# Patient Record
Sex: MALE | Race: WHITE | NOT HISPANIC OR LATINO | Employment: FULL TIME | URBAN - METROPOLITAN AREA
[De-identification: names, ages, dates, MRNs, and addresses within clinical notes are randomized per-mention and may not be internally consistent; named-entity substitution may affect disease eponyms.]

---

## 2018-09-24 ENCOUNTER — OFFICE VISIT (OUTPATIENT)
Dept: FAMILY MEDICINE CLINIC | Facility: CLINIC | Age: 53
End: 2018-09-24
Payer: COMMERCIAL

## 2018-09-24 VITALS
OXYGEN SATURATION: 96 % | DIASTOLIC BLOOD PRESSURE: 68 MMHG | HEART RATE: 79 BPM | BODY MASS INDEX: 30.96 KG/M2 | TEMPERATURE: 98.6 F | HEIGHT: 75 IN | SYSTOLIC BLOOD PRESSURE: 110 MMHG | WEIGHT: 249 LBS

## 2018-09-24 DIAGNOSIS — M79.661 RIGHT CALF PAIN: ICD-10-CM

## 2018-09-24 DIAGNOSIS — I10 HYPERTENSION, ESSENTIAL: ICD-10-CM

## 2018-09-24 DIAGNOSIS — K21.9 GASTROESOPHAGEAL REFLUX DISEASE WITHOUT ESOPHAGITIS: ICD-10-CM

## 2018-09-24 DIAGNOSIS — E11.42 TYPE 2 DIABETES MELLITUS WITH DIABETIC POLYNEUROPATHY, WITH LONG-TERM CURRENT USE OF INSULIN (HCC): Primary | ICD-10-CM

## 2018-09-24 DIAGNOSIS — Z79.4 TYPE 2 DIABETES MELLITUS WITH DIABETIC POLYNEUROPATHY, WITH LONG-TERM CURRENT USE OF INSULIN (HCC): Primary | ICD-10-CM

## 2018-09-24 DIAGNOSIS — G62.9 NEUROPATHY: ICD-10-CM

## 2018-09-24 PROBLEM — E11.9 TYPE 2 DIABETES MELLITUS WITHOUT COMPLICATION, WITHOUT LONG-TERM CURRENT USE OF INSULIN (HCC): Status: ACTIVE | Noted: 2018-09-24

## 2018-09-24 PROCEDURE — 3008F BODY MASS INDEX DOCD: CPT | Performed by: PHYSICIAN ASSISTANT

## 2018-09-24 PROCEDURE — 99204 OFFICE O/P NEW MOD 45 MIN: CPT | Performed by: PHYSICIAN ASSISTANT

## 2018-09-24 PROCEDURE — 3074F SYST BP LT 130 MM HG: CPT | Performed by: PHYSICIAN ASSISTANT

## 2018-09-24 PROCEDURE — 3078F DIAST BP <80 MM HG: CPT | Performed by: PHYSICIAN ASSISTANT

## 2018-09-24 RX ORDER — OMEPRAZOLE 40 MG/1
CAPSULE, DELAYED RELEASE ORAL
COMMUNITY
Start: 2018-07-10 | End: 2018-11-16

## 2018-09-24 RX ORDER — IRBESARTAN/HYDROCHLOROTHIAZIDE 300-12.5MG
TABLET ORAL
COMMUNITY
Start: 2018-09-04 | End: 2019-08-19 | Stop reason: SDUPTHER

## 2018-09-24 RX ORDER — VERAPAMIL HYDROCHLORIDE 360 MG/1
360 CAPSULE, DELAYED RELEASE PELLETS ORAL DAILY
COMMUNITY
Start: 2018-09-17 | End: 2020-10-19

## 2018-09-24 RX ORDER — CELECOXIB 200 MG/1
200 CAPSULE ORAL 2 TIMES DAILY
Qty: 60 CAPSULE | Refills: 1 | Status: SHIPPED | OUTPATIENT
Start: 2018-09-24 | End: 2018-11-16

## 2018-09-24 RX ORDER — PEN NEEDLE, DIABETIC 32GX 5/32"
NEEDLE, DISPOSABLE MISCELLANEOUS
COMMUNITY
Start: 2018-09-20 | End: 2019-01-24 | Stop reason: SDUPTHER

## 2018-09-25 ENCOUNTER — TELEPHONE (OUTPATIENT)
Dept: FAMILY MEDICINE CLINIC | Facility: CLINIC | Age: 53
End: 2018-09-25

## 2018-09-25 ENCOUNTER — HOSPITAL ENCOUNTER (OUTPATIENT)
Dept: ULTRASOUND IMAGING | Facility: CLINIC | Age: 53
Discharge: HOME | End: 2018-09-25
Payer: COMMERCIAL

## 2018-09-25 DIAGNOSIS — M79.661 RIGHT CALF PAIN: ICD-10-CM

## 2018-09-25 PROCEDURE — 93971 EXTREMITY STUDY: CPT

## 2018-09-25 NOTE — TELEPHONE ENCOUNTER
Pt's VAS lower limb venous duplex study, unilateral/limited was Negative for DVT  They will let pt go home

## 2018-09-27 PROCEDURE — 93971 EXTREMITY STUDY: CPT | Performed by: SURGERY

## 2018-10-29 ENCOUNTER — OFFICE VISIT (OUTPATIENT)
Dept: FAMILY MEDICINE CLINIC | Facility: CLINIC | Age: 53
End: 2018-10-29
Payer: COMMERCIAL

## 2018-10-29 VITALS
BODY MASS INDEX: 31.71 KG/M2 | OXYGEN SATURATION: 97 % | SYSTOLIC BLOOD PRESSURE: 130 MMHG | TEMPERATURE: 97.8 F | HEIGHT: 75 IN | WEIGHT: 255 LBS | DIASTOLIC BLOOD PRESSURE: 92 MMHG | HEART RATE: 55 BPM

## 2018-10-29 DIAGNOSIS — L03.031 PARONYCHIA OF GREAT TOE, RIGHT: Primary | ICD-10-CM

## 2018-10-29 DIAGNOSIS — R80.9 DIABETES MELLITUS WITH PROTEINURIA (HCC): ICD-10-CM

## 2018-10-29 DIAGNOSIS — G62.9 NEUROPATHY: ICD-10-CM

## 2018-10-29 DIAGNOSIS — H66.003 ACUTE SUPPURATIVE OTITIS MEDIA OF BOTH EARS WITHOUT SPONTANEOUS RUPTURE OF TYMPANIC MEMBRANES, RECURRENCE NOT SPECIFIED: ICD-10-CM

## 2018-10-29 DIAGNOSIS — E11.29 DIABETES MELLITUS WITH PROTEINURIA (HCC): ICD-10-CM

## 2018-10-29 PROCEDURE — 99212 OFFICE O/P EST SF 10 MIN: CPT | Performed by: PHYSICIAN ASSISTANT

## 2018-10-29 RX ORDER — ATORVASTATIN CALCIUM 20 MG/1
TABLET, FILM COATED ORAL
COMMUNITY
Start: 2018-06-19 | End: 2018-11-16

## 2018-10-29 RX ORDER — DICLOFENAC SODIUM 75 MG/1
75 TABLET, DELAYED RELEASE ORAL 2 TIMES DAILY WITH MEALS
Refills: 0 | COMMUNITY
Start: 2018-08-06 | End: 2018-11-16

## 2018-10-29 RX ORDER — CITALOPRAM 20 MG/1
TABLET ORAL
COMMUNITY
Start: 2018-01-24 | End: 2018-11-16

## 2018-10-29 RX ORDER — SILDENAFIL CITRATE 20 MG/1
TABLET ORAL
COMMUNITY
Start: 2018-01-24 | End: 2018-11-16

## 2018-10-29 RX ORDER — CHLORDIAZEPOXIDE HYDROCHLORIDE 10 MG/1
CAPSULE, GELATIN COATED ORAL
COMMUNITY
Start: 2018-01-24 | End: 2018-11-16

## 2018-10-29 RX ORDER — SULFAMETHOXAZOLE AND TRIMETHOPRIM 800; 160 MG/1; MG/1
1 TABLET ORAL EVERY 12 HOURS SCHEDULED
Qty: 20 TABLET | Refills: 0 | Status: SHIPPED | OUTPATIENT
Start: 2018-10-29 | End: 2018-11-06 | Stop reason: SDDI

## 2018-10-29 RX ORDER — DOXEPIN HYDROCHLORIDE 100 MG/1
CAPSULE ORAL
COMMUNITY
Start: 2018-01-24 | End: 2018-11-16

## 2018-10-29 NOTE — PROGRESS NOTES
Assessment/Plan:       Diagnoses and all orders for this visit:    Paronychia of great toe, right  -     sulfamethoxazole-trimethoprim (BACTRIM DS) 800-160 mg per tablet; Take 1 tablet by mouth every 12 (twelve) hours for 10 days    Neuropathy    Diabetes mellitus with proteinuria (HCC)    Acute suppurative otitis media of both ears without spontaneous rupture of tympanic membranes, recurrence not specified  -     sulfamethoxazole-trimethoprim (BACTRIM DS) 800-160 mg per tablet; Take 1 tablet by mouth every 12 (twelve) hours for 10 days    Other orders  -     atorvastatin (LIPITOR) 20 mg tablet; Take by mouth  -     diclofenac (VOLTAREN) 75 mg EC tablet; Take 75 mg by mouth 2 (two) times a day with meals  -     citalopram (CeleXA) 20 mg tablet; Take by mouth  -     chlordiazePOXIDE (LIBRIUM) 10 mg capsule; Take by mouth  -     doxepin (SINEquan) 100 mg capsule; Take by mouth  -     LYRICA 50 MG capsule;   -     sildenafil (REVATIO) 20 mg tablet; Take by mouth            Subjective:      Patient ID: Rupa Ortez is a 52 y o  male  Patient is here for follow-up of his right lower leg  He also notes that he has a sore on his right great toe  He has had some relief since starting Celebrex  He states that the neuropathy type pains that he was having are basically gone  He still has some slight discomfort but it is not what it used to be  He noticed last week that he had a sore on his right great toe  He has had diabetic and he became worried and made an appointment  He states that he is been cleaning and dressing at until his appointment  He has not gotten is blood work done yet he states he will try to get it done this week or next week  Leg Pain    The incident occurred more than 1 week ago  There was no injury mechanism  The pain is present in the right leg  The pain is at a severity of 4/10  The pain has been improving since onset  Associated symptoms include tingling   Pertinent negatives include no inability to bear weight, loss of motion, loss of sensation, muscle weakness or numbness  He reports no foreign bodies present  The symptoms are aggravated by weight bearing and palpation  He has tried NSAIDs for the symptoms  The treatment provided moderate relief  The following portions of the patient's history were reviewed and updated as appropriate:   He has no past medical history on file  ,   does not have any pertinent problems on file  ,   has a past surgical history that includes Foot surgery (Right)  ,  family history includes Cancer in his mother; Hypertension in his father  ,   reports that he has never smoked  He has never used smokeless tobacco  He reports that he drinks about 4 8 oz of alcohol per week   He reports that he does not use drugs  ,  has No Known Allergies     Current Outpatient Prescriptions   Medication Sig Dispense Refill    atorvastatin (LIPITOR) 20 mg tablet Take by mouth      AVALIDE 300-12 5 MG per tablet       BD PEN NEEDLE DEV U/F 32G X 4 MM MISC       Blood Glucose Monitoring Suppl SHAJI by Does not apply route      celecoxib (CeleBREX) 200 mg capsule Take 1 capsule (200 mg total) by mouth 2 (two) times a day 60 capsule 1    glucose blood test strip 1 each by Other route as needed Use as instructed      insulin glargine (LANTUS SOLOSTAR) 100 units/mL injection pen Inject 70 Units under the skin daily at bedtime 5 pen 0    insulin glulisine (APIDRA SOLOSTAR) 100 units/mL injection pen Inject 26 Units under the skin 3 (three) times a day with meals 5 pen 0    omeprazole (PriLOSEC) 40 MG capsule       verapamil (VERELAN PM) 360 MG 24 hr capsule       chlordiazePOXIDE (LIBRIUM) 10 mg capsule Take by mouth      citalopram (CeleXA) 20 mg tablet Take by mouth      diclofenac (VOLTAREN) 75 mg EC tablet Take 75 mg by mouth 2 (two) times a day with meals  0    doxepin (SINEquan) 100 mg capsule Take by mouth      LYRICA 50 MG capsule   0    sildenafil (REVATIO) 20 mg tablet Take by mouth      sulfamethoxazole-trimethoprim (BACTRIM DS) 800-160 mg per tablet Take 1 tablet by mouth every 12 (twelve) hours for 10 days 20 tablet 0     No current facility-administered medications for this visit  Review of Systems   Constitutional: Negative for activity change, chills and fever  Musculoskeletal: Positive for myalgias  Skin: Positive for wound  Neurological: Positive for tingling  Negative for dizziness and numbness  Objective:  Vitals:    10/29/18 1553   BP: 130/92   Pulse: 55   Temp: 97 8 °F (36 6 °C)   SpO2: 97%   Weight: 116 kg (255 lb)   Height: 6' 3" (1 905 m)     Body mass index is 31 87 kg/m²  Physical Exam   Constitutional: He is oriented to person, place, and time  He appears well-developed and well-nourished  Musculoskeletal: He exhibits tenderness  He exhibits no edema  Neurological: He is alert and oriented to person, place, and time  Skin:        Psychiatric: He has a normal mood and affect   His behavior is normal

## 2018-10-31 ENCOUNTER — TELEPHONE (OUTPATIENT)
Dept: FAMILY MEDICINE CLINIC | Facility: CLINIC | Age: 53
End: 2018-10-31

## 2018-11-01 ENCOUNTER — TELEPHONE (OUTPATIENT)
Dept: FAMILY MEDICINE CLINIC | Facility: CLINIC | Age: 53
End: 2018-11-01

## 2018-11-01 DIAGNOSIS — E11.29 DIABETES MELLITUS WITH PROTEINURIA (HCC): Primary | ICD-10-CM

## 2018-11-01 DIAGNOSIS — R80.9 DIABETES MELLITUS WITH PROTEINURIA (HCC): Primary | ICD-10-CM

## 2018-11-01 RX ORDER — BLOOD-GLUCOSE METER
EACH MISCELLANEOUS 2 TIMES DAILY
Qty: 1 EACH | Refills: 0 | Status: SHIPPED | OUTPATIENT
Start: 2018-11-01 | End: 2018-11-02 | Stop reason: SDUPTHER

## 2018-11-01 NOTE — TELEPHONE ENCOUNTER
Pt has missed several days of work d/t stomach bug  He would like to return to work     Adenike Haq for note?  [phone]

## 2018-11-02 DIAGNOSIS — E11.29 DIABETES MELLITUS WITH PROTEINURIA (HCC): ICD-10-CM

## 2018-11-02 DIAGNOSIS — R80.9 DIABETES MELLITUS WITH PROTEINURIA (HCC): ICD-10-CM

## 2018-11-02 RX ORDER — BLOOD-GLUCOSE METER
EACH MISCELLANEOUS 2 TIMES DAILY
Qty: 1 EACH | Refills: 0 | Status: SHIPPED | OUTPATIENT
Start: 2018-11-02

## 2018-11-02 NOTE — TELEPHONE ENCOUNTER
Pt's glucose meter was sent to mail order  Can we please send it to [facility]? He said he hasn't check his BS in a few weeks

## 2018-11-06 ENCOUNTER — OFFICE VISIT (OUTPATIENT)
Dept: FAMILY MEDICINE CLINIC | Facility: CLINIC | Age: 53
End: 2018-11-06
Payer: COMMERCIAL

## 2018-11-06 ENCOUNTER — APPOINTMENT (OUTPATIENT)
Dept: LAB | Facility: HOSPITAL | Age: 53
End: 2018-11-06
Payer: COMMERCIAL

## 2018-11-06 VITALS
TEMPERATURE: 98.3 F | RESPIRATION RATE: 16 BRPM | OXYGEN SATURATION: 97 % | HEIGHT: 75 IN | HEART RATE: 109 BPM | SYSTOLIC BLOOD PRESSURE: 108 MMHG | BODY MASS INDEX: 30.59 KG/M2 | DIASTOLIC BLOOD PRESSURE: 70 MMHG | WEIGHT: 246 LBS

## 2018-11-06 DIAGNOSIS — I10 HYPERTENSION, ESSENTIAL: ICD-10-CM

## 2018-11-06 DIAGNOSIS — Z79.4 TYPE 2 DIABETES MELLITUS WITH DIABETIC POLYNEUROPATHY, WITH LONG-TERM CURRENT USE OF INSULIN (HCC): ICD-10-CM

## 2018-11-06 DIAGNOSIS — E11.42 TYPE 2 DIABETES MELLITUS WITH DIABETIC POLYNEUROPATHY, WITH LONG-TERM CURRENT USE OF INSULIN (HCC): ICD-10-CM

## 2018-11-06 LAB
ALBUMIN SERPL BCP-MCNC: 3.8 G/DL (ref 3.5–5)
ALP SERPL-CCNC: 101 U/L (ref 46–116)
ALT SERPL W P-5'-P-CCNC: 104 U/L (ref 12–78)
ANION GAP SERPL CALCULATED.3IONS-SCNC: 10 MMOL/L (ref 4–13)
AST SERPL W P-5'-P-CCNC: 50 U/L (ref 5–45)
BILIRUB SERPL-MCNC: 0.5 MG/DL (ref 0.2–1)
BUN SERPL-MCNC: 27 MG/DL (ref 5–25)
CALCIUM SERPL-MCNC: 10 MG/DL (ref 8.3–10.1)
CHLORIDE SERPL-SCNC: 97 MMOL/L (ref 100–108)
CO2 SERPL-SCNC: 28 MMOL/L (ref 21–32)
CREAT SERPL-MCNC: 1.13 MG/DL (ref 0.6–1.3)
EST. AVERAGE GLUCOSE BLD GHB EST-MCNC: 280 MG/DL
GFR SERPL CREATININE-BSD FRML MDRD: 73 ML/MIN/1.73SQ M
GLUCOSE P FAST SERPL-MCNC: 369 MG/DL (ref 65–99)
HBA1C MFR BLD: 11.4 % (ref 4.2–6.3)
POTASSIUM SERPL-SCNC: 4.9 MMOL/L (ref 3.5–5.3)
PROT SERPL-MCNC: 8.1 G/DL (ref 6.4–8.2)
SODIUM SERPL-SCNC: 135 MMOL/L (ref 136–145)

## 2018-11-06 PROCEDURE — 99212 OFFICE O/P EST SF 10 MIN: CPT | Performed by: PHYSICIAN ASSISTANT

## 2018-11-06 PROCEDURE — 83036 HEMOGLOBIN GLYCOSYLATED A1C: CPT

## 2018-11-06 PROCEDURE — 36415 COLL VENOUS BLD VENIPUNCTURE: CPT

## 2018-11-06 PROCEDURE — 80053 COMPREHEN METABOLIC PANEL: CPT

## 2018-11-07 ENCOUNTER — TELEPHONE (OUTPATIENT)
Dept: FAMILY MEDICINE CLINIC | Facility: CLINIC | Age: 53
End: 2018-11-07

## 2018-11-07 NOTE — TELEPHONE ENCOUNTER
T/c to patient to clarify, he again said April 30th, should have been a Tuesday  I asked about the October date and he said "did I say April? I meant October"!   So yes, October 30, 2018

## 2018-11-07 NOTE — TELEPHONE ENCOUNTER
Pt was seen yesterday  Called today to inform that you will be receiving disability papers and his first day out was 4/30/18

## 2018-11-15 ENCOUNTER — HOSPITAL ENCOUNTER (EMERGENCY)
Facility: HOSPITAL | Age: 53
Discharge: HOME | End: 2018-11-15
Attending: EMERGENCY MEDICINE | Admitting: EMERGENCY MEDICINE
Payer: COMMERCIAL

## 2018-11-15 ENCOUNTER — OFFICE VISIT (OUTPATIENT)
Dept: FAMILY MEDICINE CLINIC | Facility: CLINIC | Age: 53
End: 2018-11-15

## 2018-11-15 ENCOUNTER — APPOINTMENT (EMERGENCY)
Dept: CT IMAGING | Facility: HOSPITAL | Age: 53
End: 2018-11-15
Payer: COMMERCIAL

## 2018-11-15 ENCOUNTER — APPOINTMENT (EMERGENCY)
Dept: RADIOLOGY | Facility: HOSPITAL | Age: 53
End: 2018-11-15
Payer: COMMERCIAL

## 2018-11-15 VITALS
RESPIRATION RATE: 16 BRPM | HEIGHT: 75 IN | TEMPERATURE: 97.8 F | HEART RATE: 76 BPM | WEIGHT: 248.68 LBS | SYSTOLIC BLOOD PRESSURE: 149 MMHG | DIASTOLIC BLOOD PRESSURE: 91 MMHG | BODY MASS INDEX: 30.92 KG/M2 | OXYGEN SATURATION: 98 %

## 2018-11-15 VITALS
OXYGEN SATURATION: 97 % | BODY MASS INDEX: 30.71 KG/M2 | DIASTOLIC BLOOD PRESSURE: 100 MMHG | HEART RATE: 92 BPM | WEIGHT: 247 LBS | SYSTOLIC BLOOD PRESSURE: 150 MMHG | HEIGHT: 75 IN | TEMPERATURE: 97.7 F

## 2018-11-15 DIAGNOSIS — E11.65 HYPERGLYCEMIA DUE TO TYPE 2 DIABETES MELLITUS (HCC): Primary | ICD-10-CM

## 2018-11-15 DIAGNOSIS — R11.2 NAUSEA AND VOMITING: ICD-10-CM

## 2018-11-15 DIAGNOSIS — E11.9 TYPE 2 DIABETES MELLITUS WITH HEMOGLOBIN A1C GOAL OF LESS THAN 7.0% (HCC): Primary | ICD-10-CM

## 2018-11-15 DIAGNOSIS — R42 VERTIGO: ICD-10-CM

## 2018-11-15 DIAGNOSIS — R42 DIZZINESS: ICD-10-CM

## 2018-11-15 LAB
ACETONE SERPL-MCNC: NEGATIVE MG/DL
ALBUMIN SERPL BCP-MCNC: 4.1 G/DL (ref 3.5–5)
ALP SERPL-CCNC: 99 U/L (ref 46–116)
ALT SERPL W P-5'-P-CCNC: 82 U/L (ref 12–78)
ANION GAP SERPL CALCULATED.3IONS-SCNC: 10 MMOL/L (ref 4–13)
APTT PPP: 27 SECONDS (ref 26–38)
AST SERPL W P-5'-P-CCNC: 48 U/L (ref 5–45)
BACTERIA UR QL AUTO: ABNORMAL /HPF
BASE EX.OXY STD BLDV CALC-SCNC: 39.7 % (ref 60–80)
BASE EXCESS BLDV CALC-SCNC: 4.4 MMOL/L
BASOPHILS # BLD AUTO: 0.06 THOUSANDS/ΜL (ref 0–0.1)
BASOPHILS NFR BLD AUTO: 1 % (ref 0–1)
BILIRUB DIRECT SERPL-MCNC: 0.18 MG/DL (ref 0–0.2)
BILIRUB SERPL-MCNC: 0.8 MG/DL (ref 0.2–1)
BILIRUB UR QL STRIP: NEGATIVE
BUN SERPL-MCNC: 12 MG/DL (ref 5–25)
CALCIUM SERPL-MCNC: 9.7 MG/DL (ref 8.3–10.1)
CHLORIDE SERPL-SCNC: 96 MMOL/L (ref 100–108)
CLARITY UR: CLEAR
CO2 SERPL-SCNC: 30 MMOL/L (ref 21–32)
COLOR UR: YELLOW
CREAT SERPL-MCNC: 0.78 MG/DL (ref 0.6–1.3)
EOSINOPHIL # BLD AUTO: 0.23 THOUSAND/ΜL (ref 0–0.61)
EOSINOPHIL NFR BLD AUTO: 3 % (ref 0–6)
ERYTHROCYTE [DISTWIDTH] IN BLOOD BY AUTOMATED COUNT: 11.8 % (ref 11.6–15.1)
GFR SERPL CREATININE-BSD FRML MDRD: 102 ML/MIN/1.73SQ M
GLUCOSE SERPL-MCNC: 201 MG/DL (ref 65–140)
GLUCOSE SERPL-MCNC: 225 MG/DL (ref 65–140)
GLUCOSE SERPL-MCNC: 226 MG/DL (ref 65–140)
GLUCOSE UR STRIP-MCNC: ABNORMAL MG/DL
HCO3 BLDV-SCNC: 30.3 MMOL/L (ref 24–30)
HCT VFR BLD AUTO: 47.1 % (ref 36.5–49.3)
HGB BLD-MCNC: 16.6 G/DL (ref 12–17)
HGB UR QL STRIP.AUTO: ABNORMAL
IMM GRANULOCYTES # BLD AUTO: 0.03 THOUSAND/UL (ref 0–0.2)
IMM GRANULOCYTES NFR BLD AUTO: 0 % (ref 0–2)
INR PPP: 0.99 (ref 0.86–1.17)
KETONES UR STRIP-MCNC: NEGATIVE MG/DL
LEUKOCYTE ESTERASE UR QL STRIP: NEGATIVE
LIPASE SERPL-CCNC: 103 U/L (ref 73–393)
LYMPHOCYTES # BLD AUTO: 2.81 THOUSANDS/ΜL (ref 0.6–4.47)
LYMPHOCYTES NFR BLD AUTO: 30 % (ref 14–44)
MAGNESIUM SERPL-MCNC: 1.9 MG/DL (ref 1.6–2.6)
MCH RBC QN AUTO: 31.2 PG (ref 26.8–34.3)
MCHC RBC AUTO-ENTMCNC: 35.2 G/DL (ref 31.4–37.4)
MCV RBC AUTO: 89 FL (ref 82–98)
MONOCYTES # BLD AUTO: 0.93 THOUSAND/ΜL (ref 0.17–1.22)
MONOCYTES NFR BLD AUTO: 10 % (ref 4–12)
NEUTROPHILS # BLD AUTO: 5.17 THOUSANDS/ΜL (ref 1.85–7.62)
NEUTS SEG NFR BLD AUTO: 56 % (ref 43–75)
NITRITE UR QL STRIP: NEGATIVE
NON-SQ EPI CELLS URNS QL MICRO: ABNORMAL /HPF
NRBC BLD AUTO-RTO: 0 /100 WBCS
O2 CT BLDV-SCNC: 9.5 ML/DL
PCO2 BLDV: 49.1 MM HG (ref 42–50)
PH BLDV: 7.41 [PH] (ref 7.3–7.4)
PH UR STRIP.AUTO: 6 [PH] (ref 4.5–8)
PLATELET # BLD AUTO: 261 THOUSANDS/UL (ref 149–390)
PMV BLD AUTO: 11.2 FL (ref 8.9–12.7)
PO2 BLDV: 22.3 MM HG (ref 35–45)
POTASSIUM SERPL-SCNC: 3.6 MMOL/L (ref 3.5–5.3)
PROT SERPL-MCNC: 8.4 G/DL (ref 6.4–8.2)
PROT UR STRIP-MCNC: NEGATIVE MG/DL
PROTHROMBIN TIME: 13 SECONDS (ref 11.8–14.2)
RBC # BLD AUTO: 5.32 MILLION/UL (ref 3.88–5.62)
RBC #/AREA URNS AUTO: ABNORMAL /HPF
SODIUM SERPL-SCNC: 136 MMOL/L (ref 136–145)
SP GR UR STRIP.AUTO: <=1.005 (ref 1–1.03)
TROPONIN I SERPL-MCNC: <0.02 NG/ML
UROBILINOGEN UR QL STRIP.AUTO: 0.2 E.U./DL
WBC # BLD AUTO: 9.23 THOUSAND/UL (ref 4.31–10.16)
WBC #/AREA URNS AUTO: ABNORMAL /HPF

## 2018-11-15 PROCEDURE — 96374 THER/PROPH/DIAG INJ IV PUSH: CPT

## 2018-11-15 PROCEDURE — 83690 ASSAY OF LIPASE: CPT | Performed by: EMERGENCY MEDICINE

## 2018-11-15 PROCEDURE — 84484 ASSAY OF TROPONIN QUANT: CPT | Performed by: EMERGENCY MEDICINE

## 2018-11-15 PROCEDURE — 82009 KETONE BODYS QUAL: CPT | Performed by: EMERGENCY MEDICINE

## 2018-11-15 PROCEDURE — 85730 THROMBOPLASTIN TIME PARTIAL: CPT | Performed by: EMERGENCY MEDICINE

## 2018-11-15 PROCEDURE — 70496 CT ANGIOGRAPHY HEAD: CPT

## 2018-11-15 PROCEDURE — 99284 EMERGENCY DEPT VISIT MOD MDM: CPT

## 2018-11-15 PROCEDURE — 96361 HYDRATE IV INFUSION ADD-ON: CPT

## 2018-11-15 PROCEDURE — 36415 COLL VENOUS BLD VENIPUNCTURE: CPT | Performed by: EMERGENCY MEDICINE

## 2018-11-15 PROCEDURE — 71046 X-RAY EXAM CHEST 2 VIEWS: CPT

## 2018-11-15 PROCEDURE — 83735 ASSAY OF MAGNESIUM: CPT | Performed by: EMERGENCY MEDICINE

## 2018-11-15 PROCEDURE — 70498 CT ANGIOGRAPHY NECK: CPT

## 2018-11-15 PROCEDURE — 80076 HEPATIC FUNCTION PANEL: CPT | Performed by: EMERGENCY MEDICINE

## 2018-11-15 PROCEDURE — RECHECK: Performed by: PHYSICIAN ASSISTANT

## 2018-11-15 PROCEDURE — 82948 REAGENT STRIP/BLOOD GLUCOSE: CPT

## 2018-11-15 PROCEDURE — 85610 PROTHROMBIN TIME: CPT | Performed by: EMERGENCY MEDICINE

## 2018-11-15 PROCEDURE — 81001 URINALYSIS AUTO W/SCOPE: CPT | Performed by: EMERGENCY MEDICINE

## 2018-11-15 PROCEDURE — 82805 BLOOD GASES W/O2 SATURATION: CPT | Performed by: EMERGENCY MEDICINE

## 2018-11-15 PROCEDURE — 93005 ELECTROCARDIOGRAM TRACING: CPT

## 2018-11-15 PROCEDURE — 85025 COMPLETE CBC W/AUTO DIFF WBC: CPT | Performed by: EMERGENCY MEDICINE

## 2018-11-15 PROCEDURE — 80048 BASIC METABOLIC PNL TOTAL CA: CPT | Performed by: EMERGENCY MEDICINE

## 2018-11-15 RX ORDER — MECLIZINE HYDROCHLORIDE 25 MG/1
50 TABLET ORAL ONCE
Status: DISCONTINUED | OUTPATIENT
Start: 2018-11-15 | End: 2018-11-15 | Stop reason: HOSPADM

## 2018-11-15 RX ORDER — ONDANSETRON 2 MG/ML
4 INJECTION INTRAMUSCULAR; INTRAVENOUS ONCE
Status: COMPLETED | OUTPATIENT
Start: 2018-11-15 | End: 2018-11-15

## 2018-11-15 RX ORDER — MECLIZINE HYDROCHLORIDE 25 MG/1
25 TABLET ORAL EVERY 8 HOURS PRN
Qty: 20 TABLET | Refills: 0 | Status: SHIPPED | OUTPATIENT
Start: 2018-11-15 | End: 2018-11-16

## 2018-11-15 RX ADMIN — SODIUM CHLORIDE 1000 ML: 0.9 INJECTION, SOLUTION INTRAVENOUS at 09:48

## 2018-11-15 RX ADMIN — ONDANSETRON HYDROCHLORIDE 4 MG: 2 SOLUTION INTRAMUSCULAR; INTRAVENOUS at 09:47

## 2018-11-15 RX ADMIN — IOHEXOL 85 ML: 350 INJECTION, SOLUTION INTRAVENOUS at 10:36

## 2018-11-15 NOTE — DISCHARGE INSTRUCTIONS
Vertigo   WHAT YOU NEED TO KNOW:   Vertigo is a condition that causes you to feel dizzy  You may feel that you or everything around you is moving or spinning  You may also feel like you are being pulled down or toward your side  DISCHARGE INSTRUCTIONS:   Seek care immediately if:   · You have a headache and a stiff neck  · You have shaking chills and a fever  · You vomit over and over with no relief  · You have blood, pus, or fluid coming out of your ears  · You are confused  Contact your healthcare provider if:   · Your symptoms do not get better with treatment  · You have questions about your condition or care  Medicines:   · Medicine  may be given to help relieve your symptoms  · Take your medicine as directed  Contact your healthcare provider if you think your medicine is not helping or if you have side effects  Tell him or her if you are allergic to any medicine  Keep a list of the medicines, vitamins, and herbs you take  Include the amounts, and when and why you take them  Bring the list or the pill bottles to follow-up visits  Carry your medicine list with you in case of an emergency  Manage your symptoms:   · Do not drive , walk without help, or operate heavy machinery when you are dizzy  · Move slowly  when you move from one position to another position  Get up slowly from sitting or lying down  Sit or lie down right away if you feel dizzy  · Drink plenty of liquids  Liquids help prevent dehydration  Ask how much liquid to drink each day and which liquids are best for you  · Vestibular and balance rehabilitation therapy (VBRT)  is used to teach you exercises to improve your balance and strength  These exercises may help decrease your vertigo and improve your balance  Ask for more information about this therapy  Follow up with your healthcare provider as directed:  Write down your questions so you remember to ask them during your visits     © 2017 [employer] [facility] is for End User's use only and may not be sold, redistributed or otherwise used for commercial purposes  All illustrations and images included in CareNotes® are the copyrighted property of [employer]  or Richy Murillo  The above information is an  only  It is not intended as medical advice for individual conditions or treatments  Talk to your doctor, nurse or pharmacist before following any medical regimen to see if it is safe and effective for you  Diabetic Hyperglycemia   WHAT YOU NEED TO KNOW:   Diabetic hyperglycemia is a blood glucose (sugar) level that is higher than your healthcare provider recommends  You may have increased thirst and urinate more often than usual  Over time, uncontrolled diabetes can damage your nerves, blood vessels, tissues, and organs  That is why it is important to manage diabetic hyperglycemia  Without treatment, diabetic hyperglycemia can lead to diabetic ketoacidosis (DKA) or hyperglycemic hyperosmolar state (HHS)  These are serious conditions that can become life-threatening  DISCHARGE INSTRUCTIONS:   Seek care immediately if:   · You have shortness of breath  · Your breath smells fruity  · You have nausea and vomiting  · You have symptoms of dehydration, such as dark yellow urine, dry mouth and lips, and dry skin  Contact your healthcare provider if:   · You continue to have higher blood sugar levels than your healthcare provider recommends  · Your blood sugar level is over 240 mg/dl and  you have ketones in your urine  · You have questions or concerns about your condition or care  Medicines:   · Medicines  such as insulin and hypoglycemic medicine decrease blood sugar levels  · Take your medicine as directed  Contact your healthcare provider if you think your medicine is not helping or if you have side effects  Tell him or her if you are allergic to any medicine   Keep a list of the medicines, vitamins, and herbs you take  Include the amounts, and when and why you take them  Bring the list or the pill bottles to follow-up visits  Carry your medicine list with you in case of an emergency  Follow up with your healthcare provider or specialist as directed: Your healthcare provider may refer you to a dietitian or diabetes specialist  Write down your questions so you remember to ask them during your visits  Manage diabetic hyperglycemia:   · If you take diabetes medicine or insulin, take it as directed  Missed or wrong doses can cause your blood sugar to go up  · Tell your healthcare provider if you continue to have trouble managing your blood sugar  He may change the type, amount, or timing of your diabetes medicine or insulin  If you do not take diabetes medicine or insulin, you may need to start  · Work with your healthcare provider to develop a sick day plan  Illness can cause your blood sugar to rise  A sick day plan helps you control your blood sugar level when you are sick  Prevent diabetic hyperglycemia:   · Check your blood sugar levels regularly  Ask your healthcare provider how often to check your blood sugar and what your levels should be  · Follow your meal plan  Your blood sugar can go up if you eat a large meal or you eat more carbohydrates than recommended  Work with a dietitian to develop a meal plan that is right for you  · Exercise regularly  to help lower your blood sugar when it is high  It can also keep your blood sugar levels steady over time  Exercise for at least 30 minutes, 5 days a week  Include muscle strengthening activities 2 days each week  Do not sit for longer than 90 minutes at a time  Work with your healthcare provider to create an exercise plan  Children should get at least 60 minutes of physical activity each day  · Check your ketones before exercise  if your blood sugar level is above 240 mg/dl   Do not exercise if you have ketones in your urine, because your blood sugar level may rise even more  Ask your healthcare provider how to lower your blood sugar when you have ketones  © 2017 [address] [facility] Information is for End User's use only and may not be sold, redistributed or otherwise used for commercial purposes  All illustrations and images included in CareNotes® are the copyrighted property of [employer]  or Richy Murillo  The above information is an  only  It is not intended as medical advice for individual conditions or treatments  Talk to your doctor, nurse or pharmacist before following any medical regimen to see if it is safe and effective for you

## 2018-11-15 NOTE — ED PROVIDER NOTES
History  Chief Complaint   Patient presents with    Vomiting     Pt states he's recently been on disability from complications from his diabetes  Went to his PCP for N/V, and dizziness, and was told to come here to rule out DKA     Patient is a 57-year-old male with past medical history of type 2 insulin-dependent diabetes, hypertension and hyperlipidemia, presents to the emergency department at the request of his family doctor for elevated glucose as well as recent nausea, vomiting and dizziness  Patient reports for the past 2 weeks he has been intermittently nauseous  For the 1st week he was having several episodes of intermittent nonbilious and nonbloody vomiting  Last night, he began vomiting again  He states when he does get very nauseous he also feels very dizzy and feels as though the entire room is spinning  He states last night he got to the point where he could not even get up because the dizziness was so bad  He denies ever having this type of dizziness before  He does report 2 weeks ago being seen by his PCP for right-sided tinnitus and was started on an antibiotic which she completed however the tinnitus is still present  He also reports over the past couple of weeks his glucose has been much higher than normal ranging in the 500-600 range however this morning it was in the 200s  He reports yesterday having a mild frontal headache but denies headache now  He also reports intermittent bilateral blurry vision  He denies any known fever, shaking chills, diaphoresis, eye pain, photophobia, loss of hearing or ear pain, URI symptoms, cough, chest pain, palpitations, dyspnea, abdominal pain, diarrhea, constipation, blood per rectum or melena, dysuria, change in urinary frequency, hematuria, flank pain, skin rash or color change, extremity swelling or pain, extremity weakness or paresthesia, slurring of speech, facial asymmetry or other focal neurologic deficits    Denies prior history of stroke or prior diagnosis of vertigo  Denies any recent fall or head injury  He denies being on any blood thinners  History provided by:  Patient   used: No    Vomiting   Associated symptoms: no abdominal pain, no chills, no cough, no diarrhea, no fever, no headaches and no sore throat        Prior to Admission Medications   Prescriptions Last Dose Informant Patient Reported? Taking?    AVALIDE 300-12 5 MG per tablet  Self Yes No   BD PEN NEEDLE DEV U/F 32G X 4 MM MISC  Self Yes No   Blood Glucose Monitoring Suppl (ONE TOUCH ULTRA 2) w/Device KIT  Self No No   Sig: by Does not apply route 2 (two) times a day   LYRICA 50 MG capsule  Self Yes No   atorvastatin (LIPITOR) 20 mg tablet  Self Yes No   Sig: Take by mouth   celecoxib (CeleBREX) 200 mg capsule  Self No No   Sig: Take 1 capsule (200 mg total) by mouth 2 (two) times a day   chlordiazePOXIDE (LIBRIUM) 10 mg capsule  Self Yes No   Sig: Take by mouth   citalopram (CeleXA) 20 mg tablet  Self Yes No   Sig: Take by mouth   diclofenac (VOLTAREN) 75 mg EC tablet  Self Yes No   Sig: Take 75 mg by mouth 2 (two) times a day with meals   doxepin (SINEquan) 100 mg capsule  Self Yes No   Sig: Take by mouth   glucose blood test strip  Self Yes No   Si each by Other route as needed Use as instructed   insulin glargine (LANTUS SOLOSTAR) 100 units/mL injection pen   No No   Sig: Inject 80 Units under the skin daily at bedtime   insulin glulisine (APIDRA SOLOSTAR) 100 units/mL injection pen   No No   Sig: Inject 30 Units under the skin 3 (three) times a day with meals   omeprazole (PriLOSEC) 40 MG capsule  Self Yes No   sildenafil (REVATIO) 20 mg tablet  Self Yes No   Sig: Take by mouth   verapamil (VERELAN PM) 360 MG 24 hr capsule  Self Yes No      Facility-Administered Medications: None       Past Medical History:   Diagnosis Date    Diabetes mellitus (HCC)     Hyperlipidemia     Hypertension        Past Surgical History:   Procedure Laterality Date  FOOT SURGERY Right        Family History   Problem Relation Age of Onset    Cancer Mother     Hypertension Father      I have reviewed and agree with the history as documented  Social History   Substance Use Topics    Smoking status: Never Smoker    Smokeless tobacco: Never Used    Alcohol use 4 8 oz/week     8 Cans of beer per week        Review of Systems   Constitutional: Negative for chills, diaphoresis and fever  HENT: Positive for tinnitus  Negative for congestion, ear discharge, ear pain, hearing loss, rhinorrhea and sore throat  Eyes: Positive for visual disturbance  Negative for photophobia and pain  Respiratory: Negative for cough, chest tightness, shortness of breath and wheezing  Cardiovascular: Negative for chest pain, palpitations and leg swelling  Gastrointestinal: Positive for nausea and vomiting  Negative for abdominal distention, abdominal pain, blood in stool, constipation and diarrhea  Genitourinary: Negative for dysuria, flank pain, frequency and hematuria  Musculoskeletal: Negative for back pain, neck pain and neck stiffness  Skin: Negative for color change, pallor, rash and wound  Allergic/Immunologic: Negative for immunocompromised state  Neurological: Positive for dizziness  Negative for seizures, syncope, facial asymmetry, speech difficulty, weakness, light-headedness, numbness and headaches  Hematological: Negative for adenopathy  Does not bruise/bleed easily  Psychiatric/Behavioral: Negative for confusion and decreased concentration  All other systems reviewed and are negative  Physical Exam  Physical Exam   Constitutional: He is oriented to person, place, and time  He appears well-developed and well-nourished  No distress  HENT:   Head: Normocephalic and atraumatic  Right Ear: External ear normal    Left Ear: External ear normal    Mouth/Throat: Oropharynx is clear and moist  No oropharyngeal exudate     Bilateral TMs clear with good light reflex  Eyes: Pupils are equal, round, and reactive to light  Conjunctivae and EOM are normal    No nystagmus  Neck: Normal range of motion  Neck supple  No JVD present  Cardiovascular: Normal rate, regular rhythm, normal heart sounds and intact distal pulses  Exam reveals no gallop and no friction rub  No murmur heard  Pulmonary/Chest: Effort normal and breath sounds normal  No respiratory distress  He has no wheezes  He has no rales  Abdominal: Soft  Bowel sounds are normal  He exhibits no distension  There is no tenderness  There is no rebound and no guarding  Musculoskeletal: Normal range of motion  He exhibits no edema or tenderness  Lymphadenopathy:     He has no cervical adenopathy  Neurological: He is alert and oriented to person, place, and time  No cranial nerve deficit  No gross motor or sensory deficits  5/5 strength throughout  Normal finger-to-nose exam   Normal speech  Skin: Skin is warm and dry  No rash noted  He is not diaphoretic  No pallor  Psychiatric: He has a normal mood and affect  His behavior is normal    Nursing note and vitals reviewed        Vital Signs  ED Triage Vitals [11/15/18 0923]   Temperature Pulse Respirations Blood Pressure SpO2   97 8 °F (36 6 °C) 91 19 (!) 180/110 99 %      Temp Source Heart Rate Source Patient Position - Orthostatic VS BP Location FiO2 (%)   Oral Monitor Sitting Right arm --      Pain Score       No Pain         Vitals:    11/15/18 0923 11/15/18 1000 11/15/18 1215   BP: (!) 180/110 165/94 149/91   BP Location: Right arm Left arm Right arm   Pulse: 91 79 76   Resp: 19 18 16   Temp: 97 8 °F (36 6 °C)     TempSrc: Oral     SpO2: 99% 97% 98%   Weight: 113 kg (248 lb 10 9 oz)     Height: 6' 3" (1 905 m)       Visual Acuity      ED Medications  Medications   meclizine (ANTIVERT) tablet 50 mg (50 mg Oral Not Given 11/15/18 0949)   sodium chloride 0 9 % bolus 1,000 mL (0 mL Intravenous Stopped 11/15/18 1048)   ondansetron (ZOFRAN) injection 4 mg (4 mg Intravenous Given 11/15/18 0947)   iohexol (OMNIPAQUE) 350 MG/ML injection (MULTI-DOSE) 85 mL (85 mL Intravenous Given 11/15/18 1036)       Diagnostic Studies  Results Reviewed     Procedure Component Value Units Date/Time    Fingerstick Glucose (POCT) [[id number]]  (Abnormal) Collected:  11/15/18 1232    Lab Status:  Final result Updated:  11/15/18 1233     POC Glucose 201 (H) mg/dl     Urine Microscopic [[id number]]  (Abnormal) Collected:  11/15/18 1207    Lab Status:  Final result Specimen:  Urine from Urine, Clean Catch Updated:  11/15/18 1225     RBC, UA 1-2 (A) /hpf      WBC, UA None Seen /hpf      Epithelial Cells Occasional /hpf      Bacteria, UA None Seen /hpf     UA w Reflex to Microscopic [[id number]]  (Abnormal) Collected:  11/15/18 1207    Lab Status:  Final result Specimen:  Urine from Urine, Clean Catch Updated:  11/15/18 1220     Color, UA Yellow     Clarity, UA Clear     Specific Gravity, UA <=1 005     pH, UA 6 0     Leukocytes, UA Negative     Nitrite, UA Negative     Protein, UA Negative mg/dl      Glucose,  (1/2%) (A) mg/dl      Ketones, UA Negative mg/dl      Urobilinogen, UA 0 2 E U /dl      Bilirubin, UA Negative     Blood, UA Small (A)    Basic metabolic panel [[id number]]  (Abnormal) Collected:  11/15/18 0942    Lab Status:  Final result Specimen:  Blood from Arm, Right Updated:  11/15/18 1023     Sodium 136 mmol/L      Potassium 3 6 mmol/L      Chloride 96 (L) mmol/L      CO2 30 mmol/L      ANION GAP 10 mmol/L      BUN 12 mg/dL      Creatinine 0 78 mg/dL      Glucose 225 (H) mg/dL      Calcium 9 7 mg/dL      eGFR 102 ml/min/1 73sq m     Narrative:         National Kidney Disease Education Program recommendations are as follows:  GFR calculation is accurate only with a steady state creatinine  Chronic Kidney disease less than 60 ml/min/1 73 sq  meters  Kidney failure less than 15 ml/min/1 73 sq  meters      Magnesium [[id number]]  (Normal) Collected:  11/15/18 0942    Lab Status: Final result Specimen:  Blood from Arm, Right Updated:  11/15/18 1023     Magnesium 1 9 mg/dL     Hepatic function panel [[id number]]  (Abnormal) Collected:  11/15/18 0942    Lab Status:  Final result Specimen:  Blood from Arm, Right Updated:  11/15/18 1023     Total Bilirubin 0 80 mg/dL      Bilirubin, Direct 0 18 mg/dL      Alkaline Phosphatase 99 U/L      AST 48 (H) U/L      ALT 82 (H) U/L      Total Protein 8 4 (H) g/dL      Albumin 4 1 g/dL     Lipase [[id number]]  (Normal) Collected:  11/15/18 0942    Lab Status:  Final result Specimen:  Blood from Arm, Right Updated:  11/15/18 1023     Lipase 103 u/L     Troponin I [[id number]]  (Normal) Collected:  11/15/18 0942    Lab Status:  Final result Specimen:  Blood from Arm, Right Updated:  11/15/18 1013     Troponin I <0 02 ng/mL     Acetone [[id number]]  (Normal) Collected:  11/15/18 0942    Lab Status:  Final result Specimen:  Blood from Arm, Right Updated:  11/15/18 1007     Acetone, Bld Negative    Protime-INR [[id number]]  (Normal) Collected:  11/15/18 0943    Lab Status:  Final result Specimen:  Blood from Arm, Right Updated:  11/15/18 1001     Protime 13 0 seconds      INR 0 99    APTT [[id number]]  (Normal) Collected:  11/15/18 0943    Lab Status:  Final result Specimen:  Blood from Arm, Right Updated:  11/15/18 1001     PTT 27 seconds     CBC and differential [[id number]] Collected:  11/15/18 0942    Lab Status:  Final result Specimen:  Blood from Arm, Right Updated:  11/15/18 0950     WBC 9 23 Thousand/uL      RBC 5 32 Million/uL      Hemoglobin 16 6 g/dL      Hematocrit 47 1 %      MCV 89 fL      MCH 31 2 pg      MCHC 35 2 g/dL      RDW 11 8 %      MPV 11 2 fL      Platelets 481 Thousands/uL      nRBC 0 /100 WBCs      Neutrophils Relative 56 %      Immat GRANS % 0 %      Lymphocytes Relative 30 %      Monocytes Relative 10 %      Eosinophils Relative 3 %      Basophils Relative 1 %      Neutrophils Absolute 5 17 Thousands/µL      Immature Grans Absolute 0 03 Thousand/uL      Lymphocytes Absolute 2 81 Thousands/µL      Monocytes Absolute 0 93 Thousand/µL      Eosinophils Absolute 0 23 Thousand/µL      Basophils Absolute 0 06 Thousands/µL     Blood gas, venous [[id number]]  (Abnormal) Collected:  11/15/18 0942    Lab Status:  Final result Specimen:  Blood from Arm, Right Updated:  11/15/18 0949     pH, Darci 7 408 (H)     pCO2, Darci 49 1 mm Hg      pO2, Darci 22 3 (L) mm Hg      HCO3, Darci 30 3 (H) mmol/L      Base Excess, Darci 4 4 mmol/L      O2 Content, Darci 9 5 ml/dL      O2 HGB, VENOUS 39 7 (L) %     Fingerstick Glucose (POCT) [[id number]]  (Abnormal) Collected:  11/15/18 0939    Lab Status:  Final result Updated:  11/15/18 0940     POC Glucose 226 (H) mg/dl                  CTA head and neck with and without contrast   Final Result by Cheko Liu MD (11/15 1143)      No acute intracranial disease  Somewhat precocious vascular calcifications  No large vessel flow restrictive disease within the head or neck  Workstation performed: [id number]         XR chest 2 views   Final Result by Nan Mccloud MD (11/15 1038)      No radiographic evidence of acute intrathoracic process              Workstation performed: [id number]                    Procedures  ECG 12 Lead Documentation  Date/Time: 11/15/2018 9:47 AM  Performed by: Nehemiah Nino by: Lion Sensor     ECG reviewed by me, the ED Provider: yes    Patient location:  ED  Previous ECG:     Previous ECG:  Unavailable  Rate:     ECG rate:  90    ECG rate assessment: normal    Rhythm:     Rhythm: sinus rhythm    Ectopy:     Ectopy: PVCs      PVCs:  Infrequent  QRS:     QRS axis:  Normal    QRS intervals:  Normal  Conduction:     Conduction: normal    ST segments:     ST segments:  Normal  Other findings:     Other findings: prolonged qTc interval             Phone Contacts  ED Phone Contact    ED Course  ED Course as of Nov 15 1247   Thu Nov 15, 2018   4412 POC Glucose: (!) 226   7837 HCO3, Darci: (!) 30 3   0950 No evidence of DKA based on VBG  pH, Darci: (!) 7 408   0950 Hemoglobin: 16 6   0950 WBC: 9 23   1011 Acetone, Bld: Negative   1027 AST: (!) 48   1027 Mild non-significant transaminitis  ALT: (!) 82   1150 Patient reassessed and states he is feeling well  He is no longer nauseous or dizzy at this time  He has been ambulatory since being in the ED without any evidence of ataxia  1211 Updated of normal CTA  Patient to be discharged from ED  Instructed him to f/u with PCP as soon as possible and to return if his symptoms worsen or he develops new/worrisome symptoms which I discussed with patient  Will give referral for ENT and send home with scripts for meclizine/zofran  MDM  Number of Diagnoses or Management Options  Diagnosis management comments: 49-year-old male with history of type 2 insulin-dependent diabetes and hypertension, presents with intermittent nausea, vomiting and vertigo associated with right-sided tinnitus for the past 1-2 weeks  Patient sent in by PCP for further evaluation  Differential is vast and includes benign positional paroxysmal vertigo, Meniere's disease, labyrinthitis, stroke however less likely, electrolyte or metabolic abnormality such as DKA  Will evaluate with cardiac and abdominal labs, acetone, VBG, EKG, CTA of the head and neck  Will give IV fluids, Zofran and meclizine         Amount and/or Complexity of Data Reviewed  Clinical lab tests: ordered and reviewed  Tests in the radiology section of CPT®: ordered and reviewed  Tests in the medicine section of CPT®: ordered and reviewed  Independent visualization of images, tracings, or specimens: yes      CritCare Time    Disposition  Final diagnoses:   Hyperglycemia due to type 2 diabetes mellitus (HCC)   Nausea and vomiting   Dizziness   Vertigo     Time reflects when diagnosis was documented in both MDM as applicable and the Disposition within this note     Time User Action Codes Description Comment    11/15/2018 12:25 PM Florian November E Add [E11 65] Hyperglycemia due to type 2 diabetes mellitus ([facility] [address] )     11/15/2018 12:25 PM Florian November E Add [R11 2] Nausea and vomiting     11/15/2018 12:25 PM Florian November E Add [R42] Dizziness     11/15/2018 12:25 PM Florian November E Add [R42] Vertigo       ED Disposition     ED Disposition Condition Comment    Discharge  Carolina Flash discharge to home/self care  Condition at discharge: Stable        Follow-up Information     Follow up With Specialties Details Why Contact Info Additional Information    Padmini Domingo PA-C Family Medicine, Physician Assistant Schedule an appointment as soon as possible for a visit  [address]  [address]  [address] [place] [address] 1506       [address] [place] [address] Emergency Department Emergency Medicine Go to If symptoms worsen [facility]  [phone] [place] ED, [address] [place], [place], [address] [facility] Ent Otolaryngology Schedule an appointment as soon as possible for a visit  [address]  [place] [address]  Merrick Balderrama   [phone]             Patient's Medications   Discharge Prescriptions    MECLIZINE (ANTIVERT) 25 MG TABLET    Take 1 tablet (25 mg total) by mouth every 8 (eight) hours as needed for dizziness or nausea       Start Date: 11/15/2018End Date: --       Order Dose: 25 mg       Quantity: 20 tablet    Refills: 0     No discharge procedures on file      ED Provider  Electronically Signed by           Radha Alexis DO  11/15/18 9410

## 2018-11-15 NOTE — PROGRESS NOTES
Assessment/Plan:       Diagnoses and all orders for this visit:    Type 2 diabetes mellitus with hemoglobin A1c goal of less than 7 0% [facility])        Patient is sent to the emergency room    Subjective:      Patient ID: Klever Schaefer is a 52 y o  male  Patient is here today for follow-up  He is still having vomiting and fatigue  He did not go to the emergency room as suggested if this continued  Am now sending him to the emergency room  The following portions of the patient's history were reviewed and updated as appropriate:   He has no past medical history on file  ,   does not have any pertinent problems on file  ,   has a past surgical history that includes Foot surgery (Right)  ,  family history includes Cancer in his mother; Hypertension in his father  ,   reports that he has never smoked  He has never used smokeless tobacco  He reports that he drinks about 4 8 oz of alcohol per week   He reports that he does not use drugs  ,  has No Known Allergies     Current Outpatient Prescriptions   Medication Sig Dispense Refill    atorvastatin (LIPITOR) 20 mg tablet Take by mouth      AVALIDE 300-12 5 MG per tablet       BD PEN NEEDLE DEV U/F 32G X 4 MM MISC       Blood Glucose Monitoring Suppl (ONE TOUCH ULTRA 2) w/Device KIT by Does not apply route 2 (two) times a day 1 each 0    celecoxib (CeleBREX) 200 mg capsule Take 1 capsule (200 mg total) by mouth 2 (two) times a day 60 capsule 1    chlordiazePOXIDE (LIBRIUM) 10 mg capsule Take by mouth      citalopram (CeleXA) 20 mg tablet Take by mouth      diclofenac (VOLTAREN) 75 mg EC tablet Take 75 mg by mouth 2 (two) times a day with meals  0    doxepin (SINEquan) 100 mg capsule Take by mouth      glucose blood test strip 1 each by Other route as needed Use as instructed      insulin glargine (LANTUS SOLOSTAR) 100 units/mL injection pen Inject 80 Units under the skin daily at bedtime 5 pen 0    insulin glulisine (APIDRA SOLOSTAR) 100 units/mL injection pen Inject 30 Units under the skin 3 (three) times a day with meals 5 pen 0    LYRICA 50 MG capsule   0    omeprazole (PriLOSEC) 40 MG capsule       sildenafil (REVATIO) 20 mg tablet Take by mouth      verapamil (VERELAN PM) 360 MG 24 hr capsule        No current facility-administered medications for this visit  Review of Systems   Constitutional: Positive for activity change, chills and fatigue  HENT: Positive for tinnitus  Eyes: Positive for visual disturbance  Gastrointestinal: Positive for abdominal pain, nausea and vomiting  Neurological: Positive for dizziness, weakness and light-headedness  Objective:  Vitals:    11/15/18 0859   BP: 150/100   Pulse: 92   Temp: 97 7 °F (36 5 °C)   SpO2: 97%   Weight: 112 kg (247 lb)   Height: 6' 3" (1 905 m)     Body mass index is 30 87 kg/m²  Physical Exam   Constitutional: He is oriented to person, place, and time  He appears distressed  Neurological: He is alert and oriented to person, place, and time  Nursing note and vitals reviewed

## 2018-11-15 NOTE — ED NOTES
Patient transported to [facility], [address]  39/62/77 1037

## 2018-11-16 ENCOUNTER — TELEPHONE (OUTPATIENT)
Dept: OTHER | Facility: OTHER | Age: 53
End: 2018-11-16

## 2018-11-16 ENCOUNTER — OFFICE VISIT (OUTPATIENT)
Dept: ENDOCRINOLOGY | Facility: CLINIC | Age: 53
End: 2018-11-16
Payer: COMMERCIAL

## 2018-11-16 VITALS
SYSTOLIC BLOOD PRESSURE: 140 MMHG | WEIGHT: 254.6 LBS | DIASTOLIC BLOOD PRESSURE: 100 MMHG | HEART RATE: 107 BPM | HEIGHT: 75 IN | BODY MASS INDEX: 31.65 KG/M2

## 2018-11-16 DIAGNOSIS — I10 HYPERTENSION, ESSENTIAL: ICD-10-CM

## 2018-11-16 DIAGNOSIS — Z79.4 TYPE 2 DIABETES MELLITUS WITH OTHER OPHTHALMIC COMPLICATION, WITH LONG-TERM CURRENT USE OF INSULIN (HCC): ICD-10-CM

## 2018-11-16 DIAGNOSIS — E66.9 OBESITY, CLASS I, BMI 30.0-34.9 (SEE ACTUAL BMI): ICD-10-CM

## 2018-11-16 DIAGNOSIS — Z79.4 TYPE 2 DIABETES MELLITUS WITH DIABETIC POLYNEUROPATHY, WITH LONG-TERM CURRENT USE OF INSULIN (HCC): ICD-10-CM

## 2018-11-16 DIAGNOSIS — Z79.4 TYPE 2 DIABETES MELLITUS WITH HYPERGLYCEMIA, WITH LONG-TERM CURRENT USE OF INSULIN (HCC): Primary | ICD-10-CM

## 2018-11-16 DIAGNOSIS — E11.39 TYPE 2 DIABETES MELLITUS WITH OTHER OPHTHALMIC COMPLICATION, WITH LONG-TERM CURRENT USE OF INSULIN (HCC): ICD-10-CM

## 2018-11-16 DIAGNOSIS — E11.65 TYPE 2 DIABETES MELLITUS WITH HYPERGLYCEMIA, WITH LONG-TERM CURRENT USE OF INSULIN (HCC): Primary | ICD-10-CM

## 2018-11-16 DIAGNOSIS — E78.5 DYSLIPIDEMIA, GOAL LDL BELOW 100: ICD-10-CM

## 2018-11-16 DIAGNOSIS — E11.42 TYPE 2 DIABETES MELLITUS WITH DIABETIC POLYNEUROPATHY, WITH LONG-TERM CURRENT USE OF INSULIN (HCC): ICD-10-CM

## 2018-11-16 PROBLEM — E11.9 DIABETES MELLITUS (HCC): Status: ACTIVE | Noted: 2018-11-16

## 2018-11-16 LAB
ATRIAL RATE: 90 BPM
P AXIS: 56 DEGREES
PR INTERVAL: 162 MS
QRS AXIS: 19 DEGREES
QRSD INTERVAL: 100 MS
QT INTERVAL: 402 MS
QTC INTERVAL: 491 MS
T WAVE AXIS: 14 DEGREES
VENTRICULAR RATE: 90 BPM

## 2018-11-16 PROCEDURE — 99245 OFF/OP CONSLTJ NEW/EST HI 55: CPT | Performed by: INTERNAL MEDICINE

## 2018-11-16 PROCEDURE — 93010 ELECTROCARDIOGRAM REPORT: CPT | Performed by: INTERNAL MEDICINE

## 2018-11-16 NOTE — ASSESSMENT & PLAN NOTE
Lab Results   Component Value Date    HGBA1C 11 4 (H) 11/06/2018       Recent Labs      11/15/18   0939  11/15/18   1232   POCGLU  226*  201*     Uncontrolled type 2 diabetes complicated by retinopathy and neuropathy  Patient counseled on complications of uncontrolled type 2 diabetes-counseled on dietary and lifestyle modifications and weight loss-counseled on the importance of taking medications as directed  For now I am going to discontinue Lantus and start him on tresiba 90 units daily-continue Apidra 30 units before meals-advised to check blood sugars before eating intake insulin as directed  He will monitor blood sugars 3 to 4 times a day and send over log in 2 weeks  Also given information on personal continuous glucose monitor-he will check with his insurance to see if these devices at covered  Sent over fingerstick log in 2 weeks or sooner if he starts having any hypoglycemic episodes    Being on intensive insulin therapy he is at risk for severe hypoglycemia  He should be seeing a nutritionist however he states that he would be able to come to our office secondary to the distance-I have advised him to Dr  His primary care to see if he can see a nutritionist close to home    Blood Sugar Average: Last 72 hrs:

## 2018-11-16 NOTE — ASSESSMENT & PLAN NOTE
Blood pressure above goal-continue current regimen-reassess next visit    Will also check urine microalbumin to creatinine ratio

## 2018-11-16 NOTE — TELEPHONE ENCOUNTER
Told pt that we faxed his disability papers yesterday and he wanted to let you know that he went to the ER and all they did was give him fluids , but didn't find anything wrong

## 2018-11-16 NOTE — PROGRESS NOTES
Klever Schaefer 52 y o  male MRN: [id number]    Encounter: [id number]      Assessment/Plan     Problem List Items Addressed This Visit     Hypertension, essential     Blood pressure above goal-continue current regimen-reassess next visit  Will also check urine microalbumin to creatinine ratio         Relevant Orders    Comprehensive metabolic panel Lab Collect    Dyslipidemia, goal LDL below 100     Will check fasting lipid profile         Relevant Orders    Lipid panel Lab Collect Lab Collect    Comprehensive metabolic panel Lab Collect    TSH, 3rd generation Lab Collect    T4, free Lab Collect    Obesity, Class I, BMI 30 0-34 9 (see actual BMI)     Counseled on metabolic complications of obesity-advised to see a nutritionist         Relevant Orders    Comprehensive metabolic panel Lab Collect    Type 2 diabetes mellitus with hyperglycemia, with long-term current use of insulin (HCC) - Primary     Lab Results   Component Value Date    HGBA1C 11 4 (H) 11/06/2018       Recent Labs      11/15/18   0939  11/15/18   1232   POCGLU  226*  201*     Uncontrolled type 2 diabetes complicated by retinopathy and neuropathy  Patient counseled on complications of uncontrolled type 2 diabetes-counseled on dietary and lifestyle modifications and weight loss-counseled on the importance of taking medications as directed  For now I am going to discontinue Lantus and start him on tresiba 90 units daily-continue Apidra 30 units before meals-advised to check blood sugars before eating intake insulin as directed  He will monitor blood sugars 3 to 4 times a day and send over log in 2 weeks  Also given information on personal continuous glucose monitor-he will check with his insurance to see if these devices at covered  Sent over fingerstick log in 2 weeks or sooner if he starts having any hypoglycemic episodes    Being on intensive insulin therapy he is at risk for severe hypoglycemia  He should be seeing a nutritionist however he states that he would be able to come to our office secondary to the distance-I have advised him to Dr  His primary care to see if he can see a nutritionist close to home    Blood Sugar Average: Last 72 hrs:           Relevant Medications    insulin degludec (TRESIBA FLEXTOUCH) 200 units/mL CONCENTRATED U-200 injection pen    Diabetes mellitus (HCC)    Relevant Medications    insulin degludec (TRESIBA FLEXTOUCH) 200 units/mL CONCENTRATED U-200 injection pen    Other Relevant Orders    Lipid panel Lab Collect Lab Collect    HEMOGLOBIN A1C W/ EAG ESTIMATION Lab Collect    Comprehensive metabolic panel Lab Collect    Microalbumin / creatinine urine ratio Lab Collect    TSH, 3rd generation Lab Collect    T4, free Lab Collect    Type 2 diabetes mellitus with diabetic polyneuropathy, with long-term current use of insulin (HCC)    Relevant Medications    insulin degludec (TRESIBA FLEXTOUCH) 200 units/mL CONCENTRATED U-200 injection pen    Other Relevant Orders    Lipid panel Lab Collect Lab Collect    HEMOGLOBIN A1C W/ EAG ESTIMATION Lab Collect    Comprehensive metabolic panel Lab Collect    Microalbumin / creatinine urine ratio Lab Collect    TSH, 3rd generation Lab Collect    T4, free Lab Collect        CC: Diabetes    History of Present Illness     HPI:  27-year-old gentleman referred here for evaluation of uncontrolled type 2 diabetes  He has had Type 2 DM for more than 20 years - has been on insulin therapy for the past 10 years   + neuropathy , + retinopathy , no CVD   Current regimen-  lantus 80 units once daily -  apidra 30 units before meals   Misses insulin once a day atleast   Checking f s 2 hours after eating - 200-400s  Denies any hypoglycemic episodes    C/o dizziness , vomiting , vertigo  - was in ED yesterday and received IV fluids     C/o polyuria , no polydypsia, + blurry vision , + numbness and tingling in feet   Weight stable    Seen a nutritionist a few years back - trying to watch diet   Review of Systems   Constitutional: Positive for fatigue  Negative for unexpected weight change  Eyes: Positive for visual disturbance  Respiratory: Negative for cough and shortness of breath  Cardiovascular: Negative for palpitations and leg swelling  Gastrointestinal: Positive for nausea and vomiting  Negative for constipation and diarrhea  Endocrine: Positive for polyuria  Negative for polydipsia  Musculoskeletal: Negative for gait problem  Psychiatric/Behavioral: Negative for sleep disturbance  All other systems reviewed and are negative  Historical Information   Past Medical History:   Diagnosis Date    Diabetes mellitus ([facility] [address] )     Hyperlipidemia     Hypertension      Past Surgical History:   Procedure Laterality Date    FOOT SURGERY Right      Social History   History   Alcohol Use    4 8 oz/week    8 Cans of beer per week     History   Drug Use No     History   Smoking Status    Never Smoker   Smokeless Tobacco    Never Used     Family History:   Family History   Problem Relation Age of Onset    Cancer Mother     Hypertension Father        Meds/Allergies   Current Outpatient Prescriptions   Medication Sig Dispense Refill    AVALIDE 300-12 5 MG per tablet       BD PEN NEEDLE DEV U/F 32G X 4 MM MISC       Blood Glucose Monitoring Suppl (ONE TOUCH ULTRA 2) w/Device KIT by Does not apply route 2 (two) times a day 1 each 0    glucose blood test strip 1 each by Other route as needed Use as instructed      insulin glulisine (APIDRA SOLOSTAR) 100 units/mL injection pen Inject 30 Units under the skin 3 (three) times a day with meals 5 pen 0    verapamil (VERELAN PM) 360 MG 24 hr capsule       insulin degludec (TRESIBA FLEXTOUCH) 200 units/mL CONCENTRATED U-200 injection pen 90 units once daily 3 pen 2     No current facility-administered medications for this visit        No Known Allergies    Objective   Vitals: Blood pressure 140/100, pulse (!) 107, height 6' 3" (1 905 m), weight 115 kg (254 lb 9 6 oz)  Physical Exam   Constitutional: He is oriented to person, place, and time  He appears well-developed and well-nourished  No distress  HENT:   Head: Normocephalic and atraumatic  Mouth/Throat: No oropharyngeal exudate  Eyes: Conjunctivae and EOM are normal  No scleral icterus  Neck: Normal range of motion  Neck supple  No thyromegaly present  Cardiovascular: Normal rate, regular rhythm and normal heart sounds  No murmur heard  Pulmonary/Chest: Effort normal and breath sounds normal  No respiratory distress  He has no wheezes  He has no rales  Abdominal: Soft  Bowel sounds are normal  He exhibits no distension  There is no tenderness  There is no rebound  Musculoskeletal: Normal range of motion  He exhibits no edema or deformity  Lymphadenopathy:     He has no cervical adenopathy  Neurological: He is alert and oriented to person, place, and time  Skin: Skin is warm and dry  No rash noted  No erythema  No pallor  Psychiatric: He has a normal mood and affect  His behavior is normal  Judgment and thought content normal        The history was obtained from the review of the chart, patient      Lab Results:   Lab Results   Component Value Date/Time    Hemoglobin A1C 11 4 (H) 11/06/2018 11:23 AM    WBC 9 23 11/15/2018 09:42 AM    Hemoglobin 16 6 11/15/2018 09:42 AM    Hematocrit 47 1 11/15/2018 09:42 AM    MCV 89 11/15/2018 09:42 AM    Platelets 690 79/03/0291 09:42 AM    BUN 12 11/15/2018 09:42 AM    BUN 27 (H) 11/06/2018 11:23 AM    Potassium 3 6 11/15/2018 09:42 AM    Potassium 4 9 11/06/2018 11:23 AM    Chloride 96 (L) 11/15/2018 09:42 AM    Chloride 97 (L) 11/06/2018 11:23 AM    CO2 30 11/15/2018 09:42 AM    CO2 28 11/06/2018 11:23 AM    Creatinine 0 78 11/15/2018 09:42 AM    Creatinine 1 13 11/06/2018 11:23 AM    AST 48 (H) 11/15/2018 09:42 AM    AST 50 (H) 11/06/2018 11:23 AM    ALT 82 (H) 11/15/2018 09:42 AM     (H) 11/06/2018 11:23 AM    Albumin 4 1 11/15/2018 09:42 AM    Albumin 3 8 11/06/2018 11:23 AM               Portions of the record may have been created with voice recognition software  Occasional wrong word or "sound a like" substitutions may have occurred due to the inherent limitations of voice recognition software  Read the chart carefully and recognize, using context, where substitutions have occurred

## 2018-11-16 NOTE — PATIENT INSTRUCTIONS
Hypoglycemia in a Person with Diabetes   WHAT YOU NEED TO KNOW:   What is hypoglycemia? Hypoglycemia is a serious condition that happens when your blood glucose (sugar) level drops too low  The blood sugar level is usually too high in a person with diabetes, but the level can also drop too low  It is important to follow your diabetes management plan to keep your blood sugar level steady  What increases my risk for hypoglycemia? · A missed meal, or a meal eaten later than usual     · Certain medicines, or too much insulin or other diabetes medicine     · More exercise than usual, without extra food     · Alcohol     · Pregnancy    · Decreased liver or kidney function  What are the signs and symptoms of hypoglycemia? · Headache, hunger, or nervousness     · Trouble thinking or moodiness     · Sweating, or a pounding heartbeat     · Forgetfulness, confusion, or double vision     · Weakness or trouble walking     · Numbness and tingling in your fingers or around your mouth     · Seizures or loss of consciousness  How do I manage hypoglycemia? · Check your blood sugar level right away if you have symptoms of hypoglycemia  Hypoglycemia is usually 70 mg/dL or below  Ask your healthcare provider what blood sugar level is too low for you  · If your blood sugar level is too low, eat or drink 15 grams of fast-acting carbohydrate  Examples of this amount of fast-acting carbohydrate are 4 ounces (½ cup) of fruit juice or 4 ounces of regular soda  Other examples are 2 tablespoons of raisins or 3 to 4 glucose tablets  Check your blood sugar level 15 minutes later  If the level is still low (less than 100 mg/dL), have another 15 grams of carbohydrate  When the level returns to 100 mg/dL, eat a snack or meal that contains carbohydrates  This will help prevent another drop in blood sugar  Always carefully follow your healthcare provider's instructions on how to treat low blood sugar levels       · Always carry a source of fast-acting carbohydrate  If you have symptoms of hypoglycemia and you do not have a blood glucose meter, have a source of fast-acting carbohydrate anyway  Avoid carbohydrate foods that are high in fat  The fat content may make it take longer to increase your blood sugar level  Ask your healthcare provider if you should carry a glucagon kit  Glucagon is a medicine that is injected when you develop severe hypoglycemia and become unconscious  Check the expiration date every month and replace it before it expires  · Teach others how to help you if you have symptoms of hypoglycemia  Tell them about the symptoms of hypoglycemia  Ask them to give you a source of fast-acting carbohydrate if you cannot get it yourself  Ask them to give you a glucagon injection if you have symptoms of hypoglycemia and you become unconscious or have a seizure  Ask them to call 911   This is an emergency  Tell them never to try to make you swallow anything if you faint or have a seizure  · Wear medical alert jewelry  or carry a card that says you have diabetes  Ask where to get these items  How do I prevent hypoglycemia? · Take diabetes medicine as directed  Take your medicine at the right time and in the right amount  Your healthcare provider may change your blood sugar goals if you get hypoglycemia often  · Eat regular meals and snacks  Talk to your dietitian or healthcare provider about a meal plan that is right for you  Do not skip meals  · Check your blood sugar level as directed  Ask your healthcare provider what your blood sugar levels should be before and after you eat  Ask when and how often to check your blood sugar level  You may need to check at least 3 times each day  Record your blood sugar level results and take the record with you when you see your healthcare provider  Your provider may use the record to make changes to your medicine, food, or exercise schedules             · Check your blood sugar level before you exercise  Exercise can decrease your blood sugar level  If your blood sugar level is less than 100 mg/dL, have a carbohydrate snack  Examples are 4 to 6 crackers, ½ banana, 8 ounces (1 cup) of nonfat or 1% milk, or 4 ounces (½ cup) of juice  If you will exercise for more than 1 hour, you may need to check your blood sugar level every 30 minutes  Your healthcare provider may also recommend that you check your blood sugar level after exercise  · Be aware of how alcohol affects your blood sugar level  Alcohol can cause your blood sugar level to drop for up to 12 hours after drinking  Ask your healthcare provider if alcohol is safe for you  If you drink alcohol, always have a snack or meal at the same time  Women should limit alcohol to 1 drink a day  Men should limit alcohol to 2 drinks a day  A drink of alcohol is 12 ounces of beer, 5 ounces of wine, or 1½ ounces of liquor  When should I or someone else call 911? · You have a seizure or pass out  · You feel you are going to pass out  · You have trouble thinking clearly  When should I seek immediate care? · Your blood sugar is less than 50 mg/dL and does not respond to treatment  When should I contact my healthcare provider? · You have had symptoms of low blood sugar several times  · You have questions about the amount of insulin or diabetes medicine you are taking  · You have questions or concerns about your condition or care  CARE AGREEMENT:   You have the right to help plan your care  Learn about your health condition and how it may be treated  Discuss treatment options with your caregivers to decide what care you want to receive  You always have the right to refuse treatment  The above information is an  only  It is not intended as medical advice for individual conditions or treatments   Talk to your doctor, nurse or pharmacist before following any medical regimen to see if it is safe and effective for you  © 2017 [employer] Information is for End User's use only and may not be sold, redistributed or otherwise used for commercial purposes  All illustrations and images included in CareNotes® are the copyrighted property of [employer]  or [employer] Murillo              STOP LANTUS   START TRESIBA 90 UNITS ONCE DAILY   TAKE APIDRA BEFORE ALL MEALS   CHECK SUGARS 3-4/DAY AND SEND OVER LOG IN 2 WEEKS

## 2018-11-16 NOTE — LETTER
November 16, 2018     Maurilio Saenz PA-C  [address]    Patient: Alisha Lock   YOB: 1965   Date of Visit: 11/16/2018       Dear Dr Henok Abarca:    Thank you for referring Alisha Lock to me for evaluation  Below are my notes for this consultation  If you have questions, please do not hesitate to call me  I look forward to following your patient along with you  Sincerely,        Cristiano Jones MD        CC: No Recipients  Cristiano Jones MD  11/16/2018 12:40 PM  Sign at close encounter   Alisha Lock 52 y o  male MRN: [id number]    Encounter: [id number]      Assessment/Plan     Problem List Items Addressed This Visit     Hypertension, essential     Blood pressure above goal-continue current regimen-reassess next visit  Will also check urine microalbumin to creatinine ratio         Relevant Orders    Comprehensive metabolic panel Lab Collect    Dyslipidemia, goal LDL below 100     Will check fasting lipid profile         Relevant Orders    Lipid panel Lab Collect Lab Collect    Comprehensive metabolic panel Lab Collect    TSH, 3rd generation Lab Collect    T4, free Lab Collect    Obesity, Class I, BMI 30 0-34 9 (see actual BMI)     Counseled on metabolic complications of obesity-advised to see a nutritionist         Relevant Orders    Comprehensive metabolic panel Lab Collect    Type 2 diabetes mellitus with hyperglycemia, with long-term current use of insulin (HCC) - Primary     Lab Results   Component Value Date    HGBA1C 11 4 (H) 11/06/2018       Recent Labs      11/15/18   0939  11/15/18   1232   POCGLU  226*  201*     Uncontrolled type 2 diabetes complicated by retinopathy and neuropathy  Patient counseled on complications of uncontrolled type 2 diabetes-counseled on dietary and lifestyle modifications and weight loss-counseled on the importance of taking medications as directed    For now I am going to discontinue Lantus and start him on tresiba 90 units daily-continue Apidra 30 units before meals-advised to check blood sugars before eating intake insulin as directed  He will monitor blood sugars 3 to 4 times a day and send over log in 2 weeks  Also given information on personal continuous glucose monitor-he will check with his insurance to see if these devices at covered  Sent over fingerstick log in 2 weeks or sooner if he starts having any hypoglycemic episodes  Being on intensive insulin therapy he is at risk for severe hypoglycemia  He should be seeing a nutritionist however he states that he would be able to come to our office secondary to the distance-I have advised him to Dr  His primary care to see if he can see a nutritionist close to home    Blood Sugar Average: Last 72 hrs:           Relevant Medications    insulin degludec (TRESIBA FLEXTOUCH) 200 units/mL CONCENTRATED U-200 injection pen    Diabetes mellitus (HCC)    Relevant Medications    insulin degludec (TRESIBA FLEXTOUCH) 200 units/mL CONCENTRATED U-200 injection pen    Other Relevant Orders    Lipid panel Lab Collect Lab Collect    HEMOGLOBIN A1C W/ EAG ESTIMATION Lab Collect    Comprehensive metabolic panel Lab Collect    Microalbumin / creatinine urine ratio Lab Collect    TSH, 3rd generation Lab Collect    T4, free Lab Collect    Type 2 diabetes mellitus with diabetic polyneuropathy, with long-term current use of insulin (HCC)    Relevant Medications    insulin degludec (TRESIBA FLEXTOUCH) 200 units/mL CONCENTRATED U-200 injection pen    Other Relevant Orders    Lipid panel Lab Collect Lab Collect    HEMOGLOBIN A1C W/ EAG ESTIMATION Lab Collect    Comprehensive metabolic panel Lab Collect    Microalbumin / creatinine urine ratio Lab Collect    TSH, 3rd generation Lab Collect    T4, free Lab Collect        CC: Diabetes    History of Present Illness     HPI:  49-year-old gentleman referred here for evaluation of uncontrolled type 2 diabetes    He has had Type 2 DM for more than 20 years - has been on insulin therapy for the past 10 years   + neuropathy , + retinopathy , no CVD   Current regimen-  lantus 80 units once daily -  apidra 30 units before meals   Misses insulin once a day atleast   Checking f s 2 hours after eating - 200-400s  Denies any hypoglycemic episodes    C/o dizziness , vomiting , vertigo  - was in ED yesterday and received IV fluids     C/o polyuria , no polydypsia, + blurry vision , + numbness and tingling in feet   Weight stable    Seen a nutritionist a few years back - trying to watch diet   Review of Systems   Constitutional: Positive for fatigue  Negative for unexpected weight change  Eyes: Positive for visual disturbance  Respiratory: Negative for cough and shortness of breath  Cardiovascular: Negative for palpitations and leg swelling  Gastrointestinal: Positive for nausea and vomiting  Negative for constipation and diarrhea  Endocrine: Positive for polyuria  Negative for polydipsia  Musculoskeletal: Negative for gait problem  Psychiatric/Behavioral: Negative for sleep disturbance  All other systems reviewed and are negative        Historical Information   Past Medical History:   Diagnosis Date    [facility] mellitus ([facility] [address]     Hyperlipidemia     Hypertension      Past Surgical History:   Procedure Laterality Date    FOOT SURGERY Right      Social History   History   Alcohol Use    4 8 oz/week    8 Cans of beer per week     History   Drug Use No     History   Smoking Status    Never Smoker   Smokeless Tobacco    Never Used     Family History:   Family History   Problem Relation Age of Onset    Cancer Mother     Hypertension Father        Meds/Allergies   Current Outpatient Prescriptions   Medication Sig Dispense Refill    AVALIDE 300-12 5 MG per tablet       BD PEN NEEDLE DEV U/F 32G X 4 MM MISC       Blood Glucose Monitoring Suppl (ONE TOUCH ULTRA 2) w/Device KIT by Does not apply route 2 (two) times a day 1 each 0    glucose blood test strip 1 each by Other route as needed Use as instructed      insulin glulisine (APIDRA SOLOSTAR) 100 units/mL injection pen Inject 30 Units under the skin 3 (three) times a day with meals 5 pen 0    verapamil (VERELAN PM) 360 MG 24 hr capsule       insulin degludec (TRESIBA FLEXTOUCH) 200 units/mL CONCENTRATED U-200 injection pen 90 units once daily 3 pen 2     No current facility-administered medications for this visit  No Known Allergies    Objective   Vitals: Blood pressure 140/100, pulse (!) 107, height 6' 3" (1 905 m), weight 115 kg (254 lb 9 6 oz)  Physical Exam   Constitutional: He is oriented to person, place, and time  He appears well-developed and well-nourished  No distress  HENT:   Head: Normocephalic and atraumatic  Mouth/Throat: No oropharyngeal exudate  Eyes: Conjunctivae and EOM are normal  No scleral icterus  Neck: Normal range of motion  Neck supple  No thyromegaly present  Cardiovascular: Normal rate, regular rhythm and normal heart sounds  No murmur heard  Pulmonary/Chest: Effort normal and breath sounds normal  No respiratory distress  He has no wheezes  He has no rales  Abdominal: Soft  Bowel sounds are normal  He exhibits no distension  There is no tenderness  There is no rebound  Musculoskeletal: Normal range of motion  He exhibits no edema or deformity  Lymphadenopathy:     He has no cervical adenopathy  Neurological: He is alert and oriented to person, place, and time  Skin: Skin is warm and dry  No rash noted  No erythema  No pallor  Psychiatric: He has a normal mood and affect  His behavior is normal  Judgment and thought content normal        The history was obtained from the review of the chart, patient      Lab Results:   Lab Results   Component Value Date/Time    Hemoglobin A1C 11 4 (H) 11/06/2018 11:23 AM    WBC 9 23 11/15/2018 09:42 AM    Hemoglobin 16 6 11/15/2018 09:42 AM    Hematocrit 47 1 11/15/2018 09:42 AM MCV 89 11/15/2018 09:42 AM    Platelets 496 32/85/7657 09:42 AM    BUN 12 11/15/2018 09:42 AM    BUN 27 (H) 11/06/2018 11:23 AM    Potassium 3 6 11/15/2018 09:42 AM    Potassium 4 9 11/06/2018 11:23 AM    Chloride 96 (L) 11/15/2018 09:42 AM    Chloride 97 (L) 11/06/2018 11:23 AM    CO2 30 11/15/2018 09:42 AM    CO2 28 11/06/2018 11:23 AM    Creatinine 0 78 11/15/2018 09:42 AM    Creatinine 1 13 11/06/2018 11:23 AM    AST 48 (H) 11/15/2018 09:42 AM    AST 50 (H) 11/06/2018 11:23 AM    ALT 82 (H) 11/15/2018 09:42 AM     (H) 11/06/2018 11:23 AM    Albumin 4 1 11/15/2018 09:42 AM    Albumin 3 8 11/06/2018 11:23 AM               Portions of the record may have been created with voice recognition software  Occasional wrong word or "sound a like" substitutions may have occurred due to the inherent limitations of voice recognition software  Read the chart carefully and recognize, using context, where substitutions have occurred

## 2018-11-26 ENCOUNTER — OFFICE VISIT (OUTPATIENT)
Dept: FAMILY MEDICINE CLINIC | Facility: CLINIC | Age: 53
End: 2018-11-26
Payer: COMMERCIAL

## 2018-11-26 VITALS
BODY MASS INDEX: 30.46 KG/M2 | HEIGHT: 75 IN | TEMPERATURE: 97.9 F | HEART RATE: 89 BPM | DIASTOLIC BLOOD PRESSURE: 82 MMHG | OXYGEN SATURATION: 98 % | WEIGHT: 245 LBS | SYSTOLIC BLOOD PRESSURE: 124 MMHG

## 2018-11-26 DIAGNOSIS — Z79.4 TYPE 2 DIABETES MELLITUS WITH HYPERGLYCEMIA, WITH LONG-TERM CURRENT USE OF INSULIN (HCC): ICD-10-CM

## 2018-11-26 DIAGNOSIS — E11.42 TYPE 2 DIABETES MELLITUS WITH DIABETIC POLYNEUROPATHY, WITH LONG-TERM CURRENT USE OF INSULIN (HCC): Primary | ICD-10-CM

## 2018-11-26 DIAGNOSIS — R42 VERTIGO: ICD-10-CM

## 2018-11-26 DIAGNOSIS — H93.11 RIGHT-SIDED TINNITUS: ICD-10-CM

## 2018-11-26 DIAGNOSIS — E11.65 TYPE 2 DIABETES MELLITUS WITH HYPERGLYCEMIA, WITH LONG-TERM CURRENT USE OF INSULIN (HCC): ICD-10-CM

## 2018-11-26 DIAGNOSIS — Z79.4 TYPE 2 DIABETES MELLITUS WITH DIABETIC POLYNEUROPATHY, WITH LONG-TERM CURRENT USE OF INSULIN (HCC): Primary | ICD-10-CM

## 2018-11-26 PROCEDURE — 1036F TOBACCO NON-USER: CPT | Performed by: PHYSICIAN ASSISTANT

## 2018-11-26 PROCEDURE — 3008F BODY MASS INDEX DOCD: CPT | Performed by: PHYSICIAN ASSISTANT

## 2018-11-26 PROCEDURE — 99213 OFFICE O/P EST LOW 20 MIN: CPT | Performed by: PHYSICIAN ASSISTANT

## 2018-11-26 NOTE — PROGRESS NOTES
Assessment/Plan:       Diagnoses and all orders for this visit:    Type 2 diabetes mellitus with diabetic polyneuropathy, with long-term current use of insulin ([facility])  -     glucose blood test strip; 1 each by Other route 3 (three) times a day Use as instructed    Type 2 diabetes mellitus with hyperglycemia, with long-term current use of insulin ([facility])  -     insulin degludec (TRESIBA FLEXTOUCH) 200 units/mL CONCENTRATED U-200 injection pen; 90 units once daily    Right-sided tinnitus  -     Ambulatory Referral to Otolaryngology; Future    Vertigo  -     Ambulatory Referral to Otolaryngology; Future            Subjective:      Patient ID: Jose L Gould is a 52 y o  male  Patient is here for follow-up from his ER visit and endocrinologist   He was started on her see the and his blood sugars are ranging in the 200s most of the time  He will run out before he sees endocrinologist again I have renewed it for it now  He still has of slight ringing in his right ear but he is not feeling dizzy at this point  We will refer him to otolaryngology for the tinnitus in the vertigo  The following portions of the patient's history were reviewed and updated as appropriate:   He has a past medical history of Diabetes mellitus ([address] ); Hyperlipidemia; and Hypertension  ,   does not have any pertinent problems on file  ,   has a past surgical history that includes Foot surgery (Right)  ,  family history includes Cancer in his mother; Hypertension in his father  ,   reports that he has never smoked  He has never used smokeless tobacco  He reports that he drinks about 4 8 oz of alcohol per week   He reports that he does not use drugs  ,  has No Known Allergies     Current Outpatient Prescriptions   Medication Sig Dispense Refill    AVALIDE 300-12 5 MG per tablet       BD PEN NEEDLE DEV U/F 32G X 4 MM MISC       Blood Glucose Monitoring Suppl (ONE TOUCH ULTRA 2) w/Device KIT by Does not apply route 2 (two) times a day 1 each 0  glucose blood test strip 1 each by Other route 3 (three) times a day Use as instructed 100 each 2    insulin degludec (TRESIBA FLEXTOUCH) 200 units/mL CONCENTRATED U-200 injection pen 90 units once daily 3 pen 1    insulin glulisine (APIDRA SOLOSTAR) 100 units/mL injection pen Inject 30 Units under the skin 3 (three) times a day with meals 5 pen 0    verapamil (VERELAN PM) 360 MG 24 hr capsule        No current facility-administered medications for this visit  Review of Systems   Constitutional: Negative for appetite change, chills and fatigue  HENT: Positive for tinnitus  Respiratory: Negative for shortness of breath  Cardiovascular: Negative for chest pain and palpitations  Gastrointestinal: Negative for abdominal pain  Neurological: Negative for dizziness, syncope, weakness, light-headedness and headaches  Objective:  Vitals:    11/26/18 1426   BP: 124/82   Pulse: 89   Temp: 97 9 °F (36 6 °C)   SpO2: 98%   Weight: 111 kg (245 lb)   Height: 6' 3" (1 905 m)     Body mass index is 30 62 kg/m²  Physical Exam   Constitutional: He is oriented to person, place, and time  He appears well-developed and well-nourished  HENT:   Head: Normocephalic  Cardiovascular: Normal rate and regular rhythm  Pulmonary/Chest: Effort normal and breath sounds normal    Neurological: He is alert and oriented to person, place, and time  Psychiatric: He has a normal mood and affect  His behavior is normal    Nursing note and vitals reviewed

## 2018-11-28 DIAGNOSIS — E11.65 TYPE 2 DIABETES MELLITUS WITH HYPERGLYCEMIA, WITH LONG-TERM CURRENT USE OF INSULIN (HCC): ICD-10-CM

## 2018-11-28 DIAGNOSIS — Z79.4 TYPE 2 DIABETES MELLITUS WITH HYPERGLYCEMIA, WITH LONG-TERM CURRENT USE OF INSULIN (HCC): ICD-10-CM

## 2019-01-14 DIAGNOSIS — Z79.4 TYPE 2 DIABETES MELLITUS WITH HYPERGLYCEMIA, WITH LONG-TERM CURRENT USE OF INSULIN (HCC): ICD-10-CM

## 2019-01-14 DIAGNOSIS — E11.65 TYPE 2 DIABETES MELLITUS WITH HYPERGLYCEMIA, WITH LONG-TERM CURRENT USE OF INSULIN (HCC): ICD-10-CM

## 2019-01-14 RX ORDER — INSULIN DEGLUDEC 200 U/ML
INJECTION, SOLUTION SUBCUTANEOUS
Qty: 27 ML | Refills: 0 | Status: SHIPPED | OUTPATIENT
Start: 2019-01-14 | End: 2019-09-12

## 2019-01-24 DIAGNOSIS — E11.65 TYPE 2 DIABETES MELLITUS WITH HYPERGLYCEMIA, WITH LONG-TERM CURRENT USE OF INSULIN (HCC): Primary | ICD-10-CM

## 2019-01-24 DIAGNOSIS — Z79.4 TYPE 2 DIABETES MELLITUS WITH HYPERGLYCEMIA, WITH LONG-TERM CURRENT USE OF INSULIN (HCC): Primary | ICD-10-CM

## 2019-01-24 RX ORDER — PEN NEEDLE, DIABETIC 32GX 5/32"
NEEDLE, DISPOSABLE MISCELLANEOUS 4 TIMES DAILY
Qty: 400 EACH | Refills: 1 | Status: SHIPPED | OUTPATIENT
Start: 2019-01-24

## 2019-02-27 LAB
LEFT EYE DIABETIC RETINOPATHY: NORMAL
RIGHT EYE DIABETIC RETINOPATHY: NORMAL
SEVERITY (EYE EXAM): NORMAL

## 2019-03-06 DIAGNOSIS — E11.42 TYPE 2 DIABETES MELLITUS WITH DIABETIC POLYNEUROPATHY, WITH LONG-TERM CURRENT USE OF INSULIN (HCC): ICD-10-CM

## 2019-03-06 DIAGNOSIS — Z79.4 TYPE 2 DIABETES MELLITUS WITH DIABETIC POLYNEUROPATHY, WITH LONG-TERM CURRENT USE OF INSULIN (HCC): ICD-10-CM

## 2019-04-11 ENCOUNTER — OFFICE VISIT (OUTPATIENT)
Dept: FAMILY MEDICINE CLINIC | Facility: CLINIC | Age: 54
End: 2019-04-11
Payer: COMMERCIAL

## 2019-04-11 VITALS
HEIGHT: 75 IN | HEART RATE: 94 BPM | SYSTOLIC BLOOD PRESSURE: 122 MMHG | OXYGEN SATURATION: 98 % | DIASTOLIC BLOOD PRESSURE: 86 MMHG | WEIGHT: 258 LBS | TEMPERATURE: 98.2 F | BODY MASS INDEX: 32.08 KG/M2

## 2019-04-11 DIAGNOSIS — E11.42 DIABETIC POLYNEUROPATHY ASSOCIATED WITH TYPE 2 DIABETES MELLITUS (HCC): ICD-10-CM

## 2019-04-11 DIAGNOSIS — Z00.00 ANNUAL PHYSICAL EXAM: Primary | ICD-10-CM

## 2019-04-11 DIAGNOSIS — Z12.11 SCREENING FOR COLORECTAL CANCER: ICD-10-CM

## 2019-04-11 DIAGNOSIS — E11.9 TYPE 2 DIABETES MELLITUS WITH HEMOGLOBIN A1C GOAL OF LESS THAN 7.0% (HCC): ICD-10-CM

## 2019-04-11 DIAGNOSIS — I10 HYPERTENSION, ESSENTIAL: ICD-10-CM

## 2019-04-11 DIAGNOSIS — E11.42 TYPE 2 DIABETES MELLITUS WITH DIABETIC POLYNEUROPATHY, WITH LONG-TERM CURRENT USE OF INSULIN (HCC): ICD-10-CM

## 2019-04-11 DIAGNOSIS — Z79.4 TYPE 2 DIABETES MELLITUS WITH DIABETIC POLYNEUROPATHY, WITH LONG-TERM CURRENT USE OF INSULIN (HCC): ICD-10-CM

## 2019-04-11 DIAGNOSIS — Z11.59 NEED FOR HEPATITIS C SCREENING TEST: ICD-10-CM

## 2019-04-11 DIAGNOSIS — Z12.12 SCREENING FOR COLORECTAL CANCER: ICD-10-CM

## 2019-04-11 PROBLEM — L03.031 PARONYCHIA OF GREAT TOE, RIGHT: Status: RESOLVED | Noted: 2018-10-29 | Resolved: 2019-04-11

## 2019-04-11 PROBLEM — H66.003 ACUTE SUPPURATIVE OTITIS MEDIA OF BOTH EARS WITHOUT SPONTANEOUS RUPTURE OF TYMPANIC MEMBRANES: Status: RESOLVED | Noted: 2018-10-29 | Resolved: 2019-04-11

## 2019-04-11 PROCEDURE — 99396 PREV VISIT EST AGE 40-64: CPT | Performed by: PHYSICIAN ASSISTANT

## 2019-04-11 RX ORDER — OMEPRAZOLE 40 MG/1
CAPSULE, DELAYED RELEASE ORAL
COMMUNITY
Start: 2019-01-11 | End: 2019-10-11 | Stop reason: SDUPTHER

## 2019-08-19 DIAGNOSIS — I10 HYPERTENSION, ESSENTIAL: Primary | ICD-10-CM

## 2019-08-19 RX ORDER — IRBESARTAN/HYDROCHLOROTHIAZIDE 300-12.5MG
1 TABLET ORAL DAILY
Qty: 30 TABLET | Refills: 1 | Status: SHIPPED | OUTPATIENT
Start: 2019-08-19 | End: 2019-10-11 | Stop reason: SDUPTHER

## 2019-09-12 ENCOUNTER — OFFICE VISIT (OUTPATIENT)
Dept: FAMILY MEDICINE CLINIC | Facility: CLINIC | Age: 54
End: 2019-09-12
Payer: COMMERCIAL

## 2019-09-12 VITALS
TEMPERATURE: 98.5 F | SYSTOLIC BLOOD PRESSURE: 134 MMHG | OXYGEN SATURATION: 98 % | BODY MASS INDEX: 31.21 KG/M2 | WEIGHT: 251 LBS | HEIGHT: 75 IN | DIASTOLIC BLOOD PRESSURE: 90 MMHG | HEART RATE: 98 BPM | RESPIRATION RATE: 16 BRPM

## 2019-09-12 DIAGNOSIS — Z12.5 PROSTATE CANCER SCREENING: ICD-10-CM

## 2019-09-12 DIAGNOSIS — E11.65 TYPE 2 DIABETES MELLITUS WITH HYPERGLYCEMIA, WITH LONG-TERM CURRENT USE OF INSULIN (HCC): Primary | ICD-10-CM

## 2019-09-12 DIAGNOSIS — Z79.4 TYPE 2 DIABETES MELLITUS WITH DIABETIC POLYNEUROPATHY, WITH LONG-TERM CURRENT USE OF INSULIN (HCC): ICD-10-CM

## 2019-09-12 DIAGNOSIS — E11.9 TYPE 2 DIABETES MELLITUS WITH HEMOGLOBIN A1C GOAL OF LESS THAN 7.0% (HCC): ICD-10-CM

## 2019-09-12 DIAGNOSIS — E11.42 TYPE 2 DIABETES MELLITUS WITH DIABETIC POLYNEUROPATHY, WITH LONG-TERM CURRENT USE OF INSULIN (HCC): ICD-10-CM

## 2019-09-12 DIAGNOSIS — Z11.59 NEED FOR HEPATITIS C SCREENING TEST: ICD-10-CM

## 2019-09-12 DIAGNOSIS — Z79.4 TYPE 2 DIABETES MELLITUS WITH HYPERGLYCEMIA, WITH LONG-TERM CURRENT USE OF INSULIN (HCC): Primary | ICD-10-CM

## 2019-09-12 DIAGNOSIS — K63.5 POLYP OF COLON, UNSPECIFIED PART OF COLON, UNSPECIFIED TYPE: ICD-10-CM

## 2019-09-12 DIAGNOSIS — K21.9 GASTROESOPHAGEAL REFLUX DISEASE WITHOUT ESOPHAGITIS: ICD-10-CM

## 2019-09-12 DIAGNOSIS — E78.5 DYSLIPIDEMIA, GOAL LDL BELOW 100: ICD-10-CM

## 2019-09-12 DIAGNOSIS — I10 HYPERTENSION, ESSENTIAL: ICD-10-CM

## 2019-09-12 PROBLEM — G62.9 NEUROPATHY: Status: RESOLVED | Noted: 2018-09-24 | Resolved: 2019-09-12

## 2019-09-12 LAB — SL AMB POCT HEMOGLOBIN AIC: 12.5 (ref ?–6.5)

## 2019-09-12 PROCEDURE — 83036 HEMOGLOBIN GLYCOSYLATED A1C: CPT | Performed by: FAMILY MEDICINE

## 2019-09-12 PROCEDURE — 99214 OFFICE O/P EST MOD 30 MIN: CPT | Performed by: FAMILY MEDICINE

## 2019-09-12 PROCEDURE — 3046F HEMOGLOBIN A1C LEVEL >9.0%: CPT | Performed by: FAMILY MEDICINE

## 2019-09-12 NOTE — ASSESSMENT & PLAN NOTE
Lab Results   Component Value Date    HGBA1C 12 5 (A) 09/12/2019       No results for input(s): POCGLU in the last 72 hours  Blood Sugar Average: Last 72 hrs:   use Advil as needed for his neuropathy  Better control of his blood sugar is recommended

## 2019-09-12 NOTE — PROGRESS NOTES
Assessment/Plan:  Return visit in 6 weeks     Diagnoses and all orders for this visit:    Type 2 diabetes mellitus with hyperglycemia, with long-term current use of insulin ([facility])  -     insulin glargine (LANTUS SOLOSTAR) 100 units/mL injection pen; Inject 70 Units under the skin daily  -     Ambulatory referral to Endocrinology; Future    Type 2 diabetes mellitus with hemoglobin A1c goal of less than 7 0% ([facility])  -     POCT hemoglobin A1c  -     Microalbumin / creatinine urine ratio  -     Urinalysis with reflex to microscopic  -     Hemoglobin A1C; Future    Type 2 diabetes mellitus with diabetic polyneuropathy, with long-term current use of insulin ([facility])    Hypertension, essential    Dyslipidemia, goal LDL below 100  -     CBC and differential; Future  -     Comprehensive metabolic panel; Future  -     Lipid panel; Future    Prostate cancer screening  -     PSA, Total Screen; Future    Need for hepatitis C screening test  -     Hepatitis C antibody; Future    Gastroesophageal reflux disease without esophagitis    Polyp of colon, unspecified part of colon, unspecified type        Hypertension, essential  Continue Avalide 300-12 5 mg and verapamil 360 mg daily    Type 2 diabetes mellitus with diabetic polyneuropathy, with long-term current use of insulin ([facility])  Lab Results   Component Value Date    HGBA1C 12 5 (A) 09/12/2019       No results for input(s): POCGLU in the last 72 hours  Blood Sugar Average: Last 72 hrs:   use Advil as needed for his neuropathy  Better control of his blood sugar is recommended  Colon polyps  We will refer him for colonoscopy after next visit  Gastroesophageal reflux disease without esophagitis  Continue omeprazole 40 mg daily        Subjective:      Patient ID: Alysa Blancas is a 62 y o  male  Patient comes in for checkup  He complains of pain and numbness in his feet and legs  His diabetic neuropathy has been unresponsive to gabapentin and Lyrica    He complains of increased flatulence  He had a colonoscopy about 5 years ago and had colon polyps  He is not sure if he had diverticulosis  His bowel habits are somewhat irregular  The following portions of the patient's history were reviewed and updated as appropriate:   He has a past medical history of Diabetes mellitus ([address] ), Hyperlipidemia, and Hypertension  ,  does not have any pertinent problems on file  ,   has a past surgical history that includes Foot surgery (Right)  ,  family history includes Cancer in his mother; Hypertension in his father  ,   reports that he has never smoked  He has never used smokeless tobacco  He reports that he drinks about 8 0 standard drinks of alcohol per week  He reports that he does not use drugs  ,  has No Known Allergies     Current Outpatient Medications   Medication Sig Dispense Refill    AVALIDE 300-12 5 MG per tablet Take 1 tablet by mouth daily 30 tablet 1    BD PEN NEEDLE DEV U/F 32G X 4 MM MISC Inject under the skin 4 (four) times a day 400 each 1    Blood Glucose Monitoring Suppl (ONE TOUCH ULTRA 2) w/Device KIT by Does not apply route 2 (two) times a day 1 each 0    glucose blood test strip 1 each by Other route 3 (three) times a day Use as instructed 100 each 2    insulin glargine (LANTUS SOLOSTAR) 100 units/mL injection pen Inject 70 Units under the skin daily 5 pen 5    insulin glulisine (APIDRA SOLOSTAR) 100 units/mL injection pen Inject 30 Units under the skin 3 (three) times a day with meals 15 pen 1    omeprazole (PriLOSEC) 40 MG capsule       verapamil (VERELAN PM) 360 MG 24 hr capsule        No current facility-administered medications for this visit  Review of Systems   Constitutional: Negative  Respiratory: Negative  Cardiovascular: Negative  Neurological: Positive for numbness           Objective:  Vitals:    09/12/19 1451   BP: 134/90   Pulse: 98   Resp: 16   Temp: 98 5 °F (36 9 °C)   SpO2: 98%   Weight: 114 kg (251 lb)   Height: 6' 3" (1 905 m)     Body mass index is 31 37 kg/m²  Physical Exam   Constitutional: He is oriented to person, place, and time  He appears well-developed and well-nourished  HENT:   Head: Normocephalic and atraumatic  Right Ear: Tympanic membrane and external ear normal    Left Ear: Tympanic membrane and external ear normal    Mouth/Throat: Oropharynx is clear and moist    Eyes: Pupils are equal, round, and reactive to light  EOM are normal    Neck: Neck supple  Cardiovascular: Normal rate, regular rhythm and normal heart sounds  Pulses are no weak pulses  Pulses:       Dorsalis pedis pulses are 1+ on the right side, and 1+ on the left side  Posterior tibial pulses are 1+ on the right side, and 1+ on the left side  Pulmonary/Chest: Effort normal and breath sounds normal    Abdominal: Soft  Bowel sounds are normal    Musculoskeletal: Normal range of motion  Feet:   Right Foot:   Skin Integrity: Negative for ulcer, skin breakdown, erythema, warmth, callus or dry skin  Left Foot:   Skin Integrity: Negative for ulcer, skin breakdown, erythema, warmth, callus or dry skin  Neurological: He is alert and oriented to person, place, and time  Skin: Skin is warm and dry  Psychiatric: He has a normal mood and affect  Thought content normal      Diabetic Foot Exam    Patient's shoes and socks removed  Right Foot/Ankle   Right Foot Inspection  Skin Exam: skin normal and skin intact no dry skin, no warmth, no callus, no erythema, no maceration, no abnormal color, no pre-ulcer, no ulcer and no callus                          Toe Exam: ROM and strength within normal limits  Sensory   Vibration: absent  Proprioception: absent   Monofilament testing: absent  Vascular    The right DP pulse is 1+  The right PT pulse is 1+       Left Foot/Ankle  Left Foot Inspection  Skin Exam: skin normal and skin intactno dry skin, no warmth, no erythema, no maceration, normal color, no pre-ulcer, no ulcer and no callus Toe Exam: ROM and strength within normal limits                   Sensory   Vibration: absent  Proprioception: absent  Monofilament: absent  Vascular    The left DP pulse is 1+  The left PT pulse is 1+  Assign Risk Category:  No deformity present; Loss of protective sensation; No weak pulses       Risk: 2  BMI Counseling: Body mass index is 31 37 kg/m²  The BMI is above normal  Exercise recommendations include vigorous aerobic physical activity for 75 minutes/week

## 2019-09-27 ENCOUNTER — APPOINTMENT (OUTPATIENT)
Dept: LAB | Facility: HOSPITAL | Age: 54
End: 2019-09-27
Attending: FAMILY MEDICINE
Payer: COMMERCIAL

## 2019-09-27 DIAGNOSIS — E11.9 TYPE 2 DIABETES MELLITUS WITH HEMOGLOBIN A1C GOAL OF LESS THAN 7.0% (HCC): ICD-10-CM

## 2019-09-27 DIAGNOSIS — E78.5 DYSLIPIDEMIA, GOAL LDL BELOW 100: ICD-10-CM

## 2019-09-27 DIAGNOSIS — Z12.5 PROSTATE CANCER SCREENING: ICD-10-CM

## 2019-09-27 DIAGNOSIS — Z11.59 NEED FOR HEPATITIS C SCREENING TEST: ICD-10-CM

## 2019-09-27 LAB
ALBUMIN SERPL BCP-MCNC: 3.5 G/DL (ref 3.5–5)
ALP SERPL-CCNC: 107 U/L (ref 46–116)
ALT SERPL W P-5'-P-CCNC: 115 U/L (ref 12–78)
ANION GAP SERPL CALCULATED.3IONS-SCNC: 11 MMOL/L (ref 4–13)
AST SERPL W P-5'-P-CCNC: 69 U/L (ref 5–45)
BACTERIA UR QL AUTO: ABNORMAL /HPF
BASOPHILS # BLD AUTO: 0.07 THOUSANDS/ΜL (ref 0–0.1)
BASOPHILS NFR BLD AUTO: 1 % (ref 0–1)
BILIRUB SERPL-MCNC: 0.5 MG/DL (ref 0.2–1)
BILIRUB UR QL STRIP: NEGATIVE
BUN SERPL-MCNC: 19 MG/DL (ref 5–25)
CALCIUM SERPL-MCNC: 9.4 MG/DL (ref 8.3–10.1)
CHLORIDE SERPL-SCNC: 100 MMOL/L (ref 100–108)
CHOLEST SERPL-MCNC: 197 MG/DL (ref 50–200)
CLARITY UR: CLEAR
CO2 SERPL-SCNC: 25 MMOL/L (ref 21–32)
COLOR UR: YELLOW
CREAT SERPL-MCNC: 0.87 MG/DL (ref 0.6–1.3)
CREAT UR-MCNC: 70.9 MG/DL
EOSINOPHIL # BLD AUTO: 0.33 THOUSAND/ΜL (ref 0–0.61)
EOSINOPHIL NFR BLD AUTO: 4 % (ref 0–6)
ERYTHROCYTE [DISTWIDTH] IN BLOOD BY AUTOMATED COUNT: 11.6 % (ref 11.6–15.1)
EST. AVERAGE GLUCOSE BLD GHB EST-MCNC: 295 MG/DL
GFR SERPL CREATININE-BSD FRML MDRD: 96 ML/MIN/1.73SQ M
GLUCOSE P FAST SERPL-MCNC: 246 MG/DL (ref 65–99)
GLUCOSE UR STRIP-MCNC: ABNORMAL MG/DL
HBA1C MFR BLD: 11.9 % (ref 4.2–6.3)
HCT VFR BLD AUTO: 42.8 % (ref 36.5–49.3)
HCV AB SER QL: NORMAL
HDLC SERPL-MCNC: 43 MG/DL (ref 40–60)
HGB BLD-MCNC: 14.3 G/DL (ref 12–17)
HGB UR QL STRIP.AUTO: ABNORMAL
IMM GRANULOCYTES # BLD AUTO: 0.04 THOUSAND/UL (ref 0–0.2)
IMM GRANULOCYTES NFR BLD AUTO: 1 % (ref 0–2)
KETONES UR STRIP-MCNC: NEGATIVE MG/DL
LDLC SERPL CALC-MCNC: 127 MG/DL (ref 0–100)
LEUKOCYTE ESTERASE UR QL STRIP: ABNORMAL
LYMPHOCYTES # BLD AUTO: 2.36 THOUSANDS/ΜL (ref 0.6–4.47)
LYMPHOCYTES NFR BLD AUTO: 28 % (ref 14–44)
MCH RBC QN AUTO: 31.2 PG (ref 26.8–34.3)
MCHC RBC AUTO-ENTMCNC: 33.4 G/DL (ref 31.4–37.4)
MCV RBC AUTO: 93 FL (ref 82–98)
MICROALBUMIN UR-MCNC: 160 MG/L (ref 0–20)
MICROALBUMIN/CREAT 24H UR: 226 MG/G CREATININE (ref 0–30)
MONOCYTES # BLD AUTO: 1.04 THOUSAND/ΜL (ref 0.17–1.22)
MONOCYTES NFR BLD AUTO: 13 % (ref 4–12)
NEUTROPHILS # BLD AUTO: 4.48 THOUSANDS/ΜL (ref 1.85–7.62)
NEUTS SEG NFR BLD AUTO: 53 % (ref 43–75)
NITRITE UR QL STRIP: NEGATIVE
NON-SQ EPI CELLS URNS QL MICRO: ABNORMAL /HPF
NONHDLC SERPL-MCNC: 154 MG/DL
NRBC BLD AUTO-RTO: 0 /100 WBCS
PH UR STRIP.AUTO: 5.5 [PH]
PLATELET # BLD AUTO: 217 THOUSANDS/UL (ref 149–390)
PMV BLD AUTO: 11.6 FL (ref 8.9–12.7)
POTASSIUM SERPL-SCNC: 4.4 MMOL/L (ref 3.5–5.3)
PROT SERPL-MCNC: 7.6 G/DL (ref 6.4–8.2)
PROT UR STRIP-MCNC: ABNORMAL MG/DL
PSA SERPL-MCNC: 0.4 NG/ML (ref 0–4)
RBC # BLD AUTO: 4.58 MILLION/UL (ref 3.88–5.62)
RBC #/AREA URNS AUTO: ABNORMAL /HPF
SODIUM SERPL-SCNC: 136 MMOL/L (ref 136–145)
SP GR UR STRIP.AUTO: 1.02 (ref 1–1.03)
TRIGL SERPL-MCNC: 137 MG/DL
UROBILINOGEN UR QL STRIP.AUTO: 0.2 E.U./DL
WBC # BLD AUTO: 8.32 THOUSAND/UL (ref 4.31–10.16)
WBC #/AREA URNS AUTO: ABNORMAL /HPF

## 2019-09-27 PROCEDURE — 82570 ASSAY OF URINE CREATININE: CPT | Performed by: FAMILY MEDICINE

## 2019-09-27 PROCEDURE — 80061 LIPID PANEL: CPT

## 2019-09-27 PROCEDURE — 80053 COMPREHEN METABOLIC PANEL: CPT

## 2019-09-27 PROCEDURE — 81001 URINALYSIS AUTO W/SCOPE: CPT | Performed by: FAMILY MEDICINE

## 2019-09-27 PROCEDURE — 83036 HEMOGLOBIN GLYCOSYLATED A1C: CPT

## 2019-09-27 PROCEDURE — G0103 PSA SCREENING: HCPCS

## 2019-09-27 PROCEDURE — 82043 UR ALBUMIN QUANTITATIVE: CPT | Performed by: FAMILY MEDICINE

## 2019-09-27 PROCEDURE — 36415 COLL VENOUS BLD VENIPUNCTURE: CPT

## 2019-09-27 PROCEDURE — 85025 COMPLETE CBC W/AUTO DIFF WBC: CPT

## 2019-09-27 PROCEDURE — 86803 HEPATITIS C AB TEST: CPT

## 2019-09-30 ENCOUNTER — TELEPHONE (OUTPATIENT)
Dept: FAMILY MEDICINE CLINIC | Facility: CLINIC | Age: 54
End: 2019-09-30

## 2019-09-30 NOTE — TELEPHONE ENCOUNTER
----- Message from Delbert Bautista MD sent at 9/29/2019  2:25 PM EDT -----  Call, high blood sugar  Make appt   With Endocriologist

## 2019-10-11 DIAGNOSIS — E11.65 TYPE 2 DIABETES MELLITUS WITH HYPERGLYCEMIA, WITH LONG-TERM CURRENT USE OF INSULIN (HCC): ICD-10-CM

## 2019-10-11 DIAGNOSIS — Z79.4 TYPE 2 DIABETES MELLITUS WITH HYPERGLYCEMIA, WITH LONG-TERM CURRENT USE OF INSULIN (HCC): ICD-10-CM

## 2019-10-11 DIAGNOSIS — E11.42 TYPE 2 DIABETES MELLITUS WITH DIABETIC POLYNEUROPATHY, WITH LONG-TERM CURRENT USE OF INSULIN (HCC): ICD-10-CM

## 2019-10-11 DIAGNOSIS — I10 HYPERTENSION, ESSENTIAL: ICD-10-CM

## 2019-10-11 DIAGNOSIS — K21.9 GASTROESOPHAGEAL REFLUX DISEASE WITHOUT ESOPHAGITIS: Primary | ICD-10-CM

## 2019-10-11 DIAGNOSIS — Z79.4 TYPE 2 DIABETES MELLITUS WITH DIABETIC POLYNEUROPATHY, WITH LONG-TERM CURRENT USE OF INSULIN (HCC): ICD-10-CM

## 2019-10-11 RX ORDER — IRBESARTAN/HYDROCHLOROTHIAZIDE 300-12.5MG
1 TABLET ORAL DAILY
Qty: 90 TABLET | Refills: 1 | Status: SHIPPED | OUTPATIENT
Start: 2019-10-11 | End: 2020-04-08

## 2019-10-11 RX ORDER — OMEPRAZOLE 40 MG/1
40 CAPSULE, DELAYED RELEASE ORAL DAILY
Qty: 90 CAPSULE | Refills: 1 | Status: SHIPPED | OUTPATIENT
Start: 2019-10-11 | End: 2020-06-22

## 2019-10-24 ENCOUNTER — OFFICE VISIT (OUTPATIENT)
Dept: FAMILY MEDICINE CLINIC | Facility: CLINIC | Age: 54
End: 2019-10-24
Payer: COMMERCIAL

## 2019-10-24 VITALS
OXYGEN SATURATION: 95 % | HEART RATE: 80 BPM | SYSTOLIC BLOOD PRESSURE: 136 MMHG | DIASTOLIC BLOOD PRESSURE: 86 MMHG | WEIGHT: 256 LBS | BODY MASS INDEX: 31.83 KG/M2 | HEIGHT: 75 IN | RESPIRATION RATE: 16 BRPM

## 2019-10-24 DIAGNOSIS — K63.5 POLYP OF COLON, UNSPECIFIED PART OF COLON, UNSPECIFIED TYPE: ICD-10-CM

## 2019-10-24 DIAGNOSIS — E11.65 TYPE 2 DIABETES MELLITUS WITH HYPERGLYCEMIA, WITH LONG-TERM CURRENT USE OF INSULIN (HCC): ICD-10-CM

## 2019-10-24 DIAGNOSIS — E78.5 DYSLIPIDEMIA, GOAL LDL BELOW 100: ICD-10-CM

## 2019-10-24 DIAGNOSIS — R74.8 ELEVATED LIVER ENZYMES: Primary | ICD-10-CM

## 2019-10-24 DIAGNOSIS — Z79.4 TYPE 2 DIABETES MELLITUS WITH DIABETIC POLYNEUROPATHY, WITH LONG-TERM CURRENT USE OF INSULIN (HCC): ICD-10-CM

## 2019-10-24 DIAGNOSIS — K21.9 GASTROESOPHAGEAL REFLUX DISEASE WITHOUT ESOPHAGITIS: ICD-10-CM

## 2019-10-24 DIAGNOSIS — Z79.4 TYPE 2 DIABETES MELLITUS WITH HYPERGLYCEMIA, WITH LONG-TERM CURRENT USE OF INSULIN (HCC): ICD-10-CM

## 2019-10-24 DIAGNOSIS — E11.42 TYPE 2 DIABETES MELLITUS WITH DIABETIC POLYNEUROPATHY, WITH LONG-TERM CURRENT USE OF INSULIN (HCC): ICD-10-CM

## 2019-10-24 DIAGNOSIS — I10 HYPERTENSION, ESSENTIAL: ICD-10-CM

## 2019-10-24 DIAGNOSIS — E66.9 OBESITY, CLASS I, BMI 30.0-34.9 (SEE ACTUAL BMI): ICD-10-CM

## 2019-10-24 PROCEDURE — 99214 OFFICE O/P EST MOD 30 MIN: CPT | Performed by: FAMILY MEDICINE

## 2019-10-24 PROCEDURE — 3008F BODY MASS INDEX DOCD: CPT | Performed by: FAMILY MEDICINE

## 2019-10-24 PROCEDURE — 3075F SYST BP GE 130 - 139MM HG: CPT | Performed by: FAMILY MEDICINE

## 2019-10-24 PROCEDURE — 3079F DIAST BP 80-89 MM HG: CPT | Performed by: FAMILY MEDICINE

## 2019-10-24 NOTE — ASSESSMENT & PLAN NOTE
Lab Results   Component Value Date    HGBA1C 11 9 (H) 09/27/2019    Continue Lantus 70 units daily and Apidra 30 mg t i d   Follow-up with Endocrinology

## 2019-10-24 NOTE — PROGRESS NOTES
Assessment/Plan:  Return visit in 4 months  Diagnoses and all orders for this visit:    Elevated liver enzymes  -     US abdomen complete; Future    Hypertension, essential    Dyslipidemia, goal LDL below 100    Gastroesophageal reflux disease without esophagitis    Type 2 diabetes mellitus with hyperglycemia, with long-term current use of insulin ([facility])    Obesity, Class I, BMI 30 0-34 9 (see actual BMI)    Type 2 diabetes mellitus with diabetic polyneuropathy, with long-term current use of insulin ([facility])    Polyp of colon, unspecified part of colon, unspecified type        Gastroesophageal reflux disease without esophagitis  Continue omeprazole 40 mg daily    Obesity, Class I, BMI 30 0-34 9 (see actual BMI)  Weight loss recommended    Hypertension, essential  Continue Avalide 300-12 5 mg and verapamil 360 mg daily    Type 2 diabetes mellitus with hyperglycemia, with long-term current use of insulin ([facility])    Lab Results   Component Value Date    HGBA1C 11 9 (H) 09/27/2019    Continue Lantus 70 units daily and Apidra 30 mg t i d   Follow-up with Endocrinology  Colon polyps  He will do colonoscopy in January when he has more time off work  Subjective:      Patient ID: Joseline Verma is a 62 y o  male  Patient comes in for checkup  He he is getting occasional low blood sugars despite overall having high blood sugars  He is only taking his short-acting insulin once a day due to only having 1 break while he is at work during the day  We do have plans to have him see endocrinologist in the next week or 2  He also is due for colonoscopy  The following portions of the patient's history were reviewed and updated as appropriate:   He has a past medical history of Diabetes mellitus ([address] ), Hyperlipidemia, and Hypertension  ,  does not have any pertinent problems on file  ,   has a past surgical history that includes Foot surgery (Right)  ,  family history includes Cancer in his mother; Hypertension in his father  ,   reports that he has never smoked  He has never used smokeless tobacco  He reports that he drinks about 8 0 standard drinks of alcohol per week  He reports that he does not use drugs  ,  has No Known Allergies     Current Outpatient Medications   Medication Sig Dispense Refill    AVALIDE 300-12 5 MG per tablet Take 1 tablet by mouth daily 90 tablet 1    BD PEN NEEDLE DEV U/F 32G X 4 MM MISC Inject under the skin 4 (four) times a day 400 each 1    Blood Glucose Monitoring Suppl (ONE TOUCH ULTRA 2) w/Device KIT by Does not apply route 2 (two) times a day 1 each 0    glucose blood test strip 1 each by Other route 3 (three) times a day Use as instructed 100 each 2    insulin glargine (LANTUS SOLOSTAR) 100 units/mL injection pen Inject 70 Units under the skin daily 5 pen 5    insulin glulisine (APIDRA SOLOSTAR) 100 units/mL injection pen Inject 30 Units under the skin 3 (three) times a day with meals 15 pen 1    omeprazole (PriLOSEC) 40 MG capsule Take 1 capsule (40 mg total) by mouth daily 90 capsule 1    verapamil (VERELAN PM) 360 MG 24 hr capsule        No current facility-administered medications for this visit  Review of Systems   Constitutional: Negative  Respiratory: Negative  Cardiovascular: Negative  Objective:  Vitals:    10/24/19 1530 10/24/19 1601   BP: 140/88 136/86   Pulse: 80    Resp: 16    SpO2: 95%    Weight: 116 kg (256 lb)    Height: 6' 3" (1 905 m)      Body mass index is 32 kg/m²  Physical Exam   Constitutional: He is oriented to person, place, and time  He appears well-developed  Obese   HENT:   Head: Normocephalic and atraumatic  Right Ear: Tympanic membrane and external ear normal    Left Ear: Tympanic membrane and external ear normal    Eyes: Pupils are equal, round, and reactive to light  EOM are normal    Neck: Neck supple  Cardiovascular: Normal rate, regular rhythm and normal heart sounds     Pulmonary/Chest: Effort normal and breath sounds normal    Abdominal: Soft  Bowel sounds are normal    Musculoskeletal: Normal range of motion  Neurological: He is alert and oriented to person, place, and time  Skin: Skin is warm and dry  Psychiatric: He has a normal mood and affect   Thought content normal

## 2019-11-01 ENCOUNTER — TELEPHONE (OUTPATIENT)
Dept: FAMILY MEDICINE CLINIC | Facility: CLINIC | Age: 54
End: 2019-11-01

## 2019-12-23 ENCOUNTER — TELEPHONE (OUTPATIENT)
Dept: FAMILY MEDICINE CLINIC | Facility: CLINIC | Age: 54
End: 2019-12-23

## 2019-12-23 NOTE — TELEPHONE ENCOUNTER
Pt states he has been out of work for several days d/t the flu  Can he have a work excuse? Start 12/19, he went into work 12/20 but was sent home, sick yesterday and today and return tomorrow  His work is now closed though through [employer]      [phone]

## 2020-01-23 ENCOUNTER — TELEPHONE (OUTPATIENT)
Dept: FAMILY MEDICINE CLINIC | Facility: CLINIC | Age: 55
End: 2020-01-23

## 2020-01-23 NOTE — TELEPHONE ENCOUNTER
Pt called and was home sick with a cold and did not go to work he is asking for a work excuse note for  01/21/2020, 01/22/2020 and 01/23/2020    Is it ok for work note  Please advise  [phone]

## 2020-01-28 ENCOUNTER — OFFICE VISIT (OUTPATIENT)
Dept: FAMILY MEDICINE CLINIC | Facility: CLINIC | Age: 55
End: 2020-01-28
Payer: COMMERCIAL

## 2020-01-28 VITALS
WEIGHT: 251 LBS | SYSTOLIC BLOOD PRESSURE: 132 MMHG | DIASTOLIC BLOOD PRESSURE: 80 MMHG | BODY MASS INDEX: 31.21 KG/M2 | OXYGEN SATURATION: 99 % | TEMPERATURE: 99 F | HEIGHT: 75 IN | RESPIRATION RATE: 16 BRPM | HEART RATE: 102 BPM

## 2020-01-28 DIAGNOSIS — J01.90 ACUTE SINUSITIS, RECURRENCE NOT SPECIFIED, UNSPECIFIED LOCATION: Primary | ICD-10-CM

## 2020-01-28 PROCEDURE — 99213 OFFICE O/P EST LOW 20 MIN: CPT | Performed by: FAMILY MEDICINE

## 2020-01-28 PROCEDURE — 3008F BODY MASS INDEX DOCD: CPT | Performed by: FAMILY MEDICINE

## 2020-01-28 RX ORDER — AMOXICILLIN AND CLAVULANATE POTASSIUM 875; 125 MG/1; MG/1
1 TABLET, FILM COATED ORAL EVERY 12 HOURS SCHEDULED
Qty: 20 TABLET | Refills: 0 | Status: SHIPPED | OUTPATIENT
Start: 2020-01-28 | End: 2020-02-04 | Stop reason: ALTCHOICE

## 2020-01-28 RX ORDER — DEXTROMETHORPHAN HYDROBROMIDE AND PROMETHAZINE HYDROCHLORIDE 15; 6.25 MG/5ML; MG/5ML
5 SOLUTION ORAL 4 TIMES DAILY PRN
Qty: 420 ML | Refills: 0 | Status: SHIPPED | OUTPATIENT
Start: 2020-01-28 | End: 2020-07-06

## 2020-01-28 RX ORDER — FLUTICASONE PROPIONATE 50 MCG
2 SPRAY, SUSPENSION (ML) NASAL DAILY
Qty: 1 BOTTLE | Refills: 1 | Status: ON HOLD | OUTPATIENT
Start: 2020-01-28 | End: 2020-08-04 | Stop reason: ALTCHOICE

## 2020-01-28 NOTE — PROGRESS NOTES
Assessment/Plan:       Diagnoses and all orders for this visit:    Acute sinusitis, recurrence not specified, unspecified location  -     amoxicillin-clavulanate (AUGMENTIN) 875-125 mg per tablet; Take 1 tablet by mouth every 12 (twelve) hours for 10 days  -     fluticasone (FLONASE) 50 mcg/act nasal spray; 2 sprays into each nostril daily  -     Promethazine-DM (PHENERGAN-DM) 6 25-15 mg/5 mL oral syrup; Take 5 mL by mouth 4 (four) times a day as needed for cough        No problem-specific Assessment & Plan notes found for this encounter  BMI Counseling: Body mass index is 31 37 kg/m²  The BMI is above normal  Nutrition recommendations include decreasing portion sizes and moderation in carbohydrate intake  Exercise recommendations include exercising 3-5 times per week  No pharmacotherapy was ordered  Subjective:      Patient ID: Karthik Stringer is a 62 y o  male  Patient comes in with sinus congestion, fever and cough  The following portions of the patient's history were reviewed and updated as appropriate:   He has a past medical history of Diabetes mellitus ([address] ), Hyperlipidemia, and Hypertension  ,  does not have any pertinent problems on file  ,   has a past surgical history that includes Foot surgery (Right)  ,  family history includes Cancer in his mother; Hypertension in his father  ,   reports that he has never smoked  He has never used smokeless tobacco  He reports that he drinks about 8 0 standard drinks of alcohol per week  He reports that he does not use drugs  ,  has No Known Allergies     Current Outpatient Medications   Medication Sig Dispense Refill    amoxicillin-clavulanate (AUGMENTIN) 875-125 mg per tablet Take 1 tablet by mouth every 12 (twelve) hours for 10 days 20 tablet 0    AVALIDE 300-12 5 MG per tablet Take 1 tablet by mouth daily 90 tablet 1    BD PEN NEEDLE DEV U/F 32G X 4 MM MISC Inject under the skin 4 (four) times a day 400 each 1    Blood Glucose Monitoring Suppl (ONE TOUCH ULTRA 2) w/Device KIT by Does not apply route 2 (two) times a day 1 each 0    fluticasone (FLONASE) 50 mcg/act nasal spray 2 sprays into each nostril daily 1 Bottle 1    glucose blood test strip 1 each by Other route 3 (three) times a day Use as instructed 100 each 2    insulin glargine (LANTUS SOLOSTAR) 100 units/mL injection pen Inject 70 Units under the skin daily 5 pen 5    insulin glulisine (APIDRA SOLOSTAR) 100 units/mL injection pen Inject 30 Units under the skin 3 (three) times a day with meals 15 pen 1    omeprazole (PriLOSEC) 40 MG capsule Take 1 capsule (40 mg total) by mouth daily 90 capsule 1    Promethazine-DM (PHENERGAN-DM) 6 25-15 mg/5 mL oral syrup Take 5 mL by mouth 4 (four) times a day as needed for cough 420 mL 0    verapamil (VERELAN PM) 360 MG 24 hr capsule        No current facility-administered medications for this visit  Review of Systems   Constitutional: Positive for fever  HENT: Positive for congestion, postnasal drip and rhinorrhea  Respiratory: Positive for cough  Gastrointestinal: Negative  Objective:  Vitals:    01/28/20 1436   BP: 132/80   Pulse: 102   Resp: 16   Temp: 99 °F (37 2 °C)   SpO2: 99%   Weight: 114 kg (251 lb)   Height: 6' 3" (1 905 m)     Body mass index is 31 37 kg/m²  Physical Exam   Constitutional: He is oriented to person, place, and time  He appears well-developed and well-nourished  HENT:   Head: Normocephalic and atraumatic  Right Ear: Tympanic membrane and external ear normal    Left Ear: Tympanic membrane and external ear normal    Paranasal sinuses are red  Posterior oropharynx is injected   Eyes: Pupils are equal, round, and reactive to light  EOM are normal    Neck: Neck supple  Cardiovascular: Normal rate, regular rhythm and normal heart sounds  Pulmonary/Chest: Effort normal and breath sounds normal    Musculoskeletal: Normal range of motion     Neurological: He is alert and oriented to person, place, and time  Skin: Skin is warm and dry  Psychiatric: He has a normal mood and affect   Thought content normal

## 2020-01-28 NOTE — PATIENT INSTRUCTIONS
Cold Symptoms   AMBULATORY CARE:   Cold symptoms  include sneezing, dry throat, a stuffy nose, headache, watery eyes, and a cough  Your cough may be dry, or you may cough up mucus  You may also have muscle aches, joint pain, and tiredness  Rarely, you may have a fever  Cold symptoms occur from inflammation in your upper respiratory system caused by a virus  Most colds go away without treatment  Seek care immediately if:   · You have increased tiredness and weakness  · You are unable to eat  · Your heart is beating much faster than usual for you  · You see white spots in the back of your throat and your neck is swollen and sore to the touch  · You see pinpoint or larger reddish-purple dots on your skin  Contact your healthcare provider if:   · You have a fever higher than 102°F (38 9°C)  · You have new or worsening shortness of breath  · You have thick nasal drainage for more than 2 days  · Your symptoms do not improve or get worse within 5 days  · You have questions or concerns about your condition or care  Treatment for cold symptoms  may include NSAIDS to decrease muscle aches and fever  Cold medicines may also be given to decrease coughing, nasal stuffiness, sneezing, and a runny nose  Manage your cold symptoms: The following may help relieve cold symptoms, such as a dry throat and congestion:  · Gargle with mouthwash or warm salt water as directed  · Suck on throat lozenges or hard candy  · Use a cold or warm vaporizer or humidifier to ease your breathing  · Rest for at least 2 days and then as needed to decrease tiredness and weakness  · Use petroleum based jelly around your nostrils to decrease irritation from blowing your nose  · Drink plenty of liquids  Liquids will help thin and loosen thick mucus so you can cough it up  Liquids will also keep you hydrated   Ask your healthcare provider which liquids are best for you and how much to drink each day   Prevent the spread of germs  by washing your hands often  You can spread your cold germs to others for at least 3 days after your symptoms start  Do not share items, such as eating utensils  Cover your nose and mouth when you cough or sneeze using the crook of your elbow instead of your hands  Throw used tissues in the garbage  Do not smoke:  Smoking may worsen your symptoms and increase the length of time you feel sick  Talk with your healthcare provider if you need help to stop smoking  Follow up with your healthcare provider as directed:  Write down your questions so you remember to ask them during your visits  © 2017 [facility] Information is for End User's use only and may not be sold, redistributed or otherwise used for commercial purposes  All illustrations and images included in CareNotes® are the copyrighted property of [employer]  or Richy Murillo  The above information is an  only  It is not intended as medical advice for individual conditions or treatments  Talk to your doctor, nurse or pharmacist before following any medical regimen to see if it is safe and effective for you

## 2020-02-03 ENCOUNTER — TELEPHONE (OUTPATIENT)
Dept: FAMILY MEDICINE CLINIC | Facility: CLINIC | Age: 55
End: 2020-02-03

## 2020-02-03 DIAGNOSIS — Z79.4 TYPE 2 DIABETES MELLITUS WITH DIABETIC POLYNEUROPATHY, WITH LONG-TERM CURRENT USE OF INSULIN (HCC): ICD-10-CM

## 2020-02-03 DIAGNOSIS — E11.42 TYPE 2 DIABETES MELLITUS WITH DIABETIC POLYNEUROPATHY, WITH LONG-TERM CURRENT USE OF INSULIN (HCC): ICD-10-CM

## 2020-02-04 ENCOUNTER — OFFICE VISIT (OUTPATIENT)
Dept: FAMILY MEDICINE CLINIC | Facility: CLINIC | Age: 55
End: 2020-02-04
Payer: COMMERCIAL

## 2020-02-04 VITALS
BODY MASS INDEX: 30.5 KG/M2 | TEMPERATURE: 98.4 F | WEIGHT: 244 LBS | HEART RATE: 105 BPM | SYSTOLIC BLOOD PRESSURE: 98 MMHG | DIASTOLIC BLOOD PRESSURE: 60 MMHG | OXYGEN SATURATION: 97 %

## 2020-02-04 DIAGNOSIS — E11.42 TYPE 2 DIABETES MELLITUS WITH DIABETIC POLYNEUROPATHY, WITH LONG-TERM CURRENT USE OF INSULIN (HCC): Primary | ICD-10-CM

## 2020-02-04 DIAGNOSIS — E66.9 OBESITY, CLASS I, BMI 30.0-34.9 (SEE ACTUAL BMI): ICD-10-CM

## 2020-02-04 DIAGNOSIS — Z79.4 TYPE 2 DIABETES MELLITUS WITH HYPERGLYCEMIA, WITH LONG-TERM CURRENT USE OF INSULIN (HCC): ICD-10-CM

## 2020-02-04 DIAGNOSIS — Z91.19 HISTORY OF NONADHERENCE TO MEDICAL TREATMENT: ICD-10-CM

## 2020-02-04 DIAGNOSIS — Z79.4 TYPE 2 DIABETES MELLITUS WITH DIABETIC POLYNEUROPATHY, WITH LONG-TERM CURRENT USE OF INSULIN (HCC): Primary | ICD-10-CM

## 2020-02-04 DIAGNOSIS — E11.65 TYPE 2 DIABETES MELLITUS WITH HYPERGLYCEMIA, WITH LONG-TERM CURRENT USE OF INSULIN (HCC): ICD-10-CM

## 2020-02-04 PROCEDURE — 3078F DIAST BP <80 MM HG: CPT | Performed by: PHYSICIAN ASSISTANT

## 2020-02-04 PROCEDURE — 1036F TOBACCO NON-USER: CPT | Performed by: PHYSICIAN ASSISTANT

## 2020-02-04 PROCEDURE — 3074F SYST BP LT 130 MM HG: CPT | Performed by: PHYSICIAN ASSISTANT

## 2020-02-04 PROCEDURE — 99213 OFFICE O/P EST LOW 20 MIN: CPT | Performed by: PHYSICIAN ASSISTANT

## 2020-02-04 NOTE — PROGRESS NOTES
Assessment/Plan:          Diagnoses and all orders for this visit:    Type 2 diabetes mellitus with diabetic polyneuropathy, with long-term current use of insulin ([facility])  -     glucose blood test strip; Check blood sugar daily  -     insulin glulisine (APIDRA SOLOSTAR) 100 units/mL injection pen; Inject 35 Units under the skin 3 (three) times a day with meals  -     Comprehensive metabolic panel; Future  -     HEMOGLOBIN A1C W/ EAG ESTIMATION; Future  -     TSH, 3rd generation with Free T4 reflex; Future  -     Microalbumin / creatinine urine ratio    Type 2 diabetes mellitus with hyperglycemia, with long-term current use of insulin ([facility])  -     insulin glargine (LANTUS SOLOSTAR) 100 units/mL injection pen; Inject 80 Units under the skin daily  -     Comprehensive metabolic panel; Future  -     HEMOGLOBIN A1C W/ EAG ESTIMATION; Future  -     TSH, 3rd generation with Free T4 reflex; Future  -     Microalbumin / creatinine urine ratio    Obesity, Class I, BMI 30 0-34 9 (see actual BMI)  -     Comprehensive metabolic panel; Future  -     HEMOGLOBIN A1C W/ EAG ESTIMATION; Future  -     TSH, 3rd generation with Free T4 reflex; Future  -     Microalbumin / creatinine urine ratio    History of nonadherence to medical treatment        Patient is cautioned that if his blood sugar goes up to 400 or over again he needs to go to the emergency room    Patient is to follow-up with endocrinologist as scheduled  Subjective:      Patient ID: Karl Brown is a 62 y o  male  Diabetes   He presents for his follow-up diabetic visit  He has type 2 diabetes mellitus  No MedicAlert identification noted  His disease course has been worsening  Hypoglycemia symptoms include dizziness and sweats  Pertinent negatives for hypoglycemia include no pallor, seizures, speech difficulty or tremors  Associated symptoms include fatigue and foot paresthesias  Pertinent negatives for diabetes include no chest pain and no foot ulcerations   There are no hypoglycemic complications  Symptoms are worsening  Diabetic complications include peripheral neuropathy  Risk factors for coronary artery disease include diabetes mellitus, dyslipidemia, hypertension, male sex and obesity  Current diabetic treatment includes insulin injections  He is compliant with treatment most of the time  His weight is decreasing rapidly  He is following a generally healthy diet  When asked about meal planning, he reported none  His home blood glucose trend is increasing steadily  His breakfast blood glucose is taken between 8-9 am  His breakfast blood glucose range is generally >200 mg/dl  His dinner blood glucose is taken between 6-7 pm  His dinner blood glucose range is generally >200 mg/dl  An ACE inhibitor/angiotensin II receptor blocker is not being taken  Patient states some of his blood sugars have been up into the 400 to 600 range  Before he came today it was 310  The following portions of the patient's history were reviewed and updated as appropriate:   He has a past medical history of Diabetes mellitus ([address] ), Hyperlipidemia, and Hypertension  ,  does not have any pertinent problems on file  ,   has a past surgical history that includes Foot surgery (Right)  ,  family history includes Cancer in his mother; Hypertension in his father  ,   reports that he has never smoked  He has never used smokeless tobacco  He reports that he drinks about 8 0 standard drinks of alcohol per week  He reports that he does not use drugs  ,  has No Known Allergies     Current Outpatient Medications   Medication Sig Dispense Refill    AVALIDE 300-12 5 MG per tablet Take 1 tablet by mouth daily 90 tablet 1    BD PEN NEEDLE DEV U/F 32G X 4 MM MISC Inject under the skin 4 (four) times a day 400 each 1    Blood Glucose Monitoring Suppl (ONE TOUCH ULTRA 2) w/Device KIT by Does not apply route 2 (two) times a day 1 each 0    fluticasone (FLONASE) 50 mcg/act nasal spray 2 sprays into each nostril daily 1 Bottle 1    glucose blood test strip Check blood sugar daily 100 each 3    insulin glargine (LANTUS SOLOSTAR) 100 units/mL injection pen Inject 80 Units under the skin daily 5 pen 5    insulin glulisine (APIDRA SOLOSTAR) 100 units/mL injection pen Inject 35 Units under the skin 3 (three) times a day with meals 15 pen 1    omeprazole (PriLOSEC) 40 MG capsule Take 1 capsule (40 mg total) by mouth daily 90 capsule 1    Promethazine-DM (PHENERGAN-DM) 6 25-15 mg/5 mL oral syrup Take 5 mL by mouth 4 (four) times a day as needed for cough 420 mL 0    verapamil (VERELAN PM) 360 MG 24 hr capsule        No current facility-administered medications for this visit  Review of Systems   Constitutional: Positive for activity change, appetite change and fatigue  Respiratory: Negative for chest tightness and shortness of breath  Cardiovascular: Negative for chest pain and palpitations  Gastrointestinal: Positive for nausea  Skin: Negative for pallor  Neurological: Positive for dizziness and numbness  Negative for tremors, seizures and speech difficulty  Objective:  Vitals:    02/04/20 1430   BP: 98/60   Pulse: 105   Temp: 98 4 °F (36 9 °C)   SpO2: 97%   Weight: 111 kg (244 lb)     Body mass index is 30 5 kg/m²  Physical Exam   Constitutional: He is oriented to person, place, and time  He appears distressed  HENT:   Head: Normocephalic  Right Ear: External ear normal    Left Ear: External ear normal    Eyes: Conjunctivae are normal    Neck: Normal range of motion  Cardiovascular: Normal rate, regular rhythm and normal heart sounds  Pulmonary/Chest: Effort normal and breath sounds normal    Neurological: He is alert and oriented to person, place, and time  Skin: Skin is warm and dry  Psychiatric: He has a normal mood and affect  His behavior is normal    Nursing note and vitals reviewed

## 2020-02-06 ENCOUNTER — LAB (OUTPATIENT)
Dept: LAB | Facility: HOSPITAL | Age: 55
End: 2020-02-06
Payer: COMMERCIAL

## 2020-02-06 DIAGNOSIS — E66.9 OBESITY, CLASS I, BMI 30.0-34.9 (SEE ACTUAL BMI): ICD-10-CM

## 2020-02-06 DIAGNOSIS — E11.42 TYPE 2 DIABETES MELLITUS WITH DIABETIC POLYNEUROPATHY, WITH LONG-TERM CURRENT USE OF INSULIN (HCC): ICD-10-CM

## 2020-02-06 DIAGNOSIS — Z79.4 TYPE 2 DIABETES MELLITUS WITH HYPERGLYCEMIA, WITH LONG-TERM CURRENT USE OF INSULIN (HCC): ICD-10-CM

## 2020-02-06 DIAGNOSIS — E11.65 TYPE 2 DIABETES MELLITUS WITH HYPERGLYCEMIA, WITH LONG-TERM CURRENT USE OF INSULIN (HCC): ICD-10-CM

## 2020-02-06 DIAGNOSIS — Z79.4 TYPE 2 DIABETES MELLITUS WITH DIABETIC POLYNEUROPATHY, WITH LONG-TERM CURRENT USE OF INSULIN (HCC): ICD-10-CM

## 2020-02-06 LAB
ALBUMIN SERPL BCP-MCNC: 3.7 G/DL (ref 3.5–5)
ALP SERPL-CCNC: 110 U/L (ref 46–116)
ALT SERPL W P-5'-P-CCNC: 123 U/L (ref 12–78)
ANION GAP SERPL CALCULATED.3IONS-SCNC: 11 MMOL/L (ref 4–13)
AST SERPL W P-5'-P-CCNC: 71 U/L (ref 5–45)
BILIRUB SERPL-MCNC: 0.6 MG/DL (ref 0.2–1)
BUN SERPL-MCNC: 21 MG/DL (ref 5–25)
CALCIUM SERPL-MCNC: 9.5 MG/DL (ref 8.3–10.1)
CHLORIDE SERPL-SCNC: 97 MMOL/L (ref 100–108)
CO2 SERPL-SCNC: 25 MMOL/L (ref 21–32)
CREAT SERPL-MCNC: 0.85 MG/DL (ref 0.6–1.3)
CREAT UR-MCNC: 66.5 MG/DL
EST. AVERAGE GLUCOSE BLD GHB EST-MCNC: 275 MG/DL
GFR SERPL CREATININE-BSD FRML MDRD: 97 ML/MIN/1.73SQ M
GLUCOSE SERPL-MCNC: 263 MG/DL (ref 65–140)
HBA1C MFR BLD: 11.2 % (ref 4.2–6.3)
MICROALBUMIN UR-MCNC: 157 MG/L (ref 0–20)
MICROALBUMIN/CREAT 24H UR: 236 MG/G CREATININE (ref 0–30)
POTASSIUM SERPL-SCNC: 4.9 MMOL/L (ref 3.5–5.3)
PROT SERPL-MCNC: 8.2 G/DL (ref 6.4–8.2)
SODIUM SERPL-SCNC: 133 MMOL/L (ref 136–145)
T4 FREE SERPL-MCNC: 1.52 NG/DL (ref 0.76–1.46)
TSH SERPL DL<=0.05 MIU/L-ACNC: 0.01 UIU/ML (ref 0.36–3.74)

## 2020-02-06 PROCEDURE — 84439 ASSAY OF FREE THYROXINE: CPT

## 2020-02-06 PROCEDURE — 83036 HEMOGLOBIN GLYCOSYLATED A1C: CPT

## 2020-02-06 PROCEDURE — 82043 UR ALBUMIN QUANTITATIVE: CPT | Performed by: PHYSICIAN ASSISTANT

## 2020-02-06 PROCEDURE — 82570 ASSAY OF URINE CREATININE: CPT | Performed by: PHYSICIAN ASSISTANT

## 2020-02-06 PROCEDURE — 36415 COLL VENOUS BLD VENIPUNCTURE: CPT

## 2020-02-06 PROCEDURE — 3060F POS MICROALBUMINURIA REV: CPT | Performed by: FAMILY MEDICINE

## 2020-02-06 PROCEDURE — 80053 COMPREHEN METABOLIC PANEL: CPT

## 2020-02-06 PROCEDURE — 84443 ASSAY THYROID STIM HORMONE: CPT

## 2020-02-11 ENCOUNTER — TELEPHONE (OUTPATIENT)
Dept: FAMILY MEDICINE CLINIC | Facility: CLINIC | Age: 55
End: 2020-02-11

## 2020-02-11 NOTE — TELEPHONE ENCOUNTER
----- Message from Dinh Goldman PA-C sent at 2/11/2020  9:50 AM EST -----  Please call patient and ask him for dates for his FMLA    I needed date of start and ending

## 2020-02-11 NOTE — TELEPHONE ENCOUNTER
Start date Feb 3rd  -End date to be determined - going to Endo tomorrow and will see what type of med he will put him on - he said he can have the Endo give him the end date

## 2020-02-16 DIAGNOSIS — Z79.4 TYPE 2 DIABETES MELLITUS WITH HYPERGLYCEMIA, WITH LONG-TERM CURRENT USE OF INSULIN (HCC): ICD-10-CM

## 2020-02-16 DIAGNOSIS — E11.65 TYPE 2 DIABETES MELLITUS WITH HYPERGLYCEMIA, WITH LONG-TERM CURRENT USE OF INSULIN (HCC): ICD-10-CM

## 2020-02-16 RX ORDER — INSULIN GLARGINE 100 [IU]/ML
INJECTION, SOLUTION SUBCUTANEOUS
Qty: 45 ML | Refills: 5 | Status: SHIPPED | OUTPATIENT
Start: 2020-02-16 | End: 2020-03-06

## 2020-02-17 ENCOUNTER — TRANSCRIBE ORDERS (OUTPATIENT)
Dept: ADMINISTRATIVE | Facility: HOSPITAL | Age: 55
End: 2020-02-17

## 2020-02-17 ENCOUNTER — TELEPHONE (OUTPATIENT)
Dept: FAMILY MEDICINE CLINIC | Facility: CLINIC | Age: 55
End: 2020-02-17

## 2020-02-17 DIAGNOSIS — E05.90 PRIMARY HYPERTHYROIDISM: Primary | ICD-10-CM

## 2020-02-17 NOTE — TELEPHONE ENCOUNTER
Called patient to ask when he is going back to work for his FMLA forms , he said -"TO BE DETERMINED "   He asked when he needs to come back to see [facility] and she suggested the 1 st week of March - the only available is 3/6/20 @ 9:30 A  M  -he is currently in testing - will call back later on today

## 2020-02-19 ENCOUNTER — HOSPITAL ENCOUNTER (OUTPATIENT)
Dept: ULTRASOUND IMAGING | Facility: CLINIC | Age: 55
Discharge: HOME | End: 2020-02-19
Payer: COMMERCIAL

## 2020-02-19 DIAGNOSIS — E05.90 PRIMARY HYPERTHYROIDISM: ICD-10-CM

## 2020-02-19 PROCEDURE — 76536 US EXAM OF HEAD AND NECK: CPT

## 2020-02-20 ENCOUNTER — TRANSCRIBE ORDERS (OUTPATIENT)
Dept: ADMINISTRATIVE | Facility: HOSPITAL | Age: 55
End: 2020-02-20

## 2020-02-20 DIAGNOSIS — Z13.9 SCREENING FOR UNSPECIFIED CONDITION: ICD-10-CM

## 2020-02-20 DIAGNOSIS — E05.40 THYROTOXICOSIS FACTITIA WITHOUT THYROID STORM: ICD-10-CM

## 2020-02-20 DIAGNOSIS — E11.649 UNCONTROLLED TYPE 2 DIABETES MELLITUS WITH HYPOGLYCEMIA, UNSPECIFIED HYPOGLYCEMIA COMA STATUS (HCC): Primary | ICD-10-CM

## 2020-02-20 DIAGNOSIS — E78.5 HYPERLIPIDEMIA, UNSPECIFIED HYPERLIPIDEMIA TYPE: ICD-10-CM

## 2020-02-21 ENCOUNTER — APPOINTMENT (OUTPATIENT)
Dept: LAB | Facility: HOSPITAL | Age: 55
End: 2020-02-21
Payer: COMMERCIAL

## 2020-02-27 ENCOUNTER — TRANSCRIBE ORDERS (OUTPATIENT)
Dept: ADMINISTRATIVE | Facility: HOSPITAL | Age: 55
End: 2020-02-27

## 2020-02-27 DIAGNOSIS — E05.90 PRETIBIAL MYXEDEMA: Primary | ICD-10-CM

## 2020-03-04 ENCOUNTER — HOSPITAL ENCOUNTER (OUTPATIENT)
Dept: NUCLEAR MEDICINE | Facility: HOSPITAL | Age: 55
Discharge: HOME | End: 2020-03-04
Payer: COMMERCIAL

## 2020-03-04 DIAGNOSIS — E05.90 PRETIBIAL MYXEDEMA: ICD-10-CM

## 2020-03-04 DIAGNOSIS — E05.90 HYPERTHYROIDISM: ICD-10-CM

## 2020-03-04 PROCEDURE — A9516 IODINE I-123 SOD IODIDE MIC: HCPCS

## 2020-03-04 PROCEDURE — 78014 THYROID IMAGING W/BLOOD FLOW: CPT

## 2020-03-05 ENCOUNTER — HOSPITAL ENCOUNTER (OUTPATIENT)
Dept: NUCLEAR MEDICINE | Facility: HOSPITAL | Age: 55
Discharge: HOME | End: 2020-03-05
Payer: COMMERCIAL

## 2020-03-05 RX ORDER — METFORMIN HYDROCHLORIDE 500 MG/1
TABLET, EXTENDED RELEASE ORAL
COMMUNITY
Start: 2020-02-17 | End: 2020-07-06 | Stop reason: ALTCHOICE

## 2020-03-06 ENCOUNTER — TELEPHONE (OUTPATIENT)
Dept: OTHER | Facility: OTHER | Age: 55
End: 2020-03-06

## 2020-03-06 ENCOUNTER — OFFICE VISIT (OUTPATIENT)
Dept: FAMILY MEDICINE CLINIC | Facility: CLINIC | Age: 55
End: 2020-03-06
Payer: COMMERCIAL

## 2020-03-06 VITALS
DIASTOLIC BLOOD PRESSURE: 84 MMHG | HEART RATE: 89 BPM | OXYGEN SATURATION: 99 % | SYSTOLIC BLOOD PRESSURE: 124 MMHG | HEIGHT: 75 IN | BODY MASS INDEX: 31.08 KG/M2 | WEIGHT: 250 LBS | TEMPERATURE: 97.9 F

## 2020-03-06 DIAGNOSIS — E78.5 DYSLIPIDEMIA, GOAL LDL BELOW 100: ICD-10-CM

## 2020-03-06 DIAGNOSIS — E04.2 MULTIPLE THYROID NODULES: ICD-10-CM

## 2020-03-06 DIAGNOSIS — Z79.4 TYPE 2 DIABETES MELLITUS WITH HYPERGLYCEMIA, WITH LONG-TERM CURRENT USE OF INSULIN (HCC): ICD-10-CM

## 2020-03-06 DIAGNOSIS — Z79.4 TYPE 2 DIABETES MELLITUS WITH DIABETIC POLYNEUROPATHY, WITH LONG-TERM CURRENT USE OF INSULIN (HCC): Primary | ICD-10-CM

## 2020-03-06 DIAGNOSIS — E11.65 TYPE 2 DIABETES MELLITUS WITH HYPERGLYCEMIA, WITH LONG-TERM CURRENT USE OF INSULIN (HCC): ICD-10-CM

## 2020-03-06 DIAGNOSIS — E11.42 TYPE 2 DIABETES MELLITUS WITH DIABETIC POLYNEUROPATHY, WITH LONG-TERM CURRENT USE OF INSULIN (HCC): Primary | ICD-10-CM

## 2020-03-06 PROCEDURE — 1036F TOBACCO NON-USER: CPT | Performed by: PHYSICIAN ASSISTANT

## 2020-03-06 PROCEDURE — 3074F SYST BP LT 130 MM HG: CPT | Performed by: PHYSICIAN ASSISTANT

## 2020-03-06 PROCEDURE — 3008F BODY MASS INDEX DOCD: CPT | Performed by: PHYSICIAN ASSISTANT

## 2020-03-06 PROCEDURE — 3046F HEMOGLOBIN A1C LEVEL >9.0%: CPT | Performed by: PHYSICIAN ASSISTANT

## 2020-03-06 PROCEDURE — 99213 OFFICE O/P EST LOW 20 MIN: CPT | Performed by: PHYSICIAN ASSISTANT

## 2020-03-06 PROCEDURE — 3079F DIAST BP 80-89 MM HG: CPT | Performed by: PHYSICIAN ASSISTANT

## 2020-03-06 PROCEDURE — 3060F POS MICROALBUMINURIA REV: CPT | Performed by: PHYSICIAN ASSISTANT

## 2020-03-06 NOTE — TELEPHONE ENCOUNTER
Pt is calling to inform the office paperwork will be arriving via fax to be filled out and returned for his short term disability

## 2020-03-06 NOTE — PROGRESS NOTES
Assessment/Plan:          Diagnoses and all orders for this visit:    Type 2 diabetes mellitus with diabetic polyneuropathy, with long-term current use of insulin ([facility])  -     insulin glulisine (Apidra SoloStar) 100 units/mL injection pen; Inject 40 Units under the skin 3 (three) times a day with meals    Multiple thyroid nodules    Type 2 diabetes mellitus with hyperglycemia, with long-term current use of insulin ([facility])  -     insulin glargine (Lantus SoloStar) 100 units/mL injection pen; 40 u in morning and 80 u bedtime    Dyslipidemia, goal LDL below 100    Other orders  -     metFORMIN (GLUCOPHAGE-XR) 500 mg 24 hr tablet        Patient is to continue his follow-up with the endocrinologist   He has an upcoming appointment with Dr Barbara Dotson in April  Subjective:      Patient ID: Abhishek Bran is a 62 y o  male  Patient is here for follow-up of his diabetes, high blood pressure, and fatigue  His blood sugars are under better control with the increase in insulin from the endocrinologist   He states his morning sugars are usually from 100-130  His afternoon sugars are usually in the low 200s  He states he has not had any near 300 in approximately 3 weeks  He does state he thought his fatigue would get better but it has not  He also has thyroid nodules which need to be biopsied  That workup is being done through the endocrinologist as well  The following portions of the patient's history were reviewed and updated as appropriate:   He has a past medical history of Diabetes mellitus ([address] ), Hyperlipidemia, and Hypertension  ,  does not have any pertinent problems on file  ,   has a past surgical history that includes Foot surgery (Right)  ,  family history includes Cancer in his mother; Hypertension in his father  ,   reports that he has never smoked  He has never used smokeless tobacco  He reports that he drinks about 8 0 standard drinks of alcohol per week  He reports that he does not use drugs  ,  has No Known Allergies     Current Outpatient Medications   Medication Sig Dispense Refill    AVALIDE 300-12 5 MG per tablet Take 1 tablet by mouth daily 90 tablet 1    BD PEN NEEDLE DEV U/F 32G X 4 MM MISC Inject under the skin 4 (four) times a day 400 each 1    Blood Glucose Monitoring Suppl (ONE TOUCH ULTRA 2) w/Device KIT by Does not apply route 2 (two) times a day 1 each 0    fluticasone (FLONASE) 50 mcg/act nasal spray 2 sprays into each nostril daily 1 Bottle 1    glucose blood test strip Check blood sugar daily 100 each 3    insulin glargine (Lantus SoloStar) 100 units/mL injection pen 40 u in morning and 80 u bedtime 45 mL 5    insulin glulisine (Apidra SoloStar) 100 units/mL injection pen Inject 40 Units under the skin 3 (three) times a day with meals 15 pen 1    metFORMIN (GLUCOPHAGE-XR) 500 mg 24 hr tablet       omeprazole (PriLOSEC) 40 MG capsule Take 1 capsule (40 mg total) by mouth daily 90 capsule 1    verapamil (VERELAN PM) 360 MG 24 hr capsule       Promethazine-DM (PHENERGAN-DM) 6 25-15 mg/5 mL oral syrup Take 5 mL by mouth 4 (four) times a day as needed for cough 420 mL 0     No current facility-administered medications for this visit  Review of Systems   Constitutional: Positive for activity change and fatigue  Negative for appetite change, chills, diaphoresis and fever  HENT: Positive for postnasal drip and trouble swallowing  Negative for sore throat  Eyes: Negative for pain  Respiratory: Positive for cough  Negative for choking, chest tightness and shortness of breath  Cardiovascular: Negative for chest pain, palpitations and leg swelling  Gastrointestinal: Negative for abdominal pain, diarrhea and nausea  Genitourinary: Negative for dysuria  Musculoskeletal: Negative for back pain, gait problem and neck pain  Skin: Negative for color change and rash  Neurological: Positive for numbness  Negative for dizziness, syncope, light-headedness and headaches  Objective:  Vitals:    03/06/20 0906   BP: 124/84   Pulse: 89   Temp: 97 9 °F (36 6 °C)   SpO2: 99%   Weight: 113 kg (250 lb)   Height: 6' 3" (1 905 m)     Body mass index is 31 25 kg/m²  Physical Exam   Constitutional: He is oriented to person, place, and time  He appears well-developed and well-nourished  HENT:   Head: Normocephalic  Right Ear: External ear normal    Left Ear: External ear normal    Mouth/Throat: Oropharynx is clear and moist    Eyes: Conjunctivae are normal    Neck: Normal range of motion  Carotid bruit is not present  Thyromegaly present  Cardiovascular: Normal rate, regular rhythm, normal heart sounds and intact distal pulses  Pulmonary/Chest: Effort normal and breath sounds normal    Musculoskeletal: Normal range of motion  Neurological: He is alert and oriented to person, place, and time  Skin: Skin is warm and dry  Psychiatric: He has a normal mood and affect  His behavior is normal    Nursing note and vitals reviewed

## 2020-03-19 ENCOUNTER — OFFICE VISIT (OUTPATIENT)
Dept: FAMILY MEDICINE CLINIC | Facility: CLINIC | Age: 55
End: 2020-03-19
Payer: COMMERCIAL

## 2020-03-19 ENCOUNTER — TRANSCRIBE ORDERS (OUTPATIENT)
Dept: ADMINISTRATIVE | Facility: HOSPITAL | Age: 55
End: 2020-03-19

## 2020-03-19 VITALS
HEIGHT: 75 IN | BODY MASS INDEX: 30.59 KG/M2 | WEIGHT: 246 LBS | DIASTOLIC BLOOD PRESSURE: 84 MMHG | HEART RATE: 92 BPM | OXYGEN SATURATION: 98 % | TEMPERATURE: 98.2 F | SYSTOLIC BLOOD PRESSURE: 118 MMHG

## 2020-03-19 DIAGNOSIS — E04.2 MULTIPLE THYROID NODULES: ICD-10-CM

## 2020-03-19 DIAGNOSIS — E11.65 TYPE 2 DIABETES MELLITUS WITH HYPERGLYCEMIA, WITH LONG-TERM CURRENT USE OF INSULIN (HCC): ICD-10-CM

## 2020-03-19 DIAGNOSIS — Z79.4 TYPE 2 DIABETES MELLITUS WITH DIABETIC POLYNEUROPATHY, WITH LONG-TERM CURRENT USE OF INSULIN (HCC): Primary | ICD-10-CM

## 2020-03-19 DIAGNOSIS — E11.42 TYPE 2 DIABETES MELLITUS WITH DIABETIC POLYNEUROPATHY, WITH LONG-TERM CURRENT USE OF INSULIN (HCC): Primary | ICD-10-CM

## 2020-03-19 DIAGNOSIS — Z79.4 TYPE 2 DIABETES MELLITUS WITH HYPERGLYCEMIA, WITH LONG-TERM CURRENT USE OF INSULIN (HCC): ICD-10-CM

## 2020-03-19 DIAGNOSIS — E05.20 TOXIC MULTINODUL GOITER: Primary | ICD-10-CM

## 2020-03-19 PROCEDURE — 3074F SYST BP LT 130 MM HG: CPT | Performed by: PHYSICIAN ASSISTANT

## 2020-03-19 PROCEDURE — 3079F DIAST BP 80-89 MM HG: CPT | Performed by: PHYSICIAN ASSISTANT

## 2020-03-19 PROCEDURE — 1036F TOBACCO NON-USER: CPT | Performed by: PHYSICIAN ASSISTANT

## 2020-03-19 PROCEDURE — 3046F HEMOGLOBIN A1C LEVEL >9.0%: CPT | Performed by: PHYSICIAN ASSISTANT

## 2020-03-19 PROCEDURE — 3008F BODY MASS INDEX DOCD: CPT | Performed by: PHYSICIAN ASSISTANT

## 2020-03-19 PROCEDURE — 99213 OFFICE O/P EST LOW 20 MIN: CPT | Performed by: PHYSICIAN ASSISTANT

## 2020-03-19 PROCEDURE — 3060F POS MICROALBUMINURIA REV: CPT | Performed by: PHYSICIAN ASSISTANT

## 2020-03-19 RX ORDER — METHIMAZOLE 5 MG/1
TABLET ORAL
COMMUNITY
Start: 2020-03-05 | End: 2020-07-06 | Stop reason: ALTCHOICE

## 2020-03-19 NOTE — PROGRESS NOTES
Assessment/Plan:          Diagnoses and all orders for this visit:    Type 2 diabetes mellitus with diabetic polyneuropathy, with long-term current use of insulin ([facility] [address] )    Type 2 diabetes mellitus with hyperglycemia, with long-term current use of insulin ([facility])  -     insulin glargine (Lantus SoloStar) 100 units/mL injection pen; 80 u bedtime    Multiple thyroid nodules    Other orders  -     methimazole (TAPAZOLE) 5 mg tablet; take 1 tablet by mouth once daily -STOP MED AND CALL DOCTOR IF SORE THROAT AND FEVER OCCUR        Patient is cleared to go back to work without restrictions  He will follow-up for the thyroid biopsy on April 23rd  Subjective:      Patient ID: Palomo Liu is a 60 y o  male  Patient is here to discuss several things about his current conditions  He is going to need biopsies of the nodules in his thyroid  This cannot be done until late April  He has started the Tapazole and is not having any problems with it so far  He needed clarification about the medication and if it was for hypothyroidism or hyperthyroidism  His sugars have been doing much better now  The endocrinologist started him on metformin twice a day and stopped his Lantus in the morning  His sugars have been between 80 and 120 most of the time  Since patient is feeling much better he would like to try to go back to work  The following portions of the patient's history were reviewed and updated as appropriate:   He has a past medical history of Diabetes mellitus ([facility] [address] ), Hyperlipidemia, and Hypertension  ,  does not have any pertinent problems on file  ,   has a past surgical history that includes Foot surgery (Right)  ,  family history includes Cancer in his mother; Hypertension in his father  ,   reports that he has never smoked  He has never used smokeless tobacco  He reports that he drinks about 8 0 standard drinks of alcohol per week  He reports that he does not use drugs  ,  has No Known Allergies     Current Outpatient Medications   Medication Sig Dispense Refill    AVALIDE 300-12 5 MG per tablet Take 1 tablet by mouth daily 90 tablet 1    BD PEN NEEDLE DEV U/F 32G X 4 MM MISC Inject under the skin 4 (four) times a day 400 each 1    Blood Glucose Monitoring Suppl (ONE TOUCH ULTRA 2) w/Device KIT by Does not apply route 2 (two) times a day 1 each 0    fluticasone (FLONASE) 50 mcg/act nasal spray 2 sprays into each nostril daily 1 Bottle 1    glucose blood test strip Check blood sugar daily 100 each 3    insulin glargine (Lantus SoloStar) 100 units/mL injection pen 80 u bedtime 45 mL 5    insulin glulisine (Apidra SoloStar) 100 units/mL injection pen Inject 40 Units under the skin 3 (three) times a day with meals 15 pen 1    metFORMIN (GLUCOPHAGE-XR) 500 mg 24 hr tablet       methimazole (TAPAZOLE) 5 mg tablet take 1 tablet by mouth once daily -STOP MED AND CALL DOCTOR IF SORE THROAT AND FEVER OCCUR      omeprazole (PriLOSEC) 40 MG capsule Take 1 capsule (40 mg total) by mouth daily 90 capsule 1    Promethazine-DM (PHENERGAN-DM) 6 25-15 mg/5 mL oral syrup Take 5 mL by mouth 4 (four) times a day as needed for cough 420 mL 0    verapamil (VERELAN PM) 360 MG 24 hr capsule        No current facility-administered medications for this visit  Review of Systems   Constitutional: Negative for activity change, appetite change, chills, fatigue and fever  HENT: Negative for facial swelling and trouble swallowing  Respiratory: Negative for chest tightness and shortness of breath  Cardiovascular: Negative for chest pain, palpitations and leg swelling  Gastrointestinal: Negative for abdominal pain and nausea  Musculoskeletal: Positive for arthralgias  Negative for back pain and myalgias  Neurological: Negative for dizziness, light-headedness and headaches           Objective:  Vitals:    03/19/20 1418   BP: 118/84   Pulse: 92   Temp: 98 2 °F (36 8 °C)   SpO2: 98%   Weight: 112 kg (246 lb)   Height: 6' 3" (1 905 m)     Body mass index is 30 75 kg/m²  Physical Exam   Constitutional: He is oriented to person, place, and time  He appears well-developed and well-nourished  HENT:   Head: Normocephalic  Right Ear: External ear normal    Left Ear: External ear normal    Eyes: Conjunctivae are normal    Cardiovascular: Normal rate and regular rhythm  Pulmonary/Chest: Effort normal    Musculoskeletal: Normal range of motion  Neurological: He is alert and oriented to person, place, and time  Skin: Skin is warm and dry  Nursing note and vitals reviewed

## 2020-03-30 ENCOUNTER — TELEPHONE (OUTPATIENT)
Dept: FAMILY MEDICINE CLINIC | Facility: CLINIC | Age: 55
End: 2020-03-30

## 2020-04-07 ENCOUNTER — TELEPHONE (OUTPATIENT)
Dept: MRI IMAGING | Facility: HOSPITAL | Age: 55
End: 2020-04-07

## 2020-04-07 ENCOUNTER — TELEPHONE (OUTPATIENT)
Dept: RADIOLOGY | Facility: HOSPITAL | Age: 55
End: 2020-04-07

## 2020-04-08 DIAGNOSIS — I10 HYPERTENSION, ESSENTIAL: ICD-10-CM

## 2020-04-08 RX ORDER — IRBESARTAN/HYDROCHLOROTHIAZIDE 300-12.5MG
TABLET ORAL
Qty: 90 TABLET | Refills: 3 | Status: SHIPPED | OUTPATIENT
Start: 2020-04-08 | End: 2020-05-07 | Stop reason: SDUPTHER

## 2020-04-13 ENCOUNTER — TELEPHONE (OUTPATIENT)
Dept: FAMILY MEDICINE CLINIC | Facility: CLINIC | Age: 55
End: 2020-04-13

## 2020-04-13 DIAGNOSIS — K04.7 DENTAL ABSCESS: Primary | ICD-10-CM

## 2020-04-13 RX ORDER — AMOXICILLIN 500 MG/1
500 CAPSULE ORAL EVERY 8 HOURS SCHEDULED
Qty: 30 CAPSULE | Refills: 0 | Status: SHIPPED | OUTPATIENT
Start: 2020-04-13 | End: 2020-04-23

## 2020-05-07 DIAGNOSIS — I10 HYPERTENSION, ESSENTIAL: ICD-10-CM

## 2020-05-07 RX ORDER — IRBESARTAN/HYDROCHLOROTHIAZIDE 300-12.5MG
1 TABLET ORAL DAILY
Qty: 90 TABLET | Refills: 3 | Status: SHIPPED | OUTPATIENT
Start: 2020-05-07 | End: 2020-07-06 | Stop reason: SDUPTHER

## 2020-05-26 DIAGNOSIS — Z79.4 TYPE 2 DIABETES MELLITUS WITH DIABETIC POLYNEUROPATHY, WITH LONG-TERM CURRENT USE OF INSULIN (HCC): ICD-10-CM

## 2020-05-26 DIAGNOSIS — E11.65 TYPE 2 DIABETES MELLITUS WITH HYPERGLYCEMIA, WITH LONG-TERM CURRENT USE OF INSULIN (HCC): ICD-10-CM

## 2020-05-26 DIAGNOSIS — E11.42 TYPE 2 DIABETES MELLITUS WITH DIABETIC POLYNEUROPATHY, WITH LONG-TERM CURRENT USE OF INSULIN (HCC): ICD-10-CM

## 2020-05-26 DIAGNOSIS — Z79.4 TYPE 2 DIABETES MELLITUS WITH HYPERGLYCEMIA, WITH LONG-TERM CURRENT USE OF INSULIN (HCC): ICD-10-CM

## 2020-06-19 DIAGNOSIS — E11.42 TYPE 2 DIABETES MELLITUS WITH DIABETIC POLYNEUROPATHY, WITH LONG-TERM CURRENT USE OF INSULIN (HCC): ICD-10-CM

## 2020-06-19 DIAGNOSIS — E11.65 TYPE 2 DIABETES MELLITUS WITH HYPERGLYCEMIA, WITH LONG-TERM CURRENT USE OF INSULIN (HCC): ICD-10-CM

## 2020-06-19 DIAGNOSIS — Z79.4 TYPE 2 DIABETES MELLITUS WITH DIABETIC POLYNEUROPATHY, WITH LONG-TERM CURRENT USE OF INSULIN (HCC): ICD-10-CM

## 2020-06-19 DIAGNOSIS — Z79.4 TYPE 2 DIABETES MELLITUS WITH HYPERGLYCEMIA, WITH LONG-TERM CURRENT USE OF INSULIN (HCC): ICD-10-CM

## 2020-06-21 DIAGNOSIS — K21.9 GASTROESOPHAGEAL REFLUX DISEASE WITHOUT ESOPHAGITIS: ICD-10-CM

## 2020-06-22 RX ORDER — OMEPRAZOLE 40 MG/1
CAPSULE, DELAYED RELEASE ORAL
Qty: 90 CAPSULE | Refills: 3 | Status: SHIPPED | OUTPATIENT
Start: 2020-06-22 | End: 2021-10-08 | Stop reason: SDUPTHER

## 2020-06-29 ENCOUNTER — TELEPHONE (OUTPATIENT)
Dept: INTERVENTIONAL RADIOLOGY/VASCULAR | Facility: HOSPITAL | Age: 55
End: 2020-06-29

## 2020-06-29 DIAGNOSIS — E11.42 TYPE 2 DIABETES MELLITUS WITH DIABETIC POLYNEUROPATHY, WITH LONG-TERM CURRENT USE OF INSULIN (HCC): ICD-10-CM

## 2020-06-29 DIAGNOSIS — Z79.4 TYPE 2 DIABETES MELLITUS WITH DIABETIC POLYNEUROPATHY, WITH LONG-TERM CURRENT USE OF INSULIN (HCC): ICD-10-CM

## 2020-06-29 RX ORDER — INSULIN GLULISINE 100 [IU]/ML
INJECTION, SOLUTION SUBCUTANEOUS
Qty: 45 ML | Refills: 6 | Status: SHIPPED | OUTPATIENT
Start: 2020-06-29 | End: 2020-07-06 | Stop reason: SDUPTHER

## 2020-06-30 ENCOUNTER — TELEPHONE (OUTPATIENT)
Dept: INTERVENTIONAL RADIOLOGY/VASCULAR | Facility: HOSPITAL | Age: 55
End: 2020-06-30

## 2020-07-02 ENCOUNTER — HOSPITAL ENCOUNTER (OUTPATIENT)
Dept: ULTRASOUND IMAGING | Facility: HOSPITAL | Age: 55
Discharge: HOME | End: 2020-07-02
Admitting: RADIOLOGY
Payer: COMMERCIAL

## 2020-07-02 ENCOUNTER — TELEPHONE (OUTPATIENT)
Dept: FAMILY MEDICINE CLINIC | Facility: CLINIC | Age: 55
End: 2020-07-02

## 2020-07-02 DIAGNOSIS — E05.20 TOXIC MULTINODUL GOITER: ICD-10-CM

## 2020-07-02 PROCEDURE — 88173 CYTOPATH EVAL FNA REPORT: CPT | Performed by: PATHOLOGY

## 2020-07-02 PROCEDURE — 88172 CYTP DX EVAL FNA 1ST EA SITE: CPT | Performed by: PATHOLOGY

## 2020-07-02 PROCEDURE — 10005 FNA BX W/US GDN 1ST LES: CPT

## 2020-07-02 PROCEDURE — 10006 FNA BX W/US GDN EA ADDL: CPT

## 2020-07-02 RX ORDER — LIDOCAINE HYDROCHLORIDE 10 MG/ML
4 INJECTION, SOLUTION EPIDURAL; INFILTRATION; INTRACAUDAL; PERINEURAL ONCE
Status: DISCONTINUED | OUTPATIENT
Start: 2020-07-02 | End: 2020-07-06 | Stop reason: HOSPADM

## 2020-07-06 ENCOUNTER — OFFICE VISIT (OUTPATIENT)
Dept: FAMILY MEDICINE CLINIC | Facility: CLINIC | Age: 55
End: 2020-07-06
Payer: COMMERCIAL

## 2020-07-06 VITALS
BODY MASS INDEX: 31.23 KG/M2 | HEIGHT: 75 IN | SYSTOLIC BLOOD PRESSURE: 98 MMHG | TEMPERATURE: 99.5 F | WEIGHT: 251.2 LBS | HEART RATE: 83 BPM | DIASTOLIC BLOOD PRESSURE: 64 MMHG | OXYGEN SATURATION: 93 %

## 2020-07-06 DIAGNOSIS — Z12.11 SCREENING FOR COLON CANCER: ICD-10-CM

## 2020-07-06 DIAGNOSIS — K21.9 GASTROESOPHAGEAL REFLUX DISEASE WITHOUT ESOPHAGITIS: ICD-10-CM

## 2020-07-06 DIAGNOSIS — I10 HYPERTENSION, ESSENTIAL: ICD-10-CM

## 2020-07-06 DIAGNOSIS — E11.42 TYPE 2 DIABETES MELLITUS WITH DIABETIC POLYNEUROPATHY, WITH LONG-TERM CURRENT USE OF INSULIN (HCC): Primary | ICD-10-CM

## 2020-07-06 DIAGNOSIS — K63.5 POLYP OF COLON, UNSPECIFIED PART OF COLON, UNSPECIFIED TYPE: ICD-10-CM

## 2020-07-06 DIAGNOSIS — E11.65 TYPE 2 DIABETES MELLITUS WITH HYPERGLYCEMIA, WITH LONG-TERM CURRENT USE OF INSULIN (HCC): ICD-10-CM

## 2020-07-06 DIAGNOSIS — Z00.00 HEALTH MAINTENANCE EXAMINATION: ICD-10-CM

## 2020-07-06 DIAGNOSIS — E05.90 HYPERTHYROIDISM: ICD-10-CM

## 2020-07-06 DIAGNOSIS — E04.2 MULTIPLE THYROID NODULES: ICD-10-CM

## 2020-07-06 DIAGNOSIS — Z12.5 PROSTATE CANCER SCREENING: ICD-10-CM

## 2020-07-06 DIAGNOSIS — Z79.4 TYPE 2 DIABETES MELLITUS WITH DIABETIC POLYNEUROPATHY, WITH LONG-TERM CURRENT USE OF INSULIN (HCC): Primary | ICD-10-CM

## 2020-07-06 DIAGNOSIS — Z79.4 TYPE 2 DIABETES MELLITUS WITH HYPERGLYCEMIA, WITH LONG-TERM CURRENT USE OF INSULIN (HCC): ICD-10-CM

## 2020-07-06 PROCEDURE — 99396 PREV VISIT EST AGE 40-64: CPT | Performed by: FAMILY MEDICINE

## 2020-07-06 PROCEDURE — 3046F HEMOGLOBIN A1C LEVEL >9.0%: CPT | Performed by: FAMILY MEDICINE

## 2020-07-06 RX ORDER — IRBESARTAN/HYDROCHLOROTHIAZIDE 300-12.5MG
1 TABLET ORAL DAILY
Qty: 90 TABLET | Refills: 3 | Status: SHIPPED | OUTPATIENT
Start: 2020-07-06 | End: 2020-09-21 | Stop reason: HOSPADM

## 2020-07-06 NOTE — PROGRESS NOTES
Assessment/Plan:         Problem List Items Addressed This Visit        Digestive    Gastroesophageal reflux disease without esophagitis     Continue Prilosec 20 mg daily         Colon polyps     Follow-up with GI  It has been over 5 years since he has had colonoscopy  Endocrine    Type 2 diabetes mellitus with hyperglycemia, with long-term current use of insulin ([facility])    Relevant Medications    insulin glulisine (Apidra SoloStar) 100 units/mL injection pen    insulin glargine (Lantus SoloStar) 100 units/mL injection pen    Type 2 diabetes mellitus with diabetic polyneuropathy, with long-term current use of insulin ([facility]) - Primary    Relevant Medications    insulin glulisine (Apidra SoloStar) 100 units/mL injection pen    insulin glargine (Lantus SoloStar) 100 units/mL injection pen    Other Relevant Orders    HEMOGLOBIN A1C W/ EAG ESTIMATION    Multiple thyroid nodules    Hyperthyroidism     Follow-up with endocrinology            Cardiovascular and Mediastinum    Hypertension, essential     Continue verapamil 3 in 60 mg daily         Relevant Medications    AVALIDE 300-12 5 MG per tablet       Other    Health maintenance examination      Other Visit Diagnoses     Screening for colon cancer        Relevant Orders    Ambulatory referral to Gastroenterology    Prostate cancer screening        Relevant Orders    PSA, Total Screen            Subjective:      Patient ID: Carolina Weaver is a 60 y o  male  Patient comes in for checkup  He is now seeing Endocrinology for his diabetes and hyperthyroidism  His blood sugar was in the 300s yesterday  He stop taking his Tapazole due to sore throat  Last week he had biopsy of his thyroid nodules he does not know the results yet  The following portions of the patient's history were reviewed and updated as appropriate:   He has a past medical history of Diabetes mellitus ([address] ), Hyperlipidemia, and Hypertension  ,  does not have any pertinent problems on file ,   has a past surgical history that includes Foot surgery (Right) and US guided thyroid biopsy (7/2/2020)  ,  family history includes Cancer in his mother; Hypertension in his father  ,   reports that he has never smoked  He has never used smokeless tobacco  He reports that he drinks about 8 0 standard drinks of alcohol per week  He reports that he does not use drugs  ,  has No Known Allergies     Current Outpatient Medications   Medication Sig Dispense Refill    AVALIDE 300-12 5 MG per tablet Take 1 tablet by mouth daily 90 tablet 3    BD PEN NEEDLE DEV U/F 32G X 4 MM MISC Inject under the skin 4 (four) times a day 400 each 1    Blood Glucose Monitoring Suppl (ONE TOUCH ULTRA 2) w/Device KIT by Does not apply route 2 (two) times a day 1 each 0    glucose blood test strip Check blood sugar daily 100 each 3    insulin glargine (Lantus SoloStar) 100 units/mL injection pen 80 u bedtime 45 mL 5    insulin glulisine (Apidra SoloStar) 100 units/mL injection pen Inject 40 Units under the skin 3 (three) times a day with meals 5 pen 6    omeprazole (PriLOSEC) 40 MG capsule TAKE 1 CAPSULE DAILY 90 capsule 3    verapamil (VERELAN PM) 360 MG 24 hr capsule       fluticasone (FLONASE) 50 mcg/act nasal spray 2 sprays into each nostril daily (Patient not taking: Reported on 7/6/2020) 1 Bottle 1     No current facility-administered medications for this visit  Review of Systems   Constitutional: Negative  Respiratory: Negative  Cardiovascular: Negative  Objective:  Vitals:    07/06/20 1432   BP: 98/64   Pulse: 83   Temp: 99 5 °F (37 5 °C)   SpO2: 93%   Weight: 114 kg (251 lb 3 2 oz)   Height: 6' 3" (1 905 m)     Body mass index is 31 4 kg/m²  Physical Exam   Constitutional: He is oriented to person, place, and time  He appears well-developed and well-nourished  HENT:   Head: Normocephalic and atraumatic     Right Ear: Tympanic membrane normal    Left Ear: Tympanic membrane normal    Eyes: Pupils are equal, round, and reactive to light  EOM are normal    Neck: Neck supple  Cardiovascular: Normal rate, regular rhythm and normal heart sounds  Pulses are no weak pulses  Pulses:       Dorsalis pedis pulses are 1+ on the right side, and 1+ on the left side  Posterior tibial pulses are 1+ on the right side, and 1+ on the left side  Pulmonary/Chest: Effort normal and breath sounds normal    Abdominal: Soft  Bowel sounds are normal    Musculoskeletal: Normal range of motion  Feet:   Right Foot:   Skin Integrity: Negative for ulcer, skin breakdown, erythema, warmth, callus or dry skin  Left Foot:   Skin Integrity: Negative for ulcer, skin breakdown, erythema, warmth, callus or dry skin  Neurological: He is alert and oriented to person, place, and time  Skin: Skin is warm and dry  Psychiatric: He has a normal mood and affect  Thought content normal      Diabetic Foot Exam    Patient's shoes and socks removed  Right Foot/Ankle   Right Foot Inspection  Skin Exam: skin normal and skin intact no dry skin, no warmth, no callus, no erythema, no maceration, no abnormal color, no pre-ulcer, no ulcer and no callus                          Toe Exam: ROM and strength within normal limits  Sensory   Vibration: diminished  Proprioception: diminished   Monofilament testing: diminished  Vascular    The right DP pulse is 1+  The right PT pulse is 1+  Left Foot/Ankle  Left Foot Inspection  Skin Exam: skin normal and skin intactno dry skin, no warmth, no erythema, no maceration, normal color, no pre-ulcer, no ulcer and no callus                         Toe Exam: ROM and strength within normal limits                   Sensory   Vibration: diminished  Proprioception: diminished  Monofilament: diminished  Vascular    The left DP pulse is 1+  The left PT pulse is 1+  Assign Risk Category:  No deformity present; Loss of protective sensation;  No weak pulses       Risk: 1

## 2020-07-06 NOTE — PATIENT INSTRUCTIONS
Basic Carbohydrate Counting   WHAT YOU NEED TO KNOW:   Carbohydrate counting is a way to plan your meals by counting the amount of carbohydrate in foods  Carbohydrates are the sugars, starches, and fiber found in fruit, grains, vegetables, and milk products  Carbohydrates increase your blood sugar levels  Carbohydrate counting can help you eat the right amount of carbohydrate to keep your blood sugar levels under control  DISCHARGE INSTRUCTIONS:   What you need to know about planning meals using carbohydrate counting:  · A dietitian or healthcare provider will help you develop a healthy meal plan that works best for you  You will be taught how much carbohydrate to eat or drink for each meal and snack  Your meal plan will be based on your age, weight, usual food intake, and physical activity level  If you have diabetes, it will also include your blood sugar levels and diabetes medicine  Once you know how much carbohydrate you should eat, you can decide what type of food you want to eat  · You will need to know what foods contain carbohydrate and how much they contain  Keep track of the amount of carbohydrate in meals and snacks in order to follow your meal plan  Do not avoid carbohydrates or skip meals  Your blood sugar may fall too low if you do not eat enough carbohydrate or you skip meals  Foods that contain carbohydrate:   · Breads:  Each serving of food listed below contains about 15 g of carbohydrate   ¨ 1 slice of bread (1 ounce) or 1 flour or corn tortilla (6 inch)    ¨ ½ of a hamburger bun or ¼ of a large bagel (about 1 ounce)    ¨ 1 pancake (about 4 inches across and ¼ inch thick)    · Cereals and grains:  Serving sizes of ready-to-eat cereals vary  Look at the serving size and the total carbohydrate amount listed on the food label  Each serving of food listed below contains about 15 g of carbohydrate       ¨ ¾ cup of dry, unsweetened, ready-to-eat cereal or ¼ cup of low-fat granola     ¨ ½ cup of oatmeal or other cooked cereal     ¨ ? cup of cooked rice or pasta    · Starchy vegetables and beans:  Each serving of food listed below contains about 15 g of carbohydrate   ¨ ½ cup of corn, green peas, sweet potatoes, or mashed potatoes    ¨ ¼ of a large baked potato    ¨ ½ cup of beans, lentils, and peas (garbanzo, riley, kidney, white, split, black-eyed)    · Crackers and snacks:  Each serving of food listed below contains about 15 g of carbohydrate   ¨ 3 kandice cracker squares or 8 animal crackers     ¨ 6 saltine-type crackers    ¨ 3 cups of popcorn or ¾ ounce of pretzels, potato chips, or tortilla chips    · Fruit:  Each serving of food listed below contains about 15 g of carbohydrate   ¨ 1 small (4 ounce) piece of fresh fruit or ¾ to 1 cup of fresh fruit    ¨ ½ cup of canned or frozen fruit, packed in natural juice    ¨ ½ cup (4 ounces) of unsweetened fruit juice    ¨ 2 tablespoons of dried fruit    · Desserts or sugary foods:  Each serving of food listed below contains about 15 g of carbohydrate   ¨ 2-inch square unfrosted cake or brownie     ¨ 2 small cookies    ¨ ½ cup of ice cream, frozen yogurt, or nondairy frozen yogurt    ¨ ¼ cup of sherbet or sorbet    ¨ 1 tablespoon of regular syrup, jam, or jelly    ¨ 2 tablespoons of light syrup    · Milk and yogurt:  Foods from the milk group contain about 12 g of carbohydrate per serving  ¨ 1 cup of fat-free or low-fat milk    ¨ 1 cup of soy milk    ¨ ? cup of fat-free, yogurt sweetened with artificial sweetener    · Non-starchy vegetables:  Each serving contains about 5 g of carbohydrate   Three servings of non-starch vegetables count as 1 carbohydrate serving  ¨ ½ cup of cooked vegetables or 1 cup of raw vegetables   This includes beets, broccoli, cabbage, cauliflower, cucumber, mushrooms, tomatoes, and zucchini    ¨ ½ cup of vegetable juice  How to use carbohydrate counting to plan meals:   · Count carbohydrate amounts using serving sizes: ¨ Pasta dinner example: You plan to have pasta, tossed salad, and an 8-ounce glass of milk  Your healthcare provider tells you that you may have 4 carbohydrate servings for dinner  One carbohydrate serving of pasta is ? cup  One cup of pasta will equal 3 carbohydrate servings  An 8-ounce glass of milk will count as 1 carbohydrate serving  These amounts of food would equal 4 carbohydrate servings  One cup of tossed salad does not count toward your carbohydrate servings  · Count carbohydrate amounts using food labels:  Find the total amount of carbohydrate in a packaged food by reading the food label  Food labels tell you the serving size of the food and the total carbohydrate amount in each serving  Find the serving size on the food label and then decide how many servings you will eat  Multiply the number of servings you plan to eat by the carbohydrate amount per serving  ¨ Granola bar snack example: Your meal plan allows you to have 2 carbohydrate servings (30 grams) of carbohydrate for a snack  You plan to eat 1 package of granola bars, which contains 2 bars  According to the food label, the serving size of food in this package is 1 bar  Each serving (1 bar) contains 25 grams of carbohydrate  The total amount of carbohydrate in this package of granola bars would be 50 g  Based on your meal plan, you should eat only 1 bar  Follow up with your healthcare provider as directed:  Write down your questions so you remember to ask them during your visits  © 2017 [facility] Information is for End User's use only and may not be sold, redistributed or otherwise used for commercial purposes  All illustrations and images included in CareNotes® are the copyrighted property of [employer]  or Richy Murillo  The above information is an  only  It is not intended as medical advice for individual conditions or treatments   Talk to your doctor, nurse or pharmacist before following any medical regimen to see if it is safe and effective for you

## 2020-07-09 ENCOUNTER — TELEPHONE (OUTPATIENT)
Dept: FAMILY MEDICINE CLINIC | Facility: CLINIC | Age: 55
End: 2020-07-09

## 2020-07-09 DIAGNOSIS — Z79.4 TYPE 2 DIABETES MELLITUS WITH DIABETIC POLYNEUROPATHY, WITH LONG-TERM CURRENT USE OF INSULIN (HCC): ICD-10-CM

## 2020-07-09 DIAGNOSIS — E11.42 TYPE 2 DIABETES MELLITUS WITH DIABETIC POLYNEUROPATHY, WITH LONG-TERM CURRENT USE OF INSULIN (HCC): ICD-10-CM

## 2020-07-09 NOTE — TELEPHONE ENCOUNTER
The quantity originally prescribed is less than allowed on the patient's prescription plan  Increasing the days supply may allow the patient to take full advantage of their plan benefits  Please send in a new 90 day prescription

## 2020-07-10 NOTE — TELEPHONE ENCOUNTER
Sorry, I thought I clicked on the Lantus  Per the pharmacy  It was not written as a 90 day prescription

## 2020-07-28 ENCOUNTER — TRANSCRIBE ORDERS (OUTPATIENT)
Dept: ADMINISTRATIVE | Facility: HOSPITAL | Age: 55
End: 2020-07-28

## 2020-07-28 DIAGNOSIS — C73 THYROID CANCER (HCC): Primary | ICD-10-CM

## 2020-07-30 ENCOUNTER — TELEPHONE (OUTPATIENT)
Dept: FAMILY MEDICINE CLINIC | Facility: CLINIC | Age: 55
End: 2020-07-30

## 2020-07-30 NOTE — TELEPHONE ENCOUNTER
Patient wanted you to know that he saw his endocrinologist  He was recently diagnosed with thyroid cancer  They encouraged him to make an appointment with you  Your next available was 8/28  He is also scheduled to see radiology for an ultrasound  If you need to see him sooner, please advise

## 2020-07-31 ENCOUNTER — APPOINTMENT (OUTPATIENT)
Dept: LAB | Facility: HOSPITAL | Age: 55
End: 2020-07-31
Payer: COMMERCIAL

## 2020-07-31 DIAGNOSIS — E05.90 HYPERTHYROIDISM: ICD-10-CM

## 2020-07-31 DIAGNOSIS — Z79.4 TYPE 2 DIABETES MELLITUS WITH DIABETIC POLYNEUROPATHY, WITH LONG-TERM CURRENT USE OF INSULIN (HCC): ICD-10-CM

## 2020-07-31 DIAGNOSIS — Z12.5 PROSTATE CANCER SCREENING: ICD-10-CM

## 2020-07-31 DIAGNOSIS — E11.42 TYPE 2 DIABETES MELLITUS WITH DIABETIC POLYNEUROPATHY, WITH LONG-TERM CURRENT USE OF INSULIN (HCC): ICD-10-CM

## 2020-07-31 LAB
ALBUMIN SERPL BCP-MCNC: 3.4 G/DL (ref 3.5–5)
ALP SERPL-CCNC: 86 U/L (ref 46–116)
ALT SERPL W P-5'-P-CCNC: 59 U/L (ref 12–78)
ANION GAP SERPL CALCULATED.3IONS-SCNC: 11 MMOL/L (ref 4–13)
AST SERPL W P-5'-P-CCNC: 39 U/L (ref 5–45)
BILIRUB SERPL-MCNC: 1.1 MG/DL (ref 0.2–1)
BUN SERPL-MCNC: 38 MG/DL (ref 5–25)
CALCIUM SERPL-MCNC: 9.5 MG/DL (ref 8.3–10.1)
CHLORIDE SERPL-SCNC: 96 MMOL/L (ref 100–108)
CO2 SERPL-SCNC: 26 MMOL/L (ref 21–32)
CREAT SERPL-MCNC: 1.37 MG/DL (ref 0.6–1.3)
EST. AVERAGE GLUCOSE BLD GHB EST-MCNC: 240 MG/DL
GFR SERPL CREATININE-BSD FRML MDRD: 57 ML/MIN/1.73SQ M
GLUCOSE P FAST SERPL-MCNC: 226 MG/DL (ref 65–99)
HBA1C MFR BLD: 10 %
POTASSIUM SERPL-SCNC: 4.5 MMOL/L (ref 3.5–5.3)
PROT SERPL-MCNC: 8.4 G/DL (ref 6.4–8.2)
PSA SERPL-MCNC: 0.5 NG/ML (ref 0–4)
SODIUM SERPL-SCNC: 133 MMOL/L (ref 136–145)
T4 FREE SERPL-MCNC: 1.46 NG/DL (ref 0.76–1.46)
TSH SERPL DL<=0.05 MIU/L-ACNC: 0.16 UIU/ML (ref 0.36–3.74)

## 2020-07-31 PROCEDURE — 86800 THYROGLOBULIN ANTIBODY: CPT

## 2020-07-31 PROCEDURE — 83036 HEMOGLOBIN GLYCOSYLATED A1C: CPT

## 2020-07-31 PROCEDURE — 80053 COMPREHEN METABOLIC PANEL: CPT

## 2020-07-31 PROCEDURE — 36415 COLL VENOUS BLD VENIPUNCTURE: CPT

## 2020-07-31 PROCEDURE — G0103 PSA SCREENING: HCPCS

## 2020-07-31 PROCEDURE — 84439 ASSAY OF FREE THYROXINE: CPT

## 2020-07-31 PROCEDURE — 84443 ASSAY THYROID STIM HORMONE: CPT

## 2020-08-01 LAB — THYROGLOB AB SERPL-ACNC: <1 IU/ML (ref 0–0.9)

## 2020-08-04 ENCOUNTER — APPOINTMENT (INPATIENT)
Dept: MRI IMAGING | Facility: HOSPITAL | Age: 55
DRG: 854 | End: 2020-08-04
Payer: COMMERCIAL

## 2020-08-04 ENCOUNTER — HOSPITAL ENCOUNTER (INPATIENT)
Facility: HOSPITAL | Age: 55
LOS: 8 days | Discharge: HOME | DRG: 854 | End: 2020-08-12
Attending: EMERGENCY MEDICINE | Admitting: INTERNAL MEDICINE
Payer: COMMERCIAL

## 2020-08-04 ENCOUNTER — APPOINTMENT (EMERGENCY)
Dept: RADIOLOGY | Facility: HOSPITAL | Age: 55
DRG: 854 | End: 2020-08-04
Payer: COMMERCIAL

## 2020-08-04 DIAGNOSIS — A41.9 SEPSIS (HCC): Primary | ICD-10-CM

## 2020-08-04 DIAGNOSIS — I96 GANGRENOUS TOE (HCC): ICD-10-CM

## 2020-08-04 DIAGNOSIS — E87.6 HYPOKALEMIA: ICD-10-CM

## 2020-08-04 DIAGNOSIS — E11.9 DIABETES (HCC): ICD-10-CM

## 2020-08-04 DIAGNOSIS — Z91.19 HISTORY OF NONADHERENCE TO MEDICAL TREATMENT: ICD-10-CM

## 2020-08-04 PROBLEM — R65.20 SEVERE SEPSIS (HCC): Status: ACTIVE | Noted: 2020-08-04

## 2020-08-04 PROBLEM — E87.1 HYPONATREMIA: Status: ACTIVE | Noted: 2020-08-04

## 2020-08-04 PROBLEM — N17.9 ACUTE KIDNEY INJURY (HCC): Status: ACTIVE | Noted: 2020-08-04

## 2020-08-04 LAB
ALBUMIN SERPL BCP-MCNC: 3.1 G/DL (ref 3.5–5)
ALP SERPL-CCNC: 95 U/L (ref 46–116)
ALT SERPL W P-5'-P-CCNC: 39 U/L (ref 12–78)
ANION GAP SERPL CALCULATED.3IONS-SCNC: 13 MMOL/L (ref 4–13)
APTT PPP: 29 SECONDS (ref 23–37)
AST SERPL W P-5'-P-CCNC: 31 U/L (ref 5–45)
BASOPHILS # BLD AUTO: 0.07 THOUSANDS/ΜL (ref 0–0.1)
BASOPHILS NFR BLD AUTO: 0 % (ref 0–1)
BILIRUB DIRECT SERPL-MCNC: 0.17 MG/DL (ref 0–0.2)
BILIRUB SERPL-MCNC: 1.2 MG/DL (ref 0.2–1)
BUN SERPL-MCNC: 33 MG/DL (ref 5–25)
CALCIUM SERPL-MCNC: 9.3 MG/DL (ref 8.3–10.1)
CHLORIDE SERPL-SCNC: 93 MMOL/L (ref 100–108)
CO2 SERPL-SCNC: 23 MMOL/L (ref 21–32)
CREAT SERPL-MCNC: 1.36 MG/DL (ref 0.6–1.3)
EOSINOPHIL # BLD AUTO: 0.04 THOUSAND/ΜL (ref 0–0.61)
EOSINOPHIL NFR BLD AUTO: 0 % (ref 0–6)
ERYTHROCYTE [DISTWIDTH] IN BLOOD BY AUTOMATED COUNT: 11.6 % (ref 11.6–15.1)
GFR SERPL CREATININE-BSD FRML MDRD: 57 ML/MIN/1.73SQ M
GLUCOSE SERPL-MCNC: 296 MG/DL (ref 65–140)
GLUCOSE SERPL-MCNC: 299 MG/DL (ref 65–140)
GLUCOSE SERPL-MCNC: 365 MG/DL (ref 65–140)
HCT VFR BLD AUTO: 41 % (ref 36.5–49.3)
HGB BLD-MCNC: 14.1 G/DL (ref 12–17)
IMM GRANULOCYTES # BLD AUTO: 0.13 THOUSAND/UL (ref 0–0.2)
IMM GRANULOCYTES NFR BLD AUTO: 1 % (ref 0–2)
INR PPP: 1.04 (ref 0.84–1.19)
LACTATE SERPL-SCNC: 1.9 MMOL/L (ref 0.5–2)
LACTATE SERPL-SCNC: 3.1 MMOL/L (ref 0.5–2)
LYMPHOCYTES # BLD AUTO: 2.32 THOUSANDS/ΜL (ref 0.6–4.47)
LYMPHOCYTES NFR BLD AUTO: 13 % (ref 14–44)
MCH RBC QN AUTO: 31.1 PG (ref 26.8–34.3)
MCHC RBC AUTO-ENTMCNC: 34.4 G/DL (ref 31.4–37.4)
MCV RBC AUTO: 91 FL (ref 82–98)
MONOCYTES # BLD AUTO: 1.84 THOUSAND/ΜL (ref 0.17–1.22)
MONOCYTES NFR BLD AUTO: 11 % (ref 4–12)
NEUTROPHILS # BLD AUTO: 13.03 THOUSANDS/ΜL (ref 1.85–7.62)
NEUTS SEG NFR BLD AUTO: 75 % (ref 43–75)
NRBC BLD AUTO-RTO: 0 /100 WBCS
PLATELET # BLD AUTO: 331 THOUSANDS/UL (ref 149–390)
PMV BLD AUTO: 11.1 FL (ref 8.9–12.7)
POTASSIUM SERPL-SCNC: 4.7 MMOL/L (ref 3.5–5.3)
PROT SERPL-MCNC: 8.5 G/DL (ref 6.4–8.2)
PROTHROMBIN TIME: 13.8 SECONDS (ref 11.6–14.5)
RBC # BLD AUTO: 4.53 MILLION/UL (ref 3.88–5.62)
SODIUM SERPL-SCNC: 129 MMOL/L (ref 136–145)
TROPONIN I SERPL-MCNC: <0.02 NG/ML
WBC # BLD AUTO: 17.43 THOUSAND/UL (ref 4.31–10.16)

## 2020-08-04 PROCEDURE — 85730 THROMBOPLASTIN TIME PARTIAL: CPT | Performed by: EMERGENCY MEDICINE

## 2020-08-04 PROCEDURE — 80076 HEPATIC FUNCTION PANEL: CPT | Performed by: EMERGENCY MEDICINE

## 2020-08-04 PROCEDURE — 96374 THER/PROPH/DIAG INJ IV PUSH: CPT

## 2020-08-04 PROCEDURE — 96361 HYDRATE IV INFUSION ADD-ON: CPT

## 2020-08-04 PROCEDURE — 36415 COLL VENOUS BLD VENIPUNCTURE: CPT | Performed by: EMERGENCY MEDICINE

## 2020-08-04 PROCEDURE — 73718 MRI LOWER EXTREMITY W/O DYE: CPT

## 2020-08-04 PROCEDURE — 99222 1ST HOSP IP/OBS MODERATE 55: CPT | Performed by: INTERNAL MEDICINE

## 2020-08-04 PROCEDURE — 85025 COMPLETE CBC W/AUTO DIFF WBC: CPT | Performed by: EMERGENCY MEDICINE

## 2020-08-04 PROCEDURE — 83605 ASSAY OF LACTIC ACID: CPT | Performed by: EMERGENCY MEDICINE

## 2020-08-04 PROCEDURE — 87070 CULTURE OTHR SPECIMN AEROBIC: CPT | Performed by: NURSE PRACTITIONER

## 2020-08-04 PROCEDURE — 87205 SMEAR GRAM STAIN: CPT | Performed by: NURSE PRACTITIONER

## 2020-08-04 PROCEDURE — 85610 PROTHROMBIN TIME: CPT | Performed by: EMERGENCY MEDICINE

## 2020-08-04 PROCEDURE — 87186 SC STD MICRODIL/AGAR DIL: CPT | Performed by: NURSE PRACTITIONER

## 2020-08-04 PROCEDURE — 82948 REAGENT STRIP/BLOOD GLUCOSE: CPT

## 2020-08-04 PROCEDURE — 99285 EMERGENCY DEPT VISIT HI MDM: CPT | Performed by: EMERGENCY MEDICINE

## 2020-08-04 PROCEDURE — G1004 CDSM NDSC: HCPCS

## 2020-08-04 PROCEDURE — 99285 EMERGENCY DEPT VISIT HI MDM: CPT

## 2020-08-04 PROCEDURE — 73630 X-RAY EXAM OF FOOT: CPT

## 2020-08-04 PROCEDURE — 87040 BLOOD CULTURE FOR BACTERIA: CPT | Performed by: EMERGENCY MEDICINE

## 2020-08-04 PROCEDURE — 80048 BASIC METABOLIC PNL TOTAL CA: CPT | Performed by: EMERGENCY MEDICINE

## 2020-08-04 PROCEDURE — 87077 CULTURE AEROBIC IDENTIFY: CPT | Performed by: NURSE PRACTITIONER

## 2020-08-04 PROCEDURE — 84484 ASSAY OF TROPONIN QUANT: CPT | Performed by: EMERGENCY MEDICINE

## 2020-08-04 RX ORDER — SODIUM CHLORIDE 9 MG/ML
75 INJECTION, SOLUTION INTRAVENOUS CONTINUOUS
Status: DISCONTINUED | OUTPATIENT
Start: 2020-08-04 | End: 2020-08-09

## 2020-08-04 RX ORDER — INSULIN GLARGINE 100 [IU]/ML
80 INJECTION, SOLUTION SUBCUTANEOUS
Status: DISCONTINUED | OUTPATIENT
Start: 2020-08-04 | End: 2020-08-09

## 2020-08-04 RX ORDER — ACETAMINOPHEN 325 MG/1
650 TABLET ORAL EVERY 6 HOURS PRN
Status: DISCONTINUED | OUTPATIENT
Start: 2020-08-04 | End: 2020-08-12

## 2020-08-04 RX ORDER — PANTOPRAZOLE SODIUM 40 MG/1
40 TABLET, DELAYED RELEASE ORAL
Status: DISCONTINUED | OUTPATIENT
Start: 2020-08-05 | End: 2020-08-12 | Stop reason: HOSPADM

## 2020-08-04 RX ORDER — LORAZEPAM 1 MG/1
1 TABLET ORAL ONCE
Status: COMPLETED | OUTPATIENT
Start: 2020-08-04 | End: 2020-08-04

## 2020-08-04 RX ORDER — AMOXICILLIN AND CLAVULANATE POTASSIUM 875; 125 MG/1; MG/1
1 TABLET, FILM COATED ORAL EVERY 12 HOURS SCHEDULED
COMMUNITY
End: 2020-08-12 | Stop reason: HOSPADM

## 2020-08-04 RX ORDER — SACCHAROMYCES BOULARDII 250 MG
250 CAPSULE ORAL 2 TIMES DAILY
Status: DISCONTINUED | OUTPATIENT
Start: 2020-08-04 | End: 2020-08-12 | Stop reason: HOSPADM

## 2020-08-04 RX ORDER — LORAZEPAM 2 MG/ML
1 INJECTION INTRAMUSCULAR ONCE
Status: COMPLETED | OUTPATIENT
Start: 2020-08-04 | End: 2020-08-04

## 2020-08-04 RX ORDER — ONDANSETRON 2 MG/ML
4 INJECTION INTRAMUSCULAR; INTRAVENOUS EVERY 6 HOURS PRN
Status: DISCONTINUED | OUTPATIENT
Start: 2020-08-04 | End: 2020-08-12 | Stop reason: HOSPADM

## 2020-08-04 RX ORDER — HYDROXYZINE HYDROCHLORIDE 25 MG/1
25 TABLET, FILM COATED ORAL EVERY 6 HOURS PRN
Status: DISCONTINUED | OUTPATIENT
Start: 2020-08-04 | End: 2020-08-12 | Stop reason: HOSPADM

## 2020-08-04 RX ADMIN — SODIUM CHLORIDE 1000 ML: 0.9 INJECTION, SOLUTION INTRAVENOUS at 15:01

## 2020-08-04 RX ADMIN — LORAZEPAM 1 MG: 1 TABLET ORAL at 20:39

## 2020-08-04 RX ADMIN — METRONIDAZOLE 500 MG: 500 INJECTION, SOLUTION INTRAVENOUS at 18:32

## 2020-08-04 RX ADMIN — SODIUM CHLORIDE 75 ML/HR: 0.9 INJECTION, SOLUTION INTRAVENOUS at 18:32

## 2020-08-04 RX ADMIN — INSULIN LISPRO 3 UNITS: 100 INJECTION, SOLUTION INTRAVENOUS; SUBCUTANEOUS at 18:34

## 2020-08-04 RX ADMIN — INSULIN GLARGINE 80 UNITS: 100 INJECTION, SOLUTION SUBCUTANEOUS at 23:09

## 2020-08-04 RX ADMIN — SODIUM CHLORIDE 1000 ML: 0.9 INJECTION, SOLUTION INTRAVENOUS at 13:59

## 2020-08-04 RX ADMIN — INSULIN LISPRO 8 UNITS: 100 INJECTION, SOLUTION INTRAVENOUS; SUBCUTANEOUS at 18:34

## 2020-08-04 RX ADMIN — CEFEPIME HYDROCHLORIDE 2000 MG: 2 INJECTION, POWDER, FOR SOLUTION INTRAVENOUS at 15:01

## 2020-08-04 RX ADMIN — Medication 250 MG: at 18:32

## 2020-08-04 RX ADMIN — INSULIN LISPRO 2 UNITS: 100 INJECTION, SOLUTION INTRAVENOUS; SUBCUTANEOUS at 23:08

## 2020-08-04 RX ADMIN — LORAZEPAM 1 MG: 2 INJECTION INTRAMUSCULAR; INTRAVENOUS at 14:25

## 2020-08-04 RX ADMIN — VANCOMYCIN HYDROCHLORIDE 1750 MG: 1 INJECTION, POWDER, LYOPHILIZED, FOR SOLUTION INTRAVENOUS at 15:18

## 2020-08-04 NOTE — ED NOTES
Pt states he feels like anxious and more calm after ativan  Resting in bed with wife at bedside       Kevan Martel RN  08/04/20 3643

## 2020-08-04 NOTE — ED NOTES
Pt presents with left great toe necrotic and foul smelling  States fractured it 2 weeks ago and noticed today that his foot appears red and toe is necrotic  Hx of diabetes  States CBG's are in 200s at home  Doppler used for +2 pedal pulse in left foot  Pt denies any pain       Eliot Mcmanus RN  08/04/20 2356

## 2020-08-04 NOTE — PLAN OF CARE
Problem: Potential for Falls  Goal: Patient will remain free of falls  Description: INTERVENTIONS:  - Assess patient frequently for physical needs  -  Identify cognitive and physical deficits and behaviors that affect risk of falls    -  [place] fall precautions as indicated by assessment   - Educate patient/family on patient safety including physical limitations  - Instruct patient to call for assistance with activity based on assessment  - Modify environment to reduce risk of injury  - Consider OT/PT consult to assist with strengthening/mobility  8/4/2020 1631 by Karla Borrego RN  Outcome: Progressing  8/4/2020 1629 by Karla Borrego RN  Outcome: Progressing

## 2020-08-04 NOTE — SEPSIS NOTE
Sepsis Note   Beverly Machado 60 y o  male MRN: [id number]  Unit/Bed#: ED 06 Encounter: [id number]      qSOFA     Row Name 08/04/20 1427 08/04/20 1404 08/04/20 1336          Altered mental status GCS < 15            Respiratory Rate > / =22  0  0  0      Systolic BP < / =641  0  0  0      Q Sofa Score  0  0  0          Initial Sepsis Screening     Row Name 08/04/20 1441                Is the patient's history suggestive of a new or worsening infection? (!) Yes (Proceed)  -TC        Suspected source of infection  soft tissue  -TC        Are two or more of the following signs & symptoms of infection both present and new to the patient? (!) Yes (Proceed)  -TC        Indicate SIRS criteria  Tachycardia > 90 bpm;Leukocytosis (WBC > 68035 IJL)  -TC        If the answer is yes to both questions, suspicion of sepsis is present          If severe sepsis is present AND tissue hypoperfusion perists in the hour after fluid resuscitation or lactate > 4, the patient meets criteria for SEPTIC SHOCK          Are any of the following organ dysfunction criteria present within 6 hours of suspected infection and SIRS criteria that are NOT considered to be chronic conditions?         Organ dysfunction  Lactate > 2 0 mmol/L  -TC        Date of presentation of severe sepsis  08/04/20  -TC        Time of presentation of severe sepsis  1441  -TC        Tissue hypoperfusion persists in the hour after crystalloid fluid administration, evidenced, by either:          Was hypotension present within one hour of the conclusion of crystalloid fluid administration?           Date of presentation of septic shock          Time of presentation of septic shock            User Key  (r) = Recorded By, (t) = Taken By, (c) = Cosigned By    [address] Name Provider Cathy Mac MD Physician

## 2020-08-04 NOTE — H&P
H&P- Kristie Maid 1965, 60 y o  male MRN: [id number]    Unit/Bed#: -01 Encounter: [id number]    Primary Care Provider: Manisha Barry MD   Date and time admitted to hospital: 8/4/2020  1:33 PM        * Gangrene of toe of right foot [facility])  Assessment & Plan  Gangrene of right great toe and hallux with cellulitis  Reports foul-smelling discharge from the toe for 1 week and progressively worsening discoloration to right great toe  Started on Augmentin on Thursday last week by podiatrist   X-ray right foot showed possible osteomyelitis  Will check MRI right foot  Check arterial duplex  Check wound culture  Patient received IV Vanco, cefepime in ED  Will continue  Add IV Flagyl  Patient seen by podiatry, appreciate input  Patient will need surgery later this week  Recommend vascular consult  Will consult vascular  Consult ID  Severe sepsis [facility])  Assessment & Plan  POA, as evidenced by leukocytosis, tachycardia, lactic acidosis, with source of infection right great toe gangrene with cellulitis  Lactic acid 3 1 in ED  Normalized after receiving 2000 mL NS in ED  IV antibiotic as above  Check procalcitonin  Blood cultures x2 pending  IV hydration    Acute kidney injury ([address] )  Assessment & Plan  Creatinine 1 36  Baseline creatinine around 0 8  Suspect secondary to infection,+/- on Avalide at home  Hold Avalide  Gentle IV hydration  Repeat lab in a m  Results from last 7 days   Lab Units 08/04/20  1358 07/31/20  1026   BUN mg/dL 33* 38*   CREATININE mg/dL 1 36* 1 37*       Hyponatremia  Assessment & Plan  Pseudohyponatremia due to hyperglycemia  Corrected sodium 135    Monitor    Multiple thyroid nodules  Assessment & Plan  Status post thyroid biopsy  Outpatient follow-up with PCP    Type 2 diabetes mellitus with hyperglycemia, with long-term current use of insulin [facility])  Assessment & Plan  Lab Results   Component Value Date    HGBA1C 10 0 (H) 07/31/2020       Recent Labs 08/04/20  1634   POCGLU 296*       Blood Sugar Average: Last 72 hrs:  (P) 296   Poorly controlled type 2 diabetes  On Lantus 80 units HS and Apidra 40 units t i d  With meals at home  Will continue Lantus  Decrease mealtime insulin to 8 units t i d   SSI  Diabetic diet    Obesity, Class I, BMI 30 0-34 9 (see actual BMI)  Assessment & Plan  Body mass index is 30 32 kg/m²  Diet and lifestyle modification    Dyslipidemia, goal LDL below 100  Assessment & Plan  Not on medication at home  Check lipid panel    Gastroesophageal reflux disease without esophagitis  Assessment & Plan  Continue PPI    Hypertension, essential  Assessment & Plan  BP acceptable  Hold Avalide  Continue verapamil  Monitor BP        VTE Prophylaxis: Heparin  / reason for no mechanical VTE prophylaxis right foot wound   Code Status: full code  POLST: POLST form is not discussed and not completed at this time  Anticipated Length of Stay:  Patient will be admitted on an Inpatient basis with an anticipated length of stay of  > 2 midnights  Justification for Hospital Stay: right great toe gangrene with cellulitis, severe sepsis    Total Time for Visit, including Counseling / Coordination of Care: 45 minutes  Greater than 50% of this total time spent on direct patient counseling and coordination of care  Chief Complaint:   Right great toe wound    History of Present Illness:    Do Leija is a 60 y o  male with PMH of type 2 diabetes, hypertension, hyperlipidemia, thyroid cancer who presents with right great toe wound  Patient states he had fracture to right great toe 2 months ago  He was seen his podiatrist every week for past 8 weeks until 2 weeks ago when his podiatrist went on vacation  He reports noticed foul-smelling discharge from right great toe about 1 week ago and progressively worsening discoloration to right great toe  He called his podiatrist and was prescribed Augmentin  He took Augmentin since Thursday last week  Reports chills, no fever at home  He reports some pain to right great toe  Denies history of MRSA infection  Reports history of right foot surgery about 4 years ago  Denies chest pain, headaches, dizziness, SOB, nausea, vomiting, diarrhea, constipation  Patient reports he monitor his blood sugar once a day at home  Blood sugar was 200's yesterday  Review of Systems:    Review of Systems   Constitutional: Positive for chills  Endocrine:        Diabetic  Musculoskeletal:        Right great toe wound with foul-smelling drainage and discoloration   All other systems reviewed and are negative  Past Medical and Surgical History:     Past Medical History:   Diagnosis Date    [facility]     Hyperlipidemia     Hypertension     [facility]        Past Surgical History:   Procedure Laterality Date    FOOT SURGERY Right 2014    US GUIDED THYROID BIOPSY  7/2/2020       Meds/Allergies:    Prior to Admission medications    Medication Sig Start Date End Date Taking?  Authorizing Provider   amoxicillin-clavulanate (AUGMENTIN) 875-125 mg per tablet Take 1 tablet by mouth every 12 (twelve) hours   Yes Historical Provider, MD   AVALIDE 300-12 5 MG per tablet Take 1 tablet by mouth daily 7/6/20  Yes Nagi Wilkerson MD   BD PEN NEEDLE DEV U/F 32G X 4 MM MISC Inject under the skin 4 (four) times a day 1/24/19  Yes Gwen Mujica PA-C   glucose blood test strip Check blood sugar daily 2/4/20  Yes Gwen Mujica PA-C   insulin glargine (Lantus SoloStar) 100 units/mL injection pen 80 u bedtime 7/10/20  Yes Nagi Wilkerson MD   insulin glulisine (Apidra SoloStar) 100 units/mL injection pen Inject 40 Units under the skin 3 (three) times a day with meals 7/6/20  Yes Nagi Wilkerson MD   omeprazole (PriLOSEC) 40 MG capsule TAKE 1 CAPSULE DAILY 6/22/20  Yes Gwen Mujica PA-C   verapamil (VERELAN PM) 360 MG 24 hr capsule Take 360 mg by mouth daily  9/17/18  Yes Historical Provider, MD   Blood Glucose Monitoring Suppl (ONE TOUCH ULTRA 2) w/Device KIT by Does not apply route 2 (two) times a day 11/2/18   Wander Guzmán PA-C   fluticasone (FLONASE) 50 mcg/act nasal spray 2 sprays into each nostril daily  Patient not taking: Reported on 7/6/2020 1/28/20 8/4/20  Michael Goff MD     I have reviewed home medications with patient personally  Allergies: No Known Allergies    Social History:     Marital Status: Single   Occupation: n/a  Patient Pre-hospital Living Situation:  Lives with family  Patient Pre-hospital Level of Mobility:  Independent  Patient Pre-hospital Diet Restrictions:  Diabetic diet  Substance Use History:   Social History     Substance and Sexual Activity   Alcohol Use Not Currently    Alcohol/week: 8 0 standard drinks    Types: 8 Cans of beer per week    Comment: last drink was 10 days ago     Social History     Tobacco Use   Smoking Status Never Smoker   Smokeless Tobacco Never Used     Social History     Substance and Sexual Activity   Drug Use No       Family History:    Family History   Problem Relation Age of Onset    Cancer Mother     Hypertension Father        Physical Exam:     Vitals:   Blood Pressure: 131/86 (08/04/20 1605)  Pulse: 98 (08/04/20 1605)  Temperature: 100 2 °F (37 9 °C) (08/04/20 1605)  Temp Source: Oral (08/04/20 1605)  Respirations: 20 (08/04/20 1605)  Height: 6' 4" (08/04/20 1617)  Weight - Scale: 113 kg (249 lb 1 9 oz) (08/04/20 1617)  SpO2: 97 % (08/04/20 1605)    Physical Exam   Constitutional: He is oriented to person, place, and time  He appears well-developed  HENT:   Head: Normocephalic and atraumatic  Neck: Neck supple  No JVD present  No tracheal deviation present  No thyromegaly present  Cardiovascular: Normal rate, regular rhythm and normal heart sounds  No murmur heard  Pulmonary/Chest: Effort normal and breath sounds normal  No respiratory distress  He has no wheezes  He has no rales  Abdominal: Soft   Bowel sounds are normal    Musculoskeletal:         General: Tenderness and deformity present  Comments: Right great toe gangrene, base of right great toe ulcer with small amount of foul smell drainage with surrounding redness to hallux  Mild tenderness right hallux      Neurological: He is alert and oriented to person, place, and time  Skin: Skin is warm and dry  Psychiatric: Judgment normal    Nursing note and vitals reviewed  Additional Data:     Lab Results: I have personally reviewed pertinent reports  Results from last 7 days   Lab Units 08/04/20  1358   WBC Thousand/uL 17 43*   HEMOGLOBIN g/dL 14 1   HEMATOCRIT % 41 0   PLATELETS Thousands/uL 331   NEUTROS PCT % 75   LYMPHS PCT % 13*   MONOS PCT % 11   EOS PCT % 0     Results from last 7 days   Lab Units 08/04/20  1358   POTASSIUM mmol/L 4 7   CHLORIDE mmol/L 93*   CO2 mmol/L 23   BUN mg/dL 33*   CREATININE mg/dL 1 36*   CALCIUM mg/dL 9 3   ALK PHOS U/L 95   ALT U/L 39   AST U/L 31     Results from last 7 days   Lab Units 08/04/20  1358   INR  1 04       Imaging: I have personally reviewed pertinent reports  Xr Foot 3+ Vw Right    Result Date: 8/4/2020  Narrative: RIGHT FOOT INDICATION:   Toe pain, patient reports fracture to right great toe 2 months prior, now reports ulcers  COMPARISON:  None VIEWS:  XR FOOT 3+ VW RIGHT Images: 3 FINDINGS: There is patchy, mottled lucency of the 1st distal phalanx  Cortical margins are difficult to visualize in areas, particularly the lateral base  Uncertain if this is related to regional osteopenia due to recent reported injury versus osteomyelitis  Partial resection of the 5th metatarsal  There is no acute fracture or dislocation  Old fracture deformities of the 2nd and 4th metatarsals  Small plantar calcaneal spur  No lytic or blastic osseous lesion  There are atherosclerotic calcifications  No diffuse subcutaneous gas or radiopaque foreign bodies       Impression: Patchy, mottled lucency 1st distal phalanx with questionable diminished conspicuity of the cortical margins  Uncertain if this is related to regional osteopenia due to reported recent injury versus osteomyelitis  If there is clinical suspicion for infection, either short interval plain film follow-up versus MRI may be considered  Workstation performed: [id number]       EKG, Pathology, and Other Studies Reviewed on Admission:   · EKG:  Pending    Allscripts Records Reviewed: Yes     ** Please Note: Dragon 360 Dictation voice to text software may have been used in the creation of this document   **

## 2020-08-04 NOTE — ED NOTES
Pt tearful, shaking and very upset about losing toe  Pt's wife states he is very emotional and worried about his foot  Provider made aware  Medications ordered       Sameer Bernal RN  08/04/20 5080

## 2020-08-04 NOTE — ASSESSMENT & PLAN NOTE
POA, as evidenced by leukocytosis, tachycardia, lactic acidosis, with source of infection right great toe gangrene with cellulitis  Lactic acid 3 1 in ED  Normalized after receiving 2000 mL NS in ED    IV antibiotic as above  Check procalcitonin  Blood cultures x2 pending  IV hydration

## 2020-08-04 NOTE — CONSULTS
Consult - Podiatry   Zakiya Short 60 y o  male MRN: [id number]  Unit/Bed#: -01 Encounter: [id number]    Assessment/Plan     Assessment:  Dry gangrene with cellulitis right hallux  Diabetes with peripheral neuropathy and peripheral vascular manifestation  Plan:  Reviewed chart, labs, and diagnostic imaging  The patient clearly has a gangrenous right hallux and understands that amputation of the toe is going to be required  The pathophysiology and treatment of this condition were carefully and thoroughly explained  MRI is pending to assess for any further/proximal or lateral pathology  Vascular evaluation is recommended to assess extent of peripheral vascular disease and ability to heal   Anticipated amputation is going to be proximal to the 1st metatarsal head in the area of the metatarsal neck  I will be in contact with the patient's podiatrist Ellery Lesch in the morning  History of Present Illness     HPI:  Zakiya Short is a 60 y o  male who presents with dry gangrene and infection of the right hallux  Reports an injury to the toe over a month ago  States the toe was fractured and an ulcer developed underneath the toenail  He has been seeing Dr Magdalena Daily for this condition  Reports last seen about a week ago  At that time the toe was dark but only at the tip  Over the past several days the toe has gotten significantly worse  Consults  Review of Systems   Constitutional:  See admit H&P  HENT:  See admit H&P  Eyes:  See admit H&P  Respiratory:  See admit H&P  Cardiovascular:  See admit H&P  Gastrointestinal:  See admit H&P  Musculoskeletal:  Mild right foot deformity secondary to resection of the 5th metatarsophalangeal joint   Skin:  Previous history of diabetic foot infections and ulcerations  Neurological: Negative  Psych: negative         Historical Information   Past Medical History:   Diagnosis Date    [facility] mellitus ([facility] [address] )     Hyperlipidemia     Hypertension     Thyroid cancer [facility])      Past Surgical History:   Procedure Laterality Date    FOOT SURGERY Right 2014    US GUIDED THYROID BIOPSY  7/2/2020     Social History   Social History     Substance and Sexual Activity   Alcohol Use Not Currently    Alcohol/week: 8 0 standard drinks    Types: 8 Cans of beer per week    Comment: last drink was 10 days ago     Social History     Substance and Sexual Activity   Drug Use No     Social History     Tobacco Use   Smoking Status Never Smoker   Smokeless Tobacco Never Used     Family History:   Family History   Problem Relation Age of Onset    Cancer Mother     Hypertension Father        Meds/Allergies   Medications Prior to Admission   Medication    amoxicillin-clavulanate (AUGMENTIN) 875-125 mg per tablet    AVALIDE 300-12 5 MG per tablet    BD PEN NEEDLE DEV U/F 32G X 4 MM MISC    glucose blood test strip    insulin glargine (Lantus SoloStar) 100 units/mL injection pen    insulin glulisine (Apidra SoloStar) 100 units/mL injection pen    omeprazole (PriLOSEC) 40 MG capsule    verapamil (VERELAN PM) 360 MG 24 hr capsule    Blood Glucose Monitoring Suppl (ONE TOUCH ULTRA 2) w/Device KIT     No Known Allergies    Objective   First Vitals:   Blood Pressure: 118/63 (08/04/20 1336)  Pulse: (!) 106 (08/04/20 1336)  Temperature: 98 °F (36 7 °C) (08/04/20 1336)  Temp Source: Oral (08/04/20 1336)  Respirations: 16 (08/04/20 1336)  Height: 6' 4" (08/04/20 1617)  Weight - Scale: 113 kg (249 lb 1 9 oz) (08/04/20 1617)  SpO2: 98 % (08/04/20 1336)    Current Vitals:   Blood Pressure: 131/86 (08/04/20 1605)  Pulse: 98 (08/04/20 1605)  Temperature: 100 2 °F (37 9 °C) (08/04/20 1605)  Temp Source: Oral (08/04/20 1605)  Respirations: 20 (08/04/20 1605)  Height: 6' 4" (08/04/20 1617)  Weight - Scale: 113 kg (249 lb 1 9 oz) (08/04/20 1617)  SpO2: 97 % (08/04/20 1605)        /86   Pulse 98   Temp 100 2 °F (37 9 °C) (Oral)   Resp 20   Ht 6' 4"   Wt 113 kg (249 lb 1 9 oz)   SpO2 97%   BMI 30 32 kg/m²      General Appearance:    Alert, cooperative, no distress   Head:    Normocephalic, without obvious abnormality, atraumatic   Eyes:    PERRL, conjunctiva/corneas clear, EOM's intact        Nose:   Moist mucous membranes   Neck:   Supple, symmetrical, trachea midline   Back:     Symmetric   Lungs:     Respirations unlabored   Heart:    Regular rate and rhythm, S1 and S2 normal, no murmur, rub   or gallop   Abdomen:     Soft, non-tender   Extremities: There is no focal weakness  There is no acute pain to palpation or range of motion  Mild distal lateral foot deformity on the right consistent with resection of the 5th metatarsophalangeal joint  Pulses:   Pedal pulses are diminished  Dorsalis pedis is palpable +1/4 bilaterally  Posterior tibialis pulses are not palpable  There is the aforementioned dry gangrene in the right hallux  The remainder of the toes show no cyanosis or gangrene  Skin:   The skin is warm and dry  There is dry gangrene present in the right hallux to the level of the 1st metatarsophalangeal joint  There is hyperemic appearance just proximal to this  Gangrene appears to be demarcated  There is no palpable fluctuance or soft tissue crepitus  There is no drainage  There is malodor which is consistent with dry gangrene  Neurologic:   Gross sensation is diminished  Protective sensation is absent             Lab Results:   Admission on 08/04/2020   Component Date Value    WBC 08/04/2020 17 43*    RBC 08/04/2020 4 53     Hemoglobin 08/04/2020 14 1     Hematocrit 08/04/2020 41 0     MCV 08/04/2020 91     MCH 08/04/2020 31 1     MCHC 08/04/2020 34 4     RDW 08/04/2020 11 6     MPV 08/04/2020 11 1     Platelets 50/76/4053 331     nRBC 08/04/2020 0     Neutrophils Relative 08/04/2020 75     Immat GRANS % 08/04/2020 1     Lymphocytes Relative 08/04/2020 13*    Monocytes Relative 08/04/2020 11     Eosinophils Relative 08/04/2020 0     Basophils Relative 08/04/2020 0     Neutrophils Absolute 08/04/2020 13 03*    Immature Grans Absolute 08/04/2020 0 13     Lymphocytes Absolute 08/04/2020 2 32     Monocytes Absolute 08/04/2020 1 84*    Eosinophils Absolute 08/04/2020 0 04     Basophils Absolute 08/04/2020 0 07     Protime 08/04/2020 13 8     INR 08/04/2020 1 04     PTT 08/04/2020 29     Sodium 08/04/2020 129*    Potassium 08/04/2020 4 7     Chloride 08/04/2020 93*    CO2 08/04/2020 23     ANION GAP 08/04/2020 13     BUN 08/04/2020 33*    Creatinine 08/04/2020 1 36*    Glucose 08/04/2020 365*    Calcium 08/04/2020 9 3     eGFR 08/04/2020 57     Total Bilirubin 08/04/2020 1 20*    Bilirubin, Direct 08/04/2020 0 17     Alkaline Phosphatase 08/04/2020 95     AST 08/04/2020 31     ALT 08/04/2020 39     Total Protein 08/04/2020 8 5*    Albumin 08/04/2020 3 1*    LACTIC ACID 08/04/2020 3 1*    Troponin I 08/04/2020 <0 02     POC Glucose 08/04/2020 296*                   Invalid input(s): LABAEARO            Imaging: I have personally reviewed pertinent films in PACS  EKG, Pathology, and Other Studies: I have personally reviewed pertinent reports        Code Status: No Order  Advance Directive and Living Will:      Power of :    POLST:

## 2020-08-04 NOTE — ASSESSMENT & PLAN NOTE
Lab Results   Component Value Date    HGBA1C 10 0 (H) 07/31/2020       Recent Labs     08/04/20  1634   POCGLU 296*       Blood Sugar Average: Last 72 hrs:  (P) 296   Poorly controlled type 2 diabetes  On Lantus 80 units HS and Apidra 40 units t i d  With meals at home  Will continue Lantus    Decrease mealtime insulin to 8 units t i d   SSI  Diabetic diet

## 2020-08-04 NOTE — ED PROVIDER NOTES
History  Chief Complaint   Patient presents with    Toe Pain     pt reports fracture to right great toe 2 months prior  pt now reports ulcers      61 yo diabetic pt with progressively worsening L great toe discoloration and foul smelling discharge from the toe  Follows with a podiatrist but was unable to see them last week due to a vacation  For approximately the last 2 weeks he notes progressively worsening appearance with blackened L great toe and worsening smell  Denies fever, chills, nausea, vomiting, and weakness  Denies pain in the L foot but notes baseline neuropathy  Associated redness extending onto the dorsal L foot and foul smell  No other symptoms or concerns  Has been taking PO augmentin for the last week without improvement  Prior to Admission Medications   Prescriptions Last Dose Informant Patient Reported? Taking?    AVALIDE 300-12 5 MG per tablet   No No   Sig: Take 1 tablet by mouth daily   BD PEN NEEDLE DEV U/F 32G X 4 MM MISC   No No   Sig: Inject under the skin 4 (four) times a day   Blood Glucose Monitoring Suppl (ONE TOUCH ULTRA 2) w/Device KIT  Self No No   Sig: by Does not apply route 2 (two) times a day   fluticasone (FLONASE) 50 mcg/act nasal spray   No No   Si sprays into each nostril daily   Patient not taking: Reported on 2020   glucose blood test strip   No No   Sig: Check blood sugar daily   insulin glargine (Lantus SoloStar) 100 units/mL injection pen   No No   Si u bedtime   insulin glulisine (Apidra SoloStar) 100 units/mL injection pen   No No   Sig: Inject 40 Units under the skin 3 (three) times a day with meals   omeprazole (PriLOSEC) 40 MG capsule   No No   Sig: TAKE 1 CAPSULE DAILY   verapamil (VERELAN PM) 360 MG 24 hr capsule  Self Yes No      Facility-Administered Medications: None       Past Medical History:   Diagnosis Date    Diabetes mellitus ([facility] [address] )     Hyperlipidemia     Hypertension        Past Surgical History:   Procedure Laterality Date    FOOT SURGERY Right     US GUIDED THYROID BIOPSY  7/2/2020       Family History   Problem Relation Age of Onset    Cancer Mother     Hypertension Father      I have reviewed and agree with the history as documented  E-Cigarette/Vaping    E-Cigarette Use Never User      E-Cigarette/Vaping Substances     Social History     Tobacco Use    Smoking status: Never Smoker    Smokeless tobacco: Never Used   Substance Use Topics    Alcohol use: Not Currently     Alcohol/week: 8 0 standard drinks     Types: 8 Cans of beer per week    Drug use: No       Review of Systems   Constitutional: Negative for chills and fever  Skin: Positive for wound  All other systems reviewed and are negative  Physical Exam  Physical Exam  Vitals signs and nursing note reviewed  Constitutional:       General: He is not in acute distress  Appearance: He is well-developed  He is not diaphoretic  HENT:      Head: Normocephalic and atraumatic  Eyes:      Conjunctiva/sclera: Conjunctivae normal       Pupils: Pupils are equal, round, and reactive to light  Neck:      Musculoskeletal: Normal range of motion and neck supple  Vascular: No JVD  Cardiovascular:      Rate and Rhythm: Normal rate and regular rhythm  Heart sounds: Normal heart sounds  No murmur  No friction rub  No gallop  Pulmonary:      Effort: Pulmonary effort is normal  No respiratory distress  Breath sounds: Normal breath sounds  No stridor  No wheezing or rales  Abdominal:      General: There is no distension  Palpations: Abdomen is soft  Tenderness: There is no abdominal tenderness  Musculoskeletal: Normal range of motion  General: No tenderness or deformity  Comments: There is diffuse necrosis of the entirety of the L great toe  There is erythema extending proximally onto the dorsal L foot  There is no crepitus  There is no induration of the foot or focal tenderness  The is a dopplerable DP pulse      Skin: General: Skin is warm and dry  Capillary Refill: Capillary refill takes less than 2 seconds  Neurological:      Mental Status: He is alert and oriented to person, place, and time  Cranial Nerves: No cranial nerve deficit  Sensory: No sensory deficit  Motor: No abnormal muscle tone  Coordination: Coordination normal          Vital Signs  ED Triage Vitals [08/04/20 1336]   Temperature Pulse Respirations Blood Pressure SpO2   98 °F (36 7 °C) (!) 106 16 118/63 98 %      Temp Source Heart Rate Source Patient Position - Orthostatic VS BP Location FiO2 (%)   Oral Monitor Lying Right arm --      Pain Score       3           Vitals:    08/04/20 1336 08/04/20 1404 08/04/20 1427 08/04/20 1502   BP: 118/63 150/85 144/78 155/78   Pulse: (!) 106 103 104 100   Patient Position - Orthostatic VS: Lying            Visual Acuity      ED Medications  Medications   vancomycin (VANCOCIN) 1,750 mg in sodium chloride 0 9 % 500 mL IVPB (has no administration in time range)   cefepime (MAXIPIME) 2,000 mg in dextrose 5 % 50 mL IVPB (2,000 mg Intravenous New Bag 8/4/20 1501)   sodium chloride 0 9 % bolus 1,000 mL (1,000 mL Intravenous New Bag 8/4/20 1501)   sodium chloride 0 9 % bolus 1,000 mL (0 mL Intravenous Stopped 8/4/20 1500)   LORazepam (ATIVAN) injection 1 mg (1 mg Intravenous Given 8/4/20 1425)       Diagnostic Studies  Results Reviewed     Procedure Component Value Units Date/Time    Blood culture #1 [[id number]] Collected:  08/04/20 1410    Lab Status: In process Specimen:  Blood from Hand, Left Updated:  08/04/20 1445    Lactic acid [[id number]]  (Abnormal) Collected:  08/04/20 1358    Lab Status:  Final result Specimen:  Blood from Arm, Right Updated:  08/04/20 1440     LACTIC ACID 3 1 mmol/L     Narrative:       Result may be elevated if tourniquet was used during collection      Lactic acid 2 Hours [[id number]]     Lab Status:  No result Specimen:  Blood     Basic metabolic panel [[id number]]  (Abnormal) Collected:  08/04/20 1358    Lab Status:  Final result Specimen:  Blood from Arm, Right Updated:  08/04/20 1439     Sodium 129 mmol/L      Potassium 4 7 mmol/L      Chloride 93 mmol/L      CO2 23 mmol/L      ANION GAP 13 mmol/L      BUN 33 mg/dL      Creatinine 1 36 mg/dL      Glucose 365 mg/dL      Calcium 9 3 mg/dL      eGFR 57 ml/min/1 73sq m     Narrative:       [place] guidelines for Chronic Kidney Disease (CKD):     Stage 1 with normal or high GFR (GFR > 90 mL/min/1 73 square meters)    Stage 2 Mild CKD (GFR = 60-89 mL/min/1 73 square meters)    Stage 3A Moderate CKD (GFR = 45-59 mL/min/1 73 square meters)    Stage 3B Moderate CKD (GFR = 30-44 mL/min/1 73 square meters)    Stage 4 Severe CKD (GFR = 15-29 mL/min/1 73 square meters)    Stage 5 End Stage CKD (GFR <15 mL/min/1 73 square meters)  Note: GFR calculation is accurate only with a steady state creatinine    Hepatic function panel [[id number]]  (Abnormal) Collected:  08/04/20 1358    Lab Status:  Final result Specimen:  Blood from Arm, Right Updated:  08/04/20 1439     Total Bilirubin 1 20 mg/dL      Bilirubin, Direct 0 17 mg/dL      Alkaline Phosphatase 95 U/L      AST 31 U/L      ALT 39 U/L      Total Protein 8 5 g/dL      Albumin 3 1 g/dL     Troponin I [[id number]]  (Normal) Collected:  08/04/20 1358    Lab Status:  Final result Specimen:  Blood from Arm, Right Updated:  08/04/20 1428     Troponin I <0 02 ng/mL     Protime-INR [[id number]]  (Normal) Collected:  08/04/20 1358    Lab Status:  Final result Specimen:  Blood from Arm, Right Updated:  08/04/20 1422     Protime 13 8 seconds      INR 1 04    APTT [[id number]]  (Normal) Collected:  08/04/20 1358    Lab Status:  Final result Specimen:  Blood from Arm, Right Updated:  08/04/20 1422     PTT 29 seconds     CBC and differential [[id number]]  (Abnormal) Collected:  08/04/20 1358    Lab Status:  Final result Specimen:  Blood from Arm, Right Updated:  08/04/20 1408     WBC 17 43 Thousand/uL      RBC 4 53 Million/uL      Hemoglobin 14 1 g/dL      Hematocrit 41 0 %      MCV 91 fL      MCH 31 1 pg      MCHC 34 4 g/dL      RDW 11 6 %      MPV 11 1 fL      Platelets 108 Thousands/uL      nRBC 0 /100 WBCs      Neutrophils Relative 75 %      Immat GRANS % 1 %      Lymphocytes Relative 13 %      Monocytes Relative 11 %      Eosinophils Relative 0 %      Basophils Relative 0 %      Neutrophils Absolute 13 03 Thousands/µL      Immature Grans Absolute 0 13 Thousand/uL      Lymphocytes Absolute 2 32 Thousands/µL      Monocytes Absolute 1 84 Thousand/µL      Eosinophils Absolute 0 04 Thousand/µL      Basophils Absolute 0 07 Thousands/µL     Blood culture #2 [[id number]] Collected:  08/04/20 1358    Lab Status: In process Specimen:  Blood from Arm, Right Updated:  08/04/20 1405                 XR foot 3+ vw right   Final Result by Aida Sierra DO (08/04 1458)      Patchy, mottled lucency 1st distal phalanx with questionable diminished conspicuity of the cortical margins  Uncertain if this is related to regional osteopenia due to reported recent injury versus osteomyelitis  If there is clinical suspicion for    infection, either short interval plain film follow-up versus MRI may be considered  Workstation performed: [id number]                    Procedures  Procedures         ED Course       US AUDIT      Most Recent Value   Initial Alcohol Screen: US AUDIT-C    1  How often do you have a drink containing alcohol?  0 Filed at: 08/04/2020 1335   2  How many drinks containing alcohol do you have on a typical day you are drinking? 0 Filed at: 08/04/2020 1335   3a  Male UNDER 65: How often do you have five or more drinks on one occasion? 0 Filed at: 08/04/2020 1335   Audit-C Score  0 Filed at: 08/04/2020 1335                  WILLIAM/DAST-10      Most Recent Value   How many times in the past year have you    Used an illegal drug or used a prescription medication for non-medical reasons?   Never Filed at: 08/04/2020 1335              Initial Sepsis Screening     Row Name 08/04/20 1441                Is the patient's history suggestive of a new or worsening infection? (!) Yes (Proceed)  -TC        Suspected source of infection  soft tissue  -TC        Are two or more of the following signs & symptoms of infection both present and new to the patient? (!) Yes (Proceed)  -TC        Indicate SIRS criteria  Tachycardia > 90 bpm;Leukocytosis (WBC > 27839 IJL)  -TC        If the answer is yes to both questions, suspicion of sepsis is present          If severe sepsis is present AND tissue hypoperfusion perists in the hour after fluid resuscitation or lactate > 4, the patient meets criteria for SEPTIC SHOCK          Are any of the following organ dysfunction criteria present within 6 hours of suspected infection and SIRS criteria that are NOT considered to be chronic conditions?         Organ dysfunction  Lactate > 2 0 mmol/L  -TC        Date of presentation of severe sepsis  08/04/20  -TC        Time of presentation of severe sepsis  1441  -TC        Tissue hypoperfusion persists in the hour after crystalloid fluid administration, evidenced, by either:          Was hypotension present within one hour of the conclusion of crystalloid fluid administration?         Date of presentation of septic shock          Time of presentation of septic shock            User Key  (r) = Recorded By, (t) = Taken By, (c) = Cosigned By    [address] Name Provider Type    Katrin Car MD Physician                        MDM  Number of Diagnoses or Management Options  Diabetes [facility]):   [facility]   Sepsis [facility]):   Diagnosis management comments: Diabetic with necrotic infected great toe with developing sepsis  Abx, admit, podiatry consulted and will see pt today, npo in anticipation of amputation of great toe          Amount and/or Complexity of Data Reviewed  Clinical lab tests: reviewed and ordered  Tests in the radiology section of CPT®: ordered and reviewed  Tests in the medicine section of CPT®: ordered and reviewed          Disposition  Final diagnoses:   Sepsis ([facility] )   Gangrenous toe ([facility] )   Diabetes ([facility] )     Time reflects when diagnosis was documented in both MDM as applicable and the Disposition within this note     Time User Action Codes Description Comment    8/4/2020  2:53 PM Ten Pancake [A41 9] Sepsis ([place] [address] )     8/4/2020  2:53 PM Zavaleta Ferana Delay Add [I96] [facility]     8/4/2020  2:54 PM Zavaleta, [address] Shayla Barreto [E11 9] Diabetes [facility])       ED Disposition     ED Disposition Condition Date/Time Comment    Admit Stable Tue Aug 4, 2020  2:53 PM Case was discussed with Dr Nagi Hayden and the patient's admission status was agreed to be Admission Status: inpatient status to the service of Dr Nagi Hayden   Follow-up Information    None         Patient's Medications   Discharge Prescriptions    No medications on file     No discharge procedures on file      PDMP Review     None          ED Provider  Electronically Signed by           Pam Fagan MD  08/04/20 6777

## 2020-08-04 NOTE — ASSESSMENT & PLAN NOTE
Gangrene of right great toe and hallux with cellulitis  Reports foul-smelling discharge from the toe for 1 week and progressively worsening discoloration to right great toe  Started on Augmentin on Thursday last week by podiatrist   X-ray right foot showed possible osteomyelitis  Will check MRI right foot  Check arterial duplex  Check wound culture  Patient received IV Vanco, cefepime in ED  Will continue  Add IV Flagyl  Patient seen by podiatry, appreciate input  Patient will need surgery later this week  Recommend vascular consult  Will consult vascular  Consult ID

## 2020-08-04 NOTE — ASSESSMENT & PLAN NOTE
Creatinine 1 36  Baseline creatinine around 0 8  Suspect secondary to infection,+/- on Avalide at home  Hold Avalide  Gentle IV hydration  Repeat lab in a m      Results from last 7 days   Lab Units 08/04/20  1358 07/31/20  1026   BUN mg/dL 33* 38*   CREATININE mg/dL 1 36* 1 37*

## 2020-08-04 NOTE — ED NOTES
1 CC: left toe necrotic and red, warm, fractured left great toe 2 weeks ago  2  Orientation status: GCS 15, anxious at times  3  Abnormal labs/vitals/focused assessment: WBC 17 43, lactice 3 1, Na 129, BUN 33, CR  1 36, glucose 365  4  Medication/drips: NSS infusing, vanco infusing  5  Narcotic time:none  6  IV lines/drains/etc : 18g RFA  7  Isolation status:none  8  Skin: necrotic and red left great toe and foot  9  Ambulation status:self  10   ED phone number: call ER charge with questions       Gilford Halon, RN  08/04/20 [id number]

## 2020-08-04 NOTE — PLAN OF CARE
Problem: Potential for Falls  Goal: Patient will remain free of falls  Description: INTERVENTIONS:  - Assess patient frequently for physical needs  -  Identify cognitive and physical deficits and behaviors that affect risk of falls    -  [place] fall precautions as indicated by assessment   - Educate patient/family on patient safety including physical limitations  - Instruct patient to call for assistance with activity based on assessment  - Modify environment to reduce risk of injury  - Consider OT/PT consult to assist with strengthening/mobility  Outcome: Progressing

## 2020-08-05 ENCOUNTER — APPOINTMENT (INPATIENT)
Dept: ULTRASOUND IMAGING | Facility: HOSPITAL | Age: 55
DRG: 854 | End: 2020-08-05
Payer: COMMERCIAL

## 2020-08-05 PROBLEM — I73.9 PAD (PERIPHERAL ARTERY DISEASE) (HCC): Status: ACTIVE | Noted: 2020-08-05

## 2020-08-05 LAB
ANION GAP SERPL CALCULATED.3IONS-SCNC: 8 MMOL/L (ref 4–13)
BUN SERPL-MCNC: 23 MG/DL (ref 5–25)
CALCIUM SERPL-MCNC: 8.2 MG/DL (ref 8.3–10.1)
CHLORIDE SERPL-SCNC: 100 MMOL/L (ref 100–108)
CHOLEST SERPL-MCNC: 156 MG/DL (ref 50–200)
CO2 SERPL-SCNC: 26 MMOL/L (ref 21–32)
CREAT SERPL-MCNC: 0.92 MG/DL (ref 0.6–1.3)
ERYTHROCYTE [DISTWIDTH] IN BLOOD BY AUTOMATED COUNT: 11.3 % (ref 11.6–15.1)
EST. AVERAGE GLUCOSE BLD GHB EST-MCNC: 232 MG/DL
GFR SERPL CREATININE-BSD FRML MDRD: 92 ML/MIN/1.73SQ M
GLUCOSE SERPL-MCNC: 159 MG/DL (ref 65–140)
GLUCOSE SERPL-MCNC: 176 MG/DL (ref 65–140)
GLUCOSE SERPL-MCNC: 223 MG/DL (ref 65–140)
GLUCOSE SERPL-MCNC: 254 MG/DL (ref 65–140)
HBA1C MFR BLD: 9.7 %
HCT VFR BLD AUTO: 34.5 % (ref 36.5–49.3)
HDLC SERPL-MCNC: 25 MG/DL
HGB BLD-MCNC: 11.6 G/DL (ref 12–17)
LDLC SERPL CALC-MCNC: 116 MG/DL (ref 0–100)
MAGNESIUM SERPL-MCNC: 1.7 MG/DL (ref 1.6–2.6)
MCH RBC QN AUTO: 30.6 PG (ref 26.8–34.3)
MCHC RBC AUTO-ENTMCNC: 33.6 G/DL (ref 31.4–37.4)
MCV RBC AUTO: 91 FL (ref 82–98)
PLATELET # BLD AUTO: 297 THOUSANDS/UL (ref 149–390)
PMV BLD AUTO: 11 FL (ref 8.9–12.7)
POTASSIUM SERPL-SCNC: 3.7 MMOL/L (ref 3.5–5.3)
PROCALCITONIN SERPL-MCNC: 0.13 NG/ML
RBC # BLD AUTO: 3.79 MILLION/UL (ref 3.88–5.62)
SODIUM SERPL-SCNC: 134 MMOL/L (ref 136–145)
TRIGL SERPL-MCNC: 76 MG/DL
WBC # BLD AUTO: 14.74 THOUSAND/UL (ref 4.31–10.16)

## 2020-08-05 PROCEDURE — 85027 COMPLETE CBC AUTOMATED: CPT | Performed by: NURSE PRACTITIONER

## 2020-08-05 PROCEDURE — 93926 LOWER EXTREMITY STUDY: CPT

## 2020-08-05 PROCEDURE — 99255 IP/OBS CONSLTJ NEW/EST HI 80: CPT | Performed by: PHYSICIAN ASSISTANT

## 2020-08-05 PROCEDURE — 80061 LIPID PANEL: CPT | Performed by: NURSE PRACTITIONER

## 2020-08-05 PROCEDURE — 84145 PROCALCITONIN (PCT): CPT | Performed by: NURSE PRACTITIONER

## 2020-08-05 PROCEDURE — 99255 IP/OBS CONSLTJ NEW/EST HI 80: CPT | Performed by: INTERNAL MEDICINE

## 2020-08-05 PROCEDURE — 83735 ASSAY OF MAGNESIUM: CPT | Performed by: NURSE PRACTITIONER

## 2020-08-05 PROCEDURE — 93922 UPR/L XTREMITY ART 2 LEVELS: CPT | Performed by: SURGERY

## 2020-08-05 PROCEDURE — 82948 REAGENT STRIP/BLOOD GLUCOSE: CPT

## 2020-08-05 PROCEDURE — 99233 SBSQ HOSP IP/OBS HIGH 50: CPT | Performed by: INTERNAL MEDICINE

## 2020-08-05 PROCEDURE — 93926 LOWER EXTREMITY STUDY: CPT | Performed by: SURGERY

## 2020-08-05 PROCEDURE — 80048 BASIC METABOLIC PNL TOTAL CA: CPT | Performed by: NURSE PRACTITIONER

## 2020-08-05 PROCEDURE — 83036 HEMOGLOBIN GLYCOSYLATED A1C: CPT | Performed by: NURSE PRACTITIONER

## 2020-08-05 RX ORDER — ASPIRIN 81 MG/1
81 TABLET, CHEWABLE ORAL DAILY
Status: DISCONTINUED | OUTPATIENT
Start: 2020-08-05 | End: 2020-08-12 | Stop reason: HOSPADM

## 2020-08-05 RX ORDER — HEPARIN SODIUM 5000 [USP'U]/ML
5000 INJECTION, SOLUTION INTRAVENOUS; SUBCUTANEOUS EVERY 8 HOURS SCHEDULED
Status: DISCONTINUED | OUTPATIENT
Start: 2020-08-05 | End: 2020-08-12 | Stop reason: HOSPADM

## 2020-08-05 RX ORDER — CEFAZOLIN SODIUM 2 G/50ML
2000 SOLUTION INTRAVENOUS EVERY 8 HOURS
Status: DISCONTINUED | OUTPATIENT
Start: 2020-08-05 | End: 2020-08-07

## 2020-08-05 RX ADMIN — INSULIN LISPRO 1 UNITS: 100 INJECTION, SOLUTION INTRAVENOUS; SUBCUTANEOUS at 08:08

## 2020-08-05 RX ADMIN — HEPARIN SODIUM 5000 UNITS: 5000 INJECTION INTRAVENOUS; SUBCUTANEOUS at 21:33

## 2020-08-05 RX ADMIN — CEFAZOLIN SODIUM 2000 MG: 2 SOLUTION INTRAVENOUS at 18:55

## 2020-08-05 RX ADMIN — METRONIDAZOLE 500 MG: 500 INJECTION, SOLUTION INTRAVENOUS at 18:14

## 2020-08-05 RX ADMIN — INSULIN LISPRO 8 UNITS: 100 INJECTION, SOLUTION INTRAVENOUS; SUBCUTANEOUS at 18:14

## 2020-08-05 RX ADMIN — SODIUM CHLORIDE 75 ML/HR: 0.9 INJECTION, SOLUTION INTRAVENOUS at 15:20

## 2020-08-05 RX ADMIN — Medication 250 MG: at 18:13

## 2020-08-05 RX ADMIN — HEPARIN SODIUM 5000 UNITS: 5000 INJECTION INTRAVENOUS; SUBCUTANEOUS at 15:22

## 2020-08-05 RX ADMIN — INSULIN GLARGINE 80 UNITS: 100 INJECTION, SOLUTION SUBCUTANEOUS at 21:33

## 2020-08-05 RX ADMIN — VANCOMYCIN HYDROCHLORIDE 1750 MG: 1 INJECTION, POWDER, LYOPHILIZED, FOR SOLUTION INTRAVENOUS at 03:48

## 2020-08-05 RX ADMIN — INSULIN LISPRO 1 UNITS: 100 INJECTION, SOLUTION INTRAVENOUS; SUBCUTANEOUS at 12:02

## 2020-08-05 RX ADMIN — INSULIN LISPRO 8 UNITS: 100 INJECTION, SOLUTION INTRAVENOUS; SUBCUTANEOUS at 12:03

## 2020-08-05 RX ADMIN — CEFEPIME HYDROCHLORIDE 1000 MG: 1 INJECTION, POWDER, FOR SOLUTION INTRAMUSCULAR; INTRAVENOUS at 03:11

## 2020-08-05 RX ADMIN — METRONIDAZOLE 500 MG: 500 INJECTION, SOLUTION INTRAVENOUS at 10:15

## 2020-08-05 RX ADMIN — VERAPAMIL HYDROCHLORIDE 240 MG: 120 TABLET, FILM COATED, EXTENDED RELEASE ORAL at 10:14

## 2020-08-05 RX ADMIN — Medication 250 MG: at 10:14

## 2020-08-05 RX ADMIN — INSULIN LISPRO 2 UNITS: 100 INJECTION, SOLUTION INTRAVENOUS; SUBCUTANEOUS at 18:13

## 2020-08-05 RX ADMIN — METRONIDAZOLE 500 MG: 500 INJECTION, SOLUTION INTRAVENOUS at 01:30

## 2020-08-05 RX ADMIN — ASPIRIN 81 MG 81 MG: 81 TABLET ORAL at 12:02

## 2020-08-05 RX ADMIN — CEFAZOLIN SODIUM 2000 MG: 2 SOLUTION INTRAVENOUS at 12:05

## 2020-08-05 RX ADMIN — PANTOPRAZOLE SODIUM 40 MG: 40 TABLET, DELAYED RELEASE ORAL at 05:50

## 2020-08-05 RX ADMIN — INSULIN LISPRO 8 UNITS: 100 INJECTION, SOLUTION INTRAVENOUS; SUBCUTANEOUS at 08:09

## 2020-08-05 NOTE — PROGRESS NOTES
Vancomycin Assessment    Selma Carter is a 60 y o  male who is currently receiving vancomycin vancomycin 1500 mg IV q12h for skin-soft tissue infection     Relevant clinical data and objective history reviewed:  Creatinine   Date Value Ref Range Status   08/04/2020 1 36 (H) 0 60 - 1 30 mg/dL Final     Comment:     Standardized to IDMS reference method   07/31/2020 1 37 (H) 0 60 - 1 30 mg/dL Final     Comment:     Standardized to IDMS reference method   02/06/2020 0 85 0 60 - 1 30 mg/dL Final     Comment:     Standardized to IDMS reference method     /86   Pulse 98   Temp 99 7 °F (37 6 °C) (Oral)   Resp 20   Ht 6' 4"   Wt 113 kg (249 lb 1 9 oz)   SpO2 96%   BMI 30 32 kg/m²   I/O last 3 completed shifts:  In: -   Out: 300 [Urine:300]  Lab Results   Component Value Date/Time    BUN 33 (H) 08/04/2020 01:58 PM    WBC 17 43 (H) 08/04/2020 01:58 PM    HGB 14 1 08/04/2020 01:58 PM    HCT 41 0 08/04/2020 01:58 PM    MCV 91 08/04/2020 01:58 PM     08/04/2020 01:58 PM     Temp Readings from Last 3 Encounters:   08/04/20 99 7 °F (37 6 °C) (Oral)   07/06/20 99 5 °F (37 5 °C)   03/19/20 98 2 °F (36 8 °C)     Vancomycin Days of Therapy: 1    Assessment/Plan  The patient is currently on vancomycin utilizing scheduled dosing  Baseline risks associated with therapy include:  none   The patient is receiving vancomycin 1500 mg IV q12h with the most recent vancomycin level being not at steady-state and sub-therapeutic based on a goal of 15-20 (appropriate for most indications) ; therefore, after clinical evaluation will be changed to vancomycin 1750 mg IV q12h   Pharmacy will continue to follow closely for s/sx of nephrotoxicity, infusion reactions, and appropriateness of therapy  BMP and CBC will be ordered per protocol  Plan for trough as patient approaches steady state, prior to the 5th  dose at approximately 8/6/20 1430   Pharmacy will continue to follow the patients culture results and clinical progress daily      Gwen Mills, Pharmacist

## 2020-08-05 NOTE — ASSESSMENT & PLAN NOTE
POA, as evidenced by leukocytosis, tachycardia, lactic acidosis, with source of infection right great toe gangrene with cellulitis  Lactic acid 3 1 in ED  Normalized after receiving 2000 mL NS in ED    IV antibiotic as above  Check procalcitonin  Blood cultures x2 pending  Will stop IV fluid now

## 2020-08-05 NOTE — ASSESSMENT & PLAN NOTE
-resolved  -creatinine 1 36/GFR 57 on admission  -creatinine down to 0 92/GFR 92 this a m   -continue hydration  -continue supportive care per primary medical service

## 2020-08-05 NOTE — CONSULTS
Consult- Do Leija 1965, 60 y o  male MRN: [id number]    Unit/Bed#: -01 Encounter: [id number]    Primary Care Provider: Colton Chino MD   Date and time admitted to hospital: 8/4/2020  1:33 PM      Inpatient consult to Vascular Surgery  Consult performed by: Leigh Nick PA-C  Consult ordered by: NAILA Michael          PAD (peripheral artery disease) [facility])  Assessment & Plan  64-year-old male nonsmoker with uncontrolled DM (HgbA1C 10), HTN, HLD, thyroid CA, GERD and R hallux fx 2 months ago admitted with worsening R hallux gangrene with cellulitis and associated sepsis  Vascular surgery consulted for optimization of wound healing    Diagnostics:  -ALISA:  Pending  -MRI right foot 8/4/2020:  Osteomyelitis of the 1st distal phalanx    Plan:  -nonhealing right hallux wound with gangrene, OM and sepsis with nonpalpable distal pulses  Formal ALISA pending but ultimately requires R ALISA angiogram with possible intervention for restoration of blood flow and optimization of wound healing  Podiatry recommending hallux intervention  -IR consulted  Will plan on angiogram this week as IR schedule allows   -await results of ALISA  -recommend ASA and statin therapy if not contraindicated  -continue local wound care per Podiatry  Will require podiatric intervention after revascularization  -continue antibiotics per ID  On Flagyl, Maxipime, Vanco   Wound culture +DP C, GNR  Blood cultures pending  Continue to follow up cultures  -will discuss with Dr Olga Linares  Thank you for allowing us to participate in the care of this patient      * Gangrene of toe of right foot ([address] )  Assessment & Plan  -local wound care per Podiatry  -will ultimately require podiatric intervention after vascular workup complete  -awaiting ALISA  -continue antibiotics  -blood cultures:  Pending  -wound culture:  2+ DPC, 1+ GNR    Acute kidney injury ([address] )  Assessment & Plan  -resolved  -creatinine 1 36/GFR 57 on admission  -creatinine down to 0 92/GFR 92 this a m   -continue hydration  -continue supportive care per primary medical service    Severe sepsis [facility])  Assessment & Plan  R hallux gangrene with OM and sepsis on admission  -nontoxic in hemodynamics stable  -continue antibiotics per ID  -continue supportive care per primary medical service    Type 2 diabetes mellitus with hyperglycemia, with long-term current use of insulin [facility])  Assessment & Plan  Lab Results   Component Value Date    HGBA1C 9 7 (H) 08/05/2020       Recent Labs     08/04/20  1634 08/04/20  2139 08/05/20  0737   POCGLU 296* 299* 176*       Blood Sugar Average: Last 72 hrs:  (P) 257   -uncontrolled  -continue to optimize BS control for optimization of wound healing and prevention of SSI  -management per SLIM    Dyslipidemia, goal LDL below 100  Assessment & Plan  -stable  -continue statin therapy  -management per SLIM    Hypertension, essential  Assessment & Plan  -BP well controlled  -continue current medical regimen  -management per primary medical service        Consulting Service: SL    Chief Complaint:  Worsening right hallux wound with drainage and cellulitis    HPI: Livia Wright is a 60 y o  male nonsmoker with uncontrolled DM (HgbA1C 10), HTN, HLD, thyroid CA and GERD admitted with worsening R hallux gangrene with cellulitis and associated sepsis  Patient reports fracturing his right hallux 2 months ago with protrusion of bone through the nail bed  This was locally resected in the podiatrist office and he underwent wound checks weekly x8 weeks but not seen by by the podiatrist in over 2 weeks secondary to office vacation  One week ago, the patient developed malodorous discharge from the foot and was started on Augmentin  Patient now presents the emergency room with worsening right hallux wound, leukocytosis, lactate 3 0 and fever  Lactate normalized this a m  And hemodynamics stable  Patient nontoxic    MRI right foot with osteomyelitis of the 1st distal phalanx  Plain radiograph without subcutaneous scab No vascular imaging for review  Vascular surgery consulted for optimization of wound healing  Diagnostics:  -ALISA:  Pending  -MRI right foot 8/4/2020:  Osteomyelitis of the 1st distal phalanx  -x-ray right foot 8/4/2020:  No subcutaneous gas, acute fracture or lytic or blastic osseous lesions  Review of Systems:  General: negative  Cardiovascular: no chest pain or dyspnea on exertion  Respiratory: no cough, shortness of breath, or wheezing  Gastrointestinal: no abdominal pain, change in bowel habits, or black or bloody stools  Genitourinary ROS: no dysuria, trouble voiding, or hematuria  Musculoskeletal ROS:  As per the above HPI  Neurological ROS: no TIA or stroke symptoms  Hematological and Lymphatic ROS: negative  Dermatological ROS: Right hallux wound    As per the above HPI  Psychological ROS: negative  Ophthalmic ROS: negative  ENT ROS: negative    Past Medical History:  Past Medical History:   Diagnosis Date    Diabetes mellitus ([facility] )     Hyperlipidemia     Hypertension     Thyroid cancer ([facility] )        Past Surgical History:  Past Surgical History:   Procedure Laterality Date    FOOT SURGERY Right 2014    US GUIDED THYROID BIOPSY  7/2/2020       Social History:  Social History     Substance and Sexual Activity   Alcohol Use Not Currently    Alcohol/week: 8 0 standard drinks    Types: 8 Cans of beer per week    Comment: last drink was 10 days ago     Social History     Substance and Sexual Activity   Drug Use No     Social History     Tobacco Use   Smoking Status Never Smoker   Smokeless Tobacco Never Used       Family History:  Family History   Problem Relation Age of Onset    Cancer Mother     Hypertension Father        Allergies:  No Known Allergies    Medications:  Current Facility-Administered Medications   Medication Dose Route Frequency    acetaminophen (TYLENOL) tablet 650 mg  650 mg Oral Q6H PRN    cefepime (MAXIPIME) 1,000 mg in dextrose 5 % 50 mL IVPB  1,000 mg Intravenous Q12H    hydrOXYzine HCL (ATARAX) tablet 25 mg  25 mg Oral Q6H PRN    insulin glargine (LANTUS) subcutaneous injection 80 Units 0 8 mL  80 Units Subcutaneous HS    insulin lispro (HumaLOG) 100 units/mL subcutaneous injection 1-5 Units  1-5 Units Subcutaneous TID AC    insulin lispro (HumaLOG) 100 units/mL subcutaneous injection 1-5 Units  1-5 Units Subcutaneous HS    insulin lispro (HumaLOG) 100 units/mL subcutaneous injection 8 Units  8 Units Subcutaneous TID With Meals    metroNIDAZOLE (FLAGYL) IVPB (premix) 500 mg 100 mL  500 mg Intravenous Q8H    ondansetron (ZOFRAN) injection 4 mg  4 mg Intravenous Q6H PRN    pantoprazole (PROTONIX) EC tablet 40 mg  40 mg Oral Early Morning    saccharomyces boulardii (FLORASTOR) capsule 250 mg  250 mg Oral BID    sodium chloride 0 9 % infusion  75 mL/hr Intravenous Continuous    vancomycin (VANCOCIN) 1,750 mg in sodium chloride 0 9 % 500 mL IVPB  17 5 mg/kg (Adjusted) Intravenous Q12H    verapamil (CALAN-SR) CR tablet 240 mg  240 mg Oral Daily       Vitals:  /89 (BP Location: Right arm)   Pulse 76   Temp 100 1 °F (37 8 °C) (Oral)   Resp 17   Ht 6' 4"   Wt 113 kg (249 lb 1 9 oz)   SpO2 96%   BMI 30 32 kg/m²     I/Os:  I/O last 24 hours:   In: 120 [P O :120]  Out: 1250 [Urine:1250]    Lab Results and Cultures:   Lab Results   Component Value Date    WBC 14 74 (H) 08/05/2020    HGB 11 6 (L) 08/05/2020    HCT 34 5 (L) 08/05/2020    MCV 91 08/05/2020     08/05/2020     Lab Results   Component Value Date    CALCIUM 8 2 (L) 08/05/2020    K 3 7 08/05/2020    CO2 26 08/05/2020     08/05/2020    BUN 23 08/05/2020    CREATININE 0 92 08/05/2020     Lab Results   Component Value Date    INR 1 04 08/04/2020    INR 0 99 11/15/2018    PROTIME 13 8 08/04/2020    PROTIME 13 0 11/15/2018       Lipid Panel: No results found for: CHOL,     Blood Culture:   Lab Results   Component Value Date    BLOODCX Received in Microbiology Lab  Culture in Progress  08/04/2020   ,   Urinalysis:   Lab Results   Component Value Date    COLORU Yellow 09/27/2019    CLARITYU Clear 09/27/2019    SPECGRAV 1 020 09/27/2019    PHUR 5 5 09/27/2019    PHUR 6 0 11/15/2018    LEUKOCYTESUR (A) 09/27/2019     Elevated glucose may cause decreased leukocyte values  See urine microscopic for [facility] result/    NITRITE Negative 09/27/2019    GLUCOSEU >=1000 (1%) (A) 09/27/2019    KETONESU Negative 09/27/2019    BILIRUBINUR Negative 09/27/2019    BLOODU Moderate (A) 09/27/2019   ,   Urine Culture: No results found for: URINECX,   Wound Culure: No results found for: WOUNDCULT    Imaging:  Imaging studies reviewed and as described above  MRI right foot 8/4/2020:  Osteomyelitis of 1st distal phalanx  ALISA 8/5/2020:  Pending    Physical Exam:    General appearance: alert and oriented, in no acute distress  Skin: Skin color, texture, turgor normal  No rashes or lesions  Neurologic: Grossly normal  Head: Normocephalic, without obvious abnormality, atraumatic  Eyes: PERRL, EOMI, sclerae nonicteric  Throat: lips, mucosa, and tongue normal; teeth and gums normal  Neck: no adenopathy, no carotid bruit, no JVD, supple, symmetrical, trachea midline and thyroid not enlarged, symmetric, no tenderness/mass/nodules  Back: symmetric, no curvature  ROM normal  No CVA tenderness  Lungs: clear to auscultation bilaterally  Chest wall: no tenderness  Heart: regular rate and rhythm, S1, S2 normal, no murmur, click, rub or gallop  Abdomen: soft, non-tender; bowel sounds normal; no masses,  no organomegaly and No abdominal bruits  Nondistended  No pulsatile mass appreciated  Extremities: Right foot dressing in place  Bilateral lower extremities warm, pink, motor and sensory intact without cyanosis, pallor, crepitus, erythema or rubor  See Clinical images below  Dry gangrene right hallux      Wound/Incision:  Gangrene entire right hallux with purulent drainage at base of toe  No crepitus  Resolving erythema  See Clinical images below      Right hallux        Pulse exam:  Radial: Right: 2+ Left[de-identified] 2+  Femoral: Right: 2+ Left: 2+  Popliteal: Right: 2+ Left: 2+  DP: Right: doppler signal and non-palpable Left: 2+  PT: Right: doppler signal and non-palpable Left: doppler signal and non-palpable  Doppler signals:  Right:  Biphasic PT, DP, AT, monophasic peroneal     Buddy Cortés PA-C  8/5/2020  [facility], [phone]

## 2020-08-05 NOTE — ASSESSMENT & PLAN NOTE
57-year-old male nonsmoker with uncontrolled DM (HgbA1C 10), HTN, HLD, thyroid CA and GERD admitted with worsening are hallux gangrene with cellulitis and associated sepsis  Vascular surgery consulted for optimization of wound healing    Diagnostics:  -ALISA:  R popliteal occlusion  R SHANNAN 07/83/63; L SHANNAN 1 3/181/132  -MRI right foot 8/4/2020:  Osteomyelitis of the 1st distal phalanx  -x-ray right foot 8/4/2020:  No subcutaneous gas or lytic lesions    Plan:  -R popliteal occlusion  Dr Lathan Sicard will place NPO order and we will schedule angiogram today  Discussed with patient, he is in agreement and will proceed with angiogram to the RLE    [facility] entered room while discussing procedure and she is aware and in agreement  D/w Dr Nicol Mccoy as well  Cr 0 92/GFR 92  INR 1 04 this admission  -will give IVF for angiogram today  -continue local wound care per Podiatry  Will require podiatric intervention after revascularization  -continue antibiotics per ID  On Flagyl, Maxipime, Vanco   Wound culture +DP C, GNR  BC no growth at 24 hrs     Continue to follow up cultures  -discussed with Dr Nicol Mccoy  Thank you for allowing us to participate in the care of this patient

## 2020-08-05 NOTE — ASSESSMENT & PLAN NOTE
Gangrene of right great toe and hallux with cellulitis  Reports foul-smelling discharge from the toe for 1 week and progressively worsening discoloration to right great toe  Started on Augmentin on Thursday last week by podiatrist   X-ray right foot showed possible osteomyelitis  MRI foot Osteomyelitis of the distal portion of the first distal phalanx  Check arterial duplex  Check wound culture  Patient received IV Vanco, cefepime in ED  Evaluated by ID and antibiotic changed to IV Ancef and Flagyl  Patient seen by podiatry, appreciate input  Patient will need surgery later this week  Recommend vascular consult     Patient wants surgery to be done by Dr Scott Temple, consult placed

## 2020-08-05 NOTE — ASSESSMENT & PLAN NOTE
Creatinine 1 36  Baseline creatinine around 0 8  Improved with IV hydration      Results from last 7 days   Lab Units 08/05/20  0506 08/04/20  1358 07/31/20  1026   BUN mg/dL 23 33* 38*   CREATININE mg/dL 0 92 1 36* 1 37*

## 2020-08-05 NOTE — ASSESSMENT & PLAN NOTE
-local wound care per Podiatry  -will ultimately require podiatric intervention after vascular workup complete  -awaiting ALISA  -continue antibiotics  -blood cultures:  Pending  -wound culture:  2+ DPC, 1+ GNR

## 2020-08-05 NOTE — PLAN OF CARE
Problem: Potential for Falls  Goal: Patient will remain free of falls  Description: INTERVENTIONS:  - Assess patient frequently for physical needs  -  Identify cognitive and physical deficits and behaviors that affect risk of falls    -  [place] fall precautions as indicated by assessment   - Educate patient/family on patient safety including physical limitations  - Instruct patient to call for assistance with activity based on assessment  - Modify environment to reduce risk of injury  - Consider OT/PT consult to assist with strengthening/mobility  Outcome: Progressing     Problem: PAIN - ADULT  Goal: Verbalizes/displays adequate comfort level or baseline comfort level  Description: Interventions:  - Encourage patient to monitor pain and request assistance  - Assess pain using appropriate pain scale  - Administer analgesics based on type and severity of pain and evaluate response  - Implement non-pharmacological measures as appropriate and evaluate response  - Consider cultural and social influences on pain and pain management  - Notify physician/advanced practitioner if interventions unsuccessful or patient reports new pain  Outcome: Progressing     Problem: INFECTION - ADULT  Goal: Absence or prevention of progression during hospitalization  Description: INTERVENTIONS:  - Assess and monitor for signs and symptoms of infection  - Monitor lab/diagnostic results  - Monitor all insertion sites, i e  indwelling lines, tubes, and drains  - Monitor endotracheal if appropriate and nasal secretions for changes in amount and color  - [place] appropriate cooling/warming therapies per order  - Administer medications as ordered  - Instruct and encourage patient and family to use good hand hygiene technique  - Identify and instruct in appropriate isolation precautions for identified infection/condition  Outcome: Progressing  Goal: Absence of fever/infection during neutropenic period  Description: INTERVENTIONS:  - Monitor WBC    Outcome: Progressing     Problem: SAFETY ADULT  Goal: Patient will remain free of falls  Description: INTERVENTIONS:  - Assess patient frequently for physical needs  -  Identify cognitive and physical deficits and behaviors that affect risk of falls    -  [place] fall precautions as indicated by assessment   - Educate patient/family on patient safety including physical limitations  - Instruct patient to call for assistance with activity based on assessment  - Modify environment to reduce risk of injury  - Consider OT/PT consult to assist with strengthening/mobility  Outcome: Progressing  Goal: Maintain or return to baseline ADL function  Description: INTERVENTIONS:  -  Assess patient's ability to carry out ADLs; assess patient's baseline for ADL function and identify physical deficits which impact ability to perform ADLs (bathing, care of mouth/teeth, toileting, grooming, dressing, etc )  - Assess/evaluate cause of self-care deficits   - Assess range of motion  - Assess patient's mobility; develop plan if impaired  - Assess patient's need for assistive devices and provide as appropriate  - Encourage maximum independence but intervene and supervise when necessary  - Involve family in performance of ADLs  - Assess for home care needs following discharge   - Consider OT consult to assist with ADL evaluation and planning for discharge  - Provide patient education as appropriate  Outcome: Progressing  Goal: Maintain or return mobility status to optimal level  Description: INTERVENTIONS:  - Assess patient's baseline mobility status (ambulation, transfers, stairs, etc )    - Identify cognitive and physical deficits and behaviors that affect mobility  - Identify mobility aids required to assist with transfers and/or ambulation (gait belt, sit-to-stand, lift, walker, cane, etc )  - [place] fall precautions as indicated by assessment  - Record patient progress and toleration of activity level on Mobility SBAR; progress patient to next Phase/Stage  - Instruct patient to call for assistance with activity based on assessment  - Consider rehabilitation consult to assist with strengthening/weightbearing, etc   Outcome: Progressing     Problem: DISCHARGE PLANNING  Goal: Discharge to home or other facility with appropriate resources  Description: INTERVENTIONS:  - Identify barriers to discharge w/patient and caregiver  - Arrange for needed discharge resources and transportation as appropriate  - Identify discharge learning needs (meds, wound care, etc )  - Arrange for interpretive services to assist at discharge as needed  - Refer to Case Management Department for coordinating discharge planning if the patient needs post-hospital services based on physician/advanced practitioner order or complex needs related to functional status, cognitive ability, or social support system  Outcome: Progressing     Problem: Knowledge Deficit  Goal: Patient/family/caregiver demonstrates understanding of disease process, treatment plan, medications, and discharge instructions  Description: Complete learning assessment and assess knowledge base  Interventions:  - Provide teaching at level of understanding  - Provide teaching via preferred learning methods  Outcome: Progressing     Problem: Nutrition/Hydration-ADULT  Goal: Nutrient/Hydration intake appropriate for improving, restoring or maintaining nutritional needs  Description: Monitor and assess patient's nutrition/hydration status for malnutrition  Collaborate with interdisciplinary team and initiate plan and interventions as ordered  Monitor patient's weight and dietary intake as ordered or per policy  Utilize nutrition screening tool and intervene as necessary  Determine patient's food preferences and provide high-protein, high-caloric foods as appropriate       INTERVENTIONS:  - Monitor oral intake, urinary output, labs, and treatment plans  - Assess nutrition and hydration status and recommend course of action  - Evaluate amount of meals eaten  - Assist patient with eating if necessary   - Allow adequate time for meals  - Recommend/ encourage appropriate diets, oral nutritional supplements, and vitamin/mineral supplements  - Order, calculate, and assess calorie counts as needed  - Recommend, monitor, and adjust tube feedings based on assessed needs  - Assess need for intravenous fluids  - Provide nutrition/hydration education as appropriate  - Include patient/family/caregiver in decisions related to nutrition  Outcome: Progressing

## 2020-08-05 NOTE — CONSULTS
Consultation - Infectious Disease   Montana Adamson 60 y o  male MRN: [id number]  Unit/Bed#: -01 Encounter: [id number]      IMPRESSION & RECOMMENDATIONS:   1  Sepsis, POA  Patient presented on admission with leukocytosis and tachycardia  Source of infection is problem 2  No other obvious sources on exam   Patient is fortunately hemodynamically stable  Continue antibiotics as below  Continue to trend fever curve/vitals  Repeat CBC/chemistry tomorrow  Follow up pending culture data  Additional supportive care as per primary      2  Gangrene of right foot great toe with osteomyelitis  Patient has had clinical progression of a previous injury approximately 8 weeks ago where he reports that the bone was exposed at that time  MRI confirms osteomyelitis  Exam consistent with severe necrosis/tissue death and suspected super infection with foul-smelling drainage  Patient with minimal hospital/antibiotic exposure  Patient is likely to go on to amputation  Will narrow antibiotics to Ancef with Flagyl  Continue to trend fever curve/vitals  Repeat CBC/chemistry tomorrow  Follow up pending culture data  Ongoing evaluations by vascular surgery  Podiatry evaluation appreciated  Additional supportive care as per primary  Duration of therapy pending clinical course    3  Acute kidney injury  Acute elevation in creatinine noted from baseline  Suspect that this was due to acute illness  Has since down trended  Impacts antibiotic dosing  Will continue Ancef high-dose for now  Continue to monitor chemistry closely  Will further dose adjust antibiotic as needed    4  Poorly controlled diabetes with neuropathy and vascular disease  Patient noted with hemoglobin A1c of 10  He also has underlying neuropathy as well as peripheral vascular disease  Unfortunately these contribute to poor wound healing and infection as above    Ongoing glucose management as per primary  Additional workup ongoing by vascular surgery  Antibiotics as above      5  Obesity  Patient noted with BMI of 30  Can impact antibiotic efficacy/dosing  Parker Falk higher dosing of Ancef at this time  Recommend discussion of weight loss as outpatient      6  Thyroid cancer  Patient recently diagnosed with thyroid cancer in July  Currently not on any chemotherapy  Recommend follow-up with oncology/endocrinology as outpatient  Above plan discussed in detail with the patient at bedside  Primary service updated of the above plan  ID consult service will continue to follow  HISTORY OF PRESENT ILLNESS:  Reason for Consult:  Sepsis and toe gangrene    HPI: Klever Schaefer is a 60y o  year old male with diabetes, hyperlipidemia, hypertension and thyroid cancer  Reportedly the patient had a fracture in his right great toe approximately 2 months ago  He had been following up with Podiatry closely  Recently however he noticed foul-smelling discharge coming from the right great toe about a week ago with progressively worsening discoloration  At that time he was prescribed Augmentin  He had been taking it for a little under a week  He had no other localizing symptoms  He reported pain in the toe  Patient had previous surgery on the right foot 4 years ago  White count on admission was 17 4  Creatinine 1 36  LFTs unremarkable  Patient met sepsis criteria on admission and was started on antibiotics  Patient was seen by Podiatry in the ER  Podiatry exam appeared consistent with dry gangrene  Patient was recommended for MRI along with vascular evaluation and there is high likelihood of 1st metatarsal head amputation  No acute events were noted overnight on chart review  Low-grade temperatures noted yesterday  White blood cell count down trending to 14 7  Wound cultures pending  Blood cultures pending  MRI with osteomyelitis of the 1st distal phalanx  Initial x-ray also with questionable changes  Patient's other vitals have been stable  He has not been seen by our service previously  No significant data in Care everywhere including cultures  Labs otherwise unremarkable  No significant culture data in our system  We are consulted at this time for further assistance in management  On evaluation, patient currently denies having any nausea, vomiting, chest pain or shortness of breath  We reviewed the progression of his foot wound  Patient is aware of the need for amputation at this time  He reports that he was diagnosed with thyroid cancer in the beginning of July has yet to be started on treatment  He recalls his previous amputation of the 5th metatarsal 4 years ago  Otherwise he has had minimal to no hospital exposure and no history of drug-resistant infection  Patient confirms he has no allergies to antibiotics  REVIEW OF SYSTEMS:  A complete 12 point system-based review of systems is negative other than that noted in the HPI  PAST MEDICAL HISTORY:  Past Medical History:   Diagnosis Date    Diabetes mellitus ([facility] )     Hyperlipidemia     Hypertension     Thyroid cancer ([facility] )      Past Surgical History:   Procedure Laterality Date    FOOT SURGERY Right 2014    US GUIDED THYROID BIOPSY  2020       FAMILY HISTORY:  Non-contributory    SOCIAL HISTORY:  Social History   Social History     Substance and Sexual Activity   Alcohol Use Not Currently    Alcohol/week: 8 0 standard drinks    Types: 8 Cans of beer per week    Comment: last drink was 10 days ago     Social History     Substance and Sexual Activity   Drug Use No     Social History     Tobacco Use   Smoking Status Never Smoker   Smokeless Tobacco Never Used       ALLERGIES:  No Known Allergies    MEDICATIONS:  All current active medications have been reviewed      PHYSICAL EXAM:  Temp:  [98 °F (36 7 °C)-100 2 °F (37 9 °C)] 100 1 °F (37 8 °C)  HR:  [] 76  Resp:  [15-20] 17  BP: (118-155)/(63-89) 143/89  SpO2:  [96 %-100 %] 96 %  Temp (24hrs), Av 5 °F (37 5 °C), Min:98 °F (36 7 °C), Max:100 2 °F (37 9 °C)  Current: Temperature: 100 1 °F (37 8 °C)    Intake/Output Summary (Last 24 hours) at 8/5/2020 1104  Last data filed at 8/5/2020 0857  Gross per 24 hour   Intake 120 ml   Output 1250 ml   Net -1130 ml       General Appearance:  Appearing well, nontoxic, and in no distress   Head:  Normocephalic, without obvious abnormality, atraumatic   Eyes:  Conjunctiva pink and sclera anicteric, both eyes   Nose: Nares normal, mucosa normal, no drainage   Throat: Deferred as patient is wearing a mask   Neck: Supple, symmetrical, no adenopathy, no tenderness/mass/nodules   Back:   Symmetric, no curvature, ROM normal, no CVA tenderness; no spinal or paraspinal muscle tenderness to palpation  Lungs:   Clear to auscultation bilaterally, respirations unlabored on room   Chest Wall:  No tenderness or deformity   Heart:  RRR; no murmur, rub or gallop appreciated   Abdomen:   Soft, non-tender, non-distended, positive bowel sounds    Extremities: No cyanosis, clubbing or edema   Skin: Patient's toe evaluated and there are large portions of dry gangrene with significant foul smell  Hyperemia noted at the tissue borders  Some mild purulent drainage noted  Clinical images reviewed  Lymph nodes: Cervical, supraclavicular nodes normal   Neurologic: Alert and oriented times 3, extremity strength 5/5 and symmetric       LABS, IMAGING, & OTHER STUDIES:  Lab Results:  I have personally reviewed pertinent labs    Results from last 7 days   Lab Units 08/05/20  0506 08/04/20  1358   WBC Thousand/uL 14 74* 17 43*   HEMOGLOBIN g/dL 11 6* 14 1   PLATELETS Thousands/uL 297 331     Results from last 7 days   Lab Units 08/05/20  0506 08/04/20  1358 07/31/20  1026   POTASSIUM mmol/L 3 7 4 7 4 5   CHLORIDE mmol/L 100 93* 96*   CO2 mmol/L 26 23 26   BUN mg/dL 23 33* 38*   CREATININE mg/dL 0 92 1 36* 1 37*   EGFR ml/min/1 73sq m 92 57 57   CALCIUM mg/dL 8 2* 9 3 9 5   AST U/L  --  31 39   ALT U/L  --  39 59   ALK PHOS U/L  --  95 86     Results from last 7 days   Lab Units 08/04/20  1837 08/04/20  1410 08/04/20  1358   BLOOD CULTURE   --  Received in Microbiology Lab  Culture in Progress  Received in Microbiology Lab  Culture in Progress  GRAM STAIN RESULT  2+ Gram positive cocci in pairs and chains*  1+ Gram negative rods*  No polys seen*  --   --        Imaging Studies:   I have personally reviewed pertinent imaging study reports and images in PACS  Other Studies:   I have personally reviewed pertinent reports

## 2020-08-05 NOTE — ASSESSMENT & PLAN NOTE
Lab Results   Component Value Date    HGBA1C 9 7 (H) 08/05/2020       Recent Labs     08/04/20  1634 08/04/20  2139 08/05/20  0737   POCGLU 296* 299* 176*       Blood Sugar Average: Last 72 hrs:  (P) 257   -uncontrolled  -continue to optimize BS control for optimization of wound healing and prevention of SSI  -management per SLIM

## 2020-08-05 NOTE — ASSESSMENT & PLAN NOTE
47-year-old male nonsmoker with uncontrolled DM (HgbA1C 10), HTN, HLD, thyroid CA and GERD admitted with worsening are hallux gangrene with cellulitis and associated sepsis  Vascular surgery consulted for optimization of wound healing    Diagnostics:  -ALISA:  Pending  -MRI right foot 8/4/2020:  Osteomyelitis of the 1st distal phalanx    Plan:  -await results of ALISA  -continue local wound care per Podiatry  Will require podiatric intervention after revascularization  -continue antibiotics per ID  On Flagyl, Maxipime, Vanco   Wound culture +DP C, GNR  Blood cultures pending  Continue to follow up cultures  -will discuss with Dr Sol Serrano  Thank you for allowing us to participate in the care of this patient

## 2020-08-05 NOTE — UTILIZATION REVIEW
Initial Clinical Review    Admission: Date/Time/Statement:   Admission Orders (From admission, onward)     Ordered        08/04/20 1453  Inpatient Admission (expected length of stay for this patient Order details is greater than two midnights)  Once                   Orders Placed This Encounter   Procedures    Inpatient Admission (expected length of stay for this patient Order details is greater than two midnights)     Standing Status:   Standing     Number of Occurrences:   1     Order Specific Question:   Admitting Physician     AnswerPoli Love [[id number]]     Order Specific Question:   Level of Care     Answer:   Med Surg [16]     Order Specific Question:   Estimated length of stay     Answer:   More than 2 Midnights     Order Specific Question:   Certification     Answer:   I certify that inpatient services are medically necessary for this patient for a duration of greater than two midnights  See H&P and MD Progress Notes for additional information about the patient's course of treatment  ED Arrival Information     Expected Arrival Acuity Means of Arrival Escorted By Service Admission Type    - 8/4/2020 13:29 Urgent Walk-In Family Member General Medicine Urgent    Arrival Complaint    TOE PAIN        Chief Complaint   Patient presents with    Toe Pain     pt reports fracture to right great toe 2 months prior  pt now reports ulcers      Assessment/Plan: 59 yo male to ED from R great tow wound  fx to R great toe 2 months ago   Noticed foul smelling DC and progressive worsening discoloration to R great toe  Called podiatrist and prescribed Augmentin   Has been taking since last thurs  Reports chills , no fever  Monitors BS qd  Xray w/ possible OM   Admitted IP status for gangrene plan to check MRI , arterial duplex , wound cx  Plan to cont iv vanco , cefepime and add flagyl   Consult vascular and ID   Severe sepsis BC pending , cont IVF , check pct    MARKELL creat 1 36 baseline 0 8 suspect sec to infection , hold avalide , gentle IVF , repeat labs   8/5 Vascular consult   PAD ALISA pending , MRI right foot 8/4/2020:  Osteomyelitis of the 1st distal phalanx  Non healing R hallux wound w/ gangrene   IR consulted plan on angiogram , await results of ALISA   Recommend ASA and statin   Cont local wound care podiatry   Cont abx per ID on flagyl , maxipime , vanco , wound cx + DP C , GNR       8/5 ID consult   Sepsis cont abx , fever , cx pending   Gangrene podiatry following   MRI confirms OM   MARKELL cont ancef , cont to monitor   Poorly controlled diabetes w/ neuropathy ongoing glucose per primary   8/5 IM note   Severe sepsis source of infection right great toe gangrene with cellulitis  Cont IV abx , stop IVF   Pt will need sx later this week  X-ray right foot showed possible osteomyelitis  MRI foot Osteomyelitis of the distal portion of the first distal phalanx    Check arterial duplex  ED Triage Vitals [08/04/20 1336]   Temperature Pulse Respirations Blood Pressure SpO2   98 °F (36 7 °C) (!) 106 16 118/63 98 %      Temp Source Heart Rate Source Patient Position - Orthostatic VS BP Location FiO2 (%)   Oral Monitor Lying Right arm --      Pain Score       3          Wt Readings from Last 1 Encounters:   08/04/20 113 kg (249 lb 1 9 oz)     Additional Vital Signs:   08/05/20 0809                     None (Room air)       08/05/20 07:51:55   100 1 °F (37 8 °C)   76   17   143/89   107   96 %   None (Room air)   Lying    08/05/20 00:40:08   99 4 °F (37 4 °C)         124/66   85             08/04/20 1944   99 7 °F (37 6 °C)               96 %   None (Room air)       08/04/20 16:05:48            131/86   101             08/04/20 1605   100 2 °F (37 9 °C)   98   20   131/86   101   97 %   None (Room air)   Lying    08/04/20 1502      100   15   155/78      98 %   None (Room air)       08/04/20 1500      102      138/76   95   97 %          08/04/20 1427      104   15   144/78      100 %   None (Room air)       08/04/20 1404      103   18   150/85      98 %   None (Room air         Pertinent Labs/Diagnostic Test Results:   8/4 R foot xray Patchy, mottled lucency 1st distal phalanx with questionable diminished conspicuity of the cortical margins  Uncertain if this is related to regional osteopenia due to reported recent injury versus osteomyelitis  If there is clinical suspicion for   infection, either short interval plain film follow-up versus MRI may be considered    8/4 MRI foot    Osteomyelitis of the distal portion of the first distal phalanx  8/5 VAS arterial duplex- pending   Results from last 7 days   Lab Units 08/05/20  0506 08/04/20  1358   WBC Thousand/uL 14 74* 17 43*   HEMOGLOBIN g/dL 11 6* 14 1   HEMATOCRIT % 34 5* 41 0   PLATELETS Thousands/uL 297 331   NEUTROS ABS Thousands/µL  --  13 03*     Results from last 7 days   Lab Units 08/05/20  0506 08/04/20  1358 07/31/20  1026   SODIUM mmol/L 134* 129* 133*   POTASSIUM mmol/L 3 7 4 7 4 5   CHLORIDE mmol/L 100 93* 96*   CO2 mmol/L 26 23 26   ANION GAP mmol/L 8 13 11   BUN mg/dL 23 33* 38*   CREATININE mg/dL 0 92 1 36* 1 37*   EGFR ml/min/1 73sq m 92 57 57   CALCIUM mg/dL 8 2* 9 3 9 5   MAGNESIUM mg/dL 1 7  --   --      Results from last 7 days   Lab Units 08/04/20  1358 07/31/20  1026   AST U/L 31 39   ALT U/L 39 59   ALK PHOS U/L 95 86   TOTAL PROTEIN g/dL 8 5* 8 4*   ALBUMIN g/dL 3 1* 3 4*   TOTAL BILIRUBIN mg/dL 1 20* 1 10*   BILIRUBIN DIRECT mg/dL 0 17  --      Results from last 7 days   Lab Units 08/05/20  1103 08/05/20  0737 08/04/20  2139 08/04/20  1634   POC GLUCOSE mg/dl 159* 176* 299* 296*     Results from last 7 days   Lab Units 08/05/20  0506 08/04/20  1358   GLUCOSE RANDOM mg/dL 223* 365*     Results from last 7 days   Lab Units 08/05/20  0506 07/31/20  1026   HEMOGLOBIN A1C % 9 7* 10 0*   EAG mg/dl 232 240     Results from last 7 days   Lab Units 08/04/20  1358   TROPONIN I ng/mL <0 02     Results from last 7 days   Lab Units 08/04/20  1358   PROTIME seconds 13 8   INR  1 04   PTT seconds 29     Results from last 7 days   Lab Units 07/31/20  1026   TSH 3RD GENERATON uIU/mL 0 160*     Results from last 7 days   Lab Units 08/05/20  0506   PROCALCITONIN ng/ml 0 13     Results from last 7 days   Lab Units 08/04/20  1709 08/04/20  1358   LACTIC ACID mmol/L 1 9 3 1*     Results from last 7 days   Lab Units 08/04/20  1837 08/04/20  1410 08/04/20  1358   BLOOD CULTURE   --  Received in Microbiology Lab  Culture in Progress  Received in Microbiology Lab  Culture in Progress     GRAM STAIN RESULT  2+ Gram positive cocci in pairs and chains*  1+ Gram negative rods*  No polys seen*  --   --      ED Treatment:   Medication Administration from 08/04/2020 1329 to 08/04/2020 1556       Date/Time Order Dose Route Action     08/04/2020 1359 sodium chloride 0 9 % bolus 1,000 mL 1,000 mL Intravenous New Bag     08/04/2020 1425 LORazepam (ATIVAN) injection 1 mg 1 mg Intravenous Given     08/04/2020 1518 vancomycin (VANCOCIN) 1,750 mg in sodium chloride 0 9 % 500 mL IVPB 1,750 mg Intravenous New Bag     08/04/2020 1501 cefepime (MAXIPIME) 2,000 mg in dextrose 5 % 50 mL IVPB 2,000 mg Intravenous New Bag     08/04/2020 1501 sodium chloride 0 9 % bolus 1,000 mL 1,000 mL Intravenous New Bag        Past Medical History:   Diagnosis Date    Diabetes mellitus ([place] [address] )     Hyperlipidemia     Hypertension     Thyroid cancer ([place] [address] )      Present on Admission:   Hypertension, essential   Gastroesophageal reflux disease without esophagitis   Dyslipidemia, goal LDL below 100   Obesity, Class I, BMI 30 0-34 9 (see actual BMI)   Multiple thyroid nodules      Admitting Diagnosis: Diabetes ([place] [address] ) [E11 9]  Toe pain [M79 676]  Gangrenous toe ([place] [address] ) [I96]  Sepsis ([place] [address] ) [A41 9]  Age/Sex: 60 y o  male  Admission Orders:  Scheduled Medications:  aspirin, 81 mg, Oral, Daily  cefazolin, 2,000 mg, Intravenous, Q8H  insulin glargine, 80 Units, Subcutaneous, HS  insulin lispro, 1-5 Units, Subcutaneous, TID AC  insulin lispro, 1-5 Units, Subcutaneous, HS  insulin lispro, 8 Units, Subcutaneous, TID With Meals  metroNIDAZOLE, 500 mg, Intravenous, Q8H  pantoprazole, 40 mg, Oral, Early Morning  saccharomyces boulardii, 250 mg, Oral, BID  verapamil, 240 mg, Oral, Daily      Continuous IV Infusions:  sodium chloride, 75 mL/hr, Intravenous, Continuous      PRN Meds:  acetaminophen, 650 mg, Oral, Q6H PRN  hydrOXYzine HCL, 25 mg, Oral, Q6H PRN  ondansetron, 4 mg, Intravenous, Q6H PRN    Fingerstick ac and hs   Wound care   NPO to cardiac diet    IP CONSULT TO PHARMACY  IP CONSULT TO INFECTIOUS DISEASES  IP CONSULT TO VASCULAR SURGERY    Network Utilization Review Department  [email or web address]  ATTENTION: Please call with any questions or concerns to [phone] and carefully listen to the prompts so that you are directed to the right person  All voicemails are confidential   Harlene Pair all requests for admission clinical reviews, approved or denied determinations and any other requests to dedicated fax number below belonging to the campus where the patient is receiving treatment   List of dedicated fax numbers for the Facilities:  [address] [place] [address] DENIALS (Administrative/Medical Necessity) [phone]   [address] (Maternity/NICU/Pediatrics) [phone]   Sue Perdomo [phone]   Kathy Eddie [phone]   Toy Edison [phone]   [facility] [phone]   [address] [place] [address] [address] [place] [address] [phone]   [facility]  [phone]   [address] [facility]  [address] [place] [address] [address] [facility] [phone]

## 2020-08-05 NOTE — ASSESSMENT & PLAN NOTE
Lab Results   Component Value Date    HGBA1C 9 7 (H) 08/05/2020       Recent Labs     08/04/20  1634 08/04/20  2139 08/05/20  0737 08/05/20  1103   POCGLU 296* 299* 176* 159*       Blood Sugar Average: Last 72 hrs:  (P) 232 5   Poorly controlled type 2 diabetes  On Lantus 80 units HS and Apidra 40 units t i d  With meals at home  Will continue Lantus    Decrease mealtime insulin to 8 units t i d   SSI  Diabetic diet

## 2020-08-05 NOTE — PROGRESS NOTES
Vancomycin IV Pharmacy-to-Dose Consultation    Gilmar Hickman is a 60 y o  male who is currently receiving Vancomycin IV with management by the Pharmacy Consult service  Assessment/Plan:  The patient was reviewed  Renal function is stable and no signs or symptoms of nephrotoxicity and/or infusion reactions were documented in the chart  Based on todays assessment, continue current vancomycin (day # 2) dosing of 1750 mg IV q12h, with a plan for trough to be drawn 30 min before dose due at 1530 tomorrow 8-6-2020  We will continue to follow the patients culture results and clinical progress daily      Jay George, Pharmacist

## 2020-08-05 NOTE — ASSESSMENT & PLAN NOTE
R hallux gangrene with OM and sepsis on admission  -nontoxic in hemodynamics stable  -continue antibiotics per ID  -continue supportive care per primary medical service

## 2020-08-05 NOTE — PROGRESS NOTES
Progress Note - Renetta Cabrera 1965, 60 y o  male MRN: [id number]    Unit/Bed#: -01 Encounter: [id number]    Primary Care Provider: Malgorzata Calle MD   Date and time admitted to hospital: 8/4/2020  1:33 PM        PAD (peripheral artery disease) [facility])  Assessment & Plan  Follow vascular surgery recommendations    Hyponatremia  Assessment & Plan  Pseudohyponatremia due to hyperglycemia  Resolved    Acute kidney injury ([address] )  Assessment & Plan  Creatinine 1 36  Baseline creatinine around 0 8  Improved with IV hydration  Results from last 7 days   Lab Units 08/05/20  0506 08/04/20  1358 07/31/20  1026   BUN mg/dL 23 33* 38*   CREATININE mg/dL 0 92 1 36* 1 37*       Severe sepsis (HCC)  Assessment & Plan  POA, as evidenced by leukocytosis, tachycardia, lactic acidosis, with source of infection right great toe gangrene with cellulitis  Lactic acid 3 1 in ED  Normalized after receiving 2000 mL NS in ED  IV antibiotic as above  Check procalcitonin  Blood cultures x2 pending  Will stop IV fluid now    Multiple thyroid nodules  Assessment & Plan  Status post thyroid biopsy  Outpatient follow-up with PCP    Type 2 diabetes mellitus with hyperglycemia, with long-term current use of insulin [facility])  Assessment & Plan  Lab Results   Component Value Date    HGBA1C 9 7 (H) 08/05/2020       Recent Labs     08/04/20  1634 08/04/20  2139 08/05/20  0737 08/05/20  1103   POCGLU 296* 299* 176* 159*       Blood Sugar Average: Last 72 hrs:  (P) 232 5   Poorly controlled type 2 diabetes  On Lantus 80 units HS and Apidra 40 units t i d  With meals at home  Will continue Lantus  Decrease mealtime insulin to 8 units t i d   SSI  Diabetic diet    Obesity, Class I, BMI 30 0-34 9 (see actual BMI)  Assessment & Plan  Body mass index is 30 32 kg/m²    Diet and lifestyle modification    Dyslipidemia, goal LDL below 100  Assessment & Plan  Not on medication at home  Check lipid panel    Gastroesophageal reflux disease without esophagitis  Assessment & Plan  Continue PPI    Hypertension, essential  Assessment & Plan  BP acceptable  Hold Avalide  Continue verapamil  Monitor BP    * Gangrene of toe of right foot (HCC)  Assessment & Plan  Gangrene of right great toe and hallux with cellulitis  Reports foul-smelling discharge from the toe for 1 week and progressively worsening discoloration to right great toe  Started on Augmentin on Thursday last week by podiatrist   X-ray right foot showed possible osteomyelitis  MRI foot Osteomyelitis of the distal portion of the first distal phalanx  Check arterial duplex  Check wound culture  Patient received IV Vanco, cefepime in ED  Evaluated by ID and antibiotic changed to IV Ancef and Flagyl  Patient seen by podiatry, appreciate input  Patient will need surgery later this week  Recommend vascular consult  Patient wants surgery to be done by Dr Mey Courtney, consult placed          VTE Pharmacologic Prophylaxis:   Pharmacologic: Heparin  Mechanical VTE Prophylaxis in Place: Yes    Patient Centered Rounds: I have performed bedside rounds with nursing staff today  Discussions with Specialists or Other Care Team Provider:  Podiatry    Education and Discussions with Family / Patient:  Patient    Time Spent for Care: More than 50% of total time spent on counseling and coordination of care as described above  Current Length of Stay: 1 day(s)    Current Patient Status: Inpatient   Certification Statement: The patient will continue to require additional inpatient hospital stay due to See above     Discharge Plan:  72 hours    Code Status: No Order      Subjective:   I have seen and examined the patient bedside this morning  Patient complains about foul smelling right great toe  Low-grade fever overnight    No chest pain or shortness of breath    Objective:     Vitals:   Temp (24hrs), Av 9 °F (37 7 °C), Min:99 4 °F (37 4 °C), Max:100 2 °F (37 9 °C)    Temp:  [99 4 °F (37 4 °C)-100 2 °F (37 9 °C)] 100 1 °F (37 8 °C)  HR:  [] 76  Resp:  [15-20] 17  BP: (124-155)/(66-89) 143/89  SpO2:  [96 %-98 %] 96 %  Body mass index is 30 32 kg/m²  Input and Output Summary (last 24 hours):        Intake/Output Summary (Last 24 hours) at 8/5/2020 1501  Last data filed at 8/5/2020 1205  Gross per 24 hour   Intake 120 ml   Output 1575 ml   Net -1455 ml       Physical Exam:     Physical Exam  General- Awake, alert and oriented x 3, looks comfortable  HEENT- Normocephalic, atraumatic  Neck- Supple  CVS- Normal S1/ S2, Regular rate and rhythm  Respiratory system- B/L clear breath sounds, no wheezing  Abdomen- Soft, Non distended, no tenderness, Bowel sound- present  Musculoskeletal- right great toe gangrenous  CNS- No acute focal neurologic deficit noted    Additional Data:     Labs:    Results from last 7 days   Lab Units 08/05/20  0506 08/04/20  1358   WBC Thousand/uL 14 74* 17 43*   HEMOGLOBIN g/dL 11 6* 14 1   HEMATOCRIT % 34 5* 41 0   PLATELETS Thousands/uL 297 331   NEUTROS PCT %  --  75   LYMPHS PCT %  --  13*   MONOS PCT %  --  11   EOS PCT %  --  0     Results from last 7 days   Lab Units 08/05/20  0506 08/04/20  1358   SODIUM mmol/L 134* 129*   POTASSIUM mmol/L 3 7 4 7   CHLORIDE mmol/L 100 93*   CO2 mmol/L 26 23   BUN mg/dL 23 33*   CREATININE mg/dL 0 92 1 36*   ANION GAP mmol/L 8 13   CALCIUM mg/dL 8 2* 9 3   ALBUMIN g/dL  --  3 1*   TOTAL BILIRUBIN mg/dL  --  1 20*   ALK PHOS U/L  --  95   ALT U/L  --  39   AST U/L  --  31   GLUCOSE RANDOM mg/dL 223* 365*     Results from last 7 days   Lab Units 08/04/20  1358   INR  1 04     Results from last 7 days   Lab Units 08/05/20  1103 08/05/20  0737 08/04/20  2139 08/04/20  1634   POC GLUCOSE mg/dl 159* 176* 299* 296*     Results from last 7 days   Lab Units 08/05/20  0506 07/31/20  1026   HEMOGLOBIN A1C % 9 7* 10 0*     Results from last 7 days   Lab Units 08/05/20  0506 08/04/20  1709 08/04/20  1358   LACTIC ACID mmol/L  --  1 9 3 1*   PROCALCITONIN ng/ml 0 13  --   --            * I Have Reviewed All Lab Data Listed Above  * Additional Pertinent Lab Tests Reviewed: All Labs Within Last 24 Hours Reviewed    Imaging:    Imaging Reports Reviewed Today Include:   MRI right foot  Osteomyelitis of the distal portion of the first distal phalanx  Imaging Personally Reviewed by Myself Includes:      Recent Cultures (last 7 days):     Results from last 7 days   Lab Units 08/04/20  1837 08/04/20  1410 08/04/20  1358   BLOOD CULTURE   --  Received in Microbiology Lab  Culture in Progress  Received in Microbiology Lab  Culture in Progress     GRAM STAIN RESULT  2+ Gram positive cocci in pairs and chains*  1+ Gram negative rods*  No polys seen*  --   --    WOUND CULTURE  Culture too young- will reincubate  --   --        Last 24 Hours Medication List:   acetaminophen, 650 mg, Oral, Q6H PRN, Reniakycee Issarik, CRNP  aspirin, 81 mg, Oral, Daily, Octavia Davis PA-C  cefazolin, 2,000 mg, Intravenous, Q8H, Glo Silver MD, Last Rate: 2,000 mg (08/05/20 1205)  hydrOXYzine HCL, 25 mg, Oral, Q6H PRN, NAILA Bach  insulin glargine, 80 Units, Subcutaneous, HS, Renikaycee Issarik, CRNP  insulin lispro, 1-5 Units, Subcutaneous, TID AC, Renikaycee Issarik, CRNP  insulin lispro, 1-5 Units, Subcutaneous, HS, Reniyin Yurik, CRNP  insulin lispro, 8 Units, Subcutaneous, TID With Meals, Dariela Issarik, CRNP  metroNIDAZOLE, 500 mg, Intravenous, Q8H, Cuiyin Yurik, CRNP, Last Rate: 500 mg (08/05/20 1015)  ondansetron, 4 mg, Intravenous, Q6H PRN, Reniyin Maeganrik, CRNP  pantoprazole, 40 mg, Oral, Early Morning, Cuiyin Yurik, CRNP  saccharomyces boulardii, 250 mg, Oral, BID, Cuiyin Yurik, CRNP  sodium chloride, 75 mL/hr, Intravenous, Continuous, Cuiyin Yurik, CRNP, Last Rate: 75 mL/hr (08/04/20 1832)  verapamil, 240 mg, Oral, Daily, NAILA Suarez         Today, Patient Was Seen By: Gabriela Castaneda MD    ** Please Note: Dictation voice to text software may have been used in the creation of this document   **

## 2020-08-06 ENCOUNTER — APPOINTMENT (INPATIENT)
Dept: INTERVENTIONAL RADIOLOGY/VASCULAR | Facility: HOSPITAL | Age: 55
DRG: 854 | End: 2020-08-06
Payer: COMMERCIAL

## 2020-08-06 LAB
ANION GAP SERPL CALCULATED.3IONS-SCNC: 7 MMOL/L (ref 4–13)
BASOPHILS # BLD AUTO: 0.07 THOUSANDS/ΜL (ref 0–0.1)
BASOPHILS NFR BLD AUTO: 1 % (ref 0–1)
BUN SERPL-MCNC: 12 MG/DL (ref 5–25)
CALCIUM SERPL-MCNC: 8.4 MG/DL (ref 8.3–10.1)
CHLORIDE SERPL-SCNC: 102 MMOL/L (ref 100–108)
CO2 SERPL-SCNC: 27 MMOL/L (ref 21–32)
CREAT SERPL-MCNC: 0.92 MG/DL (ref 0.6–1.3)
EOSINOPHIL # BLD AUTO: 0.14 THOUSAND/ΜL (ref 0–0.61)
EOSINOPHIL NFR BLD AUTO: 1 % (ref 0–6)
ERYTHROCYTE [DISTWIDTH] IN BLOOD BY AUTOMATED COUNT: 11.3 % (ref 11.6–15.1)
GFR SERPL CREATININE-BSD FRML MDRD: 92 ML/MIN/1.73SQ M
GLUCOSE SERPL-MCNC: 121 MG/DL (ref 65–140)
GLUCOSE SERPL-MCNC: 125 MG/DL (ref 65–140)
GLUCOSE SERPL-MCNC: 132 MG/DL (ref 65–140)
GLUCOSE SERPL-MCNC: 157 MG/DL (ref 65–140)
GLUCOSE SERPL-MCNC: 158 MG/DL (ref 65–140)
GLUCOSE SERPL-MCNC: 176 MG/DL (ref 65–140)
HCT VFR BLD AUTO: 34 % (ref 36.5–49.3)
HGB BLD-MCNC: 11.7 G/DL (ref 12–17)
IMM GRANULOCYTES # BLD AUTO: 0.11 THOUSAND/UL (ref 0–0.2)
IMM GRANULOCYTES NFR BLD AUTO: 1 % (ref 0–2)
LYMPHOCYTES # BLD AUTO: 2 THOUSANDS/ΜL (ref 0.6–4.47)
LYMPHOCYTES NFR BLD AUTO: 14 % (ref 14–44)
MCH RBC QN AUTO: 30.9 PG (ref 26.8–34.3)
MCHC RBC AUTO-ENTMCNC: 34.4 G/DL (ref 31.4–37.4)
MCV RBC AUTO: 90 FL (ref 82–98)
MONOCYTES # BLD AUTO: 1.4 THOUSAND/ΜL (ref 0.17–1.22)
MONOCYTES NFR BLD AUTO: 10 % (ref 4–12)
NEUTROPHILS # BLD AUTO: 10.3 THOUSANDS/ΜL (ref 1.85–7.62)
NEUTS SEG NFR BLD AUTO: 73 % (ref 43–75)
NRBC BLD AUTO-RTO: 0 /100 WBCS
PLATELET # BLD AUTO: 311 THOUSANDS/UL (ref 149–390)
PMV BLD AUTO: 10.8 FL (ref 8.9–12.7)
POTASSIUM SERPL-SCNC: 3.4 MMOL/L (ref 3.5–5.3)
RBC # BLD AUTO: 3.79 MILLION/UL (ref 3.88–5.62)
SODIUM SERPL-SCNC: 136 MMOL/L (ref 136–145)
WBC # BLD AUTO: 14.02 THOUSAND/UL (ref 4.31–10.16)

## 2020-08-06 PROCEDURE — 80048 BASIC METABOLIC PNL TOTAL CA: CPT | Performed by: INTERNAL MEDICINE

## 2020-08-06 PROCEDURE — 37226 PR REVASCULARIZE FEM/POP ARTERY,ANGIOPLASTY/STENT: CPT | Performed by: STUDENT IN AN ORGANIZED HEALTH CARE EDUCATION/TRAINING PROGRAM

## 2020-08-06 PROCEDURE — C1769 GUIDE WIRE: HCPCS

## 2020-08-06 PROCEDURE — 99153 MOD SED SAME PHYS/QHP EA: CPT

## 2020-08-06 PROCEDURE — 85025 COMPLETE CBC W/AUTO DIFF WBC: CPT | Performed by: INTERNAL MEDICINE

## 2020-08-06 PROCEDURE — 99232 SBSQ HOSP IP/OBS MODERATE 35: CPT | Performed by: INTERNAL MEDICINE

## 2020-08-06 PROCEDURE — 75625 CONTRAST EXAM ABDOMINL AORTA: CPT

## 2020-08-06 PROCEDURE — 75710 ARTERY X-RAYS ARM/LEG: CPT | Performed by: STUDENT IN AN ORGANIZED HEALTH CARE EDUCATION/TRAINING PROGRAM

## 2020-08-06 PROCEDURE — C1876 STENT, NON-COA/NON-COV W/DEL: HCPCS

## 2020-08-06 PROCEDURE — NC001 PR NO CHARGE: Performed by: STUDENT IN AN ORGANIZED HEALTH CARE EDUCATION/TRAINING PROGRAM

## 2020-08-06 PROCEDURE — C1725 CATH, TRANSLUMIN NON-LASER: HCPCS

## 2020-08-06 PROCEDURE — B41C1ZZ FLUOROSCOPY OF PELVIC ARTERIES USING LOW OSMOLAR CONTRAST: ICD-10-PCS | Performed by: STUDENT IN AN ORGANIZED HEALTH CARE EDUCATION/TRAINING PROGRAM

## 2020-08-06 PROCEDURE — 047M3DZ DILATION OF RIGHT POPLITEAL ARTERY WITH INTRALUMINAL DEVICE, PERCUTANEOUS APPROACH: ICD-10-PCS | Performed by: STUDENT IN AN ORGANIZED HEALTH CARE EDUCATION/TRAINING PROGRAM

## 2020-08-06 PROCEDURE — B4101ZZ FLUOROSCOPY OF ABDOMINAL AORTA USING LOW OSMOLAR CONTRAST: ICD-10-PCS | Performed by: STUDENT IN AN ORGANIZED HEALTH CARE EDUCATION/TRAINING PROGRAM

## 2020-08-06 PROCEDURE — C1893 INTRO/SHEATH, FIXED,NON-PEEL: HCPCS

## 2020-08-06 PROCEDURE — 99233 SBSQ HOSP IP/OBS HIGH 50: CPT | Performed by: NURSE PRACTITIONER

## 2020-08-06 PROCEDURE — 75710 ARTERY X-RAYS ARM/LEG: CPT

## 2020-08-06 PROCEDURE — B41F1ZZ FLUOROSCOPY OF RIGHT LOWER EXTREMITY ARTERIES USING LOW OSMOLAR CONTRAST: ICD-10-PCS | Performed by: STUDENT IN AN ORGANIZED HEALTH CARE EDUCATION/TRAINING PROGRAM

## 2020-08-06 PROCEDURE — C1887 CATHETER, GUIDING: HCPCS

## 2020-08-06 PROCEDURE — 75774 ARTERY X-RAY EACH VESSEL: CPT | Performed by: STUDENT IN AN ORGANIZED HEALTH CARE EDUCATION/TRAINING PROGRAM

## 2020-08-06 PROCEDURE — 37226 HB FEM/POPL REVASC W/STENT: CPT

## 2020-08-06 PROCEDURE — 76937 US GUIDE VASCULAR ACCESS: CPT | Performed by: STUDENT IN AN ORGANIZED HEALTH CARE EDUCATION/TRAINING PROGRAM

## 2020-08-06 PROCEDURE — 99152 MOD SED SAME PHYS/QHP 5/>YRS: CPT | Performed by: STUDENT IN AN ORGANIZED HEALTH CARE EDUCATION/TRAINING PROGRAM

## 2020-08-06 PROCEDURE — 82948 REAGENT STRIP/BLOOD GLUCOSE: CPT

## 2020-08-06 PROCEDURE — C1894 INTRO/SHEATH, NON-LASER: HCPCS

## 2020-08-06 PROCEDURE — 75625 CONTRAST EXAM ABDOMINL AORTA: CPT | Performed by: STUDENT IN AN ORGANIZED HEALTH CARE EDUCATION/TRAINING PROGRAM

## 2020-08-06 PROCEDURE — 99233 SBSQ HOSP IP/OBS HIGH 50: CPT | Performed by: INTERNAL MEDICINE

## 2020-08-06 PROCEDURE — 99152 MOD SED SAME PHYS/QHP 5/>YRS: CPT

## 2020-08-06 PROCEDURE — 36247 INS CATH ABD/L-EXT ART 3RD: CPT

## 2020-08-06 RX ORDER — DIPHENHYDRAMINE HYDROCHLORIDE 50 MG/ML
INJECTION INTRAMUSCULAR; INTRAVENOUS CODE/TRAUMA/SEDATION MEDICATION
Status: COMPLETED | OUTPATIENT
Start: 2020-08-06 | End: 2020-08-06

## 2020-08-06 RX ORDER — ATORVASTATIN CALCIUM 40 MG/1
40 TABLET, FILM COATED ORAL
Status: DISCONTINUED | OUTPATIENT
Start: 2020-08-06 | End: 2020-08-12 | Stop reason: HOSPADM

## 2020-08-06 RX ORDER — CLOPIDOGREL BISULFATE 75 MG/1
300 TABLET ORAL ONCE
Status: COMPLETED | OUTPATIENT
Start: 2020-08-06 | End: 2020-08-06

## 2020-08-06 RX ORDER — SODIUM CHLORIDE 9 MG/ML
75 INJECTION, SOLUTION INTRAVENOUS CONTINUOUS
Status: DISCONTINUED | OUTPATIENT
Start: 2020-08-06 | End: 2020-08-09

## 2020-08-06 RX ORDER — MIDAZOLAM HYDROCHLORIDE 2 MG/2ML
INJECTION, SOLUTION INTRAMUSCULAR; INTRAVENOUS CODE/TRAUMA/SEDATION MEDICATION
Status: COMPLETED | OUTPATIENT
Start: 2020-08-06 | End: 2020-08-06

## 2020-08-06 RX ORDER — HEPARIN SODIUM 1000 [USP'U]/ML
INJECTION, SOLUTION INTRAVENOUS; SUBCUTANEOUS CODE/TRAUMA/SEDATION MEDICATION
Status: COMPLETED | OUTPATIENT
Start: 2020-08-06 | End: 2020-08-06

## 2020-08-06 RX ORDER — FENTANYL CITRATE 50 UG/ML
INJECTION, SOLUTION INTRAMUSCULAR; INTRAVENOUS CODE/TRAUMA/SEDATION MEDICATION
Status: COMPLETED | OUTPATIENT
Start: 2020-08-06 | End: 2020-08-06

## 2020-08-06 RX ORDER — LIDOCAINE WITH 8.4% SOD BICARB 0.9%(10ML)
SYRINGE (ML) INJECTION CODE/TRAUMA/SEDATION MEDICATION
Status: COMPLETED | OUTPATIENT
Start: 2020-08-06 | End: 2020-08-06

## 2020-08-06 RX ADMIN — VERAPAMIL HYDROCHLORIDE 240 MG: 120 TABLET, FILM COATED, EXTENDED RELEASE ORAL at 09:05

## 2020-08-06 RX ADMIN — Medication 8 ML: at 16:24

## 2020-08-06 RX ADMIN — SODIUM CHLORIDE 75 ML/HR: 0.9 INJECTION, SOLUTION INTRAVENOUS at 05:27

## 2020-08-06 RX ADMIN — FENTANYL CITRATE 50 MCG: 50 INJECTION, SOLUTION INTRAMUSCULAR; INTRAVENOUS at 17:38

## 2020-08-06 RX ADMIN — METRONIDAZOLE 500 MG: 500 INJECTION, SOLUTION INTRAVENOUS at 18:42

## 2020-08-06 RX ADMIN — FENTANYL CITRATE 50 MCG: 50 INJECTION, SOLUTION INTRAMUSCULAR; INTRAVENOUS at 16:13

## 2020-08-06 RX ADMIN — FENTANYL CITRATE 50 MCG: 50 INJECTION, SOLUTION INTRAMUSCULAR; INTRAVENOUS at 16:27

## 2020-08-06 RX ADMIN — FENTANYL CITRATE 50 MCG: 50 INJECTION, SOLUTION INTRAMUSCULAR; INTRAVENOUS at 16:40

## 2020-08-06 RX ADMIN — HEPARIN SODIUM 5000 UNITS: 5000 INJECTION INTRAVENOUS; SUBCUTANEOUS at 05:28

## 2020-08-06 RX ADMIN — MIDAZOLAM HYDROCHLORIDE 1 MG: 1 INJECTION, SOLUTION INTRAMUSCULAR; INTRAVENOUS at 17:38

## 2020-08-06 RX ADMIN — Medication 250 MG: at 18:40

## 2020-08-06 RX ADMIN — CEFAZOLIN SODIUM 2000 MG: 2 SOLUTION INTRAVENOUS at 03:58

## 2020-08-06 RX ADMIN — INSULIN LISPRO 1 UNITS: 100 INJECTION, SOLUTION INTRAVENOUS; SUBCUTANEOUS at 11:41

## 2020-08-06 RX ADMIN — CEFAZOLIN SODIUM 2000 MG: 2 SOLUTION INTRAVENOUS at 11:38

## 2020-08-06 RX ADMIN — ASPIRIN 81 MG 81 MG: 81 TABLET ORAL at 09:05

## 2020-08-06 RX ADMIN — CLOPIDOGREL BISULFATE 300 MG: 75 TABLET ORAL at 22:02

## 2020-08-06 RX ADMIN — HEPARIN SODIUM 5000 UNITS: 5000 INJECTION INTRAVENOUS; SUBCUTANEOUS at 22:02

## 2020-08-06 RX ADMIN — MIDAZOLAM HYDROCHLORIDE 1 MG: 1 INJECTION, SOLUTION INTRAMUSCULAR; INTRAVENOUS at 16:25

## 2020-08-06 RX ADMIN — PANTOPRAZOLE SODIUM 40 MG: 40 TABLET, DELAYED RELEASE ORAL at 05:28

## 2020-08-06 RX ADMIN — HEPARIN SODIUM 5000 UNITS: 5000 INJECTION INTRAVENOUS; SUBCUTANEOUS at 15:44

## 2020-08-06 RX ADMIN — INSULIN GLARGINE 80 UNITS: 100 INJECTION, SOLUTION SUBCUTANEOUS at 22:02

## 2020-08-06 RX ADMIN — HEPARIN SODIUM 8000 UNITS: 1000 INJECTION INTRAVENOUS; SUBCUTANEOUS at 16:52

## 2020-08-06 RX ADMIN — DIPHENHYDRAMINE HYDROCHLORIDE 25 MG: 50 INJECTION, SOLUTION INTRAMUSCULAR; INTRAVENOUS at 16:13

## 2020-08-06 RX ADMIN — INSULIN LISPRO 1 UNITS: 100 INJECTION, SOLUTION INTRAVENOUS; SUBCUTANEOUS at 09:06

## 2020-08-06 RX ADMIN — CEFAZOLIN SODIUM 2000 MG: 2 SOLUTION INTRAVENOUS at 20:11

## 2020-08-06 RX ADMIN — MIDAZOLAM HYDROCHLORIDE 1 MG: 1 INJECTION, SOLUTION INTRAMUSCULAR; INTRAVENOUS at 16:40

## 2020-08-06 RX ADMIN — ATORVASTATIN CALCIUM 40 MG: 40 TABLET, FILM COATED ORAL at 18:40

## 2020-08-06 RX ADMIN — MIDAZOLAM HYDROCHLORIDE 1 MG: 1 INJECTION, SOLUTION INTRAMUSCULAR; INTRAVENOUS at 16:13

## 2020-08-06 RX ADMIN — Medication 250 MG: at 09:05

## 2020-08-06 RX ADMIN — INSULIN LISPRO 8 UNITS: 100 INJECTION, SOLUTION INTRAVENOUS; SUBCUTANEOUS at 09:07

## 2020-08-06 RX ADMIN — DIPHENHYDRAMINE HYDROCHLORIDE 25 MG: 50 INJECTION, SOLUTION INTRAMUSCULAR; INTRAVENOUS at 16:27

## 2020-08-06 RX ADMIN — METRONIDAZOLE 500 MG: 500 INJECTION, SOLUTION INTRAVENOUS at 03:25

## 2020-08-06 RX ADMIN — IODIXANOL 100 ML: 320 INJECTION, SOLUTION INTRAVASCULAR at 18:00

## 2020-08-06 RX ADMIN — METRONIDAZOLE 500 MG: 500 INJECTION, SOLUTION INTRAVENOUS at 09:37

## 2020-08-06 RX ADMIN — SODIUM CHLORIDE 75 ML/HR: 0.9 INJECTION, SOLUTION INTRAVENOUS at 22:36

## 2020-08-06 NOTE — PROGRESS NOTES
Patient transported to [facility] on monitor  Report given to [facility], care assumed  Assessed Left femoral puncture site with RN, site is clean, dry and intact with no signs or symptoms of bleeding or hematoma noted  doppler Distal pulses remain intact  VSS

## 2020-08-06 NOTE — PROGRESS NOTES
Interventional Radiology Preprocedure Note    History/Indication for procedure:   Mckinley Stoner is a 60 y o  male with a PMH of RLE CLTI manifested by a nonhealing R 1st toe ulcer who presents for RLE arteriography with intervention      Relevant past medical history:    Past Medical History:   Diagnosis Date    Diabetes mellitus ([facility] )     Hyperlipidemia     Hypertension     Thyroid cancer ([place] [address] )      Patient Active Problem List   Diagnosis    Hypertension, essential    Gastroesophageal reflux disease without esophagitis    Dyslipidemia, goal LDL below 100    History of nonadherence to medical treatment    Obesity, Class I, BMI 30 0-34 9 (see actual BMI)    Type 2 diabetes mellitus with hyperglycemia, with long-term current use of insulin (HCC)    Diabetes mellitus (HCC)    Type 2 diabetes mellitus with diabetic polyneuropathy, with long-term current use of insulin (HCC)    Vertigo    Right-sided tinnitus    Colon polyps    Elevated liver enzymes    Multiple thyroid nodules    Hyperthyroidism    Health maintenance examination    Gangrene of toe of right foot (HCC)    Severe sepsis (HCC)    Acute kidney injury ([place] [address] )    Hyponatremia    PAD (peripheral artery disease) (HCC)       /94   Pulse 76   Temp 98 9 °F (37 2 °C)   Resp 20   Ht 6' 4" (1 93 m)   Wt 113 kg (249 lb 1 9 oz)   SpO2 96%   BMI 30 32 kg/m²     Medications:    Inpatient Medications:     Scheduled Medications:  acetaminophen, 650 mg, Oral, Q6H PRN, NAILA Suarez  aspirin, 81 mg, Oral, Daily, Octavia Davis PA-C  atorvastatin, 40 mg, Oral, Daily With Dinner, NAILA Monte  cefazolin, 2,000 mg, Intravenous, Q8H, Lev Flores MD, Last Rate: 2,000 mg (08/06/20 1138)  heparin (porcine), 5,000 Units, Subcutaneous, Q8H [address], Maxine Couch MD  hydrOXYzine HCL, 25 mg, Oral, Q6H PRN, NAILA Riggs  insulin glargine, 80 Units, Subcutaneous, HS, NAILA Suarez  insulin lispro, 1-5 Units, Subcutaneous, TID AC, Dariela Dykes, NAILA  insulin lispro, 1-5 Units, Subcutaneous, HS, Dariela Issarik, CRNP  insulin lispro, 8 Units, Subcutaneous, TID With Meals, Dariela Dykes, NAILA  metroNIDAZOLE, 500 mg, Intravenous, Q8H, Renikaycee Issarik, CRNP, Last Rate: 500 mg (08/06/20 0937)  ondansetron, 4 mg, Intravenous, Q6H PRN, Dariela Issarijacob, CRJOSE L  pantoprazole, 40 mg, Oral, Early Morning, NAILA Suarez  saccharomyces boulardii, 250 mg, Oral, BID, Dariela Issarik, CRNP  sodium chloride, 75 mL/hr, Intravenous, Continuous, Dariela Issarijacob, CRNP, Last Rate: 75 mL/hr (08/06/20 0527)  sodium chloride, 75 mL/hr, Intravenous, Continuous, NAILA [place], Last Rate: 75 mL/hr (08/06/20 1138)  verapamil, 240 mg, Oral, Daily, NAILA Suarez        Infusions:  sodium chloride, 75 mL/hr, Last Rate: 75 mL/hr (08/06/20 0527)  sodium chloride, 75 mL/hr, Last Rate: 75 mL/hr (08/06/20 1138)        PRN:    acetaminophen    hydrOXYzine HCL    ondansetron    Outpatient Medications:  No current facility-administered medications on file prior to encounter        Current Outpatient Medications on File Prior to Encounter   Medication Sig Dispense Refill    amoxicillin-clavulanate (AUGMENTIN) 875-125 mg per tablet Take 1 tablet by mouth every 12 (twelve) hours      AVALIDE 300-12 5 MG per tablet Take 1 tablet by mouth daily 90 tablet 3    BD PEN NEEDLE DEV U/F 32G X 4 MM MISC Inject under the skin 4 (four) times a day 400 each 1    glucose blood test strip Check blood sugar daily 100 each 3    insulin glargine (Lantus SoloStar) 100 units/mL injection pen 80 u bedtime 9 pen 5    insulin glulisine (Apidra SoloStar) 100 units/mL injection pen Inject 40 Units under the skin 3 (three) times a day with meals 5 pen 6    omeprazole (PriLOSEC) 40 MG capsule TAKE 1 CAPSULE DAILY 90 capsule 3    verapamil (VERELAN PM) 360 MG 24 hr capsule Take 360 mg by mouth daily       Blood Glucose Monitoring Suppl (ONE TOUCH ULTRA 2) w/Device KIT by Does not apply route 2 (two) times a day 1 each 0       No Known Allergies    Anticoagulants: none    ASA classification: ASA 3 - Patient with moderate systemic disease with functional limitations    Airway Assessment: III (soft and hard palate and base of uvula visible)    Relevant family history: None    Relevant review of systems: None    Prior sedation/anesthesia: yes    Can the patient lie flat? Yes     Does patient have sleep apnea? No    If yes, does patient use CPAP? not applicable    NPO Status: yes    Labs:   CBC with diff:   Lab Results   Component Value Date    WBC 14 02 (H) 08/06/2020    HGB 11 7 (L) 08/06/2020    HCT 34 0 (L) 08/06/2020    MCV 90 08/06/2020     08/06/2020    MCH 30 9 08/06/2020    MCHC 34 4 08/06/2020    RDW 11 3 (L) 08/06/2020    MPV 10 8 08/06/2020    NRBC 0 08/06/2020     BMP/CMP:  Lab Results   Component Value Date    K 3 4 (L) 08/06/2020     08/06/2020    CO2 27 08/06/2020    BUN 12 08/06/2020    CREATININE 0 92 08/06/2020    CALCIUM 8 4 08/06/2020    AST 31 08/04/2020    ALT 39 08/04/2020    ALKPHOS 95 08/04/2020    EGFR 92 08/06/2020   ,     Coags:   Lab Results   Component Value Date    PTT 29 08/04/2020    INR 1 04 08/04/2020   ,   Results from last 7 days   Lab Units 08/04/20  1358   PTT seconds 29   INR  1 04        Relevant imaging studies:   Reviewed  Directed physical examination:  I agree with the physical exam performed on 8/5/20 and there are no additional changes  Assessment/Plan: For RLE arteriography with intervention  Sedation/Anesthesia plan: Moderate sedation will be used as needed for procedure      Consent with alternatives to the procedure, risks and benefits have been explained and discussed with the patient/patient's family: yes    During the informed consent process, the following was discussed, and the patient educated concerning Paclitaxel coated balloons and stents, which may be appropriate for the patient's up-coming angiogram procedure: Analysis of randomized trials suggest a possible increased death rate after two years in patients treated with paclitaxel-coated balloons and paclitaxel-eluting stents compared to patients treated with control devices (non-coated balloons or bare metal stents)  The specific cause for this observation is yet to be determined  Currently, the FDA believes that the benefits continue to outweigh the risks for approved paclitaxel-coated balloons and paclitaxel-eluting stents when used in accordance with their indications for use  The patient gave informed consent for the use of these devices if appropriately indicated for treatment

## 2020-08-06 NOTE — ASSESSMENT & PLAN NOTE
Lab Results   Component Value Date    HGBA1C 9 7 (H) 08/05/2020       Recent Labs     08/05/20  0737 08/05/20  1103 08/05/20  1558 08/06/20  0758   POCGLU 176* 159* 254* 157*       Blood Sugar Average: Last 72 hrs:  (P) 223 5   -uncontrolled  -continue to optimize BS control for optimization of wound healing and prevention of SSI  -management per SLIM

## 2020-08-06 NOTE — PLAN OF CARE
Problem: Potential for Falls  Goal: Patient will remain free of falls  Description: INTERVENTIONS:  - Assess patient frequently for physical needs  -  Identify cognitive and physical deficits and behaviors that affect risk of falls    -  [place] fall precautions as indicated by assessment   - Educate patient/family on patient safety including physical limitations  - Instruct patient to call for assistance with activity based on assessment  - Modify environment to reduce risk of injury  - Consider OT/PT consult to assist with strengthening/mobility  Outcome: Progressing     Problem: PAIN - ADULT  Goal: Verbalizes/displays adequate comfort level or baseline comfort level  Description: Interventions:  - Encourage patient to monitor pain and request assistance  - Assess pain using appropriate pain scale  - Administer analgesics based on type and severity of pain and evaluate response  - Implement non-pharmacological measures as appropriate and evaluate response  - Consider cultural and social influences on pain and pain management  - Notify physician/advanced practitioner if interventions unsuccessful or patient reports new pain  Outcome: Progressing     Problem: INFECTION - ADULT  Goal: Absence or prevention of progression during hospitalization  Description: INTERVENTIONS:  - Assess and monitor for signs and symptoms of infection  - Monitor lab/diagnostic results  - Monitor all insertion sites, i e  indwelling lines, tubes, and drains  - Monitor endotracheal if appropriate and nasal secretions for changes in amount and color  - [place] appropriate cooling/warming therapies per order  - Administer medications as ordered  - Instruct and encourage patient and family to use good hand hygiene technique  - Identify and instruct in appropriate isolation precautions for identified infection/condition  Outcome: Progressing  Goal: Absence of fever/infection during neutropenic period  Description: INTERVENTIONS:  - Monitor WBC    Outcome: Progressing     Problem: SAFETY ADULT  Goal: Patient will remain free of falls  Description: INTERVENTIONS:  - Assess patient frequently for physical needs  -  Identify cognitive and physical deficits and behaviors that affect risk of falls    -  [place] fall precautions as indicated by assessment   - Educate patient/family on patient safety including physical limitations  - Instruct patient to call for assistance with activity based on assessment  - Modify environment to reduce risk of injury  - Consider OT/PT consult to assist with strengthening/mobility  Outcome: Progressing  Goal: Maintain or return to baseline ADL function  Description: INTERVENTIONS:  -  Assess patient's ability to carry out ADLs; assess patient's baseline for ADL function and identify physical deficits which impact ability to perform ADLs (bathing, care of mouth/teeth, toileting, grooming, dressing, etc )  - Assess/evaluate cause of self-care deficits   - Assess range of motion  - Assess patient's mobility; develop plan if impaired  - Assess patient's need for assistive devices and provide as appropriate  - Encourage maximum independence but intervene and supervise when necessary  - Involve family in performance of ADLs  - Assess for home care needs following discharge   - Consider OT consult to assist with ADL evaluation and planning for discharge  - Provide patient education as appropriate  Outcome: Progressing  Goal: Maintain or return mobility status to optimal level  Description: INTERVENTIONS:  - Assess patient's baseline mobility status (ambulation, transfers, stairs, etc )    - Identify cognitive and physical deficits and behaviors that affect mobility  - Identify mobility aids required to assist with transfers and/or ambulation (gait belt, sit-to-stand, lift, walker, cane, etc )  - [place] fall precautions as indicated by assessment  - Record patient progress and toleration of activity level on Mobility SBAR; progress patient to next Phase/Stage  - Instruct patient to call for assistance with activity based on assessment  - Consider rehabilitation consult to assist with strengthening/weightbearing, etc   Outcome: Progressing     Problem: DISCHARGE PLANNING  Goal: Discharge to home or other facility with appropriate resources  Description: INTERVENTIONS:  - Identify barriers to discharge w/patient and caregiver  - Arrange for needed discharge resources and transportation as appropriate  - Identify discharge learning needs (meds, wound care, etc )  - Arrange for interpretive services to assist at discharge as needed  - Refer to Case Management Department for coordinating discharge planning if the patient needs post-hospital services based on physician/advanced practitioner order or complex needs related to functional status, cognitive ability, or social support system  Outcome: Progressing     Problem: Knowledge Deficit  Goal: Patient/family/caregiver demonstrates understanding of disease process, treatment plan, medications, and discharge instructions  Description: Complete learning assessment and assess knowledge base  Interventions:  - Provide teaching at level of understanding  - Provide teaching via preferred learning methods  Outcome: Progressing     Problem: Nutrition/Hydration-ADULT  Goal: Nutrient/Hydration intake appropriate for improving, restoring or maintaining nutritional needs  Description: Monitor and assess patient's nutrition/hydration status for malnutrition  Collaborate with interdisciplinary team and initiate plan and interventions as ordered  Monitor patient's weight and dietary intake as ordered or per policy  Utilize nutrition screening tool and intervene as necessary  Determine patient's food preferences and provide high-protein, high-caloric foods as appropriate       INTERVENTIONS:  - Monitor oral intake, urinary output, labs, and treatment plans  - Assess nutrition and hydration status and recommend course of action  - Evaluate amount of meals eaten  - Assist patient with eating if necessary   - Allow adequate time for meals  - Recommend/ encourage appropriate diets, oral nutritional supplements, and vitamin/mineral supplements  - Order, calculate, and assess calorie counts as needed  - Recommend, monitor, and adjust tube feedings based on assessed needs  - Assess need for intravenous fluids  - Provide nutrition/hydration education as appropriate  - Include patient/family/caregiver in decisions related to nutrition  Outcome: Progressing

## 2020-08-06 NOTE — PLAN OF CARE
Problem: Potential for Falls  Goal: Patient will remain free of falls  Description: INTERVENTIONS:  - Assess patient frequently for physical needs  -  Identify cognitive and physical deficits and behaviors that affect risk of falls    -  [place] fall precautions as indicated by assessment   - Educate patient/family on patient safety including physical limitations  - Instruct patient to call for assistance with activity based on assessment  - Modify environment to reduce risk of injury  - Consider OT/PT consult to assist with strengthening/mobility  Outcome: Progressing     Problem: PAIN - ADULT  Goal: Verbalizes/displays adequate comfort level or baseline comfort level  Description: Interventions:  - Encourage patient to monitor pain and request assistance  - Assess pain using appropriate pain scale  - Administer analgesics based on type and severity of pain and evaluate response  - Implement non-pharmacological measures as appropriate and evaluate response  - Consider cultural and social influences on pain and pain management  - Notify physician/advanced practitioner if interventions unsuccessful or patient reports new pain  Outcome: Progressing     Problem: INFECTION - ADULT  Goal: Absence or prevention of progression during hospitalization  Description: INTERVENTIONS:  - Assess and monitor for signs and symptoms of infection  - Monitor lab/diagnostic results  - Monitor all insertion sites, i e  indwelling lines, tubes, and drains  - Monitor endotracheal if appropriate and nasal secretions for changes in amount and color  - [place] appropriate cooling/warming therapies per order  - Administer medications as ordered  - Instruct and encourage patient and family to use good hand hygiene technique  - Identify and instruct in appropriate isolation precautions for identified infection/condition  Outcome: Progressing  Goal: Absence of fever/infection during neutropenic period  Description: INTERVENTIONS:  - Monitor WBC    Outcome: Progressing     Problem: SAFETY ADULT  Goal: Patient will remain free of falls  Description: INTERVENTIONS:  - Assess patient frequently for physical needs  -  Identify cognitive and physical deficits and behaviors that affect risk of falls    -  [place] fall precautions as indicated by assessment   - Educate patient/family on patient safety including physical limitations  - Instruct patient to call for assistance with activity based on assessment  - Modify environment to reduce risk of injury  - Consider OT/PT consult to assist with strengthening/mobility  Outcome: Progressing  Goal: Maintain or return to baseline ADL function  Description: INTERVENTIONS:  -  Assess patient's ability to carry out ADLs; assess patient's baseline for ADL function and identify physical deficits which impact ability to perform ADLs (bathing, care of mouth/teeth, toileting, grooming, dressing, etc )  - Assess/evaluate cause of self-care deficits   - Assess range of motion  - Assess patient's mobility; develop plan if impaired  - Assess patient's need for assistive devices and provide as appropriate  - Encourage maximum independence but intervene and supervise when necessary  - Involve family in performance of ADLs  - Assess for home care needs following discharge   - Consider OT consult to assist with ADL evaluation and planning for discharge  - Provide patient education as appropriate  Outcome: Progressing  Goal: Maintain or return mobility status to optimal level  Description: INTERVENTIONS:  - Assess patient's baseline mobility status (ambulation, transfers, stairs, etc )    - Identify cognitive and physical deficits and behaviors that affect mobility  - Identify mobility aids required to assist with transfers and/or ambulation (gait belt, sit-to-stand, lift, walker, cane, etc )  - [place] fall precautions as indicated by assessment  - Record patient progress and toleration of activity level on Mobility SBAR; progress patient to next Phase/Stage  - Instruct patient to call for assistance with activity based on assessment  - Consider rehabilitation consult to assist with strengthening/weightbearing, etc   Outcome: Progressing     Problem: DISCHARGE PLANNING  Goal: Discharge to home or other facility with appropriate resources  Description: INTERVENTIONS:  - Identify barriers to discharge w/patient and caregiver  - Arrange for needed discharge resources and transportation as appropriate  - Identify discharge learning needs (meds, wound care, etc )  - Arrange for interpretive services to assist at discharge as needed  - Refer to Case Management Department for coordinating discharge planning if the patient needs post-hospital services based on physician/advanced practitioner order or complex needs related to functional status, cognitive ability, or social support system  Outcome: Progressing     Problem: Knowledge Deficit  Goal: Patient/family/caregiver demonstrates understanding of disease process, treatment plan, medications, and discharge instructions  Description: Complete learning assessment and assess knowledge base  Interventions:  - Provide teaching at level of understanding  - Provide teaching via preferred learning methods  Outcome: Progressing     Problem: Nutrition/Hydration-ADULT  Goal: Nutrient/Hydration intake appropriate for improving, restoring or maintaining nutritional needs  Description: Monitor and assess patient's nutrition/hydration status for malnutrition  Collaborate with interdisciplinary team and initiate plan and interventions as ordered  Monitor patient's weight and dietary intake as ordered or per policy  Utilize nutrition screening tool and intervene as necessary  Determine patient's food preferences and provide high-protein, high-caloric foods as appropriate       INTERVENTIONS:  - Monitor oral intake, urinary output, labs, and treatment plans  - Assess nutrition and hydration status and recommend course of action  - Evaluate amount of meals eaten  - Assist patient with eating if necessary   - Allow adequate time for meals  - Recommend/ encourage appropriate diets, oral nutritional supplements, and vitamin/mineral supplements  - Order, calculate, and assess calorie counts as needed  - Recommend, monitor, and adjust tube feedings based on assessed needs  - Assess need for intravenous fluids  - Provide nutrition/hydration education as appropriate  - Include patient/family/caregiver in decisions related to nutrition  Outcome: Progressing

## 2020-08-06 NOTE — CONSULTS
Consultation - Janene Rowe 60 y o  male MRN: [id number]    Unit/Bed#: -01 Encounter: [id number]      Assessment/Plan     Assessment:  1) Gangrene to the right foot great toe  2) PAD  3) Uncontrolled DM  4) Osteomyelitis of the right great toe   5) Cellulitis right foot    Plan:  1) Patient to have Vascular procedure today  - if successful, will pursue right great toe amputation  2) Dressed with betadine and DSD  3) IV antibiotics as per Medicine and ID  4) Will follow     History of Present Illness     HPI: Janene Rowe is a 60y o  year old male who presents with gangrenous toe to the right foot  He states that he was following with outside Podiatrist who was debriding weekly and then it worsened        Consults    Review of Systems   No n/v/f/c/s  No hallucinations  No rashes  No earache  No diplopia  No headache  No SOB  No CP  No GERD  No dysuria  Positive thyroid CA  No sore throat     Historical Information   Past Medical History:   Diagnosis Date    Diabetes mellitus ([facility] )     Hyperlipidemia     Hypertension     Thyroid cancer ([facility] )      Past Surgical History:   Procedure Laterality Date    [facility]  [facility]     Social History   Social History     Substance and Sexual Activity   Alcohol Use Not Currently    Alcohol/week: 8 0 standard drinks    Types: 8 Cans of beer per week    Comment: last drink was 10 days ago     Social History     Substance and Sexual Activity   Drug Use No     Social History     Tobacco Use   Smoking Status Never Smoker   Smokeless Tobacco Never Used     E-Cigarette/Vaping    E-Cigarette Use Never User      E-Cigarette/Vaping Substances      Family History:   Family History   Problem Relation Age of Onset    Cancer Mother     Hypertension Father        Meds/Allergies   all current active meds have been reviewed, current meds:   Current Facility-Administered Medications   Medication Dose Route Frequency    acetaminophen (TYLENOL) tablet 650 mg  650 mg Oral Q6H PRN    aspirin chewable tablet 81 mg  81 mg Oral Daily    atorvastatin (LIPITOR) tablet 40 mg  40 mg Oral Daily With Dinner    ceFAZolin (ANCEF) IVPB (premix) 2,000 mg 50 mL  2,000 mg Intravenous Q8H    heparin (porcine) subcutaneous injection 5,000 Units  5,000 Units Subcutaneous Q8H [facility]    hydrOXYzine HCL (ATARAX) tablet 25 mg  25 mg Oral Q6H PRN    insulin glargine (LANTUS) subcutaneous injection 80 Units 0 8 mL  80 Units Subcutaneous HS    insulin lispro (HumaLOG) 100 units/mL subcutaneous injection 1-5 Units  1-5 Units Subcutaneous TID AC    insulin lispro (HumaLOG) 100 units/mL subcutaneous injection 1-5 Units  1-5 Units Subcutaneous HS    insulin lispro (HumaLOG) 100 units/mL subcutaneous injection 8 Units  8 Units Subcutaneous TID With Meals    metroNIDAZOLE (FLAGYL) IVPB (premix) 500 mg 100 mL  500 mg Intravenous Q8H    ondansetron (ZOFRAN) injection 4 mg  4 mg Intravenous Q6H PRN    pantoprazole (PROTONIX) EC tablet 40 mg  40 mg Oral Early Morning    saccharomyces boulardii (FLORASTOR) capsule 250 mg  250 mg Oral BID    sodium chloride 0 9 % infusion  75 mL/hr Intravenous Continuous    sodium chloride 0 9 % infusion  75 mL/hr Intravenous Continuous    verapamil (CALAN-SR) CR tablet 240 mg  240 mg Oral Daily    and PTA meds:   Prior to Admission Medications   Prescriptions Last Dose Informant Patient Reported? Taking?    AVALIDE 300-12 5 MG per tablet 8/4/2020 at Unknown time  No Yes   Sig: Take 1 tablet by mouth daily   BD PEN NEEDLE DEV U/F 32G X 4 MM MISC 8/4/2020 at Unknown time  No Yes   Sig: Inject under the skin 4 (four) times a day   Blood Glucose Monitoring Suppl (ONE TOUCH ULTRA 2) w/Device KIT  Self No No   Sig: by Does not apply route 2 (two) times a day   amoxicillin-clavulanate (AUGMENTIN) 875-125 mg per tablet 8/4/2020 at Unknown time  Yes Yes   Sig: Take 1 tablet by mouth every 12 (twelve) hours   glucose blood test strip 2020 at Unknown time  No Yes   Sig: Check blood sugar daily   insulin glargine (Lantus SoloStar) 100 units/mL injection pen 8/3/2020 at Unknown time  No Yes   Si u bedtime   insulin glulisine (Apidra SoloStar) 100 units/mL injection pen 8/3/2020 at Unknown time  No Yes   Sig: Inject 40 Units under the skin 3 (three) times a day with meals   omeprazole (PriLOSEC) 40 MG capsule 2020 at Unknown time  No Yes   Sig: TAKE 1 CAPSULE DAILY   verapamil (VERELAN PM) 360 MG 24 hr capsule 2020 at Unknown time Self Yes Yes   Sig: Take 360 mg by mouth daily       Facility-Administered Medications: None     No Known Allergies    Objective   Vitals: Blood pressure 152/94, pulse 76, temperature 98 9 °F (37 2 °C), resp  rate 20, height 6' 4" (1 93 m), weight 113 kg (249 lb 1 9 oz), SpO2 96 %      Intake/Output Summary (Last 24 hours) at 2020 1116  Last data filed at 2020 0601  Gross per 24 hour   Intake 3098 75 ml   Output 2325 ml   Net 773 75 ml     Invasive Devices     Peripheral Intravenous Line            Peripheral IV 20 Right Arm 1 day                Physical Exam  Patient is awake alert and oriented x 3  Vascular: no pulses, no pedal hair, thin shiny skin  Orthopedic: contractures to lesser digits, ROM WNL, MMT 5/5  Neurological: No POP no sensation  Dermatological: gangrene to the right great toe to the MPJ level, no pedal hair, thin shiny skin

## 2020-08-06 NOTE — BRIEF OP NOTE (RAD/CATH)
IR ABDOMINAL ANGIOGRAPHY / INTERVENTION Procedure Note    PATIENT NAME: Rupa Hurley  : 1965  MRN: [id number]    Pre-op Diagnosis:   1  Sepsis ([address] )    2  Gangrenous toe ([address] )    3  Diabetes ([address] )      Post-op Diagnosis:   1  Sepsis ([address] )    2  Gangrenous toe ([address] )    3  Diabetes [facility])        Surgeon:   Su Councilman, MD  Assistants:     No qualified resident was available, Resident is only observing    Estimated Blood Loss: 10 ml  Findings:   LLE agram showed  of the R P2 and P3 segment, with 3 vessel tibial reconstituted runoff  Recanalization of  with 6mm Supera stenting and angioplasty of the P2 and P3  Manual compression L groin for access  Load with 300 mg plavix this evening  He should continue his 81 mg ASA daily  Best practice pharmacologic therapy of critical limb-threatening ischemia (CLTI), as recommended by the Society for Vascular Surgery (SVS) and American College of Cardiology/American Heart Association (ACC/AHA), includes:    -A low dose antiplatelet agent (high level recommendation)  Either  mg aspirin daily or 75 mg clopidogrel daily (CAPRIE trial, Lancet, 1996) (moderate level recommendation)   -A moderate- or high-intensity statin (TONI trial, NEJM, 2008) (Rosuvastatin 20-40 mg daily or atorvastatin 40-80 mg daily) (high level recommendation)  -control hypertension to target levels of <963 mmHg systolic and <60 mmHg diastolic (ACCORD trial, NEJM 2010) (high level recommendation)  The use of ACEi has been shown to reduce risk of MI, stroke, and vascular death in the PAD population, although this has not specifically been studied in the [facility] population (HOPE trial, NEJM, 2000) (moderate level recommendation)  -Use metformin as the primary hypoglycemic agent in patients with T2DM and CLTI Arn [place], [address], 2016) (high level recommendation)      Rivaroxaban 2 5 mg twice daily plus 75 mg aspirin is associated with a significantly lower incidence of a composite outcome of acute limb ischemia, major amputation, MI, stroke, and cardiovascular death than aspirin alone (VOYAGER trial, [facility], 2020)  Specimens: None  Complications:  None      Anesthesia: Conscious sedation    Barbara Fischer MD     Date: 8/6/2020  Time: 5:55 PM

## 2020-08-06 NOTE — ASSESSMENT & PLAN NOTE
Gangrene of right great toe and hallux with cellulitis  Reports foul-smelling discharge from the toe for 1 week and progressively worsening discoloration to right great toe  Started on Augmentin on Thursday last week by podiatrist   X-ray right foot showed possible osteomyelitis  MRI foot Osteomyelitis of the distal portion of the first distal phalanx  Patient received IV Vanco, cefepime in ED  Evaluated by ID and antibiotic changed to IV Ancef and Flagyl  Patient seen by podiatry, appreciate input  Patient will need surgery later this week  Has popliteal artery occlusion, plan for angioplasty today by IR  Vascular surgery on board also  Patient was evaluated by Dr Alba Meadows

## 2020-08-06 NOTE — ASSESSMENT & PLAN NOTE
Creatinine 1 36  Baseline creatinine around 0 8  Improved with IV hydration    Creatinine 0 92 today  Started on IV hydration again because of angioplasty    Results from last 7 days   Lab Units 08/06/20  1151 08/05/20  0506 08/04/20  1358 07/31/20  1026   BUN mg/dL 12 23 33* 38*   CREATININE mg/dL 0 92 0 92 1 36* 1 37*

## 2020-08-06 NOTE — ASSESSMENT & PLAN NOTE
-continue to optimize medical management  -restart statin therapy  -add atorvastatin 40mg today  -management per [facility]

## 2020-08-06 NOTE — PROGRESS NOTES
Progress Note - Patricia Shearer 1965, 60 y o  male MRN: [id number]    Unit/Bed#: -01 Encounter: [id number]    Primary Care Provider: Paolo Aranda MD   Date and time admitted to hospital: 8/4/2020  1:33 PM        PAD (peripheral artery disease) [facility])  Assessment & Plan  Right popliteal occlusion  Plan for IR angioplasty today  Follow vascular surgery recommendation  Hyponatremia  Assessment & Plan  Pseudohyponatremia due to hyperglycemia  Resolved    Acute kidney injury ([address] )  Assessment & Plan  Creatinine 1 36  Baseline creatinine around 0 8  Improved with IV hydration  Creatinine 0 92 today  Started on IV hydration again because of angioplasty    Results from last 7 days   Lab Units 08/06/20  1151 08/05/20  0506 08/04/20  1358 07/31/20  1026   BUN mg/dL 12 23 33* 38*   CREATININE mg/dL 0 92 0 92 1 36* 1 37*       Severe sepsis (HCC)  Assessment & Plan  POA, as evidenced by leukocytosis, tachycardia, lactic acidosis, with source of infection right great toe gangrene with cellulitis  Lactic acid 3 1 in ED  Normalized after receiving 2000 mL NS in ED  IV antibiotic as above  Blood cultures x2 no growth so far    Multiple thyroid nodules  Assessment & Plan  Status post thyroid biopsy  Outpatient follow-up with PCP    Type 2 diabetes mellitus with hyperglycemia, with long-term current use of insulin [facility])  Assessment & Plan  Lab Results   Component Value Date    HGBA1C 9 7 (H) 08/05/2020       Recent Labs     08/05/20  1103 08/05/20  1558 08/06/20  0758 08/06/20  1125   POCGLU 159* 254* 157* 158*       Blood Sugar Average: Last 72 hrs:  (P) 214 [id number]   Poorly controlled type 2 diabetes  On Lantus 80 units HS and Apidra 40 units t i d  With meals at home  Will continue Lantus  Decrease mealtime insulin to 8 units t i d   SSI  Diabetic diet  Blood sugar acceptable    Obesity, Class I, BMI 30 0-34 9 (see actual BMI)  Assessment & Plan  Body mass index is 30 32 kg/m²    Diet and lifestyle modification    Dyslipidemia, goal LDL below 100  Assessment & Plan  Not on medication at home  LDL high, started on statin    Gastroesophageal reflux disease without esophagitis  Assessment & Plan  Continue PPI    Hypertension, essential  Assessment & Plan  BP acceptable  Hold Avalide  Continue verapamil  Monitor BP    * Gangrene of toe of right foot (HCC)  Assessment & Plan  Gangrene of right great toe and hallux with cellulitis  Reports foul-smelling discharge from the toe for 1 week and progressively worsening discoloration to right great toe  Started on Augmentin on Thursday last week by podiatrist   X-ray right foot showed possible osteomyelitis  MRI foot Osteomyelitis of the distal portion of the first distal phalanx  Patient received IV Vanco, cefepime in ED  Evaluated by ID and antibiotic changed to IV Ancef and Flagyl  Patient seen by podiatry, appreciate input  Patient will need surgery later this week  Has popliteal artery occlusion, plan for angioplasty today by IR  Vascular surgery on board also  Patient was evaluated by Dr Masoud Henson  VTE Pharmacologic Prophylaxis:   Pharmacologic: Heparin  Mechanical VTE Prophylaxis in Place: Yes    Patient Centered Rounds: I have performed bedside rounds with nursing staff today  Discussions with Specialists or Other Care Team Provider:  Vascular surgery, Podiatry    Education and Discussions with Family / Patient:     Time Spent for Care: More than 50% of total time spent on counseling and coordination of care as described above  Current Length of Stay: 2 day(s)    Current Patient Status: Inpatient   Certification Statement: The patient will continue to require additional inpatient hospital stay due to See above    Discharge Plan:  72 hours    Code Status: No Order      Subjective:   I have seen and examined the patient bedside this morning  Patient denies any new complaints  Afebrile    He has a right popliteal artery occlusion, need intervention  Osteomyelitis of right great toe need amputation  Podiatry on board  Blood sugar acceptable  Denies any chest pain, palpitation or shortness of breath  Objective:     Vitals:   Temp (24hrs), Av 9 °F (37 2 °C), Min:98 9 °F (37 2 °C), Max:98 9 °F (37 2 °C)    Temp:  [98 9 °F (37 2 °C)] 98 9 °F (37 2 °C)  Resp:  [19-20] 20  BP: (120-152)/(76-94) 152/94  Body mass index is 30 32 kg/m²  Input and Output Summary (last 24 hours): Intake/Output Summary (Last 24 hours) at 2020 1523  Last data filed at 2020 0601  Gross per 24 hour   Intake 1538 75 ml   Output 2000 ml   Net -461 25 ml       Physical Exam:     Physical Exam  General- Awake, alert and oriented x 3, looks comfortable  HEENT- Normocephalic, atraumatic  Neck- Supple  CVS- Normal S1/ S2, Regular rate and rhythm  Respiratory system- B/L clear breath sounds, no wheezing  Abdomen- Soft, Non distended, no tenderness, Bowel sound- present  Musculoskeletal- right great toe gangrene  CNS-  No acute focal neurologic deficit noted    Additional Data:     Labs:    Results from last 7 days   Lab Units 20  1155   WBC Thousand/uL 14 02*   HEMOGLOBIN g/dL 11 7*   HEMATOCRIT % 34 0*   PLATELETS Thousands/uL 311   NEUTROS PCT % 73   LYMPHS PCT % 14   MONOS PCT % 10   EOS PCT % 1     Results from last 7 days   Lab Units 20  1151  20  1358   SODIUM mmol/L 136   < > 129*   POTASSIUM mmol/L 3 4*   < > 4 7   CHLORIDE mmol/L 102   < > 93*   CO2 mmol/L 27   < > 23   BUN mg/dL 12   < > 33*   CREATININE mg/dL 0 92   < > 1 36*   ANION GAP mmol/L 7   < > 13   CALCIUM mg/dL 8 4   < > 9 3   ALBUMIN g/dL  --   --  3 1*   TOTAL BILIRUBIN mg/dL  --   --  1 20*   ALK PHOS U/L  --   --  95   ALT U/L  --   --  39   AST U/L  --   --  31   GLUCOSE RANDOM mg/dL 176*   < > 365*    < > = values in this interval not displayed       Results from last 7 days   Lab Units 20  1358   INR  1 04     Results from last 7 days   Lab Units 08/06/20  1125 08/06/20  0758 08/05/20  1558 08/05/20  1103 08/05/20  0737 08/04/20  2139 08/04/20  1634   POC GLUCOSE mg/dl 158* 157* 254* 159* 176* 299* 296*     Results from last 7 days   Lab Units 08/05/20  0506 07/31/20  1026   HEMOGLOBIN A1C % 9 7* 10 0*     Results from last 7 days   Lab Units 08/05/20  0506 08/04/20  1709 08/04/20  1358   LACTIC ACID mmol/L  --  1 9 3 1*   PROCALCITONIN ng/ml 0 13  --   --            * I Have Reviewed All Lab Data Listed Above  * Additional Pertinent Lab Tests Reviewed: All Labs Within Last 24 Hours Reviewed    Imaging:    Imaging Reports Reviewed Today Include:   Imaging Personally Reviewed by Myself Includes:      Recent Cultures (last 7 days):     Results from last 7 days   Lab Units 08/04/20  1837 08/04/20  1410 08/04/20  1358   BLOOD CULTURE   --  No Growth at 24 hrs  No Growth at 24 hrs     GRAM STAIN RESULT  2+ Gram positive cocci in pairs and chains*  1+ Gram negative rods*  No polys seen*  --   --    WOUND CULTURE  3+ Growth of Non lactose fermenting gram negative mann*  --   --        Last 24 Hours Medication List:   acetaminophen, 650 mg, Oral, Q6H PRN, NAILA Suarez  aspirin, 81 mg, Oral, Daily, Octavia Davis PA-C  atorvastatin, 40 mg, Oral, Daily With Dinner, NAILA López  cefazolin, 2,000 mg, Intravenous, Q8H, Nurys Cole MD, Last Rate: 2,000 mg (08/06/20 1138)  heparin (porcine), 5,000 Units, Subcutaneous, Q8H [facility], Mona Del Cid MD  hydrOXYzine HCL, 25 mg, Oral, Q6H PRN, NAILA Stahl  insulin glargine, 80 Units, Subcutaneous, HSDariela CRNP  insulin lispro, 1-5 Units, Subcutaneous, TID ACDariela CRNP  insulin lispro, 1-5 Units, Subcutaneous, HS, NAILA Suarez  insulin lispro, 8 Units, Subcutaneous, TID With Meals, NAILA Suarez  metroNIDAZOLE, 500 mg, Intravenous, Q8H, NAILA Suarez, Last Rate: 500 mg (08/06/20 0937)  ondansetron, 4 mg, Intravenous, Q6H PRN, NAILA Suarez  pantoprazole, 40 mg, Oral, Early Morning, Renikaycee Issarijacob, RICARDONP  saccharomyces boulardii, 250 mg, Oral, BID, Cuiyin Yurik, CRNP  sodium chloride, 75 mL/hr, Intravenous, Continuous, Cuiyin Maeganrik, CRNP, Last Rate: 75 mL/hr (08/06/20 0527)  sodium chloride, 75 mL/hr, Intravenous, Continuous, Fanta Lopez CRNP, Last Rate: 75 mL/hr (08/06/20 1138)  verapamil, 240 mg, Oral, Daily, Renikaycee Issarijacob, CRNP         Today, Patient Was Seen By: Jr Ledezma MD    ** Please Note: Dictation voice to text software may have been used in the creation of this document   **

## 2020-08-06 NOTE — ASSESSMENT & PLAN NOTE
-local wound care per Podiatry  -will ultimately require podiatric intervention after vascular workup complete  -For angiogram today with RLE runoff and intervention   -continue antibiotics  -blood cultures:  No growth 24 hrs  -wound culture:  2+ DPC, 1+ GNR

## 2020-08-06 NOTE — PROGRESS NOTES
Progress Note - Infectious Disease   Zakiya Short 60 y o  male MRN: [id number]  Unit/Bed#: -01 Encounter: [id number]      Impression/Plan:  1  Sepsis, POA  Patient presented on admission with leukocytosis and tachycardia  Source of infection is problem 2  No other obvious sources on exam   Patient is fortunately hemodynamically stable  Continue antibiotics as below  Continue to trend fever curve/vitals  Repeat CBC/chemistry tomorrow  Follow up pending culture data  Additional supportive care as per primary      2  Gangrene of right foot great toe with osteomyelitis  Patient has had clinical progression of a previous injury approximately 8 weeks ago where he reports that the bone was exposed at that time  MRI confirms osteomyelitis  Exam consistent with severe necrosis/tissue death and suspected super infection with foul-smelling drainage  Patient with minimal hospital/antibiotic exposure  Patient is likely to go on to amputation  Continue Ancef with Flagyl  Continue to trend fever curve/vitals  Repeat CBC/chemistry tomorrow  Follow up pending culture data  Ongoing evaluations by vascular surgery  Podiatry evaluation appreciated  Additional supportive care as per primary  Duration of therapy pending clinical course     3  Acute kidney injury  Acute elevation in creatinine noted from baseline  Suspect that this was due to acute illness  Has since down trended  Impacts antibiotic dosing  Will continue Ancef high-dose for now  Continue to monitor chemistry closely  Will further dose adjust antibiotic as needed     4  Poorly controlled diabetes with neuropathy and vascular disease  Patient noted with hemoglobin A1c of 10  He also has underlying neuropathy as well as peripheral vascular disease  Unfortunately these contribute to poor wound healing and infection as above  Ongoing glucose management as per primary  Additional workup ongoing by vascular surgery  Antibiotics as above      5  Obesity  Patient noted with BMI of 30  Can impact antibiotic efficacy/dosing  [facility] higher dosing of Ancef at this time  Recommend discussion of weight loss as outpatient      6  Thyroid cancer  Patient recently diagnosed with thyroid cancer in July  Currently not on any chemotherapy  Recommend follow-up with oncology/endocrinology as outpatient      Above plan discussed in detail with the patient at bedside      ID consult service will continue to follow  Antibiotics:  Ancef/Flagyl 2  Total antibiotic 3    24 hour events:  No acute events noted overnight on chart review  Currently afebrile  Wound cultures pending  Blood cultures without growth  No new imaging  Patient's other vitals are stable  Labs pending collection this morning    Subjective:  Patient currently denies having any nausea, vomiting, chest pain or shortness of breath  Tolerating antibiotic without acute issue  He is unaware of plans for additional procedures  He denies any progressing or new pain in his foot  Dressing just changed this morning  Objective:  Vitals:  Temp:  [98 9 °F (37 2 °C)] 98 9 °F (37 2 °C)  Resp:  [19-20] 20  BP: (120-152)/(76-94) 152/94  Temp (24hrs), Av 9 °F (37 2 °C), Min:98 9 °F (37 2 °C), Max:98 9 °F (37 2 °C)  Current: Temperature: 98 9 °F (37 2 °C)    Physical Exam:   General Appearance:  Alert, interactive, nontoxic, no acute distress  Throat: Oropharynx moist without lesions  Lungs:   Clear to auscultation bilaterally; no wheezes, rhonchi or rales; respirations unlabored on room air   Heart:  RRR; no murmur, rub or gallop appreciated   Abdomen:   Soft, non-tender, non-distended, positive bowel sounds  Extremities: No clubbing, cyanosis or edema   Skin: No new rashes or lesions  No new draining wounds noted  Spreading erythema up the patients like her outside the bandages  Patient has a foul smell coming from his foot  Drainage noted on the bandages    Did not fully unwrap the patient's foot as it was just we wrapped this morning       Labs, Imaging, & Other studies:   All pertinent labs and imaging studies were personally reviewed  Results from last 7 days   Lab Units 08/05/20  0506 08/04/20  1358   WBC Thousand/uL 14 74* 17 43*   HEMOGLOBIN g/dL 11 6* 14 1   PLATELETS Thousands/uL 297 331     Results from last 7 days   Lab Units 08/05/20  0506 08/04/20  1358 07/31/20  1026   POTASSIUM mmol/L 3 7 4 7 4 5   CHLORIDE mmol/L 100 93* 96*   CO2 mmol/L 26 23 26   BUN mg/dL 23 33* 38*   CREATININE mg/dL 0 92 1 36* 1 37*   EGFR ml/min/1 73sq m 92 57 57   CALCIUM mg/dL 8 2* 9 3 9 5   AST U/L  --  31 39   ALT U/L  --  39 59   ALK PHOS U/L  --  95 86     Results from last 7 days   Lab Units 08/04/20  1837 08/04/20  1410 08/04/20  1358   BLOOD CULTURE   --  No Growth at 24 hrs  No Growth at 24 hrs     GRAM STAIN RESULT  2+ Gram positive cocci in pairs and chains*  1+ Gram negative rods*  No polys seen*  --   --    WOUND CULTURE  Culture too young- will reincubate  --   --

## 2020-08-06 NOTE — PROGRESS NOTES
Progress Note - Zakiya Short 1965, 60 y o  male MRN: [id number]    Unit/Bed#: -01 Encounter: [id number]    Primary Care Provider: Negar Sanders MD   Date and time admitted to hospital: 8/4/2020  1:33 PM        PAD (peripheral artery disease) [facility])  Assessment & Plan  78-year-old male nonsmoker with uncontrolled DM (HgbA1C 10), HTN, HLD, thyroid CA and GERD admitted with worsening are hallux gangrene with cellulitis and associated sepsis  Vascular surgery consulted for optimization of wound healing    Diagnostics:  -ALISA:  R popliteal occlusion  R SHANNAN 07/83/63; L SHANNAN 1 3/181/132  -MRI right foot 8/4/2020:  Osteomyelitis of the 1st distal phalanx  -x-ray right foot 8/4/2020:  No subcutaneous gas or lytic lesions    Plan:  -R distal popliteal occlusion with collaterals noted  Dr Rohit Durham will place NPO order and we will schedule angiogram today  Discussed with patient, he is in agreement and will proceed with angiogram to the RLE  Dr Ebony Lombardi entered room while discussing procedure and she is aware and in agreement  D/w Dr Emmanuel Constantino as well  Cr 0 92/GFR 92  INR 1 04 this admission  -will give IVF for angiogram today  -continue local wound care per Podiatry  Will require podiatric intervention after revascularization  -continue antibiotics per ID  On Flagyl, Maxipime, Vanco   Wound culture +DP C, GNR  BC no growth at 24 hrs     Continue to follow up cultures  -discussed with Dr Emmanuel Constantino  Thank you for allowing us to participate in the care of this patient    ---SLIM aware of angiogram today    Acute kidney injury ([address] )  Assessment & Plan  -resolved  -creatinine 1 36/GFR 57 on admission  -creatinine down to 0 92/GFR 92 this yesterday  -continue hydration  -continue supportive care per primary medical service    Severe sepsis [facility])  Assessment & Plan  R hallux gangrene with OM and sepsis on admission  -nontoxic in hemodynamics stable  -continue antibiotics per ID  -continue supportive care per primary medical service    Type 2 diabetes mellitus with hyperglycemia, with long-term current use of insulin [facility])  Assessment & Plan  Lab Results   Component Value Date    HGBA1C 9 7 (H) 08/05/2020       Recent Labs     08/05/20  0737 08/05/20  1103 08/05/20  1558 08/06/20  0758   POCGLU 176* 159* 254* 157*       Blood Sugar Average: Last 72 hrs:  (P) 223 5   -uncontrolled  -continue to optimize BS control for optimization of wound healing and prevention of SSI  -management per SLIM    Dyslipidemia, goal LDL below 100  Assessment & Plan  -continue to optimize medical management  -restart statin therapy  -add atorvastatin 40mg today  -management per SLIM    Hypertension, essential  Assessment & Plan  -BP well controlled  -continue current medical regimen  -management per primary medical service    * Gangrene of toe of right foot ([address] )  Assessment & Plan  -local wound care per Podiatry  -will ultimately require podiatric intervention after vascular workup complete  -For angiogram today with RLE runoff and intervention   -continue antibiotics  -blood cultures:  No growth 24 hrs  -wound culture:  2+ DPC, 1+ GNR          Subjective:  Patient denies any CP, SOB, F/C, pain to the right foot or leg  We discussed an angiogram with intervention to the right leg as he has a popliteal occlusion  He expressed understanding and agrees to NPO now, procedure this afternoon  We also discussed the importance of managing his diabetes  He again, expressed understanding regarding better management  Will proceed with IR angiogram today  Vitals:  /94   Pulse 76   Temp 98 9 °F (37 2 °C)   Resp 20   Ht 6' 4" (1 93 m)   Wt 113 kg (249 lb 1 9 oz)   SpO2 96%   BMI 30 32 kg/m²     I/Os:  I/O last 3 completed shifts: In: 3218 8 [P O :600; I V :2618 8]  Out: 3275 [Urine:3275]  No intake/output data recorded      Lab Results and Cultures:   Lab Results   Component Value Date    WBC 14 74 (H) 08/05/2020    HGB 11 6 (L) 08/05/2020    HCT 34 5 (L) 08/05/2020    MCV 91 08/05/2020     08/05/2020     Lab Results   Component Value Date    CALCIUM 8 2 (L) 08/05/2020    K 3 7 08/05/2020    CO2 26 08/05/2020     08/05/2020    BUN 23 08/05/2020    CREATININE 0 92 08/05/2020     Lab Results   Component Value Date    INR 1 04 08/04/2020    INR 0 99 11/15/2018    PROTIME 13 8 08/04/2020    PROTIME 13 0 11/15/2018        Blood Culture:   Lab Results   Component Value Date    BLOODCX No Growth at 24 hrs  08/04/2020   ,   Urinalysis:   Lab Results   Component Value Date    COLORU Yellow 09/27/2019    CLARITYU Clear 09/27/2019    SPECGRAV 1 020 09/27/2019    PHUR 5 5 09/27/2019    PHUR 6 0 11/15/2018    LEUKOCYTESUR (A) 09/27/2019     Elevated glucose may cause decreased leukocyte values   See urine microscopic for [facility] result/    NITRITE Negative 09/27/2019    GLUCOSEU >=1000 (1%) (A) 09/27/2019    KETONESU Negative 09/27/2019    BILIRUBINUR Negative 09/27/2019    BLOODU Moderate (A) 09/27/2019   ,   Urine Culture: No results found for: URINECX,   Wound Culure:   Lab Results   Component Value Date    WOUNDCULT Culture too young- will reincubate 08/04/2020       Medications:  Current Facility-Administered Medications   Medication Dose Route Frequency    acetaminophen (TYLENOL) tablet 650 mg  650 mg Oral Q6H PRN    aspirin chewable tablet 81 mg  81 mg Oral Daily    atorvastatin (LIPITOR) tablet 40 mg  40 mg Oral Daily With Dinner    ceFAZolin (ANCEF) IVPB (premix) 2,000 mg 50 mL  2,000 mg Intravenous Q8H    heparin (porcine) subcutaneous injection 5,000 Units  5,000 Units Subcutaneous Q8H [address]    hydrOXYzine HCL (ATARAX) tablet 25 mg  25 mg Oral Q6H PRN    insulin glargine (LANTUS) subcutaneous injection 80 Units 0 8 mL  80 Units Subcutaneous HS    insulin lispro (HumaLOG) 100 units/mL subcutaneous injection 1-5 Units  1-5 Units Subcutaneous TID AC    insulin lispro (HumaLOG) 100 units/mL subcutaneous injection 1-5 Units  1-5 Units Subcutaneous HS    insulin lispro (HumaLOG) 100 units/mL subcutaneous injection 8 Units  8 Units Subcutaneous TID With Meals    metroNIDAZOLE (FLAGYL) IVPB (premix) 500 mg 100 mL  500 mg Intravenous Q8H    ondansetron (ZOFRAN) injection 4 mg  4 mg Intravenous Q6H PRN    pantoprazole (PROTONIX) EC tablet 40 mg  40 mg Oral Early Morning    saccharomyces boulardii (FLORASTOR) capsule 250 mg  250 mg Oral BID    sodium chloride 0 9 % infusion  75 mL/hr Intravenous Continuous    sodium chloride 0 9 % infusion  75 mL/hr Intravenous Continuous    verapamil (CALAN-SR) CR tablet 240 mg  240 mg Oral Daily       Imaging:  LEADS: RLE: Popliteal occlusion  R SHANNAN 0 7/83/63  L SHANNAN 1 3/181/132    Physical Exam:    General appearance: alert and oriented, in no acute distress, cooperative, no distress and moderately obese  Skin: Skin color, texture, turgor normal  No rashes or lesions  Neurologic: Grossly normal  Neck: no adenopathy, no carotid bruit, no JVD, supple, symmetrical, trachea midline and thyroid not enlarged, symmetric, no tenderness/mass/nodules  Lungs: clear to auscultation bilaterally  Heart: regular rate and rhythm, S1, S2 normal, no murmur, click, rub or gallop  Abdomen: soft, non-tender; bowel sounds normal; no masses,  no organomegaly  Extremities: B/L LE are warm and dry  There is a dressing to the right foot, no palpable pulse to right foot  M/S intact to both feet/legs  see clinical image below        Wound/Incision:  RIGHT HALLUX:          Pulse exam:  Radial: Right: 2+ Left[de-identified] 2+  Femoral: Right: 2+ Left: 2+  Popliteal: Right: non-palpable Left: 1+  DP: Right: non-palpable Left: 2+  PT: Right: non-palpable Left: non-palpable      NAILA Navarro  8/6/2020  [facility]  [phone]

## 2020-08-06 NOTE — ASSESSMENT & PLAN NOTE
Lab Results   Component Value Date    HGBA1C 9 7 (H) 08/05/2020       Recent Labs     08/05/20  1103 08/05/20  1558 08/06/20  0758 08/06/20  1125   POCGLU 159* 254* 157* 158*       Blood Sugar Average: Last 72 hrs:  (P) 214 [id number]   Poorly controlled type 2 diabetes  On Lantus 80 units HS and Apidra 40 units t i d  With meals at home  Will continue Lantus    Decrease mealtime insulin to 8 units t i d   SSI  Diabetic diet  Blood sugar acceptable

## 2020-08-06 NOTE — ASSESSMENT & PLAN NOTE
-resolved  -creatinine 1 36/GFR 57 on admission  -creatinine down to 0 92/GFR 92 this yesterday  -continue hydration  -continue supportive care per primary medical service

## 2020-08-06 NOTE — PROGRESS NOTES
Progress Note - PODIATRY  Anselmo Roland 60 y o  male MRN: [id number]  Unit/Bed#: -01 Encounter: [id number]      Assessment:     Dry gangrene with cellulitis R hallux  Diabetes with peripheral neuropathy and peripheral vascular disease    Plan:   Was called yesterday by Dr Marietta Manjarrez who reported patient was displeased with his podiatrist and wished to transfer care to another podiatry group  The patient did speak to his podiatrist, Dr Ryann Mcneal and expressed his concerns to her and reaffirmed his desire to have another podiatric surgeon take care of him  I am seeing patient today to make sure of continuity of care until he is seen by Dr Medley Si  Labs were reviewed  MRI shows osteomyelitis in the distal phalanx no abscess or more proximal or lateral acute pathology  Vascular service has seen the patient, noninvasive testing has been done and the patient is scheduled for angiogram later today  ID service is also following the patient closely  Diagnostic testing and treatment and rationale for same was reviewed with the patient  The patient understands that he will need amputation of the toe soon after completion of vascular procedure  The patient was advised that I am prepared to do the necessary surgery and assume his care afterwards  The patient verbalised understanding that I  work in the same practice as Dr Ryann Mcneal and he continued to express the preference to have a different practice take care of him  Subjective:  Patient reports feeling generally well  No new foot problems or concerns  Objective:   Patient is alert and oriented  Pleasant and cooperative with examination and discussion  No new neurovascular or musculoskeletel changes or findings  There is dry gangrene of the entire R hallux to the MPJ  Erythema/hyperemia at the base of the toe  Local edema in the foot  Proximal margin of the gangrenous toe with some scant seropurulent drainage  There is a moderate malodor c/w dry gangrene   There is no palpable fluctuance or soft tissue crepitus  Vitals: Blood pressure 152/94, pulse 76, temperature 98 9 °F (37 2 °C), resp  rate 20, height 6' 4" (1 93 m), weight 113 kg (249 lb 1 9 oz), SpO2 96 %  ,Body mass index is 30 32 kg/m²  Physical Exam: see above    Lab, Imaging and other studies: Labs and imaging reviewed  Wound 08/05/20 Neuropathic/diabetic ulcer Toe (Comment  which one) Anterior;Right (Active)   Wound Description [facility]MA 08/06/20 0920   Ana-wound Assessment [facility] 08/06/20 0920   Drainage Amount None 08/05/20 1032   Dressing Gauze;Dry dressing 08/05/20 2114   Dressing Changed Changed by provider 08/06/20 0920   Dressing Status Clean;Dry; Intact 08/06/20 0920

## 2020-08-06 NOTE — UTILIZATION REVIEW
Notification of Inpatient Admission/Inpatient Authorization Request   This is a Notification of Inpatient Admission for [address]  Be advised that this patient was admitted to our facility under Inpatient Status  Contact Jesu Espinal at [phone] for additional admission information  [facility] DEPT DEDICATED Shefali Mcdaniel [phone]  Patient Name:   Jose Angel Enciso   YOB: 1965       [address]   [address]:   [address]   Tax ID: [id number]  NPI: [id number] Attending Provider/NPI:  Phone:  Address: Wayne Soto [[id number]]  [phone]  Same as SABRA/Ubal[address]6 of Service Code: 24     Place of Service Name:  [address] [place] [address]   Start Date: 8/4/20 1453 Discharge Date & Time: No discharge date for patient encounter  Type of Admission: Inpatient Status Discharge Disposition   (if discharged): Home/Self Care   Patient Diagnoses: Diabetes ([address] ) [E11 9]  Toe pain [M79 676]  Gangrenous toe ([address] ) Loan Tae  Sepsis ([facility] [address] ) [A41 9]     Orders: Admission Orders (From admission, onward)     Ordered        08/04/20 1453  Inpatient Admission (expected length of stay for this patient Order details is greater than two midnights)  Once                    Assigned Utilization Review Contact: Jesu Espinal  Utilization   Network Utilization Review Department  Phone: [phone]; Fax [phone]  Email: [email or web address]   ATTENTION PAYERS: Please call the assigned Utilization  directly with any questions or concerns ALL voicemails in the department are confidential  Send all requests for admission clinical reviews, approved or denied determinations and any other requests to dedicated fax number belonging to the campus where the patient is receiving treatment

## 2020-08-06 NOTE — ASSESSMENT & PLAN NOTE
POA, as evidenced by leukocytosis, tachycardia, lactic acidosis, with source of infection right great toe gangrene with cellulitis  Lactic acid 3 1 in ED  Normalized after receiving 2000 mL NS in ED    IV antibiotic as above  Blood cultures x2 no growth so far

## 2020-08-07 ENCOUNTER — APPOINTMENT (INPATIENT)
Dept: VASCULAR ULTRASOUND | Facility: HOSPITAL | Age: 55
DRG: 854 | End: 2020-08-07
Payer: COMMERCIAL

## 2020-08-07 LAB
ANION GAP SERPL CALCULATED.3IONS-SCNC: 8 MMOL/L (ref 4–13)
BASOPHILS # BLD AUTO: 0.08 THOUSANDS/ΜL (ref 0–0.1)
BASOPHILS NFR BLD AUTO: 1 % (ref 0–1)
BUN SERPL-MCNC: 14 MG/DL (ref 5–25)
CALCIUM SERPL-MCNC: 8.3 MG/DL (ref 8.3–10.1)
CHLORIDE SERPL-SCNC: 101 MMOL/L (ref 100–108)
CO2 SERPL-SCNC: 27 MMOL/L (ref 21–32)
CREAT SERPL-MCNC: 0.75 MG/DL (ref 0.6–1.3)
EOSINOPHIL # BLD AUTO: 0.2 THOUSAND/ΜL (ref 0–0.61)
EOSINOPHIL NFR BLD AUTO: 1 % (ref 0–6)
ERYTHROCYTE [DISTWIDTH] IN BLOOD BY AUTOMATED COUNT: 11.5 % (ref 11.6–15.1)
GFR SERPL CREATININE-BSD FRML MDRD: 102 ML/MIN/1.73SQ M
GLUCOSE SERPL-MCNC: 112 MG/DL (ref 65–140)
GLUCOSE SERPL-MCNC: 127 MG/DL (ref 65–140)
GLUCOSE SERPL-MCNC: 143 MG/DL (ref 65–140)
GLUCOSE SERPL-MCNC: 157 MG/DL (ref 65–140)
GLUCOSE SERPL-MCNC: 172 MG/DL (ref 65–140)
HCT VFR BLD AUTO: 33.1 % (ref 36.5–49.3)
HGB BLD-MCNC: 11.2 G/DL (ref 12–17)
IMM GRANULOCYTES # BLD AUTO: 0.17 THOUSAND/UL (ref 0–0.2)
IMM GRANULOCYTES NFR BLD AUTO: 1 % (ref 0–2)
LYMPHOCYTES # BLD AUTO: 2.02 THOUSANDS/ΜL (ref 0.6–4.47)
LYMPHOCYTES NFR BLD AUTO: 15 % (ref 14–44)
MCH RBC QN AUTO: 30.7 PG (ref 26.8–34.3)
MCHC RBC AUTO-ENTMCNC: 33.8 G/DL (ref 31.4–37.4)
MCV RBC AUTO: 91 FL (ref 82–98)
MONOCYTES # BLD AUTO: 1.39 THOUSAND/ΜL (ref 0.17–1.22)
MONOCYTES NFR BLD AUTO: 10 % (ref 4–12)
NEUTROPHILS # BLD AUTO: 9.96 THOUSANDS/ΜL (ref 1.85–7.62)
NEUTS SEG NFR BLD AUTO: 72 % (ref 43–75)
NRBC BLD AUTO-RTO: 0 /100 WBCS
PLATELET # BLD AUTO: 340 THOUSANDS/UL (ref 149–390)
PMV BLD AUTO: 10.8 FL (ref 8.9–12.7)
POTASSIUM SERPL-SCNC: 3.3 MMOL/L (ref 3.5–5.3)
RBC # BLD AUTO: 3.65 MILLION/UL (ref 3.88–5.62)
SARS-COV-2 RNA RESP QL NAA+PROBE: NEGATIVE
SODIUM SERPL-SCNC: 136 MMOL/L (ref 136–145)
WBC # BLD AUTO: 13.82 THOUSAND/UL (ref 4.31–10.16)

## 2020-08-07 PROCEDURE — 93923 UPR/LXTR ART STDY 3+ LVLS: CPT

## 2020-08-07 PROCEDURE — 99232 SBSQ HOSP IP/OBS MODERATE 35: CPT | Performed by: INTERNAL MEDICINE

## 2020-08-07 PROCEDURE — 80048 BASIC METABOLIC PNL TOTAL CA: CPT | Performed by: INTERNAL MEDICINE

## 2020-08-07 PROCEDURE — 93923 UPR/LXTR ART STDY 3+ LVLS: CPT | Performed by: SURGERY

## 2020-08-07 PROCEDURE — 87635 SARS-COV-2 COVID-19 AMP PRB: CPT | Performed by: PODIATRIST

## 2020-08-07 PROCEDURE — 82948 REAGENT STRIP/BLOOD GLUCOSE: CPT

## 2020-08-07 PROCEDURE — 85025 COMPLETE CBC W/AUTO DIFF WBC: CPT | Performed by: INTERNAL MEDICINE

## 2020-08-07 PROCEDURE — 99233 SBSQ HOSP IP/OBS HIGH 50: CPT | Performed by: NURSE PRACTITIONER

## 2020-08-07 PROCEDURE — 99233 SBSQ HOSP IP/OBS HIGH 50: CPT | Performed by: INTERNAL MEDICINE

## 2020-08-07 RX ORDER — CLOPIDOGREL BISULFATE 75 MG/1
75 TABLET ORAL DAILY
Status: DISCONTINUED | OUTPATIENT
Start: 2020-08-07 | End: 2020-08-12 | Stop reason: HOSPADM

## 2020-08-07 RX ORDER — POTASSIUM CHLORIDE 20 MEQ/1
40 TABLET, EXTENDED RELEASE ORAL ONCE
Status: COMPLETED | OUTPATIENT
Start: 2020-08-07 | End: 2020-08-07

## 2020-08-07 RX ADMIN — METRONIDAZOLE 500 MG: 500 INJECTION, SOLUTION INTRAVENOUS at 03:00

## 2020-08-07 RX ADMIN — HEPARIN SODIUM 5000 UNITS: 5000 INJECTION INTRAVENOUS; SUBCUTANEOUS at 21:24

## 2020-08-07 RX ADMIN — ASPIRIN 81 MG 81 MG: 81 TABLET ORAL at 08:55

## 2020-08-07 RX ADMIN — METRONIDAZOLE 500 MG: 500 INJECTION, SOLUTION INTRAVENOUS at 16:59

## 2020-08-07 RX ADMIN — HEPARIN SODIUM 5000 UNITS: 5000 INJECTION INTRAVENOUS; SUBCUTANEOUS at 13:05

## 2020-08-07 RX ADMIN — ATORVASTATIN CALCIUM 40 MG: 40 TABLET, FILM COATED ORAL at 16:59

## 2020-08-07 RX ADMIN — VERAPAMIL HYDROCHLORIDE 240 MG: 120 TABLET, FILM COATED, EXTENDED RELEASE ORAL at 08:55

## 2020-08-07 RX ADMIN — INSULIN LISPRO 1 UNITS: 100 INJECTION, SOLUTION INTRAVENOUS; SUBCUTANEOUS at 17:00

## 2020-08-07 RX ADMIN — SODIUM CHLORIDE 75 ML/HR: 0.9 INJECTION, SOLUTION INTRAVENOUS at 16:17

## 2020-08-07 RX ADMIN — CLOPIDOGREL BISULFATE 75 MG: 75 TABLET ORAL at 11:34

## 2020-08-07 RX ADMIN — POTASSIUM CHLORIDE 40 MEQ: 1500 TABLET, EXTENDED RELEASE ORAL at 09:54

## 2020-08-07 RX ADMIN — INSULIN GLARGINE 80 UNITS: 100 INJECTION, SOLUTION SUBCUTANEOUS at 21:24

## 2020-08-07 RX ADMIN — INSULIN LISPRO 8 UNITS: 100 INJECTION, SOLUTION INTRAVENOUS; SUBCUTANEOUS at 17:00

## 2020-08-07 RX ADMIN — Medication 250 MG: at 17:01

## 2020-08-07 RX ADMIN — INSULIN LISPRO 1 UNITS: 100 INJECTION, SOLUTION INTRAVENOUS; SUBCUTANEOUS at 21:27

## 2020-08-07 RX ADMIN — METRONIDAZOLE 500 MG: 500 INJECTION, SOLUTION INTRAVENOUS at 08:56

## 2020-08-07 RX ADMIN — CEFEPIME HYDROCHLORIDE 2000 MG: 2 INJECTION, POWDER, FOR SOLUTION INTRAVENOUS at 18:23

## 2020-08-07 RX ADMIN — INSULIN LISPRO 8 UNITS: 100 INJECTION, SOLUTION INTRAVENOUS; SUBCUTANEOUS at 13:04

## 2020-08-07 RX ADMIN — CEFAZOLIN SODIUM 2000 MG: 2 SOLUTION INTRAVENOUS at 06:01

## 2020-08-07 RX ADMIN — PANTOPRAZOLE SODIUM 40 MG: 40 TABLET, DELAYED RELEASE ORAL at 06:29

## 2020-08-07 RX ADMIN — Medication 250 MG: at 08:55

## 2020-08-07 RX ADMIN — CEFEPIME HYDROCHLORIDE 2000 MG: 2 INJECTION, POWDER, FOR SOLUTION INTRAVENOUS at 11:34

## 2020-08-07 NOTE — ASSESSMENT & PLAN NOTE
57-year-old male nonsmoker with uncontrolled DM (HgbA1C 10), HTN, HLD, thyroid CA and GERD admitted with worsening are hallux gangrene with cellulitis and associated sepsis  Vascular surgery consulted for optimization of wound healing    Diagnostics:  -ALISA:  R popliteal occlusion  R SHANNAN 0 7/83/63; L SHANNAN 1 3/181/132  -MRI right foot 8/4/2020:  Osteomyelitis of the 1st distal phalanx  -x-ray right foot 8/4/2020:  No subcutaneous gas or lytic lesions  -Wound culture +GPC and  GNR    -BC 8/4 no growth at 48 hours    Plan:  -Right hallux gangrene and OM s/p angiogram with  popliteal with recanalization and stent angioplasty   -Excellent AT/DP/PT doppler signal   -Will check post intervention SHANNAN   -Continue ASA 81mg  Received loading dose of Plavix 300 mg  Start Plavix 75 mg daily for stent patency x6 months   -continue Lipitor for best medical management  -creatinine 0 75/  -continue local wound care per Podiatry  OK for podiatry to proceed with intervention  -continue antibiotics per ID    On Flagyl, Cathi Ivano   -Follow up as an outpatient placed on chart  -Will d/w Dr Joshua Galeas

## 2020-08-07 NOTE — ASSESSMENT & PLAN NOTE
Lab Results   Component Value Date    HGBA1C 9 7 (H) 08/05/2020       Recent Labs     08/06/20  1747 08/06/20  1823 08/06/20 2022 08/07/20  0739   POCGLU 125 121 132 112       Blood Sugar Average: Last 72 hrs:  (P) 180 [id number]   -uncontrolled  -continue to optimize BS control for optimization of wound healing and prevention of SSI  -management per SLIM

## 2020-08-07 NOTE — ASSESSMENT & PLAN NOTE
52-year-old male nonsmoker with uncontrolled DM (HgbA1C 10), HTN, HLD, thyroid CA and GERD admitted with worsening are hallux gangrene with cellulitis and associated sepsis  Vascular surgery consulted for optimization of wound healing    Diagnostics:  -ALISA:  R popliteal occlusion  R SHANNAN 0 7/83/63; L SHANNAN 1 3/181/132  -MRI right foot 8/4/2020:  Osteomyelitis of the 1st distal phalanx  -x-ray right foot 8/4/2020:  No subcutaneous gas or lytic lesions  -Wound culture +GPC and  GNR    -BC 8/4 no growth at 48 hours    Plan:  -Right hallux gangrene and OM s/p angiogram with  popliteal with recanalization and stent angioplasty   -Excellent AT/DP/PT doppler signal   -Will check post intervention SHANNAN   -Continue ASA 81mg  Received loading dose of Plavix 300 mg  Start Plavix 75 mg daily for stent patency   -continue Lipitor for best medical management  -creatinine 0 75/  -continue local wound care per Podiatry  OK for podiatry to proceed with intervention  -continue antibiotics per ID    On FlagMoncho davies Vanco   -Follow up as an outpatient placed on chart  -Will d/w Dr Navaror Cluster

## 2020-08-07 NOTE — PROGRESS NOTES
Progress Note - Koki Metcalf 1965, 60 y o  male MRN: [id number]    Unit/Bed#: -01 Encounter: [id number]    Primary Care Provider: Renae Georger, MD   Date and time admitted to hospital: 8/4/2020  1:33 PM        PAD (peripheral artery disease) [facility])  Assessment & Plan  64-year-old male nonsmoker with uncontrolled DM (HgbA1C 10), HTN, HLD, thyroid CA and GERD admitted with worsening are hallux gangrene with cellulitis and associated sepsis  Vascular surgery consulted for optimization of wound healing    Diagnostics:  -ALISA:  R popliteal occlusion  R SHANNAN 0 7/83/63; L SHANNAN 1 3/181/132  -MRI right foot 8/4/2020:  Osteomyelitis of the 1st distal phalanx  -x-ray right foot 8/4/2020:  No subcutaneous gas or lytic lesions  -Wound culture +GPC and  GNR    -BC 8/4 no growth at 48 hours    Plan:  -Right hallux gangrene and OM s/p angiogram with  popliteal with recanalization and stent angioplasty   -Excellent AT/DP/PT doppler signal   -Will check post intervention SHANNAN   -Continue ASA 81mg  Received loading dose of Plavix 300 mg  Start Plavix 75 mg daily for stent patency x6 months   -continue Lipitor for best medical management  -creatinine 0 75/  -continue local wound care per Podiatry  OK for podiatry to proceed with intervention  -continue antibiotics per ID  On Flagyl, Maxipime, Vanco   -Follow up as an outpatient placed on chart  -Will d/w Dr Mullen Chalo          Subjective:  Patient in bed  No acute distress  Status post angiogram yesterday  He denies any pain or discomfort  Left groin access site intact without bleeding or hematoma  T-max 100°    Vitals:  /80 (BP Location: Left arm)   Pulse 82   Temp 100 °F (37 8 °C) (Oral)   Resp 18   Ht 6' 4" (1 93 m)   Wt 113 kg (249 lb 1 9 oz)   SpO2 96%   BMI 30 32 kg/m²     I/Os:  I/O last 3 completed shifts:   In: 1058 8 [I V :1058 8]  Out: 1500 [Urine:1500]  I/O this shift:  In: 600 [P O :600]  Out: 600 [Urine:600]    Lab Results and Cultures:   Lab Results   Component Value Date    WBC 13 82 (H) 08/07/2020    HGB 11 2 (L) 08/07/2020    HCT 33 1 (L) 08/07/2020    MCV 91 08/07/2020     08/07/2020     Lab Results   Component Value Date    CALCIUM 8 3 08/07/2020    K 3 3 (L) 08/07/2020    CO2 27 08/07/2020     08/07/2020    BUN 14 08/07/2020    CREATININE 0 75 08/07/2020     Lab Results   Component Value Date    INR 1 04 08/04/2020    INR 0 99 11/15/2018    PROTIME 13 8 08/04/2020    PROTIME 13 0 11/15/2018        Blood Culture:   Lab Results   Component Value Date    BLOODCX No Growth at 48 hrs  08/04/2020   ,   Urinalysis:   Lab Results   Component Value Date    COLORU Yellow 09/27/2019    CLARITYU Clear 09/27/2019    SPECGRAV 1 020 09/27/2019    PHUR 5 5 09/27/2019    PHUR 6 0 11/15/2018    LEUKOCYTESUR (A) 09/27/2019     Elevated glucose may cause decreased leukocyte values   See urine microscopic for [facility] result/    NITRITE Negative 09/27/2019    GLUCOSEU >=1000 (1%) (A) 09/27/2019    KETONESU Negative 09/27/2019    BILIRUBINUR Negative 09/27/2019    BLOODU Moderate (A) 09/27/2019   ,   Urine Culture: No results found for: URINECX,   Wound Culure:   Lab Results   Component Value Date    WOUNDCULT 3+ Growth of Morganella morganii (A) 08/04/2020    WOUNDCULT 2+ Growth of Gram-negative mann- species (A) 08/04/2020    WOUNDCULT 2+ Growth of  08/04/2020       Medications:  Current Facility-Administered Medications   Medication Dose Route Frequency    acetaminophen (TYLENOL) tablet 650 mg  650 mg Oral Q6H PRN    aspirin chewable tablet 81 mg  81 mg Oral Daily    atorvastatin (LIPITOR) tablet 40 mg  40 mg Oral Daily With Dinner    cefepime (MAXIPIME) 2,000 mg in dextrose 5 % 50 mL IVPB  2,000 mg Intravenous Q8H    clopidogrel (PLAVIX) tablet 75 mg  75 mg Oral Daily    heparin (porcine) subcutaneous injection 5,000 Units  5,000 Units Subcutaneous Q8H [facility]    hydrOXYzine HCL (ATARAX) tablet 25 mg  25 mg Oral Q6H PRN    insulin glargine (LANTUS) subcutaneous injection 80 Units 0 8 mL  80 Units Subcutaneous HS    insulin lispro (HumaLOG) 100 units/mL subcutaneous injection 1-5 Units  1-5 Units Subcutaneous TID AC    insulin lispro (HumaLOG) 100 units/mL subcutaneous injection 1-5 Units  1-5 Units Subcutaneous HS    insulin lispro (HumaLOG) 100 units/mL subcutaneous injection 8 Units  8 Units Subcutaneous TID With Meals    metroNIDAZOLE (FLAGYL) IVPB (premix) 500 mg 100 mL  500 mg Intravenous Q8H    ondansetron (ZOFRAN) injection 4 mg  4 mg Intravenous Q6H PRN    pantoprazole (PROTONIX) EC tablet 40 mg  40 mg Oral Early Morning    saccharomyces boulardii (FLORASTOR) capsule 250 mg  250 mg Oral BID    sodium chloride 0 9 % infusion  75 mL/hr Intravenous Continuous    sodium chloride 0 9 % infusion  75 mL/hr Intravenous Continuous    verapamil (CALAN-SR) CR tablet 240 mg  240 mg Oral Daily       Imaging:  Angiogram 8/6 images reviewed with right popliteal occlusion, recanalization, supera stenting and angioplasty    Physical Exam:    General appearance: alert and oriented, in no acute distress  Skin: Skin color, texture, turgor normal  No rashes or lesions  Neurologic: Grossly normal  Head: Normocephalic, without obvious abnormality, atraumatic  Eyes: EOMI  Lungs: clear to auscultation bilaterally  Chest wall: no tenderness  Heart: regular rate and rhythm, S1, S2 normal, no murmur, click, rub or gallop  Abdomen: soft, non-tender; bowel sounds normal; no masses,  no organomegaly  Extremities: extremities normal, warm and well-perfused; no cyanosis, clubbing, or edema and R hallux gangrene, + mal odor     Wound/Incision:  R halux dressing clean, dry, and intact    Pulse exam:  DP: Right: doppler signal   PT: Right: doppler signal       NAILA Mijares  8/7/2020  [facility]  [phone]

## 2020-08-07 NOTE — ASSESSMENT & PLAN NOTE
-resolved  -creatinine 1 36/GFR 57 on admission  -creatinine 0 75/ post angiogram  -continue supportive care per primary medical service

## 2020-08-07 NOTE — PLAN OF CARE
Problem: Potential for Falls  Goal: Patient will remain free of falls  Description: INTERVENTIONS:  - Assess patient frequently for physical needs  -  Identify cognitive and physical deficits and behaviors that affect risk of falls    -  Beaver fall precautions as indicated by assessment   - Educate patient/family on patient safety including physical limitations  - Instruct patient to call for assistance with activity based on assessment  - Modify environment to reduce risk of injury  - Consider OT/PT consult to assist with strengthening/mobility  Outcome: Progressing     Problem: PAIN - ADULT  Goal: Verbalizes/displays adequate comfort level or baseline comfort level  Description: Interventions:  - Encourage patient to monitor pain and request assistance  - Assess pain using appropriate pain scale  - Administer analgesics based on type and severity of pain and evaluate response  - Implement non-pharmacological measures as appropriate and evaluate response  - Consider cultural and social influences on pain and pain management  - Notify physician/advanced practitioner if interventions unsuccessful or patient reports new pain  Outcome: Progressing     Problem: INFECTION - ADULT  Goal: Absence or prevention of progression during hospitalization  Description: INTERVENTIONS:  - Assess and monitor for signs and symptoms of infection  - Monitor lab/diagnostic results  - Monitor all insertion sites, i e  indwelling lines, tubes, and drains  - Monitor endotracheal if appropriate and nasal secretions for changes in amount and color  - Beaver appropriate cooling/warming therapies per order  - Administer medications as ordered  - Instruct and encourage patient and family to use good hand hygiene technique  - Identify and instruct in appropriate isolation precautions for identified infection/condition  Outcome: Progressing  Goal: Absence of fever/infection during neutropenic period  Description: INTERVENTIONS:  - Monitor WBC    Outcome: Progressing     Problem: SAFETY ADULT  Goal: Patient will remain free of falls  Description: INTERVENTIONS:  - Assess patient frequently for physical needs  -  Identify cognitive and physical deficits and behaviors that affect risk of falls    -  Sims fall precautions as indicated by assessment   - Educate patient/family on patient safety including physical limitations  - Instruct patient to call for assistance with activity based on assessment  - Modify environment to reduce risk of injury  - Consider OT/PT consult to assist with strengthening/mobility  Outcome: Progressing  Goal: Maintain or return to baseline ADL function  Description: INTERVENTIONS:  -  Assess patient's ability to carry out ADLs; assess patient's baseline for ADL function and identify physical deficits which impact ability to perform ADLs (bathing, care of mouth/teeth, toileting, grooming, dressing, etc )  - Assess/evaluate cause of self-care deficits   - Assess range of motion  - Assess patient's mobility; develop plan if impaired  - Assess patient's need for assistive devices and provide as appropriate  - Encourage maximum independence but intervene and supervise when necessary  - Involve family in performance of ADLs  - Assess for home care needs following discharge   - Consider OT consult to assist with ADL evaluation and planning for discharge  - Provide patient education as appropriate  Outcome: Progressing  Goal: Maintain or return mobility status to optimal level  Description: INTERVENTIONS:  - Assess patient's baseline mobility status (ambulation, transfers, stairs, etc )    - Identify cognitive and physical deficits and behaviors that affect mobility  - Identify mobility aids required to assist with transfers and/or ambulation (gait belt, sit-to-stand, lift, walker, cane, etc )  - Sims fall precautions as indicated by assessment  - Record patient progress and toleration of activity level on Mobility SBAR; progress patient to next Phase/Stage  - Instruct patient to call for assistance with activity based on assessment  - Consider rehabilitation consult to assist with strengthening/weightbearing, etc   Outcome: Progressing     Problem: DISCHARGE PLANNING  Goal: Discharge to home or other facility with appropriate resources  Description: INTERVENTIONS:  - Identify barriers to discharge w/patient and caregiver  - Arrange for needed discharge resources and transportation as appropriate  - Identify discharge learning needs (meds, wound care, etc )  - Arrange for interpretive services to assist at discharge as needed  - Refer to Case Management Department for coordinating discharge planning if the patient needs post-hospital services based on physician/advanced practitioner order or complex needs related to functional status, cognitive ability, or social support system  Outcome: Progressing     Problem: Knowledge Deficit  Goal: Patient/family/caregiver demonstrates understanding of disease process, treatment plan, medications, and discharge instructions  Description: Complete learning assessment and assess knowledge base  Interventions:  - Provide teaching at level of understanding  - Provide teaching via preferred learning methods  Outcome: Progressing     Problem: Nutrition/Hydration-ADULT  Goal: Nutrient/Hydration intake appropriate for improving, restoring or maintaining nutritional needs  Description: Monitor and assess patient's nutrition/hydration status for malnutrition  Collaborate with interdisciplinary team and initiate plan and interventions as ordered  Monitor patient's weight and dietary intake as ordered or per policy  Utilize nutrition screening tool and intervene as necessary  Determine patient's food preferences and provide high-protein, high-caloric foods as appropriate       INTERVENTIONS:  - Monitor oral intake, urinary output, labs, and treatment plans  - Assess nutrition and hydration status and recommend course of action  - Evaluate amount of meals eaten  - Assist patient with eating if necessary   - Allow adequate time for meals  - Recommend/ encourage appropriate diets, oral nutritional supplements, and vitamin/mineral supplements  - Order, calculate, and assess calorie counts as needed  - Recommend, monitor, and adjust tube feedings based on assessed needs  - Assess need for intravenous fluids  - Provide nutrition/hydration education as appropriate  - Include patient/family/caregiver in decisions related to nutrition  Outcome: Progressing

## 2020-08-07 NOTE — PROGRESS NOTES
Progress Note - Infectious Disease   Roma Soto 60 y o  male MRN: [id number]  Unit/Bed#: -01 Encounter: [id number]      Impression/Plan:  1  Sepsis, POA   Patient presented on admission with leukocytosis and tachycardia   Source of infection is problem 2  No other obvious sources on exam  Blessing Hernandez is fortunately hemodynamically stable  Antibiotics adjusted as below  Continue to trend fever curve/vitals  Repeat CBC/chemistry tomorrow  Follow up pending culture data  Additional supportive care as per primary      2  Gangrene of right foot great toe with osteomyelitis   Patient has had clinical progression of a previous injury approximately 8 weeks ago where he reports that the bone was exposed at that time   MRI confirms osteomyelitis   Exam consistent with severe necrosis/tissue death and suspected superinfection with foul-smelling drainage  Cultures so far with multiple GNRs with resistance to Ancef  Planned for amputation on this admission  Switch to cefepime high-dose  Continue Flagyl  Continue to trend fever curve/vitals  Repeat CBC/chemistry tomorrow  Follow up pending culture data  Ongoing follow-up by vascular  Ongoing follow-up by Podiatry  Additional supportive care as per primary  Duration of therapy pending clinical course     3  Acute kidney injury   Acute elevation in creatinine noted from baseline   Suspect that this was due to acute illness   Has since down trended   Impacts antibiotic dosing  Maintain cefepime at high-dose  Continue to monitor chemistry closely  Will further dose adjust antibiotic as needed     4  Poorly controlled diabetes with neuropathy and vascular disease   Patient noted with hemoglobin A1c of 10  He also has underlying neuropathy as well as peripheral vascular disease   Unfortunately these contribute to poor wound healing and infection as above  Status post angiogram on 08/06    Ongoing glucose management as per primary  Ongoing follow-up by vascular surgery  Antibiotics as above      5  Obesity   Patient noted with BMI of 30  Can impact antibiotic efficacy/dosing  Higher doses of cefepime as above  Recommend discussion of weight loss as outpatient      6  Thyroid cancer   Patient recently diagnosed with thyroid cancer in July   Currently not on any chemotherapy   Recommend follow-up with oncology/endocrinology as outpatient      Above plan discussed in detail with the patient at bedside  Primary service attending updated of the above plan      ID consult service will formally re-evaluate this patient again on Monday  Please contact ID attending on call if questions  Antibiotics:  Cefepime 1  Flagyl 3  Total antibiotic 4    24 hour events:  Podiatry evaluation noted  Patient had IR intervention yesterday  Patient remains afebrile  White blood cell count 13 8  Wound cultures pending identification  Patient's other vitals are stable  Labs otherwise unremarkable    Subjective:  Patient currently denies having any nausea vomiting, chest pain or shortness of breath  He believes that his amputation will happen over the weekend or possibly Monday  He has no other acute complaints at this time  Objective:  Vitals:  Temp:  [98 °F (36 7 °C)-100 °F (37 8 °C)] 100 °F (37 8 °C)  HR:  [64-84] 82  Resp:  [14-23] 18  BP: (110-157)/(56-86) 157/80  SpO2:  [96 %-100 %] 96 %  Temp (24hrs), Av 8 °F (37 1 °C), Min:98 °F (36 7 °C), Max:100 °F (37 8 °C)  Current: Temperature: 100 °F (37 8 °C)    Physical Exam:   General Appearance:  Alert, interactive, nontoxic, no acute distress  Throat: Oropharynx moist without lesions  Poor dentition noted  Lungs:   Clear to auscultation bilaterally; no wheezes, rhonchi or rales; respirations unlabored on room air   Heart:  RRR; no murmur, rub or gallop appreciated   Abdomen:   Soft, non-tender, non-distended, positive bowel sounds  Extremities: No clubbing, cyanosis or edema   Skin: No new rashes or lesions   No new draining wounds noted  Foot wound is currently wrapped at this time but there remains a significant foul smell  Labs, Imaging, & Other studies:   All pertinent labs and imaging studies were personally reviewed  Results from last 7 days   Lab Units 08/07/20  0624 08/06/20  1155 08/05/20  0506   WBC Thousand/uL 13 82* 14 02* 14 74*   HEMOGLOBIN g/dL 11 2* 11 7* 11 6*   PLATELETS Thousands/uL 340 311 297     Results from last 7 days   Lab Units 08/07/20  0624  08/04/20  1358   POTASSIUM mmol/L 3 3*   < > 4 7   CHLORIDE mmol/L 101   < > 93*   CO2 mmol/L 27   < > 23   BUN mg/dL 14   < > 33*   CREATININE mg/dL 0 75   < > 1 36*   EGFR ml/min/1 73sq m 102   < > 57   CALCIUM mg/dL 8 3   < > 9 3   AST U/L  --   --  31   ALT U/L  --   --  39   ALK PHOS U/L  --   --  95    < > = values in this interval not displayed  Results from last 7 days   Lab Units 08/04/20  1837 08/04/20  1410 08/04/20  1358   BLOOD CULTURE   --  No Growth at 48 hrs  No Growth at 48 hrs     GRAM STAIN RESULT  2+ Gram positive cocci in pairs and chains*  1+ Gram negative rods*  No polys seen*  --   --    WOUND CULTURE  3+ Growth of Non lactose fermenting gram negative mann*  --   --

## 2020-08-07 NOTE — ASSESSMENT & PLAN NOTE
-local wound care per Podiatry  -status post angioplasty and recanalization of a complete total occlusion of right popliteal artery  -Okay to proceed with podiatry intervention  -continue antibiotics  -blood cultures:  No growth 48 hrs

## 2020-08-08 LAB
ANION GAP SERPL CALCULATED.3IONS-SCNC: 9 MMOL/L (ref 4–13)
BACTERIA WND AEROBE CULT: ABNORMAL
BUN SERPL-MCNC: 11 MG/DL (ref 5–25)
CALCIUM SERPL-MCNC: 8.6 MG/DL (ref 8.3–10.1)
CHLORIDE SERPL-SCNC: 102 MMOL/L (ref 100–108)
CO2 SERPL-SCNC: 27 MMOL/L (ref 21–32)
CREAT SERPL-MCNC: 0.76 MG/DL (ref 0.6–1.3)
ERYTHROCYTE [DISTWIDTH] IN BLOOD BY AUTOMATED COUNT: 11.4 % (ref 11.6–15.1)
GFR SERPL CREATININE-BSD FRML MDRD: 101 ML/MIN/1.73SQ M
GLUCOSE SERPL-MCNC: 112 MG/DL (ref 65–140)
GLUCOSE SERPL-MCNC: 125 MG/DL (ref 65–140)
GLUCOSE SERPL-MCNC: 152 MG/DL (ref 65–140)
GLUCOSE SERPL-MCNC: 165 MG/DL (ref 65–140)
GLUCOSE SERPL-MCNC: 195 MG/DL (ref 65–140)
GRAM STN SPEC: ABNORMAL
HCT VFR BLD AUTO: 35.8 % (ref 36.5–49.3)
HGB BLD-MCNC: 12.1 G/DL (ref 12–17)
MCH RBC QN AUTO: 30.7 PG (ref 26.8–34.3)
MCHC RBC AUTO-ENTMCNC: 33.8 G/DL (ref 31.4–37.4)
MCV RBC AUTO: 91 FL (ref 82–98)
PLATELET # BLD AUTO: 361 THOUSANDS/UL (ref 149–390)
PMV BLD AUTO: 10.7 FL (ref 8.9–12.7)
POTASSIUM SERPL-SCNC: 3.5 MMOL/L (ref 3.5–5.3)
RBC # BLD AUTO: 3.94 MILLION/UL (ref 3.88–5.62)
SODIUM SERPL-SCNC: 138 MMOL/L (ref 136–145)
WBC # BLD AUTO: 10.63 THOUSAND/UL (ref 4.31–10.16)

## 2020-08-08 PROCEDURE — 82948 REAGENT STRIP/BLOOD GLUCOSE: CPT

## 2020-08-08 PROCEDURE — 80048 BASIC METABOLIC PNL TOTAL CA: CPT | Performed by: INTERNAL MEDICINE

## 2020-08-08 PROCEDURE — 85027 COMPLETE CBC AUTOMATED: CPT | Performed by: INTERNAL MEDICINE

## 2020-08-08 PROCEDURE — 99232 SBSQ HOSP IP/OBS MODERATE 35: CPT | Performed by: INTERNAL MEDICINE

## 2020-08-08 RX ADMIN — VERAPAMIL HYDROCHLORIDE 240 MG: 120 TABLET, FILM COATED, EXTENDED RELEASE ORAL at 09:56

## 2020-08-08 RX ADMIN — INSULIN LISPRO 8 UNITS: 100 INJECTION, SOLUTION INTRAVENOUS; SUBCUTANEOUS at 16:40

## 2020-08-08 RX ADMIN — HEPARIN SODIUM 5000 UNITS: 5000 INJECTION INTRAVENOUS; SUBCUTANEOUS at 16:38

## 2020-08-08 RX ADMIN — ATORVASTATIN CALCIUM 40 MG: 40 TABLET, FILM COATED ORAL at 16:35

## 2020-08-08 RX ADMIN — CEFEPIME HYDROCHLORIDE 2000 MG: 2 INJECTION, POWDER, FOR SOLUTION INTRAVENOUS at 12:00

## 2020-08-08 RX ADMIN — METRONIDAZOLE 500 MG: 500 INJECTION, SOLUTION INTRAVENOUS at 16:45

## 2020-08-08 RX ADMIN — Medication 250 MG: at 09:56

## 2020-08-08 RX ADMIN — ASPIRIN 81 MG 81 MG: 81 TABLET ORAL at 09:56

## 2020-08-08 RX ADMIN — CLOPIDOGREL BISULFATE 75 MG: 75 TABLET ORAL at 09:57

## 2020-08-08 RX ADMIN — INSULIN LISPRO 1 UNITS: 100 INJECTION, SOLUTION INTRAVENOUS; SUBCUTANEOUS at 16:40

## 2020-08-08 RX ADMIN — INSULIN LISPRO 8 UNITS: 100 INJECTION, SOLUTION INTRAVENOUS; SUBCUTANEOUS at 12:24

## 2020-08-08 RX ADMIN — HEPARIN SODIUM 5000 UNITS: 5000 INJECTION INTRAVENOUS; SUBCUTANEOUS at 06:23

## 2020-08-08 RX ADMIN — METRONIDAZOLE 500 MG: 500 INJECTION, SOLUTION INTRAVENOUS at 02:48

## 2020-08-08 RX ADMIN — HEPARIN SODIUM 5000 UNITS: 5000 INJECTION INTRAVENOUS; SUBCUTANEOUS at 22:09

## 2020-08-08 RX ADMIN — CEFEPIME HYDROCHLORIDE 2000 MG: 2 INJECTION, POWDER, FOR SOLUTION INTRAVENOUS at 19:16

## 2020-08-08 RX ADMIN — SODIUM CHLORIDE 75 ML/HR: 0.9 INJECTION, SOLUTION INTRAVENOUS at 06:24

## 2020-08-08 RX ADMIN — INSULIN LISPRO 1 UNITS: 100 INJECTION, SOLUTION INTRAVENOUS; SUBCUTANEOUS at 22:09

## 2020-08-08 RX ADMIN — PANTOPRAZOLE SODIUM 40 MG: 40 TABLET, DELAYED RELEASE ORAL at 06:25

## 2020-08-08 RX ADMIN — SODIUM CHLORIDE 75 ML/HR: 0.9 INJECTION, SOLUTION INTRAVENOUS at 23:42

## 2020-08-08 RX ADMIN — INSULIN GLARGINE 80 UNITS: 100 INJECTION, SOLUTION SUBCUTANEOUS at 22:09

## 2020-08-08 RX ADMIN — CEFEPIME HYDROCHLORIDE 2000 MG: 2 INJECTION, POWDER, FOR SOLUTION INTRAVENOUS at 02:48

## 2020-08-08 RX ADMIN — ONDANSETRON 4 MG: 2 INJECTION INTRAMUSCULAR; INTRAVENOUS at 09:54

## 2020-08-08 RX ADMIN — METRONIDAZOLE 500 MG: 500 INJECTION, SOLUTION INTRAVENOUS at 09:57

## 2020-08-08 RX ADMIN — Medication 250 MG: at 19:16

## 2020-08-08 NOTE — PROGRESS NOTES
Progress Note - Mckinley Stoner 1965, 60 y o  male MRN: [id number]    Unit/Bed#: -01 Encounter: [id number]    Primary Care Provider: Sukhdev Baptiste MD   Date and time admitted to hospital: 8/4/2020  1:33 PM        PAD (peripheral artery disease) [facility])  Assessment & Plan  Right popliteal occlusion  s/p IR angioplasty  Now cleared for amputation    Hyponatremia  Assessment & Plan  Pseudohyponatremia due to hyperglycemia  Resolved    Acute kidney injury ([address] )  Assessment & Plan  Creatinine 1 36  Baseline creatinine around 0 8  Improved with IV hydration  Creatinine 0 92 today  Started on IV hydration again because of angioplasty    Results from last 7 days   Lab Units 08/07/20  0624 08/06/20  1151 08/05/20  0506 08/04/20  1358   BUN mg/dL 14 12 23 33*   CREATININE mg/dL 0 75 0 92 0 92 1 36*       Severe sepsis (HCC)  Assessment & Plan  POA, as evidenced by leukocytosis, tachycardia, lactic acidosis, with source of infection right great toe gangrene with cellulitis  Lactic acid 3 1 in ED  Normalized after receiving 2000 mL NS in ED  IV antibiotic as above  Blood cultures x2 no growth so far    Multiple thyroid nodules  Assessment & Plan  Status post thyroid biopsy  Outpatient follow-up with PCP    Type 2 diabetes mellitus with hyperglycemia, with long-term current use of insulin [facility])  Assessment & Plan  Lab Results   Component Value Date    HGBA1C 9 7 (H) 08/05/2020       Recent Labs     08/07/20  0739 08/07/20  1100 08/07/20  1628 08/07/20  2117   POCGLU 112 143* 157* 172*       Blood Sugar Average: Last 72 hrs:  (P) 049 [id number]   Poorly controlled type 2 diabetes  On Lantus 80 units HS and Apidra 40 units t i d  With meals at home  Will continue Lantus  Decrease mealtime insulin to 8 units t i d   SSI  Diabetic diet  Blood sugar acceptable    Obesity, Class I, BMI 30 0-34 9 (see actual BMI)  Assessment & Plan  Body mass index is 30 32 kg/m²    Diet and lifestyle modification    Dyslipidemia, goal LDL below 100  Assessment & Plan  Not on medication at home  LDL high, started on statin    Gastroesophageal reflux disease without esophagitis  Assessment & Plan  Continue PPI    Hypertension, essential  Assessment & Plan  BP acceptable  Hold Avalide  Continue verapamil  Monitor BP    * Gangrene of toe of right foot (HCC)  Assessment & Plan  Gangrene of right great toe and hallux with cellulitis  Reports foul-smelling discharge from the toe for 1 week and progressively worsening discoloration to right great toe  Started on Augmentin on Thursday last week by podiatrist   X-ray right foot showed possible osteomyelitis  MRI foot Osteomyelitis of the distal portion of the first distal phalanx  Patient received IV Vanco, cefepime in ED  Evaluated by ID and antibiotic changed to IV Ancef and Flagyl  Patient seen by podiatry, appreciate input  Patient will need surgery later this week  Has popliteal artery occlusion, plan for angioplasty today by IR  Vascular surgery on board also  Patient was evaluated by Dr Mey Courtney  VTE Pharmacologic Prophylaxis:   Pharmacologic: Heparin  Mechanical VTE Prophylaxis in Place: Yes    Patient Centered Rounds: I have performed bedside rounds with nursing staff today  Discussions with Specialists or Other Care Team Provider:  Vascular surgery    Education and Discussions with Family / Patient:     Time Spent for Care: More than 50% of total time spent on counseling and coordination of care as described above  Current Length of Stay: 3 day(s)    Current Patient Status: Inpatient   Certification Statement: The patient will continue to require additional inpatient hospital stay due to See above    Discharge Plan:  72 hours    Code Status: No Order      Subjective:   I have seen and examined the patient bedside this  Patient denies any new complaints  He had angioplasty yesterday  Afebrile      Objective:     Vitals:   Temp (24hrs), Av 3 °F (37 4 °C), Min:98 °F (36 7 °C), Max:100 °F (37 8 °C)    Temp:  [98 °F (36 7 °C)-100 °F (37 8 °C)] 99 8 °F (37 7 °C)  HR:  [71-84] 73  Resp:  [18-20] 18  BP: (120-157)/(69-80) 143/78  SpO2:  [95 %-98 %] 96 %  Body mass index is 30 32 kg/m²  Input and Output Summary (last 24 hours): Intake/Output Summary (Last 24 hours) at 2020 2208  Last data filed at 2020 1823  Gross per 24 hour   Intake 1820 ml   Output 1150 ml   Net 670 ml       Physical Exam:     Physical Exam  General- Awake, alert and oriented x 3, looks comfortable  HEENT- Normocephalic, atraumatic  CVS- Normal S1/ S2, Regular rate and rhythm  Respiratory system- B/L clear breath sounds, no wheezing  Abdomen- Soft, Non distended, no tenderness, Bowel sound- present  Musculoskeletal-right great toe gangrene  CNS-  No acute focal neurologic deficit noted    Additional Data:     Labs:    Results from last 7 days   Lab Units 20  0624   WBC Thousand/uL 13 82*   HEMOGLOBIN g/dL 11 2*   HEMATOCRIT % 33 1*   PLATELETS Thousands/uL 340   NEUTROS PCT % 72   LYMPHS PCT % 15   MONOS PCT % 10   EOS PCT % 1     Results from last 7 days   Lab Units 20  0624  20  1358   SODIUM mmol/L 136   < > 129*   POTASSIUM mmol/L 3 3*   < > 4 7   CHLORIDE mmol/L 101   < > 93*   CO2 mmol/L 27   < > 23   BUN mg/dL 14   < > 33*   CREATININE mg/dL 0 75   < > 1 36*   ANION GAP mmol/L 8   < > 13   CALCIUM mg/dL 8 3   < > 9 3   ALBUMIN g/dL  --   --  3 1*   TOTAL BILIRUBIN mg/dL  --   --  1 20*   ALK PHOS U/L  --   --  95   ALT U/L  --   --  39   AST U/L  --   --  31   GLUCOSE RANDOM mg/dL 127   < > 365*    < > = values in this interval not displayed       Results from last 7 days   Lab Units 20  1358   INR  1 04     Results from last 7 days   Lab Units 20  2117 20  1628 20  1100 20  0739 20  2022 20  1823 20  1747 20  1125 20  0758 20  1558 20  1103 20  0737   POC GLUCOSE mg/dl 172* 157* 143* 112 132 121 125 158* 157* 254* 159* 176*     Results from last 7 days   Lab Units 08/05/20  0506   HEMOGLOBIN A1C % 9 7*     Results from last 7 days   Lab Units 08/05/20  0506 08/04/20  1709 08/04/20  1358   LACTIC ACID mmol/L  --  1 9 3 1*   PROCALCITONIN ng/ml 0 13  --   --            * I Have Reviewed All Lab Data Listed Above  * Additional Pertinent Lab Tests Reviewed: All Labs Within Last 24 Hours Reviewed    Imaging:    Imaging Reports Reviewed Today Include:   Imaging Personally Reviewed by Myself Includes:      Recent Cultures (last 7 days):     Results from last 7 days   Lab Units 08/04/20  1837 08/04/20  1410 08/04/20  1358   BLOOD CULTURE   --  No Growth at 72 hrs  No Growth at 72 hrs     GRAM STAIN RESULT  2+ Gram positive cocci in pairs and chains*  1+ Gram negative rods*  No polys seen*  --   --    WOUND CULTURE  3+ Growth of Morganella morganii*  2+ Growth of Citrobacter freundii*  2+ Growth of Enterobacter cloacae complex*  2+ Growth of   --   --        Last 24 Hours Medication List:   Current Facility-Administered Medications   Medication Dose Route Frequency Provider Last Rate    acetaminophen  650 mg Oral Q6H PRN NAILA Suarez      aspirin  81 mg Oral Daily Skip Stokes PA-C      atorvastatin  40 mg Oral Daily With NAILA Grant      cefepime  2,000 mg Intravenous Q8H Jannette Sims MD 2,000 mg (08/07/20 1823)    clopidogrel  75 mg Oral Daily NAILA Raines      heparin (porcine)  5,000 Units Subcutaneous Q8H [address] Estefanía Amaya MD      hydrOXYzine HCL  25 mg Oral Q6H PRN NAILA Alonso      insulin glargine  80 Units Subcutaneous HS NAILA Suarez      insulin lispro  1-5 Units Subcutaneous TID AC NAILA Suarez      insulin lispro  1-5 Units Subcutaneous HS NAILA Suarez      insulin lispro  8 Units Subcutaneous TID With Meals NAILA Suarez      metroNIDAZOLE  500 mg Intravenous Q8H Cade Chambers CRNP 500 mg (08/07/20 1659)    ondansetron  4 mg Intravenous Q6H PRN Dariela Dykes, NAILA      pantoprazole  40 mg Oral Early Morning Cuikaycee Gillisk, NAILA      saccharomyces boulardii  250 mg Oral BID Cuikaycee Dykes, RICARDONP      sodium chloride  75 mL/hr Intravenous Continuous RICARDO SuarezNP 75 mL/hr (08/06/20 0527)    sodium chloride  75 mL/hr Intravenous Continuous RICARDO VacaNP 75 mL/hr (08/07/20 1617)    verapamil  240 mg Oral Daily NAILA Suarez          Today, Patient Was Seen By: Pedrito Malik MD    ** Please Note: Dictation voice to text software may have been used in the creation of this document   **

## 2020-08-08 NOTE — ASSESSMENT & PLAN NOTE
Creatinine 1 36  Baseline creatinine around 0 8  Improved with IV hydration    Creatinine 0 76 today  Started on IV hydration again because of angioplasty    Results from last 7 days   Lab Units 08/08/20  0538 08/07/20  0624 08/06/20  1151 08/05/20  0506 08/04/20  1358   BUN mg/dL 11 14 12 23 33*   CREATININE mg/dL 0 76 0 75 0 92 0 92 1 36*

## 2020-08-08 NOTE — ASSESSMENT & PLAN NOTE
Gangrene of right great toe and hallux with cellulitis  Reports foul-smelling discharge from the toe for 1 week and progressively worsening discoloration to right great toe  Started on Augmentin on Thursday last week by podiatrist   X-ray right foot showed possible osteomyelitis  MRI foot Osteomyelitis of the distal portion of the first distal phalanx  Patient received IV Vanco, cefepime in ED  Evaluated by ID and antibiotic changed to IV Ancef and Flagyl  Patient seen by podiatry, appreciate input  Patient will need surgery later this week  Has popliteal artery occlusion, plan for angioplasty today by IR  Vascular surgery on board also  Patient was evaluated by Dr Greg Boykin

## 2020-08-08 NOTE — ASSESSMENT & PLAN NOTE
Lab Results   Component Value Date    HGBA1C 9 7 (H) 08/05/2020       Recent Labs     08/07/20  1628 08/07/20  2117 08/08/20  0747 08/08/20  1115   POCGLU 157* 172* 125 112       Blood Sugar Average: Last 72 hrs:  (P) 983 [id number]   Poorly controlled type 2 diabetes  On Lantus 80 units HS and Apidra 40 units t i d  With meals at home  Will continue Lantus    Decrease mealtime insulin to 8 units t i d   SSI  Diabetic diet  Blood sugar acceptable

## 2020-08-08 NOTE — PROGRESS NOTES
Progress Note - Charles Dobbs 1965, 60 y o  male MRN: [id number]    Unit/Bed#: -01 Encounter: [id number]    Primary Care Provider: Rusty Mcclellan MD   Date and time admitted to hospital: 8/4/2020  1:33 PM        PAD (peripheral artery disease) [facility])  Assessment & Plan  Right popliteal occlusion  s/p IR angioplasty  Now cleared for amputation    Hyponatremia  Assessment & Plan  Pseudohyponatremia due to hyperglycemia  Resolved    Acute kidney injury ([address] )  Assessment & Plan  Creatinine 1 36  Baseline creatinine around 0 8  Improved with IV hydration  Creatinine 0 76 today  Started on IV hydration again because of angioplasty    Results from last 7 days   Lab Units 08/08/20  0538 08/07/20  0624 08/06/20  1151 08/05/20  0506 08/04/20  1358   BUN mg/dL 11 14 12 23 33*   CREATININE mg/dL 0 76 0 75 0 92 0 92 1 36*       Severe sepsis (HCC)  Assessment & Plan  POA, as evidenced by leukocytosis, tachycardia, lactic acidosis, with source of infection right great toe gangrene with cellulitis  Lactic acid 3 1 in ED  Normalized after receiving 2000 mL NS in ED  IV antibiotic as above  Blood cultures x2 no growth so far    Multiple thyroid nodules  Assessment & Plan  Status post thyroid biopsy  Outpatient follow-up with PCP    Type 2 diabetes mellitus with hyperglycemia, with long-term current use of insulin [facility])  Assessment & Plan  Lab Results   Component Value Date    HGBA1C 9 7 (H) 08/05/2020       Recent Labs     08/07/20  1628 08/07/20  2117 08/08/20  0747 08/08/20  1115   POCGLU 157* 172* 125 112       Blood Sugar Average: Last 72 hrs:  (P) 150 [id number]   Poorly controlled type 2 diabetes  On Lantus 80 units HS and Apidra 40 units t i d  With meals at home  Will continue Lantus  Decrease mealtime insulin to 8 units t i d   SSI  Diabetic diet  Blood sugar acceptable    Obesity, Class I, BMI 30 0-34 9 (see actual BMI)  Assessment & Plan  Body mass index is 30 32 kg/m²    Diet and lifestyle modification    Dyslipidemia, goal LDL below 100  Assessment & Plan  Not on medication at home  LDL high, started on statin    Gastroesophageal reflux disease without esophagitis  Assessment & Plan  Continue PPI    Hypertension, essential  Assessment & Plan  BP acceptable  Hold Avalide  Continue verapamil  Monitor BP    * Gangrene of toe of right foot (HCC)  Assessment & Plan  Gangrene of right great toe and hallux with cellulitis  Reports foul-smelling discharge from the toe for 1 week and progressively worsening discoloration to right great toe  Started on Augmentin on Thursday last week by podiatrist   X-ray right foot showed possible osteomyelitis  MRI foot Osteomyelitis of the distal portion of the first distal phalanx  Patient received IV Vanco, cefepime in ED  Evaluated by ID and antibiotic changed to IV cefepime and Flagyl now  Patient seen by podiatry, appreciate input  Patient will need amputation which is scheduled on Monday  Has popliteal artery occlusion, plan for angioplasty today by IR  Vascular surgery on board also  VTE Pharmacologic Prophylaxis:   Pharmacologic: Enoxaparin (Lovenox)  Mechanical VTE Prophylaxis in Place: Yes    Patient Centered Rounds: I have performed bedside rounds with nursing staff today  Discussions with Specialists or Other Care Team Provider:     Education and Discussions with Family / Patient:  Patient    Time Spent for Care: More than 50% of total time spent on counseling and coordination of care as described above  Current Length of Stay: 4 day(s)    Current Patient Status: Inpatient   Certification Statement: The patient will continue to require additional inpatient hospital stay due to See above    Discharge Plan:  3-4 days    Code Status: No Order      Subjective:   I have seen and examined the patient bedside this morning  Patient denies any complaints  Chest pain, palpitation or shortness of breath    Status post angioplasty of right popliteal artery  Plan for her right great toe amputation earlier next week    Objective:     Vitals:   Temp (24hrs), Av 4 °F (37 4 °C), Min:98 9 °F (37 2 °C), Max:99 8 °F (37 7 °C)    Temp:  [98 9 °F (37 2 °C)-99 8 °F (37 7 °C)] 98 9 °F (37 2 °C)  HR:  [71-73] 72  Resp:  [18-20] 18  BP: (120-143)/(69-87) 136/87  SpO2:  [95 %-96 %] 96 %  Body mass index is 30 32 kg/m²  Input and Output Summary (last 24 hours): Intake/Output Summary (Last 24 hours) at 2020 1438  Last data filed at 2020 1342  Gross per 24 hour   Intake 1160 ml   Output 1990 ml   Net -830 ml       Physical Exam:     Physical Exam  General- Awake, alert and oriented x 3, looks comfortable  HEENT- Normocephalic, atraumatic  CVS- Normal S1/ S2, Regular rate and rhythm  Respiratory system- B/L clear breath sounds, no wheezing  Abdomen- Soft, Non distended, no tenderness, Bowel sound- present  Musculoskeletal- right great toe gangrenous  CNS- No acute focal neurologic deficit noted    Additional Data:     Labs:    Results from last 7 days   Lab Units 20  0538 20  0624   WBC Thousand/uL 10 63* 13 82*   HEMOGLOBIN g/dL 12 1 11 2*   HEMATOCRIT % 35 8* 33 1*   PLATELETS Thousands/uL 361 340   NEUTROS PCT %  --  72   LYMPHS PCT %  --  15   MONOS PCT %  --  10   EOS PCT %  --  1     Results from last 7 days   Lab Units 20  0538  20  1358   SODIUM mmol/L 138   < > 129*   POTASSIUM mmol/L 3 5   < > 4 7   CHLORIDE mmol/L 102   < > 93*   CO2 mmol/L 27   < > 23   BUN mg/dL 11   < > 33*   CREATININE mg/dL 0 76   < > 1 36*   ANION GAP mmol/L 9   < > 13   CALCIUM mg/dL 8 6   < > 9 3   ALBUMIN g/dL  --   --  3 1*   TOTAL BILIRUBIN mg/dL  --   --  1 20*   ALK PHOS U/L  --   --  95   ALT U/L  --   --  39   AST U/L  --   --  31   GLUCOSE RANDOM mg/dL 165*   < > 365*    < > = values in this interval not displayed       Results from last 7 days   Lab Units 20  1358   INR  1 04     Results from last 7 days   Lab Units 08/08/20  1115 08/08/20  0747 08/07/20  2117 08/07/20  1628 08/07/20  1100 08/07/20  0739 08/06/20  2022 08/06/20  1823 08/06/20  1747 08/06/20  1125 08/06/20  0758 08/05/20  1558   POC GLUCOSE mg/dl 112 125 172* 157* 143* 112 132 121 125 158* 157* 254*     Results from last 7 days   Lab Units 08/05/20  0506   HEMOGLOBIN A1C % 9 7*     Results from last 7 days   Lab Units 08/05/20  0506 08/04/20  1709 08/04/20  1358   LACTIC ACID mmol/L  --  1 9 3 1*   PROCALCITONIN ng/ml 0 13  --   --            * I Have Reviewed All Lab Data Listed Above  * Additional Pertinent Lab Tests Reviewed: All Labs Within Last 24 Hours Reviewed    Imaging:    Imaging Reports Reviewed Today Include:   Imaging Personally Reviewed by Myself Includes:      Recent Cultures (last 7 days):     Results from last 7 days   Lab Units 08/04/20  1837 08/04/20  1410 08/04/20  1358   BLOOD CULTURE   --  No Growth at 72 hrs  No Growth at 72 hrs     GRAM STAIN RESULT  2+ Gram positive cocci in pairs and chains*  1+ Gram negative rods*  No polys seen*  --   --    WOUND CULTURE  3+ Growth of Morganella morganii*  2+ Growth of Citrobacter freundii*  2+ Growth of Enterobacter cloacae complex*  2+ Growth of   --   --        Last 24 Hours Medication List:   Current Facility-Administered Medications   Medication Dose Route Frequency Provider Last Rate    acetaminophen  650 mg Oral Q6H PRN NAILA Suarez      aspirin  81 mg Oral Daily Kelby Meckel, PA-C      atorvastatin  40 mg Oral Daily With NAILA Grant      cefepime  2,000 mg Intravenous Q8H Nurys Cole MD 2,000 mg (08/08/20 1200)    clopidogrel  75 mg Oral Daily NAILA Wright      heparin (porcine)  5,000 Units Subcutaneous Q8H [address] Mona Del Cid MD      hydrOXYzine HCL  25 mg Oral Q6H PRN NAILA Stahl      insulin glargine  80 Units Subcutaneous HS NAILA Suarez      insulin lispro  1-5 Units Subcutaneous TID NAILA Mujica     [facility] insulin lispro  1-5 Units Subcutaneous HS Cuflorina Dykes, RICARDONP      insulin lispro  8 Units Subcutaneous TID With Meals NAILA Suarez      metroNIDAZOLE  500 mg Intravenous Q8H Cuikaycee Gillisk, RICARDONP 500 mg (08/08/20 0957)    ondansetron  4 mg Intravenous Q6H PRN Dariela Dykes, NAILA      pantoprazole  40 mg Oral Early Morning Cuikaycee Gillisk, NAILA      saccharomyces boulardii  250 mg Oral BID Cuikaycee Dykes, NAILA      sodium chloride  75 mL/hr Intravenous Continuous RICARDO SuarezNP 75 mL/hr (08/06/20 05)    sodium chloride  75 mL/hr Intravenous Continuous NAILA Vaca 75 mL/hr (08/08/20 6679)    verapamil  240 mg Oral Daily NAILA Suarez          Today, Patient Was Seen By: Marie Green MD    ** Please Note: Dictation voice to text software may have been used in the creation of this document   **

## 2020-08-08 NOTE — ASSESSMENT & PLAN NOTE
Lab Results   Component Value Date    HGBA1C 9 7 (H) 08/05/2020       Recent Labs     08/07/20  0739 08/07/20  1100 08/07/20  1628 08/07/20  2117   POCGLU 112 143* 157* 172*       Blood Sugar Average: Last 72 hrs:  (P) 959 [id number]   Poorly controlled type 2 diabetes  On Lantus 80 units HS and Apidra 40 units t i d  With meals at home  Will continue Lantus    Decrease mealtime insulin to 8 units t i d   SSI  Diabetic diet  Blood sugar acceptable

## 2020-08-08 NOTE — ASSESSMENT & PLAN NOTE
Creatinine 1 36  Baseline creatinine around 0 8  Improved with IV hydration    Creatinine 0 92 today  Started on IV hydration again because of angioplasty    Results from last 7 days   Lab Units 08/07/20  0624 08/06/20  1151 08/05/20  0506 08/04/20  1358   BUN mg/dL 14 12 23 33*   CREATININE mg/dL 0 75 0 92 0 92 1 36*

## 2020-08-08 NOTE — ASSESSMENT & PLAN NOTE
Gangrene of right great toe and hallux with cellulitis  Reports foul-smelling discharge from the toe for 1 week and progressively worsening discoloration to right great toe  Started on Augmentin on Thursday last week by podiatrist   X-ray right foot showed possible osteomyelitis  MRI foot Osteomyelitis of the distal portion of the first distal phalanx  Patient received IV Vanco, cefepime in ED  Evaluated by ID and antibiotic changed to IV cefepime and Flagyl now  Patient seen by podiatry, appreciate input  Patient will need amputation which is scheduled on Monday  Has popliteal artery occlusion, plan for angioplasty today by IR  Vascular surgery on board also

## 2020-08-09 LAB
BACTERIA BLD CULT: NORMAL
BACTERIA BLD CULT: NORMAL
GLUCOSE SERPL-MCNC: 141 MG/DL (ref 65–140)
GLUCOSE SERPL-MCNC: 202 MG/DL (ref 65–140)
GLUCOSE SERPL-MCNC: 220 MG/DL (ref 65–140)
GLUCOSE SERPL-MCNC: 78 MG/DL (ref 65–140)

## 2020-08-09 PROCEDURE — 82948 REAGENT STRIP/BLOOD GLUCOSE: CPT

## 2020-08-09 PROCEDURE — 99232 SBSQ HOSP IP/OBS MODERATE 35: CPT | Performed by: INTERNAL MEDICINE

## 2020-08-09 RX ORDER — INSULIN GLARGINE 100 [IU]/ML
70 INJECTION, SOLUTION SUBCUTANEOUS
Status: DISCONTINUED | OUTPATIENT
Start: 2020-08-09 | End: 2020-08-10

## 2020-08-09 RX ADMIN — METRONIDAZOLE 500 MG: 500 INJECTION, SOLUTION INTRAVENOUS at 01:30

## 2020-08-09 RX ADMIN — CLOPIDOGREL BISULFATE 75 MG: 75 TABLET ORAL at 08:04

## 2020-08-09 RX ADMIN — HEPARIN SODIUM 5000 UNITS: 5000 INJECTION INTRAVENOUS; SUBCUTANEOUS at 15:29

## 2020-08-09 RX ADMIN — METRONIDAZOLE 500 MG: 500 INJECTION, SOLUTION INTRAVENOUS at 10:26

## 2020-08-09 RX ADMIN — METRONIDAZOLE 500 MG: 500 INJECTION, SOLUTION INTRAVENOUS at 18:01

## 2020-08-09 RX ADMIN — Medication 250 MG: at 08:03

## 2020-08-09 RX ADMIN — HEPARIN SODIUM 5000 UNITS: 5000 INJECTION INTRAVENOUS; SUBCUTANEOUS at 07:34

## 2020-08-09 RX ADMIN — CEFEPIME HYDROCHLORIDE 2000 MG: 2 INJECTION, POWDER, FOR SOLUTION INTRAVENOUS at 12:00

## 2020-08-09 RX ADMIN — INSULIN GLARGINE 70 UNITS: 100 INJECTION, SOLUTION SUBCUTANEOUS at 21:09

## 2020-08-09 RX ADMIN — VERAPAMIL HYDROCHLORIDE 240 MG: 120 TABLET, FILM COATED, EXTENDED RELEASE ORAL at 08:03

## 2020-08-09 RX ADMIN — ATORVASTATIN CALCIUM 40 MG: 40 TABLET, FILM COATED ORAL at 16:28

## 2020-08-09 RX ADMIN — INSULIN LISPRO 2 UNITS: 100 INJECTION, SOLUTION INTRAVENOUS; SUBCUTANEOUS at 21:10

## 2020-08-09 RX ADMIN — CEFEPIME HYDROCHLORIDE 2000 MG: 2 INJECTION, POWDER, FOR SOLUTION INTRAVENOUS at 03:00

## 2020-08-09 RX ADMIN — INSULIN LISPRO 2 UNITS: 100 INJECTION, SOLUTION INTRAVENOUS; SUBCUTANEOUS at 16:29

## 2020-08-09 RX ADMIN — ASPIRIN 81 MG 81 MG: 81 TABLET ORAL at 08:04

## 2020-08-09 RX ADMIN — PANTOPRAZOLE SODIUM 40 MG: 40 TABLET, DELAYED RELEASE ORAL at 07:34

## 2020-08-09 RX ADMIN — Medication 250 MG: at 18:01

## 2020-08-09 RX ADMIN — CEFEPIME HYDROCHLORIDE 2000 MG: 2 INJECTION, POWDER, FOR SOLUTION INTRAVENOUS at 18:59

## 2020-08-09 RX ADMIN — HEPARIN SODIUM 5000 UNITS: 5000 INJECTION INTRAVENOUS; SUBCUTANEOUS at 21:10

## 2020-08-09 NOTE — ASSESSMENT & PLAN NOTE
Creatinine 1 36  Baseline creatinine around 0 8  Resolved now    Stop IV fluid    Results from last 7 days   Lab Units 08/08/20  0538 08/07/20  0624 08/06/20  1151 08/05/20  0506 08/04/20  1358   BUN mg/dL 11 14 12 23 33*   CREATININE mg/dL 0 76 0 75 0 92 0 92 1 36*

## 2020-08-09 NOTE — ASSESSMENT & PLAN NOTE
Lab Results   Component Value Date    HGBA1C 9 7 (H) 08/05/2020       Recent Labs     08/08/20  1613 08/08/20  2128 08/09/20  0722 08/09/20  1052   POCGLU 152* 195* 78 141*       Blood Sugar Average: Last 72 hrs:  (P) 508 [id number]   Poorly controlled type 2 diabetes at home  This morning his blood sugar was lower side    Will decrease Lantus to 70 units HS  Decrease mealtime insulin to 6 units t i d   SSI  Diabetic diet

## 2020-08-09 NOTE — PROGRESS NOTES
Progress Note - Isi Antoine 1965, 60 y o  male MRN: [id number]    Unit/Bed#: -01 Encounter: [id number]    Primary Care Provider: Claude Decree, MD   Date and time admitted to hospital: 8/4/2020  1:33 PM        PAD (peripheral artery disease) [facility])  Assessment & Plan  Right popliteal occlusion  s/p IR angioplasty  Now cleared for amputation    Hyponatremia  Assessment & Plan  Pseudohyponatremia due to hyperglycemia  Resolved    Acute kidney injury ([address] )  Assessment & Plan  Creatinine 1 36  Baseline creatinine around 0 8  Resolved now  Stop IV fluid    Results from last 7 days   Lab Units 08/08/20  0538 08/07/20  0624 08/06/20  1151 08/05/20  0506 08/04/20  1358   BUN mg/dL 11 14 12 23 33*   CREATININE mg/dL 0 76 0 75 0 92 0 92 1 36*       Severe sepsis (HCC)  Assessment & Plan  POA, as evidenced by leukocytosis, tachycardia, lactic acidosis, with source of infection right great toe gangrene with cellulitis  Lactic acid 3 1 in ED  Normalized after receiving 2000 mL NS in ED  IV antibiotic as above  Blood cultures x2 no growth so far    Multiple thyroid nodules  Assessment & Plan  Status post thyroid biopsy  Outpatient follow-up with PCP    Type 2 diabetes mellitus with hyperglycemia, with long-term current use of insulin [facility])  Assessment & Plan  Lab Results   Component Value Date    HGBA1C 9 7 (H) 08/05/2020       Recent Labs     08/08/20  1613 08/08/20  2128 08/09/20  0722 08/09/20  1052   POCGLU 152* 195* 78 141*       Blood Sugar Average: Last 72 hrs:  (P) 949 [id number]   Poorly controlled type 2 diabetes at home  This morning his blood sugar was lower side  Will decrease Lantus to 70 units HS  Decrease mealtime insulin to 6 units t i d   SSI  Diabetic diet      Obesity, Class I, BMI 30 0-34 9 (see actual BMI)  Assessment & Plan  Body mass index is 30 32 kg/m²    Diet and lifestyle modification    Dyslipidemia, goal LDL below 100  Assessment & Plan  Not on medication at home  LDL high, started on statin    Gastroesophageal reflux disease without esophagitis  Assessment & Plan  Continue PPI    Hypertension, essential  Assessment & Plan  BP acceptable  Hold Avalide  Continue verapamil  Monitor BP    * Gangrene of toe of right foot (HCC)  Assessment & Plan  Gangrene of right great toe and hallux with cellulitis  Reports foul-smelling discharge from the toe for 1 week and progressively worsening discoloration to right great toe  Started on Augmentin on Thursday last week by podiatrist   X-ray right foot showed possible osteomyelitis  MRI foot Osteomyelitis of the distal portion of the first distal phalanx  Patient received IV Vanco, cefepime in ED  Evaluated by ID and antibiotic changed to IV cefepime and Flagyl now  Patient seen by podiatry, appreciate input  Patient will need amputation which is scheduled on Tuesday  Has popliteal artery occlusion, plan for angioplasty today by IR  Vascular surgery on board also  VTE Pharmacologic Prophylaxis:   Pharmacologic: Heparin  Mechanical VTE Prophylaxis in Place: Yes    Patient Centered Rounds: I have performed bedside rounds with nursing staff today  Discussions with Specialists or Other Care Team Provider:  Podiatry    Education and Discussions with Family / Patient:     Time Spent for Care: More than 50% of total time spent on counseling and coordination of care as described above  Current Length of Stay: 5 day(s)    Current Patient Status: Inpatient   Certification Statement: The patient will continue to require additional inpatient hospital stay due to See above    Discharge Plan:  48-72 hours    Code Status: No Order      Subjective:   I have seen and examined the patient bedside this morning  His blood sugar was 78 this morning  Denies any chest pain, palpitation or shortness of breath  Plan for right great toe amputation on Tuesday      Objective:     Vitals:   Temp (24hrs), Av °F (37 2 °C), Min:98 9 °F (37 2 °C), Max:99 °F (37 2 °C)    Temp:  [98 9 °F (37 2 °C)-99 °F (37 2 °C)] 99 °F (37 2 °C)  HR:  [70-76] 76  Resp:  [16-19] 19  BP: (146-149)/(82-86) 146/82  SpO2:  [96 %] 96 %  Body mass index is 30 32 kg/m²  Input and Output Summary (last 24 hours): Intake/Output Summary (Last 24 hours) at 8/9/2020 1533  Last data filed at 8/9/2020 1255  Gross per 24 hour   Intake 940 ml   Output 1340 ml   Net -400 ml       Physical Exam:     Physical Exam  General- Awake, alert and oriented x 3, looks comfortable  HEENT- Normocephalic, atraumatic  CVS- Normal S1/ S2, Regular rate and rhythm  Respiratory system- B/L clear breath sounds, no wheezing  Abdomen- Soft, Non distended, no tenderness, Bowel sound- present  Musculoskeletal, right great toe gangrene  CNS- No acute focal neurologic deficit noted    Additional Data:     Labs:    Results from last 7 days   Lab Units 08/08/20  0538 08/07/20  0624   WBC Thousand/uL 10 63* 13 82*   HEMOGLOBIN g/dL 12 1 11 2*   HEMATOCRIT % 35 8* 33 1*   PLATELETS Thousands/uL 361 340   NEUTROS PCT %  --  72   LYMPHS PCT %  --  15   MONOS PCT %  --  10   EOS PCT %  --  1     Results from last 7 days   Lab Units 08/08/20  0538  08/04/20  1358   SODIUM mmol/L 138   < > 129*   POTASSIUM mmol/L 3 5   < > 4 7   CHLORIDE mmol/L 102   < > 93*   CO2 mmol/L 27   < > 23   BUN mg/dL 11   < > 33*   CREATININE mg/dL 0 76   < > 1 36*   ANION GAP mmol/L 9   < > 13   CALCIUM mg/dL 8 6   < > 9 3   ALBUMIN g/dL  --   --  3 1*   TOTAL BILIRUBIN mg/dL  --   --  1 20*   ALK PHOS U/L  --   --  95   ALT U/L  --   --  39   AST U/L  --   --  31   GLUCOSE RANDOM mg/dL 165*   < > 365*    < > = values in this interval not displayed       Results from last 7 days   Lab Units 08/04/20  1358   INR  1 04     Results from last 7 days   Lab Units 08/09/20  1052 08/09/20  5141 08/08/20  2128 08/08/20  1613 08/08/20  1115 08/08/20  0747 08/07/20  2117 08/07/20  1628 08/07/20  1100 08/07/20  0739 08/06/20  2022 08/06/20  1823 POC GLUCOSE mg/dl 141* 78 195* 152* 112 125 172* 157* 143* 112 132 121     Results from last 7 days   Lab Units 08/05/20  0506   HEMOGLOBIN A1C % 9 7*     Results from last 7 days   Lab Units 08/05/20  0506 08/04/20  1709 08/04/20  1358   LACTIC ACID mmol/L  --  1 9 3 1*   PROCALCITONIN ng/ml 0 13  --   --            * I Have Reviewed All Lab Data Listed Above  * Additional Pertinent Lab Tests Reviewed: All Labs Within Last 24 Hours Reviewed    Imaging:    Imaging Reports Reviewed Today Include:   Imaging Personally Reviewed by Myself Includes:      Recent Cultures (last 7 days):     Results from last 7 days   Lab Units 08/04/20  1837 08/04/20  1410 08/04/20  1358   BLOOD CULTURE   --  No Growth After 4 Days  No Growth After 4 Days     GRAM STAIN RESULT  2+ Gram positive cocci in pairs and chains*  1+ Gram negative rods*  No polys seen*  --   --    WOUND CULTURE  3+ Growth of Morganella morganii*  2+ Growth of Citrobacter freundii*  2+ Growth of Enterobacter cloacae complex*  2+ Growth of   --   --        Last 24 Hours Medication List:   Current Facility-Administered Medications   Medication Dose Route Frequency Provider Last Rate    acetaminophen  650 mg Oral Q6H PRN NAILA Suarez      aspirin  81 mg Oral Daily Skip Stokes PA-C      atorvastatin  40 mg Oral Daily With NAILA Grant      cefepime  2,000 mg Intravenous Q8H Estrella Rodriguez MD 2,000 mg (08/09/20 1200)    clopidogrel  75 mg Oral Daily NAILA Zamudio      heparin (porcine)  5,000 Units Subcutaneous Q8H [facility] Ifrah Mejía MD      hydrOXYzine HCL  25 mg Oral Q6H PRN NAILA Villar      insulin glargine  70 Units Subcutaneous HS Ifrah Mejía MD      insulin lispro  1-5 Units Subcutaneous TID AC NAILA Suarez      insulin lispro  1-5 Units Subcutaneous HS NAILA Suarez      insulin lispro  6 Units Subcutaneous TID With Meals Ifrah Mejía MD      metroNIDAZOLE  500 mg Intravenous Q8H RICARDO SuarezNP 500 mg (08/09/20 1026)    ondansetron  4 mg Intravenous Q6H PRN NAILA Suarez      pantoprazole  40 mg Oral Early Morning NAILA Suarez      saccharomyces boulardii  250 mg Oral BID NAILA Suarez      verapamil  240 mg Oral Daily NAILA Suarez          Today, Patient Was Seen By: Sudhir Black MD    ** Please Note: Dictation voice to text software may have been used in the creation of this document   **

## 2020-08-09 NOTE — ASSESSMENT & PLAN NOTE
Gangrene of right great toe and hallux with cellulitis  Reports foul-smelling discharge from the toe for 1 week and progressively worsening discoloration to right great toe  Started on Augmentin on Thursday last week by podiatrist   X-ray right foot showed possible osteomyelitis  MRI foot Osteomyelitis of the distal portion of the first distal phalanx  Patient received IV Vanco, cefepime in ED  Evaluated by ID and antibiotic changed to IV cefepime and Flagyl now  Patient seen by podiatry, appreciate input  Patient will need amputation which is scheduled on Tuesday  Has popliteal artery occlusion, plan for angioplasty today by IR  Vascular surgery on board also

## 2020-08-10 ENCOUNTER — ANESTHESIA EVENT (INPATIENT)
Dept: PERIOP | Facility: HOSPITAL | Age: 55
DRG: 854 | End: 2020-08-10
Payer: COMMERCIAL

## 2020-08-10 LAB
GLUCOSE SERPL-MCNC: 176 MG/DL (ref 65–140)
GLUCOSE SERPL-MCNC: 207 MG/DL (ref 65–140)
GLUCOSE SERPL-MCNC: 217 MG/DL (ref 65–140)
GLUCOSE SERPL-MCNC: 71 MG/DL (ref 65–140)

## 2020-08-10 PROCEDURE — 99232 SBSQ HOSP IP/OBS MODERATE 35: CPT | Performed by: INTERNAL MEDICINE

## 2020-08-10 PROCEDURE — 82948 REAGENT STRIP/BLOOD GLUCOSE: CPT

## 2020-08-10 RX ORDER — INSULIN GLARGINE 100 [IU]/ML
65 INJECTION, SOLUTION SUBCUTANEOUS
Status: DISCONTINUED | OUTPATIENT
Start: 2020-08-10 | End: 2020-08-12 | Stop reason: HOSPADM

## 2020-08-10 RX ORDER — DEXTROSE AND SODIUM CHLORIDE 5; .9 G/100ML; G/100ML
50 INJECTION, SOLUTION INTRAVENOUS CONTINUOUS
Status: DISPENSED | OUTPATIENT
Start: 2020-08-11 | End: 2020-08-11

## 2020-08-10 RX ADMIN — INSULIN LISPRO 2 UNITS: 100 INJECTION, SOLUTION INTRAVENOUS; SUBCUTANEOUS at 17:07

## 2020-08-10 RX ADMIN — METRONIDAZOLE 500 MG: 500 INJECTION, SOLUTION INTRAVENOUS at 21:59

## 2020-08-10 RX ADMIN — PANTOPRAZOLE SODIUM 40 MG: 40 TABLET, DELAYED RELEASE ORAL at 05:38

## 2020-08-10 RX ADMIN — CLOPIDOGREL BISULFATE 75 MG: 75 TABLET ORAL at 09:13

## 2020-08-10 RX ADMIN — ASPIRIN 81 MG 81 MG: 81 TABLET ORAL at 09:13

## 2020-08-10 RX ADMIN — CEFEPIME HYDROCHLORIDE 2000 MG: 2 INJECTION, POWDER, FOR SOLUTION INTRAVENOUS at 20:52

## 2020-08-10 RX ADMIN — CEFEPIME HYDROCHLORIDE 2000 MG: 2 INJECTION, POWDER, FOR SOLUTION INTRAVENOUS at 05:00

## 2020-08-10 RX ADMIN — Medication 250 MG: at 09:13

## 2020-08-10 RX ADMIN — METRONIDAZOLE 500 MG: 500 INJECTION, SOLUTION INTRAVENOUS at 14:50

## 2020-08-10 RX ADMIN — HEPARIN SODIUM 5000 UNITS: 5000 INJECTION INTRAVENOUS; SUBCUTANEOUS at 05:38

## 2020-08-10 RX ADMIN — Medication 250 MG: at 17:05

## 2020-08-10 RX ADMIN — INSULIN LISPRO 1 UNITS: 100 INJECTION, SOLUTION INTRAVENOUS; SUBCUTANEOUS at 12:24

## 2020-08-10 RX ADMIN — HEPARIN SODIUM 5000 UNITS: 5000 INJECTION INTRAVENOUS; SUBCUTANEOUS at 14:50

## 2020-08-10 RX ADMIN — CEFEPIME HYDROCHLORIDE 2000 MG: 2 INJECTION, POWDER, FOR SOLUTION INTRAVENOUS at 12:33

## 2020-08-10 RX ADMIN — METRONIDAZOLE 500 MG: 500 INJECTION, SOLUTION INTRAVENOUS at 05:38

## 2020-08-10 RX ADMIN — ATORVASTATIN CALCIUM 40 MG: 40 TABLET, FILM COATED ORAL at 17:05

## 2020-08-10 RX ADMIN — VERAPAMIL HYDROCHLORIDE 240 MG: 120 TABLET, FILM COATED, EXTENDED RELEASE ORAL at 09:13

## 2020-08-10 RX ADMIN — HEPARIN SODIUM 5000 UNITS: 5000 INJECTION INTRAVENOUS; SUBCUTANEOUS at 21:58

## 2020-08-10 NOTE — PROGRESS NOTES
Progress Note - Infectious Disease   Anselmo Roland 60 y o  male MRN: [id number]  Unit/Bed#: -01 Encounter: [id number]      Impression/Plan:  1  Sepsis, POA   Patient presented on admission with leukocytosis and tachycardia   Source of infection is problem 2  No other obvious sources on exam  Rj Singh is fortunately hemodynamically stable  Continue antibiotics as below  Continue to trend fever curve/vitals  Repeat CBC/chemistry tomorrow  Additional supportive care as per primary      2  Gangrene of right foot great toe with osteomyelitis   Patient has had clinical progression of a previous injury approximately 8 weeks ago where he reports that the bone was exposed at that time   MRI confirms osteomyelitis   Exam consistent with severe necrosis/tissue death and suspected superinfection with foul-smelling drainage  Cultures and susceptibilities reviewed  Planned for amputation on this admission  Continue cefepime  Continue Flagyl  Continue to trend fever curve/vitals  Repeat CBC/chemistry tomorrow  Ongoing follow-up by vascular  Ongoing follow-up by Podiatry  Additional supportive care as per primary  Duration of therapy pending clinical course/amputation     3  Acute kidney injury   Acute elevation in creatinine noted from baseline   Suspect that this was due to acute illness   Has since down trended   Impacts antibiotic dosing  Maintain cefepime at high-dose  Continue to monitor chemistry closely  Will further dose adjust antibiotic as needed     4  Poorly controlled diabetes with neuropathy and vascular disease   Patient noted with hemoglobin A1c of 10  He also has underlying neuropathy as well as peripheral vascular disease   Unfortunately these contribute to poor wound healing and infection as above  Status post angiogram on 08/06  Ongoing glucose management as per primary  Ongoing follow-up by vascular surgery  Antibiotics as above      5  Obesity   Patient noted with BMI of 30   Can impact antibiotic efficacy/dosing  Higher doses of cefepime as above  Recommend discussion of weight loss as outpatient      6  Thyroid cancer   Patient recently diagnosed with thyroid cancer in July   Currently not on any chemotherapy   Recommend follow-up with oncology/endocrinology as outpatient  Above plan discussed in detail with the patient at bedside  ID consult service will continue to follow  Antibiotics:  Cefepime 4  Flagyl 6  Total antibiotic 7    24 hour events:  No Acute events noted overnight on chart review  Patient reports amputation tomorrow  Currently afebrile  Cultures reviewed  No new imaging  Patient's other vitals are stable  Recent labs unremarkable    Subjective:  Patient currently denies having any nausea, vomiting, chest pain or shortness of breath  He is tolerating antibiotic without development of diarrhea or rash  Objective:  Vitals:  Temp:  [98 4 °F (36 9 °C)-99 1 °F (37 3 °C)] 98 4 °F (36 9 °C)  HR:  [76] 76  Resp:  [18] 18  BP: (137-139)/(80-81) 139/80  SpO2:  [96 %] 96 %  Temp (24hrs), Av 8 °F (37 1 °C), Min:98 4 °F (36 9 °C), Max:99 1 °F (37 3 °C)  Current: Temperature: 98 4 °F (36 9 °C)    Physical Exam:   General Appearance:  Alert, interactive, nontoxic, no acute distress  Throat: Oropharynx moist without lesions  Lungs:   Clear to auscultation bilaterally; no wheezes, rhonchi or rales; respirations unlabored on room air   Heart:  RRR; no murmur, rub or gallop appreciated   Abdomen:   Soft, non-tender, non-distended, positive bowel sounds  Extremities: No clubbing, cyanosis or edema   Skin: No new rashes or lesions  No new draining wounds noted  Wound currently dressed at this time  Foul smell present from the dressing         Labs, Imaging, & Other studies:   All pertinent labs and imaging studies were personally reviewed  Results from last 7 days   Lab Units 20  0538 20  0624 20  1155   WBC Thousand/uL 10 63* 13 82* 14 02*   HEMOGLOBIN g/dL 12 1 11 2* 11 7*   PLATELETS Thousands/uL 361 340 311     Results from last 7 days   Lab Units 08/08/20  0538  08/04/20  1358   POTASSIUM mmol/L 3 5   < > 4 7   CHLORIDE mmol/L 102   < > 93*   CO2 mmol/L 27   < > 23   BUN mg/dL 11   < > 33*   CREATININE mg/dL 0 76   < > 1 36*   EGFR ml/min/1 73sq m 101   < > 57   CALCIUM mg/dL 8 6   < > 9 3   AST U/L  --   --  31   ALT U/L  --   --  39   ALK PHOS U/L  --   --  95    < > = values in this interval not displayed  Results from last 7 days   Lab Units 08/04/20  1837 08/04/20  1410 08/04/20  1358   BLOOD CULTURE   --  No Growth After 5 Days  No Growth After 5 Days     GRAM STAIN RESULT  2+ Gram positive cocci in pairs and chains*  1+ Gram negative rods*  No polys seen*  --   --    WOUND CULTURE  3+ Growth of Morganella morganii*  2+ Growth of Citrobacter freundii*  2+ Growth of Enterobacter cloacae complex*  2+ Growth of   --   --

## 2020-08-10 NOTE — PROGRESS NOTES
Patient to OR tomorrow at noon for amputation of the right great toe      NPO at midnight  Consent to be signed bedside

## 2020-08-10 NOTE — ASSESSMENT & PLAN NOTE
Resolved    Results from last 7 days   Lab Units 08/08/20  0538 08/07/20  0624 08/06/20  1151 08/05/20  0506 08/04/20  1358   BUN mg/dL 11 14 12 23 33*   CREATININE mg/dL 0 76 0 75 0 92 0 92 1 36*

## 2020-08-10 NOTE — PROGRESS NOTES
Progress Note - Garland Moffett 1965, 60 y o  male MRN: [id number]    Unit/Bed#: -01 Encounter: [id number]    Primary Care Provider: Ladan Garcia MD   Date and time admitted to hospital: 8/4/2020  1:33 PM        PAD (peripheral artery disease) [facility])  Assessment & Plan  Right popliteal occlusion  s/p IR angioplasty  Now cleared for amputation    Hyponatremia  Assessment & Plan  Pseudohyponatremia due to hyperglycemia  Resolved    Acute kidney injury [facility])  Assessment & Plan  Resolved    Results from last 7 days   Lab Units 08/08/20  0538 08/07/20  0624 08/06/20  1151 08/05/20  0506 08/04/20  1358   BUN mg/dL 11 14 12 23 33*   CREATININE mg/dL 0 76 0 75 0 92 0 92 1 36*       Severe sepsis (HCC)  Assessment & Plan  POA, as evidenced by leukocytosis, tachycardia, lactic acidosis, with source of infection right great toe gangrene with cellulitis  Lactic acid 3 1 in ED  Normalized after receiving 2000 mL NS in ED  IV antibiotic as above  Blood cultures x2 no growth    Multiple thyroid nodules  Assessment & Plan  Status post thyroid biopsy  Outpatient follow-up with PCP    Type 2 diabetes mellitus with hyperglycemia, with long-term current use of insulin [facility])  Assessment & Plan  Lab Results   Component Value Date    HGBA1C 9 7 (H) 08/05/2020       Recent Labs     08/09/20  1052 08/09/20  1626 08/09/20  2100 08/10/20  0739   POCGLU 141* 220* 202* 71       Blood Sugar Average: Last 72 hrs:  (P) 473 [id number]   Poorly controlled type 2 diabetes at home  This morning his blood sugar was 71  Will decrease Lantus to 65 units HS  Decrease mealtime insulin to 6 units t i d   SSI  Diabetic diet      Obesity, Class I, BMI 30 0-34 9 (see actual BMI)  Assessment & Plan  Body mass index is 30 32 kg/m²    Diet and lifestyle modification    Dyslipidemia, goal LDL below 100  Assessment & Plan  Not on medication at home  LDL high, started on statin    Gastroesophageal reflux disease without esophagitis  Assessment & Plan  Continue PPI    Hypertension, essential  Assessment & Plan  BP acceptable  Hold Avalide  Continue verapamil  Monitor BP  Stable    * Gangrene of toe of right foot ([address] )  Assessment & Plan  Gangrene of right great toe and hallux with cellulitis  Reports foul-smelling discharge from the toe for 1 week and progressively worsening discoloration to right great toe  Started on Augmentin on Thursday last week by podiatrist   X-ray right foot showed possible osteomyelitis  MRI foot Osteomyelitis of the distal portion of the first distal phalanx  Patient received IV Vanco, cefepime in ED  Evaluated by ID and antibiotic changed to IV cefepime and Flagyl now  Patient seen by podiatry, appreciate input  Patient will need amputation which is scheduled tomorrow  Has popliteal artery occlusion, status post angioplasty by IR  Vascular surgery on board also  Cleared for surgery        VTE Pharmacologic Prophylaxis:   Pharmacologic: Heparin  Mechanical VTE Prophylaxis in Place: Yes    Patient Centered Rounds: I have performed bedside rounds with nursing staff today  Discussions with Specialists or Other Care Team Provider:  Podiatry    Education and Discussions with Family / Patient:  Patient and wife bedside    Time Spent for Care: More than 50% of total time spent on counseling and coordination of care as described above  Current Length of Stay: 6 day(s)    Current Patient Status: Inpatient   Certification Statement: The patient will continue to require additional inpatient hospital stay due to See above    Discharge Plan:  48-72 hours    Code Status: No Order      Subjective:   I have seen and examined the patient bedside this morning  Patient denies any new complaints  Waiting for right great toe amputation tomorrow  Blood sugar 71 this morning  Afebrile      Objective:     Vitals:   Temp (24hrs), Av 8 °F (37 1 °C), Min:98 4 °F (36 9 °C), Max:99 1 °F (37 3 °C)    Temp: [98 4 °F (36 9 °C)-99 1 °F (37 3 °C)] 98 4 °F (36 9 °C)  HR:  [76] 76  Resp:  [18] 18  BP: (137-139)/(80-81) 139/80  SpO2:  [96 %] 96 %  Body mass index is 30 32 kg/m²  Input and Output Summary (last 24 hours): Intake/Output Summary (Last 24 hours) at 8/10/2020 1656  Last data filed at 8/9/2020 1900  Gross per 24 hour   Intake 720 ml   Output    Net 720 ml       Physical Exam:     Physical Exam  General- Awake, alert and oriented x 3, looks comfortable  HEENT- Normocephalic, atraumatic  CVS- Normal S1/ S2, Regular rate and rhythm  Respiratory system- B/L clear breath sounds, no wheezing  Abdomen- Soft, Non distended, no tenderness, Bowel sound- present  Musculoskeletal- right great to gangrene  CNS- No acute focal neurologic deficit noted    Additional Data:     Labs:    Results from last 7 days   Lab Units 08/08/20  0538 08/07/20  0624   WBC Thousand/uL 10 63* 13 82*   HEMOGLOBIN g/dL 12 1 11 2*   HEMATOCRIT % 35 8* 33 1*   PLATELETS Thousands/uL 361 340   NEUTROS PCT %  --  72   LYMPHS PCT %  --  15   MONOS PCT %  --  10   EOS PCT %  --  1     Results from last 7 days   Lab Units 08/08/20  0538  08/04/20  1358   SODIUM mmol/L 138   < > 129*   POTASSIUM mmol/L 3 5   < > 4 7   CHLORIDE mmol/L 102   < > 93*   CO2 mmol/L 27   < > 23   BUN mg/dL 11   < > 33*   CREATININE mg/dL 0 76   < > 1 36*   ANION GAP mmol/L 9   < > 13   CALCIUM mg/dL 8 6   < > 9 3   ALBUMIN g/dL  --   --  3 1*   TOTAL BILIRUBIN mg/dL  --   --  1 20*   ALK PHOS U/L  --   --  95   ALT U/L  --   --  39   AST U/L  --   --  31   GLUCOSE RANDOM mg/dL 165*   < > 365*    < > = values in this interval not displayed       Results from last 7 days   Lab Units 08/04/20  1358   INR  1 04     Results from last 7 days   Lab Units 08/10/20  0739 08/09/20  2100 08/09/20  1626 08/09/20  1052 08/09/20  0722 08/08/20  2128 08/08/20  1613 08/08/20  1115 08/08/20  0747 08/07/20  2117 08/07/20  1628 08/07/20  1100   POC GLUCOSE mg/dl 71 202* 220* 141* 78 195* 152* 112 125 172* 157* 143*     Results from last 7 days   Lab Units 08/05/20  0506   HEMOGLOBIN A1C % 9 7*     Results from last 7 days   Lab Units 08/05/20  0506 08/04/20  1709 08/04/20  1358   LACTIC ACID mmol/L  --  1 9 3 1*   PROCALCITONIN ng/ml 0 13  --   --            * I Have Reviewed All Lab Data Listed Above  * Additional Pertinent Lab Tests Reviewed: All Labs Within Last 24 Hours Reviewed    Imaging:    Imaging Reports Reviewed Today Include:   Imaging Personally Reviewed by Myself Includes:      Recent Cultures (last 7 days):     Results from last 7 days   Lab Units 08/04/20  1837 08/04/20  1410 08/04/20  1358   BLOOD CULTURE   --  No Growth After 5 Days  No Growth After 5 Days     GRAM STAIN RESULT  2+ Gram positive cocci in pairs and chains*  1+ Gram negative rods*  No polys seen*  --   --    WOUND CULTURE  3+ Growth of Morganella morganii*  2+ Growth of Citrobacter freundii*  2+ Growth of Enterobacter cloacae complex*  2+ Growth of   --   --        Last 24 Hours Medication List:   Current Facility-Administered Medications   Medication Dose Route Frequency Provider Last Rate    acetaminophen  650 mg Oral Q6H PRN NAILA Suarez      aspirin  81 mg Oral Daily Skip Stokes PA-C      atorvastatin  40 mg Oral Daily With NAILA Grant      cefepime  2,000 mg Intravenous Q8H Vanna Hines MD 2,000 mg (08/10/20 0500)    clopidogrel  75 mg Oral Daily NAILA Solis      heparin (porcine)  5,000 Units Subcutaneous Q8H [facility] Sole Tello MD      hydrOXYzine HCL  25 mg Oral Q6H PRN NAILA Green      insulin glargine  65 Units Subcutaneous HS Sole Tello MD      insulin lispro  1-5 Units Subcutaneous TID AC NAILA Suarez      insulin lispro  1-5 Units Subcutaneous HS NAILA Suarez      insulin lispro  6 Units Subcutaneous TID With Meals Sole Tello MD      metroNIDAZOLE  500 mg Intravenous Q8H NAILA Suarez 500 mg (08/10/20 4042)    ondansetron  4 mg Intravenous Q6H PRN NAILA Suarez      pantoprazole  40 mg Oral Early Morning NAILA Suarez      saccharomyces boulardii  250 mg Oral BID NAILA Suarez      verapamil  240 mg Oral Daily NAILA Suarez          Today, Patient Was Seen By: Gabriela Castaneda MD    ** Please Note: Dictation voice to text software may have been used in the creation of this document   **

## 2020-08-10 NOTE — ASSESSMENT & PLAN NOTE
Lab Results   Component Value Date    HGBA1C 9 7 (H) 08/05/2020       Recent Labs     08/09/20  1052 08/09/20  1626 08/09/20  2100 08/10/20  0739   POCGLU 141* 220* 202* 71       Blood Sugar Average: Last 72 hrs:  (P) 809 [id number]   Poorly controlled type 2 diabetes at home  This morning his blood sugar was 71  Will decrease Lantus to 65 units HS    Decrease mealtime insulin to 6 units t i d   SSI  Diabetic diet

## 2020-08-10 NOTE — ASSESSMENT & PLAN NOTE
POA, as evidenced by leukocytosis, tachycardia, lactic acidosis, with source of infection right great toe gangrene with cellulitis  Lactic acid 3 1 in ED  Normalized after receiving 2000 mL NS in ED    IV antibiotic as above  Blood cultures x2 no growth

## 2020-08-10 NOTE — SOCIAL WORK
CM name and role reviewed  Discharge Checklist reviewed and CM will continue to monitor for progress toward discharge goals in nursing and provider rounds  CM met with pt at bedside  Pt alert  Pt's SO present  Pt reported that he lives in an one story home with 3 steps to enter  Pt lives with his SO  He said that he has been independent prior to admission  He said that he somewhat limited due to pain  He said that he has a cane at home  He said that he may need a new cane  He said that he may need additional dme if recommended by his podiatrist,   [facility]  Pt reported no hx of SNF  He reported that he has hx of [facility]NA but unable to remember the name of the agency  He said that he had a procedure on his foot before and needed a wound vac  He stated that this is when [facility]NA came to the home  He is agreeable to VNA if recommended again  Pt uses [facility] in [place] for [employer]  He uses [facility] for [facility]  He reported no issues with copayments  His PCP is Dr Judi Bell  He reported no MH background  He denied D&A  He also reported that he does not smoke cigarettes  He said that he is in the process of working on Advance Directive  Pt drives  His SO will transport him home when medically cleared  CM reviewed discharge planning process including the following: identifying help at home, patient preference for discharge planning needs, pharmacy preference, and availability of treatment team to discuss questions or concerns patient and/or family may have regarding understanding medications and recognizing signs and symptoms once discharged  CM also encouraged patient to follow up with all recommended appointments after discharge  Patient advised of importance for patient and family to participate in managing patients medical well being

## 2020-08-10 NOTE — ASSESSMENT & PLAN NOTE
Gangrene of right great toe and hallux with cellulitis  Reports foul-smelling discharge from the toe for 1 week and progressively worsening discoloration to right great toe  Started on Augmentin on Thursday last week by podiatrist   X-ray right foot showed possible osteomyelitis  MRI foot Osteomyelitis of the distal portion of the first distal phalanx  Patient received IV Vanco, cefepime in ED  Evaluated by ID and antibiotic changed to IV cefepime and Flagyl now  Patient seen by podiatry, appreciate input  Patient will need amputation which is scheduled tomorrow  Has popliteal artery occlusion, status post angioplasty by IR  Vascular surgery on board also    Cleared for surgery

## 2020-08-11 ENCOUNTER — APPOINTMENT (INPATIENT)
Dept: RADIOLOGY | Facility: HOSPITAL | Age: 55
DRG: 854 | End: 2020-08-11
Payer: COMMERCIAL

## 2020-08-11 ENCOUNTER — ANESTHESIA (INPATIENT)
Dept: PERIOP | Facility: HOSPITAL | Age: 55
DRG: 854 | End: 2020-08-11
Payer: COMMERCIAL

## 2020-08-11 LAB
ANION GAP SERPL CALCULATED.3IONS-SCNC: 9 MMOL/L (ref 4–13)
BASOPHILS # BLD AUTO: 0.06 THOUSANDS/ΜL (ref 0–0.1)
BASOPHILS NFR BLD AUTO: 1 % (ref 0–1)
BUN SERPL-MCNC: 11 MG/DL (ref 5–25)
CALCIUM SERPL-MCNC: 8.6 MG/DL (ref 8.3–10.1)
CHLORIDE SERPL-SCNC: 100 MMOL/L (ref 100–108)
CO2 SERPL-SCNC: 27 MMOL/L (ref 21–32)
CREAT SERPL-MCNC: 0.79 MG/DL (ref 0.6–1.3)
EOSINOPHIL # BLD AUTO: 0.28 THOUSAND/ΜL (ref 0–0.61)
EOSINOPHIL NFR BLD AUTO: 3 % (ref 0–6)
ERYTHROCYTE [DISTWIDTH] IN BLOOD BY AUTOMATED COUNT: 11.8 % (ref 11.6–15.1)
GFR SERPL CREATININE-BSD FRML MDRD: 100 ML/MIN/1.73SQ M
GLUCOSE SERPL-MCNC: 172 MG/DL (ref 65–140)
GLUCOSE SERPL-MCNC: 172 MG/DL (ref 65–140)
GLUCOSE SERPL-MCNC: 176 MG/DL (ref 65–140)
GLUCOSE SERPL-MCNC: 184 MG/DL (ref 65–140)
GLUCOSE SERPL-MCNC: 192 MG/DL (ref 65–140)
HCT VFR BLD AUTO: 35.2 % (ref 36.5–49.3)
HGB BLD-MCNC: 12.1 G/DL (ref 12–17)
IMM GRANULOCYTES # BLD AUTO: 0.13 THOUSAND/UL (ref 0–0.2)
IMM GRANULOCYTES NFR BLD AUTO: 1 % (ref 0–2)
LYMPHOCYTES # BLD AUTO: 1.97 THOUSANDS/ΜL (ref 0.6–4.47)
LYMPHOCYTES NFR BLD AUTO: 19 % (ref 14–44)
MCH RBC QN AUTO: 31.2 PG (ref 26.8–34.3)
MCHC RBC AUTO-ENTMCNC: 34.4 G/DL (ref 31.4–37.4)
MCV RBC AUTO: 91 FL (ref 82–98)
MONOCYTES # BLD AUTO: 1.06 THOUSAND/ΜL (ref 0.17–1.22)
MONOCYTES NFR BLD AUTO: 10 % (ref 4–12)
NEUTROPHILS # BLD AUTO: 6.72 THOUSANDS/ΜL (ref 1.85–7.62)
NEUTS SEG NFR BLD AUTO: 66 % (ref 43–75)
NRBC BLD AUTO-RTO: 0 /100 WBCS
PLATELET # BLD AUTO: 416 THOUSANDS/UL (ref 149–390)
PMV BLD AUTO: 10.6 FL (ref 8.9–12.7)
POTASSIUM SERPL-SCNC: 3.3 MMOL/L (ref 3.5–5.3)
RBC # BLD AUTO: 3.88 MILLION/UL (ref 3.88–5.62)
SODIUM SERPL-SCNC: 136 MMOL/L (ref 136–145)
WBC # BLD AUTO: 10.22 THOUSAND/UL (ref 4.31–10.16)

## 2020-08-11 PROCEDURE — 73630 X-RAY EXAM OF FOOT: CPT

## 2020-08-11 PROCEDURE — 82948 REAGENT STRIP/BLOOD GLUCOSE: CPT

## 2020-08-11 PROCEDURE — 88311 DECALCIFY TISSUE: CPT | Performed by: PATHOLOGY

## 2020-08-11 PROCEDURE — 99232 SBSQ HOSP IP/OBS MODERATE 35: CPT | Performed by: INTERNAL MEDICINE

## 2020-08-11 PROCEDURE — 88305 TISSUE EXAM BY PATHOLOGIST: CPT | Performed by: PATHOLOGY

## 2020-08-11 PROCEDURE — 99232 SBSQ HOSP IP/OBS MODERATE 35: CPT | Performed by: STUDENT IN AN ORGANIZED HEALTH CARE EDUCATION/TRAINING PROGRAM

## 2020-08-11 PROCEDURE — 0Y6P0Z0 DETACHMENT AT RIGHT 1ST TOE, COMPLETE, OPEN APPROACH: ICD-10-PCS | Performed by: PODIATRIST

## 2020-08-11 PROCEDURE — 80048 BASIC METABOLIC PNL TOTAL CA: CPT | Performed by: INTERNAL MEDICINE

## 2020-08-11 PROCEDURE — 85025 COMPLETE CBC W/AUTO DIFF WBC: CPT | Performed by: INTERNAL MEDICINE

## 2020-08-11 RX ORDER — FENTANYL CITRATE/PF 50 MCG/ML
25 SYRINGE (ML) INJECTION
Status: DISCONTINUED | OUTPATIENT
Start: 2020-08-11 | End: 2020-08-11 | Stop reason: HOSPADM

## 2020-08-11 RX ORDER — PROPOFOL 10 MG/ML
INJECTION, EMULSION INTRAVENOUS AS NEEDED
Status: DISCONTINUED | OUTPATIENT
Start: 2020-08-11 | End: 2020-08-11

## 2020-08-11 RX ORDER — SODIUM CHLORIDE, SODIUM LACTATE, POTASSIUM CHLORIDE, CALCIUM CHLORIDE 600; 310; 30; 20 MG/100ML; MG/100ML; MG/100ML; MG/100ML
125 INJECTION, SOLUTION INTRAVENOUS CONTINUOUS
Status: DISCONTINUED | OUTPATIENT
Start: 2020-08-11 | End: 2020-08-12 | Stop reason: HOSPADM

## 2020-08-11 RX ORDER — FENTANYL CITRATE 50 UG/ML
INJECTION, SOLUTION INTRAMUSCULAR; INTRAVENOUS AS NEEDED
Status: DISCONTINUED | OUTPATIENT
Start: 2020-08-11 | End: 2020-08-11

## 2020-08-11 RX ORDER — LIDOCAINE HYDROCHLORIDE 10 MG/ML
INJECTION, SOLUTION EPIDURAL; INFILTRATION; INTRACAUDAL; PERINEURAL AS NEEDED
Status: DISCONTINUED | OUTPATIENT
Start: 2020-08-11 | End: 2020-08-11

## 2020-08-11 RX ORDER — ONDANSETRON 2 MG/ML
4 INJECTION INTRAMUSCULAR; INTRAVENOUS ONCE AS NEEDED
Status: DISCONTINUED | OUTPATIENT
Start: 2020-08-11 | End: 2020-08-11 | Stop reason: HOSPADM

## 2020-08-11 RX ORDER — PROPOFOL 10 MG/ML
INJECTION, EMULSION INTRAVENOUS CONTINUOUS PRN
Status: DISCONTINUED | OUTPATIENT
Start: 2020-08-11 | End: 2020-08-11

## 2020-08-11 RX ORDER — MEPERIDINE HYDROCHLORIDE 50 MG/ML
12.5 INJECTION INTRAMUSCULAR; INTRAVENOUS; SUBCUTANEOUS ONCE AS NEEDED
Status: DISCONTINUED | OUTPATIENT
Start: 2020-08-11 | End: 2020-08-11 | Stop reason: HOSPADM

## 2020-08-11 RX ORDER — MIDAZOLAM HYDROCHLORIDE 2 MG/2ML
INJECTION, SOLUTION INTRAMUSCULAR; INTRAVENOUS AS NEEDED
Status: DISCONTINUED | OUTPATIENT
Start: 2020-08-11 | End: 2020-08-11

## 2020-08-11 RX ORDER — MAGNESIUM HYDROXIDE 1200 MG/15ML
LIQUID ORAL AS NEEDED
Status: DISCONTINUED | OUTPATIENT
Start: 2020-08-11 | End: 2020-08-11 | Stop reason: HOSPADM

## 2020-08-11 RX ORDER — SULFAMETHOXAZOLE AND TRIMETHOPRIM 800; 160 MG/1; MG/1
1 TABLET ORAL EVERY 12 HOURS SCHEDULED
Status: DISCONTINUED | OUTPATIENT
Start: 2020-08-11 | End: 2020-08-12 | Stop reason: HOSPADM

## 2020-08-11 RX ORDER — POTASSIUM CHLORIDE 20 MEQ/1
40 TABLET, EXTENDED RELEASE ORAL ONCE
Status: COMPLETED | OUTPATIENT
Start: 2020-08-11 | End: 2020-08-11

## 2020-08-11 RX ORDER — METRONIDAZOLE 500 MG/1
500 TABLET ORAL EVERY 8 HOURS SCHEDULED
Status: DISCONTINUED | OUTPATIENT
Start: 2020-08-12 | End: 2020-08-12 | Stop reason: HOSPADM

## 2020-08-11 RX ADMIN — FENTANYL CITRATE 25 MCG: 50 INJECTION INTRAMUSCULAR; INTRAVENOUS at 08:36

## 2020-08-11 RX ADMIN — METRONIDAZOLE 500 MG: 500 INJECTION, SOLUTION INTRAVENOUS at 12:11

## 2020-08-11 RX ADMIN — MIDAZOLAM HYDROCHLORIDE 2 MG: 1 INJECTION, SOLUTION INTRAMUSCULAR; INTRAVENOUS at 07:28

## 2020-08-11 RX ADMIN — LIDOCAINE HYDROCHLORIDE 50 MG: 10 INJECTION, SOLUTION EPIDURAL; INFILTRATION; INTRACAUDAL; PERINEURAL at 07:34

## 2020-08-11 RX ADMIN — INSULIN LISPRO 1 UNITS: 100 INJECTION, SOLUTION INTRAVENOUS; SUBCUTANEOUS at 12:10

## 2020-08-11 RX ADMIN — Medication 250 MG: at 09:18

## 2020-08-11 RX ADMIN — VERAPAMIL HYDROCHLORIDE 240 MG: 120 TABLET, FILM COATED, EXTENDED RELEASE ORAL at 09:18

## 2020-08-11 RX ADMIN — ASPIRIN 81 MG 81 MG: 81 TABLET ORAL at 09:18

## 2020-08-11 RX ADMIN — PROPOFOL 100 MCG/KG/MIN: 10 INJECTION, EMULSION INTRAVENOUS at 07:34

## 2020-08-11 RX ADMIN — SULFAMETHOXAZOLE AND TRIMETHOPRIM 1 TABLET: 800; 160 TABLET ORAL at 10:51

## 2020-08-11 RX ADMIN — FENTANYL CITRATE 25 MCG: 50 INJECTION, SOLUTION INTRAMUSCULAR; INTRAVENOUS at 07:34

## 2020-08-11 RX ADMIN — PROPOFOL 100 MG: 10 INJECTION, EMULSION INTRAVENOUS at 07:34

## 2020-08-11 RX ADMIN — SODIUM CHLORIDE, SODIUM LACTATE, POTASSIUM CHLORIDE, AND CALCIUM CHLORIDE: .6; .31; .03; .02 INJECTION, SOLUTION INTRAVENOUS at 07:14

## 2020-08-11 RX ADMIN — METRONIDAZOLE 500 MG: 500 INJECTION, SOLUTION INTRAVENOUS at 06:22

## 2020-08-11 RX ADMIN — DEXTROSE AND SODIUM CHLORIDE 50 ML/HR: 5; .9 INJECTION, SOLUTION INTRAVENOUS at 05:44

## 2020-08-11 RX ADMIN — POTASSIUM CHLORIDE 40 MEQ: 1500 TABLET, EXTENDED RELEASE ORAL at 10:51

## 2020-08-11 RX ADMIN — CEFEPIME HYDROCHLORIDE 2000 MG: 2 INJECTION, POWDER, FOR SOLUTION INTRAVENOUS at 05:46

## 2020-08-11 RX ADMIN — SODIUM CHLORIDE, SODIUM LACTATE, POTASSIUM CHLORIDE, AND CALCIUM CHLORIDE 125 ML/HR: .6; .31; .03; .02 INJECTION, SOLUTION INTRAVENOUS at 10:44

## 2020-08-11 RX ADMIN — CLOPIDOGREL BISULFATE 75 MG: 75 TABLET ORAL at 09:18

## 2020-08-11 NOTE — ANESTHESIA PREPROCEDURE EVALUATION
Procedure:  AMPUTATION TOE (Right Toe)    Relevant Problems   CARDIO   (+) Dyslipidemia, goal LDL below 100   (+) Hypertension, essential   (+) PAD (peripheral artery disease) (HCC)      ENDO   (+) Hyperthyroidism   (+) Type 2 diabetes mellitus with diabetic polyneuropathy, with long-term current use of insulin (HCC)   (+) Type 2 diabetes mellitus with hyperglycemia, with long-term current use of insulin (HCC)      GI/HEPATIC   (+) Gastroesophageal reflux disease without esophagitis      /RENAL   (+) Acute kidney injury ([facility] [address] )      NEURO/PSYCH   (+) History of nonadherence to medical treatment      Other   (+) Diabetes mellitus (HCC)   (+) Gangrene of toe of right foot (HCC)   (+) Hyponatremia   (+) Obesity, Class I, BMI 30 0-34 9 (see actual BMI)        Physical Exam    Airway    Mallampati score: III  TM Distance: >3 FB  Neck ROM: full     Dental   Comment: Very poor dentition with multiple teeth missing; #8, 9, 23 loose; patient aware of increased risk of dental injury,     Cardiovascular  Rhythm: regular, Rate: normal,     Pulmonary      Other Findings        Anesthesia Plan  ASA Score- 3     Anesthesia Type- IV sedation with anesthesia with ASA Monitors  Additional Monitors:   Airway Plan:           Plan Factors-    Induction- intravenous  Postoperative Plan-     Informed Consent- Anesthetic plan and risks discussed with patient  I personally reviewed this patient with the CRNA  Discussed and agreed on the Anesthesia Plan with the CRNA  Tavo Frazier

## 2020-08-11 NOTE — ASSESSMENT & PLAN NOTE
Resolved    Results from last 7 days   Lab Units 08/11/20  0543 08/08/20  0538 08/07/20  0624 08/06/20  1151 08/05/20  0506   BUN mg/dL 11 11 14 12 23   CREATININE mg/dL 0 79 0 76 0 75 0 92 0 92

## 2020-08-11 NOTE — ASSESSMENT & PLAN NOTE
Status post amputation today  Infectious disease following  Will determine duration of antibiotics post procedure  Continue with current regimen for now

## 2020-08-11 NOTE — ANESTHESIA POSTPROCEDURE EVALUATION
Post-Op Assessment Note    CV Status:  Stable  Pain Score: 0    Pain management: adequate     Mental Status:  Alert and awake   Hydration Status:  Euvolemic   PONV Controlled:  Controlled   Airway Patency:  Patent      Post Op Vitals Reviewed: Yes      Staff: CRNA         No complications documented      /81 (08/11/20 0816)    Temp (!) 97 3 °F (36 3 °C) (08/11/20 0816)    Pulse 78 (08/11/20 0816)   Resp 20 (08/11/20 0816)    SpO2 94 % (08/11/20 0816)

## 2020-08-11 NOTE — PROGRESS NOTES
Progress Note - Infectious Disease   Janene Rowe 60 y o  male MRN: [id number]  Unit/Bed#: -01 Encounter: [id number]      Impression/Plan:  1  Sepsis, POA   Patient presented on admission with leukocytosis and tachycardia   Source of infection is problem 2  No other obvious sources on exam  Kirsten Prasad is fortunately hemodynamically stable  Continue antibiotics as below  Continue to trend fever curve/vitals  Repeat CBC/chemistry tomorrow  Additional supportive care as per primary      2  Gangrene of right foot great toe with osteomyelitis   Patient has had clinical progression of a previous injury approximately 8 weeks ago where he reports that the bone was exposed at that time   MRI confirms osteomyelitis   Exam consistent with severe necrosis/tissue death and suspected superinfection with foul-smelling drainage  Cultures and susceptibilities reviewed   Now status post amputation in the OR on 8/11 with presumed source control  Continue cefepime through today  Continue Flagyl through today  Will transition to oral Bactrim with Flagyl starting tomorrow 8/17  Discussed low potassium diet, Avoid ACE/ARBs/Potassium supplements  Continue to trend fever curve/vitals  Repeat CBC/chemistry tomorrow  Vascular evaluation appreciated  Ongoing follow-up by Podiatry  Additional supportive care as per primary  If tolerating oral antibiotic and cleared by Podiatry then patient can be discharged from ID standpoint to continue antibiotics      3  Acute kidney injury   Acute elevation in creatinine noted from baseline   Suspect that this was due to acute illness   Has since down trended   Impacts antibiotic dosing  Continue to monitor chemistry closely  Will dose adjust antibiotics as needed     4  Poorly controlled diabetes with neuropathy and vascular disease   Patient noted with hemoglobin A1c of 10   He also has underlying neuropathy as well as peripheral vascular disease   Unfortunately these contribute to poor wound healing and infection as above   Status post angiogram on   Ongoing glucose management as per primary  Vascular surgery evaluation appreciated  Antibiotics as above      5  Obesity   Patient noted with BMI of 30  Can impact antibiotic efficacy/dosing  Higher doses of cefepime as above  Recommend discussion of weight loss as outpatient      6  Thyroid cancer   Patient recently diagnosed with thyroid cancer in July   Currently not on any chemotherapy   Recommend follow-up with oncology/endocrinology as outpatient       Above plan discussed in detail with the patient at bedside      ID consult service will continue to follow         Antibiotics:  Cefepime 5  Flagyl 7  Total antibiotic 8    24 hour events:  Patient underwent amputation this morning  Operative report reviewed--presumed to have source control  Currently afebrile  White blood cell count 10 2  Postoperative x-ray pending  Patient's other vitals are stable    Subjective:  Patient currently denies having any nausea, vomiting, chest pain or shortness of breath  I reviewed with him plans for antibiotic changes and he is hopeful to go home tomorrow  He reports that he generally tolerates antibiotics without issue  Objective:  Vitals:  Temp:  [97 3 °F (36 3 °C)-98 7 °F (37 1 °C)] 97 3 °F (36 3 °C)  HR:  [77-94] 79  Resp:  [16-22] 16  BP: (126-161)/() 145/91  SpO2:  [94 %-97 %] 95 %  Temp (24hrs), Av 9 °F (36 6 °C), Min:97 3 °F (36 3 °C), Max:98 7 °F (37 1 °C)  Current: Temperature: (!) 97 3 °F (36 3 °C)    Physical Exam:   General Appearance:  Alert, interactive, nontoxic, no acute distress  Throat: Oropharynx moist without lesions  Lungs:   Clear to auscultation bilaterally; no wheezes, rhonchi or rales; respirations unlabored on room air   Heart:  RRR; no murmur, rub or gallop appreciated   Abdomen:   Soft, non-tender, non-distended, positive bowel sounds       Extremities: No clubbing, cyanosis or edema   Skin: No new rashes or lesions  No new draining wounds noted  Surgical site currently dressed at this time  Labs, Imaging, & Other studies:   All pertinent labs and imaging studies were personally reviewed  Results from last 7 days   Lab Units 08/11/20  0543 08/08/20  0538 08/07/20  0624   WBC Thousand/uL 10 22* 10 63* 13 82*   HEMOGLOBIN g/dL 12 1 12 1 11 2*   PLATELETS Thousands/uL 416* 361 340     Results from last 7 days   Lab Units 08/11/20  0543  08/04/20  1358   POTASSIUM mmol/L 3 3*   < > 4 7   CHLORIDE mmol/L 100   < > 93*   CO2 mmol/L 27   < > 23   BUN mg/dL 11   < > 33*   CREATININE mg/dL 0 79   < > 1 36*   EGFR ml/min/1 73sq m 100   < > 57   CALCIUM mg/dL 8 6   < > 9 3   AST U/L  --   --  31   ALT U/L  --   --  39   ALK PHOS U/L  --   --  95    < > = values in this interval not displayed  Results from last 7 days   Lab Units 08/04/20  1837 08/04/20  1410 08/04/20  1358   BLOOD CULTURE   --  No Growth After 5 Days  No Growth After 5 Days     GRAM STAIN RESULT  2+ Gram positive cocci in pairs and chains*  1+ Gram negative rods*  No polys seen*  --   --    WOUND CULTURE  3+ Growth of Morganella morganii*  2+ Growth of Citrobacter freundii*  2+ Growth of Enterobacter cloacae complex*  2+ Growth of   --   --

## 2020-08-11 NOTE — PROGRESS NOTES
Went into pt room and pt c/o IV site bleeding  IV was pulled out under the dressing and bleeding  Held pressure, removed IV  Pt refused to let me insert another IV

## 2020-08-11 NOTE — OP NOTE
OPERATIVE REPORT  PATIENT NAME: Rupa Hurley    :  1965  MRN: [id number]  Pt Location: [facility] OR ROOM 04    SURGERY DATE: 2020    Surgeon(s) and Role:     Tg Agosto DPM - Primary    Preop Diagnosis:  Gangrenous toe ([address] ) Lyell Libman  Sepsis ([address] ) [A41 9]    Post-Op Diagnosis Codes:     * Gangrenous toe ([address] ) [I96]     * Sepsis ([address] ) [A41 9]    Procedure(s) (LRB):  AMPUTATION TOE (Right)    Specimen(s):  ID Type Source Tests Collected by Time Destination   1 : Right Great Toe Tissue Toe, [facility]        Estimated Blood Loss:   Minimal    Drains:  * No LDAs found *    Anesthesia Type:   IV Sedation with Anesthesia    Operative Indications:  Gangrenous toe ([address] ) [I96]  Sepsis ([address] ) [A41 9]  Osteomyelitis     Operative Findings:  Soft grey bone, gangrenous toe, malodor    Complications:   None    Procedure and Technique:  Under mild sedation the patient was brought back to the OR and remained supine on the table  Once MAC sedation was achieved a local injection of a 1:1 mixture of 1% lidocaine plain and 0 5% marcaine plain 10 ccs was injected into the right foot about the great toe  The right foot is then scrubbed, prepped and draped in the usual manner  Attention was directed to the right foot  A gangrenous right great toe was noted with malodor and purulence  Using a 15 blade, the digit is debrided away at the MPJ  The head of the metatarsal was noted to be grey and soft  The head of the metatarsal was debrided away and passed off en toto  The area was then inspected and all infected tissue was removed  The area is then flushed with copious amounts of normal sterile saline  The deep tissues are coapted using 2 0 vicryl simple interrupted sutures and then the skin is coapted using 2 0 and 3 0 nylon simple interrupted sutures  He is then dressed with xeroform, DSD and an ACE    He is then transferred to PACU with all VS stable and NVS intact to pre-exam status  He will recover there a short time and then return to his room        Patient Disposition:  PACU     SIGNATURE: Mary Ledezma DPM  DATE: August 11, 2020  TIME: 8:44 AM

## 2020-08-11 NOTE — ASSESSMENT & PLAN NOTE
Lab Results   Component Value Date    HGBA1C 9 7 (H) 08/05/2020       Recent Labs     08/10/20  2054 08/11/20  0815 08/11/20  1121 08/11/20  1538   POCGLU 176* 172* 176* 184*       Blood Sugar Average: Last 72 hrs:  (P) 110 [id number]   Glucose levels better controlled  Continue with current regimen

## 2020-08-11 NOTE — PROGRESS NOTES
Progress Note - Garland Moffett 1965, 60 y o  male MRN: [id number]    Unit/Bed#: -01 Encounter: [id number]    Primary Care Provider: Ladan Garcia MD   Date and time admitted to hospital: 8/4/2020  1:33 PM        PAD (peripheral artery disease) [facility])  Assessment & Plan  Right popliteal occlusion  s/p IR angioplasty  Now cleared for amputation, status post amputation    Hyponatremia  Assessment & Plan  Pseudohyponatremia due to hyperglycemia  Resolved    Acute kidney injury [facility])  Assessment & Plan  Resolved    Results from last 7 days   Lab Units 08/11/20  0543 08/08/20  0538 08/07/20  0624 08/06/20  1151 08/05/20  0506   BUN mg/dL 11 11 14 12 23   CREATININE mg/dL 0 79 0 76 0 75 0 92 0 92       Severe sepsis (HCC)  Assessment & Plan  Likely in setting of gangrenous Great toe, see plan for gangrenous toe    Multiple thyroid nodules  Assessment & Plan  Status post thyroid biopsy  Outpatient follow-up with PCP    Type 2 diabetes mellitus with hyperglycemia, with long-term current use of insulin [facility])  Assessment & Plan  Lab Results   Component Value Date    HGBA1C 9 7 (H) 08/05/2020       Recent Labs     08/10/20  2054 08/11/20  0815 08/11/20  1121 08/11/20  1538   POCGLU 176* 172* 176* 184*       Blood Sugar Average: Last 72 hrs:  (P) 369 [id number]   Glucose levels better controlled  Continue with current regimen    Obesity, Class I, BMI 30 0-34 9 (see actual BMI)  Assessment & Plan  Body mass index is 30 31 kg/m²    Diet and lifestyle modification    Dyslipidemia, goal LDL below 100  Assessment & Plan  Not on medication at home  LDL high, started on lipitor    Gastroesophageal reflux disease without esophagitis  Assessment & Plan  Continue PPI    Hypertension, essential  Assessment & Plan  Blood pressure controlled on verapamil     * Gangrene of toe of right foot [facility])  Assessment & Plan  Status post amputation today  Infectious disease following  Will determine duration of antibiotics post procedure  Continue with current regimen for now      VTE Pharmacologic Prophylaxis:   Pharmacologic: Heparin  Mechanical VTE Prophylaxis in Place: No    Patient Centered Rounds: I have performed bedside rounds with nursing staff today  Discussions with Specialists or Other Care Team Provider: CASSIE Luque    Time Spent for Care: 30 minutes  More than 50% of total time spent on counseling and coordination of care as described above  Current Length of Stay: 7 day(s)    Current Patient Status: Inpatient   Certification Statement: The patient will continue to require additional inpatient hospital stay due to [facility] care    Discharge Plan: To be determined    Code Status: No Order      Subjective:   Patient is experiencing pain at amputation site     Objective:     Vitals:   Temp (24hrs), Av °F (36 7 °C), Min:97 3 °F (36 3 °C), Max:99 °F (37 2 °C)    Temp:  [97 3 °F (36 3 °C)-99 °F (37 2 °C)] 99 °F (37 2 °C)  HR:  [77-94] 79  Resp:  [16-22] 18  BP: (126-161)/() 137/88  SpO2:  [94 %-97 %] 95 %  Body mass index is 30 31 kg/m²  Input and Output Summary (last 24 hours): Intake/Output Summary (Last 24 hours) at 2020 1656  Last data filed at 2020 0900  Gross per 24 hour   Intake 830 ml   Output 975 ml   Net -145 ml       Physical Exam:     Physical Exam  Constitutional:       Appearance: Normal appearance  HENT:      Head: Normocephalic and atraumatic  Cardiovascular:      Rate and Rhythm: Normal rate and regular rhythm  Pulmonary:      Effort: Pulmonary effort is normal       Breath sounds: Normal breath sounds  Abdominal:      General: Abdomen is flat  Bowel sounds are normal       Palpations: Abdomen is soft  Neurological:      General: No focal deficit present  Mental Status: He is alert and oriented to person, place, and time     Psychiatric:         Mood and Affect: Mood normal          Behavior: Behavior normal          Additional Data:     Labs:    Results from last 7 days   Lab Units 08/11/20  0543   WBC Thousand/uL 10 22*   HEMOGLOBIN g/dL 12 1   HEMATOCRIT % 35 2*   PLATELETS Thousands/uL 416*   NEUTROS PCT % 66   LYMPHS PCT % 19   MONOS PCT % 10   EOS PCT % 3     Results from last 7 days   Lab Units 08/11/20  0543   SODIUM mmol/L 136   POTASSIUM mmol/L 3 3*   CHLORIDE mmol/L 100   CO2 mmol/L 27   BUN mg/dL 11   CREATININE mg/dL 0 79   ANION GAP mmol/L 9   CALCIUM mg/dL 8 6   GLUCOSE RANDOM mg/dL 172*         Results from last 7 days   Lab Units 08/11/20  1538 08/11/20  1121 08/11/20  0815 08/10/20  2054 08/10/20  1533 08/10/20  1133 08/10/20  0739 08/09/20  2100 08/09/20  1626 08/09/20  1052 08/09/20  0722 08/08/20  2128   POC GLUCOSE mg/dl 184* 176* 172* 176* 217* 207* 71 202* 220* 141* 78 195*     Results from last 7 days   Lab Units 08/05/20  0506   HEMOGLOBIN A1C % 9 7*     Results from last 7 days   Lab Units 08/05/20  0506 08/04/20  1709   LACTIC ACID mmol/L  --  1 9   PROCALCITONIN ng/ml 0 13  --            * I Have Reviewed All Lab Data Listed Above  * Additional Pertinent Lab Tests Reviewed:  Pradeep [address] Admission Reviewed    Imaging:    Imaging Reports Reviewed Today Include: NA  Imaging Personally Reviewed by Myself Includes:  NA    Recent Cultures (last 7 days):     Results from last 7 days   Lab Units 08/04/20  1837   GRAM STAIN RESULT  2+ Gram positive cocci in pairs and chains*  1+ Gram negative rods*  No polys seen*   WOUND CULTURE  3+ Growth of Morganella morganii*  2+ Growth of Citrobacter freundii*  2+ Growth of Enterobacter cloacae complex*  2+ Growth of        Last 24 Hours Medication List:   Current Facility-Administered Medications   Medication Dose Route Frequency Provider Last Rate    acetaminophen  650 mg Oral Q6H PRN Estefanía Infante DPM      aspirin  81 mg Oral Daily Destin Alexander      atorvastatin  40 mg Oral Daily With [employer]HERMAN      cefepime  2,000 mg Intravenous Balaji Morales MD Stopped (08/11/20 1531)    clopidogrel  75 mg Oral Daily [facility], DPM      dextrose 5 % and sodium chloride 0 9 %  50 mL/hr Intravenous Continuous Lakisha Lombardi MD 50 mL/hr (08/11/20 0544)    heparin (porcine)  5,000 Units Subcutaneous [facility], DPM      hydrOXYzine HCL  25 mg Oral Q6H PRN [facility], DPM      insulin glargine  65 Units Subcutaneous HS [facility], DPM      insulin lispro  1-5 Units Subcutaneous TID [facility], DPM      insulin lispro  1-5 Units Subcutaneous HS [facility], DPM      insulin lispro  6 Units Subcutaneous TID With Meals [facility], DPM      lactated ringers  125 mL/hr Intravenous Continuous [facility], DPM Stopped (08/11/20 1400)    metroNIDAZOLE  500 mg Intravenous Q8H Lamin Garcia  mg (08/11/20 1211)    [START ON 8/12/2020] metroNIDAZOLE  500 mg Oral Q8H [address] Lamin Garcia MD      ondansetron  4 mg Intravenous Q6H PRN [facility], DPM      pantoprazole  40 mg Oral Early Morning [facility], DPM      saccharomyces boulardii  250 mg Oral BID [facility], DPM      sulfamethoxazole-trimethoprim  1 tablet Oral Q12H [address] Lamin Garcia MD      verapamil  240 mg Oral Daily [facility], DPM          Today, Patient Was Seen By: MIKE Meyers      ** Please Note: Dictation voice to text software may have been used in the creation of this document   **

## 2020-08-12 VITALS
TEMPERATURE: 98.5 F | DIASTOLIC BLOOD PRESSURE: 95 MMHG | WEIGHT: 249 LBS | RESPIRATION RATE: 17 BRPM | SYSTOLIC BLOOD PRESSURE: 144 MMHG | OXYGEN SATURATION: 95 % | BODY MASS INDEX: 30.32 KG/M2 | HEIGHT: 76 IN | HEART RATE: 70 BPM

## 2020-08-12 LAB
ANION GAP SERPL CALCULATED.3IONS-SCNC: 8 MMOL/L (ref 4–13)
BASOPHILS # BLD AUTO: 0.06 THOUSANDS/ΜL (ref 0–0.1)
BASOPHILS NFR BLD AUTO: 1 % (ref 0–1)
BUN SERPL-MCNC: 8 MG/DL (ref 5–25)
CALCIUM SERPL-MCNC: 8.8 MG/DL (ref 8.3–10.1)
CHLORIDE SERPL-SCNC: 101 MMOL/L (ref 100–108)
CO2 SERPL-SCNC: 27 MMOL/L (ref 21–32)
CREAT SERPL-MCNC: 0.86 MG/DL (ref 0.6–1.3)
EOSINOPHIL # BLD AUTO: 0.23 THOUSAND/ΜL (ref 0–0.61)
EOSINOPHIL NFR BLD AUTO: 2 % (ref 0–6)
ERYTHROCYTE [DISTWIDTH] IN BLOOD BY AUTOMATED COUNT: 11.9 % (ref 11.6–15.1)
GFR SERPL CREATININE-BSD FRML MDRD: 96 ML/MIN/1.73SQ M
GLUCOSE SERPL-MCNC: 196 MG/DL (ref 65–140)
GLUCOSE SERPL-MCNC: 197 MG/DL (ref 65–140)
GLUCOSE SERPL-MCNC: 98 MG/DL (ref 65–140)
HCT VFR BLD AUTO: 33.7 % (ref 36.5–49.3)
HGB BLD-MCNC: 11.5 G/DL (ref 12–17)
IMM GRANULOCYTES # BLD AUTO: 0.11 THOUSAND/UL (ref 0–0.2)
IMM GRANULOCYTES NFR BLD AUTO: 1 % (ref 0–2)
LYMPHOCYTES # BLD AUTO: 1.9 THOUSANDS/ΜL (ref 0.6–4.47)
LYMPHOCYTES NFR BLD AUTO: 16 % (ref 14–44)
MAGNESIUM SERPL-MCNC: 1.6 MG/DL (ref 1.6–2.6)
MCH RBC QN AUTO: 30.9 PG (ref 26.8–34.3)
MCHC RBC AUTO-ENTMCNC: 34.1 G/DL (ref 31.4–37.4)
MCV RBC AUTO: 91 FL (ref 82–98)
MONOCYTES # BLD AUTO: 1.37 THOUSAND/ΜL (ref 0.17–1.22)
MONOCYTES NFR BLD AUTO: 12 % (ref 4–12)
NEUTROPHILS # BLD AUTO: 8.23 THOUSANDS/ΜL (ref 1.85–7.62)
NEUTS SEG NFR BLD AUTO: 68 % (ref 43–75)
NRBC BLD AUTO-RTO: 0 /100 WBCS
PLATELET # BLD AUTO: 432 THOUSANDS/UL (ref 149–390)
PMV BLD AUTO: 10.6 FL (ref 8.9–12.7)
POTASSIUM SERPL-SCNC: 3.4 MMOL/L (ref 3.5–5.3)
RBC # BLD AUTO: 3.72 MILLION/UL (ref 3.88–5.62)
SODIUM SERPL-SCNC: 136 MMOL/L (ref 136–145)
WBC # BLD AUTO: 11.9 THOUSAND/UL (ref 4.31–10.16)

## 2020-08-12 PROCEDURE — 83735 ASSAY OF MAGNESIUM: CPT | Performed by: STUDENT IN AN ORGANIZED HEALTH CARE EDUCATION/TRAINING PROGRAM

## 2020-08-12 PROCEDURE — 85025 COMPLETE CBC W/AUTO DIFF WBC: CPT | Performed by: STUDENT IN AN ORGANIZED HEALTH CARE EDUCATION/TRAINING PROGRAM

## 2020-08-12 PROCEDURE — 99232 SBSQ HOSP IP/OBS MODERATE 35: CPT | Performed by: INTERNAL MEDICINE

## 2020-08-12 PROCEDURE — 80048 BASIC METABOLIC PNL TOTAL CA: CPT | Performed by: STUDENT IN AN ORGANIZED HEALTH CARE EDUCATION/TRAINING PROGRAM

## 2020-08-12 PROCEDURE — 99239 HOSP IP/OBS DSCHRG MGMT >30: CPT | Performed by: STUDENT IN AN ORGANIZED HEALTH CARE EDUCATION/TRAINING PROGRAM

## 2020-08-12 PROCEDURE — 82948 REAGENT STRIP/BLOOD GLUCOSE: CPT

## 2020-08-12 RX ORDER — POTASSIUM CHLORIDE 20 MEQ/1
40 TABLET, EXTENDED RELEASE ORAL ONCE
Status: DISCONTINUED | OUTPATIENT
Start: 2020-08-12 | End: 2020-08-12 | Stop reason: HOSPADM

## 2020-08-12 RX ORDER — IBUPROFEN 600 MG/1
600 TABLET ORAL EVERY 6 HOURS PRN
Status: DISCONTINUED | OUTPATIENT
Start: 2020-08-12 | End: 2020-08-12 | Stop reason: HOSPADM

## 2020-08-12 RX ORDER — METRONIDAZOLE 500 MG/1
500 TABLET ORAL EVERY 8 HOURS SCHEDULED
Qty: 15 TABLET | Refills: 0 | Status: SHIPPED | OUTPATIENT
Start: 2020-08-12 | End: 2020-08-17

## 2020-08-12 RX ORDER — POTASSIUM CHLORIDE 20 MEQ/1
40 TABLET, EXTENDED RELEASE ORAL DAILY
Qty: 20 TABLET | Refills: 0 | Status: SHIPPED | OUTPATIENT
Start: 2020-08-12 | End: 2020-08-20

## 2020-08-12 RX ORDER — CLOPIDOGREL BISULFATE 75 MG/1
75 TABLET ORAL DAILY
Qty: 30 TABLET | Refills: 0 | Status: SHIPPED | OUTPATIENT
Start: 2020-08-13 | End: 2020-10-05 | Stop reason: SDUPTHER

## 2020-08-12 RX ORDER — SACCHAROMYCES BOULARDII 250 MG
250 CAPSULE ORAL 2 TIMES DAILY
Qty: 20 CAPSULE | Refills: 0 | Status: SHIPPED | OUTPATIENT
Start: 2020-08-12 | End: 2020-08-22

## 2020-08-12 RX ORDER — ASPIRIN 81 MG/1
81 TABLET, CHEWABLE ORAL DAILY
Qty: 30 TABLET | Refills: 0 | Status: SHIPPED | OUTPATIENT
Start: 2020-08-13 | End: 2022-01-10

## 2020-08-12 RX ORDER — ATORVASTATIN CALCIUM 40 MG/1
40 TABLET, FILM COATED ORAL
Qty: 30 TABLET | Refills: 0 | Status: SHIPPED | OUTPATIENT
Start: 2020-08-12 | End: 2020-10-05 | Stop reason: SDUPTHER

## 2020-08-12 RX ORDER — SULFAMETHOXAZOLE AND TRIMETHOPRIM 800; 160 MG/1; MG/1
1 TABLET ORAL EVERY 12 HOURS SCHEDULED
Qty: 10 TABLET | Refills: 0 | Status: SHIPPED | OUTPATIENT
Start: 2020-08-12 | End: 2020-08-17

## 2020-08-12 RX ADMIN — INSULIN LISPRO 1 UNITS: 100 INJECTION, SOLUTION INTRAVENOUS; SUBCUTANEOUS at 13:49

## 2020-08-12 RX ADMIN — CLOPIDOGREL BISULFATE 75 MG: 75 TABLET ORAL at 09:12

## 2020-08-12 RX ADMIN — HEPARIN SODIUM 5000 UNITS: 5000 INJECTION INTRAVENOUS; SUBCUTANEOUS at 00:06

## 2020-08-12 RX ADMIN — SULFAMETHOXAZOLE AND TRIMETHOPRIM 1 TABLET: 800; 160 TABLET ORAL at 09:13

## 2020-08-12 RX ADMIN — INSULIN LISPRO 1 UNITS: 100 INJECTION, SOLUTION INTRAVENOUS; SUBCUTANEOUS at 00:07

## 2020-08-12 RX ADMIN — Medication 250 MG: at 00:02

## 2020-08-12 RX ADMIN — INSULIN GLARGINE 65 UNITS: 100 INJECTION, SOLUTION SUBCUTANEOUS at 00:16

## 2020-08-12 RX ADMIN — METRONIDAZOLE 500 MG: 500 TABLET, FILM COATED ORAL at 05:33

## 2020-08-12 RX ADMIN — IBUPROFEN 600 MG: 600 TABLET ORAL at 14:02

## 2020-08-12 RX ADMIN — ATORVASTATIN CALCIUM 40 MG: 40 TABLET, FILM COATED ORAL at 00:02

## 2020-08-12 RX ADMIN — SULFAMETHOXAZOLE AND TRIMETHOPRIM 1 TABLET: 800; 160 TABLET ORAL at 00:03

## 2020-08-12 RX ADMIN — METRONIDAZOLE 500 MG: 500 TABLET, FILM COATED ORAL at 13:48

## 2020-08-12 RX ADMIN — CEFEPIME HYDROCHLORIDE 2000 MG: 2 INJECTION, POWDER, FOR SOLUTION INTRAVENOUS at 01:13

## 2020-08-12 RX ADMIN — ASPIRIN 81 MG 81 MG: 81 TABLET ORAL at 09:12

## 2020-08-12 RX ADMIN — METRONIDAZOLE 500 MG: 500 INJECTION, SOLUTION INTRAVENOUS at 00:17

## 2020-08-12 RX ADMIN — Medication 250 MG: at 09:13

## 2020-08-12 RX ADMIN — VERAPAMIL HYDROCHLORIDE 240 MG: 120 TABLET, FILM COATED, EXTENDED RELEASE ORAL at 09:13

## 2020-08-12 RX ADMIN — HEPARIN SODIUM 5000 UNITS: 5000 INJECTION INTRAVENOUS; SUBCUTANEOUS at 05:34

## 2020-08-12 RX ADMIN — PANTOPRAZOLE SODIUM 40 MG: 40 TABLET, DELAYED RELEASE ORAL at 05:33

## 2020-08-12 NOTE — ASSESSMENT & PLAN NOTE
Lab Results   Component Value Date    HGBA1C 9 7 (H) 08/05/2020       Recent Labs     08/11/20  1538 08/11/20  2123 08/12/20  0804 08/12/20  1111   POCGLU 184* 192* 98 196*       Blood Sugar Average: Last 72 hrs:  (P) 166 [id number]   Glucose levels adequately controlled during admission  Resume home insulin regimen on discharge  Recommend follow up with outpatient primary care doctor

## 2020-08-12 NOTE — PROGRESS NOTES
Patient is okay to D/C home from Podiatry standpoint  Patient is to leave bandages c/d/i  Would recommend 1 week of oral antibiotics  Ambulate with crutches  F/U in office by Friday

## 2020-08-12 NOTE — NURSING NOTE
Pt has no IV access, and is refusing new IV placement to continue IV antibiotic regimen  As per report, patient refused to take scheduled medications with dinner  Pt is also refusing to eat, thus insulin not given  Most recent glucose was 184 at 1540  Pt states " I don't want to be bothered right now, it should have been passed along " Will attempt to place IV and administer medications at a later time

## 2020-08-12 NOTE — ASSESSMENT & PLAN NOTE
Right popliteal occlusion  s/p IR angioplasty   Will follow up with vascular surgery as an outpatient  Discharge home on lipitor, aspirin, plavix

## 2020-08-12 NOTE — DISCHARGE SUMMARY
Discharge- Kristie Maid 1965, 60 y o  male MRN: [id number]    Unit/Bed#: -01 Encounter: [id number]    Primary Care Provider: Manisha Barry MD   Date and time admitted to hospital: 8/4/2020  1:33 PM        PAD (peripheral artery disease) [facility])  Assessment & Plan  Right popliteal occlusion  s/p IR angioplasty  Will follow up with vascular surgery as an outpatient  Discharge home on lipitor, aspirin, plavix     Hyponatremia  Assessment & Plan  Pseudohyponatremia due to hyperglycemia  Resolved    Acute kidney injury [facility])  Assessment & Plan  Likely in setting of sepsis, now resolved    Severe sepsis [facility])  Assessment & Plan  Likely in setting of gangrenous Great toe, see plan for gangrenous toe    Multiple thyroid nodules  Assessment & Plan  Status post thyroid biopsy  Outpatient follow-up with PCP    Type 2 diabetes mellitus with hyperglycemia, with long-term current use of insulin [facility])  Assessment & Plan  Lab Results   Component Value Date    HGBA1C 9 7 (H) 08/05/2020       Recent Labs     08/11/20  1538 08/11/20  2123 08/12/20  0804 08/12/20  1111   POCGLU 184* 192* 98 196*       Blood Sugar Average: Last 72 hrs:  (P) 166 [id number]   Glucose levels adequately controlled during admission  Resume home insulin regimen on discharge  Recommend follow up with outpatient primary care doctor     Obesity, Class I, BMI 30 0-34 9 (see actual BMI)  Assessment & Plan  Body mass index is 30 31 kg/m²    Counseled on lifestyle modifications    Dyslipidemia, goal LDL below 100  Assessment & Plan  Not on medication previously  Noted to have hyperlipidemia  Started on lipitor, continue as an outpatient    Gastroesophageal reflux disease without esophagitis  Assessment & Plan  Continue home dose prilosec     Hypertension, essential  Assessment & Plan  Continue home dose verapamil    * Gangrene of toe of right foot [facility])  Assessment & Plan  Status post amputation  Infectious disease clearing patient for discharge with PO antibiotics to be completed on 8/17  Stable for discharge, ambulatory referral to podiatry in discharge packet      Discharging Physician / Practitioner: Yadira Farah MD  PCP: Anupama Curiel MD  Admission Date:   Admission Orders (From admission, onward)     Ordered        08/04/20 1453  Inpatient Admission (expected length of stay for this patient Order details is greater than two midnights)  Once                   Discharge Date: 08/12/20    Disposition:      Other: Home    For Discharges to [facility] SNF:   · Not Applicable to this Patient - Not Applicable to this Patient    Reason for Admission: Sepsis    Discharge Diagnoses:     Please see assessment and plan section above for further details regarding discharge diagnoses  Resolved Problems  Date Reviewed: 8/10/2020    None          Consultations During Hospital Stay:  · Podiatry, vascular surgery, Infectious Disease    Procedures Performed: Angioplasty, right great toe amputation, imaging    Medication Adjustments and Discharge Medications:  · Summary of Medication Adjustments made as a result of this hospitalization:  See med rec  · Medication Dosing Tapers - Please refer to Discharge Medication List for details on any medication dosing tapers (if applicable to patient)  · Medications being temporarily held (include recommended restart time):  See med rec  · Discharge Medication List: See after visit summary for reconciled discharge medications  Wound Care Recommendations:  When applicable, please see wound care section of After Visit Summary      Diet Recommendations at Discharge:  Diet -        Diet Orders   (From admission, onward)             Start     Ordered    08/11/20 0817  Diet Gustavo/CHO Controlled; Consistent Carbohydrate Diet Level 2 (5 carb servings/75 grams CHO/meal)  Diet effective now     Question Answer Comment   Diet Type Gustavo/CHO Controlled    Gustavo/CHO Controlled Consistent Carbohydrate Diet Level 2 (5 carb servings/75 grams CHO/meal)    RD to adjust diet per protocol? No        08/11/20 0816                Instructions for any Catheters / Lines Present at Discharge (including removal date, if applicable):  Standard wound care for postop right great toe amputation    Significant Findings / Test Results:   · Sepsis, pseudohyponatremia, acute kidney injury, hyperkalemia    Incidental Findings:   · See above     Test Results Pending at Discharge (will require follow up): · None     Outpatient Tests Requested:  · BMP as an outpatient in 1 week after discharge    Complications:  None    Hospital Course:     [place] Adamson is a 60 y o  male patient who originally presented to the hospital on 8/4/2020 due to sepsis secondary to right great toe gangrene secondary to uncontrolled diabetes  Status post vascular surgery evaluation with right lower extremity angioplasty  Right great toe was later amputated by podiatry  Infectious disease was consulted for antibiotic management, now stable for discharge on 08/12/2020 with recommended follow-up with vascular surgery, primary medical doctor, podiatry  Plan to complete p o  Antibiotics on 08/17/2020  Noted to be hypokalemic while admitted, will order BMP as an outpatient and discharge patient with supplemental potassium      Condition at Discharge: good     Discharge Day Visit / Exam:     Subjective:  Patient feels well today, has no complaints  Vitals: Blood Pressure: 144/95 (08/12/20 0802)  Pulse: 70 (08/11/20 2020)  Temperature: 98 5 °F (36 9 °C) (08/12/20 0802)  Temp Source: Temporal (08/11/20 0646)  Respirations: 17 (08/12/20 0802)  Height: 6' 4" (193 cm) (08/11/20 8993)  Weight - Scale: 113 kg (249 lb) (08/11/20 0646)  SpO2: 95 % (08/11/20 0830)      Goals of Care Discussions:  · Code Status at Discharge: No Order  · Were there any Goals of Care Discussions during Hospitalization?: No  · Results of any General Goals of Care Discussions: na   · POLST Completed: No   · If POLST Completed, Summary of POLST Agreement Provided Here: na     · OK to Rehospitalize if Needed? No    Discharge instructions/Information to patient and family:   See after visit summary section titled Discharge Instructions for information provided to patient and family  Planned Readmission: none      Discharge Statement:  I spent 30 minutes discharging the patient  This time was spent on the day of discharge  I had direct contact with the patient on the day of discharge  Greater than 50% of the total time was spent examining patient, answering all patient questions, arranging and discussing plan of care with patient as well as directly providing post-discharge instructions  Additional time then spent on discharge activities      ** Please Note: This note has been constructed using a voice recognition system **

## 2020-08-12 NOTE — DISCHARGE INSTRUCTIONS
ARTERIOGRAM    WHAT YOU SHOULD KNOW:   An angiogram is a procedure to look at arteries in your body  Arteries are the blood vessels that carry blood from your heart to your body  AFTER YOU LEAVE:     Self-care:   · Limit activity: Rest for the remainder of the day of your procedure  Have some one with you until the next morning  Keep your arm or leg straight as much as possible  Rest as much as possible, sitting lying or reclining  Walk only to go to the bathroom, to bed or to eat  If the angiogram catheter was put in your leg, use the stairs as little as possible  No driving  · Keep your wound clean and dry  You may shower 24 hours after your procedure  The bandage you have on should fall off in 2-3 days  If there is any drainage from the puncture site, you should put on a clean bandage  · Watch for bleeding and bruising: It is normal to have a bruise and soreness where the angiogram catheter went in  · Diet:   · You may resume your regular diet, Sips of flat soda will help with mild nausea  · Drink more liquids than usual for the next 24 hours      · IMMEDIATELY Contact Interventional Radiology at [phone] [place] PATIENTS: Contact Interventional Radiology at [phone]) Venecia Shields PATIENTS: Contact Interventional Radiology at [phone]) if any of the following occur:  · If your bruise gets larger or if you notice any active bleeding  APPLY DIRECT PRESSURE TO THE BLEEDING SITE  · If you notice increased swelling or have increased pain at the puncture site   · If you have any numbness or pain in the extremity of the puncture site   · If that extremity seems cold or pale      · You have fever greater than 101  · Persistent nausea or vomitting    Follow up with your primary healthcare provider  as directed: Write down your questions so you remember to ask them during your visits                          Hypokalemia   WHAT YOU NEED TO KNOW:   Hypokalemia is a low level of potassium in your blood  Potassium helps control how your muscles, heart, and digestive system work  Hypokalemia occurs when your body loses too much potassium or does not absorb enough from food  DISCHARGE INSTRUCTIONS:   Return to the emergency department if:   · You cannot move your arm or leg  · You have a fast or irregular heartbeat  · You are too tired or weak to stand up  Contact your healthcare provider if:   · You are vomiting, or you have diarrhea  · You have numbness or tingling in your arms or legs  · Your symptoms do not go away or they get worse  · You have questions or concerns about your condition or care  Medicines:   · Potassium  will be given to bring your potassium levels back to normal     · Take your medicine as directed  Contact your healthcare provider if you think your medicine is not helping or if you have side effects  Tell him of her if you are allergic to any medicine  Keep a list of the medicines, vitamins, and herbs you take  Include the amounts, and when and why you take them  Bring the list or the pill bottles to follow-up visits  Carry your medicine list with you in case of an emergency  Eat foods that are high in potassium:  Foods that are high in potassium include bananas, oranges, tomatoes, potatoes, and avocado  Brewer beans, turkey, salmon, lean beef, yogurt, and milk are also high in potassium  Ask your healthcare provider or dietitian for more information about foods that are high in potassium  Follow up with your healthcare provider as directed:  Write down your questions so you remember to ask them during your visits  © 2017 [address] [employer] Information is for End User's use only and may not be sold, redistributed or otherwise used for commercial purposes  All illustrations and images included in CareNotes® are the copyrighted property of [employer]  or Richy Murillo  The above information is an  only   It is not intended as medical advice for individual conditions or treatments  Talk to your doctor, nurse or pharmacist before following any medical regimen to see if it is safe and effective for you  Diabetic Foot Ulcers   WHAT YOU NEED TO KNOW:   What is a diabetic foot ulcer? A diabetic foot ulcer is an open sore that can develop anywhere on your foot or toes  The ulcers usually develop on the bottom of the foot  You may first notice drainage on your sock  Drainage is fluid that may be yellow, brown, or red  The fluid may also contain pus or blood  What increases my risk for a diabetic foot ulcer? · Blood sugar levels that are not controlled    · Nerve damage and numbness in your feet    · Poor blood flow     · A foot deformity, such as a bunion or hammertoe    · Calluses or corns on your feet or toes    · A decrease in vision that keeps you from seeing your feet clearly    · Being overweight    · Cigarette smoking or alcohol use  How is a diabetic foot ulcer diagnosed and treated? Your healthcare provider will ask about your symptoms and examine your foot and the ulcer  He may check your shoes  He may also send you to a podiatrist (foot doctor) for treatment  The goal of treatment is to start healing your foot ulcer as soon as possible  The risk for infection decreases with faster healing  Do the following to help your ulcer heal:  · Prevent or treat an infection  A bandage will be put on your ulcer  Your healthcare provider will give you instructions on changing your bandage  You may need to clean the wound and change the bandage daily  The bandage may contain medicines to help your ulcer heal  You may be asked to put medicine on your foot ulcer before you put on the bandage  The medicine may also prevent growth of tissue that is not healthy  If you have an infection, your healthcare provider will give you antibiotics to treat it  · Have any dead tissue debrided (removed)    The removal of dead skin and tissue around your foot ulcer can help with healing  · Manage your blood sugar levels and other health problems  Your blood sugar, blood pressure, and cholesterol levels need to be controlled to help your foot ulcer heal  Your healthcare provider can help you make a plan to manage your health problems  · Offload (take the pressure off) the foot ulcer  You may need special shoes with insoles, cushions, or braces  You may be asked to use a wheelchair or crutches until your foot ulcer heals  These items will help keep pressure and irritation off the area of your foot ulcer  Your foot ulcer can heal faster without pressure and irritation  · Have blood flow to your foot increased  Your healthcare provider may use hyperbaric oxygen therapy or negative pressure wound therapy to increase blood flow  Ask for more information about these therapies  · Go to specialists as directed  Your healthcare provider may recommend you see a podiatrist, or an orthopedic or vascular surgeon  These healthcare providers can help manage your treatment  What can I do to prevent diabetic foot ulcers? Good foot care may help prevent ulcers, or keep them from getting worse  Ask someone to help you if you are not able to check or care for your feet  The following can help you prevent diabetic foot ulcers:  · Keep your blood sugar levels under control  Continue the plan for your diabetes that you and your healthcare provider have discussed  Healthy food choices and taking your medicines as directed may help control blood sugars  Contact your healthcare provider if your blood sugar levels are higher than directed  · Wash your feet each day with soap and warm water  Do not use hot water, because this can injure your foot  Dry your feet gently with a towel after you wash them  Dry between and under your toes  Put lotion or moisturizer on your feet  Do not  put lotion or moisturizer between your toes  · Check your feet each day  Look at your whole foot, including the bottom, and between and under your toes  Check for wounds, corns, and calluses  Feel your feet by running your hands along the tops, bottoms, sides, and between your toes  Use a nonbreakable mirror to check your feet if you have trouble seeing the bottoms  Do not try to remove corns or calluses yourself  File or cut your toenails straight across  · Protect your feet  Do not walk barefoot or wear your shoes without socks  Check your shoes for rocks or other objects that can hurt your feet  Wear cotton socks to help keep your feet dry  Wear socks without toe seams, or wear them with the seams inside out  Change your socks each day  Do not wear socks that are dirty or damp  · Wear shoes that fit well  Wear shoes that do not rub against any area of your feet  Your shoes should be ½ to ¾ inch (1 to 2 centimeters) longer than your feet  Your shoes should also have extra space around the widest part of your feet  Walking or athletic shoes with laces or straps that adjust are best  Ask your healthcare provider for help to choose the right shoes for you  Ask him if you need to wear an insert, orthotic, or bandage on your feet  · Do not smoke  Nicotine can damage your blood vessels and increase your risk for foot ulcers  Do not use e-cigarettes or smokeless tobacco in place of cigarettes or to help you quit  They still contain nicotine  Ask your healthcare provider for information if you currently smoke and need help quitting  · Do not drink alcohol  Alcohol increases your blood sugar levels and make your diabetes more difficult to manage  Ask your healthcare provider for information if you need help to quit drinking alcohol  · Maintain a healthy weight  Ask your healthcare provider how much you should weigh  A healthy weight can help you control your diabetes  Ask him to help you create a weight loss plan if you are overweight   Even a 10 to 15 pound weight loss can help you manage your blood sugar level  When should I or someone close to me call 911? · You feel faint or become confused  When should I seek immediate care? · You have a fever with chills  · You begin vomiting  When should I contact my healthcare provider? · You see new drainage on your sock  · Your foot becomes red, warm, and swollen  · Your foot ulcer has a bad smell or is draining pus  · You feel pain in a foot that used to have little or no feeling  · You see black or dead tissue in or around your ulcer  · Your ulcer becomes bigger, deeper, or does not heal      · You have questions or concerns about your condition or care  CARE AGREEMENT:   You have the right to help plan your care  Learn about your health condition and how it may be treated  Discuss treatment options with your caregivers to decide what care you want to receive  You always have the right to refuse treatment  The above information is an  only  It is not intended as medical advice for individual conditions or treatments  Talk to your doctor, nurse or pharmacist before following any medical regimen to see if it is safe and effective for you  © 2017 [address] [facility] Information is for End User's use only and may not be sold, redistributed or otherwise used for commercial purposes  All illustrations and images included in CareNotes® are the copyrighted property of [employer]  or [employer]  Metronidazole (By mouth)   Metronidazole (met-amanda-RENAN-da-zole)  Treats bacterial infections  Brand Name(s): Flagyl, Flagyl 375   There may be other brand names for this medicine  When This Medicine Should Not Be Used: This medicine is not right for everyone  Do not use if you had an allergic reaction to metronidazole or similar medicines  Do not use this medicine to treat trichomoniasis if you are in the first 3 months of pregnancy    How to Use This Medicine: Capsule, Tablet, Long Acting Tablet  · Take this medicine as directed, and take it at the same time each day  · Capsule or Tablet: Take with food or milk to avoid stomach upset  · Extended-release tablet: Take it on an empty stomach, 1 hour before or 2 hours after a meal   · Swallow the extended-release tablet whole  Do not crush, break, or chew it  · Take all of the medicine in your prescription to clear up your infection, even if you feel better after the first few doses  · Missed dose: Take a dose as soon as you remember  If it is almost time for your next dose, wait until then and take a regular dose  Do not take extra medicine to make up for a missed dose  · Store the medicine in a closed container at room temperature, away from heat, moisture, and direct light  Drugs and Foods to Avoid:   Ask your doctor or pharmacist before using any other medicine, including over-the-counter medicines, vitamins, and herbal products  · Do not use this medicine if you have taken disulfiram within the last 2 weeks  Do not drink alcohol or use medicine that contains alcohol or propylene glycol while using this medicine and for at least 3 days after you have finished metronidazole treatment  · Some foods and medicines can affect how metronidazole works  Tell your doctor if you are using busulfan, cimetidine, lithium, phenobarbital, phenytoin, or warfarin or another blood thinner  Warnings While Using This Medicine:   · Tell your doctor if you are pregnant or breastfeeding, or if you have kidney disease, liver disease, blood or bone marrow problems, oral thrush, yeast infection, or a history of seizures  · This medicine may cause the following problems:  ¨ Brain or nervous system problems, including seizures, meningitis, or vision problems  · Trichomoniasis treatment:  Your doctor may want to also treat your sexual partner, even if he or she has no symptoms  Also, you may want to use a condom during sexual intercourse  These measures will help keep you from getting the infection back again from your partner  If you have any questions, ask your doctor  · Call your doctor if your symptoms do not improve or if they get worse  · Your doctor will do lab tests at regular visits to check on the effects of this medicine  Keep all appointments  · Tell any doctor or dentist who treats you that you are using this medicine  This medicine may affect certain medical test results  · Keep all medicine out of the reach of children  Never share your medicine with anyone  Possible Side Effects While Using This Medicine:   Call your doctor right away if you notice any of these side effects:  · Allergic reaction: Itching or hives, swelling in your face or hands, swelling or tingling in your mouth or throat, chest tightness, trouble breathing  · Confusion, drowsiness, fever, headache, loss of appetite, nausea or vomiting, stiff neck or back  · Dizziness, problems with muscle control, clumsiness, shakiness, trouble talking  · Fever, chills, cough, sore throat, and body aches  · Numbness, tingling, or burning pain in your hands, arms, legs, or feet  · Seizures  If you notice these less serious side effects, talk with your doctor:   · Mild nausea  · Unusual or unpleasant taste in your mouth  If you notice other side effects that you think are caused by this medicine, tell your doctor  Call your doctor for medical advice about side effects  You may report side effects to FDA at 5-591-FDA-4292  © 2017 [address] [employer] Information is for End User's use only and may not be sold, redistributed or otherwise used for commercial purposes  The above information is an  only  It is not intended as medical advice for individual conditions or treatments  Talk to your doctor, nurse or pharmacist before following any medical regimen to see if it is safe and effective for you        Sulfamethoxazole/Trimethoprim (By mouth) Sulfamethoxazole (sul-fa-meth-OX-a-zole), Trimethoprim (trye-METH-oh-prim)  Treats or prevents infections  Brand Name(s): Bactrim, Bactrim DS, SMZ-TMP Pediatric, Sulfatrim, Sulfatrim Pediatric   There may be other brand names for this medicine  When This Medicine Should Not Be Used: This medicine is not right for everyone  Do not use it if you had an allergic reaction to trimethoprim, sulfamethoxazole, or any sulfa drug  Do not use this medicine if you are pregnant, if you have anemia caused by low levels of folic acid, or if you have a history of drug-induced thrombocytopenia  How to Use This Medicine:   Liquid, Tablet  · Your doctor will tell you how much medicine to use  Do not use more than directed  · Measure the oral liquid medicine with a marked measuring spoon, oral syringe, or medicine cup  · Drink extra fluids so you will urinate more often and help prevent kidney problems  · Take all of the medicine in your prescription to clear up your infection, even if you feel better after the first few doses  · Missed dose: Take a dose as soon as you remember  If it is almost time for your next dose, wait until then and take a regular dose  Do not take extra medicine to make up for a missed dose  · Store the medicine in a closed container at room temperature, away from heat, moisture, and direct light  Do not freeze the oral liquid  Drugs and Foods to Avoid:   Ask your doctor or pharmacist before using any other medicine, including over-the-counter medicines, vitamins, and herbal products  · Some medicines can affect how this medicine works   Tell your doctor if you also use the following:   ¨ amantadine, cyclosporine, digoxin, indomethacin, memantine, methotrexate, phenytoin, pyrimethamine, or warfarin  ¨ an ACE inhibitor, diabetes medicine (glipizide, glyburide, metformin, pioglitazone, repaglinide, rosiglitazone), a diuretic (water pill, such as hydrochlorothiazide), or a tricyclic antidepressant  Warnings While Using This Medicine:   · It is not safe to take this medicine during pregnancy  It could harm an unborn baby  Tell your doctor right away if you become pregnant  · Tell your doctor if you are breastfeeding, or if you have kidney disease, liver disease, diabetes, malabsorption or malnutrition, folate deficiency, porphyria, thyroid problems, or a history of alcoholism  Tell your doctor if you have asthma or severe allergies, especially if you are allergic to any medicines  It is important for your doctor to know if you have HIV or AIDS, because this medicine might work differently for you  · This medicine may cause a severe allergic reaction  · This medicine may lower the number of platelets in your body, which are necessary for proper blood clotting  This may cause you to bleed or get infections more easily  Talk with your doctor if you have concerns about this  · This medicine can cause diarrhea  Call your doctor if the diarrhea becomes severe, does not stop, or is bloody  Do not take any medicine to stop diarrhea until you have talked to your doctor  Diarrhea can occur 2 months or more after you stop taking this medicine  · Tell any doctor or dentist who treats you that you are using this medicine  This medicine may affect certain medical test results  · Your doctor will do lab tests at regular visits to check on the effects of this medicine  Keep all appointments  · Keep all medicine out of the reach of children  Never share your medicine with anyone    Possible Side Effects While Using This Medicine:   Call your doctor right away if you notice any of these side effects:  · Allergic reaction: Itching or hives, swelling in your face or hands, swelling or tingling in your mouth or throat, chest tightness, trouble breathing  · Blistering, peeling, or red skin rash  · Dark urine or pale stools, nausea, vomiting, loss of appetite, stomach pain, yellow skin or eyes  · Chest pain, cough, or trouble breathing  · Confusion, weakness  · Muscle twitching  · Severe diarrhea, stomach pain, cramps, bloating  · Skin rash, purple spots on your skin, or very pale or yellow skin  · Sore throat, fever, muscle pain  · Uneven heartbeat, numbness or tingling in your hands, feet, or lips  · Unusual bleeding, bruising, or weakness  If you notice these less serious side effects, talk with your doctor:   · Mild nausea, vomiting, or loss of appetite  If you notice other side effects that you think are caused by this medicine, tell your doctor  Call your doctor for medical advice about side effects  You may report side effects to FDA at 4-956-FDA-4862  © 2017 [address] [employer] Information is for End User's use only and may not be sold, redistributed or otherwise used for commercial purposes  The above information is an  only  It is not intended as medical advice for individual conditions or treatments  Talk to your doctor, nurse or pharmacist before following any medical regimen to see if it is safe and effective for you

## 2020-08-12 NOTE — PROGRESS NOTES
Progress Note - Infectious Disease   Amy Reasons 60 y o  male MRN: [id number]  Unit/Bed#: -01 Encounter: [id number]      Impression/Plan:  1  Sepsis, POA   Patient presented on admission with leukocytosis and tachycardia   Source of infection is problem 2  No other obvious sources on exam  Robb Kj is fortunately hemodynamically stable  Continue antibiotics as below  Continue to trend fever curve/vitals while admitted  Additional supportive care as per primary      2  Gangrene of right foot great toe with osteomyelitis   Patient has had clinical progression of a previous injury approximately 8 weeks ago where he reports that the bone was exposed at that time   MRI confirms osteomyelitis   Exam consistent with severe necrosis/tissue death and suspected superinfection with foul-smelling drainage  Cultures and susceptibilities reviewed   Now status post amputation in the OR on 8/11 with presumed source control  Continue oral Bactrim and Flagyl through 8/17  Discussed low potassium diet, Avoid ACE/ARBs/Potassium supplements  Continue to trend fever curve/vitals while admitted  Vascular and Podiatry evaluations appreciated  Additional supportive care as per primary  Patient otherwise cleared from ID standpoint for discharge     3  Acute kidney injury   Acute elevation in creatinine noted from baseline   Suspect that this was due to acute illness   Has since down trended   Impacts antibiotic dosing  Continue to monitor chemistry while admitted  Will dose adjust antibiotics as needed     4  Poorly controlled diabetes with neuropathy and vascular disease   Patient noted with hemoglobin A1c of 10  He also has underlying neuropathy as well as peripheral vascular disease   Unfortunately these contribute to poor wound healing and infection as above   Status post angiogram on 08/06    Ongoing glucose management as per primary  Vascular surgery evaluation appreciated  Antibiotics as above      5  Obesity   Patient noted with BMI of 30  Can impact antibiotic efficacy/dosing  Higher doses of cefepime as above  Recommend discussion of weight loss as outpatient      6  Thyroid cancer   Patient recently diagnosed with thyroid cancer in July   Currently not on any chemotherapy   Recommend follow-up with oncology/endocrinology as outpatient       Above plan discussed in detail with the patient at bedside  Above plan discussed in detail with primary service attending      ID consult service will sign off at this time  Please call if questions  Antibiotics:  Bactrim 2  Flagyl 8  Total antibiotic 9    24 hour events:  No acute events noted overnight on chart review  Patient refused various medications and IV placement and blood work this morning  No new cultures  No new imaging  Patient's other vitals are stable  No new labs this morning    Subjective:  Patient currently denies having any nausea, vomiting, chest pain or shortness of breath  He is tolerating oral antibiotic without issue  We reviewed again low potassium diet and avoiding certain medications as well as avoidance of alcohol  Patient is determined to go home today  Objective:  Vitals:  Temp:  [98 5 °F (36 9 °C)-99 °F (37 2 °C)] 98 5 °F (36 9 °C)  HR:  [70] 70  Resp:  [16-20] 17  BP: (132-144)/() 144/95  Temp (24hrs), Av 7 °F (37 1 °C), Min:98 5 °F (36 9 °C), Max:99 °F (37 2 °C)  Current: Temperature: 98 5 °F (36 9 °C)    Physical Exam:   General Appearance:  Alert, interactive, nontoxic, no acute distress  Throat: Oropharynx moist without lesions  Lungs:   Clear to auscultation bilaterally; no wheezes, rhonchi or rales; respirations unlabored on room air   Heart:  RRR; no murmur, rub or gallop appreciated   Abdomen:   Soft, non-tender, non-distended, positive bowel sounds  Extremities: No clubbing, cyanosis or edema   Skin: No new rashes or lesions  No new draining wounds noted  Surgical site currently dressed at this time         Labs, Imaging, & Other studies:   All pertinent labs and imaging studies were personally reviewed  Results from last 7 days   Lab Units 08/11/20  0543 08/08/20  0538 08/07/20  0624   WBC Thousand/uL 10 22* 10 63* 13 82*   HEMOGLOBIN g/dL 12 1 12 1 11 2*   PLATELETS Thousands/uL 416* 361 340     Results from last 7 days   Lab Units 08/11/20  0543   POTASSIUM mmol/L 3 3*   CHLORIDE mmol/L 100   CO2 mmol/L 27   BUN mg/dL 11   CREATININE mg/dL 0 79   EGFR ml/min/1 73sq m 100   CALCIUM mg/dL 8 6

## 2020-08-12 NOTE — ASSESSMENT & PLAN NOTE
Not on medication previously  Noted to have hyperlipidemia  Started on lipitor, continue as an outpatient

## 2020-08-12 NOTE — PROGRESS NOTES
Pt AAOx4 in bed  Pt offers c/o pain at this time stating that it is "neuropathy pain " Pt requesting ibuprofen 600mg  [place] text to doctor  All AM medications given  Call bell in reach

## 2020-08-12 NOTE — ASSESSMENT & PLAN NOTE
Status post amputation  Infectious disease clearing patient for discharge with PO antibiotics to be completed on 8/17  Stable for discharge, ambulatory referral to podiatry in discharge packet

## 2020-08-13 NOTE — UTILIZATION REVIEW
Notification of Discharge  This is a Notification of Discharge from our facility [address]  Please be advised that this patient has been discharge from our facility  Below you will find the admission and discharge date and time including the patients disposition  PRESENTATION DATE: 8/4/2020  1:33 PM  OBS ADMISSION DATE:   IP ADMISSION DATE: 8/4/20 1453   DISCHARGE DATE: 8/12/2020  4:03 PM  DISPOSITION: Home/Self Care Home/Self Care   Admission Orders listed below:  Admission Orders (From admission, onward)     Ordered        08/04/20 1453  Inpatient Admission (expected length of stay for this patient Order details is greater than two midnights)  Once                   Please contact the UR Department if additional information is required to close this patient's authorization/case  [facility] Utilization Review Department  Main: [phone] x carefully listen to the prompts  All voicemails are confidential   [email or web address]  Send all requests for admission clinical reviews, approved or denied determinations and any other requests to dedicated fax number below belonging to the campus where the patient is receiving treatment   List of dedicated fax numbers:  [address] [place] [address] DENIALS (Administrative/Medical Necessity) [phone]   [address]  (Maternity/NICU/Pediatrics) [phone]   Nikhil Seen [phone]   Genesis Blancas [phone]   Delorise Omar [phone]   [address] [phone]   [facility]  [phone]   [address] [facility], [facility]  [address] [facility] [phone]

## 2020-08-14 ENCOUNTER — TRANSITIONAL CARE MANAGEMENT (OUTPATIENT)
Dept: FAMILY MEDICINE CLINIC | Facility: CLINIC | Age: 55
End: 2020-08-14

## 2020-08-20 ENCOUNTER — OFFICE VISIT (OUTPATIENT)
Dept: FAMILY MEDICINE CLINIC | Facility: CLINIC | Age: 55
End: 2020-08-20
Payer: COMMERCIAL

## 2020-08-20 VITALS
DIASTOLIC BLOOD PRESSURE: 72 MMHG | BODY MASS INDEX: 29.71 KG/M2 | OXYGEN SATURATION: 99 % | SYSTOLIC BLOOD PRESSURE: 114 MMHG | HEART RATE: 82 BPM | WEIGHT: 244 LBS | TEMPERATURE: 97.3 F | HEIGHT: 76 IN

## 2020-08-20 DIAGNOSIS — I96 GANGRENE OF TOE OF RIGHT FOOT (HCC): ICD-10-CM

## 2020-08-20 DIAGNOSIS — I10 HYPERTENSION, ESSENTIAL: ICD-10-CM

## 2020-08-20 DIAGNOSIS — E11.65 TYPE 2 DIABETES MELLITUS WITH HYPERGLYCEMIA, WITH LONG-TERM CURRENT USE OF INSULIN (HCC): ICD-10-CM

## 2020-08-20 DIAGNOSIS — Z79.4 TYPE 2 DIABETES MELLITUS WITH DIABETIC POLYNEUROPATHY, WITH LONG-TERM CURRENT USE OF INSULIN (HCC): ICD-10-CM

## 2020-08-20 DIAGNOSIS — N17.9 ACUTE KIDNEY INJURY (HCC): ICD-10-CM

## 2020-08-20 DIAGNOSIS — Z79.4 TYPE 2 DIABETES MELLITUS WITH HYPERGLYCEMIA, WITH LONG-TERM CURRENT USE OF INSULIN (HCC): ICD-10-CM

## 2020-08-20 DIAGNOSIS — S98.111A AMPUTATION OF RIGHT GREAT TOE (HCC): ICD-10-CM

## 2020-08-20 DIAGNOSIS — G47.00 INSOMNIA, UNSPECIFIED TYPE: Primary | ICD-10-CM

## 2020-08-20 DIAGNOSIS — C73 THYROID CANCER (HCC): ICD-10-CM

## 2020-08-20 DIAGNOSIS — E11.42 TYPE 2 DIABETES MELLITUS WITH DIABETIC POLYNEUROPATHY, WITH LONG-TERM CURRENT USE OF INSULIN (HCC): ICD-10-CM

## 2020-08-20 DIAGNOSIS — I73.9 PAD (PERIPHERAL ARTERY DISEASE) (HCC): ICD-10-CM

## 2020-08-20 DIAGNOSIS — E78.5 HYPERLIPIDEMIA, UNSPECIFIED HYPERLIPIDEMIA TYPE: ICD-10-CM

## 2020-08-20 DIAGNOSIS — A41.9 SEVERE SEPSIS (HCC): ICD-10-CM

## 2020-08-20 DIAGNOSIS — R65.20 SEVERE SEPSIS (HCC): ICD-10-CM

## 2020-08-20 DIAGNOSIS — G54.6 PHANTOM PAIN AFTER AMPUTATION OF LOWER EXTREMITY (HCC): ICD-10-CM

## 2020-08-20 PROBLEM — E04.2 MULTIPLE THYROID NODULES: Status: RESOLVED | Noted: 2020-03-06 | Resolved: 2020-08-20

## 2020-08-20 PROCEDURE — 99495 TRANSJ CARE MGMT MOD F2F 14D: CPT | Performed by: FAMILY MEDICINE

## 2020-08-20 PROCEDURE — 3008F BODY MASS INDEX DOCD: CPT | Performed by: FAMILY MEDICINE

## 2020-08-20 PROCEDURE — 1111F DSCHRG MED/CURRENT MED MERGE: CPT | Performed by: FAMILY MEDICINE

## 2020-08-20 PROCEDURE — 3066F NEPHROPATHY DOC TX: CPT | Performed by: FAMILY MEDICINE

## 2020-08-20 RX ORDER — METHIMAZOLE 5 MG/1
TABLET ORAL
COMMUNITY
Start: 2020-08-03 | End: 2020-10-05

## 2020-08-20 RX ORDER — ZOLPIDEM TARTRATE 10 MG/1
10 TABLET ORAL
Qty: 30 TABLET | Refills: 1 | Status: SHIPPED | OUTPATIENT
Start: 2020-08-20 | End: 2020-12-03 | Stop reason: SDUPTHER

## 2020-08-20 NOTE — PROGRESS NOTES
Assessment/Plan:  Return visit in 2 months       Problem List Items Addressed This Visit        Endocrine    Type 2 diabetes mellitus with hyperglycemia, with long-term current use of insulin ([place] [address] )    Type 2 diabetes mellitus with diabetic polyneuropathy, with long-term current use of insulin ([facility])       Lab Results   Component Value Date    HGBA1C 9 7 (H) 08/05/2020   Continue Lantus 80 units at bedtime and a P drip 40 units t i d  Thyroid cancer [facility])     Follow-up with endocrinology and surgery         Relevant Medications    methimazole (TAPAZOLE) 5 mg tablet       Cardiovascular and Mediastinum    Hypertension, essential     Continue Avalide 300-12 5 mg and verapamil 360 mg daily         PAD (peripheral artery disease) ([facility])     Follow-up with vascular surgery            Genitourinary    Acute kidney injury ([place] [address] )       Other    Hyperlipidemia    Gangrene of toe of right foot ([place] [address] )    Severe sepsis ([facility])    Amputation of right great toe ([place] [address] )     Follow-up with Podiatry         Phantom 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 ([place] [address] )      Other Visit Diagnoses     Insomnia, unspecified type    -  Primary    Relevant Medications    zolpidem (AMBIEN) 10 mg tablet            Subjective:      Patient ID: Renetta Cabrera is a 60 y o  male  TCM Call (since 7/20/2020)     Date and time call was made  8/14/2020  2:17 PM    Hospital care reviewed  Records reviewed    Patient was hospitialized at  [facility]    Date of Admission  08/04/20    Date of discharge  08/12/20    Diagnosis  Gangrene of toe of right foot     Disposition  Home    Were the patients medications reviewed and updated  Yes      TCM Call (since 7/20/2020)     Post hospital issues  None    Should patient be enrolled in anticoag monitoring? No    Scheduled for follow up?   Yes    Did you obtain your prescribed medications  Yes    Do you need help managing your prescriptions or medications  No    Is transportation to your appointment needed No    I have advised the patient to call PCP with any new or worsening symptoms  Helen Chacon/vinay  Living Arrangements  Family members    Support System  Family    The type of support provided  Emotional    Do you have social support  Yes, as much as I need    Are you recieving any outpatient services  No    Are you recieving home care services  No    Are you using any community resources  No    Current waiver services  No    Have you fallen in the last 12 months  No    Interperter language line needed  No    Counseling  Patient    Counseling topics  patient and family education; Activities of daily living; Importance of RX compliance    Comments  spoke with pt on 8/14/20 after his hospital d/c on 8/12/20  Right great toe was amputated by podiatry  Infectious disease was consulted for antibiotic management, pt reports that he is doing well at home  he is taking all his medications and antibiotics as prescribed  pt will see PCP on 8/20/20 for a hospital f/u  he is aware to call our office if they have any questions or concern  ER/CMA  Patient comes in for hospital follow-up  He had gangrene of his right great toe with severe sepsis and acute kidney injury  He had balloon angioplasty was right lower extremity and amputation of his right great toe  His insulin was increased in the hospital in his blood sugars are now much better controlled  He complains of phantom pain and difficulty sleeping at night  He also recently had thyroid biopsy which showed papillary carcinoma  Thyroidectomy is planned   The following portions of the patient's history were reviewed and updated as appropriate:   He has a past medical history of Diabetes mellitus ([facility] ), Hyperlipidemia, Hypertension, and Thyroid cancer ([facility] )  ,  does not have any pertinent problems on file  ,   has a past surgical history that includes Foot surgery (Right, 2014); US guided thyroid biopsy (7/2/2020);  IR abdominal angiography / intervention (8/6/2020); and Toe amputation (Right, 8/11/2020)  ,  family history includes Cancer in his mother; Hypertension in his father  ,   reports that he has never smoked  He has never used smokeless tobacco  He reports previous alcohol use of about 8 0 standard drinks of alcohol per week  He reports that he does not use drugs  ,  has No Known Allergies     Current Outpatient Medications   Medication Sig Dispense Refill    aspirin 81 mg chewable tablet Chew 1 tablet (81 mg total) daily 30 tablet 0    atorvastatin (LIPITOR) 40 mg tablet Take 1 tablet (40 mg total) by mouth daily with dinner 30 tablet 0    AVALIDE 300-12 5 MG per tablet Take 1 tablet by mouth daily 90 tablet 3    BD PEN NEEDLE DEV U/F 32G X 4 MM MISC Inject under the skin 4 (four) times a day 400 each 1    Blood Glucose Monitoring Suppl (ONE TOUCH ULTRA 2) w/Device KIT by Does not apply route 2 (two) times a day 1 each 0    clopidogrel (PLAVIX) 75 mg tablet Take 1 tablet (75 mg total) by mouth daily 30 tablet 0    glucose blood test strip Check blood sugar daily 100 each 3    insulin glargine (Lantus SoloStar) 100 units/mL injection pen 80 u bedtime 9 pen 5    insulin glulisine (Apidra SoloStar) 100 units/mL injection pen Inject 40 Units under the skin 3 (three) times a day with meals 5 pen 6    omeprazole (PriLOSEC) 40 MG capsule TAKE 1 CAPSULE DAILY 90 capsule 3    saccharomyces boulardii (FLORASTOR) 250 mg capsule Take 1 capsule (250 mg total) by mouth 2 (two) times a day for 10 days 20 capsule 0    verapamil (VERELAN PM) 360 MG 24 hr capsule Take 360 mg by mouth daily       methimazole (TAPAZOLE) 5 mg tablet take 1 tablet by mouth once daily (STOP MEDICATION AND CALL DOCTO     (REFER TO PRESCRIPTION NOTES)   zolpidem (AMBIEN) 10 mg tablet Take 1 tablet (10 mg total) by mouth daily at bedtime as needed for sleep 30 tablet 1     No current facility-administered medications for this visit          Review of Systems   Constitutional: Positive for chills  Negative for fatigue  Respiratory: Negative  Cardiovascular: Negative  Gastrointestinal: Negative  Musculoskeletal: Positive for back pain  Objective:  Vitals:    08/20/20 1433   BP: 114/72   BP Location: Left arm   Patient Position: Sitting   Pulse: 82   Temp: (!) 97 3 °F (36 3 °C)   TempSrc: Tympanic   SpO2: 99%   Weight: 111 kg (244 lb)   Height: 6' 4" (1 93 m)     Body mass index is 29 7 kg/m²  Physical Exam  Constitutional:       Appearance: Normal appearance  He is well-developed  HENT:      Head: Normocephalic and atraumatic  Eyes:      Pupils: Pupils are equal, round, and reactive to light  Neck:      Musculoskeletal: Neck supple  Cardiovascular:      Rate and Rhythm: Normal rate and regular rhythm  Heart sounds: Normal heart sounds  Pulmonary:      Effort: Pulmonary effort is normal       Breath sounds: Normal breath sounds  Abdominal:      General: Bowel sounds are normal       Palpations: Abdomen is soft  Musculoskeletal:      Comments: Amputated right great toe with mild drainage from surgical site  Skin:     General: Skin is warm and dry  Neurological:      Mental Status: He is alert and oriented to person, place, and time  Psychiatric:         Thought Content:  Thought content normal

## 2020-08-20 NOTE — ASSESSMENT & PLAN NOTE
Lab Results   Component Value Date    HGBA1C 9 7 (H) 08/05/2020   Continue Lantus 80 units at bedtime and a P drip 40 units t i d

## 2020-08-28 ENCOUNTER — HOSPITAL ENCOUNTER (INPATIENT)
Facility: HOSPITAL | Age: 55
LOS: 24 days | Discharge: HOME HEALTH | DRG: 474 | End: 2020-09-21
Attending: EMERGENCY MEDICINE | Admitting: FAMILY MEDICINE
Payer: COMMERCIAL

## 2020-08-28 ENCOUNTER — APPOINTMENT (INPATIENT)
Dept: MRI IMAGING | Facility: HOSPITAL | Age: 55
DRG: 474 | End: 2020-08-28
Payer: COMMERCIAL

## 2020-08-28 ENCOUNTER — APPOINTMENT (INPATIENT)
Dept: RADIOLOGY | Facility: HOSPITAL | Age: 55
DRG: 474 | End: 2020-08-28
Payer: COMMERCIAL

## 2020-08-28 DIAGNOSIS — Z79.4 TYPE 2 DIABETES MELLITUS WITH DIABETIC POLYNEUROPATHY, WITH LONG-TERM CURRENT USE OF INSULIN (HCC): ICD-10-CM

## 2020-08-28 DIAGNOSIS — E11.42 TYPE 2 DIABETES MELLITUS WITH DIABETIC POLYNEUROPATHY, WITH LONG-TERM CURRENT USE OF INSULIN (HCC): ICD-10-CM

## 2020-08-28 DIAGNOSIS — R78.81 STAPHYLOCOCCUS AUREUS BACTEREMIA: ICD-10-CM

## 2020-08-28 DIAGNOSIS — C73 THYROID CANCER (HCC): ICD-10-CM

## 2020-08-28 DIAGNOSIS — E05.90 HYPERTHYROIDISM: ICD-10-CM

## 2020-08-28 DIAGNOSIS — M86.9 OSTEOMYELITIS (HCC): ICD-10-CM

## 2020-08-28 DIAGNOSIS — L03.115 CELLULITIS OF RIGHT FOOT: Primary | ICD-10-CM

## 2020-08-28 DIAGNOSIS — N17.9 ACUTE KIDNEY INJURY (HCC): ICD-10-CM

## 2020-08-28 DIAGNOSIS — R33.9 URINARY RETENTION: ICD-10-CM

## 2020-08-28 DIAGNOSIS — B95.61 STAPHYLOCOCCUS AUREUS BACTEREMIA: ICD-10-CM

## 2020-08-28 PROBLEM — T14.8XXA WOUND INFECTION: Status: ACTIVE | Noted: 2020-08-28

## 2020-08-28 PROBLEM — L08.9 WOUND INFECTION: Status: ACTIVE | Noted: 2020-08-28

## 2020-08-28 LAB
ALBUMIN SERPL BCP-MCNC: 3 G/DL (ref 3.5–5)
ALP SERPL-CCNC: 97 U/L (ref 46–116)
ALT SERPL W P-5'-P-CCNC: 44 U/L (ref 12–78)
ANION GAP SERPL CALCULATED.3IONS-SCNC: 11 MMOL/L (ref 4–13)
AST SERPL W P-5'-P-CCNC: 50 U/L (ref 5–45)
BASOPHILS # BLD AUTO: 0.06 THOUSANDS/ΜL (ref 0–0.1)
BASOPHILS NFR BLD AUTO: 1 % (ref 0–1)
BILIRUB SERPL-MCNC: 0.5 MG/DL (ref 0.2–1)
BUN SERPL-MCNC: 19 MG/DL (ref 5–25)
CALCIUM SERPL-MCNC: 9.1 MG/DL (ref 8.3–10.1)
CHLORIDE SERPL-SCNC: 96 MMOL/L (ref 100–108)
CO2 SERPL-SCNC: 26 MMOL/L (ref 21–32)
CREAT SERPL-MCNC: 0.94 MG/DL (ref 0.6–1.3)
EOSINOPHIL # BLD AUTO: 0.25 THOUSAND/ΜL (ref 0–0.61)
EOSINOPHIL NFR BLD AUTO: 3 % (ref 0–6)
ERYTHROCYTE [DISTWIDTH] IN BLOOD BY AUTOMATED COUNT: 11.8 % (ref 11.6–15.1)
EST. AVERAGE GLUCOSE BLD GHB EST-MCNC: 186 MG/DL
GFR SERPL CREATININE-BSD FRML MDRD: 90 ML/MIN/1.73SQ M
GLUCOSE SERPL-MCNC: 126 MG/DL (ref 65–140)
GLUCOSE SERPL-MCNC: 171 MG/DL (ref 65–140)
GLUCOSE SERPL-MCNC: 182 MG/DL (ref 65–140)
HBA1C MFR BLD: 8.1 %
HCT VFR BLD AUTO: 36.4 % (ref 36.5–49.3)
HGB BLD-MCNC: 12.3 G/DL (ref 12–17)
IMM GRANULOCYTES # BLD AUTO: 0.04 THOUSAND/UL (ref 0–0.2)
IMM GRANULOCYTES NFR BLD AUTO: 0 % (ref 0–2)
LACTATE SERPL-SCNC: 1.3 MMOL/L (ref 0.5–2)
LACTATE SERPL-SCNC: 2.2 MMOL/L (ref 0.5–2)
LYMPHOCYTES # BLD AUTO: 2.32 THOUSANDS/ΜL (ref 0.6–4.47)
LYMPHOCYTES NFR BLD AUTO: 26 % (ref 14–44)
MCH RBC QN AUTO: 30.3 PG (ref 26.8–34.3)
MCHC RBC AUTO-ENTMCNC: 33.8 G/DL (ref 31.4–37.4)
MCV RBC AUTO: 90 FL (ref 82–98)
MONOCYTES # BLD AUTO: 1.1 THOUSAND/ΜL (ref 0.17–1.22)
MONOCYTES NFR BLD AUTO: 12 % (ref 4–12)
NEUTROPHILS # BLD AUTO: 5.16 THOUSANDS/ΜL (ref 1.85–7.62)
NEUTS SEG NFR BLD AUTO: 58 % (ref 43–75)
NRBC BLD AUTO-RTO: 0 /100 WBCS
PLATELET # BLD AUTO: 366 THOUSANDS/UL (ref 149–390)
PMV BLD AUTO: 10.4 FL (ref 8.9–12.7)
POTASSIUM SERPL-SCNC: 4.2 MMOL/L (ref 3.5–5.3)
PROT SERPL-MCNC: 8.4 G/DL (ref 6.4–8.2)
RBC # BLD AUTO: 4.06 MILLION/UL (ref 3.88–5.62)
SODIUM SERPL-SCNC: 133 MMOL/L (ref 136–145)
T4 FREE SERPL-MCNC: 1.39 NG/DL (ref 0.76–1.46)
TSH SERPL DL<=0.05 MIU/L-ACNC: 0.08 UIU/ML (ref 0.36–3.74)
WBC # BLD AUTO: 8.93 THOUSAND/UL (ref 4.31–10.16)

## 2020-08-28 PROCEDURE — 83605 ASSAY OF LACTIC ACID: CPT | Performed by: STUDENT IN AN ORGANIZED HEALTH CARE EDUCATION/TRAINING PROGRAM

## 2020-08-28 PROCEDURE — 99223 1ST HOSP IP/OBS HIGH 75: CPT | Performed by: STUDENT IN AN ORGANIZED HEALTH CARE EDUCATION/TRAINING PROGRAM

## 2020-08-28 PROCEDURE — 83605 ASSAY OF LACTIC ACID: CPT | Performed by: PHYSICIAN ASSISTANT

## 2020-08-28 PROCEDURE — 84439 ASSAY OF FREE THYROXINE: CPT | Performed by: PHYSICIAN ASSISTANT

## 2020-08-28 PROCEDURE — 99285 EMERGENCY DEPT VISIT HI MDM: CPT | Performed by: PHYSICIAN ASSISTANT

## 2020-08-28 PROCEDURE — 73720 MRI LWR EXTREMITY W/O&W/DYE: CPT

## 2020-08-28 PROCEDURE — 3052F HG A1C>EQUAL 8.0%<EQUAL 9.0%: CPT | Performed by: FAMILY MEDICINE

## 2020-08-28 PROCEDURE — 73630 X-RAY EXAM OF FOOT: CPT

## 2020-08-28 PROCEDURE — 85025 COMPLETE CBC W/AUTO DIFF WBC: CPT | Performed by: PHYSICIAN ASSISTANT

## 2020-08-28 PROCEDURE — 80053 COMPREHEN METABOLIC PANEL: CPT | Performed by: PHYSICIAN ASSISTANT

## 2020-08-28 PROCEDURE — 87040 BLOOD CULTURE FOR BACTERIA: CPT | Performed by: PHYSICIAN ASSISTANT

## 2020-08-28 PROCEDURE — 84443 ASSAY THYROID STIM HORMONE: CPT | Performed by: PHYSICIAN ASSISTANT

## 2020-08-28 PROCEDURE — G1004 CDSM NDSC: HCPCS

## 2020-08-28 PROCEDURE — 82948 REAGENT STRIP/BLOOD GLUCOSE: CPT

## 2020-08-28 PROCEDURE — 87147 CULTURE TYPE IMMUNOLOGIC: CPT | Performed by: PHYSICIAN ASSISTANT

## 2020-08-28 PROCEDURE — 36415 COLL VENOUS BLD VENIPUNCTURE: CPT | Performed by: PHYSICIAN ASSISTANT

## 2020-08-28 PROCEDURE — 87186 SC STD MICRODIL/AGAR DIL: CPT | Performed by: PHYSICIAN ASSISTANT

## 2020-08-28 PROCEDURE — 83036 HEMOGLOBIN GLYCOSYLATED A1C: CPT | Performed by: PODIATRIST

## 2020-08-28 PROCEDURE — A9585 GADOBUTROL INJECTION: HCPCS | Performed by: FAMILY MEDICINE

## 2020-08-28 PROCEDURE — 99285 EMERGENCY DEPT VISIT HI MDM: CPT

## 2020-08-28 RX ORDER — SODIUM CHLORIDE 9 MG/ML
100 INJECTION, SOLUTION INTRAVENOUS CONTINUOUS
Status: DISCONTINUED | OUTPATIENT
Start: 2020-08-28 | End: 2020-09-10

## 2020-08-28 RX ORDER — ZOLPIDEM TARTRATE 5 MG/1
10 TABLET ORAL
Status: DISCONTINUED | OUTPATIENT
Start: 2020-08-28 | End: 2020-09-21 | Stop reason: HOSPADM

## 2020-08-28 RX ORDER — INSULIN GLARGINE 100 [IU]/ML
80 INJECTION, SOLUTION SUBCUTANEOUS
Status: DISCONTINUED | OUTPATIENT
Start: 2020-08-28 | End: 2020-08-29

## 2020-08-28 RX ORDER — MAGNESIUM HYDROXIDE/ALUMINUM HYDROXICE/SIMETHICONE 120; 1200; 1200 MG/30ML; MG/30ML; MG/30ML
30 SUSPENSION ORAL EVERY 6 HOURS PRN
Status: DISCONTINUED | OUTPATIENT
Start: 2020-08-28 | End: 2020-09-21 | Stop reason: HOSPADM

## 2020-08-28 RX ORDER — LORAZEPAM 0.5 MG/1
0.5 TABLET ORAL 2 TIMES DAILY PRN
Status: DISCONTINUED | OUTPATIENT
Start: 2020-08-28 | End: 2020-09-21 | Stop reason: HOSPADM

## 2020-08-28 RX ORDER — ONDANSETRON 2 MG/ML
4 INJECTION INTRAMUSCULAR; INTRAVENOUS EVERY 6 HOURS PRN
Status: DISCONTINUED | OUTPATIENT
Start: 2020-08-28 | End: 2020-09-21 | Stop reason: HOSPADM

## 2020-08-28 RX ORDER — METHIMAZOLE 5 MG/1
5 TABLET ORAL DAILY
Status: DISCONTINUED | OUTPATIENT
Start: 2020-08-29 | End: 2020-09-21 | Stop reason: HOSPADM

## 2020-08-28 RX ORDER — ASPIRIN 81 MG/1
81 TABLET, CHEWABLE ORAL DAILY
Status: DISCONTINUED | OUTPATIENT
Start: 2020-08-29 | End: 2020-09-21 | Stop reason: HOSPADM

## 2020-08-28 RX ORDER — ATORVASTATIN CALCIUM 40 MG/1
40 TABLET, FILM COATED ORAL
Status: DISCONTINUED | OUTPATIENT
Start: 2020-08-28 | End: 2020-09-21 | Stop reason: HOSPADM

## 2020-08-28 RX ORDER — ACETAMINOPHEN 325 MG/1
650 TABLET ORAL EVERY 6 HOURS PRN
Status: DISCONTINUED | OUTPATIENT
Start: 2020-08-28 | End: 2020-09-03

## 2020-08-28 RX ORDER — PANTOPRAZOLE SODIUM 20 MG/1
20 TABLET, DELAYED RELEASE ORAL
Status: DISCONTINUED | OUTPATIENT
Start: 2020-08-29 | End: 2020-09-21 | Stop reason: HOSPADM

## 2020-08-28 RX ORDER — CLOPIDOGREL BISULFATE 75 MG/1
75 TABLET ORAL DAILY
Status: DISCONTINUED | OUTPATIENT
Start: 2020-08-29 | End: 2020-09-21 | Stop reason: HOSPADM

## 2020-08-28 RX ORDER — POLYETHYLENE GLYCOL 3350 17 G/17G
17 POWDER, FOR SOLUTION ORAL DAILY PRN
Status: DISCONTINUED | OUTPATIENT
Start: 2020-08-28 | End: 2020-09-21 | Stop reason: HOSPADM

## 2020-08-28 RX ADMIN — VANCOMYCIN HYDROCHLORIDE 1500 MG: 5 INJECTION, POWDER, LYOPHILIZED, FOR SOLUTION INTRAVENOUS at 16:18

## 2020-08-28 RX ADMIN — INSULIN LISPRO 1 UNITS: 100 INJECTION, SOLUTION INTRAVENOUS; SUBCUTANEOUS at 18:36

## 2020-08-28 RX ADMIN — PIPERACILLIN AND TAZOBACTAM 3.38 G: 3; .375 INJECTION, POWDER, LYOPHILIZED, FOR SOLUTION INTRAVENOUS at 16:01

## 2020-08-28 RX ADMIN — CEFEPIME HYDROCHLORIDE 2000 MG: 2 INJECTION, POWDER, FOR SOLUTION INTRAVENOUS at 18:37

## 2020-08-28 RX ADMIN — GADOBUTROL 11 ML: 604.72 INJECTION INTRAVENOUS at 19:23

## 2020-08-28 RX ADMIN — SODIUM CHLORIDE 100 ML/HR: 0.9 INJECTION, SOLUTION INTRAVENOUS at 18:37

## 2020-08-28 RX ADMIN — INSULIN LISPRO 30 UNITS: 100 INJECTION, SOLUTION INTRAVENOUS; SUBCUTANEOUS at 18:38

## 2020-08-28 RX ADMIN — ATORVASTATIN CALCIUM 40 MG: 40 TABLET, FILM COATED ORAL at 18:36

## 2020-08-28 NOTE — ASSESSMENT & PLAN NOTE
· Diagnosed with thyroid cancer in July     · Continue methimazole  · TSH was still low last check 7/2020  · Recheck TSH

## 2020-08-28 NOTE — ED NOTES
Pt's wound an oval about 3 inches long and 1 5 inches wide  Dark green thick purulent discharge, foul smell  Pt states it was cultured at last visit to MD and is MRSA positive  Pt denies pain at site  Presents with post-Op shoe on appropriate foot       Jez BONI Acevedo  08/28/20 0831

## 2020-08-28 NOTE — ASSESSMENT & PLAN NOTE
· Presents from podiatry office due to concerns for ongoing wound infection, following amputation right great toe for gangrene earlier this month  · Consult podiatry - Dr Margaret Chery requesting XR and MRI of the foot, likely back to OR Monday or Tuesday  · Consult vascular surgery - VAS studies earlier this month showed healing range, however his wound is nonhealing   · Consult ID - multiple organisms isolated from wound earlier this month (enterobacter, morganella, citrobacter, and now MRSA)  Previously on Cefepime/Flagyl, then Bactrim/Flagyl during last admission; most recently on Keflex then Doxycycline outpatient without improvement    · Continue Vanco, pharmacy consulted for management  · Continue cefepime, high dosing as per previous admission  · Check blood cultures, lactic, CBC with differential, CMP  · NONWEIGHT BEARING RLE  · Daily dressings - Maxorb + DSD  · PT/OT consults

## 2020-08-28 NOTE — ED PROVIDER NOTES
History  Chief Complaint   Patient presents with    Post-Op Infection     Pt has infection on Right great toe  Saw MD, states he is here for admission and reoperation on Monday  61 yo male here with right great toe pain  Seen earlier this month for gangrene of right great toe  He was admitted and had amputation performed  Over the past week he has noticed new foul odor and drainage  Saw his podiatrist today in office who sent him directly to ER for IV abx and admission as well as likely surgery on Monday  He has had chills since yesterday but no recorded fever  Denies n/v  No new numbness, tingling weakness  History provided by:  Patient   used: No    Leg Pain   Location:  Foot  Time since incident:  1 week  Injury: no    Foot location:  R foot  Pain details:     Quality:  Aching    Radiates to:  Does not radiate    Severity:  Moderate    Onset quality:  Gradual    Duration:  1 week    Timing:  Constant    Progression:  Worsening  Chronicity:  Recurrent  Dislocation: no    Foreign body present:  No foreign bodies  Tetanus status:  Up to date  Prior injury to area:  No  Relieved by:  Nothing  Worsened by:  Nothing  Ineffective treatments:  None tried  Associated symptoms: no back pain, no decreased ROM, no fatigue, no fever, no itching, no muscle weakness, no neck pain, no numbness, no stiffness, no swelling and no tingling        Prior to Admission Medications   Prescriptions Last Dose Informant Patient Reported? Taking?    AVALIDE 300-12 5 MG per tablet 8/27/2020 at Unknown time  No Yes   Sig: Take 1 tablet by mouth daily   BD PEN NEEDLE DEV U/F 32G X 4 MM MISC   No No   Sig: Inject under the skin 4 (four) times a day   Blood Glucose Monitoring Suppl (ONE TOUCH ULTRA 2) w/Device KIT  Self No No   Sig: by Does not apply route 2 (two) times a day   aspirin 81 mg chewable tablet 8/27/2020 at Unknown time  No Yes   Sig: Chew 1 tablet (81 mg total) daily   atorvastatin (LIPITOR) 40 mg tablet 2020 at Unknown time  No Yes   Sig: Take 1 tablet (40 mg total) by mouth daily with dinner   clopidogrel (PLAVIX) 75 mg tablet 2020 at Unknown time  No Yes   Sig: Take 1 tablet (75 mg total) by mouth daily   glucose blood test strip   No No   Sig: Check blood sugar daily   insulin glargine (Lantus SoloStar) 100 units/mL injection pen 2020 at Unknown time  No Yes   Si u bedtime   insulin glulisine (Apidra SoloStar) 100 units/mL injection pen 2020 at Unknown time  No Yes   Sig: Inject 40 Units under the skin 3 (three) times a day with meals   methimazole (TAPAZOLE) 5 mg tablet 2020 at Unknown time  Yes Yes   Sig: take 1 tablet by mouth once daily (STOP MEDICATION AND CALL DOCTO     (REFER TO PRESCRIPTION NOTES)  omeprazole (PriLOSEC) 40 MG capsule 2020 at Unknown time  No Yes   Sig: TAKE 1 CAPSULE DAILY   verapamil (VERELAN PM) 360 MG 24 hr capsule 2020 at Unknown time Self Yes Yes   Sig: Take 360 mg by mouth daily    zolpidem (AMBIEN) 10 mg tablet Past Month at Unknown time  No Yes   Sig: Take 1 tablet (10 mg total) by mouth daily at bedtime as needed for sleep      Facility-Administered Medications: None       Past Medical History:   Diagnosis Date    Diabetes mellitus ([address] )     Hyperlipidemia     Hypertension     Thyroid cancer [facility])        Past Surgical History:   Procedure Laterality Date    FOOT SURGERY Right     IR AORTAGRAM WITH RUN-OFF  2020    TOE AMPUTATION Right 2020    Procedure: AMPUTATION TOE;  Surgeon: Ania Read DPM;  Location: [facility] OR;  Service: [address]  THYROID BIOPSY  2020       Family History   Problem Relation Age of Onset    Cancer Mother     Hypertension Father      I have reviewed and agree with the history as documented      E-Cigarette/Vaping    E-Cigarette Use Never User      E-Cigarette/Vaping Substances     Social History     Tobacco Use    Smoking status: Never Smoker    Smokeless tobacco: Never Used   Substance Use Topics    Alcohol use: Not Currently     Alcohol/week: 8 0 standard drinks     Types: 8 Cans of beer per week     Comment: last drink was 10 days ago    Drug use: No     Types: Marijuana       Review of Systems   Constitutional: Negative for activity change, appetite change, chills, diaphoresis, fatigue, fever and unexpected weight change  HENT: Negative for congestion, rhinorrhea, sinus pressure, sore throat and trouble swallowing  Eyes: Negative for photophobia and visual disturbance  Respiratory: Negative for apnea, cough, choking, chest tightness, shortness of breath, wheezing and stridor  Cardiovascular: Negative for chest pain, palpitations and leg swelling  Gastrointestinal: Negative for abdominal distention, abdominal pain, blood in stool, constipation, diarrhea, nausea and vomiting  Genitourinary: Negative for decreased urine volume, difficulty urinating, dysuria, enuresis, flank pain, frequency, hematuria and urgency  Musculoskeletal: Negative for arthralgias, back pain, myalgias, neck pain, neck stiffness and stiffness  Skin: Positive for wound  Negative for color change, itching, pallor and rash  Allergic/Immunologic: Negative  Neurological: Negative for dizziness, tremors, syncope, weakness, light-headedness, numbness and headaches  Hematological: Negative  Psychiatric/Behavioral: Negative  All other systems reviewed and are negative  Physical Exam  Physical Exam  Vitals signs and nursing note reviewed  Constitutional:       General: He is not in acute distress  Appearance: Normal appearance  He is well-developed  He is not ill-appearing, toxic-appearing or diaphoretic  HENT:      Head: Normocephalic and atraumatic  Eyes:      General: Lids are normal       Pupils: Pupils are equal, round, and reactive to light  Neck:      Musculoskeletal: Normal range of motion and neck supple     Cardiovascular:      Rate and Rhythm: Normal rate and regular rhythm  Pulses: Normal pulses  No decreased pulses  Radial pulses are 2+ on the right side and 2+ on the left side  Heart sounds: Normal heart sounds, S1 normal and S2 normal  Heart sounds not distant  No murmur  No friction rub  No gallop  Pulmonary:      Effort: Pulmonary effort is normal  No tachypnea, bradypnea, accessory muscle usage or respiratory distress  Breath sounds: Normal breath sounds  No decreased breath sounds, wheezing, rhonchi or rales  Abdominal:      General: There is no distension  Palpations: Abdomen is soft  Abdomen is not rigid  Tenderness: There is no abdominal tenderness  There is no guarding or rebound  Musculoskeletal: Normal range of motion  General: No deformity  Right foot: Normal range of motion and normal capillary refill  Tenderness, bony tenderness and swelling present  No crepitus or deformity  Feet:    Skin:     General: Skin is warm and dry  Coloration: Skin is not pale  Findings: No erythema or rash  Neurological:      Mental Status: He is alert and oriented to person, place, and time  GCS: GCS eye subscore is 4  GCS verbal subscore is 5  GCS motor subscore is 6  Cranial Nerves: No cranial nerve deficit     Psychiatric:         Speech: Speech normal          Vital Signs  ED Triage Vitals [08/28/20 1525]   Temperature Pulse Respirations Blood Pressure SpO2   98 4 °F (36 9 °C) 86 19 133/97 98 %      Temp Source Heart Rate Source Patient Position - Orthostatic VS BP Location FiO2 (%)   Oral Monitor Sitting Right arm --      Pain Score       --           Vitals:    08/28/20 1525 08/28/20 1600   BP: 133/97 133/97   Pulse: 86 87   Patient Position - Orthostatic VS: Sitting          Visual Acuity      ED Medications  Medications   vancomycin (VANCOCIN) 1,500 mg in sodium chloride 0 9 % 250 mL IVPB (1,500 mg Intravenous New Bag 8/28/20 1618)   pantoprazole (PROTONIX) EC tablet 20 mg (has no administration in time range)   insulin lispro (HumaLOG) 100 units/mL subcutaneous injection 30 Units (has no administration in time range)   irbesartan-hydrochlorothiazide (AVALIDE 300/12  5) combo dose (has no administration in time range)   insulin glargine (LANTUS) subcutaneous injection 80 Units 0 8 mL (has no administration in time range)   clopidogrel (PLAVIX) tablet 75 mg (has no administration in time range)   atorvastatin (LIPITOR) tablet 40 mg (has no administration in time range)   aspirin chewable tablet 81 mg (has no administration in time range)   methimazole (TAPAZOLE) tablet 5 mg (has no administration in time range)   zolpidem (AMBIEN) tablet 10 mg (has no administration in time range)   polyethylene glycol (MIRALAX) packet 17 g (has no administration in time range)   ondansetron (ZOFRAN) injection 4 mg (has no administration in time range)   aluminum-magnesium hydroxide-simethicone (MYLANTA) 200-200-20 mg/5 mL oral suspension 30 mL (has no administration in time range)   acetaminophen (TYLENOL) tablet 650 mg (has no administration in time range)   insulin lispro (HumaLOG) 100 units/mL subcutaneous injection 1-5 Units (has no administration in time range)   vancomycin (VANCOCIN) 2,000 mg in sodium chloride 0 9 % 500 mL IVPB (has no administration in time range)   verapamil (CALAN-SR) CR tablet 240 mg (has no administration in time range)   LORazepam (ATIVAN) tablet 0 5 mg (has no administration in time range)   cefepime (MAXIPIME) 2,000 mg in dextrose 5 % 50 mL IVPB (has no administration in time range)   sodium chloride 0 9 % infusion (has no administration in time range)   piperacillin-tazobactam (ZOSYN) 3 375 g in sodium chloride 0 9 % 100 mL IVPB (0 g Intravenous Stopped 8/28/20 1618)       Diagnostic Studies  Results Reviewed     Procedure Component Value Units Date/Time    TSH, 3rd generation with Free T4 reflex [[id number]]  (Abnormal) Collected:  08/28/20 3883    Lab Status:  Final result Specimen:  Blood from Arm, Right Updated:  08/28/20 1658     TSH 3RD GENERATON 0 080 uIU/mL     Narrative:       Patients undergoing fluorescein dye angiography may retain small amounts of fluorescein in the body for 48-72 hours post procedure  Samples containing fluorescein can produce falsely depressed TSH values  If the patient had this procedure,a specimen should be resubmitted post fluorescein clearance  T4, free [[id number]] Collected:  08/28/20 1558    Lab Status: In process Specimen:  Blood from Arm, Right Updated:  08/28/20 1657    Lactic acid [[id number]]  (Abnormal) Collected:  08/28/20 1558    Lab Status:  Final result Specimen:  Blood from Arm, Right Updated:  08/28/20 1639     LACTIC ACID 2 2 mmol/L     Narrative:       Result may be elevated if tourniquet was used during collection      Comprehensive metabolic panel [[id number]]  (Abnormal) Collected:  08/28/20 1558    Lab Status:  Final result Specimen:  Blood from Arm, Right Updated:  08/28/20 1637     Sodium 133 mmol/L      Potassium 4 2 mmol/L      Chloride 96 mmol/L      CO2 26 mmol/L      ANION GAP 11 mmol/L      BUN 19 mg/dL      Creatinine 0 94 mg/dL      Glucose 182 mg/dL      Calcium 9 1 mg/dL      AST 50 U/L      ALT 44 U/L      Alkaline Phosphatase 97 U/L      Total Protein 8 4 g/dL      Albumin 3 0 g/dL      Total Bilirubin 0 50 mg/dL      eGFR 90 ml/min/1 73sq m     Narrative:       [place] guidelines for Chronic Kidney Disease (CKD):     Stage 1 with normal or high GFR (GFR > 90 mL/min/1 73 square meters)    Stage 2 Mild CKD (GFR = 60-89 mL/min/1 73 square meters)    Stage 3A Moderate CKD (GFR = 45-59 mL/min/1 73 square meters)    Stage 3B Moderate CKD (GFR = 30-44 mL/min/1 73 square meters)    Stage 4 Severe CKD (GFR = 15-29 mL/min/1 73 square meters)    Stage 5 End Stage CKD (GFR <15 mL/min/1 73 square meters)  Note: GFR calculation is accurate only with a steady state creatinine    Hemoglobin A1C [[id number]] Collected:  08/28/20 1558    Lab Status: In process Specimen:  Blood from Arm, Right Updated:  08/28/20 1635    CBC and differential [[id number]]  (Abnormal) Collected:  08/28/20 1558    Lab Status:  Final result Specimen:  Blood from Arm, Right Updated:  08/28/20 1608     WBC 8 93 Thousand/uL      RBC 4 06 Million/uL      Hemoglobin 12 3 g/dL      Hematocrit 36 4 %      MCV 90 fL      MCH 30 3 pg      MCHC 33 8 g/dL      RDW 11 8 %      MPV 10 4 fL      Platelets 701 Thousands/uL      nRBC 0 /100 WBCs      Neutrophils Relative 58 %      Immat GRANS % 0 %      Lymphocytes Relative 26 %      Monocytes Relative 12 %      Eosinophils Relative 3 %      Basophils Relative 1 %      Neutrophils Absolute 5 16 Thousands/µL      Immature Grans Absolute 0 04 Thousand/uL      Lymphocytes Absolute 2 32 Thousands/µL      Monocytes Absolute 1 10 Thousand/µL      Eosinophils Absolute 0 25 Thousand/µL      Basophils Absolute 0 06 Thousands/µL     Blood culture #1 [[id number]] Collected:  08/28/20 1558    Lab Status: In process Specimen:  Blood from Arm, Left Updated:  08/28/20 1602    Blood culture #2 [[id number]] Collected:  08/28/20 1558    Lab Status: In process Specimen:  Blood from Arm, Right Updated:  08/28/20 1602                 MRI inpatient order    (Results Pending)   XR foot 3+ vw right    (Results Pending)              Procedures  Procedures         ED Course                                             MDM  Number of Diagnoses or Management Options  Cellulitis of right foot: new and requires workup  Diagnosis management comments: ddx includes but is not limited to cellulitis, sepsis, osteo  Plan: IV abx  Admission for podiatry/surgical eval  Pt agrees with plan  Stable at the time of admission          Amount and/or Complexity of Data Reviewed  Clinical lab tests: reviewed and ordered    Risk of Complications, Morbidity, and/or Mortality  Presenting problems: moderate  Management options: low  General comments: 61 yo male with right foot pain  Cellulitis present  Will admit for IV abx and eval by podiatry  Pt agrees with plan  Stable at the time of admission  Patient Progress  Patient progress: stable        Disposition  Final diagnoses:   Cellulitis of right foot     Time reflects when diagnosis was documented in both MDM as applicable and the Disposition within this note     Time User Action Codes Description Comment    8/28/2020  3:33 PM Demetris Durham [L03 [id number]] Cellulitis of right foot       ED Disposition     ED Disposition Condition Date/Time Comment    Admit Stable Fri Aug 28, 2020  3:33 PM Case was discussed with Dr Gilford Argue and the patient's admission status was agreed to be Admission Status: inpatient status to the service of Dr Melissa Sam           Follow-up Information    None         Current Discharge Medication List      CONTINUE these medications which have NOT CHANGED    Details   aspirin 81 mg chewable tablet Chew 1 tablet (81 mg total) daily  Qty: 30 tablet, Refills: 0    Associated Diagnoses: History of nonadherence to medical treatment      atorvastatin (LIPITOR) 40 mg tablet Take 1 tablet (40 mg total) by mouth daily with dinner  Qty: 30 tablet, Refills: 0    Associated Diagnoses: History of nonadherence to medical treatment      AVALIDE 300-12 5 MG per tablet Take 1 tablet by mouth daily  Qty: 90 tablet, Refills: 3    Associated Diagnoses: Hypertension, essential      clopidogrel (PLAVIX) 75 mg tablet Take 1 tablet (75 mg total) by mouth daily  Qty: 30 tablet, Refills: 0    Associated Diagnoses: History of nonadherence to medical treatment      insulin glargine (Lantus SoloStar) 100 units/mL injection pen 80 u bedtime  Qty: 9 pen, Refills: 5    Associated Diagnoses: Type 2 diabetes mellitus with diabetic polyneuropathy, with long-term current use of insulin (HCC)      insulin glulisine (Apidra SoloStar) 100 units/mL injection pen Inject 40 Units under the skin 3 (three) times a day with meals  Qty: 5 pen, Refills: 6    Associated Diagnoses: Type 2 diabetes mellitus with diabetic polyneuropathy, with long-term current use of insulin ([facility])      methimazole (TAPAZOLE) 5 mg tablet take 1 tablet by mouth once daily (STOP MEDICATION AND CALL DOCTO     (REFER TO PRESCRIPTION NOTES)  omeprazole (PriLOSEC) 40 MG capsule TAKE 1 CAPSULE DAILY  Qty: 90 capsule, Refills: 3    Associated Diagnoses: Gastroesophageal reflux disease without esophagitis      verapamil (VERELAN PM) 360 MG 24 hr capsule Take 360 mg by mouth daily       zolpidem (AMBIEN) 10 mg tablet Take 1 tablet (10 mg total) by mouth daily at bedtime as needed for sleep  Qty: 30 tablet, Refills: 1    Associated Diagnoses: Insomnia, unspecified type      BD PEN NEEDLE DEV U/F 32G X 4 MM MISC Inject under the skin 4 (four) times a day  Qty: 400 each, Refills: 1    Associated Diagnoses: Type 2 diabetes mellitus with hyperglycemia, with long-term current use of insulin ([facility])      Blood Glucose Monitoring Suppl (ONE TOUCH ULTRA 2) w/Device KIT by Does not apply route 2 (two) times a day  Qty: 1 each, Refills: 0    Associated Diagnoses: Diabetes mellitus with proteinuria ([facility])      glucose blood test strip Check blood sugar daily  Qty: 100 each, Refills: 3    Comments: One touch ultra test strips  Associated Diagnoses: Type 2 diabetes mellitus with diabetic polyneuropathy, with long-term current use of insulin ([facility])           No discharge procedures on file      PDMP Review     None          ED Provider  Electronically Signed by           Vikki Cole PA-C  08/28/20 4012

## 2020-08-28 NOTE — ED NOTES
1 CC: infection in right foot wound  Sent by podiatrist  +MRSA in right great toe amputation wound  2  Orientation status:A&OX4  3  Abnormal labs/vitals/focused assessment: labs not back yet  4  Medication/drips: IV abx  5  Narcotic time:none  6  IV lines/drains/etc : 20g RFA  7  Isolation status: +MRSA  8  Skin: dressing to right foot, right great toe amputated about 3 weeks ago  9  Ambulation status: self  10   ED phone number: call ER charge RN with questions       Madelyn Swift RN  08/28/20 0429

## 2020-08-28 NOTE — CONSULTS
Consultation - Carolina Weaver 60 y o  male MRN: [id number]    Unit/Bed#: ED 18 Encounter: [id number]      Assessment/Plan     Assessment:  1) Osteomyelitis to the right foot  2) Nonheling incision site  3) Diabetes  4) PAD   Plan:  1) Admit to hospital  2) IV antibiotics as per Medicine and ID  3) ID consult, Vascular Surgery Consult   4) MRI of the right foot  5) Plan for OR intervention and further resection of the first ray right foot  6) Nonweightbearing right foot  7) Dress with maxsorb and DSD   8) Will follow     History of Present Illness     HPI: Carolina Weaver is a 60y o  year old male who presents with nonhealing ulcer to the right foot secondary to infection and dehiscence with poor healing  Patient was seen by visiting nurses on  Wednesday when the wound was noted to be pink and granular, however upon evaluation in the office today, there was exposed bone and malodor with eschar        Consults  Vascular Surgery   Infectious Disease     Review of Systems   Patient denies N/V/F/C/S    Historical Information   Past Medical History:   Diagnosis Date    Diabetes mellitus [address] [address] )     Hyperlipidemia     Hypertension     Thyroid cancer [facility])      Past Surgical History:   Procedure Laterality Date    FOOT SURGERY Right 2014    IR AORTAGRAM WITH RUN-OFF  8/6/2020    TOE AMPUTATION Right 8/11/2020    Procedure: AMPUTATION TOE;  Surgeon: Chu Freeman DPM;  Location: [facility] OR;  Service: [address] [place] [address] THYROID BIOPSY  7/2/2020     Social History   Social History     Substance and Sexual Activity   Alcohol Use Not Currently    Alcohol/week: 8 0 standard drinks    Types: 8 Cans of beer per week    Comment: last drink was 10 days ago     Social History     Substance and Sexual Activity   Drug Use No    Types: Marijuana     Social History     Tobacco Use   Smoking Status Never Smoker   Smokeless Tobacco Never Used     E-Cigarette/Vaping    E-Cigarette Use Never User      E-Cigarette/Vaping Substances      Family History:   Family History   Problem Relation Age of Onset   Ardeth Needs Cancer Mother     Hypertension Father        Meds/Allergies   all current active meds have been reviewed, current meds:   Current Facility-Administered Medications   Medication Dose Route Frequency    piperacillin-tazobactam (ZOSYN) 3 375 g in sodium chloride 0 9 % 100 mL IVPB  3 375 g Intravenous Once    vancomycin (VANCOCIN) 1,500 mg in sodium chloride 0 9 % 250 mL IVPB  15 mg/kg (Adjusted) Intravenous Once    and PTA meds:   Prior to Admission Medications   Prescriptions Last Dose Informant Patient Reported? Taking? AVALIDE 300-12 5 MG per tablet 2020 at Unknown time  No Yes   Sig: Take 1 tablet by mouth daily   BD PEN NEEDLE DEV U/F 32G X 4 MM MISC   No No   Sig: Inject under the skin 4 (four) times a day   Blood Glucose Monitoring Suppl (ONE TOUCH ULTRA 2) w/Device KIT  Self No No   Sig: by Does not apply route 2 (two) times a day   aspirin 81 mg chewable tablet 2020 at Unknown time  No Yes   Sig: Chew 1 tablet (81 mg total) daily   atorvastatin (LIPITOR) 40 mg tablet 2020 at Unknown time  No Yes   Sig: Take 1 tablet (40 mg total) by mouth daily with dinner   clopidogrel (PLAVIX) 75 mg tablet 2020 at Unknown time  No Yes   Sig: Take 1 tablet (75 mg total) by mouth daily   glucose blood test strip   No No   Sig: Check blood sugar daily   insulin glargine (Lantus SoloStar) 100 units/mL injection pen 2020 at Unknown time  No Yes   Si u bedtime   insulin glulisine (Apidra SoloStar) 100 units/mL injection pen 2020 at Unknown time  No Yes   Sig: Inject 40 Units under the skin 3 (three) times a day with meals   methimazole (TAPAZOLE) 5 mg tablet 2020 at Unknown time  Yes Yes   Sig: take 1 tablet by mouth once daily (STOP MEDICATION AND CALL DOCTO     (REFER TO PRESCRIPTION NOTES)     omeprazole (PriLOSEC) 40 MG capsule 2020 at Unknown time  No Yes   Sig: TAKE 1 CAPSULE DAILY verapamil (VERELAN PM) 360 MG 24 hr capsule 8/27/2020 at Unknown time Self Yes Yes   Sig: Take 360 mg by mouth daily    zolpidem (AMBIEN) 10 mg tablet Past Month at Unknown time  No Yes   Sig: Take 1 tablet (10 mg total) by mouth daily at bedtime as needed for sleep      Facility-Administered Medications: None     No Known Allergies    Objective   Vitals: Blood pressure 133/97, pulse 86, temperature 98 4 °F (36 9 °C), temperature source Oral, resp  rate 19, SpO2 98 %    No intake or output data in the 24 hours ending 08/28/20 1552  Invasive Devices     Peripheral Intravenous Line            Peripheral IV 08/12/20 Left;Ventral (anterior) Arm 16 days                Physical Exam  Patient is awake and oriented  Vascular: pulses are nonpalpable, no pedal hair, thin shiny skin  Orthpedic: Previous resection of the fifth ray right foot, previous amputation of the right foot first digit  Neurological: no POP, no sensation, no babinski  Dermatological: Open lesion at the great toe amputation site, 3 cm x 6 cm x 0 5 cm, probes to bone, eschar and slough, cellulitis to the midfoot     CULTURE TAKEN IN OFFICE SHOWS MRSA sensitive to Vancomycin and Tetracycline, failed outpatient doxycycline x 1 week

## 2020-08-28 NOTE — PLAN OF CARE
Problem: Potential for Falls  Goal: Patient will remain free of falls  Description: INTERVENTIONS:  - Assess patient frequently for physical needs  -  Identify cognitive and physical deficits and behaviors that affect risk of falls    -  [place] fall precautions as indicated by assessment   - Educate patient/family on patient safety including physical limitations  - Instruct patient to call for assistance with activity based on assessment  - Modify environment to reduce risk of injury  - Consider OT/PT consult to assist with strengthening/mobility  Outcome: Progressing     Problem: PAIN - ADULT  Goal: Verbalizes/displays adequate comfort level or baseline comfort level  Description: Interventions:  - Encourage patient to monitor pain and request assistance  - Assess pain using appropriate pain scale  - Administer analgesics based on type and severity of pain and evaluate response  - Implement non-pharmacological measures as appropriate and evaluate response  - Consider cultural and social influences on pain and pain management  - Notify physician/advanced practitioner if interventions unsuccessful or patient reports new pain  Outcome: Progressing     Problem: INFECTION - ADULT  Goal: Absence or prevention of progression during hospitalization  Description: INTERVENTIONS:  - Assess and monitor for signs and symptoms of infection  - Monitor lab/diagnostic results  - Monitor all insertion sites, i e  indwelling lines, tubes, and drains  - Monitor endotracheal if appropriate and nasal secretions for changes in amount and color  - [place] appropriate cooling/warming therapies per order  - Administer medications as ordered  - Instruct and encourage patient and family to use good hand hygiene technique  - Identify and instruct in appropriate isolation precautions for identified infection/condition  Outcome: Progressing  Goal: Absence of fever/infection during neutropenic period  Description: INTERVENTIONS:  - Monitor WBC    Outcome: Progressing     Problem: SAFETY ADULT  Goal: Patient will remain free of falls  Description: INTERVENTIONS:  - Assess patient frequently for physical needs  -  Identify cognitive and physical deficits and behaviors that affect risk of falls    -  [place] fall precautions as indicated by assessment   - Educate patient/family on patient safety including physical limitations  - Instruct patient to call for assistance with activity based on assessment  - Modify environment to reduce risk of injury  - Consider OT/PT consult to assist with strengthening/mobility  Outcome: Progressing  Goal: Maintain or return to baseline ADL function  Description: INTERVENTIONS:  -  Assess patient's ability to carry out ADLs; assess patient's baseline for ADL function and identify physical deficits which impact ability to perform ADLs (bathing, care of mouth/teeth, toileting, grooming, dressing, etc )  - Assess/evaluate cause of self-care deficits   - Assess range of motion  - Assess patient's mobility; develop plan if impaired  - Assess patient's need for assistive devices and provide as appropriate  - Encourage maximum independence but intervene and supervise when necessary  - Involve family in performance of ADLs  - Assess for home care needs following discharge   - Consider OT consult to assist with ADL evaluation and planning for discharge  - Provide patient education as appropriate  Outcome: Progressing  Goal: Maintain or return mobility status to optimal level  Description: INTERVENTIONS:  - Assess patient's baseline mobility status (ambulation, transfers, stairs, etc )    - Identify cognitive and physical deficits and behaviors that affect mobility  - Identify mobility aids required to assist with transfers and/or ambulation (gait belt, sit-to-stand, lift, walker, cane, etc )  - [place] fall precautions as indicated by assessment  - Record patient progress and toleration of activity level on Mobility SBAR; progress patient to next Phase/Stage  - Instruct patient to call for assistance with activity based on assessment  - Consider rehabilitation consult to assist with strengthening/weightbearing, etc   Outcome: Progressing     Problem: DISCHARGE PLANNING  Goal: Discharge to home or other facility with appropriate resources  Description: INTERVENTIONS:  - Identify barriers to discharge w/patient and caregiver  - Arrange for needed discharge resources and transportation as appropriate  - Identify discharge learning needs (meds, wound care, etc )  - Arrange for interpretive services to assist at discharge as needed  - Refer to Case Management Department for coordinating discharge planning if the patient needs post-hospital services based on physician/advanced practitioner order or complex needs related to functional status, cognitive ability, or social support system  Outcome: Progressing     Problem: Knowledge Deficit  Goal: Patient/family/caregiver demonstrates understanding of disease process, treatment plan, medications, and discharge instructions  Description: Complete learning assessment and assess knowledge base    Interventions:  - Provide teaching at level of understanding  - Provide teaching via preferred learning methods  Outcome: Progressing

## 2020-08-28 NOTE — PROGRESS NOTES
Vancomycin Assessment    Elidia Ken is a 60 y o  male who is currently receiving vancomycin 1500 mg IV once for skin-soft tissue infection, other bone/joint infection   Relevant clinical data and objective history reviewed:  Creatinine   Date Value Ref Range Status   08/28/2020 0 94 0 60 - 1 30 mg/dL Final     Comment:     Standardized to IDMS reference method   08/12/2020 0 86 0 60 - 1 30 mg/dL Final     Comment:     Standardized to IDMS reference method   08/11/2020 0 79 0 60 - 1 30 mg/dL Final     Comment:     Standardized to IDMS reference method     /97 (BP Location: Right arm)   Pulse 87   Temp 98 4 °F (36 9 °C) (Oral)   Resp 15   SpO2 98%   No intake/output data recorded  Lab Results   Component Value Date/Time    BUN 19 08/28/2020 03:58 PM    WBC 8 93 08/28/2020 03:58 PM    HGB 12 3 08/28/2020 03:58 PM    HCT 36 4 (L) 08/28/2020 03:58 PM    MCV 90 08/28/2020 03:58 PM     08/28/2020 03:58 PM     Temp Readings from Last 3 Encounters:   08/28/20 98 4 °F (36 9 °C) (Oral)   08/20/20 (!) 97 3 °F (36 3 °C) (Tympanic)   08/12/20 98 5 °F (36 9 °C)     Vancomycin Days of Therapy: 1    Assessment/Plan  The patient is currently on vancomycin utilizing scheduled dosing based on adjusted body weight (due to obesity)  Baseline risks associated with therapy include: concomitant nephrotoxic medications  The patient is currently receiving 1500 mg IV once and after clinical evaluation will be changed to 2000 mg IV q 12 H  Pharmacy will also follow closely for s/sx of nephrotoxicity, infusion reactions, and appropriateness of therapy  BMP and CBC will be ordered per protocol  Plan for trough as patient approaches steady state, prior to the 4th  dose at approximately 0330 on 8/30/20  Due to infection severity, will target a trough of 15-20 (appropriate for most indications)   Pharmacy will continue to follow the patients culture results and clinical progress daily      Rayshawn Navarrete, Pharmacist

## 2020-08-28 NOTE — H&P
H&P- Radha Peck 1965, 60 y o  male MRN: [id number]    Unit/Bed#: [facility] Encounter: [id number]    Primary Care Provider: Tristian Galdamez MD   Date and time admitted to hospital: 8/28/2020  3:14 PM        * Wound infection  Assessment & Plan  · Presents from podiatry office due to concerns for ongoing wound infection, following amputation right great toe for gangrene earlier this month  · Consult podiatry - Dr Tushar Davila requesting XR and MRI of the foot, likely back to OR Monday or Tuesday  · Consult vascular surgery - VAS studies earlier this month showed healing range, however his wound is nonhealing   · Consult ID - multiple organisms isolated from wound earlier this month (enterobacter, morganella, citrobacter, and now MRSA)  Previously on Cefepime/Flagyl, then Bactrim/Flagyl during last admission; most recently on Keflex then Doxycycline outpatient without improvement  · Continue Vanco, pharmacy consulted for management  · Continue cefepime, high dosing as per previous admission  · Check blood cultures, lactic, CBC with differential, CMP  · NONWEIGHT BEARING RLE  · Daily dressings - Maxorb + DSD  · PT/OT consults    Amputation of right great toe [facility])  Assessment & Plan  · With ongoing wound, concern for deeper infection  · XR and MRI pending    Hyperthyroidism  Assessment & Plan  · Diagnosed with thyroid cancer in July  · Continue methimazole  · TSH was still low last check 7/2020  · Recheck TSH    Type 2 diabetes mellitus with diabetic polyneuropathy, with long-term current use of insulin (HCC)  Assessment & Plan  Lab Results   Component Value Date    HGBA1C 9 7 (H) 08/05/2020       No results for input(s): POCGLU in the last 72 hours      Blood Sugar Average: Last 72 hrs:  · Uncontrolled by last A1c  · Continue lantus 80u at night as per home dose  · Continue humalog and decrease to 30u with meals due to reported hypoglycemia (80-90 at home) and decreased intake  · SSI algorithm 2 for now, adjust based on BG values  · ACHS blood glucose checks, CCO diet    Hyperlipidemia  Assessment & Plan  · Continue statin    Gastroesophageal reflux disease without esophagitis  Assessment & Plan  · Continue PPI    Hypertension, essential  Assessment & Plan  · Continue anti-HTN meds  · Monitor per unit  ·         VTE Prophylaxis: Heparin  / sequential compression device   Code Status: FULL  POLST: POLST form is not discussed and not completed at this time  Discussion with family: wife at bedside     Anticipated Length of Stay:  Patient will be admitted on an Inpatient basis with an anticipated length of stay of  > 2 midnights  Justification for Hospital Stay: infection, osteomyelitis, surgery     Total Time for Visit, including Counseling / Coordination of Care: 1 hour  Greater than 50% of this total time spent on direct patient counseling and coordination of care  Chief Complaint:   "Dr Angella Kaufman sent me"     History of Present Illness:    Janene Rowe is a 60 y o  male who presents with right great toe infection  Was hospitalized earlier in August for gangrenous toe and osteomyelitis, resulting in amputation and IV antibiotics course, followed by oral antibiotics  PMHx complicated by recent diagnosis of thyroid cancer, uncontrolled type 2 diabetes, hypertension, and obesity  He is tearful and anxious  Only complaint is foul-smelling drainage from the wound, chills, and occasional lightheaded feeling when getting up  No documented fevers  Feels his appetite has been good since home, his sugars are tending to be better controlled  Review of Systems:    Review of Systems   Constitutional: Positive for activity change and chills  Negative for fatigue and fever  Respiratory: Negative for shortness of breath  Cardiovascular: Negative for chest pain and leg swelling  Gastrointestinal: Negative for constipation, diarrhea and nausea  Endocrine: Negative for polydipsia and polyuria     Genitourinary: Negative for difficulty urinating  Neurological: Positive for light-headedness  Negative for syncope and weakness  Psychiatric/Behavioral: The patient is nervous/anxious  All other systems reviewed and are negative  Past Medical and Surgical History:     Past Medical History:   Diagnosis Date    Diabetes mellitus ([address] )     Hyperlipidemia     Hypertension     Thyroid cancer [facility])        Past Surgical History:   Procedure Laterality Date    FOOT SURGERY Right 2014    IR AORTAGRAM WITH RUN-OFF  8/6/2020    TOE AMPUTATION Right 8/11/2020    Procedure: AMPUTATION TOE;  Surgeon: Estefanía Infante DPM;  Location: [facility];  Service: Podiatry    US GUIDED THYROID BIOPSY  7/2/2020       Meds/Allergies:    Prior to Admission medications    Medication Sig Start Date End Date Taking? Authorizing Provider   aspirin 81 mg chewable tablet Chew 1 tablet (81 mg total) daily 8/13/20 9/12/20 Yes Kristian Crawley MD   atorvastatin (LIPITOR) 40 mg tablet Take 1 tablet (40 mg total) by mouth daily with dinner 8/12/20 9/11/20 Yes Kristian Crawley MD   AVALIDE 300-12 5 MG per tablet Take 1 tablet by mouth daily 7/6/20  Yes Maren Sevilla MD   clopidogrel (PLAVIX) 75 mg tablet Take 1 tablet (75 mg total) by mouth daily 8/13/20 9/12/20 Yes Kristian Crawley MD   insulin glargine (Lantus SoloStar) 100 units/mL injection pen 80 u bedtime 7/10/20  Yes Maren Sevilla MD   insulin glulisine (Apidra SoloStar) 100 units/mL injection pen Inject 40 Units under the skin 3 (three) times a day with meals 7/6/20  Yes Maren Sevilla MD   methimazole (TAPAZOLE) 5 mg tablet take 1 tablet by mouth once daily (STOP MEDICATION AND CALL DOCTO     (REFER TO PRESCRIPTION NOTES)   8/3/20  Yes Historical Provider, MD   omeprazole (PriLOSEC) 40 MG capsule TAKE 1 CAPSULE DAILY 6/22/20  Yes Leticia Roberts PA-C   verapamil (VERELAN PM) 360 MG 24 hr capsule Take 360 mg by mouth daily  9/17/18  Yes Historical Provider, MD   zolpidem (AMBIEN) 10 mg tablet Take 1 tablet (10 mg total) by mouth daily at bedtime as needed for sleep 8/20/20  Yes Rusty Mcclellan MD   BD PEN NEEDLE DEV U/F 32G X 4 MM MISC Inject under the skin 4 (four) times a day 1/24/19   Mukund West PA-C   Blood Glucose Monitoring Suppl (ONE TOUCH ULTRA 2) w/Device KIT by Does not apply route 2 (two) times a day 11/2/18   Mukund West PA-C   glucose blood test strip Check blood sugar daily 2/4/20   Mukund West PA-C     I have reviewed home medications with patient personally  Allergies: No Known Allergies    Social History:     Marital Status: Single   Occupation:   Patient Pre-hospital Living Situation: home  Patient Pre-hospital Level of Mobility: was heel weight bearing RLE   Patient Pre-hospital Diet Restrictions: CCO   Substance Use History:   Social History     Substance and Sexual Activity   Alcohol Use Not Currently    Alcohol/week: 8 0 standard drinks    Types: 8 Cans of beer per week    Comment: last drink was 10 days ago     Social History     Tobacco Use   Smoking Status Never Smoker   Smokeless Tobacco Never Used     Social History     Substance and Sexual Activity   Drug Use No    Types: Marijuana       Family History:    Family History   Problem Relation Age of Onset    Cancer Mother     Hypertension Father        Physical Exam:     Vitals:   Blood Pressure: 133/97 (08/28/20 1600)  Pulse: 87 (08/28/20 1600)  Temperature: 98 4 °F (36 9 °C) (08/28/20 1525)  Temp Source: Oral (08/28/20 1525)  Respirations: 15 (08/28/20 1600)  SpO2: 98 % (08/28/20 1600)    Physical Exam  Vitals signs and nursing note reviewed  Constitutional:       Appearance: Normal appearance  He is not ill-appearing, toxic-appearing or diaphoretic  HENT:      Head: Normocephalic and atraumatic  Cardiovascular:      Rate and Rhythm: Normal rate and regular rhythm  Pulses: Normal pulses  Heart sounds: Normal heart sounds  No murmur     Pulmonary:      Effort: Pulmonary effort is normal  No respiratory distress  Breath sounds: Normal breath sounds  No wheezing or rales  Abdominal:      General: Bowel sounds are normal  There is no distension  Palpations: Abdomen is soft  Tenderness: There is no abdominal tenderness  Musculoskeletal:      Right lower leg: No edema  Left lower leg: No edema  Skin:     General: Skin is warm and dry  Comments: RLE dressing c/d/i to the foot   Neurological:      General: No focal deficit present  Mental Status: He is alert and oriented to person, place, and time  Psychiatric:         Mood and Affect: Mood is anxious  Behavior: Behavior normal          Additional Data:     Lab Results: I have personally reviewed pertinent reports  Results from last 7 days   Lab Units 08/28/20  1558   WBC Thousand/uL 8 93   HEMOGLOBIN g/dL 12 3   HEMATOCRIT % 36 4*   PLATELETS Thousands/uL 366   NEUTROS PCT % 58   LYMPHS PCT % 26   MONOS PCT % 12   EOS PCT % 3                           Imaging: I have personally reviewed pertinent reports  MRI inpatient order    (Results Pending)   XR foot 3+ vw right    (Results Pending)       EKG, Pathology, and Other Studies Reviewed on Admission:   · EKG:     Allscripts / Epic Records Reviewed: Yes     ** Please Note: This note has been constructed using a voice recognition system   **

## 2020-08-28 NOTE — ASSESSMENT & PLAN NOTE
Lab Results   Component Value Date    HGBA1C 9 7 (H) 08/05/2020       No results for input(s): POCGLU in the last 72 hours      Blood Sugar Average: Last 72 hrs:  · Uncontrolled by last A1c  · Continue lantus 80u at night as per home dose  · Continue humalog and decrease to 30u with meals due to reported hypoglycemia (80-90 at home) and decreased intake  · SSI algorithm 2 for now, adjust based on BG values  · ACHS blood glucose checks, CCO diet

## 2020-08-29 LAB
ANION GAP SERPL CALCULATED.3IONS-SCNC: 9 MMOL/L (ref 4–13)
BUN SERPL-MCNC: 13 MG/DL (ref 5–25)
CALCIUM SERPL-MCNC: 8.7 MG/DL (ref 8.3–10.1)
CHLORIDE SERPL-SCNC: 100 MMOL/L (ref 100–108)
CO2 SERPL-SCNC: 26 MMOL/L (ref 21–32)
CREAT SERPL-MCNC: 0.93 MG/DL (ref 0.6–1.3)
ERYTHROCYTE [DISTWIDTH] IN BLOOD BY AUTOMATED COUNT: 11.9 % (ref 11.6–15.1)
GFR SERPL CREATININE-BSD FRML MDRD: 91 ML/MIN/1.73SQ M
GLUCOSE SERPL-MCNC: 174 MG/DL (ref 65–140)
GLUCOSE SERPL-MCNC: 184 MG/DL (ref 65–140)
GLUCOSE SERPL-MCNC: 213 MG/DL (ref 65–140)
GLUCOSE SERPL-MCNC: 76 MG/DL (ref 65–140)
GLUCOSE SERPL-MCNC: 76 MG/DL (ref 65–140)
HCT VFR BLD AUTO: 34.2 % (ref 36.5–49.3)
HGB BLD-MCNC: 11.2 G/DL (ref 12–17)
MCH RBC QN AUTO: 30 PG (ref 26.8–34.3)
MCHC RBC AUTO-ENTMCNC: 32.7 G/DL (ref 31.4–37.4)
MCV RBC AUTO: 92 FL (ref 82–98)
PLATELET # BLD AUTO: 334 THOUSANDS/UL (ref 149–390)
PMV BLD AUTO: 10.3 FL (ref 8.9–12.7)
POTASSIUM SERPL-SCNC: 4.3 MMOL/L (ref 3.5–5.3)
RBC # BLD AUTO: 3.73 MILLION/UL (ref 3.88–5.62)
SODIUM SERPL-SCNC: 135 MMOL/L (ref 136–145)
WBC # BLD AUTO: 9.52 THOUSAND/UL (ref 4.31–10.16)

## 2020-08-29 PROCEDURE — 82948 REAGENT STRIP/BLOOD GLUCOSE: CPT

## 2020-08-29 PROCEDURE — 80048 BASIC METABOLIC PNL TOTAL CA: CPT | Performed by: PHYSICIAN ASSISTANT

## 2020-08-29 PROCEDURE — 99255 IP/OBS CONSLTJ NEW/EST HI 80: CPT | Performed by: NURSE PRACTITIONER

## 2020-08-29 PROCEDURE — 85027 COMPLETE CBC AUTOMATED: CPT | Performed by: PHYSICIAN ASSISTANT

## 2020-08-29 PROCEDURE — 99233 SBSQ HOSP IP/OBS HIGH 50: CPT | Performed by: STUDENT IN AN ORGANIZED HEALTH CARE EDUCATION/TRAINING PROGRAM

## 2020-08-29 RX ORDER — INSULIN GLARGINE 100 [IU]/ML
50 INJECTION, SOLUTION SUBCUTANEOUS
Status: DISCONTINUED | OUTPATIENT
Start: 2020-08-29 | End: 2020-08-30

## 2020-08-29 RX ADMIN — VANCOMYCIN HYDROCHLORIDE 2000 MG: 1 INJECTION, POWDER, LYOPHILIZED, FOR SOLUTION INTRAVENOUS at 04:31

## 2020-08-29 RX ADMIN — ATORVASTATIN CALCIUM 40 MG: 40 TABLET, FILM COATED ORAL at 17:05

## 2020-08-29 RX ADMIN — INSULIN LISPRO 1 UNITS: 100 INJECTION, SOLUTION INTRAVENOUS; SUBCUTANEOUS at 17:06

## 2020-08-29 RX ADMIN — VERAPAMIL HYDROCHLORIDE 240 MG: 120 TABLET, FILM COATED, EXTENDED RELEASE ORAL at 08:13

## 2020-08-29 RX ADMIN — CLOPIDOGREL BISULFATE 75 MG: 75 TABLET ORAL at 08:14

## 2020-08-29 RX ADMIN — SODIUM CHLORIDE 100 ML/HR: 0.9 INJECTION, SOLUTION INTRAVENOUS at 04:30

## 2020-08-29 RX ADMIN — METHIMAZOLE 5 MG: 5 TABLET ORAL at 08:14

## 2020-08-29 RX ADMIN — ASPIRIN 81 MG 81 MG: 81 TABLET ORAL at 08:14

## 2020-08-29 RX ADMIN — CEFEPIME HYDROCHLORIDE 2000 MG: 2 INJECTION, POWDER, FOR SOLUTION INTRAVENOUS at 17:06

## 2020-08-29 RX ADMIN — VANCOMYCIN HYDROCHLORIDE 2000 MG: 1 INJECTION, POWDER, LYOPHILIZED, FOR SOLUTION INTRAVENOUS at 15:04

## 2020-08-29 RX ADMIN — PANTOPRAZOLE SODIUM 20 MG: 20 TABLET, DELAYED RELEASE ORAL at 05:21

## 2020-08-29 RX ADMIN — CEFEPIME HYDROCHLORIDE 2000 MG: 2 INJECTION, POWDER, FOR SOLUTION INTRAVENOUS at 22:16

## 2020-08-29 RX ADMIN — CEFEPIME HYDROCHLORIDE 2000 MG: 2 INJECTION, POWDER, FOR SOLUTION INTRAVENOUS at 02:19

## 2020-08-29 RX ADMIN — INSULIN GLARGINE 50 UNITS: 100 INJECTION, SOLUTION SUBCUTANEOUS at 22:15

## 2020-08-29 RX ADMIN — CEFEPIME HYDROCHLORIDE 2000 MG: 2 INJECTION, POWDER, FOR SOLUTION INTRAVENOUS at 08:13

## 2020-08-29 RX ADMIN — INSULIN LISPRO 2 UNITS: 100 INJECTION, SOLUTION INTRAVENOUS; SUBCUTANEOUS at 22:15

## 2020-08-29 RX ADMIN — ZOLPIDEM TARTRATE 10 MG: 5 TABLET, COATED ORAL at 22:14

## 2020-08-29 RX ADMIN — INSULIN LISPRO 1 UNITS: 100 INJECTION, SOLUTION INTRAVENOUS; SUBCUTANEOUS at 11:56

## 2020-08-29 RX ADMIN — ENOXAPARIN SODIUM 40 MG: 40 INJECTION SUBCUTANEOUS at 11:58

## 2020-08-29 NOTE — ASSESSMENT & PLAN NOTE
75-year-old male nonsmoker with obesity, uncontrolled DM (HgbA1C 8 1), HTN, HLD, thyroid CA, GERD and PAD with recent Right popliteal PTA/Stents (IR 8/6) and R hallux amputation now presents with non-healing amputation site, dehiscence of the wound w/cellulitis  Vascular surgery consulted for evaluation of PAD and wound healing potential     Diagnostics:  -8/6 Angiogram with  popliteal with recanalization and stent angioplasty   -8/7 Post-intervention SHANNAN:  R SHANNAN 1 33/111/63; L SHANNAN 1 33/156/144  -MRI right foot 8/28/2020:  pending  -x-ray right foot 8/28/2020:  pending  -Wound culture at [facility] office: MRSA     -[facility] 8/28 pending    Plan:  -Right hallux amputation site with open wound, malodor, eschar and cellulitis of the forefoot  -RLE signals: Excellent triphasic PT doppler signal, Biphasic DP/AT, mono to biphasic Peroneal   The popliteal was biphasic to triphasic as well  -Will recheck SHANNAN for healing potential  -Continue ASA/Plavix for stent patency x 6 months   -continue Lipitor for best medical management  -Podiatry consult noted  Pt will need further resection    -continue antibiotics    On Maxipime, Vanco   -Will d/w Dr Arturo Thomason

## 2020-08-29 NOTE — ASSESSMENT & PLAN NOTE
Lab Results   Component Value Date    HGBA1C 8 1 (H) 08/28/2020       Recent Labs     08/28/20  1707 08/28/20 2058 08/29/20  0801 08/29/20  1042   POCGLU 171* 126 76 174*       Blood Sugar Average: Last 72 hrs:  · (P) 136 75     Uncontrolled  Hemoglobin A1c 8 1   · Home medication includes lantus 80u at night as per home dose  · Home med includes short-acting 40 units with meals  · Will decrease insulin coverage due to hypoglycemic episodes  · Continue Lantus 50 units q h s , meal coverage 15 units t i d   · Continue diabetic diet  · Continue with sliding scale coverage

## 2020-08-29 NOTE — ASSESSMENT & PLAN NOTE
Recent history of right hallux amputation  Presented with wound dehiscence, wound cellulitis, possible osteomyelitis  Currently afebrile, no leukocytosis noted  Fortunately acutely nontoxic appearing  Continue IV vancomycin and IV cefepime  Continue with vascular surgery recommendation  Podiatry following as well  Encourage for strict nonweightbearing to right lower extremity at this time  Continue dressing changes per podiatrist recommendation  Patient is agreeable with above plan

## 2020-08-29 NOTE — ASSESSMENT & PLAN NOTE
Now presented again with poor wound healing, wound dehiscence, possible cellulitis, osteomyelitis  Plan is as stated above under 1  And 2

## 2020-08-29 NOTE — ASSESSMENT & PLAN NOTE
Lab Results   Component Value Date    HGBA1C 8 1 (H) 08/28/2020       Recent Labs     08/28/20 1707 08/28/20 2058 08/29/20  0801   POCGLU 171* 126 76       Blood Sugar Average: Last 72 hrs:  (P) 799 [id number]     -continue to optimize BS control for optimization of wound healing   -management per [facility]

## 2020-08-29 NOTE — PLAN OF CARE
Problem: Potential for Falls  Goal: Patient will remain free of falls  Description: INTERVENTIONS:  - Assess patient frequently for physical needs  -  Identify cognitive and physical deficits and behaviors that affect risk of falls    -  [place] fall precautions as indicated by assessment   - Educate patient/family on patient safety including physical limitations  - Instruct patient to call for assistance with activity based on assessment  - Modify environment to reduce risk of injury  - Consider OT/PT consult to assist with strengthening/mobility  Outcome: Progressing     Problem: PAIN - ADULT  Goal: Verbalizes/displays adequate comfort level or baseline comfort level  Description: Interventions:  - Encourage patient to monitor pain and request assistance  - Assess pain using appropriate pain scale  - Administer analgesics based on type and severity of pain and evaluate response  - Implement non-pharmacological measures as appropriate and evaluate response  - Consider cultural and social influences on pain and pain management  - Notify physician/advanced practitioner if interventions unsuccessful or patient reports new pain  Outcome: Progressing     Problem: INFECTION - ADULT  Goal: Absence or prevention of progression during hospitalization  Description: INTERVENTIONS:  - Assess and monitor for signs and symptoms of infection  - Monitor lab/diagnostic results  - Monitor all insertion sites, i e  indwelling lines, tubes, and drains  - Monitor endotracheal if appropriate and nasal secretions for changes in amount and color  - [place] appropriate cooling/warming therapies per order  - Administer medications as ordered  - Instruct and encourage patient and family to use good hand hygiene technique  - Identify and instruct in appropriate isolation precautions for identified infection/condition  Outcome: Progressing  Goal: Absence of fever/infection during neutropenic period  Description: INTERVENTIONS:  - Monitor WBC    Outcome: Progressing     Problem: SAFETY ADULT  Goal: Patient will remain free of falls  Description: INTERVENTIONS:  - Assess patient frequently for physical needs  -  Identify cognitive and physical deficits and behaviors that affect risk of falls    -  [place] fall precautions as indicated by assessment   - Educate patient/family on patient safety including physical limitations  - Instruct patient to call for assistance with activity based on assessment  - Modify environment to reduce risk of injury  - Consider OT/PT consult to assist with strengthening/mobility  Outcome: Progressing  Goal: Maintain or return to baseline ADL function  Description: INTERVENTIONS:  -  Assess patient's ability to carry out ADLs; assess patient's baseline for ADL function and identify physical deficits which impact ability to perform ADLs (bathing, care of mouth/teeth, toileting, grooming, dressing, etc )  - Assess/evaluate cause of self-care deficits   - Assess range of motion  - Assess patient's mobility; develop plan if impaired  - Assess patient's need for assistive devices and provide as appropriate  - Encourage maximum independence but intervene and supervise when necessary  - Involve family in performance of ADLs  - Assess for home care needs following discharge   - Consider OT consult to assist with ADL evaluation and planning for discharge  - Provide patient education as appropriate  Outcome: Progressing  Goal: Maintain or return mobility status to optimal level  Description: INTERVENTIONS:  - Assess patient's baseline mobility status (ambulation, transfers, stairs, etc )    - Identify cognitive and physical deficits and behaviors that affect mobility  - Identify mobility aids required to assist with transfers and/or ambulation (gait belt, sit-to-stand, lift, walker, cane, etc )  - [place] fall precautions as indicated by assessment  - Record patient progress and toleration of activity level on Mobility SBAR; progress patient to next Phase/Stage  - Instruct patient to call for assistance with activity based on assessment  - Consider rehabilitation consult to assist with strengthening/weightbearing, etc   Outcome: Progressing     Problem: DISCHARGE PLANNING  Goal: Discharge to home or other facility with appropriate resources  Description: INTERVENTIONS:  - Identify barriers to discharge w/patient and caregiver  - Arrange for needed discharge resources and transportation as appropriate  - Identify discharge learning needs (meds, wound care, etc )  - Arrange for interpretive services to assist at discharge as needed  - Refer to Case Management Department for coordinating discharge planning if the patient needs post-hospital services based on physician/advanced practitioner order or complex needs related to functional status, cognitive ability, or social support system  Outcome: Progressing     Problem: Knowledge Deficit  Goal: Patient/family/caregiver demonstrates understanding of disease process, treatment plan, medications, and discharge instructions  Description: Complete learning assessment and assess knowledge base    Interventions:  - Provide teaching at level of understanding  - Provide teaching via preferred learning methods  Outcome: Progressing

## 2020-08-29 NOTE — CONSULTS
Consult- Do Leija 1965, 60 y o  male MRN: [id number]    Unit/Bed#: -01 Encounter: [id number]    Primary Care Provider: Colton Chino MD   Date and time admitted to hospital: 8/28/2020  3:14 PM      Inpatient consult to Vascular Surgery  Consult performed by: NAILA Demarco  Consult ordered by: Zuleyma Pratt DPM          PAD (peripheral artery disease) [facility])  Assessment & Plan  49-year-old male nonsmoker with obesity, uncontrolled DM (HgbA1C 8 1), HTN, HLD, thyroid CA, GERD and PAD with recent Right popliteal PTA/Stents (IR 8/6) and R hallux amputation now presents with non-healing amputation site, dehiscence of the wound w/cellulitis  Vascular surgery consulted for evaluation of PAD and wound healing potential     Diagnostics:  -8/6 Angiogram with  popliteal with recanalization and stent angioplasty   -8/7 Post-intervention SHANNAN:  R SHANNAN 1 33/111/63; L SHANNAN 1 33/156/144  -MRI right foot 8/28/2020:  pending  -x-ray right foot 8/28/2020:  pending  -Wound culture at [facility] office: MRSA     -[facility] 8/28 pending    Plan:  -Right hallux amputation site with open wound, malodor, eschar and cellulitis of the forefoot  -RLE signals: Excellent triphasic PT doppler signal, Biphasic DP/AT, mono to biphasic Peroneal   The popliteal was biphasic to triphasic as well  -Will recheck SHANNAN for healing potential  If significant change, will check full LEAD on the RLE  - LEAD is ordered   -Continue ASA/Plavix for stent patency x 6 months   -continue Lipitor for best medical management  -Podiatry consult noted  Pt will need further resection    -continue antibiotics  On Maxipime, Vanco   -Will d/w Dr Olga Linares  If popliteal stent is occluded, patient may need lower extremity bypass  Will have further recommendations following the LEAD/SHANNAN      Type 2 diabetes mellitus with diabetic polyneuropathy, with long-term current use of insulin [facility])  Assessment & Plan  Lab Results   Component Value Date    HGBA1C 8 1 (H) 08/28/2020       Recent Labs     08/28/20  1707 08/28/20 2058 08/29/20  0801   POCGLU 171* 126 76       Blood Sugar Average: Last 72 hrs:  (P) 853 [id number]     -continue to optimize BS control for optimization of wound healing   -management per SLIM      Hyperlipidemia  Assessment & Plan  -continue to optimize medical management  -Atorvastatin 40mg   -management per SLIM    Hypertension, essential  Assessment & Plan  -BP well controlled  -continue current medical regimen  -management per primary medical service    * Wound infection  Assessment & Plan  R hallux amputation site with wound infection and associated cellulitis  -see plan above    Consult Note - Vascular Surgery     Consulting Service: Podiatry    Chief Complaint: Right foot wound dehiscence and cellulitis with associated PAD    HPI: Klever Schaefer is a 60 y o  male who presents with nonsmoker with obesity, uncontrolled DM (HgbA1C 8 1), HTN, HLD, thyroid CA, GERD and PAD with recent Right popliteal PTA/Stents (IR 8/6) and R hallux amputation now presents with non-healing amputation site, dehiscence of the wound w/cellulitis  Vascular surgery consulted for evaluation of PAD and wound healing potential   Patient states that since discharge he had VNA one time due to insurance issues  His girlfriend was changing the foot dressing on a daily basis and he did not notice any issues until he saw Podiatry in the office yesterday where he states "there was a bone sticking out"  He has minimally ambulated since discharge, denies any claudication or rest pain and denies any pain to the right foot  No change in M/S  He noticed yesterday a foul smell from the wound and reports that Thursday he had some "chills" but otherwise denies any systemic symptoms such as F/CP/SOB/N/V  He reports he has been compliant with his ASA/Plavix    He is upset today that he needs further intervention on the foot and that the blood flow might not be adequate and is not happy about obtaining further testing  I explained this is necessary so we can determine any change to the blood flow and healing potential prior to his resection with Podiatry        Review of Systems:  General: positive for  - chills  Cardiovascular: no chest pain or dyspnea on exertion  Respiratory: no cough, shortness of breath, or wheezing  Gastrointestinal: no abdominal pain, change in bowel habits, or black or bloody stools  Genitourinary ROS: no dysuria, trouble voiding, or hematuria  Musculoskeletal ROS: negative  Neurological ROS: no TIA or stroke symptoms  Hematological and Lymphatic ROS: negative  Dermatological ROS: positive for R foot wound  Psychological ROS: negative  Ophthalmic ROS: negative  ENT ROS: negative    Past Medical History:  Past Medical History:   Diagnosis Date    Diabetes mellitus ([facility] [address] )     Hyperlipidemia     Hypertension     Thyroid cancer ([address] )        Past Surgical History:  Past Surgical History:   Procedure Laterality Date    FOOT SURGERY Right 2014    IR AORTAGRAM WITH RUN-OFF  8/6/2020    TOE AMPUTATION Right 8/11/2020    Procedure: AMPUTATION TOE;  Surgeon: Liam Augustine DPM;  Location[facility] OR;  Service: Podiatry    US GUIDED THYROID BIOPSY  7/2/2020       Social History:  Social History     Substance and Sexual Activity   Alcohol Use Not Currently    Alcohol/week: 8 0 standard drinks    Types: 8 Cans of beer per week    Comment: last drink was 10 days ago     Social History     Substance and Sexual Activity   Drug Use No    Types: Marijuana     Social History     Tobacco Use   Smoking Status Never Smoker   Smokeless Tobacco Never Used       Family History:  Family History   Problem Relation Age of Onset    Cancer Mother     Hypertension Father        Allergies:  No Known Allergies    Medications:  Current Facility-Administered Medications   Medication Dose Route Frequency    acetaminophen (TYLENOL) tablet 650 mg  650 mg Oral Q6H PRN    aluminum-magnesium hydroxide-simethicone (MYLANTA) 200-200-20 mg/5 mL oral suspension 30 mL  30 mL Oral Q6H PRN    aspirin chewable tablet 81 mg  81 mg Oral Daily    atorvastatin (LIPITOR) tablet 40 mg  40 mg Oral Daily With Dinner    cefepime (MAXIPIME) 2,000 mg in dextrose 5 % 50 mL IVPB  2,000 mg Intravenous Q8H    clopidogrel (PLAVIX) tablet 75 mg  75 mg Oral Daily    insulin glargine (LANTUS) subcutaneous injection 80 Units 0 8 mL  80 Units Subcutaneous HS    insulin lispro (HumaLOG) 100 units/mL subcutaneous injection 1-5 Units  1-5 Units Subcutaneous 4x Daily (AC & HS)    insulin lispro (HumaLOG) 100 units/mL subcutaneous injection 30 Units  30 Units Subcutaneous TID With Meals    LORazepam (ATIVAN) tablet 0 5 mg  0 5 mg Oral BID PRN    methimazole (TAPAZOLE) tablet 5 mg  5 mg Oral Daily    ondansetron (ZOFRAN) injection 4 mg  4 mg Intravenous Q6H PRN    pantoprazole (PROTONIX) EC tablet 20 mg  20 mg Oral Early Morning    polyethylene glycol (MIRALAX) packet 17 g  17 g Oral Daily PRN    sodium chloride 0 9 % infusion  100 mL/hr Intravenous Continuous    vancomycin (VANCOCIN) 2,000 mg in sodium chloride 0 9 % 500 mL IVPB  20 mg/kg (Adjusted) Intravenous Q12H    verapamil (CALAN-SR) CR tablet 240 mg  240 mg Oral Daily    zolpidem (AMBIEN) tablet 10 mg  10 mg Oral HS PRN       Vitals:  Vitals:    08/28/20 1706 08/28/20 1713 08/28/20 2302 08/29/20 0759   BP:  130/89 122/75 127/83   BP Location:       Pulse:  85 85    Resp:  18 17 17   Temp:  98 °F (36 7 °C) 99 3 °F (37 4 °C) 98 °F (36 7 °C)   TempSrc:       SpO2:  97% 97%    Weight: 115 kg (253 lb 12 oz)      Height: 6' 4" (1 93 m)          I/Os:  I/O last 3 completed shifts: In: 1550 [P O :250;  I V :800; IV Piggyback:500]  Out: -   I/O this shift:  In: -   Out: 375 [Urine:375]    Lab Results and Cultures:   CBC with diff:   Lab Results   Component Value Date    WBC 9 52 08/29/2020    HGB 11 2 (L) 08/29/2020    HCT 34 2 (L) 08/29/2020    MCV 92 08/29/2020    PLT 334 08/29/2020    MCH 30 0 08/29/2020    MCHC 32 7 08/29/2020    RDW 11 9 08/29/2020    MPV 10 3 08/29/2020    NRBC 0 08/28/2020   ,   BMP/CMP:  Lab Results   Component Value Date    K 4 3 08/29/2020     08/29/2020    CO2 26 08/29/2020    BUN 13 08/29/2020    CREATININE 0 93 08/29/2020    CALCIUM 8 7 08/29/2020    AST 50 (H) 08/28/2020    ALT 44 08/28/2020    ALKPHOS 97 08/28/2020    EGFR 91 08/29/2020   ,   Lipid Panel: No results found for: CHOL,   Coags:   Lab Results   Component Value Date    PTT 29 08/04/2020    INR 1 04 08/04/2020   ,     Blood Culture:   Lab Results   Component Value Date    BLOODCX Received in Microbiology Lab  Culture in Progress  08/28/2020    BLOODCX Received in Microbiology Lab  Culture in Progress  08/28/2020   ,   Urinalysis:   Lab Results   Component Value Date    COLORU Yellow 09/27/2019    CLARITYU Clear 09/27/2019    SPECGRAV 1 020 09/27/2019    PHUR 5 5 09/27/2019    PHUR 6 0 11/15/2018    LEUKOCYTESUR (A) 09/27/2019     Elevated glucose may cause decreased leukocyte values  See urine microscopic for [facility] result/    NITRITE Negative 09/27/2019    GLUCOSEU >=1000 (1%) (A) 09/27/2019    KETONESU Negative 09/27/2019    BILIRUBINUR Negative 09/27/2019    BLOODU Moderate (A) 09/27/2019   ,   Urine Culture: No results found for: URINECX,   Wound Culure:   Lab Results   Component Value Date    WOUNDCULT 3+ Growth of Morganella morganii (A) 08/04/2020    WOUNDCULT 2+ Growth of Citrobacter freundii (A) 08/04/2020    WOUNDCULT 2+ Growth of Enterobacter cloacae complex (A) 08/04/2020    WOUNDCULT 2+ Growth of  08/04/2020       Imaging:  MRI R Foot pending  Xray R Foot pending    -8/6/2020 Aortagram:  FINDINGS:   1   Real-time ultrasound guidance showed the left common femoral artery to be anechoic and freely compressible  2   Aortography above the level of the renal arteries showed single renal arteries bilaterally  The infrarenal abdominal aorta was normal in caliber    3  Arteriography of the right lower extremity showed the right common and external iliac arteries to have mild calcific atherosclerotic disease  The vessel was normal in caliber  Visualized branches of the internal iliac artery were normal   The   common femoral artery was normal in caliber, without atherosclerotic disease  Visualized branches of the profunda artery were normal in caliber, without atherosclerotic disease  The superficial femoral artery had areas of dense atherosclerotic disease,   however none of these areas were flow-limiting, nor did they result in greater than 50% stenosis  The P1 segment had areas of mild calcific narrowing  A chronic total occlusion extended from the right P2 segment into the right P3 segment  The   terminal popliteal artery was patent  Three-vessel tibial runoff reconstituted from collaterals  The anterior tibial artery was normal   There were areas of mild narrowing involving the right tibioperoneal trunk as well as within the proximal right   posterior tibial artery  The peroneal artery was normal   The calcaneal branches and dorsalis pedis artery were normal   The pedal plantar arch was intact  In-line flow to the foot was provided by the anterior tibial artery  4   Interval arteriography of the right anterior tibial artery after recanalization of the chronic total occlusion confirmed intraluminal location  5  Interval arteriography after balloon angioplasty and stenting of the distal popliteal artery showed improved luminal caliber  3 vessel tibial runoff to the foot was preserved      IMPRESSION:  Recanalization, angioplasty, and stenting of the distal right popliteal artery      -SHANNAN post-intervention 8/7/2020: Impression     RIGHT LOWER LIMB  Ankle/Brachial Index: 1 33 which is in the normal range  Prior 0 7  PPG/PVR Tracings are normal   Metatarsal Pressure 111 mmHg  Great Toe Pressure: 63 mmHg, within the healing range       LEFT LOWER LIMB  Ankle/Brachial Index: 1 33 which is in the normal range  Prior 1 30  PPG/PVR Tracings are normal   Metatarsal Pressure 156 mmHg  Great Toe Pressure: 144 mmHg, within the healing range  Physical Exam:    General appearance: alert and oriented, in no acute distress, cooperative, no distress and mildly obese  Head: Normocephalic, without obvious abnormality, atraumatic  Eyes: PERRL, EOMIs  Throat: lips, mucosa, and tongue normal; teeth and gums normal  Neck: no adenopathy, no carotid bruit, no JVD, supple, symmetrical, trachea midline and thyroid not enlarged, symmetric, no tenderness/mass/nodules  Lungs: clear to auscultation bilaterally  Chest wall: no tenderness  Heart: regular rate and rhythm, S1, S2 normal, no murmur, click, rub or gallop  Abdomen: soft, non-tender; bowel sounds normal; no masses,  no organomegaly  Extremities: B/L LE are warm  The right foot has a dressing that was moved for pulse exam but not fully removed  M/S at baseline per patient  No edema to B/L LEs  Easily palpable L DP pulse  The Right leg has excellent triphasic PT signal, the DP/AT is biphasic and Peroneal mono to biphasic  Popliteal signal on the right biphasic to triphasic  Skin: Skin color, texture, turgor normal  No rashes or lesions or EXCEPT wound to the right foot which is currently with dry dressing  Neurologic: Grossly normal    Wound/Incision:  Right foot wound with Dressing intact  Pulse exam:  Radial: Right: 2+ Left[de-identified] 2+  Femoral: Right: 2+ Left: 2+  Popliteal: Right: doppler signal Left: 1+  DP: Right: doppler signal Left: 2+  PT: Right: doppler signal Left: doppler signal  Doppler signals to RLE: excellent triphasic PT signal, the DP/AT is biphasic and Peroneal mono to biphasic  Popliteal signal on the right biphasic to triphasic            Kristie Lopez [address] Brennan  8/29/2020  [facility]  [phone]

## 2020-08-29 NOTE — UTILIZATION REVIEW
Initial Clinical Review    Admission: Date/Time/Statement:   Admission Orders (From admission, onward)     Ordered        08/28/20 1534  Inpatient Admission  Once                   Orders Placed This Encounter   Procedures    Inpatient Admission     Standing Status:   Standing     Number of Occurrences:   1     Order Specific Question:   Admitting Physician     Answer:   Gennyrox Neves [[id number]]     Order Specific Question:   Level of Care     Answer:   Med Surg [16]     Order Specific Question:   Estimated length of stay     Answer:   More than 2 Midnights     Order Specific Question:   Certification     Answer:   I certify that inpatient services are medically necessary for this patient for a duration of greater than two midnights  See H&P and MD Progress Notes for additional information about the patient's course of treatment  ED Arrival Information     Expected Arrival Acuity Means of Arrival Escorted By Service Admission Type    - 8/28/2020 15:07 Urgent Walk-In Spouse General Medicine Urgent    Arrival Complaint    Post Op Infection         Chief Complaint   Patient presents with    Post-Op Infection     Pt has infection on Right great toe  Saw MD, states he is here for admission and reoperation on Monday  Assessment/Plan: 59 yo male to ED from home w/ R great toe infection   Was hospitalized earlier in August for gangrenous toe and osteomyelitis, resulting in amputation and IV antibiotics course, followed by oral antibiotics  c/o foul smelling drainage from wound , chills and occasional lightheaded feeling when getting up   Tearful and anxious  Admitted IP status for cont IV abx , consult podiatry , vascular surgery and ID   Check BC , CBC and cmp   Daily dressing changes , PT OT consult   XR and MRI pending , concern for deeper infection   8/28 Podiatry consult   Osteomyelitis to the right foot, IV abx per ID , MRI R foot , plan for OR further resection of the first ray right foot   NWB , dressing DSD       8/29 Vascular consult   Recent Right popliteal PTA/Stents (IR 8/6) and R hallux amputation now presents with non-healing amputation site, dehiscence of the wound w/cellulitis  Vascular surgery consulted for evaluation of PAD and wound healing potential Right hallux amputation site with open wound, malodor, eschar and cellulitis of the forefoot  RLE signals: Excellent triphasic PT doppler signal, Biphasic DP/AT, mono to biphasic Peroneal   The popliteal was biphasic to triphasic as well  Will recheck SHANNAN for healing potential  If significant change, will check full LEAD on the RLE  Cont ASA plavix x6 months , cont lipitor , abx   Podiatry following       8/29 IM Note   PAD , non healing surgical site , wound dehiscence ,and cellulitis possible OM   Cont iv cefepime , iv vanco and podiatry following    MRI foot pending       ED Triage Vitals   Temperature Pulse Respirations Blood Pressure SpO2   08/28/20 1525 08/28/20 1525 08/28/20 1525 08/28/20 1525 08/28/20 1525   98 4 °F (36 9 °C) 86 19 133/97 98 %      Temp Source Heart Rate Source Patient Position - Orthostatic VS BP Location FiO2 (%)   08/28/20 1525 08/28/20 1525 08/28/20 1525 08/28/20 1525 --   Oral Monitor Sitting Right arm       Pain Score       08/28/20 2335       No Pain          Wt Readings from Last 1 Encounters:   08/28/20 115 kg (253 lb 12 oz)     Additional Vital Signs:   08/29/20 07:59:49   98 °F (36 7 °C)      17   127/83   98             08/28/20 23:02:06   99 3 °F (37 4 °C)   85   17   122/75   91   97 %          08/28/20 17:13:46   98 °F (36 7 °C)   85   18   130/89   103   97 %          08/28/20 1600      87   15   133/97      98 %   None (Room air)       08/28/20 1543                              Pertinent Labs/Diagnostic Test Results:   8/28 R foot xray - pending   8/28 MRI R Foot pending   Results from last 7 days   Lab Units 08/29/20  0519 08/28/20  1558   WBC Thousand/uL 9 52 8 93   HEMOGLOBIN g/dL 11 2* 12 3 HEMATOCRIT % 34 2* 36 4*   PLATELETS Thousands/uL 334 366   NEUTROS ABS Thousands/µL  --  5 16     Results from last 7 days   Lab Units 08/29/20  0519 08/28/20  1558   SODIUM mmol/L 135* 133*   POTASSIUM mmol/L 4 3 4 2   CHLORIDE mmol/L 100 96*   CO2 mmol/L 26 26   ANION GAP mmol/L 9 11   BUN mg/dL 13 19   CREATININE mg/dL 0 93 0 94   EGFR ml/min/1 73sq m 91 90   CALCIUM mg/dL 8 7 9 1     Results from last 7 days   Lab Units 08/28/20  1558   AST U/L 50*   ALT U/L 44   ALK PHOS U/L 97   TOTAL PROTEIN g/dL 8 4*   ALBUMIN g/dL 3 0*   TOTAL BILIRUBIN mg/dL 0 50     Results from last 7 days   Lab Units 08/29/20  0801 08/28/20  2058 08/28/20  1707   POC GLUCOSE mg/dl 76 126 171*     Results from last 7 days   Lab Units 08/29/20  0519 08/28/20  1558   GLUCOSE RANDOM mg/dL 76 182*     Results from last 7 days   Lab Units 08/28/20  1558   HEMOGLOBIN A1C % 8 1*   EAG mg/dl 186       Results from last 7 days   Lab Units 08/28/20  1558   TSH 3RD GENERATON uIU/mL 0 080*     Results from last 7 days   Lab Units 08/28/20  1738 08/28/20  1558   LACTIC ACID mmol/L 1 3 2 2*     Results from last 7 days   Lab Units 08/28/20  1558   BLOOD CULTURE  Received in Microbiology Lab  Culture in Progress  Received in Microbiology Lab  Culture in Progress       ED Treatment:   Medication Administration from 08/28/2020 1507 to 08/28/2020 1634       Date/Time Order Dose Route Action     08/28/2020 1618 vancomycin (VANCOCIN) 1,500 mg in sodium chloride 0 9 % 250 mL IVPB 1,500 mg Intravenous New Bag     08/28/2020 1601 piperacillin-tazobactam (ZOSYN) 3 375 g in sodium chloride 0 9 % 100 mL IVPB 3 375 g Intravenous New Bag        Past Medical History:   Diagnosis Date    Diabetes mellitus ([facility] [address] [facility] )     Hyperlipidemia     Hypertension     [facility]roid cancer ([facility] )      Present on Admission:   [facility]   Gastroesophageal reflux disease without esophagitis   Hyperlipidemia   Hypertension, essential   Hyperthyroidism   Wound infection   PAD [facility]peripheral artery disease) ([facility]      Admitting Diagnosis: Post op infection [T81 40XA]  Cellulitis of right foot [L03 115]  Age/Sex: 60 y o  male  Admission Orders:  Scheduled Medications:  aspirin, 81 mg, Oral, Daily  atorvastatin, 40 mg, Oral, Daily With Dinner  cefepime, 2,000 mg, Intravenous, Q8H  clopidogrel, 75 mg, Oral, Daily  insulin glargine, 80 Units, Subcutaneous, HS  insulin lispro, 1-5 Units, Subcutaneous, 4x Daily (AC & HS)  insulin lispro, 30 Units, Subcutaneous, TID With Meals  methimazole, 5 mg, Oral, Daily  pantoprazole, 20 mg, Oral, Early Morning  vancomycin, 20 mg/kg (Adjusted), Intravenous, Q12H  verapamil, 240 mg, Oral, Daily      Continuous IV Infusions:  sodium chloride, 100 mL/hr, Intravenous, Continuous      PRN Meds:  acetaminophen, 650 mg, Oral, Q6H PRN  aluminum-magnesium hydroxide-simethicone, 30 mL, Oral, Q6H PRN  LORazepam, 0 5 mg, Oral, BID PRN  ondansetron, 4 mg, Intravenous, Q6H PRN  polyethylene glycol, 17 g, Oral, Daily PRN  zolpidem, 10 mg, Oral, HS PRN    Fingerstick ac and hs   OT PT eval   Up and OOB   Cons carb diet   Contact isolation     IP CONSULT TO PODIATRY  IP CONSULT TO INFECTIOUS DISEASES  IP CONSULT TO VASCULAR SURGERY  IP CONSULT TO PHARMACY  IP CONSULT TO ENDOCRINOLOGY    Network Utilization Review Department  [email or web address]  ATTENTION: Please call with any questions or concerns to [phone] and carefully listen to the prompts so that you are directed to the right person  All voicemails are confidential   [facility] all requests for admission clinical reviews, approved or denied determinations and any other requests to dedicated fax number below belonging to the campus where the patient is receiving treatment   List of dedicated fax numbers for the Facilities:  FACILITY NAME UR FAX NUMBER   ADMISSION DENIALS (Administrative/Medical Necessity) [phone]   [address] (Maternity/NICU/Pediatrics) [place] [phone]   Beatrice Juan C [phone]   Prasanna Deras [phone]   [address] [phone]   [facility]  [phone]   [address] [facility]  [address] [address] [address] [facility] [phone]

## 2020-08-29 NOTE — PROGRESS NOTES
Progress Note - Amy Reasons 1965, 60 y o  male MRN: [id number]    Unit/Bed#: -01 Encounter: [id number]    Primary Care Provider: Hernando Watkins MD   Date and time admitted to hospital: 8/28/2020  3:14 PM        * Wound infection  Assessment & Plan  Recent history of right hallux amputation  Presented with wound dehiscence, wound cellulitis, possible osteomyelitis  Currently afebrile, no leukocytosis noted  Fortunately acutely nontoxic appearing  Continue IV vancomycin and IV cefepime  Continue with vascular surgery recommendation  Podiatry following as well  Encourage for strict nonweightbearing to right lower extremity at this time  Continue dressing changes per podiatrist recommendation  Patient is agreeable with above plan  PAD (peripheral artery disease) [facility])  Assessment & Plan  Patient history of recent right popliteal PTA/ stent 08/06/2020 and right hallux amputation  Now presents with nonhealing surgical site, wound dehiscence, wound cellulitis possible osteomyelitis  Right foot MRI pending  Continue IV cefepime, IV vancomycin for now  Continue aspirin, statin, Plavix  Follow-up with vascular surgery recommendation as well as podiatry following  Type 2 diabetes mellitus with diabetic polyneuropathy, with long-term current use of insulin [facility])  Assessment & Plan  Lab Results   Component Value Date    HGBA1C 8 1 (H) 08/28/2020       Recent Labs     08/28/20  1707 08/28/20  2058 08/29/20  0801 08/29/20  1042   POCGLU 171* 126 76 174*       Blood Sugar Average: Last 72 hrs:  · (P) 136 75     Uncontrolled  Hemoglobin A1c 8 1   · Home medication includes lantus 80u at night as per home dose  · Home med includes short-acting 40 units with meals  · Will decrease insulin coverage due to hypoglycemic episodes  · Continue Lantus 50 units q h s , meal coverage 15 units t i d   · Continue diabetic diet  · Continue with sliding scale coverage      Amputation of right great toe [facility])  Assessment & Plan  Now presented again with poor wound healing, wound dehiscence, possible cellulitis, osteomyelitis  Plan is as stated above under 1  And 2  Hyperthyroidism  Assessment & Plan  · Diagnosed with thyroid cancer in July  · TSH 0 080  · Free T4 1 39  · Continue home dose of methimazole 5 mg daily  · Recommended close outpatient follow-up  Hyperlipidemia  Assessment & Plan  · Continue statin    Gastroesophageal reflux disease without esophagitis  Assessment & Plan  · Continue PPI    Hypertension, essential  Assessment & Plan  · Controlled  · Continue anti-HTN meds  · Monitor per unit          VTE Pharmacologic Prophylaxis:   Pharmacologic: Enoxaparin (Lovenox)    Patient Centered Rounds: I have performed bedside rounds with nursing staff today  Discussions with Specialists or Other Care Team Provider:  Vascular surgery recommendation appreciated  Education and Discussions with Family / Patient:  Offered to call family if they have any question or concern  Time Spent for Care: 30 minutes  More than 50% of total time spent on counseling and coordination of care as described above  Current Length of Stay: 1 day(s)    Current Patient Status: Inpatient   Certification Statement: The patient will continue to require additional inpatient hospital stay due to Above    Discharge Plan: tbd    Code Status: Level 1 - Full Code      Subjective:   Afebrile, hemodynamically stable  Tolerating IV antibiotics well  Patient denies right foot pain  Patient appears comfortable not in distress  Patient denies chest pain, dyspnea, fever, chills, nausea, vomiting, abdominal pain, diarrhea  Objective:     Vitals:   Temp (24hrs), Av 4 °F (36 9 °C), Min:98 °F (36 7 °C), Max:99 3 °F (37 4 °C)    Temp:  [98 °F (36 7 °C)-99 3 °F (37 4 °C)] 98 °F (36 7 °C)  HR:  [85-87] 85  Resp:  [15-19] 17  BP: (122-133)/(75-97) 127/83  SpO2:  [97 %-98 %] 97 %  Body mass index is 30 89 kg/m²       Input and Output Summary (last 24 hours): Intake/Output Summary (Last 24 hours) at 8/29/2020 1124  Last data filed at 8/29/2020 0901  Gross per 24 hour   Intake 1600 ml   Output 375 ml   Net 1225 ml       Physical Exam:     Physical Exam  Constitutional:       General: He is not in acute distress  Appearance: Normal appearance  He is not ill-appearing  Comments: Chronically ill, acutely nontoxic appearing  HENT:      Head: Normocephalic and atraumatic  Nose: No rhinorrhea  Mouth/Throat:      Pharynx: No oropharyngeal exudate or posterior oropharyngeal erythema  Eyes:      Extraocular Movements: Extraocular movements intact  Conjunctiva/sclera: Conjunctivae normal       Pupils: Pupils are equal, round, and reactive to light  Neck:      Musculoskeletal: Normal range of motion and neck supple  No neck rigidity  Cardiovascular:      Rate and Rhythm: Normal rate and regular rhythm  Pulses: Normal pulses  Heart sounds: Normal heart sounds  Pulmonary:      Effort: Pulmonary effort is normal  No respiratory distress  Breath sounds: Normal breath sounds  No stridor  No wheezing or rhonchi  Abdominal:      General: Bowel sounds are normal  There is no distension  Palpations: Abdomen is soft  Tenderness: There is no abdominal tenderness  Musculoskeletal: Normal range of motion  General: No swelling  Comments: Right lower extremity dressing noted clean, dry, intact  Skin:     Findings: No rash  Neurological:      General: No focal deficit present  Mental Status: He is alert and oriented to person, place, and time  Mental status is at baseline     Psychiatric:         Mood and Affect: Mood normal          Behavior: Behavior normal          Additional Data:     Labs:    Results from last 7 days   Lab Units 08/29/20  0519 08/28/20  1558   WBC Thousand/uL 9 52 8 93   HEMOGLOBIN g/dL 11 2* 12 3   HEMATOCRIT % 34 2* 36 4*   PLATELETS Thousands/uL 334 366 NEUTROS PCT %  --  58   LYMPHS PCT %  --  26   MONOS PCT %  --  12   EOS PCT %  --  3     Results from last 7 days   Lab Units 08/29/20  0519 08/28/20  1558   SODIUM mmol/L 135* 133*   POTASSIUM mmol/L 4 3 4 2   CHLORIDE mmol/L 100 96*   CO2 mmol/L 26 26   BUN mg/dL 13 19   CREATININE mg/dL 0 93 0 94   ANION GAP mmol/L 9 11   CALCIUM mg/dL 8 7 9 1   ALBUMIN g/dL  --  3 0*   TOTAL BILIRUBIN mg/dL  --  0 50   ALK PHOS U/L  --  97   ALT U/L  --  44   AST U/L  --  50*   GLUCOSE RANDOM mg/dL 76 182*         Results from last 7 days   Lab Units 08/29/20  1042 08/29/20  0801 08/28/20  2058 08/28/20  1707   POC GLUCOSE mg/dl 174* 76 126 171*     Results from last 7 days   Lab Units 08/28/20  1558   HEMOGLOBIN A1C % 8 1*     Results from last 7 days   Lab Units 08/28/20  1738 08/28/20  1558   LACTIC ACID mmol/L 1 3 2 2*           * I Have Reviewed All Lab Data Listed Above  * Additional Pertinent Lab Tests Reviewed: All Labs Within Last 24 Hours Reviewed        Recent Cultures (last 7 days):     Results from last 7 days   Lab Units 08/28/20  1558   BLOOD CULTURE  Received in Microbiology Lab  Culture in Progress  Received in Microbiology Lab  Culture in Progress         Last 24 Hours Medication List:   Current Facility-Administered Medications   Medication Dose Route Frequency Provider Last Rate    acetaminophen  650 mg Oral Q6H PRN Codi Solo PA-C      aluminum-magnesium hydroxide-simethicone  30 mL Oral Q6H PRN Helen Mendoza PA-C      aspirin  81 mg Oral Daily Helen Mendoza PA-C      atorvastatin  40 mg Oral Daily With [employer] GABRIEL Alfaro      cefepime  2,000 mg Intravenous Q8H Codi Solo PA-C Stopped (08/29/20 0901)    clopidogrel  75 mg Oral Daily Helen Mendoza PA-C      insulin glargine  50 Units Subcutaneous HS Serafin YAN MD      insulin lispro  1-5 Units Subcutaneous 4x Daily (AC & HS) Helen Mendoza PA-C      insulin lispro  15 Units Subcutaneous TID With Meals [employer] Clinton Luis MD      LORazepam  0 5 mg Oral BID PRN Glendy Hua PA-C      methimazole  5 mg Oral Daily Helen Mendoza PA-C      ondansetron  4 mg Intravenous Q6H PRN Glendy Hua PA-C      pantoprazole  20 mg Oral Early Morning Helen Mendoza PA-C      polyethylene glycol  17 g Oral Daily PRN Glendy Hua PA-C      sodium chloride  100 mL/hr Intravenous Continuous Matt YAN  mL/hr (08/29/20 0430)    vancomycin  20 mg/kg (Adjusted) Intravenous Q12H Glendy Hua PA-C Stopped (08/29/20 0600)    verapamil  240 mg Oral Daily Helen Mendoza PA-C      zolpidem  10 mg Oral HS PRN Glendy Hua PA-C          Today, Patient Was Seen By: Luis Mayfield MD    ** Please Note: Dictation voice to text software may have been used in the creation of this document   **

## 2020-08-29 NOTE — ASSESSMENT & PLAN NOTE
Patient history of recent right popliteal PTA/ stent 08/06/2020 and right hallux amputation  Now presents with nonhealing surgical site, wound dehiscence, wound cellulitis possible osteomyelitis  Right foot MRI pending  Continue IV cefepime, IV vancomycin for now  Continue aspirin, statin, Plavix  Follow-up with vascular surgery recommendation as well as podiatry following

## 2020-08-29 NOTE — ASSESSMENT & PLAN NOTE
· Diagnosed with thyroid cancer in July  · TSH 0 080  · Free T4 1 39  · Continue home dose of methimazole 5 mg daily  · Recommended close outpatient follow-up

## 2020-08-29 NOTE — PLAN OF CARE
Problem: Potential for Falls  Goal: Patient will remain free of falls  Description: INTERVENTIONS:  - Assess patient frequently for physical needs  -  Identify cognitive and physical deficits and behaviors that affect risk of falls    -  [place] fall precautions as indicated by assessment   - Educate patient/family on patient safety including physical limitations  - Instruct patient to call for assistance with activity based on assessment  - Modify environment to reduce risk of injury  - Consider OT/PT consult to assist with strengthening/mobility  Outcome: Progressing     Problem: PAIN - ADULT  Goal: Verbalizes/displays adequate comfort level or baseline comfort level  Description: Interventions:  - Encourage patient to monitor pain and request assistance  - Assess pain using appropriate pain scale  - Administer analgesics based on type and severity of pain and evaluate response  - Implement non-pharmacological measures as appropriate and evaluate response  - Consider cultural and social influences on pain and pain management  - Notify physician/advanced practitioner if interventions unsuccessful or patient reports new pain  Outcome: Progressing     Problem: INFECTION - ADULT  Goal: Absence or prevention of progression during hospitalization  Description: INTERVENTIONS:  - Assess and monitor for signs and symptoms of infection  - Monitor lab/diagnostic results  - Monitor all insertion sites, i e  indwelling lines, tubes, and drains  - Monitor endotracheal if appropriate and nasal secretions for changes in amount and color  - [place] appropriate cooling/warming therapies per order  - Administer medications as ordered  - Instruct and encourage patient and family to use good hand hygiene technique  - Identify and instruct in appropriate isolation precautions for identified infection/condition  Outcome: Progressing  Goal: Absence of fever/infection during neutropenic period  Description: INTERVENTIONS:  - Monitor WBC    Outcome: Progressing     Problem: SAFETY ADULT  Goal: Patient will remain free of falls  Description: INTERVENTIONS:  - Assess patient frequently for physical needs  -  Identify cognitive and physical deficits and behaviors that affect risk of falls    -  [place] fall precautions as indicated by assessment   - Educate patient/family on patient safety including physical limitations  - Instruct patient to call for assistance with activity based on assessment  - Modify environment to reduce risk of injury  - Consider OT/PT consult to assist with strengthening/mobility  Outcome: Progressing  Goal: Maintain or return to baseline ADL function  Description: INTERVENTIONS:  -  Assess patient's ability to carry out ADLs; assess patient's baseline for ADL function and identify physical deficits which impact ability to perform ADLs (bathing, care of mouth/teeth, toileting, grooming, dressing, etc )  - Assess/evaluate cause of self-care deficits   - Assess range of motion  - Assess patient's mobility; develop plan if impaired  - Assess patient's need for assistive devices and provide as appropriate  - Encourage maximum independence but intervene and supervise when necessary  - Involve family in performance of ADLs  - Assess for home care needs following discharge   - Consider OT consult to assist with ADL evaluation and planning for discharge  - Provide patient education as appropriate  Outcome: Progressing  Goal: Maintain or return mobility status to optimal level  Description: INTERVENTIONS:  - Assess patient's baseline mobility status (ambulation, transfers, stairs, etc )    - Identify cognitive and physical deficits and behaviors that affect mobility  - Identify mobility aids required to assist with transfers and/or ambulation (gait belt, sit-to-stand, lift, walker, cane, etc )  - [place] fall precautions as indicated by assessment  - Record patient progress and toleration of activity level on Mobility SBAR; progress patient to next Phase/Stage  - Instruct patient to call for assistance with activity based on assessment  - Consider rehabilitation consult to assist with strengthening/weightbearing, etc   Outcome: Progressing     Problem: DISCHARGE PLANNING  Goal: Discharge to home or other facility with appropriate resources  Description: INTERVENTIONS:  - Identify barriers to discharge w/patient and caregiver  - Arrange for needed discharge resources and transportation as appropriate  - Identify discharge learning needs (meds, wound care, etc )  - Arrange for interpretive services to assist at discharge as needed  - Refer to Case Management Department for coordinating discharge planning if the patient needs post-hospital services based on physician/advanced practitioner order or complex needs related to functional status, cognitive ability, or social support system  Outcome: Progressing     Problem: Knowledge Deficit  Goal: Patient/family/caregiver demonstrates understanding of disease process, treatment plan, medications, and discharge instructions  Description: Complete learning assessment and assess knowledge base    Interventions:  - Provide teaching at level of understanding  - Provide teaching via preferred learning methods  Outcome: Progressing

## 2020-08-30 PROBLEM — R78.81 STAPHYLOCOCCUS AUREUS BACTEREMIA: Status: ACTIVE | Noted: 2020-08-30

## 2020-08-30 PROBLEM — B95.61 STAPHYLOCOCCUS AUREUS BACTEREMIA: Status: ACTIVE | Noted: 2020-08-30

## 2020-08-30 LAB
ANION GAP SERPL CALCULATED.3IONS-SCNC: 8 MMOL/L (ref 4–13)
BUN SERPL-MCNC: 11 MG/DL (ref 5–25)
CALCIUM SERPL-MCNC: 8.3 MG/DL (ref 8.3–10.1)
CHLORIDE SERPL-SCNC: 105 MMOL/L (ref 100–108)
CO2 SERPL-SCNC: 26 MMOL/L (ref 21–32)
CREAT SERPL-MCNC: 0.89 MG/DL (ref 0.6–1.3)
ERYTHROCYTE [DISTWIDTH] IN BLOOD BY AUTOMATED COUNT: 11.9 % (ref 11.6–15.1)
GFR SERPL CREATININE-BSD FRML MDRD: 95 ML/MIN/1.73SQ M
GLUCOSE SERPL-MCNC: 102 MG/DL (ref 65–140)
GLUCOSE SERPL-MCNC: 110 MG/DL (ref 65–140)
GLUCOSE SERPL-MCNC: 115 MG/DL (ref 65–140)
GLUCOSE SERPL-MCNC: 124 MG/DL (ref 65–140)
GLUCOSE SERPL-MCNC: 160 MG/DL (ref 65–140)
GLUCOSE SERPL-MCNC: 247 MG/DL (ref 65–140)
HCT VFR BLD AUTO: 33.4 % (ref 36.5–49.3)
HGB BLD-MCNC: 10.8 G/DL (ref 12–17)
MCH RBC QN AUTO: 30 PG (ref 26.8–34.3)
MCHC RBC AUTO-ENTMCNC: 32.3 G/DL (ref 31.4–37.4)
MCV RBC AUTO: 93 FL (ref 82–98)
PLATELET # BLD AUTO: 327 THOUSANDS/UL (ref 149–390)
PMV BLD AUTO: 10.2 FL (ref 8.9–12.7)
POTASSIUM SERPL-SCNC: 4.5 MMOL/L (ref 3.5–5.3)
RBC # BLD AUTO: 3.6 MILLION/UL (ref 3.88–5.62)
SODIUM SERPL-SCNC: 139 MMOL/L (ref 136–145)
VANCOMYCIN TROUGH SERPL-MCNC: 14.2 UG/ML (ref 10–20)
WBC # BLD AUTO: 7.81 THOUSAND/UL (ref 4.31–10.16)

## 2020-08-30 PROCEDURE — 87205 SMEAR GRAM STAIN: CPT | Performed by: PODIATRIST

## 2020-08-30 PROCEDURE — 80202 ASSAY OF VANCOMYCIN: CPT | Performed by: PHYSICIAN ASSISTANT

## 2020-08-30 PROCEDURE — 82948 REAGENT STRIP/BLOOD GLUCOSE: CPT

## 2020-08-30 PROCEDURE — 87147 CULTURE TYPE IMMUNOLOGIC: CPT | Performed by: PODIATRIST

## 2020-08-30 PROCEDURE — 85027 COMPLETE CBC AUTOMATED: CPT | Performed by: PHYSICIAN ASSISTANT

## 2020-08-30 PROCEDURE — 87186 SC STD MICRODIL/AGAR DIL: CPT | Performed by: PODIATRIST

## 2020-08-30 PROCEDURE — 87070 CULTURE OTHR SPECIMN AEROBIC: CPT | Performed by: PODIATRIST

## 2020-08-30 PROCEDURE — 87077 CULTURE AEROBIC IDENTIFY: CPT | Performed by: PODIATRIST

## 2020-08-30 PROCEDURE — 99232 SBSQ HOSP IP/OBS MODERATE 35: CPT | Performed by: STUDENT IN AN ORGANIZED HEALTH CARE EDUCATION/TRAINING PROGRAM

## 2020-08-30 PROCEDURE — 80048 BASIC METABOLIC PNL TOTAL CA: CPT | Performed by: PHYSICIAN ASSISTANT

## 2020-08-30 RX ORDER — INSULIN GLARGINE 100 [IU]/ML
30 INJECTION, SOLUTION SUBCUTANEOUS
Status: DISCONTINUED | OUTPATIENT
Start: 2020-08-30 | End: 2020-08-31

## 2020-08-30 RX ADMIN — VERAPAMIL HYDROCHLORIDE 240 MG: 120 TABLET, FILM COATED, EXTENDED RELEASE ORAL at 08:21

## 2020-08-30 RX ADMIN — METHIMAZOLE 5 MG: 5 TABLET ORAL at 08:21

## 2020-08-30 RX ADMIN — INSULIN LISPRO 1 UNITS: 100 INJECTION, SOLUTION INTRAVENOUS; SUBCUTANEOUS at 16:42

## 2020-08-30 RX ADMIN — INSULIN GLARGINE 30 UNITS: 100 INJECTION, SOLUTION SUBCUTANEOUS at 22:17

## 2020-08-30 RX ADMIN — VANCOMYCIN HYDROCHLORIDE 1500 MG: 1 INJECTION, POWDER, LYOPHILIZED, FOR SOLUTION INTRAVENOUS at 14:06

## 2020-08-30 RX ADMIN — ATORVASTATIN CALCIUM 40 MG: 40 TABLET, FILM COATED ORAL at 16:41

## 2020-08-30 RX ADMIN — VANCOMYCIN HYDROCHLORIDE 1500 MG: 1 INJECTION, POWDER, LYOPHILIZED, FOR SOLUTION INTRAVENOUS at 22:17

## 2020-08-30 RX ADMIN — INSULIN LISPRO 2 UNITS: 100 INJECTION, SOLUTION INTRAVENOUS; SUBCUTANEOUS at 22:16

## 2020-08-30 RX ADMIN — CLOPIDOGREL BISULFATE 75 MG: 75 TABLET ORAL at 08:21

## 2020-08-30 RX ADMIN — ENOXAPARIN SODIUM 40 MG: 40 INJECTION SUBCUTANEOUS at 08:21

## 2020-08-30 RX ADMIN — VANCOMYCIN HYDROCHLORIDE 2000 MG: 1 INJECTION, POWDER, LYOPHILIZED, FOR SOLUTION INTRAVENOUS at 04:50

## 2020-08-30 RX ADMIN — CEFEPIME HYDROCHLORIDE 2000 MG: 2 INJECTION, POWDER, FOR SOLUTION INTRAVENOUS at 17:43

## 2020-08-30 RX ADMIN — ASPIRIN 81 MG 81 MG: 81 TABLET ORAL at 08:21

## 2020-08-30 RX ADMIN — CEFEPIME HYDROCHLORIDE 2000 MG: 2 INJECTION, POWDER, FOR SOLUTION INTRAVENOUS at 08:22

## 2020-08-30 NOTE — ASSESSMENT & PLAN NOTE
2/2 culture from 08/28/2020 growing Staph aureus  Likely source foot wound  Continue IV vancomycin for now  Vascular surgery, Podiatry following  Follow-up with ID consult  Pharmacy consult for vancomycin management

## 2020-08-30 NOTE — PROGRESS NOTES
Vancomycin IV Pharmacy-to-Dose Consultation    Garland Moffett is a 60 y o  male who is currently receiving Vancomycin IV with management by the Pharmacy Consult service  Assessment/Plan:  The patient was reviewed  Renal function is stable and no signs or symptoms of nephrotoxicity and/or infusion reactions were documented in the chart  Based on todays assessment, continue current vancomycin (day # 4) dosing of 2000 q 12, with a plan for trough to be drawn at 1330 on 8/31  We will continue to follow the patients culture results and clinical progress daily      Alise Tobin, Pharmacist

## 2020-08-30 NOTE — PROGRESS NOTES
Progress Note - Livia Wright 1965, 60 y o  male MRN: [id number]    Unit/Bed#: -01 Encounter: [id number]    Primary Care Provider: Elham Pavon MD   Date and time admitted to hospital: 8/28/2020  3:14 PM        * Wound infection  Assessment & Plan  Recent history of right hallux amputation  Presented with wound dehiscence, wound cellulitis, possible osteomyelitis  Currently afebrile, no leukocytosis noted  2/2 blood culture from 08/28/2020 growing Staph aureus  Fortunately acutely nontoxic appearing  Continue IV vancomycin and IV cefepime  Continue with vascular surgery recommendation  Podiatry following as well  Encourage for strict nonweightbearing to right lower extremity at this time  Continue dressing changes per podiatrist recommendation  Patient is agreeable with above plan  Staphylococcus aureus bacteremia  Assessment & Plan  2/2 culture from 08/28/2020 growing Staph aureus  Likely source foot wound  Continue IV vancomycin for now  Vascular surgery, Podiatry following  Follow-up with ID consult  Pharmacy consult for vancomycin management  PAD (peripheral artery disease) [facility])  Assessment & Plan  Patient history of recent right popliteal PTA/ stent 08/06/2020 and right hallux amputation  Now presents with nonhealing surgical site, wound dehiscence, wound cellulitis possible osteomyelitis  Right foot MRI pending  Continue IV cefepime, IV vancomycin for now  Continue aspirin, statin, Plavix  Follow-up with vascular surgery recommendation as well as podiatry following  Type 2 diabetes mellitus with diabetic polyneuropathy, with long-term current use of insulin [facility])  Assessment & Plan  Lab Results   Component Value Date    HGBA1C 8 1 (H) 08/28/2020       Recent Labs     08/29/20  1609 08/29/20  2117 08/30/20  0742 08/30/20  0838   POCGLU 184* 213* 102 110       Blood Sugar Average: Last 72 hrs:  · (P) 144 5     Uncontrolled    Hemoglobin A1c 8 1   · Home medication includes lantus 80u at night as per home dose  · Home med includes short-acting 40 units with meals  · Will decrease insulin coverage due to hypoglycemic episodes  · Continue Lantus 30 units q h s , meal coverage 8 units t i d   · Continue diabetic diet  · Continue with sliding scale coverage  Amputation of right great toe [facility])  Assessment & Plan  Now presented again with poor wound healing, wound dehiscence, possible cellulitis, osteomyelitis  Plan is as stated above under 1  And 2  Hyperthyroidism  Assessment & Plan  · Diagnosed with thyroid cancer in July  · TSH 0 080  · Free T4 1 39  · Continue home dose of methimazole 5 mg daily  · Recommended close outpatient follow-up  Hyperlipidemia  Assessment & Plan  · Continue statin    Gastroesophageal reflux disease without esophagitis  Assessment & Plan  · Continue PPI    Hypertension, essential  Assessment & Plan  · Controlled  · Continue anti-HTN meds  · Monitor per unit          VTE Pharmacologic Prophylaxis:   Pharmacologic: Enoxaparin (Lovenox)    Patient Centered Rounds: I have performed bedside rounds with nursing staff today  Time Spent for Care: 30 minutes  More than 50% of total time spent on counseling and coordination of care as described above  Current Length of Stay: 2 day(s)    Current Patient Status: Inpatient   Certification Statement: The patient will continue to require additional inpatient hospital stay due to above  Discharge Plan: TBD    Code Status: Level 1 - Full Code      Subjective:   2/2 blood culture from 2020 growing Staph aureus likely source for infection  Currently patient is afebrile, fortunately hemodynamically stable and acutely nontoxic appearing  Tolerating IV antibiotics well  Denies chest pain, dyspnea, fever, chills, nausea, vomiting, diarrhea, any other new complaints  No other events reported      Objective:     Vitals:   Temp (24hrs), Av 8 °F (36 6 °C), Min:97 6 °F (36 4 °C), Max:97 9 °F (36 6 °C)    Temp:  [97 6 °F (36 4 °C)-97 9 °F (36 6 °C)] 97 6 °F (36 4 °C)  HR:  [73] 73  Resp:  [16] 16  BP: (112-137)/(75-87) 137/87  SpO2:  [97 %] 97 %  Body mass index is 30 89 kg/m²  Input and Output Summary (last 24 hours): Intake/Output Summary (Last 24 hours) at 8/30/2020 1110  Last data filed at 8/30/2020 0744  Gross per 24 hour   Intake 1448 33 ml   Output 1700 ml   Net -251 67 ml       Physical Exam:     Physical Exam  Constitutional:       General: He is not in acute distress  Appearance: Normal appearance  He is not ill-appearing  Comments: Chronically ill, acutely nontoxic appearing  HENT:      Head: Normocephalic and atraumatic  Nose: No rhinorrhea  Mouth/Throat:      Pharynx: No oropharyngeal exudate or posterior oropharyngeal erythema  Eyes:      Extraocular Movements: Extraocular movements intact  Conjunctiva/sclera: Conjunctivae normal       Pupils: Pupils are equal, round, and reactive to light  Neck:      Musculoskeletal: Normal range of motion and neck supple  No neck rigidity  Cardiovascular:      Rate and Rhythm: Normal rate and regular rhythm  Pulses: Normal pulses  Heart sounds: Normal heart sounds  Pulmonary:      Effort: Pulmonary effort is normal  No respiratory distress  Breath sounds: Normal breath sounds  No stridor  No wheezing or rhonchi  Abdominal:      General: Bowel sounds are normal  There is no distension  Palpations: Abdomen is soft  Tenderness: There is no abdominal tenderness  Musculoskeletal: Normal range of motion  General: No swelling  Comments: Right lower extremity dressing noted clean, dry, intact  Skin:     Findings: No rash  Neurological:      General: No focal deficit present  Mental Status: He is alert and oriented to person, place, and time  Mental status is at baseline     Psychiatric:         Mood and Affect: Mood normal          Behavior: Behavior normal  Additional Data:     Labs:    Results from last 7 days   Lab Units 08/30/20  0407  08/28/20  1558   WBC Thousand/uL 7 81   < > 8 93   HEMOGLOBIN g/dL 10 8*   < > 12 3   HEMATOCRIT % 33 4*   < > 36 4*   PLATELETS Thousands/uL 327   < > 366   NEUTROS PCT %  --   --  58   LYMPHS PCT %  --   --  26   MONOS PCT %  --   --  12   EOS PCT %  --   --  3    < > = values in this interval not displayed  Results from last 7 days   Lab Units 08/30/20  0407  08/28/20  1558   SODIUM mmol/L 139   < > 133*   POTASSIUM mmol/L 4 5   < > 4 2   CHLORIDE mmol/L 105   < > 96*   CO2 mmol/L 26   < > 26   BUN mg/dL 11   < > 19   CREATININE mg/dL 0 89   < > 0 94   ANION GAP mmol/L 8   < > 11   CALCIUM mg/dL 8 3   < > 9 1   ALBUMIN g/dL  --   --  3 0*   TOTAL BILIRUBIN mg/dL  --   --  0 50   ALK PHOS U/L  --   --  97   ALT U/L  --   --  44   AST U/L  --   --  50*   GLUCOSE RANDOM mg/dL 124   < > 182*    < > = values in this interval not displayed  Results from last 7 days   Lab Units 08/30/20  0838 08/30/20  0742 08/29/20  2117 08/29/20  1609 08/29/20  1042 08/29/20  0801 08/28/20  2058 08/28/20  1707   POC GLUCOSE mg/dl 110 102 213* 184* 174* 76 126 171*     Results from last 7 days   Lab Units 08/28/20  1558   HEMOGLOBIN A1C % 8 1*     Results from last 7 days   Lab Units 08/28/20  1738 08/28/20  1558   LACTIC ACID mmol/L 1 3 2 2*           * I Have Reviewed All Lab Data Listed Above  * Additional Pertinent Lab Tests Reviewed:  All Labs Within Last 24 Hours Reviewed        Recent Cultures (last 7 days):     Results from last 7 days   Lab Units 08/28/20  1558   BLOOD CULTURE  Staphylococcus aureus*  Staphylococcus aureus*   GRAM STAIN RESULT  Gram positive cocci in clusters*  Gram positive cocci in clusters*       Last 24 Hours Medication List:   Current Facility-Administered Medications   Medication Dose Route Frequency Provider Last Rate    acetaminophen  650 mg Oral Q6H PRN Natalie SprayGABRIEL      aluminum-magnesium hydroxide-simethicone  30 mL Oral Q6H PRN Selena Ahuja PA-C      aspirin  81 mg Oral Daily Helen Mendoza PA-C      atorvastatin  40 mg Oral Daily With Connect[employer] TechnologiesGABRIEL      cefepime  2,000 mg Intravenous Q8H Selena Ahuja PA-C 2,000 mg (08/30/20 8630)    clopidogrel  75 mg Oral Daily Helen Mendoza PA-C      enoxaparin  40 mg Subcutaneous Q24H [address] Serafin YAN MD      insulin glargine  30 Units Subcutaneous HS Serafin YAN MD      insulin lispro  1-5 Units Subcutaneous 4x Daily (AC & HS) Helen Mendoza PA-C      insulin lispro  8 Units Subcutaneous TID With Meals Carissa Martin MD      LORazepam  0 5 mg Oral BID PRN Selena Ahuja PA-C      methimazole  5 mg Oral Daily Helen Mendoza PA-C      ondansetron  4 mg Intravenous Q6H PRN Helen Mendoza PA-C      pantoprazole  20 mg Oral Early Morning Helen Mendoza PA-C      polyethylene glycol  17 g Oral Daily PRN Selena Ahuja PA-C      sodium chloride  100 mL/hr Intravenous Continuous Winifred YAN  mL/hr (08/29/20 0430)    vancomycin  15 mg/kg (Adjusted) Intravenous Q8H Helen Mendoza PA-C      verapamil  240 mg Oral Daily Helen Mendoza PA-C      zolpidem  10 mg Oral HS PRN Selena Ahuja PA-C          Today, Patient Was Seen By: Helga Thacker MD    ** Please Note: Dictation voice to text software may have been used in the creation of this document   **

## 2020-08-30 NOTE — ASSESSMENT & PLAN NOTE
Recent history of right hallux amputation  Presented with wound dehiscence, wound cellulitis, possible osteomyelitis  Currently afebrile, no leukocytosis noted  2/2 blood culture from 08/28/2020 growing Staph aureus  Fortunately acutely nontoxic appearing  Continue IV vancomycin and IV cefepime  Continue with vascular surgery recommendation  Podiatry following as well  Encourage for strict nonweightbearing to right lower extremity at this time  Continue dressing changes per podiatrist recommendation  Patient is agreeable with above plan

## 2020-08-30 NOTE — ASSESSMENT & PLAN NOTE
Lab Results   Component Value Date    HGBA1C 8 1 (H) 08/28/2020       Recent Labs     08/29/20  1609 08/29/20  2117 08/30/20  0742 08/30/20  0838   POCGLU 184* 213* 102 110       Blood Sugar Average: Last 72 hrs:  · (P) 144 5     Uncontrolled  Hemoglobin A1c 8 1   · Home medication includes lantus 80u at night as per home dose  · Home med includes short-acting 40 units with meals  · Will decrease insulin coverage due to hypoglycemic episodes  · Continue Lantus 30 units q h s , meal coverage 8 units t i d   · Continue diabetic diet  · Continue with sliding scale coverage

## 2020-08-30 NOTE — CONSULTS
Consultation - Mckinley Stoner 60 y o  male MRN: [id number]    Unit/Bed#: -18 Encounter: [id number]      Assessment/Plan     Assessment:  1) Osteomyelitis of the right foot first metatarsal  2) PAD  3) Nonhealing ulcer to bone right foot first digit amputation site  4) Cellulitis to the right foot     Plan:  1) Wound culture is taken and sent for cultures  2) Dressed with betadine and DSD  3) Nonweightbearing right foot  4) Await MRI read  5) Await Vascular testing  6) Patient will need further resection of the first ray pending MRI results and Vascular results- will plan for possible Tuesday  7) Ordered PT and knee scooter for nonweightbearing training  8) ID consult  9) IV antibiotics as per Medicine and ID  10) Will follow     History of Present Illness     HPI: Mckinley Stoner is a 60y o  year old male who presents with nonhealing ulcer to the right foot at the distal first digit amputation site  Patient was on keflex and doxycycline outpatient  Outpatient cultures positive for MRSA  He presented to clinic with worsening wound and infection Friday and was sent to the hospital for admission        Consults    Review of Systems  No n/v/f/c/s  No hallucinations  No rashes  No bruising  No CP  No SOB  No GERD  No dysuria  No headache  No sore throat  No earache  No diplopia    Historical Information   Past Medical History:   Diagnosis Date    Diabetes mellitus ([facility] [address] )     Hyperlipidemia     Hypertension     Thyroid cancer [facility])      Past Surgical History:   Procedure Laterality Date    FOOT SURGERY Right 2014    IR AORTAGRAM WITH RUN-OFF  8/6/2020    TOE AMPUTATION Right 8/11/2020    Procedure: AMPUTATION TOE;  Surgeon: Yomi Lyle DPM;  Location: [facility] OR;  Service: Podiatry   [place] [address] THYROID BIOPSY  7/2/2020     Social History   Social History     Substance and Sexual Activity   Alcohol Use Not Currently    Alcohol/week: 8 0 standard drinks    Types: 8 Cans of beer per week    Comment: last drink was 10 days ago     Social History     Substance and Sexual Activity   Drug Use No    Types: Marijuana     Social History     Tobacco Use   Smoking Status Never Smoker   Smokeless Tobacco Never Used     E-Cigarette/Vaping    E-Cigarette Use Never User      E-Cigarette/Vaping Substances      Family History:   Family History   Problem Relation Age of Onset    Cancer Mother     Hypertension Father        Meds/Allergies   all current active meds have been reviewed, current meds:   Current Facility-Administered Medications   Medication Dose Route Frequency    acetaminophen (TYLENOL) tablet 650 mg  650 mg Oral Q6H PRN    aluminum-magnesium hydroxide-simethicone (MYLANTA) 200-200-20 mg/5 mL oral suspension 30 mL  30 mL Oral Q6H PRN    aspirin chewable tablet 81 mg  81 mg Oral Daily    atorvastatin (LIPITOR) tablet 40 mg  40 mg Oral Daily With Dinner    cefepime (MAXIPIME) 2,000 mg in dextrose 5 % 50 mL IVPB  2,000 mg Intravenous Q8H    clopidogrel (PLAVIX) tablet 75 mg  75 mg Oral Daily    enoxaparin (LOVENOX) subcutaneous injection 40 mg  40 mg Subcutaneous Q24H TESS    insulin glargine (LANTUS) subcutaneous injection 30 Units 0 3 mL  30 Units Subcutaneous HS    insulin lispro (HumaLOG) 100 units/mL subcutaneous injection 1-5 Units  1-5 Units Subcutaneous 4x Daily (AC & HS)    insulin lispro (HumaLOG) 100 units/mL subcutaneous injection 8 Units  8 Units Subcutaneous TID With Meals    LORazepam (ATIVAN) tablet 0 5 mg  0 5 mg Oral BID PRN    methimazole (TAPAZOLE) tablet 5 mg  5 mg Oral Daily    ondansetron (ZOFRAN) injection 4 mg  4 mg Intravenous Q6H PRN    pantoprazole (PROTONIX) EC tablet 20 mg  20 mg Oral Early Morning    polyethylene glycol (MIRALAX) packet 17 g  17 g Oral Daily PRN    sodium chloride 0 9 % infusion  100 mL/hr Intravenous Continuous    vancomycin (VANCOCIN) 1,500 mg in sodium chloride 0 9 % 250 mL IVPB  15 mg/kg (Adjusted) Intravenous Q8H    verapamil (CALAN-SR) CR tablet 240 mg  240 mg Oral Daily    zolpidem (AMBIEN) tablet 10 mg  10 mg Oral HS PRN    and PTA meds:   Prior to Admission Medications   Prescriptions Last Dose Informant Patient Reported? Taking? AVALIDE 300-12 5 MG per tablet 2020 at Unknown time  No Yes   Sig: Take 1 tablet by mouth daily   BD PEN NEEDLE DEV U/F 32G X 4 MM MISC   No No   Sig: Inject under the skin 4 (four) times a day   Blood Glucose Monitoring Suppl (ONE TOUCH ULTRA 2) w/Device KIT  Self No No   Sig: by Does not apply route 2 (two) times a day   aspirin 81 mg chewable tablet 2020 at Unknown time  No Yes   Sig: Chew 1 tablet (81 mg total) daily   atorvastatin (LIPITOR) 40 mg tablet 2020 at Unknown time  No Yes   Sig: Take 1 tablet (40 mg total) by mouth daily with dinner   clopidogrel (PLAVIX) 75 mg tablet 2020 at Unknown time  No Yes   Sig: Take 1 tablet (75 mg total) by mouth daily   glucose blood test strip   No No   Sig: Check blood sugar daily   insulin glargine (Lantus SoloStar) 100 units/mL injection pen 2020 at Unknown time  No Yes   Si u bedtime   insulin glulisine (Apidra SoloStar) 100 units/mL injection pen 2020 at Unknown time  No Yes   Sig: Inject 40 Units under the skin 3 (three) times a day with meals   methimazole (TAPAZOLE) 5 mg tablet 2020 at Unknown time  Yes Yes   Sig: take 1 tablet by mouth once daily (STOP MEDICATION AND CALL DOCTO     (REFER TO PRESCRIPTION NOTES)     omeprazole (PriLOSEC) 40 MG capsule 2020 at Unknown time  No Yes   Sig: TAKE 1 CAPSULE DAILY   verapamil (VERELAN PM) 360 MG 24 hr capsule 2020 at Unknown time Self Yes Yes   Sig: Take 360 mg by mouth daily    zolpidem (AMBIEN) 10 mg tablet Past Month at Unknown time  No Yes   Sig: Take 1 tablet (10 mg total) by mouth daily at bedtime as needed for sleep      Facility-Administered Medications: None     No Known Allergies    Objective   Vitals: Blood pressure 137/87, pulse 73, temperature 97 6 °F (36 4 °C), resp  rate 16, height 6' 4" (1 93 m), weight 115 kg (253 lb 12 oz), SpO2 97 %      Intake/Output Summary (Last 24 hours) at 8/30/2020 0955  Last data filed at 8/30/2020 0744  Gross per 24 hour   Intake 1448 33 ml   Output 1700 ml   Net -251 67 ml     Invasive Devices     Peripheral Intravenous Line            Peripheral IV 08/28/20 Right Arm 1 day                Physical Exam    Patient is awake alert and oriented x 3  Neurological: No babinski, no POP, no sensation  Orthopedic: Previous amputation to the right great toe and partial resection of the right fifth metatarsal  Vascular: nonpalpable pulses, no pedal hair, thin shiny skin  Dermatological: Open lesion to the right foot first digit amp site 3 cm x 6 cm x 0 5 cm, probes to bone, slough and eschar, positive malodor, positive purulence, erythema to the right midfoot

## 2020-08-30 NOTE — PROGRESS NOTES
Vancomycin Assessment    Thom Hollis is a 60 y o  male who is currently receiving vancomycin 2000 mg iv q 12 hours for skin-soft tissue infection, MRSA suspected bone/joint   Relevant clinical data and objective history reviewed:  Creatinine   Date Value Ref Range Status   08/30/2020 0 89 0 60 - 1 30 mg/dL Final     Comment:     Standardized to IDMS reference method   08/29/2020 0 93 0 60 - 1 30 mg/dL Final     Comment:     Standardized to IDMS reference method   08/28/2020 0 94 0 60 - 1 30 mg/dL Final     Comment:     Standardized to IDMS reference method     /75   Pulse 73   Temp 97 9 °F (36 6 °C)   Resp 16   Ht 6' 4" (1 93 m)   Wt 115 kg (253 lb 12 oz)   SpO2 97%   BMI 30 89 kg/m²   I/O last 3 completed shifts: In: 3464 3 [P O :846; I V :1568 3; IV Piggyback:1050]  Out: 725 [Urine:725]  Lab Results   Component Value Date/Time    BUN 11 08/30/2020 04:07 AM    WBC 7 81 08/30/2020 04:07 AM    HGB 10 8 (L) 08/30/2020 04:07 AM    HCT 33 4 (L) 08/30/2020 04:07 AM    MCV 93 08/30/2020 04:07 AM     08/30/2020 04:07 AM     Temp Readings from Last 3 Encounters:   08/29/20 97 9 °F (36 6 °C)   08/20/20 (!) 97 3 °F (36 3 °C) (Tympanic)   08/12/20 98 5 °F (36 9 °C)     Vancomycin Days of Therapy: 3    Assessment/Plan  The patient is currently on vancomycin utilizing scheduled dosing  Baseline risks associated with therapy include: concomitant nephrotoxic medications and dehydration  The patient is receiving 2000 mg iv q 12 hours with the most recent vancomycin level being at steady-state and sub-therapeutic (14 2) based on a goal of 15-20 (appropriate for most indications) ; therefore, after clinical evaluation will be changed to 1500 mg iv q 8 [facility]   Pharmacy will continue to follow closely for s/sx of nephrotoxicity, infusion reactions, and appropriateness of therapy  BMP and CBC will be ordered per protocol    Plan for trough as patient approaches steady state, prior to the 4th  dose at approximately 13:30 on 8/31/2020  Pharmacy will continue to follow the patients culture results and clinical progress daily      Jey Schaffer, Pharmacist

## 2020-08-30 NOTE — PLAN OF CARE
Problem: Potential for Falls  Goal: Patient will remain free of falls  Description: INTERVENTIONS:  - Assess patient frequently for physical needs  -  Identify cognitive and physical deficits and behaviors that affect risk of falls    -  [place] fall precautions as indicated by assessment   - Educate patient/family on patient safety including physical limitations  - Instruct patient to call for assistance with activity based on assessment  - Modify environment to reduce risk of injury  - Consider OT/PT consult to assist with strengthening/mobility  Outcome: Progressing     Problem: PAIN - ADULT  Goal: Verbalizes/displays adequate comfort level or baseline comfort level  Description: Interventions:  - Encourage patient to monitor pain and request assistance  - Assess pain using appropriate pain scale  - Administer analgesics based on type and severity of pain and evaluate response  - Implement non-pharmacological measures as appropriate and evaluate response  - Consider cultural and social influences on pain and pain management  - Notify physician/advanced practitioner if interventions unsuccessful or patient reports new pain  Outcome: Progressing     Problem: INFECTION - ADULT  Goal: Absence or prevention of progression during hospitalization  Description: INTERVENTIONS:  - Assess and monitor for signs and symptoms of infection  - Monitor lab/diagnostic results  - Monitor all insertion sites, i e  indwelling lines, tubes, and drains  - Monitor endotracheal if appropriate and nasal secretions for changes in amount and color  - [place] appropriate cooling/warming therapies per order  - Administer medications as ordered  - Instruct and encourage patient and family to use good hand hygiene technique  - Identify and instruct in appropriate isolation precautions for identified infection/condition  Outcome: Progressing  Goal: Absence of fever/infection during neutropenic period  Description: INTERVENTIONS:  - Monitor WBC    Outcome: Progressing     Problem: SAFETY ADULT  Goal: Patient will remain free of falls  Description: INTERVENTIONS:  - Assess patient frequently for physical needs  -  Identify cognitive and physical deficits and behaviors that affect risk of falls    -  [place] fall precautions as indicated by assessment   - Educate patient/family on patient safety including physical limitations  - Instruct patient to call for assistance with activity based on assessment  - Modify environment to reduce risk of injury  - Consider OT/PT consult to assist with strengthening/mobility  Outcome: Progressing  Goal: Maintain or return to baseline ADL function  Description: INTERVENTIONS:  -  Assess patient's ability to carry out ADLs; assess patient's baseline for ADL function and identify physical deficits which impact ability to perform ADLs (bathing, care of mouth/teeth, toileting, grooming, dressing, etc )  - Assess/evaluate cause of self-care deficits   - Assess range of motion  - Assess patient's mobility; develop plan if impaired  - Assess patient's need for assistive devices and provide as appropriate  - Encourage maximum independence but intervene and supervise when necessary  - Involve family in performance of ADLs  - Assess for home care needs following discharge   - Consider OT consult to assist with ADL evaluation and planning for discharge  - Provide patient education as appropriate  Outcome: Progressing  Goal: Maintain or return mobility status to optimal level  Description: INTERVENTIONS:  - Assess patient's baseline mobility status (ambulation, transfers, stairs, etc )    - Identify cognitive and physical deficits and behaviors that affect mobility  - Identify mobility aids required to assist with transfers and/or ambulation (gait belt, sit-to-stand, lift, walker, cane, etc )  - [place] fall precautions as indicated by assessment  - Record patient progress and toleration of activity level on Mobility SBAR; progress patient to next Phase/Stage  - Instruct patient to call for assistance with activity based on assessment  - Consider rehabilitation consult to assist with strengthening/weightbearing, etc   Outcome: Progressing     Problem: DISCHARGE PLANNING  Goal: Discharge to home or other facility with appropriate resources  Description: INTERVENTIONS:  - Identify barriers to discharge w/patient and caregiver  - Arrange for needed discharge resources and transportation as appropriate  - Identify discharge learning needs (meds, wound care, etc )  - Arrange for interpretive services to assist at discharge as needed  - Refer to Case Management Department for coordinating discharge planning if the patient needs post-hospital services based on physician/advanced practitioner order or complex needs related to functional status, cognitive ability, or social support system  Outcome: Progressing     Problem: Knowledge Deficit  Goal: Patient/family/caregiver demonstrates understanding of disease process, treatment plan, medications, and discharge instructions  Description: Complete learning assessment and assess knowledge base    Interventions:  - Provide teaching at level of understanding  - Provide teaching via preferred learning methods  Outcome: Progressing

## 2020-08-31 ENCOUNTER — APPOINTMENT (INPATIENT)
Dept: VASCULAR ULTRASOUND | Facility: HOSPITAL | Age: 55
DRG: 474 | End: 2020-08-31
Payer: COMMERCIAL

## 2020-08-31 ENCOUNTER — APPOINTMENT (INPATIENT)
Dept: NON INVASIVE DIAGNOSTICS | Facility: HOSPITAL | Age: 55
DRG: 474 | End: 2020-08-31
Payer: COMMERCIAL

## 2020-08-31 PROBLEM — M86.9 OSTEOMYELITIS (HCC): Status: ACTIVE | Noted: 2020-08-28

## 2020-08-31 LAB
ANION GAP SERPL CALCULATED.3IONS-SCNC: 8 MMOL/L (ref 4–13)
BACTERIA BLD CULT: ABNORMAL
BACTERIA BLD CULT: ABNORMAL
BUN SERPL-MCNC: 9 MG/DL (ref 5–25)
CALCIUM SERPL-MCNC: 9.2 MG/DL (ref 8.3–10.1)
CHLORIDE SERPL-SCNC: 101 MMOL/L (ref 100–108)
CO2 SERPL-SCNC: 27 MMOL/L (ref 21–32)
CREAT SERPL-MCNC: 0.82 MG/DL (ref 0.6–1.3)
ERYTHROCYTE [DISTWIDTH] IN BLOOD BY AUTOMATED COUNT: 11.8 % (ref 11.6–15.1)
GFR SERPL CREATININE-BSD FRML MDRD: 98 ML/MIN/1.73SQ M
GLUCOSE SERPL-MCNC: 105 MG/DL (ref 65–140)
GLUCOSE SERPL-MCNC: 113 MG/DL (ref 65–140)
GLUCOSE SERPL-MCNC: 195 MG/DL (ref 65–140)
GLUCOSE SERPL-MCNC: 211 MG/DL (ref 65–140)
GLUCOSE SERPL-MCNC: 243 MG/DL (ref 65–140)
GRAM STN SPEC: ABNORMAL
GRAM STN SPEC: ABNORMAL
HCT VFR BLD AUTO: 37.7 % (ref 36.5–49.3)
HGB BLD-MCNC: 12.5 G/DL (ref 12–17)
MCH RBC QN AUTO: 30.1 PG (ref 26.8–34.3)
MCHC RBC AUTO-ENTMCNC: 33.2 G/DL (ref 31.4–37.4)
MCV RBC AUTO: 91 FL (ref 82–98)
PLATELET # BLD AUTO: 400 THOUSANDS/UL (ref 149–390)
PMV BLD AUTO: 10.2 FL (ref 8.9–12.7)
POTASSIUM SERPL-SCNC: 3.9 MMOL/L (ref 3.5–5.3)
RBC # BLD AUTO: 4.15 MILLION/UL (ref 3.88–5.62)
SODIUM SERPL-SCNC: 136 MMOL/L (ref 136–145)
VANCOMYCIN TROUGH SERPL-MCNC: 21.3 UG/ML (ref 10–20)
WBC # BLD AUTO: 7.89 THOUSAND/UL (ref 4.31–10.16)

## 2020-08-31 PROCEDURE — 80202 ASSAY OF VANCOMYCIN: CPT | Performed by: PHYSICIAN ASSISTANT

## 2020-08-31 PROCEDURE — 97163 PT EVAL HIGH COMPLEX 45 MIN: CPT

## 2020-08-31 PROCEDURE — 87040 BLOOD CULTURE FOR BACTERIA: CPT | Performed by: STUDENT IN AN ORGANIZED HEALTH CARE EDUCATION/TRAINING PROGRAM

## 2020-08-31 PROCEDURE — 93306 TTE W/DOPPLER COMPLETE: CPT

## 2020-08-31 PROCEDURE — 82948 REAGENT STRIP/BLOOD GLUCOSE: CPT

## 2020-08-31 PROCEDURE — 93926 LOWER EXTREMITY STUDY: CPT

## 2020-08-31 PROCEDURE — 80048 BASIC METABOLIC PNL TOTAL CA: CPT | Performed by: PHYSICIAN ASSISTANT

## 2020-08-31 PROCEDURE — 85027 COMPLETE CBC AUTOMATED: CPT | Performed by: PHYSICIAN ASSISTANT

## 2020-08-31 PROCEDURE — 99255 IP/OBS CONSLTJ NEW/EST HI 80: CPT | Performed by: INTERNAL MEDICINE

## 2020-08-31 PROCEDURE — 99232 SBSQ HOSP IP/OBS MODERATE 35: CPT | Performed by: STUDENT IN AN ORGANIZED HEALTH CARE EDUCATION/TRAINING PROGRAM

## 2020-08-31 PROCEDURE — 93306 TTE W/DOPPLER COMPLETE: CPT | Performed by: INTERNAL MEDICINE

## 2020-08-31 PROCEDURE — 99232 SBSQ HOSP IP/OBS MODERATE 35: CPT | Performed by: SURGERY

## 2020-08-31 PROCEDURE — 97167 OT EVAL HIGH COMPLEX 60 MIN: CPT

## 2020-08-31 RX ORDER — INSULIN GLARGINE 100 [IU]/ML
35 INJECTION, SOLUTION SUBCUTANEOUS
Status: DISCONTINUED | OUTPATIENT
Start: 2020-08-31 | End: 2020-09-02

## 2020-08-31 RX ADMIN — INSULIN GLARGINE 35 UNITS: 100 INJECTION, SOLUTION SUBCUTANEOUS at 21:41

## 2020-08-31 RX ADMIN — PERFLUTREN 0.4 ML/MIN: 6.52 INJECTION, SUSPENSION INTRAVENOUS at 08:15

## 2020-08-31 RX ADMIN — INSULIN LISPRO 2 UNITS: 100 INJECTION, SOLUTION INTRAVENOUS; SUBCUTANEOUS at 16:44

## 2020-08-31 RX ADMIN — ENOXAPARIN SODIUM 40 MG: 40 INJECTION SUBCUTANEOUS at 08:01

## 2020-08-31 RX ADMIN — METHIMAZOLE 5 MG: 5 TABLET ORAL at 08:01

## 2020-08-31 RX ADMIN — ASPIRIN 81 MG 81 MG: 81 TABLET ORAL at 08:01

## 2020-08-31 RX ADMIN — ATORVASTATIN CALCIUM 40 MG: 40 TABLET, FILM COATED ORAL at 16:43

## 2020-08-31 RX ADMIN — CEFEPIME HYDROCHLORIDE 2000 MG: 2 INJECTION, POWDER, FOR SOLUTION INTRAVENOUS at 00:12

## 2020-08-31 RX ADMIN — SODIUM CHLORIDE 100 ML/HR: 0.9 INJECTION, SOLUTION INTRAVENOUS at 00:44

## 2020-08-31 RX ADMIN — CEFEPIME HYDROCHLORIDE 2000 MG: 2 INJECTION, POWDER, FOR SOLUTION INTRAVENOUS at 10:22

## 2020-08-31 RX ADMIN — INSULIN LISPRO 2 UNITS: 100 INJECTION, SOLUTION INTRAVENOUS; SUBCUTANEOUS at 21:42

## 2020-08-31 RX ADMIN — INSULIN LISPRO 1 UNITS: 100 INJECTION, SOLUTION INTRAVENOUS; SUBCUTANEOUS at 11:46

## 2020-08-31 RX ADMIN — ZOLPIDEM TARTRATE 10 MG: 5 TABLET, COATED ORAL at 23:22

## 2020-08-31 RX ADMIN — VERAPAMIL HYDROCHLORIDE 240 MG: 120 TABLET, FILM COATED, EXTENDED RELEASE ORAL at 08:01

## 2020-08-31 RX ADMIN — VANCOMYCIN HYDROCHLORIDE 2000 MG: 1 INJECTION, POWDER, LYOPHILIZED, FOR SOLUTION INTRAVENOUS at 17:42

## 2020-08-31 RX ADMIN — VANCOMYCIN HYDROCHLORIDE 1500 MG: 1 INJECTION, POWDER, LYOPHILIZED, FOR SOLUTION INTRAVENOUS at 06:09

## 2020-08-31 RX ADMIN — CLOPIDOGREL BISULFATE 75 MG: 75 TABLET ORAL at 08:01

## 2020-08-31 RX ADMIN — SODIUM CHLORIDE 100 ML/HR: 0.9 INJECTION, SOLUTION INTRAVENOUS at 10:29

## 2020-08-31 NOTE — PROGRESS NOTES
Vancomycin Assessment    Margarita Bustos is a 60 y o  male who is currently receiving vancomycin 1500 mg IV q 8 H for MRSA suspected bone/joint   Relevant clinical data and objective history reviewed:  Creatinine   Date Value Ref Range Status   08/31/2020 0 82 0 60 - 1 30 mg/dL Final     Comment:     Standardized to IDMS reference method   08/30/2020 0 89 0 60 - 1 30 mg/dL Final     Comment:     Standardized to IDMS reference method   08/29/2020 0 93 0 60 - 1 30 mg/dL Final     Comment:     Standardized to IDMS reference method     /95   Pulse 69   Temp 98 1 °F (36 7 °C)   Resp 18   Ht 6' 4" (1 93 m)   Wt 115 kg (253 lb 12 oz)   SpO2 96%   BMI 30 89 kg/m²   I/O last 3 completed shifts: In: 2047 7 [P O :616; I V :1431 7]  Out: 2550 [Urine:2550]  Lab Results   Component Value Date/Time    BUN 9 08/31/2020 06:15 AM    WBC 7 89 08/31/2020 06:15 AM    HGB 12 5 08/31/2020 06:15 AM    HCT 37 7 08/31/2020 06:15 AM    MCV 91 08/31/2020 06:15 AM     (H) 08/31/2020 06:15 AM     Temp Readings from Last 3 Encounters:   08/31/20 98 1 °F (36 7 °C)   08/20/20 (!) 97 3 °F (36 3 °C) (Tympanic)   08/12/20 98 5 °F (36 9 °C)     Vancomycin Days of Therapy: 5    Assessment/Plan  The patient is currently on vancomycin utilizing scheduled dosing  Baseline risks associated with therapy include: concomitant nephrotoxic medications  The patient is receiving 1500 mg IV q 8 H with the most recent vancomycin level being at steady-state and supratherapeutic based on a goal of 15-20 (appropriate for most indications) ; therefore, after clinical evaluation will be changed to 2000 mg IV q 12 H (trough 21 3)   Pharmacy will continue to follow closely for s/sx of nephrotoxicity, infusion reactions, and appropriateness of therapy  BMP and CBC will be ordered per protocol  Plan for trough as patient approaches steady state, prior to the 4th  dose at approximately 0530 on 9/2/20   Pharmacy will continue to follow the patients culture results and clinical progress daily      Nakul Warner, Pharmacist

## 2020-08-31 NOTE — PLAN OF CARE
Problem: Potential for Falls  Goal: Patient will remain free of falls  Description: INTERVENTIONS:  - Assess patient frequently for physical needs  -  Identify cognitive and physical deficits and behaviors that affect risk of falls    -  [place] fall precautions as indicated by assessment   - Educate patient/family on patient safety including physical limitations  - Instruct patient to call for assistance with activity based on assessment  - Modify environment to reduce risk of injury  - Consider OT/PT consult to assist with strengthening/mobility  Outcome: Progressing     Problem: PAIN - ADULT  Goal: Verbalizes/displays adequate comfort level or baseline comfort level  Description: Interventions:  - Encourage patient to monitor pain and request assistance  - Assess pain using appropriate pain scale  - Administer analgesics based on type and severity of pain and evaluate response  - Implement non-pharmacological measures as appropriate and evaluate response  - Consider cultural and social influences on pain and pain management  - Notify physician/advanced practitioner if interventions unsuccessful or patient reports new pain  Outcome: Progressing     Problem: INFECTION - ADULT  Goal: Absence or prevention of progression during hospitalization  Description: INTERVENTIONS:  - Assess and monitor for signs and symptoms of infection  - Monitor lab/diagnostic results  - Monitor all insertion sites, i e  indwelling lines, tubes, and drains  - Monitor endotracheal if appropriate and nasal secretions for changes in amount and color  - [place] appropriate cooling/warming therapies per order  - Administer medications as ordered  - Instruct and encourage patient and family to use good hand hygiene technique  - Identify and instruct in appropriate isolation precautions for identified infection/condition  Outcome: Progressing  Goal: Absence of fever/infection during neutropenic period  Description: INTERVENTIONS:  - Monitor WBC    Outcome: Progressing     Problem: SAFETY ADULT  Goal: Patient will remain free of falls  Description: INTERVENTIONS:  - Assess patient frequently for physical needs  -  Identify cognitive and physical deficits and behaviors that affect risk of falls  -  [place] fall precautions as indicated by assessment   - Educate patient/family on patient safety including physical limitations  - Instruct patient to call for assistance with activity based on assessment  - Modify environment to reduce risk of injury  - Consider OT/PT consult to assist with strengthening/mobility  Outcome: Progressing  Goal: Maintain or return to baseline ADL function  Description: INTERVENTIONS:  - Assess patient frequently for physical needs  -  Identify cognitive and physical deficits and behaviors that affect risk of falls    -  [place] fall precautions as indicated by assessment   - Educate patient/family on patient safety including physical limitations  - Instruct patient to call for assistance with activity based on assessment  - Modify environment to reduce risk of injury  - Consider OT/PT consult to assist with strengthening/mobility  Outcome: Progressing  Goal: Maintain or return mobility status to optimal level  Description: INTERVENTIONS:  -  Assess patient's ability to carry out ADLs; assess patient's baseline for ADL function and identify physical deficits which impact ability to perform ADLs (bathing, care of mouth/teeth, toileting, grooming, dressing, etc )  - Assess/evaluate cause of self-care deficits   - Assess range of motion  - Assess patient's mobility; develop plan if impaired  - Assess patient's need for assistive devices and provide as appropriate  - Encourage maximum independence but intervene and supervise when necessary  - Involve family in performance of ADLs  - Assess for home care needs following discharge   - Consider OT consult to assist with ADL evaluation and planning for discharge  - Provide patient education as appropriate  Outcome: Progressing     Problem: DISCHARGE PLANNING  Goal: Discharge to home or other facility with appropriate resources  Description: INTERVENTIONS:  - Identify barriers to discharge w/patient and caregiver  - Arrange for needed discharge resources and transportation as appropriate  - Identify discharge learning needs (meds, wound care, etc )  - Arrange for interpretive services to assist at discharge as needed  - Refer to Case Management Department for coordinating discharge planning if the patient needs post-hospital services based on physician/advanced practitioner order or complex needs related to functional status, cognitive ability, or social support system  Outcome: Progressing     Problem: Knowledge Deficit  Goal: Patient/family/caregiver demonstrates understanding of disease process, treatment plan, medications, and discharge instructions  Description: Complete learning assessment and assess knowledge base    Interventions:  - Provide teaching at level of understanding  - Provide teaching via preferred learning methods  Outcome: Progressing

## 2020-08-31 NOTE — CASE MANAGEMENT
Readmission  Lace 14  Patient was in house for a toe amputation 8/11  Discharged and readmitted with infection at site  Patient reports daily dressing changes daily which his wife performed--he reports not being offered VNA at d c from hospital  while at his podiatry OP visit he was set up with [facility]  Pt reports not missing any follow up appointments in the interim and that at last d c he was able to obtain all new medications and took them appropriately  Pt resides in [facility] w his spouse  Spouse works nights  Pt has no POA or advanced directive  Reports that he already has the resources needed  [place] home 3 steps to enter  Pt reports that he has not been using any dme in the home--that he has not needed it  However, he reports that OP podiatry office is currently working on obtaining him at [facility]  He denies the need for any other dme  Pt denies D&A or MH hx  Uses [facility] for medications, reports no issues obtaining them  PT eval rec STR  Cm offered to pt and spouse, pt refusing  He is agreeable to continued services w Traditional VNA  Referral sent  A post acute care recommendation was made by your care team for [facility] 76  Discussed Freedom of Choice with patient  List of agencies given to patient via in person  patient aware the list is custom filtered for them by preference  and that [facility] post acute providers are designated  No other needs reported at this time  Cm to follow    CM reviewed discharge planning process including the following: identifying help at home, patient preference for discharge planning needs, pharmacy preference, and availability of treatment team to discuss questions or concerns patient and/or family may have regarding understanding medications and recognizing signs and symptoms once discharged  CM also encouraged patient to follow up with all recommended appointments after discharge   Patient advised of importance for patient and family to participate in managing patients medical well being  CM name and role reviewed  Discharge Checklist reviewed and CM will continue to monitor for progress toward discharge goals in nursing and provider rounds

## 2020-08-31 NOTE — ASSESSMENT & PLAN NOTE
Recent history of right hallux amputation  Presented with wound dehiscence, wound cellulitis, possible osteomyelitis  2/2 blood culture from 08/28/2020 growing Staph aureus  Right foot MRI report reviewed and noted for osteomyelitis  Currently afebrile, hemodynamically stable  Fortunately acutely nontoxic appearing  Continue IV vancomycin and IV cefepime plus Flagyl  Cleared by vascular surgery for amputation  Follow-up with recommendation for planned OR   NPO after midnight tonight empirically while awaiting further podiatry recommendation  Encourage for strict nonweightbearing to right lower extremity at this time  Continue dressing changes per podiatrist recommendation  Patient is agreeable with above plan

## 2020-08-31 NOTE — PROGRESS NOTES
Paged by nurse  Wound culture from yesterday 8/30/20 resulted growing: rare polys, gram negative rods, and gram positive cocci in pairs  Already with known staph bacteremia  Repeat blood cultures pending drawn 8/30/20  Already on vanco and cefepime  Await sensitivities

## 2020-08-31 NOTE — UTILIZATION REVIEW
Notification of Inpatient Admission/Inpatient Authorization Request   This is a Notification of Inpatient Admission for [address]  Be advised that this patient was admitted to our facility under Inpatient Status  Contact Sabino Bonilla at [phone] for additional admission information  [facility] DEPT DEDICATED [place] [phone]  Patient Name:   Rupa Ortez   YOB: 1965       [address]   [address]:   [address]   Tax ID: [id number]  NPI: [id number] Attending Provider/NPI:  Phone:  Address: [facility] [[id number]]  [phone]  Same as Facility   Place of Service Code: 24     Place of Service Name:  [address] [place] [address]   Start Date: 8/28/20 1534 Discharge Date & Time: No discharge date for patient encounter  Type of Admission: Inpatient Status Discharge Disposition   (if discharged): Home/Self Care   Patient Diagnoses: Post op infection [T81 [id number]]  Cellulitis of right foot [[id number]]     Orders: Admission Orders (From admission, onward)     Ordered        08/28/20 1534  Inpatient Admission  Once                    Assigned Utilization Review Contact: Sabino Bonilla  Utilization   Network Utilization Review Department  Phone: [phone]; Fax [phone]  Email: [email or web address]   ATTENTION PAYERS: Please call the assigned Utilization  directly with any questions or concerns ALL voicemails in the department are confidential  Send all requests for admission clinical reviews, approved or denied determinations and any other requests to dedicated fax number belonging to the campus where the patient is receiving treatment

## 2020-08-31 NOTE — PLAN OF CARE
Problem: PHYSICAL THERAPY ADULT  Goal: Performs mobility at highest level of function for planned discharge setting  See evaluation for individualized goals  Description: Treatment/Interventions: Functional transfer training, Elevations, Therapeutic exercise, Patient/family training, Equipment eval/education, Bed mobility, Gait training          See flowsheet documentation for full assessment, interventions and recommendations  Note: Prognosis: Good  Problem List: Impaired balance, Decreased mobility, Decreased safety awareness, Impaired judgement, Orthopedic restrictions, Decreased skin integrity  Assessment: Pt is 60 y o  male seen for PT evaluation s/p admit to [facility] on 8/28/2020 w/ Wound infection  PT consulted to assess pt's functional mobility and d/c needs  Order placed for PT eval and tx, w/ NWB R LE order  Comorbidities affecting pt's physical performance at time of assessment include: DM II, PAD, obesity, and HTN  PTA, pt was independent w/ all functional mobility w/ no AD and has 3 DANIEL  Personal factors affecting pt at time of IE include: inaccessible home environment, ambulating w/ assistive device, stairs to enter home, inability to ambulate household distances, limited home support, impulsivity, inability to perform IADLs and inability to perform ADLs  Please find objective findings from PT assessment regarding body systems outlined above with impairments and limitations including impaired balance, decreased functional mobility tolerance, decreased safety awareness, impaired judgement, fall risk, orthopedic restrictions and decreased skin integrity  The following objective measures performed on IE also reveal limitations: Modified [place]: 4 (moderate/severe disability)  Pt's clinical presentation is currently evolving seen in pt's presentation of ongoing infection requiring further amputation  Orthopedics requested trial of knee scooter, but pt states he would prefer axillary crutches  Initial attempts to stand completed with RW for maximal support and pt does not attempt to maintain RLE NWB  Progression of assistive devices and mobility not attempted on this date due to inability to maintain RLE NWB and refusals to attempt  Pt will need to attempt to maintain RLE NWB for successful transfers with either axillary crutches or knee scooter  Will progress assistive devices as able  Pt to benefit from continued PT tx to address deficits as defined above and maximize level of functional independent mobility and consistency  From PT/mobility standpoint, recommendation at time of d/c would be STR pending progress in order to facilitate return to PLOF  PT Discharge Recommendation: Post-Acute Rehabilitation Services          See flowsheet documentation for full assessment

## 2020-08-31 NOTE — ASSESSMENT & PLAN NOTE
-continue to optimize medical management  -Atorvastatin 40mg   -management per [facility]

## 2020-08-31 NOTE — PROGRESS NOTES
Progress Note - Margarita Bustos 1965, 60 y o  male MRN: [id number]    Unit/Bed#: -01 Encounter: [id number]    Primary Care Provider: Nagi Wilkerson MD   Date and time admitted to hospital: 8/28/2020  3:14 PM        * Osteomyelitis [facility])  Assessment & Plan  Recent history of right hallux amputation  Presented with wound dehiscence, wound cellulitis, possible osteomyelitis  2/2 blood culture from 08/28/2020 growing Staph aureus  Right foot MRI report reviewed and noted for osteomyelitis  Currently afebrile, hemodynamically stable  Fortunately acutely nontoxic appearing  Continue IV vancomycin and IV cefepime plus Flagyl  Cleared by vascular surgery for amputation  F/w with Podiatry for further recommendation for OR  Encourage for strict nonweightbearing to right lower extremity at this time  Continue dressing changes per podiatrist recommendation  Patient is agreeable with above plan  Staphylococcus aureus bacteremia  Assessment & Plan  2/2 culture from 08/28/2020 growing Staph aureus  Likely source foot wound  Continue IV vancomycin for now  Vascular surgery, Podiatry following  Follow-up with ID consult  Pharmacy consult for vancomycin management  PAD (peripheral artery disease) [facility])  Assessment & Plan  Patient history of recent right popliteal PTA/ stent 08/06/2020 and right hallux amputation  Now noted with osteomyelitis as evident on right foot MRI report  Cleared by vascular surgery for further debridement and/or amputation  Continue aspirin, statin, Plavix  Follow-up with vascular surgery recommendation as well as podiatry following        Type 2 diabetes mellitus with diabetic polyneuropathy, with long-term current use of insulin [facility])  Assessment & Plan  Lab Results   Component Value Date    HGBA1C 8 1 (H) 08/28/2020       Recent Labs     08/30/20  1600 08/30/20  2048 08/31/20  0615 08/31/20  1128   POCGLU 160* 247* 105 195*       Blood Sugar Average: Last 72 hrs:  · (P) 942 [id number]     Uncontrolled  Hemoglobin A1c 8 1  Patient is not compliant with diet seen eating pizzas from outside  · Home medication includes lantus 80u at night as per home dose  · Home med includes short-acting 40 units with meals  · While inpatient I have patient on Lantus 35 units q h s  With meal coverage 15 units t i d   To avoid hypoglycemic episodes  · Continue diabetic diet  · Continue with sliding scale coverage  · Counseled on compliance with diet recommendation  Amputation of right great toe [facility])  Assessment & Plan  Now presented again with poor wound healing, wound dehiscence, possible cellulitis, osteomyelitis  Plan is as stated above under 1  And 2  Hyperthyroidism  Assessment & Plan  · Diagnosed with thyroid cancer in July  · TSH 0 080  · Free T4 1 39  · Continue home dose of methimazole 5 mg daily  · Recommended close outpatient follow-up  Hyperlipidemia  Assessment & Plan  · Continue statin    Gastroesophageal reflux disease without esophagitis  Assessment & Plan  · Continue PPI    Hypertension, essential  Assessment & Plan  · Controlled  · Continue anti-HTN meds  · Monitor per unit        VTE Pharmacologic Prophylaxis:   Pharmacologic:  Lovenox    Patient Centered Rounds: I have performed bedside rounds with nursing staff today  Discussions with Specialists or Other Care Team Provider:  Vascular surgery recommendation appreciated  Discussed with Podiatry  Education and Discussions with Family / Patient:  Wife present at bedside at the time of discussion  Time Spent for Care: 30 minutes  More than 50% of total time spent on counseling and coordination of care as described above  Current Length of Stay: 3 day(s)    Current Patient Status: Inpatient   Certification Statement: The patient will continue to require additional inpatient hospital stay due to above      Discharge Plan: TBD    Code Status: Level 1 - Full Code      Subjective: Afebrile, hemodynamically stable  MRI report noted for osteomyelitis  I have discussed with patient to be compliant with non weight-bearing  No other events reported  Objective:     Vitals:   Temp (24hrs), Av 4 °F (36 9 °C), Min:98 1 °F (36 7 °C), Max:98 6 °F (37 °C)    Temp:  [98 1 °F (36 7 °C)-98 6 °F (37 °C)] 98 4 °F (36 9 °C)  HR:  [69-75] 75  Resp:  [17-18] 18  BP: (129-146)/(81-95) 146/87  SpO2:  [96 %] 96 %  Body mass index is 30 89 kg/m²  Input and Output Summary (last 24 hours): Intake/Output Summary (Last 24 hours) at 2020 1629  Last data filed at 2020 1230  Gross per 24 hour   Intake 2041 67 ml   Output 1950 ml   Net 91 67 ml       Physical Exam:     Physical Exam  Constitutional:       General: He is not in acute distress  Appearance: Normal appearance  He is not ill-appearing  Comments: Chronically ill, acutely nontoxic appearing  HENT:      Head: Normocephalic and atraumatic  Nose: No rhinorrhea  Mouth/Throat:      Pharynx: No oropharyngeal exudate or posterior oropharyngeal erythema  Eyes:      Extraocular Movements: Extraocular movements intact  Conjunctiva/sclera: Conjunctivae normal       Pupils: Pupils are equal, round, and reactive to light  Neck:      Musculoskeletal: Normal range of motion and neck supple  No neck rigidity  Cardiovascular:      Rate and Rhythm: Normal rate and regular rhythm  Pulses: Normal pulses  Heart sounds: Normal heart sounds  Pulmonary:      Effort: Pulmonary effort is normal  No respiratory distress  Breath sounds: Normal breath sounds  No stridor  No wheezing or rhonchi  Abdominal:      General: Bowel sounds are normal  There is no distension  Palpations: Abdomen is soft  Tenderness: There is no abdominal tenderness  Musculoskeletal: Normal range of motion  General: No swelling  Comments: Right lower extremity dressing noted clean, dry, intact     Skin: Findings: No rash  Neurological:      General: No focal deficit present  Mental Status: He is alert and oriented to person, place, and time  Mental status is at baseline  Psychiatric:         Mood and Affect: Mood normal          Behavior: Behavior normal          Additional Data:     Labs:    Results from last 7 days   Lab Units 08/31/20  0615  08/28/20  1558   WBC Thousand/uL 7 89   < > 8 93   HEMOGLOBIN g/dL 12 5   < > 12 3   HEMATOCRIT % 37 7   < > 36 4*   PLATELETS Thousands/uL 400*   < > 366   NEUTROS PCT %  --   --  58   LYMPHS PCT %  --   --  26   MONOS PCT %  --   --  12   EOS PCT %  --   --  3    < > = values in this interval not displayed  Results from last 7 days   Lab Units 08/31/20  0615  08/28/20  1558   SODIUM mmol/L 136   < > 133*   POTASSIUM mmol/L 3 9   < > 4 2   CHLORIDE mmol/L 101   < > 96*   CO2 mmol/L 27   < > 26   BUN mg/dL 9   < > 19   CREATININE mg/dL 0 82   < > 0 94   ANION GAP mmol/L 8   < > 11   CALCIUM mg/dL 9 2   < > 9 1   ALBUMIN g/dL  --   --  3 0*   TOTAL BILIRUBIN mg/dL  --   --  0 50   ALK PHOS U/L  --   --  97   ALT U/L  --   --  44   AST U/L  --   --  50*   GLUCOSE RANDOM mg/dL 113   < > 182*    < > = values in this interval not displayed  Results from last 7 days   Lab Units 08/31/20  1612 08/31/20  1128 08/31/20  0615 08/30/20  2048 08/30/20  1600 08/30/20  1211 08/30/20  7927 08/30/20  0742 08/29/20  2117 08/29/20  1609 08/29/20  1042 08/29/20  0801   POC GLUCOSE mg/dl 243* 195* 105 247* 160* 115 110 102 213* 184* 174* 76     Results from last 7 days   Lab Units 08/28/20  1558   HEMOGLOBIN A1C % 8 1*     Results from last 7 days   Lab Units 08/28/20  1738 08/28/20  1558   LACTIC ACID mmol/L 1 3 2 2*           * I Have Reviewed All Lab Data Listed Above  * Additional Pertinent Lab Tests Reviewed:  All Labs Within Last 24 Hours Reviewed    Imaging:    Imaging Reports Reviewed Today Include:  MRI foot report reviewed      Recent Cultures (last 7 days): Results from last 7 days   Lab Units 08/31/20  0615 08/30/20  1646 08/28/20  1558   BLOOD CULTURE  Received in Microbiology Lab  Culture in Progress    --  Methicillin Resistant Staphylococcus aureus*  Methicillin Resistant Staphylococcus aureus*   GRAM STAIN RESULT   --  Rare Polys*  2+ Gram negative rods*  2+ Gram positive cocci in pairs* Gram positive cocci in clusters*  Gram positive cocci in clusters*   WOUND CULTURE   --  1+ Growth of Morganella morganii*  2+ Growth of Staphylococcus aureus*  --        Last 24 Hours Medication List:   Current Facility-Administered Medications   Medication Dose Route Frequency Provider Last Rate    acetaminophen  650 mg Oral Q6H PRN Aníbal Angel PA-C      aluminum-magnesium hydroxide-simethicone  30 mL Oral Q6H PRN Aníbal Angel PA-C      aspirin  81 mg Oral Daily Helen Mendoza PA-C      atorvastatin  40 mg Oral Daily With [employer] GABRIEL Mendoza      clopidogrel  75 mg Oral Daily Helen Mendoza PA-C      enoxaparin  40 mg Subcutaneous Q24H TESS Serafin YAN MD      insulin glargine  30 Units Subcutaneous HS Serafin YAN MD      insulin lispro  1-5 Units Subcutaneous 4x Daily (AC & HS) Helen Mendoza PA-C      insulin lispro  12 Units Subcutaneous TID With Meals Lois YAN MD      LORazepam  0 5 mg Oral BID PRN Aníbal Angel PA-C      methimazole  5 mg Oral Daily Helen Mendoza PA-C      ondansetron  4 mg Intravenous Q6H PRN Helen Mendoza PA-C      pantoprazole  20 mg Oral Early Morning Helen Mendoza PA-C      polyethylene glycol  17 g Oral Daily PRN Helen Mendoza PA-C      sodium chloride  100 mL/hr Intravenous Continuous Serafin YAN  mL/hr (08/31/20 1458)    vancomycin  2,000 mg Intravenous Q12H Serafin YAN MD      verapamil  240 mg Oral Daily Helen Mendoza PA-C      zolpidem  10 mg Oral HS PRN Aníbal Angel PA-C          Today, Patient Was Seen By: Blanca Watson MD    ** Please Note: Dictation voice to text software may have been used in the creation of this document   **

## 2020-08-31 NOTE — PROGRESS NOTES
Progress Note - Isi Antoine 1965, 60 y o  male MRN: [id number]    Unit/Bed#: -18 Encounter: [id number]    Primary Care Provider: Claude Decree, MD   Date and time admitted to hospital: 8/28/2020  3:14 PM        PAD (peripheral artery disease) [facility])  Assessment & Plan  27-year-old male nonsmoker with obesity, uncontrolled DM (HgbA1C 8 1), HTN, HLD, thyroid CA, GERD and PAD with recent Right popliteal PTA/Stents (IR 8/6) and R hallux amputation now presents with non-healing amputation site, dehiscence of the wound w/cellulitis  Vascular surgery consulted for evaluation of PAD and wound healing potential     Diagnostics:  -8/6 Angiogram with  popliteal with recanalization and stent angioplasty   -8/7 Post-intervention SHANNAN:  R SHANNAN 1 33/111/63; L SHANNAN 1 33/156/144  -MRI right foot 8/28/2020:  Soft tissue cellulitis and OM to 1st/2nd metatarsal   -x-ray right foot 8/28/2020:  OM distal 1st metatarsal  -Wound culture at [address] [facility] office: MRSA     -[facility] 8/28: staph aureus  -Repeat [facility] 8/31 pending  -LEAD pending    Plan:  -Right hallux amputation site with open wound, malodor, eschar and cellulitis of the forefoot  -RLE signals: Excellent triphasic PT doppler signal, Biphasic DP/AT, mono to biphasic Peroneal   The popliteal was biphasic to triphasic as well  -Will recheck LEAD for patency of popliteal stent and tibioperoneal artery patency/healing potential   If stent is occluded, pt will need an lower extremity bypass surgery  Further recommendations following LEAD  -Continue ASA/Plavix for stent patency x 6 months   -continue Lipitor for best medical management  -Podiatry consult noted  Pt will need further resection pending Vascular recommendations    -continue antibiotics  On Vanco   -Will d/w Dr Zafar Darby; SHANNAN was reviewed and continues to showing healing potential, full report pending and LEAD of RLE pending       Type 2 diabetes mellitus with diabetic polyneuropathy, with long-term current use of insulin [facility])  Assessment & Plan  Lab Results   Component Value Date    HGBA1C 8 1 (H) 08/28/2020       Recent Labs     08/30/20  1211 08/30/20  1600 08/30/20  2048 08/31/20  0615   POCGLU 115 160* 247* 105       Blood Sugar Average: Last 72 hrs:  (P) 123 [id number]     -continue to optimize BS control for optimization of wound healing and decrease SSI  -management per SLIM      Hyperlipidemia  Assessment & Plan  -continue to optimize medical management  -Atorvastatin 40mg   -management per SLIM    Hypertension, essential  Assessment & Plan  -BP well controlled  -continue current medical regimen  -management per primary medical service    * Wound infection  Assessment & Plan  R hallux amputation site with wound infection and associated cellulitis  -see plan above        Subjective:  Patient reports he has not had much sleep in the hospital   He denies any pain to the right foot, denies rest pain or claudication symptoms, denies F/C, CP/SOB   VSS  Vitals:  /95   Pulse 69   Temp 98 1 °F (36 7 °C)   Resp 18   Ht 6' 4" (1 93 m)   Wt 115 kg (253 lb 12 oz)   SpO2 96%   BMI 30 89 kg/m²     I/Os:  I/O last 3 completed shifts: In: 2047 7 [P O :616; I V :1431 7]  Out: 2550 [Urine:2550]  I/O this shift:  In: -   Out: 750 [Urine:750]    Lab Results and Cultures:   Lab Results   Component Value Date    WBC 7 89 08/31/2020    HGB 12 5 08/31/2020    HCT 37 7 08/31/2020    MCV 91 08/31/2020     (H) 08/31/2020     Lab Results   Component Value Date    CALCIUM 9 2 08/31/2020    K 3 9 08/31/2020    CO2 27 08/31/2020     08/31/2020    BUN 9 08/31/2020    CREATININE 0 82 08/31/2020     Lab Results   Component Value Date    INR 1 04 08/04/2020    INR 0 99 11/15/2018    PROTIME 13 8 08/04/2020    PROTIME 13 0 11/15/2018        Blood Culture:   Lab Results   Component Value Date    BLOODCX Received in Microbiology Lab  Culture in Progress   08/31/2020   ,   Urinalysis:   Lab Results   Component Value Date COLORU Yellow 09/27/2019    CLARITYU Clear 09/27/2019    SPECGRAV 1 020 09/27/2019    PHUR 5 5 09/27/2019    PHUR 6 0 11/15/2018    LEUKOCYTESUR (A) 09/27/2019     Elevated glucose may cause decreased leukocyte values   See urine microscopic for [facility] result/    NITRITE Negative 09/27/2019    GLUCOSEU >=1000 (1%) (A) 09/27/2019    KETONESU Negative 09/27/2019    BILIRUBINUR Negative 09/27/2019    BLOODU Moderate (A) 09/27/2019   ,   Urine Culture: No results found for: URINECX,   Wound Culure:   Lab Results   Component Value Date    WOUNDCULT 3+ Growth of Morganella morganii (A) 08/04/2020    WOUNDCULT 2+ Growth of Citrobacter freundii (A) 08/04/2020    WOUNDCULT 2+ Growth of Enterobacter cloacae complex (A) 08/04/2020    WOUNDCULT 2+ Growth of  08/04/2020       Medications:  Current Facility-Administered Medications   Medication Dose Route Frequency    acetaminophen (TYLENOL) tablet 650 mg  650 mg Oral Q6H PRN    aluminum-magnesium hydroxide-simethicone (MYLANTA) 200-200-20 mg/5 mL oral suspension 30 mL  30 mL Oral Q6H PRN    aspirin chewable tablet 81 mg  81 mg Oral Daily    atorvastatin (LIPITOR) tablet 40 mg  40 mg Oral Daily With Dinner    cefepime (MAXIPIME) 2,000 mg in dextrose 5 % 50 mL IVPB  2,000 mg Intravenous Q8H    clopidogrel (PLAVIX) tablet 75 mg  75 mg Oral Daily    enoxaparin (LOVENOX) subcutaneous injection 40 mg  40 mg Subcutaneous Q24H TESS    insulin glargine (LANTUS) subcutaneous injection 30 Units 0 3 mL  30 Units Subcutaneous HS    insulin lispro (HumaLOG) 100 units/mL subcutaneous injection 1-5 Units  1-5 Units Subcutaneous 4x Daily (AC & HS)    insulin lispro (HumaLOG) 100 units/mL subcutaneous injection 8 Units  8 Units Subcutaneous TID With Meals    LORazepam (ATIVAN) tablet 0 5 mg  0 5 mg Oral BID PRN    methimazole (TAPAZOLE) tablet 5 mg  5 mg Oral Daily    ondansetron (ZOFRAN) injection 4 mg  4 mg Intravenous Q6H PRN    pantoprazole (PROTONIX) EC tablet 20 mg  20 mg Oral Early Morning    polyethylene glycol (MIRALAX) packet 17 g  17 g Oral Daily PRN    sodium chloride 0 9 % infusion  100 mL/hr Intravenous Continuous    vancomycin (VANCOCIN) 1,500 mg in sodium chloride 0 9 % 250 mL IVPB  15 mg/kg (Adjusted) Intravenous Q8H    verapamil (CALAN-SR) CR tablet 240 mg  240 mg Oral Daily    zolpidem (AMBIEN) tablet 10 mg  10 mg Oral HS PRN       Imaging:  LEAD pending    Physical Exam:    General appearance: alert and oriented, in no acute distress, cooperative and no distress  Skin: Skin color, texture, turgor normal  No rashes or lesions or SEE clinical image below  Neurologic: Grossly normal  Neck: no adenopathy, no carotid bruit, no JVD, supple, symmetrical, trachea midline and thyroid not enlarged, symmetric, no tenderness/mass/nodules  Lungs: clear to auscultation bilaterally  Heart: regular rate and rhythm, S1, S2 normal, no murmur, click, rub or gallop  Abdomen: soft, non-tender; bowel sounds normal; no masses,  no organomegaly  Extremities: B/L LE are warm and dry  The right foot has extensive foot dressing and ACE wrap  Unable to palpate pulses through dressing and unable to palpate right popliteal artery pulse  NO edema noted to B/L Legs  M/S intact at baseline per patient        Wound/Incision:    RIGHT FOOT:                  Pulse exam:  Radial: Right: 2+ Left[de-identified] 2+  Femoral: Right: 2+ Left: 2+  Popliteal: Right: non-palpable Left: non-palpable  DP: Right: non-palpable Left: 1+  PT: Right: non-palpable Left: non-palpable      NAILA Navarro  8/31/2020  [facility]  [phone]

## 2020-08-31 NOTE — CONSULTS
Consultation - Infectious Disease   Mckinley Stoner 60 y o  male MRN: [id number]  Unit/Bed#: -01 Encounter: [id number]      IMPRESSION & RECOMMENDATIONS:   Impression/Recommendations:  1  MRSA bacteremia secondary to R foot infection/osteomyelitis  Patient with 2/2 blood cultures positive  2D echo pending  No indwelling prosthetic devices  Repeat cultures pending  Rec:   · Continue Vancomycin   · Pharmacy on board for vancomycin dosing  · Await results of 2D echo  · Await results of repeat blood cultures  2   R foot infection with phlegmon and osteomyelitis involving distal residual first metatarsal, second metatarsal and proximal phalynx following R great toe amputation  Additionally with distal flexor hallucis tenosynovitis  Patient with Morganella, Citrobacter, and Enterobacter from R foot on the last admission  Now recent outpatient cultures reportedly with MRSA as well as blood cultures  Wound cultures on admission with GNR and GPC in pairs  Rec:   · Continue Vancomycin as above  · Continue Cefepime  · T/c adding flagyl  · Awaiting final wound culture results from this admission  · Await further operative intervention pending vascular clearance  3   PAD, s/p Angiogram with  popliteal with recanalization and stent angioplasty on last admission  Now awaiting further evaluation to make sure stent is not occluded  Rec:   · Await further vascular work up     5  S/p R great toe amputation for dry gangrene and osteomyelitis on 8/11/2020      5   DM2, most recent HGBA1C 8 1 on 8/28/2020  Continue management as per primary team      6   Obesity  BMI 29 7    7  Recent Dx of Thyroid cancer  Antibiotics:  Vancomycin/Cefepime #4    Thank you for this consultation  We will follow along with you      HISTORY OF PRESENT ILLNESS:  Reason for Consult: RLE cellulitis    HPI: Mckinley Stoner is a 60 y o  male with diabetes, obesity, hyperlipidemia and HTN presented to the ED with right great toe pain on 8/28/2020  Patient recently hospitalized earlier this month with sepsis and dry gangrene  MRI revealed osteomyelitis of the 1st distal phalanx  He underwent amputation on 8/11/2020 with presumed source control  He received IV Cefepime and flagyl while in the hospital and was discharged home to complete po bactrim and flagyl through 8/17/2020  His wound cultures on last admission grew Morganella, Citrobacter, and Enterobacter  Prior to the amputation patient also underwent angiogram with  popliteal recanalization and stent angioplasty  Patient reports he completed his antibiotics as prescribed  He continued to follow with Podiatry after his discharge  They initially had him on po keflex then switched to doxycycline last week after outpatient cultures revealed MRSA  He then went on to develop shaking chill and foul smelling drainage from the amputation site  Patient was advised to come to the ED for admission  In the ED, he remained afebrile with stable vital signs  He was found to have lactic acidosis of 2 2  He was admitted on Vancomycin and Cefepime  Unfortunately his blood cultures have now returned 2/2 MRSA  Repeat cultures were drawn this am   2D echo was also done this am with results pending  He is also awaiting further evaluation by vascular  MRI revealed soft tissue wound/ulcer adjacent to distal first metatarsal with underlying phlegmonous cellulitis with a thin tapering tail extending towards the distal residual flexor hallucis longus tendon, new osteomyelitis of the distal residual first metatarsal tapering proximally to the level of the base  Reactive changes in the hallux tibial sesamoid, new osteomyelitis medial aspect of the second metatarsal head and base of the second proximal phalanx, and new distal flexor hallucis longus tenosynovitis extending to the level of the mid foot  Infectious tenosynovitis not excluded       REVIEW OF SYSTEMS:  Constitutional: + chills, denies fever  HEENT: denies headache  Cardiovascular: denies chest pain  Pulmonary: denies sob/cough  GI: denies N/V, diarrhea  : denies dysuria  Back: denies new onset back pain  Skin: denies rash  Ext: + wound of the right foot with foul smelling drainage  Musculoskeletal: denies arthralgias  A complete system-based review of systems is otherwise negative  PAST MEDICAL HISTORY:  Past Medical History:   Diagnosis Date    Diabetes mellitus ([facility] [address] )     Hyperlipidemia     Hypertension     Thyroid cancer [facility])      Past Surgical History:   Procedure Laterality Date    FOOT SURGERY Right     IR AORTAGRAM WITH RUN-OFF  2020    TOE AMPUTATION Right 2020    Procedure: AMPUTATION TOE;  Surgeon: Argelia Vargas DPM;  [facility];  Service: Podiatry    US GUIDED THYROID BIOPSY  2020       FAMILY HISTORY:  Non-contributory    SOCIAL HISTORY:  Social History     Substance and Sexual Activity   Alcohol Use Not Currently    Alcohol/week: 8 0 standard drinks    Types: 8 Cans of beer per week    Comment: last drink was 10 days ago     Social History     Substance and Sexual Activity   Drug Use No    Types: Marijuana     Social History     Tobacco Use   Smoking Status Never Smoker   Smokeless Tobacco Never Used       ALLERGIES:  No Known Allergies    MEDICATIONS:  All current active medications have been reviewed      PHYSICAL EXAM:  Vitals:  Temp:  [97 5 °F (36 4 °C)-98 6 °F (37 °C)] 98 1 °F (36 7 °C)  HR:  [68-69] 69  Resp:  [16-18] 18  BP: (124-141)/(78-95) 129/95  SpO2:  [96 %-97 %] 96 %  Temp (24hrs), Av 1 °F (36 7 °C), Min:97 5 °F (36 4 °C), Max:98 6 °F (37 °C)  Current: Temperature: 98 1 °F (36 7 °C)     Physical Exam:  General:  Well-nourished, well-developed, in no acute distress  Eyes:  Conjunctive clear with no hemorrhages or effusions  Oropharynx:  No ulcers, no lesions  Neck:  Supple, no lymphadenopathy  Lungs:  Clear to auscultation bilaterally, no accessory muscle use  Cardiac:  Regular rate and rhythm, no murmurs  Abdomen:  Soft, non-tender, non-distended  : no casey  Back: nontender  Extremities:  R foot with open wound at previous amputation site   + back eschar wand yellow appearing slough noted on pictures of wound in the chart  Skin:  No rashes, no ulcers  Neurological:  Moves all four extremities spontaneously, sensation grossly intact      LABS, IMAGING, & OTHER STUDIES:  Lab Results:  I have personally reviewed pertinent labs  Results from last 7 days   Lab Units 08/31/20  0615 08/30/20  0407 08/29/20  0519 08/28/20  1558   POTASSIUM mmol/L 3 9 4 5 4 3 4 2   CHLORIDE mmol/L 101 105 100 96*   CO2 mmol/L 27 26 26 26   BUN mg/dL 9 11 13 19   CREATININE mg/dL 0 82 0 89 0 93 0 94   EGFR ml/min/1 73sq m 98 95 91 90   CALCIUM mg/dL 9 2 8 3 8 7 9 1   AST U/L  --   --   --  50*   ALT U/L  --   --   --  44   ALK PHOS U/L  --   --   --  97     Results from last 7 days   Lab Units 08/31/20  0615 08/30/20  0407 08/29/20  0519   WBC Thousand/uL 7 89 7 81 9 52   HEMOGLOBIN g/dL 12 5 10 8* 11 2*   PLATELETS Thousands/uL 400* 327 334     Results from last 7 days   Lab Units 08/31/20  0615 08/30/20  1646 08/28/20  1558   BLOOD CULTURE  Received in Microbiology Lab  Culture in Progress  --  Methicillin Resistant Staphylococcus aureus*  Methicillin Resistant Staphylococcus aureus*   GRAM STAIN RESULT   --  Rare Polys*  2+ Gram negative rods*  2+ Gram positive cocci in pairs* Gram positive cocci in clusters*  Gram positive cocci in clusters*         Imaging Studies:   I have personally reviewed pertinent imaging study reports and images in PACS  EKG, Pathology, and Other Studies:   I have personally reviewed pertinent reports

## 2020-08-31 NOTE — ASSESSMENT & PLAN NOTE
57-year-old male nonsmoker with obesity, uncontrolled DM (HgbA1C 8 1), HTN, HLD, thyroid CA, GERD and PAD with recent Right popliteal PTA/Stents (IR 8/6) and R hallux amputation now presents with non-healing amputation site, dehiscence of the wound w/cellulitis  Vascular surgery consulted for evaluation of PAD and wound healing potential     Diagnostics:  -8/6 Angiogram with  popliteal with recanalization and stent angioplasty   -8/7 Post-intervention SHANNAN:  R SHANNAN 1 33/111/63; L SHANNAN 1 33/156/144  -MRI right foot 8/28/2020:  Soft tissue cellulitis and OM to 1st/2nd metatarsal   -x-ray right foot 8/28/2020:  OM distal 1st metatarsal  -Wound culture at [address] office: MRSA     -[facility] 8/28: staph aureus  -Repeat [facility] 8/31 pending  -LEAD pending    Plan:  -Right hallux amputation site with open wound, malodor, eschar and cellulitis of the forefoot  -RLE signals: Excellent triphasic PT doppler signal, Biphasic DP/AT, mono to biphasic Peroneal   The popliteal was biphasic to triphasic as well  -Will recheck LEAD for patency of popliteal stent and tibioperoneal artery patency/healing potential   If stent is occluded, pt will need an lower extremity bypass surgery  Further recommendations following LEAD  -Continue ASA/Plavix for stent patency x 6 months   -continue Lipitor for best medical management  -Podiatry consult noted  Pt will need further resection pending Vascular recommendations    -continue antibiotics    On Vanco   -Will d/w Dr Reggie Basilio

## 2020-08-31 NOTE — ASSESSMENT & PLAN NOTE
Lab Results   Component Value Date    HGBA1C 8 1 (H) 08/28/2020       Recent Labs     08/30/20  1211 08/30/20  1600 08/30/20  2048 08/31/20  0615   POCGLU 115 160* 247* 105       Blood Sugar Average: Last 72 hrs:  (P) 623 [id number]     -continue to optimize BS control for optimization of wound healing and decrease SSI  -management per [facility]

## 2020-08-31 NOTE — PLAN OF CARE
Problem: OCCUPATIONAL THERAPY ADULT  Goal: Performs self-care activities at highest level of function for planned discharge setting  See evaluation for individualized goals  Description: Treatment Interventions: ADL retraining, Functional transfer training, UE strengthening/ROM, Endurance training, Patient/family training, Equipment evaluation/education, Compensatory technique education, Energy conservation, Activityengagement, Continued evaluation          See flowsheet documentation for full assessment, interventions and recommendations  Note: Limitation: Decreased ADL status, Decreased Safe judgement during ADL, Decreased endurance, Decreased self-care trans, Decreased high-level ADLs  Prognosis: Fair  Assessment: Patient is a 60 y o  male seen for OT evaluation s/p admit to [id number] [place] [address] on 8/28/2020 w/Wound infection  Commorbidities affecting patient's functional performance at time of assessment include: staphyococcus aureus bacteremia, PAD, type 2 diabetes mellitus with diabetic polyneuropathy with long-term current use of insulin, amputation of right great toe, hyperthyroidism, hyperlipidemia, GERD without esophagitis, and essential HTN  Orders placed for OT evaluation and treatment NWB RLE per podiatry  Performed at least two patient identifiers during session including name and wristband  Prior to admission, patient was living with his significant other in a one-story ranch with 3 [address] and no railings  Patient reports that at baseline, he is independent in ADLs and requires assistance with IADLs  Personal factors affecting patient at time of initial evaluation include: steps to enter, decreased initiation and engagement, difficulty performing ADLs and difficulty performing IADLs  Upon evaluation, patient requires supervision and set up assist for UB ADLs, supervision and minimal  assist for LB ADLs, transfers with minimal  assist, with the use of Rolling Walker   Patient required max VCs to maintain WB restriction, however patient reports that he will likely not follow through with NWB status despite extensive education  Patient is alert and oriented x 4, presents with limited ability to identify values ( ideas or beliefs that are important to self and others) and decreased coping skills with limited ability to identify and manage stress  Occupational performance is affected by the following deficits: impulsive behavior, dynamic sit/ stand balance deficit with poor standing tolerance time for self care and functional mobility, decreased activity tolerance, decreased safety awareness, decreased coping skills and postural control and postural alignment deficit, requiring external assistance to complete transitional movements, and weightbearing restrictions  Therapist completed  extensive additional review of medical records and additional review of physical, cognitive or psychosocial history, clinical examination identifying 5 or more performance deficits, clinical decision making of a high complexity , consistent with a high complexity level evaluation   Patient to benefit from continued Occupational Therapy treatment while in the hospital to address deficits as defined above and maximize level of functional independence with ADLs and functional mobil     OT Discharge Recommendation: Post-Acute Rehabilitation Services

## 2020-08-31 NOTE — OCCUPATIONAL THERAPY NOTE
Occupational Therapy Evaluation Note        Patient Name: Renetta Cabrera  QIEZT'Y Date: 8/31/2020 08/31/20 6240   Note Type   Note type Eval only   Restrictions/Precautions   Weight Bearing Precautions Per Order Yes   RLE Weight Bearing Per Order NWB  (per orthopedics)   Braces or Orthoses Other (Comment)  (none reported)   Other Precautions Impulsive; Bed Alarm; Fall Risk;Multiple lines   Pain Assessment   Pain Assessment Tool 0-10   Pain Score No Pain   Home Living   Type of [address] One level;Performs ADLs on one level; Able to live on main level with bedroom/bathroom  (Ranch; [address] w/ no railings)   Bathroom Shower/Tub Tub/shower unit   Bathroom Toilet Standard   Bathroom Equipment Grab bars in shower   [address] Other (Comment)  (none at baseline)   Prior Function   Level of [place] Independent with ADLs and functional mobility; Needs assistance with IADLs   Lives With Significant other   Receives Help From Family   ADL Assistance Independent   IADLs Needs assistance   Falls in the last 6 months 0   Vocational On disability   Lifestyle   Autonomy Per pt report, he lives with his significant other in a one-story home with 3 [address] with no railings  Patient reports that at baseline, he is independent in ADLs and requires assistance with IADLs from his significant other, including meal preparation and medication management  Patient reports that he has only recently been ambulating short distances in his home and does not use DME at baseline for functional mobility     Reciprocal Relationships Girlfriend is planning on taking vacation time   Service to Others Currently on disability, typically  at [facility] [employer] Patient reports that at time of evaluation he does not have any leisure interests or activities that he engages in   [address] ([address]) X   Patient Behaviors/Mood Withdrawn;Depressed   ADL Eating Assistance 5  Supervision/Setup   Grooming Assistance 5  Supervision/Setup   UB Bathing Assistance 5  Supervision/Setup   LB Bathing Assistance 5  Supervision/Setup   UB Dressing Assistance 5  Supervision/Setup   LB Dressing Assistance 4  Minimal Assistance   Toileting Assistance  4  Minimal Assistance   Functional Assistance 4  Minimal Assistance   Additional Comments ADL assist levels based on pt's functional performance during OT evaluation   Bed Mobility   Supine to Sit 5  Supervision   Additional items Assist x 1; Increased time required;Verbal cues   Sit to Supine 5  Supervision   Additional items Assist x 1; Increased time required;Verbal cues   Additional Comments Pt received lying supine in bed upon OT arrival; at end of session: pt returned lying supine w/ all needs within reach and bed alarm attempted to be activated, however not working  RN made aware   Transfers   Sit to Stand 4  Minimal assistance   Additional items Assist x 1; Increased time required;Verbal cues; Impulsive   Stand to Sit 4  Minimal assistance   Additional items Assist x 1; Increased time required;Verbal cues   Additional Comments Patient was educated on NWB status for RLE and provided a demonstration of how to perform transfers with RW while maintaining WB status  Patient reported "I'm going to put weight through my heel", despite max VCs and education   Patient also stated "I know about the WB restriction, whether I do it or not is a different story "   Functional Mobility   Functional Mobility 4  Minimal assistance   Additional Comments 1 forward step and 1 backward step; further mobility deferred due to pt refusal and inability to maintain WB restriction   Additional items Rolling walker   Balance   Static Sitting Normal   Dynamic Sitting Good   Static Standing Fair -   Dynamic Standing Poor +   Ambulatory Poor +   Activity Tolerance   Activity Tolerance Patient limited by fatigue   Nurse [id number] [facility] confirmed pt appropriate for therapy and made aware of session outcomes   RUE Assessment   RUE Assessment WFL  (Strength and AROM WFL; proximal: 5/5)   LUE Assessment   LUE Assessment WFL  (Strength and AROM WFL; proximal: 5/5)   Hand Function   Gross Motor Coordination Functional   Fine Motor Coordination Functional   Sensation   Light Touch   (Patient reports neuropathy in bilateral feet)   Vision-Basic Assessment   Current Vision Wears glasses all the time   Cognition   Overall Cognitive Status [facility]   Arousal/Participation Alert; Responsive   Attention Within functional limits   Orientation Level Oriented X4   Memory Within functional limits   Following Commands Follows all commands and directions without difficulty   Comments Pt agreeable to OT evaluation  Patient presented with signs/symptoms of depression throughout evaluation and demonstrated decreased safety awareness, stating that he is not going to adhere to his NWB restriction per podiatry  Podiatry made aware via Method  Assessment   Limitation Decreased ADL status; Decreased Safe judgement during ADL;Decreased endurance;Decreased self-care trans;Decreased high-level ADLs   Prognosis Fair   Assessment Patient is a 60 y o  male seen for OT evaluation s/p admit to [id number] [place] [address] on 8/28/2020 w/Wound infection  Commorbidities affecting patient's functional performance at time of assessment include: staphyococcus aureus bacteremia, PAD, type 2 diabetes mellitus with diabetic polyneuropathy with long-term current use of insulin, amputation of right great toe, hyperthyroidism, hyperlipidemia, GERD without esophagitis, and essential HTN  Orders placed for OT evaluation and treatment NWB RLE per podiatry  Performed at least two patient identifiers during session including name and wristband  Prior to admission, patient was living with his significant other in a one-story ranch with 3 [address] and no railings   Patient reports that at baseline, he is independent in ADLs and requires assistance with IADLs  Personal factors affecting patient at time of initial evaluation include: steps to enter, decreased initiation and engagement, difficulty performing ADLs and difficulty performing IADLs  Upon evaluation, patient requires supervision and set up assist for UB ADLs, supervision and minimal  assist for LB ADLs, transfers with minimal  assist, with the use of Rolling Walker  Patient required max VCs to maintain WB restriction, however patient reports that he will likely not follow through with NWB status despite extensive education  Patient is alert and oriented x 4, presents with limited ability to identify values ( ideas or beliefs that are important to self and others) and decreased coping skills with limited ability to identify and manage stress  Occupational performance is affected by the following deficits: impulsive behavior, dynamic sit/ stand balance deficit with poor standing tolerance time for self care and functional mobility, decreased activity tolerance, decreased safety awareness, decreased coping skills and postural control and postural alignment deficit, requiring external assistance to complete transitional movements, and weightbearing restrictions  Therapist completed  extensive additional review of medical records and additional review of physical, cognitive or psychosocial history, clinical examination identifying 5 or more performance deficits, clinical decision making of a high complexity , consistent with a high complexity level evaluation  Patient to benefit from continued Occupational Therapy treatment while in the hospital to address deficits as defined above and maximize level of functional independence with ADLs and functional mobility   Occupational Performance areas to address include: grooming , bathing/ shower, dressing, toilet hygiene, transfer to all surfaces, functional mobility, community mobility, health maintenance, medication routine/ management, IADLs: safety procedures, Leisure Participation and Social participation  From OT standpoint, recommendation at time of d/c would be post-acute rehabilitation  Goals   Patient Goals to return home   Plan   Treatment Interventions ADL retraining;Functional transfer training;UE strengthening/ROM; Endurance training;Patient/family training;Equipment evaluation/education; Compensatory technique education; Energy conservation; Activityengagement;Continued evaluation   Goal Expiration Date 09/14/20   OT Frequency 3-5x/wk   Recommendation   OT Discharge Recommendation Post-Acute Rehabilitation Services   Barthel Index   Feeding 10   Bathing 0   Grooming Score 5   Dressing Score 10   Bladder Score 10   Bowels Score 10   Toilet Use Score 5   Transfers (Bed/Chair) Score 5   Mobility (Level Surface) Score 0   Stairs Score 0   Barthel Index Score 55   Modified Nirali Scale   Modified [place] Scale 4     Occupational Therapy Goals to be completed in 7-14 Days:    1- Patient will verbalize and demonstrate good body mechanics and joint protection techniques during ADLs/ IADLs with no verbal cues  2- Patient will increase OOB/ sitting tolerance to 4-6 hours per day for increased participation in self care and leisure tasks with no s/s of exertion  3- Patient/ Family will demonstrate competency with UE Home Exercise Program      4- Patient will engage in depression screen/ leisure interest check list to identify s/s of depression and facilitate patients involvement in play/ leisure tasks  5- Patient will identify 3 positive coping strategies to assist with stressregulation and management  6- Patient  will engage in activity configuration activity with good participation and mod I to increase time management skills and improve participation in a structured routine to improve overall quality of life  7- Patient will complete LB ADLs at Mod I level, with use of compensatory techniques as indicated       8- Patient will complete UB ADLs at Mod I level  9- Patient will perform functional transfers at Mod I level with good safety awareness, maintenance of WB status, and least restrictive % of the time      Юлия Garg OTR/L

## 2020-08-31 NOTE — ASSESSMENT & PLAN NOTE
Lab Results   Component Value Date    HGBA1C 8 1 (H) 08/28/2020       Recent Labs     08/30/20  1600 08/30/20  2048 08/31/20  0615 08/31/20  1128   POCGLU 160* 247* 105 195*       Blood Sugar Average: Last 72 hrs:  · (P) 274 [id number]     Uncontrolled  Hemoglobin A1c 8 1   · Home medication includes lantus 80u at night as per home dose  · Home med includes short-acting 40 units with meals  · Will decrease insulin coverage due to hypoglycemic episodes  · Continue Lantus 30 units q h s , meal coverage 12 units t i d   · Continue diabetic diet  · Continue with sliding scale coverage

## 2020-08-31 NOTE — ASSESSMENT & PLAN NOTE
Patient history of recent right popliteal PTA/ stent 08/06/2020 and right hallux amputation  Now noted with osteomyelitis as evident on right foot MRI report  Cleared by vascular surgery for further debridement and/or amputation  Continue aspirin, statin, Plavix  Follow-up with vascular surgery recommendation as well as podiatry following

## 2020-08-31 NOTE — PHYSICAL THERAPY NOTE
Physical Therapy Evaluation    Patient Name: Thom Hollis    BJVCB'D Date: 8/31/2020     Problem List  Principal Problem:    Wound infection  Active Problems:    Hypertension, essential    Gastroesophageal reflux disease without esophagitis    Hyperlipidemia    Type 2 diabetes mellitus with diabetic polyneuropathy, with long-term current use of insulin (HCC)    Hyperthyroidism    PAD (peripheral artery disease) (HCC)    Amputation of right great toe ([facility] [address] )    Staphylococcus aureus bacteremia       Past Medical History  Past Medical History:   Diagnosis Date    Diabetes mellitus ([facility] [address] )     Hyperlipidemia     Hypertension     Thyroid cancer [facility])         Past Surgical History  Past Surgical History:   Procedure Laterality Date    FOOT SURGERY Right 2014    IR AORTAGRAM WITH RUN-OFF  8/6/2020    TOE AMPUTATION Right 8/11/2020    Procedure: AMPUTATION TOE;  Surgeon: Marie Cazares DPM;  Location: [facility];  Service: Podiatry    US GUIDED THYROID BIOPSY  7/2/2020 08/31/20 8429   Note Type   Note type Eval only   Pain Assessment   Pain Assessment Tool Pain Assessment not indicated - pt denies pain   Home Living   Type of [address] One level; Able to live on main level with bedroom/bathroom; Performs ADLs on one level;Stairs to enter without rails  (3STE)   Bathroom Shower/Tub Tub/shower unit   Bathroom Toilet Standard   Bathroom Equipment Grab bars in shower   [address] Other (Comment)  (none)   Prior Function   Level of [place] Independent with ADLs and functional mobility; Needs assistance with IADLs   Lives With Significant other   Receives Help From Family   ADL Assistance Independent   IADLs Needs assistance   Falls in the last 6 months 0   Vocational On disability   Restrictions/Precautions   Weight Bearing Precautions Per Order Yes   RLE Weight Bearing Per Order NWB  (Pt unable to maintain)   Braces or Orthoses Other (Comment)  (none reported)   Other Precautions Impulsive; Bed Alarm; Fall Risk;Multiple lines   Cognition   Overall Cognitive Status WFL   Attention Within functional limits   Orientation Level Oriented X4   Memory Within functional limits   Following Commands Follows all commands and directions without difficulty   RUE Assessment   RUE Assessment WFL  (Strength and AROM WFL; proximal: 5/5)   LUE Assessment   LUE Assessment WFL  (Strength and AROM WFL; proximal: 5/5)   RLE Assessment   RLE Assessment WNL   LLE Assessment   LLE Assessment WNL   Bed Mobility   Supine to Sit 5  Supervision   Additional items Assist x 1; Increased time required; Bedrails   Sit to Supine 5  Supervision   Additional items Assist x 1; Increased time required; Bedrails   Additional Comments Bed alarm not functioning  RN aware  Transfers   Sit to Stand 5  Supervision  (does not maintain RLE NWB)   Additional items Increased time required;Verbal cues   Stand to Sit 5  Supervision   Additional items Increased time required;Verbal cues  (does not maintain RLE NWB)   Ambulation/Elevation   Gait pattern   (not assessed as pt unable to maintain RLE NWB)   Balance   Static Sitting Normal   Dynamic Sitting Normal   Static Standing Fair -   Dynamic Standing   (did not assess as pt unable to maintain RLE NWB)   Ambulatory   (did not assess as pt unable to maintain RLE NWB)   Activity Tolerance   Activity Tolerance Other (Comment)  (session limited due to inability to maintain RLE NWB)   Nurse Made Aware RN Vane   Assessment   Prognosis Good   Problem List Impaired balance;Decreased mobility; Decreased safety awareness; Impaired judgement;Orthopedic restrictions;Decreased skin integrity   Assessment Pt is 60 y o  male seen for PT evaluation s/p admit to [facility] on 8/28/2020 w/ Wound infection  PT consulted to assess pt's functional mobility and d/c needs  Order placed for PT eval and tx, w/ NWB R LE order  Comorbidities affecting pt's physical performance at time of assessment include: DM II, PAD, obesity, and HTN  PTA, pt was independent w/ all functional mobility w/ no AD and has 3 DANIEL  Personal factors affecting pt at time of IE include: inaccessible home environment, ambulating w/ assistive device, stairs to enter home, inability to ambulate household distances, limited home support, impulsivity, inability to perform IADLs and inability to perform ADLs  Please find objective findings from PT assessment regarding body systems outlined above with impairments and limitations including impaired balance, decreased functional mobility tolerance, decreased safety awareness, impaired judgement, fall risk, orthopedic restrictions and decreased skin integrity  The following objective measures performed on IE also reveal limitations: Modified [place]: 4 (moderate/severe disability)  Pt's clinical presentation is currently evolving seen in pt's presentation of ongoing infection requiring further amputation  Orthopedics requested trial of knee scooter, but pt states he would prefer axillary crutches  Initial attempts to stand completed with RW for maximal support and pt does not attempt to maintain RLE NWB  Progression of assistive devices and mobility not attempted on this date due to inability to maintain RLE NWB and refusals to attempt  Pt will need to attempt to maintain RLE NWB for successful transfers with either axillary crutches or knee scooter  Will progress assistive devices as able  Pt to benefit from continued PT tx to address deficits as defined above and maximize level of functional independent mobility and consistency  From PT/mobility standpoint, recommendation at time of d/c would be STR pending progress in order to facilitate return to PLOF  Goals   Patient Goals "to go home"   STG Expiration Date 09/10/20   Short Term Goal #1 In 10 days, 1   Patient will perform all bed mobility tasks independently to return to PLOF  2  Patient will complete all functional transfers with least restrictive AD MI to return to prior living environment  3  Patient will ambulate 75 ft with least restrictive AD MI to return to prior living environment  4  Patient will ascend/descend 3 steps without rails with supervision to return to prior living environment  Plan   Treatment/Interventions Functional transfer training;Elevations; Therapeutic exercise;Patient/family training;Equipment eval/education; Bed mobility;Gait training   PT Frequency Other (Comment)  (3-5x/week)   Recommendation   PT Discharge Recommendation Post-Acute Rehabilitation Services   Modified [place] Scale   Modified [place] Scale 4     Pt supine in bed with all needs in reach at end of session

## 2020-09-01 LAB
ANION GAP SERPL CALCULATED.3IONS-SCNC: 9 MMOL/L (ref 4–13)
BASOPHILS # BLD AUTO: 0.08 THOUSANDS/ΜL (ref 0–0.1)
BASOPHILS NFR BLD AUTO: 1 % (ref 0–1)
BUN SERPL-MCNC: 10 MG/DL (ref 5–25)
CALCIUM SERPL-MCNC: 9.2 MG/DL (ref 8.3–10.1)
CHLORIDE SERPL-SCNC: 102 MMOL/L (ref 100–108)
CO2 SERPL-SCNC: 25 MMOL/L (ref 21–32)
CREAT SERPL-MCNC: 0.82 MG/DL (ref 0.6–1.3)
EOSINOPHIL # BLD AUTO: 0.46 THOUSAND/ΜL (ref 0–0.61)
EOSINOPHIL NFR BLD AUTO: 5 % (ref 0–6)
ERYTHROCYTE [DISTWIDTH] IN BLOOD BY AUTOMATED COUNT: 11.9 % (ref 11.6–15.1)
GFR SERPL CREATININE-BSD FRML MDRD: 98 ML/MIN/1.73SQ M
GLUCOSE SERPL-MCNC: 119 MG/DL (ref 65–140)
GLUCOSE SERPL-MCNC: 119 MG/DL (ref 65–140)
GLUCOSE SERPL-MCNC: 127 MG/DL (ref 65–140)
GLUCOSE SERPL-MCNC: 147 MG/DL (ref 65–140)
GLUCOSE SERPL-MCNC: 282 MG/DL (ref 65–140)
HCT VFR BLD AUTO: 33.3 % (ref 36.5–49.3)
HGB BLD-MCNC: 11.1 G/DL (ref 12–17)
IMM GRANULOCYTES # BLD AUTO: 0.05 THOUSAND/UL (ref 0–0.2)
IMM GRANULOCYTES NFR BLD AUTO: 1 % (ref 0–2)
LYMPHOCYTES # BLD AUTO: 2.35 THOUSANDS/ΜL (ref 0.6–4.47)
LYMPHOCYTES NFR BLD AUTO: 26 % (ref 14–44)
MCH RBC QN AUTO: 29.9 PG (ref 26.8–34.3)
MCHC RBC AUTO-ENTMCNC: 33.3 G/DL (ref 31.4–37.4)
MCV RBC AUTO: 90 FL (ref 82–98)
MONOCYTES # BLD AUTO: 1.07 THOUSAND/ΜL (ref 0.17–1.22)
MONOCYTES NFR BLD AUTO: 12 % (ref 4–12)
NEUTROPHILS # BLD AUTO: 5.06 THOUSANDS/ΜL (ref 1.85–7.62)
NEUTS SEG NFR BLD AUTO: 55 % (ref 43–75)
NRBC BLD AUTO-RTO: 0 /100 WBCS
PLATELET # BLD AUTO: 403 THOUSANDS/UL (ref 149–390)
PMV BLD AUTO: 10.2 FL (ref 8.9–12.7)
POTASSIUM SERPL-SCNC: 3.5 MMOL/L (ref 3.5–5.3)
RBC # BLD AUTO: 3.71 MILLION/UL (ref 3.88–5.62)
SODIUM SERPL-SCNC: 136 MMOL/L (ref 136–145)
WBC # BLD AUTO: 9.07 THOUSAND/UL (ref 4.31–10.16)

## 2020-09-01 PROCEDURE — 99232 SBSQ HOSP IP/OBS MODERATE 35: CPT | Performed by: INTERNAL MEDICINE

## 2020-09-01 PROCEDURE — 93926 LOWER EXTREMITY STUDY: CPT | Performed by: SURGERY

## 2020-09-01 PROCEDURE — 99231 SBSQ HOSP IP/OBS SF/LOW 25: CPT | Performed by: NURSE PRACTITIONER

## 2020-09-01 PROCEDURE — 80048 BASIC METABOLIC PNL TOTAL CA: CPT | Performed by: STUDENT IN AN ORGANIZED HEALTH CARE EDUCATION/TRAINING PROGRAM

## 2020-09-01 PROCEDURE — 82948 REAGENT STRIP/BLOOD GLUCOSE: CPT

## 2020-09-01 PROCEDURE — 87040 BLOOD CULTURE FOR BACTERIA: CPT | Performed by: INTERNAL MEDICINE

## 2020-09-01 PROCEDURE — 93922 UPR/L XTREMITY ART 2 LEVELS: CPT | Performed by: SURGERY

## 2020-09-01 PROCEDURE — 85025 COMPLETE CBC W/AUTO DIFF WBC: CPT | Performed by: STUDENT IN AN ORGANIZED HEALTH CARE EDUCATION/TRAINING PROGRAM

## 2020-09-01 RX ORDER — ALPRAZOLAM 0.25 MG/1
0.25 TABLET ORAL ONCE
Status: COMPLETED | OUTPATIENT
Start: 2020-09-01 | End: 2020-09-01

## 2020-09-01 RX ADMIN — ASPIRIN 81 MG 81 MG: 81 TABLET ORAL at 08:30

## 2020-09-01 RX ADMIN — INSULIN LISPRO 3 UNITS: 100 INJECTION, SOLUTION INTRAVENOUS; SUBCUTANEOUS at 21:15

## 2020-09-01 RX ADMIN — INSULIN LISPRO 15 UNITS: 100 INJECTION, SOLUTION INTRAVENOUS; SUBCUTANEOUS at 16:54

## 2020-09-01 RX ADMIN — VERAPAMIL HYDROCHLORIDE 240 MG: 120 TABLET, FILM COATED, EXTENDED RELEASE ORAL at 08:30

## 2020-09-01 RX ADMIN — VANCOMYCIN HYDROCHLORIDE 2000 MG: 1 INJECTION, POWDER, LYOPHILIZED, FOR SOLUTION INTRAVENOUS at 07:14

## 2020-09-01 RX ADMIN — ENOXAPARIN SODIUM 40 MG: 40 INJECTION SUBCUTANEOUS at 08:30

## 2020-09-01 RX ADMIN — ALPRAZOLAM 0.25 MG: 0.25 TABLET ORAL at 11:36

## 2020-09-01 RX ADMIN — INSULIN LISPRO 15 UNITS: 100 INJECTION, SOLUTION INTRAVENOUS; SUBCUTANEOUS at 08:33

## 2020-09-01 RX ADMIN — INSULIN LISPRO 15 UNITS: 100 INJECTION, SOLUTION INTRAVENOUS; SUBCUTANEOUS at 13:13

## 2020-09-01 RX ADMIN — METHIMAZOLE 5 MG: 5 TABLET ORAL at 08:30

## 2020-09-01 RX ADMIN — CLOPIDOGREL BISULFATE 75 MG: 75 TABLET ORAL at 08:31

## 2020-09-01 RX ADMIN — INSULIN GLARGINE 35 UNITS: 100 INJECTION, SOLUTION SUBCUTANEOUS at 21:14

## 2020-09-01 RX ADMIN — VANCOMYCIN HYDROCHLORIDE 2000 MG: 1 INJECTION, POWDER, LYOPHILIZED, FOR SOLUTION INTRAVENOUS at 19:48

## 2020-09-01 RX ADMIN — ATORVASTATIN CALCIUM 40 MG: 40 TABLET, FILM COATED ORAL at 16:54

## 2020-09-01 RX ADMIN — SODIUM CHLORIDE 100 ML/HR: 0.9 INJECTION, SOLUTION INTRAVENOUS at 23:56

## 2020-09-01 NOTE — ASSESSMENT & PLAN NOTE
61-year-old male nonsmoker with obesity, uncontrolled DM (HgbA1C 8 1), HTN, HLD, thyroid CA, GERD and PAD with recent Right popliteal PTA/Stents (IR 8/6) and R hallux amputation now presents with non-healing amputation site, dehiscence of the wound w/cellulitis  Vascular surgery consulted for evaluation of PAD and wound healing potential     Diagnostics:  -8/6 Angiogram with  popliteal with recanalization and stent angioplasty   -8/7 Post-intervention SHANNAN:  R SHANNAN 1 33/111/63; L SHANNAN 1 33/156/144  -MRI right foot 8/28/2020:  Soft tissue cellulitis and OM to 1st/2nd metatarsal   -x-ray right foot 8/28/2020:  OM distal 1st metatarsal  -Wound culture at [address] office: MRSA     -[facility] 8/28: staph aureus  -Repeat [facility] 8/31 pending  -LEAD R popliteal stent patent, SHANNAN NC, GTP 97    Plan:  -Right hallux amputation site with open wound, malodor, eschar and cellulitis of the forefoot  -LEAD showed patent RLE stent   -OK to proceed with podiatry intervention  -Continue ASA/Plavix for stent patency x 6 months   -continue Lipitor for best medical management  -continue antibiotics    On Vanco   -Discussed with SLIM

## 2020-09-01 NOTE — PROGRESS NOTES
Progress Note - Infectious Disease   Selma Carter 60 y o  male MRN: [id number]  Unit/Bed#: -01 Encounter: [id number]      A/P:  1  MRSA bacteremia  Secondary to #2/3  Negative 2D echo  No intravascular prosthetic devices     -continue IV vancomycin with dosing recommendations per pharmacy  -follow-up repeat blood cultures  -likely plan for 4 week course of IV antibiotic  -hold off on PICC line placement ill blood cultures are negative at 72 hours    2  Right foot cellulitis  Suspect secondary to MRSA  Wound culture also shows Morganella, which is not unexpected finding in the setting of chronic ulceration  Would continue targeted therapy for MRSA infection     -antibiotic plan as above  -follow up final wound culture  -serial foot exams    3  Right foot nonhealing ulceration  With residual 1st and 2nd toe osteomyelitis  Podiatry input noted with plan for surgical intervention on Thursday     -antibiotic plan as above  -daily local wound care and dressing change  -plan for OR on Thursday  Will followup operative findings   -close podiatry follow-up ongoing    4  Status post 1st digit amputation on 2020  Complicated by above  5  PAD  Status post recent revascularization  Vascular surgery input noted  6  Uncontrolled diabetes mellitus  Risk factor for severe infection          Subjective:  Feels okay  Tolerating oral intake  Denies fevers, chills, or sweats  Denies nausea, vomiting, or diarrhea        Objective:  Vitals:  Temp:  [97 7 °F (36 5 °C)-98 4 °F (36 9 °C)] 97 7 °F (36 5 °C)  HR:  [75-78] 78  Resp:  [18] 18  BP: (135-156)/(86-90) 156/90  SpO2:  [96 %-97 %] 97 %  Temp (24hrs), Av °F (36 7 °C), Min:97 7 °F (36 5 °C), Max:98 4 °F (36 9 °C)  Current: Temperature: 97 7 °F (36 5 °C)    Physical Exam:   General:  No acute distress  HEENT:  Atraumatic normocephalic  Psychiatric:  Awake and alert  Pulmonary:  Normal respiratory excursion without accessory muscle use  Abdomen: Soft, nontender, obese  Extremities:  No edema  Foot dressing intact with no ascending erythema  Skin:  No rashes    Lab Results:  I have personally reviewed pertinent labs  Results from last 7 days   Lab Units 09/01/20  0657 08/31/20  0615 08/30/20  0407  08/28/20  1558   POTASSIUM mmol/L 3 5 3 9 4 5   < > 4 2   CHLORIDE mmol/L 102 101 105   < > 96*   CO2 mmol/L 25 27 26   < > 26   BUN mg/dL 10 9 11   < > 19   CREATININE mg/dL 0 82 0 82 0 89   < > 0 94   EGFR ml/min/1 73sq m 98 98 95   < > 90   CALCIUM mg/dL 9 2 9 2 8 3   < > 9 1   AST U/L  --   --   --   --  50*   ALT U/L  --   --   --   --  44   ALK PHOS U/L  --   --   --   --  97    < > = values in this interval not displayed  Results from last 7 days   Lab Units 09/01/20  0657 08/31/20  0615 08/30/20  0407   WBC Thousand/uL 9 07 7 89 7 81   HEMOGLOBIN g/dL 11 1* 12 5 10 8*   PLATELETS Thousands/uL 403* 400* 327     Results from last 7 days   Lab Units 08/31/20  0615 08/30/20  1646 08/28/20  1558   BLOOD CULTURE  No Growth at 24 hrs  --  Methicillin Resistant Staphylococcus aureus*  Methicillin Resistant Staphylococcus aureus*   GRAM STAIN RESULT   --  Rare Polys*  2+ Gram negative rods*  2+ Gram positive cocci in pairs* Gram positive cocci in clusters*  Gram positive cocci in clusters*   WOUND CULTURE   --  1+ Growth of Morganella morganii*  2+ Growth of Staphylococcus aureus*  1+ Growth of Gram Negative Fransico*  --        Imaging Studies:   I have personally reviewed pertinent imaging study reports and images in PACS  EKG, Pathology, and Other Studies:   I have personally reviewed pertinent reports

## 2020-09-01 NOTE — ASSESSMENT & PLAN NOTE
2/2 culture from 08/28/2020 growing Staph aureus  Likely source foot wound  Continue IV vancomycin for now  Vascular surgery, Podiatry following  ID recommendations appreciated  Pharmacy consult for vancomycin management

## 2020-09-01 NOTE — PROGRESS NOTES
Progress Note - Charles Dobbs 1965, 60 y o  male MRN: [id number]    Unit/Bed#: -01 Encounter: [id number]    Primary Care Provider: Rusty Mcclellan MD   Date and time admitted to hospital: 8/28/2020  3:14 PM        * Osteomyelitis [facility])  Assessment & Plan  Recent history of right hallux amputation  Presented with wound dehiscence, wound cellulitis, possible osteomyelitis  2/2 blood culture from 08/28/2020 growing MRSA  Right foot MRI report reviewed and noted for osteomyelitis  Currently afebrile, hemodynamically stable  Cleared by vascular surgery for amputation  Encourage for strict nonweightbearing to right lower extremity at this time  Continue dressing changes per podiatrist recommendation  Podiatry notes reviewed  Patient was extremely noncompliant with this foot care previously  As per podiatry patient should agree for rehab refer sitting for surgery  Patient is still thinking about it  Continue current management continue antibiotic as per ID  MRSA bacteremia  Assessment & Plan  2/2 culture from 08/28/2020 growing Staph aureus  Likely source foot wound  Continue IV vancomycin for now  Vascular surgery, Podiatry following  ID recommendations appreciated  Pharmacy consult for vancomycin management  Amputation of right great toe [facility])  Assessment & Plan  Now presented again with poor wound healing, wound dehiscence, possible cellulitis, osteomyelitis  Plan is as stated above under 1  And 2  PAD (peripheral artery disease) [facility])  Assessment & Plan  Patient history of recent right popliteal PTA/ stent 08/06/2020 and right hallux amputation  Now noted with osteomyelitis as evident on right foot MRI report  Cleared by vascular surgery for further debridement and/or amputation  Continue aspirin, statin, Plavix  Follow-up with vascular surgery recommendation as well as podiatry following        Hyperthyroidism  Assessment & Plan  · Diagnosed with thyroid cancer in July    · TSH 0 080  · Free T4 1 39  · Continue home dose of methimazole 5 mg daily  · Recommended close outpatient follow-up  Type 2 diabetes mellitus with diabetic polyneuropathy, with long-term current use of insulin [facility])  Assessment & Plan  Lab Results   Component Value Date    HGBA1C 8 1 (H) 08/28/2020       Recent Labs     08/31/20  1128 08/31/20  1612 08/31/20  2108 09/01/20  0705   POCGLU 195* 243* 211* 119       Blood Sugar Average: Last 72 hrs:  · (P) 161     Uncontrolled  Hemoglobin A1c 8 1   · Home medication includes lantus 80u at night as per home dose  · Home med includes short-acting 40 units with meals  Blood sugars well controlled with current regimen of Lantus 35 units q h s  And Humalog 15 with meals  Continue sliding scale  Continue to monitor sugars  Continue to monitor for hypoglycemia    Hyperlipidemia  Assessment & Plan  · Continue statin    Gastroesophageal reflux disease without esophagitis  Assessment & Plan  · Continue PPI    Hypertension, essential  Assessment & Plan  · Controlled  · Continue anti-HTN meds          VTE Pharmacologic Prophylaxis:   Pharmacologic: Enoxaparin (Lovenox)  Mechanical VTE Prophylaxis in Place: Yes    Patient Centered Rounds: I have performed bedside rounds with nursing staff today  Discussions with Specialists or Other Care Team Provider:     Education and Discussions with Family / Patient:  Patient significant other at the bedside    Time Spent for Care: 30 minutes  More than 50% of total time spent on counseling and coordination of care as described above  Current Length of Stay: 4 day(s)    Current Patient Status: Inpatient   Certification Statement: The patient will continue to require additional inpatient hospital stay due to Above medical problems    Discharge Plan:  In next 48-72 hours if planning for surgery    Code Status: Level 1 - Full Code      Subjective:   Patient seen and examined  Denies any acute complaints    Reports he is anxious requesting something for that  Foot pain is controlled  He is still not sure if he wants to go for surgery    Objective:     Vitals:   Temp (24hrs), Av °F (36 7 °C), Min:97 7 °F (36 5 °C), Max:98 4 °F (36 9 °C)    Temp:  [97 7 °F (36 5 °C)-98 4 °F (36 9 °C)] 97 7 °F (36 5 °C)  HR:  [75-78] 78  Resp:  [18] 18  BP: (135-156)/(86-90) 156/90  SpO2:  [96 %-97 %] 97 %  Body mass index is 30 89 kg/m²  Input and Output Summary (last 24 hours): Intake/Output Summary (Last 24 hours) at 2020 1307  Last data filed at 2020 1002  Gross per 24 hour   Intake 220 ml   Output 300 ml   Net -80 ml       Physical Exam:     Physical Exam  Vitals signs and nursing note reviewed  Constitutional:       General: He is not in acute distress  Appearance: Normal appearance  HENT:      Head: Normocephalic and atraumatic  Eyes:      Extraocular Movements: Extraocular movements intact  Neck:      Musculoskeletal: Normal range of motion  Cardiovascular:      Rate and Rhythm: Normal rate and regular rhythm  Pulses: Normal pulses  Heart sounds: Normal heart sounds  Pulmonary:      Effort: Pulmonary effort is normal  No respiratory distress  Breath sounds: Normal breath sounds  Abdominal:      General: Abdomen is flat  Bowel sounds are normal       Palpations: Abdomen is soft  Musculoskeletal:      Comments: Foot Dressing intact   Neurological:      Mental Status: He is alert             Additional Data:     Labs:    Results from last 7 days   Lab Units 20  0657   WBC Thousand/uL 9 07   HEMOGLOBIN g/dL 11 1*   HEMATOCRIT % 33 3*   PLATELETS Thousands/uL 403*   NEUTROS PCT % 55   LYMPHS PCT % 26   MONOS PCT % 12   EOS PCT % 5     Results from last 7 days   Lab Units 20  0657  20  1558   SODIUM mmol/L 136   < > 133*   POTASSIUM mmol/L 3 5   < > 4 2   CHLORIDE mmol/L 102   < > 96*   CO2 mmol/L 25   < > 26   BUN mg/dL 10   < > 19   CREATININE mg/dL 0 82   < > 0 94   ANION GAP mmol/L 9   < > 11   CALCIUM mg/dL 9 2   < > 9 1   ALBUMIN g/dL  --   --  3 0*   TOTAL BILIRUBIN mg/dL  --   --  0 50   ALK PHOS U/L  --   --  97   ALT U/L  --   --  44   AST U/L  --   --  50*   GLUCOSE RANDOM mg/dL 119   < > 182*    < > = values in this interval not displayed  Results from last 7 days   Lab Units 09/01/20  0705 08/31/20  2108 08/31/20  1612 08/31/20  1128 08/31/20  0615 08/30/20  2048 08/30/20  1600 08/30/20  1211 08/30/20  0838 08/30/20  0742 08/29/20  2117 08/29/20  1609   POC GLUCOSE mg/dl 119 211* 243* 195* 105 247* 160* 115 110 102 213* 184*     Results from last 7 days   Lab Units 08/28/20  1558   HEMOGLOBIN A1C % 8 1*     Results from last 7 days   Lab Units 08/28/20  1738 08/28/20  1558   LACTIC ACID mmol/L 1 3 2 2*           * I Have Reviewed All Lab Data Listed Above  * Additional Pertinent Lab Tests Reviewed: Pradeep 66 Admission Reviewed    Imaging:    Imaging Reports Reviewed Today Include:  Lower extremity Dopplers MRI of the foot  Imaging Personally Reviewed by Myself Includes:      Recent Cultures (last 7 days):     Results from last 7 days   Lab Units 08/31/20  0615 08/30/20  1646 08/28/20  1558   BLOOD CULTURE  No Growth at 24 hrs    --  Methicillin Resistant Staphylococcus aureus*  Methicillin Resistant Staphylococcus aureus*   GRAM STAIN RESULT   --  Rare Polys*  2+ Gram negative rods*  2+ Gram positive cocci in pairs* Gram positive cocci in clusters*  Gram positive cocci in clusters*   WOUND CULTURE   --  1+ Growth of Morganella morganii*  2+ Growth of Staphylococcus aureus*  1+ Growth of Gram Negative Fransico*  --        Last 24 Hours Medication List:   Current Facility-Administered Medications   Medication Dose Route Frequency Provider Last Rate    acetaminophen  650 mg Oral Q6H PRN Helen Mendoza PA-C      aluminum-magnesium hydroxide-simethicone  30 mL Oral Q6H PRN Aníbal Angel PA-C      aspirin  81 mg Oral Daily Aníbal Angel PA-C  atorvastatin  40 mg Oral Daily With Luc[employer] TechnologiesGABRIEL      clopidogrel  75 mg Oral Daily Helen Mendoza PA-C      enoxaparin  40 mg Subcutaneous Q24H [address] Serafin YAN MD      insulin glargine  35 Units Subcutaneous HS Serafin YAN MD      insulin lispro  1-5 Units Subcutaneous 4x Daily (AC & HS) Helen Mendoza PA-C      insulin lispro  15 Units Subcutaneous TID With Meals José Miguel YAN MD      LORazepam  0 5 mg Oral BID PRN Monalisa NovemberGABRIEL      methimazole  5 mg Oral Daily Helen Mendoza PA-C      ondansetron  4 mg Intravenous Q6H PRN Helen Mendoza PA-C      pantoprazole  20 mg Oral Early Morning Helen Mendoza PA-C      polyethylene glycol  17 g Oral Daily PRN Monalisa NovemberGABRIEL      sodium chloride  100 mL/hr Intravenous Continuous José Miguel YAN  mL/hr (08/31/20 1458)    vancomycin  2,000 mg Intravenous Q12H Serafin YAN MD 2,000 mg (09/01/20 0714)    verapamil  240 mg Oral Daily Helen Mendoza PA-C      zolpidem  10 mg Oral HS PRN Monalisa November, GABRIEL          Today, Patient Was Seen By: Sunni Lainez MD    ** Please Note: Dictation voice to text software may have been used in the creation of this document   **

## 2020-09-01 NOTE — PLAN OF CARE
Problem: Potential for Falls  Goal: Patient will remain free of falls  Description: INTERVENTIONS:  - Assess patient frequently for physical needs  -  Identify cognitive and physical deficits and behaviors that affect risk of falls    -  [place] fall precautions as indicated by assessment   - Educate patient/family on patient safety including physical limitations  - Instruct patient to call for assistance with activity based on assessment  - Modify environment to reduce risk of injury  - Consider OT/PT consult to assist with strengthening/mobility  9/1/2020 0145 by Gi Pearce RN  Outcome: Dillan Major  9/1/2020 0047 by Gi Pearce RN  Outcome: Not Progressing     Problem: PAIN - ADULT  Goal: Verbalizes/displays adequate comfort level or baseline comfort level  Description: Interventions:  - Encourage patient to monitor pain and request assistance  - Assess pain using appropriate pain scale  - Administer analgesics based on type and severity of pain and evaluate response  - Implement non-pharmacological measures as appropriate and evaluate response  - Consider cultural and social influences on pain and pain management  - Notify physician/advanced practitioner if interventions unsuccessful or patient reports new pain  9/1/2020 0145 by Gi Pearce RN  Outcome: Progressing  9/1/2020 0047 by Gi Pearce RN  Outcome: Not Progressing     Problem: INFECTION - ADULT  Goal: Absence or prevention of progression during hospitalization  Description: INTERVENTIONS:  - Assess and monitor for signs and symptoms of infection  - Monitor lab/diagnostic results  - Monitor all insertion sites, i e  indwelling lines, tubes, and drains  - Monitor endotracheal if appropriate and nasal secretions for changes in amount and color  - [place] appropriate cooling/warming therapies per order  - Administer medications as ordered  - Instruct and encourage patient and family to use good hand hygiene technique  - Identify and instruct in appropriate isolation precautions for identified infection/condition  2020 by Corinne Sanabria RN  Outcome: Progressing  2020 by Corinne Sanabria RN  Outcome: Not Progressing  Goal: Absence of fever/infection during neutropenic period  Description: INTERVENTIONS:  - Monitor WBC    2020 by Corinne Sanabria RN  Outcome: Progressing  2020 by Corinne Sanabria RN  Outcome: Not Progressing     Problem: SAFETY ADULT  Goal: Patient will remain free of falls  Description: INTERVENTIONS:  - Assess patient frequently for physical needs  -  Identify cognitive and physical deficits and behaviors that affect risk of falls    -  [place] fall precautions as indicated by assessment   - Educate patient/family on patient safety including physical limitations  - Instruct patient to call for assistance with activity based on assessment  - Modify environment to reduce risk of injury  - Consider OT/PT consult to assist with strengthening/mobility  2020 by Corinne Sanabria RN  Outcome: Progressing  2020 by Corinne Sanabria RN  Outcome: Not Progressing  Goal: Maintain or return to baseline ADL function  Description: INTERVENTIONS:  -  Assess patient's ability to carry out ADLs; assess patient's baseline for ADL function and identify physical deficits which impact ability to perform ADLs (bathing, care of mouth/teeth, toileting, grooming, dressing, etc )  - Assess/evaluate cause of self-care deficits   - Assess range of motion  - Assess patient's mobility; develop plan if impaired  - Assess patient's need for assistive devices and provide as appropriate  - Encourage maximum independence but intervene and supervise when necessary  - Involve family in performance of ADLs  - Assess for home care needs following discharge   - Consider OT consult to assist with ADL evaluation and planning for discharge  - Provide patient education as appropriate  2020 by Corinne Sanabria RN  Outcome: Progressing  9/1/2020 0047 by Malini Coel RN  Outcome: Not Progressing  Goal: Maintain or return mobility status to optimal level  Description: INTERVENTIONS:  - Assess patient's baseline mobility status (ambulation, transfers, stairs, etc )    - Identify cognitive and physical deficits and behaviors that affect mobility  - Identify mobility aids required to assist with transfers and/or ambulation (gait belt, sit-to-stand, lift, walker, cane, etc )  - [place] fall precautions as indicated by assessment  - Record patient progress and toleration of activity level on Mobility SBAR; progress patient to next Phase/Stage  - Instruct patient to call for assistance with activity based on assessment  - Consider rehabilitation consult to assist with strengthening/weightbearing, etc   9/1/2020 0145 by Malini Cole RN  Outcome: Progressing  9/1/2020 0047 by Malini Cole RN  Outcome: Not Progressing     Problem: DISCHARGE PLANNING  Goal: Discharge to home or other facility with appropriate resources  Description: INTERVENTIONS:  - Identify barriers to discharge w/patient and caregiver  - Arrange for needed discharge resources and transportation as appropriate  - Identify discharge learning needs (meds, wound care, etc )  - Arrange for interpretive services to assist at discharge as needed  - Refer to Case Management Department for coordinating discharge planning if the patient needs post-hospital services based on physician/advanced practitioner order or complex needs related to functional status, cognitive ability, or social support system  9/1/2020 0145 by Malini Cole RN  Outcome: Progressing  9/1/2020 0047 by Malini Cole RN  Outcome: Not Progressing     Problem: Nutrition/Hydration-ADULT  Goal: Nutrient/Hydration intake appropriate for improving, restoring or maintaining nutritional needs  Description: Monitor and assess patient's nutrition/hydration status for malnutrition   Collaborate with interdisciplinary team and initiate plan and interventions as ordered  Monitor patient's weight and dietary intake as ordered or per policy  Utilize nutrition screening tool and intervene as necessary  Determine patient's food preferences and provide high-protein, high-caloric foods as appropriate       INTERVENTIONS:  - Monitor oral intake, urinary output, labs, and treatment plans  - Assess nutrition and hydration status and recommend course of action  - Evaluate amount of meals eaten  - Assist patient with eating if necessary   - Allow adequate time for meals  - Recommend/ encourage appropriate diets, oral nutritional supplements, and vitamin/mineral supplements  - Order, calculate, and assess calorie counts as needed  - Recommend, monitor, and adjust tube feedings and TPN/PPN based on assessed needs  - Assess need for intravenous fluids  - Provide specific nutrition/hydration education as appropriate  - Include patient/family/caregiver in decisions related to nutrition  9/1/2020 0145 by Danny Watts RN  Outcome: Progressing  9/1/2020 0047 by Danny Watts, RN  Outcome: Not Progressing

## 2020-09-01 NOTE — ASSESSMENT & PLAN NOTE
Lab Results   Component Value Date    HGBA1C 8 1 (H) 08/28/2020       Recent Labs     08/31/20  1128 08/31/20  1612 08/31/20  2108 09/01/20  0705   POCGLU 195* 243* 211* 119       Blood Sugar Average: Last 72 hrs:  · (P) 161     Uncontrolled  Hemoglobin A1c 8 1   · Home medication includes lantus 80u at night as per home dose  · Home med includes short-acting 40 units with meals  Blood sugars well controlled with current regimen of Lantus 35 units q h s   And Humalog 15 with meals  Continue sliding scale  Continue to monitor sugars  Continue to monitor for hypoglycemia no

## 2020-09-01 NOTE — ASSESSMENT & PLAN NOTE
Lab Results   Component Value Date    HGBA1C 8 1 (H) 08/28/2020       Recent Labs     08/31/20  1128 08/31/20  1612 08/31/20  2108 09/01/20  0705   POCGLU 195* 243* 211* 119       Blood Sugar Average: Last 72 hrs:  (P) 161     -continue to optimize BS control for optimization of wound healing and decrease SSI  -management per [facility]

## 2020-09-01 NOTE — PROGRESS NOTES
Vancomycin IV Pharmacy-to-Dose Consultation    Anselmo Roland is a 60 y o  male who is currently receiving Vancomycin IV with management by the Pharmacy Consult service  Assessment/Plan:  The patient was reviewed  Renal function is stable and no signs or symptoms of nephrotoxicity and/or infusion reactions were documented in the chart  Based on todays assessment, continue current vancomycin (day # 6) dosing of vancomycin 2000 mg IV q12h, with a plan for trough to be drawn on 9/2/20 at 0630  We will continue to follow the patients culture results and clinical progress daily      Asa Rom, Pharmacist

## 2020-09-01 NOTE — PLAN OF CARE
Problem: Potential for Falls  Goal: Patient will remain free of falls  Description: INTERVENTIONS:  - Assess patient frequently for physical needs  -  Identify cognitive and physical deficits and behaviors that affect risk of falls    -  [place] fall precautions as indicated by assessment   - Educate patient/family on patient safety including physical limitations  - Instruct patient to call for assistance with activity based on assessment  - Modify environment to reduce risk of injury  - Consider OT/PT consult to assist with strengthening/mobility  Outcome: Progressing     Problem: PAIN - ADULT  Goal: Verbalizes/displays adequate comfort level or baseline comfort level  Description: Interventions:  - Encourage patient to monitor pain and request assistance  - Assess pain using appropriate pain scale  - Administer analgesics based on type and severity of pain and evaluate response  - Implement non-pharmacological measures as appropriate and evaluate response  - Consider cultural and social influences on pain and pain management  - Notify physician/advanced practitioner if interventions unsuccessful or patient reports new pain  Outcome: Progressing     Problem: INFECTION - ADULT  Goal: Absence or prevention of progression during hospitalization  Description: INTERVENTIONS:  - Assess and monitor for signs and symptoms of infection  - Monitor lab/diagnostic results  - Monitor all insertion sites, i e  indwelling lines, tubes, and drains  - Monitor endotracheal if appropriate and nasal secretions for changes in amount and color  - [place] appropriate cooling/warming therapies per order  - Administer medications as ordered  - Instruct and encourage patient and family to use good hand hygiene technique  - Identify and instruct in appropriate isolation precautions for identified infection/condition  Outcome: Progressing  Goal: Absence of fever/infection during neutropenic period  Description: INTERVENTIONS:  - Monitor WBC    Outcome: Progressing     Problem: SAFETY ADULT  Goal: Patient will remain free of falls  Description: INTERVENTIONS:  - Assess patient frequently for physical needs  -  Identify cognitive and physical deficits and behaviors that affect risk of falls    -  [place] fall precautions as indicated by assessment   - Educate patient/family on patient safety including physical limitations  - Instruct patient to call for assistance with activity based on assessment  - Modify environment to reduce risk of injury  - Consider OT/PT consult to assist with strengthening/mobility  Outcome: Progressing  Goal: Maintain or return to baseline ADL function  Description: INTERVENTIONS:  -  Assess patient's ability to carry out ADLs; assess patient's baseline for ADL function and identify physical deficits which impact ability to perform ADLs (bathing, care of mouth/teeth, toileting, grooming, dressing, etc )  - Assess/evaluate cause of self-care deficits   - Assess range of motion  - Assess patient's mobility; develop plan if impaired  - Assess patient's need for assistive devices and provide as appropriate  - Encourage maximum independence but intervene and supervise when necessary  - Involve family in performance of ADLs  - Assess for home care needs following discharge   - Consider OT consult to assist with ADL evaluation and planning for discharge  - Provide patient education as appropriate  Outcome: Progressing  Goal: Maintain or return mobility status to optimal level  Description: INTERVENTIONS:  - Assess patient's baseline mobility status (ambulation, transfers, stairs, etc )    - Identify cognitive and physical deficits and behaviors that affect mobility  - Identify mobility aids required to assist with transfers and/or ambulation (gait belt, sit-to-stand, lift, walker, cane, etc )  - [place] fall precautions as indicated by assessment  - Record patient progress and toleration of activity level on Mobility SBAR; progress patient to next Phase/Stage  - Instruct patient to call for assistance with activity based on assessment  - Consider rehabilitation consult to assist with strengthening/weightbearing, etc   Outcome: Progressing     Problem: DISCHARGE PLANNING  Goal: Discharge to home or other facility with appropriate resources  Description: INTERVENTIONS:  - Identify barriers to discharge w/patient and caregiver  - Arrange for needed discharge resources and transportation as appropriate  - Identify discharge learning needs (meds, wound care, etc )  - Arrange for interpretive services to assist at discharge as needed  - Refer to Case Management Department for coordinating discharge planning if the patient needs post-hospital services based on physician/advanced practitioner order or complex needs related to functional status, cognitive ability, or social support system  Outcome: Progressing     Problem: Knowledge Deficit  Goal: Patient/family/caregiver demonstrates understanding of disease process, treatment plan, medications, and discharge instructions  Description: Complete learning assessment and assess knowledge base  Interventions:  - Provide teaching at level of understanding  - Provide teaching via preferred learning methods  Outcome: Progressing     Problem: Nutrition/Hydration-ADULT  Goal: Nutrient/Hydration intake appropriate for improving, restoring or maintaining nutritional needs  Description: Monitor and assess patient's nutrition/hydration status for malnutrition  Collaborate with interdisciplinary team and initiate plan and interventions as ordered  Monitor patient's weight and dietary intake as ordered or per policy  Utilize nutrition screening tool and intervene as necessary  Determine patient's food preferences and provide high-protein, high-caloric foods as appropriate       INTERVENTIONS:  - Monitor oral intake, urinary output, labs, and treatment plans  - Assess nutrition and hydration status and recommend course of action  - Evaluate amount of meals eaten  - Assist patient with eating if necessary   - Allow adequate time for meals  - Recommend/ encourage appropriate diets, oral nutritional supplements, and vitamin/mineral supplements  - Order, calculate, and assess calorie counts as needed  - Recommend, monitor, and adjust tube feedings and TPN/PPN based on assessed needs  - Assess need for intravenous fluids  - Provide specific nutrition/hydration education as appropriate  - Include patient/family/caregiver in decisions related to nutrition  Outcome: Progressing

## 2020-09-01 NOTE — ASSESSMENT & PLAN NOTE
49-year-old male nonsmoker with obesity, uncontrolled DM (HgbA1C 8 1), HTN, HLD, thyroid CA, GERD and PAD with recent Right popliteal PTA/Stents (IR 8/6) and R hallux amputation now presents with non-healing amputation site, dehiscence of the wound w/cellulitis  Vascular surgery consulted for evaluation of PAD and wound healing potential     Diagnostics:  -8/6 Angiogram with  popliteal with recanalization and stent angioplasty   -8/7 Post-intervention SHANNAN:  R SHANNAN 1 33/111/63; L SHANNAN 1 33/156/144  -MRI right foot 8/28/2020:  Soft tissue cellulitis and OM to 1st/2nd metatarsal   -x-ray right foot 8/28/2020:  OM distal 1st metatarsal  -Wound culture at [address] office: MRSA     -[facility] 8/28: staph aureus  -Repeat [facility] 8/31 pending  -LEAD R popliteal stent patent, SHANNAN NC, GTP 97    Plan:  -Right hallux amputation site with open wound, malodor, eschar and cellulitis of the forefoot  -LEAD showed patent RLE stent   -OK to proceed with podiatry intervention  -Continue ASA/Plavix for stent patency x 6 months   -continue Lipitor for best medical management  -continue antibiotics    On Vanco   -Discussed with SLIM  -Will sign off   -Follow up as an outpatient

## 2020-09-01 NOTE — ASSESSMENT & PLAN NOTE
Recent history of right hallux amputation  Presented with wound dehiscence, wound cellulitis, possible osteomyelitis  2/2 blood culture from 08/28/2020 growing MRSA  Right foot MRI report reviewed and noted for osteomyelitis  Currently afebrile, hemodynamically stable  Cleared by vascular surgery for amputation  Encourage for strict nonweightbearing to right lower extremity at this time  Continue dressing changes per podiatrist recommendation  Podiatry notes reviewed  Patient was extremely noncompliant with this foot care previously  As per podiatry patient should agree for rehab refer sitting for surgery  Patient is still thinking about it  Continue current management continue antibiotic as per ID

## 2020-09-01 NOTE — PROGRESS NOTES
Progress Note - Mckinley Stoner 60 y o  male MRN: [id number]    Unit/Bed#: -18 Encounter: [id number]      Assessment:  1) Osteomyelitis of the right foot first metatarsal, second metatarsal and second digit  2) PAD  3) Nonhealing ulcer to bone right foot first digit amputation site  4) Cellulitis to the right foot      Plan:  1) Wound culture is taken and sent for cultures   1+ Growth of Morganella morganiiAbnormal         2+ Growth of Staphylococcus aureusAbnormal         1+ Growth of Gram Negative RodAbnormal            2) Dressed with betadine and DSD  3) Nonweightbearing right foot  4) MRI- shows osteomyelitis of the left foot 2nd metatarsal and patient will need a transmetatarsal amputation at this time  5)Vascular has given approval for surgery  6) Patient is currently noncompliant with  nonweightbearing of the right foot, patient MUST BE AGREEABLE TO REHAB AFTER SURGERY FOR SURGERY TO BE SUCCESSFUL, we cannot perform the surgery successfully or safely without rehab placement and compliance with nonweightbearing   This is discussed at length with the patient   This is discussed with Case Work as well     Subjective:   Patient is seen bedside resting comfortably  NAD  Objective:     Vitals: Blood pressure 156/90, pulse 78, temperature 97 7 °F (36 5 °C), temperature source Oral, resp  rate 18, height 6' 4" (1 93 m), weight 115 kg (253 lb 12 oz), SpO2 97 %  ,Body mass index is 30 89 kg/m²        Intake/Output Summary (Last 24 hours) at 9/1/2020 1002  Last data filed at 9/1/2020 0329  Gross per 24 hour   Intake 450 ml   Output 300 ml   Net 150 ml       Physical Exam: Patient is awake alert and oriented x 3  Neurological: No babinski, no POP, no sensation  Orthopedic: Previous amputation to the right great toe and partial resection of the right fifth metatarsal  Vascular: nonpalpable pulses, no pedal hair, thin shiny skin  Dermatological: Open lesion to the right foot first digit amp site 3 cm x 6 cm x 0 5 cm, probes to bone, slough and eschar, positive malodor, positive purulence, erythema to the right midfoot     Invasive Devices     Peripheral Intravenous Line            Peripheral IV 08/31/20 Left;Ventral (anterior) Forearm 1 day              IMPRESSION:     Soft tissue wound/ulcer adjacent to distal first metatarsal with underlying phlegmonous cellulitis with a thin tapering tail extending towards the distal residual flexor hallucis longus tendon      New osteomyelitis of the distal residual first metatarsal tapering proximally to the level of the base  Reactive changes in the hallux tibial sesamoid      New osteomyelitis medial aspect of the second metatarsal head and base of the second proximal phalanx

## 2020-09-01 NOTE — PROGRESS NOTES
Progress Note - Rupa Ortez 1965, 60 y o  male MRN: [id number]    Unit/Bed#: -18 Encounter: [id number]    Primary Care Provider: Nela Lai MD   Date and time admitted to hospital: 8/28/2020  3:14 PM        PAD (peripheral artery disease) [facility])  Assessment & Plan  49-year-old male nonsmoker with obesity, uncontrolled DM (HgbA1C 8 1), HTN, HLD, thyroid CA, GERD and PAD with recent Right popliteal PTA/Stents (IR 8/6) and R hallux amputation now presents with non-healing amputation site, dehiscence of the wound w/cellulitis  Vascular surgery consulted for evaluation of PAD and wound healing potential     Diagnostics:  -8/6 Angiogram with  popliteal with recanalization and stent angioplasty   -8/7 Post-intervention SHANNAN:  R SHANNAN 1 33/111/63; L SHANNAN 1 33/156/144  -MRI right foot 8/28/2020:  Soft tissue cellulitis and OM to 1st/2nd metatarsal   -x-ray right foot 8/28/2020:  OM distal 1st metatarsal  -Wound culture at [address] [place] office: MRSA     -[facility] 8/28: staph aureus  -Repeat [facility] 8/31 pending  -LEAD R popliteal stent patent, SHANNAN NC, GTP 97    Plan:  -Right hallux amputation site with open wound, malodor, eschar and cellulitis of the forefoot  -LEAD showed patent RLE stent   -OK to proceed with podiatry intervention  -Continue ASA/Plavix for stent patency x 6 months   -continue Lipitor for best medical management  -continue antibiotics  On Vanco   -Discussed with SLIM  -Will sign off   -Follow up as an outpatient            Subjective:  Patient in bed  NAD  Patient irritated/frustrated with foot wound and need for surgery  Denies pain  Afebrile  Hemodynamics stable  LEAD showed patent R popliteal stent     Vitals:  /90 (BP Location: Right arm)   Pulse 78   Temp 97 7 °F (36 5 °C) (Oral)   Resp 18   Ht 6' 4" (1 93 m)   Wt 115 kg (253 lb 12 oz)   SpO2 97%   BMI 30 89 kg/m²     I/Os:  I/O last 3 completed shifts: In: 2141 7 [P O :160; I V :1981 7]  Out: 2250 [Urine:2250]  I/O this shift:   In: 120 [P O :120]  Out: -     Lab Results and Cultures:   Lab Results   Component Value Date    WBC 9 07 09/01/2020    HGB 11 1 (L) 09/01/2020    HCT 33 3 (L) 09/01/2020    MCV 90 09/01/2020     (H) 09/01/2020     Lab Results   Component Value Date    CALCIUM 9 2 09/01/2020    K 3 5 09/01/2020    CO2 25 09/01/2020     09/01/2020    BUN 10 09/01/2020    CREATININE 0 82 09/01/2020     Lab Results   Component Value Date    INR 1 04 08/04/2020    INR 0 99 11/15/2018    PROTIME 13 8 08/04/2020    PROTIME 13 0 11/15/2018        Blood Culture:   Lab Results   Component Value Date    BLOODCX No Growth at 24 hrs  08/31/2020   ,   Urinalysis:   Lab Results   Component Value Date    COLORU Yellow 09/27/2019    CLARITYU Clear 09/27/2019    SPECGRAV 1 020 09/27/2019    PHUR 5 5 09/27/2019    PHUR 6 0 11/15/2018    LEUKOCYTESUR (A) 09/27/2019     Elevated glucose may cause decreased leukocyte values   See urine microscopic for [facility] result/    NITRITE Negative 09/27/2019    GLUCOSEU >=1000 (1%) (A) 09/27/2019    KETONESU Negative 09/27/2019    BILIRUBINUR Negative 09/27/2019    BLOODU Moderate (A) 09/27/2019   ,   Urine Culture: No results found for: URINECX,   Wound Culure:   Lab Results   Component Value Date    WOUNDCULT 1+ Growth of Morganella morganii (A) 08/30/2020    WOUNDCULT 2+ Growth of Staphylococcus aureus (A) 08/30/2020    WOUNDCULT 1+ Growth of Gram Negative Fransico (A) 08/30/2020       Medications:  Current Facility-Administered Medications   Medication Dose Route Frequency    acetaminophen (TYLENOL) tablet 650 mg  650 mg Oral Q6H PRN    aluminum-magnesium hydroxide-simethicone (MYLANTA) 200-200-20 mg/5 mL oral suspension 30 mL  30 mL Oral Q6H PRN    aspirin chewable tablet 81 mg  81 mg Oral Daily    atorvastatin (LIPITOR) tablet 40 mg  40 mg Oral Daily With Dinner    clopidogrel (PLAVIX) tablet 75 mg  75 mg Oral Daily    enoxaparin (LOVENOX) subcutaneous injection 40 mg  40 mg Subcutaneous Q24H [address]    insulin glargine (LANTUS) subcutaneous injection 35 Units 0 35 mL  35 Units Subcutaneous HS    insulin lispro (HumaLOG) 100 units/mL subcutaneous injection 1-5 Units  1-5 Units Subcutaneous 4x Daily (AC & HS)    insulin lispro (HumaLOG) 100 units/mL subcutaneous injection 15 Units  15 Units Subcutaneous TID With Meals    LORazepam (ATIVAN) tablet 0 5 mg  0 5 mg Oral BID PRN    methimazole (TAPAZOLE) tablet 5 mg  5 mg Oral Daily    ondansetron (ZOFRAN) injection 4 mg  4 mg Intravenous Q6H PRN    pantoprazole (PROTONIX) EC tablet 20 mg  20 mg Oral Early Morning    polyethylene glycol (MIRALAX) packet 17 g  17 g Oral Daily PRN    sodium chloride 0 9 % infusion  100 mL/hr Intravenous Continuous    vancomycin (VANCOCIN) 2,000 mg in sodium chloride 0 9 % 500 mL IVPB  2,000 mg Intravenous Q12H    verapamil (CALAN-SR) CR tablet 240 mg  240 mg Oral Daily    zolpidem (AMBIEN) tablet 10 mg  10 mg Oral HS PRN       Imaging:  LEAD reviewed as noted above     Physical Exam:    General appearance: alert and oriented, in no acute distress  Skin: Skin color, texture, turgor normal  No rashes or lesions  Neurologic: Grossly normal  Head: Normocephalic, without obvious abnormality, atraumatic  Eyes: EOMI  Throat: lips, mucosa, and tongue normal; teeth and gums normal  Neck: no JVD  Lungs: breathing comfortabley no acute distress  Chest wall: no tenderness  Extremities: extremities normal, warm and well-perfused; no cyanosis, clubbing, or edema    Wound/Incision:  R foot  dressing clean, dry, and intact        NAILA Frias  9/1/2020  [facility]  [phone]

## 2020-09-01 NOTE — PLAN OF CARE
Problem: Potential for Falls  Goal: Patient will remain free of falls  Description: INTERVENTIONS:  - Assess patient frequently for physical needs  -  Identify cognitive and physical deficits and behaviors that affect risk of falls    -  [place] fall precautions as indicated by assessment   - Educate patient/family on patient safety including physical limitations  - Instruct patient to call for assistance with activity based on assessment  - Modify environment to reduce risk of injury  - Consider OT/PT consult to assist with strengthening/mobility  Outcome: Not Progressing     Problem: PAIN - ADULT  Goal: Verbalizes/displays adequate comfort level or baseline comfort level  Description: Interventions:  - Encourage patient to monitor pain and request assistance  - Assess pain using appropriate pain scale  - Administer analgesics based on type and severity of pain and evaluate response  - Implement non-pharmacological measures as appropriate and evaluate response  - Consider cultural and social influences on pain and pain management  - Notify physician/advanced practitioner if interventions unsuccessful or patient reports new pain  Outcome: Not Progressing     Problem: INFECTION - ADULT  Goal: Absence or prevention of progression during hospitalization  Description: INTERVENTIONS:  - Assess and monitor for signs and symptoms of infection  - Monitor lab/diagnostic results  - Monitor all insertion sites, i e  indwelling lines, tubes, and drains  - Monitor endotracheal if appropriate and nasal secretions for changes in amount and color  - [place] appropriate cooling/warming therapies per order  - Administer medications as ordered  - Instruct and encourage patient and family to use good hand hygiene technique  - Identify and instruct in appropriate isolation precautions for identified infection/condition  Outcome: Not Progressing  Goal: Absence of fever/infection during neutropenic period  Description: INTERVENTIONS:  - Monitor WBC    Outcome: Not Progressing     Problem: SAFETY ADULT  Goal: Patient will remain free of falls  Description: INTERVENTIONS:  - Assess patient frequently for physical needs  -  Identify cognitive and physical deficits and behaviors that affect risk of falls    -  [place] fall precautions as indicated by assessment   - Educate patient/family on patient safety including physical limitations  - Instruct patient to call for assistance with activity based on assessment  - Modify environment to reduce risk of injury  - Consider OT/PT consult to assist with strengthening/mobility  Outcome: Not Progressing  Goal: Maintain or return to baseline ADL function  Description: INTERVENTIONS:  -  Assess patient's ability to carry out ADLs; assess patient's baseline for ADL function and identify physical deficits which impact ability to perform ADLs (bathing, care of mouth/teeth, toileting, grooming, dressing, etc )  - Assess/evaluate cause of self-care deficits   - Assess range of motion  - Assess patient's mobility; develop plan if impaired  - Assess patient's need for assistive devices and provide as appropriate  - Encourage maximum independence but intervene and supervise when necessary  - Involve family in performance of ADLs  - Assess for home care needs following discharge   - Consider OT consult to assist with ADL evaluation and planning for discharge  - Provide patient education as appropriate  Outcome: Not Progressing  Goal: Maintain or return mobility status to optimal level  Description: INTERVENTIONS:  - Assess patient's baseline mobility status (ambulation, transfers, stairs, etc )    - Identify cognitive and physical deficits and behaviors that affect mobility  - Identify mobility aids required to assist with transfers and/or ambulation (gait belt, sit-to-stand, lift, walker, cane, etc )  - [place] fall precautions as indicated by assessment  - Record patient progress and toleration of activity level on Mobility SBAR; progress patient to next Phase/Stage  - Instruct patient to call for assistance with activity based on assessment  - Consider rehabilitation consult to assist with strengthening/weightbearing, etc   Outcome: Not Progressing     Problem: DISCHARGE PLANNING  Goal: Discharge to home or other facility with appropriate resources  Description: INTERVENTIONS:  - Identify barriers to discharge w/patient and caregiver  - Arrange for needed discharge resources and transportation as appropriate  - Identify discharge learning needs (meds, wound care, etc )  - Arrange for interpretive services to assist at discharge as needed  - Refer to Case Management Department for coordinating discharge planning if the patient needs post-hospital services based on physician/advanced practitioner order or complex needs related to functional status, cognitive ability, or social support system  Outcome: Not Progressing     Problem: Knowledge Deficit  Goal: Patient/family/caregiver demonstrates understanding of disease process, treatment plan, medications, and discharge instructions  Description: Complete learning assessment and assess knowledge base  Interventions:  - Provide teaching at level of understanding  - Provide teaching via preferred learning methods  Outcome: Not Progressing     Problem: Nutrition/Hydration-ADULT  Goal: Nutrient/Hydration intake appropriate for improving, restoring or maintaining nutritional needs  Description: Monitor and assess patient's nutrition/hydration status for malnutrition  Collaborate with interdisciplinary team and initiate plan and interventions as ordered  Monitor patient's weight and dietary intake as ordered or per policy  Utilize nutrition screening tool and intervene as necessary  Determine patient's food preferences and provide high-protein, high-caloric foods as appropriate       INTERVENTIONS:  - Monitor oral intake, urinary output, labs, and treatment plans  - Assess nutrition and hydration status and recommend course of action  - Evaluate amount of meals eaten  - Assist patient with eating if necessary   - Allow adequate time for meals  - Recommend/ encourage appropriate diets, oral nutritional supplements, and vitamin/mineral supplements  - Order, calculate, and assess calorie counts as needed  - Recommend, monitor, and adjust tube feedings and TPN/PPN based on assessed needs  - Assess need for intravenous fluids  - Provide specific nutrition/hydration education as appropriate  - Include patient/family/caregiver in decisions related to nutrition  Outcome: Not Progressing

## 2020-09-02 ENCOUNTER — ANESTHESIA EVENT (INPATIENT)
Dept: PERIOP | Facility: HOSPITAL | Age: 55
DRG: 474 | End: 2020-09-02
Payer: COMMERCIAL

## 2020-09-02 LAB
GLUCOSE SERPL-MCNC: 100 MG/DL (ref 65–140)
GLUCOSE SERPL-MCNC: 100 MG/DL (ref 65–140)
GLUCOSE SERPL-MCNC: 111 MG/DL (ref 65–140)
GLUCOSE SERPL-MCNC: 138 MG/DL (ref 65–140)
VANCOMYCIN TROUGH SERPL-MCNC: 17.3 UG/ML (ref 10–20)

## 2020-09-02 PROCEDURE — 99233 SBSQ HOSP IP/OBS HIGH 50: CPT | Performed by: INTERNAL MEDICINE

## 2020-09-02 PROCEDURE — 99232 SBSQ HOSP IP/OBS MODERATE 35: CPT | Performed by: INTERNAL MEDICINE

## 2020-09-02 PROCEDURE — 80202 ASSAY OF VANCOMYCIN: CPT | Performed by: STUDENT IN AN ORGANIZED HEALTH CARE EDUCATION/TRAINING PROGRAM

## 2020-09-02 PROCEDURE — 82948 REAGENT STRIP/BLOOD GLUCOSE: CPT

## 2020-09-02 RX ORDER — INSULIN GLARGINE 100 [IU]/ML
20 INJECTION, SOLUTION SUBCUTANEOUS
Status: DISCONTINUED | OUTPATIENT
Start: 2020-09-02 | End: 2020-09-07

## 2020-09-02 RX ADMIN — VANCOMYCIN HYDROCHLORIDE 2000 MG: 1 INJECTION, POWDER, LYOPHILIZED, FOR SOLUTION INTRAVENOUS at 07:40

## 2020-09-02 RX ADMIN — VERAPAMIL HYDROCHLORIDE 240 MG: 120 TABLET, FILM COATED, EXTENDED RELEASE ORAL at 09:33

## 2020-09-02 RX ADMIN — ASPIRIN 81 MG 81 MG: 81 TABLET ORAL at 09:33

## 2020-09-02 RX ADMIN — INSULIN LISPRO 15 UNITS: 100 INJECTION, SOLUTION INTRAVENOUS; SUBCUTANEOUS at 17:01

## 2020-09-02 RX ADMIN — ENOXAPARIN SODIUM 40 MG: 40 INJECTION SUBCUTANEOUS at 09:33

## 2020-09-02 RX ADMIN — ATORVASTATIN CALCIUM 40 MG: 40 TABLET, FILM COATED ORAL at 17:01

## 2020-09-02 RX ADMIN — VANCOMYCIN HYDROCHLORIDE 2000 MG: 1 INJECTION, POWDER, LYOPHILIZED, FOR SOLUTION INTRAVENOUS at 19:49

## 2020-09-02 RX ADMIN — INSULIN LISPRO 15 UNITS: 100 INJECTION, SOLUTION INTRAVENOUS; SUBCUTANEOUS at 11:46

## 2020-09-02 RX ADMIN — METHIMAZOLE 5 MG: 5 TABLET ORAL at 09:33

## 2020-09-02 RX ADMIN — CLOPIDOGREL BISULFATE 75 MG: 75 TABLET ORAL at 09:33

## 2020-09-02 RX ADMIN — LOSARTAN POTASSIUM: 50 TABLET, FILM COATED ORAL at 13:12

## 2020-09-02 RX ADMIN — INSULIN LISPRO 15 UNITS: 100 INJECTION, SOLUTION INTRAVENOUS; SUBCUTANEOUS at 07:40

## 2020-09-02 NOTE — PROGRESS NOTES
Vancomycin IV Pharmacy-to-Dose Consultation    Mckinley Stoner is a 60 y o  male who is currently receiving Vancomycin IV with management by the Pharmacy Consult service  Assessment/Plan:  The patient was reviewed  Renal function is stable and no signs or symptoms of nephrotoxicity and/or infusion reactions were documented in the chart  The patient's vancomycin trough was therapeutic at 17 3  Based on todays assessment, continue current vancomycin (day # 7) dosing of 2000mg IV q12h, with a plan for trough to be drawn at 0630 on 9/7  We will continue to follow the patients culture results and clinical progress daily      Leo Curling, Pharmacist

## 2020-09-02 NOTE — PROGRESS NOTES
Progress Note - Jose L Gould 1965, 60 y o  male MRN: [id number]    Unit/Bed#: -01 Encounter: [id number]    Primary Care Provider: Ivett Camacho MD   Date and time admitted to hospital: 8/28/2020  3:14 PM        * Osteomyelitis [facility])  Assessment & Plan  Recent history of right hallux amputation  Presented with wound dehiscence, wound cellulitis, possible osteomyelitis  2/2 blood culture from 08/28/2020 growing MRSA  Right foot MRI report reviewed and noted for osteomyelitis  Currently afebrile, hemodynamically stable  Cleared by vascular surgery for amputation  Encourage for strict nonweightbearing to right lower extremity at this time  Continue dressing changes per podiatrist recommendation  Podiatry recommendations appreciated  Patient scheduled for transmetatarsal resection tomorrow 09/03/2020    MRSA bacteremia  Assessment & Plan  2/2 culture from 08/28/2020 growing Staph aureus  Likely source foot wound  Continue IV vancomycin for now  Vascular surgery, Podiatry following  ID recommendations appreciated  Pharmacy consult for vancomycin management  Amputation of right great toe [facility])  Assessment & Plan  Now presented again with poor wound healing, wound dehiscence, possible cellulitis, osteomyelitis  Plan is as stated above under 1  And 2  PAD (peripheral artery disease) [facility])  Assessment & Plan  Patient history of recent right popliteal PTA/ stent 08/06/2020 and right hallux amputation  Now noted with osteomyelitis as evident on right foot MRI report  Cleared by vascular surgery for further debridement and/or amputation  Continue aspirin, statin, Plavix  Follow-up with vascular surgery recommendation as well as podiatry following        Type 2 diabetes mellitus with diabetic polyneuropathy, with long-term current use of insulin [facility])  Assessment & Plan  Lab Results   Component Value Date    HGBA1C 8 1 (H) 08/28/2020       Recent Labs     09/01/20 2020 20  0724 20  1142 20  1532   POCGLU 282* 100 100 111       Blood Sugar Average: Last 72 hrs:  · (P) 154 625     Uncontrolled  Hemoglobin A1c 8 1  Blood sugars well controlled with current regimen of Lantus 35 units q h s  And Humalog 15 with meals    Will decrease the dose of Lantus to 20 units tonight in anticipation of surgery tomorrow  Continue sliding scale  Continue to monitor sugars  Continue to monitor for hypoglycemia    Hyperlipidemia  Assessment & Plan  · Continue statin    Gastroesophageal reflux disease without esophagitis  Assessment & Plan  · Continue PPI    Hypertension, essential  Assessment & Plan  · Controlled  · Continue anti-HTN meds          VTE Pharmacologic Prophylaxis:   Pharmacologic: Heparin  Mechanical VTE Prophylaxis in Place: Yes    Patient Centered Rounds: I have performed bedside rounds with nursing staff today  Discussions with Specialists or Other Care Team Provider:  Podiatry    Education and Discussions with Family / Patient:  Patient and his daughter    Time Spent for Care: 30 minutes  More than 50% of total time spent on counseling and coordination of care as described above  Current Length of Stay: 5 day(s)    Current Patient Status: Inpatient   Certification Statement: The patient will continue to require additional inpatient hospital stay due to Surgery    Discharge Plan:  Plan for surgery tomorrow    Code Status: Level 1 - Full Code      Subjective:   Patient seen and examined  No acute complaints at this time  He is anxious about upcoming surgery  No fevers or chills  Pain is well controlled  No diarrhea abdominal pain or nausea vomiting  No acute issues    Objective:     Vitals:   Temp (24hrs), Av 7 °F (36 5 °C), Min:97 3 °F (36 3 °C), Max:98 °F (36 7 °C)    Temp:  [97 3 °F (36 3 °C)-98 °F (36 7 °C)] 98 °F (36 7 °C)  HR:  [65] 65  Resp:  [17-18] 17  BP: (106-152)/(79-83) 135/83  SpO2:  [92 %] 92 %  Body mass index is 30 89 kg/m²  Input and Output Summary (last 24 hours): Intake/Output Summary (Last 24 hours) at 9/2/2020 1712  Last data filed at 9/2/2020 1308  Gross per 24 hour   Intake 1020 ml   Output 1450 ml   Net -430 ml       Physical Exam:     Physical Exam  Constitutional:       Appearance: Normal appearance  HENT:      Head: Normocephalic and atraumatic  Neck:      Musculoskeletal: Normal range of motion and neck supple  Cardiovascular:      Rate and Rhythm: Normal rate and regular rhythm  Pulses: Normal pulses  Heart sounds: Normal heart sounds  Pulmonary:      Effort: Pulmonary effort is normal       Breath sounds: Normal breath sounds  Abdominal:      General: Abdomen is flat  Palpations: Abdomen is soft  Musculoskeletal:      Comments: Foot wrapped surgical bandage   Skin:     General: Skin is warm and dry  Neurological:      General: No focal deficit present  Mental Status: He is alert and oriented to person, place, and time  Additional Data:     Labs:    Results from last 7 days   Lab Units 09/01/20  0657   WBC Thousand/uL 9 07   HEMOGLOBIN g/dL 11 1*   HEMATOCRIT % 33 3*   PLATELETS Thousands/uL 403*   NEUTROS PCT % 55   LYMPHS PCT % 26   MONOS PCT % 12   EOS PCT % 5     Results from last 7 days   Lab Units 09/01/20  0657  08/28/20  1558   SODIUM mmol/L 136   < > 133*   POTASSIUM mmol/L 3 5   < > 4 2   CHLORIDE mmol/L 102   < > 96*   CO2 mmol/L 25   < > 26   BUN mg/dL 10   < > 19   CREATININE mg/dL 0 82   < > 0 94   ANION GAP mmol/L 9   < > 11   CALCIUM mg/dL 9 2   < > 9 1   ALBUMIN g/dL  --   --  3 0*   TOTAL BILIRUBIN mg/dL  --   --  0 50   ALK PHOS U/L  --   --  97   ALT U/L  --   --  44   AST U/L  --   --  50*   GLUCOSE RANDOM mg/dL 119   < > 182*    < > = values in this interval not displayed           Results from last 7 days   Lab Units 09/02/20  1532 09/02/20  1142 09/02/20  0724 09/01/20 2020 09/01/20  1653 09/01/20  1311 09/01/20  0705 08/31/20  2108 08/31/20  1612 08/31/20  1128 08/31/20  0615 08/30/20  2048   POC GLUCOSE mg/dl 111 100 100 282* 147* 127 119 211* 243* 195* 105 247*     Results from last 7 days   Lab Units 08/28/20  1558   HEMOGLOBIN A1C % 8 1*     Results from last 7 days   Lab Units 08/28/20  1738 08/28/20  1558   LACTIC ACID mmol/L 1 3 2 2*           * I Have Reviewed All Lab Data Listed Above  * Additional Pertinent Lab Tests Reviewed: Pradeep 66 Admission Reviewed    Imaging:    Imaging Reports Reviewed Today Include:   Imaging Personally Reviewed by Myself Includes:      Recent Cultures (last 7 days):     Results from last 7 days   Lab Units 09/01/20  0703 09/01/20  0657 08/31/20  0615 08/30/20  1646 08/28/20  1558   BLOOD CULTURE  Received in Microbiology Lab  Culture in Progress  Received in Microbiology Lab  Culture in Progress  No Growth at 48 hrs    --  Methicillin Resistant Staphylococcus aureus*  Methicillin Resistant Staphylococcus aureus*   GRAM STAIN RESULT   --   --   --  Rare Polys*  2+ Gram negative rods*  2+ Gram positive cocci in pairs* Gram positive cocci in clusters*  Gram positive cocci in clusters*   WOUND CULTURE   --   --   --  2+ Growth of Morganella morganii*  2+ Growth of Methicillin Resistant Staphylococcus aureus*  1+ Growth of Enterobacter cloacae complex*  1+ Growth of Enterococcus faecalis*  --        Last 24 Hours Medication List:   Current Facility-Administered Medications   Medication Dose Route Frequency Provider Last Rate    acetaminophen  650 mg Oral Q6H PRN Helen Mendoza PA-C      aluminum-magnesium hydroxide-simethicone  30 mL Oral Q6H PRN Helen Mendoza PA-C      aspirin  81 mg Oral Daily Helen Mendoza PA-C      atorvastatin  40 mg Oral Daily With [employer] TechnologiesGABRIEL      clopidogrel  75 mg Oral Daily Helen Mendoza PA-C      insulin glargine  20 Units Subcutaneous HS Abby Escoto MD      insulin lispro  1-5 Units Subcutaneous 4x Daily (AC & HS) Octaviano Monaco PA-C  insulin lispro  15 Units Subcutaneous TID With Meals Serafin YAN MD      LORazepam  0 5 mg Oral BID PRN Bela Pearce PA-C      losartan potassium-hydrochlorothiazide (HYZAAR 100/12  5) combo dose   Oral Daily Avinash Cisneros MD      methimazole  5 mg Oral Daily Helen Mendoza PA-C      ondansetron  4 mg Intravenous Q6H PRN Helen Mendoaz PA-C      pantoprazole  20 mg Oral Early Morning Helen Mendoza PA-C      polyethylene glycol  17 g Oral Daily PRN Bela Pearce PA-C      sodium chloride  100 mL/hr Intravenous Continuous Med Moreno YAN  mL/hr (09/01/20 6664)    vancomycin  2,000 mg Intravenous Q12H Serafin YAN MD 2,000 mg (09/02/20 0740)    verapamil  240 mg Oral Daily Helen Mendoza PA-C      zolpidem  10 mg Oral HS PRN Bela Pearce PA-C          Today, Patient Was Seen By: Avinash Cisneros MD    ** Please Note: Dictation voice to text software may have been used in the creation of this document   **

## 2020-09-02 NOTE — PLAN OF CARE
Problem: Potential for Falls  Goal: Patient will remain free of falls  Description: INTERVENTIONS:  - Assess patient frequently for physical needs  -  Identify cognitive and physical deficits and behaviors that affect risk of falls    -  [place] fall precautions as indicated by assessment   - Educate patient/family on patient safety including physical limitations  - Instruct patient to call for assistance with activity based on assessment  - Modify environment to reduce risk of injury  - Consider OT/PT consult to assist with strengthening/mobility  Outcome: Progressing

## 2020-09-02 NOTE — PROGRESS NOTES
Patient to the OR tomorrow for a right foot transmetatarsal amputation  Patient is NPO at midnight  Patient is agreeable for surgery  Vascular has approved the surgery  Hold blood thinners prior to surgery  Nonweightbearing to the right foot  Rehab after surgery

## 2020-09-02 NOTE — ASSESSMENT & PLAN NOTE
Lab Results   Component Value Date    HGBA1C 8 1 (H) 08/28/2020       Recent Labs     09/01/20 2020 09/02/20  0724 09/02/20  1142 09/02/20  1532   POCGLU 282* 100 100 111       Blood Sugar Average: Last 72 hrs:  · (P) 154 625     Uncontrolled  Hemoglobin A1c 8 1  Blood sugars well controlled with current regimen of Lantus 35 units q h s   And Humalog 15 with meals    Will decrease the dose of Lantus to 20 units tonight in anticipation of surgery tomorrow  Continue sliding scale  Continue to monitor sugars  Continue to monitor for hypoglycemia

## 2020-09-02 NOTE — ASSESSMENT & PLAN NOTE
Recent history of right hallux amputation  Presented with wound dehiscence, wound cellulitis, possible osteomyelitis  2/2 blood culture from 08/28/2020 growing MRSA  Right foot MRI report reviewed and noted for osteomyelitis  Currently afebrile, hemodynamically stable  Cleared by vascular surgery for amputation  Encourage for strict nonweightbearing to right lower extremity at this time  Continue dressing changes per podiatrist recommendation      Podiatry recommendations appreciated  Patient scheduled for transmetatarsal resection tomorrow 09/03/2020

## 2020-09-02 NOTE — PROGRESS NOTES
Progress Note - Infectious Disease   Selma Carter 60 y o  male MRN: [id number]  Unit/Bed#: -01 Encounter: [id number]      A/P:  1  MRSA bacteremia  Secondary to #2/3   Negative 2D echo  No intravascular prosthetic devices  Repeat blood culture is negative thus far      -continue IV vancomycin with dosing recommendations per pharmacy  -follow-up repeat blood cultures  -likely plan for 4 week course of IV antibiotic, if repeat blood cultures are negative   -hold off on PICC line placement ill blood cultures are negative at 72 hours     2  Right foot cellulitis  Suspect secondary to MRSA  Wound culture also shows Morganella, Enterobacter, Enterococcus, which is not unexpected finding in the setting of chronic ulceration  Would continue targeted therapy for MRSA infection      -antibiotic plan as above  -serial foot exams     3  Right foot nonhealing ulceration   With residual 1st and 2nd toe osteomyelitis  Podiatry input noted with plan for surgical intervention tomorrow      -antibiotic plan as above  -daily local wound care and dressing change  -followup operative findings   -close podiatry follow-up ongoing     4  Status post 1st digit amputation on    Complicated by above      5  PAD   Status post recent revascularization  Vascular surgery input noted      6  Uncontrolled diabetes mellitus  Risk factor for severe infection  Subjective:  Feels okay  Awaiting surgery tomorrow  Denies fevers, chills, or sweats  Denies nausea, vomiting, or diarrhea        Objective:  Vitals:  Temp:  [97 3 °F (36 3 °C)-97 9 °F (36 6 °C)] 97 3 °F (36 3 °C)  HR:  [65] 65  Resp:  [17-18] 17  BP: (106-152)/(79-80) 106/79  SpO2:  [92 %] 92 %  Temp (24hrs), Av 6 °F (36 4 °C), Min:97 3 °F (36 3 °C), Max:97 9 °F (36 6 °C)  Current: Temperature: (!) 97 3 °F (36 3 °C)    Physical Exam:   General:  No acute distress, nontoxic, resting comfortably in bed  Psychiatric:  Awake and alert  Pulmonary: Normal respiratory excursion without accessory muscle use  Abdomen:  Soft, nontender, obese  Extremities:  No edema  Foot dressing intact with no ascending erythema  Skin:  No rashes    Lab Results:  I have personally reviewed pertinent labs  Results from last 7 days   Lab Units 09/01/20  0657 08/31/20  0615 08/30/20  0407  08/28/20  1558   POTASSIUM mmol/L 3 5 3 9 4 5   < > 4 2   CHLORIDE mmol/L 102 101 105   < > 96*   CO2 mmol/L 25 27 26   < > 26   BUN mg/dL 10 9 11   < > 19   CREATININE mg/dL 0 82 0 82 0 89   < > 0 94   EGFR ml/min/1 73sq m 98 98 95   < > 90   CALCIUM mg/dL 9 2 9 2 8 3   < > 9 1   AST U/L  --   --   --   --  50*   ALT U/L  --   --   --   --  44   ALK PHOS U/L  --   --   --   --  97    < > = values in this interval not displayed  Results from last 7 days   Lab Units 09/01/20  0657 08/31/20  0615 08/30/20  0407   WBC Thousand/uL 9 07 7 89 7 81   HEMOGLOBIN g/dL 11 1* 12 5 10 8*   PLATELETS Thousands/uL 403* 400* 327     Results from last 7 days   Lab Units 09/01/20  0703 09/01/20  0657 08/31/20  0615 08/30/20  1646 08/28/20  1558   BLOOD CULTURE  Received in Microbiology Lab  Culture in Progress  Received in Microbiology Lab  Culture in Progress  No Growth at 48 hrs  --  Methicillin Resistant Staphylococcus aureus*  Methicillin Resistant Staphylococcus aureus*   GRAM STAIN RESULT   --   --   --  Rare Polys*  2+ Gram negative rods*  2+ Gram positive cocci in pairs* Gram positive cocci in clusters*  Gram positive cocci in clusters*   WOUND CULTURE   --   --   --  2+ Growth of Morganella morganii*  2+ Growth of Methicillin Resistant Staphylococcus aureus*  1+ Growth of Enterobacter cloacae complex*  1+ Growth of Enterococcus faecalis*  --        Imaging Studies:   I have personally reviewed pertinent imaging study reports and images in PACS  EKG, Pathology, and Other Studies:   I have personally reviewed pertinent reports

## 2020-09-02 NOTE — PLAN OF CARE
Problem: Potential for Falls  Goal: Patient will remain free of falls  Description: INTERVENTIONS:  - Assess patient frequently for physical needs  -  Identify cognitive and physical deficits and behaviors that affect risk of falls    -  [place] fall precautions as indicated by assessment   - Educate patient/family on patient safety including physical limitations  - Instruct patient to call for assistance with activity based on assessment  - Modify environment to reduce risk of injury  - Consider OT/PT consult to assist with strengthening/mobility  Outcome: Progressing     Problem: PAIN - ADULT  Goal: Verbalizes/displays adequate comfort level or baseline comfort level  Description: Interventions:  - Encourage patient to monitor pain and request assistance  - Assess pain using appropriate pain scale  - Administer analgesics based on type and severity of pain and evaluate response  - Implement non-pharmacological measures as appropriate and evaluate response  - Consider cultural and social influences on pain and pain management  - Notify physician/advanced practitioner if interventions unsuccessful or patient reports new pain  Outcome: Progressing     Problem: INFECTION - ADULT  Goal: Absence or prevention of progression during hospitalization  Description: INTERVENTIONS:  - Assess and monitor for signs and symptoms of infection  - Monitor lab/diagnostic results  - Monitor all insertion sites, i e  indwelling lines, tubes, and drains  - Monitor endotracheal if appropriate and nasal secretions for changes in amount and color  - [place] appropriate cooling/warming therapies per order  - Administer medications as ordered  - Instruct and encourage patient and family to use good hand hygiene technique  - Identify and instruct in appropriate isolation precautions for identified infection/condition  Outcome: Progressing  Goal: Absence of fever/infection during neutropenic period  Description: INTERVENTIONS:  - Monitor WBC    Outcome: Progressing     Problem: SAFETY ADULT  Goal: Patient will remain free of falls  Description: INTERVENTIONS:  - Assess patient frequently for physical needs  -  Identify cognitive and physical deficits and behaviors that affect risk of falls  -  [place] fall precautions as indicated by assessment   - Educate patient/family on patient safety including physical limitations  - Instruct patient to call for assistance with activity based on assessment  - Modify environment to reduce risk of injury  - Consider OT/PT consult to assist with strengthening/mobility  Outcome: Progressing  Goal: Maintain or return to baseline ADL function  Description: INTERVENTIONS:  - Assess patient frequently for physical needs  -  Identify cognitive and physical deficits and behaviors that affect risk of falls    -  [place] fall precautions as indicated by assessment   - Educate patient/family on patient safety including physical limitations  - Instruct patient to call for assistance with activity based on assessment  - Modify environment to reduce risk of injury  - Consider OT/PT consult to assist with strengthening/mobility  Outcome: Progressing  Goal: Maintain or return mobility status to optimal level  Description: INTERVENTIONS:  -  Assess patient's ability to carry out ADLs; assess patient's baseline for ADL function and identify physical deficits which impact ability to perform ADLs (bathing, care of mouth/teeth, toileting, grooming, dressing, etc )  - Assess/evaluate cause of self-care deficits   - Assess range of motion  - Assess patient's mobility; develop plan if impaired  - Assess patient's need for assistive devices and provide as appropriate  - Encourage maximum independence but intervene and supervise when necessary  - Involve family in performance of ADLs  - Assess for home care needs following discharge   - Consider OT consult to assist with ADL evaluation and planning for discharge  - Provide patient education as appropriate  Outcome: Progressing     Problem: DISCHARGE PLANNING  Goal: Discharge to home or other facility with appropriate resources  Description: INTERVENTIONS:  - Identify barriers to discharge w/patient and caregiver  - Arrange for needed discharge resources and transportation as appropriate  - Identify discharge learning needs (meds, wound care, etc )  - Arrange for interpretive services to assist at discharge as needed  - Refer to Case Management Department for coordinating discharge planning if the patient needs post-hospital services based on physician/advanced practitioner order or complex needs related to functional status, cognitive ability, or social support system  Outcome: Progressing     Problem: Knowledge Deficit  Goal: Patient/family/caregiver demonstrates understanding of disease process, treatment plan, medications, and discharge instructions  Description: Complete learning assessment and assess knowledge base  Interventions:  - Provide teaching at level of understanding  - Provide teaching via preferred learning methods  Outcome: Progressing     Problem: Nutrition/Hydration-ADULT  Goal: Nutrient/Hydration intake appropriate for improving, restoring or maintaining nutritional needs  Description: Monitor and assess patient's nutrition/hydration status for malnutrition  Collaborate with interdisciplinary team and initiate plan and interventions as ordered  Monitor patient's weight and dietary intake as ordered or per policy  Utilize nutrition screening tool and intervene as necessary  Determine patient's food preferences and provide high-protein, high-caloric foods as appropriate       INTERVENTIONS:  - Monitor oral intake, urinary output, labs, and treatment plans  - Assess nutrition and hydration status and recommend course of action  - Evaluate amount of meals eaten  - Assist patient with eating if necessary   - Allow adequate time for meals  - Recommend/ encourage appropriate diets, oral nutritional supplements, and vitamin/mineral supplements  - Order, calculate, and assess calorie counts as needed  - Recommend, monitor, and adjust tube feedings and TPN/PPN based on assessed needs  - Assess need for intravenous fluids  - Provide specific nutrition/hydration education as appropriate  - Include patient/family/caregiver in decisions related to nutrition  Outcome: Progressing

## 2020-09-03 ENCOUNTER — ANESTHESIA (INPATIENT)
Dept: PERIOP | Facility: HOSPITAL | Age: 55
DRG: 474 | End: 2020-09-03
Payer: COMMERCIAL

## 2020-09-03 ENCOUNTER — APPOINTMENT (INPATIENT)
Dept: RADIOLOGY | Facility: HOSPITAL | Age: 55
DRG: 474 | End: 2020-09-03
Payer: COMMERCIAL

## 2020-09-03 VITALS — HEART RATE: 58 BPM

## 2020-09-03 LAB
BACTERIA WND AEROBE CULT: ABNORMAL
GLUCOSE SERPL-MCNC: 108 MG/DL (ref 65–140)
GLUCOSE SERPL-MCNC: 112 MG/DL (ref 65–140)
GLUCOSE SERPL-MCNC: 121 MG/DL (ref 65–140)
GLUCOSE SERPL-MCNC: 126 MG/DL (ref 65–140)
GLUCOSE SERPL-MCNC: 164 MG/DL (ref 65–140)
GLUCOSE SERPL-MCNC: 95 MG/DL (ref 65–140)
GRAM STN SPEC: ABNORMAL

## 2020-09-03 PROCEDURE — 0Y6M0Z9 DETACHMENT AT RIGHT FOOT, PARTIAL 1ST RAY, OPEN APPROACH: ICD-10-PCS | Performed by: PODIATRIST

## 2020-09-03 PROCEDURE — 0Y6M0ZB DETACHMENT AT RIGHT FOOT, PARTIAL 2ND RAY, OPEN APPROACH: ICD-10-PCS | Performed by: PODIATRIST

## 2020-09-03 PROCEDURE — 99232 SBSQ HOSP IP/OBS MODERATE 35: CPT | Performed by: INTERNAL MEDICINE

## 2020-09-03 PROCEDURE — 88305 TISSUE EXAM BY PATHOLOGIST: CPT | Performed by: PATHOLOGY

## 2020-09-03 PROCEDURE — 88311 DECALCIFY TISSUE: CPT | Performed by: PATHOLOGY

## 2020-09-03 PROCEDURE — 73630 X-RAY EXAM OF FOOT: CPT

## 2020-09-03 PROCEDURE — 0Y6M0ZC DETACHMENT AT RIGHT FOOT, PARTIAL 3RD RAY, OPEN APPROACH: ICD-10-PCS | Performed by: PODIATRIST

## 2020-09-03 PROCEDURE — 82948 REAGENT STRIP/BLOOD GLUCOSE: CPT

## 2020-09-03 PROCEDURE — 0Y6M0ZD DETACHMENT AT RIGHT FOOT, PARTIAL 4TH RAY, OPEN APPROACH: ICD-10-PCS | Performed by: PODIATRIST

## 2020-09-03 PROCEDURE — 0Y6M0ZF DETACHMENT AT RIGHT FOOT, PARTIAL 5TH RAY, OPEN APPROACH: ICD-10-PCS | Performed by: PODIATRIST

## 2020-09-03 PROCEDURE — 99233 SBSQ HOSP IP/OBS HIGH 50: CPT | Performed by: INTERNAL MEDICINE

## 2020-09-03 RX ORDER — PROPOFOL 10 MG/ML
INJECTION, EMULSION INTRAVENOUS CONTINUOUS PRN
Status: DISCONTINUED | OUTPATIENT
Start: 2020-09-03 | End: 2020-09-03

## 2020-09-03 RX ORDER — PROPOFOL 10 MG/ML
INJECTION, EMULSION INTRAVENOUS AS NEEDED
Status: DISCONTINUED | OUTPATIENT
Start: 2020-09-03 | End: 2020-09-03

## 2020-09-03 RX ORDER — PROMETHAZINE HYDROCHLORIDE 25 MG/ML
25 INJECTION, SOLUTION INTRAMUSCULAR; INTRAVENOUS ONCE AS NEEDED
Status: DISCONTINUED | OUTPATIENT
Start: 2020-09-03 | End: 2020-09-03 | Stop reason: HOSPADM

## 2020-09-03 RX ORDER — EPHEDRINE SULFATE 50 MG/ML
INJECTION INTRAVENOUS AS NEEDED
Status: DISCONTINUED | OUTPATIENT
Start: 2020-09-03 | End: 2020-09-03

## 2020-09-03 RX ORDER — OXYCODONE HYDROCHLORIDE 10 MG/1
10 TABLET ORAL EVERY 6 HOURS PRN
Status: DISCONTINUED | OUTPATIENT
Start: 2020-09-03 | End: 2020-09-21 | Stop reason: HOSPADM

## 2020-09-03 RX ORDER — BUPIVACAINE HYDROCHLORIDE 5 MG/ML
INJECTION, SOLUTION PERINEURAL AS NEEDED
Status: DISCONTINUED | OUTPATIENT
Start: 2020-09-03 | End: 2020-09-03 | Stop reason: HOSPADM

## 2020-09-03 RX ORDER — OXYCODONE HYDROCHLORIDE 5 MG/1
5 TABLET ORAL EVERY 6 HOURS PRN
Status: DISCONTINUED | OUTPATIENT
Start: 2020-09-03 | End: 2020-09-21 | Stop reason: HOSPADM

## 2020-09-03 RX ORDER — METOCLOPRAMIDE HYDROCHLORIDE 5 MG/ML
10 INJECTION INTRAMUSCULAR; INTRAVENOUS ONCE AS NEEDED
Status: DISCONTINUED | OUTPATIENT
Start: 2020-09-03 | End: 2020-09-03 | Stop reason: HOSPADM

## 2020-09-03 RX ORDER — MIDAZOLAM HYDROCHLORIDE 2 MG/2ML
INJECTION, SOLUTION INTRAMUSCULAR; INTRAVENOUS AS NEEDED
Status: DISCONTINUED | OUTPATIENT
Start: 2020-09-03 | End: 2020-09-03

## 2020-09-03 RX ORDER — FENTANYL CITRATE/PF 50 MCG/ML
50 SYRINGE (ML) INJECTION
Status: DISCONTINUED | OUTPATIENT
Start: 2020-09-03 | End: 2020-09-03 | Stop reason: HOSPADM

## 2020-09-03 RX ORDER — ACETAMINOPHEN 325 MG/1
975 TABLET ORAL EVERY 8 HOURS SCHEDULED
Status: DISCONTINUED | OUTPATIENT
Start: 2020-09-03 | End: 2020-09-08

## 2020-09-03 RX ORDER — FENTANYL CITRATE 50 UG/ML
INJECTION, SOLUTION INTRAMUSCULAR; INTRAVENOUS AS NEEDED
Status: DISCONTINUED | OUTPATIENT
Start: 2020-09-03 | End: 2020-09-03

## 2020-09-03 RX ORDER — DIPHENHYDRAMINE HYDROCHLORIDE 50 MG/ML
12.5 INJECTION INTRAMUSCULAR; INTRAVENOUS ONCE AS NEEDED
Status: DISCONTINUED | OUTPATIENT
Start: 2020-09-03 | End: 2020-09-03 | Stop reason: HOSPADM

## 2020-09-03 RX ORDER — MEPERIDINE HYDROCHLORIDE 50 MG/ML
12.5 INJECTION INTRAMUSCULAR; INTRAVENOUS; SUBCUTANEOUS ONCE AS NEEDED
Status: DISCONTINUED | OUTPATIENT
Start: 2020-09-03 | End: 2020-09-03 | Stop reason: HOSPADM

## 2020-09-03 RX ORDER — LIDOCAINE HYDROCHLORIDE 10 MG/ML
INJECTION, SOLUTION EPIDURAL; INFILTRATION; INTRACAUDAL; PERINEURAL AS NEEDED
Status: DISCONTINUED | OUTPATIENT
Start: 2020-09-03 | End: 2020-09-03 | Stop reason: HOSPADM

## 2020-09-03 RX ORDER — MAGNESIUM HYDROXIDE 1200 MG/15ML
LIQUID ORAL AS NEEDED
Status: DISCONTINUED | OUTPATIENT
Start: 2020-09-03 | End: 2020-09-03 | Stop reason: HOSPADM

## 2020-09-03 RX ORDER — SODIUM CHLORIDE, SODIUM LACTATE, POTASSIUM CHLORIDE, CALCIUM CHLORIDE 600; 310; 30; 20 MG/100ML; MG/100ML; MG/100ML; MG/100ML
INJECTION, SOLUTION INTRAVENOUS CONTINUOUS PRN
Status: DISCONTINUED | OUTPATIENT
Start: 2020-09-03 | End: 2020-09-03

## 2020-09-03 RX ORDER — LIDOCAINE HYDROCHLORIDE 10 MG/ML
INJECTION, SOLUTION EPIDURAL; INFILTRATION; INTRACAUDAL; PERINEURAL AS NEEDED
Status: DISCONTINUED | OUTPATIENT
Start: 2020-09-03 | End: 2020-09-03

## 2020-09-03 RX ORDER — HYDROMORPHONE HCL/PF 1 MG/ML
0.5 SYRINGE (ML) INJECTION
Status: DISCONTINUED | OUTPATIENT
Start: 2020-09-03 | End: 2020-09-03 | Stop reason: HOSPADM

## 2020-09-03 RX ADMIN — ONDANSETRON 4 MG: 2 INJECTION INTRAMUSCULAR; INTRAVENOUS at 18:48

## 2020-09-03 RX ADMIN — EPHEDRINE SULFATE 10 MG: 50 INJECTION INTRAVENOUS at 12:42

## 2020-09-03 RX ADMIN — VANCOMYCIN HYDROCHLORIDE 2000 MG: 1 INJECTION, POWDER, LYOPHILIZED, FOR SOLUTION INTRAVENOUS at 19:25

## 2020-09-03 RX ADMIN — METHIMAZOLE 5 MG: 5 TABLET ORAL at 08:36

## 2020-09-03 RX ADMIN — MIDAZOLAM HYDROCHLORIDE 2 MG: 1 INJECTION, SOLUTION INTRAMUSCULAR; INTRAVENOUS at 12:15

## 2020-09-03 RX ADMIN — VERAPAMIL HYDROCHLORIDE 240 MG: 120 TABLET, FILM COATED, EXTENDED RELEASE ORAL at 08:36

## 2020-09-03 RX ADMIN — OXYCODONE HYDROCHLORIDE 10 MG: 10 TABLET ORAL at 14:32

## 2020-09-03 RX ADMIN — ASPIRIN 81 MG 81 MG: 81 TABLET ORAL at 08:36

## 2020-09-03 RX ADMIN — FENTANYL CITRATE 25 MCG: 50 INJECTION, SOLUTION INTRAMUSCULAR; INTRAVENOUS at 13:13

## 2020-09-03 RX ADMIN — SODIUM CHLORIDE, SODIUM LACTATE, POTASSIUM CHLORIDE, AND CALCIUM CHLORIDE: .6; .31; .03; .02 INJECTION, SOLUTION INTRAVENOUS at 12:09

## 2020-09-03 RX ADMIN — FENTANYL CITRATE 50 MCG: 50 INJECTION, SOLUTION INTRAMUSCULAR; INTRAVENOUS at 12:15

## 2020-09-03 RX ADMIN — PANTOPRAZOLE SODIUM 20 MG: 20 TABLET, DELAYED RELEASE ORAL at 06:15

## 2020-09-03 RX ADMIN — LOSARTAN POTASSIUM: 50 TABLET, FILM COATED ORAL at 08:36

## 2020-09-03 RX ADMIN — CLOPIDOGREL BISULFATE 75 MG: 75 TABLET ORAL at 08:36

## 2020-09-03 RX ADMIN — LIDOCAINE HYDROCHLORIDE 50 MG: 10 INJECTION, SOLUTION EPIDURAL; INFILTRATION; INTRACAUDAL; PERINEURAL at 12:25

## 2020-09-03 RX ADMIN — VANCOMYCIN HYDROCHLORIDE 2000 MG: 1 INJECTION, POWDER, LYOPHILIZED, FOR SOLUTION INTRAVENOUS at 07:35

## 2020-09-03 RX ADMIN — FENTANYL CITRATE 25 MCG: 50 INJECTION, SOLUTION INTRAMUSCULAR; INTRAVENOUS at 13:22

## 2020-09-03 RX ADMIN — PROPOFOL 80 MG: 10 INJECTION, EMULSION INTRAVENOUS at 12:25

## 2020-09-03 RX ADMIN — PHENYLEPHRINE HYDROCHLORIDE 100 MCG: 10 INJECTION INTRAVENOUS at 13:20

## 2020-09-03 RX ADMIN — PROPOFOL 80 MCG/KG/MIN: 10 INJECTION, EMULSION INTRAVENOUS at 12:25

## 2020-09-03 NOTE — PROGRESS NOTES
Progress Note - Tino Coma 1965, 60 y o  male MRN: [id number]    Unit/Bed#: OR POOL Encounter: [id number]    Primary Care Provider: Charisse Beatty MD   Date and time admitted to hospital: 8/28/2020  3:14 PM        * Osteomyelitis [facility])  Assessment & Plan  Recent history of right hallux amputation  Presented with wound dehiscence, wound cellulitis, possible osteomyelitis  2/2 blood culture from 08/28/2020 growing MRSA  Right foot MRI report reviewed and noted for osteomyelitis  Currently afebrile, hemodynamically stable  Cleared by vascular surgery for amputation  Encourage for strict nonweightbearing to right lower extremity at this time  Continue dressing changes per podiatrist recommendation  Podiatry recommendations appreciated  Patient scheduled for transmetatarsal resection today 09/03/2020    MRSA bacteremia  Assessment & Plan  2/2 culture from 08/28/2020 growing Staph aureus  Likely source foot wound  Continue IV vancomycin for now  Vascular surgery, Podiatry following  ID recommendations appreciated  Pharmacy consult for vancomycin management  Amputation of right great toe [facility])  Assessment & Plan  Now presented again with poor wound healing, wound dehiscence, possible cellulitis, osteomyelitis  Plan is as stated above under 1  And 2  PAD (peripheral artery disease) [facility])  Assessment & Plan  Patient history of recent right popliteal PTA/ stent 08/06/2020 and right hallux amputation  Now noted with osteomyelitis as evident on right foot MRI report  Cleared by vascular surgery for further debridement and/or amputation  Continue aspirin, statin, Plavix  Follow-up with vascular surgery recommendation as well as podiatry following  Hyperthyroidism  Assessment & Plan  · Diagnosed with thyroid cancer in July  · TSH 0 080  · Free T4 1 39  · Continue home dose of methimazole 5 mg daily  · Recommended close outpatient follow-up      Type 2 diabetes mellitus with diabetic polyneuropathy, with long-term current use of insulin [facility])  Assessment & Plan  Lab Results   Component Value Date    HGBA1C 8 1 (H) 2020       Recent Labs     20  1142 20  1532 20  2034 20  0615   POCGLU 100 111 138 95       Blood Sugar Average: Last 72 hrs:  · (P) 151 [id number]     Uncontrolled  Hemoglobin A1c 8 1  Blood sugars well controlled with current regimen of Lantus 35 units q h s  And Humalog 15 with meals    Will decrease the dose of Lantus to 20 units tonight in anticipation of surgery  Continue sliding scale  Continue to monitor sugars  Continue to monitor for hypoglycemia    Hypertension, essential  Assessment & Plan  · Controlled  · Continue anti-HTN meds          VTE Pharmacologic Prophylaxis:   Pharmacologic: held sec to surgery  Mechanical VTE Prophylaxis in Place: Yes    Patient Centered Rounds: I have performed bedside rounds with nursing staff today  Discussions with Specialists or Other Care Team Provider: cm    Education and Discussions with Family / Patient: patient and significant other    Time Spent for Care: 30 minutes  More than 50% of total time spent on counseling and coordination of care as described above  Current Length of Stay: 6 day(s)    Current Patient Status: Inpatient   Certification Statement: The patient will continue to require additional inpatient hospital stay due to above medical problems    Discharge Plan: in next 48 hours    Code Status: Level 1 - Full Code      Subjective:   Seen and examined  no complaints  Going for surgery today    Objective:     Vitals:   Temp (24hrs), Av 7 °F (36 5 °C), Min:97 3 °F (36 3 °C), Max:98 °F (36 7 °C)    Temp:  [97 3 °F (36 3 °C)-98 °F (36 7 °C)] 97 8 °F (36 6 °C)  HR:  [71] 71  Resp:  [17-18] 18  BP: (106-135)/(74-84) 118/74  SpO2:  [97 %] 97 %  Body mass index is 30 89 kg/m²  Input and Output Summary (last 24 hours):        Intake/Output Summary (Last 24 hours) at 9/3/2020 1203  Last data filed at 9/3/2020 0308  Gross per 24 hour   Intake 240 ml   Output 500 ml   Net -260 ml       Physical Exam:     Physical Exam  Vitals signs and nursing note reviewed  Constitutional:       Appearance: Normal appearance  HENT:      Head: Normocephalic and atraumatic  Nose: Nose normal    Neck:      Musculoskeletal: Normal range of motion and neck supple  Cardiovascular:      Rate and Rhythm: Normal rate and regular rhythm  Pulmonary:      Effort: Pulmonary effort is normal       Breath sounds: Normal breath sounds  Abdominal:      General: Abdomen is flat  Palpations: Abdomen is soft  Musculoskeletal: Normal range of motion  Comments: Foot wrapped in surgical bandage   Skin:     General: Skin is warm  Neurological:      Mental Status: He is alert  Additional Data:     Labs:    Results from last 7 days   Lab Units 09/01/20  0657   WBC Thousand/uL 9 07   HEMOGLOBIN g/dL 11 1*   HEMATOCRIT % 33 3*   PLATELETS Thousands/uL 403*   NEUTROS PCT % 55   LYMPHS PCT % 26   MONOS PCT % 12   EOS PCT % 5     Results from last 7 days   Lab Units 09/01/20  0657  08/28/20  1558   SODIUM mmol/L 136   < > 133*   POTASSIUM mmol/L 3 5   < > 4 2   CHLORIDE mmol/L 102   < > 96*   CO2 mmol/L 25   < > 26   BUN mg/dL 10   < > 19   CREATININE mg/dL 0 82   < > 0 94   ANION GAP mmol/L 9   < > 11   CALCIUM mg/dL 9 2   < > 9 1   ALBUMIN g/dL  --   --  3 0*   TOTAL BILIRUBIN mg/dL  --   --  0 50   ALK PHOS U/L  --   --  97   ALT U/L  --   --  44   AST U/L  --   --  50*   GLUCOSE RANDOM mg/dL 119   < > 182*    < > = values in this interval not displayed           Results from last 7 days   Lab Units 09/03/20  0615 09/02/20  2034 09/02/20  1532 09/02/20  1142 09/02/20  0724 09/01/20 2020 09/01/20  1653 09/01/20  1311 09/01/20  0705 08/31/20  2108 08/31/20  1612 08/31/20  1128   POC GLUCOSE mg/dl 95 138 111 100 100 282* 147* 127 119 211* 243* 195*     Results from last 7 days   Lab Units 08/28/20  1558   HEMOGLOBIN A1C % 8 1*     Results from last 7 days   Lab Units 08/28/20  1738 08/28/20  1558   LACTIC ACID mmol/L 1 3 2 2*       * I Have Reviewed All Lab Data Listed Above  * Additional Pertinent Lab Tests Reviewed: Pradeep 66 Admission Reviewed    Imaging:    Imaging Reports Reviewed Today Include:   Imaging Personally Reviewed by Myself Includes:      Recent Cultures (last 7 days):     Results from last 7 days   Lab Units 09/01/20  0703 09/01/20  0657 08/31/20  0615 08/30/20  1646 08/28/20  1558   BLOOD CULTURE  No Growth at 24 hrs  No Growth at 24 hrs  No Growth at 72 hrs    --  Methicillin Resistant Staphylococcus aureus*  Methicillin Resistant Staphylococcus aureus*   GRAM STAIN RESULT   --   --   --  Rare Polys*  2+ Gram negative rods*  2+ Gram positive cocci in pairs* Gram positive cocci in clusters*  Gram positive cocci in clusters*   WOUND CULTURE   --   --   --  2+ Growth of Morganella morganii*  2+ Growth of Methicillin Resistant Staphylococcus aureus*  1+ Growth of Enterobacter cloacae complex*  1+ Growth of Enterococcus faecalis*  --        Last 24 Hours Medication List:   Current Facility-Administered Medications   Medication Dose Route Frequency Provider Last Rate    [[facility] Hold] acetaminophen  650 mg Oral Q6H PRN Nils Mukherjee PA-C      [facility] Hold] aluminum-magnesium hydroxide-simethicone  30 mL Oral Q6H PRN Nils Mukherjee PA-C      [facility] Hold] aspirin  81 mg Oral Daily Nils Mukherjee PA-C      [facility] Hold] atorvastatin  40 mg Oral Daily With Cureatr GABRIEL Alfaro      [facility] Hold] clopidogrel  75 mg Oral Daily Helen Mendoza PA-C      [facility] Hold] insulin glargine  20 Units Subcutaneous HS Deneen Lozano MD      [facility] Hold] insulin lispro  1-5 Units Subcutaneous 4x Daily (AC & HS) Helen Mendoza PA-C      [facility] Hold] insulin lispro  15 Units Subcutaneous TID With Meals Serene Aguilar MD      [facility] Hold] LORazepam  0 5 mg Oral BID PRN Bong BENNETT GABRIEL Mendoza      [MAR Hold] losartan potassium-hydrochlorothiazide (HYZAAR 100/12  5) combo dose   Oral Daily Sunni Lainez MD      [facility] Hold] methimazole  5 mg Oral Daily [facility] NovemberGABRIEL      [facility]] ondansetron  4 mg Intravenous Q6H PRN [facility] November PA-SABRA      [facility]] pantoprazole  20 mg Oral Early Morning [facility], GABRIEL      [facility]] polyethylene glycol  17 g Oral Daily PRN [facility] November, GABRIEL      sodium chloride  100 mL/hr Intravenous Continuous José Miguel YAN  mL/hr (09/01/20 6056)    [[facility] Hold] vancomycin  2,000 mg Intravenous Q12H Serafin YAN MD 2,000 mg (09/03/20 0735)    [[facility] Hold] verapamil  240 mg Oral Daily Helen Mendoza PA-C      [facility]] zolpidem  10 mg Oral HS PRN [facility] November, GABRIEL          Today, Patient Was Seen By: Sunni Lainez MD    ** Please Note: Dictation voice to text software may have been used in the creation of this document   **

## 2020-09-03 NOTE — ASSESSMENT & PLAN NOTE
Lab Results   Component Value Date    HGBA1C 8 1 (H) 08/28/2020       Recent Labs     09/02/20  1142 09/02/20  1532 09/02/20 2034 09/03/20  0615   POCGLU 100 111 138 95       Blood Sugar Average: Last 72 hrs:  · (P) 151 [id number]     Uncontrolled  Hemoglobin A1c 8 1  Blood sugars well controlled with current regimen of Lantus 35 units q h s   And Humalog 15 with meals    Will decrease the dose of Lantus to 20 units tonight in anticipation of surgery  Continue sliding scale  Continue to monitor sugars  Continue to monitor for hypoglycemia

## 2020-09-03 NOTE — OCCUPATIONAL THERAPY NOTE
Occupational Therapy Cancellation        Patient Name: Lidya Ortiz  [id number] Date: 9/3/2020      OT orders received  Chart received performed  When OT spoke with pt's nurse, they reported pt was off unit for sx  OT will continue to follow and reattempt when appropriate      Kateryna Mzea MS OTR/L

## 2020-09-03 NOTE — ASSESSMENT & PLAN NOTE
Recent history of right hallux amputation  Presented with wound dehiscence, wound cellulitis, possible osteomyelitis  2/2 blood culture from 08/28/2020 growing MRSA  Right foot MRI report reviewed and noted for osteomyelitis  Currently afebrile, hemodynamically stable  Cleared by vascular surgery for amputation  Encourage for strict nonweightbearing to right lower extremity at this time  Continue dressing changes per podiatrist recommendation      Podiatry recommendations appreciated  Patient scheduled for transmetatarsal resection today 09/03/2020

## 2020-09-03 NOTE — ANESTHESIA POSTPROCEDURE EVALUATION
Post-Op Assessment Note    CV Status:  Stable  Pain Score: 0    Pain management: adequate     Mental Status:  Alert   Hydration Status:  Stable   PONV Controlled:  None   Airway Patency:  Patent      Post Op Vitals Reviewed: Yes      Staff: Anesthesiologist, CRNA         No complications documented      /67 (09/03/20 1330)    Temp 97 5 °F (36 4 °C) (09/03/20 1330)    Pulse 71 (09/03/20 1330)   Resp 15 (09/03/20 1330)    SpO2 94 % (09/03/20 1330)

## 2020-09-03 NOTE — PROGRESS NOTES
Vancomycin IV Pharmacy-to-Dose Consultation    Angelita Barclay is a 60 y o  male who is currently receiving Vancomycin IV with management by the Pharmacy Consult service  Assessment/Plan:  The patient was reviewed  Renal function is stable and no signs or symptoms of nephrotoxicity and/or infusion reactions were documented in the chart  Based on todays assessment, continue current vancomycin (day # 8) dosing of 2000mg IV q12h, with a plan for trough to be drawn at 0630 on 9/7  We will continue to follow the patients culture results and clinical progress daily      Gulshan Villar, Pharmacist

## 2020-09-03 NOTE — PROGRESS NOTES
Progress Note - Infectious Disease   Hector Antoine 60 y o  male MRN: [id number]  Unit/Bed#: -01 Encounter: [id number]      A/P:  1  MRSA bacteremia   Secondary to #2/3   Negative 2D echo   No intravascular prosthetic devices  Repeat blood culture remain negative      -continue IV vancomycin with dosing recommendations per pharmacy  -follow-up repeat blood cultures  -likely plan for 4 week course of IV antibiotic, if repeat blood cultures are negative   -hold off on PICC line placement ill blood cultures are negative at 72 hours     2  Right foot cellulitis   Suspect secondary to MRSA   Wound culture also shows Morganella, Enterobacter, Enterococcus, which is not unexpected finding in the setting of chronic ulceration   Would continue targeted therapy for MRSA infection      -antibiotic plan as above  -serial foot exams     3  Right foot nonhealing ulceration   With residual 1st and 2nd toe osteomyelitis   Podiatry input noted  Now s/p amputation on 9/3      -antibiotic plan as above  -daily local wound care and dressing change  -followup OR report  -close podiatry follow-up ongoing     4  Status post 1st digit amputation on 2020   Complicated by above      5  PAD   Status post recent revascularization   Vascular surgery input noted      6  Uncontrolled diabetes mellitus   Risk factor for severe infection      Vancomycin  POD0        Subjective:  Back from OR  Denies fevers, chills, or sweats  Denies nausea, vomiting, or diarrhea         Objective:  Vitals:  Temp:  [97 3 °F (36 3 °C)-97 8 °F (36 6 °C)] 97 3 °F (36 3 °C)  HR:  [55-72] 64  Resp:  [14-20] 20  BP: (105-134)/(67-84) 116/75  SpO2:  [93 %-97 %] 93 %  Temp (24hrs), Av 6 °F (36 4 °C), Min:97 3 °F (36 3 °C), Max:97 8 °F (36 6 °C)  Current: Temperature: (!) 97 3 °F (36 3 °C)    Physical Exam:   General:  No acute distress  Psychiatric:  Awake and alert  Pulmonary:  Normal respiratory excursion without accessory muscle use  Abdomen: Soft, nontender  Extremities:  No edema  Foot dressing intact  Skin:  No rashes    Lab Results:  I have personally reviewed pertinent labs  Results from last 7 days   Lab Units 09/01/20  0657 08/31/20  0615 08/30/20  0407  08/28/20  1558   POTASSIUM mmol/L 3 5 3 9 4 5   < > 4 2   CHLORIDE mmol/L 102 101 105   < > 96*   CO2 mmol/L 25 27 26   < > 26   BUN mg/dL 10 9 11   < > 19   CREATININE mg/dL 0 82 0 82 0 89   < > 0 94   EGFR ml/min/1 73sq m 98 98 95   < > 90   CALCIUM mg/dL 9 2 9 2 8 3   < > 9 1   AST U/L  --   --   --   --  50*   ALT U/L  --   --   --   --  44   ALK PHOS U/L  --   --   --   --  97    < > = values in this interval not displayed  Results from last 7 days   Lab Units 09/01/20  0657 08/31/20  0615 08/30/20  0407   WBC Thousand/uL 9 07 7 89 7 81   HEMOGLOBIN g/dL 11 1* 12 5 10 8*   PLATELETS Thousands/uL 403* 400* 327     Results from last 7 days   Lab Units 09/01/20  0703 09/01/20  0657 08/31/20  0615 08/30/20  1646 08/28/20  1558   BLOOD CULTURE  No Growth at 24 hrs  No Growth at 24 hrs  No Growth at 72 hrs  --  Methicillin Resistant Staphylococcus aureus*  Methicillin Resistant Staphylococcus aureus*   GRAM STAIN RESULT   --   --   --  Rare Polys*  2+ Gram negative rods*  2+ Gram positive cocci in pairs* Gram positive cocci in clusters*  Gram positive cocci in clusters*   WOUND CULTURE   --   --   --  2+ Growth of Morganella morganii*  2+ Growth of Methicillin Resistant Staphylococcus aureus*  1+ Growth of Enterobacter cloacae complex*  1+ Growth of Enterococcus faecalis*  --        Imaging Studies:   I have personally reviewed pertinent imaging study reports and images in PACS  EKG, Pathology, and Other Studies:   I have personally reviewed pertinent reports

## 2020-09-03 NOTE — PLAN OF CARE
Problem: Potential for Falls  Goal: Patient will remain free of falls  Description: INTERVENTIONS:  - Assess patient frequently for physical needs  -  Identify cognitive and physical deficits and behaviors that affect risk of falls    -  [place] fall precautions as indicated by assessment   - Educate patient/family on patient safety including physical limitations  - Instruct patient to call for assistance with activity based on assessment  - Modify environment to reduce risk of injury  - Consider OT/PT consult to assist with strengthening/mobility  Outcome: Progressing     Problem: PAIN - ADULT  Goal: Verbalizes/displays adequate comfort level or baseline comfort level  Description: Interventions:  - Encourage patient to monitor pain and request assistance  - Assess pain using appropriate pain scale  - Administer analgesics based on type and severity of pain and evaluate response  - Implement non-pharmacological measures as appropriate and evaluate response  - Consider cultural and social influences on pain and pain management  - Notify physician/advanced practitioner if interventions unsuccessful or patient reports new pain  Outcome: Progressing     Problem: INFECTION - ADULT  Goal: Absence or prevention of progression during hospitalization  Description: INTERVENTIONS:  - Assess and monitor for signs and symptoms of infection  - Monitor lab/diagnostic results  - Monitor all insertion sites, i e  indwelling lines, tubes, and drains  - Monitor endotracheal if appropriate and nasal secretions for changes in amount and color  - [place] appropriate cooling/warming therapies per order  - Administer medications as ordered  - Instruct and encourage patient and family to use good hand hygiene technique  - Identify and instruct in appropriate isolation precautions for identified infection/condition  Outcome: Progressing  Goal: Absence of fever/infection during neutropenic period  Description: INTERVENTIONS:  - Monitor WBC    Outcome: Progressing     Problem: SAFETY ADULT  Goal: Patient will remain free of falls  Description: INTERVENTIONS:  - Assess patient frequently for physical needs  -  Identify cognitive and physical deficits and behaviors that affect risk of falls    -  [place] fall precautions as indicated by assessment   - Educate patient/family on patient safety including physical limitations  - Instruct patient to call for assistance with activity based on assessment  - Modify environment to reduce risk of injury  - Consider OT/PT consult to assist with strengthening/mobility  Outcome: Progressing  Goal: Maintain or return to baseline ADL function  Description: INTERVENTIONS:  -  Assess patient's ability to carry out ADLs; assess patient's baseline for ADL function and identify physical deficits which impact ability to perform ADLs (bathing, care of mouth/teeth, toileting, grooming, dressing, etc )  - Assess/evaluate cause of self-care deficits   - Assess range of motion  - Assess patient's mobility; develop plan if impaired  - Assess patient's need for assistive devices and provide as appropriate  - Encourage maximum independence but intervene and supervise when necessary  - Involve family in performance of ADLs  - Assess for home care needs following discharge   - Consider OT consult to assist with ADL evaluation and planning for discharge  - Provide patient education as appropriate  Outcome: Progressing  Goal: Maintain or return mobility status to optimal level  Description: INTERVENTIONS:  - Assess patient's baseline mobility status (ambulation, transfers, stairs, etc )    - Identify cognitive and physical deficits and behaviors that affect mobility  - Identify mobility aids required to assist with transfers and/or ambulation (gait belt, sit-to-stand, lift, walker, cane, etc )  - [place] fall precautions as indicated by assessment  - Record patient progress and toleration of activity level on Mobility SBAR; progress patient to next Phase/Stage  - Instruct patient to call for assistance with activity based on assessment  - Consider rehabilitation consult to assist with strengthening/weightbearing, etc   Outcome: Progressing     Problem: DISCHARGE PLANNING  Goal: Discharge to home or other facility with appropriate resources  Description: INTERVENTIONS:  - Identify barriers to discharge w/patient and caregiver  - Arrange for needed discharge resources and transportation as appropriate  - Identify discharge learning needs (meds, wound care, etc )  - Arrange for interpretive services to assist at discharge as needed  - Refer to Case Management Department for coordinating discharge planning if the patient needs post-hospital services based on physician/advanced practitioner order or complex needs related to functional status, cognitive ability, or social support system  Outcome: Progressing     Problem: Knowledge Deficit  Goal: Patient/family/caregiver demonstrates understanding of disease process, treatment plan, medications, and discharge instructions  Description: Complete learning assessment and assess knowledge base  Interventions:  - Provide teaching at level of understanding  - Provide teaching via preferred learning methods  Outcome: Progressing     Problem: Nutrition/Hydration-ADULT  Goal: Nutrient/Hydration intake appropriate for improving, restoring or maintaining nutritional needs  Description: Monitor and assess patient's nutrition/hydration status for malnutrition  Collaborate with interdisciplinary team and initiate plan and interventions as ordered  Monitor patient's weight and dietary intake as ordered or per policy  Utilize nutrition screening tool and intervene as necessary  Determine patient's food preferences and provide high-protein, high-caloric foods as appropriate       INTERVENTIONS:  - Monitor oral intake, urinary output, labs, and treatment plans  - Assess nutrition and hydration status and recommend course of action  - Evaluate amount of meals eaten  - Assist patient with eating if necessary   - Allow adequate time for meals  - Recommend/ encourage appropriate diets, oral nutritional supplements, and vitamin/mineral supplements  - Order, calculate, and assess calorie counts as needed  - Recommend, monitor, and adjust tube feedings and TPN/PPN based on assessed needs  - Assess need for intravenous fluids  - Provide specific nutrition/hydration education as appropriate  - Include patient/family/caregiver in decisions related to nutrition  Outcome: Progressing

## 2020-09-03 NOTE — ANESTHESIA PREPROCEDURE EVALUATION
Procedure:  AMPUTATION TRANSMETATARSAL (TMA) (Right Foot)    Relevant Problems   CARDIO   (+) Hyperlipidemia   (+) Hypertension, essential      ENDO   (+) Hyperthyroidism   (+) Type 2 diabetes mellitus with diabetic polyneuropathy, with long-term current use of insulin (HCC)   (+) Type 2 diabetes mellitus with hyperglycemia, with long-term current use of insulin (HCC)      GI/HEPATIC   (+) Gastroesophageal reflux disease without esophagitis      /RENAL   (+) Acute kidney injury ([address] )      NEURO/PSYCH   (+) History of nonadherence to medical treatment      Other   (+) Diabetes mellitus (HCC)   (+) Obesity, Class I, BMI 30 0-34 9 (see actual BMI)   (+) Osteomyelitis (HCC)    Very poor dentition with multiple teeth missing; #8, 9, 23 loose; patient aware of increased risk of dental injury,         IMPRESSIONS:  No gross evidence of valvular vegetation, howevere this cannot rule out endocarditis  If clinically indicated recommend LACY for further evaluation     SUMMARY     LEFT VENTRICLE:  Systolic function was at the lower limits of normal  LVEF around 50%  Cannot rule mild hypokinesis of the mid inferoseptal wall(s)  There was mild concentric hypertrophy  Doppler parameters were consistent with abnormal left ventricular relaxation (grade 1 diastolic dysfunction)      LEFT ATRIUM:  The atrium was mildly dilated      MITRAL VALVE:  There was mild annular calcification  There was trace regurgitation      TRICUSPID VALVE:  There was trace regurgitation      Physical Exam    Airway    Mallampati score: III  TM Distance: >3 FB  Neck ROM: full     Dental       Cardiovascular  Cardiovascular exam normal    Pulmonary  Pulmonary exam normal     Other Findings        Anesthesia Plan  ASA Score- 3     Anesthesia Type- IV sedation with anesthesia with ASA Monitors  Additional Monitors:   Airway Plan:           Plan Factors-    Chart reviewed  EKG reviewed  Imaging results reviewed  Existing labs reviewed  Induction- intravenous  Postoperative Plan-     Informed Consent- Anesthetic plan and risks discussed with patient  I personally reviewed this patient with the CRNA  Discussed and agreed on the Anesthesia Plan with the CRNA  Vivian Alonzo

## 2020-09-03 NOTE — OP NOTE
OPERATIVE REPORT  PATIENT NAME: Rupa Ortez    :  1965  MRN: [id number]  Pt Location: [facility] OR ROOM 03    SURGERY DATE: 9/3/2020    Surgeon(s) and Role:     Bernadette Clifford DPM - Primary    Preop Diagnosis:  Cellulitis of right foot [L03 115]    Post-Op Diagnosis Codes:     * Cellulitis of right foot [Y58 009]    Procedure(s) (LRB):  AMPUTATION TRANSMETATARSAL (TMA) (Right)    Specimen(s):  ID Type Source Tests Collected by Time Destination   1 : right forefoot Tissue Foot, Right TISSUE EXAM Brianna Lindquist DPM 9/3/2020 1247        Estimated Blood Loss:   Minimal    Drains:  * No LDAs found *    Anesthesia Type:   IV Sedation with Anesthesia    Operative Indications:  Cellulitis of right foot [L03 115]  Osteomyelitis of the right foot     Operative Findings:  Soft grey bone  Eschar  Slough tissue  Malodor     Complications:   None    Procedure and Technique:  Under mild sedation the patient was brought back to the operating room and laid supine on the table  Once MAC sedation was achieved a local injection of 10 cc of a 1 1 mixture of 1% lidocaine plain and 0 5% Marcaine plain is injected into the right foot  An 18 inch ankle tourniquet was placed about the right ankle  Attention is then directed to the right foot  Nonhealing wound is noted at the first digit amputation site  There is a sharp and malodor and slough tissue  The right foot is then scrubbed prepped and draped in the usual manner  Using an Esmarch bandage the right foot is exsanguinated and the tourniquet is inflated 250 feet millimeters mercury  Using a skin markerAn incision line is delineated at the dorsal aspect of the right foot just proximal to the metatarsal heads  The incision line is then brought down to the plantar aspect to just proximal of the digits  An incision is made deep to bone  This was done dorsally first and then plantarly  Using a sagittal saw the first second third and fourth metatarsals are cut    The forefoot is then debrided away and passed off in toto  The plantar aspect of the forefoot is debrided of all adipose tissue  The sesamoids of the first metatarsal are removed and passed off in toto  There is a remnant of the fifth digit adhered to the plantar flap it is debrided away and passed off in toto  The area was then flushed with copious amount of normal sterile saline  All plantar tendons are pulled and cut  The area was again flushed with copious amounts of normal sterile saline  The plantar flap was then coapted to the dorsal flap using three-point 0 Vicryl simple interrupted sutures  The skin is coapted using three-point 0 nylon simple interrupted sutures  The tourniquet is deflated and a hyper wound noted to the right foot  He has been dressed using Xeroform DSD and an Ace wrap  He is then awoken from MAC sedation with all vital signs stable and neurovascular status intact  Patient has been transferred to PACU  He will, therefore short time and then returned to his room  I believe a surgical cure has been obtained      Patient Disposition:  PACU     SIGNATURE: Andressa Orozco DPM  DATE: September 3, 2020  TIME: 4:45 PM

## 2020-09-04 LAB
GLUCOSE SERPL-MCNC: 182 MG/DL (ref 65–140)
GLUCOSE SERPL-MCNC: 190 MG/DL (ref 65–140)
GLUCOSE SERPL-MCNC: 214 MG/DL (ref 65–140)

## 2020-09-04 PROCEDURE — 97168 OT RE-EVAL EST PLAN CARE: CPT

## 2020-09-04 PROCEDURE — 97164 PT RE-EVAL EST PLAN CARE: CPT

## 2020-09-04 PROCEDURE — 82948 REAGENT STRIP/BLOOD GLUCOSE: CPT

## 2020-09-04 PROCEDURE — 99233 SBSQ HOSP IP/OBS HIGH 50: CPT | Performed by: INTERNAL MEDICINE

## 2020-09-04 PROCEDURE — 99232 SBSQ HOSP IP/OBS MODERATE 35: CPT | Performed by: INTERNAL MEDICINE

## 2020-09-04 RX ORDER — IBUPROFEN 600 MG/1
600 TABLET ORAL EVERY 8 HOURS PRN
Status: DISCONTINUED | OUTPATIENT
Start: 2020-09-04 | End: 2020-09-05

## 2020-09-04 RX ADMIN — VANCOMYCIN HYDROCHLORIDE 2000 MG: 1 INJECTION, POWDER, LYOPHILIZED, FOR SOLUTION INTRAVENOUS at 19:53

## 2020-09-04 RX ADMIN — METHIMAZOLE 5 MG: 5 TABLET ORAL at 09:23

## 2020-09-04 RX ADMIN — INSULIN GLARGINE 20 UNITS: 100 INJECTION, SOLUTION SUBCUTANEOUS at 22:21

## 2020-09-04 RX ADMIN — ATORVASTATIN CALCIUM 40 MG: 40 TABLET, FILM COATED ORAL at 17:34

## 2020-09-04 RX ADMIN — LOSARTAN POTASSIUM: 50 TABLET, FILM COATED ORAL at 09:23

## 2020-09-04 RX ADMIN — ASPIRIN 81 MG 81 MG: 81 TABLET ORAL at 09:23

## 2020-09-04 RX ADMIN — CLOPIDOGREL BISULFATE 75 MG: 75 TABLET ORAL at 09:23

## 2020-09-04 RX ADMIN — VERAPAMIL HYDROCHLORIDE 240 MG: 120 TABLET, FILM COATED, EXTENDED RELEASE ORAL at 09:23

## 2020-09-04 RX ADMIN — VANCOMYCIN HYDROCHLORIDE 2000 MG: 1 INJECTION, POWDER, LYOPHILIZED, FOR SOLUTION INTRAVENOUS at 09:25

## 2020-09-04 NOTE — PLAN OF CARE
Problem: PHYSICAL THERAPY ADULT  Goal: Performs mobility at highest level of function for planned discharge setting  See evaluation for individualized goals  Description: Treatment/Interventions: Functional transfer training, LE strengthening/ROM, Therapeutic exercise, Endurance training, Patient/family training, Equipment eval/education, Bed mobility, Compensatory technique education, Spoke to nursing, OT  Equipment Recommended: Other (Comment)(wheelchair, to be determined at rehab)       See flowsheet documentation for full assessment, interventions and recommendations  Outcome: Progressing  Note: Prognosis: Good  Problem List: Decreased endurance, Impaired balance, Decreased mobility, Pain, Orthopedic restrictions, Decreased safety awareness, Impaired judgement  Assessment: Pt is 60 y o  male seen for PT evaluation s/p admit to [facility] on 8/28/2020 w/ Osteomyelitis ([facility] [address] )  PT consulted to assess pt's functional mobility and d/c needs  Order placed for PT eval and tx, w/ up and OOB as tolerated order  Comorbidities affecting pt's physical performance at time of assessment include: amputation of right great toe, wound infection of L toes s/p transmetatarsal amputation, type 2 diabetes mellitus, and HTN  PTA, pt was independent w/ all functional mobility w/ no assistive device, ambulates household distances, lives w/ spouse in 1 level house and on disability  Personal factors affecting pt at time of IE include: inaccessible home environment, stairs to enter home, inability to ambulate household distances, inability to navigate community distances, inability to navigate level surfaces w/o external assistance, positive fall history, impulsivity, inability to perform IADLs and inability to perform ADLs   Please find objective findings from PT assessment regarding body systems outlined above with impairments and limitations including impaired balance, decreased endurance, pain, decreased activity tolerance, decreased functional mobility tolerance, altered sensation, decreased safety awareness, impaired judgement, fall risk, orthopedic restrictions and decreased skin integrity  The following objective measures performed on IE also reveal limitations: Barthel Index: 55/100 and Modified [place]: 4 (moderate/severe disability)  Pt's clinical presentation is currently unstable/unpredictable seen in pt's presentation of continued need for medical management and monitoring, NWB on RLE resulting in an inability to perform ambulation without increased assistance, impaired balance and endurance resulting in an increased risk for falls and impaired safety awareness  Pt to benefit from continued PT tx to address deficits as defined above and maximize level of functional independent mobility and consistency  From PT/mobility standpoint, recommendation at time of d/c would be IP rehab pending progress in order to facilitate return to PLOF  Barriers to Discharge: Inaccessible home environment     PT Discharge Recommendation: [address]     PT - OK to Discharge: Yes    See flowsheet documentation for full assessment

## 2020-09-04 NOTE — PROGRESS NOTES
Progress Note - Infectious Disease   Aym Reasons 60 y o  male MRN: [id number]  Unit/Bed#: -01 Encounter: [id number]      A/P:  1  MRSA bacteremia   Secondary to #2/3   Negative 2D echo   No intravascular prosthetic devices   Repeat blood culture remain negative      -continue IV vancomycin with dosing recommendations per pharmacy  -plan for 4 week course of IV antibiotics from negative blood culture, through 9/27   -check weekly CBC with diff, creatinine, vancomycin level  -hold off on PICC line placement ill blood cultures are negative at 72 hours  -outpatient ID follow-up after hospital discharge     2  Right foot cellulitis   Suspect secondary to MRSA   Wound culture also shows Morganella, Enterobacter, Enterococcus, which is not unexpected finding in the setting of chronic ulceration   Would continue targeted therapy for MRSA infection  Now status post TMA     -antibiotic plan as above  -serial foot exams     3  Right foot nonhealing ulceration   With residual 1st and 2nd toe osteomyelitis   Podiatry input noted  Now s/p TMA on 09/03      -antibiotic plan as above  -daily local wound care and dressing change  -outpatient podiatry follow-up     4  Status post 1st digit amputation on 08/11/2020   Complicated by above      5  PAD   Status post recent revascularization   Vascular surgery input noted      6  Uncontrolled diabetes mellitus   Risk factor for severe infection      Vancomycin  POD1    Patient will be going to rehab facility after hospital discharge  I discussed above plan with Dr Maria Eugenia Bergman from Internal Medicine service, and with patient  Will plan to formally re-evaluate patient on 09/07  Please call us with any new questions in the interim          Subjective:  Nausea and vomiting overnight presumed secondary to pain medications  Improving today  No fevers, chills  Objective:  Vitals:     No data recorded    Current: Temperature: (!) 97 3 °F (36 3 °C)    Physical Exam:   General: No acute distress, nontoxic, resting comfortably in bed  HEENT:  Atraumatic normocephalic  Psychiatric:  Awake and alert  Pulmonary:  Normal respiratory excursion without accessory muscle use  Abdomen:  Soft, nontender  Extremities:  No edema  Foot dressing intact with no ascending erythema  Skin:  No new rashes    Lab Results:  I have personally reviewed pertinent labs  Results from last 7 days   Lab Units 09/01/20  0657 08/31/20  0615 08/30/20  0407  08/28/20  1558   POTASSIUM mmol/L 3 5 3 9 4 5   < > 4 2   CHLORIDE mmol/L 102 101 105   < > 96*   CO2 mmol/L 25 27 26   < > 26   BUN mg/dL 10 9 11   < > 19   CREATININE mg/dL 0 82 0 82 0 89   < > 0 94   EGFR ml/min/1 73sq m 98 98 95   < > 90   CALCIUM mg/dL 9 2 9 2 8 3   < > 9 1   AST U/L  --   --   --   --  50*   ALT U/L  --   --   --   --  44   ALK PHOS U/L  --   --   --   --  97    < > = values in this interval not displayed  Results from last 7 days   Lab Units 09/01/20  0657 08/31/20  0615 08/30/20  0407   WBC Thousand/uL 9 07 7 89 7 81   HEMOGLOBIN g/dL 11 1* 12 5 10 8*   PLATELETS Thousands/uL 403* 400* 327     Results from last 7 days   Lab Units 09/01/20  0703 09/01/20  0657 08/31/20  0615 08/30/20  1646 08/28/20  1558   BLOOD CULTURE  No Growth at 48 hrs  No Growth at 48 hrs  No Growth After 4 Days  --  Methicillin Resistant Staphylococcus aureus*  Methicillin Resistant Staphylococcus aureus*   GRAM STAIN RESULT   --   --   --  Rare Polys*  2+ Gram negative rods*  2+ Gram positive cocci in pairs* Gram positive cocci in clusters*  Gram positive cocci in clusters*   WOUND CULTURE   --   --   --  2+ Growth of Morganella morganii*  2+ Growth of Methicillin Resistant Staphylococcus aureus*  1+ Growth of Enterobacter cloacae complex*  1+ Growth of Enterococcus faecalis*  --        Imaging Studies:   I have personally reviewed pertinent imaging study reports and images in PACS      EKG, Pathology, and Other Studies:   I have personally reviewed pertinent reports

## 2020-09-04 NOTE — PROGRESS NOTES
Went into pts room to give PM meds and check blood sugar  Pt is refusing and telling staff not to disturb him  I expressed the importance of his insulin, but still refuses  SLIM made aware

## 2020-09-04 NOTE — PROGRESS NOTES
[address] Internal Medicine Progress Note  Patient: Angelita Barclay 60 y o  male   MRN: [id number]  PCP: Maren Sevilla MD  Unit/Bed#: -01 Encounter: [id number]  Date Of Visit: 09/04/20    Assessment:    Principal Problem:    Osteomyelitis ([address] )  Active Problems:    Hypertension, essential    Gastroesophageal reflux disease without esophagitis    Hyperlipidemia    Type 2 diabetes mellitus with diabetic polyneuropathy, with long-term current use of insulin ([facility])    Hyperthyroidism    PAD (peripheral artery disease) ([facility])    Amputation of right great toe ([facility])    MRSA bacteremia      Plan:  MRSA bacteremia secondary to right foot cellulitis  Postop day 2 status post resection  Continue IV antibiotic course for 4 weeks  Continue to follow-up  ID input and recommendations appreciated    Peripheral vascular disease status post revascularization  Vascular surgery input appreciated    Type 2 diabetes  Blood sugar stable    VTE Pharmacologic Prophylaxis:   Pharmacologic: Heparin  Mechanical VTE Prophylaxis in Place: Yes    Patient Centered Rounds: I have performed bedside rounds with nursing staff today  Discussions with Specialists or Other Care Team Provider:  Id and Podiatry    Education and Discussions with Family / Patient:  Daughter    Time Spent for Care: 30 minutes  More than 50% of total time spent on counseling and coordination of care as described above  Current Length of Stay: 7 day(s)    Current Patient Status: Inpatient   Certification Statement: The patient will continue to require additional inpatient hospital stay due to MRSA infection    Discharge Plan:  Continue hospitalization for PT evaluation PICC line placement  Code Status: Level 1 - Full Code      Subjective:   Patient seen and examined  He had nausea and vomiting overnight he thinks secondary to oxycodone pills  Currently resolved no acute issues at this time    Objective:     Vitals:   No data recorded         Body mass index is 30 89 kg/m²  Input and Output Summary (last 24 hours): Intake/Output Summary (Last 24 hours) at 9/4/2020 1838  Last data filed at 9/4/2020 1100  Gross per 24 hour   Intake 120 ml   Output 400 ml   Net -280 ml       Physical Exam:     Alert and oriented  Mucous membranes are moist  Lungs are clear to auscultation  Heart sounds are regular S1-S2  Abdomen soft nontender  Extremities right foot wrapped in surgical bandage    Additional Data:     Labs:    Results from last 7 days   Lab Units 09/01/20  0657   WBC Thousand/uL 9 07   HEMOGLOBIN g/dL 11 1*   HEMATOCRIT % 33 3*   PLATELETS Thousands/uL 403*   NEUTROS PCT % 55   LYMPHS PCT % 26   MONOS PCT % 12   EOS PCT % 5     Results from last 7 days   Lab Units 09/01/20  0657   POTASSIUM mmol/L 3 5   CHLORIDE mmol/L 102   CO2 mmol/L 25   BUN mg/dL 10   CREATININE mg/dL 0 82   CALCIUM mg/dL 9 2           * I Have Reviewed All Lab Data Listed Above  * Additional Pertinent Lab Tests Reviewed: [address] Admission Reviewed    Imaging:    Imaging Reports Reviewed Today Include:   Imaging Personally Reviewed by Myself Includes:  Recent Cultures (last 7 days):     Results from last 7 days   Lab Units 09/01/20  0703 09/01/20  0657 08/31/20  0615 08/30/20  1646   BLOOD CULTURE  No Growth at 48 hrs  No Growth at 48 hrs  No Growth After 4 Days    --    GRAM STAIN RESULT   --   --   --  Rare Polys*  2+ Gram negative rods*  2+ Gram positive cocci in pairs*   WOUND CULTURE   --   --   --  2+ Growth of Morganella morganii*  2+ Growth of Methicillin Resistant Staphylococcus aureus*  1+ Growth of Enterobacter cloacae complex*  1+ Growth of Enterococcus faecalis*       Last 24 Hours Medication List:   Current Facility-Administered Medications   Medication Dose Route Frequency Provider Last Rate    acetaminophen  975 mg Oral Q8H [address] [facility], MD      aluminum-magnesium hydroxide-simethicone  30 mL Oral Q6H PRN Carmelo Stephens DPM      aspirin  81 mg Oral Daily Destin Watson      atorvastatin  40 mg Oral Daily With [facility], DPM      clopidogrel  75 mg Oral Daily Destin Watson      insulin glargine  20 Units Subcutaneous HS Arlene Bear DPM      insulin lispro  1-5 Units Subcutaneous 4x Daily [facility]) Arlene Bear DPM      insulin lispro  15 Units Subcutaneous TID With Meals Arlene Bear DPM      LORazepam  0 5 mg Oral BID PRN Arlene Bear DPM      losartan potassium-hydrochlorothiazide (HYZAAR 100/12  5) combo dose   Oral Daily Arlene Bear DPM      methimazole  5 mg Oral Daily Destin Watson      ondansetron  4 mg Intravenous Q6H PRN Arlene Bear DPM      oxyCODONE  10 mg Oral Q6H PRN Bing Pulido MD      oxyCODONE  5 mg Oral Q6H PRN Bing Pulido MD      pantoprazole  20 mg Oral Early Morning Arlene Bear DPM      polyethylene glycol  17 g Oral Daily PRN Arlene Bear DPM      sodium chloride  100 mL/hr Intravenous Continuous Arlene Bear DPM Stopped (09/04/20 0349)    vancomycin  2,000 mg Intravenous Q12H Arlene Bear DPM 2,000 mg (09/04/20 0925)    verapamil  240 mg Oral Daily Arlene Bear DPM      zolpidem  10 mg Oral HS PRN Arlene Bear DPM          Today, Patient Was Seen By: Bing Pulido MD    ** Please Note: Dragon 360 Dictation voice to text software may have been used in the creation of this document   **

## 2020-09-04 NOTE — UTILIZATION REVIEW
Continued Stay Review    Date: 9/4                     Current Patient Class: IP Current Level of Care:  MS    HPI:55 y o  male initially admitted on 8/28 for OM , MRSA bacteremia     Assessment/Plan: cont on IV abx , MRSA bacteremia sec to R foot cellulitis   Neg echo   Repeat BC remain neg   Cont IV avnco   Plan for 4 week course through 9/27  Hold off on PICC line placement till [facility] are neg for 72 hrs  S/p TMA serial foot exams        9/4 OT eval   Acute rehab service upon DC     9/3 OP note   AMPUTATION TRANSMETATARSAL   Operative Findings:  Soft grey bone  Eschar  Slough tissue  Malodor     Pertinent Labs/Diagnostic Results:   9/3  R foot xray    Interval first through fourth transmetatarsal and fifth toe amputation with expected postsurgical changes    No radiographic evidence of osteomyelitis      Results from last 7 days   Lab Units 09/01/20  0657 08/31/20  0615 08/30/20  0407 08/29/20  0519 08/28/20  1558   WBC Thousand/uL 9 07 7 89 7 81 9 52 8 93   HEMOGLOBIN g/dL 11 1* 12 5 10 8* 11 2* 12 3   HEMATOCRIT % 33 3* 37 7 33 4* 34 2* 36 4*   PLATELETS Thousands/uL 403* 400* 327 334 366   NEUTROS ABS Thousands/µL 5 06  --   --   --  5 16     Results from last 7 days   Lab Units 09/01/20  0657 08/31/20  0615 08/30/20  0407 08/29/20  0519 08/28/20  1558   SODIUM mmol/L 136 136 139 135* 133*   POTASSIUM mmol/L 3 5 3 9 4 5 4 3 4 2   CHLORIDE mmol/L 102 101 105 100 96*   CO2 mmol/L 25 27 26 26 26   ANION GAP mmol/L 9 8 8 9 11   BUN mg/dL 10 9 11 13 19   CREATININE mg/dL 0 82 0 82 0 89 0 93 0 94   EGFR ml/min/1 73sq m 98 98 95 91 90   CALCIUM mg/dL 9 2 9 2 8 3 8 7 9 1     Results from last 7 days   Lab Units 08/28/20  1558   AST U/L 50*   ALT U/L 44   ALK PHOS U/L 97   TOTAL PROTEIN g/dL 8 4*   ALBUMIN g/dL 3 0*   TOTAL BILIRUBIN mg/dL 0 50     Results from last 7 days   Lab Units 09/04/20  4017 09/03/20  2220 09/03/20  1847 09/03/20  1653 09/03/20  1329 09/03/20  1203 09/03/20  0615 09/02/20  2034 09/02/20  1532 09/02/20  1142 09/02/20  0724 09/01/20  2020   POC GLUCOSE mg/dl 182* 164* 126 121 112 108 95 138 111 100 100 282*     Results from last 7 days   Lab Units 09/01/20  0657 08/31/20  0615 08/30/20  0407 08/29/20  0519 08/28/20  1558   GLUCOSE RANDOM mg/dL 119 113 124 76 182*     Results from last 7 days   Lab Units 08/28/20  1558   HEMOGLOBIN A1C % 8 1*   EAG mg/dl 186       Results from last 7 days   Lab Units 08/28/20  1558   TSH 3RD GENERATON uIU/mL 0 080*     Results from last 7 days   Lab Units 08/28/20  1738 08/28/20  1558   LACTIC ACID mmol/L 1 3 2 2*     Results from last 7 days   Lab Units 09/01/20  0703 09/01/20  0657 08/31/20  0615 08/30/20  1646 08/28/20  1558   BLOOD CULTURE  No Growth at 48 hrs  No Growth at 48 hrs  No Growth After 4 Days    --  Methicillin Resistant Staphylococcus aureus*  Methicillin Resistant Staphylococcus aureus*   GRAM STAIN RESULT   --   --   --  Rare Polys*  2+ Gram negative rods*  2+ Gram positive cocci in pairs* Gram positive cocci in clusters*  Gram positive cocci in clusters*   WOUND CULTURE   --   --   --  2+ Growth of Morganella morganii*  2+ Growth of Methicillin Resistant Staphylococcus aureus*  1+ Growth of Enterobacter cloacae complex*  1+ Growth of Enterococcus faecalis*  --      Vital Signs:   09/03/20 1400      64   20   116/75      93 %          09/03/20 1345   97 3 °F (36 3 °C)Abnormal     68   20   114/74      95 %   None (Room air)   [facility]    09/03/20 1330   97 5 °F (36 4 °C)   71   14   105/67      96 %   None (Room air)   [facility]    09/03/20 1156   97 8 °F (36 6 °C)   71   18   118/74      97 %   None (Room air)       09/03/20 07:32:53   97 8 °F (36 6 °C)         132/84   100             Medications:   Scheduled Medications:  acetaminophen, 975 mg, Oral, Q8H TESS  aspirin, 81 mg, Oral, Daily  atorvastatin, 40 mg, Oral, Daily With Dinner  clopidogrel, 75 mg, Oral, Daily  insulin glargine, 20 Units, Subcutaneous, HS  insulin lispro, 1-5 Units, Subcutaneous, 4x Daily (AC & HS)  insulin lispro, 15 Units, Subcutaneous, TID With Meals  losartan potassium-hydrochlorothiazide (HYZAAR 100/12  5) combo dose, , Oral, Daily  methimazole, 5 mg, Oral, Daily  pantoprazole, 20 mg, Oral, Early Morning  vancomycin, 2,000 mg, Intravenous, Q12H  verapamil, 240 mg, Oral, Daily    Continuous IV Infusions:  sodium chloride, 100 mL/hr, Intravenous, Continuous    PRN Meds:  aluminum-magnesium hydroxide-simethicone, 30 mL, Oral, Q6H PRN  LORazepam, 0 5 mg, Oral, BID PRN  ondansetron, 4 mg, Intravenous, Q6H PRN  oxyCODONE, 10 mg, Oral, Q6H PRN  oxyCODONE, 5 mg, Oral, Q6H PRN  polyethylene glycol, 17 g, Oral, Daily PRN  zolpidem, 10 mg, Oral, HS PRN        Discharge Plan: TBD    Network Utilization Review Department  [email or web address]  ATTENTION: Please call with any questions or concerns to [phone] and carefully listen to the prompts so that you are directed to the right person  All voicemails are confidential   Jaz Skipper all requests for admission clinical reviews, approved or denied determinations and any other requests to dedicated fax number below belonging to the campus where the patient is receiving treatment   List of dedicated fax numbers for the Facilities:  [address] DENIALS (Administrative/Medical Necessity) [phone]   [address] [place] [address] (Maternity/NICU/Pediatrics) [phone]   Dimitri Montano [phone]   Michel Evans [phone]   Karley Zamudio [phone]   Davi Dover [phone]   [address] [facility] [address] [place] [address] [phone]   [facility]  [phone]   [address] [facility] [address] [facility]  [facility] [facility] [address] [facility] [phone]

## 2020-09-04 NOTE — PLAN OF CARE
Problem: OCCUPATIONAL THERAPY ADULT  Goal: Performs self-care activities at highest level of function for planned discharge setting  See evaluation for individualized goals  Description: Treatment Interventions: ADL retraining, Functional transfer training, UE strengthening/ROM, Endurance training, Patient/family training, Equipment evaluation/education, Compensatory technique education, Energy conservation, Activityengagement, Continued evaluation          See flowsheet documentation for full assessment, interventions and recommendations  Note: Limitation: Decreased ADL status, Decreased Safe judgement during ADL, Decreased endurance  Prognosis: Fair  Assessment: Patient is a 60 y o  male seen for OT re-evaluation s/p admit to [address] [facility] on 8/28/2020 w/Osteomyelitis [facility])  Commorbidities affecting patient's functional performance at time of assessment include: MRSA bacteremia, amputation of right great toe, PAD, hyperthyroidism, type 2 diabetes mellitus with diabetic polyneuropathy with long-term current use of insulin, and essential HTN  Orders placed for OT evaluation and treatment NWB and RLE per podiatry  Performed at least two patient identifiers during session including name and wristband  Please refer to initial OT evaluation on 8/31/20 for details on home setup and PLOF  Personal factors affecting patient at time of initial evaluation include: steps to enter, non compliance, difficulty performing ADLs, difficulty performing IADLs and health management  Upon re-evaluation, patient requires supervision and set up assist for UB ADLs, minimal  assist for LB ADLs, transfers with minimal  assist x2; trailed with use of axillary crutches and transitioned to sit pivot transfer from bed to/from wheelchair  Patient continues to have difficulty maintaining NWB status during transfers, requiring max VCs  Patient continues to demonstrate decreased safety awareness, impulsivity, and non-compliance  Patient's significant other was present during session and was also educated on safety and DME/AD  Patient is alert and oriented x 4, presents with impaired judgement, inability to make safe decisions  Occupational performance is affected by the following deficits: impulsive behavior, dynamic sit/ stand balance deficit with poor standing tolerance time for self care and functional mobility, decreased activity tolerance, decreased safety awareness, decreased coping skills and postural control and postural alignment deficit, requiring external assistance to complete transitional movements  Patient to benefit from continued Occupational Therapy treatment while in the hospital to address deficits as defined above and maximize level of functional independence with ADLs and functional mobility  Occupational Performance areas to address include: grooming , bathing/ shower, dressing, toilet hygiene, transfer to all surfaces, functional mobility, community mobility, health maintenance, IADLs: safety procedures, Leisure Participation and Social participation  From OT standpoint, recommendation at time of d/c would be post-acute rehabilitation services         OT Discharge Recommendation: Post-Acute Rehabilitation Services

## 2020-09-04 NOTE — PHYSICAL THERAPY NOTE
Physical Therapy Evaluation     Patient's Name: Carolina Weaver    Admitting Diagnosis  Post op infection [T81 40XA]  Cellulitis of right foot [L03 115]    Problem List  Patient Active Problem List   Diagnosis    Hypertension, essential    Gastroesophageal reflux disease without esophagitis    Hyperlipidemia    History of nonadherence to medical treatment    Obesity, Class I, BMI 30 0-34 9 (see actual BMI)    Type 2 diabetes mellitus with hyperglycemia, with long-term current use of insulin [facility]    Diabetes mellitus ([facility]    Type 2 diabetes mellitus with diabetic polyneuropathy, with long-term current use of insulin ([facility])    Vertigo    Right-sided tinnitus    Colon polyps    Elevated liver enzymes    Hyperthyroidism    Health maintenance examination    Gangrene of toe of right foot ([facility]    Severe sepsis (HCC)    Acute kidney injury ([facility]    Hyponatremia    PAD (peripheral artery disease) ([facility] )    Amputation of right great toe (HCC)    Phantom 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 ([facility]    [facility]    Osteomyelitis ([facility]    MRSA bacteremia       Past Medical History  Past Medical History:   Diagnosis Date    Diabetes mellitus ([facility] )     Hyperlipidemia     Hypertension     Thyroid cancer ([facility] )        Past Surgical History  Past Surgical History:   Procedure Laterality Date    FOOT SURGERY Right 2014    IR AORTAGRAM WITH RUN-OFF  8/6/2020    TN AMPUTATION FOOT,TRANSMETATARSAL Right 9/3/2020    Procedure: AMPUTATION TRANSMETATARSAL (TMA);  Surgeon: Chu Freeman DPM;  [facility]: [facility];  Service: Podiatry    TOE AMPUTATION Right 8/11/2020    Procedure: AMPUTATION TOE;  Surgeon: Chu Freeman DPM;  [facility]: [facility];  Service: Podiatry    US GUIDED THYROID BIOPSY  7/2/2020 09/04/20 1201   Note Type   Note type Eval only   Pain Assessment   Pain Assessment Tool 0-10   Pain Score No Pain   Home Living   Additional Comments See initial evaluation performed on 8/31 for details   Prior Function   Comments See initial evaluation performed on 8/31 for details   Restrictions/Precautions   Weight Bearing Precautions Per Order Yes   RLE Weight Bearing Per Order NWB   Braces or Orthoses Other (Comment)  (none per patient)   Other Precautions Fall Risk; Chair Alarm; Bed Alarm;WBS;Multiple lines;Pain; Impulsive   General   Family/Caregiver Present Yes  (wife)   Cognition   Overall Cognitive Status WFL   Arousal/Participation Alert   Attention Within functional limits   Orientation Level Oriented X4   Memory Within functional limits   Following Commands Follows all commands and directions without difficulty   Comments Pt agreeable to participate in PT evaluation   RUE Assessment   RUE Assessment WFL   LUE Assessment   LUE Assessment WFL   RLE Assessment   RLE Assessment WFL   LLE Assessment   LLE Assessment WFL   Coordination   Sensation WFL   Light Touch   RLE Light Touch Grossly intact   LLE Light Touch Grossly intact   Bed Mobility   Supine to Sit 5  Supervision   Additional items Assist x 1; Increased time required; Bedrails   Sit to Supine 5  Supervision   Additional items Assist x 1; Increased time required; Bedrails   Additional Comments Pt received at start of evaluation supine in bed   Transfers   Sit to Stand 4  Minimal assistance  (with axillary crutches)   Additional items Assist x 1;Bedrails  (Pt with difficult maintaining NWB RLE)   Stand to Sit 4  Minimal assistance  (w/ axillary crutches)   Additional items Assist x 1;Bedrails; Increased time required  (Pt with difficult maintaining NWB RLE)   Sit pivot 5  Supervision   Additional items Assist x 1; Increased time required; Bedrails  (Sit pivot from bed to/from wheelchair )   Additional Comments Pt with improved ability to maintain NWB on RLE during sit pivot transfers   Ambulation/Elevation   Gait pattern Not tested  (Deferred due to poor ability to maintain NWB RLE)   Balance   Static Sitting Normal   Dynamic Sitting Good Static Standing Fair -   Activity Tolerance   Activity Tolerance Other (Comment)  (Pt demonstrated increased frustration with NWB restrictions)   Medical Staff Made Aware ILA Toure   Nurse Made Aware BONI MODI confirmed pt appropriate for PT services and made aware of session outcomes and recommendation for sit pivot transfers to/from wheelchair   Assessment   Prognosis Good   Problem List Decreased endurance; Impaired balance;Decreased mobility;Pain;Orthopedic restrictions;Decreased safety awareness; Impaired judgement   Assessment Pt is 60 y o  male seen for PT evaluation s/p admit [place] [facility] on 8[place]28/2020 w/ Osteomyelitis ([address] )  PT consulted to assess pt's functional mobility and d/c needs  Order placed for PT eval and tx, w/ up and OOB as tolerated order  Comorbidities affecting pt's physical performance at time of assessment include: amputation of right great toe, wound infection of L toes s/p transmetatarsal amputation, type 2 diabetes mellitus, and HTN  PTA, pt was independent w/ all functional mobility w/ no assistive device, ambulates household distances, lives w/ spouse in 1 level house and on disability  Personal factors affecting pt at time of IE include: inaccessible home environment, stairs to enter home, inability to ambulate household distances, inability to navigate community distances, inability to navigate level surfaces w/o external assistance, positive fall history, impulsivity, inability to perform IADLs and inability to perform ADLs  Please find objective findings from PT assessment regarding body systems outlined above with impairments and limitations including impaired balance, decreased endurance, pain, decreased activity tolerance, decreased functional mobility tolerance, altered sensation, decreased safety awareness, impaired judgement, fall risk, orthopedic restrictions and decreased skin integrity   The following objective measures performed on IE also reveal limitations: Barthel Index: 55/100 and Modified Meade: 4 (moderate/severe disability)  Pt's clinical presentation is currently unstable/unpredictable seen in pt's presentation of continued need for medical management and monitoring, NWB on RLE resulting in an inability to perform ambulation without increased assistance, impaired balance and endurance resulting in an increased risk for falls and impaired safety awareness  Pt to benefit from continued PT tx to address deficits as defined above and maximize level of functional independent mobility and consistency  From PT/mobility standpoint, recommendation at time of d/c would be IP rehab pending progress in order to facilitate return to PLOF  Barriers to Discharge Inaccessible home environment   Goals   Patient Goals "To walk to the bathroom"   STG Expiration Date 09/14/20   Short Term Goal #1 In 7-10 days: Perform all bed mobility tasks modified independent to decrease caregiver burden, Perform all transfers modified independent to improve independence and Tolerate 4 hr OOB to faciliate upright tolerance   Plan   Treatment/Interventions Functional transfer training;LE strengthening/ROM; Therapeutic exercise; Endurance training;Patient/family training;Equipment eval/education; Bed mobility; Compensatory technique education;Spoke to nursing;OT   PT Frequency Other (Comment)  (3-5x/wk)   Recommendation   PT Discharge Recommendation Post-Acute Rehabilitation Services   Equipment Recommended Other (Comment)  (wheelchair, to be determined at rehab)   PT - OK to Discharge Yes   Additional Comments when medically cleared if to rehab   Modified Meade Scale   Modified Nirali Scale 4   Barthel Index   Feeding 10   Bathing 0   Grooming Score 5   Dressing Score 10   Bladder Score 10   Bowels Score 10   Toilet Use Score 5   Transfers (Bed/Chair) Score 5   Mobility (Level Surface) Score 0   Stairs Score 0   Barthel Index Score 55           Lilli Sharma, PT

## 2020-09-04 NOTE — PLAN OF CARE
Problem: Potential for Falls  Goal: Patient will remain free of falls  Description: INTERVENTIONS:  - Assess patient frequently for physical needs  -  Identify cognitive and physical deficits and behaviors that affect risk of falls    -  [place] fall precautions as indicated by assessment   - Educate patient/family on patient safety including physical limitations  - Instruct patient to call for assistance with activity based on assessment  - Modify environment to reduce risk of injury  - Consider OT/PT consult to assist with strengthening/mobility  Outcome: Progressing     Problem: PAIN - ADULT  Goal: Verbalizes/displays adequate comfort level or baseline comfort level  Description: Interventions:  - Encourage patient to monitor pain and request assistance  - Assess pain using appropriate pain scale  - Administer analgesics based on type and severity of pain and evaluate response  - Implement non-pharmacological measures as appropriate and evaluate response  - Consider cultural and social influences on pain and pain management  - Notify physician/advanced practitioner if interventions unsuccessful or patient reports new pain  Outcome: Progressing     Problem: INFECTION - ADULT  Goal: Absence or prevention of progression during hospitalization  Description: INTERVENTIONS:  - Assess and monitor for signs and symptoms of infection  - Monitor lab/diagnostic results  - Monitor all insertion sites, i e  indwelling lines, tubes, and drains  - Monitor endotracheal if appropriate and nasal secretions for changes in amount and color  - [place] appropriate cooling/warming therapies per order  - Administer medications as ordered  - Instruct and encourage patient and family to use good hand hygiene technique  - Identify and instruct in appropriate isolation precautions for identified infection/condition  Outcome: Progressing  Goal: Absence of fever/infection during neutropenic period  Description: INTERVENTIONS:  - Monitor WBC    Outcome: Progressing     Problem: SAFETY ADULT  Goal: Patient will remain free of falls  Description: INTERVENTIONS:  - Assess patient frequently for physical needs  -  Identify cognitive and physical deficits and behaviors that affect risk of falls    -  [place] fall precautions as indicated by assessment   - Educate patient/family on patient safety including physical limitations  - Instruct patient to call for assistance with activity based on assessment  - Modify environment to reduce risk of injury  - Consider OT/PT consult to assist with strengthening/mobility  Outcome: Progressing  Goal: Maintain or return to baseline ADL function  Description: INTERVENTIONS:  -  Assess patient's ability to carry out ADLs; assess patient's baseline for ADL function and identify physical deficits which impact ability to perform ADLs (bathing, care of mouth/teeth, toileting, grooming, dressing, etc )  - Assess/evaluate cause of self-care deficits   - Assess range of motion  - Assess patient's mobility; develop plan if impaired  - Assess patient's need for assistive devices and provide as appropriate  - Encourage maximum independence but intervene and supervise when necessary  - Involve family in performance of ADLs  - Assess for home care needs following discharge   - Consider OT consult to assist with ADL evaluation and planning for discharge  - Provide patient education as appropriate  Outcome: Progressing  Goal: Maintain or return mobility status to optimal level  Description: INTERVENTIONS:  - Assess patient's baseline mobility status (ambulation, transfers, stairs, etc )    - Identify cognitive and physical deficits and behaviors that affect mobility  - Identify mobility aids required to assist with transfers and/or ambulation (gait belt, sit-to-stand, lift, walker, cane, etc )  - [place] fall precautions as indicated by assessment  - Record patient progress and toleration of activity level on Mobility SBAR; progress patient to next Phase/Stage  - Instruct patient to call for assistance with activity based on assessment  - Consider rehabilitation consult to assist with strengthening/weightbearing, etc   Outcome: Progressing     Problem: Prexisting or High Potential for Compromised Skin Integrity  Goal: Skin integrity is maintained or improved  Description: INTERVENTIONS:  - Identify patients at risk for skin breakdown  - Assess and monitor skin integrity  - Assess and monitor nutrition and hydration status  - Monitor labs   - Assess for incontinence   - Turn and reposition patient  - Assist with mobility/ambulation  - Relieve pressure over bony prominences  - Avoid friction and shearing  - Provide appropriate hygiene as needed including keeping skin clean and dry  - Evaluate need for skin moisturizer/barrier cream  - Collaborate with interdisciplinary team   - Patient/family teaching  - Consider wound care consult   Outcome: Progressing     Problem: Nutrition/Hydration-ADULT  Goal: Nutrient/Hydration intake appropriate for improving, restoring or maintaining nutritional needs  Description: Monitor and assess patient's nutrition/hydration status for malnutrition  Collaborate with interdisciplinary team and initiate plan and interventions as ordered  Monitor patient's weight and dietary intake as ordered or per policy  Utilize nutrition screening tool and intervene as necessary  Determine patient's food preferences and provide high-protein, high-caloric foods as appropriate       INTERVENTIONS:  - Monitor oral intake, urinary output, labs, and treatment plans  - Assess nutrition and hydration status and recommend course of action  - Evaluate amount of meals eaten  - Assist patient with eating if necessary   - Allow adequate time for meals  - Recommend/ encourage appropriate diets, oral nutritional supplements, and vitamin/mineral supplements  - Order, calculate, and assess calorie counts as needed  - Recommend, monitor, and adjust tube feedings and TPN/PPN based on assessed needs  - Assess need for intravenous fluids  - Provide specific nutrition/hydration education as appropriate  - Include patient/family/caregiver in decisions related to nutrition  Outcome: Progressing

## 2020-09-04 NOTE — PROGRESS NOTES
Vancomycin IV Pharmacy-to-Dose Consultation    Brunetta Burkitt is a 60 y o  male who is currently receiving Vancomycin IV with management by the Pharmacy Consult service  Assessment/Plan:  The patient was reviewed  Renal function is stable and no signs or symptoms of nephrotoxicity and/or infusion reactions were documented in the chart  Based on todays assessment, continue current vancomycin (day # 9) dosing of vancomycin 2000 mg IV q12h, with a plan for trough to be drawn on 9/7/20 at 0630  We will continue to follow the patients culture results and clinical progress daily      Julianne Bateman, Pharmacist

## 2020-09-04 NOTE — OCCUPATIONAL THERAPY NOTE
Occupational Therapy Re-evaluation Note        Patient Name: Jose L Gould  UNHRUGISELLE Date: 9/4/2020 09/04/20 1055   Note Type   Note type Re-eval   Restrictions/Precautions   Weight Bearing Precautions Per Order Yes   RLE Weight Bearing Per Order NWB  (per podiatry )   Braces or Orthoses Other (Comment)  (none per pt)   Other Precautions Impulsive;Multiple lines; Fall Risk;Pain;WBS; Bed Alarm   Pain Assessment   Pain Assessment Tool 0-10   Pain Score 7   Pain Location/Orientation Orientation: Right;Location: Foot   Pain Onset/Description Onset: Ongoing   Patient's Stated Pain Goal No pain   Hospital Pain Intervention(s) Repositioned; Ambulation/increased activity   Home Living   Additional Comments Please refer to initial OT evaluation on 8/31 for home setup and PLOF details   Prior Function   Comments Please refer to initial OT evaluation on 8/31 for home setup and PLOF details   Lifestyle   Autonomy Per pt report, he lives with his significant other in a one-story home with 3 DANIEL with no railings  Patient reports that at baseline, he is independent in ADLs and requires assistance with IADLs from his significant other, including meal preparation and medication management  Patient reports that he has only recently been ambulating short distances in his home and does not use DME at baseline for functional mobility     Reciprocal Relationships Girlfriend is planning on taking vacation time   Service to Others Currently on disability, typically  at [facility] Patient reports that at time of evaluation he does not have any leisure interests or activities that he engages in   [address] [place] [address],[address] 5  [address] [facility] 5  [facility]  3  Moderate Assistance   Functional Assistance 4  Minimal Assistance   Additional Comments ADL assist levels based on pt's functional performance during OT re-evaluation   Bed Mobility   Supine to Sit 5  Supervision   Additional items Assist x 1; Increased time required;Verbal cues; Bedrails; Impulsive   Sit to Supine 5  Supervision   Additional items Assist x 1; Increased time required; Bedrails;Verbal cues   Additional Comments Pt received lying supine in bed upon OT arrival; at end of session: pt returned lying supine in bed w/ all needs within reach and pt w/ verbal understanding to utilize call bell prior to attempting to stand up   Transfers   Sit to Stand 4  Minimal assistance  (w/ axillary crutches)   Additional items Assist x 2;Bedrails; Impulsive; Increased time required;Verbal cues  (Pt with difficulty maintaining NWB RLE despite education)   Stand to Sit 4  Minimal assistance  (w/ axillary crutches)   Additional items Assist x 2; Increased time required;Verbal cues; Bedrails  (Pt with difficulty maintaining NWB RLE despite education)   Sit pivot 5  Supervision   Additional items Assist x 1;Bedrails; Increased time required;Verbal cues  (Sit pivot from bed to/from manual wheelchair)   Additional Comments Patient was educated on NWB status for RLE  Patient continues to demonstrate difficulty maintaining WB status and demonstrated impaired safety awareness and insight into deficits  Patient with poor judgement  Balance   Static Sitting Good   Dynamic Sitting Fair +   Static Standing Fair -   Activity Tolerance   Activity Tolerance Other (Comment); Treatment limited secondary to agitation  (Difficulty maintaining NWB restrictions)   Medical Staff Made Aware PT Germania Wilson; Dr Sailaja Chow confirmed NWB RLE via Omari Payton   Nurse Made Aware BONI Fisher confirmed pt appropriate for therapy and made aware of session outcomes/recommended transfer techniques   RUE Assessment   RUE Assessment WFL  (Strength and AROM WFL based on functional assessment)   LUE Assessment   LUE Assessment WFL  (Strength and AROM WFL based on functional assessment)   Hand Function   Gross Motor Coordination Functional   Fine Motor Coordination Functional   Sensation   Light Touch   (Patient reports neuropathy in bilateral feet )   Vision-Basic Assessment   Current Vision Wears glasses all the time   Cognition   Overall Cognitive Status [facility]   Arousal/Participation Alert; Responsive   Attention Within functional limits   Orientation Level Oriented X4   Memory Within functional limits   Following Commands Follows one step commands with increased time or repetition   Comments Pt agreeable to OT re-evaluation with encouragement  Patient with noted impairments in safety awareness and difficulty maintaining NWB status  Assessment   Limitation Decreased ADL status; Decreased Safe judgement during ADL;Decreased endurance   Prognosis Fair   Assessment Patient is a 60 y o  male seen for OT re-evaluation s/p admit to [address] [facility] on 8/28/2020 w/Osteomyelitis [facility])  Commorbidities affecting patient's functional performance at time of assessment include: MRSA bacteremia, amputation of right great toe, PAD, hyperthyroidism, type 2 diabetes mellitus with diabetic polyneuropathy with long-term current use of insulin, and essential HTN  Orders placed for OT evaluation and treatment NWB and RLE per podiatry  Performed at least two patient identifiers during session including name and wristband  Please refer to initial OT evaluation on 8/31/20 for details on home setup and PLOF  Personal factors affecting patient at time of initial evaluation include: steps to enter, non compliance, difficulty performing ADLs, difficulty performing IADLs and health management   Upon re-evaluation, patient requires supervision and set up assist for UB ADLs, minimal  assist for LB ADLs, transfers with minimal  assist x2; trailed with use of axillary crutches and transitioned to sit pivot transfer from bed to/from wheelchair  Patient continues to have difficulty maintaining NWB status during transfers, requiring max VCs  Patient continues to demonstrate decreased safety awareness, impulsivity, and non-compliance  Patient's significant other was present during session and was also educated on safety and DME/AD  Patient is alert and oriented x 4, presents with impaired judgement, inability to make safe decisions  Occupational performance is affected by the following deficits: impulsive behavior, dynamic sit/ stand balance deficit with poor standing tolerance time for self care and functional mobility, decreased activity tolerance, decreased safety awareness, decreased coping skills and postural control and postural alignment deficit, requiring external assistance to complete transitional movements  Patient to benefit from continued Occupational Therapy treatment while in the hospital to address deficits as defined above and maximize level of functional independence with ADLs and functional mobility  Occupational Performance areas to address include: grooming , bathing/ shower, dressing, toilet hygiene, transfer to all surfaces, functional mobility, community mobility, health maintenance, IADLs: safety procedures, Leisure Participation and Social participation  From OT standpoint, recommendation at time of d/c would be post-acute rehabilitation services  Goals   Patient Goals "to get out of here"   Plan   Treatment Interventions ADL retraining;Functional transfer training;UE strengthening/ROM; Endurance training;Patient/family training; Compensatory technique education; Energy conservation; Activityengagement   Goal Expiration Date 09/18/20   OT Frequency 3-5x/wk   Recommendation   OT Discharge Recommendation Post-Acute Rehabilitation Services   Barthel Index   Feeding 10   Bathing 0   Grooming Score 5   Dressing Score 10   Bladder Score 10   Bowels Score 10   Toilet Use Score 5   Transfers (Bed/Chair) Score 5 Mobility (Level Surface) Score 0   Stairs Score 0   [place] Index Score 55   Modified [place] Scale   Modified [place] Scale 4     Occupational Therapy Goals to be completed in 7-14 Days:    1- Patient will verbalize and demonstrate good body mechanics and joint protection techniques during ADLs/ IADLs with no verbal cues      2- Patient will increase OOB/ sitting tolerance to 4-6 hours per day for increased participation in self care and leisure tasks with no s/s of exertion      3- Patient/ Family will demonstrate competency with UE Home Exercise Program       4- Patient will engage in depression screen/ leisure interest check list to identify s/s of depression and facilitate patients involvement in play/ leisure tasks       5- Patient will identify 3 positive coping strategies to assist with stressregulation and management      6- Patient  will engage in activity configuration activity with good participation and mod I to increase time management skills and improve participation in a structured routine to improve overall quality of life      7- Patient will complete LB ADLs at Mod I level, with use of compensatory techniques as indicated       8- Patient will complete UB ADLs at Mod I level      9- Patient will perform functional transfers at Mod I level with good safety awareness, maintenance of WB status, and least restrictive % of the time      Lorena Velásquez OTR/VINI

## 2020-09-04 NOTE — PROGRESS NOTES
Surgery was successful TMA and has resolved the osteomyelitis  Patient's bandages can remain intact  He is okay to be transferred to a rehab facility  Nonweightbearing right foot with crutches  Will follow as an outpatient

## 2020-09-05 LAB
ANION GAP SERPL CALCULATED.3IONS-SCNC: 10 MMOL/L (ref 4–13)
BACTERIA BLD CULT: NORMAL
BUN SERPL-MCNC: 34 MG/DL (ref 5–25)
CALCIUM SERPL-MCNC: 8.9 MG/DL (ref 8.3–10.1)
CHLORIDE SERPL-SCNC: 100 MMOL/L (ref 100–108)
CO2 SERPL-SCNC: 24 MMOL/L (ref 21–32)
CREAT SERPL-MCNC: 2.28 MG/DL (ref 0.6–1.3)
ERYTHROCYTE [DISTWIDTH] IN BLOOD BY AUTOMATED COUNT: 12.1 % (ref 11.6–15.1)
GFR SERPL CREATININE-BSD FRML MDRD: 31 ML/MIN/1.73SQ M
GLUCOSE SERPL-MCNC: 155 MG/DL (ref 65–140)
GLUCOSE SERPL-MCNC: 172 MG/DL (ref 65–140)
GLUCOSE SERPL-MCNC: 181 MG/DL (ref 65–140)
GLUCOSE SERPL-MCNC: 187 MG/DL (ref 65–140)
HCT VFR BLD AUTO: 32 % (ref 36.5–49.3)
HGB BLD-MCNC: 10.8 G/DL (ref 12–17)
MCH RBC QN AUTO: 30.4 PG (ref 26.8–34.3)
MCHC RBC AUTO-ENTMCNC: 33.8 G/DL (ref 31.4–37.4)
MCV RBC AUTO: 90 FL (ref 82–98)
PLATELET # BLD AUTO: 341 THOUSANDS/UL (ref 149–390)
PMV BLD AUTO: 10.4 FL (ref 8.9–12.7)
POTASSIUM SERPL-SCNC: 3.8 MMOL/L (ref 3.5–5.3)
RBC # BLD AUTO: 3.55 MILLION/UL (ref 3.88–5.62)
SODIUM SERPL-SCNC: 134 MMOL/L (ref 136–145)
VANCOMYCIN SERPL-MCNC: 46.9 UG/ML
WBC # BLD AUTO: 15.73 THOUSAND/UL (ref 4.31–10.16)

## 2020-09-05 PROCEDURE — 85027 COMPLETE CBC AUTOMATED: CPT | Performed by: INTERNAL MEDICINE

## 2020-09-05 PROCEDURE — 82948 REAGENT STRIP/BLOOD GLUCOSE: CPT

## 2020-09-05 PROCEDURE — 80048 BASIC METABOLIC PNL TOTAL CA: CPT | Performed by: INTERNAL MEDICINE

## 2020-09-05 PROCEDURE — 99232 SBSQ HOSP IP/OBS MODERATE 35: CPT | Performed by: INTERNAL MEDICINE

## 2020-09-05 PROCEDURE — 80202 ASSAY OF VANCOMYCIN: CPT | Performed by: INTERNAL MEDICINE

## 2020-09-05 RX ORDER — DOCUSATE SODIUM 100 MG/1
100 CAPSULE, LIQUID FILLED ORAL 2 TIMES DAILY
Status: DISCONTINUED | OUTPATIENT
Start: 2020-09-05 | End: 2020-09-21 | Stop reason: HOSPADM

## 2020-09-05 RX ORDER — VANCOMYCIN HYDROCHLORIDE 1 G/200ML
10 INJECTION, SOLUTION INTRAVENOUS DAILY PRN
Status: DISCONTINUED | OUTPATIENT
Start: 2020-09-06 | End: 2020-09-08

## 2020-09-05 RX ADMIN — LOSARTAN POTASSIUM: 50 TABLET, FILM COATED ORAL at 08:11

## 2020-09-05 RX ADMIN — METHIMAZOLE 5 MG: 5 TABLET ORAL at 08:12

## 2020-09-05 RX ADMIN — INSULIN LISPRO 1 UNITS: 100 INJECTION, SOLUTION INTRAVENOUS; SUBCUTANEOUS at 18:39

## 2020-09-05 RX ADMIN — INSULIN LISPRO 15 UNITS: 100 INJECTION, SOLUTION INTRAVENOUS; SUBCUTANEOUS at 18:39

## 2020-09-05 RX ADMIN — ASPIRIN 81 MG 81 MG: 81 TABLET ORAL at 08:11

## 2020-09-05 RX ADMIN — INSULIN LISPRO 15 UNITS: 100 INJECTION, SOLUTION INTRAVENOUS; SUBCUTANEOUS at 13:33

## 2020-09-05 RX ADMIN — INSULIN GLARGINE 20 UNITS: 100 INJECTION, SOLUTION SUBCUTANEOUS at 22:04

## 2020-09-05 RX ADMIN — CLOPIDOGREL BISULFATE 75 MG: 75 TABLET ORAL at 08:12

## 2020-09-05 RX ADMIN — INSULIN LISPRO 1 UNITS: 100 INJECTION, SOLUTION INTRAVENOUS; SUBCUTANEOUS at 22:04

## 2020-09-05 RX ADMIN — ATORVASTATIN CALCIUM 40 MG: 40 TABLET, FILM COATED ORAL at 22:04

## 2020-09-05 RX ADMIN — DOCUSATE SODIUM 100 MG: 100 CAPSULE, LIQUID FILLED ORAL at 10:42

## 2020-09-05 RX ADMIN — SODIUM CHLORIDE 500 ML: 0.9 INJECTION, SOLUTION INTRAVENOUS at 10:32

## 2020-09-05 RX ADMIN — VERAPAMIL HYDROCHLORIDE 240 MG: 120 TABLET, FILM COATED, EXTENDED RELEASE ORAL at 08:12

## 2020-09-05 RX ADMIN — PANTOPRAZOLE SODIUM 20 MG: 20 TABLET, DELAYED RELEASE ORAL at 06:10

## 2020-09-05 NOTE — PLAN OF CARE
Problem: Potential for Falls  Goal: Patient will remain free of falls  Description: INTERVENTIONS:  - Assess patient frequently for physical needs  -  Identify cognitive and physical deficits and behaviors that affect risk of falls    -  [place] fall precautions as indicated by assessment   - Educate patient/family on patient safety including physical limitations  - Instruct patient to call for assistance with activity based on assessment  - Modify environment to reduce risk of injury  - Consider OT/PT consult to assist with strengthening/mobility  Outcome: Progressing     Problem: PAIN - ADULT  Goal: Verbalizes/displays adequate comfort level or baseline comfort level  Description: Interventions:  - Encourage patient to monitor pain and request assistance  - Assess pain using appropriate pain scale  - Administer analgesics based on type and severity of pain and evaluate response  - Implement non-pharmacological measures as appropriate and evaluate response  - Consider cultural and social influences on pain and pain management  - Notify physician/advanced practitioner if interventions unsuccessful or patient reports new pain  Outcome: Progressing     Problem: INFECTION - ADULT  Goal: Absence or prevention of progression during hospitalization  Description: INTERVENTIONS:  - Assess and monitor for signs and symptoms of infection  - Monitor lab/diagnostic results  - Monitor all insertion sites, i e  indwelling lines, tubes, and drains  - Monitor endotracheal if appropriate and nasal secretions for changes in amount and color  - [place] appropriate cooling/warming therapies per order  - Administer medications as ordered  - Instruct and encourage patient and family to use good hand hygiene technique  - Identify and instruct in appropriate isolation precautions for identified infection/condition  Outcome: Progressing  Goal: Absence of fever/infection during neutropenic period  Description: INTERVENTIONS:  - Monitor WBC    Outcome: Progressing     Problem: SAFETY ADULT  Goal: Patient will remain free of falls  Description: INTERVENTIONS:  - Assess patient frequently for physical needs  -  Identify cognitive and physical deficits and behaviors that affect risk of falls    -  [place] fall precautions as indicated by assessment   - Educate patient/family on patient safety including physical limitations  - Instruct patient to call for assistance with activity based on assessment  - Modify environment to reduce risk of injury  - Consider OT/PT consult to assist with strengthening/mobility  Outcome: Progressing  Goal: Maintain or return to baseline ADL function  Description: INTERVENTIONS:  -  Assess patient's ability to carry out ADLs; assess patient's baseline for ADL function and identify physical deficits which impact ability to perform ADLs (bathing, care of mouth/teeth, toileting, grooming, dressing, etc )  - Assess/evaluate cause of self-care deficits   - Assess range of motion  - Assess patient's mobility; develop plan if impaired  - Assess patient's need for assistive devices and provide as appropriate  - Encourage maximum independence but intervene and supervise when necessary  - Involve family in performance of ADLs  - Assess for home care needs following discharge   - Consider OT consult to assist with ADL evaluation and planning for discharge  - Provide patient education as appropriate  Outcome: Progressing  Goal: Maintain or return mobility status to optimal level  Description: INTERVENTIONS:  - Assess patient's baseline mobility status (ambulation, transfers, stairs, etc )    - Identify cognitive and physical deficits and behaviors that affect mobility  - Identify mobility aids required to assist with transfers and/or ambulation (gait belt, sit-to-stand, lift, walker, cane, etc )  - [place] fall precautions as indicated by assessment  - Record patient progress and toleration of activity level on Mobility SBAR; progress patient to next Phase/Stage  - Instruct patient to call for assistance with activity based on assessment  - Consider rehabilitation consult to assist with strengthening/weightbearing, etc   Outcome: Progressing     Problem: DISCHARGE PLANNING  Goal: Discharge to home or other facility with appropriate resources  Description: INTERVENTIONS:  - Identify barriers to discharge w/patient and caregiver  - Arrange for needed discharge resources and transportation as appropriate  - Identify discharge learning needs (meds, wound care, etc )  - Arrange for interpretive services to assist at discharge as needed  - Refer to Case Management Department for coordinating discharge planning if the patient needs post-hospital services based on physician/advanced practitioner order or complex needs related to functional status, cognitive ability, or social support system  Outcome: Progressing     Problem: Knowledge Deficit  Goal: Patient/family/caregiver demonstrates understanding of disease process, treatment plan, medications, and discharge instructions  Description: Complete learning assessment and assess knowledge base  Interventions:  - Provide teaching at level of understanding  - Provide teaching via preferred learning methods  Outcome: Progressing     Problem: Nutrition/Hydration-ADULT  Goal: Nutrient/Hydration intake appropriate for improving, restoring or maintaining nutritional needs  Description: Monitor and assess patient's nutrition/hydration status for malnutrition  Collaborate with interdisciplinary team and initiate plan and interventions as ordered  Monitor patient's weight and dietary intake as ordered or per policy  Utilize nutrition screening tool and intervene as necessary  Determine patient's food preferences and provide high-protein, high-caloric foods as appropriate       INTERVENTIONS:  - Monitor oral intake, urinary output, labs, and treatment plans  - Assess nutrition and hydration status and recommend course of action  - Evaluate amount of meals eaten  - Assist patient with eating if necessary   - Allow adequate time for meals  - Recommend/ encourage appropriate diets, oral nutritional supplements, and vitamin/mineral supplements  - Order, calculate, and assess calorie counts as needed  - Recommend, monitor, and adjust tube feedings and TPN/PPN based on assessed needs  - Assess need for intravenous fluids  - Provide specific nutrition/hydration education as appropriate  - Include patient/family/caregiver in decisions related to nutrition  Outcome: Progressing     Problem: Prexisting or High Potential for Compromised Skin Integrity  Goal: Skin integrity is maintained or improved  Description: INTERVENTIONS:  - Identify patients at risk for skin breakdown  - Assess and monitor skin integrity  - Assess and monitor nutrition and hydration status  - Monitor labs   - Assess for incontinence   - Turn and reposition patient  - Assist with mobility/ambulation  - Relieve pressure over bony prominences  - Avoid friction and shearing  - Provide appropriate hygiene as needed including keeping skin clean and dry  - Evaluate need for skin moisturizer/barrier cream  - Collaborate with interdisciplinary team   - Patient/family teaching  - Consider wound care consult   Outcome: Progressing

## 2020-09-05 NOTE — PLAN OF CARE
Problem: Potential for Falls  Goal: Patient will remain free of falls  Description: INTERVENTIONS:  - Assess patient frequently for physical needs  -  Identify cognitive and physical deficits and behaviors that affect risk of falls    -  [place] fall precautions as indicated by assessment   - Educate patient/family on patient safety including physical limitations  - Instruct patient to call for assistance with activity based on assessment  - Modify environment to reduce risk of injury  - Consider OT/PT consult to assist with strengthening/mobility  Outcome: Progressing

## 2020-09-05 NOTE — PROGRESS NOTES
Vancomycin Assessment    Jose L Gould is a 60 y o  male who is currently receiving vancomycin vancomycin 2000 mg IV q12h for MRSA suspected bone/joint   Relevant clinical data and objective history reviewed:  Creatinine   Date Value Ref Range Status   09/05/2020 2 28 (H) 0 60 - 1 30 mg/dL Final     Comment:     Standardized to IDMS reference method   09/01/2020 0 82 0 60 - 1 30 mg/dL Final     Comment:     Standardized to IDMS reference method   08/31/2020 0 82 0 60 - 1 30 mg/dL Final     Comment:     Standardized to IDMS reference method     Vancomycin Rm   Date Value Ref Range Status   09/05/2020 46 9 ug/mL Final     /85 (BP Location: Right arm)   Pulse 80   Temp 98 4 °F (36 9 °C) (Oral)   Resp 19   Ht 6' 4" (1 93 m)   Wt 115 kg (253 lb 12 oz)   SpO2 93%   BMI 30 89 kg/m²   I/O last 3 completed shifts: In: 320 [P O :320]  Out: 550 [Urine:550]  Lab Results   Component Value Date/Time    BUN 34 (H) 09/05/2020 06:11 AM    WBC 15 73 (H) 09/05/2020 06:11 AM    HGB 10 8 (L) 09/05/2020 06:11 AM    HCT 32 0 (L) 09/05/2020 06:11 AM    MCV 90 09/05/2020 06:11 AM     09/05/2020 06:11 AM     Temp Readings from Last 3 Encounters:   09/05/20 98 4 °F (36 9 °C) (Oral)   08/20/20 (!) 97 3 °F (36 3 °C) (Tympanic)   08/12/20 98 5 °F (36 9 °C)     Vancomycin Days of Therapy: 10    Assessment/Plan  The patient is currently on vancomycin utilizing scheduled dosing  Baseline risks associated with therapy include: pre-existing renal impairment  The patient is receiving vancomycin 2000 mg IV q12h based on a goal of 15-20 (appropriate for most indications) ; therefore, after clinical evaluation will be changed to vancomycin 1000 mg IV daily PRN when vanco <20 due to rapid decline in renal function  Pharmacy will continue to follow closely for s/sx of nephrotoxicity, infusion reactions, and appropriateness of therapy  BMP and CBC will be ordered per protocol  Plan for random trough tomorrow 9/6/20   Pharmacy will continue to follow the patients culture results and clinical progress daily      Simba Vasquez, Pharmacist

## 2020-09-05 NOTE — PROGRESS NOTES
[facility] Progress Note  Patient: Nadege Gray 60 y o  male   MRN: [id number]  PCP: Mary Go MD  Unit/Bed#: -01 Encounter: [id number]  Date Of Visit: 09/05/20    Assessment:    Principal Problem:    Osteomyelitis ([address] )  Active Problems:    Hypertension, essential    Gastroesophageal reflux disease without esophagitis    Hyperlipidemia    Type 2 diabetes mellitus with diabetic polyneuropathy, with long-term current use of insulin ([facility])    Hyperthyroidism    PAD (peripheral artery disease) ([facility])    Amputation of right great toe ([facility])    MRSA bacteremia      Plan:  MRSA bacteremia secondary to osteomyelitis  Continue IV vancomycin for a total of 4 weeks  Follow-up on final cultures before placing PICC line  Patient clinically improving  ID recommendations appreciated    Osteomyelitis status post surgery postop day 2  Clinically improving  Podiatry recommendations appreciated  Continue and complete the antibiotic course a total of 4 weeks    Peripheral vascular disease  Vascular surgery input noted  Continue aspirin statin and Plavix    Type 2 diabetes  Continue current management  Monitor sugars      VTE Pharmacologic Prophylaxis:   Pharmacologic: Heparin  Mechanical VTE Prophylaxis in Place: Yes    Patient Centered Rounds: I have performed bedside rounds with nursing staff today  Discussions with Specialists or Other Care Team Provider:   Education and Discussions with Family / Patient:  Patient    Time Spent for Care: 30 minutes  More than 50% of total time spent on counseling and coordination of care as described above  Current Length of Stay: 8 day(s)    Current Patient Status: Inpatient   Certification Statement: The patient will continue to require additional inpatient hospital stay due to Acute kidney injury    Discharge Plan:  Continue hospitalization    Code Status: Level 1 - Full Code      Subjective:   Patient seen and examined  No acute complaints at this time  Overall feels better  Denies any complaints at this time no nausea vomiting or abdominal pain  Patient was refusing IV fluids, recommended to continue due to acute kidney injury    Objective:     Vitals:   Temp (24hrs), Av 4 °F (36 9 °C), Min:98 4 °F (36 9 °C), Max:98 4 °F (36 9 °C)    Temp:  [98 4 °F (36 9 °C)] 98 4 °F (36 9 °C)  HR:  [80] 80  Resp:  [19] 19  BP: (151)/(85) 151/85  SpO2:  [93 %] 93 %  Body mass index is 30 89 kg/m²  Input and Output Summary (last 24 hours): Intake/Output Summary (Last 24 hours) at 2020 1428  Last data filed at 2020 1301  Gross per 24 hour   Intake 630 ml   Output 450 ml   Net 180 ml       Physical Exam:     Alert and oriented x3  Mucous membranes are moist  Lungs are clear to auscultation external sounds are regular S1-S2  Abdomen soft nontender  Extremities right foot wrapped in surgical bandage    Additional Data:     Labs:    Results from last 7 days   Lab Units 20  0611 20  0657   WBC Thousand/uL 15 73* 9 07   HEMOGLOBIN g/dL 10 8* 11 1*   HEMATOCRIT % 32 0* 33 3*   PLATELETS Thousands/uL 341 403*   NEUTROS PCT %  --  55   LYMPHS PCT %  --  26   MONOS PCT %  --  12   EOS PCT %  --  5     Results from last 7 days   Lab Units 20  0611   POTASSIUM mmol/L 3 8   CHLORIDE mmol/L 100   CO2 mmol/L 24   BUN mg/dL 34*   CREATININE mg/dL 2 28*   CALCIUM mg/dL 8 9           * I Have Reviewed All Lab Data Listed Above  * Additional Pertinent Lab Tests Reviewed: Pradeep 66 Admission Reviewed    Imaging:    Imaging Reports Reviewed Today Include:   Imaging Personally Reviewed by Myself Includes:    Recent Cultures (last 7 days):     Results from last 7 days   Lab Units 20  0703 20  0657 20  0615 20  1646   BLOOD CULTURE  No Growth at 72 hrs  No Growth at 72 hrs  No Growth After 5 Days    --    GRAM STAIN RESULT   --   --   --  Rare Polys*  2+ Gram negative rods*  2+ Gram positive cocci in pairs*   WOUND CULTURE   --   --   --  2+ Growth of Morganella morganii*  2+ Growth of Methicillin Resistant Staphylococcus aureus*  1+ Growth of Enterobacter cloacae complex*  1+ Growth of Enterococcus faecalis*       Last 24 Hours Medication List:   Current Facility-Administered Medications   Medication Dose Route Frequency Provider Last Rate    acetaminophen  975 mg Oral Q8H [address] Lalo Thornton MD      aluminum-magnesium hydroxide-simethicone  30 mL Oral Q6H PRN Pepper Curia, DPM      aspirin  81 mg Oral Daily Pepper Curia, [facility]      atorvastatin  40 mg Oral Daily With [facility], DPM      clopidogrel  75 mg Oral Daily Pepper Curia, [facility]      docusate sodium  100 mg Oral BID Avinash Cisneros MD      insulin glargine  20 Units Subcutaneous HS Pepper Curia, DPM      insulin lispro  1-5 Units Subcutaneous 4x Daily [facility] Pepper Curia, DPM      insulin lispro  15 Units Subcutaneous TID With Meals Pepper Curia, DPM      LORazepam  0 5 mg Oral BID PRN Pepper Curia, DPM      methimazole  5 mg Oral Daily Pepper Curia, [facility]      ondansetron  4 mg Intravenous Q6H PRN Pepper Curia, DPM      oxyCODONE  10 mg Oral Q6H PRN Avinash Cisneros MD      oxyCODONE  5 mg Oral Q6H PRN Avinash Cisneros MD      pantoprazole  20 mg Oral Early Morning Pepper Curia, DPM      polyethylene glycol  17 g Oral Daily PRN Pepper Curia, DPM      sodium chloride  100 mL/hr Intravenous Continuous Pepper Curia,  mL/hr (09/05/20 1227)    [START ON 9/6/2020] vancomycin  10 mg/kg (Adjusted) Intravenous Daily PRN Avinash Cisneros MD      verapamil  240 mg Oral Daily Pepper Curia, DPM      zolpidem  10 mg Oral HS PRN Pepper Curia, DPM          Today, Patient Was Seen By: Avinash Cisneros MD    ** Please Note: Dragon 360 Dictation voice to text software may have been used in the creation of this document   **

## 2020-09-06 PROBLEM — N17.0 ACUTE KIDNEY INJURY (AKI) WITH ACUTE TUBULAR NECROSIS (ATN) (HCC): Status: ACTIVE | Noted: 2020-09-06

## 2020-09-06 LAB
ANION GAP SERPL CALCULATED.3IONS-SCNC: 10 MMOL/L (ref 4–13)
BACTERIA BLD CULT: NORMAL
BACTERIA BLD CULT: NORMAL
BUN SERPL-MCNC: 34 MG/DL (ref 5–25)
CALCIUM SERPL-MCNC: 9 MG/DL (ref 8.3–10.1)
CHLORIDE SERPL-SCNC: 101 MMOL/L (ref 100–108)
CO2 SERPL-SCNC: 24 MMOL/L (ref 21–32)
CREAT SERPL-MCNC: 2.32 MG/DL (ref 0.6–1.3)
GFR SERPL CREATININE-BSD FRML MDRD: 30 ML/MIN/1.73SQ M
GLUCOSE SERPL-MCNC: 129 MG/DL (ref 65–140)
GLUCOSE SERPL-MCNC: 132 MG/DL (ref 65–140)
GLUCOSE SERPL-MCNC: 181 MG/DL (ref 65–140)
GLUCOSE SERPL-MCNC: 239 MG/DL (ref 65–140)
GLUCOSE SERPL-MCNC: 250 MG/DL (ref 65–140)
POTASSIUM SERPL-SCNC: 3.5 MMOL/L (ref 3.5–5.3)
SODIUM SERPL-SCNC: 135 MMOL/L (ref 136–145)
VANCOMYCIN SERPL-MCNC: 29.9 UG/ML

## 2020-09-06 PROCEDURE — 82948 REAGENT STRIP/BLOOD GLUCOSE: CPT

## 2020-09-06 PROCEDURE — 80048 BASIC METABOLIC PNL TOTAL CA: CPT | Performed by: INTERNAL MEDICINE

## 2020-09-06 PROCEDURE — 80202 ASSAY OF VANCOMYCIN: CPT | Performed by: INTERNAL MEDICINE

## 2020-09-06 RX ORDER — TAMSULOSIN HYDROCHLORIDE 0.4 MG/1
0.4 CAPSULE ORAL
Status: DISCONTINUED | OUTPATIENT
Start: 2020-09-06 | End: 2020-09-21 | Stop reason: HOSPADM

## 2020-09-06 RX ADMIN — ATORVASTATIN CALCIUM 40 MG: 40 TABLET, FILM COATED ORAL at 17:49

## 2020-09-06 RX ADMIN — INSULIN LISPRO 15 UNITS: 100 INJECTION, SOLUTION INTRAVENOUS; SUBCUTANEOUS at 12:19

## 2020-09-06 RX ADMIN — SODIUM CHLORIDE 100 ML/HR: 0.9 INJECTION, SOLUTION INTRAVENOUS at 17:54

## 2020-09-06 RX ADMIN — CLOPIDOGREL BISULFATE 75 MG: 75 TABLET ORAL at 10:16

## 2020-09-06 RX ADMIN — METHIMAZOLE 5 MG: 5 TABLET ORAL at 10:16

## 2020-09-06 RX ADMIN — INSULIN LISPRO 1 UNITS: 100 INJECTION, SOLUTION INTRAVENOUS; SUBCUTANEOUS at 17:50

## 2020-09-06 RX ADMIN — ASPIRIN 81 MG 81 MG: 81 TABLET ORAL at 10:15

## 2020-09-06 RX ADMIN — INSULIN GLARGINE 20 UNITS: 100 INJECTION, SOLUTION SUBCUTANEOUS at 21:33

## 2020-09-06 RX ADMIN — INSULIN LISPRO 2 UNITS: 100 INJECTION, SOLUTION INTRAVENOUS; SUBCUTANEOUS at 12:19

## 2020-09-06 RX ADMIN — INSULIN LISPRO 2 UNITS: 100 INJECTION, SOLUTION INTRAVENOUS; SUBCUTANEOUS at 21:34

## 2020-09-06 RX ADMIN — SODIUM CHLORIDE 100 ML/HR: 0.9 INJECTION, SOLUTION INTRAVENOUS at 04:26

## 2020-09-06 RX ADMIN — INSULIN LISPRO 15 UNITS: 100 INJECTION, SOLUTION INTRAVENOUS; SUBCUTANEOUS at 17:50

## 2020-09-06 RX ADMIN — TAMSULOSIN HYDROCHLORIDE 0.4 MG: 0.4 CAPSULE ORAL at 19:24

## 2020-09-06 RX ADMIN — PANTOPRAZOLE SODIUM 20 MG: 20 TABLET, DELAYED RELEASE ORAL at 06:51

## 2020-09-06 RX ADMIN — VERAPAMIL HYDROCHLORIDE 240 MG: 120 TABLET, FILM COATED, EXTENDED RELEASE ORAL at 10:15

## 2020-09-06 RX ADMIN — DOCUSATE SODIUM 100 MG: 100 CAPSULE, LIQUID FILLED ORAL at 10:16

## 2020-09-06 NOTE — ASSESSMENT & PLAN NOTE
Likely secondary to sepsis/surgery  Creatinine 2 32    Baseline appears to be less than 1  Continue IV fluids  Will check bladder scan to see if he has postvoid residual  Monitor creatinine closely

## 2020-09-06 NOTE — ASSESSMENT & PLAN NOTE
2/2 culture from 08/28/2020 growing Staph aureus  Likely source foot wound  Continue IV vancomycin total of 4 weeks  Vascular surgery, Podiatry following  ID recommendations appreciated  Pharmacy consult for vancomycin management

## 2020-09-06 NOTE — ASSESSMENT & PLAN NOTE
Lab Results   Component Value Date    HGBA1C 8 1 (H) 08/28/2020       Recent Labs     09/05/20  2203 09/06/20  0703 09/06/20  1107 09/06/20  1516   POCGLU 155* 132 239* 181*       Blood Sugar Average: Last 72 hrs:  · (P) 662 [id number]     Uncontrolled  Hemoglobin A1c 8 1  Blood sugars well controlled with current regimen of Lantus 35 units q h s   And Humalog 15 with meals    Will decrease the dose of Lantus to 20 units tonight in anticipation of surgery  Continue sliding scale  Continue to monitor sugars  Continue to monitor for hypoglycemia

## 2020-09-06 NOTE — PLAN OF CARE
Problem: Potential for Falls  Goal: Patient will remain free of falls  Description: INTERVENTIONS:  - Assess patient frequently for physical needs  -  Identify cognitive and physical deficits and behaviors that affect risk of falls    -  [place] fall precautions as indicated by assessment   - Educate patient/family on patient safety including physical limitations  - Instruct patient to call for assistance with activity based on assessment  - Modify environment to reduce risk of injury  - Consider OT/PT consult to assist with strengthening/mobility  Outcome: Progressing     Problem: PAIN - ADULT  Goal: Verbalizes/displays adequate comfort level or baseline comfort level  Description: Interventions:  - Encourage patient to monitor pain and request assistance  - Assess pain using appropriate pain scale  - Administer analgesics based on type and severity of pain and evaluate response  - Implement non-pharmacological measures as appropriate and evaluate response  - Consider cultural and social influences on pain and pain management  - Notify physician/advanced practitioner if interventions unsuccessful or patient reports new pain  Outcome: Progressing     Problem: INFECTION - ADULT  Goal: Absence or prevention of progression during hospitalization  Description: INTERVENTIONS:  - Assess and monitor for signs and symptoms of infection  - Monitor lab/diagnostic results  - Monitor all insertion sites, i e  indwelling lines, tubes, and drains  - Monitor endotracheal if appropriate and nasal secretions for changes in amount and color  - [place] appropriate cooling/warming therapies per order  - Administer medications as ordered  - Instruct and encourage patient and family to use good hand hygiene technique  - Identify and instruct in appropriate isolation precautions for identified infection/condition  Outcome: Progressing  Goal: Absence of fever/infection during neutropenic period  Description: INTERVENTIONS:  - Monitor WBC    Outcome: Progressing     Problem: SAFETY ADULT  Goal: Patient will remain free of falls  Description: INTERVENTIONS:  - Assess patient frequently for physical needs  -  Identify cognitive and physical deficits and behaviors that affect risk of falls    -  [place] fall precautions as indicated by assessment   - Educate patient/family on patient safety including physical limitations  - Instruct patient to call for assistance with activity based on assessment  - Modify environment to reduce risk of injury  - Consider OT/PT consult to assist with strengthening/mobility  Outcome: Progressing  Goal: Maintain or return to baseline ADL function  Description: INTERVENTIONS:  -  Assess patient's ability to carry out ADLs; assess patient's baseline for ADL function and identify physical deficits which impact ability to perform ADLs (bathing, care of mouth/teeth, toileting, grooming, dressing, etc )  - Assess/evaluate cause of self-care deficits   - Assess range of motion  - Assess patient's mobility; develop plan if impaired  - Assess patient's need for assistive devices and provide as appropriate  - Encourage maximum independence but intervene and supervise when necessary  - Involve family in performance of ADLs  - Assess for home care needs following discharge   - Consider OT consult to assist with ADL evaluation and planning for discharge  - Provide patient education as appropriate  Outcome: Progressing  Goal: Maintain or return mobility status to optimal level  Description: INTERVENTIONS:  - Assess patient's baseline mobility status (ambulation, transfers, stairs, etc )    - Identify cognitive and physical deficits and behaviors that affect mobility  - Identify mobility aids required to assist with transfers and/or ambulation (gait belt, sit-to-stand, lift, walker, cane, etc )  - [place] fall precautions as indicated by assessment  - Record patient progress and toleration of activity level on Mobility SBAR; progress patient to next Phase/Stage  - Instruct patient to call for assistance with activity based on assessment  - Consider rehabilitation consult to assist with strengthening/weightbearing, etc   Outcome: Progressing     Problem: DISCHARGE PLANNING  Goal: Discharge to home or other facility with appropriate resources  Description: INTERVENTIONS:  - Identify barriers to discharge w/patient and caregiver  - Arrange for needed discharge resources and transportation as appropriate  - Identify discharge learning needs (meds, wound care, etc )  - Arrange for interpretive services to assist at discharge as needed  - Refer to Case Management Department for coordinating discharge planning if the patient needs post-hospital services based on physician/advanced practitioner order or complex needs related to functional status, cognitive ability, or social support system  Outcome: Progressing     Problem: Knowledge Deficit  Goal: Patient/family/caregiver demonstrates understanding of disease process, treatment plan, medications, and discharge instructions  Description: Complete learning assessment and assess knowledge base  Interventions:  - Provide teaching at level of understanding  - Provide teaching via preferred learning methods  Outcome: Progressing     Problem: Nutrition/Hydration-ADULT  Goal: Nutrient/Hydration intake appropriate for improving, restoring or maintaining nutritional needs  Description: Monitor and assess patient's nutrition/hydration status for malnutrition  Collaborate with interdisciplinary team and initiate plan and interventions as ordered  Monitor patient's weight and dietary intake as ordered or per policy  Utilize nutrition screening tool and intervene as necessary  Determine patient's food preferences and provide high-protein, high-caloric foods as appropriate       INTERVENTIONS:  - Monitor oral intake, urinary output, labs, and treatment plans  - Assess nutrition and hydration status and recommend course of action  - Evaluate amount of meals eaten  - Assist patient with eating if necessary   - Allow adequate time for meals  - Recommend/ encourage appropriate diets, oral nutritional supplements, and vitamin/mineral supplements  - Order, calculate, and assess calorie counts as needed  - Recommend, monitor, and adjust tube feedings and TPN/PPN based on assessed needs  - Assess need for intravenous fluids  - Provide specific nutrition/hydration education as appropriate  - Include patient/family/caregiver in decisions related to nutrition  Outcome: Progressing     Problem: Prexisting or High Potential for Compromised Skin Integrity  Goal: Skin integrity is maintained or improved  Description: INTERVENTIONS:  - Identify patients at risk for skin breakdown  - Assess and monitor skin integrity  - Assess and monitor nutrition and hydration status  - Monitor labs   - Assess for incontinence   - Turn and reposition patient  - Assist with mobility/ambulation  - Relieve pressure over bony prominences  - Avoid friction and shearing  - Provide appropriate hygiene as needed including keeping skin clean and dry  - Evaluate need for skin moisturizer/barrier cream  - Collaborate with interdisciplinary team   - Patient/family teaching  - Consider wound care consult   Outcome: Progressing

## 2020-09-06 NOTE — PLAN OF CARE
Problem: Potential for Falls  Goal: Patient will remain free of falls  Description: INTERVENTIONS:  - Assess patient frequently for physical needs  -  Identify cognitive and physical deficits and behaviors that affect risk of falls    -  [place] fall precautions as indicated by assessment   - Educate patient/family on patient safety including physical limitations  - Instruct patient to call for assistance with activity based on assessment  - Modify environment to reduce risk of injury  - Consider OT/PT consult to assist with strengthening/mobility  Outcome: Progressing     Problem: PAIN - ADULT  Goal: Verbalizes/displays adequate comfort level or baseline comfort level  Description: Interventions:  - Encourage patient to monitor pain and request assistance  - Assess pain using appropriate pain scale  - Administer analgesics based on type and severity of pain and evaluate response  - Implement non-pharmacological measures as appropriate and evaluate response  - Consider cultural and social influences on pain and pain management  - Notify physician/advanced practitioner if interventions unsuccessful or patient reports new pain  Outcome: Progressing     Problem: INFECTION - ADULT  Goal: Absence or prevention of progression during hospitalization  Description: INTERVENTIONS:  - Assess and monitor for signs and symptoms of infection  - Monitor lab/diagnostic results  - Monitor all insertion sites, i e  indwelling lines, tubes, and drains  - Monitor endotracheal if appropriate and nasal secretions for changes in amount and color  - [place] appropriate cooling/warming therapies per order  - Administer medications as ordered  - Instruct and encourage patient and family to use good hand hygiene technique  - Identify and instruct in appropriate isolation precautions for identified infection/condition  Outcome: Progressing  Goal: Absence of fever/infection during neutropenic period  Description: INTERVENTIONS:  - Monitor WBC    Outcome: Progressing     Problem: SAFETY ADULT  Goal: Patient will remain free of falls  Description: INTERVENTIONS:  - Assess patient frequently for physical needs  -  Identify cognitive and physical deficits and behaviors that affect risk of falls    -  [place] fall precautions as indicated by assessment   - Educate patient/family on patient safety including physical limitations  - Instruct patient to call for assistance with activity based on assessment  - Modify environment to reduce risk of injury  - Consider OT/PT consult to assist with strengthening/mobility  Outcome: Progressing  Goal: Maintain or return to baseline ADL function  Description: INTERVENTIONS:  -  Assess patient's ability to carry out ADLs; assess patient's baseline for ADL function and identify physical deficits which impact ability to perform ADLs (bathing, care of mouth/teeth, toileting, grooming, dressing, etc )  - Assess/evaluate cause of self-care deficits   - Assess range of motion  - Assess patient's mobility; develop plan if impaired  - Assess patient's need for assistive devices and provide as appropriate  - Encourage maximum independence but intervene and supervise when necessary  - Involve family in performance of ADLs  - Assess for home care needs following discharge   - Consider OT consult to assist with ADL evaluation and planning for discharge  - Provide patient education as appropriate  Outcome: Progressing  Goal: Maintain or return mobility status to optimal level  Description: INTERVENTIONS:  - Assess patient's baseline mobility status (ambulation, transfers, stairs, etc )    - Identify cognitive and physical deficits and behaviors that affect mobility  - Identify mobility aids required to assist with transfers and/or ambulation (gait belt, sit-to-stand, lift, walker, cane, etc )  - [place] fall precautions as indicated by assessment  - Record patient progress and toleration of activity level on Mobility SBAR; progress patient to next Phase/Stage  - Instruct patient to call for assistance with activity based on assessment  - Consider rehabilitation consult to assist with strengthening/weightbearing, etc   Outcome: Progressing     Problem: DISCHARGE PLANNING  Goal: Discharge to home or other facility with appropriate resources  Description: INTERVENTIONS:  - Identify barriers to discharge w/patient and caregiver  - Arrange for needed discharge resources and transportation as appropriate  - Identify discharge learning needs (meds, wound care, etc )  - Arrange for interpretive services to assist at discharge as needed  - Refer to Case Management Department for coordinating discharge planning if the patient needs post-hospital services based on physician/advanced practitioner order or complex needs related to functional status, cognitive ability, or social support system  Outcome: Progressing     Problem: Knowledge Deficit  Goal: Patient/family/caregiver demonstrates understanding of disease process, treatment plan, medications, and discharge instructions  Description: Complete learning assessment and assess knowledge base  Interventions:  - Provide teaching at level of understanding  - Provide teaching via preferred learning methods  Outcome: Progressing     Problem: Nutrition/Hydration-ADULT  Goal: Nutrient/Hydration intake appropriate for improving, restoring or maintaining nutritional needs  Description: Monitor and assess patient's nutrition/hydration status for malnutrition  Collaborate with interdisciplinary team and initiate plan and interventions as ordered  Monitor patient's weight and dietary intake as ordered or per policy  Utilize nutrition screening tool and intervene as necessary  Determine patient's food preferences and provide high-protein, high-caloric foods as appropriate       INTERVENTIONS:  - Monitor oral intake, urinary output, labs, and treatment plans  - Assess nutrition and hydration status and recommend course of action  - Evaluate amount of meals eaten  - Assist patient with eating if necessary   - Allow adequate time for meals  - Recommend/ encourage appropriate diets, oral nutritional supplements, and vitamin/mineral supplements  - Order, calculate, and assess calorie counts as needed  - Recommend, monitor, and adjust tube feedings and TPN/PPN based on assessed needs  - Assess need for intravenous fluids  - Provide specific nutrition/hydration education as appropriate  - Include patient/family/caregiver in decisions related to nutrition  Outcome: Progressing     Problem: Prexisting or High Potential for Compromised Skin Integrity  Goal: Skin integrity is maintained or improved  Description: INTERVENTIONS:  - Identify patients at risk for skin breakdown  - Assess and monitor skin integrity  - Assess and monitor nutrition and hydration status  - Monitor labs   - Assess for incontinence   - Turn and reposition patient  - Assist with mobility/ambulation  - Relieve pressure over bony prominences  - Avoid friction and shearing  - Provide appropriate hygiene as needed including keeping skin clean and dry  - Evaluate need for skin moisturizer/barrier cream  - Collaborate with interdisciplinary team   - Patient/family teaching  - Consider wound care consult   Outcome: Progressing

## 2020-09-06 NOTE — PROGRESS NOTES
Vancomycin IV Pharmacy-to-Dose Consultation    Eligio Morgan is a 60 y o  male who is currently receiving Vancomycin IV with management by the Pharmacy Consult service  Assessment/Plan:  The patient was reviewed  Renal function is stable and no signs or symptoms of nephrotoxicity and/or infusion reactions were documented in the chart  Based on todays assessment, continue current vancomycin (day # 11) dosing of vancomycin 1000 mg IV daily PRN when vanco level <20  Dose for today will be held  Plan for random level tomorrow  We will continue to follow the patients culture results and clinical progress daily      Leny Kelley, Pharmacist

## 2020-09-06 NOTE — PROGRESS NOTES
Progress Note - Do Leija 1965, 60 y o  male MRN: [id number]    Unit/Bed#: -01 Encounter: [id number]    Primary Care Provider: Colton Chino MD   Date and time admitted to hospital: 8/28/2020  3:14 PM        * Osteomyelitis [facility])  Assessment & Plan  Recent history of right hallux amputation  Presented with wound dehiscence, wound cellulitis, possible osteomyelitis  2/2 blood culture from 08/28/2020 growing MRSA  Right foot MRI report reviewed and noted for osteomyelitis  Currently afebrile, hemodynamically stable  Cleared by vascular surgery for amputation  Encourage for strict nonweightbearing to right lower extremity at this time  Continue dressing changes per podiatrist recommendation  Podiatry recommendations appreciated  Pod #3     Acute kidney injury (MARKELL) with acute tubular necrosis (ATN) ([facility] [address] )  Assessment & Plan  Likely secondary to sepsis/surgery  Creatinine 2 32  Baseline appears to be less than 1  Continue IV fluids  Will check bladder scan to see if he has postvoid residual  Monitor creatinine closely    MRSA bacteremia  Assessment & Plan  2/2 culture from 08/28/2020 growing Staph aureus  Likely source foot wound  Continue IV vancomycin total of 4 weeks  Vascular surgery, Podiatry following  ID recommendations appreciated  Pharmacy consult for vancomycin management  Type 2 diabetes mellitus with diabetic polyneuropathy, with long-term current use of insulin [facility])  Assessment & Plan  Lab Results   Component Value Date    HGBA1C 8 1 (H) 08/28/2020       Recent Labs     09/05/20  2203 09/06/20  0703 09/06/20  1107 09/06/20  1516   POCGLU 155* 132 239* 181*       Blood Sugar Average: Last 72 hrs:  · (P) 073 [id number]     Uncontrolled  Hemoglobin A1c 8 1  Blood sugars well controlled with current regimen of Lantus 35 units q h s   And Humalog 15 with meals    Will decrease the dose of Lantus to 20 units tonight in anticipation of surgery  Continue sliding scale  Continue to monitor sugars  Continue to monitor for hypoglycemia    Gastroesophageal reflux disease without esophagitis  Assessment & Plan  · Continue PPI    Hypertension, essential  Assessment & Plan  · Controlled  · Continue anti-HTN meds          VTE Pharmacologic Prophylaxis:   Pharmacologic: Enoxaparin (Lovenox)  Mechanical VTE Prophylaxis in Place: Yes    Patient Centered Rounds: I have performed bedside rounds with nursing staff today  Discussions with Specialists or Other Care Team Provider:  Nephrology    Education and Discussions with Family / Patient:  Patient    Time Spent for Care: 30 minutes  More than 50% of total time spent on counseling and coordination of care as described above  Current Length of Stay: 9 day(s)    Current Patient Status: Inpatient   Certification Statement: The patient will continue to require additional inpatient hospital stay due to above medical problems    Discharge Plan: 48 hours    Code Status: Level 1 - Full Code      Subjective:   Patient seen and examined  He had urinary retention which was straight cath x1  Otherwise no acute issues  Objective:     Vitals:   Temp (24hrs), Av 3 °F (36 8 °C), Min:98 2 °F (36 8 °C), Max:98 4 °F (36 9 °C)    Temp:  [98 2 °F (36 8 °C)-98 4 °F (36 9 °C)] 98 4 °F (36 9 °C)  HR:  [70-77] 77  Resp:  [17-18] 18  BP: (122-177)/(72-91) 129/78  SpO2:  [94 %-95 %] 95 %  Body mass index is 30 89 kg/m²  Input and Output Summary (last 24 hours): Intake/Output Summary (Last 24 hours) at 2020 1757  Last data filed at 2020 1401  Gross per 24 hour   Intake 1660 ml   Output 1500 ml   Net 160 ml       Physical Exam:     Physical Exam  Vitals signs and nursing note reviewed  Constitutional:       Appearance: Normal appearance  HENT:      Head: Normocephalic and atraumatic  Nose: Nose normal    Neck:      Musculoskeletal: Normal range of motion and neck supple     Cardiovascular:      Rate and Rhythm: Normal rate and regular rhythm  Pulses: Normal pulses  Heart sounds: Normal heart sounds  Pulmonary:      Effort: Pulmonary effort is normal  No respiratory distress  Breath sounds: Normal breath sounds  Abdominal:      General: Abdomen is flat  Palpations: Abdomen is soft  Musculoskeletal: Normal range of motion  Neurological:      General: No focal deficit present  Mental Status: He is alert and oriented to person, place, and time  Additional Data:     Labs:    Results from last 7 days   Lab Units 09/05/20  0611 09/01/20  0657   WBC Thousand/uL 15 73* 9 07   HEMOGLOBIN g/dL 10 8* 11 1*   HEMATOCRIT % 32 0* 33 3*   PLATELETS Thousands/uL 341 403*   NEUTROS PCT %  --  55   LYMPHS PCT %  --  26   MONOS PCT %  --  12   EOS PCT %  --  5     Results from last 7 days   Lab Units 09/06/20  0657   SODIUM mmol/L 135*   POTASSIUM mmol/L 3 5   CHLORIDE mmol/L 101   CO2 mmol/L 24   BUN mg/dL 34*   CREATININE mg/dL 2 32*   ANION GAP mmol/L 10   CALCIUM mg/dL 9 0   GLUCOSE RANDOM mg/dL 129         Results from last 7 days   Lab Units 09/06/20  1516 09/06/20  1107 09/06/20  0703 09/05/20  2203 09/05/20  1621 09/05/20  0615 09/04/20  2154 09/04/20  1536 09/04/20  0833 09/03/20  2220 09/03/20  1847 09/03/20  1653   POC GLUCOSE mg/dl 181* 239* 132 155* 187* 172* 214* 190* 182* 164* 126 121                   * I Have Reviewed All Lab Data Listed Above  * Additional Pertinent Lab Tests Reviewed: [facility] Admission Reviewed    Imaging:    Imaging Reports Reviewed Today Include:   Imaging Personally Reviewed by Myself Includes:     Recent Cultures (last 7 days):     Results from last 7 days   Lab Units 09/01/20  0703 09/01/20  0657 08/31/20  0615   BLOOD CULTURE  No Growth After 4 Days  No Growth After 4 Days  No Growth After 5 Days         Last 24 Hours Medication List:   Current Facility-Administered Medications   Medication Dose Route Frequency Provider Last Rate    acetaminophen  975 mg Oral Q8H [address] Willem Greco MD      aluminum-magnesium hydroxide-simethicone  30 mL Oral Q6H PRN Shekhar Mari DPM      aspirin  81 mg Oral Daily Destin Bradshaw      atorvastatin  40 mg Oral Daily With [facility], DPM      clopidogrel  75 mg Oral Daily Destin Bradshaw      docusate sodium  100 mg Oral BID Willem Greco MD      insulin glargine  20 Units Subcutaneous HS Shekhar Mari DPM      insulin lispro  1-5 Units Subcutaneous 4x Daily [facility] Shekhar Mari, HERMAN      insulin lispro  15 Units Subcutaneous TID With Meals Shekhar Mari DPM      LORazepam  0 5 mg Oral BID PRN Shekhar Mari DPM      methimazole  5 mg Oral Daily Shekhar Mari DPM      ondansetron  4 mg Intravenous Q6H PRN Shekhar Mari DPM      oxyCODONE  10 mg Oral Q6H PRN Willem Greco MD      oxyCODONE  5 mg Oral Q6H PRN Willem Greco MD      pantoprazole  20 mg Oral Early Morning Shekhar Mari DPM      polyethylene glycol  17 g Oral Daily PRN Shekhar Mari DPM      sodium chloride  100 mL/hr Intravenous Continuous Shekhar Mari  mL/hr (09/06/20 1754)    tamsulosin  0 4 mg Oral Daily With George Yanez MD      vancomycin  10 mg/kg (Adjusted) Intravenous Daily PRN Willem Greco MD      verapamil  240 mg Oral Daily Shekhar Mari DPM      zolpidem  10 mg Oral HS PRN Shekhar Mari DPM          Today, Patient Was Seen By: Willem Gerco MD    ** Please Note: Dictation voice to text software may have been used in the creation of this document   **

## 2020-09-06 NOTE — ASSESSMENT & PLAN NOTE
Recent history of right hallux amputation  Presented with wound dehiscence, wound cellulitis, possible osteomyelitis  2/2 blood culture from 08/28/2020 growing MRSA  Right foot MRI report reviewed and noted for osteomyelitis  Currently afebrile, hemodynamically stable  Cleared by vascular surgery for amputation  Encourage for strict nonweightbearing to right lower extremity at this time  Continue dressing changes per podiatrist recommendation      Podiatry recommendations appreciated  Pod #3

## 2020-09-07 LAB
ANION GAP SERPL CALCULATED.3IONS-SCNC: 10 MMOL/L (ref 4–13)
BUN SERPL-MCNC: 31 MG/DL (ref 5–25)
CALCIUM SERPL-MCNC: 7.9 MG/DL (ref 8.3–10.1)
CHLORIDE SERPL-SCNC: 106 MMOL/L (ref 100–108)
CO2 SERPL-SCNC: 22 MMOL/L (ref 21–32)
CREAT SERPL-MCNC: 2.03 MG/DL (ref 0.6–1.3)
GFR SERPL CREATININE-BSD FRML MDRD: 35 ML/MIN/1.73SQ M
GLUCOSE SERPL-MCNC: 109 MG/DL (ref 65–140)
GLUCOSE SERPL-MCNC: 127 MG/DL (ref 65–140)
GLUCOSE SERPL-MCNC: 190 MG/DL (ref 65–140)
GLUCOSE SERPL-MCNC: 194 MG/DL (ref 65–140)
GLUCOSE SERPL-MCNC: 203 MG/DL (ref 65–140)
POTASSIUM SERPL-SCNC: 3.1 MMOL/L (ref 3.5–5.3)
SODIUM SERPL-SCNC: 138 MMOL/L (ref 136–145)
VANCOMYCIN SERPL-MCNC: 19.1 UG/ML

## 2020-09-07 PROCEDURE — 99232 SBSQ HOSP IP/OBS MODERATE 35: CPT | Performed by: INTERNAL MEDICINE

## 2020-09-07 PROCEDURE — 99233 SBSQ HOSP IP/OBS HIGH 50: CPT | Performed by: INTERNAL MEDICINE

## 2020-09-07 PROCEDURE — 80048 BASIC METABOLIC PNL TOTAL CA: CPT | Performed by: INTERNAL MEDICINE

## 2020-09-07 PROCEDURE — 82948 REAGENT STRIP/BLOOD GLUCOSE: CPT

## 2020-09-07 PROCEDURE — NC001 PR NO CHARGE: Performed by: RADIOLOGY

## 2020-09-07 PROCEDURE — 80202 ASSAY OF VANCOMYCIN: CPT | Performed by: INTERNAL MEDICINE

## 2020-09-07 RX ORDER — INSULIN GLARGINE 100 [IU]/ML
30 INJECTION, SOLUTION SUBCUTANEOUS
Status: DISCONTINUED | OUTPATIENT
Start: 2020-09-07 | End: 2020-09-21 | Stop reason: HOSPADM

## 2020-09-07 RX ORDER — POTASSIUM CHLORIDE 20 MEQ/1
40 TABLET, EXTENDED RELEASE ORAL ONCE
Status: COMPLETED | OUTPATIENT
Start: 2020-09-07 | End: 2020-09-07

## 2020-09-07 RX ORDER — INSULIN GLARGINE 100 [IU]/ML
5 INJECTION, SOLUTION SUBCUTANEOUS ONCE
Status: COMPLETED | OUTPATIENT
Start: 2020-09-07 | End: 2020-09-07

## 2020-09-07 RX ORDER — INSULIN GLARGINE 100 [IU]/ML
35 INJECTION, SOLUTION SUBCUTANEOUS
Status: DISCONTINUED | OUTPATIENT
Start: 2020-09-07 | End: 2020-09-07

## 2020-09-07 RX ADMIN — METHIMAZOLE 5 MG: 5 TABLET ORAL at 08:32

## 2020-09-07 RX ADMIN — ASPIRIN 81 MG 81 MG: 81 TABLET ORAL at 08:32

## 2020-09-07 RX ADMIN — VERAPAMIL HYDROCHLORIDE 240 MG: 120 TABLET, FILM COATED, EXTENDED RELEASE ORAL at 08:31

## 2020-09-07 RX ADMIN — CLOPIDOGREL BISULFATE 75 MG: 75 TABLET ORAL at 08:31

## 2020-09-07 RX ADMIN — PANTOPRAZOLE SODIUM 20 MG: 20 TABLET, DELAYED RELEASE ORAL at 05:17

## 2020-09-07 RX ADMIN — INSULIN GLARGINE 15 UNITS: 100 INJECTION, SOLUTION SUBCUTANEOUS at 22:57

## 2020-09-07 RX ADMIN — INSULIN GLARGINE 5 UNITS: 100 INJECTION, SOLUTION SUBCUTANEOUS at 11:00

## 2020-09-07 RX ADMIN — INSULIN LISPRO 15 UNITS: 100 INJECTION, SOLUTION INTRAVENOUS; SUBCUTANEOUS at 08:32

## 2020-09-07 RX ADMIN — ATORVASTATIN CALCIUM 40 MG: 40 TABLET, FILM COATED ORAL at 15:34

## 2020-09-07 RX ADMIN — DOCUSATE SODIUM 100 MG: 100 CAPSULE, LIQUID FILLED ORAL at 17:10

## 2020-09-07 RX ADMIN — DOCUSATE SODIUM 100 MG: 100 CAPSULE, LIQUID FILLED ORAL at 08:32

## 2020-09-07 RX ADMIN — INSULIN LISPRO 15 UNITS: 100 INJECTION, SOLUTION INTRAVENOUS; SUBCUTANEOUS at 12:12

## 2020-09-07 RX ADMIN — ENOXAPARIN SODIUM 40 MG: 40 INJECTION SUBCUTANEOUS at 11:00

## 2020-09-07 RX ADMIN — INSULIN LISPRO 15 UNITS: 100 INJECTION, SOLUTION INTRAVENOUS; SUBCUTANEOUS at 17:10

## 2020-09-07 RX ADMIN — INSULIN LISPRO 1 UNITS: 100 INJECTION, SOLUTION INTRAVENOUS; SUBCUTANEOUS at 12:12

## 2020-09-07 RX ADMIN — POTASSIUM CHLORIDE 40 MEQ: 1500 TABLET, EXTENDED RELEASE ORAL at 10:03

## 2020-09-07 RX ADMIN — SODIUM CHLORIDE 100 ML/HR: 0.9 INJECTION, SOLUTION INTRAVENOUS at 12:40

## 2020-09-07 RX ADMIN — TAMSULOSIN HYDROCHLORIDE 0.4 MG: 0.4 CAPSULE ORAL at 15:34

## 2020-09-07 RX ADMIN — INSULIN LISPRO 1 UNITS: 100 INJECTION, SOLUTION INTRAVENOUS; SUBCUTANEOUS at 08:33

## 2020-09-07 NOTE — ASSESSMENT & PLAN NOTE
· Controlled  · Continue verapamil   Avalide held secondary to acute kidney injury  · Continue to monitor blood pressure closely

## 2020-09-07 NOTE — PLAN OF CARE
Problem: Potential for Falls  Goal: Patient will remain free of falls  Description: INTERVENTIONS:  - Assess patient frequently for physical needs  -  Identify cognitive and physical deficits and behaviors that affect risk of falls    -  [place] fall precautions as indicated by assessment   - Educate patient/family on patient safety including physical limitations  - Instruct patient to call for assistance with activity based on assessment  - Modify environment to reduce risk of injury  - Consider OT/PT consult to assist with strengthening/mobility  Outcome: Progressing     Problem: PAIN - ADULT  Goal: Verbalizes/displays adequate comfort level or baseline comfort level  Description: Interventions:  - Encourage patient to monitor pain and request assistance  - Assess pain using appropriate pain scale  - Administer analgesics based on type and severity of pain and evaluate response  - Implement non-pharmacological measures as appropriate and evaluate response  - Consider cultural and social influences on pain and pain management  - Notify physician/advanced practitioner if interventions unsuccessful or patient reports new pain  Outcome: Progressing     Problem: INFECTION - ADULT  Goal: Absence or prevention of progression during hospitalization  Description: INTERVENTIONS:  - Assess and monitor for signs and symptoms of infection  - Monitor lab/diagnostic results  - Monitor all insertion sites, i e  indwelling lines, tubes, and drains  - Monitor endotracheal if appropriate and nasal secretions for changes in amount and color  - [place] appropriate cooling/warming therapies per order  - Administer medications as ordered  - Instruct and encourage patient and family to use good hand hygiene technique  - Identify and instruct in appropriate isolation precautions for identified infection/condition  Outcome: Progressing  Goal: Absence of fever/infection during neutropenic period  Description: INTERVENTIONS:  - Monitor WBC    Outcome: Progressing     Problem: SAFETY ADULT  Goal: Patient will remain free of falls  Description: INTERVENTIONS:  - Assess patient frequently for physical needs  -  Identify cognitive and physical deficits and behaviors that affect risk of falls    -  [place] fall precautions as indicated by assessment   - Educate patient/family on patient safety including physical limitations  - Instruct patient to call for assistance with activity based on assessment  - Modify environment to reduce risk of injury  - Consider OT/PT consult to assist with strengthening/mobility  Outcome: Progressing  Goal: Maintain or return to baseline ADL function  Description: INTERVENTIONS:  -  Assess patient's ability to carry out ADLs; assess patient's baseline for ADL function and identify physical deficits which impact ability to perform ADLs (bathing, care of mouth/teeth, toileting, grooming, dressing, etc )  - Assess/evaluate cause of self-care deficits   - Assess range of motion  - Assess patient's mobility; develop plan if impaired  - Assess patient's need for assistive devices and provide as appropriate  - Encourage maximum independence but intervene and supervise when necessary  - Involve family in performance of ADLs  - Assess for home care needs following discharge   - Consider OT consult to assist with ADL evaluation and planning for discharge  - Provide patient education as appropriate  Outcome: Progressing  Goal: Maintain or return mobility status to optimal level  Description: INTERVENTIONS:  - Assess patient's baseline mobility status (ambulation, transfers, stairs, etc )    - Identify cognitive and physical deficits and behaviors that affect mobility  - Identify mobility aids required to assist with transfers and/or ambulation (gait belt, sit-to-stand, lift, walker, cane, etc )  - [place] fall precautions as indicated by assessment  - Record patient progress and toleration of activity level on Mobility SBAR; progress patient to next Phase/Stage  - Instruct patient to call for assistance with activity based on assessment  - Consider rehabilitation consult to assist with strengthening/weightbearing, etc   Outcome: Progressing     Problem: DISCHARGE PLANNING  Goal: Discharge to home or other facility with appropriate resources  Description: INTERVENTIONS:  - Identify barriers to discharge w/patient and caregiver  - Arrange for needed discharge resources and transportation as appropriate  - Identify discharge learning needs (meds, wound care, etc )  - Arrange for interpretive services to assist at discharge as needed  - Refer to Case Management Department for coordinating discharge planning if the patient needs post-hospital services based on physician/advanced practitioner order or complex needs related to functional status, cognitive ability, or social support system  Outcome: Progressing     Problem: Knowledge Deficit  Goal: Patient/family/caregiver demonstrates understanding of disease process, treatment plan, medications, and discharge instructions  Description: Complete learning assessment and assess knowledge base  Interventions:  - Provide teaching at level of understanding  - Provide teaching via preferred learning methods  Outcome: Progressing     Problem: Nutrition/Hydration-ADULT  Goal: Nutrient/Hydration intake appropriate for improving, restoring or maintaining nutritional needs  Description: Monitor and assess patient's nutrition/hydration status for malnutrition  Collaborate with interdisciplinary team and initiate plan and interventions as ordered  Monitor patient's weight and dietary intake as ordered or per policy  Utilize nutrition screening tool and intervene as necessary  Determine patient's food preferences and provide high-protein, high-caloric foods as appropriate       INTERVENTIONS:  - Monitor oral intake, urinary output, labs, and treatment plans  - Assess nutrition and hydration status and recommend course of action  - Evaluate amount of meals eaten  - Assist patient with eating if necessary   - Allow adequate time for meals  - Recommend/ encourage appropriate diets, oral nutritional supplements, and vitamin/mineral supplements  - Order, calculate, and assess calorie counts as needed  - Recommend, monitor, and adjust tube feedings and TPN/PPN based on assessed needs  - Assess need for intravenous fluids  - Provide specific nutrition/hydration education as appropriate  - Include patient/family/caregiver in decisions related to nutrition  Outcome: Progressing     Problem: Prexisting or High Potential for Compromised Skin Integrity  Goal: Skin integrity is maintained or improved  Description: INTERVENTIONS:  - Identify patients at risk for skin breakdown  - Assess and monitor skin integrity  - Assess and monitor nutrition and hydration status  - Monitor labs   - Assess for incontinence   - Turn and reposition patient  - Assist with mobility/ambulation  - Relieve pressure over bony prominences  - Avoid friction and shearing  - Provide appropriate hygiene as needed including keeping skin clean and dry  - Evaluate need for skin moisturizer/barrier cream  - Collaborate with interdisciplinary team   - Patient/family teaching  - Consider wound care consult   Outcome: Progressing

## 2020-09-07 NOTE — ASSESSMENT & PLAN NOTE
Likely secondary to sepsis/surgery  Creatinine peaked to 2 32, trended down to 2 0 today  Baseline appears to be less than 1  Continue IV fluids for today  Patient did have serial bladder scans, had 1 straight catheterization yesterday  Will continue capacity bladder scans today    Flomax added for urinary retention

## 2020-09-07 NOTE — CONSULTS
Consultation - Interventional Radiology  Mckinley Stoner 60 y o  male MRN: [id number]  Unit/Bed#: -01 Encounter: [id number]        Consults     Pain requires durable venous access for long-term outpatient antibiotics  Blood cultures negative    WBC to 15 today  MARKELL with creatinine above 2    Plan  - NPO after midnight for possible narrow caliber tunneled jugular central venous catheter placement  -please order  a m  CBC  - recommend infectious disease input in the morning regarding suitability for this given rising leukocytosis  - pt noted to be on plavix        ASSESSMENT/PLAN:  Problem List     * (Principal) Osteomyelitis ([place] [address] )    Hypertension, essential    Gastroesophageal reflux disease without esophagitis    Hyperlipidemia    Overview Signed 10/29/2018  4:13 PM by Jimy Obrien PA-C     Overview:   Per Lipid Taxonomy             History of nonadherence to medical treatment    Obesity, Class I, BMI 30 0-34 9 (see actual BMI)    Overview Signed 10/29/2018  4:13 PM by Jimy Obrien PA-C     Overview:   Per Obesity Taxonomy         Type 2 diabetes mellitus with hyperglycemia, with long-term current use of insulin ([place] [address] )    Lab Results   Component Value Date    HGBA1C 8 1 (H) 08/28/2020       Recent Labs     09/06/20  1107 09/06/20  1516 09/06/20  2131 09/07/20  0704   POCGLU 239* 181* 250* 203*       Blood Sugar Average: Last 72 hrs:  (P) 068 [id number]        Diabetes mellitus ([place] [address] )    Lab Results   Component Value Date    HGBA1C 8 1 (H) 08/28/2020       Recent Labs     09/06/20  1107 09/06/20  1516 09/06/20  2131 09/07/20  0704   POCGLU 239* 181* 250* 203*       Blood Sugar Average: Last 72 hrs:  (P) 930 [id number]        Type 2 diabetes mellitus with diabetic polyneuropathy, with long-term current use of insulin ([facility])    Lab Results   Component Value Date    HGBA1C 8 1 (H) 08/28/2020       Recent Labs     09/06/20  1107 09/06/20  1516 09/06/20  2131 09/07/20  0704   POCGLU 239* 181* 250* 203*       Blood Sugar Average: Last 72 hrs:  (P) 150 [id number]        Vertigo    Right-sided tinnitus    Colon polyps    Elevated liver enzymes    Hyperthyroidism    Health maintenance examination    Gangrene of toe of right foot (HCC)    Severe sepsis (HCC)    Acute kidney injury (HCC)    Hyponatremia    PAD (peripheral artery disease) (HCC)    Amputation of right great toe (HCC)    Phantom 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 (HCC)    Thyroid cancer ([place] [address] )    MRSA bacteremia    Acute kidney injury (MARKELL) with acute tubular necrosis (ATN) ([place] [address] )          Reason for Consult / Principal Problem:    HPI: Patricia Shearer is a 60y o  year old male who presents with Osteomyelitis ([place] [address] )      Review of Systems      Past Medical History:  Past Medical History:   Diagnosis Date    Diabetes mellitus ([place] [address] )     Hyperlipidemia     Hypertension     Thyroid cancer ([place] [address] )        Past Surgical History:  Past Surgical History:   Procedure Laterality Date    FOOT SURGERY Right 2014    IR AORTAGRAM WITH RUN-OFF  8/6/2020    GA AMPUTATION FOOT,TRANSMETATARSAL Right 9/3/2020    Procedure: AMPUTATION TRANSMETATARSAL (TMA);  Surgeon: Miranda Guerrero DPM;  Location: [facility];  Service: Podiatry    TOE AMPUTATION Right 8/11/2020    Procedure: AMPUTATION TOE;  Surgeon: Miranda Guerrero DPM;  Location: [facility];  Service: [address] [place] [address] THYROID BIOPSY  7/2/2020       Social History:  Social History     Substance and Sexual Activity   Alcohol Use Not Currently    Alcohol/week: 8 0 standard drinks    Types: 8 Cans of beer per week    Comment: last drink was 10 days ago     Social History     Substance and Sexual Activity   Drug Use No    Types: Marijuana     Social History     Tobacco Use   Smoking Status Never Smoker   Smokeless Tobacco Never Used       Family History:  Family History   Problem Relation Age of Onset    Cancer Mother     Hypertension Father        Allergies:  No Known Allergies    Medications:  Medications Prior to Admission   Medication    aspirin 81 mg chewable tablet    atorvastatin (LIPITOR) 40 mg tablet    AVALIDE 300-12 5 MG per tablet    clopidogrel (PLAVIX) 75 mg tablet    insulin glargine (Lantus SoloStar) 100 units/mL injection pen    insulin glulisine (Apidra SoloStar) 100 units/mL injection pen    methimazole (TAPAZOLE) 5 mg tablet    omeprazole (PriLOSEC) 40 MG capsule    verapamil (VERELAN PM) 360 MG 24 hr capsule    zolpidem (AMBIEN) 10 mg tablet    BD PEN NEEDLE DEV U/F 32G X 4 MM MISC    Blood Glucose Monitoring Suppl (ONE TOUCH ULTRA 2) w/Device KIT    glucose blood test strip     Current Facility-Administered Medications   Medication Dose Route Frequency    acetaminophen (TYLENOL) tablet 975 mg  975 mg Oral Q8H [address]    aluminum-magnesium hydroxide-simethicone (MYLANTA) 200-200-20 mg/5 mL oral suspension 30 mL  30 mL Oral Q6H PRN    aspirin chewable tablet 81 mg  81 mg Oral Daily    atorvastatin (LIPITOR) tablet 40 mg  40 mg Oral Daily With Dinner    clopidogrel (PLAVIX) tablet 75 mg  75 mg Oral Daily    docusate sodium (COLACE) capsule 100 mg  100 mg Oral BID    insulin glargine (LANTUS) subcutaneous injection 20 Units 0 2 mL  20 Units Subcutaneous HS    insulin lispro (HumaLOG) 100 units/mL subcutaneous injection 1-5 Units  1-5 Units Subcutaneous 4x Daily (AC & HS)    insulin lispro (HumaLOG) 100 units/mL subcutaneous injection 15 Units  15 Units Subcutaneous TID With Meals    LORazepam (ATIVAN) tablet 0 5 mg  0 5 mg Oral BID PRN    methimazole (TAPAZOLE) tablet 5 mg  5 mg Oral Daily    ondansetron (ZOFRAN) injection 4 mg  4 mg Intravenous Q6H PRN    oxyCODONE (ROXICODONE) immediate release tablet 10 mg  10 mg Oral Q6H PRN    oxyCODONE (ROXICODONE) IR tablet 5 mg  5 mg Oral Q6H PRN    pantoprazole (PROTONIX) EC tablet 20 mg  20 mg Oral Early Morning    polyethylene glycol (MIRALAX) packet 17 g  17 g Oral Daily PRN    potassium chloride (K-DUR,KLOR-CON) CR tablet 40 mEq  40 mEq Oral Once    sodium chloride 0 9 % infusion  100 mL/hr Intravenous Continuous    tamsulosin (FLOMAX) capsule 0 4 mg  0 4 mg Oral Daily With Dinner    vancomycin (VANCOCIN) IVPB (premix) 1,000 mg 200 mL  10 mg/kg (Adjusted) Intravenous Daily PRN    verapamil (CALAN-SR) CR tablet 240 mg  240 mg Oral Daily    zolpidem (AMBIEN) tablet 10 mg  10 mg Oral HS PRN       Vitals:  /93 (BP Location: Right arm)   Pulse 78   Temp 98 1 °F (36 7 °C) (Oral)   Resp 18   Ht 6' 4" (1 93 m)   Wt 115 kg (253 lb 12 oz)   SpO2 95%   BMI 30 89 kg/m²   Body mass index is 30 89 kg/m²    Weight (last 2 days)     None          I/Os:    Intake/Output Summary (Last 24 hours) at 9/7/2020 0914  Last data filed at 9/7/2020 0754  Gross per 24 hour   Intake 150 ml   Output 1400 ml   Net -1250 ml       PHYSICAL EXAM  Did not examine    Lab Results and Cultures:   CBC with diff:   Lab Results   Component Value Date    WBC 15 73 (H) 09/05/2020    HGB 10 8 (L) 09/05/2020    HCT 32 0 (L) 09/05/2020    MCV 90 09/05/2020     09/05/2020    MCH 30 4 09/05/2020    MCHC 33 8 09/05/2020    RDW 12 1 09/05/2020    MPV 10 4 09/05/2020    NRBC 0 09/01/2020      BMP/CMP:  Lab Results   Component Value Date    K 3 1 (L) 09/07/2020     09/07/2020    CO2 22 09/07/2020    BUN 31 (H) 09/07/2020    CREATININE 2 03 (H) 09/07/2020    CALCIUM 7 9 (L) 09/07/2020    AST 50 (H) 08/28/2020    ALT 44 08/28/2020    ALKPHOS 97 08/28/2020    EGFR 35 09/07/2020   ,     Coags:   Lab Results   Component Value Date    PTT 29 08/04/2020    INR 1 04 08/04/2020   ,          Lipid Panel: No results found for: CHOL  Lab Results   Component Value Date    HDL 25 (L) 08/05/2020     Lab Results   Component Value Date    HDL 25 (L) 08/05/2020     Lab Results   Component Value Date    LDLCALC 116 (H) 08/05/2020     Lab Results   Component Value Date    TRIG 76 08/05/2020       HgbA1c:   Lab Results   Component Value Date    HGBA1C 8 1 (H) 08/28/2020    HGBA1C 9 7 (H) 08/05/2020    HGBA1C 10 0 (H) 07/31/2020       Blood Culture:   Lab Results   Component Value Date    BLOODCX No Growth After 5 Days  09/01/2020   ,   Urinalysis:   Lab Results   Component Value Date    COLORU Yellow 09/27/2019    CLARITYU Clear 09/27/2019    SPECGRAV 1 020 09/27/2019    PHUR 5 5 09/27/2019    PHUR 6 0 11/15/2018    LEUKOCYTESUR (A) 09/27/2019     Elevated glucose may cause decreased leukocyte values  See urine microscopic for [facility] result/    NITRITE Negative 09/27/2019    GLUCOSEU >=1000 (1%) (A) 09/27/2019    KETONESU Negative 09/27/2019    BILIRUBINUR Negative 09/27/2019    BLOODU Moderate (A) 09/27/2019   ,   Urine Culture: No results found for: URINECX,   Wound Culure:    Lab Results   Component Value Date    WOUNDCULT 2+ Growth of Morganella morganii (A) 08/30/2020    WOUNDCULT (A) 08/30/2020     2+ Growth of Methicillin Resistant Staphylococcus aureus    WOUNDCULT 1+ Growth of Enterobacter cloacae complex (A) 08/30/2020    WOUNDCULT 1+ Growth of Enterococcus faecalis (A) 08/30/2020       Imaging Studies: I have personally reviewed pertinent films in PACS    Counseling / Coordination of Care  Total time spent today  20 minutes  Greater than 50% of total time was spent with the patient and / or family counseling and / or coordination of care  Thank you for allowing me to participate in the care of Margarita Bustos

## 2020-09-07 NOTE — PROGRESS NOTES
Progress Note - Infectious Disease   Thom Hollis 60 y o  male MRN: [id number]  Unit/Bed#: -01 Encounter: [id number]      A/P:  1  MRSA bacteremia   Secondary to #2/3   Negative 2D echo   No intravascular prosthetic devices   Repeat blood culture remain negative      -continue IV vancomycin with dosing recommendations per pharmacy  Last vancomycin level is at goal   -plan for 4 week course of IV antibiotics from negative blood culture, through 9/27   -check weekly CBC with diff, creatinine, vancomycin level  -outpatient ID follow-up after hospital discharge  -plan for tunnel jugular central line placement tomorrow  Okay to proceed from ID standpoint as repeat blood cultures are all negative and patient remains clinically improved      2  Right foot cellulitis   Suspect secondary to MRSA   Wound culture also shows Morganella, Enterobacter, Enterococcus, which is not unexpected finding in the setting of chronic ulceration   Would continue targeted therapy for MRSA infection  Now status post TMA     -antibiotic plan as above  -serial foot exams     3  Right foot nonhealing ulceration   With residual 1st and 2nd toe osteomyelitis   Podiatry input noted  Now s/p TMA on 09/03      -antibiotic plan as above  -daily local wound care and dressing change  -outpatient podiatry follow-up     4  Status post 1st digit amputation on 08/11/2020   Complicated by above      5  PAD   Status post recent revascularization   Vascular surgery input noted      6  Uncontrolled diabetes mellitus   Risk factor for severe infection  7  Leukocytosis  Last WBC count was 15  Likely reactive in the immediate postoperative setting  Remains afebrile  Blood cultures showed clearance of bacteremia     -antibiotic plan as above  -recheck CBC in a m   -monitor temperatures       Vancomycin  POD4            Subjective:  No acute complaints  Denies fevers, chills, or sweats  Denies nausea, vomiting, or diarrhea  Objective:  Vitals:  Temp:  [98 1 °F (36 7 °C)-98 4 °F (36 9 °C)] 98 1 °F (36 7 °C)  HR:  [77-78] 78  Resp:  [18] 18  BP: (129-158)/(78-93) 158/93  SpO2:  [95 %] 95 %  Temp (24hrs), Av 3 °F (36 8 °C), Min:98 1 °F (36 7 °C), Max:98 4 °F (36 9 °C)  Current: Temperature: 98 1 °F (36 7 °C)    Physical Exam:   General:  No acute distress  HEENT:  Atraumatic normocephalic  Psychiatric:  Awake and alert  Pulmonary:  Normal respiratory excursion without accessory muscle use  Abdomen:  Soft, nontender  Extremities:  No edema  Foot dressing intact with no ascending erythema  Skin:  No rashes    Lab Results:  I have personally reviewed pertinent labs  Results from last 7 days   Lab Units 20  0447 20  0657 20  0611   POTASSIUM mmol/L 3 1* 3 5 3 8   CHLORIDE mmol/L 106 101 100   CO2 mmol/L 22 24 24   BUN mg/dL 31* 34* 34*   CREATININE mg/dL 2 03* 2 32* 2 28*   EGFR ml/min/1 73sq m 35 30 31   CALCIUM mg/dL 7 9* 9 0 8 9     Results from last 7 days   Lab Units 20  0611 20  0657   WBC Thousand/uL 15 73* 9 07   HEMOGLOBIN g/dL 10 8* 11 1*   PLATELETS Thousands/uL 341 403*     Results from last 7 days   Lab Units 20  0703 20  0657   BLOOD CULTURE  No Growth After 5 Days  No Growth After 5 Days  Imaging Studies:   I have personally reviewed pertinent imaging study reports and images in PACS  EKG, Pathology, and Other Studies:   I have personally reviewed pertinent reports

## 2020-09-07 NOTE — PROGRESS NOTES
Vancomycin IV Pharmacy-to-Dose Consultation    Kourtney Green is a 60 y o  male who is currently receiving Vancomycin IV with management by the Pharmacy Consult service  Assessment/Plan:  The patient was reviewed  Renal function is stable and no signs or symptoms of nephrotoxicity and/or infusion reactions were documented in the chart  Based on todays assessment, continue current vancomycin (day # 12) dosing of 1000 mg IV daily PRN random vanco level < 20, with a plan for daily random levels to be drawn  Level is 19 1 today and dose will be sent  We will continue to follow the patients culture results and clinical progress daily      Vince Solorzano, Pharmacist

## 2020-09-07 NOTE — PLAN OF CARE
Problem: Potential for Falls  Goal: Patient will remain free of falls  Description: INTERVENTIONS:  - Assess patient frequently for physical needs  -  Identify cognitive and physical deficits and behaviors that affect risk of falls    -  [place] fall precautions as indicated by assessment   - Educate patient/family on patient safety including physical limitations  - Instruct patient to call for assistance with activity based on assessment  - Modify environment to reduce risk of injury  - Consider OT/PT consult to assist with strengthening/mobility  Outcome: Progressing     Problem: PAIN - ADULT  Goal: Verbalizes/displays adequate comfort level or baseline comfort level  Description: Interventions:  - Encourage patient to monitor pain and request assistance  - Assess pain using appropriate pain scale  - Administer analgesics based on type and severity of pain and evaluate response  - Implement non-pharmacological measures as appropriate and evaluate response  - Consider cultural and social influences on pain and pain management  - Notify physician/advanced practitioner if interventions unsuccessful or patient reports new pain  Outcome: Progressing     Problem: INFECTION - ADULT  Goal: Absence or prevention of progression during hospitalization  Description: INTERVENTIONS:  - Assess and monitor for signs and symptoms of infection  - Monitor lab/diagnostic results  - Monitor all insertion sites, i e  indwelling lines, tubes, and drains  - Monitor endotracheal if appropriate and nasal secretions for changes in amount and color  - [place] appropriate cooling/warming therapies per order  - Administer medications as ordered  - Instruct and encourage patient and family to use good hand hygiene technique  - Identify and instruct in appropriate isolation precautions for identified infection/condition  Outcome: Progressing  Goal: Absence of fever/infection during neutropenic period  Description: INTERVENTIONS:  - Monitor WBC    Outcome: Progressing     Problem: SAFETY ADULT  Goal: Patient will remain free of falls  Description: INTERVENTIONS:  - Assess patient frequently for physical needs  -  Identify cognitive and physical deficits and behaviors that affect risk of falls    -  [place] fall precautions as indicated by assessment   - Educate patient/family on patient safety including physical limitations  - Instruct patient to call for assistance with activity based on assessment  - Modify environment to reduce risk of injury  - Consider OT/PT consult to assist with strengthening/mobility  Outcome: Progressing  Goal: Maintain or return to baseline ADL function  Description: INTERVENTIONS:  -  Assess patient's ability to carry out ADLs; assess patient's baseline for ADL function and identify physical deficits which impact ability to perform ADLs (bathing, care of mouth/teeth, toileting, grooming, dressing, etc )  - Assess/evaluate cause of self-care deficits   - Assess range of motion  - Assess patient's mobility; develop plan if impaired  - Assess patient's need for assistive devices and provide as appropriate  - Encourage maximum independence but intervene and supervise when necessary  - Involve family in performance of ADLs  - Assess for home care needs following discharge   - Consider OT consult to assist with ADL evaluation and planning for discharge  - Provide patient education as appropriate  Outcome: Progressing  Goal: Maintain or return mobility status to optimal level  Description: INTERVENTIONS:  - Assess patient's baseline mobility status (ambulation, transfers, stairs, etc )    - Identify cognitive and physical deficits and behaviors that affect mobility  - Identify mobility aids required to assist with transfers and/or ambulation (gait belt, sit-to-stand, lift, walker, cane, etc )  - [place] fall precautions as indicated by assessment  - Record patient progress and toleration of activity level on Mobility SBAR; progress patient to next Phase/Stage  - Instruct patient to call for assistance with activity based on assessment  - Consider rehabilitation consult to assist with strengthening/weightbearing, etc   Outcome: Progressing     Problem: DISCHARGE PLANNING  Goal: Discharge to home or other facility with appropriate resources  Description: INTERVENTIONS:  - Identify barriers to discharge w/patient and caregiver  - Arrange for needed discharge resources and transportation as appropriate  - Identify discharge learning needs (meds, wound care, etc )  - Arrange for interpretive services to assist at discharge as needed  - Refer to Case Management Department for coordinating discharge planning if the patient needs post-hospital services based on physician/advanced practitioner order or complex needs related to functional status, cognitive ability, or social support system  Outcome: Progressing     Problem: Knowledge Deficit  Goal: Patient/family/caregiver demonstrates understanding of disease process, treatment plan, medications, and discharge instructions  Description: Complete learning assessment and assess knowledge base  Interventions:  - Provide teaching at level of understanding  - Provide teaching via preferred learning methods  Outcome: Progressing     Problem: Nutrition/Hydration-ADULT  Goal: Nutrient/Hydration intake appropriate for improving, restoring or maintaining nutritional needs  Description: Monitor and assess patient's nutrition/hydration status for malnutrition  Collaborate with interdisciplinary team and initiate plan and interventions as ordered  Monitor patient's weight and dietary intake as ordered or per policy  Utilize nutrition screening tool and intervene as necessary  Determine patient's food preferences and provide high-protein, high-caloric foods as appropriate       INTERVENTIONS:  - Monitor oral intake, urinary output, labs, and treatment plans  - Assess nutrition and hydration status and recommend course of action  - Evaluate amount of meals eaten  - Assist patient with eating if necessary   - Allow adequate time for meals  - Recommend/ encourage appropriate diets, oral nutritional supplements, and vitamin/mineral supplements  - Order, calculate, and assess calorie counts as needed  - Recommend, monitor, and adjust tube feedings and TPN/PPN based on assessed needs  - Assess need for intravenous fluids  - Provide specific nutrition/hydration education as appropriate  - Include patient/family/caregiver in decisions related to nutrition  Outcome: Progressing     Problem: Prexisting or High Potential for Compromised Skin Integrity  Goal: Skin integrity is maintained or improved  Description: INTERVENTIONS:  - Identify patients at risk for skin breakdown  - Assess and monitor skin integrity  - Assess and monitor nutrition and hydration status  - Monitor labs   - Assess for incontinence   - Turn and reposition patient  - Assist with mobility/ambulation  - Relieve pressure over bony prominences  - Avoid friction and shearing  - Provide appropriate hygiene as needed including keeping skin clean and dry  - Evaluate need for skin moisturizer/barrier cream  - Collaborate with interdisciplinary team   - Patient/family teaching  - Consider wound care consult   Outcome: Progressing     Problem: Prexisting or High Potential for Compromised Skin Integrity  Goal: Skin integrity is maintained or improved  Description: INTERVENTIONS:  - Identify patients at risk for skin breakdown  - Assess and monitor skin integrity  - Assess and monitor nutrition and hydration status  - Monitor labs   - Assess for incontinence   - Turn and reposition patient  - Assist with mobility/ambulation  - Relieve pressure over bony prominences  - Avoid friction and shearing  - Provide appropriate hygiene as needed including keeping skin clean and dry  - Evaluate need for skin moisturizer/barrier cream  - Collaborate with interdisciplinary team   - Patient/family teaching  - Consider wound care consult   Outcome: Progressing

## 2020-09-07 NOTE — ASSESSMENT & PLAN NOTE
Recent history of right hallux amputation  Presented with wound dehiscence, wound cellulitis, possible osteomyelitis  2/2 blood culture from 08/28/2020 growing MRSA  Right foot MRI report reviewed and noted for osteomyelitis  Currently afebrile, hemodynamically stable  Encourage for strict nonweightbearing to right lower extremity at this time  Continue dressing changes per podiatrist recommendation      Podiatry recommendations appreciated  Pod #4 status post right-sided TMA  Continue IV vancomycin a total of 4 weeks through 9/27  Due to patient's worsening creatinine order for central line by IR was placed insert of PICC line in an attempt to preserve the arm in case patient's creatinine deteriorates and were to need dialysis

## 2020-09-07 NOTE — ASSESSMENT & PLAN NOTE
2/2 culture from 08/28/2020 growing Staph aureus  Likely source foot wound  Continue IV vancomycin total of 4 weeks through 9/27  Vascular surgery, Podiatry following  ID recommendations appreciated  Pharmacy consult for vancomycin management

## 2020-09-07 NOTE — PROGRESS NOTES
Progress Note - Hector Antoine 1965, 60 y o  male MRN: [id number]    Unit/Bed#: -01 Encounter: [id number]    Primary Care Provider: Kelly Renteria MD   Date and time admitted to hospital: 8/28/2020  3:14 PM        * Osteomyelitis [facility])  Assessment & Plan  Recent history of right hallux amputation  Presented with wound dehiscence, wound cellulitis, possible osteomyelitis  2/2 blood culture from 08/28/2020 growing MRSA  Right foot MRI report reviewed and noted for osteomyelitis  Currently afebrile, hemodynamically stable  Encourage for strict nonweightbearing to right lower extremity at this time  Continue dressing changes per podiatrist recommendation  Podiatry recommendations appreciated  Pod #4 status post right-sided TMA  Continue IV vancomycin a total of 4 weeks through 9/27  Due to patient's worsening creatinine order for central line by IR was placed insert of PICC line in an attempt to preserve the arm in case patient's creatinine deteriorates and were to need dialysis      Acute kidney injury (MARKELL) with acute tubular necrosis (ATN) ([facility] [address] )  Assessment & Plan  Likely secondary to sepsis/surgery  Creatinine peaked to 2 32, trended down to 2 0 today  Baseline appears to be less than 1  Continue IV fluids for today  Patient did have serial bladder scans, had 1 straight catheterization yesterday  Will continue capacity bladder scans today  Flomax added for urinary retention    MRSA bacteremia  Assessment & Plan  2/2 culture from 08/28/2020 growing Staph aureus  Likely source foot wound  Continue IV vancomycin total of 4 weeks through 9/27  Vascular surgery, Podiatry following  ID recommendations appreciated  Pharmacy consult for vancomycin management  PAD (peripheral artery disease) [facility])  Assessment & Plan  Patient history of recent right popliteal PTA/ stent 08/06/2020 and right hallux amputation  Now noted with osteomyelitis as evident on right foot MRI report    Cleared by vascular surgery for further debridement and/or amputation  Continue aspirin, statin, Plavix  Follow-up with vascular surgery recommendation as well as podiatry following  Hyperthyroidism  Assessment & Plan  · Diagnosed with thyroid cancer in July  · TSH 0 080  · Free T4 1 39  · Continue home dose of methimazole 5 mg daily  · Recommended close outpatient follow-up  Type 2 diabetes mellitus with diabetic polyneuropathy, with long-term current use of insulin [facility])  Assessment & Plan  Lab Results   Component Value Date    HGBA1C 8 1 (H) 08/28/2020       Recent Labs     09/06/20  1107 09/06/20  1516 09/06/20  2131 09/07/20  0704   POCGLU 239* 181* 250* 203*       Blood Sugar Average: Last 72 hrs:  · (P) 213 [id number]     Uncontrolled  Hemoglobin A1c 8 1  Blood sugars elevated today  Will adjust the dose of Lantus   Continue to monitor sugar        Gastroesophageal reflux disease without esophagitis  Assessment & Plan  · Continue PPI    Hypertension, essential  Assessment & Plan  · Controlled  · Continue verapamil  Avalide held secondary to acute kidney injury  · Continue to monitor blood pressure closely        VTE Pharmacologic Prophylaxis:   Pharmacologic: Heparin  Mechanical VTE Prophylaxis in Place: Yes    Patient Centered Rounds: I have performed bedside rounds with nursing staff today  Discussions with Specialists or Other Care Team Provider: cm    Education and Discussions with Family / Patient:  Wife at the bedside    Time Spent for Care: 30 minutes  More than 50% of total time spent on counseling and coordination of care as described above  Current Length of Stay: 10 day(s)    Current Patient Status: Inpatient   Certification Statement: The patient will continue to require additional inpatient hospital stay due to Above medical problems    Discharge Plan:  Monitor creatinine and pending placement to rehab        Code Status: Level 1 - Full Code      Subjective:   Patient seen and examined  No acute complaints at this time  No chest pain or shortness of breath  No foot pain  No fevers or chills  No urinary issues at this time  No diarrhea  Objective:     Vitals:   Temp (24hrs), Av 3 °F (36 8 °C), Min:98 1 °F (36 7 °C), Max:98 4 °F (36 9 °C)    Temp:  [98 1 °F (36 7 °C)-98 4 °F (36 9 °C)] 98 1 °F (36 7 °C)  HR:  [77-78] 78  Resp:  [18] 18  BP: (129-158)/(78-93) 158/93  SpO2:  [95 %] 95 %  Body mass index is 30 89 kg/m²  Input and Output Summary (last 24 hours): Intake/Output Summary (Last 24 hours) at 2020 1045  Last data filed at 2020 0754  Gross per 24 hour   Intake 150 ml   Output 1400 ml   Net -1250 ml       Physical Exam:     Physical Exam  Vitals signs and nursing note reviewed  Constitutional:       Appearance: Normal appearance  HENT:      Head: Normocephalic and atraumatic  Neck:      Musculoskeletal: Normal range of motion  Cardiovascular:      Rate and Rhythm: Normal rate and regular rhythm  Pulses: Normal pulses  Heart sounds: Normal heart sounds  Pulmonary:      Effort: Pulmonary effort is normal       Breath sounds: Normal breath sounds  Abdominal:      General: Abdomen is flat  Palpations: Abdomen is soft  Musculoskeletal: Normal range of motion  Skin:     General: Skin is warm and dry  Neurological:      General: No focal deficit present  Mental Status: He is alert and oriented to person, place, and time             Additional Data:     Labs:    Results from last 7 days   Lab Units 20  0611 20  0657   WBC Thousand/uL 15 73* 9 07   HEMOGLOBIN g/dL 10 8* 11 1*   HEMATOCRIT % 32 0* 33 3*   PLATELETS Thousands/uL 341 403*   NEUTROS PCT %  --  55   LYMPHS PCT %  --  26   MONOS PCT %  --  12   EOS PCT %  --  5     Results from last 7 days   Lab Units 20  0447   SODIUM mmol/L 138   POTASSIUM mmol/L 3 1*   CHLORIDE mmol/L 106   CO2 mmol/L 22   BUN mg/dL 31*   CREATININE mg/dL 2 03*   ANION GAP mmol/L 10   CALCIUM mg/dL 7 9*   GLUCOSE RANDOM mg/dL 194*         Results from last 7 days   Lab Units 09/07/20  0704 09/06/20  2131 09/06/20  1516 09/06/20  1107 09/06/20  0703 09/05/20  2203 09/05/20  1621 09/05/20  0615 09/04/20  2154 09/04/20  1536 09/04/20  0833 09/03/20  2220   POC GLUCOSE mg/dl 203* 250* 181* 239* 132 155* 187* 172* 214* 190* 182* 164*     * I Have Reviewed All Lab Data Listed Above  * Additional Pertinent Lab Tests Reviewed: Pradeep 66 Admission Reviewed    Imaging:    Imaging Reports Reviewed Today Include:   Imaging Personally Reviewed by Myself Includes:      Recent Cultures (last 7 days):     Results from last 7 days   Lab Units 09/01/20  0703 09/01/20  0657   BLOOD CULTURE  No Growth After 5 Days  No Growth After 5 Days         Last 24 Hours Medication List:   Current Facility-Administered Medications   Medication Dose Route Frequency Provider Last Rate    acetaminophen  975 mg Oral Q8H Deirdre Hunter MD      aluminum-magnesium hydroxide-simethicone  30 mL Oral Q6H PRN [facility], DPM      aspirin  81 mg Oral Daily [facility]      atorvastatin  40 mg Oral Daily With [employer], DPMIKE      clopidogrel  75 mg Oral Daily [facility]      docusate sodium  100 mg Oral BID Judith Medel MD      enoxaparin  40 mg Subcutaneous Q24H [address] Judith Medel MD      insulin glargine  30 Units Subcutaneous HS Judith Medel MD      insulin glargine  5 Units Subcutaneous Once Judith Medel MD      insulin lispro  1-5 Units Subcutaneous 4x Daily (AC & HS) [facility], D[facility]      insulin lispro  15 Units Subcutaneous TID With [facility], D[facility]      LORazepam  0 5 mg Oral BID PRN [facility], DPM      methimazole  5 mg Oral Daily [facility], DPM      ondansetron  4 mg Intravenous Q6H PRN [facility], DPM      oxyCODONE  10 mg Oral Q6H PRN Judith Medle MD      oxyCODONE  5 mg Oral Q6H PRN Judith Medel MD     [facility] pantoprazole  20 mg Oral Early Morning [facility], DPM      polyethylene glycol  17 g Oral Daily PRN [facility], DPM      sodium chloride  100 mL/hr Intravenous Continuous [facility],  mL/hr (09/06/20 1424)    tamsulosin  0 4 mg Oral Daily With Zachariah Dela Cruz MD      vancomycin  10 mg/kg (Adjusted) Intravenous Daily PRN Lucinda Grubbs MD      verapamil  240 mg Oral Daily [facility], DPM      zolpidem  10 mg Oral HS PRN [facility], DPM          Today, Patient Was Seen By: Lucinda Grubbs MD    ** Please Note: Dictation voice to text software may have been used in the creation of this document   **

## 2020-09-07 NOTE — ASSESSMENT & PLAN NOTE
Lab Results   Component Value Date    HGBA1C 8 1 (H) 08/28/2020       Recent Labs     09/06/20  1107 09/06/20  1516 09/06/20  2131 09/07/20  0704   POCGLU 239* 181* 250* 203*       Blood Sugar Average: Last 72 hrs:  · (P) 262 [id number]     Uncontrolled  Hemoglobin A1c 8 1    Blood sugars elevated today  Will adjust the dose of Lantus   Continue to monitor sugar

## 2020-09-08 ENCOUNTER — TELEPHONE (OUTPATIENT)
Dept: INTERVENTIONAL RADIOLOGY/VASCULAR | Facility: HOSPITAL | Age: 55
End: 2020-09-08

## 2020-09-08 ENCOUNTER — APPOINTMENT (INPATIENT)
Dept: ULTRASOUND IMAGING | Facility: HOSPITAL | Age: 55
DRG: 474 | End: 2020-09-08
Payer: COMMERCIAL

## 2020-09-08 ENCOUNTER — APPOINTMENT (INPATIENT)
Dept: INTERVENTIONAL RADIOLOGY/VASCULAR | Facility: HOSPITAL | Age: 55
DRG: 474 | End: 2020-09-08
Attending: RADIOLOGY
Payer: COMMERCIAL

## 2020-09-08 PROBLEM — R33.9 URINARY RETENTION: Status: ACTIVE | Noted: 2020-09-08

## 2020-09-08 LAB
ANION GAP SERPL CALCULATED.3IONS-SCNC: 13 MMOL/L (ref 4–13)
BUN SERPL-MCNC: 32 MG/DL (ref 5–25)
CALCIUM SERPL-MCNC: 8.8 MG/DL (ref 8.3–10.1)
CHLORIDE SERPL-SCNC: 104 MMOL/L (ref 100–108)
CO2 SERPL-SCNC: 22 MMOL/L (ref 21–32)
CREAT SERPL-MCNC: 2.17 MG/DL (ref 0.6–1.3)
CREAT UR-MCNC: 44.7 MG/DL
ERYTHROCYTE [DISTWIDTH] IN BLOOD BY AUTOMATED COUNT: 12.1 % (ref 11.6–15.1)
GFR SERPL CREATININE-BSD FRML MDRD: 33 ML/MIN/1.73SQ M
GLUCOSE SERPL-MCNC: 103 MG/DL (ref 65–140)
GLUCOSE SERPL-MCNC: 134 MG/DL (ref 65–140)
GLUCOSE SERPL-MCNC: 154 MG/DL (ref 65–140)
HCT VFR BLD AUTO: 29.8 % (ref 36.5–49.3)
HGB BLD-MCNC: 10.1 G/DL (ref 12–17)
MCH RBC QN AUTO: 30.1 PG (ref 26.8–34.3)
MCHC RBC AUTO-ENTMCNC: 33.9 G/DL (ref 31.4–37.4)
MCV RBC AUTO: 89 FL (ref 82–98)
MICROALBUMIN UR-MCNC: 77 MG/L (ref 0–20)
MICROALBUMIN/CREAT 24H UR: 172 MG/G CREATININE (ref 0–30)
PLATELET # BLD AUTO: 322 THOUSANDS/UL (ref 149–390)
PMV BLD AUTO: 10.2 FL (ref 8.9–12.7)
POTASSIUM SERPL-SCNC: 3.5 MMOL/L (ref 3.5–5.3)
RBC # BLD AUTO: 3.35 MILLION/UL (ref 3.88–5.62)
SODIUM 24H UR-SCNC: 158 MOL/L
SODIUM SERPL-SCNC: 139 MMOL/L (ref 136–145)
UUN 24H UR-MCNC: 242 MG/DL
VANCOMYCIN SERPL-MCNC: 14.9 UG/ML
WBC # BLD AUTO: 11.44 THOUSAND/UL (ref 4.31–10.16)

## 2020-09-08 PROCEDURE — 99152 MOD SED SAME PHYS/QHP 5/>YRS: CPT | Performed by: RADIOLOGY

## 2020-09-08 PROCEDURE — 84540 ASSAY OF URINE/UREA-N: CPT | Performed by: INTERNAL MEDICINE

## 2020-09-08 PROCEDURE — 76937 US GUIDE VASCULAR ACCESS: CPT | Performed by: RADIOLOGY

## 2020-09-08 PROCEDURE — C1751 CATH, INF, PER/CENT/MIDLINE: HCPCS

## 2020-09-08 PROCEDURE — 85027 COMPLETE CBC AUTOMATED: CPT | Performed by: INTERNAL MEDICINE

## 2020-09-08 PROCEDURE — 36558 INSERT TUNNELED CV CATH: CPT

## 2020-09-08 PROCEDURE — 82570 ASSAY OF URINE CREATININE: CPT | Performed by: INTERNAL MEDICINE

## 2020-09-08 PROCEDURE — B513ZZA FLUOROSCOPY OF RIGHT JUGULAR VEINS, GUIDANCE: ICD-10-PCS | Performed by: RADIOLOGY

## 2020-09-08 PROCEDURE — 82043 UR ALBUMIN QUANTITATIVE: CPT | Performed by: INTERNAL MEDICINE

## 2020-09-08 PROCEDURE — 80048 BASIC METABOLIC PNL TOTAL CA: CPT | Performed by: INTERNAL MEDICINE

## 2020-09-08 PROCEDURE — 82948 REAGENT STRIP/BLOOD GLUCOSE: CPT

## 2020-09-08 PROCEDURE — 02HV33Z INSERTION OF INFUSION DEVICE INTO SUPERIOR VENA CAVA, PERCUTANEOUS APPROACH: ICD-10-PCS | Performed by: RADIOLOGY

## 2020-09-08 PROCEDURE — 0T9B70Z DRAINAGE OF BLADDER WITH DRAINAGE DEVICE, VIA NATURAL OR ARTIFICIAL OPENING: ICD-10-PCS | Performed by: INTERNAL MEDICINE

## 2020-09-08 PROCEDURE — 80202 ASSAY OF VANCOMYCIN: CPT | Performed by: INTERNAL MEDICINE

## 2020-09-08 PROCEDURE — 36558 INSERT TUNNELED CV CATH: CPT | Performed by: RADIOLOGY

## 2020-09-08 PROCEDURE — 77001 FLUOROGUIDE FOR VEIN DEVICE: CPT

## 2020-09-08 PROCEDURE — 76770 US EXAM ABDO BACK WALL COMP: CPT

## 2020-09-08 PROCEDURE — 99255 IP/OBS CONSLTJ NEW/EST HI 80: CPT | Performed by: INTERNAL MEDICINE

## 2020-09-08 PROCEDURE — 99233 SBSQ HOSP IP/OBS HIGH 50: CPT | Performed by: INTERNAL MEDICINE

## 2020-09-08 PROCEDURE — 84300 ASSAY OF URINE SODIUM: CPT | Performed by: INTERNAL MEDICINE

## 2020-09-08 PROCEDURE — 0JH63XZ INSERTION OF TUNNELED VASCULAR ACCESS DEVICE INTO CHEST SUBCUTANEOUS TISSUE AND FASCIA, PERCUTANEOUS APPROACH: ICD-10-PCS | Performed by: RADIOLOGY

## 2020-09-08 PROCEDURE — 77001 FLUOROGUIDE FOR VEIN DEVICE: CPT | Performed by: RADIOLOGY

## 2020-09-08 PROCEDURE — 3060F POS MICROALBUMINURIA REV: CPT | Performed by: FAMILY MEDICINE

## 2020-09-08 PROCEDURE — 76937 US GUIDE VASCULAR ACCESS: CPT

## 2020-09-08 PROCEDURE — 99232 SBSQ HOSP IP/OBS MODERATE 35: CPT | Performed by: NURSE PRACTITIONER

## 2020-09-08 PROCEDURE — C1769 GUIDE WIRE: HCPCS

## 2020-09-08 RX ORDER — LIDOCAINE HYDROCHLORIDE AND EPINEPHRINE 10; 10 MG/ML; UG/ML
INJECTION, SOLUTION INFILTRATION; PERINEURAL CODE/TRAUMA/SEDATION MEDICATION
Status: COMPLETED | OUTPATIENT
Start: 2020-09-08 | End: 2020-09-08

## 2020-09-08 RX ORDER — MIDAZOLAM HYDROCHLORIDE 2 MG/2ML
INJECTION, SOLUTION INTRAMUSCULAR; INTRAVENOUS CODE/TRAUMA/SEDATION MEDICATION
Status: COMPLETED | OUTPATIENT
Start: 2020-09-08 | End: 2020-09-08

## 2020-09-08 RX ORDER — FENTANYL CITRATE 50 UG/ML
INJECTION, SOLUTION INTRAMUSCULAR; INTRAVENOUS CODE/TRAUMA/SEDATION MEDICATION
Status: COMPLETED | OUTPATIENT
Start: 2020-09-08 | End: 2020-09-08

## 2020-09-08 RX ORDER — HEPARIN SODIUM 5000 [USP'U]/ML
5000 INJECTION, SOLUTION INTRAVENOUS; SUBCUTANEOUS EVERY 8 HOURS SCHEDULED
Status: DISCONTINUED | OUTPATIENT
Start: 2020-09-09 | End: 2020-09-21 | Stop reason: HOSPADM

## 2020-09-08 RX ADMIN — DOCUSATE SODIUM 100 MG: 100 CAPSULE, LIQUID FILLED ORAL at 09:05

## 2020-09-08 RX ADMIN — ASPIRIN 81 MG 81 MG: 81 TABLET ORAL at 09:05

## 2020-09-08 RX ADMIN — VERAPAMIL HYDROCHLORIDE 240 MG: 120 TABLET, FILM COATED, EXTENDED RELEASE ORAL at 09:05

## 2020-09-08 RX ADMIN — VANCOMYCIN HYDROCHLORIDE 1250 MG: 5 INJECTION, POWDER, LYOPHILIZED, FOR SOLUTION INTRAVENOUS at 22:53

## 2020-09-08 RX ADMIN — ATORVASTATIN CALCIUM 40 MG: 40 TABLET, FILM COATED ORAL at 16:23

## 2020-09-08 RX ADMIN — ENOXAPARIN SODIUM 40 MG: 40 INJECTION SUBCUTANEOUS at 09:05

## 2020-09-08 RX ADMIN — INSULIN LISPRO 15 UNITS: 100 INJECTION, SOLUTION INTRAVENOUS; SUBCUTANEOUS at 17:55

## 2020-09-08 RX ADMIN — LIDOCAINE HYDROCHLORIDE,EPINEPHRINE BITARTRATE 5 ML: 10; .01 INJECTION, SOLUTION INFILTRATION; PERINEURAL at 14:59

## 2020-09-08 RX ADMIN — MIDAZOLAM HYDROCHLORIDE 1 MG: 1 INJECTION, SOLUTION INTRAMUSCULAR; INTRAVENOUS at 14:53

## 2020-09-08 RX ADMIN — DOCUSATE SODIUM 100 MG: 100 CAPSULE, LIQUID FILLED ORAL at 17:54

## 2020-09-08 RX ADMIN — FENTANYL CITRATE 50 MCG: 50 INJECTION, SOLUTION INTRAMUSCULAR; INTRAVENOUS at 14:45

## 2020-09-08 RX ADMIN — SODIUM CHLORIDE 100 ML/HR: 0.9 INJECTION, SOLUTION INTRAVENOUS at 09:06

## 2020-09-08 RX ADMIN — CLOPIDOGREL BISULFATE 75 MG: 75 TABLET ORAL at 09:05

## 2020-09-08 RX ADMIN — FENTANYL CITRATE 50 MCG: 50 INJECTION, SOLUTION INTRAMUSCULAR; INTRAVENOUS at 14:53

## 2020-09-08 RX ADMIN — PANTOPRAZOLE SODIUM 20 MG: 20 TABLET, DELAYED RELEASE ORAL at 06:41

## 2020-09-08 RX ADMIN — TAMSULOSIN HYDROCHLORIDE 0.4 MG: 0.4 CAPSULE ORAL at 16:23

## 2020-09-08 RX ADMIN — MIDAZOLAM HYDROCHLORIDE 1 MG: 1 INJECTION, SOLUTION INTRAMUSCULAR; INTRAVENOUS at 14:45

## 2020-09-08 RX ADMIN — METHIMAZOLE 5 MG: 5 TABLET ORAL at 09:05

## 2020-09-08 RX ADMIN — VANCOMYCIN HYDROCHLORIDE 1000 MG: 1 INJECTION, SOLUTION INTRAVENOUS at 11:42

## 2020-09-08 NOTE — ASSESSMENT & PLAN NOTE
Required 3 straight straight catheterization,yielded 500 - 700ml urine  Mae inserted today per protocol    Started Flomax  Patient denies constipation  Consult Urology

## 2020-09-08 NOTE — PLAN OF CARE
Problem: Potential for Falls  Goal: Patient will remain free of falls  Description: INTERVENTIONS:  - Assess patient frequently for physical needs  -  Identify cognitive and physical deficits and behaviors that affect risk of falls    -  Isom fall precautions as indicated by assessment   - Educate patient/family on patient safety including physical limitations  - Instruct patient to call for assistance with activity based on assessment  - Modify environment to reduce risk of injury  - Consider OT/PT consult to assist with strengthening/mobility  Outcome: Progressing

## 2020-09-08 NOTE — PROGRESS NOTES
Progress Note - Infectious Disease   Garland Moffett 60 y o  male MRN: [id number]  Unit/Bed#: -01 Encounter: [id number]      A/P:  1  MRSA bacteremia   Secondary to #2/3   Negative 2D echo   No intravascular prosthetic devices   Repeat blood culture are negative      -continue IV vancomycin with dosing recommendations per pharmacy  Vancomycin level remains at goal   -plan for 4 week course of IV antibiotics from negative blood culture, through 9/27   -check weekly CBC with diff, creatinine, vancomycin level  -outpatient ID follow-up after hospital discharge  -okay to proceed with line placement at any time     2  Right foot cellulitis   Suspect secondary to MRSA   Wound culture also shows Morganella, Enterobacter, Enterococcus, which is not unexpected finding in the setting of chronic ulceration   Would continue targeted therapy for MRSA infection   Now status post TMA     -antibiotic plan as above  -serial foot exams     3  Right foot nonhealing ulceration   With residual 1st and 2nd toe osteomyelitis   Podiatry input noted  Now s/p TMA on 09/03      -antibiotic plan as above  -daily local wound care and dressing change  -outpatient podiatry follow-up     4  Status post 1st digit amputation on 08/11/2020   Complicated by above  5  Acute kidney injury  Creatinine has trended up and remains stable around 2, with baseline around 0 8-0 9  Nephrology input noted with lower suspicion for vancomycin toxicity  Consider secondary to urinary retention     -dose adjust antibiotic based on renal function  -plan for Mae catheter  -follow-up renal ultrasound  -monitor BMP closely  -nephrology follow-up ongoing     6  PAD   Status post recent revascularization   Vascular surgery input noted      7  Uncontrolled diabetes mellitus   Risk factor for severe infection      8  Leukocytosis  Likely reactive in the immediate postoperative setting  Remains afebrile  Blood cultures showed clearance of bacteremia    WBC count has come down      -antibiotic plan as above  -monitor CBC  -monitor temperatures     Vancomycin  POD5        Subjective:  No fevers, chills  Tolerating oral intake  He has been retaining urine requiring Mae catheter placement  Objective:  Vitals:  Temp:  [98 3 °F (36 8 °C)-98 5 °F (36 9 °C)] 98 3 °F (36 8 °C)  HR:  [71-76] 76  Resp:  [16-18] 18  BP: (117-157)/(70-88) 151/88  SpO2:  [96 %-99 %] 99 %  Temp (24hrs), Av 4 °F (36 9 °C), Min:98 3 °F (36 8 °C), Max:98 5 °F (36 9 °C)  Current: Temperature: 98 3 °F (36 8 °C)    Physical Exam:   General:  No acute distress  Psychiatric:  Awake and alert  Pulmonary:  Normal respiratory excursion without accessory muscle use  Abdomen:  Soft, nontender  Extremities:  No edema  Foot dressing intact with no ascending erythema  Skin:  No rashes    Lab Results:  I have personally reviewed pertinent labs  Results from last 7 days   Lab Units 20  0648 20  0447 20  0657   POTASSIUM mmol/L 3 5 3 1* 3 5   CHLORIDE mmol/L 104 106 101   CO2 mmol/L 22 22 24   BUN mg/dL 32* 31* 34*   CREATININE mg/dL 2 17* 2 03* 2 32*   EGFR ml/min/1 73sq m 33 35 30   CALCIUM mg/dL 8 8 7 9* 9 0     Results from last 7 days   Lab Units 20  0648 20  0611   WBC Thousand/uL 11 44* 15 73*   HEMOGLOBIN g/dL 10 1* 10 8*   PLATELETS Thousands/uL 322 341           Imaging Studies:   I have personally reviewed pertinent imaging study reports and images in PACS  EKG, Pathology, and Other Studies:   I have personally reviewed pertinent reports

## 2020-09-08 NOTE — BRIEF OP NOTE (RAD/CATH)
INTERVENTIONAL RADIOLOGY PROCEDURE NOTE    Date: 9/8/2020    Procedure: Procedure name not found  Preoperative diagnosis:   1  Cellulitis of right foot    2  Hyperthyroidism    3  Thyroid cancer ([facility] [address] )    4  MRSA bacteremia    [facility]  [facility]    6  Urinary retention         Postoperative diagnosis: Same  Surgeon: Marbin Liu MD     Assistant: None  No qualified resident was available  Blood loss:  Minimal    Specimens:  None     Findings:  Successful tunnel double-lumen central infusion catheter placement through the right internal jugular vein  Ready for immediate use  Complications: None immediate      Anesthesia: conscious sedation

## 2020-09-08 NOTE — PROGRESS NOTES
Vancomycin Assessment    Montana Adamson is a 60 y o  male who is currently receiving vancomycin 1000 mg IV PRN vanco level < 20 for MRSA suspected bone/joint infection   Relevant clinical data and objective history reviewed:  Creatinine   Date Value Ref Range Status   09/08/2020 2 17 (H) 0 60 - 1 30 mg/dL Final     Comment:     Standardized to IDMS reference method   09/07/2020 2 03 (H) 0 60 - 1 30 mg/dL Final     Comment:     Standardized to IDMS reference method   09/06/2020 2 32 (H) 0 60 - 1 30 mg/dL Final     Comment:     Standardized to IDMS reference method     Vancomycin Rm   Date Value Ref Range Status   09/08/2020 14 9 ug/mL Final     /81 (BP Location: Right arm)   Pulse 74   Temp 98 3 °F (36 8 °C) (Oral)   Resp 16   Ht 6' 4" (1 93 m)   Wt 115 kg (253 lb 12 oz)   SpO2 96%   BMI 30 89 kg/m²   I/O last 3 completed shifts: In: 2195 [P O :270; I V :1925]  Out: 3258 [Urine:3258]  Lab Results   Component Value Date/Time    BUN 32 (H) 09/08/2020 06:48 AM    WBC 11 44 (H) 09/08/2020 06:48 AM    HGB 10 1 (L) 09/08/2020 06:48 AM    HCT 29 8 (L) 09/08/2020 06:48 AM    MCV 89 09/08/2020 06:48 AM     09/08/2020 06:48 AM     Temp Readings from Last 3 Encounters:   09/07/20 98 3 °F (36 8 °C) (Oral)   08/20/20 (!) 97 3 °F (36 3 °C) (Tympanic)   08/12/20 98 5 °F (36 9 °C)     Vancomycin Days of Therapy: 12    Assessment/Plan  The patient is currently on vancomycin utilizing pulse dosing  Baseline risks associated with therapy include: concomitant nephrotoxic medications and dehydration  The patient is receiving 1000 mg IV PRN vanco level < 20 with the most recent vancomycin level being at steady-state and therapeutic based on a goal of 15-20 (appropriate for most indications) ; therefore, after clinical evaluation will be changed to 1250 mg IV q 12 H   Pharmacy will continue to follow closely for s/sx of nephrotoxicity, infusion reactions, and appropriateness of therapy    BMP and CBC will be ordered per protocol  Plan for trough as patient approaches steady state, prior to the 4th  dose at approximately 1100 on 9/10/2020  Pharmacy will continue to follow the patients culture results and clinical progress daily      Yehuda Washington

## 2020-09-08 NOTE — PROGRESS NOTES
Progress Note - Roma Soto 1965, 60 y o  male MRN: [id number]    Unit/Bed#: -01 Encounter: [id number]    Primary Care Provider: Trev Wang MD   Date and time admitted to hospital: 8/28/2020  3:14 PM        * Osteomyelitis [facility])  Assessment & Plan  Recent history of right hallux amputation  Presented with wound dehiscence, wound cellulitis, possible osteomyelitis  2/2 blood culture from 08/28/2020 growing MRSA  Right foot MRI report reviewed and noted for osteomyelitis  Podiatry recommendations appreciated  Pod # 5 status post right-sided TMA  Continue IV vancomycin for total of 4 weeks through 9/27  Due to patient's worsening creatinine order for central line by IR was placed insert of PICC line in an attempt to preserve the arm in case patient's creatinine deteriorates and were to need dialysis  Pending today        Currently afebrile, hemodynamically stable  Encourage for strict nonweightbearing to right lower extremity at this time  Continue dressing changes per podiatrist recommendation  MRSA bacteremia  Assessment & Plan  2/2 culture from 08/28/2020 growing MRSA  Likely source foot wound  Negative 2D echo  Repeat blood cultures remain negative  Continue IV vancomycin total of 4 weeks through 9/27  Weekly CBC with diff, creatinine, vancomycin level  Outpatient ID follow-up after hospital discharge  Vascular surgery, Podiatry following  ID recommendations appreciated  Pharmacy consult for vancomycin management  Acute kidney injury (MARKELL) with acute tubular necrosis (ATN) (HCC)  Assessment & Plan  Likely secondary to sepsis/surgery,+/- urinary retention  Creatinine peaked to 2 32 on 9/5  Baseline appears to be 0 8-0 9  Creatinine 2 17 today  Patient required 3 straight catheterization,Mae inserted per protocol today  Started Flomax   Continue IV fluids  Avoid nephrotoxin and hypotension  Consulted Nephrology, appreciate input  Renal ultrasound pending    Repeat BMP in a m     Urinary retention  Assessment & Plan  Required 3 straight straight catheterization,yielded 500 - 700ml urine  Mae inserted today per protocol  Started Flomax  Patient denies constipation  Consult Urology    PAD (peripheral artery disease) [facility])  Assessment & Plan  Patient history of recent right popliteal PTA/ stent 08/06/2020 and right hallux amputation  Now noted with osteomyelitis as evident on right foot MRI report  Cleared by vascular surgery for further debridement and/or amputation  Continue aspirin, statin, Plavix  Follow-up with vascular surgery recommendation as well as podiatry following  Amputation of right great toe [facility])  Assessment & Plan  Now presented again with poor wound healing, wound dehiscence, possible cellulitis, osteomyelitis  Plan is as stated above under 1  And 2  Hyperthyroidism  Assessment & Plan  · Diagnosed with thyroid cancer in July  · TSH 0 080  · Free T4 1 39  · Continue home dose of methimazole 5 mg daily  · Recommended close outpatient follow-up  Type 2 diabetes mellitus with diabetic polyneuropathy, with long-term current use of insulin [facility])  Assessment & Plan  Lab Results   Component Value Date    HGBA1C 8 1 (H) 08/28/2020       Recent Labs     09/07/20  1629 09/07/20  2042 09/08/20  0647 09/08/20  1304   POCGLU 127 109 103 154*       Blood Sugar Average: Last 72 hrs:  · (P) 900 [id number]     Uncontrolled  Hemoglobin A1c 8 1  Blood sugars improving  Continue Lantus 30 units subcu HS, Humalog 15 units t i d  Continue SSI  Continue to monitor sugar        Hyperlipidemia  Assessment & Plan  · Continue statin    Gastroesophageal reflux disease without esophagitis  Assessment & Plan  · Continue PPI    Hypertension, essential  Assessment & Plan  · Controlled  · Continue verapamil   Avalide held secondary to acute kidney injury  · Continue to monitor blood pressure closely  · BP stable           VTE Pharmacologic Prophylaxis:   Pharmacologic: Heparin  Mechanical VTE Prophylaxis in Place: No    Patient Centered Rounds: I have performed bedside rounds with nursing staff today  Discussions with Specialists or Other Care Team Provider: renal    Education and Discussions with Family / Patient: yes    Time Spent for Care: 20 minutes  More than 50% of total time spent on counseling and coordination of care as described above  Current Length of Stay: 11 day(s)    Current Patient Status: Inpatient   Certification Statement: The patient will continue to require additional inpatient hospital stay due to MRSA bacteremia, right foot osteo, MARKELL    Discharge Plan:  Short-term rehab once medically cleared    Code Status: Level 1 - Full Code      Subjective:   Patient reports difficulty emptying bladder  Denies constipation  Denies fever, chills  No other complaints  Objective:     Vitals:   Temp (24hrs), Av 4 °F (36 9 °C), Min:98 3 °F (36 8 °C), Max:98 5 °F (36 9 °C)    Temp:  [98 3 °F (36 8 °C)-98 5 °F (36 9 °C)] 98 3 °F (36 8 °C)  HR:  [66-76] 66  Resp:  [16-18] 18  BP: (117-157)/(70-88) 142/85  SpO2:  [96 %-99 %] 96 %  Body mass index is 30 89 kg/m²  Input and Output Summary (last 24 hours): Intake/Output Summary (Last 24 hours) at 2020 1503  Last data filed at 2020 1301  Gross per 24 hour   Intake 2200 ml   Output 2742 ml   Net -542 ml       Physical Exam:     Physical Exam  Vitals signs and nursing note reviewed  Constitutional:       Appearance: He is well-developed  HENT:      Head: Normocephalic and atraumatic  Neck:      Musculoskeletal: Neck supple  Thyroid: No thyromegaly  Vascular: No JVD  Trachea: No tracheal deviation  Cardiovascular:      Rate and Rhythm: Normal rate and regular rhythm  Heart sounds: Normal heart sounds  No murmur  Pulmonary:      Effort: Pulmonary effort is normal  No respiratory distress  Breath sounds: Normal breath sounds  No wheezing or rales     Abdominal: General: Bowel sounds are normal  There is no distension  Palpations: Abdomen is soft  Tenderness: There is no abdominal tenderness  There is no guarding  Musculoskeletal:         General: Deformity present  No swelling  Right lower leg: No edema  Left lower leg: No edema  Comments: Status post right TMA  Dressing intact  Skin:     General: Skin is warm and dry  Neurological:      General: No focal deficit present  Mental Status: He is alert and oriented to person, place, and time  Psychiatric:         Judgment: Judgment normal          Additional Data:     Labs:    Results from last 7 days   Lab Units 09/08/20  0648   WBC Thousand/uL 11 44*   HEMOGLOBIN g/dL 10 1*   HEMATOCRIT % 29 8*   PLATELETS Thousands/uL 322     Results from last 7 days   Lab Units 09/08/20  0648   POTASSIUM mmol/L 3 5   CHLORIDE mmol/L 104   CO2 mmol/L 22   BUN mg/dL 32*   CREATININE mg/dL 2 17*   CALCIUM mg/dL 8 8           * I Have Reviewed All Lab Data Listed Above  * Additional Pertinent Lab Tests Reviewed:  Pradeep Pena Admission Reviewed    Imaging:    Imaging Reports Reviewed Today Include:  MRI right foot  Imaging Personally Reviewed by Myself Includes:  None    Recent Cultures (last 7 days):           Last 24 Hours Medication List:   Current Facility-Administered Medications   Medication Dose Route Frequency Provider Last Rate    acetaminophen  975 mg Oral Q8H Samia Kramer MD      aluminum-magnesium hydroxide-simethicone  30 mL Oral Q6H PRN [facility], DP      aspirin  81 mg Oral Daily [place], [place]      atorvastatin  40 mg Oral Daily With [employer], DPM      clopidogrel  75 mg Oral Daily [place], [place]      docusate sodium  100 mg Oral BID Sunni Lainez MD      enoxaparin  40 mg Subcutaneous Q24H Samia Kramer MD      fentanyl citrate (PF)   Intravenous Code/Trauma/Sedation Med Wei Pepe MD      insulin glargine  30 Units Subcutaneous HS Nahomi Sam MD      insulin lispro  1-5 Units Subcutaneous 4x Daily (AC & HS) [facility], DPM      insulin lispro  15 Units Subcutaneous TID With Meals [facility], DPM      lidocaine-epinephrine    Code/Trauma/Sedation Med Raghu Mazariegos MD      LORazepam  0 5 mg Oral BID PRN [facility], DPM      methimazole  5 mg Oral Daily [place], [place]      midazolam    Code/Trauma/Sedation Med Raghu Mazariegos MD      ondansetron  4 mg Intravenous Q6H PRN [facility], DPM      oxyCODONE  10 mg Oral Q6H PRN Nahomi Sam MD      oxyCODONE  5 mg Oral Q6H PRN Nahomi Sam MD      pantoprazole  20 mg Oral Early Morning [facility], DPM      polyethylene glycol  17 g Oral Daily PRN [facility], DPM      sodium chloride  100 mL/hr Intravenous Continuous [facility],  mL/hr (09/08/20 5900)    tamsulosin  0 4 mg Oral Daily With Roshan Reyez MD      vancomycin  12 5 mg/kg (Adjusted) Intravenous Q12H NAILA Suarez      verapamil  240 mg Oral Daily [place], [place]      zolpidem  10 mg Oral HS PRN [facility], DPM          Today, Patient Was Seen By: NAILA Spivey    ** Please Note: Dragon 360 Dictation voice to text software may have been used in the creation of this document   **

## 2020-09-08 NOTE — ASSESSMENT & PLAN NOTE
Likely secondary to sepsis/surgery,+/- urinary retention  Creatinine peaked to 2 32 on 9/5  Baseline appears to be 0 8-0 9  Creatinine 2 17 today  Patient required 3 straight catheterization,Mae inserted per protocol today  Started Flomax   Continue IV fluids  Avoid nephrotoxin and hypotension  Consulted Nephrology, appreciate input  Renal ultrasound pending  Repeat BMP in a m

## 2020-09-08 NOTE — ASSESSMENT & PLAN NOTE
2/2 culture from 08/28/2020 growing MRSA  Likely source foot wound  Negative 2D echo  Repeat blood cultures remain negative  Continue IV vancomycin total of 4 weeks through 9/27  Weekly CBC with diff, creatinine, vancomycin level  Outpatient ID follow-up after hospital discharge  Vascular surgery, Podiatry following  ID recommendations appreciated  Pharmacy consult for vancomycin management

## 2020-09-08 NOTE — ASSESSMENT & PLAN NOTE
· Controlled  · Continue verapamil   Avalide held secondary to acute kidney injury  · Continue to monitor blood pressure closely  · BP stable

## 2020-09-08 NOTE — ASSESSMENT & PLAN NOTE
Recent history of right hallux amputation  Presented with wound dehiscence, wound cellulitis, possible osteomyelitis  2/2 blood culture from 08/28/2020 growing MRSA  Right foot MRI report reviewed and noted for osteomyelitis  Podiatry recommendations appreciated  Pod # 5 status post right-sided TMA  Continue IV vancomycin for total of 4 weeks through 9/27  Due to patient's worsening creatinine order for central line by IR was placed insert of PICC line in an attempt to preserve the arm in case patient's creatinine deteriorates and were to need dialysis  Pending today        Currently afebrile, hemodynamically stable  Encourage for strict nonweightbearing to right lower extremity at this time  Continue dressing changes per podiatrist recommendation

## 2020-09-08 NOTE — CASE MANAGEMENT
Jamarcus met with the pt at bedside to discuss dcp  The pt states that he is agreeable to going to rehab  He has requested that the location be near the [facility] area  He states that the facility would need to be in network with his insurance  CM sent referrals and will provide the pt with the names of the accepting facilities

## 2020-09-08 NOTE — CONSULTS
NEPHROLOGY CONSULTATION NOTE    Patient: Eligio Morgan               Sex: male          DOA: 8/28/2020  3:14 PM   Date of Birth: @        Age: @        LOS:  LOS: 6 days     REFERRING PHYSICIAN: Eva Corrales [id number] [address] / CONSULTATION:  Further management of MARKELL    DATE OF CONSULTATION / SERVICE: 9/8/2020    ADMISSION DIAGNOSIS: Osteomyelitis ([facility] [address] )     CHIEF COMPLAINT     I have infection in my foot    HPI     This is 61-year-old male was initially presented for right foot infection  During the hospital stay patient had developed worsening renal function and Nephrology were consulted for further management of MARKELL  Upon review of old medical records, previously known baseline creatinine was around 0 8-0 9  Patient had developed MARKELL for last few days and renal function has failed to improve with current creatinine of 2 17 and Nephrology were consulted for further evaluation  Serial bladder scans for last few days showing urinary retention and patient had straight urinary catheterization perform 3 X in last 24 hours  Patient was admitted with right toe infection and had underwent amputation  Patient was also found to have MRSA bacteremia during the hospital stay and currently been followed by infectious disease and also been receiving IV vancomycin  Few days back patient was also found to have supratherapeutic vancomycin level but after dose adjustment, vancomycin level has improved with last known random vancomycin level of 14 9  Patient also have underlying diabetes type 2 and glucose levels are acceptable with the use of insulin  Currently patient denies nausea, vomiting, headache, dizziness, abdominal pain, constipation or rash      PAST MEDICAL HISTORY     Past Medical History:   Diagnosis Date    Diabetes mellitus ([facility] [address] )     Hyperlipidemia     Hypertension     Thyroid cancer ([facility] [address] )        PAST SURGICAL HISTORY     Past Surgical History:   Procedure Laterality Date  FOOT SURGERY Right 2014    IR AORTAGRAM WITH RUN-OFF  8/6/2020    MN AMPUTATION FOOT,TRANSMETATARSAL Right 9/3/2020    Procedure: AMPUTATION TRANSMETATARSAL (TMA);  Surgeon: Shekhar Mari DPM;  Location: [facility];  Service: Podiatry    TOE AMPUTATION Right 8/11/2020    Procedure: AMPUTATION TOE;  Surgeon: Shekhar Mari DPM;  Location: [facility];  Service: [address] THYROID BIOPSY  7/2/2020       ALLERGIES     No Known Allergies    SOCIAL HISTORY     Social History     Substance and Sexual Activity   Alcohol Use Not Currently    Alcohol/week: 8 0 standard drinks    Types: 8 Cans of beer per week    Comment: last drink was 10 days ago     Social History     Substance and Sexual Activity   Drug Use No    Types: Marijuana     Social History     Tobacco Use   Smoking Status Never Smoker   Smokeless Tobacco Never Used       FAMILY HISTORY     Family History   Problem Relation Age of Onset    Cancer Mother     Hypertension Father        CURRENT MEDICATIONS       Current Facility-Administered Medications:     acetaminophen (TYLENOL) tablet 975 mg, 975 mg, Oral, Q8H [facility], Lalo Thornton MD    aluminum-magnesium hydroxide-simethicone (MYLANTA) 200-200-20 mg/5 mL oral suspension 30 mL, 30 mL, Oral, Q6H PRN, Shekhar Mari DPM    aspirin chewable tablet 81 mg, 81 mg, Oral, Daily, Shekhar Mari DPM, 81 mg at 09/08/20 0905    atorvastatin (LIPITOR) tablet 40 mg, 40 mg, Oral, Daily With Mily uHa DPM, 40 mg at 09/07/20 1534    clopidogrel (PLAVIX) tablet 75 mg, 75 mg, Oral, Daily, Shekhar Mari DPM, 75 mg at 09/08/20 0905    docusate sodium (COLACE) capsule 100 mg, 100 mg, Oral, BID, Willem Greco MD, 100 mg at 09/08/20 0905    enoxaparin (LOVENOX) subcutaneous injection 40 mg, 40 mg, Subcutaneous, Q24H [facility], Willem Greco MD, 40 mg at 09/08/20 0905    insulin glargine (LANTUS) subcutaneous injection 30 Units 0 3 mL, 30 Units, Subcutaneous, HS, Willem Greco MD, 15 Units at 09/07/20 2257    insulin lispro (HumaLOG) 100 units/mL subcutaneous injection 1-5 Units, 1-5 Units, Subcutaneous, 4x Daily (AC & HS), 1 Units at 09/07/20 1212 **AND** Fingerstick Glucose (POCT), , , 4x Daily AC and at bedtime, Betty Mcneill DPM    insulin lispro (HumaLOG) 100 units/mL subcutaneous injection 15 Units, 15 Units, Subcutaneous, TID With Meals, Betty Mcneill DPM, 15 Units at 09/07/20 1710    LORazepam (ATIVAN) tablet 0 5 mg, 0 5 mg, Oral, BID PRN, Betty Mcneill DPM    methimazole (TAPAZOLE) tablet 5 mg, 5 mg, Oral, Daily, Betty Mcneill DPM, 5 mg at 09/08/20 0905    ondansetron (ZOFRAN) injection 4 mg, 4 mg, Intravenous, Q6H PRN, Betty Mcneill DPM, 4 mg at 09/03/20 1848    oxyCODONE (ROXICODONE) immediate release tablet 10 mg, 10 mg, Oral, Q6H PRN, Dakota Soto MD, 10 mg at 09/03/20 1432    oxyCODONE (ROXICODONE) IR tablet 5 mg, 5 mg, Oral, Q6H PRN, Dakota Soto MD    pantoprazole (PROTONIX) EC tablet 20 mg, 20 mg, Oral, Early Morning, Betty Mcneill DPM, 20 mg at 09/08/20 0641    polyethylene glycol (MIRALAX) packet 17 g, 17 g, Oral, Daily PRN, Betty Mcneill DPM    sodium chloride 0 9 % infusion, 100 mL/hr, Intravenous, Continuous, Betty Mcneill DPM, Last Rate: 100 mL/hr at 09/08/20 0906, 100 mL/hr at 09/08/20 0906    tamsulosin (FLOMAX) capsule 0 4 mg, 0 4 mg, Oral, Daily With Thelma Arias MD, 0 4 mg at 09/07/20 1534    vancomycin (VANCOCIN) IVPB (premix) 1,000 mg 200 mL, 10 mg/kg (Adjusted), Intravenous, Daily PRN, Dakota Soto MD, Last Rate: 200 mL/hr at 09/08/20 1142, 1,000 mg at 09/08/20 1142    verapamil (CALAN-SR) CR tablet 240 mg, 240 mg, Oral, Daily, Betty Mcneill DPM, 240 mg at 09/08/20 0905    zolpidem (AMBIEN) tablet 10 mg, 10 mg, Oral, HS PRN, Betty Mcneill DPM, 10 mg at 08/31/20 2322    REVIEW OF SYSTEMS     Complete 10 points of review of systems were obtained and discussed in length with patient today    Complete 10 points of review of systems were negative/unremarkable except mentioned in the HPI section  OBJECTIVE     Current Weight: Weight - Scale: 115 kg (253 lb 12 oz)  /81 (BP Location: Right arm)   Pulse 74   Temp 98 3 °F (36 8 °C) (Oral)   Resp 16   Ht 6' 4" (1 93 m)   Wt 115 kg (253 lb 12 oz)   SpO2 96%   BMI 30 89 kg/m²   Vitals:    09/07/20 2319   BP: 157/81   Pulse: 74   Resp: 16   Temp: 98 3 °F (36 8 °C)   SpO2: 96%     Body mass index is 30 89 kg/m²  Intake/Output Summary (Last 24 hours) at 9/8/2020 1143  Last data filed at 9/8/2020 0957  Gross per 24 hour   Intake 3175 ml   Output 2292 ml   Net 883 ml       PHYSICAL EXAMINATION     Physical Exam  Constitutional:       General: He is not in acute distress  HENT:      Head: Normocephalic and atraumatic  Eyes:      General: No scleral icterus  Neck:      Musculoskeletal: Neck supple  Vascular: No JVD  Cardiovascular:      Rate and Rhythm: Normal rate  Pulmonary:      Effort: No accessory muscle usage or respiratory distress  Breath sounds: No wheezing  Abdominal:      General: There is no distension  Palpations: Abdomen is soft  Musculoskeletal:      Right hand: He exhibits no laceration  Left hand: He exhibits no laceration  Skin:     General: Skin is warm  Neurological:      Mental Status: He is alert  Psychiatric:         Speech: He is communicative  Behavior: Behavior is not combative             LAB RESULTS        Results from last 7 days   Lab Units 09/08/20  0648 09/07/20  0447 09/06/20  0657 09/05/20  0611   WBC Thousand/uL 11 44*  --   --  15 73*   HEMOGLOBIN g/dL 10 1*  --   --  10 8*   HEMATOCRIT % 29 8*  --   --  32 0*   PLATELETS Thousands/uL 322  --   --  341   SODIUM mmol/L 139 138 135* 134*   POTASSIUM mmol/L 3 5 3 1* 3 5 3 8   CHLORIDE mmol/L 104 106 101 100   CO2 mmol/L 22 22 24 24   BUN mg/dL 32* 31* 34* 34*   CREATININE mg/dL 2 17* 2 03* 2 32* 2 28*   EGFR ml/min/1 73sq m 33 35 30 31   CALCIUM mg/dL 8 8 7 9* 9 0 8 9       I have personally reviewed the old medical records and patient's previously known baseline creatinine level is ~ 0 8-0 9    RADIOLOGY RESULTS     XR foot 3+ vw right   Final Result by Sheryl Boas, MD (09/04 0602)      Interval first through fourth transmetatarsal and fifth toe amputation with expected postsurgical changes  No radiographic evidence of osteomyelitis  Workstation performed: [id number]         VAS lower limb arterial duplex, limited, unilateral   Final Result by Kristie Love DO (09/01 0913)      MRI foot/forefoot toes right w wo contrast   Final Result by Sheryl Boas, MD (08/31 3696)      Soft tissue wound/ulcer adjacent to distal first metatarsal with underlying phlegmonous cellulitis with a thin tapering tail extending towards the distal residual flexor hallucis longus tendon  New osteomyelitis of the distal residual first metatarsal tapering proximally to the level of the base  Reactive changes in the hallux tibial sesamoid  New osteomyelitis medial aspect of the second metatarsal head and base of the second proximal phalanx  New distal flexor hallucis longus tenosynovitis extending to the level of the mid foot  Infectious tenosynovitis not excluded  The study was marked in [facility] for immediate notification  Workstation performed: [id number]         XR foot 3+ vw right   Final Result by Sheryl Boas, MD (08/31 0802)      Interval development of radiographic evidence of osteomyelitis involving the distal first metatarsal       The study was marked in EPIC for immediate notification  Workstation performed: [id number]         IR tunneled central line placement    (Results Pending)   US retroperitoneal complete    (Results Pending)     2D echocardiogram done on 8/31/2020 showed EF of 50% with grade 1 diastolic dysfunction  PLAN / RECOMMENDATIONS      1  MARKELL  Exact etiology is currently unclear      Upon review of old medical records, previously known baseline creatinine is around 0 8-0 9  Current creatinine is 2 17  Patient was found to have MRSA bacteremia and currently been treated for IV vancomycin and found to have supratherapeutic vancomycin level but with the dose adjustment vancomycin level has improved with last known vancomycin level of 14 9 although renal function has failed to improve suggesting it would be least likely to have vancomycin toxicity leading to MARKELL  Serial bladder scan is showing urinary retention and underlying MARKELL due to urinary retention cannot be ruled out     After discussion with patient, patient is agreeable for Mae catheter placement  Will also check renal ultrasound to evaluate echogenicity  Pending official urology consultation  Clinically patient is not in volume overload state and will plan to continue IV fluid for time being  Plan to check urine lytes for further evaluation  Recheck renal function with AM labs  Thank you for the consultation to participate in patient's care  I have personally discussed my overall above mentioned plan with the referring physician  Akira Daugherty MD  Nephrology  9/8/2020        Portions of the record may have been created with voice recognition software  Occasional wrong word or "sound a like" substitutions may have occurred due to the inherent limitations of voice recognition software  Read the chart carefully and recognize, using context, where substitutions have occurred

## 2020-09-08 NOTE — ASSESSMENT & PLAN NOTE
Lab Results   Component Value Date    HGBA1C 8 1 (H) 08/28/2020       Recent Labs     09/07/20  1629 09/07/20  2042 09/08/20  0647 09/08/20  1304   POCGLU 127 109 103 154*       Blood Sugar Average: Last 72 hrs:  · (P) 601 [id number]     Uncontrolled  Hemoglobin A1c 8 1  Blood sugars improving  Continue Lantus 30 units subcu HS, Humalog 15 units t i d    Continue SSI  Continue to monitor sugar

## 2020-09-09 ENCOUNTER — TELEPHONE (OUTPATIENT)
Dept: OTHER | Facility: HOSPITAL | Age: 55
End: 2020-09-09

## 2020-09-09 PROBLEM — E83.42 HYPOMAGNESEMIA: Status: ACTIVE | Noted: 2020-09-09

## 2020-09-09 LAB
ANION GAP SERPL CALCULATED.3IONS-SCNC: 10 MMOL/L (ref 4–13)
BASOPHILS # BLD AUTO: 0.08 THOUSANDS/ΜL (ref 0–0.1)
BASOPHILS NFR BLD AUTO: 1 % (ref 0–1)
BUN SERPL-MCNC: 30 MG/DL (ref 5–25)
CALCIUM SERPL-MCNC: 8.3 MG/DL (ref 8.3–10.1)
CHLORIDE SERPL-SCNC: 102 MMOL/L (ref 100–108)
CO2 SERPL-SCNC: 25 MMOL/L (ref 21–32)
CREAT SERPL-MCNC: 2.07 MG/DL (ref 0.6–1.3)
EOSINOPHIL # BLD AUTO: 0.36 THOUSAND/ΜL (ref 0–0.61)
EOSINOPHIL NFR BLD AUTO: 3 % (ref 0–6)
ERYTHROCYTE [DISTWIDTH] IN BLOOD BY AUTOMATED COUNT: 11.9 % (ref 11.6–15.1)
GFR SERPL CREATININE-BSD FRML MDRD: 35 ML/MIN/1.73SQ M
GLUCOSE SERPL-MCNC: 123 MG/DL (ref 65–140)
GLUCOSE SERPL-MCNC: 132 MG/DL (ref 65–140)
GLUCOSE SERPL-MCNC: 138 MG/DL (ref 65–140)
GLUCOSE SERPL-MCNC: 81 MG/DL (ref 65–140)
GLUCOSE SERPL-MCNC: 98 MG/DL (ref 65–140)
HCT VFR BLD AUTO: 28.4 % (ref 36.5–49.3)
HGB BLD-MCNC: 9.5 G/DL (ref 12–17)
IMM GRANULOCYTES # BLD AUTO: 0.05 THOUSAND/UL (ref 0–0.2)
IMM GRANULOCYTES NFR BLD AUTO: 0 % (ref 0–2)
LYMPHOCYTES # BLD AUTO: 2.26 THOUSANDS/ΜL (ref 0.6–4.47)
LYMPHOCYTES NFR BLD AUTO: 19 % (ref 14–44)
MAGNESIUM SERPL-MCNC: 1.3 MG/DL (ref 1.6–2.6)
MCH RBC QN AUTO: 29.9 PG (ref 26.8–34.3)
MCHC RBC AUTO-ENTMCNC: 33.5 G/DL (ref 31.4–37.4)
MCV RBC AUTO: 89 FL (ref 82–98)
MONOCYTES # BLD AUTO: 1.17 THOUSAND/ΜL (ref 0.17–1.22)
MONOCYTES NFR BLD AUTO: 10 % (ref 4–12)
NEUTROPHILS # BLD AUTO: 7.72 THOUSANDS/ΜL (ref 1.85–7.62)
NEUTS SEG NFR BLD AUTO: 67 % (ref 43–75)
NRBC BLD AUTO-RTO: 0 /100 WBCS
PLATELET # BLD AUTO: 334 THOUSANDS/UL (ref 149–390)
PMV BLD AUTO: 10.4 FL (ref 8.9–12.7)
POTASSIUM SERPL-SCNC: 3.6 MMOL/L (ref 3.5–5.3)
RBC # BLD AUTO: 3.18 MILLION/UL (ref 3.88–5.62)
SODIUM SERPL-SCNC: 137 MMOL/L (ref 136–145)
WBC # BLD AUTO: 11.64 THOUSAND/UL (ref 4.31–10.16)

## 2020-09-09 PROCEDURE — 82948 REAGENT STRIP/BLOOD GLUCOSE: CPT

## 2020-09-09 PROCEDURE — 99232 SBSQ HOSP IP/OBS MODERATE 35: CPT | Performed by: INTERNAL MEDICINE

## 2020-09-09 PROCEDURE — 85025 COMPLETE CBC W/AUTO DIFF WBC: CPT | Performed by: INTERNAL MEDICINE

## 2020-09-09 PROCEDURE — 99252 IP/OBS CONSLTJ NEW/EST SF 35: CPT | Performed by: NURSE PRACTITIONER

## 2020-09-09 PROCEDURE — 99233 SBSQ HOSP IP/OBS HIGH 50: CPT | Performed by: INTERNAL MEDICINE

## 2020-09-09 PROCEDURE — 83735 ASSAY OF MAGNESIUM: CPT | Performed by: NURSE PRACTITIONER

## 2020-09-09 PROCEDURE — 80048 BASIC METABOLIC PNL TOTAL CA: CPT | Performed by: INTERNAL MEDICINE

## 2020-09-09 RX ORDER — MAGNESIUM SULFATE HEPTAHYDRATE 40 MG/ML
2 INJECTION, SOLUTION INTRAVENOUS ONCE
Status: COMPLETED | OUTPATIENT
Start: 2020-09-09 | End: 2020-09-09

## 2020-09-09 RX ORDER — TAMSULOSIN HYDROCHLORIDE 0.4 MG/1
0.4 CAPSULE ORAL
Qty: 30 CAPSULE | Refills: 0 | Status: SHIPPED | OUTPATIENT
Start: 2020-09-09 | End: 2020-10-05

## 2020-09-09 RX ADMIN — VANCOMYCIN HYDROCHLORIDE 1250 MG: 5 INJECTION, POWDER, LYOPHILIZED, FOR SOLUTION INTRAVENOUS at 23:17

## 2020-09-09 RX ADMIN — VERAPAMIL HYDROCHLORIDE 240 MG: 120 TABLET, FILM COATED, EXTENDED RELEASE ORAL at 08:56

## 2020-09-09 RX ADMIN — ASPIRIN 81 MG 81 MG: 81 TABLET ORAL at 08:57

## 2020-09-09 RX ADMIN — SODIUM CHLORIDE 100 ML/HR: 0.9 INJECTION, SOLUTION INTRAVENOUS at 23:18

## 2020-09-09 RX ADMIN — MAGNESIUM SULFATE IN WATER 2 G: 40 INJECTION, SOLUTION INTRAVENOUS at 10:42

## 2020-09-09 RX ADMIN — TAMSULOSIN HYDROCHLORIDE 0.4 MG: 0.4 CAPSULE ORAL at 16:56

## 2020-09-09 RX ADMIN — DOCUSATE SODIUM 100 MG: 100 CAPSULE, LIQUID FILLED ORAL at 08:56

## 2020-09-09 RX ADMIN — DOCUSATE SODIUM 100 MG: 100 CAPSULE, LIQUID FILLED ORAL at 18:28

## 2020-09-09 RX ADMIN — SODIUM CHLORIDE 100 ML/HR: 0.9 INJECTION, SOLUTION INTRAVENOUS at 10:07

## 2020-09-09 RX ADMIN — METHIMAZOLE 5 MG: 5 TABLET ORAL at 08:58

## 2020-09-09 RX ADMIN — INSULIN LISPRO 15 UNITS: 100 INJECTION, SOLUTION INTRAVENOUS; SUBCUTANEOUS at 18:33

## 2020-09-09 RX ADMIN — PANTOPRAZOLE SODIUM 20 MG: 20 TABLET, DELAYED RELEASE ORAL at 06:06

## 2020-09-09 RX ADMIN — INSULIN LISPRO 15 UNITS: 100 INJECTION, SOLUTION INTRAVENOUS; SUBCUTANEOUS at 08:59

## 2020-09-09 RX ADMIN — VANCOMYCIN HYDROCHLORIDE 1250 MG: 5 INJECTION, POWDER, LYOPHILIZED, FOR SOLUTION INTRAVENOUS at 13:29

## 2020-09-09 RX ADMIN — CLOPIDOGREL BISULFATE 75 MG: 75 TABLET ORAL at 08:58

## 2020-09-09 RX ADMIN — ATORVASTATIN CALCIUM 40 MG: 40 TABLET, FILM COATED ORAL at 16:56

## 2020-09-09 RX ADMIN — INSULIN LISPRO 15 UNITS: 100 INJECTION, SOLUTION INTRAVENOUS; SUBCUTANEOUS at 13:34

## 2020-09-09 NOTE — CONSULTS
CONSULT    Patient Name: Montana Adamson  Patient MRN: [id number]  Date of Service: 9/9/2020   Date / Time Note Created: 9/9/2020 8:50 AM   Referring Provider: Karla Bean MD    Provider Creating Note: NAILA Cruz    PCP: Kristie Solares  Attending Provider:  Karla Bean MD    Reason for Consult: Urinary Retention    HPI --Montana Adamson is a 52-year-old male with history of uncontrolled diabetes, polyneuropathy admitted for right lower extremity nonhealing wound with resultant osteomyelitis  Last hemoglobin A1c 9 7  He is postoperative day 6 right TMA  He has had multiple consultants during admission including Nephrology for acute kidney injury  Prior baseline creatinine 0 8--0 9  During hospitalization, creatinine remained consistently above 2  Nursing staff monitor voiding and multiple serial bladder scans demonstrated persistent retention with volumes exceeding 500 mL  Mae catheter was inserted after multiple intermittent catheterizations  Remains in-situ with very light blush urine  Nursing staff deny difficulty with catheterization  Recent ultrasound was negative for hydronephrosis  There are no suspicious masses or shadowing calculi  Bladder was decompressed  Urologic consultation requested for further management recommendations        Source:chart review and the patient           Patient Active Problem List   Diagnosis    Hypertension, essential    Gastroesophageal reflux disease without esophagitis    Hyperlipidemia    History of nonadherence to medical treatment    Obesity, Class I, BMI 30 0-34 9 (see actual BMI)    Type 2 diabetes mellitus with hyperglycemia, with long-term current use of insulin (HCC)    Diabetes mellitus [facility]    Type 2 diabetes mellitus with diabetic polyneuropathy, with long-term current use of insulin ([facility])    Vertigo    Right-sided tinnitus    Colon polyps    Elevated liver enzymes    Hyperthyroidism    Health maintenance examination    Gangrene of toe of right foot (HCC)    Severe sepsis (HCC)    Acute kidney injury ([facility]    Hyponatremia    PAD (peripheral artery disease) (HCC)    Amputation of right great toe (HCC)    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    Thyroid cancer ([facility] )    Osteomyelitis ([facility]    MRSA bacteremia    Acute kidney injury (MARKELL) with acute tubular necrosis (ATN) ([facility])    Urinary retention             Impressions  Acute urinary retention--multifactorial and related to patient's acute illness (osteomyelitis) resultant recumbency, recent anesthesia, pain medication and 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  Retention may be chronic but unrecognized  Patient admits to having full bladder and still remaining insensate to physiologic triggers to void  Acute kidney injury--mixed etiology  Gross hematuria--mild  Secondary to urethral/Mae trauma while on Plavix  Recommendations  1  Continue medical optimization and treatment for primary concern  2  Maintain Mae catheter to straight drainage  3  Do not remove  Not nursing or other department managed  4  May irrigate catheter q i d  To maintain patency  5  Based on recent discussion for discharge planning, patient will very likely be transferred to rehab well he will receive extensive antimicrobial course  6  Patient agrees with catheter at discharge  7  Rehab nursing staff can remove catheter for void trial in approximately 10 days  8  He will follow up in our office for further workup              Past Medical History:   Diagnosis Date    Diabetes mellitus ([facility] )     Hyperlipidemia     Hypertension     Thyroid cancer [facility])        Past Surgical History:   Procedure Laterality Date    FOOT SURGERY Right 2014    IR AORTAGRAM WITH RUN-OFF  8/6/2020    AL AMPUTATION FOOT,TRANSMETATARSAL Right 9/3/2020    Procedure: AMPUTATION TRANSMETATARSAL (TMA);  Surgeon: Ania Read DPM;  Location: [facility];  Service: Podiatry    TOE AMPUTATION Right 8/11/2020    Procedure: AMPUTATION TOE;  Surgeon: Brianna Lindquist DPM;  Location: [facility];  Service: [address] THYROID BIOPSY  7/2/2020       Family History   Problem Relation Age of Onset    Cancer Mother     Hypertension Father        Social History     Socioeconomic History    Marital status: Single     Spouse name: Not on file    Number of children: Not on file    Years of education: Not on file    Highest education level: Not on file   Occupational History    Not on file   Social Needs    Financial resource strain: Not on file    Food insecurity     Worry: Not on file     Inability: Not on file    Transportation needs     Medical: Not on file     Non-medical: Not on file   Tobacco Use    Smoking status: Never Smoker    Smokeless tobacco: Never Used   Substance and Sexual Activity    Alcohol use: Not Currently     Alcohol/week: 8 0 standard drinks     Types: 8 Cans of beer per week     Comment: last drink was 10 days ago    Drug use: No     Types: Marijuana    Sexual activity: Not on file   Lifestyle    Physical activity     Days per week: Not on file     Minutes per session: Not on file    Stress: Not on file   Relationships    Social connections     Talks on phone: Not on file     Gets together: Not on file     Attends Yazidi service: Not on file     Active member of club or organization: Not on file     Attends meetings of clubs or organizations: Not on file     Relationship status: Not on file    Intimate partner violence     Fear of current or ex partner: Not on file     Emotionally abused: Not on file     Physically abused: Not on file     Forced sexual activity: Not on file   Other Topics Concern    Not on file   Social History Narrative    Light caffeine     Wears seatbelt daily    Lives with girlfriend       No Known Allergies    Review of Systems  Review of Systems - History obtained from chart review and the patient  General ROS: negative for - chills or fever  Respiratory ROS: no cough, shortness of breath, or wheezing  Cardiovascular ROS: no chest pain or dyspnea on exertion  Gastrointestinal ROS: no abdominal pain, change in bowel habits, or black or bloody stools  Genito-Urinary ROS: positive for - change in urinary stream and hematuria  negative for - dysuria, genital discharge, nocturia, pelvic pain, scrotal mass/pain or urinary frequency/urgency  Neurological ROS: no TIA or stroke symptoms    Chart Review   Allergies, current medications, history, problem list    Vital Signs & Physical Exam  General appearance: alert and oriented, in no acute distress, appears older than stated age, cooperative, no distress and Extremely depressed affect  Head: Normocephalic, without obvious abnormality, atraumatic  Neck: no adenopathy, no carotid bruit, no JVD, supple, symmetrical, trachea midline and thyroid not enlarged, symmetric, no tenderness/mass/nodules  Lungs: diminished breath sounds  Heart: regular rate and rhythm, S1, S2 normal, no murmur, click, rub or gallop  Abdomen: soft, non-tender; bowel sounds normal; no masses,  no organomegaly  Extremities: Right lower extremity dressing status post TMA  Pulses: 2+ and symmetric  Neurologic: Grossly normal  Mae patent for clear blush urine     Laboratory Studies  Lab Results   Component Value Date    HGBA1C 8 1 (H) 08/28/2020    HGBA1C 10 5 (H) 06/22/2018    K 3 6 09/09/2020     09/09/2020    CO2 25 09/09/2020    CREATININE 2 07 (H) 09/09/2020    BUN 30 (H) 09/09/2020    MG 1 3 (L) 09/09/2020               Imaging and Other Studies  )Xr Foot 3+ Vw Right    Result Date: 9/4/2020  Narrative: RIGHT FOOT INDICATION:   post op  COMPARISON:  Right foot radiographs 8/28/2020 VIEWS:  XR FOOT 3+ VW RIGHT FINDINGS: Interval first through fourth transmetatarsal amputation and amputation of the fifth toe including small residual bone fragment from prior distal fifth metatarsal resection   Small plantar calcaneal spur  No significant degenerative changes  No lytic or blastic osseous lesion  Soft tissue swelling and scattered soft tissue gas in the residual forefoot compatible with expected recent postsurgical changes  Diffuse vascular calcification  Impression: Interval first through fourth transmetatarsal and fifth toe amputation with expected postsurgical changes  No radiographic evidence of osteomyelitis  Workstation performed: [id number]     Xr Foot 3+ Vw Right    Result Date: 8/31/2020  Narrative: RIGHT FOOT INDICATION:   nonhealing ulcer right foot  COMPARISON:  Right foot radiographs 8/11/2020 and MRI right foot 8/28/2020 VIEWS:  XR FOOT 3+ VW RIGHT FINDINGS: New lytic destructive process of the distal first metatarsal with intramedullary lucency extending at least 1 5 cm proximal to the level of the sesamoids  Overlying soft tissue irregularity again noted  Prior amputation of the great toe at the level of the first metatarsal head  Prior resection of the mid to distal fifth metatarsal with small residual chronic bone fragments again noted  Old healed fracture of the second and fourth metatarsals  Small plantar calcaneal spur    No significant degenerative changes    No lytic or blastic osseous lesion  Plantar medial soft tissue defect as above  Diffuse vascular calcification  Impression: Interval development of radiographic evidence of osteomyelitis involving the distal first metatarsal  The study was marked in EPIC for immediate notification  Workstation performed: [id number]     Xr Foot 3+ Vw Right    Result Date: 8/11/2020  Narrative: RIGHT FOOT INDICATION:   post op  COMPARISON:  August 4, 2020  VIEWS:  XR FOOT 3+ VW RIGHT FINDINGS: There has been amputation of the right great toe as well as the head of the 1st metatarsal  There is no evidence of acute fracture or dislocation  There has been prior resection of portions of the right 5th metatarsal  No significant degenerative changes   No lytic or blastic osseous lesion  A small amount of soft tissue air is present at the amputation bed  Impression: Unremarkable appearance of the right foot following amputation of the 1st ray at the level of the metatarsal head  Workstation performed: [id number]     Mri Foot/forefoot Toes Right W Wo Contrast    Result Date: 8/31/2020  Narrative: MRI - RIGHT FOOT WITH AND WITHOUT CONTRAST INDICATION:   r/o osteo  COMPARISON:  Right foot radiographs 8/28/2020, 8/11/2020 and right foot MRI 8/4/2020 TECHNIQUE:  MR sequences were obtained of the right foot including the following:  Precontrast sequences: localizers, sagittal STIR, sagittal T1, coronal T1, coronal T2 fat sat/STIR, axial T2 fat sat/STIR, axial PD, coronal T1 fat sat  Postcontrast sequences: Coronal T1 fat sat, sagittal T1 fat sat  IV Contrast:  11 mL of gadobutrol injection (MULTI-DOSE) FINDINGS: SUBCUTANEOUS TISSUES: Soft tissue wound/ulcer adjacent to the distal first metatarsal with adjacent subcutaneous edema and hyperemia that extends proximally to the level of the first tarsometatarsal level  Underlying post amputation susceptibility artifact slightly limits evaluation of the underlying soft tissues  Heterogeneous T2 hyperintense collection medial to the distal first metatarsal with a tail extending plantar inferiorly and approximating the distal residual flexor hallucis longus tendon sheath images 17 through 22 series 5  BONES:  Edema in the residual first metatarsal head and neck with confluent T1 hypointense signal and postcontrast hyperemia  Abnormal signal extends through the base  Osseous destruction of the distal first metatarsal  Minimal subcortical edema with T1 hypointensity and postcontrast hyperemia in the medial aspect of the second metatarsal head and base of the second proximal phalanx  Prior amputation of the great toe at the level of the first metatarsal head   Prior resection of the mid to distal fifth metatarsal  Old healed fracture of the second and fourth metatarsals  FIRST MTP JOINT:  Post amputation  SESAMOID BONES:  Edema with corresponding T1 hypointensity and hyperemia of the hallux fibular sesamoid  Mild heterogeneous edema and patchy T1 hypointense signal in the hallux tibial sesamoid  OTHER ARTICULAR SURFACES:  Normal  PLANTAR FASCIA:  Intact  LISFRANC LIGAMENT:  Intact  FOREFOOT TENDONS:  Post amputation changes  New mild distal flexor hallucis longus tenosynovitis to the level of the mid foot  INTERMETATARSAL REGIONS:  Heterogeneous T2 hyperintensity and hyperemia in between the residual first metatarsal head and second metatarsal head  MUSCULATURE: Plantar and intermetatarsal fatty muscle atrophy  Impression: Soft tissue wound/ulcer adjacent to distal first metatarsal with underlying phlegmonous cellulitis with a thin tapering tail extending towards the distal residual flexor hallucis longus tendon  New osteomyelitis of the distal residual first metatarsal tapering proximally to the level of the base  Reactive changes in the hallux tibial sesamoid  New osteomyelitis medial aspect of the second metatarsal head and base of the second proximal phalanx  New distal flexor hallucis longus tenosynovitis extending to the level of the mid foot  Infectious tenosynovitis not excluded  The study was marked in [facility] for immediate notification  Workstation performed: [id number]     Vas Lower Limb Arterial Duplex, Limited, Unilateral    Result Date: 9/1/2020  Narrative:  [facility] REPORT CLINICAL: Indications:  Patient presents with non healing wound on the right foot  History of recent right popliteal artery stent  Evaluate for patency of the right popliteal artery and stent  Operative History: 2020-08-11 Right Great toe amputation 2020-08-06 Right Popliteal artery stent Risk Factors The patient has history of HTN and Diabetes (Yes)  He has no history of HLD  Clinical Right Pressure:  120/ mm Hg, Left Pressure: IV site    FINDINGS: Right                  PSV (cm/s)  Common Femoral Artery          73  Prox Profunda                  57  Prox SFA                       72  Mid SFA                        79  Dist SFA                       81  Proximal Pop - Stent           72  Distal Pop - Stent            127  Tibioperoneal                  73  Dist Post Tibial              104  Prox  Ant  Tibial              74  Dist  Ant  Tibial              91  Dist Peroneal                  32     CONCLUSION:  Impression: RIGHT LOWER LIMB: This resting evaluation shows patent popliteal artery and stent  Ankle/Brachial index: 145 which is non compressible (Prior 1 33) PVR/ PPG tracings are normal  Metatarsal pressure not obtained due to wound  Great toe pressure of 97 mmHg, within the healing range There are an echogenic structures located in the inguinal region and are consistent with enlarged lymph node and channels  LEFT LOWER LIMB: Ankle/Brachial index:  1 44 which is non compressible (Prior 1 30 ) PVR/ PPG tracings are normal  Metatarsal pressure of 186 mmHg Great toe pressure of 140 mmHg, within the healing range  Compared to previous study on 8/7/2020, there is no significant change in the SHANNAN's bilaterally  SIGNATURE: Electronically Signed by: Francie Macario on 2020-09-01 09:13:44 AM    [facility]    Result Date: 9/8/2020  Narrative: RENAL ULTRASOUND INDICATION:   abn labs   COMPARISON: None TECHNIQUE:   Ultrasound of the retroperitoneum was performed with a curvilinear transducer utilizing volumetric sweeps and still imaging techniques  FINDINGS: KIDNEYS: Symmetric and normal size  Right kidney:  14 8 cm  Left kidney:  14 4 cm  Right kidney Normal echogenicity and contour  No suspicious masses detected  No hydronephrosis  No shadowing calculi  No perinephric fluid collections  Left kidney Normal echogenicity and contour  No suspicious masses detected  No hydronephrosis  No shadowing calculi  No perinephric fluid collections  URETERS: Nonvisualized   BLADDER: Decompressive Mae catheter     Impression: No significant abnormality Workstation performed: [id number]         Medications   Scheduled Meds:  Current Facility-Administered Medications   Medication Dose Route Frequency Provider Last Rate    aluminum-magnesium hydroxide-simethicone  30 mL Oral Q6H PRN [facility], DPM      aspirin  81 mg Oral Daily [facility], [place]      atorvastatin  40 mg Oral Daily With [employer], DPM      clopidogrel  75 mg Oral Daily [facility], [place]      docusate sodium  100 mg Oral BID Judith Medel MD      heparin (porcine)  5,000 Units Subcutaneous Q8H [address] NAILA Suarez      insulin glargine  30 Units Subcutaneous HS Judith Medel MD      insulin lispro  1-5 Units Subcutaneous 4x Daily (AC & HS) [facility], DPM      insulin lispro  15 Units Subcutaneous TID With Meals [facility], DPM      LORazepam  0 5 mg Oral BID PRN [facility], DPM      methimazole  5 mg Oral Daily [facility], DPM      ondansetron  4 mg Intravenous Q6H PRN [facility], DPM      oxyCODONE  10 mg Oral Q6H PRN Judith Medel MD      oxyCODONE  5 mg Oral Q6H PRN Judith Medel MD      pantoprazole  20 mg Oral Early Morning [facility], DPM      polyethylene glycol  17 g Oral Daily PRN [facility], DPM      sodium chloride  100 mL/hr Intravenous Continuous [facility],  mL/hr (09/08/20 5562)    tamsulosin  0 4 mg Oral Daily With Delbert Denton MD      vancomycin  12 5 mg/kg (Adjusted) Intravenous Q12H NAILA Suarez 1,250 mg (09/08/20 2253)    verapamil  240 mg Oral Daily [facility], DPM      zolpidem  10 mg Oral HS PRN [facility], DPM       Continuous Infusions:sodium chloride, 100 mL/hr, Last Rate: 100 mL/hr (09/08/20 0906)      PRN Meds:   aluminum-magnesium hydroxide-simethicone    LORazepam    ondansetron    oxyCODONE    oxyCODONE    polyethylene glycol    zolpidem          )Ryan Flores Nemo Kelly

## 2020-09-09 NOTE — ASSESSMENT & PLAN NOTE
Lab Results   Component Value Date    HGBA1C 8 1 (H) 08/28/2020       Recent Labs     09/08/20  1304 09/08/20  1532 09/08/20  2108 09/09/20  0607   POCGLU 154* 134 103 123       Blood Sugar Average: Last 72 hrs:  · (P) 511 [id number]     Uncontrolled  Hemoglobin A1c 8 1  Blood sugars improving  Continue Lantus 30 units subcu HS, Humalog 15 units t i d    Continue SSI  Continue to monitor sugar

## 2020-09-09 NOTE — PROGRESS NOTES
Progress Note - Infectious Disease   Brunetta Burkitt 60 y o  male MRN: [id number]  Unit/Bed#: -01 Encounter: [id number]      A/P:  1  MRSA bacteremia   Secondary to #2/3   Negative 2D echo   No intravascular prosthetic devices   Repeat blood culture are negative      -continue IV vancomycin with dosing recommendations per pharmacy  Vancomycin level remains at goal   -plan for 4 week course of IV antibiotics from negative blood culture, through 9/27   -check weekly CBC with diff, creatinine, vancomycin level  -outpatient ID follow-up after hospital discharge  -okay to proceed with line placement at any time     2  Right foot cellulitis   Suspect secondary to MRSA   Wound culture also shows Morganella, Enterobacter, Enterococcus, which is not unexpected finding in the setting of chronic ulceration   Would continue targeted therapy for MRSA infection   Now status post TMA     -antibiotic plan as above  -serial foot exams     3  Right foot nonhealing ulceration   With residual 1st and 2nd toe osteomyelitis   Podiatry input noted  Now s/p TMA on 09/03 with presumed surgical cure of osteomyelitis      -antibiotic plan as above  -daily local wound care and dressing changes  -outpatient podiatry follow-up     4  Status post 1st digit amputation on 08/11/2020   Complicated by above      5  Acute kidney injury  Creatinine has trended up and remains stable around 2, with baseline around 0 8-0 9  Nephrology input noted with lower suspicion for vancomycin toxicity  Consider secondary to urinary retention  Now status post Mae catheter placement  No acute findings on renal ultrasound      -dose adjust antibiotic based on renal function  -management of Mae catheter per Urology  -nephrology follow-up ongoing  -monitor BMP closely     6  PAD   Status post recent revascularization   Vascular surgery input noted      7  Uncontrolled diabetes mellitus   Risk factor for severe infection      8  Leukocytosis   Likely reactive in the immediate postoperative setting   Remains afebrile   Blood cultures showed clearance of bacteremia  WBC count has come down      -antibiotic plan as above  -monitor CBC  -monitor temperatures     Vancomycin  POD6        Subjective:  Underwent Mae catheter placement  Foot was re-evaluated by Podiatry today with some maceration noted at incision site  No fevers  Objective:  Vitals:  Temp:  [98 2 °F (36 8 °C)] 98 2 °F (36 8 °C)  HR:  [66-78] 73  Resp:  [18] 18  BP: (138-161)/(79-88) 161/88  SpO2:  [95 %-97 %] 95 %  Temp (24hrs), Av 2 °F (36 8 °C), Min:98 2 °F (36 8 °C), Max:98 2 °F (36 8 °C)  Current: Temperature: 98 2 °F (36 8 °C)    Physical Exam:   General:  No acute distress  Psychiatric:  Awake and alert  Pulmonary:  Normal respiratory excursion without accessory muscle use  Abdomen:  Soft, nontender  Extremities:  No edema  Mae catheter in place  Skin:  No rashes  Foot dressing intact with no ascending erythema    Lab Results:  I have personally reviewed pertinent labs  Results from last 7 days   Lab Units 20  0431 20  0648 20  0447   POTASSIUM mmol/L 3 6 3 5 3 1*   CHLORIDE mmol/L 102 104 106   CO2 mmol/L 25 22 22   BUN mg/dL 30* 32* 31*   CREATININE mg/dL 2 07* 2 17* 2 03*   EGFR ml/min/1 73sq m 35 33 35   CALCIUM mg/dL 8 3 8 8 7 9*     Results from last 7 days   Lab Units 20  0435 20  0648 20  0611   WBC Thousand/uL 11 64* 11 44* 15 73*   HEMOGLOBIN g/dL 9 5* 10 1* 10 8*   PLATELETS Thousands/uL 334 322 341           Imaging Studies:   I have personally reviewed pertinent imaging study reports and images in PACS  Renal ultrasound shows no significant abnormalities  EKG, Pathology, and Other Studies:   I have personally reviewed pertinent reports

## 2020-09-09 NOTE — PROGRESS NOTES
Progress Note - Elidia Ken 60 y o  male MRN: [id number]    Unit/Bed#: -18 Encounter: [id number]      Assessment:  1) POD #6 right foot TMA  Secondary to nonhealing ulcer and osteomyelitis and uncontrolled diabetes   2) PAD    Plan:  1) Dressings are changed with xeroform and DSD  - serous drainage is noted  2) Dressing changes for nursing orders are placed  3) Continue IV antibiotics as per Medicine and ID  4) Strict nonweightbearing to the right foot  5) Serial foot exams  6) With patient's uncontrolled diabetes, PAD, and infection status he remains at high risk for loss of limb, this is discussed with patient that this procedure will be the final foot surgery possible and if it does not heal he will need a leg amputation, patient voiced understanding     Subjective:   Patient is seen resting comfortably  NAD  Patient states that he is upset mostly about his kidneys  He is frustrated that he is still in the hospital       Objective:     Vitals: Blood pressure 161/88, pulse 73, temperature 98 2 °F (36 8 °C), temperature source Oral, resp  rate 18, height 6' 4" (1 93 m), weight 115 kg (253 lb 12 oz), SpO2 95 %  ,Body mass index is 30 89 kg/m²        Intake/Output Summary (Last 24 hours) at 9/9/2020 0919  Last data filed at 9/9/2020 0601  Gross per 24 hour   Intake 660 ml   Output 1900 ml   Net -1240 ml       Physical Exam: sutures are intact, maceration to incision site, serous drainage is noted, skin is coapted, the medial aspect had some slough tissue noted, the skin around the incision site remains pink and viable, no signs of necrosis at this time    Invasive Devices     Peripherally Inserted Central Catheter Line            PICC Line 09/08/20 Right Other (Comment) less than 1 day          Peripheral Intravenous Line            Peripheral IV 09/06/20 Left;Ventral (anterior) Hand 2 days          Drain            Urethral Catheter 18 Fr  less than 1 day

## 2020-09-09 NOTE — PROGRESS NOTES
Progress Note - Elidia Ken 1965, 60 y o  male MRN: [id number]    Unit/Bed#: -01 Encounter: [id number]    Primary Care Provider: Anupama Curiel MD   Date and time admitted to hospital: 8/28/2020  3:14 PM        Amputation of right great toe [facility])  Assessment & Plan  Now presented again with poor wound healing, wound dehiscence, possible cellulitis, osteomyelitis  Plan is as stated above under 1  And 2  PAD (peripheral artery disease) [facility])  Assessment & Plan  Patient history of recent right popliteal PTA/ stent 08/06/2020 and right hallux amputation  Now noted with osteomyelitis as evident on right foot MRI report  Cleared by vascular surgery for further debridement and/or amputation  Continue aspirin, statin, Plavix  Follow-up with vascular surgery recommendation as well as podiatry following  Hyperthyroidism  Assessment & Plan  · Diagnosed with thyroid cancer in July  · TSH 0 080  · Free T4 1 39  · Continue home dose of methimazole 5 mg daily  · Recommended close outpatient follow-up  Type 2 diabetes mellitus with diabetic polyneuropathy, with long-term current use of insulin [facility])  Assessment & Plan  Lab Results   Component Value Date    HGBA1C 8 1 (H) 08/28/2020       Recent Labs     09/08/20  1304 09/08/20  1532 09/08/20  2108 09/09/20  0607   POCGLU 154* 134 103 123       Blood Sugar Average: Last 72 hrs:  · (P) 284 [id number]     Uncontrolled  Hemoglobin A1c 8 1  Blood sugars improving  Continue Lantus 30 units subcu HS, Humalog 15 units t i d  Continue SSI  Continue to monitor sugar        Hyperlipidemia  Assessment & Plan  · Continue statin    Gastroesophageal reflux disease without esophagitis  Assessment & Plan  · Continue PPI    Hypertension, essential  Assessment & Plan  · Controlled  · Continue verapamil   Avalide held secondary to acute kidney injury  · Continue to monitor blood pressure closely  · BP stable      * Osteomyelitis (HCC)  Assessment & Plan  Recent history of right hallux amputation  Presented with wound dehiscence, wound cellulitis, possible osteomyelitis  2/2 blood culture from 2020 growing MRSA  Right foot MRI report reviewed and noted for osteomyelitis  Podiatry recommendations appreciated  Pod # 5 status post right-sided TMA  Continue IV vancomycin for total of 4 weeks through   Due to patient's worsening creatinine order for central line by IR was placed insert of PICC line in an attempt to preserve the arm in case patient's creatinine deteriorates and were to need dialysis  Pending today        Currently afebrile, hemodynamically stable  Encourage for strict nonweightbearing to right lower extremity at this time  Continue dressing changes per podiatrist recommendation  VTE Pharmacologic Prophylaxis:   Pharmacologic: Heparin  Mechanical VTE Prophylaxis in Place: Yes    Patient Centered Rounds: I have performed bedside rounds with nursing staff today  Discussions with Specialists or Other Care Team Provider: cm, nursing    Education and Discussions with Family / Patient: pt    Time Spent for Care: 30 minutes  More than 50% of total time spent on counseling and coordination of care as described above  Current Length of Stay: 12 day(s)    Current Patient Status: Inpatient   Certification Statement: The patient will continue to require additional inpatient hospital stay due to Still requiring IV antibiotics, MARKELL, needs placed    Discharge Plan:  Still needs further intervention for MARKELL and will need placement    Code Status: Level 1 - Full Code      Subjective:   Patient seen examined  No acute overnight events    Denies any shortness of breath, cough, fevers, chills    Objective:     Vitals:   Temp (24hrs), Av 2 °F (36 8 °C), Min:98 2 °F (36 8 °C), Max:98 2 °F (36 8 °C)    Temp:  [98 2 °F (36 8 °C)] 98 2 °F (36 8 °C)  HR:  [66-78] 73  Resp:  [18] 18  BP: (138-161)/(79-88) 161/88  SpO2:  [95 %-99 %] 95 %  Body mass index is 30 89 kg/m²  Input and Output Summary (last 24 hours): Intake/Output Summary (Last 24 hours) at 9/9/2020 1010  Last data filed at 9/9/2020 1007  Gross per 24 hour   Intake 2981 67 ml   Output 1600 ml   Net 1381 67 ml       Physical Exam:     Physical Exam  Constitutional:       General: He is not in acute distress  Appearance: He is well-developed  He is not diaphoretic  HENT:      Head: Normocephalic and atraumatic  Nose: Nose normal       Mouth/Throat:      Pharynx: No oropharyngeal exudate  Eyes:      General: No scleral icterus  Conjunctiva/sclera: Conjunctivae normal    Neck:      Musculoskeletal: Normal range of motion and neck supple  Cardiovascular:      Rate and Rhythm: Normal rate and regular rhythm  Heart sounds: Normal heart sounds  No murmur  No friction rub  No gallop  Pulmonary:      Effort: Pulmonary effort is normal  No respiratory distress  Breath sounds: Normal breath sounds  No wheezing or rales  Chest:      Chest wall: No tenderness  Abdominal:      General: Bowel sounds are normal  There is no distension  Palpations: Abdomen is soft  Tenderness: There is no abdominal tenderness  There is no guarding  Musculoskeletal: Normal range of motion  General: No tenderness or deformity  Skin:     General: Skin is warm and dry  Findings: No erythema  Neurological:      Mental Status: He is alert and oriented to person, place, and time             Additional Data:     Labs:    Results from last 7 days   Lab Units 09/09/20  0435   WBC Thousand/uL 11 64*   HEMOGLOBIN g/dL 9 5*   HEMATOCRIT % 28 4*   PLATELETS Thousands/uL 334   NEUTROS PCT % 67   LYMPHS PCT % 19   MONOS PCT % 10   EOS PCT % 3     Results from last 7 days   Lab Units 09/09/20  0431   SODIUM mmol/L 137   POTASSIUM mmol/L 3 6   CHLORIDE mmol/L 102   CO2 mmol/L 25   BUN mg/dL 30*   CREATININE mg/dL 2 07*   ANION GAP mmol/L 10   CALCIUM mg/dL 8 3 GLUCOSE RANDOM mg/dL 138         Results from last 7 days   Lab Units 09/09/20  0607 09/08/20  2108 09/08/20  1532 09/08/20  1304 09/08/20  0647 09/07/20  2042 09/07/20  1629 09/07/20  1211 09/07/20  0704 09/06/20  2131 09/06/20  1516 09/06/20  1107   POC GLUCOSE mg/dl 123 103 134 154* 103 109 127 190* 203* 250* 181* 239*                   * I Have Reviewed All Lab Data Listed Above  * Additional Pertinent Lab Tests Reviewed:  All Labs Within Last 24 Hours Reviewed    Imaging:    Imaging Reports Reviewed Today Include: na  Imaging Personally Reviewed by Myself Includes:  na    Recent Cultures (last 7 days):           Last 24 Hours Medication List:   Current Facility-Administered Medications   Medication Dose Route Frequency Provider Last Rate    aluminum-magnesium hydroxide-simethicone  30 mL Oral Q6H PRN Lemon Liner, DPM      aspirin  81 mg Oral Daily Lemon Liner, [facility]      atorvastatin  40 mg Oral Daily With [employer], DPM      clopidogrel  75 mg Oral Daily Lemon Liner, [facility]      docusate sodium  100 mg Oral BID Pamela Garcia MD      heparin (porcine)  5,000 Units Subcutaneous Q8H [facility] & assisted NAILA Suarez      insulin glargine  30 Units Subcutaneous HS Pamela Garcia MD      insulin lispro  1-5 Units Subcutaneous 4x Daily (AC & HS) Lemon Liner, DPM      insulin lispro  15 Units Subcutaneous TID With Meals Lemon Liner, DPM      LORazepam  0 5 mg Oral BID PRN Lemon Liner, DPM      magnesium sulfate  2 g Intravenous Once Gerhard Hamilton MD      methimazole  5 mg Oral Daily Lemon Liner, DPM      ondansetron  4 mg Intravenous Q6H PRN Lemon Liner, DPM      oxyCODONE  10 mg Oral Q6H PRN Pamela Garcia MD      oxyCODONE  5 mg Oral Q6H PRN Pamela Garcia MD      pantoprazole  20 mg Oral Early Morning Lemon Liner, DPM      polyethylene glycol  17 g Oral Daily PRN Lemon Liner, DPM      sodium chloride  100 mL/hr Intravenous Continuous Lemon Liner,  mL/hr (09/09/20 1007)    tamsulosin  0 4 mg Oral Daily With Argenis Patel MD      vancomycin  12 5 mg/kg (Adjusted) Intravenous Q12H NAILA Suarez 1,250 mg (09/08/20 5164)    verapamil  240 mg Oral Daily Eduardo Vallejo DPM      zolpidem  10 mg Oral HS PRN Eduardo Vallejo DPM          Today, Patient Was Seen By: Regina Marroquin MD    ** Please Note: Dictation voice to text software may have been used in the creation of this document   **

## 2020-09-09 NOTE — PLAN OF CARE
Problem: Potential for Falls  Goal: Patient will remain free of falls  Description: INTERVENTIONS:  - Assess patient frequently for physical needs  -  Identify cognitive and physical deficits and behaviors that affect risk of falls    -  [place] fall precautions as indicated by assessment   - Educate patient/family on patient safety including physical limitations  - Instruct patient to call for assistance with activity based on assessment  - Modify environment to reduce risk of injury  - Consider OT/PT consult to assist with strengthening/mobility  Outcome: Progressing     Problem: PAIN - ADULT  Goal: Verbalizes/displays adequate comfort level or baseline comfort level  Description: Interventions:  - Encourage patient to monitor pain and request assistance  - Assess pain using appropriate pain scale  - Administer analgesics based on type and severity of pain and evaluate response  - Implement non-pharmacological measures as appropriate and evaluate response  - Consider cultural and social influences on pain and pain management  - Notify physician/advanced practitioner if interventions unsuccessful or patient reports new pain  Outcome: Progressing     Problem: INFECTION - ADULT  Goal: Absence or prevention of progression during hospitalization  Description: INTERVENTIONS:  - Assess and monitor for signs and symptoms of infection  - Monitor lab/diagnostic results  - Monitor all insertion sites, i e  indwelling lines, tubes, and drains  - Monitor endotracheal if appropriate and nasal secretions for changes in amount and color  - [place] appropriate cooling/warming therapies per order  - Administer medications as ordered  - Instruct and encourage patient and family to use good hand hygiene technique  - Identify and instruct in appropriate isolation precautions for identified infection/condition  Outcome: Progressing  Goal: Absence of fever/infection during neutropenic period  Description: INTERVENTIONS:  - Monitor WBC    Outcome: Progressing     Problem: SAFETY ADULT  Goal: Patient will remain free of falls  Description: INTERVENTIONS:  - Assess patient frequently for physical needs  -  Identify cognitive and physical deficits and behaviors that affect risk of falls    -  [place] fall precautions as indicated by assessment   - Educate patient/family on patient safety including physical limitations  - Instruct patient to call for assistance with activity based on assessment  - Modify environment to reduce risk of injury  - Consider OT/PT consult to assist with strengthening/mobility  Outcome: Progressing  Goal: Maintain or return to baseline ADL function  Description: INTERVENTIONS:  -  Assess patient's ability to carry out ADLs; assess patient's baseline for ADL function and identify physical deficits which impact ability to perform ADLs (bathing, care of mouth/teeth, toileting, grooming, dressing, etc )  - Assess/evaluate cause of self-care deficits   - Assess range of motion  - Assess patient's mobility; develop plan if impaired  - Assess patient's need for assistive devices and provide as appropriate  - Encourage maximum independence but intervene and supervise when necessary  - Involve family in performance of ADLs  - Assess for home care needs following discharge   - Consider OT consult to assist with ADL evaluation and planning for discharge  - Provide patient education as appropriate  Outcome: Progressing  Goal: Maintain or return mobility status to optimal level  Description: INTERVENTIONS:  - Assess patient's baseline mobility status (ambulation, transfers, stairs, etc )    - Identify cognitive and physical deficits and behaviors that affect mobility  - Identify mobility aids required to assist with transfers and/or ambulation (gait belt, sit-to-stand, lift, walker, cane, etc )  - [place] fall precautions as indicated by assessment  - Record patient progress and toleration of activity level on Mobility SBAR; progress patient to next Phase/Stage  - Instruct patient to call for assistance with activity based on assessment  - Consider rehabilitation consult to assist with strengthening/weightbearing, etc   Outcome: Progressing     Problem: DISCHARGE PLANNING  Goal: Discharge to home or other facility with appropriate resources  Description: INTERVENTIONS:  - Identify barriers to discharge w/patient and caregiver  - Arrange for needed discharge resources and transportation as appropriate  - Identify discharge learning needs (meds, wound care, etc )  - Arrange for interpretive services to assist at discharge as needed  - Refer to Case Management Department for coordinating discharge planning if the patient needs post-hospital services based on physician/advanced practitioner order or complex needs related to functional status, cognitive ability, or social support system  Outcome: Progressing     Problem: Knowledge Deficit  Goal: Patient/family/caregiver demonstrates understanding of disease process, treatment plan, medications, and discharge instructions  Description: Complete learning assessment and assess knowledge base  Interventions:  - Provide teaching at level of understanding  - Provide teaching via preferred learning methods  Outcome: Progressing     Problem: Nutrition/Hydration-ADULT  Goal: Nutrient/Hydration intake appropriate for improving, restoring or maintaining nutritional needs  Description: Monitor and assess patient's nutrition/hydration status for malnutrition  Collaborate with interdisciplinary team and initiate plan and interventions as ordered  Monitor patient's weight and dietary intake as ordered or per policy  Utilize nutrition screening tool and intervene as necessary  Determine patient's food preferences and provide high-protein, high-caloric foods as appropriate       INTERVENTIONS:  - Monitor oral intake, urinary output, labs, and treatment plans  - Assess nutrition and hydration status and recommend course of action  - Evaluate amount of meals eaten  - Assist patient with eating if necessary   - Allow adequate time for meals  - Recommend/ encourage appropriate diets, oral nutritional supplements, and vitamin/mineral supplements  - Order, calculate, and assess calorie counts as needed  - Recommend, monitor, and adjust tube feedings and TPN/PPN based on assessed needs  - Assess need for intravenous fluids  - Provide specific nutrition/hydration education as appropriate  - Include patient/family/caregiver in decisions related to nutrition  Outcome: Progressing     Problem: Prexisting or High Potential for Compromised Skin Integrity  Goal: Skin integrity is maintained or improved  Description: INTERVENTIONS:  - Identify patients at risk for skin breakdown  - Assess and monitor skin integrity  - Assess and monitor nutrition and hydration status  - Monitor labs   - Assess for incontinence   - Turn and reposition patient  - Assist with mobility/ambulation  - Relieve pressure over bony prominences  - Avoid friction and shearing  - Provide appropriate hygiene as needed including keeping skin clean and dry  - Evaluate need for skin moisturizer/barrier cream  - Collaborate with interdisciplinary team   - Patient/family teaching  - Consider wound care consult   Outcome: Progressing

## 2020-09-09 NOTE — PROGRESS NOTES
Vancomycin IV Pharmacy-to-Dose Consultation    Garland Moffett is a 60 y o  male who is currently receiving Vancomycin IV with management by the Pharmacy Consult service  Assessment/Plan:  The patient was reviewed  Renal function is stable and no signs or symptoms of nephrotoxicity and/or infusion reactions were documented in the chart  Based on todays assessment, continue current vancomycin (day # 13) dosing of 1250 mg IV q 12 H, with a plan for trough to be drawn at 1100 on 9/10/20  We will continue to follow the patients culture results and clinical progress daily      Dana Mahmood, Pharmacist

## 2020-09-09 NOTE — PLAN OF CARE
Problem: Potential for Falls  Goal: Patient will remain free of falls  Description: INTERVENTIONS:  - Assess patient frequently for physical needs  -  Identify cognitive and physical deficits and behaviors that affect risk of falls    -  [place] fall precautions as indicated by assessment   - Educate patient/family on patient safety including physical limitations  - Instruct patient to call for assistance with activity based on assessment  - Modify environment to reduce risk of injury  - Consider OT/PT consult to assist with strengthening/mobility  Outcome: Progressing

## 2020-09-09 NOTE — PROGRESS NOTES
Vancomycin IV Pharmacy-to-Dose Consultation    Patricia Shearer is a 60 y o  male who is currently receiving Vancomycin IV with management by the Pharmacy Consult service  Assessment/Plan:  The patient was reviewed  Renal function is stable and no signs or symptoms of nephrotoxicity and/or infusion reactions were documented in the chart  Based on todays assessment, continue current vancomycin (day # 13) dosing of 1250 mg IV q 12 h, with a plan for trough to be drawn at 1730 on 9/9/20  We will continue to follow the patients culture results and clinical progress daily      Nakul Warner, Pharmacist

## 2020-09-09 NOTE — CASE MANAGEMENT
Cm was contacted by [facility] and they are agreeable to accept the pt  Cm will meet with the pt and provide the accepting facilities  The pt is not medically cleared to dc for 24-48 hours

## 2020-09-09 NOTE — DISCHARGE INSTRUCTIONS
Casey Cath Care instructions---Maintain casey catheter to straight drainage  May use leg bag and shower  May flush TID prn using Clarence syringe and 120 ml NSS  May use more saline ad justus to prevent/treat cath obstruction  Remove catheter on 11th day rehab by 0600 hrs  Bladder scan if no void or less than 200ml in 6hrs  Straight cath for bladder scan >350ml and repeat process  If patient requires straight cath x3 , re-insert casey and call for Urologist follow-up  Information above  Casey can remain in place for up to 4 weeks at a time   Casey placed at [facility] on 09/08/2020

## 2020-09-09 NOTE — UTILIZATION REVIEW
Continued Stay Review    Date: 9/9                       Current Patient Class: IP  Current Level of Care: MS     HPI:55 y o  male initially admitted on 8/28 for OM and R hallux amputation     Assessment/Plan: pt w/ worsening creatine , order for central line was placed to insert PICC in an attempt to preserve the arm in case pt creatine deteriorated and were to need dialysis   9/9 Podiatry note   POD #6 right foot TMA  Secondary to nonhealing ulcer and osteomyelitis and uncontrolled diabetes   2) PAD drsg changed w/ xeroform and DSD , serous drainage  Cont IV abx , serial foot exams   9/9 Urology note  acute urinary retention multifactorial and related to patient's acute illness (osteomyelitis) resultant recumbency, recent anesthesia, pain medication and 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  MARKELL mixed etiology   Gross hematuria mild   Maintain casey  9/9 Nephrology note   MARKELL multifactorial , urine lytes   Cont IVF , current creatine 2 07, recheck renal function w/ am labs   Renal US showed no hydronephrosis       Pertinent Labs/Diagnostic Results:   9/8 US kidney and bladder No significant abnormality      Results from last 7 days   Lab Units 09/09/20  0435 09/08/20  0648 09/05/20  0611   WBC Thousand/uL 11 64* 11 44* 15 73*   HEMOGLOBIN g/dL 9 5* 10 1* 10 8*   HEMATOCRIT % 28 4* 29 8* 32 0*   PLATELETS Thousands/uL 334 322 341   NEUTROS ABS Thousands/µL 7 72*  --   --      Results from last 7 days   Lab Units 09/09/20  0431 09/08/20  0648 09/07/20  0447 09/06/20  0657 09/05/20  0611   SODIUM mmol/L 137 139 138 135* 134*   POTASSIUM mmol/L 3 6 3 5 3 1* 3 5 3 8   CHLORIDE mmol/L 102 104 106 101 100   CO2 mmol/L 25 22 22 24 24   ANION GAP mmol/L 10 13 10 10 10   BUN mg/dL 30* 32* 31* 34* 34*   CREATININE mg/dL 2 07* 2 17* 2 03* 2 32* 2 28*   EGFR ml/min/1 73sq m 35 33 35 30 31   CALCIUM mg/dL 8 3 8 8 7 9* 9 0 8 9   MAGNESIUM mg/dL 1 3*  --   --   --   --      Results from last 7 days   Lab Units 09/09/20  0607 09/08/20  2108 09/08/20  1532 09/08/20  1304 09/08/20  7033 09/07/20  2042 09/07/20  1629 09/07/20  1211 09/07/20  0704 09/06/20  2131 09/06/20  1516 09/06/20  1107   POC GLUCOSE mg/dl 123 103 134 154* 103 109 127 190* 203* 250* 181* 239*     Results from last 7 days   Lab Units 09/09/20  0431 09/08/20  0648 09/07/20  0447 09/06/20  0657 09/05/20  0611   GLUCOSE RANDOM mg/dL 138 103 194* 129 181*     Results from last 7 days   Lab Units 09/08/20  1305   SODIUM UR  158   CREATININE UR mg/dL 44 7       Vital Signs:   09/08/20 2238   98 2 °F (36 8 °C)   73   18   161/88   118   95 %   None (Room air         Medications:   Scheduled Medications:  aspirin, 81 mg, Oral, Daily  atorvastatin, 40 mg, Oral, Daily With Dinner  clopidogrel, 75 mg, Oral, Daily  docusate sodium, 100 mg, Oral, BID  heparin (porcine), 5,000 Units, Subcutaneous, Q8H TESS  insulin glargine, 30 Units, Subcutaneous, HS  insulin lispro, 1-5 Units, Subcutaneous, 4x Daily (AC & HS)  insulin lispro, 15 Units, Subcutaneous, TID With Meals  methimazole, 5 mg, Oral, Daily  pantoprazole, 20 mg, Oral, Early Morning  tamsulosin, 0 4 mg, Oral, Daily With Dinner  vancomycin, 12 5 mg/kg (Adjusted), Intravenous, Q12H  verapamil, 240 mg, Oral, Daily    Continuous IV Infusions:  sodium chloride, 100 mL/hr, Intravenous, Continuous    PRN Meds:  aluminum-magnesium hydroxide-simethicone, 30 mL, Oral, Q6H PRN  LORazepam, 0 5 mg, Oral, BID PRN  ondansetron, 4 mg, Intravenous, Q6H PRN  oxyCODONE, 10 mg, Oral, Q6H PRN  oxyCODONE, 5 mg, Oral, Q6H PRN  polyethylene glycol, 17 g, Oral, Daily PRN  zolpidem, 10 mg, Oral, HS PRN    Discharge Plan:D     Network Utilization Review Department  [email or web address]  ATTENTION: Please call with any questions or concerns to [phone] and carefully listen to the prompts so that you are directed to the right person   All voicemails are confidential   Francisco Mooney all requests for admission clinical reviews, approved or denied determinations and any other requests to dedicated fax number below belonging to the campus where the patient is receiving treatment   List of dedicated fax numbers for the Facilities:  [address] [place] [address] DENIALS (Administrative/Medical Necessity) [phone]   [address]  (Maternity/NICU/Pediatrics) [phone]   Gudelia Ang [phone]   [facility] [phone]   Royer Duron [phone]   [place] [address] [phone]   [address] [place] [address] [phone]   [facility]  [phone]   [address] [place] [address], [address]  [address] [facility] [address] [facility] [phone]

## 2020-09-09 NOTE — PROGRESS NOTES
NEPHROLOGY PROGRESS NOTE    Patient: Rupa Hurley               Sex: male          DOA: 8/28/2020  3:14 PM   Date of Birth: @        Age: @        LOS:  LOS: 12 days   9/9/2020    REASON FOR THE CONSULTATION:  Further management of MARKELL  HPI     This is a 60 y o  male admitted for Osteomyelitis ([facility] [address] )     SUBJECTIVE     - updated patient's wife at bedside today  Mae catheter was placed and found to have hematuria after catheter placement  Patient was seen by urology team this morning and I have personally reviewed their note with the recommendations  Currently breathing is stable  Patient denies nausea, vomiting, headache or dizziness today    - Reviewed last 24 hrs events     CURRENT MEDICATIONS       Current Facility-Administered Medications:     aluminum-magnesium hydroxide-simethicone (MYLANTA) 200-200-20 mg/5 mL oral suspension 30 mL, 30 mL, Oral, Q6H PRN, Lyn Lewis DPM    aspirin chewable tablet 81 mg, 81 mg, Oral, Daily, Lyn Lewis DPM, 81 mg at 09/09/20 0857    atorvastatin (LIPITOR) tablet 40 mg, 40 mg, Oral, Daily With Marsha Aydee, DPM, 40 mg at 09/08/20 1623    clopidogrel (PLAVIX) tablet 75 mg, 75 mg, Oral, Daily, Lyn Lewis DPMIKE, 75 mg at 09/09/20 0858    docusate sodium (COLACE) capsule 100 mg, 100 mg, Oral, BID, Jonas Menezes MD, 100 mg at 09/09/20 0856    heparin (porcine) subcutaneous injection 5,000 Units, 5,000 Units, Subcutaneous, Q8H [facility], NAILA Suarez    insulin glargine (LANTUS) subcutaneous injection 30 Units 0 3 mL, 30 Units, Subcutaneous, HS, Jonas Menezes MD, 15 Units at 09/07/20 9257    insulin lispro (HumaLOG) 100 units/mL subcutaneous injection 1-5 Units, 1-5 Units, Subcutaneous, 4x Daily (AC & HS), 1 Units at 09/07/20 1212 **AND** Fingerstick Glucose (POCT), , , 4x Daily AC and at bedtime, Lyn Lewis DPM    insulin lispro (HumaLOG) 100 units/mL subcutaneous injection 15 Units, 15 Units, Subcutaneous, TID With Meals, Lyn Lewis, DPM, 15 Units at 09/09/20 0859    LORazepam (ATIVAN) tablet 0 5 mg, 0 5 mg, Oral, BID PRN, Lemon Liner, DPM    magnesium sulfate 2 g/50 mL IVPB (premix) 2 g, 2 g, Intravenous, Once, Gerhard Hamilton MD, Last Rate: 25 mL/hr at 09/09/20 1042, 2 g at 09/09/20 1042    methimazole (TAPAZOLE) tablet 5 mg, 5 mg, Oral, Daily, Lemon Liner, DPM, 5 mg at 09/09/20 0858    ondansetron (ZOFRAN) injection 4 mg, 4 mg, Intravenous, Q6H PRN, Lemon Liner, DPM, 4 mg at 09/03/20 1848    oxyCODONE (ROXICODONE) immediate release tablet 10 mg, 10 mg, Oral, Q6H PRN, Pamela Garcia MD, 10 mg at 09/03/20 1432    oxyCODONE (ROXICODONE) IR tablet 5 mg, 5 mg, Oral, Q6H PRN, Pamela Garcia MD    pantoprazole (PROTONIX) EC tablet 20 mg, 20 mg, Oral, Early Morning, Lemon Liner, DPM, 20 mg at 09/09/20 0606    polyethylene glycol (MIRALAX) packet 17 g, 17 g, Oral, Daily PRN, Lemon Liner, DPM    sodium chloride 0 9 % infusion, 100 mL/hr, Intravenous, Continuous, Lemon Liner, DPM, Last Rate: 100 mL/hr at 09/09/20 1007, 100 mL/hr at 09/09/20 1007    tamsulosin (FLOMAX) capsule 0 4 mg, 0 4 mg, Oral, Daily With Marisol Pitts MD, 0 4 mg at 09/08/20 1623    vancomycin (VANCOCIN) 1,250 mg in sodium chloride 0 9 % 250 mL IVPB, 12 5 mg/kg (Adjusted), Intravenous, Q12H, NAILA Suarez, Last Rate: 166 7 mL/hr at 09/08/20 2253, 1,250 mg at 09/08/20 2253    verapamil (CALAN-SR) CR tablet 240 mg, 240 mg, Oral, Daily, Lemon Liner, DPM, 240 mg at 09/09/20 0856    zolpidem (AMBIEN) tablet 10 mg, 10 mg, Oral, HS PRN, Lemon Liner, DPM, 10 mg at 08/31/20 2322    OBJECTIVE     Current Weight: Weight - Scale: 115 kg (253 lb 12 oz)  /88 (BP Location: Right arm)   Pulse 73   Temp 98 2 °F (36 8 °C) (Oral)   Resp 18   Ht 6' 4" (1 93 m)   Wt 115 kg (253 lb 12 oz)   SpO2 95%   BMI 30 89 kg/m²   Vitals:    09/08/20 2238   BP: 161/88   Pulse: 73   Resp: 18   Temp: 98 2 °F (36 8 °C)   SpO2: 95%     Body mass index is 30 89 kg/m²  Intake/Output Summary (Last 24 hours) at 9/9/2020 1103  Last data filed at 9/9/2020 1007  Gross per 24 hour   Intake 2981 67 ml   Output 1600 ml   Net 1381 67 ml       PHYSICAL EXAMINATION     Physical Exam  Constitutional:       General: He is not in acute distress  HENT:      Head: Normocephalic and atraumatic  Eyes:      General: No scleral icterus  Neck:      Musculoskeletal: Neck supple  Vascular: No JVD  Cardiovascular:      Rate and Rhythm: Normal rate  Pulmonary:      Effort: No accessory muscle usage or respiratory distress  Breath sounds: No wheezing  Abdominal:      General: There is no distension  Palpations: Abdomen is soft  Musculoskeletal:      Right hand: He exhibits no laceration  Left hand: He exhibits no laceration  Skin:     General: Skin is warm  Neurological:      Mental Status: He is alert  Psychiatric:         Speech: He is communicative  Behavior: Behavior is not combative             LAB RESULTS     Results from last 7 days   Lab Units 09/09/20  0435 09/09/20  0431 09/08/20  0648 09/07/20  0447 09/06/20  0657 09/05/20  0611   WBC Thousand/uL 11 64*  --  11 44*  --   --  15 73*   HEMOGLOBIN g/dL 9 5*  --  10 1*  --   --  10 8*   HEMATOCRIT % 28 4*  --  29 8*  --   --  32 0*   PLATELETS Thousands/uL 334  --  322  --   --  341   SODIUM mmol/L  --  137 139 138 135* 134*   POTASSIUM mmol/L  --  3 6 3 5 3 1* 3 5 3 8   CHLORIDE mmol/L  --  102 104 106 101 100   CO2 mmol/L  --  25 22 22 24 24   BUN mg/dL  --  30* 32* 31* 34* 34*   CREATININE mg/dL  --  2 07* 2 17* 2 03* 2 32* 2 28*   EGFR ml/min/1 73sq m  --  35 33 35 30 31   CALCIUM mg/dL  --  8 3 8 8 7 9* 9 0 8 9   MAGNESIUM mg/dL  --  1 3*  --   --   --   --        I have personally reviewed the old medical records and patient's previously known baseline creatinine level is ~ 0 8-0 9    RADIOLOGY RESULTS      US kidney and bladder   Final Result by Antoinette Huang MD (09/08 2048) No significant abnormality       Workstation performed: [id number]         XR foot 3+ vw right   Final Result by Roberto Saenz MD (09/04 0602)      Interval first through fourth transmetatarsal and fifth toe amputation with expected postsurgical changes  No radiographic evidence of osteomyelitis  Workstation performed: [id number]         VAS lower limb arterial duplex, limited, unilateral   Final Result by Iglesia Saavedra DO (09/01 0913)      MRI foot/forefoot toes right w wo contrast   Final Result by Roberto Saenz MD (08/31 2032)      Soft tissue wound/ulcer adjacent to distal first metatarsal with underlying phlegmonous cellulitis with a thin tapering tail extending towards the distal residual flexor hallucis longus tendon  New osteomyelitis of the distal residual first metatarsal tapering proximally to the level of the base  Reactive changes in the hallux tibial sesamoid  New osteomyelitis medial aspect of the second metatarsal head and base of the second proximal phalanx  New distal flexor hallucis longus tenosynovitis extending to the level of the mid foot  Infectious tenosynovitis not excluded  The study was marked in [facility] for immediate notification  Workstation performed: [id number]         XR foot 3+ vw right   Final Result by Roberto Saenz MD (08/31 0802)      Interval development of radiographic evidence of osteomyelitis involving the distal first metatarsal       The study was marked in EPIC for immediate notification  Workstation performed: [id number]         IR tunneled central line placement    (Results Pending)       PLAN / RECOMMENDATIONS      1  MARKELL  Multifactorial, urine lytes were intrinsic in nature and MARKELL was suspected due to ATN on top of urinary retention  Upon review of old medical records, previously known baseline creatinine is around 0 8-0 9    Now have indwelling Mae catheter in place and currently patient is nonoliguric although found to have hematuria and currently been followed by urology team   No plan for CBI at this point  Continue IV fluid at this point  Overnight renal function has also improved to current creatinine of 2 07  Recheck renal function with AM labs  Renal ultrasound done yesterday showed no hydronephrosis  2  Hypomagnesemia  Current magnesium level is 1 3 which is below the goal and plan magnesium sulfate 2 g IV x1 dose today and recheck magnesium level with AM labs  3  Anemia  Multifactorial with current hemoglobin of 9 5  No active signs of bleeding seen overnight  Monitor hemoglobin level and consider blood transfusion if hemoglobin level drops below 7  Overall above mentioned plan was also d/w Tenisha Machado MD  Nephrology  9/9/2020        Portions of the record may have been created with voice recognition software  Occasional wrong word or "sound a like" substitutions may have occurred due to the inherent limitations of voice recognition software  Read the chart carefully and recognize, using context, where substitutions have occurred

## 2020-09-10 LAB
ANION GAP SERPL CALCULATED.3IONS-SCNC: 10 MMOL/L (ref 4–13)
BASOPHILS # BLD AUTO: 0.07 THOUSANDS/ΜL (ref 0–0.1)
BASOPHILS NFR BLD AUTO: 1 % (ref 0–1)
BUN SERPL-MCNC: 27 MG/DL (ref 5–25)
CALCIUM SERPL-MCNC: 8.6 MG/DL (ref 8.3–10.1)
CHLORIDE SERPL-SCNC: 103 MMOL/L (ref 100–108)
CO2 SERPL-SCNC: 26 MMOL/L (ref 21–32)
CREAT SERPL-MCNC: 2.14 MG/DL (ref 0.6–1.3)
EOSINOPHIL # BLD AUTO: 0.42 THOUSAND/ΜL (ref 0–0.61)
EOSINOPHIL NFR BLD AUTO: 4 % (ref 0–6)
ERYTHROCYTE [DISTWIDTH] IN BLOOD BY AUTOMATED COUNT: 12 % (ref 11.6–15.1)
GFR SERPL CREATININE-BSD FRML MDRD: 33 ML/MIN/1.73SQ M
GLUCOSE SERPL-MCNC: 103 MG/DL (ref 65–140)
GLUCOSE SERPL-MCNC: 114 MG/DL (ref 65–140)
GLUCOSE SERPL-MCNC: 115 MG/DL (ref 65–140)
GLUCOSE SERPL-MCNC: 221 MG/DL (ref 65–140)
GLUCOSE SERPL-MCNC: 91 MG/DL (ref 65–140)
HCT VFR BLD AUTO: 28.2 % (ref 36.5–49.3)
HGB BLD-MCNC: 9.4 G/DL (ref 12–17)
IMM GRANULOCYTES # BLD AUTO: 0.08 THOUSAND/UL (ref 0–0.2)
IMM GRANULOCYTES NFR BLD AUTO: 1 % (ref 0–2)
LYMPHOCYTES # BLD AUTO: 2.29 THOUSANDS/ΜL (ref 0.6–4.47)
LYMPHOCYTES NFR BLD AUTO: 19 % (ref 14–44)
MAGNESIUM SERPL-MCNC: 1.8 MG/DL (ref 1.6–2.6)
MCH RBC QN AUTO: 29.7 PG (ref 26.8–34.3)
MCHC RBC AUTO-ENTMCNC: 33.3 G/DL (ref 31.4–37.4)
MCV RBC AUTO: 89 FL (ref 82–98)
MONOCYTES # BLD AUTO: 1.33 THOUSAND/ΜL (ref 0.17–1.22)
MONOCYTES NFR BLD AUTO: 11 % (ref 4–12)
NEUTROPHILS # BLD AUTO: 7.67 THOUSANDS/ΜL (ref 1.85–7.62)
NEUTS SEG NFR BLD AUTO: 64 % (ref 43–75)
NRBC BLD AUTO-RTO: 0 /100 WBCS
PLATELET # BLD AUTO: 334 THOUSANDS/UL (ref 149–390)
PMV BLD AUTO: 10.6 FL (ref 8.9–12.7)
POTASSIUM SERPL-SCNC: 3.5 MMOL/L (ref 3.5–5.3)
RBC # BLD AUTO: 3.17 MILLION/UL (ref 3.88–5.62)
SODIUM SERPL-SCNC: 139 MMOL/L (ref 136–145)
VANCOMYCIN TROUGH SERPL-MCNC: 28.9 UG/ML (ref 10–20)
WBC # BLD AUTO: 11.86 THOUSAND/UL (ref 4.31–10.16)

## 2020-09-10 PROCEDURE — 80202 ASSAY OF VANCOMYCIN: CPT | Performed by: NURSE PRACTITIONER

## 2020-09-10 PROCEDURE — 82948 REAGENT STRIP/BLOOD GLUCOSE: CPT

## 2020-09-10 PROCEDURE — 85025 COMPLETE CBC W/AUTO DIFF WBC: CPT | Performed by: INTERNAL MEDICINE

## 2020-09-10 PROCEDURE — 80048 BASIC METABOLIC PNL TOTAL CA: CPT | Performed by: INTERNAL MEDICINE

## 2020-09-10 PROCEDURE — 99232 SBSQ HOSP IP/OBS MODERATE 35: CPT | Performed by: INTERNAL MEDICINE

## 2020-09-10 PROCEDURE — 99233 SBSQ HOSP IP/OBS HIGH 50: CPT | Performed by: INTERNAL MEDICINE

## 2020-09-10 PROCEDURE — 83735 ASSAY OF MAGNESIUM: CPT | Performed by: INTERNAL MEDICINE

## 2020-09-10 RX ADMIN — VERAPAMIL HYDROCHLORIDE 240 MG: 120 TABLET, FILM COATED, EXTENDED RELEASE ORAL at 08:54

## 2020-09-10 RX ADMIN — TAMSULOSIN HYDROCHLORIDE 0.4 MG: 0.4 CAPSULE ORAL at 18:26

## 2020-09-10 RX ADMIN — ASPIRIN 81 MG 81 MG: 81 TABLET ORAL at 08:53

## 2020-09-10 RX ADMIN — METHIMAZOLE 5 MG: 5 TABLET ORAL at 08:53

## 2020-09-10 RX ADMIN — DOCUSATE SODIUM 100 MG: 100 CAPSULE, LIQUID FILLED ORAL at 08:53

## 2020-09-10 RX ADMIN — INSULIN LISPRO 15 UNITS: 100 INJECTION, SOLUTION INTRAVENOUS; SUBCUTANEOUS at 12:33

## 2020-09-10 RX ADMIN — INSULIN LISPRO 15 UNITS: 100 INJECTION, SOLUTION INTRAVENOUS; SUBCUTANEOUS at 18:27

## 2020-09-10 RX ADMIN — INSULIN LISPRO 15 UNITS: 100 INJECTION, SOLUTION INTRAVENOUS; SUBCUTANEOUS at 08:56

## 2020-09-10 RX ADMIN — CLOPIDOGREL BISULFATE 75 MG: 75 TABLET ORAL at 08:53

## 2020-09-10 RX ADMIN — INSULIN LISPRO 2 UNITS: 100 INJECTION, SOLUTION INTRAVENOUS; SUBCUTANEOUS at 21:36

## 2020-09-10 RX ADMIN — INSULIN GLARGINE 30 UNITS: 100 INJECTION, SOLUTION SUBCUTANEOUS at 21:35

## 2020-09-10 RX ADMIN — ATORVASTATIN CALCIUM 40 MG: 40 TABLET, FILM COATED ORAL at 18:26

## 2020-09-10 RX ADMIN — HEPARIN SODIUM 5000 UNITS: 5000 INJECTION INTRAVENOUS; SUBCUTANEOUS at 21:35

## 2020-09-10 RX ADMIN — PANTOPRAZOLE SODIUM 20 MG: 20 TABLET, DELAYED RELEASE ORAL at 05:21

## 2020-09-10 NOTE — PROGRESS NOTES
Progress Note - Infectious Disease   Isi Antoine 60 y o  male MRN: [id number]  Unit/Bed#: -01 Encounter: [id number]         A/P:  1  MRSA bacteremia   Secondary to #2/3   Negative 2D echo   No intravascular prosthetic devices   Repeat blood culture are negative      -continue IV vancomycin with dosing recommendations per pharmacy  Vancomycin level is now supratherapeutic  Dose has been adjusted by pharmacy  -plan for 4 week course of IV antibiotics from negative blood culture, through 9/27   -check weekly CBC with diff, creatinine, vancomycin level  -outpatient ID follow-up after hospital discharge  -okay to proceed with line placement at any time     2  Right foot cellulitis   Suspect secondary to MRSA   Wound culture also shows Morganella, Enterobacter, Enterococcus, which is not unexpected finding in the setting of chronic ulceration   Would continue targeted therapy for MRSA infection   Now status post TMA     -antibiotic plan as above  -serial foot exams     3  Right foot nonhealing ulceration   With residual 1st and 2nd toe osteomyelitis   Podiatry input noted  Now s/p TMA on 09/03 with presumed surgical cure of osteomyelitis      -antibiotic plan as above  -daily local wound care and dressing changes  -outpatient podiatry follow-up     4  Status post 1st digit amputation on 08/11/2020   Complicated by above      5  Acute kidney injury   Creatinine has trended up and remains stable around 2, with baseline around 0 8-0 9  Nephrology input noted with lower suspicion for vancomycin toxicity   Consider secondary to urinary retention  Now status post Mae catheter placement  No acute findings on renal ultrasound      -dose adjust antibiotic based on renal function, as above   -management of Mae catheter per Urology  -nephrology follow-up ongoing  -monitor BMP closely     6  PAD   Status post recent revascularization   Vascular surgery input noted      7   Uncontrolled diabetes mellitus   Risk factor for severe infection      8  Leukocytosis  Likely reactive in the immediate postoperative setting   Remains afebrile   Blood cultures showed clearance of bacteremia   WBC count has improved and remains stable      -antibiotic plan as above  -monitor CBC  -monitor temperatures     Vancomycin  POD7         Subjective: Mae remains in place  No fevers, chills, nausea or vomiting  No pain  Objective:  Vitals:  Temp:  [97 5 °F (36 4 °C)-98 4 °F (36 9 °C)] 97 5 °F (36 4 °C)  HR:  [85] 85  Resp:  [16-18] 18  BP: (111-140)/(70-88) 138/88  SpO2:  [95 %-98 %] 95 %  Temp (24hrs), Av 9 °F (36 6 °C), Min:97 5 °F (36 4 °C), Max:98 4 °F (36 9 °C)  Current: Temperature: 97 5 °F (36 4 °C)    Physical Exam:   General:  No acute distress  Psychiatric:  Awake and alert  Pulmonary:  Normal respiratory excursion without accessory muscle use  Abdomen:  Soft, nontender  Mae in place with clear yellow urine  Extremities:  No edema  Skin:  No rashes  Foot dressing intact with no ascending erythema    Lab Results:  I have personally reviewed pertinent labs  Results from last 7 days   Lab Units 09/10/20  0522 09/09/20  0431 20  0648   POTASSIUM mmol/L 3 5 3 6 3 5   CHLORIDE mmol/L 103 102 104   CO2 mmol/L 26 25 22   BUN mg/dL 27* 30* 32*   CREATININE mg/dL 2 14* 2 07* 2 17*   EGFR ml/min/1 73sq m 33 35 33   CALCIUM mg/dL 8 6 8 3 8 8     Results from last 7 days   Lab Units 09/10/20  0522 09/09/20  0435 20  0648   WBC Thousand/uL 11 86* 11 64* 11 44*   HEMOGLOBIN g/dL 9 4* 9 5* 10 1*   PLATELETS Thousands/uL 334 334 322           Imaging Studies:   I have personally reviewed pertinent imaging study reports and images in PACS  EKG, Pathology, and Other Studies:   I have personally reviewed pertinent reports

## 2020-09-10 NOTE — PROGRESS NOTES
NEPHROLOGY PROGRESS NOTE    Patient: Livia Wright               Sex: male          DOA: 8/28/2020  3:14 PM   Date of Birth: @        Age: @        LOS:  LOS: 15 days   9/10/2020    REASON FOR THE CONSULTATION:  Further management of MARKELL  HPI     This is a 60 y o  male admitted for Osteomyelitis ([facility] [address] )     SUBJECTIVE     - currently nonoliguric  Urine is now clear in color  Indwelling Mae catheter remained in place  Patient denies nausea, vomiting, headache or dizziness today    - Reviewed last 24 hrs events     CURRENT MEDICATIONS       Current Facility-Administered Medications:     aluminum-magnesium hydroxide-simethicone (MYLANTA) 200-200-20 mg/5 mL oral suspension 30 mL, 30 mL, Oral, Q6H PRN, Ulice Jerrica, DPM    aspirin chewable tablet 81 mg, 81 mg, Oral, Daily, Ulice Jerrica, DPM, 81 mg at 09/10/20 0853    atorvastatin (LIPITOR) tablet 40 mg, 40 mg, Oral, Daily With Yulissa Medina, DPM, 40 mg at 09/09/20 1656    clopidogrel (PLAVIX) tablet 75 mg, 75 mg, Oral, Daily, Ulice Jerrica, DPM, 75 mg at 09/10/20 0853    docusate sodium (COLACE) capsule 100 mg, 100 mg, Oral, BID, Elfego Ann MD, 100 mg at 09/10/20 0853    heparin (porcine) subcutaneous injection 5,000 Units, 5,000 Units, Subcutaneous, Q8H [address], CuiNAILA Jean    insulin glargine (LANTUS) subcutaneous injection 30 Units 0 3 mL, 30 Units, Subcutaneous, HS, Elfego Ann MD, 15 Units at 09/07/20 2257    insulin lispro (HumaLOG) 100 units/mL subcutaneous injection 1-5 Units, 1-5 Units, Subcutaneous, 4x Daily (AC & HS), 1 Units at 09/07/20 1212 **AND** Fingerstick Glucose (POCT), , , 4x Daily AC and at bedtime, Ulice Jerrica, DPM    insulin lispro (HumaLOG) 100 units/mL subcutaneous injection 15 Units, 15 Units, Subcutaneous, TID With Meals, Ulice Jerrica, DPM, 15 Units at 09/10/20 0856    LORazepam (ATIVAN) tablet 0 5 mg, 0 5 mg, Oral, BID PRN, Ulice Jerrica, DPM    methimazole (TAPAZOLE) tablet 5 mg, 5 mg, Oral, Daily, Jelm Ulmer, DPM, 5 mg at 09/10/20 0853    ondansetron [facility]) injection 4 mg, 4 mg, Intravenous, Q6H PRN, Jelm Ulmer, DPM, 4 mg at 09/03/20 1848    oxyCODONE (ROXICODONE) immediate release tablet 10 mg, 10 mg, Oral, Q6H PRN, Clinton Greenfield MD, 10 mg at 09/03/20 1432    oxyCODONE (ROXICODONE) IR tablet 5 mg, 5 mg, Oral, Q6H PRN, Clinton Greenfield MD    pantoprazole (PROTONIX) EC tablet 20 mg, 20 mg, Oral, Early Morning, Anahy Ulmer, DPM, 20 mg at 09/10/20 0521    polyethylene glycol (MIRALAX) packet 17 g, 17 g, Oral, Daily PRN, Anahy Ulmer, DPM    sodium chloride 0 9 % infusion, 100 mL/hr, Intravenous, Continuous, Jelmarmida Gaxiolas, DPM, Stopped at 09/10/20 1845    tamsulosin (FLOMAX) capsule 0 4 mg, 0 4 mg, Oral, Daily With Wendi Covarrubias MD, 0 4 mg at 09/09/20 1656    vancomycin (VANCOCIN) 1,250 mg in sodium chloride 0 9 % 250 mL IVPB, 12 5 mg/kg (Adjusted), Intravenous, Q12H, NAIAL Suarez, Last Rate: 166 7 mL/hr at 09/09/20 2317, 1,250 mg at 09/09/20 2317    verapamil (CALAN-SR) CR tablet 240 mg, 240 mg, Oral, Daily, Jelm Ulmer, DPM, 240 mg at 09/10/20 0854    zolpidem (AMBIEN) tablet 10 mg, 10 mg, Oral, HS PRN, Anahy Ulmer, DPM, 10 mg at 08/31/20 2322    OBJECTIVE     Current Weight: Weight - Scale: 115 kg (253 lb 12 oz)  /88 (BP Location: Left arm)   Pulse 85   Temp 97 5 °F (36 4 °C) (Oral)   Resp 18   Ht 6' 4" (1 93 m)   Wt 115 kg (253 lb 12 oz)   SpO2 95%   BMI 30 89 kg/m²   Vitals:    09/10/20 0900   BP: 138/88   Pulse: 85   Resp: 18   Temp: 97 5 °F (36 4 °C)   SpO2: 95%     Body mass index is 30 89 kg/m²  Intake/Output Summary (Last 24 hours) at 9/10/2020 1156  Last data filed at 9/10/2020 0900  Gross per 24 hour   Intake 2196 67 ml   Output 1570 ml   Net 626 67 ml       PHYSICAL EXAMINATION     Physical Exam  Constitutional:       General: He is not in acute distress  HENT:      Head: Normocephalic and atraumatic     Eyes:      General: No scleral icterus  Neck:      Musculoskeletal: Neck supple  Vascular: No JVD  Cardiovascular:      Rate and Rhythm: Normal rate  Pulmonary:      Effort: No accessory muscle usage or respiratory distress  Breath sounds: No wheezing  Abdominal:      General: There is no distension  Palpations: Abdomen is soft  Comments: Mae catheter in place with urine clear in color   Musculoskeletal:      Right hand: He exhibits no laceration  Left hand: He exhibits no laceration  Skin:     General: Skin is warm  Neurological:      Mental Status: He is alert  Psychiatric:         Speech: He is communicative  Behavior: Behavior is not combative             LAB RESULTS     Results from last 7 days   Lab Units 09/10/20  0522 09/09/20  0435 09/09/20  0431 09/08/20  6120 09/07/20  0447 09/06/20  0657 09/05/20  0611   WBC Thousand/uL 11 86* 11 64*  --  11 44*  --   --  15 73*   HEMOGLOBIN g/dL 9 4* 9 5*  --  10 1*  --   --  10 8*   HEMATOCRIT % 28 2* 28 4*  --  29 8*  --   --  32 0*   PLATELETS Thousands/uL 334 334  --  322  --   --  341   SODIUM mmol/L 139  --  137 139 138 135* 134*   POTASSIUM mmol/L 3 5  --  3 6 3 5 3 1* 3 5 3 8   CHLORIDE mmol/L 103  --  102 104 106 101 100   CO2 mmol/L 26  --  25 22 22 24 24   BUN mg/dL 27*  --  30* 32* 31* 34* 34*   CREATININE mg/dL 2 14*  --  2 07* 2 17* 2 03* 2 32* 2 28*   EGFR ml/min/1 73sq m 33  --  35 33 35 30 31   CALCIUM mg/dL 8 6  --  8 3 8 8 7 9* 9 0 8 9   MAGNESIUM mg/dL 1 8  --  1 3*  --   --   --   --        I have personally reviewed the old medical records and patient's previously known baseline creatinine level is ~ 0 8-0 9    RADIOLOGY RESULTS      US kidney and bladder   Final Result by Christina Burdick MD (09/08 2048)      No significant abnormality       Workstation performed: [id number]         IR tunneled central line placement   Final Result by Sumeet Nelson MD (09/09 1823)   Impression: Successful ultrasound and fluoroscopically guided placement of 6-Arabic, dual-lumen tunneled central venous catheter  Workstation performed: [id number]         XR foot 3+ vw right   Final Result by Alisa Frazier MD (09/04 0602)      Interval first through fourth transmetatarsal and fifth toe amputation with expected postsurgical changes  No radiographic evidence of osteomyelitis  Workstation performed: [id number]         VAS lower limb arterial duplex, limited, unilateral   Final Result by Catarino Soto DO (09/01 0913)      MRI foot/forefoot toes right w wo contrast   Final Result by Alisa Frazier MD (08/31 9781)      Soft tissue wound/ulcer adjacent to distal first metatarsal with underlying phlegmonous cellulitis with a thin tapering tail extending towards the distal residual flexor hallucis longus tendon  New osteomyelitis of the distal residual first metatarsal tapering proximally to the level of the base  Reactive changes in the hallux tibial sesamoid  New osteomyelitis medial aspect of the second metatarsal head and base of the second proximal phalanx  New distal flexor hallucis longus tenosynovitis extending to the level of the mid foot  Infectious tenosynovitis not excluded  The study was marked in [facility] for immediate notification  Workstation performed: [id number]         XR foot 3+ vw right   Final Result by Alisa Frazier MD (08/31 0802)      Interval development of radiographic evidence of osteomyelitis involving the distal first metatarsal       The study was marked in EPIC for immediate notification  Workstation performed: [id number]             PLAN / RECOMMENDATIONS      1  MARKELL  Multifactorial, urine lytes were intrinsic in nature in MARKELL was suspected due to ATN in the setting of bacteremia  Upon review of old medical records, previously known baseline creatinine is around 0 8-0 9    Currently patient is nonoliguric and renal function has failed to improve further with current creatinine of 2 14  Encouraged patient to increase oral fluid intake  Will plan to stop IV fluid today  Recheck renal function with AM labs  Patient was also found to have urinary retention and now have indwelling Mae catheter in place and need to have outpatient Urology follow-up for Mae care  2  Anemia  Multifactorial with current hemoglobin of 9 4  No active signs of bleeding seen overnight  Consider blood transfusion if hemoglobin level drops below 7  Overall above mentioned plan was also d/w Vega Ontiveros MD  Nephrology  9/10/2020        Portions of the record may have been created with voice recognition software  Occasional wrong word or "sound a like" substitutions may have occurred due to the inherent limitations of voice recognition software  Read the chart carefully and recognize, using context, where substitutions have occurred

## 2020-09-10 NOTE — PROGRESS NOTES
Progress Note - Isi Antoine 1965, 60 y o  male MRN: [id number]    Unit/Bed#: -01 Encounter: [id number]    Primary Care Provider: Claude Decree, MD   Date and time admitted to hospital: 8/28/2020  3:14 PM        Urinary retention  Assessment & Plan  Required 3 straight straight catheterization,yielded 500 - 700ml urine  Mae inserted today per protocol  Started Flomax  Patient denies constipation  Consult Urology    Acute kidney injury (MARKELL) with acute tubular necrosis (ATN) ([facility] [address] )  Assessment & Plan  Likely secondary to sepsis/surgery,+/- urinary retention  Creatinine peaked to 2 32 on 9/5  Baseline appears to be 0 8-0 9  Creatinine currently stable  Patient required 3 straight catheterization,Mae inserted per protocol today  Started Flomax   Continue IV fluids  Avoid nephrotoxin and hypotension  Consulted Nephrology, appreciate input  Repeat BMP in a m  MRSA bacteremia  Assessment & Plan  2/2 culture from 08/28/2020 growing MRSA  Likely source foot wound  Negative 2D echo  Repeat blood cultures remain negative  Continue IV vancomycin total of 4 weeks through 9/27  Weekly CBC with diff, creatinine, vancomycin level  Outpatient ID follow-up after hospital discharge  Vascular surgery, Podiatry following  ID recommendations appreciated  Pharmacy consult for vancomycin management  Amputation of right great toe [facility])  Assessment & Plan  Now presented again with poor wound healing, wound dehiscence, possible cellulitis, osteomyelitis  Plan is as stated above under 1  And 2  PAD (peripheral artery disease) [facility])  Assessment & Plan  Patient history of recent right popliteal PTA/ stent 08/06/2020 and right hallux amputation  Now noted with osteomyelitis as evident on right foot MRI report  Cleared by vascular surgery for further debridement and/or amputation  Continue aspirin, statin, Plavix    Follow-up with vascular surgery recommendation as well as podiatry following  Hyperthyroidism  Assessment & Plan  · Diagnosed with thyroid cancer in July  · TSH 0 080  · Free T4 1 39  · Continue home dose of methimazole 5 mg daily  · Recommended close outpatient follow-up  Type 2 diabetes mellitus with diabetic polyneuropathy, with long-term current use of insulin [facility])  Assessment & Plan  Lab Results   Component Value Date    HGBA1C 8 1 (H) 08/28/2020       Recent Labs     09/09/20  1330 09/09/20  1515 09/09/20  2111 09/10/20  0526   POCGLU 132 98 81 115       Blood Sugar Average: Last 72 hrs:  · (P) 333 [id number]     Uncontrolled  Hemoglobin A1c 8 1  Blood sugars improving  Continue Lantus 30 units subcu HS, Humalog 15 units t i d  Continue SSI  Continue to monitor sugar        Hyperlipidemia  Assessment & Plan  · Continue statin    Gastroesophageal reflux disease without esophagitis  Assessment & Plan  · Continue PPI    Hypertension, essential  Assessment & Plan  · Controlled  · Continue verapamil  Avalide held secondary to acute kidney injury  · Continue to monitor blood pressure closely  · BP stable      * Osteomyelitis (HCC)  Assessment & Plan  Recent history of right hallux amputation  Presented with wound dehiscence, wound cellulitis, possible osteomyelitis  2/2 blood culture from 08/28/2020 growing MRSA  Right foot MRI report reviewed and noted for osteomyelitis  Podiatry recommendations appreciated  Pod # 5 status post right-sided TMA  Continue IV vancomycin for total of 4 weeks through 9/27  Due to patient's worsening creatinine order for central line by IR was placed insert of PICC line in an attempt to preserve the arm in case patient's creatinine deteriorates and were to need dialysis  Pending today        Currently afebrile, hemodynamically stable  Encourage for strict nonweightbearing to right lower extremity at this time  Continue dressing changes per podiatrist recommendation            VTE Pharmacologic Prophylaxis:   Pharmacologic: Heparin  Mechanical VTE Prophylaxis in Place: Yes    Patient Centered Rounds: I have performed bedside rounds with nursing staff today  Discussions with Specialists or Other Care Team Provider: cm, nursing    Education and Discussions with Family / Patient: patient    Time Spent for Care: 30 minutes  More than 50% of total time spent on counseling and coordination of care as described above  Current Length of Stay: 13 day(s)    Current Patient Status: Inpatient   Certification Statement: The patient will continue to require additional inpatient hospital stay due to MARKELL and still requiring placement to rehab    Discharge Plan:  Once MARKELL improves  Also will require placement to short-term rehab    Code Status: Level 1 - Full Code      Subjective:   Patient seen examined  No acute overnight events  No acute complaints currently  Denies chest pain, shortness breath cough, fevers, chills    Objective:     Vitals:   Temp (24hrs), Av 9 °F (36 6 °C), Min:97 5 °F (36 4 °C), Max:98 4 °F (36 9 °C)    Temp:  [97 5 °F (36 4 °C)-98 4 °F (36 9 °C)] 97 5 °F (36 4 °C)  HR:  [85] 85  Resp:  [16-18] 18  BP: (111-140)/(70-88) 138/88  SpO2:  [95 %-98 %] 95 %  Body mass index is 30 89 kg/m²  Input and Output Summary (last 24 hours): Intake/Output Summary (Last 24 hours) at 9/10/2020 1008  Last data filed at 9/10/2020 0900  Gross per 24 hour   Intake 2196 67 ml   Output 1570 ml   Net 626 67 ml       Physical Exam:     Physical Exam  Constitutional:       General: He is not in acute distress  Appearance: He is well-developed  He is not diaphoretic  HENT:      Head: Normocephalic and atraumatic  Nose: Nose normal       Mouth/Throat:      Pharynx: No oropharyngeal exudate  Eyes:      General: No scleral icterus  Conjunctiva/sclera: Conjunctivae normal    Neck:      Musculoskeletal: Normal range of motion and neck supple  Cardiovascular:      Rate and Rhythm: Normal rate and regular rhythm  Heart sounds: Normal heart sounds  No murmur  No friction rub  No gallop  Pulmonary:      Effort: Pulmonary effort is normal  No respiratory distress  Breath sounds: Normal breath sounds  No wheezing or rales  Chest:      Chest wall: No tenderness  Abdominal:      General: Bowel sounds are normal  There is no distension  Palpations: Abdomen is soft  Tenderness: There is no abdominal tenderness  There is no guarding  Musculoskeletal: Normal range of motion  General: No tenderness or deformity  Skin:     General: Skin is warm and dry  Findings: No erythema  Neurological:      Mental Status: He is alert and oriented to person, place, and time  Additional Data:     Labs:    Results from last 7 days   Lab Units 09/10/20  0522   WBC Thousand/uL 11 86*   HEMOGLOBIN g/dL 9 4*   HEMATOCRIT % 28 2*   PLATELETS Thousands/uL 334   NEUTROS PCT % 64   LYMPHS PCT % 19   MONOS PCT % 11   EOS PCT % 4     Results from last 7 days   Lab Units 09/10/20  0522   SODIUM mmol/L 139   POTASSIUM mmol/L 3 5   CHLORIDE mmol/L 103   CO2 mmol/L 26   BUN mg/dL 27*   CREATININE mg/dL 2 14*   ANION GAP mmol/L 10   CALCIUM mg/dL 8 6   GLUCOSE RANDOM mg/dL 103         Results from last 7 days   Lab Units 09/10/20  0526 09/09/20  2111 09/09/20  1515 09/09/20  1330 09/09/20  0607 09/08/20  2108 09/08/20  1532 09/08/20  1304 09/08/20  0647 09/07/20  2042 09/07/20  1629 09/07/20  1211   POC GLUCOSE mg/dl 115 81 98 132 123 103 134 154* 103 109 127 190*                   * I Have Reviewed All Lab Data Listed Above  * Additional Pertinent Lab Tests Reviewed:  All Labs Within Last 24 Hours Reviewed    Imaging:    Imaging Reports Reviewed Today Include: na  Imaging Personally Reviewed by Myself Includes:  na    Recent Cultures (last 7 days):           Last 24 Hours Medication List:   Current Facility-Administered Medications   Medication Dose Route Frequency Provider Last Rate    aluminum-magnesium hydroxide-simethicone  30 mL Oral Q6H PRN Brianna Lindquist DPM      aspirin  81 mg Oral Daily Destin Henderson      atorvastatin  40 mg Oral Daily With Verst[place]      clopidogrel  75 mg Oral Daily Brianna Lindquist [place]      docusate sodium  100 mg Oral BID Hailey Ge MD      heparin (porcine)  5,000 Units Subcutaneous Q8H [facility] NAILA Suarez      insulin glargine  30 Units Subcutaneous HS Hailey Ge MD      insulin lispro  1-5 Units Subcutaneous 4x Daily (AC & HS) Brianna Lindquist DPM      insulin lispro  15 Units Subcutaneous TID With Meals Brianna Lindquist DPM      LORazepam  0 5 mg Oral BID PRN Brianna Lindquist DPM      methimazole  5 mg Oral Daily Brianna Lindquist DPM      ondansetron  4 mg Intravenous Q6H PRN Brianna Lindquist DPM      oxyCODONE  10 mg Oral Q6H PRN Hailey Ge MD      oxyCODONE  5 mg Oral Q6H PRN Hailey Ge MD      pantoprazole  20 mg Oral Early Morning Brianna Lindquist DPM      polyethylene glycol  17 g Oral Daily PRN Brianna Lindquist DPM      sodium chloride  100 mL/hr Intravenous Continuous Brianna Lindquist DPM Stopped (09/10/20 9155)    tamsulosin  0 4 mg Oral Daily With Lucinda Garrett MD      vancomycin  12 5 mg/kg (Adjusted) Intravenous Q12H NAILA Suarez 1,250 mg (09/09/20 2317)    verapamil  240 mg Oral Daily Brianna Lindquist DPM      zolpidem  10 mg Oral HS PRN Brianna Lindquist DPM          Today, Patient Was Seen By: Ronnie Tomas MD    ** Please Note: Dictation voice to text software may have been used in the creation of this document   **

## 2020-09-10 NOTE — PROGRESS NOTES
Vancomycin Assessment    Roma Soto is a 60 y o  male who is currently receiving vancomycin 1250 mg IV q 12 H for skin-soft tissue infection bone/joint infection   Relevant clinical data and objective history reviewed:  Creatinine   Date Value Ref Range Status   09/10/2020 2 14 (H) 0 60 - 1 30 mg/dL Final     Comment:     Standardized to IDMS reference method   09/09/2020 2 07 (H) 0 60 - 1 30 mg/dL Final     Comment:     Standardized to IDMS reference method   09/08/2020 2 17 (H) 0 60 - 1 30 mg/dL Final     Comment:     Standardized to IDMS reference method     Vancomycin Rm   Date Value Ref Range Status   09/08/2020 14 9 ug/mL Final     /88 (BP Location: Left arm)   Pulse 85   Temp 97 5 °F (36 4 °C) (Oral)   Resp 18   Ht 6' 4" (1 93 m)   Wt 115 kg (253 lb 12 oz)   SpO2 95%   BMI 30 89 kg/m²   I/O last 3 completed shifts: In: 4401 7 [P O :900; I V :3501 7]  Out: 2720 [Urine:2720]  Lab Results   Component Value Date/Time    BUN 27 (H) 09/10/2020 05:22 AM    WBC 11 86 (H) 09/10/2020 05:22 AM    HGB 9 4 (L) 09/10/2020 05:22 AM    HCT 28 2 (L) 09/10/2020 05:22 AM    MCV 89 09/10/2020 05:22 AM     09/10/2020 05:22 AM     Temp Readings from Last 3 Encounters:   09/10/20 97 5 °F (36 4 °C) (Oral)   08/20/20 (!) 97 3 °F (36 3 °C) (Tympanic)   08/12/20 98 5 °F (36 9 °C)     Vancomycin Days of Therapy: 14    Assessment/Plan  The patient is currently on vancomycin utilizing scheduled dosing  Baseline risks associated with therapy include: pre-existing renal impairment and concomitant nephrotoxic medications  The patient is receiving 1250 mg IV q 12 H with the most recent vancomycin level being at steady-state and supratherapeutic based on a goal of 15-20 (appropriate for most indications) ; therefore, after clinical evaluation will be changed to 2000 mg IV q 24 H (random level before dose)     Pharmacy will continue to follow closely for s/sx of nephrotoxicity, infusion reactions, and appropriateness of therapy  BMP and CBC will be ordered per protocol  Plan for random level before dose on 9/11/20, call the pharmacy when the level arrives back before administering the dose  Pharmacy will continue to follow the patients culture results and clinical progress daily      Kasie Musa, Pharmacist

## 2020-09-10 NOTE — ASSESSMENT & PLAN NOTE
Likely secondary to sepsis/surgery,+/- urinary retention  Creatinine peaked to 2 32 on 9/5  Baseline appears to be 0 8-0 9  Creatinine currently stable  Patient required 3 straight catheterization,Mae inserted per protocol today  Started Flomax   Continue IV fluids  Avoid nephrotoxin and hypotension  Consulted Nephrology, appreciate input  Repeat BMP in a m

## 2020-09-10 NOTE — ASSESSMENT & PLAN NOTE
Lab Results   Component Value Date    HGBA1C 8 1 (H) 08/28/2020       Recent Labs     09/09/20  1330 09/09/20  1515 09/09/20  2111 09/10/20  0526   POCGLU 132 98 81 115       Blood Sugar Average: Last 72 hrs:  · (P) 921 [id number]     Uncontrolled  Hemoglobin A1c 8 1  Blood sugars improving  Continue Lantus 30 units subcu HS, Humalog 15 units t i d    Continue SSI  Continue to monitor sugar

## 2020-09-11 LAB
ANION GAP SERPL CALCULATED.3IONS-SCNC: 8 MMOL/L (ref 4–13)
BASOPHILS # BLD AUTO: 0.06 THOUSANDS/ΜL (ref 0–0.1)
BASOPHILS NFR BLD AUTO: 1 % (ref 0–1)
BUN SERPL-MCNC: 31 MG/DL (ref 5–25)
CALCIUM SERPL-MCNC: 8.5 MG/DL (ref 8.3–10.1)
CHLORIDE SERPL-SCNC: 100 MMOL/L (ref 100–108)
CO2 SERPL-SCNC: 26 MMOL/L (ref 21–32)
CREAT SERPL-MCNC: 2.24 MG/DL (ref 0.6–1.3)
EOSINOPHIL # BLD AUTO: 0.42 THOUSAND/ΜL (ref 0–0.61)
EOSINOPHIL NFR BLD AUTO: 3 % (ref 0–6)
ERYTHROCYTE [DISTWIDTH] IN BLOOD BY AUTOMATED COUNT: 12.1 % (ref 11.6–15.1)
GFR SERPL CREATININE-BSD FRML MDRD: 31 ML/MIN/1.73SQ M
GLUCOSE SERPL-MCNC: 124 MG/DL (ref 65–140)
GLUCOSE SERPL-MCNC: 125 MG/DL (ref 65–140)
GLUCOSE SERPL-MCNC: 141 MG/DL (ref 65–140)
GLUCOSE SERPL-MCNC: 148 MG/DL (ref 65–140)
GLUCOSE SERPL-MCNC: 190 MG/DL (ref 65–140)
HCT VFR BLD AUTO: 27.9 % (ref 36.5–49.3)
HGB BLD-MCNC: 9.3 G/DL (ref 12–17)
IMM GRANULOCYTES # BLD AUTO: 0.05 THOUSAND/UL (ref 0–0.2)
IMM GRANULOCYTES NFR BLD AUTO: 0 % (ref 0–2)
LYMPHOCYTES # BLD AUTO: 2.42 THOUSANDS/ΜL (ref 0.6–4.47)
LYMPHOCYTES NFR BLD AUTO: 19 % (ref 14–44)
MCH RBC QN AUTO: 29.6 PG (ref 26.8–34.3)
MCHC RBC AUTO-ENTMCNC: 33.3 G/DL (ref 31.4–37.4)
MCV RBC AUTO: 89 FL (ref 82–98)
MONOCYTES # BLD AUTO: 1.27 THOUSAND/ΜL (ref 0.17–1.22)
MONOCYTES NFR BLD AUTO: 10 % (ref 4–12)
NEUTROPHILS # BLD AUTO: 8.46 THOUSANDS/ΜL (ref 1.85–7.62)
NEUTS SEG NFR BLD AUTO: 67 % (ref 43–75)
NRBC BLD AUTO-RTO: 0 /100 WBCS
PLATELET # BLD AUTO: 337 THOUSANDS/UL (ref 149–390)
PMV BLD AUTO: 10.6 FL (ref 8.9–12.7)
POTASSIUM SERPL-SCNC: 3.4 MMOL/L (ref 3.5–5.3)
RBC # BLD AUTO: 3.14 MILLION/UL (ref 3.88–5.62)
SODIUM SERPL-SCNC: 134 MMOL/L (ref 136–145)
VANCOMYCIN SERPL-MCNC: 22.4 UG/ML
WBC # BLD AUTO: 12.68 THOUSAND/UL (ref 4.31–10.16)

## 2020-09-11 PROCEDURE — 99232 SBSQ HOSP IP/OBS MODERATE 35: CPT | Performed by: INTERNAL MEDICINE

## 2020-09-11 PROCEDURE — 80202 ASSAY OF VANCOMYCIN: CPT | Performed by: INTERNAL MEDICINE

## 2020-09-11 PROCEDURE — 80048 BASIC METABOLIC PNL TOTAL CA: CPT | Performed by: INTERNAL MEDICINE

## 2020-09-11 PROCEDURE — 82948 REAGENT STRIP/BLOOD GLUCOSE: CPT

## 2020-09-11 PROCEDURE — 99233 SBSQ HOSP IP/OBS HIGH 50: CPT | Performed by: INTERNAL MEDICINE

## 2020-09-11 PROCEDURE — 85025 COMPLETE CBC W/AUTO DIFF WBC: CPT | Performed by: INTERNAL MEDICINE

## 2020-09-11 RX ADMIN — HEPARIN SODIUM 5000 UNITS: 5000 INJECTION INTRAVENOUS; SUBCUTANEOUS at 22:39

## 2020-09-11 RX ADMIN — INSULIN LISPRO 15 UNITS: 100 INJECTION, SOLUTION INTRAVENOUS; SUBCUTANEOUS at 07:42

## 2020-09-11 RX ADMIN — HEPARIN SODIUM 5000 UNITS: 5000 INJECTION INTRAVENOUS; SUBCUTANEOUS at 06:00

## 2020-09-11 RX ADMIN — ASPIRIN 81 MG 81 MG: 81 TABLET ORAL at 09:12

## 2020-09-11 RX ADMIN — CLOPIDOGREL BISULFATE 75 MG: 75 TABLET ORAL at 09:12

## 2020-09-11 RX ADMIN — INSULIN LISPRO 15 UNITS: 100 INJECTION, SOLUTION INTRAVENOUS; SUBCUTANEOUS at 18:07

## 2020-09-11 RX ADMIN — ATORVASTATIN CALCIUM 40 MG: 40 TABLET, FILM COATED ORAL at 18:06

## 2020-09-11 RX ADMIN — DAPTOMYCIN 600 MG: 500 INJECTION, POWDER, LYOPHILIZED, FOR SOLUTION INTRAVENOUS at 12:15

## 2020-09-11 RX ADMIN — METHIMAZOLE 5 MG: 5 TABLET ORAL at 09:12

## 2020-09-11 RX ADMIN — INSULIN LISPRO 15 UNITS: 100 INJECTION, SOLUTION INTRAVENOUS; SUBCUTANEOUS at 11:55

## 2020-09-11 RX ADMIN — DOCUSATE SODIUM 100 MG: 100 CAPSULE, LIQUID FILLED ORAL at 18:06

## 2020-09-11 RX ADMIN — INSULIN GLARGINE 30 UNITS: 100 INJECTION, SOLUTION SUBCUTANEOUS at 22:38

## 2020-09-11 RX ADMIN — VERAPAMIL HYDROCHLORIDE 240 MG: 120 TABLET, FILM COATED, EXTENDED RELEASE ORAL at 09:12

## 2020-09-11 RX ADMIN — INSULIN LISPRO 1 UNITS: 100 INJECTION, SOLUTION INTRAVENOUS; SUBCUTANEOUS at 22:39

## 2020-09-11 RX ADMIN — TAMSULOSIN HYDROCHLORIDE 0.4 MG: 0.4 CAPSULE ORAL at 18:06

## 2020-09-11 RX ADMIN — INSULIN LISPRO 1 UNITS: 100 INJECTION, SOLUTION INTRAVENOUS; SUBCUTANEOUS at 11:54

## 2020-09-11 RX ADMIN — DOCUSATE SODIUM 100 MG: 100 CAPSULE, LIQUID FILLED ORAL at 09:12

## 2020-09-11 RX ADMIN — PANTOPRAZOLE SODIUM 20 MG: 20 TABLET, DELAYED RELEASE ORAL at 06:00

## 2020-09-11 NOTE — PROGRESS NOTES
NEPHROLOGY PROGRESS NOTE    Patient: Patricia Shearer               Sex: male          DOA: 8/28/2020  3:14 PM   YOB: 1965        Age:  60 y o         LOS:  LOS: 14 days   9/11/2020    REASON FOR THE CONSULTATION:      Acute kidney injury    SUBJECTIVE     Patient is lying in bed and offers no complaints      CURRENT MEDICATIONS       Current Facility-Administered Medications:     aluminum-magnesium hydroxide-simethicone (MYLANTA) 200-200-20 mg/5 mL oral suspension 30 mL, 30 mL, Oral, Q6H PRN, Miranda Betterton, DPM    aspirin chewable tablet 81 mg, 81 mg, Oral, Daily, Harvis Betterton, DPM, 81 mg at 09/11/20 0912    atorvastatin (LIPITOR) tablet 40 mg, 40 mg, Oral, Daily With Berl Grad, DPM, 40 mg at 09/10/20 1826    clopidogrel (PLAVIX) tablet 75 mg, 75 mg, Oral, Daily, Harvis Yolanda, DPM, 75 mg at 09/11/20 0912    DAPTOmycin (CUBICIN) 600 mg in sodium chloride 0 9 % 50 mL IVPB, 6 mg/kg (Adjusted), Intravenous, Q24H, Fiona Cortes DO, Last Rate: 100 mL/hr at 09/11/20 1215, 600 mg at 09/11/20 1215    docusate sodium (COLACE) capsule 100 mg, 100 mg, Oral, BID, Manuel Machado MD, 100 mg at 09/11/20 0912    heparin (porcine) subcutaneous injection 5,000 Units, 5,000 Units, Subcutaneous, Q8H [facility], NAILA Suarez, 5,000 Units at 09/11/20 0600    insulin glargine (LANTUS) subcutaneous injection 30 Units 0 3 mL, 30 Units, Subcutaneous, HS, Manuel Machado MD, 30 Units at 09/10/20 2135    insulin lispro (HumaLOG) 100 units/mL subcutaneous injection 1-5 Units, 1-5 Units, Subcutaneous, 4x Daily (AC & HS), 1 Units at 09/11/20 1154 **AND** Fingerstick Glucose (POCT), , , 4x Daily AC and at bedtime, Miranda Lanesmith, DPM    insulin lispro (HumaLOG) 100 units/mL subcutaneous injection 15 Units, 15 Units, Subcutaneous, TID With Meals, Miranda Guerrero DPM, 15 Units at 09/11/20 1155    LORazepam (ATIVAN) tablet 0 5 mg, 0 5 mg, Oral, BID PRN, Miranda Guerrero DPM    methimazole (TAPAZOLE) tablet 5 mg, 5 mg, Oral, Daily, [facility], DPM, 5 mg at 09/11/20 0912    ondansetron [facility]) injection 4 mg, 4 mg, Intravenous, Q6H PRN, [facility], DPM, 4 mg at 09/03/20 1848    oxyCODONE (ROXICODONE) immediate release tablet 10 mg, 10 mg, Oral, Q6H PRN, Clinton Greenfield MD, 10 mg at 09/03/20 1432    oxyCODONE (ROXICODONE) IR tablet 5 mg, 5 mg, Oral, Q6H PRN, Clinton Greenfield MD    pantoprazole (PROTONIX) EC tablet 20 mg, 20 mg, Oral, Early Morning, [facility], DPM, 20 mg at 09/11/20 0600    polyethylene glycol (MIRALAX) packet 17 g, 17 g, Oral, Daily PRN, [facility], DPM    [facility]) capsule 0 4 mg, 0 4 mg, Oral, Daily With Dinner, Clinton Greenfield MD, 0 4 mg at 09/10/20 1826    verapamil (CALAN-SR) CR tablet 240 mg, 240 mg, Oral, Daily, [facility], DPM, 240 mg at 09/11/20 0912    zolpidem (AMBIEN) tablet 10 mg, 10 mg, Oral, HS PRN, [facility], DPM, 10 mg at 08/31/20 2322    REVIEW OF SYSTEMS     Review of Systems   Constitutional: Negative  HENT: Negative  Eyes: Negative  Respiratory: Negative  Cardiovascular: Negative  Gastrointestinal: Negative  Endocrine: Negative  Genitourinary: Negative  Musculoskeletal: Negative  Right lower extremity with dressing intact   Skin: Negative  Allergic/Immunologic: Negative  Neurological: Negative  Hematological: Negative  All other systems reviewed and are negative  OBJECTIVE     Current Weight: Weight - Scale: 115 kg (253 lb 12 oz)  Vitals:    09/11/20 0823   BP: 165/87   Pulse: 78   Resp: 17   Temp: 98 1 °F (36 7 °C)   SpO2: 94%     Body mass index is 30 89 kg/m²  Intake/Output Summary (Last 24 hours) at 9/11/2020 1312  Last data filed at 9/11/2020 0948  Gross per 24 hour   Intake 620 ml   Output 3325 ml   Net -2705 ml       PHYSICAL EXAMINATION     Physical Exam  HENT:      Head: Normocephalic and atraumatic  Eyes:      Pupils: Pupils are equal, round, and reactive to light     Neck: Musculoskeletal: Neck supple  Vascular: No JVD  Cardiovascular:      Rate and Rhythm: Normal rate and regular rhythm  Heart sounds: Normal heart sounds  No murmur  No friction rub  Pulmonary:      Effort: Pulmonary effort is normal       Breath sounds: Normal breath sounds  Abdominal:      General: Bowel sounds are normal  There is no distension  Palpations: Abdomen is soft  Tenderness: There is no abdominal tenderness  There is no rebound  Musculoskeletal:         General: Swelling present  No tenderness  Right lower leg: Edema present  Skin:     General: Skin is dry  Findings: No rash  Neurological:      Mental Status: He is alert and oriented to person, place, and time  LAB RESULTS     Results from last 7 days   Lab Units 09/11/20  0448 09/10/20  0522 09/09/20  0435 09/09/20  0431 09/08/20  3535 09/07/20  0447 09/06/20  0657 09/05/20  0611   WBC Thousand/uL 12 68* 11 86* 11 64*  --  11 44*  --   --  15 73*   HEMOGLOBIN g/dL 9 3* 9 4* 9 5*  --  10 1*  --   --  10 8*   HEMATOCRIT % 27 9* 28 2* 28 4*  --  29 8*  --   --  32 0*   PLATELETS Thousands/uL 337 334 334  --  322  --   --  341   POTASSIUM mmol/L 3 4* 3 5  --  3 6 3 5 3 1* 3 5 3 8   CHLORIDE mmol/L 100 103  --  102 104 106 101 100   CO2 mmol/L 26 26  --  25 22 22 24 24   BUN mg/dL 31* 27*  --  30* 32* 31* 34* 34*   CREATININE mg/dL 2 24* 2 14*  --  2 07* 2 17* 2 03* 2 32* 2 28*   EGFR ml/min/1 73sq m 31 33  --  35 33 35 30 31   CALCIUM mg/dL 8 5 8 6  --  8 3 8 8 7 9* 9 0 8 9   MAGNESIUM mg/dL  --  1 8  --  1 3*  --   --   --   --            RADIOLOGY RESULTS      No results found for this or any previous visit  Results for orders placed during the hospital encounter of 11/15/18   XR chest 2 views    Narrative CHEST     INDICATION:   n/v, dizziness  COMPARISON:  None    EXAM PERFORMED/VIEWS:  XR CHEST PA & LATERAL      FINDINGS:    Cardiac silhouette is enlarged  Aortic calcification is present    Mildly ectatic ascending aorta  No airspace consolidation, pneumothorax, pulmonary edema, or pleural effusion  Osseous structures appear within normal limits for patient age  Impression No radiographic evidence of acute intrathoracic process  Workstation performed: [id number]         ASSESSMENT/PLAN     57-year-old male with past medical history of uncontrolled diabetes mellitus, hypertension, obesity, recent history of gangrenous toe and osteomyelitis status post right toe amputation who presents with nonhealing ulcer on the right foot and wound dehiscence  1  Acute kidney injury:  Developed during hospitalization  -presented with a serum creatinine of 0 94   -noted serum creatinine of 2 28 on September 5th during this hospitalization  -etiology could be multiple including vancomycin induced nephrotoxicity plus hemodynamic fluctuations secondary to surgery on September 3rd while undergoing trans metatarsal amputation plus septic ATN secondary to MRSA bacteremia   -creatinine peaked to 2 2 and had not improved over the past 1 week  -Vancomycin level noted to be 46 and elevated on September 5th as well presumably causing nephrotoxicity  -current level is 22 and elevated   -patient antibiotic is now switched to daptomycin which is acceptable   -plan follow-up with Nephrology as outpatient to ensure renal recovery  2  Right foot cellulitis:  Suspect secondary to MRSA  Patient received IV daptomycin through September 27     3  Hypertension:  Blood pressure ranging between 636-065 systolic   -currently on Verapamil  - ARB/HCTZ combination was d c  -will continue to monitor  4  Insulin-dependent diabetes mellitus:  Remains on Lantus    5  Thyroid cancer:  Diagnosed with thyroid cancer in July  On methimazole            Marina Deshpande MD  Nephrology  9/11/2020

## 2020-09-11 NOTE — PROGRESS NOTES
Progress Note - Janene Lirao 1965, 60 y o  male MRN: [id number]    Unit/Bed#: -01 Encounter: [id number]    Primary Care Provider: Fred Boo MD   Date and time admitted to hospital: 8/28/2020  3:14 PM        Urinary retention  Assessment & Plan  Required 3 straight straight catheterization,yielded 500 - 700ml urine  Mae inserted today per protocol  Started Flomax  Patient denies constipation  Consult Urology    Acute kidney injury (MARKELL) with acute tubular necrosis (ATN) ([facility] [address] )  Assessment & Plan  Likely secondary to sepsis/surgery,+/- urinary retention  Creatinine peaked to 2 32 on 9/5  Baseline appears to be 0 8-0 9  Creatinine currently stable  Patient required 3 straight catheterization,Mae inserted per protocol today  Started Flomax   Continue IV fluids  Avoid nephrotoxin and hypotension  Consulted Nephrology, appreciate input  Repeat BMP in a m  MRSA bacteremia  Assessment & Plan  2/2 culture from 08/28/2020 growing MRSA  Likely source foot wound  Negative 2D echo  Repeat blood cultures remain negative  Continue IV vancomycin total of 4 weeks through 9/27  Weekly CBC with diff, creatinine, vancomycin level  Outpatient ID follow-up after hospital discharge  Vascular surgery, Podiatry following  ID recommendations appreciated  Given elevated creatinine still will discuss with ID about alternatives to vancomycin    Amputation of right great toe [facility])  Assessment & Plan  Now presented again with poor wound healing, wound dehiscence, possible cellulitis, osteomyelitis  Plan is as stated above under 1  And 2  PAD (peripheral artery disease) [facility])  Assessment & Plan  Patient history of recent right popliteal PTA/ stent 08/06/2020 and right hallux amputation  Now noted with osteomyelitis as evident on right foot MRI report  Cleared by vascular surgery for further debridement and/or amputation  Continue aspirin, statin, Plavix    Follow-up with vascular surgery recommendation as well as podiatry following  Hyperthyroidism  Assessment & Plan  · Diagnosed with thyroid cancer in July  · TSH 0 080  · Free T4 1 39  · Continue home dose of methimazole 5 mg daily  · Recommended close outpatient follow-up  Type 2 diabetes mellitus with diabetic polyneuropathy, with long-term current use of insulin [facility])  Assessment & Plan  Lab Results   Component Value Date    HGBA1C 8 1 (H) 08/28/2020       Recent Labs     09/10/20  1129 09/10/20  1549 09/10/20  2006 09/11/20  0605   POCGLU 114 91 221* 125       Blood Sugar Average: Last 72 hrs:  · (P) 738 [id number]     Uncontrolled  Hemoglobin A1c 8 1  Blood sugars improving  Continue Lantus 30 units subcu HS, Humalog 15 units t i d  Continue SSI  Continue to monitor sugar        Hyperlipidemia  Assessment & Plan  · Continue statin    Gastroesophageal reflux disease without esophagitis  Assessment & Plan  · Continue PPI    Hypertension, essential  Assessment & Plan  · Controlled  · Continue verapamil  Avalide held secondary to acute kidney injury  · Continue to monitor blood pressure closely  · BP stable      * Osteomyelitis (HCC)  Assessment & Plan  Recent history of right hallux amputation  Presented with wound dehiscence, wound cellulitis, possible osteomyelitis  2/2 blood culture from 08/28/2020 growing MRSA  Right foot MRI report reviewed and noted for osteomyelitis  Podiatry recommendations appreciated  Status post right-sided TMA  Continue IV vancomycin for total of 4 weeks through 9/27  Due to patient's worsening creatinine order for central line by IR was placed insert of PICC line in an attempt to preserve the arm in case patient's creatinine deteriorates and were to need dialysis  Pending today    Currently afebrile, hemodynamically stable  Encourage for strict nonweightbearing to right lower extremity at this time  Continue dressing changes per podiatrist recommendation              VTE Pharmacologic Prophylaxis: Pharmacologic: Heparin  Mechanical VTE Prophylaxis in Place: Yes    Patient Centered Rounds: I have performed bedside rounds with nursing staff today  Discussions with Specialists or Other Care Team Provider: cm, nursing    Education and Discussions with Family / Patient: pt    Time Spent for Care: 30 minutes  More than 50% of total time spent on counseling and coordination of care as described above  Current Length of Stay: 14 day(s)    Current Patient Status: Inpatient   Certification Statement: The patient will continue to require additional inpatient hospital stay due to Elevated kidney dysfunction is awaiting placement to rehab    Discharge Plan: pt    Code Status: Level 1 - Full Code      Subjective:   Patient seen and examined  No acute overnight events  Currently denies any chest pain, shortness of breath no cough, abdominal pain, nausea, vomiting    Objective:     Vitals:   Temp (24hrs), Av 3 °F (36 8 °C), Min:98 1 °F (36 7 °C), Max:98 5 °F (36 9 °C)    Temp:  [98 1 °F (36 7 °C)-98 5 °F (36 9 °C)] 98 1 °F (36 7 °C)  HR:  [78-87] 78  Resp:  [17-18] 17  BP: (141-165)/(81-96) 165/87  SpO2:  [94 %-96 %] 94 %  Body mass index is 30 89 kg/m²  Input and Output Summary (last 24 hours): Intake/Output Summary (Last 24 hours) at 2020 1037  Last data filed at 2020 4442  Gross per 24 hour   Intake 740 ml   Output 2525 ml   Net -1785 ml       Physical Exam:     Physical Exam  Constitutional:       General: He is not in acute distress  Appearance: He is well-developed  He is not diaphoretic  HENT:      Head: Normocephalic and atraumatic  Nose: Nose normal       Mouth/Throat:      Pharynx: No oropharyngeal exudate  Eyes:      General: No scleral icterus  Conjunctiva/sclera: Conjunctivae normal    Neck:      Musculoskeletal: Normal range of motion and neck supple  Cardiovascular:      Rate and Rhythm: Normal rate and regular rhythm        Heart sounds: Normal heart sounds  No murmur  No friction rub  No gallop  Pulmonary:      Effort: Pulmonary effort is normal  No respiratory distress  Breath sounds: Normal breath sounds  No wheezing or rales  Chest:      Chest wall: No tenderness  Abdominal:      General: Bowel sounds are normal  There is no distension  Palpations: Abdomen is soft  Tenderness: There is no abdominal tenderness  There is no guarding  Musculoskeletal: Normal range of motion  General: No tenderness or deformity  Skin:     General: Skin is warm and dry  Findings: No erythema  Neurological:      Mental Status: He is alert and oriented to person, place, and time  Additional Data:     Labs:    Results from last 7 days   Lab Units 09/11/20  0448   WBC Thousand/uL 12 68*   HEMOGLOBIN g/dL 9 3*   HEMATOCRIT % 27 9*   PLATELETS Thousands/uL 337   NEUTROS PCT % 67   LYMPHS PCT % 19   MONOS PCT % 10   EOS PCT % 3     Results from last 7 days   Lab Units 09/11/20  0448   SODIUM mmol/L 134*   POTASSIUM mmol/L 3 4*   CHLORIDE mmol/L 100   CO2 mmol/L 26   BUN mg/dL 31*   CREATININE mg/dL 2 24*   ANION GAP mmol/L 8   CALCIUM mg/dL 8 5   GLUCOSE RANDOM mg/dL 124         Results from last 7 days   Lab Units 09/11/20  0605 09/10/20  2006 09/10/20  1549 09/10/20  1129 09/10/20  0526 09/09/20  2111 09/09/20  1515 09/09/20  1330 09/09/20  0607 09/08/20  2108 09/08/20  1532 09/08/20  1304   POC GLUCOSE mg/dl 125 221* 91 114 115 81 98 132 123 103 134 154*                   * I Have Reviewed All Lab Data Listed Above  * Additional Pertinent Lab Tests Reviewed:  All Labs Within Last 24 Hours Reviewed    Imaging:    Imaging Reports Reviewed Today Include: na  Imaging Personally Reviewed by Myself Includes:  na    Recent Cultures (last 7 days):           Last 24 Hours Medication List:   Current Facility-Administered Medications   Medication Dose Route Frequency Provider Last Rate    aluminum-magnesium hydroxide-simethicone  30 mL Oral Q6H PRN Jamari Motto, DPM      aspirin  81 mg Oral Daily Jamari Motto, [place]      atorvastatin  40 mg Oral Daily With VerDestin palomo      clopidogrel  75 mg Oral Daily Jamari Motto, [place]      docusate sodium  100 mg Oral BID Booker Grace MD      heparin (porcine)  5,000 Units Subcutaneous [facility] NAILA Suarez      insulin glargine  30 Units Subcutaneous HS Booker Grace MD      insulin lispro  1-5 Units Subcutaneous 4x Daily (AC & HS) Jamari Motto, DPM      insulin lispro  15 Units Subcutaneous TID With Meals Jamari Motto, DPM      LORazepam  0 5 mg Oral BID PRN Jamari Motto, DPM      methimazole  5 mg Oral Daily Jamari Motto, DPM      ondansetron  4 mg Intravenous Q6H PRN Jamari Motto, DPM      oxyCODONE  10 mg Oral Q6H PRN Booker Grace MD      oxyCODONE  5 mg Oral Q6H PRN Booker Grace MD      pantoprazole  20 mg Oral Early Morning Jamari Motto, DPM      polyethylene glycol  17 g Oral Daily PRN Jamari Motto, DPM      tamsulosin  0 4 mg Oral Daily With Justina Rivers MD      vancomycin  750 mg Intravenous Q12H Tho Eller MD      verapamil  240 mg Oral Daily Jamari Motto, DPM      zolpidem  10 mg Oral HS PRN Jamari Motto, DPM          Today, Patient Was Seen By: Teressa Seaman MD    ** Please Note: Dictation voice to text software may have been used in the creation of this document   **

## 2020-09-11 NOTE — PHYSICAL THERAPY NOTE
Physical Therapy Cancellation Note    Chart review performed  At this time, PT treatment session cancelled secondary to pt refusal  PT explained the benefits of mobility and exercise activities in the prevention of secondary complications of immobility  PT will follow and provide PT interventions as appropriate      Kim An, PT

## 2020-09-11 NOTE — ASSESSMENT & PLAN NOTE
2/2 culture from 08/28/2020 growing MRSA  Likely source foot wound  Negative 2D echo  Repeat blood cultures remain negative  Continue IV vancomycin total of 4 weeks through 9/27  Weekly CBC with diff, creatinine, vancomycin level  Outpatient ID follow-up after hospital discharge  Vascular surgery, Podiatry following    ID recommendations appreciated  Given elevated creatinine still will discuss with ID about alternatives to vancomycin

## 2020-09-11 NOTE — PLAN OF CARE
Problem: Potential for Falls  Goal: Patient will remain free of falls  Description: INTERVENTIONS:  - Assess patient frequently for physical needs  -  Identify cognitive and physical deficits and behaviors that affect risk of falls    -  [place] fall precautions as indicated by assessment   - Educate patient/family on patient safety including physical limitations  - Instruct patient to call for assistance with activity based on assessment  - Modify environment to reduce risk of injury  - Consider OT/PT consult to assist with strengthening/mobility  Outcome: Progressing     Problem: PAIN - ADULT  Goal: Verbalizes/displays adequate comfort level or baseline comfort level  Description: Interventions:  - Encourage patient to monitor pain and request assistance  - Assess pain using appropriate pain scale  - Administer analgesics based on type and severity of pain and evaluate response  - Implement non-pharmacological measures as appropriate and evaluate response  - Consider cultural and social influences on pain and pain management  - Notify physician/advanced practitioner if interventions unsuccessful or patient reports new pain  Outcome: Progressing     Problem: INFECTION - ADULT  Goal: Absence or prevention of progression during hospitalization  Description: INTERVENTIONS:  - Assess and monitor for signs and symptoms of infection  - Monitor lab/diagnostic results  - Monitor all insertion sites, i e  indwelling lines, tubes, and drains  - Monitor endotracheal if appropriate and nasal secretions for changes in amount and color  - [place] appropriate cooling/warming therapies per order  - Administer medications as ordered  - Instruct and encourage patient and family to use good hand hygiene technique  - Identify and instruct in appropriate isolation precautions for identified infection/condition  Outcome: Progressing  Goal: Absence of fever/infection during neutropenic period  Description: INTERVENTIONS:  - Monitor WBC    Outcome: Progressing     Problem: SAFETY ADULT  Goal: Patient will remain free of falls  Description: INTERVENTIONS:  - Assess patient frequently for physical needs  -  Identify cognitive and physical deficits and behaviors that affect risk of falls    -  [place] fall precautions as indicated by assessment   - Educate patient/family on patient safety including physical limitations  - Instruct patient to call for assistance with activity based on assessment  - Modify environment to reduce risk of injury  - Consider OT/PT consult to assist with strengthening/mobility  Outcome: Progressing  Goal: Maintain or return to baseline ADL function  Description: INTERVENTIONS:  -  Assess patient's ability to carry out ADLs; assess patient's baseline for ADL function and identify physical deficits which impact ability to perform ADLs (bathing, care of mouth/teeth, toileting, grooming, dressing, etc )  - Assess/evaluate cause of self-care deficits   - Assess range of motion  - Assess patient's mobility; develop plan if impaired  - Assess patient's need for assistive devices and provide as appropriate  - Encourage maximum independence but intervene and supervise when necessary  - Involve family in performance of ADLs  - Assess for home care needs following discharge   - Consider OT consult to assist with ADL evaluation and planning for discharge  - Provide patient education as appropriate  Outcome: Progressing  Goal: Maintain or return mobility status to optimal level  Description: INTERVENTIONS:  - Assess patient's baseline mobility status (ambulation, transfers, stairs, etc )    - Identify cognitive and physical deficits and behaviors that affect mobility  - Identify mobility aids required to assist with transfers and/or ambulation (gait belt, sit-to-stand, lift, walker, cane, etc )  - [place] fall precautions as indicated by assessment  - Record patient progress and toleration of activity level on Mobility SBAR; progress patient to next Phase/Stage  - Instruct patient to call for assistance with activity based on assessment  - Consider rehabilitation consult to assist with strengthening/weightbearing, etc   Outcome: Progressing     Problem: DISCHARGE PLANNING  Goal: Discharge to home or other facility with appropriate resources  Description: INTERVENTIONS:  - Identify barriers to discharge w/patient and caregiver  - Arrange for needed discharge resources and transportation as appropriate  - Identify discharge learning needs (meds, wound care, etc )  - Arrange for interpretive services to assist at discharge as needed  - Refer to Case Management Department for coordinating discharge planning if the patient needs post-hospital services based on physician/advanced practitioner order or complex needs related to functional status, cognitive ability, or social support system  Outcome: Progressing     Problem: Knowledge Deficit  Goal: Patient/family/caregiver demonstrates understanding of disease process, treatment plan, medications, and discharge instructions  Description: Complete learning assessment and assess knowledge base  Interventions:  - Provide teaching at level of understanding  - Provide teaching via preferred learning methods  Outcome: Progressing     Problem: Nutrition/Hydration-ADULT  Goal: Nutrient/Hydration intake appropriate for improving, restoring or maintaining nutritional needs  Description: Monitor and assess patient's nutrition/hydration status for malnutrition  Collaborate with interdisciplinary team and initiate plan and interventions as ordered  Monitor patient's weight and dietary intake as ordered or per policy  Utilize nutrition screening tool and intervene as necessary  Determine patient's food preferences and provide high-protein, high-caloric foods as appropriate       INTERVENTIONS:  - Monitor oral intake, urinary output, labs, and treatment plans  - Assess nutrition and hydration status and recommend course of action  - Evaluate amount of meals eaten  - Assist patient with eating if necessary   - Allow adequate time for meals  - Recommend/ encourage appropriate diets, oral nutritional supplements, and vitamin/mineral supplements  - Order, calculate, and assess calorie counts as needed  - Recommend, monitor, and adjust tube feedings and TPN/PPN based on assessed needs  - Assess need for intravenous fluids  - Provide specific nutrition/hydration education as appropriate  - Include patient/family/caregiver in decisions related to nutrition  Outcome: Progressing     Problem: Prexisting or High Potential for Compromised Skin Integrity  Goal: Skin integrity is maintained or improved  Description: INTERVENTIONS:  - Identify patients at risk for skin breakdown  - Assess and monitor skin integrity  - Assess and monitor nutrition and hydration status  - Monitor labs   - Assess for incontinence   - Turn and reposition patient  - Assist with mobility/ambulation  - Relieve pressure over bony prominences  - Avoid friction and shearing  - Provide appropriate hygiene as needed including keeping skin clean and dry  - Evaluate need for skin moisturizer/barrier cream  - Collaborate with interdisciplinary team   - Patient/family teaching  - Consider wound care consult   Outcome: Progressing

## 2020-09-11 NOTE — ASSESSMENT & PLAN NOTE
Recent history of right hallux amputation  Presented with wound dehiscence, wound cellulitis, possible osteomyelitis  2/2 blood culture from 08/28/2020 growing MRSA  Right foot MRI report reviewed and noted for osteomyelitis  Podiatry recommendations appreciated  Status post right-sided TMA  Continue IV vancomycin for total of 4 weeks through 9/27  Due to patient's worsening creatinine order for central line by IR was placed insert of PICC line in an attempt to preserve the arm in case patient's creatinine deteriorates and were to need dialysis  Pending today    Currently afebrile, hemodynamically stable  Encourage for strict nonweightbearing to right lower extremity at this time  Continue dressing changes per podiatrist recommendation

## 2020-09-11 NOTE — CASE MANAGEMENT
Jamarcus received a call from [address] [place] [address] [phone]      CM returned her phone call and left a VM about the pt rehab request for [facility]    VM indicates that she will be out of the office until Monday

## 2020-09-11 NOTE — CASE MANAGEMENT
Jamarcus met with the pt to discuss dcp and obtain his choices  He states that he does not like any of the choices  The pt states that he would like to go to [facility] [phone]    CM called the number provided and the VM was for a Julienne Sandoval,   However, the pt indicated that he had spoken with Neftaly Martinez CM called the general number [phone] and was connected with Neftaly Martinez  She stated that she did not speak with the pt and they do not have any beds available until Monday  She states that they are not able to view information in [employer]  CM was provided with a fax number [phone] and she states that she will review the pt records and get back to the CM with a response on Monday  She also states that she is not sure that the [employer] is an insurance they accept  CM informed the pt and his wife

## 2020-09-11 NOTE — ASSESSMENT & PLAN NOTE
Lab Results   Component Value Date    HGBA1C 8 1 (H) 08/28/2020       Recent Labs     09/10/20  1129 09/10/20  1549 09/10/20  2006 09/11/20  0605   POCGLU 114 91 221* 125       Blood Sugar Average: Last 72 hrs:  · (P) 949 [id number]     Uncontrolled  Hemoglobin A1c 8 1  Blood sugars improving  Continue Lantus 30 units subcu HS, Humalog 15 units t i d    Continue SSI  Continue to monitor sugar

## 2020-09-11 NOTE — PROGRESS NOTES
Vancomycin Assessment    Jose L Gould is a 60 y o  male who is currently receiving vancomycin 2000mg iv q 24 hours (not given) for MRSA confirmed, skin-soft tissue infection, other bone/joint   Relevant clinical data and objective history reviewed:  Creatinine   Date Value Ref Range Status   09/11/2020 2 24 (H) 0 60 - 1 30 mg/dL Final     Comment:     Standardized to IDMS reference method   09/10/2020 2 14 (H) 0 60 - 1 30 mg/dL Final     Comment:     Standardized to IDMS reference method   09/09/2020 2 07 (H) 0 60 - 1 30 mg/dL Final     Comment:     Standardized to IDMS reference method     Vancomycin Rm   Date Value Ref Range Status   09/11/2020 22 4 ug/mL Final     /81 (BP Location: Right arm)   Pulse 82   Temp 98 2 °F (36 8 °C) (Oral)   Resp 18   Ht 6' 4" (1 93 m)   Wt 115 kg (253 lb 12 oz)   SpO2 96%   BMI 30 89 kg/m²   I/O last 3 completed shifts: In: 5118 3 [P O :660; I V :4458 3]  Out: 2420 [Urine:2420]  Lab Results   Component Value Date/Time    BUN 31 (H) 09/11/2020 04:48 AM    WBC 12 68 (H) 09/11/2020 04:48 AM    HGB 9 3 (L) 09/11/2020 04:48 AM    HCT 27 9 (L) 09/11/2020 04:48 AM    MCV 89 09/11/2020 04:48 AM     09/11/2020 04:48 AM     Temp Readings from Last 3 Encounters:   09/10/20 98 2 °F (36 8 °C) (Oral)   08/20/20 (!) 97 3 °F (36 3 °C) (Tympanic)   08/12/20 98 5 °F (36 9 °C)     Vancomycin Days of Therapy: 15    Assessment/Plan  The patient is currently on vancomycin utilizing scheduled dosing  Baseline risks associated with therapy include: pre-existing renal impairment, concomitant nephrotoxic medications, and dehydration  The patient is receiving 2000mg iv q 24 hours (not given) with the most recent vancomycin level being at steady-state and supratherapeutic (22 4)based on a goal of 15-20 (appropriate for most indications) ; therefore, after clinical evaluation will be changed to 750 mg iv q 12 hours     Pharmacy will continue to follow closely for s/sx of nephrotoxicity, infusion reactions, and appropriateness of therapy  BMP and CBC will be ordered per protocol  Plan for random level as patient approaches steady state, 5:00 on 9/12/020  Pharmacy will continue to follow the patients culture results and clinical progress daily      Lianet Fernandez, Pharmacist

## 2020-09-11 NOTE — PROGRESS NOTES
Progress Note - Infectious Disease   Zakiya Short 60 y o  male MRN: [id number]  Unit/Bed#: -01 Encounter: [id number]      A/P:  1  MRSA bacteremia   Secondary to #2/3   Negative 2D echo   No intravascular prosthetic devices   Repeat blood culture are negative  Vancomycin level remains supratherapeutic      -discontinue vancomycin  -start IV daptomycin 600 mg Q 24 hours based on renal function  -check baseline CPK  -plan for 4 week course of IV antibiotics from negative blood culture, through 9/27   -check weekly CBC with diff, creatinine, CPK  -outpatient ID follow-up after hospital discharge  -discontinue central line after completion of IV antibiotic     2  Right foot cellulitis   Suspect secondary to MRSA   Wound culture also shows Morganella, Enterobacter, Enterococcus, which is not unexpected finding in the setting of chronic ulceration   Would continue targeted therapy for MRSA infection   Now status post TMA     -antibiotic plan as above  -serial foot exams     3  Right foot nonhealing ulceration   With residual 1st and 2nd toe osteomyelitis   Podiatry input noted  Now s/p TMA on 09/03 with presumed surgical cure of osteomyelitis      -antibiotic plan as above  -daily local wound care and dressing changes  -outpatient podiatry follow-up     4  Status post 1st digit amputation on 08/11/2020   Complicated by above      5  Acute kidney injury   Nephrology input noted with lower suspicion for vancomycin toxicity   Consider secondary to urinary retention   Now status post Mae catheter placement   No acute findings on renal ultrasound  Creatinine remains elevated at 2 2      -change antibiotic, as above   -management of Mae catheter per Urology  -nephrology follow-up ongoing  -monitor BMP closely     6  PAD   Status post recent revascularization   Vascular surgery input noted      7  Uncontrolled diabetes mellitus   Risk factor for severe infection      8  Leukocytosis   Likely reactive in the immediate postoperative setting   Remains afebrile   Blood cultures showed clearance of bacteremia   WBC count remains stable      -antibiotic plan as above  -monitor CBC  -monitor temperatures     Vancomycin  POD8     I discussed above plan with patient, and with Dr Werner Sloan from Internal Medicine Service  If remains inpatient, will plan to see patient again on   Please call us with any new questions in the interim  Subjective:  Doing well  Patient is concerned about his renal function  Mae in place  Denies fevers, chills, or sweats  Denies nausea, vomiting, or diarrhea  Objective:  Vitals:  Temp:  [98 1 °F (36 7 °C)-98 5 °F (36 9 °C)] 98 1 °F (36 7 °C)  HR:  [78-87] 78  Resp:  [17-18] 17  BP: (141-165)/(81-96) 165/87  SpO2:  [94 %-96 %] 94 %  Temp (24hrs), Av 3 °F (36 8 °C), Min:98 1 °F (36 7 °C), Max:98 5 °F (36 9 °C)  Current: Temperature: 98 1 °F (36 7 °C)    Physical Exam:   General:  No acute distress  Psychiatric:  Awake and alert  Pulmonary:  Normal respiratory excursion without accessory muscle use  Abdomen:  Soft, nontender  Mae in place with clear yellow urine  Extremities:  No edema  Skin:  No rashes  Foot dressing intact with no ascending erythema    Lab Results:  I have personally reviewed pertinent labs  Results from last 7 days   Lab Units 09/11/20  0448 09/10/20  0522 09/09/20  0431   POTASSIUM mmol/L 3 4* 3 5 3 6   CHLORIDE mmol/L 100 103 102   CO2 mmol/L 26 26 25   BUN mg/dL 31* 27* 30*   CREATININE mg/dL 2 24* 2 14* 2 07*   EGFR ml/min/1 73sq m 31 33 35   CALCIUM mg/dL 8 5 8 6 8 3     Results from last 7 days   Lab Units 09/11/20  0448 09/10/20  0522 20  0435   WBC Thousand/uL 12 68* 11 86* 11 64*   HEMOGLOBIN g/dL 9 3* 9 4* 9 5*   PLATELETS Thousands/uL 337 334 334           Imaging Studies:   I have personally reviewed pertinent imaging study reports and images in PACS  EKG, Pathology, and Other Studies:   I have personally reviewed pertinent reports

## 2020-09-11 NOTE — PROGRESS NOTES
Progress Note - Zakiya Short 60 y o  male MRN: [id number]    Unit/Bed#: -18 Encounter: [id number]      Assessment:  1) POD #8 right foot TMA  Secondary to nonhealing ulcer and osteomyelitis and uncontrolled diabetes   2) PAD     Plan:  1) Dressings are changed with maxsorb  and DSD  - serous drainage is noted  - slough tissue and dehiscence medially is noted  2) Dressing changes for nursing orders are placed  3) Continue IV antibiotics as per Medicine and ID  4) Strict nonweightbearing to the right foot  5) Serial foot exams  6) With patient's uncontrolled diabetes, PAD, and infection status he remains at high risk for loss of limb, this is discussed with patient that this procedure will be the final foot surgery possible and if it does not heal he will need a leg amputation, patient voiced understanding   7) Patient has poor healing of the TMA, he will likely need HBOT and serial debridements of the foot in order to salvage along with the long term IV antibiotic therapy  8) The likelyhood of this TMA being successful is low secondary to his uncontrolled diabetes, renal disease, infectious state, and PAD  This is discussed with patient, hevoiced understanding  9) Patient would like to go to [facility] for rehab, he can follow with Podiatry/General Surgery/[facility] there     Subjective:   Patient is seen bedside resting comfortably  NAD  Objective:     Vitals: Blood pressure 165/87, pulse 78, temperature 98 1 °F (36 7 °C), temperature source Oral, resp  rate 17, height 6' 4" (1 93 m), weight 115 kg (253 lb 12 oz), SpO2 94 %  ,Body mass index is 30 89 kg/m²        Intake/Output Summary (Last 24 hours) at 9/11/2020 1228  Last data filed at 9/11/2020 0948  Gross per 24 hour   Intake 620 ml   Output 3325 ml   Net -2705 ml       Physical Exam: sutures are intact, maceration to incision site, serous drainage is noted, skin is coapted, the medial aspect had some dehiscense and slough tissue noted measures 6 cm x 0 4 cm x 0 4 cm, no purulence, no fluctuence, no erythema, the skin around the incision site remains pink and viable, no signs of necrosis at this time    Invasive Devices     Peripherally Inserted Central Catheter Line            PICC Line 09/08/20 Right Other (Comment) 2 days          Drain            Urethral Catheter 18 Fr  2 days

## 2020-09-12 LAB
ANION GAP SERPL CALCULATED.3IONS-SCNC: 11 MMOL/L (ref 4–13)
BASOPHILS # BLD AUTO: 0.09 THOUSANDS/ΜL (ref 0–0.1)
BASOPHILS NFR BLD AUTO: 1 % (ref 0–1)
BUN SERPL-MCNC: 30 MG/DL (ref 5–25)
CALCIUM SERPL-MCNC: 9 MG/DL (ref 8.3–10.1)
CHLORIDE SERPL-SCNC: 101 MMOL/L (ref 100–108)
CK SERPL-CCNC: 30 U/L (ref 39–308)
CO2 SERPL-SCNC: 25 MMOL/L (ref 21–32)
CREAT SERPL-MCNC: 2.14 MG/DL (ref 0.6–1.3)
EOSINOPHIL # BLD AUTO: 0.41 THOUSAND/ΜL (ref 0–0.61)
EOSINOPHIL NFR BLD AUTO: 4 % (ref 0–6)
ERYTHROCYTE [DISTWIDTH] IN BLOOD BY AUTOMATED COUNT: 12.1 % (ref 11.6–15.1)
GFR SERPL CREATININE-BSD FRML MDRD: 33 ML/MIN/1.73SQ M
GLUCOSE SERPL-MCNC: 130 MG/DL (ref 65–140)
GLUCOSE SERPL-MCNC: 144 MG/DL (ref 65–140)
GLUCOSE SERPL-MCNC: 159 MG/DL (ref 65–140)
GLUCOSE SERPL-MCNC: 185 MG/DL (ref 65–140)
GLUCOSE SERPL-MCNC: 218 MG/DL (ref 65–140)
HCT VFR BLD AUTO: 28.9 % (ref 36.5–49.3)
HGB BLD-MCNC: 9.6 G/DL (ref 12–17)
IMM GRANULOCYTES # BLD AUTO: 0.05 THOUSAND/UL (ref 0–0.2)
IMM GRANULOCYTES NFR BLD AUTO: 0 % (ref 0–2)
LYMPHOCYTES # BLD AUTO: 2.12 THOUSANDS/ΜL (ref 0.6–4.47)
LYMPHOCYTES NFR BLD AUTO: 18 % (ref 14–44)
MCH RBC QN AUTO: 29.4 PG (ref 26.8–34.3)
MCHC RBC AUTO-ENTMCNC: 33.2 G/DL (ref 31.4–37.4)
MCV RBC AUTO: 88 FL (ref 82–98)
MONOCYTES # BLD AUTO: 1.07 THOUSAND/ΜL (ref 0.17–1.22)
MONOCYTES NFR BLD AUTO: 9 % (ref 4–12)
NEUTROPHILS # BLD AUTO: 7.9 THOUSANDS/ΜL (ref 1.85–7.62)
NEUTS SEG NFR BLD AUTO: 68 % (ref 43–75)
NRBC BLD AUTO-RTO: 0 /100 WBCS
PLATELET # BLD AUTO: 361 THOUSANDS/UL (ref 149–390)
PMV BLD AUTO: 10.5 FL (ref 8.9–12.7)
POTASSIUM SERPL-SCNC: 3.5 MMOL/L (ref 3.5–5.3)
RBC # BLD AUTO: 3.27 MILLION/UL (ref 3.88–5.62)
SODIUM SERPL-SCNC: 137 MMOL/L (ref 136–145)
WBC # BLD AUTO: 11.64 THOUSAND/UL (ref 4.31–10.16)

## 2020-09-12 PROCEDURE — 99232 SBSQ HOSP IP/OBS MODERATE 35: CPT | Performed by: INTERNAL MEDICINE

## 2020-09-12 PROCEDURE — 82550 ASSAY OF CK (CPK): CPT | Performed by: INTERNAL MEDICINE

## 2020-09-12 PROCEDURE — 99233 SBSQ HOSP IP/OBS HIGH 50: CPT | Performed by: INTERNAL MEDICINE

## 2020-09-12 PROCEDURE — 80048 BASIC METABOLIC PNL TOTAL CA: CPT | Performed by: INTERNAL MEDICINE

## 2020-09-12 PROCEDURE — 82948 REAGENT STRIP/BLOOD GLUCOSE: CPT

## 2020-09-12 PROCEDURE — 85025 COMPLETE CBC W/AUTO DIFF WBC: CPT | Performed by: INTERNAL MEDICINE

## 2020-09-12 RX ADMIN — INSULIN LISPRO 15 UNITS: 100 INJECTION, SOLUTION INTRAVENOUS; SUBCUTANEOUS at 18:14

## 2020-09-12 RX ADMIN — INSULIN GLARGINE 30 UNITS: 100 INJECTION, SOLUTION SUBCUTANEOUS at 21:42

## 2020-09-12 RX ADMIN — ASPIRIN 81 MG 81 MG: 81 TABLET ORAL at 09:02

## 2020-09-12 RX ADMIN — HEPARIN SODIUM 5000 UNITS: 5000 INJECTION INTRAVENOUS; SUBCUTANEOUS at 07:39

## 2020-09-12 RX ADMIN — DAPTOMYCIN 600 MG: 500 INJECTION, POWDER, LYOPHILIZED, FOR SOLUTION INTRAVENOUS at 12:31

## 2020-09-12 RX ADMIN — INSULIN LISPRO 1 UNITS: 100 INJECTION, SOLUTION INTRAVENOUS; SUBCUTANEOUS at 21:42

## 2020-09-12 RX ADMIN — PANTOPRAZOLE SODIUM 20 MG: 20 TABLET, DELAYED RELEASE ORAL at 07:38

## 2020-09-12 RX ADMIN — CLOPIDOGREL BISULFATE 75 MG: 75 TABLET ORAL at 09:02

## 2020-09-12 RX ADMIN — HEPARIN SODIUM 5000 UNITS: 5000 INJECTION INTRAVENOUS; SUBCUTANEOUS at 21:42

## 2020-09-12 RX ADMIN — DOCUSATE SODIUM 100 MG: 100 CAPSULE, LIQUID FILLED ORAL at 09:02

## 2020-09-12 RX ADMIN — METHIMAZOLE 5 MG: 5 TABLET ORAL at 09:02

## 2020-09-12 RX ADMIN — ATORVASTATIN CALCIUM 40 MG: 40 TABLET, FILM COATED ORAL at 16:40

## 2020-09-12 RX ADMIN — TAMSULOSIN HYDROCHLORIDE 0.4 MG: 0.4 CAPSULE ORAL at 16:40

## 2020-09-12 RX ADMIN — VERAPAMIL HYDROCHLORIDE 240 MG: 120 TABLET, FILM COATED, EXTENDED RELEASE ORAL at 09:02

## 2020-09-12 RX ADMIN — INSULIN LISPRO 15 UNITS: 100 INJECTION, SOLUTION INTRAVENOUS; SUBCUTANEOUS at 09:15

## 2020-09-12 NOTE — ASSESSMENT & PLAN NOTE
Likely secondary to sepsis/surgery,+/- urinary retention  Creatinine peaked to 2 32 on 9/5  Baseline appears to be 0 8-0 9  Creatinine currently stable  Patient required 3 straight catheterization,Mae inserted per protocol today  Started Flomax   Continue IV fluids  Avoid nephrotoxin and hypotension  Consulted Nephrology, appreciate input    Medically stable for discharge  Creatinine has peaked will need follow-up with Nephrology in the outpatient basis

## 2020-09-12 NOTE — ASSESSMENT & PLAN NOTE
Required 3 straight straight catheterization,yielded 500 - 700ml urine  Mae inserted today per protocol    Started Flomax  Patient denies constipation  Will follow-up outpatient with Urology

## 2020-09-12 NOTE — ASSESSMENT & PLAN NOTE
Recent history of right hallux amputation  Presented with wound dehiscence, wound cellulitis, possible osteomyelitis  2/2 blood culture from 08/28/2020 growing MRSA  Right foot MRI report reviewed and noted for osteomyelitis  Podiatry recommendations appreciated  Status post right-sided TMA  Was switched from vancomycin to  daptomycin given ATN  Due to patient's worsening creatinine order for central line by IR was placed insert of PICC line in an attempt to preserve the arm in case patient's creatinine deteriorates and were to need dialysis  Pending today    Currently afebrile, hemodynamically stable  Encourage for strict nonweightbearing to right lower extremity at this time  Continue dressing changes per podiatrist recommendation

## 2020-09-12 NOTE — PROGRESS NOTES
Progress Note - Livia Wright 1965, 60 y o  male MRN: [id number]    Unit/Bed#: -01 Encounter: [id number]    Primary Care Provider: Elham Pavon MD   Date and time admitted to hospital: 8/28/2020  3:14 PM        Urinary retention  Assessment & Plan  Required 3 straight straight catheterization,yielded 500 - 700ml urine  Mae inserted today per protocol  Started Flomax  Patient denies constipation  Will follow-up outpatient with Urology    Acute kidney injury (MARKELL) with acute tubular necrosis (ATN) ([facility] [address] )  Assessment & Plan  Likely secondary to sepsis/surgery,+/- urinary retention  Creatinine peaked to 2 32 on 9/5  Baseline appears to be 0 8-0 9  Creatinine currently stable  Patient required 3 straight catheterization,Mae inserted per protocol today  Started Flomax   Continue IV fluids  Avoid nephrotoxin and hypotension  Consulted Nephrology, appreciate input  Medically stable for discharge  Creatinine has peaked will need follow-up with Nephrology in the outpatient basis    MRSA bacteremia  Assessment & Plan  2/2 culture from 08/28/2020 growing MRSA  Likely source foot wound  Negative 2D echo  Repeat blood cultures remain negative  Continue IV daptomycin total of 4 weeks through 9/27  Weekly CBC with diff, creatinine, vancomycin level  Outpatient ID follow-up after hospital discharge  Vascular surgery, Podiatry following  ID recommendations appreciated  Given elevated creatinine still will discuss with ID about alternatives to vancomycin    Amputation of right great toe [facility])  Assessment & Plan  Now presented again with poor wound healing, wound dehiscence, possible cellulitis, osteomyelitis  Plan is as stated above under 1  And 2  PAD (peripheral artery disease) [facility])  Assessment & Plan  Patient history of recent right popliteal PTA/ stent 08/06/2020 and right hallux amputation  Now noted with osteomyelitis as evident on right foot MRI report    Cleared by vascular surgery for further debridement and/or amputation  Continue aspirin, statin, Plavix  Follow-up with vascular surgery recommendation as well as podiatry following  Hyperthyroidism  Assessment & Plan  · Diagnosed with thyroid cancer in July  · TSH 0 080  · Free T4 1 39  · Continue home dose of methimazole 5 mg daily  · Recommended close outpatient follow-up  Type 2 diabetes mellitus with diabetic polyneuropathy, with long-term current use of insulin [facility])  Assessment & Plan  Lab Results   Component Value Date    HGBA1C 8 1 (H) 08/28/2020       Recent Labs     09/11/20  0605 09/11/20  1103 09/11/20  1505 09/11/20  2046   POCGLU 125 141* 148* 190*       Blood Sugar Average: Last 72 hrs:  · (P) 653 [id number]     Uncontrolled  Hemoglobin A1c 8 1  Blood sugars improving  Continue Lantus 30 units subcu HS, Humalog 15 units t i d  Continue SSI  Continue to monitor sugar        Hyperlipidemia  Assessment & Plan  · Continue statin    Gastroesophageal reflux disease without esophagitis  Assessment & Plan  · Continue PPI    Hypertension, essential  Assessment & Plan  · Controlled  · Continue verapamil  Avalide held secondary to acute kidney injury  · Continue to monitor blood pressure closely  · BP stable      * Osteomyelitis (HCC)  Assessment & Plan  Recent history of right hallux amputation  Presented with wound dehiscence, wound cellulitis, possible osteomyelitis  2/2 blood culture from 08/28/2020 growing MRSA  Right foot MRI report reviewed and noted for osteomyelitis  Podiatry recommendations appreciated  Status post right-sided TMA  Was switched from vancomycin to  daptomycin given ATN  Due to patient's worsening creatinine order for central line by IR was placed insert of PICC line in an attempt to preserve the arm in case patient's creatinine deteriorates and were to need dialysis  Pending today    Currently afebrile, hemodynamically stable    Encourage for strict nonweightbearing to right lower extremity at this time  Continue dressing changes per podiatrist recommendation     VTE Pharmacologic Prophylaxis:   Pharmacologic: Heparin  Mechanical VTE Prophylaxis in Place: Yes    Patient Centered Rounds: I have performed bedside rounds with nursing staff today  Discussions with Specialists or Other Care Team Provider: cm, nursing    Education and Discussions with Family / Patient: patient    Time Spent for Care: 30 minutes  More than 50% of total time spent on counseling and coordination of care as described above  Current Length of Stay: 15 day(s)    Current Patient Status: Inpatient   Certification Statement: The patient will continue to require additional inpatient hospital stay due to Medically stable for discharge awaiting placement to rehab    Discharge Plan:  Stable for discharge medically awaiting placement     Code Status: Level 1 - Full Code      Subjective:   Patient seen examined  No acute overnight events  Denies any shortness of breath, cough, fevers, chills, abdominal pain, nausea, vomiting    Objective:     Vitals:   Temp (24hrs), Av 4 °F (36 9 °C), Min:98 2 °F (36 8 °C), Max:98 6 °F (37 °C)    Temp:  [98 2 °F (36 8 °C)-98 6 °F (37 °C)] 98 6 °F (37 °C)  HR:  [75-78] 75  Resp:  [18] 18  BP: (116-132)/(78-89) 132/89  SpO2:  [92 %-98 %] 92 %  Body mass index is 30 89 kg/m²  Input and Output Summary (last 24 hours): Intake/Output Summary (Last 24 hours) at 2020 1048  Last data filed at 2020 0901  Gross per 24 hour   Intake 1210 ml   Output 2325 ml   Net -1115 ml       Physical Exam:     Physical Exam  Constitutional:       General: He is not in acute distress  Appearance: He is well-developed  He is not diaphoretic  HENT:      Head: Normocephalic and atraumatic  Nose: Nose normal       Mouth/Throat:      Pharynx: No oropharyngeal exudate  Eyes:      General: No scleral icterus       Conjunctiva/sclera: Conjunctivae normal    Neck:      Musculoskeletal: Normal range of motion and neck supple  Cardiovascular:      Rate and Rhythm: Normal rate and regular rhythm  Heart sounds: Normal heart sounds  No murmur  No friction rub  No gallop  Pulmonary:      Effort: Pulmonary effort is normal  No respiratory distress  Breath sounds: Normal breath sounds  No wheezing or rales  Chest:      Chest wall: No tenderness  Abdominal:      General: Bowel sounds are normal  There is no distension  Palpations: Abdomen is soft  Tenderness: There is no abdominal tenderness  There is no guarding  Musculoskeletal: Normal range of motion  General: No tenderness or deformity  Skin:     General: Skin is warm and dry  Findings: No erythema  Neurological:      Mental Status: He is alert and oriented to person, place, and time  Additional Data:     Labs:    Results from last 7 days   Lab Units 09/12/20  0759   WBC Thousand/uL 11 64*   HEMOGLOBIN g/dL 9 6*   HEMATOCRIT % 28 9*   PLATELETS Thousands/uL 361   NEUTROS PCT % 68   LYMPHS PCT % 18   MONOS PCT % 9   EOS PCT % 4     Results from last 7 days   Lab Units 09/12/20  0759   SODIUM mmol/L 137   POTASSIUM mmol/L 3 5   CHLORIDE mmol/L 101   CO2 mmol/L 25   BUN mg/dL 30*   CREATININE mg/dL 2 14*   ANION GAP mmol/L 11   CALCIUM mg/dL 9 0   GLUCOSE RANDOM mg/dL 144*         Results from last 7 days   Lab Units 09/11/20  2046 09/11/20  1505 09/11/20  1103 09/11/20  0605 09/10/20  2006 09/10/20  1549 09/10/20  1129 09/10/20  0526 09/09/20  2111 09/09/20  1515 09/09/20  1330 09/09/20  0607   POC GLUCOSE mg/dl 190* 148* 141* 125 221* 91 114 115 81 98 132 123                   * I Have Reviewed All Lab Data Listed Above  * Additional Pertinent Lab Tests Reviewed:  All Labs Within Last 24 Hours Reviewed    Imaging:    Imaging Reports Reviewed Today Include: na  Imaging Personally Reviewed by Myself Includes:  na    Recent Cultures (last 7 days):           Last 24 Hours Medication List:   Current Facility-Administered Medications   Medication Dose Route Frequency Provider Last Rate    aluminum-magnesium hydroxide-simethicone  30 mL Oral Q6H PRN Jovan Marti, DPM      aspirin  81 mg Oral Daily [facility]      atorvastatin  40 mg Oral Daily With [facility], DPM      clopidogrel  75 mg Oral Daily [facility]      DAPTOmycin  6 mg/kg (Adjusted) Intravenous Q24H Fiona Aldea,  mg (09/11/20 1215)    docusate sodium  100 mg Oral BID Yris Ellsworth MD      heparin (porcine)  5,000 Units Subcutaneous Q8H [address] CuiNAILA Jean      insulin glargine  30 Units Subcutaneous HS Yris Ellsworth MD      insulin lispro  1-5 Units Subcutaneous 4x Daily (AC & HS) Jovan Amyen, DPM      insulin lispro  15 Units Subcutaneous TID With Meals Jovan Marti, DPM      LORazepam  0 5 mg Oral BID PRN Jovan Marti, DPM      methimazole  5 mg Oral Daily Jovan Hymen, DPM      ondansetron  4 mg Intravenous Q6H PRN Jovan Hymen, DPM      oxyCODONE  10 mg Oral Q6H PRN Yris Ellsworth MD      oxyCODONE  5 mg Oral Q6H PRN Yris Ellsworth MD      pantoprazole  20 mg Oral Early Morning Jovan Hymen, DPM      polyethylene glycol  17 g Oral Daily PRN Jovan Hymen, DPM      tamsulosin  0 4 mg Oral Daily With Tobias Howard MD      verapamil  240 mg Oral Daily Jovan Hymen, DPM      zolpidem  10 mg Oral HS PRN Jovan Marti, DPM          Today, Patient Was Seen By: Aleta Miller MD    ** Please Note: Dictation voice to text software may have been used in the creation of this document   **

## 2020-09-12 NOTE — ASSESSMENT & PLAN NOTE
2/2 culture from 08/28/2020 growing MRSA  Likely source foot wound  Negative 2D echo  Repeat blood cultures remain negative  Continue IV daptomycin total of 4 weeks through 9/27  Weekly CBC with diff, creatinine, vancomycin level  Outpatient ID follow-up after hospital discharge  Vascular surgery, Podiatry following    ID recommendations appreciated  Given elevated creatinine still will discuss with ID about alternatives to vancomycin

## 2020-09-12 NOTE — ASSESSMENT & PLAN NOTE
Lab Results   Component Value Date    HGBA1C 8 1 (H) 08/28/2020       Recent Labs     09/11/20  0605 09/11/20  1103 09/11/20  1505 09/11/20 2046   POCGLU 125 141* 148* 190*       Blood Sugar Average: Last 72 hrs:  · (P) 736 [id number]     Uncontrolled  Hemoglobin A1c 8 1  Blood sugars improving  Continue Lantus 30 units subcu HS, Humalog 15 units t i d    Continue SSI  Continue to monitor sugar

## 2020-09-12 NOTE — PROGRESS NOTES
NEPHROLOGY PROGRESS NOTE    Patient: Jose L Gould               Sex: male          DOA: 8/28/2020  3:14 PM   YOB: 1965        Age:  60 y o         LOS:  LOS: 15 days   9/12/2020    REASON FOR THE CONSULTATION:      Acute kidney injury    SUBJECTIVE     Patient is lying in bed and offers no complaints  Mae catheter remains in place  Antibiotics were switched to daptomycin      CURRENT MEDICATIONS       Current Facility-Administered Medications:     aluminum-magnesium hydroxide-simethicone (MYLANTA) 200-200-20 mg/5 mL oral suspension 30 mL, 30 mL, Oral, Q6H PRN, Brena Massed, DPM    aspirin chewable tablet 81 mg, 81 mg, Oral, Daily, Brena Massed, DPM, 81 mg at 09/12/20 0902    atorvastatin (LIPITOR) tablet 40 mg, 40 mg, Oral, Daily With Niyah Turner, DPM, 40 mg at 09/11/20 1806    clopidogrel (PLAVIX) tablet 75 mg, 75 mg, Oral, Daily, Brena Massed, DPM, 75 mg at 09/12/20 0902    DAPTOmycin (CUBICIN) 600 mg in sodium chloride 0 9 % 50 mL IVPB, 6 mg/kg (Adjusted), Intravenous, Q24H, Fiona Cortes, DO, Last Rate: 100 mL/hr at 09/11/20 1215, 600 mg at 09/11/20 1215    docusate sodium (COLACE) capsule 100 mg, 100 mg, Oral, BID, Lalo Thornton MD, 100 mg at 09/12/20 0902    heparin (porcine) subcutaneous injection 5,000 Units, 5,000 Units, Subcutaneous, Q8H [address], CuiNAILA Jean, 5,000 Units at 09/12/20 0739    insulin glargine (LANTUS) subcutaneous injection 30 Units 0 3 mL, 30 Units, Subcutaneous, HS, Meagan Barraza MD, 30 Units at 09/11/20 2238    insulin lispro (HumaLOG) 100 units/mL subcutaneous injection 1-5 Units, 1-5 Units, Subcutaneous, 4x Daily (AC & HS), 1 Units at 09/11/20 2239 **AND** Fingerstick Glucose (POCT), , , 4x Daily AC and at bedtime, Brena Massed, DPM    insulin lispro (HumaLOG) 100 units/mL subcutaneous injection 15 Units, 15 Units, Subcutaneous, TID With Meals, Malick Marquez DPM, 15 Units at 09/12/20 0915    LORazepam (ATIVAN) tablet 0 5 mg, 0 5 mg, Oral, BID PRN, Eduardo Genevieve, DPM    methimazole (TAPAZOLE) tablet 5 mg, 5 mg, Oral, Daily, Eduardo [place], DPM, 5 mg at 09/12/20 0902    ondansetron [facility]) injection 4 mg, 4 mg, Intravenous, Q6H PRN, Eduardo Genevieve, DPM, 4 mg at 09/03/20 1848    oxyCODONE (ROXICODONE) immediate release tablet 10 mg, 10 mg, Oral, Q6H PRN, Blanco Dudley MD, 10 mg at 09/03/20 1432    oxyCODONE (ROXICODONE) IR tablet 5 mg, 5 mg, Oral, Q6H PRN, Blanco Dudley MD    pantoprazole (PROTONIX) EC tablet 20 mg, 20 mg, Oral, Early Morning, Eduardo [place], DPM, 20 mg at 09/12/20 2452    polyethylene glycol (MIRALAX) packet 17 g, 17 g, Oral, Daily PRN, Eduardo [place], DPM    [facility]) capsule 0 4 mg, 0 4 mg, Oral, Daily With Lily Francisco MD, 0 4 mg at 09/11/20 1806    verapamil (CALAN-SR) CR tablet 240 mg, 240 mg, Oral, Daily, Eduardo Genevieve, DPM, 240 mg at 09/12/20 0902    zolpidem (AMBIEN) tablet 10 mg, 10 mg, Oral, HS PRN, Eduardo [place], DPM, 10 mg at 08/31/20 2322    REVIEW OF SYSTEMS     Review of Systems   Constitutional: Negative  HENT: Negative  Eyes: Negative  Respiratory: Negative  Cardiovascular: Negative  Gastrointestinal: Negative  Endocrine: Negative  Genitourinary: Negative  Musculoskeletal: Negative  Skin: Negative  Allergic/Immunologic: Negative  Neurological: Negative  Hematological: Negative  All other systems reviewed and are negative  OBJECTIVE     Current Weight: Weight - Scale: 115 kg (253 lb 12 oz)  Vitals:    09/12/20 0646   BP: 132/89   Pulse: 75   Resp: 18   Temp: 98 6 °F (37 °C)   SpO2: 92%     Body mass index is 30 89 kg/m²  Intake/Output Summary (Last 24 hours) at 9/12/2020 1014  Last data filed at 9/12/2020 0901  Gross per 24 hour   Intake 1210 ml   Output 2325 ml   Net -1115 ml       PHYSICAL EXAMINATION     Physical Exam  HENT:      Head: Normocephalic and atraumatic     Eyes:      Pupils: Pupils are equal, round, and reactive to light    Neck:      Musculoskeletal: Neck supple  Vascular: No JVD  Cardiovascular:      Rate and Rhythm: Normal rate and regular rhythm  Heart sounds: Normal heart sounds  No murmur  No friction rub  Pulmonary:      Effort: Pulmonary effort is normal       Breath sounds: Normal breath sounds  Abdominal:      General: Bowel sounds are normal  There is no distension  Palpations: Abdomen is soft  Tenderness: There is no abdominal tenderness  There is no rebound  Musculoskeletal:         General: No tenderness  Skin:     General: Skin is dry  Findings: No rash  Neurological:      Mental Status: He is alert and oriented to person, place, and time  LAB RESULTS     Results from last 7 days   Lab Units 09/12/20  0759 09/11/20  0448 09/10/20  0522 09/09/20  0435 09/09/20  0431 09/08/20  0648 09/07/20  0447 09/06/20  0657   WBC Thousand/uL 11 64* 12 68* 11 86* 11 64*  --  11 44*  --   --    HEMOGLOBIN g/dL 9 6* 9 3* 9 4* 9 5*  --  10 1*  --   --    HEMATOCRIT % 28 9* 27 9* 28 2* 28 4*  --  29 8*  --   --    PLATELETS Thousands/uL 361 337 334 334  --  322  --   --    POTASSIUM mmol/L 3 5 3 4* 3 5  --  3 6 3 5 3 1* 3 5   CHLORIDE mmol/L 101 100 103  --  102 104 106 101   CO2 mmol/L 25 26 26  --  25 22 22 24   BUN mg/dL 30* 31* 27*  --  30* 32* 31* 34*   CREATININE mg/dL 2 14* 2 24* 2 14*  --  2 07* 2 17* 2 03* 2 32*   EGFR ml/min/1 73sq m 33 31 33  --  35 33 35 30   CALCIUM mg/dL 9 0 8 5 8 6  --  8 3 8 8 7 9* 9 0   MAGNESIUM mg/dL  --   --  1 8  --  1 3*  --   --   --            RADIOLOGY RESULTS      No results found for this or any previous visit  Results for orders placed during the hospital encounter of 11/15/18   XR chest 2 views    Narrative CHEST     INDICATION:   n/v, dizziness  COMPARISON:  None    EXAM PERFORMED/VIEWS:  XR CHEST PA & LATERAL      FINDINGS:    Cardiac silhouette is enlarged  Aortic calcification is present  Mildly ectatic ascending aorta      No airspace consolidation, pneumothorax, pulmonary edema, or pleural effusion  Osseous structures appear within normal limits for patient age  Impression No radiographic evidence of acute intrathoracic process  Workstation performed: [id number]         ASSESSMENT/PLAN     49-year-old male with past medical history of uncontrolled diabetes mellitus, hypertension, obesity, recent history of gangrenous toe and osteomyelitis status post right toe amputation who presents with nonhealing ulcer on the right foot and wound dehiscence  1  Acute kidney injury:  Developed during hospitalization  -presented with a serum creatinine of 0 94   -noted serum creatinine of 2 28 on September 5th during this hospitalization  -etiology could be multiple including vancomycin induced nephrotoxicity plus hemodynamic fluctuations secondary to surgery on September 3rd while undergoing trans metatarsal amputation plus septic ATN secondary to MRSA bacteremia   -creatinine peaked to 2 2 and had not improved over the past 1 week  -Vancomycin level noted to be 46 and elevated on September 5th as well presumably causing nephrotoxicity  -current level is 22 and elevated   -patient antibiotic switched to daptomycin which is acceptable  -current serum creatinine is 2 1 and stable   -plan follow-up with Nephrology as outpatient to ensure renal recovery  2  Right foot cellulitis:  Suspect secondary to MRSA  Patient received IV daptomycin through September 27     3  Hypertension:  Blood pressure have improved overnight   -currently on Verapamil  - ARB/HCTZ combination was d c  -will continue to monitor  4  Insulin-dependent diabetes mellitus:  Remains on Lantus    5  Thyroid cancer:  Diagnosed with thyroid cancer in July  On methimazole  6  Bladder outlet obstruction:  Noted during this admission either caused by BPH versus overflow incontinence  -status post Mae catheter placement    -recommend following Urology recommendation regarding keeping catheter in 10 days from September 9th and getting a voiding trial when patient is in rehab              Hu Braxton MD  Nephrology  9/12/2020

## 2020-09-13 LAB
ANION GAP SERPL CALCULATED.3IONS-SCNC: 6 MMOL/L (ref 4–13)
BASOPHILS # BLD AUTO: 0.07 THOUSANDS/ΜL (ref 0–0.1)
BASOPHILS NFR BLD AUTO: 1 % (ref 0–1)
BUN SERPL-MCNC: 29 MG/DL (ref 5–25)
CALCIUM SERPL-MCNC: 9.1 MG/DL (ref 8.3–10.1)
CHLORIDE SERPL-SCNC: 99 MMOL/L (ref 100–108)
CO2 SERPL-SCNC: 28 MMOL/L (ref 21–32)
CREAT SERPL-MCNC: 2 MG/DL (ref 0.6–1.3)
EOSINOPHIL # BLD AUTO: 0.45 THOUSAND/ΜL (ref 0–0.61)
EOSINOPHIL NFR BLD AUTO: 4 % (ref 0–6)
ERYTHROCYTE [DISTWIDTH] IN BLOOD BY AUTOMATED COUNT: 12 % (ref 11.6–15.1)
GFR SERPL CREATININE-BSD FRML MDRD: 36 ML/MIN/1.73SQ M
GLUCOSE SERPL-MCNC: 136 MG/DL (ref 65–140)
GLUCOSE SERPL-MCNC: 137 MG/DL (ref 65–140)
GLUCOSE SERPL-MCNC: 159 MG/DL (ref 65–140)
GLUCOSE SERPL-MCNC: 167 MG/DL (ref 65–140)
GLUCOSE SERPL-MCNC: 76 MG/DL (ref 65–140)
HCT VFR BLD AUTO: 29 % (ref 36.5–49.3)
HGB BLD-MCNC: 9.6 G/DL (ref 12–17)
IMM GRANULOCYTES # BLD AUTO: 0.04 THOUSAND/UL (ref 0–0.2)
IMM GRANULOCYTES NFR BLD AUTO: 0 % (ref 0–2)
LYMPHOCYTES # BLD AUTO: 2.48 THOUSANDS/ΜL (ref 0.6–4.47)
LYMPHOCYTES NFR BLD AUTO: 22 % (ref 14–44)
MCH RBC QN AUTO: 29.4 PG (ref 26.8–34.3)
MCHC RBC AUTO-ENTMCNC: 33.1 G/DL (ref 31.4–37.4)
MCV RBC AUTO: 89 FL (ref 82–98)
MONOCYTES # BLD AUTO: 1.15 THOUSAND/ΜL (ref 0.17–1.22)
MONOCYTES NFR BLD AUTO: 10 % (ref 4–12)
NEUTROPHILS # BLD AUTO: 6.93 THOUSANDS/ΜL (ref 1.85–7.62)
NEUTS SEG NFR BLD AUTO: 63 % (ref 43–75)
NRBC BLD AUTO-RTO: 0 /100 WBCS
PLATELET # BLD AUTO: 360 THOUSANDS/UL (ref 149–390)
PMV BLD AUTO: 10.4 FL (ref 8.9–12.7)
POTASSIUM SERPL-SCNC: 3.5 MMOL/L (ref 3.5–5.3)
RBC # BLD AUTO: 3.26 MILLION/UL (ref 3.88–5.62)
SODIUM SERPL-SCNC: 133 MMOL/L (ref 136–145)
WBC # BLD AUTO: 11.12 THOUSAND/UL (ref 4.31–10.16)

## 2020-09-13 PROCEDURE — 80048 BASIC METABOLIC PNL TOTAL CA: CPT | Performed by: INTERNAL MEDICINE

## 2020-09-13 PROCEDURE — 85025 COMPLETE CBC W/AUTO DIFF WBC: CPT | Performed by: INTERNAL MEDICINE

## 2020-09-13 PROCEDURE — 82948 REAGENT STRIP/BLOOD GLUCOSE: CPT

## 2020-09-13 PROCEDURE — 99232 SBSQ HOSP IP/OBS MODERATE 35: CPT | Performed by: INTERNAL MEDICINE

## 2020-09-13 PROCEDURE — 99233 SBSQ HOSP IP/OBS HIGH 50: CPT | Performed by: INTERNAL MEDICINE

## 2020-09-13 RX ORDER — BACITRACIN, NEOMYCIN, POLYMYXIN B 400; 3.5; 5 [USP'U]/G; MG/G; [USP'U]/G
1 OINTMENT TOPICAL 2 TIMES DAILY
Status: DISCONTINUED | OUTPATIENT
Start: 2020-09-13 | End: 2020-09-13

## 2020-09-13 RX ORDER — ACETAMINOPHEN 325 MG/1
650 TABLET ORAL EVERY 6 HOURS PRN
Status: DISCONTINUED | OUTPATIENT
Start: 2020-09-13 | End: 2020-09-21 | Stop reason: HOSPADM

## 2020-09-13 RX ORDER — BACITRACIN, NEOMYCIN, POLYMYXIN B 400; 3.5; 5 [USP'U]/G; MG/G; [USP'U]/G
1 OINTMENT TOPICAL 2 TIMES DAILY
Status: DISCONTINUED | OUTPATIENT
Start: 2020-09-13 | End: 2020-09-21 | Stop reason: HOSPADM

## 2020-09-13 RX ADMIN — ATORVASTATIN CALCIUM 40 MG: 40 TABLET, FILM COATED ORAL at 19:02

## 2020-09-13 RX ADMIN — DOCUSATE SODIUM 100 MG: 100 CAPSULE, LIQUID FILLED ORAL at 19:02

## 2020-09-13 RX ADMIN — INSULIN GLARGINE 30 UNITS: 100 INJECTION, SOLUTION SUBCUTANEOUS at 22:28

## 2020-09-13 RX ADMIN — BACITRACIN, NEOMYCIN, POLYMYXIN B 1 SMALL APPLICATION: 400; 3.5; 5 OINTMENT TOPICAL at 19:03

## 2020-09-13 RX ADMIN — HEPARIN SODIUM 5000 UNITS: 5000 INJECTION INTRAVENOUS; SUBCUTANEOUS at 22:27

## 2020-09-13 RX ADMIN — ACETAMINOPHEN 650 MG: 325 TABLET, FILM COATED ORAL at 22:35

## 2020-09-13 RX ADMIN — HEPARIN SODIUM 5000 UNITS: 5000 INJECTION INTRAVENOUS; SUBCUTANEOUS at 13:05

## 2020-09-13 RX ADMIN — DAPTOMYCIN 600 MG: 500 INJECTION, POWDER, LYOPHILIZED, FOR SOLUTION INTRAVENOUS at 13:04

## 2020-09-13 RX ADMIN — INSULIN LISPRO 15 UNITS: 100 INJECTION, SOLUTION INTRAVENOUS; SUBCUTANEOUS at 08:51

## 2020-09-13 RX ADMIN — INSULIN LISPRO 15 UNITS: 100 INJECTION, SOLUTION INTRAVENOUS; SUBCUTANEOUS at 13:00

## 2020-09-13 RX ADMIN — HEPARIN SODIUM 5000 UNITS: 5000 INJECTION INTRAVENOUS; SUBCUTANEOUS at 06:00

## 2020-09-13 RX ADMIN — METHIMAZOLE 5 MG: 5 TABLET ORAL at 08:53

## 2020-09-13 RX ADMIN — TAMSULOSIN HYDROCHLORIDE 0.4 MG: 0.4 CAPSULE ORAL at 19:02

## 2020-09-13 RX ADMIN — ASPIRIN 81 MG 81 MG: 81 TABLET ORAL at 08:53

## 2020-09-13 RX ADMIN — VERAPAMIL HYDROCHLORIDE 240 MG: 120 TABLET, FILM COATED, EXTENDED RELEASE ORAL at 08:53

## 2020-09-13 RX ADMIN — DOCUSATE SODIUM 100 MG: 100 CAPSULE, LIQUID FILLED ORAL at 08:53

## 2020-09-13 RX ADMIN — CLOPIDOGREL BISULFATE 75 MG: 75 TABLET ORAL at 08:53

## 2020-09-13 RX ADMIN — PANTOPRAZOLE SODIUM 20 MG: 20 TABLET, DELAYED RELEASE ORAL at 06:00

## 2020-09-13 RX ADMIN — BACITRACIN, NEOMYCIN, POLYMYXIN B 1 SMALL APPLICATION: 400; 3.5; 5 OINTMENT TOPICAL at 08:53

## 2020-09-13 NOTE — PROGRESS NOTES
NEPHROLOGY PROGRESS NOTE    Patient: Beverly Machado               Sex: male          DOA: 8/28/2020  3:14 PM   YOB: 1965        Age:  60 y o         LOS:  LOS: 16 days   9/13/2020    REASON FOR THE CONSULTATION:      Acute kidney injury    SUBJECTIVE     Patient offers no major complaints  Adequate urination noted      CURRENT MEDICATIONS       Current Facility-Administered Medications:     aluminum-magnesium hydroxide-simethicone (MYLANTA) 200-200-20 mg/5 mL oral suspension 30 mL, 30 mL, Oral, Q6H PRN, Alex Surya, DPM    aspirin chewable tablet 81 mg, 81 mg, Oral, Daily, Alex Surya, DPM, 81 mg at 09/13/20 0853    atorvastatin (LIPITOR) tablet 40 mg, 40 mg, Oral, Daily With Elizabeth Robin, DPM, 40 mg at 09/12/20 1640    clopidogrel (PLAVIX) tablet 75 mg, 75 mg, Oral, Daily, Alex Surya, DPM, 75 mg at 09/13/20 0853    DAPTOmycin (CUBICIN) 600 mg in sodium chloride 0 9 % 50 mL IVPB, 6 mg/kg (Adjusted), Intravenous, Q24H, Fiona Cortes DO, Last Rate: 100 mL/hr at 09/12/20 1231, 600 mg at 09/12/20 1231    docusate sodium (COLACE) capsule 100 mg, 100 mg, Oral, BID, Vin Prieto MD, 100 mg at 09/13/20 0853    heparin (porcine) subcutaneous injection 5,000 Units, 5,000 Units, Subcutaneous, Q8H [address], NAILA Suarez, 5,000 Units at 09/13/20 0600    insulin glargine (LANTUS) subcutaneous injection 30 Units 0 3 mL, 30 Units, Subcutaneous, HS, Vin Prieto MD, 30 Units at 09/12/20 2142    insulin lispro (HumaLOG) 100 units/mL subcutaneous injection 1-5 Units, 1-5 Units, Subcutaneous, 4x Daily (AC & HS), 1 Units at 09/12/20 2142 **AND** Fingerstick Glucose (POCT), , , 4x Daily AC and at bedtime, Alex Surya, DPM    insulin lispro (HumaLOG) 100 units/mL subcutaneous injection 15 Units, 15 Units, Subcutaneous, TID With Meals, Alex Surya, DPM, 15 Units at 09/13/20 0851    LORazepam (ATIVAN) tablet 0 5 mg, 0 5 mg, Oral, BID PRN, Alex Surya, DPM    methimazole (TAPAZOLE) tablet 5 mg, 5 mg, Oral, Daily, MATT CraftM, 5 mg at 09/13/20 0853    neomycin-bacitracin-polymyxin b (NEOSPORIN) ointment 1 small application, 1 small application, Topical, BID, Evins [place]etta, CRNP, 1 small application at 53/62/91 0853    ondansetron (ZOFRAN) injection 4 mg, 4 mg, Intravenous, Q6H PRN, Betty Mcneill DPM, 4 mg at 09/03/20 1848    oxyCODONE (ROXICODONE) immediate release tablet 10 mg, 10 mg, Oral, Q6H PRN, Dakota Soto MD, 10 mg at 09/03/20 1432    oxyCODONE (ROXICODONE) IR tablet 5 mg, 5 mg, Oral, Q6H PRN, Dakota Soto MD    pantoprazole (PROTONIX) EC tablet 20 mg, 20 mg, Oral, Early Morning, Betty Mcneill DPM, 20 mg at 09/13/20 0600    polyethylene glycol (MIRALAX) packet 17 g, 17 g, Oral, Daily PRN, Betty Mcneill DPM    [facility]) capsule 0 4 mg, 0 4 mg, Oral, Daily With Thelma Arias MD, 0 4 mg at 09/12/20 1640    verapamil (CALAN-SR) CR tablet 240 mg, 240 mg, Oral, Daily, Betty Mcneill DPM, 240 mg at 09/13/20 0853    zolpidem (AMBIEN) tablet 10 mg, 10 mg, Oral, HS PRN, Betty Mcneill DPM, 10 mg at 08/31/20 2322    REVIEW OF SYSTEMS     Review of Systems   Constitutional: Negative  HENT: Negative  Eyes: Negative  Respiratory: Negative  Cardiovascular: Negative  Gastrointestinal: Negative  Endocrine: Negative  Genitourinary: Negative  Musculoskeletal: Negative  Skin: Negative  Allergic/Immunologic: Negative  Neurological: Negative  Hematological: Negative  All other systems reviewed and are negative  OBJECTIVE     Current Weight: Weight - Scale: 115 kg (253 lb 12 oz)  Vitals:    09/13/20 0857   BP: 152/85   Pulse: 80   Resp: 20   Temp: 97 7 °F (36 5 °C)   SpO2: 97%     Body mass index is 30 89 kg/m²      Intake/Output Summary (Last 24 hours) at 9/13/2020 1013  Last data filed at 9/13/2020 0650  Gross per 24 hour   Intake 840 ml   Output 1720 ml   Net -880 ml       PHYSICAL EXAMINATION     Physical Exam  HENT:      Head: Normocephalic and atraumatic  Eyes:      Pupils: Pupils are equal, round, and reactive to light  Neck:      Musculoskeletal: Neck supple  Vascular: No JVD  Cardiovascular:      Rate and Rhythm: Normal rate and regular rhythm  Heart sounds: Normal heart sounds  No murmur  No friction rub  Pulmonary:      Effort: Pulmonary effort is normal       Breath sounds: Normal breath sounds  Abdominal:      General: Bowel sounds are normal  There is no distension  Palpations: Abdomen is soft  Tenderness: There is no abdominal tenderness  There is no rebound  Genitourinary:     Comments: Mae catheter in place  Musculoskeletal:         General: No tenderness  Skin:     General: Skin is dry  Findings: No rash  Neurological:      Mental Status: He is alert and oriented to person, place, and time  LAB RESULTS     Results from last 7 days   Lab Units 09/13/20  0642 09/12/20  0759 09/11/20  0448 09/10/20  0522 09/09/20  0435 09/09/20  0431 09/08/20  0648 09/07/20  0447   WBC Thousand/uL 11 12* 11 64* 12 68* 11 86* 11 64*  --  11 44*  --    HEMOGLOBIN g/dL 9 6* 9 6* 9 3* 9 4* 9 5*  --  10 1*  --    HEMATOCRIT % 29 0* 28 9* 27 9* 28 2* 28 4*  --  29 8*  --    PLATELETS Thousands/uL 360 361 337 334 334  --  322  --    POTASSIUM mmol/L 3 5 3 5 3 4* 3 5  --  3 6 3 5 3 1*   CHLORIDE mmol/L 99* 101 100 103  --  102 104 106   CO2 mmol/L 28 25 26 26  --  25 22 22   BUN mg/dL 29* 30* 31* 27*  --  30* 32* 31*   CREATININE mg/dL 2 00* 2 14* 2 24* 2 14*  --  2 07* 2 17* 2 03*   EGFR ml/min/1 73sq m 36 33 31 33  --  35 33 35   CALCIUM mg/dL 9 1 9 0 8 5 8 6  --  8 3 8 8 7 9*   MAGNESIUM mg/dL  --   --   --  1 8  --  1 3*  --   --            RADIOLOGY RESULTS      No results found for this or any previous visit  Results for orders placed during the hospital encounter of 11/15/18   XR chest 2 views    Narrative CHEST     INDICATION:   n/v, dizziness      COMPARISON: None    EXAM PERFORMED/VIEWS:  XR CHEST PA & LATERAL      FINDINGS:    Cardiac silhouette is enlarged  Aortic calcification is present  Mildly ectatic ascending aorta  No airspace consolidation, pneumothorax, pulmonary edema, or pleural effusion  Osseous structures appear within normal limits for patient age  Impression No radiographic evidence of acute intrathoracic process  Workstation performed: [id number]         ASSESSMENT/PLAN     49-year-old male with past medical history of uncontrolled diabetes mellitus, hypertension, obesity, recent history of gangrenous toe and osteomyelitis status post right toe amputation who presents with nonhealing ulcer on the right foot and wound dehiscence  1  Acute kidney injury:  Developed during hospitalization  -presented with a serum creatinine of 0 94   -noted serum creatinine of 2 28 on September 5th during this hospitalization  -etiology could be multiple including vancomycin induced nephrotoxicity plus hemodynamic fluctuations secondary to surgery on September 3rd while undergoing trans metatarsal amputation plus septic ATN secondary to MRSA bacteremia   -creatinine peaked to 2 2 and had not improved over the past 1 week  -Vancomycin level noted to be 46 and elevated on September 5th as well presumably causing nephrotoxicity  -current level is 22 and elevated   -patient antibiotic switched to daptomycin which is acceptable  -current serum creatinine is 2 0 and stable   -plan follow-up with Nephrology as outpatient to ensure renal recovery  2  Right foot cellulitis:  Suspect secondary to MRSA  Patient to receive IV daptomycin through September 27     3  Hypertension:  Blood pressures fluctuating between 886-616  mmHg systolic   - ARB/HCTZ combination was d c  -will continue to monitor  4  Insulin-dependent diabetes mellitus:  Remains on Lantus    5  Thyroid cancer:  Diagnosed with thyroid cancer in July  On methimazole      6  Bladder outlet obstruction:  Noted during this admission either caused by BPH versus overflow incontinence  -status post Mae catheter placement               Libby Gutierrez MD  Nephrology  9/13/2020

## 2020-09-13 NOTE — ASSESSMENT & PLAN NOTE
Lab Results   Component Value Date    HGBA1C 8 1 (H) 08/28/2020       Recent Labs     09/12/20  1123 09/12/20  1553 09/12/20 2055 09/13/20  0607   POCGLU 218* 159* 185* 136       Blood Sugar Average: Last 72 hrs:  · (P) 151 [id number]     Uncontrolled  Hemoglobin A1c 8 1  Blood sugars improving  Continue Lantus 30 units subcu HS, Humalog 15 units t i d    Continue SSI  Continue to monitor sugar

## 2020-09-14 LAB
ANION GAP SERPL CALCULATED.3IONS-SCNC: 7 MMOL/L (ref 4–13)
BASOPHILS # BLD AUTO: 0.07 THOUSANDS/ΜL (ref 0–0.1)
BASOPHILS NFR BLD AUTO: 1 % (ref 0–1)
BUN SERPL-MCNC: 30 MG/DL (ref 5–25)
CALCIUM SERPL-MCNC: 8.7 MG/DL (ref 8.3–10.1)
CHLORIDE SERPL-SCNC: 102 MMOL/L (ref 100–108)
CO2 SERPL-SCNC: 27 MMOL/L (ref 21–32)
CREAT SERPL-MCNC: 2.07 MG/DL (ref 0.6–1.3)
EOSINOPHIL # BLD AUTO: 0.4 THOUSAND/ΜL (ref 0–0.61)
EOSINOPHIL NFR BLD AUTO: 4 % (ref 0–6)
ERYTHROCYTE [DISTWIDTH] IN BLOOD BY AUTOMATED COUNT: 12.2 % (ref 11.6–15.1)
GFR SERPL CREATININE-BSD FRML MDRD: 35 ML/MIN/1.73SQ M
GLUCOSE SERPL-MCNC: 117 MG/DL (ref 65–140)
GLUCOSE SERPL-MCNC: 124 MG/DL (ref 65–140)
GLUCOSE SERPL-MCNC: 165 MG/DL (ref 65–140)
GLUCOSE SERPL-MCNC: 279 MG/DL (ref 65–140)
GLUCOSE SERPL-MCNC: 290 MG/DL (ref 65–140)
HCT VFR BLD AUTO: 27.7 % (ref 36.5–49.3)
HGB BLD-MCNC: 9.2 G/DL (ref 12–17)
IMM GRANULOCYTES # BLD AUTO: 0.05 THOUSAND/UL (ref 0–0.2)
IMM GRANULOCYTES NFR BLD AUTO: 1 % (ref 0–2)
LYMPHOCYTES # BLD AUTO: 2.49 THOUSANDS/ΜL (ref 0.6–4.47)
LYMPHOCYTES NFR BLD AUTO: 23 % (ref 14–44)
MCH RBC QN AUTO: 29.5 PG (ref 26.8–34.3)
MCHC RBC AUTO-ENTMCNC: 33.2 G/DL (ref 31.4–37.4)
MCV RBC AUTO: 89 FL (ref 82–98)
MONOCYTES # BLD AUTO: 1.25 THOUSAND/ΜL (ref 0.17–1.22)
MONOCYTES NFR BLD AUTO: 12 % (ref 4–12)
NEUTROPHILS # BLD AUTO: 6.36 THOUSANDS/ΜL (ref 1.85–7.62)
NEUTS SEG NFR BLD AUTO: 59 % (ref 43–75)
NRBC BLD AUTO-RTO: 0 /100 WBCS
PLATELET # BLD AUTO: 379 THOUSANDS/UL (ref 149–390)
PMV BLD AUTO: 10.5 FL (ref 8.9–12.7)
POTASSIUM SERPL-SCNC: 3.3 MMOL/L (ref 3.5–5.3)
RBC # BLD AUTO: 3.12 MILLION/UL (ref 3.88–5.62)
SODIUM SERPL-SCNC: 136 MMOL/L (ref 136–145)
WBC # BLD AUTO: 10.62 THOUSAND/UL (ref 4.31–10.16)

## 2020-09-14 PROCEDURE — 99233 SBSQ HOSP IP/OBS HIGH 50: CPT | Performed by: INTERNAL MEDICINE

## 2020-09-14 PROCEDURE — 99232 SBSQ HOSP IP/OBS MODERATE 35: CPT | Performed by: INTERNAL MEDICINE

## 2020-09-14 PROCEDURE — 85025 COMPLETE CBC W/AUTO DIFF WBC: CPT | Performed by: INTERNAL MEDICINE

## 2020-09-14 PROCEDURE — 80048 BASIC METABOLIC PNL TOTAL CA: CPT | Performed by: INTERNAL MEDICINE

## 2020-09-14 PROCEDURE — 82948 REAGENT STRIP/BLOOD GLUCOSE: CPT

## 2020-09-14 RX ADMIN — INSULIN LISPRO 15 UNITS: 100 INJECTION, SOLUTION INTRAVENOUS; SUBCUTANEOUS at 13:55

## 2020-09-14 RX ADMIN — HEPARIN SODIUM 5000 UNITS: 5000 INJECTION INTRAVENOUS; SUBCUTANEOUS at 07:31

## 2020-09-14 RX ADMIN — BACITRACIN, NEOMYCIN, POLYMYXIN B 1 SMALL APPLICATION: 400; 3.5; 5 OINTMENT TOPICAL at 08:31

## 2020-09-14 RX ADMIN — INSULIN LISPRO 15 UNITS: 100 INJECTION, SOLUTION INTRAVENOUS; SUBCUTANEOUS at 08:33

## 2020-09-14 RX ADMIN — DOCUSATE SODIUM 100 MG: 100 CAPSULE, LIQUID FILLED ORAL at 08:30

## 2020-09-14 RX ADMIN — ATORVASTATIN CALCIUM 40 MG: 40 TABLET, FILM COATED ORAL at 17:54

## 2020-09-14 RX ADMIN — INSULIN LISPRO 3 UNITS: 100 INJECTION, SOLUTION INTRAVENOUS; SUBCUTANEOUS at 21:14

## 2020-09-14 RX ADMIN — HEPARIN SODIUM 5000 UNITS: 5000 INJECTION INTRAVENOUS; SUBCUTANEOUS at 13:53

## 2020-09-14 RX ADMIN — INSULIN GLARGINE 30 UNITS: 100 INJECTION, SOLUTION SUBCUTANEOUS at 21:14

## 2020-09-14 RX ADMIN — METHIMAZOLE 5 MG: 5 TABLET ORAL at 08:31

## 2020-09-14 RX ADMIN — DAPTOMYCIN 600 MG: 500 INJECTION, POWDER, LYOPHILIZED, FOR SOLUTION INTRAVENOUS at 15:00

## 2020-09-14 RX ADMIN — PANTOPRAZOLE SODIUM 20 MG: 20 TABLET, DELAYED RELEASE ORAL at 07:31

## 2020-09-14 RX ADMIN — ACETAMINOPHEN 650 MG: 325 TABLET, FILM COATED ORAL at 23:05

## 2020-09-14 RX ADMIN — ASPIRIN 81 MG 81 MG: 81 TABLET ORAL at 08:30

## 2020-09-14 RX ADMIN — INSULIN LISPRO 1 UNITS: 100 INJECTION, SOLUTION INTRAVENOUS; SUBCUTANEOUS at 13:56

## 2020-09-14 RX ADMIN — BACITRACIN, NEOMYCIN, POLYMYXIN B 1 SMALL APPLICATION: 400; 3.5; 5 OINTMENT TOPICAL at 17:54

## 2020-09-14 RX ADMIN — CLOPIDOGREL BISULFATE 75 MG: 75 TABLET ORAL at 08:30

## 2020-09-14 RX ADMIN — TAMSULOSIN HYDROCHLORIDE 0.4 MG: 0.4 CAPSULE ORAL at 17:54

## 2020-09-14 RX ADMIN — VERAPAMIL HYDROCHLORIDE 240 MG: 120 TABLET, FILM COATED, EXTENDED RELEASE ORAL at 08:30

## 2020-09-14 NOTE — ASSESSMENT & PLAN NOTE
Likely secondary to sepsis/surgery,+/- urinary retention  Creatinine peaked to 2 32 on 9/5  Baseline appears to be 0 8-0 9  Creatinine currently stable  Patient required 3 straight catheterization,Mae inserted per protocol today    C/w Flomax   Avoid nephrotoxin and hypotension  Medically stable for discharge  Creatinine has peaked will need follow-up with Nephrology in the outpatient basis

## 2020-09-14 NOTE — PROGRESS NOTES
Pt refused insulin coverage for n1600  Pt stated he would let the nurse know when he wanted it  Education given to pt re: BS of 290  Dose put on hold

## 2020-09-14 NOTE — CASE MANAGEMENT
GAL contacted [facility] to request that the pt be placed on the list  For tomorrow because he will need an auth for placement  [facility] has a full schedule for today and they are not fully staffed for the day      GAL updated Rose Walker about the requested information for the pt to go to [facility]

## 2020-09-14 NOTE — ASSESSMENT & PLAN NOTE
Lab Results   Component Value Date    HGBA1C 8 1 (H) 08/28/2020       Recent Labs     09/13/20  1107 09/13/20  1545 09/13/20  2120 09/14/20  0744   POCGLU 159* 76 167* 124       Blood Sugar Average: Last 72 hrs:  · (P) 856 [id number]     Uncontrolled  Hemoglobin A1c 8 1  Blood sugars improving  Continue Lantus 30 units subcu HS, Humalog 15 units t i d    Continue SSI  Continue to monitor sugar

## 2020-09-14 NOTE — PROGRESS NOTES
Progress Note - rKistie Ortega 1965, 60 y o  male MRN: [id number]    Unit/Bed#: -01 Encounter: [id number]    Primary Care Provider: Manisha Barry MD   Date and time admitted to hospital: 8/28/2020  3:14 PM        Urinary retention  Assessment & Plan  Required 3 straight straight catheterization,yielded 500 - 700ml urine  Mae inserted today per protocol  Started Flomax  Patient denies constipation  Will follow-up outpatient with Urology    Acute kidney injury (MARKELL) with acute tubular necrosis (ATN) ([facility] [address] )  Assessment & Plan  Likely secondary to sepsis/surgery,+/- urinary retention  Creatinine peaked to 2 32 on 9/5  Baseline appears to be 0 8-0 9  Creatinine currently stable  Patient required 3 straight catheterization,Mae inserted per protocol today  C/w Flomax   Avoid nephrotoxin and hypotension  Medically stable for discharge  Creatinine has peaked will need follow-up with Nephrology in the outpatient basis    MRSA bacteremia  Assessment & Plan  2/2 culture from 08/28/2020 growing MRSA  Likely source foot wound  Negative 2D echo  Repeat blood cultures remain negative  Continue IV daptomycin total of 4 weeks through 9/27  Weekly CBC with diff, creatinine, vancomycin level  Outpatient ID follow-up after hospital discharge  Vascular surgery, Podiatry following  ID recommendations appreciated  Given elevated creatinine still will discuss with ID about alternatives to vancomycin    Amputation of right great toe [facility])  Assessment & Plan  Now presented again with poor wound healing, wound dehiscence, possible cellulitis, osteomyelitis  Plan is as stated above under 1  And 2  PAD (peripheral artery disease) [facility])  Assessment & Plan  Patient history of recent right popliteal PTA/ stent 08/06/2020 and right hallux amputation  Now noted with osteomyelitis as evident on right foot MRI report  Cleared by vascular surgery for further debridement and/or amputation    Continue aspirin, statin, Plavix  Follow-up with vascular surgery recommendation as well as podiatry following  Hyperthyroidism  Assessment & Plan  · Diagnosed with thyroid cancer in July  · TSH 0 080  · Free T4 1 39  · Continue home dose of methimazole 5 mg daily  · Recommended close outpatient follow-up  Type 2 diabetes mellitus with diabetic polyneuropathy, with long-term current use of insulin [facility])  Assessment & Plan  Lab Results   Component Value Date    HGBA1C 8 1 (H) 08/28/2020       Recent Labs     09/13/20  1107 09/13/20  1545 09/13/20  2120 09/14/20  0744   POCGLU 159* 76 167* 124       Blood Sugar Average: Last 72 hrs:  · (P) 760 [id number]     Uncontrolled  Hemoglobin A1c 8 1  Blood sugars improving  Continue Lantus 30 units subcu HS, Humalog 15 units t i d  Continue SSI  Continue to monitor sugar        Hyperlipidemia  Assessment & Plan  · Continue statin    Gastroesophageal reflux disease without esophagitis  Assessment & Plan  · Continue PPI    Hypertension, essential  Assessment & Plan  · Controlled  · Continue verapamil  Avalide held secondary to acute kidney injury  · Continue to monitor blood pressure closely  · BP stable      * Osteomyelitis (HCC)  Assessment & Plan  Recent history of right hallux amputation  Presented with wound dehiscence, wound cellulitis, possible osteomyelitis  2/2 blood culture from 08/28/2020 growing MRSA  Right foot MRI report reviewed and noted for osteomyelitis  Podiatry recommendations appreciated  Status post right-sided TMA  Was switched from vancomycin to  daptomycin given ATN  Due to patient's worsening creatinine order for central line by IR was placed insert of PICC line in an attempt to preserve the arm in case patient's creatinine deteriorates and were to need dialysis  Pending today    Currently afebrile, hemodynamically stable  Encourage for strict nonweightbearing to right lower extremity at this time    Continue dressing changes per podiatrist recommendation  VTE Pharmacologic Prophylaxis:   Pharmacologic: Heparin  Mechanical VTE Prophylaxis in Place: Yes    Patient Centered Rounds: I have performed bedside rounds with nursing staff today  Discussions with Specialists or Other Care Team Provider: cm, nursing    Education and Discussions with Family / Patient: pt    Time Spent for Care: 30 minutes  More than 50% of total time spent on counseling and coordination of care as described above  Current Length of Stay: 17 day(s)    Current Patient Status: Inpatient   Certification Statement: The patient will continue to require additional inpatient hospital stay due to Medically stable for discharge awaiting placement to rehab    Discharge Plan:  Awaiting placement to rehab medically stable    Code Status: Level 1 - Full Code      Subjective:   Patient seen examined  No acute overnight events  Denies chest pain, shortness of breath and cough, fevers, chills    Objective:     Vitals:   Temp (24hrs), Av 5 °F (36 9 °C), Min:98 4 °F (36 9 °C), Max:98 6 °F (37 °C)    Temp:  [98 4 °F (36 9 °C)-98 6 °F (37 °C)] 98 4 °F (36 9 °C)  HR:  [80-83] 80  Resp:  [18] 18  BP: (145-169)/(82-92) 169/92  SpO2:  [95 %-97 %] 95 %  Body mass index is 30 89 kg/m²  Input and Output Summary (last 24 hours): Intake/Output Summary (Last 24 hours) at 2020 1006  Last data filed at 2020 0745  Gross per 24 hour   Intake 480 ml   Output 1825 ml   Net -1345 ml       Physical Exam:     Physical Exam  Constitutional:       General: He is not in acute distress  Appearance: He is well-developed  He is not diaphoretic  HENT:      Head: Normocephalic and atraumatic  Nose: Nose normal       Mouth/Throat:      Pharynx: No oropharyngeal exudate  Eyes:      General: No scleral icterus  Conjunctiva/sclera: Conjunctivae normal    Neck:      Musculoskeletal: Normal range of motion and neck supple     Cardiovascular:      Rate and Rhythm: Normal rate and regular rhythm  Heart sounds: Normal heart sounds  No murmur  No friction rub  No gallop  Pulmonary:      Effort: Pulmonary effort is normal  No respiratory distress  Breath sounds: Normal breath sounds  No wheezing or rales  Chest:      Chest wall: No tenderness  Abdominal:      General: Bowel sounds are normal  There is no distension  Palpations: Abdomen is soft  Tenderness: There is no abdominal tenderness  There is no guarding  Musculoskeletal: Normal range of motion  General: No tenderness or deformity  Skin:     General: Skin is warm and dry  Findings: No erythema  Neurological:      Mental Status: He is alert and oriented to person, place, and time  Additional Data:     Labs:    Results from last 7 days   Lab Units 09/14/20  0437   WBC Thousand/uL 10 62*   HEMOGLOBIN g/dL 9 2*   HEMATOCRIT % 27 7*   PLATELETS Thousands/uL 379   NEUTROS PCT % 59   LYMPHS PCT % 23   MONOS PCT % 12   EOS PCT % 4     Results from last 7 days   Lab Units 09/14/20  0437   SODIUM mmol/L 136   POTASSIUM mmol/L 3 3*   CHLORIDE mmol/L 102   CO2 mmol/L 27   BUN mg/dL 30*   CREATININE mg/dL 2 07*   ANION GAP mmol/L 7   CALCIUM mg/dL 8 7   GLUCOSE RANDOM mg/dL 117         Results from last 7 days   Lab Units 09/14/20  0744 09/13/20  2120 09/13/20  1545 09/13/20  1107 09/13/20  0607 09/12/20  2055 09/12/20  1553 09/12/20  1123 09/12/20  0646 09/11/20  2046 09/11/20  1505 09/11/20  1103   POC GLUCOSE mg/dl 124 167* 76 159* 136 185* 159* 218* 130 190* 148* 141*                   * I Have Reviewed All Lab Data Listed Above  * Additional Pertinent Lab Tests Reviewed:  All Labs Within Last 24 Hours Reviewed    Imaging:    Imaging Reports Reviewed Today Include: na  Imaging Personally Reviewed by Myself Includes:  na    Recent Cultures (last 7 days):           Last 24 Hours Medication List:   Current Facility-Administered Medications   Medication Dose Route Frequency Provider Last Rate  acetaminophen  650 mg Oral Q6H PRN Vidhi Vasquez PA-C      aluminum-magnesium hydroxide-simethicone  30 mL Oral Q6H PRN Lyn Lewis DPM      aspirin  81 mg Oral Daily Destin Ramirez      atorvastatin  40 mg Oral Daily With HERMAN Coffman      clopidogrel  75 mg Oral Daily Destin Ramirez      DAPTOmycin  6 mg/kg (Adjusted) Intravenous Q24H Fiona Aldea,  mg (09/13/20 1304)    docusate sodium  100 mg Oral BID Jonas Menezes MD      heparin (porcine)  5,000 Units Subcutaneous Q8H [address] ReniNAILA Jean      insulin glargine  30 Units Subcutaneous HS Jonas Menezes MD      insulin lispro  1-5 Units Subcutaneous 4x Daily (AC & HS) Lyn Lewis DPM      insulin lispro  15 Units Subcutaneous TID With Meals Lyn Lewis DPM      LORazepam  0 5 mg Oral BID PRN Lyn Lewis DPM      methimazole  5 mg Oral Daily Destin Ramirez      neomycin-bacitracin-polymyxin b  1 small application Topical BID NAILA Griffith      ondansetron  4 mg Intravenous Q6H PRN Lyn Lewis DPM      oxyCODONE  10 mg Oral Q6H PRN Jonas Menezes MD      oxyCODONE  5 mg Oral Q6H PRN Jonas Menezes MD      pantoprazole  20 mg Oral Early Morning Lyn Lewis DPM      polyethylene glycol  17 g Oral Daily PRN Lyn Lewis DPM      tamsulosin  0 4 mg Oral Daily With Gladys Drake MD      verapamil  240 mg Oral Daily Lyn Lewis DPM      zolpidem  10 mg Oral HS PRN Lyn Lewis DPM          Today, Patient Was Seen By: Jass Emmanuel MD    ** Please Note: Dictation voice to text software may have been used in the creation of this document   **

## 2020-09-14 NOTE — CASE MANAGEMENT
Jamarcus spoke with  Judie Castaneda from [facility] and she states that the pt would seem to be more appropriate for STR  She states that there will need to be a lot more information for them to accept the pt  She states that the pt would need to have specifications about when he would need outside treatments such as seeing podiatry, hyperbariatric treatment, etc   She also expressed that there will be concerns about his dc to a resident that has steps  She states that there needs to be updated PT/OT notes and she also expressed concerns about the distance and ability for him to follow up with services      CM will speak with the pt and also have PT/OT see the pt today

## 2020-09-14 NOTE — PROGRESS NOTES
Progress Note - Infectious Disease   Will Lisy 60 y o  male MRN: [id number]  Unit/Bed#: -01 Encounter: [id number]         A/P:  1  MRSA bacteremia   Secondary to #2/3   Negative 2D echo   No intravascular prosthetic devices   Repeat blood culture are negative  Antibiotic was changed to daptomycin due to elevated creatinine, supratherapeutic levels      -continue IV daptomycin 600 mg Q 24 hours based on renal function  -plan for 4 week course of IV antibiotics from negative blood culture, through 9/27   -check weekly CBC with diff, creatinine, CPK  -outpatient ID follow-up after hospital discharge  -discontinue central line after completion of IV antibiotic     2  Right foot cellulitis   Suspect secondary to MRSA   Wound culture also shows Morganella, Enterobacter, Enterococcus, which is not unexpected finding in the setting of chronic ulceration   Would continue targeted therapy for MRSA infection   Now status post TMA     -antibiotic plan as above  -serial foot exams     3  Right foot nonhealing ulceration   With residual 1st and 2nd toe osteomyelitis   Podiatry input noted  Now s/p TMA on 09/03 with presumed surgical cure of osteomyelitis      -antibiotic plan as above  -daily local wound care and dressing changes  -outpatient podiatry follow-up     4  Status post 1st digit amputation on 08/11/2020   Complicated by above      5  Acute kidney injury   Nephrology input noted with lower suspicion for vancomycin toxicity   Consider secondary to urinary retention   Now status post Mae catheter placement   No acute findings on renal ultrasound  Creatinine is slowly trending down, stable at 2 today  Antibiotic was changed to daptomycin, as above      -antibiotic as above  -management of Mae catheter per Urology  -nephrology follow-up ongoing  -monitor BMP closely     6  PAD   Status post recent revascularization   Vascular surgery input noted      7   Uncontrolled diabetes mellitus   Risk factor for severe infection      8  Leukocytosis  Likely reactive in the immediate postoperative setting   Remains afebrile   Blood cultures showed clearance of bacteremia   WBC count remains stable      -antibiotic plan as above  -monitor CBC  -monitor temperatures     Daptomycin 4  POD8         Subjective:  Feeling well with no new complaints  Denies fevers, chills, or sweats  Denies nausea, vomiting, or diarrhea  Objective:  Vitals:  Temp:  [98 4 °F (36 9 °C)-98 6 °F (37 °C)] 98 4 °F (36 9 °C)  HR:  [80-83] 80  Resp:  [18] 18  BP: (145-169)/(82-92) 169/92  SpO2:  [95 %-97 %] 95 %  Temp (24hrs), Av 5 °F (36 9 °C), Min:98 4 °F (36 9 °C), Max:98 6 °F (37 °C)  Current: Temperature: 98 4 °F (36 9 °C)    Physical Exam:   General:  No acute distress  Psychiatric:  Awake and alert  Pulmonary:  Normal respiratory excursion without accessory muscle use  Abdomen:  Soft, nontender  Extremities:  No edema  Skin:  No rashes    Lab Results:  I have personally reviewed pertinent labs  Results from last 7 days   Lab Units 20  0437 20  0642 20  0759   POTASSIUM mmol/L 3 3* 3 5 3 5   CHLORIDE mmol/L 102 99* 101   CO2 mmol/L 27 28 25   BUN mg/dL 30* 29* 30*   CREATININE mg/dL 2 07* 2 00* 2 14*   EGFR ml/min/1 73sq m 35 36 33   CALCIUM mg/dL 8 7 9 1 9 0     Results from last 7 days   Lab Units 20  0437 20  0642 20  0759   WBC Thousand/uL 10 62* 11 12* 11 64*   HEMOGLOBIN g/dL 9 2* 9 6* 9 6*   PLATELETS Thousands/uL 379 360 361           Imaging Studies:   I have personally reviewed pertinent imaging study reports and images in PACS  EKG, Pathology, and Other Studies:   I have personally reviewed pertinent reports

## 2020-09-14 NOTE — PROGRESS NOTES
NEPHROLOGY PROGRESS NOTE    Patient: Garland Moffett               Sex: male          DOA: 8/28/2020  3:14 PM   YOB: 1965        Age:  60 y o         LOS:  LOS: 17 days       HPI     Patient with acute kidney injury and cellulitis of foot    SUBJECTIVE     Patient is feeling well    Noticing well    No chest pain or palpitation or shortness of Breath    CURRENT MEDICATIONS       Current Facility-Administered Medications:     acetaminophen (TYLENOL) tablet 650 mg, 650 mg, Oral, Q6H PRN, Collins Domínguez PA-C, 650 mg at 09/13/20 2235    aluminum-magnesium hydroxide-simethicone (MYLANTA) 200-200-20 mg/5 mL oral suspension 30 mL, 30 mL, Oral, Q6H PRN, Darroll Cole, DPM    aspirin chewable tablet 81 mg, 81 mg, Oral, Daily, Darroll Cole, DPM, 81 mg at 09/14/20 0830    atorvastatin (LIPITOR) tablet 40 mg, 40 mg, Oral, Daily With Cecelia Alvarado, DPM, 40 mg at 09/13/20 1902    clopidogrel (PLAVIX) tablet 75 mg, 75 mg, Oral, Daily, Darroll Cole, DPM, 75 mg at 09/14/20 0830    DAPTOmycin (CUBICIN) 600 mg in sodium chloride 0 9 % 50 mL IVPB, 6 mg/kg (Adjusted), Intravenous, Q24H, Fiona Cortes DO, Last Rate: 100 mL/hr at 09/13/20 1304, 600 mg at 09/13/20 1304    docusate sodium (COLACE) capsule 100 mg, 100 mg, Oral, BID, Lalo Thornton MD, 100 mg at 09/14/20 0830    heparin (porcine) subcutaneous injection 5,000 Units, 5,000 Units, Subcutaneous, Q8H [address], CuiNAILA Jean, 5,000 Units at 09/14/20 0731    insulin glargine (LANTUS) subcutaneous injection 30 Units 0 3 mL, 30 Units, Subcutaneous, HS, Magaly Westbrook MD, 30 Units at 09/13/20 2228    insulin lispro (HumaLOG) 100 units/mL subcutaneous injection 1-5 Units, 1-5 Units, Subcutaneous, 4x Daily (AC & HS), 1 Units at 09/12/20 2142 **AND** Fingerstick Glucose (POCT), , , 4x Daily AC and at bedtime, Kassy Galvna DPM    insulin lispro (HumaLOG) 100 units/mL subcutaneous injection 15 Units, 15 Units, Subcutaneous, TID With Meals, Curlene Spruce Hope, DPM, 15 Units at 09/14/20 0833    LORazepam (ATIVAN) tablet 0 5 mg, 0 5 mg, Oral, BID PRN, Napolean New, DPM    methimazole (TAPAZOLE) tablet 5 mg, 5 mg, Oral, Daily, Napolean New, DPM, 5 mg at 09/14/20 0831    neomycin-bacitracin-polymyxin b (NEOSPORIN) ointment 1 small application, 1 small application, Topical, BID, NAILA Green, 1 small application at 99/62/19 0831    ondansetron (ZOFRAN) injection 4 mg, 4 mg, Intravenous, Q6H PRN, Napolean New, DPM, 4 mg at 09/03/20 1848    oxyCODONE (ROXICODONE) immediate release tablet 10 mg, 10 mg, Oral, Q6H PRN, Randall Barone MD, 10 mg at 09/03/20 1432    oxyCODONE (ROXICODONE) IR tablet 5 mg, 5 mg, Oral, Q6H PRN, Randall Barone MD    pantoprazole (PROTONIX) EC tablet 20 mg, 20 mg, Oral, Early Morning, Napolean New, DPM, 20 mg at 09/14/20 0731    polyethylene glycol (MIRALAX) packet 17 g, 17 g, Oral, Daily PRN, Napolean New, DPM    [facility]) capsule 0 4 mg, 0 4 mg, Oral, Daily With Kashif Cheng MD, 0 4 mg at 09/13/20 1902    verapamil (CALAN-SR) CR tablet 240 mg, 240 mg, Oral, Daily, Napolean New, DPM, 240 mg at 09/14/20 0830    zolpidem (AMBIEN) tablet 10 mg, 10 mg, Oral, HS PRN, Napolean New, DPM, 10 mg at 08/31/20 2322    OBJECTIVE     Current Weight: Weight - Scale: 115 kg (253 lb 12 oz)  Vitals:    09/14/20 0745   BP: 169/92   Pulse: 80   Resp: 18   Temp: 98 4 °F (36 9 °C)   SpO2: 95%       Intake/Output Summary (Last 24 hours) at 9/14/2020 1134  Last data filed at 9/14/2020 0745  Gross per 24 hour   Intake 480 ml   Output 1825 ml   Net -1345 ml       PHYSICAL EXAMINATION     Physical Exam  Constitutional:       General: He is not in acute distress  Appearance: He is well-developed  HENT:      Head: Normocephalic  Eyes:      General: No scleral icterus  Conjunctiva/sclera: Conjunctivae normal    Neck:      Musculoskeletal: Neck supple  Vascular: No JVD     Cardiovascular:      Rate and Rhythm: Normal rate  Heart sounds: Normal heart sounds  Pulmonary:      Effort: Pulmonary effort is normal       Breath sounds: No wheezing  Abdominal:      Palpations: Abdomen is soft  Tenderness: There is no abdominal tenderness  Musculoskeletal: Normal range of motion  Skin:     General: Skin is warm  Findings: No rash  Neurological:      Mental Status: He is alert and oriented to person, place, and time  Psychiatric:         Behavior: Behavior normal           LAB RESULTS     Results from last 7 days   Lab Units 09/14/20  0437 09/13/20  4578 09/12/20  0759 09/11/20  0448 09/10/20  0522 09/09/20  0435 09/09/20  0431 09/08/20  0648   WBC Thousand/uL 10 62* 11 12* 11 64* 12 68* 11 86* 11 64*  --  11 44*   HEMOGLOBIN g/dL 9 2* 9 6* 9 6* 9 3* 9 4* 9 5*  --  10 1*   HEMATOCRIT % 27 7* 29 0* 28 9* 27 9* 28 2* 28 4*  --  29 8*   PLATELETS Thousands/uL 379 360 361 337 334 334  --  322   POTASSIUM mmol/L 3 3* 3 5 3 5 3 4* 3 5  --  3 6 3 5   CHLORIDE mmol/L 102 99* 101 100 103  --  102 104   CO2 mmol/L 27 28 25 26 26  --  25 22   BUN mg/dL 30* 29* 30* 31* 27*  --  30* 32*   CREATININE mg/dL 2 07* 2 00* 2 14* 2 24* 2 14*  --  2 07* 2 17*   EGFR ml/min/1 73sq m 35 36 33 31 33  --  35 33   CALCIUM mg/dL 8 7 9 1 9 0 8 5 8 6  --  8 3 8 8   MAGNESIUM mg/dL  --   --   --   --  1 8  --  1 3*  --        RADIOLOGY RESULTS      No results found for this or any previous visit  Results for orders placed during the hospital encounter of 11/15/18   XR chest 2 views    Narrative CHEST     INDICATION:   n/v, dizziness  COMPARISON:  None    EXAM PERFORMED/VIEWS:  XR CHEST PA & LATERAL      FINDINGS:    Cardiac silhouette is enlarged  Aortic calcification is present  Mildly ectatic ascending aorta  No airspace consolidation, pneumothorax, pulmonary edema, or pleural effusion  Osseous structures appear within normal limits for patient age        Impression No radiographic evidence of acute intrathoracic process  Workstation performed: [id number]       No results found for this or any previous visit  No results found for this or any previous visit  No results found for this or any previous visit  No results found for this or any previous visit  PLAN / RECOMMENDATIONS      Acute kidney injury :  Possible secondary to Vanco induced ATN  Stable at this point  Will continue to monitor  Patient is non-oliguric    Cellulitis of  foot: On antibiotic    Hypertension:  Blood pressure is fluctuating  We will continue monitoring this point    Jimbo Ulloa MD  Nephrology  9/14/2020        Portions of the record may have been created with voice recognition software  Occasional wrong word or "sound a like" substitutions may have occurred due to the inherent limitations of voice recognition software  Read the chart carefully and recognize, using context, where substitutions have occurred

## 2020-09-15 LAB
ANION GAP SERPL CALCULATED.3IONS-SCNC: 8 MMOL/L (ref 4–13)
BASOPHILS # BLD AUTO: 0.08 THOUSANDS/ΜL (ref 0–0.1)
BASOPHILS NFR BLD AUTO: 1 % (ref 0–1)
BUN SERPL-MCNC: 32 MG/DL (ref 5–25)
CALCIUM SERPL-MCNC: 8.9 MG/DL (ref 8.3–10.1)
CHLORIDE SERPL-SCNC: 101 MMOL/L (ref 100–108)
CO2 SERPL-SCNC: 28 MMOL/L (ref 21–32)
CREAT SERPL-MCNC: 1.99 MG/DL (ref 0.6–1.3)
EOSINOPHIL # BLD AUTO: 0.44 THOUSAND/ΜL (ref 0–0.61)
EOSINOPHIL NFR BLD AUTO: 4 % (ref 0–6)
ERYTHROCYTE [DISTWIDTH] IN BLOOD BY AUTOMATED COUNT: 12.2 % (ref 11.6–15.1)
GFR SERPL CREATININE-BSD FRML MDRD: 36 ML/MIN/1.73SQ M
GLUCOSE SERPL-MCNC: 161 MG/DL (ref 65–140)
GLUCOSE SERPL-MCNC: 173 MG/DL (ref 65–140)
GLUCOSE SERPL-MCNC: 176 MG/DL (ref 65–140)
GLUCOSE SERPL-MCNC: 211 MG/DL (ref 65–140)
GLUCOSE SERPL-MCNC: 264 MG/DL (ref 65–140)
HCT VFR BLD AUTO: 28.9 % (ref 36.5–49.3)
HGB BLD-MCNC: 9.5 G/DL (ref 12–17)
IMM GRANULOCYTES # BLD AUTO: 0.04 THOUSAND/UL (ref 0–0.2)
IMM GRANULOCYTES NFR BLD AUTO: 0 % (ref 0–2)
LYMPHOCYTES # BLD AUTO: 2.41 THOUSANDS/ΜL (ref 0.6–4.47)
LYMPHOCYTES NFR BLD AUTO: 23 % (ref 14–44)
MCH RBC QN AUTO: 29.2 PG (ref 26.8–34.3)
MCHC RBC AUTO-ENTMCNC: 32.9 G/DL (ref 31.4–37.4)
MCV RBC AUTO: 89 FL (ref 82–98)
MONOCYTES # BLD AUTO: 1.15 THOUSAND/ΜL (ref 0.17–1.22)
MONOCYTES NFR BLD AUTO: 11 % (ref 4–12)
NEUTROPHILS # BLD AUTO: 6.34 THOUSANDS/ΜL (ref 1.85–7.62)
NEUTS SEG NFR BLD AUTO: 61 % (ref 43–75)
NRBC BLD AUTO-RTO: 0 /100 WBCS
PLATELET # BLD AUTO: 380 THOUSANDS/UL (ref 149–390)
PMV BLD AUTO: 10.5 FL (ref 8.9–12.7)
POTASSIUM SERPL-SCNC: 3.5 MMOL/L (ref 3.5–5.3)
RBC # BLD AUTO: 3.25 MILLION/UL (ref 3.88–5.62)
SODIUM SERPL-SCNC: 137 MMOL/L (ref 136–145)
WBC # BLD AUTO: 10.46 THOUSAND/UL (ref 4.31–10.16)

## 2020-09-15 PROCEDURE — 97530 THERAPEUTIC ACTIVITIES: CPT

## 2020-09-15 PROCEDURE — 80048 BASIC METABOLIC PNL TOTAL CA: CPT | Performed by: INTERNAL MEDICINE

## 2020-09-15 PROCEDURE — 99232 SBSQ HOSP IP/OBS MODERATE 35: CPT | Performed by: INTERNAL MEDICINE

## 2020-09-15 PROCEDURE — 99233 SBSQ HOSP IP/OBS HIGH 50: CPT | Performed by: INTERNAL MEDICINE

## 2020-09-15 PROCEDURE — 82948 REAGENT STRIP/BLOOD GLUCOSE: CPT

## 2020-09-15 PROCEDURE — 97535 SELF CARE MNGMENT TRAINING: CPT

## 2020-09-15 PROCEDURE — 85025 COMPLETE CBC W/AUTO DIFF WBC: CPT | Performed by: INTERNAL MEDICINE

## 2020-09-15 RX ADMIN — PANTOPRAZOLE SODIUM 20 MG: 20 TABLET, DELAYED RELEASE ORAL at 06:28

## 2020-09-15 RX ADMIN — DAPTOMYCIN 600 MG: 500 INJECTION, POWDER, LYOPHILIZED, FOR SOLUTION INTRAVENOUS at 12:45

## 2020-09-15 RX ADMIN — BACITRACIN, NEOMYCIN, POLYMYXIN B 1 SMALL APPLICATION: 400; 3.5; 5 OINTMENT TOPICAL at 07:49

## 2020-09-15 RX ADMIN — HEPARIN SODIUM 5000 UNITS: 5000 INJECTION INTRAVENOUS; SUBCUTANEOUS at 21:48

## 2020-09-15 RX ADMIN — METHIMAZOLE 5 MG: 5 TABLET ORAL at 07:48

## 2020-09-15 RX ADMIN — INSULIN LISPRO 2 UNITS: 100 INJECTION, SOLUTION INTRAVENOUS; SUBCUTANEOUS at 12:46

## 2020-09-15 RX ADMIN — BACITRACIN, NEOMYCIN, POLYMYXIN B 1 SMALL APPLICATION: 400; 3.5; 5 OINTMENT TOPICAL at 17:43

## 2020-09-15 RX ADMIN — INSULIN LISPRO 2 UNITS: 100 INJECTION, SOLUTION INTRAVENOUS; SUBCUTANEOUS at 21:48

## 2020-09-15 RX ADMIN — INSULIN LISPRO 15 UNITS: 100 INJECTION, SOLUTION INTRAVENOUS; SUBCUTANEOUS at 17:44

## 2020-09-15 RX ADMIN — INSULIN LISPRO 1 UNITS: 100 INJECTION, SOLUTION INTRAVENOUS; SUBCUTANEOUS at 07:46

## 2020-09-15 RX ADMIN — CLOPIDOGREL BISULFATE 75 MG: 75 TABLET ORAL at 07:49

## 2020-09-15 RX ADMIN — ASPIRIN 81 MG 81 MG: 81 TABLET ORAL at 07:49

## 2020-09-15 RX ADMIN — INSULIN GLARGINE 30 UNITS: 100 INJECTION, SOLUTION SUBCUTANEOUS at 21:48

## 2020-09-15 RX ADMIN — HEPARIN SODIUM 5000 UNITS: 5000 INJECTION INTRAVENOUS; SUBCUTANEOUS at 06:28

## 2020-09-15 RX ADMIN — ATORVASTATIN CALCIUM 40 MG: 40 TABLET, FILM COATED ORAL at 17:43

## 2020-09-15 RX ADMIN — DOCUSATE SODIUM 100 MG: 100 CAPSULE, LIQUID FILLED ORAL at 07:48

## 2020-09-15 RX ADMIN — INSULIN LISPRO 15 UNITS: 100 INJECTION, SOLUTION INTRAVENOUS; SUBCUTANEOUS at 12:45

## 2020-09-15 RX ADMIN — TAMSULOSIN HYDROCHLORIDE 0.4 MG: 0.4 CAPSULE ORAL at 17:43

## 2020-09-15 RX ADMIN — VERAPAMIL HYDROCHLORIDE 240 MG: 120 TABLET, FILM COATED, EXTENDED RELEASE ORAL at 07:48

## 2020-09-15 NOTE — PROGRESS NOTES
Progress Note - Gilmar Hickman 1965, 60 y o  male MRN: [id number]    Unit/Bed#: -01 Encounter: [id number]    Primary Care Provider: Robb Cavazos MD   Date and time admitted to hospital: 8/28/2020  3:14 PM    * Osteomyelitis [facility])  Assessment & Plan  Recent history of right hallux amputation  Presented with wound dehiscence, wound cellulitis, possible osteomyelitis  2/2 blood culture from 08/28/2020 growing MRSA  Right foot MRI report reviewed and noted for osteomyelitis  Status post right-sided TMA    Was switched from vancomycin to  daptomycin given ATN    Currently afebrile, hemodynamically stable  Encourage for strict nonweightbearing to right lower extremity at this time  Continue dressing changes per podiatrist recommendation  Acute kidney injury (MARKELL) with acute tubular necrosis (ATN) (HCC)  Assessment & Plan  Likely secondary to sepsis/surgery,+/- urinary retention  Creatinine peaked to 2 32 on 9/5  Baseline appears to be 0 8-0 9  Creatinine currently stable an improving  nephro following recs appreciated    Patient required 3 straight catheterization,Mae inserted per protocol  C/w Flomax   Avoid nephrotoxin and hypotension  Medically stable for discharge  follow-up with Nephrology in the outpatient basis    MRSA bacteremia  Assessment & Plan  2/2 culture from 08/28/2020 growing MRSA  Likely source foot wound  Negative 2D echo  Repeat blood cultures remain negative  Continue IV daptomycin total of 4 weeks through 9/27  Weekly CBC with diff, creatinine, vancomycin level  Outpatient ID follow-up after hospital discharge  Vascular surgery, Podiatry following  ID recommendations appreciated      Urinary retention  Assessment & Plan  Required 3 straight straight catheterization,yielded 500 - 700ml urine  Mae inserted 09/8/20  Will be dc with catheter to rehab   Voiding trial at rehab  Started Flomax  Patient denies constipation  Will follow-up outpatient with Urology    Amputation of right great toe [facility])  Assessment & Plan  Now presented again with poor wound healing, wound dehiscence, possible cellulitis, osteomyelitis  Plan is as stated above under 1  And 2  PAD (peripheral artery disease) [facility])  Assessment & Plan  Patient history of recent right popliteal PTA/ stent 08/06/2020 and right hallux amputation  Now noted with osteomyelitis as evident on right foot MRI report  Continue aspirin, statin, Plavix  Follow-up with vascular surgery recommendation as well as podiatry following  Hyperthyroidism  Assessment & Plan  · Diagnosed with thyroid cancer in July  · TSH 0 080  · Free T4 1 39  · Continue home dose of methimazole 5 mg daily  · Recommended close outpatient follow-up  Type 2 diabetes mellitus with diabetic polyneuropathy, with long-term current use of insulin [facility])  Assessment & Plan  Lab Results   Component Value Date    HGBA1C 8 1 (H) 08/28/2020       Recent Labs     09/14/20  1052 09/14/20  1506 09/14/20  2109 09/15/20  0717   POCGLU 165* 290* 279* 161*       Blood Sugar Average: Last 72 hrs:  · (P) 173       Hemoglobin A1c 8 1  Blood sugars improving  Continue Lantus 30 units subcu HS, Humalog 15 units t i d  Continue SSI  Continue to monitor sugar        Hyperlipidemia  Assessment & Plan  · Continue statin    Hypertension, essential  Assessment & Plan  · Controlled  · Continue verapamil  Avalide held secondary to acute kidney injury  · Continue to monitor blood pressure closely  · BP stable        VTE Pharmacologic Prophylaxis:   Pharmacologic: Heparin  Mechanical VTE Prophylaxis in Place: Yes    Patient Centered Rounds: I have performed bedside rounds with nursing staff today  Discussions with Specialists or Other Care Team Provider:  Neurology and case management    Education and Discussions with Family / Patient:  Patient    Time Spent for Care: 30 minutes    More than 50% of total time spent on counseling and coordination of care as described above  Current Length of Stay: 18 day(s)    Current Patient Status: Inpatient   Certification Statement: The patient will continue to require additional inpatient hospital stay due to Placement    Discharge Plan:  Pending placement  Code Status: Level 1 - Full Code      Subjective:   Patient seen and examined  No acute complaints at this time  Anxious for discharge  Objective:     Vitals:   Temp (24hrs), Av °F (36 7 °C), Min:98 °F (36 7 °C), Max:98 °F (36 7 °C)    Temp:  [98 °F (36 7 °C)] 98 °F (36 7 °C)  HR:  [77-86] 86  Resp:  [17-18] 18  BP: (110-138)/(69-88) 138/88  SpO2:  [96 %] 96 %  Body mass index is 30 89 kg/m²  Input and Output Summary (last 24 hours): Intake/Output Summary (Last 24 hours) at 9/15/2020 1013  Last data filed at 9/15/2020 0757  Gross per 24 hour   Intake 960 ml   Output 900 ml   Net 60 ml       Physical Exam:     Physical Exam  Vitals signs and nursing note reviewed  Constitutional:       Appearance: Normal appearance  HENT:      Head: Normocephalic and atraumatic  Neck:      Musculoskeletal: Normal range of motion and neck supple  Cardiovascular:      Rate and Rhythm: Normal rate and regular rhythm  Pulses: Normal pulses  Heart sounds: Normal heart sounds  Pulmonary:      Effort: Pulmonary effort is normal  No respiratory distress  Breath sounds: Normal breath sounds  Abdominal:      General: Abdomen is flat  Bowel sounds are normal    Skin:     General: Skin is warm  Neurological:      General: No focal deficit present  Mental Status: He is alert and oriented to person, place, and time           Additional Data:     Labs:    Results from last 7 days   Lab Units 09/15/20  0630   WBC Thousand/uL 10 46*   HEMOGLOBIN g/dL 9 5*   HEMATOCRIT % 28 9*   PLATELETS Thousands/uL 380   NEUTROS PCT % 61   LYMPHS PCT % 23   MONOS PCT % 11   EOS PCT % 4     Results from last 7 days   Lab Units 09/15/20  0630   SODIUM mmol/L 137 POTASSIUM mmol/L 3 5   CHLORIDE mmol/L 101   CO2 mmol/L 28   BUN mg/dL 32*   CREATININE mg/dL 1 99*   ANION GAP mmol/L 8   CALCIUM mg/dL 8 9   GLUCOSE RANDOM mg/dL 173*         Results from last 7 days   Lab Units 09/15/20  0717 09/14/20  2109 09/14/20  1506 09/14/20  1052 09/14/20  0744 09/13/20  2120 09/13/20  1545 09/13/20  1107 09/13/20  0607 09/12/20  2055 09/12/20  1553 09/12/20  1123   POC GLUCOSE mg/dl 161* 279* 290* 165* 124 167* 76 159* 136 185* 159* 218*                   * I Have Reviewed All Lab Data Listed Above  * Additional Pertinent Lab Tests Reviewed:  Pradeep 66 Admission Reviewed    Imaging:    Imaging Reports Reviewed Today Include:   Imaging Personally Reviewed by Myself Includes:      Recent Cultures (last 7 days):           Last 24 Hours Medication List:   Current Facility-Administered Medications   Medication Dose Route Frequency Provider Last Rate    acetaminophen  650 mg Oral Q6H PRN Girma Ty PA-C      aluminum-magnesium hydroxide-simethicone  30 mL Oral Q6H PRN Genevieve Salinas DPM      aspirin  81 mg Oral Daily Destin Germain      atorvastatin  40 mg Oral Daily With [employer], HERMAN      clopidogrel  75 mg Oral Daily Destin Germain      DAPTOmycin  6 mg/kg (Adjusted) Intravenous Q24H Fiona Aldea,  mg (09/14/20 1500)    docusate sodium  100 mg Oral BID Melo Castro MD      heparin (porcine)  5,000 Units Subcutaneous Q8H [address] NAILA Suarez      insulin glargine  30 Units Subcutaneous HS Lalo Thornton MD      insulin lispro  1-5 Units Subcutaneous 4x Daily (AC & HS) Genevieve Salinas DPM      insulin lispro  15 Units Subcutaneous TID With Meals Genevieve Salinas DPM      LORazepam  0 5 mg Oral BID PRN Genevieve Salinas DPM      methimazole  5 mg Oral Daily Destin Germain      neomycin-bacitracin-polymyxin b  1 small application Topical BID NAILA Galloway      ondansetron  4 mg Intravenous Q6H PRN Genevieve Salinas DPM  oxyCODONE  10 mg Oral Q6H PRN Abby Escoto MD      oxyCODONE  5 mg Oral Q6H PRN Abby Escoto MD      pantoprazole  20 mg Oral Early Morning [facility]      polyethylene glycol  17 g Oral Daily PRN [facility]      tamsulosin  0 4 mg Oral Daily With Mignon Babinski, MD      verapamil  240 mg Oral Daily [place], [place]      zolpidem  10 mg Oral HS PRN [facility], DP          Today, Patient Was Seen By: Abby Escoto MD    ** Please Note: Dictation voice to text software may have been used in the creation of this document   **

## 2020-09-15 NOTE — PLAN OF CARE
Problem: PHYSICAL THERAPY ADULT  Goal: Performs mobility at highest level of function for planned discharge setting  See evaluation for individualized goals  Description: Treatment/Interventions: Functional transfer training, LE strengthening/ROM, Therapeutic exercise, Endurance training, Patient/family training, Equipment eval/education, Bed mobility, Gait training, Compensatory technique education, OT  Equipment Recommended: Other (Comment)(wheelchair, to be determined at rehab)       See flowsheet documentation for full assessment, interventions and recommendations  Note: Prognosis: Good  Problem List: Decreased strength, Decreased endurance, Impaired balance, Decreased mobility, Decreased safety awareness, Orthopedic restrictions  Assessment: Pt seen for PT treatment session this date with interventions consisting of gait training with emphasis on improving pt's ability to ambulate level surfaces x 2 hops with mod A provided by therapist with standard walker and therapeutic activity consisting of training: bed mobility, supine to sit transfers and sit to stand transfers  Pt agreeable to PT treatment session upon arrival, pt found supine in bed, in no apparent distress  In comparison to previous session, pt with improvements in his ability to maintain NWB on R LE, improved transfer status, and pt demonstrated ability to ambulate x 2 hops with moderate assist  Post session: pt returned back to bed and all needs in reach  Continue to recommend STR at time of d/c in order to maximize pt's functional independence and safety with mobility  Pt continues to be functioning below baseline level, and remains limited secondary to factors listed above and including decreased lower extremity strength, impaired balance, decreased endurance, and decreased functional mobility   PT will continue to see pt while here in order to address the deficits listed above and provide interventions consistent with POC in effort to achieve STGs  Barriers to Discharge: Inaccessible home environment     PT Discharge Recommendation: [address]     PT - OK to Discharge: Yes(when medically cleared, if to STR)    See flowsheet documentation for full assessment

## 2020-09-15 NOTE — ASSESSMENT & PLAN NOTE
Likely secondary to sepsis/surgery,+/- urinary retention  Creatinine peaked to 2 32 on 9/5  Baseline appears to be 0 8-0 9  Creatinine currently stable an improving   nephro following recs appreciated    Patient required 3 straight catheterization,Mae inserted per protocol  C/w Flomax   Avoid nephrotoxin and hypotension  Medically stable for discharge  follow-up with Nephrology in the outpatient basis

## 2020-09-15 NOTE — ASSESSMENT & PLAN NOTE
Patient history of recent right popliteal PTA/ stent 08/06/2020 and right hallux amputation  Now noted with osteomyelitis as evident on right foot MRI report  Continue aspirin, statin, Plavix  Follow-up with vascular surgery recommendation as well as podiatry following

## 2020-09-15 NOTE — PLAN OF CARE
Problem: OCCUPATIONAL THERAPY ADULT  Goal: Performs self-care activities at highest level of function for planned discharge setting  See evaluation for individualized goals  Description: Treatment Interventions: ADL retraining, Functional transfer training, UE strengthening/ROM, Endurance training, Patient/family training, Equipment evaluation/education, Compensatory technique education, Energy conservation, Activityengagement, Continued evaluation          See flowsheet documentation for full assessment, interventions and recommendations  Note: Limitation: Decreased ADL status, Decreased Safe judgement during ADL, Decreased endurance  Prognosis: Fair  Assessment: Pt participated in skilled OT session today addressing the following interventions ADL retraining with proper body mechanics, Patient / Family Education, Safety Awareness,Fall Prevention, and Activity tolerance training  Patient agreeable to OT treatment session, upon arrival patient was found alert, responsive  and in no apparent distress  In comparison to previous session, patient with improvements task participation and increasing level of independence  Patient requiring cuing to encourage more function as pt reported he planned in laying in bed all day  Patient continues to be functioning below baseline level, occupational performance remains limited secondary to factors listed above and increased risk for falls and injury  From OT standpoint, recommendation at time of d/c would be post acute rehab  Patient to benefit from continued Occupational Therapy treatment while in the hospital to address deficits as defined above and maximize level of functional independence with ADLs and functional mobility       OT Discharge Recommendation: Post-Acute Rehabilitation Services

## 2020-09-15 NOTE — ASSESSMENT & PLAN NOTE
2/2 culture from 08/28/2020 growing MRSA  Likely source foot wound  Negative 2D echo  Repeat blood cultures remain negative  Continue IV daptomycin total of 4 weeks through 9/27  Weekly CBC with diff, creatinine, vancomycin level  Outpatient ID follow-up after hospital discharge  Vascular surgery, Podiatry following    ID recommendations appreciated

## 2020-09-15 NOTE — ASSESSMENT & PLAN NOTE
Lab Results   Component Value Date    HGBA1C 8 1 (H) 08/28/2020       Recent Labs     09/14/20  1052 09/14/20  1506 09/14/20  2109 09/15/20  0717   POCGLU 165* 290* 279* 161*       Blood Sugar Average: Last 72 hrs:  · (P) 173       Hemoglobin A1c 8 1  Blood sugars improving  Continue Lantus 30 units subcu HS, Humalog 15 units t i d    Continue SSI  Continue to monitor sugar

## 2020-09-15 NOTE — OCCUPATIONAL THERAPY NOTE
Occupational Therapy Treatment Note      Rupa Ortez    9/15/2020    Principal Problem:    Osteomyelitis ([facility] [address] )  Active Problems:    Hypertension, essential    Gastroesophageal reflux disease without esophagitis    Hyperlipidemia    Type 2 diabetes mellitus with diabetic polyneuropathy, with long-term current use of insulin (HCC)    Hyperthyroidism    PAD (peripheral artery disease) (HCC)    Amputation of right great toe (HCC)    MRSA bacteremia    Acute kidney injury (MARKELL) with acute tubular necrosis (ATN) (HCC)    Urinary retention    Hypomagnesemia      Past Medical History:   Diagnosis Date    Diabetes mellitus ([facility] [address] )     Hyperlipidemia     Hypertension     Thyroid cancer [facility])        Past Surgical History:   Procedure Laterality Date    FOOT SURGERY Right 2014    IR AORTAGRAM WITH RUN-OFF  8/6/2020    IR TUNNELED CENTRAL LINE PLACEMENT  9/8/2020    NE AMPUTATION FOOT,TRANSMETATARSAL Right 9/3/2020    Procedure: AMPUTATION TRANSMETATARSAL (TMA);  Surgeon: Brianna Lindquist DPM;  Location: [facility];  Service: Podiatry    TOE AMPUTATION Right 8/11/2020    Procedure: AMPUTATION TOE;  Surgeon: Brianna Lindquist DPM;  Location: [facility];  Service: Podiatry    US GUIDED THYROID BIOPSY  7/2/2020        09/15/20 2083   Restrictions/Precautions   Weight Bearing Precautions Per Order Yes   RLE Weight Bearing Per Order NWB   Braces or Orthoses Other (Comment)  (none per patient)   Other Precautions Fall Risk;Multiple lines   Lifestyle   Autonomy Per pt report, he lives with his significant other in a one-story home with 3 DANIEL with no railings  Patient reports that at baseline, he is independent in ADLs and requires assistance with IADLs from his significant other, including meal preparation and medication management  Patient reports that he has only recently been ambulating short distances in his home and does not use DME at baseline for functional mobility     Reciprocal Relationships Girlfriend is planning on taking vacation time   Service to Others Currently on disability, typically  at [employer] none reported   Pain Assessment   Pain Assessment Tool 0-10   Pain Score No Pain   ADL   Grooming Assistance 5  Supervision/Setup   Grooming Deficit Setup; Increased time to complete;Supervision/safety   UB Bathing Assistance 4  Minimal Assistance   UB Bathing Deficit Verbal cueing;Supervision/safety; Increased time to complete;Setup   LB Bathing Comments pt declined   UB Dressing Assistance 5  Supervision/Setup   UB Dressing Deficit Setup; Increased time to complete;Supervision/safety   Bed Mobility   Rolling R 5  Supervision   Supine to Sit 5  Supervision   Additional items Assist x 1   Sit to Supine 5  Supervision   Additional items Assist x 1   Additional Comments Pt was a co-treat with PT due to pt's request to cluster services   Cognition   Overall Cognitive Status [facility]   Arousal/Participation Alert; Responsive   Attention Within functional limits   Orientation Level Oriented X4   Memory Within functional limits   Following Commands Follows all commands and directions without difficulty   Comments Pt was agreeable to OT session   Assessment   Assessment Pt participated in skilled OT session today addressing the following interventions ADL retraining with proper body mechanics, Patient / Family Education, Safety Awareness,Fall Prevention, and Activity tolerance training  Patient agreeable to OT treatment session, upon arrival patient was found alert, responsive  and in no apparent distress  In comparison to previous session, patient with improvements task participation and increasing level of independence  Patient requiring cuing to encourage more function as pt reported he planned in laying in bed all day  Patient continues to be functioning below baseline level, occupational performance remains limited secondary to factors listed above and increased risk for falls and injury   From OT standpoint, recommendation at time of d/c would be post acute rehab  Patient to benefit from continued Occupational Therapy treatment while in the hospital to address deficits as defined above and maximize level of functional independence with ADLs and functional mobility  Plan   Treatment Interventions ADL retraining; Activityengagement; Energy conservation;Continued evaluation;Patient/family training; Endurance training   Goal Expiration Date 09/18/20   OT Frequency 3-5x/wk   Recommendation   OT Discharge Recommendation Post-Acute Rehabilitation Services   Barthel Index   Feeding 10   Bathing 0   Grooming Score 0   Dressing Score 5   Bladder Score 0   Bowels Score 10   Toilet Use Score 5   Transfers (Bed/Chair) Score 5   Mobility (Level Surface) Score 0   Stairs Score 0   Barthel Index Score 35   Modified Nirali Scale   Modified [place] Scale 4     Paulie Goldstein MS OTR/L

## 2020-09-15 NOTE — PHYSICAL THERAPY NOTE
Physical Therapy Treatment Note     09/15/20 0959   PT Last Visit   PT Visit Date 09/15/20   Pain Assessment   Pain Assessment Tool 0-10   Pain Score No Pain   Restrictions/Precautions   Weight Bearing Precautions Per Order Yes   RLE Weight Bearing Per Order NWB   General   Chart Reviewed Yes   Response to Previous Treatment Patient with no complaints from previous session  Family/Caregiver Present No   Cognition   Overall Cognitive Status WFL   Arousal/Participation Alert; Responsive   Attention Within functional limits   Orientation Level Oriented X4   Memory Within functional limits   Following Commands Follows all commands and directions without difficulty   Comments Pt agreeable to PT  Subjective   Subjective Pt reports, "I cannot walk until I can put weight through my foot "   Bed Mobility   Rolling R 5  Supervision   Supine to Sit 5  Supervision   Additional items Assist x 1   Sit to Supine 5  Supervision   Additional items Assist x 1   Transfers   Sit to Stand 4  Minimal assistance   Additional items Assist x 1;Verbal cues; Increased time required  (cues to maintain NWB)   Stand to Sit 4  Minimal assistance   Additional items Assist x 1   Ambulation/Elevation   Gait pattern Poor UE support; Forward Flexion;Decreased foot clearance   Gait Assistance 3  Moderate assist   Additional items Assist x 1;Verbal cues  (vc's to maintain NWB status on R LE)   Assistive Device Standard walker   Distance 2 hops   Balance   Static Sitting Normal   Dynamic Sitting Good   Static Standing Fair -   Dynamic Standing Poor +   Ambulatory Poor   Endurance Deficit   Endurance Deficit Yes   Activity Tolerance   Activity Tolerance Patient limited by fatigue   Medical Staff Made Aware ILA Vasques   Nurse Made Aware BONI Miller confirmed pt appropriate for PT; at end of session pt was left supine in bed in NAD, all belongings within reach   Assessment   Prognosis Good   Problem List Decreased strength;Decreased endurance; Impaired balance;Decreased mobility; Decreased safety awareness;Orthopedic restrictions   Assessment Pt seen for PT treatment session this date with interventions consisting of gait training with emphasis on improving pt's ability to ambulate level surfaces x 2 hops with mod A provided by therapist with standard walker and therapeutic activity consisting of training: bed mobility, supine to sit transfers and sit to stand transfers  Pt agreeable to PT treatment session upon arrival, pt found supine in bed, in no apparent distress  In comparison to previous session, pt with improvements in his ability to maintain NWB on R LE, improved transfer status, and pt demonstrated ability to ambulate x 2 hops with moderate assist  Post session: pt returned back to bed and all needs in reach  Continue to recommend STR at time of d/c in order to maximize pt's functional independence and safety with mobility  Pt continues to be functioning below baseline level, and remains limited secondary to factors listed above and including decreased lower extremity strength, impaired balance, decreased endurance, and decreased functional mobility  PT will continue to see pt while here in order to address the deficits listed above and provide interventions consistent with POC in effort to achieve [facility]  Barriers to Discharge Inaccessible home environment   Goals   Patient Goals To be able to walk     STG Expiration Date 09/25/20   Short Term Goal #1 In 7-10 days: Increase bilateral LE strength 1/2 grade to facilitate independent mobility, Perform all bed mobility tasks modified independent to decrease caregiver burden, Perform all transfers modified independent to improve independence, Ambulate > 50 ft  with least restrictive assistive device with contact guard assist w/o LOB and w/ normalized gait pattern 100% of the time and Increase all balance 1/2 grade to decrease risk for falls   Plan   Treatment/Interventions Functional transfer training;LE strengthening/ROM; Therapeutic exercise; Endurance training;Patient/family training;Equipment eval/education; Bed mobility;Gait training; Compensatory technique education;OT   Progress Slow progress, decreased activity tolerance   PT Frequency Other (Comment)  (3-5x/wk)   Recommendation   PT Discharge Recommendation Post-Acute Rehabilitation Services   PT - OK to Discharge Yes  (when medically cleared, if to STR)     Joselito Tatum, PT, DPT    Time of PT treatment session: 1788-1868  27 minutes

## 2020-09-15 NOTE — PROGRESS NOTES
NEPHROLOGY PROGRESS NOTE    Patient: Eligio Morgan               Sex: male          DOA: 8/28/2020  3:14 PM   YOB: 1965        Age:  60 y o         LOS:  LOS: 18 days       HPI     Patient admitted with osteomyelitis and was given vancomycin with acute kidney injury    SUBJECTIVE     Patient is feeling quite well  Still has a pain in the leg    Patient does urinary tension requiring Mae catheter    Etiology urinary tension her clear    Patient had acute kidney injury likely because of ATN and vancomycin    Overall feeling quite well    CURRENT MEDICATIONS       Current Facility-Administered Medications:     acetaminophen (TYLENOL) tablet 650 mg, 650 mg, Oral, Q6H PRN, Bessy Broussard PA-C, 650 mg at 09/14/20 2305    aluminum-magnesium hydroxide-simethicone (MYLANTA) 200-200-20 mg/5 mL oral suspension 30 mL, 30 mL, Oral, Q6H PRN, Rashi Rodrigues DPM    aspirin chewable tablet 81 mg, 81 mg, Oral, Daily, Rashi Rodrigues DPM, 81 mg at 09/15/20 0749    atorvastatin (LIPITOR) tablet 40 mg, 40 mg, Oral, Daily With Rosalinda Peterson, DPM, 40 mg at 09/14/20 1754    clopidogrel (PLAVIX) tablet 75 mg, 75 mg, Oral, Daily, Rashi Rodrigues DPM, 75 mg at 09/15/20 0749    DAPTOmycin (CUBICIN) 600 mg in sodium chloride 0 9 % 50 mL IVPB, 6 mg/kg (Adjusted), Intravenous, Q24H, Fiona Cortes DO, Last Rate: 100 mL/hr at 09/14/20 1500, 600 mg at 09/14/20 1500    docusate sodium (COLACE) capsule 100 mg, 100 mg, Oral, BID, Lalo Thornton MD, 100 mg at 09/15/20 0748    heparin (porcine) subcutaneous injection 5,000 Units, 5,000 Units, Subcutaneous, Q8H [address], NAILA Suarez, 5,000 Units at 09/15/20 0628    insulin glargine (LANTUS) subcutaneous injection 30 Units 0 3 mL, 30 Units, Subcutaneous, HS, Kaci Hernandez MD, 30 Units at 09/14/20 2114    insulin lispro (HumaLOG) 100 units/mL subcutaneous injection 1-5 Units, 1-5 Units, Subcutaneous, 4x Daily (AC & HS), 1 Units at 09/15/20 0746 **AND** Fingerstick Glucose (POCT), , , 4x Daily AC and at bedtime, Ulice Jerrica, DPM    insulin lispro (HumaLOG) 100 units/mL subcutaneous injection 15 Units, 15 Units, Subcutaneous, TID With Meals, Ulice Jerrica, DPM, Stopped at 09/14/20 1755    LORazepam (ATIVAN) tablet 0 5 mg, 0 5 mg, Oral, BID PRN, Ulice Jerrica, DPM    methimazole (TAPAZOLE) tablet 5 mg, 5 mg, Oral, Daily, Ulice Jerrica, DPM, 5 mg at 09/15/20 0748    neomycin-bacitracin-polymyxin b (NEOSPORIN) ointment 1 small application, 1 small application, Topical, BID, Maximo Mary, NAILA, 1 small application at 08/57/76 0749    ondansetron (ZOFRAN) injection 4 mg, 4 mg, Intravenous, Q6H PRN, Ulice Jerrica, DPM, 4 mg at 09/03/20 1848    oxyCODONE (ROXICODONE) immediate release tablet 10 mg, 10 mg, Oral, Q6H PRN, Elfego Ann MD, 10 mg at 09/03/20 1432    oxyCODONE (ROXICODONE) IR tablet 5 mg, 5 mg, Oral, Q6H PRN, Elfego Ann MD    pantoprazole (PROTONIX) EC tablet 20 mg, 20 mg, Oral, Early Morning, Ulice Jerrica, DPM, 20 mg at 09/15/20 1930    polyethylene glycol (MIRALAX) packet 17 g, 17 g, Oral, Daily PRN, Ulice Jerrica, DPM    [facility]) capsule 0 4 mg, 0 4 mg, Oral, Daily With Manav Biswas MD, 0 4 mg at 09/14/20 1754    verapamil (CALAN-SR) CR tablet 240 mg, 240 mg, Oral, Daily, Ulice Jerrica, DPM, 240 mg at 09/15/20 0748    zolpidem (AMBIEN) tablet 10 mg, 10 mg, Oral, HS PRN, Ulice Jerrica, DPM, 10 mg at 08/31/20 2322    OBJECTIVE     Current Weight: Weight - Scale: 115 kg (253 lb 12 oz)  Vitals:    09/15/20 0746   BP: 138/88   Pulse: 86   Resp: 18   Temp: 98 °F (36 7 °C)   SpO2:        Intake/Output Summary (Last 24 hours) at 9/15/2020 1137  Last data filed at 9/15/2020 0757  Gross per 24 hour   Intake 960 ml   Output 900 ml   Net 60 ml       PHYSICAL EXAMINATION     Physical Exam  Constitutional:       General: He is not in acute distress  Appearance: He is well-developed  HENT:      Head: Normocephalic     Eyes: General: No scleral icterus  Conjunctiva/sclera: Conjunctivae normal    Neck:      Musculoskeletal: Neck supple  Vascular: No JVD  Cardiovascular:      Rate and Rhythm: Normal rate  Heart sounds: Normal heart sounds  Pulmonary:      Effort: Pulmonary effort is normal       Breath sounds: No wheezing  Abdominal:      Palpations: Abdomen is soft  Tenderness: There is no abdominal tenderness  Musculoskeletal: Normal range of motion  Skin:     General: Skin is warm  Findings: No rash  Neurological:      Mental Status: He is alert and oriented to person, place, and time  Psychiatric:         Behavior: Behavior normal           LAB RESULTS     Results from last 7 days   Lab Units 09/15/20  0630 09/14/20  0437 09/13/20  0642 09/12/20  0759 09/11/20  0448 09/10/20  0522 09/09/20  0435 09/09/20  0431   WBC Thousand/uL 10 46* 10 62* 11 12* 11 64* 12 68* 11 86* 11 64*  --    HEMOGLOBIN g/dL 9 5* 9 2* 9 6* 9 6* 9 3* 9 4* 9 5*  --    HEMATOCRIT % 28 9* 27 7* 29 0* 28 9* 27 9* 28 2* 28 4*  --    PLATELETS Thousands/uL 380 379 360 361 337 334 334  --    POTASSIUM mmol/L 3 5 3 3* 3 5 3 5 3 4* 3 5  --  3 6   CHLORIDE mmol/L 101 102 99* 101 100 103  --  102   CO2 mmol/L 28 27 28 25 26 26  --  25   BUN mg/dL 32* 30* 29* 30* 31* 27*  --  30*   CREATININE mg/dL 1 99* 2 07* 2 00* 2 14* 2 24* 2 14*  --  2 07*   EGFR ml/min/1 73sq m 36 35 36 33 31 33  --  35   CALCIUM mg/dL 8 9 8 7 9 1 9 0 8 5 8 6  --  8 3   MAGNESIUM mg/dL  --   --   --   --   --  1 8  --  1 3*       RADIOLOGY RESULTS      No results found for this or any previous visit  Results for orders placed during the hospital encounter of 11/15/18   XR chest 2 views    Narrative CHEST     INDICATION:   n/v, dizziness  COMPARISON:  None    EXAM PERFORMED/VIEWS:  XR CHEST PA & LATERAL      FINDINGS:    Cardiac silhouette is enlarged  Aortic calcification is present  Mildly ectatic ascending aorta      No airspace consolidation, pneumothorax, pulmonary edema, or pleural effusion  Osseous structures appear within normal limits for patient age  Impression No radiographic evidence of acute intrathoracic process  Workstation performed: [id number]       No results found for this or any previous visit  No results found for this or any previous visit  No results found for this or any previous visit  No results found for this or any previous visit  PLAN / RECOMMENDATIONS      Acute kidney injury:  Likely because of ATN with vancomycin  Stable at creatinine of 2  Urinary tension:  Etiology unclear  Has a Mae catheter and urinating well with help of Mae catheter  Will need urologist as outpatient    Osteomyelitis:  On daptomycin    Disposition:  Can be discharged renal point of view advised nephrology follow-up as outpatient      Luan Live MD  Nephrology  9/15/2020        Portions of the record may have been created with voice recognition software  Occasional wrong word or "sound a like" substitutions may have occurred due to the inherent limitations of voice recognition software  Read the chart carefully and recognize, using context, where substitutions have occurred

## 2020-09-15 NOTE — ASSESSMENT & PLAN NOTE
Required 3 straight straight catheterization,yielded 500 - 700ml urine  Mae inserted 09/8/20  Will be dc with catheter to rehab   Voiding trial at rehab  Started Flomax  Patient denies constipation  Will follow-up outpatient with Urology

## 2020-09-15 NOTE — PLAN OF CARE
Problem: Potential for Falls  Goal: Patient will remain free of falls  Description: INTERVENTIONS:  - Assess patient frequently for physical needs  -  Identify cognitive and physical deficits and behaviors that affect risk of falls    -  [place] fall precautions as indicated by assessment   - Educate patient/family on patient safety including physical limitations  - Instruct patient to call for assistance with activity based on assessment  - Modify environment to reduce risk of injury  - Consider OT/PT consult to assist with strengthening/mobility  Outcome: Progressing     Problem: PAIN - ADULT  Goal: Verbalizes/displays adequate comfort level or baseline comfort level  Description: Interventions:  - Encourage patient to monitor pain and request assistance  - Assess pain using appropriate pain scale  - Administer analgesics based on type and severity of pain and evaluate response  - Implement non-pharmacological measures as appropriate and evaluate response  - Consider cultural and social influences on pain and pain management  - Notify physician/advanced practitioner if interventions unsuccessful or patient reports new pain  Outcome: Progressing     Problem: INFECTION - ADULT  Goal: Absence or prevention of progression during hospitalization  Description: INTERVENTIONS:  - Assess and monitor for signs and symptoms of infection  - Monitor lab/diagnostic results  - Monitor all insertion sites, i e  indwelling lines, tubes, and drains  - Monitor endotracheal if appropriate and nasal secretions for changes in amount and color  - [place] appropriate cooling/warming therapies per order  - Administer medications as ordered  - Instruct and encourage patient and family to use good hand hygiene technique  - Identify and instruct in appropriate isolation precautions for identified infection/condition  Outcome: Progressing  Goal: Absence of fever/infection during neutropenic period  Description: INTERVENTIONS:  - Monitor WBC    Outcome: Progressing     Problem: SAFETY ADULT  Goal: Patient will remain free of falls  Description: INTERVENTIONS:  - Assess patient frequently for physical needs  -  Identify cognitive and physical deficits and behaviors that affect risk of falls    -  [place] fall precautions as indicated by assessment   - Educate patient/family on patient safety including physical limitations  - Instruct patient to call for assistance with activity based on assessment  - Modify environment to reduce risk of injury  - Consider OT/PT consult to assist with strengthening/mobility  Outcome: Progressing  Goal: Maintain or return to baseline ADL function  Description: INTERVENTIONS:  -  Assess patient's ability to carry out ADLs; assess patient's baseline for ADL function and identify physical deficits which impact ability to perform ADLs (bathing, care of mouth/teeth, toileting, grooming, dressing, etc )  - Assess/evaluate cause of self-care deficits   - Assess range of motion  - Assess patient's mobility; develop plan if impaired  - Assess patient's need for assistive devices and provide as appropriate  - Encourage maximum independence but intervene and supervise when necessary  - Involve family in performance of ADLs  - Assess for home care needs following discharge   - Consider OT consult to assist with ADL evaluation and planning for discharge  - Provide patient education as appropriate  Outcome: Progressing  Goal: Maintain or return mobility status to optimal level  Description: INTERVENTIONS:  - Assess patient's baseline mobility status (ambulation, transfers, stairs, etc )    - Identify cognitive and physical deficits and behaviors that affect mobility  - Identify mobility aids required to assist with transfers and/or ambulation (gait belt, sit-to-stand, lift, walker, cane, etc )  - [place] fall precautions as indicated by assessment  - Record patient progress and toleration of activity level on Mobility SBAR; progress patient to next Phase/Stage  - Instruct patient to call for assistance with activity based on assessment  - Consider rehabilitation consult to assist with strengthening/weightbearing, etc   Outcome: Progressing     Problem: DISCHARGE PLANNING  Goal: Discharge to home or other facility with appropriate resources  Description: INTERVENTIONS:  - Identify barriers to discharge w/patient and caregiver  - Arrange for needed discharge resources and transportation as appropriate  - Identify discharge learning needs (meds, wound care, etc )  - Arrange for interpretive services to assist at discharge as needed  - Refer to Case Management Department for coordinating discharge planning if the patient needs post-hospital services based on physician/advanced practitioner order or complex needs related to functional status, cognitive ability, or social support system  Outcome: Progressing     Problem: Knowledge Deficit  Goal: Patient/family/caregiver demonstrates understanding of disease process, treatment plan, medications, and discharge instructions  Description: Complete learning assessment and assess knowledge base  Interventions:  - Provide teaching at level of understanding  - Provide teaching via preferred learning methods  Outcome: Progressing     Problem: Nutrition/Hydration-ADULT  Goal: Nutrient/Hydration intake appropriate for improving, restoring or maintaining nutritional needs  Description: Monitor and assess patient's nutrition/hydration status for malnutrition  Collaborate with interdisciplinary team and initiate plan and interventions as ordered  Monitor patient's weight and dietary intake as ordered or per policy  Utilize nutrition screening tool and intervene as necessary  Determine patient's food preferences and provide high-protein, high-caloric foods as appropriate       INTERVENTIONS:  - Monitor oral intake, urinary output, labs, and treatment plans  - Assess nutrition and hydration status and recommend course of action  - Evaluate amount of meals eaten  - Assist patient with eating if necessary   - Allow adequate time for meals  - Recommend/ encourage appropriate diets, oral nutritional supplements, and vitamin/mineral supplements  - Order, calculate, and assess calorie counts as needed  - Recommend, monitor, and adjust tube feedings and TPN/PPN based on assessed needs  - Assess need for intravenous fluids  - Provide specific nutrition/hydration education as appropriate  - Include patient/family/caregiver in decisions related to nutrition  Outcome: Progressing     Problem: Prexisting or High Potential for Compromised Skin Integrity  Goal: Skin integrity is maintained or improved  Description: INTERVENTIONS:  - Identify patients at risk for skin breakdown  - Assess and monitor skin integrity  - Assess and monitor nutrition and hydration status  - Monitor labs   - Assess for incontinence   - Turn and reposition patient  - Assist with mobility/ambulation  - Relieve pressure over bony prominences  - Avoid friction and shearing  - Provide appropriate hygiene as needed including keeping skin clean and dry  - Evaluate need for skin moisturizer/barrier cream  - Collaborate with interdisciplinary team   - Patient/family teaching  - Consider wound care consult   Outcome: Progressing

## 2020-09-15 NOTE — ASSESSMENT & PLAN NOTE
Recent history of right hallux amputation  Presented with wound dehiscence, wound cellulitis, possible osteomyelitis  2/2 blood culture from 08/28/2020 growing MRSA  Right foot MRI report reviewed and noted for osteomyelitis  Status post right-sided TMA    Was switched from vancomycin to  daptomycin given ATN    Currently afebrile, hemodynamically stable  Encourage for strict nonweightbearing to right lower extremity at this time  Continue dressing changes per podiatrist recommendation

## 2020-09-16 LAB
ANION GAP SERPL CALCULATED.3IONS-SCNC: 8 MMOL/L (ref 4–13)
BASOPHILS # BLD AUTO: 0.08 THOUSANDS/ΜL (ref 0–0.1)
BASOPHILS NFR BLD AUTO: 1 % (ref 0–1)
BUN SERPL-MCNC: 32 MG/DL (ref 5–25)
CALCIUM SERPL-MCNC: 8.9 MG/DL (ref 8.3–10.1)
CHLORIDE SERPL-SCNC: 101 MMOL/L (ref 100–108)
CO2 SERPL-SCNC: 28 MMOL/L (ref 21–32)
CREAT SERPL-MCNC: 1.96 MG/DL (ref 0.6–1.3)
EOSINOPHIL # BLD AUTO: 0.49 THOUSAND/ΜL (ref 0–0.61)
EOSINOPHIL NFR BLD AUTO: 5 % (ref 0–6)
ERYTHROCYTE [DISTWIDTH] IN BLOOD BY AUTOMATED COUNT: 12.2 % (ref 11.6–15.1)
GFR SERPL CREATININE-BSD FRML MDRD: 37 ML/MIN/1.73SQ M
GLUCOSE SERPL-MCNC: 130 MG/DL (ref 65–140)
GLUCOSE SERPL-MCNC: 137 MG/DL (ref 65–140)
GLUCOSE SERPL-MCNC: 143 MG/DL (ref 65–140)
GLUCOSE SERPL-MCNC: 145 MG/DL (ref 65–140)
GLUCOSE SERPL-MCNC: 153 MG/DL (ref 65–140)
GLUCOSE SERPL-MCNC: 170 MG/DL (ref 65–140)
GLUCOSE SERPL-MCNC: 180 MG/DL (ref 65–140)
HCT VFR BLD AUTO: 29.4 % (ref 36.5–49.3)
HGB BLD-MCNC: 9.6 G/DL (ref 12–17)
IMM GRANULOCYTES # BLD AUTO: 0.04 THOUSAND/UL (ref 0–0.2)
IMM GRANULOCYTES NFR BLD AUTO: 0 % (ref 0–2)
LYMPHOCYTES # BLD AUTO: 2.64 THOUSANDS/ΜL (ref 0.6–4.47)
LYMPHOCYTES NFR BLD AUTO: 24 % (ref 14–44)
MCH RBC QN AUTO: 29.1 PG (ref 26.8–34.3)
MCHC RBC AUTO-ENTMCNC: 32.7 G/DL (ref 31.4–37.4)
MCV RBC AUTO: 89 FL (ref 82–98)
MONOCYTES # BLD AUTO: 1.05 THOUSAND/ΜL (ref 0.17–1.22)
MONOCYTES NFR BLD AUTO: 10 % (ref 4–12)
NEUTROPHILS # BLD AUTO: 6.64 THOUSANDS/ΜL (ref 1.85–7.62)
NEUTS SEG NFR BLD AUTO: 60 % (ref 43–75)
NRBC BLD AUTO-RTO: 0 /100 WBCS
PLATELET # BLD AUTO: 398 THOUSANDS/UL (ref 149–390)
PMV BLD AUTO: 10.3 FL (ref 8.9–12.7)
POTASSIUM SERPL-SCNC: 3.5 MMOL/L (ref 3.5–5.3)
RBC # BLD AUTO: 3.3 MILLION/UL (ref 3.88–5.62)
SODIUM SERPL-SCNC: 137 MMOL/L (ref 136–145)
WBC # BLD AUTO: 10.94 THOUSAND/UL (ref 4.31–10.16)

## 2020-09-16 PROCEDURE — 99232 SBSQ HOSP IP/OBS MODERATE 35: CPT | Performed by: INTERNAL MEDICINE

## 2020-09-16 PROCEDURE — 85025 COMPLETE CBC W/AUTO DIFF WBC: CPT | Performed by: INTERNAL MEDICINE

## 2020-09-16 PROCEDURE — 97535 SELF CARE MNGMENT TRAINING: CPT

## 2020-09-16 PROCEDURE — 82948 REAGENT STRIP/BLOOD GLUCOSE: CPT

## 2020-09-16 PROCEDURE — 80048 BASIC METABOLIC PNL TOTAL CA: CPT | Performed by: INTERNAL MEDICINE

## 2020-09-16 RX ADMIN — INSULIN LISPRO 15 UNITS: 100 INJECTION, SOLUTION INTRAVENOUS; SUBCUTANEOUS at 18:06

## 2020-09-16 RX ADMIN — BACITRACIN, NEOMYCIN, POLYMYXIN B 1 SMALL APPLICATION: 400; 3.5; 5 OINTMENT TOPICAL at 18:05

## 2020-09-16 RX ADMIN — ASPIRIN 81 MG 81 MG: 81 TABLET ORAL at 08:01

## 2020-09-16 RX ADMIN — DAPTOMYCIN 600 MG: 500 INJECTION, POWDER, LYOPHILIZED, FOR SOLUTION INTRAVENOUS at 11:58

## 2020-09-16 RX ADMIN — HEPARIN SODIUM 5000 UNITS: 5000 INJECTION INTRAVENOUS; SUBCUTANEOUS at 05:05

## 2020-09-16 RX ADMIN — PANTOPRAZOLE SODIUM 20 MG: 20 TABLET, DELAYED RELEASE ORAL at 05:05

## 2020-09-16 RX ADMIN — TAMSULOSIN HYDROCHLORIDE 0.4 MG: 0.4 CAPSULE ORAL at 18:05

## 2020-09-16 RX ADMIN — HEPARIN SODIUM 5000 UNITS: 5000 INJECTION INTRAVENOUS; SUBCUTANEOUS at 22:07

## 2020-09-16 RX ADMIN — ATORVASTATIN CALCIUM 40 MG: 40 TABLET, FILM COATED ORAL at 18:04

## 2020-09-16 RX ADMIN — INSULIN LISPRO 15 UNITS: 100 INJECTION, SOLUTION INTRAVENOUS; SUBCUTANEOUS at 13:12

## 2020-09-16 RX ADMIN — BACITRACIN, NEOMYCIN, POLYMYXIN B 1 SMALL APPLICATION: 400; 3.5; 5 OINTMENT TOPICAL at 08:02

## 2020-09-16 RX ADMIN — INSULIN GLARGINE 30 UNITS: 100 INJECTION, SOLUTION SUBCUTANEOUS at 22:07

## 2020-09-16 RX ADMIN — INSULIN LISPRO 15 UNITS: 100 INJECTION, SOLUTION INTRAVENOUS; SUBCUTANEOUS at 08:02

## 2020-09-16 RX ADMIN — METHIMAZOLE 5 MG: 5 TABLET ORAL at 08:01

## 2020-09-16 RX ADMIN — INSULIN LISPRO 1 UNITS: 100 INJECTION, SOLUTION INTRAVENOUS; SUBCUTANEOUS at 18:05

## 2020-09-16 RX ADMIN — VERAPAMIL HYDROCHLORIDE 240 MG: 120 TABLET, FILM COATED, EXTENDED RELEASE ORAL at 08:01

## 2020-09-16 RX ADMIN — CLOPIDOGREL BISULFATE 75 MG: 75 TABLET ORAL at 08:01

## 2020-09-16 RX ADMIN — DOCUSATE SODIUM 100 MG: 100 CAPSULE, LIQUID FILLED ORAL at 08:02

## 2020-09-16 NOTE — PLAN OF CARE
Problem: Potential for Falls  Goal: Patient will remain free of falls  Description: INTERVENTIONS:  - Assess patient frequently for physical needs  -  Identify cognitive and physical deficits and behaviors that affect risk of falls    -  [place] fall precautions as indicated by assessment   - Educate patient/family on patient safety including physical limitations  - Instruct patient to call for assistance with activity based on assessment  - Modify environment to reduce risk of injury  - Consider OT/PT consult to assist with strengthening/mobility  Outcome: Progressing     Problem: PAIN - ADULT  Goal: Verbalizes/displays adequate comfort level or baseline comfort level  Description: Interventions:  - Encourage patient to monitor pain and request assistance  - Assess pain using appropriate pain scale  - Administer analgesics based on type and severity of pain and evaluate response  - Implement non-pharmacological measures as appropriate and evaluate response  - Consider cultural and social influences on pain and pain management  - Notify physician/advanced practitioner if interventions unsuccessful or patient reports new pain  Outcome: Progressing     Problem: INFECTION - ADULT  Goal: Absence or prevention of progression during hospitalization  Description: INTERVENTIONS:  - Assess and monitor for signs and symptoms of infection  - Monitor lab/diagnostic results  - Monitor all insertion sites, i e  indwelling lines, tubes, and drains  - Monitor endotracheal if appropriate and nasal secretions for changes in amount and color  - [place] appropriate cooling/warming therapies per order  - Administer medications as ordered  - Instruct and encourage patient and family to use good hand hygiene technique  - Identify and instruct in appropriate isolation precautions for identified infection/condition  Outcome: Progressing  Goal: Absence of fever/infection during neutropenic period  Description: INTERVENTIONS:  - Monitor WBC    Outcome: Progressing     Problem: SAFETY ADULT  Goal: Patient will remain free of falls  Description: INTERVENTIONS:  - Assess patient frequently for physical needs  -  Identify cognitive and physical deficits and behaviors that affect risk of falls    -  [place] fall precautions as indicated by assessment   - Educate patient/family on patient safety including physical limitations  - Instruct patient to call for assistance with activity based on assessment  - Modify environment to reduce risk of injury  - Consider OT/PT consult to assist with strengthening/mobility  Outcome: Progressing  Goal: Maintain or return to baseline ADL function  Description: INTERVENTIONS:  -  Assess patient's ability to carry out ADLs; assess patient's baseline for ADL function and identify physical deficits which impact ability to perform ADLs (bathing, care of mouth/teeth, toileting, grooming, dressing, etc )  - Assess/evaluate cause of self-care deficits   - Assess range of motion  - Assess patient's mobility; develop plan if impaired  - Assess patient's need for assistive devices and provide as appropriate  - Encourage maximum independence but intervene and supervise when necessary  - Involve family in performance of ADLs  - Assess for home care needs following discharge   - Consider OT consult to assist with ADL evaluation and planning for discharge  - Provide patient education as appropriate  Outcome: Progressing  Goal: Maintain or return mobility status to optimal level  Description: INTERVENTIONS:  - Assess patient's baseline mobility status (ambulation, transfers, stairs, etc )    - Identify cognitive and physical deficits and behaviors that affect mobility  - Identify mobility aids required to assist with transfers and/or ambulation (gait belt, sit-to-stand, lift, walker, cane, etc )  - [place] fall precautions as indicated by assessment  - Record patient progress and toleration of activity level on Mobility SBAR; progress patient to next Phase/Stage  - Instruct patient to call for assistance with activity based on assessment  - Consider rehabilitation consult to assist with strengthening/weightbearing, etc   Outcome: Progressing     Problem: DISCHARGE PLANNING  Goal: Discharge to home or other facility with appropriate resources  Description: INTERVENTIONS:  - Identify barriers to discharge w/patient and caregiver  - Arrange for needed discharge resources and transportation as appropriate  - Identify discharge learning needs (meds, wound care, etc )  - Arrange for interpretive services to assist at discharge as needed  - Refer to Case Management Department for coordinating discharge planning if the patient needs post-hospital services based on physician/advanced practitioner order or complex needs related to functional status, cognitive ability, or social support system  Outcome: Progressing     Problem: Knowledge Deficit  Goal: Patient/family/caregiver demonstrates understanding of disease process, treatment plan, medications, and discharge instructions  Description: Complete learning assessment and assess knowledge base  Interventions:  - Provide teaching at level of understanding  - Provide teaching via preferred learning methods  Outcome: Progressing     Problem: Nutrition/Hydration-ADULT  Goal: Nutrient/Hydration intake appropriate for improving, restoring or maintaining nutritional needs  Description: Monitor and assess patient's nutrition/hydration status for malnutrition  Collaborate with interdisciplinary team and initiate plan and interventions as ordered  Monitor patient's weight and dietary intake as ordered or per policy  Utilize nutrition screening tool and intervene as necessary  Determine patient's food preferences and provide high-protein, high-caloric foods as appropriate       INTERVENTIONS:  - Monitor oral intake, urinary output, labs, and treatment plans  - Assess nutrition and hydration status and recommend course of action  - Evaluate amount of meals eaten  - Assist patient with eating if necessary   - Allow adequate time for meals  - Recommend/ encourage appropriate diets, oral nutritional supplements, and vitamin/mineral supplements  - Order, calculate, and assess calorie counts as needed  - Recommend, monitor, and adjust tube feedings and TPN/PPN based on assessed needs  - Assess need for intravenous fluids  - Provide specific nutrition/hydration education as appropriate  - Include patient/family/caregiver in decisions related to nutrition  Outcome: Progressing     Problem: Prexisting or High Potential for Compromised Skin Integrity  Goal: Skin integrity is maintained or improved  Description: INTERVENTIONS:  - Identify patients at risk for skin breakdown  - Assess and monitor skin integrity  - Assess and monitor nutrition and hydration status  - Monitor labs   - Assess for incontinence   - Turn and reposition patient  - Assist with mobility/ambulation  - Relieve pressure over bony prominences  - Avoid friction and shearing  - Provide appropriate hygiene as needed including keeping skin clean and dry  - Evaluate need for skin moisturizer/barrier cream  - Collaborate with interdisciplinary team   - Patient/family teaching  - Consider wound care consult   Outcome: Progressing

## 2020-09-16 NOTE — PLAN OF CARE
Problem: OCCUPATIONAL THERAPY ADULT  Goal: Performs self-care activities at highest level of function for planned discharge setting  See evaluation for individualized goals  Description: Treatment Interventions: ADL retraining, Activityengagement, Energy conservation, Continued evaluation, Patient/family training, Endurance training          See flowsheet documentation for full assessment, interventions and recommendations  Outcome: Progressing  Note: Limitation: Decreased ADL status, Decreased Safe judgement during ADL, Decreased endurance  Prognosis: Fair  Assessment: Patient participated in Skilled OT session this date with interventions consisting of ADL re training with the use of correct body mechnaics, safety awareness and fall prevention techniques,  therapeutic activities to: increase activity tolerance and increase dynamic sit/ stand balance during functional activity    Patient agreeable to OT treatment session, upon arrival patient was found supine in bed, alert, responsive  and in no apparent distress  In comparison to previous session, patient with improvements in activity tolerance, task engagement, and ADL participation/performance  Patient participated in ADLs and simulated ADLs at EOB, please see above flowsheet for ADL assist levels  Patient demonstrated good static and dynamic sitting balance while seated EOB  Patient requiring verbal cues for correct technique and ocassional safety reminders  Patient continues to be functioning below baseline level, occupational performance remains limited secondary to factors listed above and increased risk for falls and injury  From OT standpoint, recommendation at time of d/c would be post-acute rehabilitation services  Patient to benefit from continued Occupational Therapy treatment while in the hospital to address deficits as defined above and maximize level of functional independence with ADLs and functional mobility        OT Discharge Recommendation: Post-Acute Rehabilitation Services

## 2020-09-16 NOTE — PLAN OF CARE
Problem: Potential for Falls  Goal: Patient will remain free of falls  Description: INTERVENTIONS:  - Assess patient frequently for physical needs  -  Identify cognitive and physical deficits and behaviors that affect risk of falls    -  [place] fall precautions as indicated by assessment   - Educate patient/family on patient safety including physical limitations  - Instruct patient to call for assistance with activity based on assessment  - Modify environment to reduce risk of injury  - Consider OT/PT consult to assist with strengthening/mobility  9/16/2020 0940 by Donnie Minor RN  Outcome: Progressing  9/16/2020 0939 by Donnie Minor RN  Outcome: Progressing     Problem: PAIN - ADULT  Goal: Verbalizes/displays adequate comfort level or baseline comfort level  Description: Interventions:  - Encourage patient to monitor pain and request assistance  - Assess pain using appropriate pain scale  - Administer analgesics based on type and severity of pain and evaluate response  - Implement non-pharmacological measures as appropriate and evaluate response  - Consider cultural and social influences on pain and pain management  - Notify physician/advanced practitioner if interventions unsuccessful or patient reports new pain  9/16/2020 0940 by Donnie Minor RN  Outcome: Progressing  9/16/2020 0939 by Donnie Minor RN  Outcome: Progressing     Problem: INFECTION - ADULT  Goal: Absence or prevention of progression during hospitalization  Description: INTERVENTIONS:  - Assess and monitor for signs and symptoms of infection  - Monitor lab/diagnostic results  - Monitor all insertion sites, i e  indwelling lines, tubes, and drains  - Monitor endotracheal if appropriate and nasal secretions for changes in amount and color  - [place] appropriate cooling/warming therapies per order  - Administer medications as ordered  - Instruct and encourage patient and family to use good hand hygiene technique  - Identify and instruct in appropriate isolation precautions for identified infection/condition  9/16/2020 0940 by Myron Patel RN  Outcome: Progressing  9/16/2020 0939 by Myron Patel RN  Outcome: Progressing  Goal: Absence of fever/infection during neutropenic period  Description: INTERVENTIONS:  - Monitor WBC    9/16/2020 0940 by Myron Patel RN  Outcome: Progressing  9/16/2020 0939 by Myron Patel RN  Outcome: Progressing     Problem: SAFETY ADULT  Goal: Patient will remain free of falls  Description: INTERVENTIONS:  - Assess patient frequently for physical needs  -  Identify cognitive and physical deficits and behaviors that affect risk of falls    -  [place] fall precautions as indicated by assessment   - Educate patient/family on patient safety including physical limitations  - Instruct patient to call for assistance with activity based on assessment  - Modify environment to reduce risk of injury  - Consider OT/PT consult to assist with strengthening/mobility  9/16/2020 0940 by Myron Patel RN  Outcome: Progressing  9/16/2020 0939 by Myron Patel RN  Outcome: Progressing  Goal: Maintain or return to baseline ADL function  Description: INTERVENTIONS:  -  Assess patient's ability to carry out ADLs; assess patient's baseline for ADL function and identify physical deficits which impact ability to perform ADLs (bathing, care of mouth/teeth, toileting, grooming, dressing, etc )  - Assess/evaluate cause of self-care deficits   - Assess range of motion  - Assess patient's mobility; develop plan if impaired  - Assess patient's need for assistive devices and provide as appropriate  - Encourage maximum independence but intervene and supervise when necessary  - Involve family in performance of ADLs  - Assess for home care needs following discharge   - Consider OT consult to assist with ADL evaluation and planning for discharge  - Provide patient education as appropriate  9/16/2020 0940 by Jarrod Booth Guillaume Matias RN  Outcome: Progressing  9/16/2020 0939 by John Chino RN  Outcome: Progressing  Goal: Maintain or return mobility status to optimal level  Description: INTERVENTIONS:  - Assess patient's baseline mobility status (ambulation, transfers, stairs, etc )    - Identify cognitive and physical deficits and behaviors that affect mobility  - Identify mobility aids required to assist with transfers and/or ambulation (gait belt, sit-to-stand, lift, walker, cane, etc )  - [place] fall precautions as indicated by assessment  - Record patient progress and toleration of activity level on Mobility SBAR; progress patient to next Phase/Stage  - Instruct patient to call for assistance with activity based on assessment  - Consider rehabilitation consult to assist with strengthening/weightbearing, etc   9/16/2020 0940 by John Chino RN  Outcome: Progressing  9/16/2020 0939 by John Chino RN  Outcome: Progressing     Problem: DISCHARGE PLANNING  Goal: Discharge to home or other facility with appropriate resources  Description: INTERVENTIONS:  - Identify barriers to discharge w/patient and caregiver  - Arrange for needed discharge resources and transportation as appropriate  - Identify discharge learning needs (meds, wound care, etc )  - Arrange for interpretive services to assist at discharge as needed  - Refer to Case Management Department for coordinating discharge planning if the patient needs post-hospital services based on physician/advanced practitioner order or complex needs related to functional status, cognitive ability, or social support system  9/16/2020 0940 by John Chino RN  Outcome: Progressing  9/16/2020 0939 by John Chino RN  Outcome: Progressing     Problem: Knowledge Deficit  Goal: Patient/family/caregiver demonstrates understanding of disease process, treatment plan, medications, and discharge instructions  Description: Complete learning assessment and assess knowledge base   Interventions:  - Provide teaching at level of understanding  - Provide teaching via preferred learning methods  9/16/2020 0940 by Toyin Huang RN  Outcome: Progressing  9/16/2020 0939 by Toyin Huang RN  Outcome: Progressing     Problem: Nutrition/Hydration-ADULT  Goal: Nutrient/Hydration intake appropriate for improving, restoring or maintaining nutritional needs  Description: Monitor and assess patient's nutrition/hydration status for malnutrition  Collaborate with interdisciplinary team and initiate plan and interventions as ordered  Monitor patient's weight and dietary intake as ordered or per policy  Utilize nutrition screening tool and intervene as necessary  Determine patient's food preferences and provide high-protein, high-caloric foods as appropriate       INTERVENTIONS:  - Monitor oral intake, urinary output, labs, and treatment plans  - Assess nutrition and hydration status and recommend course of action  - Evaluate amount of meals eaten  - Assist patient with eating if necessary   - Allow adequate time for meals  - Recommend/ encourage appropriate diets, oral nutritional supplements, and vitamin/mineral supplements  - Order, calculate, and assess calorie counts as needed  - Recommend, monitor, and adjust tube feedings and TPN/PPN based on assessed needs  - Assess need for intravenous fluids  - Provide specific nutrition/hydration education as appropriate  - Include patient/family/caregiver in decisions related to nutrition  9/16/2020 0940 by Toyin Huang RN  Outcome: Progressing  9/16/2020 0939 by Toyin Huang RN  Outcome: Progressing     Problem: Prexisting or High Potential for Compromised Skin Integrity  Goal: Skin integrity is maintained or improved  Description: INTERVENTIONS:  - Identify patients at risk for skin breakdown  - Assess and monitor skin integrity  - Assess and monitor nutrition and hydration status  - Monitor labs   - Assess for incontinence   - Turn and reposition patient  - Assist with mobility/ambulation  - Relieve pressure over bony prominences  - Avoid friction and shearing  - Provide appropriate hygiene as needed including keeping skin clean and dry  - Evaluate need for skin moisturizer/barrier cream  - Collaborate with interdisciplinary team   - Patient/family teaching  - Consider wound care consult   9/16/2020 0940 by Tanja Grubbs RN  Outcome: Progressing  9/16/2020 0939 by Tanja Grubbs RN  Outcome: Progressing

## 2020-09-16 NOTE — ASSESSMENT & PLAN NOTE
Lab Results   Component Value Date    HGBA1C 8 1 (H) 08/28/2020       Recent Labs     09/15/20  1722 09/15/20  2047 09/16/20  0647 09/16/20  1016   POCGLU 176* 211* 130 170*       Blood Sugar Average: Last 72 hrs:  · (P) 179 [id number]       Hemoglobin A1c 8 1  Blood sugars improving  Continue Lantus 30 units subcu HS, Humalog 15 units t i d    Continue SSI  Continue to monitor sugar

## 2020-09-16 NOTE — PROGRESS NOTES
Progress Note - Ned Prajapati 1965, 60 y o  male MRN: [id number]    Unit/Bed#: -01 Encounter: [id number]    Primary Care Provider: Stefano Abdi MD   Date and time admitted to hospital: 8/28/2020  3:14 PM        * Osteomyelitis [facility])  Assessment & Plan  Recent history of right hallux amputation  Presented with wound dehiscence, wound cellulitis, possible osteomyelitis  2/2 blood culture from 08/28/2020 growing MRSA  Right foot MRI report reviewed and noted for osteomyelitis  Status post right-sided TMA    Was switched from vancomycin to  daptomycin given ATN    Currently afebrile, hemodynamically stable  Encourage for strict nonweightbearing to right lower extremity at this time  Continue dressing changes per podiatrist recommendation  Acute kidney injury ([facility]) with acute tubular necrosis (ATN) (HCC)  Assessment & Plan  Likely secondary to sepsis/surgery,+/- urinary retention  Creatinine peaked to 2 32 on 9/5  Baseline appears to be 0 8-0 9  Creatinine currently stable an improving  nephro following recs appreciated    Patient required 3 straight catheterization,Mae inserted per protocol  C/w Flomax   Avoid nephrotoxin and hypotension  Medically stable for discharge  follow-up with Nephrology in the outpatient basis    MRSA bacteremia  Assessment & Plan  2/2 culture from 08/28/2020 growing MRSA  Likely source foot wound  Negative 2D echo  Repeat blood cultures remain negative  Continue IV daptomycin total of 4 weeks through 9/27  Weekly CBC with diff, creatinine, vancomycin level  Outpatient ID follow-up after hospital discharge  Vascular surgery, Podiatry following  ID recommendations appreciated      Urinary retention  Assessment & Plan  Required 3 straight straight catheterization,yielded 500 - 700ml urine  Mae inserted 09/8/20  Will be dc with catheter to rehab   Voiding trial at rehab  Started Flomax  Patient denies constipation  Will follow-up outpatient with Urology    Amputation of right great toe [facility])  Assessment & Plan  Now presented again with poor wound healing, wound dehiscence, possible cellulitis, osteomyelitis  Plan is as stated above under 1  And 2  PAD (peripheral artery disease) [facility])  Assessment & Plan  Patient history of recent right popliteal PTA/ stent 08/06/2020 and right hallux amputation  Now noted with osteomyelitis as evident on right foot MRI report  Continue aspirin, statin, Plavix  Follow-up with vascular surgery recommendation as well as podiatry following  Type 2 diabetes mellitus with diabetic polyneuropathy, with long-term current use of insulin [facility])  Assessment & Plan  Lab Results   Component Value Date    HGBA1C 8 1 (H) 08/28/2020       Recent Labs     09/15/20  1722 09/15/20  2047 09/16/20  0647 09/16/20  1016   POCGLU 176* 211* 130 170*       Blood Sugar Average: Last 72 hrs:  · (P) 179 [id number]       Hemoglobin A1c 8 1  Blood sugars improving  Continue Lantus 30 units subcu HS, Humalog 15 units t i d  Continue SSI  Continue to monitor sugar        Hypertension, essential  Assessment & Plan  · Controlled  · Continue verapamil  Avalide held secondary to acute kidney injury  · Continue to monitor blood pressure closely  · BP stable        VTE Pharmacologic Prophylaxis:   Pharmacologic: Heparin  Mechanical VTE Prophylaxis in Place: Yes    Patient Centered Rounds: I have performed bedside rounds with nursing staff today  Discussions with Specialists or Other Care Team Provider: pam nephrology    Education and Discussions with Family / Patient: patient    Time Spent for Care: 30 minutes  More than 50% of total time spent on counseling and coordination of care as described above      Current Length of Stay: 19 day(s)    Current Patient Status: Inpatient   Certification Statement: The patient will continue to require additional inpatient hospital stay due to placement    Discharge Plan:  Pending placement to rehab    Code Status: Level 1 - Full Code      Subjective:   Pt seen and examined  Denies any complaints at this time  No fevers or chills or chest pain  No leg pain  He wants to go to rehab    Objective:     Vitals:   Temp (24hrs), Av 1 °F (36 7 °C), Min:98 °F (36 7 °C), Max:98 1 °F (36 7 °C)    Temp:  [98 °F (36 7 °C)-98 1 °F (36 7 °C)] 98 °F (36 7 °C)  HR:  [71-84] 84  Resp:  [18] 18  BP: (132-145)/(77-92) 132/92  SpO2:  [95 %] 95 %  Body mass index is 30 89 kg/m²  Input and Output Summary (last 24 hours): Intake/Output Summary (Last 24 hours) at 2020 1156  Last data filed at 2020 0854  Gross per 24 hour   Intake 1260 ml   Output 2250 ml   Net -990 ml       Physical Exam:     Physical Exam  Vitals signs and nursing note reviewed  Constitutional:       Appearance: Normal appearance  HENT:      Head: Normocephalic and atraumatic  Eyes:      Extraocular Movements: Extraocular movements intact  Pupils: Pupils are equal, round, and reactive to light  Neck:      Musculoskeletal: Normal range of motion  Cardiovascular:      Rate and Rhythm: Normal rate and regular rhythm  Pulses: Normal pulses  Heart sounds: Normal heart sounds  Pulmonary:      Effort: Pulmonary effort is normal  No respiratory distress  Breath sounds: Normal breath sounds  Abdominal:      General: Abdomen is flat  Palpations: Abdomen is soft  Musculoskeletal: Normal range of motion  Skin:     General: Skin is warm and dry  Neurological:      General: No focal deficit present  Mental Status: He is alert and oriented to person, place, and time           Additional Data:     Labs:    Results from last 7 days   Lab Units 20  0512   WBC Thousand/uL 10 94*   HEMOGLOBIN g/dL 9 6*   HEMATOCRIT % 29 4*   PLATELETS Thousands/uL 398*   NEUTROS PCT % 60   LYMPHS PCT % 24   MONOS PCT % 10   EOS PCT % 5     Results from last 7 days   Lab Units 20  0512   SODIUM mmol/L 137 POTASSIUM mmol/L 3 5   CHLORIDE mmol/L 101   CO2 mmol/L 28   BUN mg/dL 32*   CREATININE mg/dL 1 96*   ANION GAP mmol/L 8   CALCIUM mg/dL 8 9   GLUCOSE RANDOM mg/dL 143*         Results from last 7 days   Lab Units 09/16/20  1016 09/16/20  0647 09/15/20  2047 09/15/20  1722 09/15/20  1036 09/15/20  0717 09/14/20  2109 09/14/20  1506 09/14/20  1052 09/14/20  0744 09/13/20  2120 09/13/20  1545   POC GLUCOSE mg/dl 170* 130 211* 176* 264* 161* 279* 290* 165* 124 167* 76                   * I Have Reviewed All Lab Data Listed Above  * Additional Pertinent Lab Tests Reviewed:  Pradeep 66 Admission Reviewed    Imaging:    Imaging Reports Reviewed Today Include:   Imaging Personally Reviewed by Myself Includes:     Recent Cultures (last 7 days):           Last 24 Hours Medication List:   Current Facility-Administered Medications   Medication Dose Route Frequency Provider Last Rate    acetaminophen  650 mg Oral Q6H PRN Janee Kocher, PA-C      aluminum-magnesium hydroxide-simethicone  30 mL Oral Q6H PRN Carissa Alonzo DPM      aspirin  81 mg Oral Daily Destin Ferrer Ma      atorvastatin  40 mg Oral Daily With HERMAN Coffman      clopidogrel  75 mg Oral Daily Destin Ferrer Ma      DAPTOmycin  6 mg/kg (Adjusted) Intravenous Q24H Fiona Aldea,  mg (09/15/20 1245)    docusate sodium  100 mg Oral BID Gracie Marie MD      heparin (porcine)  5,000 Units Subcutaneous Q8H [facility] NAILA Suarez      insulin glargine  30 Units Subcutaneous HS Lalo Thornton MD      insulin lispro  1-5 Units Subcutaneous 4x Daily (AC & HS) Carissa Alonzo DPM      insulin lispro  15 Units Subcutaneous TID With Meals Carissa Alonzo DPM      LORazepam  0 5 mg Oral BID PRN Carissa Alonzo DPM      methimazole  5 mg Oral Daily Destin Ferrer Ma      neomycin-bacitracin-polymyxin b  1 small application Topical BID NAILA Bach      ondansetron  4 mg Intravenous Q6H PRN Carissa Alonzo DPM  oxyCODONE  10 mg Oral Q6H PRN Bing Pulido MD      oxyCODONE  5 mg Oral Q6H PRN Bing Pulido MD      pantoprazole  20 mg Oral Early Morning Arlene Bear DPM      polyethylene glycol  17 g Oral Daily PRN Arlene Bear DPM      tamsulosin  0 4 mg Oral Daily With Yan Morse MD      verapamil  240 mg Oral Daily Destin Watson      zolpidem  10 mg Oral HS PRN Arlene Bear DPM          Today, Patient Was Seen By: Bing Pulido MD    ** Please Note: Dictation voice to text software may have been used in the creation of this document   **

## 2020-09-16 NOTE — PLAN OF CARE
Problem: Potential for Falls  Goal: Patient will remain free of falls  Description: INTERVENTIONS:  - Assess patient frequently for physical needs  -  Identify cognitive and physical deficits and behaviors that affect risk of falls    -  [place] fall precautions as indicated by assessment   - Educate patient/family on patient safety including physical limitations  - Instruct patient to call for assistance with activity based on assessment  - Modify environment to reduce risk of injury  - Consider OT/PT consult to assist with strengthening/mobility  Outcome: Progressing     Problem: PAIN - ADULT  Goal: Verbalizes/displays adequate comfort level or baseline comfort level  Description: Interventions:  - Encourage patient to monitor pain and request assistance  - Assess pain using appropriate pain scale  - Administer analgesics based on type and severity of pain and evaluate response  - Implement non-pharmacological measures as appropriate and evaluate response  - Consider cultural and social influences on pain and pain management  - Notify physician/advanced practitioner if interventions unsuccessful or patient reports new pain  Outcome: Progressing     Problem: INFECTION - ADULT  Goal: Absence or prevention of progression during hospitalization  Description: INTERVENTIONS:  - Assess and monitor for signs and symptoms of infection  - Monitor lab/diagnostic results  - Monitor all insertion sites, i e  indwelling lines, tubes, and drains  - Monitor endotracheal if appropriate and nasal secretions for changes in amount and color  - [place] appropriate cooling/warming therapies per order  - Administer medications as ordered  - Instruct and encourage patient and family to use good hand hygiene technique  - Identify and instruct in appropriate isolation precautions for identified infection/condition  Outcome: Progressing  Goal: Absence of fever/infection during neutropenic period  Description: INTERVENTIONS:  - Monitor WBC    Outcome: Progressing     Problem: SAFETY ADULT  Goal: Patient will remain free of falls  Description: INTERVENTIONS:  - Assess patient frequently for physical needs  -  Identify cognitive and physical deficits and behaviors that affect risk of falls    -  [place] fall precautions as indicated by assessment   - Educate patient/family on patient safety including physical limitations  - Instruct patient to call for assistance with activity based on assessment  - Modify environment to reduce risk of injury  - Consider OT/PT consult to assist with strengthening/mobility  Outcome: Progressing  Goal: Maintain or return to baseline ADL function  Description: INTERVENTIONS:  -  Assess patient's ability to carry out ADLs; assess patient's baseline for ADL function and identify physical deficits which impact ability to perform ADLs (bathing, care of mouth/teeth, toileting, grooming, dressing, etc )  - Assess/evaluate cause of self-care deficits   - Assess range of motion  - Assess patient's mobility; develop plan if impaired  - Assess patient's need for assistive devices and provide as appropriate  - Encourage maximum independence but intervene and supervise when necessary  - Involve family in performance of ADLs  - Assess for home care needs following discharge   - Consider OT consult to assist with ADL evaluation and planning for discharge  - Provide patient education as appropriate  Outcome: Progressing  Goal: Maintain or return mobility status to optimal level  Description: INTERVENTIONS:  - Assess patient's baseline mobility status (ambulation, transfers, stairs, etc )    - Identify cognitive and physical deficits and behaviors that affect mobility  - Identify mobility aids required to assist with transfers and/or ambulation (gait belt, sit-to-stand, lift, walker, cane, etc )  - [place] fall precautions as indicated by assessment  - Record patient progress and toleration of activity level on Mobility SBAR; progress patient to next Phase/Stage  - Instruct patient to call for assistance with activity based on assessment  - Consider rehabilitation consult to assist with strengthening/weightbearing, etc   Outcome: Progressing     Problem: DISCHARGE PLANNING  Goal: Discharge to home or other facility with appropriate resources  Description: INTERVENTIONS:  - Identify barriers to discharge w/patient and caregiver  - Arrange for needed discharge resources and transportation as appropriate  - Identify discharge learning needs (meds, wound care, etc )  - Arrange for interpretive services to assist at discharge as needed  - Refer to Case Management Department for coordinating discharge planning if the patient needs post-hospital services based on physician/advanced practitioner order or complex needs related to functional status, cognitive ability, or social support system  Outcome: Progressing     Problem: Knowledge Deficit  Goal: Patient/family/caregiver demonstrates understanding of disease process, treatment plan, medications, and discharge instructions  Description: Complete learning assessment and assess knowledge base  Interventions:  - Provide teaching at level of understanding  - Provide teaching via preferred learning methods  Outcome: Progressing     Problem: Nutrition/Hydration-ADULT  Goal: Nutrient/Hydration intake appropriate for improving, restoring or maintaining nutritional needs  Description: Monitor and assess patient's nutrition/hydration status for malnutrition  Collaborate with interdisciplinary team and initiate plan and interventions as ordered  Monitor patient's weight and dietary intake as ordered or per policy  Utilize nutrition screening tool and intervene as necessary  Determine patient's food preferences and provide high-protein, high-caloric foods as appropriate       INTERVENTIONS:  - Monitor oral intake, urinary output, labs, and treatment plans  - Assess nutrition and hydration status and recommend course of action  - Evaluate amount of meals eaten  - Assist patient with eating if necessary   - Allow adequate time for meals  - Recommend/ encourage appropriate diets, oral nutritional supplements, and vitamin/mineral supplements  - Order, calculate, and assess calorie counts as needed  - Recommend, monitor, and adjust tube feedings and TPN/PPN based on assessed needs  - Assess need for intravenous fluids  - Provide specific nutrition/hydration education as appropriate  - Include patient/family/caregiver in decisions related to nutrition  Outcome: Progressing     Problem: Prexisting or High Potential for Compromised Skin Integrity  Goal: Skin integrity is maintained or improved  Description: INTERVENTIONS:  - Identify patients at risk for skin breakdown  - Assess and monitor skin integrity  - Assess and monitor nutrition and hydration status  - Monitor labs   - Assess for incontinence   - Turn and reposition patient  - Assist with mobility/ambulation  - Relieve pressure over bony prominences  - Avoid friction and shearing  - Provide appropriate hygiene as needed including keeping skin clean and dry  - Evaluate need for skin moisturizer/barrier cream  - Collaborate with interdisciplinary team   - Patient/family teaching  - Consider wound care consult   Outcome: Progressing

## 2020-09-16 NOTE — PROGRESS NOTES
NEPHROLOGY PROGRESS NOTE    Patient: Kaylin Arroyo               Sex: male          DOA: 8/28/2020  3:14 PM   YOB: 1965        Age:  60 y o         LOS:  LOS: 19 days       HPI     Patient with acute kidney injury    SUBJECTIVE     Overall doing well    No new development    Still waiting for rehab place a problem    CURRENT MEDICATIONS       Current Facility-Administered Medications:     acetaminophen (TYLENOL) tablet 650 mg, 650 mg, Oral, Q6H PRN, Girma Ty PA-C, 650 mg at 09/14/20 2305    aluminum-magnesium hydroxide-simethicone (MYLANTA) 200-200-20 mg/5 mL oral suspension 30 mL, 30 mL, Oral, Q6H PRN, Genevieve Furrow, DPM    aspirin chewable tablet 81 mg, 81 mg, Oral, Daily, [place] Furrow, DPM, 81 mg at 09/16/20 0801    atorvastatin (LIPITOR) tablet 40 mg, 40 mg, Oral, Daily With Nick Huang, DPM, 40 mg at 09/15/20 1743    clopidogrel (PLAVIX) tablet 75 mg, 75 mg, Oral, Daily, Genevieve Furrow, DPM, 75 mg at 09/16/20 0801    DAPTOmycin (CUBICIN) 600 mg in sodium chloride 0 9 % 50 mL IVPB, 6 mg/kg (Adjusted), Intravenous, Q24H, Fiona Cortes DO, Last Rate: 100 mL/hr at 09/15/20 1245, 600 mg at 09/15/20 1245    docusate sodium (COLACE) capsule 100 mg, 100 mg, Oral, BID, Melo Castro MD, 100 mg at 09/16/20 0802    heparin (porcine) subcutaneous injection 5,000 Units, 5,000 Units, Subcutaneous, Q8H [address], ReniNAILA Jean, 5,000 Units at 09/16/20 0505    insulin glargine (LANTUS) subcutaneous injection 30 Units 0 3 mL, 30 Units, Subcutaneous, HS, Melo Castro MD, 30 Units at 09/15/20 2148    insulin lispro (HumaLOG) 100 units/mL subcutaneous injection 1-5 Units, 1-5 Units, Subcutaneous, 4x Daily (AC & HS), 2 Units at 09/15/20 2148 **AND** Fingerstick Glucose (POCT), , , 4x Daily AC and at bedtime, Genevieve Salinas DPMIKE    insulin lispro (HumaLOG) 100 units/mL subcutaneous injection 15 Units, 15 Units, Subcutaneous, TID With Meals, Genevieve Salinas DPM, 15 Units at 09/16/20 0802    LORazepam (ATIVAN) tablet 0 5 mg, 0 5 mg, Oral, BID PRN, Ania Read, HERMAN    methimazole (TAPAZOLE) tablet 5 mg, 5 mg, Oral, Daily, Ania Read DPM, 5 mg at 09/16/20 0801    neomycin-bacitracin-polymyxin b (NEOSPORIN) ointment 1 small application, 1 small application, Topical, BID, NAILA Gaming, 1 small application at 96/58/57 0802    ondansetron (ZOFRAN) injection 4 mg, 4 mg, Intravenous, Q6H PRN, Ania Read DPM, 4 mg at 09/03/20 1848    oxyCODONE (ROXICODONE) immediate release tablet 10 mg, 10 mg, Oral, Q6H PRN, Sunni Lainez MD, 10 mg at 09/03/20 1432    oxyCODONE (ROXICODONE) IR tablet 5 mg, 5 mg, Oral, Q6H PRN, Sunni Lainez MD    pantoprazole (PROTONIX) EC tablet 20 mg, 20 mg, Oral, Early Morning, Ania Read DPM, 20 mg at 09/16/20 0505    polyethylene glycol (MIRALAX) packet 17 g, 17 g, Oral, Daily PRN, Ania Read DPM    [facility]) capsule 0 4 mg, 0 4 mg, Oral, Daily With Alphonse Noguera MD, 0 4 mg at 09/15/20 1743    verapamil (CALAN-SR) CR tablet 240 mg, 240 mg, Oral, Daily, Ania Read DPM, 240 mg at 09/16/20 0801    zolpidem (AMBIEN) tablet 10 mg, 10 mg, Oral, HS PRN, Ania Read, DPM, 10 mg at 08/31/20 2322    OBJECTIVE     Current Weight: Weight - Scale: 115 kg (253 lb 12 oz)  Vitals:    09/16/20 0800   BP: 132/92   Pulse: 84   Resp: 18   Temp: 98 °F (36 7 °C)   SpO2:        Intake/Output Summary (Last 24 hours) at 9/16/2020 1059  Last data filed at 9/16/2020 0854  Gross per 24 hour   Intake 1260 ml   Output 2250 ml   Net -990 ml       PHYSICAL EXAMINATION     Physical Exam  Constitutional:       General: He is not in acute distress  Appearance: He is well-developed  HENT:      Head: Normocephalic  Eyes:      General: No scleral icterus  Conjunctiva/sclera: Conjunctivae normal    Neck:      Musculoskeletal: Neck supple  Vascular: No JVD  Cardiovascular:      Rate and Rhythm: Normal rate        Heart sounds: Normal heart sounds  Pulmonary:      Effort: Pulmonary effort is normal       Breath sounds: No wheezing  Abdominal:      Palpations: Abdomen is soft  Tenderness: There is no abdominal tenderness  Musculoskeletal: Normal range of motion  Skin:     General: Skin is warm  Findings: No rash  Neurological:      Mental Status: He is alert and oriented to person, place, and time  Psychiatric:         Behavior: Behavior normal           LAB RESULTS     Results from last 7 days   Lab Units 09/16/20  0512 09/15/20  0630 09/14/20  0437 09/13/20  0642 09/12/20  0759 09/11/20  0448 09/10/20  0522   WBC Thousand/uL 10 94* 10 46* 10 62* 11 12* 11 64* 12 68* 11 86*   HEMOGLOBIN g/dL 9 6* 9 5* 9 2* 9 6* 9 6* 9 3* 9 4*   HEMATOCRIT % 29 4* 28 9* 27 7* 29 0* 28 9* 27 9* 28 2*   PLATELETS Thousands/uL 398* 380 379 360 361 337 334   POTASSIUM mmol/L 3 5 3 5 3 3* 3 5 3 5 3 4* 3 5   CHLORIDE mmol/L 101 101 102 99* 101 100 103   CO2 mmol/L 28 28 27 28 25 26 26   BUN mg/dL 32* 32* 30* 29* 30* 31* 27*   CREATININE mg/dL 1 96* 1 99* 2 07* 2 00* 2 14* 2 24* 2 14*   EGFR ml/min/1 73sq m 37 36 35 36 33 31 33   CALCIUM mg/dL 8 9 8 9 8 7 9 1 9 0 8 5 8 6   MAGNESIUM mg/dL  --   --   --   --   --   --  1 8       RADIOLOGY RESULTS      No results found for this or any previous visit  Results for orders placed during the hospital encounter of 11/15/18   XR chest 2 views    Narrative CHEST     INDICATION:   n/v, dizziness  COMPARISON:  None    EXAM PERFORMED/VIEWS:  XR CHEST PA & LATERAL      FINDINGS:    Cardiac silhouette is enlarged  Aortic calcification is present  Mildly ectatic ascending aorta  No airspace consolidation, pneumothorax, pulmonary edema, or pleural effusion  Osseous structures appear within normal limits for patient age  Impression No radiographic evidence of acute intrathoracic process  Workstation performed: [id number]       No results found for this or any previous visit    No results found for this or any previous visit  No results found for this or any previous visit  No results found for this or any previous visit  PLAN / RECOMMENDATIONS      Acute kidney injury:  Seems to be stable at creatinine 1 9  Will require continues monitoring  Likely etiology is vanc with lose ATN    Osteomyelitis:  On daptomycin which will continue    Disposition:  Can be discharged renal point of view of will require outpatient monitoring by nephrologist    Ivy Lucio MD  Nephrology  9/16/2020        Portions of the record may have been created with voice recognition software  Occasional wrong word or "sound a like" substitutions may have occurred due to the inherent limitations of voice recognition software  Read the chart carefully and recognize, using context, where substitutions have occurred

## 2020-09-16 NOTE — PLAN OF CARE
Problem: Potential for Falls  Goal: Patient will remain free of falls  Description: INTERVENTIONS:  - Assess patient frequently for physical needs  -  Identify cognitive and physical deficits and behaviors that affect risk of falls    -  [place] fall precautions as indicated by assessment   - Educate patient/family on patient safety including physical limitations  - Instruct patient to call for assistance with activity based on assessment  - Modify environment to reduce risk of injury  - Consider OT/PT consult to assist with strengthening/mobility  Outcome: Progressing     Problem: PAIN - ADULT  Goal: Verbalizes/displays adequate comfort level or baseline comfort level  Description: Interventions:  - Encourage patient to monitor pain and request assistance  - Assess pain using appropriate pain scale  - Administer analgesics based on type and severity of pain and evaluate response  - Implement non-pharmacological measures as appropriate and evaluate response  - Consider cultural and social influences on pain and pain management  - Notify physician/advanced practitioner if interventions unsuccessful or patient reports new pain  Outcome: Progressing     Problem: INFECTION - ADULT  Goal: Absence or prevention of progression during hospitalization  Description: INTERVENTIONS:  - Assess and monitor for signs and symptoms of infection  - Monitor lab/diagnostic results  - Monitor all insertion sites, i e  indwelling lines, tubes, and drains  - Monitor endotracheal if appropriate and nasal secretions for changes in amount and color  - [place] appropriate cooling/warming therapies per order  - Administer medications as ordered  - Instruct and encourage patient and family to use good hand hygiene technique  - Identify and instruct in appropriate isolation precautions for identified infection/condition  Outcome: Progressing  Goal: Absence of fever/infection during neutropenic period  Description: INTERVENTIONS:  - Monitor WBC    Outcome: Progressing     Problem: SAFETY ADULT  Goal: Patient will remain free of falls  Description: INTERVENTIONS:  - Assess patient frequently for physical needs  -  Identify cognitive and physical deficits and behaviors that affect risk of falls    -  [place] fall precautions as indicated by assessment   - Educate patient/family on patient safety including physical limitations  - Instruct patient to call for assistance with activity based on assessment  - Modify environment to reduce risk of injury  - Consider OT/PT consult to assist with strengthening/mobility  Outcome: Progressing  Goal: Maintain or return to baseline ADL function  Description: INTERVENTIONS:  -  Assess patient's ability to carry out ADLs; assess patient's baseline for ADL function and identify physical deficits which impact ability to perform ADLs (bathing, care of mouth/teeth, toileting, grooming, dressing, etc )  - Assess/evaluate cause of self-care deficits   - Assess range of motion  - Assess patient's mobility; develop plan if impaired  - Assess patient's need for assistive devices and provide as appropriate  - Encourage maximum independence but intervene and supervise when necessary  - Involve family in performance of ADLs  - Assess for home care needs following discharge   - Consider OT consult to assist with ADL evaluation and planning for discharge  - Provide patient education as appropriate  Outcome: Progressing  Goal: Maintain or return mobility status to optimal level  Description: INTERVENTIONS:  - Assess patient's baseline mobility status (ambulation, transfers, stairs, etc )    - Identify cognitive and physical deficits and behaviors that affect mobility  - Identify mobility aids required to assist with transfers and/or ambulation (gait belt, sit-to-stand, lift, walker, cane, etc )  - [place] fall precautions as indicated by assessment  - Record patient progress and toleration of activity level on Mobility SBAR; progress patient to next Phase/Stage  - Instruct patient to call for assistance with activity based on assessment  - Consider rehabilitation consult to assist with strengthening/weightbearing, etc   Outcome: Progressing     Problem: DISCHARGE PLANNING  Goal: Discharge to home or other facility with appropriate resources  Description: INTERVENTIONS:  - Identify barriers to discharge w/patient and caregiver  - Arrange for needed discharge resources and transportation as appropriate  - Identify discharge learning needs (meds, wound care, etc )  - Arrange for interpretive services to assist at discharge as needed  - Refer to Case Management Department for coordinating discharge planning if the patient needs post-hospital services based on physician/advanced practitioner order or complex needs related to functional status, cognitive ability, or social support system  Outcome: Progressing     Problem: Knowledge Deficit  Goal: Patient/family/caregiver demonstrates understanding of disease process, treatment plan, medications, and discharge instructions  Description: Complete learning assessment and assess knowledge base  Interventions:  - Provide teaching at level of understanding  - Provide teaching via preferred learning methods  Outcome: Progressing     Problem: Nutrition/Hydration-ADULT  Goal: Nutrient/Hydration intake appropriate for improving, restoring or maintaining nutritional needs  Description: Monitor and assess patient's nutrition/hydration status for malnutrition  Collaborate with interdisciplinary team and initiate plan and interventions as ordered  Monitor patient's weight and dietary intake as ordered or per policy  Utilize nutrition screening tool and intervene as necessary  Determine patient's food preferences and provide high-protein, high-caloric foods as appropriate       INTERVENTIONS:  - Monitor oral intake, urinary output, labs, and treatment plans  - Assess nutrition and hydration status and recommend course of action  - Evaluate amount of meals eaten  - Assist patient with eating if necessary   - Allow adequate time for meals  - Recommend/ encourage appropriate diets, oral nutritional supplements, and vitamin/mineral supplements  - Order, calculate, and assess calorie counts as needed  - Recommend, monitor, and adjust tube feedings and TPN/PPN based on assessed needs  - Assess need for intravenous fluids  - Provide specific nutrition/hydration education as appropriate  - Include patient/family/caregiver in decisions related to nutrition  Outcome: Progressing     Problem: Prexisting or High Potential for Compromised Skin Integrity  Goal: Skin integrity is maintained or improved  Description: INTERVENTIONS:  - Identify patients at risk for skin breakdown  - Assess and monitor skin integrity  - Assess and monitor nutrition and hydration status  - Monitor labs   - Assess for incontinence   - Turn and reposition patient  - Assist with mobility/ambulation  - Relieve pressure over bony prominences  - Avoid friction and shearing  - Provide appropriate hygiene as needed including keeping skin clean and dry  - Evaluate need for skin moisturizer/barrier cream  - Collaborate with interdisciplinary team   - Patient/family teaching  - Consider wound care consult   Outcome: Progressing

## 2020-09-16 NOTE — CASE MANAGEMENT
CM provided Carlyle Jordan ay [facility] rehab with updated PT/OT notes and indicated that the pt did not need any hyperbaeric treatment while in care and informed her that the pt has a podiatrist who will follow him while in the rehab  Carlyle Jordan reviewed the clinicals and responded by stating that she believed the pt needs exceeded what they were able to provide, they did not believe there was an adequate dc plan and she believes the pt needs would be best served in a SNF setting    CM had spoken with the pt about alternative acute rehabs within the network and also included [facility]  The pt was agreeable to the additional referrals but his preference was [facility] because he said they allow overnight visits and his dtr would have the option of staying over  CM explained to the pt that he was not approved for [facility]  CM reviewed all scripts responses and informed him that he has been accepted into a  [facility] and provided him with the contact information and location [facility] in [place]      The pt was agreeable and the CM further explained that the auth is pending     CM will follow up on the auth and the pt can potentially dc tomorrow

## 2020-09-16 NOTE — PROGRESS NOTES
Progress Note - Infectious Disease   Carolina Weaver 60 y o  male MRN: [id number]  Unit/Bed#: -01 Encounter: [id number]      A/P:  1  MRSA bacteremia   Secondary to #2/3   Negative 2D echo   No intravascular prosthetic devices   Repeat blood culture are negative  Antibiotic was changed to daptomycin due to elevated creatinine, supratherapeutic levels      -continue IV daptomycin 600 mg Q 24 hours based on renal function  -plan for 4 week course of IV antibiotics from negative blood culture, through 9/27   -check weekly CBC with diff, creatinine, CPK  Next CPK on 09/19   -outpatient ID follow-up after hospital discharge  -discontinue central line after completion of IV antibiotic  -check surveillance blood cultures 2 weeks after completion of antibiotic     2  Right foot cellulitis   Suspect secondary to MRSA   Wound culture also shows Morganella, Enterobacter, Enterococcus, which is not unexpected finding in the setting of chronic ulceration   Would continue targeted therapy for MRSA infection   Now status post TMA     -antibiotic plan as above  -serial foot exams     3  Right foot nonhealing ulceration   With residual 1st and 2nd toe osteomyelitis   Podiatry input noted  Now s/p TMA on 09/03 with presumed surgical cure of osteomyelitis      -antibiotic plan as above  -daily local wound care and dressing changes  -outpatient podiatry follow-up     4  Status post 1st digit amputation on 08/11/2020   Complicated by above      5  Acute kidney injury   Nephrology input noted with lower suspicion for vancomycin toxicity   Consider secondary to urinary retention   Now status post Mae catheter placement   No acute findings on renal ultrasound  Antibiotic was changed to daptomycin, as above, as vancomycin induced toxicity could not be ruled out    Creatinine continues to slowly trend down      -antibiotic as above  -management of Mae catheter per Urology  -nephrology follow-up ongoing  -monitor BMP closely     6  PAD   Status post recent revascularization   Vascular surgery input noted      7  Uncontrolled diabetes mellitus   Risk factor for severe infection      8  Leukocytosis  Likely reactive in the immediate postoperative setting   Remains afebrile   Blood cultures showed clearance of bacteremia   WBC count remains stable      -antibiotic plan as above  -monitor CBC  -monitor temperatures     Daptomycin 6  POD10    Pending placement to rehab  Stable for discharge to rehab from ID standpoint          Subjective:  Doing well  Denies fevers, chills, or sweats  Denies nausea, vomiting, or diarrhea  Awaiting rehab  Objective:  Vitals:  Temp:  [98 °F (36 7 °C)-98 1 °F (36 7 °C)] 98 °F (36 7 °C)  HR:  [71-84] 84  Resp:  [18] 18  BP: (132-145)/(77-92) 132/92  SpO2:  [95 %] 95 %  Temp (24hrs), Av 1 °F (36 7 °C), Min:98 °F (36 7 °C), Max:98 1 °F (36 7 °C)  Current: Temperature: 98 °F (36 7 °C)    Physical Exam:   General:  No acute distress  Psychiatric:  Awake and alert  Pulmonary:  Normal respiratory excursion without accessory muscle use  Abdomen:  Soft, nontender  Mae in place with clear yellow urine  Extremities:  No edema  Skin:  No rashes  Foot dressing intact    Lab Results:  I have personally reviewed pertinent labs  Results from last 7 days   Lab Units 09/16/20  0512 09/15/20  0630 09/14/20  0437   POTASSIUM mmol/L 3 5 3 5 3 3*   CHLORIDE mmol/L 101 101 102   CO2 mmol/L 28 28 27   BUN mg/dL 32* 32* 30*   CREATININE mg/dL 1 96* 1 99* 2 07*   EGFR ml/min/1 73sq m 37 36 35   CALCIUM mg/dL 8 9 8 9 8 7     Results from last 7 days   Lab Units 09/16/20  0512 09/15/20  0630 09/14/20  0437   WBC Thousand/uL 10 94* 10 46* 10 62*   HEMOGLOBIN g/dL 9 6* 9 5* 9 2*   PLATELETS Thousands/uL 398* 380 379           Imaging Studies:   I have personally reviewed pertinent imaging study reports and images in PACS  EKG, Pathology, and Other Studies:   I have personally reviewed pertinent reports

## 2020-09-16 NOTE — OCCUPATIONAL THERAPY NOTE
Occupational Therapy Treatment Note        Patient Name: Ned Prajapati  YSPIN'H Date: 9/16/2020 09/16/20 1115   Restrictions/Precautions   Weight Bearing Precautions Per Order Yes   RLE Weight Bearing Per Order NWB   Other Precautions Fall Risk;Multiple lines;Pain;WBS   Pain Assessment   Pain Assessment Tool 0-10   Pain Score No Pain  (Pt reported increased pain with catheter upon supine to sit)   ADL   Where Assessed Edge of bed   Eating Assistance 5  Supervision/Setup   Eating Deficit Setup   Grooming Assistance 5  Supervision/Setup   Grooming Deficit Setup; Increased time to complete   UB Bathing Assistance 5  Supervision/Setup   UB Bathing Comments Based on simulated ADL, as patient stated that he already had washed up this AM   LB Bathing Assistance 5  Supervision/Setup   LB Bathing Comments Based on simulated ADL, as patient stated that he already had washed up this AM    [address] 5  Supervision/Setup   LB Dressing Assistance 4  Minimal Assistance   LB Dressing Deficit Setup;Supervision/safety; Increased time to complete   LB Dressing Comments Patient stated "I could get the pants over my hips, but I don't want to right now"  Patient was able to thread his pants through his LLE and RLE at supervision/setup assist level    [address]; Bedside commode   Bed Mobility   Supine to Sit 5  Supervision   Additional items Assist x 1;Verbal cues   Sit to Supine 5  Supervision   Additional items Assist x 1;Verbal cues   Additional Comments Pt received lying supine in bed upon OT arrival; at end of session: pt lying supine in bed w/ all needs within reach   Transfers   Sit to Stand Unable to assess   Additional Comments Patient refused standing trial   Cognition   Overall Cognitive Status [facility]   Arousal/Participation Alert; Responsive; Cooperative   Attention Within functional limits   Orientation Level Oriented X4   Memory Within functional limits   Following Commands Follows all commands and directions without difficulty   Comments Pt agreeable to OT treatment session   Activity Tolerance   Activity Tolerance Patient limited by pain   Medical Staff Made Aware BONI Plata confirmed pt appropriate for therapy and made aware of session outcomes   Assessment   Assessment Patient participated in Skilled OT session this date with interventions consisting of ADL re training with the use of correct body mechnaics, safety awareness and fall prevention techniques,  therapeutic activities to: increase activity tolerance and increase dynamic sit/ stand balance during functional activity    Patient agreeable to OT treatment session, upon arrival patient was found supine in bed, alert, responsive  and in no apparent distress  In comparison to previous session, patient with improvements in activity tolerance, task engagement, and ADL participation/performance  Patient participated in ADLs and simulated ADLs at EOB, please see above flowsheet for ADL assist levels  Patient demonstrated good static and dynamic sitting balance while seated EOB  Patient requiring verbal cues for correct technique and ocassional safety reminders  Patient continues to be functioning below baseline level, occupational performance remains limited secondary to factors listed above and increased risk for falls and injury  From OT standpoint, recommendation at time of d/c would be post-acute rehabilitation services  Patient to benefit from continued Occupational Therapy treatment while in the hospital to address deficits as defined above and maximize level of functional independence with ADLs and functional mobility  Plan   Treatment Interventions ADL retraining; Activityengagement; Energy conservation;Continued evaluation;Patient/family training; Endurance training   Goal Expiration Date 09/18/20   OT Frequency 3-5x/wk   Recommendation   OT Discharge Recommendation Post-Acute Rehabilitation Services   Modified McLean Scale   Modified Nirali Scale 4     Leafy Custard, OTR/L

## 2020-09-17 LAB
ANION GAP SERPL CALCULATED.3IONS-SCNC: 8 MMOL/L (ref 4–13)
BASOPHILS # BLD AUTO: 0.1 THOUSANDS/ΜL (ref 0–0.1)
BASOPHILS NFR BLD AUTO: 1 % (ref 0–1)
BUN SERPL-MCNC: 31 MG/DL (ref 5–25)
CALCIUM SERPL-MCNC: 8.9 MG/DL (ref 8.3–10.1)
CHLORIDE SERPL-SCNC: 102 MMOL/L (ref 100–108)
CO2 SERPL-SCNC: 28 MMOL/L (ref 21–32)
CREAT SERPL-MCNC: 1.88 MG/DL (ref 0.6–1.3)
EOSINOPHIL # BLD AUTO: 0.49 THOUSAND/ΜL (ref 0–0.61)
EOSINOPHIL NFR BLD AUTO: 5 % (ref 0–6)
ERYTHROCYTE [DISTWIDTH] IN BLOOD BY AUTOMATED COUNT: 12.3 % (ref 11.6–15.1)
GFR SERPL CREATININE-BSD FRML MDRD: 39 ML/MIN/1.73SQ M
GLUCOSE SERPL-MCNC: 150 MG/DL (ref 65–140)
GLUCOSE SERPL-MCNC: 151 MG/DL (ref 65–140)
GLUCOSE SERPL-MCNC: 156 MG/DL (ref 65–140)
GLUCOSE SERPL-MCNC: 157 MG/DL (ref 65–140)
GLUCOSE SERPL-MCNC: 262 MG/DL (ref 65–140)
HCT VFR BLD AUTO: 29.1 % (ref 36.5–49.3)
HGB BLD-MCNC: 9.7 G/DL (ref 12–17)
IMM GRANULOCYTES # BLD AUTO: 0.05 THOUSAND/UL (ref 0–0.2)
IMM GRANULOCYTES NFR BLD AUTO: 1 % (ref 0–2)
LYMPHOCYTES # BLD AUTO: 2.58 THOUSANDS/ΜL (ref 0.6–4.47)
LYMPHOCYTES NFR BLD AUTO: 24 % (ref 14–44)
MCH RBC QN AUTO: 29.8 PG (ref 26.8–34.3)
MCHC RBC AUTO-ENTMCNC: 33.3 G/DL (ref 31.4–37.4)
MCV RBC AUTO: 90 FL (ref 82–98)
MONOCYTES # BLD AUTO: 1.02 THOUSAND/ΜL (ref 0.17–1.22)
MONOCYTES NFR BLD AUTO: 9 % (ref 4–12)
NEUTROPHILS # BLD AUTO: 6.76 THOUSANDS/ΜL (ref 1.85–7.62)
NEUTS SEG NFR BLD AUTO: 60 % (ref 43–75)
NRBC BLD AUTO-RTO: 0 /100 WBCS
PLATELET # BLD AUTO: 426 THOUSANDS/UL (ref 149–390)
PMV BLD AUTO: 10.5 FL (ref 8.9–12.7)
POTASSIUM SERPL-SCNC: 3.6 MMOL/L (ref 3.5–5.3)
RBC # BLD AUTO: 3.25 MILLION/UL (ref 3.88–5.62)
SODIUM SERPL-SCNC: 138 MMOL/L (ref 136–145)
WBC # BLD AUTO: 11 THOUSAND/UL (ref 4.31–10.16)

## 2020-09-17 PROCEDURE — 99232 SBSQ HOSP IP/OBS MODERATE 35: CPT | Performed by: INTERNAL MEDICINE

## 2020-09-17 PROCEDURE — 82948 REAGENT STRIP/BLOOD GLUCOSE: CPT

## 2020-09-17 PROCEDURE — 80048 BASIC METABOLIC PNL TOTAL CA: CPT | Performed by: INTERNAL MEDICINE

## 2020-09-17 PROCEDURE — 85025 COMPLETE CBC W/AUTO DIFF WBC: CPT | Performed by: INTERNAL MEDICINE

## 2020-09-17 RX ADMIN — METHIMAZOLE 5 MG: 5 TABLET ORAL at 08:23

## 2020-09-17 RX ADMIN — HEPARIN SODIUM 5000 UNITS: 5000 INJECTION INTRAVENOUS; SUBCUTANEOUS at 05:48

## 2020-09-17 RX ADMIN — TAMSULOSIN HYDROCHLORIDE 0.4 MG: 0.4 CAPSULE ORAL at 17:19

## 2020-09-17 RX ADMIN — BACITRACIN, NEOMYCIN, POLYMYXIN B 1 SMALL APPLICATION: 400; 3.5; 5 OINTMENT TOPICAL at 08:23

## 2020-09-17 RX ADMIN — INSULIN LISPRO 2 UNITS: 100 INJECTION, SOLUTION INTRAVENOUS; SUBCUTANEOUS at 11:34

## 2020-09-17 RX ADMIN — HEPARIN SODIUM 5000 UNITS: 5000 INJECTION INTRAVENOUS; SUBCUTANEOUS at 13:56

## 2020-09-17 RX ADMIN — INSULIN LISPRO 15 UNITS: 100 INJECTION, SOLUTION INTRAVENOUS; SUBCUTANEOUS at 17:20

## 2020-09-17 RX ADMIN — INSULIN LISPRO 1 UNITS: 100 INJECTION, SOLUTION INTRAVENOUS; SUBCUTANEOUS at 17:19

## 2020-09-17 RX ADMIN — INSULIN LISPRO 15 UNITS: 100 INJECTION, SOLUTION INTRAVENOUS; SUBCUTANEOUS at 08:24

## 2020-09-17 RX ADMIN — BACITRACIN, NEOMYCIN, POLYMYXIN B 1 SMALL APPLICATION: 400; 3.5; 5 OINTMENT TOPICAL at 17:18

## 2020-09-17 RX ADMIN — INSULIN LISPRO 1 UNITS: 100 INJECTION, SOLUTION INTRAVENOUS; SUBCUTANEOUS at 08:23

## 2020-09-17 RX ADMIN — CLOPIDOGREL BISULFATE 75 MG: 75 TABLET ORAL at 08:23

## 2020-09-17 RX ADMIN — DOCUSATE SODIUM 100 MG: 100 CAPSULE, LIQUID FILLED ORAL at 08:23

## 2020-09-17 RX ADMIN — ACETAMINOPHEN 650 MG: 325 TABLET, FILM COATED ORAL at 00:17

## 2020-09-17 RX ADMIN — DAPTOMYCIN 600 MG: 500 INJECTION, POWDER, LYOPHILIZED, FOR SOLUTION INTRAVENOUS at 11:28

## 2020-09-17 RX ADMIN — VERAPAMIL HYDROCHLORIDE 240 MG: 120 TABLET, FILM COATED, EXTENDED RELEASE ORAL at 08:23

## 2020-09-17 RX ADMIN — ASPIRIN 81 MG 81 MG: 81 TABLET ORAL at 08:23

## 2020-09-17 RX ADMIN — INSULIN GLARGINE 30 UNITS: 100 INJECTION, SOLUTION SUBCUTANEOUS at 21:44

## 2020-09-17 RX ADMIN — ATORVASTATIN CALCIUM 40 MG: 40 TABLET, FILM COATED ORAL at 17:18

## 2020-09-17 RX ADMIN — PANTOPRAZOLE SODIUM 20 MG: 20 TABLET, DELAYED RELEASE ORAL at 05:48

## 2020-09-17 RX ADMIN — INSULIN LISPRO 15 UNITS: 100 INJECTION, SOLUTION INTRAVENOUS; SUBCUTANEOUS at 11:35

## 2020-09-17 NOTE — ASSESSMENT & PLAN NOTE
Lab Results   Component Value Date    HGBA1C 8 1 (H) 08/28/2020       Recent Labs     09/16/20  1738 09/16/20  2120 09/17/20  0556 09/17/20  1116   POCGLU 180* 145* 150* 262*       Blood Sugar Average: Last 72 hrs:  · (P) 187 3125       Hemoglobin A1c 8 1  Blood sugars improving  Continue Lantus 30 units subcu HS, Humalog 15 units t i d    Continue SSI  Continue to monitor sugar

## 2020-09-17 NOTE — CASE MANAGEMENT
Jamarcus was informed by Nursing that the pt would like to speak with CM  CM went to speak with the pt, but he indicated that he was busy  CM will follow up

## 2020-09-17 NOTE — CASE MANAGEMENT
CM checked [employer] and there was no Michelene Kras indicated  CM met with the pt and he states that he has arranged transport to the facility  He states that he has spoken with the accepting facility regarding his concerns and feels comfortable with the dc placement  CM will inform the pt when the auth has been obtained

## 2020-09-17 NOTE — PROGRESS NOTES
Progress Note - Infectious Disease   Patricia Shearer 60 y o  male MRN: [id number]  Unit/Bed#: -01 Encounter: [id number]      A/P:  1  MRSA bacteremia   Secondary to #2/3   Negative 2D echo   No intravascular prosthetic devices   Repeat blood culture are negative  Antibiotic was changed to daptomycin due to elevated creatinine, supratherapeutic levels      -continue IV daptomycin 600 mg Q 24 hours based on renal function  -plan for 4 week course of IV antibiotics from negative blood culture, through 9/27   -check weekly CBC with diff, creatinine, CPK  Next CPK on 09/19   -outpatient ID follow-up after hospital discharge  -discontinue central line after completion of IV antibiotic  -check surveillance blood cultures 2 weeks after completion of antibiotic     2  Right foot cellulitis   Suspect secondary to MRSA   Wound culture also shows Morganella, Enterobacter, Enterococcus, which is not unexpected finding in the setting of chronic ulceration   Would continue targeted therapy for MRSA infection   Now status post TMA     -antibiotic plan as above  -serial foot exams     3  Right foot nonhealing ulceration   With residual 1st and 2nd toe osteomyelitis   Podiatry input noted  Now s/p TMA on 09/03 with presumed surgical cure of osteomyelitis  There is evidence of dehiscence medially on re-evaluation  Patient remains at high risk of poor wound healing and limb loss due to uncontrolled diabetes, PAD      -antibiotic plan as above  -daily local wound care and dressing changes  -close outpatient podiatry follow-up  -patient remains at high risk of limb loss     4  Status post 1st digit amputation on 08/11/2020   Complicated by above      5   Acute kidney injury   Nephrology input noted with lower suspicion for vancomycin toxicity   Consider secondary to urinary retention   Now status post Mae catheter placement   No acute findings on renal ultrasound  Antibiotic was changed to daptomycin, as above, as vancomycin induced toxicity could not be ruled out  Creatinine continues to slowly trend down, now down to 1 8      -antibiotic as above  -management of Mae catheter per Urology  -nephrology follow-up ongoing  -monitor BMP closely     6  PAD   Status post recent revascularization   Vascular surgery input noted      7  Uncontrolled diabetes mellitus   Risk factor for severe infection      8  Leukocytosis  Likely reactive in the immediate postoperative setting   Remains afebrile   Blood cultures showed clearance of bacteremia   WBC count remains stable      -antibiotic plan as above  -monitor CBC  -monitor temperatures     Daptomycin 7  POD11    Pending placement to rehab          Subjective:  Feels okay  No fevers or chills  Dressing was changed by Podiatry with evidence of dehiscence medially  No purulent drainage  Objective:  Vitals:  Temp:  [97 7 °F (36 5 °C)-98 °F (36 7 °C)] 97 7 °F (36 5 °C)  HR:  [71-72] 72  Resp:  [18] 18  BP: (123-143)/(75-97) 143/97  SpO2:  [95 %-96 %] 96 %  Temp (24hrs), Av 9 °F (36 6 °C), Min:97 7 °F (36 5 °C), Max:98 °F (36 7 °C)  Current: Temperature: 97 7 °F (36 5 °C)    Physical Exam:   General:  No acute distress  HEENT:  Atraumatic normocephalic  Psychiatric:  Awake and alert  Pulmonary:  Normal respiratory excursion without accessory muscle use  Abdomen:  Soft, nontender  Mae in place with clear yellow urine  Extremities:  No edema  Skin:  No rashes  Foot dressing intact with no ascending erythema    Lab Results:  I have personally reviewed pertinent labs    Results from last 7 days   Lab Units 09/17/20  0557 09/16/20  0512 09/15/20  0630   POTASSIUM mmol/L 3 6 3 5 3 5   CHLORIDE mmol/L 102 101 101   CO2 mmol/L 28 28 28   BUN mg/dL 31* 32* 32*   CREATININE mg/dL 1 88* 1 96* 1 99*   EGFR ml/min/1 73sq m 39 37 36   CALCIUM mg/dL 8 9 8 9 8 9     Results from last 7 days   Lab Units 20  0557 20  0512 09/15/20  0630   WBC Thousand/uL 11 00* 10 94* 10 46*   HEMOGLOBIN g/dL 9  7* 9 6* 9 5*   PLATELETS Thousands/uL 426* 398* 380           Imaging Studies:   I have personally reviewed pertinent imaging study reports and images in PACS  EKG, Pathology, and Other Studies:   I have personally reviewed pertinent reports

## 2020-09-17 NOTE — CASE MANAGEMENT
Cm spoke with Podiatry and they informed her that a wound vac will be ordered for the pt to begin at the Acute rehab  CM informed her that the pt will be AR'ed to [facility] and they are awaiting the [facility]

## 2020-09-17 NOTE — PROGRESS NOTES
Progress Note - Roma Soto 60 y o  male MRN: [id number]    Unit/Bed#: -18 Encounter: [id number]      Assessment:  1) S/P TMA right foot secondary to osteomyelitis and nonhealing ulcer, with dehiscence of incision site  2) PAD  3) Uncontrolled diabetes  4) Renal disease  5) Diabetic neuropathy     Plan:  1) Dressings are changed with maxsorb  and DSD  - serous drainage is noted  - slough tissue and dehiscence medially is noted  2) Dressing changes for nursing orders are placed  3) Continue IV antibiotics as per Medicine and ID  4) Strict nonweightbearing to the right foot  5) Serial foot exams  6) With patient's uncontrolled diabetes, PAD, and infection status he remains at high risk for loss of limb, this is discussed with patient that this procedure will be the final foot surgery possible and if it does not heal he will need a leg amputation, patient voiced understanding   7) Patient has continued poor healing of the TMA and further dehiscence, he will likely need HBOT and serial debridements of the foot in order to salvage along with the long term IV antibiotic therapy  8) The likelyhood of this TMA being successful is low secondary to his uncontrolled diabetes, renal disease, infectious state, and PAD  Patient is also not eating food within his diabetic diet as his girlfriend is bringing him outside fast food as noted by nursing  This is again discussed with patient, he voiced understanding  9) Patient to have Wound VAC applied at rehab, I will place an order for the Wound VAC to be delivered to the patient, the Wound VAC will be the last option to save TMA, this is discussed with the patient, he voiced understanding     Subjective:   Patient is seen bedside resting comfortably  NAD  Objective:     Vitals: Blood pressure 143/97, pulse 72, temperature 97 7 °F (36 5 °C), resp  rate 18, height 6' 4" (1 93 m), weight 115 kg (253 lb 12 oz), SpO2 96 %  ,Body mass index is 30 89 kg/m²        Intake/Output Summary (Last 24 hours) at 9/17/2020 1331  Last data filed at 9/17/2020 0839  Gross per 24 hour   Intake 840 ml   Output 1850 ml   Net -1010 ml       Physical Exam: sutures are intact, maceration to incision site, serous drainage is noted, skin incision site shows full dehiscence, slough tissue is noted with very poor coaptation at the lateral aspect and medial aspect of the incision, dehiscence is 10 cm x 0 5 cm x 0 4 cm, there is an are of probing to subcutaneous tissue at the lateral aspect, no purulence, no fluctuence, no erythema, the skin around the incision site remains pink and viable, no signs of necrosis at this time    Invasive Devices     Peripherally Inserted Central Catheter Line            PICC Line 09/08/20 Right Other (Comment) 8 days          Drain            Urethral Catheter 18 Fr  9 days

## 2020-09-17 NOTE — PROGRESS NOTES
Progress Note - Savannah Guzman 1965, 60 y o  male MRN: [id number]    Unit/Bed#: -01 Encounter: [id number]    Primary Care Provider: Anson Watson MD   Date and time admitted to hospital: 8/28/2020  3:14 PM        * Osteomyelitis [facility])  Assessment & Plan  Recent history of right hallux amputation  Presented with wound dehiscence, wound cellulitis, possible osteomyelitis  2/2 blood culture from 08/28/2020 growing MRSA  Right foot MRI report reviewed and noted for osteomyelitis  Status post right-sided TMA    Was switched from vancomycin to  daptomycin given ATN    Currently afebrile, hemodynamically stable  Encourage for strict nonweightbearing to right lower extremity at this time  Continue dressing changes per podiatrist recommendation  Acute kidney injury ([facility]) with acute tubular necrosis (ATN) (HCC)  Assessment & Plan  Likely secondary to sepsis/surgery,+/- urinary retention  Creatinine peaked to 2 32 on 9/5  Baseline appears to be 0 8-0 9  Creatinine currently stable an improving  nephro following recs appreciated    Patient required 3 straight catheterization,Mae inserted per protocol  C/w Flomax   Avoid nephrotoxin and hypotension  Medically stable for discharge  follow-up with Nephrology in the outpatient basis    MRSA bacteremia  Assessment & Plan  2/2 culture from 08/28/2020 growing MRSA  Likely source foot wound  Negative 2D echo  Repeat blood cultures remain negative  Continue IV daptomycin total of 4 weeks through 9/27  Weekly CBC with diff, creatinine, vancomycin level  Outpatient ID follow-up after hospital discharge  Vascular surgery, Podiatry following    ID recommendations appreciated      Type 2 diabetes mellitus with diabetic polyneuropathy, with long-term current use of insulin [facility])  Assessment & Plan  Lab Results   Component Value Date    HGBA1C 8 1 (H) 08/28/2020       Recent Labs     09/16/20  1738 09/16/20  2120 09/17/20  0556 09/17/20  1116   POCGLU 180* 145* 150* 262*       Blood Sugar Average: Last 72 hrs:  · (P) 187 3125       Hemoglobin A1c 8 1  Blood sugars improving  Continue Lantus 30 units subcu HS, Humalog 15 units t i d  Continue SSI  Continue to monitor sugar          VTE Pharmacologic Prophylaxis:   Pharmacologic: Enoxaparin (Lovenox)  Mechanical VTE Prophylaxis in Place: Yes    Patient Centered Rounds: I have performed bedside rounds with nursing staff today  Discussions with Specialists or Other Care Team Provider:  Case management    Education and Discussions with Family / Patient:  Patient    Time Spent for Care: 30 minutes  More than 50% of total time spent on counseling and coordination of care as described above  Current Length of Stay: 20 day(s)    Current Patient Status: Inpatient   Certification Statement: The patient will continue to require additional inpatient hospital stay due to Placement    Discharge Plan:  Pending placement    Code Status: Level 1 - Full Code      Subjective:   Patient seen and examined  No complaints at this time  He is awaiting placement    Objective:     Vitals:   Temp (24hrs), Av 7 °F (36 5 °C), Min:97 7 °F (36 5 °C), Max:97 7 °F (36 5 °C)    Temp:  [97 7 °F (36 5 °C)] 97 7 °F (36 5 °C)  HR:  [70-72] 70  Resp:  [18] 18  BP: (139-143)/(72-97) 139/72  SpO2:  [96 %-97 %] 97 %  Body mass index is 30 89 kg/m²  Input and Output Summary (last 24 hours): Intake/Output Summary (Last 24 hours) at 2020 1704  Last data filed at 2020 1335  Gross per 24 hour   Intake 1080 ml   Output 2700 ml   Net -1620 ml       Physical Exam:     Physical Exam  Vitals signs and nursing note reviewed  Constitutional:       Appearance: Normal appearance  HENT:      Head: Normocephalic  Cardiovascular:      Rate and Rhythm: Normal rate and regular rhythm  Pulmonary:      Effort: Pulmonary effort is normal       Breath sounds: Normal breath sounds  Abdominal:      General: Abdomen is flat   Bowel sounds are normal    Musculoskeletal: Normal range of motion  Skin:     General: Skin is warm and dry  Neurological:      Mental Status: He is alert  Additional Data:     Labs:    Results from last 7 days   Lab Units 09/17/20  0557   WBC Thousand/uL 11 00*   HEMOGLOBIN g/dL 9 7*   HEMATOCRIT % 29 1*   PLATELETS Thousands/uL 426*   NEUTROS PCT % 60   LYMPHS PCT % 24   MONOS PCT % 9   EOS PCT % 5     Results from last 7 days   Lab Units 09/17/20  0557   SODIUM mmol/L 138   POTASSIUM mmol/L 3 6   CHLORIDE mmol/L 102   CO2 mmol/L 28   BUN mg/dL 31*   CREATININE mg/dL 1 88*   ANION GAP mmol/L 8   CALCIUM mg/dL 8 9   GLUCOSE RANDOM mg/dL 157*         Results from last 7 days   Lab Units 09/17/20  1116 09/17/20  0556 09/16/20  2120 09/16/20  1738 09/16/20  1623 09/16/20  1311 09/16/20  1016 09/16/20  0647 09/15/20  2047 09/15/20  1722 09/15/20  1036 09/15/20  0717   POC GLUCOSE mg/dl 262* 150* 145* 180* 153* 137 170* 130 211* 176* 264* 161*                   * I Have Reviewed All Lab Data Listed Above  * Additional Pertinent Lab Tests Reviewed:  Pradeep 66 Admission Reviewed    Imaging:    Imaging Reports Reviewed Today Include:   Imaging Personally Reviewed by Myself Includes:     Recent Cultures (last 7 days):           Last 24 Hours Medication List:   Current Facility-Administered Medications   Medication Dose Route Frequency Provider Last Rate    acetaminophen  650 mg Oral Q6H PRN Rhonda Marrero PA-C      aluminum-magnesium hydroxide-simethicone  30 mL Oral Q6H PRN Anahy Yung DPM      aspirin  81 mg Oral Daily [place], [place]      atorvastatin  40 mg Oral Daily With [employer], DPM      clopidogrel  75 mg Oral Daily [place], [place]      DAPTOmycin  6 mg/kg (Adjusted) Intravenous Q24H Finoa Aldea,  mg (09/17/20 1128)    docusate sodium  100 mg Oral BID Clinton Greenfield MD      heparin (porcine)  5,000 Units Subcutaneous Q8H [address] NAILA Suarez      insulin glargine  30 Units Subcutaneous HS Darryl Mazariegos MD      insulin lispro  1-5 Units Subcutaneous 4x Daily (AC & HS) [facility], DPM      insulin lispro  15 Units Subcutaneous TID With Meals [facility], DPM      LORazepam  0 5 mg Oral BID PRN [facility], DPM      methimazole  5 mg Oral Daily [facility], [place]      neomycin-bacitracin-polymyxin b  1 small application Topical BID NAILA Riggs      ondansetron  4 mg Intravenous Q6H PRN [facility], DPM      oxyCODONE  10 mg Oral Q6H PRN Darryl Mazariegos MD      oxyCODONE  5 mg Oral Q6H PRN Darryl Mazariegos MD      pantoprazole  20 mg Oral Early Morning [facility], DPM      polyethylene glycol  17 g Oral Daily PRN [facility], DPM      tamsulosin  0 4 mg Oral Daily With Titi Banuelos MD      verapamil  240 mg Oral Daily [facility], DPM      zolpidem  10 mg Oral HS PRN [facility], DPM          Today, Patient Was Seen By: Darryl Mazariegos MD    ** Please Note: Dictation voice to text software may have been used in the creation of this document   **

## 2020-09-18 DIAGNOSIS — R78.81 STAPHYLOCOCCUS AUREUS BACTEREMIA: Primary | ICD-10-CM

## 2020-09-18 DIAGNOSIS — B95.61 STAPHYLOCOCCUS AUREUS BACTEREMIA: Primary | ICD-10-CM

## 2020-09-18 LAB
ANION GAP SERPL CALCULATED.3IONS-SCNC: 6 MMOL/L (ref 4–13)
BASOPHILS # BLD AUTO: 0.1 THOUSANDS/ΜL (ref 0–0.1)
BASOPHILS NFR BLD AUTO: 1 % (ref 0–1)
BUN SERPL-MCNC: 28 MG/DL (ref 5–25)
CALCIUM SERPL-MCNC: 9.4 MG/DL (ref 8.3–10.1)
CHLORIDE SERPL-SCNC: 102 MMOL/L (ref 100–108)
CO2 SERPL-SCNC: 28 MMOL/L (ref 21–32)
CREAT SERPL-MCNC: 1.69 MG/DL (ref 0.6–1.3)
EOSINOPHIL # BLD AUTO: 0.43 THOUSAND/ΜL (ref 0–0.61)
EOSINOPHIL NFR BLD AUTO: 4 % (ref 0–6)
ERYTHROCYTE [DISTWIDTH] IN BLOOD BY AUTOMATED COUNT: 12.2 % (ref 11.6–15.1)
GFR SERPL CREATININE-BSD FRML MDRD: 44 ML/MIN/1.73SQ M
GLUCOSE SERPL-MCNC: 128 MG/DL (ref 65–140)
GLUCOSE SERPL-MCNC: 128 MG/DL (ref 65–140)
GLUCOSE SERPL-MCNC: 133 MG/DL (ref 65–140)
GLUCOSE SERPL-MCNC: 165 MG/DL (ref 65–140)
GLUCOSE SERPL-MCNC: 171 MG/DL (ref 65–140)
HCT VFR BLD AUTO: 29.8 % (ref 36.5–49.3)
HGB BLD-MCNC: 9.8 G/DL (ref 12–17)
IMM GRANULOCYTES # BLD AUTO: 0.06 THOUSAND/UL (ref 0–0.2)
IMM GRANULOCYTES NFR BLD AUTO: 1 % (ref 0–2)
LYMPHOCYTES # BLD AUTO: 2.51 THOUSANDS/ΜL (ref 0.6–4.47)
LYMPHOCYTES NFR BLD AUTO: 23 % (ref 14–44)
MCH RBC QN AUTO: 29.3 PG (ref 26.8–34.3)
MCHC RBC AUTO-ENTMCNC: 32.9 G/DL (ref 31.4–37.4)
MCV RBC AUTO: 89 FL (ref 82–98)
MONOCYTES # BLD AUTO: 1.11 THOUSAND/ΜL (ref 0.17–1.22)
MONOCYTES NFR BLD AUTO: 10 % (ref 4–12)
NEUTROPHILS # BLD AUTO: 6.96 THOUSANDS/ΜL (ref 1.85–7.62)
NEUTS SEG NFR BLD AUTO: 61 % (ref 43–75)
NRBC BLD AUTO-RTO: 0 /100 WBCS
PLATELET # BLD AUTO: 450 THOUSANDS/UL (ref 149–390)
PMV BLD AUTO: 10.5 FL (ref 8.9–12.7)
POTASSIUM SERPL-SCNC: 3.6 MMOL/L (ref 3.5–5.3)
RBC # BLD AUTO: 3.35 MILLION/UL (ref 3.88–5.62)
SODIUM SERPL-SCNC: 136 MMOL/L (ref 136–145)
WBC # BLD AUTO: 11.17 THOUSAND/UL (ref 4.31–10.16)

## 2020-09-18 PROCEDURE — 82948 REAGENT STRIP/BLOOD GLUCOSE: CPT

## 2020-09-18 PROCEDURE — 80048 BASIC METABOLIC PNL TOTAL CA: CPT | Performed by: INTERNAL MEDICINE

## 2020-09-18 PROCEDURE — 99232 SBSQ HOSP IP/OBS MODERATE 35: CPT | Performed by: INTERNAL MEDICINE

## 2020-09-18 PROCEDURE — 85025 COMPLETE CBC W/AUTO DIFF WBC: CPT | Performed by: INTERNAL MEDICINE

## 2020-09-18 RX ORDER — SODIUM CHLORIDE 9 MG/ML
20 INJECTION, SOLUTION INTRAVENOUS ONCE
Status: CANCELLED | OUTPATIENT
Start: 2020-09-22

## 2020-09-18 RX ADMIN — DAPTOMYCIN 600 MG: 500 INJECTION, POWDER, LYOPHILIZED, FOR SOLUTION INTRAVENOUS at 13:25

## 2020-09-18 RX ADMIN — VERAPAMIL HYDROCHLORIDE 240 MG: 120 TABLET, FILM COATED, EXTENDED RELEASE ORAL at 08:01

## 2020-09-18 RX ADMIN — METHIMAZOLE 5 MG: 5 TABLET ORAL at 08:01

## 2020-09-18 RX ADMIN — DOCUSATE SODIUM 100 MG: 100 CAPSULE, LIQUID FILLED ORAL at 18:00

## 2020-09-18 RX ADMIN — INSULIN LISPRO 15 UNITS: 100 INJECTION, SOLUTION INTRAVENOUS; SUBCUTANEOUS at 13:26

## 2020-09-18 RX ADMIN — INSULIN LISPRO 1 UNITS: 100 INJECTION, SOLUTION INTRAVENOUS; SUBCUTANEOUS at 18:00

## 2020-09-18 RX ADMIN — INSULIN LISPRO 15 UNITS: 100 INJECTION, SOLUTION INTRAVENOUS; SUBCUTANEOUS at 18:01

## 2020-09-18 RX ADMIN — DOCUSATE SODIUM 100 MG: 100 CAPSULE, LIQUID FILLED ORAL at 08:01

## 2020-09-18 RX ADMIN — INSULIN LISPRO 15 UNITS: 100 INJECTION, SOLUTION INTRAVENOUS; SUBCUTANEOUS at 08:02

## 2020-09-18 RX ADMIN — CLOPIDOGREL BISULFATE 75 MG: 75 TABLET ORAL at 08:01

## 2020-09-18 RX ADMIN — INSULIN LISPRO 1 UNITS: 100 INJECTION, SOLUTION INTRAVENOUS; SUBCUTANEOUS at 13:26

## 2020-09-18 RX ADMIN — ACETAMINOPHEN 650 MG: 325 TABLET, FILM COATED ORAL at 22:27

## 2020-09-18 RX ADMIN — PANTOPRAZOLE SODIUM 20 MG: 20 TABLET, DELAYED RELEASE ORAL at 06:02

## 2020-09-18 RX ADMIN — BACITRACIN, NEOMYCIN, POLYMYXIN B 1 SMALL APPLICATION: 400; 3.5; 5 OINTMENT TOPICAL at 08:01

## 2020-09-18 RX ADMIN — HEPARIN SODIUM 5000 UNITS: 5000 INJECTION INTRAVENOUS; SUBCUTANEOUS at 06:02

## 2020-09-18 RX ADMIN — ATORVASTATIN CALCIUM 40 MG: 40 TABLET, FILM COATED ORAL at 18:00

## 2020-09-18 RX ADMIN — INSULIN GLARGINE 30 UNITS: 100 INJECTION, SOLUTION SUBCUTANEOUS at 22:01

## 2020-09-18 RX ADMIN — BACITRACIN, NEOMYCIN, POLYMYXIN B 1 SMALL APPLICATION: 400; 3.5; 5 OINTMENT TOPICAL at 18:00

## 2020-09-18 RX ADMIN — ASPIRIN 81 MG 81 MG: 81 TABLET ORAL at 08:01

## 2020-09-18 RX ADMIN — HEPARIN SODIUM 5000 UNITS: 5000 INJECTION INTRAVENOUS; SUBCUTANEOUS at 22:01

## 2020-09-18 RX ADMIN — TAMSULOSIN HYDROCHLORIDE 0.4 MG: 0.4 CAPSULE ORAL at 18:00

## 2020-09-18 NOTE — CASE MANAGEMENT
[facility] [phone] GAL called the [facility] CM to update her that the pt has been referred for STR and acute rehab  The pt has been denied for acute rehab at [facility]  The pt was approved for STR but is refusing and wants to go home  The pt will need IV nurse, has a casey cath, will need woundvac as well as PT/OT  She states that she will call the pt and speak with him  CM was informed to day that the insurance has stated that the pt needs can be met at a lower level of care  The pt is able to have a peer to peer done [phone]  Or the pt is able to appeal the decision by calling [phone] or the CM is able to call the insurance for a SNF auth @ [phone]    GAL and Alicia Cronin spoke with the pt and informed him that he was denied Acute care placement because he did not meet the criteria  The pt states that he does not want to go to STR and would like to go home  CM and SLIM discussed the importance of a STR to meet all of his needs  The pt has a pic for IV abx for the next 9 days, he has a casey and will need wound vac for his foot  The pt insist that his girlfriend will be able to assist him with all those needs  CM informed him that she did not believe his insurance would cover the cost of the infusion nurse and he may have to pay out of pocket  The pt was agreeable  CM also discussed the need for the wound vac and nursing would be necessary  Th ept states that he has had woundvac in the past and his gorlfriend would be able to assist   The CM discussed the casey and was not sure that the [facility] would be able to conduct the voiding trial in the home  Alicia Cronin states that she will speak with urology  CM spoke Dr Becca Nelson in [facility] and provided her with an update  She does not believe the pt girlfriend is able to provide him with the necessary care and support needed in the home enviornment and she will be calling and advising the pt to go to STR

## 2020-09-18 NOTE — CASE MANAGEMENT
CM contacted ID to request an order be sent to [facility] for the Infusion center to begin on Tuesday    GAL requested a script for the woundvac from podiatry, Dr Jaden Beatty which was faxed to [facility] for a Monday delivery    CM referred the pt for Sudheer Schaefer with woundcare to begin on Tuesday  CM will follow up on Monday to dc pt home with the above services

## 2020-09-18 NOTE — PROGRESS NOTES
Progress Note - Infectious Disease   Ned Prajapati 60 y o  male MRN: [id number]  Unit/Bed#: -01 Encounter: [id number]         A/P:  1  MRSA bacteremia   Secondary to #2/3   Negative 2D echo   No intravascular prosthetic devices   Repeat blood culture are negative  Antibiotic was changed to daptomycin due to elevated creatinine, supratherapeutic levels      -continue IV daptomycin 600 mg Q 24 hours based on renal function  -plan for 4 week course of IV antibiotic from negative blood culture, through 9/27   -check weekly CBC with diff, creatinine, CPK   Next CPK on 09/19  Will order CPK for a tiffany Wyatt Cassette -outpatient ID follow-up after hospital discharge  -discontinue central line after completion of IV antibiotic  -check surveillance blood cultures 2 weeks after completion of antibiotic     2  Right foot cellulitis   Suspect secondary to MRSA   Wound culture also shows Morganella, Enterobacter, Enterococcus, which is not unexpected finding in the setting of chronic ulceration   Would continue targeted therapy for MRSA infection   Now status post TMA     -antibiotic plan as above  -serial foot exams     3  Right foot nonhealing ulceration   With residual 1st and 2nd toe osteomyelitis   Podiatry input noted  Now s/p TMA on 09/03 with presumed surgical cure of osteomyelitis  There is evidence of dehiscence medially on re-evaluation  Patient remains at high risk of poor wound healing and limb loss due to uncontrolled diabetes, PAD      -antibiotic plan as above  -daily local wound care and dressing changes  -close outpatient podiatry follow-up  -patient remains at high risk of limb loss     4  Status post 1st digit amputation on 08/11/2020   Complicated by above      5   Acute kidney injury   Nephrology input noted with lower suspicion for vancomycin toxicity   Consider secondary to urinary retention   Now status post Mae catheter placement   No acute findings on renal ultrasound  Antibiotic was changed to daptomycin, as above, as vancomycin induced toxicity could not be ruled out   Creatinine continues to slowly trend down, now down to 1 69      -antibiotic as above  -management of Mae catheter per Urology  -nephrology follow-up ongoing  -monitor BMP closely     6  PAD   Status post recent revascularization   Vascular surgery input noted      7  Uncontrolled diabetes mellitus   Risk factor for severe infection      8  Leukocytosis  Likely reactive in the immediate postoperative setting   Remains afebrile   Blood cultures showed clearance of bacteremia   WBC count remains stable      -antibiotic plan as above  -monitor CBC  -monitor temperatures     Daptomycin 8  POD12     Patient is still pending placement to facility  Scripts for IV antibiotic and blood work were provided to case management in case patient does opt to go home  If patient remains in hospital, will formally re-evaluate on   Please call us with any new questions in the interim          Subjective:  Feels well  No new complaints  Tolerating oral intake  Denies fevers, chills, or sweats  Denies nausea, vomiting, or diarrhea  Objective:  Vitals:  Temp:  [97 3 °F (36 3 °C)-97 7 °F (36 5 °C)] 97 3 °F (36 3 °C)  HR:  [70-90] 90  Resp:  [18] 18  BP: (139-145)/(72-89) 145/89  SpO2:  [96 %-97 %] 96 %  Temp (24hrs), Av 5 °F (36 4 °C), Min:97 3 °F (36 3 °C), Max:97 7 °F (36 5 °C)  Current: Temperature: (!) 97 3 °F (36 3 °C)    Physical Exam:   General:  No acute distress  Psychiatric:  Awake and alert  Pulmonary:  Normal respiratory excursion without accessory muscle use  Chest wall catheter in place  Abdomen:  Soft, nontender  Mae in place with clear yellow urine  Extremities:  No edema  Skin:  No rashes  Foot dressing in place with no ascending erythema    Lab Results:  I have personally reviewed pertinent labs    Results from last 7 days   Lab Units 20  0604 20  0557 20  0512   POTASSIUM mmol/L 3 6 3 6 3 5   CHLORIDE mmol/L 102 102 101   CO2 mmol/L 28 28 28   BUN mg/dL 28* 31* 32*   CREATININE mg/dL 1 69* 1 88* 1 96*   EGFR ml/min/1 73sq m 44 39 37   CALCIUM mg/dL 9 4 8 9 8 9     Results from last 7 days   Lab Units 09/18/20  0604 09/17/20  0557 09/16/20  0512   WBC Thousand/uL 11 17* 11 00* 10 94*   HEMOGLOBIN g/dL 9 8* 9 7* 9 6*   PLATELETS Thousands/uL 450* 426* 398*           Imaging Studies:   I have personally reviewed pertinent imaging study reports and images in PACS  EKG, Pathology, and Other Studies:   I have personally reviewed pertinent reports

## 2020-09-18 NOTE — PROGRESS NOTES
Progress Note - Rupa Hurley 1965, 60 y o  male MRN: [id number]    Unit/Bed#: -01 Encounter: [id number]    Primary Care Provider: Trevon Gardner MD   Date and time admitted to hospital: 8/28/2020  3:14 PM        * Osteomyelitis [facility])  Assessment & Plan  Recent history of right hallux amputation  Presented with wound dehiscence, wound cellulitis, possible osteomyelitis  2/2 blood culture from 08/28/2020 growing MRSA  Right foot MRI report reviewed and noted for osteomyelitis  Status post right-sided TMA    Was switched from vancomycin to  daptomycin given ATN    Currently afebrile, hemodynamically stable  Encourage for strict nonweightbearing to right lower extremity at this time  Continue dressing changes per podiatrist recommendation  Acute kidney injury (MARKELL) with acute tubular necrosis (ATN) (HCC)  Assessment & Plan  Likely secondary to sepsis/surgery,+/- urinary retention  Creatinine peaked to 2 32 on 9/5  Baseline appears to be 0 8-0 9  Creatinine currently stable an improving  nephro following recs appreciated    Patient required 3 straight catheterization,Mae inserted per protocol  C/w Flomax   Avoid nephrotoxin and hypotension  Medically stable for discharge  follow-up with Nephrology in the outpatient basis    MRSA bacteremia  Assessment & Plan  2/2 culture from 08/28/2020 growing MRSA  Likely source foot wound  Negative 2D echo  Repeat blood cultures remain negative  Continue IV daptomycin total of 4 weeks through 9/27  Weekly CBC with diff, creatinine, vancomycin level  Outpatient ID follow-up after hospital discharge  Vascular surgery, Podiatry following  ID recommendations appreciated      Urinary retention  Assessment & Plan  Required 3 straight straight catheterization,yielded 500 - 700ml urine  Mae inserted 09/8/20  Will be dc with catheter to rehab   Voiding trial at rehab  Started Flomax  Patient denies constipation  Will follow-up outpatient with Urology      VTE Pharmacologic Prophylaxis:   Pharmacologic: Heparin  Mechanical VTE Prophylaxis in Place: Yes    Patient Centered Rounds: I have performed bedside rounds with nursing staff today  Discussions with Specialists or Other Care Team Provider:     Education and Discussions with Family / Patient:   Time Spent for Care: 20 minutes  More than 50% of total time spent on counseling and coordination of care as described above  Current Length of Stay: 21 day(s)    Current Patient Status: Inpatient   Certification Statement: The patient will continue to require additional inpatient hospital stay due to Placement    Discharge Plan:  Pending placement    Code Status: Level 1 - Full Code      Subjective:   Patient seen examined  No acute complaints feels better    Objective:     Vitals:   Temp (24hrs), Av 6 °F (36 4 °C), Min:97 3 °F (36 3 °C), Max:97 9 °F (36 6 °C)    Temp:  [97 3 °F (36 3 °C)-97 9 °F (36 6 °C)] 97 9 °F (36 6 °C)  HR:  [83-90] 83  Resp:  [18] 18  BP: (138-145)/(89) 138/89  SpO2:  [96 %-97 %] 97 %  Body mass index is 30 89 kg/m²  Input and Output Summary (last 24 hours): Intake/Output Summary (Last 24 hours) at 2020 1742  Last data filed at 2020 0648  Gross per 24 hour   Intake 600 ml   Output 1700 ml   Net -1100 ml       Physical Exam:     Physical Exam  Vitals signs and nursing note reviewed  Constitutional:       Appearance: Normal appearance  Neck:      Musculoskeletal: Normal range of motion  Cardiovascular:      Rate and Rhythm: Normal rate  Pulmonary:      Effort: Pulmonary effort is normal    Abdominal:      General: Abdomen is flat  Musculoskeletal: Normal range of motion  Skin:     General: Skin is warm  Neurological:      General: No focal deficit present  Mental Status: He is alert             Additional Data:     Labs:    Results from last 7 days   Lab Units 20  0604   WBC Thousand/uL 11 17*   HEMOGLOBIN g/dL 9 8*   HEMATOCRIT % 29 8*   PLATELETS Thousands/uL 450*   NEUTROS PCT % 61   LYMPHS PCT % 23   MONOS PCT % 10   EOS PCT % 4     Results from last 7 days   Lab Units 09/18/20  0604   SODIUM mmol/L 136   POTASSIUM mmol/L 3 6   CHLORIDE mmol/L 102   CO2 mmol/L 28   BUN mg/dL 28*   CREATININE mg/dL 1 69*   ANION GAP mmol/L 6   CALCIUM mg/dL 9 4   GLUCOSE RANDOM mg/dL 133         Results from last 7 days   Lab Units 09/18/20  1526 09/18/20  1127 09/18/20  0611 09/17/20  2114 09/17/20  1718 09/17/20  1116 09/17/20  0556 09/16/20  2120 09/16/20  1738 09/16/20  1623 09/16/20  1311 09/16/20  1016   POC GLUCOSE mg/dl 171* 165* 128 156* 151* 262* 150* 145* 180* 153* 137 170*                   * I Have Reviewed All Lab Data Listed Above  * Additional Pertinent Lab Tests Reviewed:  Pradeep 66 Admission Reviewed    Imaging:    Imaging Reports Reviewed Today Include:   Imaging Personally Reviewed by Myself Includes:   Recent Cultures (last 7 days):           Last 24 Hours Medication List:   Current Facility-Administered Medications   Medication Dose Route Frequency Provider Last Rate    acetaminophen  650 mg Oral Q6H PRN Karena Fierro PA-C      aluminum-magnesium hydroxide-simethicone  30 mL Oral Q6H PRN Jamari Motto, DPM      aspirin  81 mg Oral Daily [place], [place]      atorvastatin  40 mg Oral Daily With [employer], DPM      clopidogrel  75 mg Oral Daily [place], [place]      DAPTOmycin  6 mg/kg (Adjusted) Intravenous Q24H Fiona Aldea,  mg (09/18/20 1325)    docusate sodium  100 mg Oral BID Booker Grace MD      heparin (porcine)  5,000 Units Subcutaneous Q8H [address] NAILA Suarez      insulin glargine  30 Units Subcutaneous HS Lalo Thornton MD      insulin lispro  1-5 Units Subcutaneous 4x Daily (AC & HS) Jamari Motto, DPM      insulin lispro  15 Units Subcutaneous TID With Meals Jamari Motto, DPM      LORazepam  0 5 mg Oral BID PRN Jamari Motto, DPM      methimazole  5 mg Oral Daily Ulice Jerrica, DPM      neomycin-bacitracin-polymyxin b  1 small application Topical BID NAILA Villar      ondansetron  4 mg Intravenous Q6H PRN Ulice Jerrica, DPM      oxyCODONE  10 mg Oral Q6H PRN Elfego Ann MD      oxyCODONE  5 mg Oral Q6H PRN Elfego Ann MD      pantoprazole  20 mg Oral Early Morning Ulice Jerrica, DPM      polyethylene glycol  17 g Oral Daily PRN Ulice Jerrica, DPM      tamsulosin  0 4 mg Oral Daily With Emily Hayes MD      verapamil  240 mg Oral Daily Ulice Jerrica, DPM      zolpidem  10 mg Oral HS PRN Ulice Jerrica, DPM          Today, Patient Was Seen By: Elfego Ann MD    ** Please Note: Dictation voice to text software may have been used in the creation of this document   **

## 2020-09-18 NOTE — CASE MANAGEMENT
Cm received a call from Ziggy Rodriguez at [facility] [phone] and she states that she has made several calls to at least 5 agencies and she has not been successful in locating an agency that will be able to meet all of the pt needs in the home  She states that she does not believe the pt will be able to manage his care at home  She believes that the pt may have to go to a STR

## 2020-09-19 LAB
ANION GAP SERPL CALCULATED.3IONS-SCNC: 9 MMOL/L (ref 4–13)
BASOPHILS # BLD AUTO: 0.08 THOUSANDS/ΜL (ref 0–0.1)
BASOPHILS NFR BLD AUTO: 1 % (ref 0–1)
BUN SERPL-MCNC: 25 MG/DL (ref 5–25)
CALCIUM SERPL-MCNC: 9.1 MG/DL (ref 8.3–10.1)
CHLORIDE SERPL-SCNC: 102 MMOL/L (ref 100–108)
CK SERPL-CCNC: 56 U/L (ref 39–308)
CO2 SERPL-SCNC: 28 MMOL/L (ref 21–32)
CREAT SERPL-MCNC: 1.57 MG/DL (ref 0.6–1.3)
EOSINOPHIL # BLD AUTO: 0.43 THOUSAND/ΜL (ref 0–0.61)
EOSINOPHIL NFR BLD AUTO: 4 % (ref 0–6)
ERYTHROCYTE [DISTWIDTH] IN BLOOD BY AUTOMATED COUNT: 12.2 % (ref 11.6–15.1)
GFR SERPL CREATININE-BSD FRML MDRD: 48 ML/MIN/1.73SQ M
GLUCOSE SERPL-MCNC: 151 MG/DL (ref 65–140)
GLUCOSE SERPL-MCNC: 175 MG/DL (ref 65–140)
GLUCOSE SERPL-MCNC: 188 MG/DL (ref 65–140)
GLUCOSE SERPL-MCNC: 87 MG/DL (ref 65–140)
GLUCOSE SERPL-MCNC: 88 MG/DL (ref 65–140)
HCT VFR BLD AUTO: 29.7 % (ref 36.5–49.3)
HGB BLD-MCNC: 9.8 G/DL (ref 12–17)
IMM GRANULOCYTES # BLD AUTO: 0.07 THOUSAND/UL (ref 0–0.2)
IMM GRANULOCYTES NFR BLD AUTO: 1 % (ref 0–2)
LYMPHOCYTES # BLD AUTO: 2.6 THOUSANDS/ΜL (ref 0.6–4.47)
LYMPHOCYTES NFR BLD AUTO: 24 % (ref 14–44)
MCH RBC QN AUTO: 29.3 PG (ref 26.8–34.3)
MCHC RBC AUTO-ENTMCNC: 33 G/DL (ref 31.4–37.4)
MCV RBC AUTO: 89 FL (ref 82–98)
MONOCYTES # BLD AUTO: 1.1 THOUSAND/ΜL (ref 0.17–1.22)
MONOCYTES NFR BLD AUTO: 10 % (ref 4–12)
NEUTROPHILS # BLD AUTO: 6.4 THOUSANDS/ΜL (ref 1.85–7.62)
NEUTS SEG NFR BLD AUTO: 60 % (ref 43–75)
NRBC BLD AUTO-RTO: 0 /100 WBCS
PLATELET # BLD AUTO: 450 THOUSANDS/UL (ref 149–390)
PMV BLD AUTO: 10.3 FL (ref 8.9–12.7)
POTASSIUM SERPL-SCNC: 3.5 MMOL/L (ref 3.5–5.3)
RBC # BLD AUTO: 3.35 MILLION/UL (ref 3.88–5.62)
SODIUM SERPL-SCNC: 139 MMOL/L (ref 136–145)
WBC # BLD AUTO: 10.68 THOUSAND/UL (ref 4.31–10.16)

## 2020-09-19 PROCEDURE — 82550 ASSAY OF CK (CPK): CPT | Performed by: INTERNAL MEDICINE

## 2020-09-19 PROCEDURE — 80048 BASIC METABOLIC PNL TOTAL CA: CPT | Performed by: INTERNAL MEDICINE

## 2020-09-19 PROCEDURE — 82948 REAGENT STRIP/BLOOD GLUCOSE: CPT

## 2020-09-19 PROCEDURE — 85025 COMPLETE CBC W/AUTO DIFF WBC: CPT | Performed by: INTERNAL MEDICINE

## 2020-09-19 PROCEDURE — 99232 SBSQ HOSP IP/OBS MODERATE 35: CPT | Performed by: INTERNAL MEDICINE

## 2020-09-19 RX ADMIN — ASPIRIN 81 MG 81 MG: 81 TABLET ORAL at 08:23

## 2020-09-19 RX ADMIN — HEPARIN SODIUM 5000 UNITS: 5000 INJECTION INTRAVENOUS; SUBCUTANEOUS at 20:42

## 2020-09-19 RX ADMIN — BACITRACIN, NEOMYCIN, POLYMYXIN B 1 SMALL APPLICATION: 400; 3.5; 5 OINTMENT TOPICAL at 08:23

## 2020-09-19 RX ADMIN — METHIMAZOLE 5 MG: 5 TABLET ORAL at 08:23

## 2020-09-19 RX ADMIN — INSULIN LISPRO 15 UNITS: 100 INJECTION, SOLUTION INTRAVENOUS; SUBCUTANEOUS at 12:35

## 2020-09-19 RX ADMIN — INSULIN LISPRO 15 UNITS: 100 INJECTION, SOLUTION INTRAVENOUS; SUBCUTANEOUS at 17:34

## 2020-09-19 RX ADMIN — INSULIN LISPRO 1 UNITS: 100 INJECTION, SOLUTION INTRAVENOUS; SUBCUTANEOUS at 17:34

## 2020-09-19 RX ADMIN — TAMSULOSIN HYDROCHLORIDE 0.4 MG: 0.4 CAPSULE ORAL at 17:33

## 2020-09-19 RX ADMIN — PANTOPRAZOLE SODIUM 20 MG: 20 TABLET, DELAYED RELEASE ORAL at 06:05

## 2020-09-19 RX ADMIN — ACETAMINOPHEN 650 MG: 325 TABLET, FILM COATED ORAL at 20:55

## 2020-09-19 RX ADMIN — INSULIN GLARGINE 30 UNITS: 100 INJECTION, SOLUTION SUBCUTANEOUS at 20:41

## 2020-09-19 RX ADMIN — ACETAMINOPHEN 650 MG: 325 TABLET, FILM COATED ORAL at 13:33

## 2020-09-19 RX ADMIN — HEPARIN SODIUM 5000 UNITS: 5000 INJECTION INTRAVENOUS; SUBCUTANEOUS at 06:05

## 2020-09-19 RX ADMIN — CLOPIDOGREL BISULFATE 75 MG: 75 TABLET ORAL at 08:23

## 2020-09-19 RX ADMIN — ATORVASTATIN CALCIUM 40 MG: 40 TABLET, FILM COATED ORAL at 17:33

## 2020-09-19 RX ADMIN — VERAPAMIL HYDROCHLORIDE 240 MG: 120 TABLET, FILM COATED, EXTENDED RELEASE ORAL at 08:23

## 2020-09-19 RX ADMIN — INSULIN LISPRO 1 UNITS: 100 INJECTION, SOLUTION INTRAVENOUS; SUBCUTANEOUS at 20:43

## 2020-09-19 RX ADMIN — BACITRACIN, NEOMYCIN, POLYMYXIN B 1 SMALL APPLICATION: 400; 3.5; 5 OINTMENT TOPICAL at 17:33

## 2020-09-19 RX ADMIN — INSULIN LISPRO 1 UNITS: 100 INJECTION, SOLUTION INTRAVENOUS; SUBCUTANEOUS at 12:34

## 2020-09-19 RX ADMIN — DAPTOMYCIN 600 MG: 500 INJECTION, POWDER, LYOPHILIZED, FOR SOLUTION INTRAVENOUS at 12:34

## 2020-09-19 RX ADMIN — DOCUSATE SODIUM 100 MG: 100 CAPSULE, LIQUID FILLED ORAL at 08:23

## 2020-09-19 NOTE — PROGRESS NOTES
Progress Note - Jose Angel Enciso 1965, 60 y o  male MRN: [id number]    Unit/Bed#: -01 Encounter: [id number]    Primary Care Provider: Judi Bell MD   Date and time admitted to hospital: 8/28/2020  3:14 PM        * Osteomyelitis [facility])  Assessment & Plan  Recent history of right hallux amputation  Presented with wound dehiscence, wound cellulitis, possible osteomyelitis  2/2 blood culture from 08/28/2020 growing MRSA  Right foot MRI report reviewed and noted for osteomyelitis  Status post right-sided TMA    Was switched from vancomycin to  daptomycin given ATN    Currently afebrile, hemodynamically stable  Encourage for strict nonweightbearing to right lower extremity at this time  Continue dressing changes per podiatrist recommendation  Acute kidney injury (MARKELL) with acute tubular necrosis (ATN) (HCC)  Assessment & Plan  Likely secondary to sepsis/surgery,+/- urinary retention  Creatinine peaked to 2 32 on 9/5  Baseline appears to be 0 8-0 9  Creatinine currently stable an improving  nephro following recs appreciated    Patient required 3 straight catheterization,Mae inserted per protocol  C/w Flomax   Avoid nephrotoxin and hypotension  Medically stable for discharge  follow-up with Nephrology in the outpatient basis    MRSA bacteremia  Assessment & Plan  2/2 culture from 08/28/2020 growing MRSA  Likely source foot wound  Negative 2D echo  Repeat blood cultures remain negative  Continue IV daptomycin total of 4 weeks through 9/27  Weekly CBC with diff, creatinine, vancomycin level  Outpatient ID follow-up after hospital discharge  Vascular surgery, Podiatry following  ID recommendations appreciated      Urinary retention  Assessment & Plan  Required 3 straight straight catheterization,yielded 500 - 700ml urine  Mae inserted 09/8/20    Patient requesting voiding trial   Will try tomorrow    Started Flomax  Patient denies constipation  Will follow-up outpatient with Urology    Type 2 diabetes mellitus with diabetic polyneuropathy, with long-term current use of insulin [facility])  Assessment & Plan  Lab Results   Component Value Date    HGBA1C 8 1 (H) 2020       Recent Labs     20  1526 20  2158 20  0617 20  1138   POCGLU 171* 128 88 175*       Blood Sugar Average: Last 72 hrs:  · (P) 155 5625       Hemoglobin A1c 8 1  Blood sugars improving  Continue Lantus 30 units subcu HS, Humalog 15 units t i d  Continue SSI  Continue to monitor sugar        VTE Pharmacologic Prophylaxis:   Pharmacologic: Heparin  Mechanical VTE Prophylaxis in Place: Yes    Patient Centered Rounds: I have performed bedside rounds with nursing staff today  Discussions with Specialists or Other Care Team Provider:     Education and Discussions with Family / Patient:    Time Spent for Care: 20 minutes  More than 50% of total time spent on counseling and coordination of care as described above  Current Length of Stay: 22 day(s)    Current Patient Status: Inpatient   Certification Statement: The patient will continue to require additional inpatient hospital stay due to Placement    Discharge Plan:  Pending placement    Code Status: Level 1 - Full Code      Subjective:   Patient seen and examined  No acute complaints at this time  He is requesting    Objective:     Vitals:   Temp (24hrs), Av °F (36 7 °C), Min:98 °F (36 7 °C), Max:98 °F (36 7 °C)    Temp:  [98 °F (36 7 °C)] 98 °F (36 7 °C)  HR:  [80] 80  Resp:  [18] 18  BP: (153)/(97) 153/97  SpO2:  [95 %] 95 %  Body mass index is 30 89 kg/m²  Input and Output Summary (last 24 hours): Intake/Output Summary (Last 24 hours) at 2020 1502  Last data filed at 2020 1356  Gross per 24 hour   Intake 1320 ml   Output 1900 ml   Net -580 ml       Physical Exam:     Physical Exam  Vitals signs and nursing note reviewed  Constitutional:       Appearance: Normal appearance  HENT:      Head: Normocephalic and atraumatic     Neck: Musculoskeletal: Normal range of motion  Cardiovascular:      Rate and Rhythm: Normal rate and regular rhythm  Pulses: Normal pulses  Heart sounds: Normal heart sounds  Pulmonary:      Effort: Pulmonary effort is normal       Breath sounds: Normal breath sounds  Abdominal:      General: Abdomen is flat  Palpations: Abdomen is soft  Musculoskeletal: Normal range of motion  Skin:     General: Skin is warm and dry  Neurological:      General: No focal deficit present  Mental Status: He is alert and oriented to person, place, and time  Additional Data:     Labs:    Results from last 7 days   Lab Units 09/19/20  0609   WBC Thousand/uL 10 68*   HEMOGLOBIN g/dL 9 8*   HEMATOCRIT % 29 7*   PLATELETS Thousands/uL 450*   NEUTROS PCT % 60   LYMPHS PCT % 24   MONOS PCT % 10   EOS PCT % 4     Results from last 7 days   Lab Units 09/19/20  0609   SODIUM mmol/L 139   POTASSIUM mmol/L 3 5   CHLORIDE mmol/L 102   CO2 mmol/L 28   BUN mg/dL 25   CREATININE mg/dL 1 57*   ANION GAP mmol/L 9   CALCIUM mg/dL 9 1   GLUCOSE RANDOM mg/dL 87         Results from last 7 days   Lab Units 09/19/20  1138 09/19/20  0617 09/18/20  2158 09/18/20  1526 09/18/20  1127 09/18/20  0611 09/17/20  2114 09/17/20  1718 09/17/20  1116 09/17/20  0556 09/16/20  2120 09/16/20  1738   POC GLUCOSE mg/dl 175* 88 128 171* 165* 128 156* 151* 262* 150* 145* 180*                   * I Have Reviewed All Lab Data Listed Above  * Additional Pertinent Lab Tests Reviewed:  Pradeep 66 Admission Reviewed    Imaging:    Imaging Reports Reviewed Today Include:   Imaging Personally Reviewed by Myself Includes:     Recent Cultures (last 7 days):           Last 24 Hours Medication List:   Current Facility-Administered Medications   Medication Dose Route Frequency Provider Last Rate    acetaminophen  650 mg Oral Q6H PRN Bessy Broussard PA-C      aluminum-magnesium hydroxide-simethicone  30 mL Oral Q6H PRN Vernon Ponce HERMAN Murillo      aspirin  81 mg Oral Daily [place], [place]      atorvastatin  40 mg Oral Daily With [place], [place]      clopidogrel  75 mg Oral Daily [place], [place]      DAPTOmycin  6 mg/kg (Adjusted) Intravenous Q24H Fiona Aldea,  mg (09/19/20 1234)    docusate sodium  100 mg Oral BID Elfego Ann MD      heparin (porcine)  5,000 Units Subcutaneous Q8H [address] [place] NAILA Dykes      insulin glargine  30 Units Subcutaneous HS Elfego Ann MD      insulin lispro  1-5 Units Subcutaneous 4x Daily (AC & HS) [place], HERMAN      insulin lispro  15 Units Subcutaneous TID With Meals [place], HERMAN      LORazepam  0 5 mg Oral BID PRN [place], MATTM      methimazole  5 mg Oral Daily [place], [place]      neomycin-bacitracin-polymyxin b  1 small application Topical BID NAILA Villar      ondansetron  4 mg Intravenous Q6H PRN [place], DPM      oxyCODONE  10 mg Oral Q6H PRN Elfego Ann MD      oxyCODONE  5 mg Oral Q6H PRN Elfego Ann MD      pantoprazole  20 mg Oral Early Morning [place], DPM      polyethylene glycol  17 g Oral Daily PRN [place], DPM      tamsulosin  0 4 mg Oral Daily With Emily Hayes MD      verapamil  240 mg Oral Daily [place], DPM      zolpidem  10 mg Oral HS PRN [place], DPM          Today, Patient Was Seen By: Elfego Ann MD    ** Please Note: Dictation voice to text software may have been used in the creation of this document   **

## 2020-09-19 NOTE — ASSESSMENT & PLAN NOTE
Required 3 straight straight catheterization,yielded 500 - 700ml urine  Mae inserted 09/8/20    Patient requesting voiding trial   Will try tomorrow    Started Flomax  Patient denies constipation  Will follow-up outpatient with Urology

## 2020-09-19 NOTE — PLAN OF CARE
Problem: Potential for Falls  Goal: Patient will remain free of falls  Description: INTERVENTIONS:  - Assess patient frequently for physical needs  -  Identify cognitive and physical deficits and behaviors that affect risk of falls    -  [place] fall precautions as indicated by assessment   - Educate patient/family on patient safety including physical limitations  - Instruct patient to call for assistance with activity based on assessment  - Modify environment to reduce risk of injury  - Consider OT/PT consult to assist with strengthening/mobility  Outcome: Progressing     Problem: PAIN - ADULT  Goal: Verbalizes/displays adequate comfort level or baseline comfort level  Description: Interventions:  - Encourage patient to monitor pain and request assistance  - Assess pain using appropriate pain scale  - Administer analgesics based on type and severity of pain and evaluate response  - Implement non-pharmacological measures as appropriate and evaluate response  - Consider cultural and social influences on pain and pain management  - Notify physician/advanced practitioner if interventions unsuccessful or patient reports new pain  Outcome: Progressing     Problem: INFECTION - ADULT  Goal: Absence or prevention of progression during hospitalization  Description: INTERVENTIONS:  - Assess and monitor for signs and symptoms of infection  - Monitor lab/diagnostic results  - Monitor all insertion sites, i e  indwelling lines, tubes, and drains  - Monitor endotracheal if appropriate and nasal secretions for changes in amount and color  - [place] appropriate cooling/warming therapies per order  - Administer medications as ordered  - Instruct and encourage patient and family to use good hand hygiene technique  - Identify and instruct in appropriate isolation precautions for identified infection/condition  Outcome: Progressing  Goal: Absence of fever/infection during neutropenic period  Description: INTERVENTIONS:  - Monitor WBC    Outcome: Progressing     Problem: SAFETY ADULT  Goal: Patient will remain free of falls  Description: INTERVENTIONS:  - Assess patient frequently for physical needs  -  Identify cognitive and physical deficits and behaviors that affect risk of falls    -  [place] fall precautions as indicated by assessment   - Educate patient/family on patient safety including physical limitations  - Instruct patient to call for assistance with activity based on assessment  - Modify environment to reduce risk of injury  - Consider OT/PT consult to assist with strengthening/mobility  Outcome: Progressing  Goal: Maintain or return to baseline ADL function  Description: INTERVENTIONS:  -  Assess patient's ability to carry out ADLs; assess patient's baseline for ADL function and identify physical deficits which impact ability to perform ADLs (bathing, care of mouth/teeth, toileting, grooming, dressing, etc )  - Assess/evaluate cause of self-care deficits   - Assess range of motion  - Assess patient's mobility; develop plan if impaired  - Assess patient's need for assistive devices and provide as appropriate  - Encourage maximum independence but intervene and supervise when necessary  - Involve family in performance of ADLs  - Assess for home care needs following discharge   - Consider OT consult to assist with ADL evaluation and planning for discharge  - Provide patient education as appropriate  Outcome: Progressing  Goal: Maintain or return mobility status to optimal level  Description: INTERVENTIONS:  - Assess patient's baseline mobility status (ambulation, transfers, stairs, etc )    - Identify cognitive and physical deficits and behaviors that affect mobility  - Identify mobility aids required to assist with transfers and/or ambulation (gait belt, sit-to-stand, lift, walker, cane, etc )  - [place] fall precautions as indicated by assessment  - Record patient progress and toleration of activity level on Mobility SBAR; progress patient to next Phase/Stage  - Instruct patient to call for assistance with activity based on assessment  - Consider rehabilitation consult to assist with strengthening/weightbearing, etc   Outcome: Progressing     Problem: DISCHARGE PLANNING  Goal: Discharge to home or other facility with appropriate resources  Description: INTERVENTIONS:  - Identify barriers to discharge w/patient and caregiver  - Arrange for needed discharge resources and transportation as appropriate  - Identify discharge learning needs (meds, wound care, etc )  - Arrange for interpretive services to assist at discharge as needed  - Refer to Case Management Department for coordinating discharge planning if the patient needs post-hospital services based on physician/advanced practitioner order or complex needs related to functional status, cognitive ability, or social support system  Outcome: Progressing     Problem: Knowledge Deficit  Goal: Patient/family/caregiver demonstrates understanding of disease process, treatment plan, medications, and discharge instructions  Description: Complete learning assessment and assess knowledge base  Interventions:  - Provide teaching at level of understanding  - Provide teaching via preferred learning methods  Outcome: Progressing     Problem: Nutrition/Hydration-ADULT  Goal: Nutrient/Hydration intake appropriate for improving, restoring or maintaining nutritional needs  Description: Monitor and assess patient's nutrition/hydration status for malnutrition  Collaborate with interdisciplinary team and initiate plan and interventions as ordered  Monitor patient's weight and dietary intake as ordered or per policy  Utilize nutrition screening tool and intervene as necessary  Determine patient's food preferences and provide high-protein, high-caloric foods as appropriate       INTERVENTIONS:  - Monitor oral intake, urinary output, labs, and treatment plans  - Assess nutrition and hydration status and recommend course of action  - Evaluate amount of meals eaten  - Assist patient with eating if necessary   - Allow adequate time for meals  - Recommend/ encourage appropriate diets, oral nutritional supplements, and vitamin/mineral supplements  - Order, calculate, and assess calorie counts as needed  - Recommend, monitor, and adjust tube feedings and TPN/PPN based on assessed needs  - Assess need for intravenous fluids  - Provide specific nutrition/hydration education as appropriate  - Include patient/family/caregiver in decisions related to nutrition  Outcome: Progressing     Problem: Prexisting or High Potential for Compromised Skin Integrity  Goal: Skin integrity is maintained or improved  Description: INTERVENTIONS:  - Identify patients at risk for skin breakdown  - Assess and monitor skin integrity  - Assess and monitor nutrition and hydration status  - Monitor labs   - Assess for incontinence   - Turn and reposition patient  - Assist with mobility/ambulation  - Relieve pressure over bony prominences  - Avoid friction and shearing  - Provide appropriate hygiene as needed including keeping skin clean and dry  - Evaluate need for skin moisturizer/barrier cream  - Collaborate with interdisciplinary team   - Patient/family teaching  - Consider wound care consult   Outcome: Progressing

## 2020-09-19 NOTE — PLAN OF CARE
Problem: Potential for Falls  Goal: Patient will remain free of falls  Description: INTERVENTIONS:  - Assess patient frequently for physical needs  -  Identify cognitive and physical deficits and behaviors that affect risk of falls    -  [place] fall precautions as indicated by assessment   - Educate patient/family on patient safety including physical limitations  - Instruct patient to call for assistance with activity based on assessment  - Modify environment to reduce risk of injury  - Consider OT/PT consult to assist with strengthening/mobility  Outcome: Progressing     Problem: PAIN - ADULT  Goal: Verbalizes/displays adequate comfort level or baseline comfort level  Description: Interventions:  - Encourage patient to monitor pain and request assistance  - Assess pain using appropriate pain scale  - Administer analgesics based on type and severity of pain and evaluate response  - Implement non-pharmacological measures as appropriate and evaluate response  - Consider cultural and social influences on pain and pain management  - Notify physician/advanced practitioner if interventions unsuccessful or patient reports new pain  Outcome: Progressing     Problem: INFECTION - ADULT  Goal: Absence or prevention of progression during hospitalization  Description: INTERVENTIONS:  - Assess and monitor for signs and symptoms of infection  - Monitor lab/diagnostic results  - Monitor all insertion sites, i e  indwelling lines, tubes, and drains  - Monitor endotracheal if appropriate and nasal secretions for changes in amount and color  - [place] appropriate cooling/warming therapies per order  - Administer medications as ordered  - Instruct and encourage patient and family to use good hand hygiene technique  - Identify and instruct in appropriate isolation precautions for identified infection/condition  Outcome: Progressing  Goal: Absence of fever/infection during neutropenic period  Description: INTERVENTIONS:  - Monitor WBC    Outcome: Progressing     Problem: SAFETY ADULT  Goal: Patient will remain free of falls  Description: INTERVENTIONS:  - Assess patient frequently for physical needs  -  Identify cognitive and physical deficits and behaviors that affect risk of falls    -  [place] fall precautions as indicated by assessment   - Educate patient/family on patient safety including physical limitations  - Instruct patient to call for assistance with activity based on assessment  - Modify environment to reduce risk of injury  - Consider OT/PT consult to assist with strengthening/mobility  Outcome: Progressing  Goal: Maintain or return to baseline ADL function  Description: INTERVENTIONS:  -  Assess patient's ability to carry out ADLs; assess patient's baseline for ADL function and identify physical deficits which impact ability to perform ADLs (bathing, care of mouth/teeth, toileting, grooming, dressing, etc )  - Assess/evaluate cause of self-care deficits   - Assess range of motion  - Assess patient's mobility; develop plan if impaired  - Assess patient's need for assistive devices and provide as appropriate  - Encourage maximum independence but intervene and supervise when necessary  - Involve family in performance of ADLs  - Assess for home care needs following discharge   - Consider OT consult to assist with ADL evaluation and planning for discharge  - Provide patient education as appropriate  Outcome: Progressing  Goal: Maintain or return mobility status to optimal level  Description: INTERVENTIONS:  - Assess patient's baseline mobility status (ambulation, transfers, stairs, etc )    - Identify cognitive and physical deficits and behaviors that affect mobility  - Identify mobility aids required to assist with transfers and/or ambulation (gait belt, sit-to-stand, lift, walker, cane, etc )  - [place] fall precautions as indicated by assessment  - Record patient progress and toleration of activity level on Mobility SBAR; progress patient to next Phase/Stage  - Instruct patient to call for assistance with activity based on assessment  - Consider rehabilitation consult to assist with strengthening/weightbearing, etc   Outcome: Progressing     Problem: DISCHARGE PLANNING  Goal: Discharge to home or other facility with appropriate resources  Description: INTERVENTIONS:  - Identify barriers to discharge w/patient and caregiver  - Arrange for needed discharge resources and transportation as appropriate  - Identify discharge learning needs (meds, wound care, etc )  - Arrange for interpretive services to assist at discharge as needed  - Refer to Case Management Department for coordinating discharge planning if the patient needs post-hospital services based on physician/advanced practitioner order or complex needs related to functional status, cognitive ability, or social support system  Outcome: Progressing     Problem: Knowledge Deficit  Goal: Patient/family/caregiver demonstrates understanding of disease process, treatment plan, medications, and discharge instructions  Description: Complete learning assessment and assess knowledge base  Interventions:  - Provide teaching at level of understanding  - Provide teaching via preferred learning methods  Outcome: Progressing     Problem: Nutrition/Hydration-ADULT  Goal: Nutrient/Hydration intake appropriate for improving, restoring or maintaining nutritional needs  Description: Monitor and assess patient's nutrition/hydration status for malnutrition  Collaborate with interdisciplinary team and initiate plan and interventions as ordered  Monitor patient's weight and dietary intake as ordered or per policy  Utilize nutrition screening tool and intervene as necessary  Determine patient's food preferences and provide high-protein, high-caloric foods as appropriate       INTERVENTIONS:  - Monitor oral intake, urinary output, labs, and treatment plans  - Assess nutrition and hydration status and recommend course of action  - Evaluate amount of meals eaten  - Assist patient with eating if necessary   - Allow adequate time for meals  - Recommend/ encourage appropriate diets, oral nutritional supplements, and vitamin/mineral supplements  - Order, calculate, and assess calorie counts as needed  - Recommend, monitor, and adjust tube feedings and TPN/PPN based on assessed needs  - Assess need for intravenous fluids  - Provide specific nutrition/hydration education as appropriate  - Include patient/family/caregiver in decisions related to nutrition  Outcome: Progressing     Problem: Prexisting or High Potential for Compromised Skin Integrity  Goal: Skin integrity is maintained or improved  Description: INTERVENTIONS:  - Identify patients at risk for skin breakdown  - Assess and monitor skin integrity  - Assess and monitor nutrition and hydration status  - Monitor labs   - Assess for incontinence   - Turn and reposition patient  - Assist with mobility/ambulation  - Relieve pressure over bony prominences  - Avoid friction and shearing  - Provide appropriate hygiene as needed including keeping skin clean and dry  - Evaluate need for skin moisturizer/barrier cream  - Collaborate with interdisciplinary team   - Patient/family teaching  - Consider wound care consult   Outcome: Progressing

## 2020-09-19 NOTE — ASSESSMENT & PLAN NOTE
Lab Results   Component Value Date    HGBA1C 8 1 (H) 08/28/2020       Recent Labs     09/18/20  1526 09/18/20  2158 09/19/20  0617 09/19/20  1138   POCGLU 171* 128 88 175*       Blood Sugar Average: Last 72 hrs:  · (P) 155 5625       Hemoglobin A1c 8 1  Blood sugars improving  Continue Lantus 30 units subcu HS, Humalog 15 units t i d    Continue SSI  Continue to monitor sugar

## 2020-09-20 LAB
GLUCOSE SERPL-MCNC: 121 MG/DL (ref 65–140)
GLUCOSE SERPL-MCNC: 139 MG/DL (ref 65–140)
GLUCOSE SERPL-MCNC: 194 MG/DL (ref 65–140)
GLUCOSE SERPL-MCNC: 276 MG/DL (ref 65–140)

## 2020-09-20 PROCEDURE — 99232 SBSQ HOSP IP/OBS MODERATE 35: CPT | Performed by: INTERNAL MEDICINE

## 2020-09-20 PROCEDURE — 82948 REAGENT STRIP/BLOOD GLUCOSE: CPT

## 2020-09-20 RX ADMIN — DOCUSATE SODIUM 100 MG: 100 CAPSULE, LIQUID FILLED ORAL at 17:06

## 2020-09-20 RX ADMIN — DOCUSATE SODIUM 100 MG: 100 CAPSULE, LIQUID FILLED ORAL at 09:02

## 2020-09-20 RX ADMIN — INSULIN LISPRO 15 UNITS: 100 INJECTION, SOLUTION INTRAVENOUS; SUBCUTANEOUS at 09:03

## 2020-09-20 RX ADMIN — HEPARIN SODIUM 5000 UNITS: 5000 INJECTION INTRAVENOUS; SUBCUTANEOUS at 06:49

## 2020-09-20 RX ADMIN — INSULIN GLARGINE 30 UNITS: 100 INJECTION, SOLUTION SUBCUTANEOUS at 21:23

## 2020-09-20 RX ADMIN — ATORVASTATIN CALCIUM 40 MG: 40 TABLET, FILM COATED ORAL at 17:06

## 2020-09-20 RX ADMIN — HEPARIN SODIUM 5000 UNITS: 5000 INJECTION INTRAVENOUS; SUBCUTANEOUS at 21:23

## 2020-09-20 RX ADMIN — INSULIN LISPRO 1 UNITS: 100 INJECTION, SOLUTION INTRAVENOUS; SUBCUTANEOUS at 18:09

## 2020-09-20 RX ADMIN — TAMSULOSIN HYDROCHLORIDE 0.4 MG: 0.4 CAPSULE ORAL at 17:06

## 2020-09-20 RX ADMIN — ACETAMINOPHEN 650 MG: 325 TABLET, FILM COATED ORAL at 09:02

## 2020-09-20 RX ADMIN — INSULIN LISPRO 3 UNITS: 100 INJECTION, SOLUTION INTRAVENOUS; SUBCUTANEOUS at 12:13

## 2020-09-20 RX ADMIN — BACITRACIN, NEOMYCIN, POLYMYXIN B 1 SMALL APPLICATION: 400; 3.5; 5 OINTMENT TOPICAL at 09:02

## 2020-09-20 RX ADMIN — PANTOPRAZOLE SODIUM 20 MG: 20 TABLET, DELAYED RELEASE ORAL at 06:52

## 2020-09-20 RX ADMIN — CLOPIDOGREL BISULFATE 75 MG: 75 TABLET ORAL at 09:02

## 2020-09-20 RX ADMIN — ASPIRIN 81 MG 81 MG: 81 TABLET ORAL at 09:02

## 2020-09-20 RX ADMIN — DAPTOMYCIN 600 MG: 500 INJECTION, POWDER, LYOPHILIZED, FOR SOLUTION INTRAVENOUS at 12:13

## 2020-09-20 RX ADMIN — METHIMAZOLE 5 MG: 5 TABLET ORAL at 09:02

## 2020-09-20 RX ADMIN — INSULIN LISPRO 15 UNITS: 100 INJECTION, SOLUTION INTRAVENOUS; SUBCUTANEOUS at 12:14

## 2020-09-20 RX ADMIN — VERAPAMIL HYDROCHLORIDE 240 MG: 120 TABLET, FILM COATED, EXTENDED RELEASE ORAL at 09:02

## 2020-09-20 RX ADMIN — BACITRACIN, NEOMYCIN, POLYMYXIN B 1 SMALL APPLICATION: 400; 3.5; 5 OINTMENT TOPICAL at 17:07

## 2020-09-20 RX ADMIN — INSULIN LISPRO 15 UNITS: 100 INJECTION, SOLUTION INTRAVENOUS; SUBCUTANEOUS at 18:09

## 2020-09-20 NOTE — PROGRESS NOTES
Progress Note - Roma Soto 1965, 60 y o  male MRN: [id number]    Unit/Bed#: -01 Encounter: [id number]    Primary Care Provider: Trev Wang MD   Date and time admitted to hospital: 8/28/2020  3:14 PM        * Osteomyelitis [facility])  Assessment & Plan  Recent history of right hallux amputation  Presented with wound dehiscence, wound cellulitis, possible osteomyelitis  2/2 blood culture from 08/28/2020 growing MRSA  Right foot MRI report reviewed and noted for osteomyelitis  Status post right-sided TMA    Was switched from vancomycin to  daptomycin given ATN    Currently afebrile, hemodynamically stable  Encourage for strict nonweightbearing to right lower extremity at this time  Continue dressing changes per podiatrist recommendation  MRSA bacteremia  Assessment & Plan  2/2 culture from 08/28/2020 growing MRSA  Likely source foot wound  Negative 2D echo  Repeat blood cultures remain negative  Continue IV daptomycin total of 4 weeks through 9/27  Weekly CBC with diff, creatinine, cpk  Outpatient ID follow-up after hospital discharge  Vascular surgery, Podiatry following  ID recommendations appreciated      Urinary retention  Assessment & Plan  Required 3 straight straight catheterization,yielded 500 - 700ml urine  Mae inserted 09/8/20    Patient requesting about removing Mae catheter today  Will do a voiding trial  Urology following  Continue Flomax    Amputation of right great toe [facility])  Assessment & Plan  Now presented again with poor wound healing, wound dehiscence, possible cellulitis, osteomyelitis  Plan is as stated above under 1  And 2       Type 2 diabetes mellitus with diabetic polyneuropathy, with long-term current use of insulin [facility])  Assessment & Plan  Lab Results   Component Value Date    HGBA1C 8 1 (H) 08/28/2020       Recent Labs     09/19/20  1138 09/19/20  1732 09/19/20  2040 09/20/20  0649   POCGLU 175* 151* 188* 139       Blood Sugar Average: Last 72 hrs:  · (P) 872 [id number]       Hemoglobin A1c 8 1  Blood sugars improving  Continue Lantus 30 units subcu HS, Humalog 15 units t i d  Continue SSI  Continue to monitor sugar        Gastroesophageal reflux disease without esophagitis  Assessment & Plan  · Continue PPI    Hypertension, essential  Assessment & Plan  · Controlled  · Continue verapamil  Avalide held secondary to acute kidney injury  · Continue to monitor blood pressure closely  · BP stable        VTE Pharmacologic Prophylaxis:   Pharmacologic: Heparin  Mechanical VTE Prophylaxis in Place: Yes    Patient Centered Rounds: I have performed bedside rounds with nursing staff today  Discussions with Specialists or Other Care Team Provider:     Education and Discussions with Family / Patient:    Time Spent for Care: 20 minutes  More than 50% of total time spent on counseling and coordination of care as described above  Current Length of Stay: 23 day(s)    Current Patient Status: Inpatient   Certification Statement: The patient will continue to require additional inpatient hospital stay due to Above medical problems    Discharge Plan:  Continue hospitalization pending placement to rehab    Code Status: Level 1 - Full Code      Subjective:   Patient seen and examined  No acute complaints at this time  Feels good    Objective:     Vitals:   Temp (24hrs), Av 6 °F (36 4 °C), Min:97 6 °F (36 4 °C), Max:97 6 °F (36 4 °C)    Temp:  [97 6 °F (36 4 °C)] 97 6 °F (36 4 °C)  HR:  [77] 77  Resp:  [17] 17  BP: (144)/(91) 144/91  SpO2:  [96 %] 96 %  Body mass index is 30 89 kg/m²  Input and Output Summary (last 24 hours): Intake/Output Summary (Last 24 hours) at 2020 0910  Last data filed at 2020 0903  Gross per 24 hour   Intake 960 ml   Output 2350 ml   Net -1390 ml       Physical Exam:     Physical Exam  Vitals signs reviewed  Constitutional:       Appearance: Normal appearance     Neck:      Musculoskeletal: Normal range of motion  Cardiovascular:      Rate and Rhythm: Normal rate and regular rhythm  Pulses: Normal pulses  Pulmonary:      Effort: Pulmonary effort is normal       Breath sounds: Normal breath sounds  Abdominal:      General: Abdomen is flat  Palpations: Abdomen is soft  Musculoskeletal: Normal range of motion  Skin:     General: Skin is warm  Neurological:      Mental Status: He is alert  Additional Data:     Labs:    Results from last 7 days   Lab Units 09/19/20  0609   WBC Thousand/uL 10 68*   HEMOGLOBIN g/dL 9 8*   HEMATOCRIT % 29 7*   PLATELETS Thousands/uL 450*   NEUTROS PCT % 60   LYMPHS PCT % 24   MONOS PCT % 10   EOS PCT % 4     Results from last 7 days   Lab Units 09/19/20  0609   SODIUM mmol/L 139   POTASSIUM mmol/L 3 5   CHLORIDE mmol/L 102   CO2 mmol/L 28   BUN mg/dL 25   CREATININE mg/dL 1 57*   ANION GAP mmol/L 9   CALCIUM mg/dL 9 1   GLUCOSE RANDOM mg/dL 87         Results from last 7 days   Lab Units 09/20/20  0649 09/19/20  2040 09/19/20  1732 09/19/20  1138 09/19/20  0617 09/18/20  2158 09/18/20  1526 09/18/20  1127 09/18/20  0611 09/17/20  2114 09/17/20  1718 09/17/20  1116   POC GLUCOSE mg/dl 139 188* 151* 175* 88 128 171* 165* 128 156* 151* 262*                   * I Have Reviewed All Lab Data Listed Above  * Additional Pertinent Lab Tests Reviewed:  Pradeep 66 Admission Reviewed    Imaging:    Imaging Reports Reviewed Today Include:   Imaging Personally Reviewed by Myself Includes:     Recent Cultures (last 7 days):           Last 24 Hours Medication List:   Current Facility-Administered Medications   Medication Dose Route Frequency Provider Last Rate    acetaminophen  650 mg Oral Q6H PRN Fede Del Rio PA-C      aluminum-magnesium hydroxide-simethicone  30 mL Oral Q6H PRN Angelica Peng DPM      aspirin  81 mg Oral Daily Destin Jacobson      atorvastatin  40 mg Oral Daily With [employer], DPMIKE      clopidogrel  75 mg Oral Daily Brianna Lindquist DPM      DAPTOmycin  6 mg/kg (Adjusted) Intravenous Q24H Fiona Aldea,  mg (09/19/20 1234)    docusate sodium  100 mg Oral BID Hailey Ge MD      heparin (porcine)  5,000 Units Subcutaneous Q8H [address] ReniNAILA Jean      insulin glargine  30 Units Subcutaneous HS Hailey Ge MD      insulin lispro  1-5 Units Subcutaneous 4x Daily (AC & HS) Brianna Lindquist DPM      insulin lispro  15 Units Subcutaneous TID With Meals Brianna Lindquist DPM      LORazepam  0 5 mg Oral BID PRN Brianna Lindquist DPM      methimazole  5 mg Oral Daily Destin Henderson      neomycin-bacitracin-polymyxin b  1 small application Topical BID NAILA Rodriguez      ondansetron  4 mg Intravenous Q6H PRN Brianna Lindquist DPM      oxyCODONE  10 mg Oral Q6H PRN Hailey Ge MD      oxyCODONE  5 mg Oral Q6H PRN Hailey Ge MD      pantoprazole  20 mg Oral Early Morning Brianna Lindquist DPM      polyethylene glycol  17 g Oral Daily PRN Brianna Lindquist DPM      tamsulosin  0 4 mg Oral Daily With Lucinda Garrett MD      verapamil  240 mg Oral Daily Brianna Lindquist DPM      zolpidem  10 mg Oral HS PRN Brianna Lindquist DPM          Today, Patient Was Seen By: Hailey Ge MD    ** Please Note: Dictation voice to text software may have been used in the creation of this document   **

## 2020-09-20 NOTE — ASSESSMENT & PLAN NOTE
Lab Results   Component Value Date    HGBA1C 8 1 (H) 08/28/2020       Recent Labs     09/19/20  1138 09/19/20  1732 09/19/20 2040 09/20/20  0649   POCGLU 175* 151* 188* 139       Blood Sugar Average: Last 72 hrs:  · (P) 876 [id number]       Hemoglobin A1c 8 1  Blood sugars improving  Continue Lantus 30 units subcu HS, Humalog 15 units t i d    Continue SSI  Continue to monitor sugar

## 2020-09-20 NOTE — ASSESSMENT & PLAN NOTE
Required 3 straight straight catheterization,yielded 500 - 700ml urine  Mae inserted 09/8/20    Patient requesting about removing Mae catheter today    Will do a voiding trial  Urology following  Continue Flomax

## 2020-09-20 NOTE — PLAN OF CARE
Problem: Potential for Falls  Goal: Patient will remain free of falls  Description: INTERVENTIONS:  - Assess patient frequently for physical needs  -  Identify cognitive and physical deficits and behaviors that affect risk of falls    -  [place] fall precautions as indicated by assessment   - Educate patient/family on patient safety including physical limitations  - Instruct patient to call for assistance with activity based on assessment  - Modify environment to reduce risk of injury  - Consider OT/PT consult to assist with strengthening/mobility  Outcome: Progressing     Problem: PAIN - ADULT  Goal: Verbalizes/displays adequate comfort level or baseline comfort level  Description: Interventions:  - Encourage patient to monitor pain and request assistance  - Assess pain using appropriate pain scale  - Administer analgesics based on type and severity of pain and evaluate response  - Implement non-pharmacological measures as appropriate and evaluate response  - Consider cultural and social influences on pain and pain management  - Notify physician/advanced practitioner if interventions unsuccessful or patient reports new pain  Outcome: Progressing     Problem: INFECTION - ADULT  Goal: Absence or prevention of progression during hospitalization  Description: INTERVENTIONS:  - Assess and monitor for signs and symptoms of infection  - Monitor lab/diagnostic results  - Monitor all insertion sites, i e  indwelling lines, tubes, and drains  - Monitor endotracheal if appropriate and nasal secretions for changes in amount and color  - [place] appropriate cooling/warming therapies per order  - Administer medications as ordered  - Instruct and encourage patient and family to use good hand hygiene technique  - Identify and instruct in appropriate isolation precautions for identified infection/condition  Outcome: Progressing  Goal: Absence of fever/infection during neutropenic period  Description: INTERVENTIONS:  - Monitor WBC    Outcome: Progressing     Problem: SAFETY ADULT  Goal: Patient will remain free of falls  Description: INTERVENTIONS:  - Assess patient frequently for physical needs  -  Identify cognitive and physical deficits and behaviors that affect risk of falls    -  [place] fall precautions as indicated by assessment   - Educate patient/family on patient safety including physical limitations  - Instruct patient to call for assistance with activity based on assessment  - Modify environment to reduce risk of injury  - Consider OT/PT consult to assist with strengthening/mobility  Outcome: Progressing  Goal: Maintain or return to baseline ADL function  Description: INTERVENTIONS:  -  Assess patient's ability to carry out ADLs; assess patient's baseline for ADL function and identify physical deficits which impact ability to perform ADLs (bathing, care of mouth/teeth, toileting, grooming, dressing, etc )  - Assess/evaluate cause of self-care deficits   - Assess range of motion  - Assess patient's mobility; develop plan if impaired  - Assess patient's need for assistive devices and provide as appropriate  - Encourage maximum independence but intervene and supervise when necessary  - Involve family in performance of ADLs  - Assess for home care needs following discharge   - Consider OT consult to assist with ADL evaluation and planning for discharge  - Provide patient education as appropriate  Outcome: Progressing  Goal: Maintain or return mobility status to optimal level  Description: INTERVENTIONS:  - Assess patient's baseline mobility status (ambulation, transfers, stairs, etc )    - Identify cognitive and physical deficits and behaviors that affect mobility  - Identify mobility aids required to assist with transfers and/or ambulation (gait belt, sit-to-stand, lift, walker, cane, etc )  - [place] fall precautions as indicated by assessment  - Record patient progress and toleration of activity level on Mobility SBAR; progress patient to next Phase/Stage  - Instruct patient to call for assistance with activity based on assessment  - Consider rehabilitation consult to assist with strengthening/weightbearing, etc   Outcome: Progressing     Problem: DISCHARGE PLANNING  Goal: Discharge to home or other facility with appropriate resources  Description: INTERVENTIONS:  - Identify barriers to discharge w/patient and caregiver  - Arrange for needed discharge resources and transportation as appropriate  - Identify discharge learning needs (meds, wound care, etc )  - Arrange for interpretive services to assist at discharge as needed  - Refer to Case Management Department for coordinating discharge planning if the patient needs post-hospital services based on physician/advanced practitioner order or complex needs related to functional status, cognitive ability, or social support system  Outcome: Progressing     Problem: Knowledge Deficit  Goal: Patient/family/caregiver demonstrates understanding of disease process, treatment plan, medications, and discharge instructions  Description: Complete learning assessment and assess knowledge base  Interventions:  - Provide teaching at level of understanding  - Provide teaching via preferred learning methods  Outcome: Progressing     Problem: Nutrition/Hydration-ADULT  Goal: Nutrient/Hydration intake appropriate for improving, restoring or maintaining nutritional needs  Description: Monitor and assess patient's nutrition/hydration status for malnutrition  Collaborate with interdisciplinary team and initiate plan and interventions as ordered  Monitor patient's weight and dietary intake as ordered or per policy  Utilize nutrition screening tool and intervene as necessary  Determine patient's food preferences and provide high-protein, high-caloric foods as appropriate       INTERVENTIONS:  - Monitor oral intake, urinary output, labs, and treatment plans  - Assess nutrition and hydration status and recommend course of action  - Evaluate amount of meals eaten  - Assist patient with eating if necessary   - Allow adequate time for meals  - Recommend/ encourage appropriate diets, oral nutritional supplements, and vitamin/mineral supplements  - Order, calculate, and assess calorie counts as needed  - Recommend, monitor, and adjust tube feedings and TPN/PPN based on assessed needs  - Assess need for intravenous fluids  - Provide specific nutrition/hydration education as appropriate  - Include patient/family/caregiver in decisions related to nutrition  Outcome: Progressing     Problem: Prexisting or High Potential for Compromised Skin Integrity  Goal: Skin integrity is maintained or improved  Description: INTERVENTIONS:  - Identify patients at risk for skin breakdown  - Assess and monitor skin integrity  - Assess and monitor nutrition and hydration status  - Monitor labs   - Assess for incontinence   - Turn and reposition patient  - Assist with mobility/ambulation  - Relieve pressure over bony prominences  - Avoid friction and shearing  - Provide appropriate hygiene as needed including keeping skin clean and dry  - Evaluate need for skin moisturizer/barrier cream  - Collaborate with interdisciplinary team   - Patient/family teaching  - Consider wound care consult   Outcome: Progressing

## 2020-09-20 NOTE — PLAN OF CARE
Problem: Potential for Falls  Goal: Patient will remain free of falls  Description: INTERVENTIONS:  - Assess patient frequently for physical needs  -  Identify cognitive and physical deficits and behaviors that affect risk of falls    -  [place] fall precautions as indicated by assessment   - Educate patient/family on patient safety including physical limitations  - Instruct patient to call for assistance with activity based on assessment  - Modify environment to reduce risk of injury  - Consider OT/PT consult to assist with strengthening/mobility  9/20/2020 0022 by Samson Rain RN  Outcome: Progressing  9/20/2020 0021 by Samson Rain RN  Outcome: Progressing     Problem: PAIN - ADULT  Goal: Verbalizes/displays adequate comfort level or baseline comfort level  Description: Interventions:  - Encourage patient to monitor pain and request assistance  - Assess pain using appropriate pain scale  - Administer analgesics based on type and severity of pain and evaluate response  - Implement non-pharmacological measures as appropriate and evaluate response  - Consider cultural and social influences on pain and pain management  - Notify physician/advanced practitioner if interventions unsuccessful or patient reports new pain  9/20/2020 0022 by Samson Rain RN  Outcome: Progressing  9/20/2020 0021 by Samson Rain RN  Outcome: Progressing     Problem: INFECTION - ADULT  Goal: Absence or prevention of progression during hospitalization  Description: INTERVENTIONS:  - Assess and monitor for signs and symptoms of infection  - Monitor lab/diagnostic results  - Monitor all insertion sites, i e  indwelling lines, tubes, and drains  - Monitor endotracheal if appropriate and nasal secretions for changes in amount and color  - [place] appropriate cooling/warming therapies per order  - Administer medications as ordered  - Instruct and encourage patient and family to use good hand hygiene technique  - Identify and instruct in appropriate isolation precautions for identified infection/condition  9/20/2020 0022 by Chin Diallo RN  Outcome: Progressing  9/20/2020 0021 by Chin Diallo RN  Outcome: Progressing  Goal: Absence of fever/infection during neutropenic period  Description: INTERVENTIONS:  - Monitor WBC    9/20/2020 0022 by Chin Diallo RN  Outcome: Progressing  9/20/2020 0021 by Chin Diallo RN  Outcome: Progressing     Problem: SAFETY ADULT  Goal: Patient will remain free of falls  Description: INTERVENTIONS:  - Assess patient frequently for physical needs  -  Identify cognitive and physical deficits and behaviors that affect risk of falls    -  [place] fall precautions as indicated by assessment   - Educate patient/family on patient safety including physical limitations  - Instruct patient to call for assistance with activity based on assessment  - Modify environment to reduce risk of injury  - Consider OT/PT consult to assist with strengthening/mobility  9/20/2020 0022 by Chin Diallo RN  Outcome: Progressing  9/20/2020 0021 by Chin Diallo RN  Outcome: Progressing  Goal: Maintain or return to baseline ADL function  Description: INTERVENTIONS:  -  Assess patient's ability to carry out ADLs; assess patient's baseline for ADL function and identify physical deficits which impact ability to perform ADLs (bathing, care of mouth/teeth, toileting, grooming, dressing, etc )  - Assess/evaluate cause of self-care deficits   - Assess range of motion  - Assess patient's mobility; develop plan if impaired  - Assess patient's need for assistive devices and provide as appropriate  - Encourage maximum independence but intervene and supervise when necessary  - Involve family in performance of ADLs  - Assess for home care needs following discharge   - Consider OT consult to assist with ADL evaluation and planning for discharge  - Provide patient education as appropriate  9/20/2020 0022 by Amos Squires RN  Outcome: Progressing  9/20/2020 0021 by Amos Squires RN  Outcome: Progressing  Goal: Maintain or return mobility status to optimal level  Description: INTERVENTIONS:  - Assess patient's baseline mobility status (ambulation, transfers, stairs, etc )    - Identify cognitive and physical deficits and behaviors that affect mobility  - Identify mobility aids required to assist with transfers and/or ambulation (gait belt, sit-to-stand, lift, walker, cane, etc )  - [place] fall precautions as indicated by assessment  - Record patient progress and toleration of activity level on Mobility SBAR; progress patient to next Phase/Stage  - Instruct patient to call for assistance with activity based on assessment  - Consider rehabilitation consult to assist with strengthening/weightbearing, etc   9/20/2020 0022 by Amos Squires RN  Outcome: Progressing  9/20/2020 0021 by Amos Squires RN  Outcome: Progressing     Problem: DISCHARGE PLANNING  Goal: Discharge to home or other facility with appropriate resources  Description: INTERVENTIONS:  - Identify barriers to discharge w/patient and caregiver  - Arrange for needed discharge resources and transportation as appropriate  - Identify discharge learning needs (meds, wound care, etc )  - Arrange for interpretive services to assist at discharge as needed  - Refer to Case Management Department for coordinating discharge planning if the patient needs post-hospital services based on physician/advanced practitioner order or complex needs related to functional status, cognitive ability, or social support system  9/20/2020 0022 by Amos Squires RN  Outcome: Progressing  9/20/2020 0021 by Amos Squires RN  Outcome: Progressing     Problem: Knowledge Deficit  Goal: Patient/family/caregiver demonstrates understanding of disease process, treatment plan, medications, and discharge instructions  Description: Complete learning assessment and assess knowledge base  Interventions:  - Provide teaching at level of understanding  - Provide teaching via preferred learning methods  9/20/2020 0022 by Gemma Spence RN  Outcome: Progressing  9/20/2020 0021 by Gemma Spence RN  Outcome: Progressing     Problem: Nutrition/Hydration-ADULT  Goal: Nutrient/Hydration intake appropriate for improving, restoring or maintaining nutritional needs  Description: Monitor and assess patient's nutrition/hydration status for malnutrition  Collaborate with interdisciplinary team and initiate plan and interventions as ordered  Monitor patient's weight and dietary intake as ordered or per policy  Utilize nutrition screening tool and intervene as necessary  Determine patient's food preferences and provide high-protein, high-caloric foods as appropriate       INTERVENTIONS:  - Monitor oral intake, urinary output, labs, and treatment plans  - Assess nutrition and hydration status and recommend course of action  - Evaluate amount of meals eaten  - Assist patient with eating if necessary   - Allow adequate time for meals  - Recommend/ encourage appropriate diets, oral nutritional supplements, and vitamin/mineral supplements  - Order, calculate, and assess calorie counts as needed  - Recommend, monitor, and adjust tube feedings and TPN/PPN based on assessed needs  - Assess need for intravenous fluids  - Provide specific nutrition/hydration education as appropriate  - Include patient/family/caregiver in decisions related to nutrition  9/20/2020 0022 by Gemma Spence RN  Outcome: Progressing  9/20/2020 0021 by Gemma Spence RN  Outcome: Progressing     Problem: Prexisting or High Potential for Compromised Skin Integrity  Goal: Skin integrity is maintained or improved  Description: INTERVENTIONS:  - Identify patients at risk for skin breakdown  - Assess and monitor skin integrity  - Assess and monitor nutrition and hydration status  - Monitor labs   - Assess for incontinence   - Turn and reposition patient  - Assist with mobility/ambulation  - Relieve pressure over bony prominences  - Avoid friction and shearing  - Provide appropriate hygiene as needed including keeping skin clean and dry  - Evaluate need for skin moisturizer/barrier cream  - Collaborate with interdisciplinary team   - Patient/family teaching  - Consider wound care consult   9/20/2020 0022 by Som Mahmood RN  Outcome: Progressing  9/20/2020 0021 by Som Mahmood RN  Outcome: Progressing

## 2020-09-20 NOTE — ASSESSMENT & PLAN NOTE
2/2 culture from 08/28/2020 growing MRSA  Likely source foot wound  Negative 2D echo  Repeat blood cultures remain negative  Continue IV daptomycin total of 4 weeks through 9/27  Weekly CBC with diff, creatinine, cpk  Outpatient ID follow-up after hospital discharge  Vascular surgery, Podiatry following    ID recommendations appreciated

## 2020-09-21 VITALS
OXYGEN SATURATION: 98 % | WEIGHT: 253.75 LBS | BODY MASS INDEX: 30.9 KG/M2 | SYSTOLIC BLOOD PRESSURE: 149 MMHG | TEMPERATURE: 97.1 F | HEIGHT: 76 IN | RESPIRATION RATE: 17 BRPM | HEART RATE: 78 BPM | DIASTOLIC BLOOD PRESSURE: 89 MMHG

## 2020-09-21 LAB
GLUCOSE SERPL-MCNC: 115 MG/DL (ref 65–140)
GLUCOSE SERPL-MCNC: 156 MG/DL (ref 65–140)

## 2020-09-21 PROCEDURE — 82948 REAGENT STRIP/BLOOD GLUCOSE: CPT

## 2020-09-21 PROCEDURE — 99232 SBSQ HOSP IP/OBS MODERATE 35: CPT | Performed by: INTERNAL MEDICINE

## 2020-09-21 PROCEDURE — 99239 HOSP IP/OBS DSCHRG MGMT >30: CPT | Performed by: INTERNAL MEDICINE

## 2020-09-21 RX ORDER — DOCUSATE SODIUM 100 MG/1
100 CAPSULE, LIQUID FILLED ORAL 2 TIMES DAILY
Qty: 10 CAPSULE | Refills: 0 | Status: SHIPPED | OUTPATIENT
Start: 2020-09-21 | End: 2020-10-05

## 2020-09-21 RX ORDER — INSULIN GLARGINE 100 [IU]/ML
30 INJECTION, SOLUTION SUBCUTANEOUS
Refills: 0
Start: 2020-09-21 | End: 2021-10-18 | Stop reason: SDUPTHER

## 2020-09-21 RX ADMIN — DAPTOMYCIN 600 MG: 500 INJECTION, POWDER, LYOPHILIZED, FOR SOLUTION INTRAVENOUS at 13:43

## 2020-09-21 RX ADMIN — VERAPAMIL HYDROCHLORIDE 240 MG: 120 TABLET, FILM COATED, EXTENDED RELEASE ORAL at 08:09

## 2020-09-21 RX ADMIN — DOCUSATE SODIUM 100 MG: 100 CAPSULE, LIQUID FILLED ORAL at 08:10

## 2020-09-21 RX ADMIN — METHIMAZOLE 5 MG: 5 TABLET ORAL at 08:09

## 2020-09-21 RX ADMIN — ASPIRIN 81 MG 81 MG: 81 TABLET ORAL at 08:09

## 2020-09-21 RX ADMIN — PANTOPRAZOLE SODIUM 20 MG: 20 TABLET, DELAYED RELEASE ORAL at 06:31

## 2020-09-21 RX ADMIN — CLOPIDOGREL BISULFATE 75 MG: 75 TABLET ORAL at 08:09

## 2020-09-21 RX ADMIN — BACITRACIN, NEOMYCIN, POLYMYXIN B 1 SMALL APPLICATION: 400; 3.5; 5 OINTMENT TOPICAL at 08:09

## 2020-09-21 RX ADMIN — HEPARIN SODIUM 5000 UNITS: 5000 INJECTION INTRAVENOUS; SUBCUTANEOUS at 06:31

## 2020-09-21 NOTE — CASE MANAGEMENT
GAL spoke with Zuleyma Brown at [facility] and was informed that the pt will need a new auth but they have bed availability    CM spoke with the pt and he states that he would like to go home  CM reached out to Podiatry and Dr Becca Nelson states that the pt is able to come to her office on Wednesday, and she will place the woundvac on the pt  Dr Becca Nelson states that [facility] will need to f/u  CM informed her that [facility] has accepted the pt  CM explained that [address] [place] [address]  would need to know the woundcare  Dr Becca Nelson states Betadine paimt soaked 4x4s and DSD with light ace daily until the woundvac is placed  The CM also informed ID that the pt would like to go home  CM called the infusion center [phone] and spoke with Wander Padron  And she states that the pt is able to go to the infusion center at 11:30am daily M-F  and [facility] at 1 pm on the weekend

## 2020-09-21 NOTE — PLAN OF CARE
Problem: Potential for Falls  Goal: Patient will remain free of falls  Description: INTERVENTIONS:  - Assess patient frequently for physical needs  -  Identify cognitive and physical deficits and behaviors that affect risk of falls    -  [place] fall precautions as indicated by assessment   - Educate patient/family on patient safety including physical limitations  - Instruct patient to call for assistance with activity based on assessment  - Modify environment to reduce risk of injury  - Consider OT/PT consult to assist with strengthening/mobility  Outcome: Adequate for Discharge     Problem: PAIN - ADULT  Goal: Verbalizes/displays adequate comfort level or baseline comfort level  Description: Interventions:  - Encourage patient to monitor pain and request assistance  - Assess pain using appropriate pain scale  - Administer analgesics based on type and severity of pain and evaluate response  - Implement non-pharmacological measures as appropriate and evaluate response  - Consider cultural and social influences on pain and pain management  - Notify physician/advanced practitioner if interventions unsuccessful or patient reports new pain  Outcome: Adequate for Discharge     Problem: INFECTION - ADULT  Goal: Absence or prevention of progression during hospitalization  Description: INTERVENTIONS:  - Assess and monitor for signs and symptoms of infection  - Monitor lab/diagnostic results  - Monitor all insertion sites, i e  indwelling lines, tubes, and drains  - Monitor endotracheal if appropriate and nasal secretions for changes in amount and color  - [place] appropriate cooling/warming therapies per order  - Administer medications as ordered  - Instruct and encourage patient and family to use good hand hygiene technique  - Identify and instruct in appropriate isolation precautions for identified infection/condition  Outcome: Adequate for Discharge  Goal: Absence of fever/infection during neutropenic period  Description: INTERVENTIONS:  - Monitor WBC    Outcome: Adequate for Discharge     Problem: SAFETY ADULT  Goal: Patient will remain free of falls  Description: INTERVENTIONS:  - Assess patient frequently for physical needs  -  Identify cognitive and physical deficits and behaviors that affect risk of falls    -  [place] fall precautions as indicated by assessment   - Educate patient/family on patient safety including physical limitations  - Instruct patient to call for assistance with activity based on assessment  - Modify environment to reduce risk of injury  - Consider OT/PT consult to assist with strengthening/mobility  Outcome: Adequate for Discharge  Goal: Maintain or return to baseline ADL function  Description: INTERVENTIONS:  -  Assess patient's ability to carry out ADLs; assess patient's baseline for ADL function and identify physical deficits which impact ability to perform ADLs (bathing, care of mouth/teeth, toileting, grooming, dressing, etc )  - Assess/evaluate cause of self-care deficits   - Assess range of motion  - Assess patient's mobility; develop plan if impaired  - Assess patient's need for assistive devices and provide as appropriate  - Encourage maximum independence but intervene and supervise when necessary  - Involve family in performance of ADLs  - Assess for home care needs following discharge   - Consider OT consult to assist with ADL evaluation and planning for discharge  - Provide patient education as appropriate  Outcome: Adequate for Discharge  Goal: Maintain or return mobility status to optimal level  Description: INTERVENTIONS:  - Assess patient's baseline mobility status (ambulation, transfers, stairs, etc )    - Identify cognitive and physical deficits and behaviors that affect mobility  - Identify mobility aids required to assist with transfers and/or ambulation (gait belt, sit-to-stand, lift, walker, cane, etc )  - [place] fall precautions as indicated by assessment  - Record patient progress and toleration of activity level on Mobility SBAR; progress patient to next Phase/Stage  - Instruct patient to call for assistance with activity based on assessment  - Consider rehabilitation consult to assist with strengthening/weightbearing, etc   Outcome: Adequate for Discharge     Problem: DISCHARGE PLANNING  Goal: Discharge to home or other facility with appropriate resources  Description: INTERVENTIONS:  - Identify barriers to discharge w/patient and caregiver  - Arrange for needed discharge resources and transportation as appropriate  - Identify discharge learning needs (meds, wound care, etc )  - Arrange for interpretive services to assist at discharge as needed  - Refer to Case Management Department for coordinating discharge planning if the patient needs post-hospital services based on physician/advanced practitioner order or complex needs related to functional status, cognitive ability, or social support system  Outcome: Adequate for Discharge     Problem: Knowledge Deficit  Goal: Patient/family/caregiver demonstrates understanding of disease process, treatment plan, medications, and discharge instructions  Description: Complete learning assessment and assess knowledge base  Interventions:  - Provide teaching at level of understanding  - Provide teaching via preferred learning methods  Outcome: Adequate for Discharge     Problem: Nutrition/Hydration-ADULT  Goal: Nutrient/Hydration intake appropriate for improving, restoring or maintaining nutritional needs  Description: Monitor and assess patient's nutrition/hydration status for malnutrition  Collaborate with interdisciplinary team and initiate plan and interventions as ordered  Monitor patient's weight and dietary intake as ordered or per policy  Utilize nutrition screening tool and intervene as necessary  Determine patient's food preferences and provide high-protein, high-caloric foods as appropriate       INTERVENTIONS:  - Monitor oral intake, urinary output, labs, and treatment plans  - Assess nutrition and hydration status and recommend course of action  - Evaluate amount of meals eaten  - Assist patient with eating if necessary   - Allow adequate time for meals  - Recommend/ encourage appropriate diets, oral nutritional supplements, and vitamin/mineral supplements  - Order, calculate, and assess calorie counts as needed  - Recommend, monitor, and adjust tube feedings and TPN/PPN based on assessed needs  - Assess need for intravenous fluids  - Provide specific nutrition/hydration education as appropriate  - Include patient/family/caregiver in decisions related to nutrition  Outcome: Adequate for Discharge     Problem: Prexisting or High Potential for Compromised Skin Integrity  Goal: Skin integrity is maintained or improved  Description: INTERVENTIONS:  - Identify patients at risk for skin breakdown  - Assess and monitor skin integrity  - Assess and monitor nutrition and hydration status  - Monitor labs   - Assess for incontinence   - Turn and reposition patient  - Assist with mobility/ambulation  - Relieve pressure over bony prominences  - Avoid friction and shearing  - Provide appropriate hygiene as needed including keeping skin clean and dry  - Evaluate need for skin moisturizer/barrier cream  - Collaborate with interdisciplinary team   - Patient/family teaching  - Consider wound care consult   Outcome: Adequate for Discharge

## 2020-09-21 NOTE — CASE MANAGEMENT
Gal met with the pt at bedside and he states that he will go to [facility] [address] and states that he made the decision because his podiatrist does not want him traveling on his foot at this time  GAL reached out to [address] and spoke with Jud King [phone]    She states that she will check bed availability and get back to the

## 2020-09-21 NOTE — PROGRESS NOTES
Progress Note - Koki Metcalf 60 y o  male MRN: [id number]    Unit/Bed#: -18 Encounter: [id number]      Assessment:  1) S/P TMA right foot secondary to osteomyelitis and nonhealing ulcer, with dehiscence of incision site  2) PAD  3) Uncontrolled diabetes  4) Renal disease  5) Diabetic neuropathy     Plan:  1) Dressings are changed with betadine  and DSD  - serous drainage is noted  - slough tissue and dehiscence is noted  2) Dressing changes for nursing orders are placed  3) Continue IV antibiotics as per Medicine and ID  4) Strict nonweightbearing to the right foot  5) Serial foot exams  6) With patient's uncontrolled diabetes, PAD, and infection status he remains at high risk for loss of limb, this is discussed with patient that this procedure will be the final foot surgery possible and if it does not heal he will need a leg amputation, patient voiced understanding   7) Patient has continued poor healing of the TMA and further dehiscence, he will likely need HBOT and serial debridements of the foot in order to salvage along with the long term IV antibiotic therapy  8) The likelyhood of this TMA being successful is low secondary to his uncontrolled diabetes, renal disease, infectious state, and PAD  Patient is also not eating food within his diabetic diet as his girlfriend is bringing him outside fast food as noted by nursing  This is again discussed with patient, he voiced understanding     9) Patient to have Wound VAC applied at office as patient is now refusing rehab, I will place an order for the Wound VAC to be delivered to the patient, the Wound VAC will be the last option to save TMA, this is discussed with the patient, he voiced understanding     Subjective:   Patient is seen bedside resting comfortably  NAD  Patient states that he is going home  He does not wish to go to rehab any more  This will place patient at higher risk for loss of limb    Patient states that he does not want to wait for Auth from his insurance anymore  Objective:     Vitals: Blood pressure 149/89, pulse 78, temperature (!) 97 1 °F (36 2 °C), resp  rate 17, height 6' 4" (1 93 m), weight 115 kg (253 lb 12 oz), SpO2 98 %  ,Body mass index is 30 89 kg/m²        Intake/Output Summary (Last 24 hours) at 9/21/2020 1452  Last data filed at 9/21/2020 1239  Gross per 24 hour   Intake 1180 ml   Output 1125 ml   Net 55 ml       Physical Exam: sutures are intact, maceration to incision site, serous drainage is noted, skin incision site shows full dehiscence, slough tissue is noted with very poor coaptation at the lateral aspect and medial aspect of the incision, dehiscence is 10 cm x 0 5 cm x 0 4 cm, there is an are of probing to subcutaneous tissue at the lateral aspect, no purulence, no fluctuence, no erythema, the skin around the incision site remains pink and viable, no signs of necrosis at this time    Invasive Devices     Peripherally Inserted Central Catheter Line            PICC Line 09/08/20 Right Other (Comment) 12 days

## 2020-09-21 NOTE — PROGRESS NOTES
Progress Note - Infectious Disease   Janene Rowe 60 y o  male MRN: [id number]  Unit/Bed#: -01 Encounter: [id number]      A/P:  1  MRSA bacteremia   Secondary to #2/3   Negative 2D echo   No intravascular prosthetic devices   Repeat blood culture are negative  Antibiotic was changed to daptomycin due to elevated creatinine, supratherapeutic levels  He is tolerating IV daptomycin without difficulty  -continue IV daptomycin 600 mg Q 24 hours based on renal function  -plan for 4 week course of IV antibiotic from negative blood culture, through 9/27  Another 6 days   -check weekly CBC with diff, creatinine, CPK  CPK from 09/19 is stable  -outpatient ID follow-up after hospital discharge  -discontinue central line after completion of IV antibiotic  -check surveillance blood cultures 2 weeks after completion of antibiotic     2  Right foot cellulitis   Suspect secondary to MRSA   Wound culture also shows Morganella, Enterobacter, Enterococcus, which is not unexpected finding in the setting of chronic ulceration   Would continue targeted therapy for MRSA infection   Now status post TMA     -antibiotic plan as above  -serial foot exams     3  Right foot nonhealing ulceration   With residual 1st and 2nd toe osteomyelitis   Podiatry input noted  Now s/p TMA on 09/03 with presumed surgical cure of osteomyelitis   There is evidence of dehiscence medially on re-evaluation   Patient remains at high risk of poor wound healing and limb loss due to uncontrolled diabetes, PAD      -antibiotic plan as above  -daily local wound care and dressing changes  -close outpatient podiatry follow-up  -patient remains at high risk of limb loss     4  Status post 1st digit amputation on 08/11/2020   Complicated by above      5   Acute kidney injury   Nephrology input noted with lower suspicion for vancomycin toxicity   Consider secondary to urinary retention   Now status post Mae catheter placement   No acute findings on renal ultrasound  Antibiotic was changed to daptomycin, as above, as vancomycin induced toxicity could not be ruled out   Creatinine continues to improve  Now down to 1 5      -antibiotic as above  -voiding trial  -nephrology follow-up ongoing  -monitor BMP closely     6  PAD   Status post recent revascularization   Vascular surgery input noted      7  Uncontrolled diabetes mellitus   Risk factor for severe infection      8  Leukocytosis  Likely reactive in the immediate postoperative setting   Remains afebrile   Blood cultures showed clearance of bacteremia   WBC count remains stable      -antibiotic plan as above  -monitor CBC  -monitor temperatures     Daptomycin 11  POD15    I discussed above plan with patient, and with case management  Subjective:  Patient feels well  His foot was redressed today by Podiatry  Denies fevers or chills  Tolerating oral intake  He is really eager to leave the hospital     Objective:  Vitals:  Temp:  [97 1 °F (36 2 °C)-97 5 °F (36 4 °C)] 97 1 °F (36 2 °C)  HR:  [78-88] 78  Resp:  [17-19] 17  BP: (123-149)/(86-92) 149/89  SpO2:  [98 %] 98 %  Temp (24hrs), Av 4 °F (36 3 °C), Min:97 1 °F (36 2 °C), Max:97 5 °F (36 4 °C)  Current: Temperature: (!) 97 1 °F (36 2 °C)    Physical Exam:   General:  No acute distress  Psychiatric:  Awake and alert  Pulmonary:  Normal respiratory excursion without accessory muscle use  Abdomen:  Soft, nontender  Extremities:  No edema  Foot dressing intact with no ascending erythema  Skin:  No rashes  Chest wall central line in place    Lab Results:  I have personally reviewed pertinent labs    Results from last 7 days   Lab Units 20  0557   POTASSIUM mmol/L 3 5 3 6 3 6   CHLORIDE mmol/L 102 102 102   CO2 mmol/L 28 28 28   BUN mg/dL 25 28* 31*   CREATININE mg/dL 1 57* 1 69* 1 88*   EGFR ml/min/1 73sq m 48 44 39   CALCIUM mg/dL 9 1 9 4 8 9     Results from last 7 days   Lab Units 20  0620  0604 09/17/20  0557   WBC Thousand/uL 10 68* 11 17* 11 00*   HEMOGLOBIN g/dL 9 8* 9 8* 9 7*   PLATELETS Thousands/uL 450* 450* 426*           Imaging Studies:   I have personally reviewed pertinent imaging study reports and images in PACS  EKG, Pathology, and Other Studies:   I have personally reviewed pertinent reports

## 2020-09-22 ENCOUNTER — PREP FOR PROCEDURE (OUTPATIENT)
Dept: INTERVENTIONAL RADIOLOGY/VASCULAR | Facility: CLINIC | Age: 55
End: 2020-09-22

## 2020-09-22 ENCOUNTER — TRANSITIONAL CARE MANAGEMENT (OUTPATIENT)
Dept: FAMILY MEDICINE CLINIC | Facility: CLINIC | Age: 55
End: 2020-09-22

## 2020-09-22 ENCOUNTER — HOSPITAL ENCOUNTER (OUTPATIENT)
Dept: INFUSION CENTER | Facility: CLINIC | Age: 55
Discharge: HOME | End: 2020-09-22
Payer: COMMERCIAL

## 2020-09-22 VITALS
TEMPERATURE: 97.3 F | DIASTOLIC BLOOD PRESSURE: 77 MMHG | SYSTOLIC BLOOD PRESSURE: 117 MMHG | HEART RATE: 76 BPM | RESPIRATION RATE: 18 BRPM

## 2020-09-22 DIAGNOSIS — B95.61 STAPHYLOCOCCUS AUREUS BACTEREMIA: Primary | ICD-10-CM

## 2020-09-22 DIAGNOSIS — R78.81 STAPHYLOCOCCUS AUREUS BACTEREMIA: Primary | ICD-10-CM

## 2020-09-22 DIAGNOSIS — R78.81 BACTEREMIA: Primary | ICD-10-CM

## 2020-09-22 DIAGNOSIS — R78.81 STAPHYLOCOCCUS AUREUS BACTEREMIA: ICD-10-CM

## 2020-09-22 DIAGNOSIS — Z71.89 COMPLEX CARE COORDINATION: Primary | ICD-10-CM

## 2020-09-22 DIAGNOSIS — B95.61 STAPHYLOCOCCUS AUREUS BACTEREMIA: ICD-10-CM

## 2020-09-22 PROCEDURE — 96365 THER/PROPH/DIAG IV INF INIT: CPT

## 2020-09-22 RX ORDER — IRBESARTAN AND HYDROCHLOROTHIAZIDE 300; 12.5 MG/1; MG/1
1 TABLET, FILM COATED ORAL DAILY
COMMUNITY
End: 2020-10-05

## 2020-09-22 RX ORDER — SODIUM CHLORIDE 9 MG/ML
20 INJECTION, SOLUTION INTRAVENOUS ONCE
Status: CANCELLED | OUTPATIENT
Start: 2020-09-23

## 2020-09-22 RX ORDER — SODIUM CHLORIDE 9 MG/ML
20 INJECTION, SOLUTION INTRAVENOUS ONCE
Status: COMPLETED | OUTPATIENT
Start: 2020-09-22 | End: 2020-09-22

## 2020-09-22 RX ADMIN — SODIUM CHLORIDE 20 ML/HR: 0.9 INJECTION, SOLUTION INTRAVENOUS at 12:08

## 2020-09-22 RX ADMIN — DAPTOMYCIN 600 MG: 500 INJECTION, POWDER, LYOPHILIZED, FOR SOLUTION INTRAVENOUS at 12:31

## 2020-09-22 NOTE — UTILIZATION REVIEW
Notification of Discharge  This is a Notification of Discharge from our facility [address]  Please be advised that this patient has been discharge from our facility  Below you will find the admission and discharge date and time including the patients disposition  PRESENTATION DATE: 8/28/2020  3:14 PM  OBS ADMISSION DATE:   IP ADMISSION DATE: 8/28/20 1534   DISCHARGE DATE: 9/21/2020  4:49 PM  [facility]   Admission Orders listed below:  Admission Orders (From admission, onward)     Ordered        08/28/20 1534  Inpatient Admission  Once                   Please contact the UR Department if additional information is required to close this patient's authorization/case  [facility] Utilization Review Department  Main: [phone] x carefully listen to the prompts  All voicemails are confidential   [email or web address]  Send all requests for admission clinical reviews, approved or denied determinations and any other requests to dedicated fax number below belonging to the campus where the patient is receiving treatment   List of dedicated fax numbers:  [address] DENIALS (Administrative/Medical Necessity) [phone]   [address] (Maternity/NICU/Pediatrics) [phone]   Jey Britton [phone]   Jack Whitehead [phone]   Mali Ag [phone]   Linsey Saba[address] [phone]   [facility]  [phone]   [address] [facility] [address]  [address] [facility] [address] [facility] [phone]

## 2020-09-22 NOTE — PROGRESS NOTES
Pt arrived for IV daptomycin offering no complaints  Per discharge summary as well as pt, abx to be continued until 9/27  Call made to Infectious Disease office to confirm  Blood work due 9/23/20  Pt had tunneled PICC placed 9/8/2020 which cannot be pulled by infusion center nurse  Pt must make appointment in IR to have line pulled once treatment is complete  Pt made aware and verbalized understanding - pt already aware of same  Central line dressing changed per protocol without incident

## 2020-09-22 NOTE — PROGRESS NOTES
Pt tolerated IV abx infusion well without incident  Both lumens flushed well with good blood return  Pt discharged in stable condition and is aware of next infusion appointment   Declined AVS

## 2020-09-22 NOTE — PLAN OF CARE
Problem: Potential for Falls  Goal: Patient will remain free of falls  Description: INTERVENTIONS:  - Assess patient frequently for physical needs  -  Identify cognitive and physical deficits and behaviors that affect risk of falls  -  [place] fall precautions as indicated by assessment   - Educate patient/family on patient safety including physical limitations  - Instruct patient to call for assistance with activity based on assessment  - Modify environment to reduce risk of injury  - Consider OT/PT consult to assist with strengthening/mobility  Outcome: Progressing     Problem: Nutrition/Hydration-ADULT  Goal: Nutrient/Hydration intake appropriate for improving, restoring or maintaining nutritional needs  Description: Monitor and assess patient's nutrition/hydration status for malnutrition  Collaborate with interdisciplinary team and initiate plan and interventions as ordered  Monitor patient's weight and dietary intake as ordered or per policy  Utilize nutrition screening tool and intervene as necessary  Determine patient's food preferences and provide high-protein, high-caloric foods as appropriate       INTERVENTIONS:  - Monitor oral intake, urinary output, labs, and treatment plans  - Assess nutrition and hydration status and recommend course of action  - Evaluate amount of meals eaten  - Assist patient with eating if necessary   - Allow adequate time for meals  - Recommend/ encourage appropriate diets, oral nutritional supplements, and vitamin/mineral supplements  - Order, calculate, and assess calorie counts as needed  - Recommend, monitor, and adjust tube feedings and TPN/PPN based on assessed needs  - Assess need for intravenous fluids  - Provide specific nutrition/hydration education as appropriate  - Include patient/family/caregiver in decisions related to nutrition  Outcome: Progressing     Problem: Knowledge Deficit  Goal: Patient/family/caregiver demonstrates understanding of disease process, treatment plan, medications, and discharge instructions  Description: Complete learning assessment and assess knowledge base    Interventions:  - Provide teaching at level of understanding  - Provide teaching via preferred learning methods  Outcome: Progressing

## 2020-09-22 NOTE — ADDENDUM NOTE
Encounter addended by: Theresa Bolden RN on: 9/22/2020 1:19 PM   Actions taken: MAR administration accepted

## 2020-09-22 NOTE — ADDENDUM NOTE
Encounter addended by: Artur Segundo RN on: 9/22/2020 1:53 PM   Actions taken: MAR administration accepted, Flowsheet accepted, Clinical Note Signed, Specialty comments modified

## 2020-09-23 ENCOUNTER — TELEPHONE (OUTPATIENT)
Dept: INTERVENTIONAL RADIOLOGY/VASCULAR | Facility: HOSPITAL | Age: 55
End: 2020-09-23

## 2020-09-23 ENCOUNTER — HOSPITAL ENCOUNTER (OUTPATIENT)
Dept: INFUSION CENTER | Facility: CLINIC | Age: 55
Discharge: HOME | End: 2020-09-23
Payer: COMMERCIAL

## 2020-09-23 ENCOUNTER — PATIENT OUTREACH (OUTPATIENT)
Dept: FAMILY MEDICINE CLINIC | Facility: CLINIC | Age: 55
End: 2020-09-23

## 2020-09-23 DIAGNOSIS — B95.61 STAPHYLOCOCCUS AUREUS BACTEREMIA: Primary | ICD-10-CM

## 2020-09-23 DIAGNOSIS — B95.61 STAPHYLOCOCCUS AUREUS BACTEREMIA: ICD-10-CM

## 2020-09-23 DIAGNOSIS — R78.81 STAPHYLOCOCCUS AUREUS BACTEREMIA: ICD-10-CM

## 2020-09-23 DIAGNOSIS — R78.81 STAPHYLOCOCCUS AUREUS BACTEREMIA: Primary | ICD-10-CM

## 2020-09-23 LAB
ALBUMIN SERPL BCP-MCNC: 3.1 G/DL (ref 3.5–5)
ALP SERPL-CCNC: 122 U/L (ref 46–116)
ALT SERPL W P-5'-P-CCNC: 39 U/L (ref 12–78)
ANION GAP SERPL CALCULATED.3IONS-SCNC: 11 MMOL/L (ref 4–13)
AST SERPL W P-5'-P-CCNC: 41 U/L (ref 5–45)
BASOPHILS # BLD AUTO: 0.08 THOUSANDS/ΜL (ref 0–0.1)
BASOPHILS NFR BLD AUTO: 1 % (ref 0–1)
BILIRUB SERPL-MCNC: 0.4 MG/DL (ref 0.2–1)
BUN SERPL-MCNC: 27 MG/DL (ref 5–25)
CALCIUM ALBUM COR SERPL-MCNC: 9.8 MG/DL (ref 8.3–10.1)
CALCIUM SERPL-MCNC: 9.1 MG/DL (ref 8.3–10.1)
CHLORIDE SERPL-SCNC: 101 MMOL/L (ref 100–108)
CK MB SERPL-MCNC: 1.3 % (ref 0–2.5)
CK MB SERPL-MCNC: 4.7 NG/ML (ref 0–5)
CK SERPL-CCNC: 358 U/L (ref 39–308)
CO2 SERPL-SCNC: 26 MMOL/L (ref 21–32)
CREAT SERPL-MCNC: 1.74 MG/DL (ref 0.6–1.3)
EOSINOPHIL # BLD AUTO: 0.74 THOUSAND/ΜL (ref 0–0.61)
EOSINOPHIL NFR BLD AUTO: 6 % (ref 0–6)
ERYTHROCYTE [DISTWIDTH] IN BLOOD BY AUTOMATED COUNT: 12.7 % (ref 11.6–15.1)
GFR SERPL CREATININE-BSD FRML MDRD: 43 ML/MIN/1.73SQ M
GLUCOSE SERPL-MCNC: 122 MG/DL (ref 65–140)
HCT VFR BLD AUTO: 31.1 % (ref 36.5–49.3)
HGB BLD-MCNC: 10.1 G/DL (ref 12–17)
IMM GRANULOCYTES # BLD AUTO: 0.04 THOUSAND/UL (ref 0–0.2)
IMM GRANULOCYTES NFR BLD AUTO: 0 % (ref 0–2)
LYMPHOCYTES # BLD AUTO: 1.97 THOUSANDS/ΜL (ref 0.6–4.47)
LYMPHOCYTES NFR BLD AUTO: 17 % (ref 14–44)
MCH RBC QN AUTO: 29.1 PG (ref 26.8–34.3)
MCHC RBC AUTO-ENTMCNC: 32.5 G/DL (ref 31.4–37.4)
MCV RBC AUTO: 90 FL (ref 82–98)
MONOCYTES # BLD AUTO: 1.17 THOUSAND/ΜL (ref 0.17–1.22)
MONOCYTES NFR BLD AUTO: 10 % (ref 4–12)
NEUTROPHILS # BLD AUTO: 7.74 THOUSANDS/ΜL (ref 1.85–7.62)
NEUTS SEG NFR BLD AUTO: 66 % (ref 43–75)
NRBC BLD AUTO-RTO: 0 /100 WBCS
PLATELET # BLD AUTO: 437 THOUSANDS/UL (ref 149–390)
PMV BLD AUTO: 10.8 FL (ref 8.9–12.7)
POTASSIUM SERPL-SCNC: 3.6 MMOL/L (ref 3.5–5.3)
PROT SERPL-MCNC: 8.2 G/DL (ref 6.4–8.2)
RBC # BLD AUTO: 3.47 MILLION/UL (ref 3.88–5.62)
SODIUM SERPL-SCNC: 138 MMOL/L (ref 136–145)
WBC # BLD AUTO: 11.74 THOUSAND/UL (ref 4.31–10.16)

## 2020-09-23 PROCEDURE — 82550 ASSAY OF CK (CPK): CPT | Performed by: INTERNAL MEDICINE

## 2020-09-23 PROCEDURE — 80053 COMPREHEN METABOLIC PANEL: CPT | Performed by: INTERNAL MEDICINE

## 2020-09-23 PROCEDURE — 96365 THER/PROPH/DIAG IV INF INIT: CPT

## 2020-09-23 PROCEDURE — 85025 COMPLETE CBC W/AUTO DIFF WBC: CPT | Performed by: INTERNAL MEDICINE

## 2020-09-23 PROCEDURE — 82553 CREATINE MB FRACTION: CPT | Performed by: INTERNAL MEDICINE

## 2020-09-23 RX ORDER — SODIUM CHLORIDE 9 MG/ML
20 INJECTION, SOLUTION INTRAVENOUS ONCE
Status: COMPLETED | OUTPATIENT
Start: 2020-09-23 | End: 2020-09-23

## 2020-09-23 RX ORDER — SODIUM CHLORIDE 9 MG/ML
20 INJECTION, SOLUTION INTRAVENOUS ONCE
Status: CANCELLED | OUTPATIENT
Start: 2020-09-24

## 2020-09-23 RX ADMIN — DAPTOMYCIN 600 MG: 500 INJECTION, POWDER, LYOPHILIZED, FOR SOLUTION INTRAVENOUS at 11:50

## 2020-09-23 RX ADMIN — SODIUM CHLORIDE 20 ML/HR: 9 INJECTION, SOLUTION INTRAVENOUS at 11:50

## 2020-09-23 NOTE — PROGRESS NOTES
Spoke with pt, he told me that he couldn't speak with me Because  he was busy  He said he will call our office when he gets a chance to schedule an appt  I did strongly advise him that he should be seen within 2 weeks of d/c

## 2020-09-23 NOTE — PROGRESS NOTES
Pt here for IV labs and IV antibiotics  Both lumens of PICC flushed with positive blood return from both  Labs drawn from PICC line  Tolerated infusion without incident  AVS declined  Patient aware of treatment time tomorrow  Wheeled out in stable condition via wheelchair

## 2020-09-23 NOTE — PROGRESS NOTES
Chart reviewed  Pt discharged to home with [address]  Spoke with [address]   Pt to be seen by Podiatrist today for wound vac placement and home visit to be made on Friday for VAC dressing change  Pt to have skilled nursing, physical and occupational therapy  Message left on patients home answering machine with my name, number and request for return call

## 2020-09-24 ENCOUNTER — PATIENT OUTREACH (OUTPATIENT)
Dept: FAMILY MEDICINE CLINIC | Facility: CLINIC | Age: 55
End: 2020-09-24

## 2020-09-24 ENCOUNTER — HOSPITAL ENCOUNTER (OUTPATIENT)
Dept: INFUSION CENTER | Facility: CLINIC | Age: 55
Discharge: HOME | End: 2020-09-24
Payer: COMMERCIAL

## 2020-09-24 VITALS
RESPIRATION RATE: 18 BRPM | SYSTOLIC BLOOD PRESSURE: 101 MMHG | TEMPERATURE: 98 F | HEART RATE: 79 BPM | DIASTOLIC BLOOD PRESSURE: 57 MMHG

## 2020-09-24 DIAGNOSIS — B95.61 STAPHYLOCOCCUS AUREUS BACTEREMIA: ICD-10-CM

## 2020-09-24 DIAGNOSIS — B95.61 STAPHYLOCOCCUS AUREUS BACTEREMIA: Primary | ICD-10-CM

## 2020-09-24 DIAGNOSIS — R78.81 STAPHYLOCOCCUS AUREUS BACTEREMIA: ICD-10-CM

## 2020-09-24 DIAGNOSIS — R78.81 STAPHYLOCOCCUS AUREUS BACTEREMIA: Primary | ICD-10-CM

## 2020-09-24 PROCEDURE — 96365 THER/PROPH/DIAG IV INF INIT: CPT

## 2020-09-24 RX ORDER — SODIUM CHLORIDE 9 MG/ML
20 INJECTION, SOLUTION INTRAVENOUS ONCE
Status: COMPLETED | OUTPATIENT
Start: 2020-09-24 | End: 2020-09-24

## 2020-09-24 RX ORDER — SODIUM CHLORIDE 9 MG/ML
20 INJECTION, SOLUTION INTRAVENOUS ONCE
Status: CANCELLED | OUTPATIENT
Start: 2020-09-25

## 2020-09-24 RX ADMIN — SODIUM CHLORIDE 20 ML/HR: 0.9 INJECTION, SOLUTION INTRAVENOUS at 11:56

## 2020-09-24 RX ADMIN — DAPTOMYCIN 600 MG: 500 INJECTION, POWDER, LYOPHILIZED, FOR SOLUTION INTRAVENOUS at 11:57

## 2020-09-24 NOTE — PLAN OF CARE
Problem: Potential for Falls  Goal: Patient will remain free of falls  Description: INTERVENTIONS:  - Assess patient frequently for physical needs  -  Identify cognitive and physical deficits and behaviors that affect risk of falls    -  [place] fall precautions as indicated by assessment   - Educate patient/family on patient safety including physical limitations  - Instruct patient to call for assistance with activity based on assessment  - Modify environment to reduce risk of injury  - Consider OT/PT consult to assist with strengthening/mobility  Outcome: Progressing

## 2020-09-24 NOTE — PROGRESS NOTES
Pt returned my call, agrees to outreach  Pt was discharged to home with [address]  Pt was seen by Podiatrist yesterday and wound VAC was placed on right TMA wound  Pt expressed he is aware of keeping unit charged at all times and to notify [facility]NA if dressing loosens or seal is broken  Provided phone number to patient, he will call to find out time of visit on Friday  Pt is presently non-weightbearing  Pt uses a wheelchair in the home  [place] but stays on 1st level  Able to navigate 4 steps to enter with newly installed rails and grab bars on both sides of steps  Has 3 in 1 commode in home as well as scooter  Pt is going to outpatient infusion daily for antibiotic infusions via PICC line  Last dose is reported by pt to be Sunday and is scheduled for PICC removal on Monday  He is scheduled with podiatrist next Wednesday  Reviewed diabetic foot care with accurate teach back  Pt reports his FBS today was 90  We reviewed glycemic indexes and to report blood sugars to Endocrinologist below 70 or above 300  We reviewed treatment of hypoglycemia and pt expressed if his sugar was below 70 he would eat "crackers or drink OJ or Apple juice"  He has glucometer in the home and checks his blood sugars four times a day  He has a follow up appointment with Dr Nii Mahmood, Endocrine  Pt added that he needs to follow up with nephrologist as his "kidneys were impacted by the antibiotics"  We reviewed [place] precautions and avoid use of NSAIDS  Pt stated goal if for his wound to heal, obtain a prosthesis and to "walk again"  He freely shared that his blood sugars were "out of control" and attributed this to being "an alcoholic"  He states that he has not had alcohol in a "long time" and has "no desire to drink it"  Supportive listening offered    Pt has independently scheduled his follow up medical appointments as per AVS   He states he has "too many appointments and plans to see PCP after all his specialty appointments"  Advised to schedule PCP appointment within 14 days of discharge  Will continue to follow

## 2020-09-24 NOTE — PROGRESS NOTES
Pt tolerated IV abx infusion without incident  Purple & white lumen both noted good blood return & flushed per protocol  Pt declined AVS and is aware of what time to return for his appt tomorrow

## 2020-09-25 ENCOUNTER — HOSPITAL ENCOUNTER (OUTPATIENT)
Dept: INFUSION CENTER | Facility: CLINIC | Age: 55
Discharge: HOME | End: 2020-09-25
Payer: COMMERCIAL

## 2020-09-25 VITALS
HEART RATE: 85 BPM | TEMPERATURE: 97.7 F | RESPIRATION RATE: 18 BRPM | SYSTOLIC BLOOD PRESSURE: 120 MMHG | DIASTOLIC BLOOD PRESSURE: 77 MMHG

## 2020-09-25 DIAGNOSIS — R78.81 STAPHYLOCOCCUS AUREUS BACTEREMIA: Primary | ICD-10-CM

## 2020-09-25 DIAGNOSIS — R78.81 STAPHYLOCOCCUS AUREUS BACTEREMIA: ICD-10-CM

## 2020-09-25 DIAGNOSIS — B95.61 STAPHYLOCOCCUS AUREUS BACTEREMIA: Primary | ICD-10-CM

## 2020-09-25 DIAGNOSIS — B95.61 STAPHYLOCOCCUS AUREUS BACTEREMIA: ICD-10-CM

## 2020-09-25 PROCEDURE — 96365 THER/PROPH/DIAG IV INF INIT: CPT

## 2020-09-25 RX ORDER — SODIUM CHLORIDE 9 MG/ML
20 INJECTION, SOLUTION INTRAVENOUS ONCE
Status: COMPLETED | OUTPATIENT
Start: 2020-09-25 | End: 2020-09-25

## 2020-09-25 RX ORDER — SODIUM CHLORIDE 9 MG/ML
20 INJECTION, SOLUTION INTRAVENOUS ONCE
Status: CANCELLED | OUTPATIENT
Start: 2020-09-26

## 2020-09-25 RX ADMIN — DAPTOMYCIN 600 MG: 500 INJECTION, POWDER, LYOPHILIZED, FOR SOLUTION INTRAVENOUS at 11:57

## 2020-09-25 RX ADMIN — SODIUM CHLORIDE 20 ML/HR: 0.9 INJECTION, SOLUTION INTRAVENOUS at 11:56

## 2020-09-25 NOTE — PROGRESS NOTES
Patient tolerated IV ABX infusion without incident  Both purple and white lumens flushed with positive blood return noted in both  Pt declined AVS   Knows what times to go to [facility] for this weekend  Also aware to have his blood drawn before treatment on Sunday as that is what is ordered for 9/27  Patient left floor in stable condition

## 2020-09-26 ENCOUNTER — HOSPITAL ENCOUNTER (OUTPATIENT)
Dept: INFUSION CENTER | Facility: CLINIC | Age: 55
Discharge: HOME | End: 2020-09-26
Payer: COMMERCIAL

## 2020-09-26 VITALS
DIASTOLIC BLOOD PRESSURE: 69 MMHG | HEART RATE: 88 BPM | TEMPERATURE: 97.3 F | RESPIRATION RATE: 18 BRPM | SYSTOLIC BLOOD PRESSURE: 111 MMHG

## 2020-09-26 DIAGNOSIS — B95.61 STAPHYLOCOCCUS AUREUS BACTEREMIA: ICD-10-CM

## 2020-09-26 DIAGNOSIS — R78.81 STAPHYLOCOCCUS AUREUS BACTEREMIA: ICD-10-CM

## 2020-09-26 DIAGNOSIS — R78.81 STAPHYLOCOCCUS AUREUS BACTEREMIA: Primary | ICD-10-CM

## 2020-09-26 DIAGNOSIS — B95.61 STAPHYLOCOCCUS AUREUS BACTEREMIA: Primary | ICD-10-CM

## 2020-09-26 PROCEDURE — 96365 THER/PROPH/DIAG IV INF INIT: CPT

## 2020-09-26 RX ORDER — SODIUM CHLORIDE 9 MG/ML
20 INJECTION, SOLUTION INTRAVENOUS ONCE
Status: CANCELLED | OUTPATIENT
Start: 2020-09-27

## 2020-09-26 RX ORDER — SODIUM CHLORIDE 9 MG/ML
20 INJECTION, SOLUTION INTRAVENOUS ONCE
Status: COMPLETED | OUTPATIENT
Start: 2020-09-26 | End: 2020-09-26

## 2020-09-26 RX ADMIN — DAPTOMYCIN 600 MG: 500 INJECTION, POWDER, LYOPHILIZED, FOR SOLUTION INTRAVENOUS at 12:57

## 2020-09-26 RX ADMIN — SODIUM CHLORIDE 20 ML/HR: 0.9 INJECTION, SOLUTION INTRAVENOUS at 12:53

## 2020-09-26 NOTE — DISCHARGE SUMMARY
Discharge- Radha Peck 1965, 60 y o  male MRN: [id number]    Unit/Bed#: -01 Encounter: [id number]    Primary Care Provider: Tristian Galdamez MD   Date and time admitted to hospital: 8/28/2020  3:14 PM        MRSA bacteremia  Assessment & Plan  2/2 culture from 08/28/2020 growing MRSA  Likely source foot wound  Negative 2D echo  Repeat blood cultures remain negative  Continue IV daptomycin total of 4 weeks through 9/27  Weekly CBC with diff, creatinine, cpk  Outpatient ID follow-up after hospital discharge  Vascular surgery, Podiatry following  ID recommendations appreciated      * Osteomyelitis [facility])  Assessment & Plan  Recent history of right hallux amputation  Presented with wound dehiscence, wound cellulitis, possible osteomyelitis  2/2 blood culture from 08/28/2020 growing MRSA  Right foot MRI report reviewed and noted for osteomyelitis  Status post right-sided TMA    Was switched from vancomycin to  daptomycin given ATN    Currently afebrile, hemodynamically stable  Encourage for strict nonweightbearing to right lower extremity at this time  Continue dressing changes per podiatrist recommendation  Acute kidney injury (MARKELL) with acute tubular necrosis (ATN) ([facility])  Assessment & Plan  Likely secondary to sepsis/surgery,+/- urinary retention  Creatinine peaked to 2 32 on 9/5, was down to 1 6   Baseline appears to be 0 8-0 9  Creatinine currently stable an improving  nephro following recs appreciated    Patient required 3 straight catheterization, Mae inserted per protocol  C/w Flomax   Avoid nephrotoxin and hypotension  Medically stable for discharge  follow-up with Nephrology in the outpatient basis    Urinary retention  Assessment & Plan  Required 3 straight straight catheterization,yielded 500 - 700ml urine  Mae inserted 09/8/20 resolved, Mae removed prior to discharge patient was voiding well with no difficulty          PAD (peripheral artery disease) (HCC)  Assessment & Plan  Patient history of recent right popliteal PTA/ stent 08/06/2020 and right hallux amputation  Now noted with osteomyelitis as evident on right foot MRI report  Continue aspirin, statin, Plavix  Follow-up with vascular surgery recommendation as well as podiatry following  Hyperthyroidism  Assessment & Plan  · Diagnosed with thyroid cancer in July  · TSH 0 080  · Free T4 1 39  · Continue home dose of methimazole 5 mg daily  · Recommended close outpatient follow-up  Type 2 diabetes mellitus with diabetic polyneuropathy, with long-term current use of insulin (HCC)  Assessment & Plan  Lab Results   Component Value Date    HGBA1C 8 1 (H) 08/28/2020       No results for input(s): POCGLU in the last 72 hours  Blood Sugar Average: Last 72 hrs:  ·        Hemoglobin A1c 8 1  Blood sugars improving  Continue Lantus 30 units subcu HS, Humalog 15 units t i d  Continue SSI  Continue to monitor sugar        Hyperlipidemia  Assessment & Plan  · Continue statin    Gastroesophageal reflux disease without esophagitis  Assessment & Plan  · Continue PPI    Hypertension, essential  Assessment & Plan  · Controlled  · Continue verapamil  Avalide held secondary to acute kidney injury  · Continue to monitor blood pressure closely  · BP stable        Discharging Physician / Practitioner: Vin Prieto MD  PCP: Keyonna Garrett MD  Admission Date:   Admission Orders (From admission, onward)     Ordered        08/28/20 1534  Inpatient Admission  Once                   Discharge Date: 09/26/20    Resolved Problems  Date Reviewed: 9/26/2020    None          Consultations During Hospital Stay:  Marci Baez  Podiatry  Urology  Vascular surgery      Procedures Performed:   ·     Significant Findings / Test Results:   Ultrasound kidney and bladder no significant abnormality    X-ray of the foot  Interval first through fourth transmetatarsal and fifth toe amputation with expected postsurgical changes    No radiographic evidence of osteomyelitis    MRI of the foot  Soft tissue wound/ulcer adjacent to distal first metatarsal with underlying phlegmonous cellulitis with a thin tapering tail extending towards the distal residual flexor hallucis longus tendon  New osteomyelitis of the distal residual first metatarsal tapering proximally to the level of the base  Reactive changes in the hallux tibial sesamoid  New osteomyelitis medial aspect of the second metatarsal head and base of the second proximal phalanx  new distal flexor hallucis longus tenosynovitis extending to the level of the mid foot  Infectious tenosynovitis not excluded  Incidental Findings:   · none    Test Results Pending at Discharge (will require follow up):   · none     Outpatient Tests Requested:  · none    Complications: none    Reason for Admission:     Hospital Course:     Rupa Ortez is a 60 y o  male patient with past medical history of uncontrolled diabetes, obesity, hypertension, prior hospitalization a month ago her right great toe gangrene status post amputation who originally presented to the hospital on 8/28/2020 due to right foot infection  MRI of the foot showed soft tissue wound ulcer adjacent to the distal 1st metatarsal with phlegmonous cellulitis and osteomyelitis in this the residual 1st metatarsal and medial aspect of 2nd metatarsal head  Patient was placed on broad-spectrum antibiotics, later antibiotics were tapered based on blood cultures  He had MRSA bacteremia  Echocardiogram was negative for LACY  Podiatry evaluated patient, patient underwent surgical resection  He was initially placed on vancomycin however due to acute kidney injury from ATN antibiotics were changed to daptomycin  Has lost dose of antibiotic would be 9/27  Patient did have acute kidney injury from ATN, nephrology assisted in management    Creatinine was trending down, recommended outpatient follow-up after discharge  He had acute urinary retention for which Tu was placed however as patient's kidney function improved Mae was discontinued and patient voided without any difficulty  Recommended outpatient urology follow-up  Please see above list of diagnoses and related plan for additional information  Condition at Discharge: stable     Discharge Day Visit / Exam:     Subjective:  Patient seen and examined  No new complaints at this time  Anxious for discharge  Vitals: Blood Pressure: 149/89 (09/21/20 0807)  Pulse: 78 (09/20/20 2203)  Temperature: (!) 97 1 °F (36 2 °C) (09/21/20 0807)  Temp Source: Oral (09/20/20 2203)  Respirations: 17 (09/20/20 2203)  Height: 6' 4" (193 cm) (08/28/20 1706)  Weight - Scale: 115 kg (253 lb 12 oz) (08/28/20 1706)  SpO2: 98 % (09/20/20 2203)  Exam:   Physical Exam  Vitals signs and nursing note reviewed  Constitutional:       Appearance: Normal appearance  HENT:      Head: Normocephalic and atraumatic  Neck:      Musculoskeletal: Normal range of motion and neck supple  Cardiovascular:      Rate and Rhythm: Normal rate and regular rhythm  Pulmonary:      Effort: Pulmonary effort is normal       Breath sounds: Normal breath sounds  Abdominal:      General: Abdomen is flat  Palpations: Abdomen is soft  Musculoskeletal: Normal range of motion  Skin:     General: Skin is warm and dry  Neurological:      General: No focal deficit present  Mental Status: He is alert and oriented to person, place, and time  Discussion with Family:     Discharge instructions/Information to patient and family:   See after visit summary for information provided to patient and family  Provisions for Follow-Up Care:  See after visit summary for information related to follow-up care and any pertinent home health orders  Disposition:     Home with VNA Services (Reminder: Complete face to face encounter)      Planned Readmission:      Discharge Statement:  I spent 35 minutes discharging the patient   This time was spent on the day of discharge  I had direct contact with the patient on the day of discharge  Greater than 50% of the total time was spent examining patient, answering all patient questions, arranging and discussing plan of care with patient as well as directly providing post-discharge instructions  Additional time then spent on discharge activities  Discharge Medications:  See after visit summary for reconciled discharge medications provided to patient and family        ** Please Note: This note has been constructed using a voice recognition system **

## 2020-09-26 NOTE — ASSESSMENT & PLAN NOTE
Likely secondary to sepsis/surgery,+/- urinary retention  Creatinine peaked to 2 32 on 9/5, was down to 1 6   Baseline appears to be 0 8-0 9  Creatinine currently stable an improving   nephro following recs appreciated    Patient required 3 straight catheterization, Mae inserted per protocol  C/w Flomax   Avoid nephrotoxin and hypotension  Medically stable for discharge  follow-up with Nephrology in the outpatient basis

## 2020-09-26 NOTE — PROGRESS NOTES
Pt  tolerated treatment without incident, AVS declined  Pt aware he is to go to registration desk in [place] for infusion and is to go to IR on Monday to have line removed

## 2020-09-26 NOTE — ASSESSMENT & PLAN NOTE
Required 3 straight straight catheterization,yielded 500 - 700ml urine  Mae inserted 09/8/20 resolved, Mae removed prior to discharge patient was voiding well with no difficulty

## 2020-09-26 NOTE — ASSESSMENT & PLAN NOTE
Lab Results   Component Value Date    HGBA1C 8 1 (H) 08/28/2020       No results for input(s): POCGLU in the last 72 hours  Blood Sugar Average: Last 72 hrs:  ·        Hemoglobin A1c 8 1  Blood sugars improving  Continue Lantus 30 units subcu HS, Humalog 15 units t i d    Continue SSI  Continue to monitor sugar

## 2020-09-27 ENCOUNTER — HOSPITAL ENCOUNTER (OUTPATIENT)
Dept: INFUSION CENTER | Facility: CLINIC | Age: 55
Discharge: HOME | End: 2020-09-27
Payer: COMMERCIAL

## 2020-09-27 VITALS
TEMPERATURE: 97.9 F | RESPIRATION RATE: 18 BRPM | OXYGEN SATURATION: 96 % | DIASTOLIC BLOOD PRESSURE: 68 MMHG | SYSTOLIC BLOOD PRESSURE: 101 MMHG | HEART RATE: 78 BPM

## 2020-09-27 DIAGNOSIS — R78.81 STAPHYLOCOCCUS AUREUS BACTEREMIA: Primary | ICD-10-CM

## 2020-09-27 DIAGNOSIS — R78.81 STAPHYLOCOCCUS AUREUS BACTEREMIA: ICD-10-CM

## 2020-09-27 DIAGNOSIS — B95.61 STAPHYLOCOCCUS AUREUS BACTEREMIA: ICD-10-CM

## 2020-09-27 DIAGNOSIS — B95.61 STAPHYLOCOCCUS AUREUS BACTEREMIA: Primary | ICD-10-CM

## 2020-09-27 LAB
ALBUMIN SERPL BCP-MCNC: 3.1 G/DL (ref 3.5–5)
ALP SERPL-CCNC: 109 U/L (ref 46–116)
ALT SERPL W P-5'-P-CCNC: 22 U/L (ref 12–78)
ANION GAP SERPL CALCULATED.3IONS-SCNC: 15 MMOL/L (ref 4–13)
AST SERPL W P-5'-P-CCNC: 29 U/L (ref 5–45)
BASOPHILS # BLD AUTO: 0.06 THOUSANDS/ΜL (ref 0–0.1)
BASOPHILS NFR BLD AUTO: 1 % (ref 0–1)
BILIRUB SERPL-MCNC: 0.67 MG/DL (ref 0.2–1)
BUN SERPL-MCNC: 32 MG/DL (ref 5–25)
CALCIUM ALBUM COR SERPL-MCNC: 9.7 MG/DL (ref 8.3–10.1)
CALCIUM SERPL-MCNC: 9 MG/DL (ref 8.3–10.1)
CHLORIDE SERPL-SCNC: 99 MMOL/L (ref 100–108)
CK MB SERPL-MCNC: 1.5 NG/ML (ref 0–5)
CK MB SERPL-MCNC: <1 % (ref 0–2.5)
CK SERPL-CCNC: 344 U/L (ref 39–308)
CO2 SERPL-SCNC: 23 MMOL/L (ref 21–32)
CREAT SERPL-MCNC: 1.81 MG/DL (ref 0.6–1.3)
EOSINOPHIL # BLD AUTO: 1.03 THOUSAND/ΜL (ref 0–0.61)
EOSINOPHIL NFR BLD AUTO: 9 % (ref 0–6)
ERYTHROCYTE [DISTWIDTH] IN BLOOD BY AUTOMATED COUNT: 12.9 % (ref 11.6–15.1)
GFR SERPL CREATININE-BSD FRML MDRD: 41 ML/MIN/1.73SQ M
GLUCOSE SERPL-MCNC: 147 MG/DL (ref 65–140)
HCT VFR BLD AUTO: 32.8 % (ref 36.5–49.3)
HGB BLD-MCNC: 10.9 G/DL (ref 12–17)
IMM GRANULOCYTES # BLD AUTO: 0.06 THOUSAND/UL (ref 0–0.2)
IMM GRANULOCYTES NFR BLD AUTO: 1 % (ref 0–2)
LYMPHOCYTES # BLD AUTO: 1.98 THOUSANDS/ΜL (ref 0.6–4.47)
LYMPHOCYTES NFR BLD AUTO: 16 % (ref 14–44)
MCH RBC QN AUTO: 29.5 PG (ref 26.8–34.3)
MCHC RBC AUTO-ENTMCNC: 33.2 G/DL (ref 31.4–37.4)
MCV RBC AUTO: 89 FL (ref 82–98)
MONOCYTES # BLD AUTO: 1.41 THOUSAND/ΜL (ref 0.17–1.22)
MONOCYTES NFR BLD AUTO: 12 % (ref 4–12)
NEUTROPHILS # BLD AUTO: 7.55 THOUSANDS/ΜL (ref 1.85–7.62)
NEUTS SEG NFR BLD AUTO: 61 % (ref 43–75)
NRBC BLD AUTO-RTO: 0 /100 WBCS
PLATELET # BLD AUTO: 430 THOUSANDS/UL (ref 149–390)
PMV BLD AUTO: 11 FL (ref 8.9–12.7)
POTASSIUM SERPL-SCNC: 3.7 MMOL/L (ref 3.5–5.3)
PROT SERPL-MCNC: 8.2 G/DL (ref 6.4–8.2)
RBC # BLD AUTO: 3.69 MILLION/UL (ref 3.88–5.62)
SODIUM SERPL-SCNC: 137 MMOL/L (ref 136–145)
WBC # BLD AUTO: 12.09 THOUSAND/UL (ref 4.31–10.16)

## 2020-09-27 PROCEDURE — 82550 ASSAY OF CK (CPK): CPT | Performed by: INTERNAL MEDICINE

## 2020-09-27 PROCEDURE — 96365 THER/PROPH/DIAG IV INF INIT: CPT

## 2020-09-27 PROCEDURE — 82553 CREATINE MB FRACTION: CPT | Performed by: INTERNAL MEDICINE

## 2020-09-27 PROCEDURE — 85025 COMPLETE CBC W/AUTO DIFF WBC: CPT | Performed by: INTERNAL MEDICINE

## 2020-09-27 PROCEDURE — 80053 COMPREHEN METABOLIC PANEL: CPT | Performed by: INTERNAL MEDICINE

## 2020-09-27 RX ORDER — SODIUM CHLORIDE 9 MG/ML
20 INJECTION, SOLUTION INTRAVENOUS ONCE
Status: CANCELLED | OUTPATIENT
Start: 2020-09-27

## 2020-09-27 RX ORDER — SODIUM CHLORIDE 9 MG/ML
20 INJECTION, SOLUTION INTRAVENOUS ONCE
Status: COMPLETED | OUTPATIENT
Start: 2020-09-27 | End: 2020-09-27

## 2020-09-27 RX ADMIN — SODIUM CHLORIDE 20 ML/HR: 0.9 INJECTION, SOLUTION INTRAVENOUS at 13:15

## 2020-09-27 RX ADMIN — DAPTOMYCIN 600 MG: 500 INJECTION, POWDER, LYOPHILIZED, FOR SOLUTION INTRAVENOUS at 13:17

## 2020-09-28 ENCOUNTER — HOSPITAL ENCOUNTER (OUTPATIENT)
Dept: INTERVENTIONAL RADIOLOGY/VASCULAR | Facility: HOSPITAL | Age: 55
Discharge: HOME | End: 2020-09-28
Attending: STUDENT IN AN ORGANIZED HEALTH CARE EDUCATION/TRAINING PROGRAM
Payer: COMMERCIAL

## 2020-09-28 ENCOUNTER — TELEPHONE (OUTPATIENT)
Dept: FAMILY MEDICINE CLINIC | Facility: CLINIC | Age: 55
End: 2020-09-28

## 2020-09-28 ENCOUNTER — TELEPHONE (OUTPATIENT)
Dept: INTERVENTIONAL RADIOLOGY/VASCULAR | Facility: HOSPITAL | Age: 55
End: 2020-09-28

## 2020-09-28 DIAGNOSIS — R78.81 BACTEREMIA: ICD-10-CM

## 2020-09-28 PROCEDURE — 36589 REMOVAL TUNNELED CV CATH: CPT

## 2020-09-28 PROCEDURE — NC001 PR NO CHARGE: Performed by: RADIOLOGY

## 2020-09-29 DIAGNOSIS — N17.9 ACUTE RENAL FAILURE, UNSPECIFIED ACUTE RENAL FAILURE TYPE (HCC): Primary | ICD-10-CM

## 2020-09-29 PROCEDURE — 3066F NEPHROPATHY DOC TX: CPT | Performed by: FAMILY MEDICINE

## 2020-09-29 NOTE — PROGRESS NOTES
LM to do labs prior appt scheduled for 10/6/20 with Dr Rosina Juarez  Asked to call at [phone] if any questions

## 2020-10-05 ENCOUNTER — TELEPHONE (OUTPATIENT)
Dept: NEPHROLOGY | Facility: CLINIC | Age: 55
End: 2020-10-05

## 2020-10-05 ENCOUNTER — TELEMEDICINE (OUTPATIENT)
Dept: FAMILY MEDICINE CLINIC | Facility: CLINIC | Age: 55
End: 2020-10-05
Payer: COMMERCIAL

## 2020-10-05 DIAGNOSIS — N17.0 ACUTE KIDNEY INJURY (AKI) WITH ACUTE TUBULAR NECROSIS (ATN) (HCC): ICD-10-CM

## 2020-10-05 DIAGNOSIS — E11.42 TYPE 2 DIABETES MELLITUS WITH DIABETIC POLYNEUROPATHY, WITH LONG-TERM CURRENT USE OF INSULIN (HCC): ICD-10-CM

## 2020-10-05 DIAGNOSIS — I73.9 PAD (PERIPHERAL ARTERY DISEASE) (HCC): Primary | ICD-10-CM

## 2020-10-05 DIAGNOSIS — E05.90 HYPERTHYROIDISM: ICD-10-CM

## 2020-10-05 DIAGNOSIS — Z79.4 TYPE 2 DIABETES MELLITUS WITH DIABETIC POLYNEUROPATHY, WITH LONG-TERM CURRENT USE OF INSULIN (HCC): ICD-10-CM

## 2020-10-05 DIAGNOSIS — M86.9 OSTEOMYELITIS OF RIGHT FOOT, UNSPECIFIED TYPE (HCC): ICD-10-CM

## 2020-10-05 DIAGNOSIS — E78.5 HYPERLIPIDEMIA, UNSPECIFIED HYPERLIPIDEMIA TYPE: ICD-10-CM

## 2020-10-05 DIAGNOSIS — Z91.19 HISTORY OF NONADHERENCE TO MEDICAL TREATMENT: ICD-10-CM

## 2020-10-05 DIAGNOSIS — I10 HYPERTENSION, ESSENTIAL: ICD-10-CM

## 2020-10-05 PROBLEM — N17.9 ACUTE KIDNEY INJURY (HCC): Status: RESOLVED | Noted: 2020-08-04 | Resolved: 2020-10-05

## 2020-10-05 PROBLEM — E11.9 DIABETES MELLITUS (HCC): Status: RESOLVED | Noted: 2018-11-16 | Resolved: 2020-10-05

## 2020-10-05 PROBLEM — E11.65 TYPE 2 DIABETES MELLITUS WITH HYPERGLYCEMIA, WITH LONG-TERM CURRENT USE OF INSULIN (HCC): Status: RESOLVED | Noted: 2018-11-16 | Resolved: 2020-10-05

## 2020-10-05 PROCEDURE — 99495 TRANSJ CARE MGMT MOD F2F 14D: CPT | Performed by: FAMILY MEDICINE

## 2020-10-05 RX ORDER — CLOPIDOGREL BISULFATE 75 MG/1
75 TABLET ORAL DAILY
Qty: 30 TABLET | Refills: 3 | Status: ON HOLD | OUTPATIENT
Start: 2020-10-05 | End: 2021-02-03

## 2020-10-05 RX ORDER — ATORVASTATIN CALCIUM 40 MG/1
40 TABLET, FILM COATED ORAL
Qty: 30 TABLET | Refills: 3 | Status: SHIPPED | OUTPATIENT
Start: 2020-10-05 | End: 2021-02-24 | Stop reason: SDUPTHER

## 2020-10-06 ENCOUNTER — TELEMEDICINE (OUTPATIENT)
Dept: NEPHROLOGY | Facility: CLINIC | Age: 55
End: 2020-10-06
Payer: COMMERCIAL

## 2020-10-06 VITALS — RESPIRATION RATE: 16 BRPM | WEIGHT: 244 LBS | BODY MASS INDEX: 29.71 KG/M2 | HEIGHT: 76 IN

## 2020-10-06 DIAGNOSIS — N17.0 ACUTE KIDNEY INJURY (AKI) WITH ACUTE TUBULAR NECROSIS (ATN) (HCC): Primary | ICD-10-CM

## 2020-10-06 DIAGNOSIS — I10 HYPERTENSION, ESSENTIAL: ICD-10-CM

## 2020-10-06 DIAGNOSIS — E11.42 TYPE 2 DIABETES MELLITUS WITH DIABETIC POLYNEUROPATHY, WITH LONG-TERM CURRENT USE OF INSULIN (HCC): ICD-10-CM

## 2020-10-06 DIAGNOSIS — M86.9 OSTEOMYELITIS OF RIGHT FOOT, UNSPECIFIED TYPE (HCC): ICD-10-CM

## 2020-10-06 DIAGNOSIS — Z79.4 TYPE 2 DIABETES MELLITUS WITH DIABETIC POLYNEUROPATHY, WITH LONG-TERM CURRENT USE OF INSULIN (HCC): ICD-10-CM

## 2020-10-06 PROCEDURE — 99214 OFFICE O/P EST MOD 30 MIN: CPT | Performed by: INTERNAL MEDICINE

## 2020-10-07 ENCOUNTER — PATIENT OUTREACH (OUTPATIENT)
Dept: FAMILY MEDICINE CLINIC | Facility: CLINIC | Age: 55
End: 2020-10-07

## 2020-10-08 ENCOUNTER — TELEMEDICINE (OUTPATIENT)
Dept: FAMILY MEDICINE CLINIC | Facility: CLINIC | Age: 55
End: 2020-10-08
Payer: COMMERCIAL

## 2020-10-08 ENCOUNTER — TELEPHONE (OUTPATIENT)
Dept: FAMILY MEDICINE CLINIC | Facility: CLINIC | Age: 55
End: 2020-10-08

## 2020-10-08 VITALS — HEIGHT: 76 IN | BODY MASS INDEX: 29.22 KG/M2 | WEIGHT: 240 LBS

## 2020-10-08 DIAGNOSIS — E11.42 TYPE 2 DIABETES MELLITUS WITH DIABETIC POLYNEUROPATHY, WITH LONG-TERM CURRENT USE OF INSULIN (HCC): ICD-10-CM

## 2020-10-08 DIAGNOSIS — Z79.4 TYPE 2 DIABETES MELLITUS WITH DIABETIC POLYNEUROPATHY, WITH LONG-TERM CURRENT USE OF INSULIN (HCC): ICD-10-CM

## 2020-10-08 DIAGNOSIS — D64.9 ANEMIA, UNSPECIFIED TYPE: ICD-10-CM

## 2020-10-08 DIAGNOSIS — I10 HYPERTENSION, ESSENTIAL: ICD-10-CM

## 2020-10-08 DIAGNOSIS — I95.9 HYPOTENSION, UNSPECIFIED HYPOTENSION TYPE: Primary | ICD-10-CM

## 2020-10-08 PROCEDURE — 99214 OFFICE O/P EST MOD 30 MIN: CPT | Performed by: FAMILY MEDICINE

## 2020-10-08 PROCEDURE — 1036F TOBACCO NON-USER: CPT | Performed by: FAMILY MEDICINE

## 2020-10-10 ENCOUNTER — APPOINTMENT (OUTPATIENT)
Dept: LAB | Facility: HOSPITAL | Age: 55
End: 2020-10-10
Attending: INTERNAL MEDICINE
Payer: COMMERCIAL

## 2020-10-10 DIAGNOSIS — E87.6 HYPOKALEMIA: ICD-10-CM

## 2020-10-10 DIAGNOSIS — I95.9 HYPOTENSION, UNSPECIFIED HYPOTENSION TYPE: ICD-10-CM

## 2020-10-10 DIAGNOSIS — R78.81 STAPHYLOCOCCUS AUREUS BACTEREMIA: ICD-10-CM

## 2020-10-10 DIAGNOSIS — E11.42 TYPE 2 DIABETES MELLITUS WITH DIABETIC POLYNEUROPATHY, WITH LONG-TERM CURRENT USE OF INSULIN (HCC): ICD-10-CM

## 2020-10-10 DIAGNOSIS — Z79.4 TYPE 2 DIABETES MELLITUS WITH DIABETIC POLYNEUROPATHY, WITH LONG-TERM CURRENT USE OF INSULIN (HCC): ICD-10-CM

## 2020-10-10 DIAGNOSIS — B95.61 STAPHYLOCOCCUS AUREUS BACTEREMIA: ICD-10-CM

## 2020-10-10 DIAGNOSIS — N17.9 ACUTE RENAL FAILURE, UNSPECIFIED ACUTE RENAL FAILURE TYPE (HCC): ICD-10-CM

## 2020-10-10 DIAGNOSIS — D64.9 ANEMIA, UNSPECIFIED TYPE: ICD-10-CM

## 2020-10-10 LAB
ALBUMIN SERPL BCP-MCNC: 3.5 G/DL (ref 3.5–5)
ALP SERPL-CCNC: 134 U/L (ref 46–116)
ALT SERPL W P-5'-P-CCNC: 70 U/L (ref 12–78)
ANION GAP SERPL CALCULATED.3IONS-SCNC: 10 MMOL/L (ref 4–13)
AST SERPL W P-5'-P-CCNC: 52 U/L (ref 5–45)
BASOPHILS # BLD AUTO: 0.07 THOUSANDS/ΜL (ref 0–0.1)
BASOPHILS NFR BLD AUTO: 1 % (ref 0–1)
BILIRUB SERPL-MCNC: 0.4 MG/DL (ref 0.2–1)
BUN SERPL-MCNC: 20 MG/DL (ref 5–25)
CALCIUM SERPL-MCNC: 9.8 MG/DL (ref 8.3–10.1)
CHLORIDE SERPL-SCNC: 102 MMOL/L (ref 100–108)
CO2 SERPL-SCNC: 25 MMOL/L (ref 21–32)
CREAT SERPL-MCNC: 1.19 MG/DL (ref 0.6–1.3)
EOSINOPHIL # BLD AUTO: 0.44 THOUSAND/ΜL (ref 0–0.61)
EOSINOPHIL NFR BLD AUTO: 5 % (ref 0–6)
ERYTHROCYTE [DISTWIDTH] IN BLOOD BY AUTOMATED COUNT: 12.8 % (ref 11.6–15.1)
GFR SERPL CREATININE-BSD FRML MDRD: 67 ML/MIN/1.73SQ M
GLUCOSE P FAST SERPL-MCNC: 172 MG/DL (ref 65–99)
HCT VFR BLD AUTO: 35.2 % (ref 36.5–49.3)
HGB BLD-MCNC: 11.5 G/DL (ref 12–17)
IMM GRANULOCYTES # BLD AUTO: 0.04 THOUSAND/UL (ref 0–0.2)
IMM GRANULOCYTES NFR BLD AUTO: 1 % (ref 0–2)
LYMPHOCYTES # BLD AUTO: 2.68 THOUSANDS/ΜL (ref 0.6–4.47)
LYMPHOCYTES NFR BLD AUTO: 31 % (ref 14–44)
MCH RBC QN AUTO: 29.1 PG (ref 26.8–34.3)
MCHC RBC AUTO-ENTMCNC: 32.7 G/DL (ref 31.4–37.4)
MCV RBC AUTO: 89 FL (ref 82–98)
MONOCYTES # BLD AUTO: 0.75 THOUSAND/ΜL (ref 0.17–1.22)
MONOCYTES NFR BLD AUTO: 9 % (ref 4–12)
NEUTROPHILS # BLD AUTO: 4.66 THOUSANDS/ΜL (ref 1.85–7.62)
NEUTS SEG NFR BLD AUTO: 53 % (ref 43–75)
NRBC BLD AUTO-RTO: 0 /100 WBCS
PLATELET # BLD AUTO: 404 THOUSANDS/UL (ref 149–390)
PMV BLD AUTO: 10.6 FL (ref 8.9–12.7)
POTASSIUM SERPL-SCNC: 4.8 MMOL/L (ref 3.5–5.3)
PROT SERPL-MCNC: 8.9 G/DL (ref 6.4–8.2)
RBC # BLD AUTO: 3.95 MILLION/UL (ref 3.88–5.62)
SODIUM SERPL-SCNC: 137 MMOL/L (ref 136–145)
WBC # BLD AUTO: 8.64 THOUSAND/UL (ref 4.31–10.16)

## 2020-10-10 PROCEDURE — 36415 COLL VENOUS BLD VENIPUNCTURE: CPT

## 2020-10-10 PROCEDURE — 83540 ASSAY OF IRON: CPT

## 2020-10-10 PROCEDURE — 85025 COMPLETE CBC W/AUTO DIFF WBC: CPT

## 2020-10-10 PROCEDURE — 80053 COMPREHEN METABOLIC PANEL: CPT

## 2020-10-10 PROCEDURE — 87040 BLOOD CULTURE FOR BACTERIA: CPT

## 2020-10-11 LAB — IRON SERPL-MCNC: 131 UG/DL (ref 65–175)

## 2020-10-12 ENCOUNTER — TELEPHONE (OUTPATIENT)
Dept: FAMILY MEDICINE CLINIC | Facility: CLINIC | Age: 55
End: 2020-10-12

## 2020-10-13 ENCOUNTER — TELEPHONE (OUTPATIENT)
Dept: FAMILY MEDICINE CLINIC | Facility: CLINIC | Age: 55
End: 2020-10-13

## 2020-10-16 LAB
BACTERIA BLD CULT: NORMAL
BACTERIA BLD CULT: NORMAL

## 2020-10-19 ENCOUNTER — TELEPHONE (OUTPATIENT)
Dept: FAMILY MEDICINE CLINIC | Facility: CLINIC | Age: 55
End: 2020-10-19

## 2020-10-19 DIAGNOSIS — I10 HYPERTENSION, ESSENTIAL: Primary | ICD-10-CM

## 2020-10-19 RX ORDER — VERAPAMIL HYDROCHLORIDE 120 MG/1
120 CAPSULE, EXTENDED RELEASE ORAL
Qty: 30 CAPSULE | Refills: 3 | Status: SHIPPED | OUTPATIENT
Start: 2020-10-19 | End: 2021-10-08 | Stop reason: SDUPTHER

## 2020-10-22 ENCOUNTER — TELEPHONE (OUTPATIENT)
Dept: FAMILY MEDICINE CLINIC | Facility: CLINIC | Age: 55
End: 2020-10-22

## 2020-11-03 ENCOUNTER — PATIENT OUTREACH (OUTPATIENT)
Dept: FAMILY MEDICINE CLINIC | Facility: CLINIC | Age: 55
End: 2020-11-03

## 2020-11-18 ENCOUNTER — TELEPHONE (OUTPATIENT)
Dept: NEPHROLOGY | Facility: CLINIC | Age: 55
End: 2020-11-18

## 2020-11-25 ENCOUNTER — HOSPITAL ENCOUNTER (OUTPATIENT)
Dept: ULTRASOUND IMAGING | Facility: HOSPITAL | Age: 55
Discharge: HOME | End: 2020-11-25
Payer: COMMERCIAL

## 2020-11-25 DIAGNOSIS — C73 THYROID CANCER (HCC): ICD-10-CM

## 2020-11-25 PROCEDURE — 76536 US EXAM OF HEAD AND NECK: CPT

## 2020-12-03 ENCOUNTER — TELEPHONE (OUTPATIENT)
Dept: FAMILY MEDICINE CLINIC | Facility: CLINIC | Age: 55
End: 2020-12-03

## 2020-12-03 ENCOUNTER — OFFICE VISIT (OUTPATIENT)
Dept: FAMILY MEDICINE CLINIC | Facility: CLINIC | Age: 55
End: 2020-12-03
Payer: COMMERCIAL

## 2020-12-03 VITALS
BODY MASS INDEX: 29.71 KG/M2 | HEART RATE: 77 BPM | OXYGEN SATURATION: 98 % | TEMPERATURE: 97.9 F | SYSTOLIC BLOOD PRESSURE: 112 MMHG | HEIGHT: 76 IN | WEIGHT: 244 LBS | DIASTOLIC BLOOD PRESSURE: 70 MMHG

## 2020-12-03 DIAGNOSIS — S98.111A AMPUTATION OF RIGHT GREAT TOE (HCC): ICD-10-CM

## 2020-12-03 DIAGNOSIS — E11.42 TYPE 2 DIABETES MELLITUS WITH DIABETIC POLYNEUROPATHY, WITH LONG-TERM CURRENT USE OF INSULIN (HCC): ICD-10-CM

## 2020-12-03 DIAGNOSIS — D64.9 ANEMIA, UNSPECIFIED TYPE: ICD-10-CM

## 2020-12-03 DIAGNOSIS — K21.9 GASTROESOPHAGEAL REFLUX DISEASE WITHOUT ESOPHAGITIS: ICD-10-CM

## 2020-12-03 DIAGNOSIS — E78.5 HYPERLIPIDEMIA, UNSPECIFIED HYPERLIPIDEMIA TYPE: ICD-10-CM

## 2020-12-03 DIAGNOSIS — I73.9 PAD (PERIPHERAL ARTERY DISEASE) (HCC): ICD-10-CM

## 2020-12-03 DIAGNOSIS — E05.90 HYPERTHYROIDISM: Primary | ICD-10-CM

## 2020-12-03 DIAGNOSIS — R94.31 ABNORMAL EKG: ICD-10-CM

## 2020-12-03 DIAGNOSIS — Z23 FLU VACCINE NEED: ICD-10-CM

## 2020-12-03 DIAGNOSIS — Z01.818 PREOPERATIVE EXAMINATION: ICD-10-CM

## 2020-12-03 DIAGNOSIS — I10 HYPERTENSION, ESSENTIAL: ICD-10-CM

## 2020-12-03 DIAGNOSIS — Z79.4 TYPE 2 DIABETES MELLITUS WITH DIABETIC POLYNEUROPATHY, WITH LONG-TERM CURRENT USE OF INSULIN (HCC): ICD-10-CM

## 2020-12-03 DIAGNOSIS — G47.00 INSOMNIA, UNSPECIFIED TYPE: ICD-10-CM

## 2020-12-03 DIAGNOSIS — C73 THYROID CANCER (HCC): ICD-10-CM

## 2020-12-03 PROCEDURE — 90471 IMMUNIZATION ADMIN: CPT | Performed by: FAMILY MEDICINE

## 2020-12-03 PROCEDURE — 3725F SCREEN DEPRESSION PERFORMED: CPT | Performed by: FAMILY MEDICINE

## 2020-12-03 PROCEDURE — 3078F DIAST BP <80 MM HG: CPT | Performed by: FAMILY MEDICINE

## 2020-12-03 PROCEDURE — 93000 ELECTROCARDIOGRAM COMPLETE: CPT | Performed by: FAMILY MEDICINE

## 2020-12-03 PROCEDURE — 3008F BODY MASS INDEX DOCD: CPT | Performed by: FAMILY MEDICINE

## 2020-12-03 PROCEDURE — 90682 RIV4 VACC RECOMBINANT DNA IM: CPT | Performed by: FAMILY MEDICINE

## 2020-12-03 PROCEDURE — 1036F TOBACCO NON-USER: CPT | Performed by: FAMILY MEDICINE

## 2020-12-03 PROCEDURE — 99244 OFF/OP CNSLTJ NEW/EST MOD 40: CPT | Performed by: FAMILY MEDICINE

## 2020-12-03 PROCEDURE — 3074F SYST BP LT 130 MM HG: CPT | Performed by: FAMILY MEDICINE

## 2020-12-03 RX ORDER — ZOLPIDEM TARTRATE 10 MG/1
10 TABLET ORAL
Qty: 30 TABLET | Refills: 3 | Status: SHIPPED | OUTPATIENT
Start: 2020-12-03

## 2020-12-03 RX ORDER — INSULIN GLULISINE 100 [IU]/ML
INJECTION, SOLUTION SUBCUTANEOUS
COMMUNITY
End: 2020-12-18 | Stop reason: SDUPTHER

## 2020-12-03 RX ORDER — METHIMAZOLE 5 MG/1
5 TABLET ORAL DAILY
Qty: 30 TABLET | Refills: 0 | Status: SHIPPED | OUTPATIENT
Start: 2020-12-03 | End: 2021-02-24 | Stop reason: SDUPTHER

## 2020-12-03 RX ORDER — ZOLPIDEM TARTRATE 10 MG/1
10 TABLET ORAL
Qty: 30 TABLET | Refills: 1 | Status: CANCELLED | OUTPATIENT
Start: 2020-12-03

## 2020-12-03 RX ORDER — METHIMAZOLE 5 MG/1
TABLET ORAL
COMMUNITY
Start: 2020-11-09 | End: 2020-12-03 | Stop reason: SDUPTHER

## 2020-12-03 RX ORDER — METHIMAZOLE 5 MG/1
5 TABLET ORAL DAILY
Qty: 30 TABLET | Refills: 0 | Status: CANCELLED | OUTPATIENT
Start: 2020-12-03

## 2020-12-04 ENCOUNTER — PATIENT OUTREACH (OUTPATIENT)
Dept: FAMILY MEDICINE CLINIC | Facility: CLINIC | Age: 55
End: 2020-12-04

## 2020-12-07 ENCOUNTER — TELEPHONE (OUTPATIENT)
Dept: FAMILY MEDICINE CLINIC | Facility: CLINIC | Age: 55
End: 2020-12-07

## 2020-12-08 ENCOUNTER — TELEPHONE (OUTPATIENT)
Dept: FAMILY MEDICINE CLINIC | Facility: CLINIC | Age: 55
End: 2020-12-08

## 2020-12-08 DIAGNOSIS — C73 THYROID CANCER (HCC): ICD-10-CM

## 2020-12-08 PROCEDURE — U0003 INFECTIOUS AGENT DETECTION BY NUCLEIC ACID (DNA OR RNA); SEVERE ACUTE RESPIRATORY SYNDROME CORONAVIRUS 2 (SARS-COV-2) (CORONAVIRUS DISEASE [COVID-19]), AMPLIFIED PROBE TECHNIQUE, MAKING USE OF HIGH THROUGHPUT TECHNOLOGIES AS DESCRIBED BY CMS-2020-01-R: HCPCS | Performed by: OTOLARYNGOLOGY

## 2020-12-09 LAB — SARS-COV-2 RNA SPEC QL NAA+PROBE: NOT DETECTED

## 2020-12-10 ENCOUNTER — HOSPITAL ENCOUNTER (OUTPATIENT)
Dept: NON INVASIVE DIAGNOSTICS | Facility: CLINIC | Age: 55
Discharge: HOME | End: 2020-12-10
Payer: COMMERCIAL

## 2020-12-10 ENCOUNTER — LAB (OUTPATIENT)
Dept: LAB | Facility: HOSPITAL | Age: 55
End: 2020-12-10
Attending: OTOLARYNGOLOGY
Payer: COMMERCIAL

## 2020-12-10 DIAGNOSIS — C73 THYROID CANCER (HCC): ICD-10-CM

## 2020-12-10 DIAGNOSIS — R94.31 ABNORMAL EKG: ICD-10-CM

## 2020-12-10 LAB
ANION GAP SERPL CALCULATED.3IONS-SCNC: 11 MMOL/L (ref 4–13)
ARRHY DURING EX: NORMAL
BASOPHILS # BLD AUTO: 0.08 THOUSANDS/ΜL (ref 0–0.1)
BASOPHILS NFR BLD AUTO: 1 % (ref 0–1)
BUN SERPL-MCNC: 21 MG/DL (ref 5–25)
CALCIUM SERPL-MCNC: 9.1 MG/DL (ref 8.3–10.1)
CHEST PAIN STATEMENT: NORMAL
CHLORIDE SERPL-SCNC: 102 MMOL/L (ref 100–108)
CO2 SERPL-SCNC: 26 MMOL/L (ref 21–32)
CREAT SERPL-MCNC: 1.05 MG/DL (ref 0.6–1.3)
EOSINOPHIL # BLD AUTO: 0.52 THOUSAND/ΜL (ref 0–0.61)
EOSINOPHIL NFR BLD AUTO: 6 % (ref 0–6)
ERYTHROCYTE [DISTWIDTH] IN BLOOD BY AUTOMATED COUNT: 13.9 % (ref 11.6–15.1)
EST. AVERAGE GLUCOSE BLD GHB EST-MCNC: 137 MG/DL
GFR SERPL CREATININE-BSD FRML MDRD: 78 ML/MIN/1.73SQ M
GLUCOSE P FAST SERPL-MCNC: 99 MG/DL (ref 65–99)
HBA1C MFR BLD: 6.4 %
HCT VFR BLD AUTO: 41 % (ref 36.5–49.3)
HGB BLD-MCNC: 13.7 G/DL (ref 12–17)
IMM GRANULOCYTES # BLD AUTO: 0.03 THOUSAND/UL (ref 0–0.2)
IMM GRANULOCYTES NFR BLD AUTO: 0 % (ref 0–2)
LYMPHOCYTES # BLD AUTO: 2.67 THOUSANDS/ΜL (ref 0.6–4.47)
LYMPHOCYTES NFR BLD AUTO: 29 % (ref 14–44)
MAX DIASTOLIC BP: 80 MMHG
MAX HEART RATE: 83 BPM
MAX PREDICTED HEART RATE: 163 BPM
MAX. SYSTOLIC BP: 136 MMHG
MCH RBC QN AUTO: 29.6 PG (ref 26.8–34.3)
MCHC RBC AUTO-ENTMCNC: 33.4 G/DL (ref 31.4–37.4)
MCV RBC AUTO: 89 FL (ref 82–98)
MONOCYTES # BLD AUTO: 1.01 THOUSAND/ΜL (ref 0.17–1.22)
MONOCYTES NFR BLD AUTO: 11 % (ref 4–12)
NEUTROPHILS # BLD AUTO: 4.95 THOUSANDS/ΜL (ref 1.85–7.62)
NEUTS SEG NFR BLD AUTO: 53 % (ref 43–75)
NRBC BLD AUTO-RTO: 0 /100 WBCS
PLATELET # BLD AUTO: 239 THOUSANDS/UL (ref 149–390)
PMV BLD AUTO: 11.1 FL (ref 8.9–12.7)
POTASSIUM SERPL-SCNC: 4.1 MMOL/L (ref 3.5–5.3)
PROTOCOL NAME: NORMAL
RBC # BLD AUTO: 4.63 MILLION/UL (ref 3.88–5.62)
REASON FOR TERMINATION: NORMAL
SODIUM SERPL-SCNC: 139 MMOL/L (ref 136–145)
TARGET HR FORMULA: NORMAL
TEST INDICATION: NORMAL
TIME IN EXERCISE PHASE: NORMAL
WBC # BLD AUTO: 9.26 THOUSAND/UL (ref 4.31–10.16)

## 2020-12-10 PROCEDURE — G1004 CDSM NDSC: HCPCS

## 2020-12-10 PROCEDURE — A9502 TC99M TETROFOSMIN: HCPCS

## 2020-12-10 PROCEDURE — 3044F HG A1C LEVEL LT 7.0%: CPT | Performed by: FAMILY MEDICINE

## 2020-12-10 PROCEDURE — 78452 HT MUSCLE IMAGE SPECT MULT: CPT

## 2020-12-10 PROCEDURE — 36415 COLL VENOUS BLD VENIPUNCTURE: CPT

## 2020-12-10 PROCEDURE — 93016 CV STRESS TEST SUPVJ ONLY: CPT | Performed by: INTERNAL MEDICINE

## 2020-12-10 PROCEDURE — 85025 COMPLETE CBC W/AUTO DIFF WBC: CPT

## 2020-12-10 PROCEDURE — 93018 CV STRESS TEST I&R ONLY: CPT | Performed by: INTERNAL MEDICINE

## 2020-12-10 PROCEDURE — 83036 HEMOGLOBIN GLYCOSYLATED A1C: CPT

## 2020-12-10 PROCEDURE — 78452 HT MUSCLE IMAGE SPECT MULT: CPT | Performed by: INTERNAL MEDICINE

## 2020-12-10 PROCEDURE — 80048 BASIC METABOLIC PNL TOTAL CA: CPT

## 2020-12-10 PROCEDURE — 93017 CV STRESS TEST TRACING ONLY: CPT

## 2020-12-10 RX ADMIN — REGADENOSON 0.4 MG: 0.08 INJECTION, SOLUTION INTRAVENOUS at 13:14

## 2020-12-11 ENCOUNTER — TELEPHONE (OUTPATIENT)
Dept: FAMILY MEDICINE CLINIC | Facility: CLINIC | Age: 55
End: 2020-12-11

## 2020-12-18 DIAGNOSIS — Z79.4 TYPE 2 DIABETES MELLITUS WITH DIABETIC POLYNEUROPATHY, WITH LONG-TERM CURRENT USE OF INSULIN (HCC): Primary | ICD-10-CM

## 2020-12-18 DIAGNOSIS — E11.42 TYPE 2 DIABETES MELLITUS WITH DIABETIC POLYNEUROPATHY, WITH LONG-TERM CURRENT USE OF INSULIN (HCC): Primary | ICD-10-CM

## 2020-12-19 RX ORDER — INSULIN GLULISINE 100 [IU]/ML
20 INJECTION, SOLUTION SUBCUTANEOUS
Qty: 60 ML | Refills: 5 | Status: SHIPPED | OUTPATIENT
Start: 2020-12-19 | End: 2021-10-08 | Stop reason: SDUPTHER

## 2021-01-06 DIAGNOSIS — N17.0 ACUTE KIDNEY INJURY (AKI) WITH ACUTE TUBULAR NECROSIS (ATN) (HCC): Primary | ICD-10-CM

## 2021-01-06 PROCEDURE — 3066F NEPHROPATHY DOC TX: CPT | Performed by: INTERNAL MEDICINE

## 2021-01-11 ENCOUNTER — TELEPHONE (OUTPATIENT)
Dept: NEPHROLOGY | Facility: CLINIC | Age: 56
End: 2021-01-11

## 2021-01-12 ENCOUNTER — OFFICE VISIT (OUTPATIENT)
Dept: NEPHROLOGY | Facility: CLINIC | Age: 56
End: 2021-01-12
Payer: COMMERCIAL

## 2021-01-12 VITALS
TEMPERATURE: 97.2 F | BODY MASS INDEX: 29.64 KG/M2 | HEART RATE: 68 BPM | RESPIRATION RATE: 16 BRPM | HEIGHT: 77 IN | WEIGHT: 251 LBS | DIASTOLIC BLOOD PRESSURE: 80 MMHG | SYSTOLIC BLOOD PRESSURE: 120 MMHG

## 2021-01-12 DIAGNOSIS — Z79.4 TYPE 2 DIABETES MELLITUS WITH DIABETIC POLYNEUROPATHY, WITH LONG-TERM CURRENT USE OF INSULIN (HCC): ICD-10-CM

## 2021-01-12 DIAGNOSIS — E11.42 TYPE 2 DIABETES MELLITUS WITH DIABETIC POLYNEUROPATHY, WITH LONG-TERM CURRENT USE OF INSULIN (HCC): ICD-10-CM

## 2021-01-12 DIAGNOSIS — R80.1 PERSISTENT PROTEINURIA: ICD-10-CM

## 2021-01-12 DIAGNOSIS — N17.0 ACUTE KIDNEY INJURY (AKI) WITH ACUTE TUBULAR NECROSIS (ATN) (HCC): Primary | ICD-10-CM

## 2021-01-12 PROCEDURE — 3079F DIAST BP 80-89 MM HG: CPT | Performed by: INTERNAL MEDICINE

## 2021-01-12 PROCEDURE — 3074F SYST BP LT 130 MM HG: CPT | Performed by: INTERNAL MEDICINE

## 2021-01-12 PROCEDURE — 3008F BODY MASS INDEX DOCD: CPT | Performed by: INTERNAL MEDICINE

## 2021-01-12 PROCEDURE — 1036F TOBACCO NON-USER: CPT | Performed by: INTERNAL MEDICINE

## 2021-01-12 PROCEDURE — 99213 OFFICE O/P EST LOW 20 MIN: CPT | Performed by: INTERNAL MEDICINE

## 2021-01-12 NOTE — ASSESSMENT & PLAN NOTE
Lab Results   Component Value Date    HGBA1C 6 4 (H) 12/10/2020    Diabetes seems to be getting better control    Importance of diabetic control and kidney disease discussed with him

## 2021-01-12 NOTE — PROGRESS NOTES
NEPHROLOGY OFFICE FOLLOW UP  Tiffany Acevedo 60 y o  male MRN: [id number]    Encounter: [id number] 1/12/2021    REASON FOR VISIT: Tiffany Acevedo is a 60 y o  male who is here on 1/12/2021 for Follow-up and Acute Renal Failure    HPI:    Patient came in today for follow-up of acute kidney injury  He is doing quite well  He is osteomyelitis of the left foot has been healed and he is off antibiotic    No chest pain no palpitation or shortness of breath    His diabetes seems to be reasonably well control according to him      REVIEW OF SYSTEMS:    Review of Systems   Constitutional: Negative for activity change and fatigue  HENT: Negative for congestion and ear discharge  Eyes: Negative for photophobia and pain  Respiratory: Negative for apnea and choking  Cardiovascular: Negative for chest pain and palpitations  Gastrointestinal: Negative for abdominal distention and blood in stool  Endocrine: Negative for heat intolerance and polyphagia  Genitourinary: Negative for flank pain and urgency  Musculoskeletal: Negative for neck pain and neck stiffness  Skin: Negative for color change and wound  Allergic/Immunologic: Negative for food allergies and immunocompromised state  Neurological: Negative for seizures and facial asymmetry  Hematological: Negative for adenopathy  Does not bruise/bleed easily  Psychiatric/Behavioral: Negative for self-injury and suicidal ideas           PAST MEDICAL HISTORY:  Past Medical History:   Diagnosis Date    Chronic kidney disease     Diabetes mellitus ([facility] )     Hyperlipidemia     Hypertension     Thyroid cancer ([facility] )        PAST SURGICAL HISTORY:  Past Surgical History:   Procedure Laterality Date    FOOT SURGERY Right 2014    IR AORTAGRAM WITH RUN-OFF  8/6/2020    IR TUNNELED CENTRAL LINE PLACEMENT  9/8/2020    IR TUNNELED CENTRAL LINE REMOVAL  9/28/2020    CA AMPUTATION FOOT,TRANSMETATARSAL Right 9/3/2020    Procedure: AMPUTATION TRANSMETATARSAL (TMA);  Surgeon: Libby Swain DPM;  Location: [facility];  Service: Podiatry    TOE AMPUTATION Right 8/11/2020    Procedure: AMPUTATION TOE;  Surgeon: Libby Swain DPM;  Location: [facility];  Service: [address] THYROID BIOPSY  7/2/2020       SOCIAL HISTORY:  Social History     Substance and Sexual Activity   Alcohol Use Not Currently    Alcohol/week: 8 0 standard drinks    Types: 8 Cans of beer per week    Comment: last drink was 10 days ago     Social History     Substance and Sexual Activity   Drug Use Yes    Types: Marijuana    Comment: occassionally     Social History     Tobacco Use   Smoking Status Never Smoker   Smokeless Tobacco Never Used       FAMILY HISTORY:  Family History   Problem Relation Age of Onset    Cancer Mother     Hypertension Father        MEDICATIONS:    Current Outpatient Medications:     aspirin 81 mg chewable tablet, Chew 1 tablet (81 mg total) daily, Disp: 30 tablet, Rfl: 0    atorvastatin (LIPITOR) 40 mg tablet, Take 1 tablet (40 mg total) by mouth daily with dinner, Disp: 30 tablet, Rfl: 3    BD PEN NEEDLE DEV U/F 32G X 4 MM MISC, Inject under the skin 4 (four) times a day, Disp: 400 each, Rfl: 1    Blood Glucose Monitoring Suppl (ONE TOUCH ULTRA 2) w/Device KIT, by Does not apply route 2 (two) times a day, Disp: 1 each, Rfl: 0    clopidogrel (PLAVIX) 75 mg tablet, Take 1 tablet (75 mg total) by mouth daily, Disp: 30 tablet, Rfl: 3    insulin glargine (LANTUS) 100 units/mL subcutaneous injection, Inject 30 Units under the skin daily at bedtime, Disp:  , Rfl: 0    insulin glulisine (Apidra) 100 units/mL injection, Inject 20 Units under the skin 3 (three) times a day with meals, Disp: 60 mL, Rfl: 5    methimazole (TAPAZOLE) 5 mg tablet, Take 1 tablet (5 mg total) by mouth daily, Disp: 30 tablet, Rfl: 0    omeprazole (PriLOSEC) 40 MG capsule, TAKE 1 CAPSULE DAILY, Disp: 90 capsule, Rfl: 3    verapamil (VERELAN PM) 120 MG 24 hr capsule, Take 1 capsule (120 mg total) by mouth daily at bedtime, Disp: 30 capsule, Rfl: 3    zolpidem (AMBIEN) 10 mg tablet, Take 1 tablet (10 mg total) by mouth daily at bedtime as needed for sleep, Disp: 30 tablet, Rfl: 3    PHYSICAL EXAM:  Vitals:    01/12/21 1117   BP: 120/80   BP Location: Right arm   Patient Position: Sitting   Pulse: 68   Resp: 16   Temp: (!) 97 2 °F (36 2 °C)   TempSrc: Temporal   Weight: 114 kg (251 lb)   Height: 6' 4 5" (1 943 m)     Body mass index is 30 15 kg/m²  Physical Exam  Constitutional:       General: He is not in acute distress  Appearance: He is well-developed  HENT:      Head: Normocephalic  Eyes:      General: No scleral icterus  Conjunctiva/sclera: Conjunctivae normal    Neck:      Musculoskeletal: Neck supple  Vascular: No JVD  Cardiovascular:      Rate and Rhythm: Normal rate  Heart sounds: Normal heart sounds  Pulmonary:      Effort: Pulmonary effort is normal       Breath sounds: No wheezing  Abdominal:      Palpations: Abdomen is soft  Tenderness: There is no abdominal tenderness  Musculoskeletal: Normal range of motion  Skin:     General: Skin is warm  Findings: No rash  Neurological:      Mental Status: He is alert and oriented to person, place, and time  Psychiatric:         Behavior: Behavior normal          LAB RESULTS:  Results for orders placed or performed during the hospital encounter of 12/10/20   Stress strip   Result Value Ref Range    Protocol Name LEXISCAN-SIT     Time In Exercise Phase 00:03:00     MAX   SYSTOLIC  mmHg    Max Diastolic Bp 80 mmHg    Max Heart Rate 83 BPM    Max Predicted Heart Rate 163 BPM    Reason for Termination Protocol Complete     Test Indication ABN EKG     Target Hr Formular (220 - Age)*85%     Arrhy During Ex ventricular premature beats-isolated     ECG Interp Before Ex      ECG Interp during Ex      Ex Summary Comment      Chest Pain Statement none     Overall Hr Response To Exercise      Overall BP Response To Exercise         ASSESSMENT and PLAN:      Acute kidney injury (MARKELL) with acute tubular necrosis (ATN) ([facility])  Renal function has improved and seems to be at baseline  Likely because of ATN with antibiotic and sepsis  He still will be at high risk for developing ATN or acute kidney injury again because of prior history and renal risk factor of diabetes  That was discussed with him    Persistent proteinuria  Patient had proteinuria which may suggest diabetic nephropathy  Advised to get urine test done to confirm it  Type 2 diabetes mellitus with diabetic polyneuropathy, with long-term current use of insulin ([facility])    Lab Results   Component Value Date    HGBA1C 6 4 (H) 12/10/2020    Diabetes seems to be getting better control  Importance of diabetic control and kidney disease discussed with him    I will see him back in 6 months  He will get urine test now to assess proteinuria and get blood test and urine test in 6 months          Portions of the record may have been created with voice recognition software  Occasional wrong word or "sound a like" substitutions may have occurred due to the inherent limitations of voice recognition software  Read the chart carefully and recognize, using context, where substitutions have occurred  If you have any questions, please contact the dictating provider

## 2021-01-12 NOTE — ASSESSMENT & PLAN NOTE
Renal function has improved and seems to be at baseline  Likely because of ATN with antibiotic and sepsis  He still will be at high risk for developing ATN or acute kidney injury again because of prior history and renal risk factor of diabetes    That was discussed with him

## 2021-01-12 NOTE — PATIENT INSTRUCTIONS
Chronic Kidney Disease   AMBULATORY CARE:   Chronic kidney disease (CKD)  is the gradual and permanent loss of kidney function  Normally, the kidneys remove fluid, chemicals, and waste from your blood  These wastes are turned into urine by your kidneys  CKD may worsen over time and lead to kidney failure  Common signs and symptoms include the following:   · Changes in how often you need to urinate    · Swelling in your arms, legs, or feet    · Shortness of breath    · Fatigue or weakness    · Bad or bitter taste in your mouth    · Nausea, vomiting, or loss of appetite    Call your local emergency number (911 in the [address] [facility]) if:   · You have a seizure  · You have shortness of breath  Call your doctor or nephrologist if:   · You are confused and very drowsy  · You suddenly gain or lose more weight than your healthcare provider has told you is okay  · You have itchy skin or a rash  · You urinate more or less than you normally do  · You have blood in your urine  · You have nausea and are vomiting  · You have fatigue or muscle weakness  · You have hiccups that will not stop  · You have questions or concerns about your condition or care  How CKD is diagnosed:  CKD has 5 stages  Your healthcare provider will use results from the following tests to find the stage of CKD you have:  · Blood and urine tests  show how well your kidneys are working  They may also show the cause of your CKD  · Ultrasound, CT scan, or MRI  pictures may be used to check your kidneys  You may be given contrast liquid to help your kidneys show up better in the pictures  Tell the healthcare provider if you have ever had an allergic reaction to contrast liquid  Do not enter the MRI room with anything metal  Metal can cause serious injury  Tell the healthcare provider if you have any metal in or on your body  · A biopsy  is a procedure to remove a small piece of tissue from your kidney   It is done to find the cause of your CKD  Treatment  can help control signs and symptoms, and prevent a worse stage of CKD  Your care team may include specialists, such as a dietitian or a heart specialist  This depends on the stage of your CKD and if you have other health conditions to manage  Healthcare providers will work with you to create a plan based on your decisions for treatment  Your treatment plan may include any of the following:  · Medicines  may be given to decrease your blood pressure and get rid of extra fluid  You may also receive medicine to manage health conditions that may occur with CKD, such as anemia, diabetes, and heart disease  · Dialysis  is a treatment to remove chemicals and waste from your blood when your kidneys can no longer do this  · Surgery  may be needed to create an arteriovenous fistula (AVF) in your arm or insert a catheter into your abdomen  This is done so you can receive dialysis  · A kidney transplant  may be done if your CKD becomes severe  What you can do to manage CKD: Management may include making some lifestyle changes  Tell your healthcare provider if you have any concerns about being able to make the changes  He or she can help you find solutions, including working with specialists  Ask for help creating a plan to break large goals into smaller steps  Your plan may include any of the following:  · Manage other health conditions  Your healthcare provider will work with you to make a care plan that meets your needs  You will be checked regularly for heart disease or other conditions that can make CKD worse, such as diabetes  Your blood pressure will be closely monitored  You will also get a target blood pressure and help making a plan to reach your target  This may include taking your blood pressure at home  · Maintain a healthy weight  Extra weight can strain your kidneys  Ask what a healthy weight is for you   Your provider can help you create a weight loss plan if you are overweight  · Create an exercise plan  Regular exercise can help you manage CKD, high blood pressure, and diabetes  Exercise also helps control weight  Your provider can help you create exercise goals and a plan to reach those goals  For example, your goal may be to exercise for 30 minutes in a day  Your plan can include breaking exercise into 10 minute sessions, 3 times during the day  · Create a healthy eating plan  Your provider may tell you to eat food low in sodium (salt), potassium, phosphorus, or protein  A dietitian can help you plan meals if needed  Ask how much liquid to drink each day and which liquids are best for you  · Limit alcohol as directed  Alcohol can cause fluid retention and can affect your kidneys  Ask how much alcohol is safe for you  A drink of alcohol is 12 ounces of beer, 5 ounces of wine, or 1½ ounces of liquor  · Do not smoke  Nicotine and other chemicals in cigarettes and cigars can cause kidney damage  Ask your provider for information if you currently smoke and need help to quit  E-cigarettes or smokeless tobacco still contain nicotine  Talk to your provider before you use these products  · Ask about over-the-counter medicines  Medicines such as NSAIDs and laxatives may harm your kidneys  Some cough and cold medicines can raise your blood pressure  Always ask if a medicine is safe before you take it  · Ask about vaccines you may need  Infections such as pneumonia, influenza, and hepatitis can be more harmful or more likely to occur in a person who has CKD  Vaccines lower your risk for infection  Follow up with your doctor as directed: You will need to return for tests to monitor your kidney and nerve function, and your parathyroid hormone level  Your medicines may be changed, based on certain test results  You may also be referred to a nephrologist (kidney specialist)  Write down your questions so you remember to ask them during your visits    © Copyright [address] Information is for [employer] use only and may not be sold, redistributed or otherwise used for commercial purposes  All illustrations and images included in CareNotes® are the copyrighted property of [employer]  or [employer] Brennan  The above information is an  only  It is not intended as medical advice for individual conditions or treatments  Talk to your doctor, nurse or pharmacist before following any medical regimen to see if it is safe and effective for you

## 2021-01-13 ENCOUNTER — PATIENT OUTREACH (OUTPATIENT)
Dept: FAMILY MEDICINE CLINIC | Facility: CLINIC | Age: 56
End: 2021-01-13

## 2021-01-13 NOTE — PROGRESS NOTES
3rd message left for patient  Will close to outpatient care management at this time  Pt has followed up with specialists as directed

## 2021-01-26 NOTE — H&P (VIEW-ONLY)
[facility] Otolaryngology Follow up visit      CC: thyroid cancer      Time interval of problem since last visit:      Pertinent interval elements of the history:  Doing well  No changes  Here to review US    Review of any relevant imaging:  US thyroid and lymph node mapping reviewed and interpreted personally    Interval Review of systems:  General: no weight change, normal energy  CV: no palpitations or chest pain  Pulm: no shortness of breath    Interval Social History:  Social History     Socioeconomic History    Marital status: Single     Spouse name: Not on file    Number of children: Not on file    Years of education: Not on file    Highest education level: Not on file   Occupational History    Not on file   Social Needs    Financial resource strain: Not on file    Food insecurity     Worry: Not on file     Inability: Not on file    Transportation needs     Medical: Not on file     Non-medical: Not on file   Tobacco Use    Smoking status: Never Smoker    Smokeless tobacco: Never Used   Substance and Sexual Activity    Alcohol use: Not Currently     Alcohol/week: 8 0 standard drinks     Types: 8 Cans of beer per week     Comment: last drink was 10 days ago    Drug use: Yes     Types: Marijuana     Comment: occassionally    Sexual activity: Not on file   Lifestyle    Physical activity     Days per week: Not on file     Minutes per session: Not on file    Stress: Not on file   Relationships    Social connections     Talks on phone: Not on file     Gets together: Not on file     Attends Restorationism service: Not on file     Active member of club or organization: Not on file     Attends meetings of clubs or organizations: Not on file     Relationship status: Not on file    Intimate partner violence     Fear of current or ex partner: Not on file     Emotionally abused: Not on file     Physically abused: Not on file     Forced sexual activity: Not on file   Other Topics Concern    Not on file   Social History Narrative    Light caffeine     Wears seatbelt daily    Lives with girlfriend       Interval Physical Examination:  Temp 97 6 °F (36 4 °C)   Ht 6' 4 5" (1 943 m)   Wt 114 kg (251 lb)   BMI 30 16 kg/m²   NAD  Stable MNG  Voice strong  CTAB  RRR    Interval endoscopy:   Laryngoscopy          Performed by Joe Armando MD      Authorized by Joe Armando MD        Consent: Verbal consent obtained  Consent given by: patient  Patient understanding: patient states understanding of the procedure being performed        Local anesthesia used: yes      Anesthesia    Local anesthesia used: yes  Local Anesthetic: topical anesthetic      Sedation    Patient sedated: no           Culture: no   Procedure Details    Procedure Notes:  Nasopharynx unremarkable, with normal eustachian tube orifices and normal Fossa of Rosenmuller bilaterally  Posterior nasopharyngeal and oropharyngeal walls normal  Oropharyngeal mucosa moist, no masses or lesions  Tongue base normal without masses or lesions  Vallecula and pyriform sinuses clear, without pooling of secretions  Epiglottis, aryepiglottic folds and remainder of supraglottis well-appearing  True vocal folds mobile, without masses or lesions, normal glottic chink  Patient tolerance: Patient tolerated the procedure well with no immediate complications             Assessment:  1  Thyroid cancer ([address] )         Plan:  1  Normal FFL  US lymph node mapping reviewed and normal    We discussed total thyroidectomy as well as the risks, benefits, and alternatives of surgery    Risks including but are not limited to:    Bleeding, anesthesia, infection, hematoma, airway issues, RNL injury, EBSLN injury, voice changes (pitch, projection, severe hoarseness), swallowing changes, scarring, need for further treatment including radioactive iodine    Surgery next week  Overnight stay      Over 1/2 of the 30 minute visit was spent in medical-decision making, counseling, and coordination of care

## 2021-02-02 ENCOUNTER — ANESTHESIA EVENT (OUTPATIENT)
Dept: PERIOP | Facility: HOSPITAL | Age: 56
End: 2021-02-02
Payer: COMMERCIAL

## 2021-02-02 NOTE — ANESTHESIA PREPROCEDURE EVALUATION
Procedure:  TOTAL THYROIDECTOMY (N/A Neck)    Relevant Problems   CARDIO   (+) Hyperlipidemia   (+) Hypertension, essential      ENDO   (+) Hyperthyroidism   (+) Type 2 diabetes mellitus with diabetic polyneuropathy, with long-term current use of insulin (HCC)      GI/HEPATIC   (+) Gastroesophageal reflux disease without esophagitis      /RENAL   (+) Acute kidney injury ([place]KE[place]) with acute tubular necrosis (ATN) (HCC)      HEMATOLOGY   (+) Anemia      NEURO/PSYCH   (+) History of nonadherence to medical treatment      Other   (+) Hypotension   (+) Osteomyelitis (HCC)   (+) Phantom 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 (HCC)    LEFT VENTRICLE:  Systolic function was at the lower limits of normal  LVEF around 50%     Physical Exam    Airway    Mallampati score: II  TM Distance: >3 FB  Neck ROM: full     Dental       Cardiovascular      Pulmonary      Other Findings        Anesthesia Plan  ASA Score- 3     Anesthesia Type- general with ASA Monitors  Additional Monitors:   Airway Plan: ETT  Plan Factors-    Chart reviewed  Induction- intravenous  Postoperative Plan-     Informed Consent- Anesthetic plan and risks discussed with patient  I personally reviewed this patient with the CRNA  Discussed and agreed on the Anesthesia Plan with the CRNA  Chandana Mayen

## 2021-02-03 ENCOUNTER — HOSPITAL ENCOUNTER (OUTPATIENT)
Facility: HOSPITAL | Age: 56
Setting detail: OUTPATIENT SURGERY
Discharge: HOME | End: 2021-02-03
Attending: OTOLARYNGOLOGY | Admitting: OTOLARYNGOLOGY
Payer: COMMERCIAL

## 2021-02-03 ENCOUNTER — ANESTHESIA (OUTPATIENT)
Dept: PERIOP | Facility: HOSPITAL | Age: 56
End: 2021-02-03
Payer: COMMERCIAL

## 2021-02-03 VITALS
TEMPERATURE: 98.2 F | HEART RATE: 93 BPM | HEIGHT: 77 IN | OXYGEN SATURATION: 95 % | DIASTOLIC BLOOD PRESSURE: 95 MMHG | WEIGHT: 246 LBS | RESPIRATION RATE: 18 BRPM | BODY MASS INDEX: 29.05 KG/M2 | SYSTOLIC BLOOD PRESSURE: 147 MMHG

## 2021-02-03 VITALS — HEART RATE: 77 BPM

## 2021-02-03 DIAGNOSIS — C73 THYROID CANCER (HCC): ICD-10-CM

## 2021-02-03 DIAGNOSIS — I73.9 PAD (PERIPHERAL ARTERY DISEASE) (HCC): ICD-10-CM

## 2021-02-03 LAB
FLUAV RNA RESP QL NAA+PROBE: NEGATIVE
FLUBV RNA RESP QL NAA+PROBE: NEGATIVE
GLUCOSE SERPL-MCNC: 142 MG/DL (ref 65–140)
GLUCOSE SERPL-MCNC: 147 MG/DL (ref 65–140)
RSV RNA RESP QL NAA+PROBE: NEGATIVE
SARS-COV-2 RNA RESP QL NAA+PROBE: NEGATIVE

## 2021-02-03 PROCEDURE — 0241U HB NFCT DS VIR RESP RNA 4 TRGT: CPT | Performed by: OTOLARYNGOLOGY

## 2021-02-03 PROCEDURE — 88305 TISSUE EXAM BY PATHOLOGIST: CPT | Performed by: PATHOLOGY

## 2021-02-03 PROCEDURE — 82948 REAGENT STRIP/BLOOD GLUCOSE: CPT

## 2021-02-03 PROCEDURE — 88331 PATH CONSLTJ SURG 1 BLK 1SPC: CPT | Performed by: PATHOLOGY

## 2021-02-03 PROCEDURE — 60240 REMOVAL OF THYROID: CPT | Performed by: OTOLARYNGOLOGY

## 2021-02-03 PROCEDURE — 88307 TISSUE EXAM BY PATHOLOGIST: CPT | Performed by: PATHOLOGY

## 2021-02-03 RX ORDER — SODIUM CHLORIDE, SODIUM LACTATE, POTASSIUM CHLORIDE, CALCIUM CHLORIDE 600; 310; 30; 20 MG/100ML; MG/100ML; MG/100ML; MG/100ML
50 INJECTION, SOLUTION INTRAVENOUS CONTINUOUS
Status: DISCONTINUED | OUTPATIENT
Start: 2021-02-03 | End: 2021-02-03 | Stop reason: HOSPADM

## 2021-02-03 RX ORDER — MIDAZOLAM HYDROCHLORIDE 2 MG/2ML
INJECTION, SOLUTION INTRAMUSCULAR; INTRAVENOUS AS NEEDED
Status: DISCONTINUED | OUTPATIENT
Start: 2021-02-03 | End: 2021-02-03

## 2021-02-03 RX ORDER — SUCCINYLCHOLINE/SOD CL,ISO/PF 100 MG/5ML
SYRINGE (ML) INTRAVENOUS AS NEEDED
Status: DISCONTINUED | OUTPATIENT
Start: 2021-02-03 | End: 2021-02-03

## 2021-02-03 RX ORDER — HYDROMORPHONE HCL/PF 1 MG/ML
0.5 SYRINGE (ML) INJECTION
Status: DISCONTINUED | OUTPATIENT
Start: 2021-02-03 | End: 2021-02-03 | Stop reason: HOSPADM

## 2021-02-03 RX ORDER — LABETALOL 20 MG/4 ML (5 MG/ML) INTRAVENOUS SYRINGE
10
Status: ACTIVE | OUTPATIENT
Start: 2021-02-03 | End: 2021-02-03

## 2021-02-03 RX ORDER — ONDANSETRON 2 MG/ML
INJECTION INTRAMUSCULAR; INTRAVENOUS AS NEEDED
Status: DISCONTINUED | OUTPATIENT
Start: 2021-02-03 | End: 2021-02-03

## 2021-02-03 RX ORDER — EPHEDRINE SULFATE 50 MG/ML
INJECTION INTRAVENOUS AS NEEDED
Status: DISCONTINUED | OUTPATIENT
Start: 2021-02-03 | End: 2021-02-03

## 2021-02-03 RX ORDER — METOCLOPRAMIDE HYDROCHLORIDE 5 MG/ML
10 INJECTION INTRAMUSCULAR; INTRAVENOUS ONCE AS NEEDED
Status: DISCONTINUED | OUTPATIENT
Start: 2021-02-03 | End: 2021-02-03 | Stop reason: HOSPADM

## 2021-02-03 RX ORDER — DEXAMETHASONE SODIUM PHOSPHATE 10 MG/ML
INJECTION, SOLUTION INTRAMUSCULAR; INTRAVENOUS AS NEEDED
Status: DISCONTINUED | OUTPATIENT
Start: 2021-02-03 | End: 2021-02-03

## 2021-02-03 RX ORDER — FENTANYL CITRATE 50 UG/ML
INJECTION, SOLUTION INTRAMUSCULAR; INTRAVENOUS AS NEEDED
Status: DISCONTINUED | OUTPATIENT
Start: 2021-02-03 | End: 2021-02-03

## 2021-02-03 RX ORDER — OXYCODONE HYDROCHLORIDE AND ACETAMINOPHEN 5; 325 MG/1; MG/1
2 TABLET ORAL EVERY 4 HOURS PRN
Status: DISCONTINUED | OUTPATIENT
Start: 2021-02-03 | End: 2021-02-03 | Stop reason: HOSPADM

## 2021-02-03 RX ORDER — OXYCODONE HYDROCHLORIDE AND ACETAMINOPHEN 5; 325 MG/1; MG/1
1 TABLET ORAL EVERY 4 HOURS PRN
Qty: 12 TABLET | Refills: 0 | Status: SHIPPED | OUTPATIENT
Start: 2021-02-03 | End: 2021-02-13

## 2021-02-03 RX ORDER — LIDOCAINE HYDROCHLORIDE 10 MG/ML
INJECTION, SOLUTION EPIDURAL; INFILTRATION; INTRACAUDAL; PERINEURAL AS NEEDED
Status: DISCONTINUED | OUTPATIENT
Start: 2021-02-03 | End: 2021-02-03

## 2021-02-03 RX ORDER — LIDOCAINE HYDROCHLORIDE 10 MG/ML
0.5 INJECTION, SOLUTION EPIDURAL; INFILTRATION; INTRACAUDAL; PERINEURAL ONCE AS NEEDED
Status: COMPLETED | OUTPATIENT
Start: 2021-02-03 | End: 2021-02-03

## 2021-02-03 RX ORDER — IBUPROFEN 400 MG/1
600 TABLET ORAL EVERY 6 HOURS PRN
Status: DISCONTINUED | OUTPATIENT
Start: 2021-02-03 | End: 2021-02-03 | Stop reason: HOSPADM

## 2021-02-03 RX ORDER — B-COMPLEX WITH VITAMIN C
2 TABLET ORAL 2 TIMES DAILY WITH MEALS
Qty: 84 TABLET | Refills: 0 | Status: SHIPPED | OUTPATIENT
Start: 2021-02-03 | End: 2022-05-19

## 2021-02-03 RX ORDER — LIDOCAINE HYDROCHLORIDE AND EPINEPHRINE 10; 10 MG/ML; UG/ML
INJECTION, SOLUTION INFILTRATION; PERINEURAL AS NEEDED
Status: DISCONTINUED | OUTPATIENT
Start: 2021-02-03 | End: 2021-02-03 | Stop reason: HOSPADM

## 2021-02-03 RX ORDER — CLOPIDOGREL BISULFATE 75 MG/1
75 TABLET ORAL DAILY
Qty: 30 TABLET | Refills: 0 | Status: SHIPPED | OUTPATIENT
Start: 2021-02-03 | End: 2021-02-24 | Stop reason: SDUPTHER

## 2021-02-03 RX ORDER — ONDANSETRON 2 MG/ML
4 INJECTION INTRAMUSCULAR; INTRAVENOUS ONCE AS NEEDED
Status: DISCONTINUED | OUTPATIENT
Start: 2021-02-03 | End: 2021-02-03 | Stop reason: HOSPADM

## 2021-02-03 RX ORDER — FENTANYL CITRATE/PF 50 MCG/ML
50 SYRINGE (ML) INJECTION
Status: DISCONTINUED | OUTPATIENT
Start: 2021-02-03 | End: 2021-02-03 | Stop reason: HOSPADM

## 2021-02-03 RX ORDER — LEVOTHYROXINE SODIUM 0.15 MG/1
150 TABLET ORAL DAILY
Qty: 30 TABLET | Refills: 3 | Status: SHIPPED | OUTPATIENT
Start: 2021-02-03 | End: 2021-06-11 | Stop reason: SDUPTHER

## 2021-02-03 RX ORDER — PROPOFOL 10 MG/ML
INJECTION, EMULSION INTRAVENOUS AS NEEDED
Status: DISCONTINUED | OUTPATIENT
Start: 2021-02-03 | End: 2021-02-03

## 2021-02-03 RX ORDER — MAGNESIUM HYDROXIDE 1200 MG/15ML
LIQUID ORAL AS NEEDED
Status: DISCONTINUED | OUTPATIENT
Start: 2021-02-03 | End: 2021-02-03 | Stop reason: HOSPADM

## 2021-02-03 RX ADMIN — FENTANYL CITRATE 50 MCG: 50 INJECTION INTRAMUSCULAR; INTRAVENOUS at 13:45

## 2021-02-03 RX ADMIN — Medication 100 MG: at 13:45

## 2021-02-03 RX ADMIN — LIDOCAINE HYDROCHLORIDE 0.2 ML: 10 INJECTION, SOLUTION EPIDURAL; INFILTRATION; INTRACAUDAL; PERINEURAL at 13:05

## 2021-02-03 RX ADMIN — EPHEDRINE SULFATE 5 MG: 50 INJECTION, SOLUTION INTRAVENOUS at 13:52

## 2021-02-03 RX ADMIN — FENTANYL CITRATE 50 MCG: 50 INJECTION INTRAMUSCULAR; INTRAVENOUS at 14:09

## 2021-02-03 RX ADMIN — Medication 50 MCG: at 16:13

## 2021-02-03 RX ADMIN — SODIUM CHLORIDE, SODIUM LACTATE, POTASSIUM CHLORIDE, AND CALCIUM CHLORIDE: .6; .31; .03; .02 INJECTION, SOLUTION INTRAVENOUS at 14:52

## 2021-02-03 RX ADMIN — SODIUM CHLORIDE, SODIUM LACTATE, POTASSIUM CHLORIDE, AND CALCIUM CHLORIDE 50 ML/HR: .6; .31; .03; .02 INJECTION, SOLUTION INTRAVENOUS at 13:05

## 2021-02-03 RX ADMIN — DEXAMETHASONE SODIUM PHOSPHATE 10 MG: 10 INJECTION, SOLUTION INTRAMUSCULAR; INTRAVENOUS at 14:07

## 2021-02-03 RX ADMIN — PROPOFOL 300 MG: 10 INJECTION, EMULSION INTRAVENOUS at 13:45

## 2021-02-03 RX ADMIN — LIDOCAINE HYDROCHLORIDE 50 MG: 10 INJECTION, SOLUTION EPIDURAL; INFILTRATION; INTRACAUDAL; PERINEURAL at 13:45

## 2021-02-03 RX ADMIN — PHENYLEPHRINE HYDROCHLORIDE 30 MCG/MIN: 10 INJECTION INTRAVENOUS at 14:00

## 2021-02-03 RX ADMIN — ONDANSETRON 4 MG: 2 INJECTION INTRAMUSCULAR; INTRAVENOUS at 15:45

## 2021-02-03 RX ADMIN — EPHEDRINE SULFATE 10 MG: 50 INJECTION, SOLUTION INTRAVENOUS at 13:55

## 2021-02-03 RX ADMIN — MIDAZOLAM 2 MG: 1 INJECTION INTRAMUSCULAR; INTRAVENOUS at 13:37

## 2021-02-03 NOTE — OP NOTE
OPERATIVE REPORT  PATIENT NAME: Patrice England    :  1965  MRN: [id number]  Pt Location: [facility] OR ROOM 05    SURGERY DATE: 2/3/2021    Surgeon(s) and Role:     * Huong Dick MD - Primary     * Santhosh Bernabe, DDS - Assisting    Preop Diagnosis:  Thyroid cancer ([facility] [address] ) [C73]    Post-Op Diagnosis Codes: * Thyroid cancer ([facility] [address] ) [C73]    Procedure(s) (LRB):  TOTAL THYROIDECTOMY WITH NIMS MONITORING (N/A)    Specimen(s):  ID Type Source Tests Collected by Time Destination   1 : total thyroidectomy stitch marks right upper poll Tissue Thyroid TISSUE EXAM Huong Dick MD 2/3/2021 1417    2 : right superior parathyroid Tissue Parathyroid TISSUE EXAM Huong Dick MD 2/3/2021 1517        Estimated Blood Loss:   30 mL    Drains:  * No LDAs found *    Anesthesia Type:   General    Operative Indications:  Thyroid cancer ([facility] [address] ) [C73]      Operative Findings:  Intact RLN bilaterally  R sup para reimplanted    Complications:   None    Procedure and Technique:  The patient was positively identified and transferred onto the operating table in the supine position  Appropriate monitoring devices were put in place, anesthesia was induced and the patient was intubated without difficulty  A shoulder roll was placed, and the patients neck was examined  An appropriate site for skin incision was demarcated 1cm below the cricoid cartilage  Before proceeding further, the timeout process was completed  Local anesthesia in the form of 1% lidocaine with 1/100,000 epinephrine was then injected to the marked site  The patients neck was then prepped and draped in the usual sterile fashion  Skin incision was then made at the marked site in a transverse fashion using a 15 blade  Dissection continued through subcutaneous tissues and platysma using the 15 blade and Bovie cautery  Superficial flaps were then elevated in the subplatysmal plane using Bovie cautery    Dissection then continued in midline in between strap musculature using DeBakey forceps and Bovie cautery  Strap muscles were then elevated off the underlying thyroid gland on the left side using Bovie cautery and blunt dissection  The strap muscles were then held in a retracted position using an [employer] White Pigeon retractor  Dissection then continued around the lobe of the thyroid gland, immediately adjacent to the capsule of the gland using harmonic and cautery  This dissection was carried out along the superior aspect of the isthmus, extending along the medial aspect of the upper pole  Care was taken to elevate a pyramidal lobe that was encountered, and it was reflected inferiorly to remain in continuity with the isthmus  The superior pole was retracted medially and inferiorly, and dissection continued around the lateral aspect of the superior lobe  The superior pedicle was divided using bipolar  This allowed further reflection and retraction of the gland medially, and dissection continued inferiorly, with care taken to remain as close as possible to the capsule of the gland to minimize risk of injury or sacrifice of parathyroid glands  With continued dissection, the recurrent laryngeal nerve was identified  Remaining tissue attachments at [facility] ligament were then divided using harmonic, with care taken to limit extent of dissection at the point of entry of the recurrent laryngeal nerve  Remaining attachments to the anterior trachea were divided using cautery  Attention was then turned to the right lobe  Strap muscles were elevated off the underlying thyroid gland on the right side using Bovie cautery and blunt dissection  The strap muscles were then held in a retracted position using an [employer] White Pigeon retractor  Dissection then continued around the lobe of the thyroid gland, immediately adjacent to the capsule of the gland using bipolar cautery    This dissection was carried out along the superior aspect of the isthmus, extending along the medial aspect of the upper pole, while retracting the left lobe and isthmus toward the left  The superior pole was retracted medially and inferiorly, and dissection continued around the lateral aspect of the superior lobe  The superior pedicle was divided using the bipolar  This allowed further reflection and retraction of the gland medially, and dissection continued inferiorly, with care taken to remain as close as possible to the capsule of the gland to minimize risk of injury or sacrifice of parathyroid glands  The R sup para was identified and devascularized  Frozen section confirmed parathyroid tissue  The remaining tissue was then minced and placed in a dry pocket within the right sternocleidomastoid  This was closed and marked with hemoclips  With continued dissection, the recurrent laryngeal nerve was identified  Remaining tissue attachments at [facility] ligament were then divided using harmonic, with care taken to limit extent of dissection at the point of entry of the recurrent laryngeal nerve  Remaining attachments to the anterior trachea were divided using harmonic cautery, and the entire thyroid gland was removed, and carefully examined  No adherent parathyroid glands were noted, and the gland was set aside to send to pathology for permanent section evaluation  The central compartment was then palpated for any metastatic disease and there was none  The bilateral recurrent laryngeal nerves were stimulated at 0 5mA resulting in palpable contraction of the bilateral PCA muscles  The surgical site was irrigated with saline, and hemostasis was ensured using bipolar cautery  Zoltan hemostatic agent was applied  The surgical site was then closed in multiple layers  Strap muscles were re-approximated across midline using 3-0 Vicryl stitches in a figure of eight fashion, and the same stitch was used in an interrupted fashion to reapproximate the plastysma and tissue in midline at the same plane    Skin was closed using a 4-0 Monocryl stitch in a running sub-cuticular fashion, followed by a Steristrip  Anesthesia was then reversed, and the patient was awakened, extubated and taken to the recovery room in stable condition  All counts were correct at the end of the case, and no complications were encountered            I was present for the entire procedure    Patient Disposition:  PACU     SIGNATURE: Joseph Lerma MD  DATE: February 3, 2021  TIME: 4:03 PM

## 2021-02-03 NOTE — CASE MANAGEMENT
Cm reached out to ID to inform her that the pt would like to dc home with services and a script may be needed  No

## 2021-02-03 NOTE — INTERVAL H&P NOTE
H&P reviewed  After examining the patient I find no changes in the patients condition since the H&P had been written      CTAB  RRR    Vitals:    02/03/21 1200   BP: (!) 154/102   Pulse: 80   Resp: 16   Temp: 97 8 °F (36 6 °C)   SpO2: 100%

## 2021-02-03 NOTE — DISCHARGE INSTRUCTIONS
Thyroidectomy   WHAT YOU NEED TO KNOW:   Thyroidectomy is surgery to remove your thyroid gland  Your thyroid is a gland in the front lower part of your neck  The thyroid makes hormones that regulate your metabolism, body temperature, and heart rate  Smaller glands called parathyroids regulate the level of calcium in your blood  You have 4 parathyroids, located on the sides of your thyroid gland  Your parathyroids will not be removed during this surgery  DISCHARGE INSTRUCTIONS:   Medicines: The following medicines have been ordered by your healthcare provider:  · Pain medicine  can help take away or decrease pain  Do not wait until the pain is severe before you take your medicine  Only use the oxycodone/acetaminophen for severe pain, otherwise use just acetaminophen (Tylenol)  Thyroid medicine  Has been prescribed to replace your thyroid hormone  Calcium supplements: It is important to take the calcium and  vitamin D as prescribed  · Take your medicine as directed  Contact your healthcare provider if you think your medicine is not helping or if you have side effects  Tell him or her if you are allergic to any medicine  Keep a list of the medicines, vitamins, and herbs you take  Include the amounts, and when and why you take them  Bring the list or the pill bottles to follow-up visits  Carry your medicine list with you in case of an emergency  Follow up with your endocrinologist or surgeon as directed: You will need to return to have your wound checked and stitches removed  You may also need blood tests to monitor your calcium, parathyroid, and thyroid hormone levels  Write down your questions so you remember to ask them during your visits  Self-care:   · Rest when you need to while you heal after surgery  Slowly start to do more each day  Return to your daily activities as directed  · Wound care:  Carefully wash your skin near the incision wound area with soap and water  Dry the area and put on new, clean bandages as directed  Change your bandages when they get wet or dirty  You can use a mild body lotion to improve the scar  · Swallowing and voice changes: You may have a sore throat, hoarse voice, or difficulty swallowing after surgery  These symptoms should go away after a few days  Contact your endocrinologist or surgeon if:   · You have a fever or chills  · You feel very tired and cold, gain weight for no reason, and your skin is very dry  · You vomit several times in a row  · Your incision is red, swollen, or draining pus  · You have new voice weakness or hoarseness, or it is getting worse  · You have questions or concerns about your condition or care  Seek care immediately or call 911 if:   · You have sudden tingling or muscle cramps in your face, arm, or leg  · You have muscle spasms in your legs and feet that do not go away  · Blood soaks through your bandage  · Your stitches come apart  · You have sudden swelling in your neck or difficulty swallowing  · You have trouble breathing  · You have a seizure  © 2017 [address]  Information is for End User's use only and may not be sold, redistributed or otherwise used for commercial purposes  All illustrations and images included in CareNotes® are the copyrighted property of [employer] , [employer]  or Richy Murillo  The above information is an  only  It is not intended as medical advice for individual conditions or treatments  Talk to your doctor, nurse or pharmacist before following any medical regimen to see if it is safe and effective for you  Call Dr Felton Pelletier with any questions or concerns: office ; mobile  (urgent issues)

## 2021-02-03 NOTE — ANESTHESIA POSTPROCEDURE EVALUATION
Post-Op Assessment Note    CV Status:  Stable  Pain Score: 3    Pain management: adequate     Mental Status:  Awake   Hydration Status:  Stable   PONV Controlled:  None   Airway Patency:  Patent      Post Op Vitals Reviewed: Yes      Staff: Anesthesiologist, CRNA         No complications documented      bp  168/102   Temp 98 2 °F (36 8 °C) (02/03/21 1607)    Pulse 82 (02/03/21 1607)   Resp   12   SpO2   99

## 2021-02-17 ENCOUNTER — TELEPHONE (OUTPATIENT)
Dept: FAMILY MEDICINE CLINIC | Facility: CLINIC | Age: 56
End: 2021-02-17

## 2021-02-17 NOTE — TELEPHONE ENCOUNTER
Pt called to let us know that [address] [place] [address] will be reaching out to us to request pt's records for his claim  Pt states he is an amputee and has hx of thyroid ca   Pt provided his claim #[id number]

## 2021-02-24 DIAGNOSIS — E05.90 HYPERTHYROIDISM: ICD-10-CM

## 2021-02-24 DIAGNOSIS — E78.5 HYPERLIPIDEMIA, UNSPECIFIED HYPERLIPIDEMIA TYPE: ICD-10-CM

## 2021-02-24 DIAGNOSIS — I73.9 PAD (PERIPHERAL ARTERY DISEASE) (HCC): ICD-10-CM

## 2021-02-24 RX ORDER — METHIMAZOLE 5 MG/1
5 TABLET ORAL DAILY
Qty: 30 TABLET | Refills: 0 | Status: SHIPPED | OUTPATIENT
Start: 2021-02-24 | End: 2021-03-25

## 2021-02-24 RX ORDER — ATORVASTATIN CALCIUM 40 MG/1
40 TABLET, FILM COATED ORAL
Qty: 30 TABLET | Refills: 3 | Status: SHIPPED | OUTPATIENT
Start: 2021-02-24 | End: 2021-06-11 | Stop reason: SDUPTHER

## 2021-02-24 RX ORDER — CLOPIDOGREL BISULFATE 75 MG/1
75 TABLET ORAL DAILY
Qty: 30 TABLET | Refills: 0 | Status: SHIPPED | OUTPATIENT
Start: 2021-02-24 | End: 2021-10-18 | Stop reason: SDUPTHER

## 2021-02-24 NOTE — TELEPHONE ENCOUNTER
Medication:  PDMP   02/03/2021  1  02/03/2021  OXYCODONE-ACETAMINOPHEN 5-325  12 0  2  DA BRO  [id number]  [facility]  0  45  0 MME  [place]    12/03/2020  1  12/03/2020  ZOLPIDEM TARTRATE 10 MG TABLET  30 0  30  BR MONIQUE  [id number]  [facility]  0   [place]    10/21/2020  1  08/20/2020  ZOLPIDEM TARTRATE 10 MG TABLET  30 0  30  BR MONIQUE  [id number]  [facility]  1   [place]    08/20/2020  1  08/20/2020  ZOLPIDEM TARTRATE 10 MG TABLET  30 0  30  BR MONIQUE  [id number]  [facility]  0   [place]        Active agreement on file -No

## 2021-03-10 DIAGNOSIS — Z23 ENCOUNTER FOR IMMUNIZATION: ICD-10-CM

## 2021-03-25 DIAGNOSIS — E05.90 HYPERTHYROIDISM: ICD-10-CM

## 2021-03-25 RX ORDER — METHIMAZOLE 5 MG/1
TABLET ORAL
Qty: 30 TABLET | Refills: 0 | Status: SHIPPED | OUTPATIENT
Start: 2021-03-25 | End: 2021-10-18

## 2021-03-25 NOTE — TELEPHONE ENCOUNTER
Requested medication(s) are due for refill today: Yes  Patient has already received a courtesy refill: No  Other reason request has been forwarded to provider: Refill cannot be delegated

## 2021-03-29 ENCOUNTER — IMMUNIZATIONS (OUTPATIENT)
Dept: FAMILY MEDICINE CLINIC | Facility: HOSPITAL | Age: 56
End: 2021-03-29

## 2021-03-29 ENCOUNTER — RA CDI HCC (OUTPATIENT)
Dept: OTHER | Facility: HOSPITAL | Age: 56
End: 2021-03-29

## 2021-03-29 DIAGNOSIS — Z23 ENCOUNTER FOR IMMUNIZATION: Primary | ICD-10-CM

## 2021-03-29 PROBLEM — Z89.431 ACQUIRED ABSENCE OF RIGHT FOOT (HCC): Status: ACTIVE | Noted: 2021-03-29

## 2021-03-29 PROCEDURE — 0001A SARS-COV-2 / COVID-19 MRNA VACCINE (PFIZER-BIONTECH) 30 MCG: CPT

## 2021-03-29 PROCEDURE — 91300 SARS-COV-2 / COVID-19 MRNA VACCINE (PFIZER-BIONTECH) 30 MCG: CPT

## 2021-03-29 NOTE — PROGRESS NOTES
[place] [address]  coding oppertunities             Chart reviewed, (number of) suggestions sent to provider: 3     Problem listed updated   Provider Accepted, (number of) suggestions accepted: 3              [place] [address]  coding oppertunities             Chart reviewed, (number of) suggestions sent to provider: 3  I73 9  Peripheral vascular disease, unspecified  G54 6  Phantom limb syndrome with pain  Z89 [id number]  Acquired absence of right foot

## 2021-04-21 ENCOUNTER — IMMUNIZATIONS (OUTPATIENT)
Dept: FAMILY MEDICINE CLINIC | Facility: HOSPITAL | Age: 56
End: 2021-04-21

## 2021-04-21 DIAGNOSIS — Z23 ENCOUNTER FOR IMMUNIZATION: Primary | ICD-10-CM

## 2021-04-21 PROCEDURE — 0002A SARS-COV-2 / COVID-19 MRNA VACCINE (PFIZER-BIONTECH) 30 MCG: CPT

## 2021-04-21 PROCEDURE — 91300 SARS-COV-2 / COVID-19 MRNA VACCINE (PFIZER-BIONTECH) 30 MCG: CPT

## 2021-06-11 DIAGNOSIS — E78.5 HYPERLIPIDEMIA, UNSPECIFIED HYPERLIPIDEMIA TYPE: ICD-10-CM

## 2021-06-11 DIAGNOSIS — C73 THYROID CANCER (HCC): ICD-10-CM

## 2021-06-11 RX ORDER — ATORVASTATIN CALCIUM 40 MG/1
40 TABLET, FILM COATED ORAL
Qty: 30 TABLET | Refills: 0 | Status: SHIPPED | OUTPATIENT
Start: 2021-06-11 | End: 2021-07-14

## 2021-06-11 RX ORDER — LEVOTHYROXINE SODIUM 0.15 MG/1
150 TABLET ORAL DAILY
Qty: 30 TABLET | Refills: 3 | Status: SHIPPED | OUTPATIENT
Start: 2021-06-11 | End: 2021-10-09

## 2021-06-22 ENCOUNTER — VBI (OUTPATIENT)
Dept: ADMINISTRATIVE | Facility: OTHER | Age: 56
End: 2021-06-22

## 2021-06-22 NOTE — TELEPHONE ENCOUNTER
06/22/21 11:09 AM     See documentation in the [employer] SmartForm       Demetrius Abdifatah, [place]

## 2021-07-06 ENCOUNTER — TELEPHONE (OUTPATIENT)
Dept: NEPHROLOGY | Facility: CLINIC | Age: 56
End: 2021-07-06

## 2021-07-14 DIAGNOSIS — E78.5 HYPERLIPIDEMIA, UNSPECIFIED HYPERLIPIDEMIA TYPE: ICD-10-CM

## 2021-07-14 RX ORDER — ATORVASTATIN CALCIUM 40 MG/1
TABLET, FILM COATED ORAL
Qty: 30 TABLET | Refills: 0 | Status: SHIPPED | OUTPATIENT
Start: 2021-07-14 | End: 2021-08-13

## 2021-09-10 DIAGNOSIS — E78.5 HYPERLIPIDEMIA, UNSPECIFIED HYPERLIPIDEMIA TYPE: ICD-10-CM

## 2021-09-11 RX ORDER — ATORVASTATIN CALCIUM 40 MG/1
TABLET, FILM COATED ORAL
Qty: 30 TABLET | Refills: 0 | Status: SHIPPED | OUTPATIENT
Start: 2021-09-11 | End: 2021-10-09

## 2021-10-08 DIAGNOSIS — I10 HYPERTENSION, ESSENTIAL: ICD-10-CM

## 2021-10-08 DIAGNOSIS — E11.42 TYPE 2 DIABETES MELLITUS WITH DIABETIC POLYNEUROPATHY, WITH LONG-TERM CURRENT USE OF INSULIN (HCC): ICD-10-CM

## 2021-10-08 DIAGNOSIS — Z79.4 TYPE 2 DIABETES MELLITUS WITH DIABETIC POLYNEUROPATHY, WITH LONG-TERM CURRENT USE OF INSULIN (HCC): ICD-10-CM

## 2021-10-08 DIAGNOSIS — K21.9 GASTROESOPHAGEAL REFLUX DISEASE WITHOUT ESOPHAGITIS: ICD-10-CM

## 2021-10-08 RX ORDER — OMEPRAZOLE 40 MG/1
40 CAPSULE, DELAYED RELEASE ORAL DAILY
Qty: 90 CAPSULE | Refills: 0 | Status: SHIPPED | OUTPATIENT
Start: 2021-10-08 | End: 2022-01-27

## 2021-10-08 RX ORDER — VERAPAMIL HYDROCHLORIDE 120 MG/1
120 CAPSULE, EXTENDED RELEASE ORAL
Qty: 30 CAPSULE | Refills: 3 | Status: SHIPPED | OUTPATIENT
Start: 2021-10-08 | End: 2021-10-11 | Stop reason: SDUPTHER

## 2021-10-10 RX ORDER — INSULIN GLULISINE 100 [IU]/ML
20 INJECTION, SOLUTION SUBCUTANEOUS
Qty: 60 ML | Refills: 0 | Status: SHIPPED | OUTPATIENT
Start: 2021-10-10 | End: 2021-10-18

## 2021-10-11 DIAGNOSIS — I10 HYPERTENSION, ESSENTIAL: ICD-10-CM

## 2021-10-11 RX ORDER — VERAPAMIL HYDROCHLORIDE 120 MG/1
120 CAPSULE, EXTENDED RELEASE ORAL
Qty: 90 CAPSULE | Refills: 0 | Status: SHIPPED | OUTPATIENT
Start: 2021-10-11 | End: 2021-10-12 | Stop reason: SDUPTHER

## 2021-10-12 ENCOUNTER — TELEPHONE (OUTPATIENT)
Dept: FAMILY MEDICINE CLINIC | Facility: CLINIC | Age: 56
End: 2021-10-12

## 2021-10-12 DIAGNOSIS — I10 HYPERTENSION, ESSENTIAL: ICD-10-CM

## 2021-10-12 RX ORDER — VERAPAMIL HYDROCHLORIDE 360 MG/1
360 CAPSULE, DELAYED RELEASE PELLETS ORAL
Qty: 90 CAPSULE | Refills: 3 | Status: SHIPPED | OUTPATIENT
Start: 2021-10-12

## 2021-10-18 ENCOUNTER — OFFICE VISIT (OUTPATIENT)
Dept: FAMILY MEDICINE CLINIC | Facility: CLINIC | Age: 56
End: 2021-10-18
Payer: COMMERCIAL

## 2021-10-18 VITALS
BODY MASS INDEX: 32.23 KG/M2 | DIASTOLIC BLOOD PRESSURE: 110 MMHG | OXYGEN SATURATION: 97 % | TEMPERATURE: 98.2 F | HEIGHT: 77 IN | HEART RATE: 82 BPM | WEIGHT: 273 LBS | SYSTOLIC BLOOD PRESSURE: 146 MMHG

## 2021-10-18 DIAGNOSIS — Z79.4 TYPE 2 DIABETES MELLITUS WITH DIABETIC POLYNEUROPATHY, WITH LONG-TERM CURRENT USE OF INSULIN (HCC): ICD-10-CM

## 2021-10-18 DIAGNOSIS — Z23 ENCOUNTER FOR IMMUNIZATION: ICD-10-CM

## 2021-10-18 DIAGNOSIS — Z12.11 COLON CANCER SCREENING: ICD-10-CM

## 2021-10-18 DIAGNOSIS — E03.9 ACQUIRED HYPOTHYROIDISM: Primary | ICD-10-CM

## 2021-10-18 DIAGNOSIS — D64.9 ANEMIA, UNSPECIFIED TYPE: ICD-10-CM

## 2021-10-18 DIAGNOSIS — Z12.5 PROSTATE CANCER SCREENING: ICD-10-CM

## 2021-10-18 DIAGNOSIS — Z89.431 ACQUIRED ABSENCE OF RIGHT FOOT (HCC): ICD-10-CM

## 2021-10-18 DIAGNOSIS — E78.5 HYPERLIPIDEMIA, UNSPECIFIED HYPERLIPIDEMIA TYPE: ICD-10-CM

## 2021-10-18 DIAGNOSIS — K21.9 GASTROESOPHAGEAL REFLUX DISEASE WITHOUT ESOPHAGITIS: ICD-10-CM

## 2021-10-18 DIAGNOSIS — C73 THYROID CANCER (HCC): ICD-10-CM

## 2021-10-18 DIAGNOSIS — I73.9 PAD (PERIPHERAL ARTERY DISEASE) (HCC): ICD-10-CM

## 2021-10-18 DIAGNOSIS — E11.42 TYPE 2 DIABETES MELLITUS WITH DIABETIC POLYNEUROPATHY, WITH LONG-TERM CURRENT USE OF INSULIN (HCC): ICD-10-CM

## 2021-10-18 DIAGNOSIS — Z00.00 HEALTH MAINTENANCE EXAMINATION: ICD-10-CM

## 2021-10-18 DIAGNOSIS — I10 HYPERTENSION, ESSENTIAL: ICD-10-CM

## 2021-10-18 PROBLEM — M86.9 OSTEOMYELITIS (HCC): Status: RESOLVED | Noted: 2020-08-28 | Resolved: 2021-10-18

## 2021-10-18 PROBLEM — E87.1 HYPONATREMIA: Status: RESOLVED | Noted: 2020-08-04 | Resolved: 2021-10-18

## 2021-10-18 PROBLEM — I96 GANGRENE OF TOE OF RIGHT FOOT (HCC): Status: RESOLVED | Noted: 2020-08-04 | Resolved: 2021-10-18

## 2021-10-18 PROBLEM — N17.0 ACUTE KIDNEY INJURY (AKI) WITH ACUTE TUBULAR NECROSIS (ATN) (HCC): Status: RESOLVED | Noted: 2020-09-06 | Resolved: 2021-10-18

## 2021-10-18 PROBLEM — A41.9 SEVERE SEPSIS (HCC): Status: RESOLVED | Noted: 2020-08-04 | Resolved: 2021-10-18

## 2021-10-18 PROBLEM — S98.111A AMPUTATION OF RIGHT GREAT TOE (HCC): Status: RESOLVED | Noted: 2020-08-20 | Resolved: 2021-10-18

## 2021-10-18 PROBLEM — R65.20 SEVERE SEPSIS (HCC): Status: RESOLVED | Noted: 2020-08-04 | Resolved: 2021-10-18

## 2021-10-18 PROBLEM — Z01.818 PREOPERATIVE EXAMINATION: Status: RESOLVED | Noted: 2020-12-03 | Resolved: 2021-10-18

## 2021-10-18 PROBLEM — I95.9 HYPOTENSION: Status: RESOLVED | Noted: 2020-10-08 | Resolved: 2021-10-18

## 2021-10-18 PROBLEM — E05.90 HYPERTHYROIDISM: Status: RESOLVED | Noted: 2020-07-06 | Resolved: 2021-10-18

## 2021-10-18 PROCEDURE — 90682 RIV4 VACC RECOMBINANT DNA IM: CPT

## 2021-10-18 PROCEDURE — 99396 PREV VISIT EST AGE 40-64: CPT | Performed by: FAMILY MEDICINE

## 2021-10-18 PROCEDURE — 3008F BODY MASS INDEX DOCD: CPT | Performed by: FAMILY MEDICINE

## 2021-10-18 PROCEDURE — 90471 IMMUNIZATION ADMIN: CPT

## 2021-10-18 PROCEDURE — 3725F SCREEN DEPRESSION PERFORMED: CPT | Performed by: FAMILY MEDICINE

## 2021-10-18 PROCEDURE — 1036F TOBACCO NON-USER: CPT | Performed by: FAMILY MEDICINE

## 2021-10-18 PROCEDURE — 3077F SYST BP >= 140 MM HG: CPT | Performed by: FAMILY MEDICINE

## 2021-10-18 PROCEDURE — 3080F DIAST BP >= 90 MM HG: CPT | Performed by: FAMILY MEDICINE

## 2021-10-18 RX ORDER — CLOPIDOGREL BISULFATE 75 MG/1
75 TABLET ORAL DAILY
Qty: 90 TABLET | Refills: 3 | Status: SHIPPED | OUTPATIENT
Start: 2021-10-18 | End: 2022-01-10

## 2021-10-18 RX ORDER — LEVOTHYROXINE SODIUM 0.15 MG/1
150 TABLET ORAL DAILY
Qty: 90 TABLET | Refills: 3 | Status: SHIPPED | OUTPATIENT
Start: 2021-10-18

## 2021-10-18 RX ORDER — CLOPIDOGREL BISULFATE 75 MG/1
75 TABLET ORAL DAILY
Qty: 30 TABLET | Refills: 2 | Status: CANCELLED | OUTPATIENT
Start: 2021-10-18 | End: 2021-11-17

## 2021-10-18 RX ORDER — INSULIN GLULISINE 100 [IU]/ML
INJECTION, SOLUTION SUBCUTANEOUS
Qty: 60 ML | Refills: 0
Start: 2021-10-18

## 2021-10-18 RX ORDER — INSULIN GLARGINE 100 [IU]/ML
INJECTION, SOLUTION SUBCUTANEOUS
Qty: 10 ML | Refills: 0
Start: 2021-10-18 | End: 2021-12-21 | Stop reason: SDUPTHER

## 2021-10-18 RX ORDER — ATORVASTATIN CALCIUM 40 MG/1
40 TABLET, FILM COATED ORAL
Qty: 90 TABLET | Refills: 3 | Status: SHIPPED | OUTPATIENT
Start: 2021-10-18 | End: 2022-01-10

## 2021-10-19 ENCOUNTER — TELEPHONE (OUTPATIENT)
Dept: ADMINISTRATIVE | Facility: OTHER | Age: 56
End: 2021-10-19

## 2021-10-22 ENCOUNTER — IMMUNIZATIONS (OUTPATIENT)
Dept: FAMILY MEDICINE CLINIC | Facility: HOSPITAL | Age: 56
End: 2021-10-22

## 2021-10-22 DIAGNOSIS — Z23 ENCOUNTER FOR IMMUNIZATION: Primary | ICD-10-CM

## 2021-10-22 PROCEDURE — 0001A COVID-19 PFIZER VACC 0.3 ML: CPT

## 2021-10-22 PROCEDURE — 91300 COVID-19 PFIZER VACC 0.3 ML: CPT

## 2021-12-15 ENCOUNTER — OFFICE VISIT (OUTPATIENT)
Dept: GASTROENTEROLOGY | Facility: CLINIC | Age: 56
End: 2021-12-15
Payer: COMMERCIAL

## 2021-12-15 ENCOUNTER — TELEPHONE (OUTPATIENT)
Dept: FAMILY MEDICINE CLINIC | Facility: CLINIC | Age: 56
End: 2021-12-15

## 2021-12-15 VITALS
SYSTOLIC BLOOD PRESSURE: 158 MMHG | WEIGHT: 278 LBS | HEIGHT: 77 IN | BODY MASS INDEX: 32.82 KG/M2 | DIASTOLIC BLOOD PRESSURE: 108 MMHG

## 2021-12-15 DIAGNOSIS — Z12.11 COLON CANCER SCREENING: ICD-10-CM

## 2021-12-15 DIAGNOSIS — K59.04 CHRONIC IDIOPATHIC CONSTIPATION: ICD-10-CM

## 2021-12-15 DIAGNOSIS — Z86.010 HISTORY OF COLON POLYPS: Primary | ICD-10-CM

## 2021-12-15 PROCEDURE — 99203 OFFICE O/P NEW LOW 30 MIN: CPT | Performed by: PHYSICIAN ASSISTANT

## 2021-12-21 ENCOUNTER — OFFICE VISIT (OUTPATIENT)
Dept: FAMILY MEDICINE CLINIC | Facility: CLINIC | Age: 56
End: 2021-12-21
Payer: COMMERCIAL

## 2021-12-21 VITALS
SYSTOLIC BLOOD PRESSURE: 154 MMHG | HEIGHT: 77 IN | OXYGEN SATURATION: 97 % | TEMPERATURE: 97.9 F | BODY MASS INDEX: 32.71 KG/M2 | DIASTOLIC BLOOD PRESSURE: 102 MMHG | HEART RATE: 83 BPM | WEIGHT: 277 LBS

## 2021-12-21 DIAGNOSIS — E78.5 HYPERLIPIDEMIA, UNSPECIFIED HYPERLIPIDEMIA TYPE: ICD-10-CM

## 2021-12-21 DIAGNOSIS — Z79.4 TYPE 2 DIABETES MELLITUS WITH DIABETIC POLYNEUROPATHY, WITH LONG-TERM CURRENT USE OF INSULIN (HCC): ICD-10-CM

## 2021-12-21 DIAGNOSIS — I10 HYPERTENSION, ESSENTIAL: Primary | ICD-10-CM

## 2021-12-21 DIAGNOSIS — K21.9 GASTROESOPHAGEAL REFLUX DISEASE WITHOUT ESOPHAGITIS: ICD-10-CM

## 2021-12-21 DIAGNOSIS — E11.42 TYPE 2 DIABETES MELLITUS WITH DIABETIC POLYNEUROPATHY, WITH LONG-TERM CURRENT USE OF INSULIN (HCC): ICD-10-CM

## 2021-12-21 DIAGNOSIS — Z89.431 ACQUIRED ABSENCE OF RIGHT FOOT (HCC): ICD-10-CM

## 2021-12-21 DIAGNOSIS — R53.81 PHYSICAL DECONDITIONING: ICD-10-CM

## 2021-12-21 DIAGNOSIS — E03.9 ACQUIRED HYPOTHYROIDISM: ICD-10-CM

## 2021-12-21 PROCEDURE — 1036F TOBACCO NON-USER: CPT | Performed by: FAMILY MEDICINE

## 2021-12-21 PROCEDURE — 3077F SYST BP >= 140 MM HG: CPT | Performed by: FAMILY MEDICINE

## 2021-12-21 PROCEDURE — 3080F DIAST BP >= 90 MM HG: CPT | Performed by: FAMILY MEDICINE

## 2021-12-21 PROCEDURE — 3008F BODY MASS INDEX DOCD: CPT | Performed by: FAMILY MEDICINE

## 2021-12-21 PROCEDURE — 99214 OFFICE O/P EST MOD 30 MIN: CPT | Performed by: FAMILY MEDICINE

## 2021-12-21 RX ORDER — INSULIN GLARGINE 100 [IU]/ML
INJECTION, SOLUTION SUBCUTANEOUS
Qty: 10 ML | Refills: 0
Start: 2021-12-21 | End: 2022-01-31 | Stop reason: SDUPTHER

## 2021-12-21 RX ORDER — IRBESARTAN AND HYDROCHLOROTHIAZIDE 150; 12.5 MG/1; MG/1
1 TABLET, FILM COATED ORAL DAILY
Qty: 90 TABLET | Refills: 3 | Status: SHIPPED | OUTPATIENT
Start: 2021-12-21 | End: 2022-02-25 | Stop reason: SDUPTHER

## 2021-12-28 ENCOUNTER — TELEPHONE (OUTPATIENT)
Dept: ADMINISTRATIVE | Facility: OTHER | Age: 56
End: 2021-12-28

## 2021-12-28 NOTE — LETTER
Diabetic Eye Exam Form    Date Requested: 22  Patient: Nadege Gray  Patient : 1965   Referring Provider: Mary Go MD    Dilated Retinal Exam, Optomap-Iris Exam, or Fundus Photography Done         Yes ([place] above)         No     Date of Diabetic Eye Exam ______________________________  Left Eye      Exam did show retinopathy    Exam did not show retinopathy         Mild       Moderate       None       Proliferative       Severe     Right Eye     Exam did show retinopathy    Exam did not show retinopathy         Mild       Moderate       None       Proliferative       Severe     Comments __________________________________________________________    Practice Providing Exam ______________________________________________    Exam Performed By (print name) _______________________________________      Provider Signature ___________________________________________________      These reports are needed for  compliance  Please fax this completed form and a copy of the Diabetic Eye Exam report to our office located at [place] [address] as soon as possible via [phone] attention Linda Valenzuela: Phone [phone]    We thank you for your assistance in treating our mutual patient

## 2021-12-28 NOTE — TELEPHONE ENCOUNTER
----- Message from Donny Sanchez MA sent at 12/21/2021  1:29 PM EST -----  Regarding: dm eye  12/21/21 1:29 PM    Hello, our patient Klever Schaefer has had Diabetic Eye Exam completed/performed  Please assist in updating the patient chart by making an External outreach to [facility] eye facility located in [facility]  The date of service is 2021      Thank you,  ANTONIO Macias PG [address]

## 2022-01-07 ENCOUNTER — TELEPHONE (OUTPATIENT)
Dept: SURGERY | Facility: HOSPITAL | Age: 57
End: 2022-01-07

## 2022-01-10 ENCOUNTER — ANESTHESIA (OUTPATIENT)
Dept: GASTROENTEROLOGY | Facility: HOSPITAL | Age: 57
End: 2022-01-10

## 2022-01-10 ENCOUNTER — ANESTHESIA EVENT (OUTPATIENT)
Dept: GASTROENTEROLOGY | Facility: HOSPITAL | Age: 57
End: 2022-01-10

## 2022-01-10 ENCOUNTER — HOSPITAL ENCOUNTER (OUTPATIENT)
Dept: GASTROENTEROLOGY | Facility: HOSPITAL | Age: 57
Setting detail: OUTPATIENT SURGERY
Discharge: HOME | End: 2022-01-10
Admitting: INTERNAL MEDICINE
Payer: COMMERCIAL

## 2022-01-10 VITALS
DIASTOLIC BLOOD PRESSURE: 80 MMHG | HEART RATE: 74 BPM | OXYGEN SATURATION: 99 % | SYSTOLIC BLOOD PRESSURE: 132 MMHG | TEMPERATURE: 97.6 F | BODY MASS INDEX: 32.86 KG/M2 | RESPIRATION RATE: 16 BRPM | HEIGHT: 76 IN | WEIGHT: 269.84 LBS

## 2022-01-10 DIAGNOSIS — K59.04 CHRONIC IDIOPATHIC CONSTIPATION: ICD-10-CM

## 2022-01-10 DIAGNOSIS — Z12.11 COLON CANCER SCREENING: ICD-10-CM

## 2022-01-10 DIAGNOSIS — Z86.010 HISTORY OF COLON POLYPS: ICD-10-CM

## 2022-01-10 LAB
GLUCOSE SERPL-MCNC: 355 MG/DL (ref 65–140)
GLUCOSE SERPL-MCNC: 363 MG/DL (ref 65–140)

## 2022-01-10 PROCEDURE — 88305 TISSUE EXAM BY PATHOLOGIST: CPT | Performed by: PATHOLOGY

## 2022-01-10 PROCEDURE — 82948 REAGENT STRIP/BLOOD GLUCOSE: CPT

## 2022-01-10 PROCEDURE — 45385 COLONOSCOPY W/LESION REMOVAL: CPT | Performed by: INTERNAL MEDICINE

## 2022-01-10 RX ORDER — PROPOFOL 10 MG/ML
INJECTION, EMULSION INTRAVENOUS AS NEEDED
Status: DISCONTINUED | OUTPATIENT
Start: 2022-01-10 | End: 2022-01-10

## 2022-01-10 RX ORDER — SODIUM CHLORIDE, SODIUM LACTATE, POTASSIUM CHLORIDE, CALCIUM CHLORIDE 600; 310; 30; 20 MG/100ML; MG/100ML; MG/100ML; MG/100ML
125 INJECTION, SOLUTION INTRAVENOUS CONTINUOUS
Status: DISCONTINUED | OUTPATIENT
Start: 2022-01-10 | End: 2022-01-14 | Stop reason: HOSPADM

## 2022-01-10 RX ADMIN — PROPOFOL 30 MG: 10 INJECTION, EMULSION INTRAVENOUS at 08:18

## 2022-01-10 RX ADMIN — PROPOFOL 50 MG: 10 INJECTION, EMULSION INTRAVENOUS at 08:09

## 2022-01-10 RX ADMIN — PROPOFOL 50 MG: 10 INJECTION, EMULSION INTRAVENOUS at 08:21

## 2022-01-10 RX ADMIN — PROPOFOL 50 MG: 10 INJECTION, EMULSION INTRAVENOUS at 08:25

## 2022-01-10 RX ADMIN — PROPOFOL 150 MG: 10 INJECTION, EMULSION INTRAVENOUS at 08:07

## 2022-01-10 RX ADMIN — PROPOFOL 50 MG: 10 INJECTION, EMULSION INTRAVENOUS at 08:13

## 2022-01-10 RX ADMIN — SODIUM CHLORIDE, SODIUM LACTATE, POTASSIUM CHLORIDE, AND CALCIUM CHLORIDE 125 ML/HR: .6; .31; .03; .02 INJECTION, SOLUTION INTRAVENOUS at 07:31

## 2022-01-10 NOTE — ANESTHESIA PREPROCEDURE EVALUATION
Procedure:  COLONOSCOPY    Relevant Problems   CARDIO   (+) Hyperlipidemia   (+) Hypertension, essential      ENDO   (+) Acquired hypothyroidism   (+) Type 2 diabetes mellitus with diabetic polyneuropathy, with long-term current use of insulin (HCC)      GI/HEPATIC   (+) Gastroesophageal reflux disease without esophagitis      HEMATOLOGY   (+) Anemia      NEURO/PSYCH   (+) History of nonadherence to medical treatment      1  Colon cancer screening  2  History of colon polyps  3  Chronic idiopathic constipation  Last colonoscopy in 2014 with a hyperplastic polyp removed  Will update screening at this time     He has constipation s/p thyroidectomy earlier this year  Advised Miralax 1 capful daily     ______________________________________________________________________     HPI:  40-year-old male presents for evaluation prior to scheduling a screening colonoscopy  Last colonoscopy was in 2014 with a hyperplastic polyp removed  He reports some issues with constipation  He reports that this is new since his thyroidectomy earlier this year  He states that he will go 3-4 days with his bowel movements  At that point he will take either MiraLax or Metamucil which will cause multiple loose stools the next day  He is understandably not happy with this  He denies any significant abdominal pain  He has no rectal bleeding         Physical Exam    Airway    Mallampati score: III  TM Distance: >3 FB  Neck ROM: full     Dental       Cardiovascular  Cardiovascular exam normal    Pulmonary  Pulmonary exam normal     Other Findings         Digestive     Gastroesophageal reflux disease without esophagitis       Continue Prilosec 40 mg daily                Endocrine     Type 2 diabetes mellitus with diabetic polyneuropathy, with long-term current use of insulin (HCC)       Increase Lantus to 75 units daily  Continue Apidra 30 units t i d          Lab Results   Component Value Date     HGBA1C 6 4 (H) 12/10/2020               Relevant Medications     insulin glargine (LANTUS) 100 units/mL subcutaneous injection     Other Relevant Orders     C-peptide     Acquired hypothyroidism       Continue levothyroxine 150 mcg daily                Cardiovascular and Mediastinum     Hypertension, essential - Primary       Continue verapamil 360 mg daily  Add Avalide 150-12 5 mg daily           Relevant Medications     irbesartan-hydrochlorothiazide (AVALIDE) 150-12 5 MG per tablet          Other     Hyperlipidemia       Continue Lipitor 40 mg daily           Acquired absence of right foot ([address] )     Relevant Orders     Ambulatory referral to Physical Therapy     Physical deconditioning     Relevant Orders     Ambulatory referral to Physical Therapy         Anesthesia Plan  ASA Score- 3     Anesthesia Type- IV sedation with anesthesia with ASA Monitors  Additional Monitors:   Airway Plan:           Plan Factors-    Chart reviewed  EKG reviewed  Existing labs reviewed  Patient summary reviewed  Patient is not a current smoker  Induction- intravenous  Postoperative Plan-     Informed Consent- Anesthetic plan and risks discussed with patient  I personally reviewed this patient with the CRNA  Discussed and agreed on the Anesthesia Plan with the CRNA  Chetna Holliday

## 2022-01-10 NOTE — ANESTHESIA POSTPROCEDURE EVALUATION
Post-Op Assessment Note    CV Status:  Stable  Pain Score: 0    Pain management: adequate     Mental Status:  Alert and awake   Hydration Status:  Euvolemic   PONV Controlled:  Controlled   Airway Patency:  Patent      Post Op Vitals Reviewed: Yes      Staff: Anesthesiologist, CRNA         No complications documented      BP 97/58 (01/10/22 0830)    Temp      Pulse 72 (01/10/22 0830)   Resp 14 (01/10/22 0830)    SpO2 100 % (01/10/22 0830)

## 2022-01-10 NOTE — H&P
History and Physical - [facility]  Radha Peck 60 y o  male MRN: [id number]      HPI: Radha Peck is a 60y o  year old male who presents for a history of colon polyps      REVIEW OF SYSTEMS: Per the HPI, and otherwise unremarkable  Historical Information   Past Medical History:   Diagnosis Date    Chronic kidney disease     Colon polyp     Diabetes mellitus ([facility] [address] )     Hyperlipidemia     Hypertension     Thyroid cancer [facility])      Past Surgical History:   Procedure Laterality Date    COLONOSCOPY      FOOT SURGERY Right 2014    IR AORTAGRAM WITH RUN-OFF  8/6/2020    IR TUNNELED CENTRAL LINE PLACEMENT  9/8/2020    IR TUNNELED CENTRAL LINE REMOVAL  9/28/2020    MT AMPUTATION FOOT,TRANSMETATARSAL Right 9/3/2020    Procedure: AMPUTATION TRANSMETATARSAL (TMA);  Surgeon: Andressa Orozco DPM;  Location: [facility];  Service: Podiatry    MT THYROIDECTOMY N/A 2/3/2021    Procedure: TOTAL THYROIDECTOMY WITH NIMS MONITORING;  Surgeon: Luke Caballero MD;  Location: [facility];  Service: ENT    TOE AMPUTATION Right 8/11/2020    Procedure: AMPUTATION TOE;  Surgeon: Andressa Orozco DPM;  Location: [facility];  Service: [address] THYROID BIOPSY  7/2/2020     Social History   Social History     Substance and Sexual Activity   Alcohol Use Not Currently    Alcohol/week: 8 0 standard drinks    Types: 8 Cans of beer per week    Comment: last drink was 10 days ago     Social History     Substance and Sexual Activity   Drug Use Yes    Types: Marijuana    Comment: occassionally     Social History     Tobacco Use   Smoking Status Never Smoker   Smokeless Tobacco Never Used     Family History   Problem Relation Age of Onset    Cancer Mother     Hypertension Father        Meds/Allergies     (Not in a hospital admission)      No Known Allergies    Objective     Blood pressure 150/92, pulse 85, temperature 97 8 °F (36 6 °C), temperature source Temporal, resp   rate (!) 24, height 6' 4" (1 93 m), weight 122 kg (269 lb 13 5 oz), SpO2 99 %  PHYSICAL EXAM    Gen: NAD  CV: RRR  CHEST: Clear  ABD: soft, NT/ND  EXT: no edema      ASSESSMENT/PLAN:  This is a 60y o  year old male here for colonoscopy, and he is stable and optimized for his procedure

## 2022-01-25 NOTE — TELEPHONE ENCOUNTER
Upon review of the In Basket request and the patient's chart, initial outreach has been made via fax, please see Contacts section for details        [phone]         Thank you  Esperanza Washington MA

## 2022-01-27 DIAGNOSIS — E11.42 TYPE 2 DIABETES MELLITUS WITH DIABETIC POLYNEUROPATHY, WITH LONG-TERM CURRENT USE OF INSULIN (HCC): ICD-10-CM

## 2022-01-27 DIAGNOSIS — Z79.4 TYPE 2 DIABETES MELLITUS WITH DIABETIC POLYNEUROPATHY, WITH LONG-TERM CURRENT USE OF INSULIN (HCC): ICD-10-CM

## 2022-01-27 DIAGNOSIS — K21.9 GASTROESOPHAGEAL REFLUX DISEASE WITHOUT ESOPHAGITIS: ICD-10-CM

## 2022-01-27 RX ORDER — INSULIN GLULISINE 100 [IU]/ML
INJECTION, SOLUTION SUBCUTANEOUS
Qty: 60 ML | Refills: 3 | Status: SHIPPED | OUTPATIENT
Start: 2022-01-27 | End: 2022-05-21 | Stop reason: SDUPTHER

## 2022-01-27 RX ORDER — OMEPRAZOLE 40 MG/1
CAPSULE, DELAYED RELEASE ORAL
Qty: 90 CAPSULE | Refills: 3 | Status: SHIPPED | OUTPATIENT
Start: 2022-01-27

## 2022-01-31 DIAGNOSIS — Z79.4 TYPE 2 DIABETES MELLITUS WITH DIABETIC POLYNEUROPATHY, WITH LONG-TERM CURRENT USE OF INSULIN (HCC): ICD-10-CM

## 2022-01-31 DIAGNOSIS — E11.42 TYPE 2 DIABETES MELLITUS WITH DIABETIC POLYNEUROPATHY, WITH LONG-TERM CURRENT USE OF INSULIN (HCC): ICD-10-CM

## 2022-01-31 RX ORDER — INSULIN GLARGINE 100 [IU]/ML
INJECTION, SOLUTION SUBCUTANEOUS
Qty: 10 ML | Refills: 0
Start: 2022-01-31 | End: 2022-05-19 | Stop reason: SDUPTHER

## 2022-01-31 NOTE — TELEPHONE ENCOUNTER
Received a fax from [phone] for a refill for Methimazole  I see that this was discontinued on 3/25/2021  Please advise

## 2022-02-04 NOTE — TELEPHONE ENCOUNTER
Upon review of the In Basket request we Called office for updated document and Wander Bingham informed me that patients was there 2020 and 2021 but deferred dilated eye exam    Any additional questions or concerns should be emailed to the Practice Liaisons via [email or web address] email, please do not reply via In Basket      Thank you  Chrystal Vargas MA

## 2022-02-07 ENCOUNTER — RA CDI HCC (OUTPATIENT)
Dept: OTHER | Facility: HOSPITAL | Age: 57
End: 2022-02-07

## 2022-02-07 PROBLEM — E11.51 TYPE 2 DIABETES MELLITUS WITH DIABETIC PERIPHERAL ANGIOPATHY WITHOUT GANGRENE (HCC): Status: ACTIVE | Noted: 2022-02-07

## 2022-02-07 NOTE — PROGRESS NOTES
[facility] [address]  coding opportunities          Number of diagnosis code(s) already on the problem list added to FYI fla     Chart Reviewed * (Number of) Inbasket suggestions sent to Provider: 1     Problem listed updated  Provider Accepted, (number of) suggestions accepted: 1               Patients insurance company: [employer] (Medicare and Commercial for [facility])     Visit status: Patient ""no showed"" to the scheduled appointment        [facility] [address]  coding opportunities          Number of diagnosis code(s) already on the problem list added to FYI fla     Chart Reviewed * (Number of) Inbasket suggestions sent to Provider: 1     Problem listed updated  Provider Accepted, (number of) suggestions accepted: 1               Patients insurance company: [employer] (Medicare and Commercial for [employer] and [facility])             [facility]  coding opportunities          Number of diagnosis code(s) already on the problem list added to FYI fla     Chart Reviewed * (Number of) Inbasket suggestions sent to Provider: 1     Problem listed updated   Provider Accepted, (number of) suggestions accepted: 1               Patients insurance company: [employer] (Medicare and Commercial for [employer] and [facility])           [facility] [address]  coding opportunities          Number of diagnosis code(s) already on the problem list added to FYI fla   E11 42 Z79 4 Type 2 diabetes mellitus with diabetic polyneuropathy, with long-term current use of insulin ([facility])     I73 9 PAD (peripheral artery disease) ([address] )     G54 6 Phantom 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 ([facility] [address] )     Z89 431 Acquired absence of right foot ([facility] [address] )     Chart Reviewed * (Number of) Inbasket suggestions sent to Provider: 1      E11 51 Type 2 diabetes mellitus with diabetic peripheral angiopathy without gangrene   *PVD with DM2,assumed linkage as per ICD10 guidelines     If this is correct, please document and assess at your next visit,2022            Patients insurance company: [employer] (Medicare and [employer] and [employer])

## 2022-02-08 ENCOUNTER — TELEPHONE (OUTPATIENT)
Dept: FAMILY MEDICINE CLINIC | Facility: CLINIC | Age: 57
End: 2022-02-08

## 2022-02-08 NOTE — TELEPHONE ENCOUNTER
[facility] sent fax over requesting a 90 day supply with 3 refills for Methimazole 5 mg tabs for the patient  I see that this was discontinued on 10/18/21  Please advise

## 2022-02-14 DIAGNOSIS — E11.42 TYPE 2 DIABETES MELLITUS WITH DIABETIC POLYNEUROPATHY, WITH LONG-TERM CURRENT USE OF INSULIN (HCC): ICD-10-CM

## 2022-02-14 DIAGNOSIS — Z79.4 TYPE 2 DIABETES MELLITUS WITH DIABETIC POLYNEUROPATHY, WITH LONG-TERM CURRENT USE OF INSULIN (HCC): ICD-10-CM

## 2022-02-15 RX ORDER — INSULIN GLULISINE 100 [IU]/ML
20 INJECTION, SOLUTION SUBCUTANEOUS
Qty: 60 ML | Refills: 3 | OUTPATIENT
Start: 2022-02-15

## 2022-02-25 DIAGNOSIS — I10 HYPERTENSION, ESSENTIAL: ICD-10-CM

## 2022-02-28 RX ORDER — IRBESARTAN AND HYDROCHLOROTHIAZIDE 150; 12.5 MG/1; MG/1
1 TABLET, FILM COATED ORAL DAILY
Qty: 90 TABLET | Refills: 3 | Status: SHIPPED | OUTPATIENT
Start: 2022-02-28

## 2022-03-02 NOTE — TELEPHONE ENCOUNTER
Pt called back and notified Render In Strict Bullet Format?: No Detail Level: Zone Initiate Treatment: Doxycycline 50mg BID until stable, then 50mg QD. Onexton gel QHS Continue Regimen: Differin gel QHS

## 2022-05-18 DIAGNOSIS — Z79.4 TYPE 2 DIABETES MELLITUS WITH DIABETIC POLYNEUROPATHY, WITH LONG-TERM CURRENT USE OF INSULIN (HCC): ICD-10-CM

## 2022-05-18 DIAGNOSIS — E11.42 TYPE 2 DIABETES MELLITUS WITH DIABETIC POLYNEUROPATHY, WITH LONG-TERM CURRENT USE OF INSULIN (HCC): ICD-10-CM

## 2022-05-18 RX ORDER — INSULIN GLARGINE 100 [IU]/ML
INJECTION, SOLUTION SUBCUTANEOUS
Qty: 10 ML | Refills: 0
Start: 2022-05-18

## 2022-05-19 DIAGNOSIS — Z79.4 TYPE 2 DIABETES MELLITUS WITH DIABETIC POLYNEUROPATHY, WITH LONG-TERM CURRENT USE OF INSULIN (HCC): ICD-10-CM

## 2022-05-19 DIAGNOSIS — E11.42 TYPE 2 DIABETES MELLITUS WITH DIABETIC POLYNEUROPATHY, WITH LONG-TERM CURRENT USE OF INSULIN (HCC): ICD-10-CM

## 2022-05-19 RX ORDER — INSULIN GLARGINE 100 [IU]/ML
INJECTION, SOLUTION SUBCUTANEOUS
Qty: 10 ML | Refills: 0 | Status: SHIPPED | OUTPATIENT
Start: 2022-05-19 | End: 2022-05-23

## 2022-05-19 NOTE — TELEPHONE ENCOUNTER
Dr Javi Colmenares, pt has scheduled for your next opening (on 6/7)  Is wondering if a refill of this med can be sent in to get him through to his appt  Reports he has not had any in a month and he is having side effects of not having it

## 2022-05-21 DIAGNOSIS — E11.42 TYPE 2 DIABETES MELLITUS WITH DIABETIC POLYNEUROPATHY, WITH LONG-TERM CURRENT USE OF INSULIN (HCC): ICD-10-CM

## 2022-05-21 DIAGNOSIS — Z79.4 TYPE 2 DIABETES MELLITUS WITH DIABETIC POLYNEUROPATHY, WITH LONG-TERM CURRENT USE OF INSULIN (HCC): ICD-10-CM

## 2022-05-21 RX ORDER — INSULIN GLULISINE 100 [IU]/ML
INJECTION, SOLUTION SUBCUTANEOUS
Qty: 60 ML | Refills: 0 | Status: SHIPPED | OUTPATIENT
Start: 2022-05-21

## 2022-05-23 ENCOUNTER — TELEPHONE (OUTPATIENT)
Dept: FAMILY MEDICINE CLINIC | Facility: CLINIC | Age: 57
End: 2022-05-23

## 2022-05-23 DIAGNOSIS — E11.42 TYPE 2 DIABETES MELLITUS WITH DIABETIC POLYNEUROPATHY, WITH LONG-TERM CURRENT USE OF INSULIN (HCC): Primary | ICD-10-CM

## 2022-05-23 DIAGNOSIS — Z79.4 TYPE 2 DIABETES MELLITUS WITH DIABETIC POLYNEUROPATHY, WITH LONG-TERM CURRENT USE OF INSULIN (HCC): Primary | ICD-10-CM

## 2022-05-23 RX ORDER — INSULIN GLARGINE 100 [IU]/ML
INJECTION, SOLUTION SUBCUTANEOUS
Qty: 15 ML | Refills: 3 | Status: SHIPPED | OUTPATIENT
Start: 2022-05-23 | End: 2022-06-07 | Stop reason: SDUPTHER

## 2022-05-23 NOTE — TELEPHONE ENCOUNTER
T/c from pt -- is upset because his lantus medication keeps getting sent in incorrectly  Lantus "in a vial" was sent in earlier last week and [facility] sent in apidra solostar for him over the weekend when he realized the mistake, but what he really needs is the Lantus Solopen sent in  Reports he does not use Lantus in a vial and has plently of apidra solostar  Please advise

## 2022-05-24 NOTE — TELEPHONE ENCOUNTER
Left a detailed vm for pt       E-Prescribing Status: Receipt confirmed by pharmacy (5/23/2022  4:29 PM EDT)

## 2022-05-31 ENCOUNTER — RA CDI HCC (OUTPATIENT)
Dept: OTHER | Facility: HOSPITAL | Age: 57
End: 2022-05-31

## 2022-05-31 NOTE — PROGRESS NOTES
[facility]  coding opportunities          Chart Reviewed number of suggestions sent to Provider: 2     Patients Insurance        Commercial Insurance: [id number]     [id number]  [id number]

## 2022-06-07 ENCOUNTER — TELEPHONE (OUTPATIENT)
Dept: ADMINISTRATIVE | Facility: OTHER | Age: 57
End: 2022-06-07

## 2022-06-07 ENCOUNTER — OFFICE VISIT (OUTPATIENT)
Dept: FAMILY MEDICINE CLINIC | Facility: CLINIC | Age: 57
End: 2022-06-07
Payer: COMMERCIAL

## 2022-06-07 VITALS
TEMPERATURE: 98.2 F | DIASTOLIC BLOOD PRESSURE: 78 MMHG | WEIGHT: 281.6 LBS | HEART RATE: 91 BPM | BODY MASS INDEX: 34.29 KG/M2 | SYSTOLIC BLOOD PRESSURE: 118 MMHG | OXYGEN SATURATION: 96 % | HEIGHT: 76 IN

## 2022-06-07 DIAGNOSIS — C73 THYROID CANCER (HCC): ICD-10-CM

## 2022-06-07 DIAGNOSIS — Z89.431 ACQUIRED ABSENCE OF RIGHT FOOT (HCC): ICD-10-CM

## 2022-06-07 DIAGNOSIS — E78.5 HYPERLIPIDEMIA, UNSPECIFIED HYPERLIPIDEMIA TYPE: ICD-10-CM

## 2022-06-07 DIAGNOSIS — E03.9 ACQUIRED HYPOTHYROIDISM: ICD-10-CM

## 2022-06-07 DIAGNOSIS — E11.42 TYPE 2 DIABETES MELLITUS WITH DIABETIC POLYNEUROPATHY, WITH LONG-TERM CURRENT USE OF INSULIN (HCC): ICD-10-CM

## 2022-06-07 DIAGNOSIS — M72.0 DUPUYTREN'S CONTRACTURE OF RIGHT HAND: ICD-10-CM

## 2022-06-07 DIAGNOSIS — Z79.4 TYPE 2 DIABETES MELLITUS WITH DIABETIC PERIPHERAL ANGIOPATHY WITHOUT GANGRENE, WITH LONG-TERM CURRENT USE OF INSULIN (HCC): ICD-10-CM

## 2022-06-07 DIAGNOSIS — D64.9 ANEMIA, UNSPECIFIED TYPE: ICD-10-CM

## 2022-06-07 DIAGNOSIS — F10.20 ALCOHOLISM (HCC): ICD-10-CM

## 2022-06-07 DIAGNOSIS — I10 HYPERTENSION, ESSENTIAL: Primary | ICD-10-CM

## 2022-06-07 DIAGNOSIS — I73.9 PAD (PERIPHERAL ARTERY DISEASE) (HCC): ICD-10-CM

## 2022-06-07 DIAGNOSIS — Z79.4 TYPE 2 DIABETES MELLITUS WITH DIABETIC POLYNEUROPATHY, WITH LONG-TERM CURRENT USE OF INSULIN (HCC): ICD-10-CM

## 2022-06-07 DIAGNOSIS — E11.51 TYPE 2 DIABETES MELLITUS WITH DIABETIC PERIPHERAL ANGIOPATHY WITHOUT GANGRENE, WITH LONG-TERM CURRENT USE OF INSULIN (HCC): ICD-10-CM

## 2022-06-07 PROBLEM — G54.6 PHANTOM PAIN AFTER AMPUTATION OF LOWER EXTREMITY (HCC): Status: RESOLVED | Noted: 2020-08-20 | Resolved: 2022-06-07

## 2022-06-07 PROBLEM — B95.61 STAPHYLOCOCCUS AUREUS BACTEREMIA: Status: RESOLVED | Noted: 2020-08-30 | Resolved: 2022-06-07

## 2022-06-07 PROBLEM — R42 VERTIGO: Status: RESOLVED | Noted: 2018-11-26 | Resolved: 2022-06-07

## 2022-06-07 PROBLEM — R78.81 STAPHYLOCOCCUS AUREUS BACTEREMIA: Status: RESOLVED | Noted: 2020-08-30 | Resolved: 2022-06-07

## 2022-06-07 PROCEDURE — 3008F BODY MASS INDEX DOCD: CPT | Performed by: FAMILY MEDICINE

## 2022-06-07 PROCEDURE — 3078F DIAST BP <80 MM HG: CPT | Performed by: FAMILY MEDICINE

## 2022-06-07 PROCEDURE — 1036F TOBACCO NON-USER: CPT | Performed by: FAMILY MEDICINE

## 2022-06-07 PROCEDURE — 99215 OFFICE O/P EST HI 40 MIN: CPT | Performed by: FAMILY MEDICINE

## 2022-06-07 PROCEDURE — 3074F SYST BP LT 130 MM HG: CPT | Performed by: FAMILY MEDICINE

## 2022-06-07 RX ORDER — NALTREXONE HYDROCHLORIDE 50 MG/1
50 TABLET, FILM COATED ORAL DAILY
Qty: 30 TABLET | Refills: 5 | Status: SHIPPED | OUTPATIENT
Start: 2022-06-07

## 2022-06-07 RX ORDER — INSULIN GLARGINE 100 [IU]/ML
INJECTION, SOLUTION SUBCUTANEOUS
Qty: 225 ML | Refills: 3 | Status: SHIPPED | OUTPATIENT
Start: 2022-06-07

## 2022-06-07 RX ORDER — BLOOD SUGAR DIAGNOSTIC
STRIP MISCELLANEOUS
Qty: 200 EACH | Refills: 3 | Status: SHIPPED | OUTPATIENT
Start: 2022-06-07

## 2022-06-07 NOTE — LETTER
Diabetic Eye Exam Form    Date Requested: 22  Patient: Be Diamond  Patient : 1965   Referring Provider: Felicia Hsieh MD    DIABETIC Eye Exam Date _______________________________    Type of Exam MUST be documented for Diabetic Eye Exams  Please CHECK ONE  Retinal Exam       Dilated Retinal Exam       OCT       Optomap-Iris Exam      Fundus Photography     Left Eye - Please check Retinopathy AND Type or No Retinopathy      Exam did show retinopathy    Exam did not show retinopathy         Mild     Proliferative           Moderate    Severe            None         Right Eye - Please check Retinopathy AND Type or No Retinopathy     Exam did show retinopathy    Exam did not show retinopathy         Mild     Proliferative        Moderate    Severe        None       Comments __________________________________________________________    Practice Providing Exam ______________________________________________    Exam Performed By (print name) _______________________________________      Provider Signature ___________________________________________________    These reports are needed for  compliance  Please fax this completed form and a copy of the Diabetic Eye Exam report to our office located at [place] [address] as soon as possible via [phone] jhoana Davila: Phone [phone]  We thank you for your assistance in treating our mutual patient

## 2022-06-07 NOTE — TELEPHONE ENCOUNTER
----- Message from Richa Cheng sent at 6/7/2022 11:21 AM EDT -----  Regarding: Eye Exam  06/07/22 11:22 AM    Hello, our patient Emilee Olmedo has had Diabetic Eye Exam completed/performed  Please assist in updating the patient chart by making an External outreach to [facility] facility located in [facility]  Phone number [phone]  The date of service is February or March 2022  Patient could not remember date of service       Thank you,  Richa MARINO [address] [facility]

## 2022-06-07 NOTE — PATIENT INSTRUCTIONS

## 2022-06-07 NOTE — PROGRESS NOTES
BMI Counseling: Body mass index is 34 28 kg/m²  The BMI is above normal  Nutrition recommendations include decreasing portion sizes and moderation in carbohydrate intake  Exercise recommendations include exercising 3-5 times per week  No pharmacotherapy was ordered  Rationale for BMI follow-up plan is due to patient being overweight or obese  Assessment/Plan:    Return visit in 3 months  He is get fasting blood work done that was previously prescribed and we will call with results     Problem List Items Addressed This Visit        Endocrine    Type 2 diabetes mellitus with diabetic polyneuropathy, with long-term current use of insulin ([facility])    Relevant Medications    insulin glargine (Lantus SoloStar) 100 units/mL injection pen    Thyroid cancer ([facility])    Acquired hypothyroidism     Continue levothyroxine 150 mcg daily           Type 2 diabetes mellitus with diabetic peripheral angiopathy without gangrene ([facility])    Relevant Medications    insulin glargine (Lantus SoloStar) 100 units/mL injection pen    glucose blood (OneTouch Verio) test strip       Cardiovascular and Mediastinum    Hypertension, essential - Primary     Continue Avalide 150-12 5 and verapamil p m  360 mg daily           PAD (peripheral artery disease) ([facility])     Continue Plavix 75 mg daily              Musculoskeletal and Integument    Dupuytren's contracture of right hand       Other    Hyperlipidemia     Continue Lipitor 40 mg daily           Anemia    Acquired absence of right foot ([facility] [address] )      Other Visit Diagnoses     Alcoholism ([facility] [address] )        Relevant Medications    naltrexone (REVIA) 50 mg tablet            Subjective:      Patient ID: Ricky Olguin is a 59 y o  male  Patient comes in for checkup  His blood sugars are running in the 300s  He ran out of his Lantus for about 1 month  He is concerned regarding heavy drinking which he does mostly at night drinking at least 5 beers and several shots of hard alcohol    He does admit to being an alcoholic and has been in rehab in the past       The following portions of the patient's history were reviewed and updated as appropriate:   Past Medical History:  He has a past medical history of Chronic kidney disease, Colon polyp, Diabetes mellitus ([address] ), Hyperlipidemia, Hypertension, and Thyroid cancer ([facility] [address] )  ,  _______________________________________________________________________  Medical Problems:  does not have any pertinent problems on file ,  _______________________________________________________________________  Past Surgical History:   has a past surgical history that includes Foot surgery (Right, 2014); US guided thyroid biopsy (7/2/2020); IR aortagram with run-off (8/6/2020); Toe amputation (Right, 8/11/2020); pr amputation foot,transmetatarsal (Right, 9/3/2020); IR tunneled central line placement (9/8/2020); IR tunneled central line removal (9/28/2020); pr thyroidectomy (N/A, 2/3/2021); and Colonoscopy  ,  _______________________________________________________________________  Family History:  family history includes Cancer in his mother; Hypertension in his father ,  _______________________________________________________________________  Social History:   reports that he has never smoked  He has never used smokeless tobacco  He reports previous alcohol use of about 8 0 standard drinks of alcohol per week  He reports current drug use  Drug: Marijuana  ,  _______________________________________________________________________  Allergies:  is allergic to pollen extract     _______________________________________________________________________  Current Outpatient Medications   Medication Sig Dispense Refill    BD PEN NEEDLE DEV U/F 32G X 4 MM MISC Inject under the skin 4 (four) times a day 400 each 1    glucose blood (OneTouch Verio) test strip Check blood sugars twice daily  Please substitute with appropriate alternative as covered by patient's insurance   Dx: E11 65 200 each 3    insulin glargine (Lantus SoloStar) 100 units/mL injection pen Inject 75 units subcutaneously daily 225 mL 3    insulin glulisine (Apidra SoloStar) 100 units/mL injection pen INJECT 20 UNITS UNDER THE SKIN THREE TIMES A DAY WITH MEALS 60 mL 0    insulin glulisine (Apidra) 100 units/mL injection Inject 30 units tid with meals 60 mL 0    irbesartan-hydrochlorothiazide (AVALIDE) 150-12 5 MG per tablet Take 1 tablet by mouth daily 90 tablet 3    levothyroxine 150 mcg tablet Take 1 tablet (150 mcg total) by mouth daily 90 tablet 3    naltrexone (REVIA) 50 mg tablet Take 1 tablet (50 mg total) by mouth daily 30 tablet 5    omeprazole (PriLOSEC) 40 MG capsule TAKE 1 CAPSULE DAILY 90 capsule 3    verapamil (VERELAN PM) 360 MG 24 hr capsule Take 1 capsule (360 mg total) by mouth daily at bedtime 90 capsule 3    zolpidem (AMBIEN) 10 mg tablet Take 1 tablet (10 mg total) by mouth daily at bedtime as needed for sleep 30 tablet 3    aspirin 81 mg chewable tablet Chew 1 tablet (81 mg total) daily 30 tablet 0    atorvastatin (LIPITOR) 40 mg tablet Take 1 tablet (40 mg total) by mouth daily with dinner 90 tablet 3    Blood Glucose Monitoring Suppl (ONE TOUCH ULTRA 2) w/Device KIT by Does not apply route 2 (two) times a day 1 each 0    clopidogrel (PLAVIX) 75 mg tablet Take 1 tablet (75 mg total) by mouth daily 90 tablet 3     No current facility-administered medications for this visit      _______________________________________________________________________  Review of Systems   Constitutional: Negative  HENT: Negative  Eyes: Positive for redness and itching  Respiratory: Negative  Cardiovascular: Negative  Gastrointestinal: Negative  Endocrine: Positive for polyuria  Musculoskeletal: Positive for gait problem  Allergic/Immunologic: Positive for environmental allergies  Neurological: Negative for dizziness, seizures, syncope and headaches  Hematological: Negative      Psychiatric/Behavioral: Positive for dysphoric mood  Objective:  Vitals:    06/07/22 1046 06/07/22 1145   BP: 132/90 118/78   BP Location: Left arm    Patient Position: Sitting    Cuff Size: Standard    Pulse: 91    Temp: 98 2 °F (36 8 °C)    TempSrc: Tympanic    SpO2: 96%    Weight: 128 kg (281 lb 9 6 oz)    Height: 6' 4" (1 93 m)      Body mass index is 34 28 kg/m²  Physical Exam  Constitutional:       Appearance: He is well-developed  HENT:      Head: Normocephalic and atraumatic  Eyes:      Pupils: Pupils are equal, round, and reactive to light  Cardiovascular:      Rate and Rhythm: Normal rate and regular rhythm  Heart sounds: Normal heart sounds  Pulmonary:      Effort: Pulmonary effort is normal       Breath sounds: Normal breath sounds  Abdominal:      General: Bowel sounds are normal       Palpations: Abdomen is soft  Musculoskeletal:      Cervical back: Neck supple  Comments: Right foot amputation  Ambulates with a cane  Flexion contracture right 5th finger   Skin:     General: Skin is warm and dry  Neurological:      Mental Status: He is alert and oriented to person, place, and time     Psychiatric:         Mood and Affect: Mood normal          Behavior: Behavior normal

## 2022-06-08 ENCOUNTER — VBI (OUTPATIENT)
Dept: ADMINISTRATIVE | Facility: OTHER | Age: 57
End: 2022-06-08

## 2022-06-08 NOTE — TELEPHONE ENCOUNTER
Upon review of the In Basket request and the patient's chart, initial outreach has been made via fax, please see Contacts section for details       Thank you  Heaven Perez MA

## 2022-06-08 NOTE — TELEPHONE ENCOUNTER
06/08/22 9:50 AM     See documentation in the VB [employer] SmartForm       [facility], [place]

## 2022-06-14 NOTE — TELEPHONE ENCOUNTER
Upon review of the In Basket request we non dilated exam    Any additional questions or concerns should be emailed to the Practice Liaisons via [email or web address] email, please do not reply via In Basket      Thank you  Nicci Laureano MA

## 2022-10-13 DIAGNOSIS — I10 HYPERTENSION, ESSENTIAL: ICD-10-CM

## 2022-10-13 RX ORDER — VERAPAMIL HYDROCHLORIDE 360 MG/1
CAPSULE, DELAYED RELEASE PELLETS ORAL
Qty: 90 CAPSULE | Refills: 3 | Status: SHIPPED | OUTPATIENT
Start: 2022-10-13

## 2022-11-10 ENCOUNTER — TELEPHONE (OUTPATIENT)
Dept: FAMILY MEDICINE CLINIC | Facility: CLINIC | Age: 57
End: 2022-11-10

## 2022-11-10 DIAGNOSIS — Z79.4 TYPE 2 DIABETES MELLITUS WITH DIABETIC PERIPHERAL ANGIOPATHY WITHOUT GANGRENE, WITH LONG-TERM CURRENT USE OF INSULIN (HCC): ICD-10-CM

## 2022-11-10 DIAGNOSIS — E03.9 ACQUIRED HYPOTHYROIDISM: ICD-10-CM

## 2022-11-10 DIAGNOSIS — E11.42 TYPE 2 DIABETES MELLITUS WITH DIABETIC POLYNEUROPATHY, WITH LONG-TERM CURRENT USE OF INSULIN (HCC): Primary | ICD-10-CM

## 2022-11-10 DIAGNOSIS — E11.51 TYPE 2 DIABETES MELLITUS WITH DIABETIC PERIPHERAL ANGIOPATHY WITHOUT GANGRENE, WITH LONG-TERM CURRENT USE OF INSULIN (HCC): ICD-10-CM

## 2022-11-10 DIAGNOSIS — Z12.5 PROSTATE CANCER SCREENING: ICD-10-CM

## 2022-11-10 DIAGNOSIS — E78.5 HYPERLIPIDEMIA, UNSPECIFIED HYPERLIPIDEMIA TYPE: ICD-10-CM

## 2022-11-10 DIAGNOSIS — Z79.4 TYPE 2 DIABETES MELLITUS WITH DIABETIC POLYNEUROPATHY, WITH LONG-TERM CURRENT USE OF INSULIN (HCC): Primary | ICD-10-CM

## 2022-11-11 DIAGNOSIS — E03.9 ACQUIRED HYPOTHYROIDISM: ICD-10-CM

## 2022-11-12 ENCOUNTER — NURSE TRIAGE (OUTPATIENT)
Dept: OTHER | Facility: OTHER | Age: 57
End: 2022-11-12

## 2022-11-12 RX ORDER — LEVOTHYROXINE SODIUM 0.15 MG/1
150 TABLET ORAL DAILY
Qty: 90 TABLET | Refills: 3 | Status: SHIPPED | OUTPATIENT
Start: 2022-11-12 | End: 2022-11-15 | Stop reason: SDUPTHER

## 2022-11-12 NOTE — TELEPHONE ENCOUNTER
Regarding: Out of Levothyroxine   ----- Message from Chano Piña sent at 11/12/2022 12:26 PM EST -----  "I called yesterday for my medication of Levothyroxine Sodium 150 mcg Oral Daily  And it was never called into my pharmacy and I am out  I need that medication other wise I feel terrible and I can't get out of bed   Can I get a few until my doctor puts my order in"

## 2022-11-12 NOTE — TELEPHONE ENCOUNTER
Reason for Disposition  • [1] Caller requesting a prescription renewal (no refills left), no triage required, AND [2] triager able to renew prescription per department policy    Answer Assessment - Initial Assessment Questions  1  DRUG NAME: "What medicine do you need to have refilled?"      Levothyroxine 150mcg    2  REFILLS REMAINING: "How many refills are remaining?" (Note: The label on the medicine or pill bottle will show how many refills are remaining  If there are no refills remaining, then a renewal may be needed )    0    3  PRESCRIBING HCP: "Who prescribed it?" Reason: If prescribed by specialist, call should be referred to that group  Dr Jessica Nolan    4  SYMPTOMS: "Do you have any symptoms?"   no    Protocols used: MEDICATION REFILL AND RENEWAL CALL-[facility]-    Medication refill request approved  Receipt confirmed  Patient made aware

## 2022-11-14 ENCOUNTER — APPOINTMENT (OUTPATIENT)
Dept: LAB | Facility: HOSPITAL | Age: 57
End: 2022-11-14

## 2022-11-14 DIAGNOSIS — Z79.4 TYPE 2 DIABETES MELLITUS WITH DIABETIC POLYNEUROPATHY, WITH LONG-TERM CURRENT USE OF INSULIN (HCC): ICD-10-CM

## 2022-11-14 DIAGNOSIS — E11.42 TYPE 2 DIABETES MELLITUS WITH DIABETIC POLYNEUROPATHY, WITH LONG-TERM CURRENT USE OF INSULIN (HCC): ICD-10-CM

## 2022-11-14 DIAGNOSIS — Z12.5 PROSTATE CANCER SCREENING: ICD-10-CM

## 2022-11-14 DIAGNOSIS — E78.5 HYPERLIPIDEMIA, UNSPECIFIED HYPERLIPIDEMIA TYPE: ICD-10-CM

## 2022-11-14 DIAGNOSIS — E03.9 ACQUIRED HYPOTHYROIDISM: ICD-10-CM

## 2022-11-14 LAB
ALBUMIN SERPL BCP-MCNC: 3.7 G/DL (ref 3.5–5)
ALP SERPL-CCNC: 89 U/L (ref 46–116)
ALT SERPL W P-5'-P-CCNC: 91 U/L (ref 12–78)
ANION GAP SERPL CALCULATED.3IONS-SCNC: 8 MMOL/L (ref 4–13)
AST SERPL W P-5'-P-CCNC: 96 U/L (ref 5–45)
BASOPHILS # BLD AUTO: 0.06 THOUSANDS/ÂΜL (ref 0–0.1)
BASOPHILS NFR BLD AUTO: 1 % (ref 0–1)
BILIRUB SERPL-MCNC: 0.67 MG/DL (ref 0.2–1)
BUN SERPL-MCNC: 29 MG/DL (ref 5–25)
CALCIUM SERPL-MCNC: 9.8 MG/DL (ref 8.3–10.1)
CHLORIDE SERPL-SCNC: 98 MMOL/L (ref 96–108)
CHOLEST SERPL-MCNC: 241 MG/DL
CO2 SERPL-SCNC: 25 MMOL/L (ref 21–32)
CREAT SERPL-MCNC: 1.69 MG/DL (ref 0.6–1.3)
EOSINOPHIL # BLD AUTO: 0.13 THOUSAND/ÂΜL (ref 0–0.61)
EOSINOPHIL NFR BLD AUTO: 2 % (ref 0–6)
ERYTHROCYTE [DISTWIDTH] IN BLOOD BY AUTOMATED COUNT: 12.5 % (ref 11.6–15.1)
GFR SERPL CREATININE-BSD FRML MDRD: 43 ML/MIN/1.73SQ M
GLUCOSE P FAST SERPL-MCNC: 380 MG/DL (ref 65–99)
HCT VFR BLD AUTO: 45.7 % (ref 36.5–49.3)
HDLC SERPL-MCNC: 36 MG/DL
HGB BLD-MCNC: 15.2 G/DL (ref 12–17)
IMM GRANULOCYTES # BLD AUTO: 0.04 THOUSAND/UL (ref 0–0.2)
IMM GRANULOCYTES NFR BLD AUTO: 1 % (ref 0–2)
LDLC SERPL CALC-MCNC: 157 MG/DL (ref 0–100)
LYMPHOCYTES # BLD AUTO: 1.91 THOUSANDS/ÂΜL (ref 0.6–4.47)
LYMPHOCYTES NFR BLD AUTO: 27 % (ref 14–44)
MCH RBC QN AUTO: 32.5 PG (ref 26.8–34.3)
MCHC RBC AUTO-ENTMCNC: 33.3 G/DL (ref 31.4–37.4)
MCV RBC AUTO: 98 FL (ref 82–98)
MONOCYTES # BLD AUTO: 0.86 THOUSAND/ÂΜL (ref 0.17–1.22)
MONOCYTES NFR BLD AUTO: 12 % (ref 4–12)
NEUTROPHILS # BLD AUTO: 4.21 THOUSANDS/ÂΜL (ref 1.85–7.62)
NEUTS SEG NFR BLD AUTO: 57 % (ref 43–75)
NONHDLC SERPL-MCNC: 205 MG/DL
NRBC BLD AUTO-RTO: 0 /100 WBCS
PLATELET # BLD AUTO: 239 THOUSANDS/UL (ref 149–390)
PMV BLD AUTO: 10.9 FL (ref 8.9–12.7)
POTASSIUM SERPL-SCNC: 5.9 MMOL/L (ref 3.5–5.3)
PROT SERPL-MCNC: 8.3 G/DL (ref 6.4–8.4)
PSA SERPL-MCNC: 0.4 NG/ML (ref 0–4)
RBC # BLD AUTO: 4.67 MILLION/UL (ref 3.88–5.62)
SODIUM SERPL-SCNC: 131 MMOL/L (ref 135–147)
T4 FREE SERPL-MCNC: 0.84 NG/DL (ref 0.76–1.46)
TRIGL SERPL-MCNC: 238 MG/DL
TSH SERPL DL<=0.05 MIU/L-ACNC: 35.4 UIU/ML (ref 0.45–4.5)
WBC # BLD AUTO: 7.21 THOUSAND/UL (ref 4.31–10.16)

## 2022-11-15 ENCOUNTER — OFFICE VISIT (OUTPATIENT)
Dept: FAMILY MEDICINE CLINIC | Facility: CLINIC | Age: 57
End: 2022-11-15

## 2022-11-15 VITALS
TEMPERATURE: 97.2 F | DIASTOLIC BLOOD PRESSURE: 98 MMHG | BODY MASS INDEX: 34.58 KG/M2 | OXYGEN SATURATION: 96 % | WEIGHT: 284 LBS | HEIGHT: 76 IN | HEART RATE: 90 BPM | SYSTOLIC BLOOD PRESSURE: 140 MMHG

## 2022-11-15 DIAGNOSIS — E87.5 HYPERKALEMIA: ICD-10-CM

## 2022-11-15 DIAGNOSIS — E03.9 ACQUIRED HYPOTHYROIDISM: ICD-10-CM

## 2022-11-15 DIAGNOSIS — R74.8 ELEVATED LIVER ENZYMES: ICD-10-CM

## 2022-11-15 DIAGNOSIS — D64.9 ANEMIA, UNSPECIFIED TYPE: ICD-10-CM

## 2022-11-15 DIAGNOSIS — F10.20 ALCOHOLISM (HCC): ICD-10-CM

## 2022-11-15 DIAGNOSIS — Z23 NEED FOR COVID-19 VACCINE: ICD-10-CM

## 2022-11-15 DIAGNOSIS — N18.32 STAGE 3B CHRONIC KIDNEY DISEASE (HCC): ICD-10-CM

## 2022-11-15 DIAGNOSIS — E11.42 TYPE 2 DIABETES MELLITUS WITH DIABETIC POLYNEUROPATHY, WITH LONG-TERM CURRENT USE OF INSULIN (HCC): ICD-10-CM

## 2022-11-15 DIAGNOSIS — I10 HYPERTENSION, ESSENTIAL: ICD-10-CM

## 2022-11-15 DIAGNOSIS — Z79.4 TYPE 2 DIABETES MELLITUS WITH DIABETIC PERIPHERAL ANGIOPATHY WITHOUT GANGRENE, WITH LONG-TERM CURRENT USE OF INSULIN (HCC): ICD-10-CM

## 2022-11-15 DIAGNOSIS — E11.51 TYPE 2 DIABETES MELLITUS WITH DIABETIC PERIPHERAL ANGIOPATHY WITHOUT GANGRENE, WITH LONG-TERM CURRENT USE OF INSULIN (HCC): ICD-10-CM

## 2022-11-15 DIAGNOSIS — Z89.431 ACQUIRED ABSENCE OF RIGHT FOOT (HCC): ICD-10-CM

## 2022-11-15 DIAGNOSIS — E78.5 HYPERLIPIDEMIA, UNSPECIFIED HYPERLIPIDEMIA TYPE: ICD-10-CM

## 2022-11-15 DIAGNOSIS — Z23 NEED FOR INFLUENZA VACCINATION: Primary | ICD-10-CM

## 2022-11-15 DIAGNOSIS — Z79.4 TYPE 2 DIABETES MELLITUS WITH DIABETIC POLYNEUROPATHY, WITH LONG-TERM CURRENT USE OF INSULIN (HCC): ICD-10-CM

## 2022-11-15 DIAGNOSIS — K21.9 GASTROESOPHAGEAL REFLUX DISEASE WITHOUT ESOPHAGITIS: ICD-10-CM

## 2022-11-15 DIAGNOSIS — Z00.00 HEALTH MAINTENANCE EXAMINATION: ICD-10-CM

## 2022-11-15 DIAGNOSIS — I73.9 PAD (PERIPHERAL ARTERY DISEASE) (HCC): ICD-10-CM

## 2022-11-15 LAB
C PEPTIDE SERPL-MCNC: 5.2 NG/ML (ref 1.1–4.4)
EST. AVERAGE GLUCOSE BLD GHB EST-MCNC: 272 MG/DL
HBA1C MFR BLD: 11.1 %

## 2022-11-15 RX ORDER — INSULIN GLARGINE 100 [IU]/ML
INJECTION, SOLUTION SUBCUTANEOUS
Qty: 225 ML | Refills: 3 | Status: SHIPPED | OUTPATIENT
Start: 2022-11-15

## 2022-11-15 RX ORDER — LEVOTHYROXINE SODIUM 0.15 MG/1
150 TABLET ORAL DAILY
Qty: 90 TABLET | Refills: 3 | Status: SHIPPED | OUTPATIENT
Start: 2022-11-15

## 2022-11-15 RX ORDER — CLOPIDOGREL BISULFATE 75 MG/1
75 TABLET ORAL DAILY
Qty: 90 TABLET | Refills: 3 | Status: SHIPPED | OUTPATIENT
Start: 2022-11-15 | End: 2022-12-15

## 2022-11-15 RX ORDER — ATORVASTATIN CALCIUM 40 MG/1
40 TABLET, FILM COATED ORAL
Qty: 90 TABLET | Refills: 3 | Status: SHIPPED | OUTPATIENT
Start: 2022-11-15 | End: 2022-12-15

## 2022-11-15 NOTE — PATIENT INSTRUCTIONS

## 2022-11-15 NOTE — PROGRESS NOTES
Name: Elidia Ken      : 1965      MRN: [id number]  Encounter Provider: Anupama Curiel MD  Encounter Date: 11/15/2022   Encounter department: [address] [place] [address]   Return visit in 3 months with fasting blood work prior to visit  1  Need for influenza vaccination  -     influenza vaccine, quadrivalent, recombinant, PF, 0 5 mL, for patients 18 yr+ (FLUBLOK)    2  Health maintenance examination    3  Gastroesophageal reflux disease without esophagitis    4  Type 2 diabetes mellitus with diabetic polyneuropathy, with long-term current use of insulin ([facility])  Assessment & Plan:  Lantus is increased to 45 units b i d   Continue a PD breath 30 units with meals  Lab Results   Component Value Date    HGBA1C 11 1 (H) 2022       Orders:  -     Insulin Glargine Solostar (Lantus SoloStar) 100 UNIT/ML SOPN; Inject 45 units bid    5  Type 2 diabetes mellitus with diabetic peripheral angiopathy without gangrene, with long-term current use of insulin ([facility])  -     Microalbumin / creatinine urine ratio  -     Hemoglobin A1C; Future    6  Acquired hypothyroidism  -     levothyroxine 150 mcg tablet; Take 1 tablet (150 mcg total) by mouth daily    7  PAD (peripheral artery disease) ([facility])  -     clopidogrel (PLAVIX) 75 mg tablet; Take 1 tablet (75 mg total) by mouth daily    8  Hypertension, essential    9  Anemia, unspecified type  -     CBC and differential; Future    10  Hyperlipidemia, unspecified hyperlipidemia type  -     atorvastatin (LIPITOR) 40 mg tablet; Take 1 tablet (40 mg total) by mouth daily with dinner  -     Lipid panel; Future  -     Comprehensive metabolic panel; Future    11  Acquired absence of right foot ([facility] )    12  Elevated liver enzymes    13  Stage 3b chronic kidney disease ([facility] [address] )  -     Ambulatory Referral to Nephrology; Future    14  Hyperkalemia  -     Ambulatory Referral to Nephrology; Future    15   Need for COVID-19 vaccine  - Age 15 y+, BOOSTER (BIVALENT): COVID-19 Pfizer vac bivalent celestino-sucr    16  Alcoholism [facility])  Assessment & Plan:  Start naltrexone 50 mg daily        Depression Screening and Follow-up Plan: Patient was screened for depression during today's encounter  They screened negative with a PHQ-2 score of 0  Subjective      Patient comes in for checkup  He was given naltrexone last visit for his alcoholism  He has not started this  Review of Systems   Constitutional: Negative  Respiratory: Negative  Cardiovascular: Negative  Musculoskeletal: Positive for gait problem  Current Outpatient Medications on File Prior to Visit   Medication Sig   • BD PEN NEEDLE DEV U/F 32G X 4 MM MISC Inject under the skin 4 (four) times a day   • Blood Glucose Monitoring Suppl (ONE TOUCH ULTRA 2) w/Device KIT by Does not apply route 2 (two) times a day   • glucose blood (OneTouch Verio) test strip Check blood sugars twice daily  Please substitute with appropriate alternative as covered by patient's insurance   Dx: E11 65   • insulin glulisine (Apidra SoloStar) 100 units/mL injection pen INJECT 20 UNITS UNDER THE SKIN THREE TIMES A DAY WITH MEALS   • insulin glulisine (Apidra) 100 units/mL injection Inject 30 units tid with meals   • irbesartan-hydrochlorothiazide (AVALIDE) 150-12 5 MG per tablet Take 1 tablet by mouth daily   • naltrexone (REVIA) 50 mg tablet Take 1 tablet (50 mg total) by mouth daily   • omeprazole (PriLOSEC) 40 MG capsule TAKE 1 CAPSULE DAILY   • verapamil (VERELAN PM) 360 MG 24 hr capsule TAKE 1 CAPSULE DAILY AT BEDTIME   • zolpidem (AMBIEN) 10 mg tablet Take 1 tablet (10 mg total) by mouth daily at bedtime as needed for sleep   • [DISCONTINUED] insulin glargine (Lantus SoloStar) 100 units/mL injection pen Inject 75 units subcutaneously daily   • [DISCONTINUED] levothyroxine 150 mcg tablet Take 1 tablet (150 mcg total) by mouth daily   • aspirin 81 mg chewable tablet Chew 1 tablet (81 mg total) daily   • [DISCONTINUED] atorvastatin (LIPITOR) 40 mg tablet Take 1 tablet (40 mg total) by mouth daily with dinner   • [DISCONTINUED] clopidogrel (PLAVIX) 75 mg tablet Take 1 tablet (75 mg total) by mouth daily       Objective     /98 (BP Location: Left arm, Patient Position: Sitting, Cuff Size: Standard)   Pulse 90   Temp (!) 97 2 °F (36 2 °C) (Tympanic)   Ht 6' 4" (1 93 m)   Wt 129 kg (284 lb)   SpO2 96%   BMI 34 57 kg/m²     Physical Exam  Constitutional:       Appearance: He is well-developed  He is obese  HENT:      Head: Normocephalic and atraumatic  Eyes:      Pupils: Pupils are equal, round, and reactive to light  Cardiovascular:      Rate and Rhythm: Normal rate and regular rhythm  Pulses: Pulses are weak  Dorsalis pedis pulses are 0 on the left side  Posterior tibial pulses are 0 on the left side  Heart sounds: Normal heart sounds  Pulmonary:      Effort: Pulmonary effort is normal       Breath sounds: Normal breath sounds  Abdominal:      General: Bowel sounds are normal       Palpations: Abdomen is soft  Musculoskeletal:         General: Normal range of motion  Cervical back: Neck supple  Feet:      Right foot: amputated     Left foot:      Skin integrity: No ulcer, skin breakdown, erythema, warmth, callus or dry skin  Skin:     General: Skin is warm and dry  Neurological:      Mental Status: He is alert  Psychiatric:         Mood and Affect: Mood normal      Diabetic Foot Exam    Patient's shoes and socks removed  Right Foot/Ankle   Right Foot Inspection  Amputation: amputation right foot     Left Foot/Ankle  Left Foot Inspection  Skin Exam: skin normal and skin intact  No dry skin, no warmth, no erythema, no maceration, normal color, no pre-ulcer, no ulcer and no callus  Toe Exam: ROM and strength within normal limits       Sensory   Vibration: diminished  Proprioception: diminished  Monofilament testing: diminished    Vascular  The left DP pulse is 0  The left PT pulse is 0       Assign Risk Category  No deformity present  Loss of protective sensation  Weak pulses  Risk: 2    Sukhdev Baptiste MD

## 2022-11-15 NOTE — ASSESSMENT & PLAN NOTE
Lantus is increased to 45 units b i d   Continue a PD breath 30 units with meals  Lab Results   Component Value Date    HGBA1C 11 1 (H) 11/14/2022

## 2022-11-21 ENCOUNTER — TELEPHONE (OUTPATIENT)
Dept: NEPHROLOGY | Facility: CLINIC | Age: 57
End: 2022-11-21

## 2022-11-21 NOTE — TELEPHONE ENCOUNTER
I called and left message on patient answering machine to return our call about scheduling a Nephrology New Patient Consult Ref by Dr Tori Chiu for - Stage 3b chronic kidney disease  - Hyperkalemia

## 2022-11-22 ENCOUNTER — TELEPHONE (OUTPATIENT)
Dept: NEPHROLOGY | Facility: CLINIC | Age: 57
End: 2022-11-22

## 2022-11-22 NOTE — TELEPHONE ENCOUNTER
Ias called and left message on patient answering machine to return our call about scheduling a Nephrology New Patient Consult Ref by Dr Swati Kirkpatrick for - Stage 3b chronic kidney disease  - Hyperkalemia  Called 3xs  Letter was sent to patients address   Referral closed

## 2022-11-22 NOTE — TELEPHONE ENCOUNTER
Patient did return our call about patient was scheduled for a Nephrology Consult with Dr Erin Vaz  Patient understood and was okay with the day and time of his next appointment and patient was also add to the wait list

## 2023-01-04 DIAGNOSIS — K21.9 GASTROESOPHAGEAL REFLUX DISEASE WITHOUT ESOPHAGITIS: ICD-10-CM

## 2023-01-04 RX ORDER — OMEPRAZOLE 40 MG/1
CAPSULE, DELAYED RELEASE ORAL
Qty: 90 CAPSULE | Refills: 3 | Status: SHIPPED | OUTPATIENT
Start: 2023-01-04

## 2023-01-25 ENCOUNTER — TELEPHONE (OUTPATIENT)
Dept: NEPHROLOGY | Facility: CLINIC | Age: 58
End: 2023-01-25

## 2023-01-25 NOTE — TELEPHONE ENCOUNTER
I called [facility] BC/BS [employer] PPO @ [phone] (Automated System) to check eligible for the patient and as of 1/25/2023 the patient has current active coverage as of 4/1/2001  Suha Khanna,

## 2023-01-31 ENCOUNTER — HOSPITAL ENCOUNTER (EMERGENCY)
Facility: HOSPITAL | Age: 58
Discharge: HOME | End: 2023-01-31
Attending: EMERGENCY MEDICINE

## 2023-01-31 ENCOUNTER — APPOINTMENT (EMERGENCY)
Dept: CT IMAGING | Facility: HOSPITAL | Age: 58
End: 2023-01-31

## 2023-01-31 ENCOUNTER — APPOINTMENT (OUTPATIENT)
Dept: RADIOLOGY | Facility: HOSPITAL | Age: 58
End: 2023-01-31

## 2023-01-31 ENCOUNTER — TELEPHONE (OUTPATIENT)
Dept: NEPHROLOGY | Facility: CLINIC | Age: 58
End: 2023-01-31

## 2023-01-31 VITALS
BODY MASS INDEX: 34.57 KG/M2 | RESPIRATION RATE: 16 BRPM | HEIGHT: 76 IN | DIASTOLIC BLOOD PRESSURE: 59 MMHG | TEMPERATURE: 97.3 F | OXYGEN SATURATION: 95 % | SYSTOLIC BLOOD PRESSURE: 95 MMHG | HEART RATE: 73 BPM

## 2023-01-31 DIAGNOSIS — S82.141A TIBIAL PLATEAU FRACTURE, RIGHT: Primary | ICD-10-CM

## 2023-01-31 DIAGNOSIS — N18.30 STAGE 3 CHRONIC KIDNEY DISEASE, UNSPECIFIED WHETHER STAGE 3A OR 3B CKD (HCC): Primary | ICD-10-CM

## 2023-01-31 LAB
ABO GROUP BLD: NORMAL
ABO GROUP BLD: NORMAL
ALBUMIN SERPL BCP-MCNC: 3.4 G/DL (ref 3.5–5)
ALP SERPL-CCNC: 72 U/L (ref 46–116)
ALT SERPL W P-5'-P-CCNC: 36 U/L (ref 12–78)
ANION GAP SERPL CALCULATED.3IONS-SCNC: 14 MMOL/L (ref 4–13)
AST SERPL W P-5'-P-CCNC: 29 U/L (ref 5–45)
BASOPHILS # BLD AUTO: 0.05 THOUSANDS/ÂΜL (ref 0–0.1)
BASOPHILS NFR BLD AUTO: 0 % (ref 0–1)
BILIRUB SERPL-MCNC: 1.3 MG/DL (ref 0.2–1)
BLD GP AB SCN SERPL QL: NEGATIVE
BUN SERPL-MCNC: 36 MG/DL (ref 5–25)
CALCIUM ALBUM COR SERPL-MCNC: 9.5 MG/DL (ref 8.3–10.1)
CALCIUM SERPL-MCNC: 9 MG/DL (ref 8.3–10.1)
CHLORIDE SERPL-SCNC: 93 MMOL/L (ref 96–108)
CO2 SERPL-SCNC: 24 MMOL/L (ref 21–32)
CREAT SERPL-MCNC: 2 MG/DL (ref 0.6–1.3)
EOSINOPHIL # BLD AUTO: 0.05 THOUSAND/ÂΜL (ref 0–0.61)
EOSINOPHIL NFR BLD AUTO: 0 % (ref 0–6)
ERYTHROCYTE [DISTWIDTH] IN BLOOD BY AUTOMATED COUNT: 12.3 % (ref 11.6–15.1)
GFR SERPL CREATININE-BSD FRML MDRD: 35 ML/MIN/1.73SQ M
GLUCOSE SERPL-MCNC: 334 MG/DL (ref 65–140)
HCT VFR BLD AUTO: 37.1 % (ref 36.5–49.3)
HGB BLD-MCNC: 12.7 G/DL (ref 12–17)
IMM GRANULOCYTES # BLD AUTO: 0.12 THOUSAND/UL (ref 0–0.2)
IMM GRANULOCYTES NFR BLD AUTO: 1 % (ref 0–2)
LYMPHOCYTES # BLD AUTO: 1.33 THOUSANDS/ÂΜL (ref 0.6–4.47)
LYMPHOCYTES NFR BLD AUTO: 12 % (ref 14–44)
MCH RBC QN AUTO: 32.6 PG (ref 26.8–34.3)
MCHC RBC AUTO-ENTMCNC: 34.2 G/DL (ref 31.4–37.4)
MCV RBC AUTO: 95 FL (ref 82–98)
MONOCYTES # BLD AUTO: 1.5 THOUSAND/ÂΜL (ref 0.17–1.22)
MONOCYTES NFR BLD AUTO: 13 % (ref 4–12)
NEUTROPHILS # BLD AUTO: 8.13 THOUSANDS/ÂΜL (ref 1.85–7.62)
NEUTS SEG NFR BLD AUTO: 74 % (ref 43–75)
NRBC BLD AUTO-RTO: 0 /100 WBCS
PLATELET # BLD AUTO: 235 THOUSANDS/UL (ref 149–390)
PMV BLD AUTO: 10.7 FL (ref 8.9–12.7)
POTASSIUM SERPL-SCNC: 4.1 MMOL/L (ref 3.5–5.3)
PROT SERPL-MCNC: 7.9 G/DL (ref 6.4–8.4)
RBC # BLD AUTO: 3.89 MILLION/UL (ref 3.88–5.62)
RH BLD: NEGATIVE
RH BLD: NEGATIVE
SODIUM SERPL-SCNC: 131 MMOL/L (ref 135–147)
SPECIMEN EXPIRATION DATE: NORMAL
WBC # BLD AUTO: 11.18 THOUSAND/UL (ref 4.31–10.16)

## 2023-01-31 RX ORDER — MORPHINE SULFATE 15 MG/1
15 TABLET ORAL EVERY 6 HOURS PRN
Qty: 28 TABLET | Refills: 0 | Status: SHIPPED | OUTPATIENT
Start: 2023-01-31 | End: 2023-02-07

## 2023-01-31 RX ORDER — HYDROMORPHONE HCL/PF 1 MG/ML
0.5 SYRINGE (ML) INJECTION ONCE
Status: COMPLETED | OUTPATIENT
Start: 2023-01-31 | End: 2023-01-31

## 2023-01-31 RX ADMIN — HYDROMORPHONE HYDROCHLORIDE 0.5 MG: 1 INJECTION, SOLUTION INTRAMUSCULAR; INTRAVENOUS; SUBCUTANEOUS at 15:29

## 2023-01-31 NOTE — ED NOTES
Knee immobilizer in place pt alert and oriented discharged to home in no acute distress     Guero Bain RN  01/31/23 7527

## 2023-01-31 NOTE — ED PROVIDER NOTES
History  Chief Complaint   Patient presents with   • Fall     Pt fell down basement stairs Friday landing on R leg; pt notes "bruise" on R thigh & R knee pain; +HS, - LOC, pt takes plavix daily     HPI patient is a 76-year-old male, he reports Friday he was throwing away a [facility] bucket and apparently fell down his basement stairs  Patient reports the doorway is opened and he slipped and fell backwards down the steps twisting his right knee  Reports he banged the back of his head but there was no loss of consciousness  He reports some soreness due to the fall but everything else seem to be fine  He reports pain in his right knee primarily behind the patella  He reports unable to bear weight on the knee  He reports he has a wheelchair at home he basically has been rolling himself around in the wheelchair to a get anywhere in his house  Ports he was able to get here to the emergency department by going down the steps on 1 leg  Patient reports history of an amputation of his right foot due to an open toe fracture  He reports a stent in his right leg secondary to that  He reports taking Plavix  History of insulin-dependent diabetes, right leg stent, chronic kidney disease, hypertension hyperlipidemia  Family history noncontributory n  Social history non-smoker no ill contacts    Prior to Admission Medications   Prescriptions Last Dose Informant Patient Reported? Taking?    BD PEN NEEDLE DEV U/F 32G X 4 MM MISC   No No   Sig: Inject under the skin 4 (four) times a day   Blood Glucose Monitoring Suppl (ONE TOUCH ULTRA 2) w/Device KIT   No No   Sig: by Does not apply route 2 (two) times a day   Insulin Glargine Solostar (Lantus SoloStar) 100 UNIT/ML SOPN   No No   Sig: Inject 45 units bid   aspirin 81 mg chewable tablet   No No   Sig: Chew 1 tablet (81 mg total) daily   atorvastatin (LIPITOR) 40 mg tablet   No No   Sig: Take 1 tablet (40 mg total) by mouth daily with dinner   clopidogrel (PLAVIX) 75 mg tablet   No No   Sig: Take 1 tablet (75 mg total) by mouth daily   glucose blood (OneTouch Verio) test strip   No No   Sig: Check blood sugars twice daily  Please substitute with appropriate alternative as covered by patient's insurance   Dx: E11 65   insulin glulisine (Apidra SoloStar) 100 units/mL injection pen   No No   Sig: INJECT 20 UNITS UNDER THE SKIN THREE TIMES A DAY WITH MEALS   insulin glulisine (Apidra) 100 units/mL injection   No No   Sig: Inject 30 units tid with meals   irbesartan-hydrochlorothiazide (AVALIDE) 150-12 5 MG per tablet   No No   Sig: Take 1 tablet by mouth daily   levothyroxine 150 mcg tablet   No No   Sig: Take 1 tablet (150 mcg total) by mouth daily   naltrexone (REVIA) 50 mg tablet   No No   Sig: Take 1 tablet (50 mg total) by mouth daily   omeprazole (PriLOSEC) 40 MG capsule   No No   Sig: TAKE 1 CAPSULE DAILY   verapamil (VERELAN PM) 360 MG 24 hr capsule   No No   Sig: TAKE 1 CAPSULE DAILY AT BEDTIME   zolpidem (AMBIEN) 10 mg tablet   No No   Sig: Take 1 tablet (10 mg total) by mouth daily at bedtime as needed for sleep      Facility-Administered Medications: None       Past Medical History:   Diagnosis Date   • Chronic kidney disease    • Colon polyp    • Diabetes mellitus ([facility] [address] )    • Hyperlipidemia    • Hypertension    • Thyroid cancer [facility])        Past Surgical History:   Procedure Laterality Date   • COLONOSCOPY     • FOOT SURGERY Right 2014   • IR AORTAGRAM WITH RUN-OFF  8/6/2020   • IR TUNNELED CENTRAL LINE PLACEMENT  9/8/2020   • IR TUNNELED CENTRAL LINE REMOVAL  9/28/2020   • CO AMPUTATION FOOT TRANSMETARSAL Right 9/3/2020    Procedure: AMPUTATION TRANSMETATARSAL (TMA);  Surgeon: Colten Bellamy DPM;  Location: [facility];  Service: Podiatry   • CO THYROIDECTOMY TOTAL/COMPLETE N/A 2/3/2021    Procedure: TOTAL THYROIDECTOMY WITH NIMS MONITORING;  Surgeon: Ashlee Parada MD;  Location:  MAIN OR;  Service: ENT   • TOE AMPUTATION Right 8/11/2020    Procedure: AMPUTATION TOE;  Surgeon: Bertha Mustafa DPM;  Location: [facility] OR;  Service: Podiatry   • US GUIDED THYROID BIOPSY  7/2/2020       Family History   Problem Relation Age of Onset   • Cancer Mother    • Hypertension Father      I have reviewed and agree with the history as documented  E-Cigarette/Vaping   • E-Cigarette Use Never User      E-Cigarette/Vaping Substances   • Nicotine No    • THC No    • CBD No    • Flavoring No    • Other No    • Unknown No      Social History     Tobacco Use   • Smoking status: Never   • Smokeless tobacco: Never   Vaping Use   • Vaping Use: Never used   Substance Use Topics   • Alcohol use: Not Currently     Alcohol/week: 8 0 standard drinks     Types: 8 Cans of beer per week     Comment: last drink was 10 days ago   • Drug use: Yes     Types: Marijuana     Comment: occassionally       Review of Systems   Constitutional: Negative for diaphoresis, fatigue and fever  HENT: Negative for congestion, ear pain, nosebleeds and sore throat  Eyes: Negative for photophobia, pain, discharge and visual disturbance  Respiratory: Negative for cough, choking, chest tightness, shortness of breath and wheezing  Cardiovascular: Negative for chest pain and palpitations  Gastrointestinal: Negative for abdominal distention, abdominal pain, diarrhea and vomiting  Genitourinary: Negative for dysuria, flank pain and frequency  Musculoskeletal: Positive for gait problem  Negative for back pain and joint swelling  Skin: Negative for color change and rash  Neurological: Negative for dizziness, syncope and headaches  Psychiatric/Behavioral: Negative for behavioral problems and confusion  The patient is not nervous/anxious  All other systems reviewed and are negative  Reports right knee pain    Physical Exam  Physical Exam  Vitals and nursing note reviewed  Constitutional:       Appearance: He is well-developed  HENT:      Head: Normocephalic        Right Ear: External ear normal       Left Ear: External ear normal       Nose: Nose normal    Eyes:      General: Lids are normal       Pupils: Pupils are equal, round, and reactive to light  Neck:      Comments: No cervical tenderness cleared by Nexus criteria  Cardiovascular:      Rate and Rhythm: Normal rate and regular rhythm  Pulses: Normal pulses  Heart sounds: Normal heart sounds  Pulmonary:      Effort: Pulmonary effort is normal  No respiratory distress  Abdominal:      General: Abdomen is flat  Bowel sounds are normal       Tenderness: There is no abdominal tenderness  Musculoskeletal:         General: No deformity  Cervical back: Normal range of motion and neck supple  Comments: There is some swelling of the right knee, no redness no warmth, there is decreased range of motion secondary to pain, patella tendon mechanism is intact patient can lift the leg off the bed  Good distal pulses and sensation there is a partial foot amputation patient reports secondary to an infected great toe  Pain with any range of motion of the knee, good distal capillary refill  Skin:     General: Skin is warm and dry  Neurological:      General: No focal deficit present  Mental Status: He is alert and oriented to person, place, and time  Cranial Nerves: No cranial nerve deficit     Psychiatric:         Mood and Affect: Mood normal          Vital Signs  ED Triage Vitals [01/31/23 1318]   Temperature Pulse Respirations Blood Pressure SpO2   (!) 97 3 °F (36 3 °C) 80 17 102/74 98 %      Temp Source Heart Rate Source Patient Position - Orthostatic VS BP Location FiO2 (%)   Oral Monitor Sitting Right arm --      Pain Score       10 - Worst Possible Pain           Vitals:    01/31/23 1318 01/31/23 1551   BP: 102/74 95/59   Pulse: 80 73   Patient Position - Orthostatic VS: Sitting Lying         Visual Acuity  Visual Acuity    Flowsheet Row Most Recent Value   L Pupil Size (mm) 3   R Pupil Size (mm) 3          ED Medications  Medications HYDROmorphone (DILAUDID) injection 0 5 mg (0 5 mg Intramuscular Given 1/31/23 1529)       Diagnostic Studies  Results Reviewed     Procedure Component Value Units Date/Time    Comprehensive metabolic panel [[id number]]  (Abnormal) Collected: 01/31/23 1536    Lab Status: Final result Specimen: Blood from Heel, Right Updated: 01/31/23 1604     Sodium 131 mmol/L      Potassium 4 1 mmol/L      Chloride 93 mmol/L      CO2 24 mmol/L      ANION GAP 14 mmol/L      BUN 36 mg/dL      Creatinine 2 00 mg/dL      Glucose 334 mg/dL      Calcium 9 0 mg/dL      Corrected Calcium 9 5 mg/dL      AST 29 U/L      ALT 36 U/L      Alkaline Phosphatase 72 U/L      Total Protein 7 9 g/dL      Albumin 3 4 g/dL      Total Bilirubin 1 30 mg/dL      eGFR 35 ml/min/1 73sq m     Narrative:      National Kidney Disease Foundation guidelines for Chronic Kidney Disease (CKD):   •  Stage 1 with normal or high GFR (GFR > 90 mL/min/1 73 square meters)  •  Stage 2 Mild CKD (GFR = 60-89 mL/min/1 73 square meters)  •  Stage 3A Moderate CKD (GFR = 45-59 mL/min/1 73 square meters)  •  Stage 3B Moderate CKD (GFR = 30-44 mL/min/1 73 square meters)  •  Stage 4 Severe CKD (GFR = 15-29 mL/min/1 73 square meters)  •  Stage 5 End Stage CKD (GFR <15 mL/min/1 73 square meters)  Note: GFR calculation is accurate only with a steady state creatinine    CBC and differential [[id number]]  (Abnormal) Collected: 01/31/23 1536    Lab Status: Final result Specimen: Blood from Hand, Right Updated: 01/31/23 1544     WBC 11 18 Thousand/uL      RBC 3 89 Million/uL      Hemoglobin 12 7 g/dL      Hematocrit 37 1 %      MCV 95 fL      MCH 32 6 pg      MCHC 34 2 g/dL      RDW 12 3 %      MPV 10 7 fL      Platelets 882 Thousands/uL      nRBC 0 /100 WBCs      Neutrophils Relative 74 %      Immat GRANS % 1 %      Lymphocytes Relative 12 %      Monocytes Relative 13 %      Eosinophils Relative 0 %      Basophils Relative 0 %      Neutrophils Absolute 8 13 Thousands/µL      Immature Grans Absolute 0 12 Thousand/uL      Lymphocytes Absolute 1 33 Thousands/µL      Monocytes Absolute 1 50 Thousand/µL      Eosinophils Absolute 0 05 Thousand/µL      Basophils Absolute 0 05 Thousands/µL                  XR knee 4+ views RIGHT    (Results Pending)   CT lower extremity wo contrast right    (Results Pending)              Procedures  Procedures         ED Course             X-ray of the right knee shows a tibial plateau fracture  Interpreted by me appositional   Spoke with orthopedics on-call Dr Rubin, advised CT for operative management, no indication for emergent intervention today  Follow-up with Dr  Kp Faith  -I drew laboratory testing for preop    Laboratory testing showed renal insufficiency which is known in the patient, history of diabetes but no diabetic ketoacidosis  White count was minimally elevated 11 1, nonspecific finding hemoglobin was normal at 12                      Medical Decision Making  Medical decision making 75-year-old male presents emergency department with right knee pain after a fall  Physical exam shows no other injury other than the right knee  X-ray showed a tibial plateau fracture  Case was discussed with orthopedics, patient is complicated by a previous amputation on that right side with a stent  Complicated by his diabetes and renal insufficiency  Discussed with the patient will follow-up with Dr Kp Augustin as an outpatient with a knee immobilizer possible nonoperative versus operative management  Discussed outpatient treatment and follow-up discussed analgesics  Patient reports he cannot take oxycodone he takes oral morphine if needed  Patient was treated with Dilaudid here and oral morphine at home  Discussed risk-benefit discussed follow-up discussed indications to return  Tibial plateau fracture, right: acute illness or injury  Amount and/or Complexity of Data Reviewed  Labs: ordered  Radiology: ordered        Risk  Prescription drug management  Disposition  Final diagnoses:   Tibial plateau fracture, right     Time reflects when diagnosis was documented in both MDM as applicable and the Disposition within this note     Time User Action Codes Description Comment    1/31/2023  3:12 PM Kat Parikh Add [D23 367O] Tibial plateau fracture, right       ED Disposition     ED Disposition   Discharge    Condition   Stable    Date/Time   Tue Jan 31, 2023  3:12 PM    Comment   Donyaannika Simmons discharge to home/self care  Follow-up Information     Follow up With Specialties Details Why Contact Info    Joseline Adame MD Orthopedic Surgery   [address] [place] [address] [id number] [phone]            Patient's Medications   Discharge Prescriptions    MORPHINE (MSIR) 15 MG TABLET    Take 1 tablet (15 mg total) by mouth every 6 (six) hours as needed for severe pain for up to 7 days Max Daily Amount: 60 mg       Start Date: 1/31/2023 End Date: 2/7/2023       Order Dose: 15 mg       Quantity: 28 tablet    Refills: 0       No discharge procedures on file      PDMP Review     None          ED Provider  Electronically Signed by           Shabana Roy MD  01/31/23 2017

## 2023-01-31 NOTE — DISCHARGE INSTRUCTIONS
Use the knee immobilizer  Ice and elevation  Morphine 1 every 6 hours if needed for pain caution narcotic will make you sleepy  Follow-up with Dr Shaneka Swanson for appointment

## 2023-02-06 DIAGNOSIS — I10 HYPERTENSION, ESSENTIAL: ICD-10-CM

## 2023-02-06 RX ORDER — IRBESARTAN AND HYDROCHLOROTHIAZIDE 150; 12.5 MG/1; MG/1
TABLET, FILM COATED ORAL
Qty: 90 TABLET | Refills: 3 | Status: SHIPPED | OUTPATIENT
Start: 2023-02-06

## 2023-02-07 ENCOUNTER — OFFICE VISIT (OUTPATIENT)
Dept: OBGYN CLINIC | Facility: CLINIC | Age: 58
End: 2023-02-07

## 2023-02-07 VITALS — BODY MASS INDEX: 34.58 KG/M2 | WEIGHT: 284 LBS | HEIGHT: 76 IN

## 2023-02-07 DIAGNOSIS — S82.141A CLOSED FRACTURE OF RIGHT TIBIAL PLATEAU, INITIAL ENCOUNTER: Primary | ICD-10-CM

## 2023-02-07 RX ORDER — POLYETHYLENE GLYCOL 3350 17 G/17G
17 POWDER, FOR SOLUTION ORAL DAILY
Qty: 14 EACH | Refills: 0 | Status: SHIPPED | OUTPATIENT
Start: 2023-02-07

## 2023-02-07 RX ORDER — SENNA PLUS 8.6 MG/1
1 TABLET ORAL DAILY
Qty: 30 TABLET | Refills: 0 | Status: SHIPPED | OUTPATIENT
Start: 2023-02-07

## 2023-02-07 RX ORDER — DOCUSATE SODIUM 100 MG/1
100 CAPSULE, LIQUID FILLED ORAL 2 TIMES DAILY
Qty: 30 CAPSULE | Refills: 0 | Status: SHIPPED | OUTPATIENT
Start: 2023-02-07

## 2023-02-07 NOTE — PATIENT INSTRUCTIONS
Increase Asprin to twice per day   TROM knee brace provided to the patient today  Brace should be worn at all times  passive range of motion only  Remain non-weight bearing at this time      Colace prescribed today   FU 2 weeks

## 2023-02-07 NOTE — PROGRESS NOTES
Orthopaedics Office Visit - inital Patient Visit    ASSESSMENT/PLAN:    Assessment:   Right knee comminuted tibial plateau fracture with varus alignment of lower extremity  A1C from 11/14/22 11 1     Plan:   · Reviewed that treatment at this time includes operative or non-operative treatment  Risks and benefits of both reviewed with the patient  Reviewed that recovery will take approximately 8 weeks  Patient has elected to pursue with non-operative treatment at this time  Very high likelihood for severe arthritis of extremity, however in setting of medical comorbidities, patient's poor history of healing after surgeries, PAD, elect to pursue nonop therapy    · Increase Asprin to twice per day , patient also takes plavix  · TROM knee brace provided to the patient today  Brace should be worn at all times  Passive range of motion only  Remain non-weight bearing at this time  · Colace prescribed today   · FU 2 weeks    To Do Next Visit:  Re-evaluation/alignment check   XR of right knee     _____________________________________________________  CHIEF COMPLAINT:  Chief Complaint   Patient presents with   • Right Knee - Pain         SUBJECTIVE:  Julian Betancourt is a 59 y o  male who presents with right knee pain  Patient slipped and fell backward down his basement stairs on 1/27/23, twisting his right knee  He went to the ED on 1/31/23 XR taken in the ED diagnosed him with a right knee tibial plateau fracture  He has been taking morphine prescribed by the ED  Patient has been NWB since the injury  He presents to the office today in a wheel chair  Pain well localized to the right knee without radiation proximally or distally  Pain exacerbated by motion and relieved by rest  Denies pain elsewhere   Has been mostly bedrest secondary to NWB status    PAST MEDICAL HISTORY:  Past Medical History:   Diagnosis Date   • Chronic kidney disease    • Colon polyp    • Diabetes mellitus ([facility] )    • Hyperlipidemia    • Hypertension • Thyroid cancer ([facility] )        PAST SURGICAL HISTORY:  Past Surgical History:   Procedure Laterality Date   • COLONOSCOPY     • FOOT SURGERY Right 2014   • IR AORTAGRAM WITH RUN-OFF  8/6/2020   • IR TUNNELED CENTRAL LINE PLACEMENT  9/8/2020   • IR TUNNELED CENTRAL LINE REMOVAL  9/28/2020   • [place] AMPUTATION FOOT TRANSMETARSAL Right 9/3/2020    Procedure: AMPUTATION TRANSMETATARSAL (TMA);  Surgeon: Rodriguez Zayas DPM;  [facility];  Service: Podiatry   • NC THYROIDECTOMY TOTAL/COMPLETE N/A 2/3/2021    Procedure: TOTAL THYROIDECTOMY WITH NIMS MONITORING;  Surgeon: Michelle Chacon MD;  [facility];  Service: ENT   • TOE AMPUTATION Right 8/11/2020    Procedure: AMPUTATION TOE;  Surgeon: Rodriguez Zayas DPM;  [facility];  Service: Podiatry   • US GUIDED THYROID BIOPSY  7/2/2020       FAMILY HISTORY:  Family History   Problem Relation Age of Onset   • Cancer Mother    • Hypertension Father        SOCIAL HISTORY:  Social History     Tobacco Use   • Smoking status: Never   • Smokeless tobacco: Never   Vaping Use   • Vaping Use: Never used   Substance Use Topics   • Alcohol use: Not Currently     Alcohol/week: 8 0 standard drinks     Types: 8 Cans of beer per week     Comment: last drink was 10 days ago   • Drug use: Yes     Types: Marijuana     Comment: occassionally       MEDICATIONS:    Current Outpatient Medications:   •  BD PEN NEEDLE DEV U/F 32G X 4 MM MISC, Inject under the skin 4 (four) times a day, Disp: 400 each, Rfl: 1  •  Blood Glucose Monitoring Suppl (ONE TOUCH ULTRA 2) w/Device KIT, by Does not apply route 2 (two) times a day, Disp: 1 each, Rfl: 0  •  glucose blood (OneTouch Verio) test strip, Check blood sugars twice daily  Please substitute with appropriate alternative as covered by patient's insurance   Dx: E11 65, Disp: 200 each, Rfl: 3  •  Insulin Glargine Solostar (Lantus SoloStar) 100 UNIT/ML SOPN, Inject 45 units bid, Disp: 225 mL, Rfl: 3  •  insulin glulisine (Apidra SoloStar) 100 units/mL injection pen, INJECT 20 UNITS UNDER THE SKIN THREE TIMES A DAY WITH MEALS, Disp: 60 mL, Rfl: 0  •  insulin glulisine (Apidra) 100 units/mL injection, Inject 30 units tid with meals, Disp: 60 mL, Rfl: 0  •  irbesartan-hydrochlorothiazide (AVALIDE) 150-12 5 MG per tablet, TAKE 1 TABLET DAILY, Disp: 90 tablet, Rfl: 3  •  levothyroxine 150 mcg tablet, Take 1 tablet (150 mcg total) by mouth daily, Disp: 90 tablet, Rfl: 3  •  morphine (MSIR) 15 mg tablet, Take 1 tablet (15 mg total) by mouth every 6 (six) hours as needed for severe pain for up to 7 days Max Daily Amount: 60 mg, Disp: 28 tablet, Rfl: 0  •  naltrexone (REVIA) 50 mg tablet, Take 1 tablet (50 mg total) by mouth daily, Disp: 30 tablet, Rfl: 5  •  omeprazole (PriLOSEC) 40 MG capsule, TAKE 1 CAPSULE DAILY, Disp: 90 capsule, Rfl: 3  •  verapamil (VERELAN PM) 360 MG 24 hr capsule, TAKE 1 CAPSULE DAILY AT BEDTIME, Disp: 90 capsule, Rfl: 3  •  zolpidem (AMBIEN) 10 mg tablet, Take 1 tablet (10 mg total) by mouth daily at bedtime as needed for sleep, Disp: 30 tablet, Rfl: 3  •  aspirin 81 mg chewable tablet, Chew 1 tablet (81 mg total) daily, Disp: 30 tablet, Rfl: 0  •  atorvastatin (LIPITOR) 40 mg tablet, Take 1 tablet (40 mg total) by mouth daily with dinner, Disp: 90 tablet, Rfl: 3  •  clopidogrel (PLAVIX) 75 mg tablet, Take 1 tablet (75 mg total) by mouth daily, Disp: 90 tablet, Rfl: 3    ALLERGIES:  Allergies   Allergen Reactions   • Pollen Extract Eye Swelling     Eyes get watery        REVIEW OF SYSTEMS:  MSK: see HPI  Neuro: WNL  Pertinent items are otherwise noted in HPI  A comprehensive review of systems was otherwise negative      LABS:  HgA1c:   Lab Results   Component Value Date    HGBA1C 11 1 (H) 11/14/2022     BMP:   Lab Results   Component Value Date    CALCIUM 9 0 01/31/2023    K 4 1 01/31/2023    CO2 24 01/31/2023    CL 93 (L) 01/31/2023    BUN 36 (H) 01/31/2023    CREATININE 2 00 (H) 01/31/2023     CBC: No components found for: CBC    _____________________________________________________  PHYSICAL EXAMINATION:  Vital signs: Ht 6' 4" (1 93 m)   Wt 129 kg (284 lb)   BMI 34 57 kg/m²   General: No acute distress, awake and alert  Psychiatric: Mood and affect appear appropriate  HEENT: Trachea Midline, No torticollis, no apparent facial trauma  Cardiovascular: No audible murmurs; Extremities appear perfused  Pulmonary: No audible wheezing or stridor  Skin: No open lesions; see further details (if any) below    MUSCULOSKELETAL EXAMINATION:  Extremities:  Right knee   · Varus alignment   · ROM 10-90 degrees   · Mild Ecchymosis and edema present   · No erythema  · NVI distally   _____________________________________________________  STUDIES REVIEWED:  I personally reviewed the images and interpretation is as follows:    XR of the right knee taken 1/31/23 demonstrates comminuted intra-articular tibial plateau fracture  CT scan of the right knee taken 1/31/23 demonstrates tibial plateau fracture involving the central lateral tibial plateau, medial aspect of the metadiaphyseal junction and comminuted fracture through the posterior aspect of the tibial eminence       PROCEDURES PERFORMED:  Procedures     None     Scribe Attestation    I,:  Ji Alvarado am acting as a scribe while in the presence of the attending physician :       I,:  Beverly Moon MD personally performed the services described in this documentation    as scribed in my presence :

## 2023-02-13 ENCOUNTER — TELEPHONE (OUTPATIENT)
Dept: NEPHROLOGY | Facility: CLINIC | Age: 58
End: 2023-02-13

## 2023-02-13 DIAGNOSIS — G47.00 INSOMNIA, UNSPECIFIED TYPE: ICD-10-CM

## 2023-02-13 RX ORDER — ZOLPIDEM TARTRATE 10 MG/1
10 TABLET ORAL
Qty: 30 TABLET | Refills: 3 | Status: SHIPPED | OUTPATIENT
Start: 2023-02-13

## 2023-02-14 ENCOUNTER — TELEMEDICINE (OUTPATIENT)
Dept: NEPHROLOGY | Facility: CLINIC | Age: 58
End: 2023-02-14

## 2023-02-14 VITALS
TEMPERATURE: 97.5 F | WEIGHT: 280 LBS | DIASTOLIC BLOOD PRESSURE: 60 MMHG | SYSTOLIC BLOOD PRESSURE: 100 MMHG | RESPIRATION RATE: 16 BRPM | BODY MASS INDEX: 34.1 KG/M2 | HEIGHT: 76 IN

## 2023-02-14 DIAGNOSIS — R80.1 PERSISTENT PROTEINURIA: ICD-10-CM

## 2023-02-14 DIAGNOSIS — E87.5 HYPERKALEMIA: ICD-10-CM

## 2023-02-14 DIAGNOSIS — I10 HYPERTENSION, ESSENTIAL: ICD-10-CM

## 2023-02-14 DIAGNOSIS — E11.42 TYPE 2 DIABETES MELLITUS WITH DIABETIC POLYNEUROPATHY, WITH LONG-TERM CURRENT USE OF INSULIN (HCC): Primary | ICD-10-CM

## 2023-02-14 DIAGNOSIS — E83.9 CHRONIC KIDNEY DISEASE-MINERAL AND BONE DISORDER: ICD-10-CM

## 2023-02-14 DIAGNOSIS — N18.32 STAGE 3B CHRONIC KIDNEY DISEASE (HCC): ICD-10-CM

## 2023-02-14 DIAGNOSIS — N18.9 CHRONIC KIDNEY DISEASE-MINERAL AND BONE DISORDER: ICD-10-CM

## 2023-02-14 DIAGNOSIS — Z79.4 TYPE 2 DIABETES MELLITUS WITH DIABETIC POLYNEUROPATHY, WITH LONG-TERM CURRENT USE OF INSULIN (HCC): Primary | ICD-10-CM

## 2023-02-14 DIAGNOSIS — M89.9 CHRONIC KIDNEY DISEASE-MINERAL AND BONE DISORDER: ICD-10-CM

## 2023-02-14 DIAGNOSIS — N17.9 AKI (ACUTE KIDNEY INJURY) (HCC): ICD-10-CM

## 2023-02-14 NOTE — PROGRESS NOTES
Virtual Regular Visit    Verification of patient location:    Patient is located in the following state in which I hold an active license [place]      Assessment/Plan:    Problem List Items Addressed This Visit        Endocrine    Type 2 diabetes mellitus with diabetic polyneuropathy, with long-term current use of insulin ([facility] ) - Primary       Cardiovascular and Mediastinum    Hypertension, essential       Genitourinary    Stage 3b chronic kidney disease ([facility] )    Relevant Orders    Basic metabolic panel    Phosphorus    CBC and differential    Protein / creatinine ratio, urine    PTH, intact    UA (URINE) with reflex to Scope    Vitamin D 25 hydroxy    MARKELL (acute kidney injury) ([facility] [address] )    Relevant Orders    Basic metabolic panel    CBC and differential    Chloride, urine, random    Creatinine, urine, random    Osmolality, urine    Uric acid    Sodium, urine, random    UA (URINE) with reflex to Scope    Chronic kidney disease-mineral and bone disorder    Relevant Orders    Basic metabolic panel    Phosphorus    CBC and differential    Protein / creatinine ratio, urine    PTH, intact    UA (URINE) with reflex to Scope    Vitamin D 25 hydroxy       Other    Persistent proteinuria    Hyperkalemia            Reason for visit is   Chief Complaint   Patient presents with   • Chronic Kidney Disease   • Follow-up   • Virtual Regular Visit        Encounter provider Anders Castro MD    Provider located at [address] [facility] [address] [place] [id number] [address]  [phone]      Recent Visits  Date Type Provider Dept   02/13/23 Telephone [facility] recent visits within past 7 days and meeting all other requirements  Today's Visits  Date Type Provider Dept   02/14/23 Aung Weber MD [facility] [address] today's visits and meeting all other requirements  Future Appointments  No visits were found meeting these conditions  Showing future appointments within next 150 days and meeting all other requirements       The patient was identified by name and date of birth  Kristy Mckinley was informed that this is a telemedicine visit and that the visit is being conducted through the [employer]  He agrees to proceed     My office door was closed  No one else was in the room  He acknowledged consent and understanding of privacy and security of the video platform  The patient has agreed to participate and understands they can discontinue the visit at any time  Patient is aware this is a billable service  Subjective  Kristy Mckinley is a 59 y o  male acute on chronic renal failure  Patient with history of acute kidney injury in the past who did not come back for the follow-up but came back again because of acute kidney injury    Patient was seen by me almost 2 years ago  Recently she had a fall at home with injury  Thus we could not come to see me and this is a video visit    He is overall doing well  Still gaining quite a bit of weight    Denies any acute complaint except feeling of tiredness and pain    Denies taking nonsteroidal painkiller    No chest pain or palpitation or shortness of breath    Denies any urinary complaint       Past Medical History:   Diagnosis Date   • Broken internal right knee prosthesis ([facility] [address] ) 01/2023   • Chronic kidney disease    • Colon polyp    • Diabetes mellitus ([facility] [address] [address] )    • Hyperlipidemia    • Hypertension    • Thyroid cancer [facility])        Past Surgical History:   Procedure Laterality Date   • COLONOSCOPY     • FOOT AMPUTATION Right    • FOOT SURGERY Right 2014   • IR AORTAGRAM WITH RUN-OFF  08/06/2020   • IR TUNNELED CENTRAL LINE PLACEMENT  09/08/2020   • IR TUNNELED CENTRAL LINE REMOVAL  09/28/2020   • [place] AMPUTATION FOOT TRANSMETARSAL Right 09/03/2020    Procedure: AMPUTATION TRANSMETATARSAL (TMA);  Surgeon: Rohith Olmstead DPM;  [facility];  Service: Podiatry   • DC THYROIDECTOMY TOTAL/COMPLETE N/A 02/03/2021    Procedure: TOTAL THYROIDECTOMY WITH NIMS MONITORING;  Surgeon: Josue Chen MD;  Location: [facility];  Service: ENT   • TOE AMPUTATION Right 08/11/2020    Procedure: AMPUTATION TOE;  Surgeon: Kayla Garduno DPM;  [facility]: [facility];  Service: Podiatry   • US GUIDED THYROID BIOPSY  07/02/2020       Current Outpatient Medications   Medication Sig Dispense Refill   • aspirin 81 mg chewable tablet Chew 1 tablet (81 mg total) daily 30 tablet 0   • atorvastatin (LIPITOR) 40 mg tablet Take 1 tablet (40 mg total) by mouth daily with dinner 90 tablet 3   • BD PEN NEEDLE DEV U/F 32G X 4 MM MISC Inject under the skin 4 (four) times a day 400 each 1   • Blood Glucose Monitoring Suppl (ONE TOUCH ULTRA 2) w/Device KIT by Does not apply route 2 (two) times a day 1 each 0   • clopidogrel (PLAVIX) 75 mg tablet Take 1 tablet (75 mg total) by mouth daily 90 tablet 3   • docusate sodium (COLACE) 100 mg capsule Take 1 capsule (100 mg total) by mouth 2 (two) times a day 30 capsule 0   • glucose blood (OneTouch Verio) test strip Check blood sugars twice daily  Please substitute with appropriate alternative as covered by patient's insurance   Dx: E11 65 200 each 3   • Insulin Glargine Solostar (Lantus SoloStar) 100 UNIT/ML SOPN Inject 45 units bid 225 mL 3   • insulin glulisine (Apidra) 100 units/mL injection Inject 30 units tid with meals 60 mL 0   • irbesartan-hydrochlorothiazide (AVALIDE) 150-12 5 MG per tablet TAKE 1 TABLET DAILY 90 tablet 3   • levothyroxine 150 mcg tablet Take 1 tablet (150 mcg total) by mouth daily 90 tablet 3   • naltrexone (REVIA) 50 mg tablet Take 1 tablet (50 mg total) by mouth daily 30 tablet 5   • omeprazole (PriLOSEC) 40 MG capsule TAKE 1 CAPSULE DAILY 90 capsule 3   • polyethylene glycol (MIRALAX) 17 g packet Take 17 g by mouth daily 14 each 0   • senna (SENOKOT) 8 6 MG tablet Take 1 tablet (8 6 mg total) by mouth daily 30 tablet 0   • verapamil (VERELAN PM) 360 MG 24 hr capsule TAKE 1 CAPSULE DAILY AT BEDTIME 90 capsule 3   • zolpidem (AMBIEN) 10 mg tablet Take 1 tablet (10 mg total) by mouth daily at bedtime as needed for sleep 30 tablet 3     No current facility-administered medications for this visit  Allergies   Allergen Reactions   • Pollen Extract Eye Swelling     Eyes get watery        Review of Systems   Constitutional: Negative for activity change and fatigue  HENT: Negative for congestion and ear discharge  Eyes: Negative for photophobia and pain  Respiratory: Negative for apnea and choking  Cardiovascular: Negative for chest pain and palpitations  Gastrointestinal: Negative for abdominal distention and blood in stool  Endocrine: Negative for heat intolerance and polyphagia  Genitourinary: Negative for flank pain and urgency  Musculoskeletal: Positive for arthralgias and back pain  Negative for neck pain and neck stiffness  Skin: Negative for color change and wound  Allergic/Immunologic: Negative for food allergies and immunocompromised state  Neurological: Positive for weakness  Negative for seizures and facial asymmetry  Hematological: Negative for adenopathy  Does not bruise/bleed easily  Psychiatric/Behavioral: Negative for self-injury and suicidal ideas  Video Exam    Vitals:    02/14/23 1050   BP: 100/60   Resp: 16   Temp: 97 5 °F (36 4 °C)   TempSrc: Oral   Weight: 127 kg (280 lb)   Height: 6' 4" (1 93 m)       Physical Exam  Constitutional:       General: He is not in acute distress  Appearance: He is well-developed  He is obese  HENT:      Head: Normocephalic  Eyes:      General: No scleral icterus  Conjunctiva/sclera: Conjunctivae normal    Neck:      Vascular: No JVD  Cardiovascular:      Rate and Rhythm: Normal rate  Heart sounds: Normal heart sounds  Pulmonary:      Effort: Pulmonary effort is normal       Breath sounds: No wheezing  Abdominal:      General: Abdomen is flat  Musculoskeletal:         General: Normal range of motion  Cervical back: Normal range of motion and neck supple  Skin:     Coloration: Skin is not jaundiced or pale  Findings: No rash  Neurological:      Mental Status: He is alert and oriented to person, place, and time  Psychiatric:         Behavior: Behavior normal      Acute kidney injury: It was unclear  Possible volume depletion possible related to painkiller intake  At this point advise hydration and I will repeat blood and urine test   We will also get kidney ultrasound  We will get a urinary test to assess volume status    CKD stage III: Possibly has stage III CKD  We will recheck the blood and urine test    Hypertension: Blood pressure running on lower side by the emergency room visit  Advised to recheck the blood pressure and if it is running low we may need to change statin medication as patient is on angiotensin receptor blocker which can cause acute kidney injury with hypotension    Proteinuria: May be related to CKD  Will monitor    Everything discussed at length with the patient  We will recheck the blood and urine test in 1 to 2 weeks    We also get further blood test in 3 months and I will see him back in 3 months    I spent 25 minutes with patient today in which greater than 50% of the time was spent in counseling/coordination of care regarding CKD

## 2023-02-16 DIAGNOSIS — S82.141A CLOSED FRACTURE OF RIGHT TIBIAL PLATEAU, INITIAL ENCOUNTER: Primary | ICD-10-CM

## 2023-02-21 ENCOUNTER — APPOINTMENT (OUTPATIENT)
Dept: RADIOLOGY | Facility: CLINIC | Age: 58
End: 2023-02-21

## 2023-02-21 ENCOUNTER — OFFICE VISIT (OUTPATIENT)
Dept: OBGYN CLINIC | Facility: CLINIC | Age: 58
End: 2023-02-21

## 2023-02-21 VITALS
HEIGHT: 76 IN | SYSTOLIC BLOOD PRESSURE: 131 MMHG | HEART RATE: 62 BPM | BODY MASS INDEX: 34.1 KG/M2 | DIASTOLIC BLOOD PRESSURE: 78 MMHG | WEIGHT: 280 LBS

## 2023-02-21 DIAGNOSIS — S82.141D CLOSED FRACTURE OF RIGHT TIBIAL PLATEAU WITH ROUTINE HEALING: Primary | ICD-10-CM

## 2023-02-21 DIAGNOSIS — M21.161 ACQUIRED GENU VARUM OF RIGHT LOWER EXTREMITY: ICD-10-CM

## 2023-02-21 DIAGNOSIS — S82.141A CLOSED FRACTURE OF RIGHT TIBIAL PLATEAU, INITIAL ENCOUNTER: ICD-10-CM

## 2023-02-21 RX ORDER — TRAMADOL HYDROCHLORIDE 50 MG/1
50 TABLET ORAL EVERY 6 HOURS PRN
Qty: 45 TABLET | Refills: 0 | Status: ON HOLD | OUTPATIENT
Start: 2023-02-21

## 2023-02-21 NOTE — PROGRESS NOTES
Orthopaedics Office Visit - Established Patient Visit    ASSESSMENT/PLAN:    Assessment:   Right knee comminuted tibial plateau fracture sustained on 1/27/23  varus alignment of lower extremity    Plan:   · The patient's x-rays were reviewed today  · The patient was told to follow-up with his PCP in regard to pain medications as his Nephrologist does not want him to take ibuprofen  · A one time Tramadol prescription was provided until he can discuss pain management with PCP, may also need chronic pain doctor  · A medial  brace was provided to correct his varus alignment   · Follow-up in 3 weeks for re-evaluation     To Do Next Visit:  Re-evaluation     _____________________________________________________  CHIEF COMPLAINT:  Chief Complaint   Patient presents with   • Right Knee - Follow-up         SUBJECTIVE:  Renetta Cabrera is a 59 y o  male who presents Right knee comminuted tibial plateau fracture sustained on 1/27/23 status post fall down stairs, varus alignment of lower extremity  The patient was last seen in the office on 2/7/23 where it was decided we should move forward with conservative treatment, increase aspirin to twice per day with Plavix, TROM brace provided, colace provided  Today the patient reports he is in a lot of pain  He is in a wheelchair today  Pain is described as sharp and is a 10/10        PAST MEDICAL HISTORY:  Past Medical History:   Diagnosis Date   • Broken internal right knee prosthesis ([facility] ) 01/2023   • Chronic kidney disease    • Colon polyp    • Diabetes mellitus ([facility] )    • Hyperlipidemia    • Hypertension    • Thyroid cancer ([facility] )        PAST SURGICAL HISTORY:  Past Surgical History:   Procedure Laterality Date   • COLONOSCOPY     • FOOT AMPUTATION Right    • FOOT SURGERY Right 2014   • IR AORTAGRAM WITH RUN-OFF  08/06/2020   • IR TUNNELED CENTRAL LINE PLACEMENT  09/08/2020   • IR TUNNELED CENTRAL LINE REMOVAL  09/28/2020   • [place] AMPUTATION FOOT TRANSMETARSAL Right 09/03/2020    Procedure: AMPUTATION TRANSMETATARSAL (TMA);  Surgeon: Cuh Freeman DPM;  [facility];  Service: Podiatry   • [place] THYROIDECTOMY TOTAL/COMPLETE N/A 02/03/2021    Procedure: TOTAL THYROIDECTOMY WITH NIMS MONITORING;  Surgeon: Celia Jackson MD;  [facility];  Service: ENT   • TOE AMPUTATION Right 08/11/2020    Procedure: AMPUTATION TOE;  Surgeon: Chu Freeman DPM;  [facility];  Service: Podiatry   • US GUIDED THYROID BIOPSY  07/02/2020       FAMILY HISTORY:  Family History   Problem Relation Age of Onset   • Cancer Mother    • Hypertension Father        SOCIAL HISTORY:  Social History     Tobacco Use   • Smoking status: Never   • Smokeless tobacco: Never   Vaping Use   • Vaping Use: Never used   Substance Use Topics   • Alcohol use: Not Currently     Alcohol/week: 8 0 standard drinks     Types: 8 Cans of beer per week     Comment: last drink was 10 days ago   • Drug use: Yes     Types: Marijuana     Comment: occassionally       MEDICATIONS:    Current Outpatient Medications:   •  BD PEN NEEDLE DEV U/F 32G X 4 MM MISC, Inject under the skin 4 (four) times a day, Disp: 400 each, Rfl: 1  •  Blood Glucose Monitoring Suppl (ONE TOUCH ULTRA 2) w/Device KIT, by Does not apply route 2 (two) times a day, Disp: 1 each, Rfl: 0  •  docusate sodium (COLACE) 100 mg capsule, Take 1 capsule (100 mg total) by mouth 2 (two) times a day, Disp: 30 capsule, Rfl: 0  •  glucose blood (OneTouch Verio) test strip, Check blood sugars twice daily  Please substitute with appropriate alternative as covered by patient's insurance   Dx: E11 65, Disp: 200 each, Rfl: 3  •  Insulin Glargine Solostar (Lantus SoloStar) 100 UNIT/ML SOPN, Inject 45 units bid, Disp: 225 mL, Rfl: 3  •  insulin glulisine (Apidra) 100 units/mL injection, Inject 30 units tid with meals, Disp: 60 mL, Rfl: 0  •  irbesartan-hydrochlorothiazide (AVALIDE) 150-12 5 MG per tablet, TAKE 1 TABLET DAILY, Disp: 90 tablet, Rfl: 3  • levothyroxine 150 mcg tablet, Take 1 tablet (150 mcg total) by mouth daily, Disp: 90 tablet, Rfl: 3  •  naltrexone (REVIA) 50 mg tablet, Take 1 tablet (50 mg total) by mouth daily, Disp: 30 tablet, Rfl: 5  •  omeprazole (PriLOSEC) 40 MG capsule, TAKE 1 CAPSULE DAILY, Disp: 90 capsule, Rfl: 3  •  polyethylene glycol (MIRALAX) 17 g packet, Take 17 g by mouth daily, Disp: 14 each, Rfl: 0  •  senna (SENOKOT) 8 6 MG tablet, Take 1 tablet (8 6 mg total) by mouth daily, Disp: 30 tablet, Rfl: 0  •  verapamil (VERELAN PM) 360 MG 24 hr capsule, TAKE 1 CAPSULE DAILY AT BEDTIME, Disp: 90 capsule, Rfl: 3  •  zolpidem (AMBIEN) 10 mg tablet, Take 1 tablet (10 mg total) by mouth daily at bedtime as needed for sleep, Disp: 30 tablet, Rfl: 3  •  aspirin 81 mg chewable tablet, Chew 1 tablet (81 mg total) daily, Disp: 30 tablet, Rfl: 0  •  atorvastatin (LIPITOR) 40 mg tablet, Take 1 tablet (40 mg total) by mouth daily with dinner, Disp: 90 tablet, Rfl: 3  •  clopidogrel (PLAVIX) 75 mg tablet, Take 1 tablet (75 mg total) by mouth daily, Disp: 90 tablet, Rfl: 3    ALLERGIES:  Allergies   Allergen Reactions   • Pollen Extract Eye Swelling     Eyes get watery        REVIEW OF SYSTEMS:  MSK: right knee  pain  Neuro: WNL  Pertinent items are otherwise noted in HPI  A comprehensive review of systems was otherwise negative      LABS:  HgA1c:   Lab Results   Component Value Date    HGBA1C 11 1 (H) 11/14/2022     BMP:   Lab Results   Component Value Date    CALCIUM 9 0 01/31/2023    K 4 1 01/31/2023    CO2 24 01/31/2023    CL 93 (L) 01/31/2023    BUN 36 (H) 01/31/2023    CREATININE 2 00 (H) 01/31/2023     CBC: No components found for: CBC    _____________________________________________________  PHYSICAL EXAMINATION:  Vital signs: /78   Pulse 62   Ht 6' 4" (1 93 m)   Wt 127 kg (280 lb)   BMI 34 08 kg/m²   General: No acute distress, awake and alert  Psychiatric: Mood and affect appear appropriate  HEENT: Trachea Midline, No torticollis, no apparent facial trauma  Cardiovascular: No audible murmurs; Extremities appear perfused  Pulmonary: No audible wheezing or stridor  Skin: No open lesions; see further details (if any) below    MUSCULOSKELETAL EXAMINATION:  Extremities:    Right Knee  There is no effusion  There is tenderness over the medial and lateral joint line  The patient is able to perform a straight leg raise  Toes are pink and mobile  Varus deformity at rest, correctable to neutral  Compartments are soft  Brisk cap refill  Sensation intact  No ligament laxity  The patient is neurovascular intact distally  _____________________________________________________  STUDIES REVIEWED:  I personally reviewed the images and interpretation is as follows:    X-rays taken 2/21/23 of right knee demonstrates tibial plateau fracture that is largely in unchanged varus alignment  Fracture line still apparent    PROCEDURES PERFORMED:  Procedures  None performed         Scribe Attestation    I,:  Zoe Peng am acting as a scribe while in the presence of the attending physician :       I,:  Latricia Matthews MD personally performed the services described in this documentation    as scribed in my presence :

## 2023-02-24 ENCOUNTER — RA CDI HCC (OUTPATIENT)
Dept: OTHER | Facility: HOSPITAL | Age: 58
End: 2023-02-24

## 2023-02-24 ENCOUNTER — TELEPHONE (OUTPATIENT)
Dept: FAMILY MEDICINE CLINIC | Facility: CLINIC | Age: 58
End: 2023-02-24

## 2023-02-24 NOTE — PROGRESS NOTES
[facility]  coding opportunities          Chart Reviewed number of suggestions sent to Provider: 3     Patients Insurance        Commercial Insurance: [id number]     [id number]  [id number]  [id number]

## 2023-02-24 NOTE — TELEPHONE ENCOUNTER
T/c from pt -- states he needs refills for his lancets and test strips  The ones that were sent previously don't fit his machine  Machine is called the one touch ultra 2  Pt also asking if he could set up a virtual appt to go over pain management because he fell and fractured his knee and is unable to leave his house       Please advise

## 2023-02-26 DIAGNOSIS — E11.59 TYPE 2 DIABETES MELLITUS WITH OTHER CIRCULATORY COMPLICATION, WITH LONG-TERM CURRENT USE OF INSULIN (HCC): Primary | ICD-10-CM

## 2023-02-26 DIAGNOSIS — Z79.4 TYPE 2 DIABETES MELLITUS WITH OTHER CIRCULATORY COMPLICATION, WITH LONG-TERM CURRENT USE OF INSULIN (HCC): Primary | ICD-10-CM

## 2023-02-26 RX ORDER — LANCETS 33 GAUGE
EACH MISCELLANEOUS
Qty: 200 EACH | Refills: 3 | Status: ON HOLD | OUTPATIENT
Start: 2023-02-26

## 2023-02-26 RX ORDER — BLOOD-GLUCOSE METER
KIT MISCELLANEOUS
Qty: 1 KIT | Refills: 0 | Status: ON HOLD | OUTPATIENT
Start: 2023-02-26

## 2023-02-26 RX ORDER — BLOOD SUGAR DIAGNOSTIC
STRIP MISCELLANEOUS
Qty: 200 EACH | Refills: 3 | Status: ON HOLD | OUTPATIENT
Start: 2023-02-26

## 2023-03-01 ENCOUNTER — TELEPHONE (OUTPATIENT)
Dept: FAMILY MEDICINE CLINIC | Facility: CLINIC | Age: 58
End: 2023-03-01

## 2023-03-01 NOTE — TELEPHONE ENCOUNTER
T/c from pt -- reports he broke his knee and can not get in and out of his house and has had several falls  His wife is unable to help him  He contacted a rehab/in-patient facility and was told there are several steps he had to go through to be able to get in with them  Reports he contact [facility]  Was told the first step is to get a referral from Dr Karey Tineo to allow them to send a PT to his home to evaluated him  Pt is hoping he can get some sort of help with the whole process, as it sounds like it is going to be quite complicated

## 2023-03-02 ENCOUNTER — TELEPHONE (OUTPATIENT)
Dept: OBGYN CLINIC | Facility: HOSPITAL | Age: 58
End: 2023-03-02

## 2023-03-02 NOTE — TELEPHONE ENCOUNTER
Called and notified pt of Dr Be Benavidez  Reviewed with pt that he is to go to ED and may need to call ambulance to get there as he can't walk to be admitted to hosp for ambulatory dysfunction (spelled this for pt) and they can work on getting him rehab placement  Pt verbalized understanding of info given

## 2023-03-02 NOTE — TELEPHONE ENCOUNTER
Caller: Gigi Carmichael    Doctor: Irma Escalante    Reason for call: Patient is requesting to go to a rehab facility, he can't go to the bathroom and get around his house with his R Knee  Wife isn't able to help him    Please advise    Call back#: [phone]

## 2023-03-02 NOTE — TELEPHONE ENCOUNTER
Spoke with pt - gave him Dr Prather's advisements, pt will follow up with Agustin Lee  Pt expressed verbal understanding  Pt requested to cancel an upcomming appt on 03/06/2023 with Dr Prather and will call to schedule once he is feeling better  No further action is needed at this time

## 2023-03-03 ENCOUNTER — HOSPITAL ENCOUNTER (INPATIENT)
Facility: HOSPITAL | Age: 58
LOS: 10 days | Discharge: NURSING, REHAB OR LONG-TERM CARE | End: 2023-03-13
Attending: EMERGENCY MEDICINE | Admitting: INTERNAL MEDICINE

## 2023-03-03 ENCOUNTER — APPOINTMENT (EMERGENCY)
Dept: RADIOLOGY | Facility: HOSPITAL | Age: 58
End: 2023-03-03

## 2023-03-03 DIAGNOSIS — Z91.199 HISTORY OF NONADHERENCE TO MEDICAL TREATMENT: ICD-10-CM

## 2023-03-03 DIAGNOSIS — G47.00 INSOMNIA, UNSPECIFIED TYPE: ICD-10-CM

## 2023-03-03 DIAGNOSIS — E11.51 TYPE 2 DIABETES MELLITUS WITH DIABETIC PERIPHERAL ANGIOPATHY WITHOUT GANGRENE, WITH LONG-TERM CURRENT USE OF INSULIN (HCC): ICD-10-CM

## 2023-03-03 DIAGNOSIS — R73.9 HYPERGLYCEMIA: ICD-10-CM

## 2023-03-03 DIAGNOSIS — S82.141D CLOSED FRACTURE OF RIGHT TIBIAL PLATEAU WITH ROUTINE HEALING: ICD-10-CM

## 2023-03-03 DIAGNOSIS — R45.851 SUICIDAL IDEATION: Primary | ICD-10-CM

## 2023-03-03 DIAGNOSIS — Z79.4 TYPE 2 DIABETES MELLITUS WITH DIABETIC PERIPHERAL ANGIOPATHY WITHOUT GANGRENE, WITH LONG-TERM CURRENT USE OF INSULIN (HCC): ICD-10-CM

## 2023-03-03 DIAGNOSIS — E88.89 KETOSIS (HCC): ICD-10-CM

## 2023-03-03 PROBLEM — F32.A DEPRESSION: Status: ACTIVE | Noted: 2023-03-03

## 2023-03-03 PROBLEM — R62.7 FAILURE TO THRIVE IN ADULT: Status: ACTIVE | Noted: 2023-03-03

## 2023-03-03 LAB
2HR DELTA HS TROPONIN: 1 NG/L
4HR DELTA HS TROPONIN: 0 NG/L
ALBUMIN SERPL BCP-MCNC: 3.7 G/DL (ref 3.5–5)
ALP SERPL-CCNC: 119 U/L (ref 34–104)
ALT SERPL W P-5'-P-CCNC: 33 U/L (ref 7–52)
ANION GAP SERPL CALCULATED.3IONS-SCNC: 14 MMOL/L (ref 4–13)
ANION GAP SERPL CALCULATED.3IONS-SCNC: 16 MMOL/L (ref 4–13)
ANION GAP SERPL CALCULATED.3IONS-SCNC: 17 MMOL/L (ref 4–13)
AST SERPL W P-5'-P-CCNC: 36 U/L (ref 13–39)
ATRIAL RATE: 74 BPM
BASE EX.OXY STD BLDV CALC-SCNC: 82.2 % (ref 60–80)
BASE EXCESS BLDV CALC-SCNC: 0.3 MMOL/L
BASOPHILS # BLD AUTO: 0.03 THOUSANDS/ÂΜL (ref 0–0.1)
BASOPHILS NFR BLD AUTO: 0 % (ref 0–1)
BETA-HYDROXYBUTYRATE: 3.4 MMOL/L
BILIRUB SERPL-MCNC: 0.85 MG/DL (ref 0.2–1)
BUN SERPL-MCNC: 50 MG/DL (ref 5–25)
BUN SERPL-MCNC: 55 MG/DL (ref 5–25)
BUN SERPL-MCNC: 55 MG/DL (ref 5–25)
CALCIUM SERPL-MCNC: 8.8 MG/DL (ref 8.4–10.2)
CALCIUM SERPL-MCNC: 9.1 MG/DL (ref 8.4–10.2)
CALCIUM SERPL-MCNC: 9.2 MG/DL (ref 8.4–10.2)
CARDIAC TROPONIN I PNL SERPL HS: 10 NG/L
CARDIAC TROPONIN I PNL SERPL HS: 9 NG/L
CARDIAC TROPONIN I PNL SERPL HS: 9 NG/L
CHLORIDE SERPL-SCNC: 90 MMOL/L (ref 96–108)
CHLORIDE SERPL-SCNC: 92 MMOL/L (ref 96–108)
CHLORIDE SERPL-SCNC: 95 MMOL/L (ref 96–108)
CO2 SERPL-SCNC: 22 MMOL/L (ref 21–32)
CREAT SERPL-MCNC: 1.79 MG/DL (ref 0.6–1.3)
CREAT SERPL-MCNC: 2.11 MG/DL (ref 0.6–1.3)
CREAT SERPL-MCNC: 2.15 MG/DL (ref 0.6–1.3)
EOSINOPHIL # BLD AUTO: 0.1 THOUSAND/ÂΜL (ref 0–0.61)
EOSINOPHIL NFR BLD AUTO: 1 % (ref 0–6)
ERYTHROCYTE [DISTWIDTH] IN BLOOD BY AUTOMATED COUNT: 12.3 % (ref 11.6–15.1)
FLUAV RNA RESP QL NAA+PROBE: NEGATIVE
FLUBV RNA RESP QL NAA+PROBE: NEGATIVE
GFR SERPL CREATININE-BSD FRML MDRD: 32 ML/MIN/1.73SQ M
GFR SERPL CREATININE-BSD FRML MDRD: 33 ML/MIN/1.73SQ M
GFR SERPL CREATININE-BSD FRML MDRD: 40 ML/MIN/1.73SQ M
GLUCOSE SERPL-MCNC: 178 MG/DL (ref 65–140)
GLUCOSE SERPL-MCNC: 179 MG/DL (ref 65–140)
GLUCOSE SERPL-MCNC: 240 MG/DL (ref 65–140)
GLUCOSE SERPL-MCNC: 288 MG/DL (ref 65–140)
GLUCOSE SERPL-MCNC: 290 MG/DL (ref 65–140)
GLUCOSE SERPL-MCNC: 300 MG/DL (ref 65–140)
GLUCOSE SERPL-MCNC: 308 MG/DL (ref 65–140)
HCO3 BLDV-SCNC: 23.4 MMOL/L (ref 24–30)
HCT VFR BLD AUTO: 32.7 % (ref 36.5–49.3)
HGB BLD-MCNC: 11.4 G/DL (ref 12–17)
IMM GRANULOCYTES # BLD AUTO: 0.04 THOUSAND/UL (ref 0–0.2)
IMM GRANULOCYTES NFR BLD AUTO: 1 % (ref 0–2)
LYMPHOCYTES # BLD AUTO: 1.35 THOUSANDS/ÂΜL (ref 0.6–4.47)
LYMPHOCYTES NFR BLD AUTO: 17 % (ref 14–44)
MCH RBC QN AUTO: 31.2 PG (ref 26.8–34.3)
MCHC RBC AUTO-ENTMCNC: 34.9 G/DL (ref 31.4–37.4)
MCV RBC AUTO: 90 FL (ref 82–98)
MONOCYTES # BLD AUTO: 1.03 THOUSAND/ÂΜL (ref 0.17–1.22)
MONOCYTES NFR BLD AUTO: 13 % (ref 4–12)
NEUTROPHILS # BLD AUTO: 5.2 THOUSANDS/ÂΜL (ref 1.85–7.62)
NEUTS SEG NFR BLD AUTO: 68 % (ref 43–75)
NRBC BLD AUTO-RTO: 0 /100 WBCS
O2 CT BLDV-SCNC: 14.4 ML/DL
P AXIS: 26 DEGREES
PCO2 BLDV: 33 MM HG (ref 42–50)
PH BLDV: 7.47 [PH] (ref 7.3–7.4)
PLATELET # BLD AUTO: 199 THOUSANDS/UL (ref 149–390)
PMV BLD AUTO: 11.3 FL (ref 8.9–12.7)
PO2 BLDV: 47.4 MM HG (ref 35–45)
POTASSIUM SERPL-SCNC: 3.3 MMOL/L (ref 3.5–5.3)
POTASSIUM SERPL-SCNC: 3.4 MMOL/L (ref 3.5–5.3)
POTASSIUM SERPL-SCNC: 3.9 MMOL/L (ref 3.5–5.3)
PR INTERVAL: 174 MS
PROT SERPL-MCNC: 7.1 G/DL (ref 6.4–8.4)
QRS AXIS: -28 DEGREES
QRSD INTERVAL: 96 MS
QT INTERVAL: 484 MS
QTC INTERVAL: 537 MS
RBC # BLD AUTO: 3.65 MILLION/UL (ref 3.88–5.62)
RSV RNA RESP QL NAA+PROBE: NEGATIVE
SARS-COV-2 RNA RESP QL NAA+PROBE: NEGATIVE
SODIUM SERPL-SCNC: 129 MMOL/L (ref 135–147)
SODIUM SERPL-SCNC: 130 MMOL/L (ref 135–147)
SODIUM SERPL-SCNC: 131 MMOL/L (ref 135–147)
T WAVE AXIS: -29 DEGREES
VENTRICULAR RATE: 74 BPM
WBC # BLD AUTO: 7.75 THOUSAND/UL (ref 4.31–10.16)

## 2023-03-03 RX ORDER — POTASSIUM CHLORIDE 14.9 MG/ML
20 INJECTION INTRAVENOUS ONCE
Status: COMPLETED | OUTPATIENT
Start: 2023-03-03 | End: 2023-03-03

## 2023-03-03 RX ORDER — NALTREXONE HYDROCHLORIDE 50 MG/1
50 TABLET, FILM COATED ORAL DAILY
Status: DISCONTINUED | OUTPATIENT
Start: 2023-03-04 | End: 2023-03-03

## 2023-03-03 RX ORDER — ONDANSETRON 2 MG/ML
2 INJECTION INTRAMUSCULAR; INTRAVENOUS ONCE
Status: COMPLETED | OUTPATIENT
Start: 2023-03-03 | End: 2023-03-03

## 2023-03-03 RX ORDER — ESCITALOPRAM OXALATE 10 MG/1
10 TABLET ORAL DAILY
Status: DISCONTINUED | OUTPATIENT
Start: 2023-03-04 | End: 2023-03-13 | Stop reason: HOSPADM

## 2023-03-03 RX ORDER — ACETAMINOPHEN 325 MG/1
650 TABLET ORAL EVERY 6 HOURS PRN
Status: DISCONTINUED | OUTPATIENT
Start: 2023-03-03 | End: 2023-03-04

## 2023-03-03 RX ORDER — ONDANSETRON 2 MG/ML
4 INJECTION INTRAMUSCULAR; INTRAVENOUS EVERY 6 HOURS PRN
Status: DISCONTINUED | OUTPATIENT
Start: 2023-03-03 | End: 2023-03-13 | Stop reason: HOSPADM

## 2023-03-03 RX ORDER — TRAMADOL HYDROCHLORIDE 50 MG/1
50 TABLET ORAL EVERY 6 HOURS PRN
Status: DISCONTINUED | OUTPATIENT
Start: 2023-03-03 | End: 2023-03-04

## 2023-03-03 RX ORDER — POTASSIUM CHLORIDE 14.9 MG/ML
20 INJECTION INTRAVENOUS
Status: COMPLETED | OUTPATIENT
Start: 2023-03-03 | End: 2023-03-04

## 2023-03-03 RX ORDER — MIRTAZAPINE 15 MG/1
15 TABLET, FILM COATED ORAL
Status: DISCONTINUED | OUTPATIENT
Start: 2023-03-03 | End: 2023-03-03

## 2023-03-03 RX ORDER — ATORVASTATIN CALCIUM 40 MG/1
40 TABLET, FILM COATED ORAL
Status: DISCONTINUED | OUTPATIENT
Start: 2023-03-04 | End: 2023-03-13 | Stop reason: HOSPADM

## 2023-03-03 RX ORDER — SODIUM CHLORIDE 9 MG/ML
150 INJECTION, SOLUTION INTRAVENOUS CONTINUOUS
Status: DISCONTINUED | OUTPATIENT
Start: 2023-03-03 | End: 2023-03-04

## 2023-03-03 RX ORDER — HYDROXYZINE HYDROCHLORIDE 25 MG/1
25 TABLET, FILM COATED ORAL EVERY 6 HOURS PRN
Status: DISCONTINUED | OUTPATIENT
Start: 2023-03-03 | End: 2023-03-13 | Stop reason: HOSPADM

## 2023-03-03 RX ORDER — CLOPIDOGREL BISULFATE 75 MG/1
75 TABLET ORAL DAILY
Status: DISCONTINUED | OUTPATIENT
Start: 2023-03-04 | End: 2023-03-13 | Stop reason: HOSPADM

## 2023-03-03 RX ORDER — PANTOPRAZOLE SODIUM 40 MG/1
40 TABLET, DELAYED RELEASE ORAL
Status: DISCONTINUED | OUTPATIENT
Start: 2023-03-04 | End: 2023-03-13 | Stop reason: HOSPADM

## 2023-03-03 RX ORDER — LEVOTHYROXINE SODIUM 0.15 MG/1
150 TABLET ORAL DAILY
Status: DISCONTINUED | OUTPATIENT
Start: 2023-03-04 | End: 2023-03-13 | Stop reason: HOSPADM

## 2023-03-03 RX ORDER — MIRTAZAPINE 15 MG/1
7.5 TABLET, FILM COATED ORAL
Status: DISCONTINUED | OUTPATIENT
Start: 2023-03-03 | End: 2023-03-13 | Stop reason: HOSPADM

## 2023-03-03 RX ORDER — ASPIRIN 81 MG/1
81 TABLET, CHEWABLE ORAL DAILY
Status: DISCONTINUED | OUTPATIENT
Start: 2023-03-04 | End: 2023-03-13 | Stop reason: HOSPADM

## 2023-03-03 RX ORDER — POTASSIUM CHLORIDE 29.8 MG/ML
40 INJECTION INTRAVENOUS ONCE
Status: DISCONTINUED | OUTPATIENT
Start: 2023-03-03 | End: 2023-03-03

## 2023-03-03 RX ADMIN — SODIUM CHLORIDE 150 ML/HR: 0.9 INJECTION, SOLUTION INTRAVENOUS at 20:17

## 2023-03-03 RX ADMIN — POTASSIUM CHLORIDE 20 MEQ: 14.9 INJECTION, SOLUTION INTRAVENOUS at 21:26

## 2023-03-03 RX ADMIN — SODIUM CHLORIDE 1000 ML: 0.9 INJECTION, SOLUTION INTRAVENOUS at 18:03

## 2023-03-03 RX ADMIN — SODIUM CHLORIDE 6 UNITS/HR: 9 INJECTION, SOLUTION INTRAVENOUS at 18:39

## 2023-03-03 RX ADMIN — POTASSIUM CHLORIDE 20 MEQ: 14.9 INJECTION, SOLUTION INTRAVENOUS at 18:04

## 2023-03-03 RX ADMIN — POTASSIUM CHLORIDE 20 MEQ: 14.9 INJECTION, SOLUTION INTRAVENOUS at 23:40

## 2023-03-03 RX ADMIN — SODIUM CHLORIDE 1000 ML: 0.9 INJECTION, SOLUTION INTRAVENOUS at 12:46

## 2023-03-03 RX ADMIN — MIRTAZAPINE 7.5 MG: 15 TABLET, FILM COATED ORAL at 23:40

## 2023-03-03 NOTE — ED PROVIDER NOTES
History  Chief Complaint   Patient presents with   • Failure To Thrive     Fall w/ knee injury about a month ago, since has been unable to ambulate or care for self  Poor PO intake, multiple falls, unable to regulate BG  Lives w/ wife who is also unable to care for pt  • Psychiatric Evaluation     Pt states suicidal ideations for weeks w/ plan to shoot self w/ a gun, pt w/o access to firearms as wife has locked them up  Pt states "I just don't want to put the burden on my wife " Denies any intent to carry out plan, denies preparatory acts  Patient is a 38-year-old male past medical history of hypertension, hyperlipidemia, diabetes, CKD stage III, GERD, thyroid cancer, peripheral artery disease, alcohol abuse presenting with suicidal ideation, failure to thrive  Patient states that roughly 1 month ago he fell down stairs and broke his right knee  States he may have had head trauma at that time however has had multiple falls since then but denies any head trauma and states he does not believe he sustained any injuries in those falls last 3 to 4 days ago  He states that he has difficulty getting himself up and that his wife cannot get him up  He states that he often is not able to get to the bathroom in time and has accidents  He states he cannot care for himself at home because of his injury and his wife cannot either  Notes decreased p o  intake as well as intermittent vomiting and dry heaving in the morning, and diarrhea for the past several days  He notes suicidal ideation for the past several weeks with plan to shoot himself but states that his wife locked up his firearms  Denies any history of suicide attempts or suicidal ideation, does not take any medicines for psychiatric diseases has never been hospitalized in a psychiatric institution and denies any current drug or alcohol use or auditory visual hallucinations  Does note decreased p o  intake in the last several weeks    Denies any fevers, shortness of breath, rashes, vision changes, dysuria but does note lightheadedness as well as chest discomfort last week  Prior to Admission Medications   Prescriptions Last Dose Informant Patient Reported? Taking? BD PEN NEEDLE DEV U/F 32G X 4 MM MISC   No No   Sig: Inject under the skin 4 (four) times a day   Blood Glucose Monitoring Suppl (ONE TOUCH ULTRA 2) w/Device KIT   No No   Sig: by Does not apply route 2 (two) times a day   Blood Glucose Monitoring Suppl (OneTouch Verio Reflect) w/Device KIT   No No   Sig: Check blood sugars twice daily  Please substitute with appropriate alternative as covered by patient's insurance  Dx: E11 65   Insulin Glargine Solostar (Lantus SoloStar) 100 UNIT/ML SOPN   No No   Sig: Inject 45 units bid   OneTouch Delica Lancets 16U MISC   No No   Sig: Check blood sugars twice daily  Please substitute with appropriate alternative as covered by patient's insurance  Dx: E11 65   aspirin 81 mg chewable tablet   No No   Sig: Chew 1 tablet (81 mg total) daily   atorvastatin (LIPITOR) 40 mg tablet   No No   Sig: Take 1 tablet (40 mg total) by mouth daily with dinner   clopidogrel (PLAVIX) 75 mg tablet   No No   Sig: Take 1 tablet (75 mg total) by mouth daily   docusate sodium (COLACE) 100 mg capsule   No No   Sig: Take 1 capsule (100 mg total) by mouth 2 (two) times a day   glucose blood (OneTouch Verio) test strip   No No   Sig: Check blood sugars twice daily  Please substitute with appropriate alternative as covered by patient's insurance   Dx: E11 65   insulin glulisine (Apidra) 100 units/mL injection   No No   Sig: Inject 30 units tid with meals   irbesartan-hydrochlorothiazide (AVALIDE) 150-12 5 MG per tablet   No No   Sig: TAKE 1 TABLET DAILY   levothyroxine 150 mcg tablet   No No   Sig: Take 1 tablet (150 mcg total) by mouth daily   naltrexone (REVIA) 50 mg tablet   No No   Sig: Take 1 tablet (50 mg total) by mouth daily   omeprazole (PriLOSEC) 40 MG capsule   No No   Sig: TAKE 1 CAPSULE DAILY   polyethylene glycol (MIRALAX) 17 g packet   No No   Sig: Take 17 g by mouth daily   senna (SENOKOT) 8 6 MG tablet   No No   Sig: Take 1 tablet (8 6 mg total) by mouth daily   traMADol (Ultram) 50 mg tablet   No No   Sig: Take 1 tablet (50 mg total) by mouth every 6 (six) hours as needed for moderate pain   verapamil (VERELAN PM) 360 MG 24 hr capsule   No No   Sig: TAKE 1 CAPSULE DAILY AT BEDTIME   zolpidem (AMBIEN) 10 mg tablet   No No   Sig: Take 1 tablet (10 mg total) by mouth daily at bedtime as needed for sleep      Facility-Administered Medications: None       Past Medical History:   Diagnosis Date   • Broken internal right knee prosthesis ([facility] ) 01/2023   • Chronic kidney disease    • Colon polyp    • Diabetes mellitus ([facility] )    • Hyperlipidemia    • Hypertension    • [facility] )        Past Surgical History:   Procedure Laterality Date   • COLONOSCOPY     • FOOT AMPUTATION Right    • FOOT SURGERY Right 2014   • IR AORTAGRAM WITH RUN-OFF  08/06/2020   • IR TUNNELED CENTRAL LINE PLACEMENT  09/08/2020   • IR TUNNELED CENTRAL LINE REMOVAL  09/28/2020   • [place] AMPUTATION FOOT TRANSMETARSAL Right 09/03/2020    Procedure: AMPUTATION TRANSMETATARSAL (TMA);  Surgeon: Bertha Mustafa DPM;  [facility];  Service: Podiatry   • OH THYROIDECTOMY TOTAL/COMPLETE N/A 02/03/2021    Procedure: TOTAL THYROIDECTOMY WITH NIMS MONITORING;  Surgeon: Kavya Harrison MD;  Location: [facility];  Service: ENT   • TOE AMPUTATION Right 08/11/2020    Procedure: AMPUTATION TOE;  Surgeon: Bertha Mustafa DPM;  [facility]: [facility];  Service: Podiatry   • US GUIDED THYROID BIOPSY  07/02/2020       Family History   Problem Relation Age of Onset   • Cancer Mother    • Hypertension Father      I have reviewed and agree with the history as documented      E-Cigarette/Vaping   • E-Cigarette Use Never User      E-Cigarette/Vaping Substances   • Nicotine No    • THC No    • CBD No    • Flavoring No    • Other No    • Unknown No      Social History     Tobacco Use   • Smoking status: Never   • Smokeless tobacco: Never   Vaping Use   • Vaping Use: Never used   Substance Use Topics   • Alcohol use: Not Currently     Alcohol/week: 8 0 standard drinks     Types: 8 Cans of beer per week     Comment: last drink was 10 days ago   • Drug use: Yes     Types: Marijuana     Comment: occassionally       Review of Systems   All other systems reviewed and are negative  Physical Exam  Physical Exam  Vitals reviewed  Constitutional:       General: He is not in acute distress  Appearance: Normal appearance  He is not ill-appearing  HENT:      Mouth/Throat:      Mouth: Mucous membranes are moist    Eyes:      Conjunctiva/sclera: Conjunctivae normal    Cardiovascular:      Rate and Rhythm: Normal rate and regular rhythm  Pulses: Normal pulses  Heart sounds: Normal heart sounds  Pulmonary:      Effort: Pulmonary effort is normal       Comments: Diminished lung sounds in the left midlung field  Abdominal:      General: Abdomen is flat  Palpations: Abdomen is soft  Tenderness: There is no abdominal tenderness  Musculoskeletal:         General: No swelling  Normal range of motion  Cervical back: Neck supple  Skin:     General: Skin is warm and dry  Neurological:      General: No focal deficit present  Mental Status: He is alert  Motor: No weakness        Comments: Moving all extremities equally, mentating appropriately   Psychiatric:         Mood and Affect: Mood normal          Vital Signs  ED Triage Vitals [03/03/23 1144]   Temperature Pulse Respirations Blood Pressure SpO2   97 7 °F (36 5 °C) 71 20 111/60 99 %      Temp Source Heart Rate Source Patient Position - Orthostatic VS BP Location FiO2 (%)   Oral -- Lying Right arm --      Pain Score       6           Vitals:    03/03/23 1144   BP: 111/60   Pulse: 71   Patient Position - Orthostatic VS: Lying         Visual Acuity      ED Medications  Medications   sodium chloride 0 9 % bolus 1,000 mL (has no administration in time range)   ondansetron (FOR EMS ONLY) (ZOFRAN) 4 mg/2 mL injection 8 mg (0 mg Does not apply Given to EMS 3/3/23 1146)       Diagnostic Studies  Results Reviewed     Procedure Component Value Units Date/Time    Fingerstick Glucose (POCT) [[id number]]  (Abnormal) Collected: 03/03/23 1148    Lab Status: Final result Updated: 03/03/23 1149     POC Glucose 288 mg/dl                  No orders to display              Procedures  ECG 12 Lead Documentation Only    Date/Time: 3/3/2023 12:23 PM  Performed by: Laura Alexandre DO  Authorized by: Laura Alexandre DO     Patient location:  ED  Previous ECG:     Previous ECG:  Compared to current    Comparison ECG info:  Diffuse T wave flattening otherwise unchanged  Rate:     ECG rate assessment: normal    Rhythm:     Rhythm: sinus rhythm    QRS:     QRS axis:  Left    QRS intervals:  Normal  Conduction:     Conduction: normal    ST segments:     ST segments:  Normal  T waves:     T waves: flattening and inverted      Flattening:  V4, V5, V6, V2, V3, aVL, aVF, II, III, aVR and V1    Inverted: I             ED Course  ED Course as of 03/03/23 1943   Elba Vasquez Mar 03, 2023   1619 Patient with mildly increased creatinine after fluid bolus,, still with mild gap, will obtain beta hydroxybutyrate and VBG and likely admit for worsening kidney function, psychiatry has completed telepsychiatry consult and recommending inpatient treatment and has recommended medication adjustments  1751 Patient with elevated beta hydroxybutyrate, will discuss with ICU for admission  1810 ICU states likely starvation ketosis, recommend non-DKA insulin drip and further fluid repletion, will admit                                               Medical Decision Making  Patient is a 61-year-old male with a past medical history of hypertension, hyperlipidemia, GERD, diabetes, alcohol abuse, peripheral artery disease, thyroid cancer, CKD stage III presenting with failure to thrive and suicidal ideation  Patient is well-appearing at bedside with stable vitals and in no acute distress  He is moving all extremities equally with soft nontender abdomen, diminished lung sounds on the left but no increased respiratory effort and saturating appropriately on room air, no other significant physical exam findings  Will obtain labs given decreased p o  intake and history of chest discomfort and lightheadedness will rule out electrolyte abnormalities, anemia, ACS, obtain chest x-ray to rule out pneumonia, pneumothorax, volume overload  We will consult psychiatry as patient does note suicidal ideation with plan however seems to be very situational related to his inability to care for himself  Feel that if patient could be placed in rehab which is likely the most beneficial placement for patient that he likely would not require inpatient psych and will consult psychiatry for further evaluation  If patient is cleared from psychiatry perspective will consult PT, OT, case management and attempt to place in acute rehab  Amount and/or Complexity of Data Reviewed  Labs: ordered  Radiology: ordered  Risk  Prescription drug management  Disposition  Final diagnoses:   Suicidal ideation     Time reflects when diagnosis was documented in both MDM as applicable and the Disposition within this note     Time User Action Codes Description Comment    3/3/2023 12:12 PM Guillaume Prather Add [A96 [id number]] Suicidal ideation       ED Disposition     None      Follow-up Information    None         Patient's Medications   Discharge Prescriptions    No medications on file       No discharge procedures on file      PDMP Review     None          ED Provider  Electronically Signed by           Yesica Styles DO  03/03/23 1073

## 2023-03-03 NOTE — PROGRESS NOTES
Progress Note - Triage Asssessment   Kristy Mckinley 59 y o  male MRN: [id number]    Time Called ( Time): 1800  Date Called: 03/03/23  Room#: ED 10  Person requesting evaluation: Dr Joanne Wilson     Situation:    Patient presented with elevated BGL, mildly elevated anion gap, and recent nausea and vomiting  CC contacted for evaluation of possible DKA admission  Interventions:   N/A         Triage Assessment:     Patient can be admitted to [facility]urg level of care     Suspect elevated AG likely due primarily to starvation ketosis in the setting of vomiting versus DKA  Likely mixed etiology  Agree to continue IV fluids  Recommend regular insulin drip and continue to closely monitor BGL and electrolytes         Recommendations discussed with Dr Bhavna Mejia

## 2023-03-03 NOTE — ARC ADMISSION
Referral received for ARC  Pending review with [facility] physician

## 2023-03-03 NOTE — PHYSICAL THERAPY NOTE
Physical Therapy Evaluation     Patient's Name: Sri Darby    Admitting Diagnosis  Failure to thrive in adult [R62 7]  Encounter for psychological evaluation [Z00 8]    Problem List  Patient Active Problem List   Diagnosis    Hypertension, essential    Gastroesophageal reflux disease without esophagitis    Hyperlipidemia    History of nonadherence to medical treatment    Type 2 diabetes mellitus with diabetic polyneuropathy, with long-term current use of insulin (HCC)    Right-sided tinnitus    Colon polyps    Elevated liver enzymes    Health maintenance examination    PAD (peripheral artery disease) ([facility] )    Thyroid cancer ([facility] )    Urinary retention    Hypomagnesemia    Anemia    Abnormal EKG    Insomnia    Persistent proteinuria    Acquired absence [facility] right [facility] )    Acquired hypothyroidism    Physical deconditioning    Type 2 diabetes mellitus with diabetic peripheral angiopathy without gangrene ([facility] )    Dupuytren's contracture of right hand    Stage 3b chronic kidney disease ([facility] )    Hyperkalemia    Alcoholism ([facility] )    Closed fracture of right tibial plateau    Obesity, Class I, BMI 30 0-34 9 (see actual BMI)    MARKELL (acute kidney injury) ([place] [address] )    Chronic kidney disease-mineral and bone disorder    Acquired genu varum of right lower extremity       Past Medical History  Past Medical History:   Diagnosis Date    Broken internal right knee prosthesis ([facility] ) 01/2023    Chronic kidney disease     Colon polyp     Diabetes mellitus ([facility] )     Hyperlipidemia     Hypertension     Thyroid cancer ([facility] )        Past Surgical History  Past Surgical History:   Procedure Laterality Date    COLONOSCOPY      FOOT AMPUTATION Right     FOOT SURGERY Right 2014    IR AORTAGRAM WITH RUN-OFF  08/06/2020    IR TUNNELED CENTRAL LINE PLACEMENT  09/08/2020    IR TUNNELED CENTRAL LINE REMOVAL  09/28/2020    SC AMPUTATION FOOT TRANSMETARSAL Right 09/03/2020    Procedure: AMPUTATION TRANSMETATARSAL (TMA);  Surgeon: Miya Colon DPM; [facility]: [facility];  Service: Podiatry    [place] THYROIDECTOMY TOTAL/COMPLETE N/A 02/03/2021    Procedure: TOTAL THYROIDECTOMY WITH NIMS MONITORING;  Surgeon: Josue Chen MD;  Location: [facility];  Service: ENT    TOE AMPUTATION Right 08/11/2020    Procedure: AMPUTATION TOE;  Surgeon: Kayla Garduno DPM;  Location: [facility];  Service: [address] THYROID BIOPSY  07/02/2020 03/03/23 1247   PT Last Visit   PT Visit Date 03/03/23   Note Type   Note type Evaluation   Pain Assessment   Pain Assessment Tool 0-10   Pain Score 6   Pain Location/Orientation Orientation: Right;Location: Knee   Pain Onset/Description Onset: Ongoing;Frequency: Constant/Continuous   Patient's Stated Pain Goal No pain   Hospital Pain Intervention(s) Repositioned   Restrictions/Precautions   Weight Bearing Precautions Per Order Yes   RLE Weight Bearing Per Order NWB   Braces or Orthoses Knee brace  (RLE)   Other Precautions Multiple lines;Telemetry; Fall Risk;1:1   Home Living   Type of [address] [place] [address] Ave One level;Performs ADLs on one level; Able to live on main level with bedroom/bathroom;Stairs to enter with rails  (3-4 DANIEL)   Bathroom Shower/Tub   (sponge bathing at baseline)   [address]   Additional Comments Pt reports he has been unable to get to bathroom or toilet for the past few days  Pt's spouse is unable to provide the necessary level of assistance   Prior Function   Level of [place] Needs assistance with functional mobility; Needs assistance with ADLs; Needs assistance with IADLS   Lives With Spouse   Receives Help From Family   IADLs Family/Friend/Other provides transportation; Family/Friend/Other provides meals; Family/Friend/Other provides medication management   Falls in the last 6 months (S)  5 to 10   Vocational On disability   General   Family/Caregiver Present No   Cognition Overall Cognitive Status WFL   Arousal/Participation Alert   Orientation Level Oriented X4   Memory Within functional limits   Following Commands Follows all commands and directions without difficulty   Comments Pt agreeable to PT   RLE Assessment   RLE Assessment X   Strength RLE   RLE Overall Strength 3-/5  (comprehensively testing limited due to pain)   LLE Assessment   LLE Assessment X   Strength LLE   LLE Overall Strength 4/5   Vision-Basic Assessment   Current Vision Wears glasses all the time   Light Touch   RLE Light Touch Impaired   LLE Light Touch Impaired   Bed Mobility   Supine to Sit 4  Minimal assistance   Additional items Assist x 2;HOB elevated; Bedrails; Increased time required;Verbal cues   Additional Comments Pt able to transition into long sit position and maintain for approx 3 mins  Pt reported increased lightheadedness with position changes and nausea and unable to transition to short sit position  Transfers   Sit to Stand Unable to assess   Ambulation/Elevation   Gait pattern Not tested   Endurance Deficit   Endurance Deficit Yes   Endurance Deficit Description decreased activity tolerance   Activity Tolerance   Activity Tolerance Patient limited by fatigue   Medical Staff Made Aware Co-evaluation performed with ILA Lopez due to medical complexity and clinical presentation   Nurse Made Aware BONI Leos   Assessment   Prognosis Good   Problem List Decreased strength;Decreased endurance; Impaired balance;Decreased mobility;Pain;Decreased skin integrity;Orthopedic restrictions   Assessment Pt is 59 y o  male seen for PT evaluation s/p admit to [facility] on 3/3/2023 w/ <principal problem not specified>  PT consulted to assess pt's functional mobility and d/c needs  Order placed for PT eval and tx, w/ up w/ A order  Comorbidities affecting pt's physical performance at time of assessment include: failure to thrive, suicidal ideation, alcohol abuse, peripheral artery disease, CKD, DM, HLD, HTN  PTA, pt was requiring A for mobility, has 3-4 DANIEL, lives w/ spouse in one level house and on disability  Personal factors affecting pt at time of IE include: stairs to enter home, inability to ambulate household distances, inability to navigate community distances, inability to navigate level surfaces w/o external assistance, unable to perform dynamic tasks in community, positive fall history, inability to perform IADLs and inability to perform ADLs  Please find objective findings from PT assessment regarding body systems outlined above with impairments and limitations including weakness, impaired balance, decreased endurance, pain, decreased activity tolerance, decreased functional mobility tolerance, altered sensation and fall risk  The following objective measures performed on IE also reveal limitations: Barthel Index: 25/100, Modified Nirali: 5 (severe disability) and AM-PAC 6-Clicks: 3/02  Pt's clinical presentation is currently unstable/unpredictable seen in pt's presentation of continued need for medical management and monitoring, decreased strength and balance resulting in an increased risk for falls, decreased endurance and activity tolerance limiting mobility, increased pain  Pt to benefit from continued PT tx to address deficits as defined above and maximize level of functional independent mobility and consistency  From PT/mobility standpoint, recommendation at time of d/c would be post acute rehabilitation services pending progress in order to facilitate return to PLOF  Barriers to Discharge Inaccessible home environment;Decreased caregiver support; Other (Comment)  (decline in mobility status)   Goals   STG Expiration Date 03/13/23   Short Term Goal #1 In 7-10 days: Increase bilateral LE strength 1/2 grade to facilitate independent mobility, Perform all bed mobility tasks with min A of 1 to decrease caregiver burden and PT to see and establish goals for transfers and ambulation when appropriate   Plan Treatment/Interventions LE strengthening/ROM;ADL retraining;Functional transfer training; Therapeutic exercise; Endurance training;Patient/family training;Bed mobility;Gait training; Compensatory technique education;Spoke to nursing;OT   PT Frequency 3-5x/wk   Recommendation   PT Discharge Recommendation Post acute rehabilitation services   AM-PAC Basic Mobility Inpatient   Turning in Flat Bed Without Bedrails 1   Lying on Back to Sitting on Edge of Flat Bed Without Bedrails 1   Moving Bed to Chair 1   Standing Up From Chair Using Arms 1   Walk in Room 1   Climb 3-5 Stairs With Railing 1   Basic Mobility Inpatient Raw Score 6   Turning Head Towards Sound 4   Follow Simple Instructions 4   Low Function Basic Mobility Raw Score 14   Low Function Basic Mobility Standardized Score 22 01   Highest Level Of Mobility   -[facility] Goal 2: Bed activities/Dependent transfer   -HL Achieved 2: Bed activities/Dependent transfer   Modified Rienzi Scale   Modified Rienzi Scale 5   Barthel Index   Feeding 10   Bathing 0   Grooming Score 0   Dressing Score 5   Bladder Score 0   Bowels Score 0   Toilet Use Score 5   Transfers (Bed/Chair) Score 5   Mobility (Level Surface) Score 0   Stairs Score 0   Barthel Index Score 25       Lilli Sharma, PT

## 2023-03-03 NOTE — PLAN OF CARE
Problem: PHYSICAL THERAPY ADULT  Goal: Performs mobility at highest level of function for planned discharge setting  See evaluation for individualized goals  Description: Treatment/Interventions: LE strengthening/ROM, ADL retraining, Functional transfer training, Therapeutic exercise, Endurance training, Patient/family training, Bed mobility, Gait training, Compensatory technique education, Spoke to nursing, OT          See flowsheet documentation for full assessment, interventions and recommendations  Outcome: Progressing  Note: Prognosis: Good  Problem List: Decreased strength, Decreased endurance, Impaired balance, Decreased mobility, Pain, Decreased skin integrity, Orthopedic restrictions  Assessment: Pt is 59 y o  male seen for PT evaluation s/p admit to [facility] on 3/3/2023 w/ <principal problem not specified>  PT consulted to assess pt's functional mobility and d/c needs  Order placed for PT eval and tx, w/ up w/ A order  Comorbidities affecting pt's physical performance at time of assessment include: failure to thrive, suicidal ideation, alcohol abuse, peripheral artery disease, CKD, DM, HLD, HTN  PTA, pt was requiring A for mobility, has 3-4 DANIEL, lives w/ spouse in one level house and on disability  Personal factors affecting pt at time of IE include: stairs to enter home, inability to ambulate household distances, inability to navigate community distances, inability to navigate level surfaces w/o external assistance, unable to perform dynamic tasks in community, positive fall history, inability to perform IADLs and inability to perform ADLs  Please find objective findings from PT assessment regarding body systems outlined above with impairments and limitations including weakness, impaired balance, decreased endurance, pain, decreased activity tolerance, decreased functional mobility tolerance, altered sensation and fall risk   The following objective measures performed on IE also reveal limitations: Barthel Index: 25/100, Modified Nirali: 5 (severe disability) and AM-PAC 6-Clicks: 4/37  Pt's clinical presentation is currently unstable/unpredictable seen in pt's presentation of continued need for medical management and monitoring, decreased strength and balance resulting in an increased risk for falls, decreased endurance and activity tolerance limiting mobility, increased pain  Pt to benefit from continued PT tx to address deficits as defined above and maximize level of functional independent mobility and consistency  From PT/mobility standpoint, recommendation at time of d/c would be post acute rehabilitation services pending progress in order to facilitate return to PLOF  Barriers to Discharge: Inaccessible home environment, Decreased caregiver support, Other (Comment) (decline in mobility status)     PT Discharge Recommendation: Post acute rehabilitation services    See flowsheet documentation for full assessment

## 2023-03-03 NOTE — CASE MANAGEMENT
Case Management Assessment & Discharge Planning Note    Patient name Sofia Mccoy  Location ED 10/ED 10 MRN [id number]  : 1965 Date 3/3/2023       Current Admission Date: 3/3/2023  Current Admission Diagnosis:Failure to thrive in adult, Encounter for psychological evaluation   Patient Active Problem List    Diagnosis Date Noted   • Acquired genu varum of right lower extremity 2023   • MARKELL (acute kidney injury) ([facility] ) 2023   • Chronic kidney disease-mineral and bone disorder 2023   • Closed fracture of right tibial plateau    • Stage 3b chronic kidney disease ([facility] ) 11/15/2022   • Hyperkalemia 11/15/2022   • Alcoholism ([facility] ) 11/15/2022   • Dupuytren's contracture of right hand 2022   • Type 2 diabetes mellitus with diabetic peripheral angiopathy without gangrene ([facility] ) 2022   • Physical deconditioning 2021   • Acquired hypothyroidism 10/18/2021   • Acquired absence of right foot ([facility] ) 2021   • Persistent proteinuria 2021   • Abnormal EKG 2020   • Insomnia 2020   • Anemia 10/08/2020   • Hypomagnesemia 2020   • Urinary retention 2020   • Thyroid cancer ([facility] ) 2020   • PAD (peripheral artery disease) ([facility] ) 2020   • Health maintenance examination 2020   • Elevated liver enzymes 10/24/2019   • Colon polyps 2019   • Right-sided tinnitus 2018   • Type 2 diabetes mellitus with diabetic polyneuropathy, with long-term current use of insulin ([facility] ) 2018   • Hypertension, essential 2018   • Gastroesophageal reflux disease without esophagitis 2018   • Obesity, Class I, BMI 30 0-34 9 (see actual BMI) 2010   • Hyperlipidemia 2009   • History of nonadherence to medical treatment 2008      LOS (days): 0  Geometric Mean LOS (GMLOS) (days):   Days to GMLOS:     OBJECTIVE:              Current admission status: Emergency       Preferred Pharmacy:   [address] [place] [address] #[id number] - Mara Plummer  [address]  [facility] [address]  Phone: [phone] Fax: [phone] [address] [place], [address] [place] [address]  Phone: [phone] Fax: [phone]    Primary Care Provider: Murali Enriquez MD    Primary Insurance: BLUE CROSS  Secondary Insurance:     ASSESSMENT:  Active Health Care Proxies    There are no active Health Care Proxies on file  Advance Directives  Does patient have a [address]?: No  Was patient offered paperwork?: Yes (Patient accepted paperwork)  Does patient currently have a Health Care decision maker?: Yes, please see Health Care Proxy section (Patient stated spouse Rima Gan)  Does patient have Advance Directives?: No  Was patient offered paperwork?: Yes (Patient accepted paperwork)         Readmission Root Cause  30 Day Readmission: No    Patient Information  Admitted from[de-identified] Home  Mental Status: Alert  During Assessment patient was accompanied by: Not accompanied during assessment  Assessment information provided by[de-identified] Patient  Primary Caregiver: Self  Support Systems: Spouse/significant other  [place] of Residence: [place] [address] do you live in?: [address] entry access options   Select all that apply : Stairs  Number of steps to enter home : 3  Type of Current Residence: [place]  In the last 12 months, was there a time when you were not able to pay the mortgage or rent on time?: No  In the last 12 months, how many places have you lived?: 1  In the last 12 months, was there a time when you did not have a steady place to sleep or slept in a shelter (including now)?: No  Homeless/housing insecurity resource given?: N/A  Living Arrangements: Lives w/ Spouse/significant other  Is patient a ?: No    Activities of Daily Living Prior to Admission  Functional Status: Assistance  Completes ADLs independently?: No  Level of ADL dependence: Assistance  Ambulates independently?: No  Level of ambulatory dependence: Assistance  Does patient use assisted devices?: No  Does patient currently own DME?: Yes  What DME does the patient currently own?: Wheelchair  Does patient have a history of Outpatient Therapy (PT/OT)?: No  Does the patient have a history of Short-Term Rehab?: No  Does patient have a history of HHC?: Yes (PT cannot remember name)  Does patient currently have [facility]?: No         Patient Information Continued  Income Source: SSI/SSD  Does patient have prescription coverage?: Yes  Within the past 12 months, you worried that your food would run out before you got the money to buy more : Never true  Within the past 12 months, the food you bought just didn't last and you didn't have money to get more : Never true  Food insecurity resource given?: N/A  Does patient receive dialysis treatments?: No  Does patient have a history of substance abuse?: Yes  Historical substance use preference: Alcohol/ETOH  Does patient have a history of Mental Health Diagnosis?: No (Patient stated no)    PHQ 2/9 Screening   Reviewed PHQ 2/9 Depression Screening Score?: No    Means of Transportation  Means of Transport to Appts[de-identified] Family transport  In the past 12 months, has lack of transportation kept you from medical appointments or from getting medications?: No  In the past 12 months, has lack of transportation kept you from meetings, work, or from getting things needed for daily living?: No  Was application for public transport provided?: N/A        DISCHARGE DETAILS:    Discharge planning discussed with[de-identified] Patient at bedside  Freedom of Choice: Yes  Comments - Freedom of Choice: CM discussed freedom of choice as it pertains to discharge planning    Patient is agreeable to SNF  CM contacted family/caregiver?: No- see comments (declined)  Were Treatment Team discharge recommendations reviewed with patient/caregiver?: Yes  Did patient/caregiver verbalize understanding of patient care needs?: Yes            Requested [address]         Is the patient interested in [facility] at discharge?: No    DME Referral Provided  Referral made for DME?: No    Other Referral/Resources/Interventions Provided:  Interventions: SNF, Acute Rehab  Referral Comments: CM sent referral for SNF/[facility]         Treatment Team Recommendation: SNF, Short Term Rehab, Acute Rehab  Discharge Destination Plan[de-identified] SNF, Acute Rehab, Short Term Rehab  Transport at Discharge : Wheelchair Herminio Lopes

## 2023-03-03 NOTE — TELEMEDICINE
TeleConsultation - [address] 59 y o  male MRN: [id number]  Unit/Bed#: ED 10 Encounter: [id number]        REQUIRED DOCUMENTATION:     1  This service was provided via Telemedicine  2  Provider located at [facility]   3  TeleMed provider: Trisha Epps MD   4  Identify all parties in room with patient during tele consult:  pt  5  Patient was then informed that this was a Telemedicine visit and that the exam was being conducted confidentially over secure lines  My office door was closed  No one else was in the room  Patient acknowledged consent and understanding of privacy and security of the Telemedicine visit, and gave us permission to have the assistant stay in the room in order to assist with the history and to conduct the exam   I informed the patient that I have reviewed their record in Epic and presented the opportunity for them to ask any questions regarding the visit today  The patient agreed to participate  Assessment/Plan     Active Problems:    * No active hospital problems  *    Assessment:    Unspecified mood disorder; rule out major depression    Treatment Plan:    The patient states he remains at risk for suicide and states he believes he needs inpatient psychiatric treatment for his safety  Upon medical clearance, inpatient psychiatric treatment is indicated for provision of precautions, further diagnostic evaluation and treatment stabilization  The patient is in agreement with this plan and is appropriate to sign 201 voluntary paperwork  Continue current precautions  In the meantime consider starting Lexapro 10 mg p o  every day for depression with anxiety  Consider Remeron 7 5 mg p o  nightly for insomnia and as appetite stimulant  Consider hydroxyzine 25 mg p o  every 6 hours as needed anxiety  The patient has informed consent for these medications  Reconsult psychiatry as needed      Current Medications:         Risks / Benefits of Treatment:    Risks, benefits, and possible side effects of medications explained to patient and patient verbalizes understanding  Other treatment modalities ordered as indicated:    · Inpatient psychiatric treatment      Consult to Psychiatry  Consult performed by: Isamar Saucedo MD  Consult ordered by: Caroline Hampton DO        Physician Requesting Consult: Caroline Hampton DO  Principal Problem:<principal problem not specified>    Reason for Consult: Suicidal ideation      History of Present Illness      Patient is a 59 y o  male who presented to the emergency department where the physician has documented the following:  Patient is a 24-year-old male past medical history of hypertension, hyperlipidemia, diabetes, CKD stage III, GERD, thyroid cancer, peripheral artery disease, alcohol abuse presenting with suicidal ideation, failure to thrive  Patient states that roughly 1 month ago he fell down stairs and broke his right knee  States he may have had head trauma at that time however has had multiple falls since then but denies any head trauma and states he does not believe he sustained any injuries in those falls last 3 to 4 days ago  He states that he has difficulty getting himself up and that his wife cannot get him up  He states that he often is not able to get to the bathroom in time and has accidents  He states he cannot care for himself at home because of his injury and his wife cannot either  Notes decreased p o  intake as well as intermittent vomiting and dry heaving in the morning, and diarrhea for the past several days  He notes suicidal ideation for the past several weeks with plan to shoot himself but states that his wife locked up his firearms  Denies any history of suicide attempts or suicidal ideation, does not take any medicines for psychiatric diseases has never been hospitalized in a psychiatric institution and denies any current drug or alcohol use or auditory visual hallucinations    Does note decreased p o  intake in the last several weeks  Denies any fevers, shortness of breath, rashes, vision changes, dysuria but does note lightheadedness as well as chest discomfort last week               Prior to Admission Medications   Prescriptions Last Dose Informant Patient Reported? Taking? BD PEN NEEDLE DEV U/F 32G X 4 MM MISC     No No   Sig: Inject under the skin 4 (four) times a day   Blood Glucose Monitoring Suppl (ONE TOUCH ULTRA 2) w/Device KIT     No No   Sig: by Does not apply route 2 (two) times a day   Blood Glucose Monitoring Suppl (OneTouch Verio Reflect) w/Device KIT     No No   Sig: Check blood sugars twice daily  Please substitute with appropriate alternative as covered by patient's insurance  Dx: E11 65   Insulin Glargine Solostar (Lantus SoloStar) 100 UNIT/ML SOPN     No No   Sig: Inject 45 units bid   OneTouch Delica Lancets 66H MISC     No No   Sig: Check blood sugars twice daily  Please substitute with appropriate alternative as covered by patient's insurance  Dx: E11 65   aspirin 81 mg chewable tablet     No No   Sig: Chew 1 tablet (81 mg total) daily   atorvastatin (LIPITOR) 40 mg tablet     No No   Sig: Take 1 tablet (40 mg total) by mouth daily with dinner   clopidogrel (PLAVIX) 75 mg tablet     No No   Sig: Take 1 tablet (75 mg total) by mouth daily   docusate sodium (COLACE) 100 mg capsule     No No   Sig: Take 1 capsule (100 mg total) by mouth 2 (two) times a day   glucose blood (OneTouch Verio) test strip     No No   Sig: Check blood sugars twice daily  Please substitute with appropriate alternative as covered by patient's insurance   Dx: E11 65   insulin glulisine (Apidra) 100 units/mL injection     No No   Sig: Inject 30 units tid with meals   irbesartan-hydrochlorothiazide (AVALIDE) 150-12 5 MG per tablet     No No   Sig: TAKE 1 TABLET DAILY   levothyroxine 150 mcg tablet     No No   Sig: Take 1 tablet (150 mcg total) by mouth daily   naltrexone (REVIA) 50 mg tablet     No No   Sig: Take 1 tablet (50 mg total) by mouth daily   omeprazole (PriLOSEC) 40 MG capsule     No No   Sig: TAKE 1 CAPSULE DAILY   polyethylene glycol (MIRALAX) 17 g packet     No No   Sig: Take 17 g by mouth daily   senna (SENOKOT) 8 6 MG tablet     No No   Sig: Take 1 tablet (8 6 mg total) by mouth daily   traMADol (Ultram) 50 mg tablet     No No   Sig: Take 1 tablet (50 mg total) by mouth every 6 (six) hours as needed for moderate pain   verapamil (VERELAN PM) 360 MG 24 hr capsule     No No   Sig: TAKE 1 CAPSULE DAILY AT BEDTIME   zolpidem (AMBIEN) 10 mg tablet     No No   Sig: Take 1 tablet (10 mg total) by mouth daily at bedtime as needed for sleep      Facility-Administered Medications: None         Medical History        Past Medical History:   Diagnosis Date   • Broken internal right knee prosthesis ([facility] ) 01/2023   • Chronic kidney disease     • Colon polyp     • Diabetes mellitus (HCC)     • Hyperlipidemia     • Hypertension     • Thyroid cancer [facility])              Surgical History         Past Surgical History:   Procedure Laterality Date   • COLONOSCOPY       • FOOT AMPUTATION Right     • FOOT SURGERY Right 2014   • IR AORTAGRAM WITH RUN-OFF   08/06/2020   • IR TUNNELED CENTRAL LINE PLACEMENT   09/08/2020   • IR TUNNELED CENTRAL LINE REMOVAL   09/28/2020   • SC AMPUTATION FOOT TRANSMETARSAL Right 09/03/2020     Procedure: AMPUTATION TRANSMETATARSAL (TMA);  Surgeon: Rolanda Duncan DPM;  Location: [facility];  Service: Podiatry   • SC THYROIDECTOMY TOTAL/COMPLETE N/A 02/03/2021     Procedure: TOTAL THYROIDECTOMY WITH NIMS MONITORING;  Surgeon: Og Gardiner MD;  Location: [facility];  Service: ENT   • TOE AMPUTATION Right 08/11/2020     Procedure: AMPUTATION TOE;  Surgeon: Rolanda Duncan DPM;  Location: [facility];  Service: Podiatry   • US GUIDED THYROID BIOPSY   07/02/2020            Family History         Family History   Problem Relation Age of Onset   • Cancer Mother     • Hypertension Father           I have reviewed and agree with the history as documented            E-Cigarette/Vaping   • E-Cigarette Use Never User              E-Cigarette/Vaping Substances   • Nicotine No     • THC No     • CBD No     • Flavoring No     • Other No     • Unknown No        Social History            Tobacco Use   • Smoking status: Never   • Smokeless tobacco: Never   Vaping Use   • Vaping Use: Never used   Substance Use Topics   • Alcohol use: Not Currently       Alcohol/week: 8 0 standard drinks       Types: 8 Cans of beer per week       Comment: last drink was 10 days ago   • Drug use: Yes       Types: Marijuana       Comment: occassionally         Review of Systems   All other systems reviewed and are negative  The patient states that due to his falls and loss of independence and fears regarding ambulation, he has become very depressed with suicidal ideation over the last couple of weeks  He states his plan has been to suicide with a gun  He states he can use a gun history but his wife has hidden from him currently  He complains of sleep disruption with decreased appetite greatly decreased energy  He is experiencing anhedonia  He feels very sluggish  Past psychiatric history: Unremarkable    Social history: Patient lives at home with his wife states that he was located near  He has adult child who lives elsewhere  Patient  He reports no abuse  Family history: Unremarkable    Substance use history: Patient admits to smoking marijuana poorly quantitates  He states he used to drink alcohol up until about 5 weeks ago  Mental status examination: The patient is alert and well oriented in all spheres  Affect is very depressed  He is crying at intervals through the assessment complaining feeling extremely depressed and anxious with overwhelming feelings of hopelessness and helplessness  Speech was unremarkable  Sensorium is clear  Thought process is logical and linear  Thought content is reality based  Associations are tight  Memory is intact in all spheres  He appears to be of average intelligence such as vocabulary, general fund of knowledge, sentence structure and syntax  He was to suicidal ideation with plan as above  He denies homicidal ideation  He denies hallucinations and other psychotic features  Insight and judgment have become impaired our extent of his depression and anxiety with overwhelming feelings of hopelessness and helplessness  He does however recognize need for inpatient psychiatric treatment for his safety and treatment stabilization  Past Medical History:   Diagnosis Date   • Broken internal right knee prosthesis ([facility] ) 01/2023   • Chronic kidney disease    • Colon polyp    • Diabetes mellitus ([facility] )    • Hyperlipidemia    • Hypertension    • Thyroid cancer [facility])        Medical Review Of Systems:    Review of Systems    Meds/Allergies     all current active meds have been reviewed  Allergies   Allergen Reactions   • Pollen Extract Eye Swelling     Eyes get watery        Objective     Vital signs in last 24 hours:  Temp:  [97 7 °F (36 5 °C)] 97 7 °F (36 5 °C)  HR:  [69-71] 71  Resp:  [19-36] 36  BP: (101-128)/(60-75) 128/75    No intake or output data in the 24 hours ending 03/03/23 1548        Lab Results: I have personally reviewed all pertinent laboratory/tests results  Imaging Studies: XR chest 2 views    Result Date: 3/3/2023  Narrative: CHEST INDICATION:   diminished L lung sounds  COMPARISON:  11/15/2018 EXAM PERFORMED/VIEWS:  XR CHEST PA & LATERAL FINDINGS: Marked patient rotation  Cardiomediastinal silhouette appears unremarkable  The lungs are clear  No pneumothorax or pleural effusion  Osseous structures appear within normal limits for patient age  Impression: No acute cardiopulmonary disease   Workstation performed: [id number]     XR knee 1 or 2 vw right    Result Date: 2/24/2023  Narrative: RIGHT KNEE INDICATION:   S82 141A: Displaced bicondylar fracture of right tibia, initial encounter for closed fracture  COMPARISON:  1/31/2023 VIEWS:  XR KNEE 1 OR 2 VW RIGHT FINDINGS: Reidentified right tibial plateau comminuted fracture with unchanged varus angulation  Fracture lucencies are slightly wider  Bridging callus is not yet present  Persistent joint effusion  No significant degenerative changes  No lytic or blastic osseous lesion  Reidentified popliteal stent  Impression: Reidentified right tibial plateau comminuted fracture with unchanged varus angulation  Fracture lucencies are slightly wider  Bridging callus is not yet present  Workstation performed: [id number]     EKG/Pathology/Other Studies:   Lab Results   Component Value Date    VENTRATE 74 03/03/2023    ATRIALRATE 74 03/03/2023    PRINT 174 03/03/2023    QRSDINT 96 03/03/2023    QTINT 484 03/03/2023    QTCINT 537 03/03/2023    PAXIS 26 03/03/2023    QRSAXIS -28 03/03/2023    TWAVEAXIS -29 03/03/2023        Code Status: Prior  Advance Directive and Living Will:      Power of :    POLST:      Counseling / Coordination of Care: Total floor / unit time spent today 30 minutes  Greater than 50% of total time was spent with the patient and / or family counseling and / or coordination of care  A description of the counseling / coordination of care: Chart review, patient admission, coordination communication with staff, nursing and provider

## 2023-03-03 NOTE — PLAN OF CARE
Problem: OCCUPATIONAL THERAPY ADULT  Goal: Performs self-care activities at highest level of function for planned discharge setting  See evaluation for individualized goals  Description: Treatment Interventions: ADL retraining, Functional transfer training, UE strengthening/ROM, Endurance training, Patient/family training, Equipment evaluation/education, Fine motor coordination activities, Compensatory technique education, Activityengagement          See flowsheet documentation for full assessment, interventions and recommendations  Note: Limitation: Decreased ADL status, Decreased UE ROM, Decreased UE strength, Decreased endurance, Decreased sensation, Decreased fine motor control, Decreased self-care trans, Decreased high-level ADLs, Mood limitation  Prognosis: Good  Assessment: Patient is a 59 y o  male seen for OT evaluation s/p admit to [address] [place] [address]  on 3/3/2023 w/ failure to thrive  Commorbidities affecting patient's functional performance at time of assessment include: hypertension, hyperlipidemia, diabetes, CKD stage III, GERD, thyroid cancer, peripheral artery disease, and alcohol abuse   Orders placed for OT evaluation and treatment  Prior to admission, Patient requires assistance with ADLs/IADLs, ambulatory with wheelchair and lives with wife in a one level house  Personal factors affecting patient at time of initial evaluation include: steps to enter, difficulty performing ADLs and difficulty performing IADLs  Upon evaluation, patient requires minimal  and moderate assist for UB ADLs, maximal assist for LB ADLs  Patient is oriented x 4    Occupational performance is affected by the following deficits: limited active ROM, decreased muscle strength, impaired gross motor coordination, impaired fine motor coordination, dynamic sit/ stand balance deficit with poor standing tolerance time for self care and functional mobility, decreased activity tolerance, impaired sensation, increased pain, delayed righting and equilibrium reactions, poor trunk control and poor head control  Patient to benefit from continued Occupational Therapy treatment while in the hospital to address deficits as defined above and maximize level of functional independence with ADLs and functional mobility  Occupational Performance areas to address include: eating, grooming , bathing/ shower, dressing, toilet hygiene, transfer to all surfaces and functional ambulation  From OT standpoint, recommendation at time of d/c would be Post acute rehabilitation services       OT Discharge Recommendation: Post acute rehabilitation services     [facility] OTD, OTR/L

## 2023-03-03 NOTE — OCCUPATIONAL THERAPY NOTE
Occupational Therapy Evaluation      Eros Garduno    3/3/2023    Patient Active Problem List   Diagnosis    Hypertension, essential    Gastroesophageal reflux disease without esophagitis    Hyperlipidemia    History of nonadherence to medical treatment    Type 2 diabetes mellitus with diabetic polyneuropathy, with long-term current use of insulin (HCC)    Right-sided tinnitus    Colon polyps    Elevated liver enzymes    Health maintenance examination    PAD (peripheral artery disease) (HCC)    Thyroid cancer ([facility] )    Urinary retention    Hypomagnesemia    Anemia    Abnormal EKG    Insomnia    Persistent proteinuria    Acquired absence of right foot ([facility] )    Acquired hypothyroidism    Physical deconditioning    Type 2 diabetes mellitus with diabetic peripheral angiopathy without gangrene ([facility] )    Dupuytren's contracture of right hand    Stage 3b chronic kidney disease ([facility] )    Hyperkalemia    Alcoholism ([facility] )    Closed fracture of right tibial plateau    Obesity, Class I, BMI 30 0-34 9 (see actual BMI)    MARKELL (acute kidney injury) ([facility] )    Chronic kidney disease-mineral and bone disorder    Acquired genu varum of right lower extremity       Past Medical History:   Diagnosis Date    Broken internal right knee prosthesis ([facility] ) 01/2023    Chronic kidney disease     Colon polyp     Diabetes mellitus ([facility] )     Hyperlipidemia     Hypertension     Thyroid cancer ([facility] )        Past Surgical History:   Procedure Laterality Date    COLONOSCOPY      FOOT AMPUTATION Right     FOOT SURGERY Right 2014    IR AORTAGRAM WITH RUN-OFF  08/06/2020    IR TUNNELED CENTRAL LINE PLACEMENT  09/08/2020    IR TUNNELED CENTRAL LINE REMOVAL  09/28/2020    DE AMPUTATION FOOT TRANSMETARSAL Right 09/03/2020    Procedure: AMPUTATION TRANSMETATARSAL (TMA);  Surgeon: Rolanda Duncan DPM;  Location: [facility];  Service: Podiatry    DE THYROIDECTOMY TOTAL/COMPLETE N/A 02/03/2021    Procedure: TOTAL THYROIDECTOMY WITH NIMS MONITORING;  Surgeon: Sergei Reed MD;  Location: [facility];  Service: ENT    TOE AMPUTATION Right 08/11/2020    Procedure: AMPUTATION TOE;  Surgeon: Feroz Reardon DPM;  Location: [facility];  Service: [address] [facility] [address] THYROID BIOPSY  07/02/2020 03/03/23 1301   OT Last Visit   OT Visit Date 03/03/23   Note Type   Note type Evaluation   Additional Comments Pt agreeable to OT eval  Upon arrival pt supine in bed  Pain Assessment   Pain Assessment Tool 0-10   Pain Score 6   Pain Location/Orientation Orientation: Right;Location: Leg;Location: Knee   Pain Onset/Description Onset: Ongoing;Frequency: Constant/Continuous; Descriptor: Discomfort; Descriptor: Aching;Descriptor: [facility] Pain Intervention(s) Ambulation/increased activity;Repositioned;Medication (See MAR); Emotional support   Restrictions/Precautions   Weight Bearing Precautions Per Order Yes   RLE Weight Bearing Per Order NWB   Braces or Orthoses Knee brace  (donned to RLE prior to arrival)   Other Precautions Multiple lines;Telemetry; Fall Risk;Pain;WBS;1:1   Home Living   Type of [address] One level;Performs ADLs on one level; Able to live on main level with bedroom/bathroom;Stairs to enter with rails  (3-4 DANIEL)   Bathroom Shower/Tub   (has been sponge bathing since january)   [address]  (pt has been utilizing wheelchair in house and community)   Additional Comments Pt reports he has been mostly bedbound the past few days d/t increased weakness   Prior Function   Level of [place] Needs assistance with functional mobility; Needs assistance with ADLs; Needs assistance with IADLS   Lives With Spouse   Receives Help From Family   IADLs Family/Friend/Other provides transportation; Family/Friend/Other provides meals; Family/Friend/Other provides medication management   Falls in the last 6 months 5 to 10   Vocational On disability Lifestyle   Autonomy Patient requires assistance with ADLs/IADLs, ambulatory with wheelchair and lives with wife in a one level house   Reciprocal Relationships Wife   Service to Others On disability   ADL   Eating Assistance 5  Supervision/Setup   Grooming Assistance 5  Supervision/Setup   UB Bathing Assistance 4  Minimal Assistance   LB Bathing Assistance 2  Maximal Assistance   [address] 3  Moderate Assistance    [facility] 2  Maximal [address]  2  Maximal Assistance   Bed Mobility   Supine to Sit 4  Minimal assistance  (Pt only able to achieve long-sitting position in bed )   Additional items Assist x 2;HOB elevated; Bedrails; Increased time required;Verbal cues   Additional Comments Pt completed long-sit position in bed with min assist of 2  Upon sitting pt reported (+) dizziness and nausea  Symptoms persisted despite increased rest  Pt declined dangling at EOB d/t pain, fatgiue, and weakness  Pt tolerated long-sitting for ~3 minutes  While sitting, pt noted to have flexed trunk with forward head positioning  At end of session, pt repositioned in bed with assist of 2  Activity Tolerance   Activity Tolerance Patient limited by pain; Patient limited by fatigue   Medical Staff Made Aware Pt seen as a co-eval with PT Macy Stevenson due to the patient's co-morbidities, clinically unstable presentation, and present impairments which are a regression from the patient's baseline  RUE Assessment   RUE Assessment X  (~90 degree shoulder flex; 3/5 MMT distally)   LUE Assessment   LUE Assessment X  (~90 degree shoulder flex; 3/5 MMT distally)   Hand Function   Gross Motor Coordination Impaired   Fine Motor Coordination Impaired   Sensation   Light Touch Partial deficits in the RLE  (numbness in R foot reported)   Vision-Basic Assessment   Current Vision Wears glasses all the time   Cognition   Overall Cognitive Status [facility]   Arousal/Participation Alert; Responsive   Attention Within functional limits   Orientation Level Oriented X4   Memory Within functional limits   Following Commands Follows all commands and directions without difficulty   Assessment   Limitation Decreased ADL status; Decreased UE ROM; Decreased UE strength;Decreased endurance;Decreased sensation;Decreased fine motor control;Decreased self-care trans;Decreased high-level ADLs;Mood limitation   Prognosis Good   Assessment Patient is a 59 y o  male seen for OT evaluation s/p admit to [address] [facility]  on 3/3/2023 w/ failure to thrive  Commorbidities affecting patient's functional performance at time of assessment include: hypertension, hyperlipidemia, diabetes, CKD stage III, GERD, thyroid cancer, peripheral artery disease, and alcohol abuse   Orders placed for OT evaluation and treatment  Prior to admission, Patient requires assistance with ADLs/IADLs, ambulatory with wheelchair and lives with wife in a one level house  Personal factors affecting patient at time of initial evaluation include: steps to enter, difficulty performing ADLs and difficulty performing IADLs  Upon evaluation, patient requires minimal  and moderate assist for UB ADLs, maximal assist for LB ADLs  Patient is oriented x 4  Occupational performance is affected by the following deficits: limited active ROM, decreased muscle strength, impaired gross motor coordination, impaired fine motor coordination, dynamic sit/ stand balance deficit with poor standing tolerance time for self care and functional mobility, decreased activity tolerance, impaired sensation, increased pain, delayed righting and equilibrium reactions, poor trunk control and poor head control  Patient to benefit from continued Occupational Therapy treatment while in the hospital to address deficits as defined above and maximize level of functional independence with ADLs and functional mobility   Occupational Performance areas to address include: eating, grooming , bathing/ shower, dressing, toilet hygiene, transfer to all surfaces and functional ambulation  From OT standpoint, recommendation at time of d/c would be Post acute rehabilitation services  Goals   Patient Goals to be able to care for self   Plan   Treatment Interventions ADL retraining;Functional transfer training;UE strengthening/ROM; Endurance training;Patient/family training;Equipment evaluation/education; Fine motor coordination activities; Compensatory technique education; Activityengagement   Goal Expiration Date 03/18/23   OT Treatment Day 0   OT Frequency 3-5x/wk   Recommendation   OT Discharge Recommendation Post acute rehabilitation services   Additional Comments  The patient's raw score on the AM-PAC Daily Activity Inpatient Short Form is 11  A raw score of less than 19 suggests the patient may benefit from discharge to post-acute rehabilitation services  Please refer to the recommendation of the Occupational Therapist for safe discharge planning  AM-PAC Daily Activity Inpatient   Lower Body Dressing 1   Bathing 1   Toileting 1   Upper Body Dressing 2   Grooming 3   Eating 3   Daily Activity Raw Score 11   Daily Activity Standardized Score (Calc for Raw Score >=11) 29 04   AM-Merged with [facility] Applied Cognition Inpatient   Following a Speech/Presentation 4   Understanding Ordinary Conversation 4   Taking Medications 4   Remembering Where Things Are Placed or Put Away 4   Remembering List of 4-5 Errands 4   Taking Care of Complicated Tasks 4   Applied Cognition Raw Score 24   Applied Cognition Standardized Score 62 21   Modified [place]i Scale   Modified [place] Scale 5   End of Consult   Patient Position at End of Consult Supine; All needs within reach  (1:1 present)   Nurse Communication Nurse aware of consult     GOALS:    *ADL transfers with Min (A) for inc'd independence with ADLs/purposeful tasks    *UB ADL with (S) for inc'd independence with self cares    *LB ADL with Min (A) using AE prn for inc'd independence with self cares    *Toileting with Min (A) for clothing management and hygiene for return to PLOF with personal care    *Participate in 10m UE therex to increase overall stamina/activity tolerance for purposeful tasks    *Bed mobility- (S) for inc'd independence to manage own comfort and initiate EOB & OOB purposeful tasks    *Patient will verbalize 3 safety awareness/ principles to prevent falls in the home setting  *Patient will increase OOB/sitting tolerance to 2-4 hours per day to increase participation in self-care and leisure tasks with no s/s of exertion        *Pt will demonstrate use of long handled AE during 100% of tx sessions for increased ADL safety and independence following D/C     Padmini Kothari, OTD, OTR/L

## 2023-03-04 ENCOUNTER — APPOINTMENT (INPATIENT)
Dept: ULTRASOUND IMAGING | Facility: HOSPITAL | Age: 58
End: 2023-03-04

## 2023-03-04 LAB
ANION GAP SERPL CALCULATED.3IONS-SCNC: 10 MMOL/L (ref 4–13)
ANION GAP SERPL CALCULATED.3IONS-SCNC: 11 MMOL/L (ref 4–13)
ANION GAP SERPL CALCULATED.3IONS-SCNC: 15 MMOL/L (ref 4–13)
ARTERIAL PATENCY WRIST A: YES
BACTERIA UR QL AUTO: ABNORMAL /HPF
BASE EX.OXY STD BLDV CALC-SCNC: 88.9 % (ref 60–80)
BASE EXCESS BLDV CALC-SCNC: -1.6 MMOL/L
BASOPHILS # BLD AUTO: 0.05 THOUSANDS/ÂΜL (ref 0–0.1)
BASOPHILS NFR BLD AUTO: 1 % (ref 0–1)
BILIRUB UR QL STRIP: NEGATIVE
BUN SERPL-MCNC: 39 MG/DL (ref 5–25)
BUN SERPL-MCNC: 42 MG/DL (ref 5–25)
BUN SERPL-MCNC: 50 MG/DL (ref 5–25)
CALCIUM SERPL-MCNC: 8.6 MG/DL (ref 8.4–10.2)
CALCIUM SERPL-MCNC: 8.7 MG/DL (ref 8.4–10.2)
CALCIUM SERPL-MCNC: 8.9 MG/DL (ref 8.4–10.2)
CAOX CRY URNS QL MICRO: ABNORMAL /HPF
CHLORIDE SERPL-SCNC: 96 MMOL/L (ref 96–108)
CHLORIDE SERPL-SCNC: 98 MMOL/L (ref 96–108)
CHLORIDE SERPL-SCNC: 98 MMOL/L (ref 96–108)
CLARITY UR: CLEAR
CO2 SERPL-SCNC: 20 MMOL/L (ref 21–32)
CO2 SERPL-SCNC: 22 MMOL/L (ref 21–32)
CO2 SERPL-SCNC: 22 MMOL/L (ref 21–32)
COLOR UR: ABNORMAL
CREAT SERPL-MCNC: 1.42 MG/DL (ref 0.6–1.3)
CREAT SERPL-MCNC: 1.49 MG/DL (ref 0.6–1.3)
CREAT SERPL-MCNC: 1.86 MG/DL (ref 0.6–1.3)
EOSINOPHIL # BLD AUTO: 0.14 THOUSAND/ÂΜL (ref 0–0.61)
EOSINOPHIL NFR BLD AUTO: 2 % (ref 0–6)
ERYTHROCYTE [DISTWIDTH] IN BLOOD BY AUTOMATED COUNT: 12.4 % (ref 11.6–15.1)
GFR SERPL CREATININE-BSD FRML MDRD: 38 ML/MIN/1.73SQ M
GFR SERPL CREATININE-BSD FRML MDRD: 50 ML/MIN/1.73SQ M
GFR SERPL CREATININE-BSD FRML MDRD: 53 ML/MIN/1.73SQ M
GLUCOSE SERPL-MCNC: 143 MG/DL (ref 65–140)
GLUCOSE SERPL-MCNC: 148 MG/DL (ref 65–140)
GLUCOSE SERPL-MCNC: 153 MG/DL (ref 65–140)
GLUCOSE SERPL-MCNC: 156 MG/DL (ref 65–140)
GLUCOSE SERPL-MCNC: 158 MG/DL (ref 65–140)
GLUCOSE SERPL-MCNC: 169 MG/DL (ref 65–140)
GLUCOSE SERPL-MCNC: 171 MG/DL (ref 65–140)
GLUCOSE SERPL-MCNC: 175 MG/DL (ref 65–140)
GLUCOSE SERPL-MCNC: 179 MG/DL (ref 65–140)
GLUCOSE SERPL-MCNC: 186 MG/DL (ref 65–140)
GLUCOSE SERPL-MCNC: 188 MG/DL (ref 65–140)
GLUCOSE SERPL-MCNC: 190 MG/DL (ref 65–140)
GLUCOSE UR STRIP-MCNC: ABNORMAL MG/DL
HCO3 BLDV-SCNC: 21.5 MMOL/L (ref 24–30)
HCT VFR BLD AUTO: 30.6 % (ref 36.5–49.3)
HGB BLD-MCNC: 10.7 G/DL (ref 12–17)
HGB UR QL STRIP.AUTO: NEGATIVE
IMM GRANULOCYTES # BLD AUTO: 0.06 THOUSAND/UL (ref 0–0.2)
IMM GRANULOCYTES NFR BLD AUTO: 1 % (ref 0–2)
KETONES UR STRIP-MCNC: ABNORMAL MG/DL
LEUKOCYTE ESTERASE UR QL STRIP: NEGATIVE
LYMPHOCYTES # BLD AUTO: 1.9 THOUSANDS/ÂΜL (ref 0.6–4.47)
LYMPHOCYTES NFR BLD AUTO: 26 % (ref 14–44)
MCH RBC QN AUTO: 31.7 PG (ref 26.8–34.3)
MCHC RBC AUTO-ENTMCNC: 35 G/DL (ref 31.4–37.4)
MCV RBC AUTO: 91 FL (ref 82–98)
MONOCYTES # BLD AUTO: 0.97 THOUSAND/ÂΜL (ref 0.17–1.22)
MONOCYTES NFR BLD AUTO: 13 % (ref 4–12)
NEUTROPHILS # BLD AUTO: 4.12 THOUSANDS/ÂΜL (ref 1.85–7.62)
NEUTS SEG NFR BLD AUTO: 57 % (ref 43–75)
NITRITE UR QL STRIP: NEGATIVE
NON-SQ EPI CELLS URNS QL MICRO: ABNORMAL /HPF
NRBC BLD AUTO-RTO: 0 /100 WBCS
O2 CT BLDV-SCNC: 15.1 ML/DL
PCO2 BLDV: 31.2 MM HG (ref 42–50)
PH BLDV: 7.46 [PH] (ref 7.3–7.4)
PH UR STRIP.AUTO: 5 [PH]
PLATELET # BLD AUTO: 188 THOUSANDS/UL (ref 149–390)
PMV BLD AUTO: 10.7 FL (ref 8.9–12.7)
PO2 BLDV: 61 MM HG (ref 35–45)
POTASSIUM SERPL-SCNC: 3.6 MMOL/L (ref 3.5–5.3)
PROT UR STRIP-MCNC: NEGATIVE MG/DL
RBC # BLD AUTO: 3.38 MILLION/UL (ref 3.88–5.62)
RBC #/AREA URNS AUTO: ABNORMAL /HPF
SODIUM SERPL-SCNC: 130 MMOL/L (ref 135–147)
SODIUM SERPL-SCNC: 131 MMOL/L (ref 135–147)
SODIUM SERPL-SCNC: 131 MMOL/L (ref 135–147)
SP GR UR STRIP.AUTO: 1.01 (ref 1–1.03)
UROBILINOGEN UR STRIP-ACNC: <2 MG/DL
WBC # BLD AUTO: 7.24 THOUSAND/UL (ref 4.31–10.16)
WBC #/AREA URNS AUTO: ABNORMAL /HPF

## 2023-03-04 PROCEDURE — 0T9B70Z DRAINAGE OF BLADDER WITH DRAINAGE DEVICE, VIA NATURAL OR ARTIFICIAL OPENING: ICD-10-PCS | Performed by: INTERNAL MEDICINE

## 2023-03-04 RX ORDER — TAMSULOSIN HYDROCHLORIDE 0.4 MG/1
0.4 CAPSULE ORAL
Status: DISCONTINUED | OUTPATIENT
Start: 2023-03-04 | End: 2023-03-13 | Stop reason: HOSPADM

## 2023-03-04 RX ORDER — TRAMADOL HYDROCHLORIDE 50 MG/1
50 TABLET ORAL EVERY 6 HOURS PRN
Status: DISCONTINUED | OUTPATIENT
Start: 2023-03-04 | End: 2023-03-13 | Stop reason: HOSPADM

## 2023-03-04 RX ORDER — INSULIN LISPRO 100 [IU]/ML
10 INJECTION, SOLUTION INTRAVENOUS; SUBCUTANEOUS
Status: DISCONTINUED | OUTPATIENT
Start: 2023-03-04 | End: 2023-03-13 | Stop reason: HOSPADM

## 2023-03-04 RX ORDER — INSULIN LISPRO 100 [IU]/ML
1-6 INJECTION, SOLUTION INTRAVENOUS; SUBCUTANEOUS
Status: DISCONTINUED | OUTPATIENT
Start: 2023-03-04 | End: 2023-03-13 | Stop reason: HOSPADM

## 2023-03-04 RX ORDER — ACETAMINOPHEN 325 MG/1
975 TABLET ORAL EVERY 8 HOURS PRN
Status: DISCONTINUED | OUTPATIENT
Start: 2023-03-04 | End: 2023-03-13 | Stop reason: HOSPADM

## 2023-03-04 RX ORDER — INSULIN GLARGINE 100 [IU]/ML
25 INJECTION, SOLUTION SUBCUTANEOUS EVERY 12 HOURS SCHEDULED
Status: DISCONTINUED | OUTPATIENT
Start: 2023-03-04 | End: 2023-03-13 | Stop reason: HOSPADM

## 2023-03-04 RX ADMIN — ASPIRIN 81 MG 81 MG: 81 TABLET ORAL at 09:22

## 2023-03-04 RX ADMIN — INSULIN LISPRO 1 UNITS: 100 INJECTION, SOLUTION INTRAVENOUS; SUBCUTANEOUS at 12:02

## 2023-03-04 RX ADMIN — PANTOPRAZOLE SODIUM 40 MG: 40 TABLET, DELAYED RELEASE ORAL at 07:31

## 2023-03-04 RX ADMIN — INSULIN LISPRO 10 UNITS: 100 INJECTION, SOLUTION INTRAVENOUS; SUBCUTANEOUS at 12:02

## 2023-03-04 RX ADMIN — INSULIN LISPRO 1 UNITS: 100 INJECTION, SOLUTION INTRAVENOUS; SUBCUTANEOUS at 16:58

## 2023-03-04 RX ADMIN — VERAPAMIL HYDROCHLORIDE 360 MG: 180 TABLET ORAL at 09:22

## 2023-03-04 RX ADMIN — SODIUM CHLORIDE 150 ML/HR: 0.9 INJECTION, SOLUTION INTRAVENOUS at 10:09

## 2023-03-04 RX ADMIN — ACETAMINOPHEN 975 MG: 325 TABLET, FILM COATED ORAL at 15:26

## 2023-03-04 RX ADMIN — ATORVASTATIN CALCIUM 40 MG: 40 TABLET, FILM COATED ORAL at 16:26

## 2023-03-04 RX ADMIN — LEVOTHYROXINE SODIUM 150 MCG: 150 TABLET ORAL at 09:22

## 2023-03-04 RX ADMIN — INSULIN LISPRO 10 UNITS: 100 INJECTION, SOLUTION INTRAVENOUS; SUBCUTANEOUS at 16:59

## 2023-03-04 RX ADMIN — MIRTAZAPINE 7.5 MG: 15 TABLET, FILM COATED ORAL at 21:56

## 2023-03-04 RX ADMIN — TAMSULOSIN HYDROCHLORIDE 0.4 MG: 0.4 CAPSULE ORAL at 09:23

## 2023-03-04 RX ADMIN — INSULIN GLARGINE 25 UNITS: 100 INJECTION, SOLUTION SUBCUTANEOUS at 21:56

## 2023-03-04 RX ADMIN — INSULIN GLARGINE 25 UNITS: 100 INJECTION, SOLUTION SUBCUTANEOUS at 12:01

## 2023-03-04 RX ADMIN — ESCITALOPRAM OXALATE 10 MG: 10 TABLET ORAL at 09:23

## 2023-03-04 RX ADMIN — CLOPIDOGREL BISULFATE 75 MG: 75 TABLET ORAL at 09:23

## 2023-03-04 NOTE — ASSESSMENT & PLAN NOTE
· Suspect in the setting of dehydration, poor oral intake, GI losses also possible obstruction  · S/p IV hydration  · Monitor BMP, creatinine improving  · Noted urinary retention on bladder scan requiring cath  ultrasound kidney/bladder negative for obstruction  Started Flomax  · Holding home irbesartan/hctz  · Mae catheter inserted 3/4   Will try voiding trial prior to discharge

## 2023-03-04 NOTE — ASSESSMENT & PLAN NOTE
· Mentioned SI however now denying any more SI   · Evaluated by Psychiatry in the ED, recommending inpatient psych admission   Patient agreeable  · Started on lexapro 10mg, remeron 7 5mg, atarax 25mg q6h PRN  · Continue 1:1 observation  · Will ask psych to re evaluate patient for disposition

## 2023-03-04 NOTE — ASSESSMENT & PLAN NOTE
Lab Results   Component Value Date    HGBA1C 11 1 (H) 11/14/2022       Recent Labs     03/04/23  0557 03/04/23  0802 03/04/23  1003 03/04/23  1200   POCGLU 171* 156* 190* 169*       Blood Sugar Average: Last 72 hrs:  (P) 198     · Gap closed    · Transition to subcutaneous basal/bolus insulin, stop insulin infusion 2 hours after initiation of Lantus  · Insulin sliding scale with Accu-Cheks  · Diabetic diet

## 2023-03-04 NOTE — ASSESSMENT & PLAN NOTE
Lab Results   Component Value Date    HGBA1C 11 1 (H) 11/14/2022       Recent Labs     03/04/23  0557 03/04/23  0802 03/04/23  1003 03/04/23  1200   POCGLU 171* 156* 190* 169*       Blood Sugar Average: Last 72 hrs:  (P) 198     · Gap closed    · Transitioned to subcutaneous basal/bolus insulin  · Insulin sliding scale with Accu-Cheks  · Diabetic diet

## 2023-03-04 NOTE — PLAN OF CARE
Problem: Potential for Falls  Goal: Patient will remain free of falls  Description: INTERVENTIONS:  - Educate patient/family on patient safety including physical limitations  - Instruct patient to call for assistance with activity   - Consult OT/PT to assist with strengthening/mobility   - Keep Call bell within reach  - Keep bed low and locked with side rails adjusted as appropriate  - Keep care items and personal belongings within reach  - Initiate and maintain comfort rounds  - Make Fall Risk Sign visible to staff  - Offer Toileting every  Hours, in advance of need  - Initiate/Maintain alarm  - Obtain necessary fall risk management equipment:   - Apply yellow socks and bracelet for high fall risk patients  - Consider moving patient to room near nurses station  Outcome: Progressing     Problem: PAIN - ADULT  Goal: Verbalizes/displays adequate comfort level or baseline comfort level  Description: Interventions:  - Encourage patient to monitor pain and request assistance  - Assess pain using appropriate pain scale  - Administer analgesics based on type and severity of pain and evaluate response  - Implement non-pharmacological measures as appropriate and evaluate response  - Consider cultural and social influences on pain and pain management  - Notify physician/advanced practitioner if interventions unsuccessful or patient reports new pain  Outcome: Progressing     Problem: INFECTION - ADULT  Goal: Absence or prevention of progression during hospitalization  Description: INTERVENTIONS:  - Assess and monitor for signs and symptoms of infection  - Monitor lab/diagnostic results  - Monitor all insertion sites, i e  indwelling lines, tubes, and drains  - Monitor endotracheal if appropriate and nasal secretions for changes in amount and color  - [place] appropriate cooling/warming therapies per order  - Administer medications as ordered  - Instruct and encourage patient and family to use good hand hygiene technique  - Identify and instruct in appropriate isolation precautions for identified infection/condition  Outcome: Progressing  Goal: Absence of fever/infection during neutropenic period  Description: INTERVENTIONS:  - Monitor WBC    Outcome: Progressing     Problem: SAFETY ADULT  Goal: Patient will remain free of falls  Description: INTERVENTIONS:  - Educate patient/family on patient safety including physical limitations  - Instruct patient to call for assistance with activity   - Consult OT/PT to assist with strengthening/mobility   - Keep Call bell within reach  - Keep bed low and locked with side rails adjusted as appropriate  - Keep care items and personal belongings within reach  - Initiate and maintain comfort rounds  - Make Fall Risk Sign visible to staff  - Offer Toileting every Hours, in advance of need  - Initiate/Maintain alarm  - Obtain necessary fall risk management equipment:   - Apply yellow socks and bracelet for high fall risk patients  - Consider moving patient to room near nurses station  Outcome: Progressing  Goal: Maintain or return to baseline ADL function  Description: INTERVENTIONS:  -  Assess patient's ability to carry out ADLs; assess patient's baseline for ADL function and identify physical deficits which impact ability to perform ADLs (bathing, care of mouth/teeth, toileting, grooming, dressing, etc )  - Assess/evaluate cause of self-care deficits   - Assess range of motion  - Assess patient's mobility; develop plan if impaired  - Assess patient's need for assistive devices and provide as appropriate  - Encourage maximum independence but intervene and supervise when necessary  - Involve family in performance of ADLs  - Assess for home care needs following discharge   - Consider OT consult to assist with ADL evaluation and planning for discharge  - Provide patient education as appropriate  Outcome: Progressing  Goal: Maintains/Returns to pre admission functional level  Description: INTERVENTIONS:  - Perform BMAT or MOVE assessment daily    - Set and communicate daily mobility goal to care team and patient/family/caregiver  - Collaborate with rehabilitation services on mobility goals if consulted  - Perform Range of Motion times a day  - Reposition patient every  hours  - Dangle patient  times a day  - Stand patient  times a day  - Ambulate patient  times a day  - Out of bed to chair  times a day   - Out of bed for meals  times a day  - Out of bed for toileting  - Record patient progress and toleration of activity level   Outcome: Progressing     Problem: DISCHARGE PLANNING  Goal: Discharge to home or other facility with appropriate resources  Description: INTERVENTIONS:  - Identify barriers to discharge w/patient and caregiver  - Arrange for needed discharge resources and transportation as appropriate  - Identify discharge learning needs (meds, wound care, etc )  - Arrange for interpretive services to assist at discharge as needed  - Refer to Case Management Department for coordinating discharge planning if the patient needs post-hospital services based on physician/advanced practitioner order or complex needs related to functional status, cognitive ability, or social support system  Outcome: Progressing     Problem: Knowledge Deficit  Goal: Patient/family/caregiver demonstrates understanding of disease process, treatment plan, medications, and discharge instructions  Description: Complete learning assessment and assess knowledge base  Interventions:  - Provide teaching at level of understanding  - Provide teaching via preferred learning methods  Outcome: Progressing     Problem: Nutrition/Hydration-ADULT  Goal: Nutrient/Hydration intake appropriate for improving, restoring or maintaining nutritional needs  Description: Monitor and assess patient's nutrition/hydration status for malnutrition  Collaborate with interdisciplinary team and initiate plan and interventions as ordered    Monitor patient's weight and dietary intake as ordered or per policy  Utilize nutrition screening tool and intervene as necessary  Determine patient's food preferences and provide high-protein, high-caloric foods as appropriate       INTERVENTIONS:  - Monitor oral intake, urinary output, labs, and treatment plans  - Assess nutrition and hydration status and recommend course of action  - Evaluate amount of meals eaten  - Assist patient with eating if necessary   - Allow adequate time for meals  - Recommend/ encourage appropriate diets, oral nutritional supplements, and vitamin/mineral supplements  - Order, calculate, and assess calorie counts as needed  - Recommend, monitor, and adjust tube feedings and TPN/PPN based on assessed needs  - Assess need for intravenous fluids  - Provide specific nutrition/hydration education as appropriate  - Include patient/family/caregiver in decisions related to nutrition  Outcome: Progressing     Problem: MOBILITY - ADULT  Goal: Maintain or return to baseline ADL function  Description: INTERVENTIONS:  -  Assess patient's ability to carry out ADLs; assess patient's baseline for ADL function and identify physical deficits which impact ability to perform ADLs (bathing, care of mouth/teeth, toileting, grooming, dressing, etc )  - Assess/evaluate cause of self-care deficits   - Assess range of motion  - Assess patient's mobility; develop plan if impaired  - Assess patient's need for assistive devices and provide as appropriate  - Encourage maximum independence but intervene and supervise when necessary  - Involve family in performance of ADLs  - Assess for home care needs following discharge   - Consider OT consult to assist with ADL evaluation and planning for discharge  - Provide patient education as appropriate  Outcome: Progressing  Goal: Maintains/Returns to pre admission functional level  Description: INTERVENTIONS:  - Perform BMAT or MOVE assessment daily    - Set and communicate daily mobility goal to care team and patient/family/caregiver  - Collaborate with rehabilitation services on mobility goals if consulted  - Perform Range of Motion  times a day  - Reposition patient every  hours    - Dangle patient  times a day  - Stand patient  times a day  - Ambulate patient  times a day  - Out of bed to chair  times a day   - Out of bed for meals  times a day  - Out of bed for toileting  - Record patient progress and toleration of activity level   Outcome: Progressing     Problem: Prexisting or High Potential for Compromised Skin Integrity  Goal: Skin integrity is maintained or improved  Description: INTERVENTIONS:  - Identify patients at risk for skin breakdown  - Assess and monitor skin integrity  - Assess and monitor nutrition and hydration status  - Monitor labs   - Assess for incontinence   - Turn and reposition patient  - Assist with mobility/ambulation  - Relieve pressure over bony prominences  - Avoid friction and shearing  - Provide appropriate hygiene as needed including keeping skin clean and dry  - Evaluate need for skin moisturizer/barrier cream  - Collaborate with interdisciplinary team   - Patient/family teaching  - Consider wound care consult   Outcome: Progressing

## 2023-03-04 NOTE — ASSESSMENT & PLAN NOTE
· Reports inability to care for self at home, wife also unable to take care of patient  · Reports history of fall with knee injury about a month ago, multiple history of falls, also complaining of weakness, poor oral intake, difficulty controlling erratic sugar levels  · Also mentioned SI  · For placement, seen by PT/OT and recommending rehab, evaluated by Psych while patient is in the ED and recommending inpatient psychiatry admission which the patient is agreeable to

## 2023-03-04 NOTE — ASSESSMENT & PLAN NOTE
Lab Results   Component Value Date    HGBA1C 11 1 (H) 11/14/2022       Recent Labs     03/03/23  1148 03/03/23  1819   POCGLU 288* 308*       Blood Sugar Average: Last 72 hrs:  (P) 298     · With mildly elevated AG, hyperglycemia  (+) betahydroxybuterate  · Consideration for starvation ketosis vs DKA  Patient endorsed hx of nausea/vomiting, diarrhea at home  No fevers   Given hx of poor appetite, has not been taking his insulin  · Maintained on glargine 40u BID, apidra 30u TID with meals  · Will continue insulin drip and continue IV fluids  · Monitor BMP closely, replace electrolytes  · Consult Endocrinology

## 2023-03-04 NOTE — ASSESSMENT & PLAN NOTE
· Suspect in the setting of dehydration, poor oral intake, GI losses  · IV hydration  · Monitor BMP  · Check bladder scan, PVR  · Holding home irbesartan/hctz

## 2023-03-04 NOTE — ASSESSMENT & PLAN NOTE
· Reports inability to care for self at home, wife also unable to take care of patient  · Reports history of fall with knee injury about a month ago, multiple history of falls, also complaining of weakness, poor oral intake, difficulty controlling erratic sugar levels  · For placement, seen by PT/OT and recommending rehab, evaluated by Psych while patient is in the ED and recommending inpatient psychiatry admission which the patient is agreeable to

## 2023-03-04 NOTE — H&P
[facility]  H&P- Eros Garduno 1965, 59 y o  male MRN: [id number]  Unit/Bed#: MS Jerry Encounter: [id number]  Primary Care Provider: Hans Wren MD   Date and time admitted to hospital: 3/3/2023 11:45 AM    * Failure to thrive in adult  Assessment & Plan  · Reports inability to care for self at home, wife also unable to take care of patient  · Reports history of fall with knee injury about a month ago, multiple history of falls, also complaining of weakness, poor oral intake, difficulty controlling erratic sugar levels  · Also mentioned SI  · For placement, seen by PT/OT and recommending rehab, evaluated by Psych while patient is in the ED and recommending inpatient psychiatry admission which the patient is agreeable to    Depression and anxiety  Assessment & Plan  · Mentioned SI  · Evaluated by Psychiatry in the ED, recommending inpatient psych admission  Patient agreeable  · Started on lexapro 10mg, remeron 7 5mg, atarax 25mg q6h PRN  · Continue 1:1 observation    MARKELL (acute kidney injury) ([facility] [address] )  Assessment & Plan  · Suspect in the setting of dehydration, poor oral intake, GI losses  · IV hydration  · Monitor BMP  · Check bladder scan, PVR  · Holding home irbesartan/hctz    Physical deconditioning  Assessment & Plan  · Hx of fall and knee injury last month, unable to take care of self at home  · PT/OT evluation  · CM for placement    HHS vs DKA  Assessment & Plan  Lab Results   Component Value Date    HGBA1C 11 1 (H) 11/14/2022       Recent Labs     03/03/23  1148 03/03/23  1819   POCGLU 288* 308*       Blood Sugar Average: Last 72 hrs:  (P) 298     · With mildly elevated AG, hyperglycemia  Consideration for starvation ketosis vs DKA  Patient endorsed hx of nausea/vomiting, diarrhea at home  No fevers   Given hx of poor appetite, has not been taking his insulin  · Maintained on glargine 40u BID, apidra 30u TID with meals  · Evaluated by ICU, will continue insulin drip and continue IV fluids  · Monitor BMP closely, replace electrolytes  · Consult Endocrinology     Hypertension, essential  Assessment & Plan  · Holding home irberstan/hctz in the setting of MARKELL  · Continue home verapamil    VTE Pharmacologic Prophylaxis: VTE Score: 2 Low Risk (Score 0-2) - Encourage Ambulation  Code Status: Level 1 - Full Code  Discussion with family: Patient declined call to   Anticipated Length of Stay: Patient will be admitted on an inpatient basis with an anticipated length of stay of greater than 2 midnights secondary to above issues  Total Time Spent on Date of Encounter in care of patient: 65 minutes This time was spent on one or more of the following: performing physical exam; counseling and coordination of care; obtaining or reviewing history; documenting in the medical record; reviewing/ordering tests, medications or procedures; communicating with other healthcare professionals and discussing with patient's family/caregivers  Chief Complaint: inability to take care of self at home, psychiatry evaluation    History of Present Illness:  Iza Blair is a 59 y o  male with a PMH of DM on insulin, HTN, history of fall with knee injury who presents with complaints of inability to take care of self at home and psychiatric evaluation  Patient reports history of fall about a month ago with knee injury  Since then, has been having some issues and difficulties with ambulation, reporting multiple episodes of fall at home  Patient also complaining of poor oral intake, on and off nausea and vomiting as well as diarrhea over the past few days  Denies any fevers  Patient has had difficulty controlling his sugars at home, since he is not really been eating he has not been taking his insulin as well  Patient reports his wife is also having hard time taking care of him  Presents to the ED for placement    Patient also mentions that he has been thinking about killing himself  On presentation, he was evaluated by PT/OT and recommending rehab  He was also seen by Psychiatry and was recommending inpatient psych which the patient is agreeable to do  Labs notable for elevated AG, hyperglycemia  Patient with (+) hydroxybutyrate  Evaluated by ICU, appropriate for medical surgical floors  Patient was started on insulin drip and given IV fluids  Review of Systems:  Review of Systems   Constitutional: Positive for appetite change  Negative for chills and fever  HENT: Negative for tinnitus and trouble swallowing  Eyes: Negative for pain and visual disturbance  Respiratory: Negative for cough and shortness of breath  Cardiovascular: Negative for chest pain  Gastrointestinal: Positive for diarrhea, nausea and vomiting  Negative for abdominal pain  Genitourinary: Negative for dysuria and hematuria  Musculoskeletal: Positive for arthralgias and gait problem  Skin: Negative for color change and pallor  Neurological: Negative for dizziness and light-headedness  Hematological: Negative for adenopathy  Psychiatric/Behavioral: Positive for suicidal ideas  The patient is nervous/anxious  All other systems reviewed and are negative        Past Medical and Surgical History:   Past Medical History:   Diagnosis Date   • Broken internal right knee prosthesis ([facility] [address] ) 01/2023   • Chronic kidney disease    • Colon polyp    • Diabetes mellitus ([facility] [address] )    • Hyperlipidemia    • Hypertension    • Thyroid cancer [facility])        Past Surgical History:   Procedure Laterality Date   • COLONOSCOPY     • FOOT AMPUTATION Right    • FOOT SURGERY Right 2014   • IR AORTAGRAM WITH RUN-OFF  08/06/2020   • IR TUNNELED CENTRAL LINE PLACEMENT  09/08/2020   • IR TUNNELED CENTRAL LINE REMOVAL  09/28/2020   • AR AMPUTATION FOOT TRANSMETARSAL Right 09/03/2020    Procedure: AMPUTATION TRANSMETATARSAL (TMA);  Surgeon: Lselie Murillo DPM;  Location: [facility];  Service: Podiatry   • AR THYROIDECTOMY TOTAL/COMPLETE N/A 02/03/2021    Procedure: TOTAL THYROIDECTOMY WITH NIMS MONITORING;  Surgeon: Collins Marquez MD;  Location: [facility];  Service: ENT   • TOE AMPUTATION Right 08/11/2020    Procedure: AMPUTATION TOE;  Surgeon: Arpit Weir DPM;  Location: [facility];  Service: Podiatry   • US GUIDED THYROID BIOPSY  07/02/2020       Meds/Allergies:  Prior to Admission medications    Medication Sig Start Date End Date Taking? Authorizing Provider   aspirin 81 mg chewable tablet Chew 1 tablet (81 mg total) daily 8/13/20 2/14/23  Shira Mo MD   atorvastatin (LIPITOR) 40 mg tablet Take 1 tablet (40 mg total) by mouth daily with dinner 11/15/22 2/14/23  Glen Mcdonald MD   BD PEN NEEDLE DEV U/F 32G X 4 MM MISC Inject under the skin 4 (four) times a day 1/24/19   Cricket Askew PA-C   Blood Glucose Monitoring Suppl (ONE TOUCH ULTRA 2) w/Device KIT by Does not apply route 2 (two) times a day 11/2/18   Cricket Askew PA-C   Blood Glucose Monitoring Suppl (OneTouch Verio Reflect) w/Device KIT Check blood sugars twice daily  Please substitute with appropriate alternative as covered by patient's insurance  Dx: E11 65 2/26/23   Glen Mcdonald MD   clopidogrel (PLAVIX) 75 mg tablet Take 1 tablet (75 mg total) by mouth daily 11/15/22 2/14/23  Glen Mcdonald MD   docusate sodium (COLACE) 100 mg capsule Take 1 capsule (100 mg total) by mouth 2 (two) times a day 2/7/23   Liliam Lima PA-C   glucose blood (OneTouch Verio) test strip Check blood sugars twice daily  Please substitute with appropriate alternative as covered by patient's insurance   Dx: E11 65 2/26/23   Glen Mcdonald MD   Insulin Glargine Solostar (Lantus SoloStar) 100 UNIT/ML SOPN Inject 45 units bid 11/15/22   Glen Mcdonald MD   insulin glulisine (Apidra) 100 units/mL injection Inject 30 units tid with meals 10/18/21   Glen Mcdonald MD   irbesartan-hydrochlorothiazide (AVALIDE) 150-12 5 MG per tablet TAKE 1 TABLET DAILY 2/6/23   Glen Mcdonald MD levothyroxine 150 mcg tablet Take 1 tablet (150 mcg total) by mouth daily 11/15/22   Rajan Osorio MD   naltrexone (REVIA) 50 mg tablet Take 1 tablet (50 mg total) by mouth daily 6/7/22   Rajan Osorio MD   omeprazole (PriLOSEC) 40 MG capsule TAKE 1 CAPSULE DAILY 1/4/23   Rajan Osorio MD   OneTouch Delica Lancets 00P MISC Check blood sugars twice daily  Please substitute with appropriate alternative as covered by patient's insurance  Dx: E11 65 2/26/23   Rajan Osorio MD   polyethylene glycol (MIRALAX) 17 g packet Take 17 g by mouth daily 2/7/23   Aura Marino PA-C   senna (SENOKOT) 8 6 MG tablet Take 1 tablet (8 6 mg total) by mouth daily 2/7/23   Aura Marino PA-C   traMADol (Ultram) 50 mg tablet Take 1 tablet (50 mg total) by mouth every 6 (six) hours as needed for moderate pain 2/21/23   Krys Fall MD   verapamil (VERELAN PM) 360 MG 24 hr capsule TAKE 1 CAPSULE DAILY AT BEDTIME 10/13/22   Rajan Osorio MD   zolpidem (AMBIEN) 10 mg tablet Take 1 tablet (10 mg total) by mouth daily at bedtime as needed for sleep 2/13/23   Rajan Osorio MD     I have reviewed home medications with patient personally  Allergies:    Allergies   Allergen Reactions   • Pollen Extract Eye Swelling     Eyes get watery        Social History:  Marital Status: Single   Occupation: NA  Patient Pre-hospital Living Situation: Home  Patient Pre-hospital Level of Mobility: walks  Patient Pre-hospital Diet Restrictions: diabetic  Substance Use History:   Social History     Substance and Sexual Activity   Alcohol Use Not Currently   • Alcohol/week: 8 0 standard drinks   • Types: 8 Cans of beer per week    Comment: last drink was 10 days ago     Social History     Tobacco Use   Smoking Status Never   Smokeless Tobacco Never     Social History     Substance and Sexual Activity   Drug Use Yes   • Types: Marijuana    Comment: occassionally       Family History:  Family History   Problem Relation Age of Onset   • Cancer Mother    • Hypertension Father        Physical Exam:     Vitals:   Blood Pressure: 100/67 (03/03/23 2015)  Pulse: 70 (03/03/23 2015)  Temperature: 97 7 °F (36 5 °C) (03/03/23 1144)  Temp Source: Oral (03/03/23 2015)  Respirations: 18 (03/03/23 2015)  Height: 6' 3" (190 5 cm) (03/03/23 2015)  Weight - Scale: 122 kg (268 lb 15 4 oz) (03/03/23 2015)  SpO2: 96 % (03/03/23 1830)    Physical Exam  Vitals and nursing note reviewed  Constitutional:       Appearance: He is obese  He is not toxic-appearing  Comments: Chronically ill-appearing, disheveled   HENT:      Head: Normocephalic and atraumatic  Nose: Nose normal       Mouth/Throat:      Mouth: Mucous membranes are moist       Pharynx: Oropharynx is clear  Eyes:      Conjunctiva/sclera: Conjunctivae normal       Pupils: Pupils are equal, round, and reactive to light  Cardiovascular:      Rate and Rhythm: Normal rate  Pulses: Normal pulses  Pulmonary:      Effort: Pulmonary effort is normal  No respiratory distress  Breath sounds: Normal breath sounds  Abdominal:      Palpations: Abdomen is soft  Tenderness: There is no abdominal tenderness  There is no guarding  Musculoskeletal:         General: No swelling or tenderness  Cervical back: Normal range of motion and neck supple  Comments: Limited ROM R LE   Skin:     General: Skin is warm and dry  Neurological:      General: No focal deficit present  Mental Status: He is alert and oriented to person, place, and time  Mental status is at baseline     Psychiatric:      Comments: Tearful, anxious         Additional Data:     Lab Results:  Results from last 7 days   Lab Units 03/03/23  1243   WBC Thousand/uL 7 75   HEMOGLOBIN g/dL 11 4*   HEMATOCRIT % 32 7*   PLATELETS Thousands/uL 199   NEUTROS PCT % 68   LYMPHS PCT % 17   MONOS PCT % 13*   EOS PCT % 1     Results from last 7 days   Lab Units 03/03/23  1524 03/03/23  1243   SODIUM mmol/L 130* 129*   POTASSIUM mmol/L 3 4* 3 9 CHLORIDE mmol/L 92* 90*   CO2 mmol/L 22 22   BUN mg/dL 55* 55*   CREATININE mg/dL 2 15* 2 11*   ANION GAP mmol/L 16* 17*   CALCIUM mg/dL 9 2 9 1   ALBUMIN g/dL  --  3 7   TOTAL BILIRUBIN mg/dL  --  0 85   ALK PHOS U/L  --  119*   ALT U/L  --  33   AST U/L  --  36   GLUCOSE RANDOM mg/dL 290* 300*         Results from last 7 days   Lab Units 03/03/23  1819 03/03/23  1148   POC GLUCOSE mg/dl 308* 288*               Lines/Drains:  Invasive Devices     Peripheral Intravenous Line  Duration           Peripheral IV 03/03/23 Distal;Right;Ventral (anterior) Forearm <1 day    Peripheral IV 03/03/23 Dorsal (posterior); Left Wrist <1 day                    Imaging: Reviewed radiology reports from this admission including: chest xray and Personally reviewed the following imaging: chest xray  XR chest 2 views   ED Interpretation by Robin Lantigua DO (03/03 1311)   NAD      Final Result by Ibeth Trivedi MD (03/03 1346)      No acute cardiopulmonary disease  Workstation performed: [id number]             EKG and Other Studies Reviewed on Admission:   · EKG: NSR  HR 70s  ** Please Note: This note has been constructed using a voice recognition system   **

## 2023-03-04 NOTE — ASSESSMENT & PLAN NOTE
· Mentioned SI  · Evaluated by Psychiatry in the ED, recommending inpatient psych admission   Patient agreeable  · Started on lexapro 10mg, remeron 7 5mg, atarax 25mg q6h PRN  · Continue 1:1 observation

## 2023-03-04 NOTE — ASSESSMENT & PLAN NOTE
· Suspect in the setting of dehydration, poor oral intake, GI losses also possible obstruction  · S/p IV hydration  · Monitor BMP, creatinine improving  · Noted urinary retention on bladder scan requiring cath  Check ultrasound kidney/bladder to evaluate for obstruction    Started Flomax  · Holding home irbesartan/hctz

## 2023-03-04 NOTE — ASSESSMENT & PLAN NOTE
· Hx of fall and knee injury last month, unable to take care of self at home  · PT/OT evluation  · CM for placement

## 2023-03-04 NOTE — QUICK NOTE
Notified earlier in the evening patient with difficulty urinating, bladder scan over 700 mL  Urinary retention protocol placed  Now RN reporting patient again on bladder scan around 03 100 had 435 mL and refused interventions and now bladder scan again revealing 777 mL  Due to feeling urinary retention protocol will add order to place urinary Mae catheter  Continue to monitor intake and output

## 2023-03-04 NOTE — PROGRESS NOTES
[address] [facility]  Progress Note - Sofia Mccoy 1965, 59 y o  male MRN: [id number]  Unit/Bed#: MS Jerry Encounter: [id number]  Primary Care Provider: Aroldo Matos MD   Date and time admitted to hospital: 3/3/2023 11:45 AM    * Failure to thrive in adult  Assessment & Plan  · Reports inability to care for self at home, wife also unable to take care of patient  · Reports history of fall with knee injury about a month ago, multiple history of falls, also complaining of weakness, poor oral intake, difficulty controlling erratic sugar levels  · Also mentioned SI  · For placement, seen by PT/OT and recommending rehab, evaluated by Psych while patient is in the ED and recommending inpatient psychiatry admission which the patient is agreeable to    Depression and anxiety  Assessment & Plan  · Mentioned SI  · Evaluated by Psychiatry in the ED, recommending inpatient psych admission  Patient agreeable  · Started on lexapro 10mg, remeron 7 5mg, atarax 25mg q6h PRN  · Continue 1:1 observation    MARKELL (acute kidney injury) ([facility] [address] )  Assessment & Plan  · Suspect in the setting of dehydration, poor oral intake, GI losses also possible obstruction  · S/p IV hydration  · Monitor BMP, creatinine improving  · Noted urinary retention on bladder scan requiring cath  Check ultrasound kidney/bladder to evaluate for obstruction  Started Flomax  · Holding home irbesartan/hctz    Physical deconditioning  Assessment & Plan  · Hx of fall and knee injury last month, unable to take care of self at home  · PT/OT evluation  · CM for placement    HHS vs DKA  Assessment & Plan  Lab Results   Component Value Date    HGBA1C 11 1 (H) 11/14/2022       Recent Labs     03/04/23  0557 03/04/23  0802 03/04/23  1003 03/04/23  1200   POCGLU 171* 156* 190* 169*       Blood Sugar Average: Last 72 hrs:  (P) 198     · Gap closed    · Transition to subcutaneous basal/bolus insulin, stop insulin infusion 2 hours after initiation of Lantus  · Insulin sliding scale with Accu-Cheks  · Diabetic diet    Hypertension, essential  Assessment & Plan  · Holding home irberstan/hctz in the setting of MARKELL  · Continue home verapamil        VTE Pharmacologic Prophylaxis:   Pharmacologic: Low risk  Mechanical VTE Prophylaxis in Place: Yes    Review of Systems:    Review of Systems   Constitutional: Negative for chills and fever  HENT: Negative for ear pain and sore throat  Eyes: Negative for pain and visual disturbance  Respiratory: Negative for cough and shortness of breath  Cardiovascular: Negative for chest pain and palpitations  Gastrointestinal: Negative for abdominal pain and vomiting  Genitourinary: Negative for dysuria and hematuria  Musculoskeletal: Negative for arthralgias and back pain  Skin: Negative for color change and rash  Neurological: Negative for seizures and syncope  All other systems reviewed and are negative        Past Medical and Surgical History:     Past Medical History:   Diagnosis Date   • Broken internal right knee prosthesis ([facility] [address] ) 01/2023   • Chronic kidney disease    • Colon polyp    • Diabetes mellitus ([facility] [address] )    • Hyperlipidemia    • Hypertension    • Thyroid cancer [facility])        Past Surgical History:   Procedure Laterality Date   • COLONOSCOPY     • FOOT AMPUTATION Right    • FOOT SURGERY Right 2014   • IR AORTAGRAM WITH RUN-OFF  08/06/2020   • IR TUNNELED CENTRAL LINE PLACEMENT  09/08/2020   • IR TUNNELED CENTRAL LINE REMOVAL  09/28/2020   • WI AMPUTATION FOOT TRANSMETARSAL Right 09/03/2020    Procedure: AMPUTATION TRANSMETATARSAL (TMA);  Surgeon: Rolanda Duncan DPM;  Location: [facility];  Service: Podiatry   • WI THYROIDECTOMY TOTAL/COMPLETE N/A 02/03/2021    Procedure: TOTAL THYROIDECTOMY WITH NIMS MONITORING;  Surgeon: Og Gardiner MD;  Location: [facility];  Service: ENT   • TOE AMPUTATION Right 08/11/2020    Procedure: AMPUTATION TOE;  Surgeon: Rolanda Duncan DPM;  Location: [facility];  Service: Podiatry   • Atul 634 THYROID BIOPSY  2020       Patient Centered Rounds: I have performed bedside rounds with nursing staff today  Discussions with Specialists or Other Care Team Provider: Nursing    Education and Discussions with Family / Patient: Patient    Time Spent for Care: 45 minutes  More than 50% of total time spent on counseling and coordination of care as described above  Current Length of Stay: 1 day(s)    Current Patient Status: Inpatient   Certification Statement: The patient will continue to require additional inpatient hospital stay due to Renal function monitoring    Discharge Plan: Likely the next 24 to 48 hours    Code Status: Level 1 - Full Code      Subjective:   No acute events    Objective:     Vitals:   Temp (24hrs), Av 5 °F (36 9 °C), Min:98 2 °F (36 8 °C), Max:99 1 °F (37 3 °C)    Temp:  [98 2 °F (36 8 °C)-99 1 °F (37 3 °C)] 98 2 °F (36 8 °C)  HR:  [69-76] 70  Resp:  [17-36] 17  BP: (100-131)/(65-93) 118/76  SpO2:  [96 %-99 %] 97 %  Body mass index is 33 62 kg/m²  Input and Output Summary (last 24 hours): Intake/Output Summary (Last 24 hours) at 3/4/2023 1206  Last data filed at 3/4/2023 1009  Gross per 24 hour   Intake 3040 ml   Output 2250 ml   Net 790 ml       Physical Exam:     Physical Exam  Vitals and nursing note reviewed  Constitutional:       General: He is not in acute distress  Appearance: He is well-developed  He is not ill-appearing or diaphoretic  HENT:      Head: Normocephalic and atraumatic  Mouth/Throat:      Mouth: Mucous membranes are moist       Pharynx: Oropharynx is clear  Eyes:      Conjunctiva/sclera: Conjunctivae normal    Cardiovascular:      Rate and Rhythm: Normal rate and regular rhythm  Heart sounds: No murmur heard  Pulmonary:      Effort: Pulmonary effort is normal  No respiratory distress  Breath sounds: Normal breath sounds  No wheezing or rales     Abdominal:      General: Bowel sounds are normal  Palpations: Abdomen is soft  Tenderness: There is no abdominal tenderness  Musculoskeletal:         General: No swelling  Cervical back: Normal range of motion and neck supple  Right lower leg: No edema  Left lower leg: No edema  Skin:     General: Skin is warm and dry  Capillary Refill: Capillary refill takes less than 2 seconds  Neurological:      General: No focal deficit present  Mental Status: He is alert and oriented to person, place, and time  Psychiatric:         Mood and Affect: Mood normal          Behavior: Behavior normal          Additional Data:     Labs:    Results from last 7 days   Lab Units 03/04/23  0555   WBC Thousand/uL 7 24   HEMOGLOBIN g/dL 10 7*   HEMATOCRIT % 30 6*   PLATELETS Thousands/uL 188   NEUTROS PCT % 57   LYMPHS PCT % 26   MONOS PCT % 13*   EOS PCT % 2     Results from last 7 days   Lab Units 03/04/23  1021 03/03/23  1524 03/03/23  1243   SODIUM mmol/L 130*   < > 129*   POTASSIUM mmol/L 3 6   < > 3 9   CHLORIDE mmol/L 98   < > 90*   CO2 mmol/L 22   < > 22   BUN mg/dL 39*   < > 55*   CREATININE mg/dL 1 42*   < > 2 11*   ANION GAP mmol/L 10   < > 17*   CALCIUM mg/dL 8 6   < > 9 1   ALBUMIN g/dL  --   --  3 7   TOTAL BILIRUBIN mg/dL  --   --  0 85   ALK PHOS U/L  --   --  119*   ALT U/L  --   --  33   AST U/L  --   --  36   GLUCOSE RANDOM mg/dL 175*   < > 300*    < > = values in this interval not displayed  Results from last 7 days   Lab Units 03/04/23  1200 03/04/23  1003 03/04/23  0802 03/04/23  0557 03/04/23  0333 03/04/23  0131 03/03/23  2323 03/03/23  2103 03/03/23  1819 03/03/23  1148   POC GLUCOSE mg/dl 169* 190* 156* 171* 153* 188* 178* 179* 308* 288*                   * I Have Reviewed All Lab Data Listed Above  * Additional Pertinent Lab Tests Reviewed:  All Labs For Current Hospital Admission Reviewed    Imaging:    Imaging Reports Reviewed Today Include: Chest x-ray  Imaging Personally Reviewed by Myself Includes: none    Recent Cultures (last 7 days):           Last 24 Hours Medication List:   Current Facility-Administered Medications   Medication Dose Route Frequency Provider Last Rate   • acetaminophen  975 mg Oral Q8H PRN Rabia Dhillon MD     • aspirin  81 mg Oral Daily Bridget Santacruz MD     • atorvastatin  40 mg Oral Daily With Mary Cramer MD     • clopidogrel  75 mg Oral Daily Bridget Santacruz MD     • escitalopram  10 mg Oral Daily Bridget Santacruz MD     • hydrOXYzine HCL  25 mg Oral Q6H PRN Bridget Santacruz MD     • insulin glargine  25 Units Subcutaneous Q12H [facility] Rabia Dhillon MD     • insulin lispro  1-6 Units Subcutaneous 4x Daily (AC & HS) Rabia Dhillon MD     • insulin lispro  10 Units Subcutaneous TID With Meals Rabia Dhillon MD     • insulin regular (HumuLIN R,NovoLIN R) infusion  0 3-21 Units/hr Intravenous Titrated Rabia Dhillon MD Stopped (03/04/23 1203)   • levothyroxine  150 mcg Oral Daily Bridget Santacruz MD     • mirtazapine  7 5 mg Oral HS Bridget Santacruz MD     • ondansetron  4 mg Intravenous Q6H PRN Bridget Santacruz MD     • pantoprazole  40 mg Oral Early Morning Bridget Santacruz MD     • tamsulosin  0 4 mg Oral Daily With Norina Nissen, MD     • traMADol  50 mg Oral Q6H PRN Rabia Dhillon MD     • verapamil  360 mg Oral Daily Bridget Santacruz MD          Today, Patient Was Seen By: Mayur Pendleton MD    ** Please Note: Dictation voice to text software may have been used in the creation of this document   **

## 2023-03-04 NOTE — UTILIZATION REVIEW
Initial Clinical Review    Admission: Date/Time/Statement:   Admission Orders (From admission, onward)     Ordered        03/03/23 1818  INPATIENT ADMISSION  Once                      Orders Placed This Encounter   Procedures   • INPATIENT ADMISSION     Standing Status:   Standing     Number of Occurrences:   1     Order Specific Question:   Level of Care     Answer:   Med Surg [16]     Order Specific Question:   Estimated length of stay     Answer:   More than 2 Midnights     Order Specific Question:   Certification     Answer:   I certify that inpatient services are medically necessary for this patient for a duration of greater than two midnights  See H&P and MD Progress Notes for additional information about the patient's course of treatment  ED Arrival Information     Expected   -    Arrival   3/3/2023 11:37    Acuity   Emergent            Means of arrival   Ambulance    Escorted by   Maria Ines Richards [facility])    Service   Hospitalist    Admission type   Emergency            Arrival complaint   DIABETIC COMPLICATIONS           Chief Complaint   Patient presents with   • Failure To Thrive     Fall w/ knee injury about a month ago, since has been unable to ambulate or care for self  Poor PO intake, multiple falls, unable to regulate BG  Lives w/ wife who is also unable to care for pt  • Psychiatric Evaluation     Pt states suicidal ideations for weeks w/ plan to shoot self w/ a gun, pt w/o access to firearms as wife has locked them up  Pt states "I just don't want to put the burden on my wife " Denies any intent to carry out plan, denies preparatory acts  Initial Presentation: 59 y o  male   To eR From home via EMS:   PMH      hypertension, hyperlipidemia, diabetes, CKD stage III, GERD, thyroid cancer, peripheral artery disease, alcohol abuse presenting with suicidal ideation, failure to thrive  Patient states that roughly 1 month ago he fell down stairs and broke his right knee w/head strike    Reports multiple falls since - has difficulty getting himself up when he falls     - not able to get to the bathroom in time and has accidents  decreased p o  intake as well as intermittent vomiting and dry heaving in the morning, and diarrhea for the past several days  He notes suicidal ideation for the past several weeks with plan to shoot himself but states that his wife locked up his firearms -   never been hospitalized in a psychiatric institution   ADMIT  IP status,  MS Level of care  For management of  HHS vs DKA,  FTT in adult,  Depression/anxiety,  MARKELL;  Mildly elevated AG, hyperglycemia  (starvation ketosis vs DKA)     Continue IV fluids,  Initiate Insulin gtt, closely monitor BGL and electrolytes  Continue insulin drip, IVF  Trend  BMP closely, replace electrolytes  Consult Endocrinology  Holding home irberstan/hctz in the setting of MARKELL  Cont 1:1 monitoring for  SI  Check ultrasound kidney/bladder to evaluate for obstruction  Started Flomax  Order PT/OT evals  3/03  PSYCH CONSULT -  Upon medical clearance, inpatient psychiatric treatment is indicated - patient is in agreement with this plan and is appropriate to sign 201 voluntary paperwork  starting Lexapro 10 mg, Remeron 7 5 mg p o  nightly   hydroxyzine 25 mg p o  every 6 hours as needed anxiety  Date:   3/04      Day 2:    An gap closed - transition to sq basal/bolus insulin  -  Stop insulin gtt 2H after initiation of Lantus  Diabetic diet  difficulty urinating, bladder scan over 700 mL - place urinary Mae catheter  Continue to monitor intake and output  crt improving  PT/OT evals          ED Triage Vitals   Temperature Pulse Respirations Blood Pressure SpO2   03/03/23 1144 03/03/23 1144 03/03/23 1144 03/03/23 1144 03/03/23 1144   97 7 °F (36 5 °C) 71 20 111/60 99 %      Temp Source Heart Rate Source Patient Position - Orthostatic VS BP Location FiO2 (%)   03/03/23 1144 03/03/23 1400 03/03/23 1144 03/03/23 1144 --   Oral Monitor Lying Right arm       Pain Score       03/03/23 1144       6          Wt Readings from Last 1 Encounters:   03/03/23 122 kg (268 lb 15 4 oz)     Additional Vital Signs:   03/04/23 1414 98 8 °F (37 1 °C) -- 17 97/67 58  -- --   03/04/23 1208 98 7 °F (37 1 °C) -- 17 101/71 -- -- --   03/04/23 1010 98 2 °F (36 8 °C) -- 17 118/76 68 -- --   03/04/23 0824 -- -- -- -- -- -- None (Room air)   03/04/23 0812 98 2 °F (36 8 °C) -- 17 102/77 -- -- --   03/04/23 0700 -- 70 -- 125/70 91 -- --   03/04/23 0002 99 1 °F (37 3 °C) 72 18 119/74 -- 97 % None (Room air)   03/03/23 2015 -- 70 18 100/67 -- -- --   03/03/23 1830 -- 75 19 131/80 98 96 % None (Room air)   03/03/23 1807 -- 76 17 127/93 106 99 % None (Room air)   03/03/23 1730 -- 72 18 112/73 87 97 % None (Room air)   03/03/23 1545 -- 70 17 108/65 82 97 % None (Room air)   03/03/23 1500 -- 71 36  128/75 91 97 % None (Room air)   03/03/23 1400 -- 69 19 101/67 78 99 % None (Room air)     Pertinent Labs/Diagnostic Test Results:   XR chest 2 views   ED Interpretation by Tomy Kline DO (03/03 1311)   No acute cardiopulmonary disease              US kidney and bladder    (Results Pending)     Results from last 7 days   Lab Units 03/03/23  1243   SARS-COV-2  Negative     Results from last 7 days   Lab Units 03/04/23  0555 03/03/23  1243   WBC Thousand/uL 7 24 7 75   HEMOGLOBIN g/dL 10 7* 11 4*   HEMATOCRIT % 30 6* 32 7*   PLATELETS Thousands/uL 188 199   NEUTROS ABS Thousands/µL 4 12 5 20         Results from last 7 days   Lab Units 03/04/23  1021 03/04/23  0555 03/04/23  0000 03/03/23 2050 03/03/23  1524   SODIUM mmol/L 130* 131* 131* 131* 130*   POTASSIUM mmol/L 3 6 3 6 3 6 3 3* 3 4*   CHLORIDE mmol/L 98 98 96 95* 92*   CO2 mmol/L 22 22 20* 22 22   ANION GAP mmol/L 10 11 15* 14* 16*   BUN mg/dL 39* 42* 50* 50* 55*   CREATININE mg/dL 1 42* 1 49* 1 86* 1 79* 2 15*   EGFR ml/min/1 73sq m 53 50 38 40 32   CALCIUM mg/dL 8 6 8 7 8 9 8 8 9 2     Results from last 7 days   Lab Units 03/03/23  1243   AST U/L 36   ALT U/L 33   ALK PHOS U/L 119*   TOTAL PROTEIN g/dL 7 1   ALBUMIN g/dL 3 7   TOTAL BILIRUBIN mg/dL 0 85     Results from last 7 days   Lab Units 03/04/23  1200 03/04/23  1003 03/04/23  0802 03/04/23  0557 03/04/23  0333 03/04/23  0131 03/03/23  2323 03/03/23  2103 03/03/23  1819 03/03/23  1148   POC GLUCOSE mg/dl 169* 190* 156* 171* 153* 188* 178* 179* 308* 288*     Results from last 7 days   Lab Units 03/04/23  1021 03/04/23  0555 03/04/23  0000 03/03/23  2050 03/03/23  1524 03/03/23  1243   GLUCOSE RANDOM mg/dL 175* 148* 186* 240* 290* 300*             BETA-HYDROXYBUTYRATE   Date Value Ref Range Status   03/03/2023 3 4 (H) <0 6 mmol/L Final          Results from last 7 days   Lab Units 03/04/23  0000 03/03/23  1717   PH AFLGUNI  7 456* 7 469*   PCO2 FALGUNI mm Hg 31 2* 33 0*   PO2 FALGUNI mm Hg 61 0* 47 4*   HCO3 FALGUNI mmol/L 21 5* 23 4*   BASE EXC FALGUNI mmol/L -1 6 0 3   O2 CONTENT FALGUNI ml/dL 15 1 14 4   O2 HGB, VENOUS % 88 9* 82 2*             Results from last 7 days   Lab Units 03/03/23  1717 03/03/23  1524 03/03/23  1243   HS TNI 0HR ng/L  --   --  9   HS TNI 2HR ng/L  --  10  --    HSTNI D2 ng/L  --  1  --    HS TNI 4HR ng/L 9  --   --    HSTNI D4 ng/L 0  --   --      Results from last 7 days   Lab Units 03/04/23  0600   CLARITY UA  Clear   COLOR UA  Light Yellow   SPEC GRAV UA  1 014   PH UA  5 0   GLUCOSE UA mg/dl 1000 (1%)*   KETONES UA mg/dl Trace*   BLOOD UA  Negative   PROTEIN UA mg/dl Negative   NITRITE UA  Negative   BILIRUBIN UA  Negative   UROBILINOGEN UA (BE) mg/dl <2 0   LEUKOCYTES UA  Negative   WBC UA /hpf 1-2   RBC UA /hpf 1-2   BACTERIA UA /hpf None Seen   EPITHELIAL CELLS WET PREP /hpf None Seen     Results from last 7 days   Lab Units 03/03/23  1243   INFLUENZA A PCR  Negative   INFLUENZA B PCR  Negative   RSV PCR  Negative     ED Treatment:   Medication Administration from 03/03/2023 1136 to 03/03/2023 1943    Date/Time Order Dose Route Action   03/03/2023 1246 EST sodium chloride 0 9 % bolus 1,000 mL 1,000 mL Intravenous New Bag   03/03/2023 1803 EST sodium chloride 0 9 % bolus 1,000 mL 1,000 mL Intravenous New Bag   03/03/2023 1804 EST potassium chloride 20 mEq IVPB (premix) 20 mEq Intravenous New Bag   03/03/2023 1839 EST insulin regular (HumuLIN R,NovoLIN R) 1 Units/mL in sodium chloride 0 9 % 100 mL infusion 6 Units/hr Intravenous New Bag        Past Medical History:   Diagnosis Date   • Broken internal right knee prosthesis ([facility] [address] ) 01/2023   • Chronic kidney disease    • Colon polyp    • Diabetes mellitus ([facility] [address] )    • Hyperlipidemia    • Hypertension    • [facility] )      Present on Admission:  • MARKELL (acute kidney injury) ([facility] )  • Hypertension, essential  • Physical deconditioning      Admitting Diagnosis: Suicidal ideation [R45 851]  Ketosis ([facility] ) [E88 89]  Hyperglycemia [R73 9]  Failure to thrive in adult [R62 7]  Encounter for psychological evaluation [Z00 8]  Type 2 diabetes mellitus with diabetic peripheral angiopathy without gangrene, with long-term current use of insulin ([facility] ) [E11 51, Z79 4]  Age/Sex: 59 y o  male     Admission Orders:     See above note  US Kidney/bladder  Routine VS   Insulin gtt - accucks q2H  Lo carb diet  BMP q4H        Scheduled Medications:  aspirin, 81 mg, Oral, Daily  atorvastatin, 40 mg, Oral, Daily With Dinner  clopidogrel, 75 mg, Oral, Daily  escitalopram, 10 mg, Oral, Daily  insulin glargine, 25 Units, Subcutaneous, Q12H TESS  insulin lispro, 1-6 Units, Subcutaneous, 4x Daily (AC & HS)  insulin lispro, 10 Units, Subcutaneous, TID With Meals  levothyroxine, 150 mcg, Oral, Daily  mirtazapine, 7 5 mg, Oral, HS  pantoprazole, 40 mg, Oral, Early Morning  tamsulosin, 0 4 mg, Oral, Daily With Dinner  verapamil, 360 mg, Oral, Daily    3/03   One time meds   2130   IV 20 meq KCl    2340   IV 20 meq KCl     Continuous IV Infusions:      IVF NS @  150 cc/hr   (initiated 3/3 @ 2020 -  dc'ed  3/04 @ 1330)      PRN Meds:  acetaminophen, 975 mg, Oral, Q8H PRN  hydrOXYzine HCL, 25 mg, Oral, Q6H PRN  ondansetron, 4 mg, Intravenous, Q6H PRN  traMADol, 50 mg, Oral, Q6H PRN        IP CONSULT TO PSYCHIATRY  IP CONSULT TO CASE MANAGEMENT    Network Utilization Review Department  ATTENTION: Please call with any questions or concerns to [phone] and carefully listen to the prompts so that you are directed to the right person  All voicemails are confidential   Yoel Tarango all requests for admission clinical reviews, approved or denied determinations and any other requests to dedicated fax number below belonging to the campus where the patient is receiving treatment   List of dedicated fax numbers for the Facilities:  [address] DENIALS (Administrative/Medical Necessity) [phone]   [address] (Maternity/NICU/Pediatrics) [phone]   911 Cassie Rascon [phone]   [facility] [phone]   [address] [facility] [place] [address] [address] Arron Escalona 28 [phone]   [address] Sandra Guadarrama McDaniels 134 [address] [facility] [phone]

## 2023-03-05 LAB
ANION GAP SERPL CALCULATED.3IONS-SCNC: 9 MMOL/L (ref 4–13)
BUN SERPL-MCNC: 28 MG/DL (ref 5–25)
C DIFF TOX GENS STL QL NAA+PROBE: NEGATIVE
CALCIUM SERPL-MCNC: 8.5 MG/DL (ref 8.4–10.2)
CHLORIDE SERPL-SCNC: 99 MMOL/L (ref 96–108)
CO2 SERPL-SCNC: 25 MMOL/L (ref 21–32)
CREAT SERPL-MCNC: 1.15 MG/DL (ref 0.6–1.3)
ERYTHROCYTE [DISTWIDTH] IN BLOOD BY AUTOMATED COUNT: 12.5 % (ref 11.6–15.1)
GFR SERPL CREATININE-BSD FRML MDRD: 69 ML/MIN/1.73SQ M
GLUCOSE SERPL-MCNC: 105 MG/DL (ref 65–140)
GLUCOSE SERPL-MCNC: 132 MG/DL (ref 65–140)
GLUCOSE SERPL-MCNC: 142 MG/DL (ref 65–140)
GLUCOSE SERPL-MCNC: 151 MG/DL (ref 65–140)
GLUCOSE SERPL-MCNC: 155 MG/DL (ref 65–140)
GLUCOSE SERPL-MCNC: 180 MG/DL (ref 65–140)
HCT VFR BLD AUTO: 29.8 % (ref 36.5–49.3)
HGB BLD-MCNC: 10.2 G/DL (ref 12–17)
MCH RBC QN AUTO: 31.5 PG (ref 26.8–34.3)
MCHC RBC AUTO-ENTMCNC: 34.2 G/DL (ref 31.4–37.4)
MCV RBC AUTO: 92 FL (ref 82–98)
PLATELET # BLD AUTO: 183 THOUSANDS/UL (ref 149–390)
PMV BLD AUTO: 10.7 FL (ref 8.9–12.7)
POTASSIUM SERPL-SCNC: 3.4 MMOL/L (ref 3.5–5.3)
RBC # BLD AUTO: 3.24 MILLION/UL (ref 3.88–5.62)
SODIUM SERPL-SCNC: 133 MMOL/L (ref 135–147)
WBC # BLD AUTO: 6.54 THOUSAND/UL (ref 4.31–10.16)

## 2023-03-05 RX ORDER — POTASSIUM CHLORIDE 20 MEQ/1
40 TABLET, EXTENDED RELEASE ORAL ONCE
Status: COMPLETED | OUTPATIENT
Start: 2023-03-05 | End: 2023-03-05

## 2023-03-05 RX ADMIN — INSULIN GLARGINE 25 UNITS: 100 INJECTION, SOLUTION SUBCUTANEOUS at 22:13

## 2023-03-05 RX ADMIN — CLOPIDOGREL BISULFATE 75 MG: 75 TABLET ORAL at 09:42

## 2023-03-05 RX ADMIN — PANTOPRAZOLE SODIUM 40 MG: 40 TABLET, DELAYED RELEASE ORAL at 06:36

## 2023-03-05 RX ADMIN — TAMSULOSIN HYDROCHLORIDE 0.4 MG: 0.4 CAPSULE ORAL at 17:14

## 2023-03-05 RX ADMIN — ASPIRIN 81 MG 81 MG: 81 TABLET ORAL at 09:42

## 2023-03-05 RX ADMIN — POTASSIUM CHLORIDE 40 MEQ: 1500 TABLET, EXTENDED RELEASE ORAL at 09:42

## 2023-03-05 RX ADMIN — INSULIN LISPRO 10 UNITS: 100 INJECTION, SOLUTION INTRAVENOUS; SUBCUTANEOUS at 13:28

## 2023-03-05 RX ADMIN — LEVOTHYROXINE SODIUM 150 MCG: 150 TABLET ORAL at 09:42

## 2023-03-05 RX ADMIN — ESCITALOPRAM OXALATE 10 MG: 10 TABLET ORAL at 09:42

## 2023-03-05 RX ADMIN — ATORVASTATIN CALCIUM 40 MG: 40 TABLET, FILM COATED ORAL at 17:14

## 2023-03-05 RX ADMIN — INSULIN LISPRO 1 UNITS: 100 INJECTION, SOLUTION INTRAVENOUS; SUBCUTANEOUS at 12:05

## 2023-03-05 RX ADMIN — ONDANSETRON 4 MG: 2 INJECTION INTRAMUSCULAR; INTRAVENOUS at 09:55

## 2023-03-05 RX ADMIN — ACETAMINOPHEN 975 MG: 325 TABLET, FILM COATED ORAL at 15:55

## 2023-03-05 RX ADMIN — INSULIN GLARGINE 25 UNITS: 100 INJECTION, SOLUTION SUBCUTANEOUS at 09:42

## 2023-03-05 RX ADMIN — INSULIN LISPRO 10 UNITS: 100 INJECTION, SOLUTION INTRAVENOUS; SUBCUTANEOUS at 09:42

## 2023-03-05 NOTE — PROGRESS NOTES
[address] [facility]  Progress Note - Cristina Her 1965, 59 y o  male MRN: [id number]  Unit/Bed#: [facility] Encounter: [id number]  Primary Care Provider: Elif Villegas MD   Date and time admitted to hospital: 3/3/2023 11:45 AM    * Failure to thrive in adult  Assessment & Plan  · Reports inability to care for self at home, wife also unable to take care of patient  · Reports history of fall with knee injury about a month ago, multiple history of falls, also complaining of weakness, poor oral intake, difficulty controlling erratic sugar levels  · For placement, seen by PT/OT and recommending rehab, evaluated by Psych while patient is in the ED and recommending inpatient psychiatry admission which the patient is agreeable to    Depression and anxiety  Assessment & Plan  · Mentioned SI however now denying any more SI   · Evaluated by Psychiatry in the ED, recommending inpatient psych admission  Patient agreeable  · Started on lexapro 10mg, remeron 7 5mg, atarax 25mg q6h PRN  · Continue 1:1 observation  · Will ask psych to re evaluate patient for disposition     MARKELL (acute kidney injury) ([facility] )  Assessment & Plan  · Suspect in the setting of dehydration, poor oral intake, GI losses also possible obstruction  · S/p IV hydration  · Monitor BMP, creatinine improving  · Noted urinary retention on bladder scan requiring cath  ultrasound kidney/bladder negative for obstruction  Started Flomax  · Holding home irbesartan/hctz  · Mae catheter inserted 3/4   Will try voiding trial prior to discharge     Physical deconditioning  Assessment & Plan  · Hx of fall and knee injury last month, unable to take care of self at home  · PT/OT evluation  · CM for placement    HHS vs DKA  Assessment & Plan  Lab Results   Component Value Date    HGBA1C 11 1 (H) 11/14/2022       Recent Labs     03/04/23  0557 03/04/23  0802 03/04/23  1003 03/04/23  1200   POCGLU 171* 156* 190* 169*       Blood Sugar Average: Last 72 hrs:  (P) 198     · Gap closed  · Transitioned to subcutaneous basal/bolus insulin  · Insulin sliding scale with Accu-Cheks  · Diabetic diet    Hypertension, essential  Assessment & Plan  · Holding home irberstan/hctz in the setting of MARKELL  · Continue home verapamil    VTE Pharmacologic Prophylaxis:   Pharmacologic: low risk  Mechanical VTE Prophylaxis in Place: Yes    Review of Systems:    Review of Systems   Constitutional: Negative for chills and fever  HENT: Negative for ear pain and sore throat  Eyes: Negative for pain and visual disturbance  Respiratory: Negative for cough and shortness of breath  Cardiovascular: Negative for chest pain and palpitations  Gastrointestinal: Negative for abdominal pain and vomiting  Genitourinary: Negative for dysuria and hematuria  Musculoskeletal: Negative for arthralgias and back pain  Skin: Negative for color change and rash  Neurological: Negative for seizures and syncope  All other systems reviewed and are negative        Past Medical and Surgical History:     Past Medical History:   Diagnosis Date   • Broken internal right knee prosthesis ([facility] [address] ) 01/2023   • Chronic kidney disease    • Colon polyp    • Diabetes mellitus ([facility] [address] )    • Hyperlipidemia    • Hypertension    • Thyroid cancer [facility])        Past Surgical History:   Procedure Laterality Date   • COLONOSCOPY     • FOOT AMPUTATION Right    • FOOT SURGERY Right 2014   • IR AORTAGRAM WITH RUN-OFF  08/06/2020   • IR TUNNELED CENTRAL LINE PLACEMENT  09/08/2020   • IR TUNNELED CENTRAL LINE REMOVAL  09/28/2020   • MA AMPUTATION FOOT TRANSMETARSAL Right 09/03/2020    Procedure: AMPUTATION TRANSMETATARSAL (TMA);  Surgeon: Latesha Sanabria DPM;  Location: [facility] MA[facility];  Service: Podiatry   • MA THYROIDECTOMY TOTAL/COMPLETE N/A 02/03/2021    Procedure: TOTAL THYROIDECTOMY WITH NIMS MONITORING;  Surgeon: Jono Reyes MD;  Location:  MAIN OR;  Service: ENT   • TOE AMPUTATION Right 08/11/2020    Procedure: AMPUTATION TOE;  Surgeon: Latesha Sanabria DPM;  Location: [facility];  Service: Podiatry   • [address] THYROID BIOPSY  2020       Patient Centered Rounds: I have performed bedside rounds with nursing staff today  Discussions with Specialists or Other Care Team Provider: nursing    Education and Discussions with Family / Patient: patient     Time Spent for Care: 45 minutes  More than 50% of total time spent on counseling and coordination of care as described above  Current Length of Stay: 2 day(s)    Current Patient Status: Inpatient   Certification Statement: The patient will continue to require additional inpatient hospital stay due to pending clinical improvement     Discharge Plan: likely tomorrow to rehab    Code Status: Level 1 - Full Code      Subjective:   No complaints     Objective:     Vitals:   Temp (24hrs), Av 4 °F (36 9 °C), Min:97 7 °F (36 5 °C), Max:98 8 °F (37 1 °C)    Temp:  [97 7 °F (36 5 °C)-98 8 °F (37 1 °C)] 97 7 °F (36 5 °C)  HR:  [68-75] 75  Resp:  [17-19] 19  BP: ()/(67-68) 102/68  SpO2:  [95 %-98 %] 97 %  Body mass index is 33 62 kg/m²  Input and Output Summary (last 24 hours): Intake/Output Summary (Last 24 hours) at 3/5/2023 1316  Last data filed at 3/5/2023 1228  Gross per 24 hour   Intake 965 ml   Output 1550 ml   Net -585 ml       Physical Exam:     Physical Exam  Vitals and nursing note reviewed  Constitutional:       General: He is not in acute distress  Appearance: He is well-developed  HENT:      Head: Normocephalic and atraumatic  Eyes:      Conjunctiva/sclera: Conjunctivae normal    Cardiovascular:      Rate and Rhythm: Normal rate and regular rhythm  Heart sounds: No murmur heard  Pulmonary:      Effort: Pulmonary effort is normal  No respiratory distress  Breath sounds: Normal breath sounds  Abdominal:      Palpations: Abdomen is soft  Tenderness: There is no abdominal tenderness     Musculoskeletal:         General: No swelling  Cervical back: Neck supple  Skin:     General: Skin is warm and dry  Capillary Refill: Capillary refill takes less than 2 seconds  Neurological:      Mental Status: He is alert  Psychiatric:         Mood and Affect: Mood normal            Additional Data:     Labs:    Results from last 7 days   Lab Units 03/05/23  0442 03/04/23  0555   WBC Thousand/uL 6 54 7 24   HEMOGLOBIN g/dL 10 2* 10 7*   HEMATOCRIT % 29 8* 30 6*   PLATELETS Thousands/uL 183 188   NEUTROS PCT %  --  57   LYMPHS PCT %  --  26   MONOS PCT %  --  13*   EOS PCT %  --  2     Results from last 7 days   Lab Units 03/05/23  0442 03/03/23  1524 03/03/23  1243   SODIUM mmol/L 133*   < > 129*   POTASSIUM mmol/L 3 4*   < > 3 9   CHLORIDE mmol/L 99   < > 90*   CO2 mmol/L 25   < > 22   BUN mg/dL 28*   < > 55*   CREATININE mg/dL 1 15   < > 2 11*   ANION GAP mmol/L 9   < > 17*   CALCIUM mg/dL 8 5   < > 9 1   ALBUMIN g/dL  --   --  3 7   TOTAL BILIRUBIN mg/dL  --   --  0 85   ALK PHOS U/L  --   --  119*   ALT U/L  --   --  33   AST U/L  --   --  36   GLUCOSE RANDOM mg/dL 155*   < > 300*    < > = values in this interval not displayed  Results from last 7 days   Lab Units 03/05/23  1102 03/05/23  0712 03/04/23  2050 03/04/23  1540 03/04/23  1410 03/04/23  1200 03/04/23  1003 03/04/23  0802 03/04/23  0557 03/04/23  0333 03/04/23  0131 03/03/23  2323   POC GLUCOSE mg/dl 151* 142* 143* 158* 179* 169* 190* 156* 171* 153* 188* 178*                   * I Have Reviewed All Lab Data Listed Above  * Additional Pertinent Lab Tests Reviewed:  Prade[address] Admission Reviewed    Imaging:    Imaging Reports Reviewed Today Include: none  Imaging Personally Reviewed by Myself Includes:  none    Recent Cultures (last 7 days):     Results from last 7 days   Lab Units 03/04/23  1320   C DIFF TOXIN B BY PCR  Negative       Last 24 Hours Medication List:   Current Facility-Administered Medications   Medication Dose Route Frequency Provider Last Rate   • acetaminophen  975 mg Oral Q8H PRN Shayan Roy MD     • aspirin  81 mg Oral Daily Renato Brannon MD     • atorvastatin  40 mg Oral Daily With Demetri Araujo MD     • clopidogrel  75 mg Oral Daily Renato Brannon MD     • escitalopram  10 mg Oral Daily Renato Brannon MD     • hydrOXYzine HCL  25 mg Oral Q6H PRN Renato Brannon MD     • insulin glargine  25 Units Subcutaneous Q12H [address] Shayan Roy MD     • insulin lispro  1-6 Units Subcutaneous 4x Daily (AC & HS) Shayan Roy MD     • insulin lispro  10 Units Subcutaneous TID With Meals Shayan Roy MD     • levothyroxine  150 mcg Oral Daily Renato Brannon MD     • mirtazapine  7 5 mg Oral HS Renato Brannon MD     • ondansetron  4 mg Intravenous Q6H PRN Renato Brannon MD     • pantoprazole  40 mg Oral Early Morning Renato Brannon MD     • tamsulosin  0 4 mg Oral Daily With Luz Marina Gutierrez MD     • traMADol  50 mg Oral Q6H PRN Shayan Roy MD     • verapamil  360 mg Oral Daily Renato Brannon MD          Today, Patient Was Seen By: Maria C Chambers MD    ** Please Note: Dictation voice to text software may have been used in the creation of this document   **

## 2023-03-05 NOTE — CASE MANAGEMENT
Case Management Discharge Planning Note    Patient name Hailee Ortiz  Location [address] [id number] MRN [id number]  : 1965 Date 3/5/2023       Current Admission Date: 3/3/2023  Current Admission Diagnosis:Failure to thrive in adult   Patient Active Problem List    Diagnosis Date Noted   • Failure to thrive in adult 2023   • Depression and anxiety 2023   • Acquired genu varum of right lower extremity 2023   • MARKELL (acute kidney injury) ([facility] ) 2023   • Chronic kidney disease-mineral and bone disorder 2023   • Closed fracture of right tibial plateau 447   • Stage 3b chronic kidney disease ([facility] ) 11/15/2022   • Hyperkalemia 11/15/2022   • Alcoholism ([facility] ) 11/15/2022   • Dupuytren's contracture of right hand 2022   • Type 2 diabetes mellitus with diabetic peripheral angiopathy without gangrene ([place] [address] ) 2022   • Physical deconditioning 2021   • Acquired hypothyroidism 10/18/2021   • Acquired absence of right foot ([facility] ) 2021   • Persistent proteinuria 2021   • Abnormal EKG 2020   • Insomnia 2020   • Anemia 10/08/2020   • Hypomagnesemia 2020   • Urinary retention 2020   • Thyroid cancer ([facility] ) 2020   • PAD (peripheral artery disease) ([place] [address] ) 2020   • Health maintenance examination 2020   • Elevated liver enzymes 10/24/2019   • Colon polyps 2019   • Right-sided tinnitus 2018   • HHS vs DKA 2018   • Hypertension, essential 2018   • Gastroesophageal reflux disease without esophagitis 2018   • Obesity, Class I, BMI 30 0-34 9 (see actual BMI) 2010   • Hyperlipidemia 2009   • History of nonadherence to medical treatment 2008      LOS (days): 2  Geometric Mean LOS (GMLOS) (days):   Days to GMLOS:     OBJECTIVE:  Risk of Unplanned Readmission Score: 19 54         Current admission status: Inpatient   Preferred Pharmacy:   [address] [place] [address] #[id number] [address] Isamar Olivo  [address]  [facility] [address]  Phone: [phone] Fax: [phone] [address] [facility] [address]  Phone: [phone] Fax: [phone]    Primary Care Provider: Stefan Morris MD    Primary Insurance: BLUE CROSS  Secondary Insurance:     DISCHARGE DETAILS:                                          Other Referral/Resources/Interventions Provided:  Referral Comments: PANKAJ informs CM that she is going to have psych re-eval pt tomorrow  If they feel that pt no longer requires an  in-patient psych stay, he will be ready to go to a SNF/acute for rehab  CM spoke to Jenifer from [address] [place] [address] and she will review and let CM know if she can accept  CM extended the deadline for response from facilities in [address] [place] [address]

## 2023-03-05 NOTE — NURSING NOTE
Patient and wife addressed concerns for patients mobility  Patient saw an ortho doctor outpatient for his broken knee in which he wears a brace for  Outpatient ortho doctor mentioned he wasn't a candidate for surgery so he was told to wear the brace and remain non weight bearing to see if it will heal on its own  Patient would like physical therapy to work with him and possibly consult with ortho doctor while here  Patient currently has PT OT consults in

## 2023-03-05 NOTE — CONSULTS
TeleConsultation - [address] 59 y o  male MRN: [id number]  Unit/Bed#: -01 Encounter: [id number]        REQUIRED DOCUMENTATION:     1  This service was provided via Telemedicine  2  Provider located at [facility]   3  TeleMed provider: Angelica Browne MD   4  Identify all parties in room with patient during tele consult: Patient   5  Patient was then informed that this was a Telemedicine visit and that the exam was being conducted confidentially over secure lines  My office door was closed  No one else was in the room  Patient acknowledged consent and understanding of privacy and security of the Telemedicine visit, and gave us permission to have the assistant stay in the room in order to assist with the history and to conduct the exam   I informed the patient that I have reviewed their record in Epic and presented the opportunity for them to ask any questions regarding the visit today  The patient agreed to participate  Discussed with     Assessment/Plan     Principal Problem:    Failure to thrive in adult  Active Problems:    Hypertension, essential    HHS vs DKA    Physical deconditioning    MARKELL (acute kidney injury) ([facility] [address] )    Depression and anxiety    Assessment:  Hailee Ortiz is a 58 y/o male with PMH significant for HTN, CKD III, PAD, Depression, and Anxiety that presented for SI  Patient presenting with SI in the setting of decreased mobility that has led to progressive development of depressive symptoms  Patient's SI is rooted in his inability to fully care for self and recommend proceeding with PT/OT recommendations for TRANG as patient is not acutely suicidal warranting involuntary IP Psychiatric admission and improvement of physical well being would be most important modifiable factor  Patient can be downgraded to virtual 1:1       Major Depressive Disorder, recurrent, moderate    Treatment Plan:    Planned Medication Changes:    -Continue Escitalopram 10 mg qday and Mirtazapine 7 5 mg qhs     Current Medications:     Current Facility-Administered Medications   Medication Dose Route Frequency Provider Last Rate   • acetaminophen  975 mg Oral Q8H PRN Baldemar Kan MD     • aspirin  81 mg Oral Daily Deneen Kelly MD     • atorvastatin  40 mg Oral Daily With Luci Nava MD     • clopidogrel  75 mg Oral Daily Deneen Kelly MD     • escitalopram  10 mg Oral Daily Deneen Kelly MD     • hydrOXYzine HCL  25 mg Oral Q6H PRN Deneen Kelly MD     • insulin glargine  25 Units Subcutaneous Q12H [facility] Baldemar Kan MD     • insulin lispro  1-6 Units Subcutaneous 4x Daily (AC & HS) Baldemar Kan MD     • insulin lispro  10 Units Subcutaneous TID With Meals Baldemar Kan MD     • levothyroxine  150 mcg Oral Daily Deneen Kelly MD     • mirtazapine  7 5 mg Oral HS Deneen Kelly MD     • ondansetron  4 mg Intravenous Q6H PRN Deneen Kelly MD     • pantoprazole  40 mg Oral Early Morning Deneen Kelly MD     • tamsulosin  0 4 mg Oral Daily With Oliver Lynn MD     • traMADol  50 mg Oral Q6H PRN Baldemar Kan MD     • verapamil  360 mg Oral Daily Deneen Kelly MD         Risks / Benefits of Treatment:    Risks, benefits, and possible side effects of medications explained to patient and patient verbalizes understanding        Other treatment modalities ordered as indicated:    · psychotherapy      Inpatient consult to Psychiatry  Consult performed by: Sukhdev Ingram MD  Consult ordered by: Baldemar Kan MD        Physician Requesting Consult: Baldemar Kan MD  Principal Problem:Failure to thrive in adult    Reason for Consult: Psych Evaluation       History of Present Illness        Per Initial Consult by Dr Handley:   Patient is a 59 y o  male who presented to the emergency department where the physician has documented the following: Patient is a 33-year-old male past medical history of hypertension, hyperlipidemia, diabetes, CKD stage III, GERD, thyroid cancer, peripheral artery disease, alcohol abuse presenting with suicidal ideation, failure to thrive  Patient states that roughly 1 month ago he fell down stairs and broke his right knee   States he may have had head trauma at that time however has had multiple falls since then but denies any head trauma and states he does not believe he sustained any injuries in those falls last 3 to 4 days ago  Jarrett Dong states that he has difficulty getting himself up and that his wife cannot get him up  Jarrett Dong states that he often is not able to get to the bathroom in time and has accidents  Jarrett Dong states he cannot care for himself at home because of his injury and his wife cannot either   Notes decreased p o  intake as well as intermittent vomiting and dry heaving in the morning, and diarrhea for the past several days  Jarrett Dong notes suicidal ideation for the past several weeks with plan to shoot himself but states that his wife locked up his firearms   Denies any history of suicide attempts or suicidal ideation, does not take any medicines for psychiatric diseases has never been hospitalized in a psychiatric institution and denies any current drug or alcohol use or auditory visual hallucinations   Does note decreased p o  intake in the last several weeks   Denies any fevers, shortness of breath, rashes, vision changes, dysuria but does note lightheadedness as well as chest discomfort last week  The patient states that due to his falls and loss of independence and fears regarding ambulation, he has become very depressed with suicidal ideation over the last couple of weeks  He states his plan has been to suicide with a gun  He states he can use a gun history but his wife has hidden from him currently  He complains of sleep disruption with decreased appetite greatly decreased energy    He is experiencing anhedonia  He feels very sluggish  Patient states that he is not feeling suicidal and was feeling suicidal because of the way he has been immobilized and unable to function  Patient reports that his desire is to attend rehab as that is the root of his suicidal thoughts  Patient states that he has not had any plan and denied any current SI/HI/AVH nor plan/intent  Patient states that he has not been sleeping well and has had some upset stomach which he is unsure if that is related to his blood sugars or the medication  Psychiatric Review Of Systems:    sleep: yes  appetite changes: yes  weight changes: no  energy/anergy: yes  interest/pleasure/anhedonia: yes  somatic symptoms: no  anxiety/panic: no  salma: no  guilty/hopeless: no  self injurious behavior/risky behavior: no    Historical Information     Past Psychiatric History:     Psychiatric Hospitalizations:   • No history of past inpatient psychiatric admissions  Outpatient Treatment History:   • Denied  Suicide Attempts:   • None  History of self-harm:   • None  Violence History:   • no  Past Psychiatric medication trials: None     Substance Abuse History: Denied         Family Psychiatric History: Denied          Social History:     Education: high school diploma/GED  Learning Disabilities: Denied  Marital history: single  Children: Yes  Living arrangement, social support: The patient lives in home with wife  Occupational History: unknown occupation  Functioning Relationships: good support system    Other Pertinent History: None    Traumatic History:     Abuse: None  Other Traumatic Events: none    Past Medical History:   Diagnosis Date   • Broken internal right knee prosthesis ([facility] ) 01/2023   • Chronic kidney disease    • Colon polyp    • Diabetes mellitus ([facility] )    • Hyperlipidemia    • Hypertension    • Thyroid cancer [facility])        Medical Review Of Systems:    Review of Systems    Meds/Allergies     all current active meds have been reviewed  Allergies Allergen Reactions   • Pollen Extract Eye Swelling     Eyes get watery        Objective     Vital signs in last 24 hours:  Temp:  [97 7 °F (36 5 °C)-98 7 °F (37 1 °C)] 98 5 °F (36 9 °C)  HR:  [63-75] 63  Resp:  [19] 19  BP: ()/(64-68) 99/64      Intake/Output Summary (Last 24 hours) at 3/5/2023 1721  Last data filed at 3/5/2023 1610  Gross per 24 hour   Intake 440 ml   Output 1249 ml   Net -809 ml       Mental Status Evaluation:    Appearance:  age appropriate   Behavior:  normal   Speech:  normal pitch and normal volume   Mood:  normal   Affect:  normal   Language: naming objects   Thought Process:  logical   Associations intact associations   Thought Content:  normal   Perceptual Disturbances: None   Risk Potential: Suicidal Ideations none  Homicidal Ideations none  Potential for Aggression No   Sensorium:  person, place and time/date   Cognition:  recent and remote memory grossly intact   Consciousness:  alert    Attention: attention span and concentration were age appropriate   Intellect: within normal limits   Fund of Knowledge: awareness of current events: President    Insight:  fair   Judgment: fair   Muscle Strength:  Muscle Tone: normal NFT  normal   Gait/Station: normal gait/station   Motor Activity: no abnormal movements       Lab Results: I have personally reviewed all pertinent laboratory/tests results       Most Recent Labs:   Lab Results   Component Value Date    WBC 6 54 03/05/2023    RBC 3 24 (L) 03/05/2023    HGB 10 2 (L) 03/05/2023    HCT 29 8 (L) 03/05/2023     03/05/2023    RDW 12 5 03/05/2023    NEUTROABS 4 12 03/04/2023    SODIUM 133 (L) 03/05/2023    K 3 4 (L) 03/05/2023    CL 99 03/05/2023    CO2 25 03/05/2023    BUN 28 (H) 03/05/2023    CREATININE 1 15 03/05/2023    GLUC 155 (H) 03/05/2023    GLUF 380 (H) 11/14/2022    CALCIUM 8 5 03/05/2023    AST 36 03/03/2023    ALT 33 03/03/2023    ALKPHOS 119 (H) 03/03/2023    TP 7 1 03/03/2023    ALB 3 7 03/03/2023    TBILI 0 85 03/03/2023    CHOLESTEROL 241 (H) 11/14/2022    HDL 36 (L) 11/14/2022    TRIG 238 (H) 11/14/2022    LDLCALC 157 (H) 11/14/2022    [place] 205 11/14/2022    MYK5COWXOFRS 35 400 (H) 11/14/2022    FREET4 0 84 11/14/2022    HGBA1C 11 1 (H) 11/14/2022     11/14/2022       Imaging Studies: XR chest 2 views    Result Date: 3/3/2023  Narrative: CHEST INDICATION:   diminished L lung sounds  COMPARISON:  11/15/2018 EXAM PERFORMED/VIEWS:  XR CHEST PA & LATERAL FINDINGS: Marked patient rotation  Cardiomediastinal silhouette appears unremarkable  The lungs are clear  No pneumothorax or pleural effusion  Osseous structures appear within normal limits for patient age  Impression: No acute cardiopulmonary disease  Workstation performed: [id number]     XR knee 1 or 2 vw right    Result Date: 2/24/2023  Narrative: RIGHT KNEE INDICATION:   S82 141A: Displaced bicondylar fracture of right tibia, initial encounter for closed fracture  COMPARISON:  1/31/2023 VIEWS:  XR KNEE 1 OR 2 VW RIGHT FINDINGS: Reidentified right tibial plateau comminuted fracture with unchanged varus angulation  Fracture lucencies are slightly wider  Bridging callus is not yet present  Persistent joint effusion  No significant degenerative changes  No lytic or blastic osseous lesion  Reidentified popliteal stent  Impression: Reidentified right tibial plateau comminuted fracture with unchanged varus angulation  Fracture lucencies are slightly wider  Bridging callus is not yet present  Workstation performed: [id number]     US kidney and bladder    Result Date: 3/5/2023  Narrative: RENAL ULTRASOUND INDICATION:   urinary retention, evaluate for obstruction  COMPARISON: Renal ultrasound September 2020 TECHNIQUE:   Ultrasound of the retroperitoneum was performed with a curvilinear transducer utilizing volumetric sweeps and still imaging techniques  FINDINGS: KIDNEYS: Symmetric and normal size  Right kidney:  11 1 x 6 2 x 5 2 cm   Volume 189 8 mL Left kidney:  13 1 x 5 5 x 5 9 cm  Volume 222 9 mL Right kidney Normal echogenicity and contour  No mass is identified  No hydronephrosis  No shadowing calculi  No perinephric fluid collections  Left kidney Normal echogenicity and contour  No mass is identified  No hydronephrosis  6 mm calculus is seen in the lower pole of the right kidney  No perinephric fluid collections  URETERS: Nonvisualized  BLADDER: A casey catheter is in place, decompressing the bladder and limiting its evaluation  No focal thickening or mass lesions  Bilateral ureteral jets not detected  Impression: No hydronephrosis  Right lower pole renal calculus  Workstation performed: [id number]     EKG/Pathology/Other Studies:   Lab Results   Component Value Date    VENTRATE 74 03/03/2023    ATRIALRATE 74 03/03/2023    PRINT 174 03/03/2023    QRSDINT 96 03/03/2023    QTINT 484 03/03/2023    QTCINT 537 03/03/2023    PAXIS 26 03/03/2023    QRSAXIS -28 03/03/2023    TWAVEAXIS -29 03/03/2023        Code Status: Level 1 - Full Code  Advance Directive and Living Will:      Power of :    POLST:      Counseling / Coordination of Care: Total floor / unit time spent today 30 minutes  Greater than 50% of total time was spent with the patient and / or family counseling and / or coordination of care  A description of the counseling / coordination of care: Direct Patient Care, Chart Review, and Documentation

## 2023-03-05 NOTE — PLAN OF CARE
Problem: PAIN - ADULT  Goal: Verbalizes/displays adequate comfort level or baseline comfort level  Description: Interventions:  - Encourage patient to monitor pain and request assistance  - Assess pain using appropriate pain scale  - Administer analgesics based on type and severity of pain and evaluate response  - Implement non-pharmacological measures as appropriate and evaluate response  - Consider cultural and social influences on pain and pain management  - Notify physician/advanced practitioner if interventions unsuccessful or patient reports new pain  Outcome: Progressing     Problem: Knowledge Deficit  Goal: Patient/family/caregiver demonstrates understanding of disease process, treatment plan, medications, and discharge instructions  Description: Complete learning assessment and assess knowledge base  Interventions:  - Provide teaching at level of understanding  - Provide teaching via preferred learning methods  Outcome: Progressing     Problem: Nutrition/Hydration-ADULT  Goal: Nutrient/Hydration intake appropriate for improving, restoring or maintaining nutritional needs  Description: Monitor and assess patient's nutrition/hydration status for malnutrition  Collaborate with interdisciplinary team and initiate plan and interventions as ordered  Monitor patient's weight and dietary intake as ordered or per policy  Utilize nutrition screening tool and intervene as necessary  Determine patient's food preferences and provide high-protein, high-caloric foods as appropriate       INTERVENTIONS:  - Monitor oral intake, urinary output, labs, and treatment plans  - Assess nutrition and hydration status and recommend course of action  - Evaluate amount of meals eaten  - Assist patient with eating if necessary   - Allow adequate time for meals  - Recommend/ encourage appropriate diets, oral nutritional supplements, and vitamin/mineral supplements  - Order, calculate, and assess calorie counts as needed  - Recommend, monitor, and adjust tube feedings and TPN/PPN based on assessed needs  - Assess need for intravenous fluids  - Provide specific nutrition/hydration education as appropriate  - Include patient/family/caregiver in decisions related to nutrition  Outcome: Progressing

## 2023-03-06 LAB
ANION GAP SERPL CALCULATED.3IONS-SCNC: 8 MMOL/L (ref 4–13)
BUN SERPL-MCNC: 18 MG/DL (ref 5–25)
CALCIUM SERPL-MCNC: 8.9 MG/DL (ref 8.4–10.2)
CHLORIDE SERPL-SCNC: 102 MMOL/L (ref 96–108)
CO2 SERPL-SCNC: 23 MMOL/L (ref 21–32)
CREAT SERPL-MCNC: 0.98 MG/DL (ref 0.6–1.3)
ERYTHROCYTE [DISTWIDTH] IN BLOOD BY AUTOMATED COUNT: 12.5 % (ref 11.6–15.1)
GFR SERPL CREATININE-BSD FRML MDRD: 84 ML/MIN/1.73SQ M
GLUCOSE SERPL-MCNC: 125 MG/DL (ref 65–140)
GLUCOSE SERPL-MCNC: 131 MG/DL (ref 65–140)
GLUCOSE SERPL-MCNC: 147 MG/DL (ref 65–140)
GLUCOSE SERPL-MCNC: 201 MG/DL (ref 65–140)
GLUCOSE SERPL-MCNC: 205 MG/DL (ref 65–140)
HCT VFR BLD AUTO: 30.2 % (ref 36.5–49.3)
HGB BLD-MCNC: 10.5 G/DL (ref 12–17)
MCH RBC QN AUTO: 31.3 PG (ref 26.8–34.3)
MCHC RBC AUTO-ENTMCNC: 34.8 G/DL (ref 31.4–37.4)
MCV RBC AUTO: 90 FL (ref 82–98)
PLATELET # BLD AUTO: 186 THOUSANDS/UL (ref 149–390)
PMV BLD AUTO: 10.6 FL (ref 8.9–12.7)
POTASSIUM SERPL-SCNC: 3.7 MMOL/L (ref 3.5–5.3)
RBC # BLD AUTO: 3.35 MILLION/UL (ref 3.88–5.62)
SODIUM SERPL-SCNC: 133 MMOL/L (ref 135–147)
WBC # BLD AUTO: 6.05 THOUSAND/UL (ref 4.31–10.16)

## 2023-03-06 RX ADMIN — INSULIN LISPRO 2 UNITS: 100 INJECTION, SOLUTION INTRAVENOUS; SUBCUTANEOUS at 21:35

## 2023-03-06 RX ADMIN — ASPIRIN 81 MG 81 MG: 81 TABLET ORAL at 08:48

## 2023-03-06 RX ADMIN — TAMSULOSIN HYDROCHLORIDE 0.4 MG: 0.4 CAPSULE ORAL at 16:22

## 2023-03-06 RX ADMIN — INSULIN LISPRO 10 UNITS: 100 INJECTION, SOLUTION INTRAVENOUS; SUBCUTANEOUS at 12:10

## 2023-03-06 RX ADMIN — PANTOPRAZOLE SODIUM 40 MG: 40 TABLET, DELAYED RELEASE ORAL at 05:01

## 2023-03-06 RX ADMIN — INSULIN LISPRO 2 UNITS: 100 INJECTION, SOLUTION INTRAVENOUS; SUBCUTANEOUS at 12:10

## 2023-03-06 RX ADMIN — ACETAMINOPHEN 975 MG: 325 TABLET, FILM COATED ORAL at 14:33

## 2023-03-06 RX ADMIN — ESCITALOPRAM OXALATE 10 MG: 10 TABLET ORAL at 08:48

## 2023-03-06 RX ADMIN — VERAPAMIL HYDROCHLORIDE 360 MG: 180 TABLET ORAL at 08:48

## 2023-03-06 RX ADMIN — CLOPIDOGREL BISULFATE 75 MG: 75 TABLET ORAL at 08:48

## 2023-03-06 RX ADMIN — INSULIN GLARGINE 25 UNITS: 100 INJECTION, SOLUTION SUBCUTANEOUS at 08:48

## 2023-03-06 RX ADMIN — INSULIN GLARGINE 25 UNITS: 100 INJECTION, SOLUTION SUBCUTANEOUS at 21:35

## 2023-03-06 RX ADMIN — ATORVASTATIN CALCIUM 40 MG: 40 TABLET, FILM COATED ORAL at 16:22

## 2023-03-06 RX ADMIN — LEVOTHYROXINE SODIUM 150 MCG: 150 TABLET ORAL at 08:48

## 2023-03-06 NOTE — ASSESSMENT & PLAN NOTE
· Hx of fall and knee injury last month, unable to take care of self at home  · right tibial plateau comminuted fracture being treated conservatively by ortho outpatient   · PT/OT evaluation recommending rehab   · CM for placement

## 2023-03-06 NOTE — ASSESSMENT & PLAN NOTE
· Mentioned SI however now denying any more SI   · Evaluated by Psychiatry in the ED, recommending inpatient psych admission initially  Patient agreeable  · Started on lexapro 10mg, remeron 7 5mg, atarax 25mg q6h PRN  · Asked psych to re evaluate patient for disposition as patient expressed no further SI as his feeling mainly related to not being able to care for himself and is very motivated to go to rehab   Psych agreed that patient will benefit from rehab and no need for inpatient psych admission at this point

## 2023-03-06 NOTE — NURSING NOTE
Virtual Observation discontinued per providers order  Patient is awake, quiet, and resting  Patient given instructions on how and when to call the nurse if needed  Will continue constant rounding and continue plan of care

## 2023-03-06 NOTE — NURSING NOTE
Patient cleared to be removed from continual observation and transitioned to virtual 1:1 per psychiatric evaluation on 3/5

## 2023-03-06 NOTE — PROGRESS NOTES
[facility]  Progress Note - Omar Storey 1965, 59 y o  male MRN: [id number]  Unit/Bed#: MS Jerry Encounter: [id number]  Primary Care Provider: Lg Mata MD   Date and time admitted to hospital: 3/3/2023 11:45 AM    * MARKELL (acute kidney injury) [facility])  Assessment & Plan  · Suspect in the setting of dehydration, poor oral intake, GI losses also possible obstruction  · S/p IV hydration  · Monitor BMP, creatinine improving  · Noted urinary retention on bladder scan requiring cath  ultrasound kidney/bladder negative for obstruction  Started Flomax  · Holding home irbesartan/hctz  · Mae catheter inserted 3/4  Voiding trial successful on 3/5    Depression and anxiety  Assessment & Plan  · Mentioned SI however now denying any more SI   · Evaluated by Psychiatry in the ED, recommending inpatient psych admission initially  Patient agreeable  · Started on lexapro 10mg, remeron 7 5mg, atarax 25mg q6h PRN  · Asked psych to re evaluate patient for disposition as patient expressed no further SI as his feeling mainly related to not being able to care for himself and is very motivated to go to rehab  Psych agreed that patient will benefit from rehab and no need for inpatient psych admission at this point      Physical deconditioning  Assessment & Plan  · Hx of fall and knee injury last month, unable to take care of self at home  · right tibial plateau comminuted fracture being treated conservatively by ortho outpatient   · PT/OT evaluation recommending rehab   · CM for placement    HHS vs DKA  Assessment & Plan  Lab Results   Component Value Date    HGBA1C 11 1 (H) 11/14/2022       Recent Labs     03/05/23  1606 03/05/23 2023 03/06/23  0721 03/06/23  1105   POCGLU 105 180* 125 205*       Blood Sugar Average: Last 72 hrs:  (P) 175     · Gap closed    · Transitioned to subcutaneous basal/bolus insulin  · Insulin sliding scale with Accu-Cheks  · Diabetic diet    Hypertension, essential  Assessment & Plan  · Holding home irberstan/hctz in the setting of MARKELL  · Continue home verapamil    VTE Pharmacologic Prophylaxis:   Pharmacologic: low risk  Mechanical VTE Prophylaxis in Place: Yes    Review of Systems:    Review of Systems   Constitutional: Negative for chills and fever  HENT: Negative for ear pain and sore throat  Eyes: Negative for pain and visual disturbance  Respiratory: Negative for cough and shortness of breath  Cardiovascular: Negative for chest pain and palpitations  Gastrointestinal: Negative for abdominal pain and vomiting  Genitourinary: Negative for dysuria and hematuria  Musculoskeletal: Negative for arthralgias and back pain  Skin: Negative for color change and rash  Neurological: Negative for seizures and syncope  All other systems reviewed and are negative        Past Medical and Surgical History:     Past Medical History:   Diagnosis Date   • Broken internal right knee prosthesis ([facility] ) 01/2023   • Chronic kidney disease    • Colon polyp    • Diabetes mellitus ([facility] )    • Hyperlipidemia    • Hypertension    • Thyroid cancer [facility])        Past Surgical History:   Procedure Laterality Date   • COLONOSCOPY     • FOOT AMPUTATION Right    • FOOT SURGERY Right 2014   • IR AORTAGRAM WITH RUN-OFF  08/06/2020   • IR TUNNELED CENTRAL LINE PLACEMENT  09/08/2020   • IR TUNNELED CENTRAL LINE REMOVAL  09/28/2020   • IN AMPUTATION FOOT TRANSMETARSAL Right 09/03/2020    Procedure: AMPUTATION TRANSMETATARSAL (TMA);  Surgeon: Ericka Nichole DPM;  Location: [facility];  Service: Podiatry   • IN THYROIDECTOMY TOTAL/COMPLETE N/A 02/03/2021    Procedure: TOTAL THYROIDECTOMY WITH NIMS MONITORING;  Surgeon: Petra Miner MD;  Location: [facility];  Service: ENT   • TOE AMPUTATION Right 08/11/2020    Procedure: AMPUTATION TOE;  Surgeon: Ericka Nichole DPM;  Location: [facility];  Service: Podiatry   • Atul 634 THYROID BIOPSY  07/02/2020       Patient Centered Rounds: I have performed bedside rounds with nursing staff today  Discussions with Specialists or Other Care Team Provider: nursing, CM    Education and Discussions with Family / Patient: patient     Time Spent for Care: 45 minutes  More than 50% of total time spent on counseling and coordination of care as described above  Current Length of Stay: 3 day(s)    Current Patient Status: Inpatient   Certification Statement: The patient will continue to require additional inpatient hospital stay due to awaiting rehab     Discharge Plan: medically stable awaiting rehab    Code Status: Level 1 - Full Code      Subjective:   No complaints     Objective:     Vitals:   Temp (24hrs), Av °F (36 7 °C), Min:98 °F (36 7 °C), Max:98 °F (36 7 °C)    Temp:  [98 °F (36 7 °C)] 98 °F (36 7 °C)  HR:  [64-73] 73  Resp:  [18] 18  BP: (116-134)/(75-77) 116/75  SpO2:  [91 %-94 %] 92 %  Body mass index is 33 62 kg/m²  Input and Output Summary (last 24 hours): Intake/Output Summary (Last 24 hours) at 3/6/2023 1553  Last data filed at 3/6/2023 1435  Gross per 24 hour   Intake 390 ml   Output 1459 ml   Net -1069 ml       Physical Exam:     Physical Exam  Vitals and nursing note reviewed  Constitutional:       General: He is not in acute distress  Appearance: He is well-developed  He is not ill-appearing or diaphoretic  HENT:      Head: Normocephalic and atraumatic  Mouth/Throat:      Mouth: Mucous membranes are moist       Pharynx: Oropharynx is clear  Eyes:      General: No scleral icterus  Conjunctiva/sclera: Conjunctivae normal    Cardiovascular:      Rate and Rhythm: Normal rate and regular rhythm  Heart sounds: No murmur heard  Pulmonary:      Effort: Pulmonary effort is normal  No respiratory distress  Breath sounds: Normal breath sounds  No wheezing or rales  Abdominal:      General: Bowel sounds are normal       Palpations: Abdomen is soft  Tenderness: There is no abdominal tenderness  Musculoskeletal:         General: No swelling  Cervical back: Normal range of motion and neck supple  Right lower leg: No edema  Left lower leg: No edema  Skin:     General: Skin is warm and dry  Capillary Refill: Capillary refill takes less than 2 seconds  Neurological:      General: No focal deficit present  Mental Status: He is alert and oriented to person, place, and time  Psychiatric:         Mood and Affect: Mood normal          Behavior: Behavior normal            Additional Data:     Labs:    Results from last 7 days   Lab Units 03/06/23  0451 03/05/23  0442 03/04/23  0555   WBC Thousand/uL 6 05   < > 7 24   HEMOGLOBIN g/dL 10 5*   < > 10 7*   HEMATOCRIT % 30 2*   < > 30 6*   PLATELETS Thousands/uL 186   < > 188   NEUTROS PCT %  --   --  57   LYMPHS PCT %  --   --  26   MONOS PCT %  --   --  13*   EOS PCT %  --   --  2    < > = values in this interval not displayed  Results from last 7 days   Lab Units 03/06/23  0451 03/03/23  1524 03/03/23  1243   SODIUM mmol/L 133*   < > 129*   POTASSIUM mmol/L 3 7   < > 3 9   CHLORIDE mmol/L 102   < > 90*   CO2 mmol/L 23   < > 22   BUN mg/dL 18   < > 55*   CREATININE mg/dL 0 98   < > 2 11*   ANION GAP mmol/L 8   < > 17*   CALCIUM mg/dL 8 9   < > 9 1   ALBUMIN g/dL  --   --  3 7   TOTAL BILIRUBIN mg/dL  --   --  0 85   ALK PHOS U/L  --   --  119*   ALT U/L  --   --  33   AST U/L  --   --  36   GLUCOSE RANDOM mg/dL 131   < > 300*    < > = values in this interval not displayed  Results from last 7 days   Lab Units 03/06/23  1105 03/06/23  0721 03/05/23  2023 03/05/23  1606 03/05/23  1446 03/05/23  1102 03/05/23  0712 03/04/23  2050 03/04/23  1540 03/04/23  1410 03/04/23  1200 03/04/23  1003   POC GLUCOSE mg/dl 205* 125 180* 105 132 151* 142* 143* 158* 179* 169* 190*                   * I Have Reviewed All Lab Data Listed Above  * Additional Pertinent Lab Tests Reviewed:  Pradeep [address] Admission Reviewed    Imaging:    Imaging Reports Reviewed Today Include: no  Imaging Personally Reviewed by Myself Includes:  no    Recent Cultures (last 7 days):     Results from last 7 days   Lab Units 03/04/23  1320   C DIFF TOXIN B BY PCR  Negative       Last 24 Hours Medication List:   Current Facility-Administered Medications   Medication Dose Route Frequency Provider Last Rate   • acetaminophen  975 mg Oral Q8H PRN Bishop Barron MD     • aspirin  81 mg Oral Daily Jozef Obando MD     • atorvastatin  40 mg Oral Daily With Troy Almeida MD     • clopidogrel  75 mg Oral Daily Jozef Obando MD     • escitalopram  10 mg Oral Daily Jozef Obando MD     • hydrOXYzine HCL  25 mg Oral Q6H PRN Jozef Obando MD     • insulin glargine  25 Units Subcutaneous Q12H [facility] Bishop Barron MD     • insulin lispro  1-6 Units Subcutaneous 4x Daily (AC & HS) Bishop Barron MD     • insulin lispro  10 Units Subcutaneous TID With Meals Bishop Barron MD     • levothyroxine  150 mcg Oral Daily Jozef Obando MD     • mirtazapine  7 5 mg Oral HS Jozef Obando MD     • ondansetron  4 mg Intravenous Q6H PRN Jozef Obando MD     • pantoprazole  40 mg Oral Early Morning Jozef Obando MD     • tamsulosin  0 4 mg Oral Daily With Bertha Thrasher MD     • traMADol  50 mg Oral Q6H PRN Bishop Barron MD     • verapamil  360 mg Oral Daily Jozef Obando MD          Today, Patient Was Seen By: Barney Fall MD    ** Please Note: Dictation voice to text software may have been used in the creation of this document   **

## 2023-03-06 NOTE — NURSING NOTE
Patient refused medications remeron and sliding scale insulin at bedtime  Patient extensively educated about the importance of his medications but still refused to take them

## 2023-03-06 NOTE — ASSESSMENT & PLAN NOTE
· Reports inability to care for self at home, wife also unable to take care of patient  · Reports history of fall with knee injury about a month ago, multiple history of falls, also complaining of weakness, poor oral intake, difficulty controlling erratic sugar levels  · For placement, seen by PT/OT and recommending rehab

## 2023-03-06 NOTE — ASSESSMENT & PLAN NOTE
· Suspect in the setting of dehydration, poor oral intake, GI losses also possible obstruction  · S/p IV hydration  · Monitor BMP, creatinine improving  · Noted urinary retention on bladder scan requiring cath  ultrasound kidney/bladder negative for obstruction  Started Flomax  · Holding home irbesartan/hctz  · Mae catheter inserted 3/4   Voiding trial successful on 3/5

## 2023-03-06 NOTE — ASSESSMENT & PLAN NOTE
Lab Results   Component Value Date    HGBA1C 11 1 (H) 11/14/2022       Recent Labs     03/05/23  1606 03/05/23 2023 03/06/23  0721 03/06/23  1105   POCGLU 105 180* 125 205*       Blood Sugar Average: Last 72 hrs:  (P) 175     · Gap closed    · Transitioned to subcutaneous basal/bolus insulin  · Insulin sliding scale with Accu-Cheks  · Diabetic diet

## 2023-03-06 NOTE — CASE MANAGEMENT
Case Management Discharge Planning Note    Patient name Eliana Juárez  Location [address] 332/-[id number] MRN [id number]  : 1965 Date 3/6/2023       Current Admission Date: 3/3/2023  Current Admission Diagnosis:MARKELL (acute kidney injury) [facility])   Patient Active Problem List    Diagnosis Date Noted   • Failure to thrive in adult 2023   • Depression and anxiety 2023   • Acquired genu varum of right lower extremity 2023   • MARKELL (acute kidney injury) ([facility] ) 2023   • Chronic kidney disease-mineral and bone disorder 2023   • Closed fracture of right tibial plateau    • Stage 3b chronic kidney disease ([facility] ) 11/15/2022   • Hyperkalemia 11/15/2022   • Alcoholism ([facility] ) 11/15/2022   • Dupuytren's contracture of right hand 2022   • Type 2 diabetes mellitus with diabetic peripheral angiopathy without gangrene ([facility] ) 2022   • Physical deconditioning 2021   • Acquired hypothyroidism 10/18/2021   • Acquired absence of right foot ([facility] ) 2021   • Persistent proteinuria 2021   • Abnormal EKG 2020   • Insomnia 2020   • Anemia 10/08/2020   • Hypomagnesemia 2020   • Urinary retention 2020   • Thyroid cancer ([facility] ) 2020   • PAD (peripheral artery disease) ([facility] ) 2020   • Health maintenance examination 2020   • Elevated liver enzymes 10/24/2019   • Colon polyps 2019   • Right-sided tinnitus 2018   • HHS vs DKA 2018   • Hypertension, essential 2018   • Gastroesophageal reflux disease without esophagitis 2018   • Obesity, Class I, BMI 30 0-34 9 (see actual BMI) 2010   • Hyperlipidemia 2009   • History of nonadherence to medical treatment 2008      LOS (days): 3  Geometric Mean LOS (GMLOS) (days):   Days to GMLOS:     OBJECTIVE:  Risk of Unplanned Readmission Score: 17 68         Current admission status: Inpatient   Preferred Pharmacy:   [address] [place] [address] [facility] [address] [address]  Phone: [phone] Fax: [phone] [place], [facility] [address]  Phone: [phone] Fax: [phone]    Primary Care Provider: Murali Enriquez MD    Primary Insurance: BLUE CROSS  Secondary Insurance:     DISCHARGE DETAILS:     CM contacted wilfrid from [facility] denied due to insurance company not having a contract with them  Cm contacted crissy from [facility] to see if they would consider, those facilities are a strong no  CM extended referrals and has currently been communicating with [facility] but there is no guarantee  Patient is not eligible for ARC at the moment

## 2023-03-06 NOTE — UTILIZATION REVIEW
NOTIFICATION OF INPATIENT ADMISSION   AUTHORIZATION REQUEST   SERVICING FACILITY:   [address] [place] [address]  Tax ID: [id number]  NPI: [id number] ATTENDING PROVIDER:  Attending Name and NPI#: Deon Coker Md [[id number]]  Address: [address] [place] [address]  Phone: [phone]     ADMISSION INFORMATION:  Place of Service: [place] [address]  Place of Service Code: 21  Inpatient Admission Date/Time: 3/3/23  6:02 PM  Discharge Date/Time: No discharge date for patient encounter  Admitting Diagnosis Code/Description:  Suicidal ideation [R45 851]  Ketosis ([address] ) [E88 89]  Hyperglycemia [R73 9]  Failure to thrive in adult [R62 7]  Encounter for psychological evaluation [Z00 8]  Type 2 diabetes mellitus with diabetic peripheral angiopathy without gangrene, with long-term current use of insulin ([address] ) [E11 51, Z79 4]     UTILIZATION REVIEW CONTACT:  Emily Wheat Utilization   Network Utilization Review Department  Phone: [phone]  Fax [phone]  Email: [email or web address]  Contact for approvals/pending authorizations, clinical reviews, and discharge  PHYSICIAN ADVISORY SERVICES:  Medical Necessity Denial & [employer] Review  Phone: [phone]  Fax: [phone]  Email: [email or web address]

## 2023-03-07 LAB
ANION GAP SERPL CALCULATED.3IONS-SCNC: 8 MMOL/L (ref 4–13)
BUN SERPL-MCNC: 16 MG/DL (ref 5–25)
CALCIUM SERPL-MCNC: 8.9 MG/DL (ref 8.4–10.2)
CHLORIDE SERPL-SCNC: 101 MMOL/L (ref 96–108)
CO2 SERPL-SCNC: 26 MMOL/L (ref 21–32)
CREAT SERPL-MCNC: 0.92 MG/DL (ref 0.6–1.3)
ERYTHROCYTE [DISTWIDTH] IN BLOOD BY AUTOMATED COUNT: 12.6 % (ref 11.6–15.1)
GFR SERPL CREATININE-BSD FRML MDRD: 90 ML/MIN/1.73SQ M
GLUCOSE SERPL-MCNC: 114 MG/DL (ref 65–140)
GLUCOSE SERPL-MCNC: 117 MG/DL (ref 65–140)
GLUCOSE SERPL-MCNC: 132 MG/DL (ref 65–140)
GLUCOSE SERPL-MCNC: 164 MG/DL (ref 65–140)
GLUCOSE SERPL-MCNC: 84 MG/DL (ref 65–140)
HCT VFR BLD AUTO: 30.2 % (ref 36.5–49.3)
HGB BLD-MCNC: 10.3 G/DL (ref 12–17)
MCH RBC QN AUTO: 31.5 PG (ref 26.8–34.3)
MCHC RBC AUTO-ENTMCNC: 34.1 G/DL (ref 31.4–37.4)
MCV RBC AUTO: 92 FL (ref 82–98)
PLATELET # BLD AUTO: 207 THOUSANDS/UL (ref 149–390)
PMV BLD AUTO: 10.2 FL (ref 8.9–12.7)
POTASSIUM SERPL-SCNC: 3.7 MMOL/L (ref 3.5–5.3)
RBC # BLD AUTO: 3.27 MILLION/UL (ref 3.88–5.62)
SODIUM SERPL-SCNC: 135 MMOL/L (ref 135–147)
WBC # BLD AUTO: 4.8 THOUSAND/UL (ref 4.31–10.16)

## 2023-03-07 RX ADMIN — INSULIN LISPRO 10 UNITS: 100 INJECTION, SOLUTION INTRAVENOUS; SUBCUTANEOUS at 12:20

## 2023-03-07 RX ADMIN — ASPIRIN 81 MG 81 MG: 81 TABLET ORAL at 09:39

## 2023-03-07 RX ADMIN — INSULIN LISPRO 1 UNITS: 100 INJECTION, SOLUTION INTRAVENOUS; SUBCUTANEOUS at 22:01

## 2023-03-07 RX ADMIN — INSULIN LISPRO 10 UNITS: 100 INJECTION, SOLUTION INTRAVENOUS; SUBCUTANEOUS at 09:32

## 2023-03-07 RX ADMIN — CLOPIDOGREL BISULFATE 75 MG: 75 TABLET ORAL at 09:39

## 2023-03-07 RX ADMIN — PANTOPRAZOLE SODIUM 40 MG: 40 TABLET, DELAYED RELEASE ORAL at 05:32

## 2023-03-07 RX ADMIN — INSULIN GLARGINE 25 UNITS: 100 INJECTION, SOLUTION SUBCUTANEOUS at 09:32

## 2023-03-07 RX ADMIN — INSULIN GLARGINE 25 UNITS: 100 INJECTION, SOLUTION SUBCUTANEOUS at 22:01

## 2023-03-07 RX ADMIN — TAMSULOSIN HYDROCHLORIDE 0.4 MG: 0.4 CAPSULE ORAL at 16:15

## 2023-03-07 RX ADMIN — ESCITALOPRAM OXALATE 10 MG: 10 TABLET ORAL at 09:39

## 2023-03-07 RX ADMIN — ATORVASTATIN CALCIUM 40 MG: 40 TABLET, FILM COATED ORAL at 16:15

## 2023-03-07 RX ADMIN — LEVOTHYROXINE SODIUM 150 MCG: 150 TABLET ORAL at 09:39

## 2023-03-07 NOTE — NURSING NOTE
Pt refusing night time vitals  Educated the patient on importance of vital signs  Pt instructed PCA and RN to leave  Pt resting comfortably in bed  Bed alarm on  Virtual 1:1 in place

## 2023-03-07 NOTE — ASSESSMENT & PLAN NOTE
Lab Results   Component Value Date    HGBA1C 11 1 (H) 11/14/2022       Recent Labs     03/06/23  1105 03/06/23  1622 03/06/23 2047 03/07/23  0749   POCGLU 205* 147* 201* 117       Blood Sugar Average: Last 72 hrs:  (P) 158 [id number]     · Gap closed    · Transitioned to subcutaneous basal/bolus insulin  · Insulin sliding scale with Accu-Cheks  · Diabetic diet

## 2023-03-07 NOTE — PROGRESS NOTES
[address] [facility]  Progress Note - Emily Laytondon 1965, 59 y o  male MRN: [id number]  Unit/Bed#: MS Jerry Encounter: [id number]  Primary Care Provider: Sarah Phillips MD   Date and time admitted to hospital: 3/3/2023 11:45 AM    * MRAKELL (acute kidney injury) [facility])  Assessment & Plan  · Likely pre renal in setting of hypovolemia while on home diuretics  · Now resolved with IV hydration  · Continue to hold home diuretics for now  · Renal US without evidence of obstruction  · Monitor creatinine     HHS vs DKA  Assessment & Plan  Lab Results   Component Value Date    HGBA1C 11 1 (H) 11/14/2022       Recent Labs     03/06/23  1105 03/06/23  1622 03/06/23  2047 03/07/23  0749   POCGLU 205* 147* 201* 117       Blood Sugar Average: Last 72 hrs:  (P) 158 [id number]     · Gap closed  · Transitioned to subcutaneous basal/bolus insulin  · Insulin sliding scale with Accu-Cheks  · Diabetic diet    Depression and anxiety  Assessment & Plan  · Mentioned SI however now denying any more SI   · Evaluated by Psychiatry in the ED, recommending inpatient psych admission initially  Patient agreeable  · Started on lexapro 10mg, remeron 7 5mg, atarax 25mg q6h PRN  · Asked psych to re evaluate patient for disposition as patient expressed no further SI as his feeling mainly related to not being able to care for himself and is very motivated to go to rehab   Psych agreed that patient will benefit from rehab and no need for inpatient psych admission at this point      Physical deconditioning  Assessment & Plan  · Hx of fall and knee injury last month, unable to take care of self at home  · right tibial plateau comminuted fracture being treated conservatively by ortho outpatient   · PT/OT evaluation recommending rehab   · CM for placement    Hypertension, essential  Assessment & Plan  · Currently adequately controlled off meds  · Continue to hold        VTE Pharmacologic Prophylaxis: VTE Score: 2 Low Risk (Score 0-2) - Encourage Ambulation  Patient Centered Rounds: I performed bedside rounds with nursing staff today  Discussions with Specialists or Other Care Team Provider: ana    Education and Discussions with Family / Patient: Patient declined call to   Total Time Spent on Date of Encounter in care of patient: 35 minutes This time was spent on one or more of the following: performing physical exam; counseling and coordination of care; obtaining or reviewing history; documenting in the medical record; reviewing/ordering tests, medications or procedures; communicating with other healthcare professionals and discussing with patient's family/caregivers  Current Length of Stay: 4 day(s)  Current Patient Status: Inpatient   Certification Statement: The patient will continue to require additional inpatient hospital stay due to placement  Discharge Plan: Anticipate discharge tomorrow to rehab facility  Code Status: Level 1 - Full Code    Subjective:   Patient feels well     Objective:     Vitals:   Temp (24hrs), Av 8 °F (36 6 °C), Min:97 6 °F (36 4 °C), Max:98 °F (36 7 °C)    Temp:  [97 6 °F (36 4 °C)-98 °F (36 7 °C)] 97 6 °F (36 4 °C)  HR:  [62-78] 78  Resp:  [17-18] 17  BP: (103-125)/(66-77) 103/66  SpO2:  [95 %-96 %] 96 %  Body mass index is 33 62 kg/m²  Input and Output Summary (last 24 hours): Intake/Output Summary (Last 24 hours) at 3/7/2023 0958  Last data filed at 3/7/2023 0901  Gross per 24 hour   Intake 650 ml   Output 1000 ml   Net -350 ml       Physical Exam:   Physical Exam  Constitutional:       General: He is not in acute distress  Appearance: Normal appearance  He is not toxic-appearing  Cardiovascular:      Rate and Rhythm: Normal rate and regular rhythm  Heart sounds: Normal heart sounds  No murmur heard  Pulmonary:      Effort: Pulmonary effort is normal  No respiratory distress  Breath sounds: Normal breath sounds  No wheezing     Abdominal:      General: Abdomen is flat  There is no distension  Palpations: Abdomen is soft  Tenderness: There is no abdominal tenderness  Neurological:      General: No focal deficit present  Mental Status: He is alert and oriented to person, place, and time  Mental status is at baseline  Motor: No weakness  Additional Data:     Labs:  Results from last 7 days   Lab Units 03/07/23  0542 03/05/23  0442 03/04/23  0555   WBC Thousand/uL 4 80   < > 7 24   HEMOGLOBIN g/dL 10 3*   < > 10 7*   HEMATOCRIT % 30 2*   < > 30 6*   PLATELETS Thousands/uL 207   < > 188   NEUTROS PCT %  --   --  57   LYMPHS PCT %  --   --  26   MONOS PCT %  --   --  13*   EOS PCT %  --   --  2    < > = values in this interval not displayed  Results from last 7 days   Lab Units 03/07/23  0542 03/03/23  1524 03/03/23  1243   SODIUM mmol/L 135   < > 129*   POTASSIUM mmol/L 3 7   < > 3 9   CHLORIDE mmol/L 101   < > 90*   CO2 mmol/L 26   < > 22   BUN mg/dL 16   < > 55*   CREATININE mg/dL 0 92   < > 2 11*   ANION GAP mmol/L 8   < > 17*   CALCIUM mg/dL 8 9   < > 9 1   ALBUMIN g/dL  --   --  3 7   TOTAL BILIRUBIN mg/dL  --   --  0 85   ALK PHOS U/L  --   --  119*   ALT U/L  --   --  33   AST U/L  --   --  36   GLUCOSE RANDOM mg/dL 114   < > 300*    < > = values in this interval not displayed  Results from last 7 days   Lab Units 03/07/23  0749 03/06/23  2047 03/06/23  1622 03/06/23  1105 03/06/23  0721 03/05/23  2023 03/05/23  1606 03/05/23  1446 03/05/23  1102 03/05/23  0712 03/04/23  2050 03/04/23  1540   POC GLUCOSE mg/dl 117 201* 147* 205* 125 180* 105 132 151* 142* 143* 158*               Lines/Drains:  Invasive Devices     Peripheral Intravenous Line  Duration           Peripheral IV 03/07/23 Dorsal (posterior); Right Hand <1 day                      Imaging: No pertinent imaging reviewed      Recent Cultures (last 7 days):   Results from last 7 days   Lab Units 03/04/23  1320   C DIFF TOXIN B BY PCR  Negative       Last 24 Hours Medication List:   Current Facility-Administered Medications   Medication Dose Route Frequency Provider Last Rate   • acetaminophen  975 mg Oral Q8H PRN Bishop Barron MD     • aspirin  81 mg Oral Daily Jozef bOando MD     • atorvastatin  40 mg Oral Daily With Troy Almeida MD     • clopidogrel  75 mg Oral Daily Jozef Obando MD     • escitalopram  10 mg Oral Daily Jozef Obando MD     • hydrOXYzine HCL  25 mg Oral Q6H PRN Jozef Obando MD     • insulin glargine  25 Units Subcutaneous Q12H [address] Bishop Barron MD     • insulin lispro  1-6 Units Subcutaneous 4x Daily (AC & HS) Bishop Barron MD     • insulin lispro  10 Units Subcutaneous TID With Meals Bishop Barron MD     • levothyroxine  150 mcg Oral Daily Jozef Obando MD     • mirtazapine  7 5 mg Oral HS Jozef Obando MD     • ondansetron  4 mg Intravenous Q6H PRN Jozef Obando MD     • pantoprazole  40 mg Oral Early Morning Jozef Obando MD     • tamsulosin  0 4 mg Oral Daily With Bertha Thrasher MD     • traMADol  50 mg Oral Q6H PRN Bishop Barron MD     • verapamil  360 mg Oral Daily Jozef Obando MD          Today, Patient Was Seen By: Rubin Justice MD    **Please Note: This note may have been constructed using a voice recognition system  **

## 2023-03-07 NOTE — PLAN OF CARE
Problem: PHYSICAL THERAPY ADULT  Goal: Performs mobility at highest level of function for planned discharge setting  See evaluation for individualized goals  Description: Treatment/Interventions: Functional transfer training, LE strengthening/ROM, Therapeutic exercise, Endurance training, Patient/family training, Bed mobility, Continued evaluation, Spoke to nursing, OT          See flowsheet documentation for full assessment, interventions and recommendations  Outcome: Progressing  Note: Prognosis: Good  Problem List: Decreased strength, Impaired balance, Decreased mobility, Decreased endurance, Orthopedic restrictions  Assessment: Chart reviewed  Patient was received supine in bed in NAD and agreeable to PT session  Today's PT treatment session consisted of therapeutic activity for facilitation of transitional movements and safe performance of correct technique for bed mobility and sit pivot transfers and therapeutic exercise to increase lower extremity muscle strength  In comparison to the previous session the patient has made progress as evident by requiring decreased assistance with bed mobility  Pt was also able to perform a sit pivot transfer with assist of two  Pt requires verbal cues to maintain NWB on R LE  Pt refused to perform sit to stand transfer at this time  Pt tolerated therapeutic exercise well, but required frequent seated rest breaks  Overall, patient tolerated today's session well, but was limited by fatigue  Pt continues to be making progress towards achieving his STG's  Pt's STG's were updated this session  Patient's prognosis for achieving their STG's is good  Co-treatment was performed with OT secondary to complex medical condition of pt  Pt requires assist of two to achieve and maintain transitional movements; requiring the need of skilled therapeutic intervention of two therapists to achieve the delivery of services  PT/OT goals were addressed separately   PT intervention continues to be appropriate as the patient continues to be limited bydecreased lower extremity strength, impaired balance, decreased endurance, and decreased functional mobility  Continue to recommend STR  PT to continue to see patient in order to address the deficits listed above and provide interventions consistent with the POC in order to achieve STG's and optimize the patient's independence with functional mobility  Barriers to Discharge: Inaccessible home environment, Decreased caregiver support, Other (Comment) (decline in functional status)     PT Discharge Recommendation: Post acute rehabilitation services    See flowsheet documentation for full assessment

## 2023-03-07 NOTE — PHYSICAL THERAPY NOTE
Physical Therapy Treatment Note    Patient Name: Eliana Juárez    Diagnosis: Suicidal ideation [R45 851]  Ketosis ([address] ) [E88 89]  Hyperglycemia [R73 9]  Failure to thrive in adult [R62 7]  Encounter for psychological evaluation [Z00 8]  Type 2 diabetes mellitus with diabetic peripheral angiopathy without gangrene, with long-term current use of insulin ([address] ) [E11 51, Z79 4]     03/07/23 0925   PT Last Visit   PT Visit Date 03/07/23   Note Type   Note Type Treatment for insurance authorization   Pain Assessment   Pain Assessment Tool 0-10   Pain Score No Pain   Restrictions/Precautions   Weight Bearing Precautions Per Order Yes   RLE Weight Bearing Per Order NWB   Braces or Orthoses Knee brace  (R LE)   Other Precautions Chair Alarm; Bed Alarm; Fall Risk;WBS  (virtual 1:1)   General   Chart Reviewed Yes   Response to Previous Treatment Patient with no complaints from previous session  Family/Caregiver Present No   Cognition   Overall Cognitive Status WFL   Arousal/Participation Alert; Responsive   Attention Within functional limits   Orientation Level Oriented X4   Memory Within functional limits   Following Commands Follows all commands and directions without difficulty   Comments Pt agreeable to PT  Subjective   Subjective "I don't want to stand "   Bed Mobility   Supine to Sit 4  Minimal assistance  (CG assist)   Additional items Assist x 1;HOB elevated; Bedrails; Increased time required;Verbal cues;LE management   Sit to Supine 4  Minimal assistance  (CG assist)   Additional items Assist x 1;HOB elevated; Bedrails; Increased time required;Verbal cues;LE management   Additional Comments pt performed supine to sit transfer x 2 trials   Transfers   Sit to Stand Unable to assess  (pt refused)   Sit pivot 4  Minimal assistance   Additional items Assist x 2;Armrests; Increased time required;Verbal cues   Balance   Static Sitting Fair +   Dynamic Sitting Fair   Static Standing Poor   Dynamic Standing Poor -   Endurance Deficit   Endurance Deficit Yes   Endurance Deficit Description decreased activity tolerance   Activity Tolerance   Activity Tolerance Patient limited by fatigue   Nurse Made Aware BONI Olivo   Exercises   Hip Flexion Sitting;5 reps;AROM; Bilateral   Hip Abduction Sitting;10 reps;AROM; Left  (long sitting)   Knee AROM Long Arc Quad Sitting;10 reps;AROM; Left   Assessment   Prognosis Good   Problem List Decreased strength; Impaired balance;Decreased mobility; Decreased endurance;Orthopedic restrictions   Assessment Chart reviewed  Patient was received supine in bed in NAD and agreeable to PT session  Today's PT treatment session consisted of therapeutic activity for facilitation of transitional movements and safe performance of correct technique for bed mobility and sit pivot transfers and therapeutic exercise to increase lower extremity muscle strength  In comparison to the previous session the patient has made progress as evident by requiring decreased assistance with bed mobility  Pt was also able to perform a sit pivot transfer with assist of two  Pt requires verbal cues to maintain NWB on R LE  Pt refused to perform sit to stand transfer at this time  Pt tolerated therapeutic exercise well, but required frequent seated rest breaks  Overall, patient tolerated today's session well, but was limited by fatigue  Pt continues to be making progress towards achieving his STG's  Pt's STG's were updated this session  Patient's prognosis for achieving their STG's is good  Co-treatment was performed with OT secondary to complex medical condition of pt  Pt requires assist of two to achieve and maintain transitional movements; requiring the need of skilled therapeutic intervention of two therapists to achieve the delivery of services  PT/OT goals were addressed separately   PT intervention continues to be appropriate as the patient continues to be limited bydecreased lower extremity strength, impaired balance, decreased endurance, and decreased functional mobility  Continue to recommend STR  PT to continue to see patient in order to address the deficits listed above and provide interventions consistent with the POC in order to achieve STG's and optimize the patient's independence with functional mobility  Barriers to Discharge Inaccessible home environment;Decreased caregiver support; Other (Comment)  (decline in functional status)   Goals   STG Expiration Date 03/17/23   Short Term Goal #1 In 10 days: Increase bilateral LE strength 1/2 grade to facilitate independent mobility, Perform all bed mobility tasks modified independent to decrease caregiver burden, Perform all transfers with min A of 1 to improve independence and Increase all balance 1/2 grade to decrease risk for falls PT to see and establish goals for gait when appropriate   PT Treatment Day 1   Plan   Treatment/Interventions Functional transfer training;LE strengthening/ROM; Therapeutic exercise; Endurance training;Patient/family training;Bed mobility;Continued evaluation;Spoke to nursing;OT   Progress Progressing toward goals   PT Frequency 3-5x/wk   Recommendation   PT Discharge Recommendation Post acute rehabilitation services   AM-PAC Basic Mobility Inpatient   Turning in Flat Bed Without Bedrails 3   Lying on Back to Sitting on Edge of Flat Bed Without Bedrails 3   Moving Bed to Chair 2   Standing Up From Chair Using Arms 1   Walk in Room 1   Climb 3-5 Stairs With Railing 1   Basic Mobility Inpatient Raw Score 11   Basic Mobility Standardized Score 30 25   Highest Level Of Mobility   JH-HLM Goal 4: Move to chair/commode   JH-HLM Achieved 4: Move to chair/commode   Education   Education Provided Mobility training;Home exercise program;Assistive device   Patient Reinforcement needed   End of Consult   Patient Position at End of Consult Bedside chair;Bed/Chair alarm activated; All needs within reach  (RN aware)     Talia Parra, PT, DPT    Time of PT treatment session: [id number]  24 minutes

## 2023-03-07 NOTE — OCCUPATIONAL THERAPY NOTE
Occupational Therapy Treatment Note        Patient Name: Jacek Mcintyre  ZKAVG'V Date: 3/7/2023       03/07/23 0901   OT Last Visit   OT Visit Date 03/07/23   Note Type   Note Type Treatment for insurance authorization   Pain Assessment   Pain Assessment Tool 0-10   Pain Score No Pain   Restrictions/Precautions   Weight Bearing Precautions Per Order Yes   RLE Weight Bearing Per Order NWB   Braces or Orthoses Knee brace  (R LE)   Other Precautions Chair Alarm; Bed Alarm; Fall Risk;WBS  (virtual 1:1)   ADL   Where Assessed Edge of bed   Eating Assistance 6  Modified independent   Grooming Assistance 5  Supervision/Setup   Grooming Deficit Setup; Increased time to complete   Grooming Comments completed teeth care   UB Bathing Assistance 4  Minimal Assistance   UB Bathing Deficit Setup;Supervision/safety; Increased time to complete   LB Bathing Assistance 3  Moderate Assistance   LB Bathing Deficit Setup;Supervision/safety; Increased time to complete; Other (Comment)  (assist with lower leg and foot)   UB Dressing Assistance 5  Supervision/Setup   UB Dressing Deficit Setup;Supervision/safety   LB Dressing Assistance 3  Moderate Assistance   LB Dressing Deficit Setup; Thread RLE into underwear; Thread LLE into underwear; Thread RLE into pants; Thread LLE into pants  (assist with lower leg and foot)   Toileting Assistance  3  Moderate Assistance   Toileting Deficit Setup; Increased time to complete; Bedside commode;Perineal hygiene   Bed Mobility   Supine to Sit 4  Minimal assistance   Additional items Assist x 1;HOB elevated; Bedrails; Increased time required;Verbal cues;LE management   Sit to Supine 4  Minimal assistance   Additional items Assist x 1;HOB elevated; Bedrails; Increased time required;Verbal cues;LE management   Additional Comments pt performed supine to sit transfer x 2 trials   Transfers   Sit pivot 4  Minimal assistance   Additional items Assist x 2;Armrests; Increased time required;Verbal cues   Cognition Overall Cognitive Status [facility]   Arousal/Participation Alert; Responsive   Attention Within functional limits   Orientation Level Oriented X4   Memory Within functional limits   Following Commands Follows all commands and directions without difficulty   Activity Tolerance   Activity Tolerance Patient tolerated treatment well   Medical Staff Made Aware Co treatment session with Physical Therapy secondary to complex medical condition, requiring two skilled therapists to provide therapeutic techniques to position, facilitate and elicit functional mobility and activity engagement in occupational performance areas  Assessment   Assessment Patient participated in Skilled OT session this date with interventions consisting of ADL re training with the use of correct body mechnaics, Energy Conservation techniques, Work simplification skills , safety awareness and fall prevention techniques,  therapeutic activities to: increase activity tolerance, increase cardiovascular endurance , increase dynamic sit/ stand balance during functional activity , increase postural control, increase trunk control and increase OOB/ sitting tolerance   Patient agreeable to OT treatment session, upon arrival patient was found supine in bed, alert, responsive  and in no apparent distress  In comparison to previous session, patient with improvements in overall endurance and ability to engage in therapeutic activities  Patient completed UB ADLs with min assist of 1, while sitting at edge of bed, requires mod assist for UB ADLs, max assist for lower leg and foot  Patient requiring verbal cues for safety, verbal cues for correct technique and frequent rest periods  Patient continues to be functioning below baseline level, occupational performance remains limited secondary to factors listed above and increased risk for falls and injury  From OT standpoint, recommendation at time of d/c would be Short Term Rehab     Patient to benefit from continued Occupational Therapy treatment while in the hospital to address deficits as defined above and maximize level of functional independence with ADLs and functional mobility  Plan   Treatment Interventions ADL retraining;Functional transfer training;UE strengthening/ROM; Endurance training;Patient/family training;Equipment evaluation/education; Fine motor coordination activities; Compensatory technique education;Continued evaluation; Energy conservation; Activityengagement   Goal Expiration Date 03/18/23   OT Treatment Day 2   OT Frequency 3-5x/wk   Recommendation   OT Discharge Recommendation Post acute rehabilitation services   AM-PAC Daily Activity Inpatient   Lower Body Dressing 1   Bathing 2   Toileting 2   Upper Body Dressing 3   Grooming 3   Eating 4   Daily Activity Raw Score 15   Daily Activity Standardized Score (Calc for Raw Score >=11) 34 69   AM-PAC Applied Cognition Inpatient   Following a Speech/Presentation 4   Understanding Ordinary Conversation 4   Taking Medications 4   Remembering Where Things Are Placed or Put Away 4   Remembering List of 4-5 Errands 4   Taking Care of Complicated Tasks 4   Applied Cognition Raw Score 24   Applied Cognition Standardized Score 62 21

## 2023-03-07 NOTE — PLAN OF CARE
Problem: OCCUPATIONAL THERAPY ADULT  Goal: Performs self-care activities at highest level of function for planned discharge setting  See evaluation for individualized goals  Description: Treatment Interventions: ADL retraining, Functional transfer training, UE strengthening/ROM, Endurance training, Patient/family training, Equipment evaluation/education, Fine motor coordination activities, Compensatory technique education, Continued evaluation, Energy conservation, Activityengagement          See flowsheet documentation for full assessment, interventions and recommendations  Note: Limitation: Decreased ADL status, Decreased UE ROM, Decreased UE strength, Decreased endurance, Decreased sensation, Decreased fine motor control, Decreased self-care trans, Decreased high-level ADLs, Mood limitation  Prognosis: Good  Assessment: Patient participated in Skilled OT session this date with interventions consisting of ADL re training with the use of correct body mechnaics, Energy Conservation techniques, Work simplification skills , safety awareness and fall prevention techniques,  therapeutic activities to: increase activity tolerance, increase cardiovascular endurance , increase dynamic sit/ stand balance during functional activity , increase postural control, increase trunk control and increase OOB/ sitting tolerance   Patient agreeable to OT treatment session, upon arrival patient was found supine in bed, alert, responsive  and in no apparent distress  In comparison to previous session, patient with improvements in overall endurance and ability to engage in therapeutic activities  Patient completed UB ADLs with min assist of 1, while sitting at edge of bed, requires mod assist for UB ADLs, max assist for lower leg and foot  Patient requiring verbal cues for safety, verbal cues for correct technique and frequent rest periods   Patient continues to be functioning below baseline level, occupational performance remains limited secondary to factors listed above and increased risk for falls and injury  From OT standpoint, recommendation at time of d/c would be Short Term Rehab  Patient to benefit from continued Occupational Therapy treatment while in the hospital to address deficits as defined above and maximize level of functional independence with ADLs and functional mobility       OT Discharge Recommendation: Post acute rehabilitation services

## 2023-03-07 NOTE — ASSESSMENT & PLAN NOTE
· Likely pre renal in setting of hypovolemia while on home diuretics  · Now resolved with IV hydration  · Continue to hold home diuretics for now  · Renal US without evidence of obstruction  · Monitor creatinine

## 2023-03-07 NOTE — QUICK NOTE
Asked to order virtual 1:1 observation for low SI risk overnight  Per nursing was D/C'd by [facility] provider earlier today, however can only be performed by psych

## 2023-03-08 LAB
GLUCOSE SERPL-MCNC: 101 MG/DL (ref 65–140)
GLUCOSE SERPL-MCNC: 160 MG/DL (ref 65–140)
GLUCOSE SERPL-MCNC: 170 MG/DL (ref 65–140)
GLUCOSE SERPL-MCNC: 182 MG/DL (ref 65–140)

## 2023-03-08 RX ORDER — SENNOSIDES 8.6 MG
1 TABLET ORAL
Status: DISCONTINUED | OUTPATIENT
Start: 2023-03-08 | End: 2023-03-13 | Stop reason: HOSPADM

## 2023-03-08 RX ORDER — DOCUSATE SODIUM 100 MG/1
100 CAPSULE, LIQUID FILLED ORAL 2 TIMES DAILY
Status: DISCONTINUED | OUTPATIENT
Start: 2023-03-08 | End: 2023-03-13 | Stop reason: HOSPADM

## 2023-03-08 RX ADMIN — INSULIN LISPRO 1 UNITS: 100 INJECTION, SOLUTION INTRAVENOUS; SUBCUTANEOUS at 21:56

## 2023-03-08 RX ADMIN — INSULIN LISPRO 1 UNITS: 100 INJECTION, SOLUTION INTRAVENOUS; SUBCUTANEOUS at 17:11

## 2023-03-08 RX ADMIN — ATORVASTATIN CALCIUM 40 MG: 40 TABLET, FILM COATED ORAL at 17:11

## 2023-03-08 RX ADMIN — DOCUSATE SODIUM 100 MG: 100 CAPSULE, LIQUID FILLED ORAL at 13:14

## 2023-03-08 RX ADMIN — INSULIN GLARGINE 25 UNITS: 100 INJECTION, SOLUTION SUBCUTANEOUS at 08:35

## 2023-03-08 RX ADMIN — SENNOSIDES 8.6 MG: 8.6 TABLET, FILM COATED ORAL at 21:55

## 2023-03-08 RX ADMIN — TAMSULOSIN HYDROCHLORIDE 0.4 MG: 0.4 CAPSULE ORAL at 17:11

## 2023-03-08 RX ADMIN — PANTOPRAZOLE SODIUM 40 MG: 40 TABLET, DELAYED RELEASE ORAL at 06:07

## 2023-03-08 RX ADMIN — ESCITALOPRAM OXALATE 10 MG: 10 TABLET ORAL at 08:35

## 2023-03-08 RX ADMIN — CLOPIDOGREL BISULFATE 75 MG: 75 TABLET ORAL at 08:35

## 2023-03-08 RX ADMIN — LEVOTHYROXINE SODIUM 150 MCG: 150 TABLET ORAL at 08:35

## 2023-03-08 RX ADMIN — ASPIRIN 81 MG 81 MG: 81 TABLET ORAL at 08:35

## 2023-03-08 RX ADMIN — VERAPAMIL HYDROCHLORIDE 360 MG: 180 TABLET ORAL at 08:35

## 2023-03-08 RX ADMIN — DOCUSATE SODIUM 100 MG: 100 CAPSULE, LIQUID FILLED ORAL at 17:11

## 2023-03-08 RX ADMIN — INSULIN GLARGINE 25 UNITS: 100 INJECTION, SOLUTION SUBCUTANEOUS at 21:55

## 2023-03-08 NOTE — CASE MANAGEMENT
Case Management Discharge Planning Note    Patient name Eliana Juárez  Location [id number] MRN [id number]  : 1965 Date 3/8/2023       Current Admission Date: 3/3/2023  Current Admission Diagnosis:MARKELL (acute kidney injury) [facility])   Patient Active Problem List    Diagnosis Date Noted   • Failure to thrive in adult 2023   • Depression and anxiety 2023   • Acquired genu varum of right lower extremity 2023   • MARKELL (acute kidney injury) ([facility] ) 2023   • Chronic kidney disease-mineral and bone disorder 2023   • Closed fracture of right tibial plateau    • Stage 3b chronic kidney disease ([facility] ) 11/15/2022   • Hyperkalemia 11/15/2022   • Alcoholism ([facility] ) 11/15/2022   • Dupuytren's contracture of right hand 2022   • Type 2 diabetes mellitus with diabetic peripheral angiopathy without gangrene ([facility] ) 2022   • Physical deconditioning 2021   • Acquired hypothyroidism 10/18/2021   • Acquired absence of right foot ([facility] ) 2021   • Persistent proteinuria 2021   • Abnormal EKG 2020   • Insomnia 2020   • Anemia 10/08/2020   • Hypomagnesemia 2020   • Urinary retention 2020   • Thyroid cancer ([facility] ) 2020   • PAD (peripheral artery disease) ([facility] ) 2020   • Health maintenance examination 2020   • Elevated liver enzymes 10/24/2019   • Colon polyps 2019   • Right-sided tinnitus 2018   • HHS vs DKA 2018   • Hypertension, essential 2018   • Gastroesophageal reflux disease without esophagitis 2018   • Obesity, Class I, BMI 30 0-34 9 (see actual BMI) 2010   • Hyperlipidemia 2009   • History of nonadherence to medical treatment 2008      LOS (days): 5  Geometric Mean LOS (GMLOS) (days): 3 40  Days to GMLOS:-1 5     OBJECTIVE:  Risk of Unplanned Readmission Score: 18 15         Current admission status: Inpatient   Preferred Pharmacy:   [address] [place] [address] #[id number] [facility] -   [address]  [place] [address]  Phone: [phone] Fax: [phone] [address], [address] [place] [address]  Phone: [phone] Fax: [phone]    Primary Care Provider: David Jiménez MD    Primary Insurance: BLUE CROSS  Secondary Insurance:     DISCHARGE DETAILS:     CM gave patient choice list with 2 facilities  Patient was agreeable to [facility]  CM was asked by [facility] to complete PASSR  CM did PASSAR and was having some difficulties so cm contacted facility and CM received a call back from [facility] stated that she would have a  at the facility complete the PASSR  CM contacted discharge support to submit for auth

## 2023-03-08 NOTE — PROGRESS NOTES
[facility]  Progress Note - Yesenia Recinos 1965, 59 y o  male MRN: [id number]  Unit/Bed#: MS Janna-01 Encounter: [id number]  Primary Care Provider: George Solares MD   Date and time admitted to hospital: 3/3/2023 11:45 AM    * MARKELL (acute kidney injury) ([facility] [address] )  Assessment & Plan  · Likely pre renal in setting of hypovolemia while on home diuretics  · Now resolved with IV hydration  · Continue to hold home diuretics for now  · Renal US without evidence of obstruction  · Monitor creatinine     Physical deconditioning  Assessment & Plan  · Hx of fall and knee injury last month, unable to take care of self at home  · right tibial plateau comminuted fracture being treated conservatively by ortho outpatient   · PT/OT evaluation recommending rehab   · CM for placement          VTE Pharmacologic Prophylaxis: VTE Score: 2 Low Risk (Score 0-2) - Encourage Ambulation  Patient Centered Rounds: I performed bedside rounds with nursing staff today  Discussions with Specialists or Other Care Team Provider: ana    Education and Discussions with Family / Patient: Patient declined call to   Total Time Spent on Date of Encounter in care of patient: 35 minutes This time was spent on one or more of the following: performing physical exam; counseling and coordination of care; obtaining or reviewing history; documenting in the medical record; reviewing/ordering tests, medications or procedures; communicating with other healthcare professionals and discussing with patient's family/caregivers  Current Length of Stay: 5 day(s)  Current Patient Status: Inpatient   Certification Statement: The patient will continue to require additional inpatient hospital stay due to placement  Discharge Plan: Anticipate discharge tomorrow to rehab facility      Code Status: Level 1 - Full Code    Subjective:   Patient feels well     Objective:     Vitals:   Temp (24hrs), Av °F (36 7 °C), Min:97 9 °F (36 6 °C), Max:98 1 °F (36 7 °C)    Temp:  [97 9 °F (36 6 °C)-98 1 °F (36 7 °C)] 98 1 °F (36 7 °C)  HR:  [79-82] 82  Resp:  [17] 17  BP: (111-126)/(64-80) 126/73  SpO2:  [90 %-95 %] 95 %  Body mass index is 33 62 kg/m²  Input and Output Summary (last 24 hours): Intake/Output Summary (Last 24 hours) at 3/8/2023 1348  Last data filed at 3/8/2023 0776  Gross per 24 hour   Intake 1260 ml   Output 2050 ml   Net -790 ml       Physical Exam:   Physical Exam  Constitutional:       General: He is not in acute distress  Appearance: Normal appearance  He is not toxic-appearing  Cardiovascular:      Rate and Rhythm: Normal rate and regular rhythm  Heart sounds: Normal heart sounds  No murmur heard  Pulmonary:      Effort: Pulmonary effort is normal  No respiratory distress  Breath sounds: Normal breath sounds  No wheezing  Abdominal:      General: Abdomen is flat  There is no distension  Palpations: Abdomen is soft  Tenderness: There is no abdominal tenderness  Neurological:      General: No focal deficit present  Mental Status: He is alert and oriented to person, place, and time  Mental status is at baseline  Motor: No weakness  Additional Data:     Labs:  Results from last 7 days   Lab Units 03/07/23  0542 03/05/23  0442 03/04/23  0555   WBC Thousand/uL 4 80   < > 7 24   HEMOGLOBIN g/dL 10 3*   < > 10 7*   HEMATOCRIT % 30 2*   < > 30 6*   PLATELETS Thousands/uL 207   < > 188   NEUTROS PCT %  --   --  57   LYMPHS PCT %  --   --  26   MONOS PCT %  --   --  13*   EOS PCT %  --   --  2    < > = values in this interval not displayed       Results from last 7 days   Lab Units 03/07/23  0542 03/03/23  1524 03/03/23  1243   SODIUM mmol/L 135   < > 129*   POTASSIUM mmol/L 3 7   < > 3 9   CHLORIDE mmol/L 101   < > 90*   CO2 mmol/L 26   < > 22   BUN mg/dL 16   < > 55*   CREATININE mg/dL 0 92   < > 2 11*   ANION GAP mmol/L 8   < > 17*   CALCIUM mg/dL 8 9   < > 9 1   ALBUMIN g/dL  --   -- 3  7   TOTAL BILIRUBIN mg/dL  --   --  0 85   ALK PHOS U/L  --   --  119*   ALT U/L  --   --  33   AST U/L  --   --  36   GLUCOSE RANDOM mg/dL 114   < > 300*    < > = values in this interval not displayed  Results from last 7 days   Lab Units 03/08/23  1053 03/08/23  0740 03/07/23  2025 03/07/23  1550 03/07/23  1059 03/07/23  0749 03/06/23  2047 03/06/23  1622 03/06/23  1105 03/06/23  0721 03/05/23 2023 03/05/23  1606   POC GLUCOSE mg/dl 170* 101 164* 84 132 117 201* 147* 205* 125 180* 105               Lines/Drains:  Invasive Devices     Peripheral Intravenous Line  Duration           Peripheral IV 03/07/23 Dorsal (posterior); Right Hand 1 day                      Imaging: No pertinent imaging reviewed      Recent Cultures (last 7 days):   Results from last 7 days   Lab Units 03/04/23  1320   C DIFF TOXIN B BY PCR  Negative       Last 24 Hours Medication List:   Current Facility-Administered Medications   Medication Dose Route Frequency Provider Last Rate   • acetaminophen  975 mg Oral Q8H PRN Alysha Palencia MD     • aspirin  81 mg Oral Daily Callie Sevilla MD     • atorvastatin  40 mg Oral Daily With [address] Olinda Bernheim, MD     • clopidogrel  75 mg Oral Daily Callie Sevilla MD     • docusate sodium  100 mg Oral BID Mandy Blair MD     • escitalopram  10 mg Oral Daily Callie Sevilla MD     • hydrOXYzine HCL  25 mg Oral Q6H PRN Callie Sevilla MD     • insulin glargine  25 Units Subcutaneous Q12H [address] Alysha Palencia MD     • insulin lispro  1-6 Units Subcutaneous 4x Daily (AC & HS) Alysha Palencia MD     • insulin lispro  10 Units Subcutaneous TID With Meals Alysha Palencia MD     • levothyroxine  150 mcg Oral Daily Callie Sevilla MD     • mirtazapine  7 5 mg Oral HS Callie Sevilla MD     • ondansetron  4 mg Intravenous Q6H PRN Callie Sevilla MD     • pantoprazole  40 mg Oral Early Morning Iman Golden MD     • senna  1 tablet Oral HS Antonetta Lennox, MD     • tamsulosin  0 4 mg Oral Daily With Apolinar Flores MD     • traMADol  50 mg Oral Q6H PRN Arthur King MD     • verapamil  360 mg Oral Daily Iman Golden MD          Today, Patient Was Seen By: Luz Escoto MD    **Please Note: This note may have been constructed using a voice recognition system  **

## 2023-03-08 NOTE — PLAN OF CARE
Problem: Knowledge Deficit  Goal: Patient/family/caregiver demonstrates understanding of disease process, treatment plan, medications, and discharge instructions  Description: Complete learning assessment and assess knowledge base  Interventions:  - Provide teaching at level of understanding  - Provide teaching via preferred learning methods  Outcome: Progressing     Problem: Nutrition/Hydration-ADULT  Goal: Nutrient/Hydration intake appropriate for improving, restoring or maintaining nutritional needs  Description: Monitor and assess patient's nutrition/hydration status for malnutrition  Collaborate with interdisciplinary team and initiate plan and interventions as ordered  Monitor patient's weight and dietary intake as ordered or per policy  Utilize nutrition screening tool and intervene as necessary  Determine patient's food preferences and provide high-protein, high-caloric foods as appropriate       INTERVENTIONS:  - Monitor oral intake, urinary output, labs, and treatment plans  - Assess nutrition and hydration status and recommend course of action  - Evaluate amount of meals eaten  - Assist patient with eating if necessary   - Allow adequate time for meals  - Recommend/ encourage appropriate diets, oral nutritional supplements, and vitamin/mineral supplements  - Order, calculate, and assess calorie counts as needed  - Recommend, monitor, and adjust tube feedings and TPN/PPN based on assessed needs  - Assess need for intravenous fluids  - Provide specific nutrition/hydration education as appropriate  - Include patient/family/caregiver in decisions related to nutrition  Outcome: Progressing

## 2023-03-08 NOTE — CASE MANAGEMENT
[facility] [address] received request for authorization from Care Manager  Authorization request for: SNF  Facility Name: [facility]   NPI: [id number]   Facility MD: Dr Hyman Aw: [id number]   Authorization initiated by contacting insurance: [employer]  Via: Phone [phone]  Clinicals submitted via:  Fax [phone]

## 2023-03-09 PROBLEM — K59.00 CONSTIPATION: Status: ACTIVE | Noted: 2023-03-09

## 2023-03-09 LAB
GLUCOSE SERPL-MCNC: 104 MG/DL (ref 65–140)
GLUCOSE SERPL-MCNC: 122 MG/DL (ref 65–140)
GLUCOSE SERPL-MCNC: 134 MG/DL (ref 65–140)
GLUCOSE SERPL-MCNC: 92 MG/DL (ref 65–140)

## 2023-03-09 RX ORDER — LACTULOSE 20 G/30ML
20 SOLUTION ORAL ONCE
Status: COMPLETED | OUTPATIENT
Start: 2023-03-09 | End: 2023-03-09

## 2023-03-09 RX ADMIN — SENNOSIDES 8.6 MG: 8.6 TABLET, FILM COATED ORAL at 21:36

## 2023-03-09 RX ADMIN — DOCUSATE SODIUM 100 MG: 100 CAPSULE, LIQUID FILLED ORAL at 19:11

## 2023-03-09 RX ADMIN — DOCUSATE SODIUM 100 MG: 100 CAPSULE, LIQUID FILLED ORAL at 09:31

## 2023-03-09 RX ADMIN — ASPIRIN 81 MG 81 MG: 81 TABLET ORAL at 09:31

## 2023-03-09 RX ADMIN — VERAPAMIL HYDROCHLORIDE 360 MG: 180 TABLET ORAL at 09:31

## 2023-03-09 RX ADMIN — MAGNESIUM HYDROXIDE 30 ML: 400 SUSPENSION ORAL at 09:40

## 2023-03-09 RX ADMIN — CLOPIDOGREL BISULFATE 75 MG: 75 TABLET ORAL at 09:31

## 2023-03-09 RX ADMIN — LEVOTHYROXINE SODIUM 150 MCG: 150 TABLET ORAL at 09:31

## 2023-03-09 RX ADMIN — LACTULOSE 20 G: 20 SOLUTION ORAL at 16:45

## 2023-03-09 RX ADMIN — ATORVASTATIN CALCIUM 40 MG: 40 TABLET, FILM COATED ORAL at 16:29

## 2023-03-09 RX ADMIN — INSULIN GLARGINE 25 UNITS: 100 INJECTION, SOLUTION SUBCUTANEOUS at 09:29

## 2023-03-09 RX ADMIN — ESCITALOPRAM OXALATE 10 MG: 10 TABLET ORAL at 09:31

## 2023-03-09 RX ADMIN — INSULIN LISPRO 10 UNITS: 100 INJECTION, SOLUTION INTRAVENOUS; SUBCUTANEOUS at 11:29

## 2023-03-09 RX ADMIN — PANTOPRAZOLE SODIUM 40 MG: 40 TABLET, DELAYED RELEASE ORAL at 06:32

## 2023-03-09 RX ADMIN — TAMSULOSIN HYDROCHLORIDE 0.4 MG: 0.4 CAPSULE ORAL at 16:29

## 2023-03-09 NOTE — PROGRESS NOTES
[facility]  Progress Note - Franck Landaverde 1965, 59 y o  male MRN: [id number]  Unit/Bed#: MS Janna-01 Encounter: [id number]  Primary Care Provider: Tawanna Romero MD   Date and time admitted to hospital: 3/3/2023 11:45 AM    * MARKELL (acute kidney injury) [facility])  Assessment & Plan  · Likely pre renal in setting of hypovolemia while on home diuretics  · Now resolved with IV hydration  · Continue to hold home diuretics for now  · Renal US without evidence of obstruction  · Monitor creatinine     Constipation  Assessment & Plan  · No bowel movement in last 48 hours  · Rehab will not take patient until bowel movement  · Add milk of magnesia     Physical deconditioning  Assessment & Plan  · Hx of fall and knee injury last month, unable to take care of self at home  · right tibial plateau comminuted fracture being treated conservatively by ortho outpatient   · PT/OT evaluation recommending rehab   · CM for placement          VTE Pharmacologic Prophylaxis: VTE Score: 2 Low Risk (Score 0-2) - Encourage Ambulation  Patient Centered Rounds: I performed bedside rounds with nursing staff today  Discussions with Specialists or Other Care Team Provider: ana    Education and Discussions with Family / Patient: Patient declined call to   Total Time Spent on Date of Encounter in care of patient: 35 minutes This time was spent on one or more of the following: performing physical exam; counseling and coordination of care; obtaining or reviewing history; documenting in the medical record; reviewing/ordering tests, medications or procedures; communicating with other healthcare professionals and discussing with patient's family/caregivers      Current Length of Stay: 6 day(s)  Current Patient Status: Inpatient   Certification Statement: The patient will continue to require additional inpatient hospital stay due to bowel movement  Discharge Plan: Anticipate discharge tomorrow to rehab facility  Code Status: Level 1 - Full Code    Subjective:   Patient feels well but is constipated     Objective:     Vitals:   Temp (24hrs), Av 5 °F (36 9 °C), Min:98 3 °F (36 8 °C), Max:98 7 °F (37 1 °C)    Temp:  [98 3 °F (36 8 °C)-98 7 °F (37 1 °C)] 98 7 °F (37 1 °C)  HR:  [82] 82  Resp:  [18] 18  BP: (121-124)/(77-99) 121/81  SpO2:  [96 %] 96 %  Body mass index is 33 62 kg/m²  Input and Output Summary (last 24 hours): Intake/Output Summary (Last 24 hours) at 3/9/2023 1025  Last data filed at 3/9/2023 0601  Gross per 24 hour   Intake 480 ml   Output 1650 ml   Net -1170 ml       Physical Exam:   Physical Exam  Constitutional:       General: He is not in acute distress  Appearance: Normal appearance  He is not toxic-appearing  Cardiovascular:      Rate and Rhythm: Normal rate and regular rhythm  Heart sounds: Normal heart sounds  No murmur heard  Pulmonary:      Effort: Pulmonary effort is normal  No respiratory distress  Breath sounds: Normal breath sounds  No wheezing  Abdominal:      General: Abdomen is flat  There is no distension  Palpations: Abdomen is soft  Tenderness: There is no abdominal tenderness  Neurological:      General: No focal deficit present  Mental Status: He is alert and oriented to person, place, and time  Mental status is at baseline  Motor: No weakness  Additional Data:     Labs:  Results from last 7 days   Lab Units 23  0542 23  0442 23  0555   WBC Thousand/uL 4 80   < > 7 24   HEMOGLOBIN g/dL 10 3*   < > 10 7*   HEMATOCRIT % 30 2*   < > 30 6*   PLATELETS Thousands/uL 207   < > 188   NEUTROS PCT %  --   --  57   LYMPHS PCT %  --   --  26   MONOS PCT %  --   --  13*   EOS PCT %  --   --  2    < > = values in this interval not displayed       Results from last 7 days   Lab Units 23  0542 23  1524 23  1243   SODIUM mmol/L 135   < > 129*   POTASSIUM mmol/L 3 7   < > 3 9   CHLORIDE mmol/L 101   < > 90*   CO2 mmol/L 26   < > 22   BUN mg/dL 16   < > 55*   CREATININE mg/dL 0 92   < > 2 11*   ANION GAP mmol/L 8   < > 17*   CALCIUM mg/dL 8 9   < > 9 1   ALBUMIN g/dL  --   --  3 7   TOTAL BILIRUBIN mg/dL  --   --  0 85   ALK PHOS U/L  --   --  119*   ALT U/L  --   --  33   AST U/L  --   --  36   GLUCOSE RANDOM mg/dL 114   < > 300*    < > = values in this interval not displayed  Results from last 7 days   Lab Units 03/09/23  0748 03/08/23  2020 03/08/23  1616 03/08/23  1053 03/08/23  0740 03/07/23  2025 03/07/23  1550 03/07/23  1059 03/07/23  0749 03/06/23  2047 03/06/23  1622 03/06/23  1105   POC GLUCOSE mg/dl 122 182* 160* 170* 101 164* 84 132 117 201* 147* 205*               Lines/Drains:  Invasive Devices     Peripheral Intravenous Line  Duration           Peripheral IV 03/07/23 Dorsal (posterior); Right Hand 2 days                      Imaging: No pertinent imaging reviewed      Recent Cultures (last 7 days):   Results from last 7 days   Lab Units 03/04/23  1320   C DIFF TOXIN B BY PCR  Negative       Last 24 Hours Medication List:   Current Facility-Administered Medications   Medication Dose Route Frequency Provider Last Rate   • acetaminophen  975 mg Oral Q8H PRN Deon Coker MD     • aspirin  81 mg Oral Daily Renetta Zamudio MD     • atorvastatin  40 mg Oral Daily With [address] Sonia Treadwell MD     • clopidogrel  75 mg Oral Daily Renetta Zamudio MD     • docusate sodium  100 mg Oral BID Armando De La Torre MD     • escitalopram  10 mg Oral Daily Renetta Zamuido MD     • hydrOXYzine HCL  25 mg Oral Q6H PRN Renetta Zamudio MD     • insulin glargine  25 Units Subcutaneous Q12H [address] Deon Coker MD     • insulin lispro  1-6 Units Subcutaneous 4x Daily (AC & HS) Deon Coker MD     • insulin lispro  10 Units Subcutaneous TID With Meals Deon Coker MD     • levothyroxine  150 mcg Oral Daily Renetta Zamudio MD     • mirtazapine  7 5 mg Oral HS Chase Ortega MD     • ondansetron  4 mg Intravenous Q6H PRN Chase Ortega MD     • pantoprazole  40 mg Oral Early Morning Shelia Ellison MD     • senna  1 tablet Oral HS Denny Avila MD     • tamsulosin  0 4 mg Oral Daily With Esther Randolph MD     • traMADol  50 mg Oral Q6H PRN Sharyle Pinks, MD     • verapamil  360 mg Oral Daily Chase Ortega MD          Today, Patient Was Seen By: Goyo Lacy MD    **Please Note: This note may have been constructed using a voice recognition system  **

## 2023-03-09 NOTE — PLAN OF CARE
Problem: PAIN - ADULT  Goal: Verbalizes/displays adequate comfort level or baseline comfort level  Description: Interventions:  - Encourage patient to monitor pain and request assistance  - Assess pain using appropriate pain scale  - Administer analgesics based on type and severity of pain and evaluate response  - Implement non-pharmacological measures as appropriate and evaluate response  - Consider cultural and social influences on pain and pain management  - Notify physician/advanced practitioner if interventions unsuccessful or patient reports new pain  Outcome: Progressing     Problem: INFECTION - ADULT  Goal: Absence or prevention of progression during hospitalization  Description: INTERVENTIONS:  - Assess and monitor for signs and symptoms of infection  - Monitor lab/diagnostic results  - Monitor all insertion sites, i e  indwelling lines, tubes, and drains  - Monitor endotracheal if appropriate and nasal secretions for changes in amount and color  - [place] appropriate cooling/warming therapies per order  - Administer medications as ordered  - Instruct and encourage patient and family to use good hand hygiene technique  - Identify and instruct in appropriate isolation precautions for identified infection/condition  Outcome: Progressing  Goal: Absence of fever/infection during neutropenic period  Description: INTERVENTIONS:  - Monitor WBC    Outcome: Progressing

## 2023-03-09 NOTE — PLAN OF CARE
Problem: Nutrition/Hydration-ADULT  Goal: Nutrient/Hydration intake appropriate for improving, restoring or maintaining nutritional needs  Description: Monitor and assess patient's nutrition/hydration status for malnutrition  Collaborate with interdisciplinary team and initiate plan and interventions as ordered  Monitor patient's weight and dietary intake as ordered or per policy  Utilize nutrition screening tool and intervene as necessary  Determine patient's food preferences and provide high-protein, high-caloric foods as appropriate  INTERVENTIONS:  - Monitor oral intake, urinary output, labs, and treatment plans  - Assess nutrition and hydration status and recommend course of action  - Evaluate amount of meals eaten  - Assist patient with eating if necessary   - Allow adequate time for meals  - Recommend/ encourage appropriate diets, oral nutritional supplements, and vitamin/mineral supplements  - Order, calculate, and assess calorie counts as needed  - Recommend, monitor, and adjust tube feedings and TPN/PPN based on assessed needs  - Assess need for intravenous fluids  - Provide specific nutrition/hydration education as appropriate  - Include patient/family/caregiver in decisions related to nutrition  Outcome: Progressing     Problem: Knowledge Deficit  Goal: Patient/family/caregiver demonstrates understanding of disease process, treatment plan, medications, and discharge instructions  Description: Complete learning assessment and assess knowledge base    Interventions:  - Provide teaching at level of understanding  - Provide teaching via preferred learning methods  Outcome: Progressing     Problem: DISCHARGE PLANNING  Goal: Discharge to home or other facility with appropriate resources  Description: INTERVENTIONS:  - Identify barriers to discharge w/patient and caregiver  - Arrange for needed discharge resources and transportation as appropriate  - Identify discharge learning needs (meds, wound care, etc )  - Arrange for interpretive services to assist at discharge as needed  - Refer to Case Management Department for coordinating discharge planning if the patient needs post-hospital services based on physician/advanced practitioner order or complex needs related to functional status, cognitive ability, or social support system  Outcome: Progressing

## 2023-03-09 NOTE — ASSESSMENT & PLAN NOTE
· No bowel movement in last 48 hours  · Rehab will not take patient until bowel movement  · Add milk of magnesia

## 2023-03-10 PROBLEM — E11.42 TYPE 2 DIABETES MELLITUS WITH DIABETIC POLYNEUROPATHY, WITH LONG-TERM CURRENT USE OF INSULIN (HCC): Status: RESOLVED | Noted: 2018-11-16 | Resolved: 2023-03-10

## 2023-03-10 PROBLEM — Z79.4 TYPE 2 DIABETES MELLITUS WITH DIABETIC POLYNEUROPATHY, WITH LONG-TERM CURRENT USE OF INSULIN (HCC): Status: RESOLVED | Noted: 2018-11-16 | Resolved: 2023-03-10

## 2023-03-10 LAB
GLUCOSE SERPL-MCNC: 105 MG/DL (ref 65–140)
GLUCOSE SERPL-MCNC: 109 MG/DL (ref 65–140)
GLUCOSE SERPL-MCNC: 171 MG/DL (ref 65–140)
GLUCOSE SERPL-MCNC: 89 MG/DL (ref 65–140)

## 2023-03-10 RX ORDER — ZOLPIDEM TARTRATE 5 MG/1
10 TABLET ORAL
Status: DISCONTINUED | OUTPATIENT
Start: 2023-03-10 | End: 2023-03-13 | Stop reason: HOSPADM

## 2023-03-10 RX ADMIN — LEVOTHYROXINE SODIUM 150 MCG: 150 TABLET ORAL at 09:29

## 2023-03-10 RX ADMIN — INSULIN LISPRO 10 UNITS: 100 INJECTION, SOLUTION INTRAVENOUS; SUBCUTANEOUS at 12:28

## 2023-03-10 RX ADMIN — ACETAMINOPHEN 975 MG: 325 TABLET, FILM COATED ORAL at 16:32

## 2023-03-10 RX ADMIN — SENNOSIDES 8.6 MG: 8.6 TABLET, FILM COATED ORAL at 22:54

## 2023-03-10 RX ADMIN — DOCUSATE SODIUM 100 MG: 100 CAPSULE, LIQUID FILLED ORAL at 18:19

## 2023-03-10 RX ADMIN — ASPIRIN 81 MG 81 MG: 81 TABLET ORAL at 09:29

## 2023-03-10 RX ADMIN — TAMSULOSIN HYDROCHLORIDE 0.4 MG: 0.4 CAPSULE ORAL at 16:33

## 2023-03-10 RX ADMIN — INSULIN LISPRO 1 UNITS: 100 INJECTION, SOLUTION INTRAVENOUS; SUBCUTANEOUS at 12:28

## 2023-03-10 RX ADMIN — VERAPAMIL HYDROCHLORIDE 360 MG: 180 TABLET ORAL at 09:29

## 2023-03-10 RX ADMIN — PANTOPRAZOLE SODIUM 40 MG: 40 TABLET, DELAYED RELEASE ORAL at 06:29

## 2023-03-10 RX ADMIN — ZOLPIDEM TARTRATE 10 MG: 5 TABLET, COATED ORAL at 22:54

## 2023-03-10 RX ADMIN — INSULIN LISPRO 10 UNITS: 100 INJECTION, SOLUTION INTRAVENOUS; SUBCUTANEOUS at 16:35

## 2023-03-10 RX ADMIN — ATORVASTATIN CALCIUM 40 MG: 40 TABLET, FILM COATED ORAL at 16:33

## 2023-03-10 RX ADMIN — CLOPIDOGREL BISULFATE 75 MG: 75 TABLET ORAL at 09:29

## 2023-03-10 NOTE — NURSING NOTE
Pt refused evening standing HumaLOG 10 units due to poor PO intake  Pt ate less than 25% of lunch and dinner tray  Routine FS   PT A&Ox4 with no s/s hypo/hyper-glycemia  No altered LOC  Oncoming staff nurse made aware to continue monitoring pt BS & encourage PO intake

## 2023-03-10 NOTE — PROGRESS NOTES
[facility]  Progress Note - Sheyla Mayfield 1965, 59 y o  male MRN: [id number]  Unit/Bed#: -01 Encounter: [id number]  Primary Care Provider: Lisa Hayes MD   Date and time admitted to hospital: 3/3/2023 11:45 AM    * MARKELL (acute kidney injury) [facility])  Assessment & Plan  · Likely pre renal in setting of hypovolemia while on home diuretics  · Now resolved with IV hydration  · Continue to hold home diuretics for now  · Renal US without evidence of obstruction  · Monitor creatinine     HHS vs DKA-resolved as of 3/10/2023  Assessment & Plan  Lab Results   Component Value Date    HGBA1C 11 1 (H) 11/14/2022       Recent Labs     03/09/23  1608 03/09/23  2113 03/10/23  0720 03/10/23  1103   POCGLU 104 92 89 171*       Blood Sugar Average: Last 72 hrs:  (P) 130 [id number]     · Gap closed  · Transitioned to subcutaneous basal/bolus insulin  · Insulin sliding scale with Accu-Cheks  · Diabetic diet  · Resolved     Constipation  Assessment & Plan  · No bowel movement in last 48 hours  · Rehab will not take patient until bowel movement  · Added milk of magnesia   · Now resolved     Physical deconditioning  Assessment & Plan  · Hx of fall and knee injury last month, unable to take care of self at home  · right tibial plateau comminuted fracture being treated conservatively by ortho outpatient   · PT/OT evaluation recommending rehab   · CM for placement    Hypertension, essential  Assessment & Plan  · Currently adequately controlled off meds  · Continue to hold          VTE Pharmacologic Prophylaxis: VTE Score: 2 Low Risk (Score 0-2) - Encourage Ambulation  Patient Centered Rounds: I performed bedside rounds with nursing staff today  Discussions with Specialists or Other Care Team Provider: ana    Education and Discussions with Family / Patient: Patient declined call to        Total Time Spent on Date of Encounter in care of patient: 35 minutes This time was spent on one or more of the following: performing physical exam; counseling and coordination of care; obtaining or reviewing history; documenting in the medical record; reviewing/ordering tests, medications or procedures; communicating with other healthcare professionals and discussing with patient's family/caregivers  Current Length of Stay: 7 day(s)  Current Patient Status: Inpatient   Certification Statement: The patient will continue to require additional inpatient hospital stay due to insurance authorization for placement  Discharge Plan: Anticipate discharge tomorrow to rehab facility  Code Status: Level 1 - Full Code    Subjective:   Patient had a bowel movement overnight  Feels well today     Objective:     Vitals:   Temp (24hrs), Av 9 °F (36 6 °C), Min:97 6 °F (36 4 °C), Max:98 5 °F (36 9 °C)    Temp:  [97 6 °F (36 4 °C)-98 5 °F (36 9 °C)] 98 °F (36 7 °C)  HR:  [63-78] 63  Resp:  [16-22] 22  BP: (102-119)/(63-75) 119/75  SpO2:  [94 %-95 %] 94 %  Body mass index is 33 62 kg/m²  Input and Output Summary (last 24 hours): Intake/Output Summary (Last 24 hours) at 3/10/2023 1127  Last data filed at 3/10/2023 0001  Gross per 24 hour   Intake 540 ml   Output 700 ml   Net -160 ml       Physical Exam:   Physical Exam  Constitutional:       General: He is not in acute distress  Appearance: Normal appearance  He is not toxic-appearing  Cardiovascular:      Rate and Rhythm: Normal rate and regular rhythm  Heart sounds: Normal heart sounds  No murmur heard  Pulmonary:      Effort: Pulmonary effort is normal  No respiratory distress  Breath sounds: Normal breath sounds  No wheezing  Abdominal:      General: Abdomen is flat  There is no distension  Palpations: Abdomen is soft  Tenderness: There is no abdominal tenderness  Neurological:      General: No focal deficit present  Mental Status: He is alert and oriented to person, place, and time  Mental status is at baseline        Motor: No weakness  Additional Data:     Labs:  Results from last 7 days   Lab Units 03/07/23  0542 03/05/23  0442 03/04/23  0555   WBC Thousand/uL 4 80   < > 7 24   HEMOGLOBIN g/dL 10 3*   < > 10 7*   HEMATOCRIT % 30 2*   < > 30 6*   PLATELETS Thousands/uL 207   < > 188   NEUTROS PCT %  --   --  57   LYMPHS PCT %  --   --  26   MONOS PCT %  --   --  13*   EOS PCT %  --   --  2    < > = values in this interval not displayed  Results from last 7 days   Lab Units 03/07/23  0542 03/03/23  1524 03/03/23  1243   SODIUM mmol/L 135   < > 129*   POTASSIUM mmol/L 3 7   < > 3 9   CHLORIDE mmol/L 101   < > 90*   CO2 mmol/L 26   < > 22   BUN mg/dL 16   < > 55*   CREATININE mg/dL 0 92   < > 2 11*   ANION GAP mmol/L 8   < > 17*   CALCIUM mg/dL 8 9   < > 9 1   ALBUMIN g/dL  --   --  3 7   TOTAL BILIRUBIN mg/dL  --   --  0 85   ALK PHOS U/L  --   --  119*   ALT U/L  --   --  33   AST U/L  --   --  36   GLUCOSE RANDOM mg/dL 114   < > 300*    < > = values in this interval not displayed  Results from last 7 days   Lab Units 03/10/23  1103 03/10/23  0720 03/09/23  2113 03/09/23  1608 03/09/23  1127 03/09/23  0748 03/08/23  2020 03/08/23  1616 03/08/23  1053 03/08/23  0740 03/07/23  2025 03/07/23  1550   POC GLUCOSE mg/dl 171* 89 92 104 134 122 182* 160* 170* 101 164* 84               Lines/Drains:  Invasive Devices     Peripheral Intravenous Line  Duration           Peripheral IV 03/07/23 Dorsal (posterior); Right Hand 3 days                      Imaging: No pertinent imaging reviewed      Recent Cultures (last 7 days):   Results from last 7 days   Lab Units 03/04/23  1320   C DIFF TOXIN B BY PCR  Negative       Last 24 Hours Medication List:   Current Facility-Administered Medications   Medication Dose Route Frequency Provider Last Rate   • acetaminophen  975 mg Oral Q8H PRN Alicia Rodrigues MD     • aspirin  81 mg Oral Daily Sol Bauer MD     • atorvastatin  40 mg Oral Daily With [address] Uzma Reich MD     • clopidogrel  75 mg Oral Daily Frankie Dotson MD     • docusate sodium  100 mg Oral BID Vivek Nugent MD     • escitalopram  10 mg Oral Daily Frankie Dotson MD     • hydrOXYzine HCL  25 mg Oral Q6H PRN Frankie Dotson MD     • insulin glargine  25 Units Subcutaneous Q12H [address] Aliyah Berman MD     • insulin lispro  1-6 Units Subcutaneous 4x Daily (AC & HS) Aliyah Berman MD     • insulin lispro  10 Units Subcutaneous TID With Meals Aliyah Berman MD     • levothyroxine  150 mcg Oral Daily Frankie Dotson MD     • mirtazapine  7 5 mg Oral HS Frankie Dotson MD     • ondansetron  4 mg Intravenous Q6H PRN Frankie Dotson MD     • pantoprazole  40 mg Oral Early Morning Frankie Dotson MD     • senna  1 tablet Oral HS Vivek Nugent MD     • tamsulosin  0 4 mg Oral Daily With Jesu Gutierres MD     • traMADol  50 mg Oral Q6H PRN Aliyah Berman MD     • verapamil  360 mg Oral Daily Frankie Dotson MD          Today, Patient Was Seen By: Sana Haddad MD    **Please Note: This note may have been constructed using a voice recognition system  **

## 2023-03-10 NOTE — PLAN OF CARE
Problem: Potential for Falls  Goal: Patient will remain free of falls  Description: INTERVENTIONS:  - Educate patient/family on patient safety including physical limitations  - Instruct patient to call for assistance with activity   - Consult OT/PT to assist with strengthening/mobility   - Keep Call bell within reach  - Keep bed low and locked with side rails adjusted as appropriate  - Keep care items and personal belongings within reach  - Initiate and maintain comfort rounds  - Make Fall Risk Sign visible to staff  - Offer Toileting every 2 Hours, in advance of need  - Initiate/Maintain bed/chair alarm  - Obtain necessary fall risk management equipment  - Apply yellow socks and bracelet for high fall risk patients  - Consider moving patient to room near nurses station  3/10/2023 0934 by Ada Benoit LPN  Outcome: Progressing  3/10/2023 Sam Velásquez by Ada Benoit LPN  Outcome: Progressing     Problem: PAIN - ADULT  Goal: Verbalizes/displays adequate comfort level or baseline comfort level  Description: Interventions:  - Encourage patient to monitor pain and request assistance  - Assess pain using appropriate pain scale  - Administer analgesics based on type and severity of pain and evaluate response  - Implement non-pharmacological measures as appropriate and evaluate response  - Consider cultural and social influences on pain and pain management  - Notify physician/advanced practitioner if interventions unsuccessful or patient reports new pain  3/10/2023 0934 by Ada Benoit LPN  Outcome: Progressing  3/10/2023 0931 by Ada Benoit LPN  Outcome: Progressing     Problem: INFECTION - ADULT  Goal: Absence or prevention of progression during hospitalization  Description: INTERVENTIONS:  - Assess and monitor for signs and symptoms of infection  - Monitor lab/diagnostic results  - Monitor all insertion sites, i e  indwelling lines, tubes, and drains  - Monitor endotracheal if appropriate and nasal secretions for changes in amount and color  - [place] appropriate cooling/warming therapies per order  - Administer medications as ordered  - Instruct and encourage patient and family to use good hand hygiene technique  - Identify and instruct in appropriate isolation precautions for identified infection/condition  3/10/2023 0934 by Figueroa Swan LPN  Outcome: Progressing  3/10/2023 0931 by Figueroa Swan LPN  Outcome: Progressing  Goal: Absence of fever/infection during neutropenic period  Description: INTERVENTIONS:  - Monitor WBC    3/10/2023 0934 by Figueroa Swan LPN  Outcome: Progressing  3/10/2023 0931 by Figueroa Swan LPN  Outcome: Progressing     Problem: SAFETY ADULT  Goal: Patient will remain free of falls  Description: INTERVENTIONS:  - Educate patient/family on patient safety including physical limitations  - Instruct patient to call for assistance with activity   - Consult OT/PT to assist with strengthening/mobility   - Keep Call bell within reach  - Keep bed low and locked with side rails adjusted as appropriate  - Keep care items and personal belongings within reach  - Initiate and maintain comfort rounds  - Make Fall Risk Sign visible to staff  - Offer Toileting every 2 Hours, in advance of need  - Initiate/Maintain bed/chair alarm  - Obtain necessary fall risk management equipment  - Apply yellow socks and bracelet for high fall risk patients  - Consider moving patient to room near nurses station  3/10/2023 0934 by Figueroa Swan LPN  Outcome: Progressing  3/10/2023 Sam Velásquez by Figueroa Swan LPN  Outcome: Progressing  Goal: Maintain or return to baseline ADL function  Description: INTERVENTIONS:  -  Assess patient's ability to carry out ADLs; assess patient's baseline for ADL function and identify physical deficits which impact ability to perform ADLs (bathing, care of mouth/teeth, toileting, grooming, dressing, etc )  - Assess/evaluate cause of self-care deficits   - Assess range of motion  - Assess patient's mobility; develop plan if impaired  - Assess patient's need for assistive devices and provide as appropriate  - Encourage maximum independence but intervene and supervise when necessary  - Involve family in performance of ADLs  - Assess for home care needs following discharge   - Consider OT consult to assist with ADL evaluation and planning for discharge  - Provide patient education as appropriate  3/10/2023 0934 by Tc Tomlinson LPN  Outcome: Progressing  3/10/2023 0931 by Tc Tomlinson LPN  Outcome: Progressing  Goal: Maintains/Returns to pre admission functional level  Description: INTERVENTIONS:  - Perform BMAT or MOVE assessment daily    - Set and communicate daily mobility goal to care team and patient/family/caregiver  - Collaborate with rehabilitation services on mobility goals if consulted  - Perform Range of Motion 2 times a day  - Reposition patient every 2 hours    - Dangle patient 2 times a day  - Stand patient 2 times a day  - Ambulate patient 2 times a day  - Out of bed to chair 2 times a day   - Out of bed for meals 2 times a day  - Out of bed for toileting  - Record patient progress and toleration of activity level   3/10/2023 0934 by Tc Tomlinson LPN  Outcome: Progressing  3/10/2023 0931 by Tc Tomlinson LPN  Outcome: Progressing     Problem: DISCHARGE PLANNING  Goal: Discharge to home or other facility with appropriate resources  Description: INTERVENTIONS:  - Identify barriers to discharge w/patient and caregiver  - Arrange for needed discharge resources and transportation as appropriate  - Identify discharge learning needs (meds, wound care, etc )  - Arrange for interpretive services to assist at discharge as needed  - Refer to Case Management Department for coordinating discharge planning if the patient needs post-hospital services based on physician/advanced practitioner order or complex needs related to functional status, cognitive ability, or social support system  3/10/2023 0934 by Tc Tomlinson LPN  Outcome: Progressing  3/10/2023 0931 by Devora Thorne LPN  Outcome: Progressing     Problem: Knowledge Deficit  Goal: Patient/family/caregiver demonstrates understanding of disease process, treatment plan, medications, and discharge instructions  Description: Complete learning assessment and assess knowledge base  Interventions:  - Provide teaching at level of understanding  - Provide teaching via preferred learning methods  3/10/2023 0934 by Devora Thorne LPN  Outcome: Progressing  3/10/2023 0931 by Devora Thorne LPN  Outcome: Progressing     Problem: Nutrition/Hydration-ADULT  Goal: Nutrient/Hydration intake appropriate for improving, restoring or maintaining nutritional needs  Description: Monitor and assess patient's nutrition/hydration status for malnutrition  Collaborate with interdisciplinary team and initiate plan and interventions as ordered  Monitor patient's weight and dietary intake as ordered or per policy  Utilize nutrition screening tool and intervene as necessary  Determine patient's food preferences and provide high-protein, high-caloric foods as appropriate       INTERVENTIONS:  - Monitor oral intake, urinary output, labs, and treatment plans  - Assess nutrition and hydration status and recommend course of action  - Evaluate amount of meals eaten  - Assist patient with eating if necessary   - Allow adequate time for meals  - Recommend/ encourage appropriate diets, oral nutritional supplements, and vitamin/mineral supplements  - Order, calculate, and assess calorie counts as needed  - Recommend, monitor, and adjust tube feedings and TPN/PPN based on assessed needs  - Assess need for intravenous fluids  - Provide specific nutrition/hydration education as appropriate  - Include patient/family/caregiver in decisions related to nutrition  3/10/2023 0934 by Devora Thorne LPN  Outcome: Progressing  3/10/2023 0931 by Devora Thorne LPN  Outcome: Progressing     Problem: MOBILITY - ADULT  Goal: Maintain or return to baseline ADL function  Description: INTERVENTIONS:  -  Assess patient's ability to carry out ADLs; assess patient's baseline for ADL function and identify physical deficits which impact ability to perform ADLs (bathing, care of mouth/teeth, toileting, grooming, dressing, etc )  - Assess/evaluate cause of self-care deficits   - Assess range of motion  - Assess patient's mobility; develop plan if impaired  - Assess patient's need for assistive devices and provide as appropriate  - Encourage maximum independence but intervene and supervise when necessary  - Involve family in performance of ADLs  - Assess for home care needs following discharge   - Consider OT consult to assist with ADL evaluation and planning for discharge  - Provide patient education as appropriate  3/10/2023 0934 by Mark Joel LPN  Outcome: Progressing  3/10/2023 0931 by Mark Joel LPN  Outcome: Progressing  Goal: Maintains/Returns to pre admission functional level  Description: INTERVENTIONS:  - Perform BMAT or MOVE assessment daily    - Set and communicate daily mobility goal to care team and patient/family/caregiver  - Collaborate with rehabilitation services on mobility goals if consulted  - Perform Range of Motion 2 times a day  - Reposition patient every 2 hours    - Dangle patient 2 times a day  - Stand patient 2 times a day  - Ambulate patient 2 times a day  - Out of bed to chair 2 times a day   - Out of bed for meals 2 times a day  - Out of bed for toileting  - Record patient progress and toleration of activity level   3/10/2023 0934 by Mark Joel LPN  Outcome: Progressing  3/10/2023 0931 by Mark Joel LPN  Outcome: Progressing     Problem: Prexisting or High Potential for Compromised Skin Integrity  Goal: Skin integrity is maintained or improved  Description: INTERVENTIONS:  - Identify patients at risk for skin breakdown  - Assess and monitor skin integrity  - Assess and monitor nutrition and hydration status  - Monitor labs   - Assess for incontinence   - Turn and reposition patient  - Assist with mobility/ambulation  - Relieve pressure over bony prominences  - Avoid friction and shearing  - Provide appropriate hygiene as needed including keeping skin clean and dry  - Evaluate need for skin moisturizer/barrier cream  - Collaborate with interdisciplinary team   - Patient/family teaching  - Consider wound care consult   3/10/2023 0934 by Jane Power LPN  Outcome: Progressing  3/10/2023 0931 by Jane Power LPN  Outcome: Progressing

## 2023-03-10 NOTE — PLAN OF CARE
Problem: Potential for Falls  Goal: Patient will remain free of falls  Description: INTERVENTIONS:  - Educate patient/family on patient safety including physical limitations  - Instruct patient to call for assistance with activity   - Consult OT/PT to assist with strengthening/mobility   - Keep Call bell within reach  - Keep bed low and locked with side rails adjusted as appropriate  - Keep care items and personal belongings within reach  - Initiate and maintain comfort rounds  - Make Fall Risk Sign visible to staff  - Offer Toileting every 2 Hours, in advance of need  - Initiate/Maintain bed/chair alarm  - Obtain necessary fall risk management equipment  - Apply yellow socks and bracelet for high fall risk patients  - Consider moving patient to room near nurses station  Outcome: Progressing     Problem: PAIN - ADULT  Goal: Verbalizes/displays adequate comfort level or baseline comfort level  Description: Interventions:  - Encourage patient to monitor pain and request assistance  - Assess pain using appropriate pain scale  - Administer analgesics based on type and severity of pain and evaluate response  - Implement non-pharmacological measures as appropriate and evaluate response  - Consider cultural and social influences on pain and pain management  - Notify physician/advanced practitioner if interventions unsuccessful or patient reports new pain  Outcome: Progressing     Problem: INFECTION - ADULT  Goal: Absence or prevention of progression during hospitalization  Description: INTERVENTIONS:  - Assess and monitor for signs and symptoms of infection  - Monitor lab/diagnostic results  - Monitor all insertion sites, i e  indwelling lines, tubes, and drains  - Monitor endotracheal if appropriate and nasal secretions for changes in amount and color  - [place] appropriate cooling/warming therapies per order  - Administer medications as ordered  - Instruct and encourage patient and family to use good hand hygiene technique  - Identify and instruct in appropriate isolation precautions for identified infection/condition  Outcome: Progressing  Goal: Absence of fever/infection during neutropenic period  Description: INTERVENTIONS:  - Monitor WBC    Outcome: Progressing     Problem: SAFETY ADULT  Goal: Patient will remain free of falls  Description: INTERVENTIONS:  - Educate patient/family on patient safety including physical limitations  - Instruct patient to call for assistance with activity   - Consult OT/PT to assist with strengthening/mobility   - Keep Call bell within reach  - Keep bed low and locked with side rails adjusted as appropriate  - Keep care items and personal belongings within reach  - Initiate and maintain comfort rounds  - Make Fall Risk Sign visible to staff  - Offer Toileting every 2 Hours, in advance of need  - Initiate/Maintain bed/chair alarm  - Obtain necessary fall risk management equipment  - Apply yellow socks and bracelet for high fall risk patients  - Consider moving patient to room near nurses station  Outcome: Progressing  Goal: Maintain or return to baseline ADL function  Description: INTERVENTIONS:  -  Assess patient's ability to carry out ADLs; assess patient's baseline for ADL function and identify physical deficits which impact ability to perform ADLs (bathing, care of mouth/teeth, toileting, grooming, dressing, etc )  - Assess/evaluate cause of self-care deficits   - Assess range of motion  - Assess patient's mobility; develop plan if impaired  - Assess patient's need for assistive devices and provide as appropriate  - Encourage maximum independence but intervene and supervise when necessary  - Involve family in performance of ADLs  - Assess for home care needs following discharge   - Consider OT consult to assist with ADL evaluation and planning for discharge  - Provide patient education as appropriate  Outcome: Progressing  Goal: Maintains/Returns to pre admission functional level  Description: INTERVENTIONS:  - Perform BMAT or MOVE assessment daily    - Set and communicate daily mobility goal to care team and patient/family/caregiver  - Collaborate with rehabilitation services on mobility goals if consulted  - Perform Range of Motion 2 times a day  - Reposition patient every 2 hours  - Dangle patient 2 times a day  - Stand patient 2 times a day  - Ambulate patient 2 times a day  - Out of bed to chair 2 times a day   - Out of bed for meals 2 times a day  - Out of bed for toileting  - Record patient progress and toleration of activity level   Outcome: Progressing     Problem: DISCHARGE PLANNING  Goal: Discharge to home or other facility with appropriate resources  Description: INTERVENTIONS:  - Identify barriers to discharge w/patient and caregiver  - Arrange for needed discharge resources and transportation as appropriate  - Identify discharge learning needs (meds, wound care, etc )  - Arrange for interpretive services to assist at discharge as needed  - Refer to Case Management Department for coordinating discharge planning if the patient needs post-hospital services based on physician/advanced practitioner order or complex needs related to functional status, cognitive ability, or social support system  Outcome: Progressing     Problem: Knowledge Deficit  Goal: Patient/family/caregiver demonstrates understanding of disease process, treatment plan, medications, and discharge instructions  Description: Complete learning assessment and assess knowledge base  Interventions:  - Provide teaching at level of understanding  - Provide teaching via preferred learning methods  Outcome: Progressing     Problem: Nutrition/Hydration-ADULT  Goal: Nutrient/Hydration intake appropriate for improving, restoring or maintaining nutritional needs  Description: Monitor and assess patient's nutrition/hydration status for malnutrition   Collaborate with interdisciplinary team and initiate plan and interventions as ordered  Monitor patient's weight and dietary intake as ordered or per policy  Utilize nutrition screening tool and intervene as necessary  Determine patient's food preferences and provide high-protein, high-caloric foods as appropriate  INTERVENTIONS:  - Monitor oral intake, urinary output, labs, and treatment plans  - Assess nutrition and hydration status and recommend course of action  - Evaluate amount of meals eaten  - Assist patient with eating if necessary   - Allow adequate time for meals  - Recommend/ encourage appropriate diets, oral nutritional supplements, and vitamin/mineral supplements  - Order, calculate, and assess calorie counts as needed  - Recommend, monitor, and adjust tube feedings and TPN/PPN based on assessed needs  - Assess need for intravenous fluids  - Provide specific nutrition/hydration education as appropriate  - Include patient/family/caregiver in decisions related to nutrition  Outcome: Progressing     Problem: MOBILITY - ADULT  Goal: Maintain or return to baseline ADL function  Description: INTERVENTIONS:  -  Assess patient's ability to carry out ADLs; assess patient's baseline for ADL function and identify physical deficits which impact ability to perform ADLs (bathing, care of mouth/teeth, toileting, grooming, dressing, etc )  - Assess/evaluate cause of self-care deficits   - Assess range of motion  - Assess patient's mobility; develop plan if impaired  - Assess patient's need for assistive devices and provide as appropriate  - Encourage maximum independence but intervene and supervise when necessary  - Involve family in performance of ADLs  - Assess for home care needs following discharge   - Consider OT consult to assist with ADL evaluation and planning for discharge  - Provide patient education as appropriate  Outcome: Progressing  Goal: Maintains/Returns to pre admission functional level  Description: INTERVENTIONS:  - Perform BMAT or MOVE assessment daily  - Set and communicate daily mobility goal to care team and patient/family/caregiver  - Collaborate with rehabilitation services on mobility goals if consulted  - Perform Range of Motion 2 times a day  - Reposition patient every 2 hours    - Dangle patient 2 times a day  - Stand patient 2 times a day  - Ambulate patient 2 times a day  - Out of bed to chair 2 times a day   - Out of bed for meals 2 times a day  - Out of bed for toileting  - Record patient progress and toleration of activity level   Outcome: Progressing     Problem: Prexisting or High Potential for Compromised Skin Integrity  Goal: Skin integrity is maintained or improved  Description: INTERVENTIONS:  - Identify patients at risk for skin breakdown  - Assess and monitor skin integrity  - Assess and monitor nutrition and hydration status  - Monitor labs   - Assess for incontinence   - Turn and reposition patient  - Assist with mobility/ambulation  - Relieve pressure over bony prominences  - Avoid friction and shearing  - Provide appropriate hygiene as needed including keeping skin clean and dry  - Evaluate need for skin moisturizer/barrier cream  - Collaborate with interdisciplinary team   - Patient/family teaching  - Consider wound care consult   Outcome: Progressing

## 2023-03-10 NOTE — ASSESSMENT & PLAN NOTE
Lab Results   Component Value Date    HGBA1C 11 1 (H) 11/14/2022       Recent Labs     03/09/23  1608 03/09/23  2113 03/10/23  0720 03/10/23  1103   POCGLU 104 92 89 171*       Blood Sugar Average: Last 72 hrs:  (P) 130 [id number]     · Gap closed    · Transitioned to subcutaneous basal/bolus insulin  · Insulin sliding scale with Accu-Cheks  · Diabetic diet  · Resolved

## 2023-03-10 NOTE — PLAN OF CARE
Problem: OCCUPATIONAL THERAPY ADULT  Goal: Performs self-care activities at highest level of function for planned discharge setting  See evaluation for individualized goals  Description: Treatment Interventions: ADL retraining, Functional transfer training, UE strengthening/ROM, Endurance training, Patient/family training, Equipment evaluation/education, Compensatory technique education, Continued evaluation, Energy conservation, Activityengagement          See flowsheet documentation for full assessment, interventions and recommendations  Note: Limitation: Decreased ADL status, Decreased UE ROM, Decreased UE strength, Decreased endurance, Decreased sensation, Decreased fine motor control, Decreased self-care trans, Decreased high-level ADLs, Mood limitation  Prognosis: Good  Assessment: Patient participated in Skilled OT session this date with interventions consisting of therapeutic exercise to: increase functional use of BUEs, increase BUE muscle strength ,  therapeutic activities to: increase activity tolerance, increase cardiovascular endurance , increase dynamic sit/ stand balance during functional activity  and increase postural control   Patient agreeable to OT treatment session, upon arrival patient was found supine in bed  Patient requiring verbal cues for correct technique and verbal cues for pacing thru activity steps  Patient reported he has been sitting at edge of bed and sponge bathing with set up assist for UB , and mod assist for LB ADLs  patient did not want to attempt any self care tasks at this time, however agreeable to complete exercises/ bed mobility and sitting at edge of bed  Patient continues to be functioning below baseline level, occupational performance remains limited secondary to factors listed above and increased risk for falls and injury  From OT standpoint, recommendation at time of d/c would be Short Term Rehab     Patient to benefit from continued Occupational Therapy treatment while in the hospital to address deficits as defined above and maximize level of functional independence with ADLs and functional mobility       OT Discharge Recommendation: Post acute rehabilitation services

## 2023-03-10 NOTE — UTILIZATION REVIEW
Continued Stay Review    Date: 3/10                         Current Patient Class: IP Current Level of Care: MS    HPI:57 y o  male initially admitted on 3/3    Assessment/Plan: pt had a BM overnight   Feels well today   PT rec rehab placement   CM working on Adaline Defabiola        Vital Signs:   03/10/23 07:21:37 98 °F (36 7 °C) -- 22 119/75 90         Pertinent Labs/Diagnostic Results:   Results from last 7 days   Lab Units 03/03/23  1243   SARS-COV-2  Negative     Results from last 7 days   Lab Units 03/07/23  0542 03/06/23  0451 03/05/23  0442 03/04/23  0555 03/03/23  1243   WBC Thousand/uL 4 80 6 05 6 54 7 24 7 75   HEMOGLOBIN g/dL 10 3* 10 5* 10 2* 10 7* 11 4*   HEMATOCRIT % 30 2* 30 2* 29 8* 30 6* 32 7*   PLATELETS Thousands/uL 207 186 183 188 199   NEUTROS ABS Thousands/µL  --   --   --  4 12 5 20       Results from last 7 days   Lab Units 03/07/23  0542 03/06/23  0451 03/05/23  0442 03/04/23  1021 03/04/23  0555   SODIUM mmol/L 135 133* 133* 130* 131*   POTASSIUM mmol/L 3 7 3 7 3 4* 3 6 3 6   CHLORIDE mmol/L 101 102 99 98 98   CO2 mmol/L 26 23 25 22 22   ANION GAP mmol/L 8 8 9 10 11   BUN mg/dL 16 18 28* 39* 42*   CREATININE mg/dL 0 92 0 98 1 15 1 42* 1 49*   EGFR ml/min/1 73sq m 90 84 69 53 50   CALCIUM mg/dL 8 9 8 9 8 5 8 6 8 7     Results from last 7 days   Lab Units 03/03/23  1243   AST U/L 36   ALT U/L 33   ALK PHOS U/L 119*   TOTAL PROTEIN g/dL 7 1   ALBUMIN g/dL 3 7   TOTAL BILIRUBIN mg/dL 0 85     Results from last 7 days   Lab Units 03/10/23  1103 03/10/23  0720 03/09/23  2113 03/09/23  1608 03/09/23  1127 03/09/23  0748 03/08/23  2020 03/08/23  1616 03/08/23  1053 03/08/23  0740 03/07/23  2025 03/07/23  1550   POC GLUCOSE mg/dl 171* 89 92 104 134 122 182* 160* 170* 101 164* 84     Results from last 7 days   Lab Units 03/07/23  0542 03/06/23  0451 03/05/23  0442 03/04/23  1021 03/04/23  0555 03/04/23  0000 03/03/23  2050 03/03/23  1524 03/03/23  1243   GLUCOSE RANDOM mg/dL 114 131 155* 175* 148* 186* 240* 290* 300*     BETA-HYDROXYBUTYRATE   Date Value Ref Range Status   03/03/2023 3 4 (H) <0 6 mmol/L Final     Results from last 7 days   Lab Units 03/04/23  0000 03/03/23  1717   PH FALGUNI  7 456* 7 469*   PCO2 FALGUNI mm Hg 31 2* 33 0*   PO2 FALGUNI mm Hg 61 0* 47 4*   HCO3 FALGUNI mmol/L 21 5* 23 4*   BASE EXC FALGUNI mmol/L -1 6 0 3   O2 CONTENT FALGUNI ml/dL 15 1 14 4   O2 HGB, VENOUS % 88 9* 82 2*       Results from last 7 days   Lab Units 03/03/23  1717 03/03/23  1524 03/03/23  1243   HS TNI 0HR ng/L  --   --  9   HS TNI 2HR ng/L  --  10  --    HSTNI D2 ng/L  --  1  --    HS TNI 4HR ng/L 9  --   --    HSTNI D4 ng/L 0  --   --        Results from last 7 days   Lab Units 03/04/23  0600   CLARITY UA  Clear   COLOR UA  Light Yellow   SPEC GRAV UA  1 014   PH UA  5 0   GLUCOSE UA mg/dl 1000 (1%)*   KETONES UA mg/dl Trace*   BLOOD UA  Negative   PROTEIN UA mg/dl Negative   NITRITE UA  Negative   BILIRUBIN UA  Negative   UROBILINOGEN UA (BE) mg/dl <2 0   LEUKOCYTES UA  Negative   WBC UA /hpf 1-2   RBC UA /hpf 1-2   BACTERIA UA /hpf None Seen   EPITHELIAL CELLS WET PREP /hpf None Seen     Results from last 7 days   Lab Units 03/03/23  1243   INFLUENZA A PCR  Negative   INFLUENZA B PCR  Negative   RSV PCR  Negative     Results from last 7 days   Lab Units 03/04/23  1320   C DIFF TOXIN B BY PCR  Negative         Medications:   Scheduled Medications:  aspirin, 81 mg, Oral, Daily  atorvastatin, 40 mg, Oral, Daily With Dinner  clopidogrel, 75 mg, Oral, Daily  docusate sodium, 100 mg, Oral, BID  escitalopram, 10 mg, Oral, Daily  insulin glargine, 25 Units, Subcutaneous, Q12H TESS  insulin lispro, 1-6 Units, Subcutaneous, 4x Daily (AC & HS)  insulin lispro, 10 Units, Subcutaneous, TID With Meals  levothyroxine, 150 mcg, Oral, Daily  mirtazapine, 7 5 mg, Oral, HS  pantoprazole, 40 mg, Oral, Early Morning  senna, 1 tablet, Oral, HS  tamsulosin, 0 4 mg, Oral, Daily With Dinner  verapamil, 360 mg, Oral, Daily      Continuous IV Infusions:     PRN Meds:  acetaminophen, 975 mg, Oral, Q8H PRN  hydrOXYzine HCL, 25 mg, Oral, Q6H PRN  ondansetron, 4 mg, Intravenous, Q6H PRN  traMADol, 50 mg, Oral, Q6H PRN        Discharge Plan: [facility]     Network Utilization Review Department  ATTENTION: Please call with any questions or concerns to [phone] and carefully listen to the prompts so that you are directed to the right person  All voicemails are confidential   Lucita Carpenter all requests for admission clinical reviews, approved or denied determinations and any other requests to dedicated fax number below belonging to the campus where the patient is receiving treatment   List of dedicated fax numbers for the Facilities:  [address] DENIALS (Administrative/Medical Necessity) [phone]   [address] (Maternity/NICU/Pediatrics) [phone]   [address] [phone]   [address] [phone]   [address] [address] [address] [phone]   1555 [facility] [address] 8[address] [facility] [phone]

## 2023-03-10 NOTE — ASSESSMENT & PLAN NOTE
· No bowel movement in last 48 hours  · Rehab will not take patient until bowel movement  · Added milk of magnesia   · Now resolved

## 2023-03-10 NOTE — PLAN OF CARE
Problem: PHYSICAL THERAPY ADULT  Goal: Performs mobility at highest level of function for planned discharge setting  See evaluation for individualized goals  Description: Treatment/Interventions: Functional transfer training, LE strengthening/ROM, Therapeutic exercise, Endurance training, Patient/family training, Bed mobility, Continued evaluation, Spoke to nursing, OT          See flowsheet documentation for full assessment, interventions and recommendations  Outcome: Progressing  Note: Prognosis: Fair  Problem List: Decreased strength, Decreased endurance, Impaired balance, Decreased mobility, Pain, Orthopedic restrictions, Decreased safety awareness  Assessment: Pt seen for PT treatment session this date, consisting of therapeutic exercise on LLE only  Since previous session, pt has made fair progress in terms of activity tolerance, pain levels  Pertinent barriers during this session include pain and activity tolerance  Patient greeted supine in bed and reporting 8/10 pain to R knee and agreed to supine TE; however, refused transfer out of bed  Upon PT arrival, R knee brace not donned; PT educated patient in importance of knee brace and assisted in donning with maxA  PT instructed patient in supine LLE TE x10 reps each exercise with mod verbal cues for slow and controlled movement; patient tolerated well  Current goals and POC remain appropriate, pt continues to have rehab potential and is making fair progress towards STGs  Pt prognosis for achieving goals is fair, pending pt progress with hospitalization/medical status improvements, and indicated by ability to follow directions, motivation and awareness  Pt limited d/t the presence of intractable pain, fear of pain provocation, fear of falling and non compliance  PT recommends post acute rehabilitation services upon discharge  Pt continues to be functioning below baseline level, and remains limited 2* factors listed above   PT will continue to see pt during current hospitalization in order to address the deficits listed above and provide interventions consistent w/ POC in effort to achieve STGs  Barriers to Discharge: Decreased caregiver support, Inaccessible home environment     PT Discharge Recommendation: Post acute rehabilitation services    See flowsheet documentation for full assessment     Margi Saavedra; PT, DPT

## 2023-03-10 NOTE — PHYSICAL THERAPY NOTE
PHYSICAL THERAPY TREATMENT  NAME:  Leatha Galvan  DATE: 03/10/23    AGE:   59 y o   Mrn:   [id number]  ADMIT DX:  Suicidal ideation [R45 851]  Ketosis ([facility] [address] ) [E88 89]  Hyperglycemia [R73 9]  Failure to thrive in adult [R62 7]  Encounter for psychological evaluation [Z00 8]  Type 2 diabetes mellitus with diabetic peripheral angiopathy without gangrene, with long-term current use of insulin ([facility] [address] ) [E11 51, Z79 4]  Problem List:   Patient Active Problem List   Diagnosis    Hypertension, essential    Gastroesophageal reflux disease without esophagitis    Hyperlipidemia    History of nonadherence to medical treatment    HHS vs DKA    Right-sided tinnitus    Colon polyps    Elevated liver enzymes    Health maintenance examination    PAD (peripheral artery disease) ([facility] )    Thyroid cancer ([facility] )    Urinary retention    Hypomagnesemia    Anemia    Abnormal EKG    Insomnia    Persistent proteinuria    Acquired absence of right foot ([facility] )    Acquired hypothyroidism    Physical deconditioning    Type 2 diabetes mellitus with diabetic peripheral angiopathy without gangrene ([facility] [address] )    Dupuytren's contracture of right hand    Stage 3b chronic kidney disease ([facility] [address] )    Hyperkalemia    Alcoholism ([facility] [address] )    Closed fracture of right tibial plateau    Obesity, Class I, BMI 30 0-34 9 (see actual BMI)    MARKELL (acute kidney injury) ([facility] )    Chronic kidney disease-mineral and bone disorder    Acquired genu varum of right lower extremity    Failure to thrive in adult    Depression and anxiety    Constipation       Past Medical History  Past Medical History:   Diagnosis Date    Broken internal right knee prosthesis ([facility] ) 01/2023    Chronic kidney disease     Colon polyp     Diabetes mellitus ([facility] [address] )     Hyperlipidemia     Hypertension     Thyroid cancer ([facility] [address] )        Past Surgical History  Past Surgical History:   Procedure Laterality Date    COLONOSCOPY      FOOT AMPUTATION Right     FOOT SURGERY Right 2014    IR AORTAGRAM WITH RUN-OFF  08/06/2020 IR TUNNELED CENTRAL LINE PLACEMENT  09/08/2020    IR TUNNELED CENTRAL LINE REMOVAL  09/28/2020    WA AMPUTATION FOOT TRANSMETARSAL Right 09/03/2020    Procedure: AMPUTATION TRANSMETATARSAL (TMA);  Surgeon: Nan Womack DPM;  Location: [facility];  Service: Podiatry    WA THYROIDECTOMY TOTAL/COMPLETE N/A 02/03/2021    Procedure: TOTAL THYROIDECTOMY WITH NIMS MONITORING;  Surgeon: Kraig Flood MD;  Location: [facility];  Service: ENT    TOE AMPUTATION Right 08/11/2020    Procedure: AMPUTATION TOE;  Surgeon: Nan Womack DPM;  Location: [facility];  Service: [address] THYROID BIOPSY  07/02/2020       Length Of Stay: 7  Performed at least 2 patient identifiers during session: Name and Birthday       03/10/23 1010   PT Last Visit   PT Visit Date 03/10/23   Note Type   Note Type Treatment   Pain Assessment   Pain Assessment Tool 0-10   Pain Score 8   Pain Location/Orientation Orientation: Right;Location: Knee   Pain Onset/Description Onset: Ongoing   Patient's Stated Pain Goal No pain   Hospital Pain Intervention(s) Repositioned; Ambulation/increased activity; Emotional support   Multiple Pain Sites No   Restrictions/Precautions   Weight Bearing Precautions Per Order Yes   RLE Weight Bearing Per Order NWB   Braces or Orthoses Knee brace  (RLE)   Other Precautions Bed Alarm; Fall Risk;Pain;Multiple lines;WBS  (RLE NWB)   General   Chart Reviewed Yes   Response to Previous Treatment Patient with no complaints from previous session  Family/Caregiver Present No   Cognition   Overall Cognitive Status WFL   Arousal/Participation Alert; Responsive   Attention Within functional limits   Orientation Level Oriented X4   Memory Within functional limits   Following Commands Follows all commands and directions without difficulty   Comments Pt agreeable to PT session   Subjective   Subjective "I don't want to get out of bed at all "   Bed Mobility   Supine to Sit 5  Supervision   Additional items Assist x 1; Increased time required;Verbal cues;HOB elevated; Bedrails   Sit to Supine 5  Supervision   Additional items Assist x 1; Increased time required;Verbal cues;HOB elevated; Bedrails   Additional Comments Pt greeted supine in bed without knee brace donned; PT educated patient in importance of knee brace compliance and PT assisted to don with maxA  Knee brace maintained in place throughout session   Transfers   Additional Comments Pt refused out of bed transfers/mobility   Ambulation/Elevation   Gait pattern Not tested   Balance   Static Sitting Fair +   Endurance Deficit   Endurance Deficit Yes   Endurance Deficit Description decreased activity tolerance   Activity Tolerance   Activity Tolerance Patient limited by fatigue;Patient limited by pain   Medical Staff Made Aware Co treatment with ILA [place] secondary to complex medical condition of pt, possible A of 2 required to achieve and maintain transitional movements, requiring the need of skilled therapeutic intervention of 2 therapists to achieve delivery of services  Nurse Made Aware BONI Cool confirmed pt appropriate for PT session and made aware of session outcomes   Exercises   Heelslides 10 reps; Supine;AROM; Left   Hip Abduction 10 reps; Supine;AROM; Left   Ankle Pumps 10 reps; Supine;AROM; Left   Assessment   Prognosis Fair   Problem List Decreased strength;Decreased endurance; Impaired balance;Decreased mobility;Pain;Orthopedic restrictions;Decreased safety awareness   Assessment Pt seen for PT treatment session this date, consisting of therapeutic exercise on LLE only  Since previous session, pt has made fair progress in terms of activity tolerance, pain levels  Pertinent barriers during this session include pain and activity tolerance  Patient greeted supine in bed and reporting 8/10 pain to R knee and agreed to supine TE; however, refused transfer out of bed   Upon PT arrival, R knee brace not donned; PT educated patient in importance of knee brace and assisted in donning with maxA  PT instructed patient in supine LLE TE x10 reps each exercise with mod verbal cues for slow and controlled movement; patient tolerated well  Current goals and POC remain appropriate, pt continues to have rehab potential and is making fair progress towards STGs  Pt prognosis for achieving goals is fair, pending pt progress with hospitalization/medical status improvements, and indicated by ability to follow directions, motivation and awareness  Pt limited d/t the presence of intractable pain, fear of pain provocation, fear of falling and non compliance  PT recommends post acute rehabilitation services upon discharge  Pt continues to be functioning below baseline level, and remains limited 2* factors listed above  PT will continue to see pt during current hospitalization in order to address the deficits listed above and provide interventions consistent w/ POC in effort to achieve STGs  Barriers to Discharge Decreased caregiver support; Inaccessible home environment   Goals   Patient Goals to be able to walk and get to the restroom at home   STG Expiration Date 03/17/23   Short Term Goal #1 Pts goals remain appropriate  Continue with plan of care   PT Treatment Day 2   Plan   Treatment/Interventions Functional transfer training; Therapeutic exercise; Endurance training;Patient/family training;Bed mobility;Continued evaluation;Spoke to nursing;OT   Progress Progressing toward goals   PT Frequency 3-5x/wk   Recommendation   PT Discharge Recommendation Post acute rehabilitation services   AM-PAC Basic Mobility Inpatient   Turning in Flat Bed Without Bedrails 3   Lying on Back to Sitting on Edge of Flat Bed Without Bedrails 3   Moving Bed to Chair 2   Standing Up From Chair Using Arms 1   Walk in Room 1   Climb 3-5 Stairs With Railing 1   Basic Mobility Inpatient Raw Score 11   Basic Mobility Standardized Score 30 25   Turning Head Towards Sound 4   Follow Simple Instructions 4   Low Function Basic Mobility Raw Score 19   Low Function Basic Mobility Standardized Score 31 06   Highest Level Of Mobility   -M Goal 4: Move to chair/commode   -HLM Achieved 3: Sit at edge of bed   Education   Education Provided Mobility training;Assistive device; Other  (Knee brace)   End of Consult   Patient Position at End of Consult All needs within reach; Supine  (spouse present at and of session)   Time In: 1010  Time Out: [address] [facility]  Total Treatment Minutes: MIKEY Gold

## 2023-03-10 NOTE — OCCUPATIONAL THERAPY NOTE
Occupational Therapy Treatment Note        Patient Name: Catherine Chauhan  UAHSO'E Date: 3/10/2023       03/10/23 1028   OT Last Visit   OT Visit Date 03/10/23   Note Type   Note Type Treatment   Pain Assessment   Pain Assessment Tool 0-10   Pain Score 8   Pain Location/Orientation Orientation: Right;Location: Knee   Pain Onset/Description Onset: Ongoing   Patient's Stated Pain Goal No pain   Hospital Pain Intervention(s) Repositioned; Ambulation/increased activity; Emotional support   Multiple Pain Sites No   Restrictions/Precautions   Weight Bearing Precautions Per Order Yes   RLE Weight Bearing Per Order NWB   Braces or Orthoses Knee brace  (RLE)   Other Precautions Bed Alarm; Fall Risk;Pain;Multiple lines;WBS  (RLE NWB)   ADL   Where Assessed Supine, bed  (patient reported he has been sitting at edge of bed and sponge bathing with set up assist for UB , and mod assist for LB ADLs  patient did not want to attempt any self care tasks at this time, however agreeable to complete exercises in bed )   Bed Mobility   Supine to Sit 5  Supervision   Additional items HOB elevated; Increased time required;Verbal cues   Sit to Supine 5  Supervision   Additional items Assist x 1; Increased time required;Verbal cues;HOB elevated; Bedrails   Additional Comments Pt greeted supine in bed without knee brace donned; PT educated patient in importance of knee brace compliance and PT assisted to don with maxA  Knee brace maintained in place throughout session   Therapeutic Exercise - ROM   UE-ROM Yes   ROM- Right Upper Extremities   R Shoulder AROM; Flexion;ABduction; Extension;Horizontal ABduction   R Elbow AROM;Elbow flexion;Elbow extension   R Weight/Reps/Sets 2 sets of 10 repetitions   ROM - Left Upper Extremities    L Shoulder AROM; Flexion;ABduction; Extension;Horizontal ABduction   L Elbow AROM;Elbow flexion;Elbow extension   L Weight/Reps/Sets 2 sets of 10 repetitions   Cognition   Overall Cognitive Status [facility] Arousal/Participation Alert; Responsive   Attention Within functional limits   Orientation Level Oriented X4   Memory Within functional limits   Following Commands Follows all commands and directions without difficulty   Activity Tolerance   Activity Tolerance Patient limited by fatigue   Medical Staff Made Aware Co treatment session with Physical Therapy secondary to complex medical condition, requiring two skilled therapists to provide therapeutic techniques to position, facilitate and elicit functional mobility and activity engagement in occupational performance areas  Assessment   Assessment Patient participated in Skilled OT session this date with interventions consisting of therapeutic exercise to: increase functional use of BUEs, increase BUE muscle strength ,  therapeutic activities to: increase activity tolerance, increase cardiovascular endurance , increase dynamic sit/ stand balance during functional activity  and increase postural control   Patient agreeable to OT treatment session, upon arrival patient was found supine in bed  Patient requiring verbal cues for correct technique and verbal cues for pacing thru activity steps  Patient reported he has been sitting at edge of bed and sponge bathing with set up assist for UB , and mod assist for LB ADLs  patient did not want to attempt any self care tasks at this time, however agreeable to complete exercises/ bed mobility and sitting at edge of bed  Patient continues to be functioning below baseline level, occupational performance remains limited secondary to factors listed above and increased risk for falls and injury  From OT standpoint, recommendation at time of d/c would be Short Term Rehab  Patient to benefit from continued Occupational Therapy treatment while in the hospital to address deficits as defined above and maximize level of functional independence with ADLs and functional mobility     Plan   Treatment Interventions ADL retraining;Functional transfer training;UE strengthening/ROM; Endurance training;Patient/family training;Equipment evaluation/education; Compensatory technique education;Continued evaluation; Energy conservation; Activityengagement   Goal Expiration Date 03/18/23   OT Treatment Day 2   OT Frequency 3-5x/wk   Recommendation   OT Discharge Recommendation Post acute rehabilitation services   AM-PAC Daily Activity Inpatient   Lower Body Dressing 2   Bathing 2   Toileting 2   Upper Body Dressing 3   Grooming 3   Eating 4   Daily Activity Raw Score 16   Daily Activity Standardized Score (Calc for Raw Score >=11) 35 96   AM-PAC Applied Cognition Inpatient   Following a Speech/Presentation 4   Understanding Ordinary Conversation 4   Taking Medications 4   Remembering Where Things Are Placed or Put Away 4   Remembering List of 4-5 Errands 4   Taking Care of Complicated Tasks 4   Applied Cognition Raw Score 24   Applied Cognition Standardized Score 62 21

## 2023-03-11 LAB
GLUCOSE SERPL-MCNC: 114 MG/DL (ref 65–140)
GLUCOSE SERPL-MCNC: 122 MG/DL (ref 65–140)
GLUCOSE SERPL-MCNC: 145 MG/DL (ref 65–140)
GLUCOSE SERPL-MCNC: 177 MG/DL (ref 65–140)

## 2023-03-11 RX ADMIN — INSULIN LISPRO 10 UNITS: 100 INJECTION, SOLUTION INTRAVENOUS; SUBCUTANEOUS at 20:50

## 2023-03-11 RX ADMIN — DOCUSATE SODIUM 100 MG: 100 CAPSULE, LIQUID FILLED ORAL at 08:36

## 2023-03-11 RX ADMIN — ACETAMINOPHEN 975 MG: 325 TABLET, FILM COATED ORAL at 08:42

## 2023-03-11 RX ADMIN — TAMSULOSIN HYDROCHLORIDE 0.4 MG: 0.4 CAPSULE ORAL at 16:40

## 2023-03-11 RX ADMIN — ASPIRIN 81 MG 81 MG: 81 TABLET ORAL at 08:36

## 2023-03-11 RX ADMIN — VERAPAMIL HYDROCHLORIDE 360 MG: 180 TABLET ORAL at 08:36

## 2023-03-11 RX ADMIN — CLOPIDOGREL BISULFATE 75 MG: 75 TABLET ORAL at 08:37

## 2023-03-11 RX ADMIN — ESCITALOPRAM OXALATE 10 MG: 10 TABLET ORAL at 08:37

## 2023-03-11 RX ADMIN — LEVOTHYROXINE SODIUM 150 MCG: 150 TABLET ORAL at 08:37

## 2023-03-11 RX ADMIN — PANTOPRAZOLE SODIUM 40 MG: 40 TABLET, DELAYED RELEASE ORAL at 06:32

## 2023-03-11 RX ADMIN — INSULIN GLARGINE 25 UNITS: 100 INJECTION, SOLUTION SUBCUTANEOUS at 08:35

## 2023-03-11 RX ADMIN — INSULIN LISPRO 10 UNITS: 100 INJECTION, SOLUTION INTRAVENOUS; SUBCUTANEOUS at 13:05

## 2023-03-11 RX ADMIN — INSULIN GLARGINE 25 UNITS: 100 INJECTION, SOLUTION SUBCUTANEOUS at 22:45

## 2023-03-11 RX ADMIN — ATORVASTATIN CALCIUM 40 MG: 40 TABLET, FILM COATED ORAL at 16:40

## 2023-03-11 RX ADMIN — INSULIN LISPRO 10 UNITS: 100 INJECTION, SOLUTION INTRAVENOUS; SUBCUTANEOUS at 08:40

## 2023-03-11 NOTE — PROGRESS NOTES
[address]  Progress Note - Hailee Lunch 1965, 59 y o  male MRN: [id number]  Unit/Bed#: -01 Encounter: [id number]  Primary Care Provider: Sri Clement MD   Date and time admitted to hospital: 3/3/2023 11:45 AM    * MARKELL (acute kidney injury) [facility])  Assessment & Plan  · Likely pre renal in setting of hypovolemia while on home diuretics  · Now resolved with IV hydration  · Continue to hold home diuretics for now  · Renal US without evidence of obstruction  · Monitor creatinine     Constipation  Assessment & Plan  · No bowel movement in last 48 hours  · Rehab will not take patient until bowel movement  · Added milk of magnesia   · Now resolved     Physical deconditioning  Assessment & Plan  · Hx of fall and knee injury last month, unable to take care of self at home  · right tibial plateau comminuted fracture being treated conservatively by ortho outpatient   · PT/OT evaluation recommending rehab   · CM for placement  · Waiting for insurance authorization           VTE Pharmacologic Prophylaxis: VTE Score: 2 Low Risk (Score 0-2) - Encourage Ambulation  Patient Centered Rounds: I performed bedside rounds with nursing staff today  Discussions with Specialists or Other Care Team Provider: na    Total Time Spent on Date of Encounter in care of patient: 35 minutes This time was spent on one or more of the following: performing physical exam; counseling and coordination of care; obtaining or reviewing history; documenting in the medical record; reviewing/ordering tests, medications or procedures; communicating with other healthcare professionals and discussing with patient's family/caregivers      Current Length of Stay: 8 day(s)  Current Patient Status: Inpatient   Certification Statement: The patient will continue to require additional inpatient hospital stay due to placement, waiting for insurance authorization   Discharge Plan: Anticipate discharge in 24-48 hrs to rehab facility  Code Status: Level 1 - Full Code    Subjective:   Patient feels well today  No complaints     Objective:     Vitals:   Temp (24hrs), Av 1 °F (36 7 °C), Min:98 °F (36 7 °C), Max:98 2 °F (36 8 °C)    Temp:  [98 °F (36 7 °C)-98 2 °F (36 8 °C)] 98 2 °F (36 8 °C)  Resp:  [17] 17  BP: ()/(57-81) 127/81  Body mass index is 33 62 kg/m²  Input and Output Summary (last 24 hours): Intake/Output Summary (Last 24 hours) at 3/11/2023 0911  Last data filed at 3/10/2023 1751  Gross per 24 hour   Intake 100 ml   Output --   Net 100 ml       Physical Exam:   Physical Exam  Constitutional:       General: He is not in acute distress  Appearance: Normal appearance  He is not toxic-appearing  Cardiovascular:      Rate and Rhythm: Normal rate and regular rhythm  Heart sounds: Normal heart sounds  No murmur heard  Pulmonary:      Effort: Pulmonary effort is normal  No respiratory distress  Breath sounds: Normal breath sounds  No wheezing  Abdominal:      General: Abdomen is flat  There is no distension  Palpations: Abdomen is soft  Tenderness: There is no abdominal tenderness  Neurological:      General: No focal deficit present  Mental Status: He is alert and oriented to person, place, and time  Mental status is at baseline  Motor: No weakness            Additional Data:     Labs:  Results from last 7 days   Lab Units 23  0542   WBC Thousand/uL 4 80   HEMOGLOBIN g/dL 10 3*   HEMATOCRIT % 30 2*   PLATELETS Thousands/uL 207     Results from last 7 days   Lab Units 23  0542   SODIUM mmol/L 135   POTASSIUM mmol/L 3 7   CHLORIDE mmol/L 101   CO2 mmol/L 26   BUN mg/dL 16   CREATININE mg/dL 0 92   ANION GAP mmol/L 8   CALCIUM mg/dL 8 9   GLUCOSE RANDOM mg/dL 114         Results from last 7 days   Lab Units 23  0719 03/10/23  2120 03/10/23  1606 03/10/23  1103 03/10/23  0720 23  2113 23  1608 23  1127 23  0748 23 03/08/23  1616 03/08/23  1053   POC GLUCOSE mg/dl 122 109 105 171* 89 92 104 134 122 182* 160* 170*               Lines/Drains:  Invasive Devices     None                       Imaging: No pertinent imaging reviewed  Recent Cultures (last 7 days):   Results from last 7 days   Lab Units 03/04/23  1320   C DIFF TOXIN B BY PCR  Negative       Last 24 Hours Medication List:   Current Facility-Administered Medications   Medication Dose Route Frequency Provider Last Rate   • acetaminophen  975 mg Oral Q8H PRN Favio Leavitt MD     • aspirin  81 mg Oral Daily Sol Harkins MD     • atorvastatin  40 mg Oral Daily With Nathalia Harkins MD     • clopidogrel  75 mg Oral Daily Thais Hilario MD     • docusate sodium  100 mg Oral BID Malissa Pat MD     • escitalopram  10 mg Oral Daily Thais Hilario MD     • hydrOXYzine HCL  25 mg Oral Q6H PRN Thais Hilario MD     • insulin glargine  25 Units Subcutaneous Q12H [facility] Favio Leavitt MD     • insulin lispro  1-6 Units Subcutaneous 4x Daily (AC & HS) Favio Leavitt MD     • insulin lispro  10 Units Subcutaneous TID With Meals Favio Leavitt MD     • levothyroxine  150 mcg Oral Daily Thais Hilario MD     • mirtazapine  7 5 mg Oral HS Thais Hilario MD     • ondansetron  4 mg Intravenous Q6H PRN Thais Hilario MD     • pantoprazole  40 mg Oral Early Morning Worthy Lino Harkins MD     • senna  1 tablet Oral HS Malissa Pat MD     • tamsulosin  0 4 mg Oral Daily With Fredi Mitchell MD     • traMADol  50 mg Oral Q6H PRN Favio Leavitt MD     • verapamil  360 mg Oral Daily Thais Hilario MD     • zolpidem  10 mg Oral HS PRN Fernando Comer PA-C          Today, Patient Was Seen By: Richar Amaya MD    **Please Note: This note may have been constructed using a voice recognition system  **

## 2023-03-11 NOTE — ASSESSMENT & PLAN NOTE
· Hx of fall and knee injury last month, unable to take care of self at home  · right tibial plateau comminuted fracture being treated conservatively by ortho outpatient   · PT/OT evaluation recommending rehab   · CM for placement  · Waiting for insurance authorization

## 2023-03-11 NOTE — PLAN OF CARE
Problem: INFECTION - ADULT  Goal: Absence or prevention of progression during hospitalization  Description: INTERVENTIONS:  - Assess and monitor for signs and symptoms of infection  - Monitor lab/diagnostic results  - Monitor all insertion sites, i e  indwelling lines, tubes, and drains  - Monitor endotracheal if appropriate and nasal secretions for changes in amount and color  - [place] appropriate cooling/warming therapies per order  - Administer medications as ordered  - Instruct and encourage patient and family to use good hand hygiene technique  - Identify and instruct in appropriate isolation precautions for identified infection/condition  Outcome: Progressing

## 2023-03-12 PROBLEM — K59.00 CONSTIPATION: Status: RESOLVED | Noted: 2023-03-09 | Resolved: 2023-03-12

## 2023-03-12 LAB
GLUCOSE SERPL-MCNC: 113 MG/DL (ref 65–140)
GLUCOSE SERPL-MCNC: 156 MG/DL (ref 65–140)
GLUCOSE SERPL-MCNC: 165 MG/DL (ref 65–140)
GLUCOSE SERPL-MCNC: 98 MG/DL (ref 65–140)

## 2023-03-12 RX ADMIN — SENNOSIDES 8.6 MG: 8.6 TABLET, FILM COATED ORAL at 22:56

## 2023-03-12 RX ADMIN — ACETAMINOPHEN 975 MG: 325 TABLET, FILM COATED ORAL at 23:01

## 2023-03-12 RX ADMIN — TAMSULOSIN HYDROCHLORIDE 0.4 MG: 0.4 CAPSULE ORAL at 16:49

## 2023-03-12 RX ADMIN — CLOPIDOGREL BISULFATE 75 MG: 75 TABLET ORAL at 10:53

## 2023-03-12 RX ADMIN — INSULIN GLARGINE 25 UNITS: 100 INJECTION, SOLUTION SUBCUTANEOUS at 22:54

## 2023-03-12 RX ADMIN — INSULIN LISPRO 1 UNITS: 100 INJECTION, SOLUTION INTRAVENOUS; SUBCUTANEOUS at 12:16

## 2023-03-12 RX ADMIN — INSULIN LISPRO 1 UNITS: 100 INJECTION, SOLUTION INTRAVENOUS; SUBCUTANEOUS at 22:56

## 2023-03-12 RX ADMIN — LEVOTHYROXINE SODIUM 150 MCG: 150 TABLET ORAL at 10:59

## 2023-03-12 RX ADMIN — ACETAMINOPHEN 975 MG: 325 TABLET, FILM COATED ORAL at 00:20

## 2023-03-12 RX ADMIN — PANTOPRAZOLE SODIUM 40 MG: 40 TABLET, DELAYED RELEASE ORAL at 05:20

## 2023-03-12 RX ADMIN — VERAPAMIL HYDROCHLORIDE 360 MG: 180 TABLET ORAL at 10:53

## 2023-03-12 RX ADMIN — INSULIN LISPRO 10 UNITS: 100 INJECTION, SOLUTION INTRAVENOUS; SUBCUTANEOUS at 12:16

## 2023-03-12 RX ADMIN — ZOLPIDEM TARTRATE 10 MG: 5 TABLET, COATED ORAL at 23:01

## 2023-03-12 RX ADMIN — INSULIN GLARGINE 25 UNITS: 100 INJECTION, SOLUTION SUBCUTANEOUS at 12:16

## 2023-03-12 RX ADMIN — ATORVASTATIN CALCIUM 40 MG: 40 TABLET, FILM COATED ORAL at 16:49

## 2023-03-12 RX ADMIN — ASPIRIN 81 MG 81 MG: 81 TABLET ORAL at 10:59

## 2023-03-12 NOTE — PROGRESS NOTES
[address] [facility]  Progress Note - Cherylene Neat 1965, 59 y o  male MRN: [id number]  Unit/Bed#: -01 Encounter: [id number]  Primary Care Provider: Jesica Krishna MD   Date and time admitted to hospital: 3/3/2023 11:45 AM    * MARKELL (acute kidney injury) [facility])  Assessment & Plan  · Likely pre renal in setting of hypovolemia while on home diuretics  · Now resolved with IV hydration  · Continue to hold home diuretics for now  · Renal US without evidence of obstruction  · Monitor creatinine     Hypertension, essential  Assessment & Plan  · Currently adequately controlled off meds  · Continue to hold    Constipation-resolved as of 3/12/2023  Assessment & Plan  · No bowel movement in last 48 hours  · Rehab will not take patient until bowel movement  · Added milk of magnesia   · Now resolved         VTE Pharmacologic Prophylaxis: VTE Score: 2 Low Risk (Score 0-2) - Encourage Ambulation  Patient Centered Rounds: I performed bedside rounds with nursing staff today  Discussions with Specialists or Other Care Team Provider: na      Total Time Spent on Date of Encounter in care of patient: 35 minutes This time was spent on one or more of the following: performing physical exam; counseling and coordination of care; obtaining or reviewing history; documenting in the medical record; reviewing/ordering tests, medications or procedures; communicating with other healthcare professionals and discussing with patient's family/caregivers  Current Length of Stay: 9 day(s)  Current Patient Status: Inpatient   Certification Statement: The patient will continue to require additional inpatient hospital stay due to placement, waiting for insurance auth  Discharge Plan: Anticipate discharge tomorrow to home      Code Status: Level 1 - Full Code    Subjective:   Patient feels well today     Objective:     Vitals:   Temp (24hrs), Av 3 °F (36 8 °C), Min:97 8 °F (36 6 °C), Max:98 7 °F (37 1 °C)    Temp:  [97 8 °F (36 6 °C)-98 7 °F (37 1 °C)] 98 7 °F (37 1 °C)  HR:  [80] 80  Resp:  [18] 18  BP: (100-129)/(58-80) 129/80  SpO2:  [99 %] 99 %  Body mass index is 33 62 kg/m²  Input and Output Summary (last 24 hours): Intake/Output Summary (Last 24 hours) at 3/12/2023 0948  Last data filed at 3/12/2023 0501  Gross per 24 hour   Intake 240 ml   Output 1200 ml   Net -960 ml       Physical Exam:   Physical Exam  Constitutional:       General: He is not in acute distress  Appearance: Normal appearance  He is not toxic-appearing  Cardiovascular:      Rate and Rhythm: Normal rate and regular rhythm  Heart sounds: Normal heart sounds  No murmur heard  Pulmonary:      Effort: Pulmonary effort is normal  No respiratory distress  Breath sounds: Normal breath sounds  No wheezing  Abdominal:      General: Abdomen is flat  There is no distension  Palpations: Abdomen is soft  Tenderness: There is no abdominal tenderness  Neurological:      General: No focal deficit present  Mental Status: He is alert and oriented to person, place, and time  Mental status is at baseline  Motor: No weakness            Additional Data:     Labs:  Results from last 7 days   Lab Units 03/07/23  0542   WBC [place]/uL 4 80   HEMOGLOBIN g/dL 10 3*   HEMATOCRIT % 30 2*   PLATELETS [place]/uL 207     Results from last 7 days   Lab Units 03/07/23  0542   SODIUM mmol/L 135   POTASSIUM mmol/L 3 7   CHLORIDE mmol/L 101   CO2 mmol/L 26   BUN mg/dL 16   CREATININE mg/dL 0 92   ANION GAP mmol/L 8   CALCIUM mg/dL 8 9   GLUCOSE RANDOM mg/dL 114         Results from last 7 days   Lab Units 03/12/23  0800 03/11/23  2107 03/11/23  1606 03/11/23  1106 03/11/23  0719 03/10/23  2120 03/10/23  1606 03/10/23  1103 03/10/23  0720 03/09/23  2113 03/09/23  1608 03/09/23  1127   POC GLUCOSE mg/dl 113 177* 145* 114 122 109 105 171* 89 92 104 134               Lines/Drains:  Invasive Devices     None                       Imaging: No pertinent imaging reviewed  Recent Cultures (last 7 days):         Last 24 Hours Medication List:   Current Facility-Administered Medications   Medication Dose Route Frequency Provider Last Rate   • acetaminophen  975 mg Oral Q8H PRN Mauri Hughes MD     • aspirin  81 mg Oral Daily Keila Marinelli MD     • atorvastatin  40 mg Oral Daily With Nathalia Marinelli MD     • clopidogrel  75 mg Oral Daily Nidia Harada, MD     • docusate sodium  100 mg Oral BID Anshul Hubbard MD     • escitalopram  10 mg Oral Daily Nidia Harada, MD     • hydrOXYzine HCL  25 mg Oral Q6H PRN Nidia Harada, MD     • insulin glargine  25 Units Subcutaneous Q12H [address] Mauri Hughes MD     • insulin lispro  1-6 Units Subcutaneous 4x Daily (AC & HS) Mauri Hughes MD     • insulin lispro  10 Units Subcutaneous TID With Meals Mauri Hughes MD     • levothyroxine  150 mcg Oral Daily Nidia Harada, MD     • mirtazapine  7 5 mg Oral HS Nidia Harada, MD     • ondansetron  4 mg Intravenous Q6H PRN Nidia Harada, MD     • pantoprazole  40 mg Oral Early Morning Keila Marinelli MD     • senna  1 tablet Oral HS Anshul Hubbard MD     • tamsulosin  0 4 mg Oral Daily With Idris Whitehead MD     • traMADol  50 mg Oral Q6H PRN Mauri Hughes MD     • verapamil  360 mg Oral Daily Nidia Harada, MD     • zolpidem  10 mg Oral HS PRN Samantha Merritt PA-C          Today, Patient Was Seen By: Miladys Geronimo MD    **Please Note: This note may have been constructed using a voice recognition system  **

## 2023-03-13 VITALS
HEIGHT: 75 IN | DIASTOLIC BLOOD PRESSURE: 77 MMHG | SYSTOLIC BLOOD PRESSURE: 121 MMHG | OXYGEN SATURATION: 99 % | RESPIRATION RATE: 18 BRPM | BODY MASS INDEX: 33.44 KG/M2 | WEIGHT: 268.96 LBS | HEART RATE: 88 BPM | TEMPERATURE: 98 F

## 2023-03-13 LAB
GLUCOSE SERPL-MCNC: 139 MG/DL (ref 65–140)
GLUCOSE SERPL-MCNC: 89 MG/DL (ref 65–140)

## 2023-03-13 RX ORDER — ZOLPIDEM TARTRATE 10 MG/1
10 TABLET ORAL
Qty: 10 TABLET | Refills: 0 | Status: SHIPPED | OUTPATIENT
Start: 2023-03-13 | End: 2023-03-18 | Stop reason: SDUPTHER

## 2023-03-13 RX ORDER — ESCITALOPRAM OXALATE 10 MG/1
10 TABLET ORAL DAILY
Qty: 30 TABLET | Refills: 0 | Status: SHIPPED | OUTPATIENT
Start: 2023-03-14 | End: 2023-04-13

## 2023-03-13 RX ORDER — TRAMADOL HYDROCHLORIDE 50 MG/1
50 TABLET ORAL EVERY 6 HOURS PRN
Qty: 40 TABLET | Refills: 0 | Status: SHIPPED | OUTPATIENT
Start: 2023-03-13 | End: 2023-03-23

## 2023-03-13 RX ORDER — MIRTAZAPINE 7.5 MG/1
7.5 TABLET, FILM COATED ORAL
Qty: 30 TABLET | Refills: 0 | Status: SHIPPED | OUTPATIENT
Start: 2023-03-13 | End: 2023-04-12

## 2023-03-13 RX ORDER — TAMSULOSIN HYDROCHLORIDE 0.4 MG/1
0.4 CAPSULE ORAL
Qty: 30 CAPSULE | Refills: 0 | Status: SHIPPED | OUTPATIENT
Start: 2023-03-13 | End: 2023-04-12

## 2023-03-13 RX ADMIN — CLOPIDOGREL BISULFATE 75 MG: 75 TABLET ORAL at 08:21

## 2023-03-13 RX ADMIN — PANTOPRAZOLE SODIUM 40 MG: 40 TABLET, DELAYED RELEASE ORAL at 06:22

## 2023-03-13 RX ADMIN — DOCUSATE SODIUM 100 MG: 100 CAPSULE, LIQUID FILLED ORAL at 08:21

## 2023-03-13 RX ADMIN — LEVOTHYROXINE SODIUM 150 MCG: 150 TABLET ORAL at 08:21

## 2023-03-13 RX ADMIN — VERAPAMIL HYDROCHLORIDE 360 MG: 180 TABLET ORAL at 08:21

## 2023-03-13 RX ADMIN — ASPIRIN 81 MG 81 MG: 81 TABLET ORAL at 08:21

## 2023-03-13 RX ADMIN — INSULIN GLARGINE 25 UNITS: 100 INJECTION, SOLUTION SUBCUTANEOUS at 08:23

## 2023-03-13 NOTE — CASE MANAGEMENT
Case Management Discharge Planning Note    Patient name Leilani Briggs  Location [address] 332/-01 MRN [id number]  : 1965 Date 3/13/2023       Current Admission Date: 3/3/2023  Current Admission Diagnosis:MARKELL (acute kidney injury) [facility])   Patient Active Problem List    Diagnosis Date Noted   • Failure to thrive in adult 2023   • Depression and anxiety 2023   • Acquired genu varum of right lower extremity 2023   • MARKELL (acute kidney injury) ([facility] ) 2023   • Chronic kidney disease-mineral and bone disorder 2023   • Closed fracture of right tibial plateau    • Stage 3b chronic kidney disease ([facility] ) 11/15/2022   • Hyperkalemia 11/15/2022   • Alcoholism ([facility] ) 11/15/2022   • Dupuytren's contracture of right hand 2022   • Type 2 diabetes mellitus with diabetic peripheral angiopathy without gangrene ([facility] ) 2022   • Physical deconditioning 2021   • Acquired hypothyroidism 10/18/2021   • Acquired absence of right foot ([facility] ) 2021   • Persistent proteinuria 2021   • Abnormal EKG 2020   • Insomnia 2020   • Anemia 10/08/2020   • Hypomagnesemia 2020   • Urinary retention 2020   • Thyroid cancer ([facility] ) 2020   • PAD (peripheral artery disease) ([facility] ) 2020   • Health maintenance examination 2020   • Elevated liver enzymes 10/24/2019   • Colon polyps 2019   • Right-sided tinnitus 2018   • Hypertension, essential 2018   • Gastroesophageal reflux disease without esophagitis 2018   • Obesity, Class I, BMI 30 0-34 9 (see actual BMI) 2010   • Hyperlipidemia 2009   • History of nonadherence to medical treatment 2008      LOS (days): 10  Geometric Mean LOS (GMLOS) (days): 3 40  Days to GMLOS:-6 3     OBJECTIVE:  Risk of Unplanned Readmission Score: 18 35         Current admission status: Inpatient   Preferred Pharmacy:   [address] [place] [address] [id number] KATE Caicedo  [address] [facility] [address] [facility] [address]  Phone: [phone] Fax: [phone] 2729 [address], [address] [place] [address]  Phone: [phone] Fax: [phone]    Primary Care Provider: Marilin Reyes MD    Primary Insurance: [address] [place] [address] Ave:     [address] [facility] Number: [id number]

## 2023-03-13 NOTE — PLAN OF CARE
Problem: Potential for Falls  Goal: Patient will remain free of falls  Description: INTERVENTIONS:  - Educate patient/family on patient safety including physical limitations  - Instruct patient to call for assistance with activity   - Consult OT/PT to assist with strengthening/mobility   - Keep Call bell within reach  - Keep bed low and locked with side rails adjusted as appropriate  - Keep care items and personal belongings within reach  - Initiate and maintain comfort rounds  - Make Fall Risk Sign visible to staff  - Apply yellow socks and bracelet for high fall risk patients  - Consider moving patient to room near nurses station  3/13/2023 1244 by Corin Gomez RN  Outcome: Adequate for Discharge  3/13/2023 1128 by Corin Gomez RN  Outcome: Progressing     Problem: PAIN - ADULT  Goal: Verbalizes/displays adequate comfort level or baseline comfort level  Description: Interventions:  - Encourage patient to monitor pain and request assistance  - Assess pain using appropriate pain scale  - Administer analgesics based on type and severity of pain and evaluate response  - Implement non-pharmacological measures as appropriate and evaluate response  - Consider cultural and social influences on pain and pain management  - Notify physician/advanced practitioner if interventions unsuccessful or patient reports new pain  3/13/2023 1244 by Corin Gomez RN  Outcome: Adequate for Discharge  3/13/2023 1128 by Corin Gomez RN  Outcome: Progressing     Problem: INFECTION - ADULT  Goal: Absence or prevention of progression during hospitalization  Description: INTERVENTIONS:  - Assess and monitor for signs and symptoms of infection  - Monitor lab/diagnostic results  - Monitor all insertion sites, i e  indwelling lines, tubes, and drains  - Monitor endotracheal if appropriate and nasal secretions for changes in amount and color  - [place] appropriate cooling/warming therapies per order  - Administer medications as ordered  - Instruct and encourage patient and family to use good hand hygiene technique  - Identify and instruct in appropriate isolation precautions for identified infection/condition  3/13/2023 1244 by Cindi Dudley RN  Outcome: Adequate for Discharge  3/13/2023 1128 by Cindi Dudley RN  Outcome: Progressing  Goal: Absence of fever/infection during neutropenic period  Description: INTERVENTIONS:  - Monitor WBC    3/13/2023 1244 by Cindi Dudley RN  Outcome: Adequate for Discharge  3/13/2023 1128 by Cindi Dudley RN  Outcome: Progressing     Problem: SAFETY ADULT  Goal: Patient will remain free of falls  Description: INTERVENTIONS:  - Educate patient/family on patient safety including physical limitations  - Instruct patient to call for assistance with activity   - Consult OT/PT to assist with strengthening/mobility   - Keep Call bell within reach  - Keep bed low and locked with side rails adjusted as appropriate  - Keep care items and personal belongings within reach  - Initiate and maintain comfort rounds  - Make Fall Risk Sign visible to staff  - Apply yellow socks and bracelet for high fall risk patients  - Consider moving patient to room near nurses station  3/13/2023 1244 by Cindi Dudley RN  Outcome: Adequate for Discharge  3/13/2023 1128 by Cindi Dudley RN  Outcome: Progressing  Goal: Maintain or return to baseline ADL function  Description: INTERVENTIONS:  -  Assess patient's ability to carry out ADLs; assess patient's baseline for ADL function and identify physical deficits which impact ability to perform ADLs (bathing, care of mouth/teeth, toileting, grooming, dressing, etc )  - Assess/evaluate cause of self-care deficits   - Assess range of motion  - Assess patient's mobility; develop plan if impaired  - Assess patient's need for assistive devices and provide as appropriate  - Encourage maximum independence but intervene and supervise when necessary  - Involve family in performance of ADLs  - Assess for home care needs following discharge   - Consider OT consult to assist with ADL evaluation and planning for discharge  - Provide patient education as appropriate  3/13/2023 1244 by Suzanne Samano RN  Outcome: Adequate for Discharge  3/13/2023 1128 by Suzanne Samano RN  Outcome: Progressing  Goal: Maintains/Returns to pre admission functional level  Description: INTERVENTIONS:  - Perform BMAT or MOVE assessment daily    - Set and communicate daily mobility goal to care team and patient/family/caregiver  - Collaborate with rehabilitation services on mobility goals if consulted  - Out of bed for toileting  - Record patient progress and toleration of activity level   3/13/2023 1244 by Suzanne Samano RN  Outcome: Adequate for Discharge  3/13/2023 1128 by Suzanne Samano RN  Outcome: Progressing     Problem: DISCHARGE PLANNING  Goal: Discharge to home or other facility with appropriate resources  Description: INTERVENTIONS:  - Identify barriers to discharge w/patient and caregiver  - Arrange for needed discharge resources and transportation as appropriate  - Identify discharge learning needs (meds, wound care, etc )  - Arrange for interpretive services to assist at discharge as needed  - Refer to Case Management Department for coordinating discharge planning if the patient needs post-hospital services based on physician/advanced practitioner order or complex needs related to functional status, cognitive ability, or social support system  3/13/2023 1244 by Suzanne Samano RN  Outcome: Adequate for Discharge  3/13/2023 1128 by Suzanne Samano RN  Outcome: Progressing     Problem: Knowledge Deficit  Goal: Patient/family/caregiver demonstrates understanding of disease process, treatment plan, medications, and discharge instructions  Description: Complete learning assessment and assess knowledge base    Interventions:  - Provide teaching at level of understanding  - Provide teaching via preferred learning methods  3/13/2023 1244 by Turner Gowers, RN  Outcome: Adequate for Discharge  3/13/2023 1128 by Turner Gowers, RN  Outcome: Progressing     Problem: Nutrition/Hydration-ADULT  Goal: Nutrient/Hydration intake appropriate for improving, restoring or maintaining nutritional needs  Description: Monitor and assess patient's nutrition/hydration status for malnutrition  Collaborate with interdisciplinary team and initiate plan and interventions as ordered  Monitor patient's weight and dietary intake as ordered or per policy  Utilize nutrition screening tool and intervene as necessary  Determine patient's food preferences and provide high-protein, high-caloric foods as appropriate       INTERVENTIONS:  - Monitor oral intake, urinary output, labs, and treatment plans  - Assess nutrition and hydration status and recommend course of action  - Evaluate amount of meals eaten  - Assist patient with eating if necessary   - Allow adequate time for meals  - Recommend/ encourage appropriate diets, oral nutritional supplements, and vitamin/mineral supplements  - Order, calculate, and assess calorie counts as needed  - Recommend, monitor, and adjust tube feedings and TPN/PPN based on assessed needs  - Assess need for intravenous fluids  - Provide specific nutrition/hydration education as appropriate  - Include patient/family/caregiver in decisions related to nutrition  3/13/2023 1244 by Turner Gowers, RN  Outcome: Adequate for Discharge  3/13/2023 1128 by Turner Gowers, RN  Outcome: Progressing     Problem: MOBILITY - ADULT  Goal: Maintain or return to baseline ADL function  Description: INTERVENTIONS:  -  Assess patient's ability to carry out ADLs; assess patient's baseline for ADL function and identify physical deficits which impact ability to perform ADLs (bathing, care of mouth/teeth, toileting, grooming, dressing, etc )  - Assess/evaluate cause of self-care deficits   - Assess range of motion  - Assess patient's mobility; develop plan if impaired  - Assess patient's need for assistive devices and provide as appropriate  - Encourage maximum independence but intervene and supervise when necessary  - Involve family in performance of ADLs  - Assess for home care needs following discharge   - Consider OT consult to assist with ADL evaluation and planning for discharge  - Provide patient education as appropriate  3/13/2023 1244 by Ivey Mohs, RN  Outcome: Adequate for Discharge  3/13/2023 1128 by Ivey Mohs, RN  Outcome: Progressing  Goal: Maintains/Returns to pre admission functional level  Description: INTERVENTIONS:  - Perform BMAT or MOVE assessment daily    - Set and communicate daily mobility goal to care team and patient/family/caregiver     - Collaborate with rehabilitation services on mobility goals if consulted  - Out of bed for toileting  - Record patient progress and toleration of activity level   3/13/2023 1244 by Ivey Mohs, RN  Outcome: Adequate for Discharge  3/13/2023 1128 by Ivey Mohs, RN  Outcome: Progressing     Problem: Prexisting or High Potential for Compromised Skin Integrity  Goal: Skin integrity is maintained or improved  Description: INTERVENTIONS:  - Identify patients at risk for skin breakdown  - Assess and monitor skin integrity  - Assess and monitor nutrition and hydration status  - Monitor labs   - Assess for incontinence   - Turn and reposition patient  - Assist with mobility/ambulation  - Relieve pressure over bony prominences  - Avoid friction and shearing  - Provide appropriate hygiene as needed including keeping skin clean and dry  - Evaluate need for skin moisturizer/barrier cream  - Collaborate with interdisciplinary team   - Patient/family teaching  - Consider wound care consult   3/13/2023 1244 by Ivey Mohs, RN  Outcome: Adequate for Discharge  3/13/2023 1128 by Ivey Mohs, RN  Outcome: Progressing

## 2023-03-13 NOTE — CASE MANAGEMENT
Case Management Discharge Planning Note    Patient name Franck Landaverde  Location [address] 332/-01 MRN [id number]  : 1965 Date 3/13/2023       Current Admission Date: 3/3/2023  Current Admission Diagnosis:MARKELL (acute kidney injury) [facility])   Patient Active Problem List    Diagnosis Date Noted   • Failure to thrive in adult 2023   • Depression and anxiety 2023   • Acquired genu varum of right lower extremity 2023   • MARKELL (acute kidney injury) ([facility] [address] ) 2023   • Chronic kidney disease-mineral and bone disorder 2023   • Closed fracture of right tibial plateau    • Stage 3b chronic kidney disease ([facility] ) 11/15/2022   • Hyperkalemia 11/15/2022   • Alcoholism ([facility] ) 11/15/2022   • Dupuytren's contracture of right hand 2022   • Type 2 diabetes mellitus with diabetic peripheral angiopathy without gangrene ([facility] ) 2022   • Physical deconditioning 2021   • Acquired hypothyroidism 10/18/2021   • Acquired absence of right foot ([facility] ) 2021   • Persistent proteinuria 2021   • Abnormal EKG 2020   • Insomnia 2020   • Anemia 10/08/2020   • Hypomagnesemia 2020   • Urinary retention 2020   • Thyroid cancer ([facility] ) 2020   • PAD (peripheral artery disease) ([facility] ) 2020   • Health maintenance examination 2020   • Elevated liver enzymes 10/24/2019   • Colon polyps 2019   • Right-sided tinnitus 2018   • Hypertension, essential 2018   • Gastroesophageal reflux disease without esophagitis 2018   • Obesity, Class I, BMI 30 0-34 9 (see actual BMI) 2010   • Hyperlipidemia 2009   • History of nonadherence to medical treatment 2008      LOS (days): 10  Geometric Mean LOS (GMLOS) (days): 3 40  Days to GMLOS:-6 3     OBJECTIVE:  Risk of Unplanned Readmission Score: 18 35         Current admission status: Inpatient   Preferred Pharmacy:   [address] [place] [address] #[id number] [place], [place] - [address] [facility] [address] [facility] [address]  Phone: [phone] Fax: [phone] [address] [place], [address] [place] [address]  Phone: [phone] Fax: [phone]    Primary Care Provider: Yonathan Agarwal MD    Primary Insurance: BLUE CROSS  Secondary Insurance:     DISCHARGE DETAILS:                                          Other Referral/Resources/Interventions Provided:  Referral Comments: Insurance Hetal Persaud has been obtained for pt to [facility]    Awaiting transport time         Treatment Team Recommendation: Short Term Rehab  Discharge Destination Plan[de-identified] Short Term Rehab  Transport at Discharge : S Ambulance  Dispatcher Contacted: Yes        ETA of Transport (Date): 03/13/23                               Accepting Facility Name, [facility]  Receiving Facility/Agency Phone Number: [phone] Mike Wang  Facility/Agency Fax Number: [phone]

## 2023-03-13 NOTE — CASE MANAGEMENT
[address] 48 has received approved authorization from insurance:     Called in by Rep: Sheryle Buffalo  P# [phone] opt  2  Authorization received for: [facility]  Facility: [facility] #: [id number]  Start of Care: 03/13/2023  Next Review Date: 03/15/2023  Care Coordinator: Wing [place] SEN#: [phone]  Submit next review to: Fax   [phone]  Care Manager notified: Tona Lee

## 2023-03-13 NOTE — CASE MANAGEMENT
Case Management Discharge Planning Note    Patient name Brit Herrera  Location [address] [id number] MRN [id number]  : 1965 Date 3/13/2023       Current Admission Date: 3/3/2023  Current Admission Diagnosis:MARKELL (acute kidney injury) [facility])   Patient Active Problem List    Diagnosis Date Noted   • Failure to thrive in adult 2023   • Depression and anxiety 2023   • Acquired genu varum of right lower extremity 2023   • MARKELL (acute kidney injury) ([facility] ) 2023   • Chronic kidney disease-mineral and bone disorder 2023   • Closed fracture of right tibial plateau 15/86/4459   • Stage 3b chronic kidney disease ([facility] ) 11/15/2022   • Hyperkalemia 11/15/2022   • Alcoholism ([facility] ) 11/15/2022   • Dupuytren's contracture of right hand 2022   • Type 2 diabetes mellitus with diabetic peripheral angiopathy without gangrene ([facility] ) 2022   • Physical deconditioning 2021   • Acquired hypothyroidism 10/18/2021   • Acquired absence of right foot ([facility] ) 2021   • Persistent proteinuria 2021   • Abnormal EKG 2020   • Insomnia 2020   • Anemia 10/08/2020   • Hypomagnesemia 2020   • Urinary retention 2020   • Thyroid cancer ([facility] ) 2020   • PAD (peripheral artery disease) ([facility] ) 2020   • Health maintenance examination 2020   • Elevated liver enzymes 10/24/2019   • Colon polyps 2019   • Right-sided tinnitus 2018   • Hypertension, essential 2018   • Gastroesophageal reflux disease without esophagitis 2018   • Obesity, Class I, BMI 30 0-34 9 (see actual BMI) 2010   • Hyperlipidemia 2009   • History of nonadherence to medical treatment 2008      LOS (days): 10  Geometric Mean LOS (GMLOS) (days): 3 40  Days to GMLOS:-6 4     OBJECTIVE:  Risk of Unplanned Readmission Score: 18 4         Current admission status: Inpatient   Preferred Pharmacy:   [facility] #[id number] KATE Felix - [address] [facility] [address] [facility] [address] [place] [address]  Phone: [phone] Fax: [phone] [address] [place], [address] [place] [address]  Phone: [phone] Fax: [phone]    Primary Care Provider: Lisa Hayes MD    Primary Insurance: BLUE CROSS  Secondary Insurance:     DISCHARGE DETAILS:    Discharge planning discussed with[de-identified] patient and friend Calixto Ort of Choice: Yes  Comments - Freedom of Choice: Pt to be discharged to [facility] today with transport arranged for 14:00  Pt is in agreement with this dcp  Med Nec placed in chart  CM contacted family/caregiver?: Yes  Were Treatment Team discharge recommendations reviewed with patient/caregiver?: Yes  Did patient/caregiver verbalize understanding of patient care needs?: Yes  Were patient/caregiver advised of the risks associated with not following Treatment Team discharge recommendations?: Yes    Contacts  Patient Contacts: Dottie  Relationship to Patient[de-identified] [address]          Is the [place] interested [place] [address] at discharge?: No    DME Referral Provided  Referral made for DME?: No    Other Referral/Resources/Interventions Provided:  Interventions: Short Term Rehab  Referral Comments: [facility]-transport arranged for 14:00    Nursing, pt and friend Mariela Jimenez informed of transport time    Would you like to participate in our [address] service program?  : No - Declined    Treatment Team Recommendation: Short Term Rehab  Discharge Destination Plan[de-identified] Short Term Rehab  Transport at Discharge : [facility]  Dispatcher Contacted: Yes     Transported by [employer] and Unit #): [address]  ETA of Transport (Date): 03/13/23  ETA of Transport (Time): 1400

## 2023-03-14 DIAGNOSIS — Z71.89 COMPLEX CARE COORDINATION: Primary | ICD-10-CM

## 2023-03-14 NOTE — UTILIZATION REVIEW
NOTIFICATION OF ADMISSION DISCHARGE   This is a Notification of Discharge from [address]  Please be advised that this patient has been discharge from our facility  Below you will find the admission and discharge date and time including the patient’s disposition  UTILIZATION REVIEW CONTACT:  Compa Steele  Utilization   Network Utilization Review Department  Phone: [phone] x carefully listen to the prompts  All voicemails are confidential   Email: [email or web address]     ADMISSION INFORMATION  PRESENTATION DATE: 3/3/2023 11:45 AM  OBERVATION ADMISSION DATE:   INPATIENT ADMISSION DATE: 3/3/23  6:02 PM   DISCHARGE DATE: 3/13/2023  2:18 PM   DISPOSITION:Non [facility] SNF/TCU/SNU    IMPORTANT INFORMATION:  Send all requests for admission clinical reviews, approved or denied determinations and any other requests to dedicated fax number below belonging to the campus where the patient is receiving treatment   List of dedicated fax numbers:  [address] DENIALS (Administrative/Medical Necessity) [phone]   [address] (Maternity/NICU/Pediatrics) [phone]   [facility] [phone]   [place] [address] [phone]   [address] [phone]   220 [facility] [phone]   90 [facility] [phone]   [address] [place] [address] [phone]   [facility]  [phone]   4059 [facility] [phone]   412 [facility] [address] [facility] [phone]

## 2023-03-16 ENCOUNTER — DOCUMENTATION (OUTPATIENT)
Dept: OBGYN CLINIC | Facility: HOSPITAL | Age: 58
End: 2023-03-16

## 2023-03-16 NOTE — PROGRESS NOTES
DR Joellen Del Valle, rehab physician  Needs someone to call her back about pt's right knee  What is Dr Keisha Bell plan  It has been 7 weeks  He is not able to make a trip to Dr Keisha Bell he is not weighbearing as of yet and [facility] are [facility] Number for Dr Eric Hauser to call Dr Joellen Del Valle  Pt is [facility]

## 2023-03-17 ENCOUNTER — PATIENT OUTREACH (OUTPATIENT)
Dept: FAMILY MEDICINE CLINIC | Facility: CLINIC | Age: 58
End: 2023-03-17

## 2023-03-17 NOTE — PROGRESS NOTES
Complex Care Management Note:  Inbasket notification for complex outpatient care management received as identified via HRR report  Chart review completed  ED visits and Hospitalizations:   [facility] [place] [facility] 03/13/23 for failure to thrive  Patient is unable to care for self at home and wife was also unable to care for patient  Patient discharged to [facility] for STR  PMH Includes:  DM with A1C 11 1 on 11/14/22, CKD stage 3B, depression, anxiety, right knee fracture sustained 1/23/23, hx of falls with injury, hx of AUD, hx of amputed right foot  Refer to problem list for complete list of medical diagnosis  Admission or ED risk score is 79  Future Appointments are:    5/16/23 with Nephrology    Case meets screening criteria for complex care management  Will plan to outreach patient once discharged from facility back to home

## 2023-03-18 DIAGNOSIS — R80.9 DIABETES MELLITUS WITH PROTEINURIA (HCC): ICD-10-CM

## 2023-03-18 DIAGNOSIS — E11.29 DIABETES MELLITUS WITH PROTEINURIA (HCC): ICD-10-CM

## 2023-03-18 DIAGNOSIS — G47.00 INSOMNIA, UNSPECIFIED TYPE: ICD-10-CM

## 2023-03-20 RX ORDER — BLOOD-GLUCOSE METER
EACH MISCELLANEOUS 2 TIMES DAILY
Qty: 1 KIT | Refills: 0 | Status: SHIPPED | OUTPATIENT
Start: 2023-03-20

## 2023-03-20 RX ORDER — ZOLPIDEM TARTRATE 10 MG/1
10 TABLET ORAL
Qty: 10 TABLET | Refills: 0 | Status: SHIPPED | OUTPATIENT
Start: 2023-03-20 | End: 2023-03-30

## 2023-03-21 ENCOUNTER — PATIENT OUTREACH (OUTPATIENT)
Dept: CASE MANAGEMENT | Facility: OTHER | Age: 58
End: 2023-03-21

## 2023-03-21 ENCOUNTER — TELEPHONE (OUTPATIENT)
Dept: OBGYN CLINIC | Facility: HOSPITAL | Age: 58
End: 2023-03-21

## 2023-03-21 DIAGNOSIS — S82.141D CLOSED FRACTURE OF RIGHT TIBIAL PLATEAU WITH ROUTINE HEALING: Primary | ICD-10-CM

## 2023-03-21 NOTE — DISCHARGE SUMMARY
[address] [facility]  Discharge- Kristie Ortega 1965, 59 y o  male MRN: [id number]  Unit/Bed#: MS Janna-Joe Encounter: [id number]  Primary Care Provider: Manisha Barry MD   Date and time admitted to hospital: 3/3/2023 11:45 AM    * MARKELL (acute kidney injury) ([address] )  Assessment & Plan  · Likely pre renal in setting of hypovolemia while on home diuretics  · Now resolved with IV hydration  · Continue to hold home diuretics for now  · Renal US without evidence of obstruction  · Monitor creatinine     Depression and anxiety  Assessment & Plan  · Mentioned SI however now denying any more SI   · Evaluated by Psychiatry in the ED, recommending inpatient psych admission initially  Patient agreeable  · Started on lexapro 10mg, remeron 7 5mg, atarax 25mg q6h PRN  · Asked psych to re evaluate patient for disposition as patient expressed no further SI as his feeling mainly related to not being able to care for himself and is very motivated to go to rehab   Psych agreed that patient will benefit from rehab and no need for inpatient psych admission at this point      Physical deconditioning  Assessment & Plan  · Hx of fall and knee injury last month, unable to take care of self at home  · right tibial plateau comminuted fracture being treated conservatively by ortho outpatient   · PT/OT evaluation recommending rehab   · CM for placement  · Stable for discharge to rehab     Constipation-resolved as of 3/12/2023  Assessment & Plan  · No bowel movement in last 48 hours  · Rehab will not take patient until bowel movement  · Added milk of magnesia   · Now resolved         Medical Problems     Resolved Problems  Date Reviewed: 3/6/2023          Resolved    HHS vs DKA 3/10/2023     Resolved by  Paola Salazar MD    Constipation 3/12/2023     Resolved by  Paola Salazar MD        Discharging Physician / Practitioner: Brandon Yang MD  PCP: Manisha Barry MD  Admission Date:   Admission Orders (From admission, onward)     Ordered        03/03/23 [address]  Once            03/03/23 1802  Inpatient Admission  Once,   Status:  Canceled                      Discharge Date: 3/133/23    Consultations During Hospital Stay:  [address]  IP CONSULT TO CASE MANAGEMENT  · IP CONSULT TO PSYCHIATRY  ·     Procedures Performed:   · imaging    Significant Findings / Test Results:   Principal Problem:    MARKELL (acute kidney injury) ([address] )  Active Problems:    Hypertension, essential    Physical deconditioning    Depression and anxiety  Resolved Problems:    HHS vs DKA    Constipation  ·   ·     Incidental Findings:   · See above    Test Results Pending at Discharge (will require follow up): · None      Outpatient Tests Requested:  · None     Complications:  See above     Reason for Admission: MARKELL    Hospital Course:   Isi Antoine is a 59 y o  male patient who originally presented to the hospital on 3/3/2023 due to MARKELL secondary to hypovolemia from suspected over diuresis with home diuretic  Then later reported suicidal ideation which required psych consult  Psych did not feel patient required inpatient psych admission  He was placed in short term rehab  Condition at Discharge: good    Discharge Day Visit / Exam:   * Please refer to separate progress note for these details *    Discussion with Family: Patient declined call to   Discharge instructions/Information to patient and family:   See after visit summary for information provided to patient and family  Provisions for Follow-Up Care:  See after visit summary for information related to follow-up care and any pertinent home health orders  Disposition:   Home    Planned Readmission: none      Discharge Statement:  I spent 30+ minutes discharging the patient  This time was spent on the day of discharge  I had direct contact with the patient on the day of discharge   Greater than 50% of the total time was spent examining patient, answering all patient questions, arranging and discussing plan of care with patient as well as directly providing post-discharge instructions  Additional time then spent on discharge activities  Discharge Medications:  See after visit summary for reconciled discharge medications provided to patient and/or family        **Please Note: This note may have been constructed using a voice recognition system**

## 2023-03-21 NOTE — TELEPHONE ENCOUNTER
Caller: Patient's girlfriend    Doctor: Sarah Pool    Reason for call: Patient is currently a patient of Dr Ferrer Bondurant  [facility] [place] [place]  His girlfriend said the patient has to hire a transport to get him to his appointment on 3/23 and it's going to cost him $375 which they cannot afford  She wants to know if there is any way that patient could see Dr Pravin Kilgore in [facility]  Please advise      Call back#: [phone]

## 2023-03-21 NOTE — PROGRESS NOTES
This care manager assistant will continue to monitor via chart review during their skilled stay at [facility]  Email sent to facility who confirmed patient is skilled but had no LCD

## 2023-03-21 NOTE — ASSESSMENT & PLAN NOTE
· Hx of fall and knee injury last month, unable to take care of self at home  · right tibial plateau comminuted fracture being treated conservatively by ortho outpatient   · PT/OT evaluation recommending rehab   · CM for placement  · Stable for discharge to rehab

## 2023-03-21 NOTE — TELEPHONE ENCOUNTER
04/04/2023 is the next available appointments in [facility], is this okay to push f/u apt to that date?

## 2023-03-23 ENCOUNTER — OFFICE VISIT (OUTPATIENT)
Dept: OBGYN CLINIC | Facility: CLINIC | Age: 58
End: 2023-03-23

## 2023-03-23 ENCOUNTER — APPOINTMENT (OUTPATIENT)
Dept: RADIOLOGY | Facility: CLINIC | Age: 58
End: 2023-03-23

## 2023-03-23 VITALS
SYSTOLIC BLOOD PRESSURE: 125 MMHG | DIASTOLIC BLOOD PRESSURE: 78 MMHG | BODY MASS INDEX: 33.32 KG/M2 | HEIGHT: 75 IN | WEIGHT: 268 LBS | TEMPERATURE: 98.3 F | HEART RATE: 85 BPM

## 2023-03-23 DIAGNOSIS — S82.141D CLOSED FRACTURE OF RIGHT TIBIAL PLATEAU WITH ROUTINE HEALING: ICD-10-CM

## 2023-03-23 DIAGNOSIS — S82.141D CLOSED FRACTURE OF RIGHT TIBIAL PLATEAU WITH ROUTINE HEALING, SUBSEQUENT ENCOUNTER: Primary | ICD-10-CM

## 2023-03-23 NOTE — PROGRESS NOTES
Orthopaedics Office Visit - Established Patient Visit    ASSESSMENT/PLAN:    Assessment:   Right knee comminuted tibial plateau fracture sustained on 1/27/23  varus alignment of lower extremity however well maintained, has been using offloader brace  Significantly improved pain from previous visit    Plan:   · May now progress to WBAT RLE  · May use offloader just for ambulation  · PT for continued motion of knee  · followup in 2 months    To Do Next Visit:  Re-evaluation     _____________________________________________________  CHIEF COMPLAINT:  Chief Complaint   Patient presents with   • Right Knee - Follow-up         SUBJECTIVE:  Beverly Machado is a 59 y o  male who presents Right knee comminuted tibial plateau fracture sustained on 1/27/23 status post fall down stairs, varus alignment of lower extremity  The patient was last seen in the office on 2/7/23 where it was decided we should move forward with conservative treatment, increase aspirin to twice per day with Plavix, TROM brace provided, colace provided  Patient was eventually transitioned from [facility] to offloader brace and has been tolerating that better  His pain is much better improved than last visit and has been able to perform AROM of knee without pain        PAST MEDICAL HISTORY:  Past Medical History:   Diagnosis Date   • Broken internal right knee prosthesis ([facility] ) 01/2023   • Chronic kidney disease    • Colon polyp    • Constipation 3/9/2023   • Diabetes mellitus ([facility] )    • HHS vs DKA 11/16/2018   • Hyperlipidemia    • Hypertension    • Thyroid cancer ([facility] )        PAST SURGICAL HISTORY:  Past Surgical History:   Procedure Laterality Date   • COLONOSCOPY     • FOOT AMPUTATION Right    • FOOT SURGERY Right 2014   • IR AORTAGRAM WITH RUN-OFF  08/06/2020   • IR TUNNELED CENTRAL LINE PLACEMENT  09/08/2020   • IR TUNNELED CENTRAL LINE REMOVAL  09/28/2020   • CA AMPUTATION FOOT TRANSMETARSAL Right 09/03/2020    Procedure: AMPUTATION TRANSMETATARSAL (TMA); Surgeon: Miko Michel DPM;  Location: [facility];  Service: Podiatry   • VT THYROIDECTOMY TOTAL/COMPLETE N/A 02/03/2021    Procedure: TOTAL THYROIDECTOMY WITH NIMS MONITORING;  Surgeon: Lidia Rock MD;  Location:  MAIN OR;  Service: ENT   • TOE AMPUTATION Right 08/11/2020    Procedure: AMPUTATION TOE;  Surgeon: Miko Michel DPM;  Location: [facility];  Service: Podiatry   • US GUIDED THYROID BIOPSY  07/02/2020       FAMILY HISTORY:  Family History   Problem Relation Age of Onset   • Cancer Mother    • Hypertension Father        SOCIAL HISTORY:  Social History     Tobacco Use   • Smoking status: Never   • Smokeless tobacco: Never   Vaping Use   • Vaping Use: Never used   Substance Use Topics   • Alcohol use: Not Currently     Alcohol/week: 8 0 standard drinks     Types: 8 Cans of beer per week     Comment: last drink was 10 days ago   • Drug use: Yes     Types: Marijuana     Comment: occassionally       MEDICATIONS:    Current Outpatient Medications:   •  BD PEN NEEDLE DEV U/F 32G X 4 MM MISC, Inject under the skin 4 (four) times a day, Disp: 400 each, Rfl: 1  •  Blood Glucose Monitoring Suppl (ONE TOUCH ULTRA 2) w/Device KIT, Use 2 (two) times a day, Disp: 1 kit, Rfl: 0  •  Blood Glucose Monitoring Suppl (OneTouch Verio Reflect) w/Device KIT, Check blood sugars twice daily  Please substitute with appropriate alternative as covered by patient's insurance  Dx: E11 65, Disp: 1 kit, Rfl: 0  •  escitalopram (LEXAPRO) 10 mg tablet, Take 1 tablet (10 mg total) by mouth daily Do not start before March 14, 2023 , Disp: 30 tablet, Rfl: 0  •  glucose blood (OneTouch Verio) test strip, Check blood sugars twice daily  Please substitute with appropriate alternative as covered by patient's insurance   Dx: E11 65, Disp: 200 each, Rfl: 3  •  Insulin Glargine Solostar (Lantus SoloStar) 100 UNIT/ML SOPN, Inject 45 units bid, Disp: 225 mL, Rfl: 3  •  insulin glulisine (Apidra) 100 units/mL injection, Inject 30 units tid with meals, Disp: 60 mL, Rfl: 0  •  irbesartan-hydrochlorothiazide (AVALIDE) 150-12 5 MG per tablet, TAKE 1 TABLET DAILY, Disp: 90 tablet, Rfl: 3  •  levothyroxine 150 mcg tablet, Take 1 tablet (150 mcg total) by mouth daily, Disp: 90 tablet, Rfl: 3  •  mirtazapine (REMERON) 7 5 MG tablet, Take 1 tablet (7 5 mg total) by mouth daily at bedtime, Disp: 30 tablet, Rfl: 0  •  omeprazole (PriLOSEC) 40 MG capsule, TAKE 1 CAPSULE DAILY, Disp: 90 capsule, Rfl: 3  •  OneTouch Delica Lancets 13V MISC, Check blood sugars twice daily  Please substitute with appropriate alternative as covered by patient's insurance   Dx: E11 65, Disp: 200 each, Rfl: 3  •  tamsulosin (FLOMAX) 0 4 mg, Take 1 capsule (0 4 mg total) by mouth daily with dinner, Disp: 30 capsule, Rfl: 0  •  traMADol (Ultram) 50 mg tablet, Take 1 tablet (50 mg total) by mouth every 6 (six) hours as needed for moderate pain for up to 10 days, Disp: 40 tablet, Rfl: 0  •  verapamil (VERELAN PM) 360 MG 24 hr capsule, TAKE 1 CAPSULE DAILY AT BEDTIME, Disp: 90 capsule, Rfl: 3  •  zolpidem (AMBIEN) 10 mg tablet, Take 1 tablet (10 mg total) by mouth daily at bedtime as needed for sleep for up to 10 days, Disp: 10 tablet, Rfl: 0  •  aspirin 81 mg chewable tablet, Chew 1 tablet (81 mg total) daily, Disp: 30 tablet, Rfl: 0  •  atorvastatin (LIPITOR) 40 mg tablet, Take 1 tablet (40 mg total) by mouth daily with dinner, Disp: 90 tablet, Rfl: 3  •  clopidogrel (PLAVIX) 75 mg tablet, Take 1 tablet (75 mg total) by mouth daily, Disp: 90 tablet, Rfl: 3  •  docusate sodium (COLACE) 100 mg capsule, Take 1 capsule (100 mg total) by mouth 2 (two) times a day (Patient not taking: Reported on 3/3/2023), Disp: 30 capsule, Rfl: 0  •  naltrexone (REVIA) 50 mg tablet, Take 1 tablet (50 mg total) by mouth daily (Patient not taking: Reported on 3/3/2023), Disp: 30 tablet, Rfl: 5  •  polyethylene glycol (MIRALAX) 17 g packet, Take 17 g by mouth daily (Patient not taking: Reported on 3/3/2023), Disp: 14 each, Rfl: 0  •  senna (SENOKOT) 8 6 MG tablet, Take 1 tablet (8 6 mg total) by mouth daily (Patient not taking: Reported on 3/3/2023), Disp: 30 tablet, Rfl: 0    ALLERGIES:  Allergies   Allergen Reactions   • Pollen Extract Eye Swelling     Eyes get watery        REVIEW OF SYSTEMS:  MSK: right knee  pain  Neuro: WNL  Pertinent items are otherwise noted in HPI  A comprehensive review of systems was otherwise negative  LABS:  HgA1c:   Lab Results   Component Value Date    HGBA1C 11 1 (H) 11/14/2022     BMP:   Lab Results   Component Value Date    CALCIUM 8 9 03/07/2023    K 3 7 03/07/2023    CO2 26 03/07/2023     03/07/2023    BUN 16 03/07/2023    CREATININE 0 92 03/07/2023     CBC: No components found for: CBC    _____________________________________________________  PHYSICAL EXAMINATION:  Vital signs: /78   Pulse 85   Temp 98 3 °F (36 8 °C)   Ht 6' 3" (1 905 m)   Wt 122 kg (268 lb)   BMI 33 50 kg/m²   General: No acute distress, awake and alert  Psychiatric: Mood and affect appear appropriate  HEENT: Trachea Midline, No torticollis, no apparent facial trauma  Cardiovascular: No audible murmurs; Extremities appear perfused  Pulmonary: No audible wheezing or stridor  Skin: No open lesions; see further details (if any) below    MUSCULOSKELETAL EXAMINATION:  Extremities:    Right Knee  There is no effusion  No tenderness to palpation over proximal tibia  Mild varus deformity of lower extremity persists  Thigh and calf compressible  No calf swelling, nontender to palpation  Baseline nv status intact      _____________________________________________________  STUDIES REVIEWED:  I personally reviewed the images and interpretation is as follows:    Plain films of R knee reveal unchanged alignment with mild resolution of medial fracture line    PROCEDURES PERFORMED:  Procedures  None performed       April Enriquez MD

## 2023-03-24 ENCOUNTER — VBI (OUTPATIENT)
Dept: ADMINISTRATIVE | Facility: OTHER | Age: 58
End: 2023-03-24

## 2023-03-28 ENCOUNTER — PATIENT OUTREACH (OUTPATIENT)
Dept: CASE MANAGEMENT | Facility: OTHER | Age: 58
End: 2023-03-28

## 2023-03-28 NOTE — PROGRESS NOTES
Chart review completed  Email sent to facility requesting update on patient  This care manager assistant will continue to monitor via chart review throughout episode  Update received the patient is receiving skilled care @ [facility] no LCD the goal is for the patient to return home with his wife

## 2023-04-04 ENCOUNTER — PATIENT OUTREACH (OUTPATIENT)
Dept: CASE MANAGEMENT | Facility: OTHER | Age: 58
End: 2023-04-04

## 2023-04-04 NOTE — PROGRESS NOTES
Chart review completed  Email sent to facility requesting update on patient  This care manager assistant will continue to monitor via chart review throughout bundle episode  Update received the patient is now WBAT  He has done well with progressing with transfers while incorporating WBAT  He is still having trouble with ambulation, which is currently our main target area to promote highest practical level of function  His UB ADLs have always been strong, however LB ADLs are still a work in progress with requiring some steady assist for anything that requires standing  His safety awareness and decision making is improving as well

## 2023-04-13 PROBLEM — S82.131A RIGHT MEDIAL TIBIAL PLATEAU FRACTURE, CLOSED, INITIAL ENCOUNTER: Status: ACTIVE | Noted: 2023-04-13

## 2023-04-13 NOTE — H&P (VIEW-ONLY)
Orthopaedics Office Visit - Follow up  Patient Visit    ASSESSMENT/PLAN:    Assessment:   Right knee comminuted tibial plateau fracture sustained on 1/27/23  Increased displacement and varus deformity in comparison to recent radiographs   PAD (prior history of stent placement in 2020, DM II)     Most recent A1c over 11%    Advised patient is at very high risk for perioperative complications  Reiterated that initially, surgical fixation was recommended treatment for this pattern  However with shared decision making and with patient declined surgical intervention for fear of postop complications  Now, fracture has collapsed with subluxation of knee joint  I advised that I cannot guarantee anatomic alignment of fracture and stable knee joint/restoration of mechanical axis given chronicity of the fracture and significant displacement  Additionally he remains at severely high risk for complications secondary to PAD and uncontrolled DM    He will need to hold plavix 5 days preop and needs stat vascular eval and vascular studies to evaluate arterial flow to leg and ability to heal an incision      Plan:   - Discussed conservative and surgical intervention with patient at length  Patient opted for surgical intervention at this time   - Had extensive discussion in regards to continued non-operative treatment  Vs operative management   - Risks and benefits were discussed with patient at length    Procedure being performed: Open reduction internal fixation right tibial plateau fracture and all other associated procedures  informed consent was obtained at this time  - Begin non-weight bearing right lower extremity   - Would strongly recommend vascular surgery and PCP clearance    - Over the counter analgesics as needed / directed   - Ice / heat as directed   - Follow up post operatively, sutures out, XR       To Do Next Visit:  Sutures out, XR right knee   _____________________________________________________  CHIEF COMPLAINT:  Chief Complaint   Patient presents with   • Right Knee - Follow-up         SUBJECTIVE:  Jairo Vogel is a 59 y o  male who presents to the office for a follow up for his right knee  Patient states that his knee was doing well overall until a few days ago  Patient states that he had increased   Swelling with mild increase in pain associated with participation in physical therapy  Patient states that he has noticed any increased level deformity present in the lower extremity  Patient denies any falls  Patient states that when he does have pain is typically over the medial aspect of the leg  Patient has been partial weightbearing on the right lower extremity progressing to full weightbearing  Patient denies any numbness or tingling in his feet  Patient has a known case of diabetes, GERD, PAD  Patient offers no other complaints at this time  Patient underwent a vascular ultrasound earlier this week which the patient claims was negative for acute blood clots       PAST MEDICAL HISTORY:  Past Medical History:   Diagnosis Date   • Broken internal right knee prosthesis [facility] ) 01/2023   • Chronic kidney disease    • Colon polyp    • Constipation 3/9/2023   • Diabetes mellitus ([facility] )    • HHS vs DKA 11/16/2018   • Hyperlipidemia    • Hypertension    • Thyroid cancer ([facility] )        PAST SURGICAL HISTORY:  Past Surgical History:   Procedure Laterality Date   • COLONOSCOPY     • FOOT AMPUTATION Right    • FOOT SURGERY Right 2014   • IR AORTAGRAM WITH RUN-OFF  08/06/2020   • IR TUNNELED CENTRAL LINE PLACEMENT  09/08/2020   • IR TUNNELED CENTRAL LINE REMOVAL  09/28/2020   • SD AMPUTATION FOOT TRANSMETARSAL Right 09/03/2020    Procedure: AMPUTATION TRANSMETATARSAL (TMA);  Surgeon: Yaquelin Gunn DPM;  Location: [facility];  Service: Podiatry   • SD THYROIDECTOMY TOTAL/COMPLETE N/A 02/03/2021    Procedure: TOTAL THYROIDECTOMY WITH NIMS MONITORING;  Surgeon: Sunny Jacome MD;  Location:  MAIN OR;  Service: ENT • TOE AMPUTATION Right 08/11/2020    Procedure: AMPUTATION TOE;  Surgeon: Jamir Wesley DPM;  Location: [facility];  Service: Podiatry   • US GUIDED THYROID BIOPSY  07/02/2020       FAMILY HISTORY:  Family History   Problem Relation Age of Onset   • Cancer Mother    • Hypertension Father        SOCIAL HISTORY:  Social History     Tobacco Use   • Smoking status: Never   • Smokeless tobacco: Never   Vaping Use   • Vaping Use: Never used   Substance Use Topics   • Alcohol use: Not Currently     Alcohol/week: 8 0 standard drinks     Types: 8 Cans of beer per week     Comment: last drink was 10 days ago   • Drug use: Yes     Types: Marijuana     Comment: occassionally       MEDICATIONS:    Current Outpatient Medications:   •  BD PEN NEEDLE DEV U/F 32G X 4 MM MISC, Inject under the skin 4 (four) times a day, Disp: 400 each, Rfl: 1  •  Blood Glucose Monitoring Suppl (ONE TOUCH ULTRA 2) w/Device KIT, Use 2 (two) times a day, Disp: 1 kit, Rfl: 0  •  Blood Glucose Monitoring Suppl (OneTouch Verio Reflect) w/Device KIT, Check blood sugars twice daily  Please substitute with appropriate alternative as covered by patient's insurance  Dx: E11 65, Disp: 1 kit, Rfl: 0  •  escitalopram (LEXAPRO) 10 mg tablet, Take 1 tablet (10 mg total) by mouth daily Do not start before March 14, 2023 , Disp: 30 tablet, Rfl: 0  •  glucose blood (OneTouch Verio) test strip, Check blood sugars twice daily  Please substitute with appropriate alternative as covered by patient's insurance   Dx: E11 65, Disp: 200 each, Rfl: 3  •  Insulin Glargine Solostar (Lantus SoloStar) 100 UNIT/ML SOPN, Inject 45 units bid, Disp: 225 mL, Rfl: 3  •  insulin glulisine (Apidra) 100 units/mL injection, Inject 30 units tid with meals, Disp: 60 mL, Rfl: 0  •  irbesartan-hydrochlorothiazide (AVALIDE) 150-12 5 MG per tablet, TAKE 1 TABLET DAILY, Disp: 90 tablet, Rfl: 3  •  levothyroxine 150 mcg tablet, Take 1 tablet (150 mcg total) by mouth daily, Disp: 90 tablet, Rfl: 3  •  omeprazole (PriLOSEC) 40 MG capsule, TAKE 1 CAPSULE DAILY, Disp: 90 capsule, Rfl: 3  •  OneTouch Delica Lancets 68C MISC, Check blood sugars twice daily  Please substitute with appropriate alternative as covered by patient's insurance  Dx: E11 65, Disp: 200 each, Rfl: 3  •  verapamil (VERELAN PM) 360 MG 24 hr capsule, TAKE 1 CAPSULE DAILY AT BEDTIME, Disp: 90 capsule, Rfl: 3  •  aspirin 81 mg chewable tablet, Chew 1 tablet (81 mg total) daily, Disp: 30 tablet, Rfl: 0  •  atorvastatin (LIPITOR) 40 mg tablet, Take 1 tablet (40 mg total) by mouth daily with dinner, Disp: 90 tablet, Rfl: 3  •  clopidogrel (PLAVIX) 75 mg tablet, Take 1 tablet (75 mg total) by mouth daily, Disp: 90 tablet, Rfl: 3  •  docusate sodium (COLACE) 100 mg capsule, Take 1 capsule (100 mg total) by mouth 2 (two) times a day (Patient not taking: Reported on 3/3/2023), Disp: 30 capsule, Rfl: 0  •  mirtazapine (REMERON) 7 5 MG tablet, Take 1 tablet (7 5 mg total) by mouth daily at bedtime, Disp: 30 tablet, Rfl: 0  •  naltrexone (REVIA) 50 mg tablet, Take 1 tablet (50 mg total) by mouth daily (Patient not taking: Reported on 3/3/2023), Disp: 30 tablet, Rfl: 5  •  polyethylene glycol (MIRALAX) 17 g packet, Take 17 g by mouth daily (Patient not taking: Reported on 3/3/2023), Disp: 14 each, Rfl: 0  •  senna (SENOKOT) 8 6 MG tablet, Take 1 tablet (8 6 mg total) by mouth daily (Patient not taking: Reported on 3/3/2023), Disp: 30 tablet, Rfl: 0  •  tamsulosin (FLOMAX) 0 4 mg, Take 1 capsule (0 4 mg total) by mouth daily with dinner, Disp: 30 capsule, Rfl: 0  •  zolpidem (AMBIEN) 10 mg tablet, Take 1 tablet (10 mg total) by mouth daily at bedtime as needed for sleep for up to 10 days, Disp: 10 tablet, Rfl: 0    ALLERGIES:  Allergies   Allergen Reactions   • Pollen Extract Eye Swelling     Eyes get watery        REVIEW OF SYSTEMS:  MSK: right knee pain   Neuro: WNL   Pertinent items are otherwise noted in HPI    A comprehensive review of systems was "otherwise negative  LABS:  HgA1c:   Lab Results   Component Value Date    HGBA1C 11 1 (H) 11/14/2022     BMP:   Lab Results   Component Value Date    CALCIUM 8 9 03/07/2023    K 3 7 03/07/2023    CO2 26 03/07/2023     03/07/2023    BUN 16 03/07/2023    CREATININE 0 92 03/07/2023     CBC: No components found for: CBC    _____________________________________________________  PHYSICAL EXAMINATION:  Vital signs: BP (!) 83/54   Pulse 79   Ht 6' 3\" (1 905 m)   Wt 122 kg (268 lb)   BMI 33 50 kg/m²   General: No acute distress, awake and alert  Psychiatric: Mood and affect appear appropriate  HEENT: Trachea Midline, No torticollis, no apparent facial trauma  Cardiovascular: No audible murmurs; Extremities appear perfused  Pulmonary: No audible wheezing or stridor  Skin: No open lesions; see further details (if any) below      MUSCULOSKELETAL EXAMINATION:  Right knee examination:  - Patient sitting comfortably in the office in no acute distress   -Large effusion present in the right knee with varus deformity  Diffuse swelling noted throughout the right lower extremity  -Mild tenderness palpation noted over the medial tibial plateau  No other bony or soft tissue tenderness palpation noted at this time   -Limited range of motion of the right knee in all planes of motion with crepitus appreciated  - NV intact    _____________________________________________________  STUDIES REVIEWED:  I personally reviewed the images and interpretation is as follows:  Right knee XR 3 views:  Persistent fracture noted of the medial tibial plateau with increased displacement in comparison to previous radiographs  Genu varum deformity present      PROCEDURES PERFORMED:  No procedures were performed at this time  Ken Pendleton PA-C - assisting  Brittnee Hilario                  Portions of the record may have been created with voice recognition software    Occasional wrong word or \"sound a like\" substitutions may " have occurred due to the inherent limitations of voice recognition software  Read the chart carefully and recognize, using context, where substitutions have occurred

## 2023-04-24 ENCOUNTER — OFFICE VISIT (OUTPATIENT)
Dept: FAMILY MEDICINE CLINIC | Facility: CLINIC | Age: 58
End: 2023-04-24

## 2023-04-24 ENCOUNTER — APPOINTMENT (OUTPATIENT)
Dept: LAB | Facility: CLINIC | Age: 58
End: 2023-04-24

## 2023-04-24 ENCOUNTER — HOSPITAL ENCOUNTER (OUTPATIENT)
Dept: CT IMAGING | Facility: CLINIC | Age: 58
Discharge: HOME | End: 2023-04-24

## 2023-04-24 VITALS
HEART RATE: 76 BPM | SYSTOLIC BLOOD PRESSURE: 108 MMHG | OXYGEN SATURATION: 97 % | TEMPERATURE: 96.8 F | DIASTOLIC BLOOD PRESSURE: 66 MMHG | HEIGHT: 75 IN | BODY MASS INDEX: 33.5 KG/M2

## 2023-04-24 DIAGNOSIS — S82.131G: ICD-10-CM

## 2023-04-24 DIAGNOSIS — I10 HYPERTENSION, ESSENTIAL: ICD-10-CM

## 2023-04-24 DIAGNOSIS — K21.9 GASTROESOPHAGEAL REFLUX DISEASE WITHOUT ESOPHAGITIS: ICD-10-CM

## 2023-04-24 DIAGNOSIS — E83.9 CHRONIC KIDNEY DISEASE-MINERAL AND BONE DISORDER: ICD-10-CM

## 2023-04-24 DIAGNOSIS — F32.A DEPRESSION, UNSPECIFIED DEPRESSION TYPE: ICD-10-CM

## 2023-04-24 DIAGNOSIS — E11.51 TYPE 2 DIABETES MELLITUS WITH DIABETIC PERIPHERAL ANGIOPATHY WITHOUT GANGRENE, WITH LONG-TERM CURRENT USE OF INSULIN (HCC): ICD-10-CM

## 2023-04-24 DIAGNOSIS — R94.31 ABNORMAL EKG: ICD-10-CM

## 2023-04-24 DIAGNOSIS — M89.9 CHRONIC KIDNEY DISEASE-MINERAL AND BONE DISORDER: ICD-10-CM

## 2023-04-24 DIAGNOSIS — E78.5 HYPERLIPIDEMIA, UNSPECIFIED HYPERLIPIDEMIA TYPE: ICD-10-CM

## 2023-04-24 DIAGNOSIS — G47.00 INSOMNIA, UNSPECIFIED TYPE: ICD-10-CM

## 2023-04-24 DIAGNOSIS — Z01.818 PREOPERATIVE EXAMINATION: Primary | ICD-10-CM

## 2023-04-24 DIAGNOSIS — E03.9 ACQUIRED HYPOTHYROIDISM: ICD-10-CM

## 2023-04-24 DIAGNOSIS — N18.9 CHRONIC KIDNEY DISEASE-MINERAL AND BONE DISORDER: ICD-10-CM

## 2023-04-24 DIAGNOSIS — S82.131A RIGHT MEDIAL TIBIAL PLATEAU FRACTURE, CLOSED, INITIAL ENCOUNTER: ICD-10-CM

## 2023-04-24 DIAGNOSIS — N18.32 STAGE 3B CHRONIC KIDNEY DISEASE (HCC): ICD-10-CM

## 2023-04-24 DIAGNOSIS — I73.9 PAD (PERIPHERAL ARTERY DISEASE) (HCC): ICD-10-CM

## 2023-04-24 DIAGNOSIS — Z89.431 ACQUIRED ABSENCE OF RIGHT FOOT (HCC): ICD-10-CM

## 2023-04-24 DIAGNOSIS — Z79.4 TYPE 2 DIABETES MELLITUS WITH DIABETIC PERIPHERAL ANGIOPATHY WITHOUT GANGRENE, WITH LONG-TERM CURRENT USE OF INSULIN (HCC): ICD-10-CM

## 2023-04-24 DIAGNOSIS — N18.30 STAGE 3 CHRONIC KIDNEY DISEASE, UNSPECIFIED WHETHER STAGE 3A OR 3B CKD (HCC): ICD-10-CM

## 2023-04-24 DIAGNOSIS — D64.9 ANEMIA, UNSPECIFIED TYPE: ICD-10-CM

## 2023-04-24 DIAGNOSIS — E66.9 OBESITY, CLASS I, BMI 30.0-34.9 (SEE ACTUAL BMI): ICD-10-CM

## 2023-04-24 PROBLEM — R62.7 FAILURE TO THRIVE IN ADULT: Status: RESOLVED | Noted: 2023-03-03 | Resolved: 2023-04-24

## 2023-04-24 LAB
25(OH)D3 SERPL-MCNC: 16.8 NG/ML (ref 30–100)
ALBUMIN SERPL BCP-MCNC: 3.7 G/DL (ref 3.5–5)
ALP SERPL-CCNC: 148 U/L (ref 46–116)
ALT SERPL W P-5'-P-CCNC: 22 U/L (ref 12–78)
ANION GAP SERPL CALCULATED.3IONS-SCNC: 9 MMOL/L (ref 4–13)
AST SERPL W P-5'-P-CCNC: 26 U/L (ref 5–45)
BASOPHILS # BLD AUTO: 0.06 THOUSANDS/ΜL (ref 0–0.1)
BASOPHILS NFR BLD AUTO: 1 % (ref 0–1)
BILIRUB SERPL-MCNC: 0.61 MG/DL (ref 0.2–1)
BUN SERPL-MCNC: 24 MG/DL (ref 5–25)
CALCIUM SERPL-MCNC: 10 MG/DL (ref 8.3–10.1)
CHLORIDE SERPL-SCNC: 102 MMOL/L (ref 96–108)
CHOLEST SERPL-MCNC: 129 MG/DL
CO2 SERPL-SCNC: 23 MMOL/L (ref 21–32)
CREAT SERPL-MCNC: 1.14 MG/DL (ref 0.6–1.3)
EOSINOPHIL # BLD AUTO: 0.18 THOUSAND/ΜL (ref 0–0.61)
EOSINOPHIL NFR BLD AUTO: 2 % (ref 0–6)
ERYTHROCYTE [DISTWIDTH] IN BLOOD BY AUTOMATED COUNT: 13.2 % (ref 11.6–15.1)
GFR SERPL CREATININE-BSD FRML MDRD: 69 ML/MIN/1.73SQ M
GLUCOSE P FAST SERPL-MCNC: 136 MG/DL (ref 65–99)
HCT VFR BLD AUTO: 34.8 % (ref 36.5–49.3)
HDLC SERPL-MCNC: 31 MG/DL
HGB BLD-MCNC: 11.4 G/DL (ref 12–17)
IMM GRANULOCYTES # BLD AUTO: 0.04 THOUSAND/UL (ref 0–0.2)
IMM GRANULOCYTES NFR BLD AUTO: 0 % (ref 0–2)
LDLC SERPL CALC-MCNC: 75 MG/DL (ref 0–100)
LYMPHOCYTES # BLD AUTO: 1.81 THOUSANDS/ΜL (ref 0.6–4.47)
LYMPHOCYTES NFR BLD AUTO: 18 % (ref 14–44)
MCH RBC QN AUTO: 30.6 PG (ref 26.8–34.3)
MCHC RBC AUTO-ENTMCNC: 32.8 G/DL (ref 31.4–37.4)
MCV RBC AUTO: 94 FL (ref 82–98)
MONOCYTES # BLD AUTO: 1.04 THOUSAND/ΜL (ref 0.17–1.22)
MONOCYTES NFR BLD AUTO: 10 % (ref 4–12)
NEUTROPHILS # BLD AUTO: 7.2 THOUSANDS/ΜL (ref 1.85–7.62)
NEUTS SEG NFR BLD AUTO: 69 % (ref 43–75)
NONHDLC SERPL-MCNC: 98 MG/DL
NRBC BLD AUTO-RTO: 0 /100 WBCS
PHOSPHATE SERPL-MCNC: 2.9 MG/DL (ref 2.3–4.1)
PLATELET # BLD AUTO: 401 THOUSANDS/UL (ref 149–390)
PMV BLD AUTO: 11.4 FL (ref 8.9–12.7)
POTASSIUM SERPL-SCNC: 4.4 MMOL/L (ref 3.5–5.3)
PROT SERPL-MCNC: 8.5 G/DL (ref 6.4–8.4)
PTH-INTACT SERPL-MCNC: 61.3 PG/ML (ref 18.4–80.1)
RBC # BLD AUTO: 3.72 MILLION/UL (ref 3.88–5.62)
SODIUM SERPL-SCNC: 134 MMOL/L (ref 135–147)
TRIGL SERPL-MCNC: 117 MG/DL
WBC # BLD AUTO: 10.33 THOUSAND/UL (ref 4.31–10.16)

## 2023-04-24 RX ORDER — ZOLPIDEM TARTRATE 10 MG/1
10 TABLET ORAL
Qty: 90 TABLET | Refills: 1 | Status: SHIPPED | OUTPATIENT
Start: 2023-04-24 | End: 2023-04-25 | Stop reason: SDUPTHER

## 2023-04-24 NOTE — PROGRESS NOTES
Name: Zoie Tellez      : 1965      MRN: [id number]  Encounter Provider: Hill Hackett MD  Encounter Date: 2023   Encounter department: [facility] [address]   Will get cardiology evaluation for abnormal EKG prior to clearing for surgery  1  Preoperative examination  -     Ambulatory Referral to Cardiology; Future    2  Closed fracture of medial portion of right tibial plateau with delayed healing, subsequent encounter  -     Ambulatory Referral to Cardiology; Future    3  Abnormal EKG  -     Ambulatory Referral to Cardiology; Future    4  Acquired hypothyroidism  Assessment & Plan:  Continue levothyroxine 150 mcg daily      5  Gastroesophageal reflux disease without esophagitis  Assessment & Plan:  Continue Prilosec 40 mg daily      6  PAD (peripheral artery disease) ([facility])  Assessment & Plan:  Continue Plavix 75 mg daily      7  Hypertension, essential  Assessment & Plan:  Continue Avalide 150-12 5 mg daily      8  Stage 3b chronic kidney disease ([facility])    9  Obesity, Class I, BMI 30 0-34 9 (see actual BMI)    10  Hyperlipidemia, unspecified hyperlipidemia type  Assessment & Plan:  Continue Lipitor 40 mg daily      11  Acquired absence of right foot ([facility] [address] )    12  Depression, unspecified depression type    13  Insomnia, unspecified type  -     zolpidem (AMBIEN) 10 mg tablet; Take 1 tablet (10 mg total) by mouth daily at bedtime as needed for sleep for up to 10 days    14  Type 2 diabetes mellitus with diabetic peripheral angiopathy without gangrene, with long-term current use of insulin ([facility])  Assessment & Plan:  Continue Lantus 45 units twice daily and Apidra 30 units 3 times daily  Lab Results   Component Value Date    HGBA1C 11 1 (H) 2022       Orders:  -     Hemoglobin A1C; Future; Expected date: 2023    BMI Counseling: Body mass index is 33 5 kg/m²   The BMI is above normal  Nutrition recommendations include decreasing portion sizes and moderation in carbohydrate intake  Exercise recommendations include exercising 3-5 times per week  No pharmacotherapy was ordered  Rationale for BMI follow-up plan is due to patient being overweight or obese  Subjective      Patient comes in for preoperative clearance for ORIF of nonhealing right tibial fracture  Review of Systems   Constitutional: Negative  HENT: Negative  Respiratory: Negative  Cardiovascular: Negative  Musculoskeletal: Positive for gait problem  Allergic/Immunologic: Negative  Hematological: Negative  Current Outpatient Medications on File Prior to Visit   Medication Sig   • BD PEN NEEDLE DEV U/F 32G X 4 MM MISC Inject under the skin 4 (four) times a day   • Blood Glucose Monitoring Suppl (ONE TOUCH ULTRA 2) w/Device KIT Use 2 (two) times a day   • Blood Glucose Monitoring Suppl (OneTouch Verio Reflect) w/Device KIT Check blood sugars twice daily  Please substitute with appropriate alternative as covered by patient's insurance  Dx: E11 65   • docusate sodium (COLACE) 100 mg capsule Take 1 capsule (100 mg total) by mouth 2 (two) times a day   • glucose blood (OneTouch Verio) test strip Check blood sugars twice daily  Please substitute with appropriate alternative as covered by patient's insurance  Dx: E11 65   • Insulin Glargine Solostar (Lantus SoloStar) 100 UNIT/ML SOPN Inject 45 units bid   • insulin glulisine (Apidra) 100 units/mL injection Inject 30 units tid with meals   • irbesartan-hydrochlorothiazide (AVALIDE) 150-12 5 MG per tablet TAKE 1 TABLET DAILY   • levothyroxine 150 mcg tablet Take 1 tablet (150 mcg total) by mouth daily   • omeprazole (PriLOSEC) 40 MG capsule TAKE 1 CAPSULE DAILY   • OneTouch Delica Lancets 79W MISC Check blood sugars twice daily  Please substitute with appropriate alternative as covered by patient's insurance   Dx: E11 65   • verapamil (VERELAN PM) 360 MG 24 hr capsule TAKE 1 CAPSULE DAILY AT BEDTIME   • "[DISCONTINUED] zolpidem (AMBIEN) 10 mg tablet Take 1 tablet (10 mg total) by mouth daily at bedtime as needed for sleep for up to 10 days   • aspirin 81 mg chewable tablet Chew 1 tablet (81 mg total) daily   • atorvastatin (LIPITOR) 40 mg tablet Take 1 tablet (40 mg total) by mouth daily with dinner   • clopidogrel (PLAVIX) 75 mg tablet Take 1 tablet (75 mg total) by mouth daily   • escitalopram (LEXAPRO) 10 mg tablet Take 1 tablet (10 mg total) by mouth daily Do not start before March 14, 2023  • mirtazapine (REMERON) 7 5 MG tablet Take 1 tablet (7 5 mg total) by mouth daily at bedtime   • tamsulosin (FLOMAX) 0 4 mg Take 1 capsule (0 4 mg total) by mouth daily with dinner   • [DISCONTINUED] naltrexone (REVIA) 50 mg tablet Take 1 tablet (50 mg total) by mouth daily (Patient not taking: Reported on 3/3/2023)   • [DISCONTINUED] polyethylene glycol (MIRALAX) 17 g packet Take 17 g by mouth daily (Patient not taking: Reported on 3/3/2023)   • [DISCONTINUED] senna (SENOKOT) 8 6 MG tablet Take 1 tablet (8 6 mg total) by mouth daily (Patient not taking: Reported on 3/3/2023)       Objective     /66 (BP Location: Left arm, Patient Position: Sitting, Cuff Size: Large)   Pulse 76   Temp (!) 96 8 °F (36 °C)   Ht 6' 3\" (1 905 m)   SpO2 97%   BMI 33 50 kg/m²     Physical Exam  Constitutional:       Appearance: He is well-developed  He is obese  HENT:      Head: Normocephalic and atraumatic  Right Ear: Tympanic membrane, ear canal and external ear normal       Left Ear: Tympanic membrane, ear canal and external ear normal       Nose: Nose normal       Mouth/Throat:      Mouth: Mucous membranes are moist       Pharynx: Oropharynx is clear  Eyes:      Pupils: Pupils are equal, round, and reactive to light  Cardiovascular:      Rate and Rhythm: Normal rate and regular rhythm  Heart sounds: Normal heart sounds  Pulmonary:      Effort: Pulmonary effort is normal       Breath sounds: Normal breath sounds   " Abdominal:      General: Bowel sounds are normal       Palpations: Abdomen is soft  There is no mass  Tenderness: There is no abdominal tenderness  Musculoskeletal:      Cervical back: Neck supple  Comments: Wheelchair-bound  Absence of right foot   Lymphadenopathy:      Cervical: No cervical adenopathy  Skin:     General: Skin is warm and dry  Neurological:      Mental Status: He is alert  Psychiatric:         Mood and Affect: Mood normal          Behavior: Behavior normal          Thought Content:  Thought content normal        Julianne Mcintyre MD

## 2023-04-24 NOTE — LETTER
2023     Alesha Welsh MD  [address]  [place] [address]    Patient: Zoie Tellez   YOB: 1965   Date of Visit: 2023       Dear Dr Kavya Mason:    Thank you for referring Zoie Tellez to me for evaluation  Below are my notes for this consultation  If you have questions, please do not hesitate to call me  I look forward to following your patient along with you  Patient had abnormal EKG today  He is asked to see cardiology prior to preoperative clearance  Sincerely,        Hill Hackett MD        CC: No Recipients  Hill Hackett MD  2023 10:58 AM  Sign when Signing Visit  Name: Zoie Tellez      : 1965      MRN: [id number]  Encounter Provider: Hill Hackett MD  Encounter Date: 2023   Encounter department: [facility] [address]   Will get cardiology evaluation for abnormal EKG prior to clearing for surgery  1  Preoperative examination  -     Ambulatory Referral to Cardiology; Future    2  Closed fracture of medial portion of right tibial plateau with delayed healing, subsequent encounter  -     Ambulatory Referral to Cardiology; Future    3  Abnormal EKG  -     Ambulatory Referral to Cardiology; Future    4  Acquired hypothyroidism  Assessment & Plan:  Continue levothyroxine 150 mcg daily      5  Gastroesophageal reflux disease without esophagitis  Assessment & Plan:  Continue Prilosec 40 mg daily      6  PAD (peripheral artery disease) (HCC)  Assessment & Plan:  Continue Plavix 75 mg daily      7  Hypertension, essential  Assessment & Plan:  Continue Avalide 150-12 5 mg daily      8  Stage 3b chronic kidney disease (HCC)    9  Obesity, Class I, BMI 30 0-34 9 (see actual BMI)    10  Hyperlipidemia, unspecified hyperlipidemia type  Assessment & Plan:  Continue Lipitor 40 mg daily      11  Acquired absence of right foot ([facility] [address] )    12   Depression, unspecified depression type    13  Insomnia, unspecified type  -     zolpidem (AMBIEN) 10 mg tablet; Take 1 tablet (10 mg total) by mouth daily at bedtime as needed for sleep for up to 10 days    14  Type 2 diabetes mellitus with diabetic peripheral angiopathy without gangrene, with long-term current use of insulin ([facility])  Assessment & Plan:  Continue Lantus 45 units twice daily and Apidra 30 units 3 times daily  Lab Results   Component Value Date    HGBA1C 11 1 (H) 11/14/2022       Orders:  -     Hemoglobin A1C; Future; Expected date: 04/24/2023    BMI Counseling: Body mass index is 33 5 kg/m²  The BMI is above normal  Nutrition recommendations include decreasing portion sizes and moderation in carbohydrate intake  Exercise recommendations include exercising 3-5 times per week  No pharmacotherapy was ordered  Rationale for BMI follow-up plan is due to patient being overweight or obese  Subjective      Patient comes in for preoperative clearance for ORIF of nonhealing right tibial fracture  Review of Systems   Constitutional: Negative  HENT: Negative  Respiratory: Negative  Cardiovascular: Negative  Musculoskeletal: Positive for gait problem  Allergic/Immunologic: Negative  Hematological: Negative  Current Outpatient Medications on File Prior to Visit   Medication Sig   • BD PEN NEEDLE DEV U/F 32G X 4 MM MISC Inject under the skin 4 (four) times a day   • Blood Glucose Monitoring Suppl (ONE TOUCH ULTRA 2) w/Device KIT Use 2 (two) times a day   • Blood Glucose Monitoring Suppl (OneTouch Verio Reflect) w/Device KIT Check blood sugars twice daily  Please substitute with appropriate alternative as covered by patient's insurance  Dx: E11 65   • docusate sodium (COLACE) 100 mg capsule Take 1 capsule (100 mg total) by mouth 2 (two) times a day   • glucose blood (OneTouch Verio) test strip Check blood sugars twice daily  Please substitute with appropriate alternative as covered by patient's insurance   Dx: "E11 65   • Insulin Glargine Solostar (Lantus SoloStar) 100 UNIT/ML SOPN Inject 45 units bid   • insulin glulisine (Apidra) 100 units/mL injection Inject 30 units tid with meals   • irbesartan-hydrochlorothiazide (AVALIDE) 150-12 5 MG per tablet TAKE 1 TABLET DAILY   • levothyroxine 150 mcg tablet Take 1 tablet (150 mcg total) by mouth daily   • omeprazole (PriLOSEC) 40 MG capsule TAKE 1 CAPSULE DAILY   • OneTouch Delica Lancets 86Z MISC Check blood sugars twice daily  Please substitute with appropriate alternative as covered by patient's insurance  Dx: E11 65   • verapamil (VERELAN PM) 360 MG 24 hr capsule TAKE 1 CAPSULE DAILY AT BEDTIME   • [DISCONTINUED] zolpidem (AMBIEN) 10 mg tablet Take 1 tablet (10 mg total) by mouth daily at bedtime as needed for sleep for up to 10 days   • aspirin 81 mg chewable tablet Chew 1 tablet (81 mg total) daily   • atorvastatin (LIPITOR) 40 mg tablet Take 1 tablet (40 mg total) by mouth daily with dinner   • clopidogrel (PLAVIX) 75 mg tablet Take 1 tablet (75 mg total) by mouth daily   • escitalopram (LEXAPRO) 10 mg tablet Take 1 tablet (10 mg total) by mouth daily Do not start before March 14, 2023     • mirtazapine (REMERON) 7 5 MG tablet Take 1 tablet (7 5 mg total) by mouth daily at bedtime   • tamsulosin (FLOMAX) 0 4 mg Take 1 capsule (0 4 mg total) by mouth daily with dinner   • [DISCONTINUED] naltrexone (REVIA) 50 mg tablet Take 1 tablet (50 mg total) by mouth daily (Patient not taking: Reported on 3/3/2023)   • [DISCONTINUED] polyethylene glycol (MIRALAX) 17 g packet Take 17 g by mouth daily (Patient not taking: Reported on 3/3/2023)   • [DISCONTINUED] senna (SENOKOT) 8 6 MG tablet Take 1 tablet (8 6 mg total) by mouth daily (Patient not taking: Reported on 3/3/2023)       Objective     /66 (BP Location: Left arm, Patient Position: Sitting, Cuff Size: Large)   Pulse 76   Temp (!) 96 8 °F (36 °C)   Ht 6' 3\" (1 905 m)   SpO2 97%   BMI 33 50 kg/m²     Physical " Exam  Constitutional:       Appearance: He is well-developed  He is obese  HENT:      Head: Normocephalic and atraumatic  Right Ear: Tympanic membrane, ear canal and external ear normal       Left Ear: Tympanic membrane, ear canal and external ear normal       Nose: Nose normal       Mouth/Throat:      Mouth: Mucous membranes are moist       Pharynx: Oropharynx is clear  Eyes:      Pupils: Pupils are equal, round, and reactive to light  Cardiovascular:      Rate and Rhythm: Normal rate and regular rhythm  Heart sounds: Normal heart sounds  Pulmonary:      Effort: Pulmonary effort is normal       Breath sounds: Normal breath sounds  Abdominal:      General: Bowel sounds are normal       Palpations: Abdomen is soft  There is no mass  Tenderness: There is no abdominal tenderness  Musculoskeletal:      Cervical back: Neck supple  Comments: Wheelchair-bound  Absence of right foot   Lymphadenopathy:      Cervical: No cervical adenopathy  Skin:     General: Skin is warm and dry  Neurological:      Mental Status: He is alert  Psychiatric:         Mood and Affect: Mood normal          Behavior: Behavior normal          Thought Content:  Thought content normal        Julianne Mcintyre MD

## 2023-04-24 NOTE — ASSESSMENT & PLAN NOTE
Continue Lantus 45 units twice daily and Apidra 30 units 3 times daily    Lab Results   Component Value Date    HGBA1C 11 1 (H) 11/14/2022

## 2023-04-25 ENCOUNTER — HOSPITAL ENCOUNTER (OUTPATIENT)
Dept: VASCULAR ULTRASOUND | Facility: HOSPITAL | Age: 58
Discharge: HOME | End: 2023-04-25
Attending: SURGERY

## 2023-04-25 ENCOUNTER — TELEPHONE (OUTPATIENT)
Dept: FAMILY MEDICINE CLINIC | Facility: CLINIC | Age: 58
End: 2023-04-25

## 2023-04-25 DIAGNOSIS — I73.9 PAD (PERIPHERAL ARTERY DISEASE) (HCC): ICD-10-CM

## 2023-04-25 DIAGNOSIS — G47.00 INSOMNIA, UNSPECIFIED TYPE: ICD-10-CM

## 2023-04-25 LAB
EST. AVERAGE GLUCOSE BLD GHB EST-MCNC: 180 MG/DL
HBA1C MFR BLD: 7.9 %

## 2023-04-25 RX ORDER — ZOLPIDEM TARTRATE 10 MG/1
10 TABLET ORAL
Qty: 90 TABLET | Refills: 1 | Status: SHIPPED | OUTPATIENT
Start: 2023-04-25 | End: 2023-04-28 | Stop reason: SDUPTHER

## 2023-04-25 NOTE — TELEPHONE ENCOUNTER
Kt Guy called from [address] [place] [address] for script clarification for Zolpidem Rashimary beth Carpenter)- script supposed to be for 10 tabs only (not for 90 with additional refills) - pharmacy cx script and will expect a new one to be sent in with correct dosage :     Sig: Take 1 tablet (10 mg total) by mouth daily at bedtime as needed for sleep for up to 10 days      Please advise

## 2023-04-26 ENCOUNTER — ANESTHESIA EVENT (OUTPATIENT)
Dept: PERIOP | Facility: HOSPITAL | Age: 58
End: 2023-04-26

## 2023-04-26 NOTE — PRE-PROCEDURE INSTRUCTIONS
Pre-Surgery Instructions:   Medication Instructions   • aspirin 81 mg chewable tablet Per MD   • atorvastatin (LIPITOR) 40 mg tablet Take day of surgery  • clopidogrel (PLAVIX) 75 mg tablet Last dose 4-26-23 per MD   • docusate sodium (COLACE) 100 mg capsule Hold day of surgery  • Insulin Glargine Solostar (Lantus SoloStar) 100 UNIT/ML SOPN Take night before surgery   • insulin glulisine (Apidra) 100 units/mL injection Hold day of surgery  • irbesartan-hydrochlorothiazide (AVALIDE) 150-12 5 MG per tablet last dose 4-29-23    • levothyroxine 150 mcg tablet Take day of surgery  • omeprazole (PriLOSEC) 40 MG capsule Take day of surgery  • verapamil (VERELAN PM) 360 MG 24 hr capsule Take day of surgery  • zolpidem (AMBIEN) 10 mg tablet Take night before surgery   Medication instructions for day surgery reviewed  Please use only a sip of water to take your instructed medications  Avoid all over the counter vitamins, supplements and NSAIDS for one week prior to surgery per anesthesia guidelines  Tylenol is ok to take as needed  You will receive a call one business day prior to surgery with an arrival time and hospital directions  If your surgery is scheduled on a Monday, the hospital will be calling you on the Friday prior to your surgery  If you have not heard from anyone by 8pm, please call the hospital supervisor through the hospital  at [phone]  Do not eat or drink anything after midnight the night before your surgery, including candy, mints, lifesavers, or chewing gum  Do not drink alcohol 24hrs before your surgery  Try not to smoke at least 24hrs before your surgery  Follow the pre surgery showering instructions as listed in the [facility] Surgical Experience Booklet” or otherwise provided by your surgeon's office  Do not shave the surgical area 24 hours before surgery   Do not apply any lotions, creams, including makeup, cologne, deodorant, or perfumes after showering on the day of your surgery  No contact lenses, eye make-up, or artificial eyelashes  Remove nail polish, including gel polish, and any artificial, gel, or acrylic nails if possible  Remove all jewelry including rings and body piercing jewelry  Wear causal clothing that is easy to take on and off  Consider your type of surgery  Keep any valuables, jewelry, piercings at home  Please bring any specially ordered equipment (sling, braces) if indicated  Arrange for a responsible person to drive you to and from the hospital on the day of your surgery  Visitor Guidelines discussed  Call the surgeon's office with any new illnesses, exposures, or additional questions prior to surgery  Please reference your [facility] Surgical Experience Booklet” for additional information to prepare for your upcoming surgery

## 2023-04-27 ENCOUNTER — OFFICE VISIT (OUTPATIENT)
Dept: CARDIOLOGY CLINIC | Facility: CLINIC | Age: 58
End: 2023-04-27

## 2023-04-27 ENCOUNTER — OFFICE VISIT (OUTPATIENT)
Dept: VASCULAR SURGERY | Facility: CLINIC | Age: 58
End: 2023-04-27

## 2023-04-27 VITALS
OXYGEN SATURATION: 100 % | HEART RATE: 78 BPM | DIASTOLIC BLOOD PRESSURE: 68 MMHG | SYSTOLIC BLOOD PRESSURE: 110 MMHG | HEIGHT: 76 IN | BODY MASS INDEX: 32.62 KG/M2

## 2023-04-27 VITALS
HEART RATE: 76 BPM | RESPIRATION RATE: 18 BRPM | HEIGHT: 76 IN | DIASTOLIC BLOOD PRESSURE: 66 MMHG | BODY MASS INDEX: 32.62 KG/M2 | OXYGEN SATURATION: 99 % | SYSTOLIC BLOOD PRESSURE: 114 MMHG

## 2023-04-27 DIAGNOSIS — Z01.810 PREOPERATIVE CARDIOVASCULAR EXAMINATION: ICD-10-CM

## 2023-04-27 DIAGNOSIS — S82.131S CLOSED FRACTURE OF MEDIAL PORTION OF RIGHT TIBIAL PLATEAU, SEQUELA: ICD-10-CM

## 2023-04-27 DIAGNOSIS — N18.32 STAGE 3B CHRONIC KIDNEY DISEASE (HCC): ICD-10-CM

## 2023-04-27 DIAGNOSIS — I73.9 PAD (PERIPHERAL ARTERY DISEASE) (HCC): ICD-10-CM

## 2023-04-27 DIAGNOSIS — R94.31 ABNORMAL EKG: ICD-10-CM

## 2023-04-27 DIAGNOSIS — E78.00 PURE HYPERCHOLESTEROLEMIA: ICD-10-CM

## 2023-04-27 DIAGNOSIS — I10 HYPERTENSION, ESSENTIAL: Primary | ICD-10-CM

## 2023-04-27 NOTE — ASSESSMENT & PLAN NOTE
"Hx R Popliteal stent/TMA    Back in Jan had a fall down 12 steps resulting in a tibial plateau fracture  He was undergoing rehab where he was doing reasonably well when he refractured the site  He is scheduled to undergo surgery on Monday  He is referred to the office for \"vascular clearance\"  His lower extremity arterial duplex demonstrated a patent stent  His other imaging studies have been reviewed  He does have significant atherosclerotic occlusive disease with calcified vessels  On exam he has multiphasic PT and DP Doppler signals  No contraindications with proceeding with planned orthopedic procedure from a vascular standpoint      "

## 2023-04-27 NOTE — PROGRESS NOTES
Cardiology Consultation     Kieran Nicole  [id number]  1965  [address] [facility] [address]    The patient is a 68-year-old male who comes in for preop clearance prior to right tibial plateau fracture, fell down some steps 1/2023  He has a past medical history of uncontrolled diabetes, hypertension, hypercholesterolemia and peripheral arterial disease status post previous stenting  EKG is mildly abnormal with normal sinus rhythm with first-degree AV block  LDL this week was 75  Glucose 136  Hemoglobin A1c is down to 7 9 from 11 1  Besides his knee he feels well  Surgery is scheduled for Monday  No chest pain nor sob although he is sedentary, gets around in a wheel chair  He denies history of cad, heart disease  Nuc stress 12/10/2020: SUMMARY:  -  Rest ECG: Normal sinus rhythm  -  Stress results: There was no chest pain during stress  -  ECG conclusions: Arrhythmia during stress: isolated premature ventricular beats  The stress ECG was non-diagnostic   -  Perfusion imaging: There was a moderate-sized, fixed myocardial perfusion defect of the inferoseptal wall  This defect resolved with prone imaging   -  Gated SPECT: The calculated left ventricular ejection fraction was 41 %  Left ventricular ejection fraction was mildly decreased by visual estimate  There was moderately reduced myocardial thickening and motion of the inferoseptal wall  of the left ventricle  IMPRESSIONS: There was a fixed inferoseptal defect that resolved with prone imaging suggesting it was secondary to attenuation artifact  There was no diagnostic evidence of ischemia  Echo 8/31/20: IMPRESSIONS:  No gross evidence of valvular vegetation, howevere this cannot rule out endocarditis   If clinically indicated recommend LACY for further evaluation  SUMMARY  LEFT VENTRICLE:  Systolic function was at the lower limits of normal  LVEF around 50%  Cannot rule mild hypokinesis of the mid inferoseptal wall(s)  There was mild concentric hypertrophy    Doppler parameters were consistent with abnormal left ventricular relaxation (grade 1 diastolic dysfunction           Patient Active Problem List   Diagnosis   • Hypertension, essential   • Gastroesophageal reflux disease without esophagitis   • Hyperlipidemia   • History of nonadherence to medical treatment   • Right-sided tinnitus   • Colon polyps   • Elevated liver enzymes   • Health maintenance examination   • PAD (peripheral artery disease) ([facility])   • Thyroid cancer (HCC)   • Urinary retention   • Hypomagnesemia   • Anemia   • Abnormal EKG   • Preoperative examination   • Insomnia   • Persistent proteinuria   • Acquired absence of right foot (HCC)   • Acquired hypothyroidism   • Physical deconditioning   • Type 2 diabetes mellitus with diabetic peripheral angiopathy without gangrene ([facility] )   • Dupuytren's contracture of right hand   • Stage 3b chronic kidney disease ([facility])   • Hyperkalemia   • Alcoholism ([facility] )   • Closed fracture of right tibial plateau   • Obesity, Class I, BMI 30 0-34 9 (see actual BMI)   • MARKELL (acute kidney injury) ([facility] )   • Chronic kidney disease-mineral and bone disorder   • Acquired genu varum of right lower extremity   • Depression and anxiety   • Closed fracture of medial plateau of right tibia     Past Medical History:   Diagnosis Date   • Broken internal right knee prosthesis ([facility] ) 01/2023   • Chronic kidney disease    • Colon polyp    • Constipation 03/09/2023   • Diabetes mellitus (HCC)    • Disease of thyroid gland    • GERD (gastroesophageal reflux disease)    • HHS vs DKA 11/16/2018   • Hyperlipidemia    • Hypertension    • Thyroid cancer ([facility] )      Social History     Socioeconomic History   • Marital status: Single     Spouse name: Not on file   • Number of children: Not on file   • Years of education: Not on file   • Highest education level: Not on file   Occupational History   • Not on file   Tobacco Use   • Smoking status: Never   • Smokeless tobacco: Never   Vaping Use   • Vaping Use: Never used   Substance and Sexual Activity   • Alcohol use: Not Currently     Comment: quit 1-2023   • Drug use: Not Currently     Types: Marijuana   • Sexual activity: Not Currently     Partners: Female   Other Topics Concern   • Not on file   Social History Narrative    Light caffeine     Wears seatbelt daily    Lives with girlfriend     Social Determinants of Health     Financial Resource Strain: Not on file   Food Insecurity: No Food Insecurity   • Worried About Running Out of Food in the Last Year: Never true   • Ran Out of Food in the Last Year: Never true   Transportation Needs: No Transportation Needs   • Lack of Transportation (Medical): No   • Lack of Transportation (Non-Medical):  No   Physical Activity: Not on file   Stress: Not on file   Social Connections: Not on file   Intimate Partner Violence: Not on file   Housing Stability: Low Risk    • Unable to Pay for Housing in the Last Year: No   • Number of Places Lived in the Last Year: 1   • Unstable Housing in the Last Year: No      Family History   Problem Relation Age of Onset   • Cancer Mother    • Hypertension Father      Past Surgical History:   Procedure Laterality Date   • COLONOSCOPY     • FOOT AMPUTATION Right    • FOOT SURGERY Right 2014   • IR AORTAGRAM WITH RUN-OFF  08/06/2020   • IR TUNNELED CENTRAL LINE PLACEMENT  09/08/2020   • IR TUNNELED CENTRAL LINE REMOVAL  09/28/2020   • MS AMPUTATION FOOT TRANSMETARSAL Right 09/03/2020    Procedure: AMPUTATION TRANSMETATARSAL (TMA);  Surgeon: Donald Cedillo DPM;  Location: [facility];  Service: Podiatry   • MS THYROIDECTOMY TOTAL/COMPLETE N/A 02/03/2021    Procedure: TOTAL THYROIDECTOMY WITH NIMS MONITORING;  Surgeon: Sesar Hilton MD;  Location:  MAIN OR;  Service: ENT   • TOE AMPUTATION Right 08/11/2020    Procedure: AMPUTATION TOE;  Surgeon: Sharon Kelly DPM;  Location: [facility];  Service: Podiatry   • US GUIDED THYROID BIOPSY  07/02/2020       Current Outpatient Medications:   •  aspirin 81 mg chewable tablet, Chew 1 tablet (81 mg total) daily, Disp: 30 tablet, Rfl: 0  •  atorvastatin (LIPITOR) 40 mg tablet, Take 1 tablet (40 mg total) by mouth daily with dinner (Patient taking differently: Take 40 mg by mouth every morning), Disp: 90 tablet, Rfl: 3  •  BD PEN NEEDLE DEV U/F 32G X 4 MM MISC, Inject under the skin 4 (four) times a day, Disp: 400 each, Rfl: 1  •  Blood Glucose Monitoring Suppl (ONE TOUCH ULTRA 2) w/Device KIT, Use 2 (two) times a day, Disp: 1 kit, Rfl: 0  •  Blood Glucose Monitoring Suppl (OneTouch Verio Reflect) w/Device KIT, Check blood sugars twice daily  Please substitute with appropriate alternative as covered by patient's insurance  Dx: E11 65, Disp: 1 kit, Rfl: 0  •  docusate sodium (COLACE) 100 mg capsule, Take 1 capsule (100 mg total) by mouth 2 (two) times a day, Disp: 30 capsule, Rfl: 0  •  glucose blood (OneTouch Verio) test strip, Check blood sugars twice daily  Please substitute with appropriate alternative as covered by patient's insurance   Dx: E11 65, Disp: 200 each, Rfl: 3  •  Insulin Glargine Solostar (Lantus SoloStar) 100 UNIT/ML SOPN, Inject 45 units bid (Patient taking differently: Inject 45 Units under the skin daily at bedtime), Disp: 225 mL, Rfl: 3  •  insulin glulisine (Apidra) 100 units/mL injection, Inject 30 units tid with meals (Patient taking differently: PER SS  tid with meals), Disp: 60 mL, Rfl: 0  •  irbesartan-hydrochlorothiazide (AVALIDE) 150-12 5 MG per tablet, TAKE 1 TABLET DAILY (Patient taking differently: Take 1 tablet by mouth every morning), Disp: 90 tablet, Rfl: 3  •  levothyroxine 150 mcg tablet, Take 1 tablet (150 mcg total) by mouth daily (Patient taking differently: Take 150 mcg by mouth every morning), Disp: 90 tablet, Rfl: 3  •  omeprazole (PriLOSEC) 40 MG capsule, TAKE 1 CAPSULE "DAILY (Patient taking differently: 40 mg every morning), Disp: 90 capsule, Rfl: 3  •  OneTouch Delica Lancets 23H MISC, Check blood sugars twice daily  Please substitute with appropriate alternative as covered by patient's insurance  Dx: E11 65, Disp: 200 each, Rfl: 3  •  verapamil (VERELAN PM) 360 MG 24 hr capsule, TAKE 1 CAPSULE DAILY AT BEDTIME, Disp: 90 capsule, Rfl: 3  •  zolpidem (AMBIEN) 10 mg tablet, Take 1 tablet (10 mg total) by mouth daily at bedtime as needed for sleep, Disp: 90 tablet, Rfl: 1  •  clopidogrel (PLAVIX) 75 mg tablet, Take 1 tablet (75 mg total) by mouth daily (Patient taking differently: Take 75 mg by mouth every morning), Disp: 90 tablet, Rfl: 3  •  escitalopram (LEXAPRO) 10 mg tablet, Take 1 tablet (10 mg total) by mouth daily Do not start before March 14, 2023   (Patient not taking: Reported on 4/26/2023), Disp: 30 tablet, Rfl: 0  •  tamsulosin (FLOMAX) 0 4 mg, Take 1 capsule (0 4 mg total) by mouth daily with dinner (Patient not taking: Reported on 4/26/2023), Disp: 30 capsule, Rfl: 0  Allergies   Allergen Reactions   • Pollen Extract Eye Swelling     Eyes get watery      Vitals:    04/27/23 1429   BP: 114/66   BP Location: Left arm   Patient Position: Sitting   Cuff Size: Standard   Pulse: 76   Resp: 18   SpO2: 99%   Height: 6' 4\" (1 93 m)       Labs:  Appointment on 04/24/2023   Component Date Value   • Sodium 04/24/2023 134 (L)    • Potassium 04/24/2023 4 4    • Chloride 04/24/2023 102    • CO2 04/24/2023 23    • ANION GAP 04/24/2023 9    • BUN 04/24/2023 24    • Creatinine 04/24/2023 1 14    • Glucose, Fasting 04/24/2023 136 (H)    • Calcium 04/24/2023 10 0    • AST 04/24/2023 26    • ALT 04/24/2023 22    • Alkaline Phosphatase 04/24/2023 148 (H)    • Total Protein 04/24/2023 8 5 (H)    • Albumin 04/24/2023 3 7    • Total Bilirubin 04/24/2023 0 61    • eGFR 04/24/2023 69    • WBC 04/24/2023 10 33 (H)    • RBC 04/24/2023 3 72 (L)    • Hemoglobin 04/24/2023 11 4 (L)    • Hematocrit " 04/24/2023 34 8 (L)    • MCV 04/24/2023 94    • MCH 04/24/2023 30 6    • MCHC 04/24/2023 32 8    • RDW 04/24/2023 13 2    • MPV 04/24/2023 11 4    • Platelets 82/68/3246 401 (H)    • nRBC 04/24/2023 0    • Neutrophils Relative 04/24/2023 69    • Immat GRANS % 04/24/2023 0    • Lymphocytes Relative 04/24/2023 18    • Monocytes Relative 04/24/2023 10    • Eosinophils Relative 04/24/2023 2    • Basophils Relative 04/24/2023 1    • Neutrophils Absolute 04/24/2023 7 20    • Immature Grans Absolute 04/24/2023 0 04    • Lymphocytes Absolute 04/24/2023 1 81    • Monocytes Absolute 04/24/2023 1 04    • Eosinophils Absolute 04/24/2023 0 18    • Basophils Absolute 04/24/2023 0 06    • Hemoglobin A1C 04/24/2023 7 9 (H)    • EAG 04/24/2023 180    • Cholesterol 04/24/2023 129    • Triglycerides 04/24/2023 117    • HDL, Direct 04/24/2023 31 (L)    • LDL Calculated 04/24/2023 75    • Non-HDL-Chol (CHOL-HDL) 04/24/2023 98    • Phosphorus 04/24/2023 2 9    • PTH 04/24/2023 61 3    • Vit D, 25-Hydroxy 04/24/2023 16 8 (L)    No results displayed because visit has over 200 results  Imaging: XR knee 1 or 2 vw right    Result Date: 4/14/2023  Narrative: RIGHT KNEE INDICATION:   S82 141D: Displaced bicondylar fracture of right tibia, subsequent encounter for closed fracture with routine healing  COMPARISON:  03/23/2023 VIEWS:  XR KNEE 1 OR 2 VW RIGHT Images: 2 FINDINGS: Comminuted fracture in the right tibial plateau with increased displacement and varus angulation compared to the prior study  There is a joint effusion  No significant degenerative changes  No lytic or blastic osseous lesion  There are atherosclerotic calcifications  A vascular stent is noted  Soft tissues are otherwise unremarkable  Impression: Comminuted fractures of the right tibial plateau with increased displacement and varus angulation   Workstation performed: [id number]     VAS lower limb arterial duplex, limited/unilateral    Result Date: 4/25/2023  Narrative:  [facility] REPORT CLINICAL: Indications: Patient presents for clearance for surgery  Provider wants to confirm adequate blood flow to legs  Patient is asymptomatic at this time  Patient has history of PAD and known hematoma in the right calf  Operative History: 2020-08-11 Right Great toe amputation 2020-08-06 Right Popliteal artery stent Risk Factors The patient has history of HTN and Diabetes (Yes)  He has no history of Hyperlipidemia  Clinical Right Pressure:  147/ mm Hg, Left Pressure:  137/ mm Hg  FINDINGS:  Right                  PSV (cm/s)  EDV  Common Femoral Artery          84    7  Prox Profunda                  97    0  Prox SFA                       73   11  Mid SFA                        84   14  Dist SFA                      100   13  Proximal Pop - Stent           69   18  Distal Pop - Stent             67    7  Tibioperoneal                 166   10  Prox Post Tibial               22    0  Dist  Ant  Tibial              39    0  Dist Peroneal                  38    0     CONCLUSION: RIGHT LOWER LIMB: This resting evaluation shows patent popliteal artery and stent  Ankle/Brachial index was unable to be obtained due to non-compressible vessels (Prior 1 45)  PVR/ PPG tracings are normal  Metatarsal pressure not obtained due to transmetatarsal amputation  Great toe pressure was not obtained due to transmetatarsal amputation  There is an ill-defined, hypoechoic, heterogenous non-vascularized structure noted in the calf muscle compartment consistent with hematoma seen on CT  There are an echogenic structures located in the inguinal region and are consistent with enlarged lymph node and channels  LEFT LOWER LIMB: Limited Ankle/Brachial index was unable to be obtained due to non-compressible vessels (Prior 1 44)  PVR/ PPG tracings are normal  Metatarsal pressure was unable to be obtained due to non-compressible vessels (Prior 186 mmHg)   Great toe pressure of 136 mmHg, within the healing range  Compared to previous study on 8/31/2020, there is no significant change in the disease process  CT lower extremity wo contrast right    Result Date: 4/24/2023  Narrative: CT right knee without IV contrast INDICATION: S82 131A: Displaced fracture of medial condyle of right tibia, initial encounter for closed fracture  COMPARISON: Correlation is made with prior radiograph dated 4/13/2013  TECHNIQUE: CT examination of the above was performed  This examination, like all CT scans performed in the [facility], was performed utilizing techniques to minimize radiation dose exposure, including the use of iterative reconstruction and automated exposure control software  Multiplanar 2D reformatted images were created from the source data  Rad dose  178 mGy-cm FINDINGS: OSSEOUS STRUCTURES:  There is extensive comminuted fracture of the proximal tibia plateau  There is a predominantly oblique fracture which extends from the lateral tibial plateau and across the base of the tibial spines and into the medial tibial plateau  There is centrifugal displacement of multiple fracture fragments  There is approximately 1 5 cm of articular depression involving the medial tibial plateau there is lateral displacement of the tibia with respect to the distal femur of up to 2 cm  The distal femur appears relatively impacted upon the distal tibia as well  There are numerous surrounding fracture fragments including displaced fracture fragments into the joint space  VISUALIZED MUSCULATURE:  There is enlargement and heterogeneous increased density within the medial head of the gastrocnemius muscle compatible with an intramuscular hematoma  SOFT TISSUES:  There is circumferential subcutaneous edema around the knee and lower leg  OTHER PERTINENT FINDINGS:  Moderate-sized joint effusion  Prominent arterial calcifications       Impression: Extensive comminuted displaced fracture of the proximal tibia as described above  Multiple fracture fragments within the joint space  Intramuscular hematoma within the medial head of the gastrocnemius muscle  Moderate-sized joint effusion  Prominent arterial calcifications  Workstation performed: [id number]       Review of Systems:  Review of Systems negative except for HPI  Physical Exam:  Physical Exam GEN: Alert and oriented x 3, in no acute distress  Well appearing and well nourished  HEENT: Sclera anicteric, conjunctivae pink, mucous membranes moist  Oropharynx clear  NECK: Supple, no carotid bruits, no significant JVD  Trachea midline, no thyromegaly  HEART: Regular rhythm, normal S1 and S2, no murmurs, clicks, gallops or rubs  PMI nondisplaced, no thrills  LUNGS: Clear to auscultation bilaterally; no wheezes, rales, or rhonchi  No increased work of breathing or signs of respiratory distress  ABDOMEN: Soft, nontender, nondistended, normoactive bowel sounds  EXTREMITIES: Skin warm and well perfused, no clubbing, cyanosis, or edema  NEURO: No focal findings  Normal speech  Mood and affect normal    SKIN: Normal without suspicious lesions on exposed skin  1  Hypertension, essential        2  PAD (peripheral artery disease) (HCC)        3  Closed fracture of medial portion of right tibial plateau, sequela        4  Stage 3b chronic kidney disease ([address] )        5  Pure hypercholesterolemia        6  Abnormal EKG        7  Preoperative cardiovascular examination        8  Cardiomyopathy    Discussion/Summary: He is here for preop clearance for tibial platuea fracture scheduled in 2 business days  He is asymptomatic from a cv standpoint although sedentary and in a wheel chair  He has risk factors of uncontrolled dm, pad, and possible mildly reduced left ventricular function  He is at moderate to high risk for cardiovascular events given all of the above  He at some point should get an echo and cardiology follow up   They are quite upset because he is at moderate to high risk and wants surgery done on Monday  He doesn't want to get an echo at this time  All questions answered

## 2023-04-27 NOTE — PROGRESS NOTES
"Assessment/Plan:    PAD (peripheral artery disease) ([facility])  Hx R Popliteal stent/TMA    Back in Jan had a fall down 12 steps resulting in a tibial plateau fracture  He was undergoing rehab where he was doing reasonably well when he refractured the site  He is scheduled to undergo surgery on Monday  He is referred to the office for \"vascular clearance\"  His lower extremity arterial duplex demonstrated a patent stent  His other imaging studies have been reviewed  He does have significant atherosclerotic occlusive disease with calcified vessels  On exam he has multiphasic PT and DP Doppler signals  No contraindications with proceeding with planned orthopedic procedure from a vascular standpoint  Diagnoses and all orders for this visit:    PAD (peripheral artery disease) [facility])  -     Ambulatory Referral to Vascular Surgery          Subjective:      Patient ID: Rocio Graves is a 59 y o  male  Patient is new to our office  He was referred here by Dr Hilario(orthopedics) for a pre-op clearance  Patient is scheduled for surgery on 5/1/2023 for a right medial tibial plateau fracture  He had a ALISA done on 4/25/2023  Patient has a history of a right popliteal artery stent placed 3 years ago  Patient is taking ASA 81 mg, Plavix and Atorvastatin  Patient is a non-smoker  Mr Gracie Mcdonough is a pleasant 68-year-old gentleman who was referred to the office for \"vascular clearance\" for upcoming tibial plateau fracture  He has history of peripheral arterial occlusive disease with prior right popliteal artery stent performed at outside facility [facility]) followed by TMA  The following portions of the patient's history were reviewed and updated as appropriate: allergies, current medications, past family history, past medical history, past social history, past surgical history and problem list     Review of Systems   Constitutional: Negative  HENT: Positive for rhinorrhea and sneezing  Eyes: Negative    " "  Respiratory: Positive for cough  Cardiovascular: Positive for leg swelling  Gastrointestinal: Negative  Endocrine: Negative  Genitourinary: Negative  Musculoskeletal:        Pain in right leg  Skin: Negative  Allergic/Immunologic: Positive for environmental allergies  Neurological: Negative  Hematological: Negative  Psychiatric/Behavioral: Negative  I have personally reviewed the ROS entered by MA and agree as documented  Objective:      /68 (BP Location: Left arm, Patient Position: Sitting, Cuff Size: Standard)   Pulse 78   Ht 6' 4\" (1 93 m)   SpO2 100%   BMI 32 62 kg/m²          Physical Exam  Constitutional:       General: He is not in acute distress  Appearance: He is not ill-appearing or diaphoretic  HENT:      Right Ear: External ear normal       Left Ear: External ear normal    Eyes:      General: No scleral icterus  Cardiovascular:      Rate and Rhythm: Normal rate  Pulses:           Dorsalis pedis pulses are detected w/ Doppler on the right side  Posterior tibial pulses are detected w/ Doppler on the right side  Pulmonary:      Effort: Pulmonary effort is normal    Musculoskeletal:      Right lower leg: Edema present  Comments: Right lower leg deformity  Right calf firm from underlying hematoma noted on CT imaging  ROM limited secondary to fracture   Skin:     General: Skin is warm and dry  Neurological:      General: No focal deficit present  Mental Status: He is alert  Psychiatric:         Mood and Affect: Mood normal          Thought Content: Thought content normal          Judgment: Judgment normal        lower extremity arterial duplex:  CONCLUSION:  Impression:  RIGHT LOWER LIMB:  This resting evaluation shows patent popliteal artery and stent  Ankle/Brachial index was unable to be obtained due to non-compressible vessels  (Prior 1 45)    PVR/ PPG tracings are normal   Metatarsal pressure not obtained due to " transmetatarsal amputation  Great toe pressure was not obtained due to transmetatarsal amputation  There is an ill-defined, hypoechoic, heterogenous non-vascularized structure  noted in the calf muscle compartment consistent with hematoma seen on CT  There are an echogenic structures located in the inguinal region and are  consistent with enlarged lymph node and channels

## 2023-04-28 ENCOUNTER — TELEPHONE (OUTPATIENT)
Dept: FAMILY MEDICINE CLINIC | Facility: CLINIC | Age: 58
End: 2023-04-28

## 2023-04-28 DIAGNOSIS — G47.00 INSOMNIA, UNSPECIFIED TYPE: ICD-10-CM

## 2023-04-28 NOTE — TELEPHONE ENCOUNTER
Spoke with Gail Aguilera and read her Dr Larson's note - notified verbally - yes, this will work as per Gail Aguilera - a verbal notes are okay as per Anesthesiology  Dr Larson notified

## 2023-04-28 NOTE — TELEPHONE ENCOUNTER
I will not do an addendum to the note because I did not see him, the best I can do is put a letter in his chart restate that he is at moderate to high risk per the cardiologist   Will this work?

## 2023-04-28 NOTE — TELEPHONE ENCOUNTER
Ludy Rodriguez (Surgical Coordinator) called from [facility] - aware Dr Livia Harris is out of the office today until 05/01/2023 :  Per office's request : sending this message as a High Priority Message - sx date 05/01/2023  Surgical coordinator is asking PCP to review pts chart - Dr Prather requested pt to get a Cardiac Clearance from [address] [place] [address] - pt saw his Cardiologist yesterday  Alireza Cabello is asking to look at the notes - need an addendum in Dr Prather's office note from 04/24/2023 - either pt is cleared or not cleared for proposed sx on Monday 05/01/2023  #[phone]    Dr Larson - would you mind please to review?     Please advise

## 2023-05-01 ENCOUNTER — APPOINTMENT (OUTPATIENT)
Dept: RADIOLOGY | Facility: HOSPITAL | Age: 58
End: 2023-05-01

## 2023-05-01 ENCOUNTER — ANESTHESIA (OUTPATIENT)
Dept: PERIOP | Facility: HOSPITAL | Age: 58
End: 2023-05-01

## 2023-05-01 ENCOUNTER — HOSPITAL ENCOUNTER (INPATIENT)
Facility: HOSPITAL | Age: 58
LOS: 9 days | End: 2023-05-10
Attending: ORTHOPAEDIC SURGERY | Admitting: ORTHOPAEDIC SURGERY

## 2023-05-01 DIAGNOSIS — K21.9 GASTROESOPHAGEAL REFLUX DISEASE WITHOUT ESOPHAGITIS: ICD-10-CM

## 2023-05-01 DIAGNOSIS — S82.131S CLOSED FRACTURE OF MEDIAL PORTION OF RIGHT TIBIAL PLATEAU, SEQUELA: Primary | ICD-10-CM

## 2023-05-01 DIAGNOSIS — N17.9 AKI (ACUTE KIDNEY INJURY) (HCC): ICD-10-CM

## 2023-05-01 DIAGNOSIS — S82.131A RIGHT MEDIAL TIBIAL PLATEAU FRACTURE, CLOSED, INITIAL ENCOUNTER: ICD-10-CM

## 2023-05-01 DIAGNOSIS — I73.9 PAD (PERIPHERAL ARTERY DISEASE) (HCC): ICD-10-CM

## 2023-05-01 DIAGNOSIS — E03.9 ACQUIRED HYPOTHYROIDISM: ICD-10-CM

## 2023-05-01 DIAGNOSIS — G47.00 INSOMNIA, UNSPECIFIED TYPE: ICD-10-CM

## 2023-05-01 DIAGNOSIS — Z79.4 TYPE 2 DIABETES MELLITUS WITH DIABETIC PERIPHERAL ANGIOPATHY WITHOUT GANGRENE, WITH LONG-TERM CURRENT USE OF INSULIN (HCC): ICD-10-CM

## 2023-05-01 DIAGNOSIS — R33.9 URINARY RETENTION: ICD-10-CM

## 2023-05-01 DIAGNOSIS — I10 HYPERTENSION, ESSENTIAL: ICD-10-CM

## 2023-05-01 DIAGNOSIS — E11.51 TYPE 2 DIABETES MELLITUS WITH DIABETIC PERIPHERAL ANGIOPATHY WITHOUT GANGRENE, WITH LONG-TERM CURRENT USE OF INSULIN (HCC): ICD-10-CM

## 2023-05-01 DIAGNOSIS — N18.32 STAGE 3B CHRONIC KIDNEY DISEASE (HCC): ICD-10-CM

## 2023-05-01 PROBLEM — G47.33 OSA (OBSTRUCTIVE SLEEP APNEA): Status: ACTIVE | Noted: 2023-05-01

## 2023-05-01 LAB
BASE EXCESS BLDA CALC-SCNC: -1 MMOL/L (ref -2–3)
CA-I BLD-SCNC: 1.2 MMOL/L (ref 1.12–1.32)
GLUCOSE SERPL-MCNC: 109 MG/DL (ref 65–140)
GLUCOSE SERPL-MCNC: 109 MG/DL (ref 65–140)
GLUCOSE SERPL-MCNC: 110 MG/DL (ref 65–140)
GLUCOSE SERPL-MCNC: 115 MG/DL (ref 65–140)
GLUCOSE SERPL-MCNC: 154 MG/DL (ref 65–140)
HCO3 BLDA-SCNC: 24 MMOL/L (ref 24–30)
HCT VFR BLD CALC: 30 % (ref 36.5–49.3)
HGB BLDA-MCNC: 10.2 G/DL (ref 12–17)
PCO2 BLD: 25 MMOL/L (ref 21–32)
PCO2 BLD: 42.1 MM HG (ref 42–50)
PH BLD: 7.37 [PH] (ref 7.3–7.4)
PO2 BLD: 45 MM HG (ref 35–45)
POTASSIUM BLD-SCNC: 4.2 MMOL/L (ref 3.5–5.3)
SAO2 % BLD FROM PO2: 79 % (ref 60–85)
SODIUM BLD-SCNC: 138 MMOL/L (ref 136–145)
SPECIMEN SOURCE: ABNORMAL

## 2023-05-01 PROCEDURE — 0QUG0KZ SUPPLEMENT RIGHT TIBIA WITH NONAUTOLOGOUS TISSUE SUBSTITUTE, OPEN APPROACH: ICD-10-PCS | Performed by: ORTHOPAEDIC SURGERY

## 2023-05-01 PROCEDURE — 0QSG04Z REPOSITION RIGHT TIBIA WITH INTERNAL FIXATION DEVICE, OPEN APPROACH: ICD-10-PCS | Performed by: ORTHOPAEDIC SURGERY

## 2023-05-01 DEVICE — 3.5MM VARIABLE ANGLE LOCKING SCREW/SLF-TPNG/STRDRV/80MM: Type: IMPLANTABLE DEVICE | Site: TIBIA | Status: FUNCTIONAL

## 2023-05-01 DEVICE — 3.5MM VARIABLE ANGLE LOCKING SCREW/SLF-TPNG/STRDRV/70MM: Type: IMPLANTABLE DEVICE | Site: TIBIA | Status: FUNCTIONAL

## 2023-05-01 DEVICE — 3.5MM CORTEX SCREW SELF-TAPPING 55MM: Type: IMPLANTABLE DEVICE | Site: TIBIA | Status: FUNCTIONAL

## 2023-05-01 DEVICE — GRAFT BONE VIVIGEN 5ML: Type: IMPLANTABLE DEVICE | Site: KNEE | Status: FUNCTIONAL

## 2023-05-01 DEVICE — IMPLANTABLE DEVICE: Type: IMPLANTABLE DEVICE | Site: KNEE | Status: FUNCTIONAL

## 2023-05-01 DEVICE — NORIAN DRILLABLE INJECT 10CC-STERILE
Type: IMPLANTABLE DEVICE | Site: KNEE | Status: FUNCTIONAL
Brand: NORIAN

## 2023-05-01 DEVICE — 3.5MM PELVIC CORTEX SCREW SELF-TAPPING 90MM: Type: IMPLANTABLE DEVICE | Site: TIBIA | Status: FUNCTIONAL

## 2023-05-01 DEVICE — 5.0MM SELF-DRILLING SCHANZ SCREW 60MM THRD/150MM: Type: IMPLANTABLE DEVICE | Site: TIBIA | Status: FUNCTIONAL

## 2023-05-01 DEVICE — GRAFT BONE CANCELLOUS CHIPS 30ML: Type: IMPLANTABLE DEVICE | Site: KNEE | Status: FUNCTIONAL

## 2023-05-01 DEVICE — 3.5MM VARIABLE ANGLE LOCKING SCREW/SLF-TPNG/STRDRV/44MM: Type: IMPLANTABLE DEVICE | Site: TIBIA | Status: FUNCTIONAL

## 2023-05-01 DEVICE — 3.5MM LCP® MEDIAL PROXIMAL TIBIA PLATE 4H/RIGHT 93MM
Type: IMPLANTABLE DEVICE | Site: TIBIA | Status: FUNCTIONAL
Brand: LCP

## 2023-05-01 DEVICE — 3.5MM VARIABLE ANGLE LOCKING SCREW/SLF-TPNG/STRDRV/50MM: Type: IMPLANTABLE DEVICE | Site: TIBIA | Status: FUNCTIONAL

## 2023-05-01 DEVICE — 3.5MM VARIABLE ANGLE LOCKING SCREW/SLF-TPNG/STRDRV/85MM: Type: IMPLANTABLE DEVICE | Site: TIBIA | Status: FUNCTIONAL

## 2023-05-01 DEVICE — 3.5MM VARIABLE ANGLE LOCKING SCREW/SLF-TPNG/STRDRV/42MM: Type: IMPLANTABLE DEVICE | Site: TIBIA | Status: FUNCTIONAL

## 2023-05-01 DEVICE — 3.5MM CORTEX SCREW SELF-TAPPING 40MM: Type: IMPLANTABLE DEVICE | Site: TIBIA | Status: FUNCTIONAL

## 2023-05-01 DEVICE — 3.5MM CORTEX SCREW SELF-TAPPING 38MM: Type: IMPLANTABLE DEVICE | Site: TIBIA | Status: FUNCTIONAL

## 2023-05-01 DEVICE — BONE ILIAC CREST WEDGE 19-21 MM: Type: IMPLANTABLE DEVICE | Site: KNEE | Status: FUNCTIONAL

## 2023-05-01 DEVICE — 3.5MM VARIABLE ANGLE LOCKING SCREW/SLF-TPNG/STRDRV/38MM: Type: IMPLANTABLE DEVICE | Site: TIBIA | Status: FUNCTIONAL

## 2023-05-01 DEVICE — 3.5MM VARIABLE ANGLE LOCKING SCREW/SLF-TPNG/STRDRV/65MM: Type: IMPLANTABLE DEVICE | Site: TIBIA | Status: FUNCTIONAL

## 2023-05-01 DEVICE — 3.5MM PELVIC CORTEX SCREW SELF-TAPPING 85MM: Type: IMPLANTABLE DEVICE | Site: TIBIA | Status: FUNCTIONAL

## 2023-05-01 RX ORDER — PROPOFOL 10 MG/ML
INJECTION, EMULSION INTRAVENOUS AS NEEDED
Status: DISCONTINUED | OUTPATIENT
Start: 2023-05-01 | End: 2023-05-01

## 2023-05-01 RX ORDER — INSULIN GLARGINE 100 [IU]/ML
25 INJECTION, SOLUTION SUBCUTANEOUS
Status: DISCONTINUED | OUTPATIENT
Start: 2023-05-01 | End: 2023-05-05

## 2023-05-01 RX ORDER — VANCOMYCIN HYDROCHLORIDE 1 G/20ML
INJECTION, POWDER, LYOPHILIZED, FOR SOLUTION INTRAVENOUS AS NEEDED
Status: DISCONTINUED | OUTPATIENT
Start: 2023-05-01 | End: 2023-05-01 | Stop reason: HOSPADM

## 2023-05-01 RX ORDER — SODIUM CHLORIDE, SODIUM LACTATE, POTASSIUM CHLORIDE, CALCIUM CHLORIDE 600; 310; 30; 20 MG/100ML; MG/100ML; MG/100ML; MG/100ML
100 INJECTION, SOLUTION INTRAVENOUS CONTINUOUS
Status: DISCONTINUED | OUTPATIENT
Start: 2023-05-01 | End: 2023-05-03

## 2023-05-01 RX ORDER — ATORVASTATIN CALCIUM 40 MG/1
40 TABLET, FILM COATED ORAL EVERY MORNING
Status: DISCONTINUED | OUTPATIENT
Start: 2023-05-02 | End: 2023-05-10 | Stop reason: HOSPADM

## 2023-05-01 RX ORDER — SODIUM CHLORIDE, SODIUM LACTATE, POTASSIUM CHLORIDE, CALCIUM CHLORIDE 600; 310; 30; 20 MG/100ML; MG/100ML; MG/100ML; MG/100ML
INJECTION, SOLUTION INTRAVENOUS CONTINUOUS PRN
Status: DISCONTINUED | OUTPATIENT
Start: 2023-05-01 | End: 2023-05-01

## 2023-05-01 RX ORDER — TAMSULOSIN HYDROCHLORIDE 0.4 MG/1
0.4 CAPSULE ORAL
Status: DISCONTINUED | OUTPATIENT
Start: 2023-05-01 | End: 2023-05-10 | Stop reason: HOSPADM

## 2023-05-01 RX ORDER — INSULIN LISPRO 100 [IU]/ML
15 INJECTION, SOLUTION INTRAVENOUS; SUBCUTANEOUS
Status: DISCONTINUED | OUTPATIENT
Start: 2023-05-02 | End: 2023-05-05

## 2023-05-01 RX ORDER — EPHEDRINE SULFATE 50 MG/ML
INJECTION INTRAVENOUS AS NEEDED
Status: DISCONTINUED | OUTPATIENT
Start: 2023-05-01 | End: 2023-05-01

## 2023-05-01 RX ORDER — OXYCODONE HYDROCHLORIDE 10 MG/1
10 TABLET ORAL EVERY 4 HOURS PRN
Status: DISCONTINUED | OUTPATIENT
Start: 2023-05-01 | End: 2023-05-01

## 2023-05-01 RX ORDER — ONDANSETRON 2 MG/ML
4 INJECTION INTRAMUSCULAR; INTRAVENOUS ONCE AS NEEDED
Status: COMPLETED | OUTPATIENT
Start: 2023-05-01 | End: 2023-05-01

## 2023-05-01 RX ORDER — CEFAZOLIN SODIUM 2 G/50ML
2000 SOLUTION INTRAVENOUS EVERY 8 HOURS
Status: COMPLETED | OUTPATIENT
Start: 2023-05-01 | End: 2023-05-02

## 2023-05-01 RX ORDER — METOCLOPRAMIDE HYDROCHLORIDE 5 MG/ML
INJECTION INTRAMUSCULAR; INTRAVENOUS AS NEEDED
Status: DISCONTINUED | OUTPATIENT
Start: 2023-05-01 | End: 2023-05-01

## 2023-05-01 RX ORDER — HYDROCHLOROTHIAZIDE 12.5 MG/1
12.5 TABLET ORAL DAILY
Status: DISCONTINUED | OUTPATIENT
Start: 2023-05-02 | End: 2023-05-02

## 2023-05-01 RX ORDER — HYDROMORPHONE HYDROCHLORIDE 4 MG/1
4 TABLET ORAL EVERY 6 HOURS PRN
Status: DISCONTINUED | OUTPATIENT
Start: 2023-05-01 | End: 2023-05-10 | Stop reason: HOSPADM

## 2023-05-01 RX ORDER — ENOXAPARIN SODIUM 100 MG/ML
40 INJECTION SUBCUTANEOUS DAILY
Status: DISCONTINUED | OUTPATIENT
Start: 2023-05-02 | End: 2023-05-10 | Stop reason: HOSPADM

## 2023-05-01 RX ORDER — HYDROMORPHONE HCL/PF 1 MG/ML
0.5 SYRINGE (ML) INJECTION
Status: DISCONTINUED | OUTPATIENT
Start: 2023-05-01 | End: 2023-05-01 | Stop reason: HOSPADM

## 2023-05-01 RX ORDER — INSULIN LISPRO 100 [IU]/ML
30 INJECTION, SOLUTION INTRAVENOUS; SUBCUTANEOUS
Status: DISCONTINUED | OUTPATIENT
Start: 2023-05-01 | End: 2023-05-01

## 2023-05-01 RX ORDER — MAGNESIUM HYDROXIDE 1200 MG/15ML
LIQUID ORAL AS NEEDED
Status: DISCONTINUED | OUTPATIENT
Start: 2023-05-01 | End: 2023-05-01 | Stop reason: HOSPADM

## 2023-05-01 RX ORDER — NEOSTIGMINE METHYLSULFATE 1 MG/ML
INJECTION INTRAVENOUS AS NEEDED
Status: DISCONTINUED | OUTPATIENT
Start: 2023-05-01 | End: 2023-05-01

## 2023-05-01 RX ORDER — MIDAZOLAM HYDROCHLORIDE 2 MG/2ML
INJECTION, SOLUTION INTRAMUSCULAR; INTRAVENOUS
Status: COMPLETED | OUTPATIENT
Start: 2023-05-01 | End: 2023-05-01

## 2023-05-01 RX ORDER — DOCUSATE SODIUM 100 MG/1
100 CAPSULE, LIQUID FILLED ORAL 2 TIMES DAILY
Status: DISCONTINUED | OUTPATIENT
Start: 2023-05-01 | End: 2023-05-10 | Stop reason: HOSPADM

## 2023-05-01 RX ORDER — OXYCODONE HYDROCHLORIDE 5 MG/1
5 TABLET ORAL EVERY 4 HOURS PRN
Status: DISCONTINUED | OUTPATIENT
Start: 2023-05-01 | End: 2023-05-01

## 2023-05-01 RX ORDER — ROPIVACAINE HYDROCHLORIDE 5 MG/ML
INJECTION, SOLUTION EPIDURAL; INFILTRATION; PERINEURAL
Status: COMPLETED | OUTPATIENT
Start: 2023-05-01 | End: 2023-05-01

## 2023-05-01 RX ORDER — ROCURONIUM BROMIDE 10 MG/ML
INJECTION, SOLUTION INTRAVENOUS AS NEEDED
Status: DISCONTINUED | OUTPATIENT
Start: 2023-05-01 | End: 2023-05-01

## 2023-05-01 RX ORDER — INSULIN LISPRO 100 [IU]/ML
2-12 INJECTION, SOLUTION INTRAVENOUS; SUBCUTANEOUS
Status: DISCONTINUED | OUTPATIENT
Start: 2023-05-01 | End: 2023-05-10 | Stop reason: HOSPADM

## 2023-05-01 RX ORDER — ZOLPIDEM TARTRATE 10 MG/1
10 TABLET ORAL
Qty: 90 TABLET | Refills: 0 | Status: ON HOLD | OUTPATIENT
Start: 2023-05-01 | End: 2023-10-28

## 2023-05-01 RX ORDER — HYDROMORPHONE HYDROCHLORIDE 2 MG/1
2 TABLET ORAL EVERY 6 HOURS PRN
Status: DISCONTINUED | OUTPATIENT
Start: 2023-05-01 | End: 2023-05-10 | Stop reason: HOSPADM

## 2023-05-01 RX ORDER — ASPIRIN 81 MG/1
81 TABLET, CHEWABLE ORAL DAILY
Status: DISCONTINUED | OUTPATIENT
Start: 2023-05-02 | End: 2023-05-10 | Stop reason: HOSPADM

## 2023-05-01 RX ORDER — GLYCOPYRROLATE 0.2 MG/ML
INJECTION INTRAMUSCULAR; INTRAVENOUS AS NEEDED
Status: DISCONTINUED | OUTPATIENT
Start: 2023-05-01 | End: 2023-05-01

## 2023-05-01 RX ORDER — LEVOTHYROXINE SODIUM 0.15 MG/1
150 TABLET ORAL EVERY MORNING
Status: DISCONTINUED | OUTPATIENT
Start: 2023-05-02 | End: 2023-05-10 | Stop reason: HOSPADM

## 2023-05-01 RX ORDER — FENTANYL CITRATE/PF 50 MCG/ML
50 SYRINGE (ML) INJECTION
Status: DISCONTINUED | OUTPATIENT
Start: 2023-05-01 | End: 2023-05-01 | Stop reason: HOSPADM

## 2023-05-01 RX ORDER — ACETAMINOPHEN 325 MG/1
650 TABLET ORAL EVERY 8 HOURS PRN
Status: DISCONTINUED | OUTPATIENT
Start: 2023-05-01 | End: 2023-05-10 | Stop reason: HOSPADM

## 2023-05-01 RX ORDER — CEFAZOLIN SODIUM 2 G/50ML
2000 SOLUTION INTRAVENOUS ONCE
Status: COMPLETED | OUTPATIENT
Start: 2023-05-01 | End: 2023-05-01

## 2023-05-01 RX ORDER — LOSARTAN POTASSIUM 50 MG/1
50 TABLET ORAL DAILY
Status: DISCONTINUED | OUTPATIENT
Start: 2023-05-02 | End: 2023-05-02

## 2023-05-01 RX ORDER — HYDROMORPHONE HCL/PF 1 MG/ML
0.5 SYRINGE (ML) INJECTION EVERY 4 HOURS PRN
Status: DISCONTINUED | OUTPATIENT
Start: 2023-05-01 | End: 2023-05-01

## 2023-05-01 RX ORDER — ESCITALOPRAM OXALATE 10 MG/1
10 TABLET ORAL DAILY
Status: DISCONTINUED | OUTPATIENT
Start: 2023-05-02 | End: 2023-05-10 | Stop reason: HOSPADM

## 2023-05-01 RX ORDER — CLOPIDOGREL BISULFATE 75 MG/1
75 TABLET ORAL EVERY MORNING
Status: DISCONTINUED | OUTPATIENT
Start: 2023-05-02 | End: 2023-05-10 | Stop reason: HOSPADM

## 2023-05-01 RX ORDER — HYDROMORPHONE HCL IN WATER/PF 6 MG/30 ML
0.2 PATIENT CONTROLLED ANALGESIA SYRINGE INTRAVENOUS
Status: DISCONTINUED | OUTPATIENT
Start: 2023-05-01 | End: 2023-05-10 | Stop reason: HOSPADM

## 2023-05-01 RX ORDER — LIDOCAINE HYDROCHLORIDE 10 MG/ML
INJECTION, SOLUTION EPIDURAL; INFILTRATION; INTRACAUDAL; PERINEURAL AS NEEDED
Status: DISCONTINUED | OUTPATIENT
Start: 2023-05-01 | End: 2023-05-01

## 2023-05-01 RX ORDER — ONDANSETRON 2 MG/ML
INJECTION INTRAMUSCULAR; INTRAVENOUS AS NEEDED
Status: DISCONTINUED | OUTPATIENT
Start: 2023-05-01 | End: 2023-05-01

## 2023-05-01 RX ORDER — INSULIN GLARGINE 100 [IU]/ML
45 INJECTION, SOLUTION SUBCUTANEOUS
Status: DISCONTINUED | OUTPATIENT
Start: 2023-05-01 | End: 2023-05-01

## 2023-05-01 RX ORDER — HYDROMORPHONE HCL 110MG/55ML
PATIENT CONTROLLED ANALGESIA SYRINGE INTRAVENOUS AS NEEDED
Status: DISCONTINUED | OUTPATIENT
Start: 2023-05-01 | End: 2023-05-01

## 2023-05-01 RX ORDER — FENTANYL CITRATE 50 UG/ML
INJECTION, SOLUTION INTRAMUSCULAR; INTRAVENOUS
Status: COMPLETED | OUTPATIENT
Start: 2023-05-01 | End: 2023-05-01

## 2023-05-01 RX ORDER — PANTOPRAZOLE SODIUM 40 MG/1
40 TABLET, DELAYED RELEASE ORAL
Status: DISCONTINUED | OUTPATIENT
Start: 2023-05-02 | End: 2023-05-10 | Stop reason: HOSPADM

## 2023-05-01 RX ORDER — ZOLPIDEM TARTRATE 5 MG/1
10 TABLET ORAL
Status: DISCONTINUED | OUTPATIENT
Start: 2023-05-01 | End: 2023-05-10 | Stop reason: HOSPADM

## 2023-05-01 RX ORDER — CHLORHEXIDINE GLUCONATE 0.12 MG/ML
15 RINSE ORAL ONCE
Status: COMPLETED | OUTPATIENT
Start: 2023-05-01 | End: 2023-05-01

## 2023-05-01 RX ORDER — LIDOCAINE HYDROCHLORIDE 10 MG/ML
INJECTION, SOLUTION EPIDURAL; INFILTRATION; INTRACAUDAL; PERINEURAL
Status: COMPLETED | OUTPATIENT
Start: 2023-05-01 | End: 2023-05-01

## 2023-05-01 RX ADMIN — GLYCOPYRROLATE 0.4 MG: 0.2 INJECTION INTRAMUSCULAR; INTRAVENOUS at 18:00

## 2023-05-01 RX ADMIN — ONDANSETRON 4 MG: 2 INJECTION INTRAMUSCULAR; INTRAVENOUS at 17:18

## 2023-05-01 RX ADMIN — HYDROMORPHONE HYDROCHLORIDE 4 MG: 4 TABLET ORAL at 20:04

## 2023-05-01 RX ADMIN — LIDOCAINE HYDROCHLORIDE 40 MG: 10 INJECTION, SOLUTION EPIDURAL; INFILTRATION; INTRACAUDAL; PERINEURAL at 13:13

## 2023-05-01 RX ADMIN — FENTANYL CITRATE 50 MCG: 50 INJECTION INTRAMUSCULAR; INTRAVENOUS at 11:35

## 2023-05-01 RX ADMIN — TAMSULOSIN HYDROCHLORIDE 0.4 MG: 0.4 CAPSULE ORAL at 21:20

## 2023-05-01 RX ADMIN — MIDAZOLAM HYDROCHLORIDE 2 MG: 1 INJECTION, SOLUTION INTRAMUSCULAR; INTRAVENOUS at 11:35

## 2023-05-01 RX ADMIN — SODIUM CHLORIDE, SODIUM LACTATE, POTASSIUM CHLORIDE, AND CALCIUM CHLORIDE: .6; .31; .03; .02 INJECTION, SOLUTION INTRAVENOUS at 16:53

## 2023-05-01 RX ADMIN — SODIUM CHLORIDE, SODIUM LACTATE, POTASSIUM CHLORIDE, AND CALCIUM CHLORIDE 100 ML/HR: .6; .31; .03; .02 INJECTION, SOLUTION INTRAVENOUS at 18:45

## 2023-05-01 RX ADMIN — FENTANYL CITRATE 50 MCG: 50 INJECTION INTRAMUSCULAR; INTRAVENOUS at 13:13

## 2023-05-01 RX ADMIN — HYDROMORPHONE HYDROCHLORIDE 0.2 MG: 0.2 INJECTION, SOLUTION INTRAMUSCULAR; INTRAVENOUS; SUBCUTANEOUS at 21:26

## 2023-05-01 RX ADMIN — ROCURONIUM BROMIDE 20 MG: 10 INJECTION, SOLUTION INTRAVENOUS at 14:17

## 2023-05-01 RX ADMIN — SODIUM CHLORIDE, SODIUM LACTATE, POTASSIUM CHLORIDE, AND CALCIUM CHLORIDE: .6; .31; .03; .02 INJECTION, SOLUTION INTRAVENOUS at 13:06

## 2023-05-01 RX ADMIN — DOCUSATE SODIUM 100 MG: 100 CAPSULE, LIQUID FILLED ORAL at 21:20

## 2023-05-01 RX ADMIN — ROCURONIUM BROMIDE 10 MG: 10 INJECTION, SOLUTION INTRAVENOUS at 16:09

## 2023-05-01 RX ADMIN — METOCLOPRAMIDE 5 MG: 5 INJECTION, SOLUTION INTRAMUSCULAR; INTRAVENOUS at 13:32

## 2023-05-01 RX ADMIN — PHENYLEPHRINE HYDROCHLORIDE 10 MCG/MIN: 10 INJECTION INTRAVENOUS at 15:20

## 2023-05-01 RX ADMIN — LIDOCAINE HYDROCHLORIDE 3 ML: 10 INJECTION, SOLUTION EPIDURAL; INFILTRATION; INTRACAUDAL at 11:35

## 2023-05-01 RX ADMIN — EPHEDRINE SULFATE 5 MG: 50 INJECTION, SOLUTION INTRAVENOUS at 13:30

## 2023-05-01 RX ADMIN — HYDROMORPHONE HYDROCHLORIDE 0.25 MG: 2 INJECTION, SOLUTION INTRAMUSCULAR; INTRAVENOUS; SUBCUTANEOUS at 17:33

## 2023-05-01 RX ADMIN — CEFAZOLIN SODIUM 2000 MG: 2 SOLUTION INTRAVENOUS at 21:20

## 2023-05-01 RX ADMIN — PROPOFOL 250 MG: 10 INJECTION, EMULSION INTRAVENOUS at 13:13

## 2023-05-01 RX ADMIN — EPHEDRINE SULFATE 5 MG: 50 INJECTION, SOLUTION INTRAVENOUS at 15:25

## 2023-05-01 RX ADMIN — CEFAZOLIN SODIUM 2000 MG: 2 SOLUTION INTRAVENOUS at 13:23

## 2023-05-01 RX ADMIN — ROPIVACAINE HYDROCHLORIDE 30 ML: 5 INJECTION, SOLUTION EPIDURAL; INFILTRATION; PERINEURAL at 11:34

## 2023-05-01 RX ADMIN — EPHEDRINE SULFATE 5 MG: 50 INJECTION, SOLUTION INTRAVENOUS at 16:22

## 2023-05-01 RX ADMIN — SODIUM CHLORIDE, SODIUM LACTATE, POTASSIUM CHLORIDE, AND CALCIUM CHLORIDE: .6; .31; .03; .02 INJECTION, SOLUTION INTRAVENOUS at 14:00

## 2023-05-01 RX ADMIN — MORPHINE SULFATE 2 MG: 2 INJECTION, SOLUTION INTRAMUSCULAR; INTRAVENOUS at 22:44

## 2023-05-01 RX ADMIN — NEOSTIGMINE METHYLSULFATE 3 MG: 1 INJECTION INTRAVENOUS at 18:00

## 2023-05-01 RX ADMIN — HYDROMORPHONE HYDROCHLORIDE 0.5 MG: 2 INJECTION, SOLUTION INTRAMUSCULAR; INTRAVENOUS; SUBCUTANEOUS at 15:11

## 2023-05-01 RX ADMIN — INSULIN GLARGINE 25 UNITS: 100 INJECTION, SOLUTION SUBCUTANEOUS at 21:19

## 2023-05-01 RX ADMIN — HYDROMORPHONE HYDROCHLORIDE 0.25 MG: 2 INJECTION, SOLUTION INTRAMUSCULAR; INTRAVENOUS; SUBCUTANEOUS at 17:26

## 2023-05-01 RX ADMIN — ROCURONIUM BROMIDE 20 MG: 10 INJECTION, SOLUTION INTRAVENOUS at 15:06

## 2023-05-01 RX ADMIN — CHLORHEXIDINE GLUCONATE 0.12% ORAL RINSE 15 ML: 1.2 LIQUID ORAL at 10:38

## 2023-05-01 RX ADMIN — ONDANSETRON 4 MG: 2 INJECTION INTRAMUSCULAR; INTRAVENOUS at 18:25

## 2023-05-01 RX ADMIN — ROCURONIUM BROMIDE 50 MG: 10 INJECTION, SOLUTION INTRAVENOUS at 13:13

## 2023-05-01 NOTE — ASSESSMENT & PLAN NOTE
Lab Results   Component Value Date    HGBA1C 7 9 (H) 04/24/2023       Recent Labs     05/01/23  1034 05/01/23  1814 05/01/23  1853   POCGLU 154* 110 115       Blood Sugar Average: Last 72 hrs:  (P) 170 [id number]     · A1c last 4/2023 7 9  · Reports that he is taking Lantus 45 units at bedtime, Apidra 30 units 3 times daily with meals  · Currently on lantus 25mg HS and 15-15-15 + SSI    Will increase lantus to 30U

## 2023-05-01 NOTE — ANESTHESIA PROCEDURE NOTES
Peripheral Block    Patient location during procedure: holding area  Start time: 5/1/2023 11:32 AM  Reason for block: at surgeon's request and post-op pain management  Staffing  Performed: Anesthesiologist   Anesthesiologist: Erin Conner MD  Preanesthetic Checklist  Completed: patient identified, IV checked, site marked, risks and benefits discussed, surgical consent, monitors and equipment checked, pre-op evaluation and timeout performed  Peripheral Block  Patient position: supine  Prep: ChloraPrep  Patient monitoring: continuous pulse ox, frequent blood pressure checks, cardiac monitor and heart rate  Block type: adductor canal block  Laterality: right  Injection technique: single-shot  Procedures: ultrasound guided, Ultrasound guidance required for the procedure to increase accuracy and safety of medication placement and decrease risk of complications    Ultrasound permanent image savedropivacaine (NAROPIN) 0 5 % - Perineural   30 mL - 5/1/2023 11:34:00 AM  midazolam (VERSED) 2 mg/2 mL - Intravenous   2 mg - 5/1/2023 11:35:00 AM  fentaNYL 50 mcg/mL - Intravenous   50 mcg - 5/1/2023 11:35:00 AM  lidocaine (PF) (XYLOCAINE-MPF) 1 % - Infiltration   3 mL - 5/1/2023 11:35:00 AM  Needle  Needle type: Stimuplex   Needle gauge: 20 G  Needle length: 4 in  Needle localization: ultrasound guidance  Needle insertion depth: 3 cm  Test dose: negative  Assessment  Injection assessment: incremental injection and local visualized surrounding nerve on ultrasound  Paresthesia pain: none  Heart rate change: no  Slow fractionated injection: yes  Post-procedure:  site cleaned  patient tolerated the procedure well with no immediate complications

## 2023-05-01 NOTE — ASSESSMENT & PLAN NOTE
· Patient appears unsure of his medications  · Stressed compliance with prescribed medications and regimen

## 2023-05-01 NOTE — ASSESSMENT & PLAN NOTE
Lab Results   Component Value Date    EGFR 69 04/24/2023    EGFR 90 03/07/2023    EGFR 84 03/06/2023    CREATININE 1 14 04/24/2023    CREATININE 0 92 03/07/2023    CREATININE 0 98 03/06/2023     · Appears with baseline  · Monitor BMP

## 2023-05-01 NOTE — ANESTHESIA POSTPROCEDURE EVALUATION
Post-Op Assessment Note    CV Status:  Stable  Pain Score: 0    Pain management: adequate     Mental Status:  Alert and awake   Hydration Status:  Euvolemic   PONV Controlled:  Controlled   Airway Patency:  Patent   Two or more mitigation strategies used for obstructive sleep apnea   Post Op Vitals Reviewed: Yes            No notable events documented      /69 (05/01/23 1813)    Temp 98 9 °F (37 2 °C) (05/01/23 1813)    Pulse 82 (05/01/23 1813)   Resp 14 (05/01/23 1813)    SpO2 93 % (05/01/23 1813)

## 2023-05-01 NOTE — ASSESSMENT & PLAN NOTE
· S/p reconstruction of proximal tibia today however may require cement spacer as well as possible external fixator placement  · Pain management per primary team

## 2023-05-01 NOTE — CONSULTS
[id number] [facility]  Consult  Name: Adry Velasco 59 y o  male I MRN: [id number]  Unit/Bed#: -01 I Date of Admission: 5/1/2023   Date of Service: 5/1/2023 I Hospital Day: 0    Inpatient consult to Internal Medicine  Consult performed by: Mic Reaves MD  Consult ordered by: Minerva Welch PA-C        Assessment/Plan   * Closed fracture of medial plateau of right tibia  Assessment & Plan  · S/p reconstruction of proximal tibia today however may require cement spacer as well as possible external fixator placement  · Pain management per primary team    MANNY (obstructive sleep apnea)  Assessment & Plan  · Continue CPAP, patient reported he is not compliant  · Importance of compliance with regimen    Stage 3b chronic kidney disease [facility])  Assessment & Plan  Lab Results   Component Value Date    EGFR 69 04/24/2023    EGFR 90 03/07/2023    EGFR 84 03/06/2023    CREATININE 1 14 04/24/2023    CREATININE 0 92 03/07/2023    CREATININE 0 98 03/06/2023     · Appears with baseline  · Monitor BMP    Type 2 diabetes mellitus with diabetic peripheral angiopathy without gangrene [facility])  Assessment & Plan  Lab Results   Component Value Date    HGBA1C 7 9 (H) 04/24/2023       Recent Labs     05/01/23  1034 05/01/23  1814 05/01/23  1853   POCGLU 154* 110 115       Blood Sugar Average: Last 72 hrs:  (P) 884 [id number]     · A1c last 4/2023 7 9  · Reports that he is taking Lantus 45 units at bedtime, Apidra 30 units 3 times daily with meals  · Sugars appear better controlled, will give half of lantus dose at bedtime    Will give half of mealtime insulin dosing and cover with SSI  · Accu-Cheks, adjust regimen accordingly  · Gustavo/cho controlled diet    PAD (peripheral artery disease) ([facility] [address] )  Assessment & Plan  · On aspirin, Plavix  · Continue if cleared by Orthopedics    History of nonadherence to medical treatment  Assessment & Plan  · Patient appears unsure of his medications  · Stressed compliance with prescribed medications and regimen    Hyperlipidemia  Assessment & Plan  · Continue home Lipitor    Hypertension, essential  Assessment & Plan  · Continue home HCTZ/irbesartan, verapamil  · Monitor BP          Total Time Spent on Date of Encounter in care of patient: 65 minutes This time was spent on one or more of the following: performing physical exam; counseling and coordination of care; obtaining or reviewing history; documenting in the medical record; reviewing/ordering tests, medications or procedures; communicating with other healthcare professionals and discussing with patient's family/caregivers  Collaboration of Care: Were Recommendations Directly Discussed with Primary Treatment Team? Yes    History of Present Illness:  Marie Jimenez is a 59 y o  male who is originally admitted to the orthopedics service due to Goprelto fracture  We are consulted for further evaluation and management of multiple comorbid conditions patient has PMH of hypertension, diabetes on insulin, PAD, CKD  Patient is stating he is only taking long-acting insulin once and it is during bedtime  Does not endorse any chest pain or shortness of breath  Has a history of MANNY however noncompliant with CPAP  Review of Systems:  Review of Systems   Constitutional: Negative for chills and fever  HENT: Negative for tinnitus and trouble swallowing  Eyes: Negative for pain and visual disturbance  Respiratory: Negative for cough and shortness of breath  Cardiovascular: Negative for chest pain  Gastrointestinal: Negative for abdominal pain, nausea and vomiting  Genitourinary: Negative for dysuria and hematuria  Musculoskeletal: Positive for gait problem  Skin: Negative for color change and pallor  Neurological: Negative for dizziness  Hematological: Negative for adenopathy  Psychiatric/Behavioral: Negative for confusion and hallucinations  All other systems reviewed and are negative        Past Medical and Surgical History:   Past Medical History:   Diagnosis Date   • Broken internal right knee prosthesis ([facility] [address] ) 01/2023   • Chronic kidney disease    • Colon polyp    • Constipation 03/09/2023   • Diabetes mellitus ([facility] [address] )    • Disease of thyroid gland    • GERD (gastroesophageal reflux disease)    • HHS vs DKA 11/16/2018   • Hyperlipidemia    • Hypertension    • Thyroid cancer ([facility] [address] )        Past Surgical History:   Procedure Laterality Date   • COLONOSCOPY     • FOOT AMPUTATION Right    • FOOT SURGERY Right 2014   • IR AORTAGRAM WITH RUN-OFF  08/06/2020   • IR TUNNELED CENTRAL LINE PLACEMENT  09/08/2020   • IR TUNNELED CENTRAL LINE REMOVAL  09/28/2020   • OH AMPUTATION FOOT TRANSMETARSAL Right 09/03/2020    Procedure: AMPUTATION TRANSMETATARSAL (TMA);  Surgeon: Carmina Mosquera DPM;  Location: [facility];  Service: Podiatry   • OH THYROIDECTOMY TOTAL/COMPLETE N/A 02/03/2021    Procedure: TOTAL THYROIDECTOMY WITH NIMS MONITORING;  Surgeon: Isaac Jimenez MD;  Location: [facility];  Service: ENT   • TOE AMPUTATION Right 08/11/2020    Procedure: AMPUTATION TOE;  Surgeon: Carmina Mosquera DPM;  Location: [facility];  Service: Podiatry   • US GUIDED THYROID BIOPSY  07/02/2020       Meds/Allergies:  PTA meds:   Prior to Admission Medications   Prescriptions Last Dose Informant Patient Reported? Taking? BD PEN NEEDLE DEV U/F 32G X 4 MM MISC   No No   Sig: Inject under the skin 4 (four) times a day   Blood Glucose Monitoring Suppl (ONE TOUCH ULTRA 2) w/Device KIT   No No   Sig: Use 2 (two) times a day   Blood Glucose Monitoring Suppl (OneTouch Verio Reflect) w/Device KIT   No No   Sig: Check blood sugars twice daily  Please substitute with appropriate alternative as covered by patient's insurance   Dx: E11 65   Insulin Glargine Solostar (Lantus SoloStar) 100 UNIT/ML SOPN 4/30/2023  No Yes   Sig: Inject 45 units bid   Patient taking differently: Inject 45 Units under the skin daily at bedtime   OneTouch Delica Lancets 20E MISC   No No   Sig: Check blood sugars twice daily  Please substitute with appropriate alternative as covered by patient's insurance  Dx: E11 65   aspirin 81 mg chewable tablet 4/26/2023  No Yes   Sig: Chew 1 tablet (81 mg total) daily   atorvastatin (LIPITOR) 40 mg tablet 4/30/2023  No Yes   Sig: Take 1 tablet (40 mg total) by mouth daily with dinner   Patient taking differently: Take 40 mg by mouth every morning   clopidogrel (PLAVIX) 75 mg tablet 4/29/2023  No Yes   Sig: Take 1 tablet (75 mg total) by mouth daily   Patient taking differently: Take 75 mg by mouth every morning   docusate sodium (COLACE) 100 mg capsule 4/29/2023  No Yes   Sig: Take 1 capsule (100 mg total) by mouth 2 (two) times a day   escitalopram (LEXAPRO) 10 mg tablet Past Week  No Yes   Sig: Take 1 tablet (10 mg total) by mouth daily Do not start before March 14, 2023  glucose blood (OneTouch Verio) test strip   No No   Sig: Check blood sugars twice daily  Please substitute with appropriate alternative as covered by patient's insurance   Dx: E11 65   insulin glulisine (Apidra) 100 units/mL injection 4/30/2023  No Yes   Sig: Inject 30 units tid with meals   Patient taking differently: PER SS  tid with meals   irbesartan-hydrochlorothiazide (AVALIDE) 150-12 5 MG per tablet 4/30/2023  No Yes   Sig: TAKE 1 TABLET DAILY   Patient taking differently: Take 1 tablet by mouth every morning   levothyroxine 150 mcg tablet 5/1/2023 at 0800  No Yes   Sig: Take 1 tablet (150 mcg total) by mouth daily   Patient taking differently: Take 150 mcg by mouth every morning   omeprazole (PriLOSEC) 40 MG capsule 4/30/2023  No Yes   Sig: TAKE 1 CAPSULE DAILY   Patient taking differently: 40 mg every morning   tamsulosin (FLOMAX) 0 4 mg Past Week  No Yes   Sig: Take 1 capsule (0 4 mg total) by mouth daily with dinner   verapamil (VERELAN PM) 360 MG 24 hr capsule 4/30/2023  No Yes   Sig: TAKE 1 CAPSULE DAILY AT BEDTIME   zolpidem (AMBIEN) 10 mg tablet 4/30/2023  No Yes "  Sig: Take 1 tablet (10 mg total) by mouth daily at bedtime as needed for sleep      Facility-Administered Medications: None       Allergies: Allergies   Allergen Reactions   • Pollen Extract Eye Swelling     Eyes get watery        Social History:  Marital Status: Single  Substance Use History:   Social History     Substance and Sexual Activity   Alcohol Use Not Currently    Comment: quit 1-2023     Social History     Tobacco Use   Smoking Status Never   Smokeless Tobacco Never     Social History     Substance and Sexual Activity   Drug Use Not Currently   • Types: Marijuana       Family History:  Family History   Problem Relation Age of Onset   • Cancer Mother    • Hypertension Father        Physical Exam:   Vitals:   Blood Pressure: 112/68 (05/01/23 1845)  Pulse: 82 (05/01/23 1826)  Temperature: 98 °F (36 7 °C) (05/01/23 1838)  Temp Source: Temporal (05/01/23 1020)  Respirations: 18 (05/01/23 1838)  Height: 6' 4\" (193 cm) (05/01/23 1020)  Weight - Scale: 122 kg (268 lb 15 4 oz) (05/01/23 1020)  SpO2: 94 % (05/01/23 1826)    Physical Exam  Vitals and nursing note reviewed  Constitutional:       General: He is not in acute distress  Appearance: He is well-developed  He is obese  He is not toxic-appearing  HENT:      Head: Normocephalic and atraumatic  Nose: Nose normal    Eyes:      Pupils: Pupils are equal, round, and reactive to light  Cardiovascular:      Rate and Rhythm: Normal rate  Pulses: Normal pulses  Pulmonary:      Effort: Pulmonary effort is normal  No respiratory distress  Breath sounds: Normal breath sounds  No wheezing  Abdominal:      Palpations: Abdomen is soft  Tenderness: There is no guarding  Musculoskeletal:         General: Tenderness (minimal, RLE) present  No swelling  Normal range of motion  Cervical back: Normal range of motion  Skin:     General: Skin is warm and dry  Capillary Refill: Capillary refill takes less than 2 seconds   " Neurological:      General: No focal deficit present  Mental Status: He is alert and oriented to person, place, and time  Mental status is at baseline  Psychiatric:         Mood and Affect: Mood normal          Thought Content: Thought content normal        Additional Data:   Lab Results:    Results from last 7 days   Lab Units 05/01/23  1703   I STAT HEMOGLOBIN g/dl 10 2*   HEMATOCRIT, ISTAT % 30*     Results from last 7 days   Lab Units 05/01/23  1703   CO2, I-STAT mmol/L 25             Lab Results   Component Value Date/Time    HGBA1C 7 9 (H) 04/24/2023 12:06 PM    HGBA1C 11 1 (H) 11/14/2022 02:28 PM    HGBA1C 6 4 (H) 12/10/2020 11:09 AM    HGBA1C 10 5 (H) 06/22/2018 11:27 AM    HGBA1C 11 0 (H) 02/09/2018 11:10 AM     Results from last 7 days   Lab Units 05/01/23  1853 05/01/23  1814 05/01/23  1034   POC GLUCOSE mg/dl 115 110 154*           Imaging: Reviewed radiology reports from this admission including: xray(s)  XR knee 1 or 2 vw right    (Results Pending)       EKG, Pathology, and Other Studies Reviewed on Admission:   · EKG: NA     ** Please Note: This note may have been constructed using a voice recognition system   **

## 2023-05-01 NOTE — TELEPHONE ENCOUNTER
Medication:  PDMP     1  [id number] 04/27/2023 04/25/2023 Zolpidem Tartrate (Tablet)  90 0 90 10 MG NA JENNIFER HOLLIS  [facility]  [employer] 0 / 1 [place]    1  [id number] 03/22/2023 03/20/2023 Zolpidem Tartrate (Tablet)  10 0 10 10 MG NA JENNIFER HOLLIS  [facility]  [employer] 0 / 0 [place]    2  [id number] 02/21/2023 02/21/2023 traMADol HCL (Tablet)  45 0 11 50 MG  20 45 JESUSITA MARQUES [facility]  [employer] 0 / 0 [place]        Active agreement on file -No

## 2023-05-01 NOTE — UTILIZATION REVIEW
Continued Stay Review    Date: 8/11                        Current Patient Class: Inpatient  Current Level of Care: Med surg    HPI:55 y o  male initially admitted on 8/4    Assessment/Plan: Sepsis, Gangrene of right foot great toe with osteomyelitis, Poorly controlled diabetes with neuropathy and vascular disease  8/11 OR - S/P AMPUTATION TOE (Right)  Operative Findings:  Soft grey bone, gangrenous toe, malodor    Continue Iv antibiotics through today  Plan transition to oral Bactrim with Flagyl starting tomorrow  Continue trend fever curve  Cbc/CMP tomorrow 8/12  Podiatry and Vascular following  Ongoing glucose management        Pertinent Labs/Diagnostic Results:   Results from last 7 days   Lab Units 08/07/20  1135   SARS-COV-2  Negative     Results from last 7 days   Lab Units 08/11/20  0543 08/08/20  0538 08/07/20  0624 08/06/20  1155 08/05/20  0506   WBC Thousand/uL 10 22* 10 63* 13 82* 14 02* 14 74*   HEMOGLOBIN g/dL 12 1 12 1 11 2* 11 7* 11 6*   HEMATOCRIT % 35 2* 35 8* 33 1* 34 0* 34 5*   PLATELETS Thousands/uL 416* 361 340 311 297   NEUTROS ABS Thousands/µL 6 72  --  9 96* 10 30*  --          Results from last 7 days   Lab Units 08/11/20  0543 08/08/20  0538 08/07/20  0624 08/06/20  1151 08/05/20  0506   SODIUM mmol/L 136 138 136 136 134*   POTASSIUM mmol/L 3 3* 3 5 3 3* 3 4* 3 7   CHLORIDE mmol/L 100 102 101 102 100   CO2 mmol/L 27 27 27 27 26   ANION GAP mmol/L 9 9 8 7 8   BUN mg/dL 11 11 14 12 23   CREATININE mg/dL 0 79 0 76 0 75 0 92 0 92   EGFR ml/min/1 73sq m 100 101 102 92 92   CALCIUM mg/dL 8 6 8 6 8 3 8 4 8 2*   MAGNESIUM mg/dL  --   --   --   --  1 7         Results from last 7 days   Lab Units 08/11/20  1538 08/11/20  1121 08/11/20  0815 08/10/20  2054 08/10/20  1533 08/10/20  1133 08/10/20  0739 08/09/20  2100 08/09/20  1626 08/09/20  1052 08/09/20  0722 08/08/20  2128   POC GLUCOSE mg/dl 184* 176* 172* 176* 217* 207* 71 202* 220* 141* 78 195*     Results from last 7 days   Lab Units 08/11/20  0543 08/08/20  0538 08/07/20  0624 08/06/20  1151 08/05/20  0506   GLUCOSE RANDOM mg/dL 172* 165* 127 176* 223*         Results from last 7 days   Lab Units 08/05/20  0506   HEMOGLOBIN A1C % 9 7*   EAG mg/dl 232       Results from last 7 days   Lab Units 08/05/20  0506   PROCALCITONIN ng/ml 0 13     Results from last 7 days   Lab Units 08/04/20  1709   LACTIC ACID mmol/L 1 9     Results from last 7 days   Lab Units 08/04/20  1837   GRAM STAIN RESULT  2+ Gram positive cocci in pairs and chains*  1+ Gram negative rods*  No polys seen*   WOUND CULTURE  3+ Growth of Morganella morganii*  2+ Growth of Citrobacter freundii*  2+ Growth of Enterobacter cloacae complex*  2+ Growth of        Vital Signs:   /11/20 15:41:54   99 °F (37 2 °C)      18   137/88   104                08/11/20 15:37:25            137/88   104                08/11/20 0840         16            None (Room air)          08/11/20 0830      79   22   145/91      95 %   None (Room air)   WDL       08/11/20 0816   97 3 °F (36 3 °C)Abnormal     78   20   126/81      94 %   None (Room air         Medications:   Scheduled Medications:  aspirin, 81 mg, Oral, Daily  atorvastatin, 40 mg, Oral, Daily With Dinner  cefepime, 2,000 mg, Intravenous, Q8H  clopidogrel, 75 mg, Oral, Daily  heparin (porcine), 5,000 Units, Subcutaneous, Q8H TESS  insulin glargine, 65 Units, Subcutaneous, HS  insulin lispro, 1-5 Units, Subcutaneous, TID AC  insulin lispro, 1-5 Units, Subcutaneous, HS  insulin lispro, 6 Units, Subcutaneous, TID With Meals  metroNIDAZOLE, 500 mg, Intravenous, Q8H  [START ON 8/12/2020] metroNIDAZOLE, 500 mg, Oral, Q8H TESS  pantoprazole, 40 mg, Oral, Early Morning  saccharomyces boulardii, 250 mg, Oral, BID  sulfamethoxazole-trimethoprim, 1 tablet, Oral, Q12H TESS  verapamil, 240 mg, Oral, Daily      Continuous IV Infusions:  dextrose 5 % and sodium chloride 0 9 %, 50 mL/hr, Intravenous, Continuous  lactated ringers, 125 mL/hr, Intravenous, Continuous      PRN Meds:  acetaminophen, 650 mg, Oral, Q6H PRN  hydrOXYzine HCL, 25 mg, Oral, Q6H PRN  ondansetron, 4 mg, Intravenous, Q6H PRN      Discharge Plan: [facility]    Network Utilization Review Department  [email or web address]  ATTENTION: Please call with any questions or concerns to [phone] and carefully listen to the prompts so that you are directed to the right person  All voicemails are confidential   Symone Fuentes all requests for admission clinical reviews, approved or denied determinations and any other requests to dedicated fax number below belonging to the campus where the patient is receiving treatment   List of dedicated fax numbers for the Facilities:  [address] [place] [address] DENIALS (Administrative/Medical Necessity) [phone]   [address] (Maternity/NICU/Pediatrics) [phone]   Markie Florentino [phone]   Cherylene Freud [phone]   Annamaria Thornton [phone]   [facility] [phone]   [address] [place] [address] [address] [place] [address] [phone]   [facility]  [phone]   [address] [facility]  [address] [facility] 1000 [address] [facility] [phone] 15

## 2023-05-01 NOTE — ASSESSMENT & PLAN NOTE
Lab Results   Component Value Date    HGBA1C 7 9 (H) 04/24/2023       Recent Labs     05/01/23  1034 05/01/23  1814 05/01/23  1853   POCGLU 154* 110 115       Blood Sugar Average: Last 72 hrs:  (P) 207 [id number]     · A1c last 4/2023 7 9  · Reports that he is taking Lantus 45 units at bedtime, Apidra 30 units 3 times daily with meals  · Sugars appear better controlled, continue Lantus dose at bedtime    Will give half of mealtime insulin dosing and cover with SSI  · Accu-Cheks, adjust regimen accordingly  · Gustavo/cho controlled diet

## 2023-05-01 NOTE — ANESTHESIA PREPROCEDURE EVALUATION
Procedure:  OPEN REDUCTION W/ INTERNAL FIXATION (ORIF) RIGHT TIBIAL PLATEAU NONUNION (Right: Knee)    Relevant Problems   CARDIO   (+) Hyperlipidemia   (+) Hypertension, essential   (+) Type 2 diabetes mellitus with diabetic peripheral angiopathy without gangrene (HCC)      ENDO   (+) Acquired hypothyroidism   (+) Type 2 diabetes mellitus with diabetic peripheral angiopathy without gangrene (HCC)      GI/HEPATIC   (+) Gastroesophageal reflux disease without esophagitis      /RENAL   (+) MARKELL (acute kidney injury) (HCC)   (+) Chronic kidney disease-mineral and bone disorder   (+) Stage 3b chronic kidney disease (HCC)      HEMATOLOGY   (+) Anemia      NEURO/PSYCH   (+) Depression and anxiety   (+) History of nonadherence to medical treatment      Diabetes mellitus (HCC)    Hypertension    Hyperlipidemia    Thyroid cancer ([facility] [address] )    Chronic kidney disease    Colon polyp    Broken internal right knee prosthesis (HCC)    HHS vs DKA    Constipation    Disease of thyroid gland    GERD (gastroesophageal reflux disease)        Physical Exam    Airway    Mallampati score: II  TM Distance: >3 FB  Neck ROM: full     Dental       Cardiovascular  Cardiovascular exam normal    Pulmonary  Pulmonary exam normal     Other Findings        Anesthesia Plan  ASA Score- 3     Anesthesia Type- general with ASA Monitors  Additional Monitors:   Airway Plan:     Comment: PNB PSR      Mod to high risk  Plan Factors-Exercise tolerance (METS): >4 METS  Chart reviewed  EKG reviewed  Imaging results reviewed  Existing labs reviewed  Patient summary reviewed  Induction- intravenous  Postoperative Plan- Plan for postoperative opioid use  Planned trial extubation    Informed Consent- Anesthetic plan and risks discussed with patient  I personally reviewed this patient with the CRNA  Discussed and agreed on the Anesthesia Plan with the CRNA  Poly Luis

## 2023-05-01 NOTE — INTERVAL H&P NOTE
H&P reviewed  After examining the patient I find no changes in the patients condition since the H&P had been written      Abdomen : present     Vitals:    05/01/23 1020   BP: 114/81   Pulse: 85   Resp: 20   Temp: 97 8 °F (36 6 °C)   SpO2: 98%

## 2023-05-02 LAB
ANION GAP SERPL CALCULATED.3IONS-SCNC: 10 MMOL/L (ref 4–13)
BASOPHILS # BLD AUTO: 0.05 THOUSANDS/ÂΜL (ref 0–0.1)
BASOPHILS NFR BLD AUTO: 0 % (ref 0–1)
BUN SERPL-MCNC: 20 MG/DL (ref 5–25)
CALCIUM SERPL-MCNC: 8.8 MG/DL (ref 8.4–10.2)
CHLORIDE SERPL-SCNC: 102 MMOL/L (ref 96–108)
CO2 SERPL-SCNC: 22 MMOL/L (ref 21–32)
CREAT SERPL-MCNC: 1.04 MG/DL (ref 0.6–1.3)
EOSINOPHIL # BLD AUTO: 0 THOUSAND/ÂΜL (ref 0–0.61)
EOSINOPHIL NFR BLD AUTO: 0 % (ref 0–6)
ERYTHROCYTE [DISTWIDTH] IN BLOOD BY AUTOMATED COUNT: 13.1 % (ref 11.6–15.1)
GFR SERPL CREATININE-BSD FRML MDRD: 77 ML/MIN/1.73SQ M
GLUCOSE SERPL-MCNC: 158 MG/DL (ref 65–140)
GLUCOSE SERPL-MCNC: 163 MG/DL (ref 65–140)
GLUCOSE SERPL-MCNC: 164 MG/DL (ref 65–140)
GLUCOSE SERPL-MCNC: 170 MG/DL (ref 65–140)
GLUCOSE SERPL-MCNC: 181 MG/DL (ref 65–140)
HCT VFR BLD AUTO: 27.9 % (ref 36.5–49.3)
HGB BLD-MCNC: 9.1 G/DL (ref 12–17)
IMM GRANULOCYTES # BLD AUTO: 0.06 THOUSAND/UL (ref 0–0.2)
IMM GRANULOCYTES NFR BLD AUTO: 0 % (ref 0–2)
LYMPHOCYTES # BLD AUTO: 0.68 THOUSANDS/ÂΜL (ref 0.6–4.47)
LYMPHOCYTES NFR BLD AUTO: 5 % (ref 14–44)
MCH RBC QN AUTO: 30.2 PG (ref 26.8–34.3)
MCHC RBC AUTO-ENTMCNC: 32.6 G/DL (ref 31.4–37.4)
MCV RBC AUTO: 93 FL (ref 82–98)
MONOCYTES # BLD AUTO: 1.56 THOUSAND/ÂΜL (ref 0.17–1.22)
MONOCYTES NFR BLD AUTO: 12 % (ref 4–12)
NEUTROPHILS # BLD AUTO: 11.05 THOUSANDS/ÂΜL (ref 1.85–7.62)
NEUTS SEG NFR BLD AUTO: 83 % (ref 43–75)
NRBC BLD AUTO-RTO: 0 /100 WBCS
PLATELET # BLD AUTO: 288 THOUSANDS/UL (ref 149–390)
PMV BLD AUTO: 10.3 FL (ref 8.9–12.7)
POTASSIUM SERPL-SCNC: 4 MMOL/L (ref 3.5–5.3)
RBC # BLD AUTO: 3.01 MILLION/UL (ref 3.88–5.62)
SODIUM SERPL-SCNC: 134 MMOL/L (ref 135–147)
WBC # BLD AUTO: 13.4 THOUSAND/UL (ref 4.31–10.16)

## 2023-05-02 RX ORDER — ONDANSETRON 2 MG/ML
4 INJECTION INTRAMUSCULAR; INTRAVENOUS ONCE
Status: COMPLETED | OUTPATIENT
Start: 2023-05-02 | End: 2023-05-02

## 2023-05-02 RX ORDER — ONDANSETRON 2 MG/ML
4 INJECTION INTRAMUSCULAR; INTRAVENOUS EVERY 4 HOURS PRN
Status: DISCONTINUED | OUTPATIENT
Start: 2023-05-02 | End: 2023-05-02

## 2023-05-02 RX ORDER — LOSARTAN POTASSIUM 50 MG/1
50 TABLET ORAL DAILY
Status: DISCONTINUED | OUTPATIENT
Start: 2023-05-03 | End: 2023-05-09

## 2023-05-02 RX ORDER — HYDROCHLOROTHIAZIDE 12.5 MG/1
12.5 TABLET ORAL DAILY
Status: DISCONTINUED | OUTPATIENT
Start: 2023-05-03 | End: 2023-05-03

## 2023-05-02 RX ADMIN — INSULIN LISPRO 2 UNITS: 100 INJECTION, SOLUTION INTRAVENOUS; SUBCUTANEOUS at 16:34

## 2023-05-02 RX ADMIN — ATORVASTATIN CALCIUM 40 MG: 40 TABLET, FILM COATED ORAL at 08:29

## 2023-05-02 RX ADMIN — CEFAZOLIN SODIUM 2000 MG: 2 SOLUTION INTRAVENOUS at 04:53

## 2023-05-02 RX ADMIN — LEVOTHYROXINE SODIUM 150 MCG: 150 TABLET ORAL at 08:29

## 2023-05-02 RX ADMIN — DOCUSATE SODIUM 100 MG: 100 CAPSULE, LIQUID FILLED ORAL at 08:29

## 2023-05-02 RX ADMIN — ONDANSETRON 4 MG: 2 INJECTION INTRAMUSCULAR; INTRAVENOUS at 13:15

## 2023-05-02 RX ADMIN — HYDROMORPHONE HYDROCHLORIDE 4 MG: 4 TABLET ORAL at 10:20

## 2023-05-02 RX ADMIN — LOSARTAN POTASSIUM 50 MG: 50 TABLET, FILM COATED ORAL at 08:29

## 2023-05-02 RX ADMIN — PANTOPRAZOLE SODIUM 40 MG: 40 TABLET, DELAYED RELEASE ORAL at 05:03

## 2023-05-02 RX ADMIN — HYDROMORPHONE HYDROCHLORIDE 4 MG: 4 TABLET ORAL at 17:28

## 2023-05-02 RX ADMIN — CLOPIDOGREL BISULFATE 75 MG: 75 TABLET ORAL at 08:29

## 2023-05-02 RX ADMIN — SODIUM CHLORIDE, SODIUM LACTATE, POTASSIUM CHLORIDE, AND CALCIUM CHLORIDE 100 ML/HR: .6; .31; .03; .02 INJECTION, SOLUTION INTRAVENOUS at 04:01

## 2023-05-02 RX ADMIN — HYDROCHLOROTHIAZIDE 12.5 MG: 12.5 TABLET ORAL at 08:29

## 2023-05-02 RX ADMIN — TAMSULOSIN HYDROCHLORIDE 0.4 MG: 0.4 CAPSULE ORAL at 16:35

## 2023-05-02 RX ADMIN — HYDROMORPHONE HYDROCHLORIDE 4 MG: 4 TABLET ORAL at 02:08

## 2023-05-02 RX ADMIN — INSULIN LISPRO 2 UNITS: 100 INJECTION, SOLUTION INTRAVENOUS; SUBCUTANEOUS at 12:23

## 2023-05-02 RX ADMIN — HYDROMORPHONE HYDROCHLORIDE 0.2 MG: 0.2 INJECTION, SOLUTION INTRAMUSCULAR; INTRAVENOUS; SUBCUTANEOUS at 04:05

## 2023-05-02 RX ADMIN — ENOXAPARIN SODIUM 40 MG: 40 INJECTION SUBCUTANEOUS at 04:53

## 2023-05-02 RX ADMIN — ASPIRIN 81 MG: 81 TABLET, CHEWABLE ORAL at 08:29

## 2023-05-02 RX ADMIN — HYDROMORPHONE HYDROCHLORIDE 4 MG: 4 TABLET ORAL at 23:28

## 2023-05-02 RX ADMIN — INSULIN GLARGINE 25 UNITS: 100 INJECTION, SOLUTION SUBCUTANEOUS at 22:00

## 2023-05-02 RX ADMIN — ESCITALOPRAM OXALATE 10 MG: 10 TABLET, FILM COATED ORAL at 08:29

## 2023-05-02 RX ADMIN — INSULIN LISPRO 2 UNITS: 100 INJECTION, SOLUTION INTRAVENOUS; SUBCUTANEOUS at 22:11

## 2023-05-02 RX ADMIN — INSULIN LISPRO 2 UNITS: 100 INJECTION, SOLUTION INTRAVENOUS; SUBCUTANEOUS at 08:51

## 2023-05-02 NOTE — OCCUPATIONAL THERAPY NOTE
Occupational Therapy Evaluation      Eulice Merlin    5/2/2023    Patient Active Problem List   Diagnosis    Hypertension, essential    Gastroesophageal reflux disease without esophagitis    Hyperlipidemia    History of nonadherence to medical treatment    Right-sided tinnitus    Colon polyps    Elevated liver enzymes    Health maintenance examination    PAD (peripheral artery disease) (HCC)    Thyroid cancer (HCC)    Urinary retention    Hypomagnesemia    Anemia    Abnormal EKG    Preoperative examination    Insomnia    Persistent proteinuria    Acquired absence of right foot ([facility] [address] )    Acquired hypothyroidism    Physical deconditioning    Type 2 diabetes mellitus with diabetic peripheral angiopathy without gangrene ([facility] )    Dupuytren's contracture of right hand    Stage 3b chronic kidney disease ([facility] )    Hyperkalemia    Alcoholism ([facility] )    Closed fracture of right tibial plateau    Obesity, Class I, BMI 30 0-34 9 (see actual BMI)    MARKELL (acute kidney injury) ([facility] )    Chronic kidney disease-mineral and bone disorder    Acquired genu varum of right lower extremity    Depression and anxiety    Closed fracture of medial plateau of right tibia    MANNY (obstructive sleep apnea)       Past Medical History:   Diagnosis Date    Broken internal right knee prosthesis ([facility] ) 01/2023    Chronic kidney disease     Colon polyp     Constipation 03/09/2023    Diabetes mellitus ([facility] [address] )     Disease of thyroid gland     GERD (gastroesophageal reflux disease)     HHS vs DKA 11/16/2018    Hyperlipidemia     Hypertension     Thyroid cancer ([facility] )        Past Surgical History:   Procedure Laterality Date    COLONOSCOPY      FOOT AMPUTATION Right     FOOT SURGERY Right 2014    IR AORTAGRAM WITH RUN-OFF  08/06/2020    IR TUNNELED CENTRAL LINE PLACEMENT  09/08/2020    IR TUNNELED CENTRAL LINE REMOVAL  09/28/2020    [place] AMPUTATION FOOT TRANSMETARSAL Right 09/03/2020    Procedure: AMPUTATION TRANSMETATARSAL (TMA);  Surgeon: Alan Stephens DPM; Location: [facility];  Service: Podiatry    CO OPEN Jose A Daron UNICONDYLAR Right 5/1/2023    Procedure: OPEN REDUCTION W/ INTERNAL FIXATION (ORIF) RIGHT TIBIAL PLATEAU NONUNION;  Surgeon: Letitia Fonseca MD;  Location: [facility];  Service: Orthopedics    CO THYROIDECTOMY TOTAL/COMPLETE N/A 02/03/2021    Procedure: TOTAL THYROIDECTOMY WITH NIMS MONITORING;  Surgeon: Leslie Martinez MD;  Location: [facility];  Service: ENT    TOE AMPUTATION Right 08/11/2020    Procedure: AMPUTATION TOE;  Surgeon: Hu Mora DPM;  Location: [facility];  Service: [address] THYROID BIOPSY  07/02/2020 05/02/23 0854   OT Last Visit   OT Visit Date 05/02/23   Note Type   Note type Evaluation   Additional Comments Pt agreeable to OT eval  Upon arrival pt supine in bed with HOB elevated  Pain Assessment   Pain Assessment Tool 0-10   Pain Score 9   Pain Location/Orientation Orientation: Right;Location: Leg   Pain Radiating Towards Pain radiates down to foot   Pain Onset/Description Onset: Ongoing;Frequency: Constant/Continuous; Descriptor: [address] Pain Intervention(s) Ambulation/increased activity;Repositioned;Elevated;Medication (See MAR)   Restrictions/Precautions   Weight Bearing Precautions Per Order Yes   RLE Weight Bearing Per Order NWB   Braces or Orthoses Knee immobilizer   Other Precautions Bed Alarm;Multiple lines;WBS;Fall Risk;Pain   Home Living   Type of [address] [place] [address] One level;Performs ADLs on one level; Able to live on main level with bedroom/bathroom;Stairs to enter with rails  (4 DANI[address]; friend was assisting with w/c up/down stairs)   Bathroom Shower/Tub   (Sponge bathes only at baseline)   [address] [facility] bars around toilet   [address]  (uses transport chair to get into bathroom   uses manual w/c for locomotion)   Additional Comments Pt reports that he was d/c'ed home from [facility] ~1 week ago  Prior Function   Level of [place] Modified independent with wheelchair;Needs assistance with IADLS; Independent with ADLs  (Wife provides Set-up/Supervision for ADLs)   Lives With Spouse   Receives Help From Family   IADLs Family/Friend/Other provides transportation; Family/Friend/Other provides meals; Family/Friend/Other provides medication management   Falls in the last 6 months 1 to 4  (per pt)   Vocational On disability   Lifestyle   Autonomy Patient reporting being independent with ADLs, ambulatory with wheelchair and lives with wife in a one level house   Reciprocal Relationships Wife and friend   Service to Others On disability   ADL   Eating Assistance 5  Supervision/Setup   Grooming Assistance 5  Supervision/Setup   UB Bathing Assistance 4  Minimal Assistance   LB Bathing Assistance 2  Maximal Assistance    [address] 3  Moderate Assistance    [address] 2  Maximal [address]  2  Maximal Assistance   Additional Comments ADL levels based on functional performance during OT eval    Bed Mobility   Rolling R 4  Minimal assistance   Additional items Assist x 2;HOB elevated; Bedrails; Increased time required;Verbal cues;LE management   Rolling L 4  Minimal assistance   Additional items Assist x 2;HOB elevated; Bedrails; Increased time required;Verbal cues;LE management   Supine to Sit Unable to assess  (Pt declined further mobility d/t significant pain)   Activity Tolerance   Activity Tolerance Patient limited by pain; Patient limited by fatigue   Medical Staff Made Aware Co-evaluation performed with PT Paz Cortez due to medical complexity and clinical presentation   RUE Assessment   RUE Assessment WFL  (grossly 4-/5 MMT)   LUE Assessment   LUE Assessment WFL  (grossly 4-/5 MMT)   Hand Function   Gross Motor Coordination Functional   Fine Motor Coordination Functional   Sensation   Light Touch Severe deficits in the RLE; Severe deficits in the LLE;Partial deficits in the RUE;Partial deficits in the LUE  (neuropathy in B feet and occasionally B hands)   Vision-Basic Assessment   Current Vision Wears glasses all the time   Cognition   Overall Cognitive Status [facility]   Arousal/Participation Alert; Responsive; Cooperative   Attention Within functional limits   Orientation Level Oriented X4   Memory Within functional limits   Following Commands Follows all commands and directions without difficulty   Assessment   Limitation Decreased ADL status; Decreased UE strength;Decreased endurance;Decreased self-care trans;Decreased high-level ADLs   Prognosis Good   Assessment Patient is a 59 y o  male seen for OT evaluation s/p admit to [address] [facility]  on 5/1/2023 w/Closed fracture of medial plateau of right tibia  Commorbidities affecting patient's functional performance at time of assessment include: HTN, HLD, PAD, DM2, CKD, and MANNY  Orders placed for OT evaluation and treatment and up with assistance  Performed at least two patient identifiers during session including name and wristband  Prior to admission, Patient reporting being independent with ADLs, ambulatory with wheelchair and lives with wife in a one level house  Personal factors affecting patient at time of initial evaluation include: steps to enter, difficulty performing ADLs and difficulty performing IADLs  Upon evaluation, patient requires minimal  and moderate assist for UB ADLs, maximal assist for LB ADLs  Patient is oriented x 4  Occupational performance is affected by the following deficits: decreased muscle strength, dynamic sit/ stand balance deficit with poor standing tolerance time for self care and functional mobility, decreased activity tolerance, increased pain, orthopedic restrictions, decreased coping skills and delayed righting and equilibrium reactions   Patient to benefit from continued Occupational Therapy treatment while in the hospital to address deficits as defined above and maximize level of functional independence with ADLs and functional mobility  Occupational Performance areas to address include: grooming , bathing/ shower, dressing, toilet hygiene, transfer to all surfaces and functional ambulation  From OT standpoint, recommendation at time of d/c would be Post acute rehabilitation services  Goals   Patient Goals to have less pain   Plan   Treatment Interventions ADL retraining;Functional transfer training; Endurance training;Patient/family training; Compensatory technique education; Activityengagement;Equipment evaluation/education;UE strengthening/ROM   Goal Expiration Date 05/17/23   OT Treatment Day 0   OT Frequency 3-5x/wk   Recommendation   OT Discharge Recommendation Post acute rehabilitation services   Additional Comments  The patient's raw score on the AM-PAC Daily Activity Inpatient Short Form is 12  A raw score of less than 19 suggests the patient may benefit from discharge to post-acute rehabilitation services  Please refer to the recommendation of the Occupational Therapist for safe discharge planning  AM-PAC Daily Activity Inpatient   Lower Body Dressing 1   Bathing 1   Toileting 1   Upper Body Dressing 2   Grooming 3   Eating 4   Daily Activity Raw Score 12   Daily Activity Standardized Score (Calc for Raw Score >=11) 30 6   AM-[facility] Applied Cognition Inpatient   Following a Speech/Presentation 4   Understanding Ordinary Conversation 4   Taking Medications 3   Remembering Where Things Are Placed or Put Away 3   Remembering List of 4-5 Errands 3   Taking Care of Complicated Tasks 3   Applied Cognition Raw Score 20   Applied Cognition Standardized Score 41 76   Modified Bladen Scale   Modified Bladen Scale 5   End of Consult   Patient Position at End of Consult Supine;Bed/Chair alarm activated; All needs within reach   Nurse Communication Nurse aware of consult     GOALS:    *ADL transfers with CGA for inc'd independence with ADLs/purposeful tasks    *UB ADL with (S) for inc'd independence with self cares    *LB ADL with Min (A) using AE prn for inc'd independence with self cares    *Toileting with Min (A) for clothing management and hygiene for return to PLOF with personal care    *Increase stand tolerance x 2  m for inc'd tolerance with standing purposeful tasks    *Participate in 10m UE therex to increase overall stamina/activity tolerance for purposeful tasks    *Bed mobility- (S) for inc'd independence to manage own comfort and initiate EOB & OOB purposeful tasks    *Patient will verbalize 3 safety awareness/ principles to prevent falls in the home setting  *Patient will increase OOB/sitting tolerance to 2-4 hours per day to increase participation in self-care and leisure tasks with no s/s of exertion  *Pt will verbalize and demonstrate understanding of post-op movement precautions 100% in tx sessions for increased safety and functional mobility        *Pt will demonstrate use of long handled AE during 100% of tx sessions for increased ADL safety and independence following D/C     Hillary Eaton, MELISSA, OTR/L

## 2023-05-02 NOTE — PLAN OF CARE
Problem: OCCUPATIONAL THERAPY ADULT  Goal: Performs self-care activities at highest level of function for planned discharge setting  See evaluation for individualized goals  Description: Treatment Interventions: ADL retraining, Functional transfer training, Endurance training, Patient/family training, Compensatory technique education, Activityengagement, Equipment evaluation/education, UE strengthening/ROM          See flowsheet documentation for full assessment, interventions and recommendations  Note: Limitation: Decreased ADL status, Decreased UE strength, Decreased endurance, Decreased self-care trans, Decreased high-level ADLs  Prognosis: Good  Assessment: Patient is a 59 y o  male seen for OT evaluation s/p admit to [id number] [place] [address]  on 5/1/2023 w/Closed fracture of medial plateau of right tibia  Commorbidities affecting patient's functional performance at time of assessment include: HTN, HLD, PAD, DM2, CKD, and MANNY  Orders placed for OT evaluation and treatment and up with assistance  Performed at least two patient identifiers during session including name and wristband  Prior to admission, Patient reporting being independent with ADLs, ambulatory with wheelchair and lives with wife in a one level house  Personal factors affecting patient at time of initial evaluation include: steps to enter, difficulty performing ADLs and difficulty performing IADLs  Upon evaluation, patient requires minimal  and moderate assist for UB ADLs, maximal assist for LB ADLs  Patient is oriented x 4  Occupational performance is affected by the following deficits: decreased muscle strength, dynamic sit/ stand balance deficit with poor standing tolerance time for self care and functional mobility, decreased activity tolerance, increased pain, orthopedic restrictions, decreased coping skills and delayed righting and equilibrium reactions   Patient to benefit from continued Occupational Therapy treatment while in the hospital to address deficits as defined above and maximize level of functional independence with ADLs and functional mobility  Occupational Performance areas to address include: grooming , bathing/ shower, dressing, toilet hygiene, transfer to all surfaces and functional ambulation  From OT standpoint, recommendation at time of d/c would be Post acute rehabilitation services       OT Discharge Recommendation: Post acute rehabilitation services     [facility] OTD, OTR/L

## 2023-05-02 NOTE — PROGRESS NOTES
"[address] [place] [address]  Progress Note  Name: Ana Rosa Biswas  MRN: [id number]  Unit/Bed#: -01 I Date of Admission: 5/1/2023   Date of Service: 5/2/2023  Hospital Day: 1    Assessment/Plan   * Closed fracture of medial plateau of right tibia  Assessment & Plan  · S/p reconstruction of proximal tibia today however may require cement spacer as well as possible external fixator placement  · Pain management per primary team  Ortho primary    MANNY (obstructive sleep apnea)  Assessment & Plan  · Continue CPAP, patient reported he is not compliant  · Importance of compliance with regimen    Stage 3b chronic kidney disease [facility])  Assessment & Plan  Lab Results   Component Value Date    EGFR 77 05/02/2023    EGFR 69 04/24/2023    EGFR 90 03/07/2023    CREATININE 1 04 05/02/2023    CREATININE 1 14 04/24/2023    CREATININE 0 92 03/07/2023     · Appears with baseline, stable  · Monitor BMP    Type 2 diabetes mellitus with diabetic peripheral angiopathy without gangrene [facility])  Assessment & Plan  Lab Results   Component Value Date    HGBA1C 7 9 (H) 04/24/2023       Recent Labs     05/01/23  1034 05/01/23  1814 05/01/23  1853   POCGLU 154* 110 115       Blood Sugar Average: Last 72 hrs:  (P) 079 [id number]     · A1c last 4/2023 7 9  · Reports that he is taking Lantus 45 units at bedtime, Apidra 30 units 3 times daily with meals  · Currently on lantus 25mg HS and 15-15-15 + SSI  Will increase lantus to 30U    Hypertension, essential  Assessment & Plan  · /59   Pulse 62   Temp 97 9 °F (36 6 °C)   Resp 18   Ht 6' 4\" (1 93 m)   Wt 122 kg (268 lb 15 4 oz)   SpO2 94%   BMI 32 74 kg/m²   · Stable pressures  Continue home HCTZ/irbesartan, verapamil  · Monitor BP    Concern for urinary retention:   Urinary retention protocol ordered  Patient is refusing straight cath if need be  He would like to get Mae directly    Urology consulted    VTE Pharmacologic Prophylaxis:   Lovenox    Patient Centered " Rounds: I performed bedside rounds with nursing staff today  Discussions with Specialists or Other Care Team Provider: Yes, Ortho    Education and Discussions with Family / Patient: Patient declined call to   Total Time Spent on Date of Encounter in care of patient: 25 minutes This time was spent on one or more of the following: performing physical exam; counseling and coordination of care; obtaining or reviewing history; documenting in the medical record; reviewing/ordering tests, medications or procedures; communicating with other healthcare professionals and discussing with patient's family/caregivers  Current Length of Stay: 1 day(s)  Current Patient Status: Inpatient   Certification Statement: The patient will continue to require additional inpatient hospital stay due to Concern for urinary retention  Discharge Plan: [facility] is following this patient on consult  They are not yet medically stable for discharge secondary to Concern for urinary retention  Code Status: Level 1 - Full Code    Subjective:   Seen and examined at bedside  Complains of pain at the surgical site, being managed by the primary team   Denies any chest pain or shortness of breath or nausea vomiting diarrhea or altered mental status or delirium episodes  No acute overnight events  Patient states that he is unable to pass urine and this has happened to him before and resolved and 2 days of Mae    Objective:     Vitals:   Temp (24hrs), Av 1 °F (36 7 °C), Min:97 6 °F (36 4 °C), Max:98 9 °F (37 2 °C)    Temp:  [97 6 °F (36 4 °C)-98 9 °F (37 2 °C)] 97 9 °F (36 6 °C)  HR:  [62-82] 62  Resp:  [16-20] 18  BP: (100-121)/(59-74) 104/59  SpO2:  [93 %-97 %] 94 %  Body mass index is 32 74 kg/m²  Input and Output Summary (last 24 hours):      Intake/Output Summary (Last 24 hours) at 2023 1223  Last data filed at 2023 0834  Gross per 24 hour   Intake 2565 ml   Output 1150 ml   Net 1415 ml       Physical Exam: Physical Exam S1-S2 audible, regular  Lungs are to auscultation bilaterally  Alert and oriented and pleasant person to time place and person  Right lower extremity in immobilizer  No new focal neurodeficit    Additional Data:     Labs:  Results from last 7 days   Lab Units 05/02/23  0502   WBC Thousand/uL 13 40*   HEMOGLOBIN g/dL 9 1*   HEMATOCRIT % 27 9*   PLATELETS Thousands/uL 288   NEUTROS PCT % 83*   LYMPHS PCT % 5*   MONOS PCT % 12   EOS PCT % 0     Results from last 7 days   Lab Units 05/02/23  0502   SODIUM mmol/L 134*   POTASSIUM mmol/L 4 0   CHLORIDE mmol/L 102   CO2 mmol/L 22   BUN mg/dL 20   CREATININE mg/dL 1 04   ANION GAP mmol/L 10   CALCIUM mg/dL 8 8   GLUCOSE RANDOM mg/dL 163*         Results from last 7 days   Lab Units 05/02/23  1106 05/02/23  0705 05/01/23  2119 05/01/23  1853 05/01/23  1814 05/01/23  1034   POC GLUCOSE mg/dl 164* 181* 109 115 110 154*               Lines/Drains:  Invasive Devices     Peripheral Intravenous Line  Duration           Peripheral IV 05/01/23 Distal;Dorsal (posterior); Right Forearm 1 day    Peripheral IV 05/01/23 Left;Dorsal (posterior) Hand 1 day                      Imaging: No pertinent imaging reviewed      Recent Cultures (last 7 days):   Results from last 7 days   Lab Units 05/01/23  1358   GRAM STAIN RESULT  No Polys or Bacteria seen       Last 24 Hours Medication List:   Current Facility-Administered Medications   Medication Dose Route Frequency Provider Last Rate   • acetaminophen  650 mg Oral Q8H PRN Lora Rodas PA-C     • aspirin  81 mg Oral Daily Lora Rodas PA-C     • atorvastatin  40 mg Oral QAM Lora Rodas PA-C     • clopidogrel  75 mg Oral QAM Aaron Wright PA-C     • docusate sodium  100 mg Oral BID Lora Rodas PA-C     • enoxaparin  40 mg Subcutaneous Daily Lora Rodas PA-C     • escitalopram  10 mg Oral Daily Lora Rodas PA-C     • [START ON 5/3/2023] losartan  50 mg Oral Daily Livia NAILA Saravia      And   • [START ON 5/3/2023] hydrochlorothiazide  12 5 mg Oral Daily [facility] NAILA Villa     • HYDROmorphone  2 mg Oral Q6H PRN Oni Brown PA-C     • HYDROmorphone  4 mg Oral Q6H PRN Oni Brown PA-C     • HYDROmorphone  0 2 mg Intravenous Q3H PRN Oni Brown PA-C     • insulin glargine  25 Units Subcutaneous HS Solbev Ham MD     • insulin lispro  15 Units Subcutaneous TID With Meals [facility] Trev Ham MD     • insulin lispro  2-12 Units Subcutaneous 4x Daily (AC & HS) Jimbo Dhillon MD     • lactated ringers  100 mL/hr Intravenous Continuous Marlen Paul PA-C 100 mL/hr (05/02/23 0401)   • levothyroxine  150 mcg Oral QAM Aaron Wright PA-C     • pantoprazole  40 mg Oral Early Morning Marlen Paul PA-C     • tamsulosin  0 4 mg Oral Daily With Je Wright PA-C     • verapamil  360 mg Oral HS Marlen Paul PA-C     • zolpidem  10 mg Oral HS PRN Marlen Paul PA-C          Today, Patient Was Seen By: Page Braxton MD    **Please Note: This note may have been constructed using a voice recognition system  **

## 2023-05-02 NOTE — PLAN OF CARE
Problem: PAIN - ADULT  Goal: Verbalizes/displays adequate comfort level or baseline comfort level  Description: Interventions:  - Encourage patient to monitor pain and request assistance  - Assess pain using appropriate pain scale  - Administer analgesics based on type and severity of pain and evaluate response  - Implement non-pharmacological measures as appropriate and evaluate response  - Consider cultural and social influences on pain and pain management  - Notify physician/advanced practitioner if interventions unsuccessful or patient reports new pain  Outcome: Progressing     Problem: INFECTION - ADULT  Goal: Absence or prevention of progression during hospitalization  Description: INTERVENTIONS:  - Assess and monitor for signs and symptoms of infection  - Monitor lab/diagnostic results  - Monitor all insertion sites, i e  indwelling lines, tubes, and drains  - Monitor endotracheal if appropriate and nasal secretions for changes in amount and color  - Ovid appropriate cooling/warming therapies per order  - Administer medications as ordered  - Instruct and encourage patient and family to use good hand hygiene technique  - Identify and instruct in appropriate isolation precautions for identified infection/condition  Outcome: Progressing  Goal: Absence of fever/infection during neutropenic period  Description: INTERVENTIONS:  - Monitor WBC    Outcome: Progressing     Problem: SAFETY ADULT  Goal: Patient will remain free of falls  Description: INTERVENTIONS:  - Educate patient/family on patient safety including physical limitations  - Instruct patient to call for assistance with activity   - Consult OT/PT to assist with strengthening/mobility   - Keep Call bell within reach  - Keep bed low and locked with side rails adjusted as appropriate  - Keep care items and personal belongings within reach  - Initiate and maintain comfort rounds  - Make Fall Risk Sign visible to staff  - Offer Toileting every 2 Hours, in advance of need  - Initiate/Maintain bed alarm  - Obtain necessary fall risk management equipment:   - Apply yellow socks and bracelet for high fall risk patients  - Consider moving patient to room near nurses station  Outcome: Progressing  Goal: Maintain or return to baseline ADL function  Description: INTERVENTIONS:  -  Assess patient's ability to carry out ADLs; assess patient's baseline for ADL function and identify physical deficits which impact ability to perform ADLs (bathing, care of mouth/teeth, toileting, grooming, dressing, etc )  - Assess/evaluate cause of self-care deficits   - Assess range of motion  - Assess patient's mobility; develop plan if impaired  - Assess patient's need for assistive devices and provide as appropriate  - Encourage maximum independence but intervene and supervise when necessary  - Involve family in performance of ADLs  - Assess for home care needs following discharge   - Consider OT consult to assist with ADL evaluation and planning for discharge  - Provide patient education as appropriate  Outcome: Progressing  Goal: Maintains/Returns to pre admission functional level  Description: INTERVENTIONS:  - Perform BMAT or MOVE assessment daily    - Set and communicate daily mobility goal to care team and patient/family/caregiver  - Collaborate with rehabilitation services on mobility goals if consulted  - Perform Range of Motion 3 times a day  - Reposition patient every 2 hours    - Dangle patient 3 times a day  - Stand patient 3 times a day  - Ambulate patient 3 times a day  - Out of bed to chair 3 times a day   - Out of bed for meals 3 times a day  - Out of bed for toileting  - Record patient progress and toleration of activity level   Outcome: Progressing     Problem: DISCHARGE PLANNING  Goal: Discharge to home or other facility with appropriate resources  Description: INTERVENTIONS:  - Identify barriers to discharge w/patient and caregiver  - Arrange for needed discharge resources and transportation as appropriate  - Identify discharge learning needs (meds, wound care, etc )  - Arrange for interpretive services to assist at discharge as needed  - Refer to Case Management Department for coordinating discharge planning if the patient needs post-hospital services based on physician/advanced practitioner order or complex needs related to functional status, cognitive ability, or social support system  Outcome: Progressing     Problem: Knowledge Deficit  Goal: Patient/family/caregiver demonstrates understanding of disease process, treatment plan, medications, and discharge instructions  Description: Complete learning assessment and assess knowledge base    Interventions:  - Provide teaching at level of understanding  - Provide teaching via preferred learning methods  Outcome: Progressing     Problem: SKIN/TISSUE INTEGRITY - ADULT  Goal: Skin Integrity remains intact(Skin Breakdown Prevention)  Description: Assess:  -Perform Fabrice assessment every   -Clean and moisturize skin every   -Inspect skin when repositioning, toileting, and assisting with ADLS  -Assess under medical devices such as  every   -Assess extremities for adequate circulation and sensation     Bed Management:  -Have minimal linens on bed & keep smooth, unwrinkled  -Change linens as needed when moist or perspiring  -Avoid sitting or lying in one position for more than  hours while in bed  -Keep HOB at degrees     Toileting:  -Offer bedside commode  -Assess for incontinence every   -Use incontinent care products after each incontinent episode such as     Activity:  -Mobilize patient  times a day  -Encourage activity and walks on unit  -Encourage or provide ROM exercises   -Turn and reposition patient every 2 Hours  -Use appropriate equipment to lift or move patient in bed  -Instruct/ Assist with weight shifting every  when out of bed in chair  -Consider limitation of chair time  hour intervals    Skin Care:  -Avoid use of baby powder, tape, friction and shearing, hot water or constrictive clothing  -Relieve pressure over bony prominences using   -Do not massage red bony areas    Next Steps:  -Teach patient strategies to minimize risks such as    -Consider consults to  interdisciplinary teams such as   Outcome: Progressing  Goal: Incision(s), wounds(s) or drain site(s) healing without S/S of infection  Description: INTERVENTIONS  - Assess and document dressing, incision, wound bed, drain sites and surrounding tissue  - Provide patient and family education  - Perform skin care/dressing changes every   Outcome: Progressing  Goal: Pressure injury heals and does not worsen  Description: Interventions:  - Implement low air loss mattress or specialty surface (Criteria met)  - Apply silicone foam dressing  - Instruct/assist with weight shifting every  minutes when in chair   - Limit chair time to  hour intervals  - Use special pressure reducing interventions such as  when in chair   - Apply fecal or urinary incontinence containment device   - Perform passive or active ROM every   - Turn and reposition patient & offload bony prominences every  hours   - Utilize friction reducing device or surface for transfers   - Consider consults to  interdisciplinary teams such as   - Use incontinent care products after each incontinent episode such as   - Consider nutrition services referral as needed  Outcome: Progressing     Problem: Prexisting or High Potential for Compromised Skin Integrity  Goal: Skin integrity is maintained or improved  Description: INTERVENTIONS:  - Identify patients at risk for skin breakdown  - Assess and monitor skin integrity  - Assess and monitor nutrition and hydration status  - Monitor labs   - Assess for incontinence   - Turn and reposition patient  - Assist with mobility/ambulation  - Relieve pressure over bony prominences  - Avoid friction and shearing  - Provide appropriate hygiene as needed including keeping skin clean and dry  - Evaluate need for skin moisturizer/barrier cream  - Collaborate with interdisciplinary team   - Patient/family teaching  - Consider wound care consult   Outcome: Progressing     Problem: MOBILITY - ADULT  Goal: Maintain or return to baseline ADL function  Description: INTERVENTIONS:  -  Assess patient's ability to carry out ADLs; assess patient's baseline for ADL function and identify physical deficits which impact ability to perform ADLs (bathing, care of mouth/teeth, toileting, grooming, dressing, etc )  - Assess/evaluate cause of self-care deficits   - Assess range of motion  - Assess patient's mobility; develop plan if impaired  - Assess patient's need for assistive devices and provide as appropriate  - Encourage maximum independence but intervene and supervise when necessary  - Involve family in performance of ADLs  - Assess for home care needs following discharge   - Consider OT consult to assist with ADL evaluation and planning for discharge  - Provide patient education as appropriate  Outcome: Progressing  Goal: Maintains/Returns to pre admission functional level  Description: INTERVENTIONS:  - Perform BMAT or MOVE assessment daily    - Set and communicate daily mobility goal to care team and patient/family/caregiver  - Collaborate with rehabilitation services on mobility goals if consulted  - Perform Range of Motion 3 times a day  - Reposition patient every 2 hours    - Dangle patient 3 times a day  - Stand patient 3 times a day  - Ambulate patient 2 times a day  - Out of bed to chair 3 times a day   - Out of bed for meals 3 times a day  - Out of bed for toileting  - Record patient progress and toleration of activity level   Outcome: Progressing

## 2023-05-02 NOTE — CONSULTS
Consult - Urology   Dale Maharaj 1965, 59 y o  male MRN: [id number]    Unit/Bed#: -01 Encounter: [id number]        Assessment & Plan:  1  Urinary retention  - Postop day 1 ORIF right tibial plateau fracture  - Since procedure patient has been reporting he has not been able to urinate, does not have the urge  - Most recent bladder scan at 5 AM was 122 mL  Previous scan of 40 mL  - VSS, afebrile  - Mild leukocytosis 13 4  - Creatinine at baseline  - Patient managed on Dilaudid  - Limit opioids  - Continue Flomax  - Continue postop care, increasing functional status, working with PT   - Patient reports history of urinary retention requiring Mae catheter placement in 2 previous hospitalizations  - Patient reports he would not allow any straight cath attempts  - Discussed with patient urinary retention protocol  He is not interested and conservative measures with straight caths, would only want Mae catheter placed if he is retaining   - Most recent bladder scan 297  Monitor bladder scans this morning, if patient fails urinary retention protocol, Mae catheter can be placed  - Discussed with patient once Mae catheter is placed, likely stay for 5 to 10 days and removed in rehab for trial of void  - Outpatient follow up   -Urology will sign off at this time  Please do not hesitate to reach out with any questions or concerns  Subjective:   Dale Maharaj is a 20-year-old male with admitted to orthopedic service due to tibial plateau fracture  Patient with history of uncontrolled diabetes, PAD, CKD  Patient is postop day 1 ORIF right tibial plateau fracture  Patient reports having difficulty urinating after the procedure  He has been straight cathed 2 times overnight  PVRs have been most recent 122, previous scan 40 mL  Patient seen in urologic consultation in 2020 for urinary retention, patient required Mae catheter placement and subsequent outpatient trial of void    Patient "reports on his prior hospitalization as well he did develop urinary retention, requiring Mae catheter placement  He is not interested in straight cath attempts  He denies any hematuria, abdominal pain, suprapubic pain  He denies any outpatient follow-up with urology  Review of Systems   Constitutional: Negative for chills and fever  Respiratory: Negative for cough and shortness of breath  Cardiovascular: Negative for chest pain and palpitations  Gastrointestinal: Negative for abdominal pain and vomiting  Genitourinary: Negative for dysuria and hematuria  Musculoskeletal: Negative for arthralgias and back pain  Skin: Negative for color change and rash  Neurological: Negative for seizures and syncope  All other systems reviewed and are negative  Objective:  Vitals: Blood pressure 104/59, pulse 62, temperature 97 9 °F (36 6 °C), resp  rate 18, height 6' 4\" (1 93 m), weight 122 kg (268 lb 15 4 oz), SpO2 94 %  ,Body mass index is 32 74 kg/m²  Physical Exam  Constitutional:       General: He is not in acute distress  Appearance: Normal appearance  He is normal weight  He is not ill-appearing or toxic-appearing  HENT:      Head: Normocephalic and atraumatic  Right Ear: External ear normal       Left Ear: External ear normal       Nose: Nose normal       Mouth/Throat:      Mouth: Mucous membranes are moist    Eyes:      General: No scleral icterus  Conjunctiva/sclera: Conjunctivae normal    Cardiovascular:      Rate and Rhythm: Normal rate  Pulses: Normal pulses  Pulmonary:      Effort: Pulmonary effort is normal    Abdominal:      General: Abdomen is flat  There is no distension  Palpations: Abdomen is soft  Tenderness: There is no abdominal tenderness  There is no guarding  Musculoskeletal:      Cervical back: Normal range of motion  Skin:     General: Skin is warm  Findings: No rash  Neurological:      General: No focal deficit present        " Mental Status: He is alert and oriented to person, place, and time  Mental status is at baseline  Psychiatric:         Mood and Affect: Mood normal          Behavior: Behavior normal          Thought Content: Thought content normal          Judgment: Judgment normal        Labs:  Recent Labs     05/02/23  0502   WBC 13 40*     Recent Labs     05/01/23  1703 05/02/23  0502   HGB 10 2* 9 1*     Recent Labs     05/02/23  0502   CREATININE 1 04       History:  Social History     Socioeconomic History   • Marital status: Single     Spouse name: None   • Number of children: None   • Years of education: None   • Highest education level: None   Occupational History   • None   Tobacco Use   • Smoking status: Never   • Smokeless tobacco: Never   Vaping Use   • Vaping Use: Never used   Substance and Sexual Activity   • Alcohol use: Not Currently     Comment: quit 1-2023   • Drug use: Not Currently     Types: Marijuana   • Sexual activity: Not Currently     Partners: Female   Other Topics Concern   • None   Social History Narrative    Light caffeine     Wears seatbelt daily    Lives with girlfriend     Social Determinants of Health     Financial Resource Strain: Not on file   Food Insecurity: No Food Insecurity   • Worried About Running Out of Food in the Last Year: Never true   • Ran Out of Food in the Last Year: Never true   Transportation Needs: No Transportation Needs   • Lack of Transportation (Medical): No   • Lack of Transportation (Non-Medical):  No   Physical Activity: Not on file   Stress: Not on file   Social Connections: Not on file   Intimate Partner Violence: Not on file   Housing Stability: Low Risk    • Unable to Pay for Housing in the Last Year: No   • Number of Places Lived in the Last Year: 1   • Unstable Housing in the Last Year: No     Past Medical History:   Diagnosis Date   • [facility] 01/2023   • Chronic kidney disease    • Colon polyp    • Constipation 03/09/2023   • Diabetes mellitus ([facility] )    • Disease of thyroid gland    • GERD (gastroesophageal reflux disease)    • HHS vs DKA 11/16/2018   • Hyperlipidemia    • Hypertension    • Thyroid cancer [facility])      Past Surgical History:   Procedure Laterality Date   • COLONOSCOPY     • FOOT AMPUTATION Right    • FOOT SURGERY Right 2014   • IR AORTAGRAM WITH RUN-OFF  08/06/2020   • IR TUNNELED CENTRAL LINE PLACEMENT  09/08/2020   • IR TUNNELED CENTRAL LINE REMOVAL  09/28/2020   • DE AMPUTATION FOOT TRANSMETARSAL Right 09/03/2020    Procedure: AMPUTATION TRANSMETATARSAL (TMA);  Surgeon: Roby Solano DPM;  Location: [facility];  Service: Podiatry   • DE OPEN [place] UNICONDYLAR Right 5/1/2023    Procedure: OPEN REDUCTION W/ INTERNAL FIXATION (ORIF) RIGHT TIBIAL PLATEAU NONUNION;  Surgeon: Floyd Alvarez MD;  Location: [facility];  Service: Orthopedics   • DE THYROIDECTOMY TOTAL/COMPLETE N/A 02/03/2021    Procedure: TOTAL THYROIDECTOMY WITH NIMS MONITORING;  Surgeon: Shagufta De La Cruz MD;  Location[facility];  Service: ENT   • TOE AMPUTATION Right 08/11/2020    Procedure: AMPUTATION TOE;  Surgeon: Roby Solano DPM;  [facility]: [facility];  Service: Podiatry   • US GUIDED THYROID BIOPSY  07/02/2020     Family History   Problem Relation Age of Onset   • Cancer Mother    • Hypertension Father        Corbin Reid  Date: 5/2/2023 Time: 8:37 AM

## 2023-05-02 NOTE — ASSESSMENT & PLAN NOTE
"· /59   Pulse 62   Temp 97 9 °F (36 6 °C)   Resp 18   Ht 6' 4\" (1 93 m)   Wt 122 kg (268 lb 15 4 oz)   SpO2 94%   BMI 32 74 kg/m²   · Stable pressures   Continue home HCTZ/irbesartan, verapamil  · Monitor BP  "

## 2023-05-02 NOTE — ASSESSMENT & PLAN NOTE
· S/p reconstruction of proximal tibia today however may require cement spacer as well as possible external fixator placement  · Pain management per primary team  Ortho primary

## 2023-05-02 NOTE — PROGRESS NOTES
Progress Note - Orthopedics   Audry Prader 59 y o  male MRN: [id number]  Unit/Bed#: -01      Subjective:    59 y o male POD#1 ORIF right tibial plateau nonunion  Patient states he does continue to have severe pain in his right knee that is made worse with direct contact  Patient states that his pain is diffuse throughout the knee  Patient states he has been maintaining his knee immobilizer as directed with no complaints  Patient does admit to having numbness over the anterior aspect of the knee  Patient denies any fevers any chills  Patient denies any shortness of breath chest tightness chest pain  Patient is unsure if he has passed gas since surgery  Patient is having difficulty urinating and has received to straight cath since being on the floor  Patient has a history of similar presentation in previous hospitalization  Patient offers no other complaints at this time           Labs:  0   Lab Value Date/Time    HCT 27 9 (L) 05/02/2023 0502    HCT 30 (L) 05/01/2023 1703    HCT 34 8 (L) 04/24/2023 1206    HCT 30 2 (L) 03/07/2023 0542    HGB 9 1 (L) 05/02/2023 0502    HGB 10 2 (L) 05/01/2023 1703    HGB 11 4 (L) 04/24/2023 1206    HGB 10 3 (L) 03/07/2023 0542    INR 1 04 08/04/2020 1358    WBC 13 40 (H) 05/02/2023 0502    WBC 10 33 (H) 04/24/2023 1206    WBC 4 80 03/07/2023 0542    ESR 24 (H) 02/21/2020 1101       Meds:    Current Facility-Administered Medications:   •  acetaminophen (TYLENOL) tablet 650 mg, 650 mg, Oral, Q8H PRN, Aleta Pace PA-C  •  aspirin chewable tablet 81 mg, 81 mg, Oral, Daily, Aaron Wright PA-C  •  atorvastatin (LIPITOR) tablet 40 mg, 40 mg, Oral, QAM, Aaron Wright PA-C  •  clopidogrel (PLAVIX) tablet 75 mg, 75 mg, Oral, QAM, Aaron Wright PA-C  •  docusate sodium (COLACE) capsule 100 mg, 100 mg, Oral, BID, Aaron Wright PA-C, 100 mg at 05/01/23 2120  •  enoxaparin (LOVENOX) subcutaneous injection 40 mg, 40 mg, Subcutaneous, Daily, Pepe Larson PA-C, 40 mg at 05/02/23 0453  •  escitalopram (LEXAPRO) tablet 10 mg, 10 mg, Oral, Daily, Pepe Larson PA-C  •  losartan (COZAAR) tablet 50 mg, 50 mg, Oral, Daily **AND** hydrochlorothiazide (HYDRODIURIL) tablet 12 5 mg, 12 5 mg, Oral, Daily, Aaron Wright PA-C  •  HYDROmorphone (DILAUDID) tablet 2 mg, 2 mg, Oral, Q6H PRN, Lauren Gomes PA-C  •  HYDROmorphone (DILAUDID) tablet 4 mg, 4 mg, Oral, Q6H PRN, Lauren Gomes PA-C, 4 mg at 05/02/23 0208  •  HYDROmorphone HCl (DILAUDID) injection 0 2 mg, 0 2 mg, Intravenous, Q3H PRN, Lauren Gomes PA-C, 0 2 mg at 05/02/23 0405  •  insulin glargine (LANTUS) subcutaneous injection 25 Units 0 25 mL, 25 Units, Subcutaneous, HS, Sol Randhawa MD, 25 Units at 05/01/23 2119  •  insulin lispro (HumaLOG) 100 units/mL subcutaneous injection 15 Units, 15 Units, Subcutaneous, TID With Meals, Jeramy Genao MD  •  insulin lispro (HumaLOG) 100 units/mL subcutaneous injection 2-12 Units, 2-12 Units, Subcutaneous, 4x Daily (AC & HS) **AND** Fingerstick Glucose (POCT), , , 4x Daily AC and at bedtime, Jeramy Genao MD  •  lactated ringers infusion, 100 mL/hr, Intravenous, Continuous, Pepe Larson PA-C, Last Rate: 100 mL/hr at 05/02/23 0401, 100 mL/hr at 05/02/23 0401  •  levothyroxine tablet 150 mcg, 150 mcg, Oral, QAM, Aaron Wright PA-C  •  pantoprazole (PROTONIX) EC tablet 40 mg, 40 mg, Oral, Early Morning, Aaron Wright PA-C, 40 mg at 05/02/23 0503  •  tamsulosin (FLOMAX) capsule 0 4 mg, 0 4 mg, Oral, Daily With Dinner, Pepe Larson PA-C, 0 4 mg at 05/01/23 2120  •  verapamil (CALAN-SR) CR tablet 360 mg, 360 mg, Oral, HS, Aaron Wright PA-C  •  zolpidem (AMBIEN) tablet 10 mg, 10 mg, Oral, HS PRN, Pepe Larson PA-C    Blood Culture:   Lab Results   Component Value Date    BLOODCX No Growth After 5 Days  10/10/2020    BLOODCX No Growth After 5 Days   10/10/2020       Wound Culture:   Lab Results   Component Value Date    WOUNDCULT 2+ Growth of Morganella morganii (A) 08/30/2020    WOUNDCULT (A) 08/30/2020     2+ Growth of Methicillin Resistant Staphylococcus aureus    WOUNDCULT 1+ Growth of Enterobacter cloacae complex (A) 08/30/2020    WOUNDCULT 1+ Growth of Enterococcus faecalis (A) 08/30/2020       Ins and Outs:  I/O last 24 hours: In: 7833 [I V :2125; IV Piggyback:80]  Out: 1150 [Urine:650; Blood:500]          Physical:  Vitals:    05/02/23 0707   BP: 104/59   Pulse:    Resp: 18   Temp: 97 9 °F (36 6 °C)   SpO2:      Musculoskeletal: right Lower Extremity  · Patient resting comfortably in hospital bed in no acute distress  · Skin  : No acute visible abnormalities present to the right lower extremity  Extremity appears well-perfused overall    No erythema or ecchymosis  · Dressing  : Mild serosanguineous drainage present in the lateral aspect of the lower extremity  · TTP  : Tender to palpation noted over the medial and lateral aspect of the knee  No other bony or soft tissue tenderness palpation noted at this time  · Sensation intact to saphenous, sural, tibial, superficial peroneal nerve, and deep peroneal  · Motor intact ton+ankle dorsi/plantar flexion  · 2+ DP pulse, symmetric bilaterally  · Digits warm and well perfused  · Capillary refill < 2 seconds      Assessment:    58 y o male POD#1 ORIF right tibial plateau nonunion   Urinary retention  Plan:  · NWB right lower extremity  · Maintain knee immobilizer at all times   · Dressings reinforced at this time  Consider changing if dressings become saturated additional time  · Will monitor for ABLA and administer IVF/prbc as indicated for Greater than 2 gram drop or Hgb < 7  · PT/OT for ambulation assistance  /  Gait training   · Patient about not participate in physical therapy today or near future secondary to pain in his knee  Discussed potential negative consequences of not participating in physical therapy  "Patient was understanding  · Pain control as directed   · DVT ppx  : Lovenox and Plavix  · Dispo: Ortho will follow  See above for additional recommendations  Urology consult placed for urinary retention  PVR and urinary retention protocol implemented per PANKAJ Marroquin PA-C             Portions of the record may have been created with voice recognition software  Occasional wrong word or \"sound a like\" substitutions may have occurred due to the inherent limitations of voice recognition software  Read the chart carefully and recognize, using context, where substitutions have occurred            "

## 2023-05-02 NOTE — CONSULTS
NEPHROLOGY CONSULTATION NOTE    Patient: Beto Frederick               Sex: male          DOA: 5/1/2023  9:52 AM   YOB: 1965        Age:  59 y o         LOS:  LOS: 1 day         [address] / CONSULTATION: CKD stage III, MARKELL risk reduction with orthopedic surgery    DATE OF CONSULTATION / SERVICE: 5/2/2023    ADMISSION DIAGNOSIS: Closed fracture of medial plateau of right tibia     CHIEF COMPLAINT     Right tibial fracture    HPI     Beto Frederick is a 59 y o  male  with a past medical history of CKD stage III, hypertension, type 2 diabetes, peripheral arterial disease, and prior Goprelto fracture of the right tibia status post repair yesterday  A renal consultation is requested today for assistance in the management of chronic kidney disease stage III and MARKELL risk reduction  He has history of chronic kidney disease with recent MARKELL in March 2023 as well as back in September 2020  After review of the medical record, it does appear that baseline creatinine fluctuates around 0 9-1 1  He underwent ORIF of the right tibial plateau nonunion with orthopedics yesterday  According to the record, documented 500 mL of blood loss patient did require Levophed for blood pressure support during the procedure  Reviewed past 24 hour events      PAST MEDICAL HISTORY     Past Medical History:   Diagnosis Date   • Broken internal right knee prosthesis ([address] ) 01/2023   • Chronic kidney disease    • Colon polyp    • Constipation 03/09/2023   • Diabetes mellitus ([address] )    • Disease of thyroid gland    • GERD (gastroesophageal reflux disease)    • HHS vs DKA 11/16/2018   • Hyperlipidemia    • Hypertension    • Thyroid cancer ([address] )        PAST SURGICAL HISTORY     Past Surgical History:   Procedure Laterality Date   • COLONOSCOPY     • FOOT AMPUTATION Right    • FOOT SURGERY Right 2014   • IR AORTAGRAM WITH RUN-OFF  08/06/2020   • IR TUNNELED CENTRAL LINE PLACEMENT  09/08/2020   • IR TUNNELED CENTRAL LINE REMOVAL  09/28/2020   • OK AMPUTATION FOOT TRANSMETARSAL Right 09/03/2020    Procedure: AMPUTATION TRANSMETATARSAL (TMA);  Surgeon: Shyanne Soliman DPM;  Location: [facility];  Service: Podiatry   • OK OPEN [place] UNICONDYLAR Right 5/1/2023    Procedure: OPEN REDUCTION W/ INTERNAL FIXATION (ORIF) RIGHT TIBIAL PLATEAU NONUNION;  Surgeon: Ema Bills MD;  Location: [facility];  Service: Orthopedics   • OK THYROIDECTOMY TOTAL/COMPLETE N/A 02/03/2021    Procedure: TOTAL THYROIDECTOMY WITH NIMS MONITORING;  Surgeon: Acosta Iniguez MD;  Location: [facility];  Service: ENT   • TOE AMPUTATION Right 08/11/2020    Procedure: AMPUTATION TOE;  Surgeon: Shyanne Soliman DPM;  Location: [facility];  Service: Podiatry   • US GUIDED THYROID BIOPSY  07/02/2020       ALLERGIES     Allergies   Allergen Reactions   • Pollen Extract Eye Swelling     Eyes get watery        SOCIAL HISTORY     Social History     Substance and Sexual Activity   Alcohol Use Not Currently    Comment: quit 1-2023     Social History     Substance and Sexual Activity   Drug Use Not Currently   • Types: Marijuana     Social History     Tobacco Use   Smoking Status Never   Smokeless Tobacco Never       FAMILY HISTORY     Family History   Problem Relation Age of Onset   • Cancer Mother    • Hypertension Father        CURRENT MEDICATIONS       Current Facility-Administered Medications:   •  acetaminophen (TYLENOL) tablet 650 mg, 650 mg, Oral, Q8H PRN, Day Russo PA-C  •  aspirin chewable tablet 81 mg, 81 mg, Oral, Daily, Day Russo PA-C, 81 mg at 05/02/23 1720  •  atorvastatin (LIPITOR) tablet 40 mg, 40 mg, Oral, QAM, Aaron Wright PA-C, 40 mg at 05/02/23 4161  •  clopidogrel (PLAVIX) tablet 75 mg, 75 mg, Oral, QAM, Aaron Wright PA-C, 75 mg at 05/02/23 4528  •  docusate sodium (COLACE) capsule 100 mg, 100 mg, Oral, BID, Aaron Wright PA-C, 100 mg at 05/02/23 0829  •  enoxaparin (LOVENOX) subcutaneous injection 40 mg, 40 mg, Subcutaneous, Daily, JavierKATE Cardenas-C, 40 mg at 05/02/23 0453  •  escitalopram (LEXAPRO) tablet 10 mg, 10 mg, Oral, Daily, HewettKATE Cardenas-SABRA, 10 mg at 05/02/23 9702  •  losartan (COZAAR) tablet 50 mg, 50 mg, Oral, Daily, 50 mg at 05/02/23 0829 **AND** hydrochlorothiazide (HYDRODIURIL) tablet 12 5 mg, 12 5 mg, Oral, Daily, Aaron Wright PA-C, 12 5 mg at 05/02/23 4504  •  HYDROmorphone (DILAUDID) tablet 2 mg, 2 mg, Oral, Q6H PRN, Robinson Shaw PA-C  •  HYDROmorphone (DILAUDID) tablet 4 mg, 4 mg, Oral, Q6H PRN, Robinson Shaw PA-C, 4 mg at 05/02/23 0208  •  HYDROmorphone HCl (DILAUDID) injection 0 2 mg, 0 2 mg, Intravenous, Q3H PRN, Robinson Shaw PA-C, 0 2 mg at 05/02/23 0405  •  insulin glargine (LANTUS) subcutaneous injection 25 Units 0 25 mL, 25 Units, Subcutaneous, HS, Sol Escoto MD, 25 Units at 05/01/23 2119  •  insulin lispro (HumaLOG) 100 units/mL subcutaneous injection 15 Units, 15 Units, Subcutaneous, TID With Meals, Jose Falk MD  •  insulin lispro (HumaLOG) 100 units/mL subcutaneous injection 2-12 Units, 2-12 Units, Subcutaneous, 4x Daily (AC & HS), 2 Units at 05/02/23 0851 **AND** Fingerstick Glucose (POCT), , , 4x Daily AC and at bedtime, Jose Falk MD  •  lactated ringers infusion, 100 mL/hr, Intravenous, Continuous, Javier Thrasher PA-C, Last Rate: 100 mL/hr at 05/02/23 0401, 100 mL/hr at 05/02/23 0401  •  levothyroxine tablet 150 mcg, 150 mcg, Oral, QAM, Aaron Wright PA-C, 150 mcg at 05/02/23 3453  •  pantoprazole (PROTONIX) EC tablet 40 mg, 40 mg, Oral, Early Morning, Aaron Wright PA-C, 40 mg at 05/02/23 0503  •  tamsulosin (FLOMAX) capsule 0 4 mg, 0 4 mg, Oral, Daily With Dinner, Javier Thrasher PA-C, 0 4 mg at 05/01/23 2120  •  verapamil (CALAN-SR) CR tablet 360 mg, 360 mg, Oral, HS, Aaron Wright PA-C  •  zolpidem (AMBIEN) tablet 10 mg, 10 mg, Oral, HS PRN, [facility] GABRIEL Wright    REVIEW OF SYSTEMS   Review of Systems   Constitutional: Negative for activity change, chills, fatigue and fever  HENT: Negative for hearing loss, nosebleeds and trouble swallowing  Respiratory: Negative for cough and shortness of breath  Cardiovascular: Negative for chest pain and leg swelling  Gastrointestinal: Negative for constipation, diarrhea, nausea and vomiting  Genitourinary: Positive for difficulty urinating  Negative for dysuria, frequency and hematuria  Musculoskeletal: Negative for back pain  Right lower extremity pain   Skin: Negative for pallor  Neurological: Negative for dizziness, syncope, weakness and light-headedness  Psychiatric/Behavioral: Negative for sleep disturbance  The patient is not nervous/anxious  OBJECTIVE     Current Weight: Weight - Scale: 122 kg (268 lb 15 4 oz)  Vitals:    05/02/23 0707   BP: 104/59   Pulse:    Resp: 18   Temp: 97 9 °F (36 6 °C)   SpO2:      Body mass index is 32 74 kg/m²  Intake/Output Summary (Last 24 hours) at 5/2/2023 6017  Last data filed at 5/1/2023 2120  Gross per 24 hour   Intake 2205 ml   Output 1150 ml   Net 1055 ml       PHYSICAL EXAMINATION     Physical Exam  Vitals reviewed  Constitutional:       General: He is not in acute distress  HENT:      Head: Normocephalic  Mouth/Throat:      Lips: Pink  Mouth: Mucous membranes are moist    Eyes:      General: Lids are normal  No scleral icterus  Cardiovascular:      Rate and Rhythm: Normal rate and regular rhythm  Heart sounds: S1 normal and S2 normal  No murmur heard  Pulmonary:      Effort: Pulmonary effort is normal  No accessory muscle usage or respiratory distress  Breath sounds: Normal breath sounds  Abdominal:      General: There is no distension  Tenderness: There is no abdominal tenderness  Musculoskeletal:      Cervical back: Normal range of motion and neck supple  No tenderness  Comments:  Ace wrap and immobilizer to right lower extremity   Skin:     General: Skin is warm  Coloration: Skin is not cyanotic or jaundiced  Neurological:      General: No focal deficit present  Mental Status: He is alert and oriented to person, place, and time  Psychiatric:         Attention and Perception: Attention normal          Speech: Speech normal          Behavior: Behavior is cooperative  LAB RESULTS        Results from last 7 days   Lab Units 05/02/23  0502 05/01/23  1703   WBC Thousand/uL 13 40*  --    HEMOGLOBIN g/dL 9 1*  --    I STAT HEMOGLOBIN g/dl  --  10 2*   HEMATOCRIT % 27 9*  --    HEMATOCRIT, ISTAT %  --  30*   PLATELETS Thousands/uL 288  --    POTASSIUM mmol/L 4 0  --    CHLORIDE mmol/L 102  --    CO2 mmol/L 22  --    CO2, I-STAT mmol/L  --  25   BUN mg/dL 20  --    CREATININE mg/dL 1 04  --    EGFR ml/min/1 73sq m 77  --    CALCIUM mg/dL 8 8  --    GLUCOSE, ISTAT mg/dl  --  109       Recent Labs     05/02/23  0502   WBC 13 40*     Recent Labs     05/02/23  0502   HGB 9 1*     Recent Labs     05/02/23  0502   HCT 27 9*     Recent Labs     05/02/23  0502        Recent Labs     05/02/23  0502   SODIUM 134*     Recent Labs     05/02/23  0502   K 4 0     Recent Labs     05/02/23  0502        Recent Labs     05/02/23  0502   CO2 22     Recent Labs     05/02/23  0502   BUN 20     Recent Labs     05/02/23  0502   CREATININE 1 04     Recent Labs     05/02/23  0502   EGFR 77     Recent Labs     05/02/23  0502   CALCIUM 8 8     No results for input(s): MG in the last 72 hours  No results for input(s): PHOS in the last 72 hours  Invalid input(s): ALBUMIN  No results for input(s): PROT in the last 72 hours  Recent Labs     05/01/23  1703   GLUCOSE 109       RADIOLOGY RESULTS     No results found for this or any previous visit      Results for orders placed during the hospital encounter of 03/03/23    XR chest 2 views    Narrative  CHEST    INDICATION:   diminished L lung sounds  COMPARISON:  11/15/2018    EXAM PERFORMED/VIEWS:  XR CHEST PA & LATERAL      FINDINGS:    Marked patient rotation  Cardiomediastinal silhouette appears unremarkable  The lungs are clear  No pneumothorax or pleural effusion  Osseous structures appear within normal limits for patient age  Impression  No acute cardiopulmonary disease  PLAN / RECOMMENDATIONS      70-year-old male with a past medical history of CKD stage III, hypertension, type 2 diabetes, peripheral arterial disease, and prior Goprelto fracture of the right tibia status post repair yesterday  Chronic kidney disease stage II/MARKELL risk reduction:  After review of the medical record, it appears that baseline creatinine fluctuates from 0 9-1 1  He did have prior episodes of acute kidney injury in the past, but was admitted for procedure with creatinine level within suspected baseline  Etiology of CKD likely secondary to longstanding history of hypertension and diabetes  He underwent ORIF of the right tibial plateau yesterday, current creatinine today is 1 04 with an EGFR of 77 and appears improved from prior baseline  Recommend avoidance of nephrotoxic agents such as NSAIDs for pain management  Maintain MAP > 65 to optimize renal perfusion hold parameters adjusted on blood pressure medications as below  Recommend monitoring daily BMP while hospitalized  Hypertension:  Currently maintained on losartan 50 mg once daily, hydrochlorothiazide 12 5 mg once daily, and verapamil 360 mg once daily  Hydrochlorothiazide and losartan restarted today, creatinine levels within suspected baseline  We will raise hold parameters on Losartan-HCTZ to optimize renal perfusion and avoid hypotension in the postop phase  Urinary retention:  Difficulty voiding and seen by urology today with plans for urinary retention protocol and placement of Mae catheter if indicated      Anemia:  Most recent hemoglobin 9 1 following orthopedic "surgery yesterday  Continue to monitor, would recommend transfusion if hemoglobin less than 7 0    Thank you for the consultation to participate in patient's care  I have discussed this plan with my attending physician  [address]    5/2/2023      Portions of the record may have been created with voice recognition software  Occasional wrong word or \"sound a like\" substitutions may have occurred due to the inherent limitations of voice recognition software  Read the chart carefully and recognize, using context, where substitutions have occurred      "

## 2023-05-02 NOTE — UTILIZATION REVIEW
"Initial Clinical Review    Elective  [address] surgical procedure  Age/Sex: 59 y o  male  Surgery Date: 5/1/23  Procedure: ORIF right tibial plateau nonunion  Anesthesia: general with ASA Monitors  POD#1 Progress Note: Continues with diffuse knee pain  Medicate as needed  KNee immobilizer in place  NWB to RLE  Dressing reinforced  PT/OT for ambulation  Urology consult:  Urinary retention since surgery  Mild leukocytosis 13  4  straight cathed 2 times overnight  PVRs have been most recent 122, previous scan 40 mL  Patient seen in urologic consultation in 2020 for urinary retention  Denies hematuria  Nephrology consult:  H/O CKDIII , documented 500 mL of blood loss patient did require Levophed for blood pressure support during the procedure  Ace wrap and immobilizer to RLE   MARKELL risk reduction  Etiology of CKD likely due to longstanding h/o htn, dm  Admission Orders: Date/Time/Statement:   Admission Orders (From admission, onward)     Ordered        05/01/23 1626  Inpatient Admission  Once                      Orders Placed This Encounter   Procedures   • Inpatient Admission     Standing Status:   Standing     Number of Occurrences:   1     Order Specific Question:   Level of Care     Answer:   Med Surg [16]     Order Specific Question:   Estimated length of stay     Answer:   Inpatient Only Surgery     Vital Signs: /59   Pulse 62   Temp 97 9 °F (36 6 °C)   Resp 18   Ht 6' 4\" (1 93 m)   Wt 122 kg (268 lb 15 4 oz)   SpO2 94%   BMI 32 74 kg/m²     Pertinent Labs/Diagnostic Test Results:   XR knee 1 or 2 vw right   Final Result by Dereje Romero MD (05/01 1907)      Fluoroscopic guidance provided for procedure guidance  Please refer to the separate procedure notes for additional details           Workstation performed: [id number]               Results from last 7 days   Lab Units 05/02/23  0502 05/01/23  1703   WBC Thousand/uL 13 40*  --    HEMOGLOBIN g/dL 9 1*  --    I STAT " HEMOGLOBIN g/dl  --  10 2*   HEMATOCRIT % 27 9*  --    HEMATOCRIT, ISTAT %  --  30*   PLATELETS Thousands/uL 288  --    NEUTROS ABS Thousands/µL 11 05*  --          Results from last 7 days   Lab Units 05/02/23  0502 05/01/23  1703   SODIUM mmol/L 134*  --    POTASSIUM mmol/L 4 0  --    CHLORIDE mmol/L 102  --    CO2 mmol/L 22  --    CO2, I-STAT mmol/L  --  25   ANION GAP mmol/L 10  --    BUN mg/dL 20  --    CREATININE mg/dL 1 04  --    EGFR ml/min/1 73sq m 77  --    CALCIUM mg/dL 8 8  --    CALCIUM, IONIZED, ISTAT mmol/L  --  1 20         Results from last 7 days   Lab Units 05/02/23  0705 05/01/23  2119 05/01/23  1853 05/01/23  1814 05/01/23  1034   POC GLUCOSE mg/dl 181* 109 115 110 154*     Results from last 7 days   Lab Units 05/02/23  0502   GLUCOSE RANDOM mg/dL 163*       BETA-HYDROXYBUTYRATE   Date Value Ref Range Status   03/03/2023 3 4 (H) <0 6 mmol/L Final          Results from last 7 days   Lab Units 05/01/23  1703   PH, FALGUNI I-STAT  7 365   PCO2, FALGUNI ISTAT mm HG 42 1   PO2, FALGUNI ISTAT mm HG 45 0   HCO3, FALGUNI ISTAT mmol/L 24 0   I STAT BASE EXC mmol/L -1   I STAT O2 SAT % 79       Results from last 7 days   Lab Units 05/01/23  1358   GRAM STAIN RESULT  No Polys or Bacteria seen       Diet: low carb  Mobility: UP with assist  DVT Prophylaxis: Up with assist, enoxaparin    Medications/Pain Control:   Scheduled Medications:  aspirin, 81 mg, Oral, Daily  atorvastatin, 40 mg, Oral, QAM  clopidogrel, 75 mg, Oral, QAM  docusate sodium, 100 mg, Oral, BID  enoxaparin, 40 mg, Subcutaneous, Daily  escitalopram, 10 mg, Oral, Daily  [START ON 5/3/2023] losartan, 50 mg, Oral, Daily   And  [START ON 5/3/2023] hydrochlorothiazide, 12 5 mg, Oral, Daily  insulin glargine, 25 Units, Subcutaneous, HS  insulin lispro, 15 Units, Subcutaneous, TID With Meals  insulin lispro, 2-12 Units, Subcutaneous, 4x Daily (AC & HS)  levothyroxine, 150 mcg, Oral, QAM  pantoprazole, 40 mg, Oral, Early Morning  tamsulosin, 0 4 mg, Oral, Daily With Dinner  verapamil, 360 mg, Oral, HS      Continuous IV Infusions:  lactated ringers, 100 mL/hr, Intravenous, Continuous      PRN Meds:  acetaminophen, 650 mg, Oral, Q8H PRN  HYDROmorphone, 2 mg, Oral, Q6H PRN  HYDROmorphone, 4 mg, Oral, Q6H PRN x2 5/2  HYDROmorphone, 0 2 mg, Intravenous, Q3H PRN  zolpidem, 10 mg, Oral, HS PRN      Date/Time Temp Pulse Resp BP MAP (mmHg) SpO2 O2 Flow Rate (L/min) O2 Device Cardiac (WDL) Patient Position - Orthostatic VS   05/02/23 07:07:46 97 9 °F (36 6 °C) -- 18 104/59 74 -- -- -- -- --   05/01/23 2300 97 6 °F (36 4 °C) 62 17 108/65 -- 94 % -- -- -- Lying   05/01/23 2120 -- -- -- 107/64 -- -- -- -- -- --   05/01/23 2000 97 6 °F (36 4 °C) 80 20 105/64 78 93 % -- None (Room air) -- Lying   05/01/23 18:45:14 -- -- -- 112/68 83 -- -- -- -- --   05/01/23 18:38:35 98 °F (36 7 °C) -- 18 100/67 78 -- -- -- -- --   05/01/23 1826 98 3 °F (36 8 °C) 82 16 121/69 88 94 % -- None (Room air) WDL --   05/01/23 1815 98 1 °F (36 7 °C) 82 18 120/74 92 96 % -- None (Room air) WDL --   05/01/23 1813 98 9 °F (37 2 °C)  82  16  101/69  81 97 %  5 L/min Simple mask  WDL --   05/01/23 1020 97 8 °F (36 6 °C) 85 20 114/81 -- 98 % -- None (Room air) -- --         Network Utilization Review Department  ATTENTION: Please call with any questions or concerns to [phone] and carefully listen to the prompts so that you are directed to the right person  All voicemails are confidential   Janeice Curling all requests for admission clinical reviews, approved or denied determinations and any other requests to dedicated fax number below belonging to the campus where the patient is receiving treatment   List of dedicated fax numbers for the Facilities:  [address] DENIALS (Administrative/Medical Necessity) [phone]   1000  [facility] (Maternity/NICU/Pediatrics) [phone]   9 Cassie Rascon [phone]   [facility] [phone]     [address] [phone]   [address] [place] [address] Arron Doss [facility] [address] [facility] 134 815 [facility] [phone]

## 2023-05-02 NOTE — PHYSICAL THERAPY NOTE
Physical Therapy Evaluation     Patient's Name: Cadence Nance    Admitting Diagnosis  Right medial tibial plateau fracture, closed, initial encounter [S82 131A]    Problem List  Patient Active Problem List   Diagnosis    Hypertension, essential    Gastroesophageal reflux disease without esophagitis    Hyperlipidemia    History of nonadherence to medical treatment    Right-sided tinnitus    Colon polyps    Elevated liver enzymes    Health maintenance examination    PAD (peripheral artery disease) ([facility] )    Thyroid cancer ([facility] )    Urinary retention    Hypomagnesemia    Anemia    Abnormal EKG    Preoperative examination    Insomnia    Persistent proteinuria    Acquired absence of right foot ([facility] )    Acquired hypothyroidism    Physical deconditioning    Type 2 diabetes mellitus with diabetic peripheral angiopathy without gangrene ([facility] )    Dupuytren's contracture of right hand    Stage 3b chronic kidney disease ([facility] )    Hyperkalemia    Alcoholism ([facility] )    Closed fracture of right tibial plateau    Obesity, Class I, BMI 30 0-34 9 (see actual BMI)    MARKELL (acute kidney injury) ([place] [address] )    Chronic kidney disease-mineral and bone disorder    Acquired genu varum of right lower extremity    Depression and anxiety    Closed fracture of medial plateau of right tibia    MANNY (obstructive sleep apnea)       Past Medical History  Past Medical History:   Diagnosis Date    Broken internal right knee prosthesis ([facility] ) 01/2023    Chronic kidney disease     Colon polyp     Constipation 03/09/2023    Diabetes mellitus ([facility] )     Disease of thyroid gland     GERD (gastroesophageal reflux disease)     HHS vs DKA 11/16/2018    Hyperlipidemia     Hypertension     Thyroid cancer ([facility] )        Past Surgical History  Past Surgical History:   Procedure Laterality Date    COLONOSCOPY      FOOT AMPUTATION Right     FOOT SURGERY Right 2014    IR AORTAGRAM WITH RUN-OFF  08/06/2020    IR TUNNELED CENTRAL LINE PLACEMENT  09/08/2020    IR TUNNELED CENTRAL LINE REMOVAL  09/28/2020    IL AMPUTATION FOOT TRANSMETARSAL Right 09/03/2020    Procedure: AMPUTATION TRANSMETATARSAL (TMA);  Surgeon: Alan Stephens DPM;  [facility];  Service: Podiatry    IL OPEN [place] UNICONDYLAR Right 5/1/2023    Procedure: OPEN REDUCTION W/ INTERNAL FIXATION (ORIF) RIGHT TIBIAL PLATEAU NONUNION;  Surgeon: Leatha Layne MD;  [facility];  Service: Orthopedics    IL THYROIDECTOMY TOTAL/COMPLETE N/A 02/03/2021    Procedure: TOTAL THYROIDECTOMY WITH NIMS MONITORING;  Surgeon: Kamila Haider MD;  [facility];  Service: ENT    TOE AMPUTATION Right 08/11/2020    Procedure: AMPUTATION TOE;  Surgeon: Alan Stephens DPM;  Location: [facility];  Service: [address] THYROID BIOPSY  07/02/2020 05/02/23 0834   PT Last Visit   PT Visit Date 05/02/23   Note Type   Note type Evaluation   Pain Assessment   Pain Assessment Tool 0-10   Pain Score 9   Pain Location/Orientation Orientation: Right;Location: Knee;Orientation: Lower; Location: Leg   Pain Radiating Towards from knee to foot   Pain Onset/Description Onset: Ongoing; Descriptor: [address] Pain Intervention(s) Repositioned;Elevated;Rest;Emotional support  (RN aware)   Restrictions/Precautions   Weight Bearing Precautions Per Order Yes   RLE Weight Bearing Per Order (S)  NWB   Braces or Orthoses Knee immobilizer   Other Precautions Bed Alarm;Multiple lines;WBS;Fall Risk;Pain   Home Living   Type of [address] [place] [address] One level;Performs ADLs on one level; Able to live on main level with bedroom/bathroom;Stairs to enter with rails  (4 DANIEL- friend was assisting with w/c up/down stairs)   Bathroom Shower/Tub   (sponge bathing prior to admission)   Bathroom Toilet Raised   Bathroom Equipment Grab bars around toilet   [address]  (uses transport chair to get into bathroom   uses manual w/c for locomotion)   Additional Comments pt reports he was recently in [facility], has been home for ~1 week from the rehab   Prior Function   Level of [place] Modified independent with wheelchair;Needs assistance with IADLS  (pt performs stand pivot transfers, can do independently, wife assists prn  wife assists with set up for bathing, able to dress/toilet self)   Lives With Spouse   Receives Help From Family   IADLs Family/Friend/Other provides transportation; Family/Friend/Other provides meals; Family/Friend/Other provides medication management   Falls in the last 6 months 1 to 4   Vocational On disability   General   Family/Caregiver Present No   Cognition   Overall Cognitive Status WFL   Arousal/Participation Alert   Orientation Level Oriented X4   Memory Within functional limits   Following Commands Follows all commands and directions without difficulty   RLE Assessment   RLE Assessment   (DNT formal MMT, proximal/hip strength observed 3/5 with functional mobility)   LLE Assessment   LLE Assessment   (grossly 3+/5)   Vision-Basic Assessment   Current Vision Wears glasses all the time   Coordination   Movements are Fluid and Coordinated 1   Sensation X  (baseline neuropathy)   Light Touch   RLE Light Touch Impaired   LLE Light Touch Impaired   Bed Mobility   Rolling R 4  Minimal assistance   Additional items Assist x 2;HOB elevated; Bedrails; Increased time required;Verbal cues;LE management   Rolling L 4  Minimal assistance   Additional items Assist x 2;HOB elevated; Bedrails; Increased time required;Verbal cues;LE management   Additional Comments pt refusing further mobility at this time d/t pain   Endurance Deficit   Endurance Deficit Yes   Activity Tolerance   Activity Tolerance Patient limited by pain; Patient limited by fatigue   Medical Staff Made Aware Co-evaluation performed with ILA Castillo due to medical complexity and clinical presentation   Nurse Made Aware RN [facility]   Assessment   Prognosis Fair   Problem List Decreased strength;Decreased range of motion;Decreased endurance; Impaired balance;Decreased mobility;Orthopedic restrictions;Pain; Impaired sensation   Assessment Pt is 59 y o  male seen for PT evaluation on 5/2/2023 s/p admit to [facility] on 5/1/2023 w/ Closed fracture of medial plateau of right tibia  POD#1 ORIF right tibial plateau nonunion  PT was consulted to assess pt's functional mobility and d/c needs  Order placed for PT eval and tx  PTA, pt resides with wife in [facility] with 4 DANIEL, uses w/c for locomotion, performs stand pivot transfers  At time of eval, pt requiring mod A x2 for B/L rolling, pt declining further mobility 2* pain; RN aware  Upon evaluation, pt presenting with impaired functional mobility d/t decreased strength, decreased ROM, decreased endurance, impaired balance, decreased mobility, impaired sensation, orthopedic restrictions of NWB R LE, knee immobilizer, pain and activity intolerance  Pertinent PMHx and current co-morbidities affecting pt's physical performance at time of assessment include: HTN, HLD, PAD, type 2 DM, stage 3b CKD, MANNY, thyroid cancer, anemia, insomnia, physical deconditioning, R tibial plateau fx, obesity, depression and anxiety  Personal factors affecting pt at time of eval include: stairs to enter home, inability to ambulate household distances, inability to navigate level surfaces w/o external assistance and positive fall history  The following objective measures performed on IE also reveal limitations: Barthel Index: 30/100, Modified Nirali: 5 (severe disability) and AM-PAC 6-Clicks: 5/97  Pt's clinical presentation is currently unstable/unpredictable seen in pt's presentation of R LE pain impacting overall mobility status and ongoing medical assessment   Overall, pt's rehab potential and prognosis to return to PLOF is fair as impacted by objective findings, warranting pt to receive further skilled PT interventions to address identified impairments, activity limitation(s), and participation restriction(s)  Pt to benefit from continued PT tx to address deficits as defined above and maximize level of functional independent mobility  From PT/mobility standpoint, recommendation at time of d/c would be post acute rehabilitation services pending progress in order to facilitate return to PLOF  Barriers to Discharge Inaccessible home environment;Decreased caregiver support   Goals   Patient Goals none expressed   STG Expiration Date 05/12/23   Short Term Goal #1 In 7-10 days: Increase L LE strength 1/2 grade to facilitate independent mobility, Perform all bed mobility tasks with min A of 1 to decrease caregiver burden, Improve Barthel Index score to 45 or greater to facilitate independence and PT to see and establish goals for transfers, sitting/standing balance, ambulation, w/c mobility when appropriate   PT Treatment Day 0   Plan   Treatment/Interventions LE strengthening/ROM; Therapeutic exercise; Endurance training;Patient/family training;Equipment eval/education; Bed mobility;Continued evaluation;Spoke to nursing;OT   PT Frequency 4-6x/wk   Recommendation   PT Discharge Recommendation Post acute rehabilitation services   Equipment Recommended   (TBD)   AM-PAC Basic Mobility Inpatient   Turning in Flat Bed Without Bedrails 2   Lying on Back to Sitting on Edge of Flat Bed Without Bedrails 1   Moving Bed to Chair 1   Standing Up From Chair Using Arms 1   Walk in Room 1   Climb 3-5 Stairs With Railing 1   Basic Mobility Inpatient Raw Score 7   Turning Head Towards Sound 4   Follow Simple Instructions 4   Low Function Basic Mobility Raw Score  15   Low Function Basic Mobility Standardized Score  23 9   Highest Level Of Mobility   JH-HLM Goal 2: Bed activities/Dependent transfer   JH-HLM Achieved 2: Bed activities/Dependent transfer   Modified Nirali Scale   Modified Nirali Scale 5   Barthel Index   Feeding 10   Bathing 0   Grooming Score 0   Dressing Score 0   Bladder Score 10   Bowels Score 10   Toilet Use Score 0   Transfers (Bed/Chair) Score 0   Mobility (Level Surface) Score 0   Stairs Score 0   Barthel Index Score 30           Adebayo Real, PT, DPT

## 2023-05-02 NOTE — ASSESSMENT & PLAN NOTE
Lab Results   Component Value Date    EGFR 77 05/02/2023    EGFR 69 04/24/2023    EGFR 90 03/07/2023    CREATININE 1 04 05/02/2023    CREATININE 1 14 04/24/2023    CREATININE 0 92 03/07/2023     · Appears with baseline, stable  · Monitor BMP

## 2023-05-02 NOTE — PLAN OF CARE
Problem: PAIN - ADULT  Goal: Verbalizes/displays adequate comfort level or baseline comfort level  Description: Interventions:  - Encourage patient to monitor pain and request assistance  - Assess pain using appropriate pain scale  - Administer analgesics based on type and severity of pain and evaluate response  - Implement non-pharmacological measures as appropriate and evaluate response  - Consider cultural and social influences on pain and pain management  - Notify physician/advanced practitioner if interventions unsuccessful or patient reports new pain  Outcome: Progressing     Problem: INFECTION - ADULT  Goal: Absence or prevention of progression during hospitalization  Description: INTERVENTIONS:  - Assess and monitor for signs and symptoms of infection  - Monitor lab/diagnostic results  - Monitor all insertion sites, i e  indwelling lines, tubes, and drains  - Monitor endotracheal if appropriate and nasal secretions for changes in amount and color  - [place] appropriate cooling/warming therapies per order  - Administer medications as ordered  - Instruct and encourage patient and family to use good hand hygiene technique  - Identify and instruct in appropriate isolation precautions for identified infection/condition  Outcome: Progressing  Goal: Absence of fever/infection during neutropenic period  Description: INTERVENTIONS:  - Monitor WBC    Outcome: Progressing     Problem: SAFETY ADULT  Goal: Patient will remain free of falls  Description: INTERVENTIONS:  - Educate patient/family on patient safety including physical limitations  - Instruct patient to call for assistance with activity   - Consult OT/PT to assist with strengthening/mobility   - Keep Call bell within reach  - Keep bed low and locked with side rails adjusted as appropriate  - Keep care items and personal belongings within reach  - Initiate and maintain comfort rounds  - Make Fall Risk Sign visible to staff  - Offer Toileting every 2 Hours, in advance of need  - Initiate/Maintain bed alarm  - Obtain necessary fall risk management equipment:    - Apply yellow socks and bracelet for high fall risk patients  - Consider moving patient to room near nurses station  Outcome: Progressing  Goal: Maintain or return to baseline ADL function  Description: INTERVENTIONS:  -  Assess patient's ability to carry out ADLs; assess patient's baseline for ADL function and identify physical deficits which impact ability to perform ADLs (bathing, care of mouth/teeth, toileting, grooming, dressing, etc )  - Assess/evaluate cause of self-care deficits   - Assess range of motion  - Assess patient's mobility; develop plan if impaired  - Assess patient's need for assistive devices and provide as appropriate  - Encourage maximum independence but intervene and supervise when necessary  - Involve family in performance of ADLs  - Assess for home care needs following discharge   - Consider OT consult to assist with ADL evaluation and planning for discharge  - Provide patient education as appropriate  Outcome: Progressing  Goal: Maintains/Returns to pre admission functional level  Description: INTERVENTIONS:  - Perform BMAT or MOVE assessment daily    - Set and communicate daily mobility goal to care team and patient/family/caregiver  - Collaborate with rehabilitation services on mobility goals if consulted  - Perform Range of Motion 3 times a day  - Reposition patient every 2 hours    - Dangle patient 3 times a day  - Stand patient 3 times a day  - Ambulate patient 3 times a day  - Out of bed to chair 3 times a day   - Out of bed for meals 3 times a day  - Out of bed for toileting  - Record patient progress and toleration of activity level   Outcome: Progressing     Problem: DISCHARGE PLANNING  Goal: Discharge to home or other facility with appropriate resources  Description: INTERVENTIONS:  - Identify barriers to discharge w/patient and caregiver  - Arrange for needed discharge resources and transportation as appropriate  - Identify discharge learning needs (meds, wound care, etc )  - Arrange for interpretive services to assist at discharge as needed  - Refer to Case Management Department for coordinating discharge planning if the patient needs post-hospital services based on physician/advanced practitioner order or complex needs related to functional status, cognitive ability, or social support system  Outcome: Progressing     Problem: Knowledge Deficit  Goal: Patient/family/caregiver demonstrates understanding of disease process, treatment plan, medications, and discharge instructions  Description: Complete learning assessment and assess knowledge base    Interventions:  - Provide teaching at level of understanding  - Provide teaching via preferred learning methods  Outcome: Progressing     Problem: SKIN/TISSUE INTEGRITY - ADULT  Goal: Skin Integrity remains intact(Skin Breakdown Prevention)  Description: Assess:  -Perform Fabrice assessment every 4 hours  -Clean and moisturize skin every dressing change  -Inspect skin when repositioning, toileting, and assisting with ADLS  -Assess under medical devices such as   every    -Assess extremities for adequate circulation and sensation     Bed Management:  -Have minimal linens on bed & keep smooth, unwrinkled  -Change linens as needed when moist or perspiring  -Avoid sitting or lying in one position for more than 2 hours while in bed  -Keep HOB at 30 degrees     Toileting:  -Offer bedside commode  -Assess for incontinence every 2 hours  -Use incontinent care products after each incontinent episode such as     Activity:  -Mobilize patient 3 times a day  -Encourage activity and walks on unit  -Encourage or provide ROM exercises   -Turn and reposition patient every 2 Hours  -Use appropriate equipment to lift or move patient in bed  -Instruct/ Assist with weight shifting every 2 hours when out of bed in chair  -Consider limitation of chair time 4 hour intervals    Skin Care:  -Avoid use of baby powder, tape, friction and shearing, hot water or constrictive clothing  -Relieve pressure over bony prominences using    -Do not massage red bony areas    Next Steps:  -Teach patient strategies to minimize risks such as     -Consider consults to  interdisciplinary teams such as    Outcome: Progressing  Goal: Incision(s), wounds(s) or drain site(s) healing without S/S of infection  Description: INTERVENTIONS  - Assess and document dressing, incision, wound bed, drain sites and surrounding tissue  - Provide patient and family education  - Perform skin care/dressing changes every    Outcome: Progressing  Goal: Pressure injury heals and does not worsen  Description: Interventions:  - Implement low air loss mattress or specialty surface (Criteria met)  - Apply silicone foam dressing  - Instruct/assist with weight shifting every 30 minutes when in chair   - Limit chair time to 4 hour intervals  - Use special pressure reducing interventions such as   when in chair   - Apply fecal or urinary incontinence containment device   - Perform passive or active ROM every 2 hours  - Turn and reposition patient & offload bony prominences every  2  hours   - Utilize friction reducing device or surface for transfers   - Consider consults to  interdisciplinary teams such as    - Use incontinent care products after each incontinent episode such as    - Consider nutrition services referral as needed  Outcome: Progressing

## 2023-05-02 NOTE — PLAN OF CARE
Problem: PHYSICAL THERAPY ADULT  Goal: Performs mobility at highest level of function for planned discharge setting  See evaluation for individualized goals  Description: Treatment/Interventions: LE strengthening/ROM, Therapeutic exercise, Endurance training, Patient/family training, Equipment eval/education, Bed mobility, Continued evaluation, Spoke to nursing, OT  Equipment Recommended:  (TBD)       See flowsheet documentation for full assessment, interventions and recommendations  5/2/2023 1208 by Dionne Koehler PT  Note: Prognosis: Fair  Problem List: Decreased strength, Decreased range of motion, Decreased endurance, Impaired balance, Decreased mobility, Orthopedic restrictions, Pain, Impaired sensation  Assessment: Pt is 59 y o  male seen for PT evaluation on 5/2/2023 s/p admit to [facility] on 5/1/2023 w/ Closed fracture of medial plateau of right tibia  POD#1 ORIF right tibial plateau nonunion  PT was consulted to assess pt's functional mobility and d/c needs  Order placed for PT eval and tx  PTA, pt resides with wife in [facility] with 4 DANIEL, uses w/c for locomotion, performs stand pivot transfers  At time of eval, pt requiring mod A x2 for B/L rolling, pt declining further mobility 2* pain; RN aware  Upon evaluation, pt presenting with impaired functional mobility d/t decreased strength, decreased ROM, decreased endurance, impaired balance, decreased mobility, impaired sensation, orthopedic restrictions of NWB R LE, knee immobilizer, pain and activity intolerance  Pertinent PMHx and current co-morbidities affecting pt's physical performance at time of assessment include: HTN, HLD, PAD, type 2 DM, stage 3b CKD, MANNY, thyroid cancer, anemia, insomnia, physical deconditioning, R tibial plateau fx, obesity, depression and anxiety   Personal factors affecting pt at time of eval include: stairs to enter home, inability to ambulate household distances, inability to navigate level surfaces w/o external assistance and positive fall history  The following objective measures performed on IE also reveal limitations: Barthel Index: 30/100, Modified [place]: 5 (severe disability) and AM-PAC 6-Clicks: 6/41  Pt's clinical presentation is currently unstable/unpredictable seen in pt's presentation of R LE pain impacting overall mobility status and ongoing medical assessment  Overall, pt's rehab potential and prognosis to return to PLOF is fair as impacted by objective findings, warranting pt to receive further skilled PT interventions to address identified impairments, activity limitation(s), and participation restriction(s)  Pt to benefit from continued PT tx to address deficits as defined above and maximize level of functional independent mobility  From PT/mobility standpoint, recommendation at time of d/c would be post acute rehabilitation services pending progress in order to facilitate return to PLOF  Barriers to Discharge: Inaccessible home environment, Decreased caregiver support     PT Discharge Recommendation: Post acute rehabilitation services    See flowsheet documentation for full assessment  5/2/2023 1208 by Adebayo Real PT  Note: Prognosis: Fair  Problem List: Decreased strength, Decreased range of motion, Decreased endurance, Impaired balance, Decreased mobility, Orthopedic restrictions, Pain, Impaired sensation  Assessment: Pt is 59 y o  male seen for PT evaluation on 5/2/2023 s/p admit to [facility] on 5/1/2023 w/ Closed fracture of medial plateau of right tibia  POD#1 ORIF right tibial plateau nonunion  PT was consulted to assess pt's functional mobility and d/c needs  Order placed for PT eval and tx  PTA, pt resides with wife in [facility] with 4 DANIEL, uses w/c for locomotion, performs stand pivot transfers  At time of eval, pt requiring mod A x2 for B/L rolling, pt declining further mobility 2* pain; RN aware   Upon evaluation, pt presenting with impaired functional mobility d/t decreased strength, decreased ROM, decreased endurance, impaired balance, decreased mobility, impaired sensation, orthopedic restrictions of NWB R LE, knee immobilizer, pain and activity intolerance  Pertinent PMHx and current co-morbidities affecting pt's physical performance at time of assessment include: HTN, HLD, PAD, type 2 DM, stage 3b CKD, MANNY, thyroid cancer, anemia, insomnia, physical deconditioning, R tibial plateau fx, obesity, depression and anxiety  Personal factors affecting pt at time of eval include: stairs to enter home, inability to ambulate household distances, inability to navigate level surfaces w/o external assistance and positive fall history  The following objective measures performed on IE also reveal limitations: Barthel Index: 30/100, Modified [place]: 5 (severe disability) and AM-PAC 6-Clicks: 0/22  Pt's clinical presentation is currently unstable/unpredictable seen in pt's presentation of R LE pain impacting overall mobility status and ongoing medical assessment  Overall, pt's rehab potential and prognosis to return to PLOF is fair as impacted by objective findings, warranting pt to receive further skilled PT interventions to address identified impairments, activity limitation(s), and participation restriction(s)  Pt to benefit from continued PT tx to address deficits as defined above and maximize level of functional independent mobility  From PT/mobility standpoint, recommendation at time of d/c would be post acute rehabilitation services pending progress in order to facilitate return to PLOF  Barriers to Discharge: Inaccessible home environment, Decreased caregiver support     PT Discharge Recommendation: Post acute rehabilitation services    See flowsheet documentation for full assessment

## 2023-05-03 ENCOUNTER — TRANSCRIBE ORDERS (OUTPATIENT)
Dept: VASCULAR SURGERY | Facility: CLINIC | Age: 58
End: 2023-05-03

## 2023-05-03 DIAGNOSIS — I73.9 PAD (PERIPHERAL ARTERY DISEASE) (HCC): Primary | ICD-10-CM

## 2023-05-03 LAB
ANION GAP SERPL CALCULATED.3IONS-SCNC: 6 MMOL/L (ref 4–13)
BASOPHILS # BLD AUTO: 0.07 THOUSANDS/ÂΜL (ref 0–0.1)
BASOPHILS NFR BLD AUTO: 0 % (ref 0–1)
BUN SERPL-MCNC: 22 MG/DL (ref 5–25)
CALCIUM SERPL-MCNC: 8.3 MG/DL (ref 8.4–10.2)
CHLORIDE SERPL-SCNC: 99 MMOL/L (ref 96–108)
CO2 SERPL-SCNC: 24 MMOL/L (ref 21–32)
CREAT SERPL-MCNC: 0.9 MG/DL (ref 0.6–1.3)
EOSINOPHIL # BLD AUTO: 0.02 THOUSAND/ÂΜL (ref 0–0.61)
EOSINOPHIL NFR BLD AUTO: 0 % (ref 0–6)
ERYTHROCYTE [DISTWIDTH] IN BLOOD BY AUTOMATED COUNT: 13.2 % (ref 11.6–15.1)
FERRITIN SERPL-MCNC: 749 NG/ML (ref 8–388)
GFR SERPL CREATININE-BSD FRML MDRD: 92 ML/MIN/1.73SQ M
GLUCOSE SERPL-MCNC: 141 MG/DL (ref 65–140)
GLUCOSE SERPL-MCNC: 142 MG/DL (ref 65–140)
GLUCOSE SERPL-MCNC: 181 MG/DL (ref 65–140)
GLUCOSE SERPL-MCNC: 185 MG/DL (ref 65–140)
GLUCOSE SERPL-MCNC: 190 MG/DL (ref 65–140)
HCT VFR BLD AUTO: 24.4 % (ref 36.5–49.3)
HCT VFR BLD AUTO: 25 % (ref 36.5–49.3)
HGB BLD-MCNC: 8 G/DL (ref 12–17)
HGB BLD-MCNC: 8.1 G/DL (ref 12–17)
IMM GRANULOCYTES # BLD AUTO: 0.13 THOUSAND/UL (ref 0–0.2)
IMM GRANULOCYTES NFR BLD AUTO: 1 % (ref 0–2)
IRON SATN MFR SERPL: 7 % (ref 20–50)
IRON SERPL-MCNC: 14 UG/DL (ref 65–175)
LYMPHOCYTES # BLD AUTO: 1.55 THOUSANDS/ÂΜL (ref 0.6–4.47)
LYMPHOCYTES NFR BLD AUTO: 8 % (ref 14–44)
MCH RBC QN AUTO: 30.2 PG (ref 26.8–34.3)
MCHC RBC AUTO-ENTMCNC: 32.4 G/DL (ref 31.4–37.4)
MCV RBC AUTO: 93 FL (ref 82–98)
MONOCYTES # BLD AUTO: 2.54 THOUSAND/ÂΜL (ref 0.17–1.22)
MONOCYTES NFR BLD AUTO: 13 % (ref 4–12)
NEUTROPHILS # BLD AUTO: 15.11 THOUSANDS/ÂΜL (ref 1.85–7.62)
NEUTS SEG NFR BLD AUTO: 78 % (ref 43–75)
NRBC BLD AUTO-RTO: 0 /100 WBCS
OSMOLALITY UR/SERPL-RTO: 284 MMOL/KG (ref 282–298)
OSMOLALITY UR: 646 MMOL/KG
PLATELET # BLD AUTO: 260 THOUSANDS/UL (ref 149–390)
PMV BLD AUTO: 10.7 FL (ref 8.9–12.7)
POTASSIUM SERPL-SCNC: 3.9 MMOL/L (ref 3.5–5.3)
RBC # BLD AUTO: 2.68 MILLION/UL (ref 3.88–5.62)
SODIUM 24H UR-SCNC: 50 MOL/L
SODIUM SERPL-SCNC: 129 MMOL/L (ref 135–147)
TIBC SERPL-MCNC: 206 UG/DL (ref 250–450)
URATE SERPL-MCNC: 5.2 MG/DL (ref 3.5–8.5)
WBC # BLD AUTO: 19.42 THOUSAND/UL (ref 4.31–10.16)

## 2023-05-03 RX ADMIN — DOCUSATE SODIUM 100 MG: 100 CAPSULE, LIQUID FILLED ORAL at 09:14

## 2023-05-03 RX ADMIN — TAMSULOSIN HYDROCHLORIDE 0.4 MG: 0.4 CAPSULE ORAL at 17:39

## 2023-05-03 RX ADMIN — CLOPIDOGREL BISULFATE 75 MG: 75 TABLET ORAL at 09:14

## 2023-05-03 RX ADMIN — INSULIN LISPRO 2 UNITS: 100 INJECTION, SOLUTION INTRAVENOUS; SUBCUTANEOUS at 09:15

## 2023-05-03 RX ADMIN — ENOXAPARIN SODIUM 40 MG: 40 INJECTION SUBCUTANEOUS at 07:26

## 2023-05-03 RX ADMIN — HYDROMORPHONE HYDROCHLORIDE 4 MG: 4 TABLET ORAL at 12:57

## 2023-05-03 RX ADMIN — INSULIN LISPRO 2 UNITS: 100 INJECTION, SOLUTION INTRAVENOUS; SUBCUTANEOUS at 11:45

## 2023-05-03 RX ADMIN — HYDROMORPHONE HYDROCHLORIDE 4 MG: 4 TABLET ORAL at 20:55

## 2023-05-03 RX ADMIN — SODIUM CHLORIDE, SODIUM LACTATE, POTASSIUM CHLORIDE, AND CALCIUM CHLORIDE 100 ML/HR: .6; .31; .03; .02 INJECTION, SOLUTION INTRAVENOUS at 07:27

## 2023-05-03 RX ADMIN — DOCUSATE SODIUM 100 MG: 100 CAPSULE, LIQUID FILLED ORAL at 17:39

## 2023-05-03 RX ADMIN — LEVOTHYROXINE SODIUM 150 MCG: 150 TABLET ORAL at 09:14

## 2023-05-03 RX ADMIN — ATORVASTATIN CALCIUM 40 MG: 40 TABLET, FILM COATED ORAL at 09:14

## 2023-05-03 RX ADMIN — INSULIN LISPRO 15 UNITS: 100 INJECTION, SOLUTION INTRAVENOUS; SUBCUTANEOUS at 11:45

## 2023-05-03 RX ADMIN — LOSARTAN POTASSIUM 50 MG: 50 TABLET, FILM COATED ORAL at 09:14

## 2023-05-03 RX ADMIN — ESCITALOPRAM OXALATE 10 MG: 10 TABLET, FILM COATED ORAL at 09:14

## 2023-05-03 RX ADMIN — PANTOPRAZOLE SODIUM 40 MG: 40 TABLET, DELAYED RELEASE ORAL at 07:26

## 2023-05-03 RX ADMIN — INSULIN GLARGINE 25 UNITS: 100 INJECTION, SOLUTION SUBCUTANEOUS at 22:11

## 2023-05-03 RX ADMIN — ASPIRIN 81 MG: 81 TABLET, CHEWABLE ORAL at 09:14

## 2023-05-03 NOTE — PLAN OF CARE
Problem: PAIN - ADULT  Goal: Verbalizes/displays adequate comfort level or baseline comfort level  Description: Interventions:  - Encourage patient to monitor pain and request assistance  - Assess pain using appropriate pain scale  - Administer analgesics based on type and severity of pain and evaluate response  - Implement non-pharmacological measures as appropriate and evaluate response  - Consider cultural and social influences on pain and pain management  - Notify physician/advanced practitioner if interventions unsuccessful or patient reports new pain  Outcome: Progressing     Problem: INFECTION - ADULT  Goal: Absence or prevention of progression during hospitalization  Description: INTERVENTIONS:  - Assess and monitor for signs and symptoms of infection  - Monitor lab/diagnostic results  - Monitor all insertion sites, i e  indwelling lines, tubes, and drains  - Monitor endotracheal if appropriate and nasal secretions for changes in amount and color  - [place] appropriate cooling/warming therapies per order  - Administer medications as ordered  - Instruct and encourage patient and family to use good hand hygiene technique  - Identify and instruct in appropriate isolation precautions for identified infection/condition  Outcome: Progressing  Goal: Absence of fever/infection during neutropenic period  Description: INTERVENTIONS:  - Monitor WBC    Outcome: Progressing     Problem: SAFETY ADULT  Goal: Patient will remain free of falls  Description: INTERVENTIONS:  - Educate patient/family on patient safety including physical limitations  - Instruct patient to call for assistance with activity   - Consult OT/PT to assist with strengthening/mobility   - Keep Call bell within reach  - Keep bed low and locked with side rails adjusted as appropriate  - Keep care items and personal belongings within reach  - Initiate and maintain comfort rounds  - Make Fall Risk Sign visible to staff  - Offer Toileting every 2 Hours, in advance of need  - Initiate/Maintain bed alarm  - Obtain necessary fall risk management equipment:   - Apply yellow socks and bracelet for high fall risk patients  - Consider moving patient to room near nurses station  Outcome: Progressing  Goal: Maintain or return to baseline ADL function  Description: INTERVENTIONS:  -  Assess patient's ability to carry out ADLs; assess patient's baseline for ADL function and identify physical deficits which impact ability to perform ADLs (bathing, care of mouth/teeth, toileting, grooming, dressing, etc )  - Assess/evaluate cause of self-care deficits   - Assess range of motion  - Assess patient's mobility; develop plan if impaired  - Assess patient's need for assistive devices and provide as appropriate  - Encourage maximum independence but intervene and supervise when necessary  - Involve family in performance of ADLs  - Assess for home care needs following discharge   - Consider OT consult to assist with ADL evaluation and planning for discharge  - Provide patient education as appropriate  Outcome: Progressing  Goal: Maintains/Returns to pre admission functional level  Description: INTERVENTIONS:  - Perform BMAT or MOVE assessment daily    - Set and communicate daily mobility goal to care team and patient/family/caregiver  - Collaborate with rehabilitation services on mobility goals if consulted  - Perform Range of Motion 3 times a day  - Reposition patient every 2 hours    - Dangle patient 3 times a day  - Stand patient 3 times a day  - Ambulate patient 3 times a day  - Out of bed to chair 3 times a day   - Out of bed for meals 3 times a day  - Out of bed for toileting  - Record patient progress and toleration of activity level   Outcome: Progressing     Problem: DISCHARGE PLANNING  Goal: Discharge to home or other facility with appropriate resources  Description: INTERVENTIONS:  - Identify barriers to discharge w/patient and caregiver  - Arrange for needed discharge resources and transportation as appropriate  - Identify discharge learning needs (meds, wound care, etc )  - Arrange for interpretive services to assist at discharge as needed  - Refer to Case Management Department for coordinating discharge planning if the patient needs post-hospital services based on physician/advanced practitioner order or complex needs related to functional status, cognitive ability, or social support system  Outcome: Progressing     Problem: Knowledge Deficit  Goal: Patient/family/caregiver demonstrates understanding of disease process, treatment plan, medications, and discharge instructions  Description: Complete learning assessment and assess knowledge base    Interventions:  - Provide teaching at level of understanding  - Provide teaching via preferred learning methods  Outcome: Progressing     Problem: SKIN/TISSUE INTEGRITY - ADULT  Goal: Skin Integrity remains intact(Skin Breakdown Prevention)  Description: Assess:  -Perform Fabrice assessment every   -Clean and moisturize skin every   -Inspect skin when repositioning, toileting, and assisting with ADLS  -Assess under medical devices such as  every   -Assess extremities for adequate circulation and sensation     Bed Management:  -Have minimal linens on bed & keep smooth, unwrinkled  -Change linens as needed when moist or perspiring  -Avoid sitting or lying in one position for more than  hours while in bed  -Keep HOB at degrees     Toileting:  -Offer bedside commode  -Assess for incontinence every   -Use incontinent care products after each incontinent episode such as     Activity:  -Mobilize patient times a day  -Encourage activity and walks on unit  -Encourage or provide ROM exercises   -Turn and reposition patient every 2 Hours  -Use appropriate equipment to lift or move patient in bed  -Instruct/ Assist with weight shifting every  when out of bed in chair  -Consider limitation of chair time 2 hour intervals    Skin Care:  -Avoid use of baby powder, tape, friction and shearing, hot water or constrictive clothing  -Relieve pressure over bony prominences using   -Do not massage red bony areas    Next Steps:  -Teach patient strategies to minimize risks such as    -Consider consults to  interdisciplinary teams such as   Outcome: Progressing  Goal: Incision(s), wounds(s) or drain site(s) healing without S/S of infection  Description: INTERVENTIONS  - Assess and document dressing, incision, wound bed, drain sites and surrounding tissue  - Provide patient and family education  - Perform skin care/dressing changes every   Outcome: Progressing  Goal: Pressure injury heals and does not worsen  Description: Interventions:  - Implement low air loss mattress or specialty surface (Criteria met)  - Apply silicone foam dressing  - Instruct/assist with weight shifting every 120 minutes when in chair   - Limit chair time to 2 hour intervals  - Use special pressure reducing interventions when in chair   - Apply fecal or urinary incontinence containment device   - Perform passive or active ROM   - Turn and reposition patient & offload bony prominences every 2 hours   - Utilize friction reducing device or surface for transfers   - Consider consults to  interdisciplinary teams   - Use incontinent care products after each incontinent episode   - Consider nutrition services referral as needed  Outcome: Progressing     Problem: Prexisting or High Potential for Compromised Skin Integrity  Goal: Skin integrity is maintained or improved  Description: INTERVENTIONS:  - Identify patients at risk for skin breakdown  - Assess and monitor skin integrity  - Assess and monitor nutrition and hydration status  - Monitor labs   - Assess for incontinence   - Turn and reposition patient  - Assist with mobility/ambulation  - Relieve pressure over bony prominences  - Avoid friction and shearing  - Provide appropriate hygiene as needed including keeping skin clean and dry  - Evaluate need for skin moisturizer/barrier cream  - Collaborate with interdisciplinary team   - Patient/family teaching  - Consider wound care consult   Outcome: Progressing     Problem: MOBILITY - ADULT  Goal: Maintain or return to baseline ADL function  Description: INTERVENTIONS:  -  Assess patient's ability to carry out ADLs; assess patient's baseline for ADL function and identify physical deficits which impact ability to perform ADLs (bathing, care of mouth/teeth, toileting, grooming, dressing, etc )  - Assess/evaluate cause of self-care deficits   - Assess range of motion  - Assess patient's mobility; develop plan if impaired  - Assess patient's need for assistive devices and provide as appropriate  - Encourage maximum independence but intervene and supervise when necessary  - Involve family in performance of ADLs  - Assess for home care needs following discharge   - Consider OT consult to assist with ADL evaluation and planning for discharge  - Provide patient education as appropriate  Outcome: Progressing  Goal: Maintains/Returns to pre admission functional level  Description: INTERVENTIONS:  - Perform BMAT or MOVE assessment daily    - Set and communicate daily mobility goal to care team and patient/family/caregiver  - Collaborate with rehabilitation services on mobility goals if consulted  - Perform Range of Motion 3 times a day  - Reposition patient every 2 hours    - Dangle patient 3 times a day  - Stand patient 3 times a day  - Ambulate patient 3 times a day  - Out of bed to chair 3 times a day   - Out of bed for meals 3 times a day  - Out of bed for toileting  - Record patient progress and toleration of activity level   Outcome: Progressing

## 2023-05-03 NOTE — OCCUPATIONAL THERAPY NOTE
Occupational Therapy Cancellation Note     Patient Name: Audry Prader  Today's Date: 5/3/2023  Problem List  Principal Problem:    Closed fracture of medial plateau of right tibia  Active Problems:    Hypertension, essential    Hyperlipidemia    Hyponatremia    PAD (peripheral artery disease) ([facility])    Anemia    Type 2 diabetes mellitus with diabetic peripheral angiopathy without gangrene ([facility])    Stage 3b chronic kidney disease ([facility])    MANNY (obstructive sleep apnea)          05/03/23 1057   OT Last Visit   OT Visit Date 05/03/23   Note Type   Note Type Cancelled Session   Cancel Reasons Refusal       OT order active and chart review performed  At this time, OT treatment cancelled due to patient refusing secondary to significant pain (9/10)  Educated patient on the benefits of participating in therapy sessions  Pt verbalized understanding but continued to decline  Pt states he will try tomorrow  OT will follow and provide skilled interventions as appropriate        MELISSA Cao, OTR/L

## 2023-05-03 NOTE — ASSESSMENT & PLAN NOTE
Lab Results   Component Value Date    EGFR 92 05/03/2023    EGFR 77 05/02/2023    EGFR 69 04/24/2023    CREATININE 0 90 05/03/2023    CREATININE 1 04 05/02/2023    CREATININE 1 14 04/24/2023     · Appears with baseline, stable  · Monitor BMP

## 2023-05-03 NOTE — OP NOTE
OPERATIVE REPORT  PATIENT NAME: Nakia Narvaez    :  1965  MRN: [id number]  Pt Location: [facility] OR ROOM 03    SURGERY DATE: 2023    Surgeon(s) and Role:     * Pratima Nash MD - Primary     * Laron Felix PA-C - Assisting     * Sybil Albarado PA-C - Assisting    Preop Diagnosis:  Right tibial plateau bicondylar fracture/nonunion    Postop Diagnosis:  Same    Procedure(s):  ORIF R tibial plateau bicondylar fracture (CPT 42697)  Use of allograft, other than spine    Specimen(s):  ID Type Source Tests Collected by Time Destination   A : Right knee Tissue Joint, Right Knee ANAEROBIC CULTURE AND GRAM STAIN, CULTURE, TISSUE AND GRAM STAIN Pratima Nash MD 2023 1354        Estimated Blood Loss:   500 mL    Drains:  [REMOVED] Urethral Catheter Non-latex; Double-lumen;Straight-tip 16 Fr  (Removed)   Number of days: 0       Anesthesia Type:   General    Operative Indications:  R bicondylar tibial plateau fracture with collapse, varus instability, tibiofemoral subluxation    Operative Findings:  See below    Complications:   None    Implants: Synthes 4 hl 3 5 mm LCP proximal medial tibial plate, 3 5 mm VA-LCP proximal lateral tibial plate, femoral allograft strut graft, iliac crest wedge allograft, Norian calcium phosphate, Vivigen, cancellous bone chips    Procedure and Technique:  The patient operative site, laterality, procedure, consent were verified in the preoperative area and the patient was taken to the operative room  General anesthesia was induced and a nonsterile tourniquet was applied to the operative extremity and deflated at appropriate intervals  A Mae catheter was placed and removed after the case  The operative extremity was prepped and draped in the usual sterile fashion  After timeout, medial approach to the proximal tibia took place and dissection was taken down to the level of the fascia which was incised taking care to protect saphenous neurovascular structures  Deep fascia was also incised and the fracture site was easily identified and proximally  Muscle was bluntly elevated off bone  In order to restore alignment and length of the extremity, femoral distractor would be required  A stab incision was made medially at the distal femoral shaft and tissue was spread and a Schanz pin was drilled into the femur and additional stab incision well distal in the tibial shaft from planned fixation was made and a Schanz pin was drilled into the tibia  Distractor was applied and coronal plane alignment as well as length of the fracture was restored  The available proximal tibial fragments which involved the medial column lateral column as well as the posterior column of the proximal tibia were aligned into best position possible and held in place with multiple K wires  A proximal lateral approach was also made given the need for multiple rafting screw fixation and dissection was taken down to the level of the fascia which was incised  Anterior compartment musculature was elevated bluntly and wires were utilized from lateral to medial as well to hold the available pieces of the articular surface  Periarticular clamp was also utilized to reduce condylar widening  The knee joint was noted to be well reduced at this point and a bony void was identified medially  Drilling took place at the proximal tibial metaphysis to restore a bleeding bone bed  2 allograft wedges were then contoured utilizing microsagittal saw and an iliac crest wedge allograft as well as femoral cortical strut allograft was utilized  We then applied Norian calcium phosphate allograft and packed the remaining void with cancellous bone chips and Vivigen allograft  Fixation then took place medially and laterally with a lateral plate and medial plate with mixture of cortical and locking screw fixation again utilizing the periarticular clamp to reduce condylar widening    Reduction was found to be maintained after removal of K wires as well as removal of femoral distractor  The knee was then taken through range of motion testing under live fluoroscopy to ensure stability of the tibiofemoral joint  There was noted to be no subluxation or dislocation of the knee joint with full extension and full flexion  Wounds were then copiously irrigated with sterile saline  Vancomycin powder was applied to medial and lateral tibial wounds  Closure then took place at fascial layers with heavy Vicryl suture, subcutaneous layer closure to place all 4 incisions with Vicryl suture, skin layer closure to place with nylon sutures  Sterile dressings were applied  The leg was wrapped from foot to thigh with Webril and Ace wrap  Patient was placed in a knee immobilizer  All final counts were correct  I was present for the entire procedure  The patient was extubated and awakened and taken to the PACU in stable condition  There were no immediate complications  There is was no qualified resident available for the procedure  Critical assistance from Chin Pat PA-C was required for all components of the procedure including but not limited to positioning, soft tissue retraction, maintenance of reduction and restoration of alignment of the tibia  This was particularly important given the complex nature of the fracture as well as BMI of 32 74  The patient will be nonweightbearing on the operative extremity for an anticipated 10 to 12 weeks  He is an uncontrolled diabetic and will require prolonged nonweightbearing status  We will maintain the immobilizer for 2 weeks  He will require Lovenox for DVT prophylaxis  We will consider suture removal in 2 to 3 weeks        Patient Disposition:  PACU         SIGNATURE: Ronnie Goldman MD  DATE: May 3, 2023  TIME: 8:10 AM

## 2023-05-03 NOTE — CASE MANAGEMENT
Case Management Discharge Planning Note    Patient name Francisco Warner  Location [address] [id number] MRN [id number]  : 1965 Date 5/3/2023       Current Admission Date: 2023  Current Admission Diagnosis:Closed fracture of medial plateau of right tibia   Patient Active Problem List    Diagnosis Date Noted   • MANNY (obstructive sleep apnea) 2023   • Closed fracture of medial plateau of right tibia 2023   • Depression and anxiety 2023   • Acquired genu varum of right lower extremity 2023   • MARKELL (acute kidney injury) ([facility] ) 2023   • Chronic kidney disease-mineral and bone disorder 2023   • Closed fracture of right tibial plateau    • Stage 3b chronic kidney disease ([facility] ) 11/15/2022   • Hyperkalemia 11/15/2022   • Alcoholism ([facility] ) 11/15/2022   • Dupuytren's contracture of right hand 2022   • Type 2 diabetes mellitus with diabetic peripheral angiopathy without gangrene ([facility] ) 2022   • Physical deconditioning 2021   • Acquired hypothyroidism 10/18/2021   • Acquired absence of right foot ([facility] ) 2021   • Persistent proteinuria 2021   • Abnormal EKG 2020   • Preoperative examination 2020   • Insomnia 2020   • Anemia 10/08/2020   • Hypomagnesemia 2020   • Urinary retention 2020   • Thyroid cancer ([facility] ) 2020   • PAD (peripheral artery disease) ([facility] ) 2020   • Health maintenance examination 2020   • Elevated liver enzymes 10/24/2019   • Colon polyps 2019   • Right-sided tinnitus 2018   • Hypertension, essential 2018   • Gastroesophageal reflux disease without esophagitis 2018   • Obesity, Class I, BMI 30 0-34 9 (see actual BMI) 2010   • Hyperlipidemia 2009   • History of nonadherence to medical treatment 2008      LOS (days): 2  Geometric Mean LOS (GMLOS) (days): 2 50  Days to GMLOS:0 8     OBJECTIVE:  Risk of Unplanned Readmission Score: 25 23 Current admission status: Inpatient   Preferred Pharmacy:   [address] [place] [address] [address] [facility], [address]  [place] [address]  Phone: [phone] Fax: [phone] [address] [place] [address] [facility] [address]  Phone: [phone] Fax: [phone]    Primary Care Provider: Simin Szymanski MD    Primary Insurance: BLUE CROSS  Secondary Insurance:     DISCHARGE DETAILS:    Discharge planning discussed with[de-identified] Patient  Freedom of Choice: Yes  Comments - Freedom of Choice: CM discussed freedom of choice as it pertains to discharge planning  CM reviewed STR recommendation, and patient is agreeable  He reported that he would refuse [facility] because he has been there in the past and had a negative experience, but he prefers [facility]  Patient also reported that he has a  through his insurance who has been working on placing him at [facility], but he does not remember her name  CM contacted family/caregiver?: No- see comments (Patient declined phone call )  Were Treatment Team discharge recommendations reviewed with patient/caregiver?: Yes  Did patient/caregiver verbalize understanding of patient care needs?: Yes  Were patient/caregiver advised of the risks associated with not following Treatment Team discharge recommendations?: Yes    [address]         Is the patient interested in [facility] at discharge?: No    DME Referral Provided  Referral made for DME?: No    Other Referral/Resources/Interventions Provided:  Interventions: Short Term Rehab  Referral Comments: CM sent a blanket referral to SNFs within a 20 mile radius to determine who can accept  CM then sent a message to [facility] letting them know that they are the preferred facility      Would you like to participate in our [address] service program?  : No - Declined    Treatment Team Recommendation: Short Term Rehab  Discharge Destination Plan[de-identified] Short Term Rehab  Transport at Discharge : [facility] Ambulance  Dispatcher Contacted:  No

## 2023-05-03 NOTE — PHYSICAL THERAPY NOTE
Physical Therapy Cancellation Note    Chart review performed  At this time, PT treatment session cancelled d/t pt refusal d/t 9/10 pain  Pt educated on importance of mobility to deter side effects of immobility, pt verbalized understanding but continued to decline  Pt reports he will try tomorrow  PT will follow and provide PT interventions as appropriate  RN made aware of cancellation and pain report      Indu Vick, PT, DPT

## 2023-05-03 NOTE — PROGRESS NOTES
"Progress Note - Orthopedics   Cadence Nance 59 y o  male MRN: [id number]  Unit/Bed#: -01      Subjective:    59 y o male POD#2 s/p ORIF Right tibial plateau nonunion, resting in bed and in no acute distress  Patient's knee immobilizer is intact upon evaluation this morning  Patient states he is having some pain in his leg today  He reports he declined to work with therapy yesterday, \"and I probably will tell them no again today\"       Labs:  0   Lab Value Date/Time    HCT 25 0 (L) 05/03/2023 0459    HCT 27 9 (L) 05/02/2023 0502    HCT 30 (L) 05/01/2023 1703    HCT 34 8 (L) 04/24/2023 1206    HGB 8 1 (L) 05/03/2023 0459    HGB 9 1 (L) 05/02/2023 0502    HGB 10 2 (L) 05/01/2023 1703    HGB 11 4 (L) 04/24/2023 1206    INR 1 04 08/04/2020 1358    WBC 19 42 (H) 05/03/2023 0459    WBC 13 40 (H) 05/02/2023 0502    WBC 10 33 (H) 04/24/2023 1206    ESR 24 (H) 02/21/2020 1101       Meds:    Current Facility-Administered Medications:   •  acetaminophen (TYLENOL) tablet 650 mg, 650 mg, Oral, Q8H PRN, Aung Stevens PA-C  •  aspirin chewable tablet 81 mg, 81 mg, Oral, Daily, Aung Stevens PA-C, 81 mg at 05/03/23 2065  •  atorvastatin (LIPITOR) tablet 40 mg, 40 mg, Oral, QAM, Aaron Wright PA-C, 40 mg at 05/03/23 0637  •  clopidogrel (PLAVIX) tablet 75 mg, 75 mg, Oral, QAM, Aaron Wright PA-C, 75 mg at 05/03/23 3042  •  docusate sodium (COLACE) capsule 100 mg, 100 mg, Oral, BID, Aaron Wright PA-C, 100 mg at 05/03/23 0144  •  enoxaparin (LOVENOX) subcutaneous injection 40 mg, 40 mg, Subcutaneous, Daily, Aung Stevens PA-C, 40 mg at 05/03/23 8807  •  escitalopram (LEXAPRO) tablet 10 mg, 10 mg, Oral, Daily, Aaron Wright PA-C, 10 mg at 05/03/23 1817  •  HYDROmorphone (DILAUDID) tablet 2 mg, 2 mg, Oral, Q6H PRN, Rhonda Gregory PA-C  •  HYDROmorphone (DILAUDID) tablet 4 mg, 4 mg, Oral, Q6H PRN, Ruth Ann Hargrove PA-C, 4 mg at 05/03/23 1257  •  HYDROmorphone HCl (DILAUDID) injection 0 2 mg, " 0 2 mg, Intravenous, Q3H PRN, Yesica Villanueva PA-C, 0 2 mg at 05/02/23 0405  •  insulin glargine (LANTUS) subcutaneous injection 25 Units 0 25 mL, 25 Units, Subcutaneous, HS, Sol Thomason MD, 25 Units at 05/02/23 2200  •  insulin lispro (HumaLOG) 100 units/mL subcutaneous injection 15 Units, 15 Units, Subcutaneous, TID With Meals, Eduard Garcia MD, 15 Units at 05/03/23 1145  •  insulin lispro (HumaLOG) 100 units/mL subcutaneous injection 2-12 Units, 2-12 Units, Subcutaneous, 4x Daily (AC & HS), 2 Units at 05/03/23 1145 **AND** Fingerstick Glucose (POCT), , , 4x Daily AC and at bedtime, Eduard Garcia MD  •  levothyroxine tablet 150 mcg, 150 mcg, Oral, QAM, Aaron Wright PA-C, 150 mcg at 05/03/23 7561  •  losartan (COZAAR) tablet 50 mg, 50 mg, Oral, Daily, 50 mg at 05/03/23 0914 **AND** [DISCONTINUED] hydrochlorothiazide (HYDRODIURIL) tablet 12 5 mg, 12 5 mg, Oral, Daily, [facility] NAILA Villa  •  pantoprazole (PROTONIX) EC tablet 40 mg, 40 mg, Oral, Early Morning, Aaron Wright PA-C, 40 mg at 05/03/23 4298  •  tamsulosin (FLOMAX) capsule 0 4 mg, 0 4 mg, Oral, Daily With Trev Carter PA-C, 0 4 mg at 05/02/23 1635  •  verapamil (CALAN-SR) CR tablet 360 mg, 360 mg, Oral, HS, Aaron Wright PA-C  •  zolpidem (AMBIEN) tablet 10 mg, 10 mg, Oral, HS PRN, Jossie Hernandez PA-C    Blood Culture:   Lab Results   Component Value Date    BLOODCX No Growth After 5 Days  10/10/2020    BLOODCX No Growth After 5 Days  10/10/2020       Wound Culture:   Lab Results   Component Value Date    WOUNDCULT 2+ Growth of Morganella morganii (A) 08/30/2020    WOUNDCULT (A) 08/30/2020     2+ Growth of Methicillin Resistant Staphylococcus aureus    WOUNDCULT 1+ Growth of Enterobacter cloacae complex (A) 08/30/2020    WOUNDCULT 1+ Growth of Enterococcus faecalis (A) 08/30/2020       Ins and Outs:  I/O last 24 hours: In: 3773 3 [P O :1140;  I V :2633 3]  Out: 1570 [Urine:1570]          Physical:  Vitals:    05/03/23 1454   BP: 108/65   Pulse:    Resp: 18   Temp: 99 6 °F (37 6 °C)   SpO2:      Musculoskeletal:   Right lower extremity  · Knee immobilizer intact  · Bulky dressing clean, dry and intact without evidence of strike through  · TTP throughout Right lower extremity  · Sensation intact to saphenous, sural, tibial, superficial peroneal nerve, and deep peroneal  · Motor intact to ankle dorsi/plantar flexion  · 2+ DP pulse, symmetric bilaterally  · Digits warm and well perfused  · Capillary refill < 2 seconds    Assessment:    58 y o male POD#2 s/p ORIF Right tibial plateau nonunion    Plan:  · Nonweightbearing Right lower extremity with knee immobilizer intact at all times   · Will monitor for ABLA and administer IVF/prbc as indicated for Greater than 2 gram drop or Hgb < 7  HGB today of 8 1  · PT/OT- up and out of bed, gait training while maintaining NWB status Right lower extremity  Educated patient about risk of DVT, bed sores, PNA if not getting up out of bed with therapy  Patient still declines  · Pain control  · DVT ppx- Lovenox 40mg once daily + Plavix   · Dispo: Discussed with CM today and likely discharge within 48 hours to rehab facility  Patient sodium today of 129 and SLIM will continue to monitor  Patient does not require additional orthopaedic surgical intervention at this time  Once patient medically stable, will place discharge to rehab facility and provide pain medication scripts upon discharge  Will discontinue IV dilaudid at that time       Maxine Dancer, PA-C

## 2023-05-03 NOTE — ASSESSMENT & PLAN NOTE
· Hold hctz  · Obtain urine studies  · Nephrology on board  · Etiology could be increased free water retaining vs SIADH

## 2023-05-03 NOTE — PROGRESS NOTES
"[facility]  Progress Note  Name: Lianet Aguilera  MRN: [id number]  Unit/Bed#: -01 I Date of Admission: 5/1/2023   Date of Service: 5/3/2023  Hospital Day: 2    Assessment/Plan   * Closed fracture of medial plateau of right tibia  Assessment & Plan  · S/p reconstruction of proximal tibia today however may require cement spacer as well as possible external fixator placement  · Pain management per primary team  Ortho primary    Hyponatremia  Assessment & Plan  · Hold hctz  · Obtain urine studies  · Nephrology on board  · Etiology could be increased free water retaining vs SIADH    MANNY (obstructive sleep apnea)  Assessment & Plan  · Continue CPAP, patient reported he is not compliant  · Importance of compliance with regimen    Stage 3b chronic kidney disease [facility])  Assessment & Plan  Lab Results   Component Value Date    EGFR 92 05/03/2023    EGFR 77 05/02/2023    EGFR 69 04/24/2023    CREATININE 0 90 05/03/2023    CREATININE 1 04 05/02/2023    CREATININE 1 14 04/24/2023     · Appears with baseline, stable  · Monitor BMP    Anemia  Assessment & Plan  · Possible acute blood loss anemia   · Monitor H&H BID   · Transfuse for <7  · Check ironpanel    PAD (peripheral artery disease) ([facility])  Assessment & Plan  · On aspirin, Plavix  · Continue if cleared by Orthopedics    Hypertension, essential  Assessment & Plan  /65   Pulse 85   Temp 99 1 °F (37 3 °C) (Oral)   Resp 18   Ht 6' 4\" (1 93 m)   Wt 122 kg (268 lb 15 4 oz)   SpO2 91%   BMI 32 74 kg/m²   · Stable pressures  Continue home HCTZ/irbesartan, verapamil  · Monitor BP           VTE Pharmacologic Prophylaxis:   lovenox    Patient Centered Rounds: I performed bedside rounds with nursing staff today  Discussions with Specialists or Other Care Team Provider: yes nephrology    Education and Discussions with Family / Patient: Updated  (wife) via phone      Total Time Spent on Date of Encounter in care of patient: 22 " minutes This time was spent on one or more of the following: performing physical exam; counseling and coordination of care; obtaining or reviewing history; documenting in the medical record; reviewing/ordering tests, medications or procedures; communicating with other healthcare professionals and discussing with patient's family/caregivers  Current Length of Stay: 2 day(s)  Current Patient Status: Inpatient   Certification Statement: The patient will continue to require additional inpatient hospital stay due to need for correction of low sodium and monitoring Hb  Discharge Plan: Anticipate discharge in 24-48 hrs to rehab facility  Code Status: Level 1 - Full Code    Subjective:   Seen and examined at bedside  Pain moderately controlled  No cp or sob or NVD   Plan to do QTC check if needs nausea meds    Objective:     Vitals:   Temp (24hrs), Av 2 °F (37 3 °C), Min:98 8 °F (37 1 °C), Max:99 7 °F (37 6 °C)    Temp:  [98 8 °F (37 1 °C)-99 7 °F (37 6 °C)] 99 1 °F (37 3 °C)  HR:  [85] 85  Resp:  [18] 18  BP: ()/(62-68) 105/65  SpO2:  [91 %] 91 %  Body mass index is 32 74 kg/m²  Input and Output Summary (last 24 hours):      Intake/Output Summary (Last 24 hours) at 5/3/2023 1042  Last data filed at 5/3/2023 8429  Gross per 24 hour   Intake 3026 67 ml   Output 1170 ml   Net 1856 67 ml       Physical Exam:   Physical Exam General- Awake, alert and oriented x 3, looks comfortable  HEENT- Normocephalic, atraumatic, oral mucosa- moist  Neck- Supple, No carotid bruit, no JVD  CVS- Normal S1/ S2, Regular rate and rhythm, No murmur, No edema  Respiratory system- B/L clear breath sounds, no wheezing  Abdomen- Soft, Non distended, no tenderness, Bowel sound- present 4 quads  Genitourinary- No suprapubic tenderness, No CVA tenderness  Skin- No new bruise or rash  Musculoskeletal- RLE in a brace  Psych- No acute psychosis  CNS- CN II- XII grossly intact, No acute focal neurologic deficit noted      Additional Data: Labs:  Results from last 7 days   Lab Units 05/03/23  0459   WBC Thousand/uL 19 42*   HEMOGLOBIN g/dL 8 1*   HEMATOCRIT % 25 0*   PLATELETS Thousands/uL 260   NEUTROS PCT % 78*   LYMPHS PCT % 8*   MONOS PCT % 13*   EOS PCT % 0     Results from last 7 days   Lab Units 05/03/23  0459   SODIUM mmol/L 129*   POTASSIUM mmol/L 3 9   CHLORIDE mmol/L 99   CO2 mmol/L 24   BUN mg/dL 22   CREATININE mg/dL 0 90   ANION GAP mmol/L 6   CALCIUM mg/dL 8 3*   GLUCOSE RANDOM mg/dL 181*         Results from last 7 days   Lab Units 05/03/23  0715 05/02/23  2010 05/02/23  1602 05/02/23  1106 05/02/23  0705 05/01/23  2119 05/01/23  1853 05/01/23  1814 05/01/23  1034   POC GLUCOSE mg/dl 185* 170* 158* 164* 181* 109 115 110 154*               Lines/Drains:  Invasive Devices     Peripheral Intravenous Line  Duration           Peripheral IV 05/01/23 Left;Dorsal (posterior) Hand 2 days    Peripheral IV 05/01/23 Distal;Dorsal (posterior); Right Forearm 1 day                      Imaging: No pertinent imaging reviewed      Recent Cultures (last 7 days):   Results from last 7 days   Lab Units 05/01/23  1358   GRAM STAIN RESULT  No Polys or Bacteria seen       Last 24 Hours Medication List:   Current Facility-Administered Medications   Medication Dose Route Frequency Provider Last Rate   • acetaminophen  650 mg Oral Q8H PRN Junito Grant PA-C     • aspirin  81 mg Oral Daily Junito Grant PA-C     • atorvastatin  40 mg Oral QAM Junito Grant PA-C     • clopidogrel  75 mg Oral QAM Aaron Wright PA-C     • docusate sodium  100 mg Oral BID Junito Grant PA-C     • enoxaparin  40 mg Subcutaneous Daily Junito Grant PA-C     • escitalopram  10 mg Oral Daily Junito Grant PA-C     • HYDROmorphone  2 mg Oral Q6H PRN Francisco Julien PA-C     • HYDROmorphone  4 mg Oral Q6H PRN Francisco Julien PA-C     • HYDROmorphone  0 2 mg Intravenous Q3H PRN Ruth Ann Stahler, PA-C     • insulin glargine  25 Units Subcutaneous HS Dahiana Yee MD     • insulin lispro  15 Units Subcutaneous TID With Meals [facility] Susie Morris MD     • insulin lispro  2-12 Units Subcutaneous 4x Daily [facility] Dahiana Yee MD     • levothyroxine  150 mcg Oral QAM Lynda Mistry PA-C     • losartan  50 mg Oral Daily [facility] NAILA Villa     • pantoprazole  40 mg Oral Early Morning Lynda Mistry PA-C     • tamsulosin  0 4 mg Oral Daily With Je Wright PA-C     • verapamil  360 mg Oral HS Lynda Mistry PA-C     • zolpidem  10 mg Oral HS PRN Lynda Mistry PA-C          Today, Patient Was Seen By: Cayetano Tello MD    **Please Note: This note may have been constructed using a voice recognition system  **

## 2023-05-03 NOTE — PROGRESS NOTES
NEPHROLOGY PROGRESS NOTE    Patient: Lidia Felix               Sex: male          DOA: 5/1/2023  9:52 AM   YOB: 1965        Age:  59 y o         LOS:  LOS: 2 days   5/3/2023    REASON FOR THE CONSULTATION:  CKD, MARKELL risk reduction    SUBJECTIVE     Seen and examined at the bedside, c/o persistent pain in RLE  Denies nausea, vomiting, shortness of breath  Reviewed past 24 hour events      CURRENT MEDICATIONS       Current Facility-Administered Medications:   •  acetaminophen (TYLENOL) tablet 650 mg, 650 mg, Oral, Q8H PRN, ePpe Larson PA-C  •  aspirin chewable tablet 81 mg, 81 mg, Oral, Daily, Pepe Larson PA-C, 81 mg at 05/03/23 8142  •  atorvastatin (LIPITOR) tablet 40 mg, 40 mg, Oral, QA, Aaron Wright PA-C, 40 mg at 05/03/23 4787  •  clopidogrel (PLAVIX) tablet 75 mg, 75 mg, Oral, QA, Aaron Wright PA-C, 75 mg at 05/03/23 2798  •  docusate sodium (COLACE) capsule 100 mg, 100 mg, Oral, BID, Pepe Larson PA-C, 100 mg at 05/03/23 0999  •  enoxaparin (LOVENOX) subcutaneous injection 40 mg, 40 mg, Subcutaneous, Daily, Pepe Larson PA-C, 40 mg at 05/03/23 2680  •  escitalopram (LEXAPRO) tablet 10 mg, 10 mg, Oral, Daily, Pepe Larson PA-C, 10 mg at 05/03/23 2982  •  HYDROmorphone (DILAUDID) tablet 2 mg, 2 mg, Oral, Q6H PRN, Lauren Gomes PA-C  •  HYDROmorphone (DILAUDID) tablet 4 mg, 4 mg, Oral, Q6H PRN, Lauren Gomes PA-C, 4 mg at 05/02/23 2328  •  HYDROmorphone HCl (DILAUDID) injection 0 2 mg, 0 2 mg, Intravenous, Q3H PRN, Lauren Gomes PA-C, 0 2 mg at 05/02/23 0405  •  insulin glargine (LANTUS) subcutaneous injection 25 Units 0 25 mL, 25 Units, Subcutaneous, HS, Jeramy Genao MD, 25 Units at 05/02/23 2200  •  insulin lispro (HumaLOG) 100 units/mL subcutaneous injection 15 Units, 15 Units, Subcutaneous, TID With Meals, Jeramy Genao MD  •  insulin lispro (HumaLOG) 100 units/mL subcutaneous injection 2-12 Units, 2-12 Units, Subcutaneous, 4x Daily (AC & HS), 2 Units at 05/03/23 0915 **AND** Fingerstick Glucose (POCT), , , 4x Daily AC and at bedtime, Kristie Rojas MD  •  levothyroxine tablet 150 mcg, 150 mcg, Oral, QAM, Junito Grant PA-C, 150 mcg at 05/03/23 7254  •  losartan (COZAAR) tablet 50 mg, 50 mg, Oral, Daily, 50 mg at 05/03/23 0914 **AND** [DISCONTINUED] hydrochlorothiazide (HYDRODIURIL) tablet 12 5 mg, 12 5 mg, Oral, Daily, NeelGalva NAILA Villa  •  pantoprazole (PROTONIX) EC tablet 40 mg, 40 mg, Oral, Early Morning, Aaron Wright PA-C, 40 mg at 05/03/23 6008  •  tamsulosin (FLOMAX) capsule 0 4 mg, 0 4 mg, Oral, Daily With Dinner, Junito Grant PA-C, 0 4 mg at 05/02/23 1635  •  verapamil (CALAN-SR) CR tablet 360 mg, 360 mg, Oral, HS, Aaron Wright PA-C  •  zolpidem (AMBIEN) tablet 10 mg, 10 mg, Oral, HS PRN, Junito Grant PA-C    REVIEW OF SYSTEMS     Review of Systems   Constitutional: Positive for activity change  Negative for chills, fatigue and fever  HENT: Negative for hearing loss, nosebleeds and trouble swallowing  Respiratory: Negative for cough and shortness of breath  Cardiovascular: Negative for chest pain and leg swelling  Gastrointestinal: Negative for constipation, diarrhea, nausea and vomiting  Genitourinary: Negative for difficulty urinating, dysuria, frequency and hematuria  Musculoskeletal: Negative for back pain  RLE pain   Skin: Negative for pallor  Neurological: Negative for dizziness, syncope, weakness and light-headedness  Psychiatric/Behavioral: Negative for sleep disturbance  The patient is not nervous/anxious  OBJECTIVE     Current Weight: Weight - Scale: 122 kg (268 lb 15 4 oz)  Vitals:    05/03/23 0714   BP: 105/65   Pulse:    Resp: 18   Temp: 99 1 °F (37 3 °C)   SpO2:      Body mass index is 32 74 kg/m²      Intake/Output Summary (Last 24 hours) at 5/3/2023 0944  Last data filed at 5/3/2023 0923  Gross per 24 hour   Intake 2786 67 ml   Output 1170 ml   Net 1616 67 ml       PHYSICAL EXAMINATION     Physical Exam  Vitals reviewed  Constitutional:       General: He is not in acute distress  HENT:      Head: Normocephalic  Mouth/Throat:      Lips: Pink  Mouth: Mucous membranes are moist    Eyes:      General: Lids are normal  No scleral icterus  Cardiovascular:      Rate and Rhythm: Normal rate and regular rhythm  Heart sounds: S1 normal and S2 normal  No murmur heard  Pulmonary:      Effort: Pulmonary effort is normal  No accessory muscle usage or respiratory distress  Abdominal:      General: There is no distension  Tenderness: There is no abdominal tenderness  Musculoskeletal:      Cervical back: Normal range of motion and neck supple  No tenderness  Comments: Ace wrap and immobilizer to RLE   Skin:     General: Skin is warm  Coloration: Skin is not cyanotic or jaundiced  Neurological:      General: No focal deficit present  Mental Status: He is alert and oriented to person, place, and time  Psychiatric:         Attention and Perception: Attention normal          Speech: Speech normal          Behavior: Behavior is cooperative  LAB RESULTS     Results from last 7 days   Lab Units 05/03/23  0459 05/02/23  0502 05/01/23  1703   WBC Thousand/uL 19 42* 13 40*  --    HEMOGLOBIN g/dL 8 1* 9 1*  --    I STAT HEMOGLOBIN g/dl  --   --  10 2*   HEMATOCRIT % 25 0* 27 9*  --    HEMATOCRIT, ISTAT %  --   --  30*   PLATELETS Thousands/uL 260 288  --    SODIUM mmol/L 129* 134*  --    POTASSIUM mmol/L 3 9 4 0  --    CHLORIDE mmol/L 99 102  --    CO2 mmol/L 24 22  --    CO2, I-STAT mmol/L  --   --  25   BUN mg/dL 22 20  --    CREATININE mg/dL 0 90 1 04  --    EGFR ml/min/1 73sq m 92 77  --    CALCIUM mg/dL 8 3* 8 8  --    GLUCOSE, ISTAT mg/dl  --   --  109       RADIOLOGY RESULTS      No results found for this or any previous visit      Results for orders placed during the hospital "encounter of 03/03/23    XR chest 2 views    Narrative  CHEST    INDICATION:   diminished L lung sounds  COMPARISON:  11/15/2018    EXAM PERFORMED/VIEWS:  XR CHEST PA & LATERAL      FINDINGS:    Marked patient rotation  Cardiomediastinal silhouette appears unremarkable  The lungs are clear  No pneumothorax or pleural effusion  Osseous structures appear within normal limits for patient age  Impression  No acute cardiopulmonary disease  Workstation performed: [id number]      ASSESSMENT/PLAN     78-year-old male with a past medical history of CKD stage III, hypertension, type 2 diabetes, peripheral arterial disease, and prior Goprelto fracture of the right tibia status post repair      Chronic kidney disease stage II/III:  After review of the medical record, it appears that baseline creatinine fluctuates from 0 9-1 1  He did have prior episodes of acute kidney injury in the past, but was admitted for procedure with creatinine level within suspected baseline  Etiology of CKD likely secondary to longstanding history of hypertension and diabetes      Creatinine remains stable at 0 90 following orthopedic surgery  Recommend avoidance of nephrotoxic agents such as NSAIDs for pain management  Maintain MAP > 65 to optimize renal perfusion  Recommend monitoring daily BMP while hospitalized      Hypertension:  Current blood pressure readings within acceptable range      Hyponatremia:  Sodium low at 129 on labs today  Unclear etiology at this time, may be multifactorial in setting of pain, IVF, and HCTZ  Will hold HCTZ at this time and stop IVF  Check urine sodium and urine osmolality  Q12H BMP      Anemia:  Current hemoglobin in 8 1, continue to monitor  Recommend transfusion for hemoglobin < 7 0  Yun Salgado  Nephrology  5/3/2023      Portions of the record may have been created with voice recognition software   Occasional wrong word or \"sound a like\" substitutions may " have occurred due to the inherent limitations of voice recognition software  Read the chart carefully and recognize, using context, where substitutions have occurred

## 2023-05-03 NOTE — PLAN OF CARE
Problem: PAIN - ADULT  Goal: Verbalizes/displays adequate comfort level or baseline comfort level  Description: Interventions:  - Encourage patient to monitor pain and request assistance  - Assess pain using appropriate pain scale  - Administer analgesics based on type and severity of pain and evaluate response  - Implement non-pharmacological measures as appropriate and evaluate response  - Consider cultural and social influences on pain and pain management  - Notify physician/advanced practitioner if interventions unsuccessful or patient reports new pain  Outcome: Progressing     Problem: INFECTION - ADULT  Goal: Absence or prevention of progression during hospitalization  Description: INTERVENTIONS:  - Assess and monitor for signs and symptoms of infection  - Monitor lab/diagnostic results  - Monitor all insertion sites, i e  indwelling lines, tubes, and drains  - Monitor endotracheal if appropriate and nasal secretions for changes in amount and color  - [place] appropriate cooling/warming therapies per order  - Administer medications as ordered  - Instruct and encourage patient and family to use good hand hygiene technique  - Identify and instruct in appropriate isolation precautions for identified infection/condition  Outcome: Progressing  Goal: Absence of fever/infection during neutropenic period  Description: INTERVENTIONS:  - Monitor WBC    Outcome: Progressing     Problem: SAFETY ADULT  Goal: Patient will remain free of falls  Description: INTERVENTIONS:  - Educate patient/family on patient safety including physical limitations  - Instruct patient to call for assistance with activity   - Consult OT/PT to assist with strengthening/mobility   - Keep Call bell within reach  - Keep bed low and locked with side rails adjusted as appropriate  - Keep care items and personal belongings within reach  - Initiate and maintain comfort rounds  - Make Fall Risk Sign visible to staff  - Offer Toileting every  Hours, in advance of need  - Initiate/Maintain alarm  - Obtain necessary fall risk management equipment:   - Apply yellow socks and bracelet for high fall risk patients  - Consider moving patient to room near nurses station  Outcome: Progressing  Goal: Maintain or return to baseline ADL function  Description: INTERVENTIONS:  -  Assess patient's ability to carry out ADLs; assess patient's baseline for ADL function and identify physical deficits which impact ability to perform ADLs (bathing, care of mouth/teeth, toileting, grooming, dressing, etc )  - Assess/evaluate cause of self-care deficits   - Assess range of motion  - Assess patient's mobility; develop plan if impaired  - Assess patient's need for assistive devices and provide as appropriate  - Encourage maximum independence but intervene and supervise when necessary  - Involve family in performance of ADLs  - Assess for home care needs following discharge   - Consider OT consult to assist with ADL evaluation and planning for discharge  - Provide patient education as appropriate  Outcome: Progressing  Goal: Maintains/Returns to pre admission functional level  Description: INTERVENTIONS:  - Perform BMAT or MOVE assessment daily    - Set and communicate daily mobility goal to care team and patient/family/caregiver  - Collaborate with rehabilitation services on mobility goals if consulted  - Perform Range of Motion  times a day  - Reposition patient every  hours    - Dangle patient  times a day  - Stand patient  times a day  - Ambulate patient  times a day  - Out of bed to chair  times a day   - Out of bed for meals  times a day  - Out of bed for toileting  - Record patient progress and toleration of activity level   Outcome: Progressing     Problem: DISCHARGE PLANNING  Goal: Discharge to home or other facility with appropriate resources  Description: INTERVENTIONS:  - Identify barriers to discharge w/patient and caregiver  - Arrange for needed discharge resources and transportation as appropriate  - Identify discharge learning needs (meds, wound care, etc )  - Arrange for interpretive services to assist at discharge as needed  - Refer to Case Management Department for coordinating discharge planning if the patient needs post-hospital services based on physician/advanced practitioner order or complex needs related to functional status, cognitive ability, or social support system  Outcome: Progressing     Problem: Knowledge Deficit  Goal: Patient/family/caregiver demonstrates understanding of disease process, treatment plan, medications, and discharge instructions  Description: Complete learning assessment and assess knowledge base    Interventions:  - Provide teaching at level of understanding  - Provide teaching via preferred learning methods  Outcome: Progressing     Problem: SKIN/TISSUE INTEGRITY - ADULT  Goal: Skin Integrity remains intact(Skin Breakdown Prevention)  Description: Assess:  -Perform Fabrice assessment every   -Clean and moisturize skin every   -Inspect skin when repositioning, toileting, and assisting with ADLS  -Assess under medical devices such as  every   -Assess extremities for adequate circulation and sensation     Bed Management:  -Have minimal linens on bed & keep smooth, unwrinkled  -Change linens as needed when moist or perspiring  -Avoid sitting or lying in one position for more than  hours while in bed  -Keep HOB at degrees     Toileting:  -Offer bedside commode  -Assess for incontinence every   -Use incontinent care products after each incontinent episode such as     Activity:  -Mobilize patient  times a day  -Encourage activity and walks on unit  -Encourage or provide ROM exercises   -Turn and reposition patient every  Hours  -Use appropriate equipment to lift or move patient in bed  -Instruct/ Assist with weight shifting every  when out of bed in chair  -Consider limitation of chair time  hour intervals    Skin Care:  -Avoid use of baby powder, tape, friction and shearing, hot water or constrictive clothing  -Relieve pressure over bony prominences using   -Do not massage red bony areas    Next Steps:  -Teach patient strategies to minimize risks such as    -Consider consults to  interdisciplinary teams such as   Outcome: Progressing  Goal: Incision(s), wounds(s) or drain site(s) healing without S/S of infection  Description: INTERVENTIONS  - Assess and document dressing, incision, wound bed, drain sites and surrounding tissue  - Provide patient and family education  - Perform skin care/dressing changes every   Outcome: Progressing  Goal: Pressure injury heals and does not worsen  Description: Interventions:  - Implement low air loss mattress or specialty surface (Criteria met)  - Apply silicone foam dressing  - Instruct/assist with weight shifting every  minutes when in chair   - Limit chair time to  hour intervals  - Use special pressure reducing interventions such as  when in chair   - Apply fecal or urinary incontinence containment device   - Perform passive or active ROM every   - Turn and reposition patient & offload bony prominences every  hours   - Utilize friction reducing device or surface for transfers   - Consider consults to  interdisciplinary teams such as   - Use incontinent care products after each incontinent episode such as  - Consider nutrition services referral as needed  Outcome: Progressing     Problem: Prexisting or High Potential for Compromised Skin Integrity  Goal: Skin integrity is maintained or improved  Description: INTERVENTIONS:  - Identify patients at risk for skin breakdown  - Assess and monitor skin integrity  - Assess and monitor nutrition and hydration status  - Monitor labs   - Assess for incontinence   - Turn and reposition patient  - Assist with mobility/ambulation  - Relieve pressure over bony prominences  - Avoid friction and shearing  - Provide appropriate hygiene as needed including keeping skin clean and dry  - Evaluate need for skin moisturizer/barrier cream  - Collaborate with interdisciplinary team   - Patient/family teaching  - Consider wound care consult   Outcome: Progressing     Problem: MOBILITY - ADULT  Goal: Maintain or return to baseline ADL function  Description: INTERVENTIONS:  -  Assess patient's ability to carry out ADLs; assess patient's baseline for ADL function and identify physical deficits which impact ability to perform ADLs (bathing, care of mouth/teeth, toileting, grooming, dressing, etc )  - Assess/evaluate cause of self-care deficits   - Assess range of motion  - Assess patient's mobility; develop plan if impaired  - Assess patient's need for assistive devices and provide as appropriate  - Encourage maximum independence but intervene and supervise when necessary  - Involve family in performance of ADLs  - Assess for home care needs following discharge   - Consider OT consult to assist with ADL evaluation and planning for discharge  - Provide patient education as appropriate  Outcome: Progressing  Goal: Maintains/Returns to pre admission functional level  Description: INTERVENTIONS:  - Perform BMAT or MOVE assessment daily    - Set and communicate daily mobility goal to care team and patient/family/caregiver  - Collaborate with rehabilitation services on mobility goals if consulted  - Perform Range of Motion  times a day  - Reposition patient every  hours    - Dangle patient  times a day  - Stand patient  times a day  - Ambulate patient  times a day  - Out of bed to chair  times a day   - Out of bed for meals  times a day  - Out of bed for toileting  - Record patient progress and toleration of activity level   Outcome: Progressing

## 2023-05-04 PROBLEM — D62 ACUTE BLOOD LOSS ANEMIA: Status: ACTIVE | Noted: 2020-10-08

## 2023-05-04 LAB
ANION GAP SERPL CALCULATED.3IONS-SCNC: 7 MMOL/L (ref 4–13)
ANION GAP SERPL CALCULATED.3IONS-SCNC: 8 MMOL/L (ref 4–13)
BACTERIA SPEC ANAEROBE CULT: NO GROWTH
BACTERIA TISS AEROBE CULT: NO GROWTH
BASOPHILS # BLD AUTO: 0.05 THOUSANDS/ÂΜL (ref 0–0.1)
BASOPHILS NFR BLD AUTO: 0 % (ref 0–1)
BUN SERPL-MCNC: 16 MG/DL (ref 5–25)
BUN SERPL-MCNC: 19 MG/DL (ref 5–25)
CALCIUM SERPL-MCNC: 8.5 MG/DL (ref 8.4–10.2)
CALCIUM SERPL-MCNC: 8.5 MG/DL (ref 8.4–10.2)
CHLORIDE SERPL-SCNC: 100 MMOL/L (ref 96–108)
CHLORIDE SERPL-SCNC: 100 MMOL/L (ref 96–108)
CO2 SERPL-SCNC: 24 MMOL/L (ref 21–32)
CO2 SERPL-SCNC: 24 MMOL/L (ref 21–32)
CREAT SERPL-MCNC: 0.68 MG/DL (ref 0.6–1.3)
CREAT SERPL-MCNC: 0.75 MG/DL (ref 0.6–1.3)
EOSINOPHIL # BLD AUTO: 0.05 THOUSAND/ÂΜL (ref 0–0.61)
EOSINOPHIL NFR BLD AUTO: 0 % (ref 0–6)
ERYTHROCYTE [DISTWIDTH] IN BLOOD BY AUTOMATED COUNT: 12.9 % (ref 11.6–15.1)
GFR SERPL CREATININE-BSD FRML MDRD: 103 ML/MIN/1.73SQ M
GFR SERPL CREATININE-BSD FRML MDRD: 99 ML/MIN/1.73SQ M
GLUCOSE SERPL-MCNC: 161 MG/DL (ref 65–140)
GLUCOSE SERPL-MCNC: 163 MG/DL (ref 65–140)
GLUCOSE SERPL-MCNC: 169 MG/DL (ref 65–140)
GLUCOSE SERPL-MCNC: 174 MG/DL (ref 65–140)
GLUCOSE SERPL-MCNC: 183 MG/DL (ref 65–140)
GLUCOSE SERPL-MCNC: 194 MG/DL (ref 65–140)
GRAM STN SPEC: NORMAL
HCT VFR BLD AUTO: 24.4 % (ref 36.5–49.3)
HCT VFR BLD AUTO: 24.8 % (ref 36.5–49.3)
HGB BLD-MCNC: 8 G/DL (ref 12–17)
HGB BLD-MCNC: 8.1 G/DL (ref 12–17)
IMM GRANULOCYTES # BLD AUTO: 0.14 THOUSAND/UL (ref 0–0.2)
IMM GRANULOCYTES NFR BLD AUTO: 1 % (ref 0–2)
LYMPHOCYTES # BLD AUTO: 1.11 THOUSANDS/ÂΜL (ref 0.6–4.47)
LYMPHOCYTES NFR BLD AUTO: 7 % (ref 14–44)
MCH RBC QN AUTO: 30.1 PG (ref 26.8–34.3)
MCHC RBC AUTO-ENTMCNC: 32.8 G/DL (ref 31.4–37.4)
MCV RBC AUTO: 92 FL (ref 82–98)
MONOCYTES # BLD AUTO: 1.58 THOUSAND/ÂΜL (ref 0.17–1.22)
MONOCYTES NFR BLD AUTO: 10 % (ref 4–12)
NEUTROPHILS # BLD AUTO: 13.26 THOUSANDS/ÂΜL (ref 1.85–7.62)
NEUTS SEG NFR BLD AUTO: 82 % (ref 43–75)
NRBC BLD AUTO-RTO: 0 /100 WBCS
PLATELET # BLD AUTO: 263 THOUSANDS/UL (ref 149–390)
PMV BLD AUTO: 10.9 FL (ref 8.9–12.7)
POTASSIUM SERPL-SCNC: 3.4 MMOL/L (ref 3.5–5.3)
POTASSIUM SERPL-SCNC: 3.6 MMOL/L (ref 3.5–5.3)
RBC # BLD AUTO: 2.66 MILLION/UL (ref 3.88–5.62)
SODIUM SERPL-SCNC: 131 MMOL/L (ref 135–147)
SODIUM SERPL-SCNC: 132 MMOL/L (ref 135–147)
WBC # BLD AUTO: 16.19 THOUSAND/UL (ref 4.31–10.16)

## 2023-05-04 RX ADMIN — TAMSULOSIN HYDROCHLORIDE 0.4 MG: 0.4 CAPSULE ORAL at 17:38

## 2023-05-04 RX ADMIN — VERAPAMIL HYDROCHLORIDE 360 MG: 180 TABLET ORAL at 21:10

## 2023-05-04 RX ADMIN — ESCITALOPRAM OXALATE 10 MG: 10 TABLET, FILM COATED ORAL at 09:31

## 2023-05-04 RX ADMIN — ATORVASTATIN CALCIUM 40 MG: 40 TABLET, FILM COATED ORAL at 09:31

## 2023-05-04 RX ADMIN — ZOLPIDEM TARTRATE 10 MG: 5 TABLET, COATED ORAL at 21:10

## 2023-05-04 RX ADMIN — ACETAMINOPHEN 650 MG: 325 TABLET ORAL at 21:10

## 2023-05-04 RX ADMIN — SODIUM CHLORIDE 200 MG: 900 INJECTION, SOLUTION INTRAVENOUS at 09:33

## 2023-05-04 RX ADMIN — CLOPIDOGREL BISULFATE 75 MG: 75 TABLET ORAL at 09:31

## 2023-05-04 RX ADMIN — INSULIN LISPRO 2 UNITS: 100 INJECTION, SOLUTION INTRAVENOUS; SUBCUTANEOUS at 09:33

## 2023-05-04 RX ADMIN — ASPIRIN 81 MG: 81 TABLET, CHEWABLE ORAL at 09:31

## 2023-05-04 RX ADMIN — DOCUSATE SODIUM 100 MG: 100 CAPSULE, LIQUID FILLED ORAL at 09:31

## 2023-05-04 RX ADMIN — LEVOTHYROXINE SODIUM 150 MCG: 150 TABLET ORAL at 09:31

## 2023-05-04 RX ADMIN — INSULIN LISPRO 2 UNITS: 100 INJECTION, SOLUTION INTRAVENOUS; SUBCUTANEOUS at 17:39

## 2023-05-04 RX ADMIN — INSULIN GLARGINE 25 UNITS: 100 INJECTION, SOLUTION SUBCUTANEOUS at 21:10

## 2023-05-04 NOTE — PLAN OF CARE
Problem: PAIN - ADULT  Goal: Verbalizes/displays adequate comfort level or baseline comfort level  Description: Interventions:  - Encourage patient to monitor pain and request assistance  - Assess pain using appropriate pain scale  - Administer analgesics based on type and severity of pain and evaluate response  - Implement non-pharmacological measures as appropriate and evaluate response  - Consider cultural and social influences on pain and pain management  - Notify physician/advanced practitioner if interventions unsuccessful or patient reports new pain  Outcome: Progressing     Problem: INFECTION - ADULT  Goal: Absence or prevention of progression during hospitalization  Description: INTERVENTIONS:  - Assess and monitor for signs and symptoms of infection  - Monitor lab/diagnostic results  - Monitor all insertion sites, i e  indwelling lines, tubes, and drains  - Monitor endotracheal if appropriate and nasal secretions for changes in amount and color  - [place] appropriate cooling/warming therapies per order  - Administer medications as ordered  - Instruct and encourage patient and family to use good hand hygiene technique  - Identify and instruct in appropriate isolation precautions for identified infection/condition  Outcome: Progressing  Goal: Absence of fever/infection during neutropenic period  Description: INTERVENTIONS:  - Monitor WBC    Outcome: Progressing     Problem: SAFETY ADULT  Goal: Patient will remain free of falls  Description: INTERVENTIONS:  - Educate patient/family on patient safety including physical limitations  - Instruct patient to call for assistance with activity   - Consult OT/PT to assist with strengthening/mobility   - Keep Call bell within reach  - Keep bed low and locked with side rails adjusted as appropriate  - Keep care items and personal belongings within reach  - Initiate and maintain comfort rounds  - Make Fall Risk Sign visible to staff  - Offer Toileting every 2 Hours, in advance of need  - Initiate/Maintain bed alarm  - Obtain necessary fall risk management equipment: yes   - Apply yellow socks and bracelet for high fall risk patients  - Consider moving patient to room near nurses station  Outcome: Progressing     Problem: DISCHARGE PLANNING  Goal: Discharge to home or other facility with appropriate resources  Description: INTERVENTIONS:  - Identify barriers to discharge w/patient and caregiver  - Arrange for needed discharge resources and transportation as appropriate  - Identify discharge learning needs (meds, wound care, etc )  - Arrange for interpretive services to assist at discharge as needed  - Refer to Case Management Department for coordinating discharge planning if the patient needs post-hospital services based on physician/advanced practitioner order or complex needs related to functional status, cognitive ability, or social support system  Outcome: Progressing     Problem: Knowledge Deficit  Goal: Patient/family/caregiver demonstrates understanding of disease process, treatment plan, medications, and discharge instructions  Description: Complete learning assessment and assess knowledge base    Interventions:  - Provide teaching at level of understanding  - Provide teaching via preferred learning methods  Outcome: Progressing        Problem: Prexisting or High Potential for Compromised Skin Integrity  Goal: Skin integrity is maintained or improved  Description: INTERVENTIONS:  - Identify patients at risk for skin breakdown  - Assess and monitor skin integrity  - Assess and monitor nutrition and hydration status  - Monitor labs   - Assess for incontinence   - Turn and reposition patient  - Assist with mobility/ambulation  - Relieve pressure over bony prominences  - Avoid friction and shearing  - Provide appropriate hygiene as needed including keeping skin clean and dry  - Evaluate need for skin moisturizer/barrier cream  - Collaborate with interdisciplinary team   - Patient/family teaching  - Consider wound care consult   Outcome: Progressing     Problem: MOBILITY - ADULT  Goal: Maintain or return to baseline ADL function  Description: INTERVENTIONS:  -  Assess patient's ability to carry out ADLs; assess patient's baseline for ADL function and identify physical deficits which impact ability to perform ADLs (bathing, care of mouth/teeth, toileting, grooming, dressing, etc )  - Assess/evaluate cause of self-care deficits   - Assess range of motion  - Assess patient's mobility; develop plan if impaired  - Assess patient's need for assistive devices and provide as appropriate  - Encourage maximum independence but intervene and supervise when necessary  - Involve family in performance of ADLs  - Assess for home care needs following discharge   - Consider OT consult to assist with ADL evaluation and planning for discharge  - Provide patient education as appropriate  Outcome: Progressing

## 2023-05-04 NOTE — DISCHARGE INSTR - AVS FIRST PAGE
Discharge Instructions - Orthopedics  Dale Maharaj 59 y o  male MRN: [id number]  Unit/Bed#: -    Weight Bearing Status:                                           Non-weight bearing right lower extremity with knee immobilizer on at all times     DVT prophylaxis  Continue Plavix as previously directed  Lovenox 40mg daily  x  6 weeks post operatively      Pain:  Continue analgesics as directed    Dressing Instructions:   Okay To begin showering at this time  Allow water to flow over the incision sites  Do not vigorously scrub or soak wounds until otherwise stated  After dressings are off, continue to maintain knee immobilizer  May cover incision sites with ABD pad and ACE wrap as needed  Appt Instructions: If you do not have your appointment, please call the clinic at [phone] t  Otherwise followup as scheduled     Contact the office sooner if you experience any increased numbness/tingling in the extremities

## 2023-05-04 NOTE — PHYSICAL THERAPY NOTE
"Physical Therapy Treatment Note       05/04/23 0906   PT Last Visit   PT Visit Date 05/04/23   Note Type   Note Type Treatment   Pain Assessment   Pain Assessment Tool 0-10   Pain Score 9   Pain Location/Orientation Orientation: Right;Location: Knee   Restrictions/Precautions   Weight Bearing Precautions Per Order Yes   RLE Weight Bearing Per Order NWB   Braces or Orthoses Knee immobilizer  (adjustment made)   Other Precautions Bed Alarm; Fall Risk;Pain;WBS  (lightheadedness)   General   Chart Reviewed Yes   Response to Previous Treatment Patient with no complaints from previous session  Family/Caregiver Present No   Cognition   Overall Cognitive Status WFL   Arousal/Participation Alert; Responsive; Cooperative   Attention Within functional limits   Orientation Level Oriented X4   Memory Within functional limits   Following Commands Follows all commands and directions without difficulty   Comments pt agreeable to PT session   Subjective   Subjective \"I feel nauseous\"   Bed Mobility   Rolling R 4  Minimal assistance   Additional items Assist x 1;HOB elevated; Bedrails; Increased time required;Verbal cues;LE management   Rolling L 4  Minimal assistance   Additional items Assist x 1;HOB elevated; Bedrails; Increased time required;Verbal cues;LE management   Supine to Sit 4  Minimal assistance   Additional items Assist x 2;HOB elevated; Bedrails; Increased time required;Verbal cues;LE management   Sit to Supine 4  Minimal assistance   Additional items Assist x 2;HOB elevated; Bedrails; Increased time required;Verbal cues;LE management   Additional Comments BP taken supine prior to mobility: 152/83mmHg  BP taken upon seated at /59mmHg  BP taken following 5min seated at EOB: 98/59mmHg  BP taken 10min following return BTB: 147/75mmHg  Pt c/o feeling lightheaded t/o duration of session, did worsen while seated at EOB, reference previous vitals taken  RN aware   further mobility deferred at this time   Transfers   Sit to " Stand Unable to assess   Balance   Static Sitting Fair +   Dynamic Sitting Fair   Static Standing   (DNT)   Endurance Deficit   Endurance Deficit Yes   Activity Tolerance   Activity Tolerance Patient limited by pain; Patient limited by fatigue   Nurse Made Aware RN [facility]   Exercises   Knee AROM Long Arc Quad Sitting;10 reps;AROM; Left   Marching Sitting;10 reps;AROM; Left   Assessment   Prognosis Fair   Problem List Decreased strength;Decreased range of motion;Decreased endurance; Impaired balance;Decreased mobility;Orthopedic restrictions;Pain; Impaired sensation   Assessment Pt seen for PT treatment session this date, consisting of ther act focused on bed mobility, supine to sit transfer, sitting balance/posture facilitation for 6min duration, seated LB exercises while at EOB  Since previous session, pt has made good progress in terms of decreased A for rolling, initiation of sitting at EOB  Pt limited by pain and lightheadedness; BP taken (see above)  Pertinent barriers during this session include pain  Current goals and POC remain appropriate, pt continues to have rehab potential and is making fair progress towards STGs  Pt prognosis for achieving goals is fair, pending pt progress with hospitalization/medical status improvements, and indicated by [facility], ability to follow directions and self-expression (thoughts, feelings, needs)  Pt limited d/t the presence of intractable pain, fear of pain provocation and self limiting  PT recommends post acute rehabilitation services upon discharge  Pt continues to be functioning below baseline level, and remains limited 2* factors listed above  PT will continue to see pt during current hospitalization in order to address the deficits listed above and provide interventions consistent w/ POC in effort to achieve STGs     Barriers to Discharge Inaccessible home environment;Decreased caregiver support   Goals   Patient Goals to have less pain   STG Expiration Date 05/12/23 Short Term Goal #1 Additional: Increase static and dynamic sitting balance 1/2 grade to decrease risk for falls and Tolerate seated at EOB 30 minutes to facilitate functional task performance   PT Treatment Day 1   Plan   Treatment/Interventions LE strengthening/ROM; Therapeutic exercise; Endurance training;Patient/family training;Equipment eval/education; Bed mobility;Continued evaluation;Spoke to nursing;OT   Progress Progressing toward goals   PT Frequency 4-6x/wk   Recommendation   PT Discharge Recommendation Post acute rehabilitation services   Equipment Recommended   (TBD)   AM-PAC Basic Mobility Inpatient   Turning in Flat Bed Without Bedrails 3   Lying on Back to Sitting on Edge of Flat Bed Without Bedrails 2   Moving Bed to Chair 2   Standing Up From Chair Using Arms 1   Walk in Room 1   Climb 3-5 Stairs With Railing 1   Basic Mobility Inpatient Raw Score 10   Turning Head Towards Sound 4   Follow Simple Instructions 3   Low Function Basic Mobility Raw Score  17   Low Function Basic Mobility Standardized Score  27 46   Highest Level Of Mobility   JH-HLM Goal 4: Move to chair/commode   JH-HLM Achieved 3: Sit at edge of bed   Education   Education Provided Mobility training   Patient Reinforcement needed   End of Consult   Patient Position at End of Consult Supine; All needs within reach;Bed/Chair alarm activated       Dionne Koehler, PT, DPT

## 2023-05-04 NOTE — ASSESSMENT & PLAN NOTE
· Baseline appears to be 0 9-1 1  · Stable  · Avoid nephrotoxins; hypotension  · Nephrology on board  · Monitor BMP

## 2023-05-04 NOTE — PROGRESS NOTES
Progress Note - Orthopedics   Nolvia Yen 59 y o  male MRN: [id number]  Unit/Bed#: -01      Subjective:    59 y o male POD#3 s/p ORIF Right tibial plateau nonunion, resting comfortably in bed  Today, patient reports he is very nauseous from his pain medications  He admits he has not yet been up with therapy and wishes they would come later in the day  Knee immobilizer intact upon evaluation this morning       Labs:  0   Lab Value Date/Time    HCT 24 4 (L) 05/03/2023 2038    HCT 25 0 (L) 05/03/2023 0459    HCT 27 9 (L) 05/02/2023 0502    HGB 8 0 (L) 05/03/2023 2038    HGB 8 1 (L) 05/03/2023 0459    HGB 9 1 (L) 05/02/2023 0502    INR 1 04 08/04/2020 1358    WBC 19 42 (H) 05/03/2023 0459    WBC 13 40 (H) 05/02/2023 0502    WBC 10 33 (H) 04/24/2023 1206    ESR 24 (H) 02/21/2020 1101       Meds:    Current Facility-Administered Medications:   •  acetaminophen (TYLENOL) tablet 650 mg, 650 mg, Oral, Q8H PRN, Rip Mcneil PA-C  •  aspirin chewable tablet 81 mg, 81 mg, Oral, Daily, Rip Mcneil PA-C, 81 mg at 05/03/23 6156  •  atorvastatin (LIPITOR) tablet 40 mg, 40 mg, Oral, QA, Aaron Wright PA-C, 40 mg at 05/03/23 2337  •  clopidogrel (PLAVIX) tablet 75 mg, 75 mg, Oral, QAM, Aaron Wright PA-C, 75 mg at 05/03/23 1790  •  docusate sodium (COLACE) capsule 100 mg, 100 mg, Oral, BID, Aaron Wright PA-C, 100 mg at 05/03/23 1739  •  enoxaparin (LOVENOX) subcutaneous injection 40 mg, 40 mg, Subcutaneous, Daily, Rip Mcneil PA-C, 40 mg at 05/03/23 0675  •  escitalopram (LEXAPRO) tablet 10 mg, 10 mg, Oral, Daily, Aaron Wright PA-C, 10 mg at 05/03/23 3348  •  HYDROmorphone (DILAUDID) tablet 2 mg, 2 mg, Oral, Q6H PRN, Ekta Fofana PA-C  •  HYDROmorphone (DILAUDID) tablet 4 mg, 4 mg, Oral, Q6H PRN, Ruth Ann Hargrove PA-C, 4 mg at 05/03/23 2055  •  HYDROmorphone HCl (DILAUDID) injection 0 2 mg, 0 2 mg, Intravenous, Q3H PRN, Ekta Fofana PA-C, 0 2 mg at 05/02/23 0405  •  insulin glargine (LANTUS) subcutaneous injection 25 Units 0 25 mL, 25 Units, Subcutaneous, HS, Sol Navarro MD, 25 Units at 05/03/23 2211  •  insulin lispro (HumaLOG) 100 units/mL subcutaneous injection 15 Units, 15 Units, Subcutaneous, TID With Meals, Daphnie Vieyra MD, 15 Units at 05/03/23 1145  •  insulin lispro (HumaLOG) 100 units/mL subcutaneous injection 2-12 Units, 2-12 Units, Subcutaneous, 4x Daily (AC & HS), 2 Units at 05/03/23 1145 **AND** Fingerstick Glucose (POCT), , , 4x Daily AC and at bedtime, Daphnie Vieyra MD  •  levothyroxine tablet 150 mcg, 150 mcg, Oral, QAM, Aaron Wright PA-C, 150 mcg at 05/03/23 3531  •  losartan (COZAAR) tablet 50 mg, 50 mg, Oral, Daily, 50 mg at 05/03/23 0914 **AND** [DISCONTINUED] hydrochlorothiazide (HYDRODIURIL) tablet 12 5 mg, 12 5 mg, Oral, Daily, [facility] NAILA Villa  •  pantoprazole (PROTONIX) EC tablet 40 mg, 40 mg, Oral, Early Morning, Aaron Wright PA-C, 40 mg at 05/03/23 1675  •  tamsulosin (FLOMAX) capsule 0 4 mg, 0 4 mg, Oral, Daily With Dinner, Yovany Guaman PA-C, 0 4 mg at 05/03/23 1739  •  verapamil (CALAN-SR) CR tablet 360 mg, 360 mg, Oral, HS, Aaron Wright PA-C  •  zolpidem (AMBIEN) tablet 10 mg, 10 mg, Oral, HS PRN, Yovany Guaman PA-C    Blood Culture:   Lab Results   Component Value Date    BLOODCX No Growth After 5 Days  10/10/2020    BLOODCX No Growth After 5 Days  10/10/2020       Wound Culture:   Lab Results   Component Value Date    WOUNDCULT 2+ Growth of Morganella morganii (A) 08/30/2020    WOUNDCULT (A) 08/30/2020     2+ Growth of Methicillin Resistant Staphylococcus aureus    WOUNDCULT 1+ Growth of Enterobacter cloacae complex (A) 08/30/2020    WOUNDCULT 1+ Growth of Enterococcus faecalis (A) 08/30/2020       Ins and Outs:  I/O last 24 hours: In: 2233 3 [P O :600;  I V :1633 3]  Out: 1975 [GTGHN:1531]          Physical:  Vitals:    05/03/23 1454   BP: 108/65   Pulse: 85 Resp: 18   Temp: 99 6 °F (37 6 °C)   SpO2: 91%     Musculoskeletal:  Right lower extremity  • Knee immobilizer intact upon evaluation  • Bulky dressing clean, dry and intact without evidence of strike through  • TTP throughout Right lower extremity  • Sensation intact to saphenous, sural, tibial, superficial peroneal nerve, and deep peroneal  • Motor intact to ankle dorsi/plantar flexion  • 2+ DP pulse, symmetric bilaterally  • Extremity warm and well perfused  • Capillary refill < 2 seconds    Assessment:    58 y o male POD#3 s/p ORIF Right tibial plateau nonunion    Plan:  • Nonweightbearing Right lower extremity with knee immobilizer intact at all times   • Will monitor for ABLA and administer IVF/prbc as indicated for Greater than 2 gram drop or Hgb < 7  Awaiting HGB today  Last H&H on 05/03/ at 20:38 of 8 0  • PT/OT- up and out of bed, gait training while maintaining NWB status Right lower extremity  Educated patient about risk of DVT, bed sores, PNA if not getting up out of bed with therapy  • Pain control  • DVT ppx- Lovenox 40mg once daily + Plavix   • Dispo: As per SLIM  Patient does not require additional orthopaedic surgical intervention at this time and is ortho stable  Once patient medically stable, will place discharge to rehab facility and provide pain medication scripts upon discharge  Will discontinue IV dilaudid at that time  • Encouraged working with therapists, getting up and out of bed       Lisa Guevara PA-C

## 2023-05-04 NOTE — PROGRESS NOTES
[address] [facility]  Progress Note  Name: Chyna Franklin  MRN: [id number]  Unit/Bed#: -01 I Date of Admission: 5/1/2023   Date of Service: 5/4/2023 I Hospital Day: 3    Assessment/Plan   * Closed fracture of medial plateau of right tibia  Assessment & Plan  · S/p reconstruction of proximal tibia today however may require cement spacer as well as possible external fixator placement  · Pain management per primary team  Ortho primary    Hyponatremia  Assessment & Plan  · Unclear etiology; noted to be 129 yesterday  · Multifactorial in setting of IVF/Hctz and pain  · Nephrology following, appreciate input  · Sodium level improving today to 132    MANNY (obstructive sleep apnea)  Assessment & Plan  · Continue CPAP, patient reported he is not compliant  · Importance of compliance with regimen    Stage 3b chronic kidney disease ([facility] [address] )  Assessment & Plan  · Baseline appears to be 0 9-1 1  · Stable  · Avoid nephrotoxins; hypotension  · Nephrology on board  · Monitor BMP    Type 2 diabetes mellitus with diabetic peripheral angiopathy without gangrene [facility] )  Assessment & Plan  Chronic, subpar control, as evidenced by a hemoglobin A1C of 7 9  Home regimen includes: 45 units Lantus at bedtime with Apidra 30 units TID with meals  Continue  insulin sliding scale in addition to 25 units Lantus at bedtime and 15 units humalog with meals  Monitor blood glucose AC/HS  Hypoglycemia protocol    Acute blood loss anemia  Assessment & Plan  · Patient with a hemoglobin 11 4 with acute drop to 8 after surgery  · Hemoglobin remains stable, 8  · Transfuse for <7  · Iron panel collected; noted to have iron deficiency anemia - receiving IV Venofer    PAD (peripheral artery disease) ([facility])  Assessment & Plan  · On aspirin, Plavix  · Continue if cleared by Orthopedics    Hyperlipidemia  Assessment & Plan  · Continue home Lipitor    Hypertension, essential  Assessment & Plan  /75   Pulse 85   Temp 98 1 °F (36 7 °C) "(Oral)   Resp 18   Ht 6' 4\" (1 93 m)   Wt 122 kg (268 lb 15 4 oz)   SpO2 91%   BMI 32 74 kg/m²   · Stable pressures  Continue home meds except HCTZ  · Monitor BP             VTE Pharmacologic Prophylaxis:   lovenox    Patient Centered Rounds: I performed bedside rounds with nursing staff today  Discussions with Specialists or Other Care Team Provider: yes nephrology    Education and Discussions with Family / Patient: patient was updated today  Total Time Spent on Date of Encounter in care of patient: 25 minutes This time was spent on one or more of the following: performing physical exam; counseling and coordination of care; obtaining or reviewing history; documenting in the medical record; reviewing/ordering tests, medications or procedures; communicating with other healthcare professionals and discussing with patient's family/caregivers  Current Length of Stay: 3 day(s)  Current Patient Status: Inpatient   Certification Statement: The patient will continue to require additional inpatient hospital stay due to nephrology clearance pending  Discharge Plan: Anticipate discharge tomorrow to rehab facility  Code Status: Level 1 - Full Code    Subjective:   Seen and examined at bedside  Continues to have significant pain at the surgical site but doesn't want narcotics at discharge  Sodium is improving  Tolerating venofer    Objective:     Vitals:   Temp (24hrs), Av 9 °F (37 2 °C), Min:98 1 °F (36 7 °C), Max:99 6 °F (37 6 °C)    Temp:  [98 1 °F (36 7 °C)-99 6 °F (37 6 °C)] 98 1 °F (36 7 °C)  HR:  [85] 85  Resp:  [18] 18  BP: ()/(59-83) 147/75  SpO2:  [91 %] 91 %  Body mass index is 32 74 kg/m²  Input and Output Summary (last 24 hours):      Intake/Output Summary (Last 24 hours) at 2023 1239  Last data filed at 2023 0758  Gross per 24 hour   Intake 360 ml   Output 975 ml   Net -615 ml       Physical Exam:   Physical Exam S1,2 (+)     Additional Data:     Labs:  Results from last 7 days " Lab Units 05/04/23  0813   WBC Thousand/uL 16 19*   HEMOGLOBIN g/dL 8 0*   HEMATOCRIT % 24 4*   PLATELETS Thousands/uL 263   NEUTROS PCT % 82*   LYMPHS PCT % 7*   MONOS PCT % 10   EOS PCT % 0     Results from last 7 days   Lab Units 05/04/23  0813   SODIUM mmol/L 132*   POTASSIUM mmol/L 3 6   CHLORIDE mmol/L 100   CO2 mmol/L 24   BUN mg/dL 19   CREATININE mg/dL 0 75   ANION GAP mmol/L 8   CALCIUM mg/dL 8 5   GLUCOSE RANDOM mg/dL 174*         Results from last 7 days   Lab Units 05/04/23  1104 05/04/23  0726 05/03/23  2100 05/03/23  1612 05/03/23  1125 05/03/23  0715 05/02/23  2010 05/02/23  1602 05/02/23  1106 05/02/23  0705 05/01/23  2119 05/01/23  1853   POC GLUCOSE mg/dl 194* 161* 141* 142* 190* 185* 170* 158* 164* 181* 109 115               Lines/Drains:  Invasive Devices     Peripheral Intravenous Line  Duration           Peripheral IV 05/01/23 Distal;Dorsal (posterior); Right Forearm 3 days    Peripheral IV 05/01/23 Left;Dorsal (posterior) Hand 3 days                      Imaging: No pertinent imaging reviewed      Recent Cultures (last 7 days):   Results from last 7 days   Lab Units 05/01/23  1358   GRAM STAIN RESULT  No Polys or Bacteria seen       Last 24 Hours Medication List:   Current Facility-Administered Medications   Medication Dose Route Frequency Provider Last Rate   • acetaminophen  650 mg Oral Q8H PRN Alejandro Andino PA-C     • aspirin  81 mg Oral Daily Alejandro Andino PA-C     • atorvastatin  40 mg Oral QAM Alejandro Andino PA-C     • clopidogrel  75 mg Oral QAM Aaron Wright PA-C     • docusate sodium  100 mg Oral BID Alejandro Andino PA-C     • enoxaparin  40 mg Subcutaneous Daily Alejandro Andino PA-C     • escitalopram  10 mg Oral Daily Alejandro Andino PA-C     • HYDROmorphone  2 mg Oral Q6H PRN Lenka Katz PA-C     • HYDROmorphone  4 mg Oral Q6H PRN Lenka Katz PA-C     • HYDROmorphone  0 2 mg Intravenous Q3H PRN Ruth Ann Hargrove PA-C     • insulin glargine  25 Units Subcutaneous HS Tee Gilmore MD     • insulin lispro  15 Units Subcutaneous TID With Meals [facility] Carin Waller MD     • insulin lispro  2-12 Units Subcutaneous 4x Daily (AC & HS) Tee Gilmore MD     • iron sucrose  200 mg Intravenous Daily Paralee Bence,  mg (05/04/23 0919)   • levothyroxine  150 mcg Oral QAM Aaron Wright PA-C     • losartan  50 mg Oral Daily NAILA Steele     • pantoprazole  40 mg Oral Early Morning Sita Hernandez PA-C     • tamsulosin  0 4 mg Oral Daily With Je Wright PA-C     • verapamil  360 mg Oral HS Sita Hernandez PA-C     • zolpidem  10 mg Oral HS PRN Sita Hernandez PA-C          Today, Patient Was Seen By: Paralee Bence, MD    **Please Note: This note may have been constructed using a voice recognition system  **

## 2023-05-04 NOTE — PLAN OF CARE
Problem: PHYSICAL THERAPY ADULT  Goal: Performs mobility at highest level of function for planned discharge setting  See evaluation for individualized goals  Description: Treatment/Interventions: LE strengthening/ROM, Therapeutic exercise, Endurance training, Patient/family training, Equipment eval/education, Bed mobility, Continued evaluation, Spoke to nursing, OT  Equipment Recommended:  (TBD)       See flowsheet documentation for full assessment, interventions and recommendations  Outcome: Progressing  Note: Prognosis: Fair  Problem List: Decreased strength, Decreased range of motion, Decreased endurance, Impaired balance, Decreased mobility, Orthopedic restrictions, Pain, Impaired sensation  Assessment: Pt seen for PT treatment session this date, consisting of ther act focused on bed mobility, supine to sit transfer, sitting balance/posture facilitation for 6min duration, seated LB exercises while at EOB  Since previous session, pt has made good progress in terms of decreased A for rolling, initiation of sitting at EOB  Pt limited by pain and lightheadedness; BP taken (see above)  Pertinent barriers during this session include pain  Current goals and POC remain appropriate, pt continues to have rehab potential and is making fair progress towards STGs  Pt prognosis for achieving goals is fair, pending pt progress with hospitalization/medical status improvements, and indicated by [facility], ability to follow directions and self-expression (thoughts, feelings, needs)  Pt limited d/t the presence of intractable pain, fear of pain provocation and self limiting  PT recommends post acute rehabilitation services upon discharge  Pt continues to be functioning below baseline level, and remains limited 2* factors listed above  PT will continue to see pt during current hospitalization in order to address the deficits listed above and provide interventions consistent w/ POC in effort to achieve STGs    Barriers to Discharge: Inaccessible home environment, Decreased caregiver support     PT Discharge Recommendation: Post acute rehabilitation services    See flowsheet documentation for full assessment

## 2023-05-04 NOTE — PLAN OF CARE
Problem: OCCUPATIONAL THERAPY ADULT  Goal: Performs self-care activities at highest level of function for planned discharge setting  See evaluation for individualized goals  Description: Treatment Interventions: ADL retraining, Functional transfer training, Endurance training, Patient/family training          See flowsheet documentation for full assessment, interventions and recommendations  Outcome: Progressing  Note: Limitation: Decreased ADL status, Decreased UE strength, Decreased endurance, Decreased self-care trans, Decreased high-level ADLs  Prognosis: Good  Assessment: Patient participated in Skilled OT session this date with interventions consisting of ADL re training with the use of correct body mechnaics,  therapeutic activities to: increase activity tolerance and increase dynamic sit/ stand balance during functional activity    Patient agreeable to OT treatment session, upon arrival patient was found supine in bed and in no apparent distress  Patient completed rolling in bed with min assist of 1 and supine<>sit with min assist of 2  Pt sat EOB ~6 minutes to complete ADLs  Pt required sup-min assist for UB ADLs and max assist for LB ADLs  In comparison to previous session, patient with improvements in progression sitting at EOB and sitting tolerance  Patient requiring verbal cues for correct technique and frequent rest periods  Patient continues to be functioning below baseline level, occupational performance remains limited secondary to factors listed above and increased risk for falls and injury  From OT standpoint, recommendation at time of d/c would be Post acute rehabilitation services  Patient to benefit from continued Occupational Therapy treatment while in the hospital to address deficits as defined above and maximize level of functional independence with ADLs and functional mobility       OT Discharge Recommendation: Post acute rehabilitation services     [facility], OTR/L

## 2023-05-04 NOTE — OCCUPATIONAL THERAPY NOTE
Occupational Therapy Treatment Note     Patient Name: Tanja Sandifer  RGPLS'M Date: 5/4/2023  Problem List  Principal Problem:    Closed fracture of medial plateau of right tibia  Active Problems:    Hypertension, essential    Hyperlipidemia    Hyponatremia    PAD (peripheral artery disease) ([facility])    Acute blood loss anemia    Type 2 diabetes mellitus with diabetic peripheral angiopathy without gangrene ([facility])    Stage 3b chronic kidney disease ([facility])    MANNY (obstructive sleep apnea)          05/04/23 0931   OT Last Visit   OT Visit Date 05/04/23   Note Type   Note Type Treatment   Pain Assessment   Pain Assessment Tool 0-10   Pain Score 9   Pain Location/Orientation Orientation: Right;Location: Knee   Pain Onset/Description Onset: Ongoing;Frequency: Constant/Continuous   Hospital Pain Intervention(s) Ambulation/increased activity;Repositioned;Medication (See MAR)   Restrictions/Precautions   Weight Bearing Precautions Per Order Yes   RLE Weight Bearing Per Order NWB   Braces or Orthoses Knee immobilizer  (donned PTA)   Other Precautions Bed Alarm; Fall Risk;Pain;WBS   Lifestyle   Autonomy Patient reporting being independent with ADLs, ambulatory with wheelchair and lives with wife in a one level house   Reciprocal Relationships Wife and friend   Service to Others On disability   ADL   Where Assessed Edge of bed   UB Bathing Assistance 5  Supervision/Setup   UB Bathing Deficit Setup;Verbal cueing;Supervision/safety; Increased time to complete   LB Bathing Assistance 2  Maximal Assistance   LB Bathing Deficit Setup;Steadying;Verbal cueing;Supervision/safety; Increased time to complete; Buttocks; Left lower leg including foot;Right lower leg including foot   UB Dressing Assistance 4  Minimal Assistance   UB Dressing Deficit Setup;Verbal cueing;Supervision/safety; Increased time to complete;Pull around back; Fasteners   UB Dressing Comments Pt doffed/donned overhead T-shirt   Bed Mobility   Rolling R 4  Minimal assistance Additional items Assist x 1;HOB elevated; Bedrails; Increased time required;Verbal cues;LE management   Rolling L 4  Minimal assistance   Additional items Assist x 1;HOB elevated; Bedrails; Increased time required;Verbal cues;LE management   Supine to Sit 4  Minimal assistance   Additional items Assist x 2;HOB elevated; Bedrails; Increased time required;Verbal cues;LE management   Sit to Supine 4  Minimal assistance   Additional items Assist x 2; Increased time required;Verbal cues;LE management   Additional Comments Pt reported (+) dizziness while sitting at EOB  BP supine in bed: 152/83  BP seated at EOB: 102/59, BP seated at EOB post-ADLs: 98/59  BP returned to 147/75 once supine in bed  Transfers   Sit to Stand Unable to assess  (d/t pain and lightheadedness)   Cognition   Overall Cognitive Status [facility]   Arousal/Participation Alert; Responsive; Cooperative   Attention Within functional limits   Orientation Level Oriented X4   Memory Within functional limits   Following Commands Follows all commands and directions without difficulty   Comments Pt agreeable to OT session   Activity Tolerance   Activity Tolerance Patient limited by pain;Treatment limited secondary to medical complications (Comment)  (Dizziness)   Assessment   Assessment Patient participated in Skilled OT session this date with interventions consisting of ADL re training with the use of correct body mechnaics,  therapeutic activities to: increase activity tolerance and increase dynamic sit/ stand balance during functional activity    Patient agreeable to OT treatment session, upon arrival patient was found supine in bed and in no apparent distress  Patient completed rolling in bed with min assist of 1 and supine<>sit with min assist of 2  Pt sat EOB ~6 minutes to complete ADLs  Pt required sup-min assist for UB ADLs and max assist for LB ADLs  In comparison to previous session, patient with improvements in progression sitting at EOB and sitting tolerance  Patient requiring verbal cues for correct technique and frequent rest periods  Patient continues to be functioning below baseline level, occupational performance remains limited secondary to factors listed above and increased risk for falls and injury  From OT standpoint, recommendation at time of d/c would be Post acute rehabilitation services  Patient to benefit from continued Occupational Therapy treatment while in the hospital to address deficits as defined above and maximize level of functional independence with ADLs and functional mobility  Plan   Treatment Interventions ADL retraining;Functional transfer training; Endurance training;Patient/family training   Goal Expiration Date 05/17/23   OT Treatment Day 1   OT Frequency 3-5x/wk   Recommendation   OT Discharge Recommendation Post acute rehabilitation services   Additional Comments  The patient's raw score on the AM-PAC Daily Activity Inpatient Short Form is 15  A raw score of less than 19 suggests the patient may benefit from discharge to post-acute rehabilitation services  Please refer to the recommendation of the Occupational Therapist for safe discharge planning  AM-PAC Daily Activity Inpatient   Lower Body Dressing 1   Bathing 2   Toileting 1   Upper Body Dressing 3   Grooming 4   Eating 4   Daily Activity Raw Score 15   Daily Activity Standardized Score (Calc for Raw Score >=11) 34 69   AM-[facility] Applied Cognition Inpatient   Following a Speech/Presentation 4   Understanding Ordinary Conversation 4   Taking Medications 3   Remembering Where Things Are Placed or Put Away 3   Remembering List of 4-5 Errands 3   Taking Care of Complicated Tasks 3   Applied Cognition Raw Score 20   Applied Cognition Standardized Score 41 76   Modified [place] Scale   Modified [place] Scale 5   End of Consult   Patient Position at End of Consult Supine;Bed/Chair alarm activated; All needs within reach   Nurse Communication Nurse aware of consult     Og Riddle OTD, OTR/L

## 2023-05-04 NOTE — PLAN OF CARE
Problem: PAIN - ADULT  Goal: Verbalizes/displays adequate comfort level or baseline comfort level  Description: Interventions:  - Encourage patient to monitor pain and request assistance  - Assess pain using appropriate pain scale  - Administer analgesics based on type and severity of pain and evaluate response  - Implement non-pharmacological measures as appropriate and evaluate response  - Consider cultural and social influences on pain and pain management  - Notify physician/advanced practitioner if interventions unsuccessful or patient reports new pain  Outcome: Progressing     Problem: INFECTION - ADULT  Goal: Absence or prevention of progression during hospitalization  Description: INTERVENTIONS:  - Assess and monitor for signs and symptoms of infection  - Monitor lab/diagnostic results  - Monitor all insertion sites, i e  indwelling lines, tubes, and drains  - Monitor endotracheal if appropriate and nasal secretions for changes in amount and color  - [place] appropriate cooling/warming therapies per order  - Administer medications as ordered  - Instruct and encourage patient and family to use good hand hygiene technique  - Identify and instruct in appropriate isolation precautions for identified infection/condition  Outcome: Progressing  Goal: Absence of fever/infection during neutropenic period  Description: INTERVENTIONS:  - Monitor WBC    Outcome: Progressing     Problem: SAFETY ADULT  Goal: Patient will remain free of falls  Description: INTERVENTIONS:  - Educate patient/family on patient safety including physical limitations  - Instruct patient to call for assistance with activity   - Consult OT/PT to assist with strengthening/mobility   - Keep Call bell within reach  - Keep bed low and locked with side rails adjusted as appropriate  - Keep care items and personal belongings within reach  - Initiate and maintain comfort rounds  - Make Fall Risk Sign visible to staff  - Offer Toileting every 2 Hours, in advance of need  - Initiate/Maintain bed alarm  - Obtain necessary fall risk management equipment:   - Apply yellow socks and bracelet for high fall risk patients  - Consider moving patient to room near nurses station  Outcome: Progressing  Goal: Maintain or return to baseline ADL function  Description: INTERVENTIONS:  -  Assess patient's ability to carry out ADLs; assess patient's baseline for ADL function and identify physical deficits which impact ability to perform ADLs (bathing, care of mouth/teeth, toileting, grooming, dressing, etc )  - Assess/evaluate cause of self-care deficits   - Assess range of motion  - Assess patient's mobility; develop plan if impaired  - Assess patient's need for assistive devices and provide as appropriate  - Encourage maximum independence but intervene and supervise when necessary  - Involve family in performance of ADLs  - Assess for home care needs following discharge   - Consider OT consult to assist with ADL evaluation and planning for discharge  - Provide patient education as appropriate  Outcome: Progressing  Goal: Maintains/Returns to pre admission functional level  Description: INTERVENTIONS:  - Perform BMAT or MOVE assessment daily    - Set and communicate daily mobility goal to care team and patient/family/caregiver  - Collaborate with rehabilitation services on mobility goals if consulted  - Perform Range of Motion 3 times a day  - Reposition patient every 2 hours    - Dangle patient 3 times a day  - Stand patient 3 times a day  - Ambulate patient 3 times a day  - Out of bed to chair 3 times a day   - Out of bed for meals 3 times a day  - Out of bed for toileting  - Record patient progress and toleration of activity level   Outcome: Progressing     Problem: DISCHARGE PLANNING  Goal: Discharge to home or other facility with appropriate resources  Description: INTERVENTIONS:  - Identify barriers to discharge w/patient and caregiver  - Arrange for needed discharge resources and transportation as appropriate  - Identify discharge learning needs (meds, wound care, etc )  - Arrange for interpretive services to assist at discharge as needed  - Refer to Case Management Department for coordinating discharge planning if the patient needs post-hospital services based on physician/advanced practitioner order or complex needs related to functional status, cognitive ability, or social support system  Outcome: Progressing     Problem: Knowledge Deficit  Goal: Patient/family/caregiver demonstrates understanding of disease process, treatment plan, medications, and discharge instructions  Description: Complete learning assessment and assess knowledge base    Interventions:  - Provide teaching at level of understanding  - Provide teaching via preferred learning methods  Outcome: Progressing     Problem: SKIN/TISSUE INTEGRITY - ADULT  Goal: Skin Integrity remains intact(Skin Breakdown Prevention)  Description: Assess:  -Perform Fabrice assessment every shift  -Clean and moisturize skin every shift  -Inspect skin when repositioning, toileting, and assisting with ADLS  -Assess under medical devices   -Assess extremities for adequate circulation and sensation     Bed Management:  -Have minimal linens on bed & keep smooth, unwrinkled  -Change linens as needed when moist or perspiring  -Avoid sitting or lying in one position for more than 2 hours while in bed  -Keep HOB at 30 degrees     Toileting:  -Offer bedside commode  -Assess for incontinence every 2 hrs  -Use incontinent care products after each incontinent episode     Activity:  -Mobilize patient 3 times a day  -Encourage activity and walks on unit  -Encourage or provide ROM exercises   -Turn and reposition patient every 2 Hours  -Use appropriate equipment to lift or move patient in bed  -Instruct/ Assist with weight shifting every 2 hrs  when out of bed in chair  -Consider limitation of chair time 2 hour intervals    Skin Care:  -Avoid use of baby powder, tape, friction and shearing, hot water or constrictive clothing  -Relieve pressure over bony prominences   -Do not massage red bony areas    Next Steps:  -Teach patient strategies to minimize risks    -Consider consults to  interdisciplinary teams   Outcome: Progressing  Goal: Incision(s), wounds(s) or drain site(s) healing without S/S of infection  Description: INTERVENTIONS  - Assess and document dressing, incision, wound bed, drain sites and surrounding tissue  - Provide patient and family education  - Perform skin care/dressing changes every 2 hrs  Outcome: Progressing  Goal: Pressure injury heals and does not worsen  Description: Interventions:  - Implement low air loss mattress or specialty surface (Criteria met)  - Apply silicone foam dressing  - Instruct/assist with weight shifting every 120 minutes when in chair   - Limit chair time to 2 hour intervals  - Use special pressure reducing interventions when in chair   - Apply fecal or urinary incontinence containment device   - Perform passive or active ROM  - Turn and reposition patient & offload bony prominences every 2 hours   - Utilize friction reducing device or surface for transfers   - Consider consults to  interdisciplinary teams   - Use incontinent care products after each incontinent episode   - Consider nutrition services referral as needed  Outcome: Progressing     Problem: Prexisting or High Potential for Compromised Skin Integrity  Goal: Skin integrity is maintained or improved  Description: INTERVENTIONS:  - Identify patients at risk for skin breakdown  - Assess and monitor skin integrity  - Assess and monitor nutrition and hydration status  - Monitor labs   - Assess for incontinence   - Turn and reposition patient  - Assist with mobility/ambulation  - Relieve pressure over bony prominences  - Avoid friction and shearing  - Provide appropriate hygiene as needed including keeping skin clean and dry  - Evaluate need for skin moisturizer/barrier cream  - Collaborate with interdisciplinary team   - Patient/family teaching  - Consider wound care consult   Outcome: Progressing     Problem: MOBILITY - ADULT  Goal: Maintain or return to baseline ADL function  Description: INTERVENTIONS:  -  Assess patient's ability to carry out ADLs; assess patient's baseline for ADL function and identify physical deficits which impact ability to perform ADLs (bathing, care of mouth/teeth, toileting, grooming, dressing, etc )  - Assess/evaluate cause of self-care deficits   - Assess range of motion  - Assess patient's mobility; develop plan if impaired  - Assess patient's need for assistive devices and provide as appropriate  - Encourage maximum independence but intervene and supervise when necessary  - Involve family in performance of ADLs  - Assess for home care needs following discharge   - Consider OT consult to assist with ADL evaluation and planning for discharge  - Provide patient education as appropriate  Outcome: Progressing  Goal: Maintains/Returns to pre admission functional level  Description: INTERVENTIONS:  - Perform BMAT or MOVE assessment daily    - Set and communicate daily mobility goal to care team and patient/family/caregiver  - Collaborate with rehabilitation services on mobility goals if consulted  - Perform Range of Motion 3 times a day  - Reposition patient every 2 hours    - Dangle patient 3 times a day  - Stand patient 3 times a day  - Ambulate patient 3 times a day  - Out of bed to chair 3 times a day   - Out of bed for meals 3 times a day  - Out of bed for toileting  - Record patient progress and toleration of activity level   Outcome: Progressing

## 2023-05-04 NOTE — PROGRESS NOTES
NEPHROLOGY PROGRESS NOTE    Patient: Tanja Sandifer               Sex: male          DOA: 5/1/2023  9:52 AM   YOB: 1965        Age:  59 y o         LOS:  LOS: 3 days   5/4/2023    REASON FOR THE CONSULTATION: Further management of CKD    SUBJECTIVE     Seen and examined at the bedside today  Reports mild nausea today following administration of narcotic pain medications  He denies shortness of breath  Reviewed past 24 hour events      CURRENT MEDICATIONS       Current Facility-Administered Medications:   •  acetaminophen (TYLENOL) tablet 650 mg, 650 mg, Oral, Q8H PRN, Marlen Paul PA-C  •  aspirin chewable tablet 81 mg, 81 mg, Oral, Daily, Marlen Paul PA-C, 81 mg at 05/04/23 0931  •  atorvastatin (LIPITOR) tablet 40 mg, 40 mg, Oral, QAM, Aaron Wright PA-C, 40 mg at 05/04/23 4191  •  clopidogrel (PLAVIX) tablet 75 mg, 75 mg, Oral, QAM, Aaron Wright PA-C, 75 mg at 05/04/23 0931  •  docusate sodium (COLACE) capsule 100 mg, 100 mg, Oral, BID, Aaron Wright PA-C, 100 mg at 05/04/23 0931  •  enoxaparin (LOVENOX) subcutaneous injection 40 mg, 40 mg, Subcutaneous, Daily, Marlen Paul PA-C, 40 mg at 05/03/23 9599  •  escitalopram (LEXAPRO) tablet 10 mg, 10 mg, Oral, Daily, Aaron Wright PA-C, 10 mg at 05/04/23 1194  •  HYDROmorphone (DILAUDID) tablet 2 mg, 2 mg, Oral, Q6H PRN, Oni Brown PA-C  •  HYDROmorphone (DILAUDID) tablet 4 mg, 4 mg, Oral, Q6H PRN, Oni Brown PA-C, 4 mg at 05/03/23 2055  •  HYDROmorphone HCl (DILAUDID) injection 0 2 mg, 0 2 mg, Intravenous, Q3H PRN, Oni Brown PA-C, 0 2 mg at 05/02/23 0405  •  insulin glargine (LANTUS) subcutaneous injection 25 Units 0 25 mL, 25 Units, Subcutaneous, HS, Jimbo Dhillon MD, 25 Units at 05/03/23 3390  •  insulin lispro (HumaLOG) 100 units/mL subcutaneous injection 15 Units, 15 Units, Subcutaneous, TID With Meals, Jimbo Dhillon MD, 15 Units at 05/03/23 3760  • insulin lispro (HumaLOG) 100 units/mL subcutaneous injection 2-12 Units, 2-12 Units, Subcutaneous, 4x Daily (AC & HS), 2 Units at 05/04/23 0933 **AND** Fingerstick Glucose (POCT), , , 4x Daily AC and at bedtime, Daphnie Vieyra MD  •  iron sucrose (VENOFER) 200 mg in sodium chloride 0 9 % 100 mL IVPB, 200 mg, Intravenous, Daily, Aggie Smith MD, Last Rate: 100 mL/hr at 05/04/23 0933, 200 mg at 05/04/23 8697  •  levothyroxine tablet 150 mcg, 150 mcg, Oral, QAM, Aaron Wright PA-C, 150 mcg at 05/04/23 0758  •  losartan (COZAAR) tablet 50 mg, 50 mg, Oral, Daily, 50 mg at 05/03/23 0914 **AND** [DISCONTINUED] hydrochlorothiazide (HYDRODIURIL) tablet 12 5 mg, 12 5 mg, Oral, Daily, [facility] NAILA [facility]  •  pantoprazole (PROTONIX) EC tablet 40 mg, 40 mg, Oral, Early Morning, Aaron Wright PA-C, 40 mg at 05/03/23 3622  •  tamsulosin (FLOMAX) capsule 0 4 mg, 0 4 mg, Oral, Daily With Dinner, Yovany Guaman PA-C, 0 4 mg at 05/03/23 1739  •  verapamil (CALAN-SR) CR tablet 360 mg, 360 mg, Oral, HS, Aaron Wright PA-C  •  zolpidem (AMBIEN) tablet 10 mg, 10 mg, Oral, HS PRN, Yovany Guaman PA-C    REVIEW OF SYSTEMS     Review of Systems   Constitutional: Negative for activity change, chills, fatigue and fever  HENT: Negative for hearing loss, nosebleeds and trouble swallowing  Respiratory: Negative for cough and shortness of breath  Cardiovascular: Negative for chest pain and leg swelling  Gastrointestinal: Positive for nausea  Negative for constipation, diarrhea and vomiting  Genitourinary: Negative for difficulty urinating, dysuria, frequency and hematuria  Musculoskeletal: Negative for back pain  RLE pain   Skin: Negative for pallor  Neurological: Negative for dizziness, syncope, weakness and light-headedness  Psychiatric/Behavioral: Negative for sleep disturbance  The patient is not nervous/anxious          OBJECTIVE     Current Weight: Weight - Scale: 122 kg (268 lb 15 4 oz)  Vitals:    05/04/23 0930   BP: 147/75   Pulse:    Resp:    Temp:    SpO2:      Body mass index is 32 74 kg/m²  Intake/Output Summary (Last 24 hours) at 5/4/2023 1156  Last data filed at 5/4/2023 0758  Gross per 24 hour   Intake 360 ml   Output 975 ml   Net -615 ml       PHYSICAL EXAMINATION     Physical Exam  Vitals reviewed  Constitutional:       General: He is not in acute distress  HENT:      Head: Normocephalic  Mouth/Throat:      Lips: Pink  Mouth: Mucous membranes are moist    Eyes:      General: Lids are normal  No scleral icterus  Cardiovascular:      Rate and Rhythm: Normal rate and regular rhythm  Heart sounds: S1 normal and S2 normal  No murmur heard  Pulmonary:      Effort: Pulmonary effort is normal  No accessory muscle usage or respiratory distress  Breath sounds: Normal breath sounds  Abdominal:      General: There is no distension  Tenderness: There is no abdominal tenderness  Musculoskeletal:      Cervical back: Normal range of motion and neck supple  No tenderness  Comments: Ace wrap dressing and immobilizer to right lower extremity   Skin:     General: Skin is warm  Coloration: Skin is not cyanotic or jaundiced  Neurological:      General: No focal deficit present  Mental Status: He is alert and oriented to person, place, and time  Psychiatric:         Attention and Perception: Attention normal          Speech: Speech normal          Behavior: Behavior is cooperative             LAB RESULTS     Results from last 7 days   Lab Units 05/04/23  0813 05/03/23 2038 05/03/23  0459 05/02/23  0502 05/01/23  1703   WBC Thousand/uL 16 19*  --  19 42* 13 40*  --    HEMOGLOBIN g/dL 8 0* 8 0* 8 1* 9 1*  --    I STAT HEMOGLOBIN g/dl  --   --   --   --  10 2*   HEMATOCRIT % 24 4* 24 4* 25 0* 27 9*  --    HEMATOCRIT, ISTAT %  --   --   --   --  30*   PLATELETS Thousands/uL 263  --  260 288  --    SODIUM mmol/L 132*  --  129* 134*  -- POTASSIUM mmol/L 3 6  --  3 9 4 0  --    CHLORIDE mmol/L 100  --  99 102  --    CO2 mmol/L 24  --  24 22  --    CO2, I-STAT mmol/L  --   --   --   --  25   BUN mg/dL 19  --  22 20  --    CREATININE mg/dL 0 75  --  0 90 1 04  --    EGFR ml/min/1 73sq m 99  --  92 77  --    CALCIUM mg/dL 8 5  --  8 3* 8 8  --    GLUCOSE, ISTAT mg/dl  --   --   --   --  109       RADIOLOGY RESULTS      No results found for this or any previous visit  Results for orders placed during the hospital encounter of 03/03/23    XR chest 2 views    Narrative  CHEST    INDICATION:   diminished L lung sounds  COMPARISON:  11/15/2018    EXAM PERFORMED/VIEWS:  XR CHEST PA & LATERAL      FINDINGS:    Marked patient rotation  Cardiomediastinal silhouette appears unremarkable  The lungs are clear  No pneumothorax or pleural effusion  Osseous structures appear within normal limits for patient age  Impression  No acute cardiopulmonary disease  Workstation performed: [id number]      ASSESSMENT/PLAN     58-year-old male with a past medical history of CKD stage III, hypertension, type 2 diabetes, peripheral arterial disease, and prior Goprelto fracture of the right tibia status post repair      Chronic kidney disease stage:  After review of the medical record, it appears that baseline creatinine fluctuates from 0 9-1 1   He did have prior episodes of acute kidney injury in the past, but was admitted for procedure with creatinine level within suspected baseline   Etiology of CKD likely secondary to longstanding history of hypertension and diabetes      Creatinine remained stable at 0 75 with an EGFR of 99    Recommend avoidance of nephrotoxic agents such as NSAIDs for pain management   Maintain MAP > 65 to optimize renal perfusion   Recommend monitoring daily BMP while hospitalized      Hypertension:  Blood pressure readings within acceptable range over the past 24 hours      Hyponatremia:  Sodium low at 129 on labs "yesterday  Urine osmolality 646, urine sodium 50, indicative of SIADH etiology  Suspect multifactorial in the setting of setting of pain, IVF, and use of HCTZ  Following discontinuation of HCTZ, sodium levels have improved to 132      Anemia:  Current hemoglobin is 8 0, continue to monitor  Recommend transfusion for hemoglobin < 7 0  Yun Sequeira [facility]   Nephrology  5/4/2023      Portions of the record may have been created with voice recognition software  Occasional wrong word or \"sound a like\" substitutions may have occurred due to the inherent limitations of voice recognition software  Read the chart carefully and recognize, using context, where substitutions have occurred    "

## 2023-05-04 NOTE — ASSESSMENT & PLAN NOTE
Chronic, subpar control, as evidenced by a hemoglobin A1C of 7 9  Home regimen includes: 45 units Lantus at bedtime with Apidra 30 units TID with meals  Continue  insulin sliding scale in addition to 25 units Lantus at bedtime and 15 units humalog with meals  Monitor blood glucose AC/HS  Hypoglycemia protocol

## 2023-05-04 NOTE — ASSESSMENT & PLAN NOTE
· Patient with a hemoglobin 11 4 with acute drop to 8 after surgery  · Hemoglobin remains stable, 8  · Transfuse for <7  · Iron panel collected; noted to have iron deficiency anemia - receiving IV Venofer

## 2023-05-04 NOTE — ASSESSMENT & PLAN NOTE
"/75   Pulse 85   Temp 98 1 °F (36 7 °C) (Oral)   Resp 18   Ht 6' 4\" (1 93 m)   Wt 122 kg (268 lb 15 4 oz)   SpO2 91%   BMI 32 74 kg/m²   · Stable pressures   Continue home meds except HCTZ  · Monitor BP  "

## 2023-05-05 LAB
ALBUMIN SERPL BCP-MCNC: 3 G/DL (ref 3.5–5)
ALP SERPL-CCNC: 95 U/L (ref 34–104)
ALT SERPL W P-5'-P-CCNC: 5 U/L (ref 7–52)
ANION GAP SERPL CALCULATED.3IONS-SCNC: 7 MMOL/L (ref 4–13)
AST SERPL W P-5'-P-CCNC: 9 U/L (ref 13–39)
BILIRUB SERPL-MCNC: 0.76 MG/DL (ref 0.2–1)
BUN SERPL-MCNC: 19 MG/DL (ref 5–25)
CALCIUM ALBUM COR SERPL-MCNC: 9.4 MG/DL (ref 8.3–10.1)
CALCIUM SERPL-MCNC: 8.6 MG/DL (ref 8.4–10.2)
CHLORIDE SERPL-SCNC: 100 MMOL/L (ref 96–108)
CO2 SERPL-SCNC: 25 MMOL/L (ref 21–32)
CREAT SERPL-MCNC: 0.75 MG/DL (ref 0.6–1.3)
ERYTHROCYTE [DISTWIDTH] IN BLOOD BY AUTOMATED COUNT: 12.8 % (ref 11.6–15.1)
GFR SERPL CREATININE-BSD FRML MDRD: 99 ML/MIN/1.73SQ M
GLUCOSE SERPL-MCNC: 154 MG/DL (ref 65–140)
GLUCOSE SERPL-MCNC: 154 MG/DL (ref 65–140)
GLUCOSE SERPL-MCNC: 210 MG/DL (ref 65–140)
GLUCOSE SERPL-MCNC: 223 MG/DL (ref 65–140)
GLUCOSE SERPL-MCNC: 95 MG/DL (ref 65–140)
HCT VFR BLD AUTO: 23.9 % (ref 36.5–49.3)
HCT VFR BLD AUTO: 24.3 % (ref 36.5–49.3)
HGB BLD-MCNC: 7.9 G/DL (ref 12–17)
HGB BLD-MCNC: 8 G/DL (ref 12–17)
MCH RBC QN AUTO: 30 PG (ref 26.8–34.3)
MCHC RBC AUTO-ENTMCNC: 33.1 G/DL (ref 31.4–37.4)
MCV RBC AUTO: 91 FL (ref 82–98)
PLATELET # BLD AUTO: 293 THOUSANDS/UL (ref 149–390)
PMV BLD AUTO: 10.6 FL (ref 8.9–12.7)
POTASSIUM SERPL-SCNC: 3.6 MMOL/L (ref 3.5–5.3)
PROT SERPL-MCNC: 6.5 G/DL (ref 6.4–8.4)
RBC # BLD AUTO: 2.63 MILLION/UL (ref 3.88–5.62)
SODIUM SERPL-SCNC: 132 MMOL/L (ref 135–147)
WBC # BLD AUTO: 13.73 THOUSAND/UL (ref 4.31–10.16)

## 2023-05-05 RX ORDER — INSULIN GLARGINE 100 [IU]/ML
30 INJECTION, SOLUTION SUBCUTANEOUS
Status: DISCONTINUED | OUTPATIENT
Start: 2023-05-05 | End: 2023-05-07

## 2023-05-05 RX ORDER — POTASSIUM CHLORIDE 20 MEQ/1
40 TABLET, EXTENDED RELEASE ORAL ONCE
Status: COMPLETED | OUTPATIENT
Start: 2023-05-05 | End: 2023-05-05

## 2023-05-05 RX ORDER — INSULIN LISPRO 100 [IU]/ML
18 INJECTION, SOLUTION INTRAVENOUS; SUBCUTANEOUS
Status: DISCONTINUED | OUTPATIENT
Start: 2023-05-05 | End: 2023-05-07

## 2023-05-05 RX ADMIN — ESCITALOPRAM OXALATE 10 MG: 10 TABLET, FILM COATED ORAL at 10:57

## 2023-05-05 RX ADMIN — PANTOPRAZOLE SODIUM 40 MG: 40 TABLET, DELAYED RELEASE ORAL at 05:55

## 2023-05-05 RX ADMIN — ZOLPIDEM TARTRATE 10 MG: 5 TABLET, COATED ORAL at 21:47

## 2023-05-05 RX ADMIN — INSULIN LISPRO 2 UNITS: 100 INJECTION, SOLUTION INTRAVENOUS; SUBCUTANEOUS at 08:00

## 2023-05-05 RX ADMIN — VERAPAMIL HYDROCHLORIDE 360 MG: 180 TABLET ORAL at 21:48

## 2023-05-05 RX ADMIN — SODIUM CHLORIDE 200 MG: 900 INJECTION, SOLUTION INTRAVENOUS at 10:37

## 2023-05-05 RX ADMIN — TAMSULOSIN HYDROCHLORIDE 0.4 MG: 0.4 CAPSULE ORAL at 18:43

## 2023-05-05 RX ADMIN — ENOXAPARIN SODIUM 40 MG: 40 INJECTION SUBCUTANEOUS at 05:55

## 2023-05-05 RX ADMIN — HYDROMORPHONE HYDROCHLORIDE 4 MG: 4 TABLET ORAL at 13:29

## 2023-05-05 RX ADMIN — ASPIRIN 81 MG: 81 TABLET, CHEWABLE ORAL at 10:57

## 2023-05-05 RX ADMIN — TRIMETHOBENZAMIDE HYDROCHLORIDE 200 MG: 100 INJECTION INTRAMUSCULAR at 13:23

## 2023-05-05 RX ADMIN — INSULIN LISPRO 18 UNITS: 100 INJECTION, SOLUTION INTRAVENOUS; SUBCUTANEOUS at 16:47

## 2023-05-05 RX ADMIN — ATORVASTATIN CALCIUM 40 MG: 40 TABLET, FILM COATED ORAL at 10:57

## 2023-05-05 RX ADMIN — INSULIN LISPRO 15 UNITS: 100 INJECTION, SOLUTION INTRAVENOUS; SUBCUTANEOUS at 11:29

## 2023-05-05 RX ADMIN — INSULIN LISPRO 4 UNITS: 100 INJECTION, SOLUTION INTRAVENOUS; SUBCUTANEOUS at 16:46

## 2023-05-05 RX ADMIN — LEVOTHYROXINE SODIUM 150 MCG: 150 TABLET ORAL at 10:56

## 2023-05-05 RX ADMIN — INSULIN LISPRO 4 UNITS: 100 INJECTION, SOLUTION INTRAVENOUS; SUBCUTANEOUS at 11:29

## 2023-05-05 RX ADMIN — CLOPIDOGREL BISULFATE 75 MG: 75 TABLET ORAL at 10:56

## 2023-05-05 RX ADMIN — POTASSIUM CHLORIDE 40 MEQ: 1500 TABLET, EXTENDED RELEASE ORAL at 10:56

## 2023-05-05 RX ADMIN — INSULIN LISPRO 2 UNITS: 100 INJECTION, SOLUTION INTRAVENOUS; SUBCUTANEOUS at 21:48

## 2023-05-05 RX ADMIN — INSULIN GLARGINE 30 UNITS: 100 INJECTION, SOLUTION SUBCUTANEOUS at 21:48

## 2023-05-05 RX ADMIN — HYDROMORPHONE HYDROCHLORIDE 4 MG: 4 TABLET ORAL at 21:47

## 2023-05-05 RX ADMIN — DOCUSATE SODIUM 100 MG: 100 CAPSULE, LIQUID FILLED ORAL at 17:23

## 2023-05-05 RX ADMIN — LOSARTAN POTASSIUM 50 MG: 50 TABLET, FILM COATED ORAL at 10:56

## 2023-05-05 RX ADMIN — INSULIN LISPRO 15 UNITS: 100 INJECTION, SOLUTION INTRAVENOUS; SUBCUTANEOUS at 08:00

## 2023-05-05 RX ADMIN — DOCUSATE SODIUM 100 MG: 100 CAPSULE, LIQUID FILLED ORAL at 11:36

## 2023-05-05 NOTE — PROGRESS NOTES
"[facility]  Progress Note  Name: Michel Jones  MRN: [id number]  Unit/Bed#: -01 I Date of Admission: 5/1/2023   Date of Service: 5/5/2023 I Hospital Day: 4    Assessment/Plan   * Closed fracture of medial plateau of right tibia  Assessment & Plan  · S/p reconstruction of proximal tibia today however may require cement spacer as well as possible external fixator placement  · Pain management per primary team  Ortho primary  · Pain is uncontrolled as per the patient today  Patient does not want Percocet at discharge  He is willing to try 2 days of p o  Dilaudid at discharge  IV Dilaudid has been making him very nauseous and we have been giving him known QT prolonging antinausea agents like Tigan and Zofran when QTc has been less than 500    Hyponatremia  Assessment & Plan  · Improved to 132    Hydrochlorothiazide has been discontinued  · Multifactorial in setting of IVF/Hctz and pain  · Nephrology following, appreciate input    Type 2 diabetes mellitus with diabetic peripheral angiopathy without gangrene ([facility] [address] )  Assessment & Plan  Chronic, subpar control, as evidenced by a hemoglobin A1C of 7 9  Home regimen includes: 45 units Lantus at bedtime with Apidra 30 units TID with meals  Continue  insulin sliding scale in addition to 30 units Lantus at bedtime and increase mealtime insulin to 18-18-18  Monitor blood glucose AC/HS  Hypoglycemia protocol    Acute blood loss anemia  Assessment & Plan  · Patient with a hemoglobin 11 4 with acute drop to 8 after surgery  · Hemoglobin remains stable, 8  · Transfuse for <7  · Iron panel collected; noted to have iron deficiency anemia - receiving IV Venofer    PAD (peripheral artery disease) ([facility])  Assessment & Plan  · On aspirin, Plavix  · Continue if cleared by Orthopedics    Hypertension, essential  Assessment & Plan  /72   Pulse 85   Temp 98 6 °F (37 °C)   Resp 19   Ht 6' 4\" (1 93 m)   Wt 122 kg (268 lb 15 4 oz)   SpO2 91%   BMI " 32 74 kg/m²   · Stable pressures  Continue home meds except HCTZ  · Monitor BP         VTE Pharmacologic Prophylaxis:   Lovenox    Patient Centered Rounds: I performed bedside rounds with nursing staff today  Discussions with Specialists or Other Care Team Provider: Yes, nephrology    Education and Discussions with Family / Patient: Called patient's wife Cynthia Mazariegos at [phone], went to voicemail which was not set up  Called around 1:15 PM      Total Time Spent on Date of Encounter in care of patient: 25 minutes This time was spent on one or more of the following: performing physical exam; counseling and coordination of care; obtaining or reviewing history; documenting in the medical record; reviewing/ordering tests, medications or procedures; communicating with other healthcare professionals and discussing with patient's family/caregivers  Current Length of Stay: 4 day(s)  Current Patient Status: Inpatient   Certification Statement: Case management working on disposition planning  Discharge Plan: [facility] is following this patient on consult  They are medically stable for discharge when deemed appropriate by primary service  Code Status: Level 1 - Full Code    Subjective:   Seen and examined at bedside  Patient complains of severe pain  He does not want narcotic prescription at discharge because he feels like he has a very addictive nature  He will try 2 days of p o  Dilaudid at discharge    Objective:     Vitals:   Temp (24hrs), Av 7 °F (37 1 °C), Min:97 9 °F (36 6 °C), Max:99 2 °F (37 3 °C)    Temp:  [97 9 °F (36 6 °C)-99 2 °F (37 3 °C)] 98 6 °F (37 °C)  Resp:  [18-19] 19  BP: ()/(63-76) 130/72  Body mass index is 32 74 kg/m²  Input and Output Summary (last 24 hours):      Intake/Output Summary (Last 24 hours) at 2023 1317  Last data filed at 2023 1300  Gross per 24 hour   Intake 600 ml   Output 900 ml   Net -300 ml       Physical Exam:   Physical Exam S1-S2 audible, regular, lungs clear to auscultation bilaterally, no new focal neurodeficit  Alert and oriented to time place and person, right lower extremity in a brace, tenderness at the surgical site    Additional Data:     Labs:  Results from last 7 days   Lab Units 05/05/23  0922 05/04/23  1807 05/04/23  0813   WBC Thousand/uL  --   --  16 19*   HEMOGLOBIN g/dL 8 0*   < > 8 0*   HEMATOCRIT % 24 3*   < > 24 4*   PLATELETS Thousands/uL  --   --  263   NEUTROS PCT %  --   --  82*   LYMPHS PCT %  --   --  7*   MONOS PCT %  --   --  10   EOS PCT %  --   --  0    < > = values in this interval not displayed  Results from last 7 days   Lab Units 05/05/23  0922   SODIUM mmol/L 132*   POTASSIUM mmol/L 3 6   CHLORIDE mmol/L 100   CO2 mmol/L 25   BUN mg/dL 19   CREATININE mg/dL 0 75   ANION GAP mmol/L 7   CALCIUM mg/dL 8 6   ALBUMIN g/dL 3 0*   TOTAL BILIRUBIN mg/dL 0 76   ALK PHOS U/L 95   ALT U/L 5*   AST U/L 9*   GLUCOSE RANDOM mg/dL 210*         Results from last 7 days   Lab Units 05/05/23  1122 05/05/23  0721 05/04/23  2048 05/04/23  1617 05/04/23  1104 05/04/23  0726 05/03/23  2100 05/03/23  1612 05/03/23  1125 05/03/23  0715 05/02/23  2010 05/02/23  1602   POC GLUCOSE mg/dl 223* 154* 163* 169* 194* 161* 141* 142* 190* 185* 170* 158*               Lines/Drains:  Invasive Devices     Peripheral Intravenous Line  Duration           Peripheral IV 05/01/23 Distal;Dorsal (posterior); Right Forearm 4 days    Peripheral IV 05/01/23 Left;Dorsal (posterior) Hand 4 days                      Imaging: No pertinent imaging reviewed      Recent Cultures (last 7 days):   Results from last 7 days   Lab Units 05/01/23  1358   GRAM STAIN RESULT  No Polys or Bacteria seen       Last 24 Hours Medication List:   Current Facility-Administered Medications   Medication Dose Route Frequency Provider Last Rate   • acetaminophen  650 mg Oral Q8H PRN Javier Thrasher PA-C     • aspirin  81 mg Oral Daily Javier Beat, PA-C     • atorvastatin  40 mg Oral QAM Mercado Ricky GABRIEL Wright     • clopidogrel  75 mg Oral QAM Aaron Wright PA-C     • docusate sodium  100 mg Oral BID Day Russo PA-C     • enoxaparin  40 mg Subcutaneous Daily Day Russo PA-C     • escitalopram  10 mg Oral Daily Day Russo PA-C     • HYDROmorphone  2 mg Oral Q6H PRN Roxane Lentz PA-C     • HYDROmorphone  4 mg Oral Q6H PRN Roxane Lentz PA-C     • HYDROmorphone  0 2 mg Intravenous Q3H PRN Roxane Lentz PA-C     • insulin glargine  30 Units Subcutaneous HS Lillie Carver MD     • insulin lispro  18 Units Subcutaneous TID With Meals Lilile Carver MD     • insulin lispro  2-12 Units Subcutaneous 4x Daily (AC & HS) Beka Yanez MD     • iron sucrose  200 mg Intravenous Daily Lillei Carver  mg (05/04/23 0933)   • levothyroxine  150 mcg Oral QAM Aaron Wright PA-C     • losartan  50 mg Oral Daily [facility] NAILA Villa     • pantoprazole  40 mg Oral Early Morning Day Russo PA-C     • tamsulosin  0 4 mg Oral Daily With Je Wright PA-C     • trimethobenzamide  200 mg Intramuscular Q6H PRN NAILA Rivera     • verapamil  360 mg Oral HS Aaron Wright PA-C     • zolpidem  10 mg Oral HS PRN Day Rsuso PA-C          Today, Patient Was Seen By: Lillie Carver MD    **Please Note: This note may have been constructed using a voice recognition system  **

## 2023-05-05 NOTE — ASSESSMENT & PLAN NOTE
· Improved to 132    Hydrochlorothiazide has been discontinued  · Multifactorial in setting of IVF/Hctz and pain  · Nephrology following, appreciate input

## 2023-05-05 NOTE — ARC ADMISSION
Referral was received for consideration of patient for inpatient acute rehab at [facility]  After review with ARC physician patient is accepted for [facility]  Auth can be submitted through DCS  Our NPI is  and Dr Yola Watson NPI is   Bryce Nunes CM made aware through TT and AIDIN      Benefits checked: $0 copay, 10% co-insurance, $200 Deductible, OOP Max $2300 of which $311 51 remaining

## 2023-05-05 NOTE — CASE MANAGEMENT
Case Management Discharge Planning Note    Patient name Olga Montano  Location [address]/-01 MRN [id number]  : 1965 Date 2023       Current Admission Date: 2023  Current Admission Diagnosis:Closed fracture of medial plateau of right tibia   Patient Active Problem List    Diagnosis Date Noted   • MANNY (obstructive sleep apnea) 2023   • Closed fracture of medial plateau of right tibia 2023   • Depression and anxiety 2023   • Acquired genu varum of right lower extremity 2023   • MARKELL (acute kidney injury) ([facility] ) 2023   • Chronic kidney disease-mineral and bone disorder 2023   • Closed fracture of right tibial plateau    • Stage 3b chronic kidney disease ([facility] ) 11/15/2022   • Hyperkalemia 11/15/2022   • Alcoholism ([facility] ) 11/15/2022   • Dupuytren's contracture of right hand 2022   • Type 2 diabetes mellitus with diabetic peripheral angiopathy without gangrene ([facility] ) 2022   • Physical deconditioning 2021   • Acquired hypothyroidism 10/18/2021   • Acquired absence of right foot ([facility] ) 2021   • Persistent proteinuria 2021   • Abnormal EKG 2020   • Preoperative examination 2020   • Insomnia 2020   • Acute blood loss anemia 10/08/2020   • Hypomagnesemia 2020   • Urinary retention 2020   • Thyroid cancer ([facility] ) 2020   • PAD (peripheral artery disease) ([facility] ) 2020   • Hyponatremia 2020   • Health maintenance examination 2020   • Elevated liver enzymes 10/24/2019   • Colon polyps 2019   • Right-sided tinnitus 2018   • Hypertension, essential 2018   • Gastroesophageal reflux disease without esophagitis 2018   • Obesity, Class I, BMI 30 0-34 9 (see actual BMI) 2010   • Hyperlipidemia 2009   • History of nonadherence to medical treatment 2008      LOS (days): 4  Geometric Mean LOS (GMLOS) (days): 4 00  Days to GMLOS:0     OBJECTIVE:  Risk of Unplanned Readmission Score: 21 74         Current admission status: Inpatient   Preferred Pharmacy:   [id number], [address] [place] [id number]  Phone: [phone] Fax: [phone] [address] , [place] [address]  Phone: [phone] Fax: [phone]    Primary Care Provider: Glen Thakkar MD    Primary Insurance: BLUE CROSS  Secondary Insurance:     DISCHARGE DETAILS:    Discharge planning discussed with[de-identified] patient  Freedom of Choice: Yes  Comments - Freedom of Choice: Pt has been accepted at the [facility] pending insurance auth  Pt is agreeable to this dcp  Message sent to CM support to start the auth                               Other Referral/Resources/Interventions Provided:  Interventions: Acute Rehab  Referral Comments: [facility] [address] [facility] will accept pending insurance auth    Referral sent to CM support team   Pt is agreeable to this dcp    Would you like to participate in our [facility] service program?  : No - Declined    Treatment Team Recommendation: Acute Rehab  Discharge Destination Plan[de-identified] Acute Rehab  Transport at Discharge : BLS Ambulance

## 2023-05-05 NOTE — ASSESSMENT & PLAN NOTE
"/72   Pulse 85   Temp 98 6 °F (37 °C)   Resp 19   Ht 6' 4\" (1 93 m)   Wt 122 kg (268 lb 15 4 oz)   SpO2 91%   BMI 32 74 kg/m²   · Stable pressures   Continue home meds except HCTZ  · Monitor BP  "

## 2023-05-05 NOTE — PLAN OF CARE
Problem: PAIN - ADULT  Goal: Verbalizes/displays adequate comfort level or baseline comfort level  Description: Interventions:  - Encourage patient to monitor pain and request assistance  - Assess pain using appropriate pain scale  - Administer analgesics based on type and severity of pain and evaluate response  - Implement non-pharmacological measures as appropriate and evaluate response  - Consider cultural and social influences on pain and pain management  - Notify physician/advanced practitioner if interventions unsuccessful or patient reports new pain  Outcome: Progressing     Problem: INFECTION - ADULT  Goal: Absence or prevention of progression during hospitalization  Description: INTERVENTIONS:  - Assess and monitor for signs and symptoms of infection  - Monitor lab/diagnostic results  - Monitor all insertion sites, i e  indwelling lines, tubes, and drains  - Monitor endotracheal if appropriate and nasal secretions for changes in amount and color  - [place] appropriate cooling/warming therapies per order  - Administer medications as ordered  - Instruct and encourage patient and family to use good hand hygiene technique  - Identify and instruct in appropriate isolation precautions for identified infection/condition  Outcome: Progressing  Goal: Absence of fever/infection during neutropenic period  Description: INTERVENTIONS:  - Monitor WBC    Outcome: Progressing     Problem: SAFETY ADULT  Goal: Patient will remain free of falls  Description: INTERVENTIONS:  - Educate patient/family on patient safety including physical limitations  - Instruct patient to call for assistance with activity   - Consult OT/PT to assist with strengthening/mobility   - Keep Call bell within reach  - Keep bed low and locked with side rails adjusted as appropriate  - Keep care items and personal belongings within reach  - Initiate and maintain comfort rounds  - Make Fall Risk Sign visible to staff  - Offer Toileting every  Hours, in advance of need  - Initiate/Maintain alarm  - Obtain necessary fall risk management equipment:   - Apply yellow socks and bracelet for high fall risk patients  - Consider moving patient to room near nurses station  Outcome: Progressing  Goal: Maintain or return to baseline ADL function  Description: INTERVENTIONS:  -  Assess patient's ability to carry out ADLs; assess patient's baseline for ADL function and identify physical deficits which impact ability to perform ADLs (bathing, care of mouth/teeth, toileting, grooming, dressing, etc )  - Assess/evaluate cause of self-care deficits   - Assess range of motion  - Assess patient's mobility; develop plan if impaired  - Assess patient's need for assistive devices and provide as appropriate  - Encourage maximum independence but intervene and supervise when necessary  - Involve family in performance of ADLs  - Assess for home care needs following discharge   - Consider OT consult to assist with ADL evaluation and planning for discharge  - Provide patient education as appropriate  Outcome: Progressing  Goal: Maintains/Returns to pre admission functional level  Description: INTERVENTIONS:  - Perform BMAT or MOVE assessment daily    - Set and communicate daily mobility goal to care team and patient/family/caregiver  - Collaborate with rehabilitation services on mobility goals if consulted  - Perform Range of Motion  times a day  - Reposition patient every  hours    - Dangle patient  times a day  - Stand patient  times a day  - Ambulate patient  times a day  - Out of bed to chair  times a day   - Out of bed for meals  times a day  - Out of bed for toileting  - Record patient progress and toleration of activity level   Outcome: Progressing     Problem: DISCHARGE PLANNING  Goal: Discharge to home or other facility with appropriate resources  Description: INTERVENTIONS:  - Identify barriers to discharge w/patient and caregiver  - Arrange for needed discharge resources and transportation as appropriate  - Identify discharge learning needs (meds, wound care, etc )  - Arrange for interpretive services to assist at discharge as needed  - Refer to Case Management Department for coordinating discharge planning if the patient needs post-hospital services based on physician/advanced practitioner order or complex needs related to functional status, cognitive ability, or social support system  Outcome: Progressing     Problem: Knowledge Deficit  Goal: Patient/family/caregiver demonstrates understanding of disease process, treatment plan, medications, and discharge instructions  Description: Complete learning assessment and assess knowledge base    Interventions:  - Provide teaching at level of understanding  - Provide teaching via preferred learning methods  Outcome: Progressing     Problem: SKIN/TISSUE INTEGRITY - ADULT  Goal: Skin Integrity remains intact(Skin Breakdown Prevention)  Description: Assess:  -Perform Fabrice assessment every   -Clean and moisturize skin every   -Inspect skin when repositioning, toileting, and assisting with ADLS  -Assess under medical devices such as  every   -Assess extremities for adequate circulation and sensation     Bed Management:  -Have minimal linens on bed & keep smooth, unwrinkled  -Change linens as needed when moist or perspiring  -Avoid sitting or lying in one position for more than  hours while in bed  -Keep HOB at degrees     Toileting:  -Offer bedside commode  -Assess for incontinence every   -Use incontinent care products after each incontinent episode such as     Activity:  -Mobilize patient  times a day  -Encourage activity and walks on unit  -Encourage or provide ROM exercises   -Turn and reposition patient every  Hours  -Use appropriate equipment to lift or move patient in bed  -Instruct/ Assist with weight shifting every  when out of bed in chair  -Consider limitation of chair time  hour intervals    Skin Care:  -Avoid use of baby powder, tape, friction and shearing, hot water or constrictive clothing  -Relieve pressure over bony prominences using   -Do not massage red bony areas    Next Steps:  -Teach patient strategies to minimize risks such as    -Consider consults to  interdisciplinary teams such as   Outcome: Progressing  Goal: Incision(s), wounds(s) or drain site(s) healing without S/S of infection  Description: INTERVENTIONS  - Assess and document dressing, incision, wound bed, drain sites and surrounding tissue  - Provide patient and family education  - Perform skin care/dressing changes every   Outcome: Progressing  Goal: Pressure injury heals and does not worsen  Description: Interventions:  - Implement low air loss mattress or specialty surface (Criteria met)  - Apply silicone foam dressing  - Instruct/assist with weight shifting every  minutes when in chair   - Limit chair time to  hour intervals  - Use special pressure reducing interventions such as  when in chair   - Apply fecal or urinary incontinence containment device   - Perform passive or active ROM every   - Turn and reposition patient & offload bony prominences every  hours   - Utilize friction reducing device or surface for transfers   - Consider consults to  interdisciplinary teams such as   - Use incontinent care products after each incontinent episode such as   - Consider nutrition services referral as needed  Outcome: Progressing     Problem: Prexisting or High Potential for Compromised Skin Integrity  Goal: Skin integrity is maintained or improved  Description: INTERVENTIONS:  - Identify patients at risk for skin breakdown  - Assess and monitor skin integrity  - Assess and monitor nutrition and hydration status  - Monitor labs   - Assess for incontinence   - Turn and reposition patient  - Assist with mobility/ambulation  - Relieve pressure over bony prominences  - Avoid friction and shearing  - Provide appropriate hygiene as needed including keeping skin clean and dry  - Evaluate need for skin moisturizer/barrier cream  - Collaborate with interdisciplinary team   - Patient/family teaching  - Consider wound care consult   Outcome: Progressing     Problem: MOBILITY - ADULT  Goal: Maintain or return to baseline ADL function  Description: INTERVENTIONS:  -  Assess patient's ability to carry out ADLs; assess patient's baseline for ADL function and identify physical deficits which impact ability to perform ADLs (bathing, care of mouth/teeth, toileting, grooming, dressing, etc )  - Assess/evaluate cause of self-care deficits   - Assess range of motion  - Assess patient's mobility; develop plan if impaired  - Assess patient's need for assistive devices and provide as appropriate  - Encourage maximum independence but intervene and supervise when necessary  - Involve family in performance of ADLs  - Assess for home care needs following discharge   - Consider OT consult to assist with ADL evaluation and planning for discharge  - Provide patient education as appropriate  Outcome: Progressing  Goal: Maintains/Returns to pre admission functional level  Description: INTERVENTIONS:  - Perform BMAT or MOVE assessment daily    - Set and communicate daily mobility goal to care team and patient/family/caregiver  - Collaborate with rehabilitation services on mobility goals if consulted  - Perform Range of Motion  times a day  - Reposition patient every  hours    - Dangle patient  times a day  - Stand patient  times a day  - Ambulate patient  times a day  - Out of bed to chair  times a day   - Out of bed for meals  times a day  - Out of bed for toileting  - Record patient progress and toleration of activity level   Outcome: Progressing

## 2023-05-05 NOTE — PROGRESS NOTES
NEPHROLOGY PROGRESS NOTE    Patient: Nelia Heredia               Sex: male          DOA: 5/1/2023  9:52 AM   YOB: 1965        Age:  59 y o         LOS:  LOS: 4 days   5/5/2023    REASON FOR THE CONSULTATION: Hyponatremia, further management of CKD    SUBJECTIVE     Examined at the bedside today  He denies nausea, vomiting, shortness of breath  Reviewed past 24 hour events      CURRENT MEDICATIONS       Current Facility-Administered Medications:   •  acetaminophen (TYLENOL) tablet 650 mg, 650 mg, Oral, Q8H PRN, Meg Jamison PA-C, 650 mg at 05/04/23 2110  •  aspirin chewable tablet 81 mg, 81 mg, Oral, Daily, Meg Jamison PA-C, 81 mg at 05/05/23 1057  •  atorvastatin (LIPITOR) tablet 40 mg, 40 mg, Oral, QAM, Aaron Wright PA-C, 40 mg at 05/05/23 1057  •  clopidogrel (PLAVIX) tablet 75 mg, 75 mg, Oral, QAM, Aaron Wright PA-C, 75 mg at 05/05/23 1056  •  docusate sodium (COLACE) capsule 100 mg, 100 mg, Oral, BID, Meg Jamison PA-C, 100 mg at 05/05/23 1136  •  enoxaparin (LOVENOX) subcutaneous injection 40 mg, 40 mg, Subcutaneous, Daily, Meg Jamison PA-C, 40 mg at 05/05/23 4642  •  escitalopram (LEXAPRO) tablet 10 mg, 10 mg, Oral, Daily, Aaron Wright PA-C, 10 mg at 05/05/23 1057  •  HYDROmorphone (DILAUDID) tablet 2 mg, 2 mg, Oral, Q6H PRN, Lois Stevenson PA-C  •  HYDROmorphone (DILAUDID) tablet 4 mg, 4 mg, Oral, Q6H PRN, Lois Stevenson PA-C, 4 mg at 05/03/23 2055  •  HYDROmorphone HCl (DILAUDID) injection 0 2 mg, 0 2 mg, Intravenous, Q3H PRN, Lois Stevenson PA-C, 0 2 mg at 05/02/23 0405  •  insulin glargine (LANTUS) subcutaneous injection 25 Units 0 25 mL, 25 Units, Subcutaneous, HS, Andrés Mariscal MD, 25 Units at 05/04/23 2110  •  insulin lispro (HumaLOG) 100 units/mL subcutaneous injection 15 Units, 15 Units, Subcutaneous, TID With Meals, Andrés Mariscal MD, 15 Units at 05/05/23 1129  •  insulin lispro (HumaLOG) 100 units/mL subcutaneous injection 2-12 Units, 2-12 Units, Subcutaneous, 4x Daily (AC & HS), 4 Units at 05/05/23 1129 **AND** Fingerstick Glucose (POCT), , , 4x Daily AC and at bedtime, Sami Villagran MD  •  iron sucrose (VENOFER) 200 mg in sodium chloride 0 9 % 100 mL IVPB, 200 mg, Intravenous, Daily, Jennifer Regan MD, Last Rate: 100 mL/hr at 05/04/23 0933, 200 mg at 05/05/23 1037  •  levothyroxine tablet 150 mcg, 150 mcg, Oral, QAM, Aaron Wright PA-C, 150 mcg at 05/05/23 1056  •  losartan (COZAAR) tablet 50 mg, 50 mg, Oral, Daily, 50 mg at 05/05/23 1056 **AND** [DISCONTINUED] hydrochlorothiazide (HYDRODIURIL) tablet 12 5 mg, 12 5 mg, Oral, Daily, [facility] NAILA Villa  •  pantoprazole (PROTONIX) EC tablet 40 mg, 40 mg, Oral, Early Morning, Aaron Wright PA-C, 40 mg at 05/05/23 0855  •  tamsulosin (FLOMAX) capsule 0 4 mg, 0 4 mg, Oral, Daily With Dinner, Colonel Alison PA-C, 0 4 mg at 05/04/23 1738  •  verapamil (CALAN-SR) CR tablet 360 mg, 360 mg, Oral, HS, Aaron Wright PA-C, 360 mg at 05/04/23 2110  •  zolpidem (AMBIEN) tablet 10 mg, 10 mg, Oral, HS PRN, Colonel Alison PA-C, 10 mg at 05/04/23 2110    REVIEW OF SYSTEMS     Review of Systems   Constitutional: Negative for activity change, chills, fatigue and fever  HENT: Negative for hearing loss, nosebleeds and trouble swallowing  Respiratory: Negative for cough and shortness of breath  Cardiovascular: Negative for chest pain and leg swelling  Gastrointestinal: Negative for constipation, diarrhea, nausea and vomiting  Genitourinary: Negative for difficulty urinating, dysuria, frequency and hematuria  Musculoskeletal: Negative for back pain  RLE pain   Skin: Negative for pallor  Neurological: Negative for dizziness, syncope, weakness and light-headedness  Psychiatric/Behavioral: Negative for sleep disturbance  The patient is not nervous/anxious          OBJECTIVE     Current Weight: Weight - Scale: 122 kg (268 lb 15 4 oz)  Vitals:    05/05/23 1055   BP: 130/72   Pulse:    Resp:    Temp:    SpO2:      Body mass index is 32 74 kg/m²  Intake/Output Summary (Last 24 hours) at 5/5/2023 1146  Last data filed at 5/5/2023 0601  Gross per 24 hour   Intake 300 ml   Output 550 ml   Net -250 ml       PHYSICAL EXAMINATION     Physical Exam  Vitals reviewed  Constitutional:       General: He is not in acute distress  HENT:      Head: Normocephalic  Mouth/Throat:      Lips: Pink  Mouth: Mucous membranes are moist    Eyes:      General: Lids are normal  No scleral icterus  Cardiovascular:      Rate and Rhythm: Normal rate and regular rhythm  Heart sounds: S1 normal and S2 normal  No murmur heard  Pulmonary:      Effort: Pulmonary effort is normal  No accessory muscle usage or respiratory distress  Abdominal:      General: There is no distension  Tenderness: There is no abdominal tenderness  Musculoskeletal:      Cervical back: Normal range of motion and neck supple  No tenderness  Comments: Ace wrap dressing to right lower extremity   Skin:     General: Skin is warm  Coloration: Skin is not cyanotic or jaundiced  Neurological:      General: No focal deficit present  Mental Status: He is alert and oriented to person, place, and time  Psychiatric:         Attention and Perception: Attention normal          Speech: Speech normal          Behavior: Behavior is cooperative             LAB RESULTS     Results from last 7 days   Lab Units 05/05/23  0922 05/04/23  1807 05/04/23  0813 05/03/23  2038 05/03/23  0459 05/02/23  0502 05/01/23  1703   WBC Thousand/uL  --   --  16 19*  --  19 42* 13 40*  --    HEMOGLOBIN g/dL 8 0* 8 1* 8 0* 8 0* 8 1* 9 1*  --    I STAT HEMOGLOBIN g/dl  --   --   --   --   --   --  10 2*   HEMATOCRIT % 24 3* 24 8* 24 4* 24 4* 25 0* 27 9*  --    HEMATOCRIT, ISTAT %  --   --   --   --   --   --  30*   PLATELETS Thousands/uL  --   --  263  --  260 288  --    SODIUM mmol/L 132* 131* 132*  --  129* 134*  --    POTASSIUM mmol/L 3 6 3 4* 3 6  --  3 9 4 0  --    CHLORIDE mmol/L 100 100 100  --  99 102  --    CO2 mmol/L 25 24 24  --  24 22  --    CO2, I-STAT mmol/L  --   --   --   --   --   --  25   BUN mg/dL 19 16 19  --  22 20  --    CREATININE mg/dL 0 75 0 68 0 75  --  0 90 1 04  --    EGFR ml/min/1 73sq m 99 103 99  --  92 77  --    CALCIUM mg/dL 8 6 8 5 8 5  --  8 3* 8 8  --    GLUCOSE, ISTAT mg/dl  --   --   --   --   --   --  109       RADIOLOGY RESULTS      No results found for this or any previous visit  Results for orders placed during the hospital encounter of 03/03/23    XR chest 2 views    Narrative  CHEST    INDICATION:   diminished L lung sounds  COMPARISON:  11/15/2018    EXAM PERFORMED/VIEWS:  XR CHEST PA & LATERAL      FINDINGS:    Marked patient rotation  Cardiomediastinal silhouette appears unremarkable  The lungs are clear  No pneumothorax or pleural effusion  Osseous structures appear within normal limits for patient age  Impression  No acute cardiopulmonary disease  Workstation performed: [id number]      ASSESSMENT/PLAN     20-year-old male with a past medical history of CKD stage III, hypertension, type 2 diabetes, peripheral arterial disease, and prior Goprelto fracture of the right tibia status post repair      Chronic kidney disease stage:  After review of the medical record, it appears that baseline creatinine fluctuates from 0 9-1 1   He did have prior episodes of acute kidney injury in the past, but was admitted for procedure with creatinine level within suspected baseline   Etiology of CKD likely secondary to longstanding history of hypertension and diabetes      Creatinine remains stable at 0 75 with an EGFR of 99 following surgical procedure      Hypertension:  Currently maintained on losartan 50 mg once daily and verapamil 360 mg once daily    Blood pressure readings within acceptable range over the past 24 "hours      Hyponatremia:  Urine osmolality 646, urine sodium 50, indicative of SIADH etiology  Suspect multifactorial in the setting of setting of pain, IVF, and use of HCTZ  Following discontinuation of HCTZ,, sodium levels improving  Current corrected sodium level today is 134 when accounting for hyperglycemia  Recommend continuing to hold HCTZ on hospital discharge      Anemia:  Current hemoglobin is low at 8 0, iron profile revealed iron saturation of 7% and low  Currently receiving IV Venofer per primary care team     Disposition: Patient is okay for hospital discharge from a nephrology standpoint and would recommend discontinuing hydrochlorothiazide and repeating BMP within 1 week of discharge  Yun Matthew   Nephrology  5/5/2023      Portions of the record may have been created with voice recognition software  Occasional wrong word or \"sound a like\" substitutions may have occurred due to the inherent limitations of voice recognition software  Read the chart carefully and recognize, using context, where substitutions have occurred    "

## 2023-05-05 NOTE — ASSESSMENT & PLAN NOTE
Chronic, subpar control, as evidenced by a hemoglobin A1C of 7 9  Home regimen includes: 45 units Lantus at bedtime with Apidra 30 units TID with meals  Continue  insulin sliding scale in addition to 30 units Lantus at bedtime and increase mealtime insulin to 18-18-18  Monitor blood glucose AC/HS  Hypoglycemia protocol

## 2023-05-05 NOTE — ASSESSMENT & PLAN NOTE
· S/p reconstruction of proximal tibia today however may require cement spacer as well as possible external fixator placement  · Pain management per primary team  Ortho primary  · Pain is uncontrolled as per the patient today  Patient does not want Percocet at discharge  He is willing to try 2 days of p o  Dilaudid at discharge    IV Dilaudid has been making him very nauseous and we have been giving him known QT prolonging antinausea agents like Tigan and Zofran when QTc has been less than 500

## 2023-05-05 NOTE — PROGRESS NOTES
Progress Note - Orthopedics   Ludy Swanson 59 y o  male MRN: [id number]  Unit/Bed#: -01      Subjective:    59 y o male POD#4 s/p ORIF Right tibial plateau nonunion  Patient is resting comfortably in hospital bed this afternoon  Patient reports pain is well controlled at the moment in right lower extremity  Patient denies any numbness or tingling in right lower extremity  Patient has maintained nonweightbearing status with knee immobilizer  Patient states he would like to go to rehab  Patient offers no additional complaints      Labs:  0   Lab Value Date/Time    HCT 23 9 (L) 05/05/2023 1448    HCT 24 3 (L) 05/05/2023 0922    HCT 24 8 (L) 05/04/2023 1807    HGB 7 9 (L) 05/05/2023 1448    HGB 8 0 (L) 05/05/2023 0922    HGB 8 1 (L) 05/04/2023 1807    INR 1 04 08/04/2020 1358    WBC 13 73 (H) 05/05/2023 1448    WBC 16 19 (H) 05/04/2023 0813    WBC 19 42 (H) 05/03/2023 0459    ESR 24 (H) 02/21/2020 1101       Meds:    Current Facility-Administered Medications:   •  acetaminophen (TYLENOL) tablet 650 mg, 650 mg, Oral, Q8H PRN, Rajesh Felix PA-C, 650 mg at 05/04/23 2110  •  aspirin chewable tablet 81 mg, 81 mg, Oral, Daily, Rajesh Felix PA-C, 81 mg at 05/05/23 1057  •  atorvastatin (LIPITOR) tablet 40 mg, 40 mg, Oral, QAM, Aaron Wright PA-C, 40 mg at 05/05/23 1057  •  clopidogrel (PLAVIX) tablet 75 mg, 75 mg, Oral, QAM, Aaron Wright PA-C, 75 mg at 05/05/23 1056  •  docusate sodium (COLACE) capsule 100 mg, 100 mg, Oral, BID, Aaron Wright PA-C, 100 mg at 05/05/23 1723  •  enoxaparin (LOVENOX) subcutaneous injection 40 mg, 40 mg, Subcutaneous, Daily, Rajesh Felix PA-C, 40 mg at 05/05/23 0543  •  escitalopram (LEXAPRO) tablet 10 mg, 10 mg, Oral, Daily, Aaron Wright PA-C, 10 mg at 05/05/23 1057  •  HYDROmorphone (DILAUDID) tablet 2 mg, 2 mg, Oral, Q6H PRN, Ruth Ann Hargrove PA-C  •  HYDROmorphone (DILAUDID) tablet 4 mg, 4 mg, Oral, Q6H PRN, Ruth Ann Hargrove PA-C, 4 mg at 05/05/23 1329  •  HYDROmorphone HCl (DILAUDID) injection 0 2 mg, 0 2 mg, Intravenous, Q3H PRN, Ruth Ann Hargrove PA-C, 0 2 mg at 05/02/23 0405  •  insulin glargine (LANTUS) subcutaneous injection 30 Units 0 3 mL, 30 Units, Subcutaneous, HS, Jennifer Regan MD  •  insulin lispro (HumaLOG) 100 units/mL subcutaneous injection 18 Units, 18 Units, Subcutaneous, TID With Meals, Jennifer Regan MD  •  insulin lispro (HumaLOG) 100 units/mL subcutaneous injection 2-12 Units, 2-12 Units, Subcutaneous, 4x Daily (AC & HS), 4 Units at 05/05/23 1129 **AND** Fingerstick Glucose (POCT), , , 4x Daily AC and at bedtime, Sami Villagran MD  •  iron sucrose (VENOFER) 200 mg in sodium chloride 0 9 % 100 mL IVPB, 200 mg, Intravenous, Daily, Jennifer Regan MD, Last Rate: 100 mL/hr at 05/04/23 0933, 200 mg at 05/05/23 1037  •  levothyroxine tablet 150 mcg, 150 mcg, Oral, QA, Aaron Wright PA-C, 150 mcg at 05/05/23 1056  •  losartan (COZAAR) tablet 50 mg, 50 mg, Oral, Daily, 50 mg at 05/05/23 1056 **AND** [DISCONTINUED] hydrochlorothiazide (HYDRODIURIL) tablet 12 5 mg, 12 5 mg, Oral, Daily, [facility] NAILA Villa  •  pantoprazole (PROTONIX) EC tablet 40 mg, 40 mg, Oral, Early Morning, Aaron Wright PA-C, 40 mg at 05/05/23 9386  •  tamsulosin (FLOMAX) capsule 0 4 mg, 0 4 mg, Oral, Daily With Dinner, Colonel Alison PA-C, 0 4 mg at 05/04/23 1738  •  trimethobenzamide (TIGAN) IM injection 200 mg, 200 mg, Intramuscular, Q6H PRN, NAILA Foy, 200 mg at 05/05/23 1323  •  verapamil (CALAN-SR) CR tablet 360 mg, 360 mg, Oral, HS, Aaron Wright PA-C, 360 mg at 05/04/23 2110  •  zolpidem (AMBIEN) tablet 10 mg, 10 mg, Oral, HS PRN, Colonel Alison PA-C, 10 mg at 05/04/23 2110    Blood Culture:   Lab Results   Component Value Date    BLOODCX No Growth After 5 Days  10/10/2020    BLOODCX No Growth After 5 Days   10/10/2020       Wound Culture:   Lab Results   Component Value Date    WOUNDCULT 2+ Growth of Morganella morganii (A) 08/30/2020    WOUNDCULT (A) 08/30/2020     2+ Growth of Methicillin Resistant Staphylococcus aureus    WOUNDCULT 1+ Growth of Enterobacter cloacae complex (A) 08/30/2020    WOUNDCULT 1+ Growth of Enterococcus faecalis (A) 08/30/2020       Ins and Outs:  I/O last 24 hours: In: 1140 [P O :1140]  Out: 1500 [Urine:1500]          Physical:  Vitals:    05/05/23 1634   BP: 129/71   Pulse: 72   Resp: 16   Temp: 98 3 °F (36 8 °C)   SpO2:      Musculoskeletal:  Right lower extremity  • Patient is a 66-year-old male laying comfortably in hospital bed in no acute distress  • Knee immobilizer intact upon evaluation  • Bulky dressing clean, dry and intact without evidence of strike through  • TTP throughout Right lower extremity  • Sensation intact to saphenous, sural, tibial, superficial peroneal nerve, and deep peroneal  • Motor intact to ankle dorsi/plantar flexion  • 2+ DP pulse, symmetric bilaterally  • Extremity warm and well perfused    Assessment:    58 y o male POD#4 s/p ORIF Right tibial plateau nonunion    Plan:  • Nonweightbearing Right lower extremity with knee immobilizer intact at all times   • Will monitor for ABLA and administer IVF/prbc as indicated for Greater than 2 gram drop or Hgb < 7  Hgb of 7 9 today  • PT/OT- up and out of bed, gait training while maintaining NWB status Right lower extremity  • Pain control  • DVT ppx- Lovenox 40mg once daily + Plavix   • Dispo: Patient is now medically stable per SLIM  Awaiting placement at rehab facility at this time  See above for additional details       Avril Gant PA-C

## 2023-05-05 NOTE — PROGRESS NOTES
PHYSICAL MEDICINE AND REHABILITATION   PREADMISSION ASSESSMENT     Projected [facility] Diagnoses:  Impairment of mobility, safety and Activities of Daily Living (ADLs) due to Orthopedic Disorders:  08 9  Other Orthopedic Closed fracture of medial plateau of right tibia  Etiologic: Closed fracture of medial plateau of right tibia  Date of Onset: 5/1/23   Date of surgery: 5/1/23    PATIENT INFORMATION  Name: Merlene Patten Phone #: [phone] (home)   Address: [address] [place] [address]  YOB: 1965 Age: 59 y o  SS#   Marital Status: Single  Ethnicity: White  Employment Status: on disability  Extended Emergency Contact Information  Primary Emergency Contact: [address] Phone: [phone]  Work Phone: [phone]  Mobile Phone: [phone]  Relation: Spouse  Advance Directive: Level 1 Full Code (no ACP docs)    INSURANCE/COVERAGE:     Primary Payor: BLUE CROSS / Plan: [employer] HORIZ[employer] BC PLAN 280 / Product Type: Blue Fee for Service /   Secondary Payer:Self Pay   Payer Contact: Betzy Payer Contact:   Contact Phone: [phone] Contact Phone:     Authorization #: [id number]     Coverage Dates:5/10-5/15  LCD: 5/15 next review due submit to Carisa Carrington Fax# [phone]  Medical Record #: [id number]    REFERRAL SOURCE:   Referring provider: Lali Hickman MD  Referring facility: [address] [place] [address]  Room: [facility]/[place] [address]  PCP: Aide Uriostegui MD PCP phone number: [phone]    MEDICAL INFORMATION  HPI: Pt is a 59year old male with PMH of diabetes, GERD, PAD, HTN, HLD who originally admitted to the orthopedics service due to right knee comminuted tibial plateau fracture sustained on 1/27/23  Pt originally opted for conservative management even though surgical fixation was recommended treatment for this pattern  Increased displacement and varus deformity in comparison to recent radiographs   Now, fracture has collapsed with subluxation of knee joint  Pt is s/p ORIF right tibial plateau nonunion on 5/1  Nonweightbearing Right lower extremity with knee immobilizer intact at all times  DVT ppx- Lovenox 40mg once daily + Plavix  Hyponatremia unclear etiology  Multifactorial in setting of IVF/Hctz and pain  Nephrology following, appreciate input  Sodium level improving today to 133-134  Pain better controlled with PO dilaudid now  PT and OT have been consulted and are recommending post-acute rehab services  Patient's case has been reviewed with [facility] medical director, patient meets medical criteria for acute rehab and has demonstrated the ability to tolerate three or more hours of therapy per day  Patient is medically stable and ready for discharge to [facility]  Past Medical History:   Past Surgical History:    Allergies:     Past Medical History:   Diagnosis Date   • Broken internal right knee prosthesis ([facility] [address] ) 01/2023   • Chronic kidney disease    • Colon polyp    • Constipation 03/09/2023   • Diabetes mellitus ([facility] [address] )    • Disease of thyroid gland    • GERD (gastroesophageal reflux disease)    • HHS vs DKA 11/16/2018   • Hyperlipidemia    • Hypertension    • Thyroid cancer [facility])     Past Surgical History:   Procedure Laterality Date   • COLONOSCOPY     • FOOT AMPUTATION Right    • FOOT SURGERY Right 2014   • IR AORTAGRAM WITH RUN-OFF  08/06/2020   • IR TUNNELED CENTRAL LINE PLACEMENT  09/08/2020   • IR TUNNELED CENTRAL LINE REMOVAL  09/28/2020   • CA AMPUTATION FOOT TRANSMETARSAL Right 09/03/2020    Procedure: AMPUTATION TRANSMETATARSAL (TMA);  Surgeon: Jamir Wesley DPM;  Location: [facility];  Service: Podiatry   • CA OPEN [facility] UNICONDYLAR Right 5/1/2023    Procedure: OPEN REDUCTION W/ INTERNAL FIXATION (ORIF) RIGHT TIBIAL PLATEAU NONUNION;  Surgeon: Norberto Avila MD;  Location: [facility];  Service: Orthopedics   • CA THYROIDECTOMY TOTAL/COMPLETE N/A 02/03/2021    Procedure: TOTAL THYROIDECTOMY WITH NIMS MONITORING;  Surgeon: Kurtis Nageotte, MD;  Location: [facility];  Service: ENT   • TOE AMPUTATION Right 08/11/2020    Procedure: AMPUTATION TOE;  Surgeon: Charles Go DPM;  Location: [facility];  Service: Podiatry   • US GUIDED THYROID BIOPSY  07/02/2020     Allergies   Allergen Reactions   • Pollen Extract Eye Swelling     Eyes get watery          Medical/functional conditions requiring inpatient rehabilitation: Closed fracture of medial plateau of right tibia, Hyponatremia, Pain, Decreased self care, Decreased mobility, Decreased strength/endurance    Risk for medical/clinical complications: Risk for falls, Risk for skin breakdown secondary to decreased mobility, Risk for increased pain, Risk for hyper/hypoglycemic episodes, Risk for hypertensive episodes    Comorbidities: Closed fracture of medial plateau of right tibia, Hyponatremia, DM, ABLA, PAD, HTN    Surgeries in the last 100 days: s/p ORIF right tibial plateau nonunion on 5/1    CURRENT VITAL SIGNS:   Temp:  [97 7 °F (36 5 °C)-99 9 °F (37 7 °C)] 97 7 °F (36 5 °C)  HR:  [86] 86  Resp:  [18] 18  BP: (100-125)/(61-79) 109/71   Intake/Output Summary (Last 24 hours) at 5/10/2023 1354  Last data filed at 5/10/2023 1229  Gross per 24 hour   Intake 480 ml   Output 825 ml   Net -345 ml        LABORATORY RESULTS:      Lab Results   Component Value Date    HGB 8 0 (L) 05/09/2023    HCT 24 7 (L) 05/09/2023    WBC 10 06 05/09/2023     Lab Results   Component Value Date    BUN 13 05/09/2023    K 3 6 05/09/2023     05/09/2023    GLUCOSE 109 05/01/2023    CREATININE 0 64 05/09/2023     Lab Results   Component Value Date    PROTIME 13 8 08/04/2020    INR 1 04 08/04/2020        DIAGNOSTIC STUDIES:  XR knee 1 or 2 vw right    Result Date: 5/1/2023  Impression: Fluoroscopic guidance provided for procedure guidance  Please refer to the separate procedure notes for additional details   Workstation performed: [id number]       PRECAUTIONS/SPECIAL NEEDS:  Weight Bearing Precautions:  NWB R LE and immobilizer at all times, Anticoagulation:  aspirin 81 mg orally every day and clopidogrel 75 mg orally every day, Blood Sugar Management: Per MD orders, Edema Management, Safety Concerns, Pain Management, Dietary Restrictions: Gustavo/CHO controlled and Language Preference: English    MEDICATIONS:     Current Facility-Administered Medications:   •  acetaminophen (TYLENOL) tablet 650 mg, 650 mg, Oral, Q8H PRN, Lynda Mistry PA-C, 650 mg at 05/09/23 1551  •  aspirin chewable tablet 81 mg, 81 mg, Oral, Daily, KATE Mares-C, 81 mg at 05/10/23 7578  •  atorvastatin (LIPITOR) tablet 40 mg, 40 mg, Oral, QAM, Aaron Wright PA-C, 40 mg at 05/10/23 7728  •  clopidogrel (PLAVIX) tablet 75 mg, 75 mg, Oral, QAM, Aaron Wright PA-C, 75 mg at 05/10/23 0749  •  docusate sodium (COLACE) capsule 100 mg, 100 mg, Oral, BID, Aaron Wright PA-C, 100 mg at 05/10/23 3514  •  enoxaparin (LOVENOX) subcutaneous injection 40 mg, 40 mg, Subcutaneous, Daily, Lynda Mistry PA-C, 40 mg at 05/10/23 6663  •  escitalopram (LEXAPRO) tablet 10 mg, 10 mg, Oral, Daily, Lynda Mistry PA-C, 10 mg at 05/07/23 1683  •  HYDROmorphone (DILAUDID) tablet 2 mg, 2 mg, Oral, Q6H PRN, Cecy Nagy PA-C  •  HYDROmorphone (DILAUDID) tablet 4 mg, 4 mg, Oral, Q6H PRN, Ruth Ann Hargrove PA-C, 4 mg at 05/10/23 1225  •  HYDROmorphone HCl (DILAUDID) injection 0 2 mg, 0 2 mg, Intravenous, Q3H PRN, Ruth Ann Hargrove PA-C, 0 2 mg at 05/02/23 0405  •  insulin glargine (LANTUS) subcutaneous injection 20 Units 0 2 mL, 20 Units, Subcutaneous, HS, Cayetano Tello MD, 20 Units at 05/09/23 2222  •  insulin lispro (HumaLOG) 100 units/mL subcutaneous injection 15 Units, 15 Units, Subcutaneous, TID With Meals, Cayetano Tello MD, 15 Units at 05/10/23 0850  •  insulin lispro (HumaLOG) 100 units/mL subcutaneous injection 2-12 Units, 2-12 Units, Subcutaneous, 4x Daily (AC & HS), 4 Units at 05/10/23 1221 **AND** Fingerstick Glucose (POCT), , , 4x Daily AC and at bedtime, Dahiana Yee MD  •  levothyroxine tablet 150 mcg, 150 mcg, Oral, QAM, Aaron Wright PA-C, 150 mcg at 05/10/23 7234  •  magnesium hydroxide (MILK OF MAGNESIA) oral suspension 30 mL, 30 mL, Oral, Daily PRN, Priscila Trinidad MD, 30 mL at 05/08/23 1414  •  pantoprazole (PROTONIX) EC tablet 40 mg, 40 mg, Oral, Early Morning, Aaron Wright PA-C, 40 mg at 05/10/23 0503  •  senna-docusate sodium (SENOKOT S) 8 6-50 mg per tablet 1 tablet, 1 tablet, Oral, Daily, Nisreen Camejo PA-C, 1 tablet at 05/10/23 8415  •  tamsulosin (FLOMAX) capsule 0 4 mg, 0 4 mg, Oral, Daily With Dinner, Lynda Mistry PA-C, 0 4 mg at 05/09/23 1746  •  trimethobenzamide (TIGAN) IM injection 200 mg, 200 mg, Intramuscular, Q6H PRN, NAILA Eugene, 200 mg at 05/10/23 1229  •  verapamil (CALAN-SR) CR tablet 360 mg, 360 mg, Oral, HS, Aaron Wright PA-C, 360 mg at 05/08/23 2152  •  zolpidem (AMBIEN) tablet 10 mg, 10 mg, Oral, HS PRN, Lynda Mistry PA-C, 10 mg at 05/09/23 2221    SKIN INTEGRITY:   Right LE    PRIOR LEVEL OF FUNCTION:  He lives in a(n) single family home  Jaci Kim is single and lives with significant other  Self Care: wife helped with set up, Indoor Mobility: independent with w/c , Stairs (in/outdoor): Dependent and Cognition: Independent    FALLS IN THE LAST 6 MONTHS: 1-4    HOME ENVIRONMENT:  The living area: can live on one level  There are 3 steps to enter the home  The patient will not have 24 hour supervision/physical assistance available upon discharge      PREVIOUS DME:  Equipment in home (previous DME): Commode, Rolling Walker, Crutches, Manual Wheelchair and Single [employer]    FUNCTIONAL STATUS:  Physical Therapy Occupational Therapy Speech Therapy   05/08/23 1255    PT Last Visit   PT Visit Date 05/08/23   Note Type   Note Type Treatment   Pain Assessment   Pain Assessment Tool 0-10 Pain Score 9   Pain Location/Orientation Orientation: Right;Location: Knee   Pain Onset/Description Onset: Ongoing;Frequency: Constant/Continuous   Patient's Stated Pain Goal No pain   Hospital Pain Intervention(s) Repositioned   Restrictions/Precautions   Weight Bearing Precautions Per Order Yes   RLE Weight Bearing Per Order NWB   Braces or Orthoses Knee immobilizer  (donned and readjusted)   Other Precautions Bed Alarm; Fall Risk;Pain;WBS   General   Chart Reviewed Yes   Response to Previous Treatment Patient with no complaints from previous session  Family/Caregiver Present No   Cognition   Overall Cognitive Status WFL   Arousal/Participation Alert; Responsive; Cooperative   Attention Within functional limits   Orientation Level Oriented X4   Memory Within functional limits   Following Commands Follows all commands and directions without difficulty   Comments Pt agreeable to PT   Bed Mobility   Rolling R 5  Supervision   Additional items Assist x 1;HOB elevated; Bedrails;Verbal cues   Rolling L 5  Supervision   Additional items Assist x 1;HOB elevated; Bedrails;Verbal cues   Supine to Sit 5  Supervision   Additional items Assist x 1;HOB elevated; Bedrails;Verbal cues   Sit to Supine 5  Supervision   Additional items Assist x 1;HOB elevated; Bedrails;Verbal cues   Additional Comments Pt received supine in bed with HOB elevated   Following session, pt returned to bed and remained with needs met, alarm activated and call bell within reach   Transfers   Sit to Stand Unable to assess   Additional Comments Pt declines to stand at this time due to reports of increased pain in RLE   Ambulation/Elevation   Gait pattern Not tested   Balance   Static Sitting Fair +   Dynamic Sitting Fair   Endurance Deficit   Endurance Deficit Yes   Endurance Deficit Description decreased activity tolerance   Activity Tolerance   Activity Tolerance Patient limited by fatigue;Patient limited by pain   Medical Staff Made Aware Co-treat with OT Татьяна Carbajal due to medical complexity and clinical presentation   Nurse Made Aware RN [place]   Exercises   Heelslides Supine;15 reps;AROM; Bilateral   Hip Flexion Sitting;15 reps;AROM; Left   Hip Abduction Supine;15 reps;AROM; Bilateral   Knee AROM Long Arc Quad Sitting;20 reps;AROM; Left   Ankle Pumps Sitting;20 reps;AROM; Bilateral   Assessment   Prognosis Fair   Problem List Decreased strength;Decreased range of motion;Decreased endurance; Impaired balance;Decreased mobility;Orthopedic restrictions;Pain; Impaired sensation   Assessment Pt seen for PT treatment session this date with interventions consisting of Therapeutic exercise consisting of: BLE exercises performed seated and supine and therapeutic activity consisting of training: bed mobility, supine<>sit transfers and static sitting tolerance at EOB for 15 minutes w/ varying UE support  Pt agreeable to PT treatment session upon arrival, pt found supine in bed w/ HOB elevated, in no apparent distress and responsive  In comparison to previous session, pt with improvements in activity tolerance and balance  Post session: pt returned BTB, bed alarm engaged, all needs in reach and RN notified of session findings/recommendations  Continue to recommend post acute rehabilitation services at time of d/c in order to maximize pt's functional independence and safety w/ mobility  Pt continues to be functioning below baseline level, and remains limited 2* factors listed above and including continued need for medical management and monitoring, decreased strength and balance resulting in an increased risk for falls, decreased endurance and activity tolerance limiting mobility and increased pain  PT will continue to see pt during current hospitalization in order to address the deficits listed above and provide interventions consistent w/ POC in effort to achieve STGs        05/08/23 1314    OT Last Visit   OT Visit Date 05/08/23   Note Type   Note Type Treatment for insurance authorization   Pain Assessment   Pain Assessment Tool 0-10   Pain Score 9   Pain Location/Orientation Orientation: Right;Location: Knee   Pain Onset/Description Onset: Ongoing;Frequency: Constant/Continuous   Hospital Pain Intervention(s) Medication (See MAR); Repositioned; Ambulation/increased activity   Restrictions/Precautions   Weight Bearing Precautions Per Order Yes   RLE Weight Bearing Per Order NWB   Braces or Orthoses Knee immobilizer   Other Precautions Fall Risk;Pain;WBS; Bed Alarm   Lifestyle   Autonomy Patient reporting being independent with ADLs, ambulatory with wheelchair and lives with wife in a one level house   Reciprocal Relationships Wife and friend   Service to Others On disability   ADL   Where Assessed Supine, bed  (Pt declined bathing as he stated he did all ADLs yesterday with wife)   LB Dressing Assistance 3  Moderate Assistance   LB Dressing Deficit Setup;Verbal cueing; Increased time to complete; Thread RLE into pants; Thread LLE into pants;Pull up over hips   LB Dressing Comments Pt donned shorts   Bed Mobility   Rolling R 5  Supervision   Additional items Assist x 1;Bedrails; Increased time required;Verbal cues   Rolling L 5  Supervision   Additional items Assist x 1;Bedrails; Increased time required;Verbal cues   Supine to Sit 5  Supervision   Additional items Assist x 1;HOB elevated; Bedrails; Increased time required;Verbal cues   Sit to Supine 5  Supervision   Additional items Assist x 1;HOB elevated; Bedrails; Increased time required;Verbal cues   Transfers   Sit to Stand Unable to assess  (Pt adamently refused standing trial d/t pain  Stated that he will attempt standing once at rehab )   Therapeutic Exercise - ROM   UE-ROM Yes  (to increase strength and endurance to improve independence with ADLs)   ROM- Right Upper Extremities   R Shoulder AROM; Flexion; Extension;Horizontal ABduction  (Horizontal Adduction, Protraction/Retraction)   R Position Supine;Against gravity   R Weight/Reps/Sets 1 set x 15 reps each   ROM - Left Upper Extremities    L Shoulder AROM; Flexion; Extension;Horizontal ABduction  (Horizontal Adduction, Protraction/Retraction)   L Position Seated;Against gravity   L Weight/Reps/Sets 1 set x 15 reps each   Cognition   Overall Cognitive Status WFL   Arousal/Participation Alert; Responsive; Cooperative   Attention Within functional limits   Orientation Level Oriented X4   Memory Within functional limits   Following Commands Follows all commands and directions without difficulty   Activity Tolerance   Activity Tolerance Patient limited by pain; Patient limited by fatigue   Medical Staff Made Aware Pt seen as a co-treat with PT Sandra Freeman   Assessment   Assessment Patient participated in Skilled OT session this date with interventions consisting of ADL re training with the use of correct body mechnaics, therapeutic exercise to: increase functional use of BUEs, increase BUE muscle strength ,  therapeutic activities to: increase activity tolerance and increase OOB/ sitting tolerance   Patient agreeable to OT treatment session, upon arrival patient was found supine in bed and in no apparent distress  Patient completed bed mobility with supervision and sat EOB with supervision  While seated at EOB, pt completed 1 set x 15 reps of BUE exercises  While supine in bed, pt donned shorts with mod assist  In comparison to previous session, patient with improvements in sitting tolerance and bed mobility  Patient requiring one step directives and frequent rest periods  Patient continues to be functioning below baseline level, occupational performance remains limited secondary to factors listed above and increased risk for falls and injury  From OT standpoint, recommendation at time of d/c would be Post acute rehabilitation services     Patient to benefit from continued Occupational Therapy treatment while in the hospital to address deficits as defined above and maximize level of functional independence with ADLs and functional mobility  CARE SCORES:  Self Care:  Eatin: Supervision or touching  assistance  Oral hygiene: 04: Supervision or touching  assistance  Toilet hygiene: 02: Substantial/maximal assistance  Shower/bathing self: 02: Substantial/maximal assistance  Upper body dressin: Partial/moderate assistance  Lower body dressin: Partial/moderate assistance  Putting on/taking off footwear: 09: Not applicable  Transfers:  Roll left and right: 04: Supervision or touching  assistance  Sit to lyin: Supervision or touching  assistance  Lying to sitting on side of bed: 04: Supervision or touching  assistance  Sit to stand: 07: Patient Refused  Chair/bed to chair transfer: 07: Patient Refused  Toilet transfer: 07: Patient Refused  Mobility:  Walk 10 ft: 88: Not attempted due to medical conditions or safety concerns  Walk 50 ft with two turns: 88: Not attempted due to medical conditions or safety concerns  Walk 150ft: 88: Not attempted due to medical conditions or safety concerns    CURRENT GAP IN FUNCTION  Prior to Admission: Patient was I with w/c mobility at baseline in the house, was sponge bathing and dressing I at home prior to admission  Expected functional outcomes: It is expected that with skilled acute rehabilitation services the patient will progress to Independent for self care and Supervision for mobility     Estimated length of stay: 10 to 14 days    Anticipated Post-Discharge Disposition/Treatment  Disposition: Return to previous home/apartment    Outpatient Services: Physical Therapy (PT) and Occupational Therapy (OT)    BARRIERS TO DISCHARGE  Weakness, Pain, Balance Difficulty, Fatigue, Home Accessibility, Caregiver Accessibility, Equipment Needs and Resource Availability    INTERVENTIONS FOR DISCHARGE  Adaptive equipment, Patient/Family/Caregiver Education, [employer], Arrange DME needs, Therapy exercises and Energy conservation education     REQUIRED THERAPY:  Patient will require PT and OT 90 minutes each per day, five days per week to achieve rehab goals  REQUIRED FUNCTIONAL AND MEDICAL MANAGEMENT FOR INPATIENT REHABILITATION:  Skin:  Monitor skin for breakdown secondary to decreased mobility, Pain Management: Overall pain is moderately controlled, Deep Vein Thrombosis (DVT) Prophylaxis:  Per MD orders, Diabetes Management: continue sliding scale insulin, patient to do finger sticks as ordered, SLIM to continue to manage diabetes, further internal medicine management of additional medical conditions while on ARC, PT/OT intervention, patient/family education and training, and any needed consults PRN  RECOMMENDED LEVEL OF CARE:    Pt is a 59year old male with PMH of diabetes, GERD, PAD, HTN, HLD who originally admitted to the orthopedics service due to right knee comminuted tibial plateau fracture sustained on 1/27/23  Pt originally opted for conservative management even though surgical fixation was recommended treatment for this pattern  Increased displacement and varus deformity in comparison to recent radiographs  Now, fracture has collapsed with subluxation of knee joint  Pt is s/p ORIF right tibial plateau nonunion on 5/1  Nonweightbearing Right lower extremity with knee immobilizer intact at all times  DVT ppx- Lovenox 40mg once daily + Plavix  Hyponatremia unclear etiology  Multifactorial in setting of IVF/Hctz and pain  Nephrology following, appreciate input  Sodium level improving today to 132  Lives with his spouse in a 1 story home with 3 DANIEL  Patient's spouse says they were using ramps to get in and out of the house  Patient was I with w/c mobility at baseline in the house, was sponge bathing and dressing I at home prior to admission  Patient has commode, crutches walker, cane and w/c at home  Transport w/c for appointments  Friend was helping transport  Home alone at times when wife is working  Patient is currently on disability   Pt is currently functioning below baseline needing Supervision for rolling and bed mobility and up to Max A for ADLs  Pt would benefit from [facility] admission to have close medical management while participating in 3 hours of therapy per day that will include physical and occupational therapy  Physical and occupational therapists will address functional mobility deficits and assist patient in improving their strength, endurance, ROM, and self care  Pt will have 24/7 nursing care to monitor routine vitals, blood sugars, I/Os, skin integrity, and overall condition  The rehab nursing staff will follow therapy recommendations to have 24 hour follow through during non-therapy hours  PM&R to maximize function and provide medical oversight  The MD will monitor patient co-morbidities while on the unit as well as order any additional tests, labs, and consults needed  The [facility] specialized interdisciplinary team will meet weekly to discuss patient overall medical status and rehab goals in preparation for D/C home  Inpatient acute rehab is recommended for patient to maximize overall strength, endurance, self care, and mobility for a safe and timely transition back home

## 2023-05-05 NOTE — CASE MANAGEMENT
Case Management Assessment & Discharge Planning Note    Patient name Omayra Rater  Location [id number] MRN [id number]  : 1965 Date 2023       Current Admission Date: 2023  Current Admission Diagnosis:Closed fracture of medial plateau of right tibia   Patient Active Problem List    Diagnosis Date Noted   • MANNY (obstructive sleep apnea) 2023   • Closed fracture of medial plateau of right tibia 2023   • Depression and anxiety 2023   • Acquired genu varum of right lower extremity 2023   • MARKELL (acute kidney injury) ([facility] ) 2023   • Chronic kidney disease-mineral and bone disorder 2023   • Closed fracture of right tibial plateau 18/10/8509   • Stage 3b chronic kidney disease ([facility] ) 11/15/2022   • Hyperkalemia 11/15/2022   • Alcoholism ([facility] ) 11/15/2022   • Dupuytren's contracture of right hand 2022   • Type 2 diabetes mellitus with diabetic peripheral angiopathy without gangrene ([facility] ) 2022   • Physical deconditioning 2021   • Acquired hypothyroidism 10/18/2021   • Acquired absence of right foot ([facility] ) 2021   • Persistent proteinuria 2021   • Abnormal EKG 2020   • Preoperative examination 2020   • Insomnia 2020   • Acute blood loss anemia 10/08/2020   • Hypomagnesemia 2020   • Urinary retention 2020   • Thyroid cancer ([facility] ) 2020   • PAD (peripheral artery disease) ([facility] ) 2020   • Hyponatremia 2020   • Health maintenance examination 2020   • Elevated liver enzymes 10/24/2019   • Colon polyps 2019   • Right-sided tinnitus 2018   • Hypertension, essential 2018   • Gastroesophageal reflux disease without esophagitis 2018   • Obesity, Class I, BMI 30 0-34 9 (see actual BMI) 2010   • Hyperlipidemia 2009   • History of nonadherence to medical treatment 2008      LOS (days): 4  Geometric Mean LOS (GMLOS) (days): 4 00  Days to GMLOS:0 3 OBJECTIVE:    Risk of Unplanned Readmission Score: 21 64         Current admission status: Inpatient       Preferred Pharmacy:   [facility], [address]  [place] [address]  Phone: [phone] Fax: [phone]    Paige Acosta , [facility] [address]  Phone: [phone] Fax: [phone]    Primary Care Provider: Kyle Cano MD    Primary Insurance: Florinda Miguel CROSS  Secondary Insurance:     ASSESSMENT:  Teresa Brito [address] Representative - Spouse   Primary Phone: [phone] [facility]  Home Phone: [phone]  Work Phone: [phone]               Advance Directives  Does patient have a [address]?: No  Was patient offered paperwork?: Yes (declined)  Does patient currently have a Health Care decision maker?: Yes, please see Health Care Proxy section  Does patient have Advance Directives?: No  Was patient offered paperwork?: Yes  Primary Contact: wife Dottie         Readmission Root Cause  30 Day Readmission: No    Patient Information  Admitted from[de-identified] Home  Mental Status: Alert  During Assessment patient was accompanied by: Not accompanied during assessment  Assessment information provided by[de-identified] Patient  Primary Caregiver: Family  Caregiver's Name[de-identified] Domenica Lou Relationship to Patient[de-identified] Family Member  Caregiver's Telephone Number[de-identified] on [facility]: Spouse/significant other  [place] of Residence: [facility] do you live in?:  [facility] [employer] entry access options   Select all that apply : Stairs  Number of steps to enter home : 3  Do the steps have railings?: Yes  Type of Current Residence: [place]  Upon entering residence, is there a bedroom on the main floor (no further steps)?: Yes  Upon entering residence, is there a bathroom on the main floor (no further steps)?: Yes  In the last 12 months, was there a time when you were not able to pay the mortgage or rent on time?: No  In the last 12 months, how many places have you lived?: 1  In the last 12 months, was there a time when you did not have a steady place to sleep or slept in a shelter (including now)?: No  Homeless/housing insecurity resource given?: No  Living Arrangements: Lives w/ Spouse/significant other  Is patient a ?: No    Activities of Daily Living Prior to Admission  Functional Status: Assistance  Completes ADLs independently?: No  Level of ADL dependence: Assistance  Ambulates independently?: No  Level of ambulatory dependence: Assistance  Does patient use assisted devices?: Yes  Assisted Devices (DME) used:  Wheelchair  Does patient currently own DME?: Yes  What DME does the patient currently own?: Wheelchair, [employer], Bedside [employer]  Does patient have a history of Outpatient Therapy (PT/OT)?: No  Does the patient have a history of Short-Term Rehab?: Yes ([facility])  Does patient have a history of HHC?: No  Does patient currently have [employer] ?: No         Patient Information Continued  Income Source: SSI/SSD  Does patient have prescription coverage?: Yes  Within the past 12 months, you worried that your food would run out before you got the money to buy more : Never true  Within the past 12 months, the food you bought just didn't last and you didn't have money to get more : Never true  Food insecurity resource given?: No  Does patient receive dialysis treatments?: No  Does patient have a history of substance abuse?: No  Does patient have a history of Mental Health Diagnosis?: No    PHQ 2/9 Screening   Reviewed PHQ 2/9 Depression Screening Score?: No    Means of Transportation  Means of Transport to Appts[de-identified] Family transport  In the past 12 months, has lack of transportation kept you from medical appointments or from getting medications?: No  In the past 12 months, has lack of transportation kept you from meetings, work, or from getting things needed for daily living?: No  Was application for public transport provided?: No        DISCHARGE DETAILS:    Discharge planning discussed with[de-identified] patient  Freedom of Choice: Yes  Comments - Freedom of Choice: Pt informs CM that insurance will cover 12 days at [facility] and he feels that that is not long enough to become functional   CM will place referrals to acute rehabs to see if pt meets the criteria for admission  CM contacted family/caregiver?: No- see comments (per pt request not to call his wife)  Were Treatment Team discharge recommendations reviewed with patient/caregiver?: Yes  Did patient/caregiver verbalize understanding of patient care needs?: Yes  Were patient/caregiver advised of the risks associated with not following Treatment Team discharge recommendations?: Yes    Contacts  Patient Contacts: Dottie  Relationship to Patient[de-identified] [address]         Is the patient interested in [facility] at discharge?: No    DME Referral Provided  Referral made for DME?: No    Other Referral/Resources/Interventions Provided:  Interventions: Acute Rehab, Short Term Rehab  Referral Comments: Acute rehab pended-pt informs CM that [facility] will accept, but insurance will only cover 12 more additional days    Pt does not feel he can become functional in 12 days with rehab at a nursing home    Would you like to participate in our [address] service program?  : No - Declined    Treatment Team Recommendation: Acute Rehab  Discharge Destination Plan[de-identified] Acute Rehab, Short Term Rehab  Transport at Discharge : [facility] Ambulance

## 2023-05-06 LAB
ANION GAP SERPL CALCULATED.3IONS-SCNC: 7 MMOL/L (ref 4–13)
BUN SERPL-MCNC: 21 MG/DL (ref 5–25)
CALCIUM SERPL-MCNC: 8.6 MG/DL (ref 8.4–10.2)
CHLORIDE SERPL-SCNC: 101 MMOL/L (ref 96–108)
CO2 SERPL-SCNC: 26 MMOL/L (ref 21–32)
CREAT SERPL-MCNC: 0.9 MG/DL (ref 0.6–1.3)
GFR SERPL CREATININE-BSD FRML MDRD: 92 ML/MIN/1.73SQ M
GLUCOSE SERPL-MCNC: 122 MG/DL (ref 65–140)
GLUCOSE SERPL-MCNC: 127 MG/DL (ref 65–140)
GLUCOSE SERPL-MCNC: 128 MG/DL (ref 65–140)
GLUCOSE SERPL-MCNC: 151 MG/DL (ref 65–140)
GLUCOSE SERPL-MCNC: 162 MG/DL (ref 65–140)
GLUCOSE SERPL-MCNC: 46 MG/DL (ref 65–140)
GLUCOSE SERPL-MCNC: 50 MG/DL (ref 65–140)
GLUCOSE SERPL-MCNC: 68 MG/DL (ref 65–140)
GLUCOSE SERPL-MCNC: 86 MG/DL (ref 65–140)
POTASSIUM SERPL-SCNC: 4 MMOL/L (ref 3.5–5.3)
SODIUM SERPL-SCNC: 134 MMOL/L (ref 135–147)

## 2023-05-06 RX ORDER — AMOXICILLIN 250 MG
1 CAPSULE ORAL DAILY
Status: DISCONTINUED | OUTPATIENT
Start: 2023-05-06 | End: 2023-05-10 | Stop reason: HOSPADM

## 2023-05-06 RX ORDER — LOSARTAN POTASSIUM 50 MG/1
50 TABLET ORAL DAILY
Refills: 0 | Status: ON HOLD
Start: 2023-05-07

## 2023-05-06 RX ORDER — DEXTROSE MONOHYDRATE 25 G/50ML
INJECTION, SOLUTION INTRAVENOUS
Status: DISPENSED
Start: 2023-05-06 | End: 2023-05-07

## 2023-05-06 RX ORDER — INSULIN GLARGINE 100 [IU]/ML
30 INJECTION, SOLUTION SUBCUTANEOUS
Qty: 10 ML | Refills: 0 | Status: ON HOLD | OUTPATIENT
Start: 2023-05-06

## 2023-05-06 RX ORDER — INSULIN LISPRO 100 [IU]/ML
20 INJECTION, SOLUTION INTRAVENOUS; SUBCUTANEOUS
Qty: 5 ML | Refills: 0 | Status: ON HOLD
Start: 2023-05-06

## 2023-05-06 RX ADMIN — INSULIN LISPRO 2 UNITS: 100 INJECTION, SOLUTION INTRAVENOUS; SUBCUTANEOUS at 11:50

## 2023-05-06 RX ADMIN — TAMSULOSIN HYDROCHLORIDE 0.4 MG: 0.4 CAPSULE ORAL at 16:53

## 2023-05-06 RX ADMIN — VERAPAMIL HYDROCHLORIDE 360 MG: 180 TABLET ORAL at 21:46

## 2023-05-06 RX ADMIN — INSULIN LISPRO 4 UNITS: 100 INJECTION, SOLUTION INTRAVENOUS; SUBCUTANEOUS at 08:15

## 2023-05-06 RX ADMIN — ASPIRIN 81 MG: 81 TABLET, CHEWABLE ORAL at 08:50

## 2023-05-06 RX ADMIN — CLOPIDOGREL BISULFATE 75 MG: 75 TABLET ORAL at 08:49

## 2023-05-06 RX ADMIN — SENNOSIDES AND DOCUSATE SODIUM 1 TABLET: 50; 8.6 TABLET ORAL at 05:29

## 2023-05-06 RX ADMIN — HYDROMORPHONE HYDROCHLORIDE 4 MG: 4 TABLET ORAL at 05:29

## 2023-05-06 RX ADMIN — ATORVASTATIN CALCIUM 40 MG: 40 TABLET, FILM COATED ORAL at 08:49

## 2023-05-06 RX ADMIN — INSULIN LISPRO 18 UNITS: 100 INJECTION, SOLUTION INTRAVENOUS; SUBCUTANEOUS at 08:15

## 2023-05-06 RX ADMIN — TRIMETHOBENZAMIDE HYDROCHLORIDE 200 MG: 100 INJECTION INTRAMUSCULAR at 21:46

## 2023-05-06 RX ADMIN — DOCUSATE SODIUM 100 MG: 100 CAPSULE, LIQUID FILLED ORAL at 08:50

## 2023-05-06 RX ADMIN — LOSARTAN POTASSIUM 50 MG: 50 TABLET, FILM COATED ORAL at 08:50

## 2023-05-06 RX ADMIN — LEVOTHYROXINE SODIUM 150 MCG: 150 TABLET ORAL at 08:49

## 2023-05-06 RX ADMIN — SODIUM CHLORIDE 200 MG: 900 INJECTION, SOLUTION INTRAVENOUS at 10:00

## 2023-05-06 RX ADMIN — ESCITALOPRAM OXALATE 10 MG: 10 TABLET, FILM COATED ORAL at 08:49

## 2023-05-06 RX ADMIN — INSULIN LISPRO 18 UNITS: 100 INJECTION, SOLUTION INTRAVENOUS; SUBCUTANEOUS at 11:52

## 2023-05-06 RX ADMIN — PANTOPRAZOLE SODIUM 40 MG: 40 TABLET, DELAYED RELEASE ORAL at 05:29

## 2023-05-06 RX ADMIN — ZOLPIDEM TARTRATE 10 MG: 5 TABLET, COATED ORAL at 21:46

## 2023-05-06 RX ADMIN — ENOXAPARIN SODIUM 40 MG: 40 INJECTION SUBCUTANEOUS at 05:25

## 2023-05-06 RX ADMIN — TRIMETHOBENZAMIDE HYDROCHLORIDE 200 MG: 100 INJECTION INTRAMUSCULAR at 05:26

## 2023-05-06 RX ADMIN — HYDROMORPHONE HYDROCHLORIDE 4 MG: 4 TABLET ORAL at 21:46

## 2023-05-06 RX ADMIN — DOCUSATE SODIUM 100 MG: 100 CAPSULE, LIQUID FILLED ORAL at 17:00

## 2023-05-06 NOTE — ASSESSMENT & PLAN NOTE
"/66   Pulse 70   Temp (!) 97 °F (36 1 °C)   Resp 16   Ht 6' 4\" (1 93 m)   Wt 122 kg (268 lb 15 4 oz)   SpO2 92%   BMI 32 74 kg/m²   · Stable pressures   Continue home meds except HCTZ  · Monitor BP  "

## 2023-05-06 NOTE — PLAN OF CARE
Problem: INFECTION - ADULT  Goal: Absence or prevention of progression during hospitalization  Description: INTERVENTIONS:  - Assess and monitor for signs and symptoms of infection  - Monitor lab/diagnostic results  - Monitor all insertion sites, i e  indwelling lines, tubes, and drains  - Monitor endotracheal if appropriate and nasal secretions for changes in amount and color  - [place] appropriate cooling/warming therapies per order  - Administer medications as ordered  - Instruct and encourage patient and family to use good hand hygiene technique  - Identify and instruct in appropriate isolation precautions for identified infection/condition  Outcome: Progressing  Goal: Absence of fever/infection during neutropenic period  Description: INTERVENTIONS:  - Monitor WBC    Outcome: Progressing

## 2023-05-06 NOTE — PROGRESS NOTES
NEPHROLOGY PROGRESS NOTE    Patient: Audry Prader               Sex: male          DOA: 5/1/2023  9:52 AM   YOB: 1965        Age:  59 y o         LOS:  LOS: 5 days   5/6/2023    REASON FOR THE CONSULTATION: Further management of CKD/hyponatremia    SUBJECTIVE     Seen and examined at the bedside today  He denies nausea, vomiting, shortness of breath  No complaints  Reviewed past 24 hour events      CURRENT MEDICATIONS       Current Facility-Administered Medications:   •  acetaminophen (TYLENOL) tablet 650 mg, 650 mg, Oral, Q8H PRN, Aleta Pace PA-C, 650 mg at 05/04/23 2110  •  aspirin chewable tablet 81 mg, 81 mg, Oral, Daily, Aleta Pace PA-C, 81 mg at 05/06/23 0850  •  atorvastatin (LIPITOR) tablet 40 mg, 40 mg, Oral, QAM, Aaron Wright PA-C, 40 mg at 05/06/23 6180  •  clopidogrel (PLAVIX) tablet 75 mg, 75 mg, Oral, QAM, Aaron Wright PA-C, 75 mg at 05/06/23 0849  •  docusate sodium (COLACE) capsule 100 mg, 100 mg, Oral, BID, Aaron Wright PA-C, 100 mg at 05/06/23 0850  •  enoxaparin (LOVENOX) subcutaneous injection 40 mg, 40 mg, Subcutaneous, Daily, Aleta Pace PA-C, 40 mg at 05/06/23 5561  •  escitalopram (LEXAPRO) tablet 10 mg, 10 mg, Oral, Daily, Aaron Wright PA-C, 10 mg at 05/06/23 5257  •  HYDROmorphone (DILAUDID) tablet 2 mg, 2 mg, Oral, Q6H PRN, Michelle Hare PA-C  •  HYDROmorphone (DILAUDID) tablet 4 mg, 4 mg, Oral, Q6H PRN, Ruth Ann Hargrove PA-C, 4 mg at 05/06/23 3336  •  HYDROmorphone HCl (DILAUDID) injection 0 2 mg, 0 2 mg, Intravenous, Q3H PRN, Michelle Hare PA-C, 0 2 mg at 05/02/23 0405  •  insulin glargine (LANTUS) subcutaneous injection 30 Units 0 3 mL, 30 Units, Subcutaneous, HS, Sri Calixto MD, 30 Units at 05/05/23 2148  •  insulin lispro (HumaLOG) 100 units/mL subcutaneous injection 18 Units, 18 Units, Subcutaneous, TID With Meals, Sri Calixto MD, 18 Units at 05/06/23 0815  •  insulin lispro (HumaLOG) 100 units/mL subcutaneous injection 2-12 Units, 2-12 Units, Subcutaneous, 4x Daily (AC & HS), 4 Units at 05/06/23 0815 **AND** Fingerstick Glucose (POCT), , , 4x Daily AC and at bedtime, Abelardo Tomas MD  •  levothyroxine tablet 150 mcg, 150 mcg, Oral, QAM, Aaron Wright PA-C, 150 mcg at 05/06/23 7479  •  losartan (COZAAR) tablet 50 mg, 50 mg, Oral, Daily, 50 mg at 05/06/23 0850 **AND** [DISCONTINUED] hydrochlorothiazide (HYDRODIURIL) tablet 12 5 mg, 12 5 mg, Oral, Daily, [place] NA[place]  •  pantoprazole (PROTONIX) EC tablet 40 mg, 40 mg, Oral, Early Morning, Aaron Wright PA-C, 40 mg at 05/06/23 7715  •  senna-docusate sodium (SENOKOT S) 8 6-50 mg per tablet 1 tablet, 1 tablet, Oral, Daily, Nisreen Connors PA-C, 1 tablet at 05/06/23 9421  •  tamsulosin (FLOMAX) capsule 0 4 mg, 0 4 mg, Oral, Daily With Dinner, Aleta Pace PA-C, 0 4 mg at 05/05/23 1843  •  trimethobenzamide (TIGAN) IM injection 200 mg, 200 mg, Intramuscular, Q6H PRN, NAILA Schrader, 200 mg at 05/06/23 0526  •  verapamil (CALAN-SR) CR tablet 360 mg, 360 mg, Oral, HS, Aaron Wright PA-C, 360 mg at 05/05/23 2148  •  zolpidem (AMBIEN) tablet 10 mg, 10 mg, Oral, HS PRN, Aleta Pace PA-C, 10 mg at 05/05/23 2147    REVIEW OF SYSTEMS     Review of Systems   Constitutional: Negative for activity change, chills, fatigue and fever  HENT: Negative for hearing loss, nosebleeds and trouble swallowing  Respiratory: Negative for cough and shortness of breath  Cardiovascular: Negative for chest pain and leg swelling  Gastrointestinal: Negative for constipation, diarrhea, nausea and vomiting  Genitourinary: Negative for difficulty urinating, dysuria, frequency and hematuria  Musculoskeletal: Negative for back pain  RLE pain   Skin: Negative for pallor  Neurological: Negative for dizziness, syncope, weakness and light-headedness  Psychiatric/Behavioral: Negative for sleep disturbance  The patient is not nervous/anxious  OBJECTIVE     Current Weight: Weight - Scale: 122 kg (268 lb 15 4 oz)  Vitals:    05/06/23 0815   BP: 118/66   Pulse:    Resp:    Temp: (!) 97 °F (36 1 °C)   SpO2:      Body mass index is 32 74 kg/m²  Intake/Output Summary (Last 24 hours) at 5/6/2023 1120  Last data filed at 5/5/2023 2001  Gross per 24 hour   Intake 360 ml   Output 500 ml   Net -140 ml       PHYSICAL EXAMINATION     Physical Exam  Vitals reviewed  Constitutional:       General: He is not in acute distress  HENT:      Head: Normocephalic  Mouth/Throat:      Lips: Pink  Mouth: Mucous membranes are moist    Eyes:      General: Lids are normal  No scleral icterus  Cardiovascular:      Rate and Rhythm: Normal rate and regular rhythm  Heart sounds: S1 normal and S2 normal  No murmur heard  Pulmonary:      Effort: Pulmonary effort is normal  No accessory muscle usage or respiratory distress  Abdominal:      General: There is no distension  Tenderness: There is no abdominal tenderness  Musculoskeletal:      Cervical back: Normal range of motion and neck supple  No tenderness  Comments: Ace wrap dressing and immobilizer to right lower extremity   Skin:     General: Skin is warm  Coloration: Skin is not cyanotic or jaundiced  Neurological:      General: No focal deficit present  Mental Status: He is alert and oriented to person, place, and time  Psychiatric:         Attention and Perception: Attention normal          Speech: Speech normal          Behavior: Behavior is cooperative            LAB RESULTS     Results from last 7 days   Lab Units 05/06/23  0518 05/05/23  1448 05/05/23  0922 05/04/23  1807 05/04/23  0813 05/03/23  2038 05/03/23  0459 05/02/23  0502 05/01/23  1703   WBC Thousand/uL  --  13 73*  --   --  16 19*  --  19 42* 13 40*  --    HEMOGLOBIN g/dL  --  7 9* 8 0* 8 1* 8 0* 8 0* 8 1* 9 1*  --    I STAT HEMOGLOBIN g/dl  --   --   --   --   --   --   -- --  10 2*   HEMATOCRIT %  --  23 9* 24 3* 24 8* 24 4* 24 4* 25 0* 27 9*  --    HEMATOCRIT, ISTAT %  --   --   --   --   --   --   --   --  30*   PLATELETS Thousands/uL  --  293  --   --  263  --  260 288  --    SODIUM mmol/L 134*  --  132* 131* 132*  --  129* 134*  --    POTASSIUM mmol/L 4 0  --  3 6 3 4* 3 6  --  3 9 4 0  --    CHLORIDE mmol/L 101  --  100 100 100  --  99 102  --    CO2 mmol/L 26  --  25 24 24  --  24 22  --    CO2, I-STAT mmol/L  --   --   --   --   --   --   --   --  25   BUN mg/dL 21  --  19 16 19  --  22 20  --    CREATININE mg/dL 0 90  --  0 75 0 68 0 75  --  0 90 1 04  --    EGFR ml/min/1 73sq m 92  --  99 103 99  --  92 77  --    CALCIUM mg/dL 8 6  --  8 6 8 5 8 5  --  8 3* 8 8  --    GLUCOSE, ISTAT mg/dl  --   --   --   --   --   --   --   --  109       RADIOLOGY RESULTS      No results found for this or any previous visit  Results for orders placed during the hospital encounter of 03/03/23    XR chest 2 views    Narrative  CHEST    INDICATION:   diminished L lung sounds  COMPARISON:  11/15/2018    EXAM PERFORMED/VIEWS:  XR CHEST PA & LATERAL      FINDINGS:    Marked patient rotation  Cardiomediastinal silhouette appears unremarkable  The lungs are clear  No pneumothorax or pleural effusion  Osseous structures appear within normal limits for patient age  Impression  No acute cardiopulmonary disease              Workstation performed: [id number]      ASSESSMENT/PLAN     24-year-old male with a past medical history of CKD stage III, hypertension, type 2 diabetes, peripheral arterial disease, and prior Goprelto fracture of the right tibia status post repair      Chronic kidney disease stage:  After review of the medical record, it appears that baseline creatinine fluctuates from 0 9-1 1   He did have prior episodes of acute kidney injury in the past, but was admitted for procedure with creatinine level within suspected baseline   Etiology of CKD likely secondary to "longstanding history of hypertension and diabetes      Creatinine remains stable at 0 90      Hypertension:  Currently maintained on losartan 50 mg once daily and verapamil 360 mg once daily  Pressure readings acceptable over the past 24 hours      Hyponatremia:  Urine osmolality 646, urine sodium 50, indicative of SIADH etiology   Suspect multifactorial in the setting of setting of pain, IVF, and use of HCTZ     Following discontinuation of HCTZ, sodium levels improving currently maintained at 134  Recommend discontinuation of hydrochlorothiazide  Recommend monitoring daily BMP while hospitalized      Disposition: Patient is okay for hospital discharge from a nephrology standpoint with discontinuation of hydrochlorothiazide  Recommend he get a BMP within 1 week of discharge  Nothing further to add from nephrology standpoint and we will sign off at this time  Bob Howe, [facility]  Nephrology  5/6/2023      Portions of the record may have been created with voice recognition software  Occasional wrong word or \"sound a like\" substitutions may have occurred due to the inherent limitations of voice recognition software  Read the chart carefully and recognize, using context, where substitutions have occurred    "

## 2023-05-06 NOTE — ASSESSMENT & PLAN NOTE
Chronic, subpar control, as evidenced by a hemoglobin A1C of 7 9  Home regimen includes: 45 units Lantus at bedtime with Apidra 30 units TID with meals  Continue  insulin sliding scale in addition to 30 units Lantus at bedtime and increase mealtime insulin to 20-20-20 at discharge  Monitor blood glucose AC/HS  Hypoglycemia protocol

## 2023-05-06 NOTE — PROGRESS NOTES
Progress Note - Orthopedics   Jazzmine Mcgill 59 y o  male MRN: [id number]  Unit/Bed#: -01      Subjective:    59 y o male POD#5 s/p ORIF Right tibial plateau nonunion  Patient is resting comfortably in hospital bed this morning  Patient states pain in right lower extremity is 9/10 when he is awake  Patient denies any numbness or tingling in right lower extremity  Patient has maintained nonweightbearing status with knee immobilizer  Patient denies any fevers or chills  Patient denies any shortness of breath, chest pain, lightheadedness, dizziness, nausea, and vomiting  Patient offers no additional complaints      Labs:  0   Lab Value Date/Time    HCT 23 9 (L) 05/05/2023 1448    HCT 24 3 (L) 05/05/2023 0922    HCT 24 8 (L) 05/04/2023 1807    HGB 7 9 (L) 05/05/2023 1448    HGB 8 0 (L) 05/05/2023 0922    HGB 8 1 (L) 05/04/2023 1807    INR 1 04 08/04/2020 1358    WBC 13 73 (H) 05/05/2023 1448    WBC 16 19 (H) 05/04/2023 0813    WBC 19 42 (H) 05/03/2023 0459    ESR 24 (H) 02/21/2020 1101       Meds:    Current Facility-Administered Medications:   •  acetaminophen (TYLENOL) tablet 650 mg, 650 mg, Oral, Q8H PRN, Konstantin Lazcano PA-C, 650 mg at 05/04/23 2110  •  aspirin chewable tablet 81 mg, 81 mg, Oral, Daily, Konstantin Lazcano PA-C, 81 mg at 05/05/23 1057  •  atorvastatin (LIPITOR) tablet 40 mg, 40 mg, Oral, QAM, Aaron Wright PA-C, 40 mg at 05/05/23 1057  •  clopidogrel (PLAVIX) tablet 75 mg, 75 mg, Oral, QAM, Aaron Wright PA-C, 75 mg at 05/05/23 1056  •  docusate sodium (COLACE) capsule 100 mg, 100 mg, Oral, BID, Aaron Wright PA-C, 100 mg at 05/05/23 1723  •  enoxaparin (LOVENOX) subcutaneous injection 40 mg, 40 mg, Subcutaneous, Daily, Aaron Wright PA-C, 40 mg at 05/06/23 8678  •  escitalopram (LEXAPRO) tablet 10 mg, 10 mg, Oral, Daily, Aaron Wright PA-C, 10 mg at 05/05/23 1057  •  HYDROmorphone (DILAUDID) tablet 2 mg, 2 mg, Oral, Q6H PRN, Ashley Hilario PA-C  • HYDROmorphone (DILAUDID) tablet 4 mg, 4 mg, Oral, Q6H PRN, Ruth Ann Hargrove PA-C, 4 mg at 05/06/23 5244  •  HYDROmorphone HCl (DILAUDID) injection 0 2 mg, 0 2 mg, Intravenous, Q3H PRN, Michelle Hare PA-C, 0 2 mg at 05/02/23 0405  •  insulin glargine (LANTUS) subcutaneous injection 30 Units 0 3 mL, 30 Units, Subcutaneous, HS, Sri Calixto MD, 30 Units at 05/05/23 2148  •  insulin lispro (HumaLOG) 100 units/mL subcutaneous injection 18 Units, 18 Units, Subcutaneous, TID With Meals, Sri Calixto MD, 18 Units at 05/05/23 1647  •  insulin lispro (HumaLOG) 100 units/mL subcutaneous injection 2-12 Units, 2-12 Units, Subcutaneous, 4x Daily (AC & HS), 2 Units at 05/05/23 2148 **AND** Fingerstick Glucose (POCT), , , 4x Daily AC and at bedtime, Abelardo Tomas MD  •  iron sucrose (VENOFER) 200 mg in sodium chloride 0 9 % 100 mL IVPB, 200 mg, Intravenous, Daily, Sri Calixto MD, Last Rate: 100 mL/hr at 05/04/23 0933, 200 mg at 05/05/23 1037  •  levothyroxine tablet 150 mcg, 150 mcg, Oral, QAM, Aaron Wright PA-C, 150 mcg at 05/05/23 1056  •  losartan (COZAAR) tablet 50 mg, 50 mg, Oral, Daily, 50 mg at 05/05/23 1056 **AND** [DISCONTINUED] hydrochlorothiazide (HYDRODIURIL) tablet 12 5 mg, 12 5 mg, Oral, Daily, [place] NAILA Villa  •  pantoprazole (PROTONIX) EC tablet 40 mg, 40 mg, Oral, Early Morning, Aaron Wright PA-C, 40 mg at 05/06/23 1443  •  senna-docusate sodium (SENOKOT S) 8 6-50 mg per tablet 1 tablet, 1 tablet, Oral, Daily, Nisreen Connors PA-C, 1 tablet at 05/06/23 7646  •  tamsulosin (FLOMAX) capsule 0 4 mg, 0 4 mg, Oral, Daily With Dinner, Aleta Pace PA-C, 0 4 mg at 05/05/23 1843  •  trimethobenzamide (TIGAN) IM injection 200 mg, 200 mg, Intramuscular, Q6H PRN, NAILA Schrader, 200 mg at 05/06/23 0531  •  verapamil (CALAN-SR) CR tablet 360 mg, 360 mg, Oral, HS, Aaron Wright PA-C, 360 mg at 05/05/23 2149  •  zolpidem (AMBIEN) tablet 10 mg, 10 mg, Oral, HS CHELSIE, Minerva Welch PA-C, 10 mg at 05/05/23 2147    Blood Culture:   Lab Results   Component Value Date    BLOODCX No Growth After 5 Days  10/10/2020    BLOODCX No Growth After 5 Days  10/10/2020       Wound Culture:   Lab Results   Component Value Date    WOUNDCULT 2+ Growth of Morganella morganii (A) 08/30/2020    WOUNDCULT (A) 08/30/2020     2+ Growth of Methicillin Resistant Staphylococcus aureus    WOUNDCULT 1+ Growth of Enterobacter cloacae complex (A) 08/30/2020    WOUNDCULT 1+ Growth of Enterococcus faecalis (A) 08/30/2020       Ins and Outs:  I/O last 24 hours: In: 600 [P O :600]  Out: 850 [Urine:850]          Physical:  Vitals:    05/05/23 2151   BP: 122/71   Pulse: 70   Resp: 16   Temp: 99 8 °F (37 7 °C)   SpO2: 92%     Musculoskeletal:  Right lower extremity  • Patient is a 23-year-old male laying comfortably in hospital bed in no acute distress  • Knee immobilizer intact upon evaluation  • Bulky dressing clean, dry and intact without evidence of strike through  • TTP throughout Right lower extremity  • Sensation intact to saphenous, sural, tibial, superficial peroneal nerve, and deep peroneal  • Motor intact to ankle dorsi/plantar flexion  • 2+ DP pulse, symmetric bilaterally  • Extremity warm and well perfused    Assessment:    58 y o male POD#5 s/p ORIF Right tibial plateau nonunion    Plan:  • Nonweightbearing Right lower extremity with knee immobilizer intact at all times   • Will monitor for ABLA and administer IVF/prbc as indicated for Greater than 2 gram drop or Hgb < 7  Hgb of 7 9 yesterday  • PT/OT- up and out of bed, gait training while maintaining NWB status Right lower extremity  • Pain control  • DVT ppx- Lovenox 40mg once daily + Plavix   • Dispo: Patient is now medically stable per SLIM  Awaiting authorization for rehab facility at this time  See above for additional details       Nelson Moran PA-C

## 2023-05-06 NOTE — CASE MANAGEMENT
[place] [address] 48 received request for authorization from Care Manager    Authorization request for: Acute Rehab  Facility Name[facility]   BOI:[id number]  Facility MD:Dr Lantigua Nurse     HDS:[phone]   Authorization initiated by faxing request with clinicals to [phone]  Pending Reference #:N/A

## 2023-05-06 NOTE — ASSESSMENT & PLAN NOTE
· Patient with a hemoglobin 11 4 with acute drop to 8 after surgery  · Hemoglobin remains stable, around 8  · Transfuse for <7  · Iron panel collected; noted to have iron deficiency anemia - receiving IV Venofer  Ok to DC at discharge   Will need OP hematology referral

## 2023-05-06 NOTE — PROGRESS NOTES
[facility]  Progress Note  Name: Leann Mendoza  MRN: [id number]  Unit/Bed#: -01 I Date of Admission: 5/1/2023   Date of Service: 5/6/2023 I Hospital Day: 5    Assessment/Plan   * Closed fracture of medial plateau of right tibia  Assessment & Plan  · S/p reconstruction of proximal tibia today however may require cement spacer as well as possible external fixator placement  · Pain management per primary team  Ortho primary  · Pain is uncontrolled as per the patient today  Patient does not want Percocet at discharge  He is willing to try 2 days of p o  Dilaudid at discharge  IV Dilaudid has been making him very nauseous and we have been giving him known QT prolonging antinausea agents like Tigan and Zofran when QTc has been less than 500    Hyponatremia  Assessment & Plan  · Improved to 134   Hydrochlorothiazide has been discontinued  · Multifactorial in setting of IVF/Hctz and pain  · Nephrology following, appreciate input    MANNY (obstructive sleep apnea)  Assessment & Plan  · Continue CPAP, patient reported he is not compliant  · Importance of compliance with regimen    Stage 3b chronic kidney disease ([facility] [address] )  Assessment & Plan  · Baseline appears to be 0 9-1 1  · Stable  · Avoid nephrotoxins; hypotension  · Nephrology on board  · Monitor BMP    Type 2 diabetes mellitus with diabetic peripheral angiopathy without gangrene ([facility] [address] )  Assessment & Plan  Chronic, subpar control, as evidenced by a hemoglobin A1C of 7 9  Home regimen includes: 45 units Lantus at bedtime with Apidra 30 units TID with meals  Continue  insulin sliding scale in addition to 30 units Lantus at bedtime and increase mealtime insulin to 20-20-20 at discharge  Monitor blood glucose AC/HS  Hypoglycemia protocol    Acute blood loss anemia  Assessment & Plan  · Patient with a hemoglobin 11 4 with acute drop to 8 after surgery  · Hemoglobin remains stable, around 8  · Transfuse for <7  · Iron panel collected; noted to "have iron deficiency anemia - receiving IV Venofer  Ok to DC at discharge  Will need OP hematology referral    Hyperlipidemia  Assessment & Plan  · Continue home Lipitor    Hypertension, essential  Assessment & Plan  /66   Pulse 70   Temp (!) 97 °F (36 1 °C)   Resp 16   Ht 6' 4\" (1 93 m)   Wt 122 kg (268 lb 15 4 oz)   SpO2 92%   BMI 32 74 kg/m²   · Stable pressures  Continue home meds except HCTZ  · Monitor BP               VTE Pharmacologic Prophylaxis:   lovenox    Patient Centered Rounds: I performed bedside rounds with nursing staff today  Discussions with Specialists or Other Care Team Provider: yes nephrology    Education and Discussions with Family / Patient: pt has been updated  Total Time Spent on Date of Encounter in care of patient: 25 minutes This time was spent on one or more of the following: performing physical exam; counseling and coordination of care; obtaining or reviewing history; documenting in the medical record; reviewing/ordering tests, medications or procedures; communicating with other healthcare professionals and discussing with patient's family/caregivers  Current Length of Stay: 5 day(s)  Current Patient Status: Inpatient   Certification Statement: cleared for DC from medical service  Discharge Plan: [facility] is following this patient on consult  They are medically stable for discharge when deemed appropriate by primary service  Code Status: Level 1 - Full Code    Subjective:   Seen and examined at bedside  Pain better controlled   sodium improved    Objective:     Vitals:   Temp (24hrs), Av 4 °F (36 9 °C), Min:97 °F (36 1 °C), Max:99 8 °F (37 7 °C)    Temp:  [97 °F (36 1 °C)-99 8 °F (37 7 °C)] 97 °F (36 1 °C)  HR:  [70-72] 70  Resp:  [16] 16  BP: (118-129)/(66-71) 118/66  SpO2:  [92 %] 92 %  Body mass index is 32 74 kg/m²  Input and Output Summary (last 24 hours):      Intake/Output Summary (Last 24 hours) at 2023 1150  Last data filed at 2023 " 2001  Gross per 24 hour   Intake 360 ml   Output 500 ml   Net -140 ml       Physical Exam:   Physical Exam General- Awake, alert and oriented x 3, looks comfortable  HEENT- Normocephalic, atraumatic, oral mucosa- moist  Neck- Supple, No carotid bruit, no JVD  CVS- Normal S1/ S2, Regular rate and rhythm, No murmur, No edema  Respiratory system- B/L clear breath sounds, no wheezing  Abdomen- Soft, Non distended, no tenderness, Bowel sound- present 4 quads  Genitourinary- No suprapubic tenderness, No CVA tenderness  Skin- No new bruise or rash  Musculoskeletal- right LE in brace  Psych- No acute psychosis  CNS- CN II- XII grossly intact, No acute focal neurologic deficit noted      Additional Data:     Labs:  Results from last 7 days   Lab Units 05/05/23  1448 05/04/23  1807 05/04/23  0813   WBC Thousand/uL 13 73*  --  16 19*   HEMOGLOBIN g/dL 7 9*   < > 8 0*   HEMATOCRIT % 23 9*   < > 24 4*   PLATELETS Thousands/uL 293  --  263   NEUTROS PCT %  --   --  82*   LYMPHS PCT %  --   --  7*   MONOS PCT %  --   --  10   EOS PCT %  --   --  0    < > = values in this interval not displayed       Results from last 7 days   Lab Units 05/06/23  0518 05/05/23  0922   SODIUM mmol/L 134* 132*   POTASSIUM mmol/L 4 0 3 6   CHLORIDE mmol/L 101 100   CO2 mmol/L 26 25   BUN mg/dL 21 19   CREATININE mg/dL 0 90 0 75   ANION GAP mmol/L 7 7   CALCIUM mg/dL 8 6 8 6   ALBUMIN g/dL  --  3 0*   TOTAL BILIRUBIN mg/dL  --  0 76   ALK PHOS U/L  --  95   ALT U/L  --  5*   AST U/L  --  9*   GLUCOSE RANDOM mg/dL 127 210*         Results from last 7 days   Lab Units 05/06/23  1059 05/06/23  0813 05/05/23  2039 05/05/23  1638 05/05/23  1122 05/05/23  0721 05/04/23  2048 05/04/23  1617 05/04/23  1104 05/04/23  0726 05/03/23  2100 05/03/23  1612   POC GLUCOSE mg/dl 162* 151* 154* 95 223* 154* 163* 169* 194* 161* 141* 142*               Lines/Drains:  Invasive Devices     Peripheral Intravenous Line  Duration           Peripheral IV 05/06/23 Left;Ventral (anterior) Forearm <1 day                      Imaging: No pertinent imaging reviewed  Recent Cultures (last 7 days):   Results from last 7 days   Lab Units 05/01/23  1358   GRAM STAIN RESULT  No Polys or Bacteria seen       Last 24 Hours Medication List:   Current Facility-Administered Medications   Medication Dose Route Frequency Provider Last Rate   • acetaminophen  650 mg Oral Q8H PRN Day Russo PA-C     • aspirin  81 mg Oral Daily Day Russo PA-C     • atorvastatin  40 mg Oral QAM Day Russo PA-C     • clopidogrel  75 mg Oral QAM Aaron Wright PA-C     • docusate sodium  100 mg Oral BID Day Russo PA-C     • enoxaparin  40 mg Subcutaneous Daily Day Russo PA-C     • escitalopram  10 mg Oral Daily Day Russo PA-C     • HYDROmorphone  2 mg Oral Q6H PRN Roxane Lentz PA-C     • HYDROmorphone  4 mg Oral Q6H PRN Roxane Lentz PA-C     • HYDROmorphone  0 2 mg Intravenous Q3H PRN Roxane Lentz PA-C     • insulin glargine  30 Units Subcutaneous HS Lillie Carver MD     • insulin lispro  18 Units Subcutaneous TID With Meals Lillie Carver MD     • insulin lispro  2-12 Units Subcutaneous 4x Daily (AC & HS) Beka Yanez MD     • levothyroxine  150 mcg Oral QAM Aaron Wright PA-C     • losartan  50 mg Oral Daily NeelJudsonia NAILA Villa     • pantoprazole  40 mg Oral Early Morning Day Russo PA-C     • senna-docusate sodium  1 tablet Oral Daily Nisreen Villafana PA-C     • tamsulosin  0 4 mg Oral Daily With Je Wright PA-C     • trimethobenzamide  200 mg Intramuscular Q6H PRN NAILA Rivera     • verapamil  360 mg Oral HS Aaron Wright PA-C     • zolpidem  10 mg Oral HS PRN Day Russo PA-C          Today, Patient Was Seen By: Lillie Carver MD    **Please Note: This note may have been constructed using a voice recognition system  **

## 2023-05-06 NOTE — ASSESSMENT & PLAN NOTE
· Improved to 134   Hydrochlorothiazide has been discontinued  · Multifactorial in setting of IVF/Hctz and pain  · Nephrology following, appreciate input

## 2023-05-07 LAB
ANION GAP SERPL CALCULATED.3IONS-SCNC: 6 MMOL/L (ref 4–13)
BASOPHILS # BLD AUTO: 0.06 THOUSANDS/ÂΜL (ref 0–0.1)
BASOPHILS NFR BLD AUTO: 1 % (ref 0–1)
BUN SERPL-MCNC: 17 MG/DL (ref 5–25)
CALCIUM SERPL-MCNC: 8.4 MG/DL (ref 8.4–10.2)
CHLORIDE SERPL-SCNC: 101 MMOL/L (ref 96–108)
CO2 SERPL-SCNC: 26 MMOL/L (ref 21–32)
CREAT SERPL-MCNC: 0.68 MG/DL (ref 0.6–1.3)
EOSINOPHIL # BLD AUTO: 0.2 THOUSAND/ÂΜL (ref 0–0.61)
EOSINOPHIL NFR BLD AUTO: 2 % (ref 0–6)
ERYTHROCYTE [DISTWIDTH] IN BLOOD BY AUTOMATED COUNT: 13.2 % (ref 11.6–15.1)
GFR SERPL CREATININE-BSD FRML MDRD: 103 ML/MIN/1.73SQ M
GLUCOSE SERPL-MCNC: 149 MG/DL (ref 65–140)
GLUCOSE SERPL-MCNC: 164 MG/DL (ref 65–140)
GLUCOSE SERPL-MCNC: 191 MG/DL (ref 65–140)
GLUCOSE SERPL-MCNC: 229 MG/DL (ref 65–140)
GLUCOSE SERPL-MCNC: 240 MG/DL (ref 65–140)
GLUCOSE SERPL-MCNC: 91 MG/DL (ref 65–140)
HCT VFR BLD AUTO: 25.7 % (ref 36.5–49.3)
HGB BLD-MCNC: 8.3 G/DL (ref 12–17)
IMM GRANULOCYTES # BLD AUTO: 0.11 THOUSAND/UL (ref 0–0.2)
IMM GRANULOCYTES NFR BLD AUTO: 1 % (ref 0–2)
LYMPHOCYTES # BLD AUTO: 1.77 THOUSANDS/ÂΜL (ref 0.6–4.47)
LYMPHOCYTES NFR BLD AUTO: 16 % (ref 14–44)
MCH RBC QN AUTO: 29.4 PG (ref 26.8–34.3)
MCHC RBC AUTO-ENTMCNC: 32.3 G/DL (ref 31.4–37.4)
MCV RBC AUTO: 91 FL (ref 82–98)
MONOCYTES # BLD AUTO: 1.51 THOUSAND/ÂΜL (ref 0.17–1.22)
MONOCYTES NFR BLD AUTO: 14 % (ref 4–12)
NEUTROPHILS # BLD AUTO: 7.12 THOUSANDS/ÂΜL (ref 1.85–7.62)
NEUTS SEG NFR BLD AUTO: 66 % (ref 43–75)
NRBC BLD AUTO-RTO: 0 /100 WBCS
PLATELET # BLD AUTO: 346 THOUSANDS/UL (ref 149–390)
PMV BLD AUTO: 10.3 FL (ref 8.9–12.7)
POTASSIUM SERPL-SCNC: 3.8 MMOL/L (ref 3.5–5.3)
RBC # BLD AUTO: 2.82 MILLION/UL (ref 3.88–5.62)
SODIUM SERPL-SCNC: 133 MMOL/L (ref 135–147)
WBC # BLD AUTO: 10.77 THOUSAND/UL (ref 4.31–10.16)

## 2023-05-07 RX ORDER — INSULIN LISPRO 100 [IU]/ML
15 INJECTION, SOLUTION INTRAVENOUS; SUBCUTANEOUS
Status: DISCONTINUED | OUTPATIENT
Start: 2023-05-07 | End: 2023-05-10 | Stop reason: HOSPADM

## 2023-05-07 RX ORDER — INSULIN GLARGINE 100 [IU]/ML
20 INJECTION, SOLUTION SUBCUTANEOUS
Status: DISCONTINUED | OUTPATIENT
Start: 2023-05-07 | End: 2023-05-10 | Stop reason: HOSPADM

## 2023-05-07 RX ADMIN — INSULIN LISPRO 15 UNITS: 100 INJECTION, SOLUTION INTRAVENOUS; SUBCUTANEOUS at 17:20

## 2023-05-07 RX ADMIN — DOCUSATE SODIUM 100 MG: 100 CAPSULE, LIQUID FILLED ORAL at 17:22

## 2023-05-07 RX ADMIN — SENNOSIDES AND DOCUSATE SODIUM 1 TABLET: 50; 8.6 TABLET ORAL at 08:26

## 2023-05-07 RX ADMIN — ZOLPIDEM TARTRATE 10 MG: 5 TABLET, COATED ORAL at 21:05

## 2023-05-07 RX ADMIN — INSULIN LISPRO 15 UNITS: 100 INJECTION, SOLUTION INTRAVENOUS; SUBCUTANEOUS at 13:44

## 2023-05-07 RX ADMIN — DOCUSATE SODIUM 100 MG: 100 CAPSULE, LIQUID FILLED ORAL at 08:26

## 2023-05-07 RX ADMIN — ASPIRIN 81 MG: 81 TABLET, CHEWABLE ORAL at 08:27

## 2023-05-07 RX ADMIN — TAMSULOSIN HYDROCHLORIDE 0.4 MG: 0.4 CAPSULE ORAL at 17:22

## 2023-05-07 RX ADMIN — INSULIN LISPRO 2 UNITS: 100 INJECTION, SOLUTION INTRAVENOUS; SUBCUTANEOUS at 08:08

## 2023-05-07 RX ADMIN — HYDROMORPHONE HYDROCHLORIDE 4 MG: 4 TABLET ORAL at 19:19

## 2023-05-07 RX ADMIN — PANTOPRAZOLE SODIUM 40 MG: 40 TABLET, DELAYED RELEASE ORAL at 05:40

## 2023-05-07 RX ADMIN — INSULIN GLARGINE 20 UNITS: 100 INJECTION, SOLUTION SUBCUTANEOUS at 21:20

## 2023-05-07 RX ADMIN — ESCITALOPRAM OXALATE 10 MG: 10 TABLET, FILM COATED ORAL at 08:26

## 2023-05-07 RX ADMIN — INSULIN LISPRO 2 UNITS: 100 INJECTION, SOLUTION INTRAVENOUS; SUBCUTANEOUS at 17:00

## 2023-05-07 RX ADMIN — LEVOTHYROXINE SODIUM 150 MCG: 150 TABLET ORAL at 08:26

## 2023-05-07 RX ADMIN — ATORVASTATIN CALCIUM 40 MG: 40 TABLET, FILM COATED ORAL at 08:26

## 2023-05-07 RX ADMIN — INSULIN LISPRO 4 UNITS: 100 INJECTION, SOLUTION INTRAVENOUS; SUBCUTANEOUS at 13:43

## 2023-05-07 RX ADMIN — ENOXAPARIN SODIUM 40 MG: 40 INJECTION SUBCUTANEOUS at 05:40

## 2023-05-07 RX ADMIN — VERAPAMIL HYDROCHLORIDE 360 MG: 180 TABLET ORAL at 21:05

## 2023-05-07 RX ADMIN — CLOPIDOGREL BISULFATE 75 MG: 75 TABLET ORAL at 08:27

## 2023-05-07 RX ADMIN — TRIMETHOBENZAMIDE HYDROCHLORIDE 200 MG: 100 INJECTION INTRAMUSCULAR at 17:23

## 2023-05-07 NOTE — ASSESSMENT & PLAN NOTE
Chronic, subpar control, as evidenced by a hemoglobin A1C of 7 9  Home regimen includes: 45 units Lantus at bedtime with Apidra 30 units TID with meals  Changes made to the regimen as pt had hypoglycemia episode, monitor closely (reduced from 30U to 20U of lantus and 15 premeals from 18U)

## 2023-05-07 NOTE — PLAN OF CARE
Problem: PAIN - ADULT  Goal: Verbalizes/displays adequate comfort level or baseline comfort level  Description: Interventions:  - Encourage patient to monitor pain and request assistance  - Assess pain using appropriate pain scale  - Administer analgesics based on type and severity of pain and evaluate response  - Implement non-pharmacological measures as appropriate and evaluate response  - Consider cultural and social influences on pain and pain management  - Notify physician/advanced practitioner if interventions unsuccessful or patient reports new pain  Outcome: Progressing       Problem: INFECTION - ADULT  Goal: Absence or prevention of progression during hospitalization  Description: INTERVENTIONS:  - Assess and monitor for signs and symptoms of infection  - Monitor lab/diagnostic results  - Monitor all insertion sites, i e  indwelling lines, tubes, and drains  - Monitor endotracheal if appropriate and nasal secretions for changes in amount and color  - [place] appropriate cooling/warming therapies per order  - Administer medications as ordered  - Instruct and encourage patient and family to use good hand hygiene technique  - Identify and instruct in appropriate isolation precautions for identified infection/condition  Outcome: Progressing  Goal: Absence of fever/infection during neutropenic period  Description: INTERVENTIONS:  - Monitor WBC    Outcome: Progressing

## 2023-05-07 NOTE — ASSESSMENT & PLAN NOTE
· Stable around 133-134   Hydrochlorothiazide has been discontinued  · Multifactorial in setting of IVF/Hctz and pain  · Nephrology following, appreciate input

## 2023-05-07 NOTE — ASSESSMENT & PLAN NOTE
"/63   Pulse 70   Temp 97 7 °F (36 5 °C)   Resp 16   Ht 6' 4\" (1 93 m)   Wt 122 kg (268 lb 15 4 oz)   SpO2 92%   BMI 32 74 kg/m²   · Stable pressures   Continue home meds except HCTZ  · Monitor BP  " No

## 2023-05-07 NOTE — PROGRESS NOTES
"[address]  Progress Note  Name: Adama Aguilera  MRN: [id number]  Unit/Bed#: -01 I Date of Admission: 5/1/2023   Date of Service: 5/7/2023 I Hospital Day: 6    Assessment/Plan   * Closed fracture of medial plateau of right tibia  Assessment & Plan  · S/p reconstruction of proximal tibia today however may require cement spacer as well as possible external fixator placement  · Pain management per primary team  Ortho primary  · Pain better controlled with PO dilaudid now    Hyponatremia  Assessment & Plan  · Stable around 133-134  Hydrochlorothiazide has been discontinued  · Multifactorial in setting of IVF/Hctz and pain  · Nephrology following, appreciate input    MANNY (obstructive sleep apnea)  Assessment & Plan  · Continue CPAP, patient reported he is not compliant  · Importance of compliance with regimen    Stage 3b chronic kidney disease ([facility] [address] )  Assessment & Plan  · Baseline appears to be 0 9-1 1  · Stable  · Avoid nephrotoxins; hypotension  · Nephrology on board  · Monitor BMP    Type 2 diabetes mellitus with diabetic peripheral angiopathy without gangrene ([facility] [address] )  Assessment & Plan  Chronic, subpar control, as evidenced by a hemoglobin A1C of 7 9  Home regimen includes: 45 units Lantus at bedtime with Apidra 30 units TID with meals  Changes made to the regimen as pt had hypoglycemia episode, monitor closely (reduced from 30U to 20U of lantus and 15 premeals from 18U)    Acute blood loss anemia  Assessment & Plan  · Patient with a hemoglobin 11 4 with acute drop to 8 after surgery  · Hemoglobin remains stable, 8 3  · Transfuse for <7  · Iron panel collected; noted to have iron deficiency anemia - receiving IV Venofer  Ok to DC at discharge  Will need OP hematology referral    Hypertension, essential  Assessment & Plan  /63   Pulse 70   Temp 97 7 °F (36 5 °C)   Resp 16   Ht 6' 4\" (1 93 m)   Wt 122 kg (268 lb 15 4 oz)   SpO2 92%   BMI 32 74 kg/m²   · Stable pressures   " Continue home meds except HCTZ  · Monitor BP               VTE Pharmacologic Prophylaxis:   lovenox    Patient Centered Rounds: I performed bedside rounds with nursing staff today  Discussions with Specialists or Other Care Team Provider: n/a    Education and Discussions with Family / Patient: pt has been kept  updated and he has been wanting to update his family  Total Time Spent on Date of Encounter in care of patient: 25 minutes This time was spent on one or more of the following: performing physical exam; counseling and coordination of care; obtaining or reviewing history; documenting in the medical record; reviewing/ordering tests, medications or procedures; communicating with other healthcare professionals and discussing with patient's family/caregivers  Current Length of Stay: 6 day(s)  Current Patient Status: Inpatient   Certification Statement: The patient will continue to require additional inpatient hospital stay due to placement   Discharge Plan: [facility] is following this patient on consult  They are medically stable for discharge when deemed appropriate by primary service  Code Status: Level 1 - Full Code    Subjective:   Seen and examined at bedside  No new complaints  Episode of hypoglycemia- insulin changes made, discussed with RN to monitor BG closely as ordered    Objective:     Vitals:   Temp (24hrs), Av 2 °F (36 8 °C), Min:97 7 °F (36 5 °C), Max:98 9 °F (37 2 °C)    Temp:  [97 7 °F (36 5 °C)-98 9 °F (37 2 °C)] 97 7 °F (36 5 °C)  HR:  [70] 70  Resp:  [16] 16  BP: (107-141)/(63-74) 107/63  Body mass index is 32 74 kg/m²  Input and Output Summary (last 24 hours):      Intake/Output Summary (Last 24 hours) at 2023 1015  Last data filed at 2023 0501  Gross per 24 hour   Intake --   Output 625 ml   Net -625 ml       Physical Exam:   Physical Exam General- Awake, alert and oriented x 3, looks comfortable  HEENT- Normocephalic, atraumatic, oral mucosa- moist  Neck- Supple, No carotid bruit, no JVD  CVS- Normal S1/ S2, Regular rate and rhythm, No murmur, No edema  Respiratory system- B/L clear breath sounds, no wheezing  Abdomen- Soft, Non distended, no tenderness, Bowel sound- present 4 quads  Genitourinary- No suprapubic tenderness, No CVA tenderness  Skin- No new bruise or rash  Musculoskeletal-reduced ROM right LE  Psych- No acute psychosis  CNS- CN II- XII grossly intact, No acute focal neurologic deficit noted      Additional Data:     Labs:  Results from last 7 days   Lab Units 05/07/23  0537   WBC Thousand/uL 10 77*   HEMOGLOBIN g/dL 8 3*   HEMATOCRIT % 25 7*   PLATELETS Thousands/uL 346   NEUTROS PCT % 66   LYMPHS PCT % 16   MONOS PCT % 14*   EOS PCT % 2     Results from last 7 days   Lab Units 05/07/23  0537 05/06/23  0518 05/05/23  0922   SODIUM mmol/L 133*   < > 132*   POTASSIUM mmol/L 3 8   < > 3 6   CHLORIDE mmol/L 101   < > 100   CO2 mmol/L 26   < > 25   BUN mg/dL 17   < > 19   CREATININE mg/dL 0 68   < > 0 75   ANION GAP mmol/L 6   < > 7   CALCIUM mg/dL 8 4   < > 8 6   ALBUMIN g/dL  --   --  3 0*   TOTAL BILIRUBIN mg/dL  --   --  0 76   ALK PHOS U/L  --   --  95   ALT U/L  --   --  5*   AST U/L  --   --  9*   GLUCOSE RANDOM mg/dL 149*   < > 210*    < > = values in this interval not displayed  Results from last 7 days   Lab Units 05/07/23  0808 05/06/23  2046 05/06/23  1814 05/06/23  1718 05/06/23  1705 05/06/23  1654 05/06/23  1630 05/06/23  1059 05/06/23  0813 05/05/23  2039 05/05/23  1638 05/05/23  1122   POC GLUCOSE mg/dl 191* 128 122 86 68 46* 50* 162* 151* 154* 95 223*               Lines/Drains:  Invasive Devices     Peripheral Intravenous Line  Duration           Peripheral IV 05/06/23 Left;Ventral (anterior) Forearm 1 day                      Imaging: No pertinent imaging reviewed      Recent Cultures (last 7 days):   Results from last 7 days   Lab Units 05/01/23  1358   GRAM STAIN RESULT  No Polys or Bacteria seen       Last 24 Hours Medication List: Current Facility-Administered Medications   Medication Dose Route Frequency Provider Last Rate   • acetaminophen  650 mg Oral Q8H PRN Abeba Gastelum PA-C     • aspirin  81 mg Oral Daily Abeba Gastelum PA-C     • atorvastatin  40 mg Oral QAM Abeba Gastelum PA-C     • clopidogrel  75 mg Oral QAM Aaron Wright PA-C     • docusate sodium  100 mg Oral BID Abeba Gastelum PA-C     • enoxaparin  40 mg Subcutaneous Daily Abeba Gastelum PA-C     • escitalopram  10 mg Oral Daily Abeba Gastelum PA-C     • HYDROmorphone  2 mg Oral Q6H PRN Queta Ellington PA-C     • HYDROmorphone  4 mg Oral Q6H PRN Queta Ellington PA-C     • HYDROmorphone  0 2 mg Intravenous Q3H PRN Queta Ellington PA-C     • insulin glargine  20 Units Subcutaneous HS Toni Up MD     • insulin lispro  15 Units Subcutaneous TID With Meals Toni Up MD     • insulin lispro  2-12 Units Subcutaneous 4x Daily (AC & HS) gL Neal MD     • levothyroxine  150 mcg Oral QAM Aaron Wright PA-C     • losartan  50 mg Oral Daily [facility] NAILA Villa     • pantoprazole  40 mg Oral Early Morning Abeba Gastelum PA-C     • senna-docusate sodium  1 tablet Oral Daily Nisreen Danielle Villafana PA-C     • tamsulosin  0 4 mg Oral Daily With Je Wright PA-C     • trimethobenzamide  200 mg Intramuscular Q6H PRN NAILA Gonzalez     • verapamil  360 mg Oral HS Aaron Wright PA-C     • zolpidem  10 mg Oral HS PRN Abeba Gastelum PA-C          Today, Patient Was Seen By: Toni Up MD    **Please Note: This note may have been constructed using a voice recognition system  **

## 2023-05-07 NOTE — ASSESSMENT & PLAN NOTE
· Patient with a hemoglobin 11 4 with acute drop to 8 after surgery  · Hemoglobin remains stable, 8 3  · Transfuse for <7  · Iron panel collected; noted to have iron deficiency anemia - receiving IV Venofer  Ok to DC at discharge   Will need OP hematology referral

## 2023-05-07 NOTE — PROGRESS NOTES
Progress Note - Orthopedics   Iveth Medina 59 y o  male MRN: [id number]  Unit/Bed#: -01      Subjective:    59 y o male POD#6 s/p ORIF Right tibial plateau nonunion  Patient states pain in right lowerleg is 8 5/10 this morning  Patient denies any numbness or tingling in right lower extremity  Patient has maintained nonweightbearing status with knee immobilizer  Patient denies any fevers or chills  Patient denies any shortness of breath, chest pain, lightheadedness, dizziness, nausea, and vomiting  Patient is awaiting rehab placement at this time  Patient offers no additional complaints      Labs:  0   Lab Value Date/Time    HCT 25 7 (L) 05/07/2023 0537    HCT 23 9 (L) 05/05/2023 1448    HCT 24 3 (L) 05/05/2023 0922    HGB 8 3 (L) 05/07/2023 0537    HGB 7 9 (L) 05/05/2023 1448    HGB 8 0 (L) 05/05/2023 0922    INR 1 04 08/04/2020 1358    WBC 10 77 (H) 05/07/2023 0537    WBC 13 73 (H) 05/05/2023 1448    WBC 16 19 (H) 05/04/2023 0813    ESR 24 (H) 02/21/2020 1101       Meds:    Current Facility-Administered Medications:   •  acetaminophen (TYLENOL) tablet 650 mg, 650 mg, Oral, Q8H PRN, Junito Grant PA-C, 650 mg at 05/04/23 2110  •  aspirin chewable tablet 81 mg, 81 mg, Oral, Daily, Junito Grant PA-C, 81 mg at 05/07/23 0827  •  atorvastatin (LIPITOR) tablet 40 mg, 40 mg, Oral, QAM, Aaron Wright PA-C, 40 mg at 05/07/23 9366  •  clopidogrel (PLAVIX) tablet 75 mg, 75 mg, Oral, QAM, Aaron Wright PA-C, 75 mg at 05/07/23 0827  •  docusate sodium (COLACE) capsule 100 mg, 100 mg, Oral, BID, Aaron Wright PA-C, 100 mg at 05/07/23 9858  •  enoxaparin (LOVENOX) subcutaneous injection 40 mg, 40 mg, Subcutaneous, Daily, Aaron Wright PA-C, 40 mg at 05/07/23 0540  •  escitalopram (LEXAPRO) tablet 10 mg, 10 mg, Oral, Daily, Aaron Wright PA-C, 10 mg at 05/07/23 2521  •  HYDROmorphone (DILAUDID) tablet 2 mg, 2 mg, Oral, Q6H PRN, Francisco Julien PA-C  •  HYDROmorphone (DILAUDID) tablet 4 mg, 4 mg, Oral, Q6H PRN, Oni Brown PA-C, 4 mg at 05/06/23 2146  •  HYDROmorphone HCl (DILAUDID) injection 0 2 mg, 0 2 mg, Intravenous, Q3H PRN, Oni Brown PA-C, 0 2 mg at 05/02/23 0405  •  insulin glargine (LANTUS) subcutaneous injection 20 Units 0 2 mL, 20 Units, Subcutaneous, HS, Page Braxton MD  •  insulin lispro (HumaLOG) 100 units/mL subcutaneous injection 15 Units, 15 Units, Subcutaneous, TID With Meals, Page Braxton MD  •  insulin lispro (HumaLOG) 100 units/mL subcutaneous injection 2-12 Units, 2-12 Units, Subcutaneous, 4x Daily (AC & HS), 2 Units at 05/07/23 0808 **AND** Fingerstick Glucose (POCT), , , 4x Daily AC and at bedtime, Jimbo Dhillon MD  •  levothyroxine tablet 150 mcg, 150 mcg, Oral, QAM, Aaron Wright PA-C, 150 mcg at 05/07/23 5827  •  losartan (COZAAR) tablet 50 mg, 50 mg, Oral, Daily, 50 mg at 05/06/23 0850 **AND** [DISCONTINUED] hydrochlorothiazide (HYDRODIURIL) tablet 12 5 mg, 12 5 mg, Oral, Daily, [facility] NAILA Villa  •  pantoprazole (PROTONIX) EC tablet 40 mg, 40 mg, Oral, Early Morning, Aaron Wright PA-C, 40 mg at 05/07/23 0540  •  senna-docusate sodium (SENOKOT S) 8 6-50 mg per tablet 1 tablet, 1 tablet, Oral, Daily, Nisreen Lyon PA-C, 1 tablet at 05/07/23 6016  •  tamsulosin (FLOMAX) capsule 0 4 mg, 0 4 mg, Oral, Daily With Dinner, Stefania Wright PA-C, 0 4 mg at 05/06/23 1653  •  trimethobenzamide (TIGAN) IM injection 200 mg, 200 mg, Intramuscular, Q6H PRN, NAILA Gibson, 200 mg at 05/06/23 2146  •  verapamil (CALAN-SR) CR tablet 360 mg, 360 mg, Oral, HS, Aaron Wright PA-C, 360 mg at 05/06/23 2146  •  zolpidem (AMBIEN) tablet 10 mg, 10 mg, Oral, HS PRN, Marlen Paul PA-C, 10 mg at 05/06/23 2146    Blood Culture:   Lab Results   Component Value Date    BLOODCX No Growth After 5 Days  10/10/2020    BLOODCX No Growth After 5 Days   10/10/2020       Wound Culture:   Lab Results   Component Value Date    WOUNDCULT 2+ Growth of Morganella morganii (A) 08/30/2020    WOUNDCULT (A) 08/30/2020     2+ Growth of Methicillin Resistant Staphylococcus aureus    WOUNDCULT 1+ Growth of Enterobacter cloacae complex (A) 08/30/2020    WOUNDCULT 1+ Growth of Enterococcus faecalis (A) 08/30/2020       Ins and Outs:  I/O last 24 hours: In: -   Out: 625 [Urine:625]          Physical:  Vitals:    05/07/23 0809   BP: 107/63   Pulse:    Resp:    Temp: 97 7 °F (36 5 °C)   SpO2:      Musculoskeletal:  Right lower extremity  • Patient is a 75-year-old male laying comfortably in hospital bed in no acute distress  • Knee immobilizer intact upon evaluation  • Bulky dressing dry and intact with small amount of dried strike through present over proximal tibia  • TTP throughout Right lower extremity  • Sensation intact to saphenous, sural, tibial, superficial peroneal nerve, and deep peroneal  • Motor intact to ankle dorsi/plantar flexion  • 2+ DP pulse, symmetric bilaterally  • Extremity warm and well perfused    Assessment:    58 y o male POD#6 s/p ORIF Right tibial plateau nonunion    Plan:  • Nonweightbearing Right lower extremity with knee immobilizer intact at all times   • Will monitor for ABLA and administer IVF/prbc as indicated for Greater than 2 gram drop or Hgb < 7  Hgb of 8 3 today  • PT/OT- up and out of bed, gait training while maintaining NWB status Right lower extremity  • Pain control  • DVT ppx- Lovenox 40mg once daily + Plavix   • Dispo: Patient is medically clear for discharge  Awaiting authorization for rehab facility at this time  See above for additional details       Avril Gant PA-C

## 2023-05-08 LAB
GLUCOSE SERPL-MCNC: 159 MG/DL (ref 65–140)
GLUCOSE SERPL-MCNC: 178 MG/DL (ref 65–140)
GLUCOSE SERPL-MCNC: 194 MG/DL (ref 65–140)
GLUCOSE SERPL-MCNC: 59 MG/DL (ref 65–140)

## 2023-05-08 RX ORDER — SODIUM PHOSPHATE, DIBASIC AND SODIUM PHOSPHATE, MONOBASIC 7; 19 G/133ML; G/133ML
1 ENEMA RECTAL DAILY PRN
Status: COMPLETED | OUTPATIENT
Start: 2023-05-08 | End: 2023-05-08

## 2023-05-08 RX ADMIN — ENOXAPARIN SODIUM 40 MG: 40 INJECTION SUBCUTANEOUS at 06:47

## 2023-05-08 RX ADMIN — ZOLPIDEM TARTRATE 10 MG: 5 TABLET, COATED ORAL at 21:52

## 2023-05-08 RX ADMIN — MAGNESIUM HYDROXIDE 30 ML: 400 SUSPENSION ORAL at 14:14

## 2023-05-08 RX ADMIN — INSULIN LISPRO 2 UNITS: 100 INJECTION, SOLUTION INTRAVENOUS; SUBCUTANEOUS at 09:22

## 2023-05-08 RX ADMIN — SENNOSIDES AND DOCUSATE SODIUM 1 TABLET: 50; 8.6 TABLET ORAL at 09:24

## 2023-05-08 RX ADMIN — PANTOPRAZOLE SODIUM 40 MG: 40 TABLET, DELAYED RELEASE ORAL at 06:46

## 2023-05-08 RX ADMIN — INSULIN LISPRO 2 UNITS: 100 INJECTION, SOLUTION INTRAVENOUS; SUBCUTANEOUS at 21:57

## 2023-05-08 RX ADMIN — INSULIN LISPRO 2 UNITS: 100 INJECTION, SOLUTION INTRAVENOUS; SUBCUTANEOUS at 12:14

## 2023-05-08 RX ADMIN — ASPIRIN 81 MG: 81 TABLET, CHEWABLE ORAL at 09:23

## 2023-05-08 RX ADMIN — DOCUSATE SODIUM 100 MG: 100 CAPSULE, LIQUID FILLED ORAL at 17:06

## 2023-05-08 RX ADMIN — TAMSULOSIN HYDROCHLORIDE 0.4 MG: 0.4 CAPSULE ORAL at 17:06

## 2023-05-08 RX ADMIN — LEVOTHYROXINE SODIUM 150 MCG: 150 TABLET ORAL at 09:23

## 2023-05-08 RX ADMIN — VERAPAMIL HYDROCHLORIDE 360 MG: 180 TABLET ORAL at 21:52

## 2023-05-08 RX ADMIN — CLOPIDOGREL BISULFATE 75 MG: 75 TABLET ORAL at 09:23

## 2023-05-08 RX ADMIN — DOCUSATE SODIUM 100 MG: 100 CAPSULE, LIQUID FILLED ORAL at 09:24

## 2023-05-08 RX ADMIN — INSULIN LISPRO 15 UNITS: 100 INJECTION, SOLUTION INTRAVENOUS; SUBCUTANEOUS at 12:14

## 2023-05-08 RX ADMIN — INSULIN LISPRO 15 UNITS: 100 INJECTION, SOLUTION INTRAVENOUS; SUBCUTANEOUS at 09:22

## 2023-05-08 RX ADMIN — HYDROMORPHONE HYDROCHLORIDE 4 MG: 4 TABLET ORAL at 21:51

## 2023-05-08 RX ADMIN — INSULIN GLARGINE 20 UNITS: 100 INJECTION, SOLUTION SUBCUTANEOUS at 21:52

## 2023-05-08 RX ADMIN — TRIMETHOBENZAMIDE HYDROCHLORIDE 200 MG: 100 INJECTION INTRAMUSCULAR at 14:48

## 2023-05-08 RX ADMIN — ATORVASTATIN CALCIUM 40 MG: 40 TABLET, FILM COATED ORAL at 09:23

## 2023-05-08 RX ADMIN — SODIUM PHOSPHATE, DIBASIC AND SODIUM PHOSPHATE, MONOBASIC 1 ENEMA: 7; 19 ENEMA RECTAL at 17:06

## 2023-05-08 RX ADMIN — HYDROMORPHONE HYDROCHLORIDE 4 MG: 4 TABLET ORAL at 14:23

## 2023-05-08 RX ADMIN — LOSARTAN POTASSIUM 50 MG: 50 TABLET, FILM COATED ORAL at 09:23

## 2023-05-08 NOTE — PLAN OF CARE
Problem: PAIN - ADULT  Goal: Verbalizes/displays adequate comfort level or baseline comfort level  Description: Interventions:  - Encourage patient to monitor pain and request assistance  - Assess pain using appropriate pain scale  - Administer analgesics based on type and severity of pain and evaluate response  - Implement non-pharmacological measures as appropriate and evaluate response  - Consider cultural and social influences on pain and pain management  - Notify physician/advanced practitioner if interventions unsuccessful or patient reports new pain  Outcome: Progressing     Problem: INFECTION - ADULT  Goal: Absence or prevention of progression during hospitalization  Description: INTERVENTIONS:  - Assess and monitor for signs and symptoms of infection  - Monitor lab/diagnostic results  - Monitor all insertion sites, i e  indwelling lines, tubes, and drains  - Monitor endotracheal if appropriate and nasal secretions for changes in amount and color  - Hernandez appropriate cooling/warming therapies per order  - Administer medications as ordered  - Instruct and encourage patient and family to use good hand hygiene technique  - Identify and instruct in appropriate isolation precautions for identified infection/condition  Outcome: Progressing  Goal: Absence of fever/infection during neutropenic period  Description: INTERVENTIONS:  - Monitor WBC    Outcome: Progressing     Problem: SAFETY ADULT  Goal: Patient will remain free of falls  Description: INTERVENTIONS:  - Educate patient/family on patient safety including physical limitations  - Instruct patient to call for assistance with activity   - Consult OT/PT to assist with strengthening/mobility   - Keep Call bell within reach  - Keep bed low and locked with side rails adjusted as appropriate  - Keep care items and personal belongings within reach  - Initiate and maintain comfort rounds  - Make Fall Risk Sign visible to staff  - Offer Toileting every  Hours, in advance of need  - Initiate/Maintain alarm  - Obtain necessary fall risk management equipment:   - Apply yellow socks and bracelet for high fall risk patients  - Consider moving patient to room near nurses station  Outcome: Progressing  Goal: Maintain or return to baseline ADL function  Description: INTERVENTIONS:  -  Assess patient's ability to carry out ADLs; assess patient's baseline for ADL function and identify physical deficits which impact ability to perform ADLs (bathing, care of mouth/teeth, toileting, grooming, dressing, etc )  - Assess/evaluate cause of self-care deficits   - Assess range of motion  - Assess patient's mobility; develop plan if impaired  - Assess patient's need for assistive devices and provide as appropriate  - Encourage maximum independence but intervene and supervise when necessary  - Involve family in performance of ADLs  - Assess for home care needs following discharge   - Consider OT consult to assist with ADL evaluation and planning for discharge  - Provide patient education as appropriate  Outcome: Progressing  Goal: Maintains/Returns to pre admission functional level  Description: INTERVENTIONS:  - Perform BMAT or MOVE assessment daily    - Set and communicate daily mobility goal to care team and patient/family/caregiver  - Collaborate with rehabilitation services on mobility goals if consulted  - Perform Range of Motion  times a day  - Reposition patient every  hours    - Dangle patient  times a day  - Stand patient  times a day  - Ambulate patient  times a day  - Out of bed to chair  times a day   - Out of bed for meals  times a day  - Out of bed for toileting  - Record patient progress and toleration of activity level   Outcome: Progressing     Problem: DISCHARGE PLANNING  Goal: Discharge to home or other facility with appropriate resources  Description: INTERVENTIONS:  - Identify barriers to discharge w/patient and caregiver  - Arrange for needed discharge resources and transportation as appropriate  - Identify discharge learning needs (meds, wound care, etc )  - Arrange for interpretive services to assist at discharge as needed  - Refer to Case Management Department for coordinating discharge planning if the patient needs post-hospital services based on physician/advanced practitioner order or complex needs related to functional status, cognitive ability, or social support system  Outcome: Progressing     Problem: Knowledge Deficit  Goal: Patient/family/caregiver demonstrates understanding of disease process, treatment plan, medications, and discharge instructions  Description: Complete learning assessment and assess knowledge base    Interventions:  - Provide teaching at level of understanding  - Provide teaching via preferred learning methods  Outcome: Progressing     Problem: SKIN/TISSUE INTEGRITY - ADULT  Goal: Skin Integrity remains intact(Skin Breakdown Prevention)  Description: Assess:  -Perform Fabrice assessment every   -Clean and moisturize skin every   -Inspect skin when repositioning, toileting, and assisting with ADLS  -Assess under medical devices such as  every   -Assess extremities for adequate circulation and sensation     Bed Management:  -Have minimal linens on bed & keep smooth, unwrinkled  -Change linens as needed when moist or perspiring  -Avoid sitting or lying in one position for more than  hours while in bed  -Keep HOB at degrees     Toileting:  -Offer bedside commode  -Assess for incontinence every   -Use incontinent care products after each incontinent episode such as     Activity:  -Mobilize patient  times a day  -Encourage activity and walks on unit  -Encourage or provide ROM exercises   -Turn and reposition patient every  Hours  -Use appropriate equipment to lift or move patient in bed  -Instruct/ Assist with weight shifting every  when out of bed in chair  -Consider limitation of chair time  hour intervals    Skin Care:  -Avoid use of baby powder, tape, friction and shearing, hot water or constrictive clothing  -Relieve pressure over bony prominences using   -Do not massage red bony areas    Next Steps:  -Teach patient strategies to minimize risks such as    -Consider consults to  interdisciplinary teams such as   Outcome: Progressing  Goal: Incision(s), wounds(s) or drain site(s) healing without S/S of infection  Description: INTERVENTIONS  - Assess and document dressing, incision, wound bed, drain sites and surrounding tissue  - Provide patient and family education  - Perform skin care/dressing changes every   Outcome: Progressing  Goal: Pressure injury heals and does not worsen  Description: Interventions:  - Implement low air loss mattress or specialty surface (Criteria met)  - Apply silicone foam dressing  - Instruct/assist with weight shifting every  minutes when in chair   - Limit chair time to  hour intervals  - Use special pressure reducing interventions such as  when in chair   - Apply fecal or urinary incontinence containment device   - Perform passive or active ROM every   - Turn and reposition patient & offload bony prominences every  hours   - Utilize friction reducing device or surface for transfers   - Consider consults to  interdisciplinary teams such as   - Use incontinent care products after each incontinent episode such as   - Consider nutrition services referral as needed  Outcome: Progressing     Problem: Prexisting or High Potential for Compromised Skin Integrity  Goal: Skin integrity is maintained or improved  Description: INTERVENTIONS:  - Identify patients at risk for skin breakdown  - Assess and monitor skin integrity  - Assess and monitor nutrition and hydration status  - Monitor labs   - Assess for incontinence   - Turn and reposition patient  - Assist with mobility/ambulation  - Relieve pressure over bony prominences  - Avoid friction and shearing  - Provide appropriate hygiene as needed including keeping skin clean and dry  - Evaluate need for skin moisturizer/barrier cream  - Collaborate with interdisciplinary team   - Patient/family teaching  - Consider wound care consult   Outcome: Progressing     Problem: MOBILITY - ADULT  Goal: Maintain or return to baseline ADL function  Description: INTERVENTIONS:  -  Assess patient's ability to carry out ADLs; assess patient's baseline for ADL function and identify physical deficits which impact ability to perform ADLs (bathing, care of mouth/teeth, toileting, grooming, dressing, etc )  - Assess/evaluate cause of self-care deficits   - Assess range of motion  - Assess patient's mobility; develop plan if impaired  - Assess patient's need for assistive devices and provide as appropriate  - Encourage maximum independence but intervene and supervise when necessary  - Involve family in performance of ADLs  - Assess for home care needs following discharge   - Consider OT consult to assist with ADL evaluation and planning for discharge  - Provide patient education as appropriate  Outcome: Progressing  Goal: Maintains/Returns to pre admission functional level  Description: INTERVENTIONS:  - Perform BMAT or MOVE assessment daily    - Set and communicate daily mobility goal to care team and patient/family/caregiver  - Collaborate with rehabilitation services on mobility goals if consulted  - Perform Range of Motion  times a day  - Reposition patient every  hours    - Dangle patient  times a day  - Stand patient  times a day  - Ambulate patient  times a day  - Out of bed to chair  times a day   - Out of bed for meals  times a day  - Out of bed for toileting  - Record patient progress and toleration of activity level   Outcome: Progressing

## 2023-05-08 NOTE — ASSESSMENT & PLAN NOTE
"/64   Pulse 62   Temp 98 7 °F (37 1 °C)   Resp 18   Ht 6' 4\" (1 93 m)   Wt 122 kg (268 lb 15 4 oz)   SpO2 92%   BMI 32 74 kg/m²   · Stable pressures   Continue home meds except HCTZ  · Monitor BP per unit protocol  "

## 2023-05-08 NOTE — CASE MANAGEMENT
Case Management Discharge Planning Note    Patient name Michael Moreno  Location [address] [id number]/-[id number] MRN [id number]  : 1965 Date 2023       Current Admission Date: 2023  Current Admission Diagnosis:Closed fracture of medial plateau of right tibia   Patient Active Problem List    Diagnosis Date Noted   • MANNY (obstructive sleep apnea) 2023   • Closed fracture of medial plateau of right tibia 2023   • Depression and anxiety 2023   • Acquired genu varum of right lower extremity 2023   • MARKELL (acute kidney injury) ([facility] ) 2023   • Chronic kidney disease-mineral and bone disorder 2023   • Closed fracture of right tibial plateau 9249   • Stage 3b chronic kidney disease ([facility] ) 11/15/2022   • Hyperkalemia 11/15/2022   • Alcoholism ([facility] ) 11/15/2022   • Dupuytren's contracture of right hand 2022   • Type 2 diabetes mellitus with diabetic peripheral angiopathy without gangrene ([facility] ) 2022   • Physical deconditioning 2021   • Acquired hypothyroidism 10/18/2021   • Acquired absence of right foot ([facility] ) 2021   • Persistent proteinuria 2021   • Abnormal EKG 2020   • Preoperative examination 2020   • Insomnia 2020   • Acute blood loss anemia 10/08/2020   • Hypomagnesemia 2020   • Urinary retention 2020   • Thyroid cancer ([facility] ) 2020   • PAD (peripheral artery disease) ([facility] ) 2020   • Hyponatremia 2020   • Health maintenance examination 2020   • Elevated liver enzymes 10/24/2019   • Colon polyps 2019   • Right-sided tinnitus 2018   • Hypertension, essential 2018   • Gastroesophageal reflux disease without esophagitis 2018   • Obesity, Class I, BMI 30 0-34 9 (see actual BMI) 2010   • Hyperlipidemia 2009   • History of nonadherence to medical treatment 2008      LOS (days): 7  Geometric Mean LOS (GMLOS) (days): 4 00  Days to GMLOS:-3     OBJECTIVE:  Risk of Unplanned Readmission Score: 23 26      Current admission status: Inpatient   Preferred Pharmacy:   [id number] [address], [address] [place] [id number]  Phone: [phone] Fax: [phone] [address] [place] [address]  Phone: [phone] Fax: [phone]    Primary Care Provider: Nathaniel Coleman MD    Primary Insurance: BLUE CROSS  Secondary Insurance:     DISCHARGE DETAILS:  Patient reviewed during Interdisciplinary Rounds with PANKAJ today  Cleared for d/c pending insurance auth  Auth submitted by DCS, per update today, it is still pending  [facility] requested updated PT/OT notes for completion of pre-admission screening  CM made request to PT/OT via TT  Patient seen, notes are in, CM updated [facility] via [address]  Patient will need to have a BM and Covid screen prior to d/c  SLIM and nurse aware  CM will continue to follow for d/c planning

## 2023-05-08 NOTE — PHYSICAL THERAPY NOTE
Physical Therapy Treatment Note    Patient's Name: Eulice Merlin    Admitting Diagnosis  Right medial tibial plateau fracture, closed, initial encounter [S82 131A]    Problem List  Patient Active Problem List   Diagnosis    Hypertension, essential    Gastroesophageal reflux disease without esophagitis    Hyperlipidemia    History of nonadherence to medical treatment    Right-sided tinnitus    Colon polyps    Elevated liver enzymes    Health maintenance examination    Hyponatremia    PAD (peripheral artery disease) (HCC)    Thyroid cancer ([facility] )    Urinary retention    Hypomagnesemia    Acute blood loss anemia    Abnormal EKG    Preoperative examination    Insomnia    Persistent proteinuria    Acquired absence of right foot ([facility] )    Acquired hypothyroidism    Physical deconditioning    Type 2 diabetes mellitus with diabetic peripheral angiopathy without gangrene ([facility] )    Dupuytren's contracture of right hand    Stage 3b chronic kidney disease ([facility] )    Hyperkalemia    Alcoholism ([facility] )    Closed fracture of right tibial plateau    Obesity, Class I, BMI 30 0-34 9 (see actual BMI)    [facility]LL [facility]acute [facility]) [facility] )    Chronic kidney disease-mineral and bone disorder    Acquired genu varum of right lower extremity    Depression and anxiety    Closed fracture of medial plateau of right tibia    MANNY (obstructive sleep apnea)        05/08/23 1255   PT Last Visit   PT Visit Date 05/08/23   Note Type   Note Type Treatment   Pain Assessment   Pain Assessment Tool 0-10   Pain Score 9   Pain Location/Orientation Orientation: Right;Location: Knee   Pain Onset/Description Onset: Ongoing;Frequency: Constant/Continuous   Patient's Stated Pain Goal No pain   Hospital Pain Intervention(s) Repositioned   Restrictions/Precautions   Weight Bearing Precautions Per Order Yes   RLE Weight Bearing Per Order NWB   Braces or Orthoses Knee immobilizer  (donned and readjusted)   Other Precautions Bed Alarm; Fall Risk;Pain;WBS   General   Chart Reviewed Yes   Response to Previous Treatment Patient with no complaints from previous session  Family/Caregiver Present No   Cognition   Overall Cognitive Status WFL   Arousal/Participation Alert; Responsive; Cooperative   Attention Within functional limits   Orientation Level Oriented X4   Memory Within functional limits   Following Commands Follows all commands and directions without difficulty   Comments Pt agreeable to PT   Bed Mobility   Rolling R 5  Supervision   Additional items Assist x 1;HOB elevated; Bedrails;Verbal cues   Rolling L 5  Supervision   Additional items Assist x 1;HOB elevated; Bedrails;Verbal cues   Supine to Sit 5  Supervision   Additional items Assist x 1;HOB elevated; Bedrails;Verbal cues   Sit to Supine 5  Supervision   Additional items Assist x 1;HOB elevated; Bedrails;Verbal cues   Additional Comments Pt received supine in bed with HOB elevated  Following session, pt returned to bed and remained with needs met, alarm activated and call bell within reach   Transfers   Sit to Stand Unable to assess   Additional Comments Pt declines to stand at this time due to reports of increased pain in RLE   Ambulation/Elevation   Gait pattern Not tested   Balance   Static Sitting Fair +   Dynamic Sitting Fair   Endurance Deficit   Endurance Deficit Yes   Endurance Deficit Description decreased activity tolerance   Activity Tolerance   Activity Tolerance Patient limited by fatigue;Patient limited by pain   Medical Staff Made Aware Co-treat with ILA Frank due to medical complexity and clinical presentation   Nurse Made Aware RN [place]   Exercises   Heelslides Supine;15 reps;AROM; Bilateral   Hip Flexion Sitting;15 reps;AROM; Left   Hip Abduction Supine;15 reps;AROM; Bilateral   Knee AROM Long Arc Quad Sitting;20 reps;AROM; Left   Ankle Pumps Sitting;20 reps;AROM; Bilateral   Assessment   Prognosis Fair   Problem List Decreased strength;Decreased range of motion;Decreased endurance; Impaired balance;Decreased mobility;Orthopedic restrictions;Pain; Impaired sensation   Assessment Pt seen for PT treatment session this date with interventions consisting of Therapeutic exercise consisting of: BLE exercises performed seated and supine and therapeutic activity consisting of training: bed mobility, supine<>sit transfers and static sitting tolerance at EOB for 15 minutes w/ varying UE support  Pt agreeable to PT treatment session upon arrival, pt found supine in bed w/ HOB elevated, in no apparent distress and responsive  In comparison to previous session, pt with improvements in activity tolerance and balance  Post session: pt returned BTB, bed alarm engaged, all needs in reach and RN notified of session findings/recommendations  Continue to recommend post acute rehabilitation services at time of d/c in order to maximize pt's functional independence and safety w/ mobility  Pt continues to be functioning below baseline level, and remains limited 2* factors listed above and including continued need for medical management and monitoring, decreased strength and balance resulting in an increased risk for falls, decreased endurance and activity tolerance limiting mobility and increased pain  PT will continue to see pt during current hospitalization in order to address the deficits listed above and provide interventions consistent w/ POC in effort to achieve STGs  Barriers to Discharge Inaccessible home environment;Decreased caregiver support   Goals   STG Expiration Date 05/12/23   PT Treatment Day 2   Plan   Treatment/Interventions Functional transfer training;LE strengthening/ROM;ADL retraining; Therapeutic exercise; Endurance training;Bed mobility; Compensatory technique education;Spoke to nursing   Progress Slow progress, decreased activity tolerance   PT Frequency 4-6x/wk   Recommendation   PT Discharge Recommendation Post acute rehabilitation services   AM-PAC Basic Mobility Inpatient   Turning in Flat Bed Without Bedrails 3 Lying on Back to Sitting on Edge of Flat Bed Without Bedrails 3   Moving Bed to Chair 1   Standing Up From Chair Using Arms 1   Walk in Room 1   Climb 3-5 Stairs With Railing 1   Basic Mobility Inpatient Raw Score 10   Turning Head Towards Sound 4   Follow Simple Instructions 4   Low Function Basic Mobility Raw Score  18   Low Function Basic Mobility Standardized Score  29 25   Highest Level Of Mobility   -M Goal 4: Move to chair/commode   -HL Achieved 3: Sit at edge of bed   Education   Education Provided Mobility training   Patient Reinforcement needed   End of Consult   Patient Position at End of Consult Supine;Bed/Chair alarm activated; All needs within reach       John Giles PT    Time In: 12:55  Time Out: 13:19  Total Time: 24 mins

## 2023-05-08 NOTE — ASSESSMENT & PLAN NOTE
· Baseline appears to be 0 9-1 1  · Stable  · Avoid nephrotoxins; hypotension  · Nephrology on board  · Monitor BMP periodically

## 2023-05-08 NOTE — OCCUPATIONAL THERAPY NOTE
Occupational Therapy Treatment Note     Patient Name: Merlene Patten  Today's Date: 5/8/2023  Problem List  Principal Problem:    Closed fracture of medial plateau of right tibia  Active Problems:    Hypertension, essential    Hyperlipidemia    Hyponatremia    PAD (peripheral artery disease) ([facility])    Acute blood loss anemia    Type 2 diabetes mellitus with diabetic peripheral angiopathy without gangrene ([facility])    Stage 3b chronic kidney disease ([facility])    MANNY (obstructive sleep apnea)        05/08/23 1314   OT Last Visit   OT Visit Date 05/08/23   Note Type   Note Type Treatment for insurance authorization   Pain Assessment   Pain Assessment Tool 0-10   Pain Score 9   Pain Location/Orientation Orientation: Right;Location: Knee   Pain Onset/Description Onset: Ongoing;Frequency: Constant/Continuous   Hospital Pain Intervention(s) Medication (See MAR); Repositioned; Ambulation/increased activity   Restrictions/Precautions   Weight Bearing Precautions Per Order Yes   RLE Weight Bearing Per Order NWB   Braces or Orthoses Knee immobilizer   Other Precautions Fall Risk;Pain;WBS; Bed Alarm   Lifestyle   Autonomy Patient reporting being independent with ADLs, ambulatory with wheelchair and lives with wife in a one level house   Reciprocal Relationships Wife and friend   Service to Others On disability   ADL   Where Assessed Supine, bed  (Pt declined bathing as he stated he did all ADLs yesterday with wife)   LB Dressing Assistance 3  Moderate Assistance   LB Dressing Deficit Setup;Verbal cueing; Increased time to complete; Thread RLE into pants; Thread LLE into pants;Pull up over hips   LB Dressing Comments Pt donned shorts   Bed Mobility   Rolling R 5  Supervision   Additional items Assist x 1;Bedrails; Increased time required;Verbal cues   Rolling L 5  Supervision   Additional items Assist x 1;Bedrails; Increased time required;Verbal cues   Supine to Sit 5  Supervision   Additional items Assist x 1;HOB elevated; Bedrails; Increased time required;Verbal cues   Sit to Supine 5  Supervision   Additional items Assist x 1;HOB elevated; Bedrails; Increased time required;Verbal cues   Transfers   Sit to Stand Unable to assess  (Pt adamently refused standing trial d/t pain  Stated that he will attempt standing once at rehab )   Therapeutic Exercise - ROM   UE-ROM Yes  (to increase strength and endurance to improve independence with ADLs)   ROM- Right Upper Extremities   R Shoulder AROM; Flexion; Extension;Horizontal ABduction  (Horizontal Adduction, Protraction/Retraction)   R Position Supine;Against gravity   R Weight/Reps/Sets 1 set x 15 reps each   ROM - Left Upper Extremities    L Shoulder AROM; Flexion; Extension;Horizontal ABduction  (Horizontal Adduction, Protraction/Retraction)   L Position Seated;Against gravity   L Weight/Reps/Sets 1 set x 15 reps each   Cognition   Overall Cognitive Status WFL   Arousal/Participation Alert; Responsive; Cooperative   Attention Within functional limits   Orientation Level Oriented X4   Memory Within functional limits   Following Commands Follows all commands and directions without difficulty   Activity Tolerance   Activity Tolerance Patient limited by pain; Patient limited by fatigue   Medical Staff Made Aware Pt seen as a co-treat with PT Rowena Dickson   Assessment   Assessment Patient participated in Skilled OT session this date with interventions consisting of ADL re training with the use of correct body mechnaics, therapeutic exercise to: increase functional use of BUEs, increase BUE muscle strength ,  therapeutic activities to: increase activity tolerance and increase OOB/ sitting tolerance   Patient agreeable to OT treatment session, upon arrival patient was found supine in bed and in no apparent distress  Patient completed bed mobility with supervision and sat EOB with supervision  While seated at EOB, pt completed 1 set x 15 reps of BUE exercises   While supine in bed, pt donned shorts with mod assist  In comparison to previous session, patient with improvements in sitting tolerance and bed mobility  Patient requiring one step directives and frequent rest periods  Patient continues to be functioning below baseline level, occupational performance remains limited secondary to factors listed above and increased risk for falls and injury  From OT standpoint, recommendation at time of d/c would be Post acute rehabilitation services  Patient to benefit from continued Occupational Therapy treatment while in the hospital to address deficits as defined above and maximize level of functional independence with ADLs and functional mobility  Plan   Treatment Interventions ADL retraining;Functional transfer training;UE strengthening/ROM; Endurance training;Patient/family training   Goal Expiration Date 05/17/23   OT Treatment Day 2   OT Frequency 3-5x/wk   Recommendation   OT Discharge Recommendation Post acute rehabilitation services   AM-PAC Daily Activity Inpatient   Lower Body Dressing 2   Bathing 2   Toileting 2   Upper Body Dressing 3   Grooming 3   Eating 3   Daily Activity Raw Score 15   Daily Activity Standardized Score (Calc for Raw Score >=11) 34 69   AM-PAC Applied Cognition Inpatient   Following a Speech/Presentation 4   Understanding Ordinary Conversation 4   Taking Medications 3   Remembering Where Things Are Placed or Put Away 3   Remembering List of 4-5 Errands 3   Taking Care of Complicated Tasks 3   Applied Cognition Raw Score 20   Applied Cognition Standardized Score 41 76   End of Consult   Patient Position at End of Consult Supine; All needs within reach   Nurse Communication Nurse aware of consult     [facility] OTD, OTR/L

## 2023-05-08 NOTE — UTILIZATION REVIEW
Continued Stay Review    Date: 5/8                          Current Patient Class: INpatient   Current Level of Care: MEd/surg    HPI:58 y o  male initially admitted on 5/1     Assessment/Plan:  Hgb remains stable at this time  Pain controlled  Continue with venofer IV  Case management working on safe discharge plan  ORtho consult:  POD#7 s/p ORIF Right tibial plateau nonunion  Patient is laying comfortably in hospital bed in no acute distress  Patient reports pain in right leg is 8/10 currently  Continue with Lovenox  Vital Signs:   Date/Time Temp Pulse Resp BP MAP (mmHg)   05/08/23 0758 98 7 °F (37 1 °C) 62 -- 111/64 80   05/08/23 07:56:40 98 7 °F (37 1 °C) -- -- 111/64 80   05/07/23 2100 97 6 °F (36 4 °C) -- -- 129/74 92         Pertinent Labs/Diagnostic Results:       Results from last 7 days   Lab Units 05/07/23  0537 05/05/23  1448 05/05/23  0922 05/04/23  1807 05/04/23  0813 05/03/23  2038 05/03/23  0459   WBC Thousand/uL 10 77* 13 73*  --   --  16 19*  --  19 42*   HEMOGLOBIN g/dL 8 3* 7 9* 8 0* 8 1* 8 0*   < > 8 1*   HEMATOCRIT % 25 7* 23 9* 24 3* 24 8* 24 4*   < > 25 0*   PLATELETS Thousands/uL 346 293  --   --  263  --  260   NEUTROS ABS Thousands/µL 7 12  --   --   --  13 26*  --  15 11*    < > = values in this interval not displayed           Results from last 7 days   Lab Units 05/07/23  0537 05/06/23  0518 05/05/23  0922 05/04/23  1807 05/04/23  0813 05/02/23  0502 05/01/23  1703   SODIUM mmol/L 133* 134* 132* 131* 132*   < >  --    POTASSIUM mmol/L 3 8 4 0 3 6 3 4* 3 6   < >  --    CHLORIDE mmol/L 101 101 100 100 100   < >  --    CO2 mmol/L 26 26 25 24 24   < >  --    CO2, I-STAT mmol/L  --   --   --   --   --   --  25   ANION GAP mmol/L 6 7 7 7 8   < >  --    BUN mg/dL 17 21 19 16 19   < >  --    CREATININE mg/dL 0 68 0 90 0 75 0 68 0 75   < >  --    EGFR ml/min/1 73sq m 103 92 99 103 99   < >  --    CALCIUM mg/dL 8 4 8 6 8 6 8 5 8 5   < >  --    CALCIUM, IONIZED, ISTAT mmol/L  --   --   -- --   --   --  1 20    < > = values in this interval not displayed       Results from last 7 days   Lab Units 05/05/23  0922   AST U/L 9*   ALT U/L 5*   ALK PHOS U/L 95   TOTAL PROTEIN g/dL 6 5   ALBUMIN g/dL 3 0*   TOTAL BILIRUBIN mg/dL 0 76     Results from last 7 days   Lab Units 05/08/23  1117 05/08/23  0824 05/07/23  2055 05/07/23  1609 05/07/23  1343 05/07/23  1054 05/07/23  0808 05/06/23  2046 05/06/23  1814 05/06/23  1718 05/06/23  1705 05/06/23  1654   POC GLUCOSE mg/dl 178* 194* 91 164* 229* 240* 191* 128 122 86 68 46*     Results from last 7 days   Lab Units 05/07/23  0537 05/06/23  0518 05/05/23  0922 05/04/23  1807 05/04/23  0813 05/03/23  0459 05/02/23  0502   GLUCOSE RANDOM mg/dL 149* 127 210* 183* 174* 181* 163*     Results from last 7 days   Lab Units 05/03/23  0952   OSMOLALITY, SERUM mmol/         BETA-HYDROXYBUTYRATE   Date Value Ref Range Status   03/03/2023 3 4 (H) <0 6 mmol/L Final        Results from last 7 days   Lab Units 05/01/23  1703   PH, FALGUNI I-STAT  7 365   PCO2, FALGUNI ISTAT mm HG 42 1   PO2, FALGUNI ISTAT mm HG 45 0   HCO3, FALGUNI ISTAT mmol/L 24 0   I STAT BASE EXC mmol/L -1   I STAT O2 SAT % 79         Results from last 7 days   Lab Units 05/03/23  0952   FERRITIN ng/mL 749*   IRON SATURATION % 7*   IRON ug/dL 14*   TIBC ug/dL 206*         Results from last 7 days   Lab Units 05/03/23  0952 05/03/23  0927   OSMOLALITY, SERUM mmol/  --    OSMO UR mmol/KG  --  646     Results from last 7 days   Lab Units 05/03/23  0927   SODIUM UR  50       Medications:   Scheduled Medications:  aspirin, 81 mg, Oral, Daily  atorvastatin, 40 mg, Oral, QAM  clopidogrel, 75 mg, Oral, QAM  docusate sodium, 100 mg, Oral, BID  enoxaparin, 40 mg, Subcutaneous, Daily  escitalopram, 10 mg, Oral, Daily  insulin glargine, 20 Units, Subcutaneous, HS  insulin lispro, 15 Units, Subcutaneous, TID With Meals  insulin lispro, 2-12 Units, Subcutaneous, 4x Daily (AC & HS)  levothyroxine, 150 mcg, Oral, QAM  losartan, 50 mg, Oral, Daily  pantoprazole, 40 mg, Oral, Early Morning  senna-docusate sodium, 1 tablet, Oral, Daily  tamsulosin, 0 4 mg, Oral, Daily With Dinner  verapamil, 360 mg, Oral, HS      Continuous IV Infusions:     PRN Meds:  acetaminophen, 650 mg, Oral, Q8H PRN  HYDROmorphone, 2 mg, Oral, Q6H PRN  HYDROmorphone, 4 mg, Oral, Q6H PRN x1 5/8  HYDROmorphone, 0 2 mg, Intravenous, Q3H PRN  magnesium hydroxide, 30 mL, Oral, Daily PRN  sodium phosphate-biphosphate, 1 enema, Rectal, Daily PRN  trimethobenzamide, 200 mg, Intramuscular, Q6H PRN  zolpidem, 10 mg, Oral, HS PRN        Discharge Plan: d    Network Utilization Review Department  ATTENTION: Please call with any questions or concerns to [phone] and carefully listen to the prompts so that you are directed to the right person  All voicemails are confidential   Alyson Hinkle all requests for admission clinical reviews, approved or denied determinations and any other requests to dedicated fax number below belonging to the campus where the patient is receiving treatment   List of dedicated fax numbers for the Facilities:  [address] DENIALS (Administrative/Medical Necessity) [phone]   [address] (Maternity/NICU/Pediatrics) [phone]   91 Cassie Rascon [phone]   [facility] [address] [phone]   [address] [place] [address] Arron Escalona 28 [phone]   1556 [facility] [place] Olav [facility] 134 815 [facility] [phone]

## 2023-05-08 NOTE — ASSESSMENT & PLAN NOTE
· Patient with a hemoglobin 11 4 with acute drop to 8 after surgery  · Hemoglobin remains stable, above 8  · Transfuse for <7  · Iron panel collected; noted to have iron deficiency anemia - receiving IV Venofer  Ok to DC at discharge   Will need OP hematology referral

## 2023-05-08 NOTE — PLAN OF CARE
Problem: PHYSICAL THERAPY ADULT  Goal: Performs mobility at highest level of function for planned discharge setting  See evaluation for individualized goals  Description: Treatment/Interventions: LE strengthening/ROM, Therapeutic exercise, Endurance training, Patient/family training, Equipment eval/education, Bed mobility, Continued evaluation, Spoke to nursing, OT  Equipment Recommended:  (TBD)       See flowsheet documentation for full assessment, interventions and recommendations  Outcome: Progressing  Note: Prognosis: Fair  Problem List: Decreased strength, Decreased range of motion, Decreased endurance, Impaired balance, Decreased mobility, Orthopedic restrictions, Pain, Impaired sensation  Assessment: Pt seen for PT treatment session this date with interventions consisting of Therapeutic exercise consisting of: BLE exercises performed seated and supine and therapeutic activity consisting of training: bed mobility, supine<>sit transfers and static sitting tolerance at EOB for 15 minutes w/ varying UE support  Pt agreeable to PT treatment session upon arrival, pt found supine in bed w/ HOB elevated, in no apparent distress and responsive  In comparison to previous session, pt with improvements in activity tolerance and balance  Post session: pt returned BTB, bed alarm engaged, all needs in reach and RN notified of session findings/recommendations  Continue to recommend post acute rehabilitation services at time of d/c in order to maximize pt's functional independence and safety w/ mobility  Pt continues to be functioning below baseline level, and remains limited 2* factors listed above and including continued need for medical management and monitoring, decreased strength and balance resulting in an increased risk for falls, decreased endurance and activity tolerance limiting mobility and increased pain   PT will continue to see pt during current hospitalization in order to address the deficits listed above and provide interventions consistent w/ POC in effort to achieve STGs  Barriers to Discharge: Inaccessible home environment, Decreased caregiver support     PT Discharge Recommendation: Post acute rehabilitation services    See flowsheet documentation for full assessment

## 2023-05-08 NOTE — PLAN OF CARE
Problem: OCCUPATIONAL THERAPY ADULT  Goal: Performs self-care activities at highest level of function for planned discharge setting  See evaluation for individualized goals  Description: Treatment Interventions: ADL retraining, Functional transfer training, UE strengthening/ROM, Endurance training, Patient/family training          See flowsheet documentation for full assessment, interventions and recommendations  Note: Limitation: Decreased ADL status, Decreased UE strength, Decreased endurance, Decreased self-care trans, Decreased high-level ADLs  Prognosis: Good  Assessment: Patient participated in Skilled OT session this date with interventions consisting of ADL re training with the use of correct body mechnaics, therapeutic exercise to: increase functional use of BUEs, increase BUE muscle strength ,  therapeutic activities to: increase activity tolerance and increase OOB/ sitting tolerance   Patient agreeable to OT treatment session, upon arrival patient was found supine in bed and in no apparent distress  Patient completed bed mobility with supervision and sat EOB with supervision  While seated at EOB, pt completed 1 set x 15 reps of BUE exercises  While supine in bed, pt donned shorts with mod assist  In comparison to previous session, patient with improvements in sitting tolerance and bed mobility  Patient requiring one step directives and frequent rest periods  Patient continues to be functioning below baseline level, occupational performance remains limited secondary to factors listed above and increased risk for falls and injury  From OT standpoint, recommendation at time of d/c would be Post acute rehabilitation services  Patient to benefit from continued Occupational Therapy treatment while in the hospital to address deficits as defined above and maximize level of functional independence with ADLs and functional mobility       OT Discharge Recommendation: Post acute rehabilitation services Nagi Mora OTDIANNE, OTR/L

## 2023-05-08 NOTE — CASE MANAGEMENT
Called into [email or web address] P# [phone] to check status of pending acute rehab auth  Per Rebeca Moss (call ref# [id number]), [place] was received and is still pending  Pending auth # [id number]  CM notified

## 2023-05-08 NOTE — PROGRESS NOTES
Progress Note - Orthopedics   Lou Bradshaw 59 y o  male MRN: [id number]  Unit/Bed#: -01      Subjective:    59 y o male POD#7 s/p ORIF Right tibial plateau nonunion  Patient is laying comfortably in hospital bed in no acute distress  Patient reports pain in right leg is 8/10 currently  Patient denies any numbness or tingling in right lower extremity  Patient has maintained nonweightbearing status with knee immobilizer  Patient has been compliant with Lovenox  Patient denies any fevers or chills  Patient mentions having some nausea and lightheadedness this morning  Patient is awaiting rehab placement at this time  Patient offers no additional complaints      Labs:  0   Lab Value Date/Time    HCT 25 7 (L) 05/07/2023 0537    HCT 23 9 (L) 05/05/2023 1448    HCT 24 3 (L) 05/05/2023 0922    HGB 8 3 (L) 05/07/2023 0537    HGB 7 9 (L) 05/05/2023 1448    HGB 8 0 (L) 05/05/2023 0922    INR 1 04 08/04/2020 1358    WBC 10 77 (H) 05/07/2023 0537    WBC 13 73 (H) 05/05/2023 1448    WBC 16 19 (H) 05/04/2023 0813    ESR 24 (H) 02/21/2020 1101       Meds:    Current Facility-Administered Medications:   •  acetaminophen (TYLENOL) tablet 650 mg, 650 mg, Oral, Q8H PRN, Alejandro Andino PA-C, 650 mg at 05/04/23 2110  •  aspirin chewable tablet 81 mg, 81 mg, Oral, Daily, Alejandro Andino PA-C, 81 mg at 05/07/23 0827  •  atorvastatin (LIPITOR) tablet 40 mg, 40 mg, Oral, QAM, Aaron Wright PA-C, 40 mg at 05/07/23 7474  •  clopidogrel (PLAVIX) tablet 75 mg, 75 mg, Oral, QAM, Aaron Wright PA-C, 75 mg at 05/07/23 0827  •  docusate sodium (COLACE) capsule 100 mg, 100 mg, Oral, BID, Aaron Wright PA-C, 100 mg at 05/07/23 1722  •  enoxaparin (LOVENOX) subcutaneous injection 40 mg, 40 mg, Subcutaneous, Daily, Aaron Wright PA-C, 40 mg at 05/08/23 9854  •  escitalopram (LEXAPRO) tablet 10 mg, 10 mg, Oral, Daily, Aaron Wright PA-C, 10 mg at 05/07/23 0421  •  HYDROmorphone (DILAUDID) tablet 2 mg, 2 mg, Oral, Q6H PRN, Ashley Sarmiento PA-C  •  HYDROmorphone (DILAUDID) tablet 4 mg, 4 mg, Oral, Q6H PRN, Ruth Ann Hargrove PA-C, 4 mg at 05/07/23 1919  •  HYDROmorphone HCl (DILAUDID) injection 0 2 mg, 0 2 mg, Intravenous, Q3H PRN, Ashley Sarmiento PA-C, 0 2 mg at 05/02/23 0405  •  insulin glargine (LANTUS) subcutaneous injection 20 Units 0 2 mL, 20 Units, Subcutaneous, HS, Rima Obando MD, 20 Units at 05/07/23 2120  •  insulin lispro (HumaLOG) 100 units/mL subcutaneous injection 15 Units, 15 Units, Subcutaneous, TID With Meals, Rima Obando MD, 15 Units at 05/07/23 1720  •  insulin lispro (HumaLOG) 100 units/mL subcutaneous injection 2-12 Units, 2-12 Units, Subcutaneous, 4x Daily (AC & HS), 2 Units at 05/07/23 1700 **AND** Fingerstick Glucose (POCT), , , 4x Daily AC and at bedtime, Ki Kaufman MD  •  levothyroxine tablet 150 mcg, 150 mcg, Oral, [facility], Aaron Wright PA-C, 150 mcg at 05/07/23 8080  •  losartan (COZAAR) tablet 50 mg, 50 mg, Oral, Daily, 50 mg at 05/06/23 0850 **AND** [DISCONTINUED] hydrochlorothiazide (HYDRODIURIL) tablet 12 5 mg, 12 5 mg, Oral, Daily, [facility] NAILA Villa  •  pantoprazole (PROTONIX) EC tablet 40 mg, 40 mg, Oral, Early Morning, Aaron Wright PA-C, 40 mg at 05/08/23 9539  •  senna-docusate sodium (SENOKOT S) 8 6-50 mg per tablet 1 tablet, 1 tablet, Oral, Daily, Nisreen Escobar PA-C, 1 tablet at 05/07/23 9414  •  tamsulosin (FLOMAX) capsule 0 4 mg, 0 4 mg, Oral, Daily With Dinner, Myles Jade PA-C, 0 4 mg at 05/07/23 1722  •  trimethobenzamide (TIGAN) IM injection 200 mg, 200 mg, Intramuscular, Q6H PRN, NAILA Medina, 200 mg at 05/07/23 1723  •  verapamil (CALAN-SR) CR tablet 360 mg, 360 mg, Oral, HS, Aaron Wright PA-C, 360 mg at 05/07/23 2105  •  zolpidem (AMBIEN) tablet 10 mg, 10 mg, Oral, HS PRN, Myles Jade PA-C, 10 mg at 05/07/23 2105    Blood Culture:   Lab Results   Component Value Date    BLOODCX No Growth After 5 Days  10/10/2020    BLOODCX No Growth After 5 Days  10/10/2020       Wound Culture:   Lab Results   Component Value Date    WOUNDCULT 2+ Growth of Morganella morganii (A) 08/30/2020    WOUNDCULT (A) 08/30/2020     2+ Growth of Methicillin Resistant Staphylococcus aureus    WOUNDCULT 1+ Growth of Enterobacter cloacae complex (A) 08/30/2020    WOUNDCULT 1+ Growth of Enterococcus faecalis (A) 08/30/2020       Ins and Outs:  I/O last 24 hours: In: -   Out: 100 [Urine:100]          Physical:  Vitals:    05/07/23 2100   BP: 129/74   Pulse:    Resp:    Temp: 97 6 °F (36 4 °C)   SpO2:      Musculoskeletal:  Right lower extremity  • Patient is a 57-year-old male laying comfortably in hospital bed in no acute distress  • Knee immobilizer intact upon evaluation  • Bulky dressing dry and intact with small amount of dried strike through present over proximal tibia   Bulky dressings removed today  Incisions appear intact without erythema and minimal drainage present  New dressings placed over incisions  • TTP throughout Right lower extremity  • Sensation intact to saphenous, sural, tibial, superficial peroneal nerve, and deep peroneal  • Motor intact to ankle dorsi/plantar flexion  • 2+ DP pulse, symmetric bilaterally  • Extremity warm and well perfused    Assessment:    58 y o male POD#7 s/p ORIF Right tibial plateau nonunion    Plan:  • Nonweightbearing Right lower extremity with knee immobilizer intact at all times   • Will monitor for ABLA and administer IVF/prbc as indicated for Greater than 2 gram drop or Hgb < 7  Hgb of 8 3 yesterday  • PT/OT- up and out of bed, gait training while maintaining NWB status Right lower extremity  • Pain control  • Bulky dressings changed today  Patient may shower at this time and change dressings as needed  • DVT ppx- Lovenox 40mg once daily + Plavix   • Dispo: Awaiting authorization for rehab facility at this time  Discharge planning today pending authorization   See above for additional details       Chirag Owusu PA-C

## 2023-05-08 NOTE — PROGRESS NOTES
"[facility]  Progress Note  Name: Sebastian Mcmanus  MRN: [id number]  Unit/Bed#: -01 I Date of Admission: 5/1/2023   Date of Service: 5/8/2023 I Hospital Day: 7    Assessment/Plan   MANNY (obstructive sleep apnea)  Assessment & Plan  · Continue CPAP, patient reported he is not compliant  · Importance of compliance with regimen  Stage 3b chronic kidney disease ([facility] 73 )  Assessment & Plan  · Baseline appears to be 0 9-1 1  · Stable  · Avoid nephrotoxins; hypotension  · Nephrology on board  · Monitor BMP periodically    Type 2 diabetes mellitus with diabetic peripheral angiopathy without gangrene (HCC)  Assessment & Plan  Chronic, subpar control, as evidenced by a hemoglobin A1C of 7 9  Home regimen includes: 45 units Lantus at bedtime with Apidra 30 units TID with meals  Changes made to the regimen as pt had hypoglycemia episode, monitor closely (reduced from 30U to 20U of lantus and 15 premeals from 18U)  Acute blood loss anemia  Assessment & Plan  · Patient with a hemoglobin 11 4 with acute drop to 8 after surgery  · Hemoglobin remains stable, above 8  · Transfuse for <7  · Iron panel collected; noted to have iron deficiency anemia - receiving IV Venofer  Ok to DC at discharge  Will need OP hematology referral    PAD (peripheral artery disease) ([place] [address] )  Assessment & Plan  · On aspirin, Plavix  · Continue if cleared by Orthopedics  Hyponatremia  Assessment & Plan  · Stable around 133-134  Hydrochlorothiazide has been discontinued  · Multifactorial in setting of IVF/Hctz and pain  · Nephrology following, appreciate input  Hyperlipidemia  Assessment & Plan  · Continue home Lipitor  Hypertension, essential  Assessment & Plan  /64   Pulse 62   Temp 98 7 °F (37 1 °C)   Resp 18   Ht 6' 4\" (1 93 m)   Wt 122 kg (268 lb 15 4 oz)   SpO2 92%   BMI 32 74 kg/m²   · Stable pressures   Continue home meds except HCTZ  · Monitor BP per unit protocol    * Closed fracture of medial " "plateau of right tibia  Assessment & Plan  · S/p reconstruction of proximal tibia today however may require cement spacer as well as possible external fixator placement  · Pain management per primary team  Ortho primary  · Pain better controlled with PO dilaudid as needed         TE Pharmacologic Prophylaxis: Enoxaparin (Lovenox)    Patient Centered Rounds: I have performed bedside rounds with nursing staff today  Discussions with Specialists or Other Care Team Provider: Orthopedics  Education and Discussions with Family / Patient: Patient    Current Length of Stay: 7 day(s)  Current Patient Status: Inpatient     Certification Statement: The patient will continue to require additional inpatient hospital stay due to Closed fracture of medial plateau of right tibia  Discharge Plan / Estimated Discharge Date: Whenever placement can be found    Code Status: Level 1 - Full Code  ______________________________________________________________________________    Subjective:   Patient seen and examined by me  Patient states that he feels better  Pain well controlled  No bleeding  No chest pain, palpitations or diaphoresis  No fever or chills    Objective:   Vitals: Blood pressure 111/64, pulse 62, temperature 98 7 °F (37 1 °C), resp  rate 18, height 6' 4\" (1 93 m), weight 122 kg (268 lb 15 4 oz), SpO2 92 %      Physical Exam:   General appearance: alert, fatigued, appears stated age and cooperative  Constitutional- looks a little weak  HEENT - atraumatic and normocephalic  Neck- supple  Skin - no fresh rash  Extremities no fresh focal deformities  Cardiovascular- S1-S2 heard  Respiratory- bilateral air entry present, no crackles or rhonchi  Skin - no fresh rash  Abdomen - normal bowel sounds present, no rebound tenderness  CNS- No fresh focal deficits  Psych- no acute psychosis     Additional Data:   Labs:  Results from last 7 days   Lab Units 05/07/23  0537 05/05/23  1448 05/05/23  0922 05/04/23  1807 05/04/23  0813 " 05/03/23 2038 05/03/23 0459 05/02/23  0502 05/01/23  1703   WBC Thousand/uL 10 77* 13 73*  --   --  16 19*  --  19 42* 13 40*  --    HEMOGLOBIN g/dL 8 3* 7 9* 8 0* 8 1* 8 0* 8 0* 8 1* 9 1*  --    I STAT HEMOGLOBIN g/dl  --   --   --   --   --   --   --   --  10 2*   HEMATOCRIT % 25 7* 23 9* 24 3* 24 8* 24 4* 24 4* 25 0* 27 9*  --    HEMATOCRIT, ISTAT %  --   --   --   --   --   --   --   --  30*   MCV fL 91 91  --   --  92  --  93 93  --    PLATELETS Thousands/uL 346 293  --   --  263  --  260 288  --      Results from last 7 days   Lab Units 05/07/23  0537 05/06/23  0518 05/05/23 0922 05/04/23 1807 05/04/23 0813 05/03/23 0459 05/02/23  0502 05/01/23  1703   SODIUM mmol/L 133* 134* 132* 131* 132* 129* 134*  --    POTASSIUM mmol/L 3 8 4 0 3 6 3 4* 3 6 3 9 4 0  --    CHLORIDE mmol/L 101 101 100 100 100 99 102  --    CO2 mmol/L 26 26 25 24 24 24 22  --    CO2, I-STAT mmol/L  --   --   --   --   --   --   --  25   ANION GAP mmol/L 6 7 7 7 8 6 10  --    BUN mg/dL 17 21 19 16 19 22 20  --    CREATININE mg/dL 0 68 0 90 0 75 0 68 0 75 0 90 1 04  --    CALCIUM mg/dL 8 4 8 6 8 6 8 5 8 5 8 3* 8 8  --    ALBUMIN g/dL  --   --  3 0*  --   --   --   --   --    TOTAL BILIRUBIN mg/dL  --   --  0 76  --   --   --   --   --    ALK PHOS U/L  --   --  95  --   --   --   --   --    ALT U/L  --   --  5*  --   --   --   --   --    AST U/L  --   --  9*  --   --   --   --   --    EGFR ml/min/1 73sq m 103 92 99 103 99 92 77  --    GLUCOSE RANDOM mg/dL 149* 127 210* 183* 174* 181* 163*  --                       Results from last 7 days   Lab Units 05/08/23  0824 05/07/23 2055 05/07/23  1609 05/07/23  1343 05/07/23  1054 05/07/23  0808 05/06/23  2046 05/06/23  1814 05/06/23  1718 05/06/23  1705 05/06/23  1654 05/06/23  1630   POC GLUCOSE mg/dl 194* 91 164* 229* 240* 191* 128 122 86 68 46* 50*             * I Have Reviewed All Lab Data Listed Above      Cultures:   Results from last 7 days   Lab Units 05/01/23  1358   GRAM STAIN RESULT No Polys or Bacteria seen             Imaging:  Imaging Reports Reviewed Today Include:   XR knee 1 or 2 vw right    Result Date: 5/1/2023  Impression: Fluoroscopic guidance provided for procedure guidance  Please refer to the separate procedure notes for additional details   Workstation performed: [id number]     Scheduled Meds:  Current Facility-Administered Medications   Medication Dose Route Frequency Provider Last Rate   • acetaminophen  650 mg Oral Q8H PRN Ferne Bloch, PA-C     • aspirin  81 mg Oral Daily Ferne Bloch, PA-C     • atorvastatin  40 mg Oral QAM Ferne Bloch, PA-C     • clopidogrel  75 mg Oral QAM Aaron Wright PA-C     • docusate sodium  100 mg Oral BID Ferne Bloch, PA-C     • enoxaparin  40 mg Subcutaneous Daily Ferne Bloch, PA-C     • escitalopram  10 mg Oral Daily Ferne Bloch, PA-C     • HYDROmorphone  2 mg Oral Q6H PRN Jennifer Cartagena PA-C     • HYDROmorphone  4 mg Oral Q6H PRN Jennifer Cartagena PA-C     • HYDROmorphone  0 2 mg Intravenous Q3H PRN Jennifer Cartagena PA-C     • insulin glargine  20 Units Subcutaneous HS Zee Betancourt MD     • insulin lispro  15 Units Subcutaneous TID With Meals Zee Betancourt MD     • insulin lispro  2-12 Units Subcutaneous 4x Daily (AC & HS) Stephenie Ivy MD     • levothyroxine  150 mcg Oral QAM Aaron Wright PA-C     • losartan  50 mg Oral Daily [facility] NAILA Villa     • pantoprazole  40 mg Oral Early Morning Ferne Bloch, PA-C     • senna-docusate sodium  1 tablet Oral Daily Nisreen Villafana PA-C     • tamsulosin  0 4 mg Oral Daily With Je Wright PA-C     • trimethobenzamide  200 mg Intramuscular Q6H PRN NAILA Gaona     • verapamil  360 mg Oral HS Ferne Bloch, PA-C     • zolpidem  10 mg Oral HS PRN Ferne Bloch, PA-C Derotha Settles, MD  [place] [address] Internal Medicine    ** Please Note: This note has been constructed using a voice recognition system   **

## 2023-05-08 NOTE — PLAN OF CARE
Problem: PAIN - ADULT  Goal: Verbalizes/displays adequate comfort level or baseline comfort level  Description: Interventions:  - Encourage patient to monitor pain and request assistance  - Assess pain using appropriate pain scale  - Administer analgesics based on type and severity of pain and evaluate response  - Implement non-pharmacological measures as appropriate and evaluate response  - Consider cultural and social influences on pain and pain management  - Notify physician/advanced practitioner if interventions unsuccessful or patient reports new pain  Outcome: Progressing     Problem: INFECTION - ADULT  Goal: Absence or prevention of progression during hospitalization  Description: INTERVENTIONS:  - Assess and monitor for signs and symptoms of infection  - Monitor lab/diagnostic results  - Monitor all insertion sites, i e  indwelling lines, tubes, and drains  - Monitor endotracheal if appropriate and nasal secretions for changes in amount and color  - [place] appropriate cooling/warming therapies per order  - Administer medications as ordered  - Instruct and encourage patient and family to use good hand hygiene technique  - Identify and instruct in appropriate isolation precautions for identified infection/condition  Outcome: Progressing  Goal: Absence of fever/infection during neutropenic period  Description: INTERVENTIONS:  - Monitor WBC    Outcome: Progressing

## 2023-05-08 NOTE — ASSESSMENT & PLAN NOTE
· S/p reconstruction of proximal tibia today however may require cement spacer as well as possible external fixator placement  · Pain management per primary team  Ortho primary  · Pain better controlled with PO dilaudid as needed

## 2023-05-09 LAB
ANION GAP SERPL CALCULATED.3IONS-SCNC: 6 MMOL/L (ref 4–13)
BUN SERPL-MCNC: 13 MG/DL (ref 5–25)
CALCIUM SERPL-MCNC: 8.3 MG/DL (ref 8.4–10.2)
CHLORIDE SERPL-SCNC: 101 MMOL/L (ref 96–108)
CO2 SERPL-SCNC: 26 MMOL/L (ref 21–32)
CREAT SERPL-MCNC: 0.64 MG/DL (ref 0.6–1.3)
ERYTHROCYTE [DISTWIDTH] IN BLOOD BY AUTOMATED COUNT: 13.2 % (ref 11.6–15.1)
GFR SERPL CREATININE-BSD FRML MDRD: 106 ML/MIN/1.73SQ M
GLUCOSE SERPL-MCNC: 108 MG/DL (ref 65–140)
GLUCOSE SERPL-MCNC: 125 MG/DL (ref 65–140)
GLUCOSE SERPL-MCNC: 144 MG/DL (ref 65–140)
GLUCOSE SERPL-MCNC: 161 MG/DL (ref 65–140)
GLUCOSE SERPL-MCNC: 164 MG/DL (ref 65–140)
GLUCOSE SERPL-MCNC: 213 MG/DL (ref 65–140)
HCT VFR BLD AUTO: 24.7 % (ref 36.5–49.3)
HGB BLD-MCNC: 8 G/DL (ref 12–17)
MCH RBC QN AUTO: 29.3 PG (ref 26.8–34.3)
MCHC RBC AUTO-ENTMCNC: 32.4 G/DL (ref 31.4–37.4)
MCV RBC AUTO: 91 FL (ref 82–98)
PLATELET # BLD AUTO: 391 THOUSANDS/UL (ref 149–390)
PMV BLD AUTO: 10.4 FL (ref 8.9–12.7)
POTASSIUM SERPL-SCNC: 3.6 MMOL/L (ref 3.5–5.3)
RBC # BLD AUTO: 2.73 MILLION/UL (ref 3.88–5.62)
SARS-COV-2 RNA RESP QL NAA+PROBE: NEGATIVE
SODIUM SERPL-SCNC: 133 MMOL/L (ref 135–147)
WBC # BLD AUTO: 10.06 THOUSAND/UL (ref 4.31–10.16)

## 2023-05-09 RX ORDER — POLYETHYLENE GLYCOL 3350 17 G/17G
17 POWDER, FOR SOLUTION ORAL ONCE
Status: COMPLETED | OUTPATIENT
Start: 2023-05-09 | End: 2023-05-09

## 2023-05-09 RX ORDER — SODIUM PHOSPHATE, DIBASIC AND SODIUM PHOSPHATE, MONOBASIC 7; 19 G/133ML; G/133ML
1 ENEMA RECTAL ONCE
Status: COMPLETED | OUTPATIENT
Start: 2023-05-09 | End: 2023-05-09

## 2023-05-09 RX ADMIN — CLOPIDOGREL BISULFATE 75 MG: 75 TABLET ORAL at 08:33

## 2023-05-09 RX ADMIN — ACETAMINOPHEN 650 MG: 325 TABLET ORAL at 15:51

## 2023-05-09 RX ADMIN — INSULIN LISPRO 15 UNITS: 100 INJECTION, SOLUTION INTRAVENOUS; SUBCUTANEOUS at 08:35

## 2023-05-09 RX ADMIN — INSULIN GLARGINE 20 UNITS: 100 INJECTION, SOLUTION SUBCUTANEOUS at 22:22

## 2023-05-09 RX ADMIN — DOCUSATE SODIUM 100 MG: 100 CAPSULE, LIQUID FILLED ORAL at 08:33

## 2023-05-09 RX ADMIN — LEVOTHYROXINE SODIUM 150 MCG: 150 TABLET ORAL at 08:33

## 2023-05-09 RX ADMIN — TRIMETHOBENZAMIDE HYDROCHLORIDE 200 MG: 100 INJECTION INTRAMUSCULAR at 12:47

## 2023-05-09 RX ADMIN — SODIUM PHOSPHATE 1 ENEMA: 7; 19 ENEMA RECTAL at 10:40

## 2023-05-09 RX ADMIN — PANTOPRAZOLE SODIUM 40 MG: 40 TABLET, DELAYED RELEASE ORAL at 05:30

## 2023-05-09 RX ADMIN — HYDROMORPHONE HYDROCHLORIDE 4 MG: 4 TABLET ORAL at 22:21

## 2023-05-09 RX ADMIN — INSULIN LISPRO 10 UNITS: 100 INJECTION, SOLUTION INTRAVENOUS; SUBCUTANEOUS at 19:28

## 2023-05-09 RX ADMIN — TAMSULOSIN HYDROCHLORIDE 0.4 MG: 0.4 CAPSULE ORAL at 17:46

## 2023-05-09 RX ADMIN — ATORVASTATIN CALCIUM 40 MG: 40 TABLET, FILM COATED ORAL at 08:33

## 2023-05-09 RX ADMIN — INSULIN LISPRO 15 UNITS: 100 INJECTION, SOLUTION INTRAVENOUS; SUBCUTANEOUS at 12:34

## 2023-05-09 RX ADMIN — ASPIRIN 81 MG: 81 TABLET, CHEWABLE ORAL at 08:32

## 2023-05-09 RX ADMIN — POLYETHYLENE GLYCOL 3350 17 G: 17 POWDER, FOR SOLUTION ORAL at 08:30

## 2023-05-09 RX ADMIN — ZOLPIDEM TARTRATE 10 MG: 5 TABLET, COATED ORAL at 22:21

## 2023-05-09 RX ADMIN — INSULIN LISPRO 2 UNITS: 100 INJECTION, SOLUTION INTRAVENOUS; SUBCUTANEOUS at 22:22

## 2023-05-09 RX ADMIN — INSULIN LISPRO 2 UNITS: 100 INJECTION, SOLUTION INTRAVENOUS; SUBCUTANEOUS at 08:34

## 2023-05-09 RX ADMIN — INSULIN LISPRO 4 UNITS: 100 INJECTION, SOLUTION INTRAVENOUS; SUBCUTANEOUS at 12:33

## 2023-05-09 RX ADMIN — HYDROMORPHONE HYDROCHLORIDE 4 MG: 4 TABLET ORAL at 12:47

## 2023-05-09 RX ADMIN — SENNOSIDES AND DOCUSATE SODIUM 1 TABLET: 50; 8.6 TABLET ORAL at 08:32

## 2023-05-09 RX ADMIN — ENOXAPARIN SODIUM 40 MG: 40 INJECTION SUBCUTANEOUS at 05:30

## 2023-05-09 NOTE — ASSESSMENT & PLAN NOTE
· Patient with a hemoglobin 11 4 with acute drop to 8 after surgery  · Hemoglobin remains stable, above 8  · Transfuse for hemoglobin less than 7

## 2023-05-09 NOTE — ASSESSMENT & PLAN NOTE
· S/p reconstruction of proximal tibia today however may require cement spacer as well as possible external fixator placement  · Pain management per primary team  Ortho primary  · Pain control per primary  · DVT prophylaxis Lovenox

## 2023-05-09 NOTE — ASSESSMENT & PLAN NOTE
"BP 93/60   Pulse 75   Temp 98 4 °F (36 9 °C)   Resp 18   Ht 6' 4\" (1 93 m)   Wt 122 kg (268 lb 15 4 oz)   SpO2 100%   BMI 32 74 kg/m²   · Continue verapamil, hold losartan/HCTZ due to soft blood pressure  "

## 2023-05-09 NOTE — PROGRESS NOTES
Progress Note - Orthopedics   Joana Jeffers 59 y o  male MRN: [id number]  Unit/Bed#: -01      Subjective:    59 y o male POD#8 s/p ORIF Right tibial plateau nonunion  Patient is resting comfortably in hospital bed in no acute distress  Patient denies any numbness or tingling in right lower extremity  Patient has maintained nonweightbearing status with knee immobilizer  Patient denies any fevers or chills  Patient denies any shortness of breath, chest pain, lightheadedness, dizziness, nausea, and vomiting  Patient reports he had a bowel movement last night after getting an enema  Patient is awaiting rehab placement at this time  Patient offers no additional complaints      Labs:  0   Lab Value Date/Time    HCT 24 7 (L) 05/09/2023 0522    HCT 25 7 (L) 05/07/2023 0537    HCT 23 9 (L) 05/05/2023 1448    HGB 8 0 (L) 05/09/2023 0522    HGB 8 3 (L) 05/07/2023 0537    HGB 7 9 (L) 05/05/2023 1448    INR 1 04 08/04/2020 1358    WBC 10 06 05/09/2023 0522    WBC 10 77 (H) 05/07/2023 0537    WBC 13 73 (H) 05/05/2023 1448    ESR 24 (H) 02/21/2020 1101       Meds:    Current Facility-Administered Medications:   •  acetaminophen (TYLENOL) tablet 650 mg, 650 mg, Oral, Q8H PRN, Mingon Reagan PA-C, 650 mg at 05/04/23 2110  •  aspirin chewable tablet 81 mg, 81 mg, Oral, Daily, Mignon Reagan PA-C, 81 mg at 05/08/23 5606  •  atorvastatin (LIPITOR) tablet 40 mg, 40 mg, Oral, QAM, Aaron Wright PA-C, 40 mg at 05/08/23 6228  •  clopidogrel (PLAVIX) tablet 75 mg, 75 mg, Oral, QAM, Aaron Wright PA-C, 75 mg at 05/08/23 8279  •  docusate sodium (COLACE) capsule 100 mg, 100 mg, Oral, BID, Aaron Wright PA-C, 100 mg at 05/08/23 1706  •  enoxaparin (LOVENOX) subcutaneous injection 40 mg, 40 mg, Subcutaneous, Daily, Aaron Wright PA-C, 40 mg at 05/09/23 0530  •  escitalopram (LEXAPRO) tablet 10 mg, 10 mg, Oral, Daily, Aaron Wright PA-C, 10 mg at 05/07/23 0957  •  HYDROmorphone (DILAUDID) tablet 2 mg, 2 mg, Oral, Q6H PRN, Ana Don PA-C  •  HYDROmorphone (DILAUDID) tablet 4 mg, 4 mg, Oral, Q6H PRN, Ana Don PA-C, 4 mg at 05/08/23 2151  •  HYDROmorphone HCl (DILAUDID) injection 0 2 mg, 0 2 mg, Intravenous, Q3H PRN, Ana Don PA-C, 0 2 mg at 05/02/23 0405  •  insulin glargine (LANTUS) subcutaneous injection 20 Units 0 2 mL, 20 Units, Subcutaneous, HS, Melvin King MD, 20 Units at 05/08/23 2152  •  insulin lispro (HumaLOG) 100 units/mL subcutaneous injection 15 Units, 15 Units, Subcutaneous, TID With Meals, Melvin King MD, 15 Units at 05/08/23 1214  •  insulin lispro (HumaLOG) 100 units/mL subcutaneous injection 2-12 Units, 2-12 Units, Subcutaneous, 4x Daily (AC & HS), 2 Units at 05/08/23 2157 **AND** Fingerstick Glucose (POCT), , , 4x Daily AC and at bedtime, Ashley Rojo MD  •  levothyroxine tablet 150 mcg, 150 mcg, Oral, QAM, Aaron Wright PA-C, 150 mcg at 05/08/23 7405  •  losartan (COZAAR) tablet 50 mg, 50 mg, Oral, Daily, 50 mg at 05/08/23 4727 **AND** [DISCONTINUED] hydrochlorothiazide (HYDRODIURIL) tablet 12 5 mg, 12 5 mg, Oral, Daily, [facility] NAILA Villa  •  magnesium hydroxide (MILK OF MAGNESIA) oral suspension 30 mL, 30 mL, Oral, Daily PRN, Casie Gilman MD, 30 mL at 05/08/23 1414  •  pantoprazole (PROTONIX) EC tablet 40 mg, 40 mg, Oral, Early Morning, Aaron Wright PA-C, 40 mg at 05/09/23 0530  •  senna-docusate sodium (SENOKOT S) 8 6-50 mg per tablet 1 tablet, 1 tablet, Oral, Daily, Nisreen Giang PA-C, 1 tablet at 05/08/23 0164  •  tamsulosin (FLOMAX) capsule 0 4 mg, 0 4 mg, Oral, Daily With Dinner, Morena Lyman PA-C, 0 4 mg at 05/08/23 1706  •  trimethobenzamide (TIGAN) IM injection 200 mg, 200 mg, Intramuscular, Q6H PRN, Shellie Field, CRNP, 200 mg at 05/08/23 1448  •  verapamil (CALAN-SR) CR tablet 360 mg, 360 mg, Oral, HS, Aaron Wright PA-C, 360 mg at 05/08/23 2152  •  zolpidem (AMBIEN) tablet 10 mg, 10 mg, Oral, HS PRN, Abeba Gastelum PA-C, 10 mg at 05/08/23 2152    Blood Culture:   Lab Results   Component Value Date    BLOODCX No Growth After 5 Days  10/10/2020    BLOODCX No Growth After 5 Days  10/10/2020       Wound Culture:   Lab Results   Component Value Date    WOUNDCULT 2+ Growth of Morganella morganii (A) 08/30/2020    WOUNDCULT (A) 08/30/2020     2+ Growth of Methicillin Resistant Staphylococcus aureus    WOUNDCULT 1+ Growth of Enterobacter cloacae complex (A) 08/30/2020    WOUNDCULT 1+ Growth of Enterococcus faecalis (A) 08/30/2020       Ins and Outs:  I/O last 24 hours: In: -   Out: 100 [Urine:100]          Physical:  Vitals:    05/08/23 2316   BP: 123/72   Pulse: 75   Resp:    Temp: 99 3 °F (37 4 °C)   SpO2: 100%     Musculoskeletal:  Right lower extremity  • Patient is a 25-year-old male laying comfortably in hospital bed in no acute distress  • Knee immobilizer intact upon evaluation  • Dressings are clean dry and intact without soilage present  • TTP throughout Right lower extremity  • Sensation intact to saphenous, sural, tibial, superficial peroneal nerve, and deep peroneal  • Motor intact to ankle dorsi/plantar flexion  • 2+ DP pulse, symmetric bilaterally  • Extremity warm and well perfused  • Calf supple and nontender    Assessment:    58 y o male POD#8 s/p ORIF Right tibial plateau nonunion    Plan:  • Nonweightbearing Right lower extremity with knee immobilizer intact at all times   • Will monitor for ABLA and administer IVF/prbc as indicated for Greater than 2 gram drop or Hgb < 7  Hgb of 8 0 today  • PT/OT- up and out of bed, gait training while maintaining NWB status Right lower extremity  • Pain control  • Patient may shower at this time and change dressings as needed  • DVT ppx- Lovenox 40mg once daily + Plavix   • Dispo: Awaiting authorization for rehab facility at this time  Discharge planning today pending authorization  See above for additional details       Miranda Mccray GABRIEL

## 2023-05-09 NOTE — PLAN OF CARE
Problem: PAIN - ADULT  Goal: Verbalizes/displays adequate comfort level or baseline comfort level  Description: Interventions:  - Encourage patient to monitor pain and request assistance  - Assess pain using appropriate pain scale  - Administer analgesics based on type and severity of pain and evaluate response  - Implement non-pharmacological measures as appropriate and evaluate response  - Consider cultural and social influences on pain and pain management  - Notify physician/advanced practitioner if interventions unsuccessful or patient reports new pain  Outcome: Progressing     Problem: INFECTION - ADULT  Goal: Absence or prevention of progression during hospitalization  Description: INTERVENTIONS:  - Assess and monitor for signs and symptoms of infection  - Monitor lab/diagnostic results  - Monitor all insertion sites, i e  indwelling lines, tubes, and drains  - Monitor endotracheal if appropriate and nasal secretions for changes in amount and color  - [place] appropriate cooling/warming therapies per order  - Administer medications as ordered  - Instruct and encourage patient and family to use good hand hygiene technique  - Identify and instruct in appropriate isolation precautions for identified infection/condition  Outcome: Progressing  Goal: Absence of fever/infection during neutropenic period  Description: INTERVENTIONS:  - Monitor WBC    Outcome: Progressing     Problem: SAFETY ADULT  Goal: Patient will remain free of falls  Description: INTERVENTIONS:  - Educate patient/family on patient safety including physical limitations  - Instruct patient to call for assistance with activity   - Consult OT/PT to assist with strengthening/mobility   - Keep Call bell within reach  - Keep bed low and locked with side rails adjusted as appropriate  - Keep care items and personal belongings within reach  - Initiate and maintain comfort rounds  - Make Fall Risk Sign visible to staff  - Offer Toileting every  Hours, in advance of need  - Initiate/Maintain alarm  - Obtain necessary fall risk management equipment:   - Apply yellow socks and bracelet for high fall risk patients  - Consider moving patient to room near nurses station  Outcome: Progressing  Goal: Maintain or return to baseline ADL function  Description: INTERVENTIONS:  -  Assess patient's ability to carry out ADLs; assess patient's baseline for ADL function and identify physical deficits which impact ability to perform ADLs (bathing, care of mouth/teeth, toileting, grooming, dressing, etc )  - Assess/evaluate cause of self-care deficits   - Assess range of motion  - Assess patient's mobility; develop plan if impaired  - Assess patient's need for assistive devices and provide as appropriate  - Encourage maximum independence but intervene and supervise when necessary  - Involve family in performance of ADLs  - Assess for home care needs following discharge   - Consider OT consult to assist with ADL evaluation and planning for discharge  - Provide patient education as appropriate  Outcome: Progressing  Goal: Maintains/Returns to pre admission functional level  Description: INTERVENTIONS:  - Perform BMAT or MOVE assessment daily    - Set and communicate daily mobility goal to care team and patient/family/caregiver  - Collaborate with rehabilitation services on mobility goals if consulted  - Perform Range of Motion  times a day  - Reposition patient every  hours    - Dangle patient  times a day  - Stand patient  times a day  - Ambulate patient  times a day  - Out of bed to chair  times a day   - Out of bed for meals  times a day  - Out of bed for toileting  - Record patient progress and toleration of activity level   Outcome: Progressing

## 2023-05-09 NOTE — CASE MANAGEMENT
[facility]  received notification that Acute Rehab authorization is pending >24 hours  Authorization was initially submitted to insurance: [employer]    Via:   fax  Date: 5/6  Time: 9:48 AM     Pending ref# if applicable: [id number]   (obtained by calling in to check auth status 5/8)  For facility:    [facility]     Action Taken:  [employer] requesting timeframe/possible expedite of auth determination to avoid a further delay in discharge  Notified DCS Staff and Care Manager: Donavon Sheehan of update

## 2023-05-09 NOTE — CASE MANAGEMENT
Received call from Usha at [facility] requesting updated PT/OT notes for pending acute rehab auth  request to fax # [phone]  Sent as requested  CM notified

## 2023-05-09 NOTE — PLAN OF CARE
Problem: PAIN - ADULT  Goal: Verbalizes/displays adequate comfort level or baseline comfort level  Description: Interventions:  - Encourage patient to monitor pain and request assistance  - Assess pain using appropriate pain scale  - Administer analgesics based on type and severity of pain and evaluate response  - Implement non-pharmacological measures as appropriate and evaluate response  - Consider cultural and social influences on pain and pain management  - Notify physician/advanced practitioner if interventions unsuccessful or patient reports new pain  Outcome: Progressing     Problem: INFECTION - ADULT  Goal: Absence or prevention of progression during hospitalization  Description: INTERVENTIONS:  - Assess and monitor for signs and symptoms of infection  - Monitor lab/diagnostic results  - Monitor all insertion sites, i e  indwelling lines, tubes, and drains  - Monitor endotracheal if appropriate and nasal secretions for changes in amount and color  - [place] appropriate cooling/warming therapies per order  - Administer medications as ordered  - Instruct and encourage patient and family to use good hand hygiene technique  - Identify and instruct in appropriate isolation precautions for identified infection/condition  Outcome: Progressing  Goal: Absence of fever/infection during neutropenic period  Description: INTERVENTIONS:  - Monitor WBC    Outcome: Progressing     Problem: SAFETY ADULT  Goal: Patient will remain free of falls  Description: INTERVENTIONS:  - Educate patient/family on patient safety including physical limitations  - Instruct patient to call for assistance with activity   - Consult OT/PT to assist with strengthening/mobility   - Keep Call bell within reach  - Keep bed low and locked with side rails adjusted as appropriate  - Keep care items and personal belongings within reach  - Initiate and maintain comfort rounds  - Make Fall Risk Sign visible to staff  - Offer Toileting every  Hours, in advance of need  - Initiate/Maintain alarm  - Obtain necessary fall risk management equipment:   - Apply yellow socks and bracelet for high fall risk patients  - Consider moving patient to room near nurses station  Outcome: Progressing  Goal: Maintain or return to baseline ADL function  Description: INTERVENTIONS:  -  Assess patient's ability to carry out ADLs; assess patient's baseline for ADL function and identify physical deficits which impact ability to perform ADLs (bathing, care of mouth/teeth, toileting, grooming, dressing, etc )  - Assess/evaluate cause of self-care deficits   - Assess range of motion  - Assess patient's mobility; develop plan if impaired  - Assess patient's need for assistive devices and provide as appropriate  - Encourage maximum independence but intervene and supervise when necessary  - Involve family in performance of ADLs  - Assess for home care needs following discharge   - Consider OT consult to assist with ADL evaluation and planning for discharge  - Provide patient education as appropriate  Outcome: Progressing  Goal: Maintains/Returns to pre admission functional level  Description: INTERVENTIONS:  - Perform BMAT or MOVE assessment daily    - Set and communicate daily mobility goal to care team and patient/family/caregiver  - Collaborate with rehabilitation services on mobility goals if consulted  - Perform Range of Motion  times a day  - Reposition patient every  hours    - Dangle patient  times a day  - Stand patient  times a day  - Ambulate patient  times a day  - Out of bed to chair  times a day   - Out of bed for meals  times a day  - Out of bed for toileting  - Record patient progress and toleration of activity level   Outcome: Progressing     Problem: DISCHARGE PLANNING  Goal: Discharge to home or other facility with appropriate resources  Description: INTERVENTIONS:  - Identify barriers to discharge w/patient and caregiver  - Arrange for needed discharge resources and transportation as appropriate  - Identify discharge learning needs (meds, wound care, etc )  - Arrange for interpretive services to assist at discharge as needed  - Refer to Case Management Department for coordinating discharge planning if the patient needs post-hospital services based on physician/advanced practitioner order or complex needs related to functional status, cognitive ability, or social support system  Outcome: Progressing     Problem: SAFETY ADULT  Goal: Patient will remain free of falls  Description: INTERVENTIONS:  - Educate patient/family on patient safety including physical limitations  - Instruct patient to call for assistance with activity   - Consult OT/PT to assist with strengthening/mobility   - Keep Call bell within reach  - Keep bed low and locked with side rails adjusted as appropriate  - Keep care items and personal belongings within reach  - Initiate and maintain comfort rounds  - Make Fall Risk Sign visible to staff  - Offer Toileting every  Hours, in advance of need  - Initiate/Maintain alarm  - Obtain necessary fall risk management equipment:   - Apply yellow socks and bracelet for high fall risk patients  - Consider moving patient to room near nurses station  Outcome: Progressing  Goal: Maintain or return to baseline ADL function  Description: INTERVENTIONS:  -  Assess patient's ability to carry out ADLs; assess patient's baseline for ADL function and identify physical deficits which impact ability to perform ADLs (bathing, care of mouth/teeth, toileting, grooming, dressing, etc )  - Assess/evaluate cause of self-care deficits   - Assess range of motion  - Assess patient's mobility; develop plan if impaired  - Assess patient's need for assistive devices and provide as appropriate  - Encourage maximum independence but intervene and supervise when necessary  - Involve family in performance of ADLs  - Assess for home care needs following discharge   - Consider OT consult to assist with ADL evaluation and planning for discharge  - Provide patient education as appropriate  Outcome: Progressing  Goal: Maintains/Returns to pre admission functional level  Description: INTERVENTIONS:  - Perform BMAT or MOVE assessment daily    - Set and communicate daily mobility goal to care team and patient/family/caregiver  - Collaborate with rehabilitation services on mobility goals if consulted  - Perform Range of Motion  times a day  - Reposition patient every  hours  - Dangle patient  times a day  - Stand patient  times a day  - Ambulate patient  times a day  - Out of bed to chair  times a day   - Out of bed for meals  times a day  - Out of bed for toileting  - Record patient progress and toleration of activity level   Outcome: Progressing     Problem: Knowledge Deficit  Goal: Patient/family/caregiver demonstrates understanding of disease process, treatment plan, medications, and discharge instructions  Description: Complete learning assessment and assess knowledge base    Interventions:  - Provide teaching at level of understanding  - Provide teaching via preferred learning methods  Outcome: Progressing     Problem: SKIN/TISSUE INTEGRITY - ADULT  Goal: Skin Integrity remains intact(Skin Breakdown Prevention)  Description: Assess:  -Perform Fabrice assessment every   -Clean and moisturize skin every   -Inspect skin when repositioning, toileting, and assisting with ADLS  -Assess under medical devices such as  every   -Assess extremities for adequate circulation and sensation     Bed Management:  -Have minimal linens on bed & keep smooth, unwrinkled  -Change linens as needed when moist or perspiring  -Avoid sitting or lying in one position for more than  hours while in bed  -Keep HOB at degrees     Toileting:  -Offer bedside commode  -Assess for incontinence every   -Use incontinent care products after each incontinent episode such as     Activity:  -Mobilize patient  times a day  -Encourage activity and walks on unit  -Encourage or provide ROM exercises   -Turn and reposition patient every  Hours  -Use appropriate equipment to lift or move patient in bed  -Instruct/ Assist with weight shifting every  when out of bed in chair  -Consider limitation of chair time  hour intervals    Skin Care:  -Avoid use of baby powder, tape, friction and shearing, hot water or constrictive clothing  -Relieve pressure over bony prominences using   -Do not massage red bony areas    Next Steps:  -Teach patient strategies to minimize risks such as    -Consider consults to  interdisciplinary teams such as   Outcome: Progressing  Goal: Incision(s), wounds(s) or drain site(s) healing without S/S of infection  Description: INTERVENTIONS  - Assess and document dressing, incision, wound bed, drain sites and surrounding tissue  - Provide patient and family education  - Perform skin care/dressing changes every   Outcome: Progressing  Goal: Pressure injury heals and does not worsen  Description: Interventions:  - Implement low air loss mattress or specialty surface (Criteria met)  - Apply silicone foam dressing  - Instruct/assist with weight shifting every  minutes when in chair   - Limit chair time to  hour intervals  - Use special pressure reducing interventions such as  when in chair   - Apply fecal or urinary incontinence containment device   - Perform passive or active ROM every   - Turn and reposition patient & offload bony prominences every  hours   - Utilize friction reducing device or surface for transfers   - Consider consults to  interdisciplinary teams such as   - Use incontinent care products after each incontinent episode such as   - Consider nutrition services referral as needed  Outcome: Progressing     Problem: Prexisting or High Potential for Compromised Skin Integrity  Goal: Skin integrity is maintained or improved  Description: INTERVENTIONS:  - Identify patients at risk for skin breakdown  - Assess and monitor skin integrity  - Assess and monitor nutrition and hydration status  - Monitor labs   - Assess for incontinence   - Turn and reposition patient  - Assist with mobility/ambulation  - Relieve pressure over bony prominences  - Avoid friction and shearing  - Provide appropriate hygiene as needed including keeping skin clean and dry  - Evaluate need for skin moisturizer/barrier cream  - Collaborate with interdisciplinary team   - Patient/family teaching  - Consider wound care consult   Outcome: Progressing     Problem: MOBILITY - ADULT  Goal: Maintain or return to baseline ADL function  Description: INTERVENTIONS:  -  Assess patient's ability to carry out ADLs; assess patient's baseline for ADL function and identify physical deficits which impact ability to perform ADLs (bathing, care of mouth/teeth, toileting, grooming, dressing, etc )  - Assess/evaluate cause of self-care deficits   - Assess range of motion  - Assess patient's mobility; develop plan if impaired  - Assess patient's need for assistive devices and provide as appropriate  - Encourage maximum independence but intervene and supervise when necessary  - Involve family in performance of ADLs  - Assess for home care needs following discharge   - Consider OT consult to assist with ADL evaluation and planning for discharge  - Provide patient education as appropriate  Outcome: Progressing  Goal: Maintains/Returns to pre admission functional level  Description: INTERVENTIONS:  - Perform BMAT or MOVE assessment daily    - Set and communicate daily mobility goal to care team and patient/family/caregiver  - Collaborate with rehabilitation services on mobility goals if consulted  - Perform Range of Motion  times a day  - Reposition patient every  hours    - Dangle patient  times a day  - Stand patient  times a day  - Ambulate patient  times a day  - Out of bed to chair  times a day   - Out of bed for meals times a day  - Out of bed for toileting  - Record patient progress and toleration of activity level   Outcome: Progressing

## 2023-05-09 NOTE — ASSESSMENT & PLAN NOTE
Chronic, subpar control, as evidenced by a hemoglobin A1C of 7 9  Home regimen includes: 45 units Lantus at bedtime with Apidra 30 units TID with meals  Blood glucose improved, continue Lantus 20 units bedtime and 15 units Humalog 3 times daily with meals

## 2023-05-10 ENCOUNTER — HOSPITAL ENCOUNTER (INPATIENT)
Facility: HOSPITAL | Age: 58
LOS: 20 days | Discharge: HOME HEALTH | DRG: 464 | End: 2023-05-30
Attending: FAMILY MEDICINE | Admitting: FAMILY MEDICINE
Payer: MEDICARE

## 2023-05-10 VITALS
TEMPERATURE: 97.7 F | HEART RATE: 86 BPM | SYSTOLIC BLOOD PRESSURE: 109 MMHG | HEIGHT: 76 IN | DIASTOLIC BLOOD PRESSURE: 71 MMHG | WEIGHT: 268.96 LBS | BODY MASS INDEX: 32.75 KG/M2 | RESPIRATION RATE: 18 BRPM | OXYGEN SATURATION: 93 %

## 2023-05-10 DIAGNOSIS — S91.302A: ICD-10-CM

## 2023-05-10 DIAGNOSIS — E11.42 TYPE 2 DIABETES MELLITUS WITH DIABETIC POLYNEUROPATHY, WITH LONG-TERM CURRENT USE OF INSULIN (HCC): ICD-10-CM

## 2023-05-10 DIAGNOSIS — I73.9 PAD (PERIPHERAL ARTERY DISEASE) (HCC): ICD-10-CM

## 2023-05-10 DIAGNOSIS — S91.339A PENETRATING FOOT WOUND: ICD-10-CM

## 2023-05-10 DIAGNOSIS — E03.9 ACQUIRED HYPOTHYROIDISM: ICD-10-CM

## 2023-05-10 DIAGNOSIS — G47.33 OSA (OBSTRUCTIVE SLEEP APNEA): Primary | ICD-10-CM

## 2023-05-10 DIAGNOSIS — S82.131S CLOSED FRACTURE OF MEDIAL PORTION OF RIGHT TIBIAL PLATEAU, SEQUELA: ICD-10-CM

## 2023-05-10 DIAGNOSIS — F32.A DEPRESSION, UNSPECIFIED DEPRESSION TYPE: ICD-10-CM

## 2023-05-10 DIAGNOSIS — Z79.4 TYPE 2 DIABETES MELLITUS WITH DIABETIC POLYNEUROPATHY, WITH LONG-TERM CURRENT USE OF INSULIN (HCC): ICD-10-CM

## 2023-05-10 LAB
GLUCOSE SERPL-MCNC: 120 MG/DL (ref 65–140)
GLUCOSE SERPL-MCNC: 165 MG/DL (ref 65–140)
GLUCOSE SERPL-MCNC: 204 MG/DL (ref 65–140)
GLUCOSE SERPL-MCNC: 218 MG/DL (ref 65–140)

## 2023-05-10 PROCEDURE — 82948 REAGENT STRIP/BLOOD GLUCOSE: CPT

## 2023-05-10 RX ORDER — ACETAMINOPHEN 325 MG/1
650 TABLET ORAL EVERY 8 HOURS PRN
Status: DISCONTINUED | OUTPATIENT
Start: 2023-05-10 | End: 2023-05-13

## 2023-05-10 RX ORDER — HYDROMORPHONE HYDROCHLORIDE 2 MG/1
2 TABLET ORAL EVERY 6 HOURS PRN
Qty: 28 TABLET | Refills: 0 | Status: ON HOLD | OUTPATIENT
Start: 2023-05-10 | End: 2023-05-20

## 2023-05-10 RX ORDER — ACETAMINOPHEN 325 MG/1
650 TABLET ORAL EVERY 8 HOURS PRN
Qty: 60 TABLET | Refills: 0 | Status: ON HOLD | OUTPATIENT
Start: 2023-05-10

## 2023-05-10 RX ORDER — LEVOTHYROXINE SODIUM 0.07 MG/1
150 TABLET ORAL
Status: DISCONTINUED | OUTPATIENT
Start: 2023-05-11 | End: 2023-05-16

## 2023-05-10 RX ORDER — ZOLPIDEM TARTRATE 5 MG/1
10 TABLET ORAL
Status: DISCONTINUED | OUTPATIENT
Start: 2023-05-10 | End: 2023-05-30 | Stop reason: HOSPADM

## 2023-05-10 RX ORDER — PANTOPRAZOLE SODIUM 40 MG/1
40 TABLET, DELAYED RELEASE ORAL
Status: DISCONTINUED | OUTPATIENT
Start: 2023-05-11 | End: 2023-05-30 | Stop reason: HOSPADM

## 2023-05-10 RX ORDER — ASPIRIN 81 MG/1
81 TABLET, CHEWABLE ORAL DAILY
Status: DISCONTINUED | OUTPATIENT
Start: 2023-05-11 | End: 2023-05-17

## 2023-05-10 RX ORDER — ATORVASTATIN CALCIUM 40 MG/1
40 TABLET, FILM COATED ORAL EVERY MORNING
Status: DISCONTINUED | OUTPATIENT
Start: 2023-05-11 | End: 2023-05-30 | Stop reason: HOSPADM

## 2023-05-10 RX ORDER — AMOXICILLIN 250 MG
1 CAPSULE ORAL DAILY
Status: DISCONTINUED | OUTPATIENT
Start: 2023-05-11 | End: 2023-05-30 | Stop reason: HOSPADM

## 2023-05-10 RX ORDER — HYDROMORPHONE HYDROCHLORIDE 2 MG/1
2 TABLET ORAL EVERY 6 HOURS PRN
Status: DISCONTINUED | OUTPATIENT
Start: 2023-05-10 | End: 2023-05-11

## 2023-05-10 RX ORDER — CLOPIDOGREL BISULFATE 75 MG/1
75 TABLET ORAL EVERY MORNING
Status: DISCONTINUED | OUTPATIENT
Start: 2023-05-11 | End: 2023-05-17

## 2023-05-10 RX ORDER — ONDANSETRON 4 MG/1
4 TABLET, ORALLY DISINTEGRATING ORAL EVERY 6 HOURS PRN
Status: DISCONTINUED | OUTPATIENT
Start: 2023-05-10 | End: 2023-05-30 | Stop reason: HOSPADM

## 2023-05-10 RX ORDER — INSULIN LISPRO 100 [IU]/ML
15 INJECTION, SOLUTION INTRAVENOUS; SUBCUTANEOUS
Status: DISCONTINUED | OUTPATIENT
Start: 2023-05-10 | End: 2023-05-17

## 2023-05-10 RX ORDER — DOCUSATE SODIUM 100 MG/1
100 CAPSULE, LIQUID FILLED ORAL 2 TIMES DAILY
Status: DISCONTINUED | OUTPATIENT
Start: 2023-05-10 | End: 2023-05-12

## 2023-05-10 RX ORDER — TAMSULOSIN HYDROCHLORIDE 0.4 MG/1
0.4 CAPSULE ORAL
Status: DISCONTINUED | OUTPATIENT
Start: 2023-05-10 | End: 2023-05-30 | Stop reason: HOSPADM

## 2023-05-10 RX ORDER — ESCITALOPRAM OXALATE 10 MG/1
10 TABLET ORAL DAILY
Status: DISCONTINUED | OUTPATIENT
Start: 2023-05-11 | End: 2023-05-11

## 2023-05-10 RX ORDER — INSULIN LISPRO 100 [IU]/ML
2-12 INJECTION, SOLUTION INTRAVENOUS; SUBCUTANEOUS
Status: DISCONTINUED | OUTPATIENT
Start: 2023-05-10 | End: 2023-05-30 | Stop reason: HOSPADM

## 2023-05-10 RX ORDER — ENOXAPARIN SODIUM 100 MG/ML
40 INJECTION SUBCUTANEOUS DAILY
Qty: 13.2 ML | Refills: 0 | Status: ON HOLD | OUTPATIENT
Start: 2023-05-11 | End: 2023-06-13

## 2023-05-10 RX ORDER — INSULIN GLARGINE 100 [IU]/ML
20 INJECTION, SOLUTION SUBCUTANEOUS
Status: DISCONTINUED | OUTPATIENT
Start: 2023-05-10 | End: 2023-05-16

## 2023-05-10 RX ORDER — HYDROMORPHONE HYDROCHLORIDE 2 MG/1
4 TABLET ORAL EVERY 6 HOURS PRN
Status: DISCONTINUED | OUTPATIENT
Start: 2023-05-10 | End: 2023-05-11

## 2023-05-10 RX ORDER — ENOXAPARIN SODIUM 100 MG/ML
40 INJECTION SUBCUTANEOUS DAILY
Status: DISCONTINUED | OUTPATIENT
Start: 2023-05-11 | End: 2023-05-17

## 2023-05-10 RX ADMIN — INSULIN LISPRO 2 UNITS: 100 INJECTION, SOLUTION INTRAVENOUS; SUBCUTANEOUS at 17:11

## 2023-05-10 RX ADMIN — LEVOTHYROXINE SODIUM 150 MCG: 150 TABLET ORAL at 08:43

## 2023-05-10 RX ADMIN — SENNOSIDES AND DOCUSATE SODIUM 1 TABLET: 50; 8.6 TABLET ORAL at 08:42

## 2023-05-10 RX ADMIN — INSULIN LISPRO 15 UNITS: 100 INJECTION, SOLUTION INTRAVENOUS; SUBCUTANEOUS at 17:10

## 2023-05-10 RX ADMIN — TAMSULOSIN HYDROCHLORIDE 0.4 MG: 0.4 CAPSULE ORAL at 17:10

## 2023-05-10 RX ADMIN — DOCUSATE SODIUM 100 MG: 100 CAPSULE, LIQUID FILLED ORAL at 08:42

## 2023-05-10 RX ADMIN — HYDROMORPHONE HYDROCHLORIDE 4 MG: 4 TABLET ORAL at 12:25

## 2023-05-10 RX ADMIN — INSULIN LISPRO 4 UNITS: 100 INJECTION, SOLUTION INTRAVENOUS; SUBCUTANEOUS at 12:21

## 2023-05-10 RX ADMIN — TRIMETHOBENZAMIDE HYDROCHLORIDE 200 MG: 100 INJECTION INTRAMUSCULAR at 12:29

## 2023-05-10 RX ADMIN — ASPIRIN 81 MG: 81 TABLET, CHEWABLE ORAL at 08:42

## 2023-05-10 RX ADMIN — VERAPAMIL HYDROCHLORIDE 360 MG: 180 TABLET ORAL at 21:02

## 2023-05-10 RX ADMIN — ZOLPIDEM TARTRATE 10 MG: 5 TABLET ORAL at 21:02

## 2023-05-10 RX ADMIN — CLOPIDOGREL BISULFATE 75 MG: 75 TABLET ORAL at 08:43

## 2023-05-10 RX ADMIN — HYDROMORPHONE HYDROCHLORIDE 4 MG: 2 TABLET ORAL at 21:02

## 2023-05-10 RX ADMIN — PANTOPRAZOLE SODIUM 40 MG: 40 TABLET, DELAYED RELEASE ORAL at 05:03

## 2023-05-10 RX ADMIN — DOCUSATE SODIUM 100 MG: 100 CAPSULE, LIQUID FILLED ORAL at 17:10

## 2023-05-10 RX ADMIN — ATORVASTATIN CALCIUM 40 MG: 40 TABLET, FILM COATED ORAL at 08:43

## 2023-05-10 RX ADMIN — INSULIN LISPRO 4 UNITS: 100 INJECTION, SOLUTION INTRAVENOUS; SUBCUTANEOUS at 08:50

## 2023-05-10 RX ADMIN — INSULIN GLARGINE 20 UNITS: 100 INJECTION, SOLUTION SUBCUTANEOUS at 21:02

## 2023-05-10 RX ADMIN — ENOXAPARIN SODIUM 40 MG: 40 INJECTION SUBCUTANEOUS at 05:04

## 2023-05-10 RX ADMIN — INSULIN LISPRO 15 UNITS: 100 INJECTION, SOLUTION INTRAVENOUS; SUBCUTANEOUS at 08:50

## 2023-05-10 NOTE — ASSESSMENT & PLAN NOTE
"/71   Pulse 86   Temp 97 7 °F (36 5 °C) (Oral)   Resp 18   Ht 6' 4\" (1 93 m)   Wt 122 kg (268 lb 15 4 oz)   SpO2 93%   BMI 32 74 kg/m²   · Continue verapamil, hold losartan/HCTZ due to soft blood pressure  "

## 2023-05-10 NOTE — CASE MANAGEMENT
Support Center has received denial   Denial received for: Acute Rehab  Facility: [address] [place] [address]  Denial #:[id number]  Denial Reason: Appropriate for Sub Acute Not Acute  Peer to Peer [facility]#:[phone]       [facility] [address] in by [place] - [phone]  Patient Appeal [facility]#[phone]

## 2023-05-10 NOTE — ASSESSMENT & PLAN NOTE
Chronic, subpar control, as evidenced by a hemoglobin A1C of 7 9  Home regimen includes: 45 units Lantus at bedtime with Apidra 30 units TID with meals  Blood glucose improved,   Can resume home regimen upon discharge

## 2023-05-10 NOTE — PLAN OF CARE
Problem: PAIN - ADULT  Goal: Verbalizes/displays adequate comfort level or baseline comfort level  Description: Interventions:  - Encourage patient to monitor pain and request assistance  - Assess pain using appropriate pain scale  - Administer analgesics based on type and severity of pain and evaluate response  - Implement non-pharmacological measures as appropriate and evaluate response  - Consider cultural and social influences on pain and pain management  - Notify physician/advanced practitioner if interventions unsuccessful or patient reports new pain  Outcome: Progressing     Problem: INFECTION - ADULT  Goal: Absence or prevention of progression during hospitalization  Description: INTERVENTIONS:  - Assess and monitor for signs and symptoms of infection  - Monitor lab/diagnostic results  - Monitor all insertion sites, i e  indwelling lines, tubes, and drains  - Monitor endotracheal if appropriate and nasal secretions for changes in amount and color  - [place] appropriate cooling/warming therapies per order  - Administer medications as ordered  - Instruct and encourage patient and family to use good hand hygiene technique  - Identify and instruct in appropriate isolation precautions for identified infection/condition  Outcome: Progressing  Goal: Absence of fever/infection during neutropenic period  Description: INTERVENTIONS:  - Monitor WBC    Outcome: Progressing     Problem: SAFETY ADULT  Goal: Patient will remain free of falls  Description: INTERVENTIONS:  - Educate patient/family on patient safety including physical limitations  - Instruct patient to call for assistance with activity   - Consult OT/PT to assist with strengthening/mobility   - Keep Call bell within reach  - Keep bed low and locked with side rails adjusted as appropriate  - Keep care items and personal belongings within reach  - Initiate and maintain comfort rounds  - Make Fall Risk Sign visible to staff  - Offer Toileting every hours, in advance of need  - Initiate/Maintain alarm  - Obtain necessary fall risk management equipment:   - Apply yellow socks and bracelet for high fall risk patients  - Consider moving patient to room near nurses station  Outcome: Progressing  Goal: Maintain or return to baseline ADL function  Description: INTERVENTIONS:  -  Assess patient's ability to carry out ADLs; assess patient's baseline for ADL function and identify physical deficits which impact ability to perform ADLs (bathing, care of mouth/teeth, toileting, grooming, dressing, etc )  - Assess/evaluate cause of self-care deficits   - Assess range of motion  - Assess patient's mobility; develop plan if impaired  - Assess patient's need for assistive devices and provide as appropriate  - Encourage maximum independence but intervene and supervise when necessary  - Involve family in performance of ADLs  - Assess for home care needs following discharge   - Consider OT consult to assist with ADL evaluation and planning for discharge  - Provide patient education as appropriate  Outcome: Progressing  Goal: Maintains/Returns to pre admission functional level  Description: INTERVENTIONS:  - Perform BMAT or MOVE assessment daily    - Set and communicate daily mobility goal to care team and patient/family/caregiver  - Collaborate with rehabilitation services on mobility goals if consulted  - Perform Range of Motion  times a day  - Reposition patient every  hours    - Dangle patient  times a day  - Stand patient  times a day  - Ambulate patient  times a day  - Out of bed to chair  times a day   - Out of bed for meals  times a day  - Out of bed for toileting  - Record patient progress and toleration of activity level   Outcome: Progressing     Problem: DISCHARGE PLANNING  Goal: Discharge to home or other facility with appropriate resources  Description: INTERVENTIONS:  - Identify barriers to discharge w/patient and caregiver  - Arrange for needed discharge resources and transportation as appropriate  - Identify discharge learning needs (meds, wound care, etc )  - Arrange for interpretive services to assist at discharge as needed  - Refer to Case Management Department for coordinating discharge planning if the patient needs post-hospital services based on physician/advanced practitioner order or complex needs related to functional status, cognitive ability, or social support system  Outcome: Progressing     Problem: Knowledge Deficit  Goal: Patient/family/caregiver demonstrates understanding of disease process, treatment plan, medications, and discharge instructions  Description: Complete learning assessment and assess knowledge base    Interventions:  - Provide teaching at level of understanding  - Provide teaching via preferred learning methods  Outcome: Progressing     Problem: SKIN/TISSUE INTEGRITY - ADULT  Goal: Skin Integrity remains intact(Skin Breakdown Prevention)  Description: Assess:  -Perform Fabrice assessment every   -Clean and moisturize skin every   -Inspect skin when repositioning, toileting, and assisting with ADLS  -Assess under medical devices such as  every   -Assess extremities for adequate circulation and sensation     Bed Management:  -Have minimal linens on bed & keep smooth, unwrinkled  -Change linens as needed when moist or perspiring  -Avoid sitting or lying in one position for more than  hours while in bed  -Keep HOB at degrees     Toileting:  -Offer bedside commode  -Assess for incontinence every   -Use incontinent care products after each incontinent episode such as     Activity:  -Mobilize patient  times a day  -Encourage activity and walks on unit  -Encourage or provide ROM exercises   -Turn and reposition patient every  Hours  -Use appropriate equipment to lift or move patient in bed  -Instruct/ Assist with weight shifting every  when out of bed in chair  -Consider limitation of chair time  hour intervals    Skin Care:  -Avoid use of baby powder, tape, friction and shearing, hot water or constrictive clothing  -Relieve pressure over bony prominences using   -Do not massage red bony areas    Next Steps:  -Teach patient strategies to minimize risks such as    -Consider consults to  interdisciplinary teams such as   Outcome: Progressing  Goal: Incision(s), wounds(s) or drain site(s) healing without S/S of infection  Description: INTERVENTIONS  - Assess and document dressing, incision, wound bed, drain sites and surrounding tissue  - Provide patient and family education  - Perform skin care/dressing changes every   Outcome: Progressing  Goal: Pressure injury heals and does not worsen  Description: Interventions:  - Implement low air loss mattress or specialty surface (Criteria met)  - Apply silicone foam dressing  - Instruct/assist with weight shifting every  minutes when in chair   - Limit chair time to  hour intervals  - Use special pressure reducing interventions such as  when in chair   - Apply fecal or urinary incontinence containment device   - Perform passive or active ROM every   - Turn and reposition patient & offload bony prominences every  hours   - Utilize friction reducing device or surface for transfers   - Consider consults to  interdisciplinary teams such as   - Use incontinent care products after each incontinent episode such as   - Consider nutrition services referral as needed  Outcome: Progressing     Problem: Prexisting or High Potential for Compromised Skin Integrity  Goal: Skin integrity is maintained or improved  Description: INTERVENTIONS:  - Identify patients at risk for skin breakdown  - Assess and monitor skin integrity  - Assess and monitor nutrition and hydration status  - Monitor labs   - Assess for incontinence   - Turn and reposition patient  - Assist with mobility/ambulation  - Relieve pressure over bony prominences  - Avoid friction and shearing  - Provide appropriate hygiene as needed including keeping skin clean and dry  - Evaluate need for skin moisturizer/barrier cream  - Collaborate with interdisciplinary team   - Patient/family teaching  - Consider wound care consult   Outcome: Progressing     Problem: MOBILITY - ADULT  Goal: Maintain or return to baseline ADL function  Description: INTERVENTIONS:  -  Assess patient's ability to carry out ADLs; assess patient's baseline for ADL function and identify physical deficits which impact ability to perform ADLs (bathing, care of mouth/teeth, toileting, grooming, dressing, etc )  - Assess/evaluate cause of self-care deficits   - Assess range of motion  - Assess patient's mobility; develop plan if impaired  - Assess patient's need for assistive devices and provide as appropriate  - Encourage maximum independence but intervene and supervise when necessary  - Involve family in performance of ADLs  - Assess for home care needs following discharge   - Consider OT consult to assist with ADL evaluation and planning for discharge  - Provide patient education as appropriate  Outcome: Progressing  Goal: Maintains/Returns to pre admission functional level  Description: INTERVENTIONS:  - Perform BMAT or MOVE assessment daily    - Set and communicate daily mobility goal to care team and patient/family/caregiver  - Collaborate with rehabilitation services on mobility goals if consulted  - Perform Range of Motion  times a day  - Reposition patient every  hours    - Dangle patient  times a day  - Stand patient  times a day  - Ambulate patient  times a day  - Out of bed to chair imes a day   - Out of bed for meals times a day  - Out of bed for toileting  - Record patient progress and toleration of activity level   Outcome: Progressing

## 2023-05-10 NOTE — CASE MANAGEMENT
Case Management Discharge Planning Note    Patient name Tanja Sandifer  Location [address] 212/-01 MRN [id number]  : 1965 Date 5/10/2023       Current Admission Date: 2023  Current Admission Diagnosis:Closed fracture of medial plateau of right tibia   Patient Active Problem List    Diagnosis Date Noted   • MANNY (obstructive sleep apnea) 2023   • Closed fracture of medial plateau of right tibia 2023   • Depression and anxiety 2023   • Acquired genu varum of right lower extremity 2023   • MARKELL (acute kidney injury) ([facility] ) 2023   • Chronic kidney disease-mineral and bone disorder 2023   • Closed fracture of right tibial plateau    • Stage 3b chronic kidney disease ([facility] ) 11/15/2022   • Hyperkalemia 11/15/2022   • Alcoholism ([facility] ) 11/15/2022   • Dupuytren's contracture of right hand 2022   • Type 2 diabetes mellitus with diabetic peripheral angiopathy without gangrene ([facility] ) 2022   • Physical deconditioning 2021   • Acquired hypothyroidism 10/18/2021   • Acquired absence of right foot ([facility] ) 2021   • Persistent proteinuria 2021   • Abnormal EKG 2020   • Preoperative examination 2020   • Insomnia 2020   • Acute blood loss anemia 10/08/2020   • Hypomagnesemia 2020   • Urinary retention 2020   • Thyroid cancer ([facility] ) 2020   • PAD (peripheral artery disease) ([facility] ) 2020   • Hyponatremia 2020   • Health maintenance examination 2020   • Elevated liver enzymes 10/24/2019   • Colon polyps 2019   • Right-sided tinnitus 2018   • Hypertension, essential 2018   • Gastroesophageal reflux disease without esophagitis 2018   • Obesity, Class I, BMI 30 0-34 9 (see actual BMI) 2010   • Hyperlipidemia 2009   • History of nonadherence to medical treatment 2008      LOS (days): 9  Geometric Mean LOS (GMLOS) (days): 4 00  Days to GMLOS:-4 7     OBJECTIVE:  Risk of Unplanned Readmission Score: 26 99      Current admission status: Inpatient   Preferred Pharmacy:   [facility] #[id number] Silvana Rajeshtommie, [address] [place] [address]  Phone: [phone] Fax: [phone] [address] [facility] [place] [address]  Phone: [phone] Fax: [phone]    Primary Care Provider: Kendal Ricardo MD    Primary Insurance: BLUE CROSS  Secondary Insurance:     DISCHARGE DETAILS:  CM received TT message from CM D/C Support Team noting insurance denial for [facility] placement  CM sent email to Physician Advisor Liaisons for P2P  CM will continue to follow, including securing STR/SNF placement for backup

## 2023-05-10 NOTE — PROGRESS NOTES
-- Patient: Yaquelin Richardson  -- MRN: [id number]  -- Aidin Request ID: [id number]  -- Level of care reserved: Inpatient [facility]  -- Partner Reserved: [facility] - ([place]), [facility] [phone]  -- Clinical needs requested:  -- Geography [place] around [place] [address]  -- Start of Service:  -- Request sent: 9:26am EDT on 5/5/2023 by Anastacio Little  -- Partner reserved: 4:27pm EDT on 5/5/2023 by Anastacio Little  -- Choice list shared:

## 2023-05-10 NOTE — PROGRESS NOTES
Progress Note - Orthopedics   Yovani Mosquera 59 y o  male MRN: [id number]  Unit/Bed#: -01      Subjective:    59 y o male POD#9 s/p ORIF Right tibial plateau nonunion  Patient is laying in hospital bed this afternoon in no acute distress  Patient reports he just received pain medication, so pain is currently controlled  Patient states pain is 8/10  Patient denies any numbness or tingling in right lower extremity  Patient has maintained nonweightbearing status with knee immobilizer  Patient has been complaint with Lovenox injections  Patient offers no additional complaints      Labs:  0   Lab Value Date/Time    HCT 24 7 (L) 05/09/2023 0522    HCT 25 7 (L) 05/07/2023 0537    HCT 23 9 (L) 05/05/2023 1448    HGB 8 0 (L) 05/09/2023 0522    HGB 8 3 (L) 05/07/2023 0537    HGB 7 9 (L) 05/05/2023 1448    INR 1 04 08/04/2020 1358    WBC 10 06 05/09/2023 0522    WBC 10 77 (H) 05/07/2023 0537    WBC 13 73 (H) 05/05/2023 1448    ESR 24 (H) 02/21/2020 1101       Meds:    Current Facility-Administered Medications:   •  acetaminophen (TYLENOL) tablet 650 mg, 650 mg, Oral, Q8H PRN, Jossie Hernandez PA-C, 650 mg at 05/09/23 1551  •  aspirin chewable tablet 81 mg, 81 mg, Oral, Daily, Aaron Wright PA-C, 81 mg at 05/10/23 3635  •  atorvastatin (LIPITOR) tablet 40 mg, 40 mg, Oral, QAM, Aaron Wright PA-C, 40 mg at 05/10/23 6912  •  clopidogrel (PLAVIX) tablet 75 mg, 75 mg, Oral, QAM, Aaron Wright PA-C, 75 mg at 05/10/23 8270  •  docusate sodium (COLACE) capsule 100 mg, 100 mg, Oral, BID, Aaron Wright PA-C, 100 mg at 05/10/23 8605  •  enoxaparin (LOVENOX) subcutaneous injection 40 mg, 40 mg, Subcutaneous, Daily, Jossie Hernandez PA-C, 40 mg at 05/10/23 6392  •  escitalopram (LEXAPRO) tablet 10 mg, 10 mg, Oral, Daily, Aaron Wright PA-C, 10 mg at 05/07/23 6329  •  HYDROmorphone (DILAUDID) tablet 2 mg, 2 mg, Oral, Q6H PRN, Ruth Ann Hargrove PA-C  •  HYDROmorphone (DILAUDID) tablet 4 mg, 4 mg, Oral, Q6H PRN, Hemalatha Carlisle PA-C, 4 mg at 05/10/23 1225  •  HYDROmorphone HCl (DILAUDID) injection 0 2 mg, 0 2 mg, Intravenous, Q3H PRN, Ruth Ann Hargrove PA-C, 0 2 mg at 05/02/23 0405  •  insulin glargine (LANTUS) subcutaneous injection 20 Units 0 2 mL, 20 Units, Subcutaneous, HS, Polo Plata MD, 20 Units at 05/09/23 2222  •  insulin lispro (HumaLOG) 100 units/mL subcutaneous injection 15 Units, 15 Units, Subcutaneous, TID With Meals, Polo Plata MD, 15 Units at 05/10/23 0850  •  insulin lispro (HumaLOG) 100 units/mL subcutaneous injection 2-12 Units, 2-12 Units, Subcutaneous, 4x Daily (AC & HS), 4 Units at 05/10/23 1221 **AND** Fingerstick Glucose (POCT), , , 4x Daily AC and at bedtime, Dipti Herrera MD  •  levothyroxine tablet 150 mcg, 150 mcg, Oral, QAM, Aaron Wright PA-C, 150 mcg at 05/10/23 7380  •  magnesium hydroxide (MILK OF MAGNESIA) oral suspension 30 mL, 30 mL, Oral, Daily PRN, Jericho Mooney MD, 30 mL at 05/08/23 1414  •  pantoprazole (PROTONIX) EC tablet 40 mg, 40 mg, Oral, Early Morning, Aaron Wright PA-C, 40 mg at 05/10/23 0503  •  senna-docusate sodium (SENOKOT S) 8 6-50 mg per tablet 1 tablet, 1 tablet, Oral, Daily, Nisreen Parker PA-C, 1 tablet at 05/10/23 8559  •  tamsulosin (FLOMAX) capsule 0 4 mg, 0 4 mg, Oral, Daily With Dinner, Laron Felix PA-C, 0 4 mg at 05/09/23 8126  •  trimethobenzamide (TIGAN) IM injection 200 mg, 200 mg, Intramuscular, Q6H PRN, NAILA Andrade, 200 mg at 05/10/23 1229  •  verapamil (CALAN-SR) CR tablet 360 mg, 360 mg, Oral, HS, Aaron Wright PA-C, 360 mg at 05/08/23 2152  •  zolpidem (AMBIEN) tablet 10 mg, 10 mg, Oral, HS PRN, Laron Felix PA-C, 10 mg at 05/09/23 2221    Blood Culture:   Lab Results   Component Value Date    BLOODCX No Growth After 5 Days  10/10/2020    BLOODCX No Growth After 5 Days   10/10/2020       Wound Culture:   Lab Results   Component Value Date    WOUNDCULT 2+ Growth of Morganella morganii (A) 08/30/2020    WOUNDCULT (A) 08/30/2020     2+ Growth of Methicillin Resistant Staphylococcus aureus    WOUNDCULT 1+ Growth of Enterobacter cloacae complex (A) 08/30/2020    WOUNDCULT 1+ Growth of Enterococcus faecalis (A) 08/30/2020       Ins and Outs:  I/O last 24 hours: In: 480 [P O :480]  Out: 825 [Urine:825]          Physical:  Vitals:    05/10/23 1224   BP: 109/71   Pulse:    Resp:    Temp:    SpO2:      Musculoskeletal:  Right lower extremity  • Patient is a 59-year-old male laying comfortably in hospital bed in no acute distress  • Knee immobilizer intact upon evaluation  • Dressings are dry and intact  Drainage present on distal aspect of dressing  Dressings removed and incisions appears intact without active drainage  See clinical picture below  New dressings placed over incisions  • TTP throughout Right lower extremity  • Sensation intact to saphenous, sural, tibial, superficial peroneal nerve, and deep peroneal  • Motor intact to ankle dorsi/plantar flexion  • 2+ DP pulse, symmetric bilaterally  • Extremity warm and well perfused  • Calf supple and nontender            Assessment:    58 y o male POD#9 s/p ORIF Right tibial plateau nonunion    Plan:  • Nonweightbearing Right lower extremity with knee immobilizer intact at all times   • Will monitor for ABLA and administer IVF/prbc as indicated for Greater than 2 gram drop or Hgb < 7  Hgb of 8 0 yesterday  • PT/OT- up and out of bed, gait training while maintaining NWB status Right lower extremity  • Pain control  • Patient may shower at this time and change dressings as needed  • DVT ppx- Lovenox 40mg once daily + Plavix   • Dispo:  Discharge planning today to [facility]  Follow-up with Dr Rowan Morejon next week outpatient  See above for additional details       Britney Rodriguez PA-C

## 2023-05-10 NOTE — CASE MANAGEMENT
Case Management Discharge Planning Note    Patient name Lidia Felix  Location [address] 212/-01 MRN [id number]  : 1965 Date 5/10/2023       Current Admission Date: 2023  Current Admission Diagnosis:Closed fracture of medial plateau of right tibia   Patient Active Problem List    Diagnosis Date Noted   • MANNY (obstructive sleep apnea) 2023   • Closed fracture of medial plateau of right tibia 2023   • Depression and anxiety 2023   • Acquired genu varum of right lower extremity 2023   • MARKELL (acute kidney injury) ([facility] ) 2023   • Chronic kidney disease-mineral and bone disorder 2023   • Closed fracture of right tibial plateau    • Stage 3b chronic kidney disease ([facility] ) 11/15/2022   • Hyperkalemia 11/15/2022   • [facility] 11/15/2022   • Dupuytren's contracture of right hand 2022   • Type 2 diabetes mellitus with diabetic peripheral angiopathy without gangrene ([facility] ) 2022   • Physical deconditioning 2021   • Acquired hypothyroidism 10/18/2021   • Acquired absence of right foot ([facility] ) 2021   • Persistent proteinuria 2021   • Abnormal EKG 2020   • Preoperative examination 2020   • Insomnia 2020   • Acute blood loss anemia 10/08/2020   • Hypomagnesemia 2020   • Urinary retention 2020   • Thyroid cancer ([facility] ) 2020   • PAD (peripheral artery disease) ([facility] ) 2020   • Hyponatremia 2020   • Health maintenance examination 2020   • Elevated liver enzymes 10/24/2019   • Colon polyps 2019   • Right-sided tinnitus 2018   • Hypertension, essential 2018   • Gastroesophageal reflux disease without esophagitis 2018   • Obesity, Class I, BMI 30 0-34 9 (see actual BMI) 2010   • Hyperlipidemia 2009   • History of nonadherence to medical treatment 2008      LOS (days): 9  Geometric Mean LOS (GMLOS) (days): 4 00  Days to GMLOS:-4 8     OBJECTIVE:  Risk of Unplanned Readmission Score: 26 99      Current admission status: Inpatient   Preferred Pharmacy:   [address] [place] [address] #[id number] Laina Almanzar, [address] [address]  Phone: [phone] Fax: [phone]    [address], [facility] [address]  Phone: [phone] Fax: [phone]    Primary Care Provider: Jose Juan Ramos MD    Primary Insurance: BLUE CROSS  Secondary Insurance:     DISCHARGE DETAILS:    Discharge planning discussed with[de-identified] Patient at bedside  Freedom of Choice: Yes  Comments - Freedom of Choice: CM reviewed ARC denial and P2P process  CM provided patient with list of accepting STR placements as backup  Patient states he will review with his wife when she arrives  States he went to [facility] time and had an awful experience  Preference is [facility], CM reviewed unavailable status in [address] [place] [address] Road  CM contacted family/caregiver?: No- see comments (declined - wife coming to bedside )  Were Treatment Team discharge recommendations reviewed with patient/caregiver?: Yes (As it pertains to d/c planning and CM role )  Did patient/caregiver verbalize understanding of patient care needs?: N/A- going to facility  Were patient/caregiver advised of the risks associated with not following Treatment Team discharge recommendations?: Yes (As it pertains to d/c planning and CM role )    Requested [address]         Is the [place] interested [place] [address] at discharge?: No    DME Referral Provided  Referral made for DME?: No    Other Referral/Resources/Interventions Provided:  Interventions: Acute Rehab, Short Term Rehab  Referral Comments: ARC denial by insurance  PAL team initiating P2P  STR referral open - patient reviewing      Would you like to participate in our [address] service program?  : No - Declined    Treatment Team Recommendation: Acute Rehab, Short Term Rehab  Discharge Destination Plan[de-identified] Acute Rehab, Short Term Rehab     Dispatcher Contacted: No     IMM Given (Date):: 05/10/23  IMM Given to[de-identified] Patient     Additional Comments: IMM and Medicare rights reviewed as they pertain to d/c planning  Patient reports understanding  In agreement with d/c to rehab  Patient copy provided  Original to medical records bin for filing

## 2023-05-10 NOTE — DISCHARGE SUMMARY
Discharge Summary - Orthopedics   Marie Jimenez 59 y o  male MRN: [id number]  Unit/Bed#: -01    Attending Physician:Dr Urbano Chowdhury    Admitting diagnosis: Right medial tibial plateau fracture, closed, initial encounter [S82 131A]    Discharge diagnosis: Right medial tibial plateau fracture, closed, initial encounter [S82 131A]    Date of admission: 5/1/2023    Date of discharge: 05/10/23    Procedure: Right tibia plateau bicondylar fracture ORIF    HPI: 59 y o  male with a history of tibial plateau fracture sustained on 1/27/2023 who has been seen by Dr Urbano Chowdhury in clinic  Pt has failed previous non operative therapies and was scheduled for ORIF of the right tibial plateau  Prior to surgery the risks and benefits of surgery were explained and informed consent was obtained  Hospital course: Pt was taken to the OR on 5/01/2023  Surgery went without complications and pt was discharged to the PACU in a stable condition and was transferred to the floor  Pain was well managed with oral and IV analgesics  Lovenox was started postoperatively for DVT prophylaxis  SLIM was consulted postoperatively for medical management  Patient began PT/OT on POD#1  Urology was consulted on POD#1 for urinary retention and recommendations were appreciated  On POD#2 patient had hyponatremia and nephrology was consulted  Patient was medically cleared for discharge on POD#4  Patient continued to be seen by PT/OT and recommendations were made for rehab facility  On discharge date pt was cleared by PT and the medicine team and determined to be safe for discharge to rehab  Daily discussion was had with the patient, nursing staff, orthopaedic team, and family members if present  All questions were answered to the patients satisifaction        0   Lab Value Date/Time    HGB 8 0 (L) 05/09/2023 0522    HGB 8 3 (L) 05/07/2023 0537    HGB 7 9 (L) 05/05/2023 1448    HGB 8 0 (L) 05/05/2023 0922    HGB 8 1 (L) 05/04/2023 1807    HGB 8 0 (L) 05/04/2023 0813    HGB 8 0 (L) 05/03/2023 2038    HGB 8 1 (L) 05/03/2023 0459    HGB 9 1 (L) 05/02/2023 0502    HGB 10 2 (L) 05/01/2023 1703    HGB 11 4 (L) 04/24/2023 1206    HGB 10 3 (L) 03/07/2023 0542    HGB 10 5 (L) 03/06/2023 0451    HGB 10 2 (L) 03/05/2023 0442    HGB 10 7 (L) 03/04/2023 0555    HGB 11 4 (L) 03/03/2023 1243    HGB 12 7 01/31/2023 1536    HGB 15 2 11/14/2022 1428    HGB 13 7 12/10/2020 1109    HGB 11 5 (L) 10/10/2020 1011    HGB 10 9 (L) 09/27/2020 1316    HGB 10 1 (L) 09/23/2020 1138    HGB 9 8 (L) 09/19/2020 0609    HGB 9 8 (L) 09/18/2020 0604    HGB 9 7 (L) 09/17/2020 0557    HGB 9 6 (L) 09/16/2020 0512    HGB 9 5 (L) 09/15/2020 0630    HGB 9 2 (L) 09/14/2020 0437    HGB 9 6 (L) 09/13/2020 0642    HGB 9 6 (L) 09/12/2020 0759    HGB 9 3 (L) 09/11/2020 0448    HGB 9 4 (L) 09/10/2020 0522    HGB 9 5 (L) 09/09/2020 0435    HGB 10 1 (L) 09/08/2020 0648    HGB 10 8 (L) 09/05/2020 0611    HGB 11 1 (L) 09/01/2020 0657    HGB 12 5 08/31/2020 0615    HGB 10 8 (L) 08/30/2020 0407    HGB 11 2 (L) 08/29/2020 0519    HGB 12 3 08/28/2020 1558    HGB 11 5 (L) 08/12/2020 1039    HGB 12 1 08/11/2020 0543    HGB 12 1 08/08/2020 0538    HGB 11 2 (L) 08/07/2020 0624    HGB 11 7 (L) 08/06/2020 1155    HGB 11 6 (L) 08/05/2020 0506    HGB 14 1 08/04/2020 1358    HGB 14 3 09/27/2019 1212    HGB 16 6 11/15/2018 0942     Greater than 2 gram decrease in Hb qualifies for diagnosis of acute blood loss anemia  Vital signs remained stable and pt was resuscitated with IVF as needed  Discharge Instructions:   · Nonweightbearing right lower extremity with knee immobilizer intact at all times  · May change dressings as needed  · May shower at this time and allow warm soapy water to run over incision  Do not scrub incision  · Complete DVT prophylaxis as prescribed   · Physical therapy  · Body mass index is 32 74 kg/m²  mildly obese  Recommend behavior modifications    · Follow-up as scheduled with Dr Norm Fortune on 5/16, otherwise call for appt  Discharge Medications: For the complete list of discharge medications, please refer to the patient's medication reconciliation

## 2023-05-10 NOTE — PROGRESS NOTES
"[address]  Progress Note  Name: Tutu Olivares  MRN: [id number]  Unit/Bed#: -01 I Date of Admission: 5/1/2023   Date of Service: 5/10/2023 I Hospital Day: 9    Assessment/Plan   * Closed fracture of medial plateau of right tibia  Assessment & Plan  · S/p reconstruction of proximal tibia today however may require cement spacer as well as possible external fixator placement  · Pain management per primary team  Ortho primary  · Pain control per primary  · DVT prophylaxis Lovenox per primary    MANNY (obstructive sleep apnea)  Assessment & Plan  · Continue CPAP, patient reported he is not compliant  · Importance of compliance with regimen  Type 2 diabetes mellitus with diabetic peripheral angiopathy without gangrene (HCC)  Assessment & Plan  Chronic, subpar control, as evidenced by a hemoglobin A1C of 7 9  Home regimen includes: 45 units Lantus at bedtime with Apidra 30 units TID with meals  Blood glucose improved,   Can resume home regimen upon discharge     Acute blood loss anemia  Assessment & Plan  · Patient with a hemoglobin 11 4 with acute drop to 8 after surgery  · Hemoglobin remains stable, above 8  · Monitoring per primary out patient     PAD (peripheral artery disease) ([facility] [address] )  Assessment & Plan  · On aspirin, Plavix    Hyperlipidemia  Assessment & Plan  · Continue home Lipitor    Hypertension, essential  Assessment & Plan  /71   Pulse 86   Temp 97 7 °F (36 5 °C) (Oral)   Resp 18   Ht 6' 4\" (1 93 m)   Wt 122 kg (268 lb 15 4 oz)   SpO2 93%   BMI 32 74 kg/m²   · Continue verapamil, hold losartan/HCTZ due to soft blood pressure               VTE Pharmacologic Prophylaxis:   lovonox    Patient Centered Rounds: I performed bedside rounds with nursing staff today  Discussions with Specialists or Other Care Team Provider: ortho note reviewed    Education and Discussions with Family / Patient: Updated  (wife) at bedside      Total Time Spent on Date of " Encounter in care of patient: 25 minutes This time was spent on one or more of the following: performing physical exam; counseling and coordination of care; obtaining or reviewing history; documenting in the medical record; reviewing/ordering tests, medications or procedures; communicating with other healthcare professionals and discussing with patient's family/caregivers  Current Length of Stay: 9 day(s)  Current Patient Status: Inpatient   Certification Statement: medically stable pending safe dispo plan per case management in patient fx tib s/p OR intervention by ortho  Discharge Plan: Lai Giles is following this patient on consult  They are medically stable for discharge when deemed appropriate by primary service  Code Status: Level 1 - Full Code    Subjective:   Patient sitting up in bed with wife at bedside update provided on appeal going through and patient accepted at [facility]  Patient and wife would like up date from      Objective:     Vitals:   Temp (24hrs), Av 8 °F (37 1 °C), Min:97 7 °F (36 5 °C), Max:99 9 °F (37 7 °C)    Temp:  [97 7 °F (36 5 °C)-99 9 °F (37 7 °C)] 97 7 °F (36 5 °C)  HR:  [86] 86  Resp:  [18] 18  BP: (100-125)/(61-79) 109/71  SpO2:  [93 %] 93 %  Body mass index is 32 74 kg/m²  Input and Output Summary (last 24 hours): Intake/Output Summary (Last 24 hours) at 5/10/2023 1343  Last data filed at 5/10/2023 1229  Gross per 24 hour   Intake 480 ml   Output 825 ml   Net -345 ml       Physical Exam:   Physical Exam  Vitals and nursing note reviewed  Constitutional:       General: He is not in acute distress  Appearance: He is well-developed  HENT:      Head: Normocephalic and atraumatic  Cardiovascular:      Rate and Rhythm: Normal rate  Heart sounds: No murmur heard  Pulmonary:      Effort: Pulmonary effort is normal  No respiratory distress  Abdominal:      Palpations: Abdomen is soft  Tenderness: There is no abdominal tenderness     Musculoskeletal: General: No swelling  Cervical back: Neck supple  Comments: Brace intact   Neurological:      Mental Status: He is alert and oriented to person, place, and time  Psychiatric:         Mood and Affect: Mood normal           Additional Data:     Labs:  Results from last 7 days   Lab Units 05/09/23  0522 05/07/23  0537   WBC Thousand/uL 10 06 10 77*   HEMOGLOBIN g/dL 8 0* 8 3*   HEMATOCRIT % 24 7* 25 7*   PLATELETS Thousands/uL 391* 346   NEUTROS PCT %  --  66   LYMPHS PCT %  --  16   MONOS PCT %  --  14*   EOS PCT %  --  2     Results from last 7 days   Lab Units 05/09/23  0522 05/06/23  0518 05/05/23  0922   SODIUM mmol/L 133*   < > 132*   POTASSIUM mmol/L 3 6   < > 3 6   CHLORIDE mmol/L 101   < > 100   CO2 mmol/L 26   < > 25   BUN mg/dL 13   < > 19   CREATININE mg/dL 0 64   < > 0 75   ANION GAP mmol/L 6   < > 7   CALCIUM mg/dL 8 3*   < > 8 6   ALBUMIN g/dL  --   --  3 0*   TOTAL BILIRUBIN mg/dL  --   --  0 76   ALK PHOS U/L  --   --  95   ALT U/L  --   --  5*   AST U/L  --   --  9*   GLUCOSE RANDOM mg/dL 144*   < > 210*    < > = values in this interval not displayed  Results from last 7 days   Lab Units 05/10/23  1120 05/10/23  0833 05/09/23  2026 05/09/23  1901 05/09/23  1606 05/09/23  1133 05/09/23  0800 05/08/23  2155 05/08/23  1643 05/08/23  1117 05/08/23  0824 05/07/23  2055   POC GLUCOSE mg/dl 218* 204* 161* 125 108 213* 164* 159* 59* 178* 194* 91               Lines/Drains:  Invasive Devices     Peripheral Intravenous Line  Duration           Peripheral IV 05/06/23 Left;Ventral (anterior) Forearm 4 days                      Imaging: No pertinent imaging reviewed      Recent Cultures (last 7 days):         Last 24 Hours Medication List:   Current Facility-Administered Medications   Medication Dose Route Frequency Provider Last Rate   • acetaminophen  650 mg Oral Q8H PRN Manav Pereira PA-C     • aspirin  81 mg Oral Daily Manav Pereira PA-C     • atorvastatin  40 mg Oral QAM Yonas Lynch PA-C     • clopidogrel  75 mg Oral QAM [facility] GABRIEL Lynch     • docusate sodium  100 mg Oral BID [facility]     • enoxaparin  40 mg Subcutaneous Daily [facility]     • escitalopram  10 mg Oral Daily Rodrigoal GABRIEL Lynch     • HYDROmorphone  2 mg Oral Q6H PRN Derrick Salas PA-C     • HYDROmorphone  4 mg Oral Q6H PRN Derrick Salas PA-C     • HYDROmorphone  0 2 mg Intravenous Q3H PRN Derrick Salas PA-C     • insulin glargine  20 Units Subcutaneous HS Deangelo Curiel MD     • insulin lispro  15 Units Subcutaneous TID With Meals Deangelo Curiel MD     • insulin lispro  2-12 Units Subcutaneous 4x Daily (AC & HS) Beverly Sanford MD     • levothyroxine  150 mcg Oral QAM Aaron Wright PA-C     • magnesium hydroxide  30 mL Oral Daily PRN Jamie Hubbard MD     • pantoprazole  40 mg Oral Early Morning Yonas Lynch PA-C     • senna-docusate sodium  1 tablet Oral Daily Nisreen Villafana PA-C     • tamsulosin  0 4 mg Oral Daily With Je Wright PA-C     • trimethobenzamide  200 mg Intramuscular Q6H PRN NAILA Rocha     • verapamil  360 mg Oral HS [facility]     • zolpidem  10 mg Oral HS PRN Yonas Lynch PA-C          Today, Patient Was Seen By: NAILA You    **Please Note: This note may have been constructed using a voice recognition system  **

## 2023-05-10 NOTE — ASSESSMENT & PLAN NOTE
· Patient with a hemoglobin 11 4 with acute drop to 8 after surgery  · Hemoglobin remains stable, above 8  · Monitoring per primary out patient

## 2023-05-10 NOTE — PLAN OF CARE
Problem: PAIN - ADULT  Goal: Verbalizes/displays adequate comfort level or baseline comfort level  Description: Interventions:  - Encourage patient to monitor pain and request assistance  - Assess pain using appropriate pain scale  - Administer analgesics based on type and severity of pain and evaluate response  - Implement non-pharmacological measures as appropriate and evaluate response  - Consider cultural and social influences on pain and pain management  - Notify physician/advanced practitioner if interventions unsuccessful or patient reports new pain  Outcome: Progressing     Problem: INFECTION - ADULT  Goal: Absence or prevention of progression during hospitalization  Description: INTERVENTIONS:  - Assess and monitor for signs and symptoms of infection  - Monitor lab/diagnostic results  - Monitor all insertion sites, i e  indwelling lines, tubes, and drains  - Monitor endotracheal if appropriate and nasal secretions for changes in amount and color  - [place] appropriate cooling/warming therapies per order  - Administer medications as ordered  - Instruct and encourage patient and family to use good hand hygiene technique  - Identify and instruct in appropriate isolation precautions for identified infection/condition  Outcome: Progressing  Goal: Absence of fever/infection during neutropenic period  Description: INTERVENTIONS:  - Monitor WBC    Outcome: Progressing     Problem: SAFETY ADULT  Goal: Patient will remain free of falls  Description: INTERVENTIONS:  - Educate patient/family on patient safety including physical limitations  - Instruct patient to call for assistance with activity   - Consult OT/PT to assist with strengthening/mobility   - Keep Call bell within reach  - Keep bed low and locked with side rails adjusted as appropriate  - Keep care items and personal belongings within reach  - Initiate and maintain comfort rounds  - Make Fall Risk Sign visible to staff  - Offer Toileting every 2 Hours, in advance of need  - Initiate/Maintain alarm  - Obtain necessary fall risk management equipment:   - Apply yellow socks and bracelet for high fall risk patients  - Consider moving patient to room near nurses station  Outcome: Progressing  Goal: Maintain or return to baseline ADL function  Description: INTERVENTIONS:  -  Assess patient's ability to carry out ADLs; assess patient's baseline for ADL function and identify physical deficits which impact ability to perform ADLs (bathing, care of mouth/teeth, toileting, grooming, dressing, etc )  - Assess/evaluate cause of self-care deficits   - Assess range of motion  - Assess patient's mobility; develop plan if impaired  - Assess patient's need for assistive devices and provide as appropriate  - Encourage maximum independence but intervene and supervise when necessary  - Involve family in performance of ADLs  - Assess for home care needs following discharge   - Consider OT consult to assist with ADL evaluation and planning for discharge  - Provide patient education as appropriate  Outcome: Progressing  Goal: Maintains/Returns to pre admission functional level  Description: INTERVENTIONS:  - Perform BMAT or MOVE assessment daily    - Set and communicate daily mobility goal to care team and patient/family/caregiver  - Collaborate with rehabilitation services on mobility goals if consulted  - Perform Range of Motion 2 times a day  - Reposition patient every 2 hours    - Dangle patient 2 times a day  - Stand patient 2 times a day  - Ambulate patient 2 times a day  - Out of bed to chair 2 times a day   - Out of bed for meals 2 times a day  - Out of bed for toileting  - Record patient progress and toleration of activity level   Outcome: Progressing     Problem: DISCHARGE PLANNING  Goal: Discharge to home or other facility with appropriate resources  Description: INTERVENTIONS:  - Identify barriers to discharge w/patient and caregiver  - Arrange for needed discharge resources and transportation as appropriate  - Identify discharge learning needs (meds, wound care, etc )  - Arrange for interpretive services to assist at discharge as needed  - Refer to Case Management Department for coordinating discharge planning if the patient needs post-hospital services based on physician/advanced practitioner order or complex needs related to functional status, cognitive ability, or social support system  Outcome: Progressing     Problem: Knowledge Deficit  Goal: Patient/family/caregiver demonstrates understanding of disease process, treatment plan, medications, and discharge instructions  Description: Complete learning assessment and assess knowledge base    Interventions:  - Provide teaching at level of understanding  - Provide teaching via preferred learning methods  Outcome: Progressing     Problem: SKIN/TISSUE INTEGRITY - ADULT  Goal: Skin Integrity remains intact(Skin Breakdown Prevention)  Description: Assess:  -Perform Fabrice assessment every shift and PRN  -Clean and moisturize skin every shift and PRN  -Inspect skin when repositioning, toileting, and assisting with ADLS  -Assess under medical devices  -Assess extremities for adequate circulation and sensation     Bed Management:  -Have minimal linens on bed & keep smooth, unwrinkled  -Change linens as needed when moist or perspiring  -Avoid sitting or lying in one position for more than 2 hours while in bed  -Keep HOB at 30 degrees     Toileting:  -Offer bedside commode  -Assess for incontinence every 2 hours  -Use incontinent care products after each incontinent episode    Activity:  -Mobilize patient 2 times a day  -Encourage activity and walks on unit  -Encourage or provide ROM exercises   -Turn and reposition patient every 2 Hours  -Use appropriate equipment to lift or move patient in bed  -Instruct/ Assist with weight shifting every 120 minutes when out of bed in chair  -Consider limitation of chair time 2 hour intervals    Skin Care:  -Avoid use of baby powder, tape, friction and shearing, hot water or constrictive clothing  -Relieve pressure over bony prominences using foam wedge  -Do not massage red bony areas    Next Steps:  -Teach patient strategies to minimize risks   -Consider consults to  interdisciplinary teams  Outcome: Progressing  Goal: Incision(s), wounds(s) or drain site(s) healing without S/S of infection  Description: INTERVENTIONS  - Assess and document dressing, incision, wound bed, drain sites and surrounding tissue  - Provide patient and family education  - Perform skin care/dressing changes every shift, PRN and per orders  Outcome: Progressing  Goal: Pressure injury heals and does not worsen  Description: Interventions:  - Implement low air loss mattress or specialty surface (Criteria met)  - Apply silicone foam dressing  - Instruct/assist with weight shifting every 120 minutes when in chair   - Limit chair time to 2 hour intervals  - Use special pressure reducing interventions such as foam wedge when in chair   - Apply fecal or urinary incontinence containment device   - Perform passive or active ROM  - Turn and reposition patient & offload bony prominences every 2 hours   - Utilize friction reducing device or surface for transfers   - Consider consults to  interdisciplinary teams  - Use incontinent care products after each incontinent episode  - Consider nutrition services referral as needed  Outcome: Progressing     Problem: Prexisting or High Potential for Compromised Skin Integrity  Goal: Skin integrity is maintained or improved  Description: INTERVENTIONS:  - Identify patients at risk for skin breakdown  - Assess and monitor skin integrity  - Assess and monitor nutrition and hydration status  - Monitor labs   - Assess for incontinence   - Turn and reposition patient  - Assist with mobility/ambulation  - Relieve pressure over bony prominences  - Avoid friction and shearing  - Provide appropriate hygiene as needed including keeping skin clean and dry  - Evaluate need for skin moisturizer/barrier cream  - Collaborate with interdisciplinary team   - Patient/family teaching  - Consider wound care consult   Outcome: Progressing     Problem: MOBILITY - ADULT  Goal: Maintain or return to baseline ADL function  Description: INTERVENTIONS:  -  Assess patient's ability to carry out ADLs; assess patient's baseline for ADL function and identify physical deficits which impact ability to perform ADLs (bathing, care of mouth/teeth, toileting, grooming, dressing, etc )  - Assess/evaluate cause of self-care deficits   - Assess range of motion  - Assess patient's mobility; develop plan if impaired  - Assess patient's need for assistive devices and provide as appropriate  - Encourage maximum independence but intervene and supervise when necessary  - Involve family in performance of ADLs  - Assess for home care needs following discharge   - Consider OT consult to assist with ADL evaluation and planning for discharge  - Provide patient education as appropriate  Outcome: Progressing  Goal: Maintains/Returns to pre admission functional level  Description: INTERVENTIONS:  - Perform BMAT or MOVE assessment daily    - Set and communicate daily mobility goal to care team and patient/family/caregiver  - Collaborate with rehabilitation services on mobility goals if consulted  - Perform Range of Motion 2 times a day  - Reposition patient every 2 hours    - Dangle patient 2 times a day  - Stand patient 2 times a day  - Ambulate patient 2 times a day  - Out of bed to chair 2 times a day   - Out of bed for meals 2 times a day  - Out of bed for toileting  - Record patient progress and toleration of activity level   Outcome: Progressing

## 2023-05-10 NOTE — PROGRESS NOTES
-- Patient: Carina Ravi  -- MRN: [id number]  -- Aidin Request ID: [id number]  -- Level of care reserved: translation missing: en provider  provider_type  -- Partner Reserved:  -- Clinical needs requested:  -- Geography searched:  -- Start of Service:  -- Request sent: 8:50am EDT on 5/3/2023 by Angelita Arias  -- Partner reserved:  -- Choice list shared: 10:04am EDT on 5/10/2023 by Sammy Graves

## 2023-05-10 NOTE — CASE MANAGEMENT
Case Management Progress Note    Patient name Tanja Sandifer  Location [address] [id number] MRN [id number]  : 1965 Date 5/10/2023       LOS (days): 9  Geometric Mean LOS (GMLOS) (days): 4 00  Days to GMLOS:-4 8        OBJECTIVE:        Current admission status: Inpatient  Preferred Pharmacy:   [address] [place] [address] #[id number] Mara Arias [address] [place] [id number]  Phone: [phone] Fax: [phone] [address] [place] [address]  Phone: [phone] Fax: [phone]    Primary Care Provider: Simin Szymanski MD    Primary Insurance: BLUE CROSS  Secondary Insurance:     PROGRESS NOTE:    CM received notification from Physician Advisor:          Denial Overturned to Approval   Rationale for denial upheld: N/A   Insurer: Shannon monsalve Medical Director: Dr Jun Serrato   Date outcome received: 5/10/2023   Facility: [facility] # if applicable: [id number]     Start of care date if applicable: 8057  Care Manager Notified: Vito Major

## 2023-05-10 NOTE — ASSESSMENT & PLAN NOTE
· S/p reconstruction of proximal tibia today however may require cement spacer as well as possible external fixator placement  · Pain management per primary team  Ortho primary  · Pain control per primary  · DVT prophylaxis Lovenox per primary

## 2023-05-10 NOTE — CASE MANAGEMENT
[address] 48 has received approved authorization from insurance: Becky Pena in by Rep:BONI Bo P# [phone]   Authorization received for: Acute Rehab  Facility: [address] [place] [address] Road #:[id number]  Start of Care:5/10/23  Next Review Date:5/15/23  Care Coordinator: Agusto Dahl  P#: [phone]   Submit next review to [facility]#[phone]   Care Manager notified: Sai Gomez

## 2023-05-11 LAB
ALBUMIN SERPL BCP-MCNC: 2.9 G/DL (ref 3.5–5)
ALP SERPL-CCNC: 147 U/L (ref 34–104)
ALT SERPL W P-5'-P-CCNC: 11 U/L (ref 7–52)
ANION GAP SERPL CALCULATED.3IONS-SCNC: 8 MMOL/L (ref 4–13)
AST SERPL W P-5'-P-CCNC: 24 U/L (ref 13–39)
BASOPHILS # BLD AUTO: 0.07 THOUSANDS/ÂΜL (ref 0–0.1)
BASOPHILS NFR BLD AUTO: 1 % (ref 0–1)
BILIRUB SERPL-MCNC: 0.42 MG/DL (ref 0.2–1)
BUN SERPL-MCNC: 11 MG/DL (ref 5–25)
CALCIUM ALBUM COR SERPL-MCNC: 9.1 MG/DL (ref 8.3–10.1)
CALCIUM SERPL-MCNC: 8.2 MG/DL (ref 8.4–10.2)
CHLORIDE SERPL-SCNC: 100 MMOL/L (ref 96–108)
CO2 SERPL-SCNC: 26 MMOL/L (ref 21–32)
CREAT SERPL-MCNC: 0.63 MG/DL (ref 0.6–1.3)
EOSINOPHIL # BLD AUTO: 0.22 THOUSAND/ÂΜL (ref 0–0.61)
EOSINOPHIL NFR BLD AUTO: 2 % (ref 0–6)
ERYTHROCYTE [DISTWIDTH] IN BLOOD BY AUTOMATED COUNT: 13.4 % (ref 11.6–15.1)
GFR SERPL CREATININE-BSD FRML MDRD: 107 ML/MIN/1.73SQ M
GLUCOSE P FAST SERPL-MCNC: 124 MG/DL (ref 65–99)
GLUCOSE SERPL-MCNC: 124 MG/DL (ref 65–140)
GLUCOSE SERPL-MCNC: 135 MG/DL (ref 65–140)
GLUCOSE SERPL-MCNC: 150 MG/DL (ref 65–140)
GLUCOSE SERPL-MCNC: 189 MG/DL (ref 65–140)
GLUCOSE SERPL-MCNC: 211 MG/DL (ref 65–140)
GLUCOSE SERPL-MCNC: 84 MG/DL (ref 65–140)
HCT VFR BLD AUTO: 27.4 % (ref 36.5–49.3)
HGB BLD-MCNC: 8.7 G/DL (ref 12–17)
IMM GRANULOCYTES # BLD AUTO: 0.18 THOUSAND/UL (ref 0–0.2)
IMM GRANULOCYTES NFR BLD AUTO: 2 % (ref 0–2)
LYMPHOCYTES # BLD AUTO: 2.24 THOUSANDS/ÂΜL (ref 0.6–4.47)
LYMPHOCYTES NFR BLD AUTO: 24 % (ref 14–44)
MCH RBC QN AUTO: 29.8 PG (ref 26.8–34.3)
MCHC RBC AUTO-ENTMCNC: 31.8 G/DL (ref 31.4–37.4)
MCV RBC AUTO: 94 FL (ref 82–98)
MONOCYTES # BLD AUTO: 1.12 THOUSAND/ÂΜL (ref 0.17–1.22)
MONOCYTES NFR BLD AUTO: 12 % (ref 4–12)
NEUTROPHILS # BLD AUTO: 5.54 THOUSANDS/ÂΜL (ref 1.85–7.62)
NEUTS SEG NFR BLD AUTO: 59 % (ref 43–75)
NRBC BLD AUTO-RTO: 0 /100 WBCS
PLATELET # BLD AUTO: 501 THOUSANDS/UL (ref 149–390)
PMV BLD AUTO: 10.5 FL (ref 8.9–12.7)
POTASSIUM SERPL-SCNC: 3.5 MMOL/L (ref 3.5–5.3)
PROT SERPL-MCNC: 6.3 G/DL (ref 6.4–8.4)
RBC # BLD AUTO: 2.92 MILLION/UL (ref 3.88–5.62)
SODIUM SERPL-SCNC: 134 MMOL/L (ref 135–147)
WBC # BLD AUTO: 9.37 THOUSAND/UL (ref 4.31–10.16)

## 2023-05-11 PROCEDURE — 97110 THERAPEUTIC EXERCISES: CPT

## 2023-05-11 PROCEDURE — 97535 SELF CARE MNGMENT TRAINING: CPT

## 2023-05-11 PROCEDURE — 97110 THERAPEUTIC EXERCISES: CPT | Performed by: PHYSICAL THERAPIST

## 2023-05-11 PROCEDURE — 82948 REAGENT STRIP/BLOOD GLUCOSE: CPT

## 2023-05-11 PROCEDURE — 97166 OT EVAL MOD COMPLEX 45 MIN: CPT

## 2023-05-11 PROCEDURE — 97542 WHEELCHAIR MNGMENT TRAINING: CPT | Performed by: PHYSICAL THERAPIST

## 2023-05-11 PROCEDURE — 80053 COMPREHEN METABOLIC PANEL: CPT | Performed by: PHYSICAL MEDICINE & REHABILITATION

## 2023-05-11 PROCEDURE — 97530 THERAPEUTIC ACTIVITIES: CPT | Performed by: PHYSICAL THERAPIST

## 2023-05-11 PROCEDURE — 97530 THERAPEUTIC ACTIVITIES: CPT

## 2023-05-11 PROCEDURE — 99223 1ST HOSP IP/OBS HIGH 75: CPT | Performed by: PHYSICAL MEDICINE & REHABILITATION

## 2023-05-11 PROCEDURE — 97163 PT EVAL HIGH COMPLEX 45 MIN: CPT | Performed by: PHYSICAL THERAPIST

## 2023-05-11 PROCEDURE — 85025 COMPLETE CBC W/AUTO DIFF WBC: CPT | Performed by: PHYSICAL MEDICINE & REHABILITATION

## 2023-05-11 RX ORDER — METHOCARBAMOL 500 MG/1
500 TABLET, FILM COATED ORAL EVERY 6 HOURS PRN
Status: DISCONTINUED | OUTPATIENT
Start: 2023-05-11 | End: 2023-05-30 | Stop reason: HOSPADM

## 2023-05-11 RX ADMIN — ATORVASTATIN CALCIUM 40 MG: 40 TABLET, FILM COATED ORAL at 08:20

## 2023-05-11 RX ADMIN — INSULIN GLARGINE 20 UNITS: 100 INJECTION, SOLUTION SUBCUTANEOUS at 21:11

## 2023-05-11 RX ADMIN — TAMSULOSIN HYDROCHLORIDE 0.4 MG: 0.4 CAPSULE ORAL at 16:53

## 2023-05-11 RX ADMIN — DOCUSATE SODIUM 100 MG: 100 CAPSULE, LIQUID FILLED ORAL at 08:20

## 2023-05-11 RX ADMIN — LEVOTHYROXINE SODIUM 150 MCG: 75 TABLET ORAL at 05:31

## 2023-05-11 RX ADMIN — INSULIN LISPRO 4 UNITS: 100 INJECTION, SOLUTION INTRAVENOUS; SUBCUTANEOUS at 21:11

## 2023-05-11 RX ADMIN — PANTOPRAZOLE SODIUM 40 MG: 40 TABLET, DELAYED RELEASE ORAL at 05:31

## 2023-05-11 RX ADMIN — ENOXAPARIN SODIUM 40 MG: 40 INJECTION SUBCUTANEOUS at 05:31

## 2023-05-11 RX ADMIN — ACETAMINOPHEN 650 MG: 325 TABLET ORAL at 21:18

## 2023-05-11 RX ADMIN — VERAPAMIL HYDROCHLORIDE 360 MG: 180 TABLET ORAL at 21:11

## 2023-05-11 RX ADMIN — INSULIN LISPRO 15 UNITS: 100 INJECTION, SOLUTION INTRAVENOUS; SUBCUTANEOUS at 08:19

## 2023-05-11 RX ADMIN — INSULIN LISPRO 2 UNITS: 100 INJECTION, SOLUTION INTRAVENOUS; SUBCUTANEOUS at 11:59

## 2023-05-11 RX ADMIN — INSULIN LISPRO 2 UNITS: 100 INJECTION, SOLUTION INTRAVENOUS; SUBCUTANEOUS at 08:18

## 2023-05-11 RX ADMIN — ASPIRIN 81 MG 81 MG: 81 TABLET ORAL at 08:20

## 2023-05-11 RX ADMIN — CLOPIDOGREL BISULFATE 75 MG: 75 TABLET ORAL at 08:20

## 2023-05-11 RX ADMIN — ZOLPIDEM TARTRATE 10 MG: 5 TABLET ORAL at 21:15

## 2023-05-11 RX ADMIN — INSULIN LISPRO 15 UNITS: 100 INJECTION, SOLUTION INTRAVENOUS; SUBCUTANEOUS at 11:59

## 2023-05-11 NOTE — PROGRESS NOTES
05/11/23 0900   Patient Data   Rehab Impairment Impairment of mobility, safety and Activities of Daily Living (ADLs) due to Orthopedic Disorders:  08 9  Other Orthopedic Closed fracture of medial plateau of right tibia   Etiologic Diagnosis s/p ORIF R tibia   Home Setup   Type of Home Single Level  (plus basement but seldom goes to basement)   Method of Entry Stairs;Hand Rail Right  (friend installed two wooden planks over stairs to act as ramp)   Number of Stairs 3  (ramp over stairs)   Number of Stairs in Home 13   In Home Hand Rail Right   First Floor Bathroom Tub; Shower;Combo;Grab Bars  (suction cup grab bars with one metal grab bar on side on shower)   First Floor Setup Available Yes  (sleeps in bed with controls)   Home Modifications Necessary?   (lives with wife, dog)   Home Modification Comment daughter in [facility], two stepsons local to pt; pt reports wife and stepsons have been helpful in the past   Available Equipment Miachel Gary; Bedside Commode  (grab bar around toilet)   Prior IADL Participation   Money Management Identify Money;Estimate Costs;Estimate Change;Combine Bills;Manage Checkbook  (splits with wife)   Meal Preparation Full Participation   Laundry Full Participation   Home Cleaning Full Participation   Prior Level of Function   Self-Care 3  Independent - Patient completed the activities by him/herself, with or without an assistive device, with no assistance from a helper   (wife set up sponge bath basin and pt completed all tasks independently)   Functional Cognition 3  Independent - Patient completed the activities by him/herself, with or without an assistive device, with no assistance from a helper  Prior Assistance Needed for Driving;Household Chores/Cleaning;Meal Preparation;Medication Management;Money Management; Shopping   Prior Device Used A   Manual wheelchair  (has two w/c, one standard and one transport chair to get into bathroom)   Falls in the Last Year   Number of falls in the past 12 months 4   Type of Injury Associated with Fall   (latest fall before last rehab stay in March, resulted in previous tibia fx)   Patient Preference   Nickname (Patient Preference) Jamie Lovett   Patient Normally Wakes at 0900   Psychosocial   Psychosocial (WDL) X   Patient Behaviors/Mood Flat affect;Irritable   Restrictions/Precautions   Precautions Bed/chair alarms; Fall Risk;Limb alert   RLE Weight Bearing Per Order NWB   ROM Restrictions   (RLE immobilizer all times)   Braces or Orthoses Knee immobilizer  (RLE)   Pain Assessment   Pain Assessment Tool 0-10   Pain Score No Pain   Eating Assessment   Type of Assistance Needed Independent   Eating CARE Score 6   Oral Hygiene   Type of Assistance Needed Set-up / clean-up   Physical Assistance Level No physical assistance   Oral Hygiene CARE Score 5   Grooming   Able To Initiate Tasks; Wash/Dry Face;Brush/Clean Teeth   Limitation Noted In Safety;Strength   Tub/Shower Transfer   Reason Not Assessed Sponge Bath   Shower/Bathe Self   Type of Assistance Needed Physical assistance   Physical Assistance Level 25% or less   Comment assist for R foot   Shower/Bathe Self CARE Score 3   Bathing   Assessed Bath Style Sponge Bath   Anticipated D/C Bath Style Shower;Sponge Bath   Able to Donna Bam No   Able to Wash/Rinse/Dry (body part) Left Arm;Right Arm;L Upper Leg;R Upper Leg;Abdomen; Chest;Perineal Area; Buttocks  (RLE from just proximal knee to mid-shin covered in immobilizer)   Limitations Noted in Balance; Endurance;ROM;Safety;Strength   Positioning Seated;Supine  (sat EOB, returned to supine to wash levi/buttocks)   Findings  given long handled sponge at end of session for future ADL use   Dressing/Undressing Clothing   Remove UB Clothes Pullover Shirt   Don UB Clothes Pullover Shirt   Type of Assistance Needed Set-up / clean-up   Physical Assistance Level No physical assistance   Upper Body Dressing CARE Score 5   Comment pt did not want to change shorts as they were donned last PM   Limitations Noted In Balance; Endurance; Safety;Strength;ROM   Positioning Sit Edge Of Bed   Putting On/Taking Off Footwear   Type of Assistance Needed Physical assistance   Physical Assistance Level 51%-75%   Comment assist to doff/don R sock   Putting On/Taking Off Footwear CARE Score 2   Toileting Hygiene   Type of Assistance Needed Set-up / clean-up   Physical Assistance Level No physical assistance   Comment used urinal   Toileting Hygiene CARE Score 5   Roll Left and Right   Type of Assistance Needed Supervision   Roll Left and Right CARE Score 4   Sit to Lying   Type of Assistance Needed Independent   Physical Assistance Level No physical assistance   Sit to Lying CARE Score 6   Lying to Sitting on Side of Bed   Type of Assistance Needed Supervision   Lying to Sitting on Side of Bed CARE Score 4   Picking Up Object   Reason if not Attempted Safety concerns   Picking Up Object CARE Score 88   Comprehension   QI: Comprehension 4  Undestands: Clear comprehension without cues or repetitions   Comprehension (FIM) 7 - Understands complex/abstract conversation in a reasonable time w/o devices or helper  Expression   QI: Expression 4  Express complex messages without difficulty and with speech that is clear and easy to [place]   Expression (FIM) 6 - Expresses complex/abstract but requires:  more time   Social Interaction   Social Interaction (FIM) 6 - Interacts appropriately with others BUT requires extra  time   Problem Solving   Problem solving (FIM) 6 - Solves complex problems BUT requires extra time   Memory   Memory (FIM) 7 - Remembers daily routines   RUE Assessment   RUE Assessment WFL   LUE Assessment   LUE Assessment WFL   Sensation   Light Touch No apparent deficits   Propioception No apparent deficits   Cognition   Overall Cognitive Status WFL   Arousal/Participation Alert; Responsive; Cooperative   Attention Within functional limits   Orientation Level Oriented X4   Memory "Within functional limits   Following Commands Follows all commands and directions without difficulty   Comments 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 "[employer]  Enjoys doing outdoor work, grocery shopping/walking around stores  Vision   Vision Comments glasses   Discharge Information   Patient's Discharge Plan Pt would like to return home with wife   Patient's Rehab Expectations \"use the commode without assistance\"   Impressions Pt seen this date for OT IE following hospitalization for ORIF of R thor  ADL session completed; current LOF and details listed in respective sections  Pt's barriers to d/c include decreased strength throughout but especially RLE s/p tibia fx, decreased balance, NWB RLE with immobilizer, and decreased activity tolerance; all affect independence in self care and fxl transfers   Pt would benefit from continued skilled OT services in order to address listed barriers and prepare for safe d/c    OT Therapy Minutes   OT Time In 0900   OT Time Out 1030   OT Total Time (minutes) 90   OT Mode of treatment - Individual (minutes) 90     "

## 2023-05-11 NOTE — ASSESSMENT & PLAN NOTE
Lab Results   Component Value Date    HGBA1C 7 9 (H) 04/24/2023       Recent Labs     05/29/23  1148 05/29/23  1620 05/29/23 2013 05/30/23  0751   POCGLU 197* 134 126 249*       Blood Sugar Average: Last 72 hrs:  (P) 279 [id number]   · Continue Lantus 25 units daily HS, 20 units humalog TID AC, SSI  · Home: Lantus 30 units daily HS, Humalog 20 units TID AC  · Carb controlled diet   · May resume home regimen once discharged

## 2023-05-11 NOTE — ASSESSMENT & PLAN NOTE
· Complex history   · Arrived 5/1 for surgical intervention of ongoing right tibial plateau fracture  · Previously in SNF but found to have varus malalignment, instability of the joint, significant comminution and bone void  · S/p ORIF on 05/01 with Dr Amy Castaneda  · NWB for 10-12 weeks  · Followed up with Dr Amy Castaneda on 5/16/23  · For DVT ppx x6 weeks since post-op- Lovenox discontinued per patient's request  Lynn How initiated  · Continue Xarelto for remainder of 6 weeks  · Price checked at $50 for 30-day supply  · Home aspirin and Plavix held while on Xarelto, may resume once Xarelto is completed   · Noted to have Cellulitis levi-incision site at Ortho follow up appointment  · Continue IV Ancef day 14/14  · Will not complete 2/3 today as he is being discharged at 1230  · Will send Keflex 750 mg x4 daily for one day supply   · Continue Probiotic BID while on antibiotic  · Gentle ROM of the knee, ok for AROM, hold PROM at this time   · Follow up in 2 weeks with ortho for repeat XR  · Patient made aware to call to schedule next appointment   · Sutures will not be removed until patient follows up with Ortho as outpatient  · Repeat XR 5/21: improved alignment of proximal tibial fractures s/p ORIF   Intact hardware   · Acute chomprehensive interdisciplinary inpatient rehabilitation to include intensive skilled therapies as outlines with oversight and management by a rehabilitation Physician Assistant overseen by rehabilitation physician as well as inpatient rehabilitation nursing, case management and weekly interdisciplinary team meeting

## 2023-05-11 NOTE — UTILIZATION REVIEW
NOTIFICATION OF ADMISSION DISCHARGE   This is a Notification of Discharge from [address]  Please be advised that this patient has been discharge from our facility  Below you will find the admission and discharge date and time including the patient’s disposition  UTILIZATION REVIEW CONTACT:  Angel Luis Burkett  Utilization   Network Utilization Review Department  Phone: [phone] x carefully listen to the prompts  All voicemails are confidential   Email: [email or web address]     ADMISSION INFORMATION  PRESENTATION DATE: 5/1/2023  9:52 AM  OBERVATION ADMISSION DATE:   INPATIENT ADMISSION DATE: 5/1/23  4:26 PM   DISCHARGE DATE: 5/10/2023  3:18 PM   [facility]    IMPORTANT INFORMATION:  Send all requests for admission clinical reviews, approved or denied determinations and any other requests to dedicated fax number below belonging to the campus where the patient is receiving treatment   List of dedicated fax numbers:  [address] DENIALS (Administrative/Medical Necessity) [phone]   [address] (Maternity/NICU/Pediatrics) [phone]   [facility] [phone]   [facility] 87 [phone]   [address] [phone]   [address] [facility] [phone] 90 [facility] [phone]   [address] [place] [address] 119 [phone]   [facility]  [phone]   [address] [facility] [phone]   412 [facility] [address] [facility] [phone]

## 2023-05-11 NOTE — PROGRESS NOTES
"   05/11/23 1300   Pain Assessment   Pain Assessment Tool 0-10   Pain Score 5   Pain Location/Orientation Orientation: Right;Location: Knee   Restrictions/Precautions   Precautions Bed/chair alarms; Fall Risk;Limb alert   RLE Weight Bearing Per Order NWB   ROM Restrictions   (RLE imobilizer at all times)   Braces or Orthoses Knee immobilizer  (RLE)   Toileting Hygiene   Comment OT emptied urinal   Exercise Tools   Other Exercise Tool 1 UE strengthening in bilat UE using 3# dumbbell  ,3x10: elbow flexion/extension, protraction/retraction, internal/external rotation, pronatoin/supination, shoulder flexion/extension   Cognition   Overall Cognitive Status WFL   Arousal/Participation Alert; Responsive; Cooperative   Attention Within functional limits   Orientation Level Oriented X4   Memory Within functional limits   Following Commands Follows all commands and directions without difficulty   Assessment   Treatment Assessment Pt seen for brief OT session this PM focusing on UE strengthening  Pt took rest breaks as needed and reported feeling \"more tired than I thought\" during exercises  Pt reported moderate pain in R knee that was tolerable  Pt continues with barriers to d/c of decreased strength throughout but especially RLE s/p tibia fx, decreased balance, WNB RLE with knee immobilizer, and decreased activity tolerance; all affect independence in self care and fxl transfers  Pt would benefit from continued skilled OT services in order to address listed barriers and prepare for safe d/c  Prognosis Good   Problem List Decreased strength;Decreased range of motion;Decreased endurance; Impaired balance;Orthopedic restrictions;Pain   Plan   Treatment/Interventions ADL retraining;Functional transfer training;LE strengthening/ROM; Therapeutic exercise; Endurance training;Patient/family training;Equipment eval/education; Compensatory technique education;OT   Progress Progressing toward goals   OT Therapy Minutes   OT Time In 1300 " OT Time Out 1327   OT Total Time (minutes) 27   OT Mode of treatment - Individual (minutes) 0   OT Mode of treatment - Concurrent (minutes) 27

## 2023-05-11 NOTE — ASSESSMENT & PLAN NOTE
· Home Plavix 75 mg daily currently on hold with Xarelto added for DVT ppx  · May resume once Xarelto is completed

## 2023-05-11 NOTE — ASSESSMENT & PLAN NOTE
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

## 2023-05-11 NOTE — PCC CARE MANAGEMENT
"Initial assessment & orientation to [facility] with Pt, and phone contact with Pt's wife, who expressed understanding & agreement  Pt resides in a SS home with his wife, who is retired & drives  There are 3 steps in, which is Pt's self-ststed primary goal of rehab \"to do 3 stairs so I can get in the bathroom\"  The stairs to enter the residence have planks on them to serve as a ramp  Pt expressed that he is pleased to be in this facility, as he has had negative experiences in other rehabs out of state  Provided him with a supportive outlet & provided encouragement  Mood was appropriate for situation  Discussed role of team members & reviewed [address] with Pt & Pt's spouse, who expressed understanding & agreement  See UR section for insurance details; Pt was initially denied & needed a peer to peer to be admitted into [facility]; Pt & spouse both expressing that \"2 weeks won't be enough\"; discussed taking a day by day approach  SW will continue to monitor & assist as needed with [address]  5/16 - Tx team recommendations reviewed with patient, who expressed understanding  Pt declined for this worker to call his significant other, stating that he prefers to do so himself  Target DC date is 5/26 with [facility] (PT, OT); a list of providers was provided to Pt & a referral will be made based on Pt preference  Pt became tearful in discussing the DC plan; he expressed feeling that he does not think that is enough time to reach his mobility goals & stated \"I don't want to rely on others to help me anymore\", and reflected on the manner in which his self-identity has been negatively impacted by a loss of independence; provided validation & emotional support & encouragement  SW will continue to monitor & assist as needed with Tx & DC planning     5/23/23 - Tx team recommendations reviewed with patient & spouse, who expressed understanding & agreement   Target DC date is Tuesday, 5/30 with [facility] (Nsg, PT, OT); a list of providers was " provided to Pt & a referral will be made based on Pt preference  Pt will transition to oral antibiotics on 5/30  Wound care has included dressing changes BID, which needs to be taught to family  FT arranged for Friday at 12:30 to review therapy recommendations with PT & OT, & wound care/dressing changes with nursing  SW will continue to monitor & assist as needed with Tx & DC planning       Update to be faxed to insurance company today noting the medical necessity of extension through 5/30/23 per [facility] medical team

## 2023-05-11 NOTE — ASSESSMENT & PLAN NOTE
Lab Results   Component Value Date    CREATININE 0 67 05/29/2023    CREATININE 0 68 05/22/2023    CREATININE 0 77 05/17/2023    EGFR 104 05/29/2023    EGFR 103 05/22/2023    EGFR 98 05/17/2023   · Followed in acute care with Nephrology  · S/P IV Fluids  · HCTZ d/c in setting of hyponatremia

## 2023-05-11 NOTE — PROGRESS NOTES
05/11/23 1100   Patient Data   Rehab Impairment Rehab Diagnosis: Impairment of mobility, safety and Activities of Daily Living (ADLs) due to Orthopedic Disorders:  08 9  Other Orthopedic Closed fracture of medial plataeu of right tibia   Etiologic Diagnosis Per EMR: Original injury:  Patient slipped and fell backward down his basement stairs on 1/27/23, twisting his right knee  He went to the ED on 1/31/23 XR taken in the ED diagnosed him with a right knee tibial plateau fracture  He has been taking morphine prescribed by the ED  Patient has been NWB since the injury  He presents to the office today in a wheel chair  Pain well localized to the right knee without radiation proximally or distally  Pain exacerbated by motion and relieved by rest  Denies pain elsewhere  Ultimately discharged to a rehab in [place] as he elected non-operative intervention at that time  Ultimately, returned for follow up and it was deemed appropriate for OR  Pt was taken to the OR on 5/01/2023  Surgery went without complications and pt was discharged to the PACU in a stable condition and was transferred to the floor  Pain was well managed with oral and IV analgesics  Lovenox was started postoperatively for DVT prophylaxis  SLIM was consulted postoperatively for medical management  Patient began PT/OT on POD#1  Urology was consulted on POD#1 for urinary retention and recommendations were appreciated  On POD#2 patient had hyponatremia and nephrology was consulted  Patient was medically cleared for discharge on POD#4  Patient continued to be seen by PT/OT and recommendations were made for rehab facility  On discharge date pt was cleared by PT and the medicine team and determined to be safe for discharge to rehab  Daily discussion was had with the patient, nursing staff, orthopaedic team, and family members if present  All questions were answered to the patients satisfaction    At this time pt is NWBing right lower extremity with knee immobilizer intact at all times  May change dressings as needed  May shower at this time and allow warm soapy water to run over incision  Do not scrub incision  Follow-up as scheduled with Dr Amy Bravo on 5/16, otherwise call for appt  on    Support System   Name [facility]   Relationship Spouse   Home Setup   Type of Home Single Level   Method of Entry Stairs   Number of Stairs 3   Number of Stairs in Home 13   In Home Hand Rail Right   First Floor Setup Available Yes   Available Equipment Rico Guzmán; Bedside Commode   Baseline Information   Transportation Family/friends drive   Prior Device(s) Used Wheelchair  (Typically wheelchair, had not returned to [employer])   Prior Level of Function   Indoor-Mobility (Ambulation) 3  Independent - Patient completed the activities by him/herself, with or without an assistive device, with no assistance from a helper  (Wheelchair level)   Stairs 2  Needed Some Help - Patient needed a partial assistance from another person to complete activities  Falls in the Last Year   Number of falls in the past 12 months 4   Restrictions/Precautions   Precautions Bed/chair alarms; Fall Risk;Pain  ([address] right LE with KI in place)   RLE Weight Bearing Per Order NWB   Braces or Orthoses Knee immobilizer   Pain Assessment   Pain Assessment Tool 0-10   Pain Score 8   Pain Location/Orientation Orientation: Right;Location: Knee   Transfer Bed/Chair/Wheelchair   Positioning Concerns Other  (KI in place)   Limitations Noted In Balance;Confidence; Coordination; Endurance;Pain Management;UE Strength;LE Strength;Sensation   Findings KI in place, NWBing on right LE   Type of Assistance Needed Physical assistance   Physical Assistance Level 25% or less   Comment CGA   Chair/Bed-to-Chair Transfer CARE Score 3   Roll Left and Right   Type of Assistance Needed Supervision   Physical Assistance Level No physical assistance   Comment Side rails in use   Roll Left and Right CARE Score 4   Sit to Lying Type of Assistance Needed Physical assistance   Physical Assistance Level 25% or less   Comment Assist with right LE onto bed   Sit to Lying CARE Score 3   Lying to Sitting on Side of Bed   Type of Assistance Needed Supervision   Physical Assistance Level No physical assistance   Comment Side rails in use   Lying to Sitting on Side of Bed CARE Score 4   Sit to Stand   Reason if not Attempted Safety concerns   Sit to Stand CARE Score 88   Picking Up Object   Reason if not Attempted Safety concerns   Picking Up Object CARE Score 88   Car Transfer   Reason if not Attempted Environmental limitations   Car Transfer CARE Score 10   Walk 10 Feet   Reason if not Attempted Safety concerns   Walk 10 Feet CARE Score 88   Walk 50 Feet with Two Turns   Reason if not Attempted Safety concerns   Walk 50 Feet with Two Turns CARE Score 88   Walk 150 Feet   Reason if not Attempted Safety concerns   Walk 150 Feet CARE Score 88   Walking 10 Feet on Uneven Surfaces   Reason if not Attempted Safety concerns   Walking 10 Feet on Uneven Surfaces CARE Score 88   Wheelchair mobility   Type of Wheelchair Used 1  Manual   Method Right upper extremity; Left upper extremity   Assistance Provided For Replace armrests; Remove armrests;Replace Leg Rest;Remove Leg Rest   Distance Level Surface (feet) 35 ft  (45 ft x 1, 25 ft x 3, 39 ft x 1)   Distance Wheeled 3% Grade 10 ft   Findings Frequent rest breaks required due to UB fatigue   Curb or Single Stair   Reason if not Attempted Activity not applicable   1 Step (Curb) CARE Score 9   4 Steps   Reason if not Attempted Activity not applicable   4 Steps CARE Score 9   12 Steps   Reason if not Attempted Activity not applicable   12 Steps CARE Score 9   RLE Assessment   RLE Assessment X   Strength RLE   R Hip Flexion 3-/5   R Hip Extension 3-/5   R Hip ABduction 3-/5   R Hip ADduction 3-/5   R Knee Flexion   (NT)   R Knee Extension   (NT)   LLE Assessment   LLE Assessment X   Strength LLE   L Hip Flexion 3-/5   L Hip Extension 3-/5   L Hip ABduction 3-/5   L Hip ADduction 3-/5   L Knee Flexion 3/5   L Knee Extension 3/5   L Ankle Dorsiflexion 3-/5   L Ankle Plantar Flexion 3/5   Coordination   Movements are Fluid and Coordinated 1   Sensation   Light Touch Partial deficits in the RLE;Partial deficits in the LLE   Propioception No apparent deficits   Cognition   Orientation Level Oriented X4   Therapeutic Exercise   Therapeutic Exercise/Activity Supine LE TE   Discharge Information   Patient's Discharge Plan Pt would like to return home with wife   Patient's Rehab Expectations To be more independent   Impressions Pt is 59year old male seen for PT evaluation on 5/11/2023 s/p admit to [facility] on 5/10/2023 w/ closed fx of medial plateau of right tibia  Orders placed at admission  Performed at least 2 patient identifiers during session: Name and wristband  Comorbidities affecting pt's physical performance at time of assessment include: Broken internal right knee prosthesis, CKD, colon polyp, DM, DKA vs HHS, hyperlipidemia, HTN, thyroid CA  LOF prior to admission was independent prior to fall resulting in initial injury  Wheelchair level since that time  Personal factors affecting pt at time of IE include: weakness, limited flexibility, decreased balance, endurance, increased pain  Please find objective findings from PT assessment regarding body systems outlined above with impairments and limitations including weakness, impaired balance, decreased endurance, pain, decreased activity tolerance and fall risk, as well as mobility assessment  Pt's clinical presentation warrants participation in multidisciplinary acute rehab program at 3 or greater hours of therapy per day  Pt to benefit from continued PT tx to address deficits as defined above and maximize level of functional independent mobility and consistency  PT for improved safe return home with maximized functional mobility     PT Therapy Minutes   PT Time In 1100   PT Time Out 1300   PT Total Time (minutes) 120   PT Mode of treatment - Individual (minutes) 90   PT Mode of treatment - Concurrent (minutes) 0   PT Mode of treatment - Group (minutes) 0   PT Mode of treatment - Co-treat (minutes) 0   PT Mode of Treatment - Total time(minutes) 90 minutes   PT Cumulative Minutes 90   Cumulative Minutes   Cumulative therapy minutes 90

## 2023-05-11 NOTE — CASE MANAGEMENT
"Initial assessment & orientation to [facility] with Pt, and phone contact with Pt's wife, who expressed understanding & agreement  Pt resides in a SS home with his wife, who is retired & drives  There are 3 steps in, which is Pt's self-ststed primary goal of rehab \"to do 3 stairs so I can get in the bathroom\"  The stairs to enter the residence have planks on them to serve as a ramp  Pt expressed that he is pleased to be in this facility, as he has had negative experiences in other rehabs out of state  Provided him with a supportive outlet & provided encouragement  Mood was appropriate for situation  Discussed role of team members & reviewed [address] with Pt & Pt's spouse, who expressed understanding & agreement  See UR section for insurance details; Pt was initially denied & needed a peer to peer to be admitted into [facility]; Pt & spouse both expressing that \"2 weeks won't be enough\"; discussed taking a day by day approach  SW will continue to monitor & assist as needed with [address]      "

## 2023-05-11 NOTE — H&P
PHYSICAL MEDICINE AND REHABILITATION H&P/ADMISSION NOTE  Natalie Frederick 59 y o  male MRN: [id number]  Unit/Bed#: -01 Encounter: [id number]     Rehab Diagnosis: Impairment of mobility, safety and Activities of Daily Living (ADLs) due to Orthopedic Disorders:  08 9  Other Orthopedic Closed fracture of medial plataeu of right tibia     History of Present Illness:   Natalie Frederick is a 59 y o  male with a PMH significant for CKD, Diabetes mellitus, GERD, HLD and HTN who presented to the [facility] on 05/01 for surgical management of a previous tibial plateau fracture  Patient seen in April for the same and opted out of surgical intervention at that time  Now, presented with varus malalignment, instability of the joint and significant comminution and bone void  He was taken to the OR with Dr Patti Gilmore on 05/01 and is s/p ORIF to the right tibial plateau  Ordered NWB status post-op for approximately 10-12 weeks with immobilizer in place for 2 weeks post-op  Post-op patient developed urinary retention  Urology consulted  Patient refused straight cath and was following urinary retention protocol to only get casey catheter insertion if needed  Nephrology consulted with CKD history, started on IV fluid resuscitation  Developed hyponatremia  Home HCTZ was held and IVF were stopped  Etiology determined to be SIADH secondary to HCTZ  Once HCTZ was stopped, sodium started to improve  Patient evaluated by PT/OT and deemed appropriate for post acute rehabilitation services  He was accepted to [facility] and arrived on 5/10/23  Plan:     Rehabilitation  • Functional deficits: impaired mobility, self care, NWB RLE, pain RLE   • Begin PT/OT/SLP  Rehabilitation goals are to achieve a modified independent level with mobility and self care  Prognosis is good  ELOS is 10-14 days  Estimated discharge is home       DVT prophylaxis  • Lovenox sq inj 40 mg daily   • SCD    Pain  • Agreeable to only Tylenol 650 mg Q8H PRN for mild pain  • Will discontinue Dilaudid 2-4 my Q6H PRN for mod-severe pain as patient does not wish to take  • Will add Robaxin 500 mg Q6H PRN for muscle spasms if patient is agreeable to take     Bladder plan  • Continent    Bowel plan  • Continent  • Last BM 5/9/23  • Continue Colace BID, Senna daily  • Milk of magnesia PRN daily     Code Status  • Full code- confirmed with patient    Skin care  · Monitor skin daily  · Apply skin nourishing cream   · Reposition Q2H to offload pressure  · Elevate heels off bed   · Monitor incision site; dressings may be changed as needed per last Ortho note  Did reach out to confirm type of dressing  · Immobilizer to remain in place x2 weeks since surgery  · Will need follow up with Orthopedics within next 1-2 weeks         Stage 3b chronic kidney disease [facility])  Assessment & Plan  Lab Results   Component Value Date    EGFR 107 05/11/2023    EGFR 106 05/09/2023    EGFR 103 05/07/2023    CREATININE 0 63 05/11/2023    CREATININE 0 64 05/09/2023    CREATININE 0 68 05/07/2023   · Monitor periodically  · Followed in acute care with Nephrology  · S/P IV Fluids  · HCTZ d/c in setting of hyponatremia   · Home Losartan held     Type 2 diabetes mellitus with diabetic peripheral angiopathy without gangrene [facility])  Assessment & Plan  Lab Results   Component Value Date    HGBA1C 7 9 (H) 04/24/2023       Recent Labs     05/10/23  1120 05/10/23  1709 05/10/23  2024 05/11/23  0743   POCGLU 218* 165* 120 150*       Blood Sugar Average: Last 72 hrs:  (P) 145   · Monitor accu cheks and adjust regimen as needed   · Continue Lantus 20 units daily HS, 15 units humalog TID AC, SSI  · Home: Lantus 30 units daily HS, Humalog 20 units TID AC  · Carb controlled diet     Acquired hypothyroidism  Assessment & Plan  · Continue Levothyroxine 150 mcg daily     Insomnia  Assessment & Plan  · Continue Ambien 10 mg HS PRN    PAD (peripheral artery disease) (HCC)  Assessment & Plan  · Continue home Plavix 75 mg daily    Hyponatremia  Assessment & Plan  · Seen by Nephrology in acute care  · Etiology determined to be SIADH secondary to HCTZ  · HCTZ since stopped   · Na has been trending upward  · Currently at 134 today     Hyperlipidemia  Assessment & Plan  · Continue Lipitor 40 mg     Gastroesophageal reflux disease without esophagitis  Assessment & Plan  · Continue Protonix 40 mg daily  · Home: Prilosec 40 mg daily    Hypertension, essential  Assessment & Plan  · Monitor vitals  · Continue Verapamil 360 mg daily HS  · Home Losartan likely held in setting of CKD     * Closed fracture of medial plateau of right tibia  Assessment & Plan  · Complex history   · Arrived 5/1 for surgical intervention of ongoing right tibial plateau fracture  · Previously in SNF but found to have varus malalignment, instability of the joint, significant comminution and bone void  · S/p ORIF on 05/01 with Dr Jacky Urbano  · NWB for 10-12 weeks  · Immobilizer to remain in place for 2 weeks at all times   · Dressings may be changed as needed; reached out to Ortho to confirm type of dressing change  · For follow up with Dr Karoline Dhillon on 5/16/23 at 1400  Will arrange for transport   · Acute chomprehensive interdisciplinary inpatient rehabilitation to include intensive skilled therapies as outlines with oversight and management by a rehabilitation Physician Assistant overseen by rehabilitation physician as well as inpatient rehabilitation nursing, case management and weekly interdisciplinary team meeting            Subjective: This is a 59year old male presenting s/p ORIF to right tibia plateau  He offers no complaints of pain at this time however nursing did note 7/10 this AM  He is refusing the Dilaudid stating it makes him feel sick  He would only like to take Tylenol and he states he will likely only take this at bedtime to help him sleep  I did offer Robaxin and will add to regimen as needed   He denies chest pain, shortness of breath, "abdominal pain, constipation or difficulty urinating  Informed patient of average length of stay however patient feels he may require additional time due to needing to get up 3 steps to enter his home  Informed patient we will evaluate and discuss further at next week's team meeting  Review of Systems   Constitutional: Negative  HENT: Negative  Respiratory: Negative  Negative for shortness of breath  Cardiovascular: Negative  Negative for chest pain  Gastrointestinal: Negative  Negative for abdominal pain and constipation  Genitourinary: Negative  Negative for difficulty urinating  Musculoskeletal: Negative  Negative for arthralgias and myalgias  Neurological: Negative  Psychiatric/Behavioral: Negative  Function:  Prior level of function and living situation:  Patient resides in a single family home with his spouse  There are 3 steps to enter the home  Can reside on one level     PTA : wife helped with set up for self care, independent with w/c for mobility, dependent with stairs and independent with cognition     1-4 reported falls within the last 6 months    He will not have 24 hour supervision/physical assistance available upon discharge    Previous DME: Commode, RW, Crutches, manual WC, single point cane    Current level of function:  Physical Therapy: bed mobility supervision, transfers not assessed, ambulation not tested  Occupational Therapy: LB dressing mod  Speech Therapy: n/a    Physical Exam:  /78 (BP Location: Right arm)   Pulse 69   Temp 97 6 °F (36 4 °C) (Temporal)   Resp 14   Ht 6' 4\" (1 93 m)   Wt 121 kg (266 lb 15 6 oz)   SpO2 94%   BMI 32 50 kg/m²        Intake/Output Summary (Last 24 hours) at 5/11/2023 1100  Last data filed at 5/11/2023 0909  Gross per 24 hour   Intake 200 ml   Output 350 ml   Net -150 ml       Body mass index is 32 5 kg/m²  Physical Exam  Vitals and nursing note reviewed     Constitutional:       General: He is not in " acute distress  Appearance: He is not ill-appearing  HENT:      Head: Normocephalic and atraumatic  Eyes:      Extraocular Movements: Extraocular movements intact  Pupils: Pupils are equal, round, and reactive to light  Cardiovascular:      Rate and Rhythm: Normal rate and regular rhythm  Pulses: Normal pulses  Heart sounds: Normal heart sounds  No murmur heard  Pulmonary:      Effort: Pulmonary effort is normal  No respiratory distress  Breath sounds: Normal breath sounds  Abdominal:      General: Bowel sounds are normal  There is no distension  Palpations: Abdomen is soft  Musculoskeletal:      Right lower leg: Edema present  Left lower leg: No edema  Comments: +2 to right foot  Previous TMA noted    Skin:     General: Skin is warm and dry  Comments: Immobilizer/dressings intact RLE   Neurological:      General: No focal deficit present  Mental Status: He is alert and oriented to person, place, and time  Psychiatric:         Mood and Affect: Mood normal          Behavior: Behavior normal             Labs, medications, and imaging personally reviewed      Laboratory:    Lab Results   Component Value Date    SODIUM 134 (L) 05/11/2023    K 3 5 05/11/2023     05/11/2023    CO2 26 05/11/2023    BUN 11 05/11/2023    CREATININE 0 63 05/11/2023    GLUC 124 05/11/2023    CALCIUM 8 2 (L) 05/11/2023     Lab Results   Component Value Date    WBC 9 37 05/11/2023    HGB 8 7 (L) 05/11/2023    HCT 27 4 (L) 05/11/2023    MCV 94 05/11/2023     (H) 05/11/2023     Lab Results   Component Value Date    INR 1 04 08/04/2020    INR 0 99 11/15/2018    PROTIME 13 8 08/04/2020    PROTIME 13 0 11/15/2018         Current Facility-Administered Medications:   •  acetaminophen (TYLENOL) tablet 650 mg, 650 mg, Oral, Q8H PRN, Maritza Cason PA-C  •  aspirin chewable tablet 81 mg, 81 mg, Oral, Daily, Maritza Cason PA-C, 81 mg at 05/11/23 0820  •  atorvastatin (LIPITOR) tablet 40 mg, 40 mg, Oral, QAM, Shayy Amin PA-C, 40 mg at 05/11/23 0820  •  clopidogrel (PLAVIX) tablet 75 mg, 75 mg, Oral, QAM, Shayy Amin PA-C, 75 mg at 05/11/23 0820  •  docusate sodium (COLACE) capsule 100 mg, 100 mg, Oral, BID, Shayy Amin PA-C, 100 mg at 05/11/23 0820  •  enoxaparin (LOVENOX) subcutaneous injection 40 mg, 40 mg, Subcutaneous, Daily, Shayy Amin PA-C, 40 mg at 05/11/23 0531  •  insulin glargine (LANTUS) subcutaneous injection 20 Units 0 2 mL, 20 Units, Subcutaneous, HS, Shayy Amin PA-C, 20 Units at 05/10/23 2102  •  insulin lispro (HumaLOG) 100 units/mL subcutaneous injection 15 Units, 15 Units, Subcutaneous, TID With Meals, Shayy Amin PA-C, 15 Units at 05/11/23 0819  •  insulin lispro (HumaLOG) 100 units/mL subcutaneous injection 2-12 Units, 2-12 Units, Subcutaneous, 4x Daily (AC & HS), 2 Units at 05/11/23 0818 **AND** Fingerstick Glucose (POCT), , , 4x Daily AC and at bedtime, Shayy Amin PA-C  •  levothyroxine tablet 150 mcg, 150 mcg, Oral, Early Morning, Shayy Amin PA-C, 150 mcg at 05/11/23 0531  •  magnesium hydroxide (MILK OF MAGNESIA) oral suspension 30 mL, 30 mL, Oral, Daily PRN, Shayy Amin PA-C  •  methocarbamol (ROBAXIN) tablet 500 mg, 500 mg, Oral, Q6H PRN, Shayy Amin PA-C  •  ondansetron (ZOFRAN-ODT) dispersible tablet 4 mg, 4 mg, Oral, Q6H PRN, Shayy Amin PA-C  •  pantoprazole (PROTONIX) EC tablet 40 mg, 40 mg, Oral, Early Morning, Shayy Amin PA-C, 40 mg at 05/11/23 0531  •  senna-docusate sodium (SENOKOT S) 8 6-50 mg per tablet 1 tablet, 1 tablet, Oral, Daily, Shayy Amin PA-C  •  tamsulosin (FLOMAX) capsule 0 4 mg, 0 4 mg, Oral, Daily With Ezio Waller PA-C, 0 4 mg at 05/10/23 1710  •  verapamil (CALAN-SR) CR tablet 360 mg, 360 mg, Oral, HS, Shayy Amin PA-C, 360 mg at 05/10/23 2102  •  zolpidem (AMBIEN) tablet 10 mg, 10 mg, Oral, HS PRN, Shayy Amin PA-C, 10 mg at 05/10/23 2102    Allergies   Allergen Reactions   • Pollen Extract Eye Swelling     Eyes get watery         Patient Active Problem List    Diagnosis Date Noted   • MANNY (obstructive sleep apnea) 05/01/2023   • Closed fracture of medial plateau of right tibia 04/13/2023   • Depression and anxiety 03/03/2023   • Acquired genu varum of right lower extremity 02/21/2023   • MARKELL (acute kidney injury) ([place] [id number] ) 02/14/2023   • Chronic kidney disease-mineral and bone disorder 02/14/2023   • Closed fracture of right tibial plateau 46/93/5205   • Stage 3b chronic kidney disease ([place] [id number] ) 11/15/2022   • Hyperkalemia 11/15/2022   • Alcoholism ([place] [address] ) 11/15/2022   • Dupuytren's contracture of right hand 06/07/2022   • Type 2 diabetes mellitus with diabetic peripheral angiopathy without gangrene ([place] [id number] ) 02/07/2022   • Physical deconditioning 12/21/2021   • Acquired hypothyroidism 10/18/2021   • Acquired absence of right foot ([place] [address] ) 03/29/2021   • Persistent proteinuria 01/12/2021   • Abnormal EKG 12/03/2020   • Preoperative examination 12/03/2020   • Insomnia 12/03/2020   • Acute blood loss anemia 10/08/2020   • Hypomagnesemia 09/09/2020   • Urinary retention 09/08/2020   • Thyroid cancer ([place] [address] ) 08/20/2020   • PAD (peripheral artery disease) ([place] [address] ) 08/05/2020   • Hyponatremia 08/04/2020   • Health maintenance examination 07/06/2020   • Elevated liver enzymes 10/24/2019   • Colon polyps 09/12/2019   • Right-sided tinnitus 11/26/2018   • Hypertension, essential 09/24/2018   • Gastroesophageal reflux disease without esophagitis 09/24/2018   • Obesity, Class I, BMI 30 0-34 9 (see actual BMI) 03/25/2010   • Hyperlipidemia 12/17/2009   • History of nonadherence to medical treatment 02/20/2008     Past Medical History:   Diagnosis Date   • Broken internal right knee prosthesis ([place] [id number] ) 01/2023   • Chronic kidney disease    • Colon polyp    • Constipation 03/09/2023   • Diabetes mellitus ([place] [id number] )    • Disease of thyroid gland    • GERD (gastroesophageal reflux disease)    • HHS vs DKA 11/16/2018   • Hyperlipidemia    • Hypertension    • Thyroid cancer [facility])      Past Surgical History:   Procedure Laterality Date   • COLONOSCOPY     • FOOT AMPUTATION Right    • FOOT SURGERY Right 2014   • IR AORTAGRAM WITH RUN-OFF  08/06/2020   • IR TUNNELED CENTRAL LINE PLACEMENT  09/08/2020   • IR TUNNELED CENTRAL LINE REMOVAL  09/28/2020   • PA AMPUTATION FOOT TRANSMETARSAL Right 09/03/2020    Procedure: AMPUTATION TRANSMETATARSAL (TMA);  Surgeon: Med Richardson DPM;  Location: [facility];  Service: Podiatry   • PA OPEN [place] UNICONDYLAR Right 5/1/2023    Procedure: OPEN REDUCTION W/ INTERNAL FIXATION (ORIF) RIGHT TIBIAL PLATEAU NONUNION;  Surgeon: Anurag Graham MD;  Location: [facility];  Service: Orthopedics   • PA THYROIDECTOMY TOTAL/COMPLETE N/A 02/03/2021    Procedure: TOTAL THYROIDECTOMY WITH NIMS MONITORING;  Surgeon: Rita Gutierrez MD;  Location:  MAIN OR;  Service: ENT   • TOE AMPUTATION Right 08/11/2020    Procedure: AMPUTATION TOE;  Surgeon: Med Rcihardson DPM;  Location: [facility];  Service: Podiatry   • US GUIDED THYROID BIOPSY  07/02/2020     Social History     Socioeconomic History   • Marital status: Single     Spouse name: Not on file   • Number of children: Not on file   • Years of education: Not on file   • Highest education level: Not on file   Occupational History   • Not on file   Tobacco Use   • Smoking status: Never   • Smokeless tobacco: Never   Vaping Use   • Vaping Use: Never used   Substance and Sexual Activity   • Alcohol use: Not Currently     Comment: quit 1-2023   • Drug use: Not Currently     Types: Marijuana   • Sexual activity: Not Currently     Partners: Female   Other Topics Concern   • Not on file   Social History Narrative    Light caffeine     Wears seatbelt daily    Lives with girlfriend     Social Determinants of Health     Financial Resource Strain: Not on file   Food Insecurity: No Food Insecurity   • Worried About [address] in the Last Year: Never true   • Ran Out of Food in the Last Year: Never true   Transportation Needs: No Transportation Needs   • Lack of Transportation (Medical): No   • Lack of Transportation (Non-Medical): No   Physical Activity: Not on file   Stress: Not on file   Social Connections: Not on file   Intimate Partner Violence: Not on file   Housing Stability: Low Risk    • Unable to Pay for Housing in the Last Year: No   • Number of Places Lived in the Last Year: 1   • Unstable Housing in the Last Year: No     Social History     Tobacco Use   Smoking Status Never   Smokeless Tobacco Never     Social History     Substance and Sexual Activity   Alcohol Use Not Currently    Comment: quit 1-2023     Family History   Problem Relation Age of Onset   • Cancer Mother    • Hypertension Father          Medical Necessity Criteria for [facility] Admission: Electrolyte imbalance:  hyponatremia, Chronic Kidney Disease, Anemia, with the following plan: monitor H/H, Hypertension, Bowel/Bladder Management, Diabetes requiring close blood glucose monitoring and Incision/Wound care  In addition, the preadmission screen, post-admission physical evaluation, overall plan of care and admissions order demonstrate a reasonable expectation that the following criteria were met at the time of admission to the [facility]  1  The patient requires active and ongoing therapeutic intervention of multiple therapy disciplines (physical therapy, occupational therapy, speech-language pathology, or prosthetics/orthotics), one of which is physical or occupational therapy  2  Patient requires an intensive rehabilitation therapy program, as defined in Chapter 1, section 110 2 2 of the CMS Medicare Policy Manual  This intensive rehabilitation therapy program will consist of at least 3 hours of therapy per day at least 5 days per week or at least 15 hours of intensive rehabilitation therapy within a 7 consecutive day period, beginning with the date of admission to the [facility]      3  The patient is reasonably expected to actively participate in, and benefit significantly from, the intensive rehabilitation therapy program as defined in Chapter 1, section 110 2 2 of the CMS Medicare Policy Manual at this time of admission to the [facility]  He can reasonably be expected to make measurable improvement (that will be of practical value to improve the patient’s functional capacity or adaptation to impairments) as a result of the rehabilitation treatment, as defined in section 110 3, and such improvement can be expected to be made within the prescribed period of time  As noted in the CMS Medicare Policy Manual, the patient need not be expected to achieve complete independence in the domain of self-care nor be expected to return to his or her prior level of functioning in order to meet this standard  4  The patient must require physician supervision by a rehabilitation physician  As such, a rehabilitation physician will conduct face-to-face visits with the patient at least 3 days per week throughout the patient’s stay in the [facility] to assess the patient both medically and functionally, as well as to modify the course of treatment as needed to maximize the patient’s capacity to benefit from the rehabilitation process  5  The patient requires an intensive and coordinated interdisciplinary approach to providing rehabilitation, as defined in Chapter 1, section 110 2 5 of the CMS Medicare Policy Manual  This will be achieved through periodic team conferences, conducted at least once in a 7-day period, and comprising of an interdisciplinary team of medical professionals consisting of: a rehabilitation physician, registered nurse,  and/or , and a licensed/certified therapist from each therapy discipline involved in treating the patient  Changes Since Pre-admission Assessment: None -This patient's participation in rehab continues to be reasonable, necessary and appropriate      CMS Required Post-Admission Physician Evaluation Elements  History and Physical, including medical history, functional history and active comorbidities as in above text  Post-Admission Physician Evaluation:  The patient has the potential to make improvement and is in need of physical, occupational, and/or therapy services  The patient may also need nutritional services  Given the patient's complex medical condition and risk of further medical complications, rehabilitative services cannot be safely provided at a lower level of care, such as a skilled nursing facility  I have reviewed the patient's functional and medical status at the time of the preadmission screening and they are the same as on the day of this admission  I acknowledge that I have personally performed a full physical examination on this patient within 24 hours of admission  The patient and/or family demonstrated understanding the rehabilitation program and the discharge process after we discussed them  Agree in entirety: yes  Minor adaptions: none    Major changes: none    Lee Ann José PA-C    ** Please Note: Fluency Direct voice to text software may have been used in the creation of this document  **      I have spent a total time of 75 minutes on 05/11/23 in caring for this patient including Diagnostic results, Prognosis, Risks and benefits of tx options, Instructions for management, Patient and family education, Importance of tx compliance, Risk factor reductions, Impressions, Counseling / Coordination of care, Documenting in the medical record, Reviewing / ordering tests, medicine, procedures  , Obtaining or reviewing history   and Communicating with other healthcare professionals

## 2023-05-11 NOTE — ASSESSMENT & PLAN NOTE
· Levothyroxine 150 mcg daily increased to 175 mcg in setting of elevated TSH  · 11/14- 35 400, 5/15- 26 562  · Will need repeat TSH at next PCP follow up

## 2023-05-11 NOTE — NURSING NOTE
Rates pain right leg 7 pain scale with movement  Refuses need for pain med or ice  DDI and immobilizer maintained to right leg, ABD placed posterior upper portion of immobilizer for pt comfort  No s/s pressure noted  States of passing gas, refuses to get OOB to commode , also refused to get OOB to chair for dinner  Call bell within reach  Safety maintained at all times  Self administration of insulin after drawn up by staff  Performed well  Pt states he has an insulin pen and injects his own insulin at home  Continue same plan of care

## 2023-05-11 NOTE — CASE MANAGEMENT
GABRIEL notified this worker that Pt has a post-op with ortho for 5/16 that should be kept as an in-person visit due to the complex nature of Pt's case   Team recommends litter transport; litter transport arranged with [facility] for 5/16; 12:45  for 2 PM appt, see AVS

## 2023-05-11 NOTE — NURSING NOTE
Pt requesting bed extender  Charge nurse aware and notified [facility] nursing supervisor  Will pass on to next shift

## 2023-05-12 LAB
GLUCOSE SERPL-MCNC: 134 MG/DL (ref 65–140)
GLUCOSE SERPL-MCNC: 152 MG/DL (ref 65–140)
GLUCOSE SERPL-MCNC: 155 MG/DL (ref 65–140)
GLUCOSE SERPL-MCNC: 178 MG/DL (ref 65–140)

## 2023-05-12 PROCEDURE — 97542 WHEELCHAIR MNGMENT TRAINING: CPT | Performed by: PHYSICAL THERAPIST

## 2023-05-12 PROCEDURE — 97110 THERAPEUTIC EXERCISES: CPT | Performed by: PHYSICAL THERAPIST

## 2023-05-12 PROCEDURE — 97110 THERAPEUTIC EXERCISES: CPT

## 2023-05-12 PROCEDURE — 82948 REAGENT STRIP/BLOOD GLUCOSE: CPT

## 2023-05-12 PROCEDURE — 97535 SELF CARE MNGMENT TRAINING: CPT

## 2023-05-12 PROCEDURE — 99232 SBSQ HOSP IP/OBS MODERATE 35: CPT | Performed by: PHYSICAL MEDICINE & REHABILITATION

## 2023-05-12 PROCEDURE — 97530 THERAPEUTIC ACTIVITIES: CPT | Performed by: PHYSICAL THERAPIST

## 2023-05-12 PROCEDURE — 97530 THERAPEUTIC ACTIVITIES: CPT

## 2023-05-12 RX ORDER — FERROUS SULFATE 325(65) MG
325 TABLET ORAL
Status: DISCONTINUED | OUTPATIENT
Start: 2023-05-13 | End: 2023-05-22

## 2023-05-12 RX ADMIN — ZOLPIDEM TARTRATE 10 MG: 5 TABLET ORAL at 21:03

## 2023-05-12 RX ADMIN — ACETAMINOPHEN 650 MG: 325 TABLET ORAL at 10:10

## 2023-05-12 RX ADMIN — CLOPIDOGREL BISULFATE 75 MG: 75 TABLET ORAL at 08:11

## 2023-05-12 RX ADMIN — ENOXAPARIN SODIUM 40 MG: 40 INJECTION SUBCUTANEOUS at 06:01

## 2023-05-12 RX ADMIN — INSULIN LISPRO 2 UNITS: 100 INJECTION, SOLUTION INTRAVENOUS; SUBCUTANEOUS at 08:13

## 2023-05-12 RX ADMIN — INSULIN LISPRO 15 UNITS: 100 INJECTION, SOLUTION INTRAVENOUS; SUBCUTANEOUS at 16:53

## 2023-05-12 RX ADMIN — INSULIN LISPRO 15 UNITS: 100 INJECTION, SOLUTION INTRAVENOUS; SUBCUTANEOUS at 08:13

## 2023-05-12 RX ADMIN — INSULIN LISPRO 15 UNITS: 100 INJECTION, SOLUTION INTRAVENOUS; SUBCUTANEOUS at 12:10

## 2023-05-12 RX ADMIN — TAMSULOSIN HYDROCHLORIDE 0.4 MG: 0.4 CAPSULE ORAL at 16:52

## 2023-05-12 RX ADMIN — INSULIN LISPRO 2 UNITS: 100 INJECTION, SOLUTION INTRAVENOUS; SUBCUTANEOUS at 12:11

## 2023-05-12 RX ADMIN — INSULIN LISPRO 2 UNITS: 100 INJECTION, SOLUTION INTRAVENOUS; SUBCUTANEOUS at 21:03

## 2023-05-12 RX ADMIN — INSULIN GLARGINE 20 UNITS: 100 INJECTION, SOLUTION SUBCUTANEOUS at 21:03

## 2023-05-12 RX ADMIN — ATORVASTATIN CALCIUM 40 MG: 40 TABLET, FILM COATED ORAL at 08:11

## 2023-05-12 RX ADMIN — ASPIRIN 81 MG 81 MG: 81 TABLET ORAL at 08:11

## 2023-05-12 RX ADMIN — LEVOTHYROXINE SODIUM 150 MCG: 75 TABLET ORAL at 06:02

## 2023-05-12 RX ADMIN — PANTOPRAZOLE SODIUM 40 MG: 40 TABLET, DELAYED RELEASE ORAL at 06:02

## 2023-05-12 RX ADMIN — ACETAMINOPHEN 650 MG: 325 TABLET ORAL at 21:03

## 2023-05-12 NOTE — DISCHARGE INSTR - OTHER ORDERS
1  Offloading cushion to chair/wheelchair when OOB  2  Elevate heels off of bed with pillows to offload  3  Apply skin nourishing cream to the skin daily  4  Turn and reposition patient Q2 hours in bed, more frequently while in the chair/wheelchair  5  Cleanse wound beds to left anterior shin and left plantar foot with NSS, pat dry  Apply Maxorb ag to the plantar foot wound and cover with bordered gauze  Change dressing daily and PRN  6  Right leg wound treatment : Dressing changes once to twice a day or as needed with soilage or dislodgement  Sutures to remain intact until next follow up Ortho appointment  If drainage stops, can decrease amount of dressing changes  If no soilage for 2-3 days in a row, may leave incision site open to air  May wash with soap/water  Ok for AROM, hold PROM until cleared by Ortho

## 2023-05-12 NOTE — PLAN OF CARE
Problem: PAIN - ADULT  Goal: Verbalizes/displays adequate comfort level or baseline comfort level  Description: Interventions:  - Encourage patient to monitor pain and request assistance  - Assess pain using appropriate pain scale  - Administer analgesics based on type and severity of pain and evaluate response  - Implement non-pharmacological measures as appropriate and evaluate response  - Consider cultural and social influences on pain and pain management  - Notify physician/advanced practitioner if interventions unsuccessful or patient reports new pain  Outcome: Progressing     Problem: INFECTION - ADULT  Goal: Absence or prevention of progression during hospitalization  Description: INTERVENTIONS:  - Assess and monitor for signs and symptoms of infection  - Monitor lab/diagnostic results  - Monitor all insertion sites, i e  indwelling lines, tubes, and drains  - Monitor endotracheal if appropriate and nasal secretions for changes in amount and color  - [place] appropriate cooling/warming therapies per order  - Administer medications as ordered  - Instruct and encourage patient and family to use good hand hygiene technique  - Identify and instruct in appropriate isolation precautions for identified infection/condition  Outcome: Progressing

## 2023-05-12 NOTE — WOUND OSTOMY CARE
Consult Note - Wound   Maria Led 59 y o  male MRN: [id number]  Unit/Bed#: -01 Encounter: [id number]    History and Present Illness:  59year old male patient admitted to [facility] from [facility] s/p ORIF to right tibia plateau  Wound care consulted for left anterior pretibial wound  Per patient the wound has been present since January since he had a fall at that time  Patient history significant for HTN, PAD, DDM2, thyroid CA, urinary retention, Dupuytren's contracture of right hand, alcoholism, depression and anxiety  Assessment:  Patient OOB in the wheelchair with PT, patient is going to the shower  He has an immobilizer in place to the right leg, ace wraps visible beneath  He is awake, alert and oriented  He is not incontinent  Per RN, he repositions himself in bed  He is able to feed himself and is a mod assist with hygiene care  He is NWB to the right leg  1  POA-chronic traumatic wound to the left anterior pretibial wound  Per patient this wound has been present since he had a fall in January  Wound bed is partial thickness, beefy red-pink, no drainage, no erythema, scaly yellow skin to the distal edge of the wound, scarring noted to the periwound  Dermagran and bordered gauze to be applied by RN after patient shower  2  POA-intact yellow-brown eschar to the left plantar mid-foot  Edges are scaly and lifted, no open wound bed, no drainage, no erythema or edema  Betadine and bordered gauze to be applied by RN after patient shower    Skin and Wound Care Plan:   1  Offloading cushion to chair/wheelchair when OOB  2  Elevate heels off of bed with pillows to offload  3  Apply skin nourishing cream to the skin daily  4  Turn and reposition patient Q2 hours in bed, more frequently while in the chair/wheelchair  5  Cleanse wound beds to left anterior shin and left plantar foot with NSS, pat dry  Apply betadine to the plantar foot wound and cover with bordered gauze   Place Dermagran on the left shin wound and cover with bordered gauze  Change dressings daily and PRN  6  Right leg wound treatment per surgery orders    Wounds:      Wound 05/08/23 Pretibial Left (Active)   Wound Image   05/12/23 0926   Wound Description Beefy red;Brown 05/12/23 0926   Ana-wound Assessment Fragile;Pink;Pale 05/12/23 0926   Wound Length (cm) 5 5 cm 05/12/23 0926   Wound Width (cm) 0 9 cm 05/12/23 0926   Wound Depth (cm) 0 1 cm 05/12/23 0926   Wound Surface Area (cm^2) 4 95 cm^2 05/12/23 0926   Wound Volume (cm^3) 0 495 cm^3 05/12/23 0926   Calculated Wound Volume (cm^3) 0 5 cm^3 05/12/23 0926   Drainage Amount None 05/12/23 0926   Non-staged Wound Description Partial thickness 05/12/23 0926   Treatments Site care 05/12/23 0745   Dressing Dermagran gauze;Gauze 05/12/23 0926   Wound packed? No 05/10/23 0843   Dressing Changed New 05/12/23 0926   Patient Tolerance Tolerated well 05/12/23 0926   Dressing Status Clean;Dry; Intact 05/12/23 0745       Wound 05/10/23 Diabetic Ulcer Other (Comment) Foot Left (Active)   Wound Image   05/12/23 0927   Wound Description Brown;Eschar 05/12/23 0927   Ana-wound Assessment Intact;Dry 05/12/23 0927   Wound Length (cm) 1 6 cm 05/12/23 0927   Wound Width (cm) 1 6 cm 05/12/23 0927   Wound Depth (cm) 0 cm 05/12/23 0927   Wound Surface Area (cm^2) 2 56 cm^2 05/12/23 0927   Wound Volume (cm^3) 0 cm^3 05/12/23 0927   Calculated Wound Volume (cm^3) 0 cm^3 05/12/23 0927   Drainage Amount None 05/12/23 0927   Treatments Other (Comment) 05/12/23 0927   Dressing Gauze 05/12/23 0927   Patient Tolerance Tolerated well 05/12/23 9623     Reviewed plan of care with primary RN Deisy Epps  Recommendations written as orders  Wound care team to follow weekly while admitted  Questions or concerns [address] Nurse    Katja REYNAGAN, RN, [place]

## 2023-05-12 NOTE — PROGRESS NOTES
PM&R PROGRESS NOTE:  Marion Velásquez 59 y o  male MRN: [id number]  Unit/Bed#: -01 Encounter: [id number]        Rehabilitation Diagnosis: Impairment of mobility, safety and Activities of Daily Living (ADLs) due to Orthopedic Disorders:  08 9  Other Orthopedic Closed fracture of medial plataeu of right tibia    HPI: Marion Velásquez is a 59 y o  male with a PMH significant for CKD, Diabetes mellitus, GERD, HLD and HTN who presented to the [facility] on 05/01 for surgical management of a previous tibial plateau fracture  Patient seen in April for the same and opted out of surgical intervention at that time  Now, presented with varus malalignment, instability of the joint and significant comminution and bone void  He was taken to the OR with Dr Sepideh Carr on 05/01 and is s/p ORIF to the right tibial plateau  Ordered NWB status post-op for approximately 10-12 weeks with immobilizer in place for 2 weeks post-op  Post-op patient developed urinary retention  Urology consulted  Patient refused straight cath and was following urinary retention protocol to only get casey catheter insertion if needed  Nephrology consulted with CKD history, started on IV fluid resuscitation  Developed hyponatremia  Home HCTZ was held and IVF were stopped  Etiology determined to be SIADH secondary to HCTZ  Once HCTZ was stopped, sodium started to improve  Patient evaluated by PT/OT and deemed appropriate for post acute rehabilitation services  He was accepted to [facility] and arrived on 5/10/23  SUBJECTIVE: Patient seen and evaluated  States the pain is tolerable with Tylenol and Ambien combination at nighttime  Noted to have chronic wound to left shin, pre-tibial and diabetic foot wound to left heel  Wound care nursing in to evaluate and recommendations appreciated  Patient denies chest pain, shortness of breath, abdominal pain   Refused stool softeners this AM  Did discontinue colace BID and left Senna daily HS which patient is agreeable to take later  He is requesting a bed extender which I agree  Current bed is too short compared to his height  Nursing working on obtaining this and in the meantime, can remove end of bed if needed  ASSESSMENT: Stable, progressing      PLAN:    Rehabilitation  • Functional deficits: impaired mobility, self care, NWB RLE, pain RLE    • Continue current rehabilitation plan of care to maximize function  • Functional update:   o PT:  Roll L-R: Supervision  Sit-lying: cg   Lying to sitting on side of bed: supervision    o OT:  Oral Hygiene: giraldo cu   Showering/bathing: cg  UB dressing: giraldo cu   Putting on/taking off footwear: mod  toileting hygiene: giraldo cu    • Estimated Discharge: To be discussed in next week's meeting      Pain  • Tylenol 650 mg Q8H PRN for mild pain  • Robaxin 500 mg Q6H PRN for muscle spasms     DVT prophylaxis  • Lovenox sq inj 40 mg daily  • Refusing to wear SCDs     Bladder plan  • Continent    Bowel plan  • Continent  • Last BM 5/11/23  • Refusing stool softeners today  • Continue Senna daily HS, d/c Colace     Skin care  · Monitor skin daily  · Apply skin nourishing cream  · Reposition Q2H to offload pressure  · Elevate heels off bed  · Monitor incision site  · Dressings to be changed PRN with soilage and after showering   Apply ABD and ACE wrap   · Immobilizer to remain in place 2 weeks post op     Stage 3b chronic kidney disease [facility])  Assessment & Plan  Lab Results   Component Value Date    EGFR 107 05/11/2023    EGFR 106 05/09/2023    EGFR 103 05/07/2023    CREATININE 0 63 05/11/2023    CREATININE 0 64 05/09/2023    CREATININE 0 68 05/07/2023   · Monitor periodically  · Followed in acute care with Nephrology  · S/P IV Fluids  · HCTZ d/c in setting of hyponatremia     Type 2 diabetes mellitus with diabetic peripheral angiopathy without gangrene [facility])  Assessment & Plan  Lab Results   Component Value Date    HGBA1C 7 9 (H) 04/24/2023       Recent Labs 05/11/23  1628 05/11/23  1714 05/11/23 2010 05/12/23  0756   POCGLU 84 135 211* 178*       Blood Sugar Average: Last 72 hrs:  (P) 154   · Monitor accu cheks and adjust regimen as needed   · Continue Lantus 20 units daily HS, 15 units humalog TID AC, SSI  · Home: Lantus 30 units daily HS, Humalog 20 units TID AC  · Carb controlled diet     Acquired hypothyroidism  Assessment & Plan  · Continue Levothyroxine 150 mcg daily     Insomnia  Assessment & Plan  · Continue Ambien 10 mg HS PRN    PAD (peripheral artery disease) ([facility])  Assessment & Plan  · Continue home Plavix 75 mg daily    Hyponatremia  Assessment & Plan  · Seen by Nephrology in acute care  · Etiology determined to be SIADH secondary to HCTZ  · HCTZ since stopped   · 134 on 5/11   · Will repeat in 1 week    Hyperlipidemia  Assessment & Plan  · Continue Lipitor 40 mg     Gastroesophageal reflux disease without esophagitis  Assessment & Plan  · Continue Protonix 40 mg daily  · Home: Prilosec 40 mg daily    Hypertension, essential  Assessment & Plan  · Monitor vitals  · Continue Verapamil 360 mg daily HS    * Closed fracture of medial plateau of right tibia  Assessment & Plan  · Complex history   · Arrived 5/1 for surgical intervention of ongoing right tibial plateau fracture  · Previously in SNF but found to have varus malalignment, instability of the joint, significant comminution and bone void  · S/p ORIF on 05/01 with Dr Savanna Hodge  · NWB for 10-12 weeks  · Immobilizer to remain in place for 2 weeks at all times   · Dressings may be changed as needed; reached out to Ortho to confirm type of dressing change  · For follow up with Dr Aida Hernandez on 5/16/23 at 1400   Transport arranged for 1240   · Acute chomprehensive interdisciplinary inpatient rehabilitation to include intensive skilled therapies as outlines with oversight and management by a rehabilitation Physician Assistant overseen by rehabilitation physician as well as inpatient rehabilitation nursing, case "management and weekly interdisciplinary team meeting            Appreciate IM consultants medical co-management  Labs, medications, and imaging personally reviewed  ROS:  A ten point review of systems was completed and pertinent positives are listed in subjective section  All other systems reviewed were negative  Review of Systems   Constitutional: Negative  HENT: Negative  Respiratory: Negative  Negative for shortness of breath  Cardiovascular: Negative  Negative for chest pain  Gastrointestinal: Negative  Negative for abdominal pain and constipation  Genitourinary: Negative  Negative for difficulty urinating  Musculoskeletal: Positive for myalgias  RLE - tolerable    Neurological: Negative  Psychiatric/Behavioral: Negative  OBJECTIVE:   /64 (BP Location: Right arm)   Pulse 64   Temp 97 7 °F (36 5 °C) (Temporal)   Resp 14   Ht 6' 4\" (1 93 m)   Wt 121 kg (266 lb 15 6 oz)   SpO2 96%   BMI 32 50 kg/m²     Physical Exam  Vitals reviewed  Constitutional:       General: He is not in acute distress  Appearance: He is not ill-appearing  HENT:      Head: Normocephalic and atraumatic  Eyes:      Pupils: Pupils are equal, round, and reactive to light  Cardiovascular:      Rate and Rhythm: Normal rate and regular rhythm  Pulses: Normal pulses  Heart sounds: Normal heart sounds  No murmur heard  Pulmonary:      Effort: Pulmonary effort is normal  No respiratory distress  Breath sounds: Normal breath sounds  Abdominal:      General: Bowel sounds are normal  There is no distension  Palpations: Abdomen is soft  Musculoskeletal:      Right lower leg: No edema  Left lower leg: No edema  Skin:     General: Skin is warm and dry  Comments: Left pre-tibial chronic wound  Pink with two open penelope   Inferior wound appears to be scabbing over, superior with beefy red skin formation  Left heel- diabetic foot wound, yellow tough skin  " Neurological:      General: No focal deficit present  Mental Status: He is alert and oriented to person, place, and time     Psychiatric:         Mood and Affect: Mood normal          Behavior: Behavior normal           Lab Results   Component Value Date    WBC 9 37 05/11/2023    HGB 8 7 (L) 05/11/2023    HCT 27 4 (L) 05/11/2023    MCV 94 05/11/2023     (H) 05/11/2023     Lab Results   Component Value Date    SODIUM 134 (L) 05/11/2023    K 3 5 05/11/2023     05/11/2023    CO2 26 05/11/2023    BUN 11 05/11/2023    CREATININE 0 63 05/11/2023    GLUC 124 05/11/2023    CALCIUM 8 2 (L) 05/11/2023     Lab Results   Component Value Date    INR 1 04 08/04/2020    INR 0 99 11/15/2018    PROTIME 13 8 08/04/2020    PROTIME 13 0 11/15/2018           Current Facility-Administered Medications:   •  acetaminophen (TYLENOL) tablet 650 mg, 650 mg, Oral, Q8H PRN, Loretha Ringer, PA-C, 650 mg at 05/11/23 2118  •  aspirin chewable tablet 81 mg, 81 mg, Oral, Daily, Margoth Guerrier PA-C, 81 mg at 05/12/23 0534  •  atorvastatin (LIPITOR) tablet 40 mg, 40 mg, Oral, QAM, Loretha Ringer, PA-C, 40 mg at 05/12/23 1573  •  clopidogrel (PLAVIX) tablet 75 mg, 75 mg, Oral, QAM, Loretha Ringer, PA-C, 75 mg at 05/12/23 7844  •  enoxaparin (LOVENOX) subcutaneous injection 40 mg, 40 mg, Subcutaneous, Daily, Loretha Ringer, PA-C, 40 mg at 05/12/23 0601  •  insulin glargine (LANTUS) subcutaneous injection 20 Units 0 2 mL, 20 Units, Subcutaneous, HS, Loretha Marco PA-C, 20 Units at 05/11/23 2111  •  insulin lispro (HumaLOG) 100 units/mL subcutaneous injection 15 Units, 15 Units, Subcutaneous, TID With Meals, Loretha Marco, PA-C, 15 Units at 05/12/23 0813  •  insulin lispro (HumaLOG) 100 units/mL subcutaneous injection 2-12 Units, 2-12 Units, Subcutaneous, 4x Daily (AC & HS), 2 Units at 05/12/23 0813 **AND** Fingerstick Glucose (POCT), , , 4x Daily AC and at bedtime, Ellyn Lara PA-C  •  levothyroxine tablet 150 mcg, 150 mcg, Oral, Early Morning, Ijeoma Acevedo PA-C, 150 mcg at 05/12/23 0602  •  magnesium hydroxide (MILK OF MAGNESIA) oral suspension 30 mL, 30 mL, Oral, Daily PRN, Ijeoma Acevedo PA-C  •  methocarbamol (ROBAXIN) tablet 500 mg, 500 mg, Oral, Q6H PRN, Ijeoma Acevedo PA-C  •  ondansetron (ZOFRAN-ODT) dispersible tablet 4 mg, 4 mg, Oral, Q6H PRN, Ijeoma Acevedo PA-C  •  pantoprazole (PROTONIX) EC tablet 40 mg, 40 mg, Oral, Early Morning, Ijeoma Acevedo PA-C, 40 mg at 05/12/23 0602  •  senna-docusate sodium (SENOKOT S) 8 6-50 mg per tablet 1 tablet, 1 tablet, Oral, Daily, Ijeoma Acevedo PA-C  •  tamsulosin [facility]) capsule 0 4 mg, 0 4 mg, Oral, Daily With Olethtigre Restrepo PA-C, 0 4 mg at 05/11/23 1653  •  verapamil (CALAN-SR) CR tablet 360 mg, 360 mg, Oral, HS, Ijeoma Acevedo PA-C, 360 mg at 05/11/23 2111  •  zolpidem (AMBIEN) tablet 10 mg, 10 mg, Oral, HS PRN, Ijeoma Acevedo PA-C, 10 mg at 05/11/23 2115    Past Medical History:   Diagnosis Date   • Broken internal right knee prosthesis ([facility] ) 01/2023   • Chronic kidney disease    • Colon polyp    • Constipation 03/09/2023   • Diabetes mellitus ([facility] )    • Disease of thyroid gland    • GERD (gastroesophageal reflux disease)    • HHS vs DKA 11/16/2018   • Hyperlipidemia    • Hypertension    • Thyroid cancer [facility])        Patient Active Problem List    Diagnosis Date Noted   • MANNY (obstructive sleep apnea) 05/01/2023   • Closed fracture of medial plateau of right tibia 04/13/2023   • Depression and anxiety 03/03/2023   • Acquired genu varum of right lower extremity 02/21/2023   • MARKELL (acute kidney injury) ([facility] ) 02/14/2023   • Chronic kidney disease-mineral and bone disorder 02/14/2023   • Closed fracture of right tibial plateau 17/83/3552   • Stage 3b chronic kidney disease ([facility] ) 11/15/2022   • Hyperkalemia 11/15/2022   • Alcoholism ([place] [address] ) 11/15/2022   • Dupuytren's contracture of right hand 06/07/2022   • Type 2 diabetes mellitus with diabetic peripheral angiopathy without gangrene ([place] [address] ) 02/07/2022   • Physical deconditioning 12/21/2021   • Acquired hypothyroidism 10/18/2021   • Acquired absence of right foot ([facility] ) 03/29/2021   • Persistent proteinuria 01/12/2021   • Abnormal EKG 12/03/2020   • Preoperative examination 12/03/2020   • Insomnia 12/03/2020   • Acute blood loss anemia 10/08/2020   • Hypomagnesemia 09/09/2020   • Urinary retention 09/08/2020   • Thyroid cancer ([place] [address] ) 08/20/2020   • PAD (peripheral artery disease) ([place] [address] ) 08/05/2020   • Hyponatremia 08/04/2020   • Health maintenance examination 07/06/2020   • Elevated liver enzymes 10/24/2019   • Colon polyps 09/12/2019   • Right-sided tinnitus 11/26/2018   • Hypertension, essential 09/24/2018   • Gastroesophageal reflux disease without esophagitis 09/24/2018   • Obesity, Class I, BMI 30 0-34 9 (see actual BMI) 03/25/2010   • Hyperlipidemia 12/17/2009   • History of nonadherence to medical treatment 02/20/2008          Iza Conrad PA-C    I have spent a total time of 35 minutes on 05/12/23 in caring for this patient including Instructions for management, Patient and family education, Impressions, Counseling / Coordination of care, Documenting in the medical record, Reviewing / ordering tests, medicine, procedures  , Obtaining or reviewing history   and Communicating with other healthcare professionals   ** Please Note:  voice to text software may have been used in the creation of this document   Although proof errors in transcription or interpretation are a potential of such software**

## 2023-05-12 NOTE — PROGRESS NOTES
05/12/23 1300   Pain Assessment   Pain Assessment Tool 0-10   Pain Score 5   Pain Location/Orientation Orientation: Right;Location: Knee;Orientation: Bilateral;Location: Shoulder   Restrictions/Precautions   Precautions Bed/chair alarms; Fall Risk   RLE Weight Bearing Per Order NWB   ROM Restrictions   (RLE immobilizers at all time)   Braces or Orthoses Knee immobilizer  (RLE)   Sit to Lying   Type of Assistance Needed Supervision   Physical Assistance Level No physical assistance   Sit to Lying CARE Score 4   Lying to Sitting on Side of Bed   Type of Assistance Needed Supervision   Physical Assistance Level No physical assistance   Lying to Sitting on Side of Bed CARE Score 4   Toilet Transfer   Findings pt and OT discussed potential to transfer onto toilet in bathroom, OT explained to sit pivot from toilet positioned diagonally at toilet using grab bars to assist, pt reported feeling confident in ability to complete this transfer but did not wish to practice during this session   Exercise Tools   Other Exercise Tool 1 UE strengthening in bilat UE using 3# dumbbell, 2x15 or 3x10: elbow flexion/extension, protraction/retraction, internal/external rotation, pronation/supination, shoulder flexion/extension, shoulder elevation/depression   Cognition   Overall Cognitive Status [facility]   Arousal/Participation Alert; Responsive; Cooperative   Attention Within functional limits   Orientation Level Oriented X4   Memory Within functional limits   Following Commands Follows all commands and directions without difficulty   Assessment   Treatment Assessment Pt seen for brief session this PM focusing on UE strengthening and fxl tranfsers  Pt sat EOB for part of UE strengthening and completed activities with good tolerance and rest breaks taken as needed  Pt reported soreness in bilat shoulders during exercises   Pt reported interest in transferring to toilet in bathroom next time the need arises but did not want to practice during this session; OT instead discussed transfer set up with pt, who was receptive  Pt's wife present during session  Pt continues with barriers to d/c of decreased strength throughout but especially RLE s/p tibia fx, decreased balance, WNB RLE with knee immobilizer, and decreased activity tolerance; all affect independence in self care and fxl transfers  Pt would benefit from continued skilled OT services in order to address listed barriers and prepare for safe d/c  Prognosis Good   Problem List Decreased strength;Decreased range of motion;Decreased endurance; Impaired balance;Orthopedic restrictions;Pain;Decreased safety awareness   Plan   Treatment/Interventions ADL retraining;Functional transfer training;LE strengthening/ROM; Therapeutic exercise; Endurance training;Patient/family training;Equipment eval/education; Compensatory technique education;OT   Progress Progressing toward goals   OT Therapy Minutes   OT Time In 1300   OT Time Out 1339   OT Total Time (minutes) 39   OT Mode of treatment - Individual (minutes) 39

## 2023-05-12 NOTE — CONSULTS
Consultation - Internal Medicine    Meagan Preston 59 y o  male MRN: [id number]  Unit/Bed#: [facility] 211-01 Encounter: [id number]      A/P:  1  Hypertension: controlled on verapamil 360, continue to monitor and adjust as needed   2  Diabetes Mellitus: insulin requiring; continue lantus and humalog monitoring readings closely   3  Hypothyroidism s/p complete thyroidectomy; positive malignancy: continue levothyroxine at 150mcg   4  Tibial plateau fracture: requiring ORIF, now requiring in patient therapy, continue to manage pain effectively   5  CKD: continue to monitor with periodic labs; stable at ckd 3           Thank you for allowing us to participate in the care of your patient  Please feel free to contact us regarding the care of this patient, or any other questions/concerns that may be applicable      Patient Active Problem List   Diagnosis   • Hypertension, essential   • Gastroesophageal reflux disease without esophagitis   • Hyperlipidemia   • History of nonadherence to medical treatment   • Right-sided tinnitus   • Colon polyps   • Elevated liver enzymes   • Health maintenance examination   • Hyponatremia   • PAD (peripheral artery disease) (HCC)   • Thyroid cancer (HCC)   • Urinary retention   • Hypomagnesemia   • Acute blood loss anemia   • Abnormal EKG   • Preoperative examination   • Insomnia   • Persistent proteinuria   • Acquired absence of right foot (HCC)   • Acquired hypothyroidism   • Physical deconditioning   • Type 2 diabetes mellitus with diabetic peripheral angiopathy without gangrene ([address] )   • Dupuytren's contracture of right hand   • Stage 3b chronic kidney disease (HCC)   • Hyperkalemia   • Alcoholism ([address] )   • Closed fracture of right tibial plateau   • Obesity, Class I, BMI 30 0-34 9 (see actual BMI)   • MARKELL (acute kidney injury) ([address] )   • Chronic kidney disease-mineral and bone disorder   • Acquired genu varum of right lower extremity   • Depression and anxiety   • Closed fracture of medial plateau of right tibia   • MANNY (obstructive sleep apnea)       History of Present Illness   Physician Requesting Consult: Jozef Azul MD  Reason for Consult / Principal Problem: Medical Management   Hx and PE limited by:   HPI: Natalie Frederick is a 59y o  year old male with a PMH significant for CKD, Diabetes mellitus, GERD, HLD and HTN who presented to the [address] on 05/01 for surgical management of a previous tibial plateau fracture  Patient seen in April for the same and opted out of surgical intervention at that time  Now, presented with varus malalignment, instability of the joint and significant comminution and bone void  He was taken to the OR with Dr Patti Gilmore on 05/01 and is s/p ORIF to the right tibial plateau  Ordered NWB status post-op for approximately 10-12 weeks with immobilizer in place for 2 weeks post-op  Post-op patient developed urinary retention  Urology consulted  Patient refused straight cath and was following urinary retention protocol to only get casey catheter insertion if needed  Nephrology consulted with CKD history, started on IV fluid resuscitation  Developed hyponatremia  Home HCTZ was held and IVF were stopped  Etiology determined to be SIADH secondary to HCTZ  Once HCTZ was stopped, sodium started to improve  Patient evaluated by PT/OT and deemed appropriate for post acute rehabilitation services  He was accepted to [facility] [place] [facility] and arrived on 5/10/23    History obtained from chart review and the patient    Constitutional ROS- Denies fatigue, fever, chills, night sweats, weight changes  HEENT ROS- Denies history of eye surgeries, glaucoma, headaches   Endocrine ROS- Positive history diabetes mellitus and thyroid disease  Cardiovascular ROS- Denies chest pain, palpitation, dyspnea exertion, orthopnea, claudication  Pulmonary ROS- Denies history of COPD, asthma  Denies cough, hemoptysis, shortness of breath    GI ROS- Denies abdominal pain, diarrhea, nausea, swallowing problems, vomiting, constipation, blood in stools, fecal incontinence  Hematological ROS- Denies history of easy bruising, blood clots, bleeding or blood transfusions  Genitourinary ROS- Denies recent hematuria, pyuria, flank pain, change in urinary stream, decreased urinary output, increased urinary frequency, nocturia, foamy urine, or urinary incontinence  Lymphatic ROS- Denies lymphadenopathy  Musculoskeletal ROS- Denies history of gout, muscle weakness, positive right knee pain   Dermatological ROS- Denies rash, wounds, ulcers, itching, jaundice  Psychiatric ROS- Denies  hallucinations, disorientation  Positive h/o anxiety and depression   Neurological ROS- No stroke or TIA symptoms  Denies dizziness, paresthesias, history of stroke, history of peripheral neuropathy      Historical Information   Past Medical History:   Diagnosis Date   • Broken internal right knee prosthesis ([facility] ) 01/2023   • Chronic kidney disease    • Colon polyp    • Constipation 03/09/2023   • Diabetes mellitus ([facility] )    • Disease of thyroid gland    • GERD (gastroesophageal reflux disease)    • HHS vs DKA 11/16/2018   • Hyperlipidemia    • Hypertension    • Thyroid cancer [facility])      Past Surgical History:   Procedure Laterality Date   • COLONOSCOPY     • FOOT AMPUTATION Right    • FOOT SURGERY Right 2014   • IR AORTAGRAM WITH RUN-OFF  08/06/2020   • IR TUNNELED CENTRAL LINE PLACEMENT  09/08/2020   • IR TUNNELED CENTRAL LINE REMOVAL  09/28/2020   • UT AMPUTATION FOOT TRANSMETARSAL Right 09/03/2020    Procedure: AMPUTATION TRANSMETATARSAL (TMA);  Surgeon: Rea Lay DPM;  Location: [facility];  Service: Podiatry   • UT OPEN [place] UNICONDYLAR Right 5/1/2023    Procedure: OPEN REDUCTION W/ INTERNAL FIXATION (ORIF) RIGHT TIBIAL PLATEAU NONUNION;  Surgeon: Sheryl Quintanilla MD;  Location: [facility];  Service: Orthopedics   • UT THYROIDECTOMY TOTAL/COMPLETE N/A 02/03/2021    Procedure: TOTAL THYROIDECTOMY WITH NIMS MONITORING;  Surgeon: Jair Mejias MD;  Location: [facility];  Service: ENT   • TOE AMPUTATION Right 08/11/2020    Procedure: AMPUTATION TOE;  Surgeon: Mckenna Sam DPM;  Location: [facility];  Service: Podiatry   • [address] THYROID BIOPSY  07/02/2020     Social History   Social History     Substance and Sexual Activity   Alcohol Use Not Currently    Comment: quit 1-2023     Social History     Substance and Sexual Activity   Drug Use Not Currently   • Types: Marijuana     Social History     Tobacco Use   Smoking Status Never   Smokeless Tobacco Never     Family History   Problem Relation Age of Onset   • Cancer Mother    • Hypertension Father        Meds/Allergies   all current active meds have been reviewed, current meds:   Current Facility-Administered Medications   Medication Dose Route Frequency   • acetaminophen (TYLENOL) tablet 650 mg  650 mg Oral Q8H PRN   • aspirin chewable tablet 81 mg  81 mg Oral Daily   • atorvastatin (LIPITOR) tablet 40 mg  40 mg Oral QAM   • clopidogrel (PLAVIX) tablet 75 mg  75 mg Oral QAM   • enoxaparin (LOVENOX) subcutaneous injection 40 mg  40 mg Subcutaneous Daily   • insulin glargine (LANTUS) subcutaneous injection 20 Units 0 2 mL  20 Units Subcutaneous HS   • insulin lispro (HumaLOG) 100 units/mL subcutaneous injection 15 Units  15 Units Subcutaneous TID With Meals   • insulin lispro (HumaLOG) 100 units/mL subcutaneous injection 2-12 Units  2-12 Units Subcutaneous 4x Daily (AC & HS)   • levothyroxine tablet 150 mcg  150 mcg Oral Early Morning   • magnesium hydroxide (MILK OF MAGNESIA) oral suspension 30 mL  30 mL Oral Daily PRN   • methocarbamol (ROBAXIN) tablet 500 mg  500 mg Oral Q6H PRN   • ondansetron (ZOFRAN-ODT) dispersible tablet 4 mg  4 mg Oral Q6H PRN   • pantoprazole (PROTONIX) EC tablet 40 mg  40 mg Oral Early Morning   • senna-docusate sodium (SENOKOT S) 8 6-50 mg per tablet 1 tablet  1 tablet Oral Daily   • tamsulosin (FLOMAX) capsule 0 4 mg  0 4 mg Oral Daily With Dinner   • verapamil (CALAN-SR) CR tablet 360 mg  360 mg Oral HS   • zolpidem (AMBIEN) tablet 10 mg  10 mg Oral HS PRN    and PTA meds:    Medications Prior to Admission   Medication   • acetaminophen (TYLENOL) 325 mg tablet   • atorvastatin (LIPITOR) 40 mg tablet   • BD PEN NEEDLE DEV U/F 32G X 4 MM MISC   • Blood Glucose Monitoring Suppl (ONE TOUCH ULTRA 2) w/Device KIT   • Blood Glucose Monitoring Suppl (OneTouch Verio Reflect) w/Device KIT   • clopidogrel (PLAVIX) 75 mg tablet   • enoxaparin (LOVENOX) 40 mg/0 4 mL   • escitalopram (LEXAPRO) 10 mg tablet   • glucose blood (OneTouch Verio) test strip   • HYDROmorphone (DILAUDID) 2 mg tablet   • insulin glargine (LANTUS) 100 units/mL subcutaneous injection   • Insulin Lispro (HumaLOG) 100 units/mL cartridge for injection   • levothyroxine 150 mcg tablet   • losartan (COZAAR) 50 mg tablet   • omeprazole (PriLOSEC) 40 MG capsule   • OneTouch Delica Lancets 80W MISC   • tamsulosin (FLOMAX) 0 4 mg   • verapamil (VERELAN PM) 360 MG 24 hr capsule   • zolpidem (AMBIEN) 10 mg tablet         Allergies   Allergen Reactions   • Pollen Extract Eye Swelling     Eyes get watery        Objective     Intake/Output Summary (Last 24 hours) at 5/12/2023 1617  Last data filed at 5/12/2023 1231  Gross per 24 hour   Intake 890 ml   Output 1250 ml   Net -360 ml       Invasive Devices:        Physical Exam  Vitals and nursing note reviewed  Constitutional:       General: He is not in acute distress  Appearance: Normal appearance  He is not ill-appearing  HENT:      Head: Normocephalic and atraumatic  Right Ear: External ear normal       Left Ear: External ear normal       Nose: Nose normal  No rhinorrhea  Mouth/Throat:      Mouth: Mucous membranes are moist       Pharynx: Oropharynx is clear  No oropharyngeal exudate or posterior oropharyngeal erythema  Eyes:      Extraocular Movements: Extraocular movements intact  Conjunctiva/sclera: Conjunctivae normal       Pupils: Pupils are equal, round, and reactive to light  Cardiovascular:      Rate and Rhythm: Normal rate and regular rhythm  Pulmonary:      Effort: Pulmonary effort is normal       Comments: Decreased throughout   Abdominal:      General: Bowel sounds are normal  There is no distension  Palpations: Abdomen is soft  Musculoskeletal:      Cervical back: Normal range of motion and neck supple  Comments: Positive immobilizer to the right lower extremity; h/o transmetatarsal amputation    Skin:     General: Skin is warm and dry  Capillary Refill: Capillary refill takes less than 2 seconds  Neurological:      General: No focal deficit present  Mental Status: He is alert and oriented to person, place, and time  Psychiatric:         Mood and Affect: Mood normal          Behavior: Behavior normal          Thought Content: Thought content normal          Judgment: Judgment normal            I/O last 3 completed shifts: In: 750 [P O :750]  Out: 1850 [Urine:1850]    Vitals:    05/12/23 0631   BP: 108/64   Pulse: 64   Resp: 14   Temp: 97 7 °F (36 5 °C)   SpO2: 96%         Current Weight: Weight - Scale: 121 kg (266 lb 15 6 oz)  First Weight: Weight - Scale: 121 kg (266 lb 15 6 oz)    Lab Results:  I have personally reviewed pertinent labs      CBC: No results found for: WBC, HGB, HCT, MCV, PLT, ADJUSTEDWBC, MCH, MCHC, RDW, MPV, NRBC  CMP: No results found for: NA, K, CL, CO2, ANIONGAP, BUN, CREATININE, GLUCOSE, CALCIUM, AST, ALT, ALKPHOS, PROT, BILITOT, EGFR  Phosphorus: No results found for: PHOS  Magnesium: No results found for: MG  Urinalysis: No results found for: COLORU, CLARITYU, SPECGRAV, PHUR, LEUKOCYTESUR, NITRITE, PROTEINUA, GLUCOSEU, KETONESU, BILIRUBINUR, BLOODU  Ionized Calcium: No results found for: CAION  Coagulation: No results found for: PT, INR, APTT  Troponin: No results found for: TROPONINI  ABG: No results found for: PHART, VFT7FHY, PO2ART, PKR0WNK, I6OUYZMH, BEART, SOURCE    Results from last 7 days   Lab Units 05/11/23  0539 05/09/23  0522 05/07/23  0537   POTASSIUM mmol/L 3 5 3 6 3 8   CHLORIDE mmol/L 100 101 101   CO2 mmol/L 26 26 26   BUN mg/dL 11 13 17   CREATININE mg/dL 0 63 0 64 0 68   CALCIUM mg/dL 8 2* 8 3* 8 4   ALK PHOS U/L 147*  --   --    ALT U/L 11  --   --    AST U/L 24  --   --        Radiology review:  No results found  EKG, Pathology, and Other Studies: I have reviewed pertinent studies      Counseling / Coordination of Care  Total ADDITIONAL floor / unit time spent today 50  minutes  Greater than 50% of total time was spent with the patient and / or family counseling and / or coordination of care  Jah Avila      This consultation note was produced in part using a dictation device which may document imprecise wording from author's original intent

## 2023-05-12 NOTE — TREATMENT PLAN
Individualized Plan of [address] [facility] 59 y o  male MRN: [id number]  Unit/Bed#: -01 Encounter: [id number]     PATIENT INFORMATION  ADMISSION DATE: 5/10/2023  4:11 PM [place] CATEGORY:Orthopedic Disorders:  08 9  Other Orthopedic Closed fracture of medial plataeu of right tibia   ADMISSION DIAGNOSIS: Leg fracture, right [S82 [id number]]  EXPECTED LOS: 10-14 days     MEDICAL/FUNCTIONAL PROGNOSIS  Based on my assessment of the patient's medical conditions and current functional status, the prognosis for attaining medical and functional goals or the IRF stay is:  Good     Cardiopulmonary function: Ensure cardiopulmonary stability and optimize cardiopulmonary function not only at rest but with activity as patient's activity level significantly increases in acute rehab compared with prior to transfer in preparation for safe discharge from [facility]  Must closely and frequently monitor blood pressure and HR to ensure adequate cardiac output during ADLs and ambulation as patient is at increased risk for orthostatic hypotension/syncope and potential injury if not monitored for and managed adequately  Blood pressure management:    Frequent monitoring of blood pressure with appropriate adjustments in blood pressure medication management to optimize blood pressure control and prevent/limit renal complications  Monitoring impact of blood pressure and side-effects of blood pressure medications at rest and with activity  DM: Patient will improve/maintain blood sugar control to ensure optimal healing and decrease risks of complications associated with DM through medication monitoring, adjustments as indicated, and optimal dietary intake and education    Pain management:  Pain will improve with frequent evaluation of pain, careful adjustments in medications, frequent re-evaluation of patient's pain and medical/neurologic status to ensure optimal pain control, avoidance of potential serious and even life-threatening side-effects and drug interactions, as well as weaning pain medications as soon as possible to decrease risk of short and long-term use  Orthopedic Disorder: Right tibia plataeu fx s/p ORIF causing impaired mobility, ADLs, and gait:  intensive skilled therapies with physical therapy and occupational therapy with close oversight and management by rehab specialized physician in acute rehabilitation setting to most expeditiously and effectively improve functional mobility, transfers, upper and lower body strengthening, conditioning, balance, and gait training with appropriate assistive device  Patient will have optimal supervision and management of patient's underlying orthopedic disorder with specialized rehabilitation physician during this period of recovery to ensure most expeditious and optimal recovery with decreased risks of fall/injury and other complications including acute worsening of ortho disorder, decrease risk of VTE, PNA, and skin ulceration  Inpatient rehabilitation education/teaching: To be provided to patient and typically family/caregiver (if able to be identified) by all skilled therapists, rehab nursing, case management, and rehab specialized physician to ensure optimal recovery and decrease risks of complications in both acute rehabilitation setting as well as after discharge  Chronic kidney failure management and blood pressure management: Frequent measurements and evaluation of urinary intake, urinary output, and labs which include BUN/Cr and electrolytes with appropriate adjustments in medication and when necessary order additional testing  Frequent monitoring of blood pressure with appropriate adjustments in blood pressure medication management to optimize blood pressure control and prevent/limit renal complications      Electrolyte abnormality: hyponatremia:  placing patient at risk for additional complications which may impact medical stability and functional recovery  Frequent measurement and monitoring of labs by with appropriate adjustments in medication and/or fluid management by rehabilitation physician and internal medicine consultant  Skin wounds: Appropriate skin checks for wound/skin evaluation including evaluation of healing, worsening of wounds, or signs of infection  Wound care management from rehab nursing, wound care nursing, physicians  Ensure frequent appropriate turning, positioning in bed, in chair, when mobilizing, and when appropriate with use of appropriate devices to optimize healing and decrease risk of worsening or new skin breakdown  Medical Goals: Patient will be medically stable for discharge to [facility] [place] upon completion of rehab program    [address]: Home - with supervision and Home - Assistance    ANTICIPATED FOLLOW-UP SERVICE:   Outpatient Therapy Services: PT and OT      Home Health Services: PT, OT and Nursing    DISCIPLINE SPECIFIC PLANS:  Required Disciplines & Services: Rehabillitation Nursing and Case Management    REQUIRED THERAPY:  Therapy Hours per Day Days per Week Total Days   Physical Therapy 1 5 5 5   Occupational Therapy 1 5 5 5   NOTE: Additional therapy time(s) may be added as appropriate to meet patient needs and to achieve functional goals        Patient will either participate in above therapy regimen or participate in 900 minutes of therapy within 7 day week consisting of PT and OT due to the following medical procedure/condition:Orthopedic Disorders:  08 9  Other Orthopedic Closed fracture of medla plataeu of right tibia    ANTICIPATED FUNCTIONAL OUTCOMES:  ADL: Patient will require supervision with ADLs with least restrictive device upon completion of rehab program   Bladder/Bowel:     Transfers: Patient will be independent with transfers with least restrictive device upon completion of rehab program   Locomotion: Patient will be independent with locomotion with least restrictive device upon completion of rehab program      Cognitive:   Independent      DISCHARGE PLANNING NEEDS  Equipment needs: Discharge needs to be reviewed with team      REHAB ANTICIPATED PARTICIPATION RESTRICTIONS:  Assist with Bathing Shower, Assist with Mobility, Assist with Tub Shower Transfer, Inability to Drive, Rquires Assist with ADLS, Requires Assit with Homemaking, Requires Assit with Steps and NWB RLE

## 2023-05-12 NOTE — PROGRESS NOTES
05/12/23 1100   Pain Assessment   Pain Assessment Tool 0-10   Pain Score 7   Pain Location/Orientation Orientation: Right;Location: Knee; Location: Leg   Restrictions/Precautions   Precautions Bed/chair alarms; Fall Risk  ([address] right LE with KI in place)   RLE Weight Bearing Per Order NWB   Braces or Orthoses Knee immobilizer   Cognition   Overall Cognitive Status WFL   Subjective   Subjective I'm doing okay, but I'm freezing  Roll Left and Right   Type of Assistance Needed Independent   Physical Assistance Level No physical assistance   Roll Left and Right CARE Score 6   Lying to Sitting on Side of Bed   Type of Assistance Needed Independent   Physical Assistance Level No physical assistance   Lying to Sitting on Side of Bed CARE Score 6   Sit to Stand   Comment Did not perform   Bed-Chair Transfer   Type of Assistance Needed Supervision   Physical Assistance Level 25% or less   Comment Sit pivot, EOB to wheelchair, NWBing right LE maintained   Chair/Bed-to-Chair Transfer CARE Score 3   Car Transfer   Reason if not Attempted Environmental limitations   Car Transfer CARE Score 10   Ambulation   Does the patient walk? 0  No, and walking goal is not clinically indicated  Wheel 50 Feet with Two Turns   Type of Assistance Needed Supervision;Verbal cues   Physical Assistance Level No physical assistance   Comment Level surface   Wheel 50 Feet with Two Turns CARE Score 4   Wheel 150 Feet   Type of Assistance Needed Supervision   Physical Assistance Level No physical assistance   Comment Level surface, requires assist on uneven surface/thresholds  Able to negotiate on/off of elevator   Wheel 150 Feet CARE Score 4   Wheelchair mobility   Does the patient use a wheelchair? 1  Yes   Type of Wheelchair Used 1  Manual   Method Right upper extremity; Left upper extremity; Left lower extremity   Assistance Provided For Locking Brakes;Obstacles;Press Release;Remove Leg Rest;Replace Leg Rest;Remove armrests;Replace armrests Distance Level Surface (feet) 275 ft   Distance Wheeled 3% Grade 15 ft   Therapeutic Interventions   Strengthening Seated LE TE with 1# weight on left, MRE for hip ABd/ADd   Assessment   Treatment Assessment Patient presents supine in bed  Reports being cold since shower  Patient demonstrates supervision bed/chair transfer, able to maintain [address] R LE  Progressing with ease of transfer from last session  Patient is in need of ongoing PT to increased endurance and strength to allow for improved upright tolerance and ability  Cont per POC and progress as tolerated  Problem List Decreased strength;Decreased range of motion;Decreased endurance; Impaired balance;Orthopedic restrictions;Pain;Decreased safety awareness   PT Barriers   Physical Impairment Decreased strength;Decreased range of motion;Decreased endurance; Impaired balance;Decreased mobility;Orthopedic restrictions;Pain;Decreased skin integrity   Functional Limitation Car transfers; Ramp negotiation;Standing;Transfers; Wheelchair management; Walking;Stair negotiation   Plan   Treatment/Interventions Functional transfer training;LE strengthening/ROM; Elevations; Therapeutic exercise; Endurance training;Patient/family training;Equipment eval/education; Bed mobility;Gait training   Progress Progressing toward goals   PT Therapy Minutes   PT Time In 1100   PT Time Out 1200   PT Total Time (minutes) 60   PT Mode of treatment - Individual (minutes) 60   PT Mode of treatment - Concurrent (minutes) 0   PT Mode of treatment - Group (minutes) 0   PT Mode of treatment - Co-treat (minutes) 0   PT Mode of Treatment - Total time(minutes) 60 minutes   PT Cumulative Minutes 150       Patient remains OOB in chair, all needs in reach  Set up with lunch  Alarm in place and activated  Encouraged use of call bell, patient verbalizes understanding

## 2023-05-12 NOTE — PROGRESS NOTES
05/12/23 0904   Pain Assessment   Pain Assessment Tool 0-10   Pain Score 6   Pain Location/Orientation Orientation: Right;Location: Leg   Restrictions/Precautions   Precautions Fall Risk;Limb alert   RLE Weight Bearing Per Order NWB   ROM Restrictions   (RLE immobilizer at all times)   Braces or Orthoses Knee immobilizer  (RLE)   Shower/Bathe Self   Type of Assistance Needed Physical assistance   Physical Assistance Level 25% or less   Comment assist for buttocks thoroughness; pt shifted side to side on bench to wash buttocks instead of standing   Shower/Bathe Self CARE Score 3   Bathing   Assessed Bath Style Shower   Anticipated D/C Bath Style Shower;Sponge Bath   Able to [place] Bam No   Able to [employer] Temperature Yes   Able to Wash/Rinse/Dry (body part) Left Arm;Right Arm;L Upper Leg;R Upper Leg;L Lower Leg/Foot;Chest;Abdomen;Perineal Area; Buttocks  (R LE covered in bags to avoid getting immobilizer wet)   Limitations Noted in Balance; Endurance;ROM;Safety;Strength   Positioning Seated   Adaptive Equipment Longhand Sponge; Shower [place]; Shower Seat;On license of [facility] Care   Limitations Noted In Balance; Endurance;ROM;Safety;LE Strength;UE Strength   Adaptive Equipment Grab Bars;Seat with out Back   Assessed Shower   Findings sit pivot from w/c to shower bench with modA to enter and La to exit   Upper Body Dressing   Type of Assistance Needed Set-up / clean-up   Physical Assistance Level No physical assistance   Upper Body Dressing CARE Score 5   Lower Body Dressing   Type of Assistance Needed Physical assistance   Physical Assistance Level 26%-50%   Comment assist to doff shorts off bilat feet, don shorts over RLE   Lower Body Dressing CARE Score 3   Putting On/Taking Off Footwear   Type of Assistance Needed Physical assistance   Physical Assistance Level 76% or more   Comment pt able to doff L sock   Putting On/Taking Off Footwear CARE Score 2   Dressing/Undressing Clothing   Remove UB "[address] UB Clothes Pullover Shirt   Remove LB Clothes Shorts;Socks   Don LB Clothes Shorts;Socks   Limitations Noted In Balance; Endurance; Safety;Strength;ROM   Positioning Supported Sit; Sit Edge Of Bed; In Bed  (sat in w/c to don shirt, EOB to don shorts up to knees, supine to pull over buttocks)   Sit to Lying   Type of Assistance Needed Supervision   Physical Assistance Level No physical assistance   Sit to Lying CARE Score 4   Lying to Sitting on Side of Bed   Type of Assistance Needed Supervision   Physical Assistance Level No physical assistance   Lying to Sitting on Side of Bed CARE Score 4   Sit to Stand   Type of Assistance Needed Incidental touching   Comment CG; stood at grab bars in shower room to doff shorts   Sit to Stand CARE Score 4   Bed-Chair Transfer   Type of Assistance Needed Physical assistance   Physical Assistance Level 26%-50%   Comment sit pivot EOB to w/c   Chair/Bed-to-Chair Transfer CARE Score 3   Transfer Bed/Chair/Wheelchair   Limitations Noted In Balance; Endurance;Pain Management;LE Strength   Toileting Hygiene   Comment OT emptied urinal   Cognition   Overall Cognitive Status WFL   Arousal/Participation Alert; Responsive; Cooperative   Attention Within functional limits   Orientation Level Oriented X4   Memory Within functional limits   Following Commands Follows all commands and directions without difficulty   Assessment   Treatment Assessment Pt agreeable to OT session this AM  Received lying supine in bed  ADL session completed; current LOF and details listed in respective sections  Pt is overall La bathing and LB dressing, set up UB dressing and urinal use; fxl transfers min-modA with NWB RLE maintained and immobilizer in place  Pt reported moderate pain in RLE throughout session that he reported was \"tolerable\"  After ADL, pt returned to supine and had RLE dressing changed by RN; OT and pt assisted as needed   Pt continues with barriers to d/c of decreased " strength throughout but especially RLE s/p tibia fx, decreased balance, WNB RLE with knee immobilizer, and decreased activity tolerance; all affect independence in self care and fxl transfers  Pt would benefit from continued skilled OT services in order to address listed barriers and prepare for safe d/c  Prognosis Good   Problem List Decreased strength;Decreased range of motion;Decreased endurance; Impaired balance;Orthopedic restrictions;Pain;Decreased safety awareness   Plan   Treatment/Interventions ADL retraining;Functional transfer training;LE strengthening/ROM; Therapeutic exercise; Endurance training;Patient/family training;Equipment eval/education; Compensatory technique education;OT   Progress Progressing toward goals   OT Therapy Minutes   OT Time In 0904   OT Time Out 1028   OT Total Time (minutes) 84   OT Mode of treatment - Individual (minutes) 84

## 2023-05-12 NOTE — PLAN OF CARE
Problem: PAIN - ADULT  Goal: Verbalizes/displays adequate comfort level or baseline comfort level  Description: Interventions:  - Encourage patient to monitor pain and request assistance  - Assess pain using appropriate pain scale  - Administer analgesics based on type and severity of pain and evaluate response  - Implement non-pharmacological measures as appropriate and evaluate response  - Consider cultural and social influences on pain and pain management  - Notify physician/advanced practitioner if interventions unsuccessful or patient reports new pain  Outcome: Progressing     Problem: INFECTION - ADULT  Goal: Absence or prevention of progression during hospitalization  Description: INTERVENTIONS:  - Assess and monitor for signs and symptoms of infection  - Monitor lab/diagnostic results  - Administer medications as ordered  - Instruct and encourage patient and family to use good hand hygiene technique  - Identify and instruct in appropriate isolation precautions for identified infection/condition  Outcome: Progressing  Goal: Absence of fever/infection during neutropenic period  Description: INTERVENTIONS:  - Monitor WBC    Outcome: Progressing     Problem: SAFETY ADULT  Goal: Patient will remain free of falls  Description: INTERVENTIONS:  - Educate patient/family on patient safety including physical limitations  - Instruct patient to call for assistance with activity   - Consult OT/PT to assist with strengthening/mobility   - Keep Call bell within reach  - Keep bed low and locked with side rails adjusted as appropriate  - Keep care items and personal belongings within reach  - Initiate and maintain comfort rounds  - Make Fall Risk Sign visible to staff  - Apply yellow socks and bracelet for high fall risk patients  - Consider moving patient to room near nurses station  Outcome: Progressing  Goal: Maintain or return to baseline ADL function  Description: INTERVENTIONS:  -  Assess patient's ability to carry out ADLs; assess patient's baseline for ADL function and identify physical deficits which impact ability to perform ADLs (bathing, care of mouth/teeth, toileting, grooming, dressing, etc )  - Assess/evaluate cause of self-care deficits   - Assess range of motion  - Assess patient's mobility; develop plan if impaired  - Assess patient's need for assistive devices and provide as appropriate  - Encourage maximum independence but intervene and supervise when necessary  - Involve family in performance of ADLs  - Assess for home care needs following discharge   - Consider OT consult to assist with ADL evaluation and planning for discharge  - Provide patient education as appropriate  Outcome: Progressing  Goal: Maintains/Returns to pre admission functional level  Description: INTERVENTIONS:  - Set and communicate daily mobility goal to care team and patient/family/caregiver     - Collaborate with rehabilitation services on mobility goals if consulted  - Out of bed for toileting  - Record patient progress and toleration of activity level   Outcome: Progressing     Problem: DISCHARGE PLANNING  Goal: Discharge to home or other facility with appropriate resources  Description: INTERVENTIONS:  - Identify barriers to discharge w/patient and caregiver  - Arrange for needed discharge resources and transportation as appropriate  - Identify discharge learning needs (meds, wound care, etc )  - Arrange for interpretive services to assist at discharge as needed  - Refer to Case Management Department for coordinating discharge planning if the patient needs post-hospital services based on physician/advanced practitioner order or complex needs related to functional status, cognitive ability, or social support system  Outcome: Progressing     Problem: Prexisting or High Potential for Compromised Skin Integrity  Goal: Skin integrity is maintained or improved  Description: INTERVENTIONS:  - Identify patients at risk for skin breakdown  - Assess and monitor skin integrity  - Assess and monitor nutrition and hydration status  - Monitor labs   - Assess for incontinence   - Turn and reposition patient  - Assist with mobility/ambulation  - Relieve pressure over bony prominences  - Avoid friction and shearing  - Provide appropriate hygiene as needed including keeping skin clean and dry  - Evaluate need for skin moisturizer/barrier cream  - Collaborate with interdisciplinary team   - Patient/family teaching  - Consider wound care consult   Outcome: Progressing     Problem: MOBILITY - ADULT  Goal: Maintain or return to baseline ADL function  Description: INTERVENTIONS:  -  Assess patient's ability to carry out ADLs; assess patient's baseline for ADL function and identify physical deficits which impact ability to perform ADLs (bathing, care of mouth/teeth, toileting, grooming, dressing, etc )  - Assess/evaluate cause of self-care deficits   - Assess range of motion  - Assess patient's mobility; develop plan if impaired  - Assess patient's need for assistive devices and provide as appropriate  - Encourage maximum independence but intervene and supervise when necessary  - Involve family in performance of ADLs  - Assess for home care needs following discharge   - Consider OT consult to assist with ADL evaluation and planning for discharge  - Provide patient education as appropriate  Outcome: Progressing  Goal: Maintains/Returns to pre admission functional level  Description: INTERVENTIONS:  - Set and communicate daily mobility goal to care team and patient/family/caregiver     - Collaborate with rehabilitation services on mobility goals if consulted  - Out of bed for toileting  - Record patient progress and toleration of activity level   Outcome: Progressing

## 2023-05-13 DIAGNOSIS — S82.131A RIGHT MEDIAL TIBIAL PLATEAU FRACTURE, CLOSED, INITIAL ENCOUNTER: Primary | ICD-10-CM

## 2023-05-13 LAB
GLUCOSE SERPL-MCNC: 118 MG/DL (ref 65–140)
GLUCOSE SERPL-MCNC: 135 MG/DL (ref 65–140)
GLUCOSE SERPL-MCNC: 210 MG/DL (ref 65–140)
GLUCOSE SERPL-MCNC: 225 MG/DL (ref 65–140)

## 2023-05-13 PROCEDURE — 97110 THERAPEUTIC EXERCISES: CPT

## 2023-05-13 PROCEDURE — 97530 THERAPEUTIC ACTIVITIES: CPT

## 2023-05-13 PROCEDURE — 97535 SELF CARE MNGMENT TRAINING: CPT

## 2023-05-13 PROCEDURE — 82948 REAGENT STRIP/BLOOD GLUCOSE: CPT

## 2023-05-13 PROCEDURE — 97542 WHEELCHAIR MNGMENT TRAINING: CPT

## 2023-05-13 RX ORDER — ACETAMINOPHEN 325 MG/1
650 TABLET ORAL EVERY 8 HOURS PRN
Status: DISCONTINUED | OUTPATIENT
Start: 2023-05-13 | End: 2023-05-30 | Stop reason: HOSPADM

## 2023-05-13 RX ADMIN — INSULIN GLARGINE 20 UNITS: 100 INJECTION, SOLUTION SUBCUTANEOUS at 21:55

## 2023-05-13 RX ADMIN — ENOXAPARIN SODIUM 40 MG: 40 INJECTION SUBCUTANEOUS at 05:47

## 2023-05-13 RX ADMIN — INSULIN LISPRO 4 UNITS: 100 INJECTION, SOLUTION INTRAVENOUS; SUBCUTANEOUS at 11:47

## 2023-05-13 RX ADMIN — ASPIRIN 81 MG 81 MG: 81 TABLET ORAL at 08:22

## 2023-05-13 RX ADMIN — ACETAMINOPHEN 650 MG: 325 TABLET ORAL at 21:53

## 2023-05-13 RX ADMIN — INSULIN LISPRO 4 UNITS: 100 INJECTION, SOLUTION INTRAVENOUS; SUBCUTANEOUS at 08:04

## 2023-05-13 RX ADMIN — PANTOPRAZOLE SODIUM 40 MG: 40 TABLET, DELAYED RELEASE ORAL at 05:47

## 2023-05-13 RX ADMIN — ATORVASTATIN CALCIUM 40 MG: 40 TABLET, FILM COATED ORAL at 08:22

## 2023-05-13 RX ADMIN — FERROUS SULFATE TAB 325 MG (65 MG ELEMENTAL FE) 325 MG: 325 (65 FE) TAB at 07:21

## 2023-05-13 RX ADMIN — TAMSULOSIN HYDROCHLORIDE 0.4 MG: 0.4 CAPSULE ORAL at 16:28

## 2023-05-13 RX ADMIN — INSULIN LISPRO 15 UNITS: 100 INJECTION, SOLUTION INTRAVENOUS; SUBCUTANEOUS at 17:01

## 2023-05-13 RX ADMIN — ACETAMINOPHEN 650 MG: 325 TABLET ORAL at 11:16

## 2023-05-13 RX ADMIN — VERAPAMIL HYDROCHLORIDE 360 MG: 180 TABLET ORAL at 21:53

## 2023-05-13 RX ADMIN — ZOLPIDEM TARTRATE 10 MG: 5 TABLET ORAL at 21:53

## 2023-05-13 RX ADMIN — INSULIN LISPRO 15 UNITS: 100 INJECTION, SOLUTION INTRAVENOUS; SUBCUTANEOUS at 08:04

## 2023-05-13 RX ADMIN — LEVOTHYROXINE SODIUM 150 MCG: 75 TABLET ORAL at 05:47

## 2023-05-13 RX ADMIN — INSULIN LISPRO 15 UNITS: 100 INJECTION, SOLUTION INTRAVENOUS; SUBCUTANEOUS at 11:47

## 2023-05-13 RX ADMIN — SENNOSIDES AND DOCUSATE SODIUM 1 TABLET: 50; 8.6 TABLET ORAL at 08:22

## 2023-05-13 RX ADMIN — CLOPIDOGREL BISULFATE 75 MG: 75 TABLET ORAL at 08:22

## 2023-05-13 NOTE — PROGRESS NOTES
05/13/23 0931   Pain Assessment   Pain Assessment Tool 0-10   Pain Score 8  (Also with reported back discomfort up to 6)   Pain Location/Orientation Orientation: Right;Location: Knee  (Knee to ankle)   Restrictions/Precautions   Precautions Bed/chair alarms; Fall Risk  (NWB R LE; Knee Immobilizer in place)   Weight Bearing Restrictions Yes   RLE Weight Bearing Per Order NWB   ROM Restrictions   (R LE immobilizer at all times)   Oral Hygiene   Type of Assistance Needed Set-up / clean-up   Oral Hygiene CARE Score 5   Sit to Lying   Type of Assistance Needed Incidental touching;Verbal cues   Sit to Lying CARE Score 4   Bed-Chair Transfer   Type of Assistance Needed Physical assistance;Verbal cues   Physical Assistance Level 25% or less   Chair/Bed-to-Chair Transfer CARE Score 3   Toileting Hygiene   Type of Assistance Needed Physical assistance   Physical Assistance Level 26%-50%   Comment Min A with bowel hygiene throughly; Mod A and with use of grab bars with partial standing to manage shorts; VC for NWB R LE   Toileting Hygiene CARE Score 3   Toileting   Able to [address] down no, up no  Manage Hygiene Bowel;Bladder   Limitations Noted In Balance; Coordination;Problem Solving;ROM;Safety;UE Strength;LE Strength   Adaptive Equipment Grab Bar   Toilet Transfer   Type of Assistance Needed Physical assistance;Verbal cues   Physical Assistance Level 26%-50%   Comment Sit pivot WC<>toliet with Min A; VC for NWB R LE   Toilet Transfer CARE Score 3   Toilet Transfer   Surface Assessed Standard Toilet   Transfer Technique Sit Pivot   Limitations Noted In Balance; Endurance;ROM;Safety;UE Strength;LE Strength   Exercise Tools   Exercise Tools Yes   UE Ergometer Seated, 4/4 minutes forward/backwards   Theraputty Pt utilized B/L hands to manipulate Yellow Theraputty to find and obtain small items from within putty   Other Exercise Tool 1 B/L supination/pronation, Large Yellow Flex bar, 2x15   Other Exercise Tool 2 B/L UE AROM all available planes, 3# bar, 2x15   Other Exercise Tool 3 R and L hand grasp and release with 5# digiflex, 30x   Additional Activities   Additional Activities Other (Comment)   Additional Activities Comments WC mobility completed within facility hallways, intermittent rest breaks with fatigue; In seated position, Pt completed card match activity on table surface   Assessment   Treatment Assessment Pt receieved sitting in [facility] and agreeable to participation with OT session  Pt expressed good motivation however with reported overall fatigue and benifitted from increased time and rest breaks  Pt trialed toilet transfer with sit pivot<>WC with up to Min/Mod A  Pt requested transfer to bed to complete UE exercises due to fatigue and discomfort with back  Pt would benefit from continued participation with OT services to address barriers and support improved strength, balance, coordination, endurance and activity tolerence for use with functional transfers, mobility and ADL performance and help facilitate safe discharge     OT Therapy Minutes   OT Time In 8138   OT Time Out 1158   OT Total Time (minutes) 147   OT Mode of treatment - Individual (minutes) 147

## 2023-05-13 NOTE — NURSING NOTE
[facility] specialty bed received  [employer] texted by charge nurse to examine prior to pt use  Inspection will be sometime tonight

## 2023-05-13 NOTE — PROGRESS NOTES
"   05/13/23 0830   Pain Assessment   Pain Assessment Tool 0-10   Pain Score 8   Pain Location/Orientation Orientation: Right;Location: Knee   Restrictions/Precautions   Precautions Bed/chair alarms; Fall Risk   Weight Bearing Restrictions Yes   RLE Weight Bearing Per Order NWB   Cognition   Overall Cognitive Status WFL   Arousal/Participation Alert; Responsive; Cooperative   Attention Within functional limits   Orientation Level Oriented X4   Memory Within functional limits   Following Commands Follows all commands and directions without difficulty   Subjective   Subjective \"I am very tired  I didn't sleep well  I am exhausted  They worked me out yesterday  \"   Transfer Bed/Chair/Wheelchair   Sit Pivot Supervision   Supine to Sit Supervision   Wheelchair mobility   Does the patient use a wheelchair? 1  Yes   Type of Wheelchair Used 1  Manual   Method Right upper extremity; Left upper extremity; Left lower extremity   Distance Level Surface (feet) 190 ft   Therapeutic Interventions   Strengthening seated L LE general strengthening   Assessment   Treatment Assessment Pt pleasant and willing to participate in therapy  Pt noted increased overall fatigue at start of session  Several rest periods during WC mobility, noted increased shoulder fatigue  Able to complete seated LLE strengthening with 2# cuff ankle  Quick muscle fatigue noted in quads and hamstrings  Pt would benefit continued skilled therapy to increase endurance and strength  PT Barriers   Physical Impairment Decreased strength;Decreased range of motion;Decreased endurance; Impaired balance;Decreased mobility;Orthopedic restrictions;Pain;Decreased skin integrity   Functional Limitation Car transfers; Ramp negotiation;Standing;Transfers; Wheelchair management; Walking;Stair negotiation   Plan   Treatment/Interventions ADL retraining;Functional transfer training;LE strengthening/ROM; Therapeutic exercise; Endurance training;Bed mobility;Gait training   Progress " Progressing toward goals   PT Therapy Minutes   PT Time In 0830   PT Time Out 0930   PT Total Time (minutes) 60   PT Mode of treatment - Individual (minutes) 60   Therapy Time missed   Time missed?  No

## 2023-05-13 NOTE — NURSING NOTE
"Refused bath today  Self oral care and perineal care done  Tolerated therapy  Refused to get OOB to chair for lunch d/t \"busy day with therapy  \"   Tylenol given after therapy per request with pain relief obtained  See pain reassessment  Treatments to wounds as ordered  RLE ace DDI with immobilizer maintaIned  NV check stable to same  Continue same plan of care      "

## 2023-05-14 LAB
GLUCOSE SERPL-MCNC: 143 MG/DL (ref 65–140)
GLUCOSE SERPL-MCNC: 146 MG/DL (ref 65–140)
GLUCOSE SERPL-MCNC: 191 MG/DL (ref 65–140)
GLUCOSE SERPL-MCNC: 253 MG/DL (ref 65–140)

## 2023-05-14 PROCEDURE — 97542 WHEELCHAIR MNGMENT TRAINING: CPT

## 2023-05-14 PROCEDURE — 97530 THERAPEUTIC ACTIVITIES: CPT

## 2023-05-14 PROCEDURE — 82948 REAGENT STRIP/BLOOD GLUCOSE: CPT

## 2023-05-14 PROCEDURE — 97110 THERAPEUTIC EXERCISES: CPT

## 2023-05-14 PROCEDURE — 97535 SELF CARE MNGMENT TRAINING: CPT

## 2023-05-14 RX ADMIN — INSULIN LISPRO 2 UNITS: 100 INJECTION, SOLUTION INTRAVENOUS; SUBCUTANEOUS at 08:36

## 2023-05-14 RX ADMIN — ENOXAPARIN SODIUM 40 MG: 40 INJECTION SUBCUTANEOUS at 05:56

## 2023-05-14 RX ADMIN — INSULIN GLARGINE 20 UNITS: 100 INJECTION, SOLUTION SUBCUTANEOUS at 21:05

## 2023-05-14 RX ADMIN — FERROUS SULFATE TAB 325 MG (65 MG ELEMENTAL FE) 325 MG: 325 (65 FE) TAB at 08:35

## 2023-05-14 RX ADMIN — INSULIN LISPRO 6 UNITS: 100 INJECTION, SOLUTION INTRAVENOUS; SUBCUTANEOUS at 12:04

## 2023-05-14 RX ADMIN — INSULIN LISPRO 15 UNITS: 100 INJECTION, SOLUTION INTRAVENOUS; SUBCUTANEOUS at 12:03

## 2023-05-14 RX ADMIN — LEVOTHYROXINE SODIUM 150 MCG: 75 TABLET ORAL at 05:56

## 2023-05-14 RX ADMIN — INSULIN LISPRO 15 UNITS: 100 INJECTION, SOLUTION INTRAVENOUS; SUBCUTANEOUS at 08:35

## 2023-05-14 RX ADMIN — ZOLPIDEM TARTRATE 10 MG: 5 TABLET ORAL at 21:05

## 2023-05-14 RX ADMIN — TAMSULOSIN HYDROCHLORIDE 0.4 MG: 0.4 CAPSULE ORAL at 17:10

## 2023-05-14 RX ADMIN — PANTOPRAZOLE SODIUM 40 MG: 40 TABLET, DELAYED RELEASE ORAL at 05:56

## 2023-05-14 RX ADMIN — VERAPAMIL HYDROCHLORIDE 360 MG: 180 TABLET ORAL at 21:05

## 2023-05-14 RX ADMIN — ATORVASTATIN CALCIUM 40 MG: 40 TABLET, FILM COATED ORAL at 08:35

## 2023-05-14 RX ADMIN — INSULIN LISPRO 15 UNITS: 100 INJECTION, SOLUTION INTRAVENOUS; SUBCUTANEOUS at 17:10

## 2023-05-14 RX ADMIN — CLOPIDOGREL BISULFATE 75 MG: 75 TABLET ORAL at 08:35

## 2023-05-14 RX ADMIN — ASPIRIN 81 MG 81 MG: 81 TABLET ORAL at 08:35

## 2023-05-14 NOTE — PROGRESS NOTES
OT Daily Progress       05/14/23 0700   Pain Assessment   Pain Assessment Tool 0-10   Pain Score 3   Pain Location/Orientation Orientation: Left; Location: Knee   Hospital Pain Intervention(s) Repositioned   Restrictions/Precautions   Precautions   (NWB R LE, KI in place)   RLE Weight Bearing Per Order NWB   Braces or Orthoses Knee immobilizer   Eating   Type of Assistance Needed Independent   Physical Assistance Level No physical assistance   Eating CARE Score 6   Oral Hygiene   Type of Assistance Needed Set-up / clean-up   Physical Assistance Level No physical assistance   Oral Hygiene CARE Score 5   Grooming   Able To Initiate Tasks;Comb/Brush Hair;Wash/Dry Face;Brush/Clean Teeth;Wash/Dry Hands   Limitation Noted In Strength   Bathing   Assessed Bath Style Sponge Bath   Anticipated D/C Bath Style Shower;Sponge Bath   Able to [place] Bam No   Able to [employer] Temperature Yes   Able to Wash/Rinse/Dry (body part) Right Arm;Left Arm;L Upper Leg;R Upper Leg;L Lower Leg/Foot;Chest;Abdomen;Perineal Area; Buttocks   Limitations Noted in Balance; Endurance;ROM;Safety;Strength   Positioning Seated;Supine   Findings  completes sponge bath in bed and seated at EOB  Able to roll independently for levi hygiene   Upper Body Dressing   Type of Assistance Needed Set-up / clean-up   Physical Assistance Level No physical assistance   Upper Body Dressing CARE Score 5   Lower Body Dressing   Type of Assistance Needed Physical assistance   Physical Assistance Level 26%-50%   Comment able to weight shift to manage shorts for levi bathing/drying  Max A for managing ace wrap and R LE KI in bed  Lower Body Dressing CARE Score 3   Dressing/Undressing Clothing   Remove UB Clothes Pullover Shirt   Don UB Clothes Pullover Shirt   Remove LB Clothes Shorts   Don LB Clothes Shorts   Limitations Noted In Balance; Endurance; Safety;Strength;ROM   Roll Left and Right   Type of Assistance Needed Independent   Physical Assistance Level No physical assistance   Roll Left and Right CARE Score 6   Sit to Lying   Type of Assistance Needed Supervision   Physical Assistance Level No physical assistance   Sit to Lying CARE Score 4   Lying to Sitting on Side of Bed   Type of Assistance Needed Supervision   Physical Assistance Level No physical assistance   Lying to Sitting on Side of Bed CARE Score 4   Cognition   Overall Cognitive Status [facility]   Arousal/Participation Alert; Responsive; Cooperative   Attention Within functional limits   Orientation Level Oriented X4   Memory Within functional limits   Following Commands Follows all commands and directions without difficulty   Activity Tolerance   Activity Tolerance Patient tolerated treatment well;Patient limited by fatigue   Assessment   Treatment Assessment Pt seen for follow up visit  Pt asleep upon approach, awakens with verbal cues  Agreeable to OT  Completed self care tasks from supine/EOB as noted in respective sections  Tolerating fair overall - limited by pain, decreased WB status  Maricel Ayad to regain independence with self care tasks and return home  Left with needs in reach  Will continue to benefit from OT to max I and safety with self care prior to discharge  Progress as status allows     OT Therapy Minutes   OT Time In 0700   OT Time Out 0820   OT Total Time (minutes) 80   OT Mode of treatment - Individual (minutes) 80

## 2023-05-14 NOTE — NURSING NOTE
05/14/23 Patient cooperative with therapy  Pain controlled with PRN tylenol upon request  Dressings changed to RLE incision and 2 areas of LLE  R leg incision area still with edema and slight serosanguinous drainage noted when old dressings removed  Left shin area remains open and left foot area more moist with edges beginning to lift  Will ask wound care nurse to re-eval area on Monday to assess if same treatments are still appropriate  Continent using urinal independently  Education continues on DM control, and repositioning to prevent any further skin breakdown  Patient did order sandwich from [employer]  Will continue to support and follow same plan of care    Josefina Keene

## 2023-05-14 NOTE — PROGRESS NOTES
"   05/14/23 0930   Pain Assessment   Pain Assessment Tool 0-10   Pain Score 7   Pain Location/Orientation Orientation: Left; Location: Leg   Pain Onset/Description Onset: Ongoing   Patient's Stated Pain Goal No pain   Restrictions/Precautions   Precautions Bed/chair alarms; Fall Risk  (R LE knee immobilizer; NWB)   Weight Bearing Restrictions Yes   RLE Weight Bearing Per Order NWB   Braces or Orthoses Knee immobilizer   Cognition   Overall Cognitive Status WFL   Arousal/Participation Alert; Cooperative   Attention Within functional limits   Orientation Level Oriented X4   Memory Within functional limits   Following Commands Follows all commands and directions without difficulty   Comments pt lying in bed; KI intact   Subjective   Subjective \"I worked in therapy for 4 hours yesterday and im tired today   Roll Left and Right   Type of Assistance Needed Independent   Physical Assistance Level No physical assistance   Comment bed rails   Roll Left and Right CARE Score 6   Sit to Lying   Type of Assistance Needed Supervision   Physical Assistance Level No physical assistance   Comment bed rail   Sit to Lying CARE Score 4   Lying to Sitting on Side of Bed   Type of Assistance Needed Supervision;Verbal cues   Physical Assistance Level No physical assistance   Comment bed rails   Lying to Sitting on Side of Bed CARE Score 4   Sit to Stand   Reason if not Attempted Safety concerns   Sit to Stand CARE Score 88   Bed-Chair Transfer   Type of Assistance Needed Supervision;Set-up / clean-up   Physical Assistance Level No physical assistance   Comment sit pivot   Chair/Bed-to-Chair Transfer CARE Score 4   Transfer Bed/Chair/Wheelchair   Limitations Noted In Balance; Endurance;Pain Management;Problem Solving;UE Strength;LE Strength   Adaptive Equipment   (R LE prosthesis)   Sit Pivot Supervision   Supine to Sit Supervision   Sit to Supine Supervision   Car Transfer   Reason if not Attempted Environmental limitations   Car Transfer " CARE Score 10   Walk 10 Feet   Reason if not Attempted Safety concerns   Walk 10 Feet CARE Score 88   Walk 50 Feet with Two Turns   Reason if not Attempted Safety concerns   Walk 50 Feet with Two Turns CARE Score 88   Walk 150 Feet   Reason if not Attempted Safety concerns   Walk 150 Feet CARE Score 88   Walking 10 Feet on Uneven Surfaces   Reason if not Attempted Safety concerns   Walking 10 Feet on Uneven Surfaces CARE Score 88   Ambulation   Does the patient walk? 0  No, and walking goal is not clinically indicated  Wheel 50 Feet with Two Turns   Type of Assistance Needed Supervision;Verbal cues   Physical Assistance Level No physical assistance   Wheel 50 Feet with Two Turns CARE Score 4   Wheel 150 Feet   Type of Assistance Needed Supervision   Physical Assistance Level No physical assistance   Wheel 150 Feet CARE Score 4   Wheelchair mobility   Does the patient use a wheelchair? 1  Yes   Type of Wheelchair Used 1  Manual   Method Right upper extremity; Left upper extremity   Assistance Provided For Locking Brakes; Remove Leg Rest;Replace Leg Rest;Remove armrests;Replace armrests   Distance Level Surface (feet) 264 ft  (98')   Distance Wheeled 3% Grade 20 ft   Findings frequent rest periods along the way   Curb or Single Stair   Reason if not Attempted Activity not applicable   1 Step (Curb) CARE Score 9   4 Steps   Reason if not Attempted Activity not applicable   4 Steps CARE Score 9   12 Steps   Reason if not Attempted Activity not applicable   12 Steps CARE Score 9   Stairs   Findings n/a   Picking Up Object   Reason if not Attempted Safety concerns   Picking Up Object CARE Score 88   Therapeutic Interventions   Strengthening seated TE to L LE   Assessment   Treatment Assessment pt was seen lying in bed; S supine to sit iwht bed rails; sitting at EOB wiht fair + balance; performed sit pivot transfers iwth S from bed to w/c   he propelled himself wiht S x 264 ft using b/l UE; wiht multiple rest periods taken throughout  he performed seated TE to LLE all done 30 reps and 2# with rest periods b/n sets and exercises  he propelled himself back to room x 98ft wiht S using b/l UE; he performed  sit pivot iwht S; he transferred from sit to supine with S and using bed rails for assist   needs in reach; KI intact throughout entire PT session; needed readjustment of KI at the beginning of PT session since it had slid down leg from lying in bed this am   he will continue to benefit from skilled PT services to reach goals set in PT  Problem List Decreased strength;Decreased range of motion;Decreased endurance; Impaired balance;Orthopedic restrictions;Pain;Decreased safety awareness   PT Barriers   Physical Impairment Decreased strength;Decreased range of motion;Decreased endurance; Impaired balance;Orthopedic restrictions;Pain;Decreased safety awareness   Functional Limitation Wheelchair management; Walking;Transfers;Standing;Stair negotiation;Ramp negotiation;Car transfers   Plan   Treatment/Interventions Functional transfer training;LE strengthening/ROM; Elevations; Therapeutic exercise; Endurance training;Patient/family training;Bed mobility;Gait training   Progress Progressing toward goals   PT Therapy Minutes   PT Time In 0930   PT Time Out 1030   PT Total Time (minutes) 60   PT Mode of treatment - Individual (minutes) 60

## 2023-05-14 NOTE — PLAN OF CARE
Problem: PAIN - ADULT  Goal: Verbalizes/displays adequate comfort level or baseline comfort level  Description: Interventions:  - Encourage patient to monitor pain and request assistance  - Assess pain using appropriate pain scale  - Administer analgesics based on type and severity of pain and evaluate response  - Implement non-pharmacological measures as appropriate and evaluate response  - Consider cultural and social influences on pain and pain management  - Notify physician/advanced practitioner if interventions unsuccessful or patient reports new pain  Outcome: Progressing     Problem: INFECTION - ADULT  Goal: Absence or prevention of progression during hospitalization  Description: INTERVENTIONS:  - Assess and monitor for signs and symptoms of infection  - Monitor lab/diagnostic results  - Administer medications as ordered  - Instruct and encourage patient and family to use good hand hygiene technique  - Identify and instruct in appropriate isolation precautions for identified infection/condition  Outcome: Progressing  Goal: Absence of fever/infection during neutropenic period  Description: INTERVENTIONS:  - Monitor WBC    Outcome: Progressing     Problem: SAFETY ADULT  Goal: Patient will remain free of falls  Description: INTERVENTIONS:  - Educate patient/family on patient safety including physical limitations  - Instruct patient to call for assistance with activity   - Consult OT/PT to assist with strengthening/mobility   - Keep Call bell within reach  - Keep bed low and locked with side rails adjusted as appropriate  - Keep care items and personal belongings within reach  - Initiate and maintain comfort rounds  - Make Fall Risk Sign visible to staff  - Apply yellow socks and bracelet for high fall risk patients  - Consider moving patient to room near nurses station  Outcome: Progressing  Goal: Maintain or return to baseline ADL function  Description: INTERVENTIONS:  -  Assess patient's ability to carry out ADLs; assess patient's baseline for ADL function and identify physical deficits which impact ability to perform ADLs (bathing, care of mouth/teeth, toileting, grooming, dressing, etc )  - Assess/evaluate cause of self-care deficits   - Assess range of motion  - Assess patient's mobility; develop plan if impaired  - Assess patient's need for assistive devices and provide as appropriate  - Encourage maximum independence but intervene and supervise when necessary  - Involve family in performance of ADLs  - Assess for home care needs following discharge   - Consider OT consult to assist with ADL evaluation and planning for discharge  - Provide patient education as appropriate  Outcome: Progressing  Goal: Maintains/Returns to pre admission functional level  Description: INTERVENTIONS:  - Set and communicate daily mobility goal to care team and patient/family/caregiver     - Collaborate with rehabilitation services on mobility goals if consulted  - Out of bed for toileting  - Record patient progress and toleration of activity level   Outcome: Progressing     Problem: DISCHARGE PLANNING  Goal: Discharge to home or other facility with appropriate resources  Description: INTERVENTIONS:  - Identify barriers to discharge w/patient and caregiver  - Arrange for needed discharge resources and transportation as appropriate  - Identify discharge learning needs (meds, wound care, etc )  - Arrange for interpretive services to assist at discharge as needed  - Refer to Case Management Department for coordinating discharge planning if the patient needs post-hospital services based on physician/advanced practitioner order or complex needs related to functional status, cognitive ability, or social support system  Outcome: Progressing     Problem: Potential for Falls  Goal: Patient will remain free of falls  Description: INTERVENTIONS:  - Educate patient/family on patient safety including physical limitations  - Instruct patient to call for assistance with activity   - Consult OT/PT to assist with strengthening/mobility   - Keep Call bell within reach  - Keep bed low and locked with side rails adjusted as appropriate  - Keep care items and personal belongings within reach  - Initiate and maintain comfort rounds  - Make Fall Risk Sign visible to staff  - Apply yellow socks and bracelet for high fall risk patients  - Consider moving patient to room near nurses station  Outcome: Progressing     Problem: Prexisting or High Potential for Compromised Skin Integrity  Goal: Skin integrity is maintained or improved  Description: INTERVENTIONS:  - Identify patients at risk for skin breakdown  - Assess and monitor skin integrity  - Assess and monitor nutrition and hydration status  - Monitor labs   - Assess for incontinence   - Turn and reposition patient  - Assist with mobility/ambulation  - Relieve pressure over bony prominences  - Avoid friction and shearing  - Provide appropriate hygiene as needed including keeping skin clean and dry  - Evaluate need for skin moisturizer/barrier cream  - Collaborate with interdisciplinary team   - Patient/family teaching  - Consider wound care consult   Outcome: Progressing     Problem: MOBILITY - ADULT  Goal: Maintain or return to baseline ADL function  Description: INTERVENTIONS:  -  Assess patient's ability to carry out ADLs; assess patient's baseline for ADL function and identify physical deficits which impact ability to perform ADLs (bathing, care of mouth/teeth, toileting, grooming, dressing, etc )  - Assess/evaluate cause of self-care deficits   - Assess range of motion  - Assess patient's mobility; develop plan if impaired  - Assess patient's need for assistive devices and provide as appropriate  - Encourage maximum independence but intervene and supervise when necessary  - Involve family in performance of ADLs  - Assess for home care needs following discharge   - Consider OT consult to assist with ADL evaluation and planning for discharge  - Provide patient education as appropriate  Outcome: Progressing  Goal: Maintains/Returns to pre admission functional level  Description: INTERVENTIONS:  - Perform BMAT or MOVE assessment daily    - Set and communicate daily mobility goal to care team and patient/family/caregiver     - Collaborate with rehabilitation services on mobility goals if consulted  - Out of bed for toileting  - Record patient progress and toleration of activity level   Outcome: Progressing

## 2023-05-15 ENCOUNTER — TELEPHONE (OUTPATIENT)
Dept: NEPHROLOGY | Facility: CLINIC | Age: 58
End: 2023-05-15

## 2023-05-15 LAB
ANION GAP SERPL CALCULATED.3IONS-SCNC: 8 MMOL/L (ref 4–13)
BASOPHILS # BLD AUTO: 0.05 THOUSANDS/ÂΜL (ref 0–0.1)
BASOPHILS NFR BLD AUTO: 1 % (ref 0–1)
BUN SERPL-MCNC: 11 MG/DL (ref 5–25)
CALCIUM SERPL-MCNC: 8.4 MG/DL (ref 8.4–10.2)
CHLORIDE SERPL-SCNC: 101 MMOL/L (ref 96–108)
CO2 SERPL-SCNC: 25 MMOL/L (ref 21–32)
CREAT SERPL-MCNC: 0.7 MG/DL (ref 0.6–1.3)
EOSINOPHIL # BLD AUTO: 0.16 THOUSAND/ÂΜL (ref 0–0.61)
EOSINOPHIL NFR BLD AUTO: 2 % (ref 0–6)
ERYTHROCYTE [DISTWIDTH] IN BLOOD BY AUTOMATED COUNT: 13.8 % (ref 11.6–15.1)
FERRITIN SERPL-MCNC: 992 NG/ML (ref 8–388)
GFR SERPL CREATININE-BSD FRML MDRD: 102 ML/MIN/1.73SQ M
GLUCOSE P FAST SERPL-MCNC: 216 MG/DL (ref 65–99)
GLUCOSE SERPL-MCNC: 141 MG/DL (ref 65–140)
GLUCOSE SERPL-MCNC: 191 MG/DL (ref 65–140)
GLUCOSE SERPL-MCNC: 207 MG/DL (ref 65–140)
GLUCOSE SERPL-MCNC: 216 MG/DL (ref 65–140)
GLUCOSE SERPL-MCNC: 227 MG/DL (ref 65–140)
HCT VFR BLD AUTO: 28.4 % (ref 36.5–49.3)
HGB BLD-MCNC: 9 G/DL (ref 12–17)
IMM GRANULOCYTES # BLD AUTO: 0.06 THOUSAND/UL (ref 0–0.2)
IMM GRANULOCYTES NFR BLD AUTO: 1 % (ref 0–2)
IRON SATN MFR SERPL: 21 % (ref 20–50)
IRON SERPL-MCNC: 47 UG/DL (ref 65–175)
LYMPHOCYTES # BLD AUTO: 1.88 THOUSANDS/ÂΜL (ref 0.6–4.47)
LYMPHOCYTES NFR BLD AUTO: 19 % (ref 14–44)
MCH RBC QN AUTO: 29.7 PG (ref 26.8–34.3)
MCHC RBC AUTO-ENTMCNC: 31.7 G/DL (ref 31.4–37.4)
MCV RBC AUTO: 94 FL (ref 82–98)
MONOCYTES # BLD AUTO: 0.97 THOUSAND/ÂΜL (ref 0.17–1.22)
MONOCYTES NFR BLD AUTO: 10 % (ref 4–12)
NEUTROPHILS # BLD AUTO: 6.63 THOUSANDS/ÂΜL (ref 1.85–7.62)
NEUTS SEG NFR BLD AUTO: 67 % (ref 43–75)
NRBC BLD AUTO-RTO: 0 /100 WBCS
PLATELET # BLD AUTO: 571 THOUSANDS/UL (ref 149–390)
PMV BLD AUTO: 10.2 FL (ref 8.9–12.7)
POTASSIUM SERPL-SCNC: 3.5 MMOL/L (ref 3.5–5.3)
RBC # BLD AUTO: 3.03 MILLION/UL (ref 3.88–5.62)
SODIUM SERPL-SCNC: 134 MMOL/L (ref 135–147)
TIBC SERPL-MCNC: 224 UG/DL (ref 250–450)
TSH SERPL DL<=0.05 MIU/L-ACNC: 26.56 UIU/ML (ref 0.45–4.5)
WBC # BLD AUTO: 9.75 THOUSAND/UL (ref 4.31–10.16)

## 2023-05-15 PROCEDURE — 97535 SELF CARE MNGMENT TRAINING: CPT

## 2023-05-15 PROCEDURE — 80048 BASIC METABOLIC PNL TOTAL CA: CPT | Performed by: NURSE PRACTITIONER

## 2023-05-15 PROCEDURE — 82948 REAGENT STRIP/BLOOD GLUCOSE: CPT

## 2023-05-15 PROCEDURE — 84443 ASSAY THYROID STIM HORMONE: CPT | Performed by: NURSE PRACTITIONER

## 2023-05-15 PROCEDURE — 97542 WHEELCHAIR MNGMENT TRAINING: CPT | Performed by: PHYSICAL THERAPIST

## 2023-05-15 PROCEDURE — 85025 COMPLETE CBC W/AUTO DIFF WBC: CPT | Performed by: NURSE PRACTITIONER

## 2023-05-15 PROCEDURE — 83540 ASSAY OF IRON: CPT | Performed by: NURSE PRACTITIONER

## 2023-05-15 PROCEDURE — 97110 THERAPEUTIC EXERCISES: CPT | Performed by: PHYSICAL THERAPIST

## 2023-05-15 PROCEDURE — 99232 SBSQ HOSP IP/OBS MODERATE 35: CPT | Performed by: FAMILY MEDICINE

## 2023-05-15 PROCEDURE — 97110 THERAPEUTIC EXERCISES: CPT

## 2023-05-15 PROCEDURE — 83550 IRON BINDING TEST: CPT | Performed by: NURSE PRACTITIONER

## 2023-05-15 PROCEDURE — 82728 ASSAY OF FERRITIN: CPT | Performed by: NURSE PRACTITIONER

## 2023-05-15 PROCEDURE — 97530 THERAPEUTIC ACTIVITIES: CPT | Performed by: PHYSICAL THERAPIST

## 2023-05-15 PROCEDURE — 97530 THERAPEUTIC ACTIVITIES: CPT

## 2023-05-15 RX ORDER — LANOLIN ALCOHOL/MO/W.PET/CERES
3 CREAM (GRAM) TOPICAL
Status: DISCONTINUED | OUTPATIENT
Start: 2023-05-15 | End: 2023-05-30 | Stop reason: HOSPADM

## 2023-05-15 RX ADMIN — TAMSULOSIN HYDROCHLORIDE 0.4 MG: 0.4 CAPSULE ORAL at 17:17

## 2023-05-15 RX ADMIN — ZOLPIDEM TARTRATE 10 MG: 5 TABLET ORAL at 21:12

## 2023-05-15 RX ADMIN — VERAPAMIL HYDROCHLORIDE 360 MG: 180 TABLET ORAL at 21:12

## 2023-05-15 RX ADMIN — INSULIN GLARGINE 20 UNITS: 100 INJECTION, SOLUTION SUBCUTANEOUS at 21:12

## 2023-05-15 RX ADMIN — INSULIN LISPRO 4 UNITS: 100 INJECTION, SOLUTION INTRAVENOUS; SUBCUTANEOUS at 12:06

## 2023-05-15 RX ADMIN — ASPIRIN 81 MG 81 MG: 81 TABLET ORAL at 08:16

## 2023-05-15 RX ADMIN — ATORVASTATIN CALCIUM 40 MG: 40 TABLET, FILM COATED ORAL at 08:16

## 2023-05-15 RX ADMIN — INSULIN LISPRO 4 UNITS: 100 INJECTION, SOLUTION INTRAVENOUS; SUBCUTANEOUS at 08:24

## 2023-05-15 RX ADMIN — INSULIN LISPRO 15 UNITS: 100 INJECTION, SOLUTION INTRAVENOUS; SUBCUTANEOUS at 08:24

## 2023-05-15 RX ADMIN — Medication 3 MG: at 21:12

## 2023-05-15 RX ADMIN — ACETAMINOPHEN 650 MG: 325 TABLET ORAL at 21:16

## 2023-05-15 RX ADMIN — FERROUS SULFATE TAB 325 MG (65 MG ELEMENTAL FE) 325 MG: 325 (65 FE) TAB at 08:16

## 2023-05-15 RX ADMIN — CLOPIDOGREL BISULFATE 75 MG: 75 TABLET ORAL at 08:16

## 2023-05-15 RX ADMIN — ENOXAPARIN SODIUM 40 MG: 40 INJECTION SUBCUTANEOUS at 05:46

## 2023-05-15 RX ADMIN — INSULIN LISPRO 15 UNITS: 100 INJECTION, SOLUTION INTRAVENOUS; SUBCUTANEOUS at 12:09

## 2023-05-15 RX ADMIN — PANTOPRAZOLE SODIUM 40 MG: 40 TABLET, DELAYED RELEASE ORAL at 05:46

## 2023-05-15 RX ADMIN — LEVOTHYROXINE SODIUM 150 MCG: 75 TABLET ORAL at 05:46

## 2023-05-15 RX ADMIN — INSULIN LISPRO 15 UNITS: 100 INJECTION, SOLUTION INTRAVENOUS; SUBCUTANEOUS at 17:19

## 2023-05-15 RX ADMIN — SENNOSIDES AND DOCUSATE SODIUM 1 TABLET: 50; 8.6 TABLET ORAL at 08:21

## 2023-05-15 RX ADMIN — INSULIN LISPRO 2 UNITS: 100 INJECTION, SOLUTION INTRAVENOUS; SUBCUTANEOUS at 17:17

## 2023-05-15 NOTE — PROGRESS NOTES
05/15/23 0644   Pain Assessment   Pain Assessment Tool 0-10   Pain Score No Pain   Restrictions/Precautions   Precautions Bed/chair alarms; Fall Risk  (R LE immobilizer, NWBing R LE)   Weight Bearing Restrictions Yes   RLE Weight Bearing Per Order NWB   Braces or Orthoses Knee immobilizer   Cognition   Overall Cognitive Status WFL   Orientation Level Oriented X4   Subjective   Subjective I worked hard this weekend in therapy, I'm exhausted and I didn't sleep well because of this new bed  Roll Left and Right   Type of Assistance Needed Independent   Physical Assistance Level No physical assistance   Roll Left and Right CARE Score 6   Sit to Lying   Type of Assistance Needed Independent   Physical Assistance Level No physical assistance   Sit to Lying CARE Score 6   Lying to Sitting on Side of Bed   Type of Assistance Needed Independent   Physical Assistance Level No physical assistance   Lying to Sitting on Side of Bed CARE Score 6   Sit to Stand   Comment Did not perform full sit to stand, performed sit pivot transfers   Bed-Chair Transfer   Type of Assistance Needed Set-up / clean-up; Verbal cues   Physical Assistance Level No physical assistance   Comment Sit pivot, transfer to/from wheelchair/bed  Assist to position wheelchair   Chair/Bed-to-Chair Transfer CARE Score 4   Car Transfer   Reason if not Attempted Environmental limitations   Car Transfer CARE Score 10   Walk 10 Feet   Reason if not Attempted Safety concerns   Walk 10 Feet CARE Score 88   Walk 50 Feet with Two Turns   Reason if not Attempted Safety concerns   Walk 50 Feet with Two Turns CARE Score 88   Walk 150 Feet   Reason if not Attempted Safety concerns   Walk 150 Feet CARE Score 88   Walking 10 Feet on Uneven Surfaces   Reason if not Attempted Safety concerns   Walking 10 Feet on Uneven Surfaces CARE Score 88   Ambulation   Does the patient walk? 0  No, and walking goal is not clinically indicated     Wheel 50 Feet with Two Turns   Type of Assistance Needed Supervision;Verbal cues   Physical Assistance Level No physical assistance   Comment Level surface in room, assist into/out of bathroom due to turning radius   Wheel 50 Feet with Two Turns CARE Score 4   Wheel 150 Feet   Comment Patient fatigued from weekend therapies, poor night of sleep last night   Wheelchair mobility   Does the patient use a wheelchair? 1  Yes   Type of Wheelchair Used 1  Manual   Method Right upper extremity; Left upper extremity   Curb or Single Stair   Reason if not Attempted Safety concerns   1 Step (Curb) CARE Score 88   4 Steps   Reason if not Attempted Safety concerns   4 Steps CARE Score 88   12 Steps   Reason if not Attempted Activity not applicable   12 Steps CARE Score 9   Toilet Transfer   Type of Assistance Needed Physical assistance   Physical Assistance Level 26%-50%   Comment Sit pivot wheelchair to/from toilet, grab bar in use, NWBing R LE   Toilet Transfer CARE Score 3   Therapeutic Interventions   Strengthening Supine LE TE   Other Comments   Comments Right KI in place   Assessment   Treatment Assessment Patient presents supine in bed, discouraged, intermittently teary during session  Evidently frustrated due to prolonged healing course  Fearful of returning home and injuring self and being a burden to caregivers  Patient is making slow and steady progress  Endurance remains limited as well as left LE strength given prolonged immobility  Patient is in need of ongoing interventions to maximize return to [facility] and allow for safe return home  Problem List Decreased strength;Decreased range of motion;Decreased endurance; Impaired balance;Orthopedic restrictions;Pain;Decreased safety awareness   PT Barriers   Physical Impairment Decreased strength;Decreased range of motion;Decreased endurance; Impaired balance;Orthopedic restrictions;Pain;Decreased safety awareness   Functional Limitation Wheelchair management; Walking;Transfers;Standing;Stair negotiation;Ramp negotiation;Car transfers   Plan   Treatment/Interventions ADL retraining;Functional transfer training;LE strengthening/ROM; Therapeutic exercise; Endurance training;Patient/family training;Equipment eval/education; Bed mobility   Progress Slow progress, decreased activity tolerance   PT Therapy Minutes   PT Time In 0644   PT Time Out 0815   PT Total Time (minutes) 91   PT Mode of treatment - Individual (minutes) 91   PT Mode of treatment - Concurrent (minutes) 0   PT Mode of treatment - Group (minutes) 0   PT Mode of treatment - Co-treat (minutes) 0   PT Mode of Treatment - Total time(minutes) 91 minutes   PT Cumulative Minutes 241     Patient remains in bed, set up for meal, all needs in reach  Alarm in place and activated  Encouraged use of call bell, patient verbalizes understanding

## 2023-05-15 NOTE — PCC OCCUPATIONAL THERAPY
5/16/23: Pt's current LOF listed above  Barriers to d/c include decreased strength throughout but especially RLE s/p tibia fx, NWB RLE with immobilizer AAT, decreased balance, pain in RLE, and decreased activity tolerance; all affect independence in self care and fxl transfers  Pt participating in ADL training, therapeutic activities/exercises, functional mobility/transfers, and activity tolerance in order to progress towards goals  Pt would benefit from continued skilled OT services in order to address listed barriers and prepare for safe d/c     5/22/23: Pt participates in ADLs, transfers/ mobility and BUE therex  Barriers to d/c include decreased strength throughout but especially RLE s/p tibia fx with improvements noted, NWB RLE with immobilizer AAT, decreased balance, pain in RLE, and decreased activity tolerance; all affect independence in self care and fxl transfers  Pt participating in ADL training, therapeutic activities/exercises, functional mobility/transfers, and activity tolerance in order to progress towards goals  Pt reports having one w/c and one transport chair at home  Pt would benefit from continued skilled OT services in order to address listed barriers and prepare for safe d/c     5/29/23: Pt participates in ADLs, transfers/ mobility and BUE therex  Barriers to d/c include decreased strength throughout but especially RLE s/p tibia fx with improvements noted, NWB RLE, decreased balance, pain in RLE, and decreased activity tolerance; all affect independence in self care and fxl transfers  Pt participating in ADL training, therapeutic activities/exercises, functional mobility/transfers, and activity tolerance in order to progress towards goals  Pt is progressing towards goals with current LOF as listed above  Pt would benefit from continued skilled OT services in order to address listed barriers and prepare for safe d/c  To home with wife   FT completed last week with concerns about the tub reviewed and recommend HHOT to assess tub accessibility and transfer before showering resumes  Discharge planned later this day with goals partially met

## 2023-05-15 NOTE — CASE MANAGEMENT
Per GABRIEL, post-op appt for tomorrow is to be kept in place; transport arranged for 12:45 PU tomorrow, see prior notes  Pt also has a conflicting appt with nephrology tomorrow; SW cancelled the nephro appt & [facility] medical staff will place an IP consult if necessary for that appt  Post-op appt remains in place as directed

## 2023-05-15 NOTE — NURSING NOTE
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

## 2023-05-15 NOTE — PLAN OF CARE
Problem: PAIN - ADULT  Goal: Verbalizes/displays adequate comfort level or baseline comfort level  Description: Interventions:  - Encourage patient to monitor pain and request assistance  - Assess pain using appropriate pain scale  - Administer analgesics based on type and severity of pain and evaluate response  - Implement non-pharmacological measures as appropriate and evaluate response  - Consider cultural and social influences on pain and pain management  - Notify physician/advanced practitioner if interventions unsuccessful or patient reports new pain  Outcome: Progressing     Problem: INFECTION - ADULT  Goal: Absence or prevention of progression during hospitalization  Description: INTERVENTIONS:  - Assess and monitor for signs and symptoms of infection  - Monitor lab/diagnostic results  - Administer medications as ordered  - Instruct and encourage patient and family to use good hand hygiene technique  - Identify and instruct in appropriate isolation precautions for identified infection/condition  Outcome: Progressing  Goal: Absence of fever/infection during neutropenic period  Description: INTERVENTIONS:  - Monitor WBC    Outcome: Progressing     Problem: SAFETY ADULT  Goal: Patient will remain free of falls  Description: INTERVENTIONS:  - Educate patient/family on patient safety including physical limitations  - Instruct patient to call for assistance with activity   - Consult OT/PT to assist with strengthening/mobility   - Keep Call bell within reach  - Keep bed low and locked with side rails adjusted as appropriate  - Keep care items and personal belongings within reach  - Initiate and maintain comfort rounds  - Make Fall Risk Sign visible to staff  - Apply yellow socks and bracelet for high fall risk patients  - Consider moving patient to room near nurses station  Outcome: Progressing  Goal: Maintain or return to baseline ADL function  Description: INTERVENTIONS:  -  Assess patient's ability to carry out ADLs; assess patient's baseline for ADL function and identify physical deficits which impact ability to perform ADLs (bathing, care of mouth/teeth, toileting, grooming, dressing, etc )  - Assess/evaluate cause of self-care deficits   - Assess range of motion  - Assess patient's mobility; develop plan if impaired  - Assess patient's need for assistive devices and provide as appropriate  - Encourage maximum independence but intervene and supervise when necessary  - Involve family in performance of ADLs  - Assess for home care needs following discharge   - Consider OT consult to assist with ADL evaluation and planning for discharge  - Provide patient education as appropriate  Outcome: Progressing  Goal: Maintains/Returns to pre admission functional level  Description: INTERVENTIONS:  - Set and communicate daily mobility goal to care team and patient/family/caregiver     - Collaborate with rehabilitation services on mobility goals if consulted  - Out of bed for toileting  - Record patient progress and toleration of activity level   Outcome: Progressing     Problem: DISCHARGE PLANNING  Goal: Discharge to home or other facility with appropriate resources  Description: INTERVENTIONS:  - Identify barriers to discharge w/patient and caregiver  - Arrange for needed discharge resources and transportation as appropriate  - Identify discharge learning needs (meds, wound care, etc )  - Arrange for interpretive services to assist at discharge as needed  - Refer to Case Management Department for coordinating discharge planning if the patient needs post-hospital services based on physician/advanced practitioner order or complex needs related to functional status, cognitive ability, or social support system  Outcome: Progressing     Problem: Potential for Falls  Goal: Patient will remain free of falls  Description: INTERVENTIONS:  - Educate patient/family on patient safety including physical limitations  - Instruct patient to call for assistance with activity   - Consult OT/PT to assist with strengthening/mobility   - Keep Call bell within reach  - Keep bed low and locked with side rails adjusted as appropriate  - Keep care items and personal belongings within reach  - Initiate and maintain comfort rounds  - Make Fall Risk Sign visible to staff  - Apply yellow socks and bracelet for high fall risk patients  - Consider moving patient to room near nurses station  Outcome: Progressing     Problem: Prexisting or High Potential for Compromised Skin Integrity  Goal: Skin integrity is maintained or improved  Description: INTERVENTIONS:  - Identify patients at risk for skin breakdown  - Assess and monitor skin integrity  - Assess and monitor nutrition and hydration status  - Monitor labs   - Assess for incontinence   - Turn and reposition patient  - Assist with mobility/ambulation  - Relieve pressure over bony prominences  - Avoid friction and shearing  - Provide appropriate hygiene as needed including keeping skin clean and dry  - Evaluate need for skin moisturizer/barrier cream  - Collaborate with interdisciplinary team   - Patient/family teaching  - Consider wound care consult   Outcome: Progressing     Problem: MOBILITY - ADULT  Goal: Maintain or return to baseline ADL function  Description: INTERVENTIONS:  -  Assess patient's ability to carry out ADLs; assess patient's baseline for ADL function and identify physical deficits which impact ability to perform ADLs (bathing, care of mouth/teeth, toileting, grooming, dressing, etc )  - Assess/evaluate cause of self-care deficits   - Assess range of motion  - Assess patient's mobility; develop plan if impaired  - Assess patient's need for assistive devices and provide as appropriate  - Encourage maximum independence but intervene and supervise when necessary  - Involve family in performance of ADLs  - Assess for home care needs following discharge   - Consider OT consult to assist with ADL evaluation and planning for discharge  - Provide patient education as appropriate  Outcome: Progressing  Goal: Maintains/Returns to pre admission functional level  Description: INTERVENTIONS:  - Set and communicate daily mobility goal to care team and patient/family/caregiver     - Collaborate with rehabilitation services on mobility goals if consulted  - Out of bed for toileting  - Record patient progress and toleration of activity level   Outcome: Progressing

## 2023-05-15 NOTE — PROGRESS NOTES
05/15/23 0900   Pain Assessment   Pain Assessment Tool 0-10   Pain Score No Pain   Restrictions/Precautions   Precautions Bed/chair alarms; Fall Risk   RLE Weight Bearing Per Order NWB   ROM Restrictions   (RLE immobilizer at all times)   Braces or Orthoses Knee immobilizer  (RLE)   Oral Hygiene   Type of Assistance Needed Set-up / clean-up   Physical Assistance Level No physical assistance   Oral Hygiene CARE Score 5   Grooming   Able To Initiate Tasks; Wash/Dry Face;Brush/Clean Teeth   Limitation Noted In Safety;Strength   Shower/Bathe Self   Type of Assistance Needed Physical assistance   Physical Assistance Level 25% or less   Comment assist for buttocks when sidelying in bed   Shower/Bathe Self CARE Score 3   Bathing   Assessed Bath Style Sponge Bath   Anticipated D/C Bath Style Sponge Bath; Shower   Able to Donna Bam No   Able to Wash/Rinse/Dry (body part) Left Arm;Right Arm;L Upper Leg;R Upper Leg;L Lower Leg/Foot;Chest;Abdomen;Perineal Area  (RLE covered in immobilizer)   Limitations Noted in Balance; Endurance;ROM;Safety;Strength   Positioning Seated;Supine   Upper Body Dressing   Type of Assistance Needed Set-up / clean-up   Physical Assistance Level No physical assistance   Upper Body Dressing CARE Score 5   Lower Body Dressing   Comment pt did not wish to change shorts this session   Putting On/Taking Off Footwear   Type of Assistance Needed Physical assistance   Physical Assistance Level 26%-50%   Comment assist to don R sock   Putting On/Taking Off Footwear CARE Score 3   Dressing/Undressing Clothing   Remove UB Clothes Pullover [address] LB Clothes Socks   Limitations Noted In Balance; Endurance; Safety;ROM;Strength   Positioning Sit Edge Of Bed   Sit to Lying   Type of Assistance Needed Independent   Physical Assistance Level No physical assistance   Sit to Lying CARE Score 6   Lying to Sitting on Side of Bed   Type of Assistance Needed Independent   Physical Assistance Level No physical assistance   Lying to Sitting on Side of Bed CARE Score 6   Bed-Chair Transfer   Type of Assistance Needed Physical assistance   Physical Assistance Level 25% or less   Comment sit pivot from EOB into w/c   Chair/Bed-to-Chair Transfer CARE Score 3   Transfer Bed/Chair/Wheelchair   Limitations Noted In Balance; Endurance;LE Strength   Exercise Tools   Other Exercise Tool 1 UE strengthening in bilat UE using 3# dumbbell, 2x15 or 3x10: elbow flexion/extension, protraction/retraction, internal/external rotation, shoulder elevation/depression   Cognition   Overall Cognitive Status WFL   Arousal/Participation Alert; Cooperative;Responsive   Attention Within functional limits   Orientation Level Oriented X4   Memory Within functional limits   Following Commands Follows all commands and directions without difficulty   Assessment   Treatment Assessment Pt agreeable to OT session this AM  Received lying supine in bed  ADL session completed; current LOF and details listd in respective sections  Pt is overall La bathing and footwear management, set up UB dressing and oral care; bed mobility and other fxl transfers overall La  Pt completed w/c mobility to OT room after ADL session with supervision to complete ROM/strength exercises with fair tolerance and frequent rest breaks  Pt tearful during session regarding fear of d/c too soon and being a burder to his family; OT provided emotional support as able and reassured pt he is improving and will not be d/c until it is safe  Pt continues with barriers to d/c of decreased strength throughout but especially RLE s/p tibia fx, decreased balance, WNB RLE with knee immobilizer, and decreased activity tolerance; all affect independence in self care and fxl transfers  Pt would benefit from continued skilled OT services in order to address listed barriers and prepare for safe d/c     Prognosis Good   Problem List Decreased strength;Decreased range of motion;Decreased endurance; Impaired balance;Orthopedic restrictions;Pain;Decreased safety awareness   Plan   Treatment/Interventions ADL retraining;Functional transfer training;LE strengthening/ROM; Therapeutic exercise; Endurance training;Patient/family training;Equipment eval/education; Compensatory technique education;OT   Progress Progressing toward goals   OT Therapy Minutes   OT Time In 0900   OT Time Out 1030   OT Total Time (minutes) 90   OT Mode of treatment - Individual (minutes) 90

## 2023-05-15 NOTE — PCC PHYSICAL THERAPY
5/16/2023:  Patient seen by [facility] PT, making slow but steady progress  Presents following closed of medial plateau of right tibia, with decreased ROM/strength, decreased balance and safety, decreased endurance, and pain  Patient MI bed mobility, S/CGA transfers with sit pivot transfer, N/A ambulation or negotiation of stairs  Wheelchair propulsion with S/min A for >150 feet but with multiple rest breaks  Patient would benefit from continued inpatient [facility] PT to increase function, safety, and increased independence in prep for safe d/c to home  5/23/2023:  Patient seen by [facility] PT, making slow but steady progress  Limited by endurance, frequent rest breaks  Ongoing NWBing status, drainage from knee  Presents following closed of medial plateau of right tibia, with decreased ROM/strength, decreased balance and safety, decreased endurance, and pain  Patient MI bed mobility, S to unaffected side, S/CGA/min A to affected side transfers with sit pivot transfer, N/A ambulation or negotiation of stairs  Wheelchair propulsion with MI/S for >150 feet but with multiple rest breaks  Able to perform pull to stand with mod A at parallel bars max time 70 seconds  Patient would benefit from continued inpatient [facility] PT to increase function, safety, and increased independence in prep for safe d/c to home  5/30/2023:  Patient seen by [facility] PT, making slow but steady progress  Limited by endurance, frequent rest breaks  Ongoing NWBing status, drainage from knee is lessened, but ongoing  Presents following closed of medial plateau of right tibia, with decreased ROM/strength, decreased balance and safety, decreased endurance, and pain  Patient now cleared for gentle AROM only, no PROM, no WBing R LE  Patient MI bed mobility, MI to unaffected side, MI/Cl S to affected side transfers with sit pivot transfer, N/A ambulation or negotiation of stairs  Wheelchair propulsion with MI for >150 feet but with multiple rest breaks  Able to perform pull to stand with mod A at parallel bars max time  seconds  Patient would benefit from continued inpatient [facility] PT to increase function, safety, and increased independence in prep for safe d/c to home

## 2023-05-15 NOTE — TELEPHONE ENCOUNTER
Good Afternoon,    Dr Sangeeta Yu patient of yours  Keya Benavides in [facility] acute re[facility] is calling the office to cancel patients appointment due to patient will be admitted  Just FYI   Appointment was canceled

## 2023-05-15 NOTE — PROGRESS NOTES
PM&R PROGRESS NOTE:  Billie Rizvi 59 y o  male MRN: [id number]  Unit/Bed#: -01 Encounter: [id number]        Rehabilitation Diagnosis: Impairment of mobility, safety and Activities of Daily Living (ADLs) due to Orthopedic Disorders:  08 9  Other Orthopedic Closed fracture of medial plataeu of right tibia    HPI: Billie Rizvi is a 59 y o  male with a PMH significant for CKD, Diabetes mellitus, GERD, HLD and HTN who presented to the [address] [place] [address] on 05/01 for surgical management of a previous tibial plateau fracture  Patient seen in April for the same and opted out of surgical intervention at that time  Now, presented with varus malalignment, instability of the joint and significant comminution and bone void  He was taken to the OR with Dr Ankit Martini on 05/01 and is s/p ORIF to the right tibial plateau  Ordered NWB status post-op for approximately 10-12 weeks with immobilizer in place for 2 weeks post-op  Post-op patient developed urinary retention  Urology consulted  Patient refused straight cath and was following urinary retention protocol to only get casey catheter insertion if needed  Nephrology consulted with CKD history, started on IV fluid resuscitation  Developed hyponatremia  Home HCTZ was held and IVF were stopped  Etiology determined to be SIADH secondary to HCTZ  Once HCTZ was stopped, sodium started to improve  Patient evaluated by PT/OT and deemed appropriate for post acute rehabilitation services  He was accepted to [facility] and arrived on 5/10/23  SUBJECTIVE: Patient seen and evaluated  Complaining of insomnia he is on 10 mg of Ambien we can add melatonin to this    ASSESSMENT: Stable, progressing      PLAN:    Rehabilitation  • Functional deficits: impaired mobility, self care, NWB RLE, pain RLE    • Continue current rehabilitation plan of care to maximize function      • Functional update:   o PT:  Roll L-R: Supervision  Sit-lying: cg   Lying to sitting on side of bed: supervision    o OT:  Oral Hygiene: giraldo cu   Showering/bathing: cg  UB dressing: giraldo cu   Putting on/taking off footwear: mod  toileting hygiene: giraldo cu    • Estimated Discharge: To be discussed in next week's meeting      Pain  • Tylenol 650 mg Q8H PRN for mild pain  • Robaxin 500 mg Q6H PRN for muscle spasms     DVT prophylaxis  • Lovenox sq inj 40 mg daily  • Refusing to wear SCDs     Bladder plan  • Continent    Bowel plan  • Continent  • Last BM 5/11/23  • Refusing stool softeners today  • Continue Senna daily HS, d/c Colace     Skin care  · Monitor skin daily  · Apply skin nourishing cream  · Reposition Q2H to offload pressure  · Elevate heels off bed  · Monitor incision site  · Dressings to be changed PRN with soilage and after showering   Apply ABD and ACE wrap   · Immobilizer to remain in place 2 weeks post op     Stage 3b chronic kidney disease [facility])  Assessment & Plan  Lab Results   Component Value Date    EGFR 107 05/11/2023    EGFR 106 05/09/2023    EGFR 103 05/07/2023    CREATININE 0 63 05/11/2023    CREATININE 0 64 05/09/2023    CREATININE 0 68 05/07/2023   · Monitor periodically  · Followed in acute care with Nephrology  · S/P IV Fluids  · HCTZ d/c in setting of hyponatremia     Type 2 diabetes mellitus with diabetic peripheral angiopathy without gangrene [facility])  Assessment & Plan  Lab Results   Component Value Date    HGBA1C 7 9 (H) 04/24/2023       Recent Labs     05/11/23  1628 05/11/23  1714 05/11/23 2010 05/12/23  0756   POCGLU 84 135 211* 178*       Blood Sugar Average: Last 72 hrs:  (P) 154   · Monitor accu cheks and adjust regimen as needed   · Continue Lantus 20 units daily HS, 15 units humalog TID AC, SSI  · Home: Lantus 30 units daily HS, Humalog 20 units TID AC  · Carb controlled diet     Acquired hypothyroidism  Assessment & Plan  · Continue Levothyroxine 150 mcg daily     Insomnia  Assessment & Plan  · Continue Ambien 10 mg HS PRN    PAD (peripheral artery disease) [facility])  Assessment & Plan  · Continue home Plavix 75 mg daily    Hyponatremia  Assessment & Plan  · Seen by Nephrology in acute care  · Etiology determined to be SIADH secondary to HCTZ  · HCTZ since stopped   · 134 on 5/11   · Will repeat in 1 week    Hyperlipidemia  Assessment & Plan  · Continue Lipitor 40 mg     Gastroesophageal reflux disease without esophagitis  Assessment & Plan  · Continue Protonix 40 mg daily  · Home: Prilosec 40 mg daily    Hypertension, essential  Assessment & Plan  · Monitor vitals  · Continue Verapamil 360 mg daily HS    * Closed fracture of medial plateau of right tibia  Assessment & Plan  · Complex history   · Arrived 5/1 for surgical intervention of ongoing right tibial plateau fracture  · Previously in SNF but found to have varus malalignment, instability of the joint, significant comminution and bone void  · S/p ORIF on 05/01 with Dr Alin Garay  · NWB for 10-12 weeks  · Immobilizer to remain in place for 2 weeks at all times   · Dressings may be changed as needed; reached out to Ortho to confirm type of dressing change  · For follow up with Dr Lola Flores on 5/16/23 at 1400  Transport arranged for 1240   · Acute chomprehensive interdisciplinary inpatient rehabilitation to include intensive skilled therapies as outlines with oversight and management by a rehabilitation Physician Assistant overseen by rehabilitation physician as well as inpatient rehabilitation nursing, case management and weekly interdisciplinary team meeting            Appreciate IM consultants medical co-management  Labs, medications, and imaging personally reviewed  ROS:  A ten point review of systems was completed and pertinent positives are listed in subjective section  All other systems reviewed were negative  Review of Systems   Constitutional: Negative  HENT: Negative  Respiratory: Negative  Negative for shortness of breath  Cardiovascular: Negative  Negative for chest pain     Gastrointestinal: "Negative  Negative for abdominal pain and constipation  Genitourinary: Negative  Negative for difficulty urinating  Musculoskeletal: Positive for myalgias  RLE - tolerable    Neurological: Negative  Psychiatric/Behavioral: Negative  OBJECTIVE:   /87 (BP Location: Right arm)   Pulse 80   Temp 98 4 °F (36 9 °C) (Temporal)   Resp 18   Ht 6' 4\" (1 93 m)   Wt 123 kg (271 lb 6 2 oz)   SpO2 94%   BMI 33 03 kg/m²     Physical Exam  Vitals reviewed  Constitutional:       General: He is not in acute distress  Appearance: He is not ill-appearing  HENT:      Head: Normocephalic and atraumatic  Eyes:      Pupils: Pupils are equal, round, and reactive to light  Cardiovascular:      Rate and Rhythm: Normal rate and regular rhythm  Pulses: Normal pulses  Heart sounds: Normal heart sounds  No murmur heard  Pulmonary:      Effort: Pulmonary effort is normal  No respiratory distress  Breath sounds: Normal breath sounds  Abdominal:      General: Bowel sounds are normal  There is no distension  Palpations: Abdomen is soft  Musculoskeletal:      Right lower leg: No edema  Left lower leg: No edema  Skin:     General: Skin is warm and dry  Comments: Left pre-tibial chronic wound  Pink with two open penelope  Inferior wound appears to be scabbing over, superior with beefy red skin formation  Left heel- diabetic foot wound, yellow tough skin    Neurological:      General: No focal deficit present  Mental Status: He is alert and oriented to person, place, and time     Psychiatric:         Mood and Affect: Mood normal          Behavior: Behavior normal           Lab Results   Component Value Date    WBC 9 75 05/15/2023    HGB 9 0 (L) 05/15/2023    HCT 28 4 (L) 05/15/2023    MCV 94 05/15/2023     (H) 05/15/2023     Lab Results   Component Value Date    SODIUM 134 (L) 05/15/2023    K 3 5 05/15/2023     05/15/2023    CO2 25 05/15/2023    BUN 11 " 05/15/2023    CREATININE 0 70 05/15/2023    GLUC 216 (H) 05/15/2023    CALCIUM 8 4 05/15/2023     Lab Results   Component Value Date    INR 1 04 08/04/2020    INR 0 99 11/15/2018    PROTIME 13 8 08/04/2020    PROTIME 13 0 11/15/2018           Current Facility-Administered Medications:   •  acetaminophen (TYLENOL) tablet 650 mg, 650 mg, Oral, Q8H PRN, KATE Castro-C, 650 mg at 05/13/23 2153  •  aspirin chewable tablet 81 mg, 81 mg, Oral, Daily, KATE Castro-C, 81 mg at 05/15/23 0816  •  atorvastatin (LIPITOR) tablet 40 mg, 40 mg, Oral, QAM, KATE Castro-C, 40 mg at 05/15/23 0816  •  clopidogrel (PLAVIX) tablet 75 mg, 75 mg, Oral, QAM, KATE Castro-C, 75 mg at 05/15/23 0816  •  enoxaparin (LOVENOX) subcutaneous injection 40 mg, 40 mg, Subcutaneous, Daily, Bernard Reed PA-C, 40 mg at 05/15/23 3785  •  ferrous sulfate tablet 325 mg, 325 mg, Oral, Daily With Breakfast, NAILA Murcia, 325 mg at 05/15/23 0816  •  insulin glargine (LANTUS) subcutaneous injection 20 Units 0 2 mL, 20 Units, Subcutaneous, HS, KATE Castro-C, 20 Units at 05/14/23 2105  •  insulin lispro (HumaLOG) 100 units/mL subcutaneous injection 15 Units, 15 Units, Subcutaneous, TID With Meals, GLORIA CastroC, 15 Units at 05/15/23 0824  •  insulin lispro (HumaLOG) 100 units/mL subcutaneous injection 2-12 Units, 2-12 Units, Subcutaneous, 4x Daily (AC & HS), 4 Units at 05/15/23 0824 **AND** Fingerstick Glucose (POCT), , , 4x Daily AC and at bedtime, KATE Castro-C  •  levothyroxine tablet 150 mcg, 150 mcg, Oral, Early Morning, KATE Castro-C, 150 mcg at 05/15/23 0546  •  magnesium hydroxide (MILK OF MAGNESIA) oral suspension 30 mL, 30 mL, Oral, Daily PRN, Bernard Reed PA-C  •  melatonin tablet 3 mg, 3 mg, Oral, HS, Surekha Perry MD  •  methocarbamol (ROBAXIN) tablet 500 mg, 500 mg, Oral, Q6H PRN, Bernard Reed PA-C  •  ondansetron (ZOFRAN-ODT) dispersible tablet 4 mg, 4 mg, Oral, Q6H PRN, Bernard Reed, PA-C  •  pantoprazole (PROTONIX) EC tablet 40 mg, 40 mg, Oral, Early Morning, Loretha Ringer, PA-C, 40 mg at 05/15/23 0546  •  senna-docusate sodium (SENOKOT S) 8 6-50 mg per tablet 1 tablet, 1 tablet, Oral, Daily, Loretha Ringer, PA-C, 1 tablet at 05/15/23 4537  •  tamsulosin (FLOMAX) capsule 0 4 mg, 0 4 mg, Oral, Daily With Deloris Beer, PA-C, 0 4 mg at 05/14/23 1710  •  verapamil (CALAN-SR) CR tablet 360 mg, 360 mg, Oral, HS, Loretha Ringer, PA-C, 360 mg at 05/14/23 2105  •  zolpidem (AMBIEN) tablet 10 mg, 10 mg, Oral, HS PRN, Loretha Ringer, PA-C, 10 mg at 05/14/23 2105    Past Medical History:   Diagnosis Date   • Broken internal right knee prosthesis ([facility] ) 01/2023   • Chronic kidney disease    • Colon polyp    • Constipation 03/09/2023   • Diabetes mellitus ([place] [address] )    • Disease of thyroid gland    • GERD (gastroesophageal reflux disease)    • HHS vs DKA 11/16/2018   • Hyperlipidemia    • Hypertension    • Thyroid cancer [facility])        Patient Active Problem List    Diagnosis Date Noted   • MANNY (obstructive sleep apnea) 05/01/2023   • Closed fracture of medial plateau of right tibia 04/13/2023   • Depression and anxiety 03/03/2023   • Acquired genu varum of right lower extremity 02/21/2023   • MARKELL (acute kidney injury) ([place] [address] ) 02/14/2023   • Chronic kidney disease-mineral and bone disorder 02/14/2023   • Closed fracture of right tibial plateau 69/70/0664   • [facility]  ) 11/15/2022   • Hyperkalemia 11/15/2022   • Alcoholism ([place] [address] ) 11/15/2022   • Dupuytren's contracture of right hand 06/07/2022   • Type 2 diabetes mellitus with diabetic peripheral angiopathy without gangrene ([facility] ) 02/07/2022   • Physical deconditioning 12/21/2021   • Acquired hypothyroidism 10/18/2021   • Acquired absence of right foot ([facility] ) 03/29/2021   • Persistent proteinuria 01/12/2021   • Abnormal EKG 12/03/2020   • Preoperative examination 12/03/2020   • Insomnia 12/03/2020   • Acute blood loss anemia 10/08/2020   • Hypomagnesemia 09/09/2020   • Urinary retention 09/08/2020   • Thyroid cancer ([facility] ) 08/20/2020   • PAD (peripheral artery disease) ([facility] ) 08/05/2020   • Hyponatremia 08/04/2020   • Health maintenance examination 07/06/2020   • Elevated liver enzymes 10/24/2019   • Colon polyps 09/12/2019   • Right-sided tinnitus 11/26/2018   • Hypertension, essential 09/24/2018   • Gastroesophageal reflux disease without esophagitis 09/24/2018   • Obesity, Class I, BMI 30 0-34 9 (see actual BMI) 03/25/2010   • Hyperlipidemia 12/17/2009   • History of nonadherence to medical treatment 02/20/2008          Eldon Wolfe MD    I have spent a total time of 35 minutes on 05/15/23 in caring for this patient including Instructions for management, Patient and family education, Impressions, Counseling / Coordination of care, Documenting in the medical record, Reviewing / ordering tests, medicine, procedures  , Obtaining or reviewing history   and Communicating with other healthcare professionals   ** Please Note:  voice to text software may have been used in the creation of this document   Although proof errors in transcription or interpretation are a potential of such software**

## 2023-05-16 ENCOUNTER — APPOINTMENT (OUTPATIENT)
Dept: RADIOLOGY | Facility: CLINIC | Age: 58
DRG: 464 | End: 2023-05-16
Payer: MEDICARE

## 2023-05-16 ENCOUNTER — OFFICE VISIT (OUTPATIENT)
Dept: OBGYN CLINIC | Facility: CLINIC | Age: 58
End: 2023-05-16

## 2023-05-16 VITALS — WEIGHT: 271 LBS | HEIGHT: 76 IN | BODY MASS INDEX: 33 KG/M2

## 2023-05-16 DIAGNOSIS — S82.131A RIGHT MEDIAL TIBIAL PLATEAU FRACTURE, CLOSED, INITIAL ENCOUNTER: ICD-10-CM

## 2023-05-16 DIAGNOSIS — S82.131P: Primary | ICD-10-CM

## 2023-05-16 LAB
GLUCOSE SERPL-MCNC: 147 MG/DL (ref 65–140)
GLUCOSE SERPL-MCNC: 184 MG/DL (ref 65–140)
GLUCOSE SERPL-MCNC: 206 MG/DL (ref 65–140)
GLUCOSE SERPL-MCNC: 225 MG/DL (ref 65–140)

## 2023-05-16 PROCEDURE — 97530 THERAPEUTIC ACTIVITIES: CPT | Performed by: PHYSICAL THERAPIST

## 2023-05-16 PROCEDURE — 97535 SELF CARE MNGMENT TRAINING: CPT

## 2023-05-16 PROCEDURE — 99222 1ST HOSP IP/OBS MODERATE 55: CPT | Performed by: STUDENT IN AN ORGANIZED HEALTH CARE EDUCATION/TRAINING PROGRAM

## 2023-05-16 PROCEDURE — 99232 SBSQ HOSP IP/OBS MODERATE 35: CPT | Performed by: PHYSICAL MEDICINE & REHABILITATION

## 2023-05-16 PROCEDURE — 73560 X-RAY EXAM OF KNEE 1 OR 2: CPT

## 2023-05-16 PROCEDURE — 82948 REAGENT STRIP/BLOOD GLUCOSE: CPT

## 2023-05-16 PROCEDURE — 0JBR0ZZ EXCISION OF LEFT FOOT SUBCUTANEOUS TISSUE AND FASCIA, OPEN APPROACH: ICD-10-PCS | Performed by: STUDENT IN AN ORGANIZED HEALTH CARE EDUCATION/TRAINING PROGRAM

## 2023-05-16 PROCEDURE — 97110 THERAPEUTIC EXERCISES: CPT | Performed by: PHYSICAL THERAPIST

## 2023-05-16 PROCEDURE — 97110 THERAPEUTIC EXERCISES: CPT

## 2023-05-16 PROCEDURE — 97542 WHEELCHAIR MNGMENT TRAINING: CPT | Performed by: PHYSICAL THERAPIST

## 2023-05-16 PROCEDURE — 97530 THERAPEUTIC ACTIVITIES: CPT

## 2023-05-16 PROCEDURE — 11042 DBRDMT SUBQ TIS 1ST 20SQCM/<: CPT | Performed by: STUDENT IN AN ORGANIZED HEALTH CARE EDUCATION/TRAINING PROGRAM

## 2023-05-16 RX ORDER — CEFAZOLIN SODIUM 2 G/50ML
2000 SOLUTION INTRAVENOUS EVERY 8 HOURS
Status: DISCONTINUED | OUTPATIENT
Start: 2023-05-16 | End: 2023-05-30 | Stop reason: HOSPADM

## 2023-05-16 RX ORDER — INSULIN GLARGINE 100 [IU]/ML
25 INJECTION, SOLUTION SUBCUTANEOUS
Status: DISCONTINUED | OUTPATIENT
Start: 2023-05-16 | End: 2023-05-30 | Stop reason: HOSPADM

## 2023-05-16 RX ADMIN — INSULIN LISPRO 4 UNITS: 100 INJECTION, SOLUTION INTRAVENOUS; SUBCUTANEOUS at 11:33

## 2023-05-16 RX ADMIN — ATORVASTATIN CALCIUM 40 MG: 40 TABLET, FILM COATED ORAL at 08:13

## 2023-05-16 RX ADMIN — PANTOPRAZOLE SODIUM 40 MG: 40 TABLET, DELAYED RELEASE ORAL at 06:01

## 2023-05-16 RX ADMIN — ACETAMINOPHEN 650 MG: 325 TABLET ORAL at 21:16

## 2023-05-16 RX ADMIN — INSULIN GLARGINE 25 UNITS: 100 INJECTION, SOLUTION SUBCUTANEOUS at 21:15

## 2023-05-16 RX ADMIN — INSULIN LISPRO 15 UNITS: 100 INJECTION, SOLUTION INTRAVENOUS; SUBCUTANEOUS at 16:41

## 2023-05-16 RX ADMIN — TAMSULOSIN HYDROCHLORIDE 0.4 MG: 0.4 CAPSULE ORAL at 16:41

## 2023-05-16 RX ADMIN — CEFAZOLIN SODIUM 2000 MG: 2 SOLUTION INTRAVENOUS at 20:52

## 2023-05-16 RX ADMIN — VERAPAMIL HYDROCHLORIDE 360 MG: 180 TABLET ORAL at 21:16

## 2023-05-16 RX ADMIN — CLOPIDOGREL BISULFATE 75 MG: 75 TABLET ORAL at 08:13

## 2023-05-16 RX ADMIN — FERROUS SULFATE TAB 325 MG (65 MG ELEMENTAL FE) 325 MG: 325 (65 FE) TAB at 08:13

## 2023-05-16 RX ADMIN — ASPIRIN 81 MG 81 MG: 81 TABLET ORAL at 08:13

## 2023-05-16 RX ADMIN — LEVOTHYROXINE SODIUM 150 MCG: 75 TABLET ORAL at 06:01

## 2023-05-16 RX ADMIN — ENOXAPARIN SODIUM 40 MG: 40 INJECTION SUBCUTANEOUS at 06:01

## 2023-05-16 RX ADMIN — INSULIN LISPRO 15 UNITS: 100 INJECTION, SOLUTION INTRAVENOUS; SUBCUTANEOUS at 11:33

## 2023-05-16 RX ADMIN — INSULIN LISPRO 2 UNITS: 100 INJECTION, SOLUTION INTRAVENOUS; SUBCUTANEOUS at 21:15

## 2023-05-16 RX ADMIN — ZOLPIDEM TARTRATE 10 MG: 5 TABLET ORAL at 21:16

## 2023-05-16 RX ADMIN — Medication 3 MG: at 21:16

## 2023-05-16 RX ADMIN — INSULIN LISPRO 15 UNITS: 100 INJECTION, SOLUTION INTRAVENOUS; SUBCUTANEOUS at 08:13

## 2023-05-16 RX ADMIN — INSULIN LISPRO 4 UNITS: 100 INJECTION, SOLUTION INTRAVENOUS; SUBCUTANEOUS at 08:12

## 2023-05-16 NOTE — TRANSPORTATION MEDICAL NECESSITY
"Section I - General Information    Name of Patient: Mike Up                 : 1965    Medicare #: [id number]  Transport Date: 23 (PCS is valid for round trips on this date and for all repetitive trips in the 60-day range as noted below )  Origin: [address] [place] [address]                                                         Destination: Dr Rigo Mccann  Is the pt's stay covered under Medicare Part A (PPS/DRG)   []     Closest appropriate facility? If no, why is transport to more distant facility required? Yes  If hospice pt, is this transport related to pt's terminal illness? NA       Section II - Medical Necessity Questionnaire  Ambulance transportation is medically necessary only if other means of transport are contraindicated or would be potentially harmful to the patient  To meet this requirement, the patient must either be \"bed confined\" or suffer from a condition such that transport by means other than ambulance is contraindicated by the patient's condition  The following questions must be answered by the medical professional signing below for this form to be valid:    1)  Describe the MEDICAL CONDITION (physical and/or mental) of this patient AT [address] [place] [address] that requires the patient to be transported in an ambulance and why transport by other means is contraindicated by the patient's condition: R Leg fracture, requires assistance & unable to ambulate    2) Is the patient \"bed confined\" as defined below? Yes  To be \"be confined\" the patient must satisfy all three of the following conditions: (1) unable to get up from bed without Assistance; AND (2) unable to ambulate; AND (3) unable to sit in a chair or wheelchair  3) Can this patient safely be transported by car or wheelchair van (i e , seated during transport without a medical attendant or monitoring)?    No    4) In addition to completing questions " 1-3 above, please check any of the following conditions that apply*:   *Note: supporting documentation for any boxes checked must be maintained in the patient's medical records  If hosp-hosp transfer, describe services needed at 2nd facility not available at 1st facility? Medical attendant required   Unable to tolerate seated position for time needed to transport       Section III - Signature of Physician or Healthcare Professional  I certify that the above information is true and correct based on my evaluation of this patient, and represent that the patient requires transport by ambulance and that other forms of transport are contraindicated  I understand that this information will be used by the Centers for Medicare and Medicaid Services (CMS) to support the determination of medical necessity for ambulance services, and I represent that I have personal knowledge of the patient's condition at time of transport  []  If this box is checked, I also certify that the patient is physically or mentally incapable of signing the ambulance service's claim and that the institution with which I am affiliated has furnished care, services, or assistance to the patient  My signature below is made on behalf of the patient pursuant to 42 CFR §424 36(b)(4)  In accordance with 42 CFR §424 37, the specific reason(s) that the patient is physically or mentally incapable of signing the claim form is as follows: Guero Shea of Physician* or Patrica MCDONALD  96 , MSW, LCSW    _____________________________________________________________  Signature Date 05/16/23 (For scheduled repetitive transports, this form is not valid for transports performed more than 60 days after this date)    Printed Name & Credentials of Physician or Healthcare Professional (MD, DO, RN, etc )________________________________  *Form must be signed by patient's attending physician for scheduled, repetitive transports   For non-repetitive, unscheduled ambulance transports, if unable to obtain the signature of the attending physician, any of the following may sign (choose appropriate option below)  [] Physician Assistant []  Clinical Nurse Specialist []  Registered Nurse  []  Nurse Practitioner  [x] Discharge Planner

## 2023-05-16 NOTE — TEAM CONFERENCE
Acute RehabilitationTeam Conference Note  Date: 5/16/2023   Time: 10:50 AM       Patient Name:  Adams Rosas       Medical Record Number: [id number]   YOB: 1965  Sex:  Male          Room/Bed:  [facility]-01  Payor Info:  Payor: BLUE CROSS / Plan: BC  [employer] BC PLAN [id number] / Product Type: Blue Fee for Service /      Admitting Diagnosis: Leg fracture, right [[id number] [id number]]   Admit Date/Time:  5/10/2023  4:11 PM  Admission Comments: No comment available     Primary Diagnosis:  Closed fracture of medial plateau of right tibia  Principal Problem: Closed fracture of medial plateau of right tibia    Patient Active Problem List    Diagnosis Date Noted   • MANNY (obstructive sleep apnea) 05/01/2023   • Closed fracture of medial plateau of right tibia 04/13/2023   • Depression and anxiety 03/03/2023   • Acquired genu varum of right lower extremity 02/21/2023   • [facility]) ([facility] ) 02/14/2023   • Chronic kidney disease-mineral and bone disorder 02/14/2023   • Closed fracture of right tibial plateau 95/29/5915   • Stage 3b chronic kidney disease ([facility] ) 11/15/2022   • Hyperkalemia 11/15/2022   • Alcoholism ([facility] ) 11/15/2022   • Dupuytren's contracture of right hand 06/07/2022   • Type 2 diabetes mellitus with diabetic peripheral angiopathy without gangrene ([facility] ) 02/07/2022   • Physical deconditioning 12/21/2021   • Acquired hypothyroidism 10/18/2021   • Acquired absence of right foot ([facility] ) 03/29/2021   • Persistent proteinuria 01/12/2021   • Abnormal EKG 12/03/2020   • Preoperative examination 12/03/2020   • Insomnia 12/03/2020   • Acute blood loss anemia 10/08/2020   • Hypomagnesemia 09/09/2020   • Urinary retention 09/08/2020   • Thyroid cancer ([facility] ) 08/20/2020   • PAD (peripheral artery disease) ([facility] ) 08/05/2020   • Hyponatremia 08/04/2020   • Health maintenance examination 07/06/2020   • Elevated liver enzymes 10/24/2019   • Colon polyps 09/12/2019   • Right-sided tinnitus 11/26/2018   • Hypertension, essential 09/24/2018   • Gastroesophageal reflux disease without esophagitis 09/24/2018   • Obesity, Class I, BMI 30 0-34 9 (see actual BMI) 03/25/2010   • Hyperlipidemia 12/17/2009   • History of nonadherence to medical treatment 02/20/2008       Physical Therapy:         5/16/2023:  Patient seen by ARU PT, making slow but steady progress  Presents following closed of medial plateau of right tibia, with decreased ROM/strength, decreased balance and safety, decreased endurance, and pain  Patient MI bed mobility, S/CGA transfers with sit pivot transfer, N/A ambulation or negotiation of stairs  Wheelchair propulsion with S/min A for >150 feet but with multiple rest breaks  Patient would benefit from continued inpatient ARC PT to increase function, safety, and increased independence in prep for safe d/c to home  Occupational Therapy:  Eating: Independent  Grooming: Supervision  Bathing: Minimal Assistance  Bathing: Minimal Assistance  Upper Body Dressing: Supervision  Lower Body Dressing: Minimal Assistance  Toileting: Minimal Assistance  Tub/Shower Transfer: Moderate Assistance  Toilet Transfer: Minimal Assistance  Cognition: Within Defined Limits  Orientation: Person, Place, Time, Situation  Discharge Recommendations: Home with:  [address] with[de-identified] Family Support, First Floor Setup, Home Occupational Therapy       5/16/23: Pt's current LOF listed above  Barriers to d/c include decreased strength throughout but especially RLE s/p tibia fx, NWB RLE with immobilizer AAT, decreased balance, pain in RLE, and decreased activity tolerance; all affect independence in self care and fxl transfers  Pt participating in ADL training, therapeutic activities/exercises, functional mobility/transfers, and activity tolerance in order to progress towards goals   Pt would benefit from continued skilled OT services in order to address listed barriers and prepare for safe d/c       Speech Therapy:           No notes "on file    Nursing Notes:  Appetite: Good  Diet Type: Diabetic                      Diet Patient/Family Education Complete: Yes    Type of Wound (LDA): Wound                    Type of Wound Patient/Family Education: Yes (Surgical incision RLE, Abrasion LLE, Scab Left foot)  Bladder: Continent (uses urinal independently)     Bladder Patient/Family Education: Yes  Bowel: Continent     Bowel Patient/Family Education: Yes  Pain Location/Orientation: Orientation: Right, Location: Leg  Pain Score: 4                       Hospital Pain Intervention(s): Repositioned  Pain Patient/Family Education: Yes  Medication Management/Safety  Injectable: Lovenox  Safe Administration: No  Reason for non-safe administration: not attempted  Medication Patient/Family Education Complete: Yes    5/16/2023 Pt admitted 5-10-23 After ORIF Right Tibia Plateau  A/O x 4 , Heart -reg, lungs clear with an occ  Dry cough  Abdomen soft  Continent of B & B , Using urinal at night time , otherwise uses toilet  Pt  Is NWB to RLE  Pt does have wound to left foot for which podiatry has been consulted and being followed by wound care who are to re-evaluate on Wed  RLE with incisions 2+ edema with some drainage has F/U appointment with Ortho this afternoon  At present time has knee immobilizer for his RLE  Case Management:     Discharge Planning  Living Arrangements: Lives w/ Spouse/significant other  Support Systems: Spouse/significant other  Assistance Needed: none  Type of Current Residence: Private residence  Current Home Care Services: No  Initial assessment & orientation to [facility] with Pt, and phone contact with Pt's wife, who expressed understanding & agreement  Pt resides in a SS home with his wife, who is retired & drives  There are 3 steps in, which is Pt's self-ststed primary goal of rehab \"to do 3 stairs so I can get in the bathroom\"  The stairs to enter the residence have planks on them to serve as a ramp   Pt expressed that he is pleased to " "be in this facility, as he has had negative experiences in other rehabs out of state  Provided him with a supportive outlet & provided encouragement  Mood was appropriate for situation  Discussed role of team members & reviewed [address] with Pt & Pt's spouse, who expressed understanding & agreement  See UR section for insurance details; Pt was initially denied & needed a peer to peer to be admitted into ARC; Pt & spouse both expressing that \"2 weeks won't be enough\"; discussed taking a day by day approach  SW will continue to monitor & assist as needed with [address]      Is the patient actively participating in therapies? yes  List any modifications to the treatment plan: na     Barriers Interventions   Elevated TSH/newly diagnosis hypothyroidism, HTN, DM Medical management and oversight, reqs insulin, patient education   Left diabetic ulcer, incision right knee Local care   Decreased ROM and strength Therapeutic exercise, therapeutic activity   Decreased balance, end ADL, transfer, gait training   NWB right LE Immobilizer, ADL, transfer, gait training     Is the patient making expected progress toward goals?  yes  List any update or changes to goals: na    Medical Goals: Patient will be able to manage medical conditions and comorbid conditions with medications and follow up upon completion of rehab program    Weekly Team Goals:   Rehab Team Goals  ADL Team Goal: Patient will require supervision with ADLs with least restrictive device upon completion of rehab program  Transfer Team Goal: Patient will be independent with transfers with least restrictive device upon completion of rehab program  Locomotion Team Goal: Patient will be independent with locomotion with least restrictive device upon completion of rehab program     Mod I bed mobility, transfers, wc mobility  S bathing, dressing  Mod I toileting     Health and wellness: to be able to return home and sit outside, be able to return to driving " Discussion: Plan for return home with significant other with [facility] for PT, OT, and nursing     Anticipated Discharge Date:  May 26, 2023

## 2023-05-16 NOTE — PROGRESS NOTES
PM&R PROGRESS NOTE:  Tamara Chang 59 y o  male MRN: [id number]  Unit/Bed#: -01 Encounter: [id number]        Rehabilitation Diagnosis: Impairment of mobility, safety and Activities of Daily Living (ADLs) due to Orthopedic Disorders:  08 9  Other Orthopedic Closed fracture of medial plataeu of right tibia    HPI: Tamara Chang is a 59 y o  male with a PMH significant for CKD, Diabetes mellitus, GERD, HLD and HTN who presented to the [facility] on 05/01 for surgical management of a previous tibial plateau fracture  Patient seen in April for the same and opted out of surgical intervention at that time  Now, presented with varus malalignment, instability of the joint and significant comminution and bone void  He was taken to the OR with Dr Sharon Werner on 05/01 and is s/p ORIF to the right tibial plateau  Ordered NWB status post-op for approximately 10-12 weeks with immobilizer in place for 2 weeks post-op  Post-op patient developed urinary retention  Urology consulted  Patient refused straight cath and was following urinary retention protocol to only get casey catheter insertion if needed  Nephrology consulted with CKD history, started on IV fluid resuscitation  Developed hyponatremia  Home HCTZ was held and IVF were stopped  Etiology determined to be SIADH secondary to HCTZ  Once HCTZ was stopped, sodium started to improve  Patient evaluated by PT/OT and deemed appropriate for post acute rehabilitation services  He was accepted to [facility] and arrived on 5/10/23  SUBJECTIVE: Patient seen and evaluated  Updated patient on labs and increasing Synthroid in setting of still elevated TSH  Patient admits to worsening fatigue  Did inform patient of iron deficiency anemia panel results  Will have IM evaluate and make adjustments as indicated  Will repeat labs in AM   Does have follow up Ortho farzad at 1400  Will follow for further recommendations       ASSESSMENT: Stable, progressing      PLAN:    Rehabilitation  • Functional deficits: impaired mobility, self care, NWB RLE, pain RLE    • Continue current rehabilitation plan of care to maximize function  • Functional update:   o PT:  Updated below  o OT:  Updated below    Physical Therapy Occupational Therapy Speech Therapy     Roll L-R: independent   Sit-lying: independent   Lying to sitting on side of bed: independent   Bed-chair transfer: supervision   Ambulation: safety concerns   Stairs: safety concerns     Eating: Independent  Grooming: Supervision  Bathing: Minimal Assistance  Bathing: Minimal Assistance  Upper Body Dressing: Supervision  Lower Body Dressing: Minimal Assistance  Toileting: Minimal Assistance  Tub/Shower Transfer: Moderate Assistance  Toilet Transfer: Minimal Assistance  Cognition: Within Defined Limits  Orientation: Person, Place, Time, Situation                   This patient was discussed by the Interdisciplinary Team in weekly case conference today  The care of the patient was extensively discussed with all care providers and an appropriate rehabilitation plan was formulated unique for this patient  Barriers were identified preventing progression of therapy and appropriate interventions were discussed with each discipline  Please see the team note for input from all disciplines regarding barriers, intervention, and discharge planning  • Estimated Discharge: 5/26/23 with Sudheer 78 PT/OT/N  • Patient currently with fatigue  Has been experiencing a low hgb of 9 0, Iron deficiency anemia  Ferritin at 992, TIBC 224, iron 47  Adjustments likely to be made to Ferrous Sulfate  Also noted to have elevated TSH of 26 562, adjustments made today to Synthroid of 150 mcg to 175 mcg  • Fatigue has been causing a hindrance on patient's progress  He has be unable to safely perform stairs while in ARC so far  He has 3 steps to enter his home that he must do especially for outpatient follow up needs     • As of right now, the patient cannot safely discharge home  There are concerns for a readmission if patient is to leave on Saturday 5/20  • Will need follow up orthopedic recommendations from today's appointment at 1400  Pain  • Tylenol 650 mg Q8H PRN for mild pain  • Robaxin 500 mg Q6H PRN for muscle spasms     DVT prophylaxis  • Lovenox sq inj 40 mg daily  • Refusing to wear SCDs     Bladder plan  • Continent     Bowel plan  • Continent   • Last BM 5/15/23  • Refusing stool softeners today  • Continue Senna daily HS, d/c Colace     Skin care  · Monitor skin daily  · Apply skin nourishing cream  · Reposition Q2H to offload pressure  · Elevate heels off bed  · Monitor incision site  · Dressings to be changed PRN with soilage and after showering  Apply ABD and ACE wrap   · Immobilizer to remain in place 2 weeks post op   · For follow up ortho appointment today  To determine updated recommendations with Immobilizer today       Stage 3b chronic kidney disease [facility])  Assessment & Plan  Lab Results   Component Value Date    EGFR 102 05/15/2023    EGFR 107 05/11/2023    EGFR 106 05/09/2023    CREATININE 0 70 05/15/2023    CREATININE 0 63 05/11/2023    CREATININE 0 64 05/09/2023   · Monitor periodically  · Followed in acute care with Nephrology  · S/P IV Fluids  · HCTZ d/c in setting of hyponatremia     Type 2 diabetes mellitus with diabetic peripheral angiopathy without gangrene [facility])  Assessment & Plan  Lab Results   Component Value Date    HGBA1C 7 9 (H) 04/24/2023       Recent Labs     05/15/23  1154 05/15/23  1626 05/15/23  2032 05/16/23  0713   POCGLU 207* 191* 141* 206*       Blood Sugar Average: Last 72 hrs:  (P) 354 [id number]   · Monitor accu cheks and adjust regimen as needed   · Continue Lantus 20 units daily HS, 15 units humalog TID AC, SSI  · Will increase Lantus to 25 units HS starting today  · Home: Lantus 30 units daily HS, Humalog 20 units TID AC  · Carb controlled diet     Acquired hypothyroidism  Assessment & Plan  · Levothyroxine 150 mcg daily increased to 175 mcg in setting of elevated TSH  · 11/14- 35 400, 5/15- 26 562  · Will need repeat TSH at next PCP follow up      Insomnia  Assessment & Plan  · Continue Ambien 10 mg HS PRN  · Melatonin added HS with ongoing insomnia     PAD (peripheral artery disease) ([facility])  Assessment & Plan  · Continue home Plavix 75 mg daily    Hyponatremia  Assessment & Plan  · Seen by Nephrology in acute care  · Etiology determined to be SIADH secondary to HCTZ  · HCTZ since stopped   · 134 on 5/15- stable since 5/11  · Will repeat tomorrow    Hyperlipidemia  Assessment & Plan  · Continue Lipitor 40 mg     Gastroesophageal reflux disease without esophagitis  Assessment & Plan  · Continue Protonix 40 mg daily  · Home: Prilosec 40 mg daily    Hypertension, essential  Assessment & Plan  · Monitor vitals  · Continue Verapamil 360 mg daily HS    * Closed fracture of medial plateau of right tibia  Assessment & Plan  · Complex history   · Arrived 5/1 for surgical intervention of ongoing right tibial plateau fracture  · Previously in SNF but found to have varus malalignment, instability of the joint, significant comminution and bone void  · S/p ORIF on 05/01 with Dr Donna Lobato  · NWB for 10-12 weeks  · Immobilizer to remain in place for 2 weeks at all times   · Dressings may be changed as needed; reached out to Ortho to confirm type of dressing change  · For follow up with Dr Amy Castaneda on 5/16/23 at 1400   Transport arranged for 1240   · To determine Lovenox therapy length  · To determine further immobilizer recommendations today   · Acute chomprehensive interdisciplinary inpatient rehabilitation to include intensive skilled therapies as outlines with oversight and management by a rehabilitation Physician Assistant overseen by rehabilitation physician as well as inpatient rehabilitation nursing, case management and weekly interdisciplinary team meeting            Jose Raul PACK "consultants medical co-management  Labs, medications, and imaging personally reviewed  ROS:  A ten point review of systems was completed and pertinent positives are listed in subjective section  All other systems reviewed were negative  Review of Systems   Constitutional: Positive for fatigue  HENT: Negative  Respiratory: Negative  Negative for shortness of breath  Cardiovascular: Negative  Negative for chest pain  Gastrointestinal: Negative  Negative for abdominal pain and constipation  Genitourinary: Negative  Negative for difficulty urinating  Musculoskeletal: Positive for myalgias  Pain is tolerable to RLE  Has only required Tylenol for pain management   Neurological: Negative  Psychiatric/Behavioral: Negative  OBJECTIVE:   /60 (BP Location: Right arm)   Pulse 74   Temp 98 °F (36 7 °C) (Temporal)   Resp 18   Ht 6' 4\" (1 93 m)   Wt 123 kg (271 lb 6 2 oz)   SpO2 98%   BMI 33 03 kg/m²     Physical Exam  Vitals reviewed  Constitutional:       General: He is not in acute distress  Appearance: He is not ill-appearing  HENT:      Head: Normocephalic and atraumatic  Eyes:      Pupils: Pupils are equal, round, and reactive to light  Cardiovascular:      Rate and Rhythm: Normal rate and regular rhythm  Pulses: Normal pulses  Heart sounds: Normal heart sounds  No murmur heard  Pulmonary:      Effort: Pulmonary effort is normal  No respiratory distress  Breath sounds: Normal breath sounds  Abdominal:      General: Bowel sounds are normal  There is no distension  Palpations: Abdomen is soft  Skin:     General: Skin is warm and dry  Comments: Dressings intact RLE   Neurological:      General: No focal deficit present  Mental Status: He is alert and oriented to person, place, and time     Psychiatric:         Mood and Affect: Mood normal          Behavior: Behavior normal           Lab Results   Component Value Date    WBC " 9 75 05/15/2023    HGB 9 0 (L) 05/15/2023    HCT 28 4 (L) 05/15/2023    MCV 94 05/15/2023     (H) 05/15/2023     Lab Results   Component Value Date    SODIUM 134 (L) 05/15/2023    K 3 5 05/15/2023     05/15/2023    CO2 25 05/15/2023    BUN 11 05/15/2023    CREATININE 0 70 05/15/2023    GLUC 216 (H) 05/15/2023    CALCIUM 8 4 05/15/2023     Lab Results   Component Value Date    INR 1 04 08/04/2020    INR 0 99 11/15/2018    PROTIME 13 8 08/04/2020    PROTIME 13 0 11/15/2018           Current Facility-Administered Medications:   •  acetaminophen (TYLENOL) tablet 650 mg, 650 mg, Oral, Q8H PRN, Lee Ann José, PA-C, 650 mg at 05/15/23 2116  •  aspirin chewable tablet 81 mg, 81 mg, Oral, Daily, KATE Vallejo-C, 81 mg at 05/16/23 0813  •  atorvastatin (LIPITOR) tablet 40 mg, 40 mg, Oral, QAM, Lee Ann José, PA-C, 40 mg at 05/16/23 0813  •  clopidogrel (PLAVIX) tablet 75 mg, 75 mg, Oral, QAM, Lee Ann José, PA-C, 75 mg at 05/16/23 0813  •  enoxaparin (LOVENOX) subcutaneous injection 40 mg, 40 mg, Subcutaneous, Daily, KATE Vallejo-C, 40 mg at 05/16/23 0601  •  ferrous sulfate tablet 325 mg, 325 mg, Oral, Daily With Breakfast, NAILA Melchor, 325 mg at 05/16/23 0813  •  insulin glargine (LANTUS) subcutaneous injection 25 Units 0 25 mL, 25 Units, Subcutaneous, HS, Lee Ann José PA-C  •  insulin lispro (HumaLOG) 100 units/mL subcutaneous injection 15 Units, 15 Units, Subcutaneous, TID With Meals, Lee Ann José PA-C, 15 Units at 05/16/23 1133  •  insulin lispro (HumaLOG) 100 units/mL subcutaneous injection 2-12 Units, 2-12 Units, Subcutaneous, 4x Daily (AC & HS), 4 Units at 05/16/23 1133 **AND** Fingerstick Glucose (POCT), , , 4x Daily AC and at bedtime, Lee Ann José PA-C  •  [START ON 5/17/2023] levothyroxine tablet 175 mcg, 175 mcg, Oral, Early Morning, Lee Ann José PA-C  •  magnesium hydroxide (MILK OF MAGNESIA) oral suspension 30 mL, 30 mL, Oral, Daily PRN, Lee Ann José PA-C  •  melatonin tablet 3 mg, 3 mg, Oral, HS, Lucero Jackson MD, 3 mg at 05/15/23 2112  •  methocarbamol (ROBAXIN) tablet 500 mg, 500 mg, Oral, Q6H PRN, Tristian Asa, PA-C  •  ondansetron (ZOFRAN-ODT) dispersible tablet 4 mg, 4 mg, Oral, Q6H PRN, Tristian Asa, PA-C  •  pantoprazole (PROTONIX) EC tablet 40 mg, 40 mg, Oral, Early Morning, [place] Asa, PA-C, 40 mg at 05/16/23 0601  •  senna-docusate sodium (SENOKOT S) 8 6-50 mg per tablet 1 tablet, 1 tablet, Oral, Daily, [place] Asa, PA-C, 1 tablet at 05/15/23 4212  •  tamsulosin (FLOMAX) capsule 0 4 mg, 0 4 mg, Oral, Daily With Kristi Spain, PA-C, 0 4 mg at 05/15/23 1717  •  verapamil (CALAN-SR) CR tablet 360 mg, 360 mg, Oral, HS, [place] Asa, PA-C, 360 mg at 05/15/23 2112  •  zolpidem (AMBIEN) tablet 10 mg, 10 mg, Oral, HS PRN, [place] Asa, PA-C, 10 mg at 05/15/23 2112    Past Medical History:   Diagnosis Date   • Broken internal right knee [facility] ([facility] ) 01/2023   • Chronic kidney disease    • Colon polyp    • Constipation 03/09/2023   • Diabetes mellitus ([facility] )    • Disease of thyroid gland    • GERD (gastroesophageal reflux disease)    • HHS vs DKA 11/16/2018   • Hyperlipidemia    • Hypertension    • Thyroid cancer [facility])        Patient Active Problem List    Diagnosis Date Noted   • MANNY (obstructive sleep apnea) 05/01/2023   • Closed fracture of medial plateau of right tibia 04/13/2023   • Depression and anxiety 03/03/2023   • Acquired genu varum of right lower extremity 02/21/2023   • MARKELL (acute kidney injury) ([facility] ) 02/14/2023   • Chronic kidney disease-mineral and bone disorder 02/14/2023   • Closed fracture of right tibial plateau 23/99/0648   • Stage 3b chronic kidney disease ([place] [address] ) 11/15/2022   • Hyperkalemia 11/15/2022   • Alcoholism ([place] [address] ) 11/15/2022   • Dupuytren's contracture of right hand 06/07/2022   • Type 2 diabetes mellitus with diabetic peripheral angiopathy without gangrene ([place] [address] ) 02/07/2022   • Physical deconditioning 12/21/2021   • Acquired hypothyroidism 10/18/2021   • Acquired absence of right foot ([facility]) 03/29/2021   • Persistent proteinuria 01/12/2021   • Abnormal EKG 12/03/2020   • Preoperative examination 12/03/2020   • Insomnia 12/03/2020   • Acute blood loss anemia 10/08/2020   • Hypomagnesemia 09/09/2020   • Urinary retention 09/08/2020   • Thyroid cancer ([facility]) 08/20/2020   • PAD (peripheral artery disease) ([facility]) 08/05/2020   • Hyponatremia 08/04/2020   • Health maintenance examination 07/06/2020   • Elevated liver enzymes 10/24/2019   • Colon polyps 09/12/2019   • Right-sided tinnitus 11/26/2018   • Hypertension, essential 09/24/2018   • Gastroesophageal reflux disease without esophagitis 09/24/2018   • Obesity, Class I, BMI 30 0-34 9 (see actual BMI) 03/25/2010   • Hyperlipidemia 12/17/2009   • History of nonadherence to medical treatment 02/20/2008          Mina Shone, PA-C    I have spent a total time of 45 minutes on 05/16/23 in caring for this patient including Diagnostic results, Risks and benefits of tx options, Instructions for management, Patient and family education, Impressions, Counseling / Coordination of care, Documenting in the medical record, Reviewing / ordering tests, medicine, procedures  , Obtaining or reviewing history   and Communicating with other healthcare professionals   ** Please Note:  voice to text software may have been used in the creation of this document   Although proof errors in transcription or interpretation are a potential of such software**

## 2023-05-16 NOTE — [facility] NURSING
5/16/2023 Pt admitted 5-10-23 After ORIF Right Tibia Plateau  A/O x 4 , Heart -reg, lungs clear with an occ  Dry cough  Abdomen soft  Continent of B & B , Using urinal at night time , otherwise uses toilet  Pt  Is NWB to RLE  Pt does have wound to left foot for which podiatry has been consulted and being followed by wound care who are to re-evaluate on Wed  RLE with incisions 2+ edema with some drainage has F/U appointment with Ortho this afternoon  At present time has knee immobilizer for his RLE      5/22/23 Patient remains alert and oriented  Lungs are clear on room air  Patient is continent of bladder with urinal usage  Last bowel movement of 5/18 pt now receiving senna-s to help with bowel movements  Left foot wound with daily dressing changes completed by nursing  Right knee incisions with sutures and steri strips in place draining large amounts of serousanguenous drainage with twice daily dressing changes completed by nursing  Edema noted to the right knee  Patient no longer using a knee immobilizer  Patient receiving IV ancef every 8 hours for cellulitis following ortho follow up appointment  Blood sugars are being checked four times a day and insulin administered as required  Pt states he was self injecting insulin with and insulin pen prior to admission  Patient to maintain non-weight bearing status to the right lower extremity at all times

## 2023-05-16 NOTE — NURSING NOTE
Patient AA&Ox 4  Right Leg immobilizer in place  Dressing to Right knee incisions changed  Per MD order  Small amount serosanguinous drainage noted from medial incision  Dr Laz Cam in to eval left foot wound  Debrided wound at bedside  Xeroform dressing and David applied to same  Participated in therpay sessions as scheduled and tolerated same well  Transported out to Ortho appointment in [facility] at this time

## 2023-05-16 NOTE — PROGRESS NOTES
Orthopaedics Office Visit - 1st Patient Visit    ASSESSMENT/PLAN:    Assessment:    2 weeks s/p ORIF right tibial plateau ORIF nonunion  DOS 5/1/23   Superficial cellulitis right lower extremity , medial wound drainage  Swelling of leg  Uncontrolled DM II, PVD       Plan:   - Continue non-weight bearing Right lower extremity   - Discontinue knee immobilizer at this time   - Sutures will remain in place until otherwise stated   - Continue with physical therapy : gait training, ambulation assistance     - Cleared for gentle ROM of the knee - ok for AROM, no PROM at this time  - Recommend IV ABX for the right lower extremity   - Over the counter analgesics as needed / directed   - Ice / heat as directed   - Discussed possibility of surgical intervention in the future pending symptoms    - Incision and drainage right lower extremity   - Follow up 4 weeks with repeat XR   - Will coordinate care with Rehab Facility       To Do Next Visit:  XR right knee     _____________________________________________________  CHIEF COMPLAINT:  Chief Complaint   Patient presents with   • Right Knee - Post-op         SUBJECTIVE:  Marie Jimenez is a 59 y o  male presenting to the office for a follow up for his right knee  Patient is 2 weeks s/p ORIF right tibial plateau ORIF nonunion  Patient states that his leg is doing well overall  Patient states he has minimal pain in the leg currently  Patient states he has been compliant with nonweightbearing restrictions of the right lower extremity  Patient states he has not attempted weightbearing on the left lower extremity  Patient is currently at an acute rehab facility  Patient has been taking Plavix and Lovenox as directed  Patient states that he has been noticing some redness in the leg recently  Patient denies any fevers or chills, Patient denies any SOB, Chest pain  Patient offers no other complaints at this time      PAST MEDICAL HISTORY:  Past Medical History:   Diagnosis Date • Broken internal right [facility] ) 01/2023   • Chronic kidney disease    • Colon polyp    • Constipation 03/09/2023   • Diabetes mellitus ([facility] )    • Disease of thyroid gland    • GERD (gastroesophageal reflux disease)    • HHS vs DKA 11/16/2018   • Hyperlipidemia    • Hypertension    • Thyroid cancer ([facility] )        PAST SURGICAL HISTORY:  Past Surgical History:   Procedure Laterality Date   • COLONOSCOPY     • FOOT AMPUTATION Right    • FOOT SURGERY Right 2014   • IR AORTAGRAM WITH RUN-OFF  08/06/2020   • IR TUNNELED CENTRAL LINE PLACEMENT  09/08/2020   • IR TUNNELED CENTRAL LINE REMOVAL  09/28/2020   • IN AMPUTATION FOOT TRANSMETARSAL Right 09/03/2020    Procedure: AMPUTATION TRANSMETATARSAL (TMA);  Surgeon: Gail Osuna DPM;  [facility]: [facility];  Service: Podiatry   • IN OPEN [place] UNICONDYLAR Right 5/1/2023    Procedure: OPEN REDUCTION W/ INTERNAL FIXATION (ORIF) RIGHT TIBIAL PLATEAU NONUNION;  Surgeon: Amina Scott MD;  [facility];  Service: Orthopedics   • IN THYROIDECTOMY TOTAL/COMPLETE N/A 02/03/2021    Procedure: TOTAL THYROIDECTOMY WITH NIMS MONITORING;  Surgeon: Carin Hollins MD;  Location:  MAIN OR;  Service: ENT   • TOE AMPUTATION Right 08/11/2020    Procedure: AMPUTATION TOE;  Surgeon: Gail Osuna DPM;  [facility]: [facility];  Service: Podiatry   • US GUIDED THYROID BIOPSY  07/02/2020       FAMILY HISTORY:  Family History   Problem Relation Age of Onset   • Cancer Mother    • Hypertension Father        SOCIAL HISTORY:  Social History     Tobacco Use   • Smoking status: Never   • Smokeless tobacco: Never   Vaping Use   • Vaping Use: Never used   Substance Use Topics   • Alcohol use: Not Currently     Comment: quit 1-2023   • Drug use: Not Currently     Types: Marijuana       MEDICATIONS:  No current facility-administered medications for this visit  No current outpatient medications on file      Facility-Administered Medications Ordered in Other Visits:   •  acetaminophen (TYLENOL) tablet 650 mg, 650 mg, Oral, Q8H PRN, Yadira Welch PA-C, 650 mg at 05/15/23 2116  •  aspirin chewable tablet 81 mg, 81 mg, Oral, Daily, Yadira Welch PA-C, 81 mg at 05/16/23 0813  •  atorvastatin (LIPITOR) tablet 40 mg, 40 mg, Oral, QAM, Yadira Welch PA-C, 40 mg at 05/16/23 0813  •  clopidogrel (PLAVIX) tablet 75 mg, 75 mg, Oral, QAM, Yadira Welch PA-C, 75 mg at 05/16/23 0813  •  enoxaparin (LOVENOX) subcutaneous injection 40 mg, 40 mg, Subcutaneous, Daily, Yadira Welch PA-C, 40 mg at 05/16/23 0601  •  ferrous sulfate tablet 325 mg, 325 mg, Oral, Daily With Breakfast, NAILA Monroy, 325 mg at 05/16/23 0813  •  insulin glargine (LANTUS) subcutaneous injection 25 Units 0 25 mL, 25 Units, Subcutaneous, HS, Yadira Welch PA-C  •  insulin lispro (HumaLOG) 100 units/mL subcutaneous injection 15 Units, 15 Units, Subcutaneous, TID With Meals, Yadira Welch PA-C, 15 Units at 05/16/23 1133  •  insulin lispro (HumaLOG) 100 units/mL subcutaneous injection 2-12 Units, 2-12 Units, Subcutaneous, 4x Daily (AC & HS), 4 Units at 05/16/23 1133 **AND** Fingerstick Glucose (POCT), , , 4x Daily AC and at bedtime, Yadira Welch PA-C  •  [START ON 5/17/2023] levothyroxine tablet 175 mcg, 175 mcg, Oral, Early Morning, Yadira Welch PA-C  •  magnesium hydroxide (MILK OF MAGNESIA) oral suspension 30 mL, 30 mL, Oral, Daily PRN, Yadira Welch PA-C  •  melatonin tablet 3 mg, 3 mg, Oral, HS, Roman Spaulding MD, 3 mg at 05/15/23 2112  •  methocarbamol (ROBAXIN) tablet 500 mg, 500 mg, Oral, Q6H PRN, Yadira Welch PA-C  •  ondansetron (ZOFRAN-ODT) dispersible tablet 4 mg, 4 mg, Oral, Q6H PRN, Yadira Welch PA-C  •  pantoprazole (PROTONIX) EC tablet 40 mg, 40 mg, Oral, Early Morning, Yadira Welch PA-C, 40 mg at 05/16/23 0601  •  senna-docusate sodium (SENOKOT S) 8 6-50 mg per tablet 1 tablet, 1 tablet, Oral, Daily, Yadira Welch PA-C, 1 tablet at 05/15/23 6764  •  tamsulosin ([facility]) "capsule 0 4 mg, 0 4 mg, Oral, Daily With Scarlet Gamboa, PA-C, 0 4 mg at 05/15/23 1717  •  verapamil (CALAN-SR) CR tablet 360 mg, 360 mg, Oral, HS, Breanna Alt, PA-C, 360 mg at 05/15/23 2112  •  zolpidem (AMBIEN) tablet 10 mg, 10 mg, Oral, HS PRN, Breanna Alt, PA-C, 10 mg at 05/15/23 2112    ALLERGIES:  Allergies   Allergen Reactions   • Pollen Extract Eye Swelling     Eyes get watery        REVIEW OF SYSTEMS:  MSK: right knee pain, wound drainage  Neuro: negative  Pertinent items are otherwise noted in HPI  A comprehensive review of systems was otherwise negative  LABS:  HgA1c:   Lab Results   Component Value Date    HGBA1C 7 9 (H) 04/24/2023     BMP:   Lab Results   Component Value Date    GLUCOSE 109 05/01/2023    CALCIUM 8 4 05/15/2023    K 3 5 05/15/2023    CO2 25 05/15/2023     05/15/2023    BUN 11 05/15/2023    CREATININE 0 70 05/15/2023     CBC: No components found for: CBC    _____________________________________________________  PHYSICAL EXAMINATION:  Vital signs: Ht 6' 4\" (1 93 m)   Wt 123 kg (271 lb)   BMI 32 99 kg/m²   General: No acute distress, awake and alert  Psychiatric: Mood and affect appear appropriate  HEENT: Trachea Midline, No torticollis, no apparent facial trauma  Cardiovascular: No audible murmurs; Extremities appear perfused  Pulmonary: No audible wheezing or stridor  Skin: No open lesions; see further details (if any) below      MUSCULOSKELETAL EXAMINATION:  Right lower extremity examination :  - Patient sitting comfortably in the office in no acute distress   -Healing wounds noted over the medial and lateral aspect of the right lower extremity with sutures intact  Mild serous drainage present on the medial aspect of the wound  Surrounding erythema extending to the mid to distal tibial shaft present  -Mild tender palpation noted over incision sites    No other bony or soft tissue tenderness palpation noted at this time   -Range of motion of the knee  : Patient " "lacks the last 5 degrees of extension and is able to flex the knee to approximately 80 degrees limited by  - NV intact    _____________________________________________________  STUDIES REVIEWED:  I personally reviewed the images and interpretation is as follows:  Right knee XR 2 views:  Healing tibial plateau fracture with hardware intact        PROCEDURES PERFORMED:  No procedures were performed at this time  Jesus Roper PA-C  - assisting  Amanda Armstrong MD                  Portions of the record may have been created with voice recognition software  Occasional wrong word or \"sound a like\" substitutions may have occurred due to the inherent limitations of voice recognition software  Read the chart carefully and recognize, using context, where substitutions have occurred      "

## 2023-05-16 NOTE — CONSULTS
Consult - Podiatry   Yasmin Fierro 59 y o  male MRN: [id number]  Unit/Bed#: -01 Encounter: [id number]    Assessment/Plan     Assessment:  1  Left Diabetic foot ulcer   2  Left anterior lower leg ulcers  2  Type 2 DM with neuropathy   3  Hx of right TMA    Plan:    -Left diabetic foot ulcer debrided as below  Stable at this time I recommend close monitoring with local wound care  Wound care instruction placed in for nursing   -Weight-bear as tolerated  -Follow-up with wound care upon discharge, continue local wound care to left foot and lower leg ulcers  -Discussed importance of glycemic control proper protein intake for wound healing  -Podiatry will sign off  Please call if any questions or concerns  Wound debridement note: After verbal consent was obtained, wound located at left midfoot plantar was excisionally/Surgically/ debrided with #15 blade excising/removing nonviable devitalized, fibrotic, slough, hyperkeratotic, necrotic tissue including subcutaneous tissue to the depth of subcutaneous tissue  Post debridement wound measurements 1 8x1 5x0 2cm, with appearance of wound fresh bleeding tissue, viable, granular, brisk pinpoint bleeding 2 7sq cm debrided  Lab Results   Component Value Date    HGBA1C 7 9 (H) 04/24/2023             History of Present Illness     HPI:  Yasmin Firero is a 59 y o  male with PMH significant for type 2 diabetes and history of right TMA presents for an evaluation of left plantar midfoot ulcer  Podiatry is consulted for wound management  Patient reports he has a history of right transmetatarsal amputation in 2020  He sees Dr Gary Hess on outpatient basis  No other complaints at this time  Denies nausea vomit fever chills or foot pain  Consults  Review of Systems   Constitutional: Negative  HENT: Negative  Eyes: Negative  Respiratory: Negative  Cardiovascular: Negative  Gastrointestinal: Negative      Musculoskeletal: Negative  Skin: Left diabetic foot ulcer  Neurological: Negative  Psych: negative         Historical Information   Past Medical History:   Diagnosis Date   • Broken internal right [facility] prosthesis ([facility] ) 01/2023   • Chronic kidney disease    • Colon polyp    • Constipation 03/09/2023   • Diabetes mellitus ([facility] )    • Disease of thyroid gland    • GERD (gastroesophageal reflux disease)    • HHS vs DKA 11/16/2018   • Hyperlipidemia    • Hypertension    • Thyroid cancer ([facility] )      Past Surgical History:   Procedure Laterality Date   • COLONOSCOPY     • FOOT AMPUTATION Right    • FOOT SURGERY Right 2014   • IR AORTAGRAM WITH RUN-OFF  08/06/2020   • IR TUNNELED CENTRAL LINE PLACEMENT  09/08/2020   • IR TUNNELED CENTRAL LINE REMOVAL  09/28/2020   • [place] AMPUTATION FOOT TRANSMETARSAL Right 09/03/2020    Procedure: AMPUTATION TRANSMETATARSAL (TMA);  Surgeon: Samanta Childers DPM;  Location: [facility];  Service: Podiatry   • [place] OPEN [facility] UNICONDYLAR Right 5/1/2023    Procedure: OPEN REDUCTION W/ INTERNAL FIXATION (ORIF) RIGHT TIBIAL PLATEAU NONUNION;  Surgeon: Rain Lombardi MD;  [facility]: [facility];  Service: Orthopedics   • [place] THYROIDECTOMY TOTAL/COMPLETE N/A 02/03/2021    Procedure: TOTAL THYROIDECTOMY WITH NIMS MONITORING;  Surgeon: Kathleen Aguilar MD;  Location:  MAIN OR;  Service: ENT   • TOE AMPUTATION Right 08/11/2020    Procedure: AMPUTATION TOE;  Surgeon: Samanta Childers DPM;  [facility]: [facility];  Service: Podiatry   • US GUIDED THYROID BIOPSY  07/02/2020     Social History   Social History     Substance and Sexual Activity   Alcohol Use Not Currently    Comment: quit 1-2023     Social History     Substance and Sexual Activity   Drug Use Not Currently   • Types: Marijuana     Social History     Tobacco Use   Smoking Status Never   Smokeless Tobacco Never     Family History:   Family History   Problem Relation Age of Onset   • Cancer Mother    • Hypertension Father        Meds/Allergies   Medications Prior to "Admission   Medication   • acetaminophen (TYLENOL) 325 mg tablet   • atorvastatin (LIPITOR) 40 mg tablet   • BD PEN NEEDLE DEV U/F 32G X 4 MM MISC   • Blood Glucose Monitoring Suppl (ONE TOUCH ULTRA 2) w/Device KIT   • Blood Glucose Monitoring Suppl (OneTouch Verio Reflect) w/Device KIT   • clopidogrel (PLAVIX) 75 mg tablet   • enoxaparin (LOVENOX) 40 mg/0 4 mL   • escitalopram (LEXAPRO) 10 mg tablet   • glucose blood (OneTouch Verio) test strip   • HYDROmorphone (DILAUDID) 2 mg tablet   • insulin glargine (LANTUS) 100 units/mL subcutaneous injection   • Insulin Lispro (HumaLOG) 100 units/mL cartridge for injection   • levothyroxine 150 mcg tablet   • losartan (COZAAR) 50 mg tablet   • omeprazole (PriLOSEC) 40 MG capsule   • OneTouch Delica Lancets 11O MISC   • tamsulosin (FLOMAX) 0 4 mg   • verapamil (VERELAN PM) 360 MG 24 hr capsule   • zolpidem (AMBIEN) 10 mg tablet     Allergies   Allergen Reactions   • Pollen Extract Eye Swelling     Eyes get watery        Objective   First Vitals:   Blood Pressure: (!) 176/94 (05/10/23 1620)  Pulse: 88 (05/10/23 1620)  Temperature: 98 1 °F (36 7 °C) (05/10/23 1620)  Temp Source: Temporal (05/10/23 1620)  Respirations: 18 (05/10/23 1620)  Height: 6' 4\" (193 cm) (05/10/23 1620)  Weight - Scale: 121 kg (266 lb 15 6 oz) (05/10/23 1620)  SpO2: 96 % (05/10/23 1620)    Current Vitals:   Blood Pressure: 104/60 (05/16/23 0723)  Pulse: 74 (05/16/23 0723)  Temperature: 98 °F (36 7 °C) (05/16/23 0723)  Temp Source: Temporal (05/16/23 0723)  Respirations: 18 (05/16/23 0723)  Height: 6' 4\" (193 cm) (05/10/23 1620)  Weight - Scale: 123 kg (271 lb 6 2 oz) (05/15/23 0600)  SpO2: 98 % (05/16/23 0723)    /60 (BP Location: Right arm)   Pulse 74   Temp 98 °F (36 7 °C) (Temporal)   Resp 18   Ht 6' 4\" (1 93 m)   Wt 123 kg (271 lb 6 2 oz)   SpO2 98%   BMI 33 03 kg/m²      Physical Exam    General Appearance:    Alert, cooperative, no distress   Head:    Normocephalic, without obvious " abnormality, atraumatic   Neck:   Supple, symmetrical, trachea midline   Lungs:     Respirations unlabored, no audible wheezes   Abdomen:     Soft, non-tender   Lower Extremities:    Vascular:   Right DP pulse is 1/4, Right PT pulse is 1/4, Left DP pulse is 1/4, Left PT pulse is 1/4  Skin temperature is warm bilaterally  Musculoskeletal:  Hx of right TMA   Adductovarus foot deformity on the left  Dermatological:  Left plantar midfoot ulcer probes to subcutaneous tissue with granular and bleeding wound base postdebridement  Periwound erythema or edema or pain with palpation noted  No clinical signs of infection present at this time  Neurological:  Gross sensation is intact  Protective sensation is diminished  Patient Reports numbness and/or paresthesias      Lab Results:   Admission on 05/10/2023   Component Date Value   • POC Glucose 05/10/2023 165 (H)    • POC Glucose 05/10/2023 120    • Sodium 05/11/2023 134 (L)    • Potassium 05/11/2023 3 5    • Chloride 05/11/2023 100    • CO2 05/11/2023 26    • ANION GAP 05/11/2023 8    • BUN 05/11/2023 11    • Creatinine 05/11/2023 0 63    • Glucose 05/11/2023 124    • Glucose, Fasting 05/11/2023 124 (H)    • Calcium 05/11/2023 8 2 (L)    • Corrected Calcium 05/11/2023 9 1    • AST 05/11/2023 24    • ALT 05/11/2023 11    • Alkaline Phosphatase 05/11/2023 147 (H)    • Total Protein 05/11/2023 6 3 (L)    • Albumin 05/11/2023 2 9 (L)    • Total Bilirubin 05/11/2023 0 42    • eGFR 05/11/2023 107    • WBC 05/11/2023 9 37    • RBC 05/11/2023 2 92 (L)    • Hemoglobin 05/11/2023 8 7 (L)    • Hematocrit 05/11/2023 27 4 (L)    • MCV 05/11/2023 94    • MCH 05/11/2023 29 8    • MCHC 05/11/2023 31 8    • RDW 05/11/2023 13 4    • MPV 05/11/2023 10 5    • Platelets 03/77/4457 501 (H)    • nRBC 05/11/2023 0    • Neutrophils Relative 05/11/2023 59    • Immat GRANS % 05/11/2023 2    • Lymphocytes Relative 05/11/2023 24    • Monocytes Relative 05/11/2023 12    • Eosinophils Relative 05/11/2023 2    • Basophils Relative 05/11/2023 1    • Neutrophils Absolute 05/11/2023 5 54    • Immature Grans Absolute 05/11/2023 0 18    • Lymphocytes Absolute 05/11/2023 2 24    • Monocytes Absolute 05/11/2023 1 12    • Eosinophils Absolute 05/11/2023 0 22    • Basophils Absolute 05/11/2023 0 07    • POC Glucose 05/11/2023 150 (H)    • POC Glucose 05/11/2023 189 (H)    • POC Glucose 05/11/2023 84    • POC Glucose 05/11/2023 135    • POC Glucose 05/11/2023 211 (H)    • POC Glucose 05/12/2023 178 (H)    • POC Glucose 05/12/2023 155 (H)    • POC Glucose 05/12/2023 134    • POC Glucose 05/12/2023 152 (H)    • POC Glucose 05/13/2023 210 (H)    • POC Glucose 05/13/2023 225 (H)    • POC Glucose 05/13/2023 118    • POC Glucose 05/13/2023 135    • POC Glucose 05/14/2023 191 (H)    • POC Glucose 05/14/2023 253 (H)    • POC Glucose 05/14/2023 143 (H)    • POC Glucose 05/14/2023 146 (H)    • WBC 05/15/2023 9 75    • RBC 05/15/2023 3 03 (L)    • Hemoglobin 05/15/2023 9 0 (L)    • Hematocrit 05/15/2023 28 4 (L)    • MCV 05/15/2023 94    • MCH 05/15/2023 29 7    • MCHC 05/15/2023 31 7    • RDW 05/15/2023 13 8    • MPV 05/15/2023 10 2    • Platelets 62/14/1888 571 (H)    • nRBC 05/15/2023 0    • Neutrophils Relative 05/15/2023 67    • Immat GRANS % 05/15/2023 1    • Lymphocytes Relative 05/15/2023 19    • Monocytes Relative 05/15/2023 10    • Eosinophils Relative 05/15/2023 2    • Basophils Relative 05/15/2023 1    • Neutrophils Absolute 05/15/2023 6 63    • Immature Grans Absolute 05/15/2023 0 06    • Lymphocytes Absolute 05/15/2023 1 88    • Monocytes Absolute 05/15/2023 0 97    • Eosinophils Absolute 05/15/2023 0 16    • Basophils Absolute 05/15/2023 0 05    • Sodium 05/15/2023 134 (L)    • Potassium 05/15/2023 3 5    • Chloride 05/15/2023 101    • CO2 05/15/2023 25    • ANION GAP 05/15/2023 8    • BUN 05/15/2023 11    • Creatinine 05/15/2023 0 70    • Glucose 05/15/2023 216 (H)    • Glucose, Fasting 05/15/2023 216 (H)    • "Calcium 05/15/2023 8 4    • eGFR 05/15/2023 102    • TSH 3RD GENERATON 05/15/2023 26 562 (H)    • Iron Saturation 05/15/2023 21    • TIBC 05/15/2023 224 (L)    • Iron 05/15/2023 47 (L)    • Ferritin 05/15/2023 992 (H)    • POC Glucose 05/15/2023 227 (H)    • POC Glucose 05/15/2023 207 (H)    • POC Glucose 05/15/2023 191 (H)    • POC Glucose 05/15/2023 141 (H)    • POC Glucose 05/16/2023 206 (H)    • POC Glucose 05/16/2023 225 (H)                            Imaging: I have personally reviewed pertinent films in PACS  EKG, Pathology, and Other Studies: I have personally reviewed pertinent reports  Code Status: Level 1 - Full Code      Portions of the record may have been created with voice recognition software  Occasional wrong word or \"sound a like\" substitutions may have occurred due to the inherent limitations of voice recognition software  Read the chart carefully and recognize, using context, where substitutions have occurred            "

## 2023-05-16 NOTE — PATIENT INSTRUCTIONS
- Continue non-weight bearing Right lower extremity   - Discontinue knee immobilizer at this time   - Sutures will remain in place until otherwise stated   - Continue with physical therapy : gait training, ambulation assistance     - Cleared for gentle ROM of the knee   - Recommend IV ABX for the right lower extremity   - Over the counter analgesics as needed / directed   - Ice / heat as directed   - Discussed possibility of surgical intervention in the future pending symptoms    - Incision and drainage right lower extremity   - Follow up 4 weeks with repeat XR

## 2023-05-16 NOTE — PROGRESS NOTES
05/16/23 0645   Pain Assessment   Pain Assessment Tool 0-10   Pain Score 5   Pain Location/Orientation Orientation: Right;Location: Leg   Restrictions/Precautions   Precautions Bed/chair alarms; Fall Risk   RLE Weight Bearing Per Order NWB   Braces or Orthoses Knee immobilizer   Cognition   Orientation Level Oriented X4   Subjective   Subjective Patient reports he is ready to go to his appointment to find out how things are going with his leg  Roll Left and Right   Type of Assistance Needed Independent   Physical Assistance Level No physical assistance   Roll Left and Right CARE Score 6   Sit to Lying   Type of Assistance Needed Independent   Physical Assistance Level No physical assistance   Sit to Lying CARE Score 6   Lying to Sitting on Side of Bed   Type of Assistance Needed Independent   Physical Assistance Level No physical assistance   Lying to Sitting on Side of Bed CARE Score 6   Sit to Stand   Comment Not performed this date   Bed-Chair Transfer   Type of Assistance Needed Supervision   Physical Assistance Level No physical assistance   Comment Sit pivot, required bed elevated compared to wheelchair surface  Assist to hold wheelchair in place  Chair/Bed-to-Chair Transfer CARE Score 4   Car Transfer   Reason if not Attempted Environmental limitations   Car Transfer CARE Score 10   Walk 10 Feet   Reason if not Attempted Safety concerns   Walk 10 Feet CARE Score 88   Walk 50 Feet with Two Turns   Reason if not Attempted Safety concerns   Walk 50 Feet with Two Turns CARE Score 88   Walk 150 Feet   Reason if not Attempted Safety concerns   Walk 150 Feet CARE Score 88   Walking 10 Feet on Uneven Surfaces   Reason if not Attempted Safety concerns   Walking 10 Feet on Uneven Surfaces CARE Score 88   Ambulation   Does the patient walk? 0  No, and walking goal is not clinically indicated  Wheelchair mobility   Does the patient use a wheelchair? 1  Yes   Type of Wheelchair Used 1   Manual   Findings Wheelchair management in room   Curb or Single Stair   Reason if not Attempted Safety concerns   1 Step (Curb) CARE Score 88   4 Steps   Reason if not Attempted Safety concerns   4 Steps CARE Score 88   12 Steps   Reason if not Attempted Activity not applicable   12 Steps CARE Score 9   Picking Up Object   Reason if not Attempted Safety concerns   Picking Up Object CARE Score 88   Toilet Transfer   Comment Not required during session   Therapeutic Interventions   Strengthening Supine LE TE   Other Comments   Comments RIght KI in place   Assessment   Treatment Assessment Patient presents supine in bed, NAD  Agreeable to PT  Patient with ongoing anxiousness related to healing progress, ability to return to walking eventually  Patient remains motivated to become independent  Has appointment upcoming today and is looking forward to more information regarding  Remains in need of assist for transfers, UB weakness limiting full independence with transfer  Cont per POC  Problem List Decreased strength;Decreased range of motion;Decreased endurance; Impaired balance;Orthopedic restrictions;Pain;Decreased safety awareness   PT Barriers   Physical Impairment Decreased strength;Decreased range of motion;Decreased endurance; Impaired balance;Orthopedic restrictions;Pain;Decreased safety awareness   Functional Limitation Wheelchair management; Walking;Transfers;Standing;Stair negotiation;Ramp negotiation;Car transfers   Plan   Treatment/Interventions Functional transfer training;LE strengthening/ROM; Elevations; Therapeutic exercise; Endurance training;Patient/family training;Equipment eval/education; Bed mobility;Gait training   Progress Progressing toward goals   PT Therapy Minutes   PT Time In 0645   PT Time Out 0815   PT Total Time (minutes) 90   PT Mode of treatment - Individual (minutes) 90   PT Mode of treatment - Concurrent (minutes) 0   PT Mode of treatment - Group (minutes) 0   PT Mode of treatment - Co-treat (minutes) 0   PT Mode of Treatment - Total time(minutes) 90 minutes   PT Cumulative Minutes 331   Supine LE TE:  3x10, B/L LEs, 2# on left  SLR flexion  SLR   ABd  MRE hip ABd  Hip ADd yvonne  Heel slide  GS  TKE with t-band   PF - green t-band  DF    Updated blood work reveals abnormal thyroid levels  Patient does verbalize and demonstrate extreme fatigue, more than poor sleep quality  Patient struggles with activity related to fatigue, increased rest breaks required  Ongoing fatigue limiting progression to progressive standing tolerance and hopeful progression to ambulation with AD  Patient remains OOB in chair, all needs in reach  Alarm in place and activated  Encouraged use of call bell, patient verbalizes understanding

## 2023-05-16 NOTE — NURSING NOTE
Attempted to insert peripheral IV site for IV antibiotics recommended  by Ortho  Unable to obtain IV access after 3 attempts  Dr Bhavani Naranjo and Ann[facility] made aware  Will encourage PO fluids and have next shift attempt again

## 2023-05-16 NOTE — PROGRESS NOTES
05/16/23 0900   Pain Assessment   Pain Assessment Tool 0-10   Pain Score 4   Restrictions/Precautions   Precautions Bed/chair alarms; Fall Risk   RLE Weight Bearing Per Order NWB   Braces or Orthoses Knee immobilizer  (RLE)   Grooming   Able To Initiate Tasks; Wash/Dry Face;Wash/Dry Hands   Limitation Noted In Safety;Strength   Findings setup seated   Shower/Bathe Self   Type of Assistance Needed Physical assistance   Physical Assistance Level 25% or less   Shower/Bathe Self CARE Score 3   Bathing   Assessed Bath Style Shower   Anticipated D/C Bath Style Shower;Sponge Bath   Able to Donna Bam No   Able to [employer] Temperature No   Able to Wash/Rinse/Dry (body part) Left Arm;Right Arm;L Upper Leg;R Upper Leg;Chest;Abdomen;Perineal Area  (RLE wrapped in plastic to avoid getting brace wet)   Limitations Noted in Balance; Endurance;Problem Solving;ROM;Safety;Strength  (NWB RLE with knee immobilizer)   Positioning Seated   Adaptive Equipment Tub Bench; Shower Smart Plate  pt assisted with bathing and drying per request due to being uncomfortable while sitting   Tub/Shower Transfer   Limitations Noted In Balance; Endurance;Problem Solving;ROM;Safety;LE Strength  (NWB RLE with knee immobilizer)   Adaptive Equipment Grab Bars;Transfer Bench   Assessed Shower   Findings min A   Roll Left and Right   Type of Assistance Needed Independent   Roll Left and Right CARE Score 6   Sit to Lying   Type of Assistance Needed Independent   Sit to Lying CARE Score 6   Lying to Sitting on Side of Bed   Type of Assistance Needed Independent   Lying to Sitting on Side of Bed CARE Score 6   Sit to Stand   Type of Assistance Needed Physical assistance   Physical Assistance Level 25% or less   Comment cues NWB RLE   Sit to Stand CARE Score 3   Bed-Chair Transfer   Type of Assistance Needed Physical assistance   Physical Assistance Level 25% or less   Chair/Bed-to-Chair Transfer CARE Score 3   Toileting Hygiene Type of Assistance Needed Independent   Comment urinal use supine   Toileting Hygiene CARE Score 6   Toileting   Able to [address] down yes, up yes  Manage Hygiene Bladder   Limitations Noted In Balance;Problem Solving;ROM;Safety;LE Strength  (NWB with knee immobilizer RLE)   Exercise Tools   Other Exercise Tool 1 30 stress ball squeezes B hands   Other Exercise Tool 2 2 sets 15 up and down BUE with yellow therabar   Cognition   Overall Cognitive Status WFL   Orientation Level Oriented X4   Assessment   Treatment Assessment Pt presents seated in w/c agreeable to a shower however shower pt prefers unavailable for use  Pt requests return to bed due to discomfort and extreme fatigue with therex completed before return to shower room for ADL completion and related transfers  Pt tolerates sesison with rest breaks and repositioning supine per request expressing several times about extreme fatigue with RN and MD notified  Pt requires assist due to decreased balance, safety, endruance, strength/ ROM and NBW with knee immibilizer RLE  Pt requires assist to cover brace during shower and encouragement for transfers with reminders for hand placement and NWB RLE  Pt is progressing towards goals with improving confidence noted and will benefit from continued skilled OT services to increase independence with daily tasks  Problem List Decreased strength;Decreased range of motion;Decreased endurance; Impaired balance;Decreased safety awareness;Decreased skin integrity;Orthopedic restrictions;Pain  (NWB with knee immobolizer RLE)   Plan   Treatment/Interventions ADL retraining;Functional transfer training; Therapeutic exercise; Endurance training;Patient/family training;Equipment eval/education; Compensatory technique education   Progress Progressing toward goals   OT Therapy Minutes   OT Time In 0900   OT Time Out 1032   OT Total Time (minutes) 92   OT Mode of treatment - Individual (minutes) 92

## 2023-05-16 NOTE — CASE MANAGEMENT
"Tx team recommendations reviewed with patient, who expressed understanding  Pt declined for this worker to call his significant other, stating that he prefers to do so himself  Target DC date is 5/26 with [facility] (PT, OT); a list of providers was provided to Pt & a referral will be made based on Pt preference  Pt became tearful in discussing the DC plan; he expressed feeling that he does not think that is enough time to reach his mobility goals & stated \"I don't want to rely on others to help me anymore\", and reflected on the manner in which his self-identity has been negatively impacted by a loss of independence; provided validation & emotional support & encouragement  SW will continue to monitor & assist as needed with Tx & DC planning    "

## 2023-05-16 NOTE — QUICK NOTE
PM&R Quick note    Patient for follow up Orthopedics farzad today  Dr Wenda Gosselin reached out following the appointment  Based on their examination of the RLE and concerns for superficial Cellulitis, recommendation to start IV Vancomycin  Orders placed, pharmacy consult ordered to help assist with Vancomycin orders  Knee immobilizer now discontinued  [id number] for AROM of knee, hold on PROM  Follow up with ortho within 4 weeks with repeat XR  Patient requested transition to oral anticoagulation  Xarelto or Eliquis are best options per Ortho recommendations  Can price check with pharmacy tomorrow and initiate transition  Will need total of 6 weeks of anticoagulation following surgery          Hillary Oliveira PA-C  PM&R

## 2023-05-17 LAB
ANION GAP SERPL CALCULATED.3IONS-SCNC: 10 MMOL/L (ref 4–13)
BASOPHILS # BLD AUTO: 0.04 THOUSANDS/ÂΜL (ref 0–0.1)
BASOPHILS NFR BLD AUTO: 0 % (ref 0–1)
BUN SERPL-MCNC: 19 MG/DL (ref 5–25)
CALCIUM SERPL-MCNC: 8.4 MG/DL (ref 8.4–10.2)
CHLORIDE SERPL-SCNC: 100 MMOL/L (ref 96–108)
CO2 SERPL-SCNC: 23 MMOL/L (ref 21–32)
CREAT SERPL-MCNC: 0.77 MG/DL (ref 0.6–1.3)
EOSINOPHIL # BLD AUTO: 0.17 THOUSAND/ÂΜL (ref 0–0.61)
EOSINOPHIL NFR BLD AUTO: 2 % (ref 0–6)
ERYTHROCYTE [DISTWIDTH] IN BLOOD BY AUTOMATED COUNT: 13.9 % (ref 11.6–15.1)
GFR SERPL CREATININE-BSD FRML MDRD: 98 ML/MIN/1.73SQ M
GLUCOSE P FAST SERPL-MCNC: 232 MG/DL (ref 65–99)
GLUCOSE SERPL-MCNC: 136 MG/DL (ref 65–140)
GLUCOSE SERPL-MCNC: 150 MG/DL (ref 65–140)
GLUCOSE SERPL-MCNC: 206 MG/DL (ref 65–140)
GLUCOSE SERPL-MCNC: 230 MG/DL (ref 65–140)
GLUCOSE SERPL-MCNC: 232 MG/DL (ref 65–140)
HCT VFR BLD AUTO: 27.3 % (ref 36.5–49.3)
HGB BLD-MCNC: 8.4 G/DL (ref 12–17)
IMM GRANULOCYTES # BLD AUTO: 0.07 THOUSAND/UL (ref 0–0.2)
IMM GRANULOCYTES NFR BLD AUTO: 1 % (ref 0–2)
LYMPHOCYTES # BLD AUTO: 1.83 THOUSANDS/ÂΜL (ref 0.6–4.47)
LYMPHOCYTES NFR BLD AUTO: 18 % (ref 14–44)
MCH RBC QN AUTO: 29.2 PG (ref 26.8–34.3)
MCHC RBC AUTO-ENTMCNC: 30.8 G/DL (ref 31.4–37.4)
MCV RBC AUTO: 95 FL (ref 82–98)
MONOCYTES # BLD AUTO: 1.36 THOUSAND/ÂΜL (ref 0.17–1.22)
MONOCYTES NFR BLD AUTO: 13 % (ref 4–12)
NEUTROPHILS # BLD AUTO: 6.89 THOUSANDS/ÂΜL (ref 1.85–7.62)
NEUTS SEG NFR BLD AUTO: 66 % (ref 43–75)
NRBC BLD AUTO-RTO: 0 /100 WBCS
PLATELET # BLD AUTO: 512 THOUSANDS/UL (ref 149–390)
PMV BLD AUTO: 10.3 FL (ref 8.9–12.7)
POTASSIUM SERPL-SCNC: 3.5 MMOL/L (ref 3.5–5.3)
RBC # BLD AUTO: 2.88 MILLION/UL (ref 3.88–5.62)
SODIUM SERPL-SCNC: 133 MMOL/L (ref 135–147)
WBC # BLD AUTO: 10.36 THOUSAND/UL (ref 4.31–10.16)

## 2023-05-17 PROCEDURE — 97535 SELF CARE MNGMENT TRAINING: CPT

## 2023-05-17 PROCEDURE — 97530 THERAPEUTIC ACTIVITIES: CPT

## 2023-05-17 PROCEDURE — 97110 THERAPEUTIC EXERCISES: CPT

## 2023-05-17 PROCEDURE — 99232 SBSQ HOSP IP/OBS MODERATE 35: CPT | Performed by: PHYSICAL MEDICINE & REHABILITATION

## 2023-05-17 PROCEDURE — 80048 BASIC METABOLIC PNL TOTAL CA: CPT | Performed by: PHYSICAL MEDICINE & REHABILITATION

## 2023-05-17 PROCEDURE — 97112 NEUROMUSCULAR REEDUCATION: CPT

## 2023-05-17 PROCEDURE — 85025 COMPLETE CBC W/AUTO DIFF WBC: CPT | Performed by: PHYSICAL MEDICINE & REHABILITATION

## 2023-05-17 PROCEDURE — 82948 REAGENT STRIP/BLOOD GLUCOSE: CPT

## 2023-05-17 PROCEDURE — 97542 WHEELCHAIR MNGMENT TRAINING: CPT

## 2023-05-17 RX ORDER — ESCITALOPRAM OXALATE 5 MG/1
5 TABLET ORAL DAILY
Status: DISCONTINUED | OUTPATIENT
Start: 2023-05-18 | End: 2023-05-30 | Stop reason: HOSPADM

## 2023-05-17 RX ORDER — SACCHAROMYCES BOULARDII 250 MG
250 CAPSULE ORAL 2 TIMES DAILY
Status: DISCONTINUED | OUTPATIENT
Start: 2023-05-17 | End: 2023-05-17

## 2023-05-17 RX ORDER — INSULIN LISPRO 100 [IU]/ML
20 INJECTION, SOLUTION INTRAVENOUS; SUBCUTANEOUS
Status: DISCONTINUED | OUTPATIENT
Start: 2023-05-17 | End: 2023-05-30 | Stop reason: HOSPADM

## 2023-05-17 RX ADMIN — VERAPAMIL HYDROCHLORIDE 360 MG: 180 TABLET ORAL at 21:02

## 2023-05-17 RX ADMIN — CEFAZOLIN SODIUM 2000 MG: 2 SOLUTION INTRAVENOUS at 21:01

## 2023-05-17 RX ADMIN — INSULIN LISPRO 20 UNITS: 100 INJECTION, SOLUTION INTRAVENOUS; SUBCUTANEOUS at 17:37

## 2023-05-17 RX ADMIN — ACETAMINOPHEN 650 MG: 325 TABLET ORAL at 19:37

## 2023-05-17 RX ADMIN — INSULIN LISPRO 4 UNITS: 100 INJECTION, SOLUTION INTRAVENOUS; SUBCUTANEOUS at 12:14

## 2023-05-17 RX ADMIN — TAMSULOSIN HYDROCHLORIDE 0.4 MG: 0.4 CAPSULE ORAL at 16:22

## 2023-05-17 RX ADMIN — ATORVASTATIN CALCIUM 40 MG: 40 TABLET, FILM COATED ORAL at 08:08

## 2023-05-17 RX ADMIN — Medication 3 MG: at 21:02

## 2023-05-17 RX ADMIN — INSULIN LISPRO 2 UNITS: 100 INJECTION, SOLUTION INTRAVENOUS; SUBCUTANEOUS at 17:36

## 2023-05-17 RX ADMIN — ZOLPIDEM TARTRATE 10 MG: 5 TABLET ORAL at 21:02

## 2023-05-17 RX ADMIN — CEFAZOLIN SODIUM 2000 MG: 2 SOLUTION INTRAVENOUS at 14:03

## 2023-05-17 RX ADMIN — ENOXAPARIN SODIUM 40 MG: 40 INJECTION SUBCUTANEOUS at 05:22

## 2023-05-17 RX ADMIN — INSULIN GLARGINE 25 UNITS: 100 INJECTION, SOLUTION SUBCUTANEOUS at 21:02

## 2023-05-17 RX ADMIN — INSULIN LISPRO 15 UNITS: 100 INJECTION, SOLUTION INTRAVENOUS; SUBCUTANEOUS at 12:15

## 2023-05-17 RX ADMIN — CLOPIDOGREL BISULFATE 75 MG: 75 TABLET ORAL at 08:08

## 2023-05-17 RX ADMIN — CEFAZOLIN SODIUM 2000 MG: 2 SOLUTION INTRAVENOUS at 05:22

## 2023-05-17 RX ADMIN — ASPIRIN 81 MG 81 MG: 81 TABLET ORAL at 08:08

## 2023-05-17 RX ADMIN — FERROUS SULFATE TAB 325 MG (65 MG ELEMENTAL FE) 325 MG: 325 (65 FE) TAB at 07:23

## 2023-05-17 RX ADMIN — INSULIN LISPRO 15 UNITS: 100 INJECTION, SOLUTION INTRAVENOUS; SUBCUTANEOUS at 08:08

## 2023-05-17 RX ADMIN — LEVOTHYROXINE SODIUM 175 MCG: 75 TABLET ORAL at 05:23

## 2023-05-17 RX ADMIN — SENNOSIDES AND DOCUSATE SODIUM 1 TABLET: 50; 8.6 TABLET ORAL at 08:08

## 2023-05-17 RX ADMIN — PANTOPRAZOLE SODIUM 40 MG: 40 TABLET, DELAYED RELEASE ORAL at 05:23

## 2023-05-17 RX ADMIN — INSULIN LISPRO 4 UNITS: 100 INJECTION, SOLUTION INTRAVENOUS; SUBCUTANEOUS at 08:07

## 2023-05-17 NOTE — PLAN OF CARE
Problem: PAIN - ADULT  Goal: Verbalizes/displays adequate comfort level or baseline comfort level  Description: Interventions:  - Encourage patient to monitor pain and request assistance  - Assess pain using appropriate pain scale  - Administer analgesics based on type and severity of pain and evaluate response  - Implement non-pharmacological measures as appropriate and evaluate response  - Consider cultural and social influences on pain and pain management  - Notify physician/advanced practitioner if interventions unsuccessful or patient reports new pain  Outcome: Progressing     Problem: INFECTION - ADULT  Goal: Absence or prevention of progression during hospitalization  Description: INTERVENTIONS:  - Assess and monitor for signs and symptoms of infection  - Monitor lab/diagnostic results  - Monitor all insertion sites, i e  indwelling lines, tubes, and drains  - Monitor endotracheal if appropriate and nasal secretions for changes in amount and color  - [place] appropriate cooling/warming therapies per order  - Administer medications as ordered  - Instruct and encourage patient and family to use good hand hygiene technique  - Identify and instruct in appropriate isolation precautions for identified infection/condition  Outcome: Progressing

## 2023-05-17 NOTE — PROGRESS NOTES
05/17/23 0900   Pain Assessment   Pain Assessment Tool 0-10   Pain Score 4   Pain Location/Orientation Orientation: Right;Location: Leg   Restrictions/Precautions   Precautions Bed/chair alarms; Fall Risk   RLE Weight Bearing Per Order NWB   ROM Restrictions   (can do AROM of R knee, no PROM)   Oral Hygiene   Type of Assistance Needed Independent   Physical Assistance Level No physical assistance   Comment seated at sink   Oral Hygiene CARE Score 6   Lying to Sitting on Side of Bed   Type of Assistance Needed Independent   Physical Assistance Level No physical assistance   Lying to Sitting on Side of Bed CARE Score 6   Bed-Chair Transfer   Type of Assistance Needed Physical assistance   Physical Assistance Level 25% or less   Comment sit pivot from EOB to w/c   Chair/Bed-to-Chair Transfer CARE Score 3   Transfer Bed/Chair/Wheelchair   Limitations Noted In Balance; Endurance;Pain Management;LE Strength   Toileting Hygiene   Type of Assistance Needed Physical assistance  (Simultaneous filing  User may not have seen previous data )   Physical Assistance Level 51%-75%   Comment assist for clothing management; occurred x2 during session   Toileting Hygiene CARE Score 2  (Simultaneous filing  User may not have seen previous data )   Toileting   Able to Pull Clothing down no, up no  Manage Hygiene Bladder  (bowel during second trial)   Limitations Noted In Balance;ROM;Safety;LE Strength   Adaptive Equipment Grab Bar   Toilet Transfer   Type of Assistance Needed Physical assistance   Physical Assistance Level 25% or less   Comment sit pivot from w/c to toilet; occurred x2 during session   Toilet Transfer CARE Score 3   Toilet Transfer   Surface Assessed Standard Toilet   Transfer Technique Sit Pivot   Limitations Noted In Balance; Endurance;ROM;Safety;LE Strength   Adaptive Equipment Grab Bar   Exercise Tools   Other Exercise Tool 1 sanderbox x30 all planes of motion using bilat UE   Other Exercise Tool 2 UE strengthening in bilat UE using 3# dumbbell, 2x15 or 3x10: elbow flexion/extension, protraction/retraction, internal/external rotation, pronation/supination   Cognition   Overall Cognitive Status WFL   Arousal/Participation Alert; Cooperative;Responsive   Attention Within functional limits   Orientation Level Oriented X4   Memory Within functional limits   Following Commands Follows all commands and directions without difficulty   Assessment   Treatment Assessment Pt agreeable to OT session this AM  Received lying supine in bed  Pt declined presented ADL session, instead opting for oral care and toileting; details in respective sections  Pt's fxl transfers overall La with NWB maintained to RLE  Pt completed fxl mobility in w/c to OT room with supervision to complete ROM/strength exercises with fair tolerance and rest breaks taken as needed  Pt reported feeling very fatigued from adjustments in medicine and new IV started that impeded on sleep last night  Pt also reported anxiety regarding d/c next week and does not want to d/c if not ready  Pt continues with barriers to d/c of decreased strength throughout but especially RLE s/p tibia fx, decreased balance, NWB RLE, and decreased activity tolerance; all affect independence in self care and fxl transfers  Pt would benefit from continued skilled OT services in order to address listed barriers and prepare for safe d/c  Prognosis Good   Problem List Decreased strength;Decreased range of motion;Decreased endurance; Impaired balance;Decreased safety awareness;Decreased skin integrity;Orthopedic restrictions;Pain   Plan   Treatment/Interventions ADL retraining;Functional transfer training;LE strengthening/ROM; Therapeutic exercise; Endurance training;Patient/family training;Equipment eval/education; Compensatory technique education;OT   Progress Progressing toward goals   OT Therapy Minutes   OT Time In 0900   OT Time Out 1030   OT Total Time (minutes) 90   OT Mode of treatment - Individual (minutes) 90

## 2023-05-17 NOTE — PROGRESS NOTES
"   05/17/23 1033   Pain Assessment   Pain Assessment Tool 0-10   Pain Score 4   Pain Location/Orientation Orientation: Right;Orientation: Lower; Location: Leg   Pain Onset/Description Onset: Ongoing;Frequency: Constant/Continuous; Descriptor: Aching;Descriptor: Discomfort   Patient's Stated Pain Goal No pain   Hospital Pain Intervention(s) Repositioned; Ambulation/increased activity; Emotional support   Restrictions/Precautions   Precautions Bed/chair alarms   Weight Bearing Restrictions Yes   RLE Weight Bearing Per Order NWB   ROM Restrictions Yes   RLE ROM Restriction Range Limitation;PROM  (Only AROM permitted)   Cognition   Overall Cognitive Status WFL   Arousal/Participation Alert; Cooperative   Attention Within functional limits   Orientation Level Oriented X4   Memory Within functional limits   Following Commands Follows all commands and directions without difficulty   Subjective   Subjective \"I have been slammed with one thing after another since January  \"   Roll Left and Right   Type of Assistance Needed Independent   Physical Assistance Level No physical assistance   Comment HOB BSR   Roll Left and Right CARE Score 6   Sit to Lying   Type of Assistance Needed Independent   Physical Assistance Level No physical assistance   Comment HOB BSR   Sit to Lying CARE Score 6   Lying to Sitting on Side of Bed   Type of Assistance Needed Independent   Physical Assistance Level No physical assistance   Comment HOB BSR   Lying to Sitting on Side of Bed CARE Score 6   Sit to Stand   Type of Assistance Needed Physical assistance   Physical Assistance Level 25% or less   Comment NWB RLE   Sit to Stand CARE Score 3   Bed-Chair Transfer   Type of Assistance Needed Supervision   Physical Assistance Level No physical assistance   Comment sit pivot from w/c to EOB   Chair/Bed-to-Chair Transfer CARE Score 4   Transfer Bed/Chair/Wheelchair   Limitations Noted In Balance;Confidence; Endurance;Pain Management;Problem Solving;UE " Strength;LE Strength   Adaptive Equipment Roller Walker   Sit Pivot Supervision   Car Transfer   Reason if not Attempted Environmental limitations   Car Transfer CARE Score 10   Walk 10 Feet   Reason if not Attempted Safety concerns   Walk 10 Feet CARE Score 88   Walk 50 Feet with Two Turns   Reason if not Attempted Safety concerns   Walk 50 Feet with Two Turns CARE Score 88   Walk 150 Feet   Reason if not Attempted Safety concerns   Walk 150 Feet CARE Score 88   Walking 10 Feet on Uneven Surfaces   Reason if not Attempted Safety concerns   Walking 10 Feet on Uneven Surfaces CARE Score 88   Ambulation   Does the patient walk? 0  No, and walking goal is not clinically indicated  Wheel 50 Feet with Two Turns   Type of Assistance Needed Supervision   Physical Assistance Level No physical assistance   Wheel 50 Feet with Two Turns CARE Score 4   Wheel 150 Feet   Type of Assistance Needed Supervision   Physical Assistance Level No physical assistance   Wheel 150 Feet CARE Score 4   Wheelchair mobility   Does the patient use a wheelchair? 1  Yes   Type of Wheelchair Used 1  Manual   Method Right upper extremity; Left upper extremity   Assistance Provided For Remove Leg Rest;Replace Leg Rest;Remove armrests;Replace armrests   Distance Level Surface (feet) 283 ft  (273ft)   Curb or Single Stair   Reason if not Attempted Safety concerns   1 Step (Curb) CARE Score 88   4 Steps   Reason if not Attempted Safety concerns   4 Steps CARE Score 88   12 Steps   Reason if not Attempted Safety concerns   12 Steps CARE Score 88   Picking Up Object   Reason if not Attempted Safety concerns   Picking Up Object CARE Score 88   Toilet Transfer   Comment NA   Therapeutic Interventions   Strengthening seated ther ex B LE 1# weight LLE only, AROM only RLE (no RLE PROM)   Balance transfer training   Neuromuscular Re-Education dynamic sitting balance activities performed at EOB   Other bed mobility tasks, w/c mobility   Other Comments Comments RLE knee immobilizer discontinued   Assessment   Treatment Assessment Pt  participated in PT treatment this morning including W/C mobility; seated TE BLE all directions LLE 30 reps performed with 1# weights and RLE 10 reps with frequent rest periods required due to pain and fatigue; transfer training, neuromuscular re-education of movement performed to improve dynamic sitting balance performed at EOB supported by UE on mattress and close S including multidirectional weight shifting, reaching , and scooting, and education provided as needed for safety and direction to improve functional mobility and activity tolerance  Pt  becoming tearful during session when explaining multiple recent medical issues being experienced, sudden onset of weakness because of limitations beyond patients control, and  feelings of frustration with slow progression, slow  healing process, and  because of requiring so much help from friends, family and staff during current medical status  Pt  offered encouragement,  emotional support, and positive reassurance  Will continue to offer PT treatment as ordered and progress as able  Problem List Decreased strength;Decreased range of motion;Decreased endurance; Impaired balance;Decreased mobility; Decreased safety awareness;Decreased skin integrity; Impaired sensation;Orthopedic restrictions;Pain   PT Barriers   Physical Impairment Decreased strength;Decreased range of motion;Decreased endurance; Impaired balance;Decreased mobility; Decreased safety awareness;Decreased skin integrity; Impaired sensation;Orthopedic restrictions;Pain   Functional Limitation Wheelchair management; Walking;Transfers;Standing;Stair negotiation;Ramp negotiation;Car transfers   Plan   Treatment/Interventions Functional transfer training;LE strengthening/ROM; Elevations; Therapeutic exercise; Endurance training;Patient/family training;Equipment eval/education; Bed mobility;Gait training; Compensatory technique education   Progress Progressing toward goals   PT Therapy Minutes   PT Time In 1033   PT Time Out 1203   PT Total Time (minutes) 90   PT Mode of treatment - Individual (minutes) 90   PT Mode of treatment - Concurrent (minutes) 0   PT Mode of treatment - Group (minutes) 0   PT Mode of treatment - Co-treat (minutes) 0   PT Mode of Treatment - Total time(minutes) 90 minutes   PT Cumulative Minutes 421   Therapy Time missed   Time missed?  No

## 2023-05-17 NOTE — NURSING NOTE
C/o nausea no vomiting  Refuses need for antiemetic  All wound dressings changed, cleansed and tx to same with DSD as ordered  Photos taken of each wound as well and Sima LOVE made aware  Pt tearful and crying this afternoon  States he's depressed and never should have refused lexapro which is now discontinued  Sima LOVE made aware and states she will order same in a m

## 2023-05-17 NOTE — NURSING NOTE
Pt  Able to administer own Insulin without difficulties  No teachine needed  Iv started by Callie SAUNDERS  In left wrist, abx started

## 2023-05-17 NOTE — PROGRESS NOTES
PM&R PROGRESS NOTE:  Stevie Ley 59 y o  male MRN: [id number]  Unit/Bed#: -01 Encounter: [id number]        Rehabilitation Diagnosis: Impairment of mobility, safety and Activities of Daily Living (ADLs) due to Orthopedic Disorders:  08 9  Other Orthopedic Closed fracture of medial plataeu of right tibia    HPI: Stevie Ley is a 59 y o  male with a PMH significant for CKD, Diabetes mellitus, GERD, HLD and HTN who [facility] for surgical management of a previous tibial plateau fracture  Patient seen in April for the same and opted out of surgical intervention at that time  Now, presented with varus malalignment, instability of the joint and significant comminution and bone void  He was taken to the OR with Dr Savannah Morris on 05/01 and is s/p ORIF to the right tibial plateau  Ordered NWB status post-op for approximately 10-12 weeks with immobilizer in place for 2 weeks post-op  Post-op patient developed urinary retention  Urology consulted  Patient refused straight cath and was following urinary retention protocol to only get casey catheter insertion if needed  Nephrology consulted with CKD history, started on IV fluid resuscitation  Developed hyponatremia  Home HCTZ was held and IVF were stopped  Etiology determined to be SIADH secondary to HCTZ  Once HCTZ was stopped, sodium started to improve  Patient evaluated by PT/OT and deemed appropriate for post acute rehabilitation services  He was accepted to [facility] and arrived on 5/10/23  SUBJECTIVE: Patient seen and evaluated  Patient admitted to [facility] however states this has happened previously  This is the first time I've witnessed patient having chills  He denies feeling feverish  Denying chest pain, shortness of breath, abdominal discomfort, pain to RLE  Did request nursing to recheck temperature  Did turn up heat in patient's room per their request and ordered heating pad   Did note white count to be mildly elevated at 10 36 today, previously within normal limits  Monitor for another day, if ongoing, repeat labs  Did just start IV antibiotics last night  Discussed with patient about price checking Xarelto or Eliquis and initiating this in place of Lovenox injections  Addendum: Discontinue aspirin/plavix for now  Initiate Xarelto 15 mg BID x3 weeks (6/8/23) then transition to Xarelto 20 mg daily though 6/12/23 to complete total of 6 weeks of anticoagulation  Can resume Aspirin/Plavix once Xarelto is completed  Co-pay $50 for 30-day supply  ASSESSMENT: Stable, progressing       PLAN:    Rehabilitation  • Functional deficits: impaired mobility, self care, NWB RLE, pain RLE    • Continue current rehabilitation plan of care to maximize function  • Functional update:   o PT:  Updated below  o OT:  Updated below    Physical Therapy Occupational Therapy Speech Therapy     Roll L-R: independent   Sit-lying: independent   Lying to sitting on side of bed: independent   Bed-chair transfer: supervision   Ambulation: safety concerns   Stairs: safety concerns     Eating: Independent  Grooming: Supervision  Bathing: Minimal Assistance  Bathing: Minimal Assistance  Upper Body Dressing: Supervision  Lower Body Dressing: Minimal Assistance  Toileting: Minimal Assistance  Tub/Shower Transfer:  Moderate Assistance  Toilet Transfer: Minimal Assistance  Cognition: Within Defined Limits  Orientation: Person, Place, Time, Situation                 • Estimated Discharge: 5/26/23 with Sudheer Schaefer PT/OT/N      Pain  • Tylenol 650 mg Q8H PRN for mild pain  • Robaxin 500 mg Q6H PRN for muscle spasms     DVT prophylaxis  • Lovenox sq inj 40 mg daily  • Refusing to wear SCDs     Bladder plan  • Continent     Bowel plan  • Continent   • Last BM 5/15/23  • Refusing stool softeners today  • Continue Senna daily HS, d/c Colace     Skin care  · Monitor skin daily  · Apply skin nourishing cream  · Reposition Q2H to offload pressure  · Elevate heels off bed  · Monitor incision site  · Dressings to be changed PRN with soilage and after showering  Apply ABD and ACE wrap       Stage 3b chronic kidney disease [facility])  Assessment & Plan  Lab Results   Component Value Date    EGFR 98 05/17/2023    EGFR 102 05/15/2023    EGFR 107 05/11/2023    CREATININE 0 77 05/17/2023    CREATININE 0 70 05/15/2023    CREATININE 0 63 05/11/2023   · Monitor periodically  · Followed in acute care with Nephrology  · S/P IV Fluids  · HCTZ d/c in setting of hyponatremia     Type 2 diabetes mellitus with diabetic peripheral angiopathy without gangrene [facility])  Assessment & Plan  Lab Results   Component Value Date    HGBA1C 7 9 (H) 04/24/2023       Recent Labs     05/16/23  1638 05/16/23 2051 05/17/23  0719 05/17/23  1202   POCGLU 147* 184* 230* 206*       Blood Sugar Average: Last 72 hrs:  (P) 192 [id number]   · Monitor accu cheks and adjust regimen as needed   · Continue Lantus 25 units daily HS, 15 units humalog TID AC- increased to 20 units today, SSI  · Home: Lantus 30 units daily HS, Humalog 20 units TID AC  · Carb controlled diet     Acquired hypothyroidism  Assessment & Plan  · Levothyroxine 150 mcg daily increased to 175 mcg in setting of elevated TSH  · 11/14- 35 400, 5/15- 26 562  · Will need repeat TSH at next PCP follow up      Insomnia  Assessment & Plan  · Continue Ambien 10 mg HS PRN and Melatonin     PAD (peripheral artery disease) ([facility])  Assessment & Plan  · Continue home Plavix 75 mg daily    Hyponatremia  Assessment & Plan  · Seen by Nephrology in acute care  · Etiology determined to be SIADH secondary to HCTZ  · HCTZ since stopped   · 133 5/17    Hyperlipidemia  Assessment & Plan  · Continue Lipitor 40 mg     Gastroesophageal reflux disease without esophagitis  Assessment & Plan  · Continue Protonix 40 mg daily  · Home: Prilosec 40 mg daily    Hypertension, essential  Assessment & Plan  · Monitor vitals  · Continue Verapamil 360 mg daily HS    * Closed fracture of medial plateau of right tibia  Assessment & Plan  · Complex history   · Arrived 5/1 for surgical intervention of ongoing right tibial plateau fracture  · Previously in SNF but found to have varus malalignment, instability of the joint, significant comminution and bone void  · S/p ORIF on 05/01 with Dr Vicente Andrew  · NWB for 10-12 weeks  · Immobilizer to remain in place for 2 weeks at all times   · Dressings may be changed as needed; reached out to Ortho to confirm type of dressing change  · Followed up with Dr Enoc Chamberlain on 5/16/23  · For DVT ppx x6 weeks since post-op; patient would like to transition to Oral  Xarelto or Eliquis would be best option  Can price check with pharmacy and adjust    · Initiate IV Ancef for Cellulitis at RLE incision site per Ortho recommendations   · Monitor CBC/CMP   · Start probiotic   · Cleared for gentle ROM of the knee, ok for ARM, hold PROM at this time   · D/c knee immobilizer  · Follow up in 4 weeks with repeat XR with Ortho  · Acute chomprehensive interdisciplinary inpatient rehabilitation to include intensive skilled therapies as outlines with oversight and management by a rehabilitation Physician Assistant overseen by rehabilitation physician as well as inpatient rehabilitation nursing, case management and weekly interdisciplinary team meeting            Appreciate IM consultants medical co-management  Labs, medications, and imaging personally reviewed  ROS:  A ten point review of systems was completed and pertinent positives are listed in subjective section  All other systems reviewed were negative  Review of Systems   Constitutional: Positive for chills  Negative for fatigue and fever  HENT: Negative  Respiratory: Negative  Negative for shortness of breath  Cardiovascular: Negative  Negative for chest pain  Gastrointestinal: Negative  Negative for abdominal pain and constipation  Genitourinary: Negative  Negative for difficulty urinating  "  Musculoskeletal: Negative  Neurological: Negative  Psychiatric/Behavioral: Negative  OBJECTIVE:   /68 (BP Location: Right arm)   Pulse 60   Temp 98 3 °F (36 8 °C) (Temporal)   Resp 18   Ht 6' 4\" (1 93 m)   Wt 123 kg (271 lb 6 2 oz)   SpO2 94%   BMI 33 03 kg/m²     Physical Exam  Vitals reviewed  Constitutional:       General: He is not in acute distress  Appearance: He is not ill-appearing  Comments: Shaking from feeling cold, covered in 2+ blankets   HENT:      Head: Normocephalic and atraumatic  Eyes:      Pupils: Pupils are equal, round, and reactive to light  Cardiovascular:      Rate and Rhythm: Normal rate and regular rhythm  Pulses: Normal pulses  Heart sounds: Normal heart sounds  No murmur heard  Pulmonary:      Effort: Pulmonary effort is normal  No respiratory distress  Breath sounds: Normal breath sounds  Abdominal:      General: Bowel sounds are normal  There is no distension  Palpations: Abdomen is soft  Musculoskeletal:      Right lower leg: No edema  Left lower leg: No edema  Skin:     General: Skin is warm and dry  Comments: Visualized incision site  Sutures are intact  Mild pink discoloration levi-incision  Scant drainage noted to abd    Neurological:      General: No focal deficit present  Mental Status: He is alert and oriented to person, place, and time     Psychiatric:         Mood and Affect: Mood normal          Behavior: Behavior normal           Lab Results   Component Value Date    WBC 10 36 (H) 05/17/2023    HGB 8 4 (L) 05/17/2023    HCT 27 3 (L) 05/17/2023    MCV 95 05/17/2023     (H) 05/17/2023     Lab Results   Component Value Date    SODIUM 133 (L) 05/17/2023    K 3 5 05/17/2023     05/17/2023    CO2 23 05/17/2023    BUN 19 05/17/2023    CREATININE 0 77 05/17/2023    GLUC 232 (H) 05/17/2023    CALCIUM 8 4 05/17/2023     Lab Results   Component Value Date    INR 1 04 08/04/2020    INR " 0 99 11/15/2018    PROTIME 13 8 08/04/2020    PROTIME 13 0 11/15/2018           Current Facility-Administered Medications:   •  acetaminophen (TYLENOL) tablet 650 mg, 650 mg, Oral, Q8H PRN, Osiris Dover PA-C, 650 mg at 05/16/23 2116  •  aspirin chewable tablet 81 mg, 81 mg, Oral, Daily, Osiris Dover PA-C, 81 mg at 05/17/23 0808  •  atorvastatin (LIPITOR) tablet 40 mg, 40 mg, Oral, QAM, Osiris Dover PA-C, 40 mg at 05/17/23 0808  •  ceFAZolin (ANCEF) IVPB (premix in dextrose) 2,000 mg 50 mL, 2,000 mg, Intravenous, Q8H, Osiris Dover PA-C, Last Rate: 100 mL/hr at 05/17/23 0522, 2,000 mg at 05/17/23 0522  •  clopidogrel (PLAVIX) tablet 75 mg, 75 mg, Oral, QAM, Osiris Dover PA-C, 75 mg at 05/17/23 0808  •  enoxaparin (LOVENOX) subcutaneous injection 40 mg, 40 mg, Subcutaneous, Daily, Osiris Dover PA-C, 40 mg at 05/17/23 0522  •  ferrous sulfate tablet 325 mg, 325 mg, Oral, Daily With Breakfast, NAILA Pollard, 325 mg at 05/17/23 3662  •  insulin glargine (LANTUS) subcutaneous injection 25 Units 0 25 mL, 25 Units, Subcutaneous, HS, Osiris Dover PA-C, 25 Units at 05/16/23 2115  •  insulin lispro (HumaLOG) 100 units/mL subcutaneous injection 2-12 Units, 2-12 Units, Subcutaneous, 4x Daily (AC & HS), 4 Units at 05/17/23 1214 **AND** Fingerstick Glucose (POCT), , , 4x Daily AC and at bedtime, Osiris Dover PA-C  •  insulin lispro (HumaLOG) 100 units/mL subcutaneous injection 20 Units, 20 Units, Subcutaneous, TID With Meals, Osiris Stanislaw, PA-C  •  levothyroxine tablet 175 mcg, 175 mcg, Oral, Early Morning, Osiris Dover PA-C, 175 mcg at 05/17/23 3743  •  magnesium hydroxide (MILK OF MAGNESIA) oral suspension 30 mL, 30 mL, Oral, Daily PRN, Osiris Dover PA-C  •  melatonin tablet 3 mg, 3 mg, Oral, HS, Kacy Bello MD, 3 mg at 05/16/23 2116  •  methocarbamol (ROBAXIN) tablet 500 mg, 500 mg, Oral, Q6H PRN, Osiris Dover PA-C  •  ondansetron (ZOFRAN-ODT) dispersible tablet 4 mg, 4 mg, Oral, Q6H PRN, Shayy Hobson GABRIEL Guerrier  •  pantoprazole (PROTONIX) EC tablet 40 mg, 40 mg, Oral, Early Morning, Loretha Ringer, PA-C, 40 mg at 05/17/23 2975  •  saccharomyces boulardii (FLORASTOR) capsule 250 mg, 250 mg, Oral, BID, Loretha Ringer, PA-C  •  senna-docusate sodium (SENOKOT S) 8 6-50 mg per tablet 1 tablet, 1 tablet, Oral, Daily, Loretha Ringer, PA-C, 1 tablet at 05/17/23 7056  •  tamsulosin (FLOMAX) capsule 0 4 mg, 0 4 mg, Oral, Daily With Deloris Beer, PA-C, 0 4 mg at 05/16/23 1641  •  verapamil (CALAN-SR) CR tablet 360 mg, 360 mg, Oral, HS, Loretha Ringer, PA-C, 360 mg at 05/16/23 2116  •  zolpidem (AMBIEN) tablet 10 mg, 10 mg, Oral, HS PRN, Loretha Ringer, PA-C, 10 mg at 05/16/23 2116    Past Medical History:   Diagnosis Date   • Broken internal right knee prosthesis ([facility] ) 01/2023   • Chronic kidney disease    • Colon polyp    • Constipation 03/09/2023   • Diabetes mellitus ([place] [address] )    • Disease of thyroid gland    • GERD (gastroesophageal reflux disease)    • HHS vs DKA 11/16/2018   • Hyperlipidemia    • Hypertension    • Thyroid cancer [facility])        Patient Active Problem List    Diagnosis Date Noted   • MANNY (obstructive sleep apnea) 05/01/2023   • Closed fracture of medial plateau of right tibia 04/13/2023   • Depression and anxiety 03/03/2023   • Acquired genu varum of right lower extremity 02/21/2023   • MARKELL (acute kidney injury) ([place] [address] ) 02/14/2023   • Chronic kidney disease-mineral and bone disorder 02/14/2023   • Closed fracture of right tibial plateau 98/42/9209   • Stage 3b chronic kidney disease ([place] [address] ) 11/15/2022   • Hyperkalemia 11/15/2022   • Alcoholism ([place] [address] ) 11/15/2022   • Dupuytren's contracture of right hand 06/07/2022   • Type 2 diabetes mellitus with diabetic peripheral angiopathy without gangrene ([facility] ) 02/07/2022   • Physical deconditioning 12/21/2021   • Acquired hypothyroidism 10/18/2021   • Acquired absence of right foot ([facility] ) 03/29/2021   • Persistent proteinuria 01/12/2021   • Abnormal EKG 12/03/2020   • Preoperative examination 12/03/2020   • Insomnia 12/03/2020   • Acute blood loss anemia 10/08/2020   • Hypomagnesemia 09/09/2020   • Urinary retention 09/08/2020   • Thyroid cancer ([facility] ) 08/20/2020   • PAD (peripheral artery disease) ([facility] ) 08/05/2020   • Hyponatremia 08/04/2020   • Health maintenance examination 07/06/2020   • Elevated liver enzymes 10/24/2019   • Colon polyps 09/12/2019   • Right-sided tinnitus 11/26/2018   • Hypertension, essential 09/24/2018   • Gastroesophageal reflux disease without esophagitis 09/24/2018   • Obesity, Class I, BMI 30 0-34 9 (see actual BMI) 03/25/2010   • Hyperlipidemia 12/17/2009   • History of nonadherence to medical treatment 02/20/2008          Vineet Garcia PA-C    I have spent a total time of 35 minutes on 05/17/23 in caring for this patient including Diagnostic results, Instructions for management, Patient and family education, Impressions, Counseling / Coordination of care, Documenting in the medical record, Reviewing / ordering tests, medicine, procedures  , Obtaining or reviewing history   and Communicating with other healthcare professionals   ** Please Note:  voice to text software may have been used in the creation of this document   Although proof errors in transcription or interpretation are a potential of such software**

## 2023-05-17 NOTE — NURSING NOTE
PM+R reported patient c/o chills when in to see and evaluate patient  Patient temp 98 7  Patient given K-pad for warmth per PM+R orders while resting in bed  Primary RN notified

## 2023-05-18 LAB
GLUCOSE SERPL-MCNC: 119 MG/DL (ref 65–140)
GLUCOSE SERPL-MCNC: 142 MG/DL (ref 65–140)
GLUCOSE SERPL-MCNC: 146 MG/DL (ref 65–140)
GLUCOSE SERPL-MCNC: 176 MG/DL (ref 65–140)

## 2023-05-18 PROCEDURE — 97535 SELF CARE MNGMENT TRAINING: CPT

## 2023-05-18 PROCEDURE — 97110 THERAPEUTIC EXERCISES: CPT

## 2023-05-18 PROCEDURE — 82948 REAGENT STRIP/BLOOD GLUCOSE: CPT

## 2023-05-18 PROCEDURE — 99232 SBSQ HOSP IP/OBS MODERATE 35: CPT | Performed by: PHYSICAL MEDICINE & REHABILITATION

## 2023-05-18 PROCEDURE — 97530 THERAPEUTIC ACTIVITIES: CPT

## 2023-05-18 PROCEDURE — 97542 WHEELCHAIR MNGMENT TRAINING: CPT

## 2023-05-18 RX ORDER — SACCHAROMYCES BOULARDII 250 MG
250 CAPSULE ORAL 2 TIMES DAILY
Status: DISCONTINUED | OUTPATIENT
Start: 2023-05-18 | End: 2023-05-30 | Stop reason: HOSPADM

## 2023-05-18 RX ADMIN — ZOLPIDEM TARTRATE 10 MG: 5 TABLET ORAL at 21:05

## 2023-05-18 RX ADMIN — INSULIN GLARGINE 25 UNITS: 100 INJECTION, SOLUTION SUBCUTANEOUS at 21:05

## 2023-05-18 RX ADMIN — CEFAZOLIN SODIUM 2000 MG: 2 SOLUTION INTRAVENOUS at 05:51

## 2023-05-18 RX ADMIN — ESCITALOPRAM OXALATE 5 MG: 5 TABLET, FILM COATED ORAL at 08:17

## 2023-05-18 RX ADMIN — ACETAMINOPHEN 650 MG: 325 TABLET ORAL at 10:11

## 2023-05-18 RX ADMIN — FERROUS SULFATE TAB 325 MG (65 MG ELEMENTAL FE) 325 MG: 325 (65 FE) TAB at 07:14

## 2023-05-18 RX ADMIN — INSULIN LISPRO 20 UNITS: 100 INJECTION, SOLUTION INTRAVENOUS; SUBCUTANEOUS at 17:10

## 2023-05-18 RX ADMIN — LEVOTHYROXINE SODIUM 175 MCG: 75 TABLET ORAL at 05:52

## 2023-05-18 RX ADMIN — INSULIN LISPRO 20 UNITS: 100 INJECTION, SOLUTION INTRAVENOUS; SUBCUTANEOUS at 07:58

## 2023-05-18 RX ADMIN — Medication 3 MG: at 21:05

## 2023-05-18 RX ADMIN — Medication 250 MG: at 09:33

## 2023-05-18 RX ADMIN — INSULIN LISPRO 2 UNITS: 100 INJECTION, SOLUTION INTRAVENOUS; SUBCUTANEOUS at 07:58

## 2023-05-18 RX ADMIN — TAMSULOSIN HYDROCHLORIDE 0.4 MG: 0.4 CAPSULE ORAL at 16:06

## 2023-05-18 RX ADMIN — RIVAROXABAN 10 MG: 10 TABLET, FILM COATED ORAL at 07:13

## 2023-05-18 RX ADMIN — ATORVASTATIN CALCIUM 40 MG: 40 TABLET, FILM COATED ORAL at 08:16

## 2023-05-18 RX ADMIN — PANTOPRAZOLE SODIUM 40 MG: 40 TABLET, DELAYED RELEASE ORAL at 05:52

## 2023-05-18 RX ADMIN — Medication 250 MG: at 17:10

## 2023-05-18 RX ADMIN — INSULIN LISPRO 20 UNITS: 100 INJECTION, SOLUTION INTRAVENOUS; SUBCUTANEOUS at 12:14

## 2023-05-18 RX ADMIN — CEFAZOLIN SODIUM 2000 MG: 2 SOLUTION INTRAVENOUS at 12:25

## 2023-05-18 RX ADMIN — ACETAMINOPHEN 650 MG: 325 TABLET ORAL at 21:05

## 2023-05-18 RX ADMIN — SENNOSIDES AND DOCUSATE SODIUM 1 TABLET: 50; 8.6 TABLET ORAL at 08:16

## 2023-05-18 RX ADMIN — VERAPAMIL HYDROCHLORIDE 360 MG: 180 TABLET ORAL at 21:05

## 2023-05-18 RX ADMIN — CEFAZOLIN SODIUM 2000 MG: 2 SOLUTION INTRAVENOUS at 21:52

## 2023-05-18 NOTE — PROGRESS NOTES
05/18/23 0900   Pain Assessment   Pain Assessment Tool 0-10   Pain Score 6   Pain Location/Orientation Orientation: Right;Location: Knee; Location: Leg   Restrictions/Precautions   Precautions Bed/chair alarms; Fall Risk   RLE Weight Bearing Per Order NWB   RLE ROM Restriction   (only AROM permitted)   Oral Hygiene   Type of Assistance Needed Set-up / clean-up   Physical Assistance Level No physical assistance   Oral Hygiene CARE Score 5   Grooming   Able To Initiate Tasks; Wash/Dry Face;Brush/Clean Teeth   Limitation Noted In Safety;Strength   Shower/Bathe Self   Type of Assistance Needed Physical assistance   Physical Assistance Level 25% or less   Comment assist to rinse buttocks   Shower/Bathe Self CARE Score 3   Bathing   Assessed Bath Style Shower   Anticipated D/C Bath Style Shower;Sponge Bath   Able to Donna Bam No   Able to [employer] Temperature Yes   Able to Wash/Rinse/Dry (body part) Left Arm;Right Arm;L Upper Leg;R Upper Leg;Chest;Abdomen;Perineal Area  (L foot and RLE covered in dressings)   Limitations Noted in Balance; Endurance;ROM;Safety;Strength   Positioning Seated   Adaptive Equipment Shower Bars; Shower Seat;Hand [employer]   Limitations Noted In Balance; Endurance;ROM;Safety;LE Strength   Adaptive Equipment Grab Bars;Seat with out Back   Assessed Shower   Findings CG for sit pivot   Upper Body Dressing   Type of Assistance Needed Set-up / clean-up   Physical Assistance Level No physical assistance   Upper Body Dressing CARE Score 5   Lower Body Dressing   Type of Assistance Needed Physical assistance   Physical Assistance Level 25% or less   Comment assist to doff and don shorts from R foot   Lower Body Dressing CARE Score 3   Putting On/Taking Off Footwear   Type of Assistance Needed Physical assistance   Physical Assistance Level 76% or more   Comment able to present bilat feet to OT to doff/don socks   Putting On/Taking Off Footwear CARE Score 2 Dressing/Undressing Clothing   Remove UB Clothes Pullover Shirt   Don UB Clothes Pullover Shirt   Remove LB Clothes Shorts;Socks   Don LB Clothes Shorts;Socks   Limitations Noted In Balance; Endurance; Safety;Strength;ROM   Positioning Supported Sit   Sit to Lying   Type of Assistance Needed Independent   Physical Assistance Level No physical assistance   Sit to Lying CARE Score 6   Sit to Stand   Type of Assistance Needed Incidental touching   Comment CG   Sit to Stand CARE Score 4   Bed-Chair Transfer   Type of Assistance Needed Incidental touching   Comment CG   Chair/Bed-to-Chair Transfer CARE Score 4   Transfer Bed/Chair/Wheelchair   Limitations Noted In Balance; Endurance;LE Strength   Toileting Hygiene   Type of Assistance Needed Physical assistance   Physical Assistance Level 51%-75%   Comment assist for clothing management   Toileting Hygiene CARE Score 2   Toileting   Able to Pull Clothing down no, up no  Manage Hygiene Bladder; Bowel   Limitations Noted In Balance;ROM;Safety;LE Strength   Adaptive Equipment Grab Bar   Toilet Transfer   Type of Assistance Needed Physical assistance   Physical Assistance Level 25% or less   Comment sit pivot from w/c to toilet   Toilet Transfer CARE Score 3   Toilet Transfer   Surface Assessed Standard Toilet   Transfer Technique Sit Pivot   Limitations Noted In Balance; Endurance; Safety;LE Strength   Adaptive Equipment Grab Bar   Exercise Tools   Other Exercise Tool 1 UE strengthening in bilat UE using 3# dumbbell, x30: elbow flexion/extension, protractoin/retraction, internal/external rotation   Cognition   Overall Cognitive Status WFL   Arousal/Participation Alert; Responsive; Cooperative   Attention Within functional limits   Orientation Level Oriented X4   Memory Within functional limits   Following Commands Follows all commands and directions without difficulty   Assessment   Treatment Assessment Pt agreeable to OT session this AM, though reported fatigue and neuropathy and RLE pain  Received lying supine in bed  ADL session completed; current LOF and details listed in respective sections  Pt is  maxA footwear management, modA toileting, La LB dressing and bathing, set up UB dressing and grooming; fxl transfers La/CG with NWB RLE maintained  Pt took rest breaks as needed during ADL tasks 2* pain and fatigue  Pt returned to supine after ADL for RN and OT to change dressings, then completed UE strengthening exercises with fair tolerance  Pt tearful during session, expressing desire to go home but aware that he would not be successful at home until rehab is completed  OT, RN, and PA provided emotional support as best as able  Pt continues with barriers to d/c of decreased strength throughout but especially RLE s/p tibia fx, decreased balance, NWB RLE, and decreased activity tolerance; all affect independence in self care and fxl transfers  Pt would benefit from continued skilled OT services in order to address listed barriers and prepare for safe d/c  Prognosis Good   Problem List Decreased strength;Decreased range of motion;Decreased endurance; Impaired balance;Decreased safety awareness;Orthopedic restrictions;Pain   Plan   Treatment/Interventions ADL retraining;Functional transfer training;LE strengthening/ROM; Therapeutic exercise; Endurance training;Patient/family training;Equipment eval/education; Compensatory technique education;OT   Progress Progressing toward goals   OT Therapy Minutes   OT Time In 0900   OT Time Out 1101   OT Total Time (minutes) 121   OT Mode of treatment - Individual (minutes) 121

## 2023-05-18 NOTE — PROGRESS NOTES
PM&R PROGRESS NOTE:  Elsa Trevino 59 y o  male MRN: [id number]  Unit/Bed#: -01 Encounter: [id number]        Rehabilitation Diagnosis: Impairment of mobility, safety and Activities of Daily Living (ADLs) due to Orthopedic Disorders:  08 9  Other Orthopedic Closed fracture of medial plataeu of right tibia    HPI: Elsa Trevino is a 59 y o  male with a PMH significant for CKD, Diabetes mellitus, GERD, HLD and HTN who presented to the [facility] on 05/01 for surgical management of a previous tibial plateau fracture  Patient seen in April for the same and opted out of surgical intervention at that time  Now, presented with varus malalignment, instability of the joint and significant comminution and bone void  He was taken to the OR with Dr Nikki Gonzalez on 05/01 and is s/p ORIF to the right tibial plateau  Ordered NWB status post-op for approximately 10-12 weeks with immobilizer in place for 2 weeks post-op  Post-op patient developed urinary retention  Urology consulted  Patient refused straight cath and was following urinary retention protocol to only get casey catheter insertion if needed  Nephrology consulted with CKD history, started on IV fluid resuscitation  Developed hyponatremia  Home HCTZ was held and IVF were stopped  Etiology determined to be SIADH secondary to HCTZ  Once HCTZ was stopped, sodium started to improve  Patient evaluated by PT/OT and deemed appropriate for post acute rehabilitation services  He was accepted to [facility] and arrived on 5/10/23  SUBJECTIVE: Patient seen and evaluated  Patient expressed concerns for losing the RLE  Did explain that patient does have Cellulitis surrounding the incision site  Did inform patient we will continue to monitor the site daily especially with the antibiotic treatment now  Patient requested to be restarted on his Lexapro and became tearful upon evaluation  Continue to provide encouragement and emotional support   He did report b/l feet neuropathy today  States he was on medication management in the past without relief  Does not wish to try anything at this time  ASSESSMENT: Stable, progressing      PLAN:    Rehabilitation  • Functional deficits: impaired mobility, self care, NWB RLE, pain RLE    • Continue current rehabilitation plan of care to maximize function  • Functional update:   o PT:  Roll L-R: independent  Sit-lying: independent   Lying to sitting on side of bed: independent   Sit-stand: cg  Bed-chair transfer: supervision    Ambulation: safety concerns  Wheelchair mobility: supervision   Stairs: safety concerns   o OT:  Oral Hygiene: independent   Toileting hygiene: mod   Toilet transfer: Cg         • Estimated Discharge: 5/26/23 with [facility]/OT/N      Pain  • Tylenol 650 mg Q8H PRN for mild pain  • Robaxin 500 mg Q6H PRN for muscle spasms     DVT prophylaxis  • Lovenox sq inj 40 mg daily     Bladder plan  • Continent     Bowel plan  • Continent   • Last BM 5/15/23  • Continue Senna daily HS    Skin care  · Monitor skin daily  · Apply skin nourishing cream  · Reposition Q2H to offload pressure  · Elevate heels off bed  · Monitor incision site  · Dressings to be changed PRN with soilage and after showering   Apply ABD and ACE wrap     Stage 3b chronic kidney disease [facility])  Assessment & Plan  Lab Results   Component Value Date    EGFR 98 05/17/2023    EGFR 102 05/15/2023    EGFR 107 05/11/2023    CREATININE 0 77 05/17/2023    CREATININE 0 70 05/15/2023    CREATININE 0 63 05/11/2023   · Monitor periodically  · Followed in acute care with Nephrology  · S/P IV Fluids  · HCTZ d/c in setting of hyponatremia     Type 2 diabetes mellitus with diabetic peripheral angiopathy without gangrene [facility])  Assessment & Plan  Lab Results   Component Value Date    HGBA1C 7 9 (H) 04/24/2023       Recent Labs     05/17/23  1202 05/17/23  1608 05/17/23 2005 05/18/23  0743   POCGLU 206* 150* 136 176*       Blood Sugar Average: Last 72 hrs:  (P) 635 [id number]   · Monitor accu cheks and adjust regimen as needed   · Continue Lantus 25 units daily HS, 20 units humalog TID AC, SSI  · Home: Lantus 30 units daily HS, Humalog 20 units TID AC  · Carb controlled diet     Acquired hypothyroidism  Assessment & Plan  · Levothyroxine 150 mcg daily increased to 175 mcg in setting of elevated TSH  · 11/14- 35 400, 5/15- 26 562  · Will need repeat TSH at next PCP follow up      Insomnia  Assessment & Plan  · Continue Ambien 10 mg HS PRN and Melatonin     PAD (peripheral artery disease) ([facility] [address] )  Assessment & Plan  · Continue home Plavix 75 mg daily    Hyponatremia  Assessment & Plan  · Seen by Nephrology in acute care  · Etiology determined to be SIADH secondary to HCTZ  · HCTZ since stopped   · 133 5/17- repeat Monday     Hyperlipidemia  Assessment & Plan  · Continue Lipitor 40 mg     Gastroesophageal reflux disease without esophagitis  Assessment & Plan  · Continue Protonix 40 mg daily  · Home: Prilosec 40 mg daily    Hypertension, essential  Assessment & Plan  · Monitor vitals  · Continue Verapamil 360 mg daily HS    * Closed fracture of medial plateau of right tibia  Assessment & Plan  · Complex history   · Arrived 5/1 for surgical intervention of ongoing right tibial plateau fracture  · Previously in SNF but found to have varus malalignment, instability of the joint, significant comminution and bone void  · S/p ORIF on 05/01 with Dr Shahriar Tello  · NWB for 10-12 weeks  · Immobilizer to remain in place for 2 weeks at all times   · Dressings may be changed as needed; reached out to Ortho to confirm type of dressing change  · Followed up with Dr Savannah Morris on 5/16/23  · For DVT ppx x6 weeks since post-op; patient would like to transition to Oral  Xarelto or Eliquis would be best option   Can price check with pharmacy and adjust    · Continue IV Ancef for Cellulitis at RLE incision site per Ortho recommendations   · Monitor CBC/CMP - will repeat Monday   · Start "probiotic; initially pt refused but eventually became agreeable after benefits discussed  · Cleared for gentle ROM of the knee, ok for AROM, hold PROM at this time   · D/c knee immobilizer  · Follow up in 4 weeks with repeat XR with Ortho  · Acute chomprehensive interdisciplinary inpatient rehabilitation to include intensive skilled therapies as outlines with oversight and management by a rehabilitation Physician Assistant overseen by rehabilitation physician as well as inpatient rehabilitation nursing, case management and weekly interdisciplinary team meeting            Appreciate IM consultants medical co-management  Labs, medications, and imaging personally reviewed  ROS:  A ten point review of systems was completed and pertinent positives are listed in subjective section  All other systems reviewed were negative  Review of Systems   Constitutional: Positive for fatigue  Negative for chills and fever  Respiratory: Negative  Negative for shortness of breath  Cardiovascular: Negative  Negative for chest pain  Gastrointestinal: Negative  Negative for abdominal pain and constipation  Genitourinary: Negative  Negative for difficulty urinating  Musculoskeletal: Positive for myalgias  RLE discomfort   B/l feet neuropathy   Neurological: Negative  Psychiatric/Behavioral: Negative  OBJECTIVE:   /75 (BP Location: Right arm)   Pulse 69   Temp 98 7 °F (37 1 °C) (Temporal)   Resp 20   Ht 6' 4\" (1 93 m)   Wt 123 kg (271 lb 6 2 oz)   SpO2 94%   BMI 33 03 kg/m²     Physical Exam  Vitals reviewed  Constitutional:       General: He is not in acute distress  Appearance: He is not ill-appearing  HENT:      Head: Normocephalic and atraumatic  Eyes:      Pupils: Pupils are equal, round, and reactive to light  Cardiovascular:      Rate and Rhythm: Normal rate and regular rhythm  Pulses: Normal pulses  Heart sounds: Normal heart sounds   No murmur " heard   Pulmonary:      Effort: Pulmonary effort is normal  No respiratory distress  Breath sounds: Normal breath sounds  Abdominal:      General: Bowel sounds are normal  There is no distension  Palpations: Abdomen is soft  Musculoskeletal:         General: Swelling present  Right lower leg: No edema  Left lower leg: No edema  Comments: Ana-incision RLE   Skin:     General: Skin is warm and dry  Findings: Erythema present  Comments: Dressing change with nursing today  Ana-incision with erythema  Mild drainage to medial incision site  Swelling surrounding the incision site    Neurological:      General: No focal deficit present  Mental Status: He is alert and oriented to person, place, and time     Psychiatric:         Mood and Affect: Mood normal          Behavior: Behavior normal           Lab Results   Component Value Date    WBC 10 36 (H) 05/17/2023    HGB 8 4 (L) 05/17/2023    HCT 27 3 (L) 05/17/2023    MCV 95 05/17/2023     (H) 05/17/2023     Lab Results   Component Value Date    SODIUM 133 (L) 05/17/2023    K 3 5 05/17/2023     05/17/2023    CO2 23 05/17/2023    BUN 19 05/17/2023    CREATININE 0 77 05/17/2023    GLUC 232 (H) 05/17/2023    CALCIUM 8 4 05/17/2023     Lab Results   Component Value Date    INR 1 04 08/04/2020    INR 0 99 11/15/2018    PROTIME 13 8 08/04/2020    PROTIME 13 0 11/15/2018           Current Facility-Administered Medications:   •  acetaminophen (TYLENOL) tablet 650 mg, 650 mg, Oral, Q8H PRN, Verneice December PA-C, 650 mg at 05/17/23 1937  •  atorvastatin (LIPITOR) tablet 40 mg, 40 mg, Oral, QAM, Verneice December, PA-C, 40 mg at 05/18/23 0816  •  ceFAZolin (ANCEF) IVPB (premix in dextrose) 2,000 mg 50 mL, 2,000 mg, Intravenous, Q8H, Verneice December, PA-C, Last Rate: 100 mL/hr at 05/18/23 0551, 2,000 mg at 05/18/23 0551  •  escitalopram (LEXAPRO) tablet 5 mg, 5 mg, Oral, Daily, Verneice December, PA-C, 5 mg at 05/18/23 5889  •  ferrous sulfate tablet 325 mg, 325 mg, Oral, Daily With Breakfast, Emeterio Randhawa, RICARDOJOSE L, 325 mg at 05/18/23 9359  •  insulin glargine (LANTUS) subcutaneous injection 25 Units 0 25 mL, 25 Units, Subcutaneous, HS, Coreen Temple PA-C, 25 Units at 05/17/23 2102  •  insulin lispro (HumaLOG) 100 units/mL subcutaneous injection 2-12 Units, 2-12 Units, Subcutaneous, 4x Daily (AC & HS), 2 Units at 05/18/23 0758 **AND** Fingerstick Glucose (POCT), , , 4x Daily AC and at bedtime, Coreen Temple PA-C  •  insulin lispro (HumaLOG) 100 units/mL subcutaneous injection 20 Units, 20 Units, Subcutaneous, TID With Meals, Coreen Temple PA-C, 20 Units at 05/18/23 1480  •  levothyroxine tablet 175 mcg, 175 mcg, Oral, Early Morning, Coreen Temple PA-C, 175 mcg at 05/18/23 1075  •  magnesium hydroxide (MILK OF MAGNESIA) oral suspension 30 mL, 30 mL, Oral, Daily PRN, Coreen Temple PA-C  •  melatonin tablet 3 mg, 3 mg, Oral, HS, Carlin Gustafson MD, 3 mg at 05/17/23 2102  •  methocarbamol (ROBAXIN) tablet 500 mg, 500 mg, Oral, Q6H PRN, Coreen Temple PA-C  •  ondansetron (ZOFRAN-ODT) dispersible tablet 4 mg, 4 mg, Oral, Q6H PRN, Coreen Temple PA-C  •  pantoprazole (PROTONIX) EC tablet 40 mg, 40 mg, Oral, Early Morning, Coreen Temple PA-C, 40 mg at 05/18/23 3922  •  rivaroxaban (XARELTO) tablet 10 mg, 10 mg, Oral, Daily With Breakfast, Coreen Temple PA-C, 10 mg at 05/18/23 0848  •  saccharomyces boulardii (FLORASTOR) capsule 250 mg, 250 mg, Oral, BID, Coreen Temple PA-C  •  senna-docusate sodium (SENOKOT S) 8 6-50 mg per tablet 1 tablet, 1 tablet, Oral, Daily, Coreen Temple PA-C, 1 tablet at 05/18/23 5940  •  tamsulosin (FLOMAX) capsule 0 4 mg, 0 4 mg, Oral, Daily With Rashaadarmida Winn PA-C, 0 4 mg at 05/17/23 1622  •  verapamil (CALAN-SR) CR tablet 360 mg, 360 mg, Oral, HS, Coreen Temple PA-C, 360 mg at 05/17/23 2102  •  zolpidem (AMBIEN) tablet 10 mg, 10 mg, Oral, HS PRN, Coreen Temple PA-C, 10 mg at 05/17/23 2102    Past Medical History:   Diagnosis Date   • Broken internal right knee prosthesis ([facility] [address] ) 01/2023   • Chronic kidney disease    • Colon polyp    • Constipation 03/09/2023   • Diabetes mellitus ([facility] )    • Disease of thyroid gland    • GERD (gastroesophageal reflux disease)    • HHS vs DKA 11/16/2018   • Hyperlipidemia    • Hypertension    • Thyroid cancer [facility])        Patient Active Problem List    Diagnosis Date Noted   • MANNY (obstructive sleep apnea) 05/01/2023   • Closed fracture of medial plateau of right tibia 04/13/2023   • Depression and anxiety 03/03/2023   • Acquired genu varum of right lower extremity 02/21/2023   • MARKELL (acute kidney injury) ([facility] ) 02/14/2023   • Chronic kidney disease-mineral and bone disorder 02/14/2023   • Closed fracture of right tibial plateau 92/09/3055   • Stage 3b chronic kidney disease ([facility]  ) 11/15/2022   • Hyperkalemia 11/15/2022   • Alcoholism ([place] [address] ) 11/15/2022   • Dupuytren's contracture of right hand 06/07/2022   • Type 2 diabetes mellitus with diabetic peripheral angiopathy without gangrene ([facility] ) 02/07/2022   • Physical deconditioning 12/21/2021   • Acquired hypothyroidism 10/18/2021   • Acquired absence of right foot ([facility] ) 03/29/2021   • Persistent proteinuria 01/12/2021   • Abnormal EKG 12/03/2020   • Preoperative examination 12/03/2020   • Insomnia 12/03/2020   • Acute blood loss anemia 10/08/2020   • Hypomagnesemia 09/09/2020   • Urinary retention 09/08/2020   • Thyroid cancer ([facility] ) 08/20/2020   • PAD (peripheral artery disease) ([facility] ) 08/05/2020   • Hyponatremia 08/04/2020   • Health maintenance examination 07/06/2020   • Elevated liver enzymes 10/24/2019   • Colon polyps 09/12/2019   • Right-sided tinnitus 11/26/2018   • Hypertension, essential 09/24/2018   • Gastroesophageal reflux disease without esophagitis 09/24/2018   • Obesity, Class I, BMI 30 0-34 9 (see actual BMI) 03/25/2010   • Hyperlipidemia 12/17/2009   • History of nonadherence to medical treatment 02/20/2008          Ryne Valdivia GABRIEL Guerrier    I have spent a total time of 35 minutes on 05/18/23 in caring for this patient including Diagnostic results, Prognosis, Instructions for management, Patient and family education, Impressions, Counseling / Coordination of care, Documenting in the medical record, Reviewing / ordering tests, medicine, procedures  , Obtaining or reviewing history   and Communicating with other healthcare professionals   ** Please Note:  voice to text software may have been used in the creation of this document   Although proof errors in transcription or interpretation are a potential of such software**

## 2023-05-18 NOTE — PROGRESS NOTES
"   05/18/23 1230   Pain Assessment   Pain Assessment Tool 0-10   Pain Score 5   Pain Location/Orientation Orientation: Right;Location: Knee   Pain Radiating Towards Pain radiates down the R leg   Pain Onset/Description Onset: Ongoing; Descriptor: Throbbing; Descriptor: Stabbing; Descriptor: Aching   Effect of Pain on Daily Activities Decreased mobility   Patient's Stated Pain Goal No pain   Hospital Pain Intervention(s) Medication (See MAR); Repositioned   Restrictions/Precautions   Precautions Bed/chair alarms; Fall Risk   Weight Bearing Restrictions Yes   RLE Weight Bearing Per Order NWB   ROM Restrictions Yes   RLE ROM Restriction   (Only AROM R LE)   Braces or Orthoses   (knee immobilizer no longer needed)   Cognition   Overall Cognitive Status [facility]   Arousal/Participation Alert; Responsive   Attention Within functional limits   Orientation Level Oriented X4   Memory Within functional limits   Following Commands Follows all commands and directions without difficulty   Comments Flat affect  Needs encouragement to participate  Subjective   Subjective \"I'm not doing anything with my R leg  \"   Roll Left and Right   Type of Assistance Needed Independent   Physical Assistance Level No physical assistance   Comment bedrail, HOB elevated   Roll Left and Right CARE Score 6   Sit to Lying   Type of Assistance Needed Independent   Physical Assistance Level No physical assistance   Comment HOB elevated, bedrail   Sit to Lying CARE Score 6   Lying to Sitting on Side of Bed   Type of Assistance Needed Independent   Physical Assistance Level No physical assistance   Comment HOB elevated, bedrail   Lying to Sitting on Side of Bed CARE Score 6   Bed-Chair Transfer   Type of Assistance Needed Supervision   Physical Assistance Level No physical assistance   Comment sit pivot from bed <-> w/c   Chair/Bed-to-Chair Transfer CARE Score 4   Transfer Bed/Chair/Wheelchair   Limitations Noted In Balance;Confidence;Pain Management;LE " Strength;UE Strength   Sit Pivot Supervision   Findings Pt completed sit pivot from bed <-> w/c leading towards stronger side  Discussed use of sliding board for car transfers  Pt receptive, but reported that he was too tired and did not want to attempt transfer with the sliding board  Car Transfer   Reason if not Attempted Environmental limitations   Car Transfer CARE Score 10   Walk 10 Feet   Reason if not Attempted Safety concerns   Walk 10 Feet CARE Score 88   Walk 50 Feet with Two Turns   Reason if not Attempted Safety concerns   Walk 50 Feet with Two Turns CARE Score 88   Walk 150 Feet   Reason if not Attempted Safety concerns   Walk 150 Feet CARE Score 88   Walking 10 Feet on Uneven Surfaces   Reason if not Attempted Safety concerns   Walking 10 Feet on Uneven Surfaces CARE Score 88   Ambulation   Does the patient walk? 0  No, and walking goal is not clinically indicated  Wheel 50 Feet with Two Turns   Type of Assistance Needed Supervision   Physical Assistance Level No physical assistance   Comment level surface   Wheel 50 Feet with Two Turns CARE Score 4   Wheel 150 Feet   Type of Assistance Needed Supervision   Physical Assistance Level No physical assistance   Comment Level surface   Wheel 150 Feet CARE Score 4   Wheelchair mobility   Does the patient use a wheelchair? 1  Yes   Type of Wheelchair Used 1  Manual   Method Right upper extremity; Left upper extremity   Assistance Provided For Remove Leg Rest   Distance Level Surface (feet) 189 ft   Findings Pt slow with w/c mobility  Required frequent rest breaks  Reporting fatigue in his UEs     Curb or Single Stair   Reason if not Attempted Safety concerns   1 Step (Curb) CARE Score 88   4 Steps   Reason if not Attempted Safety concerns   4 Steps CARE Score 88   12 Steps   Reason if not Attempted Safety concerns   12 Steps CARE Score 88   Picking Up Object   Reason if not Attempted Safety concerns   Picking Up Object CARE Score 88   Therapeutic Interventions   Strengthening Supine ex, seated exercises   Balance tranfser training, bed mobility   Other w/c mobility for technique and UE strengthening   Assessment   Treatment Assessment Pt agreeable to therapy  Reports fatigue and pain in the R knee and lower leg  Pt is fearful of completing ROM with the R LE due to fear of injury  Pt completed sit pivot transfers to and from the w/c with S  Also discussed use of sliding board for car transfers  Pt receptive, but did not feel up to completing sliding board transfers today  Pt completed w/c mobility with S  He required frequent rest breaks due to reports of fatigue  The pt will benefit from continued PT to progress transfers, bed mobility, strengthening, w/c mobility, endurance, and safety in order to maximize function and decrease risk for falls  Problem List Decreased strength;Decreased endurance; Impaired balance;Decreased mobility;Pain;Orthopedic restrictions   Barriers to Discharge Inaccessible home environment   PT Barriers   Physical Impairment Decreased strength;Decreased endurance; Impaired balance;Decreased mobility;Pain;Orthopedic restrictions   Functional Limitation Stair negotiation;Standing;Transfers; Walking; Wheelchair management   Plan   Treatment/Interventions Functional transfer training;LE strengthening/ROM; Therapeutic exercise; Endurance training;Patient/family training;Bed mobility   Progress Progressing toward goals   PT Therapy Minutes   PT Time In 1230   PT Time Out 1400   PT Total Time (minutes) 90   PT Mode of treatment - Individual (minutes) 90   PT Mode of treatment - Concurrent (minutes) 0   PT Mode of treatment - Group (minutes) 0   PT Mode of treatment - Co-treat (minutes) 0   PT Mode of Treatment - Total time(minutes) 90 minutes   PT Cumulative Minutes 511   Therapy Time missed   Time missed?  No     Seated: 2# L LE, 0# R LE  R LAQs x 20  L hip hikes x 30  L hamstring curls with manual resistance x 30  L hip abduction with 2# x 30  L APs x 30    Supine: 2# L LE, 0# R LE  R hip abduction with support of R LE x 10  R quad sets x 3  L heel slides x 30  L hip abduction x 30  L SAQs x 30  L SLR x 30 (0#)    Pt slow to complete exercises  Completes in short increments (3-5 at a time)

## 2023-05-18 NOTE — NURSING NOTE
Dressing changes to right leg incisional area, left shin and left foot as ordered  See flow sheets  Toleraating therapy  Refused to get OOB to chair for meals  Continue same plan of care

## 2023-05-18 NOTE — NUTRITION
23 1416   Biochemical Data,Medical Tests, and Procedures   Biochemical Data/Medical Tests/Procedures Lab values reviewed; Meds reviewed   Labs (Comment)  Na:133, B, WBC:10 36, H&H:8  3   A1c:7 9%  BG often elevated   Meds (Comment) lipitor, ancef, lexapro, FeSO4, lantus, humalog, levothyroxine, MOM, melatonin, zofran, protonix, florastor, ambien   Nutrition-Focused Physical Exam   Nutrition-Focused Physical Exam Findings RN skin assessment reviewed  (Diabetic ulcer left foot, wound left foot, wound right leg, wound left pretibial per documentation )   Nutrition-Focused Physical Exam Findings +2 RLE edema, trace LLE edema noted  LBM   Patient reports poor dentition  Medical-Related Concerns CKD, constipation, DM, disease of thyroid gland, GERD, HLD, HTN, thyroid cancer   Adequacy of Intake   Nutrition Modality PO   Feeding Route   PO Independent   Current PO Intake   Current Diet Order CCD 1 diet thin liquids   Current Meal Intake %   Estimated calorie intake compared to estimated need Nutrient needs met  PES Statement   Problem Clinical   Biochemical (2) Altered nutrition-related laboratory values (specify) NC-2 2  (A1c%)   Related to Other (Comment)  (lack of CHO controlled diet )   As evidenced by: Abnormal lab (Specify); Per patient/family interview  (A1c:7 9%)   Recommendations/Interventions   Malnutrition/BMI Present No  (does not meet criteria)   Summary Length of stay  CCD 1 diet thin liquids  Meal completions %  Patient states his appetite is good  He reports no diet plan at home  He states his wife cooks  He states he consumes 2 meals per day  He reports his wife prepares a lot of [employer] food and he tries to watch his portion sizes  He states he checks his BG TID  He reports taking his insulin as prescribed  5/15/#; #; #, 13#(5%) loss in 3 months; 11/15/#  No significant weight changes   Diabetic ulcer left foot, wound left foot, wound right leg, wound left pretibial per documentation  +2 RLE edema, trace LLE edema noted  LBM 5/18  Patient reports poor dentition  Patient reports he his is satisifed with amout of CHO permitted per meal    Interventions/Recommendations Continue current diet order   Education Assessment   Education Education initiated/ completed   Education Notes Provided CHO Counting for People with DM and Using Nutrition Facts Label CHO handouts  Reviewed amount of CHO permitted per meal    Patient Nutrition Goals   Goal Glucose WNL; Comprehend education   Goal Status Initiated   Timeframe to complete goal by next f/u   Nutrition Complexity Risk   Nutrition complexity level Low risk   Follow up date 05/26/23

## 2023-05-19 LAB
GLUCOSE SERPL-MCNC: 162 MG/DL (ref 65–140)
GLUCOSE SERPL-MCNC: 165 MG/DL (ref 65–140)
GLUCOSE SERPL-MCNC: 189 MG/DL (ref 65–140)
GLUCOSE SERPL-MCNC: 238 MG/DL (ref 65–140)

## 2023-05-19 PROCEDURE — 97110 THERAPEUTIC EXERCISES: CPT | Performed by: PHYSICAL THERAPIST

## 2023-05-19 PROCEDURE — 97542 WHEELCHAIR MNGMENT TRAINING: CPT | Performed by: PHYSICAL THERAPIST

## 2023-05-19 PROCEDURE — 82948 REAGENT STRIP/BLOOD GLUCOSE: CPT

## 2023-05-19 PROCEDURE — 97530 THERAPEUTIC ACTIVITIES: CPT

## 2023-05-19 PROCEDURE — 97110 THERAPEUTIC EXERCISES: CPT

## 2023-05-19 PROCEDURE — 97535 SELF CARE MNGMENT TRAINING: CPT

## 2023-05-19 PROCEDURE — 97530 THERAPEUTIC ACTIVITIES: CPT | Performed by: PHYSICAL THERAPIST

## 2023-05-19 PROCEDURE — 99232 SBSQ HOSP IP/OBS MODERATE 35: CPT | Performed by: FAMILY MEDICINE

## 2023-05-19 RX ADMIN — INSULIN LISPRO 20 UNITS: 100 INJECTION, SOLUTION INTRAVENOUS; SUBCUTANEOUS at 12:05

## 2023-05-19 RX ADMIN — INSULIN LISPRO 2 UNITS: 100 INJECTION, SOLUTION INTRAVENOUS; SUBCUTANEOUS at 21:00

## 2023-05-19 RX ADMIN — TAMSULOSIN HYDROCHLORIDE 0.4 MG: 0.4 CAPSULE ORAL at 17:03

## 2023-05-19 RX ADMIN — CEFAZOLIN SODIUM 2000 MG: 2 SOLUTION INTRAVENOUS at 13:29

## 2023-05-19 RX ADMIN — INSULIN LISPRO 2 UNITS: 100 INJECTION, SOLUTION INTRAVENOUS; SUBCUTANEOUS at 17:00

## 2023-05-19 RX ADMIN — FERROUS SULFATE TAB 325 MG (65 MG ELEMENTAL FE) 325 MG: 325 (65 FE) TAB at 08:19

## 2023-05-19 RX ADMIN — Medication 3 MG: at 21:00

## 2023-05-19 RX ADMIN — INSULIN LISPRO 20 UNITS: 100 INJECTION, SOLUTION INTRAVENOUS; SUBCUTANEOUS at 08:19

## 2023-05-19 RX ADMIN — LEVOTHYROXINE SODIUM 175 MCG: 75 TABLET ORAL at 05:54

## 2023-05-19 RX ADMIN — INSULIN LISPRO 2 UNITS: 100 INJECTION, SOLUTION INTRAVENOUS; SUBCUTANEOUS at 08:00

## 2023-05-19 RX ADMIN — ESCITALOPRAM OXALATE 5 MG: 5 TABLET, FILM COATED ORAL at 08:19

## 2023-05-19 RX ADMIN — CEFAZOLIN SODIUM 2000 MG: 2 SOLUTION INTRAVENOUS at 05:55

## 2023-05-19 RX ADMIN — CEFAZOLIN SODIUM 2000 MG: 2 SOLUTION INTRAVENOUS at 20:59

## 2023-05-19 RX ADMIN — PANTOPRAZOLE SODIUM 40 MG: 40 TABLET, DELAYED RELEASE ORAL at 05:54

## 2023-05-19 RX ADMIN — INSULIN GLARGINE 25 UNITS: 100 INJECTION, SOLUTION SUBCUTANEOUS at 21:00

## 2023-05-19 RX ADMIN — INSULIN LISPRO 20 UNITS: 100 INJECTION, SOLUTION INTRAVENOUS; SUBCUTANEOUS at 17:03

## 2023-05-19 RX ADMIN — RIVAROXABAN 10 MG: 10 TABLET, FILM COATED ORAL at 08:19

## 2023-05-19 RX ADMIN — ZOLPIDEM TARTRATE 10 MG: 5 TABLET ORAL at 20:59

## 2023-05-19 RX ADMIN — INSULIN LISPRO 4 UNITS: 100 INJECTION, SOLUTION INTRAVENOUS; SUBCUTANEOUS at 12:05

## 2023-05-19 RX ADMIN — ACETAMINOPHEN 650 MG: 325 TABLET ORAL at 20:59

## 2023-05-19 RX ADMIN — Medication 250 MG: at 17:03

## 2023-05-19 RX ADMIN — Medication 250 MG: at 08:19

## 2023-05-19 RX ADMIN — ATORVASTATIN CALCIUM 40 MG: 40 TABLET, FILM COATED ORAL at 08:19

## 2023-05-19 RX ADMIN — VERAPAMIL HYDROCHLORIDE 360 MG: 180 TABLET ORAL at 21:00

## 2023-05-19 NOTE — PROGRESS NOTES
05/19/23 0900   Pain Assessment   Pain Assessment Tool 0-10   Pain Score 5   Pain Location/Orientation Orientation: Right;Location: Knee   Restrictions/Precautions   Precautions Bed/chair alarms; Fall Risk  (Strict NWBing R LE)   Weight Bearing Restrictions Yes   RLE Weight Bearing Per Order NWB   RLE ROM Restriction   (AROM only R LE)   General   Change In Medical/Functional Status Addition of antibiotics after Tuesday ortho appointment   Cognition   Overall Cognitive Status WFL   Subjective   Subjective I want this bandage changed twice a day     Roll Left and Right   Type of Assistance Needed Independent   Physical Assistance Level No physical assistance   Comment Bed rails   Roll Left and Right CARE Score 6   Sit to Lying   Type of Assistance Needed Independent   Physical Assistance Level No physical assistance   Comment No bed rails required   Sit to Lying CARE Score 6   Lying to Sitting on Side of Bed   Type of Assistance Needed Independent   Physical Assistance Level No physical assistance   Comment No bed rails required   Lying to Sitting on Side of Bed CARE Score 6   Sit to Stand   Comment Repetitive sit to stands from edge of plinth with LiteGait system to ensure complete NWBing R LE with transition to stand  (Three trials completed)   Bed-Chair Transfer   Type of Assistance Needed Supervision   Physical Assistance Level No physical assistance   Comment Sit pivot from bed to/from wheelchair and plinth   Chair/Bed-to-Chair Transfer CARE Score 4   Car Transfer   Reason if not Attempted Environmental limitations   Car Transfer CARE Score 10   Walk 10 Feet   Reason if not Attempted Safety concerns   Walk 10 Feet CARE Score 88   Walk 50 Feet with Two Turns   Reason if not Attempted Safety concerns   Walk 50 Feet with Two Turns CARE Score 88   Walk 150 Feet   Reason if not Attempted Safety concerns   Walk 150 Feet CARE Score 88   Walking 10 Feet on Uneven Surfaces   Reason if not Attempted Safety concerns Walking 10 Feet on Uneven Surfaces CARE Score 88   Ambulation   Does the patient walk? 0  No, and walking goal is not clinically indicated  Wheel 50 Feet with Two Turns   Type of Assistance Needed Independent   Physical Assistance Level No physical assistance   Wheel 50 Feet with Two Turns CARE Score 6   Wheel 150 Feet   Type of Assistance Needed Supervision   Physical Assistance Level No physical assistance   Wheel 150 Feet CARE Score 4   Wheelchair mobility   Does the patient use a wheelchair? 1  Yes   Type of Wheelchair Used 1  Manual   Method Right upper extremity; Left upper extremity   Distance Level Surface (feet) 250 ft   Findings Slow turns, frequent rest periods during propulsion   Curb or Single Stair   Reason if not Attempted Safety concerns   1 Step (Curb) CARE Score 88   4 Steps   Reason if not Attempted Safety concerns   4 Steps CARE Score 88   12 Steps   Reason if not Attempted Safety concerns   12 Steps CARE Score 88   Picking Up Object   Reason if not Attempted Safety concerns   Picking Up Object CARE Score 88   Toilet Transfer   Comment No required   Therapeutic Interventions   Strengthening Supine LE TE   Other Sit to stand trials, static standing tolerance   Assessment   Treatment Assessment Patient agreeable to PT this a m  Reports still fatigued and has moderate pain this date  Overall pain spikes to increased levels periodically, but patient does not want to use heavier narcotics at this time  Patient remains motivated to progress with NWBIng right LE maintained  Continues to fatigue quickly and requires frequent and prolonged rest periods during session  Sit to stand trials with therapeutic rest periods between trials  After completion, required increased rest to ensure activity tolerance to allow for safe and supervised transfer from plinth to wheelchair  Continues to be motivated and works to his abilities during session  Cont per POC and progress as tolerated     Problem List Decreased strength;Decreased endurance; Impaired balance;Decreased mobility;Pain;Orthopedic restrictions   PT Barriers   Physical Impairment Decreased strength;Decreased endurance; Impaired balance;Decreased mobility;Pain;Orthopedic restrictions   Functional Limitation Stair negotiation;Standing;Transfers; Walking; Wheelchair management   Plan   Treatment/Interventions Functional transfer training;LE strengthening/ROM; Elevations; Therapeutic exercise; Endurance training;Patient/family training;Equipment eval/education; Bed mobility;Gait training   Progress Slow progress, decreased activity tolerance   PT Therapy Minutes   PT Time In 0900   PT Time Out 1030   PT Total Time (minutes) 90   PT Mode of treatment - Individual (minutes) 90   PT Mode of treatment - Concurrent (minutes) 0   PT Mode of treatment - Group (minutes) 0   PT Mode of treatment - Co-treat (minutes) 0   PT Mode of Treatment - Total time(minutes) 90 minutes   PT Cumulative Minutes 601     Supine LE: AROM with R LE to include submax QS, GS, SLR flexion, hip IR/ER  Patient returned to supine, all needs in reach  Alarm in place and activated  Encouraged use of call bell, patient verbalizes understanding

## 2023-05-19 NOTE — WOUND OSTOMY CARE
"Progress Note - Wound   Billie Rizvi 59 y o  male MRN: [id number]  Unit/Bed#: -01 Encounter: [id number]        Assessment:   Seen today for weekly wound assessment follow up  Pt in bed, initially not agreeable to wound assessment for measurement and photos  \"She just did it, there is nothing wrong with my left leg, that's the wrong leg\"  Did agree eventually to photo and measurements to update medical record  Photo and details below  1)Left plantar foot wound, pink with a few granular buds  50% wound bed white fibrous slough  Wound is circular in shape with well defined white macerated edges  2)Anterior shin wound is resolving , decreased in size with epimerization and scarring noted  No induration, fluctuance, odor, warmth/temperature differences, redness, or purulence noted to the above noted wounds and skin areas assessed  New dressings applied per orders listed below  Patient tolerated well- no s/s of non-verbal pain or discomfort observed during the encounter  Bedside nurse aware of plan of care  See flow sheets for more detailed assessment findings  Wound care will continue to follow  Wound 05/08/23 Pretibial Left (Active)   Wound Image   05/19/23 1312   Wound Description Epithelialization;Pale;Pink; White 05/19/23 1312   Ana-wound Assessment Scar Tissue 05/19/23 1312   Wound Length (cm) 5 5 cm 05/19/23 1312   Wound Width (cm) 0 9 cm 05/19/23 1312   Wound Depth (cm) 0 1 cm 05/19/23 1312   Wound Surface Area (cm^2) 4 95 cm^2 05/19/23 1312   Wound Volume (cm^3) 0 495 cm^3 05/19/23 1312   Calculated Wound Volume (cm^3) 0 5 cm^3 05/19/23 1312   Change in Wound Size % 0 05/19/23 1312   Drainage Amount Scant 05/19/23 1312   Drainage Description Clear;Yellow 05/19/23 1312   Non-staged Wound Description Partial thickness 05/19/23 1312   Treatments Cleansed;Site care 05/19/23 1312   Dressing Jerene Soham gauze;Dry dressing 05/19/23 1312   Dressing Changed Reinforced 05/19/23 1312   Patient " Tolerance Tolerated well 05/19/23 1312   Dressing Status Reinforced 05/19/23 1312       Wound 05/10/23 Diabetic Ulcer Other (Comment) Foot Left (Active)   Wound Image   05/19/23 1311   Wound Description Fragile;Pink;Pale;Tan;Slough 05/19/23 1311   Ana-wound Assessment Pink;Pale;Scar Tissue 05/19/23 1311   Wound Length (cm) 1 8 cm 05/19/23 1311   Wound Width (cm) 1 6 cm 05/19/23 1311   Wound Depth (cm) 0 1 cm 05/19/23 1311   Wound Surface Area (cm^2) 2 88 cm^2 05/19/23 1311   Wound Volume (cm^3) 0 288 cm^3 05/19/23 1311   Calculated Wound Volume (cm^3) 0 29 cm^3 05/19/23 1311   Drainage Amount Scant 05/19/23 1311   Drainage Description Yellow 05/19/23 1311   Non-staged Wound Description Full thickness 05/19/23 1311   Treatments Cleansed;Site care 05/19/23 1311   Dressing Calcium Alginate with Silver;Dry dressing 05/19/23 1311   Dressing Changed Reinforced 05/19/23 1311   Patient Tolerance Tolerated well 05/19/23 1311   Dressing Status Reinforced 05/19/23 1311     Call or tigertext with any questions  Wound Care will continue to follow    Clara MARTINEZ RN [facility], [facility]

## 2023-05-19 NOTE — PLAN OF CARE
Problem: PAIN - ADULT  Goal: Verbalizes/displays adequate comfort level or baseline comfort level  Description: Interventions:  - Encourage patient to monitor pain and request assistance  - Assess pain using appropriate pain scale  - Administer analgesics based on type and severity of pain and evaluate response  - Implement non-pharmacological measures as appropriate and evaluate response  - Consider cultural and social influences on pain and pain management  - Notify physician/advanced practitioner if interventions unsuccessful or patient reports new pain  Outcome: Progressing     Problem: INFECTION - ADULT  Goal: Absence or prevention of progression during hospitalization  Description: INTERVENTIONS:  - Assess and monitor for signs and symptoms of infection  - Monitor lab/diagnostic results  - Monitor all insertion sites, i e  indwelling lines, tubes, and drains  - Monitor endotracheal if appropriate and nasal secretions for changes in amount and color  - [place] appropriate cooling/warming therapies per order  - Administer medications as ordered  - Instruct and encourage patient and family to use good hand hygiene technique  - Identify and instruct in appropriate isolation precautions for identified infection/condition  Outcome: Progressing  Goal: Absence of fever/infection during neutropenic period  Description: INTERVENTIONS:  - Monitor WBC    Outcome: Progressing     Problem: DISCHARGE PLANNING  Goal: Discharge to home or other facility with appropriate resources  Description: INTERVENTIONS:  - Identify barriers to discharge w/patient and caregiver  - Arrange for needed discharge resources and transportation as appropriate  - Identify discharge learning needs (meds, wound care, etc )  - Arrange for interpretive services to assist at discharge as needed  - Refer to Case Management Department for coordinating discharge planning if the patient needs post-hospital services based on physician/advanced practitioner order or complex needs related to functional status, cognitive ability, or social support system  Outcome: Progressing

## 2023-05-19 NOTE — PROGRESS NOTES
PM&R PROGRESS NOTE:  Christina Rivers 59 y o  male MRN: [id number]  Unit/Bed#: -01 Encounter: [id number]        Rehabilitation Diagnosis: Impairment of mobility, safety and Activities of Daily Living (ADLs) due to Orthopedic Disorders:  08 9  Other Orthopedic Closed fracture of medial plataeu of right tibia    HPI: Christina Rivers is a 59 y o  male with a PMH significant for CKD, Diabetes mellitus, GERD, HLD and HTN who presented to the [employer] [facility] [place] on 05/01 for surgical management of a previous tibial plateau fracture  Patient seen in April for the same and opted out of surgical intervention at that time  Now, presented with varus malalignment, instability of the joint and significant comminution and bone void  He was taken to the OR with Dr Frederick Pabon on 05/01 and is s/p ORIF to the right tibial plateau  Ordered NWB status post-op for approximately 10-12 weeks with immobilizer in place for 2 weeks post-op  Post-op patient developed urinary retention  Urology consulted  Patient refused straight cath and was following urinary retention protocol to only get casey catheter insertion if needed  Nephrology consulted with CKD history, started on IV fluid resuscitation  Developed hyponatremia  Home HCTZ was held and IVF were stopped  Etiology determined to be SIADH secondary to HCTZ  Once HCTZ was stopped, sodium started to improve  Patient evaluated by PT/OT and deemed appropriate for post acute rehabilitation services  He was accepted to [facility] and arrived on 5/10/23  SUBJECTIVE: Patient seen and evaluated  No complaints other than since his dressings get saturated he would like to have them changed twice a day on a regular basis    ASSESSMENT: Stable, progressing      PLAN:    Rehabilitation  • Functional deficits: impaired mobility, self care, NWB RLE, pain RLE    • Continue current rehabilitation plan of care to maximize function      • Functional update:   o PT:  Roll LJessicaR: independent  Sit-lying: independent   Lying to sitting on side of bed: independent   Sit-stand: cg  Bed-chair transfer: supervision    Ambulation: safety concerns  Wheelchair mobility: supervision   Stairs: safety concerns   o OT:  Oral Hygiene: independent   Toileting hygiene: mod   Toilet transfer: Cg         • Estimated Discharge: 5/26/23 with [facility] PT/OT/N      Pain  • Tylenol 650 mg Q8H PRN for mild pain  • Robaxin 500 mg Q6H PRN for muscle spasms     DVT prophylaxis  • Lovenox sq inj 40 mg daily     Bladder plan  • Continent     Bowel plan  • Continent   • Last BM 5/15/23  • Continue Senna daily HS    Skin care  · Monitor skin daily  · Apply skin nourishing cream  · Reposition Q2H to offload pressure  · Elevate heels off bed  · Monitor incision site  · Dressings to be changed PRN with soilage and after showering   Apply ABD and ACE wrap     Stage 3b chronic kidney disease [facility])  Assessment & Plan  Lab Results   Component Value Date    EGFR 98 05/17/2023    EGFR 102 05/15/2023    EGFR 107 05/11/2023    CREATININE 0 77 05/17/2023    CREATININE 0 70 05/15/2023    CREATININE 0 63 05/11/2023   · Monitor periodically  · Followed in acute care with Nephrology  · S/P IV Fluids  · HCTZ d/c in setting of hyponatremia     Type 2 diabetes mellitus with diabetic peripheral angiopathy without gangrene [facility])  Assessment & Plan  Lab Results   Component Value Date    HGBA1C 7 9 (H) 04/24/2023       Recent Labs     05/17/23  1202 05/17/23  1608 05/17/23 2005 05/18/23  0743   POCGLU 206* 150* 136 176*       Blood Sugar Average: Last 72 hrs:  (P) 559 [id number]   · Monitor accu cheks and adjust regimen as needed   · Continue Lantus 25 units daily HS, 20 units humalog TID AC, SSI  · Home: Lantus 30 units daily HS, Humalog 20 units TID AC  · Carb controlled diet     Acquired hypothyroidism  Assessment & Plan  · Levothyroxine 150 mcg daily increased to 175 mcg in setting of elevated TSH  · 11/14- 35 400, 5/15- 26 562  · Will need repeat TSH at next PCP follow up      Insomnia  Assessment & Plan  · Continue Ambien 10 mg HS PRN and Melatonin     PAD (peripheral artery disease) ([facility])  Assessment & Plan  · Continue home Plavix 75 mg daily    Hyponatremia  Assessment & Plan  · Seen by Nephrology in acute care  · Etiology determined to be SIADH secondary to HCTZ  · HCTZ since stopped   · 133 5/17- repeat Monday     Hyperlipidemia  Assessment & Plan  · Continue Lipitor 40 mg     Gastroesophageal reflux disease without esophagitis  Assessment & Plan  · Continue Protonix 40 mg daily  · Home: Prilosec 40 mg daily    Hypertension, essential  Assessment & Plan  · Monitor vitals  · Continue Verapamil 360 mg daily HS    * Closed fracture of medial plateau of right tibia  Assessment & Plan  · Complex history   · Arrived 5/1 for surgical intervention of ongoing right tibial plateau fracture  · Previously in SNF but found to have varus malalignment, instability of the joint, significant comminution and bone void  · S/p ORIF on 05/01 with Dr Nino Chu  · NWB for 10-12 weeks  · Immobilizer to remain in place for 2 weeks at all times   · Dressings may be changed as needed; reached out to Ortho to confirm type of dressing change  · Followed up with Dr Sepideh Carr on 5/16/23  · For DVT ppx x6 weeks since post-op; patient would like to transition to Oral  Xarelto or Eliquis would be best option  Can price check with pharmacy and adjust    · Continue IV Ancef for Cellulitis at RLE incision site per Ortho recommendations   · Monitor CBC/CMP - will repeat Monday   · Start probiotic; initially pt refused but eventually became agreeable after benefits discussed      · Cleared for gentle ROM of the knee, ok for AROM, hold PROM at this time   · D/c knee immobilizer  · Follow up in 4 weeks with repeat XR with Ortho  · Acute chomprehensive interdisciplinary inpatient rehabilitation to include intensive skilled therapies as outlines with oversight and management by a "rehabilitation Physician Assistant overseen by rehabilitation physician as well as inpatient rehabilitation nursing, case management and weekly interdisciplinary team meeting            Appreciate IM consultants medical co-management  Labs, medications, and imaging personally reviewed  ROS:  A ten point review of systems was completed and pertinent positives are listed in subjective section  All other systems reviewed were negative  Review of Systems   Constitutional: Positive for fatigue  Negative for chills and fever  Respiratory: Negative  Negative for shortness of breath  Cardiovascular: Negative  Negative for chest pain  Gastrointestinal: Negative  Negative for abdominal pain and constipation  Genitourinary: Negative  Negative for difficulty urinating  Musculoskeletal: Positive for myalgias  RLE discomfort   B/l feet neuropathy   Neurological: Negative  Psychiatric/Behavioral: Negative  OBJECTIVE:   /66 (BP Location: Right arm)   Pulse 66   Temp 98 3 °F (36 8 °C) (Temporal)   Resp 16   Ht 6' 4\" (1 93 m)   Wt 123 kg (271 lb 6 2 oz)   SpO2 97%   BMI 32 99 kg/m²     Physical Exam  Vitals reviewed  Constitutional:       General: He is not in acute distress  Appearance: He is not ill-appearing  HENT:      Head: Normocephalic and atraumatic  Eyes:      Pupils: Pupils are equal, round, and reactive to light  Cardiovascular:      Rate and Rhythm: Normal rate and regular rhythm  Pulses: Normal pulses  Heart sounds: Normal heart sounds  No murmur heard  Pulmonary:      Effort: Pulmonary effort is normal  No respiratory distress  Breath sounds: Normal breath sounds  Abdominal:      General: Bowel sounds are normal  There is no distension  Palpations: Abdomen is soft  Musculoskeletal:         General: Swelling present  Right lower leg: No edema  Left lower leg: No edema        Comments: Ana-incision RLE   Skin:     General: " Skin is warm and dry  Findings: Erythema present  Comments: Dressing change with nursing today  Ana-incision with erythema  Mild drainage to medial incision site  Swelling surrounding the incision site    Neurological:      General: No focal deficit present  Mental Status: He is alert and oriented to person, place, and time     Psychiatric:         Mood and Affect: Mood normal          Behavior: Behavior normal           Lab Results   Component Value Date    WBC 10 36 (H) 05/17/2023    HGB 8 4 (L) 05/17/2023    HCT 27 3 (L) 05/17/2023    MCV 95 05/17/2023     (H) 05/17/2023     Lab Results   Component Value Date    SODIUM 133 (L) 05/17/2023    K 3 5 05/17/2023     05/17/2023    CO2 23 05/17/2023    BUN 19 05/17/2023    CREATININE 0 77 05/17/2023    GLUC 232 (H) 05/17/2023    CALCIUM 8 4 05/17/2023     Lab Results   Component Value Date    INR 1 04 08/04/2020    INR 0 99 11/15/2018    PROTIME 13 8 08/04/2020    PROTIME 13 0 11/15/2018           Current Facility-Administered Medications:   •  acetaminophen (TYLENOL) tablet 650 mg, 650 mg, Oral, Q8H PRN, Iza Conrad PA-C, 650 mg at 05/18/23 2105  •  atorvastatin (LIPITOR) tablet 40 mg, 40 mg, Oral, QAM, Iza Conrad PA-C, 40 mg at 05/19/23 0270  •  ceFAZolin (ANCEF) IVPB (premix in dextrose) 2,000 mg 50 mL, 2,000 mg, Intravenous, Q8H, Iza Conrad PA-C, Last Rate: 100 mL/hr at 05/19/23 0555, 2,000 mg at 05/19/23 0555  •  escitalopram (LEXAPRO) tablet 5 mg, 5 mg, Oral, Daily, Iza Conrad PA-C, 5 mg at 05/19/23 6470  •  ferrous sulfate tablet 325 mg, 325 mg, Oral, Daily With Breakfast, NAILA Hawkins, 325 mg at 05/19/23 9522  •  insulin glargine (LANTUS) subcutaneous injection 25 Units 0 25 mL, 25 Units, Subcutaneous, HS, Iza Conrad PA-C, 25 Units at 05/18/23 2105  •  insulin lispro (HumaLOG) 100 units/mL subcutaneous injection 2-12 Units, 2-12 Units, Subcutaneous, 4x Daily (AC & HS), 2 Units at 05/19/23 0800 **AND** Fingerstick Glucose (POCT), , , 4x Daily AC and at bedtime, Bernard Reed PA-C  •  insulin lispro (HumaLOG) 100 units/mL subcutaneous injection 20 Units, 20 Units, Subcutaneous, TID With Meals, Bernard Reed PA-C, 20 Units at 05/19/23 2843  •  levothyroxine tablet 175 mcg, 175 mcg, Oral, Early Morning, Bernard Reed PA-C, 175 mcg at 05/19/23 0554  •  magnesium hydroxide (MILK OF MAGNESIA) oral suspension 30 mL, 30 mL, Oral, Daily PRN, Bernard Reed PA-C  •  melatonin tablet 3 mg, 3 mg, Oral, HS, Surekha Perry MD, 3 mg at 05/18/23 2105  •  methocarbamol (ROBAXIN) tablet 500 mg, 500 mg, Oral, Q6H PRN, Bernard Reed PA-C  •  ondansetron (ZOFRAN-ODT) dispersible tablet 4 mg, 4 mg, Oral, Q6H PRN, Bernard Reed PA-C  •  pantoprazole (PROTONIX) EC tablet 40 mg, 40 mg, Oral, Early Morning, Bernard Reed PA-C, 40 mg at 05/19/23 0343  •  rivaroxaban (XARELTO) tablet 10 mg, 10 mg, Oral, Daily With Breakfast, Bernard Reed PA-C, 10 mg at 05/19/23 4190  •  saccharomyces boulardii (FLORASTOR) capsule 250 mg, 250 mg, Oral, BID, Bernard Reed PA-C, 250 mg at 05/19/23 6731  •  senna-docusate sodium (SENOKOT S) 8 6-50 mg per tablet 1 tablet, 1 tablet, Oral, Daily, Bernard Reed PA-C, 1 tablet at 05/18/23 3062  •  tamsulosin (FLOMAX) capsule 0 4 mg, 0 4 mg, Oral, Daily With Pool Moore PA-C, 0 4 mg at 05/18/23 1606  •  verapamil (CALAN-SR) CR tablet 360 mg, 360 mg, Oral, HS, Bernard Reed PA-C, 360 mg at 05/18/23 2105  •  zolpidem (AMBIEN) tablet 10 mg, 10 mg, Oral, HS PRN, Bernard Reed PA-C, 10 mg at 05/18/23 2105    Past Medical History:   Diagnosis Date   • Broken internal right knee prosthesis ([facility] ) 01/2023   • Chronic kidney disease    • Colon polyp    • Constipation 03/09/2023   • Diabetes mellitus ([facility] )    • Disease of thyroid gland    • GERD (gastroesophageal reflux disease)    • HHS vs DKA 11/16/2018   • Hyperlipidemia    • Hypertension    • Thyroid cancer [facility])        Patient Active Problem List Diagnosis Date Noted   • MANNY (obstructive sleep apnea) 05/01/2023   • Closed fracture of medial plateau of right tibia 04/13/2023   • Depression and anxiety 03/03/2023   • Acquired genu varum of right lower extremity 02/21/2023   • MARKELL (acute kidney injury) ([facility] ) 02/14/2023   • Chronic kidney disease-mineral and bone disorder 02/14/2023   • Closed fracture of right tibial plateau 43/49/6434   • Stage 3b chronic kidney disease ([place] [address] ) 11/15/2022   • Hyperkalemia 11/15/2022   • Alcoholism ([place] [address] ) 11/15/2022   • Dupuytren's contracture of right hand 06/07/2022   • Type 2 diabetes mellitus with diabetic peripheral angiopathy without gangrene ([place] [address] ) 02/07/2022   • Physical deconditioning 12/21/2021   • Acquired hypothyroidism 10/18/2021   • Acquired absence of right foot ([place] [address] ) 03/29/2021   • Persistent proteinuria 01/12/2021   • Abnormal EKG 12/03/2020   • Preoperative examination 12/03/2020   • Insomnia 12/03/2020   • Acute blood loss anemia 10/08/2020   • Hypomagnesemia 09/09/2020   • Urinary retention 09/08/2020   • Thyroid cancer ([place] [address] ) 08/20/2020   • PAD (peripheral artery disease) ([place] [address] ) 08/05/2020   • Hyponatremia 08/04/2020   • Health maintenance examination 07/06/2020   • Elevated liver enzymes 10/24/2019   • Colon polyps 09/12/2019   • Right-sided tinnitus 11/26/2018   • Hypertension, essential 09/24/2018   • Gastroesophageal reflux disease without esophagitis 09/24/2018   • Obesity, Class I, BMI 30 0-34 9 (see actual BMI) 03/25/2010   • Hyperlipidemia 12/17/2009   • History of nonadherence to medical treatment 02/20/2008          Jose Fofana MD    I have spent a total time of 35 minutes on 05/19/23 in caring for this patient including Diagnostic results, Prognosis, Instructions for management, Patient and family education, Impressions, Counseling / Coordination of care, Documenting in the medical record, Reviewing / ordering tests, medicine, procedures  , Obtaining or reviewing history   and Communicating with other healthcare professionals   ** Please Note:  voice to text software may have been used in the creation of this document   Although proof errors in transcription or interpretation are a potential of such software**

## 2023-05-19 NOTE — NURSING NOTE
Patient out of bed for therapy only  Able to sit on side of bed independent and also roll independently in bed  Dressing changed to RLE  Small amount of bleeding noted from incision with pink skin surrounding  Dressing to be changed twice a day now  Dressings also changed to LLE and wound care in to see same  Reports no pain to this nurse  IV flushed well and antibiotics infused without difficulty  Will continue to monitor and follow plan of care

## 2023-05-19 NOTE — PROGRESS NOTES
05/19/23 1032   Pain Assessment   Pain Assessment Tool 0-10   Pain Score 5   Pain Location/Orientation Orientation: Right;Location: Knee   Restrictions/Precautions   Precautions Bed/chair alarms; Fall Risk;Limb alert  (Strict NWBing R LE, IV L hand)   RLE Weight Bearing Per Order NWB   RLE ROM Restriction   (AROM only R LE)   Oral Hygiene   Type of Assistance Needed Set-up / clean-up   Oral Hygiene CARE Score 5   Grooming   Able To Initiate Tasks;Brush/Clean Teeth   Limitation Noted In Safety;Strength   Findings s/u w/c level at the sink   Sit to Lying   Type of Assistance Needed Independent   Sit to Lying CARE Score 6   Lying to Sitting on Side of Bed   Type of Assistance Needed Independent   Lying to Sitting on Side of Bed CARE Score 6   Bed-Chair Transfer   Type of Assistance Needed Supervision   Comment Sit pivot from bed to/from wheelchair NWB RLE   Chair/Bed-to-Chair Transfer CARE Score 4   Toileting Hygiene   Type of Assistance Needed Set-up / 10 [facility] Score 5   Toileting   Able to [address] down yes, up yes  Manage Hygiene Bladder   Limitations Noted In Balance;Problem Solving;ROM;Safety;LE Strength  (srict NWB RLE)   Findings urinal use supine   Exercise Tools   Other Exercise Tool 1 yellow therabar 2 sets 15 up and down with BUE   Other Exercise Tool 2 fxl reacher use fr retireval of pegs from the floor using the reacher in the RUE   Other Exercise Tool 3 3# wt therex 2 sets 15 BUE including shoulder flexion/ extension, abd/ add and elbow flexion/ extension and supination/ pronation   Cognition   Overall Cognitive Status WFL   Orientation Level Oriented X4   Other Comments   Assessment Pt able to us L hand for part of therex however part of therex isolated to RUE only to avoid repeated use L hand where IV in place   Assessment   Treatment Assessment P tpresent supine agreeable to OT session including toileting using the urinal, transfers/ bed mobility and grooming  Discussed tub bench for use in tub and other ways of transitioning up/ down stairs outside at home using a shower seat to stand/ sit while helper moves seat down the stairs  Pt requires assist and supervision due to decreased balance, safety, endurance, strength/ ROM and NWB RLE  Pt will benefit from continued skilled OT services to increase indepednence with daily tasks  Problem List Decreased strength;Decreased range of motion;Decreased endurance; Impaired balance;Decreased safety awareness;Decreased skin integrity;Orthopedic restrictions;Pain  (strict NWB RUE)   Plan   Treatment/Interventions ADL retraining;Functional transfer training; Therapeutic exercise; Endurance training;Patient/family training;Equipment eval/education; Compensatory technique education   Progress Progressing toward goals   OT Therapy Minutes   OT Time In 1032   OT Time Out 1202   OT Total Time (minutes) 90   OT Mode of treatment - Individual (minutes) 90   Therapy Time missed   Time missed?  No

## 2023-05-20 LAB
GLUCOSE SERPL-MCNC: 112 MG/DL (ref 65–140)
GLUCOSE SERPL-MCNC: 133 MG/DL (ref 65–140)
GLUCOSE SERPL-MCNC: 187 MG/DL (ref 65–140)
GLUCOSE SERPL-MCNC: 239 MG/DL (ref 65–140)

## 2023-05-20 PROCEDURE — 97530 THERAPEUTIC ACTIVITIES: CPT

## 2023-05-20 PROCEDURE — 82948 REAGENT STRIP/BLOOD GLUCOSE: CPT

## 2023-05-20 PROCEDURE — 97542 WHEELCHAIR MNGMENT TRAINING: CPT

## 2023-05-20 PROCEDURE — 97110 THERAPEUTIC EXERCISES: CPT

## 2023-05-20 RX ADMIN — INSULIN LISPRO 20 UNITS: 100 INJECTION, SOLUTION INTRAVENOUS; SUBCUTANEOUS at 12:05

## 2023-05-20 RX ADMIN — FERROUS SULFATE TAB 325 MG (65 MG ELEMENTAL FE) 325 MG: 325 (65 FE) TAB at 08:21

## 2023-05-20 RX ADMIN — CEFAZOLIN SODIUM 2000 MG: 2 SOLUTION INTRAVENOUS at 05:57

## 2023-05-20 RX ADMIN — VERAPAMIL HYDROCHLORIDE 360 MG: 180 TABLET ORAL at 21:21

## 2023-05-20 RX ADMIN — Medication 3 MG: at 21:21

## 2023-05-20 RX ADMIN — INSULIN LISPRO 4 UNITS: 100 INJECTION, SOLUTION INTRAVENOUS; SUBCUTANEOUS at 12:05

## 2023-05-20 RX ADMIN — PANTOPRAZOLE SODIUM 40 MG: 40 TABLET, DELAYED RELEASE ORAL at 05:59

## 2023-05-20 RX ADMIN — INSULIN LISPRO 20 UNITS: 100 INJECTION, SOLUTION INTRAVENOUS; SUBCUTANEOUS at 17:10

## 2023-05-20 RX ADMIN — INSULIN LISPRO 2 UNITS: 100 INJECTION, SOLUTION INTRAVENOUS; SUBCUTANEOUS at 08:00

## 2023-05-20 RX ADMIN — CEFAZOLIN SODIUM 2000 MG: 2 SOLUTION INTRAVENOUS at 20:34

## 2023-05-20 RX ADMIN — ATORVASTATIN CALCIUM 40 MG: 40 TABLET, FILM COATED ORAL at 08:21

## 2023-05-20 RX ADMIN — LEVOTHYROXINE SODIUM 175 MCG: 75 TABLET ORAL at 05:59

## 2023-05-20 RX ADMIN — INSULIN LISPRO 20 UNITS: 100 INJECTION, SOLUTION INTRAVENOUS; SUBCUTANEOUS at 08:22

## 2023-05-20 RX ADMIN — ESCITALOPRAM OXALATE 5 MG: 5 TABLET, FILM COATED ORAL at 08:21

## 2023-05-20 RX ADMIN — CEFAZOLIN SODIUM 2000 MG: 2 SOLUTION INTRAVENOUS at 13:02

## 2023-05-20 RX ADMIN — ZOLPIDEM TARTRATE 10 MG: 5 TABLET ORAL at 21:21

## 2023-05-20 RX ADMIN — TAMSULOSIN HYDROCHLORIDE 0.4 MG: 0.4 CAPSULE ORAL at 17:10

## 2023-05-20 RX ADMIN — RIVAROXABAN 10 MG: 10 TABLET, FILM COATED ORAL at 08:21

## 2023-05-20 RX ADMIN — Medication 250 MG: at 08:21

## 2023-05-20 RX ADMIN — INSULIN GLARGINE 25 UNITS: 100 INJECTION, SOLUTION SUBCUTANEOUS at 21:21

## 2023-05-20 RX ADMIN — Medication 250 MG: at 17:10

## 2023-05-20 NOTE — PLAN OF CARE
Problem: PAIN - ADULT  Goal: Verbalizes/displays adequate comfort level or baseline comfort level  Description: Interventions:  - Encourage patient to monitor pain and request assistance  - Assess pain using appropriate pain scale  - Administer analgesics based on type and severity of pain and evaluate response  - Implement non-pharmacological measures as appropriate and evaluate response  - Consider cultural and social influences on pain and pain management  - Notify physician/advanced practitioner if interventions unsuccessful or patient reports new pain  Outcome: Progressing     Problem: INFECTION - ADULT  Goal: Absence or prevention of progression during hospitalization  Description: INTERVENTIONS:  - Assess and monitor for signs and symptoms of infection  - Monitor lab/diagnostic results  - Monitor all insertion sites, i e  indwelling lines, tubes, and drains  - Monitor endotracheal if appropriate and nasal secretions for changes in amount and color  - Coats appropriate cooling/warming therapies per order  - Administer medications as ordered  - Instruct and encourage patient and family to use good hand hygiene technique  - Identify and instruct in appropriate isolation precautions for identified infection/condition  Outcome: Progressing  Goal: Absence of fever/infection during neutropenic period  Description: INTERVENTIONS:  - Monitor WBC    Outcome: Progressing     Problem: DISCHARGE PLANNING  Goal: Discharge to home or other facility with appropriate resources  Description: INTERVENTIONS:  - Identify barriers to discharge w/patient and caregiver  - Arrange for needed discharge resources and transportation as appropriate  - Identify discharge learning needs (meds, wound care, etc )  - Arrange for interpretive services to assist at discharge as needed  - Refer to Case Management Department for coordinating discharge planning if the patient needs post-hospital services based on physician/advanced practitioner order or complex needs related to functional status, cognitive ability, or social support system  Outcome: Progressing

## 2023-05-20 NOTE — PROGRESS NOTES
Charted in 0845, treatment started at 0830  Entered info into wrong column time  05/20/23 0845   Pain Assessment   Pain Assessment Tool 0-10   Pain Score 5   Pain Location/Orientation Orientation: Right;Location: Leg   Restrictions/Precautions   Precautions Bed/chair alarms; Fall Risk   Weight Bearing Restrictions Yes   RLE Weight Bearing Per Order NWB   Cognition   Overall Cognitive Status WFL   Arousal/Participation Alert; Responsive   Attention Within functional limits   Orientation Level Oriented X4   Memory Within functional limits   Following Commands Follows all commands and directions without difficulty   Roll Left and Right   Type of Assistance Needed Independent   Roll Left and Right CARE Score 6   Lying to Sitting on Side of Bed   Type of Assistance Needed Independent   Lying to Sitting on Side of Bed CARE Score 6   Bed-Chair Transfer   Type of Assistance Needed Supervision   Chair/Bed-to-Chair Transfer CARE Score 4   Transfer Bed/Chair/Wheelchair   Limitations Noted In Balance; Endurance;UE Strength;LE Strength   Adaptive Equipment None  (sit pivot to WC from bed)   Sit Pivot Supervision   Supine to Sit Independent   Car Transfer   Reason if not Attempted Environmental limitations   Car Transfer CARE Score 10   Walk 10 Feet   Reason if not Attempted Safety concerns   Walk 10 Feet CARE Score 88   Walk 50 Feet with Two Turns   Reason if not Attempted Safety concerns   Walk 50 Feet with Two Turns CARE Score 88   Walk 150 Feet   Reason if not Attempted Safety concerns   Walk 150 Feet CARE Score 88   Walking 10 Feet on Uneven Surfaces   Reason if not Attempted Safety concerns   Walking 10 Feet on Uneven Surfaces CARE Score 88   Ambulation   Does the patient walk? 0  No, and walking goal is not clinically indicated     Wheel 50 Feet with Two Turns   Type of Assistance Needed Independent   Wheel 50 Feet with Two Turns CARE Score 6   Wheel 150 Feet   Type of Assistance Needed Independent   Wheel 150 Feet CARE Score 6   Wheelchair mobility   Does the patient use a wheelchair? 1  Yes   Type of Wheelchair Used 1  Manual   Method Right upper extremity; Left upper extremity   Assistance Provided For Remove Leg Rest;Replace Leg Rest   Distance Level Surface (feet) 250 ft   Distance Wheeled 3% Grade 20 ft   Findings Independent with multiple rest breaks due to fatigue  Curb or Single Stair   Reason if not Attempted Safety concerns   1 Step (Curb) CARE Score 88   4 Steps   Reason if not Attempted Safety concerns   4 Steps CARE Score 88   12 Steps   Reason if not Attempted Safety concerns   12 Steps CARE Score 88   Therapeutic Interventions   Strengthening seated TE with 2# to LLE   Other transfers, WC mobility   Assessment   Treatment Assessment Pt in bed to start session, eating breakfast  Once finished pt agreeable to therapy session  Pt able to complete sit pivot to [facility] with set up of WC and S, reminders for WBS  Pt able to propell WC over 250' during session with multiple rest breaks due to fatigue  Pt completed ther ex with 2# weight to LLE, no complication, minimical AAROM on RLE due to pain  Pt seated in WC with OT to end session, all needs in reach  Pt will benefit from continued skilled PT  PT Barriers   Physical Impairment Decreased strength;Decreased endurance; Impaired balance;Decreased mobility;Pain;Orthopedic restrictions   Functional Limitation Stair negotiation;Standing;Transfers; Walking; Wheelchair management   Plan   Treatment/Interventions Functional transfer training;LE strengthening/ROM; Elevations; Therapeutic exercise; Endurance training;Bed mobility   Progress Progressing toward goals   PT Therapy Minutes   PT Time In 0830   PT Time Out 0930   PT Total Time (minutes) 60   PT Mode of treatment - Individual (minutes) 45   PT Mode of treatment - Concurrent (minutes) 0   PT Mode of treatment - Group (minutes) 0   PT Mode of treatment - Co-treat (minutes) 0   PT Mode of Treatment - Total time(minutes) 45 minutes PT Cumulative Minutes 832   Therapy Time missed   Time missed?  No

## 2023-05-20 NOTE — NURSING NOTE
Patient out of bed for therapy only  Transfers per therapy at supervision level  Able to move self independently in bed  Voices no complaints of pain  Dressing change to right leg incision  Slight pink around incision with bleeding noted  Left leg abrasion areas left ROSS due to healing  Dressing to bottom of foot changed as ordered  Encouraged to move self in bed to reposition  IV flushes well and antibiotics infuse without difficulty  Will continue to monitor and follow plan of care

## 2023-05-21 LAB
GLUCOSE SERPL-MCNC: 126 MG/DL (ref 65–140)
GLUCOSE SERPL-MCNC: 134 MG/DL (ref 65–140)
GLUCOSE SERPL-MCNC: 171 MG/DL (ref 65–140)
GLUCOSE SERPL-MCNC: 78 MG/DL (ref 65–140)

## 2023-05-21 PROCEDURE — 82948 REAGENT STRIP/BLOOD GLUCOSE: CPT

## 2023-05-21 RX ADMIN — TAMSULOSIN HYDROCHLORIDE 0.4 MG: 0.4 CAPSULE ORAL at 17:32

## 2023-05-21 RX ADMIN — CEFAZOLIN SODIUM 2000 MG: 2 SOLUTION INTRAVENOUS at 20:42

## 2023-05-21 RX ADMIN — ATORVASTATIN CALCIUM 40 MG: 40 TABLET, FILM COATED ORAL at 08:12

## 2023-05-21 RX ADMIN — FERROUS SULFATE TAB 325 MG (65 MG ELEMENTAL FE) 325 MG: 325 (65 FE) TAB at 08:12

## 2023-05-21 RX ADMIN — INSULIN GLARGINE 25 UNITS: 100 INJECTION, SOLUTION SUBCUTANEOUS at 21:17

## 2023-05-21 RX ADMIN — ESCITALOPRAM OXALATE 5 MG: 5 TABLET, FILM COATED ORAL at 08:13

## 2023-05-21 RX ADMIN — Medication 250 MG: at 08:12

## 2023-05-21 RX ADMIN — SENNOSIDES AND DOCUSATE SODIUM 1 TABLET: 50; 8.6 TABLET ORAL at 08:13

## 2023-05-21 RX ADMIN — INSULIN LISPRO 20 UNITS: 100 INJECTION, SOLUTION INTRAVENOUS; SUBCUTANEOUS at 12:22

## 2023-05-21 RX ADMIN — CEFAZOLIN SODIUM 2000 MG: 2 SOLUTION INTRAVENOUS at 05:31

## 2023-05-21 RX ADMIN — ACETAMINOPHEN 650 MG: 325 TABLET ORAL at 21:19

## 2023-05-21 RX ADMIN — LEVOTHYROXINE SODIUM 175 MCG: 75 TABLET ORAL at 05:30

## 2023-05-21 RX ADMIN — RIVAROXABAN 10 MG: 10 TABLET, FILM COATED ORAL at 08:12

## 2023-05-21 RX ADMIN — PANTOPRAZOLE SODIUM 40 MG: 40 TABLET, DELAYED RELEASE ORAL at 05:31

## 2023-05-21 RX ADMIN — Medication 250 MG: at 17:32

## 2023-05-21 RX ADMIN — CEFAZOLIN SODIUM 2000 MG: 2 SOLUTION INTRAVENOUS at 13:28

## 2023-05-21 RX ADMIN — Medication 3 MG: at 21:17

## 2023-05-21 RX ADMIN — INSULIN LISPRO 20 UNITS: 100 INJECTION, SOLUTION INTRAVENOUS; SUBCUTANEOUS at 17:35

## 2023-05-21 RX ADMIN — INSULIN LISPRO 20 UNITS: 100 INJECTION, SOLUTION INTRAVENOUS; SUBCUTANEOUS at 08:13

## 2023-05-21 RX ADMIN — VERAPAMIL HYDROCHLORIDE 360 MG: 180 TABLET ORAL at 21:17

## 2023-05-21 RX ADMIN — INSULIN LISPRO 2 UNITS: 100 INJECTION, SOLUTION INTRAVENOUS; SUBCUTANEOUS at 08:13

## 2023-05-21 RX ADMIN — ZOLPIDEM TARTRATE 10 MG: 5 TABLET ORAL at 21:17

## 2023-05-21 NOTE — NURSING NOTE
05/21/23 Patient out of bed to go to bathroom supervision for transfers  Was out of bed for lunch  Tolerating IV antibiotics  Did report short period of nausea possibly related to iron pill  Right leg continues with edema around knee and incision area with slight redness noted  Noted moderate amount of bloody drainage on 2 abd's  Dressing changed to incision and left foot as ordered  Patient verbalized concern about incision not healing and possible complications  Will continue to encourage and monitor  Taking senna but no BM since 5/18  Attempted to move bowels x 5 today without results  Increasing fluids  Passing gas and denies abdominal pain at this time   Myke Jefferson

## 2023-05-21 NOTE — NURSING NOTE
Patient resting comfortably in bed at this time  No signs of distress noted  Patient with IV antibiotics infused through left hand IV site without difficulty  Dressing changed this morning for large amount of serousang drainage from the right leg incision  Patient was continent of bladder overnight with urinal usage  Call bell within reach  Will continue to monitor patient and follow plan of care

## 2023-05-21 NOTE — PLAN OF CARE
Problem: PAIN - ADULT  Goal: Verbalizes/displays adequate comfort level or baseline comfort level  Description: Interventions:  - Encourage patient to monitor pain and request assistance  - Assess pain using appropriate pain scale  - Administer analgesics based on type and severity of pain and evaluate response  - Implement non-pharmacological measures as appropriate and evaluate response  - Consider cultural and social influences on pain and pain management  - Notify physician/advanced practitioner if interventions unsuccessful or patient reports new pain  Outcome: Progressing     Problem: INFECTION - ADULT  Goal: Absence or prevention of progression during hospitalization  Description: INTERVENTIONS:  - Assess and monitor for signs and symptoms of infection  - Monitor lab/diagnostic results  - Administer medications as ordered  - Instruct and encourage patient and family to use good hand hygiene technique  - Identify and instruct in appropriate isolation precautions for identified infection/condition  Outcome: Progressing  Goal: Absence of fever/infection during neutropenic period  Description: INTERVENTIONS:  - Monitor WBC    Outcome: Progressing     Problem: SAFETY ADULT  Goal: Patient will remain free of falls  Description: INTERVENTIONS:  - Educate patient/family on patient safety including physical limitations  - Instruct patient to call for assistance with activity   - Consult OT/PT to assist with strengthening/mobility   - Keep Call bell within reach  - Keep bed low and locked with side rails adjusted as appropriate  - Keep care items and personal belongings within reach  - Initiate and maintain comfort rounds  - Make Fall Risk Sign visible to staff  - Apply yellow socks and bracelet for high fall risk patients  - Consider moving patient to room near nurses station  Outcome: Progressing  Goal: Maintain or return to baseline ADL function  Description: INTERVENTIONS:  -  Assess patient's ability to carry out ADLs; assess patient's baseline for ADL function and identify physical deficits which impact ability to perform ADLs (bathing, care of mouth/teeth, toileting, grooming, dressing, etc )  - Assess/evaluate cause of self-care deficits   - Assess range of motion  - Assess patient's mobility; develop plan if impaired  - Assess patient's need for assistive devices and provide as appropriate  - Encourage maximum independence but intervene and supervise when necessary  - Involve family in performance of ADLs  - Assess for home care needs following discharge   - Consider OT consult to assist with ADL evaluation and planning for discharge  - Provide patient education as appropriate  Outcome: Progressing  Goal: Maintains/Returns to pre admission functional level  Description: INTERVENTIONS:  - Set and communicate daily mobility goal to care team and patient/family/caregiver     - Collaborate with rehabilitation services on mobility goals if consulted  - Out of bed for toileting  - Record patient progress and toleration of activity level   Outcome: Progressing     Problem: DISCHARGE PLANNING  Goal: Discharge to home or other facility with appropriate resources  Description: INTERVENTIONS:  - Identify barriers to discharge w/patient and caregiver  - Arrange for needed discharge resources and transportation as appropriate  - Identify discharge learning needs (meds, wound care, etc )  - Arrange for interpretive services to assist at discharge as needed  - Refer to Case Management Department for coordinating discharge planning if the patient needs post-hospital services based on physician/advanced practitioner order or complex needs related to functional status, cognitive ability, or social support system  Outcome: Progressing     Problem: Potential for Falls  Goal: Patient will remain free of falls  Description: INTERVENTIONS:  - Educate patient/family on patient safety including physical limitations  - Instruct patient to call for assistance with activity   - Consult OT/PT to assist with strengthening/mobility   - Keep Call bell within reach  - Keep bed low and locked with side rails adjusted as appropriate  - Keep care items and personal belongings within reach  - Initiate and maintain comfort rounds  - Make Fall Risk Sign visible to staff  - Apply yellow socks and bracelet for high fall risk patients  - Consider moving patient to room near nurses station  Outcome: Progressing     Problem: Prexisting or High Potential for Compromised Skin Integrity  Goal: Skin integrity is maintained or improved  Description: INTERVENTIONS:  - Identify patients at risk for skin breakdown  - Assess and monitor skin integrity  - Assess and monitor nutrition and hydration status  - Monitor labs   - Assess for incontinence   - Turn and reposition patient  - Assist with mobility/ambulation  - Relieve pressure over bony prominences  - Avoid friction and shearing  - Provide appropriate hygiene as needed including keeping skin clean and dry  - Evaluate need for skin moisturizer/barrier cream  - Collaborate with interdisciplinary team   - Patient/family teaching  - Consider wound care consult   Outcome: Progressing     Problem: MOBILITY - ADULT  Goal: Maintain or return to baseline ADL function  Description: INTERVENTIONS:  -  Assess patient's ability to carry out ADLs; assess patient's baseline for ADL function and identify physical deficits which impact ability to perform ADLs (bathing, care of mouth/teeth, toileting, grooming, dressing, etc )  - Assess/evaluate cause of self-care deficits   - Assess range of motion  - Assess patient's mobility; develop plan if impaired  - Assess patient's need for assistive devices and provide as appropriate  - Encourage maximum independence but intervene and supervise when necessary  - Involve family in performance of ADLs  - Assess for home care needs following discharge   - Consider OT consult to assist with ADL evaluation and planning for discharge  - Provide patient education as appropriate  Outcome: Progressing  Goal: Maintains/Returns to pre admission functional level  Description: INTERVENTIONS:  - Set and communicate daily mobility goal to care team and patient/family/caregiver  - Collaborate with rehabilitation services on mobility goals if consulted  - Out of bed for toileting  - Record patient progress and toleration of activity level   Outcome: Progressing     Problem: Nutrition/Hydration-ADULT  Goal: Nutrient/Hydration intake appropriate for improving, restoring or maintaining nutritional needs  Description: Monitor and assess patient's nutrition/hydration status for malnutrition  Collaborate with interdisciplinary team and initiate plan and interventions as ordered  Monitor patient's weight and dietary intake as ordered or per policy  Utilize nutrition screening tool and intervene as necessary  Determine patient's food preferences and provide high-protein, high-caloric foods as appropriate       INTERVENTIONS:  - Monitor oral intake, urinary output, labs, and treatment plans  - Assess nutrition and hydration status and recommend course of action  - Evaluate amount of meals eaten  - Assist patient with eating if necessary   - Allow adequate time for meals  - Recommend/ encourage appropriate diets, oral nutritional supplements, and vitamin/mineral supplements  - Order, calculate, and assess calorie counts as needed  - Provide specific nutrition/hydration education as appropriate  - Include patient/family/caregiver in decisions related to nutrition  Outcome: Progressing

## 2023-05-22 LAB
ANION GAP SERPL CALCULATED.3IONS-SCNC: 8 MMOL/L (ref 4–13)
BASOPHILS # BLD AUTO: 0.05 THOUSANDS/ÂΜL (ref 0–0.1)
BASOPHILS NFR BLD AUTO: 1 % (ref 0–1)
BUN SERPL-MCNC: 16 MG/DL (ref 5–25)
CALCIUM SERPL-MCNC: 8.2 MG/DL (ref 8.4–10.2)
CHLORIDE SERPL-SCNC: 100 MMOL/L (ref 96–108)
CO2 SERPL-SCNC: 26 MMOL/L (ref 21–32)
CREAT SERPL-MCNC: 0.68 MG/DL (ref 0.6–1.3)
EOSINOPHIL # BLD AUTO: 0.14 THOUSAND/ÂΜL (ref 0–0.61)
EOSINOPHIL NFR BLD AUTO: 2 % (ref 0–6)
ERYTHROCYTE [DISTWIDTH] IN BLOOD BY AUTOMATED COUNT: 13.6 % (ref 11.6–15.1)
GFR SERPL CREATININE-BSD FRML MDRD: 103 ML/MIN/1.73SQ M
GLUCOSE P FAST SERPL-MCNC: 168 MG/DL (ref 65–99)
GLUCOSE SERPL-MCNC: 115 MG/DL (ref 65–140)
GLUCOSE SERPL-MCNC: 168 MG/DL (ref 65–140)
GLUCOSE SERPL-MCNC: 174 MG/DL (ref 65–140)
GLUCOSE SERPL-MCNC: 188 MG/DL (ref 65–140)
GLUCOSE SERPL-MCNC: 99 MG/DL (ref 65–140)
HCT VFR BLD AUTO: 26.5 % (ref 36.5–49.3)
HGB BLD-MCNC: 8.3 G/DL (ref 12–17)
IMM GRANULOCYTES # BLD AUTO: 0.02 THOUSAND/UL (ref 0–0.2)
IMM GRANULOCYTES NFR BLD AUTO: 0 % (ref 0–2)
LYMPHOCYTES # BLD AUTO: 1.84 THOUSANDS/ÂΜL (ref 0.6–4.47)
LYMPHOCYTES NFR BLD AUTO: 24 % (ref 14–44)
MCH RBC QN AUTO: 29.6 PG (ref 26.8–34.3)
MCHC RBC AUTO-ENTMCNC: 31.3 G/DL (ref 31.4–37.4)
MCV RBC AUTO: 95 FL (ref 82–98)
MONOCYTES # BLD AUTO: 0.94 THOUSAND/ÂΜL (ref 0.17–1.22)
MONOCYTES NFR BLD AUTO: 12 % (ref 4–12)
NEUTROPHILS # BLD AUTO: 4.72 THOUSANDS/ÂΜL (ref 1.85–7.62)
NEUTS SEG NFR BLD AUTO: 61 % (ref 43–75)
NRBC BLD AUTO-RTO: 0 /100 WBCS
PLATELET # BLD AUTO: 414 THOUSANDS/UL (ref 149–390)
PMV BLD AUTO: 10.5 FL (ref 8.9–12.7)
POTASSIUM SERPL-SCNC: 3.5 MMOL/L (ref 3.5–5.3)
RBC # BLD AUTO: 2.8 MILLION/UL (ref 3.88–5.62)
SODIUM SERPL-SCNC: 134 MMOL/L (ref 135–147)
WBC # BLD AUTO: 7.71 THOUSAND/UL (ref 4.31–10.16)

## 2023-05-22 PROCEDURE — 97110 THERAPEUTIC EXERCISES: CPT

## 2023-05-22 PROCEDURE — 97535 SELF CARE MNGMENT TRAINING: CPT

## 2023-05-22 PROCEDURE — 99232 SBSQ HOSP IP/OBS MODERATE 35: CPT | Performed by: PHYSICAL MEDICINE & REHABILITATION

## 2023-05-22 PROCEDURE — 97110 THERAPEUTIC EXERCISES: CPT | Performed by: PHYSICAL THERAPIST

## 2023-05-22 PROCEDURE — 82948 REAGENT STRIP/BLOOD GLUCOSE: CPT

## 2023-05-22 PROCEDURE — 97530 THERAPEUTIC ACTIVITIES: CPT

## 2023-05-22 PROCEDURE — 85025 COMPLETE CBC W/AUTO DIFF WBC: CPT | Performed by: PHYSICAL MEDICINE & REHABILITATION

## 2023-05-22 PROCEDURE — 80048 BASIC METABOLIC PNL TOTAL CA: CPT | Performed by: PHYSICAL MEDICINE & REHABILITATION

## 2023-05-22 PROCEDURE — 97530 THERAPEUTIC ACTIVITIES: CPT | Performed by: PHYSICAL THERAPIST

## 2023-05-22 PROCEDURE — 97542 WHEELCHAIR MNGMENT TRAINING: CPT | Performed by: PHYSICAL THERAPIST

## 2023-05-22 RX ORDER — FERROUS SULFATE 325(65) MG
325 TABLET ORAL
Status: DISCONTINUED | OUTPATIENT
Start: 2023-05-22 | End: 2023-05-30 | Stop reason: HOSPADM

## 2023-05-22 RX ADMIN — INSULIN LISPRO 20 UNITS: 100 INJECTION, SOLUTION INTRAVENOUS; SUBCUTANEOUS at 17:04

## 2023-05-22 RX ADMIN — ESCITALOPRAM OXALATE 5 MG: 5 TABLET, FILM COATED ORAL at 08:12

## 2023-05-22 RX ADMIN — PANTOPRAZOLE SODIUM 40 MG: 40 TABLET, DELAYED RELEASE ORAL at 05:28

## 2023-05-22 RX ADMIN — INSULIN LISPRO 2 UNITS: 100 INJECTION, SOLUTION INTRAVENOUS; SUBCUTANEOUS at 12:09

## 2023-05-22 RX ADMIN — ACETAMINOPHEN 650 MG: 325 TABLET ORAL at 14:21

## 2023-05-22 RX ADMIN — INSULIN LISPRO 20 UNITS: 100 INJECTION, SOLUTION INTRAVENOUS; SUBCUTANEOUS at 08:12

## 2023-05-22 RX ADMIN — Medication 250 MG: at 08:12

## 2023-05-22 RX ADMIN — Medication 3 MG: at 21:08

## 2023-05-22 RX ADMIN — SENNOSIDES AND DOCUSATE SODIUM 1 TABLET: 50; 8.6 TABLET ORAL at 08:12

## 2023-05-22 RX ADMIN — INSULIN LISPRO 2 UNITS: 100 INJECTION, SOLUTION INTRAVENOUS; SUBCUTANEOUS at 08:11

## 2023-05-22 RX ADMIN — TAMSULOSIN HYDROCHLORIDE 0.4 MG: 0.4 CAPSULE ORAL at 17:05

## 2023-05-22 RX ADMIN — ZOLPIDEM TARTRATE 10 MG: 5 TABLET ORAL at 21:08

## 2023-05-22 RX ADMIN — VERAPAMIL HYDROCHLORIDE 360 MG: 180 TABLET ORAL at 21:07

## 2023-05-22 RX ADMIN — CEFAZOLIN SODIUM 2000 MG: 2 SOLUTION INTRAVENOUS at 12:15

## 2023-05-22 RX ADMIN — INSULIN LISPRO 20 UNITS: 100 INJECTION, SOLUTION INTRAVENOUS; SUBCUTANEOUS at 12:09

## 2023-05-22 RX ADMIN — RIVAROXABAN 10 MG: 10 TABLET, FILM COATED ORAL at 07:13

## 2023-05-22 RX ADMIN — LEVOTHYROXINE SODIUM 175 MCG: 75 TABLET ORAL at 05:28

## 2023-05-22 RX ADMIN — CEFAZOLIN SODIUM 2000 MG: 2 SOLUTION INTRAVENOUS at 05:28

## 2023-05-22 RX ADMIN — FERROUS SULFATE TAB 325 MG (65 MG ELEMENTAL FE) 325 MG: 325 (65 FE) TAB at 07:13

## 2023-05-22 RX ADMIN — CEFAZOLIN SODIUM 2000 MG: 2 SOLUTION INTRAVENOUS at 20:01

## 2023-05-22 RX ADMIN — ATORVASTATIN CALCIUM 40 MG: 40 TABLET, FILM COATED ORAL at 08:12

## 2023-05-22 RX ADMIN — FERROUS SULFATE TAB 325 MG (65 MG ELEMENTAL FE) 325 MG: 325 (65 FE) TAB at 21:07

## 2023-05-22 RX ADMIN — INSULIN GLARGINE 25 UNITS: 100 INJECTION, SOLUTION SUBCUTANEOUS at 21:07

## 2023-05-22 RX ADMIN — Medication 250 MG: at 17:05

## 2023-05-22 NOTE — PROGRESS NOTES
05/22/23 1105 05/22/23 1305   Pain Assessment   Pain Assessment Tool  --  0-10   Pain Score  --  8   Pain Location/Orientation  --  Orientation: Right;Location: Knee; Location: Leg   Restrictions/Precautions   Precautions  --  Bed/chair alarms; Fall Risk;Pain;Limb alert  (NWB RLE)   RLE Weight Bearing Per Order  --  NWB   RLE ROM Restriction  --    (Cleared for gentle ROM of the knee - ok for AROM, no PROM at this time)   Sit to Lying   Type of Assistance Needed Independent Independent   Sit to Lying CARE Score 6 6   Lying to Sitting on Side of Bed   Type of Assistance Needed Independent Independent   Lying to Sitting on Side of Bed CARE Score 6 6   Bed-Chair Transfer   Type of Assistance Needed Supervision Supervision   Chair/Bed-to-Chair Transfer CARE Score 4 4   Exercise Tools   UE Ergometer  --  5 minutes forward and backwards with BUE and min resistance   Other Exercise Tool 1  --  yellow flex therabar 2 sets 15 up and down BUE   Other Exercise Tool 2  --  5# digiflex B hands   Other Exercise Tool 3  --  3# wt BUE 2 sets 15 including shoulder flexion/ extension, chest press, abd/ add; elbow flexion/ extension, supination/ pronationand wirst flexion/ extension   Cognition   Orientation Level  --  Oriented X4   Additional Activities   Additional Activities  --  Other (Comment)   Additional Activities Comments  --  w/c mobility in room supervision longer distances using BUE   Assessment   Treatment Assessment  --  Pt presents supine visiting with wife and agreeabel to OT session including BUE therex/ transfers/ mobility  Pt tolerates session without complaints and is making gains with barriers listed in previous note  Pt will benefit from conitnued skilled OT services to increase independence with daily tasks  Problem List  --  Decreased strength;Decreased range of motion;Decreased endurance; Impaired balance;Decreased safety awareness;Decreased skin integrity;Orthopedic restrictions;Pain  (NWB RLE)   Plan Treatment/Interventions  --  ADL retraining;Functional transfer training; Therapeutic exercise; Endurance training;Patient/family training;Equipment eval/education; Compensatory technique education   Progress  --  Progressing toward goals   OT Therapy Minutes   OT Time In 0580 2501   OT Time Out 1200 1400   OT Total Time (minutes) 55 55   OT Mode of treatment - Individual (minutes) 55 55   Therapy Time missed   Time missed?  --  No

## 2023-05-22 NOTE — NURSING NOTE
Photos of right leg wound were taken earlier during dressing change and sent to orthopedic surgeon by Violeta LOVE  Dressing right leg changed and steri strips applied to mid iincision down where wound is draining per Violeta LOVE  Pt tolerated procedure well  No change in assessment  Same plan of care followed

## 2023-05-22 NOTE — PROGRESS NOTES
05/22/23 0900   Pain Assessment   Pain Assessment Tool 0-10   Pain Score 4   Pain Location/Orientation Orientation: Right;Location: Knee   Restrictions/Precautions   Precautions Bed/chair alarms; Fall Risk  ([address] R LE)   RLE Weight Bearing Per Order NWB   ROM Restrictions Yes  (Cleared for gentle ROM of the knee - ok for AROM, no PROM at this time)   RLE ROM Restriction   (Cleared for gentle ROM of the knee - ok for AROM, no PROM at this time)   General   Change In Medical/Functional Status Antibiotic continues   Cognition   Overall Cognitive Status WFL   Subjective   Subjective Dressing continues to be saturated between dressing changes   Roll Left and Right   Type of Assistance Needed Independent  (Simultaneous filing  User may not have seen previous data )   Physical Assistance Level No physical assistance   Roll Left and Right CARE Score 6  (Simultaneous filing  User may not have seen previous data )   Sit to Lying   Type of Assistance Needed Independent   Physical Assistance Level No physical assistance   Sit to Lying CARE Score 6   Lying to Sitting on Side of Bed   Type of Assistance Needed Independent  (Simultaneous filing  User may not have seen previous data )   Physical Assistance Level No physical assistance   Lying to Sitting on Side of Bed CARE Score 6  (Simultaneous filing   User may not have seen previous data )   Bed-Chair Transfer   Type of Assistance Needed Supervision   Physical Assistance Level No physical assistance   Comment Sit pivot from bed to/from wheelchair, assist to maintain wheelchair position   Chair/Bed-to-Chair Transfer CARE Score 4   Car Transfer   Reason if not Attempted Environmental limitations   Car Transfer CARE Score 10   Walk 10 Feet   Reason if not Attempted Safety concerns   Walk 10 Feet CARE Score 88   Walk 50 Feet with Two Turns   Reason if not Attempted Safety concerns   Walk 50 Feet with Two Turns CARE Score 88   Walk 150 Feet   Reason if not Attempted Safety concerns   Walk 150 Feet CARE Score 88   Walking 10 Feet on Uneven Surfaces   Reason if not Attempted Safety concerns   Walking 10 Feet on Uneven Surfaces CARE Score 88   Ambulation   Does the patient walk? 0  No, and walking goal is not clinically indicated  Wheel 50 Feet with Two Turns   Type of Assistance Needed Independent   Physical Assistance Level No physical assistance   Wheel 50 Feet with Two Turns CARE Score 6   Wheel 150 Feet   Type of Assistance Needed Independent   Physical Assistance Level No physical assistance   Wheel 150 Feet CARE Score 6   Wheelchair mobility   Does the patient use a wheelchair? 1  Yes   Type of Wheelchair Used 1  Manual   Method Right upper extremity; Left upper extremity   Curb or Single Stair   Reason if not Attempted Safety concerns   1 Step (Curb) CARE Score 88   4 Steps   Reason if not Attempted Safety concerns   4 Steps CARE Score 88   12 Steps   Reason if not Attempted Safety concerns   12 Steps CARE Score 88   Picking Up Object   Reason if not Attempted Safety concerns   Picking Up Object CARE Score 88   Toilet Transfer   Type of Assistance Needed Physical assistance   Physical Assistance Level 26%-50%   Comment Grab bars, assist to position wheelchair in bathroom   Toilet Transfer CARE Score 3   Therapeutic Interventions   Strengthening seated TE, supine TE   Assessment   Treatment Assessment Patient presents supine in bed, NAD  Pt reports ongoing weeping and frequent bandage changes required  Requires cues for NWBing adherence with transfers, however, improving with transfer to affected side compared to only transfers to unaffected side  Requries ongoing rest breaks during endurance tasks  Prolonged rest breaks continued to be required  In need of ongoing interventions  Cont per POC  Problem List Decreased strength;Decreased range of motion;Decreased endurance; Impaired balance;Decreased safety awareness;Decreased skin integrity;Orthopedic restrictions;Pain   PT Barriers   Physical Impairment Decreased strength;Decreased endurance; Impaired balance;Decreased mobility;Pain;Orthopedic restrictions   Functional Limitation Stair negotiation;Standing;Transfers; Walking; Wheelchair management   Plan   Treatment/Interventions Functional transfer training;LE strengthening/ROM; Elevations; Therapeutic exercise; Endurance training;Patient/family training;Equipment eval/education; Bed mobility;Gait training   Progress Slow progress, decreased activity tolerance   PT Therapy Minutes   PT Time In 0900   PT Time Out 1030   PT Total Time (minutes) 90   PT Mode of treatment - Individual (minutes) 90   PT Mode of treatment - Concurrent (minutes) 0   PT Mode of treatment - Group (minutes) 0   PT Mode of treatment - Co-treat (minutes) 0   PT Mode of Treatment - Total time(minutes) 90 minutes   PT Cumulative Minutes 736     Patient returns to bed, all needs in reach  Alarm in place and activated  Encouraged use of call bell, patient verbalizes understanding

## 2023-05-22 NOTE — NURSING NOTE
Patient resting comfortably in bed at this time  No signs of distress noted  Patient with IV antibiotics infused through left hand IV site without difficulty  Dressing changed this morning for large amount of drainage from the right leg incision  Patient was continent of bladder overnight with urinal usage  Patient encouraged to reposition frequently to promote skin integrity  Call bell within reach  Will continue to monitor patient and follow plan of care

## 2023-05-22 NOTE — NURSING NOTE
After shower with OT left foot wound dsg changed , maxsorb AG and bordergauze applied  Right leg incisional wounds sutures intact , mod amt serosanguinous drainage from mid distal medial incision  Right lateral incision slightly reddened  Margoth LOVE vs and observed incisional areas before dressings applied  DSD, kerlix and ace bandage applied as ordered  Pt tolerated well  Pt's wife visiting at this time

## 2023-05-22 NOTE — PROGRESS NOTES
05/22/23 1105 05/22/23 1305   Pain Assessment   Pain Assessment Tool 0-10  --    Pain Score 5  --    Pain Location/Orientation Orientation: Right;Location: Knee; Location: Leg  --    Restrictions/Precautions   Precautions Bed/chair alarms; Fall Risk;Limb alert  ([address] R LE)  --    RLE Weight Bearing Per Order NWB  --    RLE ROM Restriction   (Cleared for gentle ROM of the knee - ok for AROM, no PROM at this time)  --    Oral Hygiene   Type of Assistance Needed Set-up / clean-up  --    Oral Hygiene CARE Score 5  --    Grooming   Able To Initiate Tasks; Wash/Dry Hands;Comb/Brush Hair;Wash/Dry Face;Brush/Clean Teeth  --    Limitation Noted In Problem Solving; Safety  --    Findings setup w/c and EOB  --    Shower/Bathe Self   Type of Assistance Needed Physical assistance  --    Physical Assistance Level 25% or less  --    Shower/Bathe Self CARE Score 3  --    Bathing   Assessed Bath Style Shower  --    Anticipated D/C Bath Style Shower  --    Able to [facility] No  --    Able to [employer] Temperature No  --    Able to Wash/Rinse/Dry (body part) Left Arm;Right Arm;L Upper Leg;R Upper Leg;Chest;Abdomen;Perineal Area; Buttocks  --    Limitations Noted in Balance; Endurance;Problem Solving; Safety;ROM;Strength  (NWB RLE)  --    Positioning Seated;Standing  --    Adaptive Equipment Longhand Reacher;Tub Bench; Shower [employer]  --    Findings  PAC reports pt may shower and allow water to cleanse leg avoiding rubbing and replace dressing when complete; RN aviabale directly following shower for dressing replacement  --    Tub/Shower Transfer   Limitations Noted In Balance;Problem Solving;ROM;Safety;LE Strength  --    Adaptive Equipment Grab Bars;Transfer Bench  --    Assessed Shower  --    Findings CGA NWB RLE  --    Upper Body Dressing   Type of Assistance Needed Set-up / clean-up  --    Upper Body Dressing CARE Score 5  --    Lower Body Dressing   Type of Assistance Needed Physical assistance  --    Physical Assistance Level 26%-50%  --    Comment assist with RLE after LLE with sunday to thread RLE first next time  --    Lower Body Dressing CARE Score 3  --    Putting On/Taking Off Footwear   Type of Assistance Needed Physical assistance  --    Physical Assistance Level 26%-50%  --    Comment able to remove socks, assist to sis  --    Putting On/Taking Off Footwear CARE Score 3  --    Dressing/Undressing Clothing   Remove UB Clothes Pullover Shirt  --    Don UB Clothes Pullover Shirt  --    Remove LB Clothes Pants; Shorts;Socks  --    Debbora Counts LB Clothes Shorts;Socks  --    Limitations Noted In Balance; Endurance;Problem Solving; Safety;Strength;ROM  (NWB RLE)  --    Positioning Supported Sit;Standing  --    Sit to Lying   Type of Assistance Needed Independent Independent   Sit to Lying CARE Score 6 6   Lying to Sitting on Side of Bed   Type of Assistance Needed Independent Independent   Lying to Sitting on Side of Bed CARE Score 6 6   Sit to Stand   Type of Assistance Needed Incidental touching  --    Sit to Stand CARE Score 4  --    Bed-Chair Transfer   Type of Assistance Needed Supervision Supervision   Chair/Bed-to-Chair Transfer CARE Score 4 4   Toileting Hygiene   Type of Assistance Needed Set-up / clean-up  --    Comment urinal use, 125 cc  emptied  --    Toileting Hygiene CARE Score 5  --    Toileting   Able to [address] down yes, up yes  --    Manage Hygiene Bladder  --    Limitations Noted In Balance;Problem Solving;ROM;Safety;LE Strength  (NWB RLE)  --    Adaptive Equipment Grab Bar  --    Additional Activities   Additional Activities Other (Comment)  --    Additional Activities Comments w/c mobility in room supervision with BUE  --    Assessment   Treatment Assessment Pt presents supine agreeable to OT ADL including a shower and related transfers,  having direct guidance from [facility] regarding incision care   Pt requires assist due to decreased balance, safety, endurance and generalized strength/ ROM gertrudis of RLE with pain and drainage from incision  Pt is progressing towards goals demonstrating improving strength and activity tolerance required for a successful transition to home with wife  --    Problem List Decreased strength;Decreased range of motion;Decreased endurance; Impaired balance;Decreased safety awareness;Decreased skin integrity;Orthopedic restrictions;Pain  (NWB RLE)  --    Plan   Treatment/Interventions ADL retraining;Functional transfer training; Therapeutic exercise; Endurance training;Patient/family training;Equipment eval/education; Compensatory technique education  --    Progress Progressing toward goals  --    OT Therapy Minutes   OT Time In 1105  --    OT Time Out 1200  --    OT Total Time (minutes) 55  --    OT Mode of treatment - Individual (minutes) 55  --    Therapy Time missed   Time missed?  No  --

## 2023-05-22 NOTE — PROGRESS NOTES
PM&R PROGRESS NOTE:  Oscar Farmer 59 y o  male MRN: [id number]  Unit/Bed#: -01 Encounter: [id number]        Rehabilitation Diagnosis: Impairment of mobility, safety and Activities of Daily Living (ADLs) due to Orthopedic Disorders:  08 9  Other Orthopedic Closed fracture of medial plataeu of right tibia    HPI: Oscar Farmer is a 59 y o  male with a PMH significant for CKD, Diabetes mellitus, GERD, HLD and HTN who presented to the [facility] on 05/01 for surgical management of a previous tibial plateau fracture  Patient seen in April for the same and opted out of surgical intervention at that time  Now, presented with varus malalignment, instability of the joint and significant comminution and bone void  He was taken to the OR with Dr Rene Lopez on 05/01 and is s/p ORIF to the right tibial plateau  Ordered NWB status post-op for approximately 10-12 weeks with immobilizer in place for 2 weeks post-op  Post-op patient developed urinary retention  Urology consulted  Patient refused straight cath and was following urinary retention protocol to only get casey catheter insertion if needed  Nephrology consulted with CKD history, started on IV fluid resuscitation  Developed hyponatremia  Home HCTZ was held and IVF were stopped  Etiology determined to be SIADH secondary to HCTZ  Once HCTZ was stopped, sodium started to improve  Patient evaluated by PT/OT and deemed appropriate for post acute rehabilitation services  He was accepted to [facility] and arrived on 5/10/23  SUBJECTIVE: Patient seen and evaluated  Patient requested Iron be moved to bedtime stating it is causing him to have an upset stomach  Did note he should remain on Iron at this time  Hgb is 8 4 today and he does have drainage noted from the medial incision site  Patient did express concern to still lose his leg and became tearful  Did provide encouragement and emotional support   Will be evaluating leg after patient shower's today  Areas that were visualized, erythema does appear to be improving  Did note patient will likely be on Ancef for 10-14 days  Reviewed x-ray results with patient  Patient did note he does feel better with Lexapro being restarted  He is starting to feel ready to return home but does express concern for needing to get in/out of the home as he cannot safely do steps at this time due to his weight-bearing restriction of the RLE  He did request a shower chair or if anyone can evaluate what type of equipment would be best to get in/out of his shower  Will mention to the team      ASSESSMENT: Stable, progressing       PLAN:    Rehabilitation  • Functional deficits: impaired mobility, self care, NWB RLE, pain RLE    • Continue current rehabilitation plan of care to maximize function  • Functional update:   o PT:  Roll L-R: independent   Sit-lying: independent   Lying to sitting on side of bed: independent   Bed-chair transfer: supervision   Ambulation: safety concerns   Wheelchair mobility: independent   Stairs:  Safety concerns   o OT:  Oral hygiene: giraldo cu  Grooming: giraldo w/c lvl at sink   Toileting hygiene: supervision         • Estimated Discharge: 5/26/23 with Sudheer Schaefer PT/OT/N, for re-team this week   • Likely will require Ancef 10-14 days  Would ideally like to monitor off antibiotics for a couple of days  • Incision site still with sanguinous drainage        Pain  • Tylenol 650 mg Q8H PRN for mild pain  • Robaxin 500 mg Q6H PRN for muscle spasms     DVT prophylaxis  • Lovenox sq inj 40 mg daily     Bladder plan  • Continent     Bowel plan  • Constipated   • Last BM 5/18/23  • Continue Senna daily HS    Skin care  · Monitor skin daily  · Apply skin nourishing cream  · Reposition Q2H to offload pressure  · Elevate heels off bed  · Monitor incision site  · RLE : cleanse leg with soap/water, change dressing with soilage/dislodgement and after showers   Can be done BID Cover with abd pad, wrap with ACE  · Left anterior shin/left plantar foot: cleanse wound beds with NSS, pat dry  Apply maxorb ag to plantar foor wound and cover with bordered gauze  Place dermagran on the left shin wound, cover with bordered gauze  Change dressings daily and PRN     Stage 3b chronic kidney disease [facility])  Assessment & Plan  Lab Results   Component Value Date    EGFR 103 05/22/2023    EGFR 98 05/17/2023    EGFR 102 05/15/2023    CREATININE 0 68 05/22/2023    CREATININE 0 77 05/17/2023    CREATININE 0 70 05/15/2023   · Monitor periodically  · Followed in acute care with Nephrology  · S/P IV Fluids  · HCTZ d/c in setting of hyponatremia     Type 2 diabetes mellitus with diabetic peripheral angiopathy without gangrene [facility])  Assessment & Plan  Lab Results   Component Value Date    HGBA1C 7 9 (H) 04/24/2023       Recent Labs     05/21/23  1119 05/21/23  1609 05/21/23 2014 05/22/23  0733   POCGLU 134 78 126 188*       Blood Sugar Average: Last 72 hrs:  (P) 631 [id number]   · Monitor accu cheks and adjust regimen as needed   · Continue Lantus 25 units daily HS, 20 units humalog TID AC, SSI  · Home: Lantus 30 units daily HS, Humalog 20 units TID AC  · Carb controlled diet     Acquired hypothyroidism  Assessment & Plan  · Levothyroxine 150 mcg daily increased to 175 mcg in setting of elevated TSH  · 11/14- 35 400, 5/15- 26 562  · Will need repeat TSH at next PCP follow up      Insomnia  Assessment & Plan  · Continue Ambien 10 mg HS PRN and Melatonin     PAD (peripheral artery disease) ([facility] [address] )  Assessment & Plan  · Continue home Plavix 75 mg daily    Hyponatremia  Assessment & Plan  · Seen by Nephrology in acute care  · Etiology determined to be SIADH secondary to HCTZ  · HCTZ since stopped   · 133 5/17- 134 5/22- has been stable       Hyperlipidemia  Assessment & Plan  · Continue Lipitor 40 mg     Gastroesophageal reflux disease without esophagitis  Assessment & Plan  · Continue Protonix 40 mg daily  · Home: Prilosec 40 mg daily    Hypertension, essential  Assessment & Plan  · Monitor vitals  · Continue Verapamil 360 mg daily HS    * Closed fracture of medial plateau of right tibia  Assessment & Plan  · Complex history   · Arrived 5/1 for surgical intervention of ongoing right tibial plateau fracture  · Previously in SNF but found to have varus malalignment, instability of the joint, significant comminution and bone void  · S/p ORIF on 05/01 with Dr Eduard Hernandez  · NWB for 10-12 weeks  · Immobilizer to remain in place for 2 weeks at all times   · Dressings may be changed as needed; reached out to Ortho to confirm type of dressing change  · Followed up with Dr Amy Bravo on 5/16/23  · For DVT ppx x6 weeks since post-op  · Xarelto price checked via pharmacy at $50 for a 30 day supply  · Transitioned to this here  · Home aspirin/plavix held while on Xarelto  May resume once Xarelto is completed    · Continue IV Ancef for Cellulitis at RLE incision site per Ortho recommendations - likely can d/c after 10-14 days  Will monitor incision site for resolution  · Monitor CBC - within normal limits 5/22  · Continue probiotic BID  · Cleared for gentle ROM of the knee, ok for AROM, hold PROM at this time   · D/c knee immobilizer  · Follow up in 4 weeks with repeat XR with Ortho  · Repeat XR 5/21: improved alignment of proximal tibial fractures s/p ORIF  Intact hardware   · To obtain photo to place in chart  Will reach out to ortho with sanguinous drainage noted and reported ongoing throughout the weekend  Dressing changes now BID  Continue to monitor closely   · Acute chomprehensive interdisciplinary inpatient rehabilitation to include intensive skilled therapies as outlines with oversight and management by a rehabilitation Physician Assistant overseen by rehabilitation physician as well as inpatient rehabilitation nursing, case management and weekly interdisciplinary team meeting            Appreciate IM consultants medical co-management    Labs, "medications, and imaging personally reviewed  ROS:  A ten point review of systems was completed and pertinent positives are listed in subjective section  All other systems reviewed were negative  Review of Systems   Constitutional: Positive for chills  Negative for fatigue and fever  Still getting occasional chills that take time to resolve; currently without chills   HENT: Negative  Respiratory: Negative  Negative for shortness of breath  Cardiovascular: Negative  Negative for chest pain  Gastrointestinal: Negative  Negative for abdominal pain and constipation  Genitourinary: Negative  Negative for difficulty urinating  Musculoskeletal: Positive for myalgias  5/10 RLE pain   Neurological: Negative  Psychiatric/Behavioral: Negative  OBJECTIVE:   BP 98/56 (BP Location: Right arm)   Pulse 65   Temp 98 1 °F (36 7 °C) (Temporal)   Resp 16   Ht 6' 4\" (1 93 m)   Wt 123 kg (270 lb 8 1 oz)   SpO2 99%   BMI 32 93 kg/m²     Physical Exam  Vitals reviewed  Constitutional:       General: He is not in acute distress  Appearance: He is not ill-appearing  HENT:      Head: Normocephalic and atraumatic  Eyes:      Pupils: Pupils are equal, round, and reactive to light  Cardiovascular:      Rate and Rhythm: Normal rate and regular rhythm  Pulses: Normal pulses  Heart sounds: Normal heart sounds  No murmur heard  Pulmonary:      Effort: Pulmonary effort is normal  No respiratory distress  Breath sounds: Normal breath sounds  Abdominal:      General: Bowel sounds are normal  There is no distension  Palpations: Abdomen is soft  Musculoskeletal:      Right lower leg: Edema present  Left lower leg: No edema  Comments: +1 edema RLE   Skin:     General: Skin is warm and dry  Findings: Erythema present  Comments: Dressings intact  Will evaluate when dressings taken down after shower    Areas that were visible; erythema appears to be " improving, now pink discoloration levi-incision  With sanguinous drainage to medial incision site- noted on dressings  Neurological:      General: No focal deficit present  Mental Status: He is alert and oriented to person, place, and time     Psychiatric:         Mood and Affect: Mood normal          Behavior: Behavior normal           Lab Results   Component Value Date    WBC 7 71 05/22/2023    HGB 8 3 (L) 05/22/2023    HCT 26 5 (L) 05/22/2023    MCV 95 05/22/2023     (H) 05/22/2023     Lab Results   Component Value Date    SODIUM 134 (L) 05/22/2023    K 3 5 05/22/2023     05/22/2023    CO2 26 05/22/2023    BUN 16 05/22/2023    CREATININE 0 68 05/22/2023    GLUC 168 (H) 05/22/2023    CALCIUM 8 2 (L) 05/22/2023     Lab Results   Component Value Date    INR 1 04 08/04/2020    INR 0 99 11/15/2018    PROTIME 13 8 08/04/2020    PROTIME 13 0 11/15/2018           Current Facility-Administered Medications:   •  acetaminophen (TYLENOL) tablet 650 mg, 650 mg, Oral, Q8H PRN, Vineet Garcia PA-C, 650 mg at 05/21/23 2119  •  atorvastatin (LIPITOR) tablet 40 mg, 40 mg, Oral, QAM, Vineet Garcia PA-C, 40 mg at 05/22/23 3702  •  ceFAZolin (ANCEF) IVPB (premix in dextrose) 2,000 mg 50 mL, 2,000 mg, Intravenous, Q8H, KATE Taylor-C, Last Rate: 100 mL/hr at 05/22/23 0528, 2,000 mg at 05/22/23 2583  •  escitalopram (LEXAPRO) tablet 5 mg, 5 mg, Oral, Daily, Vineet Garcia PA-C, 5 mg at 05/22/23 0340  •  ferrous sulfate tablet 325 mg, 325 mg, Oral, HS, Vineet Garcia PA-C  •  insulin glargine (LANTUS) subcutaneous injection 25 Units 0 25 mL, 25 Units, Subcutaneous, HS, Vineet Garcia PA-C, 25 Units at 05/21/23 2117  •  insulin lispro (HumaLOG) 100 units/mL subcutaneous injection 2-12 Units, 2-12 Units, Subcutaneous, 4x Daily (AC & HS), 2 Units at 05/22/23 0811 **AND** Fingerstick Glucose (POCT), , , 4x Daily AC and at bedtime, Vineet Garcia PA-C  •  insulin lispro (HumaLOG) 100 units/mL subcutaneous injection 20 Units, 20 Units, Subcutaneous, TID With Meals, Marny Hence, PA-C, 20 Units at 05/22/23 1955  •  levothyroxine tablet 175 mcg, 175 mcg, Oral, Early Morning, Marny Hence, PA-C, 175 mcg at 05/22/23 7411  •  magnesium hydroxide (MILK OF MAGNESIA) oral suspension 30 mL, 30 mL, Oral, Daily PRN, Marny Hence, PA-C  •  melatonin tablet 3 mg, 3 mg, Oral, HS, Paulie Benoit MD, 3 mg at 05/21/23 2117  •  methocarbamol (ROBAXIN) tablet 500 mg, 500 mg, Oral, Q6H PRN, Marny Hence, PA-C  •  ondansetron (ZOFRAN-ODT) dispersible tablet 4 mg, 4 mg, Oral, Q6H PRN, Marny Hence, PA-C  •  pantoprazole (PROTONIX) EC tablet 40 mg, 40 mg, Oral, Early Morning, Marny Hence, PA-C, 40 mg at 05/22/23 6471  •  rivaroxaban (XARELTO) tablet 10 mg, 10 mg, Oral, Daily With Breakfast, Marny Hence, PA-C, 10 mg at 05/22/23 7776  •  saccharomyces boulardii (FLORASTOR) capsule 250 mg, 250 mg, Oral, BID, Marny Hence, PA-C, 250 mg at 05/22/23 8278  •  senna-docusate sodium (SENOKOT S) 8 6-50 mg per tablet 1 tablet, 1 tablet, Oral, Daily, Marny Hence, PA-C, 1 tablet at 05/22/23 8054  •  tamsulosin (FLOMAX) capsule 0 4 mg, 0 4 mg, Oral, Daily With Mookie Peyer, PA-C, 0 4 mg at 05/21/23 1732  •  verapamil (CALAN-SR) CR tablet 360 mg, 360 mg, Oral, HS, Marny Hence, PA-C, 360 mg at 05/21/23 2117  •  zolpidem (AMBIEN) tablet 10 mg, 10 mg, Oral, HS PRN, Marny Hence, PA-C, 10 mg at 05/21/23 2117    Past Medical History:   Diagnosis Date   • Bro[facility] 01/2023   • Chronic kidney disease    • Colon polyp    • Constipation 03/09/2023   • Diabetes mellitus ([address] )    • Disease of thyroid gland    • GERD (gastroesophageal reflux disease)    • HHS vs DKA 11/16/2018   • Hyperlipidemia    • Hypertension    • Thyroid cancer [facility])        Patient Active Problem List    Diagnosis Date Noted   • MANNY (obstructive sleep apnea) 05/01/2023   • Closed fracture of medial plateau of right tibia 04/13/2023   • Depression and anxiety 03/03/2023   • Acquired genu varum of right lower extremity 02/21/2023   • MARKELL (acute kidney injury) ([facility] ) 02/14/2023   • Chronic kidney disease-mineral and bone disorder 02/14/2023   • Closed fracture of right tibial plateau 00/17/1790   • Stage 3b chronic kidney disease ([place] [id number] ) 11/15/2022   • Hyperkalemia 11/15/2022   • Alcoholism ([place] [id number] ) 11/15/2022   • Dupuytren's contracture of right hand 06/07/2022   • Type 2 diabetes mellitus with diabetic peripheral angiopathy without gangrene ([place] [id number] ) 02/07/2022   • Physical deconditioning 12/21/2021   • Acquired hypothyroidism 10/18/2021   • Acquired absence of right foot ([place] [id number] ) 03/29/2021   • Persistent proteinuria 01/12/2021   • Abnormal EKG 12/03/2020   • Preoperative examination 12/03/2020   • Insomnia 12/03/2020   • Acute blood loss anemia 10/08/2020   • Hypomagnesemia 09/09/2020   • Urinary retention 09/08/2020   • Thyroid cancer ([place] [id number] ) 08/20/2020   • PAD (peripheral artery disease) ([place] [id number] ) 08/05/2020   • Hyponatremia 08/04/2020   • Health maintenance examination 07/06/2020   • Elevated liver enzymes 10/24/2019   • Colon polyps 09/12/2019   • Right-sided tinnitus 11/26/2018   • Hypertension, essential 09/24/2018   • Gastroesophageal reflux disease without esophagitis 09/24/2018   • Obesity, Class I, BMI 30 0-34 9 (see actual BMI) 03/25/2010   • Hyperlipidemia 12/17/2009   • History of nonadherence to medical treatment 02/20/2008          Ezekiel Rain PA-C    I have spent a total time of 35 minutes on 05/22/23 in caring for this patient including Diagnostic results, Prognosis, Risks and benefits of tx options, Instructions for management, Patient and family education, Importance of tx compliance, Risk factor reductions, Impressions, Counseling / Coordination of care, Documenting in the medical record, Reviewing / ordering tests, medicine, procedures  , Obtaining or reviewing history   and Communicating with other healthcare professionals           ** Please Note: voice to text software may have been used in the creation of this document   Although proof errors in transcription or interpretation are a potential of such software**

## 2023-05-23 LAB
GLUCOSE SERPL-MCNC: 124 MG/DL (ref 65–140)
GLUCOSE SERPL-MCNC: 144 MG/DL (ref 65–140)
GLUCOSE SERPL-MCNC: 177 MG/DL (ref 65–140)
GLUCOSE SERPL-MCNC: 75 MG/DL (ref 65–140)

## 2023-05-23 PROCEDURE — 97530 THERAPEUTIC ACTIVITIES: CPT | Performed by: PHYSICAL THERAPIST

## 2023-05-23 PROCEDURE — 99232 SBSQ HOSP IP/OBS MODERATE 35: CPT | Performed by: PHYSICAL MEDICINE & REHABILITATION

## 2023-05-23 PROCEDURE — 97542 WHEELCHAIR MNGMENT TRAINING: CPT | Performed by: PHYSICAL THERAPIST

## 2023-05-23 PROCEDURE — 82948 REAGENT STRIP/BLOOD GLUCOSE: CPT

## 2023-05-23 PROCEDURE — 97110 THERAPEUTIC EXERCISES: CPT

## 2023-05-23 PROCEDURE — 97535 SELF CARE MNGMENT TRAINING: CPT

## 2023-05-23 PROCEDURE — 97110 THERAPEUTIC EXERCISES: CPT | Performed by: PHYSICAL THERAPIST

## 2023-05-23 PROCEDURE — 97112 NEUROMUSCULAR REEDUCATION: CPT | Performed by: PHYSICAL THERAPIST

## 2023-05-23 RX ORDER — POLYETHYLENE GLYCOL 3350 17 G/17G
17 POWDER, FOR SOLUTION ORAL DAILY PRN
Status: DISCONTINUED | OUTPATIENT
Start: 2023-05-23 | End: 2023-05-26

## 2023-05-23 RX ADMIN — VERAPAMIL HYDROCHLORIDE 360 MG: 180 TABLET ORAL at 21:23

## 2023-05-23 RX ADMIN — LEVOTHYROXINE SODIUM 175 MCG: 75 TABLET ORAL at 05:45

## 2023-05-23 RX ADMIN — FERROUS SULFATE TAB 325 MG (65 MG ELEMENTAL FE) 325 MG: 325 (65 FE) TAB at 21:24

## 2023-05-23 RX ADMIN — TAMSULOSIN HYDROCHLORIDE 0.4 MG: 0.4 CAPSULE ORAL at 16:11

## 2023-05-23 RX ADMIN — ACETAMINOPHEN 650 MG: 325 TABLET ORAL at 21:24

## 2023-05-23 RX ADMIN — INSULIN LISPRO 20 UNITS: 100 INJECTION, SOLUTION INTRAVENOUS; SUBCUTANEOUS at 08:07

## 2023-05-23 RX ADMIN — SENNOSIDES AND DOCUSATE SODIUM 1 TABLET: 50; 8.6 TABLET ORAL at 08:08

## 2023-05-23 RX ADMIN — PANTOPRAZOLE SODIUM 40 MG: 40 TABLET, DELAYED RELEASE ORAL at 05:45

## 2023-05-23 RX ADMIN — ZOLPIDEM TARTRATE 10 MG: 5 TABLET ORAL at 21:24

## 2023-05-23 RX ADMIN — INSULIN GLARGINE 25 UNITS: 100 INJECTION, SOLUTION SUBCUTANEOUS at 21:23

## 2023-05-23 RX ADMIN — CEFAZOLIN SODIUM 2000 MG: 2 SOLUTION INTRAVENOUS at 05:45

## 2023-05-23 RX ADMIN — CEFAZOLIN SODIUM 2000 MG: 2 SOLUTION INTRAVENOUS at 21:23

## 2023-05-23 RX ADMIN — Medication 250 MG: at 08:08

## 2023-05-23 RX ADMIN — ESCITALOPRAM OXALATE 5 MG: 5 TABLET, FILM COATED ORAL at 08:08

## 2023-05-23 RX ADMIN — INSULIN LISPRO 2 UNITS: 100 INJECTION, SOLUTION INTRAVENOUS; SUBCUTANEOUS at 08:07

## 2023-05-23 RX ADMIN — Medication 3 MG: at 21:24

## 2023-05-23 RX ADMIN — Medication 250 MG: at 17:26

## 2023-05-23 RX ADMIN — ATORVASTATIN CALCIUM 40 MG: 40 TABLET, FILM COATED ORAL at 08:08

## 2023-05-23 RX ADMIN — CEFAZOLIN SODIUM 2000 MG: 2 SOLUTION INTRAVENOUS at 12:42

## 2023-05-23 RX ADMIN — RIVAROXABAN 10 MG: 10 TABLET, FILM COATED ORAL at 07:41

## 2023-05-23 RX ADMIN — INSULIN LISPRO 20 UNITS: 100 INJECTION, SOLUTION INTRAVENOUS; SUBCUTANEOUS at 12:08

## 2023-05-23 NOTE — TEAM CONFERENCE
Acute RehabilitationTeam Conference Note  Date: 5/23/2023   Time: 10:37 AM       Patient Name:  Meagan Preston       Medical Record Number: [id number]   YOB: 1965  Sex:  Male          Room/Bed:  [facility] 211/[facility] 211-01  Payor Info:  Payor: BLUE CROSS / Plan: BC  [employer] BC PLAN [id number] / Product Type: Blue Fee for Service /      Admitting Diagnosis: Leg fracture, right [[id number]]   Admit Date/Time:  5/10/2023  4:11 PM  Admission Comments: No comment available     Primary Diagnosis:  Closed fracture of medial plateau of right tibia  Principal Problem: Closed fracture of medial plateau of right tibia    Patient Active Problem List    Diagnosis Date Noted   • MANNY (obstructive sleep apnea) 05/01/2023   • Closed fracture of medial plateau of right tibia 04/13/2023   • Depression and anxiety 03/03/2023   • Acquired genu varum of right lower extremity 02/21/2023   • MARKELL (acute kidney injury) [facility] ) 02/14/2023   • Chronic kidney disease-mineral and bone disorder 02/14/2023   • Closed fracture of right tibial plateau 91/34/5700   • Stage 3b chronic kidney disease ([facility] ) 11/15/2022   • Hyperkalemia 11/15/2022   • Alcoholism ([facility] ) 11/15/2022   • Dupuytren's contracture of right hand 06/07/2022   • Type 2 diabetes mellitus with diabetic peripheral angiopathy without gangrene ([facility] ) 02/07/2022   • Physical deconditioning 12/21/2021   • Acquired hypothyroidism 10/18/2021   • Acquired absence of right foot ([facility] ) 03/29/2021   • Persistent proteinuria 01/12/2021   • Abnormal EKG 12/03/2020   • Preoperative examination 12/03/2020   • Insomnia 12/03/2020   • Acute blood loss anemia 10/08/2020   • Hypomagnesemia 09/09/2020   • Urinary retention 09/08/2020   • Thyroid cancer ([facility] ) 08/20/2020   • PAD (peripheral artery disease) ([facility] ) 08/05/2020   • Hyponatremia 08/04/2020   • Health maintenance examination 07/06/2020   • Elevated liver enzymes 10/24/2019   • Colon polyps 09/12/2019   • Right-sided tinnitus 11/26/2018   • Hypertension, essential 09/24/2018   • Gastroesophageal reflux disease without esophagitis 09/24/2018   • Obesity, Class I, BMI 30 0-34 9 (see actual BMI) 03/25/2010   • Hyperlipidemia 12/17/2009   • History of nonadherence to medical treatment 02/20/2008       Physical Therapy:    Weight Bearing Status: Non-weight bearing (R LE)  Transfers: Supervision  Bed Mobility: Independent  Amulation Distance (ft): 0 feet  Ambulation: Total Assistance  Wheelchair Mobility Distance: 250 ft  Wheelchair Mobility: Independent  Number of Stairs: 0  Assistive Device for Stairs:  (Unable to attempt at this time due to [address])  Discharge Recommendations: Home with:  [address] with[de-identified] Family Support, First Floor Setup, Home Physical Therapy (vs alternative assistance)    5/16/2023:  Patient seen by ARU PT, making slow but steady progress  Presents following closed of medial plateau of right tibia, with decreased ROM/strength, decreased balance and safety, decreased endurance, and pain  Patient MI bed mobility, S/CGA transfers with sit pivot transfer, N/A ambulation or negotiation of stairs  Wheelchair propulsion with S/min A for >150 feet but with multiple rest breaks  Patient would benefit from continued inpatient [facility] PT to increase function, safety, and increased independence in prep for safe d/c to home  5/23/2023:  Patient seen by AR[facility] PT, making slow but steady progress  Limited by endurance, frequent rest breaks  Ongoing NWBing status, drainage from knee  Presents following closed of medial plateau of right tibia, with decreased ROM/strength, decreased balance and safety, decreased endurance, and pain  Patient MI bed mobility, S to unaffected side, S/CGA/min A to affected side transfers with sit pivot transfer, N/A ambulation or negotiation of stairs  Wheelchair propulsion with MI/S for >150 feet but with multiple rest breaks  Able to perform pull to stand with mod A at parallel bars max time 70 seconds    Patient would benefit from continued inpatient [facility] PT to increase function, safety, and increased independence in prep for safe d/c to home  Occupational Therapy:  Eating: Independent  Grooming: Supervision  Bathing: Minimal Assistance  Bathing: Minimal Assistance  Upper Body Dressing: Supervision  Lower Body Dressing: Minimal Assistance  Toileting: Minimal Assistance, Supervision  Tub/Shower Transfer: Incidental Touching  Toilet Transfer: Incidental Touching  Cognition: Within Defined Limits  Orientation: Person, Place, Time, Situation  Discharge Recommendations: Home with:  [address] with[de-identified] Family Support, First Floor Setup, Home Occupational Therapy       5/16/23: Pt's current LOF listed above  Barriers to d/c include decreased strength throughout but especially RLE s/p tibia fx, NWB RLE with immobilizer AAT, decreased balance, pain in RLE, and decreased activity tolerance; all affect independence in self care and fxl transfers  Pt participating in ADL training, therapeutic activities/exercises, functional mobility/transfers, and activity tolerance in order to progress towards goals  Pt would benefit from continued skilled OT services in order to address listed barriers and prepare for safe d/c     5/22/23: Pt participates in ADLs, transfers/ mobility and BUE therex  Barriers to d/c include decreased strength throughout but especially RLE s/p tibia fx with improvements noted, NWB RLE with immobilizer AAT, decreased balance, pain in RLE, and decreased activity tolerance; all affect independence in self care and fxl transfers  Pt participating in ADL training, therapeutic activities/exercises, functional mobility/transfers, and activity tolerance in order to progress towards goals  Pt reports having one w/c and one transport chair at home   Pt would benefit from continued skilled OT services in order to address listed barriers and prepare for safe d/c       Speech Therapy:           No notes on file    Nursing Notes:  Appetite: Good  Diet Type: Diabetic                      Diet Patient/Family Education Complete: Yes    Type of Wound (LDA): Wound                    Type of Wound Patient/Family Education: Yes  Bladder: Medication     Bladder Patient/Family Education: Yes  Bowel: Medication     Bowel Patient/Family Education: Yes  Pain Location/Orientation: Orientation: Right, Orientation: Lower, Location: Leg  Pain Score: 0                       Hospital Pain Intervention(s): Medication (See MAR), Repositioned, Rest  Pain Patient/Family Education: Yes  Medication Management/Safety  Injectable: Insulin  Safe Administration: Yes  Reason for non-safe administration: not attempted  Medication Patient/Family Education Complete: Yes    5/16/2023 Pt admitted 5-10-23 After ORIF Right Tibia Plateau  A/O x 4 , Heart -reg, lungs clear with an occ  Dry cough  Abdomen soft  Continent of B & B , Using urinal at night time , otherwise uses toilet  Pt  Is NWB to RLE  Pt does have wound to left foot for which podiatry has been consulted and being followed by wound care who are to re-evaluate on Wed  RLE with incisions 2+ edema with some drainage has F/U appointment with Ortho this afternoon  At present time has knee immobilizer for his RLE      5/22/23 Patient remains alert and oriented  Lungs are clear on room air  Patient is continent of bladder with urinal usage  Last bowel movement of 5/18 pt now receiving senna-s to help with bowel movements  Left foot wound with daily dressing changes completed by nursing  Right knee incisions with sutures and steri strips in place draining large amounts of serousanguenous drainage with twice daily dressing changes completed by nursing  Edema noted to the right knee  Patient no longer using a knee immobilizer  Patient receiving IV ancef every 8 hours for cellulitis following ortho follow up appointment  Blood sugars are being checked four times a day and insulin administered as required    Pt "states he was self injecting insulin with and insulin pen prior to admission  Patient to maintain non-weight bearing status to the right lower extremity at all times  Case Management:     Discharge Planning  Living Arrangements: Lives w/ Spouse/significant other  Support Systems: Spouse/significant other  Assistance Needed: none  Type of Current Residence: Private residence  Current Home Care Services: No  Initial assessment & orientation to [facility] with Pt, and phone contact with Pt's wife, who expressed understanding & agreement  Pt resides in a SS home with his wife, who is retired & drives  There are 3 steps in, which is Pt's self-ststed primary goal of rehab \"to do 3 stairs so I can get in the bathroom\"  The stairs to enter the residence have planks on them to serve as a ramp  Pt expressed that he is pleased to be in this facility, as he has had negative experiences in other rehabs out of state  Provided him with a supportive outlet & provided encouragement  Mood was appropriate for situation  Discussed role of team members & reviewed [address] with Pt & Pt's spouse, who expressed understanding & agreement  See UR section for insurance details; Pt was initially denied & needed a peer to peer to be admitted into [facility]; Pt & spouse both expressing that \"2 weeks won't be enough\"; discussed taking a day by day approach  SW will continue to monitor & assist as needed with [address]  5/16 - Tx team recommendations reviewed with patient, who expressed understanding  Pt declined for this worker to call his significant other, stating that he prefers to do so himself  Target DC date is 5/26 with [facility] (PT, OT); a list of providers was provided to Pt & a referral will be made based on Pt preference   Pt became tearful in discussing the DC plan; he expressed feeling that he does not think that is enough time to reach his mobility goals & stated \"I don't want to rely on others to help me anymore\", and " reflected on the manner in which his self-identity has been negatively impacted by a loss of independence; provided validation & emotional support & encouragement  SW will continue to monitor & assist as needed with Tx & DC planning  Is the patient actively participating in therapies? yes  List any modifications to the treatment plan: na    Barriers Interventions   NWB right LE Cues, ADL, transfer, pre-gait training   Cellulitis, DM, HTN, right LE wound, wound Left LE Medical management and oversight, reqs insulin, IV antibx, patient education, local care   Decreased Balance, end ADL, transfer, pre-gait training   Decreased strength and ROM Therapeutic exercise, therapeutic activity   pain Medication management     Is the patient making expected progress toward goals? yes  List any update or changes to goals: na    Medical Goals: Patient will be able to manage medical conditions and comorbid conditions with medications and follow up upon completion of rehab program    Weekly Team Goals:   Rehab Team Goals  ADL Team Goal: Patient will require supervision with ADLs with least restrictive device upon completion of rehab program  Transfer Team Goal: Patient will be independent with transfers with least restrictive device upon completion of rehab program  Locomotion Team Goal: Patient will be independent with locomotion with least restrictive device upon completion of rehab program     S bathing, dressing  Mod I toileting   Mod I bed mobility, transfers, and wc mobility  Family training     Health and wellness: to be able to return to completing outdoor activities, driving, shopping     Discussion: Plan for return home with spouse with [facility] for PT, OT, and nursing    Team feels that patient would benefit from additional time for improved safety to increase independence, continue treatment with IV antibiotics for cellulitis, monitoring of drainage with BID dressing changes, and medical oversight for limb loss prevention and to prevent readmission  Patient and family will req ongoing education for care of wounds      Anticipated Discharge Date:  May 30, 2023

## 2023-05-23 NOTE — NURSING NOTE
Patient resting comfortably in bed at this time   No signs of distress noted   Patient with IV antibiotics infused through left wrist IV site without difficulty   Dressing changed this morning for large amount of drainage from the right leg incision   Patient was continent of bladder overnight with urinal usage   Patient encouraged to reposition frequently to promote skin integrity  Call bell within reach   Will continue to monitor patient and follow plan of care

## 2023-05-23 NOTE — PROGRESS NOTES
"   05/23/23 1131   Pain Assessment   Pain Assessment Tool 0-10   Pain Score   (\"manageable\")   Restrictions/Precautions   Precautions Bed/chair alarms; Fall Risk   RLE Weight Bearing Per Order NWB   RLE ROM Restriction   (AROM of knee permitted)   Eating   Type of Assistance Needed Independent   Physical Assistance Level No physical assistance   Eating CARE Score 6   Exercise Tools   Other Exercise Tool 1 UE strengthening in bilat UE using 3# dumbbell, 2x15: elbow flexion/extension, protraction/retraction, internal/external rotation, pronation/supination, shoulder flexion/extension   Cognition   Overall Cognitive Status WFL   Arousal/Participation Alert; Responsive; Cooperative   Attention Within functional limits   Orientation Level Oriented X4   Memory Within functional limits   Following Commands Follows all commands and directions without difficulty   Assessment   Treatment Assessment Pt seen for brief OT session this AM focusing on UE strengthening  Pt tolerated exercises well and took rest breaks as needed  Pt requested to remain in bed for lunch  Pt continues with barriers to d/c of decreased strength throughout but especially RLE s/p fx, decreased balance, pain in RLE with NWB, and decreased activity tolerance; all affect independence in self care and fxl transfers  Pt would benefit from continued skilled OT services in order to address listed barriers and prepare for safe d/c  Prognosis Good   Problem List Decreased strength;Decreased range of motion;Decreased endurance; Impaired balance;Decreased safety awareness;Decreased skin integrity;Orthopedic restrictions;Pain   Plan   Treatment/Interventions ADL retraining;Functional transfer training;LE strengthening/ROM; Therapeutic exercise; Endurance training;Patient/family training;Equipment eval/education; Compensatory technique education;OT   Progress Progressing toward goals   OT Therapy Minutes   OT Time In 1131   OT Time Out 1201   OT Total Time (minutes) 30 " OT Mode of treatment - Individual (minutes) 30

## 2023-05-23 NOTE — PROGRESS NOTES
05/23/23 0859   Pain Assessment   Pain Assessment Tool 0-10   Pain Score 4   Pain Location/Orientation Orientation: Right;Location: Knee; Location: Incision   Restrictions/Precautions   Precautions Bed/chair alarms; Fall Risk  (NWB R LE, no PROM)   Weight Bearing Restrictions Yes   RLE Weight Bearing Per Order NWB   ROM Restrictions Yes  (Cleared for gentle ROM of the knee - ok for AROM, no PROM at this time)   General   Change In Medical/Functional Status Ongoing weeping from incision, ongoing requires twice a day dressing changes   Cognition   Overall Cognitive Status WFL   Subjective   Subjective Dressing saturated with blood, nsg changed it this a m  Roll Left and Right   Type of Assistance Needed Independent   Physical Assistance Level No physical assistance   Comment No bed rails required   Roll Left and Right CARE Score 6   Sit to Lying   Type of Assistance Needed Independent   Physical Assistance Level No physical assistance   Comment No bed rails required   Sit to Lying CARE Score 6   Lying to Sitting on Side of Bed   Type of Assistance Needed Independent   Physical Assistance Level No physical assistance   Comment no bed rails required   Lying to Sitting on Side of Bed CARE Score 6   Sit to Stand   Type of Assistance Needed Physical assistance   Physical Assistance Level 51%-75%   Comment mod-max A pull to stand x 3 trials with rest between trials    59 seconds, 70 seconds, 23 seconds  (Intermittent knee blocking)   Sit to Stand CARE Score 2   Bed-Chair Transfer   Type of Assistance Needed Supervision   Physical Assistance Level No physical assistance   Comment Sit pivot transfer, assist to stabilize wheelchair   Chair/Bed-to-Chair Transfer CARE Score 4   Transfer Bed/Chair/Wheelchair   Findings Pt verbalizes inability to move wheelchair level into/out of bathroom, unable to progress to hopping at this time NWBing on R LE due to UB weakness and muscle fatigue   Car Transfer   Reason if not Attempted Environmental limitations   Car Transfer CARE Score 10   Walk 10 Feet   Reason if not Attempted Safety concerns   Walk 10 Feet CARE Score 88   Walk 50 Feet with Two Turns   Reason if not Attempted Safety concerns   Walk 50 Feet with Two Turns CARE Score 88   Walk 150 Feet   Reason if not Attempted Safety concerns   Walk 150 Feet CARE Score 88   Walking 10 Feet on Uneven Surfaces   Reason if not Attempted Safety concerns   Walking 10 Feet on Uneven Surfaces CARE Score 88   Ambulation   Findings Unable to attempt to progress to ambulation trials at this time due to UB weakness and muscle fatigue  Able to tolerate stand x 70 seconds at max at this time, requires mod A to perform full pull to  parallel bars for standing trials  Does the patient walk? 0  No, and walking goal is not clinically indicated  Wheel 50 Feet with Two Turns   Type of Assistance Needed Independent   Physical Assistance Level No physical assistance   Comment level surfaces   Wheel 50 Feet with Two Turns CARE Score 6   Wheel 150 Feet   Type of Assistance Needed Independent   Physical Assistance Level No physical assistance   Comment level surfaces, difficulty progressing to uneven surfaces due to UB weakness and fatigue  Requires frequent rest breaks during mobility   Wheel 150 Feet CARE Score 6   Wheelchair mobility   Does the patient use a wheelchair? 1  Yes   Type of Wheelchair Used 1  Manual   Method Right upper extremity; Left upper extremity; Left lower extremity   Curb or Single Stair   Reason if not Attempted Safety concerns   1 Step (Curb) CARE Score 88   4 Steps   Reason if not Attempted Safety concerns   4 Steps CARE Score 88   12 Steps   Reason if not Attempted Safety concerns   12 Steps CARE Score 88   Stairs   Findings Home ramp not ADA, patient will be completely dependent with negotiating in/out of home making discharge home at present level a safety concern    Will need to continue to address elevations to identify safest manner of entering/exiting home to attend follow up appointments   Picking Up Object   Reason if not Attempted Safety concerns   Picking Up Object CARE Score 88   Toilet Transfer   Type of Assistance Needed Physical assistance   Physical Assistance Level 26%-50%   Comment Grab bar, pull to stand, assist with clothing management   Toilet Transfer CARE Score 3   Therapeutic Interventions   Strengthening Seated and supine LE TE   Assessment   Treatment Assessment Patient presents supine in NAD  Noted with blood on sheet, nsg aware  Pt continues to be motivated to return home, does voice concerns over re-fracture or loss of limb related to difficulty with maintaining NWBing R LE  Patient requires assist to perform sit to stand  Given description of home set up, patient may struggle with toilet transfers in/out of bathroom  Additionally, patient struggles on uneven surfaces at wheelchair level  Support system at home limited, will be dependent with in/out of home at wheelchair level  In need of ongoing interventions for safe transition home  Cont per POC  Problem List Decreased strength;Decreased range of motion;Decreased endurance; Impaired balance;Decreased safety awareness;Decreased skin integrity;Orthopedic restrictions;Pain  (NWBing R LE, no PROM, gentle AROM only)   PT Barriers   Physical Impairment Decreased strength;Decreased endurance; Impaired balance;Decreased mobility;Pain;Orthopedic restrictions   Functional Limitation Stair negotiation;Standing;Transfers; Walking; Wheelchair management   Plan   Treatment/Interventions Functional transfer training;LE strengthening/ROM; Elevations; Therapeutic exercise; Endurance training;Patient/family training;Equipment eval/education; Bed mobility;Gait training   Progress Progressing toward goals   PT Therapy Minutes   PT Time In 0859   PT Time Out 1030   PT Total Time (minutes) 91   PT Mode of treatment - Individual (minutes) 91   PT Mode of treatment - Concurrent (minutes) 0   PT Mode of treatment - Group (minutes) 0   PT Mode of treatment - Co-treat (minutes) 0   PT Mode of Treatment - Total time(minutes) 91 minutes   PT Cumulative Minutes 827     Seated LE TE:  3x10, B/L LEs, 2# on left LE  AROM on right LE    Supine LE TE AROM:  Heel slide on R  SLR Flex left  SLR ABd left  IR/ER bilaterally  GS  QS on right  SAQ on left  NWBing R LE bridges    Patient remains on toilet, nursing aware and patient with call bell within reach  Encouraged use of call bell, patient verbalizes understanding

## 2023-05-23 NOTE — PROGRESS NOTES
PM&R PROGRESS NOTE:  Hilaria Luis 59 y o  male MRN: [id number]  Unit/Bed#: [facility] 211-01 Encounter: [id number]        Rehabilitation Diagnosis: Impairment of mobility, safety and Activities of Daily Living (ADLs) due to Orthopedic Disorders:  08 9  Other Orthopedic Closed fracture of medial plataeu of right tibia    HPI: Hilaria Luis is a 59 y o  male with a PMH significant for CKD, Diabetes mellitus, GERD, HLD and HTN who presented to the [facility] on 05/01 for surgical management of a previous tibial plateau fracture  Patient seen in April for the same and opted out of surgical intervention at that time  Now, presented with varus malalignment, instability of the joint and significant comminution and bone void  He was taken to the OR with Dr Wenda Gosselin on 05/01 and is s/p ORIF to the right tibial plateau  Ordered NWB status post-op for approximately 10-12 weeks with immobilizer in place for 2 weeks post-op  Post-op patient developed urinary retention  Urology consulted  Patient refused straight cath and was following urinary retention protocol to only get casey catheter insertion if needed  Nephrology consulted with CKD history, started on IV fluid resuscitation  Developed hyponatremia  Home HCTZ was held and IVF were stopped  Etiology determined to be SIADH secondary to HCTZ  Once HCTZ was stopped, sodium started to improve  Patient evaluated by PT/OT and deemed appropriate for post acute rehabilitation services  He was accepted to [facility] and arrived on 5/10/23  SUBJECTIVE: Patient seen and evaluated  Explained discussion at team meeting and new discharge date  Patient is agreeable  He would also like more time due to fear of limb loss  He states his pain is controlled to the RLE today but he did note a decent amount of drainage when his dressings were changed early this morning  Due for another dressing change this afternoon  Did note sanguinous drainage on dressings  ASSESSMENT: Stable, progressing      PLAN:    Rehabilitation  • Functional deficits: impaired mobility, self care, NWB RLE, pain RLE    • Continue current rehabilitation plan of care to maximize function  • Functional update:   o PT:  Updated below   o OT:  Updated below    Physical Therapy Occupational Therapy Speech Therapy   Weight Bearing Status: Non-weight bearing (R LE)  Transfers: Supervision  Bed Mobility: Independent  Amulation Distance (ft): 0 feet  Ambulation: Total Assistance  Wheelchair Mobility Distance: 250 ft  Wheelchair Mobility: Independent  Number of Stairs: 0  Assistive Device for Stairs:  (Unable to attempt at this time due to [address])  Discharge Recommendations: Home with:  [address] with[de-identified] Family Support, First Floor Setup, Home Physical Therapy (vs alternative assistance)   Eating: Independent  Grooming: Supervision  Bathing: Minimal Assistance  Bathing: Minimal Assistance  Upper Body Dressing: Supervision  Lower Body Dressing: Minimal Assistance  Toileting: Minimal Assistance, Supervision  Tub/Shower Transfer: Incidental Touching  Toilet Transfer: Incidental Touching  Cognition: Within Defined Limits  Orientation: Person, Place, Time, Situation                 This patient was discussed by the Interdisciplinary Team in weekly case conference today  The care of the patient was extensively discussed with all care providers and an appropriate rehabilitation plan was formulated unique for this patient  Barriers were identified preventing progression of therapy and appropriate interventions were discussed with each discipline  Please see the team note for input from all disciplines regarding barriers, intervention, and discharge planning  • Estimated Discharge: 5/30/23 with Sudheer 76 PT/OT/N  • Requesting further inpatient management  Patient has the medical necessity to qualify for further inpatient rehabilitation     • Patient currently requiring IV Antibiotics day 7 out of 14 for Cellulitis at RLE incision site  Also noted to have moderate sanguinous drainage requiring dressing changes to be now BID rather than PRN  Will need to closely monitor  Steri strips now added between Nylon for prevention of dehiscence  Slow/poor wound healing secondary to co-morbidities, will need to be closely monitored  • Would like to keep patient on IV antibiotics rather than transition to PO  Erythema is improving, however slowly  • Would benefit from continue IV antibiotic treatment for resolution of cellulitis, prevention of limb loss and/or potential for readmission  • I would like to closely monitor the area as patient would have difficulty getting to/from appointments due to needing to get in/out of the home with steps  Patient Anika Bolden perform steps at this time secondary to NWB status and would require someone to physical lift him into/out of the home  • Patient unable to administer IV antibiotics to self and has no assistance from family/friends to help administer IV antibiotics  Currently receiving IV Ancef Q8H, unlikely that [address] can come out this many times in a day to administer  Pain  • Tylenol 650 mg Q8H PRN for mild pain  • Robaxin 500 mg Q6H PRN for muscle spasms     DVT prophylaxis  • Lovenox sq inj 40 mg daily     Bladder plan  • Continent     Bowel plan  • Constipated   • Last BM 5/18/23  • Continue Senna daily HS, will add Miralax daily PRN to trial today    Skin care  · Monitor skin daily  · Apply skin nourishing cream  · Reposition Q2H to offload pressure  · Elevate heels off bed  · Monitor incision site  · RLE : cleanse leg with soap/water, change dressing with soilage/dislodgement and after showers  Can be done BID Cover with abd pad, wrap with ACE  · Left anterior shin/left plantar foot: cleanse wound beds with NSS, pat dry  Apply maxorb ag to plantar foor wound and cover with bordered gauze  Place dermagran on the left shin wound, cover with bordered gauze   Change dressings daily and PRN     Stage 3b chronic kidney disease [facility])  Assessment & Plan  Lab Results   Component Value Date    EGFR 103 05/22/2023    EGFR 98 05/17/2023    EGFR 102 05/15/2023    CREATININE 0 68 05/22/2023    CREATININE 0 77 05/17/2023    CREATININE 0 70 05/15/2023   · Monitor periodically  · Followed in acute care with Nephrology  · S/P IV Fluids  · HCTZ d/c in setting of hyponatremia     Type 2 diabetes mellitus with diabetic peripheral angiopathy without gangrene [facility])  Assessment & Plan  Lab Results   Component Value Date    HGBA1C 7 9 (H) 04/24/2023       Recent Labs     05/22/23  1159 05/22/23  1633 05/22/23 2012 05/23/23  0744   POCGLU 174* 115 99 177*       Blood Sugar Average: Last 72 hrs:  (P) 093 [id number]   · Monitor accu cheks and adjust regimen as needed   · Continue Lantus 25 units daily HS, 20 units humalog TID AC, SSI  · Home: Lantus 30 units daily HS, Humalog 20 units TID AC  · Carb controlled diet     Acquired hypothyroidism  Assessment & Plan  · Levothyroxine 150 mcg daily increased to 175 mcg in setting of elevated TSH  · 11/14- 35 400, 5/15- 26 562  · Will need repeat TSH at next PCP follow up      Insomnia  Assessment & Plan  · Continue Ambien 10 mg HS PRN and Melatonin     PAD (peripheral artery disease) (HCC)  Assessment & Plan  · Home Plavix 75 mg daily currently on hold with Xarelto added for DVT ppx  · May resume once Xarelto is completed      Hyponatremia  Assessment & Plan  · Seen by Nephrology in acute care  · Etiology determined to be SIADH secondary to HCTZ  · HCTZ since stopped   · 134 5/22    Hyperlipidemia  Assessment & Plan  · Continue Lipitor 40 mg     Gastroesophageal reflux disease without esophagitis  Assessment & Plan  · Continue Protonix 40 mg daily  · Home: Prilosec 40 mg daily    Hypertension, essential  Assessment & Plan  · Monitor vitals  · Continue Verapamil 360 mg daily HS    * Closed fracture of medial plateau of right tibia  Assessment & Plan  · Complex history   · Arrived 5/1 for surgical intervention of ongoing right tibial plateau fracture  · Previously in SNF but found to have varus malalignment, instability of the joint, significant comminution and bone void  · S/p ORIF on 05/01 with Dr Clay Davis  · NWB for 10-12 weeks  · Followed up with Dr Breann White on 5/16/23  · For DVT ppx x6 weeks since post-op- Lovenox discontinued per patient's request  Ella Pedersen initiated  · Continue Xarelto for remainder of 6 weeks  · Price checked at $50 for 30-day supply  · Home aspirin and Plavix held while on Xarelto, may resume once Xarelto is completed   · Noted to have Cellulitis levi-incision site at Ortho follow up appointment  · Continue IV Ancef day 7/14  Patient started the antibiotic on 5/16/23 around 2100  Erythema appears to be improving  · Would ideally prefer to keep patient on IV antibiotics for full 14 days rather then transition to oral  · Would benefit from continued monitoring of incision site for additional days to monitor for resolution of cellulitis, prevention of limb loss or potential readmission  · Patient has steps to get into the home that he cannot perform at this time due to NWB status and would require assistance to lift patient into/out of home  There would be a concern for readmission if area is not closely monitored by a provider  · Area has been draining a moderate amount of sanguinous drainage requiring a change in dressing change orders from as needed to BID  Steri strips now applied between Nylon sutures  · Continue Probiotic BID while on antibiotic  · Gentle ROM of the knee, ok for AROM, hold PROM at this time   · Follow up in 3 weeks with ortho for repeat XR  · Note placed in chart of incision from yesterday and Ortho consulted  Steri-strips added between nylon to help with wound closure further     · Sutures will not be removed until patient follows up with Ortho as outpatient  · Repeat XR 5/21: improved alignment of proximal tibial fractures "s/p ORIF  Intact hardware   · Acute chomprehensive interdisciplinary inpatient rehabilitation to include intensive skilled therapies as outlines with oversight and management by a rehabilitation Physician Assistant overseen by rehabilitation physician as well as inpatient rehabilitation nursing, case management and weekly interdisciplinary team meeting            Appreciate IM consultants medical co-management  Labs, medications, and imaging personally reviewed  ROS:  A ten point review of systems was completed and pertinent positives are listed in subjective section  All other systems reviewed were negative  Review of Systems   Constitutional: Negative  HENT: Negative  Respiratory: Negative  Negative for shortness of breath  Cardiovascular: Negative  Negative for chest pain  Gastrointestinal: Negative  Negative for abdominal pain and constipation  Genitourinary: Negative  Negative for difficulty urinating  Musculoskeletal: Negative  Neurological: Negative  Psychiatric/Behavioral: Negative  OBJECTIVE:   /64 (BP Location: Right arm)   Pulse 62   Temp 98 3 °F (36 8 °C) (Temporal)   Resp 16   Ht 6' 4\" (1 93 m)   Wt 123 kg (270 lb 8 1 oz)   SpO2 95%   BMI 32 93 kg/m²     Physical Exam  Vitals reviewed  Constitutional:       General: He is not in acute distress  Appearance: He is not ill-appearing  HENT:      Head: Normocephalic and atraumatic  Eyes:      Pupils: Pupils are equal, round, and reactive to light  Cardiovascular:      Rate and Rhythm: Normal rate and regular rhythm  Pulses: Normal pulses  Heart sounds: Normal heart sounds  No murmur heard  Pulmonary:      Effort: Pulmonary effort is normal  No respiratory distress  Breath sounds: Normal breath sounds  Abdominal:      General: Bowel sounds are normal  There is no distension  Palpations: Abdomen is soft  Musculoskeletal:      Right lower leg: Edema present        Left " lower leg: No edema  Skin:     General: Skin is warm and dry  Comments: Dressings intact, did pull down ace and noted mild amount of sanguinous drainage to abd  Due for dressing change this afternoon  Nursing notified   Neurological:      General: No focal deficit present  Mental Status: He is alert and oriented to person, place, and time     Psychiatric:         Mood and Affect: Mood normal          Behavior: Behavior normal           Lab Results   Component Value Date    WBC 7 71 05/22/2023    HGB 8 3 (L) 05/22/2023    HCT 26 5 (L) 05/22/2023    MCV 95 05/22/2023     (H) 05/22/2023     Lab Results   Component Value Date    SODIUM 134 (L) 05/22/2023    K 3 5 05/22/2023     05/22/2023    CO2 26 05/22/2023    BUN 16 05/22/2023    CREATININE 0 68 05/22/2023    GLUC 168 (H) 05/22/2023    CALCIUM 8 2 (L) 05/22/2023     Lab Results   Component Value Date    INR 1 04 08/04/2020    INR 0 99 11/15/2018    PROTIME 13 8 08/04/2020    PROTIME 13 0 11/15/2018           Current Facility-Administered Medications:   •  acetaminophen (TYLENOL) tablet 650 mg, 650 mg, Oral, Q8H PRN, Shreyas Nelson PA-C, 650 mg at 05/22/23 1421  •  atorvastatin (LIPITOR) tablet 40 mg, 40 mg, Oral, QAM, Shreyas Nelson PA-C, 40 mg at 05/23/23 0808  •  ceFAZolin (ANCEF) IVPB (premix in dextrose) 2,000 mg 50 mL, 2,000 mg, Intravenous, Q8H, Shreyas Nelson PA-C, Last Rate: 100 mL/hr at 05/23/23 0545, 2,000 mg at 05/23/23 0545  •  escitalopram (LEXAPRO) tablet 5 mg, 5 mg, Oral, Daily, Shreyas Nelson PA-C, 5 mg at 05/23/23 8183  •  ferrous sulfate tablet 325 mg, 325 mg, Oral, HS, Shreyas Nelson PA-C, 325 mg at 05/22/23 2107  •  insulin glargine (LANTUS) subcutaneous injection 25 Units 0 25 mL, 25 Units, Subcutaneous, HS, Shreyas Nelson PA-C, 25 Units at 05/22/23 2107  •  insulin lispro (HumaLOG) 100 units/mL subcutaneous injection 2-12 Units, 2-12 Units, Subcutaneous, 4x Daily (AC & HS), 2 Units at 05/23/23 0807 **AND** Fingerstick Glucose (POCT), , , 4x Daily AC and at bedtime, GLORIA MorinC  •  insulin lispro (HumaLOG) 100 units/mL subcutaneous injection 20 Units, 20 Units, Subcutaneous, TID With Meals, KATE Morin-C, 20 Units at 05/23/23 0807  •  levothyroxine tablet 175 mcg, 175 mcg, Oral, Early Morning, Juhi mR PA-C, 175 mcg at 05/23/23 0545  •  magnesium hydroxide (MILK OF MAGNESIA) oral suspension 30 mL, 30 mL, Oral, Daily PRN, Juhi Rm PA-C  •  melatonin tablet 3 mg, 3 mg, Oral, HS, Pam Taylor MD, 3 mg at 05/22/23 2108  •  methocarbamol (ROBAXIN) tablet 500 mg, 500 mg, Oral, Q6H PRN, Juhi Rm PA-C  •  ondansetron (ZOFRAN-ODT) dispersible tablet 4 mg, 4 mg, Oral, Q6H PRN, KATE Morin-C  •  pantoprazole (PROTONIX) EC tablet 40 mg, 40 mg, Oral, Early Morning, Juhi Rm PA-C, 40 mg at 05/23/23 0545  •  polyethylene glycol (MIRALAX) packet 17 g, 17 g, Oral, Daily PRN, Juhi Rm PA-C  •  rivaroxaban (XARELTO) tablet 10 mg, 10 mg, Oral, Daily With Breakfast, Juhi Rm PA-C, 10 mg at 05/23/23 4155  •  saccharomyces boulardii (FLORASTOR) capsule 250 mg, 250 mg, Oral, BID, Juhi Rm PA-C, 250 mg at 05/23/23 0427  •  senna-docusate sodium (SENOKOT S) 8 6-50 mg per tablet 1 tablet, 1 tablet, Oral, Daily, Juhi Rm PA-C, 1 tablet at 05/23/23 0577  •  tamsulosin (FLOMAX) capsule 0 4 mg, 0 4 mg, Oral, Daily With Dorena Kras, PA-C, 0 4 mg at 05/22/23 1705  •  verapamil (CALAN-SR) CR tablet 360 mg, 360 mg, Oral, HS, Juhidaniela Rm PA-C, 360 mg at 05/22/23 2107  •  zolpidem (AMBIEN) tablet 10 mg, 10 mg, Oral, HS PRN, Juhi Rm PA-C, 10 mg at 05/22/23 2108    Past Medical History:   Diagnosis Date   • Broken internal right knee prosthesis ([facility] ) 01/2023   • Chronic kidney disease    • Colon polyp    • Constipation 03/09/2023   • Diabetes mellitus ([facility] )    • Disease of thyroid gland    • GERD (gastroesophageal reflux disease)    • HHS vs DKA 11/16/2018   • Hyperlipidemia    • Hypertension    • Thyroid cancer [facility])        Patient Active Problem List    Diagnosis Date Noted   • MANNY (obstructive sleep apnea) 05/01/2023   • Closed fracture of medial plateau of right tibia 04/13/2023   • Depression and anxiety 03/03/2023   • Acquired genu varum of right lower extremity 02/21/2023   • MARKELL (acute kidney injury) ([facility] ) 02/14/2023   • Chronic kidney disease-mineral and bone disorder 02/14/2023   • Closed fracture of right tibial plateau 77/54/0619   • Stage 3b chronic kidney disease ([facility] ) 11/15/2022   • Hyperkalemia 11/15/2022   • Alcoholism ([place] [address] ) 11/15/2022   • Dupuytren's contracture of right hand 06/07/2022   • Type 2 diabetes mellitus with diabetic peripheral angiopathy without gangrene ([facility]  ) 02/07/2022   • Physical deconditioning 12/21/2021   • Acquired hypothyroidism 10/18/2021   • Acquired absence of right foot ([facility]  ) 03/29/2021   • Persistent proteinuria 01/12/2021   • Abnormal EKG 12/03/2020   • Preoperative examination 12/03/2020   • Insomnia 12/03/2020   • Acute blood loss anemia 10/08/2020   • Hypomagnesemia 09/09/2020   • Urinary retention 09/08/2020   • Thyroid cancer ([facility] ) 08/20/2020   • PAD (peripheral artery disease) ([place] [address] ) 08/05/2020   • Hyponatremia 08/04/2020   • Health maintenance examination 07/06/2020   • Elevated liver enzymes 10/24/2019   • Colon polyps 09/12/2019   • Right-sided tinnitus 11/26/2018   • Hypertension, essential 09/24/2018   • Gastroesophageal reflux disease without esophagitis 09/24/2018   • Obesity, Class I, BMI 30 0-34 9 (see actual BMI) 03/25/2010   • Hyperlipidemia 12/17/2009   • History of nonadherence to medical treatment 02/20/2008          GABRIEL Solorzano have spent a total time of 45 minutes on 05/23/23 in caring for this patient including Risks and benefits of tx options, Instructions for management, Patient and family education, Impressions, Counseling / Coordination of care, Documenting in the medical record, Reviewing / ordering tests, medicine, procedures  , Obtaining or reviewing history   and Communicating with other healthcare professionals   ** Please Note:  voice to text software may have been used in the creation of this document   Although proof errors in transcription or interpretation are a potential of such software**

## 2023-05-23 NOTE — NURSING NOTE
Tolerated dressing changes as ordered  Right leg dressing with serosanguinous drainage, see documentation  Continue same plan of care

## 2023-05-23 NOTE — PROGRESS NOTES
05/23/23 1230   Pain Assessment   Pain Assessment Tool 0-10   Pain Score 8   Pain Location/Orientation Orientation: Right;Location: Knee   Restrictions/Precautions   Precautions Bed/chair alarms; Fall Risk   RLE Weight Bearing Per Order NWB   RLE ROM Restriction   (AROM of knee permitted)   Oral Hygiene   Type of Assistance Needed Independent   Physical Assistance Level No physical assistance   Comment seated at sink   Oral Hygiene CARE Score 6   Grooming   Able To Initiate Tasks; Acquire Items; Shave;Wash/Dry Face;Brush/Clean Teeth   Limitation Noted In Safety   Shower/Bathe Self   Type of Assistance Needed Set-up / clean-up   Physical Assistance Level No physical assistance   Comment washed UB, perineal/buttocks   Shower/Bathe Self CARE Score 5   Bathing   Assessed Bath Style Sponge Bath   Anticipated D/C Bath Style Shower;Sponge Bath   Able to Reading Bam No   Able to Wash/Rinse/Dry (body part) Left Arm;Right Arm;L Upper Leg;R Upper Leg;Chest;Abdomen;Perineal Area; Buttocks   Limitations Noted in Balance; Endurance;ROM;Safety;Strength   Positioning Seated   Upper Body Dressing   Type of Assistance Needed Set-up / clean-up   Physical Assistance Level No physical assistance   Upper Body Dressing CARE Score 5   Sit to Lying   Type of Assistance Needed Independent   Physical Assistance Level No physical assistance   Sit to Lying CARE Score 6   Lying to Sitting on Side of Bed   Type of Assistance Needed Independent   Physical Assistance Level No physical assistance   Lying to Sitting on Side of Bed CARE Score 6   Bed-Chair Transfer   Type of Assistance Needed Supervision   Physical Assistance Level No physical assistance   Comment sit pivot   Chair/Bed-to-Chair Transfer CARE Score 4   Transfer Bed/Chair/Wheelchair   Limitations Noted In Balance; Endurance;LE Strength   Toileting Hygiene   Type of Assistance Needed Physical assistance   Physical Assistance Level 51%-75%   Comment assist for clothing management Toileting Hygiene CARE Score 2   Toileting   Able to Pull Clothing down no, up no  Manage Hygiene Bladder   Limitations Noted In Balance;ROM;Safety;LE Strength   Adaptive Equipment Grab Bar   Toilet Transfer   Type of Assistance Needed Incidental touching   Comment CG   Toilet Transfer CARE Score 4   Toilet Transfer   Surface Assessed Standard Toilet   Transfer Technique Sit Pivot   Limitations Noted In Balance; Endurance; Safety;LE Strength   Adaptive Equipment Grab Bar   Exercise Tools   Other Exercise Tool 1 UE strengthening in bilat UE using 3# therabar, x30: elbow flexion/extension, protraction/retraction, shoulder flexion/extension   Other Exercise Tool 2 3x30 digit flexion/extension in bilat UE using 7# digi flex   Cognition   Overall Cognitive Status WFL   Arousal/Participation Alert; Responsive; Cooperative   Attention Within functional limits   Orientation Level Oriented X4   Memory Within functional limits   Following Commands Follows all commands and directions without difficulty   Assessment   Treatment Assessment Pt agreeable to OT session this PM  Received lying supine in bed  ADL session completed; current LOF and details listed in respective sections  Pt is overall supervision/set up for completed ADL tasks with the exception of modA toileting and La/CG transfer; pt maintained NWB RLE and reported pain in R knee that did impact tolerance  Pt completed UE strengthening exercises after ADL with rest breaks taken as needed; returned to supine for exercises  Pt continues with barriers to d/c of decreased strength throughout but especially RLE s/p fx, decreased balance, pain in RLE with NWB, and decreased activity tolerance; all affect independence in self care and fxl transfers  Pt would benefit from continued skilled OT services in order to address listed barriers and prepare for safe d/c  Prognosis Good   Problem List Decreased strength;Decreased range of motion;Decreased endurance; Impaired balance;Decreased safety awareness;Decreased skin integrity;Orthopedic restrictions;Pain   Plan   Treatment/Interventions ADL retraining;Functional transfer training;LE strengthening/ROM; Therapeutic exercise; Endurance training;Patient/family training;Equipment eval/education; Compensatory technique education;OT   Progress Progressing toward goals   OT Therapy Minutes   OT Time In 1230   OT Time Out 1330   OT Total Time (minutes) 60   OT Mode of treatment - Individual (minutes) 30   OT Mode of treatment - Concurrent (minutes) 30

## 2023-05-23 NOTE — CASE MANAGEMENT
Tx team recommendations reviewed with patient & spouse, who expressed understanding & agreement  Target DC date is Tuesday, 5/30 with [facility] (Nsg, PT, OT); a list of providers was provided to Pt & a referral will be made based on Pt preference  Pt will transition to oral antibiotics on 5/30  Wound care has included dressing changes BID, which needs to be taught to family  FT arranged for Friday at 12:30 to review therapy recommendations with PT & OT, & wound care/dressing changes with nursing  SW will continue to monitor & assist as needed with Tx & DC planning       Update to be faxed to insurance company today noting the medical necessity of extension through 5/30/23 per [facility] medical team

## 2023-05-24 LAB
GLUCOSE SERPL-MCNC: 153 MG/DL (ref 65–140)
GLUCOSE SERPL-MCNC: 155 MG/DL (ref 65–140)
GLUCOSE SERPL-MCNC: 179 MG/DL (ref 65–140)
GLUCOSE SERPL-MCNC: 60 MG/DL (ref 65–140)

## 2023-05-24 PROCEDURE — 97110 THERAPEUTIC EXERCISES: CPT | Performed by: PHYSICAL THERAPIST

## 2023-05-24 PROCEDURE — 97530 THERAPEUTIC ACTIVITIES: CPT

## 2023-05-24 PROCEDURE — 97542 WHEELCHAIR MNGMENT TRAINING: CPT | Performed by: PHYSICAL THERAPIST

## 2023-05-24 PROCEDURE — 97535 SELF CARE MNGMENT TRAINING: CPT

## 2023-05-24 PROCEDURE — 82948 REAGENT STRIP/BLOOD GLUCOSE: CPT

## 2023-05-24 PROCEDURE — 97530 THERAPEUTIC ACTIVITIES: CPT | Performed by: PHYSICAL THERAPIST

## 2023-05-24 PROCEDURE — 99232 SBSQ HOSP IP/OBS MODERATE 35: CPT | Performed by: PHYSICAL MEDICINE & REHABILITATION

## 2023-05-24 PROCEDURE — 97110 THERAPEUTIC EXERCISES: CPT

## 2023-05-24 RX ADMIN — Medication 250 MG: at 08:03

## 2023-05-24 RX ADMIN — RIVAROXABAN 10 MG: 10 TABLET, FILM COATED ORAL at 08:03

## 2023-05-24 RX ADMIN — CEFAZOLIN SODIUM 2000 MG: 2 SOLUTION INTRAVENOUS at 05:45

## 2023-05-24 RX ADMIN — ACETAMINOPHEN 650 MG: 325 TABLET ORAL at 21:12

## 2023-05-24 RX ADMIN — ZOLPIDEM TARTRATE 10 MG: 5 TABLET ORAL at 21:12

## 2023-05-24 RX ADMIN — Medication 250 MG: at 17:06

## 2023-05-24 RX ADMIN — LEVOTHYROXINE SODIUM 175 MCG: 75 TABLET ORAL at 05:45

## 2023-05-24 RX ADMIN — INSULIN LISPRO 2 UNITS: 100 INJECTION, SOLUTION INTRAVENOUS; SUBCUTANEOUS at 12:22

## 2023-05-24 RX ADMIN — ESCITALOPRAM OXALATE 5 MG: 5 TABLET, FILM COATED ORAL at 08:03

## 2023-05-24 RX ADMIN — TAMSULOSIN HYDROCHLORIDE 0.4 MG: 0.4 CAPSULE ORAL at 17:06

## 2023-05-24 RX ADMIN — INSULIN GLARGINE 25 UNITS: 100 INJECTION, SOLUTION SUBCUTANEOUS at 21:13

## 2023-05-24 RX ADMIN — CEFAZOLIN SODIUM 2000 MG: 2 SOLUTION INTRAVENOUS at 21:09

## 2023-05-24 RX ADMIN — INSULIN LISPRO 20 UNITS: 100 INJECTION, SOLUTION INTRAVENOUS; SUBCUTANEOUS at 08:05

## 2023-05-24 RX ADMIN — CEFAZOLIN SODIUM 2000 MG: 2 SOLUTION INTRAVENOUS at 13:36

## 2023-05-24 RX ADMIN — VERAPAMIL HYDROCHLORIDE 360 MG: 180 TABLET ORAL at 21:12

## 2023-05-24 RX ADMIN — SENNOSIDES AND DOCUSATE SODIUM 1 TABLET: 50; 8.6 TABLET ORAL at 08:03

## 2023-05-24 RX ADMIN — INSULIN LISPRO 20 UNITS: 100 INJECTION, SOLUTION INTRAVENOUS; SUBCUTANEOUS at 12:22

## 2023-05-24 RX ADMIN — PANTOPRAZOLE SODIUM 40 MG: 40 TABLET, DELAYED RELEASE ORAL at 05:45

## 2023-05-24 RX ADMIN — FERROUS SULFATE TAB 325 MG (65 MG ELEMENTAL FE) 325 MG: 325 (65 FE) TAB at 21:12

## 2023-05-24 RX ADMIN — INSULIN LISPRO 2 UNITS: 100 INJECTION, SOLUTION INTRAVENOUS; SUBCUTANEOUS at 08:04

## 2023-05-24 RX ADMIN — ATORVASTATIN CALCIUM 40 MG: 40 TABLET, FILM COATED ORAL at 08:03

## 2023-05-24 RX ADMIN — Medication 3 MG: at 21:13

## 2023-05-24 NOTE — PROGRESS NOTES
05/24/23 1231   Pain Assessment   Pain Assessment Tool 0-10   Pain Score 5   Pain Location/Orientation Orientation: Right;Location: Knee   Restrictions/Precautions   Precautions Bed/chair alarms; Fall Risk   RLE Weight Bearing Per Order NWB   ROM Restrictions   (AROM of R knee only)   Grooming   Able To Initiate Tasks; Wash/Dry Face;Brush/Clean Teeth   Limitation Noted In Safety   Shower/Bathe Self   Type of Assistance Needed Set-up / clean-up   Physical Assistance Level No physical assistance   Shower/Bathe Self CARE Score 5   Bathing   Assessed Bath Style Shower   Anticipated D/C Bath Style Shower;Sponge Bath   Able to Donna Bam No   Able to [employer] Temperature Yes   Able to Wash/Rinse/Dry (body part) Left Arm;Right Arm;L Upper Leg;R Upper Leg;L Lower Leg/Foot;R Lower Leg/Foot; Abdomen; Chest;Buttocks; Perineal Area   Limitations Noted in Balance; Endurance;ROM;Safety;Strength   Positioning Seated  (shifted side to side to clean buttocks)   Adaptive Equipment Shower Bars; Shower Seat;Hand [employer]   Limitations Noted In Balance; Endurance;ROM;Safety;LE Strength   Adaptive Equipment Grab Bars;Seat with out Back   Assessed Shower   Findings CG   Upper Body Dressing   Type of Assistance Needed Set-up / clean-up   Physical Assistance Level No physical assistance   Upper Body Dressing CARE Score 5   Lower Body Dressing   Type of Assistance Needed Physical assistance   Physical Assistance Level 25% or less   Comment assist to don shorts over R foot   Lower Body Dressing CARE Score 3   Putting On/Taking Off Footwear   Type of Assistance Needed Physical assistance   Physical Assistance Level 76% or more   Comment assist to doff/don bilat socks   Putting On/Taking Off Footwear CARE Score 2   Dressing/Undressing Clothing   Remove UB Clothes Pullover [place]   Remove LB Clothes Shorts;Socks   Don LB Clothes Shorts;Socks   Limitations Noted In Balance; Endurance; Safety;Strength;ROM   Positioning In Bed   Sit to Lying   Type of Assistance Needed Independent   Physical Assistance Level No physical assistance   Sit to Lying CARE Score 6   Lying to Sitting on Side of Bed   Type of Assistance Needed Independent   Physical Assistance Level No physical assistance   Lying to Sitting on Side of Bed CARE Score 6   Sit to Stand   Type of Assistance Needed Supervision   Physical Assistance Level No physical assistance   Comment stood briefly to pull pants down from buttocks, NWB RLE maintained   Sit to Stand CARE Score 4   Bed-Chair Transfer   Type of Assistance Needed Supervision   Physical Assistance Level No physical assistance   Comment sit pivot into w/c   Chair/Bed-to-Chair Transfer CARE Score 4   Transfer Bed/Chair/Wheelchair   Limitations Noted In Balance; Endurance;LE Strength   Cognition   Overall Cognitive Status WFL   Arousal/Participation Alert; Responsive; Cooperative   Attention Within functional limits   Orientation Level Oriented X4   Memory Within functional limits   Following Commands Follows all commands and directions without difficulty   Assessment   Treatment Assessment Pt agreeable to OT session this PM  Received lying supine in bed  ADL session completed; current LOF and details listed in respective sections  Pt is overall La LB dressing, set up UB dressing, oral care, and bathing; fxl transfers overall supervision/CG with NWB RLE maintained  Pt's RLE incision bleeding actively during ADL; RN and PA notified and OT assisted with dressing changes with pt supine in bed after shower  Pt continues to be an active participant in OT and is motivated to return home  Pt continues with barriers to d/c of decreased strength throughout but especially RLE s/p tibia fx, decreased balance, NWB RLE, and decreased activity tolerance; all affect independence in self care and fxl transfers   Pt would benefit from continued skilled OT services in order to address listed barriers and prepare for safe d/c  Prognosis Good   Problem List Decreased strength;Decreased range of motion;Decreased endurance; Impaired balance;Decreased safety awareness;Decreased skin integrity;Orthopedic restrictions;Pain   Plan   Treatment/Interventions ADL retraining;Functional transfer training;LE strengthening/ROM; Therapeutic exercise; Endurance training;Patient/family training;Equipment eval/education; Compensatory technique education;OT   Progress Progressing toward goals   OT Therapy Minutes   OT Time In 1231   OT Time Out 1330   OT Total Time (minutes) 59   OT Mode of treatment - Individual (minutes) 59

## 2023-05-24 NOTE — PROGRESS NOTES
05/24/23 1121   Pain Assessment   Pain Assessment Tool 0-10   Pain Score 5   Pain Location/Orientation Orientation: Right;Location: Knee   Restrictions/Precautions   Precautions Bed/chair alarms; Fall Risk   RLE Weight Bearing Per Order NWB   ROM Restrictions   (AROM of knee only)   Eating   Type of Assistance Needed Independent   Physical Assistance Level No physical assistance   Eating CARE Score 6   Exercise Tools   Other Exercise Tool 1 UE strengthening in bilat UE using 3# dumbbell, x30: elbow flexion/extension, protraction/retraction, internal/external rotation, pronation/supination, shoulder flexion/extension, shoulder elevation/depression   Cognition   Overall Cognitive Status WFL   Arousal/Participation Alert; Responsive; Cooperative   Attention Within functional limits   Orientation Level Oriented X4   Memory Within functional limits   Following Commands Follows all commands and directions without difficulty   Assessment   Treatment Assessment Pt seen for OT session this AM focusing on UE strengthening  Pt completed exercises in bed with HOB raised and took rest breaks as needed  Pt continues with barriers to d/c of decreased strength throughout but especially RLE s/p tibia fx, decreased balance, NWB RLE, and decreased activity tolerance; all affect independence in self care and fxl transfers  Pt would benefit from continued skilled OT services in order to address listed barriers and prepare for safe d/c  Prognosis Good   Problem List Decreased strength;Decreased range of motion;Decreased endurance; Impaired balance;Decreased safety awareness;Decreased skin integrity;Orthopedic restrictions;Pain   Plan   Treatment/Interventions ADL retraining;Functional transfer training;LE strengthening/ROM; Endurance training; Therapeutic exercise;Patient/family training;Equipment eval/education; Compensatory technique education;OT   Progress Progressing toward goals   OT Therapy Minutes   OT Time In 1121   OT Time Out 1202   OT Total Time (minutes) 41   OT Mode of treatment - Individual (minutes) 41

## 2023-05-24 NOTE — PROGRESS NOTES
PM&R PROGRESS NOTE:  Roxy Manrique 59 y o  male MRN: [id number]  Unit/Bed#: -01 Encounter: [id number]        Rehabilitation Diagnosis: Impairment of mobility, safety and Activities of Daily Living (ADLs) due to Orthopedic Disorders:  08 9  Other Orthopedic Closed fracture of medial plataeu of right tibia    HPI: Roxy Manrique is a 59 y o  male with a PMH significant for CKD, Diabetes mellitus, GERD, HLD and HTN who presented to the [facility] [place] [facility] on 05/01 for surgical management of a previous tibial plateau fracture  Patient seen in April for the same and opted out of surgical intervention at that time  Now, presented with varus malalignment, instability of the joint and significant comminution and bone void  He was taken to the OR with Dr Sonja Arcos on 05/01 and is s/p ORIF to the right tibial plateau  Ordered NWB status post-op for approximately 10-12 weeks with immobilizer in place for 2 weeks post-op  Post-op patient developed urinary retention  Urology consulted  Patient refused straight cath and was following urinary retention protocol to only get casey catheter insertion if needed  Nephrology consulted with CKD history, started on IV fluid resuscitation  Developed hyponatremia  Home HCTZ was held and IVF were stopped  Etiology determined to be SIADH secondary to HCTZ  Once HCTZ was stopped, sodium started to improve  Patient evaluated by PT/OT and deemed appropriate for post acute rehabilitation services  He was accepted to [facility] and arrived on 5/10/23  SUBJECTIVE: Patient seen and evaluated  Patient denies pain currently to RLE  Still with drainage, to be evaluated after shower today  Informed patient he is allowed active range of motion per ortho after concerns noted when he lays in bed and is unaware of bending the leg  He admits the area feels stiff and he experiences discomfort when this happens       ASSESSMENT: Stable, progressing      PLAN:    Rehabilitation  • Functional deficits: impaired mobility, self care, NWB RLE, pain RLE, sanguinous drainage     • Continue current rehabilitation plan of care to maximize function  • Functional update:   o PT:  Updated below   o OT:  Updated below    Physical Therapy Occupational Therapy Speech Therapy   Weight Bearing Status: Non-weight bearing (R LE)  Transfers: Supervision  Bed Mobility: Independent  Amulation Distance (ft): 0 feet  Ambulation: Total Assistance  Wheelchair Mobility Distance: 250 ft  Wheelchair Mobility: Independent  Number of Stairs: 0  Assistive Device for Stairs:  (Unable to attempt at this time due to [address])  Discharge Recommendations: Home with:  [address] with[de-identified] Family Support, First Floor Setup, Home Physical Therapy (vs alternative assistance)   Eating: Independent  Grooming: Supervision  Bathing: Minimal Assistance  Bathing: Minimal Assistance  Upper Body Dressing: Supervision  Lower Body Dressing: Minimal Assistance  Toileting: Minimal Assistance, Supervision  Tub/Shower Transfer: Incidental Touching  Toilet Transfer: Incidental Touching  Cognition: Within Defined Limits  Orientation: Person, Place, Time, Situation                   • Estimated Discharge: 5/30/23 with [facility] PT/OT/N      Pain  • Tylenol 650 mg Q8H PRN for mild pain  • Robaxin 500 mg Q6H PRN for muscle spasms     DVT prophylaxis  • Lovenox sq inj 40 mg daily     Bladder plan  • Continent     Bowel plan  • Continent   • Last BM 5/23/23  · Continue Senna daily HS, Miralax daily PRN       Skin care  · Monitor skin daily  · Apply skin nourishing cream  · Reposition Q2H to offload pressure  · Elevate heels off bed  · Monitor incision site  · RLE : cleanse leg with soap/water, change dressing with soilage/dislodgement and after showers  Can be done BID Cover with abd pad, wrap with ACE  · Left anterior shin/left plantar foot: cleanse wound beds with NSS, pat dry   Apply maxorb ag to plantar foor wound and cover with bordered gauze  Place dermagran on the left shin wound, cover with bordered gauze  Change dressings daily and PRN     Stage 3b chronic kidney disease [facility])  Assessment & Plan  Lab Results   Component Value Date    CREATININE 0 68 05/22/2023    CREATININE 0 77 05/17/2023    CREATININE 0 70 05/15/2023    EGFR 103 05/22/2023    EGFR 98 05/17/2023    EGFR 102 05/15/2023   · Monitor periodically  · Followed in acute care with Nephrology  · S/P IV Fluids  · HCTZ d/c in setting of hyponatremia     Type 2 diabetes mellitus with diabetic peripheral angiopathy without gangrene [facility])  Assessment & Plan  Lab Results   Component Value Date    HGBA1C 7 9 (H) 04/24/2023       Recent Labs     05/23/23  1151 05/23/23  1632 05/23/23 2011 05/24/23  0711   POCGLU 124 75 144* 179*       Blood Sugar Average: Last 72 hrs:  (P) 952 [id number]   · Monitor accu cheks and adjust regimen as needed   · Continue Lantus 25 units daily HS, 20 units humalog TID AC, SSI  · Home: Lantus 30 units daily HS, Humalog 20 units TID AC  · Carb controlled diet     Acquired hypothyroidism  Assessment & Plan  · Levothyroxine 150 mcg daily increased to 175 mcg in setting of elevated TSH  · 11/14- 35 400, 5/15- 26 562  · Will need repeat TSH at next PCP follow up      Insomnia  Assessment & Plan  · Continue Ambien 10 mg HS PRN and Melatonin     PAD (peripheral artery disease) ([facility])  Assessment & Plan  · Home Plavix 75 mg daily currently on hold with Xarelto added for DVT ppx  · May resume once Xarelto is completed      Hyponatremia  Assessment & Plan  · Seen by Nephrology in acute care  · Etiology determined to be SIADH secondary to HCTZ  · HCTZ since stopped   · 134 5/22    Hyperlipidemia  Assessment & Plan  · Continue Lipitor 40 mg     Gastroesophageal reflux disease without esophagitis  Assessment & Plan  · Continue Protonix 40 mg daily  · Home: Prilosec 40 mg daily    Hypertension, essential  Assessment & Plan  · Monitor vitals  · Continue Verapamil 360 mg daily HS  · Low reading today at 89/64  Patient is asymptomatic, will monitor    * Closed fracture of medial plateau of right tibia  Assessment & Plan  · Complex history   · Arrived 5/1 for surgical intervention of ongoing right tibial plateau fracture  · Previously in SNF but found to have varus malalignment, instability of the joint, significant comminution and bone void  · S/p ORIF on 05/01 with Dr Rafael Jorge  · NWB for 10-12 weeks  · Followed up with Dr Patti Gilmore on 5/16/23  · For DVT ppx x6 weeks since post-op- Lovenox discontinued per patient's request  Sulma Davidson initiated  · Continue Xarelto for remainder of 6 weeks  · Price checked at $50 for 30-day supply  · Home aspirin and Plavix held while on Xarelto, may resume once Xarelto is completed   · Noted to have Cellulitis levi-incision site at Ortho follow up appointment  · Continue IV Ancef day 7/14  Patient started the antibiotic on 5/16/23 around 2100  · Area has been draining a moderate amount of sanguinous drainage requiring a change in dressing change orders from as needed to BID  Steri strips now applied between Nylon sutures  · Continue Probiotic BID while on antibiotic  · Gentle ROM of the knee, ok for AROM, hold PROM at this time   · Follow up in 3 weeks with ortho for repeat XR  · Sutures will not be removed until patient follows up with Ortho as outpatient  · Repeat XR 5/21: improved alignment of proximal tibial fractures s/p ORIF  Intact hardware   · Acute chomprehensive interdisciplinary inpatient rehabilitation to include intensive skilled therapies as outlines with oversight and management by a rehabilitation Physician Assistant overseen by rehabilitation physician as well as inpatient rehabilitation nursing, case management and weekly interdisciplinary team meeting            Appreciate IM consultants medical co-management  Labs, medications, and imaging personally reviewed        ROS:  A ten point review of "systems was completed and pertinent positives are listed in subjective section  All other systems reviewed were negative  Review of Systems   Constitutional: Negative  HENT: Negative  Respiratory: Negative  Negative for shortness of breath  Cardiovascular: Negative  Negative for chest pain  Gastrointestinal: Negative  Negative for abdominal pain and constipation  Genitourinary: Negative  Negative for difficulty urinating  Musculoskeletal: Positive for myalgias  Stiffness to right knee   Neurological: Negative  Psychiatric/Behavioral: Negative  OBJECTIVE:   BP (!) 89/64 (BP Location: Right arm)   Pulse 68   Temp (!) 97 4 °F (36 3 °C) (Temporal)   Resp 18   Ht 6' 4\" (1 93 m)   Wt 123 kg (270 lb 8 1 oz)   SpO2 97%   BMI 32 93 kg/m²     Physical Exam  Vitals reviewed  Constitutional:       General: He is not in acute distress  Appearance: He is not ill-appearing  HENT:      Head: Normocephalic and atraumatic  Eyes:      Pupils: Pupils are equal, round, and reactive to light  Cardiovascular:      Rate and Rhythm: Normal rate and regular rhythm  Pulses: Normal pulses  Heart sounds: Normal heart sounds  No murmur heard  Pulmonary:      Effort: Pulmonary effort is normal  No respiratory distress  Breath sounds: Normal breath sounds  Abdominal:      General: Bowel sounds are normal  There is no distension  Palpations: Abdomen is soft  Musculoskeletal:      Right lower leg: Edema present  Skin:     General: Skin is warm and dry  Comments: Incision will be evaluated after shower with OT this afternoon   Neurological:      General: No focal deficit present  Mental Status: He is alert and oriented to person, place, and time     Psychiatric:         Mood and Affect: Mood normal          Behavior: Behavior normal           Lab Results   Component Value Date    HCT 26 5 (L) 05/22/2023    HGB 8 3 (L) 05/22/2023    MCV 95 05/22/2023    PLT " 414 (H) 05/22/2023    WBC 7 71 05/22/2023     Lab Results   Component Value Date    BUN 16 05/22/2023    CALCIUM 8 2 (L) 05/22/2023     05/22/2023    CO2 26 05/22/2023    CREATININE 0 68 05/22/2023    GLUC 168 (H) 05/22/2023    K 3 5 05/22/2023    SODIUM 134 (L) 05/22/2023     Lab Results   Component Value Date    INR 1 04 08/04/2020    INR 0 99 11/15/2018    PROTIME 13 8 08/04/2020    PROTIME 13 0 11/15/2018           Current Facility-Administered Medications:   •  acetaminophen (TYLENOL) tablet 650 mg, 650 mg, Oral, Q8H PRN, KATE Morin-C, 650 mg at 05/23/23 2124  •  atorvastatin (LIPITOR) tablet 40 mg, 40 mg, Oral, QAM, KATE Morin-C, 40 mg at 05/24/23 0803  •  ceFAZolin (ANCEF) IVPB (premix in dextrose) 2,000 mg 50 mL, 2,000 mg, Intravenous, Q8H, Juhi Rm PA-C, Last Rate: 100 mL/hr at 05/24/23 0545, 2,000 mg at 05/24/23 0545  •  escitalopram (LEXAPRO) tablet 5 mg, 5 mg, Oral, Daily, KATE Morin-C, 5 mg at 05/24/23 3887  •  ferrous sulfate tablet 325 mg, 325 mg, Oral, HS, KATE Morin-C, 325 mg at 05/23/23 2124  •  insulin glargine (LANTUS) subcutaneous injection 25 Units 0 25 mL, 25 Units, Subcutaneous, HS, KATE Morin-C, 25 Units at 05/23/23 2123  •  insulin lispro (HumaLOG) 100 units/mL subcutaneous injection 2-12 Units, 2-12 Units, Subcutaneous, 4x Daily (AC & HS), 2 Units at 05/24/23 0804 **AND** Fingerstick Glucose (POCT), , , 4x Daily AC and at bedtime, Juhi Rm PA-C  •  insulin lispro (HumaLOG) 100 units/mL subcutaneous injection 20 Units, 20 Units, Subcutaneous, TID With Meals, Juhi Rm PA-C, 20 Units at 05/24/23 0805  •  levothyroxine tablet 175 mcg, 175 mcg, Oral, Early Morning, Juhi Rm PA-C, 175 mcg at 05/24/23 0545  •  magnesium hydroxide (MILK OF MAGNESIA) oral suspension 30 mL, 30 mL, Oral, Daily PRN, Pam Taylor MD  •  melatonin tablet 3 mg, 3 mg, Oral, HS, Pam Taylor MD, 3 mg at 05/23/23 2124  •  methocarbamol (ROBAXIN) tablet 500 mg, 500 mg, Oral, Q6H PRN, Pili Carry, PA-C  •  ondansetron (ZOFRAN-ODT) dispersible tablet 4 mg, 4 mg, Oral, Q6H PRN, Pili Carry, PA-C  •  pantoprazole (PROTONIX) EC tablet 40 mg, 40 mg, Oral, Early Morning, Pili Carry, PA-C, 40 mg at 05/24/23 0568  •  polyethylene glycol (MIRALAX) packet 17 g, 17 g, Oral, Daily PRN, Pili Carry, PA-C  •  rivaroxaban (XARELTO) tablet 10 mg, 10 mg, Oral, Daily With Breakfast, Pili Carry, PA-C, 10 mg at 05/24/23 5836  •  saccharomyces boulardii (FLORASTOR) capsule 250 mg, 250 mg, Oral, BID, Pili Carry, PA-C, 250 mg at 05/24/23 3453  •  senna-docusate sodium (SENOKOT S) 8 6-50 mg per tablet 1 tablet, 1 tablet, Oral, Daily, Pili Carry, PA-C, 1 tablet at 05/24/23 3984  •  tamsulosin (FLOMAX) capsule 0 4 mg, 0 4 mg, Oral, Daily With [place], PA-C, 0 4 mg at 05/23/23 1611  •  verapamil (CALAN-SR) CR tablet 360 mg, 360 mg, Oral, HS, Pili Carry, PA-C, 360 mg at 05/23/23 2123  •  zolpidem (AMBIEN) tablet 10 mg, 10 mg, Oral, HS PRN, Pili Carry, PA-C, 10 mg at 05/23/23 2124    Past Medical History:   Diagnosis Date   • Broken internal right [facility] 01/2023   • Chronic kidney disease    • Colon polyp    • Constipation 03/09/2023   • Diabetes mellitus ([facility] )    • Disease of thyroid gland    • GERD (gastroesophageal reflux disease)    • HHS vs DKA 11/16/2018   • Hyperlipidemia    • Hypertension    • Thyroid cancer [facility])        Patient Active Problem List    Diagnosis Date Noted   • MANNY (obstructive sleep apnea) 05/01/2023   • Closed fracture of medial plateau of right tibia 04/13/2023   • Depression and anxiety 03/03/2023   • Acquired genu varum of right lower extremity 02/21/2023   • MARKELL (acute kidney injury) ([facility] ) 02/14/2023   • Chronic kidney disease-mineral and bone disorder 02/14/2023   • Closed fracture of right tibial plateau 18/26/5970   • Stage 3b chronic kidney disease ([facility] ) 11/15/2022   • Hyperkalemia 11/15/2022   • Alcoholism ([facility] ) 11/15/2022   • Dupuytren's contracture of right hand 06/07/2022   • Type 2 diabetes mellitus with diabetic peripheral angiopathy without gangrene ([facility] ) 02/07/2022   • Physical deconditioning 12/21/2021   • Acquired hypothyroidism 10/18/2021   • Acquired absence of right foot ([place] [address] ) 03/29/2021   • Persistent proteinuria 01/12/2021   • Abnormal EKG 12/03/2020   • Preoperative examination 12/03/2020   • Insomnia 12/03/2020   • Acute blood loss anemia 10/08/2020   • Hypomagnesemia 09/09/2020   • Urinary retention 09/08/2020   • Thyroid cancer ([place] [address] ) 08/20/2020   • PAD (peripheral artery disease) ([place] [address] ) 08/05/2020   • Hyponatremia 08/04/2020   • Health maintenance examination 07/06/2020   • Elevated liver enzymes 10/24/2019   • Colon polyps 09/12/2019   • Right-sided tinnitus 11/26/2018   • Hypertension, essential 09/24/2018   • Gastroesophageal reflux disease without esophagitis 09/24/2018   • Obesity, Class I, BMI 30 0-34 9 (see actual BMI) 03/25/2010   • Hyperlipidemia 12/17/2009   • History of nonadherence to medical treatment 02/20/2008          Juhi Rm PA-C    I have spent a total time of 35 minutes on 05/24/23 in caring for this patient including Instructions for management, Patient and family education, Risk factor reductions, Impressions, Counseling / Coordination of care, Documenting in the medical record, Reviewing / ordering tests, medicine, procedures  , Obtaining or reviewing history   and Communicating with other healthcare professionals   ** Please Note:  voice to text software may have been used in the creation of this document   Although proof errors in transcription or interpretation are a potential of such software**

## 2023-05-24 NOTE — PROGRESS NOTES
05/24/23 0900   Pain Assessment   Pain Assessment Tool 0-10   Pain Score 4   Pain Location/Orientation Orientation: Right;Location: Knee; Location: Incision   Restrictions/Precautions   Precautions Bed/chair alarms; Fall Risk  ([address] right LE, gentle AROM only, no PROM)   RLE Weight Bearing Per Order NWB   ROM Restrictions Yes  (Cleared for gentle ROM of the knee - ok for AROM, no PROM at this time)   Cognition   Overall Cognitive Status WFL   Subjective   Subjective Dressing saturated, nsg alerted   Roll Left and Right   Type of Assistance Needed Independent   Physical Assistance Level No physical assistance   Roll Left and Right CARE Score 6   Sit to Lying   Type of Assistance Needed Independent   Physical Assistance Level No physical assistance   Sit to Lying CARE Score 6   Lying to Sitting on Side of Bed   Type of Assistance Needed Independent   Physical Assistance Level No physical assistance   Lying to Sitting on Side of Bed CARE Score 6   Sit to Stand   Type of Assistance Needed Physical assistance   Physical Assistance Level 26%-50%   Comment Pull to  parallel bars  (1:40, 1:44, 0:40 per trial, rest periods between sessions)   Sit to Stand CARE Score 3   Bed-Chair Transfer   Type of Assistance Needed Supervision   Physical Assistance Level No physical assistance   Comment NWBing R LE, assist to maintain wheelchair in place   Chair/Bed-to-Chair Transfer CARE Score 4   Car Transfer   Reason if not Attempted Environmental limitations   Car Transfer CARE Score 10   Walk 10 Feet   Reason if not Attempted Safety concerns   Walk 10 Feet CARE Score 88   Walk 50 Feet with Two Turns   Reason if not Attempted Safety concerns   Walk 50 Feet with Two Turns CARE Score 88   Walk 150 Feet   Reason if not Attempted Safety concerns   Walk 150 Feet CARE Score 88   Walking 10 Feet on Uneven Surfaces   Reason if not Attempted Safety concerns   Walking 10 Feet on Uneven Surfaces CARE Score 88   Ambulation   Does the patient walk? 0  No, and walking goal is not clinically indicated  Wheel 50 Feet with Two Turns   Type of Assistance Needed Independent   Physical Assistance Level No physical assistance   Comment Level surface x 75 feet, uneven surface on concrete x 50 feet   Wheel 50 Feet with Two Turns CARE Score 6   Wheel 150 Feet   Type of Assistance Needed Independent   Physical Assistance Level No physical assistance   Comment level surface, carpet, tile, concrete   Wheel 150 Feet CARE Score 6   Wheelchair mobility   Does the patient use a wheelchair? 1  Yes   Type of Wheelchair Used 1  Manual   Method Right upper extremity; Left upper extremity; Left lower extremity   Curb or Single Stair   Reason if not Attempted Safety concerns   1 Step (Curb) CARE Score 88   4 Steps   Reason if not Attempted Safety concerns   4 Steps CARE Score 88   12 Steps   Reason if not Attempted Safety concerns   12 Steps CARE Score 88   Stairs   Type Ramp   Findings [place]/parking lot surface  Inclines/declines using left LE as primary , UEs for braking and course correction  VCs for sequence and technique with focus on energy conservation  Picking Up Object   Comment Unable to stand with unilateral UE support at this time   Reason if not Attempted Safety concerns   Picking Up Object CARE Score 88   Toilet Transfer   Comment Not required during this session   Therapeutic Interventions   Strengthening Supine LE TE   Assessment   Treatment Assessment Patient presents supine in bed, NAD  Patient noted with drainage from right knee with saturated dressing  Nsg made aware  Patient continues with limited endurance  Demonstrates improved standing tolerance, however, this standing endurance is still less than what would be required for typical standing ADL activity  He continues to be motivated to return home, however, remains with elevated burden of care level    Ongoing therapy will allow for reduction of this care burden as well as allowing safe transition home  Ongoing increase in fall risk, limited endurance for functional activities  Strict NWBing has continued  Cont per POC and progress as able  Problem List Decreased strength;Decreased range of motion;Decreased endurance; Impaired balance;Decreased safety awareness;Decreased skin integrity;Orthopedic restrictions;Pain   PT Barriers   Physical Impairment Decreased strength;Decreased endurance; Impaired balance;Decreased mobility;Pain;Orthopedic restrictions   Functional Limitation Stair negotiation;Standing;Transfers; Walking; Wheelchair management   Plan   Treatment/Interventions ADL retraining;Functional transfer training;LE strengthening/ROM; Elevations; Therapeutic exercise; Endurance training;Patient/family training;Equipment eval/education; Bed mobility;Gait training   Progress Slow progress, medical status limitations   PT Therapy Minutes   PT Time In 0900   PT Time Out 1030   PT Total Time (minutes) 90   PT Mode of treatment - Individual (minutes) 90   PT Mode of treatment - Concurrent (minutes) 0   PT Mode of treatment - Group (minutes) 0   PT Mode of treatment - Co-treat (minutes) 0   PT Mode of Treatment - Total time(minutes) 90 minutes   PT Cumulative Minutes 917     Supine LE TE:  3x10, Left only 2/2 dressing weeping  SLR - flexion  SLR - ABd - anti-gravity  GS  SAQ  Hip ADd - yvonne    Patient returned to bed, all needs in reach  Alarm in place and activated  Encouraged use of call bell, patient verbalizes understanding

## 2023-05-24 NOTE — WOUND OSTOMY CARE
Progress Note - Wound   Oscar Farmer 59 y o  male MRN: [id number]  Unit/Bed#: -01 Encounter: [id number]      Assessment:   Wound care weekly follow up for patient in ARU s/p ORIF to right tibia plateau  Patient in bed for assessment, he is awake, alert and oriented  Ace wrap to right lower leg changed this PM by nursing  Per nursing, there is serosanguineous drainage from the incision line, he states the steri-strips were changed due to saturation  Provider aware of incision drainage  He is not incontinent  Per RN, he repositions himself in bed  He is able to feed himself and is a mod assist with hygiene care  He is NWB to the right leg  Follow up with [facility] for left foot diabetic ulcer    Findings  1  POA-chronic traumatic wound to the left anterior pretibial wound is resolving, dry brown intact scab to the proximal and distal ends, no drainage, no erythema or edema  2  POA-left plantar foot diabetic ulcer, wound bed is pink epithelial tissue to the proximal edge, wound bed is dry, pale yellow center with approx 5% beefy red area at the proximal edge of yellow base, scant serosanguineous drainage, no erythema or edema noted    Skin and Wound Care Plan:   1  Offloading cushion to chair/wheelchair when OOB  2  Elevate heels off of bed with pillows to offload  3  Apply skin nourishing cream to the skin daily  4  Turn and reposition patient Q2 hours in bed, more frequently while in the chair/wheelchair  5  Cleanse wound beds to left anterior shin and left plantar foot with NSS, pat dry  Apply Maxorb ag to the plantar foot wound and cover with bordered gauze  Change dressing daily and PRN  6  Right leg wound treatment per surgery orders    Wound:  Wound 05/01/23 Leg Right (Active)   Wound Image   05/22/23 1142   Wound Description HUMA 05/23/23 1930   Ana-wound Assessment HUMA 05/23/23 1930   Wound Site Closure Sutures; Adhesive closure strips 05/23/23 1700   Drainage Amount Large 05/23/23 1700 Drainage Description Serosanguineous 05/23/23 1700   Treatments Cleansed;Site care 05/23/23 1700   Dressing ABD;Dry dressing 05/23/23 0639   Dressing Changed Changed 05/23/23 1700   Patient Tolerance Tolerated well 05/23/23 1700   Dressing Status Clean;Dry; Intact 05/24/23 1640       Wound 05/08/23 Pretibial Left (Active)   Wound Image   05/24/23 1639   Wound Description Intact; Brown 05/24/23 1639   Ana-wound Assessment Intact 05/24/23 1639   Wound Length (cm) 0 cm 05/24/23 1639   Wound Width (cm) 0 cm 05/24/23 1639   Wound Depth (cm) 0 cm 05/24/23 1639   Wound Surface Area (cm^2) 0 cm^2 05/24/23 1639   Wound Volume (cm^3) 0 cm^3 05/24/23 1639   Calculated Wound Volume (cm^3) 0 cm^3 05/24/23 1639   Change in Wound Size % 100 05/24/23 1639   Drainage Amount None 05/24/23 1639   Drainage Description Clear;Yellow 05/19/23 1312   Non-staged Wound Description Partial thickness 05/24/23 1639   Treatments Site care 05/23/23 1700   Dressing Open to air 05/24/23 1639   Wound packed? No 05/22/23 0716   Dressing Changed Reinforced 05/19/23 1312   Patient Tolerance Tolerated well 05/24/23 1639   Dressing Status Reinforced 05/19/23 1312       Wound 05/10/23 Diabetic Ulcer Other (Comment) Foot Left (Active)   Wound Image   05/24/23 1640   Wound Description Beefy red;Pink 05/24/23 1640   Ana-wound Assessment Dry; Intact 05/24/23 1640   Wound Length (cm) 1 cm 05/24/23 1640   Wound Width (cm) 0 8 cm 05/24/23 1640   Wound Depth (cm) 0 1 cm 05/24/23 1640   Wound Surface Area (cm^2) 0 8 cm^2 05/24/23 1640   Wound Volume (cm^3) 0 08 cm^3 05/24/23 1640   Calculated Wound Volume (cm^3) 0 08 cm^3 05/24/23 1640   Drainage Amount Scant 05/24/23 1640   Drainage Description Serosanguineous 05/24/23 1640   Non-staged Wound Description Full thickness 05/24/23 1640   Treatments Cleansed;Site care 05/23/23 1700   Dressing Calcium Alginate;Dry dressing 05/24/23 1640   Dressing Changed Other (Comment) 05/24/23 1640   Patient Tolerance Tolerated well 05/24/23 1640   Dressing Status Clean;Dry; Intact 05/24/23 0800       Wound 05/22/23 Tibial Left;Posterior;Proximal (Active)   Pressure Injury Stage O 05/22/23 1248   Ana-wound Assessment Clean;Dry; Intact 05/23/23 1700   Treatments Site care 05/23/23 1700       Wound 05/22/23 Pretibial Proximal;Right (Active)   Dressing Status Clean;Dry; Intact 05/24/23 1640     Reviewed plan of care with primary RN Clinton Royal  Recommendations written as orders  Wound care team to follow weekly while admitted  Questions or concerns [facility] Nurse    Angelina REYNAGAN, RN, [employer]

## 2023-05-25 LAB
GLUCOSE SERPL-MCNC: 109 MG/DL (ref 65–140)
GLUCOSE SERPL-MCNC: 182 MG/DL (ref 65–140)
GLUCOSE SERPL-MCNC: 217 MG/DL (ref 65–140)
GLUCOSE SERPL-MCNC: 74 MG/DL (ref 65–140)

## 2023-05-25 PROCEDURE — 99232 SBSQ HOSP IP/OBS MODERATE 35: CPT | Performed by: PHYSICAL MEDICINE & REHABILITATION

## 2023-05-25 PROCEDURE — 97110 THERAPEUTIC EXERCISES: CPT

## 2023-05-25 PROCEDURE — 97535 SELF CARE MNGMENT TRAINING: CPT

## 2023-05-25 PROCEDURE — 97530 THERAPEUTIC ACTIVITIES: CPT

## 2023-05-25 PROCEDURE — 82948 REAGENT STRIP/BLOOD GLUCOSE: CPT

## 2023-05-25 PROCEDURE — 97542 WHEELCHAIR MNGMENT TRAINING: CPT

## 2023-05-25 RX ORDER — BISACODYL 5 MG/1
5 TABLET, DELAYED RELEASE ORAL DAILY PRN
Status: DISCONTINUED | OUTPATIENT
Start: 2023-05-25 | End: 2023-05-30 | Stop reason: HOSPADM

## 2023-05-25 RX ADMIN — CEFAZOLIN SODIUM 2000 MG: 2 SOLUTION INTRAVENOUS at 05:20

## 2023-05-25 RX ADMIN — ATORVASTATIN CALCIUM 40 MG: 40 TABLET, FILM COATED ORAL at 08:13

## 2023-05-25 RX ADMIN — ZOLPIDEM TARTRATE 10 MG: 5 TABLET ORAL at 21:21

## 2023-05-25 RX ADMIN — INSULIN LISPRO 20 UNITS: 100 INJECTION, SOLUTION INTRAVENOUS; SUBCUTANEOUS at 12:48

## 2023-05-25 RX ADMIN — SENNOSIDES AND DOCUSATE SODIUM 1 TABLET: 50; 8.6 TABLET ORAL at 08:13

## 2023-05-25 RX ADMIN — LEVOTHYROXINE SODIUM 175 MCG: 75 TABLET ORAL at 05:20

## 2023-05-25 RX ADMIN — INSULIN LISPRO 20 UNITS: 100 INJECTION, SOLUTION INTRAVENOUS; SUBCUTANEOUS at 08:13

## 2023-05-25 RX ADMIN — Medication 250 MG: at 08:13

## 2023-05-25 RX ADMIN — PANTOPRAZOLE SODIUM 40 MG: 40 TABLET, DELAYED RELEASE ORAL at 05:21

## 2023-05-25 RX ADMIN — Medication 250 MG: at 17:20

## 2023-05-25 RX ADMIN — INSULIN LISPRO 4 UNITS: 100 INJECTION, SOLUTION INTRAVENOUS; SUBCUTANEOUS at 12:48

## 2023-05-25 RX ADMIN — RIVAROXABAN 10 MG: 10 TABLET, FILM COATED ORAL at 08:13

## 2023-05-25 RX ADMIN — ESCITALOPRAM OXALATE 5 MG: 5 TABLET, FILM COATED ORAL at 08:13

## 2023-05-25 RX ADMIN — Medication 3 MG: at 21:21

## 2023-05-25 RX ADMIN — ACETAMINOPHEN 650 MG: 325 TABLET ORAL at 21:21

## 2023-05-25 RX ADMIN — VERAPAMIL HYDROCHLORIDE 360 MG: 180 TABLET ORAL at 21:21

## 2023-05-25 RX ADMIN — INSULIN LISPRO 20 UNITS: 100 INJECTION, SOLUTION INTRAVENOUS; SUBCUTANEOUS at 17:20

## 2023-05-25 RX ADMIN — CEFAZOLIN SODIUM 2000 MG: 2 SOLUTION INTRAVENOUS at 13:41

## 2023-05-25 RX ADMIN — TAMSULOSIN HYDROCHLORIDE 0.4 MG: 0.4 CAPSULE ORAL at 17:20

## 2023-05-25 RX ADMIN — CEFAZOLIN SODIUM 2000 MG: 2 SOLUTION INTRAVENOUS at 21:16

## 2023-05-25 RX ADMIN — INSULIN GLARGINE 25 UNITS: 100 INJECTION, SOLUTION SUBCUTANEOUS at 21:21

## 2023-05-25 NOTE — PLAN OF CARE
Problem: PAIN - ADULT  Goal: Verbalizes/displays adequate comfort level or baseline comfort level  Description: Interventions:  - Encourage patient to monitor pain and request assistance  - Assess pain using appropriate pain scale  - Administer analgesics based on type and severity of pain and evaluate response  - Implement non-pharmacological measures as appropriate and evaluate response  - Consider cultural and social influences on pain and pain management  - Notify physician/advanced practitioner if interventions unsuccessful or patient reports new pain  Outcome: Progressing     Problem: INFECTION - ADULT  Goal: Absence or prevention of progression during hospitalization  Description: INTERVENTIONS:  - Assess and monitor for signs and symptoms of infection  - Monitor lab/diagnostic results  - Monitor all insertion sites, i e  indwelling lines, tubes, and drains  - Monitor endotracheal if appropriate and nasal secretions for changes in amount and color  - [place] appropriate cooling/warming therapies per order  - Administer medications as ordered  - Instruct and encourage patient and family to use good hand hygiene technique  - Identify and instruct in appropriate isolation precautions for identified infection/condition  Outcome: Progressing  Goal: Absence of fever/infection during neutropenic period  Description: INTERVENTIONS:  - Monitor WBC    Outcome: Progressing

## 2023-05-25 NOTE — PLAN OF CARE
Problem: PAIN - ADULT  Goal: Verbalizes/displays adequate comfort level or baseline comfort level  Description: Interventions:  - Encourage patient to monitor pain and request assistance  - Assess pain using appropriate pain scale  - Administer analgesics based on type and severity of pain and evaluate response  - Implement non-pharmacological measures as appropriate and evaluate response  - Consider cultural and social influences on pain and pain management  - Notify physician/advanced practitioner if interventions unsuccessful or patient reports new pain  Outcome: Progressing     Problem: INFECTION - ADULT  Goal: Absence or prevention of progression during hospitalization  Description: INTERVENTIONS:  - Assess and monitor for signs and symptoms of infection  - Monitor lab/diagnostic results  - Monitor all insertion sites, i e  indwelling lines, tubes, and drains  - Monitor endotracheal if appropriate and nasal secretions for changes in amount and color  - [place] appropriate cooling/warming therapies per order  - Administer medications as ordered  - Instruct and encourage patient and family to use good hand hygiene technique  - Identify and instruct in appropriate isolation precautions for identified infection/condition  Outcome: Progressing     Problem: INFECTION - ADULT  Goal: Absence of fever/infection during neutropenic period  Description: INTERVENTIONS:  - Monitor WBC    Outcome: Progressing     Problem: SAFETY ADULT  Goal: Patient will remain free of falls  Description: INTERVENTIONS:  - Educate patient/family on patient safety including physical limitations  - Instruct patient to call for assistance with activity   - Consult OT/PT to assist with strengthening/mobility   - Keep Call bell within reach  - Keep bed low and locked with side rails adjusted as appropriate  - Keep care items and personal belongings within reach  - Initiate and maintain comfort rounds  - Make Fall Risk Sign visible to staff  - Apply yellow socks and bracelet for high fall risk patients  - Consider moving patient to room near nurses station  Outcome: Progressing     Problem: DISCHARGE PLANNING  Goal: Discharge to home or other facility with appropriate resources  Description: INTERVENTIONS:  - Identify barriers to discharge w/patient and caregiver  - Arrange for needed discharge resources and transportation as appropriate  - Identify discharge learning needs (meds, wound care, etc )  - Arrange for interpretive services to assist at discharge as needed  - Refer to Case Management Department for coordinating discharge planning if the patient needs post-hospital services based on physician/advanced practitioner order or complex needs related to functional status, cognitive ability, or social support system  Outcome: Progressing     Problem: Potential for Falls  Goal: Patient will remain free of falls  Description: INTERVENTIONS:  - Educate patient/family on patient safety including physical limitations  - Instruct patient to call for assistance with activity   - Consult OT/PT to assist with strengthening/mobility   - Keep Call bell within reach  - Keep bed low and locked with side rails adjusted as appropriate  - Keep care items and personal belongings within reach  - Initiate and maintain comfort rounds  - Make Fall Risk Sign visible to staff  - Apply yellow socks and bracelet for high fall risk patients  - Consider moving patient to room near nurses station  Outcome: Progressing

## 2023-05-25 NOTE — PROGRESS NOTES
05/25/23 1230   Pain Assessment   Pain Assessment Tool 0-10   Pain Score 5   Pain Location/Orientation Orientation: Right;Location: Neck   Pain Onset/Description Onset: Ongoing   Patient's Stated Pain Goal No pain   Hospital Pain Intervention(s) Medication (See MAR); Repositioned; Rest   Multiple Pain Sites No   Restrictions/Precautions   Precautions Bed/chair alarms; Fall Risk;Pain   Weight Bearing Restrictions Yes   RLE Weight Bearing Per Order NWB   Bed-Chair Transfer   Type of Assistance Needed Supervision   Physical Assistance Level No physical assistance   Comment sit pivot transfer bed>w/c>bed   Chair/Bed-to-Chair Transfer CARE Score 4   Exercise Tools   UE Ergometer 15 mins forward and backwards   Hand Gripper 2 sets X 30 reps   Other Exercise Tool 1 Inst  pt  in theraband exercises for BUEs  Pt  completed 1 set X 15 reps in pro/retraction  Cognition   Overall Cognitive Status WFL   Arousal/Participation Alert; Responsive   Attention Within functional limits   Orientation Level Oriented X4   Memory Within functional limits   Following Commands Follows all commands and directions without difficulty   Comments   (Continues to display self limiting behaviors)   Additional Activities   Additional Activities Comments w/c mobility to and from room approx  50 ft  Activity Tolerance   Activity Tolerance Patient limited by fatigue   Assessment   Treatment Assessment Patient participated in Skilled OT session this date with interventions consisting of safety awareness and fall prevention techniques and therapeutic exercise to: increase functional use of BUEs, increase BUE muscle strength    Patient agreeable to OT treatment session, upon arrival patient was found supine in bed   Patient requiring verbal cues for correct technique and frequent rest periods   Patient continues to be functioning below baseline level, occupational performance remains limited secondary to factors listed above and increased risk for falls and injury  From OT standpoint, recommendation at time of d/c would be Home OT  Patient to benefit from continued Occupational Therapy treatment while in the hospital to address deficits as defined above and maximize level of functional independence with ADLs and functional mobility  Prognosis Good   Problem List Decreased strength;Decreased range of motion;Decreased endurance; Impaired balance;Decreased safety awareness;Decreased skin integrity;Orthopedic restrictions;Pain   Plan   Treatment/Interventions Functional transfer training; Therapeutic exercise; Endurance training;Patient/family training;OT   Progress Progressing toward goals   Recommendation   OT Discharge Recommendation Home with home health rehabilitation   Additional Comments  Pt  agreeable to OT treatment   OT Therapy Minutes   OT Time In 1230   OT Time Out 1330   OT Total Time (minutes) 60   Therapy Time missed   Time missed?  No     CHRISTEN Saeed/VINI

## 2023-05-25 NOTE — PROGRESS NOTES
05/25/23 1000   Pain Assessment   Pain Assessment Tool 0-10   Pain Score 4   Pain Location/Orientation Orientation: Right;Location: Leg   Pain Onset/Description Onset: Ongoing; Descriptor: Aching   Effect of Pain on Daily Activities decreased mobility   Patient's Stated Pain Goal No pain   Hospital Pain Intervention(s) Medication (See MAR); Repositioned; Ambulation/increased activity; Emotional support   Restrictions/Precautions   Precautions Bed/chair alarms; Fall Risk;Pain  (WBS)   Weight Bearing Restrictions Yes   RLE Weight Bearing Per Order NWB   ROM Restrictions   (gentle AROM R knee)   Cognition   Overall Cognitive Status WFL   Arousal/Participation Alert; Responsive   Attention Within functional limits   Orientation Level Oriented X4   Memory Within functional limits   Following Commands Follows all commands and directions without difficulty   Comments exhibits self limiting behaviors   Roll Left and Right   Type of Assistance Needed Independent   Physical Assistance Level No physical assistance   Roll Left and Right CARE Score 6   Sit to Lying   Type of Assistance Needed Independent   Physical Assistance Level No physical assistance   Sit to Lying CARE Score 6   Lying to Sitting on Side of Bed   Type of Assistance Needed Independent   Physical Assistance Level No physical assistance   Lying to Sitting on Side of Bed CARE Score 6   Sit to Stand   Comment pt refused   Bed-Chair Transfer   Type of Assistance Needed Supervision   Physical Assistance Level No physical assistance   Comment sit pivot transfer bed>w/c>bed   Chair/Bed-to-Chair Transfer CARE Score 4   Transfer Bed/Chair/Wheelchair   Limitations Noted In Balance; Endurance;Pain Management;Problem Solving;UE Strength;LE Strength   Adaptive Equipment None   Sit Pivot Supervision   Supine to Sit Independent   Sit to Supine Independent   Wheelchair mobility   Does the patient use a wheelchair? 1  Yes   Type of Wheelchair Used 1   Manual   Method Right upper extremity; Left upper extremity   Assistance Provided For Remove Leg Rest;Replace Leg Rest;Remove armrests;Replace armrests   Distance Level Surface (feet) 45 ft  (45 ftx2)   Distance Wheeled 3% Grade 20 ft   Findings frequent rest breaks required   Therapeutic Interventions   Strengthening supine and seated  LE TE with 2# on LLE   Balance transfer training   Other bed mobility tasks and w/c mobility   Assessment   Treatment Assessment Pt seen for PT treatment session this date with interventions consisting of Therapeutic exercise consisting of: AROM 2 sets of 15 reps B LE in sitting and supine position and therapeutic activity consisting of training: bed mobility, supine<>sit transfers, sit<>stand transfers and sit pivot transfer bed>w/c>bed  Pt agreeable to PT treatment session upon arrival, pt found supine in bed w/ HOB elevated, in no apparent distress and responsive  In comparison to previous session, pt with no improvements as evidenced by pt refused functional mobility, self limiting  Post session: pt returned BTB, all needs in reach and RN notified of session findings/recommendations  Continue to recommend skilled PT services in order to maximize pt's functional independence and safety w/ mobility  Pt continues to be functioning below baseline level  PT will continue to see pt during current hospitalization in order to address the deficits listed above and provide interventions consistent w/ POC in effort to achieve STGs  PT Barriers   Physical Impairment Decreased strength;Decreased range of motion;Decreased endurance; Impaired balance;Decreased mobility;Orthopedic restrictions;Pain   Functional Limitation Car transfers; Ramp negotiation;Stair negotiation;Standing;Transfers; Walking   Plan   Treatment/Interventions ADL retraining;Functional transfer training; Therapeutic exercise; Endurance training;Bed mobility; Compensatory technique education   PT Therapy Minutes   PT Time In 1000   PT Time Out 1100   PT Total Time (minutes) 60   PT Mode of treatment - Individual (minutes) 60   PT Mode of treatment - Concurrent (minutes) 0   PT Mode of treatment - Group (minutes) 0   PT Mode of treatment - Co-treat (minutes) 0   PT Mode of Treatment - Total time(minutes) 60 minutes   PT Cumulative Minutes 977   Therapy Time missed   Time missed?  No

## 2023-05-25 NOTE — PROGRESS NOTES
05/25/23 0800   Pain Assessment   Pain Assessment Tool 0-10   Pain Score 4   Pain Location/Orientation Orientation: Right;Location: Leg   Patient's Stated Pain Goal No pain   Hospital Pain Intervention(s) Medication (See MAR); Cold applied   Restrictions/Precautions   Precautions Bed/chair alarms; Fall Risk;Pain   Weight Bearing Restrictions Yes   RLE Weight Bearing Per Order NWB   Oral Hygiene   Type of Assistance Needed Set-up / clean-up   Physical Assistance Level No physical assistance   Comment sat on EOB   Oral Hygiene CARE Score 5   Shower/Bathe Self   Reason if not Attempted Refused to perform   Shower/Bathe Self CARE Score 7   Upper Body Dressing   Reason if not Attempted Refused to perform   Upper Body Dressing CARE Score 7   Lower Body Dressing   Reason if not Attempted Refused to perform   Lower Body Dressing CARE Score 7   Putting On/Taking Off Footwear   Reason if not Attempted Refused to perform   Putting On/Taking Off Footwear CARE Score 7   Sit to Stand   Type of Assistance Needed Supervision   Physical Assistance Level No physical assistance   Comment stood briefly to pull pants down from buttocks while maintaining NWB on RLE   Sit to Stand CARE Score 4   Toileting   Able to [address] down no, up no  Manage Hygiene Bowel   Limitations Noted In Balance;ROM;LE Strength; Safety   Adaptive Equipment Grab Bar   Toilet Transfer   Type of Assistance Needed Supervision   Physical Assistance Level No physical assistance   Toilet Transfer CARE Score 4   Toilet Transfer   Surface Assessed Standard Toilet   Transfer Technique Stand Pivot   Limitations Noted In Balance; Endurance;ROM;Safety;LE Strength   Adaptive Equipment Grab Bar   Findings Pt  performed SPT on/off of toilet  Dependent for pulling clothing up/down   Transfers   Sit to Stand 5  Supervision   Additional items Bedrails;Armrests; Increased time required;Verbal cues; Other  (grab bars)   Stand to Sit 5  Supervision   Additional items Bedrails;Armrests; Increased time required;Verbal cues; Other  (grab bars)   Stand pivot 5  Supervision   Additional items Other  (grab bars)   Sit pivot 6  Modified independent   Additional items   (grab bars)   Toilet transfer 5  Supervision   Additional items Standard toilet  (grab bars)   Therapeutic Excerise-Strength   UE Strength Yes   Right Upper Extremity- Strength   R Shoulder Flexion; Extension;Horizontal ABduction  (horizontal ADD, pro/retraction)   R Elbow Elbow flexion;Elbow extension   R Wrist Wrist flexion;Wrist extension  (sup/pronation)   R Position Seated   R Weight/Reps/Sets 3# wt  15 reps X 2 sets   Left Upper Extremity-Strength   L Weights/Reps/Sets same as RUE   Exercise Tools   Exercise Tools Yes   Other Exercise Tool 1 4# dowel in shlder flexion/extension, pro/retraction, horizontal ADD/ABD, elbow flexion/extension  1 set X 15 reps  Other Exercise Tool 2 Red theraflex 2 sets X 15 reps  Activity Tolerance   Activity Tolerance Other (Comment)  (Pt  s/w self limiting)   Assessment   Treatment Assessment Patient participated in Skilled OT session this date with interventions consisting of ADL re training with the use of correct body mechnaics, safety awareness and fall prevention techniques, maintaining weight bearing restrictions, therapeutic exercise to: increase functional use of BUEs, increase BUE muscle strength , and  therapeutic activities to: increase activity tolerance   Patient agreeable to OT treatment session, upon arrival patient was found supine in bed  Patient requiring verbal cues for correct technique, frequent rest periods, and ocassional safety reminders  Patient continues to be functioning below baseline level, occupational performance remains limited secondary to factors listed above and increased risk for falls and injury  The patient's raw score on the AM-PAC Daily Activity Inpatient Short Form is     A raw score of greater than or equal to 19 suggests the patient may benefit from discharge to home  Please refer to the recommendation of the Occupational Therapist for safe discharge planning  From OT standpoint, recommendation at time of d/c would be Home with home health rehabilitation  Prognosis Good   Problem List Decreased strength;Decreased range of motion;Decreased endurance; Impaired balance;Decreased safety awareness;Decreased skin integrity;Orthopedic restrictions;Pain   Plan   Treatment/Interventions ADL retraining;Functional transfer training; Therapeutic exercise; Endurance training;OT   Progress Progressing toward goals   Recommendation   OT Discharge Recommendation Home with home health rehabilitation   Additional Comments  Pt  agreeable to OT treatment for UE TE program   Pt  declined bathing/dressing training on this date  OT Therapy Minutes   OT Time In 0840   OT Time Out 0945   OT Total Time (minutes) 65   OT Mode of treatment - Concurrent (minutes) 65   Therapy Time missed   Time missed? No   Column entered at incorrect time  Time should be 8:40      CHRISTEN Guerrero/VINI

## 2023-05-26 LAB
GLUCOSE SERPL-MCNC: 176 MG/DL (ref 65–140)
GLUCOSE SERPL-MCNC: 181 MG/DL (ref 65–140)
GLUCOSE SERPL-MCNC: 184 MG/DL (ref 65–140)
GLUCOSE SERPL-MCNC: 83 MG/DL (ref 65–140)

## 2023-05-26 PROCEDURE — 97530 THERAPEUTIC ACTIVITIES: CPT

## 2023-05-26 PROCEDURE — 97535 SELF CARE MNGMENT TRAINING: CPT

## 2023-05-26 PROCEDURE — 97110 THERAPEUTIC EXERCISES: CPT

## 2023-05-26 PROCEDURE — 97530 THERAPEUTIC ACTIVITIES: CPT | Performed by: PHYSICAL THERAPIST

## 2023-05-26 PROCEDURE — 99232 SBSQ HOSP IP/OBS MODERATE 35: CPT | Performed by: PHYSICAL MEDICINE & REHABILITATION

## 2023-05-26 PROCEDURE — 97542 WHEELCHAIR MNGMENT TRAINING: CPT | Performed by: PHYSICAL THERAPIST

## 2023-05-26 PROCEDURE — 82948 REAGENT STRIP/BLOOD GLUCOSE: CPT

## 2023-05-26 PROCEDURE — 97110 THERAPEUTIC EXERCISES: CPT | Performed by: PHYSICAL THERAPIST

## 2023-05-26 RX ORDER — POLYETHYLENE GLYCOL 3350 17 G/17G
17 POWDER, FOR SOLUTION ORAL DAILY PRN
Status: DISCONTINUED | OUTPATIENT
Start: 2023-05-26 | End: 2023-05-30 | Stop reason: HOSPADM

## 2023-05-26 RX ADMIN — Medication 250 MG: at 17:03

## 2023-05-26 RX ADMIN — CEFAZOLIN SODIUM 2000 MG: 2 SOLUTION INTRAVENOUS at 20:00

## 2023-05-26 RX ADMIN — SENNOSIDES AND DOCUSATE SODIUM 1 TABLET: 50; 8.6 TABLET ORAL at 08:26

## 2023-05-26 RX ADMIN — RIVAROXABAN 10 MG: 10 TABLET, FILM COATED ORAL at 08:26

## 2023-05-26 RX ADMIN — CEFAZOLIN SODIUM 2000 MG: 2 SOLUTION INTRAVENOUS at 05:30

## 2023-05-26 RX ADMIN — FERROUS SULFATE TAB 325 MG (65 MG ELEMENTAL FE) 325 MG: 325 (65 FE) TAB at 21:10

## 2023-05-26 RX ADMIN — LEVOTHYROXINE SODIUM 175 MCG: 75 TABLET ORAL at 05:29

## 2023-05-26 RX ADMIN — ACETAMINOPHEN 650 MG: 325 TABLET ORAL at 21:14

## 2023-05-26 RX ADMIN — INSULIN LISPRO 20 UNITS: 100 INJECTION, SOLUTION INTRAVENOUS; SUBCUTANEOUS at 08:26

## 2023-05-26 RX ADMIN — INSULIN LISPRO 2 UNITS: 100 INJECTION, SOLUTION INTRAVENOUS; SUBCUTANEOUS at 08:26

## 2023-05-26 RX ADMIN — VERAPAMIL HYDROCHLORIDE 360 MG: 180 TABLET ORAL at 21:10

## 2023-05-26 RX ADMIN — ZOLPIDEM TARTRATE 10 MG: 5 TABLET ORAL at 21:14

## 2023-05-26 RX ADMIN — CEFAZOLIN SODIUM 2000 MG: 2 SOLUTION INTRAVENOUS at 14:08

## 2023-05-26 RX ADMIN — PANTOPRAZOLE SODIUM 40 MG: 40 TABLET, DELAYED RELEASE ORAL at 05:29

## 2023-05-26 RX ADMIN — TAMSULOSIN HYDROCHLORIDE 0.4 MG: 0.4 CAPSULE ORAL at 17:03

## 2023-05-26 RX ADMIN — INSULIN GLARGINE 25 UNITS: 100 INJECTION, SOLUTION SUBCUTANEOUS at 21:10

## 2023-05-26 RX ADMIN — INSULIN LISPRO 2 UNITS: 100 INJECTION, SOLUTION INTRAVENOUS; SUBCUTANEOUS at 21:10

## 2023-05-26 RX ADMIN — INSULIN LISPRO 2 UNITS: 100 INJECTION, SOLUTION INTRAVENOUS; SUBCUTANEOUS at 12:22

## 2023-05-26 RX ADMIN — Medication 3 MG: at 21:10

## 2023-05-26 RX ADMIN — INSULIN LISPRO 20 UNITS: 100 INJECTION, SOLUTION INTRAVENOUS; SUBCUTANEOUS at 12:23

## 2023-05-26 RX ADMIN — ATORVASTATIN CALCIUM 40 MG: 40 TABLET, FILM COATED ORAL at 08:26

## 2023-05-26 RX ADMIN — Medication 250 MG: at 08:26

## 2023-05-26 RX ADMIN — ESCITALOPRAM OXALATE 5 MG: 5 TABLET, FILM COATED ORAL at 08:26

## 2023-05-26 NOTE — PROGRESS NOTES
PM&R PROGRESS NOTE:  Lisa Jose 59 y o  male MRN: [id number]  Unit/Bed#: -01 Encounter: [id number]        Rehabilitation Diagnosis: Impairment of mobility, safety and Activities of Daily Living (ADLs) due to Orthopedic Disorders:  08 9  Other Orthopedic Closed fracture of medial plataeu of right tibia    HPI: Lisa Jose is a 59 y o  male with a PMH significant for CKD, Diabetes mellitus, GERD, HLD and HTN who presented to the [address] [place] [address] on 05/01 for surgical management of a previous tibial plateau fracture  Patient seen in April for the same and opted out of surgical intervention at that time  Now, presented with varus malalignment, instability of the joint and significant comminution and bone void  He was taken to the OR with Dr Jeff Keita on 05/01 and is s/p ORIF to the right tibial plateau  Ordered NWB status post-op for approximately 10-12 weeks with immobilizer in place for 2 weeks post-op  Post-op patient developed urinary retention  Urology consulted  Patient refused straight cath and was following urinary retention protocol to only get casey catheter insertion if needed  Nephrology consulted with CKD history, started on IV fluid resuscitation  Developed hyponatremia  Home HCTZ was held and IVF were stopped  Etiology determined to be SIADH secondary to HCTZ  Once HCTZ was stopped, sodium started to improve  Patient evaluated by PT/OT and deemed appropriate for post acute rehabilitation services  He was accepted to [facility] and arrived on 5/10/23  SUBJECTIVE: Patient seen and evaluated  Offers no complaints at this time  RN and patient both reported no drainage today to RLE  Informed patient discharge plan is for Tuesday  If no drainage continues, will decrease dressing changes to daily then eventually open to air      ASSESSMENT: Stable, progressing      PLAN:    Rehabilitation  • Functional deficits: impaired mobility, self care, NWB RLE, pain RLE, serosanguinous drainage     • Continue current rehabilitation plan of care to maximize function  • Functional update:   o PT:  Roll L-R: independent   Sit-lying: independent   Lying to sitting on side of bed: independent   Sit-stand: min   Bed-chair transfer: supervision    Ambulation: safety concerns   Wheelchair mobility: independent   o OT:  Oral Hygiene: giraldo cu   Toilet transfer: supervision      • Estimated Discharge: 5/30/23 with Sudheer Schaefer PT/OT/N      Pain  • Tylenol 650 mg Q8H PRN for mild pain  • Robaxin 500 mg Q6H PRN for muscle spasms     DVT prophylaxis  • Lovenox sq inj 40 mg daily     Bladder plan  • Continent     Bowel plan  • Continent   • Last BM 5/25/23  · Continue Senna daily HS, Miralax daily PRN, Ducolax tablets 5 mg daily PRN      Skin care  · Monitor skin daily  · Apply skin nourishing cream  · Reposition Q2H to offload pressure  · Elevate heels off bed  · Monitor incision site  · RLE : cleanse leg with soap/water, change dressing with soilage/dislodgement and after showers  BID changes for now, will transition back to daily today if no further episodes of drainage   · Left anterior shin/left plantar foot: cleanse wound beds with NSS, pat dry  Apply maxorb ag to plantar foor wound and cover with bordered gauze  Place dermagran on the left shin wound, cover with bordered gauze   Change dressings daily and PRN     Stage 3b chronic kidney disease [facility])  Assessment & Plan  Lab Results   Component Value Date    CREATININE 0 68 05/22/2023    CREATININE 0 77 05/17/2023    CREATININE 0 70 05/15/2023    EGFR 103 05/22/2023    EGFR 98 05/17/2023    EGFR 102 05/15/2023   · Monitor periodically  · Followed in acute care with Nephrology  · S/P IV Fluids  · HCTZ d/c in setting of hyponatremia     Type 2 diabetes mellitus with diabetic peripheral angiopathy without gangrene [facility])  Assessment & Plan  Lab Results   Component Value Date    HGBA1C 7 9 (H) 04/24/2023       Recent Labs     05/25/23  1116 05/25/23  1616 05/25/23 2034 05/26/23  0704   POCGLU 217* 109 74 184*       Blood Sugar Average: Last 72 hrs:  (P) 141   · Monitor accu cheks and adjust regimen as needed   · Continue Lantus 25 units daily HS, 20 units humalog TID AC, SSI  · Home: Lantus 30 units daily HS, Humalog 20 units TID AC  · Carb controlled diet     Acquired hypothyroidism  Assessment & Plan  · Levothyroxine 150 mcg daily increased to 175 mcg in setting of elevated TSH  · 11/14- 35 400, 5/15- 26 562  · Will need repeat TSH at next PCP follow up      Insomnia  Assessment & Plan  · Continue Ambien 10 mg HS PRN and Melatonin     PAD (peripheral artery disease) (HCC)  Assessment & Plan  · Home Plavix 75 mg daily currently on hold with Xarelto added for DVT ppx  · May resume once Xarelto is completed  Hyponatremia  Assessment & Plan  · Seen by Nephrology in acute care  · Etiology determined to be SIADH secondary to HCTZ  · HCTZ since stopped   · 134 5/22- repeat Monday    Hyperlipidemia  Assessment & Plan  · Continue Lipitor 40 mg     Gastroesophageal reflux disease without esophagitis  Assessment & Plan  · Continue Protonix 40 mg daily  · Home: Prilosec 40 mg daily    Hypertension, essential  Assessment & Plan  · Monitor vitals  · Continue Verapamil 360 mg daily HS    * Closed fracture of medial plateau of right tibia  Assessment & Plan  · Complex history   · Arrived 5/1 for surgical intervention of ongoing right tibial plateau fracture  · Previously in SNF but found to have varus malalignment, instability of the joint, significant comminution and bone void  · S/p ORIF on 05/01 with Dr Nikki Gonzalez  · NWB for 10-12 weeks  · Followed up with Dr Nikki Gonzalez on 5/16/23  · For DVT ppx x6 weeks since post-op- Lovenox discontinued per patient's request  [facility] initiated  · Continue Xarelto for remainder of 6 weeks  · Price checked at $50 for 30-day supply    · Home aspirin and Plavix held while on Xarelto, may resume once Xarelto is completed   · Noted to have "Cellulitis levi-incision site at Ortho follow up appointment  · Continue IV Ancef day 10/14  · Continue Probiotic BID while on antibiotic  · Gentle ROM of the knee, ok for AROM, hold PROM at this time   · Follow up in 3 weeks with ortho for repeat XR  · Sutures will not be removed until patient follows up with Ortho as outpatient  · Repeat XR 5/21: improved alignment of proximal tibial fractures s/p ORIF  Intact hardware   · Acute chomprehensive interdisciplinary inpatient rehabilitation to include intensive skilled therapies as outlines with oversight and management by a rehabilitation Physician Assistant overseen by rehabilitation physician as well as inpatient rehabilitation nursing, case management and weekly interdisciplinary team meeting            Appreciate  consultants medical co-management  Labs, medications, and imaging personally reviewed  ROS:  A ten point review of systems was completed and pertinent positives are listed in subjective section  All other systems reviewed were negative  Review of Systems   Constitutional: Negative  HENT: Negative  Respiratory: Negative  Negative for shortness of breath  Cardiovascular: Negative  Negative for chest pain  Gastrointestinal: Negative  Negative for abdominal pain and constipation  Genitourinary: Negative  Negative for difficulty urinating  Musculoskeletal: Negative  Negative for arthralgias and myalgias  Neurological: Negative  Psychiatric/Behavioral: Negative  OBJECTIVE:   /64 (BP Location: Right arm)   Pulse 74   Temp 98 2 °F (36 8 °C) (Temporal)   Resp 16   Ht 6' 4\" (1 93 m)   Wt 123 kg (270 lb 8 1 oz)   SpO2 98%   BMI 32 93 kg/m²     Physical Exam  Vitals reviewed  Constitutional:       General: He is not in acute distress  Appearance: He is not ill-appearing  HENT:      Head: Normocephalic and atraumatic  Eyes:      Pupils: Pupils are equal, round, and reactive to light     Cardiovascular: " Rate and Rhythm: Normal rate and regular rhythm  Pulses: Normal pulses  Heart sounds: Normal heart sounds  No murmur heard  Pulmonary:      Effort: Pulmonary effort is normal  No respiratory distress  Breath sounds: Normal breath sounds  Abdominal:      General: Bowel sounds are normal  There is no distension  Palpations: Abdomen is soft  Skin:     General: Skin is warm and dry  Comments: Dressings intact   No drainage noted on dressings  For shower today will evaluate incision site later in the day  Erythema has been improving and no reports of drainage so far today   Neurological:      General: No focal deficit present  Mental Status: He is alert and oriented to person, place, and time     Psychiatric:         Mood and Affect: Mood normal          Behavior: Behavior normal           Lab Results   Component Value Date    HCT 26 5 (L) 05/22/2023    HGB 8 3 (L) 05/22/2023    MCV 95 05/22/2023     (H) 05/22/2023    WBC 7 71 05/22/2023     Lab Results   Component Value Date    BUN 16 05/22/2023    CALCIUM 8 2 (L) 05/22/2023     05/22/2023    CO2 26 05/22/2023    CREATININE 0 68 05/22/2023    GLUC 168 (H) 05/22/2023    K 3 5 05/22/2023    SODIUM 134 (L) 05/22/2023     Lab Results   Component Value Date    INR 1 04 08/04/2020    INR 0 99 11/15/2018    PROTIME 13 8 08/04/2020    PROTIME 13 0 11/15/2018           Current Facility-Administered Medications:   •  acetaminophen (TYLENOL) tablet 650 mg, 650 mg, Oral, Q8H PRN, Pili Carry, PA-C, 650 mg at 05/25/23 2121  •  atorvastatin (LIPITOR) tablet 40 mg, 40 mg, Oral, QAM, Pili Carry, PA-C, 40 mg at 05/26/23 9038  •  bisacodyl (DULCOLAX) EC tablet 5 mg, 5 mg, Oral, Daily PRN, Pili Carry, PA-C  •  ceFAZolin (ANCEF) IVPB (premix in dextrose) 2,000 mg 50 mL, 2,000 mg, Intravenous, Q8H, Pili Carry, PA-C, Last Rate: 100 mL/hr at 05/26/23 0530, 2,000 mg at 05/26/23 0530  •  escitalopram (LEXAPRO) tablet 5 mg, 5 mg, Oral, Daily, Fredis Heladio, PA-C, 5 mg at 05/26/23 7304  •  ferrous sulfate tablet 325 mg, 325 mg, Oral, HS, Fredis Heladio, PA-C, 325 mg at 05/24/23 2112  •  insulin glargine (LANTUS) subcutaneous injection 25 Units 0 25 mL, 25 Units, Subcutaneous, HS, Fredis Heladio, PA-C, 25 Units at 05/25/23 2121  •  insulin lispro (HumaLOG) 100 units/mL subcutaneous injection 2-12 Units, 2-12 Units, Subcutaneous, 4x Daily (AC & HS), 2 Units at 05/26/23 0826 **AND** Fingerstick Glucose (POCT), , , 4x Daily AC and at bedtime, Fredis Heladio, PA-C  •  insulin lispro (HumaLOG) 100 units/mL subcutaneous injection 20 Units, 20 Units, Subcutaneous, TID With Meals, Fredis Heladio, PA-C, 20 Units at 05/26/23 1952  •  levothyroxine tablet 175 mcg, 175 mcg, Oral, Early Morning, Fredis Heladio, PA-C, 175 mcg at 05/26/23 9265  •  melatonin tablet 3 mg, 3 mg, Oral, HS, Eldon Wolfe MD, 3 mg at 05/25/23 2121  •  methocarbamol (ROBAXIN) tablet 500 mg, 500 mg, Oral, Q6H PRN, Fredis Heladio, PA-C  •  ondansetron (ZOFRAN-ODT) dispersible tablet 4 mg, 4 mg, Oral, Q6H PRN, Fredis Heladio, PA-C  •  pantoprazole (PROTONIX) EC tablet 40 mg, 40 mg, Oral, Early Morning, Fredis Heladio, PA-C, 40 mg at 05/26/23 3063  •  polyethylene glycol (MIRALAX) packet 17 g, 17 g, Oral, Daily PRN, Eldon Wolfe MD  •  rivaroxaban (XARELTO) tablet 10 mg, 10 mg, Oral, Daily With Breakfast, Fredis Heladio, PA-C, 10 mg at 05/26/23 7021  •  saccharomyces boulardii (FLORASTOR) capsule 250 mg, 250 mg, Oral, BID, Fredis Heladio, PA-C, 250 mg at 05/26/23 4211  •  senna-docusate sodium (SENOKOT S) 8 6-50 mg per tablet 1 tablet, 1 tablet, Oral, Daily, Fredis Heladio, PA-C, 1 tablet at 05/26/23 3962  •  tamsulosin (FLOMAX) capsule 0 4 mg, 0 4 mg, Oral, Daily With Dinner, Fredis Heladio, PA-C, 0 4 mg at 05/25/23 1720  •  verapamil (CALAN-SR) CR tablet 360 mg, 360 mg, Oral, HS, Fredis Heladio, PA-C, 360 mg at 05/25/23 2121  •  zolpidem (AMBIEN) tablet 10 mg, 10 mg, Oral, HS PRN, Jayashree Nick GABRIEL Guerrier, 10 mg at 05/25/23 2121    Past Medical History:   Diagnosis Date   • Broken internal right knee prosthesis ([facility] ) 01/2023   • Chronic kidney disease    • Colon polyp    • Constipation 03/09/2023   • Diabetes mellitus ([facility] )    • Disease of thyroid gland    • GERD (gastroesophageal reflux disease)    • HHS vs DKA 11/16/2018   • Hyperlipidemia    • Hypertension    • Thyroid cancer [facility])        Patient Active Problem List    Diagnosis Date Noted   • MANNY (obstructive sleep apnea) 05/01/2023   • Closed fracture of medial plateau of right tibia 04/13/2023   • Depression and anxiety 03/03/2023   • Acquired genu varum of right lower extremity 02/21/2023   • MARKELL (acute kidney injury) ([place] [address] ) 02/14/2023   • Chronic kidney disease-mineral and bone disorder 02/14/2023   • Closed fracture of right tibial plateau 35/01/9518   • Stage 3b chronic kidney disease ([place] [address] ) 11/15/2022   • Hyperkalemia 11/15/2022   • Alcoholism ([place] [address] ) 11/15/2022   • Dupuytren's contracture of right hand 06/07/2022   • Type 2 diabetes mellitus with diabetic peripheral angiopathy without gangrene ([place] [address] ) 02/07/2022   • Physical deconditioning 12/21/2021   • Acquired hypothyroidism 10/18/2021   • Acquired absence of right foot ([place] [address] ) 03/29/2021   • Persistent proteinuria 01/12/2021   • Abnormal EKG 12/03/2020   • Preoperative examination 12/03/2020   • Insomnia 12/03/2020   • Acute blood loss anemia 10/08/2020   • Hypomagnesemia 09/09/2020   • Urinary retention 09/08/2020   • Thyroid cancer ([facility] ) 08/20/2020   • PAD (peripheral artery disease) ([place] [address] ) 08/05/2020   • Hyponatremia 08/04/2020   • Health maintenance examination 07/06/2020   • Elevated liver enzymes 10/24/2019   • Colon polyps 09/12/2019   • Right-sided tinnitus 11/26/2018   • Hypertension, essential 09/24/2018   • Gastroesophageal reflux disease without esophagitis 09/24/2018   • Obesity, Class I, BMI 30 0-34 9 (see actual BMI) 03/25/2010   • Hyperlipidemia 12/17/2009   • History of nonadherence to medical treatment 02/20/2008          Evans Monreal PA-C    I have spent a total time of 35 minutes on 05/26/23 in caring for this patient including Instructions for management, Patient and family education, Impressions, Counseling / Coordination of care, Documenting in the medical record, Reviewing / ordering tests, medicine, procedures  , Obtaining or reviewing history   and Communicating with other healthcare professionals   ** Please Note:  voice to text software may have been used in the creation of this document   Although proof errors in transcription or interpretation are a potential of such software**

## 2023-05-26 NOTE — DISCHARGE INSTRUCTIONS
ORIF of a Leg Fracture   WHAT YOU NEED TO KNOW:   Open reduction and internal fixation (ORIF) is surgery to fix a fractured (broken) bone in your leg  Orthopedic hardware (screws or plates) is used to hold the broken bone together while it heals  DISCHARGE INSTRUCTIONS:   Call your local emergency number (911 in the [address] [facility]) if:   You suddenly feel lightheaded and short of breath  You have chest pain when you take a deep breath or cough  You may cough up blood  Seek care immediately if:   Blood soaks through your bandage  Your toes look pale or blue, feel numb, or tingle  Your leg feels warm, tender, and painful  It may look swollen and red  Call your doctor or surgeon if:   Your cast breaks  Your cast gets wet or begins to smell  Your bandage or cast feels tighter and you think your toes are more swollen  Your cast feels loose  You have a lot of itching under your cast     You have a fever  You have questions or concerns about your condition or care  Medicines: You may need any of the following:  Prescription pain medicine  may be given  Ask your healthcare provider how to take this medicine safely  Some prescription pain medicines contain acetaminophen  Do not take other medicines that contain acetaminophen without talking to your healthcare provider  Too much acetaminophen may cause liver damage  Prescription pain medicine may cause constipation  Ask your healthcare provider how to prevent or treat constipation  Blood thinners  help prevent blood clots  Clots can cause strokes, heart attacks, and death  The following are general safety guidelines to follow while you are taking a blood thinner:    Watch for bleeding and bruising while you take blood thinners  Watch for bleeding from your gums or nose  Watch for blood in your urine and bowel movements  Use a soft washcloth on your skin, and a soft toothbrush to brush your teeth  This can keep your skin and gums from bleeding  If you shave, use an electric shaver  Do not play contact sports  Tell your dentist and other healthcare providers that you take a blood thinner  Wear a bracelet or necklace that says you take this medicine  Do not start or stop any other medicines unless your healthcare provider tells you to  Many medicines cannot be used with blood thinners  Take your blood thinner exactly as prescribed by your healthcare provider  Do not skip does or take less than prescribed  Tell your provider right away if you forget to take your blood thinner, or if you take too much  Warfarin  is a blood thinner that you may need to take  The following are things you should be aware of if you take warfarin:     Foods and medicines can affect the amount of warfarin in your blood  Do not make major changes to your diet while you take warfarin  Warfarin works best when you eat about the same amount of vitamin K every day  Vitamin K is found in green leafy vegetables and certain other foods  Ask for more information about what to eat when you are taking warfarin  You will need to see your healthcare provider for follow-up visits when you are on warfarin  You will need regular blood tests  These tests are used to decide how much medicine you need  Take your medicine as directed  Contact your healthcare provider if you think your medicine is not helping or if you have side effects  Tell your provider if you are allergic to any medicine  Keep a list of the medicines, vitamins, and herbs you take  Include the amounts, and when and why you take them  Bring the list or the pill bottles to follow-up visits  Carry your medicine list with you in case of an emergency  Self-care:   Ask about activity  Rest as needed  Do not lift heavy objects  Ask when you can drive, return to your daily activities, and go back to work  Use support devices  You may need to use crutches, a cane, or a walker   It is important to use your crutches correctly  Ask your healthcare provider for more information about how to use crutches  Ask when you can bathe  When you are allowed to bathe, cover your cast with 2 plastic bags  Tape the bags to your skin above the cast  Keep the cast out of the water so it does not get wet  Elevate your leg  Raise your leg above the level of your heart as often as you can  This will help decrease swelling and pain  Prop your leg on pillows or blankets to keep it elevated comfortably  Apply ice as directed  Ice helps decrease swelling and pain  Ice may also help prevent tissue damage  Use an ice pack or put crushed ice in a plastic bag  Cover it with a towel and place it on your leg for 15 to 20 minutes every hour as directed  Go to physical therapy if directed:  A physical therapist teaches you exercises to help improve movement and strength, and to decrease pain  Follow up with your doctor or surgeon as directed: You may need to return to have your wound checked and staples or stitches removed  You may also need to have blood tests  Write down your questions so you remember to ask them during your visits  © Copyright [employer] 2022 Information is for End User's use only and may not be sold, redistributed or otherwise used for commercial purposes  The above information is an  only  It is not intended as medical advice for individual conditions or treatments  Talk to your doctor, nurse or pharmacist before following any medical regimen to see if it is safe and effective for you

## 2023-05-26 NOTE — PROGRESS NOTES
05/26/23 1030   Pain Assessment   Pain Assessment Tool 0-10   Pain Score 4   Pain Location/Orientation Orientation: Right;Location: Knee; Location: Leg   Restrictions/Precautions   Precautions Bed/chair alarms; Fall Risk;Pain;Limb alert  (WBS)   RLE Weight Bearing Per Order NWB   Braces or Orthoses   (ace wrap RLE)   Oral Hygiene   Type of Assistance Needed Independent   Oral Hygiene CARE Score 6   Grooming   Able To Initiate Tasks;Comb/Brush Hair;Wash/Dry Face;Brush/Clean Teeth;Wash/Dry Hands   Findings independent w/c level at the sink   Shower/Bathe Self   Type of Assistance Needed Physical assistance   Physical Assistance Level 25% or less   Shower/Bathe Self CARE Score 3   Bathing   Assessed Bath Style Shower   Anticipated D/C Bath Style Shower;Sponge Bath   Able to [place] Bam No   Able to [employer] Temperature No   Able to Wash/Rinse/Dry (body part) Right Arm;Left Arm;L Upper Leg;R Upper Leg;Chest;Abdomen;Perineal Area; Buttocks   Limitations Noted in Balance; Endurance;Problem Solving;ROM;Safety;Strength  (NWB RLE)   Positioning Seated   Adaptive Equipment Longhand Sponge;Longhand Reacher;Tub Bench; Shower [place] notified of drainage from incision when dressing removed for shower   Tub/Shower Transfer   Limitations Noted In Balance; Endurance;Problem Solving;ROM;Safety;LE Strength  (NWB RLE)   Adaptive Equipment Grab Bars;Transfer Bench   Assessed Shower   Findings CGA standing to transfer with grabbar   Upper Body Dressing   Type of Assistance Needed Set-up / clean-up   Upper Body Dressing CARE Score 5   Lower Body Dressing   Type of Assistance Needed Supervision   Lower Body Dressing CARE Score 4   Putting On/Taking Off Footwear   Type of Assistance Needed Supervision   Comment pt able to cross LE over opposite to doff and sis socks   Putting On/Taking Off Footwear CARE Score 4   Dressing/Undressing Clothing   Remove UB Clothes Pullover [address] Shirt   Remove LB Clothes Shorts;Socks   Don LB Clothes Shorts;Socks   Limitations Noted In Balance; Endurance;Problem Solving; Safety;Strength;ROM  (NWB RLE)   Adaptive Equipment Reacher   Positioning Supported Sit;Standing   Sit to Lying   Type of Assistance Needed Independent   Sit to Lying CARE Score 6   Lying to Sitting on Side of Bed   Type of Assistance Needed Independent   Lying to Sitting on Side of Bed CARE Score 6   Sit to Stand   Type of Assistance Needed Supervision   Sit to Stand CARE Score 4   Bed-Chair Transfer   Type of Assistance Needed Supervision   Chair/Bed-to-Chair Transfer CARE Score 4   Toileting Hygiene   Type of Assistance Needed Physical assistance   Physical Assistance Level 51%-75%   Toileting Hygiene CARE Score 2   Toileting   Able to [address] down no, up no  Manage Hygiene Bladder   Limitations Noted In Balance;Problem Solving; Safety;LE Strength;ROM  (NWB RLE)   Adaptive Equipment Grab Bar   Toilet Transfer   Type of Assistance Needed Supervision   Toilet Transfer CARE Score 4   Toilet Transfer   Surface Assessed Raised Toilet   Transfer Technique Stand Pivot   Limitations Noted In Balance; Endurance;Problem Solving;ROM;Safety;LE Strength   Adaptive Equipment Grab Bar   Exercise Tools   Other Exercise Tool 1 Sanderbox 2 sets 15 all directions with BUE and 2# wt   Other Exercise Tool 2 yellow flex therabar 2 sets 15 up and down BUE   Other Exercise Tool 3 5# digiflex 2 sets 15 B hands   Cognition   Orientation Level Oriented X4   Additional Activities   Additional Activities Other (Comment)   Additional Activities Comments w/c mobility in room and hallway Mod I using BUE   Other Comments   Assessment Pt participates in 38 minutes concurrent treatment focusing on BUE therex/ theract for UB strength/ activity with similar goals as another patient   Assessment   Treatment Assessment Pt presents supine agreeable tot otileting, BUE therex, related transfers/ mobility and ADLs   Pt requires assist due to decreased balance, safety, endurance, strength/ ROM with NWB RLE  Pt is making gains with FT planned later this afternooon  Problem List Decreased strength;Decreased range of motion;Decreased endurance; Impaired balance;Decreased safety awareness;Decreased skin integrity;Pain;Orthopedic restrictions  (NWB RLE)   Plan   Treatment/Interventions ADL retraining;Functional transfer training; Therapeutic exercise; Endurance training;Patient/family training;Equipment eval/education; Compensatory technique education   Progress Progressing toward goals   OT Therapy Minutes   OT Time In 1030   OT Time Out 1200   OT Total Time (minutes) 90   OT Mode of treatment - Individual (minutes) 52   OT Mode of treatment - Concurrent (minutes) 38   Therapy Time missed   Time missed?  No

## 2023-05-26 NOTE — PLAN OF CARE
Problem: PAIN - ADULT  Goal: Verbalizes/displays adequate comfort level or baseline comfort level  Description: Interventions:  - Encourage patient to monitor pain and request assistance  - Assess pain using appropriate pain scale  - Administer analgesics based on type and severity of pain and evaluate response  - Implement non-pharmacological measures as appropriate and evaluate response  - Consider cultural and social influences on pain and pain management  - Notify physician/advanced practitioner if interventions unsuccessful or patient reports new pain  Outcome: Progressing     Problem: INFECTION - ADULT  Goal: Absence or prevention of progression during hospitalization  Description: INTERVENTIONS:  - Assess and monitor for signs and symptoms of infection  - Monitor lab/diagnostic results  - Monitor all insertion sites, i e  indwelling lines, tubes, and drains  - Monitor endotracheal if appropriate and nasal secretions for changes in amount and color  - [place] appropriate cooling/warming therapies per order  - Administer medications as ordered  - Instruct and encourage patient and family to use good hand hygiene technique  - Identify and instruct in appropriate isolation precautions for identified infection/condition  Outcome: Progressing  Goal: Absence of fever/infection during neutropenic period  Description: INTERVENTIONS:  - Monitor WBC    Outcome: Progressing The patient is a 44y Male complaining of numbness.

## 2023-05-26 NOTE — CASE MANAGEMENT
Lm for pt to call back for results  Met with Pt & spouse during FT  DC planning reviewed with Pt & Pt's spouse, who expressed an understanding & agreement  Pt being [address] home on Tuesday afternoon, being transported by family via car, which tx team has determined to be a safe mode of transport  FU appts indicated on DC instructions, to be scheduled by Pt per scheduling preferences  Sudheer Schaefer arranged with Leni (Nsg, PT, OT), per Pt preferences from choice list provided  The Pt's current course of Tx & post DC goals of care have been shared with Post-acute care service providers

## 2023-05-26 NOTE — PROGRESS NOTES
05/26/23 1330   Pain Assessment   Pain Assessment Tool 0-10   Pain Score 4   Restrictions/Precautions   Precautions Bed/chair alarms; Fall Risk;Limb alert;Pain  (WBS)   RLE Weight Bearing Per Order NWB   Braces or Orthoses   (ace wrap)   Tub/Shower Transfer   Findings transfer bench use reviewed with OT demonstrating transfer to bench  BR setup at home discussed with grab bar on outside and inside of tub and bench recommended  Wife and pt concerned that arms do not remove on transport chair, however with patient's improved strength and activity tolerance and use of grab bar and familiar bench pt should not hav eincreased difficulty  Recommend HHOT review transfer prio to competion of shower when returned to home   Dressing/Undressing Clothing   Findings bathing dressing reviewed verbally with wife   Bed-Chair Transfer   Type of Assistance Needed Supervision   Chair/Bed-to-Chair Transfer CARE Score 4   Exercise Tools   Other Exercise Tool 1 HEP reviewed for BUE therex with program completed several times prior to this session  Recommend a 3# wt for use at home   Cognition   Orientation Level Oriented X4   Additional Activities   Additional Activities Other (Comment)   Additional Activities Comments w/c mobility in room and hallway Mod I using BUE   Assessment   Treatment Assessment Pt presents in w/c in PT room agreeable to short session including family training focusing on tub transfer with DME review and HEP establishment with 3# wt recommended  Pt tolerates session without complaitns returning to bed for IV treatment at completion  Problem List Decreased strength;Decreased range of motion;Decreased endurance; Impaired balance;Decreased safety awareness;Decreased skin integrity;Orthopedic restrictions;Pain  (NWB RLE)   Plan   Treatment/Interventions ADL retraining;Functional transfer training; Endurance training; Therapeutic exercise;Patient/family training;Equipment eval/education; Compensatory technique education   Progress Progressing toward goals   OT Therapy Minutes   OT Time In 1330   OT Time Out 1400   OT Total Time (minutes) 30   OT Mode of treatment - Individual (minutes) 30   Addendum: Pt's wife in during this session for family training focusing on ADL related transfers and est of HEP  No other concerns expressed during session

## 2023-05-26 NOTE — NUTRITION
23 1139   Biochemical Data,Medical Tests, and Procedures   Biochemical Data/Medical Tests/Procedures Lab values reviewed; Meds reviewed   Labs (Comment) BG elevated at times  Hypoglycemia  B @ 1631   Meds (Comment) lipitor, ancef, lexapro, FeSO4, latnus, humalog, levothyroxine, melatonin, zofran, protonix, florastor, ambien   Nutrition-Focused Physical Exam   Nutrition-Focused Physical Exam Findings RN skin assessment reviewed  (Diabetic ulcer left foot, wound left pretibial, wound right pretibial, wound righh tleg, wound left pretibial per documentation)   Nutrition-Focused Physical Exam Findings nonpitting RLE noted, trace LLE edema noted   Medical-Related Concerns PMH reviewed   Adequacy of Intake   Nutrition Modality PO   Feeding Route   PO Independent   Current PO Intake   Current Diet Order CCD 1 diet thin liquids   Current Meal Intake 50-75%;%   Estimated calorie intake compared to estimated need Nutrient needs met  PES Statement   Problem Continue previous diagnosis   Recommendations/Interventions   Malnutrition/BMI Present No  (does not meet criteria)   Summary CCD 1 diet thin liquids  No nutrition supplements  Meal intakes %   270#, BMI=33 93; 4# gain from [place] 5/10 266#  BG reviewed  Nonpitting RLE edema trace LLE edema present  Skin integrity reviewed  He states his appetite is good  He reports ordering out at times  He states he is eating protein rich foods  Recommend CCD 2 diet thin liquids based on calcuated nutrient needs and hypoglycemia  Interventions/Recommendations Adjust diet order   Intervention Comments CCD 2 diet thin liquids   Education Assessment   Education Education not indicated at this time   Patient Nutrition Goals   Goal Glucose WNL; Comprehend education   Goal Status Not met; Extended   Timeframe to complete goal by next f/u   Nutrition Complexity Risk   Nutrition complexity level Low risk   Follow up date 23

## 2023-05-26 NOTE — PLAN OF CARE
Problem: PAIN - ADULT  Goal: Verbalizes/displays adequate comfort level or baseline comfort level  Description: Interventions:  - Encourage patient to monitor pain and request assistance  - Assess pain using appropriate pain scale  - Administer analgesics based on type and severity of pain and evaluate response  - Implement non-pharmacological measures as appropriate and evaluate response  - Consider cultural and social influences on pain and pain management  - Notify physician/advanced practitioner if interventions unsuccessful or patient reports new pain  Outcome: Progressing     Problem: INFECTION - ADULT  Goal: Absence or prevention of progression during hospitalization  Description: INTERVENTIONS:  - Assess and monitor for signs and symptoms of infection  - Monitor lab/diagnostic results  - Monitor all insertion sites, i e  indwelling lines, tubes, and drains  - Monitor endotracheal if appropriate and nasal secretions for changes in amount and color  - [place] appropriate cooling/warming therapies per order  - Administer medications as ordered  - Instruct and encourage patient and family to use good hand hygiene technique  - Identify and instruct in appropriate isolation precautions for identified infection/condition  Outcome: Progressing     Problem: INFECTION - ADULT  Goal: Absence of fever/infection during neutropenic period  Description: INTERVENTIONS:  - Monitor WBC    Outcome: Progressing     Problem: SAFETY ADULT  Goal: Patient will remain free of falls  Description: INTERVENTIONS:  - Educate patient/family on patient safety including physical limitations  - Instruct patient to call for assistance with activity   - Consult OT/PT to assist with strengthening/mobility   - Keep Call bell within reach  - Keep bed low and locked with side rails adjusted as appropriate  - Keep care items and personal belongings within reach  - Initiate and maintain comfort rounds  - Make Fall Risk Sign visible to staff  - Apply yellow socks and bracelet for high fall risk patients  - Consider moving patient to room near nurses station  Outcome: Progressing    Problem: MOBILITY - ADULT  Goal: Maintain or return to baseline ADL function  Description: INTERVENTIONS:  -  Assess patient's ability to carry out ADLs; assess patient's baseline for ADL function and identify physical deficits which impact ability to perform ADLs (bathing, care of mouth/teeth, toileting, grooming, dressing, etc )  - Assess/evaluate cause of self-care deficits   - Assess range of motion  - Assess patient's mobility; develop plan if impaired  - Assess patient's need for assistive devices and provide as appropriate  - Encourage maximum independence but intervene and supervise when necessary  - Involve family in performance of ADLs  - Assess for home care needs following discharge   - Consider OT consult to assist with ADL evaluation and planning for discharge  - Provide patient education as appropriate  Outcome: Progressing       Problem: Nutrition/Hydration-ADULT  Goal: Nutrient/Hydration intake appropriate for improving, restoring or maintaining nutritional needs  Description: Monitor and assess patient's nutrition/hydration status for malnutrition  Collaborate with interdisciplinary team and initiate plan and interventions as ordered  Monitor patient's weight and dietary intake as ordered or per policy  Utilize nutrition screening tool and intervene as necessary  Determine patient's food preferences and provide high-protein, high-caloric foods as appropriate       INTERVENTIONS:  - Monitor oral intake, urinary output, labs, and treatment plans  - Assess nutrition and hydration status and recommend course of action  - Evaluate amount of meals eaten  - Assist patient with eating if necessary   - Allow adequate time for meals  - Recommend/ encourage appropriate diets, oral nutritional supplements, and vitamin/mineral supplements  - Order, calculate, and assess calorie counts as needed  - Recommend, monitor, and adjust tube feedings and TPN/PPN based on assessed needs  - Assess need for intravenous fluids  - Provide specific nutrition/hydration education as appropriate  - Include patient/family/caregiver in decisions related to nutrition  Outcome: Progressing

## 2023-05-26 NOTE — PROGRESS NOTES
05/26/23 1230   Pain Assessment   Pain Assessment Tool 0-10   Pain Score 4   Pain Location/Orientation Orientation: Right;Location: Knee   Restrictions/Precautions   Precautions Bed/chair alarms; Fall Risk;Limb alert  ([address] right LE)   Weight Bearing Restrictions Yes   RLE Weight Bearing Per Order NWB   Cognition   Overall Cognitive Status WFL   Subjective   Subjective Leg with weeping, nsg made aware   Roll Left and Right   Type of Assistance Needed Independent   Physical Assistance Level No physical assistance   Roll Left and Right CARE Score 6   Lying to Sitting on Side of Bed   Type of Assistance Needed Independent   Physical Assistance Level No physical assistance   Lying to Sitting on Side of Bed CARE Score 6   Sit to Stand   Type of Assistance Needed Supervision   Physical Assistance Level No physical assistance   Comment Left ankle splint in place, stand at parallel bars   Sit to Stand CARE Score 4   Bed-Chair Transfer   Type of Assistance Needed Supervision   Physical Assistance Level No physical assistance   Comment Sit pivot transfer   Chair/Bed-to-Chair Transfer CARE Score 4   Car Transfer   Reason if not Attempted Environmental limitations   Car Transfer CARE Score 10   Walk 10 Feet   Reason if not Attempted Safety concerns   Walk 10 Feet CARE Score 88   Walk 50 Feet with Two Turns   Reason if not Attempted Safety concerns   Walk 50 Feet with Two Turns CARE Score 88   Walk 150 Feet   Reason if not Attempted Safety concerns   Walk 150 Feet CARE Score 88   Walking 10 Feet on Uneven Surfaces   Reason if not Attempted Safety concerns   Walking 10 Feet on Uneven Surfaces CARE Score 88   Ambulation   Does the patient walk? 0  No, and walking goal is not clinically indicated     Wheel 50 Feet with Two Turns   Type of Assistance Needed Independent   Physical Assistance Level No physical assistance   Wheel 50 Feet with Two Turns CARE Score 6   Wheel 150 Feet   Type of Assistance Needed Independent   Physical Assistance Level No physical assistance   Wheel 150 Feet CARE Score 6   Wheelchair mobility   Does the patient use a wheelchair? 1  Yes   Type of Wheelchair Used 1  Manual   Method Right upper extremity; Left upper extremity   Curb or Single Stair   Reason if not Attempted Safety concerns   1 Step (Curb) CARE Score 88   4 Steps   Reason if not Attempted Safety concerns   4 Steps CARE Score 88   12 Steps   Reason if not Attempted Safety concerns   12 Steps CARE Score 88   Picking Up Object   Reason if not Attempted Safety concerns   Picking Up Object CARE Score 88   Toilet Transfer   Comment Not required during session   Therapeutic Interventions   Strengthening seated and standing LE TE   Other Comments   Comments R knee dressing in place   Assessment   Treatment Assessment Patient presents with increased weeping from right LE, nsg aware and dressing placed  Patient demonstrates ongoing progress with independence in transfers  Maintains NWBIng well, this date able to perform pull to  parallel bars with NWBing maintained without assist of 2nd person  In need of ongoing interventions to maximize return to PLOF  Family/Caregiver Present Review and demonstration of bed mobility, transfers, wheelchair mobility  HEP   PT Family training done with: Wife present   Problem List Decreased strength;Decreased range of motion;Decreased endurance; Impaired balance;Decreased safety awareness;Decreased skin integrity;Pain;Orthopedic restrictions   PT Barriers   Physical Impairment Decreased strength;Decreased range of motion;Decreased endurance; Impaired balance;Decreased mobility;Orthopedic restrictions;Pain   Functional Limitation Car transfers; Ramp negotiation;Stair negotiation;Standing;Transfers; Walking   Plan   Treatment/Interventions Functional transfer training;LE strengthening/ROM; Elevations; Therapeutic exercise; Endurance training;Patient/family training;Equipment eval/education; Bed mobility;Gait training   PT Therapy Minutes   PT Time In 1230   PT Time Out 1330   PT Total Time (minutes) 60   PT Mode of treatment - Individual (minutes) 60   PT Mode of treatment - Concurrent (minutes) 0   PT Mode of treatment - Group (minutes) 0   PT Mode of treatment - Co-treat (minutes) 0   PT Mode of Treatment - Total time(minutes) 60 minutes   PT Cumulative Minutes 1037     Patient remains OOB in chair, transition to OT session

## 2023-05-27 LAB
GLUCOSE SERPL-MCNC: 107 MG/DL (ref 65–140)
GLUCOSE SERPL-MCNC: 168 MG/DL (ref 65–140)
GLUCOSE SERPL-MCNC: 176 MG/DL (ref 65–140)
GLUCOSE SERPL-MCNC: 97 MG/DL (ref 65–140)

## 2023-05-27 PROCEDURE — 97110 THERAPEUTIC EXERCISES: CPT | Performed by: PHYSICAL THERAPIST

## 2023-05-27 PROCEDURE — 82948 REAGENT STRIP/BLOOD GLUCOSE: CPT

## 2023-05-27 PROCEDURE — 97542 WHEELCHAIR MNGMENT TRAINING: CPT | Performed by: PHYSICAL THERAPIST

## 2023-05-27 PROCEDURE — 97530 THERAPEUTIC ACTIVITIES: CPT | Performed by: PHYSICAL THERAPIST

## 2023-05-27 RX ADMIN — PANTOPRAZOLE SODIUM 40 MG: 40 TABLET, DELAYED RELEASE ORAL at 05:59

## 2023-05-27 RX ADMIN — SENNOSIDES AND DOCUSATE SODIUM 1 TABLET: 50; 8.6 TABLET ORAL at 08:32

## 2023-05-27 RX ADMIN — Medication 250 MG: at 17:11

## 2023-05-27 RX ADMIN — ACETAMINOPHEN 650 MG: 325 TABLET ORAL at 21:10

## 2023-05-27 RX ADMIN — ZOLPIDEM TARTRATE 10 MG: 5 TABLET ORAL at 21:10

## 2023-05-27 RX ADMIN — LEVOTHYROXINE SODIUM 175 MCG: 75 TABLET ORAL at 05:59

## 2023-05-27 RX ADMIN — Medication 3 MG: at 21:10

## 2023-05-27 RX ADMIN — CEFAZOLIN SODIUM 2000 MG: 2 SOLUTION INTRAVENOUS at 13:59

## 2023-05-27 RX ADMIN — INSULIN LISPRO 20 UNITS: 100 INJECTION, SOLUTION INTRAVENOUS; SUBCUTANEOUS at 08:31

## 2023-05-27 RX ADMIN — FERROUS SULFATE TAB 325 MG (65 MG ELEMENTAL FE) 325 MG: 325 (65 FE) TAB at 21:10

## 2023-05-27 RX ADMIN — RIVAROXABAN 10 MG: 10 TABLET, FILM COATED ORAL at 08:32

## 2023-05-27 RX ADMIN — INSULIN LISPRO 2 UNITS: 100 INJECTION, SOLUTION INTRAVENOUS; SUBCUTANEOUS at 08:31

## 2023-05-27 RX ADMIN — VERAPAMIL HYDROCHLORIDE 360 MG: 180 TABLET ORAL at 21:10

## 2023-05-27 RX ADMIN — ESCITALOPRAM OXALATE 5 MG: 5 TABLET, FILM COATED ORAL at 08:32

## 2023-05-27 RX ADMIN — INSULIN LISPRO 2 UNITS: 100 INJECTION, SOLUTION INTRAVENOUS; SUBCUTANEOUS at 12:10

## 2023-05-27 RX ADMIN — INSULIN LISPRO 20 UNITS: 100 INJECTION, SOLUTION INTRAVENOUS; SUBCUTANEOUS at 12:11

## 2023-05-27 RX ADMIN — INSULIN GLARGINE 25 UNITS: 100 INJECTION, SOLUTION SUBCUTANEOUS at 21:10

## 2023-05-27 RX ADMIN — CEFAZOLIN SODIUM 2000 MG: 2 SOLUTION INTRAVENOUS at 21:10

## 2023-05-27 RX ADMIN — INSULIN LISPRO 20 UNITS: 100 INJECTION, SOLUTION INTRAVENOUS; SUBCUTANEOUS at 17:12

## 2023-05-27 RX ADMIN — Medication 250 MG: at 08:32

## 2023-05-27 RX ADMIN — ATORVASTATIN CALCIUM 40 MG: 40 TABLET, FILM COATED ORAL at 08:32

## 2023-05-27 RX ADMIN — CEFAZOLIN SODIUM 2000 MG: 2 SOLUTION INTRAVENOUS at 05:59

## 2023-05-27 NOTE — QUICK NOTE
PM&R Quick note    Chart reviewed  Vitals within normal limits  Last BM 5/25/23, continue current bowel regimen  No acute events reported  Continue to monitor incision site to RLE  Plan for repeat labs Monday morning  Continue to follow medical progress      Vitals:    05/27/23 0725   BP: 120/63   Pulse: 69   Resp: 16   Temp: 98 6 °F (37 °C)   SpO2: 96%       Arsenio Mejia PA-C  PM&R

## 2023-05-27 NOTE — PROGRESS NOTES
05/27/23 1045   Pain Assessment   Pain Assessment Tool 0-10   Pain Score 2   Pain Location/Orientation Orientation: Right;Location: Incision; Location: Knee   Restrictions/Precautions   Precautions Bed/chair alarms; Fall Risk;Pain  (NWBing right LE, gentle AROM only)   Weight Bearing Restrictions Yes   RLE Weight Bearing Per Order NWB   Cognition   Overall Cognitive Status WFL   Subjective   Subjective Reports dressing has not been changed yet, but no weeping through bandage yet  Roll Left and Right   Type of Assistance Needed Independent   Roll Left and Right CARE Score 6   Sit to Lying   Type of Assistance Needed Independent   Sit to Lying CARE Score 6   Lying to Sitting on Side of Bed   Type of Assistance Needed Independent   Lying to Sitting on Side of Bed CARE Score 6   Sit to Stand   Type of Assistance Needed Supervision   Comment partial pull/push to stand using grab bar   Sit to Stand CARE Score 4   Bed-Chair Transfer   Type of Assistance Needed Supervision   Physical Assistance Level No physical assistance   Comment no side rails required   Chair/Bed-to-Chair Transfer CARE Score 4   Car Transfer   Reason if not Attempted Environmental limitations   Car Transfer CARE Score 10   Walk 10 Feet   Reason if not Attempted Safety concerns   Walk 10 Feet CARE Score 88   Walk 50 Feet with Two Turns   Reason if not Attempted Safety concerns   Walk 50 Feet with Two Turns CARE Score 88   Walk 150 Feet   Reason if not Attempted Safety concerns   Walk 150 Feet CARE Score 88   Walking 10 Feet on Uneven Surfaces   Reason if not Attempted Safety concerns   Walking 10 Feet on Uneven Surfaces CARE Score 88   Ambulation   Does the patient walk? 0  No, and walking goal is not clinically indicated     Wheel 50 Feet with Two Turns   Type of Assistance Needed Independent   Wheel 50 Feet with Two Turns CARE Score 6   Wheel 150 Feet   Type of Assistance Needed Independent   Comment 300 ft x 2, level surface   Wheel 150 Feet CARE Score 6   Wheelchair mobility   Does the patient use a wheelchair? 1  Yes   Type of Wheelchair Used 1  Manual   Method Right upper extremity; Left upper extremity   Toilet Transfer   Type of Assistance Needed Supervision   Physical Assistance Level No physical assistance   Comment Grab bar for SPT, assist to manage clothing   Toilet Transfer CARE Score 4   Therapeutic Interventions   Strengthening supine LE TE   Assessment   Treatment Assessment Patient presents supine in bed, agreeable to PT  Patient completed and reviewed supine LE TE  Able to progress to right LE over small bolster for gentle SAQ  Overall making steady progress  No increase in pain noted during session  Cont per POC and progress as tolerated  PT Barriers   Physical Impairment Decreased strength;Decreased range of motion;Decreased endurance; Impaired balance;Decreased mobility;Orthopedic restrictions;Pain   Functional Limitation Car transfers; Ramp negotiation;Stair negotiation;Standing;Transfers; Walking   Plan   Treatment/Interventions Functional transfer training;LE strengthening/ROM; Elevations; Therapeutic exercise; Endurance training;Patient/family training;Equipment eval/education; Bed mobility;Gait training   Progress Progressing toward goals   PT Therapy Minutes   PT Time In 1045   PT Time Out 1145   PT Total Time (minutes) 60   PT Mode of treatment - Individual (minutes) 0   PT Mode of treatment - Concurrent (minutes) 60   PT Mode of treatment - Group (minutes) 0   PT Mode of treatment - Co-treat (minutes) 0   PT Mode of Treatment - Total time(minutes) 60 minutes   PT Cumulative Minutes 1097       HEP issued this date, patient returned to bed with all needs in reach

## 2023-05-28 LAB
GLUCOSE SERPL-MCNC: 156 MG/DL (ref 65–140)
GLUCOSE SERPL-MCNC: 158 MG/DL (ref 65–140)
GLUCOSE SERPL-MCNC: 183 MG/DL (ref 65–140)
GLUCOSE SERPL-MCNC: 70 MG/DL (ref 65–140)

## 2023-05-28 PROCEDURE — 82948 REAGENT STRIP/BLOOD GLUCOSE: CPT

## 2023-05-28 RX ADMIN — VERAPAMIL HYDROCHLORIDE 360 MG: 180 TABLET ORAL at 21:02

## 2023-05-28 RX ADMIN — ZOLPIDEM TARTRATE 10 MG: 5 TABLET ORAL at 21:03

## 2023-05-28 RX ADMIN — INSULIN LISPRO 2 UNITS: 100 INJECTION, SOLUTION INTRAVENOUS; SUBCUTANEOUS at 08:08

## 2023-05-28 RX ADMIN — RIVAROXABAN 10 MG: 10 TABLET, FILM COATED ORAL at 07:21

## 2023-05-28 RX ADMIN — INSULIN LISPRO 20 UNITS: 100 INJECTION, SOLUTION INTRAVENOUS; SUBCUTANEOUS at 12:01

## 2023-05-28 RX ADMIN — INSULIN LISPRO 20 UNITS: 100 INJECTION, SOLUTION INTRAVENOUS; SUBCUTANEOUS at 08:08

## 2023-05-28 RX ADMIN — CEFAZOLIN SODIUM 2000 MG: 2 SOLUTION INTRAVENOUS at 12:40

## 2023-05-28 RX ADMIN — Medication 250 MG: at 08:09

## 2023-05-28 RX ADMIN — ESCITALOPRAM OXALATE 5 MG: 5 TABLET, FILM COATED ORAL at 08:09

## 2023-05-28 RX ADMIN — TAMSULOSIN HYDROCHLORIDE 0.4 MG: 0.4 CAPSULE ORAL at 16:53

## 2023-05-28 RX ADMIN — ACETAMINOPHEN 650 MG: 325 TABLET ORAL at 21:03

## 2023-05-28 RX ADMIN — INSULIN LISPRO 2 UNITS: 100 INJECTION, SOLUTION INTRAVENOUS; SUBCUTANEOUS at 21:03

## 2023-05-28 RX ADMIN — CEFAZOLIN SODIUM 2000 MG: 2 SOLUTION INTRAVENOUS at 05:58

## 2023-05-28 RX ADMIN — INSULIN GLARGINE 25 UNITS: 100 INJECTION, SOLUTION SUBCUTANEOUS at 21:03

## 2023-05-28 RX ADMIN — PANTOPRAZOLE SODIUM 40 MG: 40 TABLET, DELAYED RELEASE ORAL at 05:43

## 2023-05-28 RX ADMIN — FERROUS SULFATE TAB 325 MG (65 MG ELEMENTAL FE) 325 MG: 325 (65 FE) TAB at 21:03

## 2023-05-28 RX ADMIN — CEFAZOLIN SODIUM 2000 MG: 2 SOLUTION INTRAVENOUS at 21:02

## 2023-05-28 RX ADMIN — Medication 3 MG: at 21:02

## 2023-05-28 RX ADMIN — INSULIN LISPRO 2 UNITS: 100 INJECTION, SOLUTION INTRAVENOUS; SUBCUTANEOUS at 12:00

## 2023-05-28 RX ADMIN — ATORVASTATIN CALCIUM 40 MG: 40 TABLET, FILM COATED ORAL at 08:09

## 2023-05-28 RX ADMIN — LEVOTHYROXINE SODIUM 175 MCG: 75 TABLET ORAL at 05:43

## 2023-05-28 RX ADMIN — SENNOSIDES AND DOCUSATE SODIUM 1 TABLET: 50; 8.6 TABLET ORAL at 08:09

## 2023-05-28 RX ADMIN — Medication 250 MG: at 17:22

## 2023-05-28 NOTE — NURSING NOTE
Dressing changed to left dorsum foot ulcer and right leg incisional areas, see photos taken, documentation  and treatment to same as ordered  Dressings remain clean , dry and intact  2 BM's today , transfer  OOB to w/c, w/c mobility to  and transfer to commode all with supervision  For discharge home 5/30/23  Same plan of care continued

## 2023-05-29 LAB
ANION GAP SERPL CALCULATED.3IONS-SCNC: 8 MMOL/L (ref 4–13)
BASOPHILS # BLD AUTO: 0.05 THOUSANDS/ÂΜL (ref 0–0.1)
BASOPHILS NFR BLD AUTO: 1 % (ref 0–1)
BUN SERPL-MCNC: 18 MG/DL (ref 5–25)
CALCIUM SERPL-MCNC: 8.5 MG/DL (ref 8.4–10.2)
CHLORIDE SERPL-SCNC: 102 MMOL/L (ref 96–108)
CO2 SERPL-SCNC: 25 MMOL/L (ref 21–32)
CREAT SERPL-MCNC: 0.67 MG/DL (ref 0.6–1.3)
EOSINOPHIL # BLD AUTO: 0.23 THOUSAND/ÂΜL (ref 0–0.61)
EOSINOPHIL NFR BLD AUTO: 3 % (ref 0–6)
ERYTHROCYTE [DISTWIDTH] IN BLOOD BY AUTOMATED COUNT: 14.1 % (ref 11.6–15.1)
GFR SERPL CREATININE-BSD FRML MDRD: 104 ML/MIN/1.73SQ M
GLUCOSE P FAST SERPL-MCNC: 180 MG/DL (ref 65–99)
GLUCOSE SERPL-MCNC: 126 MG/DL (ref 65–140)
GLUCOSE SERPL-MCNC: 134 MG/DL (ref 65–140)
GLUCOSE SERPL-MCNC: 180 MG/DL (ref 65–140)
GLUCOSE SERPL-MCNC: 197 MG/DL (ref 65–140)
GLUCOSE SERPL-MCNC: 212 MG/DL (ref 65–140)
HCT VFR BLD AUTO: 29.2 % (ref 36.5–49.3)
HGB BLD-MCNC: 9.1 G/DL (ref 12–17)
IMM GRANULOCYTES # BLD AUTO: 0.03 THOUSAND/UL (ref 0–0.2)
IMM GRANULOCYTES NFR BLD AUTO: 0 % (ref 0–2)
LYMPHOCYTES # BLD AUTO: 2.16 THOUSANDS/ÂΜL (ref 0.6–4.47)
LYMPHOCYTES NFR BLD AUTO: 28 % (ref 14–44)
MCH RBC QN AUTO: 29.4 PG (ref 26.8–34.3)
MCHC RBC AUTO-ENTMCNC: 31.2 G/DL (ref 31.4–37.4)
MCV RBC AUTO: 94 FL (ref 82–98)
MONOCYTES # BLD AUTO: 0.98 THOUSAND/ÂΜL (ref 0.17–1.22)
MONOCYTES NFR BLD AUTO: 13 % (ref 4–12)
NEUTROPHILS # BLD AUTO: 4.25 THOUSANDS/ÂΜL (ref 1.85–7.62)
NEUTS SEG NFR BLD AUTO: 55 % (ref 43–75)
NRBC BLD AUTO-RTO: 0 /100 WBCS
PLATELET # BLD AUTO: 408 THOUSANDS/UL (ref 149–390)
PMV BLD AUTO: 10.7 FL (ref 8.9–12.7)
POTASSIUM SERPL-SCNC: 3.9 MMOL/L (ref 3.5–5.3)
RBC # BLD AUTO: 3.1 MILLION/UL (ref 3.88–5.62)
SODIUM SERPL-SCNC: 135 MMOL/L (ref 135–147)
WBC # BLD AUTO: 7.7 THOUSAND/UL (ref 4.31–10.16)

## 2023-05-29 PROCEDURE — 80048 BASIC METABOLIC PNL TOTAL CA: CPT | Performed by: PHYSICAL MEDICINE & REHABILITATION

## 2023-05-29 PROCEDURE — 97542 WHEELCHAIR MNGMENT TRAINING: CPT | Performed by: PHYSICAL THERAPIST

## 2023-05-29 PROCEDURE — 97530 THERAPEUTIC ACTIVITIES: CPT

## 2023-05-29 PROCEDURE — 82948 REAGENT STRIP/BLOOD GLUCOSE: CPT

## 2023-05-29 PROCEDURE — 97535 SELF CARE MNGMENT TRAINING: CPT

## 2023-05-29 PROCEDURE — 97110 THERAPEUTIC EXERCISES: CPT | Performed by: PHYSICAL THERAPIST

## 2023-05-29 PROCEDURE — 85025 COMPLETE CBC W/AUTO DIFF WBC: CPT | Performed by: PHYSICAL MEDICINE & REHABILITATION

## 2023-05-29 PROCEDURE — 97110 THERAPEUTIC EXERCISES: CPT

## 2023-05-29 PROCEDURE — 97530 THERAPEUTIC ACTIVITIES: CPT | Performed by: PHYSICAL THERAPIST

## 2023-05-29 RX ADMIN — ACETAMINOPHEN 650 MG: 325 TABLET ORAL at 21:32

## 2023-05-29 RX ADMIN — Medication 250 MG: at 08:40

## 2023-05-29 RX ADMIN — INSULIN LISPRO 20 UNITS: 100 INJECTION, SOLUTION INTRAVENOUS; SUBCUTANEOUS at 17:28

## 2023-05-29 RX ADMIN — ATORVASTATIN CALCIUM 40 MG: 40 TABLET, FILM COATED ORAL at 08:40

## 2023-05-29 RX ADMIN — CEFAZOLIN SODIUM 2000 MG: 2 SOLUTION INTRAVENOUS at 14:35

## 2023-05-29 RX ADMIN — FERROUS SULFATE TAB 325 MG (65 MG ELEMENTAL FE) 325 MG: 325 (65 FE) TAB at 21:33

## 2023-05-29 RX ADMIN — INSULIN GLARGINE 25 UNITS: 100 INJECTION, SOLUTION SUBCUTANEOUS at 21:32

## 2023-05-29 RX ADMIN — Medication 3 MG: at 21:32

## 2023-05-29 RX ADMIN — CEFAZOLIN SODIUM 2000 MG: 2 SOLUTION INTRAVENOUS at 21:25

## 2023-05-29 RX ADMIN — VERAPAMIL HYDROCHLORIDE 360 MG: 180 TABLET ORAL at 21:32

## 2023-05-29 RX ADMIN — TAMSULOSIN HYDROCHLORIDE 0.4 MG: 0.4 CAPSULE ORAL at 17:27

## 2023-05-29 RX ADMIN — ZOLPIDEM TARTRATE 10 MG: 5 TABLET ORAL at 21:33

## 2023-05-29 RX ADMIN — Medication 250 MG: at 17:27

## 2023-05-29 RX ADMIN — INSULIN LISPRO 2 UNITS: 100 INJECTION, SOLUTION INTRAVENOUS; SUBCUTANEOUS at 12:32

## 2023-05-29 RX ADMIN — PANTOPRAZOLE SODIUM 40 MG: 40 TABLET, DELAYED RELEASE ORAL at 05:33

## 2023-05-29 RX ADMIN — INSULIN LISPRO 20 UNITS: 100 INJECTION, SOLUTION INTRAVENOUS; SUBCUTANEOUS at 08:38

## 2023-05-29 RX ADMIN — LEVOTHYROXINE SODIUM 175 MCG: 75 TABLET ORAL at 05:33

## 2023-05-29 RX ADMIN — INSULIN LISPRO 4 UNITS: 100 INJECTION, SOLUTION INTRAVENOUS; SUBCUTANEOUS at 08:38

## 2023-05-29 RX ADMIN — CEFAZOLIN SODIUM 2000 MG: 2 SOLUTION INTRAVENOUS at 05:33

## 2023-05-29 RX ADMIN — RIVAROXABAN 10 MG: 10 TABLET, FILM COATED ORAL at 08:40

## 2023-05-29 RX ADMIN — ESCITALOPRAM OXALATE 5 MG: 5 TABLET, FILM COATED ORAL at 08:40

## 2023-05-29 RX ADMIN — INSULIN LISPRO 20 UNITS: 100 INJECTION, SOLUTION INTRAVENOUS; SUBCUTANEOUS at 12:33

## 2023-05-29 NOTE — PROGRESS NOTES
05/29/23 1100   Pain Assessment   Pain Assessment Tool 0-10   Pain Score No Pain   Restrictions/Precautions   Precautions Bed/chair alarms; Fall Risk;Limb alert;Pain   RLE Weight Bearing Per Order NWB   ROM Restrictions   (Gentle AROM only, no PROM)   Eating   Type of Assistance Needed Independent   Eating CARE Score 6   Eating Assessment   Food To Mouth Yes   Able To Cut Yes   Positioning Upright   Meal Assessed Lunch   Opens Packages Yes   Oral Hygiene   Type of Assistance Needed Independent   Oral Hygiene CARE Score 6   Grooming   Able To Initiate Tasks; Acquire Items; Wash/Dry Face;Brush/Clean Teeth;Wash/Dry Hands   Findings independent w/c level at the sink   Shower/Bathe Self   Type of Assistance Needed Set-up / clean-up   Shower/Bathe Self CARE Score 5   Bathing   Assessed Bath Style Shower   Anticipated D/C Bath Style Shower;Sponge Bath   Able to [place] Bam No   Able to [employer] Temperature No   Able to Wash/Rinse/Dry (body part) Left Arm;Right Arm;L Upper Leg;R Upper Leg;L Lower Leg/Foot;R Lower Leg/Foot;Chest;Abdomen;Perineal Area; Buttocks   Limitations Noted in Balance; Endurance;Problem Solving;ROM;Safety;Strength  (NWB RLE)   Positioning Seated;Standing   Adaptive Equipment Tub Bench; Shower [employer]   Findings  pt eager to shower  IV LUE covered with plastic and ace wrap removed  with incision gently bathed per PAC recommendations last week  Incision patted dry with min drainage noted form between 2 stitches  RN notified, pt returned to bed and incision wrapped   Tub/Shower Transfer   Limitations Noted In Balance; Endurance;Problem Solving;ROM;Safety;LE Strength  (NWB RLE)   Adaptive Equipment Grab Bars;Transfer Bench   Assessed Shower   Findings S/ CGA   Upper Body Dressing   Type of Assistance Needed Set-up / clean-up   Upper Body Dressing CARE Score 5   Lower Body Dressing   Type of Assistance Needed Supervision   Comment donning sitting EOB over feet and pulling up while supine with bridging and rolling   Lower Body Dressing CARE Score 4   Putting On/Taking Off Footwear   Type of Assistance Needed Supervision   Putting On/Taking Off Footwear CARE Score 4   Dressing/Undressing Clothing   Remove UB Clothes Pullover Shirt   Don UB Clothes Pullover Shirt   Remove LB Clothes Shorts;Socks   Don LB Clothes Shorts;Socks   Limitations Noted In Balance; Endurance;Problem Solving; Safety;Strength;ROM  (NWB RLE)   Positioning Standing;Supported Sit   Sit to Lying   Type of Assistance Needed Independent   Sit to Lying CARE Score 6   Lying to Sitting on Side of Bed   Type of Assistance Needed Independent   Lying to Sitting on Side of Bed CARE Score 6   Sit to Stand   Type of Assistance Needed Supervision   Sit to Stand CARE Score 4   Bed-Chair Transfer   Type of Assistance Needed Independent   Chair/Bed-to-Chair Transfer CARE Score 6   [address] [place]   Type of Assistance Needed Physical assistance   Physical Assistance Level 25% or less   Comment pt requests assist with pants up even though able to maintain NWB RLE and complete  Assist provided to avoid frustration and wife will be assisting at home   Ankit [address] 81 Score 3   [address] A down yes, up no  Manage Hygiene Bladder   Limitations Noted In Balance;Problem Solving;ROM;Safety;LE Strength  (NWB RLE)   Adaptive Equipment Grab Bar   Toilet Transfer   Type of Assistance Needed Supervision   Toilet Transfer CARE Score 4   Toilet Transfer   Surface Assessed Raised Toilet   Transfer Technique Stand Pivot   Limitations Noted In Balance;Problem Solving; Endurance;ROM;Safety;LE Strength  (NWB RLE)   Adaptive Equipment Grab Bar   Exercise Tools   Hand Gripper 5# digiflex 2 sets 15 B hands   Additional Activities   Additional Activities Other (Comment)   Additional Activities Comments w/c mobility in room and short distances in hallway Mod I using BUE to propel   Assessment   Treatment Assessment Pt presents sitting on toilet agreeable to OT session including toileting, a shower, dressing, grooming and related transfers/ mobility as well as light BUE therex  Pt tolerates session and returns to bed for elevation of RLE until dressing replaced after shower  Pt requires supervision and assist due to decreased balance, safety, endurance and strength/ ROM with NWB RLE  Pt is progressing towards goals with discharge to home planned tomorrow  Wife will assist with needs at home and recommend conitnued skilled OT through [place] [phone]  FInal session planned tomorrow before discharge  Problem List Decreased strength;Decreased range of motion;Decreased endurance; Impaired balance;Decreased safety awareness;Decreased skin integrity;Orthopedic restrictions;Pain  (NWB RLE)   Plan   Treatment/Interventions ADL retraining;Functional transfer training; Therapeutic exercise; Endurance training;Patient/family training;Equipment eval/education; Compensatory technique education; Bed mobility   Progress Progressing toward goals   OT Therapy Minutes   OT Time In 1100   OT Time Out 1200   OT Total Time (minutes) 60   OT Mode of treatment - Individual (minutes) 60   Therapy Time missed   Time missed?  No

## 2023-05-29 NOTE — PROGRESS NOTES
05/29/23 1000   Pain Assessment   Pain Assessment Tool 0-10   Pain Score 3   Pain Location/Orientation Orientation: Right;Location: Knee   Restrictions/Precautions   Precautions Bed/chair alarms; Fall Risk;Pain   Weight Bearing Restrictions Yes   RLE Weight Bearing Per Order NWB   ROM Restrictions Yes  (Gentle AROM only, no PROM)   General   Change In Medical/Functional Status Patient notes ongoing weeping from right knee incision   Cognition   Overall Cognitive Status WFL   Subjective   Subjective Reports nice having day off yesterday     Roll Left and Right   Type of Assistance Needed Independent   Physical Assistance Level No physical assistance   Roll Left and Right CARE Score 6   Sit to Lying   Type of Assistance Needed Independent   Physical Assistance Level No physical assistance   Sit to Lying CARE Score 6   Lying to Sitting on Side of Bed   Type of Assistance Needed Independent   Physical Assistance Level No physical assistance   Lying to Sitting on Side of Bed CARE Score 6   Sit to Stand   Type of Assistance Needed Supervision   Physical Assistance Level No physical assistance   Comment Partial pull from grab bar, push to stand   Sit to Stand CARE Score 4   Bed-Chair Transfer   Type of Assistance Needed Independent   Physical Assistance Level No physical assistance   Comment Removed left armrest independently, PT stand by to prevent wheelchair from sliding on tile floor   Chair/Bed-to-Chair Transfer CARE Score 6   Car Transfer   Reason if not Attempted Environmental limitations   Car Transfer CARE Score 10   Walk 10 Feet   Reason if not Attempted Safety concerns   Walk 10 Feet CARE Score 88   Walk 50 Feet with Two Turns   Reason if not Attempted Safety concerns   Walk 50 Feet with Two Turns CARE Score 88   Walk 150 Feet   Reason if not Attempted Safety concerns   Walk 150 Feet CARE Score 88   Walking 10 Feet on Uneven Surfaces   Reason if not Attempted Safety concerns   Walking 10 Feet on Uneven Surfaces CARE Score 88   Ambulation   Does the patient walk? 0  No, and walking goal is not clinically indicated  Wheel 50 Feet with Two Turns   Type of Assistance Needed Independent   Physical Assistance Level No physical assistance   Wheel 50 Feet with Two Turns CARE Score 6   Wheel 150 Feet   Type of Assistance Needed Independent   Physical Assistance Level No physical assistance   Wheel 150 Feet CARE Score 6   Wheelchair mobility   Does the patient use a wheelchair? 1  Yes   Type of Wheelchair Used 1  Manual   Method Right upper extremity; Left upper extremity   Curb or Single Stair   Reason if not Attempted Safety concerns   1 Step (Curb) CARE Score 88   4 Steps   Reason if not Attempted Safety concerns   4 Steps CARE Score 88   12 Steps   Reason if not Attempted Safety concerns   12 Steps CARE Score 88   Picking Up Object   Reason if not Attempted Activity not applicable   Picking Up Object CARE Score 9   Toilet Transfer   Comment Not required during session   Therapeutic Interventions   Strengthening Supine LE TE   Assessment   Treatment Assessment Patient presents supine in bed, NAD  No weeping visible on outer dressing  Does continue with discomfort and swelling in right vs  left LE  Able to maintain NWBing with transfers  Overall making slow and steady progress  Deferred standing this date due to knee pain  In need of ongoing interventions to maximize return to independece as able and promote increased mobility  Cont per POC  Problem List Decreased strength;Decreased range of motion;Decreased endurance; Impaired balance;Decreased safety awareness;Decreased skin integrity;Orthopedic restrictions;Pain  (NWBing R LE)   PT Barriers   Physical Impairment Decreased strength;Decreased range of motion;Decreased endurance; Impaired balance;Decreased mobility;Orthopedic restrictions;Pain   Functional Limitation Car transfers; Ramp negotiation;Stair negotiation;Standing;Transfers; Walking   Plan Treatment/Interventions Functional transfer training;LE strengthening/ROM; Elevations; Therapeutic exercise; Endurance training;Patient/family training;Equipment eval/education; Bed mobility;Gait training   Progress Progressing toward goals   PT Therapy Minutes   PT Time In 1000   PT Time Out 1100   PT Total Time (minutes) 60   PT Mode of treatment - Individual (minutes) 49   PT Mode of treatment - Concurrent (minutes) 11   PT Mode of treatment - Group (minutes) 0   PT Mode of treatment - Co-treat (minutes) 0   PT Mode of Treatment - Total time(minutes) 60 minutes   PT Cumulative Minutes 1157     Patient remains OOB on toilet  OT aware in preparation for upcoming OT session  All needs in reach  Alarm in place and activated  Encouraged use of call bell, patient verbalizes understanding

## 2023-05-30 VITALS
HEIGHT: 76 IN | TEMPERATURE: 98.2 F | WEIGHT: 269.4 LBS | HEART RATE: 69 BPM | BODY MASS INDEX: 32.81 KG/M2 | SYSTOLIC BLOOD PRESSURE: 128 MMHG | DIASTOLIC BLOOD PRESSURE: 79 MMHG | OXYGEN SATURATION: 95 % | RESPIRATION RATE: 16 BRPM

## 2023-05-30 LAB
GLUCOSE SERPL-MCNC: 162 MG/DL (ref 65–140)
GLUCOSE SERPL-MCNC: 249 MG/DL (ref 65–140)

## 2023-05-30 PROCEDURE — 97530 THERAPEUTIC ACTIVITIES: CPT

## 2023-05-30 PROCEDURE — 97110 THERAPEUTIC EXERCISES: CPT

## 2023-05-30 PROCEDURE — 82948 REAGENT STRIP/BLOOD GLUCOSE: CPT

## 2023-05-30 PROCEDURE — 99238 HOSP IP/OBS DSCHRG MGMT 30/<: CPT | Performed by: PHYSICAL MEDICINE & REHABILITATION

## 2023-05-30 RX ORDER — ESCITALOPRAM OXALATE 5 MG/1
5 TABLET ORAL DAILY
Qty: 60 TABLET | Refills: 0 | Status: SHIPPED | OUTPATIENT
Start: 2023-05-30

## 2023-05-30 RX ORDER — INSULIN GLULISINE 100 [IU]/ML
INJECTION, SOLUTION SUBCUTANEOUS
Qty: 60 ML | Refills: 0
Start: 2023-05-30

## 2023-05-30 RX ORDER — LEVOTHYROXINE SODIUM 175 UG/1
175 TABLET ORAL
Qty: 60 TABLET | Refills: 0 | Status: SHIPPED | OUTPATIENT
Start: 2023-05-30

## 2023-05-30 RX ORDER — INSULIN GLARGINE 100 [IU]/ML
INJECTION, SOLUTION SUBCUTANEOUS
Qty: 225 ML | Refills: 0
Start: 2023-05-30

## 2023-05-30 RX ORDER — CLOPIDOGREL BISULFATE 75 MG/1
75 TABLET ORAL DAILY
Qty: 90 TABLET | Refills: 0
Start: 2023-06-12

## 2023-05-30 RX ORDER — FERROUS SULFATE 325(65) MG
325 TABLET ORAL
Qty: 30 TABLET | Refills: 0 | Status: SHIPPED | OUTPATIENT
Start: 2023-05-30

## 2023-05-30 RX ORDER — CEPHALEXIN 750 MG/1
750 CAPSULE ORAL 4 TIMES DAILY
Qty: 4 CAPSULE | Refills: 0 | Status: SHIPPED | OUTPATIENT
Start: 2023-05-30 | End: 2023-05-31

## 2023-05-30 RX ADMIN — RIVAROXABAN 10 MG: 10 TABLET, FILM COATED ORAL at 08:04

## 2023-05-30 RX ADMIN — ESCITALOPRAM OXALATE 5 MG: 5 TABLET, FILM COATED ORAL at 08:04

## 2023-05-30 RX ADMIN — ATORVASTATIN CALCIUM 40 MG: 40 TABLET, FILM COATED ORAL at 08:04

## 2023-05-30 RX ADMIN — PANTOPRAZOLE SODIUM 40 MG: 40 TABLET, DELAYED RELEASE ORAL at 05:31

## 2023-05-30 RX ADMIN — Medication 250 MG: at 08:04

## 2023-05-30 RX ADMIN — CEFAZOLIN SODIUM 2000 MG: 2 SOLUTION INTRAVENOUS at 05:31

## 2023-05-30 RX ADMIN — INSULIN LISPRO 20 UNITS: 100 INJECTION, SOLUTION INTRAVENOUS; SUBCUTANEOUS at 08:05

## 2023-05-30 RX ADMIN — INSULIN LISPRO 4 UNITS: 100 INJECTION, SOLUTION INTRAVENOUS; SUBCUTANEOUS at 08:05

## 2023-05-30 RX ADMIN — LEVOTHYROXINE SODIUM 175 MCG: 75 TABLET ORAL at 05:31

## 2023-05-30 NOTE — NURSING NOTE
Patient discharged to home with home health referral   Discharge instructions reviewed with patient and spouse who verbalized understanding of same  Sutures removed from Right Leg incisions prior to discharge   Patient tolerated procedure well  Personal belongings inventory completed  Personal belongings taken with patient at time of discharge  Transported via personal vehicle which IDT deemed safest mode of transport  Patient and spouse verbalized appreciation for cares and services received while on ARU

## 2023-05-30 NOTE — PROGRESS NOTES
05/30/23 1122   Pain Assessment   Pain Assessment Tool 0-10   Pain Score No Pain   Restrictions/Precautions   Precautions Fall Risk;Limb alert;Pain   RLE Weight Bearing Per Order NWB   ROM Restrictions   (Gentle AROM, no PROM)   Picking Up Object   Comment pt reports he will not be standing and declines using reacher to attempt retrieval form the floor  Reason if not Attempted Refused to perform   Picking Up Object CARE Score 7   Lying to Sitting on Side of Bed   Type of Assistance Needed Independent   Lying to Sitting on Side of Bed CARE Score 6   Bed-Chair Transfer   Type of Assistance Needed Independent   Chair/Bed-to-Chair Transfer CARE Score 6   Exercise Tools   Hand Gripper 5# digiflex 2 sets 15 B hands   Other Exercise Tool 1 Sanderbox 2 sets 15 all directions with BUE and 3# wt   Other Exercise Tool 2 yellow flex therabar 2 sets 15 up and down BUE   Assessment   Treatment Assessment Pt presents supine agreeable to brief OT session including BUE therex and theract  Pt dclines a full ADL with belongings packed and pt eager to return to home  Pt tolerates session without complaints although continues to require sueprvision and assist due to decreased balance, safety, endurance and strength/ ROM with NWB RLE  Discahrge to home with wife planned this day  Problem List Decreased strength;Decreased range of motion;Decreased endurance; Impaired balance;Decreased safety awareness;Decreased skin integrity;Orthopedic restrictions;Pain  (NWB RLE)   Plan   Treatment/Interventions ADL retraining;Functional transfer training; Therapeutic exercise; Endurance training;Patient/family training;Equipment eval/education; Compensatory technique education   Progress Progressing toward goals   Recommendation   Discharge Summary Pt participates in ADLs, transfers/ mobility, light homemaking and BUE therex  Pt has made progress with goals partially met   Wife present for FT last week and will assist with transition into home setting  pt requires assist and supervision due to decerased balance, safety, endurance, strength/ ROM with NWB RLE  Discharge planned later this day, 5/30/23 with HHOT recommended and DME in place except tub bench which wife will order  OT Therapy Minutes   OT Time In 4513   OT Time Out 1208   OT Total Time (minutes) 46   OT Mode of treatment - Individual (minutes) 46   Therapy Time missed   Time missed? No   Addendum: Pt is mod I w/c mobility in room and hallway

## 2023-05-30 NOTE — PROGRESS NOTES
05/29/23 1330   Pain Assessment   Pain Assessment Tool 0-10   Pain Score 3   Pain Location/Orientation Orientation: Right;Location: Knee; Location: Incision   Restrictions/Precautions   Precautions Bed/chair alarms; Fall Risk;Limb alert;Pain   Weight Bearing Restrictions Yes   RLE Weight Bearing Per Order NWB   ROM Restrictions Yes  (Gentle AROM, no PROM)   Cognition   Overall Cognitive Status WFL   Subjective   Subjective Patient reports nervous about going home  Roll Left and Right   Type of Assistance Needed Independent   Physical Assistance Level No physical assistance   Roll Left and Right CARE Score 6   Sit to Lying   Type of Assistance Needed Independent   Physical Assistance Level No physical assistance   Sit to Lying CARE Score 6   Lying to Sitting on Side of Bed   Type of Assistance Needed Independent   Physical Assistance Level No physical assistance   Lying to Sitting on Side of Bed CARE Score 6   Sit to Stand   Type of Assistance Needed Independent   Physical Assistance Level No physical assistance   Comment grab bar, pull to stand   Sit to Stand CARE Score 6   Bed-Chair Transfer   Type of Assistance Needed Independent   Physical Assistance Level No physical assistance   Comment Did not require removal of arm rest for transfer from wheelchair to bed   Chair/Bed-to-Chair Transfer CARE Score 6   Car Transfer   Reason if not Attempted Environmental limitations   Car Transfer CARE Score 10   Walk 10 Feet   Reason if not Attempted Safety concerns   Walk 10 Feet CARE Score 88   Walk 50 Feet with Two Turns   Reason if not Attempted Safety concerns   Walk 50 Feet with Two Turns CARE Score 88   Walk 150 Feet   Reason if not Attempted Safety concerns   Walk 150 Feet CARE Score 88   Walking 10 Feet on Uneven Surfaces   Reason if not Attempted Safety concerns   Walking 10 Feet on Uneven Surfaces CARE Score 88   Ambulation   Does the patient walk? 0  No, and walking goal is not clinically indicated     Wheel 50 Feet with Two Turns   Type of Assistance Needed Independent   Physical Assistance Level No physical assistance   Wheel 50 Feet with Two Turns CARE Score 6   Wheel 150 Feet   Type of Assistance Needed Independent   Physical Assistance Level No physical assistance   Comment >300 ft   Wheel 150 Feet CARE Score 6   Wheelchair mobility   Does the patient use a wheelchair? 1  Yes   Type of Wheelchair Used 1  Manual   Method Right upper extremity; Left upper extremity   Curb or Single Stair   Reason if not Attempted Safety concerns   1 Step (Curb) CARE Score 88   4 Steps   Reason if not Attempted Safety concerns   4 Steps CARE Score 88   12 Steps   Reason if not Attempted Safety concerns   12 Steps CARE Score 88   Therapeutic Interventions   Strengthening Seated UE TE - blue band, 2 5#   Assessment   Treatment Assessment Patient presents supine in agreeable to PT  [facility]  Rest periods required due to UE fatigue  Continues to make progress  In need of ongoing interventions to maximize return to PLOF  Problem List Decreased strength;Decreased range of motion;Decreased endurance; Impaired balance;Decreased safety awareness;Decreased skin integrity;Orthopedic restrictions;Pain   PT Barriers   Physical Impairment Decreased strength;Decreased range of motion;Decreased endurance; Impaired balance;Decreased mobility;Orthopedic restrictions;Pain   Functional Limitation Car transfers; Ramp negotiation;Stair negotiation;Standing;Transfers; Walking   Plan   Treatment/Interventions Functional transfer training;LE strengthening/ROM; Elevations; Therapeutic exercise; Endurance training;Patient/family training;Equipment eval/education; Bed mobility;Gait training   Progress Progressing toward goals   PT Therapy Minutes   PT Time In 1330   PT Time Out 1430   PT Total Time (minutes) 60   PT Mode of treatment - Individual (minutes) 60   PT Mode of treatment - Concurrent (minutes) 0   PT Mode of treatment - Group (minutes) 0   PT Mode of treatment - Co-treat (minutes) 0   PT Mode of Treatment - Total time(minutes) 60 minutes   PT Cumulative Minutes 1217      05/29/23 1330   Pain Assessment   Pain Assessment Tool 0-10   Pain Score 3   Pain Location/Orientation Orientation: Right;Location: Knee; Location: Incision   Restrictions/Precautions   Precautions Bed/chair alarms; Fall Risk;Limb alert;Pain   Weight Bearing Restrictions Yes   RLE Weight Bearing Per Order NWB   ROM Restrictions Yes  (Gentle AROM, no PROM)   Cognition   Overall Cognitive Status WFL   Subjective   Subjective Patient reports nervous about going home     Roll Left and Right   Type of Assistance Needed Independent   Physical Assistance Level No physical assistance   Roll Left and Right CARE Score 6   Sit to Lying   Type of Assistance Needed Independent   Physical Assistance Level No physical assistance   Sit to Lying CARE Score 6   Lying to Sitting on Side of Bed   Type of Assistance Needed Independent   Physical Assistance Level No physical assistance   Lying to Sitting on Side of Bed CARE Score 6   Sit to Stand   Type of Assistance Needed Independent   Physical Assistance Level No physical assistance   Comment grab bar, pull to stand   Sit to Stand CARE Score 6   Bed-Chair Transfer   Type of Assistance Needed Independent   Physical Assistance Level No physical assistance   Comment Did not require removal of arm rest for transfer from wheelchair to bed   Chair/Bed-to-Chair Transfer CARE Score 6   Car Transfer   Reason if not Attempted Environmental limitations   Car Transfer CARE Score 10   Walk 10 Feet   Reason if not Attempted Safety concerns   Walk 10 Feet CARE Score 88   Walk 50 Feet with Two Turns   Reason if not Attempted Safety concerns   Walk 50 Feet with Two Turns CARE Score 88   Walk 150 Feet   Reason if not Attempted Safety concerns   Walk 150 Feet CARE Score 88   Walking 10 Feet on Uneven Surfaces   Reason if not Attempted Safety concerns   Walking 10 Feet on Uneven Surfaces CARE Score 88   Ambulation   Does the patient walk? 0  No, and walking goal is not clinically indicated  Wheel 50 Feet with Two Turns   Type of Assistance Needed Independent   Physical Assistance Level No physical assistance   Wheel 50 Feet with Two Turns CARE Score 6   Wheel 150 Feet   Type of Assistance Needed Independent   Physical Assistance Level No physical assistance   Comment >300 ft   Wheel 150 Feet CARE Score 6   Wheelchair mobility   Does the patient use a wheelchair? 1  Yes   Type of Wheelchair Used 1  Manual   Method Right upper extremity; Left upper extremity   Curb or Single Stair   Reason if not Attempted Safety concerns   1 Step (Curb) CARE Score 88   4 Steps   Reason if not Attempted Safety concerns   4 Steps CARE Score 88   12 Steps   Reason if not Attempted Safety concerns   12 Steps CARE Score 88   Therapeutic Interventions   Strengthening Seated UE TE - blue band, 2 5#   Assessment   Treatment Assessment Patient presents supine in agreeable to PT  Patient completed wheelchair [facility] from [facility] to [facility], TE performed outside  Rest periods required due to UE fatigue  Continues to make progress  In need of ongoing interventions to maximize return to PLOF  Problem List Decreased strength;Decreased range of motion;Decreased endurance; Impaired balance;Decreased safety awareness;Decreased skin integrity;Orthopedic restrictions;Pain   PT Barriers   Physical Impairment Decreased strength;Decreased range of motion;Decreased endurance; Impaired balance;Decreased mobility;Orthopedic restrictions;Pain   Functional Limitation Car transfers; Ramp negotiation;Stair negotiation;Standing;Transfers; Walking   Plan   Treatment/Interventions Functional transfer training;LE strengthening/ROM; Elevations; Therapeutic exercise; Endurance training;Patient/family training;Equipment eval/education; Bed mobility;Gait training   Progress Progressing toward goals   PT Therapy Minutes   PT Time In 1330   PT Time Out 1430   PT Total Time (minutes) 60   PT Mode of treatment - Individual (minutes) 60   PT Mode of treatment - Concurrent (minutes) 0   PT Mode of treatment - Group (minutes) 0   PT Mode of treatment - Co-treat (minutes) 0   PT Mode of Treatment - Total time(minutes) 60 minutes   PT Cumulative Minutes 1217

## 2023-05-30 NOTE — PLAN OF CARE
Problem: PAIN - ADULT  Goal: Verbalizes/displays adequate comfort level or baseline comfort level  Description: Interventions:  - Encourage patient to monitor pain and request assistance  - Assess pain using appropriate pain scale  - Administer analgesics based on type and severity of pain and evaluate response  - Implement non-pharmacological measures as appropriate and evaluate response  - Consider cultural and social influences on pain and pain management  - Notify physician/advanced practitioner if interventions unsuccessful or patient reports new pain  Outcome: Progressing     Problem: INFECTION - ADULT  Goal: Absence or prevention of progression during hospitalization  Description: INTERVENTIONS:  - Assess and monitor for signs and symptoms of infection  - Monitor lab/diagnostic results  - Monitor all insertion sites, i e  indwelling lines, tubes, and drains  - Monitor endotracheal if appropriate and nasal secretions for changes in amount and color  - [place] appropriate cooling/warming therapies per order  - Administer medications as ordered  - Instruct and encourage patient and family to use good hand hygiene technique  - Identify and instruct in appropriate isolation precautions for identified infection/condition  Outcome: Progressing  Goal: Absence of fever/infection during neutropenic period  Description: INTERVENTIONS:  - Monitor WBC    Outcome: Progressing

## 2023-05-30 NOTE — CASE MANAGEMENT
Tx team recommendations & DCI reviewed with Pt & Pt's spouse, who expressed an understanding & agreement  Pt being [address] home this afternoon, being transported by family via car, which tx team has determined to be a safe mode of transport  FU appts indicated on DC instructions, to be scheduled by Pt per scheduling preferences  Sudheer Schaefer arranged with Leni (Nsg, PT, OT), per Pt preferences from choice list provided  The Pt's current course of Tx & post DC goals of care have been shared with Post-acute care service providers  Notified IP ins team that insurance remains noted as Medicare as primary; DCI faxed to [employer] demonstrating medical necessity  Addendum: Per IP Ins team, Pt's primary insurance is Medicare & [employer] is secondary

## 2023-05-30 NOTE — TEAM CONFERENCE
Acute RehabilitationTeam Conference Note  Date: 5/30/2023   Time: 10:39 AM       Patient Name:  Byron Kemp       Medical Record Number: [id number]   YOB: 1965  Sex:  Male          Room/Bed:  [facility] 211/[facility] 211-01  Payor Info:  Payor: [employer] / Plan: MEDICARE A AND B / Product Type: Medicare A & B Fee for Service /      Admitting Diagnosis: Leg fracture, right [S82 [id number]]   Admit Date/Time:  5/10/2023  4:11 PM  Admission Comments: No comment available     Primary Diagnosis:  Closed fracture of medial plateau of right tibia  Principal Problem: Closed fracture of medial plateau of right tibia    Patient Active Problem List    Diagnosis Date Noted   • MANNY (obstructive sleep apnea) 05/01/2023   • Closed fracture of medial plateau of right tibia 04/13/2023   • Depression and anxiety 03/03/2023   • Acquired genu varum of right lower extremity 02/21/2023   • MARKELL (acute kidney injury) ([facility] ) 02/14/2023   • Chronic kidney disease-mineral and bone disorder 02/14/2023   • Closed fracture of right tibial plateau 60/82/1326   • Stage 3b chronic kidney disease ([facility] ) 11/15/2022   • Hyperkalemia 11/15/2022   • Alcoholism ([facility] ) 11/15/2022   • Dupuytren's contracture of right hand 06/07/2022   • Type 2 diabetes mellitus with diabetic peripheral angiopathy without gangrene ([facility] ) 02/07/2022   • Physical deconditioning 12/21/2021   • Acquired hypothyroidism 10/18/2021   • Acquired absence of right foot ([facility] ) 03/29/2021   • Persistent proteinuria 01/12/2021   • Abnormal EKG 12/03/2020   • Preoperative examination 12/03/2020   • Insomnia 12/03/2020   • Acute blood loss anemia 10/08/2020   • Hypomagnesemia 09/09/2020   • Urinary retention 09/08/2020   • Thyroid cancer ([facility] ) 08/20/2020   • PAD (peripheral artery disease) ([facility] ) 08/05/2020   • Hyponatremia 08/04/2020   • Health maintenance examination 07/06/2020   • Elevated liver enzymes 10/24/2019   • Colon polyps 09/12/2019   • Right-sided tinnitus 11/26/2018   • Hypertension, essential 09/24/2018   • Gastroesophageal reflux disease without esophagitis 09/24/2018   • Obesity, Class I, BMI 30 0-34 9 (see actual BMI) 03/25/2010   • Hyperlipidemia 12/17/2009   • History of nonadherence to medical treatment 02/20/2008       Physical Therapy:    Weight Bearing Status: Non-weight bearing (R LE)  Transfers: Independent  Bed Mobility: Independent  Amulation Distance (ft): 0 feet  Ambulation:  (NA)  Wheelchair Mobility Distance: 300 ft  Wheelchair Mobility: Independent  Number of Stairs: 0  Assistive Device for Stairs:  (NA)  Stair Assistance:  (NA, NWBing R LE, unable to attempt due to UB weakness)  Ramp: Independent  Assistive Device for Ramp: Wheelchair  Discharge Recommendations: Home with:  [address] with[de-identified] Family Support, Home Physical Therapy, First Floor Setup    5/16/2023:  Patient seen by [facility] PT, making slow but steady progress  Presents following closed of medial plateau of right tibia, with decreased ROM/strength, decreased balance and safety, decreased endurance, and pain  Patient MI bed mobility, S/CGA transfers with sit pivot transfer, N/A ambulation or negotiation of stairs  Wheelchair propulsion with S/min A for >150 feet but with multiple rest breaks  Patient would benefit from continued inpatient [facility] PT to increase function, safety, and increased independence in prep for safe d/c to home  5/23/2023:  Patient seen by [facility] PT, making slow but steady progress  Limited by endurance, frequent rest breaks  Ongoing NWBing status, drainage from knee  Presents following closed of medial plateau of right tibia, with decreased ROM/strength, decreased balance and safety, decreased endurance, and pain  Patient MI bed mobility, S to unaffected side, S/CGA/min A to affected side transfers with sit pivot transfer, N/A ambulation or negotiation of stairs  Wheelchair propulsion with MI/S for >150 feet but with multiple rest breaks    Able to perform pull to stand with mod A at parallel bars max time 70 seconds  Patient would benefit from continued inpatient [facility] PT to increase function, safety, and increased independence in prep for safe d/c to home  5/30/2023:  Patient seen by [facility] PT, making slow but steady progress  Limited by endurance, frequent rest breaks  Ongoing NWBing status, drainage from knee is lessened, but ongoing  Presents following closed of medial plateau of right tibia, with decreased ROM/strength, decreased balance and safety, decreased endurance, and pain  Patient now cleared for gentle AROM only, no PROM, no WBing R LE  Patient MI bed mobility, MI to unaffected side, MI/Cl S to affected side transfers with sit pivot transfer, N/A ambulation or negotiation of stairs  Wheelchair propulsion with MI for >150 feet but with multiple rest breaks  Able to perform pull to stand with mod A at parallel bars max time  seconds  Patient would benefit from continued inpatient [facility] PT to increase function, safety, and increased independence in prep for safe d/c to home  Occupational Therapy:  Eating: Independent  Grooming: Independent  Bathing: Supervision  Bathing: Supervision  Upper Body Dressing: Supervision  Lower Body Dressing: Supervision  Toileting: Minimal Assistance  Tub/Shower Transfer: Supervision, Incidental Touching  Toilet Transfer: Supervision  Cognition: Within Defined Limits  Orientation: Person, Time, Situation, Place  Discharge Recommendations: Home with:  [address] with[de-identified] Family Support, First Floor Setup, Home Occupational Therapy       5/16/23: Pt's current LOF listed above  Barriers to d/c include decreased strength throughout but especially RLE s/p tibia fx, NWB RLE with immobilizer AAT, decreased balance, pain in RLE, and decreased activity tolerance; all affect independence in self care and fxl transfers   Pt participating in ADL training, therapeutic activities/exercises, functional mobility/transfers, and activity tolerance in order to progress towards goals  Pt would benefit from continued skilled OT services in order to address listed barriers and prepare for safe d/c     5/22/23: Pt participates in ADLs, transfers/ mobility and BUE therex  Barriers to d/c include decreased strength throughout but especially RLE s/p tibia fx with improvements noted, NWB RLE with immobilizer AAT, decreased balance, pain in RLE, and decreased activity tolerance; all affect independence in self care and fxl transfers  Pt participating in ADL training, therapeutic activities/exercises, functional mobility/transfers, and activity tolerance in order to progress towards goals  Pt reports having one w/c and one transport chair at home  Pt would benefit from continued skilled OT services in order to address listed barriers and prepare for safe d/c     5/29/23: Pt participates in ADLs, transfers/ mobility and BUE therex  Barriers to d/c include decreased strength throughout but especially RLE s/p tibia fx with improvements noted, NWB RLE, decreased balance, pain in RLE, and decreased activity tolerance; all affect independence in self care and fxl transfers  Pt participating in ADL training, therapeutic activities/exercises, functional mobility/transfers, and activity tolerance in order to progress towards goals  Pt is progressing towards goals with current LOF as listed above  Pt would benefit from continued skilled OT services in order to address listed barriers and prepare for safe d/c  To home with wife  FT completed last week with concerns about the tub reviewed and recommend HHOT to assess tub accessibility and transfer before showering resumes  Discharge planned later this day with goals partially met  Speech Therapy:           No notes on file    Nursing Notes:  Appetite: Good  Diet Type: Diabetic                      Diet Patient/Family Education Complete: Yes    Type of Wound (LDA):  Wound                    Type of Wound Patient/Family Education: Yes  Bladder: Continent     Bladder Patient/Family Education: Yes  Bowel: Continent     Bowel Patient/Family Education: Yes  Pain Location/Orientation: Orientation: Right, Location: Leg  Pain Score: 0                       Hospital Pain Intervention(s): Declines  Pain Patient/Family Education: Yes  Medication Management/Safety  Injectable: Insulin  Safe Administration: Yes  Reason for non-safe administration: not attempted  Medication Patient/Family Education Complete: Yes    5/16/2023 Pt admitted 5-10-23 After ORIF Right Tibia Plateau  A/O x 4 , Heart -reg, lungs clear with an occ  Dry cough  Abdomen soft  Continent of B & B , Using urinal at night time , otherwise uses toilet  Pt  Is NWB to RLE  Pt does have wound to left foot for which podiatry has been consulted and being followed by wound care who are to re-evaluate on Wed  RLE with incisions 2+ edema with some drainage has F/U appointment with Ortho this afternoon  At present time has knee immobilizer for his RLE      5/22/23 Patient remains alert and oriented  Lungs are clear on room air  Patient is continent of bladder with urinal usage  Last bowel movement of 5/18 pt now receiving senna-s to help with bowel movements  Left foot wound with daily dressing changes completed by nursing  Right knee incisions with sutures and steri strips in place draining large amounts of serousanguenous drainage with twice daily dressing changes completed by nursing  Edema noted to the right knee  Patient no longer using a knee immobilizer  Patient receiving IV ancef every 8 hours for cellulitis following ortho follow up appointment  Blood sugars are being checked four times a day and insulin administered as required  Pt states he was self injecting insulin with and insulin pen prior to admission  Patient to maintain non-weight bearing status to the right lower extremity at all times         Case Management:     Discharge Planning  Living Arrangements: Lives w/ "Spouse/significant other  Support Systems: Spouse/significant other  Assistance Needed: none  Type of Current Residence: Private residence  Current Home Care Services: No  Initial assessment & orientation to [facility] with Pt, and phone contact with Pt's wife, who expressed understanding & agreement  Pt resides in a SS home with his wife, who is retired & drives  There are 3 steps in, which is Pt's self-ststed primary goal of rehab \"to do 3 stairs so I can get in the bathroom\"  The stairs to enter the residence have planks on them to serve as a ramp  Pt expressed that he is pleased to be in this facility, as he has had negative experiences in other rehabs out of state  Provided him with a supportive outlet & provided encouragement  Mood was appropriate for situation  Discussed role of team members & reviewed [address] with Pt & Pt's spouse, who expressed understanding & agreement  See UR section for insurance details; Pt was initially denied & needed a peer to peer to be admitted into [facility]; Pt & spouse both expressing that \"2 weeks won't be enough\"; discussed taking a day by day approach  SW will continue to monitor & assist as needed with [address]  5/16 - Tx team recommendations reviewed with patient, who expressed understanding  Pt declined for this worker to call his significant other, stating that he prefers to do so himself  Target DC date is 5/26 with [facility] (PT, OT); a list of providers was provided to Pt & a referral will be made based on Pt preference  Pt became tearful in discussing the DC plan; he expressed feeling that he does not think that is enough time to reach his mobility goals & stated \"I don't want to rely on others to help me anymore\", and reflected on the manner in which his self-identity has been negatively impacted by a loss of independence; provided validation & emotional support & encouragement   SW will continue to monitor & assist as needed with Tx & DC planning     5/23/23 - Tx " team recommendations reviewed with patient & spouse, who expressed understanding & agreement  Target DC date is Tuesday, 5/30 with [facility] (Nsg, PT, OT); a list of providers was provided to Pt & a referral will be made based on Pt preference  Pt will transition to oral antibiotics on 5/30  Wound care has included dressing changes BID, which needs to be taught to family  FT arranged for Friday at 12:30 to review therapy recommendations with PT & OT, & wound care/dressing changes with nursing  SW will continue to monitor & assist as needed with Tx & DC planning  Update to be faxed to insurance company today noting the medical necessity of extension through 5/30/23 per [facility] medical team       Is the patient actively participating in therapies? yes  List any modifications to the treatment plan: na    Barriers Interventions   Cellulitis, hypothyroidism, DM Medical management and oversight, reqs insulin, IV transitioning to po meds   Surgical wound, diabetic wound Local care, ortho follow-up   Decreased end, balance Therapeutic exercise, therapeutic activity   NWB right LE ADL, transfer, wc mobility training   Decreased ROM and srength Therapeutic exercise, therapeutic activity   pain Medication management     Is the patient making expected progress toward goals?  yes  List any update or changes to goals: na    Medical Goals: Patient will be able to manage medical conditions and comorbid conditions with medications and follow up upon completion of rehab program    Weekly Team Goals:   Rehab Team Goals  ADL Team Goal: Patient will require supervision with ADLs with least restrictive device upon completion of rehab program  Transfer Team Goal: Patient will be independent with transfers with least restrictive device upon completion of rehab program  Locomotion Team Goal: Patient will be independent with locomotion with least restrictive device upon completion of rehab program     Mod I bed mobility, transfers, and wc mobility  S bathing and dressing  Mod I toileting     Health and wellness: to be able to return home and care for dog    Discussion: Plan for return home with wife with [facility] for PT, OT, nursing, and aides    Anticipated Discharge Date:  May 30, 2023

## 2023-05-30 NOTE — PHYSICAL THERAPY NOTE
PT DISCHARGE SUMMARY    Patient HAS met max benefit from inpatient [facility] PT  Patient MI Bed mobility; MI Transfers; Gait and stairs with AD not assessed due to [address] status, does demonstrate MI for wheelchair mobility  on level and unlevel surfaces  Will benefit from continued PT services post discharge

## 2023-05-30 NOTE — DISCHARGE SUMMARY
Discharge Summary - PMR   Susan Melendez 59 y o  male MRN: [id number]  Unit/Bed#: -01 Encounter: [id number]    Admission Date: 5/10/2023     Discharge Date: 5/30/23    Etiologic/Rehabilitation Diagnosis: Impairment of mobility, safety and Activities of Daily Living (ADLs) due to Orthopedic Disorders:  08 9  Other Orthopedic Closed fracture of medial plataeu of right tibia     HPI: Vickie Cool a 59 y  o  male with a PMH significant for CKD, Diabetes mellitus, GERD, HLD and HTN who presented to the [facility] 05/01 for surgical management of a previous tibial plateau fracture  Patient seen in April for the same and opted out of surgical intervention at that time  Now, presented with varus malalignment, instability of the joint and significant comminution and bone void  He was taken to the OR with Dr Neena Vieyra on 05/01 and is s/p ORIF to the right tibial plateau  Ordered NWB status post-op for approximately 10-12 weeks with immobilizer in place for 2 weeks post-op  Post-op patient developed urinary retention  Urology consulted  Patient refused straight cath and was following urinary retention protocol to only get casey catheter insertion if needed   Nephrology consulted with CKD history, started on IV fluid resuscitation  Developed hyponatremia  Home HCTZ was held and IVF were stopped  Etiology determined to be SIADH secondary to HCTZ  Once HCTZ was stopped, sodium started to improve  Patient evaluated by PT/OT and deemed appropriate for post acute rehabilitation services  He was accepted to [facility] and arrived on 5/10/23  Procedures Performed During [facility]C Admission: none    Acute Rehabilitation Center Course: Patient participated in a comprehensive interdisciplinary inpatient rehabilitation program which included involvment of MD, therapies (PT, OT, and/or SLP), RN, CM, SW, dietary, and psychology services   He was able to be advanced to an overall supervision level of assist and considered safe for discharge home with family  Please see below for patient's day to day management of medical needs  Stage 3b chronic kidney disease [facility])  Assessment & Plan  Lab Results   Component Value Date    CREATININE 0 67 05/29/2023    CREATININE 0 68 05/22/2023    CREATININE 0 77 05/17/2023    EGFR 104 05/29/2023    EGFR 103 05/22/2023    EGFR 98 05/17/2023   · Followed in acute care with Nephrology  · S/P IV Fluids  · HCTZ d/c in setting of hyponatremia     Type 2 diabetes mellitus with diabetic peripheral angiopathy without gangrene [facility])  Assessment & Plan  Lab Results   Component Value Date    HGBA1C 7 9 (H) 04/24/2023       Recent Labs     05/29/23  1148 05/29/23  1620 05/29/23 2013 05/30/23  0751   POCGLU 197* 134 126 249*       Blood Sugar Average: Last 72 hrs:  (P) 196 [id number]   · Continue Lantus 25 units daily HS, 20 units humalog TID AC, SSI  · Home: Lantus 30 units daily HS, Humalog 20 units TID AC  · Carb controlled diet   · May resume home regimen once discharged    Acquired hypothyroidism  Assessment & Plan  · Levothyroxine 150 mcg daily increased to 175 mcg in setting of elevated TSH  · 11/14- 35 400, 5/15- 26 562  · Will need repeat TSH at next PCP follow up      Insomnia  Assessment & Plan  · Continue Ambien 10 mg HS PRN and Melatonin     PAD (peripheral artery disease) (HCC)  Assessment & Plan  · Home Plavix 75 mg daily currently on hold with Xarelto added for DVT ppx  · May resume once Xarelto is completed      Hyponatremia  Assessment & Plan  · Seen by Nephrology in acute care  · Etiology determined to be SIADH secondary to HCTZ  · HCTZ since stopped   · 135 today, may be followed up with regular blood work with PCP     Hyperlipidemia  Assessment & Plan  · Continue Lipitor 40 mg     Gastroesophageal reflux disease without esophagitis  Assessment & Plan  · Continue Protonix 40 mg daily  · Home: Prilosec 40 mg daily    Hypertension, essential  Assessment & Plan  · Monitor vitals  · Continue Verapamil 360 mg daily HS    * Closed fracture of medial plateau of right tibia  Assessment & Plan  · Complex history   · Arrived 5/1 for surgical intervention of ongoing right tibial plateau fracture  · Previously in SNF but found to have varus malalignment, instability of the joint, significant comminution and bone void  · S/p ORIF on 05/01 with Dr Alexandro Thornton  · NWB for 10-12 weeks  · Followed up with Dr Alexandro Thornton on 5/16/23  · For DVT ppx x6 weeks since post-op- Lovenox discontinued per patient's request  Kael Garzon initiated  · Continue Xarelto for remainder of 6 weeks  · Price checked at $50 for 30-day supply  · Home aspirin and Plavix held while on Xarelto, may resume once Xarelto is completed   · Noted to have Cellulitis levi-incision site at Ortho follow up appointment  · Continue IV Ancef day 14/14  · Will not complete 2/3 today as he is being discharged at 1230  · Will send Keflex 750 mg x4 daily for one day supply   · Continue Probiotic BID while on antibiotic  · Gentle ROM of the knee, ok for AROM, hold PROM at this time   · Follow up in 2 weeks with ortho for repeat XR  · Patient made aware to call to schedule next appointment   · Sutures will not be removed until patient follows up with Ortho as outpatient  · Repeat XR 5/21: improved alignment of proximal tibial fractures s/p ORIF  Intact hardware   · Acute chomprehensive interdisciplinary inpatient rehabilitation to include intensive skilled therapies as outlines with oversight and management by a rehabilitation Physician Assistant overseen by rehabilitation physician as well as inpatient rehabilitation nursing, case management and weekly interdisciplinary team meeting          Discharge Physical Examination:  Physical Exam  Vitals reviewed  Constitutional:       General: He is not in acute distress  Appearance: He is not ill-appearing  HENT:      Head: Normocephalic and atraumatic     Eyes:      Pupils: Pupils are equal, round, and reactive to light  Cardiovascular:      Rate and Rhythm: Normal rate and regular rhythm  Pulses: Normal pulses  Heart sounds: Normal heart sounds  No murmur heard  Pulmonary:      Effort: Pulmonary effort is normal  No respiratory distress  Breath sounds: Normal breath sounds  Abdominal:      General: Bowel sounds are normal  There is no distension  Palpations: Abdomen is soft  Musculoskeletal:         General: Swelling present  Right lower leg: No edema  Left lower leg: No edema  Comments: Improvements in swelling noted to right knee/levi-incision area to RLE   Skin:     General: Skin is warm and dry  Comments: Erythema continues to improve  Resolved to medial incision, lateral incision still with mild pink discoloration levi-incision  Drainage remains to come from medial incision, mid-line  Minimal amount of serosanguinous drainage noted on dressings  Not actively bleeding when dressings removed  Suture's intact  Dry/scaly skin to surrounding area- recommended hydrating cream to dry/scaling skin   Left heel wound- healing well  No drainage/erythema   Neurological:      General: No focal deficit present  Mental Status: He is alert and oriented to person, place, and time  Psychiatric:         Mood and Affect: Mood normal          Behavior: Behavior normal            Significant Findings, Care, Treatment and Services Provided: Patient found to have cellulits to the RLE incision site  Initiated on IV Ancef, completed 14 days, will be sent with oral Keflex 750 mg 4x daily for a one day supply to complete antibiotic treatment  To follow up with ortho as outpatient  Patient participated with PT/OT x3hr/day, 5-7x/week  To continue therapy with [facility] with eventual transition to outpatient therapy  Followed by PMR/IM providers while inpatient  Seen by Ortho as an outpatient during [facility] stay       Complications: Cellulitis RLE s/p IV antibiotic treatment    Functional Status Upon Admission to ARC:  Physical Therapy: bed mobility supervision, transfers not assessed, ambulation not tested  Occupational Therapy: LB dressing mod  Speech Therapy: n/a    Functional Status Upon Discharge from ARC:   Physical Therapy Occupational Therapy Speech Therapy   Weight Bearing Status: Non-weight bearing (R LE)  Transfers: Independent  Bed Mobility: Independent  Amulation Distance (ft): 0 feet  Ambulation:  (NA)  Wheelchair Mobility Distance: 300 ft  Wheelchair Mobility: Independent  Number of Stairs: 0  Assistive Device for Stairs:  (NA)  Stair Assistance:  (NA, NWBing R LE, unable to attempt due to UB weakness)  Ramp: Independent  Assistive Device for Ramp: Wheelchair  Discharge Recommendations: Home with:  [address] with[de-identified] Family Support, Home Physical Therapy, First Floor Setup   Eating: Independent  Grooming: Independent  Bathing: Supervision  Bathing: Supervision  Upper Body Dressing: Supervision  Lower Body Dressing: Supervision  Toileting: Minimal Assistance  Tub/Shower Transfer: Supervision, Incidental Touching  Toilet Transfer: Supervision  Cognition: Within Defined Limits  Orientation: Person, Time, Situation, Place               This patient was discussed by the Interdisciplinary Team in weekly case conference today  The care of the patient was extensively discussed with all care providers and an appropriate rehabilitation plan was formulated unique for this patient  Barriers were identified preventing progression of therapy and appropriate interventions were discussed with each discipline  Please see the team note for input from all disciplines regarding barriers, intervention, and discharge planning  Discussed in team  Final recommendations to discharge home with Sudheer Schaefer PT/OT/N  To follow up with Ortho in 2 weeks or sooner if able       Discharge Diagnosis: Impairment of mobility, safety and Activities of Daily Living (ADLs) due to Orthopedic Disorders:  08 9 Other Orthopedic closed fracture of medial plataeu of right tibia    Discharge Medications:   See after visit summary for reconciled discharge medications provided to patient and family  Condition at Discharge: good     Discharge instructions/Information to patient and family:   See after visit summary for information provided to patient and family  Provisions for Follow-Up Care:  See after visit summary for information related to follow-up care and any pertinent home health orders  Future Appointments   Date Time Provider Skip Mckenna   11/2/2023  1:00 PM [facility] 1 [facility]       Disposition: See After Visit Summary for discharge disposition information  Planned Readmission: No    Discharge Statement   I spent more than 30 minutes discharging the patient  This time was spent on the day of discharge  I had direct contact with the patient on the day of discharge  Greater than 50% of the total time was spent examining patient, answering all patient questions, arranging and discussing plan of care with patient as well as directly providing post-discharge instructions  Additional time then spent on discharge activities  Discharge Medications:  See after visit summary for reconciled discharge medications provided to patient and family

## 2023-06-01 NOTE — PROGRESS NOTES
06/01/23 1101   Hello, [Guardian’s Name / Patient’s Sangita Shelley, this is [Caller Sangita Shelley from Located within [facility], and our clinical care team wanted to check on you / your child after your recent visit to the hospital  It will only take 3-5 minutes  Is this a good time? Discharge Call Type/ Specific Diagnosis: General Call   Call Complete   Attempted Number of Calls 1   Discharge phone call complete?  Left Message  (voicemail box is full)

## 2023-06-13 ENCOUNTER — TELEPHONE (OUTPATIENT)
Dept: OBGYN CLINIC | Facility: HOSPITAL | Age: 58
End: 2023-06-13

## 2023-06-13 NOTE — TELEPHONE ENCOUNTER
Caller: Shima Sanchez, [address]     Doctor: Rory Sood    Reason for call: Patient is using right leg more than he is supposed to  Patient fell on 6/10 while transitioning from the wheelchair to bed and used his right leg, that is supposed to be NWB, to get himself back up  Shima Sanchez just wanted the doctor to know       Call back#: [phone]

## 2023-06-13 NOTE — TELEPHONE ENCOUNTER
Spoke with Raheem aWyne and he states that patient has no increased pain, or swelling after fall with WB to R LE  Raheem Wayne will continue to reinforce to patient NWB to R LE and will call should there be any further issues  Patient has PO next Tuesday with Dr Kelsi Cabrera  Please advise

## 2023-06-16 ENCOUNTER — TELEPHONE (OUTPATIENT)
Dept: OBGYN CLINIC | Facility: HOSPITAL | Age: 58
End: 2023-06-16

## 2023-06-16 ENCOUNTER — TELEPHONE (OUTPATIENT)
Dept: FAMILY MEDICINE CLINIC | Facility: CLINIC | Age: 58
End: 2023-06-16

## 2023-06-16 DIAGNOSIS — S82.131P: Primary | ICD-10-CM

## 2023-06-16 NOTE — TELEPHONE ENCOUNTER
Called and spoke w/pt and pt's significant other, Eduard Mcclellan  They will send pictures to my email [email or web address]  Will forward when received 
Called and spoke w/pt and relayed Dr Riaz Tolentino in detail  Pt states he understands 
Caller: MIESHA with patient    Doctor: Luke Payne    Reason for call: Patient has 2 incisions on the RLL  Medial incision is draining quite a bit and through the ABD pads onto the bed  Warm to touch  Temp 99 1  Please advise      Call back#: [phone]
Pictures received from pt as requested by Dr Santo Perez 
20

## 2023-06-16 NOTE — TELEPHONE ENCOUNTER
Keisha Schulz (GAMALIEL) called to report to Dr Prather - pt has 2 surgical incisions on the RLL Medial incision -  with infection and has a lot of drainage coming out - also his temp is 99 1 which is higher than usually  Warm to touch  Pt declines  all other s/s  Requested to sent message to Dr Prather for advisements  Just would  like to make Dr Prather aware of  Notes they followed up with ORTHO  Keisha Schulz can be reached @ [phone]

## 2023-06-20 ENCOUNTER — OFFICE VISIT (OUTPATIENT)
Dept: OBGYN CLINIC | Facility: CLINIC | Age: 58
End: 2023-06-20

## 2023-06-20 ENCOUNTER — APPOINTMENT (OUTPATIENT)
Dept: RADIOLOGY | Facility: CLINIC | Age: 58
End: 2023-06-20
Payer: MEDICARE

## 2023-06-20 VITALS
WEIGHT: 269 LBS | BODY MASS INDEX: 32.76 KG/M2 | DIASTOLIC BLOOD PRESSURE: 67 MMHG | HEART RATE: 80 BPM | SYSTOLIC BLOOD PRESSURE: 96 MMHG | HEIGHT: 76 IN

## 2023-06-20 DIAGNOSIS — Z98.890 S/P ORIF (OPEN REDUCTION INTERNAL FIXATION) FRACTURE: Primary | ICD-10-CM

## 2023-06-20 DIAGNOSIS — S82.131P: ICD-10-CM

## 2023-06-20 DIAGNOSIS — Z87.81 S/P ORIF (OPEN REDUCTION INTERNAL FIXATION) FRACTURE: Primary | ICD-10-CM

## 2023-06-20 PROCEDURE — 73560 X-RAY EXAM OF KNEE 1 OR 2: CPT

## 2023-06-20 PROCEDURE — 99024 POSTOP FOLLOW-UP VISIT: CPT | Performed by: ORTHOPAEDIC SURGERY

## 2023-06-20 NOTE — PROGRESS NOTES
Orthopaedics Office Visit - Post-op Patient Visit    ASSESSMENT/PLAN:    Assessment:   7 weeks s/p ORIF right tibial plateau ORIF malunion/nonunion, DOS 5/1/23   Now with early varus collapse, possible early migration of hardware into joint space    Patient sustained fall out of bed    Minimal pain today, AROM of 5-90    Plan:   · The patient's x-rays were reviewed today  · We will continue to monitor the patient's status  · In 3 weeks we will evaluate if he may begin weight bearing as tolerated  · Continue non-weightbearing  · Follow-up in 3 weeks    To Do Next Visit:  Re-evaluation and new x-rays     _____________________________________________________  CHIEF COMPLAINT:  Chief Complaint   Patient presents with   • Right Knee - Post-op         SUBJECTIVE:  Giselle Lara is a 59 y o  male who presents 7 weeks status post ORIF right tibial plateau ORIF nonunion  DOS 5/1/23  He has been home for almost a month now and hs been discharged from the rehab facility  The patient reports the medial incision has had drainage which has been bloody  He stopped his Plavix about 5 days ago and the drainage has stopped  He fell out of his wheel chair about 2 weeks  He did not land on his leg/ knee  He fell from the bed to the floor while transferring into his wheel chair  He has home physical therapy but they will not do anything with him until he can weight bear therefor his next appointment isn't until early July        SOCIAL HISTORY:  Social History     Tobacco Use   • Smoking status: Never   • Smokeless tobacco: Never   Vaping Use   • Vaping Use: Never used   Substance Use Topics   • Alcohol use: Not Currently     Comment: quit 1-2023   • Drug use: Not Currently     Types: Marijuana       MEDICATIONS:    Current Outpatient Medications:   •  acetaminophen (TYLENOL) 325 mg tablet, Take 2 tablets (650 mg total) by mouth every 8 (eight) hours as needed for mild pain, Disp: 60 tablet, Rfl: 0  •  BD PEN NEEDLE DEV U/F 32G X 4 MM MISC, Inject under the skin 4 (four) times a day, Disp: 400 each, Rfl: 1  •  Blood Glucose Monitoring Suppl (ONE TOUCH ULTRA 2) w/Device KIT, Use 2 (two) times a day, Disp: 1 kit, Rfl: 0  •  Blood Glucose Monitoring Suppl (OneTouch Verio Reflect) w/Device KIT, Check blood sugars twice daily  Please substitute with appropriate alternative as covered by patient's insurance  Dx: E11 65, Disp: 1 kit, Rfl: 0  •  clopidogrel (PLAVIX) 75 mg tablet, Take 1 tablet (75 mg total) by mouth daily Do not start before June 12, 2023 , Disp: 90 tablet, Rfl: 0  •  escitalopram (LEXAPRO) 5 mg tablet, Take 1 tablet (5 mg total) by mouth daily, Disp: 60 tablet, Rfl: 0  •  ferrous sulfate 325 (65 Fe) mg tablet, Take 1 tablet (325 mg total) by mouth daily at bedtime, Disp: 30 tablet, Rfl: 0  •  glucose blood (OneTouch Verio) test strip, Check blood sugars twice daily  Please substitute with appropriate alternative as covered by patient's insurance  Dx: E11 65, Disp: 200 each, Rfl: 3  •  Insulin Glargine Solostar (Lantus SoloStar) 100 UNIT/ML SOPN, Inject 45 units bid, Disp: 225 mL, Rfl: 0  •  insulin glulisine (Apidra) 100 units/mL injection, Inject 30 units tid with meals, Disp: 60 mL, Rfl: 0  •  levothyroxine 175 mcg tablet, Take 1 tablet (175 mcg total) by mouth daily in the early morning, Disp: 60 tablet, Rfl: 0  •  omeprazole (PriLOSEC) 40 MG capsule, TAKE 1 CAPSULE DAILY (Patient taking differently: 40 mg every morning), Disp: 90 capsule, Rfl: 3  •  OneTouch Delica Lancets 52O MISC, Check blood sugars twice daily  Please substitute with appropriate alternative as covered by patient's insurance   Dx: E11 65, Disp: 200 each, Rfl: 3  •  verapamil (VERELAN PM) 360 MG 24 hr capsule, TAKE 1 CAPSULE DAILY AT BEDTIME, Disp: 90 capsule, Rfl: 3  •  zolpidem (AMBIEN) 10 mg tablet, Take 1 tablet (10 mg total) by mouth daily at bedtime as needed for sleep, Disp: 90 tablet, Rfl: 0  •  atorvastatin (LIPITOR) 40 mg tablet, Take 1 tablet "(40 mg total) by mouth daily with dinner (Patient taking differently: Take 40 mg by mouth every morning), Disp: 90 tablet, Rfl: 3  •  rivaroxaban (XARELTO) 10 mg tablet, Take 1 tablet (10 mg total) by mouth daily with breakfast for 12 days Do not start before May 31, 2023 , Disp: 12 tablet, Rfl: 0    REVIEW OF SYSTEMS:  MSK: right leg  Neuro: WNL  Pertinent items are otherwise noted in HPI  A comprehensive review of systems was otherwise negative     _____________________________________________________  PHYSICAL EXAMINATION:  Vital signs: BP 96/67   Pulse 80   Ht 6' 4\" (1 93 m)   Wt 122 kg (269 lb)   BMI 32 74 kg/m²   General: No acute distress, awake and alert  Psychiatric: Mood and affect appear appropriate  HEENT: Trachea Midline, No torticollis, no apparent facial trauma  Cardiovascular: No audible murmurs; Extremities appear perfused  Pulmonary: No audible wheezing or stridor  Skin: No open lesions; see further details (if any) below    MUSCULOSKELETAL EXAMINATION:  Extremities:      Right Knee:   Surgical incision is healing well for the postoperative course  There is no erythema with small scab at the lateral incision  There is a normal postoperative effusion  Range of motion from 7 to 90  The patient has decreased motion of the ankle  The patient is able to perform a straight leg raise  Calf is soft and nontender  Extremity is warm and well perfused  Sensation is intact  Brisk capillary refill  The patient is neurovascular intact distally  The patient is in a wheel chair and is non-weightbearing     _____________________________________________________  STUDIES REVIEWED:  I personally reviewed the images and interpretation is as follows:    X-rays taken 6/20/23 of right knee demonstrate early varus collapse compared to previous imaging, medial translation of femur on tibia without fabiola dislocation    PROCEDURES PERFORMED:  Procedures  None performed         Scribe Attestation  " I,:  Lori Woods am acting as a scribe while in the presence of the attending physician :       I,:  Gilberto Rosario MD personally performed the services described in this documentation    as scribed in my presence :

## 2023-06-22 DIAGNOSIS — F32.A DEPRESSION, UNSPECIFIED DEPRESSION TYPE: ICD-10-CM

## 2023-06-22 RX ORDER — ESCITALOPRAM OXALATE 5 MG/1
5 TABLET ORAL DAILY
Qty: 60 TABLET | Refills: 0 | Status: SHIPPED | OUTPATIENT
Start: 2023-06-22

## 2023-06-26 ENCOUNTER — TELEPHONE (OUTPATIENT)
Dept: OBGYN CLINIC | Facility: HOSPITAL | Age: 58
End: 2023-06-26

## 2023-06-26 NOTE — TELEPHONE ENCOUNTER
Caller: [facility]    Doctor: Brittani Tomlinson    Reason for call: Calling with significant findings     Call back#: na

## 2023-07-31 DIAGNOSIS — Z87.81 S/P ORIF (OPEN REDUCTION INTERNAL FIXATION) FRACTURE: Primary | ICD-10-CM

## 2023-07-31 DIAGNOSIS — Z98.890 S/P ORIF (OPEN REDUCTION INTERNAL FIXATION) FRACTURE: Primary | ICD-10-CM

## 2023-08-01 ENCOUNTER — APPOINTMENT (OUTPATIENT)
Dept: RADIOLOGY | Facility: CLINIC | Age: 58
End: 2023-08-01
Payer: MEDICARE

## 2023-08-01 ENCOUNTER — OFFICE VISIT (OUTPATIENT)
Dept: OBGYN CLINIC | Facility: CLINIC | Age: 58
End: 2023-08-01
Payer: MEDICARE

## 2023-08-01 VITALS
WEIGHT: 269 LBS | HEART RATE: 86 BPM | BODY MASS INDEX: 32.76 KG/M2 | SYSTOLIC BLOOD PRESSURE: 128 MMHG | DIASTOLIC BLOOD PRESSURE: 88 MMHG | HEIGHT: 76 IN

## 2023-08-01 DIAGNOSIS — Z98.890 S/P ORIF (OPEN REDUCTION INTERNAL FIXATION) FRACTURE: Primary | ICD-10-CM

## 2023-08-01 DIAGNOSIS — Z87.81 S/P ORIF (OPEN REDUCTION INTERNAL FIXATION) FRACTURE: ICD-10-CM

## 2023-08-01 DIAGNOSIS — Z98.890 S/P ORIF (OPEN REDUCTION INTERNAL FIXATION) FRACTURE: ICD-10-CM

## 2023-08-01 DIAGNOSIS — Z87.81 S/P ORIF (OPEN REDUCTION INTERNAL FIXATION) FRACTURE: Primary | ICD-10-CM

## 2023-08-01 PROCEDURE — 73560 X-RAY EXAM OF KNEE 1 OR 2: CPT

## 2023-08-01 PROCEDURE — 99214 OFFICE O/P EST MOD 30 MIN: CPT | Performed by: ORTHOPAEDIC SURGERY

## 2023-08-01 NOTE — PROGRESS NOTES
Orthopaedics Office Visit - Postop Patient Visit    ASSESSMENT/PLAN:    Assessment:   3months s/p ORIF right tibial plateau ORIF malunion/nonunion, DOS 5/1/23   Now with varus collapse, and migration of hardware into joint space  Continues to have no pain  Knee is warm but no drainage  Mild GI illness today, but was not present in previous wks      Plan:   · Xrays of right knee were obtained in the office today and reviewed with patient. · It was discussed that hardware has displaced into the joint space with worsening of the varus collapse. · Treatment options were discussed including surgical intervention involving removal of hardware with obtaining cultures versus conservative management of weightbearing as tolerated with TROM brace locked in extension  · Potential risks of not having surgical intervention includes potential risk of infection and systemic illness as well as further hardware migration. · Patient will ultimately require total knee arthroplasty if hardware is removed. · Provided PT script for patient to progress to WBAT right lower extremity, to work on gait training, and to strengthen the quad. · Patient will consider the options at this time and start with WBAT in TROM brace locked in extension. · Patient will follow-up in 3 weeks for re-evaluation. Advised that at this point, recommend surgical intervention to remove hardware and take cultures. Next stage after that would be to consider arthroplasty/reconstruction of joint. In order to do this, would need to prove no infection in joint and A1c should be lower than 7.0% which he has not yet attained as of last lab  Patient currently does not want to undergo surgery, but I advised him to call if he changes his mind. Advised he is currently at risk for chronic pain, dysfunction, impaired limb function, loss of limb, infection ,sepsis, septic shock, need for additional procedures, death.  Patient voiced understanding    Will evaluate films in 3 wks after patient initiates weight bearing    To Do Next Visit:  Xrays of right knee    _____________________________________________________  CHIEF COMPLAINT:  Chief Complaint   Patient presents with   • Right Knee - Post-op         SUBJECTIVE:  Tyrone Shearer is a 59 y.o. male who presents status 3 months status post ORIF right tibial plateau ORIF nonunion  DOS 5/1/23. Patient was last seen in the office on 6/20/2023 where patient was advised to remain nonweightbearing right lower extremity. Patient reports he is feeling anxious today about his xrays. Patient states he has nausea, vomiting, and high blood sugar today. Patient denies any pain in the right knee. Patient states he has tried to maintain nonweightbearing status as much as possible but may have put some weight through the leg during transfers. Patient denies any fevers or chills.         PAST MEDICAL HISTORY:  Past Medical History:   Diagnosis Date   • Broken internal right knee prosthesis ([address]) 01/2023   • Chronic kidney disease    • Colon polyp    • Constipation 03/09/2023   • Diabetes mellitus ([address])    • Disease of thyroid gland    • GERD (gastroesophageal reflux disease)    • HHS vs DKA 11/16/2018   • Hyperlipidemia    • Hypertension    • Thyroid cancer ([address])        PAST SURGICAL HISTORY:  Past Surgical History:   Procedure Laterality Date   • COLONOSCOPY     • FOOT AMPUTATION Right    • FOOT SURGERY Right 2014   • IR AORTAGRAM WITH RUN-OFF  08/06/2020   • IR TUNNELED CENTRAL LINE PLACEMENT  09/08/2020   • IR TUNNELED CENTRAL LINE REMOVAL  09/28/2020   • MI AMPUTATION FOOT TRANSMETARSAL Right 09/03/2020    Procedure: AMPUTATION TRANSMETATARSAL (TMA);  Surgeon: Mili Victor DPM;  Location: [facility];  Service: Podiatry   • MI OPEN [place] UNICONDYLAR Right 5/1/2023    Procedure: OPEN REDUCTION W/ INTERNAL FIXATION (ORIF) RIGHT TIBIAL PLATEAU NONUNION;  Surgeon: Kennedy Jeronimo MD;  Location: [facility];  Service: Orthopedics   • ME THYROIDECTOMY TOTAL/COMPLETE N/A 02/03/2021    Procedure: TOTAL THYROIDECTOMY WITH NIMS MONITORING;  Surgeon: Sridhar Villarreal MD;  Location: [facility];  Service: ENT   • TOE AMPUTATION Right 08/11/2020    Procedure: AMPUTATION TOE;  Surgeon: Angie Richards DPM;  Location: [facility];  Service: Podiatry   • US GUIDED THYROID BIOPSY  07/02/2020       FAMILY HISTORY:  Family History   Problem Relation Age of Onset   • Cancer Mother    • Hypertension Father        SOCIAL HISTORY:  Social History     Tobacco Use   • Smoking status: Never   • Smokeless tobacco: Never   Vaping Use   • Vaping Use: Never used   Substance Use Topics   • Alcohol use: Not Currently     Comment: quit 1-2023   • Drug use: Not Currently     Types: Marijuana       MEDICATIONS:    Current Outpatient Medications:   •  acetaminophen (TYLENOL) 325 mg tablet, Take 2 tablets (650 mg total) by mouth every 8 (eight) hours as needed for mild pain, Disp: 60 tablet, Rfl: 0  •  BD PEN NEEDLE DEV U/F 32G X 4 MM MISC, Inject under the skin 4 (four) times a day, Disp: 400 each, Rfl: 1  •  Blood Glucose Monitoring Suppl (ONE TOUCH ULTRA 2) w/Device KIT, Use 2 (two) times a day, Disp: 1 kit, Rfl: 0  •  Blood Glucose Monitoring Suppl (OneTouch Verio Reflect) w/Device KIT, Check blood sugars twice daily. Please substitute with appropriate alternative as covered by patient's insurance. Dx: E11.65, Disp: 1 kit, Rfl: 0  •  clopidogrel (PLAVIX) 75 mg tablet, Take 1 tablet (75 mg total) by mouth daily Do not start before June 12, 2023., Disp: 90 tablet, Rfl: 0  •  escitalopram (LEXAPRO) 5 mg tablet, Take 1 tablet (5 mg total) by mouth daily, Disp: 60 tablet, Rfl: 0  •  ferrous sulfate 325 (65 Fe) mg tablet, Take 1 tablet (325 mg total) by mouth daily at bedtime, Disp: 30 tablet, Rfl: 0  •  glucose blood (OneTouch Verio) test strip, Check blood sugars twice daily. Please substitute with appropriate alternative as covered by patient's insurance.  Dx: E11.65, Disp: 200 each, Rfl: 3  •  Insulin Glargine Solostar (Lantus SoloStar) 100 UNIT/ML SOPN, Inject 45 units bid, Disp: 225 mL, Rfl: 0  •  insulin glulisine (Apidra) 100 units/mL injection, Inject 30 units tid with meals, Disp: 60 mL, Rfl: 0  •  levothyroxine 175 mcg tablet, Take 1 tablet (175 mcg total) by mouth daily in the early morning, Disp: 60 tablet, Rfl: 0  •  omeprazole (PriLOSEC) 40 MG capsule, TAKE 1 CAPSULE DAILY (Patient taking differently: 40 mg every morning), Disp: 90 capsule, Rfl: 3  •  OneTouch Delica Lancets 67E MISC, Check blood sugars twice daily. Please substitute with appropriate alternative as covered by patient's insurance. Dx: E11.65, Disp: 200 each, Rfl: 3  •  verapamil (VERELAN PM) 360 MG 24 hr capsule, TAKE 1 CAPSULE DAILY AT BEDTIME, Disp: 90 capsule, Rfl: 3  •  zolpidem (AMBIEN) 10 mg tablet, Take 1 tablet (10 mg total) by mouth daily at bedtime as needed for sleep, Disp: 90 tablet, Rfl: 0  •  atorvastatin (LIPITOR) 40 mg tablet, Take 1 tablet (40 mg total) by mouth daily with dinner (Patient taking differently: Take 40 mg by mouth every morning), Disp: 90 tablet, Rfl: 3  •  rivaroxaban (XARELTO) 10 mg tablet, Take 1 tablet (10 mg total) by mouth daily with breakfast for 12 days Do not start before May 31, 2023., Disp: 12 tablet, Rfl: 0    ALLERGIES:  Allergies   Allergen Reactions   • Vancomycin Other (See Comments)     Kidney issues   • Pollen Extract Eye Swelling     Eyes get watery        REVIEW OF SYSTEMS:  MSK: See below  Neuro: WNL  Pertinent items are otherwise noted in HPI. A comprehensive review of systems was otherwise negative.     LABS:  HgA1c:   Lab Results   Component Value Date    HGBA1C 7.9 (H) 04/24/2023     BMP:   Lab Results   Component Value Date    GLUCOSE 109 05/01/2023    CALCIUM 8.5 05/29/2023    K 3.9 05/29/2023    CO2 25 05/29/2023     05/29/2023    BUN 18 05/29/2023    CREATININE 0.67 05/29/2023     CBC: No components found for: "CBC"    _____________________________________________________  PHYSICAL EXAMINATION:  Vital signs: /88   Pulse 86   Ht 6' 4" (1.93 m)   Wt 122 kg (269 lb)   BMI 32.74 kg/m²   General: No acute distress, awake and alert  Psychiatric: Mood and affect appear appropriate  HEENT: Trachea Midline, No torticollis, no apparent facial trauma  Cardiovascular: No audible murmurs; Extremities appear perfused  Pulmonary: No audible wheezing or stridor  Skin: No open lesions; see further details (if any) below    MUSCULOSKELETAL EXAMINATION:  Extremities:    RLE  Patient is a 73-year-old male sitting comfortably in wheelchair in no acute distress. Incisions are well healed without erythema or drainage. Mild warmth on palpation of right knee. Significant swelling in right knee. Able to perform active range of motion from 3 to 90 degrees. No significant tenderness over right knee. Calf is supple and nontender      _____________________________________________________  STUDIES REVIEWED:  I personally reviewed the images and interpretation is as follows:  Xrays of right knee obtained in the office today show hardware intact with varus collapse worsening compared to prior films.      PROCEDURES PERFORMED:  Procedures   None    Willa GABRIEL Berger - Emily Koehler MD

## 2023-08-03 DIAGNOSIS — F32.A DEPRESSION, UNSPECIFIED DEPRESSION TYPE: ICD-10-CM

## 2023-08-03 RX ORDER — ESCITALOPRAM OXALATE 5 MG/1
5 TABLET ORAL DAILY
Qty: 60 TABLET | Refills: 5 | Status: SHIPPED | OUTPATIENT
Start: 2023-08-03

## 2023-08-04 ENCOUNTER — HOSPITAL ENCOUNTER (EMERGENCY)
Facility: HOSPITAL | Age: 58
Discharge: HOME | End: 2023-08-04
Attending: EMERGENCY MEDICINE
Payer: MEDICARE

## 2023-08-04 VITALS
DIASTOLIC BLOOD PRESSURE: 85 MMHG | OXYGEN SATURATION: 99 % | SYSTOLIC BLOOD PRESSURE: 135 MMHG | RESPIRATION RATE: 22 BRPM | BODY MASS INDEX: 28.79 KG/M2 | WEIGHT: 236.55 LBS | TEMPERATURE: 97.6 F | HEART RATE: 92 BPM

## 2023-08-04 DIAGNOSIS — F41.9 ANXIETY: ICD-10-CM

## 2023-08-04 DIAGNOSIS — R11.2 NAUSEA AND VOMITING: Primary | ICD-10-CM

## 2023-08-04 LAB
ANION GAP SERPL CALCULATED.3IONS-SCNC: 13 MMOL/L
ATRIAL RATE: 86 BPM
BASOPHILS # BLD AUTO: 0.06 THOUSANDS/ÂΜL (ref 0–0.1)
BASOPHILS NFR BLD AUTO: 1 % (ref 0–1)
BUN SERPL-MCNC: 26 MG/DL (ref 5–25)
CALCIUM SERPL-MCNC: 10.5 MG/DL (ref 8.4–10.2)
CHLORIDE SERPL-SCNC: 102 MMOL/L (ref 96–108)
CO2 SERPL-SCNC: 21 MMOL/L (ref 21–32)
CREAT SERPL-MCNC: 0.98 MG/DL (ref 0.6–1.3)
EOSINOPHIL # BLD AUTO: 0.1 THOUSAND/ÂΜL (ref 0–0.61)
EOSINOPHIL NFR BLD AUTO: 1 % (ref 0–6)
ERYTHROCYTE [DISTWIDTH] IN BLOOD BY AUTOMATED COUNT: 14.2 % (ref 11.6–15.1)
GFR SERPL CREATININE-BSD FRML MDRD: 83 ML/MIN/1.73SQ M
GLUCOSE SERPL-MCNC: 101 MG/DL (ref 65–140)
HCT VFR BLD AUTO: 41.7 % (ref 36.5–49.3)
HGB BLD-MCNC: 14 G/DL (ref 12–17)
IMM GRANULOCYTES # BLD AUTO: 0.03 THOUSAND/UL (ref 0–0.2)
IMM GRANULOCYTES NFR BLD AUTO: 0 % (ref 0–2)
LYMPHOCYTES # BLD AUTO: 2.38 THOUSANDS/ÂΜL (ref 0.6–4.47)
LYMPHOCYTES NFR BLD AUTO: 25 % (ref 14–44)
MCH RBC QN AUTO: 28.7 PG (ref 26.8–34.3)
MCHC RBC AUTO-ENTMCNC: 33.6 G/DL (ref 31.4–37.4)
MCV RBC AUTO: 86 FL (ref 82–98)
MONOCYTES # BLD AUTO: 0.99 THOUSAND/ÂΜL (ref 0.17–1.22)
MONOCYTES NFR BLD AUTO: 10 % (ref 4–12)
NEUTROPHILS # BLD AUTO: 6.13 THOUSANDS/ÂΜL (ref 1.85–7.62)
NEUTS SEG NFR BLD AUTO: 63 % (ref 43–75)
NRBC BLD AUTO-RTO: 0 /100 WBCS
P AXIS: 38 DEGREES
PLATELET # BLD AUTO: 295 THOUSANDS/UL (ref 149–390)
PMV BLD AUTO: 11.8 FL (ref 8.9–12.7)
POTASSIUM SERPL-SCNC: 3.3 MMOL/L (ref 3.5–5.3)
PR INTERVAL: 164 MS
QRS AXIS: -27 DEGREES
QRSD INTERVAL: 104 MS
QT INTERVAL: 398 MS
QTC INTERVAL: 476 MS
RBC # BLD AUTO: 4.88 MILLION/UL (ref 3.88–5.62)
SODIUM SERPL-SCNC: 136 MMOL/L (ref 135–147)
T WAVE AXIS: 78 DEGREES
VENTRICULAR RATE: 86 BPM
WBC # BLD AUTO: 9.69 THOUSAND/UL (ref 4.31–10.16)

## 2023-08-04 PROCEDURE — 99285 EMERGENCY DEPT VISIT HI MDM: CPT | Performed by: PHYSICIAN ASSISTANT

## 2023-08-04 PROCEDURE — 93010 ELECTROCARDIOGRAM REPORT: CPT | Performed by: INTERNAL MEDICINE

## 2023-08-04 PROCEDURE — 80048 BASIC METABOLIC PNL TOTAL CA: CPT | Performed by: PHYSICIAN ASSISTANT

## 2023-08-04 PROCEDURE — 36415 COLL VENOUS BLD VENIPUNCTURE: CPT | Performed by: PHYSICIAN ASSISTANT

## 2023-08-04 PROCEDURE — 99284 EMERGENCY DEPT VISIT MOD MDM: CPT

## 2023-08-04 PROCEDURE — 93005 ELECTROCARDIOGRAM TRACING: CPT

## 2023-08-04 PROCEDURE — 96361 HYDRATE IV INFUSION ADD-ON: CPT

## 2023-08-04 PROCEDURE — 96374 THER/PROPH/DIAG INJ IV PUSH: CPT

## 2023-08-04 PROCEDURE — 85025 COMPLETE CBC W/AUTO DIFF WBC: CPT | Performed by: PHYSICIAN ASSISTANT

## 2023-08-04 RX ORDER — ONDANSETRON 4 MG/1
4 TABLET, FILM COATED ORAL EVERY 6 HOURS PRN
Qty: 15 TABLET | Refills: 0 | Status: SHIPPED | OUTPATIENT
Start: 2023-08-04

## 2023-08-04 RX ORDER — OLANZAPINE 2.5 MG/1
5 TABLET ORAL ONCE
Status: COMPLETED | OUTPATIENT
Start: 2023-08-04 | End: 2023-08-04

## 2023-08-04 RX ORDER — ONDANSETRON 2 MG/ML
4 INJECTION INTRAMUSCULAR; INTRAVENOUS ONCE
Status: COMPLETED | OUTPATIENT
Start: 2023-08-04 | End: 2023-08-04

## 2023-08-04 RX ADMIN — ONDANSETRON 4 MG: 2 INJECTION INTRAMUSCULAR; INTRAVENOUS at 10:43

## 2023-08-04 RX ADMIN — SODIUM CHLORIDE 1000 ML: 0.9 INJECTION, SOLUTION INTRAVENOUS at 10:44

## 2023-08-04 RX ADMIN — OLANZAPINE 5 MG: 2.5 TABLET, FILM COATED ORAL at 10:42

## 2023-08-04 NOTE — ED PROVIDER NOTES
History  Chief Complaint   Patient presents with   • Vomiting     Pt arrives via EMS c/o vomiting x 3 days, +diarrhea     15-year-old male with past medical history significant for hypertension, hyperlipidemia, diabetes presents emergency department with chief complaint of nausea, vomiting, and tingling in left hand. Symptoms reportedly started 3 days ago. Location of symptoms primarily reported as the stomach. Quality is described as multiple episodes of nonbloody nonbilious vomiting. Severity reported as mild to moderate. Associated symptoms include watery nonbloody diarrhea, denies fevers. Denies abdominal pain,  Denies weight loss. Denies chest pain or shortness of breath. Denies syncope. Denies rash. Modifiers:  Patient reports eating exacerbates nausea/vomiting symptoms. He states he had initially been constipated so started taking dulcolax and bisocodyl after which he states he started having diarrhea. Patient talks about multiple issues with his right knee over the past year. He follows with Dr. Emeterio Prasad and describes having  surgery and having to go to rehab facilities and his frustration over his progress. He states he had an abnormality on his EKG prior to surgery and becomes tearful/crying when talking about how overwhelmed he is with his ongoing medical issues. History provided by:  Patient and EMS personnel   used: No        Prior to Admission Medications   Prescriptions Last Dose Informant Patient Reported? Taking? BD PEN NEEDLE DEV U/F 32G X 4 MM MISC  Self No No   Sig: Inject under the skin 4 (four) times a day   Blood Glucose Monitoring Suppl (ONE TOUCH ULTRA 2) w/Device KIT  Self No No   Sig: Use 2 (two) times a day   Blood Glucose Monitoring Suppl (OneTouch Verio Reflect) w/Device KIT  Self No No   Sig: Check blood sugars twice daily. Please substitute with appropriate alternative as covered by patient's insurance.  Dx: E11.65   Insulin Glargine Solostar (Lantus SoloStar) 100 UNIT/ML SOPN   No No   Sig: Inject 45 units bid   OneTouch Delica Lancets 59U MISC  Self No No   Sig: Check blood sugars twice daily. Please substitute with appropriate alternative as covered by patient's insurance. Dx: E11.65   acetaminophen (TYLENOL) 325 mg tablet   No No   Sig: Take 2 tablets (650 mg total) by mouth every 8 (eight) hours as needed for mild pain   atorvastatin (LIPITOR) 40 mg tablet  Self No No   Sig: Take 1 tablet (40 mg total) by mouth daily with dinner   Patient taking differently: Take 40 mg by mouth every morning   clopidogrel (PLAVIX) 75 mg tablet   No No   Sig: Take 1 tablet (75 mg total) by mouth daily Do not start before June 12, 2023. escitalopram (LEXAPRO) 5 mg tablet   No No   Sig: TAKE 1 TABLET DAILY   ferrous sulfate 325 (65 Fe) mg tablet   No No   Sig: Take 1 tablet (325 mg total) by mouth daily at bedtime   glucose blood (OneTouch Verio) test strip  Self No No   Sig: Check blood sugars twice daily. Please substitute with appropriate alternative as covered by patient's insurance. Dx: E11.65   insulin glulisine (Apidra) 100 units/mL injection   No No   Sig: Inject 30 units tid with meals   levothyroxine 175 mcg tablet   No No   Sig: Take 1 tablet (175 mcg total) by mouth daily in the early morning   omeprazole (PriLOSEC) 40 MG capsule  Self No No   Sig: TAKE 1 CAPSULE DAILY   Patient taking differently: 40 mg every morning   rivaroxaban (XARELTO) 10 mg tablet   No No   Sig: Take 1 tablet (10 mg total) by mouth daily with breakfast for 12 days Do not start before May 31, 2023.    verapamil (VERELAN PM) 360 MG 24 hr capsule  Self No No   Sig: TAKE 1 CAPSULE DAILY AT BEDTIME   zolpidem (AMBIEN) 10 mg tablet   No No   Sig: Take 1 tablet (10 mg total) by mouth daily at bedtime as needed for sleep      Facility-Administered Medications: None       Past Medical History:   Diagnosis Date   • Broken internal right knee prosthesis ([address]) 01/2023   • Chronic kidney disease • Colon polyp    • Constipation 03/09/2023   • Diabetes mellitus ([address])    • Disease of thyroid gland    • GERD (gastroesophageal reflux disease)    • HHS vs DKA 11/16/2018   • Hyperlipidemia    • Hypertension    • Thyroid cancer [facility])        Past Surgical History:   Procedure Laterality Date   • COLONOSCOPY     • FOOT AMPUTATION Right    • FOOT SURGERY Right 2014   • IR AORTAGRAM WITH RUN-OFF  08/06/2020   • IR TUNNELED CENTRAL LINE PLACEMENT  09/08/2020   • IR TUNNELED CENTRAL LINE REMOVAL  09/28/2020   • CO AMPUTATION FOOT TRANSMETARSAL Right 09/03/2020    Procedure: AMPUTATION TRANSMETATARSAL (TMA);  Surgeon: Deloris Chao DPM;  Location: [facility];  Service: Podiatry   • CO OPEN [place] UNICONDYLAR Right 5/1/2023    Procedure: OPEN REDUCTION W/ INTERNAL FIXATION (ORIF) RIGHT TIBIAL PLATEAU NONUNION;  Surgeon: May Meadows MD;  Location: [facility];  Service: Orthopedics   • CO THYROIDECTOMY TOTAL/COMPLETE N/A 02/03/2021    Procedure: TOTAL THYROIDECTOMY WITH NIMS MONITORING;  Surgeon: lOga Curiel MD;  Location: [facility];  Service: ENT   • TOE AMPUTATION Right 08/11/2020    Procedure: AMPUTATION TOE;  Surgeon: Deloris Chao DPM;  [facility]: [facility];  Service: Podiatry   • US GUIDED THYROID BIOPSY  07/02/2020       Family History   Problem Relation Age of Onset   • Cancer Mother    • Hypertension Father      I have reviewed and agree with the history as documented. E-Cigarette/Vaping   • E-Cigarette Use Never User      E-Cigarette/Vaping Substances   • Nicotine No    • THC No    • CBD No    • Flavoring No    • Other No    • Unknown No      Social History     Tobacco Use   • Smoking status: Never   • Smokeless tobacco: Never   Vaping Use   • Vaping Use: Never used   Substance Use Topics   • Alcohol use: Not Currently     Comment: quit 1-2023   • Drug use: Not Currently     Types: Marijuana       Review of Systems   Constitutional: Negative for chills, diaphoresis and fever. Respiratory: Negative for chest tightness, shortness of breath and stridor. Cardiovascular: Negative for chest pain. Gastrointestinal: Positive for diarrhea, nausea and vomiting. Negative for abdominal pain. Musculoskeletal: Positive for arthralgias. Negative for back pain, gait problem, neck pain and neck stiffness. Skin: Negative for rash. Neurological: Negative for seizures, syncope, facial asymmetry, weakness, light-headedness, numbness and headaches. Psychiatric/Behavioral: The patient is nervous/anxious. All other systems reviewed and are negative. Physical Exam  Physical Exam  Vitals and nursing note reviewed. Constitutional:       General: He is not in acute distress. Appearance: Normal appearance. HENT:      Head: Normocephalic and atraumatic. Right Ear: External ear normal.      Left Ear: External ear normal.      Nose: Nose normal.      Mouth/Throat:      Mouth: Mucous membranes are moist.   Eyes:      General: No scleral icterus. Right eye: No discharge. Left eye: No discharge. Cardiovascular:      Rate and Rhythm: Normal rate. Pulses: Normal pulses. Pulmonary:      Effort: Pulmonary effort is normal.      Breath sounds: Normal breath sounds. Abdominal:      Palpations: There is no mass. Tenderness: There is no abdominal tenderness. There is no guarding or rebound. Musculoskeletal:         General: No tenderness, deformity or signs of injury. Normal range of motion. Cervical back: Normal range of motion and neck supple. Skin:     General: Skin is dry. Capillary Refill: Capillary refill takes less than 2 seconds. Coloration: Skin is not jaundiced. Findings: No erythema or rash. Neurological:      General: No focal deficit present. Mental Status: He is alert and oriented to person, place, and time. Mental status is at baseline. Sensory: No sensory deficit. Motor: No weakness.       Gait: Gait normal. Psychiatric:         Mood and Affect: Mood is anxious.          Behavior: Behavior normal.         Cognition and Memory: Cognition and memory normal.         Vital Signs  ED Triage Vitals [08/04/23 1001]   Temperature Pulse Respirations Blood Pressure SpO2   97.6 °F (36.4 °C) 87 22 143/99 99 %      Temp Source Heart Rate Source Patient Position - Orthostatic VS BP Location FiO2 (%)   Oral Monitor Sitting Right arm --      Pain Score       --           Vitals:    08/04/23 1001 08/04/23 1130 08/04/23 1200 08/04/23 1300   BP: 143/99 129/77 132/95 135/85   Pulse: 87 91 87 92   Patient Position - Orthostatic VS: Sitting            Visual Acuity  Visual Acuity    Flowsheet Row Most Recent Value   L Pupil Size (mm) 3   R Pupil Size (mm) 3          ED Medications  Medications   sodium chloride 0.9 % bolus 1,000 mL (0 mL Intravenous Stopped 8/4/23 1315)   OLANZapine (ZyPREXA) tablet 5 mg (5 mg Oral Given 8/4/23 1042)   ondansetron (ZOFRAN) injection 4 mg (4 mg Intravenous Given 8/4/23 1043)       Diagnostic Studies  Results Reviewed     Procedure Component Value Units Date/Time    Basic metabolic panel [[id number]]  (Abnormal) Collected: 08/04/23 1041    Lab Status: Final result Specimen: Blood from Arm, Left Updated: 08/04/23 1119     Sodium 136 mmol/L      Potassium 3.3 mmol/L      Chloride 102 mmol/L      CO2 21 mmol/L      ANION GAP 13 mmol/L      BUN 26 mg/dL      Creatinine 0.98 mg/dL      Glucose 101 mg/dL      Calcium 10.5 mg/dL      eGFR 83 ml/min/1.73sq m     Narrative:      [place] guidelines for Chronic Kidney Disease (CKD):   •  Stage 1 with normal or high GFR (GFR > 90 mL/min/1.73 square meters)  •  Stage 2 Mild CKD (GFR = 60-89 mL/min/1.73 square meters)  •  Stage 3A Moderate CKD (GFR = 45-59 mL/min/1.73 square meters)  •  Stage 3B Moderate CKD (GFR = 30-44 mL/min/1.73 square meters)  •  Stage 4 Severe CKD (GFR = 15-29 mL/min/1.73 square meters)  •  Stage 5 End Stage CKD (GFR <15 mL/min/1.73 square meters)  Note: GFR calculation is accurate only with a steady state creatinine    CBC and differential [[id number]] Collected: 08/04/23 1041    Lab Status: Final result Specimen: Blood from Arm, Left Updated: 08/04/23 1059     WBC 9.69 Thousand/uL      RBC 4.88 Million/uL      Hemoglobin 14.0 g/dL      Hematocrit 41.7 %      MCV 86 fL      MCH 28.7 pg      MCHC 33.6 g/dL      RDW 14.2 %      MPV 11.8 fL      Platelets 532 Thousands/uL      nRBC 0 /100 WBCs      Neutrophils Relative 63 %      Immat GRANS % 0 %      Lymphocytes Relative 25 %      Monocytes Relative 10 %      Eosinophils Relative 1 %      Basophils Relative 1 %      Neutrophils Absolute 6.13 Thousands/µL      Immature Grans Absolute 0.03 Thousand/uL      Lymphocytes Absolute 2.38 Thousands/µL      Monocytes Absolute 0.99 Thousand/µL      Eosinophils Absolute 0.10 Thousand/µL      Basophils Absolute 0.06 Thousands/µL                  No orders to display              Procedures  ECG 12 Lead Documentation Only    Date/Time: 8/4/2023 10:46 AM    Performed by: Swati Katz PA-C  Authorized by: Swati Katz PA-C    Indications / Diagnosis:  Left hand tingling  ECG reviewed by me, the ED Provider: yes    Patient location:  ED  Previous ECG:     Previous ECG:  Unavailable    Comparison to cardiac monitor: Yes    Interpretation:     Interpretation: normal    Rate:     ECG rate:  86    ECG rate assessment: normal    Rhythm:     Rhythm: sinus rhythm    Ectopy:     Ectopy: none    QRS:     QRS axis:  Normal    QRS intervals:  Normal  Conduction:     Conduction: normal    ST segments:     ST segments:  Normal  T waves:     T waves: normal               ED Course                               SBIRT 20yo+    Flowsheet Row Most Recent Value   Initial Alcohol Screen: US AUDIT-C     1. How often do you have a drink containing alcohol? 0 Filed at: 08/04/2023 1003   2.  How many drinks containing alcohol do you have on a typical day you are drinking? 0 Filed at: 08/04/2023 1003   3a. Male UNDER 65: How often do you have five or more drinks on one occasion? 0 Filed at: 08/04/2023 1003   Audit-C Score 0 Filed at: 08/04/2023 1003   WILLIAM: How many times in the past year have you. .. Used an illegal drug or used a prescription medication for non-medical reasons? Never Filed at: 08/04/2023 1003                    Medical Decision Making  ED Coarse: 59year old male presents with nausea and vomiting x 3 days. Diarrhea after taking laxatives. Non specific tingling of left hand without motor weakness and anxiety over his ongoing medical conditions. Reports he stopped lexapro a while ago and restarted it 3 days for for his anxiety symptoms. On exam, anxious nontoxic appearing male, no acute distress, nontoxic appearance, vitals mild tachypnea. otherwise Unremarkable, awake alert oriented, RRR, B CTA, NDNT abd +BS x 4. normal neuro exam without focal deficit, remainder of exam unremarkable as above. DDX:  ddx includes viral syndrome, gastritis, pancreatitis, dehydration, acute kidney injury, electrolyte abnormality, PUD, anxiety, cervical radiculopathy,  Consider but doubt ACS, CVA or vascular occlusion      Initial ED Plan: EKG to evaluate for abnormalities referenced by patient. BMP to evaluate renal function, electrolytes. IV fluids and IV zofran to treat vomiting. zyprexa for anxiolysis. MDM:  I have reviewed the patient's vital signs, nursing notes, and other relevant ancillary testing/information. I have had a detailed discussion with the patient regarding the historical points, examination findings, and any diagnostic results    Results:  Reviewed at bedside: BUN 26 mildly elevated. Creatinine 0.98, no MARKELL. K 3.3, mildly low secondary to GI losses. My ekg interpretation. Normal sinus rhythm. No STEMI. Re-evaluation:  Patient improved after IV fluids and zofran given in ED. Vomiting resolved.   Anxiety improved after zyprexa. Reassurance given regarding EKG without acute abnormalities. No indication for further evaluation. Vital signs improved, patient stable for discharge and outpatient treatment. ED Final Assessment 1) nausea and vomiting - improved with IV zofran and IV fluids. 2) mild dehydration secondary to vomiting/diarrhea - improved with IV Fluids and zofran. 3) mild hypokalemia - secondary to GI losses - replaced with IV Fluids and cessation of vomiting. 4) Anxiety - secondary to chronic medical conditions. Improved after zyprexa and reassurance of normal EKG during ED visits. 5) tingling left hand - cinically normal motor, neurovascular exam - improved after zyprexa - suspect secondary to anxiety. Stable for discharge. Zofran ODT for n/v  Encourage fluids  [place] diet, advance as tolerated  F/u pcp for further management and discussion of anxiety treatment. F/u with orthopedics for ongoing management of chronic knee issues. Return to ED precautions given. Amount and/or Complexity of Data Reviewed  Labs: ordered. Risk  Prescription drug management. Disposition  Final diagnoses:   Nausea and vomiting   Anxiety     Time reflects when diagnosis was documented in both MDM as applicable and the Disposition within this note     Time User Action Codes Description Comment    8/4/2023 12:49 PM Den Gannon Add [R11.2] Nausea and vomiting     8/4/2023 12:49 PM Den Gannon Add [F41.9] Anxiety       ED Disposition     ED Disposition   Discharge    Condition   Stable    Date/Time   Fri Aug 4, 2023 12:44 PM    Comment   [place] discharge to home/self care.                Follow-up Information     Follow up With Specialties Details Why Contact Info Additional Information    Judie Soto MD Family Medicine Call in 2 days for further evaluation of symptoms [address] [facility] [address] Emergency Department Emergency Medicine Go to  If symptoms worsen [address] [facility] Emergency Department, [place], [place], [address] [place] [address] [place], MD Orthopedic Surgery Go in 2 weeks for further evaluation of symptoms [facility]  [address]  [facility] [address]  [phone]             Discharge Medication List as of 8/4/2023 12:50 PM      START taking these medications    Details   ondansetron (ZOFRAN) 4 mg tablet Take 1 tablet (4 mg total) by mouth every 6 (six) hours as needed for nausea or vomiting, Starting Fri 8/4/2023, Normal         CONTINUE these medications which have NOT CHANGED    Details   acetaminophen (TYLENOL) 325 mg tablet Take 2 tablets (650 mg total) by mouth every 8 (eight) hours as needed for mild pain, Starting Wed 5/10/2023, Normal      atorvastatin (LIPITOR) 40 mg tablet Take 1 tablet (40 mg total) by mouth daily with dinner, Starting Tue 11/15/2022, Until Mon 5/1/2023, Normal      BD PEN NEEDLE DEV U/F 32G X 4 MM MISC Inject under the skin 4 (four) times a day, Starting Thu 1/24/2019, Normal      !! Blood Glucose Monitoring Suppl (ONE TOUCH ULTRA 2) w/Device KIT Use 2 (two) times a day, Starting Mon 3/20/2023, Normal      !! Blood Glucose Monitoring Suppl (OneTouch Verio Reflect) w/Device KIT Check blood sugars twice daily. Please substitute with appropriate alternative as covered by patient's insurance. Dx: E11.65, Normal      clopidogrel (PLAVIX) 75 mg tablet Take 1 tablet (75 mg total) by mouth daily Do not start before June 12, 2023., Starting Mon 6/12/2023, No Print      escitalopram (LEXAPRO) 5 mg tablet TAKE 1 TABLET DAILY, Starting Thu 8/3/2023, Normal      ferrous sulfate 325 (65 Fe) mg tablet Take 1 tablet (325 mg total) by mouth daily at bedtime, Starting Tue 5/30/2023, Normal      glucose blood (OneTouch Verio) test strip Check blood sugars twice daily.  Please substitute with appropriate alternative as covered by patient's insurance. Dx: E11.65, Normal      Insulin Glargine Solostar (Lantus SoloStar) 100 UNIT/ML SOPN Inject 45 units bid, No Print      insulin glulisine (Apidra) 100 units/mL injection Inject 30 units tid with meals, No Print      levothyroxine 175 mcg tablet Take 1 tablet (175 mcg total) by mouth daily in the early morning, Starting Tue 5/30/2023, Normal      omeprazole (PriLOSEC) 40 MG capsule TAKE 1 CAPSULE DAILY, Normal      OneTouch Delica Lancets 82A MISC Check blood sugars twice daily. Please substitute with appropriate alternative as covered by patient's insurance. Dx: E11.65, Normal      rivaroxaban (XARELTO) 10 mg tablet Take 1 tablet (10 mg total) by mouth daily with breakfast for 12 days Do not start before May 31, 2023., Starting Wed 5/31/2023, Until Mon 6/12/2023, Normal      verapamil (VERELAN PM) 360 MG 24 hr capsule TAKE 1 CAPSULE DAILY AT BEDTIME, Normal      zolpidem (AMBIEN) 10 mg tablet Take 1 tablet (10 mg total) by mouth daily at bedtime as needed for sleep, Starting Mon 5/1/2023, Until Sat 10/28/2023 at 2359, Normal       !! - Potential duplicate medications found. Please discuss with provider. No discharge procedures on file.     PDMP Review       Value Time User    PDMP Reviewed  Yes 5/30/2023  9:11 AM Liam Artis PA-C          ED Provider  Electronically Signed by           Romayne Livings, PA-C  08/05/23 0113

## 2023-08-07 ENCOUNTER — VBI (OUTPATIENT)
Dept: FAMILY MEDICINE CLINIC | Facility: CLINIC | Age: 58
End: 2023-08-07

## 2023-08-07 NOTE — TELEPHONE ENCOUNTER
08/07/23 10:08 AM    Patient contacted post ED visit, [facility] department spoke with patient/caregiver and outreach was successful. Thank you.   Nury Anguiano MA  [facility]

## 2023-08-15 ENCOUNTER — APPOINTMENT (EMERGENCY)
Dept: RADIOLOGY | Facility: HOSPITAL | Age: 58
End: 2023-08-15
Payer: MEDICARE

## 2023-08-15 ENCOUNTER — HOSPITAL ENCOUNTER (EMERGENCY)
Facility: HOSPITAL | Age: 58
Discharge: HOME | End: 2023-08-16
Attending: EMERGENCY MEDICINE
Payer: MEDICARE

## 2023-08-15 ENCOUNTER — APPOINTMENT (EMERGENCY)
Dept: CT IMAGING | Facility: HOSPITAL | Age: 58
End: 2023-08-15
Payer: MEDICARE

## 2023-08-15 VITALS
SYSTOLIC BLOOD PRESSURE: 139 MMHG | OXYGEN SATURATION: 96 % | TEMPERATURE: 98.2 F | WEIGHT: 231.04 LBS | BODY MASS INDEX: 28.13 KG/M2 | HEIGHT: 76 IN | DIASTOLIC BLOOD PRESSURE: 100 MMHG | HEART RATE: 90 BPM | RESPIRATION RATE: 22 BRPM

## 2023-08-15 DIAGNOSIS — F32.A DEPRESSION WITH SUICIDAL IDEATION: Primary | ICD-10-CM

## 2023-08-15 DIAGNOSIS — R45.851 DEPRESSION WITH SUICIDAL IDEATION: Primary | ICD-10-CM

## 2023-08-15 DIAGNOSIS — K59.00 CONSTIPATION: ICD-10-CM

## 2023-08-15 DIAGNOSIS — R19.7 DIARRHEA: ICD-10-CM

## 2023-08-15 DIAGNOSIS — R10.30 LOWER ABDOMINAL PAIN: ICD-10-CM

## 2023-08-15 DIAGNOSIS — R11.0 NAUSEA: ICD-10-CM

## 2023-08-15 LAB
2HR DELTA HS TROPONIN: 1 NG/L
ALBUMIN SERPL BCP-MCNC: 4.4 G/DL (ref 3.5–5)
ALP SERPL-CCNC: 157 U/L (ref 34–104)
ALT SERPL W P-5'-P-CCNC: 30 U/L (ref 7–52)
AMPHETAMINES SERPL QL SCN: NEGATIVE
ANION GAP SERPL CALCULATED.3IONS-SCNC: 11 MMOL/L
APTT PPP: 28 SECONDS (ref 23–37)
AST SERPL W P-5'-P-CCNC: 31 U/L (ref 13–39)
BACTERIA UR QL AUTO: ABNORMAL /HPF
BARBITURATES UR QL: NEGATIVE
BASOPHILS # BLD AUTO: 0.06 THOUSANDS/ÂΜL (ref 0–0.1)
BASOPHILS NFR BLD AUTO: 1 % (ref 0–1)
BENZODIAZ UR QL: NEGATIVE
BILIRUB DIRECT SERPL-MCNC: 0.16 MG/DL (ref 0–0.2)
BILIRUB SERPL-MCNC: 0.73 MG/DL (ref 0.2–1)
BILIRUB UR QL STRIP: NEGATIVE
BUN SERPL-MCNC: 22 MG/DL (ref 5–25)
CALCIUM SERPL-MCNC: 10 MG/DL (ref 8.4–10.2)
CARDIAC TROPONIN I PNL SERPL HS: 5 NG/L
CARDIAC TROPONIN I PNL SERPL HS: 6 NG/L
CHLORIDE SERPL-SCNC: 104 MMOL/L (ref 96–108)
CLARITY UR: CLEAR
CO2 SERPL-SCNC: 21 MMOL/L (ref 21–32)
COCAINE UR QL: NEGATIVE
COLOR UR: YELLOW
CREAT SERPL-MCNC: 0.93 MG/DL (ref 0.6–1.3)
EOSINOPHIL # BLD AUTO: 0.08 THOUSAND/ÂΜL (ref 0–0.61)
EOSINOPHIL NFR BLD AUTO: 1 % (ref 0–6)
ERYTHROCYTE [DISTWIDTH] IN BLOOD BY AUTOMATED COUNT: 14.1 % (ref 11.6–15.1)
ETHANOL SERPL-MCNC: <10 MG/DL
FLUAV RNA RESP QL NAA+PROBE: NEGATIVE
FLUBV RNA RESP QL NAA+PROBE: NEGATIVE
GFR SERPL CREATININE-BSD FRML MDRD: 88 ML/MIN/1.73SQ M
GLUCOSE SERPL-MCNC: 121 MG/DL (ref 65–140)
GLUCOSE UR STRIP-MCNC: NEGATIVE MG/DL
HCT VFR BLD AUTO: 38.9 % (ref 36.5–49.3)
HGB BLD-MCNC: 13.2 G/DL (ref 12–17)
HGB UR QL STRIP.AUTO: ABNORMAL
IMM GRANULOCYTES # BLD AUTO: 0.03 THOUSAND/UL (ref 0–0.2)
IMM GRANULOCYTES NFR BLD AUTO: 0 % (ref 0–2)
INR PPP: 1.09 (ref 0.84–1.19)
KETONES UR STRIP-MCNC: ABNORMAL MG/DL
LACTATE SERPL-SCNC: 1.9 MMOL/L (ref 0.5–2)
LEUKOCYTE ESTERASE UR QL STRIP: NEGATIVE
LIPASE SERPL-CCNC: 20 U/L (ref 11–82)
LYMPHOCYTES # BLD AUTO: 2.82 THOUSANDS/ÂΜL (ref 0.6–4.47)
LYMPHOCYTES NFR BLD AUTO: 23 % (ref 14–44)
MAGNESIUM SERPL-MCNC: 1.8 MG/DL (ref 1.9–2.7)
MCH RBC QN AUTO: 29.1 PG (ref 26.8–34.3)
MCHC RBC AUTO-ENTMCNC: 33.9 G/DL (ref 31.4–37.4)
MCV RBC AUTO: 86 FL (ref 82–98)
METHADONE UR QL: NEGATIVE
MONOCYTES # BLD AUTO: 1.33 THOUSAND/ÂΜL (ref 0.17–1.22)
MONOCYTES NFR BLD AUTO: 11 % (ref 4–12)
MUCOUS THREADS UR QL AUTO: ABNORMAL
NEUTROPHILS # BLD AUTO: 8.23 THOUSANDS/ÂΜL (ref 1.85–7.62)
NEUTS SEG NFR BLD AUTO: 64 % (ref 43–75)
NITRITE UR QL STRIP: NEGATIVE
NON-SQ EPI CELLS URNS QL MICRO: ABNORMAL /HPF
NRBC BLD AUTO-RTO: 0 /100 WBCS
OPIATES UR QL SCN: NEGATIVE
OXYCODONE+OXYMORPHONE UR QL SCN: NEGATIVE
PCP UR QL: NEGATIVE
PH UR STRIP.AUTO: 6 [PH]
PLATELET # BLD AUTO: 270 THOUSANDS/UL (ref 149–390)
PMV BLD AUTO: 11.4 FL (ref 8.9–12.7)
POTASSIUM SERPL-SCNC: 3.8 MMOL/L (ref 3.5–5.3)
PROT SERPL-MCNC: 8.3 G/DL (ref 6.4–8.4)
PROT UR STRIP-MCNC: ABNORMAL MG/DL
PROTHROMBIN TIME: 13.9 SECONDS (ref 11.6–14.5)
RBC # BLD AUTO: 4.54 MILLION/UL (ref 3.88–5.62)
RBC #/AREA URNS AUTO: ABNORMAL /HPF
RSV RNA RESP QL NAA+PROBE: NEGATIVE
SARS-COV-2 RNA RESP QL NAA+PROBE: NEGATIVE
SODIUM SERPL-SCNC: 136 MMOL/L (ref 135–147)
SP GR UR STRIP.AUTO: >=1.05 (ref 1–1.03)
THC UR QL: POSITIVE
TSH SERPL DL<=0.05 MIU/L-ACNC: 4.36 UIU/ML (ref 0.45–4.5)
UROBILINOGEN UR STRIP-ACNC: <2 MG/DL
WBC # BLD AUTO: 12.55 THOUSAND/UL (ref 4.31–10.16)
WBC #/AREA URNS AUTO: ABNORMAL /HPF

## 2023-08-15 PROCEDURE — 83605 ASSAY OF LACTIC ACID: CPT | Performed by: EMERGENCY MEDICINE

## 2023-08-15 PROCEDURE — 83735 ASSAY OF MAGNESIUM: CPT | Performed by: EMERGENCY MEDICINE

## 2023-08-15 PROCEDURE — 36415 COLL VENOUS BLD VENIPUNCTURE: CPT | Performed by: EMERGENCY MEDICINE

## 2023-08-15 PROCEDURE — 82077 ASSAY SPEC XCP UR&BREATH IA: CPT | Performed by: EMERGENCY MEDICINE

## 2023-08-15 PROCEDURE — 74177 CT ABD & PELVIS W/CONTRAST: CPT

## 2023-08-15 PROCEDURE — 93005 ELECTROCARDIOGRAM TRACING: CPT

## 2023-08-15 PROCEDURE — 99285 EMERGENCY DEPT VISIT HI MDM: CPT | Performed by: EMERGENCY MEDICINE

## 2023-08-15 PROCEDURE — 96374 THER/PROPH/DIAG INJ IV PUSH: CPT

## 2023-08-15 PROCEDURE — G1004 CDSM NDSC: HCPCS

## 2023-08-15 PROCEDURE — 96361 HYDRATE IV INFUSION ADD-ON: CPT

## 2023-08-15 PROCEDURE — 99285 EMERGENCY DEPT VISIT HI MDM: CPT

## 2023-08-15 PROCEDURE — 85025 COMPLETE CBC W/AUTO DIFF WBC: CPT | Performed by: EMERGENCY MEDICINE

## 2023-08-15 PROCEDURE — 80076 HEPATIC FUNCTION PANEL: CPT | Performed by: EMERGENCY MEDICINE

## 2023-08-15 PROCEDURE — 70450 CT HEAD/BRAIN W/O DYE: CPT

## 2023-08-15 PROCEDURE — 71045 X-RAY EXAM CHEST 1 VIEW: CPT

## 2023-08-15 PROCEDURE — 84484 ASSAY OF TROPONIN QUANT: CPT | Performed by: EMERGENCY MEDICINE

## 2023-08-15 PROCEDURE — 80048 BASIC METABOLIC PNL TOTAL CA: CPT | Performed by: EMERGENCY MEDICINE

## 2023-08-15 PROCEDURE — 85730 THROMBOPLASTIN TIME PARTIAL: CPT | Performed by: EMERGENCY MEDICINE

## 2023-08-15 PROCEDURE — 0241U HB NFCT DS VIR RESP RNA 4 TRGT: CPT | Performed by: EMERGENCY MEDICINE

## 2023-08-15 PROCEDURE — 80307 DRUG TEST PRSMV CHEM ANLYZR: CPT | Performed by: EMERGENCY MEDICINE

## 2023-08-15 PROCEDURE — 81001 URINALYSIS AUTO W/SCOPE: CPT | Performed by: EMERGENCY MEDICINE

## 2023-08-15 PROCEDURE — 85610 PROTHROMBIN TIME: CPT | Performed by: EMERGENCY MEDICINE

## 2023-08-15 PROCEDURE — 84443 ASSAY THYROID STIM HORMONE: CPT | Performed by: EMERGENCY MEDICINE

## 2023-08-15 PROCEDURE — 83690 ASSAY OF LIPASE: CPT | Performed by: EMERGENCY MEDICINE

## 2023-08-15 RX ORDER — LORAZEPAM 0.5 MG/1
0.5 TABLET ORAL ONCE
Status: COMPLETED | OUTPATIENT
Start: 2023-08-15 | End: 2023-08-15

## 2023-08-15 RX ORDER — LANOLIN ALCOHOL/MO/W.PET/CERES
400 CREAM (GRAM) TOPICAL ONCE
Status: COMPLETED | OUTPATIENT
Start: 2023-08-15 | End: 2023-08-15

## 2023-08-15 RX ORDER — OLANZAPINE 5 MG/1
5 TABLET, ORALLY DISINTEGRATING ORAL ONCE
Status: COMPLETED | OUTPATIENT
Start: 2023-08-15 | End: 2023-08-15

## 2023-08-15 RX ORDER — ONDANSETRON 2 MG/ML
4 INJECTION INTRAMUSCULAR; INTRAVENOUS ONCE
Status: COMPLETED | OUTPATIENT
Start: 2023-08-15 | End: 2023-08-15

## 2023-08-15 RX ADMIN — OLANZAPINE 5 MG: 5 TABLET, ORALLY DISINTEGRATING ORAL at 21:02

## 2023-08-15 RX ADMIN — ONDANSETRON 4 MG: 2 INJECTION INTRAMUSCULAR; INTRAVENOUS at 17:13

## 2023-08-15 RX ADMIN — SODIUM CHLORIDE 1000 ML: 0.9 INJECTION, SOLUTION INTRAVENOUS at 17:11

## 2023-08-15 RX ADMIN — Medication 400 MG: at 18:06

## 2023-08-15 RX ADMIN — IOHEXOL 100 ML: 350 INJECTION, SOLUTION INTRAVENOUS at 18:04

## 2023-08-15 RX ADMIN — LORAZEPAM 0.5 MG: 0.5 TABLET ORAL at 17:13

## 2023-08-15 NOTE — ED PROVIDER NOTES
History  Chief Complaint   Patient presents with   • Abdominal Pain     Lower Abdominal pain since 4 days ,constipated x1 week and took miralax 4 days ago and diarrhea since. States also his urine with a bad odor. Tearful states he feels bad with nausea     Patient is a 76-year-old male with past medical history of insulin-dependent diabetes, hypertension, hyperlipidemia, GERD, thyroid cancer status post total thyroidectomy and currently on levothyroxine, chronic kidney disease, right metatarsal amputation, right tibial plateau fracture status postsurgical repair, presents to the emergency department complaining of lower abdominal pain. Patient states that for the past 1 week he has been very constipated, straining with bowel movements and only passing very hard small round brown stool at a time. He spoke with one of his physician friends on Saturday who encouraged him to take MiraLAX and advised him to take it every 2 hours until he has a BM, which he did x 4 doses on Saturday. He reports he passed hard stool followed by diarrhea after that and has been having diarrhea since then. He has not had the Miralax since Saturday. Patient reports nausea and vomiting since early last week and still is dry-heaving this morning. He does admit to recently stopping his Lexapro because he read it could cause constipation and is unsure if he could be having withdrawal symptoms. He also reports diffuse lower abdominal pain since Saturday. Patient states he is only urinated a very small amount in the past several days and his urine is very dark in color. He denies any dysuria, gross hematuria or any associated flank pain. He denies any fevers or chills. He does report feeling lightheaded on and off. Patient also states that for several weeks to months he has been having intermittent paresthesia involving the left hand. His physician friend told him he might have had a TIA and patient is very concerned about this.   He denies any associated weakness and states that the paresthesia is mostly localized to the hand and proximal forearm and does not involve the entire left upper extremity. Denies any lower extremity weakness or paresthesia or other strokelike symptoms. Denies any headache, vertigo, change in vision, neck pain or stiffness, cough, URI symptoms, chest pain, shortness of breath, palpitations, abdominal distention, hematemesis, blood per rectum, melena, skin rash or color change. Patient does report he just started physical therapy for his right knee yesterday which is the first time that he has been walking on his knee. He states it is slightly more swollen but in general, the swelling has been fairly stable since his surgery. He denies any difficulty bending the knee or significant pain in the knee joint. Patient reports feeling very depressed and anxious about his symptoms. He is very tearful throughout history and physical exam.  According to records, patient was seen here on 8/4 for similar symptoms of nausea, vomiting. He did not have any CT imaging at that time but labs unremarkable. He was noted to be tearful and anxious at that time as well. History provided by:  Patient   used: No    Abdominal Pain  Associated symptoms: diarrhea, nausea and vomiting    Associated symptoms: no chest pain, no chills, no constipation, no cough, no dysuria, no fever, no hematuria, no shortness of breath and no sore throat        Prior to Admission Medications   Prescriptions Last Dose Informant Patient Reported? Taking? BD PEN NEEDLE DEV U/F 32G X 4 MM MISC  Self No No   Sig: Inject under the skin 4 (four) times a day   Blood Glucose Monitoring Suppl (ONE TOUCH ULTRA 2) w/Device KIT  Self No No   Sig: Use 2 (two) times a day   Blood Glucose Monitoring Suppl (OneTouch Verio Reflect) w/Device KIT  Self No No   Sig: Check blood sugars twice daily.  Please substitute with appropriate alternative as covered by patient's insurance. Dx: E11.65   Insulin Glargine Solostar (Lantus SoloStar) 100 UNIT/ML SOPN   No No   Sig: Inject 45 units bid   OneTouch Delica Lancets 66W MISC  Self No No   Sig: Check blood sugars twice daily. Please substitute with appropriate alternative as covered by patient's insurance. Dx: E11.65   acetaminophen (TYLENOL) 325 mg tablet   No No   Sig: Take 2 tablets (650 mg total) by mouth every 8 (eight) hours as needed for mild pain   atorvastatin (LIPITOR) 40 mg tablet  Self No No   Sig: Take 1 tablet (40 mg total) by mouth daily with dinner   Patient taking differently: Take 40 mg by mouth every morning   clopidogrel (PLAVIX) 75 mg tablet   No No   Sig: Take 1 tablet (75 mg total) by mouth daily Do not start before June 12, 2023. escitalopram (LEXAPRO) 5 mg tablet   No No   Sig: TAKE 1 TABLET DAILY   ferrous sulfate 325 (65 Fe) mg tablet   No No   Sig: Take 1 tablet (325 mg total) by mouth daily at bedtime   glucose blood (OneTouch Verio) test strip  Self No No   Sig: Check blood sugars twice daily. Please substitute with appropriate alternative as covered by patient's insurance. Dx: E11.65   insulin glulisine (Apidra) 100 units/mL injection   No No   Sig: Inject 30 units tid with meals   levothyroxine 175 mcg tablet   No No   Sig: Take 1 tablet (175 mcg total) by mouth daily in the early morning   omeprazole (PriLOSEC) 40 MG capsule  Self No No   Sig: TAKE 1 CAPSULE DAILY   Patient taking differently: 40 mg every morning   ondansetron (ZOFRAN) 4 mg tablet   No No   Sig: Take 1 tablet (4 mg total) by mouth every 6 (six) hours as needed for nausea or vomiting   rivaroxaban (XARELTO) 10 mg tablet   No No   Sig: Take 1 tablet (10 mg total) by mouth daily with breakfast for 12 days Do not start before May 31, 2023.    verapamil (VERELAN PM) 360 MG 24 hr capsule  Self No No   Sig: TAKE 1 CAPSULE DAILY AT BEDTIME   zolpidem (AMBIEN) 10 mg tablet   No No   Sig: Take 1 tablet (10 mg total) by mouth daily at bedtime as needed for sleep      Facility-Administered Medications: None       Past Medical History:   Diagnosis Date   • Broken internal right knee prosthesis ([address]) 01/2023   • Chronic kidney disease    • Colon polyp    • Constipation 03/09/2023   • Diabetes mellitus ([address])    • Disease of thyroid gland    • GERD (gastroesophageal reflux disease)    • HHS vs DKA 11/16/2018   • Hyperlipidemia    • Hypertension    • Thyroid cancer ([address])        Past Surgical History:   Procedure Laterality Date   • COLONOSCOPY     • FOOT AMPUTATION Right    • FOOT SURGERY Right 2014   • IR AORTAGRAM WITH RUN-OFF  08/06/2020   • IR TUNNELED CENTRAL LINE PLACEMENT  09/08/2020   • IR TUNNELED CENTRAL LINE REMOVAL  09/28/2020   • IA AMPUTATION FOOT TRANSMETARSAL Right 09/03/2020    Procedure: AMPUTATION TRANSMETATARSAL (TMA);  Surgeon: Catalina Wood DPM;  Location: [facility];  Service: Podiatry   • IA OPEN [place] UNICONDYLAR Right 5/1/2023    Procedure: OPEN REDUCTION W/ INTERNAL FIXATION (ORIF) RIGHT TIBIAL PLATEAU NONUNION;  Surgeon: Taylor Greenberg MD;  Location: [facility];  Service: Orthopedics   • IA THYROIDECTOMY TOTAL/COMPLETE N/A 02/03/2021    Procedure: TOTAL THYROIDECTOMY WITH NIMS MONITORING;  Surgeon: Miguel More MD;  Location:  MAIN OR;  Service: ENT   • TOE AMPUTATION Right 08/11/2020    Procedure: AMPUTATION TOE;  Surgeon: Catalina Wood DPM;  Location: [facility];  Service: Podiatry   • US GUIDED THYROID BIOPSY  07/02/2020       Family History   Problem Relation Age of Onset   • Cancer Mother    • Hypertension Father      I have reviewed and agree with the history as documented.     E-Cigarette/Vaping   • E-Cigarette Use Never User      E-Cigarette/Vaping Substances   • Nicotine No    • THC No    • CBD No    • Flavoring No    • Other No    • Unknown No      Social History     Tobacco Use   • Smoking status: Never   • Smokeless tobacco: Never   Vaping Use   • Vaping Use: Never used Substance Use Topics   • Alcohol use: Not Currently     Comment: quit 1-2023   • Drug use: Not Currently     Types: Marijuana       Review of Systems   Constitutional: Negative for chills and fever. HENT: Negative for congestion, ear pain, rhinorrhea and sore throat. Respiratory: Negative for cough, chest tightness, shortness of breath and wheezing. Cardiovascular: Negative for chest pain and palpitations. Gastrointestinal: Positive for abdominal pain, diarrhea, nausea and vomiting. Negative for abdominal distention, blood in stool and constipation. Genitourinary: Positive for decreased urine volume. Negative for dysuria, flank pain, frequency and hematuria. Musculoskeletal: Negative for back pain, neck pain and neck stiffness. Skin: Negative for color change, rash and wound. Allergic/Immunologic: Negative for immunocompromised state. Neurological: Positive for light-headedness. Negative for dizziness, syncope, weakness, numbness and headaches. Hematological: Negative for adenopathy. Does not bruise/bleed easily. Psychiatric/Behavioral: Positive for dysphoric mood. Negative for confusion, decreased concentration and hallucinations. The patient is nervous/anxious. All other systems reviewed and are negative. Physical Exam  Physical Exam  Vitals and nursing note reviewed. Constitutional:       General: He is not in acute distress. Appearance: Normal appearance. He is well-developed. He is not ill-appearing, toxic-appearing or diaphoretic. HENT:      Head: Normocephalic and atraumatic. Right Ear: External ear normal.      Left Ear: External ear normal.      Nose: Nose normal.      Mouth/Throat:      Mouth: Mucous membranes are moist.      Pharynx: Oropharynx is clear. No oropharyngeal exudate. Eyes:      Extraocular Movements: Extraocular movements intact. Conjunctiva/sclera: Conjunctivae normal.      Pupils: Pupils are equal, round, and reactive to light.    Neck: Vascular: No JVD. Cardiovascular:      Rate and Rhythm: Normal rate and regular rhythm. Pulses: Normal pulses. Heart sounds: Normal heart sounds. No murmur heard. No friction rub. No gallop. Pulmonary:      Effort: Pulmonary effort is normal. No respiratory distress. Breath sounds: Normal breath sounds. No wheezing, rhonchi or rales. Abdominal:      General: There is no distension. Palpations: Abdomen is soft. Tenderness: There is abdominal tenderness. There is no guarding or rebound. Comments: +Central lower abdominal tenderness. Musculoskeletal:         General: Swelling present. No tenderness. Normal range of motion. Cervical back: Normal range of motion and neck supple. No rigidity. Comments: Right knee joint effusion and mild overlying warmth but no significant overlying erythema. Patient able to flex and extend the knee passively and actively without pain or difficulty. No tenderness over the right knee joint. Skin:     General: Skin is warm and dry. Coloration: Skin is not pale. Findings: No erythema or rash. Neurological:      General: No focal deficit present. Mental Status: He is alert and oriented to person, place, and time. Sensory: No sensory deficit. Motor: No weakness. Psychiatric:      Comments: Patient is very tearful and appears significantly depressed and anxious.          Vital Signs  ED Triage Vitals [08/15/23 1636]   Temperature Pulse Respirations Blood Pressure SpO2   98.2 °F (36.8 °C) 97 20 121/91 98 %      Temp src Heart Rate Source Patient Position - Orthostatic VS BP Location FiO2 (%)   -- Monitor Sitting Right arm --      Pain Score       --         Vitals:    08/15/23 1815 08/15/23 1900 08/15/23 2000 08/15/23 2100   BP: (!) 179/87 (!) 195/112 135/89 139/100   BP Location: Right arm      Pulse: 97 90 93 90   Resp: 22 16 18 22   Temp:       SpO2: 98% 98% 96% 96%   Weight:       Height:           Visual Acuity      ED Medications  Medications   sodium chloride 0.9 % bolus 1,000 mL (0 mL Intravenous Stopped 8/15/23 1913)   LORazepam (ATIVAN) tablet 0.5 mg (0.5 mg Oral Given 8/15/23 1713)   ondansetron (ZOFRAN) injection 4 mg (4 mg Intravenous Given 8/15/23 1713)   magnesium Oxide (MAG-OX) tablet 400 mg (400 mg Oral Given 8/15/23 1806)   iohexol (OMNIPAQUE) 350 MG/ML injection (SINGLE-DOSE) 100 mL (100 mL Intravenous Given 8/15/23 1804)   OLANZapine (ZyPREXA ZYDIS) dispersible tablet 5 mg (5 mg Oral Given 8/15/23 2102)       Diagnostic Studies  Results Reviewed     Procedure Component Value Units Date/Time    Urine Microscopic [[id number]]  (Abnormal) Collected: 08/15/23 2149    Lab Status: Final result Specimen: Urine, Clean Catch Updated: 08/15/23 2304     RBC, UA 2-4 /hpf      WBC, UA 1-2 /hpf      Epithelial Cells None Seen /hpf      Bacteria, UA None Seen /hpf      MUCUS THREADS Moderate    Rapid drug screen, urine [[id number]]  (Abnormal) Collected: 08/15/23 2149    Lab Status: Final result Specimen: Urine, Clean Catch Updated: 08/15/23 2243     Amph/Meth UR Negative     Barbiturate Ur Negative     Benzodiazepine Urine Negative     Cocaine Urine Negative     Methadone Urine Negative     Opiate Urine Negative     PCP Ur Negative     THC Urine Positive     Oxycodone Urine Negative    Narrative:      Presumptive report. If requested, specimen will be sent to reference lab for confirmation. FOR MEDICAL PURPOSES ONLY. IF CONFIRMATION NEEDED PLEASE CONTACT THE LAB WITHIN 5 DAYS.     Drug Screen Cutoff Levels:  AMPHETAMINE/METHAMPHETAMINES  1000 ng/mL  BARBITURATES     200 ng/mL  BENZODIAZEPINES     200 ng/mL  COCAINE      300 ng/mL  METHADONE      300 ng/mL  OPIATES      300 ng/mL  PHENCYCLIDINE     25 ng/mL  THC       50 ng/mL  OXYCODONE      100 ng/mL    UA (URINE) with reflex to Scope [[id number]]  (Abnormal) Collected: 08/15/23 2149    Lab Status: Final result Specimen: Urine, Clean Catch Updated: 08/15/23 2222 Color, UA Yellow     Clarity, UA Clear     Specific Gravity, UA >=1.050     pH, UA 6.0     Leukocytes, UA Negative     Nitrite, UA Negative     Protein, UA 50 (1+) mg/dl      Glucose, UA Negative mg/dl      Ketones, UA Trace mg/dl      Urobilinogen, UA <2.0 mg/dl      Bilirubin, UA Negative     Occult Blood, UA Trace    HS Troponin I 2hr [[id number]]  (Normal) Collected: 08/15/23 1913    Lab Status: Final result Specimen: Blood from Arm, Right Updated: 08/15/23 1943     hs TnI 2hr 6 ng/L      Delta 2hr hsTnI 1 ng/L     TSH, 3rd generation with Free T4 reflex [[id number]]  (Normal) Collected: 08/15/23 1702    Lab Status: Final result Specimen: Blood from Arm, Right Updated: 08/15/23 1833     TSH 3RD GENERATON 4.358 uIU/mL     Ethanol [[id number]]  (Normal) Collected: 08/15/23 1809    Lab Status: Final result Specimen: Blood from Arm, Right Updated: 08/15/23 1832     Ethanol Lvl <10 mg/dL     FLU/RSV/COVID - if FLU/RSV clinically relevant [[id number]]  (Normal) Collected: 08/15/23 1702    Lab Status: Final result Specimen: Nares from Nose Updated: 08/15/23 1752     SARS-CoV-2 Negative     INFLUENZA A PCR Negative     INFLUENZA B PCR Negative     RSV PCR Negative    Narrative:      FOR PEDIATRIC PATIENTS - copy/paste COVID Guidelines URL to browser: [email or web address]    SARS-CoV-2 assay is a Nucleic Acid Amplification assay intended for the  qualitative detection of nucleic acid from SARS-CoV-2 in nasopharyngeal  swabs. Results are for the presumptive identification of SARS-CoV-2 RNA. Positive results are indicative of infection with SARS-CoV-2, the virus  causing COVID-19, but do not rule out bacterial infection or co-infection  with other viruses. Laboratories within the [facility] and its  territories are required to report all positive results to the appropriate  public health authorities.  Negative results do not preclude SARS-CoV-2  infection and should not be used as the sole basis for treatment or other  patient management decisions. Negative results must be combined with  clinical observations, patient history, and epidemiological information. This test has not been FDA cleared or approved. This test has been authorized by FDA under an Emergency Use Authorization  (EUA). This test is only authorized for the duration of time the  declaration that circumstances exist justifying the authorization of the  emergency use of an in vitro diagnostic tests for detection of SARS-CoV-2  virus and/or diagnosis of COVID-19 infection under section 564(b)(1) of  the Act, 21 U. S.C. 021CWC-5(R)(4), unless the authorization is terminated  or revoked sooner. The test has been validated but independent review by FDA  and CLIA is pending. Test performed using [employer]: This RT-PCR assay targets N2,  a region unique to SARS-CoV-2. A conserved region in the E-gene was chosen  for pan-Sarbecovirus detection which includes SARS-CoV-2. According to CMS-2020-01-R, this platform meets the definition of high-throughput technology. Lactic acid, plasma (w/reflex if result > 2.0) [[id number]]  (Normal) Collected: 08/15/23 1702    Lab Status: Final result Specimen: Blood from Arm, Right Updated: 08/15/23 1735     LACTIC ACID 1.9 mmol/L     Narrative:      Result may be elevated if tourniquet was used during collection.     HS Troponin 0hr (reflex protocol) [[id number]]  (Normal) Collected: 08/15/23 1702    Lab Status: Final result Specimen: Blood from Arm, Right Updated: 08/15/23 1734     hs TnI 0hr 5 ng/L     Basic metabolic panel [[id number]] Collected: 08/15/23 1702    Lab Status: Final result Specimen: Blood from Arm, Right Updated: 08/15/23 1730     Sodium 136 mmol/L      Potassium 3.8 mmol/L      Chloride 104 mmol/L      CO2 21 mmol/L      ANION GAP 11 mmol/L      BUN 22 mg/dL      Creatinine 0.93 mg/dL      Glucose 121 mg/dL      Calcium 10.0 mg/dL      eGFR 88 ml/min/1.73sq m     Narrative:      [facility] guidelines for Chronic Kidney Disease (CKD):   •  Stage 1 with normal or high GFR (GFR > 90 mL/min/1.73 square meters)  •  Stage 2 Mild CKD (GFR = 60-89 mL/min/1.73 square meters)  •  Stage 3A Moderate CKD (GFR = 45-59 mL/min/1.73 square meters)  •  Stage 3B Moderate CKD (GFR = 30-44 mL/min/1.73 square meters)  •  Stage 4 Severe CKD (GFR = 15-29 mL/min/1.73 square meters)  •  Stage 5 End Stage CKD (GFR <15 mL/min/1.73 square meters)  Note: GFR calculation is accurate only with a steady state creatinine    Hepatic function panel [[id number]]  (Abnormal) Collected: 08/15/23 1702    Lab Status: Final result Specimen: Blood from Arm, Right Updated: 08/15/23 1730     Total Bilirubin 0.73 mg/dL      Bilirubin, Direct 0.16 mg/dL      Alkaline Phosphatase 157 U/L      AST 31 U/L      ALT 30 U/L      Total Protein 8.3 g/dL      Albumin 4.4 g/dL     Lipase [[id number]]  (Normal) Collected: 08/15/23 1702    Lab Status: Final result Specimen: Blood from Arm, Right Updated: 08/15/23 1730     Lipase 20 u/L     Magnesium [[id number]]  (Abnormal) Collected: 08/15/23 1702    Lab Status: Final result Specimen: Blood from Arm, Right Updated: 08/15/23 1730     Magnesium 1.8 mg/dL     Protime-INR [[id number]]  (Normal) Collected: 08/15/23 1702    Lab Status: Final result Specimen: Blood from Arm, Right Updated: 08/15/23 1729     Protime 13.9 seconds      INR 1.09    APTT [[id number]]  (Normal) Collected: 08/15/23 1702    Lab Status: Final result Specimen: Blood from Arm, Right Updated: 08/15/23 1729     PTT 28 seconds     CBC and differential [[id number]]  (Abnormal) Collected: 08/15/23 1702    Lab Status: Final result Specimen: Blood from Arm, Right Updated: 08/15/23 1710     WBC 12.55 Thousand/uL      RBC 4.54 Million/uL      Hemoglobin 13.2 g/dL      Hematocrit 38.9 %      MCV 86 fL      MCH 29.1 pg      MCHC 33.9 g/dL      RDW 14.1 %      MPV 11.4 fL      Platelets 786 Thousands/uL      nRBC 0 /100 WBCs      Neutrophils Relative 64 %      Immat GRANS % 0 %      Lymphocytes Relative 23 %      Monocytes Relative 11 %      Eosinophils Relative 1 %      Basophils Relative 1 %      Neutrophils Absolute 8.23 Thousands/µL      Immature Grans Absolute 0.03 Thousand/uL      Lymphocytes Absolute 2.82 Thousands/µL      Monocytes Absolute 1.33 Thousand/µL      Eosinophils Absolute 0.08 Thousand/µL      Basophils Absolute 0.06 Thousands/µL                  CT head without contrast   Final Result by Melchor Escalera DO (08/15 1915)      No acute intracranial abnormality. Workstation performed: [id number]         CT abdomen pelvis with contrast   Final Result by Melchor Escalera DO (08/15 1929)      1. No acute intra-abdominal abnormality. 2. Cholelithiasis. 3. Very mild mistiness of the mesenteric fat with shotty lymph nodes, findings which may represent mesenteritic panniculitis. Typically, this is self-limited, incidental finding. If patient has or develops constitutional symptoms, 1 additional CT    followup study in 4-6 months could be obtained to ensure stability. Workstation performed: [id number]         XR chest 1 view portable   ED Interpretation by Shayy Rossi DO (08/15 1735)   No acute abnormality in the chest.      Final Result by Melchor Escalera DO (08/15 1938)      No acute cardiopulmonary disease.                   Workstation performed: [id number]                    Procedures  ECG 12 Lead Documentation Only    Date/Time: 8/15/2023 5:08 PM    Performed by: Shayy Rossi DO  Authorized by: Shayy Rossi DO    ECG reviewed by me, the ED Provider: yes    Patient location:  ED  Previous ECG:     Previous ECG:  Compared to current    Comparison ECG info:  8-4-2023; axis shifted left  Rate:     ECG rate:  95    ECG rate assessment: normal    Rhythm:     Rhythm: sinus rhythm    Ectopy: Ectopy: none    QRS:     QRS axis:  Left    QRS intervals:  Normal  Conduction:     Conduction: normal    ST segments:     ST segments:  Normal  T waves:     T waves: normal               ED Course  ED Course as of 08/17/23 1615   Tue Aug 15, 2023   1732 Creatinine: 0.93   1732 eGFR: 88   1735 Magnesium(!): 1.8  Will replace with 400mg PO mag ox. 1735 Patient requested to speak with me and states he wants to be admitted as he does not feel safe at home. Patient becomes very tearful again and appears significantly depressed. At this time, will await further medical work-up and then will consult with ED crisis worker if there is no indication for medical admission. [address] with [facility], ED crisis worker who will evaluate patient once he is medically cleared and that is pending CT reports. If CT is unremarkable, no indication for medical admission and will then request ED crisis worker consultation. 1913 Patient reassessed and updated him of normal work-up thus far. Waiting on CT results. I explained to patient that if CT scans are unremarkable, will have our ED crisis worker evaluate patient. He admits to being increasingly depressed over his medical conditions over the last 8 months and admits to suicidal thoughts. He states he does have guns at home and has thought about using his guns which is why he does not feel safe at home. He denies any prior suicide attempt. Will update ED crisis worker of the fact that he has had SI with plan and strongly recommend inpatient psychiatric admission if medical work-up unremarkable. Signed patient out to Dr. Ирина Mckay who will follow-up CT imaging and then contact crisis worker if CT scan is unremarkable. Medical Decision Making  76-year-old male presents to the ED for persistent nausea, vomiting, constipation and now diarrhea after taking multiple doses of MiraLAX back to back. Suspect his diarrhea is secondary to MiraLAX.   Constipation could be due to medication, possible dehydration or poor p.o. intake of water, lack of fiber in his diet. Given his decreased urination and dark urine, concern for possible dehydration and MARKELL. Will work-up with abdominal and cardiac labs, UA, EKG, chest x-ray and CT abdomen and pelvis. Will give 0.5 mg of Ativan for anxiolysis, normal saline bolus 1 L, Zofran for nausea. If work-up unremarkable, will consider ED crisis worker consultation for further mental health evaluation. Amount and/or Complexity of Data Reviewed  Labs: ordered. Decision-making details documented in ED Course. Radiology: ordered and independent interpretation performed. Decision-making details documented in ED Course. ECG/medicine tests: ordered and independent interpretation performed. Decision-making details documented in ED Course. Risk  OTC drugs. Prescription drug management. Disposition  Final diagnoses:   Depression with suicidal ideation   Lower abdominal pain   Nausea   Constipation   Diarrhea     Time reflects when diagnosis was documented in both MDM as applicable and the Disposition within this note     Time User Action Codes Description Comment    8/15/2023  7:16 PM Donya Furrow E Add [place].Earing. A,  R45.851] Depression with suicidal ideation     8/15/2023  7:16 PM Donya Furrow E Add [R10.30] Lower abdominal pain     8/15/2023  7:16 PM Donya Furrow E Add [R11.0] Nausea     8/15/2023  7:16 PM Donya Furrow E Add [K59.00] Constipation     8/15/2023  7:16 PM Donya Furrow E Add [R19.7] Diarrhea       ED Disposition     ED Disposition   Discharge    Condition   Stable    Date/Time   Wed Aug 16, 2023  1:06 AM    Comment   [facility] discharge to home/self care.                Follow-up Information     Follow up With Specialties Details Why Contact Info    Fabiana Meadows MD [facility]   [address] [place] [address]  [phone]            Discharge Medication List as of 8/16/2023  1:06 AM CONTINUE these medications which have NOT CHANGED    Details   acetaminophen (TYLENOL) 325 mg tablet Take 2 tablets (650 mg total) by mouth every 8 (eight) hours as needed for mild pain, Starting Wed 5/10/2023, Normal      atorvastatin (LIPITOR) 40 mg tablet Take 1 tablet (40 mg total) by mouth daily with dinner, Starting Tue 11/15/2022, Until Mon 5/1/2023, Normal      BD PEN NEEDLE DEV U/F 32G X 4 MM MISC Inject under the skin 4 (four) times a day, Starting Thu 1/24/2019, Normal      !! Blood Glucose Monitoring Suppl (ONE TOUCH ULTRA 2) w/Device KIT Use 2 (two) times a day, Starting Mon 3/20/2023, Normal      !! Blood Glucose Monitoring Suppl (OneTouch Verio Reflect) w/Device KIT Check blood sugars twice daily. Please substitute with appropriate alternative as covered by patient's insurance. Dx: E11.65, Normal      clopidogrel (PLAVIX) 75 mg tablet Take 1 tablet (75 mg total) by mouth daily Do not start before June 12, 2023., Starting Mon 6/12/2023, No Print      escitalopram (LEXAPRO) 5 mg tablet TAKE 1 TABLET DAILY, Starting Thu 8/3/2023, Normal      ferrous sulfate 325 (65 Fe) mg tablet Take 1 tablet (325 mg total) by mouth daily at bedtime, Starting Tue 5/30/2023, Normal      glucose blood (OneTouch Verio) test strip Check blood sugars twice daily. Please substitute with appropriate alternative as covered by patient's insurance.  Dx: E11.65, Normal      Insulin Glargine Solostar (Lantus SoloStar) 100 UNIT/ML SOPN Inject 45 units bid, No Print      insulin glulisine (Apidra) 100 units/mL injection Inject 30 units tid with meals, No Print      levothyroxine 175 mcg tablet Take 1 tablet (175 mcg total) by mouth daily in the early morning, Starting Tue 5/30/2023, Normal      omeprazole (PriLOSEC) 40 MG capsule TAKE 1 CAPSULE DAILY, Normal      ondansetron (ZOFRAN) 4 mg tablet Take 1 tablet (4 mg total) by mouth every 6 (six) hours as needed for nausea or vomiting, Starting Fri 8/4/2023, Normal      OneTouch Delica Lancets 28F MISC Check blood sugars twice daily. Please substitute with appropriate alternative as covered by patient's insurance. Dx: E11.65, Normal      rivaroxaban (XARELTO) 10 mg tablet Take 1 tablet (10 mg total) by mouth daily with breakfast for 12 days Do not start before May 31, 2023., Starting Wed 5/31/2023, Until Mon 6/12/2023, Normal      verapamil (VERELAN PM) 360 MG 24 hr capsule TAKE 1 CAPSULE DAILY AT BEDTIME, Normal      zolpidem (AMBIEN) 10 mg tablet Take 1 tablet (10 mg total) by mouth daily at bedtime as needed for sleep, Starting Mon 5/1/2023, Until Sat 10/28/2023 at 2359, Normal       !! - Potential duplicate medications found. Please discuss with provider. No discharge procedures on file.     PDMP Review       Value Time User    PDMP Reviewed  Yes 5/30/2023  9:11 AM Glo Arevalo PA-C          ED Provider  Electronically Signed by           Arturo Castellanos DO  08/17/23 3651

## 2023-08-15 NOTE — ED NOTES
Pt tearful expressing the desire to die due to his situation of being sick states his wife has to do so much for him and at time she yells. He states he does not want to live like this any more, and he states feeling like this the past 4 days.      Shannan Longo RN  08/15/23 7959

## 2023-08-16 NOTE — CONSULTS
TeleConsultation - [facility] 59 y.o. male MRN: [id number]  Unit/Bed#: ED 25 Encounter: [id number]        REQUIRED DOCUMENTATION:     1. This service was provided via Telemedicine. 2. Provider located at [facility].  3. TeleMed provider: Rosanna Dorman MD.  4. Identify all parties in room with patient during tele consult: Patient   5. Patient was then informed that this was a Telemedicine visit and that the exam was being conducted confidentially over secure lines. My office door was closed. No one else was in the room. Patient acknowledged consent and understanding of privacy and security of the Telemedicine visit, and gave us permission to have the assistant stay in the room in order to assist with the history and to conduct the exam.  I informed the patient that I have reviewed their record in Epic and presented the opportunity for them to ask any questions regarding the visit today. The patient agreed to participate. Discussed with Dana Borrego M.D     Assessment/Plan     Present on Admission:  **None**    Assessment:    Adjustment Disorder with mixed emotions     Patient presents with some fleeting SI without active plan or intent no prior history of suicide attempts. Patient endorsing some GI upset related to restarting sertraline and instructed patient to decrease his dose by 50% for 1 week. Lastly recommend outpatient psychotherapy. Patient without any indication for voluntary or involuntary inpatient psychiatric admission safe for discharge home. Treatment Plan:    Planned Medication Changes:    -Instructed to decrease Sertraline dose by 50%    Current Medications:         Risks / Benefits of Treatment:    Risks, benefits, and possible side effects of medications explained to patient and patient verbalizes understanding.       Other treatment modalities recommended as indicated:    · psychotherapy  · outpatient referral      Consults  Physician Requesting Consult: Dana Borrego MD  Principal Problem:<principal problem not specified>    Reason for Consult: Psych Evaluation       History of Present Illness      Per Crisis Eval by Evan Part:   Saran Montes is a 60 y/o male diagnosed with Depression and Anxiety. Patient presented to ED via EMS due to abdominal pain but then stated he was suicidal. Patient appears depressed, cooperative. Patient currently lives with his wife who is taking care of him. Patient broke his knee in January 2023 and since then is unable to fully care for himself without assistance of his wife. This situation put strain on his marriage. Pt states his wife often yells at him, possibly from exhaustion. Pt states he began feeling very depressed 8 months ago after accident, with recent drastic increase. Pt states he daily things about killing himself, as he is tired of his situation, being burden to his wife, and feeling like this. Pt states he keeps a gun under his pillow contemplating on pulling the trigger and ending all. Pt multiple times stated "I just don't want to live". Pt denies HI, self harming, hallucinations. Pt reports decrease in appetite. Pt reports when he takes medication he sleeps but if not he cannot sleep due to intrusive suicidal thoughts. No abuse reported. Currently receives Lexapro from PCP. On and off medication. Crisis discussed the treatment. Pt not open for Inpatient Psychiatric Hospitalization stated "I think I will be fine. I think if I could only get out of the house I would be much better. Doctor told me once that I am feeling this way only temporarily". Pt willing to talk to the psychiatrist. Crisis informed pt about 201, 302. Pt in understating. Patient reports that he feels that he had a breakdown today and that he had stopped taking his Zoloft. Patient reports that he was started on the medication when he was at [address] [place] [address] and stopped taking it a week ago and restarted again today and has been very nauseous.  Patient reports that he is tired of the situation that he is in stating he has not been able to walk since Jan. Patient reports that while in rehab he broke his leg again. Patient reports that he took his first steps in PT recently. Patient reports the first time he broke his leg he was suicidal and was on a 1:1 but that was discontinued and did not go to rehab. Patient presents with difficulties with recovery process related to falls and associated broken bones and limited mobility. Patient struggled with reliance on his wife and not happy with the way his life is going. Patient denied any current SI/HI/AVH or other acute psychiatric complaints. Psychiatric Review Of Systems:    sleep: yes  appetite changes: no  weight changes: no  energy/anergy: no  interest/pleasure/anhedonia: no  somatic symptoms: no  anxiety/panic: no  salma: no  guilty/hopeless: no  self injurious behavior/risky behavior: no    Historical Information     Past Psychiatric History:     Psychiatric Hospitalizations:   • No history of past inpatient psychiatric admissions  Outpatient Treatment History:   • Denied   Suicide Attempts:   • None  History of self-harm:   • None  Violence History:   • no  Past Psychiatric medication trials: Ambien      Substance Abuse History: Denied but previously drank very heavily but denied any history of DTs or Withdrawal Seizures      Family Psychiatric History: Denied          Social History:    Education: high school diploma/GED  Learning Disabilities: Denied  Marital history:   Children: Yes  Living arrangement, social support: The patient lives in home with wife. Occupational History: retired  Functioning Relationships: good support system.   Other Pertinent History: None    Traumatic History:     Abuse: None  Other Traumatic Events: none    Past Medical History:   Diagnosis Date   • Broken internal right knee prosthesis ([address]) 01/2023   • Chronic kidney disease    • Colon polyp    • Constipation 03/09/2023   • Diabetes mellitus ([address])    • Disease of thyroid gland    • GERD (gastroesophageal reflux disease)    • HHS vs DKA 11/16/2018   • Hyperlipidemia    • Hypertension    • Thyroid cancer [facility])        Medical Review Of Systems:    Review of Systems    Meds/Allergies     all current active meds have been reviewed  Allergies   Allergen Reactions   • Vancomycin Other (See Comments)     Kidney issues   • Pollen Extract Eye Swelling     Eyes get watery        Objective     Vital signs in last 24 hours:  Temp:  [98.2 °F (36.8 °C)] 98.2 °F (36.8 °C)  HR:  [90-97] 90  Resp:  [16-22] 22  BP: (121-195)/() 139/100      Intake/Output Summary (Last 24 hours) at 8/15/2023 7533  Last data filed at 8/15/2023 1913  Gross per 24 hour   Intake 1000 ml   Output --   Net 1000 ml       Mental Status Evaluation:    Appearance:  age appropriate   Behavior:  normal   Speech:  normal pitch and normal volume   Mood:  normal   Affect:  normal   Language: naming objects   Thought Process:  normal   Associations intact associations   Thought Content:  normal   Perceptual Disturbances: None   Risk Potential: Suicidal Ideations none  Homicidal Ideations none  Potential for Aggression No   Sensorium:  person, place and time/date   Cognition:  recent and remote memory grossly intact   Consciousness:  alert    Attention: attention span and concentration were age appropriate   Intellect: within normal limits   Fund of Knowledge: awareness of current events: President   Insight:  limited   Judgment: limited   Muscle Strength:  Muscle Tone: normal NFT  normal   Gait/Station: normal gait/station   Motor Activity: no abnormal movements       Lab Results: I have personally reviewed all pertinent laboratory/tests results.      Most Recent Labs:   Lab Results   Component Value Date    WBC 12.55 (H) 08/15/2023    RBC 4.54 08/15/2023    HGB 13.2 08/15/2023    HCT 38.9 08/15/2023     08/15/2023    RDW 14.1 08/15/2023    NEUTROABS 8.23 (H) 08/15/2023    SODIUM 136 08/15/2023    K 3.8 08/15/2023     08/15/2023    CO2 21 08/15/2023    BUN 22 08/15/2023    CREATININE 0.93 08/15/2023    GLUC 121 08/15/2023    GLUF 180 (H) 05/29/2023    CALCIUM 10.0 08/15/2023    AST 31 08/15/2023    ALT 30 08/15/2023    ALKPHOS 157 (H) 08/15/2023    TP 8.3 08/15/2023    ALB 4.4 08/15/2023    TBILI 0.73 08/15/2023    CHOLESTEROL 129 04/24/2023    HDL 31 (L) 04/24/2023    TRIG 117 04/24/2023    LDLCALC 75 04/24/2023    NONHDLC 98 04/24/2023    PTA3WDMIZEIA 4.358 08/15/2023    FREET4 0.84 11/14/2022    HGBA1C 7.9 (H) 04/24/2023     04/24/2023       Imaging Studies: XR chest 1 view portable    Result Date: 8/15/2023  Narrative: CHEST INDICATION:  Dizziness. COMPARISON: 3/3/2023 EXAM PERFORMED/VIEWS:  XR CHEST PORTABLE  AP semierect Images: 2 FINDINGS:  Monitoring leads and clips project over the chest. Cardiomediastinal silhouette appears unremarkable. The lungs are clear. No pneumothorax or pleural effusion. Osseous structures appear within normal limits for patient age. Impression: No acute cardiopulmonary disease. Workstation performed: [id number]     CT abdomen pelvis with contrast    Result Date: 8/15/2023  Narrative: CT ABDOMEN AND PELVIS WITH IV CONTRAST INDICATION:   Abdominal pain, acute, nonlocalized lower abd pain, nausea/vomiting, was constipated now having diarrhea, suprapubic ttp. COMPARISON: Renal ultrasound 3/4/2023 TECHNIQUE:  CT examination of the abdomen and pelvis was performed. Multiplanar 2D reformatted images were created from the source data. This examination, like all CT scans performed in the [facility], was performed utilizing techniques to minimize radiation dose exposure, including the use of iterative reconstruction and automated exposure control. Radiation dose length product (DLP) for this visit:  1103 mGy-cm IV Contrast:  100 mL of iohexol (OMNIPAQUE) Enteric Contrast:  Enteric contrast was not administered.  FINDINGS: ABDOMEN LOWER CHEST:  No clinically significant abnormality identified in the visualized lower chest. LIVER/BILIARY TREE:  Unremarkable. GALLBLADDER:  There are gallstone(s) within the gallbladder, without pericholecystic inflammatory changes. SPLEEN:  Unremarkable. Small splenule noted. PANCREAS: Mild fatty replacement of the pancreas without acute findings. ADRENAL GLANDS:  Unremarkable. KIDNEYS/URETERS:  Unremarkable. No hydronephrosis. STOMACH AND BOWEL: Small hiatal hernia. The small bowel is normal caliber. Ascending colon diverticulosis without evidence of diverticulitis. APPENDIX:  No findings to suggest appendicitis. ABDOMINOPELVIC CAVITY:  No ascites. No pneumoperitoneum. Very mild mistiness of the mesenteric fat with shotty lymph nodes, findings which may represent mesenteritic panniculitis. VESSELS: Atherosclerotic changes abdominal aorta without evidence of aneurysm. PELVIS REPRODUCTIVE ORGANS:  Unremarkable for patient's age. URINARY BLADDER:  Unremarkable. ABDOMINAL WALL/INGUINAL REGIONS:  Unremarkable. OSSEOUS STRUCTURES:  No acute fracture or destructive osseous lesion. Degenerative change of the spine. Old bilateral rib fractures. Impression: 1. No acute intra-abdominal abnormality. 2. Cholelithiasis. 3. Very mild mistiness of the mesenteric fat with shotty lymph nodes, findings which may represent mesenteritic panniculitis. Typically, this is self-limited, incidental finding. If patient has or develops constitutional symptoms, 1 additional CT followup study in 4-6 months could be obtained to ensure stability. Workstation performed: [id number]     CT head without contrast    Result Date: 8/15/2023  Narrative: CT BRAIN - WITHOUT CONTRAST INDICATION:  Intermittent left hand paresthesia. COMPARISON: CTA head and neck 11/15/2018 TECHNIQUE:  CT examination of the brain was performed. Multiplanar 2D reformatted images were created from the source data.  Radiation dose length product (DLP) for this visit:  191 6170 6970 mGy-cm. This examination, like all CT scans performed in the [facility], was performed utilizing techniques to minimize radiation dose exposure, including the use of iterative reconstruction and automated exposure control. IMAGE QUALITY:  Diagnostic. FINDINGS: PARENCHYMA:  No intracranial mass, mass effect or midline shift. No CT signs of acute infarction. No acute parenchymal hemorrhage. Mild cortical atrophy. Periventricular white matter hypodensity which is nonspecific although most compatible with chronic small vessel ischemic disease. Vascular calcifications. VENTRICLES AND EXTRA-AXIAL SPACES:  Normal for the patient's age. VISUALIZED ORBITS: Normal visualized orbits. PARANASAL SINUSES: Mild mucosal thickening of the visualized paranasal sinuses. The mastoid air cells are clear. CALVARIUM AND EXTRACRANIAL SOFT TISSUES:  Normal.     Impression: No acute intracranial abnormality. Workstation performed: [id number]     XR knee 1 or 2 vw right    Result Date: 8/8/2023  Narrative: RIGHT KNEE INDICATION:   Z98.890: Other specified postprocedural states Z87.81: Personal history of (healed) traumatic fracture. COMPARISON:  None VIEWS:  XR KNEE 1 OR 2 VW RIGHT Images: 3 FINDINGS: Plate-screw fixation of the proximal tibia with a bone graft. Hardware intact. There is varus collapse with increasing displacement of the tibial fragment. Popliteal artery stent seen There is no joint effusion. No significant degenerative changes. No lytic or blastic osseous lesion. Soft tissues are unremarkable.      Impression: [Internal fixation of the tibial plateau fracture with bone graft with varus collapse and some increasing displacement of the tibial fragment Workstation performed: [id number]     EKG/Pathology/Other Studies:   Lab Results   Component Value Date    VENTRATE 91 08/15/2023    ATRIALRATE 91 08/15/2023    PRINT 186 08/15/2023    QRSDINT 96 08/15/2023    QTINT 388 08/15/2023    QTCINT 477 08/15/2023 PAXIS 34 08/15/2023    QRSAXIS -39 08/15/2023    TWAVEAXIS 39 08/15/2023        Code Status: Prior  Advance Directive and Living Will:      Power of :    POLST:      Screenings:    1. Nutrition Screening  Nutrition Assessment (completed by Staff): Nutrition  Appetite: Poor    2. Pain Screening  Pain Screening:      3. Suicide Screening  ED Crisis Suicide Risk Assessment: Suicide Risk Assessment  Violence Risk to Self: Denies ideation within past 6 months  Description of self harming behaviors or thoughts[de-identified] suicidal thoughts  Protective Factors: The patient has belief that he/she can learn to adjust or cope with problems              C-SSRS    1) Have you wished you were dead or wished you could go to sleep and not wake up? No   2) Have you actually had any thoughts about killing yourself? NO   If YES to 2, ask questions 3, 4, 5 and 6. If NO to 2, skip to question 6.    3) Have you been thinking about how you might do this? 4) Have you had these thoughts and had some intention of acting on them? High Risk NO   5) Have you started to work out or worked out the details of how to kill yourself? Did you intend to carry out this plan? High Risk NO   Always Ask Question 6  Life-time  Past 3 Months    6) Have you done anything, started to do anything, or prepared to do anything to end your life? Examples: Took pills, tried to shoot yourself, cut yourself, tried to hang yourself, took out pills but didn't swallow any, held a gun but changed your mind or it was grabbed from your hand, went to the roof but didn't jump, collected pills, obtained a gun, gave away valuables, wrote a will or suicide note, etc.   If yes, was this within the past 3 months? High Risk NO       Counseling / Coordination of Care: Total floor / unit time spent today 30 minutes. Greater than 50% of total time was spent with the patient and / or family counseling and / or coordination of care.  A description of the counseling / coordination of care: Direct Patient Care, Chart Review, and Documentation.

## 2023-08-16 NOTE — ED NOTES
Ellis Kim is a 60 y/o male diagnosed with Depression and Anxiety. Patient presented to ED via EMS due to abdominal pain but then stated he was suicidal. Patient appears depressed, cooperative. Patient currently lives with his wife who is taking care of him. Patient broke his knee in January 2023 and since then is unable to fully care for himself without assistance of his wife. This situation put strain on his marriage. Pt states his wife often yells at him, possibly from exhaustion. Pt states he began feeling very depressed 8 months ago after accident, with recent drastic increase. Pt states he daily things about killing himself, as he is tired of his situation, being burden to his wife, and feeling like this. Pt states he keeps a gun under his pillow contemplating on pulling the trigger and ending all. Pt multiple times stated "I just don't want to live". Pt denies HI, self harming, hallucinations. Pt reports decrease in appetite. Pt reports when he takes medication he sleeps but if not he cannot sleep due to intrusive suicidal thoughts. No abuse reported. Currently receives Lexapro from PCP. On and off medication. Crisis discussed the treatment. Pt not open for Inpatient Psychiatric Hospitalization stated "I think I will be fine. I think if I could only get out of the house I would be much better. Doctor told me once that I am feeling this way only temporarily". Pt willing to talk to the psychiatrist. Crisis informed pt about 201, 302. Pt in understating.

## 2023-08-16 NOTE — ED NOTES
Alert and oriented discharged to home pt needing a ride. Round trip to be arranged.  1 to 1 discontinued     Maday Mejia RN  08/16/23 0018

## 2023-08-16 NOTE — ED NOTES
Pt called his wife who is willing to pick him up instead of waiting for round trip transport at [address], RN  08/16/23 0564

## 2023-08-16 NOTE — ED NOTES
Crisis placed psychiatric consult with [facility]. Spoke with Jeff Hinton the psychiatrist on call who will be calling in 10 minutes. IPad set and with the patient sitter. Attending  updated.

## 2023-08-17 ENCOUNTER — TELEPHONE (OUTPATIENT)
Dept: FAMILY MEDICINE CLINIC | Facility: CLINIC | Age: 58
End: 2023-08-17

## 2023-08-17 LAB
ATRIAL RATE: 91 BPM
ATRIAL RATE: 95 BPM
P AXIS: 34 DEGREES
P AXIS: 58 DEGREES
PR INTERVAL: 186 MS
PR INTERVAL: 190 MS
QRS AXIS: -39 DEGREES
QRS AXIS: -48 DEGREES
QRSD INTERVAL: 88 MS
QRSD INTERVAL: 96 MS
QT INTERVAL: 380 MS
QT INTERVAL: 388 MS
QTC INTERVAL: 477 MS
QTC INTERVAL: 477 MS
T WAVE AXIS: 39 DEGREES
T WAVE AXIS: 61 DEGREES
VENTRICULAR RATE: 91 BPM
VENTRICULAR RATE: 95 BPM

## 2023-08-17 PROCEDURE — 93010 ELECTROCARDIOGRAM REPORT: CPT | Performed by: INTERNAL MEDICINE

## 2023-08-17 NOTE — TELEPHONE ENCOUNTER
Spoke with pt to see hows he's doing . Patient reports he's doing fine and has no concerns. Patient has a upcoming appt with you 9/18 . And he's ok with that and does not want to be seen sooner.

## 2023-08-17 NOTE — TELEPHONE ENCOUNTER
----- Message from Vikki Arteaga MA sent at 8/17/2023  9:38 AM EDT -----  Regarding: ED Patient  08/17/23 9:39 AM    Patient has a recent ED visit but report states a Sensitive Diagnosis. Please follow-up with the patient. Thank you.   Nury Anguiano MA  [facility]

## 2023-08-19 DIAGNOSIS — Z87.81 S/P ORIF (OPEN REDUCTION INTERNAL FIXATION) FRACTURE: Primary | ICD-10-CM

## 2023-08-19 DIAGNOSIS — Z98.890 S/P ORIF (OPEN REDUCTION INTERNAL FIXATION) FRACTURE: Primary | ICD-10-CM

## 2023-08-22 ENCOUNTER — APPOINTMENT (OUTPATIENT)
Dept: RADIOLOGY | Facility: CLINIC | Age: 58
End: 2023-08-22
Payer: MEDICARE

## 2023-08-22 ENCOUNTER — OFFICE VISIT (OUTPATIENT)
Dept: OBGYN CLINIC | Facility: CLINIC | Age: 58
End: 2023-08-22
Payer: MEDICARE

## 2023-08-22 VITALS
WEIGHT: 231 LBS | BODY MASS INDEX: 28.13 KG/M2 | DIASTOLIC BLOOD PRESSURE: 86 MMHG | SYSTOLIC BLOOD PRESSURE: 144 MMHG | HEIGHT: 76 IN | HEART RATE: 74 BPM

## 2023-08-22 DIAGNOSIS — Z98.890 S/P ORIF (OPEN REDUCTION INTERNAL FIXATION) FRACTURE: Primary | ICD-10-CM

## 2023-08-22 DIAGNOSIS — Z87.81 S/P ORIF (OPEN REDUCTION INTERNAL FIXATION) FRACTURE: ICD-10-CM

## 2023-08-22 DIAGNOSIS — Z87.81 S/P ORIF (OPEN REDUCTION INTERNAL FIXATION) FRACTURE: Primary | ICD-10-CM

## 2023-08-22 DIAGNOSIS — Z98.890 S/P ORIF (OPEN REDUCTION INTERNAL FIXATION) FRACTURE: ICD-10-CM

## 2023-08-22 PROCEDURE — 73560 X-RAY EXAM OF KNEE 1 OR 2: CPT

## 2023-08-22 PROCEDURE — 99213 OFFICE O/P EST LOW 20 MIN: CPT | Performed by: ORTHOPAEDIC SURGERY

## 2023-08-22 NOTE — PROGRESS NOTES
Orthopaedics Office Visit - Post-op Patient Visit    ASSESSMENT/PLAN:    Assessment:   approx 4 months s/p Right tibial plateau ORIF malunion/nonunion, DOS 5/1/23   With subsequent recollapse, intra-articular hardware  Varus alignment of knee, improved from preop position    Full AROM without pain  No evidence of infection, improved swelling, no redness    Plan:   · The patient's x-rays were reviewed today. · The patient should continue with physical therapy as prescribed. · Continue with home exercise plan. · The patient will call if symptoms worsen. · Continues to decline SEBASTIEN and conversion to TKA  · Advised if hardware begins to fail or break, may be obligated to remove hardware  · Follow-up in 6 weeks for re-evaluation    To Do Next Visit:  X-rays right knee     _____________________________________________________  CHIEF COMPLAINT:  Chief Complaint   Patient presents with   • Right Knee - Follow-up         SUBJECTIVE:  Alexandra Garrison is a 59 y.o. male who presents approximately 4 months status post Right tibial plateau ORIF malunion/nonunion performed on 5/1/23. He is attending physical therapy 3 times per week. He is ambulating with a walker at physical therapy. He is able to stand for 4 minutes at a time at the counter. He was able to ascend stairs independently. He is about 70% improved and continues to progress. The patient admits he does suffer from anxiety.        SOCIAL HISTORY:  Social History     Tobacco Use   • Smoking status: Never   • Smokeless tobacco: Never   Vaping Use   • Vaping Use: Never used   Substance Use Topics   • Alcohol use: Not Currently     Comment: quit 1-2023   • Drug use: Not Currently     Types: Marijuana       MEDICATIONS:    Current Outpatient Medications:   •  acetaminophen (TYLENOL) 325 mg tablet, Take 2 tablets (650 mg total) by mouth every 8 (eight) hours as needed for mild pain, Disp: 60 tablet, Rfl: 0  •  BD PEN NEEDLE DEV U/F 32G X 4 MM MISC, Inject under the skin 4 (four) times a day, Disp: 400 each, Rfl: 1  •  Blood Glucose Monitoring Suppl (ONE TOUCH ULTRA 2) w/Device KIT, Use 2 (two) times a day, Disp: 1 kit, Rfl: 0  •  Blood Glucose Monitoring Suppl (OneTouch Verio Reflect) w/Device KIT, Check blood sugars twice daily. Please substitute with appropriate alternative as covered by patient's insurance. Dx: E11.65, Disp: 1 kit, Rfl: 0  •  clopidogrel (PLAVIX) 75 mg tablet, Take 1 tablet (75 mg total) by mouth daily Do not start before June 12, 2023., Disp: 90 tablet, Rfl: 0  •  escitalopram (LEXAPRO) 5 mg tablet, TAKE 1 TABLET DAILY, Disp: 60 tablet, Rfl: 5  •  ferrous sulfate 325 (65 Fe) mg tablet, Take 1 tablet (325 mg total) by mouth daily at bedtime, Disp: 30 tablet, Rfl: 0  •  glucose blood (OneTouch Verio) test strip, Check blood sugars twice daily. Please substitute with appropriate alternative as covered by patient's insurance. Dx: E11.65, Disp: 200 each, Rfl: 3  •  Insulin Glargine Solostar (Lantus SoloStar) 100 UNIT/ML SOPN, Inject 45 units bid, Disp: 225 mL, Rfl: 0  •  insulin glulisine (Apidra) 100 units/mL injection, Inject 30 units tid with meals, Disp: 60 mL, Rfl: 0  •  levothyroxine 175 mcg tablet, Take 1 tablet (175 mcg total) by mouth daily in the early morning, Disp: 60 tablet, Rfl: 0  •  omeprazole (PriLOSEC) 40 MG capsule, TAKE 1 CAPSULE DAILY (Patient taking differently: 40 mg every morning), Disp: 90 capsule, Rfl: 3  •  ondansetron (ZOFRAN) 4 mg tablet, Take 1 tablet (4 mg total) by mouth every 6 (six) hours as needed for nausea or vomiting, Disp: 15 tablet, Rfl: 0  •  OneTouch Delica Lancets 80G MISC, Check blood sugars twice daily. Please substitute with appropriate alternative as covered by patient's insurance.  Dx: E11.65, Disp: 200 each, Rfl: 3  •  verapamil (VERELAN PM) 360 MG 24 hr capsule, TAKE 1 CAPSULE DAILY AT BEDTIME, Disp: 90 capsule, Rfl: 3  •  zolpidem (AMBIEN) 10 mg tablet, Take 1 tablet (10 mg total) by mouth daily at bedtime as needed for sleep, Disp: 90 tablet, Rfl: 0  •  atorvastatin (LIPITOR) 40 mg tablet, Take 1 tablet (40 mg total) by mouth daily with dinner (Patient taking differently: Take 40 mg by mouth every morning), Disp: 90 tablet, Rfl: 3  •  rivaroxaban (XARELTO) 10 mg tablet, Take 1 tablet (10 mg total) by mouth daily with breakfast for 12 days Do not start before May 31, 2023., Disp: 12 tablet, Rfl: 0    REVIEW OF SYSTEMS:  MSK: right lower extremity deformity  Neuro: WNL  Pertinent items are otherwise noted in HPI. A comprehensive review of systems was otherwise negative.    _____________________________________________________  PHYSICAL EXAMINATION:  Vital signs: /86   Pulse 74   Ht 6' 4" (1.93 m)   Wt 105 kg (231 lb)   BMI 28.12 kg/m²   General: No acute distress, awake and alert  Psychiatric: Mood and affect appear appropriate  HEENT: Trachea Midline, No torticollis, no apparent facial trauma  Cardiovascular: No audible murmurs; Extremities appear perfused  Pulmonary: No audible wheezing or stridor  Skin: No open lesions; see further details (if any) below    MUSCULOSKELETAL EXAMINATION:  Extremities:      Right Knee:   Surgical incisions well healed. There are small scabs located at the central portion of the lateral incision. There is no erythema. There is warmth present  There is moderate effusion. Range of motion from 0 to 100. The patient is able to perform a straight leg raise. The quadriceps actively fires. Calf is soft and nontender. Extremity is warm and well perfused. Sensation is intact. Brisk capillary refill  The patient is neurovascular intact distally.   The patient is in a wheel chair and is non-weightbearing in the office today.     _____________________________________________________  STUDIES REVIEWED:  I personally reviewed the images and interpretation is as follows:    X-rays taken 8/22/23 of right knee demonstrate orthopedic hardware in stable alignment without evidence of loosening or failure. There is progression of varus collapse in comparison to previous films. PROCEDURES PERFORMED:  Procedures  None performed.        Scribe Attestation    I,:  Peng Lea am acting as a scribe while in the presence of the attending physician.:       I,:  Marlene Sifuentes MD personally performed the services described in this documentation    as scribed in my presence.:

## 2023-09-01 DIAGNOSIS — E11.59 TYPE 2 DIABETES MELLITUS WITH OTHER CIRCULATORY COMPLICATION, WITH LONG-TERM CURRENT USE OF INSULIN (HCC): ICD-10-CM

## 2023-09-01 DIAGNOSIS — E11.42 TYPE 2 DIABETES MELLITUS WITH DIABETIC POLYNEUROPATHY, WITH LONG-TERM CURRENT USE OF INSULIN (HCC): ICD-10-CM

## 2023-09-01 DIAGNOSIS — Z79.4 TYPE 2 DIABETES MELLITUS WITH OTHER CIRCULATORY COMPLICATION, WITH LONG-TERM CURRENT USE OF INSULIN (HCC): ICD-10-CM

## 2023-09-01 DIAGNOSIS — Z79.4 TYPE 2 DIABETES MELLITUS WITH DIABETIC POLYNEUROPATHY, WITH LONG-TERM CURRENT USE OF INSULIN (HCC): ICD-10-CM

## 2023-09-01 RX ORDER — INSULIN GLARGINE 100 [IU]/ML
INJECTION, SOLUTION SUBCUTANEOUS
Qty: 225 ML | Refills: 0 | Status: SHIPPED | OUTPATIENT
Start: 2023-09-01

## 2023-09-01 RX ORDER — LANCETS 33 GAUGE
EACH MISCELLANEOUS
Qty: 200 EACH | Refills: 3 | Status: SHIPPED | OUTPATIENT
Start: 2023-09-01

## 2023-09-11 ENCOUNTER — RA CDI HCC (OUTPATIENT)
Dept: OTHER | Facility: HOSPITAL | Age: 58
End: 2023-09-11

## 2023-09-11 NOTE — PROGRESS NOTES
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

## 2023-09-18 ENCOUNTER — OFFICE VISIT (OUTPATIENT)
Dept: FAMILY MEDICINE CLINIC | Facility: CLINIC | Age: 58
End: 2023-09-18
Payer: MEDICARE

## 2023-09-18 VITALS
BODY MASS INDEX: 28.12 KG/M2 | HEART RATE: 41 BPM | TEMPERATURE: 97.3 F | OXYGEN SATURATION: 97 % | SYSTOLIC BLOOD PRESSURE: 148 MMHG | HEIGHT: 76 IN | DIASTOLIC BLOOD PRESSURE: 88 MMHG

## 2023-09-18 DIAGNOSIS — I10 HYPERTENSION, ESSENTIAL: ICD-10-CM

## 2023-09-18 DIAGNOSIS — E03.9 ACQUIRED HYPOTHYROIDISM: ICD-10-CM

## 2023-09-18 DIAGNOSIS — K21.9 GASTROESOPHAGEAL REFLUX DISEASE WITHOUT ESOPHAGITIS: ICD-10-CM

## 2023-09-18 DIAGNOSIS — E78.5 HYPERLIPIDEMIA, UNSPECIFIED HYPERLIPIDEMIA TYPE: ICD-10-CM

## 2023-09-18 DIAGNOSIS — K59.00 CONSTIPATION, UNSPECIFIED CONSTIPATION TYPE: ICD-10-CM

## 2023-09-18 DIAGNOSIS — Z98.890 S/P ORIF (OPEN REDUCTION INTERNAL FIXATION) FRACTURE: ICD-10-CM

## 2023-09-18 DIAGNOSIS — E11.42 TYPE 2 DIABETES MELLITUS WITH DIABETIC POLYNEUROPATHY, WITH LONG-TERM CURRENT USE OF INSULIN (HCC): ICD-10-CM

## 2023-09-18 DIAGNOSIS — R80.1 PERSISTENT PROTEINURIA: ICD-10-CM

## 2023-09-18 DIAGNOSIS — E11.51 TYPE 2 DIABETES MELLITUS WITH DIABETIC PERIPHERAL ANGIOPATHY WITHOUT GANGRENE, WITH LONG-TERM CURRENT USE OF INSULIN (HCC): ICD-10-CM

## 2023-09-18 DIAGNOSIS — Z23 ENCOUNTER FOR VACCINATION: Primary | ICD-10-CM

## 2023-09-18 DIAGNOSIS — G47.33 OSA (OBSTRUCTIVE SLEEP APNEA): ICD-10-CM

## 2023-09-18 DIAGNOSIS — I73.9 PAD (PERIPHERAL ARTERY DISEASE) (HCC): ICD-10-CM

## 2023-09-18 DIAGNOSIS — Z00.00 MEDICARE ANNUAL WELLNESS VISIT, SUBSEQUENT: ICD-10-CM

## 2023-09-18 DIAGNOSIS — Z79.4 TYPE 2 DIABETES MELLITUS WITH DIABETIC POLYNEUROPATHY, WITH LONG-TERM CURRENT USE OF INSULIN (HCC): ICD-10-CM

## 2023-09-18 DIAGNOSIS — N18.32 STAGE 3B CHRONIC KIDNEY DISEASE (HCC): ICD-10-CM

## 2023-09-18 DIAGNOSIS — Z79.4 TYPE 2 DIABETES MELLITUS WITH DIABETIC PERIPHERAL ANGIOPATHY WITHOUT GANGRENE, WITH LONG-TERM CURRENT USE OF INSULIN (HCC): ICD-10-CM

## 2023-09-18 DIAGNOSIS — S82.141D CLOSED FRACTURE OF RIGHT TIBIAL PLATEAU WITH ROUTINE HEALING, SUBSEQUENT ENCOUNTER: ICD-10-CM

## 2023-09-18 DIAGNOSIS — Z87.81 S/P ORIF (OPEN REDUCTION INTERNAL FIXATION) FRACTURE: ICD-10-CM

## 2023-09-18 PROBLEM — R53.81 PHYSICAL DECONDITIONING: Status: RESOLVED | Noted: 2021-12-21 | Resolved: 2023-09-18

## 2023-09-18 PROBLEM — E87.1 HYPONATREMIA: Status: RESOLVED | Noted: 2020-08-04 | Resolved: 2023-09-18

## 2023-09-18 PROBLEM — Z01.818 PREOPERATIVE EXAMINATION: Status: RESOLVED | Noted: 2020-12-03 | Resolved: 2023-09-18

## 2023-09-18 PROBLEM — D62 ACUTE BLOOD LOSS ANEMIA: Status: RESOLVED | Noted: 2020-10-08 | Resolved: 2023-09-18

## 2023-09-18 PROBLEM — E83.42 HYPOMAGNESEMIA: Status: RESOLVED | Noted: 2020-09-09 | Resolved: 2023-09-18

## 2023-09-18 PROCEDURE — 90686 IIV4 VACC NO PRSV 0.5 ML IM: CPT

## 2023-09-18 PROCEDURE — 99214 OFFICE O/P EST MOD 30 MIN: CPT | Performed by: FAMILY MEDICINE

## 2023-09-18 PROCEDURE — G0438 PPPS, INITIAL VISIT: HCPCS | Performed by: FAMILY MEDICINE

## 2023-09-18 PROCEDURE — G0008 ADMIN INFLUENZA VIRUS VAC: HCPCS

## 2023-09-18 RX ORDER — INSULIN GLULISINE 100 [IU]/ML
INJECTION, SOLUTION SUBCUTANEOUS
Qty: 60 ML | Refills: 0
Start: 2023-09-18

## 2023-09-18 RX ORDER — INSULIN GLARGINE 100 [IU]/ML
INJECTION, SOLUTION SUBCUTANEOUS
Qty: 225 ML | Refills: 0
Start: 2023-09-18

## 2023-09-18 NOTE — PATIENT INSTRUCTIONS
Louis, this is Carson Montalvo with [facility].  We are working on your prescription that your doctor has sent us. We will be working with your insurance to get this approved for you. We will be calling you along the way with updates on your medication.  If you have any questions, you can reach us at [phone].    Welcome call outcome: Patient/caregiver reached - spoke with mother Terrie   Medicare Preventive Visit Patient Instructions  Thank you for completing your Welcome to Medicare Visit or Medicare Annual Wellness Visit today. Your next wellness visit will be due in one year (9/18/2024). The screening/preventive services that you may require over the next 5-10 years are detailed below. Some tests may not apply to you based off risk factors and/or age. Screening tests ordered at today's visit but not completed yet may show as past due. Also, please note that scanned in results may not display below. Preventive Screenings:  Service Recommendations Previous Testing/Comments   Colorectal Cancer Screening  · Colonoscopy    · Fecal Occult Blood Test (FOBT)/Fecal Immunochemical Test (FIT)  · Fecal DNA/Cologuard Test  · Flexible Sigmoidoscopy Age: 43-73 years old   Colonoscopy: every 10 years (May be performed more frequently if at higher risk)  OR  FOBT/FIT: every 1 year  OR  Cologuard: every 3 years  OR  Sigmoidoscopy: every 5 years  Screening may be recommended earlier than age 39 if at higher risk for colorectal cancer. Also, an individualized decision between you and your healthcare provider will decide whether screening between the ages of 77-80 would be appropriate.  Colonoscopy: 01/10/2022  FOBT/FIT: Not on file  Cologuard: Not on file  Sigmoidoscopy: Not on file    Screening Current     Prostate Cancer Screening Individualized decision between patient and health care provider in men between ages of 53-66   Medicare will cover every 12 months beginning on the day after your 50th birthday PSA: 0.4 ng/mL     Screening Current     Hepatitis C Screening Once for adults born between 1945 and 1965  More frequently in patients at high risk for Hepatitis C Hep C Antibody: 09/27/2019    Screening Current   Diabetes Screening 1-2 times per year if you're at risk for diabetes or have pre-diabetes Fasting glucose: 180 mg/dL (5/29/2023)  A1C: 7.9 % (4/24/2023)  Screening Not Indicated  History Diabetes Cholesterol Screening Once every 5 years if you don't have a lipid disorder. May order more often based on risk factors. Lipid panel: 04/24/2023  Screening Not Indicated  History Lipid Disorder      Other Preventive Screenings Covered by Medicare:  1. Abdominal Aortic Aneurysm (AAA) Screening: covered once if your at risk. You're considered to be at risk if you have a family history of AAA or a male between the age of 70-76 who smoking at least 100 cigarettes in your lifetime. 2. Lung Cancer Screening: covers low dose CT scan once per year if you meet all of the following conditions: (1) Age 48-67; (2) No signs or symptoms of lung cancer; (3) Current smoker or have quit smoking within the last 15 years; (4) You have a tobacco smoking history of at least 20 pack years (packs per day x number of years you smoked); (5) You get a written order from a healthcare provider. 3. Glaucoma Screening: covered annually if you're considered high risk: (1) You have diabetes OR (2) Family history of glaucoma OR (3)  aged 48 and older OR (3)  American aged 72 and older  3. Osteoporosis Screening: covered every 2 years if you meet one of the following conditions: (1) Have a vertebral abnormality; (2) On glucocorticoid therapy for more than 3 months; (3) Have primary hyperparathyroidism; (4) On osteoporosis medications and need to assess response to drug therapy. 5. HIV Screening: covered annually if you're between the age of 14-79. Also covered annually if you are younger than 13 and older than 72 with risk factors for HIV infection. For pregnant patients, it is covered up to 3 times per pregnancy.     Immunizations:  Immunization Recommendations   Influenza Vaccine Annual influenza vaccination during flu season is recommended for all persons aged >= 6 months who do not have contraindications   Pneumococcal Vaccine   * Pneumococcal conjugate vaccine = PCV13 (Prevnar 13), PCV15 (Vaxneuvance), PCV20 (Prevnar 20)  * Pneumococcal polysaccharide vaccine = PPSV23 (Pneumovax) Adults 2364 years old: 1-3 doses may be recommended based on certain risk factors  Adults 72 years old: 1-2 doses may be recommended based off what pneumonia vaccine you previously received   Hepatitis B Vaccine 3 dose series if at intermediate or high risk (ex: diabetes, end stage renal disease, liver disease)   Tetanus (Td) Vaccine - COST NOT COVERED BY MEDICARE PART B Following completion of primary series, a booster dose should be given every 10 years to maintain immunity against tetanus. Td may also be given as tetanus wound prophylaxis. Tdap Vaccine - COST NOT COVERED BY MEDICARE PART B Recommended at least once for all adults. For pregnant patients, recommended with each pregnancy. Shingles Vaccine (Shingrix) - COST NOT COVERED BY MEDICARE PART B  2 shot series recommended in those aged 48 and above     Health Maintenance Due:      Topic Date Due   • HIV Screening  Never done   • Colorectal Cancer Screening  01/09/2025   • Hepatitis C Screening  Completed     Immunizations Due:      Topic Date Due   • Pneumococcal Vaccine: Pediatrics (0 to 5 Years) and At-Risk Patients (6 to 59 Years) (2 - PCV) 01/01/2007   • Influenza Vaccine (1) 09/01/2023     Advance Directives   What are advance directives? Advance directives are legal documents that state your wishes and plans for medical care. These plans are made ahead of time in case you lose your ability to make decisions for yourself. Advance directives can apply to any medical decision, such as the treatments you want, and if you want to donate organs. What are the types of advance directives? There are many types of advance directives, and each state has rules about how to use them. You may choose a combination of any of the following:  · Living will: This is a written record of the treatment you want.  You can also choose which treatments you do not want, which to limit, and which to stop at a certain time. This includes surgery, medicine, IV fluid, and tube feedings. · Durable power of  for healthcare [facility]): This is a written record that states who you want to make healthcare choices for you when you are unable to make them for yourself. This person, called a proxy, is usually a family member or a friend. You may choose more than 1 proxy. · Do not resuscitate (DNR) order:  A DNR order is used in case your heart stops beating or you stop breathing. It is a request not to have certain forms of treatment, such as CPR. A DNR order may be included in other types of advance directives. · Medical directive: This covers the care that you want if you are in a coma, near death, or unable to make decisions for yourself. You can list the treatments you want for each condition. Treatment may include pain medicine, surgery, blood transfusions, dialysis, IV or tube feedings, and a ventilator (breathing machine). · Values history: This document has questions about your views, beliefs, and how you feel and think about life. This information can help others choose the care that you would choose. Why are advance directives important? An advance directive helps you control your care. Although spoken wishes may be used, it is better to have your wishes written down. Spoken wishes can be misunderstood, or not followed. Treatments may be given even if you do not want them. An advance directive may make it easier for your family to make difficult choices about your care. Weight Management   Why it is important to manage your weight:  Being overweight increases your risk of health conditions such as heart disease, high blood pressure, type 2 diabetes, and certain types of cancer. It can also increase your risk for osteoarthritis, sleep apnea, and other respiratory problems. Aim for a slow, steady weight loss. Even a small amount of weight loss can lower your risk of health problems.   How to lose weight safely:  A safe and healthy way to lose weight is to eat fewer calories and get regular exercise. You can lose up about 1 pound a week by decreasing the number of calories you eat by 500 calories each day. Healthy meal plan for weight management:  A healthy meal plan includes a variety of foods, contains fewer calories, and helps you stay healthy. A healthy meal plan includes the following:  · Eat whole-grain foods more often. A healthy meal plan should contain fiber. Fiber is the part of grains, fruits, and vegetables that is not broken down by your body. Whole-grain foods are healthy and provide extra fiber in your diet. Some examples of whole-grain foods are whole-wheat breads and pastas, oatmeal, brown rice, and bulgur. · Eat a variety of vegetables every day. Include dark, leafy greens such as spinach, kale, dionna greens, and mustard greens. Eat yellow and orange vegetables such as carrots, sweet potatoes, and winter squash. · Eat a variety of fruits every day. Choose fresh or canned fruit (canned in its own juice or light syrup) instead of juice. Fruit juice has very little or no fiber. · Eat low-fat dairy foods. Drink fat-free (skim) milk or 1% milk. Eat fat-free yogurt and low-fat cottage cheese. Try low-fat cheeses such as mozzarella and other reduced-fat cheeses. · Choose meat and other protein foods that are low in fat. Choose beans or other legumes such as split peas or lentils. Choose fish, skinless poultry (chicken or turkey), or lean cuts of red meat (beef or pork). Before you cook meat or poultry, cut off any visible fat. · Use less fat and oil. Try baking foods instead of frying them. Add less fat, such as margarine, sour cream, regular salad dressing and mayonnaise to foods. Eat fewer high-fat foods. Some examples of high-fat foods include french fries, doughnuts, ice cream, and cakes. · Eat fewer sweets. Limit foods and drinks that are high in sugar.  This includes candy, cookies, regular soda, and sweetened drinks. Exercise:  Exercise at least 30 minutes per day on most days of the week. Some examples of exercise include walking, biking, dancing, and swimming. You can also fit in more physical activity by taking the stairs instead of the elevator or parking farther away from stores. Ask your healthcare provider about the best exercise plan for you. © Copyright [employer] 2018 Information is for End User's use only and may not be sold, redistributed or otherwise used for commercial purposes. All illustrations and images included in CareNotes® are the copyrighted property of [employer]). or [facility] Preventive Visit Patient Instructions  Thank you for completing your Welcome to Medicare Visit or Medicare Annual Wellness Visit today. Your next wellness visit will be due in one year (9/18/2024). The screening/preventive services that you may require over the next 5-10 years are detailed below. Some tests may not apply to you based off risk factors and/or age. Screening tests ordered at today's visit but not completed yet may show as past due. Also, please note that scanned in results may not display below. Preventive Screenings:  Service Recommendations Previous Testing/Comments   Colorectal Cancer Screening  · Colonoscopy    · Fecal Occult Blood Test (FOBT)/Fecal Immunochemical Test (FIT)  · Fecal DNA/Cologuard Test  · Flexible Sigmoidoscopy Age: 43-73 years old   Colonoscopy: every 10 years (May be performed more frequently if at higher risk)  OR  FOBT/FIT: every 1 year  OR  Cologuard: every 3 years  OR  Sigmoidoscopy: every 5 years  Screening may be recommended earlier than age 39 if at higher risk for colorectal cancer. Also, an individualized decision between you and your healthcare provider will decide whether screening between the ages of 77-80 would be appropriate.  Colonoscopy: 01/10/2022  FOBT/FIT: Not on file  Cologuard: Not on file  Sigmoidoscopy: Not on file    Screening Current     Prostate Cancer Screening Individualized decision between patient and health care provider in men between ages of 53-66   Medicare will cover every 12 months beginning on the day after your 50th birthday PSA: 0.4 ng/mL     Screening Current     Hepatitis C Screening Once for adults born between 1945 and 1965  More frequently in patients at high risk for Hepatitis C Hep C Antibody: 09/27/2019    Screening Current   Diabetes Screening 1-2 times per year if you're at risk for diabetes or have pre-diabetes Fasting glucose: 180 mg/dL (5/29/2023)  A1C: 7.9 % (4/24/2023)  Screening Not Indicated  History Diabetes   Cholesterol Screening Once every 5 years if you don't have a lipid disorder. May order more often based on risk factors. Lipid panel: 04/24/2023  Screening Not Indicated  History Lipid Disorder      Other Preventive Screenings Covered by Medicare:  6. Abdominal Aortic Aneurysm (AAA) Screening: covered once if your at risk. You're considered to be at risk if you have a family history of AAA or a male between the age of 70-76 who smoking at least 100 cigarettes in your lifetime. 7. Lung Cancer Screening: covers low dose CT scan once per year if you meet all of the following conditions: (1) Age 48-67; (2) No signs or symptoms of lung cancer; (3) Current smoker or have quit smoking within the last 15 years; (4) You have a tobacco smoking history of at least 20 pack years (packs per day x number of years you smoked); (5) You get a written order from a healthcare provider. 8. Glaucoma Screening: covered annually if you're considered high risk: (1) You have diabetes OR (2) Family history of glaucoma OR (3)  aged 48 and older OR (3)  American aged 72 and older  5.  Osteoporosis Screening: covered every 2 years if you meet one of the following conditions: (1) Have a vertebral abnormality; (2) On glucocorticoid therapy for more than 3 months; (3) Have primary hyperparathyroidism; (4) On osteoporosis medications and need to assess response to drug therapy. 10. HIV Screening: covered annually if you're between the age of 14-79. Also covered annually if you are younger than 13 and older than 72 with risk factors for HIV infection. For pregnant patients, it is covered up to 3 times per pregnancy. Immunizations:  Immunization Recommendations   Influenza Vaccine Annual influenza vaccination during flu season is recommended for all persons aged >= 6 months who do not have contraindications   Pneumococcal Vaccine   * Pneumococcal conjugate vaccine = PCV13 (Prevnar 13), PCV15 (Vaxneuvance), PCV20 (Prevnar 20)  * Pneumococcal polysaccharide vaccine = PPSV23 (Pneumovax) Adults 20-63 years old: 1-3 doses may be recommended based on certain risk factors  Adults 72 years old: 1-2 doses may be recommended based off what pneumonia vaccine you previously received   Hepatitis B Vaccine 3 dose series if at intermediate or high risk (ex: diabetes, end stage renal disease, liver disease)   Tetanus (Td) Vaccine - COST NOT COVERED BY MEDICARE PART B Following completion of primary series, a booster dose should be given every 10 years to maintain immunity against tetanus. Td may also be given as tetanus wound prophylaxis. Tdap Vaccine - COST NOT COVERED BY MEDICARE PART B Recommended at least once for all adults. For pregnant patients, recommended with each pregnancy. Shingles Vaccine (Shingrix) - COST NOT COVERED BY MEDICARE PART B  2 shot series recommended in those aged 48 and above     Health Maintenance Due:      Topic Date Due   • HIV Screening  Never done   • Colorectal Cancer Screening  01/09/2025   • Hepatitis C Screening  Completed     Immunizations Due:      Topic Date Due   • Pneumococcal Vaccine: Pediatrics (0 to 5 Years) and At-Risk Patients (6 to 59 Years) (2 - PCV) 01/01/2007     Advance Directives   What are advance directives?   Advance directives are legal documents that state your wishes and plans for medical care. These plans are made ahead of time in case you lose your ability to make decisions for yourself. Advance directives can apply to any medical decision, such as the treatments you want, and if you want to donate organs. What are the types of advance directives? There are many types of advance directives, and each state has rules about how to use them. You may choose a combination of any of the following:  · Living will: This is a written record of the treatment you want. You can also choose which treatments you do not want, which to limit, and which to stop at a certain time. This includes surgery, medicine, IV fluid, and tube feedings. · Durable power of  for healthcare [facility]): This is a written record that states who you want to make healthcare choices for you when you are unable to make them for yourself. This person, called a proxy, is usually a family member or a friend. You may choose more than 1 proxy. · Do not resuscitate (DNR) order:  A DNR order is used in case your heart stops beating or you stop breathing. It is a request not to have certain forms of treatment, such as CPR. A DNR order may be included in other types of advance directives. · Medical directive: This covers the care that you want if you are in a coma, near death, or unable to make decisions for yourself. You can list the treatments you want for each condition. Treatment may include pain medicine, surgery, blood transfusions, dialysis, IV or tube feedings, and a ventilator (breathing machine). · Values history: This document has questions about your views, beliefs, and how you feel and think about life. This information can help others choose the care that you would choose. Why are advance directives important? An advance directive helps you control your care.  Although spoken wishes may be used, it is better to have your wishes written down. Spoken wishes can be misunderstood, or not followed. Treatments may be given even if you do not want them. An advance directive may make it easier for your family to make difficult choices about your care. Weight Management   Why it is important to manage your weight:  Being overweight increases your risk of health conditions such as heart disease, high blood pressure, type 2 diabetes, and certain types of cancer. It can also increase your risk for osteoarthritis, sleep apnea, and other respiratory problems. Aim for a slow, steady weight loss. Even a small amount of weight loss can lower your risk of health problems. How to lose weight safely:  A safe and healthy way to lose weight is to eat fewer calories and get regular exercise. You can lose up about 1 pound a week by decreasing the number of calories you eat by 500 calories each day. Healthy meal plan for weight management:  A healthy meal plan includes a variety of foods, contains fewer calories, and helps you stay healthy. A healthy meal plan includes the following:  · Eat whole-grain foods more often. A healthy meal plan should contain fiber. Fiber is the part of grains, fruits, and vegetables that is not broken down by your body. Whole-grain foods are healthy and provide extra fiber in your diet. Some examples of whole-grain foods are whole-wheat breads and pastas, oatmeal, brown rice, and bulgur. · Eat a variety of vegetables every day. Include dark, leafy greens such as spinach, kale, dionna greens, and mustard greens. Eat yellow and orange vegetables such as carrots, sweet potatoes, and winter squash. · Eat a variety of fruits every day. Choose fresh or canned fruit (canned in its own juice or light syrup) instead of juice. Fruit juice has very little or no fiber. · Eat low-fat dairy foods. Drink fat-free (skim) milk or 1% milk. Eat fat-free yogurt and low-fat cottage cheese.  Try low-fat cheeses such as mozzarella and other reduced-fat cheeses. · Choose meat and other protein foods that are low in fat. Choose beans or other legumes such as split peas or lentils. Choose fish, skinless poultry (chicken or turkey), or lean cuts of red meat (beef or pork). Before you cook meat or poultry, cut off any visible fat. · Use less fat and oil. Try baking foods instead of frying them. Add less fat, such as margarine, sour cream, regular salad dressing and mayonnaise to foods. Eat fewer high-fat foods. Some examples of high-fat foods include french fries, doughnuts, ice cream, and cakes. · Eat fewer sweets. Limit foods and drinks that are high in sugar. This includes candy, cookies, regular soda, and sweetened drinks. Exercise:  Exercise at least 30 minutes per day on most days of the week. Some examples of exercise include walking, biking, dancing, and swimming. You can also fit in more physical activity by taking the stairs instead of the elevator or parking farther away from stores. Ask your healthcare provider about the best exercise plan for you. © Copyright [address] 2018 Information is for End User's use only and may not be sold, redistributed or otherwise used for commercial purposes. All illustrations and images included in CareNotes® are the copyrighted property of [employer]. or [facility] Preventive Visit Patient Instructions  Thank you for completing your Welcome to Medicare Visit or Medicare Annual Wellness Visit today. Your next wellness visit will be due in one year (9/18/2024). The screening/preventive services that you may require over the next 5-10 years are detailed below. Some tests may not apply to you based off risk factors and/or age. Screening tests ordered at today's visit but not completed yet may show as past due. Also, please note that scanned in results may not display below.   Preventive Screenings:  Service Recommendations Previous Testing/Comments   Colorectal Cancer Screening  · Colonoscopy    · Fecal Occult Blood Test (FOBT)/Fecal Immunochemical Test (FIT)  · Fecal DNA/Cologuard Test  · Flexible Sigmoidoscopy Age: 43-73 years old   Colonoscopy: every 10 years (May be performed more frequently if at higher risk)  OR  FOBT/FIT: every 1 year  OR  Cologuard: every 3 years  OR  Sigmoidoscopy: every 5 years  Screening may be recommended earlier than age 39 if at higher risk for colorectal cancer. Also, an individualized decision between you and your healthcare provider will decide whether screening between the ages of 77-80 would be appropriate. Colonoscopy: 01/10/2022  FOBT/FIT: Not on file  Cologuard: Not on file  Sigmoidoscopy: Not on file    Screening Current     Prostate Cancer Screening Individualized decision between patient and health care provider in men between ages of 53-66   Medicare will cover every 12 months beginning on the day after your 50th birthday PSA: 0.4 ng/mL     Screening Current     Hepatitis C Screening Once for adults born between 1945 and 1965  More frequently in patients at high risk for Hepatitis C Hep C Antibody: 09/27/2019    Screening Current   Diabetes Screening 1-2 times per year if you're at risk for diabetes or have pre-diabetes Fasting glucose: 180 mg/dL (5/29/2023)  A1C: 7.9 % (4/24/2023)  Screening Not Indicated  History Diabetes   Cholesterol Screening Once every 5 years if you don't have a lipid disorder. May order more often based on risk factors. Lipid panel: 04/24/2023  Screening Not Indicated  History Lipid Disorder      Other Preventive Screenings Covered by Medicare:  11. Abdominal Aortic Aneurysm (AAA) Screening: covered once if your at risk. You're considered to be at risk if you have a family history of AAA or a male between the age of 70-76 who smoking at least 100 cigarettes in your lifetime.   12. Lung Cancer Screening: covers low dose CT scan once per year if you meet all of the following conditions: (1) Age 48-67; (2) No signs or symptoms of lung cancer; (3) Current smoker or have quit smoking within the last 15 years; (4) You have a tobacco smoking history of at least 20 pack years (packs per day x number of years you smoked); (5) You get a written order from a healthcare provider. 15. Glaucoma Screening: covered annually if you're considered high risk: (1) You have diabetes OR (2) Family history of glaucoma OR (3)  aged 48 and older OR (3)  American aged 72 and older  15. Osteoporosis Screening: covered every 2 years if you meet one of the following conditions: (1) Have a vertebral abnormality; (2) On glucocorticoid therapy for more than 3 months; (3) Have primary hyperparathyroidism; (4) On osteoporosis medications and need to assess response to drug therapy. 15. HIV Screening: covered annually if you're between the age of 14-79. Also covered annually if you are younger than 13 and older than 72 with risk factors for HIV infection. For pregnant patients, it is covered up to 3 times per pregnancy. Immunizations:  Immunization Recommendations   Influenza Vaccine Annual influenza vaccination during flu season is recommended for all persons aged >= 6 months who do not have contraindications   Pneumococcal Vaccine   * Pneumococcal conjugate vaccine = PCV13 (Prevnar 13), PCV15 (Vaxneuvance), PCV20 (Prevnar 20)  * Pneumococcal polysaccharide vaccine = PPSV23 (Pneumovax) Adults 20-63 years old: 1-3 doses may be recommended based on certain risk factors  Adults 72 years old: 1-2 doses may be recommended based off what pneumonia vaccine you previously received   Hepatitis B Vaccine 3 dose series if at intermediate or high risk (ex: diabetes, end stage renal disease, liver disease)   Tetanus (Td) Vaccine - COST NOT COVERED BY MEDICARE PART B Following completion of primary series, a booster dose should be given every 10 years to maintain immunity against tetanus. Td may also be given as tetanus wound prophylaxis. Tdap Vaccine - COST NOT COVERED BY MEDICARE PART B Recommended at least once for all adults. For pregnant patients, recommended with each pregnancy. Shingles Vaccine (Shingrix) - COST NOT COVERED BY MEDICARE PART B  2 shot series recommended in those aged 48 and above     Health Maintenance Due:      Topic Date Due   • HIV Screening  Never done   • Colorectal Cancer Screening  01/09/2025   • Hepatitis C Screening  Completed     Immunizations Due:      Topic Date Due   • Pneumococcal Vaccine: Pediatrics (0 to 5 Years) and At-Risk Patients (6 to 59 Years) (2 - PCV) 01/01/2007     Advance Directives   What are advance directives? Advance directives are legal documents that state your wishes and plans for medical care. These plans are made ahead of time in case you lose your ability to make decisions for yourself. Advance directives can apply to any medical decision, such as the treatments you want, and if you want to donate organs. What are the types of advance directives? There are many types of advance directives, and each state has rules about how to use them. You may choose a combination of any of the following:  · Living will: This is a written record of the treatment you want. You can also choose which treatments you do not want, which to limit, and which to stop at a certain time. This includes surgery, medicine, IV fluid, and tube feedings. · Durable power of  for healthcare [facility]): This is a written record that states who you want to make healthcare choices for you when you are unable to make them for yourself. This person, called a proxy, is usually a family member or a friend. You may choose more than 1 proxy. · Do not resuscitate (DNR) order:  A DNR order is used in case your heart stops beating or you stop breathing. It is a request not to have certain forms of treatment, such as CPR. A DNR order may be included in other types of advance directives. · Medical directive:   This covers the care that you want if you are in a coma, near death, or unable to make decisions for yourself. You can list the treatments you want for each condition. Treatment may include pain medicine, surgery, blood transfusions, dialysis, IV or tube feedings, and a ventilator (breathing machine). · Values history: This document has questions about your views, beliefs, and how you feel and think about life. This information can help others choose the care that you would choose. Why are advance directives important? An advance directive helps you control your care. Although spoken wishes may be used, it is better to have your wishes written down. Spoken wishes can be misunderstood, or not followed. Treatments may be given even if you do not want them. An advance directive may make it easier for your family to make difficult choices about your care. Weight Management   Why it is important to manage your weight:  Being overweight increases your risk of health conditions such as heart disease, high blood pressure, type 2 diabetes, and certain types of cancer. It can also increase your risk for osteoarthritis, sleep apnea, and other respiratory problems. Aim for a slow, steady weight loss. Even a small amount of weight loss can lower your risk of health problems. How to lose weight safely:  A safe and healthy way to lose weight is to eat fewer calories and get regular exercise. You can lose up about 1 pound a week by decreasing the number of calories you eat by 500 calories each day. Healthy meal plan for weight management:  A healthy meal plan includes a variety of foods, contains fewer calories, and helps you stay healthy. A healthy meal plan includes the following:  · Eat whole-grain foods more often. A healthy meal plan should contain fiber. Fiber is the part of grains, fruits, and vegetables that is not broken down by your body. Whole-grain foods are healthy and provide extra fiber in your diet.  Some examples of whole-grain foods are whole-wheat breads and pastas, oatmeal, brown rice, and bulgur. · Eat a variety of vegetables every day. Include dark, leafy greens such as spinach, kale, dionna greens, and mustard greens. Eat yellow and orange vegetables such as carrots, sweet potatoes, and winter squash. · Eat a variety of fruits every day. Choose fresh or canned fruit (canned in its own juice or light syrup) instead of juice. Fruit juice has very little or no fiber. · Eat low-fat dairy foods. Drink fat-free (skim) milk or 1% milk. Eat fat-free yogurt and low-fat cottage cheese. Try low-fat cheeses such as mozzarella and other reduced-fat cheeses. · Choose meat and other protein foods that are low in fat. Choose beans or other legumes such as split peas or lentils. Choose fish, skinless poultry (chicken or turkey), or lean cuts of red meat (beef or pork). Before you cook meat or poultry, cut off any visible fat. · Use less fat and oil. Try baking foods instead of frying them. Add less fat, such as margarine, sour cream, regular salad dressing and mayonnaise to foods. Eat fewer high-fat foods. Some examples of high-fat foods include french fries, doughnuts, ice cream, and cakes. · Eat fewer sweets. Limit foods and drinks that are high in sugar. This includes candy, cookies, regular soda, and sweetened drinks. Exercise:  Exercise at least 30 minutes per day on most days of the week. Some examples of exercise include walking, biking, dancing, and swimming. You can also fit in more physical activity by taking the stairs instead of the elevator or parking farther away from stores. Ask your healthcare provider about the best exercise plan for you. © Copyright "[employer]" 2018 Information is for End User's use only and may not be sold, redistributed or otherwise used for commercial purposes.  All illustrations and images included in CareNotes® are the copyrighted property of [employer] or [employer]

## 2023-09-18 NOTE — PROGRESS NOTES
Assessment and Plan:   Turn visit in 4 months. We will call with results of his blood work. Problem List Items Addressed This Visit        Digestive    Gastroesophageal reflux disease without esophagitis       Endocrine    HHS vs DKA    Relevant Medications    Insulin Glargine Solostar (Lantus SoloStar) 100 UNIT/ML SOPN    insulin glulisine (Apidra) 100 units/mL injection    Acquired hypothyroidism     Continue levothyroxine 175 mcg daily         Type 2 diabetes mellitus with diabetic peripheral angiopathy without gangrene (HCC)    Relevant Medications    Insulin Glargine Solostar (Lantus SoloStar) 100 UNIT/ML SOPN    insulin glulisine (Apidra) 100 units/mL injection    Other Relevant Orders    Hemoglobin A1C    Comprehensive metabolic panel    Albumin / creatinine urine ratio       Respiratory    MANNY (obstructive sleep apnea)       Cardiovascular and Mediastinum    Hypertension, essential    PAD (peripheral artery disease) (HCC)       Musculoskeletal and Integument    Closed fracture of right tibial plateau       Genitourinary    Stage 3b chronic kidney disease (HCC)       Other    Hyperlipidemia     Continue Lipitor 40 mg daily         Relevant Orders    CBC and differential    Lipid Panel with Direct LDL reflex    Persistent proteinuria    Constipation     Colace 100 mg 3 times daily. Continue glycerin suppository as needed. S/P ORIF (open reduction internal fixation) fracture   Other Visit Diagnoses     Encounter for vaccination    -  Primary    Relevant Orders    influenza vaccine, quadrivalent, 0.5 mL, preservative-free, for adult and pediatric patients 6 mos+ (AFLURIA, FLUARIX, FLULAVAL, FLUZONE) (Completed)    Medicare annual wellness visit, subsequent               Preventive health issues were discussed with patient, and age appropriate screening tests were ordered as noted in patient's After Visit Summary.   Personalized health advice and appropriate referrals for health education or preventive services given if needed, as noted in patient's After Visit Summary. History of Present Illness:     Patient presents for a Medicare Wellness Visit    Patient comes in for checkup. He has not been here in several months due to current right tibial plateau fracture. After ORIF of the original fracture he was in inpatient physical therapy when he refractured his tibial plateau. He complains of constipation which is fairly chronic. His blood sugars have been under good control. He is receiving physical therapy. Patient Care Team:  Alyce Banuelos MD as PCP - General (Family Medicine)  Diane Peacock MD (Endocrinology)  Paulino Miller PA-C as Physician Assistant (Physician Assistant)  Rhonda Fofana MD (Nephrology)     Review of Systems:     Review of Systems   Constitutional: Negative. Respiratory: Negative. Cardiovascular: Negative. Musculoskeletal: Positive for gait problem.         Problem List:     Patient Active Problem List   Diagnosis   • Hypertension, essential   • Gastroesophageal reflux disease without esophagitis   • Hyperlipidemia   • History of nonadherence to medical treatment   • HHS vs DKA   • Right-sided tinnitus   • Colon polyps   • Elevated liver enzymes   • PAD (peripheral artery disease) (HCC)   • Thyroid cancer (HCC)   • Urinary retention   • Abnormal EKG   • Insomnia   • Persistent proteinuria   • Acquired absence of right foot (HCC)   • Acquired hypothyroidism   • Type 2 diabetes mellitus with diabetic peripheral angiopathy without gangrene ([address])   • Dupuytren's contracture of right hand   • Stage 3b chronic kidney disease (HCC)   • Hyperkalemia   • Alcoholism ([address])   • Closed fracture of right tibial plateau   • MARKELL (acute kidney injury) ([address])   • Chronic kidney disease-mineral and bone disorder   • Acquired genu varum of right lower extremity   • Depression and anxiety   • Constipation   • Closed fracture of medial plateau of right tibia   • MANNY (obstructive sleep apnea)   • S/P ORIF (open reduction internal fixation) fracture      Past Medical and Surgical History:     Past Medical History:   Diagnosis Date   • Broken internal right knee prosthesis ([address]) 01/2023   • Chronic kidney disease    • Colon polyp    • Constipation 03/09/2023   • Diabetes mellitus ([address])    • Disease of thyroid gland    • GERD (gastroesophageal reflux disease)    • HHS vs DKA 11/16/2018   • Hyperlipidemia    • Hypertension    • Thyroid cancer ([address])      Past Surgical History:   Procedure Laterality Date   • COLONOSCOPY     • FOOT AMPUTATION Right    • FOOT SURGERY Right 2014   • IR AORTAGRAM WITH RUN-OFF  08/06/2020   • IR TUNNELED CENTRAL LINE PLACEMENT  09/08/2020   • IR TUNNELED CENTRAL LINE REMOVAL  09/28/2020   • NC AMPUTATION FOOT TRANSMETARSAL Right 09/03/2020    Procedure: AMPUTATION TRANSMETATARSAL (TMA);  Surgeon: Maylin Car DPM;  Location: [facility];  Service: Podiatry   • NC OPEN [place] UNICONDYLAR Right 5/1/2023    Procedure: OPEN REDUCTION W/ INTERNAL FIXATION (ORIF) RIGHT TIBIAL PLATEAU NONUNION;  Surgeon: Bryanna Balbuena MD;  Location: [facility];  Service: Orthopedics   • NC THYROIDECTOMY TOTAL/COMPLETE N/A 02/03/2021    Procedure: TOTAL THYROIDECTOMY WITH NIMS MONITORING;  Surgeon: Benito Dunn MD;  [facility];  Service: ENT   • TOE AMPUTATION Right 08/11/2020    Procedure: AMPUTATION TOE;  Surgeon: Maylin Car DPM;  Location: [facility];  Service: Podiatry   • US GUIDED THYROID BIOPSY  07/02/2020      Family History:     Family History   Problem Relation Age of Onset   • Cancer Mother    • Hypertension Father       Social History:     Social History     Socioeconomic History   • Marital status: Single     Spouse name: None   • Number of children: None   • Years of education: None   • Highest education level: None   Occupational History   • None   Tobacco Use   • Smoking status: Never   • Smokeless tobacco: Never   Vaping Use   • Vaping Use: Never used   Substance and Sexual Activity   • Alcohol use: Not Currently     Comment: quit 1-2023   • Drug use: Not Currently     Types: Marijuana   • Sexual activity: Not Currently     Partners: Female   Other Topics Concern   • None   Social History Narrative    Light caffeine     Wears seatbelt daily    Lives with girlfriend     Social Determinants of Health     Financial Resource Strain: Low Risk  (9/18/2023)    Overall Financial Resource Strain (CARDIA)    • Difficulty of Paying Living Expenses: Not hard at all   Food Insecurity: No Food Insecurity (5/5/2023)    Hunger Vital Sign    • Worried About Running Out of Food in the Last Year: Never true    • Ran Out of Food in the Last Year: Never true   Transportation Needs: No Transportation Needs (9/18/2023)    PRAPARE - Transportation    • Lack of Transportation (Medical): No    • Lack of Transportation (Non-Medical):  No   Physical Activity: Not on file   Stress: Not on file   Social Connections: Not on file   Intimate Partner Violence: Not on file   Housing Stability: Low Risk  (5/5/2023)    Housing Stability Vital Sign    • Unable to Pay for Housing in the Last Year: No    • Number of Places Lived in the Last Year: 1    • Unstable Housing in the Last Year: No      Medications and Allergies:     Current Outpatient Medications   Medication Sig Dispense Refill   • BD PEN NEEDLE DEV U/F 32G X 4 MM MISC Inject under the skin 4 (four) times a day 400 each 1   • Blood Glucose Monitoring Suppl (ONE TOUCH ULTRA 2) w/Device KIT Use 2 (two) times a day 1 kit 0   • escitalopram (LEXAPRO) 5 mg tablet TAKE 1 TABLET DAILY 60 tablet 5   • ferrous sulfate 325 (65 Fe) mg tablet Take 1 tablet (325 mg total) by mouth daily at bedtime 30 tablet 0   • Insulin Glargine Solostar (Lantus SoloStar) 100 UNIT/ML SOPN 30 units bid 225 mL 0   • insulin glulisine (Apidra) 100 units/mL injection Us via sliding scale tid 60 mL 0   • levothyroxine 175 mcg tablet Take 1 tablet (175 mcg total) by mouth daily in the early morning 60 tablet 0   • omeprazole (PriLOSEC) 40 MG capsule TAKE 1 CAPSULE DAILY 90 capsule 3   • verapamil (VERELAN PM) 360 MG 24 hr capsule TAKE 1 CAPSULE DAILY AT BEDTIME 90 capsule 3   • zolpidem (AMBIEN) 10 mg tablet Take 1 tablet (10 mg total) by mouth daily at bedtime as needed for sleep 90 tablet 0   • acetaminophen (TYLENOL) 325 mg tablet Take 2 tablets (650 mg total) by mouth every 8 (eight) hours as needed for mild pain 60 tablet 0   • atorvastatin (LIPITOR) 40 mg tablet Take 1 tablet (40 mg total) by mouth daily with dinner (Patient taking differently: Take 40 mg by mouth every morning) 90 tablet 3   • ondansetron (ZOFRAN) 4 mg tablet Take 1 tablet (4 mg total) by mouth every 6 (six) hours as needed for nausea or vomiting 15 tablet 0     No current facility-administered medications for this visit.      Allergies   Allergen Reactions   • Vancomycin Other (See Comments)     Kidney issues   • Pollen Extract Eye Swelling     Eyes get watery       Immunizations:     Immunization History   Administered Date(s) Administered   • COVID-19 PFIZER VACCINE 0.3 ML IM 03/29/2021, 04/21/2021, 10/22/2021   • COVID-19 Pfizer Vac BIVALENT Monster-sucrose 12 Yr+ IM 11/15/2022   • DTaP 01/01/2005   • INFLUENZA 10/20/2007, 10/01/2008, 10/04/2009, 09/20/2011, 10/15/2012, 09/02/2013, 11/02/2015, 02/01/2016, 10/15/2016, 10/01/2017, 11/01/2019   • Influenza, injectable, quadrivalent, preservative free 0.5 mL 09/18/2023   • Influenza, recombinant, quadrivalent,injectable, preservative free 12/03/2020, 10/18/2021, 11/15/2022   • Pneumococcal Polysaccharide PPV23 01/01/2006   • Tdap 09/19/2008      Health Maintenance:         Topic Date Due   • HIV Screening  Never done   • Colorectal Cancer Screening  01/09/2025   • Hepatitis C Screening  Completed         Topic Date Due   • Pneumococcal Vaccine: Pediatrics (0 to 5 Years) and At-Risk Patients (6 to 59 Years) (2 - PCV) 01/01/2007      Medicare Screening Tests and Risk Assessments:     Derrick Menjivar is here for his Subsequent Wellness visit. Last Medicare Wellness visit information reviewed, patient interviewed and updates made to the record today. Health Risk Assessment:   Patient rates overall health as good. Patient feels that their physical health rating is slightly worse. Patient is satisfied with their life. Eyesight was rated as slightly worse. Hearing was rated as same. Patient feels that their emotional and mental health rating is same. Patients states they are sometimes angry. Patient states they are sometimes unusually tired/fatigued. Pain experienced in the last 7 days has been some. Patient's pain rating has been 1/10. Patient states that he has experienced no weight loss or gain in last 6 months. Depression Screening:   PHQ-9 Score: 0      Fall Risk Screening: In the past year, patient has experienced: no history of falling in past year      Home Safety:  Patient has trouble with stairs inside or outside of their home. Patient has working smoke alarms and has working carbon monoxide detector. Home safety hazards include: none. Nutrition:   Current diet is Regular. Medications:   Patient is not currently taking any over-the-counter supplements. Patient is able to manage medications. Activities of Daily Living (ADLs)/Instrumental Activities of Daily Living (IADLs):   Walk and transfer into and out of bed and chair?: Yes  Dress and groom yourself?: Yes    Bathe or shower yourself?: Yes    Feed yourself?  Yes  Do your laundry/housekeeping?: Yes  Manage your money, pay your bills and track your expenses?: Yes  Make your own meals?: Yes    Do your own shopping?: Yes    Previous Hospitalizations:   Any hospitalizations or ED visits within the last 12 months?: Yes    How many hospitalizations have you had in the last year?: 1-2    Advance Care Planning:   Living will: No    Durable POA for healthcare: No    Advanced directive: No Advanced directive counseling given: Yes    Five wishes given: Yes    End of Life Decisions reviewed with patient: Yes    Provider agrees with end of life decisions: Yes      PREVENTIVE SCREENINGS      Cardiovascular Screening:    General: Screening Not Indicated and History Lipid Disorder      Diabetes Screening:     General: Screening Not Indicated and History Diabetes      Colorectal Cancer Screening:     General: Screening Current      Prostate Cancer Screening:    General: Screening Current      Osteoporosis Screening:    General: Screening Not Indicated      Abdominal Aortic Aneurysm (AAA) Screening:        General: Screening Not Indicated      Lung Cancer Screening:     General: Screening Not Indicated      Hepatitis C Screening:    General: Screening Current    Screening, Brief Intervention, and Referral to Treatment (SBIRT)    Screening  Typical number of drinks in a day: 0  Typical number of drinks in a week: 0  Interpretation: Low risk drinking behavior. Single Item Drug Screening:  How often have you used an illegal drug (including marijuana) or a prescription medication for non-medical reasons in the past year? never    Single Item Drug Screen Score: 0  Interpretation: Negative screen for possible drug use disorder    Other Counseling Topics:   Car/seat belt/driving safety. No results found. Physical Exam:     /88 (BP Location: Left arm, Patient Position: Sitting, Cuff Size: Standard)   Pulse (!) 41   Temp (!) 97.3 °F (36.3 °C) (Tympanic)   Ht 6' 4" (1.93 m)   SpO2 97%   BMI 28.12 kg/m²     Physical Exam  Vitals and nursing note reviewed. Constitutional:       Appearance: Normal appearance. He is well-developed. HENT:      Head: Normocephalic and atraumatic. Eyes:      Conjunctiva/sclera: Conjunctivae normal.   Cardiovascular:      Rate and Rhythm: Normal rate and regular rhythm. Pulses: Pulses are weak. Dorsalis pedis pulses are 0 on the left side. Posterior tibial pulses are 0 on the left side. Heart sounds: Normal heart sounds. Pulmonary:      Effort: Pulmonary effort is normal.      Breath sounds: Normal breath sounds. Musculoskeletal:         General: No swelling. Cervical back: Neck supple. Comments: Wheelchair-bound   Feet:      Right foot: amputated     Left foot:      Skin integrity: No ulcer, skin breakdown, erythema, warmth, callus or dry skin. Skin:     General: Skin is warm and dry. Capillary Refill: Capillary refill takes less than 2 seconds. Neurological:      Mental Status: He is alert. Psychiatric:         Mood and Affect: Mood normal.        Diabetic Foot Exam    Patient's shoes and socks were not removed. Right Foot/Ankle   Right Foot Inspection  Amputation: amputation right foot     Left Foot/Ankle  Left Foot Inspection  Skin Exam: skin normal and skin intact. No dry skin, no warmth, no erythema, no maceration, normal color, no pre-ulcer, no ulcer and no callus. Sensory   Vibration: diminished  Proprioception: diminished  Monofilament testing: diminished    Vascular  The left DP pulse is 0. The left PT pulse is 0.      Assign Risk Category  No deformity present  Loss of protective sensation  Weak pulses  Risk: 2    Renato Morales MD

## 2023-09-19 ENCOUNTER — TELEPHONE (OUTPATIENT)
Dept: FAMILY MEDICINE CLINIC | Facility: CLINIC | Age: 58
End: 2023-09-19

## 2023-09-19 DIAGNOSIS — Z12.5 PROSTATE CANCER SCREENING: Primary | ICD-10-CM

## 2023-09-29 DIAGNOSIS — Z87.81 S/P ORIF (OPEN REDUCTION INTERNAL FIXATION) FRACTURE: Primary | ICD-10-CM

## 2023-09-29 DIAGNOSIS — Z98.890 S/P ORIF (OPEN REDUCTION INTERNAL FIXATION) FRACTURE: Primary | ICD-10-CM

## 2023-10-03 ENCOUNTER — APPOINTMENT (OUTPATIENT)
Dept: RADIOLOGY | Facility: CLINIC | Age: 58
End: 2023-10-03
Payer: MEDICARE

## 2023-10-03 ENCOUNTER — OFFICE VISIT (OUTPATIENT)
Dept: OBGYN CLINIC | Facility: CLINIC | Age: 58
End: 2023-10-03
Payer: MEDICARE

## 2023-10-03 VITALS
BODY MASS INDEX: 28.13 KG/M2 | WEIGHT: 231 LBS | HEART RATE: 70 BPM | SYSTOLIC BLOOD PRESSURE: 101 MMHG | HEIGHT: 76 IN | DIASTOLIC BLOOD PRESSURE: 63 MMHG

## 2023-10-03 DIAGNOSIS — Z98.890 S/P ORIF (OPEN REDUCTION INTERNAL FIXATION) FRACTURE: ICD-10-CM

## 2023-10-03 DIAGNOSIS — Z98.890 S/P ORIF (OPEN REDUCTION INTERNAL FIXATION) FRACTURE: Primary | ICD-10-CM

## 2023-10-03 DIAGNOSIS — Z87.81 S/P ORIF (OPEN REDUCTION INTERNAL FIXATION) FRACTURE: Primary | ICD-10-CM

## 2023-10-03 DIAGNOSIS — Z87.81 S/P ORIF (OPEN REDUCTION INTERNAL FIXATION) FRACTURE: ICD-10-CM

## 2023-10-03 PROCEDURE — 73560 X-RAY EXAM OF KNEE 1 OR 2: CPT

## 2023-10-03 PROCEDURE — 99213 OFFICE O/P EST LOW 20 MIN: CPT | Performed by: ORTHOPAEDIC SURGERY

## 2023-10-03 NOTE — PROGRESS NOTES
Orthopaedics Office Visit - Post-op Patient Visit    ASSESSMENT/PLAN:    Assessment:   -5 months s/p Right tibial plateau ORIF malunion/nonunion, DOS 5/1/23   -With subsequent known collapse, intra-articular hardware however maintained alignment from previous xray  -Varus alignment of knee, improved from preop position    Able to ambulate in TROM, slowly gaining quad strength    Plan:   · The patient's x-rays were reviewed today. · The patient should continue with home physical therapy. Continue to progress weightbearing status as tolerated. · Continue home exercise plan. · Surgery is not indicated at this time but may be revisited in the future. · Follow-up in 3 months    To Do Next Visit:  Re-evaluation, right knee x-rays     _____________________________________________________  CHIEF COMPLAINT:  Chief Complaint   Patient presents with   • Right Knee - Follow-up         SUBJECTIVE:  Leila Myrick is a 59 y.o. male who presents 5 months status post Right tibial plateau ORIF malunion/nonunion performed on 5/1/23. The patient was last seen in the office on 8/22/23 where he was advised to continue with physical therapy and had discussions regarding future surgical needs. He walks when physical therapy is at his house twice a week. He also walks in his living room with the walker when someone is home. He denies any pain today. He has soreness following his physical therapy sessions. He continues to wear his TROM when mobile. He reports to the office today in a wheelchair.       SOCIAL HISTORY:  Social History     Tobacco Use   • Smoking status: Never   • Smokeless tobacco: Never   Vaping Use   • Vaping Use: Never used   Substance Use Topics   • Alcohol use: Not Currently     Comment: quit 1-2023   • Drug use: Not Currently     Types: Marijuana       MEDICATIONS:    Current Outpatient Medications:   •  BD PEN NEEDLE DEV U/F 32G X 4 MM MISC, Inject under the skin 4 (four) times a day, Disp: 400 each, Rfl: 1  •  Blood Glucose Monitoring Suppl (ONE TOUCH ULTRA 2) w/Device KIT, Use 2 (two) times a day, Disp: 1 kit, Rfl: 0  •  escitalopram (LEXAPRO) 5 mg tablet, TAKE 1 TABLET DAILY, Disp: 60 tablet, Rfl: 5  •  ferrous sulfate 325 (65 Fe) mg tablet, Take 1 tablet (325 mg total) by mouth daily at bedtime, Disp: 30 tablet, Rfl: 0  •  Insulin Glargine Solostar (Lantus SoloStar) 100 UNIT/ML SOPN, 30 units bid, Disp: 225 mL, Rfl: 0  •  insulin glulisine (Apidra) 100 units/mL injection, Us via sliding scale tid, Disp: 60 mL, Rfl: 0  •  levothyroxine 175 mcg tablet, Take 1 tablet (175 mcg total) by mouth daily in the early morning, Disp: 60 tablet, Rfl: 0  •  omeprazole (PriLOSEC) 40 MG capsule, TAKE 1 CAPSULE DAILY, Disp: 90 capsule, Rfl: 3  •  verapamil (VERELAN PM) 360 MG 24 hr capsule, TAKE 1 CAPSULE DAILY AT BEDTIME, Disp: 90 capsule, Rfl: 3  •  zolpidem (AMBIEN) 10 mg tablet, Take 1 tablet (10 mg total) by mouth daily at bedtime as needed for sleep, Disp: 90 tablet, Rfl: 0  •  acetaminophen (TYLENOL) 325 mg tablet, Take 2 tablets (650 mg total) by mouth every 8 (eight) hours as needed for mild pain, Disp: 60 tablet, Rfl: 0  •  atorvastatin (LIPITOR) 40 mg tablet, Take 1 tablet (40 mg total) by mouth daily with dinner (Patient taking differently: Take 40 mg by mouth every morning), Disp: 90 tablet, Rfl: 3  •  ondansetron (ZOFRAN) 4 mg tablet, Take 1 tablet (4 mg total) by mouth every 6 (six) hours as needed for nausea or vomiting, Disp: 15 tablet, Rfl: 0    REVIEW OF SYSTEMS:  MSK: right knee  Neuro: WNL  Pertinent items are otherwise noted in HPI.   A comprehensive review of systems was otherwise negative.    _____________________________________________________  PHYSICAL EXAMINATION:  Vital signs: /63   Pulse 70   Ht 6' 4" (1.93 m)   Wt 105 kg (231 lb)   BMI 28.12 kg/m²   General: No acute distress, awake and alert  Psychiatric: Mood and affect appear appropriate  HEENT: Trachea Midline, No torticollis, no apparent facial trauma  Cardiovascular: No audible murmurs; Extremities appear perfused  Pulmonary: No audible wheezing or stridor  Skin: No open lesions; see further details (if any) below    MUSCULOSKELETAL EXAMINATION:  Extremities:    Right Knee  Surgical incisions well healing without signs of infection. Scabbing at the proximal lateral incision  There is no erythema. There is no warmth present. There is no effusion. There is no tenderness present. Range of motion from 10 to 90. There is 5/5 quadriceps strength. The patient is able to perform a straight leg raise. Toes are pink and mobile  Compartments are soft  Brisk capillary refill  Sensation intact  The patient is neurovascular intact distally. _____________________________________________________  STUDIES REVIEWED:  I personally reviewed the images and interpretation is as follows:    X-rays taken 10/3/23 of Right knee demonstrate maintenance of alignment of orthopedic hardware without further varus collapse. No acute fracture. PROCEDURES PERFORMED:  Procedures  None performed.        Scribe Attestation    I,:  Rozelle Brittle am acting as a scribe while in the presence of the attending physician.:       I,:  Edgar Saenz MD personally performed the services described in this documentation    as scribed in my presence.:

## 2023-10-09 DIAGNOSIS — I10 HYPERTENSION, ESSENTIAL: ICD-10-CM

## 2023-10-09 RX ORDER — VERAPAMIL HYDROCHLORIDE 360 MG/1
CAPSULE, DELAYED RELEASE PELLETS ORAL
Qty: 90 CAPSULE | Refills: 3 | Status: SHIPPED | OUTPATIENT
Start: 2023-10-09

## 2023-10-19 ENCOUNTER — APPOINTMENT (OUTPATIENT)
Dept: LAB | Facility: CLINIC | Age: 58
End: 2023-10-19
Payer: MEDICARE

## 2023-10-19 DIAGNOSIS — E78.5 HYPERLIPIDEMIA, UNSPECIFIED HYPERLIPIDEMIA TYPE: ICD-10-CM

## 2023-10-19 DIAGNOSIS — E11.51 TYPE 2 DIABETES MELLITUS WITH DIABETIC PERIPHERAL ANGIOPATHY WITHOUT GANGRENE, WITH LONG-TERM CURRENT USE OF INSULIN (HCC): ICD-10-CM

## 2023-10-19 DIAGNOSIS — Z12.5 PROSTATE CANCER SCREENING: ICD-10-CM

## 2023-10-19 DIAGNOSIS — Z79.4 TYPE 2 DIABETES MELLITUS WITH DIABETIC PERIPHERAL ANGIOPATHY WITHOUT GANGRENE, WITH LONG-TERM CURRENT USE OF INSULIN (HCC): ICD-10-CM

## 2023-10-19 LAB
ALBUMIN SERPL BCP-MCNC: 3.9 G/DL (ref 3.5–5)
ALP SERPL-CCNC: 132 U/L (ref 34–104)
ALT SERPL W P-5'-P-CCNC: 34 U/L (ref 7–52)
ANION GAP SERPL CALCULATED.3IONS-SCNC: 11 MMOL/L
AST SERPL W P-5'-P-CCNC: 30 U/L (ref 13–39)
BASOPHILS # BLD AUTO: 0.06 THOUSANDS/ÂΜL (ref 0–0.1)
BASOPHILS NFR BLD AUTO: 1 % (ref 0–1)
BILIRUB SERPL-MCNC: 0.44 MG/DL (ref 0.2–1)
BUN SERPL-MCNC: 20 MG/DL (ref 5–25)
CALCIUM SERPL-MCNC: 9.1 MG/DL (ref 8.4–10.2)
CHLORIDE SERPL-SCNC: 106 MMOL/L (ref 96–108)
CHOLEST SERPL-MCNC: 119 MG/DL
CO2 SERPL-SCNC: 24 MMOL/L (ref 21–32)
CREAT SERPL-MCNC: 0.8 MG/DL (ref 0.6–1.3)
EOSINOPHIL # BLD AUTO: 0.15 THOUSAND/ÂΜL (ref 0–0.61)
EOSINOPHIL NFR BLD AUTO: 2 % (ref 0–6)
ERYTHROCYTE [DISTWIDTH] IN BLOOD BY AUTOMATED COUNT: 13.9 % (ref 11.6–15.1)
GFR SERPL CREATININE-BSD FRML MDRD: 97 ML/MIN/1.73SQ M
GLUCOSE P FAST SERPL-MCNC: 85 MG/DL (ref 65–99)
HCT VFR BLD AUTO: 40.9 % (ref 36.5–49.3)
HDLC SERPL-MCNC: 31 MG/DL
HGB BLD-MCNC: 13.4 G/DL (ref 12–17)
IMM GRANULOCYTES # BLD AUTO: 0.01 THOUSAND/UL (ref 0–0.2)
IMM GRANULOCYTES NFR BLD AUTO: 0 % (ref 0–2)
LDLC SERPL CALC-MCNC: 44 MG/DL (ref 0–100)
LYMPHOCYTES # BLD AUTO: 2.35 THOUSANDS/ÂΜL (ref 0.6–4.47)
LYMPHOCYTES NFR BLD AUTO: 29 % (ref 14–44)
MCH RBC QN AUTO: 30.3 PG (ref 26.8–34.3)
MCHC RBC AUTO-ENTMCNC: 32.8 G/DL (ref 31.4–37.4)
MCV RBC AUTO: 93 FL (ref 82–98)
MONOCYTES # BLD AUTO: 0.81 THOUSAND/ÂΜL (ref 0.17–1.22)
MONOCYTES NFR BLD AUTO: 10 % (ref 4–12)
NEUTROPHILS # BLD AUTO: 4.8 THOUSANDS/ÂΜL (ref 1.85–7.62)
NEUTS SEG NFR BLD AUTO: 58 % (ref 43–75)
NRBC BLD AUTO-RTO: 0 /100 WBCS
PLATELET # BLD AUTO: 261 THOUSANDS/UL (ref 149–390)
PMV BLD AUTO: 11.8 FL (ref 8.9–12.7)
POTASSIUM SERPL-SCNC: 4.2 MMOL/L (ref 3.5–5.3)
PROT SERPL-MCNC: 7.3 G/DL (ref 6.4–8.4)
PSA SERPL-MCNC: 0.27 NG/ML (ref 0–4)
RBC # BLD AUTO: 4.42 MILLION/UL (ref 3.88–5.62)
SODIUM SERPL-SCNC: 141 MMOL/L (ref 135–147)
TRIGL SERPL-MCNC: 218 MG/DL
WBC # BLD AUTO: 8.18 THOUSAND/UL (ref 4.31–10.16)

## 2023-10-19 PROCEDURE — 80061 LIPID PANEL: CPT

## 2023-10-19 PROCEDURE — 85025 COMPLETE CBC W/AUTO DIFF WBC: CPT

## 2023-10-19 PROCEDURE — 80053 COMPREHEN METABOLIC PANEL: CPT

## 2023-10-19 PROCEDURE — 83036 HEMOGLOBIN GLYCOSYLATED A1C: CPT

## 2023-10-19 PROCEDURE — 36415 COLL VENOUS BLD VENIPUNCTURE: CPT

## 2023-10-19 PROCEDURE — G0103 PSA SCREENING: HCPCS

## 2023-10-20 LAB
EST. AVERAGE GLUCOSE BLD GHB EST-MCNC: 131 MG/DL
HBA1C MFR BLD: 6.2 %

## 2023-10-23 DIAGNOSIS — E03.9 ACQUIRED HYPOTHYROIDISM: ICD-10-CM

## 2023-10-23 DIAGNOSIS — F32.A DEPRESSION, UNSPECIFIED DEPRESSION TYPE: ICD-10-CM

## 2023-10-23 RX ORDER — LEVOTHYROXINE SODIUM 175 UG/1
175 TABLET ORAL
Qty: 60 TABLET | Refills: 0 | Status: SHIPPED | OUTPATIENT
Start: 2023-10-23

## 2023-10-23 RX ORDER — ESCITALOPRAM OXALATE 5 MG/1
5 TABLET ORAL DAILY
Qty: 60 TABLET | Refills: 0 | Status: SHIPPED | OUTPATIENT
Start: 2023-10-23

## 2023-11-02 ENCOUNTER — HOSPITAL ENCOUNTER (OUTPATIENT)
Dept: VASCULAR ULTRASOUND | Facility: HOSPITAL | Age: 58
Discharge: HOME | End: 2023-11-02
Attending: SURGERY
Payer: MEDICARE

## 2023-11-02 DIAGNOSIS — I73.9 PAD (PERIPHERAL ARTERY DISEASE) (HCC): ICD-10-CM

## 2023-11-02 PROCEDURE — 93926 LOWER EXTREMITY STUDY: CPT

## 2023-11-06 PROCEDURE — 93926 LOWER EXTREMITY STUDY: CPT | Performed by: SURGERY

## 2023-11-06 PROCEDURE — 93922 UPR/L XTREMITY ART 2 LEVELS: CPT | Performed by: SURGERY

## 2023-11-07 ENCOUNTER — TRANSCRIBE ORDERS (OUTPATIENT)
Dept: ADMINISTRATIVE | Facility: HOSPITAL | Age: 58
End: 2023-11-07

## 2023-11-07 DIAGNOSIS — I73.9 PAD (PERIPHERAL ARTERY DISEASE) (HCC): Primary | ICD-10-CM

## 2023-11-09 ENCOUNTER — TELEPHONE (OUTPATIENT)
Age: 58
End: 2023-11-09

## 2023-11-09 NOTE — TELEPHONE ENCOUNTER
Caller: Patient     Doctor: Tone Romero    Reason for call: Patient is calling stating the company he rents his wheelchair from is going to be contacting our office because they need a re certification for the use of his wheelchair. He stated the companies name is [facility]. They advised they wanted him to schedule an appt with Dr Tone Romero but he states that he iss doing well and was advised to come back in January. He stated if for some reason he has to come back in order to get his wheelchair approved please let him knw.     Call back#: [phone]

## 2023-11-11 DIAGNOSIS — G47.00 INSOMNIA, UNSPECIFIED TYPE: ICD-10-CM

## 2023-11-13 RX ORDER — ZOLPIDEM TARTRATE 10 MG/1
10 TABLET ORAL
Qty: 90 TABLET | Refills: 0 | Status: SHIPPED | OUTPATIENT
Start: 2023-11-13 | End: 2024-05-11

## 2023-11-13 NOTE — TELEPHONE ENCOUNTER
Medication:  PDMP     [id number] 07/09/2023 05/01/2023 Zolpidem Tartrate (Tablet) 90.0 90 10 MG NA [facility] [employer] 0 / 0 [place]     1 [id number] 04/27/2023 04/25/2023 Zolpidem Tartrate (Tablet) 90.0 90 10 MG NA [facility] [employer] 0 / 1 [place]    1 [id number] 03/22/2023 03/20/2023 Zolpidem Tartrate (Tablet) 10.0 10 10 MG NA [facility] [employer] 0 / 0 [place]    2 [id number] 02/21/2023 02/21/2023 traMADol HCL (Tablet) 45.0 11 50 MG 20.45 JESUSITA MARQUES [facility] [employer] 0 / 0 [place]        Active agreement on file -No

## 2023-11-14 ENCOUNTER — TELEPHONE (OUTPATIENT)
Age: 58
End: 2023-11-14

## 2023-11-14 NOTE — TELEPHONE ENCOUNTER
Caller: Patient     Doctor: Sulaiman    Reason for call: Patient requesting a different leg brace wanting something more short   Please advise     Call back#: [phone]

## 2023-11-16 ENCOUNTER — TELEPHONE (OUTPATIENT)
Age: 58
End: 2023-11-16

## 2023-11-16 NOTE — TELEPHONE ENCOUNTER
Caller: [facility]    Doctor/OfficeRossy Reece    CB#: [phone] ext [phone]      What needs to be faxed: Last office visit     ATTN to: Teressa Aguilar    Fax#: [phone]      Documents were successfully e-faxed

## 2023-11-20 ENCOUNTER — OFFICE VISIT (OUTPATIENT)
Dept: GASTROENTEROLOGY | Facility: CLINIC | Age: 58
End: 2023-11-20
Payer: MEDICARE

## 2023-11-20 VITALS
HEART RATE: 72 BPM | HEIGHT: 76 IN | DIASTOLIC BLOOD PRESSURE: 90 MMHG | BODY MASS INDEX: 31.66 KG/M2 | WEIGHT: 260 LBS | SYSTOLIC BLOOD PRESSURE: 140 MMHG

## 2023-11-20 DIAGNOSIS — K59.00 CONSTIPATION, UNSPECIFIED CONSTIPATION TYPE: Primary | ICD-10-CM

## 2023-11-20 DIAGNOSIS — Z86.010 HISTORY OF COLON POLYPS: ICD-10-CM

## 2023-11-20 PROCEDURE — 99213 OFFICE O/P EST LOW 20 MIN: CPT | Performed by: INTERNAL MEDICINE

## 2023-11-20 NOTE — PROGRESS NOTES
[facility] - Outpatient Note  Lizzy Montes De Oca 59 y.o. male MRN: [id number]  Encounter: [id number]      ASSESSMENT AND PLAN:    Lizzy Montes De Oca is a 59 y.o. old pleasant male with PMH of previously uncontrolled diabetes, history of thyroid cancer status post thyroidectomy now currently hypothyroid, peripheral artery disease, history of colon polyps who presents for followup    Constipation-patient is constipation has resolved itself since he initially scheduled the appointment. He states him ambulating and getting out of the wheelchair has resolved his constipation. He is now having a full bowel movement every day. Previously failed MiraLAX, Dulcolax and Colace. Continue increasing ambulation. Suspect his constipation will continue to improve with further mobility and physical therapy. Fleet enemas have been working well for patient as needed  Counseled increasing fluid  If he calls back with worsening constipation will plan for Linzess    History of colon polyps-2 subcentimeter colon polyps removed during colonoscopy earlier in 2022  Repeat colonoscopy in 2025        There are no diagnoses linked to this encounter.  ______________________________________________________________________    SUBJECTIVE: Patient states initially the visible constipation however since he scheduled the visit his constipation is now resolved after ambulating more out of the wheelchair. Pain nausea and constipation diarrhea. He states he previously tried MiraLAX Dulcolax colitis which have failed. He has been using Fleet enemas as needed and they worked well. He had 6 bowel movements this past week and feels much better. He was worried about coming laxative dependent. He had previous colonoscopy as above.     Answers submitted by the patient for this visit:  Abdominal Pain Questionnaire (Submitted on 11/20/2023)  Chief Complaint: Abdominal pain  Chronicity: new  Onset: 1 to 4 weeks ago  Onset quality: undetermined  Frequency: intermittently  Progression since onset: rapidly improving  Pain location: suprapubic region  Pain - numeric: 2/10  Pain quality: dull  Radiates to: does not radiate  anorexia: No  arthralgias: No  belching: No  constipation: Yes  diarrhea: No  dysuria: No  fever: No  flatus: Yes  frequency: Yes  headaches: No  hematochezia: No  hematuria: No  melena: No  myalgias: No  nausea: No  weight loss: No  vomiting: No  Aggravated by: nothing  Relieved by: recumbency  Diagnostic workup: lower endoscopy    I reviewed prior external notes    I reviewed previous lab results and images      REVIEW OF SYSTEMS:     REVIEW OF ALL OTHER SYSTEMS IS OTHERWISE NEGATIVE.       Historical Information   Past Medical History:   Diagnosis Date    Broken internal right knee prosthesis ([address]) 01/2023    Chronic kidney disease     Colon polyp     Constipation 03/09/2023    Diabetes mellitus ([address])     Disease of thyroid gland     GERD (gastroesophageal reflux disease)     HHS vs DKA 11/16/2018    Hyperlipidemia     Hypertension     Thyroid cancer [facility])      Past Surgical History:   Procedure Laterality Date    COLONOSCOPY      FOOT AMPUTATION Right     FOOT SURGERY Right 2014    IR AORTAGRAM WITH RUN-OFF  08/06/2020    IR TUNNELED CENTRAL LINE PLACEMENT  09/08/2020    IR TUNNELED CENTRAL LINE REMOVAL  09/28/2020    MI AMPUTATION FOOT TRANSMETARSAL Right 09/03/2020    Procedure: AMPUTATION TRANSMETATARSAL (TMA);  Surgeon: Bautista Robles DPM;  Location: [facility];  Service: Podiatry    MI OPEN [place] UNICONDYLAR Right 5/1/2023    Procedure: OPEN REDUCTION W/ INTERNAL FIXATION (ORIF) RIGHT TIBIAL PLATEAU NONUNION;  Surgeon: Edgar Saenz MD;  Location: [facility];  Service: Orthopedics    MI THYROIDECTOMY TOTAL/COMPLETE N/A 02/03/2021    Procedure: TOTAL THYROIDECTOMY WITH NIMS MONITORING;  Surgeon: Ellis Hickey MD;  Location: [facility];  Service: ENT    TOE AMPUTATION Right 08/11/2020 Procedure: AMPUTATION TOE;  Surgeon: Dian Kauffman DPM;  Location: [facility] OR;  Service: [facility] THYROID BIOPSY  07/02/2020     Social History   Social History     Substance and Sexual Activity   Alcohol Use Not Currently    Comment: quit 1-2023     Social History     Substance and Sexual Activity   Drug Use Not Currently    Types: Marijuana     Social History     Tobacco Use   Smoking Status Never   Smokeless Tobacco Never     Family History   Problem Relation Age of Onset    Cancer Mother     Hypertension Father        Meds/Allergies       Current Outpatient Medications:     atorvastatin (LIPITOR) 40 mg tablet    BD PEN NEEDLE DEV U/F 32G X 4 MM MISC    Blood Glucose Monitoring Suppl (ONE TOUCH ULTRA 2) w/Device KIT    escitalopram (LEXAPRO) 5 mg tablet    Insulin Glargine Solostar (Lantus SoloStar) 100 UNIT/ML SOPN    insulin glulisine (Apidra) 100 units/mL injection    levothyroxine 175 mcg tablet    omeprazole (PriLOSEC) 40 MG capsule    verapamil (VERELAN PM) 360 MG 24 hr capsule    zolpidem (AMBIEN) 10 mg tablet    acetaminophen (TYLENOL) 325 mg tablet    clopidogrel (PLAVIX) 75 mg tablet    ferrous sulfate 325 (65 Fe) mg tablet    ondansetron (ZOFRAN) 4 mg tablet    Allergies   Allergen Reactions    Vancomycin Other (See Comments)     Kidney issues    Pollen Extract Eye Swelling     Eyes get watery            Objective     Blood pressure 140/90, pulse 72, height 6' 4" (1.93 m), weight 118 kg (260 lb). Body mass index is 31.65 kg/m². PHYSICAL EXAM:      General Appearance:   Alert, cooperative, no distress   HEENT:   Normocephalic, atraumatic, anicteric. Neck:  Supple, symmetrical, trachea midline   Lungs:   Clear to auscultation bilaterally; no rales, rhonchi or wheezing; respirations unlabored    Heart[de-identified]   Regular rate and rhythm; no murmur, rub, or gallop.    Abdomen:   Soft, non-tender, non-distended; normal bowel sounds; no masses, no organomegaly    Genitalia:   Deferred Rectal:   Deferred    Extremities:  No cyanosis, clubbing or edema    Pulses:  2+ and symmetric    Skin:  No jaundice, rashes, or lesions    Lymph nodes:  No palpable cervical lymphadenopathy        Lab Results:   No visits with results within 1 Day(s) from this visit.    Latest known visit with results is:   Appointment on 10/19/2023   Component Date Value    Hemoglobin A1C 10/19/2023 6.2 (H)     EAG 10/19/2023 131     Sodium 10/19/2023 141     Potassium 10/19/2023 4.2     Chloride 10/19/2023 106     CO2 10/19/2023 24     ANION GAP 10/19/2023 11     BUN 10/19/2023 20     Creatinine 10/19/2023 0.80     Glucose, Fasting 10/19/2023 85     Calcium 10/19/2023 9.1     AST 10/19/2023 30     ALT 10/19/2023 34     Alkaline Phosphatase 10/19/2023 132 (H)     Total Protein 10/19/2023 7.3     Albumin 10/19/2023 3.9     Total Bilirubin 10/19/2023 0.44     eGFR 10/19/2023 97     WBC 10/19/2023 8.18     RBC 10/19/2023 4.42     Hemoglobin 10/19/2023 13.4     Hematocrit 10/19/2023 40.9     MCV 10/19/2023 93     MCH 10/19/2023 30.3     MCHC 10/19/2023 32.8     RDW 10/19/2023 13.9     MPV 10/19/2023 11.8     Platelets 73/34/3677 261     nRBC 10/19/2023 0     Neutrophils Relative 10/19/2023 58     Immat GRANS % 10/19/2023 0     Lymphocytes Relative 10/19/2023 29     Monocytes Relative 10/19/2023 10     Eosinophils Relative 10/19/2023 2     Basophils Relative 10/19/2023 1     Neutrophils Absolute 10/19/2023 4.80     Immature Grans Absolute 10/19/2023 0.01     Lymphocytes Absolute 10/19/2023 2.35     Monocytes Absolute 10/19/2023 0.81     Eosinophils Absolute 10/19/2023 0.15     Basophils Absolute 10/19/2023 0.06     Cholesterol 10/19/2023 119     Triglycerides 10/19/2023 218 (H)     HDL, Direct 10/19/2023 31 (L)     LDL Calculated 10/19/2023 44     PSA 10/19/2023 0.27          Radiology Results:   VAS lower limb arterial duplex, limited/unilateral    Result Date: 11/6/2023  Narrative:  [facility] REPORT CLINICAL: Indications: PVD, Unspecified [I73.9]. 6 month surveillance for progression of disease. Patient is asymptomatic at this time. Hx of leg trauma. Operative History: 2020-08-11 Right Great toe amputation 2020-08-06 Right Popliteal artery stent Risk Factors The patient has history of HTN and Diabetes (Yes). He has no history of Hyperlipidemia. Clinical Right Pressure:  154/ mm Hg, Left Pressure:  145/ mm Hg. FINDINGS:  Right                  PSV (cm/s)  Common Femoral Artery         102  Prox Profunda                  99  Prox SFA                      139  Mid SFA                        90  Dist SFA                      141  Proximal Pop - Stent           71  Distal Pop - Stent            141  Tibioperoneal                  44  Prox Post Tibial               21  Dist Post Tibial               32  Prox. Ant. Tibial             131  Dist. Ant. Tibial              47     CONCLUSION:  Impression: RIGHT LOWER LIMB: This resting evaluation shows patent popliteal artery and stent. Ankle/Brachial index was unable to be obtained due to non-compressible vessels (Prior: non compressible). PVR/ PPG tracings are normal. Metatarsal and Great toe pressure was not obtained due to transmetatarsal amputation. There are an echogenic structures located in the inguinal region and are consistent with enlarged lymph node and channels. LEFT LOWER LIMB: Limited Ankle/Brachial index was unable to be obtained due to non-compressible vessels (Prior: non compressible). PVR/ PPG tracings are normal. Metatarsal pressure was unable to be obtained due to non-compressible vessels. Great toe pressure of 146 mmHg, within the healing range. Prior 136 mmHg  Compared to previous study on 4/25/2023, there is no significant change in the disease process.   SIGNATURE: Electronically Signed by: Yan Cheung MD, RPVI on 2023-11-06 03:01:42 PM

## 2023-11-27 ENCOUNTER — TELEPHONE (OUTPATIENT)
Age: 58
End: 2023-11-27

## 2023-11-27 NOTE — TELEPHONE ENCOUNTER
Caller: Dallin Brito     Doctor: Antonieta Mosquera     Reason for call: Patient calling regarding his knee brace , He is looking to have a switch so a shorter one . Patients states he is having issues putting on pants with it .   Please advise     Call back#: [phone]

## 2023-11-30 DIAGNOSIS — S82.131P: ICD-10-CM

## 2023-11-30 DIAGNOSIS — Z98.890 S/P ORIF (OPEN REDUCTION INTERNAL FIXATION) FRACTURE: Primary | ICD-10-CM

## 2023-11-30 DIAGNOSIS — Z87.81 S/P ORIF (OPEN REDUCTION INTERNAL FIXATION) FRACTURE: Primary | ICD-10-CM

## 2023-12-04 DIAGNOSIS — E03.9 ACQUIRED HYPOTHYROIDISM: ICD-10-CM

## 2023-12-04 RX ORDER — LEVOTHYROXINE SODIUM 175 UG/1
175 TABLET ORAL
Qty: 60 TABLET | Refills: 5 | Status: SHIPPED | OUTPATIENT
Start: 2023-12-04

## 2024-01-01 DIAGNOSIS — K21.9 GASTROESOPHAGEAL REFLUX DISEASE WITHOUT ESOPHAGITIS: ICD-10-CM

## 2024-01-01 RX ORDER — OMEPRAZOLE 40 MG/1
CAPSULE, DELAYED RELEASE ORAL
Qty: 90 CAPSULE | Refills: 3 | Status: SHIPPED | OUTPATIENT
Start: 2024-01-01

## 2024-01-06 DIAGNOSIS — Z98.890 S/P ORIF (OPEN REDUCTION INTERNAL FIXATION) FRACTURE: Primary | ICD-10-CM

## 2024-01-06 DIAGNOSIS — Z87.81 S/P ORIF (OPEN REDUCTION INTERNAL FIXATION) FRACTURE: Primary | ICD-10-CM

## 2024-01-11 ENCOUNTER — APPOINTMENT (OUTPATIENT)
Dept: RADIOLOGY | Facility: CLINIC | Age: 59
End: 2024-01-11
Payer: MEDICARE

## 2024-01-11 ENCOUNTER — OFFICE VISIT (OUTPATIENT)
Dept: OBGYN CLINIC | Facility: CLINIC | Age: 59
End: 2024-01-11
Payer: MEDICARE

## 2024-01-11 VITALS
BODY MASS INDEX: 29.69 KG/M2 | DIASTOLIC BLOOD PRESSURE: 68 MMHG | HEART RATE: 71 BPM | SYSTOLIC BLOOD PRESSURE: 104 MMHG | HEIGHT: 76 IN | WEIGHT: 243.8 LBS

## 2024-01-11 DIAGNOSIS — Z87.81 S/P ORIF (OPEN REDUCTION INTERNAL FIXATION) FRACTURE: ICD-10-CM

## 2024-01-11 DIAGNOSIS — Z98.890 S/P ORIF (OPEN REDUCTION INTERNAL FIXATION) FRACTURE: Primary | ICD-10-CM

## 2024-01-11 DIAGNOSIS — Z98.890 S/P ORIF (OPEN REDUCTION INTERNAL FIXATION) FRACTURE: ICD-10-CM

## 2024-01-11 DIAGNOSIS — Z87.81 S/P ORIF (OPEN REDUCTION INTERNAL FIXATION) FRACTURE: Primary | ICD-10-CM

## 2024-01-11 DIAGNOSIS — M17.31 POST-TRAUMATIC OSTEOARTHRITIS OF RIGHT KNEE: ICD-10-CM

## 2024-01-11 DIAGNOSIS — T84.418A BREAKDOWN (MECHANICAL) OF OTHER INTERNAL ORTHOPEDIC DEVICES, IMPLANTS AND GRAFTS, INITIAL ENCOUNTER (HCC): ICD-10-CM

## 2024-01-11 PROCEDURE — 73560 X-RAY EXAM OF KNEE 1 OR 2: CPT

## 2024-01-11 PROCEDURE — 99214 OFFICE O/P EST MOD 30 MIN: CPT | Performed by: ORTHOPAEDIC SURGERY

## 2024-01-11 RX ORDER — TRAMADOL HYDROCHLORIDE 50 MG/1
50 TABLET ORAL EVERY 6 HOURS PRN
Qty: 40 TABLET | Refills: 0 | Status: SHIPPED | OUTPATIENT
Start: 2024-01-11

## 2024-01-11 RX ORDER — CHLORHEXIDINE GLUCONATE ORAL RINSE 1.2 MG/ML
15 SOLUTION DENTAL ONCE
OUTPATIENT
Start: 2024-01-11 | End: 2024-01-11

## 2024-01-11 NOTE — H&P (VIEW-ONLY)
Orthopaedics Office Visit - Post-op Patient Visit    ASSESSMENT/PLAN:    Assessment:   -8 months s/p Right tibial plateau ORIF malunion/nonunion, DOS 5/1/23   -With subsequent known collapse of medial tibial plateau  -Collapse of lateral femoral condyle  -Lateral screw backing out, palpable under skin but no threatened skin    Able to ambulate in TROM, slowly gaining quad strength    Plan:   The patient's x-rays were reviewed today.   Treatment options were discussed with the patient moving forward including continued physical therapy, removal of hardware, total knee arthroplasty, vitamin D and calcium supplementation  Risks of surgery, including but not limited to, anesthesia complications, infection, damage to nerves and blood vessels, blood clots, postoperative stiffness, residual pain, residual weakness, need for subsequent surgery, worsening deformity and even death were discussed at length.  The patient understands the risks and benefits of all mentioned treatment options and has no further questions.   The patient was informed if he wants to move forward with total knee arthroplasty removal of hardware will need to be performed first. Also, his incisions from removal of hardware will need to be healed prior to total knee arthroplasty.   The patient will require pre-operative clearance and evaluation in the form of primary care physician, and obtain blood work.   The patient has elected to move forward with removal of hardware.  Consent for surgery was obtained today.   The patient was provided a prescription of Tramadol to help manage his symptoms.   I will see the patient on the day of surgery.  I will see the patient back 2 weeks post-operatively     Patient remains at high risk for complications secondary to known diabetes and peripheral artery disease    To Do Next Visit:  Right knee x-rays, suture removal     _____________________________________________________  CHIEF COMPLAINT:  Chief Complaint    Patient presents with    Right Knee - Follow-up         SUBJECTIVE:  Aaron Richards is a 58 y.o. male who presents 8 months status post Right tibial plateau ORIF malunion/nonunion performed on 5/1/23. The patient was last seen in the office on 10/3/23 where he was advised to continue with home physical therapy, progress weightbearing status as tolerated. Pain is rated 1-8/10 depending on the weather and his activity level. He requests something for pain on his severe days as Advil and Tylenol are not effective anymore. When he does therapy his leg pain increases. Home physical therapy would like to end there services and would like the patient to transition to outpatient physical therapy. He is using a walker at home. He cannot walk long distances with the walker and uses his wheelchair when he is required to go long distances. He continues to use his TROM for ambulation. He transitions to a smaller brace when he has to use the bathroom.  He reports physical therapy has been beneficial. He has been working on balance and has progressed well. He notes pain with weightbearing.        SOCIAL HISTORY:  Social History     Tobacco Use    Smoking status: Never    Smokeless tobacco: Never   Vaping Use    Vaping status: Never Used   Substance Use Topics    Alcohol use: Not Currently     Comment: quit 1-2023    Drug use: Not Currently     Types: Marijuana       MEDICATIONS:    Current Outpatient Medications:     atorvastatin (LIPITOR) 40 mg tablet, TAKE 1 TABLET DAILY WITH DINNER, Disp: 90 tablet, Rfl: 3    BD PEN NEEDLE DEV U/F 32G X 4 MM MISC, Inject under the skin 4 (four) times a day, Disp: 400 each, Rfl: 1    Blood Glucose Monitoring Suppl (ONE TOUCH ULTRA 2) w/Device KIT, Use 2 (two) times a day, Disp: 1 kit, Rfl: 0    escitalopram (LEXAPRO) 5 mg tablet, Take 1 tablet (5 mg total) by mouth daily, Disp: 60 tablet, Rfl: 0    Insulin Glargine Solostar (Lantus SoloStar) 100 UNIT/ML SOPN, 30 units bid, Disp: 225 mL,  "Rfl: 0    insulin glulisine (Apidra) 100 units/mL injection, Us via sliding scale tid, Disp: 60 mL, Rfl: 0    levothyroxine 175 mcg tablet, TAKE 1 TABLET DAILY EARLY IN THE MORNING, Disp: 60 tablet, Rfl: 5    omeprazole (PriLOSEC) 40 MG capsule, TAKE 1 CAPSULE DAILY, Disp: 90 capsule, Rfl: 3    verapamil (VERELAN PM) 360 MG 24 hr capsule, TAKE 1 CAPSULE DAILY AT BEDTIME, Disp: 90 capsule, Rfl: 3    zolpidem (AMBIEN) 10 mg tablet, Take 1 tablet (10 mg total) by mouth daily at bedtime as needed for sleep, Disp: 90 tablet, Rfl: 0    acetaminophen (TYLENOL) 325 mg tablet, Take 2 tablets (650 mg total) by mouth every 8 (eight) hours as needed for mild pain, Disp: 60 tablet, Rfl: 0    clopidogrel (PLAVIX) 75 mg tablet, TAKE 1 TABLET DAILY, Disp: 90 tablet, Rfl: 3    ferrous sulfate 325 (65 Fe) mg tablet, Take 1 tablet (325 mg total) by mouth daily at bedtime, Disp: 30 tablet, Rfl: 0    ondansetron (ZOFRAN) 4 mg tablet, Take 1 tablet (4 mg total) by mouth every 6 (six) hours as needed for nausea or vomiting, Disp: 15 tablet, Rfl: 0    REVIEW OF SYSTEMS:  MSK: right knee  Neuro: WNL  Pertinent items are otherwise noted in HPI.  A comprehensive review of systems was otherwise negative.    _____________________________________________________  PHYSICAL EXAMINATION:  Vital signs: /68   Pulse 71   Ht 6' 4\" (1.93 m)   Wt 111 kg (243 lb 12.8 oz)   BMI 29.68 kg/m²   General: No acute distress, awake and alert  Psychiatric: Mood and affect appear appropriate  HEENT: Trachea Midline, No torticollis, no apparent facial trauma  Cardiovascular: No audible murmurs; Extremities appear perfused  Pulmonary: No audible wheezing or stridor  Skin: No open lesions; see further details (if any) below    MUSCULOSKELETAL EXAMINATION:  Extremities:    Right Knee   Alignment varus   Lateral swelling of the knee   Prominent screw at lateral tibial plateau  Surgical incisions well healing without signs of infection.  There is no " erythema.  There is no warmth present.  There is trace effusion.    There is no tenderness present.    Range of motion from 0 to 130.  There is 5/5 quadriceps strength.    The patient is able to perform a straight leg raise with extensor lag.    Toes are pink and mobile  Compartments are soft  Brisk capillary refill  Sensation intact  The patient is neurovascular intact distally.    _____________________________________________________  STUDIES REVIEWED:  I personally reviewed the images and interpretation is as follows:    X-rays taken 1/11/24 of Right knee standing demonstrate lateral tibial plateau screw loosening. Lateral subluxation of tibia on femur. Lateral femoral condyle collapse. Medial tibial plateau collapse. Varus alignment. No acute fracture.    PROCEDURES PERFORMED:  Procedures  None performed.       Scribe Attestation      I,:  Tanya Salazar am acting as a scribe while in the presence of the attending physician.:       I,:  Sesar Hilario MD personally performed the services described in this documentation    as scribed in my presence.:

## 2024-01-15 ENCOUNTER — APPOINTMENT (OUTPATIENT)
Dept: LAB | Facility: CLINIC | Age: 59
End: 2024-01-15
Payer: MEDICARE

## 2024-01-15 ENCOUNTER — TELEPHONE (OUTPATIENT)
Dept: OBGYN CLINIC | Facility: CLINIC | Age: 59
End: 2024-01-15

## 2024-01-15 ENCOUNTER — ANESTHESIA EVENT (OUTPATIENT)
Dept: PERIOP | Facility: HOSPITAL | Age: 59
End: 2024-01-15
Payer: MEDICARE

## 2024-01-15 DIAGNOSIS — Z87.81 S/P ORIF (OPEN REDUCTION INTERNAL FIXATION) FRACTURE: ICD-10-CM

## 2024-01-15 DIAGNOSIS — Z98.890 S/P ORIF (OPEN REDUCTION INTERNAL FIXATION) FRACTURE: ICD-10-CM

## 2024-01-15 LAB
ANION GAP SERPL CALCULATED.3IONS-SCNC: 9 MMOL/L
BASOPHILS # BLD AUTO: 0.05 THOUSANDS/ÂΜL (ref 0–0.1)
BASOPHILS NFR BLD AUTO: 1 % (ref 0–1)
BUN SERPL-MCNC: 20 MG/DL (ref 5–25)
CALCIUM SERPL-MCNC: 9.3 MG/DL (ref 8.4–10.2)
CHLORIDE SERPL-SCNC: 105 MMOL/L (ref 96–108)
CO2 SERPL-SCNC: 24 MMOL/L (ref 21–32)
CREAT SERPL-MCNC: 0.9 MG/DL (ref 0.6–1.3)
EOSINOPHIL # BLD AUTO: 0.15 THOUSAND/ÂΜL (ref 0–0.61)
EOSINOPHIL NFR BLD AUTO: 2 % (ref 0–6)
ERYTHROCYTE [DISTWIDTH] IN BLOOD BY AUTOMATED COUNT: 13 % (ref 11.6–15.1)
GFR SERPL CREATININE-BSD FRML MDRD: 92 ML/MIN/1.73SQ M
GLUCOSE P FAST SERPL-MCNC: 118 MG/DL (ref 65–99)
HCT VFR BLD AUTO: 43.8 % (ref 36.5–49.3)
HGB BLD-MCNC: 14.4 G/DL (ref 12–17)
IMM GRANULOCYTES # BLD AUTO: 0.02 THOUSAND/UL (ref 0–0.2)
IMM GRANULOCYTES NFR BLD AUTO: 0 % (ref 0–2)
LYMPHOCYTES # BLD AUTO: 2.06 THOUSANDS/ÂΜL (ref 0.6–4.47)
LYMPHOCYTES NFR BLD AUTO: 27 % (ref 14–44)
MCH RBC QN AUTO: 30.6 PG (ref 26.8–34.3)
MCHC RBC AUTO-ENTMCNC: 32.9 G/DL (ref 31.4–37.4)
MCV RBC AUTO: 93 FL (ref 82–98)
MONOCYTES # BLD AUTO: 0.8 THOUSAND/ÂΜL (ref 0.17–1.22)
MONOCYTES NFR BLD AUTO: 11 % (ref 4–12)
NEUTROPHILS # BLD AUTO: 4.47 THOUSANDS/ÂΜL (ref 1.85–7.62)
NEUTS SEG NFR BLD AUTO: 59 % (ref 43–75)
NRBC BLD AUTO-RTO: 0 /100 WBCS
PLATELET # BLD AUTO: 256 THOUSANDS/UL (ref 149–390)
PMV BLD AUTO: 12 FL (ref 8.9–12.7)
POTASSIUM SERPL-SCNC: 4.4 MMOL/L (ref 3.5–5.3)
RBC # BLD AUTO: 4.7 MILLION/UL (ref 3.88–5.62)
SODIUM SERPL-SCNC: 138 MMOL/L (ref 135–147)
WBC # BLD AUTO: 7.55 THOUSAND/UL (ref 4.31–10.16)

## 2024-01-15 PROCEDURE — 36415 COLL VENOUS BLD VENIPUNCTURE: CPT

## 2024-01-15 PROCEDURE — 80048 BASIC METABOLIC PNL TOTAL CA: CPT

## 2024-01-15 PROCEDURE — 85025 COMPLETE CBC W/AUTO DIFF WBC: CPT

## 2024-01-15 RX ORDER — MULTIVIT WITH MINERALS/LUTEIN
250 TABLET ORAL DAILY
COMMUNITY

## 2024-01-15 RX ORDER — FAMOTIDINE 20 MG
TABLET ORAL
COMMUNITY

## 2024-01-15 NOTE — PRE-PROCEDURE INSTRUCTIONS
Pre-Surgery Instructions:   Medication Instructions    ascorbic acid (VITAMIN C) 250 mg tablet Stop taking 7 days prior to surgery.    atorvastatin (LIPITOR) 40 mg tablet Take night before surgery    escitalopram (LEXAPRO) 5 mg tablet Take night before surgery    Insulin Glargine Solostar (Lantus SoloStar) 100 UNIT/ML SOPN Take day of surgery.    insulin glulisine (Apidra) 100 units/mL injection Hold day of surgery.    levothyroxine 175 mcg tablet Take day of surgery.    omeprazole (PriLOSEC) 40 MG capsule Take night before surgery    traMADol (Ultram) 50 mg tablet Uses PRN- OK to take day of surgery    verapamil (VERELAN PM) 360 MG 24 hr capsule Take night before surgery    Vitamin D, Cholecalciferol, 25 MCG (1000 UT) CAPS Stop taking 7 days prior to surgery.    zolpidem (AMBIEN) 10 mg tablet Take night before surgery     Spoke with pt via phone.    Medication instructions for day surgery reviewed. Please use only a sip of water to take your instructed medications. Avoid all over the counter vitamins, supplements and NSAIDS for one week prior to surgery per anesthesia guidelines. Tylenol is ok to take as needed.     You will receive a call one business day prior to surgery with an arrival time and hospital directions. If your surgery is scheduled on a Monday, the hospital will be calling you on the Friday prior to your surgery. If you have not heard from anyone by 8pm, please call the hospital supervisor through the hospital  at [phone]. (Paulie [phone]).    Do not eat or drink anything after midnight the night before your surgery, including candy, mints, lifesavers, or chewing gum. Do not drink alcohol 24hrs before your surgery. Try not to smoke at least 24hrs before your surgery.       Follow the pre surgery showering instructions as listed in the “My Surgical Experience Booklet” or otherwise provided by your surgeon's office. Do not use a blade to shave the surgical area 1 week before  surgery. It is okay to use a clean electric clippers up to 24 hours before surgery. Do not apply any lotions, creams, including makeup, cologne, deodorant, or perfumes after showering on the day of your surgery. Do not use dry shampoo, hair spray, hair gel, or any type of hair products.     No contact lenses, eye make-up, or artificial eyelashes. Remove nail polish, including gel polish, and any artificial, gel, or acrylic nails if possible. Remove all jewelry including rings and body piercing jewelry.     Wear causal clothing that is easy to take on and off. Consider your type of surgery.    Keep any valuables, jewelry, piercings at home. Please bring any specially ordered equipment (sling, braces) if indicated.    Arrange for a responsible person to drive you to and from the hospital on the day of your surgery. Visitor Guidelines discussed.     Call the surgeon's office with any new illnesses, exposures, or additional questions prior to surgery.    Please reference your “My Surgical Experience Booklet” for additional information to prepare for your upcoming surgery.

## 2024-01-15 NOTE — TELEPHONE ENCOUNTER
Called patient to remind him that lab work will need to be completed today since  appt is 1.16.24. He will be going this morning to complete.

## 2024-01-18 ENCOUNTER — CONSULT (OUTPATIENT)
Dept: FAMILY MEDICINE CLINIC | Facility: CLINIC | Age: 59
End: 2024-01-18
Payer: MEDICARE

## 2024-01-18 VITALS
DIASTOLIC BLOOD PRESSURE: 88 MMHG | WEIGHT: 243 LBS | BODY MASS INDEX: 29.59 KG/M2 | HEART RATE: 78 BPM | OXYGEN SATURATION: 98 % | HEIGHT: 76 IN | SYSTOLIC BLOOD PRESSURE: 130 MMHG | TEMPERATURE: 98.9 F

## 2024-01-18 DIAGNOSIS — Z87.81 S/P ORIF (OPEN REDUCTION INTERNAL FIXATION) FRACTURE: Primary | ICD-10-CM

## 2024-01-18 DIAGNOSIS — Z01.818 PRE-OP EXAMINATION: ICD-10-CM

## 2024-01-18 DIAGNOSIS — Z98.890 S/P ORIF (OPEN REDUCTION INTERNAL FIXATION) FRACTURE: Primary | ICD-10-CM

## 2024-01-18 PROCEDURE — 99214 OFFICE O/P EST MOD 30 MIN: CPT | Performed by: PHYSICIAN ASSISTANT

## 2024-01-18 NOTE — PROGRESS NOTES
Subjective:     Aaron Richards is a 58 y.o. male who presents to the office today for a preoperative consultation at the request of surgeon Sesar Hilario MD who plans on performing removal of hardware right tibia on January 22. This consultation is requested for the specific conditions prompting preoperative evaluation (i.e. because of potential affect on operative risk): general health. Planned anesthesia: general. The patient has the following known anesthesia issues:  none . Patients bleeding risk: no recent abnormal bleeding. Patient does not have objections to receiving blood products if needed.    The following portions of the patient's history were reviewed and updated as appropriate: He  has a past medical history of Broken internal right knee prosthesis ([facility]) (01/2023), Chronic kidney disease, Colon polyp, Constipation (03/09/2023), Diabetes mellitus ([facility]), Disease of thyroid gland, GERD (gastroesophageal reflux disease), HHS vs DKA (11/16/2018), Hyperlipidemia, Hypertension, and Thyroid cancer ([facility]).  He   Patient Active Problem List    Diagnosis Date Noted    Pre-op examination 01/18/2024    Closed fracture of medial portion of right tibial plateau with malunion, subsequent encounter 11/30/2023    S/P ORIF (open reduction internal fixation) fracture 06/20/2023    MANNY (obstructive sleep apnea) 05/01/2023    Closed fracture of medial plateau of right tibia 04/13/2023    Constipation 03/09/2023    Depression and anxiety 03/03/2023    Acquired genu varum of right lower extremity 02/21/2023    MARKELL (acute kidney injury) ([facility]) 02/14/2023    Chronic kidney disease-mineral and bone disorder 02/14/2023    Closed fracture of right tibial plateau 02/07/2023    Stage 3b chronic kidney disease ([facility]) 11/15/2022    Hyperkalemia 11/15/2022    Alcoholism ([facility]) 11/15/2022    Dupuytren's contracture of right hand 06/07/2022    Type 2 diabetes mellitus with diabetic peripheral angiopathy without gangrene ([facility]) 02/07/2022     Acquired hypothyroidism 10/18/2021    Acquired absence of right foot ([facility]) 03/29/2021    Persistent proteinuria 01/12/2021    Abnormal EKG 12/03/2020    Insomnia 12/03/2020    Urinary retention 09/08/2020    Thyroid cancer ([facility]) 08/20/2020    PAD (peripheral artery disease) ([facility]CC) 08/05/2020    Elevated liver enzymes 10/24/2019    Colon polyps 09/12/2019    Right-sided tinnitus 11/26/2018    HHS vs DKA 11/16/2018    Hypertension, essential 09/24/2018    Gastroesophageal reflux disease without esophagitis 09/24/2018    Hyperlipidemia 12/17/2009    History of nonadherence to medical treatment 02/20/2008     He  has a past surgical history that includes Foot surgery (Right, 2014); US guided thyroid biopsy (07/02/2020); IR aortagram with run-off (08/06/2020); Toe amputation (Right, 08/11/2020); pr amputation foot transmetarsal (Right, 09/03/2020); IR tunneled central line placement (09/08/2020); IR tunneled central line removal (09/28/2020); pr thyroidectomy total/complete (N/A, 02/03/2021); Colonoscopy; Foot Amputation (Right); and pr open tx tibial fracture proximal unicondylar (Right, 5/1/2023).  His family history includes Cancer in his mother; Hypertension in his father.  He  reports that he has never smoked. He has never used smokeless tobacco. He reports that he does not currently use alcohol. He reports that he does not currently use drugs after having used the following drugs: Marijuana.  Current Outpatient Medications   Medication Sig Dispense Refill    ascorbic acid (VITAMIN C) 250 mg tablet Take 250 mg by mouth daily      atorvastatin (LIPITOR) 40 mg tablet TAKE 1 TABLET DAILY WITH DINNER 90 tablet 3    BD PEN NEEDLE DEV U/F 32G X 4 MM MISC Inject under the skin 4 (four) times a day 400 each 1    Blood Glucose Monitoring Suppl (ONE TOUCH ULTRA 2) w/Device KIT Use 2 (two) times a day 1 kit 0    escitalopram (LEXAPRO) 5 mg tablet Take 1 tablet (5 mg total) by mouth daily 60 tablet 0    Insulin Glargine  Solostar (Lantus SoloStar) 100 UNIT/ML SOPN 30 units bid 225 mL 0    insulin glulisine (Apidra) 100 units/mL injection Us via sliding scale tid 60 mL 0    levothyroxine 175 mcg tablet TAKE 1 TABLET DAILY EARLY IN THE MORNING 60 tablet 5    omeprazole (PriLOSEC) 40 MG capsule TAKE 1 CAPSULE DAILY (Patient taking differently: daily after dinner) 90 capsule 3    traMADol (Ultram) 50 mg tablet Take 1 tablet (50 mg total) by mouth every 6 (six) hours as needed for moderate pain 40 tablet 0    verapamil (VERELAN PM) 360 MG 24 hr capsule TAKE 1 CAPSULE DAILY AT BEDTIME 90 capsule 3    Vitamin D, Cholecalciferol, 25 MCG (1000 UT) CAPS Take by mouth      zolpidem (AMBIEN) 10 mg tablet Take 1 tablet (10 mg total) by mouth daily at bedtime as needed for sleep 90 tablet 0     No current facility-administered medications for this visit.     Current Outpatient Medications on File Prior to Visit   Medication Sig    ascorbic acid (VITAMIN C) 250 mg tablet Take 250 mg by mouth daily    atorvastatin (LIPITOR) 40 mg tablet TAKE 1 TABLET DAILY WITH DINNER    BD PEN NEEDLE DEV U/F 32G X 4 MM MISC Inject under the skin 4 (four) times a day    Blood Glucose Monitoring Suppl (ONE TOUCH ULTRA 2) w/Device KIT Use 2 (two) times a day    escitalopram (LEXAPRO) 5 mg tablet Take 1 tablet (5 mg total) by mouth daily    Insulin Glargine Solostar (Lantus SoloStar) 100 UNIT/ML SOPN 30 units bid    insulin glulisine (Apidra) 100 units/mL injection Us via sliding scale tid    levothyroxine 175 mcg tablet TAKE 1 TABLET DAILY EARLY IN THE MORNING    omeprazole (PriLOSEC) 40 MG capsule TAKE 1 CAPSULE DAILY (Patient taking differently: daily after dinner)    traMADol (Ultram) 50 mg tablet Take 1 tablet (50 mg total) by mouth every 6 (six) hours as needed for moderate pain    verapamil (VERELAN PM) 360 MG 24 hr capsule TAKE 1 CAPSULE DAILY AT BEDTIME    Vitamin D, Cholecalciferol, 25 MCG (1000 UT) CAPS Take by mouth    zolpidem (AMBIEN) 10 mg tablet Take  "1 tablet (10 mg total) by mouth daily at bedtime as needed for sleep     No current facility-administered medications on file prior to visit.     He is allergic to vancomycin and pollen extract..    Review of Systems  Pertinent items are noted in HPI.     Objective:     /88 (BP Location: Left arm, Patient Position: Sitting, Cuff Size: Large)   Pulse 78   Temp 98.9 °F (37.2 °C) (Tympanic)   Ht 6' 4\" (1.93 m)   Wt 110 kg (243 lb)   SpO2 98%   BMI 29.58 kg/m²   General appearance: alert and oriented, in no acute distress  Head: Normocephalic, without obvious abnormality, atraumatic  Eyes: negative  Ears: normal TM's and external ear canals both ears  Nose: Nares normal. Septum midline. Mucosa normal. No drainage or sinus tenderness.  Throat: normal findings: lips normal without lesions and soft palate, uvula, and tonsils normal  Neck: no adenopathy, no carotid bruit, no JVD, supple, symmetrical, trachea midline, and thyroid not enlarged, symmetric, no tenderness/mass/nodules  Back: symmetric, no curvature. ROM normal. No CVA tenderness.  Lungs: clear to auscultation bilaterally  Heart: regular rate and rhythm, S1, S2 normal, no murmur, click, rub or gallop  Abdomen: soft, non-tender; bowel sounds normal; no masses,  no organomegaly  Extremities: no edema, redness or tenderness in the calves or thighs  Pulses: 2+ and symmetric  Lymph nodes: Cervical, supraclavicular, and axillary nodes normal.  Neurologic: Grossly normal    Predictors of intubation difficulty:   Morbid obesity? no   Anatomically abnormal facies? no   Prominent incisors? no   Receding mandible? no   Short, thick neck? no   Neck range of motion: normal   Mallampati score: II (hard and soft palate, upper portion of tonsils anduvula visible)   Dentition: poor dentition    Cardiographics  ECG:  not done  Echocardiogram: not done    Imaging  Chest x-ray:  not done      Lab Review   Appointment on 01/15/2024   Component Date Value    WBC 01/15/2024 " 7.55     RBC 01/15/2024 4.70     Hemoglobin 01/15/2024 14.4     Hematocrit 01/15/2024 43.8     MCV 01/15/2024 93     MCH 01/15/2024 30.6     MCHC 01/15/2024 32.9     RDW 01/15/2024 13.0     MPV 01/15/2024 12.0     Platelets 01/15/2024 256     nRBC 01/15/2024 0     Neutrophils Relative 01/15/2024 59     Immat GRANS % 01/15/2024 0     Lymphocytes Relative 01/15/2024 27     Monocytes Relative 01/15/2024 11     Eosinophils Relative 01/15/2024 2     Basophils Relative 01/15/2024 1     Neutrophils Absolute 01/15/2024 4.47     Immature Grans Absolute 01/15/2024 0.02     Lymphocytes Absolute 01/15/2024 2.06     Monocytes Absolute 01/15/2024 0.80     Eosinophils Absolute 01/15/2024 0.15     Basophils Absolute 01/15/2024 0.05     Sodium 01/15/2024 138     Potassium 01/15/2024 4.4     Chloride 01/15/2024 105     CO2 01/15/2024 24     ANION GAP 01/15/2024 9     BUN 01/15/2024 20     Creatinine 01/15/2024 0.90     Glucose, Fasting 01/15/2024 118 (H)     Calcium 01/15/2024 9.3     eGFR 01/15/2024 92         Assessment:     58 y.o. male with planned surgery as above.    Known risk factors for perioperative complications: Diabetes mellitus  Renal dysfunction    Difficulty with intubation is not anticipated.    Cardiac Risk Estimation: Moderate risk, Cleared for surgery    Current medications which may produce withdrawal symptoms if withheld perioperatively: none     Plan:     1. Preoperative workup as follows none.  2. Change in medication regimen before surgery:  per anesthesia .  3. Prophylaxis for cardiac events with perioperative beta-blockers: should be considered, specific regimen per anesthesia.  4. Invasive hemodynamic monitoring perioperatively: not indicated.  5. Deep vein thrombosis prophylaxis postoperatively:regimen to be chosen by surgical team.  6. Surveillance for postoperative MI with ECG immediately postoperatively and on postoperative days 1 and 2 AND troponin levels 24 hours postoperatively and on day 4 or  hospital discharge (whichever comes first): not indicated.  7. Other measures:  none

## 2024-01-19 ENCOUNTER — TELEPHONE (OUTPATIENT)
Dept: FAMILY MEDICINE CLINIC | Facility: CLINIC | Age: 59
End: 2024-01-19

## 2024-01-19 NOTE — TELEPHONE ENCOUNTER
Call from Geovani with [facility] [phone] - requesting that Serene addend pre-op clearance note that patient is cleared for SX - they need this done before 1pm today.

## 2024-01-21 NOTE — ANESTHESIA PREPROCEDURE EVALUATION
Procedure:  REMOVAL HARDWARE TIBIA (Right: Leg Lower)  Assessment:   -8 months s/p Right tibial plateau ORIF malunion/nonunion, DOS 5/1/23   -With subsequent known collapse of medial tibial plateau  -Collapse of lateral femoral condyle  -Lateral screw backing out, palpable under skin but no threatened skin     Able to ambulate in TROM, slowly gaining quad strength     Plan:   The patient's x-rays were reviewed today.   Treatment options were discussed with the patient moving forward including continued physical therapy, removal of hardware, total knee arthroplasty, vitamin D and calcium supplementation  Risks of surgery, including but not limited to, anesthesia complications, infection, damage to nerves and blood vessels, blood clots, postoperative stiffness, residual pain, residual weakness, need for subsequent surgery, worsening deformity and even death were discussed at length.  The patient understands the risks and benefits of all mentioned treatment options and has no further questions.   The patient was informed if he wants to move forward with total knee arthroplasty removal of hardware will need to be performed first. Also, his incisions from removal of hardware will need to be healed prior to total knee arthroplasty.   The patient will require pre-operative clearance and evaluation in the form of primary care physician, and obtain blood work.   The patient has elected to move forward with removal of hardware.  Consent for surgery was obtained today.   The patient was provided a prescription of Tramadol to help manage his symptoms.   I will see the patient on the day of surgery.  I will see the patient back 2 weeks post-operatively      Relevant Problems   CARDIO   (+) Hyperlipidemia   (+) Hypertension, essential   (+) Type 2 diabetes mellitus with diabetic peripheral angiopathy without gangrene (HCC)      ENDO   (+) Acquired hypothyroidism   (+) HHS vs DKA   (+) Type 2 diabetes mellitus with diabetic  peripheral angiopathy without gangrene (HCC)      GI/HEPATIC   (+) Gastroesophageal reflux disease without esophagitis      /RENAL   (+) MARKELL (acute kidney injury) (HCC)   (+) Chronic kidney disease-mineral and bone disorder   (+) Stage 3b chronic kidney disease (HCC)      NEURO/PSYCH   (+) Depression and anxiety      PULMONARY   (+) MANNY (obstructive sleep apnea)        Physical Exam    Airway    Mallampati score: III  TM Distance: >3 FB  Neck ROM: full     Dental        Cardiovascular  Cardiovascular exam normal    Pulmonary  Pulmonary exam normal     Other Findings  Very loose teeth , patient understands it might fall out and if it does he is ok with it   Medical Aleksandr:   Assessment:      58 y.o. male with planned surgery as above.    Known risk factors for perioperative complications: Diabetes mellitus  Renal dysfunction    Difficulty with intubation is not anticipated.     Cardiac Risk Estimation: Moderate risk, Cleared for surgery     Current medications which may produce withdrawal symptoms if withheld perioperatively: none      Plan:      1. Preoperative workup as follows none.  2. Change in medication regimen before surgery:  per anesthesia .  3. Prophylaxis for cardiac events with perioperative beta-blockers: should be considered, specific regimen per anesthesia.  4. Invasive hemodynamic monitoring perioperatively: not indicated.  5. Deep vein thrombosis prophylaxis postoperatively:regimen to be chosen by surgical team.  6. Surveillance for postoperative MI with ECG immediately postoperatively and on postoperative days 1 and 2 AND troponin levels 24 hours postoperatively and on day 4 or hospital discharge (whichever comes first): not indicated.  7. Other measures:  none       Cosigned by: Oswaldo Prather MD at 1/21/2024  5:58 PM       Anesthesia Plan  ASA Score- 3     Anesthesia Type- general with ASA Monitors.         Additional Monitors:     Airway Plan: ETT.           Plan Factors-    Chart reviewed.  EKG reviewed. Imaging results reviewed. Existing labs reviewed. Patient summary reviewed.    Patient is not a current smoker.      Obstructive sleep apnea risk education given perioperatively.        Induction- intravenous.    Postoperative Plan- Plan for postoperative opioid use. Planned trial extubation    Informed Consent- Anesthetic plan and risks discussed with patient.  I personally reviewed this patient with the CRNA. Discussed and agreed on the Anesthesia Plan with the CRNA..

## 2024-01-22 ENCOUNTER — ANESTHESIA (OUTPATIENT)
Dept: PERIOP | Facility: HOSPITAL | Age: 59
End: 2024-01-22
Payer: MEDICARE

## 2024-01-22 ENCOUNTER — HOSPITAL ENCOUNTER (OUTPATIENT)
Facility: HOSPITAL | Age: 59
Setting detail: OUTPATIENT SURGERY
Discharge: HOME | End: 2024-01-22
Attending: ORTHOPAEDIC SURGERY | Admitting: ORTHOPAEDIC SURGERY
Payer: MEDICARE

## 2024-01-22 ENCOUNTER — APPOINTMENT (OUTPATIENT)
Dept: RADIOLOGY | Facility: HOSPITAL | Age: 59
End: 2024-01-22
Payer: MEDICARE

## 2024-01-22 VITALS
OXYGEN SATURATION: 98 % | DIASTOLIC BLOOD PRESSURE: 72 MMHG | TEMPERATURE: 97.5 F | BODY MASS INDEX: 29.59 KG/M2 | WEIGHT: 243 LBS | SYSTOLIC BLOOD PRESSURE: 119 MMHG | HEART RATE: 70 BPM | RESPIRATION RATE: 18 BRPM | HEIGHT: 76 IN

## 2024-01-22 DIAGNOSIS — S82.131P: Primary | ICD-10-CM

## 2024-01-22 DIAGNOSIS — Z87.81 S/P ORIF (OPEN REDUCTION INTERNAL FIXATION) FRACTURE: ICD-10-CM

## 2024-01-22 DIAGNOSIS — Z98.890 S/P ORIF (OPEN REDUCTION INTERNAL FIXATION) FRACTURE: ICD-10-CM

## 2024-01-22 LAB
GLUCOSE SERPL-MCNC: 157 MG/DL (ref 65–140)
GLUCOSE SERPL-MCNC: 181 MG/DL (ref 65–140)
HBV SURFACE AG SER QL: NORMAL
HCV AB SER QL: NORMAL
HIV 1+2 AB+HIV1 P24 AG SERPL QL IA: NORMAL
HIV1 P24 AG SER QL: NORMAL

## 2024-01-22 PROCEDURE — 87340 HEPATITIS B SURFACE AG IA: CPT | Performed by: ORTHOPAEDIC SURGERY

## 2024-01-22 PROCEDURE — 87806 HIV AG W/HIV1&2 ANTB W/OPTIC: CPT | Performed by: ORTHOPAEDIC SURGERY

## 2024-01-22 PROCEDURE — 82948 REAGENT STRIP/BLOOD GLUCOSE: CPT

## 2024-01-22 PROCEDURE — 20680 REMOVAL OF IMPLANT DEEP: CPT | Performed by: PHYSICIAN ASSISTANT

## 2024-01-22 PROCEDURE — 20680 REMOVAL OF IMPLANT DEEP: CPT | Performed by: ORTHOPAEDIC SURGERY

## 2024-01-22 PROCEDURE — 86803 HEPATITIS C AB TEST: CPT | Performed by: ORTHOPAEDIC SURGERY

## 2024-01-22 PROCEDURE — 73560 X-RAY EXAM OF KNEE 1 OR 2: CPT

## 2024-01-22 RX ORDER — ONDANSETRON 2 MG/ML
4 INJECTION INTRAMUSCULAR; INTRAVENOUS EVERY 6 HOURS PRN
Status: DISCONTINUED | OUTPATIENT
Start: 2024-01-22 | End: 2024-01-22 | Stop reason: HOSPADM

## 2024-01-22 RX ORDER — SODIUM CHLORIDE, SODIUM LACTATE, POTASSIUM CHLORIDE, CALCIUM CHLORIDE 600; 310; 30; 20 MG/100ML; MG/100ML; MG/100ML; MG/100ML
125 INJECTION, SOLUTION INTRAVENOUS CONTINUOUS
Status: DISCONTINUED | OUTPATIENT
Start: 2024-01-22 | End: 2024-01-22

## 2024-01-22 RX ORDER — MAGNESIUM HYDROXIDE 1200 MG/15ML
LIQUID ORAL AS NEEDED
Status: DISCONTINUED | OUTPATIENT
Start: 2024-01-22 | End: 2024-01-22 | Stop reason: HOSPADM

## 2024-01-22 RX ORDER — LIDOCAINE HYDROCHLORIDE 20 MG/ML
INJECTION, SOLUTION EPIDURAL; INFILTRATION; INTRACAUDAL; PERINEURAL AS NEEDED
Status: DISCONTINUED | OUTPATIENT
Start: 2024-01-22 | End: 2024-01-22

## 2024-01-22 RX ORDER — ACETAMINOPHEN 325 MG/1
975 TABLET ORAL ONCE
Status: COMPLETED | OUTPATIENT
Start: 2024-01-22 | End: 2024-01-22

## 2024-01-22 RX ORDER — FENTANYL CITRATE/PF 50 MCG/ML
50 SYRINGE (ML) INJECTION
Status: COMPLETED | OUTPATIENT
Start: 2024-01-22 | End: 2024-01-22

## 2024-01-22 RX ORDER — ONDANSETRON 2 MG/ML
4 INJECTION INTRAMUSCULAR; INTRAVENOUS ONCE AS NEEDED
Status: COMPLETED | OUTPATIENT
Start: 2024-01-22 | End: 2024-01-22

## 2024-01-22 RX ORDER — CEFAZOLIN SODIUM 2 G/50ML
2000 SOLUTION INTRAVENOUS ONCE
Status: COMPLETED | OUTPATIENT
Start: 2024-01-22 | End: 2024-01-22

## 2024-01-22 RX ORDER — EPHEDRINE SULFATE 50 MG/ML
INJECTION INTRAVENOUS AS NEEDED
Status: DISCONTINUED | OUTPATIENT
Start: 2024-01-22 | End: 2024-01-22

## 2024-01-22 RX ORDER — OXYCODONE HYDROCHLORIDE 5 MG/1
5 TABLET ORAL EVERY 6 HOURS PRN
Qty: 30 TABLET | Refills: 0 | Status: SHIPPED | OUTPATIENT
Start: 2024-01-22

## 2024-01-22 RX ORDER — PROPOFOL 10 MG/ML
INJECTION, EMULSION INTRAVENOUS AS NEEDED
Status: DISCONTINUED | OUTPATIENT
Start: 2024-01-22 | End: 2024-01-22

## 2024-01-22 RX ORDER — ROCURONIUM BROMIDE 10 MG/ML
INJECTION, SOLUTION INTRAVENOUS AS NEEDED
Status: DISCONTINUED | OUTPATIENT
Start: 2024-01-22 | End: 2024-01-22

## 2024-01-22 RX ORDER — HYDROMORPHONE HCL/PF 1 MG/ML
0.5 SYRINGE (ML) INJECTION
Status: DISCONTINUED | OUTPATIENT
Start: 2024-01-22 | End: 2024-01-22 | Stop reason: HOSPADM

## 2024-01-22 RX ORDER — MIDAZOLAM HYDROCHLORIDE 2 MG/2ML
INJECTION, SOLUTION INTRAMUSCULAR; INTRAVENOUS AS NEEDED
Status: DISCONTINUED | OUTPATIENT
Start: 2024-01-22 | End: 2024-01-22

## 2024-01-22 RX ORDER — ONDANSETRON 2 MG/ML
INJECTION INTRAMUSCULAR; INTRAVENOUS AS NEEDED
Status: DISCONTINUED | OUTPATIENT
Start: 2024-01-22 | End: 2024-01-22

## 2024-01-22 RX ORDER — TRAMADOL HYDROCHLORIDE 50 MG/1
50 TABLET ORAL EVERY 6 HOURS SCHEDULED
Status: DISCONTINUED | OUTPATIENT
Start: 2024-01-22 | End: 2024-01-22 | Stop reason: HOSPADM

## 2024-01-22 RX ORDER — ASPIRIN 81 MG/1
81 TABLET, CHEWABLE ORAL EVERY 12 HOURS
Qty: 84 TABLET | Refills: 0 | Status: SHIPPED | OUTPATIENT
Start: 2024-01-22 | End: 2024-03-04

## 2024-01-22 RX ORDER — ASPIRIN 325 MG
325 TABLET, DELAYED RELEASE (ENTERIC COATED) ORAL EVERY 6 HOURS PRN
COMMUNITY

## 2024-01-22 RX ORDER — FENTANYL CITRATE 50 UG/ML
INJECTION, SOLUTION INTRAMUSCULAR; INTRAVENOUS AS NEEDED
Status: DISCONTINUED | OUTPATIENT
Start: 2024-01-22 | End: 2024-01-22

## 2024-01-22 RX ORDER — CHLORHEXIDINE GLUCONATE ORAL RINSE 1.2 MG/ML
15 SOLUTION DENTAL ONCE
Status: COMPLETED | OUTPATIENT
Start: 2024-01-22 | End: 2024-01-22

## 2024-01-22 RX ORDER — BUPIVACAINE HYDROCHLORIDE 2.5 MG/ML
INJECTION, SOLUTION EPIDURAL; INFILTRATION; INTRACAUDAL AS NEEDED
Status: DISCONTINUED | OUTPATIENT
Start: 2024-01-22 | End: 2024-01-22 | Stop reason: HOSPADM

## 2024-01-22 RX ORDER — GLYCOPYRROLATE 0.2 MG/ML
INJECTION INTRAMUSCULAR; INTRAVENOUS AS NEEDED
Status: DISCONTINUED | OUTPATIENT
Start: 2024-01-22 | End: 2024-01-22

## 2024-01-22 RX ORDER — ACETAMINOPHEN 325 MG/1
650 TABLET ORAL EVERY 6 HOURS PRN
Status: DISCONTINUED | OUTPATIENT
Start: 2024-01-22 | End: 2024-01-22 | Stop reason: HOSPADM

## 2024-01-22 RX ORDER — NEOSTIGMINE METHYLSULFATE 1 MG/ML
INJECTION INTRAVENOUS AS NEEDED
Status: DISCONTINUED | OUTPATIENT
Start: 2024-01-22 | End: 2024-01-22

## 2024-01-22 RX ADMIN — EPHEDRINE SULFATE 5 MG: 50 INJECTION, SOLUTION INTRAVENOUS at 09:40

## 2024-01-22 RX ADMIN — CHLORHEXIDINE GLUCONATE 0.12% ORAL RINSE 15 ML: 1.2 LIQUID ORAL at 06:52

## 2024-01-22 RX ADMIN — MIDAZOLAM HYDROCHLORIDE 2 MG: 1 INJECTION, SOLUTION INTRAMUSCULAR; INTRAVENOUS at 07:52

## 2024-01-22 RX ADMIN — ONDANSETRON 4 MG: 2 INJECTION INTRAMUSCULAR; INTRAVENOUS at 08:51

## 2024-01-22 RX ADMIN — EPHEDRINE SULFATE 10 MG: 50 INJECTION, SOLUTION INTRAVENOUS at 08:52

## 2024-01-22 RX ADMIN — PROPOFOL 200 MG: 10 INJECTION, EMULSION INTRAVENOUS at 07:54

## 2024-01-22 RX ADMIN — ACETAMINOPHEN 975 MG: 325 TABLET, FILM COATED ORAL at 06:50

## 2024-01-22 RX ADMIN — GLYCOPYRROLATE 0.4 MCG: 0.2 INJECTION INTRAMUSCULAR; INTRAVENOUS at 09:39

## 2024-01-22 RX ADMIN — EPHEDRINE SULFATE 10 MG: 50 INJECTION, SOLUTION INTRAVENOUS at 08:07

## 2024-01-22 RX ADMIN — FENTANYL CITRATE 50 MCG: 50 INJECTION INTRAMUSCULAR; INTRAVENOUS at 10:15

## 2024-01-22 RX ADMIN — HYDROMORPHONE HYDROCHLORIDE 0.5 MG: 1 INJECTION, SOLUTION INTRAMUSCULAR; INTRAVENOUS; SUBCUTANEOUS at 10:30

## 2024-01-22 RX ADMIN — FENTANYL CITRATE 50 MCG: 50 INJECTION, SOLUTION INTRAMUSCULAR; INTRAVENOUS at 07:54

## 2024-01-22 RX ADMIN — TRAMADOL HYDROCHLORIDE 50 MG: 50 TABLET, COATED ORAL at 11:00

## 2024-01-22 RX ADMIN — NEOSTIGMINE METHYLSULFATE 4 MG: 1 INJECTION INTRAVENOUS at 09:39

## 2024-01-22 RX ADMIN — ROCURONIUM BROMIDE 50 MG: 10 INJECTION, SOLUTION INTRAVENOUS at 07:54

## 2024-01-22 RX ADMIN — LIDOCAINE HYDROCHLORIDE 100 MG: 20 INJECTION, SOLUTION EPIDURAL; INFILTRATION; INTRACAUDAL; PERINEURAL at 07:54

## 2024-01-22 RX ADMIN — FENTANYL CITRATE 50 MCG: 50 INJECTION INTRAMUSCULAR; INTRAVENOUS at 10:05

## 2024-01-22 RX ADMIN — CEFAZOLIN SODIUM 2000 MG: 2 SOLUTION INTRAVENOUS at 08:00

## 2024-01-22 RX ADMIN — SODIUM CHLORIDE, SODIUM LACTATE, POTASSIUM CHLORIDE, AND CALCIUM CHLORIDE 125 ML/HR: .6; .31; .03; .02 INJECTION, SOLUTION INTRAVENOUS at 07:06

## 2024-01-22 RX ADMIN — FENTANYL CITRATE 50 MCG: 50 INJECTION, SOLUTION INTRAMUSCULAR; INTRAVENOUS at 08:18

## 2024-01-22 RX ADMIN — ONDANSETRON 4 MG: 2 INJECTION INTRAMUSCULAR; INTRAVENOUS at 10:02

## 2024-01-22 NOTE — ANESTHESIA POSTPROCEDURE EVALUATION
Post-Op Assessment Note    CV Status:  Stable  Pain Score: 1    Pain management: adequate       Mental Status:  Alert and awake   Hydration Status:  Euvolemic   PONV Controlled:  Controlled   Airway Patency:  Patent  Two or more mitigation strategies used for obstructive sleep apnea. There is a medical reason for not screening for obstructive sleep apnea and/or for not using two or more mitigation strategies   Post Op Vitals Reviewed: Yes      Staff: CRNA               BP   96/61   Temp   97.7   Pulse  61   Resp   11   SpO2   95

## 2024-01-22 NOTE — DISCHARGE INSTR - AVS FIRST PAGE
Discharge Instructions - Orthopedics  Aaron Richards 58 y.o. male MRN: [id number]  Unit/Bed#: [facility] MAIN    Weight Bearing Status:                                           Weight bearing as tolerated right lower extremity in knee immobilizer  /  TROM brace     DVT prophylaxis:  ***    Pain:  Continue analgesics as directed    Dressing Instructions:   Remove dressings in 5 days. Okay To begin showering at that time.  Allow water to flow over the incision sites.  Do not vigorously scrub or soak wounds until otherwise stated      Appt Instructions:   If you do not have your appointment, please call the clinic at [phone]  Otherwise follow up as scheduled.    Contact the office sooner if you experience any increased numbness/tingling in the extremities.      Miscellaneous:

## 2024-01-22 NOTE — INTERVAL H&P NOTE
H&P reviewed. After examining the patient I find no changes in the patients condition since the H&P had been written.    Removal of hardware right knee     Abdomen : soft, present       Vitals:    01/22/24 0646   BP: 114/82   Pulse: 72   Resp: 16   Temp: 98.1 °F (36.7 °C)   SpO2: 99%

## 2024-01-23 NOTE — OP NOTE
OPERATIVE REPORT  PATIENT NAME: Aaron Richards    :  1965  MRN: [id number]  Pt Location: [facility] ROOM 01    SURGERY DATE: 2024    Surgeons and Role:     * Sesar Hilario MD - Primary     * Aaron Wright PA-C - Assisting    Preop Diagnosis:  Post-traumatic osteoarthritis of right knee [M17.31]  Retained orthopaedic hardware    Post-Op Diagnosis Codes:     * Post-traumatic osteoarthritis of right knee [M17.31]  Same    Procedure(s):  Right - REMOVAL HARDWARE R TIBIA (CPT 78870)    Specimen(s):  * No specimens in log *    Estimated Blood Loss:   100 mL    Drains:  * No LDAs found *    Anesthesia Type:   General    Operative Indications:  Catastrophic collapse of fracture/post-traumatic arthritis, migrating and intra-articular hardware     Operative Findings:  See below    Complications:   None    Procedure and Technique:  The patient is a 58-year-old male who is well-known to me for previous right tibial plateau fracture which was initially treated nonoperatively.  As the patient progressed to varus collapse and nonunion, the patient underwent a right tibia nonunion repair.  His history is notable for diabetes and peripheral arterial disease.  Over time, his fracture progressed to collapse and he developed severe posttraumatic arthritis.  His hardware did migrate into the knee joint however the patient has repeatedly deferred removal of hardware.  He now has screws backing out of plate and agrees to undergo removal of hardware.  He understands that after removal, I cannot guarantee stability of his knee joint or ability to ambulate.  He knows that he has a grossly unstable knee joint with severe arthritis and may not be able to return to preoperative ambulatory function.  The ultimate goal for this treatment pathway is to undergo a total knee arthroplasty with one of the joints faculty after healing from removal of hardware procedure.    The patient operative site, laterality, procedure, consent  were verified in the preoperative area and the patient was taken to the operating room.  General anesthesia was induced and a nonsterile tourniquet was applied to the operative extremity and use for less than 120 total minutes.  The operative extremity was prepped and draped in the usual sterile fashion.  After timeout, old lateral and medial tibial plateau incisions were reopened and dissection was carried down through scar layer to the level of the fascia.  Fascia was incised medially and laterally and muscles were elevated.  Hardware was easily identified on both sides.  Screws and plates were removed without incident in their entirety.  Rongeur was utilized to remove scar tissue under the plates on both sides.  Fluoroscopic imaging confirmed removal of all hardware.  Wounds were copiously irrigated sterile saline.  Tourniquet was released and hemostasis was obtained.  Closure to place in the deep layer with heavy Vicryl suture, subcutaneous layer closure to place with Vicryl suture, skin layer closure to place with nylon suture.  Sterile dressing was applied.  Knee immobilizer was applied and the patient will transition to his baseline T ROM brace in the outpatient setting.  All final counts were correct.  I was present for the entire procedure.  The patient was extubated and awakened and taken to the PACU in stable condition.  There were no immediate complications.    There was no qualified resident available for the procedure.  Critical assistance from Ismael Wright PA-C was required for all components of the procedure including but not limited to positioning, soft tissue retraction, assistance with retraction of copious scar tissue for proper visualization and safe removal of all hardware.    The patient will be weightbearing as tolerated on the operative extremity with brace at all times.  He will be cleared for discharge pending adequate pain control.  He may utilize aspirin twice daily for DVT prophylaxis.   We will consider suture removal in 2 weeks.    Patient Disposition:  PACU         SIGNATURE: Sesar Hilario MD  DATE: January 22, 2024  TIME: 9:01 PM

## 2024-01-26 ENCOUNTER — TELEPHONE (OUTPATIENT)
Age: 59
End: 2024-01-26

## 2024-01-26 DIAGNOSIS — F32.A DEPRESSION, UNSPECIFIED DEPRESSION TYPE: ICD-10-CM

## 2024-01-26 RX ORDER — ESCITALOPRAM OXALATE 5 MG/1
5 TABLET ORAL DAILY
Qty: 60 TABLET | Refills: 5 | Status: SHIPPED | OUTPATIENT
Start: 2024-01-26

## 2024-01-26 NOTE — TELEPHONE ENCOUNTER
Received transfer call from pt and he had on 1/22/24 Removal hardware  R tibia.  He had PO bleeding on that day and wife changed dsg.  Pt has continued to have bleeding that goes through gauze and ace wrap.  Wife has been cleaning incision w/sterile saline and dsg incison, which he tells me looks good, and wraps in ace wrap.  She had to change dsg twice yesterday and once today.  It seems like bleeding occurs when he is moving. No appt availble w/Dr Hilario today in [place]. Advised pt to elevate and ice and will await further recommendations from Dr Hilario.    [phone].    Thank you.

## 2024-01-26 NOTE — TELEPHONE ENCOUNTER
Called and spoke w/pt and relayed Dr Hilario's msg.  Pt states he understands. No further questions/concerns at this time.

## 2024-01-26 NOTE — TELEPHONE ENCOUNTER
Caller: Patient     Doctor/Office: Sulaiman     Call regarding :  PO issue-bleeding     Call was transferred to: nurse

## 2024-01-30 ENCOUNTER — TELEPHONE (OUTPATIENT)
Dept: OBGYN CLINIC | Facility: CLINIC | Age: 59
End: 2024-01-30

## 2024-01-30 NOTE — TELEPHONE ENCOUNTER
Called patient to let him know that his hardware was available for pickup. It is located in drawer at check in. Please see Abena

## 2024-02-02 DIAGNOSIS — Z98.890 S/P ORIF (OPEN REDUCTION INTERNAL FIXATION) FRACTURE: Primary | ICD-10-CM

## 2024-02-02 DIAGNOSIS — Z87.81 S/P ORIF (OPEN REDUCTION INTERNAL FIXATION) FRACTURE: Primary | ICD-10-CM

## 2024-02-06 ENCOUNTER — OFFICE VISIT (OUTPATIENT)
Dept: OBGYN CLINIC | Facility: CLINIC | Age: 59
End: 2024-02-06

## 2024-02-06 ENCOUNTER — APPOINTMENT (OUTPATIENT)
Dept: RADIOLOGY | Facility: CLINIC | Age: 59
End: 2024-02-06
Payer: MEDICARE

## 2024-02-06 VITALS
WEIGHT: 243 LBS | HEART RATE: 82 BPM | HEIGHT: 76 IN | BODY MASS INDEX: 29.59 KG/M2 | DIASTOLIC BLOOD PRESSURE: 56 MMHG | SYSTOLIC BLOOD PRESSURE: 82 MMHG

## 2024-02-06 DIAGNOSIS — Z87.81 S/P ORIF (OPEN REDUCTION INTERNAL FIXATION) FRACTURE: ICD-10-CM

## 2024-02-06 DIAGNOSIS — Z98.890 S/P ORIF (OPEN REDUCTION INTERNAL FIXATION) FRACTURE: ICD-10-CM

## 2024-02-06 DIAGNOSIS — Z98.890 S/P HARDWARE REMOVAL: ICD-10-CM

## 2024-02-06 DIAGNOSIS — S82.131P: ICD-10-CM

## 2024-02-06 DIAGNOSIS — M17.31 POST-TRAUMATIC OSTEOARTHRITIS OF RIGHT KNEE: Primary | ICD-10-CM

## 2024-02-06 PROCEDURE — 99024 POSTOP FOLLOW-UP VISIT: CPT | Performed by: ORTHOPAEDIC SURGERY

## 2024-02-06 PROCEDURE — 73560 X-RAY EXAM OF KNEE 1 OR 2: CPT

## 2024-02-06 RX ORDER — OXYCODONE HYDROCHLORIDE 5 MG/1
5 TABLET ORAL EVERY 6 HOURS PRN
Qty: 30 TABLET | Refills: 0 | Status: SHIPPED | OUTPATIENT
Start: 2024-02-06

## 2024-02-06 RX ORDER — CEPHALEXIN 750 MG/1
750 CAPSULE ORAL 4 TIMES DAILY
Qty: 84 CAPSULE | Refills: 0 | Status: SHIPPED | OUTPATIENT
Start: 2024-02-06 | End: 2024-02-27

## 2024-02-06 NOTE — PROGRESS NOTES
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  He was placed on oral Keflex 750 mg QID  Discussed total knee arthroplasty and referral to Dr. Hendrix. Advised leg will need to be clinically without sign of infection/drainage and may also require aspirate to prove negative for infection in joint in future  Oxycodone 5mg was refilled  Incision site was dressed with ABD pads and ace bandage  He will follow up in one week    To Do Next Visit:  Sutures out    _____________________________________________________  CHIEF COMPLAINT:  Chief Complaint   Patient presents with    Right Leg - Post-op         SUBJECTIVE:  Aaron Richards is a 58 y.o. male who presents 2 weeks s/p right tibia removal of hardware.  He admits to pus draining from his incision site for the past week but did not call office to inform. His wife notes that sometimes his knee is so swollen, she can't see the sutures. He is having increased pain in his knee compared to before the hardware removal. Pain is so severe that he is not able to walk.     SOCIAL HISTORY:  Social History     Tobacco Use    Smoking status: Never    Smokeless tobacco: Never   Vaping Use    Vaping status: Never Used   Substance Use Topics    Alcohol use: Not Currently     Comment: quit 1-2023    Drug use: Not Currently     Types: Marijuana       MEDICATIONS:    Current Outpatient Medications:     ascorbic acid (VITAMIN C) 250 mg tablet, Take 250 mg by mouth daily, Disp: , Rfl:     aspirin (ECOTRIN) 325 mg EC tablet, Take 325 mg by mouth every 6 (six) hours as needed for mild pain, Disp: , Rfl:     aspirin 81 mg chewable tablet, Chew 1  tablet (81 mg total) every 12 (twelve) hours, Disp: 84 tablet, Rfl: 0    atorvastatin (LIPITOR) 40 mg tablet, TAKE 1 TABLET DAILY WITH DINNER, Disp: 90 tablet, Rfl: 3    BD PEN NEEDLE DEV U/F 32G X 4 MM MISC, Inject under the skin 4 (four) times a day, Disp: 400 each, Rfl: 1    Blood Glucose Monitoring Suppl (ONE TOUCH ULTRA 2) w/Device KIT, Use 2 (two) times a day, Disp: 1 kit, Rfl: 0    cephalexin (KEFLEX) 750 MG capsule, Take 1 capsule (750 mg total) by mouth 4 (four) times a day for 21 days, Disp: 84 capsule, Rfl: 0    escitalopram (LEXAPRO) 5 mg tablet, TAKE 1 TABLET DAILY, Disp: 60 tablet, Rfl: 5    Insulin Glargine Solostar (Lantus SoloStar) 100 UNIT/ML SOPN, 30 units bid, Disp: 225 mL, Rfl: 0    insulin glulisine (Apidra) 100 units/mL injection, Us via sliding scale tid, Disp: 60 mL, Rfl: 0    levothyroxine 175 mcg tablet, TAKE 1 TABLET DAILY EARLY IN THE MORNING, Disp: 60 tablet, Rfl: 5    omeprazole (PriLOSEC) 40 MG capsule, TAKE 1 CAPSULE DAILY (Patient taking differently: daily after dinner), Disp: 90 capsule, Rfl: 3    oxyCODONE (Roxicodone) 5 immediate release tablet, Take 1 tablet (5 mg total) by mouth every 6 (six) hours as needed for moderate pain Max Daily Amount: 20 mg, Disp: 30 tablet, Rfl: 0    traMADol (Ultram) 50 mg tablet, Take 1 tablet (50 mg total) by mouth every 6 (six) hours as needed for moderate pain, Disp: 40 tablet, Rfl: 0    verapamil (VERELAN PM) 360 MG 24 hr capsule, TAKE 1 CAPSULE DAILY AT BEDTIME, Disp: 90 capsule, Rfl: 3    Vitamin D, Cholecalciferol, 25 MCG (1000 UT) CAPS, Take by mouth, Disp: , Rfl:     zolpidem (AMBIEN) 10 mg tablet, Take 1 tablet (10 mg total) by mouth daily at bedtime as needed for sleep, Disp: 90 tablet, Rfl: 0    REVIEW OF SYSTEMS:  MSK: as noted in HPI  Neuro: WNL  Pertinent items are otherwise noted in HPI.  A comprehensive review of systems was otherwise negative.    _____________________________________________________  PHYSICAL EXAMINATION:  Vital  "signs: BP (!) 82/56   Pulse 82   Ht 6' 4\" (1.93 m)   Wt 110 kg (243 lb)   BMI 29.58 kg/m²   General: No acute distress, awake and alert  Psychiatric: Mood and affect appear appropriate  HEENT: Trachea Midline, No torticollis, no apparent facial trauma  Cardiovascular: No audible murmurs; Extremities appear perfused  Pulmonary: No audible wheezing or stridor  Skin: No open lesions; see further details (if any) below    MUSCULOSKELETAL EXAMINATION:  Extremities:    Right Knee  Range of motion from 0 to 90.    Moderate effusion  Sutures intact  Clear drainage at incision site  Sutures intact and will remain for another week  The patient is able to perform a straight leg raise.    The patient is neurovascular intact distally.        _____________________________________________________  STUDIES REVIEWED:  I personally reviewed the images and interpretation is as follows:  X-rays of the left knee demonstrate s/p hardware removal. Severe degenerative changes.     PROCEDURES PERFORMED:  Procedures  None.       Scribe Attestation      I,:  Tawny Patel am acting as a scribe while in the presence of the attending physician.:       I,:  Sesar Hilario MD personally performed the services described in this documentation    as scribed in my presence.:             "

## 2024-02-12 ENCOUNTER — TELEPHONE (OUTPATIENT)
Dept: OBGYN CLINIC | Facility: HOSPITAL | Age: 59
End: 2024-02-12

## 2024-02-12 DIAGNOSIS — E11.42 TYPE 2 DIABETES MELLITUS WITH DIABETIC POLYNEUROPATHY, WITH LONG-TERM CURRENT USE OF INSULIN (HCC): ICD-10-CM

## 2024-02-12 DIAGNOSIS — Z79.4 TYPE 2 DIABETES MELLITUS WITH DIABETIC POLYNEUROPATHY, WITH LONG-TERM CURRENT USE OF INSULIN (HCC): ICD-10-CM

## 2024-02-12 RX ORDER — INSULIN GLARGINE 100 [IU]/ML
INJECTION, SOLUTION SUBCUTANEOUS
Qty: 225 ML | Refills: 3 | Status: SHIPPED | OUTPATIENT
Start: 2024-02-12

## 2024-02-12 NOTE — TELEPHONE ENCOUNTER
Caller: Patient    Doctor: Sulaiman    Reason for call: Patient needs to reschedule his post op scheduled for 2/13/24.     Call back#: [phone]

## 2024-02-20 ENCOUNTER — OFFICE VISIT (OUTPATIENT)
Dept: OBGYN CLINIC | Facility: CLINIC | Age: 59
End: 2024-02-20

## 2024-02-20 VITALS
HEIGHT: 76 IN | DIASTOLIC BLOOD PRESSURE: 84 MMHG | HEART RATE: 81 BPM | WEIGHT: 243 LBS | BODY MASS INDEX: 29.59 KG/M2 | SYSTOLIC BLOOD PRESSURE: 146 MMHG

## 2024-02-20 DIAGNOSIS — M17.31 POST-TRAUMATIC OSTEOARTHRITIS OF RIGHT KNEE: ICD-10-CM

## 2024-02-20 DIAGNOSIS — Z98.890 S/P HARDWARE REMOVAL: ICD-10-CM

## 2024-02-20 DIAGNOSIS — S82.131P: ICD-10-CM

## 2024-02-20 PROCEDURE — 99024 POSTOP FOLLOW-UP VISIT: CPT | Performed by: ORTHOPAEDIC SURGERY

## 2024-02-20 NOTE — PATIENT INSTRUCTIONS
- Weight bearing as tolerated right lower extremity in TROM brace   - Sutures removed in the office.  Patient tolerated well.   - Okay To begin showering.  Allow water to flow over the incision sites.  Do not vigorously scrub or soak wounds until otherwise stated   - Continue Antibiotics as previously prescribed   - Begin home exercise program as directed   - Over the counter analgesics as needed / directed   - Ice / heat as directed   - Referral placed in system for Dr. Hendrix for discussion of further surgical intervention   - Follow up 4 weeks

## 2024-02-20 NOTE — PROGRESS NOTES
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  Patient tolerated well.   - Okay To begin showering.  Allow water to flow over the incision sites.  Do not vigorously scrub or soak wounds until otherwise stated   - Continue Antibiotics as previously prescribed   - Begin home exercise program as directed   - Over the counter analgesics as needed / directed   - Ice / heat as directed   - Referral placed in system for Dr. Hendrix for discussion of TKA vs fusion. Advised may require knee aspirate and analysis to ensure no deep infection, possible open biopsy/tissue analysis  - Follow up 4 weeks with myself, consult with Dr. Hendrix ASAP      To Do Next Visit:  Evaluate knee pain   _____________________________________________________  CHIEF COMPLAINT:  Chief Complaint   Patient presents with    Right Leg - Post-op         SUBJECTIVE:  Aaron Richards is a 58 y.o. 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  Patient states that his knee is doing well overall.  Patient states he has minimal pain currently.  Patient states he completed his course of oral antibiotics recently.  Patient states that the lateral incision has been bleeding regularly and states that he was experiencing pus coming out of the leg but states that that has subsided over the past 48 hours.  Patient denies any fevers or chills.   Patient denies any shortness of breath chest tightness chest pain.  Patient offers no other complaints at this time    SOCIAL HISTORY:  Social History     Tobacco Use    Smoking status: Never    Smokeless tobacco: Never   Vaping Use    Vaping status: Never Used   Substance Use Topics    Alcohol use: Not Currently     Comment: quit 1-2023    Drug use: Not Currently     Types: Marijuana       MEDICATIONS:    Current Outpatient Medications:     ascorbic acid (VITAMIN C) 250 mg tablet, Take 250 mg by mouth daily, Disp: , Rfl:     aspirin (ECOTRIN) 325 mg EC tablet, Take 325 mg by mouth every 6 (six) hours as needed for mild pain, Disp: , Rfl:     aspirin 81 mg chewable tablet, Chew 1 tablet (81 mg total) every 12 (twelve) hours, Disp: 84 tablet, Rfl: 0    atorvastatin (LIPITOR) 40 mg tablet, TAKE 1 TABLET DAILY WITH DINNER, Disp: 90 tablet, Rfl: 3    BD PEN NEEDLE DEV U/F 32G X 4 MM MISC, Inject under the skin 4 (four) times a day, Disp: 400 each, Rfl: 1    Blood Glucose Monitoring Suppl (ONE TOUCH ULTRA 2) w/Device KIT, Use 2 (two) times a day, Disp: 1 kit, Rfl: 0    cephalexin (KEFLEX) 750 MG capsule, Take 1 capsule (750 mg total) by mouth 4 (four) times a day for 21 days, Disp: 84 capsule, Rfl: 0    escitalopram (LEXAPRO) 5 mg tablet, TAKE 1 TABLET DAILY, Disp: 60 tablet, Rfl: 5    Insulin Glargine Solostar (Lantus SoloStar) 100 UNIT/ML SOPN, INJECT 45 UNITS TWICE A DAY, Disp: 225 mL, Rfl: 3    insulin glulisine (Apidra) 100 units/mL injection, Us via sliding scale tid, Disp: 60 mL, Rfl: 0    levothyroxine 175 mcg tablet, TAKE 1 TABLET DAILY EARLY IN THE MORNING, Disp: 60 tablet, Rfl: 5    omeprazole (PriLOSEC) 40 MG capsule, TAKE 1 CAPSULE DAILY (Patient taking differently: daily after dinner), Disp: 90 capsule, Rfl: 3    verapamil (VERELAN PM) 360 MG 24 hr capsule, TAKE 1 CAPSULE DAILY AT BEDTIME, Disp: 90 capsule, Rfl: 3    Vitamin D, Cholecalciferol, 25 MCG (1000 UT) CAPS, Take by mouth, Disp: , Rfl:      "zolpidem (AMBIEN) 10 mg tablet, Take 1 tablet (10 mg total) by mouth daily at bedtime as needed for sleep, Disp: 90 tablet, Rfl: 0    oxyCODONE (Roxicodone) 5 immediate release tablet, Take 1 tablet (5 mg total) by mouth every 6 (six) hours as needed for moderate pain Max Daily Amount: 20 mg (Patient not taking: Reported on 2/20/2024), Disp: 30 tablet, Rfl: 0    traMADol (Ultram) 50 mg tablet, Take 1 tablet (50 mg total) by mouth every 6 (six) hours as needed for moderate pain (Patient not taking: Reported on 2/20/2024), Disp: 40 tablet, Rfl: 0    REVIEW OF SYSTEMS:  MSK: right leg pain   Neuro: WNL   Pertinent items are otherwise noted in HPI.  A comprehensive review of systems was otherwise negative.    _____________________________________________________  PHYSICAL EXAMINATION:  Vital signs: /84   Pulse 81   Ht 6' 4\" (1.93 m)   Wt 110 kg (243 lb)   BMI 29.58 kg/m²   General: No acute distress, awake and alert  Psychiatric: Mood and affect appear appropriate  HEENT: Trachea Midline, No torticollis, no apparent facial trauma  Cardiovascular: No audible murmurs; Extremities appear perfused  Pulmonary: No audible wheezing or stridor  Skin: No open lesions; see further details (if any) below      MUSCULOSKELETAL EXAMINATION:  Extremities:    Right Knee  - Patient sitting comfortably in the office in no apparent distress   - Deformity of the knee noted.  Incision sites intact.  Sutures intact.  No drainage present today.   Mild erythema noted throughout the lateral incision site.  See below for pictures    - 0 to 90 degrees of range of motion of the knee  - NV intact    _____________________________________________________  STUDIES REVIEWED:  I personally reviewed the images and interpretation is as follows:  N/A         PROCEDURES PERFORMED:  Suture removal    Date/Time: 2/20/2024 2:00 PM    Performed by: Sesar Hilario MD  Authorized by: Sesar Hilario MD  Universal Protocol:  Consent: Verbal " "consent obtained.  Risks and benefits: risks, benefits and alternatives were discussed  Consent given by: patient  Patient understanding: patient states understanding of the procedure being performed  Patient identity confirmed: verbally with patient      Patient location:  Bedside  Location:     Laterality:  Right    Location:  Lower extremity    Lower extremity location:  Knee    Knee location:  R knee  Procedure details:     Tools used:  Suture removal kit    Wound appearance:  Warm, good wound healing, nontender and draining  Post-procedure details:     Post-removal:  Steri-Strips applied    Patient tolerance of procedure:  Tolerated well, no immediate complications                  Aaron Wright PA-C - assisting  Sesar Hilario MD                        Portions of the record may have been created with voice recognition software.  Occasional wrong word or \"sound a like\" substitutions may have occurred due to the inherent limitations of voice recognition software.  Read the chart carefully and recognize, using context, where substitutions have occurred.      "

## 2024-03-01 ENCOUNTER — TELEPHONE (OUTPATIENT)
Age: 59
End: 2024-03-01

## 2024-03-01 NOTE — TELEPHONE ENCOUNTER
Caller: patient    Doctor: Simeon    Reason for call: pt called to have his name removed the the wait list.  He is ok with his current appt date with Dr Gilson    Call back#:

## 2024-03-02 DIAGNOSIS — Z79.4 TYPE 2 DIABETES MELLITUS WITH HYPERGLYCEMIA, WITH LONG-TERM CURRENT USE OF INSULIN (HCC): ICD-10-CM

## 2024-03-02 DIAGNOSIS — E11.65 TYPE 2 DIABETES MELLITUS WITH HYPERGLYCEMIA, WITH LONG-TERM CURRENT USE OF INSULIN (HCC): ICD-10-CM

## 2024-03-04 RX ORDER — PEN NEEDLE, DIABETIC 32GX 5/32"
NEEDLE, DISPOSABLE MISCELLANEOUS 4 TIMES DAILY
Qty: 400 EACH | Refills: 0 | Status: SHIPPED | OUTPATIENT
Start: 2024-03-04

## 2024-03-21 ENCOUNTER — OFFICE VISIT (OUTPATIENT)
Dept: OBGYN CLINIC | Facility: CLINIC | Age: 59
End: 2024-03-21

## 2024-03-21 VITALS
HEIGHT: 76 IN | WEIGHT: 240 LBS | BODY MASS INDEX: 29.22 KG/M2 | HEART RATE: 81 BPM | SYSTOLIC BLOOD PRESSURE: 125 MMHG | DIASTOLIC BLOOD PRESSURE: 79 MMHG

## 2024-03-21 DIAGNOSIS — Z98.890 S/P HARDWARE REMOVAL: Primary | ICD-10-CM

## 2024-03-21 DIAGNOSIS — M17.31 POST-TRAUMATIC OSTEOARTHRITIS OF RIGHT KNEE: ICD-10-CM

## 2024-03-21 PROCEDURE — 99024 POSTOP FOLLOW-UP VISIT: CPT | Performed by: ORTHOPAEDIC SURGERY

## 2024-03-21 RX ORDER — TRAMADOL HYDROCHLORIDE 50 MG/1
50 TABLET ORAL EVERY 6 HOURS PRN
Qty: 30 TABLET | Refills: 0 | Status: SHIPPED | OUTPATIENT
Start: 2024-03-21

## 2024-03-21 NOTE — PROGRESS NOTES
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 Hendrix   Advised patient he will likely require additional workup which may invlude but not limited to MRI, knee aspiration, labwork, etc before a surgery is scheduled  - Over the counter analgesics as needed / directed   - Ice / heat as directed  - Follow up PRN pending results of plan with Dr. Hendrix      To Do Next Visit:  Evaluate knee pain   _____________________________________________________  CHIEF COMPLAINT:  Chief Complaint   Patient presents with    Right Knee - Follow-up         SUBJECTIVE:  Aaron Richards is a 59 y.o. 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  Patient states that he continues to have knee pain.  Patient states that last week he began noticed area of swelling noted on the medial aspect of his knee that was subsequently popped.  Patient states that clear fluid was expelled from the area swelling.  Patient states that he has had episodes of intermittent drainage coming from the area as well.  Patient denies any fevers and chills.  Patient denies any new or worsening symptoms in his leg.  Patient has appoint with Dr. Hendrix next week for For discussion of additional surgical intervention.  Patient does admit to having numbness on the medial aspect of  his leg. Patient completed course of ABX on 2/27/24. Patient offers no other complaints at this time.    SOCIAL HISTORY:  Social History     Tobacco Use    Smoking status: Never    Smokeless tobacco: Never   Vaping Use    Vaping status: Never Used   Substance Use Topics    Alcohol use: Not Currently     Comment: quit 1-2023    Drug use: Not Currently     Types: Marijuana       MEDICATIONS:    Current Outpatient Medications:     ascorbic acid (VITAMIN C) 250 mg tablet, Take 250 mg by mouth daily, Disp: , Rfl:     aspirin (ECOTRIN) 325 mg EC tablet, Take 325 mg by mouth every 6 (six) hours as needed for mild pain, Disp: , Rfl:     atorvastatin (LIPITOR) 40 mg tablet, TAKE 1 TABLET DAILY WITH DINNER, Disp: 90 tablet, Rfl: 3    BD Pen Needle [place] U/F 32G X 4 MM MISC, Inject under the skin 4 (four) times a day, Disp: 400 each, Rfl: 0    Blood Glucose Monitoring Suppl (ONE TOUCH ULTRA 2) w/Device KIT, Use 2 (two) times a day, Disp: 1 kit, Rfl: 0    escitalopram (LEXAPRO) 5 mg tablet, TAKE 1 TABLET DAILY, Disp: 60 tablet, Rfl: 5    Insulin Glargine Solostar (Lantus SoloStar) 100 UNIT/ML SOPN, INJECT 45 UNITS TWICE A DAY, Disp: 225 mL, Rfl: 3    insulin glulisine (Apidra) 100 units/mL injection, Us via sliding scale tid, Disp: 60 mL, Rfl: 0    levothyroxine 175 mcg tablet, TAKE 1 TABLET DAILY EARLY IN THE MORNING, Disp: 60 tablet, Rfl: 5    omeprazole (PriLOSEC) 40 MG capsule, TAKE 1 CAPSULE DAILY (Patient taking differently: daily after dinner), Disp: 90 capsule, Rfl: 3    verapamil (VERELAN PM) 360 MG 24 hr capsule, TAKE 1 CAPSULE DAILY AT BEDTIME, Disp: 90 capsule, Rfl: 3    Vitamin D, Cholecalciferol, 25 MCG (1000 UT) CAPS, Take by mouth, Disp: , Rfl:     zolpidem (AMBIEN) 10 mg tablet, Take 1 tablet (10 mg total) by mouth daily at bedtime as needed for sleep, Disp: 90 tablet, Rfl: 0    aspirin 81 mg chewable tablet, Chew 1 tablet (81 mg total) every 12 (twelve) hours, Disp: 84 tablet, Rfl: 0    oxyCODONE (Roxicodone) 5  "immediate release tablet, Take 1 tablet (5 mg total) by mouth every 6 (six) hours as needed for moderate pain Max Daily Amount: 20 mg (Patient not taking: Reported on 3/21/2024), Disp: 30 tablet, Rfl: 0    traMADol (Ultram) 50 mg tablet, Take 1 tablet (50 mg total) by mouth every 6 (six) hours as needed for moderate pain (Patient not taking: Reported on 2/20/2024), Disp: 40 tablet, Rfl: 0    REVIEW OF SYSTEMS:  MSK: RIGHT LEG PAIN   Neuro: WNL   Pertinent items are otherwise noted in HPI.  A comprehensive review of systems was otherwise negative.    _____________________________________________________  PHYSICAL EXAMINATION:  Vital signs: /79   Pulse 81   Ht 6' 4\" (1.93 m)   Wt 109 kg (240 lb)   BMI 29.21 kg/m²   General: No acute distress, awake and alert  Psychiatric: Mood and affect appear appropriate  HEENT: Trachea Midline, No torticollis, no apparent facial trauma  Cardiovascular: No audible murmurs; Extremities appear perfused  Pulmonary: No audible wheezing or stridor  Skin: No open lesions; see further details (if any) below      MUSCULOSKELETAL EXAMINATION:  Right leg examination:  - Patient sitting comfortably in the office in no apparent distress   -Varus deformity of knee present.  Moderate joint effusion present.  Healed incision sites noted throughout the lower extremity with a 1 cm x 0.5 cm area of swelling noted at the distal aspect of the medial incision no active drainage present. Minimal erythema noted   -Tenderness palpation noted throughout the knee joint.  No other bony or soft tissue tenderness to palpation noted at this time.  -Patient able to extend the knee 20 degrees off of neutral.  90 degrees of flexion appreciated  - NV intact      _____________________________________________________  STUDIES REVIEWED:  I personally reviewed the images and interpretation is as follows:  N/A         PROCEDURES PERFORMED:  No procedures were performed at this time. " "                    Aaron Wright PA-C - assisting  Sesar Hilario MD                                Portions of the record may have been created with voice recognition software.  Occasional wrong word or \"sound a like\" substitutions may have occurred due to the inherent limitations of voice recognition software.  Read the chart carefully and recognize, using context, where substitutions have occurred.    "

## 2024-03-25 ENCOUNTER — OFFICE VISIT (OUTPATIENT)
Dept: OBGYN CLINIC | Facility: MEDICAL CENTER | Age: 59
End: 2024-03-25
Payer: MEDICARE

## 2024-03-25 VITALS
SYSTOLIC BLOOD PRESSURE: 119 MMHG | HEART RATE: 75 BPM | HEIGHT: 76 IN | DIASTOLIC BLOOD PRESSURE: 79 MMHG | BODY MASS INDEX: 29.22 KG/M2 | WEIGHT: 240 LBS

## 2024-03-25 DIAGNOSIS — M17.31 POST-TRAUMATIC OSTEOARTHRITIS OF RIGHT KNEE: Primary | ICD-10-CM

## 2024-03-25 DIAGNOSIS — Z79.4 TYPE 2 DIABETES MELLITUS WITH DIABETIC POLYNEUROPATHY, WITH LONG-TERM CURRENT USE OF INSULIN (HCC): ICD-10-CM

## 2024-03-25 DIAGNOSIS — G89.29 CHRONIC PAIN OF RIGHT KNEE: ICD-10-CM

## 2024-03-25 DIAGNOSIS — M25.561 CHRONIC PAIN OF RIGHT KNEE: ICD-10-CM

## 2024-03-25 DIAGNOSIS — S82.131P: ICD-10-CM

## 2024-03-25 DIAGNOSIS — E11.42 TYPE 2 DIABETES MELLITUS WITH DIABETIC POLYNEUROPATHY, WITH LONG-TERM CURRENT USE OF INSULIN (HCC): ICD-10-CM

## 2024-03-25 PROCEDURE — 20610 DRAIN/INJ JOINT/BURSA W/O US: CPT | Performed by: STUDENT IN AN ORGANIZED HEALTH CARE EDUCATION/TRAINING PROGRAM

## 2024-03-25 PROCEDURE — 99214 OFFICE O/P EST MOD 30 MIN: CPT | Performed by: STUDENT IN AN ORGANIZED HEALTH CARE EDUCATION/TRAINING PROGRAM

## 2024-03-25 NOTE — PROGRESS NOTES
Knee New Office Note    Assessment:     1. Post-traumatic osteoarthritis of right knee    2. Closed fracture of medial portion of right tibial plateau with malunion, subsequent encounter    3. Type 2 diabetes mellitus with diabetic polyneuropathy, with long-term current use of insulin ([facility])    4. 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  Imaging and prognosis discussed with the patient and his wife today.  He has a severe collapse of his tibial plateau ORIF that has resulted in posttraumatic OA. He has a large effusion on exam with significant swelling and loss of range of motion.  We discussed the work up for his right knee to eval for infection in both the joint as well as potential osteomyelitis. An MRI of the right knee was ordered with and without contrast to eval for osteomyelitis.  Right knee aspirated for 45ccs of blood tinged synovial fluid and sent for synovasure testing.  We will see him back in a few weeks to discuss results.  We did discuss surgical treatment options depending on what his labs show, V/V constrained/hinged TKA vs antibiotic fusion.  We will discuss this more when he comes back to discuss results.    Subjective:     Patient ID: Aaron Richards is a 59 y.o. male.  Chief Complaint:  HPI:  59 y.o. male who presents today for an evaluation of his RIGHT knee.  He has a history of a Right  tibial plateau fracture with subsequent ORIF from 5/1/23 with Dr. Hilario.  He has had a nonunion of his right tibial plateau ORIF that has resulted in collapse/severe post traumatic OA.  He then had removal of hardware of this right tibia on 1/22/24 with Dr. Hilario. Since that time he has been having significant pain in the right knee along with swelling of the knee. He has been unable to walk on his knee for several months.  He presents today in a wheelchair.  A week ago he noticed an area of swelling noted on the medial aspect of his knee that was subsequently popped with drainage present.  Since that time the area has healed. Patient completed course of ABX on 2/27/24.  He is here today to discuss surgical options at the referral of Dr. Hilario.    Allergy:  Allergies   Allergen Reactions    Vancomycin Other (See Comments)     Kidney issues    Pollen Extract Eye Swelling     Eyes get watery      Medications:  all current active meds have been reviewed  Past Medical History:  Past Medical History:   Diagnosis Date    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    Chronic kidney disease     Colon polyp     Constipation 03/09/2023    Diabetes mellitus (HCC)     Disease of thyroid gland     GERD (gastroesophageal reflux disease)     HHS vs DKA 11/16/2018    Hyperlipidemia     Hypertension     Thyroid cancer (HCC)      Past Surgical History:  Past Surgical History:   Procedure Laterality Date    COLONOSCOPY      FOOT AMPUTATION Right     FOOT SURGERY Right 2014    IR AORTAGRAM WITH RUN-OFF  08/06/2020    IR TUNNELED CENTRAL LINE PLACEMENT  09/08/2020    IR TUNNELED CENTRAL LINE REMOVAL  09/28/2020    NC AMPUTATION FOOT TRANSMETARSAL Right 09/03/2020    Procedure: AMPUTATION TRANSMETATARSAL (TMA);  Surgeon: Tracy Murillo DPM;  [facility];  Service: Podiatry    NC OPEN TX TIBIAL FRACTURE PROXIMAL UNICONDYLAR Right 5/1/2023    Procedure: OPEN REDUCTION W/ INTERNAL FIXATION (ORIF) RIGHT TIBIAL PLATEAU  NONUNION;  Surgeon: Sesar Hilario MD;  [facility];  Service: Orthopedics    ME REMOVAL IMPLANT DEEP Right 1/22/2024    Procedure: REMOVAL HARDWARE TIBIA;  Surgeon: Sesar Hilario MD;  [facility];  Service: Orthopedics    ME THYROIDECTOMY TOTAL/COMPLETE N/A 02/03/2021    Procedure: TOTAL THYROIDECTOMY WITH NIMS MONITORING;  Surgeon: Sesar Garvey MD;  [facility];  Service: ENT    TOE AMPUTATION Right 08/11/2020    Procedure: AMPUTATION TOE;  Surgeon: Tracy Murillo DPM;  [facility];  Service: Podiatry    US GUIDED THYROID BIOPSY  07/02/2020     Family History:  Family History   Problem Relation Age of Onset    Cancer Mother     Hypertension Father      Social History:  Social History     Substance and Sexual Activity   Alcohol Use Not Currently    Comment: quit 1-2023     Social History     Substance and Sexual Activity   Drug Use Not Currently    Types: Marijuana     Social History     Tobacco Use   Smoking Status Never   Smokeless Tobacco Never           ROS:  General: Per HPI  Skin: Negative, except if noted below  HEENT: Negative  Respiratory: Negative  Cardiovascular: Negative  Gastrointestinal: Negative  Urinary: Negative  Vascular: Negative  Musculoskeletal: Positive per HPI   Neurologic: Positive per HPI  Endocrine: Negative    Objective:  BP Readings from Last 1 Encounters:   03/25/24 119/79      Wt Readings from Last 1 Encounters:   03/25/24 109 kg (240 lb)        Respiratory:   non-labored respirations    Lymphatics:  no palpable lymph nodes    Gait:   In wheelchair    Neurologic:   Alert and oriented times 3  Patient with normal sensation except as noted below  Deep tendon reflexes 2+ except as noted in MSK exam    Bilateral Lower Extremity:  RIght Knee:      Inspection:  skin intact, well healed surgical incision.  No active drainage noted.    Overall limb alignment varus    Effusion: massive    ROM 10-90 with pain    Extensor Lag: none    Palpation:  "diffuse Joint line tenderness to palpation    AP Stability at 90 deg +instability    M/L stability in full extension +instability    M/L stability in midflexion +instability    Motor: 5/5 Q/HS/TA/GS/P    Pulses: 2+ DP / 2+ PT    SILT DP/SP/S/S/TN      Imaging:  My interpretation XR AP scanogram/AP bilateral knee/lateral/yu/sunrise right knee: post-traumatic osteoarthritis with severe bone loss of the tibial plateau, fragmented medial plateau, previous hardware tracts, popliteal stent. No dislocation.Lateral subluxation.     Large joint arthrocentesis: R knee  Universal Protocol:  Consent: Verbal consent obtained.  Risks and benefits: risks, benefits and alternatives were discussed  Consent given by: patient  Patient understanding: patient states understanding of the procedure being performed  Supporting Documentation  Indications: pain, joint swelling and diagnostic evaluation   Procedure Details  Location: knee - R knee  Preparation: Patient was prepped and draped in the usual sterile fashion  Needle size: 18 G  Approach: lateral    Aspirate amount: 45 mL  Aspirate: serous, blood-tinged and clear  Analysis: fluid sample sent for laboratory analysis  Patient tolerance: patient tolerated the procedure well with no immediate complications  Dressing:  Sterile dressing applied            BMI:   Estimated body mass index is 29.21 kg/m² as calculated from the following:    Height as of this encounter: 6' 4\" (1.93 m).    Weight as of this encounter: 109 kg (240 lb).  BSA:   Estimated body surface area is 2.4 meters squared as calculated from the following:    Height as of this encounter: 6' 4\" (1.93 m).    Weight as of this encounter: 109 kg (240 lb).           Scribe Attestation      I,:  Dionne Kuhn PA-C am acting as a scribe while in the presence of the attending physician.:       I,:  Jorge Hendrix, DO personally performed the services described in this documentation    as scribed in my presence.:  "

## 2024-03-28 ENCOUNTER — HOSPITAL ENCOUNTER (OUTPATIENT)
Dept: MRI IMAGING | Facility: HOSPITAL | Age: 59
End: 2024-03-28
Payer: MEDICARE

## 2024-03-28 DIAGNOSIS — M17.31 POST-TRAUMATIC OSTEOARTHRITIS OF RIGHT KNEE: ICD-10-CM

## 2024-03-28 DIAGNOSIS — S82.131P: ICD-10-CM

## 2024-03-28 PROCEDURE — 73723 MRI JOINT LWR EXTR W/O&W/DYE: CPT

## 2024-03-28 PROCEDURE — A9585 GADOBUTROL INJECTION: HCPCS | Performed by: PHYSICIAN ASSISTANT

## 2024-03-28 RX ORDER — GADOBUTROL 604.72 MG/ML
10 INJECTION INTRAVENOUS
Status: COMPLETED | OUTPATIENT
Start: 2024-03-28 | End: 2024-03-28

## 2024-03-28 RX ADMIN — GADOBUTROL 10 ML: 604.72 INJECTION INTRAVENOUS at 16:15

## 2024-04-08 ENCOUNTER — TELEPHONE (OUTPATIENT)
Dept: OBGYN CLINIC | Facility: MEDICAL CENTER | Age: 59
End: 2024-04-08

## 2024-04-08 ENCOUNTER — TELEPHONE (OUTPATIENT)
Age: 59
End: 2024-04-08

## 2024-04-08 NOTE — TELEPHONE ENCOUNTER
Yes patient can be forced onto the schedule, he should have been made an appointment when he checked out after his last visit.  Thank you.

## 2024-04-08 NOTE — TELEPHONE ENCOUNTER
Hello,  Please advise if the following patient can be forced onto the schedule:    Patient: Aaron Richards    : 1965    MRN: [id number]    Call back #: [phone]    Insurance: Medicare    Reason for appointment: MRI results    Requested doctor and/or location: Simeon Vila      Thank you.

## 2024-04-22 ENCOUNTER — OFFICE VISIT (OUTPATIENT)
Dept: OBGYN CLINIC | Facility: MEDICAL CENTER | Age: 59
End: 2024-04-22
Payer: MEDICARE

## 2024-04-22 VITALS
BODY MASS INDEX: 29.22 KG/M2 | HEIGHT: 76 IN | SYSTOLIC BLOOD PRESSURE: 131 MMHG | HEART RATE: 78 BPM | DIASTOLIC BLOOD PRESSURE: 81 MMHG | WEIGHT: 240 LBS

## 2024-04-22 DIAGNOSIS — M25.59 PAIN IN OTHER SPECIFIED JOINT: ICD-10-CM

## 2024-04-22 DIAGNOSIS — M25.561 CHRONIC PAIN OF RIGHT KNEE: ICD-10-CM

## 2024-04-22 DIAGNOSIS — G89.29 CHRONIC PAIN OF RIGHT KNEE: ICD-10-CM

## 2024-04-22 DIAGNOSIS — S82.131P: ICD-10-CM

## 2024-04-22 DIAGNOSIS — M17.31 POST-TRAUMATIC OSTEOARTHRITIS OF RIGHT KNEE: Primary | ICD-10-CM

## 2024-04-22 PROCEDURE — 99215 OFFICE O/P EST HI 40 MIN: CPT | Performed by: STUDENT IN AN ORGANIZED HEALTH CARE EDUCATION/TRAINING PROGRAM

## 2024-04-22 RX ORDER — FOLIC ACID 1 MG/1
1 TABLET ORAL DAILY
Qty: 30 TABLET | Refills: 1 | Status: SHIPPED | OUTPATIENT
Start: 2024-04-22

## 2024-04-22 RX ORDER — TRANEXAMIC ACID 10 MG/ML
1000 INJECTION, SOLUTION INTRAVENOUS ONCE
OUTPATIENT
Start: 2024-04-22 | End: 2024-04-22

## 2024-04-22 RX ORDER — CHLORHEXIDINE GLUCONATE ORAL RINSE 1.2 MG/ML
15 SOLUTION DENTAL ONCE
OUTPATIENT
Start: 2024-04-22 | End: 2024-04-22

## 2024-04-22 RX ORDER — ACETAMINOPHEN 325 MG/1
975 TABLET ORAL ONCE
OUTPATIENT
Start: 2024-04-22 | End: 2024-04-22

## 2024-04-22 RX ORDER — ASCORBIC ACID 500 MG
500 TABLET ORAL 2 TIMES DAILY
Qty: 60 TABLET | Refills: 1 | Status: SHIPPED | OUTPATIENT
Start: 2024-04-22

## 2024-04-22 RX ORDER — CEFAZOLIN SODIUM 2 G/50ML
2000 SOLUTION INTRAVENOUS ONCE
OUTPATIENT
Start: 2024-04-22 | End: 2024-04-22

## 2024-04-22 RX ORDER — MULTIVIT-MIN/IRON FUM/FOLIC AC 7.5 MG-4
1 TABLET ORAL DAILY
Qty: 30 TABLET | Refills: 1 | Status: SHIPPED | OUTPATIENT
Start: 2024-04-22

## 2024-04-22 RX ORDER — SODIUM CHLORIDE, SODIUM LACTATE, POTASSIUM CHLORIDE, CALCIUM CHLORIDE 600; 310; 30; 20 MG/100ML; MG/100ML; MG/100ML; MG/100ML
125 INJECTION, SOLUTION INTRAVENOUS CONTINUOUS
OUTPATIENT
Start: 2024-04-22

## 2024-04-22 NOTE — PROGRESS NOTES
Knee Follow up Office Note    Assessment:     1. Post-traumatic osteoarthritis of right knee    2. Closed fracture of medial portion of right tibial plateau with malunion, subsequent encounter    3. 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  He has a severe collapse of his tibial plateau ORIF that has resulted in posttraumatic OA.  An MRI of the right knee was reviewed today showing no signs of osteomyelitis. Right knee aspirated for 45ccs of blood tinged synovial fluid at last visit and sent for synovasure testing which also came back negative for infection.  We discussed that there is still a risk that there is underlying infection from previous surgeries or that he develops an infection in the future.  There is also increased risk due to his history of popliteal vascular graft.      Due to his significant bone loss and difficulties with varus instability he is a candidate for complex TKA vs arthrodesis. He would like to avoid fusion if possible.  We discussed the treatment option of V/V constrained/hinged TKA, including the surgical procedure and recovery time after.  The patient has elected to proceed with Right TKA. Risks and benefits of surgery to include but not limited to bleeding, infection, damage to surrounding structures, hardware failure, instability, fracture, dislocation, need for further surgery, continued pain, stiffness, blood clots, stroke, and heart attack was discussed with the patient.  Additional risks discussed including damage to his vascular graft, risk  of infection, loosening due to bone loss and fractrue. We discussed that if he develops and infection there is a high likelihood that this could lead to amputation. He is aware of the risk and would like to proceed as he is unable to ambulate on the leg. Informed consent was signed today in the office. The patient has met with our surgical schedulers and our preoperative joint replacement pathway has been initiated. All questions were answered. Patient will follow-up 2 weeks post operatively. BMI is appropriate at 29.21 and is a nonsmoker.       Subjective:     Patient ID: Aaron Richards is a 59 y.o. male.  Chief Complaint:  HPI:  59 y.o. male who presents today for a followup evaluation of his RIGHT knee.  He has a history of a Right tibial plateau fracture with subsequent ORIF from 5/1/23 with Dr. Hilario.  He has had a nonunion of his right tibial plateau ORIF that has resulted in collapse/severe post traumatic OA.  He then had removal of hardware of this right tibia on 1/22/24 with Dr. Hilario. Since that time he has been having significant pain in the right knee along with swelling of the knee. He has been unable to walk on his knee for several months.  He presents today in a wheelchair.      At last visit, we aspirated his Right knee and sent it for synovasure testing as well as ordered an MRI to eval for possible osteomyelitis.  Since that time he has continued with no wounds or drainage.  He states that the knee is the same as it has been and he has been using his TROM brace and ambulating in a wheelchair.    Allergy:  Allergies   Allergen Reactions    Vancomycin Other (See Comments)     Kidney issues    Pollen Extract Eye Swelling     Eyes get watery      Medications:  all current active meds have been reviewed  Past Medical History:  Past Medical History:   Diagnosis Date    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    Chronic kidney disease     Colon polyp     Constipation 03/09/2023    Diabetes  mellitus (HCC)     Disease of thyroid gland     GERD (gastroesophageal reflux disease)     HHS vs DKA 11/16/2018    Hyperlipidemia     Hypertension     Thyroid cancer (HCC)      Past Surgical History:  Past Surgical History:   Procedure Laterality Date    COLONOSCOPY      FOOT AMPUTATION Right     FOOT SURGERY Right 2014    IR AORTAGRAM WITH RUN-OFF  08/06/2020    IR TUNNELED CENTRAL LINE PLACEMENT  09/08/2020    IR TUNNELED CENTRAL LINE REMOVAL  09/28/2020    [place] AMPUTATION FOOT TRANSMETARSAL Right 09/03/2020    Procedure: AMPUTATION TRANSMETATARSAL (TMA);  Surgeon: Tracy Murillo DPM;  [facility];  Service: Podiatry    NY OPEN TX TIBIAL FRACTURE PROXIMAL UNICONDYLAR Right 5/1/2023    Procedure: OPEN REDUCTION W/ INTERNAL FIXATION (ORIF) RIGHT TIBIAL PLATEAU NONUNION;  Surgeon: Sesar Hilario MD;  [facility];  Service: Orthopedics    NY REMOVAL IMPLANT DEEP Right 1/22/2024    Procedure: REMOVAL HARDWARE TIBIA;  Surgeon: Sesar Hilario MD;  [facility];  Service: Orthopedics    NY THYROIDECTOMY TOTAL/COMPLETE N/A 02/03/2021    Procedure: TOTAL THYROIDECTOMY WITH NIMS MONITORING;  Surgeon: Sesar Garvey MD;  [facility];  Service: ENT    TOE AMPUTATION Right 08/11/2020    Procedure: AMPUTATION TOE;  Surgeon: Tracy Murillo DPM;  [facility];  Service: Podiatry    US GUIDED THYROID BIOPSY  07/02/2020     Family History:  Family History   Problem Relation Age of Onset    Cancer Mother     Hypertension Father      Social History:  Social History     Substance and Sexual Activity   Alcohol Use Not Currently    Comment: quit 1-2023     Social History     Substance and Sexual Activity   Drug Use Not Currently    Types: Marijuana     Social History     Tobacco Use   Smoking Status Never   Smokeless Tobacco Never           ROS:  General: Per HPI  Skin: Negative, except if noted below  HEENT: Negative  Respiratory: Negative  Cardiovascular: Negative  Gastrointestinal:  "Negative  Urinary: Negative  Vascular: Negative  Musculoskeletal: Positive per HPI   Neurologic: Positive per HPI  Endocrine: Negative    Objective:  BP Readings from Last 1 Encounters:   04/22/24 131/81      Wt Readings from Last 1 Encounters:   04/22/24 109 kg (240 lb)        Respiratory:   non-labored respirations    Lymphatics:  no palpable lymph nodes    Gait:   In wheelchair    Neurologic:   Alert and oriented times 3  Patient with normal sensation except as noted below  Deep tendon reflexes 2+ except as noted in MSK exam    Bilateral Lower Extremity:  RIght Knee:      Inspection:  skin intact, well healed surgical incision.  No active drainage noted.    Overall limb alignment varus    Effusion: massive    ROM 10-90 with pain    Extensor Lag: none    Palpation: diffuse Joint line tenderness to palpation    AP Stability at 90 deg +instability    M/L stability in full extension +instability    M/L stability in midflexion +instability    Motor: 5/5 Q/HS/TA/GS/P    Pulses: 2+ DP / 2+ PT    SILT DP/SP/S/S/TN    Imaging:   MRI:  Severe posttraumatic deformity of the knee with loculated joint effusion containing comminuted fragments and presumed allograft material as described. No MRI evidence of osteomyelitis.         BMI:   Estimated body mass index is 29.21 kg/m² as calculated from the following:    Height as of this encounter: 6' 4\" (1.93 m).    Weight as of this encounter: 109 kg (240 lb).  BSA:   Estimated body surface area is 2.4 meters squared as calculated from the following:    Height as of this encounter: 6' 4\" (1.93 m).    Weight as of this encounter: 109 kg (240 lb).           Scribe Attestation      I,:  Dionne Kuhn PA-C am acting as a scribe while in the presence of the attending physician.:       I,:  Jorge Hendrix DO personally performed the services described in this documentation    as scribed in my presence.:               "

## 2024-05-24 ENCOUNTER — TELEPHONE (OUTPATIENT)
Dept: OBGYN CLINIC | Facility: MEDICAL CENTER | Age: 59
End: 2024-05-24

## 2024-05-24 NOTE — TELEPHONE ENCOUNTER
Spoke with the patient. He asked when he need to complete labs/clearance. Advised that he should see Dr. Prather for MC around the last week of June or first week of July and complete his labs a week before that appointment so results are available for review.

## 2024-05-28 LAB
LEFT EYE DIABETIC RETINOPATHY: POSITIVE
RIGHT EYE DIABETIC RETINOPATHY: POSITIVE

## 2024-05-31 ENCOUNTER — TELEPHONE (OUTPATIENT)
Dept: OBGYN CLINIC | Facility: HOSPITAL | Age: 59
End: 2024-05-31

## 2024-05-31 NOTE — TELEPHONE ENCOUNTER
Preoperative Elective Admission Assessment    Living Situation:    Who does pt live with: spouse  What kind of home: apartment  How do they enter the home: front  How many levels in home: 1   # of steps to enter home: 3  # of steps to second floor: n/a  Are there handrails: Yes  Are there landings: No  Sleeping arrangement: first/entry floor  Where is Bathroom: entry level  Where is the tub or shower: step in bath tub with transfer bench  Dogs or ther pets: 1 dog     First Floor Setup:   Is there a bathroom: Yes  Where would pt sleep: bed     DME: rolling walker and cane  We discussed clearing pathways in the home and making sure there is accessibly to use the walker, for example, removing throw rugs.      Patient's Current Level of Function: Non-ambulatory/Wheelchair bound and ADLs: Requires assistance    Post-op Caregiver: spouse  Caregiver Name and phone number for Inpatient discharge needs: Sherrill  Currently receive any HHC/aides/community supports: No     Post-op Transport: spouse  To/from hospital: spouse  To/from PT 2-3x/week: spouse  Uses community transport now: No     Outpatient Physical Therapy Site:  Site: TBD  pre and post-op appts scheduled? No     Medication Management: self and pillbox  Preferred Pharmacy for Post-op Medications: [facility]   Blood Management Vitamin Regimen: Pt confirms taking as prescribed  Post-op anticoagulant: to be determined by surgical team postoperatively     DC Plan: Pt plans to be discharged home    Barriers to DC identified preoperatively: none identified    BMI: 29.21    Patient Education:  Pt educated on post-op pain, early mobilization (POD0), LOS goals, OP PT goals, and preoperative bathing. Patient educated that our goal is to appropriately discharge patient based off their post-op function while striving to maintain maximal independence. The goal is to discharge patient to home and for them to attend outpatient physical therapy.    Assigned to care team? Yes

## 2024-05-31 NOTE — TELEPHONE ENCOUNTER
Caller: Patient    Doctor: Simeon    Reason for call: Patient has a temporary handicap placard that is going to  at the end of . He would like to know if he could get another one since he is still in a wheelchair. Please call patient if it can be done. Thank you.    Call back#: [phone]

## 2024-05-31 NOTE — TELEPHONE ENCOUNTER
Spoke to patient, he till like it mailed out to him, Confirmed address.   I have it on office filled out awaiting signature.

## 2024-06-03 ENCOUNTER — TELEPHONE (OUTPATIENT)
Dept: INTERNAL MEDICINE CLINIC | Facility: CLINIC | Age: 59
End: 2024-06-03

## 2024-06-04 ENCOUNTER — TELEPHONE (OUTPATIENT)
Age: 59
End: 2024-06-04

## 2024-06-04 NOTE — TELEPHONE ENCOUNTER
Forms are going to be sent for doctor prather to sign for joellen regarding his gluco monitor forms will say attention doctor Prather.

## 2024-06-07 ENCOUNTER — TELEPHONE (OUTPATIENT)
Age: 59
End: 2024-06-07

## 2024-06-07 NOTE — TELEPHONE ENCOUNTER
Lily called looking for forms that were fax to the office for continuation of gluco monitor she is going to refax to office for doctor Crescencio to sign for further questions can the office call [phone].

## 2024-06-11 ENCOUNTER — TELEPHONE (OUTPATIENT)
Age: 59
End: 2024-06-11

## 2024-06-11 NOTE — TELEPHONE ENCOUNTER
med called for for Dexcom G7 and sensor kit can the office fax back form questions with all information also taraselieser has medicare and on insulin automatically will be covered can the office fax the form that was sent [phone]

## 2024-06-13 ENCOUNTER — TELEPHONE (OUTPATIENT)
Age: 59
End: 2024-06-13

## 2024-06-13 NOTE — TELEPHONE ENCOUNTER
There are 4 t/c encounters for the same thing - we have not received paperwork in our office to date, regarding the dexcom.

## 2024-06-13 NOTE — TELEPHONE ENCOUNTER
Just resending same message per facility request. Facility states their still waiting to hear back. SEE BELLOW!  Thank you    Natalie Holliday   Medical Assistant     Telephone Encounter     Signed     Encounter Date: 6/11/2024     Signed         [facility] called for for Dexcom G7 and sensor kit can the office fax back form questions with all information also joellen has medicare and on insulin automatically will be covered can the office fax the form that was sent [phone]            Electronically signed by Natalie Holliday at 6/11/2024  2:45 P

## 2024-06-19 NOTE — TELEPHONE ENCOUNTER
Miguelina from [facility] called, states she wanted to see if office received the order for the continuous glucose monitor for patient.     RN informed Miguelina Bautista from [facility] called 30 minutes ago regarding the paperwork, states she would be faxing it over.     Miguelina verbalized understanding, states she will be re-faxing the orders as well.

## 2024-06-20 ENCOUNTER — TELEPHONE (OUTPATIENT)
Age: 59
End: 2024-06-20

## 2024-06-21 ENCOUNTER — TELEPHONE (OUTPATIENT)
Age: 59
End: 2024-06-21

## 2024-06-21 NOTE — TELEPHONE ENCOUNTER
Another fax arrived for the same request - requesting medical records / doctor visit within the last 6 mo and completed rx -  Ppwrk received several (4x) times - forms in Providers bin -  will be back on Monday the 24th to review and complete    [facility] on Sujata Perez's direct phone @ [phone], notifying for this thing,

## 2024-06-21 NOTE — TELEPHONE ENCOUNTER
Reviewed pictures of his right knee that were sent via mychart, it appears to be infected.  I responded to the patient stating that he needs to go to the ER for aspiration and possible washout and IV abx.

## 2024-06-21 NOTE — TELEPHONE ENCOUNTER
Caller: Dottie - Wife     Doctor: Simeon    Reason for call: See's Dr. Hendrix for knee pain and he is having surgery next month Patient is not feeling well, and she believes he may have either water on his knee or something else going on and would like to speak with someone from the office about what they should do     Call back#: [phone]

## 2024-06-21 NOTE — TELEPHONE ENCOUNTER
"Called pt- he states his knee is very swollen and \"puffed out\". Advised him if the swelling and pain is too much to handle, he should go to the ER. He states he wants to avoid that but will if need be. He is also asking for an abx, asked him to send a picture of his knee through a [employer] message so Dr. Hendrix can see if he would need an abx. Also advised if he does to go to er that they could prescribe abx if need be.   But otherwise he is sched for 7/2"

## 2024-06-23 DIAGNOSIS — E11.51 TYPE 2 DIABETES MELLITUS WITH DIABETIC PERIPHERAL ANGIOPATHY WITHOUT GANGRENE, WITH LONG-TERM CURRENT USE OF INSULIN (HCC): Primary | ICD-10-CM

## 2024-06-23 DIAGNOSIS — Z79.4 TYPE 2 DIABETES MELLITUS WITH DIABETIC PERIPHERAL ANGIOPATHY WITHOUT GANGRENE, WITH LONG-TERM CURRENT USE OF INSULIN (HCC): Primary | ICD-10-CM

## 2024-06-23 DIAGNOSIS — N18.32 STAGE 3B CHRONIC KIDNEY DISEASE (HCC): ICD-10-CM

## 2024-06-23 DIAGNOSIS — E78.5 HYPERLIPIDEMIA, UNSPECIFIED HYPERLIPIDEMIA TYPE: ICD-10-CM

## 2024-06-23 DIAGNOSIS — E03.9 ACQUIRED HYPOTHYROIDISM: ICD-10-CM

## 2024-06-24 ENCOUNTER — TELEPHONE (OUTPATIENT)
Age: 59
End: 2024-06-24

## 2024-06-24 NOTE — TELEPHONE ENCOUNTER
I recommend that he go to the ER as soon as he is able to.  I can let the ER know that he is going to be there tomorrow, but I can't tell them that today because the likelihood is that the same people will not be working there tomorrow.  Also, which ER is he planning on going to?  Thanks!

## 2024-06-24 NOTE — TELEPHONE ENCOUNTER
Caller: patient    Doctor: Simeon    Reason for call: patient states they he received message through [employer] about going to the ER. Patient states that he cannot go today and will go tomorrow. PATIENT is asking that the office notifies the ER that patient is coming sometime tomorrow morning. Patient is requesting a call back to confirm ER was notified    Call back#: [phone]

## 2024-06-25 ENCOUNTER — HOSPITAL ENCOUNTER (EMERGENCY)
Facility: HOSPITAL | Age: 59
Discharge: HOME | End: 2024-06-25
Attending: EMERGENCY MEDICINE
Payer: MEDICARE

## 2024-06-25 VITALS
RESPIRATION RATE: 18 BRPM | OXYGEN SATURATION: 98 % | SYSTOLIC BLOOD PRESSURE: 139 MMHG | DIASTOLIC BLOOD PRESSURE: 78 MMHG | TEMPERATURE: 97.6 F | HEART RATE: 74 BPM

## 2024-06-25 DIAGNOSIS — L08.9 INFECTED SEBACEOUS CYST: Primary | ICD-10-CM

## 2024-06-25 DIAGNOSIS — L72.3 INFECTED SEBACEOUS CYST: Primary | ICD-10-CM

## 2024-06-25 PROCEDURE — 99284 EMERGENCY DEPT VISIT MOD MDM: CPT | Performed by: EMERGENCY MEDICINE

## 2024-06-25 PROCEDURE — 10060 I&D ABSCESS SIMPLE/SINGLE: CPT | Performed by: EMERGENCY MEDICINE

## 2024-06-25 RX ORDER — ACETAMINOPHEN 500 MG
1000 TABLET ORAL EVERY 6 HOURS PRN
COMMUNITY
End: 2024-07-21

## 2024-06-25 RX ORDER — CEPHALEXIN 500 MG/1
500 CAPSULE ORAL EVERY 6 HOURS SCHEDULED
Qty: 40 CAPSULE | Refills: 0 | Status: SHIPPED | OUTPATIENT
Start: 2024-06-25 | End: 2024-07-05

## 2024-06-25 RX ORDER — LIDOCAINE HYDROCHLORIDE 10 MG/ML
10 INJECTION, SOLUTION EPIDURAL; INFILTRATION; INTRACAUDAL; PERINEURAL ONCE
Status: COMPLETED | OUTPATIENT
Start: 2024-06-25 | End: 2024-06-25

## 2024-06-25 RX ORDER — IBUPROFEN 600 MG/1
600 TABLET ORAL EVERY 6 HOURS PRN
COMMUNITY
End: 2024-08-01

## 2024-06-25 RX ORDER — CEPHALEXIN 250 MG/1
500 CAPSULE ORAL ONCE
Status: COMPLETED | OUTPATIENT
Start: 2024-06-25 | End: 2024-06-25

## 2024-06-25 RX ADMIN — CEPHALEXIN 500 MG: 250 CAPSULE ORAL at 09:57

## 2024-06-25 RX ADMIN — LIDOCAINE HYDROCHLORIDE 10 ML: 10 INJECTION, SOLUTION EPIDURAL; INFILTRATION; INTRACAUDAL; PERINEURAL at 09:57

## 2024-06-25 NOTE — ED PROVIDER NOTES
History  Chief Complaint   Patient presents with    Knee Swelling     Right knee swelling x 5 days, hx of surgery 1/24      HPI is a 59-year-old male history of a significant right knee fracture requiring surgery and then apparently had an area of induration along the right side of his right knee which required incision and drainage previously.  Patient presents now with a red swollen area on his right lateral knee consistent with a palpable abscess.  Denies any fever or chills.  Patient apparently called orthopedics and was advised to come to the emergency department to have this area drained.  Patient reports he has had the area previously aspirated with a needle and syringe.  He reports having a MRI at that time which showed no bony infection.  Patient is requesting incision and drainage of the area.  Past medical history of prior knee injury, diabetes, colonic polyps  Family history noncontributory  Social history, non-smoker no history of drug abuse    Prior to Admission Medications   Prescriptions Last Dose Informant Patient Reported? Taking?   BD Pen Needle Ijeoma U/F 32G X 4 MM MISC  Self No No   Sig: Inject under the skin 4 (four) times a day   Blood Glucose Monitoring Suppl (ONE TOUCH ULTRA 2) w/Device KIT  Self No No   Sig: Use 2 (two) times a day   Insulin Glargine Solostar (Lantus SoloStar) 100 UNIT/ML SOPN  Self No No   Sig: INJECT 45 UNITS TWICE A DAY   Multiple Vitamins-Minerals (multivitamin with minerals) tablet   No No   Sig: Take 1 tablet by mouth daily   Vitamin D, Cholecalciferol, 25 MCG (1000 UT) CAPS  Self Yes No   Sig: Take by mouth   ascorbic acid (VITAMIN C) 250 mg tablet  Self Yes No   Sig: Take 250 mg by mouth daily   ascorbic acid (VITAMIN C) 500 MG tablet   No No   Sig: Take 1 tablet (500 mg total) by mouth 2 (two) times a day   aspirin (ECOTRIN) 325 mg EC tablet  Self Yes No   Sig: Take 325 mg by mouth every 6 (six) hours as needed for mild pain   aspirin 81 mg chewable tablet  Self No  No   Sig: Chew 1 tablet (81 mg total) every 12 (twelve) hours   atorvastatin (LIPITOR) 40 mg tablet  Self No No   Sig: TAKE 1 TABLET DAILY WITH DINNER   cholecalciferol 1,000 units tablet   No No   Sig: Take 2 tablets (2,000 Units total) by mouth daily   escitalopram (LEXAPRO) 5 mg tablet  Self No No   Sig: TAKE 1 TABLET DAILY   folic acid (FOLVITE) 1 mg tablet   No No   Sig: Take 1 tablet (1 mg total) by mouth daily   insulin glulisine (Apidra) 100 units/mL injection  Self No No   Sig: Us via sliding scale tid   levothyroxine 175 mcg tablet  Self No No   Sig: TAKE 1 TABLET DAILY EARLY IN THE MORNING   omeprazole (PriLOSEC) 40 MG capsule  Self No No   Sig: TAKE 1 CAPSULE DAILY   Patient taking differently: daily after dinner   oxyCODONE (Roxicodone) 5 immediate release tablet  Self No No   Sig: Take 1 tablet (5 mg total) by mouth every 6 (six) hours as needed for moderate pain Max Daily Amount: 20 mg   Patient not taking: Reported on 3/25/2024   traMADol (Ultram) 50 mg tablet  Self No No   Sig: Take 1 tablet (50 mg total) by mouth every 6 (six) hours as needed for moderate pain   Patient not taking: Reported on 2/20/2024   traMADol (Ultram) 50 mg tablet   No No   Sig: Take 1 tablet (50 mg total) by mouth every 6 (six) hours as needed for moderate pain   verapamil (VERELAN PM) 360 MG 24 hr capsule  Self No No   Sig: TAKE 1 CAPSULE DAILY AT BEDTIME   zolpidem (AMBIEN) 10 mg tablet  Self No No   Sig: Take 1 tablet (10 mg total) by mouth daily at bedtime as needed for sleep      Facility-Administered Medications: None       Past Medical History:   Diagnosis Date    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    Chronic kidney disease     Colon polyp     Constipation 03/09/2023    Diabetes mellitus (HCC)     Disease of thyroid gland     GERD (gastroesophageal reflux disease)     HHS vs DKA 11/16/2018    Hyperlipidemia     Hypertension     Thyroid cancer (HCC)        Past Surgical History:   Procedure Laterality  Date    COLONOSCOPY      FOOT AMPUTATION Right     FOOT SURGERY Right 2014    IR AORTAGRAM WITH RUN-OFF  08/06/2020    IR TUNNELED CENTRAL LINE PLACEMENT  09/08/2020    IR TUNNELED CENTRAL LINE REMOVAL  09/28/2020    AR AMPUTATION FOOT TRANSMETARSAL Right 09/03/2020    Procedure: AMPUTATION TRANSMETATARSAL (TMA);  Surgeon: Tracy Murillo DPM;  Location: [facility];  Service: Podiatry    AR OPEN TX TIBIAL FRACTURE PROXIMAL UNICONDYLAR Right 5/1/2023    Procedure: OPEN REDUCTION W/ INTERNAL FIXATION (ORIF) RIGHT TIBIAL PLATEAU NONUNION;  Surgeon: Sesar Hilario MD;  Location: [facility];  Service: Orthopedics    AR REMOVAL IMPLANT DEEP Right 1/22/2024    Procedure: REMOVAL HARDWARE TIBIA;  Surgeon: Sesar Hilario MD;  Location: [facility];  Service: Orthopedics    AR THYROIDECTOMY TOTAL/COMPLETE N/A 02/03/2021    Procedure: TOTAL THYROIDECTOMY WITH NIMS MONITORING;  Surgeon: Sesar Garvey MD;  Location: [facility];  Service: ENT    TOE AMPUTATION Right 08/11/2020    Procedure: AMPUTATION TOE;  Surgeon: Tracy Murillo DPM;  Location: [facility];  Service: Podiatry    US GUIDED THYROID BIOPSY  07/02/2020       Family History   Problem Relation Age of Onset    Cancer Mother     Hypertension Father      I have reviewed and agree with the history as documented.    E-Cigarette/Vaping    E-Cigarette Use Never User      E-Cigarette/Vaping Substances    Nicotine No     THC No     CBD No     Flavoring No     Other No     Unknown No      Social History     Tobacco Use    Smoking status: Never    Smokeless tobacco: Never   Vaping Use    Vaping status: Never Used   Substance Use Topics    Alcohol use: Not Currently     Comment: quit 1-2023    Drug use: Not Currently     Types: Marijuana       Review of Systems   Constitutional:  Negative for chills and fever.   Large area of redness and induration on his right knee    Physical Exam  Physical Exam  Constitutional:       Appearance: Normal appearance.   Skin:     Comments:  There is a very superficial large area of induration and redness on the patient's right lateral knee, see the contained picture, there is obvious fluctuance in the area consistent with abscess or infected cyst.  On opening the area there was a large amount of sebum produced consistent with a infected sebaceous cyst.  Area was completely drained and a large amount of sebum and pus was produced.  Discussed with the patient advised to leave open and are on antibiotics.  Still neurovascular tendon intact.   Neurological:      Mental Status: He is alert.         Vital Signs  ED Triage Vitals [06/25/24 0933]   Temperature Pulse Respirations Blood Pressure SpO2   97.6 °F (36.4 °C) 74 18 139/78 98 %      Temp Source Heart Rate Source Patient Position - Orthostatic VS BP Location FiO2 (%)   Oral Monitor Sitting Left arm --      Pain Score       --           Vitals:    06/25/24 0933   BP: 139/78   Pulse: 74   Patient Position - Orthostatic VS: Sitting         Visual Acuity      ED Medications  Medications   lidocaine (PF) (XYLOCAINE-MPF) 1 % injection 10 mL (10 mL Infiltration Given 6/25/24 0957)   cephalexin (KEFLEX) capsule 500 mg (500 mg Oral Given 6/25/24 0957)       Diagnostic Studies  Results Reviewed       None                   No orders to display              Procedures  Incision and drain    Date/Time: 6/25/2024 10:00 AM    Performed by: Av Tran MD  Authorized by: Av Tran MD  Universal Protocol:  Consent: Verbal consent obtained.  Patient identity confirmed: verbally with patient    Patient location:  ED  Location:     Type:  Abscess    Location:  Lower extremity    Lower extremity location:  R leg  Pre-procedure details:     Skin preparation:  Betadine  Anesthesia (see MAR for exact dosages):     Anesthesia method:  Local infiltration    Local anesthetic:  Lidocaine 1% w/o epi  Procedure details:     Complexity:  Intermediate    Incision types:  Single straight    Scalpel blade:  11    Approach:   Open    Incision depth:  Subcutaneous    Drainage:  Purulent    Drainage amount:  Copious    Wound treatment:  Wound left open  Post-procedure details:     Patient tolerance of procedure:  Tolerated well, no immediate complications  Comments:      Large amount of sebum was produced with some purulent material consistent with a infected sebaceous cyst.  Marked decrease in the size of the lesion after incision and drainage.           ED Course                               SBIRT 20yo+      Flowsheet Row Most Recent Value   Initial Alcohol Screen: US AUDIT-C     1. How often do you have a drink containing alcohol? 0 Filed at: 06/25/2024 1008   2. How many drinks containing alcohol do you have on a typical day you are drinking?  0 Filed at: 06/25/2024 1008   3a. Male UNDER 65: How often do you have five or more drinks on one occasion? 0 Filed at: 06/25/2024 1008   Audit-C Score 0 Filed at: 06/25/2024 1008   WILLIAM: How many times in the past year have you...    Used an illegal drug or used a prescription medication for non-medical reasons? Never Filed at: 06/25/2024 1008                      Medical Decision Making  Medical decision making 59-year-old male history of prior knee surgery presents emergency department with a large area of induration and redness on his right lateral knee consistent with a abscess or skin infection.  Patient was placed on Keflex.  Area was anesthetized and drained large amount of sebum was produced.  Presentation consistent with infected sebaceous cyst.  Discussed outpatient treatment and follow-up discussed indications to return.    Risk  Prescription drug management.             Disposition  Final diagnoses:   Infected sebaceous cyst - Right knee     Time reflects when diagnosis was documented in both MDM as applicable and the Disposition within this note       Time User Action Codes Description Comment    6/25/2024  9:54 AM Av Tran Add [L72.3,  L08.9] Infected sebaceous cyst      6/25/2024  9:54 AM Av Tran [L72.3,  L08.9] Infected sebaceous cyst Right knee          ED Disposition       ED Disposition   Discharge    Condition   Stable    Date/Time   Tue Jun 25, 2024 0954    Comment   Aaron Richards discharge to home/self care.                   Follow-up Information    None         Discharge Medication List as of 6/25/2024  9:55 AM        START taking these medications    Details   cephalexin (KEFLEX) 500 mg capsule Take 1 capsule (500 mg total) by mouth every 6 (six) hours for 10 days, Starting Tue 6/25/2024, Until Fri 7/5/2024, Normal           CONTINUE these medications which have NOT CHANGED    Details   !! ascorbic acid (VITAMIN C) 250 mg tablet Take 250 mg by mouth daily, Historical Med      !! ascorbic acid (VITAMIN C) 500 MG tablet Take 1 tablet (500 mg total) by mouth 2 (two) times a day, Starting Mon 4/22/2024, Normal      aspirin (ECOTRIN) 325 mg EC tablet Take 325 mg by mouth every 6 (six) hours as needed for mild pain, Historical Med      aspirin 81 mg chewable tablet Chew 1 tablet (81 mg total) every 12 (twelve) hours, Starting Mon 1/22/2024, Until Mon 3/4/2024, Normal      atorvastatin (LIPITOR) 40 mg tablet TAKE 1 TABLET DAILY WITH DINNER, Starting Mon 10/23/2023, Normal      BD Pen Needle Ijeoma U/F 32G X 4 MM MISC Inject under the skin 4 (four) times a day, Starting Mon 3/4/2024, Normal      Blood Glucose Monitoring Suppl (ONE TOUCH ULTRA 2) w/Device KIT Use 2 (two) times a day, Starting Mon 3/20/2023, Normal      cholecalciferol 1,000 units tablet Take 2 tablets (2,000 Units total) by mouth daily, Starting Mon 4/22/2024, Normal      escitalopram (LEXAPRO) 5 mg tablet TAKE 1 TABLET DAILY, Starting Fri 1/26/2024, Normal      folic acid (FOLVITE) 1 mg tablet Take 1 tablet (1 mg total) by mouth daily, Starting Mon 4/22/2024, Normal      Insulin Glargine Solostar (Lantus SoloStar) 100 UNIT/ML SOPN INJECT 45 UNITS TWICE A DAY, Normal      insulin glulisine (Apidra)  100 units/mL injection Us via sliding scale tid, No Print      levothyroxine 175 mcg tablet TAKE 1 TABLET DAILY EARLY IN THE MORNING, Starting Mon 12/4/2023, Normal      Multiple Vitamins-Minerals (multivitamin with minerals) tablet Take 1 tablet by mouth daily, Starting Mon 4/22/2024, Normal      omeprazole (PriLOSEC) 40 MG capsule TAKE 1 CAPSULE DAILY, Normal      oxyCODONE (Roxicodone) 5 immediate release tablet Take 1 tablet (5 mg total) by mouth every 6 (six) hours as needed for moderate pain Max Daily Amount: 20 mg, Starting Tue 2/6/2024, Normal      !! traMADol (Ultram) 50 mg tablet Take 1 tablet (50 mg total) by mouth every 6 (six) hours as needed for moderate pain, Starting Thu 1/11/2024, Normal      !! traMADol (Ultram) 50 mg tablet Take 1 tablet (50 mg total) by mouth every 6 (six) hours as needed for moderate pain, Starting Thu 3/21/2024, Normal      verapamil (VERELAN PM) 360 MG 24 hr capsule TAKE 1 CAPSULE DAILY AT BEDTIME, Normal      Vitamin D, Cholecalciferol, 25 MCG (1000 UT) CAPS Take by mouth, Historical Med      zolpidem (AMBIEN) 10 mg tablet Take 1 tablet (10 mg total) by mouth daily at bedtime as needed for sleep, Starting Mon 11/13/2023, Until Sat 5/11/2024 at 2359, Normal       !! - Potential duplicate medications found. Please discuss with provider.          No discharge procedures on file.    PDMP Review         Value Time User    PDMP Reviewed  Yes 3/21/2024  2:41 PM Aaron Wright PA-C            ED Provider  Electronically Signed by             Av Tran MD  06/25/24 8899     Statement Selected

## 2024-06-25 NOTE — TELEPHONE ENCOUNTER
Spoke with the patient, he is scheduled to come into the office on 7/2.  I told him the importance of coming into this appointment.  I explained that we will need to aspirate the knee to eval for infection within the knee joint as it looks like he has a sinus tract that likely communicates with the joint.  We will discuss his surgical treatment options when he comes into this appointment.

## 2024-06-25 NOTE — DISCHARGE INSTRUCTIONS
Wash in the shower or the tub allow anything that wants to come out to come out  Keflex every 6 hours to fight infection  Return increasing redness fever or any problems

## 2024-06-25 NOTE — PRE-PROCEDURE INSTRUCTIONS
Pre-Surgery Instructions:   Medication Instructions    acetaminophen (TYLENOL) 500 mg tablet Uses PRN- DO NOT take day of surgery Surgeon orders for day of surgery    ascorbic acid (VITAMIN C) 500 MG tablet Hold day of surgery.    aspirin 81 mg chewable tablet Stop taking 7 days prior to surgery.    atorvastatin (LIPITOR) 40 mg tablet Take night before surgery    CALCIUM MAGNESIUM ZINC PO Stop taking 7 days prior to surgery.    cephalexin (KEFLEX) 500 mg capsule Pt started on 6/25- to take times 10 days    cholecalciferol 1,000 units tablet Hold day of surgery.    escitalopram (LEXAPRO) 5 mg tablet Take night before surgery    folic acid (FOLVITE) 1 mg tablet Hold day of surgery.    ibuprofen (MOTRIN) 600 mg tablet Stop taking 7 days prior to surgery.    Insulin Glargine Solostar (Lantus SoloStar) 100 UNIT/ML SOPN Take night before surgery    insulin glulisine (Apidra) 100 units/mL injection Hold day of surgery.    levothyroxine 175 mcg tablet Take day of surgery.    Multiple Vitamins-Minerals (multivitamin with minerals) tablet Hold day of surgery.    omeprazole (PriLOSEC) 40 MG capsule Take day of surgery.    verapamil (VERELAN PM) 360 MG 24 hr capsule Take night before surgery    zolpidem (AMBIEN) 10 mg tablet Okay to take the night before surgery if needed.   Medication instructions for day surgery reviewed. Please use only a sip of water to take your instructed medications. Avoid all over the counter vitamins, supplements and NSAIDS for one week prior to surgery per anesthesia guidelines. Tylenol is ok to take as needed.     You will receive a call one business day prior to surgery with an arrival time and hospital directions. If your surgery is scheduled on a Monday, the hospital will be calling you on the Friday prior to your surgery. If you have not heard from anyone by 8pm, please call the hospital supervisor through the hospital  at [phone]. ([place] [phone] or [place]  [phone]).    Do not eat or drink anything after midnight the night before your surgery, including candy, mints, lifesavers, or chewing gum. Do not drink alcohol 24hrs before your surgery. Try not to smoke at least 24hrs before your surgery.       Follow the pre surgery showering instructions as listed in the “My Surgical Experience Booklet” or otherwise provided by your surgeon's office. Do not use a blade to shave the surgical area 1 week before surgery. It is okay to use a clean electric clippers up to 24 hours before surgery. Do not apply any lotions, creams, including makeup, cologne, deodorant, or perfumes after showering on the day of your surgery. Do not use dry shampoo, hair spray, hair gel, or any type of hair products.     No contact lenses, eye make-up, or artificial eyelashes. Remove nail polish, including gel polish, and any artificial, gel, or acrylic nails if possible. Remove all jewelry including rings and body piercing jewelry.     Wear causal clothing that is easy to take on and off. Consider your type of surgery.    Keep any valuables, jewelry, piercings at home. Please bring any specially ordered equipment (sling, braces) if indicated.    Arrange for a responsible person to drive you to and from the hospital on the day of your surgery. Please confirm the visitor policy for the day of your procedure when you receive your phone call with an arrival time.     Call the surgeon's office with any new illnesses, exposures, or additional questions prior to surgery.    Please reference your “My Surgical Experience Booklet” for additional information to prepare for your upcoming surgery.

## 2024-06-25 NOTE — ED NOTES
Dressing and ace bandage applied to R knee per Provider Chelsea order.      Alice Nix RN  06/25/24 1572

## 2024-06-26 ENCOUNTER — VBI (OUTPATIENT)
Dept: FAMILY MEDICINE CLINIC | Facility: CLINIC | Age: 59
End: 2024-06-26

## 2024-06-26 DIAGNOSIS — S82.131P: ICD-10-CM

## 2024-06-26 DIAGNOSIS — M25.561 CHRONIC PAIN OF RIGHT KNEE: ICD-10-CM

## 2024-06-26 DIAGNOSIS — Z71.89 COMPLEX CARE COORDINATION: Primary | ICD-10-CM

## 2024-06-26 DIAGNOSIS — G89.29 CHRONIC PAIN OF RIGHT KNEE: ICD-10-CM

## 2024-06-26 DIAGNOSIS — M17.31 POST-TRAUMATIC OSTEOARTHRITIS OF RIGHT KNEE: ICD-10-CM

## 2024-06-26 RX ORDER — FOLIC ACID 1 MG/1
1000 TABLET ORAL DAILY
Qty: 30 TABLET | Refills: 5 | Status: SHIPPED | OUTPATIENT
Start: 2024-06-26

## 2024-06-26 RX ORDER — MELATONIN
2000 DAILY
Qty: 60 TABLET | Refills: 3 | Status: SHIPPED | OUTPATIENT
Start: 2024-06-26

## 2024-06-26 NOTE — TELEPHONE ENCOUNTER
06/26/24 1:30 PM    Patient contacted post ED visit, [facility] department spoke with patient/caregiver and outreach was successful.    Thank you.  Felisa Tompkins MA  [facility]

## 2024-06-27 ENCOUNTER — PATIENT OUTREACH (OUTPATIENT)
Dept: CASE MANAGEMENT | Facility: OTHER | Age: 59
End: 2024-06-27

## 2024-06-27 NOTE — PROGRESS NOTES
Outpatient Care Management Note.  Outreach call placed to Mr. Richards.  Introduced myself and my role.     Current Symptoms:  Mr. Richards states he continues to have a small amount of drainage from the site of the sebaceous cyst.     Medications Obtained:  Mr. Richards picked up prescribed antibiotics from the pharmacy and is taking them as instructed. Does not feel a medication review is needed.     Follow up Appointments:  Mr. Richards has a follow up appointment with orthopedics on 7/2/2024.    Transportation Issues:  Denies any issues with transportation.     Any Assistance Required: Mr. Richards is independent.  His wife is currently cleaning and re-dressing his wound.  No other needs at this time.

## 2024-07-01 ENCOUNTER — APPOINTMENT (OUTPATIENT)
Dept: LAB | Facility: CLINIC | Age: 59
End: 2024-07-01
Payer: MEDICARE

## 2024-07-01 DIAGNOSIS — Z79.4 TYPE 2 DIABETES MELLITUS WITH DIABETIC PERIPHERAL ANGIOPATHY WITHOUT GANGRENE, WITH LONG-TERM CURRENT USE OF INSULIN (HCC): ICD-10-CM

## 2024-07-01 DIAGNOSIS — S82.131P: ICD-10-CM

## 2024-07-01 DIAGNOSIS — M17.31 POST-TRAUMATIC OSTEOARTHRITIS OF RIGHT KNEE: ICD-10-CM

## 2024-07-01 DIAGNOSIS — E11.51 TYPE 2 DIABETES MELLITUS WITH DIABETIC PERIPHERAL ANGIOPATHY WITHOUT GANGRENE, WITH LONG-TERM CURRENT USE OF INSULIN (HCC): ICD-10-CM

## 2024-07-01 DIAGNOSIS — E78.5 HYPERLIPIDEMIA, UNSPECIFIED HYPERLIPIDEMIA TYPE: ICD-10-CM

## 2024-07-01 DIAGNOSIS — G89.29 CHRONIC PAIN OF RIGHT KNEE: ICD-10-CM

## 2024-07-01 DIAGNOSIS — E03.9 ACQUIRED HYPOTHYROIDISM: ICD-10-CM

## 2024-07-01 DIAGNOSIS — M25.561 CHRONIC PAIN OF RIGHT KNEE: ICD-10-CM

## 2024-07-01 DIAGNOSIS — M25.59 PAIN IN OTHER SPECIFIED JOINT: ICD-10-CM

## 2024-07-01 LAB
ALBUMIN SERPL BCG-MCNC: 3.7 G/DL (ref 3.5–5)
ALP SERPL-CCNC: 112 U/L (ref 34–104)
ALT SERPL W P-5'-P-CCNC: 34 U/L (ref 7–52)
ANION GAP SERPL CALCULATED.3IONS-SCNC: 11 MMOL/L (ref 4–13)
APTT PPP: 30 SECONDS (ref 23–37)
AST SERPL W P-5'-P-CCNC: 31 U/L (ref 13–39)
BASOPHILS # BLD AUTO: 0.06 THOUSANDS/ÂΜL (ref 0–0.1)
BASOPHILS NFR BLD AUTO: 1 % (ref 0–1)
BILIRUB SERPL-MCNC: 0.52 MG/DL (ref 0.2–1)
BUN SERPL-MCNC: 28 MG/DL (ref 5–25)
CALCIUM SERPL-MCNC: 9.3 MG/DL (ref 8.4–10.2)
CHLORIDE SERPL-SCNC: 105 MMOL/L (ref 96–108)
CHOLEST SERPL-MCNC: 109 MG/DL
CO2 SERPL-SCNC: 23 MMOL/L (ref 21–32)
CREAT SERPL-MCNC: 0.83 MG/DL (ref 0.6–1.3)
EOSINOPHIL # BLD AUTO: 0.23 THOUSAND/ÂΜL (ref 0–0.61)
EOSINOPHIL NFR BLD AUTO: 2 % (ref 0–6)
ERYTHROCYTE [DISTWIDTH] IN BLOOD BY AUTOMATED COUNT: 13.6 % (ref 11.6–15.1)
EST. AVERAGE GLUCOSE BLD GHB EST-MCNC: 131 MG/DL
GFR SERPL CREATININE-BSD FRML MDRD: 94 ML/MIN/1.73SQ M
GLUCOSE P FAST SERPL-MCNC: 79 MG/DL (ref 65–99)
HBA1C MFR BLD: 6.2 %
HCT VFR BLD AUTO: 42.4 % (ref 36.5–49.3)
HDLC SERPL-MCNC: 33 MG/DL
HGB BLD-MCNC: 13.9 G/DL (ref 12–17)
IMM GRANULOCYTES # BLD AUTO: 0.03 THOUSAND/UL (ref 0–0.2)
IMM GRANULOCYTES NFR BLD AUTO: 0 % (ref 0–2)
INR PPP: 1.08 (ref 0.84–1.19)
LDLC SERPL CALC-MCNC: 56 MG/DL (ref 0–100)
LYMPHOCYTES # BLD AUTO: 2.32 THOUSANDS/ÂΜL (ref 0.6–4.47)
LYMPHOCYTES NFR BLD AUTO: 24 % (ref 14–44)
MCH RBC QN AUTO: 29.7 PG (ref 26.8–34.3)
MCHC RBC AUTO-ENTMCNC: 32.8 G/DL (ref 31.4–37.4)
MCV RBC AUTO: 91 FL (ref 82–98)
MONOCYTES # BLD AUTO: 1 THOUSAND/ÂΜL (ref 0.17–1.22)
MONOCYTES NFR BLD AUTO: 10 % (ref 4–12)
NEUTROPHILS # BLD AUTO: 6.14 THOUSANDS/ÂΜL (ref 1.85–7.62)
NEUTS SEG NFR BLD AUTO: 63 % (ref 43–75)
NRBC BLD AUTO-RTO: 0 /100 WBCS
PLATELET # BLD AUTO: 271 THOUSANDS/UL (ref 149–390)
PMV BLD AUTO: 11.9 FL (ref 8.9–12.7)
POTASSIUM SERPL-SCNC: 4.2 MMOL/L (ref 3.5–5.3)
PROT SERPL-MCNC: 7.3 G/DL (ref 6.4–8.4)
PROTHROMBIN TIME: 13.9 SECONDS (ref 11.6–14.5)
RBC # BLD AUTO: 4.68 MILLION/UL (ref 3.88–5.62)
SODIUM SERPL-SCNC: 139 MMOL/L (ref 135–147)
TRIGL SERPL-MCNC: 101 MG/DL
TSH SERPL DL<=0.05 MIU/L-ACNC: 2.46 UIU/ML (ref 0.45–4.5)
WBC # BLD AUTO: 9.78 THOUSAND/UL (ref 4.31–10.16)

## 2024-07-01 PROCEDURE — 84443 ASSAY THYROID STIM HORMONE: CPT

## 2024-07-01 PROCEDURE — 86900 BLOOD TYPING SEROLOGIC ABO: CPT | Performed by: NURSE PRACTITIONER

## 2024-07-01 PROCEDURE — 83036 HEMOGLOBIN GLYCOSYLATED A1C: CPT

## 2024-07-01 PROCEDURE — 86901 BLOOD TYPING SEROLOGIC RH(D): CPT | Performed by: NURSE PRACTITIONER

## 2024-07-01 PROCEDURE — 80061 LIPID PANEL: CPT

## 2024-07-01 PROCEDURE — 82570 ASSAY OF URINE CREATININE: CPT

## 2024-07-01 PROCEDURE — 80053 COMPREHEN METABOLIC PANEL: CPT

## 2024-07-01 PROCEDURE — 85025 COMPLETE CBC W/AUTO DIFF WBC: CPT

## 2024-07-01 PROCEDURE — 86850 RBC ANTIBODY SCREEN: CPT | Performed by: NURSE PRACTITIONER

## 2024-07-01 PROCEDURE — 85610 PROTHROMBIN TIME: CPT

## 2024-07-01 PROCEDURE — 85730 THROMBOPLASTIN TIME PARTIAL: CPT

## 2024-07-01 PROCEDURE — 36415 COLL VENOUS BLD VENIPUNCTURE: CPT

## 2024-07-01 PROCEDURE — 82043 UR ALBUMIN QUANTITATIVE: CPT

## 2024-07-02 ENCOUNTER — LAB REQUISITION (OUTPATIENT)
Dept: LAB | Facility: HOSPITAL | Age: 59
End: 2024-07-02
Payer: MEDICARE

## 2024-07-02 ENCOUNTER — TELEPHONE (OUTPATIENT)
Dept: OBGYN CLINIC | Facility: MEDICAL CENTER | Age: 59
End: 2024-07-02

## 2024-07-02 ENCOUNTER — TELEPHONE (OUTPATIENT)
Age: 59
End: 2024-07-02

## 2024-07-02 ENCOUNTER — OFFICE VISIT (OUTPATIENT)
Dept: OBGYN CLINIC | Facility: MEDICAL CENTER | Age: 59
End: 2024-07-02
Payer: MEDICARE

## 2024-07-02 VITALS
DIASTOLIC BLOOD PRESSURE: 73 MMHG | HEIGHT: 76 IN | SYSTOLIC BLOOD PRESSURE: 119 MMHG | HEART RATE: 77 BPM | BODY MASS INDEX: 29.22 KG/M2 | WEIGHT: 240 LBS

## 2024-07-02 DIAGNOSIS — M25.561 CHRONIC PAIN OF RIGHT KNEE: ICD-10-CM

## 2024-07-02 DIAGNOSIS — M17.31 POST-TRAUMATIC OSTEOARTHRITIS OF RIGHT KNEE: ICD-10-CM

## 2024-07-02 DIAGNOSIS — M17.31 UNILATERAL POST-TRAUMATIC OSTEOARTHRITIS, RIGHT KNEE: ICD-10-CM

## 2024-07-02 DIAGNOSIS — G89.29 CHRONIC PAIN OF RIGHT KNEE: ICD-10-CM

## 2024-07-02 DIAGNOSIS — M00.9 INFECTION OF RIGHT KNEE (HCC): Primary | ICD-10-CM

## 2024-07-02 LAB
ABO GROUP BLD: NORMAL
BLD GP AB SCN SERPL QL: NEGATIVE
CREAT UR-MCNC: 131.2 MG/DL
MICROALBUMIN UR-MCNC: 1113.9 MG/L
MICROALBUMIN/CREAT 24H UR: 849 MG/G CREATININE (ref 0–30)
RH BLD: NEGATIVE
SPECIMEN EXPIRATION DATE: NORMAL

## 2024-07-02 PROCEDURE — 20610 DRAIN/INJ JOINT/BURSA W/O US: CPT | Performed by: STUDENT IN AN ORGANIZED HEALTH CARE EDUCATION/TRAINING PROGRAM

## 2024-07-02 PROCEDURE — 99215 OFFICE O/P EST HI 40 MIN: CPT | Performed by: STUDENT IN AN ORGANIZED HEALTH CARE EDUCATION/TRAINING PROGRAM

## 2024-07-02 NOTE — TELEPHONE ENCOUNTER
Patient called, states he just missed a call from [facility], states he isn't sure what the call was regarding.     RN attempted to warm transfer patient to [facility], phone kept dialing.     Patient states he just got back from an appointment in [place] with Dr. Hendrix. States he has an appointment scheduled with [facility] on 07/10/2024. [facility] can either call him back today or tomorrow.

## 2024-07-02 NOTE — PROGRESS NOTES
Knee Follow up Office Note    Assessment:     1. Infection of right knee (HCC)    2. Post-traumatic osteoarthritis of right knee    3. 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  He has a severe collapse of his tibial plateau ORIF that has resulted in posttraumatic OA.  Previous workup includes an MRI of the right knee showing no signs of osteomyelitis as well as synovasure testing which was negative for infection back in April.  About 1.5 weeks ago he developed what appears to be a sinus tract over the lateral aspect of the knee, which was diagnosed as a sebaceous cyst drained in the ER. The purulent fluid drained in the ED was not sent for culture and he was discharged on keflex.      Aspirated this area again today for 3ccs of bloody fluid and sent for synovasure testing.     There is also increased risk due to his history of popliteal vascular graft.      We discussed today that unfortunately this is not an infected sebaceous cyst but is consistent with infected sinus tract which communicates with the knee joint, we will need to change his procedure to an antibiotic spacer in order to treat the infection in his knee joint. We discussed that his fluid results may be affected by current antibiotic use. We discussed again that he is at a significant increased risk of surgery due to damage to his vascular graft and infection.  Also discussed increased risks including loosening due to bone loss and fracture. We also again discussed that he is at a high risk of future need for above knee amputation due to his complexity. He is aware of  the risk and would like to proceed as he is unable to ambulate on the leg due to pain and instability. Informed consent was updated again today in the office for right knee articulating antibiotic spacer. We discussed that he will require augmentation/extreme antibiotic spacer construct due to his proximal tibial bone loss. He has been getting his blood work and his clearance visit is coming up. All questions were answered. Patient will follow-up 2 weeks post operatively. BMI is appropriate at 29.21 and is a nonsmoker.       Subjective:     Patient ID: Aaron Richards is a 59 y.o. male.  Chief Complaint:  HPI:  59 y.o. male who presents today for a followup evaluation of his RIGHT knee.  He has a history of a Right tibial plateau fracture with subsequent ORIF from 5/1/23 with Dr. Hilario.  He has had a nonunion of his right tibial plateau ORIF that has resulted in collapse/severe post traumatic OA.  He then had removal of hardware of this right tibia on 1/22/24 with Dr. Hilario. Since that time he has been having significant pain in the right knee along with swelling of the knee. He has been unable to walk on his knee for several months.  He presents today in a wheelchair.      At previous visit in April, we aspirated his Right knee and sent it for synovasure testing as well as ordered an MRI to eval for possible osteomyelitis. At that time the fluid was negative for infection and the MRI was not concerning for osteomyelitis.    Interval history: On 6/25 he presented to the ED for large collection of fluid underlying his lateral plateau incision. The ED diagnosed him with an infected sebaceous cyst draining a large amount of puss out of the lateral knee. They put him on antibiotics and did not send any of the purulent fluid for culture. Unfortunately this is not an infected sebaceous cyst as this is within the space of his previous lateral plateau hardware that communicates with his knee.    Allergy:  Allergies    Allergen Reactions    Vancomycin Other (See Comments)     Kidney issues    Pollen Extract Eye Swelling     Eyes get watery      Medications:  all current active meds have been reviewed  Past Medical History:  Past Medical History:   Diagnosis Date    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    Chronic kidney disease     Colon polyp     Constipation 03/09/2023    Diabetes mellitus (HCC)     Disease of thyroid gland     GERD (gastroesophageal reflux disease)     HHS vs DKA 11/16/2018    Hyperlipidemia     Hypertension     Thyroid cancer (HCC)      Past Surgical History:  Past Surgical History:   Procedure Laterality Date    COLONOSCOPY      FOOT AMPUTATION Right     FOOT SURGERY Right 2014    IR AORTAGRAM WITH RUN-OFF  08/06/2020    IR TUNNELED CENTRAL LINE PLACEMENT  09/08/2020    IR TUNNELED CENTRAL LINE REMOVAL  09/28/2020    MD AMPUTATION FOOT TRANSMETARSAL Right 09/03/2020    Procedure: AMPUTATION TRANSMETATARSAL (TMA);  Surgeon: Tracy Murillo DPM;  Location: [facility];  Service: Podiatry    MD OPEN TX TIBIAL FRACTURE PROXIMAL UNICONDYLAR Right 5/1/2023    Procedure: OPEN REDUCTION W/ INTERNAL FIXATION (ORIF) RIGHT TIBIAL PLATEAU NONUNION;  Surgeon: Sesar Hilario MD;  Location: [facility];  Service: Orthopedics    MD REMOVAL IMPLANT DEEP Right 1/22/2024    Procedure: REMOVAL HARDWARE TIBIA;  Surgeon: Sesar Hilario MD;  Location: [facility];  Service: Orthopedics    MD THYROIDECTOMY TOTAL/COMPLETE N/A 02/03/2021    Procedure: TOTAL THYROIDECTOMY WITH NIMS MONITORING;  Surgeon: Sesar Garvey MD;  Location: [facility];  Service: ENT    TOE AMPUTATION Right 08/11/2020    Procedure: AMPUTATION TOE;  Surgeon: Tracy Murillo DPM;  Location: [facility];  Service: Podiatry    US GUIDED THYROID BIOPSY  07/02/2020     Family History:  Family History   Problem Relation Age of Onset    Cancer Mother     Hypertension Father      Social History:  Social History     Substance and Sexual Activity    Alcohol Use Not Currently    Comment: quit 1-2023     Social History     Substance and Sexual Activity   Drug Use Not Currently    Types: Marijuana     Social History     Tobacco Use   Smoking Status Never   Smokeless Tobacco Never           ROS:  General: Per HPI  Skin: Negative, except if noted below  HEENT: Negative  Respiratory: Negative  Cardiovascular: Negative  Gastrointestinal: Negative  Urinary: Negative  Vascular: Negative  Musculoskeletal: Positive per HPI   Neurologic: Positive per HPI  Endocrine: Negative    Objective:  BP Readings from Last 1 Encounters:   07/02/24 119/73      Wt Readings from Last 1 Encounters:   07/02/24 109 kg (240 lb)        Respiratory:   non-labored respirations    Lymphatics:  no palpable lymph nodes    Gait:   In wheelchair    Neurologic:   Alert and oriented times 3  Patient with normal sensation except as noted below  Deep tendon reflexes 2+ except as noted in MSK exam    Bilateral Lower Extremity:  RIght Knee:      Inspection: There is what appears to be a sinus tract over the lateral aspect of his knee over his previous incision with no active drainage, but with palpable fluctuance.    Overall limb alignment varus    Effusion: large    ROM 10-90 with pain    Extensor Lag: none    Palpation: diffuse Joint line tenderness to palpation    AP Stability at 90 deg +instability    M/L stability in full extension +instability    M/L stability in midflexion +instability    Motor: 5/5 Q/HS/TA/GS/P    Pulses: 2+ DP / 2+ PT    SILT DP/SP/S/S/TN       Large joint arthrocentesis  Universal Protocol:  Consent: Verbal consent obtained.  Risks and benefits: risks, benefits and alternatives were discussed  Consent given by: patient  Patient understanding: patient states understanding of the procedure being performed  Supporting Documentation  Indications: pain and diagnostic evaluation   Procedure Details  Location: knee - Knee joint: Right knee sinus tract.  Preparation: Patient was  "prepped and draped in the usual sterile fashion  Needle size: 18 G  Approach: anterolateral    Aspirate amount: 3 mL  Aspirate: serous and blood-tinged  Analysis: fluid sample sent for laboratory analysis  Patient tolerance: patient tolerated the procedure well with no immediate complications  Dressing:  Sterile dressing applied            BMI:   Estimated body mass index is 29.21 kg/m² as calculated from the following:    Height as of this encounter: 6' 4\" (1.93 m).    Weight as of this encounter: 109 kg (240 lb).  BSA:   Estimated body surface area is 2.4 meters squared as calculated from the following:    Height as of this encounter: 6' 4\" (1.93 m).    Weight as of this encounter: 109 kg (240 lb).           Scribe Attestation      I,:  Dionne Kuhn PA-C am acting as a scribe while in the presence of the attending physician.:       I,:  Jorge Hendrix, DO personally performed the services described in this documentation    as scribed in my presence.:               "

## 2024-07-02 NOTE — TELEPHONE ENCOUNTER
Patient currently has an appointment with Dr. Hendrix on 7/30. I called and left a voicemail informing the patient that the provider will be out of the office that day and will need to reschedule. I provided the scheduling line phone number for the patient to call back and reschedule.

## 2024-07-10 ENCOUNTER — CONSULT (OUTPATIENT)
Dept: FAMILY MEDICINE CLINIC | Facility: CLINIC | Age: 59
End: 2024-07-10
Payer: MEDICARE

## 2024-07-10 ENCOUNTER — TELEPHONE (OUTPATIENT)
Dept: ADMINISTRATIVE | Facility: OTHER | Age: 59
End: 2024-07-10

## 2024-07-10 VITALS
BODY MASS INDEX: 29.21 KG/M2 | SYSTOLIC BLOOD PRESSURE: 128 MMHG | HEIGHT: 76 IN | OXYGEN SATURATION: 98 % | HEART RATE: 69 BPM | DIASTOLIC BLOOD PRESSURE: 80 MMHG

## 2024-07-10 DIAGNOSIS — M25.561 CHRONIC PAIN OF RIGHT KNEE: ICD-10-CM

## 2024-07-10 DIAGNOSIS — Z01.818 PRE-OP EXAMINATION: ICD-10-CM

## 2024-07-10 DIAGNOSIS — M17.31 POST-TRAUMATIC OSTEOARTHRITIS OF RIGHT KNEE: Primary | ICD-10-CM

## 2024-07-10 DIAGNOSIS — G89.29 CHRONIC PAIN OF RIGHT KNEE: ICD-10-CM

## 2024-07-10 PROBLEM — Z79.4 DIABETES MELLITUS TYPE 2, INSULIN DEPENDENT (HCC): Status: ACTIVE | Noted: 2024-07-10

## 2024-07-10 PROBLEM — S82.131A CLOSED FRACTURE OF MEDIAL PLATEAU OF RIGHT TIBIA: Status: RESOLVED | Noted: 2023-04-13 | Resolved: 2024-07-10

## 2024-07-10 PROBLEM — E11.9 DIABETES MELLITUS TYPE 2, INSULIN DEPENDENT (HCC): Status: ACTIVE | Noted: 2024-07-10

## 2024-07-10 PROBLEM — S82.131P: Status: RESOLVED | Noted: 2023-11-30 | Resolved: 2024-07-10

## 2024-07-10 PROCEDURE — 99214 OFFICE O/P EST MOD 30 MIN: CPT | Performed by: PHYSICIAN ASSISTANT

## 2024-07-10 PROCEDURE — 93000 ELECTROCARDIOGRAM COMPLETE: CPT | Performed by: PHYSICIAN ASSISTANT

## 2024-07-10 NOTE — LETTER
Diabetic Eye Exam Form    Date Requested: 24  Patient: Aaron Richards  Patient : [id number]   Referring Provider: Oswaldo Prather MD      DIABETIC Eye Exam Date _______________________________      Type of Exam MUST be documented for Diabetic Eye Exams. Please CHECK ONE.     Retinal Exam       Dilated Retinal Exam       OCT       Optomap-Iris Exam      Fundus Photography       Left Eye - Please check Retinopathy or No Retinopathy        Exam did show retinopathy    Exam did not show retinopathy       Right Eye - Please check Retinopathy or No Retinopathy       Exam did show retinopathy    Exam did not show retinopathy       Comments __________________________________________________________    Practice Providing Exam ______________________________________________    Exam Performed By (print name) _______________________________________      Provider Signature ___________________________________________________      These reports are needed for  compliance.  Please fax this completed form and a copy of the Diabetic Eye Exam report to our office located at [address] [place] [address] as soon as possible via Fax [phone] jhoana Soto: Phone [phone]  We thank you for your assistance in treating our mutual patient.

## 2024-07-10 NOTE — TELEPHONE ENCOUNTER
----- Message from Aminta DEAN sent at 7/10/2024  9:39 AM EDT -----  Regarding: Care Gap Request  07/10/24 9:39 AM    Hello, our patient listed above has had Diabetic Eye Exam completed/performed. Please assist in updating the patient chart by making an External outreach to [facility] facility located The date of service is 2-3 weeks ago     Thank you,  Aminta MARINO [address] [place] [address]

## 2024-07-10 NOTE — PROGRESS NOTES
Subjective:     Aaron Richards is a 59 y.o. male who presents to the office today for a preoperative consultation at the request of surgeon Jorge Hendrix DO who plans on performing right total knee arthroplasty on July 15. This consultation is requested for the specific conditions prompting preoperative evaluation (i.e. because of potential affect on operative risk): DM, HTN, kidney disease . Planned anesthesia: general. The patient has the following known anesthesia issues:  none . Patients bleeding risk: no recent abnormal bleeding. Patient does not have objections to receiving blood products if needed.    The following portions of the patient's history were reviewed and updated as appropriate: He  has a past medical history of Broken internal right knee prosthesis ([facility]) (01/2023), Chronic kidney disease, Colon polyp, Constipation (03/09/2023), Diabetes mellitus ([facility]), Disease of thyroid gland, GERD (gastroesophageal reflux disease), HHS vs DKA (11/16/2018), Hyperlipidemia, Hypertension, and Thyroid cancer ([facility]).  He   Patient Active Problem List    Diagnosis Date Noted   • Chronic pain of right knee 07/10/2024   • Post-traumatic osteoarthritis of right knee 02/06/2024   • Pre-op examination 01/18/2024   • S/P hardware removal 06/20/2023   • MANNY (obstructive sleep apnea) 05/01/2023   • Constipation 03/09/2023   • Depression and anxiety 03/03/2023   • Acquired genu varum of right lower extremity 02/21/2023   • MARKELL (acute kidney injury) ([facility]) 02/14/2023   • Chronic kidney disease-mineral and bone disorder 02/14/2023   • Closed fracture of right tibial plateau 02/07/2023   • Stage 3b chronic kidney disease ([facility]) 11/15/2022   • Hyperkalemia 11/15/2022   • Alcoholism ([facility]) 11/15/2022   • Dupuytren's contracture of right hand 06/07/2022   • Type 2 diabetes mellitus with diabetic peripheral angiopathy without gangrene ([facility]) 02/07/2022   • Acquired hypothyroidism 10/18/2021   • Acquired absence of right foot ([facility])  03/29/2021   • Persistent proteinuria 01/12/2021   • Abnormal EKG 12/03/2020   • Insomnia 12/03/2020   • Urinary retention 09/08/2020   • Thyroid cancer ([facility]) 08/20/2020   • PAD (peripheral artery disease) ([facility]) 08/05/2020   • Elevated liver enzymes 10/24/2019   • Colon polyps 09/12/2019   • Right-sided tinnitus 11/26/2018   • HHS vs DKA 11/16/2018   • Hypertension, essential 09/24/2018   • Gastroesophageal reflux disease without esophagitis 09/24/2018   • Hyperlipidemia 12/17/2009   • History of nonadherence to medical treatment 02/20/2008     He  has a past surgical history that includes Foot surgery (Right, 2014); US guided thyroid biopsy (07/02/2020); IR aortagram with run-off (08/06/2020); Toe amputation (Right, 08/11/2020); pr amputation foot transmetarsal (Right, 09/03/2020); IR tunneled central line placement (09/08/2020); IR tunneled central line removal (09/28/2020); pr thyroidectomy total/complete (N/A, 02/03/2021); Colonoscopy; Foot Amputation (Right); pr open tx tibial fracture proximal unicondylar (Right, 5/1/2023); and pr removal implant deep (Right, 1/22/2024).  His family history includes Cancer in his mother; Hypertension in his father.  He  reports that he has never smoked. He has never used smokeless tobacco. He reports that he does not currently use alcohol. He reports that he does not currently use drugs after having used the following drugs: Marijuana.  Current Outpatient Medications   Medication Sig Dispense Refill   • acetaminophen (TYLENOL) 500 mg tablet Take 1,000 mg by mouth every 6 (six) hours as needed for mild pain     • ascorbic acid (VITAMIN C) 500 MG tablet Take 1 tablet (500 mg total) by mouth 2 (two) times a day 60 tablet 1   • atorvastatin (LIPITOR) 40 mg tablet TAKE 1 TABLET DAILY WITH DINNER 90 tablet 3   • BD Pen Needle Ijeoma U/F 32G X 4 MM MISC Inject under the skin 4 (four) times a day 400 each 0   • Blood Glucose Monitoring Suppl (ONE TOUCH ULTRA 2) w/Device KIT Use 2 (two)  times a day 1 kit 0   • CALCIUM MAGNESIUM ZINC PO Take 1 tablet by mouth in the morning     • cholecalciferol (VITAMIN D3) 1,000 units tablet take 2 tablets by mouth once daily 60 tablet 3   • escitalopram (LEXAPRO) 5 mg tablet TAKE 1 TABLET DAILY 60 tablet 5   • folic acid (FOLVITE) 1 mg tablet take 1 tablet by mouth once daily 30 tablet 5   • ibuprofen (MOTRIN) 600 mg tablet Take 600 mg by mouth every 6 (six) hours as needed for mild pain     • Insulin Glargine Solostar (Lantus SoloStar) 100 UNIT/ML SOPN INJECT 45 UNITS TWICE A DAY (Patient taking differently: Inject 45 Units under the skin 2 (two) times a day INJECT 45 UNITS TWICE A DAY) 225 mL 3   • insulin glulisine (Apidra) 100 units/mL injection Us via sliding scale tid 60 mL 0   • levothyroxine 175 mcg tablet TAKE 1 TABLET DAILY EARLY IN THE MORNING 60 tablet 5   • Multiple Vitamins-Minerals (multivitamin with minerals) tablet Take 1 tablet by mouth daily 30 tablet 1   • omeprazole (PriLOSEC) 40 MG capsule TAKE 1 CAPSULE DAILY (Patient taking differently: Take 40 mg by mouth every other day) 90 capsule 3   • verapamil (VERELAN PM) 360 MG 24 hr capsule TAKE 1 CAPSULE DAILY AT BEDTIME (Patient taking differently: Take 360 mg by mouth daily at bedtime) 90 capsule 3   • ascorbic acid (VITAMIN C) 250 mg tablet Take 250 mg by mouth daily (Patient not taking: Reported on 6/25/2024)     • aspirin (ECOTRIN) 325 mg EC tablet Take 325 mg by mouth every 6 (six) hours as needed for mild pain (Patient not taking: Reported on 6/25/2024)     • aspirin 81 mg chewable tablet Chew 1 tablet (81 mg total) every 12 (twelve) hours (Patient taking differently: Chew 81 mg once) 84 tablet 0   • oxyCODONE (Roxicodone) 5 immediate release tablet Take 1 tablet (5 mg total) by mouth every 6 (six) hours as needed for moderate pain Max Daily Amount: 20 mg (Patient not taking: Reported on 3/25/2024) 30 tablet 0   • traMADol (Ultram) 50 mg tablet Take 1 tablet (50 mg total) by mouth every 6  (six) hours as needed for moderate pain (Patient not taking: Reported on 2/20/2024) 40 tablet 0   • traMADol (Ultram) 50 mg tablet Take 1 tablet (50 mg total) by mouth every 6 (six) hours as needed for moderate pain (Patient not taking: Reported on 6/25/2024) 30 tablet 0   • Vitamin D, Cholecalciferol, 25 MCG (1000 UT) CAPS Take by mouth (Patient not taking: Reported on 6/25/2024)     • zolpidem (AMBIEN) 10 mg tablet Take 1 tablet (10 mg total) by mouth daily at bedtime as needed for sleep 90 tablet 0     No current facility-administered medications for this visit.     Current Outpatient Medications on File Prior to Visit   Medication Sig   • acetaminophen (TYLENOL) 500 mg tablet Take 1,000 mg by mouth every 6 (six) hours as needed for mild pain   • ascorbic acid (VITAMIN C) 500 MG tablet Take 1 tablet (500 mg total) by mouth 2 (two) times a day   • atorvastatin (LIPITOR) 40 mg tablet TAKE 1 TABLET DAILY WITH DINNER   • BD Pen Needle Ijeoma U/F 32G X 4 MM MISC Inject under the skin 4 (four) times a day   • Blood Glucose Monitoring Suppl (ONE TOUCH ULTRA 2) w/Device KIT Use 2 (two) times a day   • CALCIUM MAGNESIUM ZINC PO Take 1 tablet by mouth in the morning   • cholecalciferol (VITAMIN D3) 1,000 units tablet take 2 tablets by mouth once daily   • escitalopram (LEXAPRO) 5 mg tablet TAKE 1 TABLET DAILY   • folic acid (FOLVITE) 1 mg tablet take 1 tablet by mouth once daily   • ibuprofen (MOTRIN) 600 mg tablet Take 600 mg by mouth every 6 (six) hours as needed for mild pain   • Insulin Glargine Solostar (Lantus SoloStar) 100 UNIT/ML SOPN INJECT 45 UNITS TWICE A DAY (Patient taking differently: Inject 45 Units under the skin 2 (two) times a day INJECT 45 UNITS TWICE A DAY)   • insulin glulisine (Apidra) 100 units/mL injection Us via sliding scale tid   • levothyroxine 175 mcg tablet TAKE 1 TABLET DAILY EARLY IN THE MORNING   • Multiple Vitamins-Minerals (multivitamin with minerals) tablet Take 1 tablet by mouth daily   •  "omeprazole (PriLOSEC) 40 MG capsule TAKE 1 CAPSULE DAILY (Patient taking differently: Take 40 mg by mouth every other day)   • verapamil (VERELAN PM) 360 MG 24 hr capsule TAKE 1 CAPSULE DAILY AT BEDTIME (Patient taking differently: Take 360 mg by mouth daily at bedtime)   • ascorbic acid (VITAMIN C) 250 mg tablet Take 250 mg by mouth daily (Patient not taking: Reported on 6/25/2024)   • aspirin (ECOTRIN) 325 mg EC tablet Take 325 mg by mouth every 6 (six) hours as needed for mild pain (Patient not taking: Reported on 6/25/2024)   • aspirin 81 mg chewable tablet Chew 1 tablet (81 mg total) every 12 (twelve) hours (Patient taking differently: Chew 81 mg once)   • oxyCODONE (Roxicodone) 5 immediate release tablet Take 1 tablet (5 mg total) by mouth every 6 (six) hours as needed for moderate pain Max Daily Amount: 20 mg (Patient not taking: Reported on 3/25/2024)   • traMADol (Ultram) 50 mg tablet Take 1 tablet (50 mg total) by mouth every 6 (six) hours as needed for moderate pain (Patient not taking: Reported on 2/20/2024)   • traMADol (Ultram) 50 mg tablet Take 1 tablet (50 mg total) by mouth every 6 (six) hours as needed for moderate pain (Patient not taking: Reported on 6/25/2024)   • Vitamin D, Cholecalciferol, 25 MCG (1000 UT) CAPS Take by mouth (Patient not taking: Reported on 6/25/2024)   • zolpidem (AMBIEN) 10 mg tablet Take 1 tablet (10 mg total) by mouth daily at bedtime as needed for sleep     No current facility-administered medications on file prior to visit.     He is allergic to vancomycin and pollen extract..    Review of Systems  Pertinent items are noted in HPI.     Objective:     /80 (BP Location: Left arm, Patient Position: Sitting, Cuff Size: Standard)   Pulse 69   Ht 6' 4\" (1.93 m)   SpO2 98%   BMI 29.21 kg/m²   General appearance: alert and oriented, in no acute distress  Head: Normocephalic, without obvious abnormality, atraumatic  Eyes: negative  Ears: normal TM's and external ear " canals both ears  Nose: Nares normal. Septum midline. Mucosa normal. No drainage or sinus tenderness.  Throat: normal findings: lips normal without lesions, buccal mucosa normal, palate normal, tongue midline and normal, soft palate, uvula, and tonsils normal, and oropharynx pink & moist without lesions or evidence of thrush  Neck: no adenopathy, no carotid bruit, no JVD, supple, symmetrical, trachea midline, and thyroid not enlarged, symmetric, no tenderness/mass/nodules  Back: symmetric, no curvature. ROM normal. No CVA tenderness.  Lungs: clear to auscultation bilaterally  Chest wall: no tenderness  Heart: regular rate and rhythm, S1, S2 normal, no murmur, click, rub or gallop  Abdomen: soft, non-tender; bowel sounds normal; no masses,  no organomegaly  Extremities: edema right lower leg  Pulses:  decreased in lower extremities  Skin:  healing wound right lateral knee  Lymph nodes: Cervical adenopathy: not palpable  Neurologic: Mental status: Alert, oriented, thought content appropriate    Predictors of intubation difficulty:   Morbid obesity? no   Anatomically abnormal facies? no   Prominent incisors? no   Receding mandible? no   Short, thick neck? no   Neck range of motion: normal   Mallampati score: II (hard and soft palate, upper portion of tonsils anduvula visible)   Dentition: Missing teeth (upper and lower )    Cardiographics  ECG:  normal sinus rhythm, Left axis deviation which has been present on previous ECGs   Echocardiogram: not done    Imaging  Chest x-ray:  not done      Lab Review   Lab Requisition on 07/01/2024   Component Date Value   • ABO Grouping 07/01/2024 A    • Rh Factor 07/01/2024 Negative    • Antibody Screen 07/01/2024 Negative    • Specimen Expiration Date 07/01/2024 [id number]    Appointment on 07/01/2024   Component Date Value   • Sodium 07/01/2024 139    • Potassium 07/01/2024 4.2    • Chloride 07/01/2024 105    • CO2 07/01/2024 23    • ANION GAP 07/01/2024 11    • BUN 07/01/2024 28  (H)    • Creatinine 07/01/2024 0.83    • Glucose, Fasting 07/01/2024 79    • Calcium 07/01/2024 9.3    • AST 07/01/2024 31    • ALT 07/01/2024 34    • Alkaline Phosphatase 07/01/2024 112 (H)    • Total Protein 07/01/2024 7.3    • Albumin 07/01/2024 3.7    • Total Bilirubin 07/01/2024 0.52    • eGFR 07/01/2024 94    • WBC 07/01/2024 9.78    • RBC 07/01/2024 4.68    • Hemoglobin 07/01/2024 13.9    • Hematocrit 07/01/2024 42.4    • MCV 07/01/2024 91    • MCH 07/01/2024 29.7    • MCHC 07/01/2024 32.8    • RDW 07/01/2024 13.6    • MPV 07/01/2024 11.9    • Platelets 07/01/2024 271    • nRBC 07/01/2024 0    • Segmented % 07/01/2024 63    • Immature Grans % 07/01/2024 0    • Lymphocytes % 07/01/2024 24    • Monocytes % 07/01/2024 10    • Eosinophils Relative 07/01/2024 2    • Basophils Relative 07/01/2024 1    • Absolute Neutrophils 07/01/2024 6.14    • Absolute Immature Grans 07/01/2024 0.03    • Absolute Lymphocytes 07/01/2024 2.32    • Absolute Monocytes 07/01/2024 1.00    • Eosinophils Absolute 07/01/2024 0.23    • Basophils Absolute 07/01/2024 0.06    • Protime 07/01/2024 13.9    • INR 07/01/2024 1.08    • PTT 07/01/2024 30    • Creatinine, Ur 07/01/2024 131.2    • Albumin,U,Random 07/01/2024 1,113.9 (H)    • Albumin Creat Ratio 07/01/2024 849 (H)    • Cholesterol 07/01/2024 109    • Triglycerides 07/01/2024 101    • HDL, Direct 07/01/2024 33 (L)    • LDL Calculated 07/01/2024 56    • TSH 3RD GENERATON 07/01/2024 2.464    • Hemoglobin A1C 07/01/2024 6.2 (H)    • EAG 07/01/2024 131         Assessment:     59 y.o. male with planned surgery as above.    Known risk factors for perioperative complications: Diabetes mellitus  Renal dysfunction    Difficulty with intubation is not anticipated.    Cardiac Risk Estimation: MODERATE RISK, CLEARED FOR SURGERY    Current medications which may produce withdrawal symptoms if withheld perioperatively: none     Plan:     1. Preoperative workup as follows none.  2. Change in medication  regimen before surgery:  as per anesthesia .  3. Prophylaxis for cardiac events with perioperative beta-blockers: not indicated.  4. Invasive hemodynamic monitoring perioperatively: not indicated.  5. Deep vein thrombosis prophylaxis postoperatively:regimen to be chosen by surgical team.  6. Surveillance for postoperative MI with ECG immediately postoperatively and on postoperative days 1 and 2 AND troponin levels 24 hours postoperatively and on day 4 or hospital discharge (whichever comes first): not indicated.  7. Other measures:  none

## 2024-07-10 NOTE — LETTER
Diabetic Eye Exam Form    Date Requested: 24  Patient: Aaron Richards  Patient : 1965   Referring Provider: Oswaldo Prather MD      DIABETIC Eye Exam Date _______________________________      Type of Exam MUST be documented for Diabetic Eye Exams. Please CHECK ONE.     Retinal Exam       Dilated Retinal Exam       OCT       Optomap-Iris Exam      Fundus Photography       Left Eye - Please check Retinopathy or No Retinopathy        Exam did show retinopathy    Exam did not show retinopathy       Right Eye - Please check Retinopathy or No Retinopathy       Exam did show retinopathy    Exam did not show retinopathy       Comments __________________________________________________________    Practice Providing Exam ______________________________________________    Exam Performed By (print name) _______________________________________      Provider Signature ___________________________________________________      These reports are needed for  compliance.  Please fax this completed form and a copy of the Diabetic Eye Exam report to our office located at [address] [place] [address] as soon as possible via Fax [phone] jhoana Soto: Phone [phone]  We thank you for your assistance in treating our mutual patient.

## 2024-07-11 ENCOUNTER — TELEPHONE (OUTPATIENT)
Age: 59
End: 2024-07-11

## 2024-07-11 NOTE — TELEPHONE ENCOUNTER
Angela from pre admin testing said if the patients tracing can be scanned into his chart from yesterday ekg

## 2024-07-12 ENCOUNTER — ANESTHESIA EVENT (OUTPATIENT)
Dept: PERIOP | Facility: HOSPITAL | Age: 59
End: 2024-07-12
Payer: MEDICARE

## 2024-07-12 RX ORDER — DOCUSATE SODIUM 100 MG/1
100 CAPSULE, LIQUID FILLED ORAL 2 TIMES DAILY
Status: CANCELLED | OUTPATIENT
Start: 2024-07-12

## 2024-07-12 RX ORDER — ACETAMINOPHEN 325 MG/1
975 TABLET ORAL EVERY 8 HOURS
Status: CANCELLED | OUTPATIENT
Start: 2024-07-12

## 2024-07-12 RX ORDER — PANTOPRAZOLE SODIUM 40 MG/1
40 TABLET, DELAYED RELEASE ORAL DAILY
Status: CANCELLED | OUTPATIENT
Start: 2024-07-12

## 2024-07-12 RX ORDER — GABAPENTIN 300 MG/1
300 CAPSULE ORAL
Status: CANCELLED | OUTPATIENT
Start: 2024-07-12

## 2024-07-12 RX ORDER — SIMETHICONE 80 MG
80 TABLET,CHEWABLE ORAL 4 TIMES DAILY PRN
Status: CANCELLED | OUTPATIENT
Start: 2024-07-12

## 2024-07-12 RX ORDER — OXYCODONE HYDROCHLORIDE 10 MG/1
10 TABLET ORAL EVERY 4 HOURS PRN
Status: CANCELLED | OUTPATIENT
Start: 2024-07-12

## 2024-07-12 RX ORDER — SENNOSIDES 8.6 MG
1 TABLET ORAL DAILY
Status: CANCELLED | OUTPATIENT
Start: 2024-07-12

## 2024-07-12 RX ORDER — CEFAZOLIN SODIUM 2 G/50ML
2000 SOLUTION INTRAVENOUS EVERY 8 HOURS
Status: CANCELLED | OUTPATIENT
Start: 2024-07-12 | End: 2024-07-13

## 2024-07-12 RX ORDER — MORPHINE SULFATE 10 MG/ML
2 INJECTION, SOLUTION INTRAMUSCULAR; INTRAVENOUS EVERY 2 HOUR PRN
Status: CANCELLED | OUTPATIENT
Start: 2024-07-12 | End: 2024-07-14

## 2024-07-12 RX ORDER — ONDANSETRON 2 MG/ML
4 INJECTION INTRAMUSCULAR; INTRAVENOUS EVERY 6 HOURS PRN
Status: CANCELLED | OUTPATIENT
Start: 2024-07-12

## 2024-07-12 RX ORDER — OXYCODONE HYDROCHLORIDE 5 MG/1
5 TABLET ORAL EVERY 4 HOURS PRN
Status: CANCELLED | OUTPATIENT
Start: 2024-07-12

## 2024-07-12 RX ORDER — ASCORBIC ACID 500 MG
500 TABLET ORAL 2 TIMES DAILY
Status: CANCELLED | OUTPATIENT
Start: 2024-07-12

## 2024-07-12 RX ORDER — SODIUM CHLORIDE, SODIUM LACTATE, POTASSIUM CHLORIDE, CALCIUM CHLORIDE 600; 310; 30; 20 MG/100ML; MG/100ML; MG/100ML; MG/100ML
100 INJECTION, SOLUTION INTRAVENOUS CONTINUOUS
Status: CANCELLED | OUTPATIENT
Start: 2024-07-12

## 2024-07-12 RX ORDER — DOXYCYCLINE 100 MG/1
100 CAPSULE ORAL 2 TIMES DAILY
Qty: 28 CAPSULE | Refills: 0 | Status: CANCELLED | OUTPATIENT
Start: 2024-07-12 | End: 2024-07-26

## 2024-07-12 RX ORDER — ACETAMINOPHEN 325 MG/1
650 TABLET ORAL EVERY 4 HOURS PRN
Status: CANCELLED | OUTPATIENT
Start: 2024-07-12

## 2024-07-12 RX ORDER — FOLIC ACID 1 MG/1
1 TABLET ORAL DAILY
Status: CANCELLED | OUTPATIENT
Start: 2024-07-12

## 2024-07-12 RX ORDER — CALCIUM CARBONATE 500 MG/1
1000 TABLET, CHEWABLE ORAL DAILY PRN
Status: CANCELLED | OUTPATIENT
Start: 2024-07-12

## 2024-07-12 NOTE — DISCHARGE INSTR - AVS FIRST PAGE
FACILITY RN TO REMOVE PICC AFTER FINAL DOSE OF IV ANTIBIOTIC ON 8/26/2024.  Weekly CBCD, creatinine, and CK while on IV antibiotic treatment.  You will be set up with an appointment in the outpatient Infectious Disease office. It is important for you to keep this appointment in order for us to monitor you while you are on IV antibiotics.  This will include evaluating your lab work, examining your PICC line, and making sure you are not having toxicities or side effects of the medication(s) you are receiving.   ------------------------------------------------------------------------------------------------------------            Dr. Hendrix Knee Replacement    What to Expect/Activity  It is normal to have some discomfort in your knee for several days to weeks.  You are weight bearing as tolerated to your operative leg with assist devices.  Please use crutches/walker when ambulating until your follow-up  Swelling and discomfort in the knee is normal for several days after surgery. For the first 2-3 days, use ice around the knee to help. Use for 20-30 minutes every 1-2 hours for 48 hours, while awake. You may continue beyond 48 hours as needed.  Place one or two pillows underneath your calf, not your knee, to reduce swelling.  Physical therapy on your own at home should start as soon as possible (see below). Please perform heel slides and extension exercises on your own as well (see diagram).  Please use incentive spirometer 10 times per hour while awake (see diagram).    Dressing/Wound Care/Bathing  You may remove your toe-to-groin dressing 24 hours after surgery. There will be a surgical dressing over your incision that stays in place until follow-up unless water gets under the bandage and then it should be removed.   You may start showering 24 hours after surgery, the surgical dressing will remain in place. Please pat the dressing dry. If you notice the dressing appears saturated or is starting to come off, please  replace with dry dressing.  You can keep the dressing in place until follow-up in the office.   Do not place any creams, ointments or gels on or around the incision.  No baths, swimming or submerging until cleared by Dr. Hendrix    Pain Management/Medications  You may resume your usual medications.  Please take the following medications:  Anti-coagulation (blood clot prevention) - aspirin 81mg twice daily for 4 weeks  Pain medication:  Narcotic: Take as directed  NSAID/Anti-inflammatory: Take as directed  Tylenol 1000mg every 8 hours  Zofran (ondasetron) - 4mg every 8 hours as needed for nausea  Stool softeners (senna/colace) - take daily to prevent constipation as narcotic pain medication causes constipation  Antibiotic - take as directed if prescribed   If you have questions or pain concerns, please contact the office. Pain medication cannot remove all post-operative pain.    Follow up/Call if:  The findings of your surgery will be explained to you and your family immediately after surgery. However, in the post-operative period, during recovery from anesthesia you may not fully remember or fully understand what was said. This will be again gone over when you return for your post-op appointment.  Please contact Dr. Hendrix's office if you experience the following:  Excessive bleeding (bleeding through your dressing)  Fever greater than 101 degrees F after 48 hours (low grade fevers the day or two after surgery are normal)  Persistent nausea or vomiting  Decreased sensation or discoloration of the operative limb  Pain or swelling that is getting worse and not better with medication    Dr. Hendrix's Office Contact: [phone]

## 2024-07-15 ENCOUNTER — ANESTHESIA (OUTPATIENT)
Dept: PERIOP | Facility: HOSPITAL | Age: 59
End: 2024-07-15
Payer: MEDICARE

## 2024-07-15 ENCOUNTER — HOSPITAL ENCOUNTER (INPATIENT)
Facility: HOSPITAL | Age: 59
LOS: 6 days | End: 2024-07-21
Attending: STUDENT IN AN ORGANIZED HEALTH CARE EDUCATION/TRAINING PROGRAM | Admitting: STUDENT IN AN ORGANIZED HEALTH CARE EDUCATION/TRAINING PROGRAM
Payer: MEDICARE

## 2024-07-15 ENCOUNTER — APPOINTMENT (INPATIENT)
Dept: RADIOLOGY | Facility: HOSPITAL | Age: 59
End: 2024-07-15
Payer: MEDICARE

## 2024-07-15 DIAGNOSIS — Z79.4 TYPE 2 DIABETES MELLITUS WITHOUT COMPLICATION, WITH LONG-TERM CURRENT USE OF INSULIN (HCC): ICD-10-CM

## 2024-07-15 DIAGNOSIS — S82.131P: ICD-10-CM

## 2024-07-15 DIAGNOSIS — I10 HYPERTENSION, ESSENTIAL: ICD-10-CM

## 2024-07-15 DIAGNOSIS — M17.31 POST-TRAUMATIC OSTEOARTHRITIS OF RIGHT KNEE: Primary | ICD-10-CM

## 2024-07-15 DIAGNOSIS — Z79.4 DIABETES MELLITUS TYPE 2, INSULIN DEPENDENT (HCC): ICD-10-CM

## 2024-07-15 DIAGNOSIS — E11.9 DIABETES MELLITUS TYPE 2, INSULIN DEPENDENT (HCC): ICD-10-CM

## 2024-07-15 DIAGNOSIS — E11.9 TYPE 2 DIABETES MELLITUS WITHOUT COMPLICATION, WITH LONG-TERM CURRENT USE OF INSULIN (HCC): ICD-10-CM

## 2024-07-15 DIAGNOSIS — M00.9 PYOGENIC ARTHRITIS OF RIGHT KNEE JOINT, DUE TO UNSPECIFIED ORGANISM (HCC): ICD-10-CM

## 2024-07-15 LAB
GLUCOSE SERPL-MCNC: 138 MG/DL (ref 65–140)
GLUCOSE SERPL-MCNC: 150 MG/DL (ref 65–140)
GLUCOSE SERPL-MCNC: 155 MG/DL (ref 65–140)
GLUCOSE SERPL-MCNC: 167 MG/DL (ref 65–140)

## 2024-07-15 PROCEDURE — 87176 TISSUE HOMOGENIZATION CULTR: CPT | Performed by: STUDENT IN AN ORGANIZED HEALTH CARE EDUCATION/TRAINING PROGRAM

## 2024-07-15 PROCEDURE — C1776 JOINT DEVICE (IMPLANTABLE): HCPCS | Performed by: STUDENT IN AN ORGANIZED HEALTH CARE EDUCATION/TRAINING PROGRAM

## 2024-07-15 PROCEDURE — 82948 REAGENT STRIP/BLOOD GLUCOSE: CPT

## 2024-07-15 PROCEDURE — C1713 ANCHOR/SCREW BN/BN,TIS/BN: HCPCS | Performed by: STUDENT IN AN ORGANIZED HEALTH CARE EDUCATION/TRAINING PROGRAM

## 2024-07-15 PROCEDURE — 99222 1ST HOSP IP/OBS MODERATE 55: CPT | Performed by: INTERNAL MEDICINE

## 2024-07-15 PROCEDURE — 27447 TOTAL KNEE ARTHROPLASTY: CPT | Performed by: STUDENT IN AN ORGANIZED HEALTH CARE EDUCATION/TRAINING PROGRAM

## 2024-07-15 PROCEDURE — 87070 CULTURE OTHR SPECIMN AEROBIC: CPT | Performed by: STUDENT IN AN ORGANIZED HEALTH CARE EDUCATION/TRAINING PROGRAM

## 2024-07-15 PROCEDURE — 0SHC08Z INSERTION OF SPACER INTO RIGHT KNEE JOINT, OPEN APPROACH: ICD-10-PCS | Performed by: STUDENT IN AN ORGANIZED HEALTH CARE EDUCATION/TRAINING PROGRAM

## 2024-07-15 PROCEDURE — 87075 CULTR BACTERIA EXCEPT BLOOD: CPT | Performed by: STUDENT IN AN ORGANIZED HEALTH CARE EDUCATION/TRAINING PROGRAM

## 2024-07-15 PROCEDURE — 0QBG0ZZ EXCISION OF RIGHT TIBIA, OPEN APPROACH: ICD-10-PCS | Performed by: STUDENT IN AN ORGANIZED HEALTH CARE EDUCATION/TRAINING PROGRAM

## 2024-07-15 PROCEDURE — 97530 THERAPEUTIC ACTIVITIES: CPT

## 2024-07-15 PROCEDURE — 97163 PT EVAL HIGH COMPLEX 45 MIN: CPT

## 2024-07-15 PROCEDURE — 73560 X-RAY EXAM OF KNEE 1 OR 2: CPT

## 2024-07-15 PROCEDURE — 87205 SMEAR GRAM STAIN: CPT | Performed by: STUDENT IN AN ORGANIZED HEALTH CARE EDUCATION/TRAINING PROGRAM

## 2024-07-15 PROCEDURE — 0SBC0ZX EXCISION OF RIGHT KNEE JOINT, OPEN APPROACH, DIAGNOSTIC: ICD-10-PCS | Performed by: STUDENT IN AN ORGANIZED HEALTH CARE EDUCATION/TRAINING PROGRAM

## 2024-07-15 PROCEDURE — 97110 THERAPEUTIC EXERCISES: CPT

## 2024-07-15 PROCEDURE — 0QPG07Z REMOVAL OF AUTOLOGOUS TISSUE SUBSTITUTE FROM RIGHT TIBIA, OPEN APPROACH: ICD-10-PCS | Performed by: STUDENT IN AN ORGANIZED HEALTH CARE EDUCATION/TRAINING PROGRAM

## 2024-07-15 PROCEDURE — 0SRC0J9 REPLACEMENT OF RIGHT KNEE JOINT WITH SYNTHETIC SUBSTITUTE, CEMENTED, OPEN APPROACH: ICD-10-PCS | Performed by: STUDENT IN AN ORGANIZED HEALTH CARE EDUCATION/TRAINING PROGRAM

## 2024-07-15 PROCEDURE — 87102 FUNGUS ISOLATION CULTURE: CPT | Performed by: STUDENT IN AN ORGANIZED HEALTH CARE EDUCATION/TRAINING PROGRAM

## 2024-07-15 DEVICE — ATTUNE KNEE SYSTEM ALL POLY TIBIAL IMPLANT CRUCIATE RETAINING SIZE 7, 10MM AOX
Type: IMPLANTABLE DEVICE | Site: KNEE | Status: FUNCTIONAL
Brand: ATTUNE

## 2024-07-15 DEVICE — DEPUY CMW 2 FAST SET BONE CEMENT 20G: Type: IMPLANTABLE DEVICE | Site: KNEE | Status: FUNCTIONAL

## 2024-07-15 DEVICE — ATTUNE KNEE SYSTEM REVISION POSTERIOR FEMORAL AUGMENT 12MM CEMENTED SIZE 8
Type: IMPLANTABLE DEVICE | Site: KNEE | Status: FUNCTIONAL
Brand: ATTUNE

## 2024-07-15 DEVICE — ATTUNE KNEE SYSTEM REVISION POSTERIOR FEMORAL AUGMENT 8MM CEMENTED SIZE 8
Type: IMPLANTABLE DEVICE | Site: KNEE | Status: FUNCTIONAL
Brand: ATTUNE

## 2024-07-15 DEVICE — FULL DOSE BONE CEMENT, 10 PACK CATALOG NUMBER IS 6191-1-010
Type: IMPLANTABLE DEVICE | Site: KNEE | Status: FUNCTIONAL
Brand: SIMPLEX

## 2024-07-15 DEVICE — ATTUNE KNEE SYSTEM FEMORAL CRUCIATE RETAINING SIZE 8 RIGHT CEMENTED
Type: IMPLANTABLE DEVICE | Site: KNEE | Status: FUNCTIONAL
Brand: ATTUNE

## 2024-07-15 DEVICE — ATTUNE KNEE SYSTEM REVISION TIBIAL AUGMENT 15MM RM/LL CEMENTED SIZE 7/8
Type: IMPLANTABLE DEVICE | Site: KNEE | Status: FUNCTIONAL
Brand: ATTUNE

## 2024-07-15 DEVICE — ATTUNE PATELLA MEDIALIZED DOME 38MM CEMENTED AOX
Type: IMPLANTABLE DEVICE | Site: PATELLA | Status: FUNCTIONAL
Brand: ATTUNE

## 2024-07-15 RX ORDER — SODIUM CHLORIDE, SODIUM LACTATE, POTASSIUM CHLORIDE, CALCIUM CHLORIDE 600; 310; 30; 20 MG/100ML; MG/100ML; MG/100ML; MG/100ML
100 INJECTION, SOLUTION INTRAVENOUS CONTINUOUS
Status: DISCONTINUED | OUTPATIENT
Start: 2024-07-15 | End: 2024-07-15

## 2024-07-15 RX ORDER — OXYCODONE HYDROCHLORIDE 5 MG/1
5 TABLET ORAL EVERY 4 HOURS PRN
Qty: 42 TABLET | Refills: 0 | Status: SHIPPED | OUTPATIENT
Start: 2024-07-15 | End: 2024-08-01

## 2024-07-15 RX ORDER — ACETAMINOPHEN 325 MG/1
975 TABLET ORAL EVERY 8 HOURS
Status: DISCONTINUED | OUTPATIENT
Start: 2024-07-15 | End: 2024-07-21 | Stop reason: HOSPADM

## 2024-07-15 RX ORDER — SIMETHICONE 80 MG
80 TABLET,CHEWABLE ORAL 4 TIMES DAILY PRN
Status: DISCONTINUED | OUTPATIENT
Start: 2024-07-15 | End: 2024-07-18

## 2024-07-15 RX ORDER — INSULIN LISPRO 100 [IU]/ML
1-6 INJECTION, SOLUTION INTRAVENOUS; SUBCUTANEOUS
Status: DISCONTINUED | OUTPATIENT
Start: 2024-07-15 | End: 2024-07-21 | Stop reason: HOSPADM

## 2024-07-15 RX ORDER — VANCOMYCIN HYDROCHLORIDE 1 G/20ML
INJECTION, POWDER, LYOPHILIZED, FOR SOLUTION INTRAVENOUS AS NEEDED
Status: DISCONTINUED | OUTPATIENT
Start: 2024-07-15 | End: 2024-07-15 | Stop reason: HOSPADM

## 2024-07-15 RX ORDER — CHLORHEXIDINE GLUCONATE 40 MG/ML
SOLUTION TOPICAL DAILY PRN
Status: DISCONTINUED | OUTPATIENT
Start: 2024-07-15 | End: 2024-07-15 | Stop reason: HOSPADM

## 2024-07-15 RX ORDER — ACETAMINOPHEN 500 MG
1000 TABLET ORAL EVERY 8 HOURS
Qty: 60 TABLET | Refills: 0 | Status: ON HOLD | OUTPATIENT
Start: 2024-07-15 | End: 2024-08-01

## 2024-07-15 RX ORDER — PANTOPRAZOLE SODIUM 40 MG/1
40 TABLET, DELAYED RELEASE ORAL DAILY
Status: DISCONTINUED | OUTPATIENT
Start: 2024-07-15 | End: 2024-07-15 | Stop reason: SDUPTHER

## 2024-07-15 RX ORDER — AMOXICILLIN 250 MG
1 CAPSULE ORAL DAILY
Qty: 30 TABLET | Refills: 0 | Status: SHIPPED | OUTPATIENT
Start: 2024-07-15 | End: 2024-08-01

## 2024-07-15 RX ORDER — ONDANSETRON 2 MG/ML
4 INJECTION INTRAMUSCULAR; INTRAVENOUS EVERY 6 HOURS PRN
Status: DISCONTINUED | OUTPATIENT
Start: 2024-07-15 | End: 2024-07-21 | Stop reason: HOSPADM

## 2024-07-15 RX ORDER — PANTOPRAZOLE SODIUM 40 MG/1
40 TABLET, DELAYED RELEASE ORAL EVERY OTHER DAY
Status: DISCONTINUED | OUTPATIENT
Start: 2024-07-15 | End: 2024-07-21 | Stop reason: HOSPADM

## 2024-07-15 RX ORDER — ATORVASTATIN CALCIUM 40 MG/1
40 TABLET, FILM COATED ORAL
Status: DISCONTINUED | OUTPATIENT
Start: 2024-07-15 | End: 2024-07-21 | Stop reason: HOSPADM

## 2024-07-15 RX ORDER — FENTANYL CITRATE/PF 50 MCG/ML
50 SYRINGE (ML) INJECTION
Status: DISCONTINUED | OUTPATIENT
Start: 2024-07-15 | End: 2024-07-15 | Stop reason: HOSPADM

## 2024-07-15 RX ORDER — PROMETHAZINE HYDROCHLORIDE 25 MG/ML
6.25 INJECTION, SOLUTION INTRAMUSCULAR; INTRAVENOUS ONCE AS NEEDED
Status: DISCONTINUED | OUTPATIENT
Start: 2024-07-15 | End: 2024-07-15 | Stop reason: HOSPADM

## 2024-07-15 RX ORDER — HYDROMORPHONE HCL/PF 1 MG/ML
0.5 SYRINGE (ML) INJECTION EVERY 2 HOUR PRN
Status: DISCONTINUED | OUTPATIENT
Start: 2024-07-15 | End: 2024-07-17

## 2024-07-15 RX ORDER — EPHEDRINE SULFATE 50 MG/ML
INJECTION INTRAVENOUS AS NEEDED
Status: DISCONTINUED | OUTPATIENT
Start: 2024-07-15 | End: 2024-07-15

## 2024-07-15 RX ORDER — MIDAZOLAM HYDROCHLORIDE 2 MG/2ML
INJECTION, SOLUTION INTRAMUSCULAR; INTRAVENOUS AS NEEDED
Status: DISCONTINUED | OUTPATIENT
Start: 2024-07-15 | End: 2024-07-15

## 2024-07-15 RX ORDER — ONDANSETRON 4 MG/1
4 TABLET, ORALLY DISINTEGRATING ORAL EVERY 6 HOURS PRN
Qty: 20 TABLET | Refills: 0 | Status: ON HOLD | OUTPATIENT
Start: 2024-07-15 | End: 2024-07-31

## 2024-07-15 RX ORDER — ZOLPIDEM TARTRATE 5 MG/1
10 TABLET ORAL
Status: DISCONTINUED | OUTPATIENT
Start: 2024-07-15 | End: 2024-07-21 | Stop reason: HOSPADM

## 2024-07-15 RX ORDER — ASCORBIC ACID 500 MG
500 TABLET ORAL 2 TIMES DAILY
Status: DISCONTINUED | OUTPATIENT
Start: 2024-07-15 | End: 2024-07-21 | Stop reason: HOSPADM

## 2024-07-15 RX ORDER — SODIUM CHLORIDE, SODIUM LACTATE, POTASSIUM CHLORIDE, CALCIUM CHLORIDE 600; 310; 30; 20 MG/100ML; MG/100ML; MG/100ML; MG/100ML
125 INJECTION, SOLUTION INTRAVENOUS CONTINUOUS
Status: DISCONTINUED | OUTPATIENT
Start: 2024-07-15 | End: 2024-07-17

## 2024-07-15 RX ORDER — DOCUSATE SODIUM 100 MG/1
100 CAPSULE, LIQUID FILLED ORAL 2 TIMES DAILY
Status: DISCONTINUED | OUTPATIENT
Start: 2024-07-15 | End: 2024-07-21 | Stop reason: HOSPADM

## 2024-07-15 RX ORDER — OXYCODONE HYDROCHLORIDE 10 MG/1
10 TABLET ORAL EVERY 4 HOURS PRN
Status: DISCONTINUED | OUTPATIENT
Start: 2024-07-15 | End: 2024-07-17

## 2024-07-15 RX ORDER — ACETAMINOPHEN 325 MG/1
975 TABLET ORAL ONCE
Status: COMPLETED | OUTPATIENT
Start: 2024-07-15 | End: 2024-07-15

## 2024-07-15 RX ORDER — CEFAZOLIN SODIUM 2 G/50ML
2000 SOLUTION INTRAVENOUS ONCE
Status: COMPLETED | OUTPATIENT
Start: 2024-07-15 | End: 2024-07-15

## 2024-07-15 RX ORDER — GLYCOPYRROLATE 0.2 MG/ML
INJECTION INTRAMUSCULAR; INTRAVENOUS AS NEEDED
Status: DISCONTINUED | OUTPATIENT
Start: 2024-07-15 | End: 2024-07-15

## 2024-07-15 RX ORDER — TRANEXAMIC ACID 10 MG/ML
1000 INJECTION, SOLUTION INTRAVENOUS ONCE
Status: DISCONTINUED | OUTPATIENT
Start: 2024-07-15 | End: 2024-07-15 | Stop reason: HOSPADM

## 2024-07-15 RX ORDER — TRANEXAMIC ACID 100 MG/ML
INJECTION, SOLUTION INTRAVENOUS AS NEEDED
Status: DISCONTINUED | OUTPATIENT
Start: 2024-07-15 | End: 2024-07-15

## 2024-07-15 RX ORDER — SENNOSIDES 8.6 MG
1 TABLET ORAL DAILY
Status: DISCONTINUED | OUTPATIENT
Start: 2024-07-15 | End: 2024-07-21 | Stop reason: HOSPADM

## 2024-07-15 RX ORDER — ONDANSETRON 2 MG/ML
4 INJECTION INTRAMUSCULAR; INTRAVENOUS ONCE AS NEEDED
Status: COMPLETED | OUTPATIENT
Start: 2024-07-15 | End: 2024-07-15

## 2024-07-15 RX ORDER — ACETAMINOPHEN 325 MG/1
650 TABLET ORAL EVERY 4 HOURS PRN
Status: DISCONTINUED | OUTPATIENT
Start: 2024-07-15 | End: 2024-07-21 | Stop reason: HOSPADM

## 2024-07-15 RX ORDER — FOLIC ACID 1 MG/1
1 TABLET ORAL DAILY
Status: DISCONTINUED | OUTPATIENT
Start: 2024-07-15 | End: 2024-07-21 | Stop reason: HOSPADM

## 2024-07-15 RX ORDER — CHLORHEXIDINE GLUCONATE ORAL RINSE 1.2 MG/ML
15 SOLUTION DENTAL ONCE
Status: COMPLETED | OUTPATIENT
Start: 2024-07-15 | End: 2024-07-15

## 2024-07-15 RX ORDER — CELECOXIB 200 MG/1
200 CAPSULE ORAL 2 TIMES DAILY
Qty: 60 CAPSULE | Refills: 0 | Status: SHIPPED | OUTPATIENT
Start: 2024-07-15 | End: 2024-08-01

## 2024-07-15 RX ORDER — PROPOFOL 10 MG/ML
INJECTION, EMULSION INTRAVENOUS AS NEEDED
Status: DISCONTINUED | OUTPATIENT
Start: 2024-07-15 | End: 2024-07-15

## 2024-07-15 RX ORDER — TOBRAMYCIN 1.2 G/30ML
INJECTION, POWDER, LYOPHILIZED, FOR SOLUTION INTRAVENOUS AS NEEDED
Status: DISCONTINUED | OUTPATIENT
Start: 2024-07-15 | End: 2024-07-15 | Stop reason: HOSPADM

## 2024-07-15 RX ORDER — INSULIN GLARGINE 100 [IU]/ML
45 INJECTION, SOLUTION SUBCUTANEOUS EVERY 12 HOURS SCHEDULED
Status: DISCONTINUED | OUTPATIENT
Start: 2024-07-15 | End: 2024-07-15

## 2024-07-15 RX ORDER — MEPERIDINE HYDROCHLORIDE 25 MG/ML
12.5 INJECTION INTRAMUSCULAR; INTRAVENOUS; SUBCUTANEOUS ONCE AS NEEDED
Status: DISCONTINUED | OUTPATIENT
Start: 2024-07-15 | End: 2024-07-15 | Stop reason: HOSPADM

## 2024-07-15 RX ORDER — ONDANSETRON 2 MG/ML
INJECTION INTRAMUSCULAR; INTRAVENOUS AS NEEDED
Status: DISCONTINUED | OUTPATIENT
Start: 2024-07-15 | End: 2024-07-15

## 2024-07-15 RX ORDER — GABAPENTIN 300 MG/1
300 CAPSULE ORAL
Status: DISCONTINUED | OUTPATIENT
Start: 2024-07-15 | End: 2024-07-21 | Stop reason: HOSPADM

## 2024-07-15 RX ORDER — HYDROMORPHONE HCL/PF 1 MG/ML
0.5 SYRINGE (ML) INJECTION
Status: DISCONTINUED | OUTPATIENT
Start: 2024-07-15 | End: 2024-07-15 | Stop reason: HOSPADM

## 2024-07-15 RX ORDER — PROPOFOL 10 MG/ML
INJECTION, EMULSION INTRAVENOUS CONTINUOUS PRN
Status: DISCONTINUED | OUTPATIENT
Start: 2024-07-15 | End: 2024-07-15

## 2024-07-15 RX ORDER — CALCIUM CARBONATE 500 MG/1
1000 TABLET, CHEWABLE ORAL DAILY PRN
Status: DISCONTINUED | OUTPATIENT
Start: 2024-07-15 | End: 2024-07-18

## 2024-07-15 RX ORDER — OXYCODONE HYDROCHLORIDE 5 MG/1
5 TABLET ORAL EVERY 4 HOURS PRN
Status: DISCONTINUED | OUTPATIENT
Start: 2024-07-15 | End: 2024-07-17

## 2024-07-15 RX ORDER — MAGNESIUM HYDROXIDE 1200 MG/15ML
LIQUID ORAL AS NEEDED
Status: DISCONTINUED | OUTPATIENT
Start: 2024-07-15 | End: 2024-07-15 | Stop reason: HOSPADM

## 2024-07-15 RX ORDER — CEFAZOLIN SODIUM 2 G/50ML
2000 SOLUTION INTRAVENOUS EVERY 8 HOURS
Status: DISCONTINUED | OUTPATIENT
Start: 2024-07-15 | End: 2024-07-15

## 2024-07-15 RX ORDER — ESCITALOPRAM OXALATE 5 MG/1
5 TABLET ORAL DAILY
Status: DISCONTINUED | OUTPATIENT
Start: 2024-07-15 | End: 2024-07-21 | Stop reason: HOSPADM

## 2024-07-15 RX ORDER — SODIUM CHLORIDE 9 MG/ML
125 INJECTION, SOLUTION INTRAVENOUS CONTINUOUS
Status: DISCONTINUED | OUTPATIENT
Start: 2024-07-15 | End: 2024-07-15

## 2024-07-15 RX ORDER — INSULIN GLARGINE 100 [IU]/ML
30 INJECTION, SOLUTION SUBCUTANEOUS EVERY 12 HOURS SCHEDULED
Status: DISCONTINUED | OUTPATIENT
Start: 2024-07-15 | End: 2024-07-16

## 2024-07-15 RX ADMIN — GLYCOPYRROLATE 0.2 MG: 0.2 INJECTION, SOLUTION INTRAMUSCULAR; INTRAVENOUS at 10:57

## 2024-07-15 RX ADMIN — VERAPAMIL HYDROCHLORIDE 240 MG: 120 TABLET, FILM COATED, EXTENDED RELEASE ORAL at 21:10

## 2024-07-15 RX ADMIN — ONDANSETRON 4 MG: 2 INJECTION INTRAMUSCULAR; INTRAVENOUS at 10:32

## 2024-07-15 RX ADMIN — EPHEDRINE SULFATE 7.5 MG: 50 INJECTION, SOLUTION INTRAVENOUS at 11:50

## 2024-07-15 RX ADMIN — ACETAMINOPHEN 975 MG: 325 TABLET, FILM COATED ORAL at 22:29

## 2024-07-15 RX ADMIN — HYDROMORPHONE HYDROCHLORIDE 0.5 MG: 1 INJECTION, SOLUTION INTRAMUSCULAR; INTRAVENOUS; SUBCUTANEOUS at 23:28

## 2024-07-15 RX ADMIN — DAPTOMYCIN 575 MG: 500 INJECTION, POWDER, LYOPHILIZED, FOR SOLUTION INTRAVENOUS at 17:35

## 2024-07-15 RX ADMIN — ONDANSETRON 4 MG: 2 INJECTION INTRAMUSCULAR; INTRAVENOUS at 14:15

## 2024-07-15 RX ADMIN — EPHEDRINE SULFATE 5 MG: 50 INJECTION, SOLUTION INTRAVENOUS at 10:54

## 2024-07-15 RX ADMIN — PROPOFOL 80 MCG/KG/MIN: 10 INJECTION, EMULSION INTRAVENOUS at 10:32

## 2024-07-15 RX ADMIN — Medication 1 TABLET: at 16:02

## 2024-07-15 RX ADMIN — OXYCODONE 5 MG: 5 TABLET ORAL at 17:42

## 2024-07-15 RX ADMIN — EPHEDRINE SULFATE 10 MG: 50 INJECTION, SOLUTION INTRAVENOUS at 10:58

## 2024-07-15 RX ADMIN — MIDAZOLAM 2 MG: 1 INJECTION INTRAMUSCULAR; INTRAVENOUS at 10:26

## 2024-07-15 RX ADMIN — SODIUM CHLORIDE, SODIUM LACTATE, POTASSIUM CHLORIDE, AND CALCIUM CHLORIDE 125 ML/HR: .6; .31; .03; .02 INJECTION, SOLUTION INTRAVENOUS at 14:00

## 2024-07-15 RX ADMIN — ESCITALOPRAM 5 MG: 5 TABLET, FILM COATED ORAL at 16:02

## 2024-07-15 RX ADMIN — ATORVASTATIN CALCIUM 40 MG: 40 TABLET, FILM COATED ORAL at 16:02

## 2024-07-15 RX ADMIN — SODIUM CHLORIDE: 0.9 INJECTION, SOLUTION INTRAVENOUS at 11:29

## 2024-07-15 RX ADMIN — OXYCODONE HYDROCHLORIDE 10 MG: 10 TABLET ORAL at 21:09

## 2024-07-15 RX ADMIN — CHLORHEXIDINE GLUCONATE 15 ML: 1.2 RINSE ORAL at 09:25

## 2024-07-15 RX ADMIN — TRANEXAMIC ACID 1 G: 100 INJECTION, SOLUTION INTRAVENOUS at 10:32

## 2024-07-15 RX ADMIN — INSULIN GLARGINE 30 UNITS: 100 INJECTION, SOLUTION SUBCUTANEOUS at 21:09

## 2024-07-15 RX ADMIN — CEFAZOLIN SODIUM 2000 MG: 2 SOLUTION INTRAVENOUS at 10:21

## 2024-07-15 RX ADMIN — HYDROMORPHONE HYDROCHLORIDE 0.5 MG: 1 INJECTION, SOLUTION INTRAMUSCULAR; INTRAVENOUS; SUBCUTANEOUS at 16:12

## 2024-07-15 RX ADMIN — PANTOPRAZOLE SODIUM 40 MG: 40 TABLET, DELAYED RELEASE ORAL at 16:01

## 2024-07-15 RX ADMIN — SODIUM CHLORIDE: 0.9 INJECTION, SOLUTION INTRAVENOUS at 10:46

## 2024-07-15 RX ADMIN — SODIUM CHLORIDE 125 ML/HR: 0.9 INJECTION, SOLUTION INTRAVENOUS at 09:26

## 2024-07-15 RX ADMIN — ONDANSETRON 4 MG: 2 INJECTION INTRAMUSCULAR; INTRAVENOUS at 21:09

## 2024-07-15 RX ADMIN — ASPIRIN 81 MG: 81 TABLET, COATED ORAL at 21:10

## 2024-07-15 RX ADMIN — INSULIN GLARGINE 45 UNITS: 100 INJECTION, SOLUTION SUBCUTANEOUS at 16:03

## 2024-07-15 RX ADMIN — INSULIN LISPRO 1 UNITS: 100 INJECTION, SOLUTION INTRAVENOUS; SUBCUTANEOUS at 16:03

## 2024-07-15 RX ADMIN — ACETAMINOPHEN 975 MG: 325 TABLET, FILM COATED ORAL at 09:25

## 2024-07-15 RX ADMIN — OXYCODONE HYDROCHLORIDE AND ACETAMINOPHEN 500 MG: 500 TABLET ORAL at 17:51

## 2024-07-15 RX ADMIN — HYDROMORPHONE HYDROCHLORIDE 0.5 MG: 1 INJECTION, SOLUTION INTRAMUSCULAR; INTRAVENOUS; SUBCUTANEOUS at 19:33

## 2024-07-15 RX ADMIN — SODIUM CHLORIDE, SODIUM LACTATE, POTASSIUM CHLORIDE, AND CALCIUM CHLORIDE 100 ML/HR: .6; .31; .03; .02 INJECTION, SOLUTION INTRAVENOUS at 15:56

## 2024-07-15 RX ADMIN — FOLIC ACID 1 MG: 1 TABLET ORAL at 16:02

## 2024-07-15 RX ADMIN — PROPOFOL 40 MG: 10 INJECTION, EMULSION INTRAVENOUS at 10:32

## 2024-07-15 RX ADMIN — ACETAMINOPHEN 975 MG: 325 TABLET, FILM COATED ORAL at 16:00

## 2024-07-15 NOTE — PLAN OF CARE
Problem: Prexisting or High Potential for Compromised Skin Integrity  Goal: Skin integrity is maintained or improved  Description: INTERVENTIONS:  - Identify patients at risk for skin breakdown  - Assess and monitor skin integrity  - Assess and monitor nutrition and hydration status  - Monitor labs   - Assess for incontinence   - Turn and reposition patient  - Assist with mobility/ambulation  - Relieve pressure over bony prominences  - Avoid friction and shearing  - Provide appropriate hygiene as needed including keeping skin clean and dry  - Evaluate need for skin moisturizer/barrier cream  - Collaborate with interdisciplinary team   - Patient/family teaching  - Consider wound care consult   Outcome: Progressing     Problem: PAIN - ADULT  Goal: Verbalizes/displays adequate comfort level or baseline comfort level  Description: Interventions:  - Encourage patient to monitor pain and request assistance  - Assess pain using appropriate pain scale  - Administer analgesics based on type and severity of pain and evaluate response  - Implement non-pharmacological measures as appropriate and evaluate response  - Consider cultural and social influences on pain and pain management  - Notify physician/advanced practitioner if interventions unsuccessful or patient reports new pain  Outcome: Progressing     Problem: SAFETY ADULT  Goal: Patient will remain free of falls  Description: INTERVENTIONS:  - Educate patient/family on patient safety including physical limitations  - Instruct patient to call for assistance with activity   - Consult OT/PT to assist with strengthening/mobility   - Keep Call bell within reach  - Keep bed low and locked with side rails adjusted as appropriate  - Keep care items and personal belongings within reach  - Initiate and maintain comfort rounds  - Make Fall Risk Sign visible to staff  - Offer Toileting every 2 Hours, in advance of need  - Initiate/Maintain bed alarm  - Obtain necessary fall risk  management equipment: bed alarm and yellow socks  - Apply yellow socks and bracelet for high fall risk patients  - Consider moving patient to room near nurses station  Outcome: Progressing     Problem: DISCHARGE PLANNING  Goal: Discharge to home or other facility with appropriate resources  Description: INTERVENTIONS:  - Identify barriers to discharge w/patient and caregiver  - Arrange for needed discharge resources and transportation as appropriate  - Identify discharge learning needs (meds, wound care, etc.)  - Arrange for interpretive services to assist at discharge as needed  - Refer to Case Management Department for coordinating discharge planning if the patient needs post-hospital services based on physician/advanced practitioner order or complex needs related to functional status, cognitive ability, or social support system  Outcome: Progressing     Problem: Knowledge Deficit  Goal: Patient/family/caregiver demonstrates understanding of disease process, treatment plan, medications, and discharge instructions  Description: Complete learning assessment and assess knowledge base.  Interventions:  - Provide teaching at level of understanding  - Provide teaching via preferred learning methods  Outcome: Progressing     Problem: GASTROINTESTINAL - ADULT  Goal: Maintains or returns to baseline bowel function  Description: INTERVENTIONS:  - Assess bowel function  - Encourage oral fluids to ensure adequate hydration  - Administer IV fluids if ordered to ensure adequate hydration  - Administer ordered medications as needed  - Encourage mobilization and activity  - Consider nutritional services referral to assist patient with adequate nutrition and appropriate food choices  Outcome: Progressing  Goal: Maintains adequate nutritional intake  Description: INTERVENTIONS:  - Monitor percentage of each meal consumed  - Identify factors contributing to decreased intake, treat as appropriate  - Assist with meals as needed  -  Monitor I&O, weight, and lab values if indicated  - Obtain nutrition services referral as needed  Outcome: Progressing     Problem: GENITOURINARY - ADULT  Goal: Maintains or returns to baseline urinary function  Description: INTERVENTIONS:  - Assess urinary function  - Encourage oral fluids to ensure adequate hydration if ordered  - Administer IV fluids as ordered to ensure adequate hydration  - Administer ordered medications as needed  - Offer frequent toileting  - Follow urinary retention protocol if ordered  Outcome: Progressing     Problem: METABOLIC, FLUID AND ELECTROLYTES - ADULT  Goal: Electrolytes maintained within normal limits  Description: INTERVENTIONS:  - Monitor labs and assess patient for signs and symptoms of electrolyte imbalances  - Administer electrolyte replacement as ordered  - Monitor response to electrolyte replacements, including repeat lab results as appropriate  - Instruct patient on fluid and nutrition as appropriate  Outcome: Progressing  Goal: Fluid balance maintained  Description: INTERVENTIONS:  - Monitor labs   - Monitor I/O and WT  - Instruct patient on fluid and nutrition as appropriate  - Assess for signs & symptoms of volume excess or deficit  Outcome: Progressing  Goal: Glucose maintained within target range  Description: INTERVENTIONS:  - Monitor Blood Glucose as ordered  - Assess for signs and symptoms of hyperglycemia and hypoglycemia  - Administer ordered medications to maintain glucose within target range  - Assess nutritional intake and initiate nutrition service referral as needed  Outcome: Progressing     Problem: SKIN/TISSUE INTEGRITY - ADULT  Goal: Incision(s), wounds(s) or drain site(s) healing without S/S of infection  Description: INTERVENTIONS  - Assess and document dressing, incision, wound bed, drain sites and surrounding tissue  - Provide patient and family education    Outcome: Progressing    Patient arrived to unit from PACU. Alert and oriented x4. Assessment  completed. Patient referred pain (medications administered as ordered). Surgical site with intact dressing. No signs of bleeding. R knee swollen. Patient worked with PT.

## 2024-07-15 NOTE — ANESTHESIA POSTPROCEDURE EVALUATION
Post-Op Assessment Note    CV Status:  Stable    Pain management: adequate       Mental Status:  Alert     Post Op Vitals Reviewed: Yes    No anethesia notable event occurred.    Staff: CRNA               BP (!) 85/51 (07/15/24 1336)    Temp (!) 97.1 °F (36.2 °C) (07/15/24 1336)    Pulse 70 (07/15/24 1336)   Resp 22 (07/15/24 1336)    SpO2 98 % (07/15/24 1336)

## 2024-07-15 NOTE — ANESTHESIA PREPROCEDURE EVALUATION
Procedure:  ARTHROPLASTY KNEE TOTAL (Right: Knee)    Relevant Problems   CARDIO   (+) Hyperlipidemia   (+) Hypertension, essential   (+) Type 2 diabetes mellitus with diabetic peripheral angiopathy without gangrene (HCC)      ENDO   (+) Acquired hypothyroidism   (+) Diabetes mellitus type 2, insulin dependent (HCC)   (+) HHS vs DKA   (+) Type 2 diabetes mellitus with diabetic peripheral angiopathy without gangrene (HCC)      GI/HEPATIC   (+) Gastroesophageal reflux disease without esophagitis      /RENAL   (+) MARKELL (acute kidney injury) (HCC)   (+) Chronic kidney disease-mineral and bone disorder   (+) Stage 3b chronic kidney disease (HCC)      MUSCULOSKELETAL   (+) Post-traumatic osteoarthritis of right knee      NEURO/PSYCH   (+) Depression and anxiety      PULMONARY   (+) MANNY (obstructive sleep apnea)        Physical Exam    Airway    Mallampati score: II  TM Distance: >3 FB  Neck ROM: full     Dental        Cardiovascular  Cardiovascular exam normal    Pulmonary  Pulmonary exam normal     Other Findings        Anesthesia Plan  ASA Score- 3     Anesthesia Type- spinal with ASA Monitors.         Additional Monitors:     Airway Plan:     Comment: +/- nerve block.       Plan Factors-    Chart reviewed. EKG reviewed.  Existing labs reviewed. Patient summary reviewed.          Obstructive sleep apnea risk education given perioperatively.        Induction- intravenous.    Postoperative Plan-         Informed Consent- Anesthetic plan and risks discussed with patient.  I personally reviewed this patient with the CRNA. Discussed and agreed on the Anesthesia Plan with the CRNA..

## 2024-07-15 NOTE — CONSULTS
Dear Dr Watson Hendrix,      At your kind request I evaluated Aaron Richards in medical consultation following right total knee replacement. As you know, the patient is a 59 y.o. male who previously fractured his tibial plateau and required open reduction internal fixation.  He developed a sinus tract.  The patient chest pain he underwent debridement of his knee joint and bone and placement of a total knee replacement.  The patient tolerated the procedure well.  When seen postoperatively, he was comfortable.  He denied any chest pain, shortness of breath, nausea, itching, etc.    The patient's past medical history is remarkable for type 2 diabetes.  Of late, this has been well-controlled.  He has a history of hypertension and hypercholesterolemia.  He has a history of thyroid cancer and is undergoing thyroidectomy.  He has peripheral neuropathy and peripheral vascular disease.  He has undergone right TMA.  The patient denies any cardiac problems, COPD, peptic ulcer disease, kidney disease, etc.    The patient's medications at the time of admission include:  Ascorbic acid 500 mg twice daily  Aspirin 81 mg daily  Atorvastatin 40 mg daily  Vitamin D 2000 units daily  Lexapro 5 mg daily  Folic acid 1 mg daily  Ibuprofen 600 mg every 6 hours as needed  Lantus 30 units twice daily  Apidra before meals via sliding scale  Levothyroxine 0.175 mg daily  Multivitamins 1 daily  Omeprazole 40 mg daily  Verapamil 360 mg daily  Zolpidem 10 mg at bedtime as needed    The patient has been intolerant to vancomycin which caused kidney problems.    Family history is noncontributory.    Social history reveals that the patient is an alcoholic.  He has been abstinent for over a year.  He does not smoke nor does he use illicit drugs.    Review of systems was taken in detail and was unrevealing.        Vitals:    07/15/24 1452   BP: 116/77   Pulse: 67   Resp: 18   Temp: (!) 96.5 °F (35.8 °C)   SpO2: 98%         Physical  Exam:  Well-developed, well-nourished, in no distress.  Head is atraumatic and normocephalic.  ENT examination reveals the dentition to be in poor repair.  Eye exam showed the pupils to be equal, round, and reactive to light.  Extraocular movements are intact.  Neck is supple.  Carotids are full without bruits.  There is no lymphadenopathy or goiter.  Lungs are clear to auscultation and percussion.  There is no wheezing, rales, or rhonchi.  Cardiac exam revealed a regular rhythm.  I heard no murmur, gallop, or rub.  The abdomen is soft with active bowel sounds.  There is no mass, tenderness, organomegaly.  Extremities showed no clubbing or cyanosis.  There was some swelling of the right calf but no tenderness.  Neurologic examination revealed the patient to be alert and oriented.  No focal sign was noted.      Assessment:   1.  Status post right total knee replacement  2.  Type 2 diabetes with neuropathy  3.  Hypertension  4.  Hypercholesterolemia  5.  Peripheral vascular disease  6.  Status post right TMA  7.  History of thyroid cancer with surgical hypothyroidism  8.  Poor dentition  9.  Alcoholism, currently abstinent    Recommendations:  The patient is doing well postoperatively.  He has been evaluated by infectious disease because of the likelihood of septic arthritis preoperatively.  He is currently on daptomycin.  The patient's diabetes has been well-controlled.  We will monitor his sugar and adjust his insulin as necessary.  He is on appropriate therapy for his blood pressure and cholesterol.  He is on appropriate risk factor modification for peripheral vascular disease.  His thyroid hormone replacement will be continued.    Thank you for the opportunity of participating in Aaron Richards's care. We will follow him with you during his stay.    Sincerely,  Clay Zavala MD

## 2024-07-15 NOTE — CONSULTS
Consultation - Infectious Disease   Aaron Richards 59 y.o. male MRN: [id number]  Unit/Bed#: E2 -01 Encounter: [id number]      IMPRESSION & RECOMMENDATIONS:   1.  Right knee septic arthritis.  In the setting of a previous traumatic injury with hardware failure requiring hardware removal and now has the development of a sinus tract extending into the joint space with operative findings consistent with a septic arthritis.  He has been off antibiotics since 7/2/2024.  Although previous cultures have been negative they were obtained while he has been on antibiotics.  He is now status post I&D, and placement of a spacer on 7/15/2024.  -Discontinue cefazolin  -Begin daptomycin 6 mg/kg IV every 24 hours  -Check baseline CPK  -Check CBC with differential and BMP to make sure no developing toxicity  -Follow-up operative cultures and adjust antibiotics needed  -Close orthopedics follow-up    2.  Poor dentition.  Patient with new hardware in his knee.  -Recommend OMFS evaluation for possible extractions    3.  Diabetes mellitus.  Type II.  Improved control with hemoglobin A1c of 6.2.    4.  Chronic kidney disease.  Stage III.  No labs this admission thus far.  -Check BMP to assess stability of the renal function  -Volume management  -Dose adjust the antibiotics needed    Discussed with the orthopedic service the plan to change the antibiotic to daptomycin and they agree with the plan    Extensive review of the medical records in epic including review of the notes, radiographs, and laboratory results     HISTORY OF PRESENT ILLNESS:  Reason for Consult: Septic knee  HPI: Aaron Richards is a 59 y.o. year old male with diabetes mellitus, chronic kidney disease, traumatic right tibial plateau fracture requiring open reduction internal fixation with collapse of his construct and removal of hardware we are asked to assist with antibiotic management of a septic arthritis with previous hardware in place.  The patient has  sustained a comminuted proximal tibial fracture and underwent open reduction internal fixation in May 2023.  However he suffered from collapse of his construct and required removal of hardware in January 2024.  He apparently had multiple bouts of what was felt to be superficial infections around the knee and had been treated with courses of antibiotics.  However no cultures were were sent with the exception of 1 time in which the cultures were negative.  He underwent previous aspiration of his knee with negative cultures.  Synovasure testing was negative back in March of this year.  He was planned to go undergo right total knee arthroplasty for repair however he was seen in the emergency department approximately 2 weeks ago when he developed what was felt to be a cyst involving the lateral aspect of the right knee.  He tells me he had had this cyst before and it did spontaneously drained pus and blood in the past.  In the emergency department they did an I&D of the cyst and sent the patient home on Keflex.  He was seen back in the orthopedic office and noted to have a draining sinus tract going into the joint space and at that time he underwent aspiration with Synovasure which was negative.  His antibiotics were discontinued and now he is admitted for definitive orthopedic intervention.  Today he underwent exploration of the knee and was found to have lateral sinus tract with extension down to the joint space, caseous material within the sinus tract with purulent fluid sent for culture, multiple tissue culture sent, he was found to have massive medial proximal tibial bone loss and near complete loss of the femoral posterior condyles.  He underwent extensive debridement, and placement of a spacer using antibiotic impregnated cement.  The patient admits to having occasional chills but denies any fever or drenching sweats.  He denies any nausea vomiting or diarrhea, denies any cough or shortness of breath.  He does  have some postoperative pain.    Of note in the past he has had MRSA bacteremia associated with a diabetic foot infection for which she received a course of intravenous vancomycin that was transition to daptomycin in the setting of acute kidney injury.  He does not want to see any more vancomycin because of his experience in the past.    REVIEW OF SYSTEMS:  A complete review of systems is negative other than that noted in the HPI.    PAST MEDICAL HISTORY:  Past Medical History:   Diagnosis Date    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    Chronic kidney disease     Colon polyp     Constipation 03/09/2023    Diabetes mellitus (HCC)     Disease of thyroid gland     GERD (gastroesophageal reflux disease)     HHS vs DKA 11/16/2018    Hyperlipidemia     Hypertension     Thyroid cancer (HCC)      Past Surgical History:   Procedure Laterality Date    COLONOSCOPY      FOOT AMPUTATION Right     FOOT SURGERY Right 2014    IR AORTAGRAM WITH RUN-OFF  08/06/2020    IR TUNNELED CENTRAL LINE PLACEMENT  09/08/2020    IR TUNNELED CENTRAL LINE REMOVAL  09/28/2020    NY AMPUTATION FOOT TRANSMETARSAL Right 09/03/2020    Procedure: AMPUTATION TRANSMETATARSAL (TMA);  Surgeon: Tracy Murillo DPM;  Location: [facility];  Service: Podiatry    NY OPEN TX TIBIAL FRACTURE PROXIMAL UNICONDYLAR Right 5/1/2023    Procedure: OPEN REDUCTION W/ INTERNAL FIXATION (ORIF) RIGHT TIBIAL PLATEAU NONUNION;  Surgeon: Sesar Hilario MD;  Location: [facility];  Service: Orthopedics    NY REMOVAL IMPLANT DEEP Right 1/22/2024    Procedure: REMOVAL HARDWARE TIBIA;  Surgeon: Sesar Hilario MD;  Location: [facility];  Service: Orthopedics    NY THYROIDECTOMY TOTAL/COMPLETE N/A 02/03/2021    Procedure: TOTAL THYROIDECTOMY WITH NIMS MONITORING;  Surgeon: Sesar Garvey MD;  Location: [facility];  Service: ENT    TOE AMPUTATION Right 08/11/2020    Procedure: AMPUTATION TOE;  Surgeon: Tracy Murillo DPM;  Location: [facility];  Service: Podiatry     US GUIDED THYROID BIOPSY  2020       FAMILY HISTORY:  Non-contributory    SOCIAL HISTORY:  Social History   Social History     Substance and Sexual Activity   Alcohol Use Not Currently    Comment: quit      Social History     Substance and Sexual Activity   Drug Use Not Currently    Types: Marijuana     Social History     Tobacco Use   Smoking Status Never   Smokeless Tobacco Never       ALLERGIES:  Allergies   Allergen Reactions    Vancomycin Other (See Comments)     Kidney issues    Pollen Extract Eye Swelling     Eyes get watery        MEDICATIONS:  All current active medications have been reviewed.  Antibiotics:.  Cefazolin, vancomycin and tobramycin in cement    PHYSICAL EXAM:  Temp:  [96.5 °F (35.8 °C)-97.8 °F (36.6 °C)] 96.5 °F (35.8 °C)  HR:  [64-70] 67  Resp:  [12-22] 18  BP: ()/(51-77) 116/77  SpO2:  [96 %-99 %] 98 %  Temp (24hrs), Av.1 °F (36.2 °C), Min:96.5 °F (35.8 °C), Max:97.8 °F (36.6 °C)  Current: Temperature: (!) 96.5 °F (35.8 °C)    Intake/Output Summary (Last 24 hours) at 7/15/2024 1612  Last data filed at 7/15/2024 1601  Gross per 24 hour   Intake 3740 ml   Output 765 ml   Net 2975 ml       General Appearance:  Appearing well, nontoxic, and in no distress   Head:  Normocephalic, without obvious abnormality, atraumatic   Eyes:  Conjunctiva pink and sclera anicteric, both eyes   Nose: Nares normal, mucosa normal, no drainage   Throat: Oropharynx moist poor dentition   Neck: Supple, symmetrical, no adenopathy, no tenderness/mass/nodules   Back:   Symmetric, no curvature, ROM normal, no CVA tenderness   Lungs:   Clear to auscultation bilaterally, respirations unlabored   Chest Wall:  No tenderness or deformity   Heart:  RRR; no murmur, rub or gallop   Abdomen:   Soft, non-tender, non-distended, positive bowel sounds    Extremities: Right knee dressed with some tenderness and swelling status post spacer placement.  Right foot status post TMA that is well-healed.   Skin: No  "rashes or lesions. No draining wounds noted.   Lymph nodes: Cervical, supraclavicular nodes normal   Neurologic: Alert and oriented times 3, extremity strength 5/5 and symmetric       LABS, IMAGING, & OTHER STUDIES:  Lab Results:  I have personally reviewed pertinent labs.            Invalid input(s): \"ALBUMIN\"                        Imaging Studies:     MRI right knee.  3/20/2024.  Severe posttraumatic deformity of the knee with loculated joint effusion.  No MRI evidence of osteomyelitis    Images personally reviewed by me in PACS and interpreted by me              "

## 2024-07-15 NOTE — PLAN OF CARE
Problem: PAIN - ADULT  Goal: Verbalizes/displays adequate comfort level or baseline comfort level  Description: Interventions:  - Encourage patient to monitor pain and request assistance  - Assess pain using appropriate pain scale  - Administer analgesics based on type and severity of pain and evaluate response  - Implement non-pharmacological measures as appropriate and evaluate response  - Consider cultural and social influences on pain and pain management  - Notify physician/advanced practitioner if interventions unsuccessful or patient reports new pain  Outcome: Progressing     Problem: SAFETY ADULT  Goal: Patient will remain free of falls  Description: INTERVENTIONS:  - Educate patient/family on patient safety including physical limitations  - Instruct patient to call for assistance with activity   - Consult OT/PT to assist with strengthening/mobility   - Keep Call bell within reach  - Keep bed low and locked with side rails adjusted as appropriate  - Keep care items and personal belongings within reach  - Initiate and maintain comfort rounds  - Make Fall Risk Sign visible to staff  - Offer Toileting every 2  Hours, in advance of need  - Initiate/Maintain bed alarm  - Obtain necessary fall risk management equipment: wheelchair  Problem: METABOLIC, FLUID AND ELECTROLYTES - ADULT  Goal: Electrolytes maintained within normal limits  Description: INTERVENTIONS:  - Monitor labs and assess patient for signs and symptoms of electrolyte imbalances  - Administer electrolyte replacement as ordered  - Monitor response to electrolyte replacements, including repeat lab results as appropriate  - Instruct patient on fluid and nutrition as appropriate  Outcome: Progressing     Problem: METABOLIC, FLUID AND ELECTROLYTES - ADULT  Goal: Glucose maintained within target range  Description: INTERVENTIONS:  - Monitor Blood Glucose as ordered  - Assess for signs and symptoms of hyperglycemia and hypoglycemia  - Administer ordered  medications to maintain glucose within target range  - Assess nutritional intake and initiate nutrition service referral as needed  Outcome: Progressing     - Apply yellow socks and bracelet for high fall risk patients  - Consider moving patient to room near nurses station  Outcome: Progressing     Problem: SKIN/TISSUE INTEGRITY - ADULT  Goal: Incision(s), wounds(s) or drain site(s) healing without S/S of infection  Description: INTERVENTIONS  - Assess and document dressing, incision, wound bed, drain sites and surrounding tissue  - Provide patient and family education  - Perform skin care/dressing changes every 4 hours  Outcome: Progressing     Problem: METABOLIC, FLUID AND ELECTROLYTES - ADULT  Goal: Fluid balance maintained  Description: INTERVENTIONS:  - Monitor labs   - Monitor I/O and WT  - Instruct patient on fluid and nutrition as appropriate  - Assess for signs & symptoms of volume excess or deficit  Outcome: Progressing     Problem: Knowledge Deficit  Goal: Patient/family/caregiver demonstrates understanding of disease process, treatment plan, medications, and discharge instructions  Description: Complete learning assessment and assess knowledge base.  Interventions:  - Provide teaching at level of understanding  - Provide teaching via preferred learning methods  Outcome: Progressing

## 2024-07-15 NOTE — PHYSICAL THERAPY NOTE
PHYSICAL THERAPY EVALUATION [id number] and tx [id number]    NAME:  Aaron Richards  DATE: 07/15/24    AGE:   59 y.o.  Mrn:   [id number]  ADMIT DX:  Post-traumatic osteoarthritis of right knee [M17.31]  Closed fracture of medial portion of right tibial plateau with malunion, subsequent encounter [I51.175P]    Patient Active Problem List   Diagnosis    Hypertension, essential    Gastroesophageal reflux disease without esophagitis    Hyperlipidemia    History of nonadherence to medical treatment    Right-sided tinnitus    Colon polyps    Elevated liver enzymes    PAD (peripheral artery disease) (HCC)    Thyroid cancer (HCC)    Urinary retention    Abnormal EKG    Insomnia    Persistent proteinuria    Acquired absence of right foot (HCC)    Acquired hypothyroidism    Type 2 diabetes mellitus with diabetic peripheral angiopathy without gangrene (HCC)    Dupuytren's contracture of right hand    Stage 3b chronic kidney disease (HCC)    Hyperkalemia    Alcoholism (HCC)    Closed fracture of right tibial plateau    MARKELL (acute kidney injury) ([facility])    Chronic kidney disease-mineral and bone disorder    Acquired genu varum of right lower extremity    Depression and anxiety    Constipation    MANNY (obstructive sleep apnea)    S/P hardware removal    Post-traumatic osteoarthritis of right knee    Chronic pain of right knee    Diabetes mellitus type 2, insulin dependent (HCC)       Past Medical History:   Diagnosis Date    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    Chronic kidney disease     Colon polyp     Constipation 03/09/2023    Diabetes mellitus (HCC)     Disease of thyroid gland     GERD (gastroesophageal reflux disease)     HHS vs DKA 11/16/2018    Hyperlipidemia     Hypertension     Thyroid cancer (HCC)        Past Surgical History:   Procedure Laterality Date    COLONOSCOPY      FOOT AMPUTATION Right     FOOT SURGERY Right 2014    IR AORTAGRAM WITH RUN-OFF  08/06/2020    IR TUNNELED CENTRAL LINE PLACEMENT   09/08/2020    IR TUNNELED CENTRAL LINE REMOVAL  09/28/2020    WY AMPUTATION FOOT TRANSMETARSAL Right 09/03/2020    Procedure: AMPUTATION TRANSMETATARSAL (TMA);  Surgeon: Tracy Murillo DPM;  Location: [facility];  Service: Podiatry    WY OPEN TX TIBIAL FRACTURE PROXIMAL UNICONDYLAR Right 5/1/2023    Procedure: OPEN REDUCTION W/ INTERNAL FIXATION (ORIF) RIGHT TIBIAL PLATEAU NONUNION;  Surgeon: Sesar Hilario MD;  Location: [facility];  Service: Orthopedics    WY REMOVAL IMPLANT DEEP Right 1/22/2024    Procedure: REMOVAL HARDWARE TIBIA;  Surgeon: Sesar Hilario MD;  Location: [facility];  Service: Orthopedics    WY THYROIDECTOMY TOTAL/COMPLETE N/A 02/03/2021    Procedure: TOTAL THYROIDECTOMY WITH NIMS MONITORING;  Surgeon: Sesar Garvey MD;  [facility]: [facility];  Service: ENT    TOE AMPUTATION Right 08/11/2020    Procedure: AMPUTATION TOE;  Surgeon: Tracy Murillo DPM;  Location: [facility];  Service: Podiatry    US GUIDED THYROID BIOPSY  07/02/2020       Imaging Studies:  XR knee right 1 or 2 views    (Results Pending)       Past Medical History:   Diagnosis Date    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    Chronic kidney disease     Colon polyp     Constipation 03/09/2023    Diabetes mellitus (HCC)     Disease of thyroid gland     GERD (gastroesophageal reflux disease)     HHS vs DKA 11/16/2018    Hyperlipidemia     Hypertension     Thyroid cancer (HCC)      Length Of Stay: 0  Performed at least 2 patient identifiers during session: Name and Birthday  PHYSICAL THERAPY EVALUATION :        07/15/24 [id number]   PT Last Visit   PT Visit Date 07/15/24   Note Type   Note type Evaluation  (and treatment)   Additional Comments Greeted supine in bed, amenable to evaluation.   Pain Assessment   Pain Assessment Tool 0-10   Pain Score 7  (6/10 at rest; 7/10 post activity)   Pain Location/Orientation Orientation: Left;Location: Knee   Hospital Pain Intervention(s) Repositioned;Ambulation/increased  "activity;Rest  (declined ice)   Restrictions/Precautions   Weight Bearing Precautions Per Order Yes   RLE Weight Bearing Per Order WBAT   Other Precautions Chair Alarm;Bed Alarm;Fall Risk;Pain;Multiple lines  (monitor BP)   Home Living   Type of Home House  ([place])   Home Layout One level;Stairs to enter with rails;Performs ADLs on one level;Able to live on main level with bedroom/bathroom  (rec area in basement; 3 DANIEL w/ rail, narrow doorways requiring use of two w/c to navigate between rooms.)   Bathroom Shower/Tub Tub/shower unit   Bathroom Toilet Raised   Bathroom Equipment Tub transfer bench;Grab bars around toilet;Hand-held shower   Bathroom Accessibility Not accessible   Home Equipment Walker;Cane;Wheelchair-manual;Other (Comment);Grab bars;Reacher  (transportation chair)   Additional Comments Lives w/ spouse who is able to assist upon d/c. (-) home alone.   Prior Function   Level of [place] Needs assistance with IADLS   Lives With Spouse   Receives Help From Family   IADLs Family/Friend/Other provides transportation;Independent with medication management;Family/Friend/Other provides meals   Falls in the last 6 months 0   Vocational Retired   Comments PTA, pt reports functioning at non-ambulatory for past 1.5 years. However, independent w/ functional transfers using RW to w/c, supervision w/ stairs w/ wife assistance for w/c setup, independent w/ ADLs and medication management, A w/ IADLs, (-) falls. Lives w/ spouse who is able to assist upon d/c. (-) home alone.   General   Family/Caregiver Present No   Cognition   Overall Cognitive Status WFL   Arousal/Participation Alert   Orientation Level Oriented to person;Oriented to place;Oriented to time;Oriented to situation   Memory Within functional limits   Following Commands Follows one step commands without difficulty   Subjective   Subjective \"I haven't walked in a year and a half.\"   RUE Assessment   RUE Assessment WFL  (for safe mobility and transfers. " Grossly 4+/5 functionally. Reports occasional neuropathies in hands.)   LUE Assessment   LUE Assessment WFL  (for safe mobility and transfers. Grossly 4+/5 functionally. Reports occasional neuropathies in hands.)   RLE Assessment   RLE Assessment X  (LE atrophy noted; (+) Erythema.)   RLE Overall AROM   R Knee Flexion 110   R Knee Extension ~-10   LLE Assessment   LLE Assessment WFL  (hip flx 4+/5, knee ext 5/5, ankle DF 3+/5, ankle PF 4-/5; LE atrophy noted.)   Vision-Basic Assessment   Current Vision   (glasses)   Coordination   Sensation X  (reports chronic neuropathies b/l feet distal to ankles and occasionally b/l hands.)   Bed Mobility   Supine to Sit 6  Modified independent   Additional items Increased time required;LE management;Bedrails  (UE assistance w/ RLE)   Additional Comments (+) dizziness at EOB.   Transfers   Sit to Stand 4  Minimal assistance   Additional items Assist x 1;Increased time required;Verbal cues;Bedrails   Stand to Sit 3  Moderate assistance   Additional items Assist x 1;Increased time required;Verbal cues;Armrests   Stand pivot 4  Minimal assistance   Additional items Assist x 1;Increased time required;Verbal cues;Armrests   Additional Comments RW for stability. Cued for safe technique, hand/LE placement. Dec weight acceptance RLE. (-) dizziness upon initial stand   Ambulation/Elevation   Gait pattern R Knee Robert;Improper Weight shift;Narrow YUMIKO;Antalgic;Decreased foot clearance;Decreased L stance;Step to;Excessively slow;Decreased heel strike;Decreased toe off   Gait Assistance 4  Minimal assist   Additional items Assist x 1   Assistive Device Rolling walker   Distance 3'x1   Ambulation/Elevation Additional Comments (+) dizziness and lightheadedness following seated in chair w/ symptomatic dec in BP. Refer to activity tolerance.   Balance   Static Sitting Fair +   Dynamic Sitting Fair   Static Standing Fair -   Dynamic Standing Poor +  (RW)   Ambulatory Poor +  (RW)   Endurance  Deficit   Endurance Deficit Yes   Endurance Deficit Description pain, fatigue, symptomatic dec in BP   Activity Tolerance   Activity Tolerance (S)  Patient limited by fatigue;Patient limited by pain;Other (Comment);Treatment limited secondary to medical complications (Comment)  (BP supine 115/77 HR 73, /72 (+) dizziness improved w/ time, stand (-) dizziness, seated in chair post stand BP 92/66 (+) dizziness/lightheadedness, tx session: post TE /84. Post stand 83/73 (+) lighthead. seated post exercises 101/71 (-) Sxs)   Medical Staff Made Aware BONI Xiong cleared/updated   Assessment   Prognosis Excellent   Problem List Decreased strength;Decreased range of motion;Impaired balance;Decreased mobility;Decreased endurance;Impaired sensation;Decreased skin integrity;Obesity;Pain   Assessment Pt is a 59 y.o. male y/o presenting to [facility] on 7/15/2024 for surgical management of Post-traumatic osteoarthritis of right knee w/ previous infections, now POD0 S/P R TKA. WBS: WBAT and RLE. Significant pmhx per chart: R TMA (2020), closed fx of medial portion of R tibial plateau w/ malunion s/p ORIF (5/2023) w/ hardware removal (1/2024), HLD, HTN, T2DM w/ peripheral neuropathy, GERD, CKD, depression/anxiety, MANNY, recent admission for swelling  6/2024 s/p I&D. PT consulted to assess strength/functional mobility, activity tolerance and d/c needs. Active PT orders and activity orders for Activity beginning POD0. PTA, pt reports functioning at non-ambulatory for past 1.5 years 2/2 RLE course. However, independent w/ functional transfers using RW to w/c, supervision w/ stairs w/ wife assistance for w/c setup, independent w/ ADLs and medication management, A w/ IADLs, (-) falls. Lives w/ spouse who is able to assist upon d/c. (-) home alone.      During PT IE, pt presenting with above (see flowsheet) outlined functional impairments including dec strength, balance, gait, and deficits that limit functional mobility  and activity tolerance. Pt currently requires mod I for bed mobility, min-mod Ax1 for transfers, min Ax1 for ambulation with RW to achieve OOB to chair. Pt currently demonstrating inc fall risk.  Fall risk education provided with verbal understanding.  Symptomatic dec in BP following mobilization to chair down to 92/66 w/ dizziness/lightheadedness. Inc R knee pain to 7/10 post-activity. Denied additional sxs throughout session. Recommend continued mobilization of pt with nsg staff as tolerated to prevent further decline in function.  Pt will benefit from continued PT services to progress mobility independence necessary for return to PLOF. Based on pt presentation and impairments, pt would most appropriately benefit from level I (max) rehab intensity resources pending progress.  Additional tx session following IE. Assessment below.   Barriers to Discharge Inaccessible home environment;Other (Comment)  (Inc assistance to complete mobility. (+) Stairs)   Goals   Patient Goals Go to acute rehab and get better   STG Expiration Date 07/29/24   Short Term Goal #1 2)  Perform all transfers with Cy demonstrating safe and appropriate technique 100% of the time in order to improve ability to negotiate safely in home environment.3) Amb with least restrictive AD > 50'x1 with supervision in order to demonstrate ability to negotiate in home environment. 4)  Improve overall strength and balance 1/2 grade in order to optimize ability to perform functional tasks and reduce fall risk.5) Increase activity tolerance to 45 minutes in order to improve endurance to functional tasks. 6)  Negotiate >/= 3 stairs using most appropriate technique and S in order to be able to negotiate safely in home environment. 7) PT for ongoing patient and family/caregiver education, DME needs and d/c planning in order to promote highest level of function in least restrictive environment.   PT Treatment Day 0   Plan   Treatment/Interventions Functional  transfer training;LE strengthening/ROM;Elevations;Therapeutic exercise;Endurance training;Patient/family training;Equipment eval/education;Gait training;Compensatory technique education;Continued evaluation;Spoke to nursing;OT   PT Frequency 5-7x/wk;Twice a day  (BID PRN)   Discharge Recommendation   Rehab Resource Intensity Level, PT I (Maximum Resource Intensity)  (pending progress)   Equipment Recommended Walker  (RW owns)   Additional Comments The patient's AM-PAC Basic Mobility Inpatient Short Form Raw Score is 17. A Raw score of greater than 16 suggests the patient may benefit from discharge to home. Please also refer to the recommendation of the Physical Therapist for safe discharge planning.   AM-PAC Basic Mobility Inpatient   Turning in Flat Bed Without Bedrails 4   Lying on Back to Sitting on Edge of Flat Bed Without Bedrails 4   Moving Bed to Chair 3   Standing Up From Chair Using Arms 2   Walk in Room 2   Climb 3-5 Stairs With Railing 2   Basic Mobility Inpatient Raw Score 17   Basic Mobility Standardized Score 39.67   [facility] Highest Level Of Mobility   -HLM Goal 5: Stand one or more mins   -HLM Achieved 5: Stand (1 or more minutes)   Additional Treatment Session   Start Time 1710   End Time 1734   Treatment Assessment Additional tx session following IE. Pt received seated in bedside chair w/ dec BP. Instructed pt on therapeutic exercises/HEP for BP management and educated on strategies w/ positional changes. Fair tolerance to session, however w/ repeated dec in BP upon standing w/ lightheadedness that limited progress towards functional goals, able to stabilize w/ completion of therapeutic exercises. Pt demonstrates acute post-operative deficits that limit functional mobility relative to baseline in addition to chronic deficits from dec ambulatory status from previous injuries. Will continue to benefit from PT per POC. Recommend level I (max) rehab intensity resources pending progress. RN  aware of BP. Pt returned seated in bedside chair, stable w/ c/o 7/10 R knee pain. Discussed potential d/c planning pending progress.   Equipment Use seated TE: ankle pumps, heel slides, A/AAROM LAQ, UE squeezes/bicep curls 2x10 for HEP and BP management, BP up to 113/84 post exercise. Pt requesting to don underwear. Pt able to initiate in seated w/ fair balance, however min Ax1 to stand from bedside chair w/ RW and armrests, assistance w/ completion of donning underwear 2/2 requiring b/l UE support for stability. 10 weight shifts onto RLE, standing tolerance 3-4 minutes. (+) inc dizziness/lightheadeness. Stand to sit RW and w/ mod Ax1, returned to seated w/ BP 83/73 . Completed seated therex w/ BP stabilized 101/71 and sxs resolved. RN aware. Edu: PT goals/POC, involved anatomy/physiology , fall risk , WBS, post-operative precautions , proper use of AD, safe guarding technique with mobility, activity pacing/energy conservation techniques, progressive walking/exercise program, potential negative effects of immobility , modalities for sx management, and positioning for sx management,   Additional Treatment Day 1   End of Consult   Patient Position at End of Consult Bedside chair;Bed/Chair alarm activated;All needs within reach;Other (comment)  (Pt stable. /71. Sxs resolved. No questions or concerns at this time. Rn updated.)       (Please find full objective findings from PT assessment regarding body systems outlined above).     Hx/personal factors: anxiety, depression, step(s) to enter environment, inability to navigate level surfaces without external assistance, and inability to ambulate household distances, co-morbidities, mutliple lines, use of AD, pain, WB restrictions, recent orthopedic procedure , fall risk, and obesity, recent admissions   Examination: assessed/impairments of systems including multiple body structures involved; dec mobility, dec balance, dec endurance, dec amb, risk for falls, pain,  impairements in locomotion, musculoskeletal, balance, endurance, posture, coordination, assessed cognition, JOINT INTEGRITY, skin integrity, AM-PAC score suggesting inc assistance/supervision vs baseline, gait deviations , dec activity tolerance/endurance vs baseline , symptomatic dec in BP  Clinical: unpredictable (ongoing medical status, risk for falls, pain mgt, need for input for mobility technique/safety, and bed/chair alarm, symptomatic dec in BP)  Complexity: high       Edmund Mcmanus, PT,DPT   07/15/24

## 2024-07-15 NOTE — OP NOTE
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    Specimens:  ID Type Source Tests Collected by Time Destination   A : right knee specimen # 1 aerobic Tissue Joint, Right Knee CULTURE, TISSUE AND GRAM STAIN Jorge Hendrix, DO 7/15/2024 1141    B : right knee specimen # 1 fungal Tissue Joint, Right Knee FUNGAL CULTURE Jorge Hendrix, DO 7/15/2024 1142    C : right knee specimen # 1 anaerobic Tissue Joint, Right Knee ANAEROBIC CULTURE AND GRAM STAIN Jorge Hendrix, DO 7/15/2024 1142    D : right knee specimen # 2 aerobic Tissue Joint, Right Knee CULTURE, TISSUE AND GRAM STAIN Jorge Hendrix, DO 7/15/2024 1146    E : right knee specimen # 2 fungal Tissue Joint, Right Knee FUNGAL CULTURE Jorge Hendrix, DO 7/15/2024 1146    F : right knee specimen # 2 anaerobic Tissue Joint, Right Knee ANAEROBIC CULTURE AND GRAM STAIN Jorge Hendrix, DO 7/15/2024 1156    G : right knee specimen # 3 fungal Tissue Joint, Right Knee  FUNGAL CULTURE Jorge Hendrix, DO 7/15/2024 1147    H : right knee specimen # 3 aerobic Tissue Joint, Right Knee CULTURE, TISSUE AND GRAM STAIN Jorge Hendrix, DO 7/15/2024 1149    I : right knee specimen # 3 anaerobic Tissue Joint, Right Knee ANAEROBIC CULTURE AND GRAM STAIN Jorge Hendrix, DO 7/15/2024 1149    J : right knee specimen # 4 fungal Tissue Joint, Right Knee FUNGAL CULTURE Jorge Hendrix, DO 7/15/2024 1150    K : right knee specimen # 4 aerobic Tissue Joint, Right Knee CULTURE, TISSUE AND GRAM STAIN Jorge Hendrix, DO 7/15/2024 1150    L : right knee specimen # 4 anaerobic Tissue Joint, Right Knee ANAEROBIC CULTURE AND GRAM STAIN Jorge Hendrix, DO 7/15/2024 1151    M : right knee specimen # 5 fungal Tissue Joint, Right Knee FUNGAL CULTURE Jorge Hendrix, DO 7/15/2024 1151    N : right knee specimen # 5 aerobic Tissue Joint, Right Knee CULTURE, TISSUE AND GRAM STAIN Jorge Hendrix, DO 7/15/2024 1152    O : right knee specimen # 5 anaerobic Tissue Joint, Right Knee ANAEROBIC CULTURE AND GRAM STAIN Jorge Hendrix, DO 7/15/2024 1152        Estimated Blood Loss:   400 mL    Drains:  Urethral Catheter Non-latex 16 Fr. (Active)   Number of days: 0       Anesthesia Type:   Spinal      Operative Indications:  Post-traumatic osteoarthritis of right knee [M17.31]  Closed fracture of medial portion of right tibial plateau with malunion, subsequent encounter [S82.131P]    Patient is a 59-year-old male who is status post right tibial plateau open reduction internal fixation for severely comminuted proximal tibia fracture with collapse of his construct and removal of hardware then by Dr. Hilario in January of this year.  He has had multiple bouts of infection around his knee since his previous surgery.  Most recently he was seen in the emergency department and an incision and drainage was performed over his lateral plateau incision that is a sinus tract into his  knee.  We have aspirated his knee multiple times which have been culture negative.  Most recently he was on antibiotics at this time of aspiration as unfortunately the emergency department did not send cultures from the fluid they drainage from his lateral plateau incision.  His previous MRI did not show definitive osteomyelitis but his the draining sinus tract is indicative of infection within his knee joint.  We discussed plan for extensive incision and debridement with antibiotic total knee arthroplasty.  We discussed that he is at high risk for surgery in regards to his infection, previous vascular stent in the popliteal artery and there is a risk for potential amputation in the future. We discussed that he will require augmentation/extreme antibiotic spacer construct due to his proximal tibial bone loss. The patient has elected to proceed with Right TKA. Risks and benefits of surgery to include but not limited to bleeding, infection, damage to surrounding structures, hardware failure, instability, fracture, dislocation, need for further surgery, continued pain, stiffness, blood clots, stroke, and heart attack was discussed with the patient.     Operative Findings:  Lateral sinus tract with extension down into joint space  Caseous material within the sinus tract extending from skin into the knee joint  Pre-op ROM 10-90  Post-op ROM 0-120  Massive medial proximal tibial bone loss  Near complete loss of femoral posterior condyles    Implant Name Type Inv. Item Serial No.  Lot No. LRB No. Used Action   CEMENT BONE SIMPLEX P FULL DOSE - [id number]  CEMENT BONE SIMPLEX P FULL DOSE  JOY [employer] [id number] Right 3 Implanted   DEPUY BONE CEMENT CMW2 - [id number]  DEPUY BONE CEMENT CMW2  DEPUY [id number] Right 1 Implanted   COMPONENT FEM SZ 8 RT CMNT CR ATTUNE - [id number]  COMPONENT FEM SZ 8 RT CMNT CR ATTUNE  DEPUY [id number] Right 1 Implanted   KNEE SYSTEM REV POST FEM AUG SZ 8 8MM ATTUNE - [id number]  KNEE  SYSTEM REV POST FEM AUG SZ 8 8MM ATTUNE  DEPUY [id number] Right 1 Implanted   KNEE SYSTEM REV POST FEM AUG SZ 8 12MM ATTUNE - [id number]  KNEE SYSTEM REV POST FEM AUG SZ 8 12MM ATTUNE  DEPUY [id number] Right 1 Implanted   INSERT TIB SZ 7 10MM CR ATTUNE ALPOLY - [id number]  INSERT TIB SZ 7 10MM CR ATTUNE ALPOLY  DEPUY [id number] Right 1 Implanted   COMPONENT PATELLA 38MM MEDIAL DOME ATTUNE - [id number]  COMPONENT PATELLA 38MM MEDIAL DOME ATTUNE  DEPUY [id number] Right 1 Implanted   attune knee system revision tibial augment    DEPUY [id number] Right 1 Implanted     Complications:   None    Knee Technique: Suture (direct) Repair  Knee Approach: Medial Parapatellar    Procedure and Technique:  Patient was seen in the preoperative holding area.  Informed consent was confirmed and all questions were answered. Operative site was confirmed and marked. Patient was taken to the operating room and transferred to the operating room table. Anesthesia was performed as above. The patient was then placed supine and all bony prominences were well-padded. Right lower extremity was prepped and draped in usual sterile fashion with chlorhexidine scrub.  Patient was given perioperative antibiotics prior to incision and SCDs were placed on the non-operative leg.  A formal time-out was performed identifying the patient and confirmed operative site.  We performed the first portion of the operation without a tourniquet due to his history of vascular stent to reduce tourniquet time.  He has both a medial and far lateral knee incisions for his dual completed tibial plateau fixation.  We utilized his medial incision.  We incised this down to the level of his proximal tibia with full thickness flaps.  We created a lateral flap to allow for a medial parapatellar arthrotomy.  We performed a medial parapatellar arthrotomy with cloudy fluid returned within the knee.  We carefully performed a medial peel around the mobile nonunion of the proximal tibia.  We resected  scar from behind the patellar tendon around the lateral knee.    We next evaluated the knee joint.  He had near complete loss of the posterior condyles of the femur.  He had a nonunion of the medial tibial plateau.  His knee had extensive amount of scar tissue.  At this time we performed an extensive scar excision and synovectomy.  We next evaluated his sinus tract extending out towards the lateral aspect of his lateral incision.  There was caseous material and fluid that was purulent within the sinus tract.  We sent 5 soft tissue samples from all around the knee for culture.  At this time we open the distal femur with the intramedullary drill.  We then proceeded with the intramedullary distal femoral cutting guide.  We cut the distal femur at 5 degrees of valgus and 11 mm of distal resection.  We next placed the femoral sizer utilizing the transepicondylar axis as her posterior condyles were not present.  We drilled the pins and sized it to a size 8.  We placed the cutting guide and resected the anterior cut and anterior chamfer posteriorly there was no bone.    At this time we evaluated the tibia.  We carefully placed retractors around the tibia being careful of his neurovascular structures posteriorly.  We removed his nonunion bone of the medial plateau.  We also removed his allogenic bone graft from the proximal tibia.  At this time we used the extramedullary cutting guide and cut the tibia.  He required a 15 mm medial augment due to his bone loss.  We removed the resected tibia.  We next drilled for the all poly tibia stem and punched the tibia to a size 7 which had adequate coverage and restoring medial bone.  We next utilized a cut through the revision femoral trial in order to plan for posterior femoral augments that we will cement onto the CR femur due to his bone loss and need for hand crafted antibiotic cement arthroplasty.  The femur required an 8 mm posterior medial augment and a 12 mm posterior lateral  augment. At this time we trialed the knee and found him to have stability with a 10 mm all poly tibial component with a 15 mm medial tibial augment.    At this time the patella was measured and resected to appropriate depth.  The patella sized to the above size and 3 drill holes were performed.  At this time the knee was again taken through range of motion and found to have excellent stability and excellent patellar tracking.  The trials were floated and rotation of tibia marked.  The femoral lug drills were drilled.    At this time all trials were removed and the knee was copiously irrigated with Irrisept solution this was allowed to sit for 3 minutes.  This was followed by normal saline solution.      At this time a final extensive excisional debridement down to including bone was performed.  Everyone changed their gloves.  At this time the 15 mm medial tibial augment was cemented to the size 7 10 mm all poly tibial component on the back table.  The posterior femoral augments were cemented to the CR femoral component.  We mixed 3 bags of [place] Simplex cement with 6 g of vancomycin and 2.4 g of tobramycin.  We cemented the all poly tibial component and first and cleared all peripheral cement.  We followed this by the femoral component also removing peripheral cement and lastly the patellar component.  We clamped this and held in place until cement cured.  The knee was held in extension until cement cured.  While the cement was curing we irrigated the knee again with Irrisept solution and allowed this to sit within the tissues.  We resected the area of his previous sinus tract for good skin edges in regards to healing purposes.      The joint local was injected in the posterior capsule and around the tissues of the knee.  The knee was taken through a final range of motion and found to have good stability and patellar tracking.  All instrument and sponge counts were correct x2.  The arthrotomy was closed with #1  Stratafix suture.  Subcutaneous tissues were closed with 2-0.  The skin was closed with 2-0 nylon sutures. A sterile Mepilex was placed along with a thigh foot Ace wrap.  Patient was awoken from anesthesia and taken to recovery room in stable condition.      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 and A physician assistant was required during the procedure for retraction, tissue handling, dissection and suturing.    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 This may require acute care beyond the usual and routine recovery period. As such, inpatient admission post-operatively is expected and appropriate, and anticipated hospital length of stay will be >2 midnights.        SIGNATURE: Jorge Hendrix DO  DATE: July 15, 2024  TIME: 1:28 PM

## 2024-07-15 NOTE — ANESTHESIA PROCEDURE NOTES
Spinal Block    Patient location during procedure: OR  Start time: 7/15/2024 10:32 AM  Reason for block: procedure for pain and at surgeon's request  Staffing  Performed by: Jose Willis CRNA  Authorized by: Jonathon Spencer MD    Preanesthetic Checklist  Completed: patient identified, IV checked, site marked, risks and benefits discussed, surgical consent, monitors and equipment checked, pre-op evaluation and timeout performed  Spinal Block  Patient position: sitting  Prep: ChloraPrep and site prepped and draped  Patient monitoring: frequent blood pressure checks, continuous pulse ox and heart rate  Approach: midline  Location: L3-4  Needle  Needle type: Pencan   Needle gauge: 24 G  Needle length: 4 in  Assessment  Sensory level: T4  Injection Assessment:  negative aspiration for heme, no paresthesia on injection and positive aspiration for clear CSF.  Post-procedure:  site cleaned

## 2024-07-16 LAB
ANION GAP SERPL CALCULATED.3IONS-SCNC: 7 MMOL/L (ref 4–13)
BASOPHILS # BLD AUTO: 0.05 THOUSANDS/ÂΜL (ref 0–0.1)
BASOPHILS NFR BLD AUTO: 0 % (ref 0–1)
BUN SERPL-MCNC: 21 MG/DL (ref 5–25)
CALCIUM SERPL-MCNC: 8.2 MG/DL (ref 8.4–10.2)
CHLORIDE SERPL-SCNC: 105 MMOL/L (ref 96–108)
CK SERPL-CCNC: 256 U/L (ref 39–308)
CO2 SERPL-SCNC: 24 MMOL/L (ref 21–32)
CREAT SERPL-MCNC: 0.78 MG/DL (ref 0.6–1.3)
EOSINOPHIL # BLD AUTO: 0.18 THOUSAND/ÂΜL (ref 0–0.61)
EOSINOPHIL NFR BLD AUTO: 2 % (ref 0–6)
ERYTHROCYTE [DISTWIDTH] IN BLOOD BY AUTOMATED COUNT: 13.7 % (ref 11.6–15.1)
GFR SERPL CREATININE-BSD FRML MDRD: 97 ML/MIN/1.73SQ M
GLUCOSE SERPL-MCNC: 102 MG/DL (ref 65–140)
GLUCOSE SERPL-MCNC: 123 MG/DL (ref 65–140)
GLUCOSE SERPL-MCNC: 142 MG/DL (ref 65–140)
GLUCOSE SERPL-MCNC: 163 MG/DL (ref 65–140)
GLUCOSE SERPL-MCNC: 174 MG/DL (ref 65–140)
HCT VFR BLD AUTO: 33.1 % (ref 36.5–49.3)
HGB BLD-MCNC: 11.1 G/DL (ref 12–17)
IMM GRANULOCYTES # BLD AUTO: 0.04 THOUSAND/UL (ref 0–0.2)
IMM GRANULOCYTES NFR BLD AUTO: 0 % (ref 0–2)
LYMPHOCYTES # BLD AUTO: 1.9 THOUSANDS/ÂΜL (ref 0.6–4.47)
LYMPHOCYTES NFR BLD AUTO: 17 % (ref 14–44)
MCH RBC QN AUTO: 30.7 PG (ref 26.8–34.3)
MCHC RBC AUTO-ENTMCNC: 33.5 G/DL (ref 31.4–37.4)
MCV RBC AUTO: 91 FL (ref 82–98)
MONOCYTES # BLD AUTO: 1.64 THOUSAND/ÂΜL (ref 0.17–1.22)
MONOCYTES NFR BLD AUTO: 15 % (ref 4–12)
NEUTROPHILS # BLD AUTO: 7.4 THOUSANDS/ÂΜL (ref 1.85–7.62)
NEUTS SEG NFR BLD AUTO: 66 % (ref 43–75)
NRBC BLD AUTO-RTO: 0 /100 WBCS
PLATELET # BLD AUTO: 185 THOUSANDS/UL (ref 149–390)
PMV BLD AUTO: 12 FL (ref 8.9–12.7)
POTASSIUM SERPL-SCNC: 3.7 MMOL/L (ref 3.5–5.3)
RBC # BLD AUTO: 3.62 MILLION/UL (ref 3.88–5.62)
SODIUM SERPL-SCNC: 136 MMOL/L (ref 135–147)
WBC # BLD AUTO: 11.21 THOUSAND/UL (ref 4.31–10.16)

## 2024-07-16 PROCEDURE — 99024 POSTOP FOLLOW-UP VISIT: CPT | Performed by: STUDENT IN AN ORGANIZED HEALTH CARE EDUCATION/TRAINING PROGRAM

## 2024-07-16 PROCEDURE — 85025 COMPLETE CBC W/AUTO DIFF WBC: CPT | Performed by: INTERNAL MEDICINE

## 2024-07-16 PROCEDURE — 82550 ASSAY OF CK (CPK): CPT | Performed by: INTERNAL MEDICINE

## 2024-07-16 PROCEDURE — 99232 SBSQ HOSP IP/OBS MODERATE 35: CPT | Performed by: INTERNAL MEDICINE

## 2024-07-16 PROCEDURE — 97167 OT EVAL HIGH COMPLEX 60 MIN: CPT

## 2024-07-16 PROCEDURE — 82948 REAGENT STRIP/BLOOD GLUCOSE: CPT

## 2024-07-16 PROCEDURE — 80048 BASIC METABOLIC PNL TOTAL CA: CPT | Performed by: PHYSICIAN ASSISTANT

## 2024-07-16 PROCEDURE — 97530 THERAPEUTIC ACTIVITIES: CPT

## 2024-07-16 PROCEDURE — 97110 THERAPEUTIC EXERCISES: CPT

## 2024-07-16 RX ORDER — INSULIN GLARGINE 100 [IU]/ML
25 INJECTION, SOLUTION SUBCUTANEOUS EVERY 12 HOURS SCHEDULED
Status: DISCONTINUED | OUTPATIENT
Start: 2024-07-16 | End: 2024-07-21 | Stop reason: HOSPADM

## 2024-07-16 RX ADMIN — INSULIN GLARGINE 25 UNITS: 100 INJECTION, SOLUTION SUBCUTANEOUS at 22:06

## 2024-07-16 RX ADMIN — ONDANSETRON 4 MG: 2 INJECTION INTRAMUSCULAR; INTRAVENOUS at 02:22

## 2024-07-16 RX ADMIN — LEVOTHYROXINE SODIUM 175 MCG: 125 TABLET ORAL at 05:59

## 2024-07-16 RX ADMIN — ESCITALOPRAM 5 MG: 5 TABLET, FILM COATED ORAL at 08:59

## 2024-07-16 RX ADMIN — INSULIN GLARGINE 25 UNITS: 100 INJECTION, SOLUTION SUBCUTANEOUS at 08:59

## 2024-07-16 RX ADMIN — ACETAMINOPHEN 975 MG: 325 TABLET, FILM COATED ORAL at 15:50

## 2024-07-16 RX ADMIN — FOLIC ACID 1 MG: 1 TABLET ORAL at 08:58

## 2024-07-16 RX ADMIN — ONDANSETRON 4 MG: 2 INJECTION INTRAMUSCULAR; INTRAVENOUS at 09:37

## 2024-07-16 RX ADMIN — ACETAMINOPHEN 975 MG: 325 TABLET, FILM COATED ORAL at 22:06

## 2024-07-16 RX ADMIN — HYDROMORPHONE HYDROCHLORIDE 0.5 MG: 1 INJECTION, SOLUTION INTRAMUSCULAR; INTRAVENOUS; SUBCUTANEOUS at 18:05

## 2024-07-16 RX ADMIN — HYDROMORPHONE HYDROCHLORIDE 0.5 MG: 1 INJECTION, SOLUTION INTRAMUSCULAR; INTRAVENOUS; SUBCUTANEOUS at 23:19

## 2024-07-16 RX ADMIN — OXYCODONE HYDROCHLORIDE AND ACETAMINOPHEN 500 MG: 500 TABLET ORAL at 17:01

## 2024-07-16 RX ADMIN — Medication 1 TABLET: at 08:58

## 2024-07-16 RX ADMIN — INSULIN LISPRO 1 UNITS: 100 INJECTION, SOLUTION INTRAVENOUS; SUBCUTANEOUS at 11:35

## 2024-07-16 RX ADMIN — ATORVASTATIN CALCIUM 40 MG: 40 TABLET, FILM COATED ORAL at 15:51

## 2024-07-16 RX ADMIN — ASPIRIN 81 MG: 81 TABLET, COATED ORAL at 08:59

## 2024-07-16 RX ADMIN — ACETAMINOPHEN 975 MG: 325 TABLET, FILM COATED ORAL at 06:00

## 2024-07-16 RX ADMIN — ONDANSETRON 4 MG: 2 INJECTION INTRAMUSCULAR; INTRAVENOUS at 18:06

## 2024-07-16 RX ADMIN — VERAPAMIL HYDROCHLORIDE 240 MG: 120 TABLET, FILM COATED, EXTENDED RELEASE ORAL at 23:19

## 2024-07-16 RX ADMIN — ASPIRIN 81 MG: 81 TABLET, COATED ORAL at 17:01

## 2024-07-16 RX ADMIN — OXYCODONE HYDROCHLORIDE AND ACETAMINOPHEN 500 MG: 500 TABLET ORAL at 08:58

## 2024-07-16 RX ADMIN — DAPTOMYCIN 575 MG: 500 INJECTION, POWDER, LYOPHILIZED, FOR SOLUTION INTRAVENOUS at 17:06

## 2024-07-16 NOTE — PLAN OF CARE
Problem: Prexisting or High Potential for Compromised Skin Integrity  Goal: Skin integrity is maintained or improved  Description: INTERVENTIONS:  - Identify patients at risk for skin breakdown  - Assess and monitor skin integrity  - Assess and monitor nutrition and hydration status  - Monitor labs   - Assess for incontinence   - Turn and reposition patient  - Assist with mobility/ambulation  - Relieve pressure over bony prominences  - Avoid friction and shearing  - Provide appropriate hygiene as needed including keeping skin clean and dry  - Evaluate need for skin moisturizer/barrier cream  - Collaborate with interdisciplinary team   - Patient/family teaching  - Consider wound care consult   Outcome: Progressing     Problem: PAIN - ADULT  Goal: Verbalizes/displays adequate comfort level or baseline comfort level  Description: Interventions:  - Encourage patient to monitor pain and request assistance  - Assess pain using appropriate pain scale  - Administer analgesics based on type and severity of pain and evaluate response  - Implement non-pharmacological measures as appropriate and evaluate response  - Consider cultural and social influences on pain and pain management  - Notify physician/advanced practitioner if interventions unsuccessful or patient reports new pain  Outcome: Progressing     Problem: GASTROINTESTINAL - ADULT  Goal: Maintains adequate nutritional intake  Description: INTERVENTIONS:  - Monitor percentage of each meal consumed  - Identify factors contributing to decreased intake, treat as appropriate  - Assist with meals as needed  - Monitor I&O, weight, and lab values if indicated  - Obtain nutrition services referral as needed  Outcome: Progressing     Problem: SKIN/TISSUE INTEGRITY - ADULT  Goal: Incision(s), wounds(s) or drain site(s) healing without S/S of infection  Description: INTERVENTIONS  - Assess and document dressing, incision, wound bed, drain sites and surrounding tissue  - Provide  patient and family education  Outcome: Progressing

## 2024-07-16 NOTE — ARC ADMISSION
ARC  contacted the patient and or family: introduced self, explained role, reviewed ARC program, services offered, acute rehab criteria, approval process, insurance authorization process, [facility] locations and preferences. Patient / family preferred ARC location is [place] and second option is [place]. Patient / family made aware ARC Reviewer will keep their Care Manager updated regarding referral status.      Thank you,  Magaly Ramirez  ARC Admissions

## 2024-07-16 NOTE — PHYSICAL THERAPY NOTE
Physical Therapy Treatment Note     07/16/24 0944   PT Last Visit   PT Visit Date 07/16/24   Pain Assessment   Pain Assessment Tool 0-10   Pain Score 8   Pain Location/Orientation Orientation: Right;Location: Knee;Location: Leg   Restrictions/Precautions   Weight Bearing Precautions Per Order Yes   RLE Weight Bearing Per Order WBAT   Other Precautions Fall Risk;Pain;WBS;Bed Alarm  (Orthostatic hypotension)   General   Chart Reviewed Yes   Subjective   Subjective Pt agreeable to PT. Reported he is nauseous and reported he was vomitting.   Bed Mobility   Supine to Sit 5  Supervision   Additional items HOB elevated;Bedrails;Increased time required   Sit to Supine 5  Supervision   Additional items Increased time required;Bedrails   Transfers   Sit to Stand   (SBA)   Additional items Assist x 2;Increased time required;Verbal cues   Stand to Sit   (SBA)   Additional items Assist x 2;Increased time required;Verbal cues   Balance   Static Sitting Normal   Dynamic Sitting Good   Static Standing Fair   Endurance Deficit   Endurance Deficit Yes   Endurance Deficit Description Orthostatic hypotension   Activity Tolerance   Activity Tolerance Patient tolerated treatment well;Treatment limited secondary to medical complications (Comment)   Nurse Made Aware Yes   Assessment   Prognosis Excellent   Problem List Decreased strength;Decreased range of motion;Decreased endurance;Decreased mobility;Pain;Orthopedic restrictions   Assessment Pt. reported he was nauseous and was throwing up and did not sleep well after he took the pain meds. Pt. needed no physical A for any tansfers. pt. noted to be with orthostatic hypotension and dizziness reported with position changes. BP reading in supine 143/78, seated at /65 but got error msg with standing and initial sitting after standing. However BP later in sitting was 117/68. Pt. reported dizziness t/o the transfers and reported slightly better after sitting for a while. Pt. postioned  back in bed with calf/ankle elevated and recommended pt. have his knees in extension and HOB elevated of the bed. RN and ortho aware of the BP variation. Did not attempt ambulation due to pt. being symtomatic and orthostatic. Will continue to follow per PT POC.   Barriers to Discharge None   Goals   Patient Goals None reproted   STG Expiration Date 07/29/24   PT Treatment Day 1   Plan   Treatment/Interventions Functional transfer training;Spoke to nursing;Spoke to advanced practitioner;Bed mobility;Equipment eval/education;Patient/family training   Progress Slow progress, medical status limitations   PT Frequency Twice a day;5-7x/wk   Discharge Recommendation   Rehab Resource Intensity Level, PT I (Maximum Resource Intensity)   Equipment Recommended Walker   AM-PAC Basic Mobility Inpatient   Turning in Flat Bed Without Bedrails 4   Lying on Back to Sitting on Edge of Flat Bed Without Bedrails 4   Moving Bed to Chair 3   Standing Up From Chair Using Arms 4   Walk in Room 3   Climb 3-5 Stairs With Railing 3   Basic Mobility Inpatient Raw Score 21   Basic Mobility Standardized Score 45.55   [facility] Highest Level Of Mobility   -HLM Goal 6: Walk 10 steps or more   -HLM Achieved 3: Sit at edge of bed   End of Consult   Patient Position at End of Consult All needs within reach;Supine;Bed/Chair alarm activated         Alexandra Mora PTA    An AM-PAC basic mobility standardized score less than 42.9 suggest the patient may benefit from discharge to post-acute rehab services.

## 2024-07-16 NOTE — PLAN OF CARE
Problem: OCCUPATIONAL THERAPY ADULT  Goal: Performs self-care activities at highest level of function for planned discharge setting.  See evaluation for individualized goals.  Description: Treatment Interventions: ADL retraining, Functional transfer training, UE strengthening/ROM, Endurance training, Patient/family training, Equipment evaluation/education, Neuromuscular reeducation, Compensatory technique education, Energy conservation, Activityengagement          See flowsheet documentation for full assessment, interventions and recommendations.   Note: Limitation: Decreased ADL status, Decreased UE strength, Decreased Safe judgement during ADL, Decreased endurance, Decreased self-care trans, Decreased high-level ADLs  Prognosis: Good  Assessment: Aaron Richards is a 59 y.o. male seen for OT evaluation s/p admit to [facility] on 7/15/2024 w/ Post-traumatic osteoarthritis of right knee. s/p R antibiotic cement TKA with Dr. 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  Comorbidities affecting pt's functional performance at time of assessment include:  R TMA (2020), closed fx of medial portion of R tibial plateau w/ malunion s/p ORIF (5/2023) w/ hardware removal (1/2024), HLD, HTN, T2DM w/ peripheral neuropathy, GERD, CKD, depression/anxiety, MANNY . Pt with active OT orders and activity orders for Up and OOB as tolerated. Personal factors affecting pt at time of IE include:DANIEL home environment, difficulty performing ADLs, difficulty performing IADLs, and difficulty performing transfers/mobility. Prior to admission, pt lives with wife in a single level home. Was I at a WC level with mobility, ADLS and shares IADLS. 0 falls. Upon evaluation: Pt currently requires supervision for UB ADLs, modA for LB ADLs, La for toileting, supervision for bed mobility, supervision from elevated surfaces for functional transfers, and HUMA mobility 2* the following deficits impacting occupational  performance:weakness, decreased strength , decreased balance, and decreased activity tolerance. VITALS during session: (+) orthostatic  during session, symptomatic with c/o dizziness and nausea. Supine 143/78> seated 103/65> unable to get reading in standing or seated back EOB  initially. After ~5-7 min 117/68 seated.   Pt to benefit from continued skilled OT tx while in the hospital to address deficits as defined above and maximize level of functional independence w ADL's and functional mobility. Occupational Performance areas to address include: grooming, bathing/shower, toilet hygiene, dressing, functional mobility, community mobility, and clothing management. From OT standpoint, recommendation at time of d/c would be level 1 resources ( pending continued progress and resolution of orthostatic symptoms). OT to follow pt on caseload 3-5x/wk.     Rehab Resource Intensity Level, OT: I (Maximum Resource Intensity) (pending continued progress and resolution of orthostatic symptoms)

## 2024-07-16 NOTE — PROGRESS NOTES
PHYSICAL MEDICINE AND REHABILITATION   PREADMISSION ASSESSMENT     Projected IGC and Rehabilitation Diagnoses:  Impairment of mobility, safety and Activities of Daily Living (ADLs) due to Orthopedic Disorders:  08.61  Unilateral Knee Replacement  Etiologic: s/p R antibiotic cement TKA with Dr. 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 #   Marital Status: Single  Ethnicity: White  Employment Status: retired  Extended Emergency Contact Information  Primary Emergency Contact: Dottie Delong   [place]  Mobile Phone: [phone]  Relation: Spouse  Advance Directive: Level 1 Full Code (no ACP docs)    INSURANCE/COVERAGE:     Primary Payor: MEDICARE / Plan: MEDICARE A AND B / Product Type: Medicare A & B Fee for Service /   Secondary Payer:[employer] Plan [id number]  ID# [id number]   Payer Contact:  Payer Contact:   Contact Phone:  Contact Phone:       MEDICARE #: [id number]  Medicare Days: 60/30/60  Medical Record #: [id number]    REFERRAL SOURCE:   Referring provider: Jorge Hendrix DO  Referring facility: [facility]   Room: [facility] /E2 -*  PCP: Oswaldo Prather MD PCP phone number: [phone]    MEDICAL INFORMATION  HPI: Pt is a 59 year old male with PMH of diabetes mellitus, chronic kidney disease, traumatic right tibial plateau fracture. The patient has sustained a comminuted proximal tibial fracture and underwent open reduction internal fixation in May 2023.  However he suffered from collapse of his construct and required removal of hardware in January 2024.  He apparently had multiple bouts of what was felt to be superficial infections around the knee and had been treated with courses of antibiotics. He was  planned to go undergo right total knee arthroplasty for repair however he was seen in the emergency department approximately 2 weeks ago when he developed what was felt to be a cyst involving the lateral aspect of the right knee.  In the emergency department they did an I&D of the cyst and sent the patient home on Keflex.  He was seen back in the orthopedic office and noted to have a draining sinus tract going into the joint space and at that time he underwent aspiration with Synovasure which was negative.  His antibiotics were discontinued and now he is admitted for definitive orthopedic intervention.      Right knee septic arthritis.  In the setting of a previous traumatic injury with hardware failure requiring hardware removal and now has the development of a sinus tract extending into the joint space with operative findings consistent with a septic arthritis.  He has been off antibiotics since 7/2/2024.  Although previous cultures have been negative they were obtained while he has been on antibiotics.  Pt is s/p R antibiotic cement TKA with Dr. 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 WBAT RLE.  ID consulted to assist with antibiotic management of a septic arthritis with previous hardware in place. Continue daptomycin 6 mg/kg IV every 24 hours through 8/26/2024 to complete 6 weeks total treatment. PICC line placed on 7/19.     PT and OT have been consulted and are recommending post-acute rehab services.   Patient's case has been reviewed with [facility] medical director, patient meets medical criteria for acute rehab and has demonstrated the ability to tolerate three or more hours of therapy per day. Patient is medically stable and ready for discharge to [facility].        Past Medical History:   Past Surgical History:   Allergies:     Past Medical History:   Diagnosis Date    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    Chronic kidney disease     Colon polyp     Constipation  03/09/2023    Diabetes mellitus (HCC)     Disease of thyroid gland     GERD (gastroesophageal reflux disease)     HHS vs DKA 11/16/2018    Hyperlipidemia     Hypertension     Thyroid cancer (HCC)     Past Surgical History:   Procedure Laterality Date    COLONOSCOPY      FOOT AMPUTATION Right     FOOT SURGERY Right 2014    IR AORTAGRAM WITH RUN-OFF  08/06/2020    IR TUNNELED CENTRAL LINE PLACEMENT  09/08/2020    IR TUNNELED CENTRAL LINE REMOVAL  09/28/2020    TX AMPUTATION FOOT TRANSMETARSAL Right 09/03/2020    Procedure: AMPUTATION TRANSMETATARSAL (TMA);  Surgeon: Tracy Murillo DPM;  Location: [facility];  Service: Podiatry    TX ARTHRP KNE CONDYLE&PLATU MEDIAL&LAT COMPARTMENTS Right 7/15/2024    Procedure: ARTHROPLASTY KNEE TOTAL;  Surgeon: Jorge Hendrix DO;  Location: [facility];  Service: Orthopedics    TX OPEN TX TIBIAL FRACTURE PROXIMAL UNICONDYLAR Right 5/1/2023    Procedure: OPEN REDUCTION W/ INTERNAL FIXATION (ORIF) RIGHT TIBIAL PLATEAU NONUNION;  Surgeon: Sesar Hilario MD;  Location[facility];  Service: Orthopedics    TX REMOVAL IMPLANT DEEP Right 1/22/2024    Procedure: REMOVAL HARDWARE TIBIA;  Surgeon: Sesar Hilario MD;  [facility];  Service: Orthopedics    TX THYROIDECTOMY TOTAL/COMPLETE N/A 02/03/2021    Procedure: TOTAL THYROIDECTOMY WITH NIMS MONITORING;  Surgeon: Sesar Garvey MD;  Location: [facility];  Service: ENT    TOE AMPUTATION Right 08/11/2020    Procedure: AMPUTATION TOE;  Surgeon: Tracy Murillo DPM;  [facility];  Service: Podiatry    US GUIDED THYROID BIOPSY  07/02/2020     Allergies   Allergen Reactions    Vancomycin Other (See Comments)     Kidney issues    Pollen Extract Eye Swelling     Eyes get watery          Medical/functional conditions requiring inpatient rehabilitation: R TKA, impaired self care and mobility, decreased strength/endurance    Risk for medical/clinical complications: Risk for falls, Risk for skin breakdown secondary to  decreased mobility, Risk for infection, Risk for uncontrolled pain    Comorbidities  Type 2 diabetes with neuropathy  Hypertension  Hypercholesterolemia  Peripheral vascular disease  Status post right TMA   History of thyroid cancer with surgical hypothyroidism  Poor dentition   Alcoholism, currently abstinent    Surgeries in the last 100 days: s/p R antibiotic cement TKA with Dr. 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    CURRENT VITAL SIGNS:   Temp:  [97.9 °F (36.6 °C)-99 °F (37.2 °C)] 97.9 °F (36.6 °C)  HR:  [91-95] 95  Resp:  [18] 18  BP: (117-156)/(71-83) 140/83   Intake/Output Summary (Last 24 hours) at 7/21/2024 1019  Last data filed at 7/21/2024 0900  Gross per 24 hour   Intake 240 ml   Output 800 ml   Net -560 ml        LABORATORY RESULTS:      Lab Results   Component Value Date    HGB 9.4 (L) 07/18/2024    HCT 28.7 (L) 07/18/2024    WBC 12.40 (H) 07/18/2024     Lab Results   Component Value Date    BUN 22 07/17/2024    BUN 22 (H) 06/22/2018    K 3.6 07/17/2024    K 5.1 06/22/2018     07/17/2024     06/22/2018    GLUCOSE 109 05/01/2023    CREATININE 0.71 07/17/2024    CREATININE 0.8 06/22/2018     Lab Results   Component Value Date    PROTIME 13.9 07/01/2024    INR 1.08 07/01/2024        DIAGNOSTIC STUDIES:  XR knee right 1 or 2 views    Result Date: 7/16/2024  Impression: Postoperative changes. Right knee arthroplasty. Workstation performed: [id number]       PRECAUTIONS/SPECIAL NEEDS:  Weight Bearing Precautions:  WBAT R LE, Anticoagulation:  aspirin 81 mg orally every day, Blood Sugar Management: Per MD orders, Edema Management, Safety Concerns, Pain Management, and Language Preference: English, Fall precautions    MEDICATIONS:     Current Facility-Administered Medications:     acetaminophen (TYLENOL) tablet 650 mg, 650 mg, Oral, Q4H PRN, Dionne Kuhn PA-C, 650 mg at 07/20/24 2120    acetaminophen (TYLENOL) tablet 975 mg, 975 mg, Oral, Q8H, Dionne  Nichol Kuhn PA-C, 975 mg at 07/21/24 0601    ascorbic acid (VITAMIN C) tablet 500 mg, 500 mg, Oral, BID, Dionne Kuhn PA-C, 500 mg at 07/21/24 0919    aspirin (ECOTRIN LOW STRENGTH) EC tablet 81 mg, 81 mg, Oral, BID, Dionne Kuhn PA-C, 81 mg at 07/21/24 0919    atorvastatin (LIPITOR) tablet 40 mg, 40 mg, Oral, Daily With Dinner, Dionne Kuhn PA-C, 40 mg at 07/20/24 1738    DAPTOmycin (CUBICIN) 575 mg in sodium chloride 0.9 % 50 mL IVPB, 6 mg/kg (Adjusted), Intravenous, Q24H, Av Juarez MD    docusate sodium (COLACE) capsule 100 mg, 100 mg, Oral, BID, Dionne Kuhn PA-C, 100 mg at 07/17/24 1204    escitalopram (LEXAPRO) tablet 5 mg, 5 mg, Oral, Daily, Dionne Kuhn PA-C, 5 mg at 07/21/24 0921    folic acid (FOLVITE) tablet 1 mg, 1 mg, Oral, Daily, Dionne Kuhn PA-C, 1 mg at 07/21/24 0920    gabapentin (NEURONTIN) capsule 300 mg, 300 mg, Oral, HS, Dionne Kuhn PA-C    HYDROmorphone (DILAUDID) injection 0.5 mg, 0.5 mg, Intravenous, Q2H PRN, Jorge Hendrix DO    HYDROmorphone (DILAUDID) tablet 2 mg, 2 mg, Oral, Q4H PRN, Clay Zavala MD, 2 mg at 07/20/24 2120    HYDROmorphone (DILAUDID) tablet 4 mg, 4 mg, Oral, Q4H PRN, Clay Zavala MD, 4 mg at 07/18/24 2020    insulin glargine (LANTUS) subcutaneous injection 25 Units 0.25 mL, 25 Units, Subcutaneous, Q12H ETSS, Clay Zavala MD, 25 Units at 07/21/24 0920    insulin lispro (HumALOG/ADMELOG) 100 units/mL subcutaneous injection 1-6 Units, 1-6 Units, Subcutaneous, TID AC, 1 Units at 07/20/24 1739 **AND** Fingerstick Glucose (POCT), , , TID AC, Dionne Kuhn PA-C    levothyroxine tablet 175 mcg, 175 mcg, Oral, Early Morning, Dionne Kuhn PA-C, 175 mcg at 07/21/24 0601    losartan (COZAAR) tablet 25 mg, 25 mg, Oral, Daily, Velia Molina MD, 25 mg at 07/21/24 0919    multivitamin-minerals (CENTRUM) tablet 1 tablet, 1 tablet, Oral, Daily, Dionne Kuhn PA-C, 1 tablet at 07/21/24 0920    ondansetron (ZOFRAN) injection 4 mg, 4 mg,  Intravenous, Q6H PRN, Dionne Kuhn PA-C, 4 mg at 07/19/24 0832    pantoprazole (PROTONIX) EC tablet 40 mg, 40 mg, Oral, Every Other Day, Dionne Kuhn PA-C, 40 mg at 07/21/24 0920    senna (SENOKOT) tablet 8.6 mg, 1 tablet, Oral, Daily, Dionne Kuhn PA-C, 8.6 mg at 07/20/24 0855    thiamine tablet 100 mg, 100 mg, Oral, Daily, Velia Molina MD, 100 mg at 07/21/24 0920    zolpidem (AMBIEN) tablet 10 mg, 10 mg, Oral, HS PRN, Dionne Kuhn PA-C    SKIN INTEGRITY:   Right knee surgical site    PRIOR LEVEL OF FUNCTION:  He lives in a(n) single family home  Aaron Richards is  and lives with their spouse.  Self Care: Independent, Indoor Mobility: Independent with W/C, Stairs (in/outdoor): Needed some help, and Cognition: Independent    FALLS IN THE LAST 6 MONTHS: 0    HOME ENVIRONMENT:  The living area: can live on one level  There are 3 steps to enter the home.    The patient will have 24 hour supervision/physical assistance available upon discharge.    PREVIOUS DME:  Equipment in home (previous DME): Tub Bench, Grab Bars, Rolling Walker, Manual Wheelchair, and Single Point Cane    FUNCTIONAL STATUS:  Physical Therapy Occupational Therapy Speech Therapy   07/19/24 1416    Note Type   Note Type Treatment   Pain Assessment   Pain Assessment Tool 0-10   Pain Score 7   Pain Location/Orientation Orientation: Right;Location: Knee  (AND ANTERIOR THIGH)   Hospital Pain Intervention(s) Repositioned;Ambulation/increased activity;Emotional support;Rest   Restrictions/Precautions   RLE Weight Bearing Per Order WBAT   Other Precautions WBS;Pain;Fall Risk   General   Chart Reviewed Yes   Family/Caregiver Present No   Cognition   Overall Cognitive Status WFL   Subjective   Subjective MY DAY DID NOT START OUT THAT GREAT.   Bed Mobility   Supine to Sit 6  Modified independent   Additional items HOB elevated;Bedrails;Increased time required   Transfers   Sit to Stand 4  Minimal assistance   Additional items Assist x  1;Armrests;Increased time required;Verbal cues   Stand to Sit 4  Minimal assistance   Additional items Assist x 1;Armrests;Increased time required   Additional Comments INCREASED TIME TO PERFORM AND COMPLETE FROM RECLINER.   Ambulation/Elevation   Gait pattern Forward Flexion;Antalgic;Decreased R stance   Gait Assistance 4  Minimal assist   Additional items Assist x 1;Verbal cues   Assistive Device Rolling walker   Distance 10' X1, 25' X1 WITH CHAIR FOLLOW   Ambulation/Elevation Additional Comments (+) DIZZINESS WITH DROP IN BP  FROM 155/ 79 SITTING / 64 STANDING.   Endurance Deficit   Endurance Deficit Description (+) DIZZINESS IN STANDING  WITH DROP IN BP  FROM 155/79 SITTNG /64 STANDING  BP POST AMBULATION 113/91   Activity Tolerance   Activity Tolerance Treatment limited secondary to medical complications (Comment);Patient limited by fatigue  (HYPOTENSION  (+0 DIZZINESS)   Medical Staff Made Aware VALERIA, otr   Exercises   TKR Sitting;Supine;AAROM;AROM;Right  (X 12 REPS.  AP, LAQ, HIP FLEXION, SAQ,  A-AASLR, QS,)   Equipment Use   Comments BLOODY DRAINAGE NOTED FROM INCISION WITH WEIGHBEARING ACTIVITY, REINFORECED ACE WRAP DRESSING, PT'S rn MADE AWARE.       07/19/24 1432    OT Last Visit   OT Visit Date 07/19/24   Note Type   Note Type Treatment   Pain Assessment   Pain Assessment Tool 0-10   Pain Score 7   Pain Location/Orientation Orientation: Right;Location: Knee;Location: Incision   Restrictions/Precautions   Weight Bearing Precautions Per Order Yes   RLE Weight Bearing Per Order WBAT   Other Precautions WBS;Pain;Fall Risk  (monitor BP)   ADL   UB Dressing Assistance 6  Modified independent   UB Dressing Deficit Setup   LB Dressing Assistance 5  Supervision/Setup   LB Dressing Deficit Setup;Don/doff R sock;Don/doff R shoe   Functional Standing Tolerance   Time 2min   Activity self care tasks, mobility. static standing.   Comments RW for support.   Transfers   Sit to Stand 4  Minimal assistance    Additional items Assist x 1;Armrests;Increased time required;Verbal cues   Stand to Sit 4  Minimal assistance   Additional items Assist x 1;Increased time required;Verbal cues   Additional Comments cues for safety, hand placement and best tech.   Functional Mobility   Functional Mobility    (CGA- La)   Additional Comments RW, short functional distances.   Additional items Rolling walker   Cognition   Overall Cognitive Status WFL   Arousal/Participation Cooperative;Responsive;Alert   Attention Within functional limits   Orientation Level Oriented X4   Memory Within functional limits   Following Commands Follows all commands and directions without difficulty   Activity Tolerance   Activity Tolerance Patient limited by pain   Medical Staff Made Aware PTA BONI Barney   Assessment   Assessment Pt seen for skilled OT tx this date. Tx focused on improving strength, activity tolerance and balance, safety awareness to increase independence with self care tasks. Pt tolerated session well. Pt was limited by pain and orthostasis. Pt performed UB dressing/ bathing with Cy, LB dressing / bathing supervision for socks and shoes, transfers  La, mobility with RW La with chair follow. Pt demonstrated poor+ standing balance during functional tasks.Pt demonstrated ability to safely and appropriately attend to all tasks during session. Pt required minimal verbal cuing during session to safely complete tasks. Vitals: seated /79> standing 101/64, post mobility seated 123/91.  Current OT DC recommendations for pt is level 1 resources.         CARE SCORES:  Self Care:  Eatin: Independent  Oral hygiene: 06: Independent  Toilet hygiene: 03: Partial/moderate assistance  Shower/bathing self: 03: Partial/moderate assistance  Upper body dressin: Supervision or touching  assistance  Lower body dressin: Supervision or touching  assistance  Putting on/taking off footwear: 04: Supervision or touching   assistance  Transfers:  Roll left and right: 06: Independent  Sit to lyin: Independent  Lying to sitting on side of bed: 06: Independent  Sit to stand: 03: Partial/moderate assistance  Chair/bed to chair transfer: 03: Partial/moderate assistance  Toilet transfer: 03: Partial/moderate assistance  Mobility:  Walk 10 ft: 03: Partial/moderate assistance  Walk 50 ft with two turns: 88: Not attempted due to medical conditions or safety concerns  Walk 150ft: 88: Not attempted due to medical conditions or safety concerns    CURRENT GAP IN FUNCTION  Prior to Admission: Mod I mob, Patient has been competing only SPT with RW to and from w/c for the past 1.5 yrs. S level w/ stairs/assist from spouse, I w/ ADLS, Needs A w/ IADLS    Expected functional outcomes: It is expected that with skilled acute rehabilitation services the patient will progress to Independent for self care and Independent for mobility     Estimated length of stay: 10 to 14 days    Anticipated Post-Discharge Disposition/Treatment  Disposition: Return to previous home/apartment.  Outpatient Services: Physical Therapy (PT) and Occupational Therapy (OT)    BARRIERS TO DISCHARGE  Weakness, Pain, Balance Difficulty, Dizziness, Fatigue, Caregiver Accessibility, and Equipment Needs    INTERVENTIONS FOR DISCHARGE  Adaptive equipment, Patient/Family/Caregiver Education, Community Resources, Arrange DME needs, Therapy exercises, and Energy conservation education     REQUIRED THERAPY:  Patient will require PT and OT 90 minutes each per day, five days per week to achieve rehab goals.     REQUIRED FUNCTIONAL AND MEDICAL MANAGEMENT FOR INPATIENT REHABILITATION:  Skin:  Monitor skin for breakdown, Pain Management: Overall pain is moderately controlled, Deep Vein Thrombosis (DVT) Prophylaxis:  Per MD orders, Diabetes Management: continue sliding scale insulin, patient to do finger sticks as ordered, SLIM to continue to manage diabetes,  further internal medicine  management of additional medical conditions while on ARC, PT/OT intervention, patient/family education and training, and any needed consults PRN.    RECOMMENDED LEVEL OF CARE:    Pt is a 59 year old male with PMH of diabetes mellitus, chronic kidney disease, traumatic right tibial plateau fracture.  Right knee septic arthritis.  In the setting of a previous traumatic injury with hardware failure requiring hardware removal and now has the development of a sinus tract extending into the joint space with operative findings consistent with a septic arthritis.  He has been off antibiotics since 7/2/2024.  Although previous cultures have been negative they were obtained while he has been on antibiotics.  Pt is s/p R antibiotic cement TKA with Dr. 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 WBAT RLE.  ID consulted to assist with antibiotic management of a septic arthritis with previous hardware in place.  Continue daptomycin 6 mg/kg IV every 24 hours through 8/26/2024 to complete 6 weeks total treatment. PICC line placed on 7/19.     Pt was Mod I mob, Patient has been competing only SPT with RW to and from w/[employer] for the past 1.5 yrs. S level w/ stairs/assist from spouse, I w/ ADLS, Needs A w/ IADLS. Pt lives with spouse in a [place] home with 3 DANIEL. Pt is currently functioning below baseline needing Min A for ADLs and Min A for transfers/amb. Pt would benefit from ARC admission to have close medical management while participating in 3 hours of therapy per day that will include physical and occupational therapy. Physical and occupational therapists will address functional mobility deficits and assist patient in improving their strength, endurance, ROM, and self care. Pt will have 24/7 nursing care to monitor routine vitals, blood sugars, I/Os, skin integrity, and overall condition. The rehab nursing staff will follow therapy recommendations to have 24 hour follow through during non-therapy  hours. PM&R to maximize function and provide medical oversight. The MD will monitor patient co-morbidities while on the unit as well as order any additional tests, labs, and consults needed. The [facility] specialized interdisciplinary team will meet weekly to discuss patient overall medical status and rehab goals in preparation for D/C home. Inpatient acute rehab is recommended for patient to maximize overall strength, endurance, self care, and mobility for a safe and timely transition back home.

## 2024-07-16 NOTE — PLAN OF CARE
Problem: PHYSICAL THERAPY ADULT  Goal: Performs mobility at highest level of function for planned discharge setting.  See evaluation for individualized goals.  Description: Treatment/Interventions: Functional transfer training, LE strengthening/ROM, Elevations, Therapeutic exercise, Endurance training, Patient/family training, Equipment eval/education, Gait training, Compensatory technique education, Continued evaluation, Spoke to nursing, OT  Equipment Recommended: Walker (RW owns)       See flowsheet documentation for full assessment, interventions and recommendations.  Outcome: Not Progressing  Note: Prognosis: Excellent  Problem List: Decreased strength, Decreased range of motion, Decreased endurance, Decreased mobility, Pain, Orthopedic restrictions  Assessment: Pt. reported he was nauseous and was throwing up and did not sleep well after he took the pain meds. Pt. needed no physical A for any tansfers. pt. noted to be with orthostatic hypotension and dizziness reported with position changes. BP reading in supine 143/78, seated at /65 but got error msg with standing and initial sitting after standing. However BP later in sitting was 117/68. Pt. reported dizziness t/o the transfers and reported slightly better after sitting for a while. Pt. postioned back in bed with calf/ankle elevated and recommended pt. have his knees in extension and HOB elevated of the bed. RN and ortho aware of the BP variation. Did not attempt ambulation due to pt. being symtomatic and orthostatic. Will continue to follow per PT POC.  Barriers to Discharge: None     Rehab Resource Intensity Level, PT: I (Maximum Resource Intensity)    See flowsheet documentation for full assessment.

## 2024-07-16 NOTE — CASE MANAGEMENT
Case Management Assessment & Discharge Planning Note    Patient name Aaron Richards  Location [facility] 2 /E2 -* MRN [id number]  : 1965 Date 2024       Current Admission Date: 7/15/2024  Current Admission Diagnosis:Post-traumatic osteoarthritis of right knee   Patient Active Problem List    Diagnosis Date Noted Date Diagnosed    Chronic pain of right knee 07/10/2024     Diabetes mellitus type 2, insulin dependent ([facility]) 07/10/2024     Post-traumatic osteoarthritis of right knee 2024     S/P hardware removal 2023     MANNY (obstructive sleep apnea) 2023     Constipation 2023     Depression and anxiety 2023     Acquired genu varum of right lower extremity 2023     MARKELL (acute kidney injury) ([facility]) 2023     Chronic kidney disease-mineral and bone disorder 2023     Closed fracture of right tibial plateau 2023     Stage 3b chronic kidney disease ([facility]) 11/15/2022     Hyperkalemia 11/15/2022     Alcoholism ([facility]) 11/15/2022     Dupuytren's contracture of right hand 2022     Type 2 diabetes mellitus with diabetic peripheral angiopathy without gangrene ([facility]) 2022     Acquired hypothyroidism 10/18/2021     Acquired absence of right foot ([facility]) 2021     Persistent proteinuria 2021     Abnormal EKG 2020     Insomnia 2020     Urinary retention 2020     Thyroid cancer ([facility]) 2020     PAD (peripheral artery disease) ([facility]) 2020     Elevated liver enzymes 10/24/2019     Colon polyps 2019     Right-sided tinnitus 2018     Hypertension, essential 2018     Gastroesophageal reflux disease without esophagitis 2018     Hyperlipidemia 2009     History of nonadherence to medical treatment 2008       LOS (days): 1  Geometric Mean LOS (GMLOS) (days):   Days to GMLOS:     OBJECTIVE:    Risk of Unplanned Readmission Score: 16.68         Current admission status: Inpatient        Preferred Pharmacy:   [id number] - [place], [place] - [address] [place] [id number]  Phone: [phone] Fax: [phone]    [facility] - [place], [place] - [address]  [address] [address]  Phone: [phone] Fax: [phone]    Primary Care Provider: Oswaldo Prather MD    Primary Insurance: MEDICARE  Secondary Insurance: BLUE CROSS    ASSESSMENT:  Active Health Care Proxies       Dottie Delong Health Care Representative - Spouse   Primary Phone: [phone] (Mobile)                 Advance Directives  Does patient have a Health Care POA?: No  Was patient offered paperwork?: Yes (declined)  Does patient currently have a Health Care decision maker?: Yes, please see Health Care Proxy section  Does patient have Advance Directives?: No  Was patient offered paperwork?: Yes (declined)  Primary Contact: Dottie Delong (spouse) [phone]              Patient Information  Admitted from:: Home  Mental Status: Alert  During Assessment patient was accompanied by: Not accompanied during assessment  Assessment information provided by:: Patient  Primary Caregiver: Spouse  Caregiver's Name:: Dottie  Caregiver's Relationship to Patient:: Family Member  Support Systems: Spouse/significant other  County of Residence: [place]  What [facility] do you live in?: [place]  Home entry access options. Select all that apply.: Stairs  Number of steps to enter home.: 3  Do the steps have railings?: Yes  Type of Current Residence: [facility]  Living Arrangements: Lives w/ Spouse/significant other    Activities of Daily Living Prior to Admission  Functional Status: Independent  Completes ADLs independently?: No  Level of ADL dependence: Assistance (Pt requires assistance with ADLs which wife is able to perform)  Ambulates independently?: No  Level of ambulatory dependence: Assistance  Does patient use assisted devices?: Yes  Assisted Devices (DME) used:  Wheelchair, Walker, Other (Comment) (transport chair; grab bars at shower and tub; handheld shower; tub transfer bench)  Does patient currently own DME?: Yes  What DME does the patient currently own?: Wheelchair, Walker, Straight Cane, Other (Comment) (transport chair; grab bars at shower and tub; handheld shower; tub transfer bench)  Does patient have a history of Outpatient Therapy (PT/OT)?: Yes  Does the patient have a history of Short-Term Rehab?: Yes ([facility]; rehab in [place])  Does patient have a history of HHC?: Yes  Does patient currently have HHC?: No         Patient Information Continued  Income Source: Pension/prison  Does patient have prescription coverage?: Yes  Does patient receive dialysis treatments?: No  Does patient have a history of substance abuse?: No  Historical substance use preference: Alcohol/ETOH  Is patient currently in treatment for substance abuse?: N/A - sober  Does patient have a history of Mental Health Diagnosis?: Yes (Depression and anxiety)  Is patient receiving treatment for mental health?: Yes  Has patient received inpatient treatment related to mental health in the last 2 years?: No         Means of Transportation  Means of Transport to Appts:: Family transport      Social Determinants of Health (SDOH)      Flowsheet Row Most Recent Value   Housing Stability    In the last 12 months, was there a time when you were not able to pay the mortgage or rent on time? N   In the past 12 months, how many times have you moved where you were living? 0   At any time in the past 12 months, were you homeless or living in a shelter (including now)? N   Transportation Needs    In the past 12 months, has lack of transportation kept you from medical appointments or from getting medications? no   In the past 12 months, has lack of transportation kept you from meetings, work, or from getting things needed for daily living? No   Food Insecurity    Within the past 12 months, you worried that your food  would run out before you got the money to buy more. Never true   Within the past 12 months, the food you bought just didn't last and you didn't have money to get more. Never true   Utilities    In the past 12 months has the electric, gas, oil, or water company threatened to shut off services in your home? No            DISCHARGE DETAILS:    Discharge planning discussed with:: Patient  Freedom of Choice: Yes  Comments - Freedom of Choice: Pt would like to go to rehab at [facility]. Referral placed. Pt pre-approved pending medical clearance  CM contacted family/caregiver?: No- see comments (declined need)  Were Treatment Team discharge recommendations reviewed with patient/caregiver?: Yes  Did patient/caregiver verbalize understanding of patient care needs?: N/A- going to facility            Requested Home Health Care         Is the patient interested in HHC at discharge?: No    DME Referral Provided  Referral made for DME?: No    Other Referral/Resources/Interventions Provided:  Interventions: Acute Rehab  Referral Comments: Referral placed via aidin         Treatment Team Recommendation: Acute Rehab

## 2024-07-16 NOTE — PROGRESS NOTES
Progress Note - Orthopedics   Aaron Richards 59 y.o. male MRN: [id number]  Unit/Bed#: E2 -01      Assessment:  59 y.o.male POD1 s/p 59M s/p R antibiotic cement TKA with Dr. 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      Plan:  WBAT RLE  multi-modal pain control  DVT ppx: aspirin 81mg BID x 4 weeks  PT/OT - ROM as tolerated, pillow/blankets under achilles not behind knee while in bed  f/u I/O cultures - prelim gram stain shows rare polys, no bacteria  Abx per ID - Dapto  Follow up with Dr. Hendrix in 10-14 days  Dispo: Ortho will follow      Subjective:    59 y.o.male Seen and examined at bedside. Patient states he was nauseous overnight which he attributes to his pain medication. Patient states no acute events or complaints overnight. Denies fevers, chills, CP, SOB, N/V, numbness or tingling.     Labs:  0   Lab Value Date/Time    HCT 42.4 07/01/2024 1151    HCT 43.8 01/15/2024 1015    HCT 40.9 10/19/2023 0957    HGB 13.9 07/01/2024 1151    HGB 14.4 01/15/2024 1015    HGB 13.4 10/19/2023 0957    INR 1.08 07/01/2024 1151    WBC 9.78 07/01/2024 1151    WBC 7.55 01/15/2024 1015    WBC 8.18 10/19/2023 0957    ESR 24 (H) 02/21/2020 1101       Meds:    Current Facility-Administered Medications:     acetaminophen (TYLENOL) tablet 650 mg, 650 mg, Oral, Q4H PRN, Dionne Kuhn PA-C    acetaminophen (TYLENOL) tablet 975 mg, 975 mg, Oral, Q8H, Dionne Kuhn PA-C, 975 mg at 07/15/24 1600    ascorbic acid (VITAMIN C) tablet 500 mg, 500 mg, Oral, BID, Dionne Kuhn PA-C, 500 mg at 07/15/24 1751    aspirin (ECOTRIN LOW STRENGTH) EC tablet 81 mg, 81 mg, Oral, BID, Dionne Kuhn PA-C    atorvastatin (LIPITOR) tablet 40 mg, 40 mg, Oral, Daily With Dinner, Dionne Kuhn PA-C, 40 mg at 07/15/24 1602    calcium carbonate (TUMS) chewable tablet 1,000 mg, 1,000 mg, Oral, Daily PRN, Dionne Kuhn PA-C    DAPTOmycin (CUBICIN) 575 mg in sodium chloride 0.9 % 50 mL IVPB, 6 mg/kg  (Adjusted), Intravenous, Q24H, Av Juarez MD, Last Rate: 100 mL/hr at 07/15/24 1735, 575 mg at 07/15/24 1735    docusate sodium (COLACE) capsule 100 mg, 100 mg, Oral, BID, Dionne Kuhn PA-C    escitalopram (LEXAPRO) tablet 5 mg, 5 mg, Oral, Daily, Dionne Kuhn PA-C, 5 mg at 07/15/24 1602    folic acid (FOLVITE) tablet 1 mg, 1 mg, Oral, Daily, Dionne Kuhn PA-C, 1 mg at 07/15/24 1602    gabapentin (NEURONTIN) capsule 300 mg, 300 mg, Oral, HS, Dionne Kuhn PA-C    HYDROmorphone (DILAUDID) injection 0.5 mg, 0.5 mg, Intravenous, Q2H PRN, Jorge Hendrix DO, 0.5 mg at 07/15/24 1933    insulin glargine (LANTUS) subcutaneous injection 30 Units 0.3 mL, 30 Units, Subcutaneous, Q12H TESS, Clay Zavala MD    insulin lispro (HumALOG/ADMELOG) 100 units/mL subcutaneous injection 1-6 Units, 1-6 Units, Subcutaneous, TID AC, 1 Units at 07/15/24 1603 **AND** Fingerstick Glucose (POCT), , , TID AC, Dionne Kuhn PA-C    lactated ringers bolus 1,000 mL, 1,000 mL, Intravenous, Once PRN **AND** lactated ringers bolus 1,000 mL, 1,000 mL, Intravenous, Once PRN, Dionne Kuhn PA-C    lactated ringers infusion, 125 mL/hr, Intravenous, Continuous, Dionne Kuhn PA-C, Stopped at 07/15/24 1618    [START ON 7/16/2024] levothyroxine tablet 175 mcg, 175 mcg, Oral, Early Morning, Dionne Kuhn PA-C    multivitamin-minerals (CENTRUM) tablet 1 tablet, 1 tablet, Oral, Daily, Dionne Kuhn PA-C, 1 tablet at 07/15/24 1602    ondansetron (ZOFRAN) injection 4 mg, 4 mg, Intravenous, Q6H PRN, Dionne Kuhn PA-C    oxyCODONE (ROXICODONE) immediate release tablet 10 mg, 10 mg, Oral, Q4H PRN, Dionne Kuhn PA-C    oxyCODONE (ROXICODONE) IR tablet 5 mg, 5 mg, Oral, Q4H PRN, Dionne Kuhn PA-C, 5 mg at 07/15/24 1742    pantoprazole (PROTONIX) EC tablet 40 mg, 40 mg, Oral, Every Other Day, Dionne Kuhn PA-C, 40 mg at 07/15/24 1601    senna (SENOKOT) tablet 8.6 mg, 1 tablet, Oral, Daily, Dionne Kuhn PA-C     simethicone (MYLICON) chewable tablet 80 mg, 80 mg, Oral, 4x Daily PRN, Dionne Kuhn PA-C    sodium chloride 0.9 % bolus 1,000 mL, 1,000 mL, Intravenous, Once PRN **AND** sodium chloride 0.9 % bolus 1,000 mL, 1,000 mL, Intravenous, Once PRN, Dionne Kuhn PA-C    verapamil (CALAN-SR) CR tablet 240 mg, 240 mg, Oral, HS, Dionne Kuhn PA-C    zolpidem (AMBIEN) tablet 10 mg, 10 mg, Oral, HS PRN, Dionne Kuhn PA-C    Blood Culture:   Lab Results   Component Value Date    BLOODCX No Growth After 5 Days. 10/10/2020    BLOODCX No Growth After 5 Days. 10/10/2020       Wound Culture:   Lab Results   Component Value Date    WOUNDCULT 2+ Growth of Morganella morganii (A) 08/30/2020    WOUNDCULT (A) 08/30/2020     2+ Growth of Methicillin Resistant Staphylococcus aureus    WOUNDCULT 1+ Growth of Enterobacter cloacae complex (A) 08/30/2020    WOUNDCULT 1+ Growth of Enterococcus faecalis (A) 08/30/2020       Ins and Outs:  I/O last 24 hours:  In: 3740 [P.O.:240; I.V.:3500]  Out: 765 [Urine:365; Blood:400]        Physical:  Vitals:    07/15/24 1918   BP: 137/82   Pulse: 72   Resp: 16   Temp: (!) 96.8 °F (36 °C)   SpO2: 99%         Musculoskeletal: right Lower Extremity  Skin -  No erythema or ecchymosis. Compartments soft and compressible.   Dressing - c/d/I, covered with mepilex  TTP at incision site  Sensation intact to saphenous, sural, tibial, superficial peroneal nerve, and deep peroneal  Motor intact to, +ankle dorsi/plantar flexion  Foot warm and well perfused  Unable to assess capillary refill due to absence of all digits to Right foot        Sesar Pruett PA-C

## 2024-07-16 NOTE — PLAN OF CARE
Problem: PHYSICAL THERAPY ADULT  Goal: Performs mobility at highest level of function for planned discharge setting.  See evaluation for individualized goals.  Description: Treatment/Interventions: Functional transfer training, LE strengthening/ROM, Elevations, Therapeutic exercise, Endurance training, Patient/family training, Equipment eval/education, Gait training, Compensatory technique education, Continued evaluation, Spoke to nursing, OT  Equipment Recommended: Walker (RW owns)       See flowsheet documentation for full assessment, interventions and recommendations.  7/16/2024 1638 by Alexandra Mora PTA  Outcome: Not Progressing  Note: Prognosis: Excellent  Problem List: Decreased strength, Decreased range of motion, Decreased endurance, Decreased mobility, Pain, Orthopedic restrictions  Assessment: Pt. continues to be with orthostatic hypotension with mobility. RN was informed of the same. BP readings in supine 125/73 and seated 87/59 and once back in bed 92/51. Pt. reported c/o dizziness as he sat longer and chnaged positions. Session cut short due to the same. Pt. continued to feel dizzy even while in bed. Will continue to follow per PT POC. Bed alarm engaged and all needs within reach at the end of the session.  Barriers to Discharge: None     Rehab Resource Intensity Level, PT: I (Maximum Resource Intensity)    See flowsheet documentation for full assessment.     7/16/2024 1205 by Alexandra Mora PTA  Outcome: Not Progressing  Note: Prognosis: Excellent  Problem List: Decreased strength, Decreased range of motion, Decreased endurance, Decreased mobility, Pain, Orthopedic restrictions  Assessment: Pt. reported he was nauseous and was throwing up and did not sleep well after he took the pain meds. Pt. needed no physical A for any tansfers. pt. noted to be with orthostatic hypotension and dizziness reported with position changes. BP reading in supine 143/78, seated at /65 but got error msg with  standing and initial sitting after standing. However BP later in sitting was 117/68. Pt. reported dizziness t/o the transfers and reported slightly better after sitting for a while. Pt. postioned back in bed with calf/ankle elevated and recommended pt. have his knees in extension and HOB elevated of the bed. RN and ortho aware of the BP variation. Did not attempt ambulation due to pt. being symtomatic and orthostatic. Will continue to follow per PT POC.  Barriers to Discharge: None     Rehab Resource Intensity Level, PT: I (Maximum Resource Intensity)    See flowsheet documentation for full assessment.

## 2024-07-16 NOTE — PHYSICAL THERAPY NOTE
Physical Therapy Treatment Note     07/16/24 1411   PT Last Visit   PT Visit Date 07/16/24   Note Type   Note Type BID visit/treatment   Pain Assessment   Pain Assessment Tool 0-10   Pain Score 9   Pain Location/Orientation Orientation: Right;Location: Knee   Restrictions/Precautions   Weight Bearing Precautions Per Order Yes   RLE Weight Bearing Per Order WBAT   Other Precautions WBS;Fall Risk;Pain  (Orthostatic hypotension)   General   Chart Reviewed Yes   Subjective   Subjective Pt. agreeable to PT   Bed Mobility   Supine to Sit 5  Supervision   Additional items Bedrails;Increased time required   Sit to Supine 5  Supervision   Additional items Bedrails;Increased time required   Balance   Static Sitting Normal   Dynamic Sitting Good   Endurance Deficit   Endurance Deficit Yes   Endurance Deficit Description + dizziness and orthostatic hypotension   Activity Tolerance   Activity Tolerance Treatment limited secondary to medical complications (Comment)   Nurse Made Aware Yes   Exercises   TKR Sitting;Left;AROM;20 reps   Assessment   Prognosis Excellent   Problem List Decreased strength;Decreased range of motion;Decreased endurance;Decreased mobility;Pain;Orthopedic restrictions   Assessment Pt. continues to be with orthostatic hypotension with mobility. RN was informed of the same. BP readings in supine 125/73 and seated 87/59 and once back in bed 92/51. Pt. reported c/o dizziness as he sat longer and chnaged positions. Session cut short due to the same. Pt. continued to feel dizzy even while in bed. Will continue to follow per PT POC. Bed alarm engaged and all needs within reach at the end of the session.   Barriers to Discharge None   Goals   Patient Goals None reproted   STG Expiration Date 07/29/24   PT Treatment Day 2   Plan   Treatment/Interventions Functional transfer training;LE strengthening/ROM;Therapeutic exercise;Spoke to nursing;Bed mobility;Patient/family training   Progress Slow progress, medical  status limitations   PT Frequency 5-7x/wk;Twice a day   Discharge Recommendation   Rehab Resource Intensity Level, PT I (Maximum Resource Intensity)   Equipment Recommended Walker   AM-PAC Basic Mobility Inpatient   Turning in Flat Bed Without Bedrails 4   Lying on Back to Sitting on Edge of Flat Bed Without Bedrails 4   Moving Bed to Chair 3   Standing Up From Chair Using Arms 3   Walk in Room 3   Climb 3-5 Stairs With Railing 2   Basic Mobility Inpatient Raw Score 19   Basic Mobility Standardized Score 42.48   [facility] Highest Level Of Mobility   -[facility] Goal 6: Walk 10 steps or more   -HLM Achieved 3: Sit at edge of bed   End of Consult   Patient Position at End of Consult All needs within reach;Supine;Bed/Chair alarm activated           Alexandra Mora PTA    An AM-PAC basic mobility standardized score less than 42.9 suggest the patient may benefit from discharge to post-acute rehab services.

## 2024-07-16 NOTE — PROGRESS NOTES
Progress Note - Aaron Richards 59 y.o. male MRN: [id number]    Unit/Bed#: E2 -01 Encounter: [id number]      Subjective:  The patient does not feel great.  He has some leg pain though he is getting improvement with his current analgesics.  He has some nausea and vomited once.  He did not sleep well.  He has no chest pain or shortness of breath.    Physical Exam:   Temp:  [96.7 °F (35.9 °C)-97.2 °F (36.2 °C)] 97.2 °F (36.2 °C)  HR:  [72-88] 88  Resp:  [16-18] 18  BP: (103-148)/(65-93) 121/83    Gen: Well-developed, well-nourished, in no distress.  Neck: Supple.  No lymphadenopathy, goiter, or bruit.  Heart: Regular rhythm.  I heard no murmur, gallop, or rub.  Lungs: Clear to auscultation and percussion.  No wheezing, rales, or rhonchi.  Abd: Soft with active bowel sounds.  Mass, tenderness, organomegaly.  Extremities: No clubbing, cyanosis, or edema.  Surgical dressing is dry.  Right TMA is noted.  Neuro: Alert and oriented.  No focal sign.  Skin: Warm and dry.      LABS:   CBC:   Lab Results   Component Value Date    WBC 11.21 (H) 07/16/2024    HGB 11.1 (L) 07/16/2024    HCT 33.1 (L) 07/16/2024    MCV 91 07/16/2024     07/16/2024    RBC 3.62 (L) 07/16/2024    MCH 30.7 07/16/2024    MCHC 33.5 07/16/2024    RDW 13.7 07/16/2024    MPV 12.0 07/16/2024    NRBC 0 07/16/2024   , CMP:   Lab Results   Component Value Date    SODIUM 136 07/16/2024    K 3.7 07/16/2024     07/16/2024    CO2 24 07/16/2024    BUN 21 07/16/2024    CREATININE 0.78 07/16/2024    CALCIUM 8.2 (L) 07/16/2024    EGFR 97 07/16/2024           Assessment/Plan:  1.  Status post complex total knee replacement, right  2.  Type 2 diabetes with neuropathy  3.  Hypertension  4.  Hypercholesterolemia  5.  Peripheral vascular disease  6.  Status post right leg TMA  7.  History of thyroid cancer with surgical hypothyroidism  8.  Poor dentition  9.  Alcoholism, currently abstinent    The patient is doing generally well considering the magnitude  of his surgery.  His blood pressure is stable.  His glucose is on the lower side.  We will adjust his insulin downward.  We will continue to monitor his blood pressure and glucose carefully.    VTE Pharmacologic Prophylaxis: Aspirin  VTE Mechanical Prophylaxis: sequential compression device

## 2024-07-16 NOTE — OCCUPATIONAL THERAPY NOTE
Occupational Therapy Evaluation     Patient Name: Aaron Richards  Today's Date: 7/16/2024  Problem List  Principal Problem:    Post-traumatic osteoarthritis of right knee  Active Problems:    Hypertension, essential    Hyperlipidemia    PAD (peripheral artery disease) (HCC)    Diabetes mellitus type 2, insulin dependent (HCC)    Past Medical History  Past Medical History:   Diagnosis Date    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    Chronic kidney disease     Colon polyp     Constipation 03/09/2023    Diabetes mellitus (HCC)     Disease of thyroid gland     GERD (gastroesophageal reflux disease)     HHS vs DKA 11/16/2018    Hyperlipidemia     Hypertension     Thyroid cancer (HCC)      Past Surgical History  Past Surgical History:   Procedure Laterality Date    COLONOSCOPY      FOOT AMPUTATION Right     FOOT SURGERY Right 2014    IR AORTAGRAM WITH RUN-OFF  08/06/2020    IR TUNNELED CENTRAL LINE PLACEMENT  09/08/2020    IR TUNNELED CENTRAL LINE REMOVAL  09/28/2020    IL AMPUTATION FOOT TRANSMETARSAL Right 09/03/2020    Procedure: AMPUTATION TRANSMETATARSAL (TMA);  Surgeon: Tracy Murillo DPM;  Location: [facility];  Service: Podiatry    IL ARTHRP KNE CONDYLE&PLATU MEDIAL&LAT COMPARTMENTS Right 7/15/2024    Procedure: ARTHROPLASTY KNEE TOTAL;  Surgeon: Jorge Hendrix DO;  Location[facility];  Service: Orthopedics    IL OPEN TX TIBIAL FRACTURE PROXIMAL UNICONDYLAR Right 5/1/2023    Procedure: OPEN REDUCTION W/ INTERNAL FIXATION (ORIF) RIGHT TIBIAL PLATEAU NONUNION;  Surgeon: Sesar Hilario MD;  [facility];  Service: Orthopedics    IL REMOVAL IMPLANT DEEP Right 1/22/2024    Procedure: REMOVAL HARDWARE TIBIA;  Surgeon: Sesar Hilario MD;  [facility];  Service: Orthopedics    IL THYROIDECTOMY TOTAL/COMPLETE N/A 02/03/2021    Procedure: TOTAL THYROIDECTOMY WITH NIMS MONITORING;  Surgeon: Sesar Garvey MD;  [facility]: [facility];  Service: ENT    TOE AMPUTATION Right  08/11/2020    Procedure: AMPUTATION TOE;  Surgeon: Tracy Murillo DPM;  [facility];  Service: Podiatry    US GUIDED THYROID BIOPSY  07/02/2020 07/16/24 0925   OT Last Visit   OT Visit Date 07/16/24   Note Type   Note type Evaluation   Additional Comments greeted seated EOB.   Pain Assessment   Pain Assessment Tool FLACC   Pain Rating: FLACC (Rest) - Face 0   Pain Rating: FLACC (Rest) - Legs 0   Pain Rating: FLACC (Rest) - Activity 0   Pain Rating: FLACC (Rest) - Cry 0   Pain Rating: FLACC (Rest) - Consolability 0   Score: FLACC (Rest) 0   Pain Rating: FLACC (Activity) - Face 1   Pain Rating: FLACC (Activity) - Legs 0   Pain Rating: FLACC (Activity) - Activity 0   Pain Rating: FLACC (Activity) - Cry 1   Pain Rating: FLACC (Activity) - Consolability 0   Score: FLACC (Activity) 2   Restrictions/Precautions   Weight Bearing Precautions Per Order Yes   RLE Weight Bearing Per Order WBAT   Other Precautions Fall Risk;Pain  ((+) orthostatic, monitor BP)   Home Living   Type of Home House   Home Layout One level;Work area in basement  (3 DANIEL)   Bathroom Shower/Tub Tub/shower unit   Bathroom Toilet Raised   Bathroom Equipment Tub transfer bench;Grab bars around toilet;Hand-held shower   Home Equipment Walker;Cane;Wheelchair-manual;Grab bars;Reacher  (transport chair)   Prior Function   Level of [place] Independent with ADLs;Modified independent with wheelchair;Needs assistance with IADLS   Lives With Spouse   Receives Help From Family   IADLs Family/Friend/Other provides transportation;Independent with medication management;Family/Friend/Other provides meals   Falls in the last 6 months 0   Vocational Retired   Comments pt reports functioning independently at non-ambulatory level for past 1.5 years.   ADL   Where Assessed Edge of bed   Eating Assistance 7  Independent   Grooming Assistance 7  Independent   UB Bathing Assistance 5  Supervision/Setup   LB Bathing Assistance 4  Minimal Assistance   UB  Dressing Assistance 5  Supervision/Setup   LB Dressing Assistance 3  Moderate Assistance   Toileting Assistance  4  Minimal Assistance   Bed Mobility   Supine to Sit 6  Modified independent   Additional items HOB elevated;Bedrails;Increased time required   Sit to Supine 5  Supervision   Additional items Increased time required;Verbal cues   Transfers   Sit to Stand 5  Supervision   Additional items Verbal cues;Increased time required  (from elevated EOB)   Stand to Sit 5  Supervision   Additional items Bed elevated;Increased time required;Verbal cues   Additional Comments VITALS during session: (+) orthostatic  during session, symptomatic with c/o dizziness and nausea. Supine 143/78> seated 103/65> unable to get reading in standing or seated back EOB  initially. After ~5-7 min 117/68 seated.   Functional Mobility   Additional Comments HUMA due to orthostasis   Balance   Static Sitting Normal   Dynamic Sitting Good   Static Standing Fair   Activity Tolerance   Activity Tolerance Patient limited by fatigue;Treatment limited secondary to medical complications (Comment)  (orthostatic)   Medical Staff Made Aware PTA   Nurse Made Aware RN   RUE Assessment   RUE Assessment WFL   LUE Assessment   LUE Assessment WFL   Hand Function   Gross Motor Coordination Functional   Fine Motor Coordination Functional   Psychosocial   Psychosocial (WDL) WDL   Cognition   Overall Cognitive Status WFL   Arousal/Participation Cooperative   Attention Within functional limits   Orientation Level Oriented X4   Memory Within functional limits   Following Commands Follows all commands and directions without difficulty   Assessment   Limitation Decreased ADL status;Decreased UE strength;Decreased Safe judgement during ADL;Decreased endurance;Decreased self-care trans;Decreased high-level ADLs   Prognosis Good   Assessment Aaron Richards is a 59 y.o. male seen for OT evaluation s/p admit to [facility] on 7/15/2024 w/ Post-traumatic osteoarthritis of  right knee. s/p R antibiotic cement TKA with Dr. 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  Comorbidities affecting pt's functional performance at time of assessment include:  R TMA (2020), closed fx of medial portion of R tibial plateau w/ malunion s/p ORIF (5/2023) w/ hardware removal (1/2024), HLD, HTN, T2DM w/ peripheral neuropathy, GERD, CKD, depression/anxiety, MANNY . Pt with active OT orders and activity orders for Up and OOB as tolerated. Personal factors affecting pt at time of IE include:DANIEL home environment, difficulty performing ADLs, difficulty performing IADLs, and difficulty performing transfers/mobility. Prior to admission, pt lives with wife in a single level home. Was I at a  level with mobility, ADLS and shares IADLS. 0 falls. Upon evaluation: Pt currently requires supervision for UB ADLs, modA for LB ADLs, La for toileting, supervision for bed mobility, supervision from elevated surfaces for functional transfers, and HUMA mobility 2* the following deficits impacting occupational performance:weakness, decreased strength , decreased balance, and decreased activity tolerance. VITALS during session: (+) orthostatic  during session, symptomatic with c/o dizziness and nausea. Supine 143/78> seated 103/65> unable to get reading in standing or seated back EOB  initially. After ~5-7 min 117/68 seated.   Pt to benefit from continued skilled OT tx while in the hospital to address deficits as defined above and maximize level of functional independence w ADL's and functional mobility. Occupational Performance areas to address include: grooming, bathing/shower, toilet hygiene, dressing, functional mobility, community mobility, and clothing management. From OT standpoint, recommendation at time of d/c would be level 1 resources ( pending continued progress and resolution of orthostatic symptoms). OT to follow pt on caseload 3-5x/wk.   Goals   STG Time Frame 3-5   Short  Term Goal #1 Pt will improve activity tolerance to G for min 30 min txment sessions for increase engagement in functional tasks   Short Term Goal #2 Pt will complete LB dressing/self care w/ mod I using adaptive device and DME as needed   Short Term Goal  Pt will complete toileting w/ mod I w/ G hygiene/thoroughness using DME as needed   LTG Time Frame 10-14   Long Term Goal #1 Pt will improve functional transfers to Mod I on/off all surfaces using DME as needed w/ G balance/safety   Long Term Goal #2 Pt will improve functional mobility during ADL/IADL/leisure tasks to Mod I using DME as needed w/ G balance/safety   Long Term Goal Pt will demonstrate G carryover of pt/caregiver education and training as appropriate w/o cues w/ good tolerance to increase safety during functional tasks   Plan   Treatment Interventions ADL retraining;Functional transfer training;UE strengthening/ROM;Endurance training;Patient/family training;Equipment evaluation/education;Neuromuscular reeducation;Compensatory technique education;Energy conservation;Activityengagement   Goal Expiration Date 07/30/24   OT Treatment Day 0   OT Frequency 3-5x/wk   Discharge Recommendation   Rehab Resource Intensity Level, OT I (Maximum Resource Intensity)  (pending continued progress and resolution of orthostatic symptoms)   Additional Comments  The patient's raw score on the AM-PAC Daily Activity Inpatient Short Form is 19. A raw score of greater than or equal to 19 suggests the patient may benefit from discharge to home. Please refer to the recommendation of the Occupational Therapist for safe discharge planning.   AM-PAC Daily Activity Inpatient   Lower Body Dressing 2   Bathing 3   Toileting 3   Upper Body Dressing 3   Grooming 4   Eating 4   Daily Activity Raw Score 19   Daily Activity Standardized Score (Calc for Raw Score >=11) 40.22   AM-PAC Applied Cognition Inpatient   Following a Speech/Presentation 4   Understanding Ordinary Conversation 4    Taking Medications 4   Remembering Where Things Are Placed or Put Away 4   Remembering List of 4-5 Errands 4   Taking Care of Complicated Tasks 4   Applied Cognition Raw Score 24   Applied Cognition Standardized Score 62.21   Rosanne Rios, OT

## 2024-07-16 NOTE — PLAN OF CARE
Problem: Prexisting or High Potential for Compromised Skin Integrity  Goal: Skin integrity is maintained or improved  Description: INTERVENTIONS:  - Identify patients at risk for skin breakdown  - Assess and monitor skin integrity  - Assess and monitor nutrition and hydration status  - Monitor labs   - Assess for incontinence   - Turn and reposition patient  - Assist with mobility/ambulation  - Relieve pressure over bony prominences  - Avoid friction and shearing  - Provide appropriate hygiene as needed including keeping skin clean and dry  - Evaluate need for skin moisturizer/barrier cream  - Collaborate with interdisciplinary team   - Patient/family teaching  - Consider wound care consult   Outcome: Progressing     Problem: PAIN - ADULT  Goal: Verbalizes/displays adequate comfort level or baseline comfort level  Description: Interventions:  - Encourage patient to monitor pain and request assistance  - Assess pain using appropriate pain scale  - Administer analgesics based on type and severity of pain and evaluate response  - Implement non-pharmacological measures as appropriate and evaluate response  - Consider cultural and social influences on pain and pain management  - Notify physician/advanced practitioner if interventions unsuccessful or patient reports new pain  Outcome: Progressing     Problem: SAFETY ADULT  Goal: Patient will remain free of falls  Description: INTERVENTIONS:  - Educate patient/family on patient safety including physical limitations  - Instruct patient to call for assistance with activity   - Consult OT/PT to assist with strengthening/mobility   - Keep Call bell within reach  - Keep bed low and locked with side rails adjusted as appropriate  - Keep care items and personal belongings within reach  - Initiate and maintain comfort rounds  - Make Fall Risk Sign visible to staff  - Offer Toileting every 2 Hours, in advance of need  - Initiate/Maintain bed alarm  - Obtain necessary fall risk  management equipment: walker  - Apply yellow socks and bracelet for high fall risk patients  - Consider moving patient to room near nurses station  Outcome: Progressing     Problem: Knowledge Deficit  Goal: Patient/family/caregiver demonstrates understanding of disease process, treatment plan, medications, and discharge instructions  Description: Complete learning assessment and assess knowledge base.  Interventions:  - Provide teaching at level of understanding  - Provide teaching via preferred learning methods  Outcome: Progressing     Problem: GENITOURINARY - ADULT  Goal: Maintains or returns to baseline urinary function  Description: INTERVENTIONS:  - Assess urinary function  - Encourage oral fluids to ensure adequate hydration if ordered  - Administer IV fluids as ordered to ensure adequate hydration  - Administer ordered medications as needed  - Offer frequent toileting  - Follow urinary retention protocol if ordered  Outcome: Progressing     Problem: METABOLIC, FLUID AND ELECTROLYTES - ADULT  Goal: Electrolytes maintained within normal limits  Description: INTERVENTIONS:  - Monitor labs and assess patient for signs and symptoms of electrolyte imbalances  - Administer electrolyte replacement as ordered  - Monitor response to electrolyte replacements, including repeat lab results as appropriate  - Instruct patient on fluid and nutrition as appropriate  Outcome: Progressing  Goal: Fluid balance maintained  Description: INTERVENTIONS:  - Monitor labs   - Monitor I/O and WT  - Instruct patient on fluid and nutrition as appropriate  - Assess for signs & symptoms of volume excess or deficit  Outcome: Progressing  Goal: Glucose maintained within target range  Description: INTERVENTIONS:  - Monitor Blood Glucose as ordered  - Assess for signs and symptoms of hyperglycemia and hypoglycemia  - Administer ordered medications to maintain glucose within target range  - Assess nutritional intake and initiate nutrition  service referral as needed  Outcome: Progressing

## 2024-07-16 NOTE — PROGRESS NOTES
Progress Note - Infectious Disease   Aaron Richards 59 y.o. male MRN: [id number]  Unit/Bed#: E2 -01 Encounter: [id number]    Impression/Plan:  1. R knee septic arthritis.  In the setting of previous traumatic injury with hardware failure requiring hardware removal. Has has now developed a sinus tract extending into the joint space. Patient with many previous cultures although they were all negative as they were obtained while he was on antibiotics.  He has now been off antibiotics since 7/2/2024. Yesterday he underwent I&D, and placement of a spacer on 7/15/2024. Operative findings consistent with a septic arthritis. Multiple new cultures are pending. The patient has been started on Daptomycin which he is so far tolerating without difficulty. Baseline CK = 256. I will continue patient on treatment for now while we await update culture results.  -continue IV Daptomycin  -monitor CBCD, BMP, and CK  -follow up operative cultures  -monitor vitals  -serial RLE exams  -surgical site care per orthopedics  -continue follow up with orthopedics    2. Poor dentition. Patient with new hardware in the R knee.  -recommend OMFS evaluation for possible extractions    3. Type 2 diabetes mellitus with neuropathy and long term insulin use. Patient's last HbA1c was 6.2% on 7/1/2024. Elevated blood glucose is risk factor for wounds and infection. SLIM is on board for medical management.  -recommend tight glycemic control  -blood glucose management per primary service    4. CKD stage 3. This can impact antibiotic dosing. Upon review of patient's available past medical records it appears his baseline creatinine is approximately 0.6-0.9 Creatinine has remained within baseline throughout this hospitalization.  -monitor creatinine  -dose adjust antibiotic for renal function as needed  -avoid nephrotoxins     5. Antibiotic allergy. Patient reports kidney issue with use of vancomycin. We will avoid this antibiotic for now.  -monitor  patient for adverse medication reactions    Above plan was discussed in detail with patient at the bedside.  Above plan was discussed in detail with orthopedic surgery team.    Antibiotics:  Daptomycin 2  Antibiotics 2  Post op #1    Subjective:  Patient reports he's had a rough morning. Became very dizzy when he started moving around/standing. This was first time patient stood in 18 months. Now feeling nausea post-dizziness. RN's and PT at bedside helping patient. He has no fever, chills, sweats, shakes; no vomiting, abdominal pain,or diarrhea; no cough, shortness of breath, or chest pain. He reports no other symptoms.    Objective:  Vitals:  Temp:  [96.5 °F (35.8 °C)-97.8 °F (36.6 °C)] 96.7 °F (35.9 °C)  HR:  [64-75] 75  Resp:  [12-22] 18  BP: ()/(51-93) 121/87  SpO2:  [96 %-99 %] 98 %  Temp (24hrs), Av °F (36.1 °C), Min:96.5 °F (35.8 °C), Max:97.8 °F (36.6 °C)  Current: Temperature: (!) 96.7 °F (35.9 °C)    Physical Exam:   General Appearance:  Alert, interactive, nontoxic, no acute distress. He appears comfortable sitting back in bed.   Throat: Oropharynx moist without lesions.    Lungs:   Respirations unlabored.   Heart:  RRR; no murmur, rub or gallop.   Extremities: R knee surgical site with mild serosanguinous drainage, no purulence, no dense erythema - fresh gauze/tegaderm dressings applied by orthopedics during my visit.   Skin: No new rashes noted on exposed skin.     Labs, Imaging, & Other studies:   All pertinent labs and imaging studies were personally reviewed  Results from last 7 days   Lab Units 24  0426   WBC Thousand/uL 11.21*   HEMOGLOBIN g/dL 11.1*   PLATELETS Thousands/uL 185     Results from last 7 days   Lab Units 07/15/24  1152 07/15/24  1150 07/15/24  1149   GRAM STAIN RESULT  Rare Polys  No bacteria seen No Polys or Bacteria seen Rare Polys  No bacteria seen

## 2024-07-16 NOTE — TELEPHONE ENCOUNTER
As a follow-up, a second attempt has been made for outreach via fax to facility. Please see Contacts section for details.    Thank you  Charles Goodwin MA

## 2024-07-16 NOTE — PROGRESS NOTES
Patient:  MARK HAYWOOD    MRN:  [id number]    Aidin Request ID:  [id number]    Level of care reserved:  Inpatient Rehab Facility    Partner Reserved:  [facility] - ([place]), [place] [id number] [phone]    Clinical needs requested:    Geography searched:  10 miles around [id number]    Start of Service:    Request sent:  9:01am EDT on 7/16/2024 by Darlene Herrera    Partner reserved:  11:04am EDT on 7/16/2024 by Darlene Herrera    Choice list shared:  10:51am EDT on 7/16/2024 by Darlene Herrera

## 2024-07-16 NOTE — [facility] ADMISSION
Patients case reviewed with [facility] physician, patient is pre-approved for [facility] pending medical stability, LOF at discharge, bed availability.  [facility] admissions will continue to follow patient for progression of care and discharge readiness.

## 2024-07-17 LAB
ANION GAP SERPL CALCULATED.3IONS-SCNC: 5 MMOL/L (ref 4–13)
BUN SERPL-MCNC: 22 MG/DL (ref 5–25)
CALCIUM SERPL-MCNC: 8.2 MG/DL (ref 8.4–10.2)
CHLORIDE SERPL-SCNC: 107 MMOL/L (ref 96–108)
CO2 SERPL-SCNC: 24 MMOL/L (ref 21–32)
CREAT SERPL-MCNC: 0.71 MG/DL (ref 0.6–1.3)
ERYTHROCYTE [DISTWIDTH] IN BLOOD BY AUTOMATED COUNT: 13.8 % (ref 11.6–15.1)
GFR SERPL CREATININE-BSD FRML MDRD: 101 ML/MIN/1.73SQ M
GLUCOSE SERPL-MCNC: 145 MG/DL (ref 65–140)
GLUCOSE SERPL-MCNC: 157 MG/DL (ref 65–140)
GLUCOSE SERPL-MCNC: 190 MG/DL (ref 65–140)
GLUCOSE SERPL-MCNC: 85 MG/DL (ref 65–140)
GLUCOSE SERPL-MCNC: 87 MG/DL (ref 65–140)
HCT VFR BLD AUTO: 28.9 % (ref 36.5–49.3)
HGB BLD-MCNC: 9.7 G/DL (ref 12–17)
MCH RBC QN AUTO: 30.8 PG (ref 26.8–34.3)
MCHC RBC AUTO-ENTMCNC: 33.6 G/DL (ref 31.4–37.4)
MCV RBC AUTO: 92 FL (ref 82–98)
PLATELET # BLD AUTO: 156 THOUSANDS/UL (ref 149–390)
PMV BLD AUTO: 11.8 FL (ref 8.9–12.7)
POTASSIUM SERPL-SCNC: 3.6 MMOL/L (ref 3.5–5.3)
RBC # BLD AUTO: 3.15 MILLION/UL (ref 3.88–5.62)
SODIUM SERPL-SCNC: 136 MMOL/L (ref 135–147)
WBC # BLD AUTO: 11.62 THOUSAND/UL (ref 4.31–10.16)

## 2024-07-17 PROCEDURE — 97530 THERAPEUTIC ACTIVITIES: CPT

## 2024-07-17 PROCEDURE — 99232 SBSQ HOSP IP/OBS MODERATE 35: CPT | Performed by: INTERNAL MEDICINE

## 2024-07-17 PROCEDURE — 97110 THERAPEUTIC EXERCISES: CPT

## 2024-07-17 PROCEDURE — 97535 SELF CARE MNGMENT TRAINING: CPT

## 2024-07-17 PROCEDURE — 97116 GAIT TRAINING THERAPY: CPT

## 2024-07-17 PROCEDURE — 82948 REAGENT STRIP/BLOOD GLUCOSE: CPT

## 2024-07-17 PROCEDURE — 99024 POSTOP FOLLOW-UP VISIT: CPT | Performed by: STUDENT IN AN ORGANIZED HEALTH CARE EDUCATION/TRAINING PROGRAM

## 2024-07-17 PROCEDURE — 85027 COMPLETE CBC AUTOMATED: CPT | Performed by: PHYSICIAN ASSISTANT

## 2024-07-17 PROCEDURE — 80048 BASIC METABOLIC PNL TOTAL CA: CPT | Performed by: PHYSICIAN ASSISTANT

## 2024-07-17 RX ORDER — HYDROMORPHONE HYDROCHLORIDE 2 MG/1
2 TABLET ORAL EVERY 4 HOURS PRN
Status: DISCONTINUED | OUTPATIENT
Start: 2024-07-17 | End: 2024-07-21 | Stop reason: HOSPADM

## 2024-07-17 RX ORDER — HYDROMORPHONE HYDROCHLORIDE 2 MG/1
4 TABLET ORAL EVERY 4 HOURS PRN
Status: DISCONTINUED | OUTPATIENT
Start: 2024-07-17 | End: 2024-07-21 | Stop reason: HOSPADM

## 2024-07-17 RX ORDER — HYDROMORPHONE HCL/PF 1 MG/ML
0.5 SYRINGE (ML) INJECTION EVERY 2 HOUR PRN
Status: DISCONTINUED | OUTPATIENT
Start: 2024-07-17 | End: 2024-07-21 | Stop reason: HOSPADM

## 2024-07-17 RX ADMIN — INSULIN GLARGINE 25 UNITS: 100 INJECTION, SOLUTION SUBCUTANEOUS at 21:37

## 2024-07-17 RX ADMIN — ACETAMINOPHEN 975 MG: 325 TABLET, FILM COATED ORAL at 16:22

## 2024-07-17 RX ADMIN — ATORVASTATIN CALCIUM 40 MG: 40 TABLET, FILM COATED ORAL at 16:19

## 2024-07-17 RX ADMIN — INSULIN LISPRO 1 UNITS: 100 INJECTION, SOLUTION INTRAVENOUS; SUBCUTANEOUS at 16:19

## 2024-07-17 RX ADMIN — HYDROMORPHONE HYDROCHLORIDE 0.5 MG: 1 INJECTION, SOLUTION INTRAMUSCULAR; INTRAVENOUS; SUBCUTANEOUS at 04:20

## 2024-07-17 RX ADMIN — ONDANSETRON 4 MG: 2 INJECTION INTRAMUSCULAR; INTRAVENOUS at 04:20

## 2024-07-17 RX ADMIN — VERAPAMIL HYDROCHLORIDE 120 MG: 120 TABLET, FILM COATED, EXTENDED RELEASE ORAL at 21:37

## 2024-07-17 RX ADMIN — FOLIC ACID 1 MG: 1 TABLET ORAL at 08:23

## 2024-07-17 RX ADMIN — HYDROMORPHONE HYDROCHLORIDE 2 MG: 2 TABLET ORAL at 21:43

## 2024-07-17 RX ADMIN — ASPIRIN 81 MG: 81 TABLET, COATED ORAL at 17:17

## 2024-07-17 RX ADMIN — LEVOTHYROXINE SODIUM 175 MCG: 125 TABLET ORAL at 06:19

## 2024-07-17 RX ADMIN — Medication 1 TABLET: at 08:23

## 2024-07-17 RX ADMIN — SENNOSIDES 8.6 MG: 8.6 TABLET, FILM COATED ORAL at 12:04

## 2024-07-17 RX ADMIN — DOCUSATE SODIUM 100 MG: 100 CAPSULE, LIQUID FILLED ORAL at 12:04

## 2024-07-17 RX ADMIN — ASPIRIN 81 MG: 81 TABLET, COATED ORAL at 08:23

## 2024-07-17 RX ADMIN — DAPTOMYCIN 575 MG: 500 INJECTION, POWDER, LYOPHILIZED, FOR SOLUTION INTRAVENOUS at 17:17

## 2024-07-17 RX ADMIN — OXYCODONE HYDROCHLORIDE AND ACETAMINOPHEN 500 MG: 500 TABLET ORAL at 08:23

## 2024-07-17 RX ADMIN — INSULIN GLARGINE 25 UNITS: 100 INJECTION, SOLUTION SUBCUTANEOUS at 08:24

## 2024-07-17 RX ADMIN — PANTOPRAZOLE SODIUM 40 MG: 40 TABLET, DELAYED RELEASE ORAL at 08:23

## 2024-07-17 RX ADMIN — OXYCODONE HYDROCHLORIDE AND ACETAMINOPHEN 500 MG: 500 TABLET ORAL at 17:17

## 2024-07-17 RX ADMIN — HYDROMORPHONE HYDROCHLORIDE 2 MG: 2 TABLET ORAL at 17:20

## 2024-07-17 RX ADMIN — ESCITALOPRAM 5 MG: 5 TABLET, FILM COATED ORAL at 08:25

## 2024-07-17 NOTE — PROGRESS NOTES
[facility]  Progress Note  Name: Aaron Richards I  MRN: [id number]  Unit/Bed#: E2 -01 I Date of Admission: 7/15/2024   Date of Service: 7/17/2024 I Hospital Day: 2    Assessment & Plan   * Post-traumatic osteoarthritis of right knee  Assessment & Plan  The patient underwent a rather complex total knee replacement.  He is on daptomycin because of the possibility of infection.  He is generally stable postop.  Inpatient rehab is likely necessary.    Diabetes mellitus type 2, insulin dependent (HCC)  Assessment & Plan  Lab Results   Component Value Date    HGBA1C 6.2 (H) 07/01/2024       Recent Labs     07/16/24  2113 07/17/24  0700 07/17/24  1049 07/17/24  1542   POCGLU 174* 87 145* 157*       Well-controlled.      Hypertension, essential  Assessment & Plan  The patient had been on verapamil 360 mg daily  He had orthostatic hypotension in physical therapy today.  His verapamil dose has been reduced.    Hyperlipidemia  Assessment & Plan  Continue statin therapy.    PAD (peripheral artery disease) ([facility])  Assessment & Plan  Continue appropriate risk factor modification and antiplatelet therapy.    Acquired absence of right foot (HCC)  Assessment & Plan  Noted    Thyroid cancer ([facility])  Assessment & Plan  Continue thyroid hormone replacement.  Recent blood work to monitor this situation was good.    Alcoholism ([facility])  Assessment & Plan  The patient is currently abstinent.             Subjective:  The patient is feeling better today.  He slept a little better.  He has had no nausea or vomiting.  He was able to eat.  He had some lightheadedness and physical therapy which was associated with hypotension.  He was able to urinate.  He had to be straight cathed yesterday.    Physical Exam:   Temp:  [97.1 °F (36.2 °C)-99.3 °F (37.4 °C)] 99.3 °F (37.4 °C)  HR:  [63-85] 83  Resp:  [17-18] 17  BP: (101-151)/(56-82) 151/79    Gen: Well-developed, well-nourished, in no distress.  Neck: Supple.  No  lymphadenopathy, goiter, or bruit.  Heart: Regular rhythm.  No murmur, gallop, or rub.  Lungs: Clear to auscultation and percussion.  No wheezing, rales, or rhonchi.  Abd: Soft with active bowel sounds.  No mass or tenderness.  Extremities: No clubbing, cyanosis, or edema.  No calf tenderness.  Neuro: Alert and oriented.  No focal sign.  Skin: Warm and dry.      LABS:   CBC:   Lab Results   Component Value Date    WBC 11.62 (H) 07/17/2024    HGB 9.7 (L) 07/17/2024    HCT 28.9 (L) 07/17/2024    MCV 92 07/17/2024     07/17/2024    RBC 3.15 (L) 07/17/2024    MCH 30.8 07/17/2024    MCHC 33.6 07/17/2024    RDW 13.8 07/17/2024    MPV 11.8 07/17/2024   , CMP:   Lab Results   Component Value Date    SODIUM 136 07/17/2024    K 3.6 07/17/2024     07/17/2024    CO2 24 07/17/2024    BUN 22 07/17/2024    CREATININE 0.71 07/17/2024    CALCIUM 8.2 (L) 07/17/2024    EGFR 101 07/17/2024           VTE Pharmacologic Prophylaxis: Aspirin  VTE Mechanical Prophylaxis: sequential compression device

## 2024-07-17 NOTE — PLAN OF CARE
Problem: PHYSICAL THERAPY ADULT  Goal: Performs mobility at highest level of function for planned discharge setting.  See evaluation for individualized goals.  Description: Treatment/Interventions: Functional transfer training, LE strengthening/ROM, Elevations, Therapeutic exercise, Endurance training, Patient/family training, Equipment eval/education, Gait training, Compensatory technique education, Continued evaluation, Spoke to nursing, OT  Equipment Recommended: Walker (RW owns)       See flowsheet documentation for full assessment, interventions and recommendations.  Outcome: Progressing  Note: Prognosis: Good  Problem List: Decreased strength, Decreased range of motion, Decreased endurance, Impaired balance, Decreased mobility, Impaired judgement, Decreased safety awareness, Decreased skin integrity, Pain  Assessment: Pt seen for PT treatment session this date with interventions consisting of bed mobility, transfer training, gait training, and HEP, and education provided as needed for safety and direction to improve functional mobility, safety awareness, and activity tolerance. Pt agreeable to PT treatment session upon arrival, pt found SUPINE IN BED . At end of session, pt left  SEATED OUT OF BED IN RECLINER with all needs in reach. In comparison to previous session, pt with improvement in activity tolerance, endurance, standing balance, ambulation distances, ambulatory balance, and functional mobility. PT  IS LIMITED BY ACTIVITY TOLERANCE AMD ENDURANCE DUE TO ORTHO STATIC HYPOTENSION AND (+) DIZZINESS. Please refer to endurance deficit section of flowsheet for vitals.  PT  DEMONSTRATES IMPAIRMENTS IN r LE STRENGTH, ROM, FUNCTIONAL MOBILITY, SAFETY AWARENESS, JUDGEMENT AN GAIT STABILITY.  PT REQUIRES FREQUENT VERBAL CUES FOR SAFE TRANSFER AND MOBILITY TECHNIQUES, HANDPLACEMENT AND SFE USE OF RW. PT  WITH (+) BUCKLING OF r LE DURING AMBULATION REQUIRING MIN ASSIST X1.  PT  WITH INCREASED r KNEE FLEXION,  AND  DECREASED R HEEL CONTACT/ HEEL STRIKE. ANTALGIC GAIT NOTED. PT REQUIRES FREQUENT VERBAL CUES FOR PROPER POSITIONING OF R LE AT REST AND ENCOURAGED R KNEE EXTENSION EXERCISES AND POSITIONING.  PT  PERFORMS SEATED AND LONG SITTING AAROM- AROM FOR TKR REPLACEMENT. Continue to recommend LEVEL I MAXIMAL REHAB RESOURCE INTENSITY  at time of d/c in order to maximize pt's functional independence and safety w/ mobility. Pt continues to be functioning below baseline level. PT will continue to see pt while here in order to address the deficits listed above and provide interventions consistent w/ POC in effort to achieve STGs.    The patient's AM-PAC Basic Mobility Inpatient Short Form Raw Score is 18. A raw score greater than 16 suggests the patient may benefit from discharge to home. Please also refer to the recommendation of the Physical Therapist for safe discharge planning.  Barriers to Discharge: None     Rehab Resource Intensity Level, PT: I (Maximum Resource Intensity)    See flowsheet documentation for full assessment.

## 2024-07-17 NOTE — OCCUPATIONAL THERAPY NOTE
Occupational Therapy Progress Note     Patient Name: Aaron Richards  Today's Date: 7/17/2024  Problem List  Principal Problem:    Post-traumatic osteoarthritis of right knee  Active Problems:    Hypertension, essential    Hyperlipidemia    PAD (peripheral artery disease) ([facility])    Diabetes mellitus type 2, insulin dependent ([facility])            07/17/24 1047   OT Last Visit   OT Visit Date 07/17/24   Note Type   Note Type Treatment   Pain Assessment   Pain Assessment Tool 0-10   Pain Score 9   Pain Location/Orientation Orientation: Right;Location: Knee   Restrictions/Precautions   Weight Bearing Precautions Per Order Yes   RLE Weight Bearing Per Order WBAT   Other Precautions WBS;Pain;Fall Risk  (monitor BP - ortho static)   ADL   LB Dressing Assistance 4  Minimal Assistance   LB Dressing Deficit Setup;Don/doff R shoe;Don/doff L shoe   Functional Standing Tolerance   Time 1-2min   Activity staic standing. transfers.   Comments RW for support.   Bed Mobility   Supine to Sit 5  Supervision   Additional items HOB elevated;Bedrails   Additional Comments left seated EOB following session   Transfers   Sit to Stand   (CGA)   Additional items Assist x 1;Increased time required;Verbal cues   Stand to Sit 5  Supervision   Additional items Assist x 1;Increased time required;Verbal cues   Stand pivot   (CGA)   Additional items Assist x 1;Increased time required;Verbal cues   Additional Comments RW for support. Vitals: supine 115/74> seated 88/62> sitting 69/59> standing 71/49> sitting 104/65> post transfer to chair 79/56. Pt symptomatic with c/o dizziness and nausea.   Functional Mobility   Functional Mobility   (CGA)   Additional Comments RW bed > chair.   Additional items Rolling walker   Cognition   Overall Cognitive Status WFL   Arousal/Participation Cooperative   Attention Within functional limits   Orientation Level Oriented X4   Memory Within functional limits   Following Commands Follows all commands and directions  without difficulty   Activity Tolerance   Activity Tolerance Patient tolerated treatment well   Medical Staff Made Aware 0   Assessment   Assessment Pt seen for skilled OT tx this date. Tx focused on improving strength, activity tolerance and balance, safety awareness to increase independence with self care tasks. Pt tolerated session fair. Pt was limited by orthostasis and pain. Pt performed LB dressing La, bed mobility supervision, transfers supervision, mobility with RW CGA. Pt demonstrated fair - standing balance during functional tasks.Pt demonstrated ability to safely and appropriately attend to all tasks during session.Pt required moderate verbal cuing during session to safely complete tasks. Vitals: supine 115/74> seated 88/62> sitting 69/59> standing 71/49> sitting 104/65> post transfer to chair 79/56. Pt symptomatic with c/o dizziness and nausea. Current OT DC recommendations for pt is level 1 resources (pending continued progress and improvement in ortho stasis.   Plan   Treatment Interventions ADL retraining;Functional transfer training;UE strengthening/ROM;Endurance training;Patient/family training;Equipment evaluation/education;Neuromuscular reeducation;Compensatory technique education;Energy conservation;Activityengagement   Goal Expiration Date 07/30/24   OT Treatment Day 1   OT Frequency 3-5x/wk   Discharge Recommendation   Rehab Resource Intensity Level, OT I (Maximum Resource Intensity)   Additional Comments  The patient's raw score on the -PAC Daily Activity Inpatient Short Form is 19. A raw score of greater than or equal to 19 suggests the patient may benefit from discharge to home. Please refer to the recommendation of the Occupational Therapist for safe discharge planning.   AM-PAC Daily Activity Inpatient   Lower Body Dressing 2   Bathing 3   Toileting 3   Upper Body Dressing 3   Grooming 4   Eating 4   Daily Activity Raw Score 19   Daily Activity Standardized Score (Calc for Raw Score  >=11) 40.22   AM-PAC Applied Cognition Inpatient   Following a Speech/Presentation 4   Understanding Ordinary Conversation 4   Taking Medications 4   Remembering Where Things Are Placed or Put Away 4   Remembering List of 4-5 Errands 4   Taking Care of Complicated Tasks 4   Applied Cognition Raw Score 24   Applied Cognition Standardized Score 62.21     Rosanne Rios, OT

## 2024-07-17 NOTE — PLAN OF CARE
Problem: Prexisting or High Potential for Compromised Skin Integrity  Goal: Skin integrity is maintained or improved  Description: INTERVENTIONS:  - Identify patients at risk for skin breakdown  - Assess and monitor skin integrity  - Assess and monitor nutrition and hydration status  - Monitor labs   - Assess for incontinence   - Turn and reposition patient  - Assist with mobility/ambulation  - Relieve pressure over bony prominences  - Avoid friction and shearing  - Provide appropriate hygiene as needed including keeping skin clean and dry  - Evaluate need for skin moisturizer/barrier cream  - Collaborate with interdisciplinary team   - Patient/family teaching  - Consider wound care consult   Outcome: Progressing     Problem: PAIN - ADULT  Goal: Verbalizes/displays adequate comfort level or baseline comfort level  Description: Interventions:  - Encourage patient to monitor pain and request assistance  - Assess pain using appropriate pain scale  - Administer analgesics based on type and severity of pain and evaluate response  - Implement non-pharmacological measures as appropriate and evaluate response  - Consider cultural and social influences on pain and pain management  - Notify physician/advanced practitioner if interventions unsuccessful or patient reports new pain  Outcome: Progressing     Problem: SAFETY ADULT  Goal: Patient will remain free of falls  Description: INTERVENTIONS:  - Educate patient/family on patient safety including physical limitations  - Instruct patient to call for assistance with activity   - Consult OT/PT to assist with strengthening/mobility   - Keep Call bell within reach  - Keep bed low and locked with side rails adjusted as appropriate  - Keep care items and personal belongings within reach  - Initiate and maintain comfort rounds  - Make Fall Risk Sign visible to staff  - Offer Toileting every 2 Hours, in advance of need  - Initiate/Maintain bed alarm  - Obtain necessary fall risk  management equipment: walker nearby, alarm on  - Apply yellow socks and bracelet for high fall risk patients  - Consider moving patient to room near nurses station  Outcome: Progressing     Problem: DISCHARGE PLANNING  Goal: Discharge to home or other facility with appropriate resources  Description: INTERVENTIONS:  - Identify barriers to discharge w/patient and caregiver  - Arrange for needed discharge resources and transportation as appropriate  - Identify discharge learning needs (meds, wound care, etc.)  - Arrange for interpretive services to assist at discharge as needed  - Refer to Case Management Department for coordinating discharge planning if the patient needs post-hospital services based on physician/advanced practitioner order or complex needs related to functional status, cognitive ability, or social support system  Outcome: Progressing     Problem: Knowledge Deficit  Goal: Patient/family/caregiver demonstrates understanding of disease process, treatment plan, medications, and discharge instructions  Description: Complete learning assessment and assess knowledge base.  Interventions:  - Provide teaching at level of understanding  - Provide teaching via preferred learning methods  Outcome: Progressing    Problem: GENITOURINARY - ADULT  Goal: Maintains or returns to baseline urinary function  Description: INTERVENTIONS:  - Assess urinary function  - Encourage oral fluids to ensure adequate hydration if ordered  - Administer IV fluids as ordered to ensure adequate hydration  - Administer ordered medications as needed  - Offer frequent toileting  - Follow urinary retention protocol if ordered  Outcome: Progressing     Problem: METABOLIC, FLUID AND ELECTROLYTES - ADULT  Goal: Electrolytes maintained within normal limits  Description: INTERVENTIONS:  - Monitor labs and assess patient for signs and symptoms of electrolyte imbalances  - Administer electrolyte replacement as ordered  - Monitor response to  electrolyte replacements, including repeat lab results as appropriate  - Instruct patient on fluid and nutrition as appropriate  Outcome: Progressing  Goal: Fluid balance maintained  Description: INTERVENTIONS:  - Monitor labs   - Monitor I/O and WT  - Instruct patient on fluid and nutrition as appropriate  - Assess for signs & symptoms of volume excess or deficit  Outcome: Progressing  Goal: Glucose maintained within target range  Description: INTERVENTIONS:  - Monitor Blood Glucose as ordered  - Assess for signs and symptoms of hyperglycemia and hypoglycemia  - Administer ordered medications to maintain glucose within target range  - Assess nutritional intake and initiate nutrition service referral as needed  Outcome: Progressing

## 2024-07-17 NOTE — ASSESSMENT & PLAN NOTE
Ongoing SW/CM Assessment/Plan of Care Note     See SW/CM flowsheets for goals and other objective data.    Patient/Family discharge goal (s):  Goal #1: Psychosocial needs assessed  Goal #2: Communication facilitated  Goal #3: Home discharge arranged    PT Recommendation:       OT Recommendation:  Recommendations for Discharge: OT: Home    SLP Recommendation:  Recommendations for Discharge: SLP: none    Disposition:  Planned Discharge Destination: Home/apartment with Spouse/SO    Progress note:   SW following for d/c planning.  No therapy needs identified at d/c.  Anticipate pt to d/c home today with no needs.  Please re-consult SW if needs arise.         Continue thyroid hormone replacement.  Recent blood work to monitor this situation was good.

## 2024-07-17 NOTE — PLAN OF CARE
Problem: OCCUPATIONAL THERAPY ADULT  Goal: Performs self-care activities at highest level of function for planned discharge setting.  See evaluation for individualized goals.  Description: Treatment Interventions: ADL retraining, Functional transfer training, UE strengthening/ROM, Endurance training, Patient/family training, Equipment evaluation/education, Neuromuscular reeducation, Compensatory technique education, Energy conservation, Activityengagement          See flowsheet documentation for full assessment, interventions and recommendations.   Outcome: Progressing  Note: Limitation: Decreased ADL status, Decreased UE strength, Decreased Safe judgement during ADL, Decreased endurance, Decreased self-care trans, Decreased high-level ADLs  Prognosis: Good  Assessment: Pt seen for skilled OT tx this date. Tx focused on improving strength, activity tolerance and balance, safety awareness to increase independence with self care tasks. Pt tolerated session fair. Pt was limited by orthostasis and pain. Pt performed LB dressing La, bed mobility supervision, transfers supervision, mobility with RW CGA. Pt demonstrated fair - standing balance during functional tasks.Pt demonstrated ability to safely and appropriately attend to all tasks during session.Pt required moderate verbal cuing during session to safely complete tasks. Vitals: supine 115/74> seated 88/62> sitting 69/59> standing 71/49> sitting 104/65> post transfer to chair 79/56. Pt symptomatic with c/o dizziness and nausea. Current OT DC recommendations for pt is level 1 resources (pending continued progress and improvement in ortho stasis.     Rehab Resource Intensity Level, OT: I (Maximum Resource Intensity)

## 2024-07-17 NOTE — PROGRESS NOTES
Progress Note - Infectious Disease   Aaron Richards 59 y.o. male MRN: [id number]  Unit/Bed#: E2 -01 Encounter: [id number]    Impression/Plan:  1. R knee septic arthritis.  In the setting of previous traumatic injury with hardware failure requiring hardware removal. Has has now developed a sinus tract extending into the joint space. Patient with many previous cultures although they were all negative as they were obtained while he was on antibiotics.  He has now been off antibiotics since 7/2/2024. He is  s/p I&D and placement of a spacer on 7/15/2024. Operative findings consistent with a septic arthritis. Multiple new cultures are pending. The patient has been started on Daptomycin which he is so far tolerating without difficulty. Baseline CK = 256. I will continue patient on treatment for now while we await update culture results. Patient is comfortable with PICC line placement and would prefer to complete his antibiotic treatment at rehab vs the infusion center after discharge.   -continue IV Daptomycin  -monitor CBCD, BMP, and CK  -follow up operative cultures  -monitor vitals  -serial RLE exams  -surgical site care per orthopedics  -continue follow up with orthopedics  -anticipate PICC placement and 6-weeks of post-op IV antibiotics  -anticipate follow up with the ID office after discharge     2. Poor dentition. Patient with new hardware in the R knee.  -recommend OMFS evaluation for possible extractions     3. Type 2 diabetes mellitus with neuropathy and long term insulin use. Patient's last HbA1c was 6.2% on 7/1/2024. Elevated blood glucose is risk factor for wounds and infection. SLIM is on board for medical management.  -recommend tight glycemic control  -blood glucose management per primary service     4. CKD stage 3. This can impact antibiotic dosing. Upon review of patient's available past medical records it appears his baseline creatinine is approximately 0.6-0.9 Creatinine has remained within  baseline throughout this hospitalization.  -monitor creatinine  -dose adjust antibiotic for renal function as needed  -avoid nephrotoxins      5. Antibiotic allergy. Patient reports kidney issue with use of vancomycin. We will avoid this antibiotic for now.  -monitor patient for adverse medication reactions    Above plan was discussed in detail with patient at the bedside.  Above plan was discussed in detail with orthopedic surgery who agrees with plan for ongoing antibiotic treatment.    Antibiotics:  Daptomycin 3  Antibiotics 3  Post op #2    Subjective:  Patient reports he's tired this morning. Was up a lot last night due to bleeding from his surgical site. Reports his overnight nurse had to change the dressing twice and he now has an ace wrap for compression of the leg. He is less dizzy today. He has no fever, chills, sweats, shakes; no nausea, vomiting, abdominal pain, diarrhea, or dysuria; no cough, shortness of breath, or chest pain. No new symptoms.    Objective:  Vitals:  Temp:  [96.7 °F (35.9 °C)-99.2 °F (37.3 °C)] 97.9 °F (36.6 °C)  HR:  [77-93] 77  Resp:  [16-18] 17  BP: (103-143)/(65-83) 128/77  SpO2:  [96 %-98 %] 96 %  Temp (24hrs), Av.8 °F (36.6 °C), Min:96.7 °F (35.9 °C), Max:99.2 °F (37.3 °C)  Current: Temperature: 97.9 °F (36.6 °C)    Physical Exam:   General Appearance:  Alert, interactive, nontoxic, in no acute distress. He appears comfortable sitting up in bed.   Throat: Oropharynx moist without lesions. Poor dentition.    Lungs:   Clear to auscultation bilaterally; no wheezes, rhonchi or rales; respirations unlabored on room air.   Heart:  RRR; no murmur, rub or gallop.   Extremities: RLE with bulky dressings over the distal thigh/knee with full leg ace wrap compression intact, no breakthrough drainage or bleeding at time of my exam.   Skin: No new rashes noted on exposed skin.     Labs, Imaging, & Other studies:   All pertinent labs and imaging studies were personally reviewed  Results  from last 7 days   Lab Units 07/17/24  0610 07/16/24  0426   WBC Thousand/uL 11.62* 11.21*   HEMOGLOBIN g/dL 9.7* 11.1*   PLATELETS Thousands/uL 156 185     Results from last 7 days   Lab Units 07/16/24  0426   POTASSIUM mmol/L 3.7   CHLORIDE mmol/L 105   CO2 mmol/L 24   BUN mg/dL 21   CREATININE mg/dL 0.78   EGFR ml/min/1.73sq m 97   CALCIUM mg/dL 8.2*

## 2024-07-17 NOTE — PHYSICAL THERAPY NOTE
PHYSICAL THERAPY NOTE          Patient Name: Aaron Richards  Today's Date: 7/17/2024 07/17/24 1048   Note Type   Note Type Treatment   Pain Assessment   Pain Assessment Tool 0-10   Pain Score 9   Pain Location/Orientation Orientation: Right;Location: Knee   Hospital Pain Intervention(s) Repositioned;Cold applied;Ambulation/increased activity;Emotional support   Restrictions/Precautions   RLE Weight Bearing Per Order WBAT   Other Precautions WBS;Fall Risk;Pain   General   Chart Reviewed Yes   Family/Caregiver Present No   Cognition   Overall Cognitive Status WFL   Arousal/Participation Cooperative   Attention Within functional limits   Orientation Level Oriented X4   Memory Within functional limits   Following Commands Follows all commands and directions without difficulty   Subjective   Subjective I am sore.   Bed Mobility   Supine to Sit 5  Supervision   Additional items Assist x 1;HOB elevated;Increased time required;Verbal cues;Bedrails   Transfers   Sit to Stand 4  Minimal assistance  (MIN- CG ASSIST X1 VERBAL CUES FOR SAFE TECHNIQUE)   Additional items Trapeze bar;Verbal cues;Assist x 1  (STANDBY OF ANOTHER FOR SAFETY DUE TO HYPOTENSION)   Stand to Sit 4  Minimal assistance   Additional items Assist x 1;Armrests;Increased time required;Verbal cues   Stand pivot 4  Minimal assistance   Additional items Assist x 1;Increased time required;Verbal cues   Additional Comments VERBAL CUES FOR SAFE TRANSFER TECHNIQUES.  SIT TO STAND TRANSFERS PERFORMED X3 WITH MIN- CG ASSIST X1, STANDING TOLERANCE 30 SECONDS, X1, 40 SECONDS X1 AND 1 MINUTE X1.   Ambulation/Elevation   Gait pattern Forward Flexion;Antalgic;Decreased R stance;Narrow YUMIKO;Excessively slow;Step to;Short stride;Decreased heel strike;Decreased toe off   Gait Assistance 4  Minimal assist   Additional items Assist x 1;Verbal cues   Assistive Device Rolling walker   Distance 2'  X1   Ambulation/Elevation Additional Comments (+) DIZZINESS REPORTED WITH STANDING ACTIVITY. bp REFER TO ENDURACNE SECTION OG CHART   Balance   Static Sitting Normal   Dynamic Sitting Good   Static Standing Fair   Dynamic Standing Poor +   Ambulatory Poor +   Endurance Deficit   Endurance Deficit Yes   Endurance Deficit Description (+) DIZZINESS  BP  EOB 88/62, 69/52, EOB, 71/49 STANDING, 104/65 EOB POST STANDING 1 MINUTE, 79/56 POST AMB/ OUT OF BED TO CHAIR.   Activity Tolerance   Activity Tolerance Treatment limited secondary to medical complications (Comment)  (HYPOTENSION)   Medical Staff Made Aware VALERIA THORPE   Nurse Made Aware YES   Exercises   Quad Sets Sitting;10 reps;Right;AROM  (LONGSITTING)   Hip Flexion Sitting;Right;AAROM;5 reps   Knee AROM Long Arc Quad Sitting;5 reps;AROM;Right   Ankle Pumps Sitting;10 reps;AROM;Bilateral   Balance training  EOB X 15 MINUTES WITH SUPERVISION   Equipment Use   Comments PT EDUCATED PROPER POSITIONING OF r LE WHEN AT REST AND WITH STANDING AND AMBULATION. PT TENDS TO er AND FLEX r KNEE AT REST, INCREASED r KNEE FLEXION WITH STANDING WITH DECREASED r HEEL STRIKE AND HEEL CONTACT.   Assessment   Prognosis Good   Problem List Decreased strength;Decreased range of motion;Decreased endurance;Impaired balance;Decreased mobility;Impaired judgement;Decreased safety awareness;Decreased skin integrity;Pain   Assessment Pt seen for PT treatment session this date with interventions consisting of bed mobility, transfer training, gait training, and HEP, and education provided as needed for safety and direction to improve functional mobility, safety awareness, and activity tolerance. Pt agreeable to PT treatment session upon arrival, pt found SUPINE IN BED . At end of session, pt left  SEATED OUT OF BED IN RECLINER with all needs in reach. In comparison to previous session, pt with improvement in activity tolerance, endurance, standing balance, ambulation distances, ambulatory balance,  and functional mobility. PT  IS LIMITED BY ACTIVITY TOLERANCE AMD ENDURANCE DUE TO ORTHO STATIC HYPOTENSION AND (+) DIZZINESS. Please refer to endurance deficit section of flowsheet for vitals.  PT  DEMONSTRATES IMPAIRMENTS IN r LE STRENGTH, ROM, FUNCTIONAL MOBILITY, SAFETY AWARENESS, JUDGEMENT AN GAIT STABILITY.  PT REQUIRES FREQUENT VERBAL CUES FOR SAFE TRANSFER AND MOBILITY TECHNIQUES, HANDPLACEMENT AND SFE USE OF RW. PT  WITH (+) BUCKLING OF r LE DURING AMBULATION REQUIRING MIN ASSIST X1.  PT  WITH INCREASED r KNEE FLEXION,  AND DECREASED R HEEL CONTACT/ HEEL STRIKE. ANTALGIC GAIT NOTED. PT REQUIRES FREQUENT VERBAL CUES FOR PROPER POSITIONING OF R LE AT REST AND ENCOURAGED R KNEE EXTENSION EXERCISES AND POSITIONING.  PT  PERFORMS SEATED AND LONG SITTING AAROM- AROM FOR TKR REPLACEMENT. Continue to recommend LEVEL I MAXIMAL REHAB RESOURCE INTENSITY  at time of d/c in order to maximize pt's functional independence and safety w/ mobility. Pt continues to be functioning below baseline level. PT will continue to see pt while here in order to address the deficits listed above and provide interventions consistent w/ POC in effort to achieve STGs.    The patient's [facility] Basic Mobility Inpatient Short Form Raw Score is 18. A raw score greater than 16 suggests the patient may benefit from discharge to home. Please also refer to the recommendation of the Physical Therapist for safe discharge planning.   Goals   Patient Goals Walking and getting my muscles stronger.   PT Treatment Day 3   Plan   Treatment/Interventions Functional transfer training;LE strengthening/ROM;Therapeutic exercise;Endurance training;Patient/family training;Equipment eval/education;Bed mobility;Gait training;Spoke to nursing;OT   Progress Slow progress, medical status limitations  (HYPOTENSION)   PT Frequency 5-7x/wk;Twice a day   Discharge Recommendation   Rehab Resource Intensity Level, PT I (Maximum Resource Intensity)   AM-PAC Basic Mobility  Inpatient   Turning in Flat Bed Without Bedrails 4   Lying on Back to Sitting on Edge of Flat Bed Without Bedrails 4   Moving Bed to Chair 3   Standing Up From Chair Using Arms 3   Walk in Room 3   Climb 3-5 Stairs With Railing 1   Basic Mobility Inpatient Raw Score 18   Basic Mobility Standardized Score 41.05   [facility] Highest Level Of Mobility   JH-HLM Goal 6: Walk 10 steps or more   -HLM Achieved 5: Stand (1 or more minutes)   Education   Education Provided Mobility training;Home exercise program;Assistive device   Patient Reinforcement needed   End of Consult   Patient Position at End of Consult Bedside chair;All needs within reach  (iCE TO r KNEE)      07/17/24 1048   Note Type   Note Type Treatment   Pain Assessment   Pain Assessment Tool 0-10   Pain Score 9   Pain Location/Orientation Orientation: Right;Location: Knee   Hospital Pain Intervention(s) Repositioned;Cold applied;Ambulation/increased activity;Emotional support   Restrictions/Precautions   RLE Weight Bearing Per Order WBAT   Other Precautions WBS;Fall Risk;Pain   General   Chart Reviewed Yes   Family/Caregiver Present No   Cognition   Overall Cognitive Status WFL   Arousal/Participation Cooperative   Attention Within functional limits   Orientation Level Oriented X4   Memory Within functional limits   Following Commands Follows all commands and directions without difficulty   Subjective   Subjective I am sore.   Bed Mobility   Supine to Sit 5  Supervision   Additional items Assist x 1;HOB elevated;Increased time required;Verbal cues;Bedrails   Transfers   Sit to Stand 4  Minimal assistance  (MIN- CG ASSIST X1 VERBAL CUES FOR SAFE TECHNIQUE)   Additional items Trapeze bar;Verbal cues;Assist x 1  (STANDBY OF ANOTHER FOR SAFETY DUE TO HYPOTENSION)   Stand to Sit 4  Minimal assistance   Additional items Assist x 1;Armrests;Increased time required;Verbal cues   Stand pivot 4  Minimal assistance   Additional items Assist x 1;Increased time  required;Verbal cues   Additional Comments VERBAL CUES FOR SAFE TRANSFER TECHNIQUES.  SIT TO STAND TRANSFERS PERFORMED X3 WITH MIN- CG ASSIST X1, STANDING TOLERANCE 30 SECONDS, X1, 40 SECONDS X1 AND 1 MINUTE X1.   Ambulation/Elevation   Gait pattern Forward Flexion;Antalgic;Decreased R stance;Narrow YUMIKO;Excessively slow;Step to;Short stride;Decreased heel strike;Decreased toe off   Gait Assistance 4  Minimal assist   Additional items Assist x 1;Verbal cues   Assistive Device Rolling walker   Distance 2' X1   Ambulation/Elevation Additional Comments (+) DIZZINESS REPORTED WITH STANDING ACTIVITY. bp REFER TO ENDURACNE SECTION OG CHART   Balance   Static Sitting Normal   Dynamic Sitting Good   Static Standing Fair   Dynamic Standing Poor +   Ambulatory Poor +   Endurance Deficit   Endurance Deficit Yes   Endurance Deficit Description (+) DIZZINESS  BP  EOB 88/62, 69/52, EOB, 71/49 STANDING, 104/65 EOB POST STANDING 1 MINUTE, 79/56 POST AMB/ OUT OF BED TO CHAIR.   Activity Tolerance   Activity Tolerance Treatment limited secondary to medical complications (Comment)  (HYPOTENSION)   Medical Staff Made Aware VALERIA THORPE   Nurse Made Aware YES   Exercises   Quad Sets Sitting;10 reps;Right;AROM  (LONGSITTING)   Hip Flexion Sitting;Right;AAROM;5 reps   Knee AROM Long Arc Quad Sitting;5 reps;AROM;Right   Ankle Pumps Sitting;10 reps;AROM;Bilateral   Balance training  EOB X 15 MINUTES WITH SUPERVISION   Equipment Use   Comments PT EDUCATED PROPER POSITIONING OF r LE WHEN AT REST AND WITH STANDING AND AMBULATION. PT TENDS TO er AND FLEX r KNEE AT REST, INCREASED r KNEE FLEXION WITH STANDING WITH DECREASED r HEEL STRIKE AND HEEL CONTACT.   Assessment   Prognosis Good   Problem List Decreased strength;Decreased range of motion;Decreased endurance;Impaired balance;Decreased mobility;Impaired judgement;Decreased safety awareness;Decreased skin integrity;Pain   Assessment Pt seen for PT treatment session this date with interventions  consisting of bed mobility, transfer training, gait training, and HEP, and education provided as needed for safety and direction to improve functional mobility, safety awareness, and activity tolerance. Pt agreeable to PT treatment session upon arrival, pt found SUPINE IN BED . At end of session, pt left  SEATED OUT OF BED IN RECLINER with all needs in reach. In comparison to previous session, pt with improvement in activity tolerance, endurance, standing balance, ambulation distances, ambulatory balance, and functional mobility. PT  IS LIMITED BY ACTIVITY TOLERANCE AMD ENDURANCE DUE TO ORTHO STATIC HYPOTENSION AND (+) DIZZINESS. Please refer to endurance deficit section of flowsheet for vitals.  PT  DEMONSTRATES IMPAIRMENTS IN r LE STRENGTH, ROM, FUNCTIONAL MOBILITY, SAFETY AWARENESS, JUDGEMENT AN GAIT STABILITY.  PT REQUIRES FREQUENT VERBAL CUES FOR SAFE TRANSFER AND MOBILITY TECHNIQUES, HANDPLACEMENT AND SFE USE OF RW. PT  WITH (+) BUCKLING OF r LE DURING AMBULATION REQUIRING MIN ASSIST X1.  PT  WITH INCREASED r KNEE FLEXION,  AND DECREASED R HEEL CONTACT/ HEEL STRIKE. ANTALGIC GAIT NOTED. PT REQUIRES FREQUENT VERBAL CUES FOR PROPER POSITIONING OF R LE AT REST AND ENCOURAGED R KNEE EXTENSION EXERCISES AND POSITIONING.  PT  PERFORMS SEATED AND LONG SITTING AAROM- AROM FOR TKR REPLACEMENT. Continue to recommend LEVEL I MAXIMAL REHAB RESOURCE INTENSITY  at time of d/c in order to maximize pt's functional independence and safety w/ mobility. Pt continues to be functioning below baseline level. PT will continue to see pt while here in order to address the deficits listed above and provide interventions consistent w/ POC in effort to achieve STGs.    The patient's AM-PAC Basic Mobility Inpatient Short Form Raw Score is 18. A raw score greater than 16 suggests the patient may benefit from discharge to home. Please also refer to the recommendation of the Physical Therapist for safe discharge planning.   Goals   Patient  Goals Walking and getting my muscles stronger.   PT Treatment Day 3   Plan   Treatment/Interventions Functional transfer training;LE strengthening/ROM;Therapeutic exercise;Endurance training;Patient/family training;Equipment eval/education;Bed mobility;Gait training;Spoke to nursing;OT   Progress Slow progress, medical status limitations  (HYPOTENSION)   PT Frequency 5-7x/wk;Twice a day   Discharge Recommendation   Rehab Resource Intensity Level, PT I (Maximum Resource Intensity)   AM-PAC Basic Mobility Inpatient   Turning in Flat Bed Without Bedrails 4   Lying on Back to Sitting on Edge of Flat Bed Without Bedrails 4   Moving Bed to Chair 3   Standing Up From Chair Using Arms 3   Walk in Room 3   Climb 3-5 Stairs With Railing 1   Basic Mobility Inpatient Raw Score 18   Basic Mobility Standardized Score 41.05   [facility] Highest Level Of Mobility   -HLM Goal 6: Walk 10 steps or more   -HLM Achieved 5: Stand (1 or more minutes)   Education   Education Provided Mobility training;Home exercise program;Assistive device   Patient Reinforcement needed   End of Consult   Patient Position at End of Consult Bedside chair;All needs within reach  (iCE TO r KNEE)   Savita Prajapati, PTA

## 2024-07-17 NOTE — ASSESSMENT & PLAN NOTE
The patient underwent a rather complex total knee replacement.  He is on daptomycin because of the possibility of infection.  He is generally stable postop.  Inpatient rehab is likely necessary.

## 2024-07-17 NOTE — PROGRESS NOTES
Progress Note - Orthopedics   Aaron Richards 59 y.o. male MRN: [id number]  Unit/Bed#: E2 -01      Assessment:  59 y.o.male POD 2 s/p 59M s/p R antibiotic cement TKA with Dr. 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      Plan:  WBAT RLE   Pain control  DVT ppx: aspirin 81mg BID x 4 weeks  PT/OT - ROM as tolerated, pillow/blankets under achilles not behind knee while in bed  f/u I/O cultures   Abx per ID - Dapto  Dispo: Ortho will follow      Subjective:    59 y.o.male seen and examined at bedside. Patient having pain in the knee and some bleeding at the operative site. Patient states that he has had bleeding issues post operatively for all his recent surgeries. He denies any other acute complaints.     Labs:  0   Lab Value Date/Time    HCT 28.9 (L) 07/17/2024 0610    HCT 33.1 (L) 07/16/2024 0426    HCT 42.4 07/01/2024 1151    HGB 9.7 (L) 07/17/2024 0610    HGB 11.1 (L) 07/16/2024 0426    HGB 13.9 07/01/2024 1151    INR 1.08 07/01/2024 1151    WBC 11.62 (H) 07/17/2024 0610    WBC 11.21 (H) 07/16/2024 0426    WBC 9.78 07/01/2024 1151    ESR 24 (H) 02/21/2020 1101       Meds:    Current Facility-Administered Medications:     acetaminophen (TYLENOL) tablet 650 mg, 650 mg, Oral, Q4H PRN, Dionne Kuhn PA-C    acetaminophen (TYLENOL) tablet 975 mg, 975 mg, Oral, Q8H, Dionne Kuhn PA-C, 975 mg at 07/17/24 1622    ascorbic acid (VITAMIN C) tablet 500 mg, 500 mg, Oral, BID, Dionne Kuhn PA-C, 500 mg at 07/17/24 0823    aspirin (ECOTRIN LOW STRENGTH) EC tablet 81 mg, 81 mg, Oral, BID, Dionne Kuhn PA-C, 81 mg at 07/17/24 0823    atorvastatin (LIPITOR) tablet 40 mg, 40 mg, Oral, Daily With Dinner, Dionne Kuhn PA-C, 40 mg at 07/17/24 1619    calcium carbonate (TUMS) chewable tablet 1,000 mg, 1,000 mg, Oral, Daily PRN, Dionne Kuhn PA-C    DAPTOmycin (CUBICIN) 575 mg in sodium chloride 0.9 % 50 mL IVPB, 6 mg/kg (Adjusted), Intravenous, Q24H, Av Juarez,  MD, Last Rate: 100 mL/hr at 07/16/24 1706, 575 mg at 07/16/24 1706    docusate sodium (COLACE) capsule 100 mg, 100 mg, Oral, BID, Dionne Kuhn PA-C, 100 mg at 07/17/24 1204    escitalopram (LEXAPRO) tablet 5 mg, 5 mg, Oral, Daily, Dionne Kuhn PA-C, 5 mg at 07/17/24 0825    folic acid (FOLVITE) tablet 1 mg, 1 mg, Oral, Daily, Dionne Kuhn PA-C, 1 mg at 07/17/24 0823    gabapentin (NEURONTIN) capsule 300 mg, 300 mg, Oral, HS, Dionne Kuhn PA-C    HYDROmorphone (DILAUDID) injection 0.5 mg, 0.5 mg, Intravenous, Q2H PRN, Jorge Hendrix DO    HYDROmorphone (DILAUDID) tablet 2 mg, 2 mg, Oral, Q4H PRN, Clay Zavala MD    HYDROmorphone (DILAUDID) tablet 4 mg, 4 mg, Oral, Q4H PRN, Clay Zavala MD    insulin glargine (LANTUS) subcutaneous injection 25 Units 0.25 mL, 25 Units, Subcutaneous, Q12H TESS, Clay Zavala MD, 25 Units at 07/17/24 0824    insulin lispro (HumALOG/ADMELOG) 100 units/mL subcutaneous injection 1-6 Units, 1-6 Units, Subcutaneous, TID AC, 1 Units at 07/17/24 1619 **AND** Fingerstick Glucose (POCT), , , TID AC, Dionne Kuhn PA-C    levothyroxine tablet 175 mcg, 175 mcg, Oral, Early Morning, Dionne Kuhn PA-C, 175 mcg at 07/17/24 0619    multivitamin-minerals (CENTRUM) tablet 1 tablet, 1 tablet, Oral, Daily, Dionne Kuhn PA-C, 1 tablet at 07/17/24 0823    ondansetron (ZOFRAN) injection 4 mg, 4 mg, Intravenous, Q6H PRN, Dionne Kuhn PA-C, 4 mg at 07/17/24 0420    pantoprazole (PROTONIX) EC tablet 40 mg, 40 mg, Oral, Every Other Day, Dionne Kuhn PA-C, 40 mg at 07/17/24 0823    senna (SENOKOT) tablet 8.6 mg, 1 tablet, Oral, Daily, Dionne Kuhn PA-C, 8.6 mg at 07/17/24 1204    simethicone (MYLICON) chewable tablet 80 mg, 80 mg, Oral, 4x Daily PRN, Dionne Kuhn PA-C    verapamil (CALAN-SR) CR tablet 120 mg, 120 mg, Oral, HS, Clay Zavala MD    zolpidem (AMBIEN) tablet 10 mg, 10 mg, Oral, HS PRN, Dionne Kuhn PA-C    Blood Culture:   Lab Results    Component Value Date    BLOODCX No Growth After 5 Days. 10/10/2020    BLOODCX No Growth After 5 Days. 10/10/2020       Wound Culture:   Lab Results   Component Value Date    WOUNDCULT 2+ Growth of Morganella morganii (A) 08/30/2020    WOUNDCULT (A) 08/30/2020     2+ Growth of Methicillin Resistant Staphylococcus aureus    WOUNDCULT 1+ Growth of Enterobacter cloacae complex (A) 08/30/2020    WOUNDCULT 1+ Growth of Enterococcus faecalis (A) 08/30/2020       Ins and Outs:  I/O last 24 hours:  In: -   Out: 2200 [Urine:2200]        Physical:  Vitals:    07/17/24 1538   BP: 151/79   Pulse: 83   Resp: 17   Temp: 99.3 °F (37.4 °C)   SpO2: 95%         Musculoskeletal: right Lower Extremity  Skin -  No erythema or ecchymosis. Slow drainage from the distal portion of incision   Dressing changed - pressure dressing placed on top  Sensation intact   Motor intact to, +ankle dorsi/plantar flexion  Foot warm and well perfused - absence of all toes.         Charles Kelly PA-C

## 2024-07-17 NOTE — TELEPHONE ENCOUNTER
Upon review of the In Basket request we were able to locate, review, and update the patient chart as requested for Diabetic Eye Exam.    Any additional questions or concerns should be emailed to the Practice Liaisons via the appropriate education email address, please do not reply via In Basket.    Thank you  Charles Goodwin MA   [facility]

## 2024-07-17 NOTE — ASSESSMENT & PLAN NOTE
Lab Results   Component Value Date    HGBA1C 6.2 (H) 07/01/2024       Recent Labs     07/16/24  2113 07/17/24  0700 07/17/24  1049 07/17/24  1542   POCGLU 174* 87 145* 157*       Well-controlled.

## 2024-07-17 NOTE — PLAN OF CARE
Problem: PAIN - ADULT  Goal: Verbalizes/displays adequate comfort level or baseline comfort level  Description: Interventions:  - Encourage patient to monitor pain and request assistance  - Assess pain using appropriate pain scale  - Administer analgesics based on type and severity of pain and evaluate response  - Implement non-pharmacological measures as appropriate and evaluate response  - Consider cultural and social influences on pain and pain management  - Notify physician/advanced practitioner if interventions unsuccessful or patient reports new pain  Outcome: Progressing     Problem: SAFETY ADULT  Goal: Patient will remain free of falls  Description: INTERVENTIONS:  - Educate patient/family on patient safety including physical limitations  - Instruct patient to call for assistance with activity   - Consult OT/PT to assist with strengthening/mobility   - Keep Call bell within reach  - Keep bed low and locked with side rails adjusted as appropriate  - Keep care items and personal belongings within reach  - Initiate and maintain comfort rounds  - Make Fall Risk Sign visible to staff  - Offer Toileting every 2 Hours, in advance of need  - Initiate/Maintain bed and chair alarm  - Obtain necessary fall risk management equipment: bed and chair alarm, walker  - Apply yellow socks and bracelet for high fall risk patients  - Consider moving patient to room near nurses station  Outcome: Progressing

## 2024-07-17 NOTE — PLAN OF CARE
Problem: PHYSICAL THERAPY ADULT  Goal: Performs mobility at highest level of function for planned discharge setting.  See evaluation for individualized goals.  Description: Treatment/Interventions: Functional transfer training, LE strengthening/ROM, Elevations, Therapeutic exercise, Endurance training, Patient/family training, Equipment eval/education, Gait training, Compensatory technique education, Continued evaluation, Spoke to nursing, OT  Equipment Recommended: Walker (RW owns)       See flowsheet documentation for full assessment, interventions and recommendations.  7/17/2024 1800 by Savita Prajapati PTA  Outcome: Progressing  Note: Prognosis: Good  Problem List: Decreased strength, Decreased range of motion, Decreased endurance, Impaired balance, Decreased mobility, Impaired judgement, Decreased safety awareness, Pain, Decreased skin integrity  Assessment: Pt seen for PT treatment session this date with interventions consisting of bed mobility, transfer training, and HEP, and education provided as needed for safety and direction to improve functional mobility, safety awareness, and activity tolerance. Pt agreeable to PT treatment session upon arrival, pt found SEATED OUT OF BED IN RECLINER . At end of session, pt left SUPINE IN BED with all needs in reach. In comparison to previous session, pt with improvement in activity tolerance. PT  DEMONSTRATES IMPAIRMENTS IN r LE STRENGTH, ROM, FUNCTIONAL MOBILITY, SAFETY AWARENESS, JUDGEMENT AND GAIT STABILITY.  PT REQUIRES FREQUENT VERBAL CUES FOR SAFE TRANSFER AND MOBILITY TECHNIQUES, HANDPLACEMENT.  ENCOURAGED UE OF rW FOR TRANSFERS SIT TO STAND AND STAND PIVOT HOWEVER PT DECLINED.  INCREASED RISK FOR FALLS DUE TO POOR SAFETY AWARENESS AND JUDGEMENT.  DECREASED STABILITY/ BALANCE WITH WEIGHTBEARING ACTIVITY WITHOUT USE OF AN AD.   PT  WITH INCREASED r KNEE FLEXION,  AND DECREASED R HEEL CONTACT/ HEEL STRIKE. ANTALGIC GAIT NOTED. PT REQUIRES FREQUENT VERBAL CUES  FOR PROPER POSITIONING OF R LE AT REST AND ENCOURAGED R KNEE EXTENSION EXERCISES AND POSITIONING OF r LE IN AN EXTENDED NEUTRAL ROTATED POSITION. PT  PERFORMS SUPINE R LE AAROM FOR HS, SLR, HIP ABD./ADD., SAQ, ACTIVE QS AND  AP X 10 REPS.  PASSIVE HC STRETCHING TO B/L HEEL CORDS X 3 REPS WITH 15 SECOND HOLD. MODERATE BLOODY DRAINAGE NOTED ON ACE WARP AND PT'S SOCK. WHEN KIM PLATA REMOVED ACE WRAP, BANADAGES UNDERNEATH ACE WRAP SATURATED WITH BLOODY DRAINAGE. BONI ALVAREZ REPLACED AND REINFORCED DRESSING AND ACE WRAP.   Please refer to endurance deficit section of flowsheet for vitals. Continue to recommend  LEVEL I MAXIMAL REHAB RESOURCE INTENSITY  at time of d/c in order to maximize pt's functional independence and safety w/ mobility. Pt continues to be functioning below baseline level. PT will continue to see pt while here in order to address the deficits listed above and provide interventions consistent w/ POC in effort to achieve STGs.  Barriers to Discharge: None     Rehab Resource Intensity Level, PT: I (Maximum Resource Intensity)    See flowsheet documentation for full assessment.

## 2024-07-17 NOTE — PHYSICAL THERAPY NOTE
PHYSICAL THERAPY NOTE          Patient Name: Aaron Richards  Today's Date: 7/17/2024 07/17/24 4161   Note Type   Note Type BID visit/treatment   Pain Assessment   Pain Assessment Tool 0-10   Pain Score 7   Pain Location/Orientation Orientation: Right;Location: Knee   Hospital Pain Intervention(s) Repositioned;Ambulation/increased activity;Emotional support   Restrictions/Precautions   RLE Weight Bearing Per Order WBAT   Other Precautions WBS;Pain;Fall Risk   General   Chart Reviewed Yes   Family/Caregiver Present No   Subjective   Subjective I am cold all of a sudden.  pt reports wanting to get back to bed.   Bed Mobility   Sit to Supine 4  Minimal assistance   Additional items Assist x 1;Increased time required;Verbal cues;LE management   Additional Comments pt out of bed in recliner upon arrival.   Transfers   Sit to Stand 4  Minimal assistance   Additional items Assist x 1;Armrests;Increased time required;Verbal cues   Stand to Sit 4  Minimal assistance   Additional items Assist x 1;Bedrails;Increased time required;Verbal cues   Stand pivot 4  Minimal assistance   Additional items Assist x 1;Bedrails;Armrests;Increased time required;Verbal cues   Additional Comments VERBAL CUES FOR SAFE TECHNIQUE. ENCOURAGED USE OF rW FOR SAFE MOBILITY/ STABILITY.  PT DECLINED AND PERFORMED WITHOUT   Balance   Static Standing Fair -   Dynamic Standing Poor +   Exercises   Heel Cord Stretch Supine;PROM;Left;Right  (X 3 REPS.)   TKR Supine;10 reps;AROM;Right;AAROM  (AP, QS, AA HS,. A/A SLR, A/A SAQ, A/A HIP ABD./ADD. PASSIVE HC STRETVHING TO B/L LE'S.)   Equipment Use   Comments REINFORCED POSITIONING OF r LE ROLLED TOWEL PLACED LATERAL ASPECT OF r LE TO MAINTAIN NEUTRAL ROTATION. NOTED  MODERATE AMOUNT OF RED BLOOD ON PT'S SOCKS AND ACE WRAP.  BONI ALVAREZ MADE AWARE AND REDRESSED/ REINFORCED DRESSING AND ACE WRAP.   Assessment   Prognosis Good    Problem List Decreased strength;Decreased range of motion;Decreased endurance;Impaired balance;Decreased mobility;Impaired judgement;Decreased safety awareness;Pain;Decreased skin integrity   Assessment Pt seen for PT treatment session this date with interventions consisting of bed mobility, transfer training, and HEP, and education provided as needed for safety and direction to improve functional mobility, safety awareness, and activity tolerance. Pt agreeable to PT treatment session upon arrival, pt found SEATED OUT OF BED IN RECLINER . At end of session, pt left SUPINE IN BED with all needs in reach. In comparison to previous session, pt with improvement in activity tolerance. PT  DEMONSTRATES IMPAIRMENTS IN r LE STRENGTH, ROM, FUNCTIONAL MOBILITY, SAFETY AWARENESS, JUDGEMENT AND GAIT STABILITY.  PT REQUIRES FREQUENT VERBAL CUES FOR SAFE TRANSFER AND MOBILITY TECHNIQUES, HANDPLACEMENT.  ENCOURAGED UE OF rW FOR TRANSFERS SIT TO STAND AND STAND PIVOT HOWEVER PT DECLINED.  INCREASED RISK FOR FALLS DUE TO POOR SAFETY AWARENESS AND JUDGEMENT.  DECREASED STABILITY/ BALANCE WITH WEIGHTBEARING ACTIVITY WITHOUT USE OF AN AD.   PT  WITH INCREASED r KNEE FLEXION,  AND DECREASED R HEEL CONTACT/ HEEL STRIKE. ANTALGIC GAIT NOTED. PT REQUIRES FREQUENT VERBAL CUES FOR PROPER POSITIONING OF R LE AT REST AND ENCOURAGED R KNEE EXTENSION EXERCISES AND POSITIONING OF r LE IN AN EXTENDED NEUTRAL ROTATED POSITION. PT  PERFORMS SUPINE R LE AAROM FOR HS, SLR, HIP ABD./ADD., SAQ, ACTIVE QS AND  AP X 10 REPS.  PASSIVE HC STRETCHING TO B/L HEEL CORDS X 3 REPS WITH 15 SECOND HOLD. MODERATE BLOODY DRAINAGE NOTED ON ACE WARP AND PT'S SOCK. WHEN KIM PLATA REMOVED ACE WRAP, BANADAGES UNDERNEATH ACE WRAP SATURATED WITH BLOODY DRAINAGE. BONI ALVAREZ REPLACED AND REINFORCED DRESSING AND ACE WRAP.   Please refer to endurance deficit section of flowsheet for vitals. Continue to recommend  LEVEL I MAXIMAL REHAB RESOURCE INTENSITY  at time of d/c in  order to maximize pt's functional independence and safety w/ mobility. Pt continues to be functioning below baseline level. PT will continue to see pt while here in order to address the deficits listed above and provide interventions consistent w/ POC in effort to achieve STGs.   Goals   Patient Goals Walking and getting my muscles stronger.   STG Expiration Date 07/29/24   PT Treatment Day 4   Plan   Treatment/Interventions Functional transfer training;LE strengthening/ROM;Therapeutic exercise;Endurance training;Patient/family training;Equipment eval/education;Bed mobility;Spoke to nursing   Progress Slow progress, decreased activity tolerance   PT Frequency 5-7x/wk;Twice a day   Discharge Recommendation   Rehab Resource Intensity Level, PT I (Maximum Resource Intensity)   AM-PAC Basic Mobility Inpatient   Turning in Flat Bed Without Bedrails 4   Lying on Back to Sitting on Edge of Flat Bed Without Bedrails 4   Moving Bed to Chair 3   Standing Up From Chair Using Arms 3   Walk in Room 3   Climb 3-5 Stairs With Railing 1   Basic Mobility Inpatient Raw Score 18   Basic Mobility Standardized Score 41.05   [facility] Highest Level Of Mobility   -HLM Goal 6: Walk 10 steps or more   -HLM Achieved 4: Move to chair/commode   Education   Education Provided Mobility training;Home exercise program;Assistive device   Patient Reinforcement needed   End of Consult   Patient Position at End of Consult Supine;Bed/Chair alarm activated;All needs within reach   End of Consult Comments PT DECLINED ICE PACK TO r KNEE.     Savita Prajapati, PTA

## 2024-07-17 NOTE — ASSESSMENT & PLAN NOTE
The patient had been on verapamil 360 mg daily  He had orthostatic hypotension in physical therapy today.  His verapamil dose has been reduced.

## 2024-07-18 LAB
ERYTHROCYTE [DISTWIDTH] IN BLOOD BY AUTOMATED COUNT: 13.8 % (ref 11.6–15.1)
GLUCOSE SERPL-MCNC: 104 MG/DL (ref 65–140)
GLUCOSE SERPL-MCNC: 175 MG/DL (ref 65–140)
GLUCOSE SERPL-MCNC: 189 MG/DL (ref 65–140)
GLUCOSE SERPL-MCNC: 191 MG/DL (ref 65–140)
HCT VFR BLD AUTO: 28.7 % (ref 36.5–49.3)
HGB BLD-MCNC: 9.4 G/DL (ref 12–17)
MCH RBC QN AUTO: 30.1 PG (ref 26.8–34.3)
MCHC RBC AUTO-ENTMCNC: 32.8 G/DL (ref 31.4–37.4)
MCV RBC AUTO: 92 FL (ref 82–98)
PLATELET # BLD AUTO: 172 THOUSANDS/UL (ref 149–390)
PMV BLD AUTO: 12.1 FL (ref 8.9–12.7)
RBC # BLD AUTO: 3.12 MILLION/UL (ref 3.88–5.62)
WBC # BLD AUTO: 12.4 THOUSAND/UL (ref 4.31–10.16)

## 2024-07-18 PROCEDURE — 99232 SBSQ HOSP IP/OBS MODERATE 35: CPT | Performed by: INTERNAL MEDICINE

## 2024-07-18 PROCEDURE — 82948 REAGENT STRIP/BLOOD GLUCOSE: CPT

## 2024-07-18 PROCEDURE — 85027 COMPLETE CBC AUTOMATED: CPT | Performed by: PHYSICIAN ASSISTANT

## 2024-07-18 PROCEDURE — 97530 THERAPEUTIC ACTIVITIES: CPT

## 2024-07-18 PROCEDURE — 99024 POSTOP FOLLOW-UP VISIT: CPT | Performed by: STUDENT IN AN ORGANIZED HEALTH CARE EDUCATION/TRAINING PROGRAM

## 2024-07-18 PROCEDURE — 97116 GAIT TRAINING THERAPY: CPT

## 2024-07-18 PROCEDURE — 97110 THERAPEUTIC EXERCISES: CPT

## 2024-07-18 RX ORDER — INSULIN GLARGINE 100 [IU]/ML
25 INJECTION, SOLUTION SUBCUTANEOUS EVERY 12 HOURS SCHEDULED
Qty: 10 ML | Refills: 0 | OUTPATIENT
Start: 2024-07-18

## 2024-07-18 RX ADMIN — ASPIRIN 81 MG: 81 TABLET, COATED ORAL at 17:27

## 2024-07-18 RX ADMIN — OXYCODONE HYDROCHLORIDE AND ACETAMINOPHEN 500 MG: 500 TABLET ORAL at 17:27

## 2024-07-18 RX ADMIN — Medication 1 TABLET: at 08:30

## 2024-07-18 RX ADMIN — ESCITALOPRAM 5 MG: 5 TABLET, FILM COATED ORAL at 08:32

## 2024-07-18 RX ADMIN — ACETAMINOPHEN 975 MG: 325 TABLET, FILM COATED ORAL at 22:00

## 2024-07-18 RX ADMIN — FOLIC ACID 1 MG: 1 TABLET ORAL at 08:31

## 2024-07-18 RX ADMIN — INSULIN LISPRO 1 UNITS: 100 INJECTION, SOLUTION INTRAVENOUS; SUBCUTANEOUS at 11:39

## 2024-07-18 RX ADMIN — ACETAMINOPHEN 975 MG: 325 TABLET, FILM COATED ORAL at 06:15

## 2024-07-18 RX ADMIN — INSULIN GLARGINE 25 UNITS: 100 INJECTION, SOLUTION SUBCUTANEOUS at 08:30

## 2024-07-18 RX ADMIN — INSULIN LISPRO 2 UNITS: 100 INJECTION, SOLUTION INTRAVENOUS; SUBCUTANEOUS at 16:05

## 2024-07-18 RX ADMIN — ASPIRIN 81 MG: 81 TABLET, COATED ORAL at 08:31

## 2024-07-18 RX ADMIN — OXYCODONE HYDROCHLORIDE AND ACETAMINOPHEN 500 MG: 500 TABLET ORAL at 08:31

## 2024-07-18 RX ADMIN — SENNOSIDES 8.6 MG: 8.6 TABLET, FILM COATED ORAL at 11:45

## 2024-07-18 RX ADMIN — HYDROMORPHONE HYDROCHLORIDE 4 MG: 2 TABLET ORAL at 20:20

## 2024-07-18 RX ADMIN — HYDROMORPHONE HYDROCHLORIDE 2 MG: 2 TABLET ORAL at 15:24

## 2024-07-18 RX ADMIN — DAPTOMYCIN 575 MG: 500 INJECTION, POWDER, LYOPHILIZED, FOR SOLUTION INTRAVENOUS at 17:48

## 2024-07-18 RX ADMIN — ATORVASTATIN CALCIUM 40 MG: 40 TABLET, FILM COATED ORAL at 15:30

## 2024-07-18 RX ADMIN — LEVOTHYROXINE SODIUM 175 MCG: 125 TABLET ORAL at 06:15

## 2024-07-18 RX ADMIN — INSULIN GLARGINE 25 UNITS: 100 INJECTION, SOLUTION SUBCUTANEOUS at 21:46

## 2024-07-18 NOTE — PLAN OF CARE
Problem: PHYSICAL THERAPY ADULT  Goal: Performs mobility at highest level of function for planned discharge setting.  See evaluation for individualized goals.  Description: Treatment/Interventions: Functional transfer training, LE strengthening/ROM, Elevations, Therapeutic exercise, Endurance training, Patient/family training, Equipment eval/education, Gait training, Compensatory technique education, Continued evaluation, Spoke to nursing, OT  Equipment Recommended: Walker (RW owns)       See flowsheet documentation for full assessment, interventions and recommendations.  Outcome: Progressing  Note: Prognosis: Good  Problem List: Decreased strength, Decreased range of motion, Decreased endurance, Impaired balance, Decreased mobility, Decreased skin integrity, Orthopedic restrictions, Pain  Assessment: Pt seen for PT treatment session this date with interventions consisting of bed mobility, transfer training, gait training, and HEP, and education provided as needed for safety and direction to improve functional mobility, safety awareness, and activity tolerance. Pt agreeable to PT treatment session upon arrival, pt found supine in bed . At end of session, pt left  seated out of bed with all needs in reach. In comparison to previous session, pt with improvement in activity tolerance, R le strength, and functional mobility.PT  DEMONSTRATES IMPAIRMENTS IN R LE STRENGTH, ROM, FUNCTIONAL MOBILITY, SAFETY AWARENESS, JUDGEMENT AND GAIT STABILITY.  PT REQUIRES FREQUENT VERBAL CUES FOR HANDPLACEMENT and R LE POSITIONING WITH STAND TO SIT TRANSFERS. PT  MORE RECEPTIVE TO USE OF RW THIS SESSION.    PT  WITH INCREASED r KNEE FLEXION,  AND DECREASED R HEEL CONTACT/ HEEL STRIKE. ANTALGIC GAIT NOTED. PT REQUIRES FREQUENT VERBAL CUES FOR PROPER POSITIONING OF R LE AT REST AND ENCOURAGED R KNEE EXTENSION EXERCISES AND POSITIONING OF r LE IN AN EXTENDED NEUTRAL ROTATED POSITION. PT  PERFORMS SUPINE R LE AAROM-AROM FOR TKR. PT  TOLERATED INCREASED RESPS TO 12.  PT  REMAINS UNABLE TO I'LY SLR OR PERFORM SAQ.MODERATE BLOODY DRAINAGE NOTED ON ACE WRAP AND PT'S SOCK.  PT'S RN, GERALDO NOTIFIED.  LIMITED AMBULATION DISTANCES DUE TO DROPS IN BP AND (+) DIZZINESS.  Please refer to endurance deficit section of flowsheet for vitals. PT'S RN AWARE OF DROPS IN BP AND (+) DIZZINESS REPORTED WITH STANDING AND AMBULATION OF SHORT DISTANCES.  Continue to recommend  level I maximal rehab resource intensity  at time of d/c in order to maximize pt's functional independence and safety w/ mobility. Pt continues to be functioning below baseline level. PT will continue to see pt while here in order to address the deficits listed above and provide interventions consistent w/ POC in effort to achieve STGs.  Barriers to Discharge: None     Rehab Resource Intensity Level, PT: I (Maximum Resource Intensity)    See flowsheet documentation for full assessment.

## 2024-07-18 NOTE — ASSESSMENT & PLAN NOTE
Lab Results   Component Value Date    HGBA1C 6.2 (H) 07/01/2024       Recent Labs     07/17/24  1542 07/17/24  2129 07/18/24  0634 07/18/24  1112   POCGLU 157* 190* 104 189*         Well-controlled.

## 2024-07-18 NOTE — PHYSICAL THERAPY NOTE
PHYSICAL THERAPY NOTE          Patient Name: Aaron Richards  Today's Date: 7/18/2024 07/18/24 1016   Note Type   Note Type Treatment   Pain Assessment   Pain Assessment Tool 0-10   Pain Score 8   Pain Location/Orientation Orientation: Right;Location: Knee   Hospital Pain Intervention(s) Repositioned;Ambulation/increased activity;Emotional support;Rest   Restrictions/Precautions   RLE Weight Bearing Per Order WBAT   Other Precautions WBS;Pain;Fall Risk   General   Chart Reviewed Yes   Family/Caregiver Present No   Cognition   Overall Cognitive Status WFL   Arousal/Participation Cooperative;Responsive;Alert   Attention Within functional limits   Orientation Level Oriented X4   Memory Within functional limits   Following Commands Follows all commands and directions without difficulty   Subjective   Subjective I was awake at 5 am this AM, started my day early.   Bed Mobility   Supine to Sit 5  Supervision   Additional items Assist x 1;HOB elevated;Bedrails;Increased time required   Additional Comments pt  oiut of bed in chair post PT session.   Transfers   Sit to Stand 4  Minimal assistance   Additional items Assist x 1;Increased time required;Verbal cues  (RW)   Stand to Sit 4  Minimal assistance   Additional items Assist x 1;Armrests;Increased time required;Verbal cues   Stand pivot 4  Minimal assistance   Additional items Assist x 1;Increased time required;Verbal cues   Additional Comments verbal cues for hand placement with all transitions. verbal cuesf or advancement of operative leg with stand to sit transfers.   Ambulation/Elevation   Gait pattern Forward Flexion;Short stride;Step to;Excessively slow;Antalgic;Decreased R stance;Decreased heel strike  (increased R knee flexion)   Gait Assistance 4  Minimal assist   Additional items Assist x 1;Verbal cues   Assistive Device Rolling walker   Distance 3' x1   Ambulation/Elevation  Additional Comments (+) dizzines with standing and ambulation.  pt with drop in bp  from 115/71 EOb to 68/51 standing.   Balance   Static Sitting Normal   Dynamic Sitting Good   Static Standing Fair -  (w rw)   Dynamic Standing Poor +   Ambulatory Poor +   Endurance Deficit   Endurance Deficit Description (+) dizziness with standing and ambulation  bp eob 115/71 stnading 68/51, sitting post amb out of bed 81/62 and sitting recliner 112/73   Activity Tolerance   Activity Tolerance Patient limited by pain;Treatment limited secondary to medical complications (Comment)  (hypotension (+) dizziness.)   Nurse Made Aware yes Rn, Temo   Exercises   TKR Supine;AAROM;AROM;Right  (x 12 reps ap, qs, hs, a/a saq, a/a slr, hip abd./add.)   Assessment   Prognosis Good   Problem List Decreased strength;Decreased range of motion;Decreased endurance;Impaired balance;Decreased mobility;Decreased skin integrity;Orthopedic restrictions;Pain   Assessment Pt seen for PT treatment session this date with interventions consisting of bed mobility, transfer training, gait training, and HEP, and education provided as needed for safety and direction to improve functional mobility, safety awareness, and activity tolerance. Pt agreeable to PT treatment session upon arrival, pt found supine in bed . At end of session, pt left  seated out of bed with all needs in reach. In comparison to previous session, pt with improvement in activity tolerance, R le strength, and functional mobility.PT  DEMONSTRATES IMPAIRMENTS IN R LE STRENGTH, ROM, FUNCTIONAL MOBILITY, SAFETY AWARENESS, JUDGEMENT AND GAIT STABILITY.  PT REQUIRES FREQUENT VERBAL CUES FOR HANDPLACEMENT and R LE POSITIONING WITH STAND TO SIT TRANSFERS. PT  MORE RECEPTIVE TO USE OF RW THIS SESSION.    PT  WITH INCREASED r KNEE FLEXION,  AND DECREASED R HEEL CONTACT/ HEEL STRIKE. ANTALGIC GAIT NOTED. PT REQUIRES FREQUENT VERBAL CUES FOR PROPER POSITIONING OF R LE AT REST AND ENCOURAGED R KNEE  EXTENSION EXERCISES AND POSITIONING OF r LE IN AN EXTENDED NEUTRAL ROTATED POSITION. PT  PERFORMS SUPINE R LE AAROM-AROM FOR TKR. PT TOLERATED INCREASED RESPS TO 12.  PT  REMAINS UNABLE TO I'LY SLR OR PERFORM SAQ.MODERATE BLOODY DRAINAGE NOTED ON ACE WRAP AND PT'S SOCK.  PT'S RNGERALDO NOTIFIED.  LIMITED AMBULATION DISTANCES DUE TO DROPS IN BP AND (+) DIZZINESS.  Please refer to endurance deficit section of flowsheet for vitals. PT'S RN AWARE OF DROPS IN BP AND (+) DIZZINESS REPORTED WITH STANDING AND AMBULATION OF SHORT DISTANCES.  Continue to recommend  level I maximal rehab resource intensity  at time of d/c in order to maximize pt's functional independence and safety w/ mobility. Pt continues to be functioning below baseline level. PT will continue to see pt while here in order to address the deficits listed above and provide interventions consistent w/ POC in effort to achieve STGs.   Goals   Patient Goals To be able to walk and get upa nd down my steps.   STG Expiration Date 07/29/24   PT Treatment Day 5   Plan   Treatment/Interventions Functional transfer training;LE strengthening/ROM;Therapeutic exercise;Endurance training;Patient/family training;Equipment eval/education;Bed mobility;Gait training;Spoke to nursing   Progress Slow progress, medical status limitations  (hypotension with (+) dizziness)   PT Frequency 5-7x/wk;Twice a day   Discharge Recommendation   Rehab Resource Intensity Level, PT I (Maximum Resource Intensity)   Additional Comments The patient's AM-PAC Basic Mobility Inpatient Short Form Raw Score is 18. A raw score greater than 16 suggests the patient may benefit from discharge to home. Please also refer to the recommendation of the Physical Therapist for safe discharge planning.   AM-PAC Basic Mobility Inpatient   Turning in Flat Bed Without Bedrails 4   Lying on Back to Sitting on Edge of Flat Bed Without Bedrails 4   Moving Bed to Chair 3   Standing Up From Chair Using Arms 3   Walk in  Room 3   Climb 3-5 Stairs With Railing 1   Basic Mobility Inpatient Raw Score 18   Basic Mobility Standardized Score 41.05   [facility] Highest Level Of Mobility   -HL Goal 6: Walk 10 steps or more   -HL Achieved 5: Stand (1 or more minutes)   Education   Education Provided Mobility training;Home exercise program;Assistive device   Patient Reinforcement needed;Demonstrates acceptance/verbal understanding   End of Consult   Patient Position at End of Consult Bedside chair;All needs within reach   End of Consult Comments ice to R knee and anterior thigh      07/18/24 1016   Note Type   Note Type Treatment   Pain Assessment   Pain Assessment Tool 0-10   Pain Score 8   Pain Location/Orientation Orientation: Right;Location: Knee   Hospital Pain Intervention(s) Repositioned;Ambulation/increased activity;Emotional support;Rest   Restrictions/Precautions   RLE Weight Bearing Per Order WBAT   Other Precautions WBS;Pain;Fall Risk   General   Chart Reviewed Yes   Family/Caregiver Present No   Cognition   Overall Cognitive Status WFL   Arousal/Participation Cooperative;Responsive;Alert   Attention Within functional limits   Orientation Level Oriented X4   Memory Within functional limits   Following Commands Follows all commands and directions without difficulty   Subjective   Subjective I was awake at 5 am this AM, started my day early.   Bed Mobility   Supine to Sit 5  Supervision   Additional items Assist x 1;HOB elevated;Bedrails;Increased time required   Additional Comments pt  oiut of bed in chair post PT session.   Transfers   Sit to Stand 4  Minimal assistance   Additional items Assist x 1;Increased time required;Verbal cues  (RW)   Stand to Sit 4  Minimal assistance   Additional items Assist x 1;Armrests;Increased time required;Verbal cues   Stand pivot 4  Minimal assistance   Additional items Assist x 1;Increased time required;Verbal cues   Additional Comments verbal cues for hand placement with all  transitions. verbal cuesf or advancement of operative leg with stand to sit transfers.   Ambulation/Elevation   Gait pattern Forward Flexion;Short stride;Step to;Excessively slow;Antalgic;Decreased R stance;Decreased heel strike  (increased R knee flexion)   Gait Assistance 4  Minimal assist   Additional items Assist x 1;Verbal cues   Assistive Device Rolling walker   Distance 3' x1   Ambulation/Elevation Additional Comments (+) dizzines with standing and ambulation.  pt with drop in bp  from 115/71 EOb to 68/51 standing.   Balance   Static Sitting Normal   Dynamic Sitting Good   Static Standing Fair -  (w rw)   Dynamic Standing Poor +   Ambulatory Poor +   Endurance Deficit   Endurance Deficit Description (+) dizziness with standing and ambulation  bp eob 115/71 stnading 68/51, sitting post amb out of bed 81/62 and sitting recliner 112/73   Activity Tolerance   Activity Tolerance Patient limited by pain;Treatment limited secondary to medical complications (Comment)  (hypotension (+) dizziness.)   Nurse Made Aware yes Rn, Temo   Exercises   TKR Supine;AAROM;AROM;Right  (x 12 reps ap, qs, hs, a/a saq, a/a slr, hip abd./add.)   Assessment   Prognosis Good   Problem List Decreased strength;Decreased range of motion;Decreased endurance;Impaired balance;Decreased mobility;Decreased skin integrity;Orthopedic restrictions;Pain   Assessment Pt seen for PT treatment session this date with interventions consisting of bed mobility, transfer training, gait training, and HEP, and education provided as needed for safety and direction to improve functional mobility, safety awareness, and activity tolerance. Pt agreeable to PT treatment session upon arrival, pt found supine in bed . At end of session, pt left  seated out of bed with all needs in reach. In comparison to previous session, pt with improvement in activity tolerance, R le strength, and functional mobility.PT  DEMONSTRATES IMPAIRMENTS IN R LE STRENGTH, ROM, FUNCTIONAL  MOBILITY, SAFETY AWARENESS, JUDGEMENT AND GAIT STABILITY.  PT REQUIRES FREQUENT VERBAL CUES FOR HANDPLACEMENT and R LE POSITIONING WITH STAND TO SIT TRANSFERS. PT  MORE RECEPTIVE TO USE OF RW THIS SESSION.    PT  WITH INCREASED r KNEE FLEXION,  AND DECREASED R HEEL CONTACT/ HEEL STRIKE. ANTALGIC GAIT NOTED. PT REQUIRES FREQUENT VERBAL CUES FOR PROPER POSITIONING OF R LE AT REST AND ENCOURAGED R KNEE EXTENSION EXERCISES AND POSITIONING OF r LE IN AN EXTENDED NEUTRAL ROTATED POSITION. PT  PERFORMS SUPINE R LE AAROM-AROM FOR TKR. PT TOLERATED INCREASED RESPS TO 12.  PT  REMAINS UNABLE TO I'LY SLR OR PERFORM SAQ.MODERATE BLOODY DRAINAGE NOTED ON ACE WRAP AND PT'S SOCK.  PT'S RN, GERALDO NOTIFIED.  LIMITED AMBULATION DISTANCES DUE TO DROPS IN BP AND (+) DIZZINESS.  Please refer to endurance deficit section of flowsheet for vitals. PT'S RN AWARE OF DROPS IN BP AND (+) DIZZINESS REPORTED WITH STANDING AND AMBULATION OF SHORT DISTANCES.  Continue to recommend  level I maximal rehab resource intensity  at time of d/c in order to maximize pt's functional independence and safety w/ mobility. Pt continues to be functioning below baseline level. PT will continue to see pt while here in order to address the deficits listed above and provide interventions consistent w/ POC in effort to achieve STGs.   Goals   Patient Goals To be able to walk and get upa nd down my steps.   STG Expiration Date 07/29/24   PT Treatment Day 5   Plan   Treatment/Interventions Functional transfer training;LE strengthening/ROM;Therapeutic exercise;Endurance training;Patient/family training;Equipment eval/education;Bed mobility;Gait training;Spoke to nursing   Progress Slow progress, medical status limitations  (hypotension with (+) dizziness)   PT Frequency 5-7x/wk;Twice a day   Discharge Recommendation   Rehab Resource Intensity Level, PT I (Maximum Resource Intensity)   Additional Comments The patient's AM-PAC Basic Mobility Inpatient Short Form Raw  Score is 18. A raw score greater than 16 suggests the patient may benefit from discharge to home. Please also refer to the recommendation of the Physical Therapist for safe discharge planning.   AM-PAC Basic Mobility Inpatient   Turning in Flat Bed Without Bedrails 4   Lying on Back to Sitting on Edge of Flat Bed Without Bedrails 4   Moving Bed to Chair 3   Standing Up From Chair Using Arms 3   Walk in Room 3   Climb 3-5 Stairs With Railing 1   Basic Mobility Inpatient Raw Score 18   Basic Mobility Standardized Score 41.05   [facility] Highest Level Of Mobility   -HLM Goal 6: Walk 10 steps or more   -HLM Achieved 5: Stand (1 or more minutes)   Education   Education Provided Mobility training;Home exercise program;Assistive device   Patient Reinforcement needed;Demonstrates acceptance/verbal understanding   End of Consult   Patient Position at End of Consult Bedside chair;All needs within reach   End of Consult Comments ice to R knee and anterior thigh   Savita Prajapati, PTA

## 2024-07-18 NOTE — PROGRESS NOTES
Progress Note - Infectious Disease   Aaron Richards 59 y.o. male MRN: [id number]  Unit/Bed#: E2 -01 Encounter: [id number]    Impression/Plan:  1. R knee septic arthritis.  In the setting of previous traumatic injury with hardware failure requiring hardware removal. Has has now developed a sinus tract extending into the joint space. Patient with many previous cultures although they were all negative as they were obtained while he was on antibiotics.  He has now been off antibiotics since 7/2/2024. He is s/p I&D and placement of a spacer on 7/15/2024. Operative findings consistent with a septic arthritis. Multiple new cultures are pending but no growth preliminarily. Will request lab hold tissue cultures for 14 days. The patient has been started on Daptomycin which he is so far tolerating without difficulty. Baseline CK = 256. I will continue patient on treatment for now while we await update culture results. Patient is comfortable with PICC line placement and would prefer to complete his antibiotic treatment at rehab vs the infusion center after discharge.   -continue IV Daptomycin  -anticipate 6 weeks of post-op IV antibiotic treatment through 8/26/2024  -monitor CBCD, BMP, and CK  -follow up operative cultures  -monitor vitals  -serial RLE exams  -surgical site care per orthopedics  -continue follow up with orthopedics  -patient is cleared for PICC placement  -PICC to be removed by RN after final dose of IV antibiotic on 8/26/2024  -anticipate follow up with the ID office after discharge     2. Poor dentition. Patient with new hardware in the R knee.  -recommend OMFS evaluation for possible extractions     3. Type 2 diabetes mellitus with neuropathy and long term insulin use. Patient's last HbA1c was 6.2% on 7/1/2024. Elevated blood glucose is risk factor for wounds and infection. SLIM is on board for medical management.  -recommend tight glycemic control  -blood glucose management per primary service      4. CKD stage 3. This can impact antibiotic dosing. Upon review of patient's available past medical records it appears his baseline creatinine is approximately 0.6-0.9 Creatinine has remained within baseline throughout this hospitalization.  -monitor creatinine  -dose adjust antibiotic for renal function as needed  -avoid nephrotoxins      5. Antibiotic allergy. Patient reports kidney issue with use of vancomycin. We will avoid this antibiotic for now.  -monitor patient for adverse medication reactions    Above plan was discussed in detail with patient at the bedside.  Above plan was discussed in detail with orthopedic surgery who agrees with plan for long term antibiotic treatment.    Antibiotics:  Daptomycin 4  Antibiotics 4  Post op #3    Subjective:  Patient reports he's feeling well this morning. Has less bleeding/drainage from his RLE surgical sites. Was happy to work with PT/OT and wants to focus on rehab now. He has no fever, chills, sweats, shakes; no nausea, vomiting, abdominal pain, diarrhea, or dysuria; no cough, shortness of breath, or chest pain. No new symptoms.    Objective:  Vitals:  Temp:  [97.1 °F (36.2 °C)-99.3 °F (37.4 °C)] 98.7 °F (37.1 °C)  HR:  [63-83] 81  Resp:  [17-18] 18  BP: (101-151)/(56-79) 136/77  SpO2:  [95 %-98 %] 95 %  Temp (24hrs), Av.3 °F (36.8 °C), Min:97.1 °F (36.2 °C), Max:99.3 °F (37.4 °C)  Current: Temperature: 98.7 °F (37.1 °C)    Physical Exam:   General Appearance:  Alert, interactive, nontoxic, no acute distress. He appears comfortable sitting up in bed.   Throat: Oropharynx moist without lesions. Missing teeth, poor dentition.    Lungs:   Clear to auscultation bilaterally; no wheezes, rhonchi or rales; respirations unlabored on room air.   Heart:  RRR; no murmur, rub or gallop.   Abdomen:   Soft, non-tender, non-distended, positive bowel sounds.     Extremities: RLE with intact dressings and overlying ace wrap from the mid-thigh to the mid-shin/calf.   Skin: No new  rashes noted on exposed skin.     Labs, Imaging, & Other studies:   All pertinent labs and imaging studies were personally reviewed  Results from last 7 days   Lab Units 07/18/24  0520 07/17/24  0610 07/16/24  0426   WBC Thousand/uL 12.40* 11.62* 11.21*   HEMOGLOBIN g/dL 9.4* 9.7* 11.1*   PLATELETS Thousands/uL 172 156 185     Results from last 7 days   Lab Units 07/17/24  0610   POTASSIUM mmol/L 3.6   CHLORIDE mmol/L 107   CO2 mmol/L 24   BUN mg/dL 22   CREATININE mg/dL 0.71   EGFR ml/min/1.73sq m 101   CALCIUM mg/dL 8.2*     Results from last 7 days   Lab Units 07/15/24  1152 07/15/24  1150 07/15/24  1149 07/15/24  1146 07/15/24  1141   GRAM STAIN RESULT  Rare Polys  No bacteria seen No Polys or Bacteria seen Rare Polys  No bacteria seen No Polys or Bacteria seen No Polys or Bacteria seen

## 2024-07-18 NOTE — ASSESSMENT & PLAN NOTE
The patient had been on verapamil 360 mg daily  He has orthostatic hypotension   His verapamil dose has been stopped.

## 2024-07-18 NOTE — RESTORATIVE TECHNICIAN NOTE
Restorative Technician Note      Patient Name: Aaron Richards     Restorative Tech Visit Date: 07/18/24  Note Type: Mobility  Patient Position Upon Consult: Bedside chair  Mobility / Activity Provided: Assisted PTA with chair follow  Activity Performed: Ambulated; Range of motion  Assistive Device: Roller walker  Patient Position at End of Consult: All needs within reach; Bedside chair; Bed/Chair alarm activated; Other (comment) (Left in care of PTA)

## 2024-07-18 NOTE — PROGRESS NOTES
Progress Note - Orthopedics   Aaron Richards 59 y.o. male MRN: [id number]  Unit/Bed#: E2 -01      Assessment:  59 y.o.male POD 3 s/p R antibiotic cement TKA with Dr. 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      Plan:  WBAT RLE   Pain control  DVT ppx: aspirin 81mg BID x 4 weeks  PT/OT - ROM as tolerated, pillow/blankets under achilles not behind knee while in bed  Interoperative cultures continue to be negative, ID has requested to hold the cultures for 14 days total.  PICC consent was obtained and placed on the patient discharged today the patient will most likely need PICC line placement prior to discharge as IDs plan is for 6 weeks antibiotic treatment.  Abx per ID -currently Dapto  Dispo: Ortho will follow      Subjective:    59 y.o.male seen and examined at bedside.  Overall patient states he has been slowly improving but he continues to have pain in the operative knee as expected and into the lower leg/ankle area with some associated swelling.  He has been having some issues with bleeding when he is up and doing physical therapy this seems to resolve with rest and compression.  He denies any other acute complaints.  His plan is to go to rehab in [place] once cleared from a medical standpoint.    Labs:  0   Lab Value Date/Time    HCT 28.7 (L) 07/18/2024 0520    HCT 28.9 (L) 07/17/2024 0610    HCT 33.1 (L) 07/16/2024 0426    HGB 9.4 (L) 07/18/2024 0520    HGB 9.7 (L) 07/17/2024 0610    HGB 11.1 (L) 07/16/2024 0426    INR 1.08 07/01/2024 1151    WBC 12.40 (H) 07/18/2024 0520    WBC 11.62 (H) 07/17/2024 0610    WBC 11.21 (H) 07/16/2024 0426    ESR 24 (H) 02/21/2020 1101       Meds:    Current Facility-Administered Medications:     acetaminophen (TYLENOL) tablet 650 mg, 650 mg, Oral, Q4H PRN, Dionne Kuhn PA-C    acetaminophen (TYLENOL) tablet 975 mg, 975 mg, Oral, Q8H, Dionne Kuhn PA-C, 975 mg at 07/18/24 0615    ascorbic acid (VITAMIN C) tablet 500 mg,  500 mg, Oral, BID, Dionne Kuhn PA-C, 500 mg at 07/18/24 0831    aspirin (ECOTRIN LOW STRENGTH) EC tablet 81 mg, 81 mg, Oral, BID, Dionne Kuhn PA-C, 81 mg at 07/18/24 0831    atorvastatin (LIPITOR) tablet 40 mg, 40 mg, Oral, Daily With Dinner, Dionne Kuhn PA-C, 40 mg at 07/18/24 1530    DAPTOmycin (CUBICIN) 575 mg in sodium chloride 0.9 % 50 mL IVPB, 6 mg/kg (Adjusted), Intravenous, Q24H, Av Juarez MD, Last Rate: 100 mL/hr at 07/17/24 1717, 575 mg at 07/17/24 1717    docusate sodium (COLACE) capsule 100 mg, 100 mg, Oral, BID, Dionne Kuhn PA-C, 100 mg at 07/17/24 1204    escitalopram (LEXAPRO) tablet 5 mg, 5 mg, Oral, Daily, Dionne Kuhn PA-C, 5 mg at 07/18/24 0832    folic acid (FOLVITE) tablet 1 mg, 1 mg, Oral, Daily, Dionne Kuhn PA-C, 1 mg at 07/18/24 0831    gabapentin (NEURONTIN) capsule 300 mg, 300 mg, Oral, HS, Dionne Kuhn PA-C    HYDROmorphone (DILAUDID) injection 0.5 mg, 0.5 mg, Intravenous, Q2H PRN, Jorge Hendrix DO    HYDROmorphone (DILAUDID) tablet 2 mg, 2 mg, Oral, Q4H PRN, Clay Zavala MD, 2 mg at 07/18/24 1524    HYDROmorphone (DILAUDID) tablet 4 mg, 4 mg, Oral, Q4H PRN, Clay Zavala MD    insulin glargine (LANTUS) subcutaneous injection 25 Units 0.25 mL, 25 Units, Subcutaneous, Q12H TESS, Clay Zavala MD, 25 Units at 07/18/24 0830    insulin lispro (HumALOG/ADMELOG) 100 units/mL subcutaneous injection 1-6 Units, 1-6 Units, Subcutaneous, TID AC, 1 Units at 07/18/24 1139 **AND** Fingerstick Glucose (POCT), , , TID AC, Dionne Kuhn PA-C    levothyroxine tablet 175 mcg, 175 mcg, Oral, Early Morning, Dionne Kuhn PA-C, 175 mcg at 07/18/24 0615    multivitamin-minerals (CENTRUM) tablet 1 tablet, 1 tablet, Oral, Daily, Dionne Kuhn PA-C, 1 tablet at 07/18/24 0830    ondansetron (ZOFRAN) injection 4 mg, 4 mg, Intravenous, Q6H PRN, Dionne Kuhn PA-C, 4 mg at 07/17/24 0420    pantoprazole (PROTONIX) EC tablet 40 mg, 40 mg, Oral, Every Other Day,  Dionne Kuhn PA-C, 40 mg at 07/17/24 0823    senna (SENOKOT) tablet 8.6 mg, 1 tablet, Oral, Daily, Dionne Kuhn PA-C, 8.6 mg at 07/18/24 1145    zolpidem (AMBIEN) tablet 10 mg, 10 mg, Oral, HS PRN, Dionne Kuhn PA-C    Blood Culture:   Lab Results   Component Value Date    BLOODCX No Growth After 5 Days. 10/10/2020    BLOODCX No Growth After 5 Days. 10/10/2020       Wound Culture:   Lab Results   Component Value Date    WOUNDCULT 2+ Growth of Morganella morganii (A) 08/30/2020    WOUNDCULT (A) 08/30/2020     2+ Growth of Methicillin Resistant Staphylococcus aureus    WOUNDCULT 1+ Growth of Enterobacter cloacae complex (A) 08/30/2020    WOUNDCULT 1+ Growth of Enterococcus faecalis (A) 08/30/2020       Ins and Outs:  I/O last 24 hours:  In: -   Out: 900 [Urine:900]        Physical:  Vitals:    07/18/24 0746   BP: 109/69   Pulse: 79   Resp: 16   Temp: 98 °F (36.7 °C)   SpO2: 95%         Musculoskeletal: right Lower Extremity  Skin -  No erythema or ecchymosis.  No active drainage when dressings were removed this morning and incision is well-approximated.  Patient was noted to have some drainage after doing physical therapy later this afternoon.  Dressing was changed and currently is dry.  Sensation intact   Motor intact to, +ankle dorsi/plantar flexion  Foot warm and well perfused - absence of all toes.         Chuck King PA-C

## 2024-07-18 NOTE — PLAN OF CARE
Problem: Prexisting or High Potential for Compromised Skin Integrity  Goal: Skin integrity is maintained or improved  Description: INTERVENTIONS:  - Identify patients at risk for skin breakdown  - Assess and monitor skin integrity  - Assess and monitor nutrition and hydration status  - Monitor labs   - Assess for incontinence   - Turn and reposition patient  - Assist with mobility/ambulation  - Relieve pressure over bony prominences  - Avoid friction and shearing  - Provide appropriate hygiene as needed including keeping skin clean and dry  - Evaluate need for skin moisturizer/barrier cream  - Collaborate with interdisciplinary team   - Patient/family teaching  - Consider wound care consult   Outcome: Progressing     Problem: PAIN - ADULT  Goal: Verbalizes/displays adequate comfort level or baseline comfort level  Description: Interventions:  - Encourage patient to monitor pain and request assistance  - Assess pain using appropriate pain scale  - Administer analgesics based on type and severity of pain and evaluate response  - Implement non-pharmacological measures as appropriate and evaluate response  - Consider cultural and social influences on pain and pain management  - Notify physician/advanced practitioner if interventions unsuccessful or patient reports new pain  Outcome: Progressing     Problem: SAFETY ADULT  Goal: Patient will remain free of falls  Description: INTERVENTIONS:  - Educate patient/family on patient safety including physical limitations  - Instruct patient to call for assistance with activity   - Consult OT/PT to assist with strengthening/mobility   - Keep Call bell within reach  - Keep bed low and locked with side rails adjusted as appropriate  - Keep care items and personal belongings within reach  - Initiate and maintain comfort rounds  - Make Fall Risk Sign visible to staff  - Obtain necessary fall risk management equipment: walker  - Apply yellow socks and bracelet for high fall risk  patients  - Consider moving patient to room near nurses station  Outcome: Progressing       Problem: Knowledge Deficit  Goal: Patient/family/caregiver demonstrates understanding of disease process, treatment plan, medications, and discharge instructions  Description: Complete learning assessment and assess knowledge base.  Interventions:  - Provide teaching at level of understanding  - Provide teaching via preferred learning methods  Outcome: Progressing     Problem: METABOLIC, FLUID AND ELECTROLYTES - ADULT  Goal: Electrolytes maintained within normal limits  Description: INTERVENTIONS:  - Monitor labs and assess patient for signs and symptoms of electrolyte imbalances  - Administer electrolyte replacement as ordered  - Monitor response to electrolyte replacements, including repeat lab results as appropriate  - Instruct patient on fluid and nutrition as appropriate  Outcome: Progressing  Goal: Fluid balance maintained  Description: INTERVENTIONS:  - Monitor labs   - Monitor I/O and WT  - Instruct patient on fluid and nutrition as appropriate  - Assess for signs & symptoms of volume excess or deficit  Outcome: Progressing  Goal: Glucose maintained within target range  Description: INTERVENTIONS:  - Monitor Blood Glucose as ordered  - Assess for signs and symptoms of hyperglycemia and hypoglycemia  - Administer ordered medications to maintain glucose within target range  - Assess nutritional intake and initiate nutrition service referral as needed  Outcome: Progressing     Problem: SKIN/TISSUE INTEGRITY - ADULT  Goal: Incision(s), wounds(s) or drain site(s) healing without S/S of infection  Description: INTERVENTIONS  - Assess and document dressing, incision, wound bed, drain sites and surrounding tissue  - Provide patient and family education  - Perform skin care/dressing changes every shift  Outcome: Progressing

## 2024-07-18 NOTE — PLAN OF CARE
Problem: PHYSICAL THERAPY ADULT  Goal: Performs mobility at highest level of function for planned discharge setting.  See evaluation for individualized goals.  Description: Treatment/Interventions: Functional transfer training, LE strengthening/ROM, Elevations, Therapeutic exercise, Endurance training, Patient/family training, Equipment eval/education, Gait training, Compensatory technique education, Continued evaluation, Spoke to nursing, OT  Equipment Recommended: Walker (RW owns)       See flowsheet documentation for full assessment, interventions and recommendations.  7/18/2024 1738 by Savita Prajapati PTA  Outcome: Progressing  Note: Prognosis: Good  Problem List: Decreased strength, Decreased range of motion, Decreased endurance, Impaired balance, Decreased mobility, Decreased skin integrity, Orthopedic restrictions, Pain  Assessment: Pt seen for PT treatment session this date with interventions consisting of bed mobility, transfer training, gait training, and HEP, and education provided as needed for safety and direction to improve functional mobility, safety awareness, and activity tolerance. Pt agreeable to PT treatment session upon arrival, pt found supine in bed . At end of session, pt left  seated out of bed with all needs in reach. In comparison to previous session, pt with improvement in activity tolerance, R le strength, and functional mobility.PT  DEMONSTRATES IMPAIRMENTS IN R LE STRENGTH, ROM, FUNCTIONAL MOBILITY, SAFETY AWARENESS, JUDGEMENT AND GAIT STABILITY.  PT REQUIRES FREQUENT VERBAL CUES FOR HANDPLACEMENT and R LE POSITIONING WITH STAND TO SIT TRANSFERS. PT  MORE RECEPTIVE TO USE OF RW THIS SESSION.    PT  WITH INCREASED r KNEE FLEXION,  AND DECREASED R HEEL CONTACT/ HEEL STRIKE. ANTALGIC GAIT NOTED. PT REQUIRES FREQUENT VERBAL CUES FOR PROPER POSITIONING OF R LE AT REST AND ENCOURAGED R KNEE EXTENSION EXERCISES AND POSITIONING OF r LE IN AN EXTENDED NEUTRAL ROTATED POSITION. PT  PERFORMS  SUPINE R LE AAROM-AROM FOR TKR. PT TOLERATED INCREASED RESPS TO 12.  PT  REMAINS UNABLE TO I'LY SLR OR PERFORM SAQ.MODERATE BLOODY DRAINAGE NOTED ON ACE WRAP AND PT'S SOCK.  PT'S RN, GERALDO NOTIFIED.  LIMITED AMBULATION DISTANCES DUE TO DROPS IN BP AND (+) DIZZINESS.  Please refer to endurance deficit section of flowsheet for vitals. PT'S RN AWARE OF DROPS IN BP AND (+) DIZZINESS REPORTED WITH STANDING AND AMBULATION OF SHORT DISTANCES.  Continue to recommend  level I maximal rehab resource intensity  at time of d/c in order to maximize pt's functional independence and safety w/ mobility. Pt continues to be functioning below baseline level. PT will continue to see pt while here in order to address the deficits listed above and provide interventions consistent w/ POC in effort to achieve STGs.  Barriers to Discharge: None     Rehab Resource Intensity Level, PT: I (Maximum Resource Intensity)    See flowsheet documentation for full assessment.

## 2024-07-18 NOTE — PHYSICAL THERAPY NOTE
PHYSICAL THERAPY NOTE          Patient Name: Aaron Richards  Today's Date: 7/18/2024 07/18/24 [id number]   Note Type   Note Type BID visit/treatment   Pain Assessment   Pain Assessment Tool 0-10   Pain Score 8   Pain Location/Orientation Orientation: Right;Location: Knee   Hospital Pain Intervention(s) Ambulation/increased activity;Emotional support;Rest;Repositioned   Restrictions/Precautions   RLE Weight Bearing Per Order WBAT   Other Precautions WBS;Fall Risk;Pain   General   Chart Reviewed Yes   Family/Caregiver Present No   Cognition   Overall Cognitive Status WFL   Subjective   Subjective i WAS ON THE COMMODE.  I HAD SUCCESS.   Bed Mobility   Additional Comments PT  OUT OF BED IN RECLINER.   Transfers   Sit to Stand 4  Minimal assistance   Additional items Assist x 1;Armrests;Increased time required;Verbal cues   Stand to Sit 4  Minimal assistance   Additional items Assist x 1;Armrests;Increased time required;Verbal cues   Additional Comments INCREASED TIME TO PERFORM AND COMPLETE FROM RECLINER.   Ambulation/Elevation   Gait pattern Forward Flexion;Decreased R stance;Antalgic;Short stride;Step to;Excessively slow;Knees flexed;Decreased toe off;Decreased heel strike  (INCREASED R KNEE FLEXION)   Gait Assistance 4  Minimal assist   Additional items Assist x 1;Verbal cues   Assistive Device Rolling walker   Distance 8' X1, 10' X1 WITH CLOSE CHAIR FOLLOW DUE TO DROPS IN BP   Ambulation/Elevation Additional Comments (+) DIZZINESS WITH DROPS IN BP.   Endurance Deficit   Endurance Deficit Description (+) DIZZINESS IN STANDING  WITH DROP IN BP  FROM 127/ 80 LONGSITTING IN RECLINER,  144/ 81 IN SEATED UPRIGHT POSITION IN CHIAR, 84/ 58 IN STANDING,  BP END OF SESSION SITTED UPRIGHT POSTIION 11/ 69 POST AMBULATION.   Activity Tolerance   Activity Tolerance Treatment limited secondary to medical complications (Comment)  (HYPOTENSION  (+)  DIZZINESS)   Medical Staff Made Aware ERICA RESTORATIVE THERAPIST   Exercises   Quad Sets Sitting;AROM;Bilateral  (LONGSITTING X 12 REPS.)   Hip Flexion Sitting;AAROM;Right  (X 12 REPS  LONGSITTING SLR  AND HIP FLEXION IN UPRIGHT SEATED POSITION X 12 REPS.)   Hip Adduction Sitting;Bilateral  (X 12 REPS.  ISOMETRIC HIP ADD.)   Knee AROM Short Arc Quad Sitting;AROM;Right;AAROM  (LONGSITTING)   Knee AROM Long Arc Quad Sitting;AROM;Right  (X 12 REPS.)   Ankle Pumps Sitting;AROM;Bilateral  (X 12 REPS  LINMITED ANKLE df B/L)   Heel Cord Stretch Sitting;Right  (LONGSITTING  PASSIVE HC STRETCHES  X 4 REPS  X 15 SECOND HOLD)   Assessment   Prognosis Good   Problem List Decreased strength;Decreased range of motion;Decreased endurance;Impaired balance;Decreased mobility;Decreased skin integrity;Orthopedic restrictions;Pain   Goals   Patient Goals To be able to walk and get up and down my steps.   STG Expiration Date 07/29/24   PT Treatment Day 6   Plan   Treatment/Interventions Functional transfer training;LE strengthening/ROM;Therapeutic exercise;Endurance training;Patient/family training;Equipment eval/education;Gait training  (RESTORATIVE THERAPIST)   Progress Slow progress, medical status limitations  (hypotension with (+) dizziness)   PT Frequency 5-7x/wk;Twice a day   Discharge Recommendation   Rehab Resource Intensity Level, PT I (Maximum Resource Intensity)   Additional Comments The patient's AM-PAC Basic Mobility Inpatient Short Form Raw Score is 17. A raw score greater than 16 suggests the patient may benefit from discharge to home. Please also refer to the recommendation of the Physical Therapist for safe discharge planning.   AM-PAC Basic Mobility Inpatient   Turning in Flat Bed Without Bedrails 4   Lying on Back to Sitting on Edge of Flat Bed Without Bedrails 4   Moving Bed to Chair 3   Standing Up From Chair Using Arms 3   Walk in Room 2   Climb 3-5 Stairs With Railing 1   Basic Mobility Inpatient Raw Score 17    Basic Mobility Standardized Score 39.67   [facility] Highest Level Of Mobility   -HL Goal 5: Stand one or more mins   -HL Achieved 6: Walk 10 steps or more   Education   Education Provided Mobility training;Home exercise program;Assistive device   Patient Reinforcement needed;Demonstrates acceptance/verbal understanding   End of Consult   Patient Position at End of Consult Bedside chair;All needs within reach   End of Consult Comments LONGSITTING POSITION IN RECLINER. PT DECLINED ICE AT THIS TIME AND SCD TO [facility].     Savita Praajpati, PTA

## 2024-07-18 NOTE — PROGRESS NOTES
[facility]  Progress Note  Name: Aaron Richards I  MRN: [id number]  Unit/Bed#: E2 -01 I Date of Admission: 7/15/2024   Date of Service: 7/18/2024 I Hospital Day: 3    Assessment & Plan   * Post-traumatic osteoarthritis of right knee  Assessment & Plan  The patient underwent a rather complex total knee replacement.  He is on daptomycin because of the possibility of infection.  He is generally stable postop.  Inpatient rehab is likely necessary.    Diabetes mellitus type 2, insulin dependent (HCC)  Assessment & Plan  Lab Results   Component Value Date    HGBA1C 6.2 (H) 07/01/2024       Recent Labs     07/17/24  1542 07/17/24  2129 07/18/24  0634 07/18/24  1112   POCGLU 157* 190* 104 189*         Well-controlled.      Hypertension, essential  Assessment & Plan  The patient had been on verapamil 360 mg daily  He has orthostatic hypotension   His verapamil dose has been stopped.    Hyperlipidemia  Assessment & Plan  Continue statin therapy.    PAD (peripheral artery disease) ([facility])  Assessment & Plan  Continue appropriate risk factor modification and antiplatelet therapy.    Acquired absence of right foot ([facility])  Assessment & Plan  Noted    Alcoholism ([facility])  Assessment & Plan  The patient is currently abstinent.           Subjective:  The patient feels reasonably well overall.  He slept well.  He is eating okay.  He has no chest pain, shortness of breath, abdominal pain, nausea, or vomiting.  He became lightheaded during physical therapy yesterday.  Verapamil was reduced.  Unfortunately, he had orthostatic hypotension in therapy again today.  Therefore, verapamil has been stopped.     Physical Exam:   Temp:  [98 °F (36.7 °C)-99.3 °F (37.4 °C)] 98 °F (36.7 °C)  HR:  [79-83] 79  Resp:  [16-18] 16  BP: (109-151)/(69-79) 109/69    Gen: Well-developed, well-nourished, in no distress.  Neck: Supple.  No lymphadenopathy, goiter, or bruit.  Heart: Regular rhythm.  I heard no murmur, gallop, or  rub.  Lungs: Clear to auscultation and percussion.  No wheezing, rales, or rhonchi.  Abd: Soft with active bowel sounds.  No mass, tenderness, organomegaly.  Extremities: No clubbing, cyanosis, or edema.  No calf tenderness.  Neuro: Alert and oriented.  No focal sign.  Skin: Warm and dry.      LABS:   CBC:   Lab Results   Component Value Date    WBC 12.40 (H) 07/18/2024    HGB 9.4 (L) 07/18/2024    HCT 28.7 (L) 07/18/2024    MCV 92 07/18/2024     07/18/2024    RBC 3.12 (L) 07/18/2024    MCH 30.1 07/18/2024    MCHC 32.8 07/18/2024    RDW 13.8 07/18/2024    MPV 12.1 07/18/2024             VTE Pharmacologic Prophylaxis: Aspirin  VTE Mechanical Prophylaxis: sequential compression device

## 2024-07-18 NOTE — PLAN OF CARE
Problem: Prexisting or High Potential for Compromised Skin Integrity  Goal: Skin integrity is maintained or improved  Description: INTERVENTIONS:  - Identify patients at risk for skin breakdown  - Assess and monitor skin integrity  - Assess and monitor nutrition and hydration status  - Monitor labs   - Assess for incontinence   - Turn and reposition patient  - Assist with mobility/ambulation  - Relieve pressure over bony prominences  - Avoid friction and shearing  - Provide appropriate hygiene as needed including keeping skin clean and dry  - Evaluate need for skin moisturizer/barrier cream  - Collaborate with interdisciplinary team   - Patient/family teaching  - Consider wound care consult   Outcome: Progressing     Problem: PAIN - ADULT  Goal: Verbalizes/displays adequate comfort level or baseline comfort level  Description: Interventions:  - Encourage patient to monitor pain and request assistance  - Assess pain using appropriate pain scale  - Administer analgesics based on type and severity of pain and evaluate response  - Implement non-pharmacological measures as appropriate and evaluate response  - Consider cultural and social influences on pain and pain management  - Notify physician/advanced practitioner if interventions unsuccessful or patient reports new pain  Outcome: Progressing     Problem: SAFETY ADULT  Goal: Patient will remain free of falls  Description: INTERVENTIONS:  - Educate patient/family on patient safety including physical limitations  - Instruct patient to call for assistance with activity   - Consult OT/PT to assist with strengthening/mobility   - Keep Call bell within reach  - Keep bed low and locked with side rails adjusted as appropriate  - Keep care items and personal belongings within reach  - Initiate and maintain comfort rounds  - Make Fall Risk Sign visible to staff  - Offer Toileting every 2-4 Hours, in advance of need  - Initiate/Maintain bed and chair alarm  - Obtain necessary  fall risk management equipment: nonskid socks   - Apply yellow socks and bracelet for high fall risk patients  - Consider moving patient to room near nurses station  Outcome: Progressing     Problem: DISCHARGE PLANNING  Goal: Discharge to home or other facility with appropriate resources  Description: INTERVENTIONS:  - Identify barriers to discharge w/patient and caregiver  - Arrange for needed discharge resources and transportation as appropriate  - Identify discharge learning needs (meds, wound care, etc.)  - Arrange for interpretive services to assist at discharge as needed  - Refer to Case Management Department for coordinating discharge planning if the patient needs post-hospital services based on physician/advanced practitioner order or complex needs related to functional status, cognitive ability, or social support system  Outcome: Progressing     Problem: Knowledge Deficit  Goal: Patient/family/caregiver demonstrates understanding of disease process, treatment plan, medications, and discharge instructions  Description: Complete learning assessment and assess knowledge base.  Interventions:  - Provide teaching at level of understanding  - Provide teaching via preferred learning methods  Outcome: Progressing     Problem: GASTROINTESTINAL - ADULT  Goal: Maintains or returns to baseline bowel function  Description: INTERVENTIONS:  - Assess bowel function  - Encourage oral fluids to ensure adequate hydration  - Administer IV fluids if ordered to ensure adequate hydration  - Administer ordered medications as needed  - Encourage mobilization and activity  - Consider nutritional services referral to assist patient with adequate nutrition and appropriate food choices  Outcome: Progressing  Goal: Maintains adequate nutritional intake  Description: INTERVENTIONS:  - Monitor percentage of each meal consumed  - Identify factors contributing to decreased intake, treat as appropriate  - Assist with meals as needed  -  Monitor I&O, weight, and lab values if indicated  - Obtain nutrition services referral as needed  Outcome: Progressing     Problem: GENITOURINARY - ADULT  Goal: Maintains or returns to baseline urinary function  Description: INTERVENTIONS:  - Assess urinary function  - Encourage oral fluids to ensure adequate hydration if ordered  - Administer IV fluids as ordered to ensure adequate hydration  - Administer ordered medications as needed  - Offer frequent toileting  - Follow urinary retention protocol if ordered  Outcome: Progressing     Problem: METABOLIC, FLUID AND ELECTROLYTES - ADULT  Goal: Electrolytes maintained within normal limits  Description: INTERVENTIONS:  - Monitor labs and assess patient for signs and symptoms of electrolyte imbalances  - Administer electrolyte replacement as ordered  - Monitor response to electrolyte replacements, including repeat lab results as appropriate  - Instruct patient on fluid and nutrition as appropriate  Outcome: Progressing  Goal: Fluid balance maintained  Description: INTERVENTIONS:  - Monitor labs   - Monitor I/O and WT  - Instruct patient on fluid and nutrition as appropriate  - Assess for signs & symptoms of volume excess or deficit  Outcome: Progressing  Goal: Glucose maintained within target range  Description: INTERVENTIONS:  - Monitor Blood Glucose as ordered  - Assess for signs and symptoms of hyperglycemia and hypoglycemia  - Administer ordered medications to maintain glucose within target range  - Assess nutritional intake and initiate nutrition service referral as needed  Outcome: Progressing     Problem: SKIN/TISSUE INTEGRITY - ADULT  Goal: Incision(s), wounds(s) or drain site(s) healing without S/S of infection  Description: INTERVENTIONS  - Assess and document dressing, incision, wound bed, drain sites and surrounding tissue  - Provide patient and family education  - Perform skin care/dressing changes every day   Outcome: Progressing

## 2024-07-19 ENCOUNTER — APPOINTMENT (INPATIENT)
Dept: RADIOLOGY | Facility: HOSPITAL | Age: 59
End: 2024-07-19
Payer: MEDICARE

## 2024-07-19 PROBLEM — M00.9 SEPTIC ARTHRITIS OF KNEE (HCC): Status: ACTIVE | Noted: 2024-02-06

## 2024-07-19 LAB
GLUCOSE SERPL-MCNC: 106 MG/DL (ref 65–140)
GLUCOSE SERPL-MCNC: 187 MG/DL (ref 65–140)
GLUCOSE SERPL-MCNC: 215 MG/DL (ref 65–140)
GLUCOSE SERPL-MCNC: 223 MG/DL (ref 65–140)

## 2024-07-19 PROCEDURE — 97110 THERAPEUTIC EXERCISES: CPT

## 2024-07-19 PROCEDURE — 99024 POSTOP FOLLOW-UP VISIT: CPT

## 2024-07-19 PROCEDURE — 71045 X-RAY EXAM CHEST 1 VIEW: CPT

## 2024-07-19 PROCEDURE — 99232 SBSQ HOSP IP/OBS MODERATE 35: CPT | Performed by: INTERNAL MEDICINE

## 2024-07-19 PROCEDURE — 97116 GAIT TRAINING THERAPY: CPT

## 2024-07-19 PROCEDURE — 97530 THERAPEUTIC ACTIVITIES: CPT

## 2024-07-19 PROCEDURE — 82948 REAGENT STRIP/BLOOD GLUCOSE: CPT

## 2024-07-19 PROCEDURE — C1751 CATH, INF, PER/CENT/MIDLINE: HCPCS

## 2024-07-19 PROCEDURE — 97535 SELF CARE MNGMENT TRAINING: CPT

## 2024-07-19 PROCEDURE — 36569 INSJ PICC 5 YR+ W/O IMAGING: CPT

## 2024-07-19 RX ORDER — LANOLIN ALCOHOL/MO/W.PET/CERES
100 CREAM (GRAM) TOPICAL DAILY
Status: DISCONTINUED | OUTPATIENT
Start: 2024-07-19 | End: 2024-07-21 | Stop reason: HOSPADM

## 2024-07-19 RX ORDER — LOSARTAN POTASSIUM 25 MG/1
25 TABLET ORAL DAILY
Status: DISCONTINUED | OUTPATIENT
Start: 2024-07-20 | End: 2024-07-21 | Stop reason: HOSPADM

## 2024-07-19 RX ADMIN — ACETAMINOPHEN 975 MG: 325 TABLET, FILM COATED ORAL at 14:29

## 2024-07-19 RX ADMIN — Medication 1 TABLET: at 07:53

## 2024-07-19 RX ADMIN — ATORVASTATIN CALCIUM 40 MG: 40 TABLET, FILM COATED ORAL at 16:09

## 2024-07-19 RX ADMIN — ONDANSETRON 4 MG: 2 INJECTION INTRAMUSCULAR; INTRAVENOUS at 08:32

## 2024-07-19 RX ADMIN — DAPTOMYCIN 575 MG: 500 INJECTION, POWDER, LYOPHILIZED, FOR SOLUTION INTRAVENOUS at 16:35

## 2024-07-19 RX ADMIN — HYDROMORPHONE HYDROCHLORIDE 2 MG: 2 TABLET ORAL at 20:07

## 2024-07-19 RX ADMIN — ASPIRIN 81 MG: 81 TABLET, COATED ORAL at 16:09

## 2024-07-19 RX ADMIN — OXYCODONE HYDROCHLORIDE AND ACETAMINOPHEN 500 MG: 500 TABLET ORAL at 07:55

## 2024-07-19 RX ADMIN — INSULIN GLARGINE 25 UNITS: 100 INJECTION, SOLUTION SUBCUTANEOUS at 22:41

## 2024-07-19 RX ADMIN — INSULIN LISPRO 2 UNITS: 100 INJECTION, SOLUTION INTRAVENOUS; SUBCUTANEOUS at 12:12

## 2024-07-19 RX ADMIN — ESCITALOPRAM 5 MG: 5 TABLET, FILM COATED ORAL at 07:53

## 2024-07-19 RX ADMIN — THIAMINE HCL TAB 100 MG 100 MG: 100 TAB at 20:08

## 2024-07-19 RX ADMIN — ASPIRIN 81 MG: 81 TABLET, COATED ORAL at 07:54

## 2024-07-19 RX ADMIN — INSULIN GLARGINE 25 UNITS: 100 INJECTION, SOLUTION SUBCUTANEOUS at 07:53

## 2024-07-19 RX ADMIN — ACETAMINOPHEN 975 MG: 325 TABLET, FILM COATED ORAL at 22:41

## 2024-07-19 RX ADMIN — OXYCODONE HYDROCHLORIDE AND ACETAMINOPHEN 500 MG: 500 TABLET ORAL at 16:09

## 2024-07-19 RX ADMIN — PANTOPRAZOLE SODIUM 40 MG: 40 TABLET, DELAYED RELEASE ORAL at 07:53

## 2024-07-19 RX ADMIN — ACETAMINOPHEN 975 MG: 325 TABLET, FILM COATED ORAL at 07:19

## 2024-07-19 RX ADMIN — LEVOTHYROXINE SODIUM 175 MCG: 125 TABLET ORAL at 05:46

## 2024-07-19 RX ADMIN — FOLIC ACID 1 MG: 1 TABLET ORAL at 07:54

## 2024-07-19 RX ADMIN — INSULIN LISPRO 1 UNITS: 100 INJECTION, SOLUTION INTRAVENOUS; SUBCUTANEOUS at 16:35

## 2024-07-19 NOTE — ASSESSMENT & PLAN NOTE
Patient was admitted to the orthopedic surgery service with right knee septic arthritis  Patient had previous tibial fracture surgery, and subsequent injury after a fall  Had hardware failure requiring hardware removal  Was noted to have a sinus tract extending into the joint space  Patient underwent I&D and spacer placement 7/15: Intraoperative cultures no growth thus far  Patient is currently on daptomycin with anticipated 6-week of treatment: Through 8/26  PICC line placed 7/19

## 2024-07-19 NOTE — PLAN OF CARE
Problem: Prexisting or High Potential for Compromised Skin Integrity  Goal: Skin integrity is maintained or improved  Description: INTERVENTIONS:  - Identify patients at risk for skin breakdown  - Assess and monitor skin integrity  - Assess and monitor nutrition and hydration status  - Monitor labs   - Assess for incontinence   - Turn and reposition patient  - Assist with mobility/ambulation  - Relieve pressure over bony prominences  - Avoid friction and shearing  - Provide appropriate hygiene as needed including keeping skin clean and dry  - Evaluate need for skin moisturizer/barrier cream  - Collaborate with interdisciplinary team   - Patient/family teaching  - Consider wound care consult   Outcome: Progressing     Problem: PAIN - ADULT  Goal: Verbalizes/displays adequate comfort level or baseline comfort level  Description: Interventions:  - Encourage patient to monitor pain and request assistance  - Assess pain using appropriate pain scale  - Administer analgesics based on type and severity of pain and evaluate response  - Implement non-pharmacological measures as appropriate and evaluate response  - Consider cultural and social influences on pain and pain management  - Notify physician/advanced practitioner if interventions unsuccessful or patient reports new pain  Outcome: Progressing     Problem: SAFETY ADULT  Goal: Patient will remain free of falls  Description: INTERVENTIONS:  - Educate patient/family on patient safety including physical limitations  - Instruct patient to call for assistance with activity   - Consult OT/PT to assist with strengthening/mobility   - Keep Call bell within reach  - Keep bed low and locked with side rails adjusted as appropriate  - Keep care items and personal belongings within reach  - Initiate and maintain comfort rounds  - Make Fall Risk Sign visible to staff  - Offer Toileting every 2 Hours, in advance of need  - Initiate/Maintain Bed alarm  - Obtain necessary fall risk  management equipment: Walker  - Apply yellow socks and bracelet for high fall risk patients  - Consider moving patient to room near nurses station  Outcome: Progressing     Problem: DISCHARGE PLANNING  Goal: Discharge to home or other facility with appropriate resources  Description: INTERVENTIONS:  - Identify barriers to discharge w/patient and caregiver  - Arrange for needed discharge resources and transportation as appropriate  - Identify discharge learning needs (meds, wound care, etc.)  - Arrange for interpretive services to assist at discharge as needed  - Refer to Case Management Department for coordinating discharge planning if the patient needs post-hospital services based on physician/advanced practitioner order or complex needs related to functional status, cognitive ability, or social support system  Outcome: Progressing     Problem: Knowledge Deficit  Goal: Patient/family/caregiver demonstrates understanding of disease process, treatment plan, medications, and discharge instructions  Description: Complete learning assessment and assess knowledge base.  Interventions:  - Provide teaching at level of understanding  - Provide teaching via preferred learning methods  Outcome: Progressing     Problem: GASTROINTESTINAL - ADULT  Goal: Maintains or returns to baseline bowel function  Description: INTERVENTIONS:  - Assess bowel function  - Encourage oral fluids to ensure adequate hydration  - Administer IV fluids if ordered to ensure adequate hydration  - Administer ordered medications as needed  - Encourage mobilization and activity  - Consider nutritional services referral to assist patient with adequate nutrition and appropriate food choices  Outcome: Progressing  Goal: Maintains adequate nutritional intake  Description: INTERVENTIONS:  - Monitor percentage of each meal consumed  - Identify factors contributing to decreased intake, treat as appropriate  - Assist with meals as needed  - Monitor I&O, weight,  and lab values if indicated  - Obtain nutrition services referral as needed  Outcome: Progressing     Problem: GENITOURINARY - ADULT  Goal: Maintains or returns to baseline urinary function  Description: INTERVENTIONS:  - Assess urinary function  - Encourage oral fluids to ensure adequate hydration if ordered  - Administer IV fluids as ordered to ensure adequate hydration  - Administer ordered medications as needed  - Offer frequent toileting  - Follow urinary retention protocol if ordered  Outcome: Progressing

## 2024-07-19 NOTE — PLAN OF CARE
Problem: Knowledge Deficit  Goal: Patient/family/caregiver demonstrates understanding of disease process, treatment plan, medications, and discharge instructions  Description: Complete learning assessment and assess knowledge base.  Interventions:  Picc procedure and maintenace  - Provide teaching at level of understanding  - Provide teaching via preferred learning methods  Outcome: Progressing     Problem: INFECTION - ADULT  Goal: Absence or prevention of progression during hospitalization  Description: INTERVENTIONS:  Picc procedure and maintenance  - Assess and monitor for signs and symptoms of infection  - Monitor lab/diagnostic results  - Monitor all insertion sites, i.e. indwelling lines, tubes, and drains  - Monitor endotracheal if appropriate and nasal secretions for changes in amount and color  - [place] appropriate cooling/warming therapies per order  - Administer medications as ordered  - Instruct and encourage patient and family to use good hand hygiene technique  - Identify and instruct in appropriate isolation precautions for identified infection/condition  Outcome: Progressing

## 2024-07-19 NOTE — PROGRESS NOTES
Progress Note - Infectious Disease   Aaron Richards 59 y.o. male MRN: [id number]  Unit/Bed#: E2 -01 Encounter: [id number]    Impression/Plan:  1. R knee septic arthritis.  In the setting of previous traumatic injury with hardware failure requiring hardware removal. Has has now developed a sinus tract extending into the joint space. Patient with many previous cultures although they were all negative as they were obtained while he was on antibiotics.  He has now been off antibiotics since 7/2/2024. He is s/p I&D and placement of a spacer on 7/15/2024. Operative findings consistent with a septic arthritis. Multiple new cultures were obtained but they showed no growth. I have requested the lab hold tissue cultures for 14 days. The patient has been started on Daptomycin which he is so far tolerating without difficulty. Baseline CK = 256. I will continue patient on treatment for now with plan to complete 6 weeks of treatment. Patient is comfortable with PICC line placement and would prefer to complete his antibiotic treatment at rehab vs the infusion center after discharge.   -continue IV Daptomycin through 8/26/2024 for 6 weeks of post-op antibiotic   -weekly CBCD, creatinine, and CK while on antibiotic treatment  -follow up operative cultures, being held in lab x14 days  -monitor vitals  -serial RLE exams  -surgical site care per orthopedics  -continue follow up with orthopedics  -patient is cleared for PICC placement  -PICC to be removed by RN after final dose of IV antibiotic on 8/26/2024  -patient to follow up with the ID office after discharge     2. Poor dentition. Patient with new hardware in the R knee.  -recommend OMFS evaluation for possible extractions     3. Type 2 diabetes mellitus with neuropathy and long term insulin use. Patient's last HbA1c was 6.2% on 7/1/2024. Elevated blood glucose is risk factor for wounds and infection. SLIM is on board for medical management.  -recommend tight glycemic  control  -blood glucose management per primary service     4. CKD stage 3. This can impact antibiotic dosing. Upon review of patient's available past medical records it appears his baseline creatinine is approximately 0.6-0.9 Creatinine has remained within baseline throughout this hospitalization.  -monitor creatinine  -dose adjust antibiotic for renal function as needed  -avoid nephrotoxins      5. Antibiotic allergy. Patient reports kidney issue with use of vancomycin. We will avoid this antibiotic for now.  -monitor patient for adverse medication reactions    Above plan was discussed in detail with patient at the bedside.  Above plan was discussed in detail with orthopedics who agrees with plan for ongoing IV antibiotic treatment.     Antibiotics:  Daptomycin 5  Antibiotics 5  Post op #4    Subjective:  Patient reports he's been feeling dizzy and nauseous this morning. Reports he felt it the second he woke up. Reports he thinks it was due to a double dose of Dilaudid he took for pain yesterday. Was having increased pain in the R knee after PT and reports he got 4 of dilaudid compared to the 2 he was getting before. He has no fever, chills, sweats, shakes; no abdominal pain or diarrhea; no dysuria; no cough, shortness of breath, or chest pain. He reports no other acute symptoms.    Objective:  Vitals:  Temp:  [97.8 °F (36.6 °C)-98.5 °F (36.9 °C)] 98.5 °F (36.9 °C)  HR:  [79-92] 92  Resp:  [16-18] 18  BP: (109-146)/(69-90) 146/90  SpO2:  [95 %-97 %] 96 %  Temp (24hrs), Av.1 °F (36.7 °C), Min:97.8 °F (36.6 °C), Max:98.5 °F (36.9 °C)  Current: Temperature: 98.5 °F (36.9 °C)    Physical Exam:   General Appearance:  Alert, interactive, nontoxic, no acute distress. He is wearing his glasses. He appears comfortable sitting up in bed.   Throat: Oropharynx moist without lesions.    Lungs:   Respirations unlabored on room air.   Abdomen:   Soft, non-tender, non-distended, positive bowel sounds.     Extremities: R knee  dressings intact with overlying ace wrap, no breakthrough drainage, no spreading erythema from the bandage site.   Skin: No new rashes noted on exposed skin.     Labs, Imaging, & Other studies:   All pertinent labs and imaging studies were personally reviewed  Results from last 7 days   Lab Units 07/18/24  0520 07/17/24  0610 07/16/24  0426   WBC Thousand/uL 12.40* 11.62* 11.21*   HEMOGLOBIN g/dL 9.4* 9.7* 11.1*   PLATELETS Thousands/uL 172 156 185     Results from last 7 days   Lab Units 07/17/24  0610   POTASSIUM mmol/L 3.6   CHLORIDE mmol/L 107   CO2 mmol/L 24   BUN mg/dL 22   CREATININE mg/dL 0.71   EGFR ml/min/1.73sq m 101   CALCIUM mg/dL 8.2*     Results from last 7 days   Lab Units 07/15/24  1152 07/15/24  1150 07/15/24  1149 07/15/24  1146 07/15/24  1141   GRAM STAIN RESULT  Rare Polys  No bacteria seen No Polys or Bacteria seen Rare Polys  No bacteria seen No Polys or Bacteria seen No Polys or Bacteria seen

## 2024-07-19 NOTE — PLAN OF CARE
Problem: Prexisting or High Potential for Compromised Skin Integrity  Goal: Skin integrity is maintained or improved  Description: INTERVENTIONS:  - Identify patients at risk for skin breakdown  - Assess and monitor skin integrity  - Assess and monitor nutrition and hydration status  - Monitor labs   - Assess for incontinence   - Turn and reposition patient  - Assist with mobility/ambulation  - Relieve pressure over bony prominences  - Avoid friction and shearing  - Provide appropriate hygiene as needed including keeping skin clean and dry  - Evaluate need for skin moisturizer/barrier cream  - Collaborate with interdisciplinary team   - Patient/family teaching  - Consider wound care consult   Outcome: Progressing     Problem: PAIN - ADULT  Goal: Verbalizes/displays adequate comfort level or baseline comfort level  Description: Interventions:  - Encourage patient to monitor pain and request assistance  - Assess pain using appropriate pain scale  - Administer analgesics based on type and severity of pain and evaluate response  - Implement non-pharmacological measures as appropriate and evaluate response  - Consider cultural and social influences on pain and pain management  - Notify physician/advanced practitioner if interventions unsuccessful or patient reports new pain  Outcome: Progressing     Problem: SAFETY ADULT  Goal: Patient will remain free of falls  Description: INTERVENTIONS:  - Educate patient/family on patient safety including physical limitations  - Instruct patient to call for assistance with activity   - Consult OT/PT to assist with strengthening/mobility   - Keep Call bell within reach  - Keep bed low and locked with side rails adjusted as appropriate  - Keep care items and personal belongings within reach  - Initiate and maintain comfort rounds  - Make Fall Risk Sign visible to staff  - Offer Toileting every  Hours, in advance of need  - Initiate/Maintain alarm  - Obtain necessary fall risk  management equipme  - Apply yellow socks and bracelet for high fall risk patients  - Consider moving patient to room near nurses station  Outcome: Progressing     Problem: DISCHARGE PLANNING  Goal: Discharge to home or other facility with appropriate resources  Description: INTERVENTIONS:  - Identify barriers to discharge w/patient and caregiver  - Arrange for needed discharge resources and transportation as appropriate  - Identify discharge learning needs (meds, wound care, etc.)  - Arrange for interpretive services to assist at discharge as needed  - Refer to Case Management Department for coordinating discharge planning if the patient needs post-hospital services based on physician/advanced practitioner order or complex needs related to functional status, cognitive ability, or social support system  Outcome: Progressing     Problem: Knowledge Deficit  Goal: Patient/family/caregiver demonstrates understanding of disease process, treatment plan, medications, and discharge instructions  Description: Complete learning assessment and assess knowledge base.  Interventions:  - Provide teaching at level of understanding  - Provide teaching via preferred learning methods  Outcome: Progressing     Problem: GASTROINTESTINAL - ADULT  Goal: Maintains or returns to baseline bowel function  Description: INTERVENTIONS:  - Assess bowel function  - Encourage oral fluids to ensure adequate hydration  - Administer IV fluids if ordered to ensure adequate hydration  - Administer ordered medications as needed  - Encourage mobilization and activity  - Consider nutritional services referral to assist patient with adequate nutrition and appropriate food choices  Outcome: Progressing  Goal: Maintains adequate nutritional intake  Description: INTERVENTIONS:  - Monitor percentage of each meal consumed  - Identify factors contributing to decreased intake, treat as appropriate  - Assist with meals as needed  - Monitor I&O, weight, and lab  values if indicated  - Obtain nutrition services referral as needed  Outcome: Progressing     Problem: GENITOURINARY - ADULT  Goal: Maintains or returns to baseline urinary function  Description: INTERVENTIONS:  - Assess urinary function  - Encourage oral fluids to ensure adequate hydration if ordered  - Administer IV fluids as ordered to ensure adequate hydration  - Administer ordered medications as needed  - Offer frequent toileting  - Follow urinary retention protocol if ordered  Outcome: Progressing     Problem: METABOLIC, FLUID AND ELECTROLYTES - ADULT  Goal: Electrolytes maintained within normal limits  Description: INTERVENTIONS:  - Monitor labs and assess patient for signs and symptoms of electrolyte imbalances  - Administer electrolyte replacement as ordered  - Monitor response to electrolyte replacements, including repeat lab results as appropriate  - Instruct patient on fluid and nutrition as appropriate  Outcome: Progressing  Goal: Fluid balance maintained  Description: INTERVENTIONS:  - Monitor labs   - Monitor I/O and WT  - Instruct patient on fluid and nutrition as appropriate  - Assess for signs & symptoms of volume excess or deficit  Outcome: Progressing  Goal: Glucose maintained within target range  Description: INTERVENTIONS:  - Monitor Blood Glucose as ordered  - Assess for signs and symptoms of hyperglycemia and hypoglycemia  - Administer ordered medications to maintain glucose within target range  - Assess nutritional intake and initiate nutrition service referral as needed  Outcome: Progressing     Problem: SKIN/TISSUE INTEGRITY - ADULT  Goal: Incision(s), wounds(s) or drain site(s) healing without S/S of infection  Description: INTERVENTIONS  - Assess and document dressing, incision, wound bed, drain sites and surrounding tissue  - Provide patient and family education  - Perform skin care/dressing changes every   Outcome: Progressing

## 2024-07-19 NOTE — ASSESSMENT & PLAN NOTE
Patient has reported previous history of alcohol abuse, currently abstinent  Will continue thiamine and folic acid empirically  No signs or symptoms of alcohol withdrawal

## 2024-07-19 NOTE — PROCEDURES
Insert Complex Venous Access Line    Date/Time: 7/19/2024 10:43 AM    Performed by: Angela aCnales RN  Authorized by: Chuck King PA-C    Patient location:  Bedside  Consent:     Consent obtained:  Written    Consent given by:  Patient    Procedural risks discussed: consent obtained by GABRIEL King.  Universal protocol:     Procedure explained and questions answered to patient or proxy's satisfaction: yes      Immediately prior to procedure, a time out was called: yes      Relevant documents present and verified: yes      Test results available and properly labeled: yes      Radiology Images displayed and confirmed.  If images not available, report reviewed: yes      Required blood products, implants, devices, and special equipment available: yes      Site/side marked: yes      Patient identity confirmed:  Verbally with patient, arm band, provided demographic data and hospital-assigned identification number  Pre-procedure details:     Hand hygiene: Hand hygiene performed prior to insertion      Sterile barrier technique: All elements of maximal sterile technique followed      Skin preparation:  ChloraPrep    Skin preparation agent: Skin preparation agent completely dried prior to procedure    Procedure details:     Complex Venous Access Line Type: PICC      Complex Venous Access Line Indications: long term antibiotics      Catheter tip vessel location: subclavian vein      Orientation:  Right    Location:  Basilic    Procedural supplies:  Single lumen    Catheter size:  4 Fr    Total catheter length (cm):  41    Catheter out on skin (cm):  0    Max flow rate:  999ml/jr    Arm circumference:  39cm    Patient evaluated for contraindications to access (i.e. fistula, thrombosis, etc): Yes      Approach: percutaneous technique used      Patient position:  Flat    Ultrasound image availability:  Not saved    Sterile ultrasound techniques: Sterile gel and sterile probe covers were used      Number of attempts:   1    Successful placement: yes      Landmarks identified: yes      Vessel of catheter tip end:  Chest Xray needed to confirm placement  Anesthesia (see MAR for exact dosages):     Anesthesia method:  Local infiltration (3ml)    Local anesthetic:  Lidocaine 1% w/o epi  Post-procedure details:     Post-procedure:  Dressing applied and securement device placed    Assessment:  Blood return through all ports, free fluid flow and placement verified by x-ray (cxr confirmed picc terminates svc, picc okay to be utilized)    Post-procedure complications: none      Patient tolerance of procedure:  Tolerated well, no immediate complications    ID confirmed pt cleared for picc insertion  Lot#[id number] 2025-10-31

## 2024-07-19 NOTE — ASSESSMENT & PLAN NOTE
Patient records reviewed: Patient was previously on irbesartan-hydrochlorothiazide 150/12.5 mg daily, and verapamil 260 mg daily  Patient's verapamil has been held for orthostatic hypotension  Reviewed patient's patient cardiology office note from 4/27/2023: Patient does not have any other indication for verapamil and will not restart this medication due to potential side effects  Blood pressure noted to be increasing, will restart an ARB: cozaar 25mg daily  Monitor blood pressure and titrate as needed

## 2024-07-19 NOTE — ASSESSMENT & PLAN NOTE
Lab Results   Component Value Date    HGBA1C 6.2 (H) 07/01/2024       Recent Labs     07/18/24  2101 07/19/24  0758 07/19/24  1201 07/19/24  1544   POCGLU 175* 106 215* 187*     Home regimen Lantus 45 units every 12 hours, and Apidra sliding scale insulin  On decreased dose of 25 units every 12 hours due to restricted hospital diet  Blood sugars adequately controlled  Continue Accu-Cheks, and sliding scale insulin

## 2024-07-19 NOTE — PLAN OF CARE
Problem: OCCUPATIONAL THERAPY ADULT  Goal: Performs self-care activities at highest level of function for planned discharge setting.  See evaluation for individualized goals.  Description: Treatment Interventions: ADL retraining, Functional transfer training, UE strengthening/ROM, Endurance training, Patient/family training, Equipment evaluation/education, Neuromuscular reeducation, Compensatory technique education, Energy conservation, Activityengagement          See flowsheet documentation for full assessment, interventions and recommendations.   Outcome: Progressing  Note: Limitation: Decreased ADL status, Decreased UE strength, Decreased Safe judgement during ADL, Decreased endurance, Decreased self-care trans, Decreased high-level ADLs  Prognosis: Good  Assessment: Pt seen for skilled OT tx this date. Tx focused on improving strength, activity tolerance and balance, safety awareness to increase independence with self care tasks. Pt tolerated session well. Pt was limited by pain and orthostasis. Pt performed UB dressing/ bathing with Cy, LB dressing / bathing supervision for socks and shoes, transfers  La, mobility with RW La with chair follow. Pt demonstrated poor+ standing balance during functional tasks.Pt demonstrated ability to safely and appropriately attend to all tasks during session. Pt required minimal verbal cuing during session to safely complete tasks. Vitals: seated /79> standing 101/64, post mobility seated 123/91.  Current OT DC recommendations for pt is level 1 resources.     Rehab Resource Intensity Level, OT: I (Maximum Resource Intensity)

## 2024-07-19 NOTE — ASSESSMENT & PLAN NOTE
Patient was seen by vascular 4/2024  Patient with peripheral arterial disease, with previous right popliteal artery stent, and subsequent TMA  On aspirin/statin/prophylaxis prior to admission   Currently on aspirin, and statin

## 2024-07-19 NOTE — ASSESSMENT & PLAN NOTE
Lab Results   Component Value Date    EGFR 101 07/17/2024    EGFR 97 07/16/2024    EGFR 94 07/01/2024    CREATININE 0.71 07/17/2024    CREATININE 0.78 07/16/2024    CREATININE 0.83 07/01/2024   Creatinine currently at baseline

## 2024-07-19 NOTE — OCCUPATIONAL THERAPY NOTE
Occupational Therapy Progress Note     Patient Name: Aaron Richards  Today's Date: 7/19/2024  Problem List  Principal Problem:    Post-traumatic osteoarthritis of right knee  Active Problems:    Hypertension, essential    Hyperlipidemia    PAD (peripheral artery disease) (HCC)    Thyroid cancer (HCC)    Acquired absence of right foot (HCC)    Alcoholism (HCC)    Diabetes mellitus type 2, insulin dependent (HCC)            07/19/24 1432   OT Last Visit   OT Visit Date 07/19/24   Note Type   Note Type Treatment   Pain Assessment   Pain Assessment Tool 0-10   Pain Score 7   Pain Location/Orientation Orientation: Right;Location: Knee;Location: Incision   Restrictions/Precautions   Weight Bearing Precautions Per Order Yes   RLE Weight Bearing Per Order WBAT   Other Precautions WBS;Pain;Fall Risk  (monitor BP)   ADL   UB Dressing Assistance 6  Modified independent   UB Dressing Deficit Setup   LB Dressing Assistance 5  Supervision/Setup   LB Dressing Deficit Setup;Don/doff R sock;Don/doff R shoe   Functional Standing Tolerance   Time 2min   Activity self care tasks, mobility. static standing.   Comments RW for support.   Transfers   Sit to Stand 4  Minimal assistance   Additional items Assist x 1;Armrests;Increased time required;Verbal cues   Stand to Sit 4  Minimal assistance   Additional items Assist x 1;Increased time required;Verbal cues   Additional Comments cues for safety, hand placement and best tech.   Functional Mobility   Functional Mobility   (CGA- La)   Additional Comments RW, short functional distances.   Additional items Rolling walker   Cognition   Overall Cognitive Status WFL   Arousal/Participation Cooperative;Responsive;Alert   Attention Within functional limits   Orientation Level Oriented X4   Memory Within functional limits   Following Commands Follows all commands and directions without difficulty   Activity Tolerance   Activity Tolerance Patient limited by pain   Medical Staff Made Aware PTA  Savita RN   Assessment   Assessment Pt seen for skilled OT tx this date. Tx focused on improving strength, activity tolerance and balance, safety awareness to increase independence with self care tasks. Pt tolerated session well. Pt was limited by pain and orthostasis. Pt performed UB dressing/ bathing with Cy, LB dressing / bathing supervision for socks and shoes, transfers  La, mobility with RW La with chair follow. Pt demonstrated poor+ standing balance during functional tasks.Pt demonstrated ability to safely and appropriately attend to all tasks during session. Pt required minimal verbal cuing during session to safely complete tasks. Vitals: seated /79> standing 101/64, post mobility seated 123/91.  Current OT DC recommendations for pt is level 1 resources.   Plan   Treatment Interventions ADL retraining;Functional transfer training;UE strengthening/ROM;Endurance training;Patient/family training;Equipment evaluation/education;Neuromuscular reeducation;Compensatory technique education;Energy conservation;Activityengagement   Goal Expiration Date 07/30/24   OT Treatment Day 2   OT Frequency 3-5x/wk   Discharge Recommendation   Rehab Resource Intensity Level, OT I (Maximum Resource Intensity)   Additional Comments  The patient's raw score on the -PAC Daily Activity Inpatient Short Form is 19. A raw score of greater than or equal to 19 suggests the patient may benefit from discharge to home. Please refer to the recommendation of the Occupational Therapist for safe discharge planning.   AM-PAC Daily Activity Inpatient   Lower Body Dressing 2   Bathing 3   Toileting 3   Upper Body Dressing 3   Grooming 4   Eating 4   Daily Activity Raw Score 19   Daily Activity Standardized Score (Calc for Raw Score >=11) 40.22   AM-PAC Applied Cognition Inpatient   Following a Speech/Presentation 4   Understanding Ordinary Conversation 4   Taking Medications 4   Remembering Where Things Are Placed or Put Away 4    Remembering List of 4-5 Errands 4   Taking Care of Complicated Tasks 4   Applied Cognition Raw Score 24   Applied Cognition Standardized Score 62.21     Rosanne Rios, OT

## 2024-07-19 NOTE — PROGRESS NOTES
[facility]  Progress Note  Name: Aaron Richards I  MRN: [id number]  Unit/Bed#: E2 -01 I Date of Admission: 7/15/2024   Date of Service: 7/19/2024 I Hospital Day: 4    Assessment & Plan   * Septic arthritis of knee (HCC)  Assessment & Plan  Patient was admitted to the orthopedic surgery service with right knee septic arthritis  Patient had previous tibial fracture surgery, and subsequent injury after a fall  Had hardware failure requiring hardware removal  Was noted to have a sinus tract extending into the joint space  Patient underwent I&D and spacer placement 7/15: Intraoperative cultures no growth thus far  Patient is currently on daptomycin with anticipated 6-week of treatment: Through 8/26  PICC line placed 7/19    Type 2 diabetes mellitus without complication, with long-term current use of insulin ([facility])  Assessment & Plan  Lab Results   Component Value Date    HGBA1C 6.2 (H) 07/01/2024       Recent Labs     07/18/24  2101 07/19/24  0758 07/19/24  1201 07/19/24  1544   POCGLU 175* 106 215* 187*     Home regimen Lantus 45 units every 12 hours, and Apidra sliding scale insulin  On decreased dose of 25 units every 12 hours due to restricted hospital diet  Blood sugars adequately controlled  Continue Accu-Cheks, and sliding scale insulin      Alcoholism ([facility])  Assessment & Plan  Patient has reported previous history of alcohol abuse, currently abstinent  Will continue thiamine and folic acid empirically  No signs or symptoms of alcohol withdrawal    Stage 3b chronic kidney disease (HCC)  Assessment & Plan  Lab Results   Component Value Date    EGFR 101 07/17/2024    EGFR 97 07/16/2024    EGFR 94 07/01/2024    CREATININE 0.71 07/17/2024    CREATININE 0.78 07/16/2024    CREATININE 0.83 07/01/2024   Creatinine currently at baseline    Thyroid cancer ([facility])  Assessment & Plan  Continue thyroid hormone replacement.  Outpatient follow-up    PAD (peripheral artery disease)  (HCC)  Assessment & Plan  Patient was seen by vascular 4/2024  Patient with peripheral arterial disease, with previous right popliteal artery stent, and subsequent TMA  On aspirin/statin/prophylaxis prior to admission   Currently on aspirin, and statin    Hyperlipidemia  Assessment & Plan  Continue statin therapy.    Gastroesophageal reflux disease without esophagitis  Assessment & Plan  Continue proton pump inhibitor    Hypertension, essential  Assessment & Plan  Patient records reviewed: Patient was previously on irbesartan-hydrochlorothiazide 150/12.5 mg daily, and verapamil 260 mg daily  Patient's verapamil has been held for orthostatic hypotension  Reviewed patient's patient cardiology office note from 4/27/2023: Patient does not have any other indication for verapamil and will not restart this medication due to potential side effects  Blood pressure noted to be increasing, will restart an ARB: cozaar 25mg daily  Monitor blood pressure and titrate as needed               VTE Pharmacologic Prophylaxis: VTE Score: 10 :  recommend DVT prophy with lovenox or heparin sq:    currently on ASA 81mg bid:  notified on call Ortho team of recommendatino    Mobility:   Basic Mobility Inpatient Raw Score: 17  JH-HLM Goal: 5: Stand one or more mins  JH-HLM Achieved: 6: Walk 10 steps or more  JH-HLM Goal achieved. Continue to encourage appropriate mobility.    Patient Centered Rounds: I performed bedside rounds with nursing staff today.   Discussions with Specialists or Other Care Team Provider:   Messaged on jong Castro pa-c regarding dvt prophy dose      Current Length of Stay: 4 day(s)  Current Patient Status: Inpatient   Certification Statement: The patient will continue to require additional inpatient hospital stay due to abx, dc placement  Discharge Plan: Anticipate discharge in 24-48 hrs to rehab facility.    Code Status: Level 1 - Full Code    Subjective:   Patient notes he had pain in his legs earlier,  improved with stronger dose of Dilaudid.  He notes usually the Dilaudid and Tylenol combination control his pain.  He denies any pain anywhere else.  He denies any abdominal pain.  Denies any nausea or vomiting.  Denies any diarrhea.  Notes he is passed his bowels yesterday and today.  Denies constipation.  Is tolerating p.o.  He denies any shortness of breath or cough.  Denies any other complaints    Objective:     Vitals:   Temp (24hrs), Av.1 °F (36.7 °C), Min:97.6 °F (36.4 °C), Max:98.5 °F (36.9 °C)    Temp:  [97.6 °F (36.4 °C)-98.5 °F (36.9 °C)] 98.3 °F (36.8 °C)  HR:  [75-92] 90  Resp:  [18] 18  BP: (120-157)/(75-90) 157/80  SpO2:  [95 %-97 %] 97 %  Body mass index is 29.82 kg/m².     Input and Output Summary (last 24 hours):     Intake/Output Summary (Last 24 hours) at 2024 1918  Last data filed at 2024 1700  Gross per 24 hour   Intake 2160 ml   Output 1475 ml   Net 685 ml       Physical Exam:   Physical Exam   General: Very pleasant male.  No acute distress.  Nontachypneic and not dyspneic  Heart: Regular rate and rhythm.  S1-S2 present.  No murmur, rub, gallop  Lungs: Clear to auscultation bilaterally.  No wheezes, crackles, rhonchi.  Good air movement  Abdomen: Soft, nontender, nondistended, normoactive bowel sounds present.  No guarding or rebound.  No peritoneal signs or mass  Extremities: Right lower extremity dressing in place  Neurologic: Awake and alert.  Fluent speech.  Interactive.  Smiling and joking.  No somnolence or lethargy    Additional Data:     Labs:  Results from last 7 days   Lab Units 24  0520 24  0610 24  0426   WBC Thousand/uL 12.40*   < > 11.21*   HEMOGLOBIN g/dL 9.4*   < > 11.1*   HEMATOCRIT % 28.7*   < > 33.1*   PLATELETS Thousands/uL 172   < > 185   SEGS PCT %  --   --  66   LYMPHO PCT %  --   --  17   MONO PCT %  --   --  15*   EOS PCT %  --   --  2    < > = values in this interval not displayed.     Results from last 7 days   Lab Units  07/17/24  0610   SODIUM mmol/L 136   POTASSIUM mmol/L 3.6   CHLORIDE mmol/L 107   CO2 mmol/L 24   BUN mg/dL 22   CREATININE mg/dL 0.71   ANION GAP mmol/L 5   CALCIUM mg/dL 8.2*   GLUCOSE RANDOM mg/dL 85         Results from last 7 days   Lab Units 07/19/24  1544 07/19/24  1201 07/19/24  0758 07/18/24  2101 07/18/24  1557 07/18/24  1112 07/18/24  0634 07/17/24  2129 07/17/24  1542 07/17/24  1049 07/17/24  0700 07/16/24  2113   POC GLUCOSE mg/dl 187* 215* 106 175* 191* 189* 104 190* 157* 145* 87 174*               Lines/Drains:  Invasive Devices       Peripherally Inserted Central Catheter Line  Duration             PICC Line 07/19/24 Right Basilic <1 day                    Central Line:  Goal for removal: PICC line for abx             Imaging:  reviewed xray report of R knee from 7/15  Reviewed op cutlures ; no growth thus far      Recent Cultures (last 7 days):   Results from last 7 days   Lab Units 07/15/24  1152 07/15/24  1150 07/15/24  1149 07/15/24  1146 07/15/24  1141   GRAM STAIN RESULT  Rare Polys  No bacteria seen No Polys or Bacteria seen Rare Polys  No bacteria seen No Polys or Bacteria seen No Polys or Bacteria seen       Last 24 Hours Medication List:   Current Facility-Administered Medications   Medication Dose Route Frequency Provider Last Rate    acetaminophen  650 mg Oral Q4H PRN Dionne Kuhn PA-C      acetaminophen  975 mg Oral Q8H Dionne Kuhn PA-C      ascorbic acid  500 mg Oral BID Dionne Kuhn PA-C      aspirin  81 mg Oral BID Dionne Kuhn PA-C      atorvastatin  40 mg Oral Daily With Dinner Dionne Kuhn PA-C      DAPTOmycin  6 mg/kg (Adjusted) Intravenous Q24H Av Juarez  mg (07/19/24 1635)    docusate sodium  100 mg Oral BID Dionne Kuhn PA-C      escitalopram  5 mg Oral Daily Dionne Kuhn PA-C      folic acid  1 mg Oral Daily Dionne Kuhn PA-C      gabapentin  300 mg Oral HS Dionne Nichol Bam, PA-C      HYDROmorphone  0.5 mg Intravenous Q2H PRN Jorge  Marko Hendrix,       HYDROmorphone  2 mg Oral Q4H PRN Clay Zavala MD      HYDROmorphone  4 mg Oral Q4H PRN Clay Zavala MD      insulin glargine  25 Units Subcutaneous Q12H TESS Clay Zavala MD      insulin lispro  1-6 Units Subcutaneous TID AC Dionne Kuhn PA-C      levothyroxine  175 mcg Oral Early Morning Dionne Kuhn PA-C      [START ON 7/20/2024] losartan  25 mg Oral Daily Velia Molina MD      multivitamin-minerals  1 tablet Oral Daily Dionne Kuhn PA-C      ondansetron  4 mg Intravenous Q6H PRN Dionne Kuhn PA-C      pantoprazole  40 mg Oral Every Other Day Dionne Kuhn PA-C      senna  1 tablet Oral Daily Dionne Kuhn PA-C      thiamine  100 mg Oral Daily Velia Molina MD      zolpidem  10 mg Oral HS PRN Dionne Kuhn PA-C          Today, Patient Was Seen By: Velia Molina MD    **Please Note: This note may have been constructed using a voice recognition system.**

## 2024-07-19 NOTE — PROGRESS NOTES
Progress Note - Orthopedics   Aaron Richards 59 y.o. male MRN: [id number]  Unit/Bed#: E2 -01    Assessment:  59 y.o.male POD 4 s/p R antibiotic cement TKA with Dr. 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      Plan:  WBAT RLE   Pain control  DVT ppx: aspirin 81mg BID x 4 weeks  PT/OT - ROM as tolerated, pillow/blankets under achilles not behind knee while in bed  Interoperative cultures continue to be negative, ID has requested to hold the cultures for 14 days total.  PICC consent was obtained and placed on the patient discharged today the patient will most likely need PICC line placement prior to discharge as IDs plan is for 6 weeks antibiotic treatment.  Abx per ID -currently Dapto  Dispo: Ortho will follow. Plan for rehab for IV abx and PT      Subjective:    59 y.o.male seen and examined at bedside.  States that he slept well overnight and doing well this morning.  He denies any other acute complaints. No issues or concerns overnight.     Labs:  0   Lab Value Date/Time    HCT 28.7 (L) 07/18/2024 0520    HCT 28.9 (L) 07/17/2024 0610    HCT 33.1 (L) 07/16/2024 0426    HGB 9.4 (L) 07/18/2024 0520    HGB 9.7 (L) 07/17/2024 0610    HGB 11.1 (L) 07/16/2024 0426    INR 1.08 07/01/2024 1151    WBC 12.40 (H) 07/18/2024 0520    WBC 11.62 (H) 07/17/2024 0610    WBC 11.21 (H) 07/16/2024 0426    ESR 24 (H) 02/21/2020 1101       Meds:    Current Facility-Administered Medications:     acetaminophen (TYLENOL) tablet 650 mg, 650 mg, Oral, Q4H PRN, Dionne Kuhn PA-C    acetaminophen (TYLENOL) tablet 975 mg, 975 mg, Oral, Q8H, Dionne Kuhn PA-C, 975 mg at 07/18/24 2200    ascorbic acid (VITAMIN C) tablet 500 mg, 500 mg, Oral, BID, Dionne Kuhn PA-C, 500 mg at 07/18/24 1727    aspirin (ECOTRIN LOW STRENGTH) EC tablet 81 mg, 81 mg, Oral, BID, Dionne Kuhn PA-C, 81 mg at 07/18/24 1727    atorvastatin (LIPITOR) tablet 40 mg, 40 mg, Oral, Daily With Dinner, Dionne Gandara  GABRIEL Kuhn, 40 mg at 07/18/24 1530    DAPTOmycin (CUBICIN) 575 mg in sodium chloride 0.9 % 50 mL IVPB, 6 mg/kg (Adjusted), Intravenous, Q24H, Av Juarez MD, Last Rate: 100 mL/hr at 07/18/24 1748, 575 mg at 07/18/24 1748    docusate sodium (COLACE) capsule 100 mg, 100 mg, Oral, BID, Dionne Kuhn PA-C, 100 mg at 07/17/24 1204    escitalopram (LEXAPRO) tablet 5 mg, 5 mg, Oral, Daily, Dionne Kuhn PA-C, 5 mg at 07/18/24 0832    folic acid (FOLVITE) tablet 1 mg, 1 mg, Oral, Daily, Dionne Kuhn PA-C, 1 mg at 07/18/24 0831    gabapentin (NEURONTIN) capsule 300 mg, 300 mg, Oral, HS, Dionne Kuhn PA-C    HYDROmorphone (DILAUDID) injection 0.5 mg, 0.5 mg, Intravenous, Q2H PRN, Jorge Hendrix DO    HYDROmorphone (DILAUDID) tablet 2 mg, 2 mg, Oral, Q4H PRN, Clay Zavala MD, 2 mg at 07/18/24 1524    HYDROmorphone (DILAUDID) tablet 4 mg, 4 mg, Oral, Q4H PRN, Clay Zavala MD, 4 mg at 07/18/24 2020    insulin glargine (LANTUS) subcutaneous injection 25 Units 0.25 mL, 25 Units, Subcutaneous, Q12H TESS, Clay Zavala MD, 25 Units at 07/18/24 2146    insulin lispro (HumALOG/ADMELOG) 100 units/mL subcutaneous injection 1-6 Units, 1-6 Units, Subcutaneous, TID AC, 2 Units at 07/18/24 1605 **AND** Fingerstick Glucose (POCT), , , TID AC, Dionne Kuhn PA-C    levothyroxine tablet 175 mcg, 175 mcg, Oral, Early Morning, Dionne Kuhn PA-C, 175 mcg at 07/19/24 0546    multivitamin-minerals (CENTRUM) tablet 1 tablet, 1 tablet, Oral, Daily, Dionne Kuhn PA-C, 1 tablet at 07/18/24 0830    ondansetron (ZOFRAN) injection 4 mg, 4 mg, Intravenous, Q6H PRN, Dionne Kuhn PA-C, 4 mg at 07/17/24 0420    pantoprazole (PROTONIX) EC tablet 40 mg, 40 mg, Oral, Every Other Day, Dionne Kuhn PA-C, 40 mg at 07/17/24 0823    senna (SENOKOT) tablet 8.6 mg, 1 tablet, Oral, Daily, Dionne Kuhn PA-C, 8.6 mg at 07/18/24 1145    zolpidem (AMBIEN) tablet 10 mg, 10 mg, Oral, HS PRN, Dionne Kuhn PA-C    Blood  Culture:   Lab Results   Component Value Date    BLOODCX No Growth After 5 Days. 10/10/2020    BLOODCX No Growth After 5 Days. 10/10/2020       Wound Culture:   Lab Results   Component Value Date    WOUNDCULT 2+ Growth of Morganella morganii (A) 08/30/2020    WOUNDCULT (A) 08/30/2020     2+ Growth of Methicillin Resistant Staphylococcus aureus    WOUNDCULT 1+ Growth of Enterobacter cloacae complex (A) 08/30/2020    WOUNDCULT 1+ Growth of Enterococcus faecalis (A) 08/30/2020       Ins and Outs:  I/O last 24 hours:  In: 480 [P.O.:480]  Out: 1075 [Urine:1075]        Physical:  Vitals:    07/18/24 2203   BP: 146/90   Pulse: 92   Resp: 18   Temp: 98.5 °F (36.9 °C)   SpO2: 96%         Musculoskeletal: right Lower Extremity  Skin -  No erythema or ecchymosis.  No active drainage when dressings were removed this morning and incision is well-approximated.  Patient was noted to have some drainage after doing physical therapy later this afternoon.  Dressing was changed and currently is dry.  Sensation intact   Motor intact to, +ankle dorsi/plantar flexion  Foot warm and well perfused - absence of all toes.         Sesar Pruett PA-C

## 2024-07-19 NOTE — PHYSICAL THERAPY NOTE
PHYSICAL THERAPY NOTE          Patient Name: Aaron Richards  Today's Date: 7/19/2024 07/19/24 6462   Note Type   Note Type Treatment   Pain Assessment   Pain Assessment Tool 0-10   Pain Score 7   Pain Location/Orientation Orientation: Right;Location: Knee  (AND ANTERIOR THIGH)   Hospital Pain Intervention(s) Repositioned;Ambulation/increased activity;Emotional support;Rest   Restrictions/Precautions   RLE Weight Bearing Per Order WBAT   Other Precautions WBS;Pain;Fall Risk   General   Chart Reviewed Yes   Family/Caregiver Present No   Cognition   Overall Cognitive Status WFL   Subjective   Subjective MY DAY DID NOT START OUT THAT GREAT.   Bed Mobility   Supine to Sit 6  Modified independent   Additional items HOB elevated;Bedrails;Increased time required   Transfers   Sit to Stand 4  Minimal assistance   Additional items Assist x 1;Armrests;Increased time required;Verbal cues   Stand to Sit 4  Minimal assistance   Additional items Assist x 1;Armrests;Increased time required   Additional Comments INCREASED TIME TO PERFORM AND COMPLETE FROM RECLINER.   Ambulation/Elevation   Gait pattern Forward Flexion;Antalgic;Decreased R stance   Gait Assistance 4  Minimal assist   Additional items Assist x 1;Verbal cues   Assistive Device Rolling walker   Distance 10' X1, 25' X1 WITH CHAIR FOLLOW   Ambulation/Elevation Additional Comments (+) DIZZINESS WITH DROP IN BP  FROM 155/ 79 SITTING / 64 STANDING.   Endurance Deficit   Endurance Deficit Description (+) DIZZINESS IN STANDING  WITH DROP IN BP  FROM 155/79 SITTNG /64 STANDING  BP POST AMBULATION 113/91   Activity Tolerance   Activity Tolerance Treatment limited secondary to medical complications (Comment);Patient limited by fatigue  (HYPOTENSION  (+0 DIZZINESS)   Medical Staff Made Aware VALERIA, otelaine   Exercises   TKR Sitting;Supine;AAROM;AROM;Right  (X 12 REPS.  AP, LAQ, HIP  FLEXION, SAQ,  A-AASLR, QS,)   Equipment Use   Comments BLOODY DRAINAGE NOTED FROM INCISION WITH WEIGHBEARING ACTIVITY, REINFORECED ACE WRAP DRESSING, PT'S rn MADE AWARE.   Assessment   Prognosis Good   Problem List Decreased strength;Decreased range of motion;Decreased endurance;Impaired balance;Decreased mobility;Decreased skin integrity;Orthopedic restrictions;Pain   Goals   Patient Goals To be able to walk and get up and down my steps.   STG Expiration Date 07/29/24   PT Treatment Day 7   Plan   Treatment/Interventions Functional transfer training;LE strengthening/ROM;Therapeutic exercise;Endurance training;Patient/family training;Equipment eval/education;Bed mobility;Gait training;Spoke to nursing;OT   Progress Slow progress, medical status limitations   PT Frequency 5-7x/wk;Twice a day   Discharge Recommendation   Rehab Resource Intensity Level, PT I (Maximum Resource Intensity)   Additional Comments The patient's AM-PAC Basic Mobility Inpatient Short Form Raw Score is 17. A raw score greater than 16 suggests the patient may benefit from discharge to home. Please also refer to the recommendation of the Physical Therapist for safe discharge planning.   AM-PAC Basic Mobility Inpatient   Turning in Flat Bed Without Bedrails 4   Lying on Back to Sitting on Edge of Flat Bed Without Bedrails 4   Moving Bed to Chair 3   Standing Up From Chair Using Arms 3   Walk in Room 2   Climb 3-5 Stairs With Railing 1   Basic Mobility Inpatient Raw Score 17   Basic Mobility Standardized Score 39.67   [facility] Highest Level Of Mobility   -HLM Goal 5: Stand one or more mins   -HLM Achieved 6: Walk 10 steps or more   Education   Education Provided Mobility training;Home exercise program;Assistive device   Patient Reinforcement needed   End of Consult   Patient Position at End of Consult Bedside chair;All needs within reach   End of Consult Comments PT DELINED ICE TO R KNEE AND SCD TO L LE.     Savita Prajapati, PTA

## 2024-07-20 LAB
BACTERIA SPEC ANAEROBE CULT: NO GROWTH
BACTERIA TISS AEROBE CULT: NO GROWTH
GLUCOSE SERPL-MCNC: 168 MG/DL (ref 65–140)
GLUCOSE SERPL-MCNC: 179 MG/DL (ref 65–140)
GLUCOSE SERPL-MCNC: 190 MG/DL (ref 65–140)
GLUCOSE SERPL-MCNC: 210 MG/DL (ref 65–140)
GRAM STN SPEC: NORMAL

## 2024-07-20 PROCEDURE — 99232 SBSQ HOSP IP/OBS MODERATE 35: CPT | Performed by: INTERNAL MEDICINE

## 2024-07-20 PROCEDURE — 99024 POSTOP FOLLOW-UP VISIT: CPT | Performed by: ORTHOPAEDIC SURGERY

## 2024-07-20 PROCEDURE — 82948 REAGENT STRIP/BLOOD GLUCOSE: CPT

## 2024-07-20 RX ADMIN — INSULIN LISPRO 1 UNITS: 100 INJECTION, SOLUTION INTRAVENOUS; SUBCUTANEOUS at 17:39

## 2024-07-20 RX ADMIN — DAPTOMYCIN 575 MG: 500 INJECTION, POWDER, LYOPHILIZED, FOR SOLUTION INTRAVENOUS at 17:39

## 2024-07-20 RX ADMIN — INSULIN LISPRO 1 UNITS: 100 INJECTION, SOLUTION INTRAVENOUS; SUBCUTANEOUS at 08:56

## 2024-07-20 RX ADMIN — ACETAMINOPHEN 650 MG: 325 TABLET, FILM COATED ORAL at 13:22

## 2024-07-20 RX ADMIN — HYDROMORPHONE HYDROCHLORIDE 2 MG: 2 TABLET ORAL at 21:20

## 2024-07-20 RX ADMIN — ACETAMINOPHEN 975 MG: 325 TABLET, FILM COATED ORAL at 05:50

## 2024-07-20 RX ADMIN — INSULIN GLARGINE 25 UNITS: 100 INJECTION, SOLUTION SUBCUTANEOUS at 08:55

## 2024-07-20 RX ADMIN — THIAMINE HCL TAB 100 MG 100 MG: 100 TAB at 08:55

## 2024-07-20 RX ADMIN — ESCITALOPRAM 5 MG: 5 TABLET, FILM COATED ORAL at 08:56

## 2024-07-20 RX ADMIN — OXYCODONE HYDROCHLORIDE AND ACETAMINOPHEN 500 MG: 500 TABLET ORAL at 17:38

## 2024-07-20 RX ADMIN — SENNOSIDES 8.6 MG: 8.6 TABLET, FILM COATED ORAL at 08:55

## 2024-07-20 RX ADMIN — Medication 1 TABLET: at 08:55

## 2024-07-20 RX ADMIN — ASPIRIN 81 MG: 81 TABLET, COATED ORAL at 08:55

## 2024-07-20 RX ADMIN — ASPIRIN 81 MG: 81 TABLET, COATED ORAL at 17:38

## 2024-07-20 RX ADMIN — LOSARTAN POTASSIUM 25 MG: 25 TABLET, FILM COATED ORAL at 08:55

## 2024-07-20 RX ADMIN — LEVOTHYROXINE SODIUM 175 MCG: 125 TABLET ORAL at 05:49

## 2024-07-20 RX ADMIN — INSULIN LISPRO 2 UNITS: 100 INJECTION, SOLUTION INTRAVENOUS; SUBCUTANEOUS at 11:46

## 2024-07-20 RX ADMIN — OXYCODONE HYDROCHLORIDE AND ACETAMINOPHEN 500 MG: 500 TABLET ORAL at 08:55

## 2024-07-20 RX ADMIN — ACETAMINOPHEN 650 MG: 325 TABLET, FILM COATED ORAL at 21:20

## 2024-07-20 RX ADMIN — INSULIN GLARGINE 25 UNITS: 100 INJECTION, SOLUTION SUBCUTANEOUS at 21:19

## 2024-07-20 RX ADMIN — ATORVASTATIN CALCIUM 40 MG: 40 TABLET, FILM COATED ORAL at 17:38

## 2024-07-20 RX ADMIN — FOLIC ACID 1 MG: 1 TABLET ORAL at 08:55

## 2024-07-20 NOTE — ASSESSMENT & PLAN NOTE
Patient was started on Cozaar 25 mg daily.  This can be uptitrated as needed.  He was previously on irbesartan hydrochlorothiazide 150-12.5 mg daily and verapamil 260 mg daily  Verapamil has been held/stopped due to orthostatic hypotension

## 2024-07-20 NOTE — ASSESSMENT & PLAN NOTE
History of thyroid cancer status post total thyroidectomy on 2/3/2021 by ENT Dr. Garvey   With acquired hypothyroidism  Continue levothyroxine 175 mcg daily

## 2024-07-20 NOTE — ASSESSMENT & PLAN NOTE
Patient was admitted to the orthopedic surgery service with right knee septic arthritis  Patient had previous tibial fracture surgery, and subsequent injury after a fall  Had hardware failure requiring hardware removal  Was noted to have a sinus tract extending into the joint space  Patient underwent I&D and spacer placement 7/15: Intraoperative cultures remain negative.  PICC line placed 7/19/24  Continue daptomycin until 8/26/2024, 6 weeks of treatment  Outpatient ID follow-up  DC PICC line once antibiotic course is completed

## 2024-07-20 NOTE — PLAN OF CARE
Problem: Prexisting or High Potential for Compromised Skin Integrity  Goal: Skin integrity is maintained or improved  Description: INTERVENTIONS:  - Identify patients at risk for skin breakdown  - Assess and monitor skin integrity  - Assess and monitor nutrition and hydration status  - Monitor labs   - Assess for incontinence   - Turn and reposition patient  - Assist with mobility/ambulation  - Relieve pressure over bony prominences  - Avoid friction and shearing  - Provide appropriate hygiene as needed including keeping skin clean and dry  - Evaluate need for skin moisturizer/barrier cream  - Collaborate with interdisciplinary team   - Patient/family teaching  - Consider wound care consult   Outcome: Progressing     Problem: PAIN - ADULT  Goal: Verbalizes/displays adequate comfort level or baseline comfort level  Description: Interventions:  - Encourage patient to monitor pain and request assistance  - Assess pain using appropriate pain scale  - Administer analgesics based on type and severity of pain and evaluate response  - Implement non-pharmacological measures as appropriate and evaluate response  - Consider cultural and social influences on pain and pain management  - Notify physician/advanced practitioner if interventions unsuccessful or patient reports new pain  Outcome: Progressing     Problem: SAFETY ADULT  Goal: Patient will remain free of falls  Description: INTERVENTIONS:  - Educate patient/family on patient safety including physical limitations  - Instruct patient to call for assistance with activity   - Consult OT/PT to assist with strengthening/mobility   - Keep Call bell within reach  - Keep bed low and locked with side rails adjusted as appropriate  - Keep care items and personal belongings within reach  - Initiate and maintain comfort rounds  - Make Fall Risk Sign visible to staff  - Offer Toileting every 2 Hours, in advance of need  - Initiate/Maintain bed alarm  - Obtain necessary fall risk  management equipment: call bell, gripper socks, walker, bed/chair alarm, urinal  - Apply yellow socks and bracelet for high fall risk patients  - Consider moving patient to room near nurses station  Outcome: Progressing     Problem: DISCHARGE PLANNING  Goal: Discharge to home or other facility with appropriate resources  Description: INTERVENTIONS:  - Identify barriers to discharge w/patient and caregiver  - Arrange for needed discharge resources and transportation as appropriate  - Identify discharge learning needs (meds, wound care, etc.)  - Refer to Case Management Department for coordinating discharge planning if the patient needs post-hospital services based on physician/advanced practitioner order or complex needs related to functional status, cognitive ability, or social support system  Outcome: Progressing     Problem: GASTROINTESTINAL - ADULT  Goal: Maintains or returns to baseline bowel function  Description: INTERVENTIONS:  - Assess bowel function  - Encourage oral fluids to ensure adequate hydration  - Administer IV fluids if ordered to ensure adequate hydration  - Administer ordered medications as needed  - Encourage mobilization and activity  - Consider nutritional services referral to assist patient with adequate nutrition and appropriate food choices  Outcome: Progressing     Problem: GENITOURINARY - ADULT  Goal: Maintains or returns to baseline urinary function  Description: INTERVENTIONS:  - Assess urinary function  - Encourage oral fluids to ensure adequate hydration if ordered  - Administer IV fluids as ordered to ensure adequate hydration  - Administer ordered medications as needed  - Offer frequent toileting  - Follow urinary retention protocol if ordered  Outcome: Progressing

## 2024-07-20 NOTE — ASSESSMENT & PLAN NOTE
Patient was seen by vascular 4/2024  Patient with peripheral arterial disease, with previous right popliteal artery stent, and subsequent TMA  Currently on aspirin 81 mg twice daily (Ortho DVT prophylaxis for 4 weeks)  Continue Lipitor 40 mg daily

## 2024-07-20 NOTE — PROGRESS NOTES
Progress Note - Infectious Disease   Aaron Richards 59 y.o. male MRN: [id number]  Unit/Bed#: E2 -01 Encounter: [id number]      Impression/Plan:  1.  Right knee septic arthritis.  In the setting of a previous traumatic injury with hardware failure requiring hardware removal and now has the development of a sinus tract extending into the joint space with operative findings consistent with a septic arthritis.  Although previous cultures have been negative they were obtained while he has been on antibiotics.  He is now status post I&D, and placement of a spacer on 7/15/2024. CPK of 256.  Operative cultures negative thus far.  -Continue daptomycin 6 mg/kg IV every 24 hours through 8/26/2024 to complete 6 weeks total treatment.  -Check CPK weekly while on daptomycin  -Check CBC with differential and BMP to make sure no developing toxicity  -Follow-up operative cultures and adjust antibiotics needed  -Holding the cultures for 2 weeks for fastidious organisms  -Close orthopedics follow-up  -Remove PICC line after last dose of intravenous antibiotics     2.  Poor dentition.  Patient with new hardware in his knee.  -Recommend OMFS evaluation for possible extractions     3.  Diabetes mellitus.  Type II.  Improved control with hemoglobin A1c of 6.2.     4.  Chronic kidney disease.  Stage III.  Renal function stable.  -Check BMP to assess stability of the renal function  -Volume management  -Dose adjust the antibiotics needed    Discussed with the primary service the plan to continue the daptomycin awaiting placement in rehab and they agree with the plan     Antibiotics:  Daptomycin 6  Postop day 5    Subjective:  Patient has no fever, chills, sweats; no nausea, vomiting, diarrhea; no cough, shortness of breath; less right knee pain. No new symptoms.    Objective:  Vitals:  Temp:  [97.6 °F (36.4 °C)-98.7 °F (37.1 °C)] 97.8 °F (36.6 °C)  HR:  [75-93] 85  Resp:  [17-18] 17  BP: (120-157)/(75-88) 146/76  SpO2:  [94 %-97 %]  94 %  Temp (24hrs), Av.1 °F (36.7 °C), Min:97.6 °F (36.4 °C), Max:98.7 °F (37.1 °C)  Current: Temperature: 97.8 °F (36.6 °C)    Physical Exam:   General Appearance:  Alert, interactive, nontoxic, no acute distress.   Throat: Oropharynx moist without lesions.    Lungs:   Clear to auscultation bilaterally; no wheezes, rhonchi or rales; respirations unlabored   Heart:  RRR; no murmur, rub or gallop   Abdomen:   Soft, non-tender, non-distended, positive bowel sounds.     Extremities: Right knee incision without erythema or purulence.   Skin: No new rashes or lesions. No draining wounds noted.       Labs, Imaging, & Other studies:   All pertinent labs and imaging studies were personally reviewed  Results from last 7 days   Lab Units 24  0520 24  0610 24  0426   WBC Thousand/uL 12.40* 11.62* 11.21*   HEMOGLOBIN g/dL 9.4* 9.7* 11.1*   PLATELETS Thousands/uL 172 156 185     Results from last 7 days   Lab Units 24  0610 24  0426   SODIUM mmol/L 136 136   POTASSIUM mmol/L 3.6 3.7   CHLORIDE mmol/L 107 105   CO2 mmol/L 24 24   BUN mg/dL 22 21   CREATININE mg/dL 0.71 0.78   EGFR ml/min/1.73sq m 101 97   CALCIUM mg/dL 8.2* 8.2*     Results from last 7 days   Lab Units 07/15/24  1152 07/15/24  1150 07/15/24  1149 07/15/24  1146 07/15/24  1141   GRAM STAIN RESULT  Rare Polys  No bacteria seen No Polys or Bacteria seen Rare Polys  No bacteria seen No Polys or Bacteria seen No Polys or Bacteria seen

## 2024-07-20 NOTE — PROGRESS NOTES
Progress Note - Orthopedics   Aaron Richards 59 y.o. male MRN: [id number]  Unit/Bed#: E2 -01    Assessment:  59 y.o.male POD 5 s/p R antibiotic cement TKA with Dr. 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      Plan:  WBAT RLE   Pain control  DVT ppx: aspirin 81mg BID x 4 weeks  PT/OT - ROM as tolerated, pillow/blankets under achilles not behind knee while in bed  Interoperative cultures continue to be negative, ID has requested to hold the cultures for 14 days total.  PICC line placement prior to discharge as IDs plan is for 6 weeks antibiotic treatment.  Abx per ID -currently Dapto  Dispo: Ortho will follow. Plan for rehab for IV abx and PT      Subjective:    Pt reports moderate right knee pain.  He ambulated in the hallway this morning and did fairly well.  He denies numbness in the RLE.     Labs:  0   Lab Value Date/Time    HCT 28.7 (L) 07/18/2024 0520    HCT 28.9 (L) 07/17/2024 0610    HCT 33.1 (L) 07/16/2024 0426    HGB 9.4 (L) 07/18/2024 0520    HGB 9.7 (L) 07/17/2024 0610    HGB 11.1 (L) 07/16/2024 0426    INR 1.08 07/01/2024 1151    WBC 12.40 (H) 07/18/2024 0520    WBC 11.62 (H) 07/17/2024 0610    WBC 11.21 (H) 07/16/2024 0426    ESR 24 (H) 02/21/2020 1101       Meds:    Current Facility-Administered Medications:     acetaminophen (TYLENOL) tablet 650 mg, 650 mg, Oral, Q4H PRN, Dionne Kuhn PA-C    acetaminophen (TYLENOL) tablet 975 mg, 975 mg, Oral, Q8H, Dionne Kuhn PA-C, 975 mg at 07/20/24 0550    ascorbic acid (VITAMIN C) tablet 500 mg, 500 mg, Oral, BID, Dionne Kuhn PA-C, 500 mg at 07/20/24 0855    aspirin (ECOTRIN LOW STRENGTH) EC tablet 81 mg, 81 mg, Oral, BID, Dionne Kuhn PA-C, 81 mg at 07/20/24 0855    atorvastatin (LIPITOR) tablet 40 mg, 40 mg, Oral, Daily With Dinner, Dionne Kuhn PA-C, 40 mg at 07/19/24 1609    DAPTOmycin (CUBICIN) 575 mg in sodium chloride 0.9 % 50 mL IVPB, 6 mg/kg (Adjusted), Intravenous, Q24H, Peter  MD Larry, Stopped at 07/19/24 1705    docusate sodium (COLACE) capsule 100 mg, 100 mg, Oral, BID, Dionne Kuhn PA-C, 100 mg at 07/17/24 1204    escitalopram (LEXAPRO) tablet 5 mg, 5 mg, Oral, Daily, Dionne Kuhn PA-C, 5 mg at 07/20/24 0856    folic acid (FOLVITE) tablet 1 mg, 1 mg, Oral, Daily, Dionne Kuhn PA-C, 1 mg at 07/20/24 0855    gabapentin (NEURONTIN) capsule 300 mg, 300 mg, Oral, HS, Dionne Kuhn PA-C    HYDROmorphone (DILAUDID) injection 0.5 mg, 0.5 mg, Intravenous, Q2H PRN, Jorge Hendrix DO    HYDROmorphone (DILAUDID) tablet 2 mg, 2 mg, Oral, Q4H PRN, Clay Zavala MD, 2 mg at 07/19/24 2007    HYDROmorphone (DILAUDID) tablet 4 mg, 4 mg, Oral, Q4H PRN, Clay Zavala MD, 4 mg at 07/18/24 2020    insulin glargine (LANTUS) subcutaneous injection 25 Units 0.25 mL, 25 Units, Subcutaneous, Q12H TESS, Clay Zavala MD, 25 Units at 07/20/24 0855    insulin lispro (HumALOG/ADMELOG) 100 units/mL subcutaneous injection 1-6 Units, 1-6 Units, Subcutaneous, TID AC, 1 Units at 07/20/24 0856 **AND** Fingerstick Glucose (POCT), , , TID AC, Dionne Kuhn PA-C    levothyroxine tablet 175 mcg, 175 mcg, Oral, Early Morning, Dionne Kuhn PA-C, 175 mcg at 07/20/24 0549    losartan (COZAAR) tablet 25 mg, 25 mg, Oral, Daily, Velia Molina MD, 25 mg at 07/20/24 0855    multivitamin-minerals (CENTRUM) tablet 1 tablet, 1 tablet, Oral, Daily, Dionne Kuhn PA-C, 1 tablet at 07/20/24 0855    ondansetron (ZOFRAN) injection 4 mg, 4 mg, Intravenous, Q6H PRN, Dionne Kuhn PA-C, 4 mg at 07/19/24 0832    pantoprazole (PROTONIX) EC tablet 40 mg, 40 mg, Oral, Every Other Day, Dionne Kuhn PA-C, 40 mg at 07/19/24 0753    senna (SENOKOT) tablet 8.6 mg, 1 tablet, Oral, Daily, Dionne Kuhn PA-C, 8.6 mg at 07/20/24 0855    thiamine tablet 100 mg, 100 mg, Oral, Daily, Velia Molina MD, 100 mg at 07/20/24 0855    zolpidem (AMBIEN) tablet 10 mg, 10 mg, Oral, HS PRN, Dionne Kuhn PA-C    Blood  Culture:   Lab Results   Component Value Date    BLOODCX No Growth After 5 Days. 10/10/2020    BLOODCX No Growth After 5 Days. 10/10/2020       Wound Culture:   Lab Results   Component Value Date    WOUNDCULT 2+ Growth of Morganella morganii (A) 08/30/2020    WOUNDCULT (A) 08/30/2020     2+ Growth of Methicillin Resistant Staphylococcus aureus    WOUNDCULT 1+ Growth of Enterobacter cloacae complex (A) 08/30/2020    WOUNDCULT 1+ Growth of Enterococcus faecalis (A) 08/30/2020       Ins and Outs:  I/O last 24 hours:  In: 2330 [P.O.:2280; IV Piggyback:50]  Out: 1000 [Urine:1000]        Physical:  Vitals:    07/20/24 0725   BP: 146/76   Pulse: 85   Resp: 17   Temp: 97.8 °F (36.6 °C)   SpO2: 94%         Musculoskeletal: right Lower Extremity  Dressing with mild serosanguinous drainage  Incision approximated without erythema  Knee ROM 5-80 degrees  Sensation intact   Motor intact to, +ankle dorsi/plantar flexion  Foot warm and well perfused         Mehul Torre MD

## 2024-07-20 NOTE — ASSESSMENT & PLAN NOTE
Lab Results   Component Value Date    HGBA1C 6.2 (H) 07/01/2024       Recent Labs     07/19/24  1544 07/19/24  2124 07/20/24  0724 07/20/24  1127   POCGLU 187* 223* 168* 190*     A1c is at goal  Home regimen Lantus 45 units every 12 hours, and Apidra sliding scale insulin  On decreased dose of 25 units every 12 hours due to restricted hospital diet  Titrate as needed  Continue Accu-Cheks, and sliding scale insulin

## 2024-07-20 NOTE — PROGRESS NOTES
[facility]  Progress Note  Name: Aaron Richards I  MRN: [id number]  Unit/Bed#: E2 -01 I Date of Admission: 7/15/2024   Date of Service: 7/20/2024 I Hospital Day: 5    Assessment & Plan   * Septic arthritis of knee (HCC)  Assessment & Plan  Patient was admitted to the orthopedic surgery service with right knee septic arthritis  Patient had previous tibial fracture surgery, and subsequent injury after a fall  Had hardware failure requiring hardware removal  Was noted to have a sinus tract extending into the joint space  Patient underwent I&D and spacer placement 7/15: Intraoperative cultures remain negative.  PICC line placed 7/19/24  Continue daptomycin until 8/26/2024, 6 weeks of treatment  Outpatient ID follow-up  DC PICC line once antibiotic course is completed    Type 2 diabetes mellitus without complication, with long-term current use of insulin (Formerly [facility])  Assessment & Plan  Lab Results   Component Value Date    HGBA1C 6.2 (H) 07/01/2024       Recent Labs     07/19/24  1544 07/19/24  2124 07/20/24  0724 07/20/24  1127   POCGLU 187* 223* 168* 190*     A1c is at goal  Home regimen Lantus 45 units every 12 hours, and Apidra sliding scale insulin  On decreased dose of 25 units every 12 hours due to restricted hospital diet  Titrate as needed  Continue Accu-Cheks, and sliding scale insulin      PAD (peripheral artery disease) (Former[facility])  Assessment & Plan  Patient was seen by vascular 4/2024  Patient with peripheral arterial disease, with previous right popliteal artery stent, and subsequent TMA  Currently on aspirin 81 mg twice daily (Ortho DVT prophylaxis for 4 weeks)  Continue Lipitor 40 mg daily    Hyperlipidemia  Assessment & Plan  Continue Lipitor 40 mg daily    Hypertension, essential  Assessment & Plan  Patient was started on Cozaar 25 mg daily.  This can be uptitrated as needed.  He was previously on irbesartan hydrochlorothiazide 150-12.5 mg daily and verapamil 260 mg  daily  Verapamil has been held/stopped due to orthostatic hypotension    Thyroid cancer (HCC)  Assessment & Plan  History of thyroid cancer status post total thyroidectomy on 2/3/2021 by ENT Dr. Garvey   With acquired hypothyroidism  Continue levothyroxine 175 mcg daily    Gastroesophageal reflux disease without esophagitis  Assessment & Plan  Continue Protonix 40 mg daily           VTE Pharmacologic Prophylaxis: VTE Score: 10 High Risk (Score >/= 5) - Pharmacological DVT Prophylaxis Ordered: asa 81 mg bid per ortho. Sequential Compression Devices Ordered.    Patient Centered Rounds: Discussed with his nurse  Discussions with Specialists or Other Care Team Provider: Dr. Juarez and case managent    Current Length of Stay: 5 day(s)  Current Patient Status: Inpatient   Certification Statement: The patient will continue to require additional inpatient hospital stay due to septic arthritis  Discharge Plan: [facility] is following this patient on consult. They are medically stable for discharge when deemed appropriate by primary service.  Case management is working on rehab placement    Code Status: Level 1 - Full Code    Subjective:   Seen and examined during rounds.  He had on and off pain from the right knee  No fever or chills  He is awaiting rehab placement    Objective:     Vitals:   Temp (24hrs), Av.3 °F (36.8 °C), Min:97.8 °F (36.6 °C), Max:98.7 °F (37.1 °C)    Temp:  [97.8 °F (36.6 °C)-98.7 °F (37.1 °C)] 97.8 °F (36.6 °C)  HR:  [85-93] 85  Resp:  [17-18] 17  BP: (146-157)/(76-88) 146/76  SpO2:  [94 %-97 %] 94 %  Body mass index is 29.82 kg/m².     Input and Output Summary (last 24 hours):     Intake/Output Summary (Last 24 hours) at 2024 1313  Last data filed at 2024 0830  Gross per 24 hour   Intake 1130 ml   Output 1000 ml   Net 130 ml       Physical Exam:   Physical Exam  Vitals reviewed.   HENT:      Head: Normocephalic and atraumatic.      Nose: No congestion or rhinorrhea.   Eyes:      General: No  scleral icterus.  Cardiovascular:      Rate and Rhythm: Normal rate.   Pulmonary:      Breath sounds: No wheezing or rhonchi.   Abdominal:      Palpations: Abdomen is soft.      Tenderness: There is no abdominal tenderness. There is no guarding.   Musculoskeletal:      Comments: Right lower extremity dressing in place   Skin:     General: Skin is warm.   Neurological:      Comments: Awake alert cooperative   Psychiatric:         Mood and Affect: Mood normal.         Behavior: Behavior normal.         Additional Data:     Labs:  Results from last 7 days   Lab Units 07/18/24  0520 07/17/24  0610 07/16/24  0426   WBC Thousand/uL 12.40*   < > 11.21*   HEMOGLOBIN g/dL 9.4*   < > 11.1*   HEMATOCRIT % 28.7*   < > 33.1*   PLATELETS Thousands/uL 172   < > 185   SEGS PCT %  --   --  66   LYMPHO PCT %  --   --  17   MONO PCT %  --   --  15*   EOS PCT %  --   --  2    < > = values in this interval not displayed.     Results from last 7 days   Lab Units 07/17/24  0610   SODIUM mmol/L 136   POTASSIUM mmol/L 3.6   CHLORIDE mmol/L 107   CO2 mmol/L 24   BUN mg/dL 22   CREATININE mg/dL 0.71   ANION GAP mmol/L 5   CALCIUM mg/dL 8.2*   GLUCOSE RANDOM mg/dL 85         Results from last 7 days   Lab Units 07/20/24  1127 07/20/24  0724 07/19/24  2124 07/19/24  1544 07/19/24  1201 07/19/24  0758 07/18/24  2101 07/18/24  1557 07/18/24  1112 07/18/24  0634 07/17/24  2129 07/17/24  1542   POC GLUCOSE mg/dl 190* 168* 223* 187* 215* 106 175* 191* 189* 104 190* 157*               Lines/Drains:  Invasive Devices       Peripherally Inserted Central Catheter Line  Duration             PICC Line 07/19/24 Right Basilic 1 day                    Central Line:  Goal for removal: N/A - Discharging with PICC for IV ABX/medications             Imaging: Reviewed radiology reports from this admission including: xray(s)    Recent Cultures (last 7 days):   Results from last 7 days   Lab Units 07/15/24  1152 07/15/24  1150 07/15/24  1149 07/15/24  1146  07/15/24  1141   GRAM STAIN RESULT  Rare Polys  No bacteria seen No Polys or Bacteria seen Rare Polys  No bacteria seen No Polys or Bacteria seen No Polys or Bacteria seen       Last 24 Hours Medication List:   Current Facility-Administered Medications   Medication Dose Route Frequency Provider Last Rate    acetaminophen  650 mg Oral Q4H PRN Dionne Kuhn PA-C      acetaminophen  975 mg Oral Q8H Dionne Kuhn PA-C      ascorbic acid  500 mg Oral BID Dionne Kuhn PA-C      aspirin  81 mg Oral BID Dionne Kuhn PA-C      atorvastatin  40 mg Oral Daily With Dinner Dionne Kuhn PA-C      DAPTOmycin  6 mg/kg (Adjusted) Intravenous Q24H Av Juarez MD Stopped (07/19/24 1705)    docusate sodium  100 mg Oral BID Dionne Kuhn PA-C      escitalopram  5 mg Oral Daily Dionne Kuhn PA-C      folic acid  1 mg Oral Daily Dionne Kuhn PA-C      gabapentin  300 mg Oral HS Dionne Kuhn PA-C      HYDROmorphone  0.5 mg Intravenous Q2H PRN Jorge Hendrix DO      HYDROmorphone  2 mg Oral Q4H PRN Clay Zavala MD      HYDROmorphone  4 mg Oral Q4H PRN Clay Zavala MD      insulin glargine  25 Units Subcutaneous Q12H TESS Clay Zavala MD      insulin lispro  1-6 Units Subcutaneous TID AC Dionne Kuhn PA-C      levothyroxine  175 mcg Oral Early Morning Dionne Kuhn PA-C      losartan  25 mg Oral Daily Velia Molina MD      multivitamin-minerals  1 tablet Oral Daily Dionne Kuhn PA-C      ondansetron  4 mg Intravenous Q6H PRN Dionne Kuhn PA-C      pantoprazole  40 mg Oral Every Other Day Dionne Kuhn PA-C      senna  1 tablet Oral Daily Dionne Kuhn PA-C      thiamine  100 mg Oral Daily Velia Molina MD      zolpidem  10 mg Oral HS PRN Dionne Kuhn PA-C          Today, Patient Was Seen By: Aguilar Fletcher MD    **Please Note: This note may have been constructed using a voice recognition system.**

## 2024-07-21 ENCOUNTER — HOSPITAL ENCOUNTER (INPATIENT)
Facility: HOSPITAL | Age: 59
LOS: 11 days | Discharge: HOME HEALTH | DRG: 561 | End: 2024-08-01
Attending: FAMILY MEDICINE | Admitting: FAMILY MEDICINE
Payer: MEDICARE

## 2024-07-21 VITALS
RESPIRATION RATE: 18 BRPM | OXYGEN SATURATION: 93 % | HEART RATE: 95 BPM | DIASTOLIC BLOOD PRESSURE: 83 MMHG | SYSTOLIC BLOOD PRESSURE: 140 MMHG | HEIGHT: 76 IN | TEMPERATURE: 97.9 F | BODY MASS INDEX: 29.83 KG/M2 | WEIGHT: 245 LBS

## 2024-07-21 DIAGNOSIS — M25.561 CHRONIC PAIN OF RIGHT KNEE: ICD-10-CM

## 2024-07-21 DIAGNOSIS — G47.00 INSOMNIA, UNSPECIFIED TYPE: ICD-10-CM

## 2024-07-21 DIAGNOSIS — K21.9 GASTROESOPHAGEAL REFLUX DISEASE WITHOUT ESOPHAGITIS: ICD-10-CM

## 2024-07-21 DIAGNOSIS — Z79.4 TYPE 2 DIABETES MELLITUS WITH DIABETIC POLYNEUROPATHY, WITH LONG-TERM CURRENT USE OF INSULIN (HCC): ICD-10-CM

## 2024-07-21 DIAGNOSIS — M00.9 SEPTIC ARTHRITIS OF KNEE (HCC): Primary | ICD-10-CM

## 2024-07-21 DIAGNOSIS — E78.5 HYPERLIPIDEMIA, UNSPECIFIED HYPERLIPIDEMIA TYPE: ICD-10-CM

## 2024-07-21 DIAGNOSIS — Z78.9 IMPAIRED MOBILITY AND ACTIVITIES OF DAILY LIVING: ICD-10-CM

## 2024-07-21 DIAGNOSIS — Z45.2 PICC (PERIPHERALLY INSERTED CENTRAL CATHETER) IN PLACE: ICD-10-CM

## 2024-07-21 DIAGNOSIS — S82.131P: ICD-10-CM

## 2024-07-21 DIAGNOSIS — M17.31 POST-TRAUMATIC OSTEOARTHRITIS OF RIGHT KNEE: ICD-10-CM

## 2024-07-21 DIAGNOSIS — Z79.2 ANTIBIOTIC LONG-TERM USE: ICD-10-CM

## 2024-07-21 DIAGNOSIS — M00.9 PYOGENIC ARTHRITIS OF RIGHT KNEE JOINT, DUE TO UNSPECIFIED ORGANISM (HCC): ICD-10-CM

## 2024-07-21 DIAGNOSIS — E11.42 TYPE 2 DIABETES MELLITUS WITH DIABETIC POLYNEUROPATHY, WITH LONG-TERM CURRENT USE OF INSULIN (HCC): ICD-10-CM

## 2024-07-21 DIAGNOSIS — Z74.09 IMPAIRED MOBILITY AND ACTIVITIES OF DAILY LIVING: ICD-10-CM

## 2024-07-21 DIAGNOSIS — E03.9 ACQUIRED HYPOTHYROIDISM: ICD-10-CM

## 2024-07-21 DIAGNOSIS — G89.29 CHRONIC PAIN OF RIGHT KNEE: ICD-10-CM

## 2024-07-21 DIAGNOSIS — F32.A DEPRESSION, UNSPECIFIED DEPRESSION TYPE: ICD-10-CM

## 2024-07-21 LAB
GLUCOSE SERPL-MCNC: 111 MG/DL (ref 65–140)
GLUCOSE SERPL-MCNC: 155 MG/DL (ref 65–140)
GLUCOSE SERPL-MCNC: 219 MG/DL (ref 65–140)
GLUCOSE SERPL-MCNC: 233 MG/DL (ref 65–140)

## 2024-07-21 PROCEDURE — 99232 SBSQ HOSP IP/OBS MODERATE 35: CPT | Performed by: INTERNAL MEDICINE

## 2024-07-21 PROCEDURE — NC001 PR NO CHARGE: Performed by: PHYSICIAN ASSISTANT

## 2024-07-21 PROCEDURE — 99223 1ST HOSP IP/OBS HIGH 75: CPT

## 2024-07-21 PROCEDURE — 82948 REAGENT STRIP/BLOOD GLUCOSE: CPT

## 2024-07-21 PROCEDURE — 99024 POSTOP FOLLOW-UP VISIT: CPT | Performed by: ORTHOPAEDIC SURGERY

## 2024-07-21 PROCEDURE — NC001 PR NO CHARGE

## 2024-07-21 RX ORDER — LANOLIN ALCOHOL/MO/W.PET/CERES
100 CREAM (GRAM) TOPICAL DAILY
Status: DISCONTINUED | OUTPATIENT
Start: 2024-07-22 | End: 2024-07-21

## 2024-07-21 RX ORDER — INSULIN GLARGINE 100 [IU]/ML
25 INJECTION, SOLUTION SUBCUTANEOUS EVERY 12 HOURS SCHEDULED
Status: DISCONTINUED | OUTPATIENT
Start: 2024-07-21 | End: 2024-08-01 | Stop reason: HOSPADM

## 2024-07-21 RX ORDER — LOSARTAN POTASSIUM 25 MG/1
25 TABLET ORAL DAILY
Status: CANCELLED | OUTPATIENT
Start: 2024-07-22

## 2024-07-21 RX ORDER — INSULIN LISPRO 100 [IU]/ML
1-6 INJECTION, SOLUTION INTRAVENOUS; SUBCUTANEOUS
Start: 2024-07-21

## 2024-07-21 RX ORDER — LANOLIN ALCOHOL/MO/W.PET/CERES
100 CREAM (GRAM) TOPICAL DAILY
Status: CANCELLED | OUTPATIENT
Start: 2024-07-22

## 2024-07-21 RX ORDER — ACETAMINOPHEN 325 MG/1
650 TABLET ORAL EVERY 4 HOURS PRN
Status: DISCONTINUED | OUTPATIENT
Start: 2024-07-21 | End: 2024-07-21 | Stop reason: SDUPTHER

## 2024-07-21 RX ORDER — SENNOSIDES 8.6 MG
1 TABLET ORAL DAILY
Status: CANCELLED | OUTPATIENT
Start: 2024-07-22

## 2024-07-21 RX ORDER — ESCITALOPRAM OXALATE 5 MG/1
5 TABLET ORAL DAILY
Status: DISCONTINUED | OUTPATIENT
Start: 2024-07-22 | End: 2024-08-01 | Stop reason: HOSPADM

## 2024-07-21 RX ORDER — INSULIN GLARGINE 100 [IU]/ML
25 INJECTION, SOLUTION SUBCUTANEOUS EVERY 12 HOURS SCHEDULED
Start: 2024-07-21 | End: 2024-08-01

## 2024-07-21 RX ORDER — ATORVASTATIN CALCIUM 40 MG/1
40 TABLET, FILM COATED ORAL
Status: CANCELLED | OUTPATIENT
Start: 2024-07-21

## 2024-07-21 RX ORDER — INSULIN GLARGINE 100 [IU]/ML
25 INJECTION, SOLUTION SUBCUTANEOUS EVERY 12 HOURS SCHEDULED
Status: CANCELLED | OUTPATIENT
Start: 2024-07-21

## 2024-07-21 RX ORDER — HYDROMORPHONE HYDROCHLORIDE 2 MG/1
2 TABLET ORAL EVERY 4 HOURS PRN
Status: DISCONTINUED | OUTPATIENT
Start: 2024-07-21 | End: 2024-07-29

## 2024-07-21 RX ORDER — INSULIN LISPRO 100 [IU]/ML
1-6 INJECTION, SOLUTION INTRAVENOUS; SUBCUTANEOUS
Status: CANCELLED | OUTPATIENT
Start: 2024-07-21

## 2024-07-21 RX ORDER — HYDROMORPHONE HCL/PF 1 MG/ML
0.5 SYRINGE (ML) INJECTION EVERY 2 HOUR PRN
Status: DISCONTINUED | OUTPATIENT
Start: 2024-07-21 | End: 2024-07-21 | Stop reason: ALTCHOICE

## 2024-07-21 RX ORDER — HYDROMORPHONE HYDROCHLORIDE 2 MG/1
2 TABLET ORAL EVERY 4 HOURS PRN
Status: DISCONTINUED | OUTPATIENT
Start: 2024-07-21 | End: 2024-07-21

## 2024-07-21 RX ORDER — FOLIC ACID 1 MG/1
1 TABLET ORAL DAILY
Status: DISCONTINUED | OUTPATIENT
Start: 2024-07-22 | End: 2024-07-21

## 2024-07-21 RX ORDER — ASCORBIC ACID 500 MG
500 TABLET ORAL 2 TIMES DAILY
Status: CANCELLED | OUTPATIENT
Start: 2024-07-21

## 2024-07-21 RX ORDER — HYDROMORPHONE HYDROCHLORIDE 2 MG/1
2 TABLET ORAL EVERY 4 HOURS PRN
Status: CANCELLED | OUTPATIENT
Start: 2024-07-21

## 2024-07-21 RX ORDER — FOLIC ACID 1 MG/1
1 TABLET ORAL DAILY
Status: CANCELLED | OUTPATIENT
Start: 2024-07-22

## 2024-07-21 RX ORDER — GABAPENTIN 300 MG/1
300 CAPSULE ORAL
Status: DISCONTINUED | OUTPATIENT
Start: 2024-07-21 | End: 2024-07-21

## 2024-07-21 RX ORDER — ONDANSETRON 2 MG/ML
4 INJECTION INTRAMUSCULAR; INTRAVENOUS EVERY 6 HOURS PRN
Status: CANCELLED | OUTPATIENT
Start: 2024-07-21

## 2024-07-21 RX ORDER — DOCUSATE SODIUM 100 MG/1
100 CAPSULE, LIQUID FILLED ORAL 2 TIMES DAILY
Status: CANCELLED | OUTPATIENT
Start: 2024-07-21

## 2024-07-21 RX ORDER — ASCORBIC ACID 500 MG
500 TABLET ORAL 2 TIMES DAILY
Status: DISCONTINUED | OUTPATIENT
Start: 2024-07-21 | End: 2024-08-01 | Stop reason: HOSPADM

## 2024-07-21 RX ORDER — ESCITALOPRAM OXALATE 10 MG/1
5 TABLET ORAL DAILY
Status: CANCELLED | OUTPATIENT
Start: 2024-07-22

## 2024-07-21 RX ORDER — GABAPENTIN 300 MG/1
300 CAPSULE ORAL
Status: CANCELLED | OUTPATIENT
Start: 2024-07-21

## 2024-07-21 RX ORDER — ACETAMINOPHEN 325 MG/1
650 TABLET ORAL EVERY 4 HOURS PRN
Status: CANCELLED | OUTPATIENT
Start: 2024-07-21

## 2024-07-21 RX ORDER — ACETAMINOPHEN 325 MG/1
975 TABLET ORAL EVERY 8 HOURS
Status: DISCONTINUED | OUTPATIENT
Start: 2024-07-21 | End: 2024-07-23

## 2024-07-21 RX ORDER — INSULIN LISPRO 100 [IU]/ML
1-6 INJECTION, SOLUTION INTRAVENOUS; SUBCUTANEOUS
Status: DISCONTINUED | OUTPATIENT
Start: 2024-07-21 | End: 2024-08-01 | Stop reason: HOSPADM

## 2024-07-21 RX ORDER — SENNOSIDES 8.6 MG
1 TABLET ORAL 2 TIMES DAILY PRN
Status: DISCONTINUED | OUTPATIENT
Start: 2024-07-21 | End: 2024-08-01 | Stop reason: HOSPADM

## 2024-07-21 RX ORDER — HYDROMORPHONE HYDROCHLORIDE 2 MG/1
4 TABLET ORAL EVERY 4 HOURS PRN
Status: CANCELLED | OUTPATIENT
Start: 2024-07-21

## 2024-07-21 RX ORDER — ACETAMINOPHEN 325 MG/1
975 TABLET ORAL EVERY 8 HOURS
Status: CANCELLED | OUTPATIENT
Start: 2024-07-21

## 2024-07-21 RX ORDER — DOCUSATE SODIUM 100 MG/1
100 CAPSULE, LIQUID FILLED ORAL 2 TIMES DAILY
Status: DISCONTINUED | OUTPATIENT
Start: 2024-07-21 | End: 2024-07-25

## 2024-07-21 RX ORDER — ZOLPIDEM TARTRATE 5 MG/1
10 TABLET ORAL
Status: CANCELLED | OUTPATIENT
Start: 2024-07-21

## 2024-07-21 RX ORDER — HYDROMORPHONE HCL/PF 1 MG/ML
0.5 SYRINGE (ML) INJECTION EVERY 2 HOUR PRN
Status: CANCELLED | OUTPATIENT
Start: 2024-07-21

## 2024-07-21 RX ORDER — PANTOPRAZOLE SODIUM 40 MG/1
40 TABLET, DELAYED RELEASE ORAL EVERY OTHER DAY
Status: CANCELLED | OUTPATIENT
Start: 2024-07-23

## 2024-07-21 RX ORDER — LOSARTAN POTASSIUM 25 MG/1
25 TABLET ORAL DAILY
Start: 2024-07-22 | End: 2024-08-01

## 2024-07-21 RX ORDER — ZOLPIDEM TARTRATE 5 MG/1
10 TABLET ORAL
Status: DISCONTINUED | OUTPATIENT
Start: 2024-07-21 | End: 2024-08-01 | Stop reason: HOSPADM

## 2024-07-21 RX ORDER — ATORVASTATIN CALCIUM 40 MG/1
40 TABLET, FILM COATED ORAL
Status: DISCONTINUED | OUTPATIENT
Start: 2024-07-21 | End: 2024-08-01 | Stop reason: HOSPADM

## 2024-07-21 RX ORDER — ONDANSETRON 2 MG/ML
4 INJECTION INTRAMUSCULAR; INTRAVENOUS EVERY 6 HOURS PRN
Status: DISCONTINUED | OUTPATIENT
Start: 2024-07-21 | End: 2024-07-27

## 2024-07-21 RX ORDER — HYDROMORPHONE HYDROCHLORIDE 4 MG/1
4 TABLET ORAL EVERY 4 HOURS PRN
Status: DISCONTINUED | OUTPATIENT
Start: 2024-07-21 | End: 2024-07-21 | Stop reason: ALTCHOICE

## 2024-07-21 RX ORDER — PANTOPRAZOLE SODIUM 40 MG/1
40 TABLET, DELAYED RELEASE ORAL EVERY OTHER DAY
Status: DISCONTINUED | OUTPATIENT
Start: 2024-07-23 | End: 2024-08-01 | Stop reason: HOSPADM

## 2024-07-21 RX ORDER — SENNOSIDES 8.6 MG
1 TABLET ORAL DAILY
Status: DISCONTINUED | OUTPATIENT
Start: 2024-07-22 | End: 2024-07-21 | Stop reason: ALTCHOICE

## 2024-07-21 RX ORDER — LOSARTAN POTASSIUM 25 MG/1
25 TABLET ORAL DAILY
Status: DISCONTINUED | OUTPATIENT
Start: 2024-07-22 | End: 2024-07-25

## 2024-07-21 RX ADMIN — ACETAMINOPHEN 975 MG: 325 TABLET ORAL at 22:07

## 2024-07-21 RX ADMIN — ATORVASTATIN CALCIUM 40 MG: 40 TABLET, FILM COATED ORAL at 15:52

## 2024-07-21 RX ADMIN — OXYCODONE HYDROCHLORIDE AND ACETAMINOPHEN 500 MG: 500 TABLET ORAL at 17:00

## 2024-07-21 RX ADMIN — ASPIRIN 81 MG: 81 TABLET, COATED ORAL at 17:00

## 2024-07-21 RX ADMIN — OXYCODONE HYDROCHLORIDE AND ACETAMINOPHEN 500 MG: 500 TABLET ORAL at 09:19

## 2024-07-21 RX ADMIN — INSULIN LISPRO 3 UNITS: 100 INJECTION, SOLUTION INTRAVENOUS; SUBCUTANEOUS at 11:42

## 2024-07-21 RX ADMIN — FOLIC ACID 1 MG: 1 TABLET ORAL at 09:20

## 2024-07-21 RX ADMIN — THIAMINE HCL TAB 100 MG 100 MG: 100 TAB at 09:20

## 2024-07-21 RX ADMIN — ESCITALOPRAM 5 MG: 5 TABLET, FILM COATED ORAL at 09:21

## 2024-07-21 RX ADMIN — DAPTOMYCIN 575 MG: 500 INJECTION, POWDER, LYOPHILIZED, FOR SOLUTION INTRAVENOUS at 16:44

## 2024-07-21 RX ADMIN — Medication 1 TABLET: at 09:20

## 2024-07-21 RX ADMIN — INSULIN LISPRO 2 UNITS: 100 INJECTION, SOLUTION INTRAVENOUS; SUBCUTANEOUS at 17:00

## 2024-07-21 RX ADMIN — DOCUSATE SODIUM 100 MG: 100 CAPSULE, LIQUID FILLED ORAL at 17:00

## 2024-07-21 RX ADMIN — PANTOPRAZOLE SODIUM 40 MG: 40 TABLET, DELAYED RELEASE ORAL at 09:20

## 2024-07-21 RX ADMIN — ACETAMINOPHEN 975 MG: 325 TABLET, FILM COATED ORAL at 06:01

## 2024-07-21 RX ADMIN — ACETAMINOPHEN 975 MG: 325 TABLET ORAL at 15:51

## 2024-07-21 RX ADMIN — INSULIN GLARGINE 25 UNITS: 100 INJECTION, SOLUTION SUBCUTANEOUS at 20:51

## 2024-07-21 RX ADMIN — ASPIRIN 81 MG: 81 TABLET, COATED ORAL at 09:19

## 2024-07-21 RX ADMIN — INSULIN GLARGINE 25 UNITS: 100 INJECTION, SOLUTION SUBCUTANEOUS at 09:20

## 2024-07-21 RX ADMIN — LOSARTAN POTASSIUM 25 MG: 25 TABLET, FILM COATED ORAL at 09:19

## 2024-07-21 RX ADMIN — LEVOTHYROXINE SODIUM 175 MCG: 125 TABLET ORAL at 06:01

## 2024-07-21 NOTE — ASSESSMENT & PLAN NOTE
Patient was started on Cozaar 25 mg daily.  Continue at [facility].  This can be uptitrated as needed.  He was previously on irbesartan hydrochlorothiazide 150-12.5 mg daily and verapamil 260 mg daily  Verapamil has been held/stopped due to orthostatic hypotension

## 2024-07-21 NOTE — ARC ADMISSION
Patient is medically cleared for discharge and is approved for admission to [facility] today. Patient will admit to [facility] and was cleared by MD for wife to transport to [facility]. Pt will arrive to [facility] between 130-2pm. Pt and wife will call facility when they arrive. Nurse report can be called to [phone].  CM has been updated with same.

## 2024-07-21 NOTE — ASSESSMENT & PLAN NOTE
History of thyroid cancer status post total thyroidectomy with subsequent surgical hypothyroidism  Continue levothyroxine daily

## 2024-07-21 NOTE — RESTORATIVE TECHNICIAN NOTE
Restorative Technician Note      Patient Name: Aaron Richards     Restorative Tech Visit Date: 07/21/24  Note Type: Mobility  Patient Position Upon Consult: Supine  Activity Performed: Ambulated; Range of motion  Assistive Device: Roller walker  Patient Position at End of Consult: All needs within reach; Bedside chair

## 2024-07-21 NOTE — PROGRESS NOTES
Progress Note - Orthopedics   Aaron Richards 59 y.o. male MRN: [id number]  Unit/Bed#: E2 -01    Assessment:  59 y.o.male POD 6 s/p R antibiotic cement TKA with Dr. 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      Plan:  WBAT RLE   DVT ppx: aspirin 81mg BID x 4 weeks  PT/OT - ROM as tolerated, pillow/blankets under achilles not behind knee while in bed  Interoperative cultures continue to be negative, ID has requested to hold the cultures for 14 days total.  Plan is for 6 weeks antibiotic treatment.  Abx per ID -currently Dapto  Possible d/c to rehab today pending bed availability  Dispo: Ortho will follow. Plan for rehab for IV abx and PT      Subjective:    Pt reports moderate right knee pain.  No interval changes in his symptoms since yesterday.    Labs:  0   Lab Value Date/Time    HCT 28.7 (L) 07/18/2024 0520    HCT 28.9 (L) 07/17/2024 0610    HCT 33.1 (L) 07/16/2024 0426    HGB 9.4 (L) 07/18/2024 0520    HGB 9.7 (L) 07/17/2024 0610    HGB 11.1 (L) 07/16/2024 0426    INR 1.08 07/01/2024 1151    WBC 12.40 (H) 07/18/2024 0520    WBC 11.62 (H) 07/17/2024 0610    WBC 11.21 (H) 07/16/2024 0426    ESR 24 (H) 02/21/2020 1101       Meds:    Current Facility-Administered Medications:     acetaminophen (TYLENOL) tablet 650 mg, 650 mg, Oral, Q4H PRN, Dionne Kuhn PA-C, 650 mg at 07/20/24 2120    acetaminophen (TYLENOL) tablet 975 mg, 975 mg, Oral, Q8H, Dionne Kuhn PA-C, 975 mg at 07/21/24 0601    ascorbic acid (VITAMIN C) tablet 500 mg, 500 mg, Oral, BID, Dionne Kuhn PA-C, 500 mg at 07/20/24 1738    aspirin (ECOTRIN LOW STRENGTH) EC tablet 81 mg, 81 mg, Oral, BID, Dionne Kuhn PA-C, 81 mg at 07/20/24 1738    atorvastatin (LIPITOR) tablet 40 mg, 40 mg, Oral, Daily With Dinner, Dionne Khun PA-C, 40 mg at 07/20/24 1738    DAPTOmycin (CUBICIN) 575 mg in sodium chloride 0.9 % 50 mL IVPB, 6 mg/kg (Adjusted), Intravenous, Q24H, Av Juarez MD, Last Rate: 100  mL/hr at 07/20/24 1739, 575 mg at 07/20/24 1739    docusate sodium (COLACE) capsule 100 mg, 100 mg, Oral, BID, Dionne Kuhn PA-C, 100 mg at 07/17/24 1204    escitalopram (LEXAPRO) tablet 5 mg, 5 mg, Oral, Daily, Dionne Kuhn PA-C, 5 mg at 07/20/24 0856    folic acid (FOLVITE) tablet 1 mg, 1 mg, Oral, Daily, Dionne Kuhn PA-C, 1 mg at 07/20/24 0855    gabapentin (NEURONTIN) capsule 300 mg, 300 mg, Oral, HS, Dionne Kuhn PA-C    HYDROmorphone (DILAUDID) injection 0.5 mg, 0.5 mg, Intravenous, Q2H PRN, Jorge Hendrix DO    HYDROmorphone (DILAUDID) tablet 2 mg, 2 mg, Oral, Q4H PRN, Clay Zavala MD, 2 mg at 07/20/24 2120    HYDROmorphone (DILAUDID) tablet 4 mg, 4 mg, Oral, Q4H PRN, Clay Zavala MD, 4 mg at 07/18/24 2020    insulin glargine (LANTUS) subcutaneous injection 25 Units 0.25 mL, 25 Units, Subcutaneous, Q12H TESS, Clay Zavala MD, 25 Units at 07/20/24 2119    insulin lispro (HumALOG/ADMELOG) 100 units/mL subcutaneous injection 1-6 Units, 1-6 Units, Subcutaneous, TID AC, 1 Units at 07/20/24 1739 **AND** Fingerstick Glucose (POCT), , , TID AC, Dionne Kuhn PA-C    levothyroxine tablet 175 mcg, 175 mcg, Oral, Early Morning, Dionne Kuhn PA-C, 175 mcg at 07/21/24 0601    losartan (COZAAR) tablet 25 mg, 25 mg, Oral, Daily, Velia Molina MD, 25 mg at 07/20/24 0855    multivitamin-minerals (CENTRUM) tablet 1 tablet, 1 tablet, Oral, Daily, Dionne Kuhn PA-C, 1 tablet at 07/20/24 0855    ondansetron (ZOFRAN) injection 4 mg, 4 mg, Intravenous, Q6H PRN, Dionne Kuhn PA-C, 4 mg at 07/19/24 0832    pantoprazole (PROTONIX) EC tablet 40 mg, 40 mg, Oral, Every Other Day, Dionne Kuhn PA-C, 40 mg at 07/19/24 0753    senna (SENOKOT) tablet 8.6 mg, 1 tablet, Oral, Daily, Dionne Kuhn PA-C, 8.6 mg at 07/20/24 0855    thiamine tablet 100 mg, 100 mg, Oral, Daily, Velia Molina MD, 100 mg at 07/20/24 0855    zolpidem (AMBIEN) tablet 10 mg, 10 mg, Oral, HS PRN, Dionne Kuhn,  PA-C    Blood Culture:   Lab Results   Component Value Date    BLOODCX No Growth After 5 Days. 10/10/2020    BLOODCX No Growth After 5 Days. 10/10/2020       Wound Culture:   Lab Results   Component Value Date    WOUNDCULT 2+ Growth of Morganella morganii (A) 08/30/2020    WOUNDCULT (A) 08/30/2020     2+ Growth of Methicillin Resistant Staphylococcus aureus    WOUNDCULT 1+ Growth of Enterobacter cloacae complex (A) 08/30/2020    WOUNDCULT 1+ Growth of Enterococcus faecalis (A) 08/30/2020       Ins and Outs:  I/O last 24 hours:  In: 120 [P.O.:120]  Out: 800 [Urine:800]        Physical:  Vitals:    07/21/24 0747   BP: 140/83   Pulse: 95   Resp: 18   Temp: 97.9 °F (36.6 °C)   SpO2: 93%         Musculoskeletal: right Lower Extremity  Dressing with mild serosanguinous drainage  Incision approximated without erythema  Knee ROM 5-80 degrees  Sensation intact   Motor intact to, +ankle dorsi/plantar flexion  Foot warm and well perfused         Mehul Torre MD

## 2024-07-21 NOTE — PLAN OF CARE
Problem: PAIN - ADULT  Goal: Verbalizes/displays adequate comfort level or baseline comfort level  Description: Interventions:  - Encourage patient to monitor pain and request assistance  - Assess pain using appropriate pain scale  - Administer analgesics based on type and severity of pain and evaluate response  - Implement non-pharmacological measures as appropriate and evaluate response  - Consider cultural and social influences on pain and pain management  - Notify physician/advanced practitioner if interventions unsuccessful or patient reports new pain  Outcome: Progressing     Problem: INFECTION - ADULT  Goal: Absence or prevention of progression during hospitalization  Description: INTERVENTIONS:  - Assess and monitor for signs and symptoms of infection  - Monitor lab/diagnostic results  - Monitor all insertion sites, i.e. indwelling lines, tubes, and drains  - Monitor endotracheal if appropriate and nasal secretions for changes in amount and color  - [place] appropriate cooling/warming therapies per order  - Administer medications as ordered  - Instruct and encourage patient and family to use good hand hygiene technique  - Identify and instruct in appropriate isolation precautions for identified infection/condition  Outcome: Progressing     Problem: SAFETY ADULT  Goal: Patient will remain free of falls  Description: INTERVENTIONS:  - Educate patient/family on patient safety including physical limitations  - Instruct patient to call for assistance with activity   - Consult OT/PT to assist with strengthening/mobility   - Keep Call bell within reach  - Keep bed low and locked with side rails adjusted as appropriate  - Keep care items and personal belongings within reach  - Initiate and maintain comfort rounds  - Make Fall Risk Sign visible to staff  - Offer Toileting every 2 Hours, in advance of need  - Initiate/Maintain bed and chair alarm  - Obtain necessary fall risk management equipment: bed and chair  alarm  - Apply yellow socks and bracelet for high fall risk patients  - Consider moving patient to room near nurses station  Outcome: Progressing

## 2024-07-21 NOTE — PROGRESS NOTES
Progress Note - Infectious Disease   Aaron Richards 59 y.o. male MRN: [id number]  Unit/Bed#: E2 -01 Encounter: [id number]      Impression/Plan:  1.  Right knee septic arthritis.  In the setting of a previous traumatic injury with hardware failure requiring hardware removal and now has the development of a sinus tract extending into the joint space with operative findings consistent with a septic arthritis.  Although previous cultures have been negative they were obtained while he has been on antibiotics.  He is now status post I&D, and placement of a spacer on 7/15/2024. CPK of 256.  Operative cultures negative thus far.  -Continue daptomycin 6 mg/kg IV every 24 hours through 8/26/2024 to complete 6 weeks total treatment.  -Check CPK weekly while on daptomycin  -Check CBC with differential and BMP to make sure no developing toxicity  -Follow-up operative cultures and adjust antibiotics needed  -Holding the cultures for 2 weeks for fastidious organisms  -Close orthopedics follow-up  -Patient is set up for follow-up in the ID office.  -Remove PICC line after last dose of intravenous antibiotics     2.  Poor dentition.  Patient with new hardware in his knee.  -Recommend OMFS evaluation for possible extractions     3.  Diabetes mellitus.  Type II.  Improved control with hemoglobin A1c of 6.2.     4.  Chronic kidney disease.  Stage III.  Renal function stable.  -Check BMP to assess stability of the renal function  -Volume management  -Dose adjust the antibiotics needed    Discussed with the primary service that the patient is okay for discharge from infectious disease standpoint and they agree with the plan    Patient likely going to [facility] in [place] once bed available     Antibiotics:  Daptomycin 7  Postop day 6    Subjective:  Patient has no fever, chills, sweats; no nausea, vomiting, diarrhea; no cough, shortness of breath; no increased pain. No new symptoms.    Objective:  Vitals:  Temp:  [97.9 °F  (36.6 °C)-99 °F (37.2 °C)] 97.9 °F (36.6 °C)  HR:  [91-95] 95  Resp:  [18] 18  BP: (117-156)/(71-83) 140/83  SpO2:  [93 %] 93 %  Temp (24hrs), Av.4 °F (36.9 °C), Min:97.9 °F (36.6 °C), Max:99 °F (37.2 °C)  Current: Temperature: 97.9 °F (36.6 °C)    Physical Exam:   General Appearance:  Alert, interactive, nontoxic, no acute distress.   Throat: Oropharynx moist without lesions.    Lungs:   Clear to auscultation bilaterally; no wheezes, rhonchi or rales; respirations unlabored   Heart:  RRR; no murmur, rub or gallop   Abdomen:   Soft, non-tender, non-distended, positive bowel sounds.     Extremities: Right knee dressed with a dry dressing in place   Skin: No new rashes or lesions. No draining wounds noted.       Labs, Imaging, & Other studies:   All pertinent labs and imaging studies were personally reviewed  Results from last 7 days   Lab Units 24  0520 24  0610 24  0426   WBC Thousand/uL 12.40* 11.62* 11.21*   HEMOGLOBIN g/dL 9.4* 9.7* 11.1*   PLATELETS Thousands/uL 172 156 185     Results from last 7 days   Lab Units 24  0610 24  0426   SODIUM mmol/L 136 136   POTASSIUM mmol/L 3.6 3.7   CHLORIDE mmol/L 107 105   CO2 mmol/L 24 24   BUN mg/dL 22 21   CREATININE mg/dL 0.71 0.78   EGFR ml/min/1.73sq m 101 97   CALCIUM mg/dL 8.2* 8.2*     Results from last 7 days   Lab Units 07/15/24  1152 07/15/24  1150 07/15/24  1149 07/15/24  1146 07/15/24  1141   GRAM STAIN RESULT  Rare Polys  No bacteria seen No Polys or Bacteria seen Rare Polys  No bacteria seen No Polys or Bacteria seen No Polys or Bacteria seen

## 2024-07-21 NOTE — NURSING NOTE
Patient admitted after lunch. Pleasant and cooperative. Transfers contact guard with walker. Dressing to incision removed, area cleansed, and new dressing applied. Educated on importance of elevating and repositioning. Picc line flushes well and antibiotics infuse without difficulty. Oriented to unit, call bell usage, therapy schdedule and repositioning. Call bell in reach. Able to reposition and move self in bed. Will continue to monitor and follow plan of care.

## 2024-07-21 NOTE — ASSESSMENT & PLAN NOTE
Patient was admitted to the orthopedic surgery service with right knee septic arthritis  Patient had previous tibial fracture surgery, and subsequent injury after a fall  Had hardware failure requiring hardware removal  Was noted to have a sinus tract extending into the joint space  Patient underwent I&D and spacer placement 7/15: Intraoperative cultures remain negative.  PICC line placed 7/19/24  Continue daptomycin until 8/26/2024, 6 weeks of treatment  Outpatient ID follow-up  DC PICC line once antibiotic course is completed (instructions on AVS)

## 2024-07-21 NOTE — NURSING NOTE
Called and provided report to [facility] [place]. Spoke with BONI Irby advised facility difficulty with printing AVS. [facility] states reconciliation was not completed. [employer] contacted and ticket opened. Facility OK with RN faxing AVS once epic issue resolved.

## 2024-07-21 NOTE — H&P
Earl Richards#  :1965 M  MRN:[id number]    CSN:[id number]  Adm Date: 2024 1415  2:15 PM   ATT PHY: Lorenzo Zepeda Md  [facility]         Chief Complaint: septic arthritis s/p R antibiotic cement TKA       History of Presenting Illness: Aaron Richards is a(n) 59 y.o. year old male who is admitted to [facility].  Patient originally presented to [facility] on 7/15 admitted under orthopedic surgery service with right knee septic arthritis. Patient previously seen in outpatient office with signs and symptoms of right knee post-traumatic osteoarthritis with right tibial plateau non-union ORIF.  Patient tried and failed conservative treatments and elected for surgical intervention.  S/p R antibiotic cement TKA with Dr. 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  ID consulted for antibiotic management.  Continue daptomycin 6 mg/kg IV every 24 hours through 2024 to complete 6 weeks total treatment.  PICC line placed on .  PT and OT consulted and recommend inpatient acute rehab services.     Patient examined at bedside.  Patient is complaining of 9/10 pain.  Due for tylenol soon.  Patient has also been experiencing significant dizziness related to orthostatic hypotension. States he has been on the same BP medications for years and this is new for him.  Also reports he has been living a very sedentary lifestyle for at least a year and believes his body isn't used to as much physical activity. Patient denies current dizziness, chest pain, shortness of breath, nausea, constipation, or urinary symptoms.     Labs pending.     Allergies   Allergen Reactions    Vancomycin Other (See Comments)     Kidney issues    Pollen Extract Eye Swelling     Eyes get watery        Current Facility-Administered Medications on File Prior to Encounter   Medication Dose Route Frequency Provider Last Rate  Last Admin    [DISCONTINUED] acetaminophen (TYLENOL) tablet 650 mg  650 mg Oral Q4H PRN Dionne Kuhn PA-C   650 mg at 07/20/24 2120    [DISCONTINUED] acetaminophen (TYLENOL) tablet 975 mg  975 mg Oral Q8H Dionne Kuhn PA-C   975 mg at 07/21/24 0601    [DISCONTINUED] ascorbic acid (VITAMIN C) tablet 500 mg  500 mg Oral BID Dionne Kuhn PA-C   500 mg at 07/21/24 0919    [DISCONTINUED] aspirin (ECOTRIN LOW STRENGTH) EC tablet 81 mg  81 mg Oral BID Dionne Kuhn PA-C   81 mg at 07/21/24 0919    [DISCONTINUED] atorvastatin (LIPITOR) tablet 40 mg  40 mg Oral Daily With Dinner Dionne Kuhn PA-C   40 mg at 07/20/24 1738    [DISCONTINUED] DAPTOmycin (CUBICIN) 575 mg in sodium chloride 0.9 % 50 mL IVPB  6 mg/kg (Adjusted) Intravenous Q24H Av Juarez MD   Stopped at 07/21/24 1155    [DISCONTINUED] DAPTOmycin (CUBICIN) 575 mg in sodium chloride 0.9 % 50 mL IVPB  6 mg/kg (Adjusted) Intravenous Q24H Av Juarez MD        [DISCONTINUED] docusate sodium (COLACE) capsule 100 mg  100 mg Oral BID Dionne Kuhn PA-C   100 mg at 07/17/24 1204    [DISCONTINUED] escitalopram (LEXAPRO) tablet 5 mg  5 mg Oral Daily Dionne Kuhn PA-C   5 mg at 07/21/24 0921    [DISCONTINUED] folic acid (FOLVITE) tablet 1 mg  1 mg Oral Daily Dionne Kuhn PA-C   1 mg at 07/21/24 0920    [DISCONTINUED] gabapentin (NEURONTIN) capsule 300 mg  300 mg Oral HS Dionne uKhn PA-C        [DISCONTINUED] HYDROmorphone (DILAUDID) injection 0.5 mg  0.5 mg Intravenous Q2H PRN Jorge Hendrix DO        [DISCONTINUED] HYDROmorphone (DILAUDID) tablet 2 mg  2 mg Oral Q4H PRN Clay Zavala MD   2 mg at 07/20/24 2120    [DISCONTINUED] HYDROmorphone (DILAUDID) tablet 4 mg  4 mg Oral Q4H PRN Clay Zavala MD   4 mg at 07/18/24 2020    [DISCONTINUED] insulin glargine (LANTUS) subcutaneous injection 25 Units 0.25 mL  25 Units Subcutaneous Q12H TESS Zavala MD   25 Units at 07/21/24 0920    [DISCONTINUED] insulin lispro  (HumALOG/ADMELOG) 100 units/mL subcutaneous injection 1-6 Units  1-6 Units Subcutaneous TID AC Dionne Kuhn PA-C   3 Units at 07/21/24 1142    [DISCONTINUED] levothyroxine tablet 175 mcg  175 mcg Oral Early Morning Dionne Kuhn PA-C   175 mcg at 07/21/24 0601    [DISCONTINUED] losartan (COZAAR) tablet 25 mg  25 mg Oral Daily Velia Molina MD   25 mg at 07/21/24 0919    [DISCONTINUED] multivitamin-minerals (CENTRUM) tablet 1 tablet  1 tablet Oral Daily Dionne Kuhn PA-C   1 tablet at 07/21/24 0920    [DISCONTINUED] ondansetron (ZOFRAN) injection 4 mg  4 mg Intravenous Q6H PRN Dionne Kuhn PA-C   4 mg at 07/19/24 0832    [DISCONTINUED] pantoprazole (PROTONIX) EC tablet 40 mg  40 mg Oral Every Other Day Dionne Kuhn PA-C   40 mg at 07/21/24 0920    [DISCONTINUED] senna (SENOKOT) tablet 8.6 mg  1 tablet Oral Daily Dionne Kuhn PA-C   8.6 mg at 07/20/24 0855    [DISCONTINUED] thiamine tablet 100 mg  100 mg Oral Daily Velia Molina MD   100 mg at 07/21/24 0920    [DISCONTINUED] zolpidem (AMBIEN) tablet 10 mg  10 mg Oral HS PRN Dionne Kuhn PA-C         Current Outpatient Medications on File Prior to Encounter   Medication Sig Dispense Refill    acetaminophen (TYLENOL) 500 mg tablet Take 2 tablets (1,000 mg total) by mouth every 8 (eight) hours 60 tablet 0    ascorbic acid (VITAMIN C) 250 mg tablet Take 250 mg by mouth daily (Patient not taking: Reported on 6/25/2024)      ascorbic acid (VITAMIN C) 500 MG tablet Take 1 tablet (500 mg total) by mouth 2 (two) times a day 60 tablet 1    aspirin (ECOTRIN LOW STRENGTH) 81 mg EC tablet Take 1 tablet (81 mg total) by mouth 2 (two) times a day 60 tablet 0    atorvastatin (LIPITOR) 40 mg tablet TAKE 1 TABLET DAILY WITH DINNER 90 tablet 3    BD Pen Needle Ijeoma U/F 32G X 4 MM MISC Inject under the skin 4 (four) times a day 400 each 0    Blood Glucose Monitoring Suppl (ONE TOUCH ULTRA 2) w/Device KIT Use 2 (two) times a day 1 kit 0    CALCIUM MAGNESIUM  ZINC PO Take 1 tablet by mouth in the morning      celecoxib (CeleBREX) 200 mg capsule Take 1 capsule (200 mg total) by mouth 2 (two) times a day 60 capsule 0    cholecalciferol (VITAMIN D3) 1,000 units tablet take 2 tablets by mouth once daily 60 tablet 3    DAPTOmycin (CUBICIN) 50 mL IVPB Inject 500 mg into a catheter in a vein over 30 minutes at 100 mL/hr every 24 hours      escitalopram (LEXAPRO) 5 mg tablet TAKE 1 TABLET DAILY 60 tablet 5    folic acid (FOLVITE) 1 mg tablet take 1 tablet by mouth once daily 30 tablet 5    ibuprofen (MOTRIN) 600 mg tablet Take 600 mg by mouth every 6 (six) hours as needed for mild pain      insulin glargine (LANTUS) 100 units/mL subcutaneous injection Inject 25 Units under the skin every 12 (twelve) hours      insulin lispro (HumALOG/ADMELOG) 100 units/mL injection Inject 1-6 Units under the skin 3 (three) times a day before meals      levothyroxine 175 mcg tablet TAKE 1 TABLET DAILY EARLY IN THE MORNING 60 tablet 5    [START ON 7/22/2024] losartan (COZAAR) 25 mg tablet Take 1 tablet (25 mg total) by mouth daily      Multiple Vitamins-Minerals (multivitamin with minerals) tablet Take 1 tablet by mouth daily 30 tablet 1    omeprazole (PriLOSEC) 40 MG capsule TAKE 1 CAPSULE DAILY (Patient taking differently: Take 40 mg by mouth every other day) 90 capsule 3    ondansetron (ZOFRAN-ODT) 4 mg disintegrating tablet Take 1 tablet (4 mg total) by mouth every 6 (six) hours as needed for nausea or vomiting 20 tablet 0    oxyCODONE (Roxicodone) 5 immediate release tablet Take 1 tablet (5 mg total) by mouth every 4 (four) hours as needed for moderate pain or severe pain for up to 10 days Max Daily Amount: 30 mg 42 tablet 0    senna-docusate sodium (SENOKOT S) 8.6-50 mg per tablet Take 1 tablet by mouth daily 30 tablet 0    verapamil (VERELAN PM) 360 MG 24 hr capsule TAKE 1 CAPSULE DAILY AT BEDTIME (Patient taking differently: Take 360 mg by mouth daily at bedtime) 90 capsule 3    Vitamin D,  Cholecalciferol, 25 MCG (1000 UT) CAPS Take by mouth (Patient not taking: Reported on 6/25/2024)      zolpidem (AMBIEN) 10 mg tablet Take 1 tablet (10 mg total) by mouth daily at bedtime as needed for sleep 90 tablet 0    [DISCONTINUED] acetaminophen (TYLENOL) 500 mg tablet Take 1,000 mg by mouth every 6 (six) hours as needed for mild pain      [DISCONTINUED] aspirin (ECOTRIN) 325 mg EC tablet Take 325 mg by mouth every 6 (six) hours as needed for mild pain (Patient not taking: Reported on 6/25/2024)      [DISCONTINUED] aspirin 81 mg chewable tablet Chew 1 tablet (81 mg total) every 12 (twelve) hours (Patient taking differently: Chew 81 mg once) 84 tablet 0    [DISCONTINUED] Insulin Glargine Solostar (Lantus SoloStar) 100 UNIT/ML SOPN INJECT 45 UNITS TWICE A DAY (Patient taking differently: Inject 45 Units under the skin 2 (two) times a day INJECT 45 UNITS TWICE A DAY) 225 mL 3    [DISCONTINUED] insulin glulisine (Apidra) 100 units/mL injection Us via sliding scale tid 60 mL 0    [DISCONTINUED] oxyCODONE (Roxicodone) 5 immediate release tablet Take 1 tablet (5 mg total) by mouth every 6 (six) hours as needed for moderate pain Max Daily Amount: 20 mg (Patient not taking: Reported on 3/25/2024) 30 tablet 0    [DISCONTINUED] traMADol (Ultram) 50 mg tablet Take 1 tablet (50 mg total) by mouth every 6 (six) hours as needed for moderate pain (Patient not taking: Reported on 2/20/2024) 40 tablet 0    [DISCONTINUED] traMADol (Ultram) 50 mg tablet Take 1 tablet (50 mg total) by mouth every 6 (six) hours as needed for moderate pain (Patient not taking: Reported on 6/25/2024) 30 tablet 0       Active Ambulatory Problems     Diagnosis Date Noted    Hypertension, essential 09/24/2018    Gastroesophageal reflux disease without esophagitis 09/24/2018    Hyperlipidemia 12/17/2009    History of nonadherence to medical treatment 02/20/2008    Right-sided tinnitus 11/26/2018    Colon polyps 09/12/2019    Elevated liver enzymes  10/24/2019    PAD (peripheral artery disease) ([facility]) 08/05/2020    Thyroid cancer ([facility]) 08/20/2020    Urinary retention 09/08/2020    Abnormal EKG 12/03/2020    Insomnia 12/03/2020    Persistent proteinuria 01/12/2021    Acquired absence of right foot ([facility]) 03/29/2021    Acquired hypothyroidism 10/18/2021    Type 2 diabetes mellitus with diabetic peripheral angiopathy without gangrene ([facility]) 02/07/2022    Dupuytren's contracture of right hand 06/07/2022    Stage 3b chronic kidney disease ([facility]) 11/15/2022    Hyperkalemia 11/15/2022    Alcoholism ([facility]) 11/15/2022    Closed fracture of right tibial plateau 02/07/2023    MARKELL (acute kidney injury) ([facility]) 02/14/2023    Chronic kidney disease-mineral and bone disorder 02/14/2023    Acquired genu varum of right lower extremity 02/21/2023    Depression and anxiety 03/03/2023    Constipation 03/09/2023    MANNY (obstructive sleep apnea) 05/01/2023    S/P hardware removal 06/20/2023    Septic arthritis of knee ([facility]) 02/06/2024    Chronic pain of right knee 07/10/2024    Type 2 diabetes mellitus without complication, with long-term current use of insulin ([facility]) 07/10/2024     Resolved Ambulatory Problems     Diagnosis Date Noted    Type 2 diabetes mellitus with hemoglobin A1c goal of less than 7.0% ([facility]) 09/24/2018    Neuropathy 09/24/2018    Diabetic polyneuropathy ([facility]) 05/07/2003    Obesity, Class I, BMI 30.0-34.9 (see actual BMI) 03/25/2010    Paronychia of great toe, right 10/29/2018    Acute suppurative otitis media of both ears without spontaneous rupture of tympanic membranes 10/29/2018    Type 2 diabetes mellitus with hyperglycemia, with long-term current use of insulin ([facility]) 11/16/2018    Diabetes mellitus ([facility]) 11/16/2018    Vertigo 11/26/2018    Multiple thyroid nodules 03/06/2020    Hyperthyroidism 07/06/2020    Health maintenance examination 07/06/2020    Gangrene of toe of right foot ([facility]) 08/04/2020    Severe sepsis ([facility]) 08/04/2020    Acute kidney injury ([facility])  08/04/2020    Hyponatremia 08/04/2020    Amputation of right great toe ([facility]) 08/20/2020    Phantom 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 ([facility]) 08/20/2020    Osteomyelitis ([facility]) 08/28/2020    MRSA bacteremia 08/30/2020    Acute kidney injury (MARKELL) with acute tubular necrosis (ATN) ([facility]) 09/06/2020    Hypomagnesemia 09/09/2020    Hypotension 10/08/2020    Acute blood loss anemia 10/08/2020    Preoperative examination 12/03/2020    Physical deconditioning 12/21/2021    Obesity, Class I, BMI 30.0-34.9 (see actual BMI) 03/25/2010    Failure to thrive in adult 03/03/2023    Closed fracture of medial plateau of right tibia 04/13/2023    Closed fracture of medial portion of right tibial plateau with malunion, subsequent encounter 11/30/2023     Past Medical History:   Diagnosis Date    Broken internal right knee prosthesis ([facility]) 01/2023    Chronic kidney disease     Colon polyp     Disease of thyroid gland     GERD (gastroesophageal reflux disease)     HHS vs DKA 11/16/2018    Hypertension        Past Surgical History:   Procedure Laterality Date    COLONOSCOPY      FOOT AMPUTATION Right     FOOT SURGERY Right 2014    IR AORTAGRAM WITH RUN-OFF  08/06/2020    IR TUNNELED CENTRAL LINE PLACEMENT  09/08/2020    IR TUNNELED CENTRAL LINE REMOVAL  09/28/2020    MD AMPUTATION FOOT TRANSMETARSAL Right 09/03/2020    Procedure: AMPUTATION TRANSMETATARSAL (TMA);  Surgeon: Tracy Murillo DPM;  Location: [facility];  Service: Podiatry    MD ARTHRP KNE CONDYLE&PLATU MEDIAL&LAT COMPARTMENTS Right 7/15/2024    Procedure: ARTHROPLASTY KNEE TOTAL;  Surgeon: Jorge Hendrix DO;  Location: [facility];  Service: Orthopedics    MD OPEN TX TIBIAL FRACTURE PROXIMAL UNICONDYLAR Right 5/1/2023    Procedure: OPEN REDUCTION W/ INTERNAL FIXATION (ORIF) RIGHT TIBIAL PLATEAU NONUNION;  Surgeon: Sesar Hilario MD;  Location: [facility];  Service: Orthopedics    MD REMOVAL IMPLANT DEEP Right 1/22/2024    Procedure: REMOVAL HARDWARE TIBIA;   Surgeon: Sesar Hilario MD;  Location: [facility];  Service: Orthopedics    VT THYROIDECTOMY TOTAL/COMPLETE N/A 02/03/2021    Procedure: TOTAL THYROIDECTOMY WITH NIMS MONITORING;  Surgeon: Sesar Garvey MD;  Location:  MAIN OR;  Service: ENT    TOE AMPUTATION Right 08/11/2020    Procedure: AMPUTATION TOE;  Surgeon: Tracy Mruillo DPM;  Location: [facility];  Service: Podiatry    US GUIDED THYROID BIOPSY  07/02/2020       Social History:   Social History     Socioeconomic History    Marital status: Single     Spouse name: None    Number of children: None    Years of education: None    Highest education level: None   Occupational History    None   Tobacco Use    Smoking status: Never    Smokeless tobacco: Never   Vaping Use    Vaping status: Never Used   Substance and Sexual Activity    Alcohol use: Not Currently     Comment: quit 1-2023    Drug use: Not Currently     Types: Marijuana    Sexual activity: Not Currently     Partners: Female   Other Topics Concern    None   Social History Narrative    Light caffeine     Wears seatbelt daily    Lives with girlfriend     Social Determinants of Health     Financial Resource Strain: Low Risk  (9/18/2023)    Overall Financial Resource Strain (CARDIA)     Difficulty of Paying Living Expenses: Not hard at all   Food Insecurity: No Food Insecurity (7/15/2024)    Hunger Vital Sign     Worried About Running Out of Food in the Last Year: Never true     Ran Out of Food in the Last Year: Never true   Transportation Needs: No Transportation Needs (7/15/2024)    PRAPARE - Transportation     Lack of Transportation (Medical): No     Lack of Transportation (Non-Medical): No   Physical Activity: Not on file   Stress: Not on file (2/9/2021)   Social Connections: Not on file   Intimate Partner Violence: Not on file (2/9/2021)   Housing Stability: Low Risk  (7/15/2024)    Housing Stability Vital Sign     Unable to Pay for Housing in the Last Year: No     Number of Times Moved in the  "Last Year: 0     Homeless in the Last Year: No       Family History:   Family History   Problem Relation Age of Onset    Cancer Mother     Hypertension Father        Review of Systems   Constitutional:  Negative for chills, fatigue and fever.   HENT: Negative.     Eyes: Negative.    Respiratory:  Negative for shortness of breath.    Cardiovascular:  Negative for chest pain and palpitations.   Gastrointestinal:  Negative for abdominal pain, constipation, diarrhea and nausea.   Genitourinary: Negative.  Negative for difficulty urinating.   Musculoskeletal:  Positive for arthralgias and joint swelling.   Skin: Negative.    Neurological:  Negative for dizziness, light-headedness and headaches.   Psychiatric/Behavioral:  Positive for sleep disturbance.        Physical Exam   Vitals: Blood pressure 161/82, pulse 96, temperature 98.8 °F (37.1 °C), temperature source Temporal, resp. rate 18, height 6' 4\" (1.93 m), weight 111 kg (245 lb 6 oz), SpO2 95%.,Body mass index is 29.87 kg/m².  Constitutional: Awake, alert, in no acute distress.  Head: Normocephalic and atraumatic.   Mouth/Throat: Oropharynx is clear and moist.    Eyes: Conjunctivae and EOM are normal.   Neck: Neck supple. No thyromegaly present.   Cardiovascular: Normal rate, regular rhythm and normal heart sounds.    Pulmonary/Chest: Effort normal and breath sounds normal.   Abdominal: Soft. Bowel sounds are normal. There is no tenderness. There is no rebound and no guarding.   Neurological: No focal deficits.   Musculoskeletal:  Nontender spine.  Skin: Skin is warm and dry. Mild edema.     Assessment     Aaron Richards is a(n) 59 y.o. male with Septic arthritis s/p R antibiotic cement TKA     Cardiac hx w/ HTN, HLD, PAD.  Continue ASA 81 mg twice daily (for 4 weeks for ortho DVT ppx, then back to daily), losartan 25 mg daily (home: irbesartan hydrochlorothiazide 150-12.5 mg daily), and atorvastatin 40 mg daily.   DM2.  Hgb A1c 6.2%.  Continue Lantus 25 units " twice daily.  SSI w/ POCT AS HS.  Carb controlled diet.  Home: Lantus 30 units BID and Apidra SSI.   Acquired hypothyroidism. Hx thyroid cancer s/p thyroidectomy.   Continue levothyroxine 175 mcg daily.   GERD.  Continue Protonix 40 mg daily.   CKD3b.  Stable.  Cr 0.71 7/17.  Avoid nephrotoxins.   MDD/anxiety.  Continue Lexapro 20 mg daily.   Hx of alcohol use.  Stable.  Sober since 2023.   Insomnia.  Continue Ambien 10 mg HS as needed.   Pain and bowel regimen.  As per physiatry.   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  Continue daptomycin 6 mg/kg IV every 24 hours through 8/26.  Check CPK weekly.  Patient receiving intensive PT OT as per physiatry.     Prognosis: Fair.    Discharge Plan: In progress.    Advanced Directives: I have discussed in detail with the patient the advanced directives. The patient's wife, Dottie Delong, [phone], is appointed as POA and has patient's living will. When discussing cardiac and pulmonary resuscitation efforts with the patient, the patient wishes to be  FULL CODE.    I have spent more than 50 minutes gathering data, doing physical examination, and discussing the advanced directives, which was witnessed by caring staff.    The patient was discussed with Dr. Zepeda and he is in agreement with the above note.

## 2024-07-21 NOTE — PLAN OF CARE
Problem: Prexisting or High Potential for Compromised Skin Integrity  Goal: Skin integrity is maintained or improved  Description: INTERVENTIONS:  - Identify patients at risk for skin breakdown  - Assess and monitor skin integrity  - Assess and monitor nutrition and hydration status  - Monitor labs   - Assess for incontinence   - Turn and reposition patient  - Assist with mobility/ambulation  - Relieve pressure over bony prominences  - Avoid friction and shearing  - Provide appropriate hygiene as needed including keeping skin clean and dry  - Evaluate need for skin moisturizer/barrier cream  - Collaborate with interdisciplinary team   - Patient/family teaching  - Consider wound care consult   Outcome: Progressing     Problem: PAIN - ADULT  Goal: Verbalizes/displays adequate comfort level or baseline comfort level  Description: Interventions:  - Encourage patient to monitor pain and request assistance  - Assess pain using appropriate pain scale  - Administer analgesics based on type and severity of pain and evaluate response  - Implement non-pharmacological measures as appropriate and evaluate response  - Consider cultural and social influences on pain and pain management  - Notify physician/advanced practitioner if interventions unsuccessful or patient reports new pain  Outcome: Progressing     Problem: SAFETY ADULT  Goal: Patient will remain free of falls  Description: INTERVENTIONS:  - Educate patient/family on patient safety including physical limitations  - Instruct patient to call for assistance with activity   - Consult OT/PT to assist with strengthening/mobility   - Keep Call bell within reach  - Keep bed low and locked with side rails adjusted as appropriate  - Keep care items and personal belongings within reach  - Initiate and maintain comfort rounds  - Make Fall Risk Sign visible to staff  - Offer Toileting every 2 Hours, in advance of need  - Initiate/Maintain bed alarm  - Obtain necessary fall risk  management equipment: call bell, gripper socks, walker, bed/chair alarm, urinal  - Apply yellow socks and bracelet for high fall risk patients  - Consider moving patient to room near nurses station  Outcome: Progressing     Problem: DISCHARGE PLANNING  Goal: Discharge to home or other facility with appropriate resources  Description: INTERVENTIONS:  - Identify barriers to discharge w/patient and caregiver  - Arrange for needed discharge resources and transportation as appropriate  - Identify discharge learning needs (meds, wound care, etc.)  - Refer to Case Management Department for coordinating discharge planning if the patient needs post-hospital services based on physician/advanced practitioner order or complex needs related to functional status, cognitive ability, or social support system  Outcome: Progressing     Problem: Knowledge Deficit  Goal: Patient/family/caregiver demonstrates understanding of disease process, treatment plan, medications, and discharge instructions  Description: Complete learning assessment and assess knowledge base.  Interventions:  - Provide teaching at level of understanding  - Provide teaching via preferred learning methods  Outcome: Progressing     Problem: INFECTION - ADULT  Goal: Absence or prevention of progression during hospitalization  Description: INTERVENTIONS:  - Assess and monitor for signs and symptoms of infection  - Monitor lab/diagnostic results  - Monitor all insertion sites, i.e. indwelling lines, tubes, and drains  - Monitor endotracheal if appropriate and nasal secretions for changes in amount and color  - [place] appropriate cooling/warming therapies per order  - Administer medications as ordered  - Instruct and encourage patient and family to use good hand hygiene technique  - Identify and instruct in appropriate isolation precautions for identified infection/condition  Outcome: Progressing     Problem: GASTROINTESTINAL - ADULT  Goal: Maintains or returns to  baseline bowel function  Description: INTERVENTIONS:  - Assess bowel function  - Encourage oral fluids to ensure adequate hydration  - Administer IV fluids if ordered to ensure adequate hydration  - Administer ordered medications as needed  - Encourage mobilization and activity  - Consider nutritional services referral to assist patient with adequate nutrition and appropriate food choices  Outcome: Progressing  Goal: Maintains adequate nutritional intake  Description: INTERVENTIONS:  - Monitor percentage of each meal consumed  - Identify factors contributing to decreased intake, treat as appropriate  - Assist with meals as needed  - Monitor I&O, weight, and lab values if indicated  - Obtain nutrition services referral as needed  Outcome: Progressing     Problem: GENITOURINARY - ADULT  Goal: Maintains or returns to baseline urinary function  Description: INTERVENTIONS:  - Assess urinary function  - Encourage oral fluids to ensure adequate hydration if ordered  - Administer IV fluids as ordered to ensure adequate hydration  - Administer ordered medications as needed  - Offer frequent toileting  - Follow urinary retention protocol if ordered  Outcome: Progressing     Problem: METABOLIC, FLUID AND ELECTROLYTES - ADULT  Goal: Electrolytes maintained within normal limits  Description: INTERVENTIONS:  - Monitor labs and assess patient for signs and symptoms of electrolyte imbalances  - Administer electrolyte replacement as ordered  - Monitor response to electrolyte replacements, including repeat lab results as appropriate  - Instruct patient on fluid and nutrition as appropriate  Outcome: Progressing  Goal: Fluid balance maintained  Description: INTERVENTIONS:  - Monitor labs   - Monitor I/O and WT  - Instruct patient on fluid and nutrition as appropriate  - Assess for signs & symptoms of volume excess or deficit  Outcome: Progressing  Goal: Glucose maintained within target range  Description: INTERVENTIONS:  - Monitor  Blood Glucose as ordered  - Assess for signs and symptoms of hyperglycemia and hypoglycemia  - Administer ordered medications to maintain glucose within target range  - Assess nutritional intake and initiate nutrition service referral as needed  Outcome: Progressing     Problem: SKIN/TISSUE INTEGRITY - ADULT  Goal: Incision(s), wounds(s) or drain site(s) healing without S/S of infection  Description: INTERVENTIONS  - Assess and document dressing, incision, wound bed, drain sites and surrounding tissue  - Provide patient and family education  - Perform skin care/dressing changes as ordered  Outcome: Progressing

## 2024-07-21 NOTE — PROGRESS NOTES
[facility]  Progress Note  Name: Aaron Richards I  MRN: [id number]  Unit/Bed#: E2 -01 I Date of Admission: 7/15/2024   Date of Service: 7/21/2024 I Hospital Day: 6    Assessment & Plan   * Septic arthritis of knee (HCC)  Assessment & Plan  Patient was admitted to the orthopedic surgery service with right knee septic arthritis  Patient had previous tibial fracture surgery, and subsequent injury after a fall  Had hardware failure requiring hardware removal  Was noted to have a sinus tract extending into the joint space  Patient underwent I&D and spacer placement 7/15: Intraoperative cultures remain negative.  PICC line placed 7/19/24  Continue daptomycin until 8/26/2024, 6 weeks of treatment  Outpatient ID follow-up  DC PICC line once antibiotic course is completed (instructions on AVS)    Type 2 diabetes mellitus without complication, with long-term current use of insulin ([facility])  Assessment & Plan  Lab Results   Component Value Date    HGBA1C 6.2 (H) 07/01/2024       Recent Labs     07/20/24  1127 07/20/24  1607 07/20/24  2107 07/21/24  0637   POCGLU 190* 179* 210* 111     A1c is at goal  Home regimen Lantus 45 units every 12 hours, and Apidra sliding scale insulin  Continue decreased dose of 25 units every 12 hours due to restricted hospital diet  He will be going to the acute rehab facility.this can be uptitrated as needed   Continue Accu-Cheks, and sliding scale insulin      PAD (peripheral artery disease) ([facility])  Assessment & Plan  Patient was seen by vascular 4/2024  Patient with peripheral arterial disease, with previous right popliteal artery stent, and subsequent TMA  Currently on aspirin 81 mg twice daily (Ortho DVT prophylaxis for 4 weeks)  Continue Lipitor 40 mg daily    Hyperlipidemia  Assessment & Plan  Continue Lipitor 40 mg daily    Hypertension, essential  Assessment & Plan  Patient was started on Cozaar 25 mg daily.  Continue at [facility].  This can be uptitrated as  needed.  He was previously on irbesartan hydrochlorothiazide 150-12.5 mg daily and verapamil 260 mg daily  Verapamil has been held/stopped due to orthostatic hypotension    Acquired hypothyroidism  Assessment & Plan  History of thyroid cancer status post total thyroidectomy with subsequent surgical hypothyroidism  Continue levothyroxine daily    Gastroesophageal reflux disease without esophagitis  Assessment & Plan  Continue Protonix 40 mg daily             VTE Pharmacologic Prophylaxis: VTE Score: 10  Aspirin 81 mg BID per Ortho    Discussions with Specialists or Other Care Team Provider: Discussed with case management    Current Length of Stay: 6 day(s)  Current Patient Status: Inpatient   Certification Statement: The patient will continue to require additional inpatient hospital stay due to He will be discharged today  Discharge Plan: [facility] is following this patient on consult. They are medically stable for discharge when deemed appropriate by primary service.    Code Status: Level 1 - Full Code    Subjective:   See and examined  He feels well and is eager to move on to rehab  He said his wife will pick him up because transport will not be covered by his insurance    Objective:     Vitals:   Temp (24hrs), Av.4 °F (36.9 °C), Min:97.9 °F (36.6 °C), Max:99 °F (37.2 °C)    Temp:  [97.9 °F (36.6 °C)-99 °F (37.2 °C)] 97.9 °F (36.6 °C)  HR:  [91-95] 95  Resp:  [18] 18  BP: (117-156)/(71-83) 140/83  SpO2:  [93 %] 93 %  Body mass index is 29.82 kg/m².     Input and Output Summary (last 24 hours):     Intake/Output Summary (Last 24 hours) at 2024 1009  Last data filed at 2024 0900  Gross per 24 hour   Intake 240 ml   Output 800 ml   Net -560 ml       Physical Exam:   Physical Exam  Vitals reviewed.   Constitutional:       Appearance: He is not ill-appearing.   HENT:      Head: Normocephalic and atraumatic.      Nose: No congestion or rhinorrhea.   Eyes:      General: No scleral icterus.  Cardiovascular:       Rate and Rhythm: Normal rate and regular rhythm.   Pulmonary:      Breath sounds: No wheezing or rhonchi.   Abdominal:      Palpations: Abdomen is soft.      Tenderness: There is no abdominal tenderness. There is no guarding.   Musculoskeletal:      Comments: Picc line without bleeding or sign of infection   Skin:     General: Skin is warm and dry.   Neurological:      Mental Status: He is oriented to person, place, and time.   Psychiatric:         Mood and Affect: Mood normal.         Behavior: Behavior normal.          Additional Data:     Labs:  Results from last 7 days   Lab Units 07/18/24  0520 07/17/24  0610 07/16/24  0426   WBC Thousand/uL 12.40*   < > 11.21*   HEMOGLOBIN g/dL 9.4*   < > 11.1*   HEMATOCRIT % 28.7*   < > 33.1*   PLATELETS Thousands/uL 172   < > 185   SEGS PCT %  --   --  66   LYMPHO PCT %  --   --  17   MONO PCT %  --   --  15*   EOS PCT %  --   --  2    < > = values in this interval not displayed.     Results from last 7 days   Lab Units 07/17/24  0610   SODIUM mmol/L 136   POTASSIUM mmol/L 3.6   CHLORIDE mmol/L 107   CO2 mmol/L 24   BUN mg/dL 22   CREATININE mg/dL 0.71   ANION GAP mmol/L 5   CALCIUM mg/dL 8.2*   GLUCOSE RANDOM mg/dL 85         Results from last 7 days   Lab Units 07/21/24  0637 07/20/24  2107 07/20/24  1607 07/20/24  1127 07/20/24  0724 07/19/24  2124 07/19/24  1544 07/19/24  1201 07/19/24  0758 07/18/24  2101 07/18/24  1557 07/18/24  1112   POC GLUCOSE mg/dl 111 210* 179* 190* 168* 223* 187* 215* 106 175* 191* 189*               Lines/Drains:  Invasive Devices       Peripherally Inserted Central Catheter Line  Duration             PICC Line 07/19/24 Right Basilic 1 day                    Central Line:  Goal for removal: N/A - Discharging with PICC for IV ABX/medications             Imaging: Reviewed radiology reports from this admission including: procedure reports    Recent Cultures (last 7 days):   Results from last 7 days   Lab Units 07/15/24  1152 07/15/24  1150  07/15/24  1149 07/15/24  1146 07/15/24  1141   GRAM STAIN RESULT  Rare Polys  No bacteria seen No Polys or Bacteria seen Rare Polys  No bacteria seen No Polys or Bacteria seen No Polys or Bacteria seen       Last 24 Hours Medication List:   Current Facility-Administered Medications   Medication Dose Route Frequency Provider Last Rate    acetaminophen  650 mg Oral Q4H PRN Dionne Kuhn PA-C      acetaminophen  975 mg Oral Q8H Dionne Kuhn PA-C      ascorbic acid  500 mg Oral BID Dionne Kuhn PA-C      aspirin  81 mg Oral BID Dionne Kuhn PA-C      atorvastatin  40 mg Oral Daily With Dinner Dionne Kuhn PA-C      DAPTOmycin  6 mg/kg (Adjusted) Intravenous Q24H Av Juarez MD      docusate sodium  100 mg Oral BID Dionne Kuhn PA-C      escitalopram  5 mg Oral Daily Dionne Kuhn PA-C      folic acid  1 mg Oral Daily Dionne Kuhn PA-C      gabapentin  300 mg Oral HS Dionne Kuhn PA-C      HYDROmorphone  0.5 mg Intravenous Q2H PRN Jorge Hendrix DO      HYDROmorphone  2 mg Oral Q4H PRN Clay Zavala MD      HYDROmorphone  4 mg Oral Q4H PRN Clay Zavala MD      insulin glargine  25 Units Subcutaneous Q12H TESS Clay Zavala MD      insulin lispro  1-6 Units Subcutaneous TID AC Dionne Kuhn PA-C      levothyroxine  175 mcg Oral Early Morning Dionne Kuhn PA-C      losartan  25 mg Oral Daily Velia Molina MD      multivitamin-minerals  1 tablet Oral Daily Dionne Kuhn PA-C      ondansetron  4 mg Intravenous Q6H PRN Dionne Kuhn PA-C      pantoprazole  40 mg Oral Every Other Day Dionne Kuhn PA-C      senna  1 tablet Oral Daily Dionne Kuhn PA-C      thiamine  100 mg Oral Daily Velia Molina MD      zolpidem  10 mg Oral HS PRN Dionne Kuhn PA-C          Today, Patient Was Seen By: Aguilar Fletcher MD    **Please Note: This note may have been constructed using a voice recognition system.**

## 2024-07-21 NOTE — CASE MANAGEMENT
Case Management Discharge Planning Note    Patient name Aaron Richards  Location [facility] /E2 -* MRN [id number]  : 1965 Date 2024       Current Admission Date: 7/15/2024  Current Admission Diagnosis:Septic arthritis of knee (HCC)   Patient Active Problem List    Diagnosis Date Noted Date Diagnosed    Chronic pain of right knee 07/10/2024     Type 2 diabetes mellitus without complication, with long-term current use of insulin ([facility]) 07/10/2024     Septic arthritis of knee ([facility]) 2024     S/P hardware removal 2023     MANNY (obstructive sleep apnea) 2023     Constipation 2023     Depression and anxiety 2023     Acquired genu varum of right lower extremity 2023     MARKELL (acute kidney injury) ([facility]) 2023     Chronic kidney disease-mineral and bone disorder 2023     Closed fracture of right tibial plateau 2023     Stage 3b chronic kidney disease ([facility]) 11/15/2022     Hyperkalemia 11/15/2022     Alcoholism ([facility]) 11/15/2022     Dupuytren's contracture of right hand 2022     Type 2 diabetes mellitus with diabetic peripheral angiopathy without gangrene ([facility]) 2022     Acquired hypothyroidism 10/18/2021     Acquired absence of right foot ([facility]) 2021     Persistent proteinuria 2021     Abnormal EKG 2020     Insomnia 2020     Urinary retention 2020     Thyroid cancer ([facility]) 2020     PAD (peripheral artery disease) ([facility]) 2020     Elevated liver enzymes 10/24/2019     Colon polyps 2019     Right-sided tinnitus 2018     Hypertension, essential 2018     Gastroesophageal reflux disease without esophagitis 2018     Hyperlipidemia 2009     History of nonadherence to medical treatment 2008       LOS (days): 6  Geometric Mean LOS (GMLOS) (days): 1.7  Days to GMLOS:-4.2     OBJECTIVE:  Risk of Unplanned Readmission Score: 13.09         Current admission status: Inpatient    Preferred Pharmacy:   [id number] [place] - [address] [place] [id number]  Phone: [phone] Fax: [phone]    [facility] - [place], [place] - [address]  [address] [address]  Phone: [phone] Fax: [phone]    Primary Care Provider: Oswaldo Prather MD    Primary Insurance: MEDICARE  Secondary Insurance: BLUE CROSS    DISCHARGE DETAILS:    Discharge planning discussed with:: Patient, Spouse CELIA Barney Team  Freedom of Choice: Yes  Comments - Freedom of Choice: Pt discharging to [facility] Acute Rehab [facility] contacted family/caregiver?: Yes  Were Treatment Team discharge recommendations reviewed with patient/caregiver?: Yes  Did patient/caregiver verbalize understanding of patient care needs?: Yes  Were patient/caregiver advised of the risks associated with not following Treatment Team discharge recommendations?: Yes    Contacts  Patient Contacts: Dottie Delong (Spouse) [phone]  Relationship to Patient:: Family  Contact Method: Phone  Phone Number: [phone]  Reason/Outcome: Continuity of Care, Emergency Contact, Referral, Discharge Planning    Discharge Destination Plan:: Acute Rehab  Transport at Discharge : Automobile, Family    IMM Given (Date):: 07/21/24  IMM Given to:: Patient    Additional Comments: CM coordinated with [facility] to facilitate Pt's discharge today. Pt will transport by [facility] via his spouse, as he is not able to afford potential co-pays for the transport. Pt his spouse and CM discussed via 3-way call at which time CM identified Pt's current level of mobility, and reviewed risks and liablityfor falls. Pt reports he is able to sit up, self transfer, walk short distance with assistive device and PT/OT. Pt is confident he will be safe transporting by car. [facility] team to wheel Pt down to the SUV and assist Pt into the vehicle. Contact number to call for someone to assist  Pt in the [facility] was given to Pt and Spouse. Final details of Pt's discharge reviewed with Pt's Spouse prior to his discharge fron hopsital. [facility] Liaison alerted to Pt's departure. CM remains available through discharge as needed.

## 2024-07-21 NOTE — ASSESSMENT & PLAN NOTE
Lab Results   Component Value Date    HGBA1C 6.2 (H) 07/01/2024       Recent Labs     07/20/24  1127 07/20/24  1607 07/20/24  2107 07/21/24  0637   POCGLU 190* 179* 210* 111     A1c is at goal  Home regimen Lantus 45 units every 12 hours, and Apidra sliding scale insulin  Continue decreased dose of 25 units every 12 hours due to restricted hospital diet  He will be going to the acute rehab facility.this can be uptitrated as needed   Continue Accu-Cheks, and sliding scale insulin

## 2024-07-21 NOTE — DISCHARGE SUMMARY
ORTHOPEDICS DISCHARGE SUMMARY  Aaron Richards 59 y.o. male MRN: [id number]  Unit/Bed#:     Attending Physician: Jorge Hendrix DO    Admitting diagnosis: Post-traumatic osteoarthritis of right knee [M17.31]  Closed fracture of medial portion of right tibial plateau with malunion, subsequent encounter [S82.131P]    Discharge diagnosis: Post-traumatic osteoarthritis of right knee [M17.31]  Closed fracture of medial portion of right tibial plateau with malunion, subsequent encounter [S82.131P]    Date of admission: 7/15/2024    Date of discharge: 07/21/24         Procedure: antibiotic cement total knee for infected tibial plateau non-union with sinus tract    HPI:  This is a 59 y.o. year old male that presented to the office with signs and symptoms of right knee post-traumatic osteoarthritis with right tibial plateau non-union ORIF. They tried and failed conservative treatment measures and wished to proceed with surgical intervention. The risks, benefits, and complications of the procedure were discussed with the patient and informed consent was obtained.    Hospital Course:  The patient was admitted to the hospital on 7/15/2024 and underwent a complicated right hand crafted antibiotic cement total knee arthroplasty with augmentation. They were transferred to the floor after a brief stay in the post-anesthesia care unit. Their pain was well managed with IV and oral pain medications. They began therapy on post operative day #1. ASA 81mg BID was also started for DVT prophylaxis 12 hours post operatively. Patient was placed on IV antibiotics and had a PICC placed for 6 weeks of antibiotic treatment.  On discharge date pt was cleared by PT and the medicine team and determined to be safe for discharge to rehab facility.  Daily discussion was had with the patient, nursing staff, orthopaedic team, and family members if present.  All questions were answered to the patients satisifaction.      0   Lab Value Date/Time    HGB  9.4 (L) 07/18/2024 0520    HGB 9.7 (L) 07/17/2024 0610    HGB 11.1 (L) 07/16/2024 0426    HGB 13.9 07/01/2024 1151    HGB 14.4 01/15/2024 1015    HGB 13.4 10/19/2023 0957    HGB 13.2 08/15/2023 1702    HGB 14.0 08/04/2023 1041    HGB 9.1 (L) 05/29/2023 0537    HGB 8.3 (L) 05/22/2023 0608    HGB 8.4 (L) 05/17/2023 0608    HGB 9.0 (L) 05/15/2023 0553    HGB 8.7 (L) 05/11/2023 0539    HGB 8.0 (L) 05/09/2023 0522    HGB 8.3 (L) 05/07/2023 0537    HGB 7.9 (L) 05/05/2023 1448    HGB 8.0 (L) 05/05/2023 0922    HGB 8.1 (L) 05/04/2023 1807    HGB 8.0 (L) 05/04/2023 0813    HGB 8.0 (L) 05/03/2023 2038    HGB 8.1 (L) 05/03/2023 0459    HGB 9.1 (L) 05/02/2023 0502    HGB 10.2 (L) 05/01/2023 1703    HGB 11.4 (L) 04/24/2023 1206    HGB 10.3 (L) 03/07/2023 0542    HGB 10.5 (L) 03/06/2023 0451    HGB 10.2 (L) 03/05/2023 0442    HGB 10.7 (L) 03/04/2023 0555    HGB 11.4 (L) 03/03/2023 1243    HGB 12.7 01/31/2023 1536    HGB 15.2 11/14/2022 1428    HGB 13.7 12/10/2020 1109    HGB 11.5 (L) 10/10/2020 1011    HGB 10.9 (L) 09/27/2020 1316    HGB 10.1 (L) 09/23/2020 1138    HGB 9.8 (L) 09/19/2020 0609    HGB 9.8 (L) 09/18/2020 0604    HGB 9.7 (L) 09/17/2020 0557    HGB 9.6 (L) 09/16/2020 0512    HGB 9.5 (L) 09/15/2020 0630    HGB 9.2 (L) 09/14/2020 0437    HGB 9.6 (L) 09/13/2020 0642    HGB 9.6 (L) 09/12/2020 0759    HGB 9.3 (L) 09/11/2020 0448    HGB 9.4 (L) 09/10/2020 0522    HGB 9.5 (L) 09/09/2020 0435    HGB 10.1 (L) 09/08/2020 0648    HGB 10.8 (L) 09/05/2020 0611    HGB 11.1 (L) 09/01/2020 0657    HGB 12.5 08/31/2020 0615    HGB 10.8 (L) 08/30/2020 0407    HGB 11.2 (L) 08/29/2020 0519    HGB 12.3 08/28/2020 1558    HGB 11.5 (L) 08/12/2020 1039    HGB 12.1 08/11/2020 0543    HGB 12.1 08/08/2020 0538    HGB 11.2 (L) 08/07/2020 0624    HGB 11.7 (L) 08/06/2020 1155    HGB 11.6 (L) 08/05/2020 0506    HGB 14.1 08/04/2020 1358    HGB 14.3 09/27/2019 1212    HGB 16.6 11/15/2018 0942       Patient experienced Greater than 2 gram drop which  qualifies for diagnosis of acute blood loss anemia.  Vital signs remained stable and pt was resuscitated with IVF as needed     Discharge Instructions:  The patient was discharged weight bearing as tolerated to the right lower extremity. ASA 81mg twice daily for 4 weeks total.  Continue PT/OT. Take pain medications as instructed.  Continue with Dapto per Infectious Disease for antibiotic treatment.  Follow up with Dr Hendrix and Infectious Disease in 2 weeks.    Discharge Medications:  For the complete list of discharge medications, please refer to the patient's medication reconciliation.

## 2024-07-21 NOTE — DISCHARGE INSTR - AVS FIRST PAGE
Rehabilitation Instructions:     WEIGHT BEARING STATUS:  Weightbearing as tolerated right lower extremity      ACTIVITY:   Please follow mobility, self care instructions as your were taught by inpatient rehabilitation therapists.    Please continue using wheelchair, rolling walker and adaptive equipment recommended.    You will continue your rehabilitation with [facility] health care RN, PT, OT.       RESTRICTIONS:  No Driving until cleared by orthopedic surgery  No strenuous house chores or work, until cleared by orthopedic surgery      INCISIONAL CARE:  Per Dr. Hendrix -odilia to shower and allow water to run over incision.  Pat dry and allow to air dry.  Then once completely dry, keep covered for protection with ABD pad followed by Kerlix gauze.  DO NOT SUBMERGE INCISION IN WATER - NO TUB BATHS, POOLS, HOT TUBS      HOME CARE RN INSTRUCTIONS:  IV Anitibiotics thorugh 8/26/24.  Ok to remove PICC line after last dose  Weekly labs are required and to be sent to [facility] Infect[facility] starting 8/5/24. CBC with Diff, CMP, and CK level.          ID discharge instructions:   OK for RN TO REMOVE PICC AFTER FINAL DOSE OF IV ANTIBIOTIC ON 8/26/2024.  Weekly CBC and Diff, CMP, and CK levels every Monday starting 8/5/24  You will be set up with an appointment in the outpatient Infectious Disease office. It is important for you to keep this appointment in order for us to monitor you while you are on IV antibiotics.  This will include evaluating your lab work, examining your PICC line, and making sure you are not having toxicities or side effects of the medication(s) you are receiving.   ------------------------------------------------------------------------------------------------------------            Dr. Hendrix Knee Replacement    What to Expect/Activity  It is normal to have some discomfort in your knee for several days to weeks.  You are weight bearing as tolerated to your operative leg with assist  devices.  Please use crutches/walker when ambulating until your follow-up  Swelling and discomfort in the knee is normal for several days after surgery. For the first 2-3 days, use ice around the knee to help. Use for 20-30 minutes every 1-2 hours for 48 hours, while awake. You may continue beyond 48 hours as needed.  Place one or two pillows underneath your calf, not your knee, to reduce swelling.  Physical therapy on your own at home should start as soon as possible (see below). Please perform heel slides and extension exercises on your own as well (see diagram).  Please use incentive spirometer 10 times per hour while awake (see diagram).    Dressing/Wound Care/Bathing  You may remove your toe-to-groin dressing 24 hours after surgery. There will be a surgical dressing over your incision that stays in place until follow-up unless water gets under the bandage and then it should be removed.   You may start showering 24 hours after surgery, the surgical dressing will remain in place. Please pat the dressing dry. If you notice the dressing appears saturated or is starting to come off, please replace with dry dressing.  You can keep the dressing in place until follow-up in the office.   Do not place any creams, ointments or gels on or around the incision.  No baths, swimming or submerging until cleared by Dr. Hendrix    Pain Management/Medications  You may resume your usual medications.  Please take the following medications:  Anti-coagulation (blood clot prevention) - aspirin 81mg twice daily for 4 weeks  Pain medication:  Narcotic: Take as directed  NSAID/Anti-inflammatory: Take as directed  Tylenol 1000mg every 8 hours  Zofran (ondasetron) - 4mg every 8 hours as needed for nausea  Stool softeners (senna/colace) - take daily to prevent constipation as narcotic pain medication causes constipation  Antibiotic - take as directed if prescribed   If you have questions or pain concerns, please contact the office. Pain  medication cannot remove all post-operative pain.    Follow up/Call if:  The findings of your surgery will be explained to you and your family immediately after surgery. However, in the post-operative period, during recovery from anesthesia you may not fully remember or fully understand what was said. This will be again gone over when you return for your post-op appointment.  Please contact Dr. Hendrix's office if you experience the following:  Excessive bleeding (bleeding through your dressing)  Fever greater than 101 degrees F after 48 hours (low grade fevers the day or two after surgery are normal)  Persistent nausea or vomiting  Decreased sensation or discoloration of the operative limb  Pain or swelling that is getting worse and not better with medication    Dr. Hendrix's Office Contact: [phone]

## 2024-07-21 NOTE — RESTORATIVE TECHNICIAN NOTE
Restorative Technician Note      Patient Name: Aaron Richards     Restorative Tech Visit Date: 07/21/24  Note Type: Mobility  Patient Position Upon Consult: Supine  Activity Performed: Ambulated; Range of motion  Assistive Device: Roller walker  Patient Position at End of Consult: All needs within reach; Supine

## 2024-07-21 NOTE — PLAN OF CARE
Problem: Prexisting or High Potential for Compromised Skin Integrity  Goal: Skin integrity is maintained or improved  Description: INTERVENTIONS:  - Identify patients at risk for skin breakdown  - Assess and monitor skin integrity  - Assess and monitor nutrition and hydration status  - Monitor labs   - Assess for incontinence   - Turn and reposition patient  - Assist with mobility/ambulation  - Relieve pressure over bony prominences  - Avoid friction and shearing  - Provide appropriate hygiene as needed including keeping skin clean and dry  - Evaluate need for skin moisturizer/barrier cream  - Collaborate with interdisciplinary team   - Patient/family teaching  - Consider wound care consult   Outcome: Progressing     Problem: PAIN - ADULT  Goal: Verbalizes/displays adequate comfort level or baseline comfort level  Description: Interventions:  - Encourage patient to monitor pain and request assistance  - Assess pain using appropriate pain scale  - Administer analgesics based on type and severity of pain and evaluate response  - Implement non-pharmacological measures as appropriate and evaluate response  - Consider cultural and social influences on pain and pain management  - Notify physician/advanced practitioner if interventions unsuccessful or patient reports new pain  Outcome: Progressing     Problem: SAFETY ADULT  Goal: Patient will remain free of falls  Description: INTERVENTIONS:  - Educate patient/family on patient safety including physical limitations  - Instruct patient to call for assistance with activity   - Consult OT/PT to assist with strengthening/mobility   - Keep Call bell within reach  - Keep bed low and locked with side rails adjusted as appropriate  - Keep care items and personal belongings within reach  - Initiate and maintain comfort rounds  - Make Fall Risk Sign visible to staff  - Offer Toileting every 2 Hours, in advance of need  - Initiate/Maintain bed alarm  - Obtain necessary fall risk  management equipment: call bell, gripper socks, walker, bed/chair alarm, urinal  - Apply yellow socks and bracelet for high fall risk patients  - Consider moving patient to room near nurses station  Outcome: Progressing     Problem: GASTROINTESTINAL - ADULT  Goal: Maintains or returns to baseline bowel function  Description: INTERVENTIONS:  - Assess bowel function  - Encourage oral fluids to ensure adequate hydration  - Administer IV fluids if ordered to ensure adequate hydration  - Administer ordered medications as needed  - Encourage mobilization and activity  - Consider nutritional services referral to assist patient with adequate nutrition and appropriate food choices  Outcome: Progressing     Problem: GENITOURINARY - ADULT  Goal: Maintains or returns to baseline urinary function  Description: INTERVENTIONS:  - Assess urinary function  - Encourage oral fluids to ensure adequate hydration if ordered  - Administer IV fluids as ordered to ensure adequate hydration  - Administer ordered medications as needed  - Offer frequent toileting  - Follow urinary retention protocol if ordered  Outcome: Progressing     Problem: METABOLIC, FLUID AND ELECTROLYTES - ADULT  Goal: Electrolytes maintained within normal limits  Description: INTERVENTIONS:  - Monitor labs and assess patient for signs and symptoms of electrolyte imbalances  - Administer electrolyte replacement as ordered  - Monitor response to electrolyte replacements, including repeat lab results as appropriate  - Instruct patient on fluid and nutrition as appropriate  Outcome: Progressing

## 2024-07-22 ENCOUNTER — TELEPHONE (OUTPATIENT)
Dept: INFECTIOUS DISEASES | Facility: CLINIC | Age: 59
End: 2024-07-22

## 2024-07-22 DIAGNOSIS — M00.9 PYOGENIC ARTHRITIS OF RIGHT KNEE JOINT, DUE TO UNSPECIFIED ORGANISM (HCC): Primary | ICD-10-CM

## 2024-07-22 PROBLEM — K08.9 POOR DENTITION: Status: ACTIVE | Noted: 2024-07-22

## 2024-07-22 PROBLEM — E11.40 DIABETIC NEUROPATHY (HCC): Status: ACTIVE | Noted: 2024-07-22

## 2024-07-22 PROBLEM — D64.9 ANEMIA: Status: ACTIVE | Noted: 2024-07-22

## 2024-07-22 PROBLEM — D72.829 LEUKOCYTOSIS: Status: ACTIVE | Noted: 2024-07-22

## 2024-07-22 PROBLEM — Z91.89 AT RISK FOR VENOUS THROMBOEMBOLISM (VTE): Status: ACTIVE | Noted: 2024-07-22

## 2024-07-22 PROBLEM — R52 PAIN: Status: ACTIVE | Noted: 2024-07-22

## 2024-07-22 LAB
ALBUMIN SERPL BCG-MCNC: 2.8 G/DL (ref 3.5–5)
ALP SERPL-CCNC: 97 U/L (ref 34–104)
ALT SERPL W P-5'-P-CCNC: 21 U/L (ref 7–52)
ANION GAP SERPL CALCULATED.3IONS-SCNC: 7 MMOL/L (ref 4–13)
AST SERPL W P-5'-P-CCNC: 20 U/L (ref 13–39)
BASOPHILS # BLD AUTO: 0.03 THOUSANDS/ÂΜL (ref 0–0.1)
BASOPHILS NFR BLD AUTO: 0 % (ref 0–1)
BILIRUB SERPL-MCNC: 0.55 MG/DL (ref 0.2–1)
BUN SERPL-MCNC: 17 MG/DL (ref 5–25)
CALCIUM ALBUM COR SERPL-MCNC: 9.4 MG/DL (ref 8.3–10.1)
CALCIUM SERPL-MCNC: 8.4 MG/DL (ref 8.4–10.2)
CHLORIDE SERPL-SCNC: 106 MMOL/L (ref 96–108)
CK SERPL-CCNC: 112 U/L (ref 39–308)
CO2 SERPL-SCNC: 27 MMOL/L (ref 21–32)
CREAT SERPL-MCNC: 0.65 MG/DL (ref 0.6–1.3)
EOSINOPHIL # BLD AUTO: 1.22 THOUSAND/ÂΜL (ref 0–0.61)
EOSINOPHIL NFR BLD AUTO: 9 % (ref 0–6)
ERYTHROCYTE [DISTWIDTH] IN BLOOD BY AUTOMATED COUNT: 13.7 % (ref 11.6–15.1)
FUNGUS SPEC CULT: NORMAL
GFR SERPL CREATININE-BSD FRML MDRD: 105 ML/MIN/1.73SQ M
GLUCOSE P FAST SERPL-MCNC: 104 MG/DL (ref 65–99)
GLUCOSE SERPL-MCNC: 104 MG/DL (ref 65–140)
GLUCOSE SERPL-MCNC: 117 MG/DL (ref 65–140)
GLUCOSE SERPL-MCNC: 167 MG/DL (ref 65–140)
GLUCOSE SERPL-MCNC: 177 MG/DL (ref 65–140)
GLUCOSE SERPL-MCNC: 184 MG/DL (ref 65–140)
HCT VFR BLD AUTO: 31.3 % (ref 36.5–49.3)
HGB BLD-MCNC: 10.1 G/DL (ref 12–17)
IMM GRANULOCYTES # BLD AUTO: 0.13 THOUSAND/UL (ref 0–0.2)
IMM GRANULOCYTES NFR BLD AUTO: 1 % (ref 0–2)
LYMPHOCYTES # BLD AUTO: 1.93 THOUSANDS/ÂΜL (ref 0.6–4.47)
LYMPHOCYTES NFR BLD AUTO: 15 % (ref 14–44)
MCH RBC QN AUTO: 30.1 PG (ref 26.8–34.3)
MCHC RBC AUTO-ENTMCNC: 32.3 G/DL (ref 31.4–37.4)
MCV RBC AUTO: 93 FL (ref 82–98)
MONOCYTES # BLD AUTO: 1.79 THOUSAND/ÂΜL (ref 0.17–1.22)
MONOCYTES NFR BLD AUTO: 14 % (ref 4–12)
NEUTROPHILS # BLD AUTO: 7.88 THOUSANDS/ÂΜL (ref 1.85–7.62)
NEUTS SEG NFR BLD AUTO: 61 % (ref 43–75)
NRBC BLD AUTO-RTO: 0 /100 WBCS
PLATELET # BLD AUTO: 312 THOUSANDS/UL (ref 149–390)
PMV BLD AUTO: 11.6 FL (ref 8.9–12.7)
POTASSIUM SERPL-SCNC: 4 MMOL/L (ref 3.5–5.3)
PROT SERPL-MCNC: 5.5 G/DL (ref 6.4–8.4)
RBC # BLD AUTO: 3.35 MILLION/UL (ref 3.88–5.62)
SODIUM SERPL-SCNC: 140 MMOL/L (ref 135–147)
WBC # BLD AUTO: 12.98 THOUSAND/UL (ref 4.31–10.16)

## 2024-07-22 PROCEDURE — 97542 WHEELCHAIR MNGMENT TRAINING: CPT

## 2024-07-22 PROCEDURE — 82550 ASSAY OF CK (CPK): CPT

## 2024-07-22 PROCEDURE — 97110 THERAPEUTIC EXERCISES: CPT

## 2024-07-22 PROCEDURE — 97116 GAIT TRAINING THERAPY: CPT

## 2024-07-22 PROCEDURE — 82948 REAGENT STRIP/BLOOD GLUCOSE: CPT

## 2024-07-22 PROCEDURE — 97530 THERAPEUTIC ACTIVITIES: CPT

## 2024-07-22 PROCEDURE — 80053 COMPREHEN METABOLIC PANEL: CPT

## 2024-07-22 PROCEDURE — 97167 OT EVAL HIGH COMPLEX 60 MIN: CPT

## 2024-07-22 PROCEDURE — 85025 COMPLETE CBC W/AUTO DIFF WBC: CPT

## 2024-07-22 PROCEDURE — 99232 SBSQ HOSP IP/OBS MODERATE 35: CPT

## 2024-07-22 PROCEDURE — 97535 SELF CARE MNGMENT TRAINING: CPT

## 2024-07-22 PROCEDURE — 97163 PT EVAL HIGH COMPLEX 45 MIN: CPT

## 2024-07-22 RX ADMIN — LEVOTHYROXINE SODIUM 175 MCG: 100 TABLET ORAL at 05:37

## 2024-07-22 RX ADMIN — INSULIN GLARGINE 25 UNITS: 100 INJECTION, SOLUTION SUBCUTANEOUS at 09:18

## 2024-07-22 RX ADMIN — ACETAMINOPHEN 975 MG: 325 TABLET ORAL at 22:02

## 2024-07-22 RX ADMIN — ATORVASTATIN CALCIUM 40 MG: 40 TABLET, FILM COATED ORAL at 17:12

## 2024-07-22 RX ADMIN — OXYCODONE HYDROCHLORIDE AND ACETAMINOPHEN 500 MG: 500 TABLET ORAL at 09:18

## 2024-07-22 RX ADMIN — ASPIRIN 81 MG: 81 TABLET, COATED ORAL at 09:18

## 2024-07-22 RX ADMIN — INSULIN GLARGINE 25 UNITS: 100 INJECTION, SOLUTION SUBCUTANEOUS at 20:31

## 2024-07-22 RX ADMIN — ESCITALOPRAM OXALATE 5 MG: 5 TABLET, FILM COATED ORAL at 09:18

## 2024-07-22 RX ADMIN — LOSARTAN POTASSIUM 25 MG: 25 TABLET, FILM COATED ORAL at 09:18

## 2024-07-22 RX ADMIN — ACETAMINOPHEN 975 MG: 325 TABLET ORAL at 06:07

## 2024-07-22 RX ADMIN — ACETAMINOPHEN 975 MG: 325 TABLET ORAL at 14:14

## 2024-07-22 RX ADMIN — INSULIN LISPRO 1 UNITS: 100 INJECTION, SOLUTION INTRAVENOUS; SUBCUTANEOUS at 16:35

## 2024-07-22 RX ADMIN — DAPTOMYCIN 575 MG: 500 INJECTION, POWDER, LYOPHILIZED, FOR SOLUTION INTRAVENOUS at 16:36

## 2024-07-22 RX ADMIN — INSULIN LISPRO 1 UNITS: 100 INJECTION, SOLUTION INTRAVENOUS; SUBCUTANEOUS at 11:39

## 2024-07-22 RX ADMIN — ASPIRIN 81 MG: 81 TABLET, COATED ORAL at 17:12

## 2024-07-22 RX ADMIN — OXYCODONE HYDROCHLORIDE AND ACETAMINOPHEN 500 MG: 500 TABLET ORAL at 17:11

## 2024-07-22 NOTE — ASSESSMENT & PLAN NOTE
Lexapro   Supportive counseling  Counseled on and continue to encourage deep breathing/relaxation/behavioral management techniques

## 2024-07-22 NOTE — PLAN OF CARE
Problem: PAIN - ADULT  Goal: Verbalizes/displays adequate comfort level or baseline comfort level  Description: Interventions:  - Encourage patient to monitor pain and request assistance  - Assess pain using appropriate pain scale  - Administer analgesics based on type and severity of pain and evaluate response  - Implement non-pharmacological measures as appropriate and evaluate response  - Consider cultural and social influences on pain and pain management  - Notify physician/advanced practitioner if interventions unsuccessful or patient reports new pain  Outcome: Progressing     Problem: INFECTION - ADULT  Goal: Absence or prevention of progression during hospitalization  Description: INTERVENTIONS:  - Assess and monitor for signs and symptoms of infection  - Monitor lab/diagnostic results  - Monitor all insertion sites, i.e. indwelling lines, tubes, and drains  - Monitor endotracheal if appropriate and nasal secretions for changes in amount and color  - [place] appropriate cooling/warming therapies per order  - Administer medications as ordered  - Instruct and encourage patient and family to use good hand hygiene technique  - Identify and instruct in appropriate isolation precautions for identified infection/condition  Outcome: Progressing     Problem: SAFETY ADULT  Goal: Patient will remain free of falls  Description: INTERVENTIONS:  - Educate patient/family on patient safety including physical limitations  - Instruct patient to call for assistance with activity   - Consult OT/PT to assist with strengthening/mobility   - Keep Call bell within reach  - Keep bed low and locked with side rails adjusted as appropriate  - Keep care items and personal belongings within reach  - Initiate and maintain comfort rounds  - Make Fall Risk Sign visible to staff  - Offer Toileting every 2 Hours, in advance of need  - Initiate/Maintain bed and chair alarm  - Obtain necessary fall risk management equipment: alarms  - Apply  yellow socks and bracelet for high fall risk patients  - Consider moving patient to room near nurses station  Outcome: Progressing

## 2024-07-22 NOTE — ASSESSMENT & PLAN NOTE
Located in knee - overall well Controlled   Ice PRN   APAP 975mg TID PRN - using this primary  Hydromorphone 2mg Q4H PRN for severe pain.   Monitor for oversedation, AMS/delirium, and respiratory depression   If being administered - hold opiates, muscle relaxants, benzodiazepines, and gabapentin for oversedation, AMS, or RR<12.  Counseled on and continue to encourage deep breathing/relaxation/behavioral pain management techniques

## 2024-07-22 NOTE — PROGRESS NOTES
"Progress Note - Aaron Richards 59 y.o. male MRN: [id number]    Unit/Bed#: [facility]-01 Encounter: [id number]        Subjective:   Patient seen and examined at bedside after reviewing the chart and discussing the case with the caring staff.      Patient examined at bedside.  Patient has no acute issues except patient feels dizzy and lightheaded especially when he stands up.    Patient's labs were reviewed from 7/22/2024.  Patient's CMP showed glucose 104.  Patient's CBC showed hemoglobin 10.1 with hematocrit 31.3.    Physical Exam   Vitals: Blood pressure 108/70, pulse 88, temperature 97.8 °F (36.6 °C), temperature source Temporal, resp. rate 16, height 6' 4\" (1.93 m), weight 111 kg (245 lb 6 oz), SpO2 94%.,Body mass index is 29.87 kg/m².  Constitutional: He appears well-developed.   HEENT: PERR, EOMI, MMM  Cardiovascular: Normal rate and regular rhythm.    Pulmonary/Chest: Effort normal and breath sounds normal.   Abdomen: Soft, + BS, NT    Assessment/Plan:  Aaron Richards is a(n) 59 y.o. male with Septic arthritis s/p R antibiotic cement TKA      Cardiac hx w/ HTN, HLD, PAD/orthostatic hypotension.  Continue ASA 81 mg twice daily (for 4 weeks for ortho DVT ppx, then back to daily), losartan 25 mg daily (home: irbesartan hydrochlorothiazide 150-12.5 mg daily), and atorvastatin 40 mg daily.  Patient will be put on abdominal binders.  I may consider midodrine.  DM2.  Hgb A1c 6.2%.  Continue Lantus 25 units twice daily.  SSI w/ POCT AS HS.  Carb controlled diet.  Home: Lantus 30 units BID and Apidra SSI.   Acquired hypothyroidism. Hx thyroid cancer s/p thyroidectomy.   Continue levothyroxine 175 mcg daily.   GERD.  Continue Protonix 40 mg daily.   CKD3b.  Stable.  Cr 0.71 7/17.  Avoid nephrotoxins.   MDD/anxiety.  Continue Lexapro 20 mg daily.   Hx of alcohol use.  Stable.  Sober since 2023.   Insomnia.  Continue Ambien 10 mg HS as needed.   Pain and bowel regimen.  As per physiatry.   Septic arthritis s/p R " antibiotic cement TKA.  Continue daptomycin 6 mg/kg IV every 24 hours through 8/26.  Check CPK weekly.  Patient receiving intensive PT OT as per physiatry.     Anticipated date of discharge.  TBD.

## 2024-07-22 NOTE — PROGRESS NOTES
07/22/24 0658   Patient Data   Rehab Impairment Impairment of mobility, safety and Activities of Daily Living (ADLs) due to Orthopedic Disorders: 08.61 Unilateral Knee Replacement   Etiologic Diagnosis s/p R antibiotic cement TKA with Dr. 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 Yes   Home Setup   Type of Home Single Level   Method of Entry Stairs   Number of Stairs 4  (3 + 1 (R HR))   Number of Stairs in Home   (FF to basement, but does not use anymore, L HR, intermittent R HR)   First Floor Bathroom Grab Bars;Combo  (higher standard toilet, shower bench)   First Floor Setup Available Yes   Home Modifications Necessary?   (pending progress, pt uses transport chair with LE for propulsion, standward w/c does not fit doorway)   Available Equipment Wheelchair;Roller Walker;Bedside Commode  (1 standard w/c, transort chair,)   Baseline Information   Vocation Other  (retired/disability)   Transportation Family/friends drive  (S.O. drives)   Prior Device(s) Used Wheelchair;Roller Walker   Prior IADL Participation   Money Management Identify Money;Estimate Costs;Estimate Change;Combine Bills;Manage Checkbook   Meal Preparation   (s.o. completes)   Laundry   (s.o. completes)   Home Cleaning   (s.o. completes)   Prior Level of Function   Self-Care 3. Independent - Patient completed the activities by him/herself, with or without an assistive device, with no assistance from a helper.   Indoor-Mobility (Ambulation) 2. Needed Some Help - Patient needed a partial assistance from another person to complete activities.   Stairs 2. Needed Some Help - Patient needed a partial assistance from another person to complete activities.   Functional Cognition 3. Independent - Patient completed the activities by him/herself, with or  without an assistive device, with no assistance from a helper.   Prior Assistance Needed for Driving;Household Chores/Cleaning;Meal Preparation;Shopping   Prior Device Used A. Manual wheelchair   Falls in the Last Year   Number of falls in the past 12 months 0   Patient Preference   Nickname (Patient Preference) Ismael   Psychosocial   Psychosocial (WDL) WDL   Restrictions/Precautions   Precautions Bed/chair alarms;Fall Risk;Limb alert;Pain;Supervision on toilet/commode  (R UE PICC line; h/o TMA R LE)   RLE Weight Bearing Per Order WBAT   ROM Restrictions No   Pain Assessment   Pain Assessment Tool 0-10   Pain Score 8   Pain Location/Orientation Orientation: Right;Location: Knee   Toilet Transfer   Surface Assessed Standard Toilet   Transfer Technique Standard   Limitations Noted In Balance;Confidence;Endurance;UE Strength;LE Strength   Adaptive Equipment Walker;Grab Bar   Positioning Concerns   (R LE TKA)   Findings La RW and grab bar; good eccentric control   Type of Assistance Needed Physical assistance   Physical Assistance Level 25% or less   Toilet Transfer CARE Score 3   Transfer Bed/Chair/Wheelchair   Positioning Concerns   (R LE TKA, h/o TMA)   Limitations Noted In Balance;Endurance;Sensation;UE Strength;LE Strength   Adaptive Equipment Roller Walker   Findings (S)  La RW; uses UE for pull from RW; increased lightheadedness/dizziness upon all standing t/o session, BP dropped to 108/70; symptoms improve with seated rest break   Type of Assistance Needed Physical assistance   Physical Assistance Level 25% or less   Chair/Bed-to-Chair Transfer CARE Score 3   Roll Left and Right   Type of Assistance Needed Supervision   Physical Assistance Level No physical assistance   Comment bed rail, HOB slightly elevated   Roll Left and Right CARE Score 4   Sit to Lying   Type of Assistance Needed Supervision   Physical Assistance Level No physical assistance   Comment bed rail, HOB elevated   Sit to Lying CARE Score 4    Lying to Sitting on Side of Bed   Type of Assistance Needed Supervision   Physical Assistance Level No physical assistance   Comment bed rail, HOB elevated   Lying to Sitting on Side of Bed CARE Score 4   Sit to Stand   Type of Assistance Needed Physical assistance   Physical Assistance Level 25% or less   Comment RW, relies on UE for support   Sit to Stand CARE Score 3   Picking Up Object   Comment due to orthostasis   Reason if not Attempted Safety concerns   Picking Up Object CARE Score 88   Car Transfer   Reason if not Attempted Environmental limitations   Car Transfer CARE Score 10   Ambulation   Primary Mode of Locomotion Prior to Admission Wheelchair   Distance Walked (feet) 10 ft   Assist Device Roller Walker   Gait Pattern Antalgic;Slow Lisa;Decreased foot clearance;Forward Flexion;Narrow YUMIKO;Step to;Step through;Decreased R stance;Improper weight shift   Limitations Noted In Balance;Device Management;Endurance;Heel Strike;Posture;Sensation;Speed;Strength;Swing   Provided Assistance with: Balance   Walk Assist Level Minimum Assist   Findings La RW; antalgic step-to vs step-through pattern   Walk 10 Feet   Type of Assistance Needed Physical assistance   Physical Assistance Level 26%-50%   Comment La RW   Walk 10 Feet CARE Score 3   Walk 50 Feet with Two Turns   Comment due to fatigue/pain/orthostasis   Reason if not Attempted Safety concerns   Walk 50 Feet with Two Turns CARE Score 88   Walk 150 Feet   Reason if not Attempted Safety concerns   Walk 150 Feet CARE Score 88   Walking 10 Feet on Uneven Surfaces   Reason if not Attempted Safety concerns   Walking 10 Feet on Uneven Surfaces CARE Score 88   Wheelchair mobility   Type of Wheelchair Used 1. Manual   Method Right upper extremity;Left upper extremity;Right lower extremity;Left lower extremity   Assistance Provided For Remove Leg Rest;Replace Leg Rest   Distance Level Surface (feet) 150 ft   Findings S intermitten B/L LE use vs B/L UE use  "  Wheel 50 Feet with Two Turns   Type of Assistance Needed Supervision   Physical Assistance Level No physical assistance   Wheel 50 Feet with Two Turns CARE Score 4   Wheel 150 Feet   Type of Assistance Needed Supervision   Physical Assistance Level No physical assistance   Wheel 150 Feet CARE Score 4   Curb or Single Stair   Comment orthostasis concerns   Reason if not Attempted Safety concerns   1 Step (Curb) CARE Score 88   4 Steps   Reason if not Attempted Safety concerns   4 Steps CARE Score 88   12 Steps   Comment Pt has 3 + 1 DANIEL   Reason if not Attempted Activity not applicable   12 Steps CARE Score 9   RLE Assessment   RLE Assessment X   Strength RLE   R Hip Flexion 3/5   R Knee Flexion 3-/5   R Knee Extension 3-/5   R Ankle Dorsiflexion 3/5   R Ankle Plantar Flexion 3+/5   LLE Assessment   LLE Assessment X   Strength LLE   L Hip Flexion 4-/5   L Knee Flexion 4-/5   L Knee Extension 4/5   L Ankle Dorsiflexion 3/5   L Ankle Plantar Flexion 3+/5   RUE Assessment   RUE Assessment WFL   LUE Assessment   LUE Assessment WFL   Coordination   Movements are Fluid and Coordinated 1   Sensation   Light Touch Severe deficits in the RLE;Severe deficits in the LLE  (B/L peripheral neuropathy, decreased sensation from calf-down bilaterally)   Propioception No apparent deficits   Cognition   Overall Cognitive Status WFL   Arousal/Participation Alert;Responsive;Cooperative   Attention Within functional limits   Orientation Level Oriented X4   Memory Within functional limits   Following Commands Follows all commands and directions without difficulty   Vision   Vision Comments Wears glasses   Discharge Information   Vocational Plan Retired/not working   Patient's Discharge Plan Return home   Patient's Rehab Expectations \"To get up those steps and to walk for real\"   Barriers to Discharge Home Decreased Strength;Decreased Endurance;Pain   Impressions Pt is 59 year old male seen for PT evaluation on 07/22/2024 s/p [facility] on 07/21/2024 s/p R antibotic cement TKA.  Orders placed at admission.  Performed at least 2 patient identifiers during session: Name and wristband. Comorbidities affecting pt's physical performance at time of assessment include: diabetes mellitus, chronic kidney disease, traumatic right tibial plateau fracture, h/o TMA R LE. LOF prior to admission was mod I for functional mobility using w/c and RW for transfers only, assist for ambulation with RW with home PT. Personal factors affecting pt at time of IE include: orthostasis, decreased ROM/strength, decreased balance and safety, decreased endurance, decreased upright tolerance, and pain. Please find objective findings from PT assessment regarding body systems outlined above with impairments and limitations including weakness, impaired balance, decreased endurance, pain, decreased activity tolerance and fall risk, as well as mobility assessment.  Pt's clinical presentation warrants participation in multidisciplinary acute rehab program at 3 or greater hours of therapy per day. Pt to benefit from continued PT tx to address deficits as defined above and maximize level of functional independent mobility and consistency.  PT for improved safe return home with maximized functional mobility.   PT Therapy Minutes   PT Time In 0658   PT Time Out 0830   PT Total Time (minutes) 92   PT Mode of treatment - Individual (minutes) 92   PT Mode of treatment - Concurrent (minutes) 0   PT Mode of treatment - Group (minutes) 0   PT Mode of treatment - Co-treat (minutes) 0   PT Mode of Treatment - Total time(minutes) 92 minutes   PT Cumulative Minutes 92   Cumulative Minutes   Cumulative therapy minutes 92     Seated LE TE:  3x10, B/L LEs,  [place]  LAQ  HR  TR  Heel slides with towel    Patient remains supine in bed, all needs in reach.  Alarm in place and activated.  Encouraged use of call bell, patient verbalizes understanding.

## 2024-07-22 NOTE — NURSING NOTE
Patient resting in bed at this time.  No signs of distress noted.  Bed alarm in place to promote patient safety.  Right lower extremity knee dressing CDI. PICC line to the right upper extremity for IV antibiotic infusion.  Patient was continent of bowel and bladder overnight.  Patient was able to reposition frequently to promote skin integrity.  Call bell within reach.  Plan of care ongoing.

## 2024-07-22 NOTE — NURSING NOTE
Patient ambulates with assist x 1 in room with walker. Did have periods of dizziness today when positions moved. Ortho BP's done. Medicated with routine tylenol for pain in RLE. Patient did not wish to take anything else for pain. Picc line flushes well and antibiotics infuse without difficulty. Dressing changed to RLE. No active bleeding noted. Incision clean, dry, and intact. Resting in bed when not in therapy. Able to move self around independently in bed. Will continue to monitor and follow plan of care.

## 2024-07-22 NOTE — ASSESSMENT & PLAN NOTE
Lab Results   Component Value Date    HGBA1C 6.2 (H) 07/01/2024     - Current management by internal medicine  - Monitor for signs and symptoms of hypoglycemia   - Current meds: Lantus, Lispro per IM   - Consistent carb diabetic diet  - Recommend close monitoring and optimal management during recovery/rehab phase as well

## 2024-07-22 NOTE — ASSESSMENT & PLAN NOTE
Hx of peripheral arterial disease, with previous right popliteal artery stent, and subsequent TMA   - Overall management per hospitalist during acute course  - Antithrombotic: currently on aspiring 81mg BID (also for VTE proph) per prior providers   - Statin  - Optimal blood pressure and blood sugar control  (Was recommended asp and plavix when last seen by [facility] 4/2023-unclear why not on this)  - OP Vasc sx follow-up

## 2024-07-22 NOTE — ASSESSMENT & PLAN NOTE
Hb 10.1>9.7  Consult(ed) IM and comanagement with their service during ARC course   Monitor H/H, vitals, signs/symptoms of acute bleeding

## 2024-07-22 NOTE — ASSESSMENT & PLAN NOTE
Prior traumatic HW failure requiring HW removal and then developed sinus tract extending into jt space with op finding c/w septic arthritis per last ID note  - S/p complicated right hand crafted antibiotic cement total knee arthroplasty with augmentation by ortho, Dr Hendrix on 7/15  Weight-bearing as tolerated on affected limb; ROM as tolerated, pillow/blankets under achilles not behind knee while in bed  Comgmt by IM during ARC course   Daptomycin IV through 8/26 for 6 week course - Monitor labs (CBC/BMP and CK weekly) while on Abx; remove PICC after IV abx   7/29 - Leukocytosis 14.2K stable BMP and CK.  Increase in leukocytosis may be related to acute gastroenteritis - will recheck on Wed 7/31/24  Monitor incision/area for infection or dehiscence/impaired healing - decreased serosang drainage, no clear signs of infection again  generalized knee edema stable - pt feels it is improving   Recommend wound care consult - pending   In interim, Change dressing with DSD BID and PRN if soiled to avoid maceration - hopefully can decrease when improves.  Notify MD of increased drainage, pus, redness, or breakdown of incision   Patient was POD 14 on 7/29/24.  Ortho appt with Dr. Hendrix 8/12/24.  Spoke with Dr. Hendrix 7/30/24.  Ok to remove superior and inferior sutures and leave mid sutures in place   Monitor for signs/symptoms of VTE, atelectasis, acute blood loss anemia, or other infection   Function improving   Continue acute comprehensive interdisciplinary inpatient rehabilitation to include intensive skilled therapies (PT, OT) as outlined with oversight and management by rehabilitation physician as well as inpatient rehab level nursing, case management and weekly interdisciplinary team meetings.   Follow-up with Ortho Sx after d/c and ortho if needed during ARC course     7/30/24

## 2024-07-22 NOTE — PROGRESS NOTES
07/22/24 1230   Pain Assessment   Pain Assessment Tool 0-10   Pain Score 8   Pain Location/Orientation Orientation: Right;Location: Knee   Pain Onset/Description Onset: Ongoing   Patient's Stated Pain Goal No pain   Hospital Pain Intervention(s)   (nurse made aware)   Restrictions/Precautions   Precautions Bed/chair alarms;Fall Risk;Limb alert;Pain;Supervision on toilet/commode   RLE Weight Bearing Per Order WBAT   ROM Restrictions No   Sit to Lying   Type of Assistance Needed Supervision   Physical Assistance Level No physical assistance   Sit to Lying CARE Score 4   Lying to Sitting on Side of Bed   Type of Assistance Needed Supervision   Physical Assistance Level No physical assistance   Lying to Sitting on Side of Bed CARE Score 4   Sit to Stand   Type of Assistance Needed Incidental touching   Comment multiple times   Sit to Stand CARE Score 4   Bed-Chair Transfer   Type of Assistance Needed Verbal cues;Incidental touching   Comment SPT   Chair/Bed-to-Chair Transfer CARE Score 4   Transfer Bed/Chair/Wheelchair   Limitations Noted In Balance;Pain Management;LE Strength   Functional Standing Tolerance   Time 2m40s, 3m, 45s   Activity stance at table with B support as well as unilatral support on table top, weight shifting also completed while in stance   Exercise Tools   Exercise Tools Yes   Other Exercise Tool 2 3# dowel x 30 reps in 3 planes of motion   Cognition   Overall Cognitive Status WFL   Arousal/Participation Alert;Cooperative   Attention Within functional limits   Orientation Level Oriented X4   Memory Within functional limits   Following Commands Follows one step commands without difficulty   Additional Activities   Additional Activities Comments w/c mobility on unit MI   Activity Tolerance   Activity Tolerance Patient limited by pain   Assessment   Treatment Assessment BP assessed throughout session while in stance and seated and communicated to nurse. Pt was symptomatic in stance. Seated to begin  session 121/69 then stance 109/60, then seated 114/62 and stance 97/55 next seated 118/71 and stance 108/56. Pt then fatigued and unable to tolerate stance tasks. While in stance pt weight shifting B LEs with fair tolerance reporting pain as a 8/10 in R knee. Educ in benefit of hydration, slow movements, and Dbng throughout session to aide in BP mgmt. Pt completed TE/TA for generalized strength and endurance for functional activity performance. Tolerance was fair as noted in respective sections of note. Pt will benefit from continued OT to futher progress pt toward OTgoals and prepare him for d/c to home with supportive wife.   Prognosis Good   Problem List Decreased strength;Decreased range of motion;Decreased endurance;Impaired balance;Decreased mobility;Decreased safety awareness;Decreased skin integrity;Pain   Plan   Treatment/Interventions Functional transfer training;Therapeutic exercise;Endurance training;Patient/family training;Equipment eval/education;Bed mobility;Compensatory technique education;Spoke to nursing;OT   Progress Progressing toward goals   OT Therapy Minutes   OT Time In 1230   OT Time Out 1332   OT Total Time (minutes) 62   OT Mode of treatment - Individual (minutes) 62   OT Mode of treatment - Concurrent (minutes) 0   OT Mode of treatment - Group (minutes) 0   OT Mode of treatment - Co-treat (minutes) 0   OT Mode of Treatment - Total time(minutes) 62 minutes   OT Cumulative Minutes 62   Therapy Time missed   Time missed? No

## 2024-07-22 NOTE — CASE MANAGEMENT
CM met with patient, explained rehab routine and CM role with introduction. Patient stated he lives with his wife in ranch home with 4 DANIEL. Patient independent PTA, sometimes ambulating with walker, using wheelchair for mobility.  Patient has 2 wheelchairs, RW, crutches, tub bench, grab bars, handheld shower. Patient has no prior outpt therapy experience, does have experience with STR here at [facility], and [facility] with [facility]. Patient stated he would like to go to [facility] outpt therapy at [place] location. CM  explained patient will have homecare due to IV abx until 8/26, can have home therapies, then can transition to outpatient therapies after IV abx course is completed.  Patient has prescription coverage and gets medications from [facility] and [facility] for maintenance medications. CM confirmed patient's address  and insurance with patient. CM explained team meeting process with Medicare guidelines and length of stay.IMM completed and signed and sent to be scanned into patient's chart.

## 2024-07-22 NOTE — PROGRESS NOTES
Physical Medicine and Rehabilitation Progress Note  Aaron Richards 59 y.o. male MRN: [id number]  Unit/Bed#: [facility] 215-01 Encounter: [id number]      Assessment & Plan:     Decline in ADLs and mobility: Functional assessment        FIM  Care Score  Admit Score Recent Score    Total assist  1-100% or 2p    Tot     Max assist 2-51-75%    Sub     Mod assist 3- 26-50%  Par     Min assist 3- 25% or < Par To hygiene, LBD     CG assist 4  TA     Sup/Setup 4-5 Sup Bathing, UBD    Mod-I/Indep 6 MI      Transfers  Min      Ambulation   10 ft mod assist      Stairs   Total assist/NT      Goal: Mod indep for most ADLs and for mobility except possible slight assist for LBD, bathing  Major barriers:  R knee sx, decreased ROM, pain, deconditioning  Dispo: Home with [facility] 12 days from admission      * Septic arthritis of knee (HCC)  Assessment & Plan  Prior traumatic HW failure requiring HW removal and then developed sinus tract extending into jt space with op finding c/w septic arthritis per last ID note  - S/p complicated right hand crafted antibiotic cement total knee arthroplasty with augmentation by ortho, Dr Hendrix on 7/15  Weight-bearing as tolerated on affected limb; ROM as tolerated, pillow/blankets under achilles not behind knee while in bed  Comgmt by IM during ARC course   Daptomycin IV through 8/26 for 6 week course - Monitor labs (CBC/BMP and CK weekly) while on Abx; remove PICC after IV abx   7/22 - Stable mild leukocytosis - 12K, BMP unremarkable, CK wnl  Monitor incision/area for infection or dehiscence/impaired healing - decreased serosang drainage, no clear signs of infection; generalized knee edema stable   Recommend wound care consult  In interim, Change dressing with DSD BID and PRN if soiled to avoid maceration - hopefully can decrease when improves.  Notify MD of increased drainage, pus, redness, or breakdown of incision   POD#14 is Mon 7/29  Monitor for signs/symptoms of VTE, atelectasis, acute blood loss  anemia, or other infection   Continue acute comprehensive interdisciplinary inpatient rehabilitation to include intensive skilled therapies (PT, OT) as outlined with oversight and management by rehabilitation physician as well as inpatient rehab level nursing, case management and weekly interdisciplinary team meetings.   Follow-up with Ortho Sx after d/c and ortho if needed during ARC course       Poor dentition  Assessment & Plan  ID recommends OMFS eval for possible extractions - this was not obtained on acute PTA per prior providers - will recommend OP follow-up unless concerns in interim  - Mgmt per IM here     PAD (peripheral artery disease) (HCC)  Assessment & Plan  Hx of peripheral arterial disease, with previous right popliteal artery stent, and subsequent TMA   - Overall management per hospitalist during acute course  - Antithrombotic: currently on aspiring 81mg BID (also for VTE proph) per prior providers   - Statin  - Optimal blood pressure and blood sugar control  (Was recommended asp and plavix when last seen by [facility] 4/2023-unclear why not on this)  - OP Vas sx follow-up         Hypertension, essential  Assessment & Plan  Also some recent orthostasis with adjustments in regimen PTA - Abdominal binder PRN OOB   Internal medicine consulted and co-management with their service  Monitor vitals with and without activity; monitor for orthostasis  Monitor hemoglobin, electrolytes, kidney function, hydration status   Current meds: Losartan 25mg qday         Leukocytosis  Assessment & Plan  Stable at 12K on 7/22   Comgmt with IM team  - Monitor closely  - On IV abx per ortho  - Monitor exam, labs    Anemia  Assessment & Plan  Consult(ed) IM and comanagement with their service during ARC course   Monitor H/H, vitals, signs/symptoms of acute bleeding      Diabetic neuropathy (HCC)  Assessment & Plan  Optimal DM mgmt  Monitor skin for increased infection/breakdown/wounds which patient is at higher risk  for  Skilled PT/OT   Fall precautions      At risk for venous thromboembolism (VTE)  Assessment & Plan  SCDs, ambulation, and aspirin 81mg BID per ortho       Pain  Assessment & Plan  Controlled   Ice PRN   APAP 975mg TID   Hydromorphone 2mg Q4H PRN (pt felt 4mg was too strong)   Monitor for oversedation, AMS/delirium, and respiratory depression   If being administered - hold opiates, muscle relaxants, benzodiazepines, and gabapentin for oversedation, AMS, or RR<12.  Counseled on and continue to encourage deep breathing/relaxation/behavioral pain management techniques      Depression and anxiety  Assessment & Plan  Lexapro   Supportive counseling  Counseled on and continue to encourage deep breathing/relaxation/behavioral management techniques      Type 2 diabetes mellitus with diabetic peripheral angiopathy without gangrene (HCC)  Assessment & Plan  Lab Results   Component Value Date    HGBA1C 6.2 (H) 07/01/2024     - Current management by internal medicine  - Monitor for signs and symptoms of hypoglycemia   - Current meds: Lantus, Lispro per IM   - Consistent carb diabetic diet  - Recommend close monitoring and optimal management during recovery/rehab phase as well       Hyperlipidemia  Assessment & Plan  Statin     Gastroesophageal reflux disease without esophagitis  Assessment & Plan  PPI        Other Medical Issues:  Monitor for       Restrictions include:  Fall precautions    Objective:    Allergies per EMR  Diagnostic Studies: Reviewed  No orders to display     See above as well    Laboratory: Labs reviewed  Results from last 7 days   Lab Units 07/22/24  0543 07/18/24  0520 07/17/24  0610   HEMOGLOBIN g/dL 10.1* 9.4* 9.7*   HEMATOCRIT % 31.3* 28.7* 28.9*   WBC Thousand/uL 12.98* 12.40* 11.62*     Results from last 7 days   Lab Units 07/22/24  0543 07/17/24  0610   BUN mg/dL 17 22   SODIUM mmol/L 140 136   POTASSIUM mmol/L 4.0 3.6   CHLORIDE mmol/L 106 107   CREATININE mg/dL 0.65 0.71   AST U/L 20  --    ALT  U/L 21  --             Drug regimen reviewed, all potential adverse effects identified and addressed:    Scheduled Meds:  Current Facility-Administered Medications   Medication Dose Route Frequency Provider Last Rate    acetaminophen  975 mg Oral Q8H PRN Meir Prather MD      ascorbic acid  500 mg Oral BID Jessie Bailey PA-C      aspirin  81 mg Oral BID Jessie Bailey PA-C      atorvastatin  40 mg Oral Daily With Dinner Jessie Bailey PA-C      DAPTOmycin  6 mg/kg (Adjusted) Intravenous Q24H Jessie Bailey PA-C 575 mg (07/23/24 1631)    docusate sodium  100 mg Oral BID Jessie Bailey PA-C      escitalopram  5 mg Oral Daily Jessie Bailey PA-C      HYDROmorphone  2 mg Oral Q4H PRN Meir Prather MD      insulin glargine  25 Units Subcutaneous Q12H TESS Jessie Bailey PA-C      insulin lispro  1-6 Units Subcutaneous TID AC Jessie Bailey PA-C      levothyroxine  175 mcg Oral Early Morning Jessie Bailey PA-C      losartan  25 mg Oral Daily Jessie Bailey PA-C      ondansetron  4 mg Intravenous Q6H PRN Jessie Bailey PA-C      pantoprazole  40 mg Oral Every Other Day Jessie Bailey PA-C      senna  1 tablet Oral BID PRN Meir Prather MD      zolpidem  10 mg Oral HS PRN Jessie Bailey PA-C         Chief Complaints:  R knee pain      Subjective:     On eval, patient reports R knee discomfort stable without increased other pain, SOB, fever, chills, sweats.  He note some lightheadedness at times with mobility but not bad.      ROS: A 10 point ROS was performed; negative except as noted above.       Physical Exam:  Temp:  [97.8 °F (36.6 °C)-98.9 °F (37.2 °C)] 97.8 °F (36.6 °C)  HR:  [88-96] 88  Resp:  [16-18] 16  BP: (108-161)/(64-82) 108/70  SpO2:  [93 %-95 %] 94 %  Vitals above reviewed on date of encounter    GEN:  Sitting in NAD   HEENT/NECK: MMM  CARDIAC: Regular rate rhythm, no  murmers, no rubs, no gallops  LUNGS:  clear to auscultation, no wheezes, rales, or rhonchi  ABDOMEN: Soft, non-tender, non-distended, normal active bowel sounds  EXTREMITIES/SKIN:  Stable generalized R knee edema with incision with mod serosang drainage without clear signs of infection, no calf TTP  NEURO:   MENTAL STATUS: awake, oriented to person, place, time, and situation and MENTAL STATUS:  Appropriate wakefulness and interaction   PSYCH:  Affect:  Euthymic     HPI:  59 year old M with PMH of HTN, PAD, R TMA, HLD, DM2, CKD, GERD, diabetic neuropathy who had history of traumatic HW failure requiring removal of HW and later developed sinus track extending into jt space with op finding of septic arthritis who underwent complicated R hand crafted abx cement TKA with augmentation by Dr Hendrix on 7/15. ID consulted and recommended IV Dapto for 6 week course with closely monitoring labs. Post-op course notable for pain, leukocytosis, anemia, and significant decline in ADLs and mobility.     ** Please Note: Fluency Direct voice to text software may have been used in the creation of this document. **    I personally performed the required components and examined the patient myself in person on 7/22/24.

## 2024-07-22 NOTE — TREATMENT PLAN
Individualized Plan of Care - [facility] [place]  Aaron Richards 59 y.o. male MRN: [id number]  Unit/Bed#: [facility] 215-01 Encounter: [id number]     PATIENT INFORMATION  ADMISSION DATE: 7/21/2024  2:15 PM [place] CATEGORY:Orthopedic Disorders:  08.61  Unilateral Knee Replacement   ADMISSION DIAGNOSIS: s/p R antibiotic cement TKA with Dr. 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     Based on my assessment of the patient's medical conditions and current functional status, the prognosis for attaining medical and functional goals or the IRF stay is:  Good.    Patient is appropriate for acute rehabilitation.    Medical Goals:   Patient will be medically stable for discharge back to community setting upon completion or acute rehab program and patient/family will be able to manage medical conditions and comorbid conditions with medications and appropriate follow up upon completion of rehab program.    Cardiopulmonary function/Anemia: Ensure cardiopulmonary stability and optimize cardiopulmonary function not only at rest but with activity as patient's activity level significantly increases in acute rehab compared with prior to transfer in preparation for safe discharge from [facility].  Must closely and frequently monitor blood pressure, HR, anemia to ensure adequate cardiac output during ADLs and ambulation as patient is at increased risk for orthostatic hypotension/syncope and potential injury if not monitored for and managed adequately.    Blood pressure management:    Frequent monitoring of blood pressure with appropriate adjustments in blood pressure medication management to optimize blood pressure control and prevent/limit renal complications.   Monitoring impact of blood pressure and side-effects of blood pressure medications at rest and with  activity.  DM: Patient will improve/maintain blood sugar control to ensure optimal healing and decrease risks of complications associated with DM through medication monitoring, adjustments as indicated, and optimal dietary intake and education.  Pain management:  Pain will improve with frequent evaluation of pain, careful adjustments in medications, frequent re-evaluation of patient's pain and medical/neurologic status to ensure optimal pain control, avoidance of potential serious and even life-threatening side-effects and drug interactions, as well as weaning pain medications as soon as possible to decrease risk of short and long-term use.     Orthopedic Disorder: TKA revision/septic jt causing impaired mobility, ADLs, and gait:  intensive skilled therapies with physical therapy and occupational therapy with close oversight and management by rehab specialized physician in acute rehabilitation setting to most expeditiously and effectively improve functional mobility, transfers, upper and lower body strengthening, conditioning, balance, and gait training with appropriate assistive device.  Patient will have optimal supervision and management of patient's underlying orthopedic disorder with specialized rehabilitation physician during this period of recovery to ensure most expeditious and optimal recovery with decreased risks of fall/injury and other complications including acute worsening of ortho disorder, decrease risk of VTE, PNA, and skin ulceration.  Inpatient rehabilitation education/teaching:  To be provided to patient and typically family/caregiver (if able to be identified) by all skilled therapists, rehab nursing, case management, and rehab specialized physician to ensure optimal recovery and decrease risks of complications in both acute rehabilitation setting as well as after discharge.   Obesity:  Close monitoring of nutrition status, nutrition specialist with adjustments in diet.   on appropriate  short and long term nutrition and activity.  Obesity increases complexity of patient's overall condition and causes unique challenges during this part of patient's recovery process.  Supervise and if necessary make adjustments in rehab nursing care and skilled therapy care to ensure appropriate toileting, bed mobility, other ADLs, and ambulation to decrease risk of falls/injuries, VTE, skin breakdown/ulceration and optimize functional recovery.    Skin management: Increased risk of skin wounds/breakdown/rashes due to recent immobility and co-morbidities.   Appropriate skin checks from nursing and as needed physician.  Frequent turning, application of appropriate barrier creams/dressings, cushions.  Patient/caregiver education.  Optimal bowel/bladder hygiene and mobilization.      ANTICIPATED DISCHARGE DISPOSITION AND SERVICES  COMMUNITY SETTING:    Previous community setting.    ANTICIPATED FOLLOW-UP SERVICE:   Home Health Services: PT, OT RN    DISCIPLINE SPECIFIC PLANS:  Required Disciplines & Services: PT, OT, Rehabilitation Physician, Rehabillitation Nursing, Case Management, Dietary    REQUIRED THERAPY (expected):  Therapy Hours per Day Days per Week Tx Days (Days in ARF)   Physical Therapy 1.5 5 12   Occupational Therapy 1.5 5 12   NOTE: Additional therapy time(s) may be added as appropriate to meet patient needs and to achieve functional goals.    ANTICIPATED FUNCTIONAL OUTCOMES:  ADL: Patient will be largely modified independent with ADLs except possible bathing and LB dressing which may need some assist   Bladder/Bowel: Patient will be modified independent with bladder/bowel management upon completion of rehab program   Transfers: Patient will be modified independent with transfers upon completion of rehab program   Locomotion: Patient will be at or near modified independent with locomotion (wheelchair or ambulation) upon completion of rehab program     DISCHARGE PLANNING NEEDS  Equipment needs: To be  determined at team conference.      REHAB ANTICIPATED PARTICIPATION RESTRICTIONS:  Uncertain at this time.  To be determined closer to discharge.

## 2024-07-22 NOTE — PROGRESS NOTES
OPAT NOTE    Supervising/Discharge physician: Larry     Diagnosis: right TKA infection     Drug: Daptomycin     Dose/Route/Frequency: [id number]    Labs/Frequency: CBCD BMP CK weekly      End Date:8/26    Infusion/VNA contact: Sara BLACK     Next appointment:8/1        MA assigned: malika

## 2024-07-22 NOTE — PCC OCCUPATIONAL THERAPY
7/22/24: Pt evaluated at ARU 7/22/24; LOF listed above. Pt presents motivated and eager to improve during ARU stay. Barriers to d/c include generalized weakness, impaired balance, pain, decreased endurance, and decreased activity tolerance; all affecting self care and fxl transfers. Pt would benefit from continued skilled OT services to improve independence in all daily tasks.     7/31/24: Pt's current LOF listed above. Barriers to d/c include decreased strength throughout but especially RLE s/p TKA, decreased balance, pain in R knee, mildly impaired safety awareness, and decreased activity tolerance; all affect independence in self care and fxl transfers. Pt participating in ADL training, therapeutic exercises and activities, fxl mobility/transfers, and activity tolerance in order to progress towards goals. Pt would benefit from continued skilled OT services in order to address listed barriers and prepare for safe d/c.

## 2024-07-22 NOTE — ASSESSMENT & PLAN NOTE
At home: Losartan 25 mg daily  Here: None - due to orthostasis discontinued on 7/25/24 by internal medicine.  Will need to watch carefully.  Internal medicine consulted and co-management with their service  Monitor vitals with and without activity; monitor for orthostasis  Monitor hemoglobin, electrolytes, kidney function, hydration status

## 2024-07-22 NOTE — ASSESSMENT & PLAN NOTE
7/29 - Leukocytosis 14.2K stable BMP and CK.  Increase in leukocytosis may be related to acute gastroenteritis - will recheck on Wed 7/31/24  Discussed with IM   No increased change in incision appearance or palpation  Incision has been dry without drainage for 24 hours now  ID virtual follow-up 7/30/24

## 2024-07-22 NOTE — PROGRESS NOTES
07/22/24 1000   Patient Data   Rehab Impairment Impairment of mobility, safety and Activities of Daily Living (ADLs) due to Orthopedic Disorders:  08.61  Unilateral Knee Replacement   Etiologic Diagnosis s/p R antibiotic cement TKA with Dr. 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   Date of Onset 07/15/24   Support System   Name Angela Edmondson wife   Home Setup   Type of Home Single Level  (with basement)   Method of Entry Stairs;Hand Rail Right   Number of Stairs 4  (3 the 1)   Number of Stairs in Home   (stairs to the basement, does not go down there)   First Floor Bathroom Full;Grab Bars;Tub;Shower;Combo   First Floor Setup Available Yes   Available Equipment Wheelchair;Handheld Shower;Shower Chair  (transport w/c and standard w/c; has a commode does not use)   Baseline Information   Vocation Other  (retired; fish, visit friends, go to concerts)   Transportation Family/friends drive   Prior Device(s) Used Wheelchair   Prior IADL Participation   Money Management Identify Money;Estimate Costs;Estimate Change;Combine Bills;Manage Checkbook   Meal Preparation   (wife completes)   Laundry   (wife completes)   Home Cleaning   (wife completes)   Prior Level of Function   Self-Care 3. Independent - Patient completed the activities by him/herself, with or without an assistive device, with no assistance from a helper.   Functional Cognition 3. Independent - Patient completed the activities by him/herself, with or without an assistive device, with no assistance from a helper.   Falls in the Last Year   Number of falls in the past 12 months 0   Patient Preference   Nickname (Patient Preference) Ismael   Patient Normally Wakes at 0900   Psychosocial   Psychosocial (WDL) WDL   Restrictions/Precautions   Precautions Bed/chair alarms;Fall Risk;Limb alert;Pain;Supervision on toilet/commode  (RUE PICC line)   RLE Weight Bearing Per Order WBAT   ROM Restrictions No   Pain  "Assessment   Pain Assessment Tool 0-10   Pain Score 8   Pain Location/Orientation Orientation: Right;Location: Leg   Eating Assessment   Dentures Other  (\"not yet\")   Oral Hygiene   Type of Assistance Needed Set-up / clean-up   Comment seated at sink   Oral Hygiene CARE Score 5   Grooming   Able To Initiate Tasks;Wash/Dry Face;Brush/Clean Teeth;Wash/Dry Hands   Limitation Noted In Safety;Strength   Tub/Shower Transfer   Reason Not Assessed Sponge Bath   Shower/Bathe Self   Type of Assistance Needed Supervision   Comment CHG wipes used seated at sink   Shower/Bathe Self CARE Score 4   Bathing   Assessed Bath Style Sponge Bath   Anticipated D/C Bath Style Sponge Bath;Shower   Able to Gather/Transport No   Able to Wash/Rinse/Dry (body part) Left Arm;Right Arm;L Upper Leg;R Upper Leg;L Lower Leg/Foot;Chest;Abdomen;Perineal Area;Buttocks  (weight shifted to clean buttocks; denied washing feet)   Limitations Noted in Balance;Endurance;ROM;Safety;Strength   Positioning Seated   Dressing/Undressing Clothing   Remove UB Clothes Pullover Shirt   Don UB Clothes Pullover Shirt   Type of Assistance Needed Supervision   Upper Body Dressing CARE Score 4   Reason if not Attempted   (pt requested to keep same LB clothes on)   Limitations Noted In Balance;Endurance;Safety;Strength;ROM   Positioning Supported Sit   Putting On/Taking Off Footwear   Type of Assistance Needed Supervision   Comment seated EOB   Putting On/Taking Off Footwear CARE Score 4   Toileting Hygiene   Type of Assistance Needed Supervision   Toileting Hygiene CARE Score 4   Toilet Transfer   Surface Assessed Raised Toilet   Transfer Technique Standard   Limitations Noted In Balance;Endurance;ROM;Safety;UE Strength;LE Strength   Adaptive Equipment Walker;Grab Bar   Type of Assistance Needed Incidental touching   Toilet Transfer CARE Score 4   Toileting   Able to Pull Clothing down yes, up yes.   Manage Hygiene Bowel;Bladder   Limitations Noted In ROM;Safety;UE " "Strength;LE Strength   Transfer Bed/Chair/Wheelchair   Limitations Noted In Balance;Endurance;UE Strength;LE Strength   Adaptive Equipment Roller Walker   Type of Assistance Needed Incidental touching   Chair/Bed-to-Chair Transfer CARE Score 4   Sit to Lying   Type of Assistance Needed Supervision   Sit to Lying CARE Score 4   Lying to Sitting on Side of Bed   Type of Assistance Needed Supervision   Lying to Sitting on Side of Bed CARE Score 4   Sit to Stand   Type of Assistance Needed Incidental touching   Sit to Stand CARE Score 4   Picking Up Object   Reason if not Attempted Safety concerns   Picking Up Object CARE Score 88   Comprehension   Comprehension (FIM) 5 - Understands basic directions and conversation   Expression   Expression (FIM) 5 - Needs help/cues only RARELY (< 10% of the time)   Social Interaction   Social Interaction (FIM) 5 - Requires redirection but less than 10% of the time.   Problem Solving   Problem solving (FIM) 5 - Solves basic problems 90% of time   Memory   Memory (FIM) 5 - Recalls/performs request 90% of time   RUE Assessment   RUE Assessment WFL   LUE Assessment   LUE Assessment WFL   Coordination   Movements are Fluid and Coordinated 1   Sensation   Propioception No apparent deficits   Cognition   Overall Cognitive Status WFL   Arousal/Participation Alert;Responsive;Cooperative   Attention Within functional limits   Orientation Level Oriented X4   Memory Within functional limits   Following Commands Follows one step commands without difficulty   Vision   Vision Comments glasses   Therapeutic Exercise   Therapeutic Exercise/Activity 2lb weight for 2 sets of 15 in BUE in elbow flexion/extension; shoulder protraction/retraction, elevation/depression; forearm pronation/supination; all to improve BUE strength/ROM   Discharge Information   Patient's Rehab Expectations \"to be able to do the things I did before\"   Impressions Pt recieved from hospital s/p R antibiotic cement TKA with  " [facility] on 7/15/24 for 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 Pt completed ADL training and fxl mobility with no symptoms of orthostatic hypotension. Barriers to d/c include generalized weakness, impaired balance, pain, decreased endurance, and decreased activity tolerance; all affecting self care and fxl transfers. Pt would benefit from continued skilled OT services to improve independence in all daily tasks.   OT Therapy Minutes   OT Time In 1000   OT Time Out 1100   OT Total Time (minutes) 60   OT Mode of treatment - Individual (minutes) 60

## 2024-07-22 NOTE — CONSULTS
PHYSICAL MEDICINE AND CONSULTATION NOTE  Aaron Richards 59 y.o. male MRN: [id number]  Unit/Bed#: [facility] 215-01 Encounter: [id number]     Rehab Diagnosis: Orthopedic Disorders:  08.61  Unilateral Knee Replacement    Etiologic Diagnosis:   s/p R antibiotic cement TKA with Dr. 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  ID consulted and recommended IV Dapto for 6 week course with closely monitoring labs.  Post-op course notable for pain, leukocytosis, anemia, and significant decline in ADLs and mobility.       Chief complaint:  R knee pain     Subjective:  On eval, patient reports R knee pain at times but not worsening and controlled by hydromorphone 2mg PRN and APAP; he felt higher dose HM was too strong.  He denies significant other pain.  He reports occasional lightheadedness when changing positions.  Patient denies SOB, fever, chills, calf pain.  He reports significant swelling around the knee since surgery with some drainage and slight bleeding which he states is not worsening.  He states he was told by ortho some drainage along the incision was ok so not to allow too much internal build up.       Review of Systems: A 10 point ROS was performed; negative except as noted above.     * Septic arthritis of knee (HCC)  Assessment & Plan  Prior traumatic HW failure requiring HW removal and then developed sinus tract extending into jt space with op finding c/w septic arthritis per last ID note  - S/p complicated right hand crafted antibiotic cement total knee arthroplasty with augmentation by ortho, Dr Hendrix on  7/15  Weight-bearing as tolerated on affected limb; ROM as tolerated, pillow/blankets under achilles not behind knee while in bed  Comgmt by IM during ARC course   Daptomycin IV through 8/26 for 6 week course - Monitor labs (CBC/BMP) while on Abx; remove PICC after IV abx   Monitor incision/area for infection or dehiscence/impaired healing - fair amount of serosang drainage POA  Recommend wound care consult  In interim, Change dressing with DSD BID and PRN if soiled to avoid maceration - hopefully can decrease when improves.  Notify MD of increased drainage, pus, redness, or breakdown of incision   POD#14 is Mon 7/29  Monitor for signs/symptoms of VTE, atelectasis, acute blood loss anemia, or other infection   Recommend acute comprehensive interdisciplinary inpatient rehabilitation to include intensive skilled therapies (PT, OT) as outlined with oversight and management by rehabilitation physician as well as inpatient rehab level nursing, case management and weekly interdisciplinary team meetings.   Follow-up with Ortho Sx after d/c and ortho if needed during ARC course       Poor dentition  Assessment & Plan  ID recommends OMFS eval for possible extractions - this was not obtained on acute PTA per prior providers - will recommend OP follow-up unless concerns in interim  - Mgmt per IM here     PAD (peripheral artery disease) (HCC)  Assessment & Plan  Hx of peripheral arterial disease, with previous right popliteal artery stent, and subsequent TMA   - Overall management per hospitalist during acute course  - Antithrombotic: currently on aspiring 81mg BID (also for VTE proph) per prior providers   - Statin  - Optimal blood pressure and blood sugar control  (Was recommended asp and plavix when last seen by [facility] 4/2023-unclear why not on this)  - OP Vas sx follow-up         Hypertension, essential  Assessment & Plan  Also some recent orthostasis with adjustments in regimen PTA  Internal medicine consulted and  co-management with their service  Monitor vitals with and without activity; monitor for orthostasis  Monitor hemoglobin, electrolytes, kidney function, hydration status   Current meds: Losartan 25mg qday         Leukocytosis  Assessment & Plan  Comgmt with IM team  - Monitor closely  - On IV abx per ortho  - Monitor exam, labs    Anemia  Assessment & Plan  Consult(ed) IM and comanagement with their service during ARC course   Monitor H/H, vitals, signs/symptoms of acute bleeding      Diabetic neuropathy (HCC)  Assessment & Plan  Optimal DM mgmt  Monitor skin for increased infection/breakdown/wounds which patient is at higher risk for  Skilled PT/OT   Fall precautions      At risk for venous thromboembolism (VTE)  Assessment & Plan  SCDs, ambulation, and aspirin 81mg BID per ortho       Pain  Assessment & Plan  APAP 975mg TID   Hydromorphone 2mg Q4H PRN (pt felt 4mg was too strong)   Monitor for oversedation, AMS/delirium, and respiratory depression   If being administered - hold opiates, muscle relaxants, benzodiazepines, and gabapentin for oversedation, AMS, or RR<12.  Counseled on and continue to encourage deep breathing/relaxation/behavioral pain management techniques      Depression and anxiety  Assessment & Plan  Lexapro   Supportive counseling  Counseled on and continue to encourage deep breathing/relaxation/behavioral management techniques      Type 2 diabetes mellitus with diabetic peripheral angiopathy without gangrene (HCC)  Assessment & Plan  Lab Results   Component Value Date    HGBA1C 6.2 (H) 07/01/2024     - Current management by internal medicine  - Monitor for signs and symptoms of hypoglycemia   - Current meds: Lantus, Lispro per IM   - Consistent carb diabetic diet  - Recommend close monitoring and optimal management during recovery/rehab phase as well       Hyperlipidemia  Assessment & Plan  Statin     Gastroesophageal reflux disease without esophagitis  Assessment & Plan  PPI        Restrictions  "include:  Fall precautions and see above     ---------------------------------------------------------------------------------------------------------------------      OBJECTIVE:    Physical Exam:  07/21/24 1426 98.8 °F (37.1 °C) 96 18 161/82 -- 95 % None (Room air) Lying   Vitals above reviewed on date of encounter    General: Awake, alert in NAD  HENT: NCAT, MMM  Neck: Supple, no lymphadenopathy  Respiratory: Unlabored breathing, breath sounds equal, Lungs CTA, no wheezes, rales, or rhonchi  Cardiovascular: Regular rate and rhythm, no murmurs, rubs, or gallops  Gastrointestinal: Soft, non-tender, non-distended, normoactive bowel sounds  Genitourinary: No casey  SkiN/MSK/Extremities:  R knee region with fairly significant generalized edema included incision line with serosang drainage in a few areas along incision without purulence; subtle increased warmth without significant tenderness; no calf TTP; s/p R TMA  Neurologic/Psych:   MENTAL STATUS: awake, oriented to person, place, time, and situation  Affect: Euthymic   CN II-XII: grossly intact   Strength/MMT:  Near full expect not testing fully RLE proximally     Laboratory:    Results from last 7 days   Lab Units 07/18/24  0520 07/17/24  0610   HEMOGLOBIN g/dL 9.4* 9.7*   HEMATOCRIT % 28.7* 28.9*   WBC Thousand/uL 12.40* 11.62*     Results from last 7 days   Lab Units 07/17/24  0610 07/16/24  0426   BUN mg/dL 22 21   POTASSIUM mmol/L 3.6 3.7   CHLORIDE mmol/L 107 105   CREATININE mg/dL 0.71 0.78   AST U/L  --   --    ALT U/L  --   --             Wt Readings from Last 1 Encounters:   07/21/24 111 kg (245 lb 6 oz)     Estimated body mass index is 29.87 kg/m² as calculated from the following:    Height as of this encounter: 6' 4\" (1.93 m).    Weight as of this encounter: 111 kg (245 lb 6 oz).    Imaging: reviewed    Per EMR:  Past Medical History:   Past Surgical History:   Family History:   Social history:   Past Medical History:   Diagnosis Date    Broken internal " right knee prosthesis (HCC) 01/2023    Chronic kidney disease     Colon polyp     Constipation 03/09/2023    Diabetes mellitus (HCC)     Disease of thyroid gland     GERD (gastroesophageal reflux disease)     HHS vs DKA 11/16/2018    Hyperlipidemia     Hypertension     Thyroid cancer (HCC)     Past Surgical History:   Procedure Laterality Date    COLONOSCOPY      FOOT AMPUTATION Right     FOOT SURGERY Right 2014    IR AORTAGRAM WITH RUN-OFF  08/06/2020    IR TUNNELED CENTRAL LINE PLACEMENT  09/08/2020    IR TUNNELED CENTRAL LINE REMOVAL  09/28/2020    SC AMPUTATION FOOT TRANSMETARSAL Right 09/03/2020    Procedure: AMPUTATION TRANSMETATARSAL (TMA);  Surgeon: Tracy Murillo DPM;  Location: [facility];  Service: Podiatry    SC ARTHRP KNE CONDYLE&PLATU MEDIAL&LAT COMPARTMENTS Right 7/15/2024    Procedure: ARTHROPLASTY KNEE TOTAL;  Surgeon: Jorge Hendrix DO;  Location: [facility];  Service: Orthopedics    SC OPEN TX TIBIAL FRACTURE PROXIMAL UNICONDYLAR Right 5/1/2023    Procedure: OPEN REDUCTION W/ INTERNAL FIXATION (ORIF) RIGHT TIBIAL PLATEAU NONUNION;  Surgeon: Sesar Hilario MD;  Location: [facility];  Service: Orthopedics    SC REMOVAL IMPLANT DEEP Right 1/22/2024    Procedure: REMOVAL HARDWARE TIBIA;  Surgeon: Sesar Hilario MD;  Location: [facility];  Service: Orthopedics    SC THYROIDECTOMY TOTAL/COMPLETE N/A 02/03/2021    Procedure: TOTAL THYROIDECTOMY WITH NIMS MONITORING;  Surgeon: Sesar Garvey MD;  Location: [facility];  Service: ENT    TOE AMPUTATION Right 08/11/2020    Procedure: AMPUTATION TOE;  Surgeon: Tracy Murillo DPM;  Location: [facility];  Service: Podiatry    US GUIDED THYROID BIOPSY  07/02/2020     Family History   Problem Relation Age of Onset    Cancer Mother     Hypertension Father       Social History     Socioeconomic History    Marital status: Single     Spouse name: None    Number of children: None    Years of education: None    Highest education level: None   Occupational  History    None   Tobacco Use    Smoking status: Never    Smokeless tobacco: Never   Vaping Use    Vaping status: Never Used   Substance and Sexual Activity    Alcohol use: Not Currently     Comment: quit 1-2023    Drug use: Not Currently     Types: Marijuana    Sexual activity: Not Currently     Partners: Female   Other Topics Concern    None   Social History Narrative    Light caffeine     Wears seatbelt daily    Lives with girlfriend     Social Determinants of Health     Financial Resource Strain: Low Risk  (9/18/2023)    Overall Financial Resource Strain (CARDIA)     Difficulty of Paying Living Expenses: Not hard at all   Food Insecurity: No Food Insecurity (7/21/2024)    Hunger Vital Sign     Worried About Running Out of Food in the Last Year: Never true     Ran Out of Food in the Last Year: Never true   Transportation Needs: No Transportation Needs (7/21/2024)    PRAPARE - Transportation     Lack of Transportation (Medical): No     Lack of Transportation (Non-Medical): No   Physical Activity: Not on file   Stress: Not on file (2/9/2021)   Social Connections: Not on file   Intimate Partner Violence: Not on file (2/9/2021)   Housing Stability: Low Risk  (7/21/2024)    Housing Stability Vital Sign     Unable to Pay for Housing in the Last Year: No     Number of Times Moved in the Last Year: 0     Homeless in the Last Year: No          Current Medical Diagnosis Medications Allergies   Patient Active Problem List   Diagnosis    Hypertension, essential    Gastroesophageal reflux disease without esophagitis    Hyperlipidemia    History of nonadherence to medical treatment    Right-sided tinnitus    Colon polyps    Elevated liver enzymes    PAD (peripheral artery disease) (HCC)    Thyroid cancer (HCC)    Urinary retention    Abnormal EKG    Insomnia    Persistent proteinuria    Acquired absence of right foot (HCC)    Acquired hypothyroidism    Type 2 diabetes mellitus with diabetic peripheral angiopathy without  gangrene (HCC)    Dupuytren's contracture of right hand    Stage 3b chronic kidney disease ([facility])    Hyperkalemia    Alcoholism ([facility])    Closed fracture of right tibial plateau    MARKELL (acute kidney injury) ([facility])    Chronic kidney disease-mineral and bone disorder    Acquired genu varum of right lower extremity    Depression and anxiety    Constipation    MANNY (obstructive sleep apnea)    S/P hardware removal    Septic arthritis of knee ([facility])    Chronic pain of right knee    Type 2 diabetes mellitus without complication, with long-term current use of insulin ([facility])    Poor dentition    Pain    At risk for venous thromboembolism (VTE)    Diabetic neuropathy ([facility])    Anemia    Leukocytosis      Current Facility-Administered Medications:     acetaminophen (TYLENOL) tablet 975 mg, 975 mg, Oral, Q8H, Jessie Bailey PA-C, 975 mg at 07/22/24 0607    ascorbic acid (VITAMIN C) tablet 500 mg, 500 mg, Oral, BID, Jessie Bailey PA-C, 500 mg at 07/22/24 0918    aspirin (ECOTRIN LOW STRENGTH) EC tablet 81 mg, 81 mg, Oral, BID, Jessie Bailey PA-C, 81 mg at 07/22/24 0918    atorvastatin (LIPITOR) tablet 40 mg, 40 mg, Oral, Daily With Dinner, Jessie Bailey PA-C, 40 mg at 07/21/24 1552    DAPTOmycin (CUBICIN) 575 mg in sodium chloride 0.9 % 50 mL IVPB, 6 mg/kg (Adjusted), Intravenous, Q24H, Jessie Bailey PA-C, Stopped at 07/21/24 1717    docusate sodium (COLACE) capsule 100 mg, 100 mg, Oral, BID, Jessie Bailey PA-C, 100 mg at 07/21/24 1700    escitalopram (LEXAPRO) tablet 5 mg, 5 mg, Oral, Daily, Jessie Bailey PA-C, 5 mg at 07/22/24 0918    HYDROmorphone (DILAUDID) tablet 2 mg, 2 mg, Oral, Q4H PRN, Meir Prather MD    insulin glargine (LANTUS) subcutaneous injection 25 Units 0.25 mL, 25 Units, Subcutaneous, Q12H TESS, Jessie Bailey PA-C, 25 Units at 07/22/24 0918    insulin lispro (HumALOG/ADMELOG) 100 units/mL subcutaneous injection 1-6 Units, 1-6 Units,  Subcutaneous, TID AC, 2 Units at 07/21/24 1700 **AND** Fingerstick Glucose (POCT), , , TID AC, Jessie Bailey PA-C    levothyroxine tablet 175 mcg, 175 mcg, Oral, Early Morning, Jessie Bailey PA-C, 175 mcg at 07/22/24 0537    losartan (COZAAR) tablet 25 mg, 25 mg, Oral, Daily, Jessie Bailey PA-C, 25 mg at 07/22/24 0918    ondansetron (ZOFRAN) injection 4 mg, 4 mg, Intravenous, Q6H PRN, Jessie Bailey PA-C    [START ON 7/23/2024] pantoprazole (PROTONIX) EC tablet 40 mg, 40 mg, Oral, Every Other Day, Jessie Bailey PA-C    senna (SENOKOT) tablet 8.6 mg, 1 tablet, Oral, BID PRN, Meir Prather MD    zolpidem (AMBIEN) tablet 10 mg, 10 mg, Oral, HS PRN, Jessie Bailey PA-C Allergies   Allergen Reactions    Vancomycin Other (See Comments)     Kidney issues    Pollen Extract Eye Swelling     Eyes get watery             Prior Level of Function and social history:    He lives in a(n) single family home  Aaron Richards is  and lives with their spouse.  Self Care: Independent, Indoor Mobility: Independent with W/C, Stairs (in/outdoor): Needed some help, and Cognition: Independent     FALLS IN THE LAST 6 MONTHS: 0     HOME ENVIRONMENT:  The living area: can live on one level  There are 3 steps to enter the home.    The patient will have 24 hour supervision/physical assistance available upon discharge.     Current Level of Function:    Mobility:  Transfers min assist   Ambulation/Mobility 10-25 ft min assist     ADLs:  [facility] mod assist bathing, toileting hygiene, toilet transfers     Potential Barriers to Discharge:  Co-morbidities (see above), new functional deficits, RLE sx, decreased ROM, pain, deconditioning, risk of complications     Tolerance for three hours of therapy per therapy day: adequate     Rehabilitation Prognosis: good       Rehabilitation Plan/Therapy Interventions: This patient will have close medical and rehabilitation oversight from a  physical medicine and rehabilitation physician and if needed an internal medicine physician to manage the complexity of medical issues to optimize health, ability to participate in therapy, quality of life, and functional outcomes. This patient requires 24 hour rehabilitation nursing to address bowel and bladder management, (pain management if listed below), medication administration, positioning/skin monitoring, fall/injury prevention, and VTE prophylaxis.      Physical and occupational therapy: Patient requires PT, OT to improve functional mobility, transfers, upper and lower body strengthening, conditioning, balance, and gait training with appropriate assistive device. PT and OT will be provided approximately 5 times per week for 90 minutes per day.   Skilled therapists and nursing will also provide patient/family safety education and training.  / will work to ensure proper communication between patient (+/- family) and staff regarding the overall rehabilitation and medical process while patient is in the acute rehabilitation center.    / will work with patient (and if applicable family and community resources) to optimize safe discharge.    Discharge Planning:    Estimated length of stay:   12 days.    Family/Patient Goals:  Patient/family's goals: Return to previous home/apartment.    Mobility Goals:   Largely mod indep     Activities of Daily Living (ADLs) Goals:  Largely mod indep with possible slight assist for LBD/bathing      Rehabilitation and discharge goals discussed with the patient and/or family.    Case Managment and Social Work to review patient/family resources and to coordinate Discharge Planning.    Patient and Family Education and Training:  Rehabilitation and discharge goals discussed with the patient and/or family.  Patient/family education/training needs to be discussed in weekly team meeting.    Other equipment: To be determined    Medical  "Necessity Criteria for ARC Admission:  The preadmission screen, post-admission physical evaluation, overall plan of care and admissions order demonstrate a reasonable expectation that the following criteria were met at the time of admission to the [facility].  (See \"Specific areas of management and oversight in ARC setting\" for additional details on medical necessity as outlined below).  The patient requires active and ongoing therapeutic intervention of multiple therapy disciplines (physical therapy, occupational therapy, speech-language pathology, or prosthetics/orthotics), one of which is physical or occupational therapy.    Patient requires an intensive rehabilitation therapy program, as defined in Chapter 1, section 110.2.2 of the CMS Medicare Policy Manual. This intensive rehabilitation therapy program will consist of at least 3 hours of therapy per day at least 5 days per week or at least 15 hours of intensive rehabilitation therapy within a 7 consecutive day period, beginning with the date of admission to the [facility].    The patient is reasonably expected to actively participate in, and benefit significantly from, the intensive rehabilitation therapy program as defined in Chapter 1, section 110.2.2 of the CMS Medicare Policy Manual at this time of admission to the [facility]. He can reasonably be expected to make measurable improvement (that will be of practical value to improve the patient’s functional capacity or adaptation to impairments) as a result of the rehabilitation treatment, as defined in section 110.3, and such improvement can be expected to be made within the prescribed period of time. As noted in the CMS Medicare Policy Manual, the patient need not be expected to achieve complete independence in the domain of self-care nor be expected to return to his or her prior level of functioning in order to meet this standard.  The patient must require physician supervision by a rehabilitation physician. As such, a " rehabilitation physician will conduct face-to-face visits with the patient at least 3 days per week throughout the patient’s stay in the [facility] to assess the patient both medically and functionally, as well as to modify the course of treatment as needed to maximize the patient’s capacity to benefit from the rehabilitation process.  The patient requires an intensive and coordinated interdisciplinary approach to providing rehabilitation, as defined in Chapter 1, section 110.2.5 of the CMS Medicare Policy Manual. This will be achieved through periodic team conferences, conducted at least once in a 7-day period, and comprising of an interdisciplinary team of medical professionals consisting of: a rehabilitation physician, registered nurse,  and/or , and a licensed/certified therapist from each therapy discipline involved in treating the patient.     Changes Since Pre-admission Assessment: None -This patient's participation in rehab continues to be reasonable, necessary and appropriate.    CMS Required Post-Admission Physician Evaluation Elements  History and Physical, including medical history, functional history and active comorbidities as in above text.     Post-Admission Physician Evaluation:  The patient has the potential to make improvement and is in need of physical, occupational, and/or therapy services. The patient may also need nutritional services. Given the patient's complex medical condition and risk of further medical complications, rehabilitative services cannot be safely provided at a lower level of care, such as a skilled nursing facility. I have reviewed the patient's functional and medical status at the time of the preadmission screening and they are the same as on the day of this admission. I acknowledge that I have personally performed a full physical examination on this patient within 24 hours of admission. The patient demonstrated understanding the rehabilitation program and  the discharge process after we discussed them.     Agree in entirety: yes  Minor adaptions: none    Major changes: none    Specific areas of management and oversight in ARC setting:    Cardiopulmonary function/Anemia: Ensure cardiopulmonary stability and optimize cardiopulmonary function not only at rest but with activity as patient's activity level significantly increases in acute rehab compared with prior to transfer in preparation for safe discharge from [facility].  Must closely and frequently monitor blood pressure, HR, anemia to ensure adequate cardiac output during ADLs and ambulation as patient is at increased risk for orthostatic hypotension/syncope and potential injury if not monitored for and managed adequately.    Blood pressure management:    Frequent monitoring of blood pressure with appropriate adjustments in blood pressure medication management to optimize blood pressure control and prevent/limit renal complications.   Monitoring impact of blood pressure and side-effects of blood pressure medications at rest and with activity.  DM: Patient will improve/maintain blood sugar control to ensure optimal healing and decrease risks of complications associated with DM through medication monitoring, adjustments as indicated, and optimal dietary intake and education.  Pain management:  Pain will improve with frequent evaluation of pain, careful adjustments in medications, frequent re-evaluation of patient's pain and medical/neurologic status to ensure optimal pain control, avoidance of potential serious and even life-threatening side-effects and drug interactions, as well as weaning pain medications as soon as possible to decrease risk of short and long-term use.     Orthopedic Disorder: TKA revision/septic jt causing impaired mobility, ADLs, and gait:  intensive skilled therapies with physical therapy and occupational therapy with close oversight and management by rehab specialized physician in acute rehabilitation  setting to most expeditiously and effectively improve functional mobility, transfers, upper and lower body strengthening, conditioning, balance, and gait training with appropriate assistive device.  Patient will have optimal supervision and management of patient's underlying orthopedic disorder with specialized rehabilitation physician during this period of recovery to ensure most expeditious and optimal recovery with decreased risks of fall/injury and other complications including acute worsening of ortho disorder, decrease risk of VTE, PNA, and skin ulceration.  Inpatient rehabilitation education/teaching:  To be provided to patient and typically family/caregiver (if able to be identified) by all skilled therapists, rehab nursing, case management, and rehab specialized physician to ensure optimal recovery and decrease risks of complications in both acute rehabilitation setting as well as after discharge.   Obesity:  Close monitoring of nutrition status, nutrition specialist with adjustments in diet.   on appropriate short and long term nutrition and activity.  Obesity increases complexity of patient's overall condition and causes unique challenges during this part of patient's recovery process.  Supervise and if necessary make adjustments in rehab nursing care and skilled therapy care to ensure appropriate toileting, bed mobility, other ADLs, and ambulation to decrease risk of falls/injuries, VTE, skin breakdown/ulceration and optimize functional recovery.    Skin management: Increased risk of skin wounds/breakdown/rashes due to recent immobility and co-morbidities.   Appropriate skin checks from nursing and as needed physician.  Frequent turning, application of appropriate barrier creams/dressings, cushions.  Patient/caregiver education.  Optimal bowel/bladder hygiene and mobilization.      ** Please Note: Fluency Direct voice to text software may have been used in the creation of this document. **    I  personally performed the required components and examined the patient myself in person on 7/22/24.    Mier Prather MD, MS  [facility]  Physical Medicine and Rehabilitation  Brain Injury Medicine

## 2024-07-22 NOTE — ASSESSMENT & PLAN NOTE
Optimal DM mgmt  Monitor skin for increased infection/breakdown/wounds which patient is at higher risk for  Skilled PT/OT   Fall precautions

## 2024-07-22 NOTE — TELEPHONE ENCOUNTER
Called [facility] and spoke with Magaly, nurse today.   Magaly confirms received via fax at facility today.   Went over antibiotic plan, weekly labs and follow up appointment.   Informed Magaly if there are any further questions or concerns to call the office   Magaly verbalizes understanding at this time.

## 2024-07-22 NOTE — ASSESSMENT & PLAN NOTE
ID recommends OMFS eval for possible extractions - this was not obtained on acute PTA per prior providers - will recommend OP follow-up unless concerns in interim - discussed with pt   - Mgmt per IM here

## 2024-07-23 LAB
GLUCOSE SERPL-MCNC: 109 MG/DL (ref 65–140)
GLUCOSE SERPL-MCNC: 166 MG/DL (ref 65–140)
GLUCOSE SERPL-MCNC: 168 MG/DL (ref 65–140)
GLUCOSE SERPL-MCNC: 177 MG/DL (ref 65–140)

## 2024-07-23 PROCEDURE — 99232 SBSQ HOSP IP/OBS MODERATE 35: CPT

## 2024-07-23 PROCEDURE — 97530 THERAPEUTIC ACTIVITIES: CPT

## 2024-07-23 PROCEDURE — 97535 SELF CARE MNGMENT TRAINING: CPT

## 2024-07-23 PROCEDURE — 97110 THERAPEUTIC EXERCISES: CPT

## 2024-07-23 PROCEDURE — 97116 GAIT TRAINING THERAPY: CPT

## 2024-07-23 PROCEDURE — 97542 WHEELCHAIR MNGMENT TRAINING: CPT

## 2024-07-23 PROCEDURE — 82948 REAGENT STRIP/BLOOD GLUCOSE: CPT

## 2024-07-23 RX ORDER — ACETAMINOPHEN 325 MG/1
975 TABLET ORAL EVERY 8 HOURS PRN
Status: DISCONTINUED | OUTPATIENT
Start: 2024-07-23 | End: 2024-07-29

## 2024-07-23 RX ADMIN — OXYCODONE HYDROCHLORIDE AND ACETAMINOPHEN 500 MG: 500 TABLET ORAL at 08:52

## 2024-07-23 RX ADMIN — ESCITALOPRAM OXALATE 5 MG: 5 TABLET, FILM COATED ORAL at 08:52

## 2024-07-23 RX ADMIN — ACETAMINOPHEN 975 MG: 325 TABLET ORAL at 20:19

## 2024-07-23 RX ADMIN — OXYCODONE HYDROCHLORIDE AND ACETAMINOPHEN 500 MG: 500 TABLET ORAL at 17:09

## 2024-07-23 RX ADMIN — ASPIRIN 81 MG: 81 TABLET, COATED ORAL at 08:52

## 2024-07-23 RX ADMIN — LOSARTAN POTASSIUM 25 MG: 25 TABLET, FILM COATED ORAL at 08:52

## 2024-07-23 RX ADMIN — INSULIN LISPRO 1 UNITS: 100 INJECTION, SOLUTION INTRAVENOUS; SUBCUTANEOUS at 12:09

## 2024-07-23 RX ADMIN — INSULIN LISPRO 1 UNITS: 100 INJECTION, SOLUTION INTRAVENOUS; SUBCUTANEOUS at 16:30

## 2024-07-23 RX ADMIN — DAPTOMYCIN 575 MG: 500 INJECTION, POWDER, LYOPHILIZED, FOR SOLUTION INTRAVENOUS at 16:31

## 2024-07-23 RX ADMIN — INSULIN GLARGINE 25 UNITS: 100 INJECTION, SOLUTION SUBCUTANEOUS at 20:19

## 2024-07-23 RX ADMIN — PANTOPRAZOLE SODIUM 40 MG: 40 TABLET, DELAYED RELEASE ORAL at 08:52

## 2024-07-23 RX ADMIN — ACETAMINOPHEN 975 MG: 325 TABLET ORAL at 06:04

## 2024-07-23 RX ADMIN — INSULIN GLARGINE 25 UNITS: 100 INJECTION, SOLUTION SUBCUTANEOUS at 08:52

## 2024-07-23 RX ADMIN — ASPIRIN 81 MG: 81 TABLET, COATED ORAL at 17:09

## 2024-07-23 RX ADMIN — ATORVASTATIN CALCIUM 40 MG: 40 TABLET, FILM COATED ORAL at 16:30

## 2024-07-23 RX ADMIN — LEVOTHYROXINE SODIUM 175 MCG: 100 TABLET ORAL at 06:04

## 2024-07-23 RX ADMIN — HYDROMORPHONE HYDROCHLORIDE 2 MG: 2 TABLET ORAL at 22:05

## 2024-07-23 NOTE — PROGRESS NOTES
07/23/24 1112   Pain Assessment   Pain Assessment Tool 0-10   Pain Score 8   Pain Location/Orientation Orientation: Right;Location: Knee   Pain Onset/Description Onset: Ongoing   Restrictions/Precautions   Precautions Bed/chair alarms;Fall Risk;Limb alert;Pain;Supervision on toilet/commode   RLE Weight Bearing Per Order WBAT   ROM Restrictions No   Oral Hygiene   Type of Assistance Needed Set-up / clean-up   Physical Assistance Level No physical assistance   Comment w/c level   Oral Hygiene CARE Score 5   Grooming   Able To Initiate Tasks;Comb/Brush Hair;Wash/Dry Face;Brush/Clean Teeth;Wash/Dry Hands   Shower/Bathe Self   Type of Assistance Needed Supervision   Comment CHG wipes   Shower/Bathe Self CARE Score 4   Bathing   Assessed Bath Style Sponge Bath   Able to Adjust Water Temperature Yes   Able to Wash/Rinse/Dry (body part) Left Arm;Right Arm;L Upper Leg;R Upper Leg   Limitations Noted in Balance;Endurance;ROM;Safety;Strength   Positioning Seated;Standing   Upper Body Dressing   Type of Assistance Needed Set-up / clean-up   Physical Assistance Level No physical assistance   Upper Body Dressing CARE Score 5   Dressing/Undressing Clothing   Remove UB Clothes Pullover Shirt   Don UB Clothes Pullover Shirt   Limitations Noted In Balance;Strength;ROM;Endurance   Findings pt declined changing LB garments and socks and shoes   Sit to Stand   Type of Assistance Needed Incidental touching   Sit to Stand CARE Score 4   Exercise Tools   Other Exercise Tool 2 blue t band 3 sets of 15 in 5 planes of motion seated in chair   Cognition   Overall Cognitive Status WFL   Arousal/Participation Alert;Cooperative   Attention Within functional limits   Orientation Level Oriented X4   Memory Within functional limits   Following Commands Follows multistep commands without difficulty   Additional Activities   Additional Activities Comments fxnl mobility in room RW CGA  approx 12 feet. w/c mobility MI.   Activity Tolerance   Activity  Tolerance Patient tolerated treatment well   Assessment   Treatment Assessment OT tx addressed portions of ADl via sponge bath level as noted in respective sections of note. Pt demon good knowledge of safe techniuqes during ADl in w/c at sink.  Pt then participated in B UE TE for generalized strength and endurance for functional activity performance. Pt did not having episodes of dizziness during this session. Pt took approp rest  breaks throughout session. Plan is to continue with skilled OT to further progress pt toward OT goals.   Prognosis Good   Problem List Decreased strength;Decreased range of motion;Impaired balance;Decreased endurance;Decreased mobility;Decreased safety awareness;Decreased skin integrity;Pain   Plan   Treatment/Interventions ADL retraining;Functional transfer training;Therapeutic exercise;Endurance training;Patient/family training;Equipment eval/education;Gait training;Spoke to nursing;OT   Progress Progressing toward goals   OT Therapy Minutes   OT Time In 1112   OT Time Out 1203   OT Total Time (minutes) 51   OT Mode of treatment - Individual (minutes) 51   OT Mode of treatment - Concurrent (minutes) 0   OT Mode of treatment - Group (minutes) 0   OT Mode of treatment - Co-treat (minutes) 0   OT Mode of Treatment - Total time(minutes) 51 minutes   OT Cumulative Minutes 113   Therapy Time missed   Time missed? No

## 2024-07-23 NOTE — NURSING NOTE
Patient is awake and alert. Pleasant during interactions.  Right knee incision redressed per MD orders. Moderate amount serosanguineous drainage noted.  Swelling around incision noted.  Offers complaints of mild-moderate pain in right knee but did not request PRN pain medications for same.  Participated in therapy sessions and tolerated same well.  Safety maintained.  Call bell in reach at bedside.

## 2024-07-23 NOTE — WOUND OSTOMY CARE
Consult Note - Wound   Aaron Richards 59 y.o. male MRN: [id number]  Unit/Bed#: [facility] 215-01 Encounter: [id number]      History and Present Illness:  59 year old male admitted to [facility] 7/21/24.. Septic arthritic knee. PMH: Admitted to [facility] 7/15/24 underwent complicated right nad crafted antibiotic cement total knee arthroplasty with augmentation.Transferred to [facility] 7/21/2024. Complex medical history significant for PAD, type  2 Dm. Diabetic neuropathy. Anemia.leucocytosis.    Assessment Findings:   Wound Care consulted 7/21/24.for assessment of draining post op ortho incision.  Seen to today, seated out of bed, Discussed role of WOCN assessing incision.  Pt declined removal of dressing, stating it has been redressed and has markedly decreased drainage.Drainage know serosang from serous. Dressing is dry and intact with Kerlix and ace bandage.RN redressed this am.      Skin care plans:  1-Hydraguard to bilateral sacrum, buttock and heels BID and PRN  2-Elevate heels to offload pressure.  3-Ehob cushion in chair when out of bed.  4-Moisturize skin daily with skin nourishing cream.  5-Turn/reposition q2h or when medically stable for pressure re-distribution on skin.   6-Change dressing with DSD BID and PRN if soiled.  Notify MD of increased drainage, pus, redness, or breakdown of incision        Wound Care will sign off  Call or Tigertext with any questions    Savita REYNAGAN RN CWON

## 2024-07-23 NOTE — PROGRESS NOTES
07/23/24 1400   Pain Assessment   Pain Assessment Tool 0-10   Pain Score 8   Pain Location/Orientation Orientation: Right;Location: Knee   Pain Onset/Description Onset: Ongoing   Restrictions/Precautions   Precautions Bed/chair alarms;Fall Risk;Limb alert;Pain;Supervision on toilet/commode   RLE Weight Bearing Per Order WBAT   ROM Restrictions No   Eating   Type of Assistance Needed Independent   Eating CARE Score 6   Sit to Stand   Type of Assistance Needed Supervision   Physical Assistance Level No physical assistance   Sit to Stand CARE Score 4   Bed-Chair Transfer   Type of Assistance Needed Incidental touching;Verbal cues   Chair/Bed-to-Chair Transfer CARE Score 4   Transfer Bed/Chair/Wheelchair   Limitations Noted In Balance;Pain Management;LE Strength   Toileting Hygiene   Type of Assistance Needed Supervision   Toileting Hygiene CARE Score 4   Toilet Transfer   Type of Assistance Needed Supervision   Toilet Transfer CARE Score 4   Functional Standing Tolerance   Time 3m42s,3m8s,2m8s,2m15s   Activity static stance, dynamic weight shift, stance with B support as well as unilateral on table top   Cognition   Overall Cognitive Status WFL   Arousal/Participation Alert;Cooperative   Attention Within functional limits   Orientation Level Oriented X4   Memory Within functional limits   Following Commands Follows all commands and directions without difficulty   Activity Tolerance   Activity Tolerance Patient limited by fatigue   Assessment   Treatment Assessment Pt tx this date focused on standing tolerance with weight shifting and static stance as noted in repsective sections of note. Pt displayed few symptoms of dizziness during stance tasks.  Pt reporting he needed to sit for rest due to fatigue in thighs. Pt reporting he did not want abdominal binder donned. Pts BP assessed at 144/72 in seated position prior to tx, then after stance 123/67, then in stance 112/63 with no c/os offerred. Pt is progressing toward  OT goals with ongoing reports of pain in R knee while in stance and when resting. Plan is to contiue with skilled OT to further progress pt toward Ot goals.   Prognosis Good   Problem List Decreased strength;Decreased range of motion;Decreased endurance;Impaired balance;Decreased mobility;Decreased safety awareness;Pain;Decreased skin integrity   Plan   Treatment/Interventions Functional transfer training;Patient/family training;Equipment eval/education;Bed mobility;Endurance training;Compensatory technique education;Spoke to nursing;OT   Progress Progressing toward goals   OT Therapy Minutes   OT Time In 1400   OT Time Out 1448   OT Total Time (minutes) 48   OT Mode of treatment - Individual (minutes) 48   OT Mode of treatment - Concurrent (minutes) 0   OT Mode of treatment - Group (minutes) 0   OT Mode of treatment - Co-treat (minutes) 0   OT Mode of Treatment - Total time(minutes) 48 minutes   OT Cumulative Minutes 161   Therapy Time missed   Time missed? No

## 2024-07-23 NOTE — PROGRESS NOTES
"Progress Note - Aaron Richards 59 y.o. male MRN: [id number]    Unit/Bed#: [facility] 215-01 Encounter: [id number]        Subjective:   Patient seen and examined at bedside after reviewing the chart and discussing the case with the caring staff.      Patient examined at bedside.  Patient reports that his dizzy symptoms have much improved today although he still has evidence of orthostatic hypotension on numbers.    Patient's blood pressure and lying position 144/76, sitting 122/76 and standing 91/53.    Patient's labs were reviewed from 7/22/2024.  Patient's CMP showed glucose 104.  Patient's CBC showed hemoglobin 10.1 with hematocrit 31.3.    Physical Exam   Vitals: Blood pressure 122/72, pulse 83, temperature (!) 97.4 °F (36.3 °C), temperature source Temporal, resp. rate 16, height 6' 4\" (1.93 m), weight 111 kg (245 lb 6 oz), SpO2 94%.,Body mass index is 29.87 kg/m².  Constitutional: He appears well-developed.   HEENT: PERR, EOMI, MMM  Cardiovascular: Normal rate and regular rhythm.    Pulmonary/Chest: Effort normal and breath sounds normal.   Abdomen: Soft, + BS, NT    Assessment/Plan:  Aaron Richards is a(n) 59 y.o. male with Septic arthritis s/p R antibiotic cement TKA      Cardiac hx w/ HTN, HLD, PAD/orthostatic hypotension.  Continue ASA 81 mg twice daily (for 4 weeks for ortho DVT ppx, then back to daily), losartan 25 mg daily (home: irbesartan hydrochlorothiazide 150-12.5 mg daily), and atorvastatin 40 mg daily.  Patient will be put on abdominal binders.  I may consider midodrine.  DM2.  Hgb A1c 6.2%.  Continue Lantus 25 units twice daily.  SSI w/ POCT AS HS.  Carb controlled diet.  Home: Lantus 30 units BID and Apidra SSI.   Acquired hypothyroidism. Hx thyroid cancer s/p thyroidectomy.   Continue levothyroxine 175 mcg daily.   GERD.  Continue Protonix 40 mg daily.   CKD3b.  Stable.  Cr 0.71 7/17.  Avoid nephrotoxins.   MDD/anxiety.  Continue Lexapro 20 mg daily.   Hx of alcohol use.  Stable.  Sober since " 2023.   Insomnia.  Continue Ambien 10 mg HS as needed.   Pain and bowel regimen.  As per physiatry.   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  Continue daptomycin 6 mg/kg IV every 24 hours through 8/26.  Check CPK weekly.  Patient receiving intensive PT OT as per physiatry.     Anticipated date of discharge.  TBD.

## 2024-07-23 NOTE — NURSING NOTE
Patient resting in bed at this time.  No signs of distress noted.  Bed alarm in place to promote patient safety.  Right lower extremity knee dressing CDI. PICC line to the right upper extremity for IV antibiotic infusion.  Patient was continent of bowel and bladder overnight.  Patient was able to reposition frequently to promote skin integrity.  One assist provided to ambulate to the bathroom with walker.  Call bell within reach.  Plan of care ongoing. Patient declined a bath at this time.

## 2024-07-23 NOTE — PCC NURSING
7/22/24 Patient is a recent admission to [facility] following knee surgery for a septic arthritis of the right knee.  Patient remains alert and oriented.  Lungs are clear on room air.  Bilateral lower extremity trace edema noted.  Right knee incision with sutures in place twice a day dressing changes to be completed by nursing.  Patient is on IV antibiotics through a right upper extremity PICC line.  Blood sugars are being monitored four times a day as ordered.  Patient states he is comfortable self injecting his long acting insulin as he was taking this prior to admission.  Orthostatic blood pressure readings are being checked as ordered for three days.  Patient was able to transfer with one assist.  Pain controlled with scheduled Tylenol and as needed Dilaudid.     7/29/24 Patient remains alert and oriented.  Lungs are clear on room air.  Patient is continent of bowel and bladder.  IV antibiotics infused as ordered through the right upper extremity PICC line without difficulty.  Right knee incision is sutured with twice a day dressing changes completed by nursing.  Blood sugars are being checked four times a day as ordered.  Patient with continued episodes of dizziness and orthostatis blood pressure drops during the day.  Patient encouraged to wear an abdominal binder during therapy sessions.  Patient with noted nausea, vomiting and diarrhea over the weekend resolving now.  Patient received IV fluids for hydration during episode.  Patient receiving tylenol as needed for pain management.  Patient is able to ambulate with one assist and a walker to the bathroom.

## 2024-07-23 NOTE — PROGRESS NOTES
Physical Medicine and Rehabilitation Progress Note  Aaron Richards 59 y.o. male MRN: [id number]  Unit/Bed#: [facility] 215-01 Encounter: [id number]      Assessment & Plan:     Decline in ADLs and mobility: Functional assessment - improving         FIM  Care Score  Admit Score Recent Score    Total assist  1-100% or 2p    Tot     Max assist 2-51-75%    Sub     Mod assist 3- 26-50%  Par     Min assist 3- 25% or < Par To hygiene, LBD     CG assist 4  TA  LBD   Sup/Setup 4-5 Sup Bathing, UBD Other ADLs   Mod-I/Indep 6 MI      Transfers  Min  CG-Sup    Ambulation   10 ft mod assist  10 ft Sup     Stairs   Total assist/NT      Goal: Mod indep for most ADLs and for mobility except possible slight assist for LBD, bathing  Major barriers:  R knee sx, decreased ROM, pain, deconditioning  Dispo: Home with [facility] 12 days from admission      * Septic arthritis of knee (HCC)  Assessment & Plan  Prior traumatic HW failure requiring HW removal and then developed sinus tract extending into jt space with op finding c/w septic arthritis per last ID note  - S/p complicated right hand crafted antibiotic cement total knee arthroplasty with augmentation by ortho, Dr Hendrix on 7/15  Weight-bearing as tolerated on affected limb; ROM as tolerated, pillow/blankets under achilles not behind knee while in bed  Comgmt by IM during ARC course   Daptomycin IV through 8/26 for 6 week course - Monitor labs (CBC/BMP and CK weekly) while on Abx; remove PICC after IV abx   7/22 - Stable mild leukocytosis - 12K, BMP unremarkable, CK wnl  Monitor incision/area for infection or dehiscence/impaired healing - decreased serosang drainage, no clear signs of infection again  generalized knee edema stable - pt feels it is improving   Recommend wound care consult - pending   In interim, Change dressing with DSD BID and PRN if soiled to avoid maceration - hopefully can decrease when improves.  Notify MD of increased drainage, pus, redness, or breakdown of incision    POD#14 is Mon 7/29  Monitor for signs/symptoms of VTE, atelectasis, acute blood loss anemia, or other infection   Function improving   Continue acute comprehensive interdisciplinary inpatient rehabilitation to include intensive skilled therapies (PT, OT) as outlined with oversight and management by rehabilitation physician as well as inpatient rehab level nursing, case management and weekly interdisciplinary team meetings.   Follow-up with Ortho Sx after d/c and ortho if needed during ARC course       Poor dentition  Assessment & Plan  ID recommends OMFS eval for possible extractions - this was not obtained on acute PTA per prior providers - will recommend OP follow-up unless concerns in interim - discussed with pt   - Mgmt per IM here     PAD (peripheral artery disease) (HCC)  Assessment & Plan  Hx of peripheral arterial disease, with previous right popliteal artery stent, and subsequent TMA   - Overall management per hospitalist during acute course  - Antithrombotic: currently on aspiring 81mg BID (also for VTE proph) per prior providers   - Statin  - Optimal blood pressure and blood sugar control  (Was recommended asp and plavix when last seen by [facility] 4/2023-unclear why not on this)  - OP Vasc sx follow-up         Hypertension, essential  Assessment & Plan  Also some recent orthostasis with adjustments in regimen PTA - Abdominal binder PRN OOB   Internal medicine consulted and co-management with their service  Monitor vitals with and without activity; monitor for orthostasis  Monitor hemoglobin, electrolytes, kidney function, hydration status   Current meds: Losartan 25mg qday         Leukocytosis  Assessment & Plan  Stable at 12K on 7/22   Comgmt with IM team  - Monitor closely  - On IV abx per ortho  - Monitor exam, labs    Anemia  Assessment & Plan  Hb 10.1 on 7/22  Consult(ed) IM and comanagement with their service during ARC course   Monitor H/H, vitals, signs/symptoms of acute bleeding      Diabetic  neuropathy (HCC)  Assessment & Plan  Optimal DM mgmt  Monitor skin for increased infection/breakdown/wounds which patient is at higher risk for  Skilled PT/OT   Fall precautions      At risk for venous thromboembolism (VTE)  Assessment & Plan  SCDs, ambulation, and aspirin 81mg BID per ortho       Pain  Assessment & Plan  Controlled   Ice PRN   APAP 975mg TID   Hydromorphone 2mg Q4H PRN (pt felt 4mg was too strong)   Monitor for oversedation, AMS/delirium, and respiratory depression   If being administered - hold opiates, muscle relaxants, benzodiazepines, and gabapentin for oversedation, AMS, or RR<12.  Counseled on and continue to encourage deep breathing/relaxation/behavioral pain management techniques      Depression and anxiety  Assessment & Plan  Lexapro   Supportive counseling  Counseled on and continue to encourage deep breathing/relaxation/behavioral management techniques      Type 2 diabetes mellitus with diabetic peripheral angiopathy without gangrene (HCC)  Assessment & Plan  Lab Results   Component Value Date    HGBA1C 6.2 (H) 07/01/2024     - Current management by internal medicine  - Monitor for signs and symptoms of hypoglycemia   - Current meds: Lantus, Lispro per IM   - Consistent carb diabetic diet  - Recommend close monitoring and optimal management during recovery/rehab phase as well       Hyperlipidemia  Assessment & Plan  Statin     Gastroesophageal reflux disease without esophagitis  Assessment & Plan  PPI        Other Medical Issues:  Monitor for       Restrictions include:  Fall precautions    Objective:    Allergies per EMR  Diagnostic Studies: Reviewed  No orders to display     See above as well    Laboratory: Labs reviewed  Results from last 7 days   Lab Units 07/22/24  0543 07/18/24  0520 07/17/24  0610   HEMOGLOBIN g/dL 10.1* 9.4* 9.7*   HEMATOCRIT % 31.3* 28.7* 28.9*   WBC Thousand/uL 12.98* 12.40* 11.62*     Results from last 7 days   Lab Units 07/22/24  0543 07/17/24  0610   BUN  mg/dL 17 22   SODIUM mmol/L 140 136   POTASSIUM mmol/L 4.0 3.6   CHLORIDE mmol/L 106 107   CREATININE mg/dL 0.65 0.71   AST U/L 20  --    ALT U/L 21  --             Drug regimen reviewed, all potential adverse effects identified and addressed:    Scheduled Meds:  Current Facility-Administered Medications   Medication Dose Route Frequency Provider Last Rate    acetaminophen  975 mg Oral Q8H PRN Meir Prather MD      ascorbic acid  500 mg Oral BID Jessie Bailey PA-C      aspirin  81 mg Oral BID Jessie Bailey PA-C      atorvastatin  40 mg Oral Daily With Dinner Jessie Bailey PA-C      DAPTOmycin  6 mg/kg (Adjusted) Intravenous Q24H Jessie Bailey PA-C 575 mg (07/23/24 1631)    docusate sodium  100 mg Oral BID Jessie Bailey PA-C      escitalopram  5 mg Oral Daily Jessie Bailey PA-C      HYDROmorphone  2 mg Oral Q4H PRN Meir Prather MD      insulin glargine  25 Units Subcutaneous Q12H TESS Jessie Bailey PA-C      insulin lispro  1-6 Units Subcutaneous TID AC Jessie Bailey PA-C      levothyroxine  175 mcg Oral Early Morning Jessie Bailey PA-C      losartan  25 mg Oral Daily Jessie Bailey PA-C      ondansetron  4 mg Intravenous Q6H PRN Jessie Bailey PA-C      pantoprazole  40 mg Oral Every Other Day Jessie Bailey PA-C      senna  1 tablet Oral BID PRN Meir Prather MD      zolpidem  10 mg Oral HS PRN Jessie Bailey PA-C         Chief Complaints:  Rehab follow-up     Subjective:     On eval, patient reports R knee swelling improving.  He denies fever, chills, SOB. Still notes occasional lightheadedness with standing but tolerable.  He denies constipation, or other new complaints.     ROS: A 10 point ROS was performed; negative except as noted above.       Physical Exam:  Vitals:    07/23/24 0819   BP: 122/72   Pulse:    Resp:    Temp:    SpO2:          GEN:  Lying in bed in NAD    HEENT/NECK: Normocephalic, atraumatic, moist mucous membranes   CARDIAC: Regular rate rhythm, no murmers, no rubs, no gallops  LUNGS:  clear to auscultation, no wheezes, rales, or rhonchi  ABDOMEN: Soft, non-tender, non-distended, normal active bowel sounds  EXTREMITIES/SKIN:  R knee significant edema improving slightly; incision with slight serosang drainage without clear signs of infection  NEURO:   MENTAL STATUS:  Appropriate wakefulness and interaction   PSYCH:  Affect:  Euthymic     HPI:  59 year old M with PMH of HTN, PAD, R TMA, HLD, DM2, CKD, GERD, diabetic neuropathy who had history of traumatic HW failure requiring removal of HW and later developed sinus track extending into jt space with op finding of septic arthritis who underwent complicated R hand crafted abx cement TKA with augmentation by Dr Hendrix on 7/15. ID consulted and recommended IV Dapto for 6 week course with closely monitoring labs. Post-op course notable for pain, leukocytosis, anemia, and significant decline in ADLs and mobility.     ** Please Note: Fluency Direct voice to text software may have been used in the creation of this document. **

## 2024-07-23 NOTE — PROGRESS NOTES
07/23/24 0700   Pain Assessment   Pain Assessment Tool 0-10   Pain Score 8   Pain Location/Orientation Orientation: Right;Location: Knee   Restrictions/Precautions   Precautions Bed/chair alarms;Fall Risk;Limb alert;Pain;Supervision on toilet/commode   RLE Weight Bearing Per Order WBAT   ROM Restrictions No   Braces or Orthoses   (abdominal binder)   General   Change In Medical/Functional Status (S)  Pt continues to display orthostatic hypotension with symptoms during positional changes, mostly from sitting to standing. Abdominal binder donned during mid session   Cognition   Overall Cognitive Status WFL   Arousal/Participation Alert;Responsive;Cooperative   Attention Within functional limits   Orientation Level Oriented X4   Memory Within functional limits   Following Commands Follows one step commands without difficulty   Subjective   Subjective Pt reports he did multiple prolonged stands with OT yesterday, felt great   Roll Left and Right   Type of Assistance Needed Independent   Physical Assistance Level No physical assistance   Comment HOB flat, no bed rail   Roll Left and Right CARE Score 6   Sit to Lying   Type of Assistance Needed Independent   Physical Assistance Level No physical assistance   Comment HOB flat, no bed rail   Sit to Lying CARE Score 6   Lying to Sitting on Side of Bed   Type of Assistance Needed Independent   Physical Assistance Level No physical assistance   Comment HOB flat, no bed rail   Lying to Sitting on Side of Bed CARE Score 6   Sit to Stand   Type of Assistance Needed Incidental touching   Physical Assistance Level No physical assistance   Comment CGA no AD vs RW   Sit to Stand CARE Score 4   Bed-Chair Transfer   Type of Assistance Needed Physical assistance   Physical Assistance Level 25% or less   Comment CGA/La SPT no AD vs RW   Chair/Bed-to-Chair Transfer CARE Score 3   Car Transfer   Reason if not Attempted Environmental limitations   Car Transfer CARE Score 10   Walk 10  Feet   Type of Assistance Needed Physical assistance   Physical Assistance Level Total assistance   Comment La RW; chair follow 2/2 orthostasis concerns   Walk 10 Feet CARE Score 1   Walk 50 Feet with Two Turns   Type of Assistance Needed Physical assistance   Physical Assistance Level Total assistance   Comment La RW; chair follow 2/2 orthostasis concerns   Walk 50 Feet with Two Turns CARE Score 1   Walk 150 Feet   Reason if not Attempted Safety concerns   Walk 150 Feet CARE Score 88   Walking 10 Feet on Uneven Surfaces   Type of Assistance Needed Physical assistance   Physical Assistance Level Total assistance   Comment La RW on green foam; chair follow 2/2 orthostasis concerns   Walking 10 Feet on Uneven Surfaces CARE Score 1   Ambulation   Primary Mode of Locomotion Prior to Admission Walk   Distance Walked (feet) 50 ft  (50', 40')   Assist Device Roller Walker   Gait Pattern Antalgic;Slow Lisa;Decreased foot clearance   Limitations Noted In Balance;Device Management;Endurance;Heel Strike;Posture;Sensation;Speed;Strength   Provided Assistance with: Balance   Walk Assist Level Minimum Assist   Findings La RW 2/2 LOB to L from fatigue, CGA otherwise; increased dizziness/lightheadedness by end of ambulatory trials; /72 first trial, /68 2nd trial, symptoms relieved with seated rest break; chair follow 2/2 orthostasis concerns   Does the patient walk? 2. Yes   Wheel 50 Feet with Two Turns   Type of Assistance Needed Independent   Physical Assistance Level No physical assistance   Wheel 50 Feet with Two Turns CARE Score 6   Wheel 150 Feet   Type of Assistance Needed Independent   Physical Assistance Level No physical assistance   Wheel 150 Feet CARE Score 6   Wheelchair mobility   Does the patient use a wheelchair? 1. Yes   Type of Wheelchair Used 1. Manual   Findings mod I B/L LE/UE on level surfaces   Curb or Single Stair   Comment orthostasis concerns   Reason if not Attempted Safety concerns    1 Step (Curb) CARE Score 88   4 Steps   Reason if not Attempted Safety concerns   4 Steps CARE Score 88   12 Steps   Reason if not Attempted Activity not applicable   12 Steps CARE Score 9   Picking Up Object   Reason if not Attempted Safety concerns   Picking Up Object CARE Score 88   Toilet Transfer   Comment Pt did not require during session   Therapeutic Interventions   Strengthening Supine LE TE   Flexibility Supine knee flexion stretch with strap   Other bed mobility, transfer training, gait training, w/c mobility   Assessment   Treatment Assessment Pt agreeable to PT this AM, received supine in bed. Pt continues to demonstrate orthostasis with positional changes, especially from sitting to standing. Pt reported decreased symptoms this session with standing and transfers, but symptoms became exacerbated following ambulation, BP measured manually 122/72 following first trial, 118/68 following second trial, symptoms decrease following seated rest breaks. Pt tolerated supine LE TE well and demonstrating improved endurance with prolonged ambulation. Still remains limited by orthostasis and pain. Will continue with current PT POC to improve deficits and promote return to PLOF.   Problem List Decreased strength;Decreased range of motion;Decreased endurance;Impaired balance;Decreased mobility;Decreased safety awareness;Decreased skin integrity;Pain   PT Barriers   Physical Impairment Decreased strength;Decreased range of motion;Decreased endurance;Impaired balance;Decreased safety awareness;Impaired sensation;Orthopedic restrictions;Pain   Functional Limitation Walking;Transfers;Standing;Stair negotiation;Ramp negotiation;Car transfers   Plan   Treatment/Interventions Functional transfer training;LE strengthening/ROM;Therapeutic exercise;Endurance training;Patient/family training;Equipment eval/education;Bed mobility;Gait training   Progress Progressing toward goals   PT Therapy Minutes   PT Time In 0700   PT Time  Out 0832   PT Total Time (minutes) 92   PT Mode of treatment - Individual (minutes) 92   PT Mode of treatment - Concurrent (minutes) 0   PT Mode of treatment - Group (minutes) 0   PT Mode of treatment - Co-treat (minutes) 0   PT Mode of Treatment - Total time(minutes) 92 minutes   PT Cumulative Minutes 184     Supine LE TE:  3x10, B/L LEs  SLR - flexion  SLR - ABd - anti-gravity  GS  QS  APs  SAQ  Hip ABd - hooklying with blue t-band  Hip ADd - yvonne  Heel slides    Patient remains OOB in chair, all needs in reach.  Alarm in place and activated.  Encouraged use of call bell, patient verbalizes understanding.

## 2024-07-24 PROBLEM — I95.1 ORTHOSTASIS: Status: ACTIVE | Noted: 2020-10-08

## 2024-07-24 LAB
GLUCOSE SERPL-MCNC: 130 MG/DL (ref 65–140)
GLUCOSE SERPL-MCNC: 149 MG/DL (ref 65–140)
GLUCOSE SERPL-MCNC: 196 MG/DL (ref 65–140)
GLUCOSE SERPL-MCNC: 86 MG/DL (ref 65–140)

## 2024-07-24 PROCEDURE — 97116 GAIT TRAINING THERAPY: CPT

## 2024-07-24 PROCEDURE — 99233 SBSQ HOSP IP/OBS HIGH 50: CPT | Performed by: PHYSICAL MEDICINE & REHABILITATION

## 2024-07-24 PROCEDURE — 97110 THERAPEUTIC EXERCISES: CPT

## 2024-07-24 PROCEDURE — 97530 THERAPEUTIC ACTIVITIES: CPT

## 2024-07-24 PROCEDURE — 97535 SELF CARE MNGMENT TRAINING: CPT

## 2024-07-24 PROCEDURE — 97110 THERAPEUTIC EXERCISES: CPT | Performed by: PHYSICAL THERAPIST

## 2024-07-24 PROCEDURE — 97542 WHEELCHAIR MNGMENT TRAINING: CPT | Performed by: PHYSICAL THERAPIST

## 2024-07-24 PROCEDURE — 82948 REAGENT STRIP/BLOOD GLUCOSE: CPT

## 2024-07-24 PROCEDURE — 97530 THERAPEUTIC ACTIVITIES: CPT | Performed by: PHYSICAL THERAPIST

## 2024-07-24 RX ADMIN — INSULIN GLARGINE 25 UNITS: 100 INJECTION, SOLUTION SUBCUTANEOUS at 21:03

## 2024-07-24 RX ADMIN — OXYCODONE HYDROCHLORIDE AND ACETAMINOPHEN 500 MG: 500 TABLET ORAL at 17:14

## 2024-07-24 RX ADMIN — LOSARTAN POTASSIUM 25 MG: 25 TABLET, FILM COATED ORAL at 08:19

## 2024-07-24 RX ADMIN — DOCUSATE SODIUM 100 MG: 100 CAPSULE, LIQUID FILLED ORAL at 17:14

## 2024-07-24 RX ADMIN — ASPIRIN 81 MG: 81 TABLET, COATED ORAL at 08:19

## 2024-07-24 RX ADMIN — ESCITALOPRAM OXALATE 5 MG: 5 TABLET, FILM COATED ORAL at 08:19

## 2024-07-24 RX ADMIN — DAPTOMYCIN 575 MG: 500 INJECTION, POWDER, LYOPHILIZED, FOR SOLUTION INTRAVENOUS at 16:27

## 2024-07-24 RX ADMIN — OXYCODONE HYDROCHLORIDE AND ACETAMINOPHEN 500 MG: 500 TABLET ORAL at 08:19

## 2024-07-24 RX ADMIN — ACETAMINOPHEN 975 MG: 325 TABLET ORAL at 05:46

## 2024-07-24 RX ADMIN — ATORVASTATIN CALCIUM 40 MG: 40 TABLET, FILM COATED ORAL at 17:14

## 2024-07-24 RX ADMIN — INSULIN GLARGINE 25 UNITS: 100 INJECTION, SOLUTION SUBCUTANEOUS at 08:19

## 2024-07-24 RX ADMIN — LEVOTHYROXINE SODIUM 175 MCG: 100 TABLET ORAL at 05:44

## 2024-07-24 RX ADMIN — ASPIRIN 81 MG: 81 TABLET, COATED ORAL at 17:14

## 2024-07-24 NOTE — PROGRESS NOTES
"Progress Note - Aaron Richards 59 y.o. male MRN: [id number]    Unit/Bed#: -01 Encounter: [id number]        Subjective:   Patient seen and examined at bedside after reviewing the chart and discussing the case with the caring staff.      Patient examined at bedside.  Patient reports no acute symptoms.    Patient's blood pressure and lying position 144/76, sitting 122/76 and standing 91/53.    Physical Exam   Vitals: Blood pressure 131/79, pulse 83, temperature 97.9 °F (36.6 °C), temperature source Temporal, resp. rate 16, height 6' 4\" (1.93 m), weight 111 kg (245 lb 6 oz), SpO2 96%.,Body mass index is 29.87 kg/m².  Constitutional: He appears well-developed.   HEENT: PERR, EOMI, MMM  Cardiovascular: Normal rate and regular rhythm.    Pulmonary/Chest: Effort normal and breath sounds normal.   Abdomen: Soft, + BS, NT    Assessment/Plan:  Aaron Richards is a(n) 59 y.o. male with Septic arthritis s/p R antibiotic cement TKA      Cardiac hx w/ HTN, HLD, PAD/orthostatic hypotension.  Continue ASA 81 mg twice daily (for 4 weeks for ortho DVT ppx, then back to daily), losartan 25 mg daily (home: irbesartan hydrochlorothiazide 150-12.5 mg daily), and atorvastatin 40 mg daily.  Patient will be put on abdominal binders.  I may consider midodrine.  DM2.  Hgb A1c 6.2%.  Continue Lantus 25 units twice daily.  SSI w/ POCT AS HS.  Carb controlled diet.  Home: Lantus 30 units BID and Apidra SSI.   Acquired hypothyroidism. Hx thyroid cancer s/p thyroidectomy.   Continue levothyroxine 175 mcg daily.   GERD.  Continue Protonix 40 mg daily.   CKD3b.  Stable.  Cr 0.71 7/17.  Avoid nephrotoxins.   MDD/anxiety.  Continue Lexapro 20 mg daily.   Hx of alcohol use.  Stable.  Sober since 2023.   Insomnia.  Continue Ambien 10 mg HS as needed.   Pain and bowel regimen.  As per physiatry.   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  Continue daptomycin 6 mg/kg IV every 24 hours through 8/26.  Check CPK weekly.  Patient receiving " intensive PT OT as per physiatry.     Anticipated date of discharge.  TBD.

## 2024-07-24 NOTE — PROGRESS NOTES
07/24/24 1330   Pain Assessment   Pain Assessment Tool 0-10   Pain Score 7   Pain Location/Orientation Orientation: Right;Location: Knee   Pain Onset/Description Onset: Ongoing   Restrictions/Precautions   Precautions Bed/chair alarms;Fall Risk;Limb alert;Pain;Supervision on toilet/commode   RLE Weight Bearing Per Order WBAT   Sit to Lying   Type of Assistance Needed Independent   Physical Assistance Level No physical assistance   Sit to Lying CARE Score 6   Sit to Stand   Type of Assistance Needed Independent   Physical Assistance Level No physical assistance   Sit to Stand CARE Score 6   Bed-Chair Transfer   Type of Assistance Needed Set-up / clean-up   Physical Assistance Level No physical assistance   Chair/Bed-to-Chair Transfer CARE Score 5   Transfer Bed/Chair/Wheelchair   Limitations Noted In Balance;Pain Management;LE Strength   Exercise Tools   Other Exercise Tool 2 3# dowel 2x15 reps in 5 planes of motion seated upright in w/c   Cognition   Overall Cognitive Status WFL   Arousal/Participation Alert;Cooperative   Attention Within functional limits   Orientation Level Oriented X4   Memory Within functional limits   Following Commands Follows all commands and directions without difficulty   Additional Activities   Additional Activities Comments w/c mobility MI   Activity Tolerance   Activity Tolerance Patient tolerated treatment well   Assessment   Treatment Assessment Pt brief session addressed B UE TE for generalized strength and endurance for functional activity performance. Pt tolerated well. OT advised position of R LE while in bed with blanket roll to be placed laterally to allow LE to maintain good alignment. Plan is to contiue with skilled OT to further progress pt toward OT goals as per POC.   Prognosis Good   Problem List Decreased strength;Decreased range of motion;Decreased endurance;Impaired balance;Decreased mobility;Decreased skin integrity;Pain   Plan   Treatment/Interventions ADL  retraining;Functional transfer training;Therapeutic exercise;Endurance training;Patient/family training;Equipment eval/education;Bed mobility;Compensatory technique education;OT   OT Therapy Minutes   OT Time In 1330   OT Time Out 1354   OT Total Time (minutes) 24   OT Mode of treatment - Individual (minutes) 24   OT Mode of treatment - Concurrent (minutes) 0   OT Mode of treatment - Group (minutes) 0   OT Mode of treatment - Co-treat (minutes) 0   OT Mode of Treatment - Total time(minutes) 24 minutes   OT Cumulative Minutes 275   Therapy Time missed   Time missed? No

## 2024-07-24 NOTE — PROGRESS NOTES
07/24/24 0900   Pain Assessment   Pain Assessment Tool 0-10   Pain Score 8   Pain Location/Orientation Orientation: Right;Location: Knee   Pain Onset/Description Onset: Ongoing   Effect of Pain on Daily Activities decreased ADL/iADL   Patient's Stated Pain Goal No pain   Hospital Pain Intervention(s) Medication (See MAR);Shower/Bath;Rest   Restrictions/Precautions   Precautions Bed/chair alarms;Fall Risk;Pain;Supervision on toilet/commode;Limb alert   RLE Weight Bearing Per Order WBAT   ROM Restrictions No   Oral Hygiene   Type of Assistance Needed Set-up / clean-up   Physical Assistance Level No physical assistance   Oral Hygiene CARE Score 5   Grooming   Able To Initiate Tasks;Comb/Brush Hair;Wash/Dry Face;Brush/Clean Teeth;Wash/Dry Hands   Findings w/c level   Shower/Bathe Self   Type of Assistance Needed Set-up / clean-up   Physical Assistance Level No physical assistance   Shower/Bathe Self CARE Score 5   Bathing   Assessed Bath Style Shower   Anticipated D/C Bath Style Shower   Able to Gather/Transport Yes   Able to Adjust Water Temperature Yes   Able to Wash/Rinse/Dry (body part) Left Arm;Right Arm;L Upper Leg;R Upper Leg;L Lower Leg/Foot;Chest;Abdomen;Perineal Area;Buttocks   Limitations Noted in Balance;Endurance;ROM;Strength;Safety   Positioning Seated;Standing   Adaptive Equipment Hand Held Shower;Tub Bench;Shower Bars   Tub/Shower Transfer   Limitations Noted In Balance;ROM;LE Strength;Safety   Adaptive Equipment Grab Bars;Transfer Bench   Assessed Tub/shower combo   Findings CS   Upper Body Dressing   Type of Assistance Needed Set-up / clean-up   Physical Assistance Level No physical assistance   Upper Body Dressing CARE Score 5   Lower Body Dressing   Type of Assistance Needed Set-up / clean-up   Physical Assistance Level No physical assistance   Lower Body Dressing CARE Score 5   Putting On/Taking Off Footwear   Type of Assistance Needed Set-up / clean-up   Physical Assistance Level No physical  assistance   Putting On/Taking Off Footwear CARE Score 5   Dressing/Undressing Clothing   Remove UB Clothes Pullover Shirt   Don UB Clothes Pullover Shirt   Remove LB Clothes Socks;Shoes;Shorts   Don LB Clothes Shorts;Socks;Shoes   Limitations Noted In Balance;Strength   Lying to Sitting on Side of Bed   Type of Assistance Needed Independent   Physical Assistance Level No physical assistance   Lying to Sitting on Side of Bed CARE Score 6   Sit to Stand   Type of Assistance Needed Independent   Physical Assistance Level No physical assistance   Sit to Stand CARE Score 6   Bed-Chair Transfer   Type of Assistance Needed Set-up / clean-up   Physical Assistance Level No physical assistance   Chair/Bed-to-Chair Transfer CARE Score 5   Transfer Bed/Chair/Wheelchair   Limitations Noted In Balance;Pain Management   Toileting Hygiene   Type of Assistance Needed Supervision   Physical Assistance Level No physical assistance   Toileting Hygiene CARE Score 4   Toileting   Able to Pull Clothing down yes, up yes.   Manage Hygiene Bladder;Bowel   Limitations Noted In Balance;LE Strength   Adaptive Equipment Grab Bar   Toilet Transfer   Type of Assistance Needed Supervision;Adaptive equipment   Physical Assistance Level No physical assistance   Toilet Transfer CARE Score 4   Toilet Transfer   Surface Assessed Raised Toilet   Transfer Technique Standard   Limitations Noted In Balance;LE Strength   Adaptive Equipment Walker   Light Housekeeping   Light Housekeeping Level Wheelchair   Light Housekeeping Level of Assistance Modified independent   Light Housekeeping s/u and c/u ADL supplies   Exercise Tools   Other Exercise Tool 2 2# dowel 2x15 reps in shld flex/ext plane   Cognition   Overall Cognitive Status WFL   Arousal/Participation Alert;Cooperative   Attention Within functional limits   Orientation Level Oriented X4   Memory Within functional limits   Following Commands Follows all commands and directions without difficulty    Additional Activities   Additional Activities Comments w/c mobility MI, switched chair from high back to standard w/c at pt request   Activity Tolerance   Activity Tolerance Patient tolerated treatment well   Assessment   Treatment Assessment OT tx addressed ADL via shower level with focus on safe techniuqes and compensatory strategies. Pts R knee covered with waterproof sleeve to maintain dryness as well as covered PICC line. Refer to respectvie sections of note for details of session. Pt demon good safety and technique with all functional tasks. Pt reported increased pain in R knee during pm hour. Pt reports this a.m. it feels better than last night. Plan is to continue with skilled OT to further progress pt toward OT goals.   Prognosis Good   Problem List Decreased strength;Decreased range of motion;Decreased endurance;Impaired balance;Decreased mobility;Decreased safety awareness;Pain;Decreased skin integrity   Plan   Treatment/Interventions ADL retraining;Functional transfer training;Endurance training;Patient/family training;Equipment eval/education;Bed mobility;Gait training;Compensatory technique education;Spoke to nursing;OT   Progress Progressing toward goals   OT Therapy Minutes   OT Time In 0900   OT Time Out 1030   OT Total Time (minutes) 90   OT Mode of treatment - Individual (minutes) 90   OT Mode of treatment - Concurrent (minutes) 0   OT Mode of treatment - Group (minutes) 0   OT Mode of treatment - Co-treat (minutes) 0   OT Mode of Treatment - Total time(minutes) 90 minutes   OT Cumulative Minutes 251   Therapy Time missed   Time missed? No

## 2024-07-24 NOTE — NURSING NOTE
Patient resting in bed at this time.  No signs of distress noted.  Right knee dressing CDI at this time.  Right upper extremity PICC line for long term antibiotic therapy flushes without difficulty.  Patient was able to ambulate with one assist and a walker to the bathroom overnight.  SCD as ordered for DVT prevention.  Patient was able to reposition frequently to promote skin integrity.  Call bell within reach.  Plan of care ongoing.

## 2024-07-24 NOTE — PCC CARE MANAGEMENT
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 Patient lives with spouse in ranch home with 4STE. Family transports. Patient has PICC.IV abx course until 8/26  For discharge on 8/1, [facility] referral made, IV abx and supplies $520 for duration until 8/26 of abx. [facility] home care arranged for RN/PT/OT. Virtual ID appt today at 1:00,  ortho appt TBD.

## 2024-07-24 NOTE — PROGRESS NOTES
PM&R PROGRESS NOTE:  Aaron Richards 59 y.o. male MRN: [id number]  Unit/Bed#: -01 Encounter: [id number]        Rehab Diagnosis: Orthopedic Disorders:  08.61  Unilateral Knee Replacement  Etiologic Diagnosis:   s/p R antibiotic cement TKA with Dr. 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 Prather: 59 year old M with PMH of HTN, PAD, R TMA, HLD, DM2, CKD, GERD, diabetic neuropathy who had history of traumatic HW failure requiring removal of HW and later developed sinus track extending into jt space with op finding of septic arthritis who underwent complicated R hand crafted abx cement TKA with augmentation by Dr Hendrix on 7/15. ID consulted and recommended IV Dapto for 6 week course with closely monitoring labs. Post-op course notable for pain, leukocytosis, anemia, and significant decline in ADLs and mobility.     SUBJECTIVE: Patient seen face to face.  No acute issues.  Denies fevers or chills.  Progressing as expected in rehabilitation.  Today did not have any orthostasis.  Was able to tolerate a good OT session with change of positions without any symptoms and was not wearing an abdominal binder or compression stockings.    ASSESSMENT: Stable, progressing      PLAN:    Rehabilitation  Continue current rehabilitation plan of care to maximize function.    I personally attended, reviewed, and discussed medical and functional updates in team conference today.  Please refer to advance care planning note for details.  Estimated Discharge: Thursday, 8/1/2024 with home health care RN, PT, OT      Current Function:  Physical Therapy Occupational Therapy Speech Therapy   Weight Bearing Status: Weight Bearing as Tolerated  Transfers: Incidental Touching, Minimal Assistance (cg STS with RW; CG/min A SPT with walker)  Bed Mobility: Independent  Amulation Distance (ft): 50 feet  Ambulation: Minimal Assistance  Assistive Device for Ambulation: Roller  Walker  Wheelchair Mobility Distance: 150 ft  Wheelchair Mobility: Independent  Number of Stairs:  (TBA)  Assistive Device for Stairs:  (TBA)  Stair Assistance:  (TBA)  Ramp:  (TBA)  Assistive Device for Ramp:  (TBA)  Discharge Recommendations: Home with:  DC Home with:: Family Support, First Floor Setup, Home Physical Therapy, Outpatient Physical Therapy   Eating: Independent  Grooming: Supervision  Bathing: Supervision  Bathing: Supervision  Upper Body Dressing: Supervision  Lower Body Dressing: Supervision  Toileting: Supervision  Tub/Shower Transfer:  (TBA)  Toilet Transfer: Supervision, Incidental Touching  Cognition: Within Defined Limits  Orientation: Person, Place, Time, Situation                 DVT prophylaxis  Continue aspirin 81 mg twice daily-per orthopedics    Bladder plan  Continent    Bowel plan  Continent      * Septic arthritis of knee (HCC)  Assessment & Plan  Prior traumatic HW failure requiring HW removal and then developed sinus tract extending into jt space with op finding c/w septic arthritis per last ID note  - S/p complicated right hand crafted antibiotic cement total knee arthroplasty with augmentation by ortho, Dr Hendrix on 7/15  Weight-bearing as tolerated on affected limb; ROM as tolerated, pillow/blankets under achilles not behind knee while in bed  Comgmt by IM during ARC course   Daptomycin IV through 8/26 for 6 week course - Monitor labs (CBC/BMP and CK weekly) while on Abx; remove PICC after IV abx   7/22 - Stable mild leukocytosis - 12K, BMP unremarkable, CK wnl  Monitor incision/area for infection or dehiscence/impaired healing - decreased serosang drainage, no clear signs of infection again  generalized knee edema stable - pt feels it is improving   Recommend wound care consult - pending   In interim, Change dressing with DSD BID and PRN if soiled to avoid maceration - hopefully can decrease when improves.  Notify MD of increased drainage, pus, redness, or breakdown of incision    POD#14 is Mon 7/29  Monitor for signs/symptoms of VTE, atelectasis, acute blood loss anemia, or other infection   Function improving   Continue acute comprehensive interdisciplinary inpatient rehabilitation to include intensive skilled therapies (PT, OT) as outlined with oversight and management by rehabilitation physician as well as inpatient rehab level nursing, case management and weekly interdisciplinary team meetings.   Follow-up with Ortho Sx after d/c and ortho if needed during ARC course       Leukocytosis  Assessment & Plan  Stable at 12K on 7/22   Comgmt with IM team  - Monitor closely  - On IV abx per ortho  - Monitor exam, labs    Anemia  Assessment & Plan  Hb 10.1 on 7/22  Consult(ed) IM and comanagement with their service during ARC course   Monitor H/H, vitals, signs/symptoms of acute bleeding      Diabetic neuropathy (HCC)  Assessment & Plan  Optimal DM mgmt  Monitor skin for increased infection/breakdown/wounds which patient is at higher risk for  Skilled PT/OT   Fall precautions      At risk for venous thromboembolism (VTE)  Assessment & Plan  SCDs, ambulation, and aspirin 81mg BID per ortho       Pain  Assessment & Plan  Controlled   Ice PRN   APAP 975mg TID   Hydromorphone 2mg Q4H PRN (pt felt 4mg was too strong)   Monitor for oversedation, AMS/delirium, and respiratory depression   If being administered - hold opiates, muscle relaxants, benzodiazepines, and gabapentin for oversedation, AMS, or RR<12.  Counseled on and continue to encourage deep breathing/relaxation/behavioral pain management techniques      Poor dentition  Assessment & Plan  ID recommends OMFS eval for possible extractions - this was not obtained on acute PTA per prior providers - will recommend OP follow-up unless concerns in interim - discussed with pt   - Mgmt per IM here     Depression and anxiety  Assessment & Plan  Lexapro   Supportive counseling  Counseled on and continue to encourage deep  breathing/relaxation/behavioral management techniques      Type 2 diabetes mellitus with diabetic peripheral angiopathy without gangrene (HCC)  Assessment & Plan  Lab Results   Component Value Date    HGBA1C 6.2 (H) 07/01/2024     - Current management by internal medicine  - Monitor for signs and symptoms of hypoglycemia   - Current meds: Lantus, Lispro per IM   - Consistent carb diabetic diet  - Recommend close monitoring and optimal management during recovery/rehab phase as well       Orthostasis  Assessment & Plan  Acute orthostasis upon arrival to [facility]  Abdominal binder and compression stockings as needed during therapies  Adequate hydration  Much improved 7/24/2024    PAD (peripheral artery disease) ([facility])  Assessment & Plan  Hx of peripheral arterial disease, with previous right popliteal artery stent, and subsequent TMA   - Overall management per hospitalist during acute course  - Antithrombotic: currently on aspiring 81mg BID (also for VTE proph) per prior providers   - Statin  - Optimal blood pressure and blood sugar control  (Was recommended asp and plavix when last seen by [facility] 4/2023-unclear why not on this)  - OP Vas sx follow-up         Hyperlipidemia  Assessment & Plan  Statin     Gastroesophageal reflux disease without esophagitis  Assessment & Plan  PPI    Hypertension, essential  Assessment & Plan  Also some recent orthostasis with adjustments in regimen PTA - Abdominal binder PRN OOB   Internal medicine consulted and co-management with their service  Monitor vitals with and without activity; monitor for orthostasis  Monitor hemoglobin, electrolytes, kidney function, hydration status   Current meds: Losartan 25mg qday           Labs, medications, and imaging personally reviewed 07/24/24.      ROS:  A ten point review of systems was completed on 07/24/24  and pertinent positives are listed in subjective section. All other systems reviewed were negative.     OBJECTIVE:   /79 (BP Location: Left  "arm)   Pulse 83   Temp 97.9 °F (36.6 °C) (Temporal)   Resp 16   Ht 6' 4\" (1.93 m)   Wt 111 kg (245 lb 6 oz)   SpO2 96%   BMI 29.87 kg/m²     Physical Exam  Vitals and nursing note reviewed.   Constitutional:       General: He is not in acute distress.     Appearance: He is well-developed.   HENT:      Head: Normocephalic.      Nose: Nose normal.   Eyes:      Conjunctiva/sclera: Conjunctivae normal.   Cardiovascular:      Rate and Rhythm: Normal rate and regular rhythm.      Pulses: Normal pulses.   Pulmonary:      Effort: Pulmonary effort is normal.      Breath sounds: No wheezing.   Abdominal:      General: There is no distension.      Palpations: Abdomen is soft.   Musculoskeletal:         General: Swelling and tenderness present.      Cervical back: Neck supple.      Right lower leg: Edema present.      Comments: Right dorsiflexion weakness   Skin:     General: Skin is warm.      Comments: Incision medially and laterally with sutures  Scant drainage from medial area   Neurological:      Mental Status: He is alert and oriented to person, place, and time.      Sensory: Sensory deficit present.      Motor: Weakness present.      Gait: Gait abnormal.   Psychiatric:         Mood and Affect: Mood normal.          Lab Results   Component Value Date    WBC 12.98 (H) 07/22/2024    HGB 10.1 (L) 07/22/2024    HCT 31.3 (L) 07/22/2024    MCV 93 07/22/2024     07/22/2024     Lab Results   Component Value Date    SODIUM 140 07/22/2024    K 4.0 07/22/2024     07/22/2024    CO2 27 07/22/2024    BUN 17 07/22/2024    CREATININE 0.65 07/22/2024    GLUC 104 07/22/2024    CALCIUM 8.4 07/22/2024     Lab Results   Component Value Date    INR 1.08 07/01/2024    INR 1.09 08/15/2023    INR 1.04 08/04/2020    PROTIME 13.9 07/01/2024    PROTIME 13.9 08/15/2023    PROTIME 13.8 08/04/2020           Current Facility-Administered Medications:     acetaminophen (TYLENOL) tablet 975 mg, 975 mg, Oral, Q8H PRN, Meir Pace " MD Crescencio, 975 mg at 07/24/24 0546    ascorbic acid (VITAMIN C) tablet 500 mg, 500 mg, Oral, BID, Jessie Bailey PA-C, 500 mg at 07/24/24 0819    aspirin (ECOTRIN LOW STRENGTH) EC tablet 81 mg, 81 mg, Oral, BID, Jessie Bailey PA-C, 81 mg at 07/24/24 0819    atorvastatin (LIPITOR) tablet 40 mg, 40 mg, Oral, Daily With Dinner, Jessie Bailey PA-C, 40 mg at 07/23/24 1630    DAPTOmycin (CUBICIN) 575 mg in sodium chloride 0.9 % 50 mL IVPB, 6 mg/kg (Adjusted), Intravenous, Q24H, Laxmi Marks MD    docusate sodium (COLACE) capsule 100 mg, 100 mg, Oral, BID, Jessie Bailey PA-C, 100 mg at 07/21/24 1700    escitalopram (LEXAPRO) tablet 5 mg, 5 mg, Oral, Daily, Jessie Bailey PA-C, 5 mg at 07/24/24 0819    HYDROmorphone (DILAUDID) tablet 2 mg, 2 mg, Oral, Q4H PRN, Meir Prather MD, 2 mg at 07/23/24 2205    insulin glargine (LANTUS) subcutaneous injection 25 Units 0.25 mL, 25 Units, Subcutaneous, Q12H TESS, Jessie Bailey PA-C, 25 Units at 07/24/24 0819    insulin lispro (HumALOG/ADMELOG) 100 units/mL subcutaneous injection 1-6 Units, 1-6 Units, Subcutaneous, TID AC, 1 Units at 07/23/24 1630 **AND** Fingerstick Glucose (POCT), , , TID AC, Jessie Bailey PA-C    levothyroxine tablet 175 mcg, 175 mcg, Oral, Early Morning, Jessie Bailey PA-C, 175 mcg at 07/24/24 0544    losartan (COZAAR) tablet 25 mg, 25 mg, Oral, Daily, Jessie Bailey PA-C, 25 mg at 07/24/24 0819    ondansetron (ZOFRAN) injection 4 mg, 4 mg, Intravenous, Q6H PRN, Jessie Bailey PA-C    pantoprazole (PROTONIX) EC tablet 40 mg, 40 mg, Oral, Every Other Day, Jessie Bailey PA-C, 40 mg at 07/23/24 0852    senna (SENOKOT) tablet 8.6 mg, 1 tablet, Oral, BID PRN, Meir Prather MD    zolpidem (AMBIEN) tablet 10 mg, 10 mg, Oral, HS PRN, Jessie Bailey PA-C    Past Medical History:   Diagnosis Date    Broken internal right knee prosthesis (HCC)  01/2023    Chronic kidney disease     Colon polyp     Constipation 03/09/2023    Diabetes mellitus (HCC)     Disease of thyroid gland     GERD (gastroesophageal reflux disease)     HHS vs DKA 11/16/2018    Hyperlipidemia     Hypertension     Thyroid cancer ([facility])        Patient Active Problem List    Diagnosis Date Noted    Septic arthritis of knee ([facility]) 02/06/2024    Poor dentition 07/22/2024    Pain 07/22/2024    At risk for venous thromboembolism (VTE) 07/22/2024    Diabetic neuropathy ([facility]) 07/22/2024    Anemia 07/22/2024    Leukocytosis 07/22/2024    Chronic pain of right knee 07/10/2024    Type 2 diabetes mellitus without complication, with long-term current use of insulin ([facility]) 07/10/2024    S/P hardware removal 06/20/2023    MANNY (obstructive sleep apnea) 05/01/2023    Constipation 03/09/2023    Depression and anxiety 03/03/2023    Acquired genu varum of right lower extremity 02/21/2023    MARKELL (acute kidney injury) ([facility]) 02/14/2023    Chronic kidney disease-mineral and bone disorder 02/14/2023    Closed fracture of right tibial plateau 02/07/2023    Stage 3b chronic kidney disease ([facility]) 11/15/2022    Hyperkalemia 11/15/2022    Alcoholism ([facility]) 11/15/2022    Dupuytren's contracture of right hand 06/07/2022    Type 2 diabetes mellitus with diabetic peripheral angiopathy without gangrene ([facility]) 02/07/2022    Acquired hypothyroidism 10/18/2021    Acquired absence of right foot ([facility]) 03/29/2021    Persistent proteinuria 01/12/2021    Abnormal EKG 12/03/2020    Insomnia 12/03/2020    Orthostasis 10/08/2020    Urinary retention 09/08/2020    Thyroid cancer ([facility]) 08/20/2020    PAD (peripheral artery disease) ([facility]) 08/05/2020    Elevated liver enzymes 10/24/2019    Colon polyps 09/12/2019    Right-sided tinnitus 11/26/2018    Hypertension, essential 09/24/2018    Gastroesophageal reflux disease without esophagitis 09/24/2018    Hyperlipidemia 12/17/2009    History of nonadherence to medical treatment 02/20/2008           Laxmi Marks MD    I have spent a total time of 60 minutes on 07/24/24 in caring for this patient including Impressions, Counseling / Coordination of care, Documenting in the medical record, Reviewing / ordering tests, medicine, procedures  , Obtaining or reviewing history  , Communicating with other healthcare professionals , and weekly team conference .      ** Please Note:  voice to text software may have been used in the creation of this document. Although proof errors in transcription or interpretation are a potential of such software**

## 2024-07-24 NOTE — TEAM CONFERENCE
Acute RehabilitationTeam Conference Note  Date: 7/24/2024   Time: 10:48 AM       Patient Name:  Aaron Richards       Medical Record Number: [id number]   YOB: 1965  Sex: Male          Room/Bed:  [facility] 215/[facility] 215-01  Payor Info:  Payor: MEDICARE / Plan: MEDICARE A AND B / Product Type: Medicare A & B Fee for Service /      Admitting Diagnosis: Osteoarthritis [M19.90]   Admit Date/Time:  7/21/2024  2:15 PM  Admission Comments: No comment available     Primary Diagnosis:  Septic arthritis of knee (Formerly [facility])  Principal Problem: Septic arthritis of knee (Formerly [facility])    Patient Active Problem List    Diagnosis Date Noted    Poor dentition 07/22/2024    Pain 07/22/2024    At risk for venous thromboembolism (VTE) 07/22/2024    Diabetic neuropathy (Formerly [facility]) 07/22/2024    Anemia 07/22/2024    Leukocytosis 07/22/2024    Chronic pain of right knee 07/10/2024    Type 2 diabetes mellitus without complication, with long-term current use of insulin (Formerly [facility]) 07/10/2024    Septic arthritis of knee (Formerly [facility]) 02/06/2024    S/P hardware removal 06/20/2023    MANNY (obstructive sleep apnea) 05/01/2023    Constipation 03/09/2023    Depression and anxiety 03/03/2023    Acquired genu varum of right lower extremity 02/21/2023    MARKELL (acute kidney injury) (Formerly [facility]) 02/14/2023    Chronic kidney disease-mineral and bone disorder 02/14/2023    Closed fracture of right tibial plateau 02/07/2023    Stage 3b chronic kidney disease (Formerly [facility]) 11/15/2022    Hyperkalemia 11/15/2022    Alcoholism (Formerly [facility]) 11/15/2022    Dupuytren's contracture of right hand 06/07/2022    Type 2 diabetes mellitus with diabetic peripheral angiopathy without gangrene (Formerly [facility]) 02/07/2022    Acquired hypothyroidism 10/18/2021    Acquired absence of right foot (Formerly [facility]) 03/29/2021    Persistent proteinuria 01/12/2021    Abnormal EKG 12/03/2020    Insomnia 12/03/2020    Urinary retention 09/08/2020    Thyroid cancer (Former[facility]) 08/20/2020    PAD (peripheral artery disease) (Formerly [facility]) 08/05/2020    Elevated liver  enzymes 10/24/2019    Colon polyps 09/12/2019    Right-sided tinnitus 11/26/2018    Hypertension, essential 09/24/2018    Gastroesophageal reflux disease without esophagitis 09/24/2018    Hyperlipidemia 12/17/2009    History of nonadherence to medical treatment 02/20/2008       Physical Therapy:    Weight Bearing Status: Weight Bearing as Tolerated  Transfers: Incidental Touching, Minimal Assistance (cg STS with RW; CG/min A SPT with walker)  Bed Mobility: Independent  Amulation Distance (ft): 50 feet  Ambulation: Minimal Assistance  Assistive Device for Ambulation: Roller Walker  Wheelchair Mobility Distance: 150 ft  Wheelchair Mobility: Independent  Number of Stairs:  (TBA)  Assistive Device for Stairs:  (TBA)  Stair Assistance:  (TBA)  Ramp:  (TBA)  Assistive Device for Ramp:  (TBA)  Discharge Recommendations: Home with:  DC Home with:: Family Support, First Floor Setup, Home Physical Therapy, Outpatient Physical Therapy    07/24/2024:  Patient recent admission to unit.    Patient participating in therapy and making positive gains.   Patient MOD I Bed mobility, CG STS with walker, CG/MIN A SPT with walker, MIN A ambulation up to 50' with walker and wheelchair follow, MOD I wheelchair mobility level and unlevel surfaces.  Ramp and steps not assessed.  Patient limited by decreased ROM/strength, decreased balance and safety, decreased endurance, pain, and orthostatic hypotension.  Patient would benefit from continued inpatient ARC PT to increase function, safety, and independence in prep for safe d/c to home with family and continued PT services as needed.    Occupational Therapy:  Eating: Independent  Grooming: Supervision  Bathing: Supervision  Bathing: Supervision  Upper Body Dressing: Supervision  Lower Body Dressing: Supervision  Toileting: Supervision  Tub/Shower Transfer:  (TBA)  Toilet Transfer: Supervision, Incidental Touching  Cognition: Within Defined Limits  Orientation: Person, Place, Time,  Situation  Discharge Recommendations: Home with:  DC Home with:: Family Support, First Floor Setup, Home Occupational Therapy, Outpatient Occupational Therapy (outpatient vs. home health)       7/22/24: Pt evaluated at ARU 7/22/24; LOF listed above. Pt presents motivated and eager to improve during ARU stay. Barriers to d/c include generalized weakness, impaired balance, pain, decreased endurance, and decreased activity tolerance; all affecting self care and fxl transfers. Pt would benefit from continued skilled OT services to improve independence in all daily tasks.     Speech Therapy:           No notes on file    Nursing Notes:  Appetite: Good  Diet Type: Regular/House                      Diet Patient/Family Education Complete: Yes    Type of Wound (LDA): Wound (right leg incision)                    Type of Wound Patient/Family Education: Yes (rle incision)  Bladder: Continent     Bladder Patient/Family Education: Yes  Bowel: Medication     Bowel Patient/Family Education: Yes  Pain Location/Orientation: Orientation: Right, Location: Knee  Pain Score: 7                       Hospital Pain Intervention(s): Medication (See MAR), Shower/Bath, Rest  Pain Patient/Family Education: Yes  Medication Management/Safety  Injectable: Insulin  Medication Patient/Family Education Complete: Yes    7/22/24 Patient is a recent admission to [facility] following knee surgery for a septic arthritis of the right knee.  Patient remains alert and oriented.  Lungs are clear on room air.  Bilateral lower extremity trace edema noted.  Right knee incision with sutures in place twice a day dressing changes to be completed by nursing.  Patient is on IV antibiotics through a right upper extremity PICC line.  Blood sugars are being monitored four times a day as ordered.  Patient states he is comfortable self injecting his long acting insulin as he was taking this prior to admission.  Orthostatic blood pressure readings are being checked as ordered  for three days.  Patient was able to transfer with one assist.  Pain controlled with scheduled Tylenol and as needed Dilaudid.     Case Management:     Discharge Planning  Living Arrangements: Lives w/ Spouse/significant other  Support Systems: Spouse/significant other  Assistance Needed: unknown  Type of Current Residence: Private residence  Current Home Care Services: No  Patient admitted to [facility] on 7/21 from inpatient hospital stay septic arthritis,7/15  had right abx cement TKA for infected tibial plateau non union with lateral sinus tract. Patient lives with spouse in ranch home with 4STE. Family transports. Patient has PICC.IV abx course until 8/26    Is the patient actively participating in therapies? yes  List any modifications to the treatment plan: na    Barriers Interventions   Septic arthritis right knee, pain, orthostatic, DM, right foot drop, elevated WBC IV antibx - PICC line, patient education, medication management, medical management and oversight, AFO, uses pen at home for DM meds   Decreased ROM and strength Therapeutic exercise, therapeutic activity   Decreased balance, end ADL, transfer, gait training   Right knee incision Local care, daily dressing changes         Is the patient making expected progress toward goals? yes  List any update or changes to goals: na    Medical Goals: Patient will be able to manage medical conditions and comorbid conditions with medications and follow up upon completion of rehab program    Weekly Team Goals:   Rehab Team Goals  ADL Team Goal: Patient will return to premorbid level for ADLs upon completion of rehab program  Bowel/Bladder Team Goal: Patient will be independent with bladder/bowel management with least restrictive device upon completion of rehab program  Transfer Team Goal: Patient will be independent with transfers with least restrictive device upon completion of rehab program  Locomotion Team Goal: Patient will be independent with locomotion with  least restrictive device upon completion of rehab program    Mod I bed mobility, transfers, and mobility  Mod I self care    Health and wellness: to be able to return home and walk into shop to get a tattoo    Discussion: Plan for return home with wife with HHC for IV antibx with nursing, PT, and OT with progression to outpatient PT    Anticipated Discharge Date:  Aug 1, 2024  [facility] Team Members Present:  The following team members are supervising care for this patient and were present during this Weekly Team Conference.    MD Angela Mcmanus, BONI Rodriguez, BONI and BONI Bunn, PT  Yulia Storm, OTR/L and Octavia Alarcon OTR/L

## 2024-07-24 NOTE — PROGRESS NOTES
07/24/24 1230   Pain Assessment   Pain Assessment Tool 0-10   Pain Score 8   Pain Location/Orientation Orientation: Right;Location: Knee   Restrictions/Precautions   Precautions Bed/chair alarms;Fall Risk;Limb alert;Pain;Supervision on toilet/commode   RLE Weight Bearing Per Order WBAT   Cognition   Overall Cognitive Status WFL   Orientation Level Oriented X4   Roll Left and Right   Type of Assistance Needed Independent   Roll Left and Right CARE Score 6   Sit to Lying   Type of Assistance Needed Independent   Sit to Lying CARE Score 6   Lying to Sitting on Side of Bed   Type of Assistance Needed Independent   Lying to Sitting on Side of Bed CARE Score 6   Sit to Stand   Type of Assistance Needed Independent   Comment RW   Sit to Stand CARE Score 6   Walk 10 Feet   Type of Assistance Needed Incidental touching;Verbal cues   Physical Assistance Level Total assistance   Comment CG level surfaces with RW with chair follow due to orthostasis concerns; increased pain and fatigue; no complaints of feeling lightheaded.   Walk 10 Feet CARE Score 1   Walk 50 Feet with Two Turns   Type of Assistance Needed Incidental touching;Verbal cues   Physical Assistance Level Total assistance   Comment CG level surfaces with RW with chair follow due to orthostasis concerns; increased pain and fatigue; no complaints of feeling lightheaded.   Walk 50 Feet with Two Turns CARE Score 1   Ambulation   Primary Mode of Locomotion Prior to Admission Walk   Distance Walked (feet) 64 ft  (64' 57')   Assist Device Roller Walker   Gait Pattern Antalgic;Slow Lisa;Decreased foot clearance;Forward Flexion;Narrow YUMIKO;Step to;Step through;Decreased R stance;Improper weight shift   Limitations Noted In Balance;Endurance;Safety;Strength   Provided Assistance with: Balance   Walk Assist Level Contact Guard   Findings CG level surfaces with RW with chair follow due to orthostasis concerns; increased pain and fatigue; no complaints of feeling  lightheaded.   Does the patient walk? 2. Yes   Therapeutic Interventions   Strengthening standing ther ex 2 x 15 wtih rest periods   Balance gait and transfer training with RW   Assessment   Treatment Assessment Patient agreeable to therapy session.   Pain in R knee 8/10.   BP sitting with automatic cuff 126/65, however unable get BP in standing and mild-moderate c/o feeling lightheaded.  Using manual cuff sitting BP was 122/78 and standing was 86/58 with mild-moderate c/o feeling lightheaded.  Rechecked after15 min and seated BP was 102/62 and 78/56 standing with mild-moderate complaints of feeling lightheaded.   No c/o of feeling lightheaded with ambulation, howerve, does fatigue quickly and c/o pain in R knee.  Declines use of abdominal binder as he states he doesn't feel much different with it on or off.  Completed ther ex for general LE strengthening; gait and transfer training focusing on sequence and technique for improved balance and safety with functional mobility using walker.   Problem List Decreased strength;Decreased range of motion;Decreased endurance;Impaired balance;Decreased mobility;Decreased safety awareness;Pain;Decreased skin integrity   PT Barriers   Physical Impairment Decreased strength;Decreased range of motion;Decreased endurance;Impaired balance;Decreased safety awareness;Impaired sensation;Orthopedic restrictions;Pain   Functional Limitation Walking;Transfers;Standing;Stair negotiation;Ramp negotiation;Car transfers   Plan   Treatment/Interventions Functional transfer training;LE strengthening/ROM;Therapeutic exercise;Gait training   Progress Progressing toward goals   PT Therapy Minutes   PT Time In 1230   PT Time Out 1330   PT Total Time (minutes) 60   PT Mode of treatment - Individual (minutes) 60   PT Mode of treatment - Concurrent (minutes) 0   PT Mode of treatment - Group (minutes) 0   PT Mode of treatment - Co-treat (minutes) 0   PT Mode of Treatment - Total time(minutes) 60  minutes   PT Cumulative Minutes 304   Therapy Time missed   Time missed? No

## 2024-07-24 NOTE — PROGRESS NOTES
07/24/24 1100   Pain Assessment   Pain Assessment Tool 0-10   Pain Score 7   Pain Location/Orientation Orientation: Right;Location: Knee   Restrictions/Precautions   Precautions Bed/chair alarms;Fall Risk;Limb alert;Pain;Supervision on toilet/commode   RLE Weight Bearing Per Order WBAT   Cognition   Overall Cognitive Status WFL   Subjective   Subjective Reports feeling better, did have a lot of pain last night.  Took a pain pill even though I wasn't really wanting to take them.   Roll Left and Right   Type of Assistance Needed Independent   Physical Assistance Level No physical assistance   Roll Left and Right CARE Score 6   Sit to Lying   Type of Assistance Needed Independent   Physical Assistance Level No physical assistance   Sit to Lying CARE Score 6   Lying to Sitting on Side of Bed   Type of Assistance Needed Independent   Physical Assistance Level No physical assistance   Lying to Sitting on Side of Bed CARE Score 6   Sit to Stand   Type of Assistance Needed Independent   Physical Assistance Level No physical assistance   Sit to Stand CARE Score 6   Bed-Chair Transfer   Type of Assistance Needed Set-up / clean-up   Physical Assistance Level No physical assistance   Chair/Bed-to-Chair Transfer CARE Score 5   Car Transfer   Reason if not Attempted Environmental limitations   Car Transfer CARE Score 10   Ambulation   Findings Deferred a.m. session to TE due to fatigue from a.m. shower, focus on ambulation afternoon session   Wheel 50 Feet with Two Turns   Type of Assistance Needed Independent   Physical Assistance Level No physical assistance   Wheel 50 Feet with Two Turns CARE Score 6   Wheel 150 Feet   Type of Assistance Needed Independent   Physical Assistance Level No physical assistance   Wheel 150 Feet CARE Score 6   Wheelchair mobility   Does the patient use a wheelchair? 1. Yes   Type of Wheelchair Used 1. Manual   Method Right upper extremity;Left upper extremity;Right lower extremity;Left lower  extremity   Therapeutic Interventions   Strengthening Supine LE TE, 2.5# on left LE, shoes on   Assessment   Treatment Assessment Patient presents supine in bed, NAD.  He continues to progress with LE strength, able to tolerate progression to 2.5# on left.  Remains motivated to improve.  Cont per POC and progress as tolerated.   Problem List Decreased strength;Decreased range of motion;Decreased endurance;Impaired balance;Decreased mobility;Decreased safety awareness;Pain;Decreased skin integrity   PT Barriers   Physical Impairment Decreased strength;Decreased range of motion;Decreased endurance;Impaired balance;Decreased safety awareness;Impaired sensation;Orthopedic restrictions;Pain   Functional Limitation Walking;Transfers;Standing;Stair negotiation;Ramp negotiation;Car transfers   Plan   Treatment/Interventions Functional transfer training;LE strengthening/ROM;Elevations;Therapeutic exercise;Endurance training;Patient/family training;Equipment eval/education;Bed mobility;Gait training   Progress Progressing toward goals   PT Therapy Minutes   PT Time In 1100   PT Time Out 1200   PT Total Time (minutes) 60   PT Mode of treatment - Individual (minutes) 60   PT Mode of treatment - Concurrent (minutes) 0   PT Mode of treatment - Group (minutes) 0   PT Mode of treatment - Co-treat (minutes) 0   PT Mode of Treatment - Total time(minutes) 60 minutes   PT Cumulative Minutes 244       Patient remains OOB in chair, all needs in reach.  Alarm in place and activated.  Encouraged use of call bell, patient verbalizes understanding.

## 2024-07-24 NOTE — PLAN OF CARE
Problem: PAIN - ADULT  Goal: Verbalizes/displays adequate comfort level or baseline comfort level  Description: Interventions:  - Encourage patient to monitor pain and request assistance  - Assess pain using appropriate pain scale  - Administer analgesics based on type and severity of pain and evaluate response  - Implement non-pharmacological measures as appropriate and evaluate response  - Consider cultural and social influences on pain and pain management  - Notify physician/advanced practitioner if interventions unsuccessful or patient reports new pain  Outcome: Progressing     Problem: INFECTION - ADULT  Goal: Absence or prevention of progression during hospitalization  Description: INTERVENTIONS:  - Assess and monitor for signs and symptoms of infection  - Monitor lab/diagnostic results  - Monitor all insertion sites, i.e. indwelling lines, tubes, and drains  - Monitor endotracheal if appropriate and nasal secretions for changes in amount and color  - [place] appropriate cooling/warming therapies per order  - Administer medications as ordered  - Instruct and encourage patient and family to use good hand hygiene technique  - Identify and instruct in appropriate isolation precautions for identified infection/condition  Outcome: Progressing     Problem: SAFETY ADULT  Goal: Patient will remain free of falls  Description: INTERVENTIONS:  - Educate patient/family on patient safety including physical limitations  - Instruct patient to call for assistance with activity   - Consult OT/PT to assist with strengthening/mobility   - Keep Call bell within reach  - Keep bed low and locked with side rails adjusted as appropriate  - Keep care items and personal belongings within reach  - Initiate and maintain comfort rounds  - Make Fall Risk Sign visible to staff  - Offer Toileting every 2 Hours, in advance of need  - Initiate/Maintain bed/chair alarm- Apply yellow socks and bracelet for high fall risk patients  - Consider  moving patient to room near nurses station  Outcome: Progressing     Problem: Prexisting or High Potential for Compromised Skin Integrity  Goal: Skin integrity is maintained or improved  Description: INTERVENTIONS:  - Identify patients at risk for skin breakdown  - Assess and monitor skin integrity  - Assess and monitor nutrition and hydration status  - Monitor labs   - Assess for incontinence   - Turn and reposition patient  - Assist with mobility/ambulation  - Relieve pressure over bony prominences  - Avoid friction and shearing  - Provide appropriate hygiene as needed including keeping skin clean and dry  - Evaluate need for skin moisturizer/barrier cream  - Collaborate with interdisciplinary team   - Patient/family teaching  - Consider wound care consult   Outcome: Progressing

## 2024-07-24 NOTE — ASSESSMENT & PLAN NOTE
Acute orthostasis upon arrival to [facility]  Abdominal binder and compression stockings as needed during therapies  Adequate hydration  Internal medicine did discontinue Losartan 7/25/24 7/27 & 7/28 developed acute N/V and diarrhea possible acute gastroenteritis with continued orthostasis.  Received IVF x 2 7/28 & 7/29  Much improved BPs and HRs 7/30/24

## 2024-07-24 NOTE — CASE MANAGEMENT
CM called [facility] infusion , spoke with Darlene, inquired if patient able to come to infusion center for once daily IV abx. Darlene stated yes , that can be scheduled. ON weekends, [place] and [facility] alternate being opened for infusions.  CM met with patient, reviewed team meeting information, including plan for d/c on 8/1. Patient stated he has spoken with his wife, and they are prepared for homecare with nursing and therapies. CM relayed information received from infusion center, patient stating he would prefer home care , then transition to outpt therapies after IV abx course is completed 8/26. Patient stated he has worked with [facility] home care and prefers to use them again. CM sent referral to [facility] and [facility] infusion through [employer] via [employer], awaiting responses.

## 2024-07-24 NOTE — NURSING NOTE
3rd set of orthostatic bp completed sitting to standing position- with PT. Manual bp 126/64 sitting- dropped to 86/58 standing. Patient denied feeling lightheaded or dizzy. Therapy applied the abdominal brace after the checking orthostatics. PM+R and medicine notified. Will continue to monitor.

## 2024-07-25 LAB
GLUCOSE SERPL-MCNC: 141 MG/DL (ref 65–140)
GLUCOSE SERPL-MCNC: 142 MG/DL (ref 65–140)
GLUCOSE SERPL-MCNC: 195 MG/DL (ref 65–140)
GLUCOSE SERPL-MCNC: 214 MG/DL (ref 65–140)

## 2024-07-25 PROCEDURE — 97542 WHEELCHAIR MNGMENT TRAINING: CPT

## 2024-07-25 PROCEDURE — 99233 SBSQ HOSP IP/OBS HIGH 50: CPT | Performed by: PHYSICAL MEDICINE & REHABILITATION

## 2024-07-25 PROCEDURE — 97530 THERAPEUTIC ACTIVITIES: CPT

## 2024-07-25 PROCEDURE — 97116 GAIT TRAINING THERAPY: CPT

## 2024-07-25 PROCEDURE — 82948 REAGENT STRIP/BLOOD GLUCOSE: CPT

## 2024-07-25 PROCEDURE — 97535 SELF CARE MNGMENT TRAINING: CPT

## 2024-07-25 PROCEDURE — 97110 THERAPEUTIC EXERCISES: CPT

## 2024-07-25 RX ORDER — DOCUSATE SODIUM 100 MG/1
100 CAPSULE, LIQUID FILLED ORAL 3 TIMES DAILY PRN
Status: DISCONTINUED | OUTPATIENT
Start: 2024-07-25 | End: 2024-08-01 | Stop reason: HOSPADM

## 2024-07-25 RX ADMIN — ATORVASTATIN CALCIUM 40 MG: 40 TABLET, FILM COATED ORAL at 17:18

## 2024-07-25 RX ADMIN — LOSARTAN POTASSIUM 25 MG: 25 TABLET, FILM COATED ORAL at 08:19

## 2024-07-25 RX ADMIN — DAPTOMYCIN 575 MG: 500 INJECTION, POWDER, LYOPHILIZED, FOR SOLUTION INTRAVENOUS at 16:21

## 2024-07-25 RX ADMIN — INSULIN GLARGINE 25 UNITS: 100 INJECTION, SOLUTION SUBCUTANEOUS at 20:44

## 2024-07-25 RX ADMIN — ESCITALOPRAM OXALATE 5 MG: 5 TABLET, FILM COATED ORAL at 08:19

## 2024-07-25 RX ADMIN — INSULIN GLARGINE 25 UNITS: 100 INJECTION, SOLUTION SUBCUTANEOUS at 08:19

## 2024-07-25 RX ADMIN — OXYCODONE HYDROCHLORIDE AND ACETAMINOPHEN 500 MG: 500 TABLET ORAL at 08:19

## 2024-07-25 RX ADMIN — ASPIRIN 81 MG: 81 TABLET, COATED ORAL at 17:18

## 2024-07-25 RX ADMIN — OXYCODONE HYDROCHLORIDE AND ACETAMINOPHEN 500 MG: 500 TABLET ORAL at 17:19

## 2024-07-25 RX ADMIN — ACETAMINOPHEN 975 MG: 325 TABLET ORAL at 16:30

## 2024-07-25 RX ADMIN — INSULIN LISPRO 2 UNITS: 100 INJECTION, SOLUTION INTRAVENOUS; SUBCUTANEOUS at 11:56

## 2024-07-25 RX ADMIN — LEVOTHYROXINE SODIUM 175 MCG: 100 TABLET ORAL at 05:43

## 2024-07-25 RX ADMIN — PANTOPRAZOLE SODIUM 40 MG: 40 TABLET, DELAYED RELEASE ORAL at 08:19

## 2024-07-25 RX ADMIN — ASPIRIN 81 MG: 81 TABLET, COATED ORAL at 08:19

## 2024-07-25 NOTE — PROGRESS NOTES
"Progress Note - Aaron Richards 59 y.o. male MRN: [id number]    Unit/Bed#: -01 Encounter: [id number]        Subjective:   Patient seen and examined at bedside after reviewing the chart and discussing the case with the caring staff.      Patient examined at bedside.  Patient reports no acute symptoms.    Physical Exam   Vitals: Blood pressure 113/65, pulse 90, temperature (!) 97.4 °F (36.3 °C), temperature source Temporal, resp. rate 18, height 6' 4\" (1.93 m), weight 111 kg (245 lb 6 oz), SpO2 96%.,Body mass index is 29.87 kg/m².  Constitutional: Patient in no acute distress.  HEENT: PERR, EOMI, MMM.  Cardiovascular: Normal rate and regular rhythm.    Pulmonary/Chest: Effort normal and breath sounds normal.   Abdomen: Soft, + BS, NT.    Assessment/Plan:  Aaron Richards is a(n) 59 y.o. 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  Continue ASA 81 mg twice daily (for 4 weeks for ortho DVT ppx, then back to daily) and atorvastatin 40 mg daily.  Home: irbesartan/hydrochlorothiazide 150-12.5 mg daily.  Discontinued all BP medication due to orthostasis.  Abdominal binder.   DM2.  Hgb A1c 6.2%.  Continue Lantus 25 units twice daily.  SSI w/ POCT AS HS.  Home: Lantus 30 units BID and Apidra SSI.   Acquired hypothyroidism. Hx thyroid cancer s/p thyroidectomy.   Continue levothyroxine 175 mcg daily.   GERD.  Continue Protonix 40 mg daily.   CKD3b.  Stable.  Cr 0.65 on 7/22.  Avoid nephrotoxins.   MDD/anxiety.  Continue Lexapro 20 mg daily.   Hx of alcohol use.  Stable.  Sober since 2023.   Insomnia.  Continue Ambien 10 mg HS as needed.   Pain and bowel regimen.  As per physiatry.   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  Continue daptomycin 6 mg/kg IV every 24 hours through 8/26.  Check CPK weekly.  Patient receiving intensive PT OT as per physiatry.     Anticipated date of discharge.  Thursday 8/1/24.    The patient was discussed with Dr." Duane and he is in agreement with the above note.

## 2024-07-25 NOTE — PROGRESS NOTES
PM&R PROGRESS NOTE:  Aaron Richards 59 y.o. male MRN: [id number]  Unit/Bed#: -01 Encounter: [id number]        Rehab Diagnosis: Orthopedic Disorders:  08.61  Unilateral Knee Replacement  Etiologic Diagnosis:   s/p R antibiotic cement TKA with Dr. 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 Prather: 59 year old M with PMH of HTN, PAD, R TMA, HLD, DM2, CKD, GERD, diabetic neuropathy who had history of traumatic HW failure requiring removal of HW and later developed sinus track extending into jt space with op finding of septic arthritis who underwent complicated R hand crafted abx cement TKA with augmentation by Dr Hendrix on 7/15. ID consulted and recommended IV Dapto for 6 week course with closely monitoring labs. Post-op course notable for pain, leukocytosis, anemia, and significant decline in ADLs and mobility.     SUBJECTIVE: Patient seen face to face.  No acute issues overnight.  Remains afebrile and no increased pain.  Happy with his progress in PT.  Drainage has been decreasing.      ASSESSMENT: Stable, progressing      PLAN:    Rehabilitation  Continue current rehabilitation plan of care to maximize function.    Estimated Discharge: Thursday, 8/1/2024 with home health care RN, PT, OT      Current Function:  Physical Therapy Occupational Therapy Speech Therapy   Weight Bearing Status: Weight Bearing as Tolerated  Transfers: Incidental Touching, Minimal Assistance (cg STS with RW; CG/min A SPT with walker)  Bed Mobility: Independent  Amulation Distance (ft): 50 feet  Ambulation: Minimal Assistance  Assistive Device for Ambulation: Roller Walker  Wheelchair Mobility Distance: 150 ft  Wheelchair Mobility: Independent  Number of Stairs:  (TBA)  Assistive Device for Stairs:  (TBA)  Stair Assistance:  (TBA)  Ramp:  (TBA)  Assistive Device for Ramp:  (TBA)  Discharge Recommendations: Home with:  DC Home with:: Family Support, First Floor Setup, Home  Physical Therapy, Outpatient Physical Therapy   Eating: Independent  Grooming: Supervision  Bathing: Supervision  Bathing: Supervision  Upper Body Dressing: Supervision  Lower Body Dressing: Supervision  Toileting: Supervision  Tub/Shower Transfer:  (TBA)  Toilet Transfer: Supervision, Incidental Touching  Cognition: Within Defined Limits  Orientation: Person, Place, Time, Situation                 DVT prophylaxis  Continue aspirin 81 mg twice daily-per orthopedics    Bladder plan  Continent    Bowel plan  Continent      * Septic arthritis of knee (HCC)  Assessment & Plan  Prior traumatic HW failure requiring HW removal and then developed sinus tract extending into jt space with op finding c/w septic arthritis per last ID note  - S/p complicated right hand crafted antibiotic cement total knee arthroplasty with augmentation by ortho, Dr Hendrix on 7/15  Weight-bearing as tolerated on affected limb; ROM as tolerated, pillow/blankets under achilles not behind knee while in bed  Comgmt by IM during ARC course   Daptomycin IV through 8/26 for 6 week course - Monitor labs (CBC/BMP and CK weekly) while on Abx; remove PICC after IV abx   7/22 - Stable mild leukocytosis - 12K, BMP unremarkable, CK wnl  Monitor incision/area for infection or dehiscence/impaired healing - decreased serosang drainage, no clear signs of infection again  generalized knee edema stable - pt feels it is improving   Recommend wound care consult - pending   In interim, Change dressing with DSD BID and PRN if soiled to avoid maceration - hopefully can decrease when improves.  Notify MD of increased drainage, pus, redness, or breakdown of incision   POD#14 is Mon 7/29  Monitor for signs/symptoms of VTE, atelectasis, acute blood loss anemia, or other infection   Function improving   Continue acute comprehensive interdisciplinary inpatient rehabilitation to include intensive skilled therapies (PT, OT) as outlined with oversight and management by  rehabilitation physician as well as inpatient rehab level nursing, case management and weekly interdisciplinary team meetings.   Follow-up with Ortho Sx after d/c and ortho if needed during ARC course     7/24/24      7/15/24        Leukocytosis  Assessment & Plan  Stable at 12K on 7/22   Comgmt with IM team  - Monitor closely  - On IV abx per ortho  - Monitor exam, labs    Anemia  Assessment & Plan  Hb 10.1 on 7/22  Consult(ed) IM and comanagement with their service during ARC course   Monitor H/H, vitals, signs/symptoms of acute bleeding      Diabetic neuropathy (HCC)  Assessment & Plan  Optimal DM mgmt  Monitor skin for increased infection/breakdown/wounds which patient is at higher risk for  Skilled PT/OT   Fall precautions      At risk for venous thromboembolism (VTE)  Assessment & Plan  SCDs, ambulation, and aspirin 81mg BID per ortho       Pain  Assessment & Plan  Controlled   Ice PRN   APAP 975mg TID   Hydromorphone 2mg Q4H PRN (pt felt 4mg was too strong)   Monitor for oversedation, AMS/delirium, and respiratory depression   If being administered - hold opiates, muscle relaxants, benzodiazepines, and gabapentin for oversedation, AMS, or RR<12.  Counseled on and continue to encourage deep breathing/relaxation/behavioral pain management techniques      Poor dentition  Assessment & Plan  ID recommends OMFS eval for possible extractions - this was not obtained on acute PTA per prior providers - will recommend OP follow-up unless concerns in interim - discussed with pt   - Mgmt per IM here     Depression and anxiety  Assessment & Plan  Lexapro   Supportive counseling  Counseled on and continue to encourage deep breathing/relaxation/behavioral management techniques      Type 2 diabetes mellitus with diabetic peripheral angiopathy without gangrene (HCC)  Assessment & Plan  Lab Results   Component Value Date    HGBA1C 6.2 (H) 07/01/2024     - Current management by internal medicine  - Monitor for signs and symptoms  "of hypoglycemia   - Current meds: Lantus, Lispro per IM   - Consistent carb diabetic diet  - Recommend close monitoring and optimal management during recovery/rehab phase as well       Orthostasis  Assessment & Plan  Acute orthostasis upon arrival to [facility]  Abdominal binder and compression stockings as needed during therapies  Adequate hydration  Internal medicine did discontinue Losartan 7/25/24    PAD (peripheral artery disease) ([facility])  Assessment & Plan  Hx of peripheral arterial disease, with previous right popliteal artery stent, and subsequent TMA   - Overall management per hospitalist during acute course  - Antithrombotic: currently on aspiring 81mg BID (also for VTE proph) per prior providers   - Statin  - Optimal blood pressure and blood sugar control  (Was recommended asp and plavix when last seen by [facility] 4/2023-unclear why not on this)  - OP [facility] sx follow-up         Hyperlipidemia  Assessment & Plan  Statin     Gastroesophageal reflux disease without esophagitis  Assessment & Plan  Protonix 40 mg every other day    Hypertension, essential  Assessment & Plan  At home: Losartan 25 mg daily  Here: None - due to orthostasis discontinued on 7/25/24  Internal medicine consulted and co-management with their service  Monitor vitals with and without activity; monitor for orthostasis  Monitor hemoglobin, electrolytes, kidney function, hydration status       Labs, medications, and imaging personally reviewed 07/25/24.      ROS:  A ten point review of systems was completed on 07/25/24  and pertinent positives are listed in subjective section. All other systems reviewed were negative.     OBJECTIVE:   /65 (BP Location: Left arm)   Pulse 90   Temp (!) 97.4 °F (36.3 °C) (Temporal)   Resp 18   Ht 6' 4\" (1.93 m)   Wt 111 kg (245 lb 6 oz)   SpO2 96%   BMI 29.87 kg/m²     Physical Exam  Vitals and nursing note reviewed.   Constitutional:       General: He is not in acute distress.     Appearance: He is " well-developed.   HENT:      Head: Normocephalic.      Nose: Nose normal.   Eyes:      Conjunctiva/sclera: Conjunctivae normal.   Cardiovascular:      Rate and Rhythm: Normal rate and regular rhythm.      Pulses: Normal pulses.   Pulmonary:      Effort: Pulmonary effort is normal.      Breath sounds: Normal breath sounds. No wheezing.   Abdominal:      General: Bowel sounds are normal. There is no distension.      Palpations: Abdomen is soft.      Tenderness: There is no abdominal tenderness.   Musculoskeletal:         General: Swelling and tenderness present.      Cervical back: Neck supple.      Right lower leg: Edema present.      Comments: Right dorsiflexion weakness   Skin:     General: Skin is warm.      Comments: Incision medially and laterally with sutures   Scant drainage from lateral large incision  +superficial edema near incision   Neurological:      Mental Status: He is alert and oriented to person, place, and time.      Sensory: Sensory deficit present.      Motor: Weakness present.      Gait: Gait abnormal.   Psychiatric:         Mood and Affect: Mood normal.          Lab Results   Component Value Date    WBC 12.98 (H) 07/22/2024    HGB 10.1 (L) 07/22/2024    HCT 31.3 (L) 07/22/2024    MCV 93 07/22/2024     07/22/2024     Lab Results   Component Value Date    SODIUM 140 07/22/2024    K 4.0 07/22/2024     07/22/2024    CO2 27 07/22/2024    BUN 17 07/22/2024    CREATININE 0.65 07/22/2024    GLUC 104 07/22/2024    CALCIUM 8.4 07/22/2024     Lab Results   Component Value Date    INR 1.08 07/01/2024    INR 1.09 08/15/2023    INR 1.04 08/04/2020    PROTIME 13.9 07/01/2024    PROTIME 13.9 08/15/2023    PROTIME 13.8 08/04/2020           Current Facility-Administered Medications:     acetaminophen (TYLENOL) tablet 975 mg, 975 mg, Oral, Q8H PRN, Meir Prather MD, 975 mg at 07/24/24 0546    ascorbic acid (VITAMIN C) tablet 500 mg, 500 mg, Oral, BID, Jessie Bailey PA-C, 500 mg at  07/25/24 0819    aspirin (ECOTRIN LOW STRENGTH) EC tablet 81 mg, 81 mg, Oral, BID, Jessie Bailey PA-C, 81 mg at 07/25/24 0819    atorvastatin (LIPITOR) tablet 40 mg, 40 mg, Oral, Daily With Dinner, Jessie Bailey PA-C, 40 mg at 07/24/24 1714    DAPTOmycin (CUBICIN) 575 mg in sodium chloride 0.9 % 50 mL IVPB, 6 mg/kg (Adjusted), Intravenous, Q24H, Laxmi Marks MD, Last Rate: 100 mL/hr at 07/24/24 1627, 575 mg at 07/24/24 1627    docusate sodium (COLACE) capsule 100 mg, 100 mg, Oral, TID PRN, Laxmi Marks MD    escitalopram (LEXAPRO) tablet 5 mg, 5 mg, Oral, Daily, Jessie Bailey PA-C, 5 mg at 07/25/24 0819    HYDROmorphone (DILAUDID) tablet 2 mg, 2 mg, Oral, Q4H PRN, Meir Prather MD, 2 mg at 07/23/24 2205    insulin glargine (LANTUS) subcutaneous injection 25 Units 0.25 mL, 25 Units, Subcutaneous, Q12H TESS, Jessie Bailey PA-C, 25 Units at 07/25/24 0819    insulin lispro (HumALOG/ADMELOG) 100 units/mL subcutaneous injection 1-6 Units, 1-6 Units, Subcutaneous, TID AC, 1 Units at 07/23/24 1630 **AND** Fingerstick Glucose (POCT), , , TID AC, Jessie Bailey PA-C    levothyroxine tablet 175 mcg, 175 mcg, Oral, Early Morning, Jessie Bailey PA-C, 175 mcg at 07/25/24 0543    ondansetron (ZOFRAN) injection 4 mg, 4 mg, Intravenous, Q6H PRN, Jessie Bailey PA-C    pantoprazole (PROTONIX) EC tablet 40 mg, 40 mg, Oral, Every Other Day, Jessie Bailey PA-C, 40 mg at 07/25/24 0819    senna (SENOKOT) tablet 8.6 mg, 1 tablet, Oral, BID PRN, Meir Prather MD    zolpidem (AMBIEN) tablet 10 mg, 10 mg, Oral, HS PRN, Jessie Bailey PA-C    Past Medical History:   Diagnosis Date    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    Chronic kidney disease     Colon polyp     Constipation 03/09/2023    Diabetes mellitus (HCC)     Disease of thyroid gland     GERD (gastroesophageal reflux disease)     HHS vs DKA 11/16/2018     Hyperlipidemia     Hypertension     Thyroid cancer ([facility])        Patient Active Problem List    Diagnosis Date Noted    Septic arthritis of knee ([facility]) 02/06/2024    Poor dentition 07/22/2024    Pain 07/22/2024    At risk for venous thromboembolism (VTE) 07/22/2024    Diabetic neuropathy ([facility]) 07/22/2024    Anemia 07/22/2024    Leukocytosis 07/22/2024    Chronic pain of right knee 07/10/2024    Type 2 diabetes mellitus without complication, with long-term current use of insulin ([facility]) 07/10/2024    S/P hardware removal 06/20/2023    MANNY (obstructive sleep apnea) 05/01/2023    Constipation 03/09/2023    Depression and anxiety 03/03/2023    Acquired genu varum of right lower extremity 02/21/2023    MARKELL (acute kidney injury) ([facility]) 02/14/2023    Chronic kidney disease-mineral and bone disorder 02/14/2023    Closed fracture of right tibial plateau 02/07/2023    Stage 3b chronic kidney disease ([facility]) 11/15/2022    Hyperkalemia 11/15/2022    Alcoholism ([facility]) 11/15/2022    Dupuytren's contracture of right hand 06/07/2022    Type 2 diabetes mellitus with diabetic peripheral angiopathy without gangrene ([facility]) 02/07/2022    Acquired hypothyroidism 10/18/2021    Acquired absence of right foot ([facility]) 03/29/2021    Persistent proteinuria 01/12/2021    Abnormal EKG 12/03/2020    Insomnia 12/03/2020    Orthostasis 10/08/2020    Urinary retention 09/08/2020    Thyroid cancer ([facility]) 08/20/2020    PAD (peripheral artery disease) ([facility]) 08/05/2020    Elevated liver enzymes 10/24/2019    Colon polyps 09/12/2019    Right-sided tinnitus 11/26/2018    Hypertension, essential 09/24/2018    Gastroesophageal reflux disease without esophagitis 09/24/2018    Hyperlipidemia 12/17/2009    History of nonadherence to medical treatment 02/20/2008          Laxmi Marks MD    I have spent a total time of 40 minutes on 07/25/24 in caring for this patient including Impressions, Counseling / Coordination of care, and Documenting in the medical  record.      ** Please Note:  voice to text software may have been used in the creation of this document. Although proof errors in transcription or interpretation are a potential of such software**

## 2024-07-25 NOTE — PROGRESS NOTES
07/25/24 0907   Pain Assessment   Pain Assessment Tool 0-10   Pain Score 8   Pain Location/Orientation Orientation: Right;Location: Knee   Restrictions/Precautions   Precautions Bed/chair alarms;Fall Risk;Limb alert;Pain;Supervision on toilet/commode   RLE Weight Bearing Per Order WBAT   ROM Restrictions No   Cognition   Overall Cognitive Status WFL   Arousal/Participation Alert;Responsive;Cooperative   Attention Within functional limits   Orientation Level Oriented X4   Memory Within functional limits   Following Commands Follows all commands and directions without difficulty   Roll Left and Right   Type of Assistance Needed Independent   Physical Assistance Level No physical assistance   Roll Left and Right CARE Score 6   Sit to Lying   Type of Assistance Needed Independent   Physical Assistance Level No physical assistance   Sit to Lying CARE Score 6   Lying to Sitting on Side of Bed   Type of Assistance Needed Independent   Physical Assistance Level No physical assistance   Lying to Sitting on Side of Bed CARE Score 6   Sit to Stand   Type of Assistance Needed Independent   Physical Assistance Level No physical assistance   Comment RW, increased time   Sit to Stand CARE Score 6   Bed-Chair Transfer   Type of Assistance Needed Supervision   Physical Assistance Level No physical assistance   Comment with and without RW   Chair/Bed-to-Chair Transfer CARE Score 4   Car Transfer   Reason if not Attempted Environmental limitations   Car Transfer CARE Score 10   Walk 10 Feet   Type of Assistance Needed Incidental touching   Physical Assistance Level No physical assistance   Comment CGA RW   Walk 10 Feet CARE Score 4   Walk 50 Feet with Two Turns   Type of Assistance Needed Incidental touching   Physical Assistance Level No physical assistance   Comment CGA RW   Walk 50 Feet with Two Turns CARE Score 4   Walk 150 Feet   Reason if not Attempted Safety concerns   Walk 150 Feet CARE Score 88   Walking 10 Feet on Uneven  Surfaces   Type of Assistance Needed Physical assistance   Physical Assistance Level 25% or less   Comment CGA/La RW on green foam   Walking 10 Feet on Uneven Surfaces CARE Score 3   Ambulation   Primary Mode of Locomotion Prior to Admission Walk   Distance Walked (feet) 75 ft  (75' x 2, 10')   Assist Device Roller Walker   Findings CGA RW; intermittent step-through reciprocal gait pattern, improving WB tolerance through R LE   Does the patient walk? 2. Yes   Wheel 50 Feet with Two Turns   Type of Assistance Needed Independent   Physical Assistance Level No physical assistance   Wheel 50 Feet with Two Turns CARE Score 6   Wheel 150 Feet   Type of Assistance Needed Independent   Physical Assistance Level No physical assistance   Wheel 150 Feet CARE Score 6   Wheelchair mobility   Does the patient use a wheelchair? 1. Yes   Type of Wheelchair Used 1. Manual   Curb or Single Stair   Reason if not Attempted Safety concerns   1 Step (Curb) CARE Score 88   4 Steps   Reason if not Attempted Safety concerns   4 Steps CARE Score 88   12 Steps   Reason if not Attempted Activity not applicable   12 Steps CARE Score 9   Picking Up Object   Reason if not Attempted Safety concerns   Picking Up Object CARE Score 88   Toilet Transfer   Comment Pt did not require during session   Therapeutic Interventions   Strengthening Supine LE TE   Flexibility Passive gastrocnemius stretch, supine knee flexion stretch with strap; TKE stretch   Balance Static standing balance/tolerance without RW: play close the box   Other bed mobility, transfer training, gait training, w/c mobility   Equipment Use   NuStep 1 min, L1, increased pain   Assessment   Treatment Assessment Pt agreeable to PT this AM, received sitting upright in recliner. ACE wrap on R LE incision site removed as per MD at beginning of session. Pt continues to tolerate TE well, with high motivation to continue progress with R LE A/PROM. Pt demonstrated decreased symptoms of  orthostasis t/o session, tolerated two longer ambulatory trials this session. Pt able to stand for >3 min with one UE supported on table while playing game. Assisted with R LE dressing change with MD at end of session. Pt continues to participate well with PT, requires frequent and prolonged rest breaks 2/2 pain/fatigue. Will attempt stair negotiation next session. Will continue with current PT POC to improve deficits and promote return to PLOF.   Problem List Decreased strength;Decreased range of motion;Decreased endurance;Impaired balance;Decreased mobility;Pain;Decreased skin integrity   PT Barriers   Physical Impairment Decreased strength;Decreased range of motion;Decreased endurance;Impaired balance;Decreased safety awareness;Impaired sensation;Orthopedic restrictions;Pain   Functional Limitation Walking;Transfers;Standing;Stair negotiation;Ramp negotiation;Car transfers   Plan   Treatment/Interventions Functional transfer training;LE strengthening/ROM;Therapeutic exercise;Endurance training;Patient/family training;Equipment eval/education;Bed mobility;Gait training   Progress Progressing toward goals   PT Therapy Minutes   PT Time In 0907   PT Time Out 1100   PT Total Time (minutes) 113   PT Mode of treatment - Individual (minutes) 113   PT Mode of treatment - Concurrent (minutes) 0   PT Mode of treatment - Group (minutes) 0   PT Mode of treatment - Co-treat (minutes) 0   PT Mode of Treatment - Total time(minutes) 113 minutes   PT Cumulative Minutes 417     Supine LE TE as performed previously, 2.5# L LE.    Patient remains supine in bed, all needs in reach.  Alarm in place and activated.  Encouraged use of call bell, patient verbalizes understanding.

## 2024-07-25 NOTE — PROGRESS NOTES
07/25/24 0655   Pain Assessment   Pain Assessment Tool 0-10   Pain Score 7   Pain Location/Orientation Orientation: Right;Location: Knee   Pain Onset/Description Onset: Ongoing   Patient's Stated Pain Goal No pain   Restrictions/Precautions   Precautions Bed/chair alarms;Fall Risk;Limb alert;Pain   RLE Weight Bearing Per Order WBAT   ROM Restrictions No   Oral Hygiene   Type of Assistance Needed Independent   Physical Assistance Level No physical assistance   Comment w/c level   Oral Hygiene CARE Score 6   Grooming   Able To Initiate Tasks;Comb/Brush Hair;Wash/Dry Face;Brush/Clean Teeth;Wash/Dry Hands   Findings w/c level   Shower/Bathe Self   Type of Assistance Needed Set-up / clean-up   Physical Assistance Level No physical assistance   Comment CHG wipes   Shower/Bathe Self CARE Score 5   Bathing   Assessed Bath Style Sponge Bath   Limitations Noted in Balance;Strength;Safety   Positioning Seated;Standing   Upper Body Dressing   Type of Assistance Needed Independent   Physical Assistance Level No physical assistance   Comment w/c level   Upper Body Dressing CARE Score 6   Lower Body Dressing   Comment kept same shorts on, pulled down to bathe   Putting On/Taking Off Footwear   Type of Assistance Needed Set-up / clean-up   Physical Assistance Level No physical assistance   Comment don sneakers   Putting On/Taking Off Footwear CARE Score 5   Sit to Lying   Type of Assistance Needed Independent   Physical Assistance Level No physical assistance   Sit to Lying CARE Score 6   Lying to Sitting on Side of Bed   Type of Assistance Needed Independent   Physical Assistance Level No physical assistance   Comment bed flat   Lying to Sitting on Side of Bed CARE Score 6   Sit to Stand   Type of Assistance Needed Independent   Physical Assistance Level No physical assistance   Sit to Stand CARE Score 6   Bed-Chair Transfer   Type of Assistance Needed Set-up / clean-up   Physical Assistance Level No physical assistance    Chair/Bed-to-Chair Transfer CARE Score 5   Toileting Hygiene   Type of Assistance Needed Supervision   Physical Assistance Level No physical assistance   Toileting Hygiene CARE Score 4   Toileting   Able to Pull Clothing down yes, up yes.   Limitations Noted In Balance;LE Strength   Adaptive Equipment Grab Bar   Toilet Transfer   Type of Assistance Needed Supervision;Adaptive equipment   Physical Assistance Level No physical assistance   Toilet Transfer CARE Score 4   Toilet Transfer   Surface Assessed Raised Toilet   Transfer Technique Standard   Limitations Noted In Balance;ROM;LE Strength   Adaptive Equipment Walker   Light Housekeeping   Light Housekeeping Level Wheelchair   Light Housekeeping Level of Assistance Modified independent   Light Housekeeping pt s/u items for ADL w/c level   Cognition   Overall Cognitive Status WFL   Arousal/Participation Alert;Cooperative   Attention Within functional limits   Orientation Level Oriented X4   Memory Within functional limits   Following Commands Follows all commands and directions without difficulty   Additional Activities   Additional Activities Comments fxnl mobility CS in room to/from BR to bed followed by 4 seperate sets in hallway with seated rest periods between due to fatigue   Activity Tolerance   Activity Tolerance Patient tolerated treatment well   Assessment   Treatment Assessment OT session addressed ADL/iADLs with focus on  compensatory strategies and safe techniuqes. ALso educ pt in positional changes due to edema in R LE. Declines abdominal binder despite education. Pt does not feel like he notices any differences.  Increased R LE edema noted, pitting observed. Nurse made aware and assessed.  Pt not symptomatic during session with STS or txfs and mobility. Vitals assessed and documented by nurse during session. OT educ pt in fxnl mobility with use of RW vs previous mode with w/c. Pt tolerance to session was overall good with pain pre session 7/10 and  post 8/10. Pt is consistently making posiitve gains toward OT goals and d/c planning is ongoing to prepare pt for safe d/c to e with supportive wife.   Prognosis Good   Problem List Decreased strength;Decreased range of motion;Decreased endurance;Impaired balance;Decreased mobility;Pain;Decreased skin integrity   Plan   Treatment/Interventions ADL retraining;Functional transfer training;Therapeutic exercise;Endurance training;Patient/family training;Equipment eval/education;Bed mobility;Gait training;Compensatory technique education;Spoke to nursing;OT   Progress Progressing toward goals   OT Therapy Minutes   OT Time In 0655   OT Time Out 0825   OT Total Time (minutes) 90   OT Mode of treatment - Individual (minutes) 90   OT Mode of treatment - Concurrent (minutes) 0   OT Mode of treatment - Group (minutes) 0   OT Mode of treatment - Co-treat (minutes) 0   OT Mode of Treatment - Total time(minutes) 90 minutes   OT Cumulative Minutes 365   Therapy Time missed   Time missed? No

## 2024-07-26 LAB
GLUCOSE SERPL-MCNC: 154 MG/DL (ref 65–140)
GLUCOSE SERPL-MCNC: 154 MG/DL (ref 65–140)
GLUCOSE SERPL-MCNC: 161 MG/DL (ref 65–140)
GLUCOSE SERPL-MCNC: 195 MG/DL (ref 65–140)

## 2024-07-26 PROCEDURE — 97542 WHEELCHAIR MNGMENT TRAINING: CPT

## 2024-07-26 PROCEDURE — 97530 THERAPEUTIC ACTIVITIES: CPT

## 2024-07-26 PROCEDURE — 97116 GAIT TRAINING THERAPY: CPT

## 2024-07-26 PROCEDURE — 97110 THERAPEUTIC EXERCISES: CPT

## 2024-07-26 PROCEDURE — 97112 NEUROMUSCULAR REEDUCATION: CPT

## 2024-07-26 PROCEDURE — 82948 REAGENT STRIP/BLOOD GLUCOSE: CPT

## 2024-07-26 PROCEDURE — 99232 SBSQ HOSP IP/OBS MODERATE 35: CPT | Performed by: PHYSICAL MEDICINE & REHABILITATION

## 2024-07-26 PROCEDURE — 97535 SELF CARE MNGMENT TRAINING: CPT

## 2024-07-26 RX ADMIN — DAPTOMYCIN 575 MG: 500 INJECTION, POWDER, LYOPHILIZED, FOR SOLUTION INTRAVENOUS at 16:53

## 2024-07-26 RX ADMIN — INSULIN GLARGINE 25 UNITS: 100 INJECTION, SOLUTION SUBCUTANEOUS at 08:06

## 2024-07-26 RX ADMIN — ASPIRIN 81 MG: 81 TABLET, COATED ORAL at 08:06

## 2024-07-26 RX ADMIN — INSULIN LISPRO 1 UNITS: 100 INJECTION, SOLUTION INTRAVENOUS; SUBCUTANEOUS at 08:07

## 2024-07-26 RX ADMIN — INSULIN LISPRO 2 UNITS: 100 INJECTION, SOLUTION INTRAVENOUS; SUBCUTANEOUS at 11:57

## 2024-07-26 RX ADMIN — INSULIN LISPRO 1 UNITS: 100 INJECTION, SOLUTION INTRAVENOUS; SUBCUTANEOUS at 16:52

## 2024-07-26 RX ADMIN — ESCITALOPRAM OXALATE 5 MG: 5 TABLET, FILM COATED ORAL at 08:06

## 2024-07-26 RX ADMIN — ASPIRIN 81 MG: 81 TABLET, COATED ORAL at 17:27

## 2024-07-26 RX ADMIN — OXYCODONE HYDROCHLORIDE AND ACETAMINOPHEN 500 MG: 500 TABLET ORAL at 17:27

## 2024-07-26 RX ADMIN — INSULIN GLARGINE 25 UNITS: 100 INJECTION, SOLUTION SUBCUTANEOUS at 20:27

## 2024-07-26 RX ADMIN — ATORVASTATIN CALCIUM 40 MG: 40 TABLET, FILM COATED ORAL at 16:53

## 2024-07-26 RX ADMIN — LEVOTHYROXINE SODIUM 175 MCG: 100 TABLET ORAL at 05:01

## 2024-07-26 RX ADMIN — OXYCODONE HYDROCHLORIDE AND ACETAMINOPHEN 500 MG: 500 TABLET ORAL at 08:06

## 2024-07-26 NOTE — PROGRESS NOTES
07/26/24 1101   Pain Assessment   Pain Assessment Tool 0-10   Pain Score 7   Pain Location/Orientation Orientation: Right;Location: Knee   Restrictions/Precautions   Precautions Bed/chair alarms;Fall Risk;Limb alert;Pain;Supervision on toilet/commode  (orthostasis)   RLE Weight Bearing Per Order WBAT   ROM Restrictions No   Braces or Orthoses   (ankle brace R LE)   Cognition   Overall Cognitive Status WFL   Arousal/Participation Alert;Responsive;Cooperative   Attention Within functional limits   Orientation Level Oriented X4   Memory Within functional limits   Following Commands Follows all commands and directions without difficulty   Subjective   Subjective Pt reports he had increased symptoms this morning with OT, hopes they subside this AM/PM   Roll Left and Right   Type of Assistance Needed Independent   Physical Assistance Level No physical assistance   Roll Left and Right CARE Score 6   Sit to Lying   Type of Assistance Needed Independent   Physical Assistance Level No physical assistance   Sit to Lying CARE Score 6   Lying to Sitting on Side of Bed   Type of Assistance Needed Independent   Physical Assistance Level No physical assistance   Lying to Sitting on Side of Bed CARE Score 6   Sit to Stand   Type of Assistance Needed Independent   Physical Assistance Level No physical assistance   Sit to Stand CARE Score 6   Bed-Chair Transfer   Type of Assistance Needed Supervision   Physical Assistance Level No physical assistance   Comment with and without RW   Chair/Bed-to-Chair Transfer CARE Score 4   Car Transfer   Reason if not Attempted Environmental limitations   Car Transfer CARE Score 10   Walk 10 Feet   Type of Assistance Needed Incidental touching   Physical Assistance Level No physical assistance   Comment CGA RW   Walk 10 Feet CARE Score 4   Walk 50 Feet with Two Turns   Type of Assistance Needed Physical assistance   Physical Assistance Level 51%-75%   Comment RW; 2/2 LOB from unilateral UE hand  gesture during ambulation, otherwise CGA / Cl S   Walk 50 Feet with Two Turns CARE Score 2   Walk 150 Feet   Reason if not Attempted Safety concerns   Walk 150 Feet CARE Score 88   Ambulation   Primary Mode of Locomotion Prior to Admission Walk   Distance Walked (feet) 130 ft   Assist Device Roller Walker   Findings Cl S except for LOB when pt used single UE to make hand gesture, causing LOB requiring ModA to correct; no symptoms of orthostasis during ambulation; limited by R knee pain/fatigue   Does the patient walk? 2. Yes   Wheel 50 Feet with Two Turns   Type of Assistance Needed Independent   Physical Assistance Level No physical assistance   Wheel 50 Feet with Two Turns CARE Score 6   Wheel 150 Feet   Type of Assistance Needed Independent   Physical Assistance Level No physical assistance   Wheel 150 Feet CARE Score 6   Wheelchair mobility   Does the patient use a wheelchair? 1. Yes   Type of Wheelchair Used 1. Manual   Curb or Single Stair   Style negotiated Single stair   Type of Assistance Needed Physical assistance;Verbal cues   Physical Assistance Level 26%-50%   Comment up/down 3 steps with R HR; VC for sequencing; increased symptoms of orthostasis by end of stair negotiation; see BP details below   1 Step (Curb) CARE Score 3   4 Steps   Reason if not Attempted Safety concerns   4 Steps CARE Score 88   12 Steps   Reason if not Attempted Activity not applicable   12 Steps CARE Score 9   Picking Up Object   Reason if not Attempted Safety concerns   Picking Up Object CARE Score 88   Toilet Transfer   Comment Pt did not require during session   Therapeutic Interventions   Strengthening Supine LE TE   Flexibility Passive B/L gastrocnemius stretch, passive knee extension stretch, supine knee flexion with strap stretch   Balance Static standing balance with unilateral UE support while organizing popsicle sticks: 4 bouts of ~3 min each   Other bed mobility, transfer training, gait training, stair training, w/c  mobility   Assessment   Treatment Assessment Pt agreeable to PT this AM/PM, received supine in bed. Pt treatment session occurred from 11:00-13:30, with 30 min from 12:00-12:30 for pt lunch. Pt continues to display orthostasis and symptoms of lightheadedness/dizziness during positional changes and functional mobility. Pt only symptomatic following stair negotiation this session, which subsided with seated rest break. Pt is demonstrating imroved LE strength/endurance and WB tolerance with increased ambulatory distance and stair negotiation this session. Pt continues to require UE support for maintaining standing balance, with LOB and unsteadiness when only utilizing unilateral UE support. He remains limited by pain, fatigue, and decreased upright tolerance, but tolerating all TE well. Will continue with current PT POC to improve deficits and promote return to PLOF.   Problem List Decreased strength;Decreased range of motion;Decreased endurance;Impaired balance;Decreased mobility;Decreased safety awareness;Decreased skin integrity;Pain   PT Barriers   Physical Impairment Decreased strength;Decreased range of motion;Decreased endurance;Impaired balance;Decreased safety awareness;Impaired sensation;Orthopedic restrictions;Pain   Functional Limitation Walking;Transfers;Standing;Stair negotiation;Ramp negotiation;Car transfers   Plan   Treatment/Interventions Functional transfer training;LE strengthening/ROM;Therapeutic exercise;Endurance training;Patient/family training;Equipment eval/education;Bed mobility;Gait training   Progress Progressing toward goals   PT Therapy Minutes   PT Time In 1100   PT Time Out 1330   PT Total Time (minutes) 150   PT Mode of treatment - Individual (minutes) 120   PT Mode of treatment - Concurrent (minutes) 0   PT Mode of treatment - Group (minutes) 0   PT Mode of treatment - Co-treat (minutes) 0   PT Mode of Treatment - Total time(minutes) 120 minutes   PT Cumulative Minutes 537     Pt  treatment session occurred 11:00 - 13:30, with 30 min break for patient lunch from 12:00 - 12:30, documented in 11:01 column.    BP readings during session:  144/76 sitting at EOB following supine LE TE  125/62 standing at EOB, no reported symptoms  133/64 following ambulation sitting at edge of PT mat, no reported symptoms  103/59 following stair negotiation with reported symptoms, resolved with ~2 min seated rest break  140/72 pt returned supine    Supine LE TE as performed previously.    Patient remains supine in bed, all needs in reach.  Alarm in place and activated.  Encouraged use of call bell, patient verbalizes understanding.

## 2024-07-26 NOTE — PROGRESS NOTES
"Progress Note - Aaron Richards 59 y.o. male MRN: [id number]    Unit/Bed#: -01 Encounter: [id number]        Subjective:   Patient seen and examined at bedside after reviewing the chart and discussing the case with the caring staff.      Patient examined at bedside.  Patient continues to be orthostatic.  Patient reports he is usually asymptomatic but today felt very lightheaded after shower.      Physical Exam   Vitals: Blood pressure (!) 80/50, pulse 97, temperature 98.4 °F (36.9 °C), temperature source Temporal, resp. rate 20, height 6' 4\" (1.93 m), weight 111 kg (245 lb 6 oz), SpO2 97%.,Body mass index is 29.87 kg/m².  Constitutional: Patient in no acute distress.  HEENT: PERR, EOMI, MMM.  Cardiovascular: Normal rate and regular rhythm.    Pulmonary/Chest: Effort normal and breath sounds normal.   Abdomen: Soft, + BS, NT.    Assessment/Plan:  Aaron Richards is a(n) 59 y.o. 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  Continue ASA 81 mg twice daily (for 4 weeks for ortho DVT ppx, then back to daily) and atorvastatin 40 mg daily.  Home: irbesartan/hydrochlorothiazide 150-12.5 mg daily.  Discontinued all BP medication due to orthostasis.  Abdominal binder.  Trial saltines prior to shower.   DM2.  Hgb A1c 6.2%.  Continue Lantus 25 units twice daily.  SSI w/ POCT AS HS.  Home: Lantus 30 units BID and Apidra SSI.   Acquired hypothyroidism. Hx thyroid cancer s/p thyroidectomy.   Continue levothyroxine 175 mcg daily.   GERD.  Continue Protonix 40 mg daily.   CKD3b.  Stable.  Cr 0.65 on 7/22.  Avoid nephrotoxins.   MDD/anxiety.  Continue Lexapro 20 mg daily.   Hx of alcohol use.  Stable.  Sober since 2023.   Insomnia.  Continue Ambien 10 mg HS as needed.   Pain and bowel regimen.  As per physiatry.   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  Continue daptomycin 6 mg/kg IV every 24 hours through 8/26.  Check CPK weekly.  Patient receiving " intensive PT OT as per physiatry.     Anticipated date of discharge.  Thursday 8/1/24.    The patient was discussed with Dr. Zepeda and he is in agreement with the above note.

## 2024-07-26 NOTE — CASE MANAGEMENT
CM received request for Savita in OT to call patient's wife, as she has questions. CM called yolande Najera [phone], left message on voice mail with contact information,with request for return phone call.

## 2024-07-26 NOTE — PROGRESS NOTES
PM&R PROGRESS NOTE:  Aaron Richards 59 y.o. male MRN: [id number]  Unit/Bed#: -01 Encounter: [id number]        Rehab Diagnosis: Orthopedic Disorders:  08.61  Unilateral Knee Replacement  Etiologic Diagnosis:   s/p R antibiotic cement TKA with Dr. 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 Prather: 59 year old M with PMH of HTN, PAD, R TMA, HLD, DM2, CKD, GERD, diabetic neuropathy who had history of traumatic HW failure requiring removal of HW and later developed sinus track extending into jt space with op finding of septic arthritis who underwent complicated R hand crafted abx cement TKA with augmentation by Dr Hendrix on 7/15. ID consulted and recommended IV Dapto for 6 week course with closely monitoring labs. Post-op course notable for pain, leukocytosis, anemia, and significant decline in ADLs and mobility.     SUBJECTIVE: Patient seen face-to-face this morning in OT.  Had a good night.  Denies fevers or chills.  No increased knee or leg pain.  Happy with his progress.  Improving orthostasis.        ASSESSMENT: Stable, progressing      PLAN:    Rehabilitation  Continue current rehabilitation plan of care to maximize function.    Estimated Discharge: Thursday, 8/1/2024 with home health care RN, PT, OT      Current Function:  Physical Therapy Occupational Therapy Speech Therapy   Weight Bearing Status: Weight Bearing as Tolerated  Transfers: Incidental Touching, Minimal Assistance (cg STS with RW; CG/min A SPT with walker)  Bed Mobility: Independent  Amulation Distance (ft): 50 feet  Ambulation: Minimal Assistance  Assistive Device for Ambulation: Roller Walker  Wheelchair Mobility Distance: 150 ft  Wheelchair Mobility: Independent  Number of Stairs:  (TBA)  Assistive Device for Stairs:  (TBA)  Stair Assistance:  (TBA)  Ramp:  (TBA)  Assistive Device for Ramp:  (TBA)  Discharge Recommendations: Home with:  DC Home with:: Family Support, First Floor  Setup, Home Physical Therapy, Outpatient Physical Therapy   Eating: Independent  Grooming: Supervision  Bathing: Supervision  Bathing: Supervision  Upper Body Dressing: Supervision  Lower Body Dressing: Supervision  Toileting: Supervision  Tub/Shower Transfer:  (TBA)  Toilet Transfer: Supervision, Incidental Touching  Cognition: Within Defined Limits  Orientation: Person, Place, Time, Situation                 DVT prophylaxis  Continue aspirin 81 mg twice daily-per orthopedics    Bladder plan  Continent and voiding    Bowel plan  Continent  Last BM 7/25/2024      * Septic arthritis of knee (HCC)  Assessment & Plan  Prior traumatic HW failure requiring HW removal and then developed sinus tract extending into jt space with op finding c/w septic arthritis per last ID note  - S/p complicated right hand crafted antibiotic cement total knee arthroplasty with augmentation by ortho, Dr Hendrix on 7/15  Weight-bearing as tolerated on affected limb; ROM as tolerated, pillow/blankets under achilles not behind knee while in bed  Comgmt by IM during ARC course   Daptomycin IV through 8/26 for 6 week course - Monitor labs (CBC/BMP and CK weekly) while on Abx; remove PICC after IV abx   7/22 - Stable mild leukocytosis - 12K, BMP unremarkable, CK wnl  Monitor incision/area for infection or dehiscence/impaired healing - decreased serosang drainage, no clear signs of infection again  generalized knee edema stable - pt feels it is improving   Recommend wound care consult - pending   In interim, Change dressing with DSD BID and PRN if soiled to avoid maceration - hopefully can decrease when improves.  Notify MD of increased drainage, pus, redness, or breakdown of incision   POD#14 is Mon 7/29  Monitor for signs/symptoms of VTE, atelectasis, acute blood loss anemia, or other infection   Function improving   Continue acute comprehensive interdisciplinary inpatient rehabilitation to include intensive skilled therapies (PT, OT) as outlined  with oversight and management by rehabilitation physician as well as inpatient rehab level nursing, case management and weekly interdisciplinary team meetings.   Follow-up with Ortho Sx after d/c and ortho if needed during ARC course     7/24/24      7/15/24        Leukocytosis  Assessment & Plan  Stable at 12K on 7/22   Comgmt with IM team  - Monitor closely  - On IV abx per ortho  - Monitor exam, labs    Anemia  Assessment & Plan  Hb 10.1 on 7/22  Consult(ed) IM and comanagement with their service during ARC course   Monitor H/H, vitals, signs/symptoms of acute bleeding      Diabetic neuropathy (HCC)  Assessment & Plan  Optimal DM mgmt  Monitor skin for increased infection/breakdown/wounds which patient is at higher risk for  Skilled PT/OT   Fall precautions      At risk for venous thromboembolism (VTE)  Assessment & Plan  SCDs, ambulation, and aspirin 81mg BID per ortho       Pain  Assessment & Plan  Controlled   Ice PRN   APAP 975mg TID   Hydromorphone 2mg Q4H PRN (pt felt 4mg was too strong)   Monitor for oversedation, AMS/delirium, and respiratory depression   If being administered - hold opiates, muscle relaxants, benzodiazepines, and gabapentin for oversedation, AMS, or RR<12.  Counseled on and continue to encourage deep breathing/relaxation/behavioral pain management techniques      Poor dentition  Assessment & Plan  ID recommends OMFS eval for possible extractions - this was not obtained on acute PTA per prior providers - will recommend OP follow-up unless concerns in interim - discussed with pt   - Mgmt per IM here     Depression and anxiety  Assessment & Plan  Lexapro   Supportive counseling  Counseled on and continue to encourage deep breathing/relaxation/behavioral management techniques      Type 2 diabetes mellitus with diabetic peripheral angiopathy without gangrene (HCC)  Assessment & Plan  Lab Results   Component Value Date    HGBA1C 6.2 (H) 07/01/2024     - Current management by internal  "medicine  - Monitor for signs and symptoms of hypoglycemia   - Current meds: Lantus, Lispro per IM   - Consistent carb diabetic diet  - Recommend close monitoring and optimal management during recovery/rehab phase as well       Orthostasis  Assessment & Plan  Acute orthostasis upon arrival to [facility]  Abdominal binder and compression stockings as needed during therapies  Adequate hydration  Internal medicine did discontinue Losartan 7/25/24  Following response    PAD (peripheral artery disease) ([facility])  Assessment & Plan  Hx of peripheral arterial disease, with previous right popliteal artery stent, and subsequent TMA   - Overall management per hospitalist during acute course  - Antithrombotic: currently on aspiring 81mg BID (also for VTE proph) per prior providers   - Statin  - Optimal blood pressure and blood sugar control  (Was recommended asp and plavix when last seen by [facility] 4/2023-unclear why not on this)  - OP [facility] sx follow-up         Hyperlipidemia  Assessment & Plan  Statin     Gastroesophageal reflux disease without esophagitis  Assessment & Plan  Protonix 40 mg every other day    Hypertension, essential  Assessment & Plan  At home: Losartan 25 mg daily  Here: None - due to orthostasis discontinued on 7/25/24 by internal medicine.  Will need to watch carefully.  Orthostasis has improved  Internal medicine consulted and co-management with their service  Monitor vitals with and without activity; monitor for orthostasis  Monitor hemoglobin, electrolytes, kidney function, hydration status       Labs, medications, and imaging personally reviewed 07/26/24.      ROS:  A ten point review of systems was completed on 07/26/24  and pertinent positives are listed in subjective section. All other systems reviewed were negative.     OBJECTIVE:   /79 (BP Location: Left arm)   Pulse 82   Temp 98.4 °F (36.9 °C) (Temporal)   Resp 20   Ht 6' 4\" (1.93 m)   Wt 111 kg (245 lb 6 oz)   SpO2 97%   BMI 29.87 kg/m² "     Physical Exam  Vitals and nursing note reviewed.   Constitutional:       General: He is not in acute distress.     Appearance: He is well-developed.   HENT:      Head: Normocephalic.      Nose: Nose normal.   Eyes:      Conjunctiva/sclera: Conjunctivae normal.   Cardiovascular:      Rate and Rhythm: Normal rate and regular rhythm.      Pulses: Normal pulses.   Pulmonary:      Effort: Pulmonary effort is normal.      Breath sounds: Normal breath sounds. No wheezing.   Abdominal:      General: Bowel sounds are normal. There is no distension.      Palpations: Abdomen is soft.      Tenderness: There is no abdominal tenderness.   Musculoskeletal:         General: Swelling and tenderness present.      Cervical back: Neck supple.      Right lower leg: Edema present.      Comments: Right dorsiflexion weakness  Right previous TMA   Skin:     General: Skin is warm.      Comments: Incision medially and laterally with sutures   Scant drainage from lateral large incision  +superficial edema near incision   Neurological:      Mental Status: He is alert and oriented to person, place, and time.      Sensory: No sensory deficit.      Motor: Weakness present.      Gait: Gait abnormal.   Psychiatric:         Mood and Affect: Mood normal.        7/25/24:            Lab Results   Component Value Date    WBC 12.98 (H) 07/22/2024    HGB 10.1 (L) 07/22/2024    HCT 31.3 (L) 07/22/2024    MCV 93 07/22/2024     07/22/2024     Lab Results   Component Value Date    SODIUM 140 07/22/2024    K 4.0 07/22/2024     07/22/2024    CO2 27 07/22/2024    BUN 17 07/22/2024    CREATININE 0.65 07/22/2024    GLUC 104 07/22/2024    CALCIUM 8.4 07/22/2024     Lab Results   Component Value Date    INR 1.08 07/01/2024    INR 1.09 08/15/2023    INR 1.04 08/04/2020    PROTIME 13.9 07/01/2024    PROTIME 13.9 08/15/2023    PROTIME 13.8 08/04/2020           Current Facility-Administered Medications:     acetaminophen (TYLENOL) tablet 975 mg, 975 mg,  Oral, Q8H PRN, Meir Prather MD, 975 mg at 07/25/24 1630    ascorbic acid (VITAMIN C) tablet 500 mg, 500 mg, Oral, BID, Jessie Bailey PA-C, 500 mg at 07/26/24 0806    aspirin (ECOTRIN LOW STRENGTH) EC tablet 81 mg, 81 mg, Oral, BID, Jessie Bailey PA-C, 81 mg at 07/26/24 0806    atorvastatin (LIPITOR) tablet 40 mg, 40 mg, Oral, Daily With Dinner, Jessie Bailey PA-C, 40 mg at 07/25/24 1718    DAPTOmycin (CUBICIN) 575 mg in sodium chloride 0.9 % 50 mL IVPB, 6 mg/kg (Adjusted), Intravenous, Q24H, Laxmi Marks MD, Stopped at 07/25/24 1659    docusate sodium (COLACE) capsule 100 mg, 100 mg, Oral, TID PRN, Laxmi Marks MD    escitalopram (LEXAPRO) tablet 5 mg, 5 mg, Oral, Daily, Jessie Bailey PA-C, 5 mg at 07/26/24 0806    HYDROmorphone (DILAUDID) tablet 2 mg, 2 mg, Oral, Q4H PRN, Meir Prather MD, 2 mg at 07/23/24 2205    insulin glargine (LANTUS) subcutaneous injection 25 Units 0.25 mL, 25 Units, Subcutaneous, Q12H TESS, Jessie Bailey PA-C, 25 Units at 07/26/24 0806    insulin lispro (HumALOG/ADMELOG) 100 units/mL subcutaneous injection 1-6 Units, 1-6 Units, Subcutaneous, TID AC, 1 Units at 07/26/24 0807 **AND** Fingerstick Glucose (POCT), , , TID AC, Jsesie Bailey PA-C    levothyroxine tablet 175 mcg, 175 mcg, Oral, Early Morning, Jessie Bailey PA-C, 175 mcg at 07/26/24 0501    ondansetron (ZOFRAN) injection 4 mg, 4 mg, Intravenous, Q6H PRN, Jessie Xu Bailey, PA-C    pantoprazole (PROTONIX) EC tablet 40 mg, 40 mg, Oral, Every Other Day, Jessie Bailey PA-C, 40 mg at 07/25/24 0819    senna (SENOKOT) tablet 8.6 mg, 1 tablet, Oral, BID PRN, Meir Prather MD    zolpidem (AMBIEN) tablet 10 mg, 10 mg, Oral, HS PRN, Jessie Bailey PA-C    Past Medical History:   Diagnosis Date    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    Chronic kidney disease     Colon polyp     Constipation 03/09/2023     Diabetes mellitus (HCC)     Disease of thyroid gland     GERD (gastroesophageal reflux disease)     HHS vs DKA 11/16/2018    Hyperlipidemia     Hypertension     Thyroid cancer ([facility])        Patient Active Problem List    Diagnosis Date Noted    Septic arthritis of knee ([facility]) 02/06/2024    Poor dentition 07/22/2024    Pain 07/22/2024    At risk for venous thromboembolism (VTE) 07/22/2024    Diabetic neuropathy ([facility]) 07/22/2024    Anemia 07/22/2024    Leukocytosis 07/22/2024    Chronic pain of right knee 07/10/2024    Type 2 diabetes mellitus without complication, with long-term current use of insulin ([facility]) 07/10/2024    S/P hardware removal 06/20/2023    MANNY (obstructive sleep apnea) 05/01/2023    Constipation 03/09/2023    Depression and anxiety 03/03/2023    Acquired genu varum of right lower extremity 02/21/2023    MARKELL (acute kidney injury) ([facility]) 02/14/2023    Chronic kidney disease-mineral and bone disorder 02/14/2023    Closed fracture of right tibial plateau 02/07/2023    Stage 3b chronic kidney disease ([facility]) 11/15/2022    Hyperkalemia 11/15/2022    Alcoholism ([facility]) 11/15/2022    Dupuytren's contracture of right hand 06/07/2022    Type 2 diabetes mellitus with diabetic peripheral angiopathy without gangrene ([facility]) 02/07/2022    Acquired hypothyroidism 10/18/2021    Acquired absence of right foot ([facility]) 03/29/2021    Persistent proteinuria 01/12/2021    Abnormal EKG 12/03/2020    Insomnia 12/03/2020    Orthostasis 10/08/2020    Urinary retention 09/08/2020    Thyroid cancer ([facility]) 08/20/2020    PAD (peripheral artery disease) ([facility]) 08/05/2020    Elevated liver enzymes 10/24/2019    Colon polyps 09/12/2019    Right-sided tinnitus 11/26/2018    Hypertension, essential 09/24/2018    Gastroesophageal reflux disease without esophagitis 09/24/2018    Hyperlipidemia 12/17/2009    History of nonadherence to medical treatment 02/20/2008          Laxmi Marks MD    I have spent a total time of 35 minutes on 07/26/24  in caring for this patient including Impressions and Documenting in the medical record.      ** Please Note:  voice to text software may have been used in the creation of this document. Although proof errors in transcription or interpretation are a potential of such software**

## 2024-07-26 NOTE — NURSING NOTE
Notified by ROSS after shower pt felt lightheaded and BP low.  Rechecked BP, VS.  See documentation.  PM&R notified of same.  Orthostatic BP's done.  Pt assist back to bed with legs elevated and BP monitored.  Feeling better once back to bed.  Dressing change to right knee, DSD applied.  Pt tolerated well.  Pt refused abdominal binder as ordered for orthostasis.  Will continue to monitor.   PICC dressing right arm changed aseptic technique.  Transfers RW/CGA.    Safety maintained.  Callbell within reach at all times.

## 2024-07-26 NOTE — PLAN OF CARE
Problem: PAIN - ADULT  Goal: Verbalizes/displays adequate comfort level or baseline comfort level  Description: Interventions:  - Encourage patient to monitor pain and request assistance  - Assess pain using appropriate pain scale  - Administer analgesics based on type and severity of pain and evaluate response  - Implement non-pharmacological measures as appropriate and evaluate response  - Consider cultural and social influences on pain and pain management  - Notify physician/advanced practitioner if interventions unsuccessful or patient reports new pain  Outcome: Progressing     Problem: INFECTION - ADULT  Goal: Absence or prevention of progression during hospitalization  Description: INTERVENTIONS:  - Assess and monitor for signs and symptoms of infection  - Monitor lab/diagnostic results  - Monitor all insertion sites, i.e. indwelling lines, tubes, and drains  - Monitor endotracheal if appropriate and nasal secretions for changes in amount and color  - [place] appropriate cooling/warming therapies per order  - Administer medications as ordered  - Instruct and encourage patient and family to use good hand hygiene technique  - Identify and instruct in appropriate isolation precautions for identified infection/condition  Outcome: Progressing

## 2024-07-26 NOTE — PROGRESS NOTES
07/26/24 0900   Pain Assessment   Pain Assessment Tool 0-10   Pain Score 7   Caputo-Baker FACES Pain Rating 0   Pain Location/Orientation Orientation: Right   Pain Onset/Description Onset: Ongoing   Hospital Pain Intervention(s)   (nurse made aware)   Restrictions/Precautions   Precautions Bed/chair alarms;Fall Risk;Limb alert;Pain   RLE Weight Bearing Per Order WBAT   Oral Hygiene   Type of Assistance Needed Independent   Physical Assistance Level No physical assistance   Comment w/c level   Oral Hygiene CARE Score 6   Grooming   Able To Initiate Tasks;Shave;Comb/Brush Hair;Wash/Dry Face;Brush/Clean Teeth;Wash/Dry Hands   Shower/Bathe Self   Type of Assistance Needed Set-up / clean-up;Adaptive equipment   Physical Assistance Level No physical assistance   Shower/Bathe Self CARE Score 5   Bathing   Assessed Bath Style Shower   Anticipated D/C Bath Style Shower   Able to Adjust Water Temperature Yes   Able to Wash/Rinse/Dry (body part) Left Arm;Right Arm;L Upper Leg;R Upper Leg;L Lower Leg/Foot;Chest;Abdomen;Buttocks;Perineal Area   Limitations Noted in Balance;Endurance;Safety;Strength   Positioning Seated;Standing   Adaptive Equipment Shower Bars;Shower Seat;Hand Held Shower   Tub/Shower Transfer   Limitations Noted In Balance;ROM;UE Strength;LE Strength   Adaptive Equipment Grab Bars;Seat with out Back   Assessed Shower   Findings CS   Upper Body Dressing   Type of Assistance Needed Independent   Physical Assistance Level No physical assistance   Upper Body Dressing CARE Score 6   Lower Body Dressing   Type of Assistance Needed Set-up / clean-up   Physical Assistance Level No physical assistance   Lower Body Dressing CARE Score 5   Putting On/Taking Off Footwear   Type of Assistance Needed Set-up / clean-up   Physical Assistance Level No physical assistance   Putting On/Taking Off Footwear CARE Score 5   Dressing/Undressing Clothing   Remove UB Clothes Pullover Shirt   Don UB Clothes Pullover Shirt   Remove LB  Clothes Socks;Shorts;Shoes   Don LB Clothes Shoes;Socks;Shorts   Limitations Noted In Balance;Endurance;Safety;Strength;ROM   Sit to Lying   Type of Assistance Needed Independent   Physical Assistance Level No physical assistance   Sit to Lying CARE Score 6   Lying to Sitting on Side of Bed   Type of Assistance Needed Independent   Physical Assistance Level No physical assistance   Lying to Sitting on Side of Bed CARE Score 6   Sit to Stand   Type of Assistance Needed Independent   Physical Assistance Level No physical assistance   Sit to Stand CARE Score 6   Toileting Hygiene   Type of Assistance Needed Supervision   Physical Assistance Level No physical assistance   Toileting Hygiene CARE Score 4   Toileting   Able to Pull Clothing down yes, up yes.   Manage Hygiene Bladder;Bowel   Limitations Noted In Balance;LE Strength;ROM   Adaptive Equipment Grab Bar   Toilet Transfer   Type of Assistance Needed Supervision   Physical Assistance Level No physical assistance   Toilet Transfer CARE Score 4   Toilet Transfer   Surface Assessed Raised Toilet   Transfer Technique Standard   Limitations Noted In Balance;ROM;Safety;LE Strength;UE Strength   Adaptive Equipment Walker   Cognition   Overall Cognitive Status WFL   Arousal/Participation Alert;Cooperative   Attention Within functional limits   Orientation Level Oriented X4   Memory Within functional limits   Following Commands Follows all commands and directions without difficulty   Additional Activities   Additional Activities Comments fxnl mobility to/from BR with RW which is how he will complete task at home   Activity Tolerance   Activity Tolerance Patient tolerated treatment well   Assessment   Treatment Assessment OT session addressed ADL via shower level with focus on safety and compenstaory strategies as noted in respective sections of note. Stressed importance of ensuring pt has R LE positioned approp in bed to aide in healing. Continued edema noted in R LE  despite elevating on bed during pm hour. Once shower complete and pt preparing to exit shower to txf to w/c he experienced significant dizziness, BP assessed with dinamap at 93/50 R UE. Nurse notifed. Reassessed EOB after 15 minutes with continued symptoms 99/58 R UE. Pt then positioned in supine with B LEs elevated. After 15 minutes reassessed - orthostatics supine 146/76, seated 135/70, stance 80/50 and symptoms progressed. Nurse also aware and present for assessments. Pt contineus to make positive gains despite issues with BP. Plan is to contiue with skilled OT to further progress pt toward OT goals as per POC.   Prognosis Good   Problem List Decreased strength;Decreased range of motion;Decreased endurance;Impaired balance;Decreased mobility;Decreased safety awareness;Decreased skin integrity;Pain   Plan   Treatment/Interventions ADL retraining;Functional transfer training;Therapeutic exercise;Endurance training;Patient/family training;Equipment eval/education;Bed mobility;Gait training;Compensatory technique education;Spoke to nursing;OT   Progress Progressing toward goals   OT Therapy Minutes   OT Time In 0900   OT Time Out 1030   OT Total Time (minutes) 90   OT Mode of treatment - Individual (minutes) 90   OT Mode of treatment - Concurrent (minutes) 0   OT Mode of treatment - Group (minutes) 0   OT Mode of treatment - Co-treat (minutes) 0   OT Mode of Treatment - Total time(minutes) 90 minutes   OT Cumulative Minutes 455   Therapy Time missed   Time missed? No

## 2024-07-26 NOTE — PLAN OF CARE
Problem: PAIN - ADULT  Goal: Verbalizes/displays adequate comfort level or baseline comfort level  Description: Interventions:  - Encourage patient to monitor pain and request assistance  - Assess pain using appropriate pain scale  - Administer analgesics based on type and severity of pain and evaluate response  - Implement non-pharmacological measures as appropriate and evaluate response  - Consider cultural and social influences on pain and pain management  - Notify physician/advanced practitioner if interventions unsuccessful or patient reports new pain  Outcome: Progressing     Problem: INFECTION - ADULT  Goal: Absence or prevention of progression during hospitalization  Description: INTERVENTIONS:  - Assess and monitor for signs and symptoms of infection  - Monitor lab/diagnostic results  - Monitor all insertion sites, i.e. indwelling lines, tubes, and drains  - Monitor endotracheal if appropriate and nasal secretions for changes in amount and color  - [place] appropriate cooling/warming therapies per order  - Administer medications as ordered  - Instruct and encourage patient and family to use good hand hygiene technique  - Identify and instruct in appropriate isolation precautions for identified infection/condition  Outcome: Progressing     Problem: DISCHARGE PLANNING  Goal: Discharge to home or other facility with appropriate resources  Description: INTERVENTIONS:  - Identify barriers to discharge w/patient and caregiver  - Arrange for needed discharge resources and transportation as appropriate  - Identify discharge learning needs (meds, wound care, etc.)  - Arrange for interpretive services to assist at discharge as needed  - Refer to Case Management Department for coordinating discharge planning if the patient needs post-hospital services based on physician/advanced practitioner order or complex needs related to functional status, cognitive ability, or social support system  Outcome: Progressing      Problem: Knowledge Deficit  Goal: Patient/family/caregiver demonstrates understanding of disease process, treatment plan, medications, and discharge instructions  Description: Complete learning assessment and assess knowledge base.  Interventions:  - Provide teaching at level of understanding  - Provide teaching via preferred learning methods  Outcome: Progressing

## 2024-07-27 LAB
ATRIAL RATE: 75 BPM
ATRIAL RATE: 75 BPM
GLUCOSE SERPL-MCNC: 151 MG/DL (ref 65–140)
GLUCOSE SERPL-MCNC: 198 MG/DL (ref 65–140)
GLUCOSE SERPL-MCNC: 217 MG/DL (ref 65–140)
GLUCOSE SERPL-MCNC: 282 MG/DL (ref 65–140)
P AXIS: 35 DEGREES
P AXIS: 37 DEGREES
PR INTERVAL: 154 MS
PR INTERVAL: 158 MS
QRS AXIS: -22 DEGREES
QRS AXIS: -23 DEGREES
QRSD INTERVAL: 110 MS
QRSD INTERVAL: 114 MS
QT INTERVAL: 452 MS
QT INTERVAL: 460 MS
QTC INTERVAL: 504 MS
QTC INTERVAL: 513 MS
T WAVE AXIS: 17 DEGREES
T WAVE AXIS: 6 DEGREES
VENTRICULAR RATE: 75 BPM
VENTRICULAR RATE: 75 BPM

## 2024-07-27 PROCEDURE — 93010 ELECTROCARDIOGRAM REPORT: CPT | Performed by: INTERNAL MEDICINE

## 2024-07-27 PROCEDURE — 97110 THERAPEUTIC EXERCISES: CPT

## 2024-07-27 PROCEDURE — 97530 THERAPEUTIC ACTIVITIES: CPT

## 2024-07-27 PROCEDURE — 93005 ELECTROCARDIOGRAM TRACING: CPT

## 2024-07-27 PROCEDURE — 97542 WHEELCHAIR MNGMENT TRAINING: CPT

## 2024-07-27 PROCEDURE — 82948 REAGENT STRIP/BLOOD GLUCOSE: CPT

## 2024-07-27 RX ORDER — ONDANSETRON 4 MG/1
4 TABLET, ORALLY DISINTEGRATING ORAL EVERY 6 HOURS PRN
Status: DISCONTINUED | OUTPATIENT
Start: 2024-07-27 | End: 2024-08-01 | Stop reason: HOSPADM

## 2024-07-27 RX ADMIN — INSULIN GLARGINE 25 UNITS: 100 INJECTION, SOLUTION SUBCUTANEOUS at 21:22

## 2024-07-27 RX ADMIN — ASPIRIN 81 MG: 81 TABLET, COATED ORAL at 17:14

## 2024-07-27 RX ADMIN — INSULIN LISPRO 2 UNITS: 100 INJECTION, SOLUTION INTRAVENOUS; SUBCUTANEOUS at 12:11

## 2024-07-27 RX ADMIN — TRIMETHOBENZAMIDE HYDROCHLORIDE 200 MG: 100 INJECTION INTRAMUSCULAR at 09:52

## 2024-07-27 RX ADMIN — ONDANSETRON 4 MG: 4 TABLET, ORALLY DISINTEGRATING ORAL at 08:38

## 2024-07-27 RX ADMIN — INSULIN LISPRO 2 UNITS: 100 INJECTION, SOLUTION INTRAVENOUS; SUBCUTANEOUS at 16:31

## 2024-07-27 RX ADMIN — OXYCODONE HYDROCHLORIDE AND ACETAMINOPHEN 500 MG: 500 TABLET ORAL at 17:15

## 2024-07-27 RX ADMIN — LEVOTHYROXINE SODIUM 175 MCG: 100 TABLET ORAL at 05:45

## 2024-07-27 RX ADMIN — ATORVASTATIN CALCIUM 40 MG: 40 TABLET, FILM COATED ORAL at 17:15

## 2024-07-27 RX ADMIN — DAPTOMYCIN 575 MG: 500 INJECTION, POWDER, LYOPHILIZED, FOR SOLUTION INTRAVENOUS at 16:31

## 2024-07-27 NOTE — NURSING NOTE
Kelsy minimally effective at this time.  Was resting in bed with eyes closed when this nurse entered room but once he opened eyes he began with heaving again and bringing up clear mucous.  Dr. Depadua informed of status as Patient declining/unable to participate in therapy at this time.

## 2024-07-27 NOTE — QUICK NOTE
"Notified by nursing staff with nausea/vomiting, dry heaving, and dizziness \"room is spinning\". Zofran is ineffective. Will try one dose of IM tigan. Checking EKG to evaluate QT interval prior.   "

## 2024-07-27 NOTE — NURSING NOTE
Patient noted with nausea and vomiting during therapy session.  Complaint of dizziness and states room is spinning.  MD on call notified.  Order received for Zofran PO , same administered.  Refused breakfast due to nausea.  Blood sugar 151. Sliding scale insulin held under direction of on call MD.  Will continue to monitor

## 2024-07-27 NOTE — PLAN OF CARE
Problem: PAIN - ADULT  Goal: Verbalizes/displays adequate comfort level or baseline comfort level  Description: Interventions:  - Encourage patient to monitor pain and request assistance  - Assess pain using appropriate pain scale  - Administer analgesics based on type and severity of pain and evaluate response  - Implement non-pharmacological measures as appropriate and evaluate response  - Consider cultural and social influences on pain and pain management  - Notify physician/advanced practitioner if interventions unsuccessful or patient reports new pain  Outcome: Progressing     Problem: INFECTION - ADULT  Goal: Absence or prevention of progression during hospitalization  Description: INTERVENTIONS:  - Assess and monitor for signs and symptoms of infection  - Monitor lab/diagnostic results  - Monitor all insertion sites, i.e. indwelling lines, tubes, and drains  - Monitor endotracheal if appropriate and nasal secretions for changes in amount and color  - Greenback appropriate cooling/warming therapies per order  - Administer medications as ordered  - Instruct and encourage patient and family to use good hand hygiene technique  - Identify and instruct in appropriate isolation precautions for identified infection/condition  Outcome: Progressing

## 2024-07-27 NOTE — QUICK NOTE
Attempted OT session however Pt declined due to Pt with nausea and vomiting earlier this date and Pt reported continued episodes nausea and dizziness; Pt declined lunch due to nausea; Communication with nursing regarding Pt status

## 2024-07-27 NOTE — PROGRESS NOTES
"   07/27/24 0700   Pain Assessment   Pain Assessment Tool 0-10   Pain Score 7   Pain Location/Orientation Orientation: Right;Location: Knee   Restrictions/Precautions   Precautions Bed/chair alarms;Fall Risk;Limb alert;Pain;Supervision on toilet/commode  (orthostasis)   RLE Weight Bearing Per Order WBAT   ROM Restrictions No   Braces or Orthoses Other (Comment)  (R ankle air cast)   Cognition   Overall Cognitive Status WFL   Arousal/Participation Alert;Responsive;Cooperative   Attention Within functional limits   Orientation Level Oriented X4   Memory Within functional limits   Following Commands Follows all commands and directions without difficulty   Subjective   Subjective \"I'm stiff\"   Roll Left and Right   Type of Assistance Needed Independent   Physical Assistance Level No physical assistance   Comment HOB fla, no rails   Roll Left and Right CARE Score 6   Sit to Lying   Type of Assistance Needed Independent   Physical Assistance Level No physical assistance   Comment HOB flat, no rails   Sit to Lying CARE Score 6   Lying to Sitting on Side of Bed   Type of Assistance Needed Independent   Physical Assistance Level No physical assistance   Comment HOB flat, no rails   Lying to Sitting on Side of Bed CARE Score 6   Sit to Stand   Type of Assistance Needed Independent;Adaptive equipment   Physical Assistance Level No physical assistance   Comment Mod I /c RW. INcreased time rquired to complete   Sit to Stand CARE Score 6   Bed-Chair Transfer   Type of Assistance Needed Supervision   Physical Assistance Level No physical assistance   Comment /c and w/o AD   Chair/Bed-to-Chair Transfer CARE Score 4   Transfer Bed/Chair/Wheelchair   Limitations Noted In Balance;Endurance;Pain Management;UE Strength;LE Strength   Adaptive Equipment Roller Walker   Stand Pivot Supervision   Sit to Stand Modified Independent   Stand to Sit Modified Independent   Supine to Sit Independent   Sit to Supine Independent   Car Transfer " "  Reason if not Attempted Environmental limitations   Car Transfer CARE Score 10   Walk 10 Feet   Reason if not Attempted Medical concerns   Walk 10 Feet CARE Score 88   Walk 50 Feet with Two Turns   Reason if not Attempted Medical concerns   Walk 50 Feet with Two Turns CARE Score 88   Walk 150 Feet   Reason if not Attempted Safety concerns   Walk 150 Feet CARE Score 88   Walking 10 Feet on Uneven Surfaces   Reason if not Attempted Medical concerns   Walking 10 Feet on Uneven Surfaces CARE Score 88   Ambulation   Findings Unable to assess this session 2*  dizziness, \"cold sweats\", and (+) emesis   Does the patient walk? 2. Yes   Wheel 50 Feet with Two Turns   Type of Assistance Needed Independent   Physical Assistance Level No physical assistance   Wheel 50 Feet with Two Turns CARE Score 6   Wheel 150 Feet   Type of Assistance Needed Independent   Physical Assistance Level No physical assistance   Wheel 150 Feet CARE Score 6   Wheelchair mobility   Does the patient use a wheelchair? 1. Yes   Type of Wheelchair Used 1. Manual   Method Right upper extremity;Left upper extremity;Left lower extremity   Assistance Provided For Remove Leg Rest;Replace Leg Rest   Distance Level Surface (feet) 150 ft   Distance Wheeled 3% Grade 10 ft   Findings independent   Curb or Single Stair   Reason if not Attempted Medical concerns   1 Step (Curb) CARE Score 88   4 Steps   Reason if not Attempted Safety concerns   4 Steps CARE Score 88   12 Steps   Reason if not Attempted Activity not applicable   12 Steps CARE Score 9   Stairs   Findings Unable to assess this session 2* dizziness, \"cold sweats\", and (+) emesis   Picking Up Object   Reason if not Attempted Safety concerns   Picking Up Object CARE Score 88   Toilet Transfer   Comment Did not have to use BR during session   Therapeutic Interventions   Flexibility supine R LE gastroc, quad, hamstring stretch R LE [id number] each   Other bed mobility, w/c   Assessment   Treatment Assessment " "Pt received supine in bed and agreeable to PT session. VS assessed t/o session, refer to VS tab for details. (+) OH and c/o dizziness, RN aware. Attempted to assess BP in standing however unable to get reading and pt declined to re-attempt as well as to wear abdominal binder. Remains independent completing bed mobility w/o use of bed features. Transfer training completed focusing on sequence and technique for improved safety and balance /c functional mobility utilizing RW and w/o AD. Propelled w/c room>PT gym /c BUEs and L LE independently for general strengthening and endurance training to help improve overall functional mobility. Completed supine R LE stretching as noted in flowsheet /c good tolerance. While preforming stretching, pt started to c/o that \"room is spinning\" and \"cold sweat\", started to produce emesis; RN aware. Session concluded prematurely 2* above and returned to room. Unable to assess GT/stair training this session 2* medical concerns. Pt remains limited 2* decreased strength, endurance, balance, ROM, functional mobility, postural stability, safety awareness, increased pain, and symptomatic orthostasis. Pt would continue to benefit from skilled [facility] PT services as he is functioning below baseline as well as to maximize independence and safety /c MRADLs.   Problem List Decreased strength;Decreased range of motion;Decreased endurance;Impaired balance;Decreased mobility;Decreased safety awareness;Pain   Barriers to Discharge Inaccessible home environment   PT Barriers   Physical Impairment Decreased strength;Decreased range of motion;Decreased endurance;Impaired balance;Decreased safety awareness;Pain;Decreased mobility   Functional Limitation Walking;Transfers;Standing;Stair negotiation;Ramp negotiation;Car transfers   Plan   Treatment/Interventions Functional transfer training;LE strengthening/ROM;Elevations;Therapeutic exercise;Endurance training;Patient/family training;Equipment " eval/education;Bed mobility;Gait training;Compensatory technique education   Progress Progressing toward goals   PT Therapy Minutes   PT Time In 0700   PT Time Out 0800   PT Total Time (minutes) 60   PT Mode of treatment - Individual (minutes) 60   PT Mode of treatment - Concurrent (minutes) 0   PT Mode of treatment - Group (minutes) 0   PT Mode of treatment - Co-treat (minutes) 0   PT Mode of Treatment - Total time(minutes) 60 minutes   PT Cumulative Minutes 597   Therapy Time missed   Time missed? No

## 2024-07-27 NOTE — NURSING NOTE
Zofran ineffective at this time as patient continues with nausea and vomiting.  Loose stools also noted at this time.  Continues with dizziness AEB room spinning.  States he ordered out for dinner last evening and wonders if he has food poisoning.  MD updated .

## 2024-07-27 NOTE — NURSING NOTE
Nausea and vomiting subsided prior to noon meal but patient declined to eat meal due to fear to resurgence of nausea/vomiting. Currently resting quietly in bed. Safety maintained. Call bell in reach

## 2024-07-28 LAB
BACTERIA SPEC ANAEROBE CULT: NO GROWTH
BACTERIA TISS AEROBE CULT: NO GROWTH
GLUCOSE SERPL-MCNC: 161 MG/DL (ref 65–140)
GLUCOSE SERPL-MCNC: 182 MG/DL (ref 65–140)
GLUCOSE SERPL-MCNC: 236 MG/DL (ref 65–140)
GLUCOSE SERPL-MCNC: 252 MG/DL (ref 65–140)
GRAM STN SPEC: NORMAL

## 2024-07-28 PROCEDURE — 82948 REAGENT STRIP/BLOOD GLUCOSE: CPT

## 2024-07-28 PROCEDURE — 97535 SELF CARE MNGMENT TRAINING: CPT

## 2024-07-28 PROCEDURE — 97530 THERAPEUTIC ACTIVITIES: CPT

## 2024-07-28 RX ORDER — SODIUM CHLORIDE 9 MG/ML
100 INJECTION, SOLUTION INTRAVENOUS CONTINUOUS
Status: DISPENSED | OUTPATIENT
Start: 2024-07-28 | End: 2024-07-28

## 2024-07-28 RX ADMIN — INSULIN LISPRO 1 UNITS: 100 INJECTION, SOLUTION INTRAVENOUS; SUBCUTANEOUS at 16:14

## 2024-07-28 RX ADMIN — INSULIN GLARGINE 25 UNITS: 100 INJECTION, SOLUTION SUBCUTANEOUS at 21:28

## 2024-07-28 RX ADMIN — LEVOTHYROXINE SODIUM 175 MCG: 100 TABLET ORAL at 05:37

## 2024-07-28 RX ADMIN — ESCITALOPRAM OXALATE 5 MG: 5 TABLET, FILM COATED ORAL at 08:07

## 2024-07-28 RX ADMIN — ASPIRIN 81 MG: 81 TABLET, COATED ORAL at 17:10

## 2024-07-28 RX ADMIN — ATORVASTATIN CALCIUM 40 MG: 40 TABLET, FILM COATED ORAL at 17:10

## 2024-07-28 RX ADMIN — OXYCODONE HYDROCHLORIDE AND ACETAMINOPHEN 500 MG: 500 TABLET ORAL at 08:07

## 2024-07-28 RX ADMIN — DAPTOMYCIN 575 MG: 500 INJECTION, POWDER, LYOPHILIZED, FOR SOLUTION INTRAVENOUS at 15:27

## 2024-07-28 RX ADMIN — INSULIN GLARGINE 25 UNITS: 100 INJECTION, SOLUTION SUBCUTANEOUS at 08:08

## 2024-07-28 RX ADMIN — ACETAMINOPHEN 975 MG: 325 TABLET ORAL at 21:39

## 2024-07-28 RX ADMIN — SODIUM CHLORIDE 100 ML/HR: 0.9 INJECTION, SOLUTION INTRAVENOUS at 11:40

## 2024-07-28 RX ADMIN — INSULIN LISPRO 3 UNITS: 100 INJECTION, SOLUTION INTRAVENOUS; SUBCUTANEOUS at 11:41

## 2024-07-28 RX ADMIN — INSULIN LISPRO 1 UNITS: 100 INJECTION, SOLUTION INTRAVENOUS; SUBCUTANEOUS at 08:08

## 2024-07-28 RX ADMIN — OXYCODONE HYDROCHLORIDE AND ACETAMINOPHEN 500 MG: 500 TABLET ORAL at 17:10

## 2024-07-28 RX ADMIN — ASPIRIN 81 MG: 81 TABLET, COATED ORAL at 08:08

## 2024-07-28 RX ADMIN — ONDANSETRON 4 MG: 4 TABLET, ORALLY DISINTEGRATING ORAL at 08:08

## 2024-07-28 NOTE — PROGRESS NOTES
07/28/24 0932   Pain Assessment   Pain Assessment Tool 0-10   Pain Score No Pain   Restrictions/Precautions   Precautions Bed/chair alarms;Fall Risk;Limb alert;Multiple lines;Supervision on toilet/commode  (RUE PICC)   RLE Weight Bearing Per Order WBAT   ROM Restrictions No   Shower/Bathe Self   Type of Assistance Needed Set-up / clean-up   Physical Assistance Level No physical assistance   Comment completed sponge bath at sink, UB and perineal/buttocks only   Shower/Bathe Self CARE Score 5   Bathing   Assessed Bath Style Sponge Bath   Anticipated D/C Bath Style Sponge Bath;Shower   Able to Gather/Transport No   Able to Wash/Rinse/Dry (body part) Left Arm;Right Arm;Chest;Abdomen;Perineal Area;Buttocks   Limitations Noted in Balance;Endurance;Safety;Strength   Positioning Seated  (shifted side to side to wash buttocks)   Upper Body Dressing   Type of Assistance Needed Independent   Physical Assistance Level No physical assistance   Upper Body Dressing CARE Score 6   Lower Body Dressing   Type of Assistance Needed Supervision   Physical Assistance Level No physical assistance   Comment completed supine in bed   Lower Body Dressing CARE Score 4   Putting On/Taking Off Footwear   Type of Assistance Needed Supervision   Physical Assistance Level No physical assistance   Comment completed supine in bed   Putting On/Taking Off Footwear CARE Score 4   Dressing/Undressing Clothing   Remove UB Clothes Pullover Shirt   Don UB Clothes Pullover Shirt   Remove LB Clothes Shorts;Undergarment;Socks   Don LB Clothes Shorts;Undergarment;Socks   Limitations Noted In Balance;Endurance;Safety;Strength;ROM   Positioning Supported Sit;In Bed   Sit to Lying   Type of Assistance Needed Independent   Physical Assistance Level No physical assistance   Sit to Lying CARE Score 6   Sit to Stand   Type of Assistance Needed Supervision   Physical Assistance Level No physical assistance   Sit to Stand CARE Score 4   Bed-Chair Transfer   Type of  Assistance Needed Supervision   Physical Assistance Level No physical assistance   Comment sit pivot   Chair/Bed-to-Chair Transfer CARE Score 4   Transfer Bed/Chair/Wheelchair   Limitations Noted In Balance;Endurance;UE Strength;LE Strength   Functional Standing Tolerance   Time 55s   Activity static standing   Comments stood with bilat support on table of bilat UE/fingertips for static standing practice and encouragement to bear equal weight in bilat LE, pt able to weight shift from bilat LE's when standing, pt reported dizziness during standing and requested to sit; BP checked, 74/58 and RN notified   Cognition   Overall Cognitive Status WFL   Arousal/Participation Alert;Responsive;Cooperative   Attention Within functional limits   Orientation Level Oriented X4   Memory Within functional limits   Following Commands Follows all commands and directions without difficulty   Assessment   Treatment Assessment Pt agreeable to OT session this AM. Received sitting upright in w/c. Pt reported feeling much better than yesterday with only slight fatigue and occasional diarrhea noted. ADL session completed; current LOF and details listed in respective sections. Pt is overall mod I UB dressing, set up bathing, supervision LB dressing; fxl transfers overall supervision with slightly increased time for set up. W/c mobility completed to and from OT room and around room during session with pt using bilat UE and LE to propel, overall distant supervision level. Pt requested to complete static standing exercise, good balance noted with minimal pain however pt reported increase in dizziness after standing; BP checked and 74/58, RN notified. Pt returned to recliner with bilat LE elevated to raise BP. Despite dizziness and low BP pt is an active and motivated participant in therapy and is eager to gain independence. Pt's barriers to d/c include decreased strength throughout but especially RLE s/p TKA, decreased balance, mildly impaired  safety awareness, and decreased activity tolerance; all affect independence in self care and fxl transfers. Pt would benefit from continued skilled OT services in order to address listed barriers and prepare for safe d/c.   Prognosis Good   Problem List Decreased strength;Decreased range of motion;Decreased endurance;Impaired balance;Decreased mobility;Decreased safety awareness;Pain   Plan   Treatment/Interventions ADL retraining;Functional transfer training;LE strengthening/ROM;Endurance training;Therapeutic exercise;Equipment eval/education;Patient/family training;Compensatory technique education;OT   Progress Progressing toward goals   OT Therapy Minutes   OT Time In 0932   OT Time Out 1032   OT Total Time (minutes) 60   OT Mode of treatment - Individual (minutes) 60

## 2024-07-28 NOTE — QUICK NOTE
PMR Quick Note    Contacted by nursing. Patient ate 100% of dinner and breakfast last night. N/V have largely subsided, but had diarrhea this AM. Today with orthostasis in OT and dizziness with SBP in the 70s. Did not have dizziness when checking orthostatics with nursing, but orthostatics were positive dropping from  to 81. Remains afebrile. Will give him some IVF resuscitation today, check labs in the AM.     Ashley de Padua, MD  Physical Medicine and Rehabilitation

## 2024-07-28 NOTE — NURSING NOTE
Informed by OT that patient had dizziness during sessions with standing BP of 74/58.  Orthostatic BP checked by this nurse.  Sitting 116/61.  Standing 81/50.  Denied complaints of significant dizziness during orthostatic check. Refuses to use abdominal binder .  States he had diarrhea this am when in BR.  Md notified.  New orders received for IV fluids and patient agreeable to same.

## 2024-07-29 ENCOUNTER — TELEPHONE (OUTPATIENT)
Age: 59
End: 2024-07-29

## 2024-07-29 PROBLEM — K52.9 GASTROENTERITIS: Status: ACTIVE | Noted: 2024-07-29

## 2024-07-29 LAB
ALBUMIN SERPL BCG-MCNC: 2.9 G/DL (ref 3.5–5)
ALP SERPL-CCNC: 136 U/L (ref 34–104)
ALT SERPL W P-5'-P-CCNC: 27 U/L (ref 7–52)
ANION GAP SERPL CALCULATED.3IONS-SCNC: 5 MMOL/L (ref 4–13)
AST SERPL W P-5'-P-CCNC: 30 U/L (ref 13–39)
BACTERIA SPEC ANAEROBE CULT: NO GROWTH
BACTERIA TISS AEROBE CULT: NO GROWTH
BASOPHILS # BLD AUTO: 0.08 THOUSANDS/ÂΜL (ref 0–0.1)
BASOPHILS NFR BLD AUTO: 1 % (ref 0–1)
BILIRUB SERPL-MCNC: 0.41 MG/DL (ref 0.2–1)
BUN SERPL-MCNC: 19 MG/DL (ref 5–25)
CALCIUM ALBUM COR SERPL-MCNC: 9.3 MG/DL (ref 8.3–10.1)
CALCIUM SERPL-MCNC: 8.4 MG/DL (ref 8.4–10.2)
CHLORIDE SERPL-SCNC: 106 MMOL/L (ref 96–108)
CK SERPL-CCNC: 217 U/L (ref 39–308)
CO2 SERPL-SCNC: 27 MMOL/L (ref 21–32)
CREAT SERPL-MCNC: 0.65 MG/DL (ref 0.6–1.3)
EOSINOPHIL # BLD AUTO: 1.83 THOUSAND/ÂΜL (ref 0–0.61)
EOSINOPHIL NFR BLD AUTO: 13 % (ref 0–6)
ERYTHROCYTE [DISTWIDTH] IN BLOOD BY AUTOMATED COUNT: 13.8 % (ref 11.6–15.1)
FUNGUS SPEC CULT: NORMAL
GFR SERPL CREATININE-BSD FRML MDRD: 105 ML/MIN/1.73SQ M
GLUCOSE P FAST SERPL-MCNC: 157 MG/DL (ref 65–99)
GLUCOSE SERPL-MCNC: 151 MG/DL (ref 65–140)
GLUCOSE SERPL-MCNC: 154 MG/DL (ref 65–140)
GLUCOSE SERPL-MCNC: 154 MG/DL (ref 65–140)
GLUCOSE SERPL-MCNC: 157 MG/DL (ref 65–140)
GLUCOSE SERPL-MCNC: 238 MG/DL (ref 65–140)
GRAM STN SPEC: NORMAL
HCT VFR BLD AUTO: 30.7 % (ref 36.5–49.3)
HGB BLD-MCNC: 9.7 G/DL (ref 12–17)
IMM GRANULOCYTES # BLD AUTO: 0.12 THOUSAND/UL (ref 0–0.2)
IMM GRANULOCYTES NFR BLD AUTO: 1 % (ref 0–2)
LYMPHOCYTES # BLD AUTO: 2.4 THOUSANDS/ÂΜL (ref 0.6–4.47)
LYMPHOCYTES NFR BLD AUTO: 17 % (ref 14–44)
MCH RBC QN AUTO: 29.6 PG (ref 26.8–34.3)
MCHC RBC AUTO-ENTMCNC: 31.6 G/DL (ref 31.4–37.4)
MCV RBC AUTO: 94 FL (ref 82–98)
MONOCYTES # BLD AUTO: 1.48 THOUSAND/ÂΜL (ref 0.17–1.22)
MONOCYTES NFR BLD AUTO: 10 % (ref 4–12)
NEUTROPHILS # BLD AUTO: 8.27 THOUSANDS/ÂΜL (ref 1.85–7.62)
NEUTS SEG NFR BLD AUTO: 58 % (ref 43–75)
NRBC BLD AUTO-RTO: 0 /100 WBCS
PLATELET # BLD AUTO: 495 THOUSANDS/UL (ref 149–390)
PMV BLD AUTO: 10.9 FL (ref 8.9–12.7)
POTASSIUM SERPL-SCNC: 4.1 MMOL/L (ref 3.5–5.3)
PROT SERPL-MCNC: 6.1 G/DL (ref 6.4–8.4)
RBC # BLD AUTO: 3.28 MILLION/UL (ref 3.88–5.62)
SODIUM SERPL-SCNC: 138 MMOL/L (ref 135–147)
WBC # BLD AUTO: 14.18 THOUSAND/UL (ref 4.31–10.16)

## 2024-07-29 PROCEDURE — 85025 COMPLETE CBC W/AUTO DIFF WBC: CPT

## 2024-07-29 PROCEDURE — 82948 REAGENT STRIP/BLOOD GLUCOSE: CPT

## 2024-07-29 PROCEDURE — 97530 THERAPEUTIC ACTIVITIES: CPT

## 2024-07-29 PROCEDURE — 99233 SBSQ HOSP IP/OBS HIGH 50: CPT | Performed by: PHYSICAL MEDICINE & REHABILITATION

## 2024-07-29 PROCEDURE — 82550 ASSAY OF CK (CPK): CPT

## 2024-07-29 PROCEDURE — 97110 THERAPEUTIC EXERCISES: CPT

## 2024-07-29 PROCEDURE — 97110 THERAPEUTIC EXERCISES: CPT | Performed by: PHYSICAL THERAPIST

## 2024-07-29 PROCEDURE — 97116 GAIT TRAINING THERAPY: CPT | Performed by: PHYSICAL THERAPIST

## 2024-07-29 PROCEDURE — 97530 THERAPEUTIC ACTIVITIES: CPT | Performed by: PHYSICAL THERAPIST

## 2024-07-29 PROCEDURE — 97535 SELF CARE MNGMENT TRAINING: CPT

## 2024-07-29 PROCEDURE — 80053 COMPREHEN METABOLIC PANEL: CPT

## 2024-07-29 PROCEDURE — 97112 NEUROMUSCULAR REEDUCATION: CPT | Performed by: PHYSICAL THERAPIST

## 2024-07-29 RX ORDER — HYDROMORPHONE HYDROCHLORIDE 2 MG/1
2 TABLET ORAL EVERY 4 HOURS PRN
Status: DISCONTINUED | OUTPATIENT
Start: 2024-07-29 | End: 2024-08-01 | Stop reason: HOSPADM

## 2024-07-29 RX ORDER — ACETAMINOPHEN 325 MG/1
975 TABLET ORAL EVERY 8 HOURS PRN
Status: DISCONTINUED | OUTPATIENT
Start: 2024-07-29 | End: 2024-08-01 | Stop reason: HOSPADM

## 2024-07-29 RX ORDER — SODIUM CHLORIDE 9 MG/ML
100 INJECTION, SOLUTION INTRAVENOUS ONCE
Status: COMPLETED | OUTPATIENT
Start: 2024-07-29 | End: 2024-07-29

## 2024-07-29 RX ADMIN — ATORVASTATIN CALCIUM 40 MG: 40 TABLET, FILM COATED ORAL at 17:21

## 2024-07-29 RX ADMIN — ESCITALOPRAM OXALATE 5 MG: 5 TABLET, FILM COATED ORAL at 08:18

## 2024-07-29 RX ADMIN — OXYCODONE HYDROCHLORIDE AND ACETAMINOPHEN 500 MG: 500 TABLET ORAL at 17:21

## 2024-07-29 RX ADMIN — INSULIN GLARGINE 25 UNITS: 100 INJECTION, SOLUTION SUBCUTANEOUS at 08:18

## 2024-07-29 RX ADMIN — PANTOPRAZOLE SODIUM 40 MG: 40 TABLET, DELAYED RELEASE ORAL at 08:18

## 2024-07-29 RX ADMIN — DAPTOMYCIN 575 MG: 500 INJECTION, POWDER, LYOPHILIZED, FOR SOLUTION INTRAVENOUS at 15:29

## 2024-07-29 RX ADMIN — ASPIRIN 81 MG: 81 TABLET, COATED ORAL at 08:18

## 2024-07-29 RX ADMIN — ASPIRIN 81 MG: 81 TABLET, COATED ORAL at 17:21

## 2024-07-29 RX ADMIN — LEVOTHYROXINE SODIUM 175 MCG: 100 TABLET ORAL at 05:46

## 2024-07-29 RX ADMIN — ACETAMINOPHEN 975 MG: 325 TABLET ORAL at 14:23

## 2024-07-29 RX ADMIN — INSULIN LISPRO 1 UNITS: 100 INJECTION, SOLUTION INTRAVENOUS; SUBCUTANEOUS at 17:21

## 2024-07-29 RX ADMIN — OXYCODONE HYDROCHLORIDE AND ACETAMINOPHEN 500 MG: 500 TABLET ORAL at 08:18

## 2024-07-29 RX ADMIN — SODIUM CHLORIDE 100 ML/HR: 0.9 INJECTION, SOLUTION INTRAVENOUS at 10:17

## 2024-07-29 RX ADMIN — INSULIN LISPRO 1 UNITS: 100 INJECTION, SOLUTION INTRAVENOUS; SUBCUTANEOUS at 12:02

## 2024-07-29 RX ADMIN — INSULIN LISPRO 1 UNITS: 100 INJECTION, SOLUTION INTRAVENOUS; SUBCUTANEOUS at 08:18

## 2024-07-29 RX ADMIN — INSULIN GLARGINE 25 UNITS: 100 INJECTION, SOLUTION SUBCUTANEOUS at 20:33

## 2024-07-29 NOTE — CASE MANAGEMENT
GAL requested by Dr. Marks to inquire about pot op ortho appt. GAL called  [facility], spoke to Patti, who stated she will investigate and call CM back. GAL received a phone call from Angela, who scheduled post op appt to see Dr. Hendrix on 8/12 at 12:45 [facility].

## 2024-07-29 NOTE — NURSING NOTE
Lab changes communicated with MD/PA at morning stand up. PICC dressing CDI, flushed with ease. No c/o of pain. Wound care completed, pt tolerated well. Transfers Ax1 RW. Pt currently in PT, all needs met

## 2024-07-29 NOTE — TELEPHONE ENCOUNTER
Hello,    Please advise if a forced appointment can be accommodated for the patient:    Call back #: [phone] ([facility])    Insurance: Medicare    Reason for appointment: post op surgery 7/15    Requested doctor and/or location: [facility]      Thank you.

## 2024-07-29 NOTE — PROGRESS NOTES
07/29/24 1030   Pain Assessment   Pain Assessment Tool 0-10   Pain Score 6   Pain Location/Orientation Orientation: Right;Location: Knee   Restrictions/Precautions   Precautions Bed/chair alarms;Fall Risk;Limb alert;Multiple lines;Supervision on toilet/commode   RLE Weight Bearing Per Order WBAT   Cognition   Overall Cognitive Status WFL   Orientation Level Oriented X4   Subjective   Subjective I was really stiff this a.m.   Roll Left and Right   Type of Assistance Needed Independent   Physical Assistance Level No physical assistance   Roll Left and Right CARE Score 6   Sit to Lying   Type of Assistance Needed Independent   Physical Assistance Level No physical assistance   Sit to Lying CARE Score 6   Lying to Sitting on Side of Bed   Type of Assistance Needed Independent   Physical Assistance Level No physical assistance   Lying to Sitting on Side of Bed CARE Score 6   Sit to Stand   Type of Assistance Needed Supervision   Physical Assistance Level No physical assistance   Comment No s/s of dizziness   Sit to Stand CARE Score 4   Bed-Chair Transfer   Type of Assistance Needed Supervision   Physical Assistance Level No physical assistance   Comment sit/stand pivot   Chair/Bed-to-Chair Transfer CARE Score 4   Car Transfer   Reason if not Attempted Environmental limitations   Car Transfer CARE Score 10   Walk 10 Feet   Type of Assistance Needed Incidental touching   Physical Assistance Level No physical assistance   Comment RW   Walk 10 Feet CARE Score 4   Walk 50 Feet with Two Turns   Type of Assistance Needed Incidental touching   Physical Assistance Level No physical assistance   Comment RW   Walk 50 Feet with Two Turns CARE Score 4   Walk 150 Feet   Comment Fatigue   Reason if not Attempted Safety concerns   Walk 150 Feet CARE Score 88   Walking 10 Feet on Uneven Surfaces   Type of Assistance Needed Incidental touching   Physical Assistance Level No physical assistance   Comment RW, CGA, green foam   Walking 10  Feet on Uneven Surfaces CARE Score 4   Ambulation   Primary Mode of Locomotion Prior to Admission Walk   Distance Walked (feet) 125 ft  (125 x 2)   Assist Device Roller Walker   Does the patient walk? 2. Yes   Wheel 50 Feet with Two Turns   Type of Assistance Needed Independent   Physical Assistance Level No physical assistance   Wheel 50 Feet with Two Turns CARE Score 6   Wheel 150 Feet   Type of Assistance Needed Independent   Physical Assistance Level No physical assistance   Wheel 150 Feet CARE Score 6   Wheelchair mobility   Does the patient use a wheelchair? 1. Yes   Type of Wheelchair Used 1. Manual   Method Right upper extremity;Left upper extremity;Left lower extremity   Curb or Single Stair   Style negotiated Single stair   Type of Assistance Needed Physical assistance   Physical Assistance Level 26%-50%   Comment min-mod A, up/down 10 reps each LE   1 Step (Curb) CARE Score 3   4 Steps   Reason if not Attempted Safety concerns   4 Steps CARE Score 88   12 Steps   Reason if not Attempted Activity not applicable   12 Steps CARE Score 9   Picking Up Object   Reason if not Attempted Safety concerns   Picking Up Object CARE Score 88   Toilet Transfer   Comment Not needed during sessoin   Therapeutic Interventions   Strengthening Supine LE TE   Assessment   Treatment Assessment Patient continues to make progress with program, improved ambulation tolerance.  Does continue to fatigue easily.  In need of ongoing interventions to maximize return from PLOF.   Problem List Decreased strength;Decreased range of motion;Decreased endurance;Impaired balance;Decreased safety awareness;Pain   PT Barriers   Physical Impairment Decreased strength;Decreased range of motion;Decreased endurance;Impaired balance;Decreased safety awareness;Pain;Decreased mobility   Functional Limitation Walking;Transfers;Standing;Stair negotiation;Ramp negotiation;Car transfers   Plan   Treatment/Interventions Functional transfer training;LE  strengthening/ROM;Elevations;Therapeutic exercise;Endurance training;Patient/family training;Equipment eval/education;Bed mobility;Gait training   Progress Progressing toward goals   PT Therapy Minutes   PT Time In 1030   PT Time Out 1200   PT Total Time (minutes) 90   PT Mode of treatment - Individual (minutes) 90   PT Mode of treatment - Concurrent (minutes) 0   PT Mode of treatment - Group (minutes) 0   PT Mode of treatment - Co-treat (minutes) 0   PT Mode of Treatment - Total time(minutes) 90 minutes   PT Cumulative Minutes 687     Patient remains OOB in chair, all needs in reach.  Set up for lunch. Alarm in place and activated.  Encouraged use of call bell, patient verbalizes understanding.

## 2024-07-29 NOTE — PLAN OF CARE
Problem: PAIN - ADULT  Goal: Verbalizes/displays adequate comfort level or baseline comfort level  Description: Interventions:  - Encourage patient to monitor pain and request assistance  - Assess pain using appropriate pain scale  - Administer analgesics based on type and severity of pain and evaluate response  - Implement non-pharmacological measures as appropriate and evaluate response  - Notify physician/advanced practitioner if interventions unsuccessful or patient reports new pain  Outcome: Progressing     Problem: SAFETY ADULT  Goal: Patient will remain free of falls  Description: INTERVENTIONS:  - Educate patient/family on patient safety including physical limitations  - Instruct patient to call for assistance with activity   - Consult OT/PT to assist with strengthening/mobility   - Keep Call bell within reach  - Keep bed low and locked with side rails adjusted as appropriate  - Keep care items and personal belongings within reach  - Initiate and maintain comfort rounds  - Make Fall Risk Sign visible to staff  - Offer Toileting every 2 Hours, in advance of need  - Initiate/Maintain bed/chair alarm  - Apply yellow socks and bracelet for high fall risk patients    Outcome: Progressing

## 2024-07-29 NOTE — PROGRESS NOTES
07/29/24 0900   Pain Assessment   Pain Assessment Tool 0-10   Pain Score 7   Pain Location/Orientation Orientation: Right;Location: Knee   Restrictions/Precautions   Precautions Bed/chair alarms;Fall Risk;Limb alert;Multiple lines;Supervision on toilet/commode   RLE Weight Bearing Per Order WBAT   ROM Restrictions No   Oral Hygiene   Type of Assistance Needed Independent   Physical Assistance Level No physical assistance   Comment w/c level at sink   Oral Hygiene CARE Score 6   Grooming   Able To Initiate Tasks;Acquire Items;Wash/Dry Face;Brush/Clean Teeth   Limitation Noted In Safety;Strength   Shower/Bathe Self   Type of Assistance Needed Set-up / clean-up   Physical Assistance Level No physical assistance   Shower/Bathe Self CARE Score 5   Bathing   Assessed Bath Style Shower   Anticipated D/C Bath Style Shower;Sponge Bath   Able to Gather/Transport No   Able to Adjust Water Temperature Yes   Able to Wash/Rinse/Dry (body part) Left Arm;Right Arm;L Upper Leg;R Upper Leg;Chest;L Lower Leg/Foot;Abdomen;Perineal Area;Buttocks  (R lower LE covered in dressing)   Limitations Noted in Balance;Endurance;Safety;Strength   Positioning Seated   Adaptive Equipment Shower Bars;Shower Seat;Hand Held Shower   Tub/Shower Transfer   Limitations Noted In Balance;Endurance;Safety;LE Strength   Adaptive Equipment Grab Bars;Seat with Back   Assessed Shower   Findings close supervision using walk-in shower with small ledge   Upper Body Dressing   Type of Assistance Needed Independent   Physical Assistance Level No physical assistance   Upper Body Dressing CARE Score 6   Lower Body Dressing   Type of Assistance Needed Set-up / clean-up   Physical Assistance Level No physical assistance   Lower Body Dressing CARE Score 5   Putting On/Taking Off Footwear   Type of Assistance Needed Set-up / clean-up   Physical Assistance Level No physical assistance   Putting On/Taking Off Footwear CARE Score 5   Dressing/Undressing Clothing   Remove  UB Clothes Pullover Shirt   Don UB Clothes Pullover Shirt   Remove LB Clothes Shorts;Undergarment;Shoes   Don LB Clothes Shorts;Undergarment;Socks;Shoes   Limitations Noted In Balance;Endurance;Safety;Strength;ROM   Positioning Supported Sit   Sit to Lying   Type of Assistance Needed Independent   Physical Assistance Level No physical assistance   Comment during orthostatic BP's   Sit to Lying CARE Score 6   Lying to Sitting on Side of Bed   Type of Assistance Needed Independent   Physical Assistance Level No physical assistance   Comment during orthostatic BP's   Lying to Sitting on Side of Bed CARE Score 6   Sit to Stand   Type of Assistance Needed Supervision   Physical Assistance Level No physical assistance   Sit to Stand CARE Score 4   Bed-Chair Transfer   Type of Assistance Needed Supervision   Physical Assistance Level No physical assistance   Chair/Bed-to-Chair Transfer CARE Score 4   Transfer Bed/Chair/Wheelchair   Limitations Noted In Balance;Endurance;LE Strength   Toileting Hygiene   Type of Assistance Needed Supervision   Physical Assistance Level No physical assistance   Comment distant supervision   Toileting Hygiene CARE Score 4   Toileting   Able to Pull Clothing down yes, up yes.   Manage Hygiene Bladder;Bowel   Limitations Noted In Balance;Safety;LE Strength   Adaptive Equipment Grab Bar   Toilet Transfer   Type of Assistance Needed Supervision   Physical Assistance Level No physical assistance   Toilet Transfer CARE Score 4   Toilet Transfer   Surface Assessed Raised Toilet   Transfer Technique Standard   Limitations Noted In Balance;Endurance;ROM;Safety;LE Strength   Adaptive Equipment Grab Bar;Walker   Exercise Tools   Other Exercise Tool 2 2x15 pronation/supination and internal/external rotation using red flexbar   Cognition   Overall Cognitive Status WFL   Arousal/Participation Alert;Responsive;Cooperative   Attention Within functional limits   Orientation Level Oriented X4   Memory Within  functional limits   Following Commands Follows all commands and directions without difficulty   Additional Activities   Additional Activities Comments pt requested to complete RLE ankle stretches after ADL, x5 completed to approx 30 sec with R ankle in flexion as much as pt's tolerance allowed with pt reporting positive stretching in gastrocnemius/soleus into knee   Assessment   Treatment Assessment Pt agreeable to OT session this AM. Received sitting EOB. ADL session completed; current LOF and details listed in respective sections. Pt is overall mod I UB dressing and grooming, set up/supervision for LB dressing. toileting, and bathing; fxl transfers overall supervision. Pt reported high-moderate pain in RLE that remained consistent during session. Pt completed UE strengthening and RLE stretching exercises after ADL with good tolerance. Pt is an active and motivated participant in therapy and is eager to continue improving. Pt's barriers to d/c include decreased strength throughout but especially RLE s/p TKA, decreased balance, mildly impaired safety awareness, and decreased activity tolerance; all affect independence in self care and fxl transfers. Pt would benefit from continued skilled OT services in order to address listed barriers and prepare for safe d/c.   Prognosis Good   Problem List Decreased strength;Decreased range of motion;Decreased endurance;Impaired balance;Decreased safety awareness;Pain   Plan   Treatment/Interventions ADL retraining;Functional transfer training;LE strengthening/ROM;Therapeutic exercise;Endurance training;Patient/family training;Equipment eval/education;Compensatory technique education;OT   Progress Progressing toward goals   OT Therapy Minutes   OT Time In 0900   OT Time Out 1030   OT Total Time (minutes) 90   OT Mode of treatment - Individual (minutes) 90

## 2024-07-29 NOTE — PROGRESS NOTES
07/29/24 1340   Pain Assessment   Pain Assessment Tool 0-10   Pain Score 6   Pain Location/Orientation Orientation: Right;Location: Knee  (ankle)   Restrictions/Precautions   Precautions Bed/chair alarms;Fall Risk;Limb alert;Multiple lines;Supervision on toilet/commode   RLE Weight Bearing Per Order WBAT   ROM Restrictions No   Braces or Orthoses   (abdominal binder to be worn during therapy)   Bed-Chair Transfer   Type of Assistance Needed Supervision   Physical Assistance Level No physical assistance   Chair/Bed-to-Chair Transfer CARE Score 4   Transfer Bed/Chair/Wheelchair   Limitations Noted In Balance;Endurance;LE Strength   Exercise Tools   UE Ergometer 5min forward, 5min backwards   Other Exercise Tool 1 UE strengthening in bilat UE using 2# DB, 2x15: elbow flexion/extension, protraction/retraction, internal/external rotation, pronation/supination, shoulder elevation/depression   Cognition   Overall Cognitive Status WFL   Arousal/Participation Alert;Responsive;Cooperative   Attention Within functional limits   Orientation Level Oriented X4   Memory Within functional limits   Following Commands Follows all commands and directions without difficulty   Assessment   Treatment Assessment Pt seen for brief OT session this PM focusing on UE ROM/strength and fxl transfers. Pt tolerated exercises well, reporting increasing fatigue as session progressed. Moderate pain reported in R knee and ankle. Pt returned to EOB at end of session with supervision; reports feelings increasingly confident in fxl transfers and R knee mobility. Pt's barriers to d/c include decreased strength throughout but especially RLE s/p TKA, decreased balance, mildly impaired safety awareness, and decreased activity tolerance; all affect independence in self care and fxl transfers. Pt would benefit from continued skilled OT services in order to address listed barriers and prepare for safe d/c.   Prognosis Good   Problem List Decreased  strength;Decreased range of motion;Decreased endurance;Impaired balance;Decreased safety awareness;Pain   Plan   Treatment/Interventions ADL retraining;Functional transfer training;LE strengthening/ROM;Therapeutic exercise;Endurance training;Patient/family training;Equipment eval/education;Compensatory technique education;OT   Progress Progressing toward goals   OT Therapy Minutes   OT Time In 1340   OT Time Out 1409   OT Total Time (minutes) 29   OT Mode of treatment - Individual (minutes) 29

## 2024-07-29 NOTE — PROGRESS NOTES
PM&R PROGRESS NOTE:  Aaron Richards 59 y.o. male MRN: [id number]  Unit/Bed#: -01 Encounter: [id number]        Rehab Diagnosis: Orthopedic Disorders:  08.61  Unilateral Knee Replacement  Etiologic Diagnosis:   s/p R antibiotic cement TKA with Dr. 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 Prather: 59 year old M with PMH of HTN, PAD, R TMA, HLD, DM2, CKD, GERD, diabetic neuropathy who had history of traumatic HW failure requiring removal of HW and later developed sinus track extending into jt space with op finding of septic arthritis who underwent complicated R hand crafted abx cement TKA with augmentation by Dr Hendrix on 7/15. ID consulted and recommended IV Dapto for 6 week course with closely monitoring labs. Post-op course notable for pain, leukocytosis, anemia, and significant decline in ADLs and mobility.     SUBJECTIVE: Patient seen face to face this morning.  Over the weekend had significant nausea, vomiting, diarrhea -patient believes this was due to food poisoning.  Today denies abdominal pain, cramping, nausea, chest pain, shortness of breath.  Along this weekend was orthostatic and received 1 L IV fluids.  Discussed with patient today.  Patient to receive more IV fluid hydration today and encourage patient to utilize abdominal binder during therapies only which she is agreeable to.  We will resume Ace wrap to the right lower extremity additionally.  Patient doing well from a pain standpoint and only utilizing Tylenol as needed now mainly.    ASSESSMENT: Stable, progressing      PLAN:    Rehabilitation  Continue current rehabilitation plan of care to maximize function.    Estimated Discharge: Thursday, 8/1/2024 with home health care RN, PT, OT    DVT prophylaxis  Continue aspirin 81 mg twice daily-per orthopedics    Bladder plan  Continent and voiding    Bowel plan  Continent  Last BM 7/28/2024      * Septic arthritis of knee  (HCC)  Assessment & Plan  Prior traumatic HW failure requiring HW removal and then developed sinus tract extending into jt space with op finding c/w septic arthritis per last ID note  - S/p complicated right hand crafted antibiotic cement total knee arthroplasty with augmentation by ortho, Dr Hendrix on 7/15  Weight-bearing as tolerated on affected limb; ROM as tolerated, pillow/blankets under achilles not behind knee while in bed  Comgmt by IM during ARC course   Daptomycin IV through 8/26 for 6 week course - Monitor labs (CBC/BMP and CK weekly) while on Abx; remove PICC after IV abx   7/29 - Leukocytosis 14.2K stable BMP and CK.  Increase in leukocytosis may be related to acute gastroenteritis - will recheck on Wed 7/31/24  Monitor incision/area for infection or dehiscence/impaired healing - decreased serosang drainage, no clear signs of infection again  generalized knee edema stable - pt feels it is improving   Recommend wound care consult - pending   In interim, Change dressing with DSD BID and PRN if soiled to avoid maceration - hopefully can decrease when improves.  Notify MD of increased drainage, pus, redness, or breakdown of incision   POD#14 is Mon 7/29.  Ortho appt with Dr. Hendrix 7/30/24 and transport to be arranged  Monitor for signs/symptoms of VTE, atelectasis, acute blood loss anemia, or other infection   Function improving   Continue acute comprehensive interdisciplinary inpatient rehabilitation to include intensive skilled therapies (PT, OT) as outlined with oversight and management by rehabilitation physician as well as inpatient rehab level nursing, case management and weekly interdisciplinary team meetings.   Follow-up with Ortho Sx after d/c and ortho if needed during ARC course   7/26/24:    7/24/24    7/15/24        Gastroenteritis  Assessment & Plan  N/V & Diarrhea on 7/27 and 7/28  Resolved now  Labs stable.    Leukocytosis  Assessment & Plan  7/29 - Leukocytosis 14.2K stable BMP and CK.   Increase in leukocytosis may be related to acute gastroenteritis - will recheck on Wed 7/31/24  Discussed with IM   No increased change in incision appearance or palpation    Anemia  Assessment & Plan  Hb 10.1>9.7  Consult(ed) IM and comanagement with their service during ARC course   Monitor H/H, vitals, signs/symptoms of acute bleeding      Diabetic neuropathy (HCC)  Assessment & Plan  Optimal DM mgmt  Monitor skin for increased infection/breakdown/wounds which patient is at higher risk for  Skilled PT/OT   Fall precautions      At risk for venous thromboembolism (VTE)  Assessment & Plan  SCDs, ambulation, and aspirin 81mg BID per ortho       Pain  Assessment & Plan  Located in knee - overall well Controlled   Ice PRN   APAP 975mg TID PRN - using this primary  Hydromorphone 2mg Q4H PRN for severe pain.   Monitor for oversedation, AMS/delirium, and respiratory depression   If being administered - hold opiates, muscle relaxants, benzodiazepines, and gabapentin for oversedation, AMS, or RR<12.  Counseled on and continue to encourage deep breathing/relaxation/behavioral pain management techniques      Poor dentition  Assessment & Plan  ID recommends OMFS eval for possible extractions - this was not obtained on acute PTA per prior providers - will recommend OP follow-up unless concerns in interim - discussed with pt   - Mgmt per IM here     Depression and anxiety  Assessment & Plan  Lexapro   Supportive counseling  Counseled on and continue to encourage deep breathing/relaxation/behavioral management techniques      Type 2 diabetes mellitus with diabetic peripheral angiopathy without gangrene (HCC)  Assessment & Plan  Lab Results   Component Value Date    HGBA1C 6.2 (H) 07/01/2024     - Current management by internal medicine  - Monitor for signs and symptoms of hypoglycemia   - Current meds: Lantus, Lispro per IM   - Consistent carb diabetic diet  - Recommend close monitoring and optimal management during  "recovery/rehab phase as well       Orthostasis  Assessment & Plan  Acute orthostasis upon arrival to [facility]  Abdominal binder and compression stockings as needed during therapies  Adequate hydration  Internal medicine did discontinue Losartan 7/25/24 7/27 & 7/28 developed acute N/V and diarrhea possible acute gastroenteritis with continued orthostasis.  Received IVF x 1  Giving another 1liter NS IVF today      PAD (peripheral artery disease) (HCC)  Assessment & Plan  Hx of peripheral arterial disease, with previous right popliteal artery stent, and subsequent TMA   - Overall management per hospitalist during acute course  - Antithrombotic: currently on aspiring 81mg BID (also for VTE proph) per prior providers   - Statin  - Optimal blood pressure and blood sugar control  (Was recommended asp and plavix when last seen by [facility] 4/2023-unclear why not on this)  - OP [facility] sx follow-up     Hyperlipidemia  Assessment & Plan  Statin     Gastroesophageal reflux disease without esophagitis  Assessment & Plan  Protonix 40 mg every other day    Hypertension, essential  Assessment & Plan  At home: Losartan 25 mg daily  Here: None - due to orthostasis discontinued on 7/25/24 by internal medicine.  Will need to watch carefully.  Internal medicine consulted and co-management with their service  Monitor vitals with and without activity; monitor for orthostasis  Monitor hemoglobin, electrolytes, kidney function, hydration status       Labs, medications, and imaging personally reviewed 07/29/24.      ROS:  A ten point review of systems was completed on 07/29/24  and pertinent positives are listed in subjective section. All other systems reviewed were negative.     OBJECTIVE:   /60 (BP Location: Left arm)   Pulse 79   Temp (!) 97.2 °F (36.2 °C) (Temporal)   Resp 16   Ht 6' 4\" (1.93 m)   Wt 110 kg (241 lb 6.5 oz)   SpO2 97%   BMI 29.38 kg/m²     Physical Exam  Vitals and nursing note reviewed.   Constitutional:       General: " He is not in acute distress.     Comments: fatigued   HENT:      Head: Normocephalic and atraumatic.      Nose: Nose normal.      Mouth/Throat:      Mouth: Mucous membranes are moist.   Eyes:      Conjunctiva/sclera: Conjunctivae normal.   Cardiovascular:      Rate and Rhythm: Normal rate and regular rhythm.      Pulses: Normal pulses.   Pulmonary:      Effort: Pulmonary effort is normal.      Breath sounds: Normal breath sounds. No wheezing or rales.   Abdominal:      General: Bowel sounds are normal. There is no distension.      Palpations: Abdomen is soft.      Tenderness: There is no abdominal tenderness.   Musculoskeletal:         General: No swelling.      Cervical back: Neck supple.   Skin:     General: Skin is warm.      Comments: Personally visualized incision with sutures  +Periincisional edema present - Unchanged from admission   Neurological:      Mental Status: He is alert and oriented to person, place, and time.      Motor: Weakness present.      Gait: Gait abnormal.   Psychiatric:         Mood and Affect: Mood normal.            Lab Results   Component Value Date    WBC 14.18 (H) 07/29/2024    HGB 9.7 (L) 07/29/2024    HCT 30.7 (L) 07/29/2024    MCV 94 07/29/2024     (H) 07/29/2024     Lab Results   Component Value Date    SODIUM 138 07/29/2024    K 4.1 07/29/2024     07/29/2024    CO2 27 07/29/2024    BUN 19 07/29/2024    CREATININE 0.65 07/29/2024    GLUC 157 (H) 07/29/2024    CALCIUM 8.4 07/29/2024     Lab Results   Component Value Date    INR 1.08 07/01/2024    INR 1.09 08/15/2023    INR 1.04 08/04/2020    PROTIME 13.9 07/01/2024    PROTIME 13.9 08/15/2023    PROTIME 13.8 08/04/2020           Current Facility-Administered Medications:     acetaminophen (TYLENOL) tablet 975 mg, 975 mg, Oral, Q8H PRN, Laxmi Marks MD    ascorbic acid (VITAMIN C) tablet 500 mg, 500 mg, Oral, BID, Jessie Bailey PA-C, 500 mg at 07/29/24 0818    aspirin (ECOTRIN LOW STRENGTH) EC tablet 81  mg, 81 mg, Oral, BID, Jessie Bailey PA-C, 81 mg at 07/29/24 0818    atorvastatin (LIPITOR) tablet 40 mg, 40 mg, Oral, Daily With Dinner, Jessie Bailye PA-C, 40 mg at 07/28/24 1710    DAPTOmycin (CUBICIN) 575 mg in sodium chloride 0.9 % 50 mL IVPB, 6 mg/kg (Adjusted), Intravenous, Q24H, Laxmi Marks MD, Last Rate: 100 mL/hr at 07/28/24 1527, 575 mg at 07/28/24 1527    docusate sodium (COLACE) capsule 100 mg, 100 mg, Oral, TID PRN, Laxmi Marks MD    escitalopram (LEXAPRO) tablet 5 mg, 5 mg, Oral, Daily, Jessie Bailey PA-C, 5 mg at 07/29/24 0818    HYDROmorphone (DILAUDID) tablet 2 mg, 2 mg, Oral, Q4H PRN, Laxmi Marks MD    insulin glargine (LANTUS) subcutaneous injection 25 Units 0.25 mL, 25 Units, Subcutaneous, Q12H TESS, Jessie Bailey PA-C, 25 Units at 07/29/24 0818    insulin lispro (HumALOG/ADMELOG) 100 units/mL subcutaneous injection 1-6 Units, 1-6 Units, Subcutaneous, TID AC, 1 Units at 07/29/24 0818 **AND** Fingerstick Glucose (POCT), , , TID AC, Jessie Bailey PA-C    levothyroxine tablet 175 mcg, 175 mcg, Oral, Early Morning, Jessie Bailey PA-C, 175 mcg at 07/29/24 0546    ondansetron (ZOFRAN-ODT) dispersible tablet 4 mg, 4 mg, Oral, Q6H PRN, NAILA Ruiz, 4 mg at 07/28/24 0808    pantoprazole (PROTONIX) EC tablet 40 mg, 40 mg, Oral, Every Other Day, Jessie Bailey PA-C, 40 mg at 07/29/24 0818    senna (SENOKOT) tablet 8.6 mg, 1 tablet, Oral, BID PRN, Meir Prather MD    sodium chloride 0.9 % infusion, 100 mL/hr, Intravenous, Once, Laxmi Marks MD    zolpidem (AMBIEN) tablet 10 mg, 10 mg, Oral, HS PRN, Jessie Bailey PA-C    Past Medical History:   Diagnosis Date    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    Chronic kidney disease     Colon polyp     Constipation 03/09/2023    Diabetes mellitus (HCC)     Disease of thyroid gland     GERD (gastroesophageal reflux disease)      HHS vs DKA 11/16/2018    Hyperlipidemia     Hypertension     Thyroid cancer ([facility])        Patient Active Problem List    Diagnosis Date Noted    Septic arthritis of knee ([facility]) 02/06/2024    Gastroenteritis 07/29/2024    Poor dentition 07/22/2024    Pain 07/22/2024    At risk for venous thromboembolism (VTE) 07/22/2024    Diabetic neuropathy ([facility]) 07/22/2024    Anemia 07/22/2024    Leukocytosis 07/22/2024    Chronic pain of right knee 07/10/2024    Type 2 diabetes mellitus without complication, with long-term current use of insulin ([facility]) 07/10/2024    S/P hardware removal 06/20/2023    MANNY (obstructive sleep apnea) 05/01/2023    Constipation 03/09/2023    Depression and anxiety 03/03/2023    Acquired genu varum of right lower extremity 02/21/2023    MARKELL (acute kidney injury) ([facility]) 02/14/2023    Chronic kidney disease-mineral and bone disorder 02/14/2023    Closed fracture of right tibial plateau 02/07/2023    Stage 3b chronic kidney disease ([facility]) 11/15/2022    Hyperkalemia 11/15/2022    Alcoholism ([facility]) 11/15/2022    Dupuytren's contracture of right hand 06/07/2022    Type 2 diabetes mellitus with diabetic peripheral angiopathy without gangrene ([facility]) 02/07/2022    Acquired hypothyroidism 10/18/2021    Acquired absence of right foot ([facility]) 03/29/2021    Persistent proteinuria 01/12/2021    Abnormal EKG 12/03/2020    Insomnia 12/03/2020    Orthostasis 10/08/2020    Urinary retention 09/08/2020    Thyroid cancer ([facility]) 08/20/2020    PAD (peripheral artery disease) ([facility]) 08/05/2020    Elevated liver enzymes 10/24/2019    Colon polyps 09/12/2019    Right-sided tinnitus 11/26/2018    Hypertension, essential 09/24/2018    Gastroesophageal reflux disease without esophagitis 09/24/2018    Hyperlipidemia 12/17/2009    History of nonadherence to medical treatment 02/20/2008          Laxmi Marks MD    I have spent a total time of 45 minutes on 07/29/24 in caring for this patient including Impressions, Documenting in the  medical record, Reviewing / ordering tests, medicine, procedures  , and Communicating with other healthcare professionals .      ** Please Note:  voice to text software may have been used in the creation of this document. Although proof errors in transcription or interpretation are a potential of such software**

## 2024-07-29 NOTE — PROGRESS NOTES
"Progress Note - Aaron Richards 59 y.o. male MRN: [id number]    Unit/Bed#: [facility] 215-01 Encounter: [id number]        Subjective:   Patient seen and examined at bedside after reviewing the chart and discussing the case with the caring staff.      Patient examined at bedside.  Patient reportedly was having nausea and vomitings which started on Saturday followed by diarrhea on Sunday.  The patient feels much improved.    On review of patient's labs from 7/29/2024.  Patient's CMP showed glucose 157, alk phos 136.  Patient's CBC showed WBC 14.18, hemoglobin 9.7 with hematocrit 30.7 and platelet count 495.    Physical Exam   Vitals: Blood pressure 106/60, pulse 79, temperature (!) 97.2 °F (36.2 °C), temperature source Temporal, resp. rate 16, height 6' 4\" (1.93 m), weight 110 kg (241 lb 6.5 oz), SpO2 97%.,Body mass index is 29.38 kg/m².  Constitutional: Patient in no acute distress.  HEENT: PERR, EOMI, MMM.  Cardiovascular: Normal rate and regular rhythm.    Pulmonary/Chest: Effort normal and breath sounds normal.   Abdomen: Soft, + BS, NT.    Assessment/Plan:  Aaron Richards is a(n) 59 y.o. 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  Continue ASA 81 mg twice daily (for 4 weeks for ortho DVT ppx, then back to daily) and atorvastatin 40 mg daily.  Home: irbesartan/hydrochlorothiazide 150-12.5 mg daily.  Discontinued all BP medication due to orthostasis.  Abdominal binder.  Trial saltines prior to shower.   DM2.  Hgb A1c 6.2%.  Continue Lantus 25 units twice daily.  SSI w/ POCT AS HS.  Home: Lantus 30 units BID and Apidra SSI.   Acquired hypothyroidism. Hx thyroid cancer s/p thyroidectomy.   Continue levothyroxine 175 mcg daily.   GERD.  Continue Protonix 40 mg daily.   CKD3b.  Stable.  Cr 0.65 on 7/22.  Avoid nephrotoxins.   MDD/anxiety.  Continue Lexapro 20 mg daily.   Hx of alcohol use.  Stable.  Sober since 2023.   Insomnia.  Continue Ambien 10 mg " HS as needed.   Pain and bowel regimen.  As per physiatry.   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  Continue daptomycin 6 mg/kg IV every 24 hours through 8/26.  Check CPK weekly.  Patient receiving intensive PT OT as per physiatry.     Anticipated date of discharge.  Thursday 8/1/24.

## 2024-07-29 NOTE — NUTRITION
24 1534   Biochemical Data,Medical Tests, and Procedures   Biochemical Data/Medical Tests/Procedures Lab values reviewed;Meds reviewed   Labs (Comment)  B, ALP:136, pro:6.1, alb:2.9, WBC:14.18, H&H:9.7/30.7   Meds (Comment) Vit C, ASA, lipitor, colace, lexapro, dilaudid, lantus, humalog, levothyroxine, zofran, protonix, senna, ambien   Nutrition-Focused Physical Exam   Nutrition-Focused Physical Exam Findings RN skin assessment reviewed  (Wound right knee per documentation)   Nutrition-Focused Physical Exam Findings N/V/D over the weekend; now resolved   Medical-Related Concerns PMH reviewed   Adequacy of Intake   Nutrition Modality PO   Feeding Route   PO Independent   Current PO Intake   Current Diet Order CCD 3 diet thin liquids   Current Meal Intake %   Estimated Calorie Intake %   Estimated Protein Intake  %   Estimated Fluid Intake %  (Received 1L NaCl over the weekend)   Estimated calorie intake compared to estimated need Nutrient needs met   PES Statement   Problem No nutrition diagnosis   Recommendations/Interventions   Malnutrition/BMI Present No  (does not meet criteria)   Summary CCD 3 diet thin liquids. No nutrition supplements. Meal completions 100%.  241#, BMI=29.38; 4# weight loss from admission  245#. Medications reviewed. Recieved IVF due to recent N/V/D. Missing teeth. Nonpitting edema to RLE. Skin integrity reviewed. LBM . He states he is feeling better. He reports he believes he got food poisoning from a cheesesteak he ordered out on Friday. Denies any problems with his meals from facility.   Interventions/Recommendations Continue current diet order   Education Assessment   Education Education not indicated at this time   Nutrition Complexity Risk   Nutrition complexity level Low risk   Follow up date 24

## 2024-07-30 ENCOUNTER — TELEMEDICINE (OUTPATIENT)
Dept: INFECTIOUS DISEASES | Facility: CLINIC | Age: 59
End: 2024-07-30
Payer: MEDICARE

## 2024-07-30 DIAGNOSIS — E11.9 TYPE 2 DIABETES MELLITUS WITHOUT COMPLICATION, WITH LONG-TERM CURRENT USE OF INSULIN (HCC): ICD-10-CM

## 2024-07-30 DIAGNOSIS — N18.32 STAGE 3B CHRONIC KIDNEY DISEASE (HCC): ICD-10-CM

## 2024-07-30 DIAGNOSIS — Z79.4 TYPE 2 DIABETES MELLITUS WITHOUT COMPLICATION, WITH LONG-TERM CURRENT USE OF INSULIN (HCC): ICD-10-CM

## 2024-07-30 DIAGNOSIS — M00.9 PYOGENIC ARTHRITIS OF RIGHT KNEE JOINT, DUE TO UNSPECIFIED ORGANISM (HCC): Primary | ICD-10-CM

## 2024-07-30 DIAGNOSIS — K08.9 POOR DENTITION: ICD-10-CM

## 2024-07-30 LAB
GLUCOSE SERPL-MCNC: 112 MG/DL (ref 65–140)
GLUCOSE SERPL-MCNC: 166 MG/DL (ref 65–140)
GLUCOSE SERPL-MCNC: 169 MG/DL (ref 65–140)
GLUCOSE SERPL-MCNC: 219 MG/DL (ref 65–140)

## 2024-07-30 PROCEDURE — 99233 SBSQ HOSP IP/OBS HIGH 50: CPT | Performed by: PHYSICAL MEDICINE & REHABILITATION

## 2024-07-30 PROCEDURE — 97116 GAIT TRAINING THERAPY: CPT | Performed by: PHYSICAL THERAPIST

## 2024-07-30 PROCEDURE — 97110 THERAPEUTIC EXERCISES: CPT

## 2024-07-30 PROCEDURE — 97530 THERAPEUTIC ACTIVITIES: CPT

## 2024-07-30 PROCEDURE — G0316 PR PROLONG INPT EVAL ADD15 M: HCPCS | Performed by: PHYSICAL MEDICINE & REHABILITATION

## 2024-07-30 PROCEDURE — 82948 REAGENT STRIP/BLOOD GLUCOSE: CPT

## 2024-07-30 PROCEDURE — 97110 THERAPEUTIC EXERCISES: CPT | Performed by: PHYSICAL THERAPIST

## 2024-07-30 PROCEDURE — 99214 OFFICE O/P EST MOD 30 MIN: CPT | Performed by: PHYSICIAN ASSISTANT

## 2024-07-30 PROCEDURE — 97530 THERAPEUTIC ACTIVITIES: CPT | Performed by: PHYSICAL THERAPIST

## 2024-07-30 RX ADMIN — OXYCODONE HYDROCHLORIDE AND ACETAMINOPHEN 500 MG: 500 TABLET ORAL at 08:21

## 2024-07-30 RX ADMIN — ASPIRIN 81 MG: 81 TABLET, COATED ORAL at 08:21

## 2024-07-30 RX ADMIN — INSULIN LISPRO 1 UNITS: 100 INJECTION, SOLUTION INTRAVENOUS; SUBCUTANEOUS at 17:06

## 2024-07-30 RX ADMIN — INSULIN GLARGINE 25 UNITS: 100 INJECTION, SOLUTION SUBCUTANEOUS at 08:21

## 2024-07-30 RX ADMIN — ESCITALOPRAM OXALATE 5 MG: 5 TABLET, FILM COATED ORAL at 08:21

## 2024-07-30 RX ADMIN — LEVOTHYROXINE SODIUM 175 MCG: 100 TABLET ORAL at 05:20

## 2024-07-30 RX ADMIN — INSULIN LISPRO 1 UNITS: 100 INJECTION, SOLUTION INTRAVENOUS; SUBCUTANEOUS at 12:42

## 2024-07-30 RX ADMIN — ACETAMINOPHEN 975 MG: 325 TABLET ORAL at 17:11

## 2024-07-30 RX ADMIN — INSULIN GLARGINE 25 UNITS: 100 INJECTION, SOLUTION SUBCUTANEOUS at 20:36

## 2024-07-30 RX ADMIN — OXYCODONE HYDROCHLORIDE AND ACETAMINOPHEN 500 MG: 500 TABLET ORAL at 17:06

## 2024-07-30 RX ADMIN — DAPTOMYCIN 575 MG: 500 INJECTION, POWDER, LYOPHILIZED, FOR SOLUTION INTRAVENOUS at 14:55

## 2024-07-30 RX ADMIN — ATORVASTATIN CALCIUM 40 MG: 40 TABLET, FILM COATED ORAL at 17:06

## 2024-07-30 RX ADMIN — ASPIRIN 81 MG: 81 TABLET, COATED ORAL at 17:06

## 2024-07-30 NOTE — TEAM CONFERENCE
Acute RehabilitationTeam Conference Note  Date: 7/30/2024   Time: 10:58 AM       Patient Name:  Aaron Richards       Medical Record Number: [id number]   YOB: 1965  Sex: Male          Room/Bed:  [facility] 215/[facility] 215-01  Payor Info:  Payor: MEDICARE / Plan: MEDICARE A AND B / Product Type: Medicare A & B Fee for Service /      Admitting Diagnosis: Osteoarthritis [M19.90]   Admit Date/Time:  7/21/2024  2:15 PM  Admission Comments: No comment available     Primary Diagnosis:  Septic arthritis of knee ([facility])  Principal Problem: Septic arthritis of knee ([facility])    Patient Active Problem List    Diagnosis Date Noted    Gastroenteritis 07/29/2024    Poor dentition 07/22/2024    Pain 07/22/2024    At risk for venous thromboembolism (VTE) 07/22/2024    Diabetic neuropathy ([facility]) 07/22/2024    Anemia 07/22/2024    Leukocytosis 07/22/2024    Chronic pain of right knee 07/10/2024    Type 2 diabetes mellitus without complication, with long-term current use of insulin ([facility]) 07/10/2024    Septic arthritis of knee ([facility]) 02/06/2024    S/P hardware removal 06/20/2023    MANNY (obstructive sleep apnea) 05/01/2023    Constipation 03/09/2023    Depression and anxiety 03/03/2023    Acquired genu varum of right lower extremity 02/21/2023    MARKELL (acute kidney injury) ([facility]) 02/14/2023    Chronic kidney disease-mineral and bone disorder 02/14/2023    Closed fracture of right tibial plateau 02/07/2023    Stage 3b chronic kidney disease ([facility]) 11/15/2022    Hyperkalemia 11/15/2022    Alcoholism ([facility]) 11/15/2022    Dupuytren's contracture of right hand 06/07/2022    Type 2 diabetes mellitus with diabetic peripheral angiopathy without gangrene ([facility]) 02/07/2022    Acquired hypothyroidism 10/18/2021    Acquired absence of right foot ([facility]) 03/29/2021    Persistent proteinuria 01/12/2021    Abnormal EKG 12/03/2020    Insomnia 12/03/2020    Orthostasis 10/08/2020    Urinary retention 09/08/2020    Thyroid cancer ([facility]) 08/20/2020    PAD  (peripheral artery disease) (HCC) 08/05/2020    Elevated liver enzymes 10/24/2019    Colon polyps 09/12/2019    Right-sided tinnitus 11/26/2018    Hypertension, essential 09/24/2018    Gastroesophageal reflux disease without esophagitis 09/24/2018    Hyperlipidemia 12/17/2009    History of nonadherence to medical treatment 02/20/2008       Physical Therapy:    Weight Bearing Status: Weight Bearing as Tolerated  Transfers: Independent  Bed Mobility: Independent  Amulation Distance (ft): 130 feet  Ambulation: Incidental Touching, Minimal Assistance  Assistive Device for Ambulation: Roller Walker  Wheelchair Mobility Distance: 300 ft  Wheelchair Mobility: Independent  Number of Stairs: 6  Assistive Device for Stairs: Right Hand Rail  Stair Assistance: Minimal Assistance  Ramp:  (TBA)  Assistive Device for Ramp:  (TBA)  Discharge Recommendations: Home with:  DC Home with:: Family Support, Home Physical Therapy    07/24/2024:  Patient recent admission to unit.    Patient participating in therapy and making positive gains.   Patient MOD I Bed mobility, CG STS with walker, CG/MIN A SPT with walker, MIN A ambulation up to 50' with walker and wheelchair follow, MOD I wheelchair mobility level and unlevel surfaces.  Ramp and steps not assessed.  Patient limited by decreased ROM/strength, decreased balance and safety, decreased endurance, pain, and orthostatic hypotension.  Patient would benefit from continued inpatient ARC PT to increase function, safety, and independence in prep for safe d/c to home with family and continued PT services as needed.    07/31/2024:  Patient following ARU PT, progressing well.  Limited by orthostatic BPs.    Patient participating in therapy and making positive gains.   Patient MOD I Bed mobility, MI STS with walker, MI SPT without walker, MIN A ambulation up to 130' with walker and wheelchair follow due to orthostatic BPs, MOD I wheelchair mobility level and unlevel surfaces.  Min A negotiation  of 6 stairs with single HR.  Patient limited by decreased ROM/strength, decreased balance and safety, decreased endurance, pain, and orthostatic hypotension.  Patient would benefit from continued inpatient ARC PT to increase function, safety, and independence in prep for safe d/c to home with family and continued PT services as needed.    Occupational Therapy:  Eating: Independent  Grooming: Independent  Bathing: Supervision  Bathing: Supervision  Upper Body Dressing: Independent  Lower Body Dressing: Supervision  Toileting: Supervision  Tub/Shower Transfer: Supervision  Toilet Transfer: Supervision  Cognition: Within Defined Limits  Orientation: Person, Place, Time, Situation  Discharge Recommendations: Home with:  DC Home with:: Family Support, First Floor Setup, Home Occupational Therapy       7/22/24: Pt evaluated at ARU 7/22/24; LOF listed above. Pt presents motivated and eager to improve during ARU stay. Barriers to d/c include generalized weakness, impaired balance, pain, decreased endurance, and decreased activity tolerance; all affecting self care and fxl transfers. Pt would benefit from continued skilled OT services to improve independence in all daily tasks.     7/31/24: Pt's current LOF listed above. Barriers to d/c include decreased strength throughout but especially RLE s/p TKA, decreased balance, pain in R knee, mildly impaired safety awareness, and decreased activity tolerance; all affect independence in self care and fxl transfers. Pt participating in ADL training, therapeutic exercises and activities, fxl mobility/transfers, and activity tolerance in order to progress towards goals. Pt would benefit from continued skilled OT services in order to address listed barriers and prepare for safe d/c.    Speech Therapy:           No notes on file    Nursing Notes:  Appetite: Good  Diet Type: Diabetic                      Diet Patient/Family Education Complete: Yes    Type of Wound (LDA): Wound                     Type of Wound Patient/Family Education: Yes  Bladder: Continent     Bladder Patient/Family Education: Yes  Bowel: 4 - Minimal Assistance     Bowel Patient/Family Education: Yes  Pain Location/Orientation: Orientation: Right, Location: Leg  Pain Score: 0                       Hospital Pain Intervention(s): Declines  Pain Patient/Family Education: Yes  Medication Management/Safety  Injectable: Insulin  Safe Administration: Yes  Medication Patient/Family Education Complete: Yes    7/22/24 Patient is a recent admission to [facility] following knee surgery for a septic arthritis of the right knee.  Patient remains alert and oriented.  Lungs are clear on room air.  Bilateral lower extremity trace edema noted.  Right knee incision with sutures in place twice a day dressing changes to be completed by nursing.  Patient is on IV antibiotics through a right upper extremity PICC line.  Blood sugars are being monitored four times a day as ordered.  Patient states he is comfortable self injecting his long acting insulin as he was taking this prior to admission.  Orthostatic blood pressure readings are being checked as ordered for three days.  Patient was able to transfer with one assist.  Pain controlled with scheduled Tylenol and as needed Dilaudid.     7/29/24 Patient remains alert and oriented.  Lungs are clear on room air.  Patient is continent of bowel and bladder.  IV antibiotics infused as ordered through the right upper extremity PICC line without difficulty.  Right knee incision is sutured with twice a day dressing changes completed by nursing.  Blood sugars are being checked four times a day as ordered.  Patient with continued episodes of dizziness and orthostatis blood pressure drops during the day.  Patient encouraged to wear an abdominal binder during therapy sessions.  Patient with noted nausea, vomiting and diarrhea over the weekend resolving now.  Patient received IV fluids for hydration during episode.  Patient  receiving tylenol as needed for pain management.  Patient is able to ambulate with one assist and a walker to the bathroom.     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 Patient lives with spouse in ranch home with 4STE. Family transports. Patient has PICC.IV abx course until 8/26  For discharge on 8/1, [facility] referral made, IV abx and supplies $520 for duration until 8/26 of abx. [facility] home care arranged for RN/PT/OT. Virtual ID appt today at 1:00,  ortho appt TBD.    Is the patient actively participating in therapies? yes  List any modifications to the treatment plan: na    Barriers Interventions   Complex infection of right knee, right knee revision, orthostatic BP, elevated WBC, DM, pain Medical management and oversight, reqs IV antibx 1/day until Aug 26, medication management, abd binder, repeat blood work, ID consult 7-30   Decreased strength, end Therapeutic exercise, therapeutic activity   Decreased balance ADL, transfer, gait training             Is the patient making expected progress toward goals? yes  List any update or changes to goals: na    Medical Goals: Patient will be able to manage medical conditions and comorbid conditions with medications and follow up upon completion of rehab program    Weekly Team Goals:   Rehab Team Goals  ADL Team Goal: Patient will return to premorbid level for ADLs upon completion of rehab program  Bowel/Bladder Team Goal: Patient will be independent with bladder/bowel management with least restrictive device upon completion of rehab program  Transfer Team Goal: Patient will be  independent with transfers with least restrictive device upon completion of rehab program  Locomotion Team Goal: Patient will be independent with locomotion with least restrictive device upon completion of rehab program    Mod I bed mobility, transfers, and mobility  Mod I self care    Health and wellness: to be able to return home and care for dogs    Discussion: Plan for return home with wife with [facility] for PT, OT, and nursing with progression to outpatient    Anticipated Discharge Date:  Aug 1, 2024  [facility] Team Members Present:  The following team members are supervising care for this patient and were present during this Weekly Team Conference.    MD Angela Mcmanus, RN  Brenda Cummings, BONI and BONI Bunn, PT  Anna King, OTR/L and Octavia Alarcon OTR/L

## 2024-07-30 NOTE — PROGRESS NOTES
07/30/24 0639   Pain Assessment   Pain Assessment Tool 0-10   Pain Score No Pain   General   Change In Medical/Functional Status Vitals monitored throughout, no abdominal binder as patient declines use.  /74 after ambulation.  105/70 after negotiation of stairs x 2 trials   Cognition   Overall Cognitive Status WFL   Subjective   Subjective I don't feel dizzy, I just feel fatigued, but I also didn't eat yet.   Roll Left and Right   Type of Assistance Needed Independent   Physical Assistance Level No physical assistance   Roll Left and Right CARE Score 6   Sit to Lying   Type of Assistance Needed Independent   Physical Assistance Level No physical assistance   Sit to Lying CARE Score 6   Lying to Sitting on Side of Bed   Type of Assistance Needed Independent   Physical Assistance Level No physical assistance   Lying to Sitting on Side of Bed CARE Score 6   Sit to Stand   Type of Assistance Needed Independent   Physical Assistance Level No physical assistance   Comment MI from bed   Sit to Stand CARE Score 6   Bed-Chair Transfer   Type of Assistance Needed Independent   Physical Assistance Level No physical assistance   Comment MI from bed to/from wheelchair and wheelchair to/from recliner   Chair/Bed-to-Chair Transfer CARE Score 6   Car Transfer   Reason if not Attempted Environmental limitations   Car Transfer CARE Score 10   Walk 10 Feet   Type of Assistance Needed Incidental touching   Physical Assistance Level No physical assistance   Comment Cl S/CGA, RW   Walk 10 Feet CARE Score 4   Walk 50 Feet with Two Turns   Type of Assistance Needed Incidental touching   Physical Assistance Level No physical assistance   Comment Cl S/CGA, RW   Walk 50 Feet with Two Turns CARE Score 4   Walk 150 Feet   Comment Fatigued   Reason if not Attempted Safety concerns   Walk 150 Feet CARE Score 88   Walking 10 Feet on Uneven Surfaces   Type of Assistance Needed Incidental touching   Physical Assistance Level No physical  assistance   Comment CGA, RW, outdoors on concrete   Walking 10 Feet on Uneven Surfaces CARE Score 4   Ambulation   Primary Mode of Locomotion Prior to Admission Walk   Distance Walked (feet) 80 ft  (80 ft x 1, 120 ft x 1, 65 ft x 1)   Assist Device Roller Walker   Does the patient walk? 2. Yes   Wheel 50 Feet with Two Turns   Type of Assistance Needed Independent   Physical Assistance Level No physical assistance   Wheel 50 Feet with Two Turns CARE Score 6   Wheel 150 Feet   Type of Assistance Needed Independent   Physical Assistance Level No physical assistance   Comment 300+ feet inside and outside, inclines, uneven surfaces, carpet, tile   Wheel 150 Feet CARE Score 6   Wheelchair mobility   Does the patient use a wheelchair? 1. Yes   Type of Wheelchair Used 1. Manual   Method Right upper extremity;Left upper extremity;Left lower extremity   Curb or Single Stair   Style negotiated Single stair   Type of Assistance Needed Physical assistance   Physical Assistance Level 25% or less   Comment min A with SBA of 2nd for safety due to fatigue   1 Step (Curb) CARE Score 3   4 Steps   Type of Assistance Needed Physical assistance   Physical Assistance Level 25% or less   Comment min A with SBA of 2nd, single HR.  6 steps   4 Steps CARE Score 3   12 Steps   Reason if not Attempted Activity not applicable   12 Steps CARE Score 9   Stairs   Findings Step to pattern, cues for right knee extn for ascending, pt requires visual input to ensure placement of feet due to TMA and neuropathy   Toilet Transfer   Type of Assistance Needed Supervision   Physical Assistance Level No physical assistance   Comment Wheelchair, grab bars   Toilet Transfer CARE Score 4   Therapeutic Interventions   Strengthening Supine LE TE   Assessment   Treatment Assessment Pt presents supine in bed, NAD.  Pt reports stiffness/soreness this a.m.  Pt continues to require frequent rest breaks, BP dropping during session without sensation of dizziness per  patient.  Pt becomes fatigued during session, but recovers after short standing rest break.  Overall making steady progress with strength, ROM, and functional activity tolerance compared to baseline.  Cont per POC and progress as tolerated.   Problem List Decreased strength;Decreased range of motion;Decreased endurance;Impaired balance;Decreased safety awareness;Pain   PT Barriers   Physical Impairment Decreased strength;Decreased range of motion;Decreased endurance;Impaired balance;Decreased safety awareness;Pain;Decreased mobility   Functional Limitation Walking;Transfers;Standing;Stair negotiation;Ramp negotiation;Car transfers   Plan   Treatment/Interventions Functional transfer training;LE strengthening/ROM;Elevations;Therapeutic exercise;Endurance training;Patient/family training;Equipment eval/education;Bed mobility;Gait training   Progress Progressing toward goals   PT Therapy Minutes   PT Time In 0639   PT Time Out 0811   PT Total Time (minutes) 92   PT Mode of treatment - Individual (minutes) 92   PT Mode of treatment - Concurrent (minutes) 0   PT Mode of treatment - Group (minutes) 0   PT Mode of treatment - Co-treat (minutes) 0   PT Mode of Treatment - Total time(minutes) 92 minutes   PT Cumulative Minutes 804     Supine LE TE:  4x10, B/L LEs  SLR - flexion  SLR - ABd - SL  Bridges  QS  SAQ  Hip ABd - hooklying with blue t-band  Hip ADd - yvonne  Heel slides  Clamshells    Patient remains OOB in chair, all needs in reach.  Alarm in place and activated.  Encouraged use of call bell, patient verbalizes understanding.

## 2024-07-30 NOTE — PROGRESS NOTES
"Progress Note - Aaron Richards 59 y.o. male MRN: [id number]    Unit/Bed#: -01 Encounter: [id number]        Subjective:   Patient seen and examined at bedside after reviewing the chart and discussing the case with the caring staff.      Patient examined at bedside.  Patient reports no nausea vomiting and diarrhea.  Patient has virtual ID consult today at 1 PM.    On review of patient's labs from 7/29/2024.  Patient's CMP showed glucose 157, alk phos 136.  Patient's CBC showed WBC 14.18, hemoglobin 9.7 with hematocrit 30.7 and platelet count 495.    Physical Exam   Vitals: Blood pressure 138/82, pulse 76, temperature 98.7 °F (37.1 °C), temperature source Temporal, resp. rate 17, height 6' 4\" (1.93 m), weight 110 kg (241 lb 6.5 oz), SpO2 96%.,Body mass index is 29.38 kg/m².  Constitutional: Patient in no acute distress.  HEENT: PERR, EOMI, MMM.  Cardiovascular: Normal rate and regular rhythm.    Pulmonary/Chest: Effort normal and breath sounds normal.   Abdomen: Soft, + BS, NT.    Assessment/Plan:  Aaron Richards is a(n) 59 y.o. 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  Continue ASA 81 mg twice daily (for 4 weeks for ortho DVT ppx, then back to daily) and atorvastatin 40 mg daily.  Home: irbesartan/hydrochlorothiazide 150-12.5 mg daily.  Discontinued all BP medication due to orthostasis.  Abdominal binder.  Trial saltines prior to shower.   DM2.  Hgb A1c 6.2%.  Continue Lantus 25 units twice daily.  SSI w/ POCT AS HS.  Home: Lantus 30 units BID and Apidra SSI.   Acquired hypothyroidism. Hx thyroid cancer s/p thyroidectomy.   Continue levothyroxine 175 mcg daily.   GERD.  Continue Protonix 40 mg daily.   CKD3b.  Stable.  Cr 0.65 on 7/22.  Avoid nephrotoxins.   MDD/anxiety.  Continue Lexapro 20 mg daily.   Hx of alcohol use.  Stable.  Sober since 2023.   Insomnia.  Continue Ambien 10 mg HS as needed.   Pain and bowel regimen.  As per " physiatry.   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  Continue daptomycin 6 mg/kg IV every 24 hours through 8/26.  Check CPK weekly.  Patient receiving intensive PT OT as per physiatry.     Anticipated date of discharge.  Thursday 8/1/24.

## 2024-07-30 NOTE — PROGRESS NOTES
07/30/24 0901   Pain Assessment   Pain Assessment Tool 0-10   Pain Score 5   Pain Location/Orientation Orientation: Right;Location: Leg   Restrictions/Precautions   Precautions Bed/chair alarms;Fall Risk;Limb alert;Multiple lines;Supervision on toilet/commode   RLE Weight Bearing Per Order WBAT   ROM Restrictions No   Bed-Chair Transfer   Type of Assistance Needed Independent   Comment MI from recliner to w/c   Chair/Bed-to-Chair Transfer CARE Score 6   Transfer Bed/Chair/Wheelchair   Limitations Noted In UE Strength;LE Strength   Toilet Transfer   Type of Assistance Needed Independent   Comment MI from w/c to toilet using grab bars; trial completed to ensure mod I status   Toilet Transfer CARE Score 6   Toilet Transfer   Surface Assessed Raised Toilet   Transfer Technique Stand Pivot   Limitations Noted In UE Strength;LE Strength   Adaptive Equipment Grab Bar   Health Management   Health Management Level Wheelchair   Health Management Level of Assistance Modified independent   Health Management Pt completed medication management activity mod I at w/c level. Pt opened/closed pill bottles and placed simulated pills in the correct spots per instructions; all to improve health management and fine motor manipulation. Pt reports at home routine using daily pill organizer each week to manage medication   Exercise Tools   UE Ergometer 5min forward, 5min backwards; to improve BUE strength/ROM   Theraputty pink theraputty to locate and retrieve items in putty to improve BUE strength/ROM and fine motor manipulation   Other Exercise Tool 1 UE strengthening in bilat UE using 2# DB, 2x15: elbow flexion/extension, protraction/retraction, internal/external rotation, pronation/supination, shoulder elevation/depression; all to improve BUE strength/ROM   Other Exercise Tool 2 sanderbox with 2lb weight in all directions for 2 sets of 15 each to improve BUE strength/ROM   Other Exercise Tool 3 close the box game to improve fine  motor manipulation   Cognition   Overall Cognitive Status WFL   Arousal/Participation Alert;Responsive;Cooperative   Attention Within functional limits   Orientation Level Oriented X4   Memory Within functional limits   Following Commands Follows all commands and directions without difficulty   Additional Activities   Additional Activities Other (Comment)   Additional Activities Comments fxl mobility to and from room to therapy room at w/c level mod I carmelo LANE   Assessment   Treatment Assessment Patient presents agreeable to OT session this AM; recieved in recliner. Pt completed fxl mobility and therapeutic exercise/activity. Pt reports he is lightheaded during session; BP was 98/70 mmHg when seated. After completion of seated activities BP was 110/68 mmHg. Toilet transfer completed in preparation for mod I status at w/c level in the room overnight.  Pt transfers to bed after session reporting fatigue from morning therapy sessions. Pt's barriers to d/c include decreased strength throughout but especially RLE s/p TKA, decreased balance, mildly impaired safety awareness, and decreased activity tolerance; all affect independence in self care and fxl transfers. Pt would benefit from continued skilled OT services in order to address listed barriers and prepare for safe d/c.   Prognosis Good   Problem List Decreased strength;Decreased range of motion;Decreased endurance;Impaired balance;Decreased safety awareness;Pain   Plan   Treatment/Interventions ADL retraining;Functional transfer training;Therapeutic exercise;Endurance training;Patient/family training;Equipment eval/education;Compensatory technique education;OT   Progress Progressing toward goals   OT Therapy Minutes   OT Time In 0901   OT Time Out 1031   OT Total Time (minutes) 90   OT Mode of treatment - Individual (minutes) 1   OT Mode of treatment - Concurrent (minutes) 89   Therapy Time missed   Time missed? No

## 2024-07-30 NOTE — PROGRESS NOTES
07/29/24 1315   Pain Assessment   Pain Assessment Tool 0-10   Pain Score 6   Pain Location/Orientation Orientation: Right;Location: Knee   Restrictions/Precautions   Precautions Bed/chair alarms;Fall Risk;Limb alert;Multiple lines;Supervision on toilet/commode   RLE Weight Bearing Per Order WBAT   Cognition   Overall Cognitive Status WFL   Subjective   Subjective I got really tired after this a.m.   Sit to Stand   Type of Assistance Needed Supervision   Physical Assistance Level No physical assistance   Comment S/MI   Sit to Stand CARE Score 4   Bed-Chair Transfer   Type of Assistance Needed Supervision   Physical Assistance Level No physical assistance   Comment S/MI   Chair/Bed-to-Chair Transfer CARE Score 4   Car Transfer   Reason if not Attempted Environmental limitations   Car Transfer CARE Score 10   Wheel 50 Feet with Two Turns   Type of Assistance Needed Independent   Physical Assistance Level No physical assistance   Wheel 50 Feet with Two Turns CARE Score 6   Wheel 150 Feet   Type of Assistance Needed Independent   Physical Assistance Level No physical assistance   Wheel 150 Feet CARE Score 6   Wheelchair mobility   Does the patient use a wheelchair? 1. Yes   Type of Wheelchair Used 1. Manual   Method Right upper extremity;Left upper extremity;Left lower extremity   Therapeutic Interventions   Balance Seated LE TE on red disc, 2#   Equipment Use   NuStep 6:39 at L1   Assessment   Treatment Assessment Pt presents to PT gym via wheelchair.  Agreeable to session, coming from OT session.  Pt declines ambulation and standing due to fatigue from earlier session.  Struggles on disc for TE.  In need of ongoing interventions to maximize return to PLOF.   Problem List Decreased strength;Decreased range of motion;Decreased endurance;Impaired balance;Decreased safety awareness;Pain   PT Barriers   Physical Impairment Decreased strength;Decreased range of motion;Decreased endurance;Impaired balance;Decreased safety  awareness;Pain;Decreased mobility   Functional Limitation Walking;Transfers;Standing;Stair negotiation;Ramp negotiation;Car transfers   Plan   Treatment/Interventions Functional transfer training;LE strengthening/ROM;Elevations;Therapeutic exercise;Endurance training;Patient/family training;Equipment eval/education;Bed mobility;Gait training   Progress Progressing toward goals   PT Therapy Minutes   PT Time In 1315   PT Time Out 1340   PT Total Time (minutes) 25   PT Mode of treatment - Individual (minutes) 25   PT Mode of treatment - Concurrent (minutes) 0   PT Mode of treatment - Group (minutes) 0   PT Mode of treatment - Co-treat (minutes) 0   PT Mode of Treatment - Total time(minutes) 25 minutes   PT Cumulative Minutes 712     Patient remains OOB in chair, all needs in reach.  Alarm in place and activated.  Encouraged use of call bell, patient verbalizes understanding.

## 2024-07-30 NOTE — PLAN OF CARE
Problem: PAIN - ADULT  Goal: Verbalizes/displays adequate comfort level or baseline comfort level  Description: Interventions:  - Encourage patient to monitor pain and request assistance  - Assess pain using appropriate pain scale  - Administer analgesics based on type and severity of pain and evaluate response  - Implement non-pharmacological measures as appropriate and evaluate response  - Consider cultural and social influences on pain and pain management  - Notify physician/advanced practitioner if interventions unsuccessful or patient reports new pain  Outcome: Progressing     Problem: SAFETY ADULT  Goal: Patient will remain free of falls  Description: INTERVENTIONS:  - Educate patient/family on patient safety including physical limitations  - Instruct patient to call for assistance with activity   - Consult OT/PT to assist with strengthening/mobility   - Keep Call bell within reach  - Keep bed low and locked with side rails adjusted as appropriate  - Keep care items and personal belongings within reach  - Initiate and maintain comfort rounds  - Make Fall Risk Sign visible to staff  - Apply yellow socks and bracelet for high fall risk patients  - Consider moving patient to room near nurses station  Outcome: Progressing       Problem: Knowledge Deficit  Goal: Patient/family/caregiver demonstrates understanding of disease process, treatment plan, medications, and discharge instructions  Description: Complete learning assessment and assess knowledge base.  Interventions:  - Provide teaching at level of understanding  - Provide teaching via preferred learning methods  Outcome: Progressing     Problem: Prexisting or High Potential for Compromised Skin Integrity  Goal: Skin integrity is maintained or improved  Description: INTERVENTIONS:  - Identify patients at risk for skin breakdown  - Assess and monitor skin integrity  - Assess and monitor nutrition and hydration status  - Monitor labs   - Assess for  incontinence   - Turn and reposition patient  - Assist with mobility/ambulation  - Relieve pressure over bony prominences  - Avoid friction and shearing  - Provide appropriate hygiene as needed including keeping skin clean and dry  - Evaluate need for skin moisturizer/barrier cream  - Collaborate with interdisciplinary team   - Patient/family teaching  - Consider wound care consult   Outcome: Progressing     Problem: Nutrition/Hydration-ADULT  Goal: Nutrient/Hydration intake appropriate for improving, restoring or maintaining nutritional needs  Description: Monitor and assess patient's nutrition/hydration status for malnutrition. Collaborate with interdisciplinary team and initiate plan and interventions as ordered.  Monitor patient's weight and dietary intake as ordered or per policy. Utilize nutrition screening tool and intervene as necessary. Determine patient's food preferences and provide high-protein, high-caloric foods as appropriate.     INTERVENTIONS:  - Monitor oral intake, urinary output, labs, and treatment plans  - Assess nutrition and hydration status and recommend course of action  - Evaluate amount of meals eaten  - Assist patient with eating if necessary   - Allow adequate time for meals  - Recommend/ encourage appropriate diets, oral nutritional supplements, and vitamin/mineral supplements  - Provide specific nutrition/hydration education as appropriate  - Include patient/family/caregiver in decisions related to nutrition  Outcome: Progressing

## 2024-07-30 NOTE — ASSESSMENT & PLAN NOTE
Right knee septic arthritis. In the setting of a previous traumatic injury with hardware failure requiring hardware removal and now has the development of a sinus tract extending into the joint space with operative findings consistent with a septic arthritis. Although previous cultures have been negative they were obtained while he has been on antibiotics. He is now status post I&D, and placement of a spacer on 7/15/2024. Patient discharged to complete a 6 week course of IV daptomycin.  Cultures were held an additional 14 days and remain negative to date.    Patient doing well on the daptomycin.  Cultures reviewed and remain negative to date.  Labs show an increased wbc and alk phos - patient states GI issue over the weekend but now better.  Suggest repeat labs tomorrow prior to his discharge home to ensure they are going the right direction.      Confirmed with Dr Hendrix the plan for the spacer is to remain in place as long an possible.  Therefore will plan to transition patient to oral doxycycline for an additional 4.5 months after he completes his IV daptomycin.     Plan  - continue daptomycin 500  mg po daily x 6 weeks through 8/26/24  - once he completes the daptomycin will transition to doxcycline for an additional 4.5 months  - weekly CBCD, CMP, CK while he is on the daptomycin  - would recheck labs tomorrow prior to his discharge home  - continue follow up with ortho  - RTO 2 weeks - can be virtual

## 2024-07-30 NOTE — CASE MANAGEMENT
CM met with patient, reviewed today's team meeting information, including d/c date still 8/1, with home care for IV abx, RN/PT/OT. Patient made aware [facility] nurse will be at his house day of discharge for the 2:00 PM dose. Patient stated his wife will be here in AM to transport home. Patient made aware of cost of abx and supplies $520.00, which will be billed to him. CM made patient aware Dr. Marks will be taking out some of the sutures, ortho appt is 8/12 at12:45 at [facility] location. Patient stated he has been to that location before, and is comfortable going there. CM will continue to follow.

## 2024-07-30 NOTE — PROGRESS NOTES
PM&R PROGRESS NOTE:  Aaron Richards 59 y.o. male MRN: [id number]  Unit/Bed#: -01 Encounter: [id number]        Rehab Diagnosis: Orthopedic Disorders:  08.61  Unilateral Knee Replacement  Etiologic Diagnosis:   s/p R antibiotic cement TKA with Dr. 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 Prather: 59 year old M with PMH of HTN, PAD, R TMA, HLD, DM2, CKD, GERD, diabetic neuropathy who had history of traumatic HW failure requiring removal of HW and later developed sinus track extending into jt space with op finding of septic arthritis who underwent complicated R hand crafted abx cement TKA with augmentation by Dr Hendrix on 7/15. ID consulted and recommended IV Dapto for 6 week course with closely monitoring labs. Post-op course notable for pain, leukocytosis, anemia, and significant decline in ADLs and mobility.     SUBJECTIVE: Patient seen face to face this morning in OT.  No further nausea or vomiting.  Has loose stool yesterday, but now formed as of today.  Denies abdominal pain, fevers, chills.  Back to eating at baseline now.  BP and orthostasis improved.  Participation in rehab going well.    Personally visualized incision - less swollen today and no drainage for 24 hours.  Will reach out to his surgeon today.      ASSESSMENT: Stable, progressing      PLAN:    Rehabilitation  Continue current rehabilitation plan of care to maximize function.    I personally attended, reviewed, and discussed medical and functional updates in team conference today.  Please refer to advance care planning note for details.  Estimated Discharge: Thursday, 8/1/2024 with home health care RN, PT, OT    Current Function:   Physical Therapy Occupational Therapy Speech Therapy   Weight Bearing Status: Weight Bearing as Tolerated  Transfers: Independent  Bed Mobility: Independent  Amulation Distance (ft): 130 feet  Ambulation: Incidental Touching, Minimal Assistance  Assistive  Device for Ambulation: Roller Walker  Wheelchair Mobility Distance: 300 ft  Wheelchair Mobility: Independent  Number of Stairs: 6  Assistive Device for Stairs: Right Hand Rail  Stair Assistance: Minimal Assistance  Ramp:  (TBA)  Assistive Device for Ramp:  (TBA)  Discharge Recommendations: Home with:  DC Home with:: Family Support, Home Physical Therapy   Eating: Independent  Grooming: Independent  Bathing: Supervision  Bathing: Supervision  Upper Body Dressing: Independent  Lower Body Dressing: Supervision  Toileting: Supervision  Tub/Shower Transfer: Supervision  Toilet Transfer: Supervision  Cognition: Within Defined Limits  Orientation: Person, Place, Time, Situation                 DVT prophylaxis  Continue aspirin 81 mg twice daily-per orthopedics    Bladder plan  Continent and voiding    Bowel plan  Continent  Last BM 7/29/2024      * Septic arthritis of knee (HCC)  Assessment & Plan  Prior traumatic HW failure requiring HW removal and then developed sinus tract extending into jt space with op finding c/w septic arthritis per last ID note  - S/p complicated right hand crafted antibiotic cement total knee arthroplasty with augmentation by ortho, Dr Hendrix on 7/15  Weight-bearing as tolerated on affected limb; ROM as tolerated, pillow/blankets under achilles not behind knee while in bed  Comgmt by IM during ARC course   Daptomycin IV through 8/26 for 6 week course - Monitor labs (CBC/BMP and CK weekly) while on Abx; remove PICC after IV abx   7/29 - Leukocytosis 14.2K stable BMP and CK.  Increase in leukocytosis may be related to acute gastroenteritis - will recheck on Wed 7/31/24  Monitor incision/area for infection or dehiscence/impaired healing - decreased serosang drainage, no clear signs of infection again  generalized knee edema stable - pt feels it is improving   Recommend wound care consult - pending   In interim, Change dressing with DSD BID and PRN if soiled to avoid maceration - hopefully can  decrease when improves.  Notify MD of increased drainage, pus, redness, or breakdown of incision   Patient was POD 14 on 7/29/24.  Ortho appt with Dr. Hendrix 8/12/24.  Spoke with Dr. Hendrix 7/30/24.  Ok to remove superior and inferior sutures and leave mid sutures in place   Monitor for signs/symptoms of VTE, atelectasis, acute blood loss anemia, or other infection   Function improving   Continue acute comprehensive interdisciplinary inpatient rehabilitation to include intensive skilled therapies (PT, OT) as outlined with oversight and management by rehabilitation physician as well as inpatient rehab level nursing, case management and weekly interdisciplinary team meetings.   Follow-up with Ortho Sx after d/c and ortho if needed during ARC course     7/30/24          Gastroenteritis  Assessment & Plan  N/V & Diarrhea on 7/27 and 7/28  Resolved now  BMP stable.    Leukocytosis  Assessment & Plan  7/29 - Leukocytosis 14.2K stable BMP and CK.  Increase in leukocytosis may be related to acute gastroenteritis - will recheck on Wed 7/31/24  Discussed with IM   No increased change in incision appearance or palpation  Incision has been dry without drainage for 24 hours now  ID virtual follow-up 7/30/24    Anemia  Assessment & Plan  Hb 10.1>9.7  Consult(ed) IM and comanagement with their service during ARC course   Monitor H/H, vitals, signs/symptoms of acute bleeding      Diabetic neuropathy (HCC)  Assessment & Plan  Optimal DM mgmt  Monitor skin for increased infection/breakdown/wounds which patient is at higher risk for  Skilled PT/OT   Fall precautions      At risk for venous thromboembolism (VTE)  Assessment & Plan  SCDs, ambulation, and aspirin 81mg BID per ortho       Pain  Assessment & Plan  Located in knee - overall well Controlled   Ice PRN   APAP 975mg TID PRN - using this primary  Hydromorphone 2mg Q4H PRN for severe pain.   Monitor for oversedation, AMS/delirium, and respiratory depression   If being  administered - hold opiates, muscle relaxants, benzodiazepines, and gabapentin for oversedation, AMS, or RR<12.  Counseled on and continue to encourage deep breathing/relaxation/behavioral pain management techniques      Poor dentition  Assessment & Plan  ID recommends OMFS eval for possible extractions - this was not obtained on acute PTA per prior providers - will recommend OP follow-up unless concerns in interim - discussed with pt   - Mgmt per IM here     Depression and anxiety  Assessment & Plan  Lexapro   Supportive counseling  Counseled on and continue to encourage deep breathing/relaxation/behavioral management techniques      Type 2 diabetes mellitus with diabetic peripheral angiopathy without gangrene ([facility])  Assessment & Plan  Lab Results   Component Value Date    HGBA1C 6.2 (H) 07/01/2024     - Current management by internal medicine  - Monitor for signs and symptoms of hypoglycemia   - Current meds: Lantus, Lispro per IM   - Consistent carb diabetic diet  - Recommend close monitoring and optimal management during recovery/rehab phase as well       Orthostasis  Assessment & Plan  Acute orthostasis upon arrival to [facility]  Abdominal binder and compression stockings as needed during therapies  Adequate hydration  Internal medicine did discontinue Losartan 7/25/24 7/27 & 7/28 developed acute N/V and diarrhea possible acute gastroenteritis with continued orthostasis.  Received IVF x 2 7/28 & 7/29  Much improved BPs and HRs 7/30/24      PAD (peripheral artery disease) ([facility])  Assessment & Plan  Hx of peripheral arterial disease, with previous right popliteal artery stent, and subsequent TMA   - Overall management per hospitalist during acute course  - Antithrombotic: currently on aspiring 81mg BID (also for VTE proph) per prior providers   - Statin  - Optimal blood pressure and blood sugar control  (Was recommended asp and plavix when last seen by [facility] 4/2023-unclear why not on this)  - OP Vas sx follow-up  "    Hyperlipidemia  Assessment & Plan  Statin     Gastroesophageal reflux disease without esophagitis  Assessment & Plan  Protonix 40 mg every other day    Hypertension, essential  Assessment & Plan  At home: Losartan 25 mg daily  Here: None - due to orthostasis discontinued on 7/25/24 by internal medicine.  Will need to watch carefully.  Internal medicine consulted and co-management with their service  Monitor vitals with and without activity; monitor for orthostasis  Monitor hemoglobin, electrolytes, kidney function, hydration status       Labs, medications, and imaging personally reviewed 07/30/24.      ROS:  A ten point review of systems was completed on 07/30/24  and pertinent positives are listed in subjective section. All other systems reviewed were negative.     OBJECTIVE:   /82 (BP Location: Left arm)   Pulse 76   Temp 98.7 °F (37.1 °C) (Temporal)   Resp 17   Ht 6' 4\" (1.93 m)   Wt 110 kg (241 lb 6.5 oz)   SpO2 96%   BMI 29.38 kg/m²     Physical Exam  Vitals and nursing note reviewed.   Constitutional:       General: He is not in acute distress.  HENT:      Head: Normocephalic and atraumatic.      Nose: Nose normal.      Mouth/Throat:      Mouth: Mucous membranes are moist.   Eyes:      Conjunctiva/sclera: Conjunctivae normal.   Cardiovascular:      Rate and Rhythm: Normal rate and regular rhythm.      Pulses: Normal pulses.   Pulmonary:      Effort: Pulmonary effort is normal.      Breath sounds: Normal breath sounds. No wheezing or rales.   Abdominal:      General: Bowel sounds are normal. There is no distension.      Palpations: Abdomen is soft.      Tenderness: There is no abdominal tenderness. There is no guarding or rebound.   Musculoskeletal:         General: Swelling (right knee) present.      Cervical back: Neck supple.      Comments: Knee ROM improving   Skin:     General: Skin is warm.      Comments: Personally visualized incision with sutures  +Periincisional edema present  No " drainage x 24 hours  See photo below   Neurological:      Mental Status: He is alert and oriented to person, place, and time.      Motor: Weakness present.      Gait: Gait abnormal.   Psychiatric:         Mood and Affect: Mood normal.                  Lab Results   Component Value Date    WBC 14.18 (H) 07/29/2024    HGB 9.7 (L) 07/29/2024    HCT 30.7 (L) 07/29/2024    MCV 94 07/29/2024     (H) 07/29/2024     Lab Results   Component Value Date    SODIUM 138 07/29/2024    K 4.1 07/29/2024     07/29/2024    CO2 27 07/29/2024    BUN 19 07/29/2024    CREATININE 0.65 07/29/2024    GLUC 157 (H) 07/29/2024    CALCIUM 8.4 07/29/2024     Lab Results   Component Value Date    INR 1.08 07/01/2024    INR 1.09 08/15/2023    INR 1.04 08/04/2020    PROTIME 13.9 07/01/2024    PROTIME 13.9 08/15/2023    PROTIME 13.8 08/04/2020           Current Facility-Administered Medications:     acetaminophen (TYLENOL) tablet 975 mg, 975 mg, Oral, Q8H PRN, Laxmi Marks MD, 975 mg at 07/29/24 1423    ascorbic acid (VITAMIN C) tablet 500 mg, 500 mg, Oral, BID, Jessie Bailey PA-C, 500 mg at 07/30/24 0821    aspirin (ECOTRIN LOW STRENGTH) EC tablet 81 mg, 81 mg, Oral, BID, Jessie Bailey PA-C, 81 mg at 07/30/24 0821    atorvastatin (LIPITOR) tablet 40 mg, 40 mg, Oral, Daily With Dinner, Jessie Bailey PA-C, 40 mg at 07/29/24 1721    DAPTOmycin (CUBICIN) 575 mg in sodium chloride 0.9 % 50 mL IVPB, 6 mg/kg (Adjusted), Intravenous, Q24H, Laxmi Marks MD, Last Rate: 100 mL/hr at 07/29/24 1529, 575 mg at 07/29/24 1529    docusate sodium (COLACE) capsule 100 mg, 100 mg, Oral, TID PRN, Laxmi Marks MD    escitalopram (LEXAPRO) tablet 5 mg, 5 mg, Oral, Daily, Jessie Bailey PA-C, 5 mg at 07/30/24 0821    HYDROmorphone (DILAUDID) tablet 2 mg, 2 mg, Oral, Q4H PRN, Laxmi Marks MD    insulin glargine (LANTUS) subcutaneous injection 25 Units 0.25 mL, 25 Units, Subcutaneous, Q12H [facility],  Jessie Bailey PA-C, 25 Units at 07/30/24 0821    insulin lispro (HumALOG/ADMELOG) 100 units/mL subcutaneous injection 1-6 Units, 1-6 Units, Subcutaneous, TID AC, 1 Units at 07/29/24 1721 **AND** Fingerstick Glucose (POCT), , , TID AC, Jessie Bailey PA-C    levothyroxine tablet 175 mcg, 175 mcg, Oral, Early Morning, Jessie Bailey PA-C, 175 mcg at 07/30/24 0520    ondansetron (ZOFRAN-ODT) dispersible tablet 4 mg, 4 mg, Oral, Q6H PRN, NAILA Ruiz, 4 mg at 07/28/24 0808    pantoprazole (PROTONIX) EC tablet 40 mg, 40 mg, Oral, Every Other Day, Jessie Bailey PA-C, 40 mg at 07/29/24 0818    senna (SENOKOT) tablet 8.6 mg, 1 tablet, Oral, BID PRN, Meir Prather MD    zolpidem (AMBIEN) tablet 10 mg, 10 mg, Oral, HS PRN, Jessie Bailey PA-C    Past Medical History:   Diagnosis Date    Broken internal right knee prosthesis ([facility]) 01/2023    Chronic kidney disease     Colon polyp     Constipation 03/09/2023    Diabetes mellitus ([facility])     Disease of thyroid gland     GERD (gastroesophageal reflux disease)     HHS vs DKA 11/16/2018    Hyperlipidemia     Hypertension     Thyroid cancer ([facility])        Patient Active Problem List    Diagnosis Date Noted    Septic arthritis of knee ([facility]) 02/06/2024    Gastroenteritis 07/29/2024    Poor dentition 07/22/2024    Pain 07/22/2024    At risk for venous thromboembolism (VTE) 07/22/2024    Diabetic neuropathy ([facility]) 07/22/2024    Anemia 07/22/2024    Leukocytosis 07/22/2024    Chronic pain of right knee 07/10/2024    Type 2 diabetes mellitus without complication, with long-term current use of insulin ([facility]) 07/10/2024    S/P hardware removal 06/20/2023    MANNY (obstructive sleep apnea) 05/01/2023    Constipation 03/09/2023    Depression and anxiety 03/03/2023    Acquired genu varum of right lower extremity 02/21/2023    MARKELL (acute kidney injury) (HCC) 02/14/2023    Chronic kidney disease-mineral and bone disorder 02/14/2023     Closed fracture of right tibial plateau 02/07/2023    Stage 3b chronic kidney disease (HCC) 11/15/2022    Hyperkalemia 11/15/2022    Alcoholism (HCC) 11/15/2022    Dupuytren's contracture of right hand 06/07/2022    Type 2 diabetes mellitus with diabetic peripheral angiopathy without gangrene (HCC) 02/07/2022    Acquired hypothyroidism 10/18/2021    Acquired absence of right foot (HCC) 03/29/2021    Persistent proteinuria 01/12/2021    Abnormal EKG 12/03/2020    Insomnia 12/03/2020    Orthostasis 10/08/2020    Urinary retention 09/08/2020    Thyroid cancer (HCC) 08/20/2020    PAD (peripheral artery disease) ([facility]) 08/05/2020    Elevated liver enzymes 10/24/2019    Colon polyps 09/12/2019    Right-sided tinnitus 11/26/2018    Hypertension, essential 09/24/2018    Gastroesophageal reflux disease without esophagitis 09/24/2018    Hyperlipidemia 12/17/2009    History of nonadherence to medical treatment 02/20/2008          Laxmi Marks MD    I have spent a total time of 65 minutes on 07/30/24 in caring for this patient including Impressions, Counseling / Coordination of care, Documenting in the medical record, Reviewing / ordering tests, medicine, procedures  , Obtaining or reviewing history  , Communicating with other healthcare professionals , and weekly team conference and discussing sutures and incision with Dr Hendrix (Ortho) .      ** Please Note:  voice to text software may have been used in the creation of this document. Although proof errors in transcription or interpretation are a potential of such software**

## 2024-07-30 NOTE — PROGRESS NOTES
Ambulatory Visit  Name: Aaron Richards      : 1965      MRN: [id number]  Encounter Provider: Osiel Hines PA-C  Encounter Date: 2024   Encounter department: [facility]    Assessment & Plan   1. Pyogenic arthritis of right knee joint, due to unspecified organism (HCC)  Assessment & Plan:  Right knee septic arthritis. In the setting of a previous traumatic injury with hardware failure requiring hardware removal and now has the development of a sinus tract extending into the joint space with operative findings consistent with a septic arthritis. Although previous cultures have been negative they were obtained while he has been on antibiotics. He is now status post I&D, and placement of a spacer on 7/15/2024. Patient discharged to complete a 6 week course of IV daptomycin.  Cultures were held an additional 14 days and remain negative to date.    Patient doing well on the daptomycin.  Cultures reviewed and remain negative to date.  Labs show an increased wbc and alk phos - patient states GI issue over the weekend but now better.  Suggest repeat labs tomorrow prior to his discharge home to ensure they are going the right direction.      Confirmed with Dr Hendrix the plan for the spacer is to remain in place as long an possible.  Therefore will plan to transition patient to oral doxycycline for an additional 4.5 months after he completes his IV daptomycin.     Plan  - continue daptomycin 500  mg po daily x 6 weeks through 24  - once he completes the daptomycin will transition to doxcycline for an additional 4.5 months  - weekly CBCD, CMP, CK while he is on the daptomycin  - would recheck labs tomorrow prior to his discharge home  - continue follow up with ortho  - RTO 2 weeks - can be virtual  2. Poor dentition  Assessment & Plan:  Patient with new hardware in his knee.  -Recommend OMFS evaluation for possible extractions  3. Type 2 diabetes mellitus without  complication, with long-term current use of insulin ([facility])  Assessment & Plan:    Lab Results   Component Value Date    HGBA1C 6.2 (H) 07/01/2024     4. Stage 3b chronic kidney disease ([facility])  Assessment & Plan:  Continue to monitor while on IV antibiotic      History of Present Illness     Aaron Richards is a 59 y.o. male who presents for virtual follow up today regarding Right knee septic arthritis. In the setting of a previous traumatic injury with hardware failure requiring hardware removal and now has the development of a sinus tract extending into the joint space with operative findings consistent with a septic arthritis. Although previous cultures have been negative they were obtained while he has been on antibiotics. He is now status post I&D, and placement of a spacer on 7/15/2024. Patient discharged to complete a 6 week course of IV daptomycin.  Cultures were held an additional 14 days and remain negative to date. Patient is doing well on the daptomycin.  He has no complaints today.  He does report he had a GI issue over the weekend but now feels better. No issues with PICC line.     Review of Systems    Objective     There were no vitals taken for this visit.    Physical Exam  Vitals reviewed.   Constitutional:       General: He is not in acute distress.     Appearance: Normal appearance. He is not ill-appearing, toxic-appearing or diaphoretic.   HENT:      Head: Normocephalic and atraumatic.   Eyes:      General: No scleral icterus.        Right eye: No discharge.         Left eye: No discharge.      Conjunctiva/sclera: Conjunctivae normal.   Pulmonary:      Effort: Pulmonary effort is normal. No respiratory distress.   Musculoskeletal:      Comments: R knee dressing intact   Skin:     Coloration: Skin is not jaundiced or pale.      Findings: No erythema or rash.      Comments: PICC insertion site without erythema or drainage   Neurological:      Mental Status: He is alert and oriented to person, place,  and time.   Psychiatric:         Mood and Affect: Mood normal.         Behavior: Behavior normal.       Administrative Statements         Labs:   7/29/24  Wbc: 14.18  Hgb: 9.7  Plt: 495  Cr: 0.65  AST: 30  ALT: 27  Alk phos: 136  CK: 217    Telemedicine consent    Patient: Aaron Richards  Provider: Osiel Hines PA-C  Provider located at [facility]  [address] [address] [phone]    The patient was identified by name and date of birth. Aaron Richards was informed that this is a telemedicine visit and that the visit is being conducted through the [employer] Teams platform. He agrees to proceed..  My office door was closed. No one else was in the room.  He acknowledged consent and understanding of privacy and security of the video platform. The patient has agreed to participate and understands they can discontinue the visit at any time.    Patient is aware this is a billable service.     I spent 10 minutes with the patient during this visit. Additional time spent on chart/lab review and documentation.

## 2024-07-30 NOTE — NURSING NOTE
Patient resting in bed at this time.  No signs of distress noted.  Dressing CDI to the right lower extremity at this time.  PICC line in place for IV antibiotic infusion.  Bed alarm in place to promote patient safety.  Patient was able to reposition self frequently to promote skin integrity.  Patient declined to bathe at this time.  Patient declined SCDs as ordered for DVT prevention.  Call bell within reach.  Plan of care ongoing.

## 2024-07-31 ENCOUNTER — TELEPHONE (OUTPATIENT)
Dept: INFECTIOUS DISEASES | Facility: CLINIC | Age: 59
End: 2024-07-31

## 2024-07-31 LAB
ALBUMIN SERPL BCG-MCNC: 3 G/DL (ref 3.5–5)
ALP SERPL-CCNC: 151 U/L (ref 34–104)
ALT SERPL W P-5'-P-CCNC: 30 U/L (ref 7–52)
ANION GAP SERPL CALCULATED.3IONS-SCNC: 6 MMOL/L (ref 4–13)
AST SERPL W P-5'-P-CCNC: 36 U/L (ref 13–39)
BASOPHILS # BLD MANUAL: 0 THOUSAND/UL (ref 0–0.1)
BASOPHILS NFR MAR MANUAL: 0 % (ref 0–1)
BILIRUB SERPL-MCNC: 0.43 MG/DL (ref 0.2–1)
BUN SERPL-MCNC: 15 MG/DL (ref 5–25)
CALCIUM ALBUM COR SERPL-MCNC: 9.5 MG/DL (ref 8.3–10.1)
CALCIUM SERPL-MCNC: 8.7 MG/DL (ref 8.4–10.2)
CHLORIDE SERPL-SCNC: 107 MMOL/L (ref 96–108)
CK SERPL-CCNC: 572 U/L (ref 39–308)
CO2 SERPL-SCNC: 26 MMOL/L (ref 21–32)
CREAT SERPL-MCNC: 0.64 MG/DL (ref 0.6–1.3)
EOSINOPHIL # BLD MANUAL: 1.83 THOUSAND/UL (ref 0–0.4)
EOSINOPHIL NFR BLD MANUAL: 13 % (ref 0–6)
ERYTHROCYTE [DISTWIDTH] IN BLOOD BY AUTOMATED COUNT: 13.9 % (ref 11.6–15.1)
GFR SERPL CREATININE-BSD FRML MDRD: 105 ML/MIN/1.73SQ M
GLUCOSE P FAST SERPL-MCNC: 125 MG/DL (ref 65–99)
GLUCOSE SERPL-MCNC: 125 MG/DL (ref 65–140)
GLUCOSE SERPL-MCNC: 139 MG/DL (ref 65–140)
GLUCOSE SERPL-MCNC: 150 MG/DL (ref 65–140)
GLUCOSE SERPL-MCNC: 187 MG/DL (ref 65–140)
GLUCOSE SERPL-MCNC: 222 MG/DL (ref 65–140)
HCT VFR BLD AUTO: 31.6 % (ref 36.5–49.3)
HGB BLD-MCNC: 10 G/DL (ref 12–17)
LYMPHOCYTES # BLD AUTO: 24 % (ref 14–44)
LYMPHOCYTES # BLD AUTO: 3.38 THOUSAND/UL (ref 0.6–4.47)
MCH RBC QN AUTO: 29.6 PG (ref 26.8–34.3)
MCHC RBC AUTO-ENTMCNC: 31.6 G/DL (ref 31.4–37.4)
MCV RBC AUTO: 94 FL (ref 82–98)
METAMYELOCYTE ABSOLUTE CT: 0.14 THOUSAND/UL (ref 0–0.1)
METAMYELOCYTES NFR BLD MANUAL: 1 % (ref 0–1)
MONOCYTES # BLD AUTO: 0.28 THOUSAND/UL (ref 0–1.22)
MONOCYTES NFR BLD: 2 % (ref 4–12)
NEUTROPHILS # BLD MANUAL: 8.45 THOUSAND/UL (ref 1.85–7.62)
NEUTS SEG NFR BLD AUTO: 60 % (ref 43–75)
PLATELET # BLD AUTO: 529 THOUSANDS/UL (ref 149–390)
PLATELET BLD QL SMEAR: ABNORMAL
PMV BLD AUTO: 10.9 FL (ref 8.9–12.7)
POTASSIUM SERPL-SCNC: 4 MMOL/L (ref 3.5–5.3)
PROT SERPL-MCNC: 6.4 G/DL (ref 6.4–8.4)
RBC # BLD AUTO: 3.38 MILLION/UL (ref 3.88–5.62)
RBC MORPH BLD: NORMAL
SODIUM SERPL-SCNC: 139 MMOL/L (ref 135–147)
WBC # BLD AUTO: 14.09 THOUSAND/UL (ref 4.31–10.16)

## 2024-07-31 PROCEDURE — 97537 COMMUNITY/WORK REINTEGRATION: CPT

## 2024-07-31 PROCEDURE — 97530 THERAPEUTIC ACTIVITIES: CPT

## 2024-07-31 PROCEDURE — 80053 COMPREHEN METABOLIC PANEL: CPT | Performed by: PHYSICAL MEDICINE & REHABILITATION

## 2024-07-31 PROCEDURE — 97535 SELF CARE MNGMENT TRAINING: CPT

## 2024-07-31 PROCEDURE — 85027 COMPLETE CBC AUTOMATED: CPT | Performed by: PHYSICAL MEDICINE & REHABILITATION

## 2024-07-31 PROCEDURE — 82550 ASSAY OF CK (CPK): CPT | Performed by: PHYSICAL MEDICINE & REHABILITATION

## 2024-07-31 PROCEDURE — 97116 GAIT TRAINING THERAPY: CPT

## 2024-07-31 PROCEDURE — 85007 BL SMEAR W/DIFF WBC COUNT: CPT | Performed by: PHYSICAL MEDICINE & REHABILITATION

## 2024-07-31 PROCEDURE — 97110 THERAPEUTIC EXERCISES: CPT

## 2024-07-31 PROCEDURE — 82948 REAGENT STRIP/BLOOD GLUCOSE: CPT

## 2024-07-31 RX ORDER — ATORVASTATIN CALCIUM 40 MG/1
40 TABLET, FILM COATED ORAL
Qty: 30 TABLET | Refills: 0 | Status: SHIPPED | OUTPATIENT
Start: 2024-07-31

## 2024-07-31 RX ORDER — ASCORBIC ACID 500 MG
500 TABLET ORAL 2 TIMES DAILY
Qty: 60 TABLET | Refills: 0 | Status: SHIPPED | OUTPATIENT
Start: 2024-07-31

## 2024-07-31 RX ORDER — LEVOTHYROXINE SODIUM 175 UG/1
175 TABLET ORAL
Qty: 30 TABLET | Refills: 0 | Status: SHIPPED | OUTPATIENT
Start: 2024-07-31

## 2024-07-31 RX ORDER — ONDANSETRON 4 MG/1
4 TABLET, ORALLY DISINTEGRATING ORAL EVERY 6 HOURS PRN
Qty: 15 TABLET | Refills: 0 | Status: SHIPPED | OUTPATIENT
Start: 2024-07-31

## 2024-07-31 RX ORDER — ESCITALOPRAM OXALATE 5 MG/1
5 TABLET ORAL DAILY
Qty: 30 TABLET | Refills: 0 | Status: SHIPPED | OUTPATIENT
Start: 2024-07-31

## 2024-07-31 RX ORDER — ZOLPIDEM TARTRATE 10 MG/1
10 TABLET ORAL
Qty: 10 TABLET | Refills: 0 | Status: SHIPPED | OUTPATIENT
Start: 2024-07-31 | End: 2024-08-14

## 2024-07-31 RX ORDER — OMEPRAZOLE 40 MG/1
40 CAPSULE, DELAYED RELEASE ORAL EVERY OTHER DAY
Qty: 15 CAPSULE | Refills: 0 | Status: SHIPPED | OUTPATIENT
Start: 2024-07-31

## 2024-07-31 RX ORDER — INSULIN GLARGINE 100 [IU]/ML
25 INJECTION, SOLUTION SUBCUTANEOUS EVERY 12 HOURS
Qty: 15 ML | Refills: 0 | Status: SHIPPED | OUTPATIENT
Start: 2024-07-31

## 2024-07-31 RX ADMIN — DAPTOMYCIN 575 MG: 500 INJECTION, POWDER, LYOPHILIZED, FOR SOLUTION INTRAVENOUS at 13:50

## 2024-07-31 RX ADMIN — ASPIRIN 81 MG: 81 TABLET, COATED ORAL at 08:08

## 2024-07-31 RX ADMIN — INSULIN LISPRO 1 UNITS: 100 INJECTION, SOLUTION INTRAVENOUS; SUBCUTANEOUS at 07:45

## 2024-07-31 RX ADMIN — ATORVASTATIN CALCIUM 40 MG: 40 TABLET, FILM COATED ORAL at 17:11

## 2024-07-31 RX ADMIN — INSULIN GLARGINE 25 UNITS: 100 INJECTION, SOLUTION SUBCUTANEOUS at 21:03

## 2024-07-31 RX ADMIN — LEVOTHYROXINE SODIUM 175 MCG: 100 TABLET ORAL at 05:22

## 2024-07-31 RX ADMIN — DOCUSATE SODIUM 100 MG: 100 CAPSULE, LIQUID FILLED ORAL at 08:09

## 2024-07-31 RX ADMIN — INSULIN LISPRO 1 UNITS: 100 INJECTION, SOLUTION INTRAVENOUS; SUBCUTANEOUS at 11:45

## 2024-07-31 RX ADMIN — INSULIN GLARGINE 25 UNITS: 100 INJECTION, SOLUTION SUBCUTANEOUS at 08:07

## 2024-07-31 RX ADMIN — PANTOPRAZOLE SODIUM 40 MG: 40 TABLET, DELAYED RELEASE ORAL at 08:08

## 2024-07-31 RX ADMIN — OXYCODONE HYDROCHLORIDE AND ACETAMINOPHEN 500 MG: 500 TABLET ORAL at 08:08

## 2024-07-31 RX ADMIN — OXYCODONE HYDROCHLORIDE AND ACETAMINOPHEN 500 MG: 500 TABLET ORAL at 17:11

## 2024-07-31 RX ADMIN — ESCITALOPRAM OXALATE 5 MG: 5 TABLET, FILM COATED ORAL at 08:08

## 2024-07-31 RX ADMIN — ACETAMINOPHEN 975 MG: 325 TABLET ORAL at 21:03

## 2024-07-31 RX ADMIN — ASPIRIN 81 MG: 81 TABLET, COATED ORAL at 17:11

## 2024-07-31 NOTE — TELEPHONE ENCOUNTER
----- Message from Osiel Hines PA-C sent at 7/31/2024 11:31 AM EDT -----  Regarding: labs  Notified of elevated CK today.  As long as patient does not have muscle pains the plan is to continue the daptomycin as ordered.  He will be discharged to home tomorrow.  Please ask [facility] to check his labs on Monday.  If his CK continues to rise we can consider changing to linezolid (h/o mrsa bacteremia in the past and MARKELL with Vanco) to complete the initial 6 weeks.  I will also reach out to [employer] to see if they still have specimen for 16S testing at [facility].    Thanks   Osiel

## 2024-07-31 NOTE — TELEPHONE ENCOUNTER
Spoke with Savita, patient's nurse.  Requested information about how he is feeling.  Patient reports that he is not experiencing muscle pain at this time.  Patient will be discharged tomorrow.  Savita is going to note that [facility] should begin weekly labs on Monday.    Spoke with [facility], confirmed SOC for tomorrow.  They confirm they will start weekly labs for patient on Monday 8/5.  They confirm receipt of the orders I faxed to them.

## 2024-07-31 NOTE — NURSING NOTE
Delivery of Daptomycin medication received from ([facility] pharmacy and placed in fridge at nurses station.  Pt. Is to take medication home with him tomorrow when discharged.

## 2024-07-31 NOTE — PROGRESS NOTES
07/31/24 0830   Pain Assessment   Pain Assessment Tool 0-10   Pain Score 4   Pain Location/Orientation Orientation: Right;Location: Knee   Restrictions/Precautions   Precautions Bed/chair alarms;Fall Risk;Limb alert;Pain   RLE Weight Bearing Per Order WBAT   Cognition   Overall Cognitive Status WFL   Arousal/Participation Alert;Responsive;Cooperative   Attention Within functional limits   Orientation Level Oriented X4   Memory Within functional limits   Following Commands Follows all commands and directions without difficulty   Roll Left and Right   Type of Assistance Needed Independent   Roll Left and Right CARE Score 6   Sit to Lying   Type of Assistance Needed Independent   Sit to Lying CARE Score 6   Lying to Sitting on Side of Bed   Type of Assistance Needed Independent   Lying to Sitting on Side of Bed CARE Score 6   Sit to Stand   Type of Assistance Needed Independent   Comment RW   Sit to Stand CARE Score 6   Bed-Chair Transfer   Type of Assistance Needed Independent   Comment mod I sit pivot wheelchair<>NuStep   Chair/Bed-to-Chair Transfer CARE Score 6   Walk 10 Feet   Type of Assistance Needed Incidental touching;Verbal cues   Comment cg/close S level and unlevel surfaces with walker   Walk 10 Feet CARE Score 4   Walk 50 Feet with Two Turns   Type of Assistance Needed Incidental touching;Verbal cues   Comment cg/close S level and unlevel surfaces with walker   Walk 50 Feet with Two Turns CARE Score 4   Walking 10 Feet on Uneven Surfaces   Type of Assistance Needed Incidental touching;Verbal cues   Comment cg/close S level and unlevel surfaces with walker   Walking 10 Feet on Uneven Surfaces CARE Score 4   Ambulation   Primary Mode of Locomotion Prior to Admission Walk   Distance Walked (feet) 113 ft  (111' 113')   Assist Device Roller Walker   Gait Pattern Antalgic;Slow Lisa;Decreased foot clearance;Forward Flexion;Narrow YUMIKO;Step to;Step through;Decreased R stance;Improper weight shift   Limitations  Noted In Balance;Device Management;Endurance;Heel Strike;Posture;Sensation;Speed;Strength;Swing   Provided Assistance with: Balance   Walk Assist Level Contact Guard;Close Supervision   Findings cg/close S level and unlevel surfaces with walker   Does the patient walk? 2. Yes   Wheel 50 Feet with Two Turns   Type of Assistance Needed Independent   Wheel 50 Feet with Two Turns CARE Score 6   Wheel 150 Feet   Type of Assistance Needed Independent   Wheel 150 Feet CARE Score 6   Curb or Single Stair   Style negotiated Single stair   Type of Assistance Needed Physical assistance;Incidental touching;Verbal cues   Physical Assistance Level 25% or less   Comment cg/occ MIN A using 2 handrails   1 Step (Curb) CARE Score 3   4 Steps   Type of Assistance Needed Physical assistance;Incidental touching;Verbal cues   Physical Assistance Level 25% or less   Comment cg/occ MIN A using 2 handrails   4 Steps CARE Score 3   12 Steps   Reason if not Attempted Activity not applicable   12 Steps CARE Score 9   Stairs   Type Stairs   # of Steps 4   Weight Bearing Precautions WBAT   Assist Devices Bilateral Rail   Findings cg/occ MIN A using 2 handrails   Therapeutic Interventions   Strengthening seated ther ex 2 lb weights   Flexibility RLE stretching   Balance gait and transfer training with walker`   Other stair negotiation   Equipment Use   NuStep L1 7 min   Assessment   Treatment Assessment Patient agreeable to therapy session.  Pain in R knee 4/10.   Patient remains mod I wheelchair level.  Occasional cues for general safety.  BP just prior to therapy session 154/76 lying, 136/75 sitting, 116/56 standing with mild dizziness that resolved quickly.   Patient with no complaints of dizziness or feeling lightheaded during therapy session.  Increased fatigue noted throughout session, requiring frequent rest breaks.  Completed ther ex for general LE strengthening; gait and transfer training focusing on sequence and technique for improved  balance and safety with functional mobility using RW.  Patient MOD I wheelchair mobility level and unlevel surfaces.  Negotiated steps with 2 handrails cg/occ min A.   PT Barriers   Physical Impairment Decreased strength;Decreased range of motion;Decreased endurance;Impaired balance;Decreased safety awareness;Impaired sensation;Orthopedic restrictions;Pain   Functional Limitation Walking;Transfers;Standing;Stair negotiation;Ramp negotiation;Car transfers   Plan   Treatment/Interventions Functional transfer training;LE strengthening/ROM;Elevations;Therapeutic exercise;Bed mobility;Gait training   Progress Progressing toward goals   PT Therapy Minutes   PT Time In 0830   PT Time Out 1000   PT Total Time (minutes) 90   PT Mode of treatment - Individual (minutes) 90   PT Mode of treatment - Concurrent (minutes) 0   PT Mode of treatment - Group (minutes) 0   PT Mode of treatment - Co-treat (minutes) 0   PT Mode of Treatment - Total time(minutes) 90 minutes   PT Cumulative Minutes 894   Therapy Time missed   Time missed? No

## 2024-07-31 NOTE — PROGRESS NOTES
"Progress Note - Aaron Richards 59 y.o. male MRN: [id number]    Unit/Bed#: -01 Encounter: [id number]        Subjective:   Patient seen and examined at bedside after reviewing the chart and discussing the case with the caring staff.      Patient examined at bedside.  Patient denies any acute symptoms.     Patient had appointment with ID yesterday 7/30 with plan to continue IV daptomycin 500 mg daily x 6 weeks through 8/26/24 then transition to doxycycline for additional 4.5 months.    CK level 572 this morning.  Denies muscle pain.  Discussed with ID.  Will continue with daptomycin as ordered.  He will have labs rechecked by [facility] on Monday and patient is to notify ID should he develop any muscle pain at home.    Physical Exam   Vitals: Blood pressure 116/56, pulse 82, temperature 98.1 °F (36.7 °C), temperature source Temporal, resp. rate 20, height 6' 4\" (1.93 m), weight 110 kg (241 lb 6.5 oz), SpO2 98%.,Body mass index is 29.38 kg/m².  Constitutional: Patient in no acute distress.  HEENT: PERR, EOMI, MMM.  Cardiovascular: Normal rate and regular rhythm.    Pulmonary/Chest: Effort normal and breath sounds normal.   Abdomen: Soft, + BS, NT.    Assessment/Plan:  Aaron Richards is a(n) 59 y.o. 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  Continue ASA 81 mg twice daily (for 4 weeks for ortho DVT ppx, then back to daily) and atorvastatin 40 mg daily.  Home:  irbesartan/hydrochlorothiazide 150-12.5 mg daily.  Discontinued all BP medication due to orthostasis.  Abdominal binder.  Trial saltines prior to shower.   DM2.  Hgb A1c 6.2%.  Continue Lantus 25 units twice daily.  SSI w/ POCT AS HS.  Home: Lantus 30 units BID and Apidra SSI.   Acquired hypothyroidism. Hx thyroid cancer s/p thyroidectomy.   Continue levothyroxine 175 mcg daily.   GERD.  Continue Protonix 40 mg daily.   CKD3b.  Stable.  Cr 0.65 on 7/22.  Avoid nephrotoxins.   MDD/anxiety.  " Continue Lexapro 20 mg daily.   Hx of alcohol use.  Stable.  Sober since 2023.   Insomnia.  Continue Ambien 10 mg HS as needed.   Pain and bowel regimen.  As per physiatry.   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  Continue daptomycin 6 mg/kg IV every 24 hours through 8/26 then transitioned to oral doxycycline for additional 4.5 months.  Check CPK weekly.  Patient receiving intensive PT OT as per physiatry.     Anticipated date of discharge.  Thursday 8/1/24.    The patient was discussed with Dr. Zepeda and he is in agreement with the above note.

## 2024-07-31 NOTE — CASE MANAGEMENT
GAL called [facility], spoke with Angela, who reported IV abx and supplies will be delivered to patient's bedside this afternoon.  GAL received phone call from Jackie at [facility] , confirming SOC tomorrow 8/1 with 2:00 PM dose of antibiotics. GAL made Jackie aware patient's wife will be there for education.

## 2024-07-31 NOTE — PLAN OF CARE
Problem: PAIN - ADULT  Goal: Verbalizes/displays adequate comfort level or baseline comfort level  Description: Interventions:  - Encourage patient to monitor pain and request assistance  - Assess pain using appropriate pain scale  - Administer analgesics based on type and severity of pain and evaluate response  - Implement non-pharmacological measures as appropriate and evaluate response  - Consider cultural and social influences on pain and pain management  - Notify physician/advanced practitioner if interventions unsuccessful or patient reports new pain  Outcome: Progressing     Problem: DISCHARGE PLANNING  Goal: Discharge to home or other facility with appropriate resources  Description: INTERVENTIONS:  - Identify barriers to discharge w/patient and caregiver  - Arrange for needed discharge resources and transportation as appropriate  - Identify discharge learning needs (meds, wound care, etc.)  - Arrange for interpretive services to assist at discharge as needed  - Refer to Case Management Department for coordinating discharge planning if the patient needs post-hospital services based on physician/advanced practitioner order or complex needs related to functional status, cognitive ability, or social support system  Outcome: Progressing     Problem: Knowledge Deficit  Goal: Patient/family/caregiver demonstrates understanding of disease process, treatment plan, medications, and discharge instructions  Description: Complete learning assessment and assess knowledge base.  Interventions:  - Provide teaching at level of understanding  - Provide teaching via preferred learning methods  Outcome: Progressing     Problem: Nutrition/Hydration-ADULT  Goal: Nutrient/Hydration intake appropriate for improving, restoring or maintaining nutritional needs  Description: Monitor and assess patient's nutrition/hydration status for malnutrition. Collaborate with interdisciplinary team and initiate plan and interventions as  ordered.  Monitor patient's weight and dietary intake as ordered or per policy. Utilize nutrition screening tool and intervene as necessary. Determine patient's food preferences and provide high-protein, high-caloric foods as appropriate.     INTERVENTIONS:  - Monitor oral intake, urinary output, labs, and treatment plans  - Assess nutrition and hydration status and recommend course of action  - Evaluate amount of meals eaten  - Assist patient with eating if necessary   - Allow adequate time for meals  - Recommend/ encourage appropriate diets, oral nutritional supplements, and vitamin/mineral supplements  - Order, calculate, and assess calorie counts as needed  - Recommend, monitor, and adjust tube feedings and TPN/PPN based on assessed needs  - Assess need for intravenous fluids  - Provide specific nutrition/hydration education as appropriate  - Include patient/family/caregiver in decisions related to nutrition  Outcome: Progressing

## 2024-07-31 NOTE — NURSING NOTE
Tolerating therapy.  Dressing right incisional site changed x 2 today, once was wrapped with kerlix and kept falling down.  Next time dressing was changed it was secured with paper tape.  Both dressing changes were aseptic technique.  Incisional areas were clean and dry with no s/s infection.  Sutures remained intact.  Picc line maintained RUE with no s/s infection.  Notified [facility] regarding elevated WBC and other labs today.  Pt for discharge home tomorrow with IV daptomycin medication received to unit earlier today.

## 2024-07-31 NOTE — NURSING NOTE
Patient resting in bed at this time.  No signs of distress noted.  Patient with right knee dressing CDI at this time.  PICC line to the right upper arm for IV antibiotic administration.  Patient was modified independent to the bathroom with a wheelchair overnight.  Call bell within reach.  Plan of care ongoing.

## 2024-07-31 NOTE — PROGRESS NOTES
07/31/24 1130   Pain Assessment   Pain Assessment Tool 0-10   Pain Score 4   Pain Location/Orientation Orientation: Right;Location: Knee   Restrictions/Precautions   Precautions Limb alert;Pain   RLE Weight Bearing Per Order WBAT   Putting On/Taking Off Footwear   Type of Assistance Needed Independent   Comment seated EOB   Putting On/Taking Off Footwear CARE Score 6   Bed-Chair Transfer   Type of Assistance Needed Independent   Comment mod I sit pivot from EOB to w/c   Chair/Bed-to-Chair Transfer CARE Score 6   Exercise Tools   Other Exercise Tool 1 UE strengthening in bilat UE using 2# DB, 2x15: elbow flexion/extension, protraction/retraction, internal/external rotation, pronation/supination, shoulder elevation/depression; to improve BUE strength/ROM   Cognition   Overall Cognitive Status WFL   Arousal/Participation Alert;Responsive;Cooperative   Attention Within functional limits   Orientation Level Oriented X4   Memory Within functional limits   Following Commands Follows all commands and directions without difficulty   Assessment   Treatment Assessment Patient presents agreeable to brief OT session this AM; recieved seated EOB. Pt completed ADL training, fxl mobility, and therapeutic exercise/activity. Pt reports he is eagar for upcoming d/c and mild pain in R knee. Pt's barriers to d/c include decreased strength throughout but especially RLE s/p TKA, decreased balance, mildly impaired safety awareness, and decreased activity tolerance; all affect independence in self care and fxl transfers. Pt would benefit from continued skilled OT services in order to address listed barriers and prepare for safe d/c.   Prognosis Good   Problem List Decreased strength;Decreased range of motion;Decreased endurance;Impaired balance;Decreased safety awareness;Pain   Plan   Treatment/Interventions ADL retraining;Functional transfer training;Therapeutic exercise;Endurance training;Patient/family training;Equipment  eval/education;Compensatory technique education;OT   Progress Progressing toward goals   OT Therapy Minutes   OT Time In 1130   OT Time Out 1200   OT Total Time (minutes) 30   OT Mode of treatment - Individual (minutes) 30   Therapy Time missed   Time missed? No

## 2024-07-31 NOTE — PROGRESS NOTES
07/31/24 1230   Pain Assessment   Pain Assessment Tool 0-10   Pain Score 4   Pain Location/Orientation Orientation: Right;Location: Knee   Restrictions/Precautions   Precautions Limb alert;Pain   RLE Weight Bearing Per Order WBAT   Oral Hygiene   Comment pt declined; will complete on his own   Shower/Bathe Self   Type of Assistance Needed Independent;Adaptive equipment   Shower/Bathe Self CARE Score 6   Bathing   Assessed Bath Style Shower   Anticipated D/C Bath Style Shower;Sponge Bath   Able to Gather/Transport Yes   Able to Adjust Water Temperature Yes   Able to Wash/Rinse/Dry (body part) Left Arm;Right Arm;R Upper Leg;L Upper Leg;L Lower Leg/Foot;Chest;Abdomen;Perineal Area;Buttocks   Limitations Noted in Safety;Strength   Positioning Seated   Adaptive Equipment Shower Seat;Hand Held Shower   Findings  RLE covered with cast covering; PICC line covered with palstic bag   Tub/Shower Transfer   Limitations Noted In Safety;UE Strength;LE Strength   Adaptive Equipment Grab Bars;Transfer Bench   Assessed Shower   Findings mod I   Upper Body Dressing   Type of Assistance Needed Independent   Upper Body Dressing CARE Score 6   Lower Body Dressing   Type of Assistance Needed Independent   Lower Body Dressing CARE Score 6   Putting On/Taking Off Footwear   Type of Assistance Needed Independent   Putting On/Taking Off Footwear CARE Score 6   Dressing/Undressing Clothing   Able to  Obtain Clothing;Store removed clothing   Remove UB Clothes Pullover Shirt   Don UB Clothes Pullover Shirt   Remove LB Clothes Shorts;Undergarment;Socks;Shoes   Don LB Clothes Shorts;Undergarment;Socks;Shoes   Limitations Noted In Safety;Strength   Positioning Supported Sit;Standing   Sit to Stand   Type of Assistance Needed Independent   Comment with grab bars   Sit to Stand CARE Score 6   Bed-Chair Transfer   Type of Assistance Needed Independent   Comment mod I sit pivot   Chair/Bed-to-Chair Transfer CARE Score 6   Transfer  Bed/Chair/Wheelchair   Limitations Noted In UE Strength;LE Strength   Toileting Hygiene   Comment pt declined; will complete on his own   Exercise Tools   UE Ergometer 5min forward, 5 min backwards; then 6 minutes forward and backwards; all to improve BUE strength/ROM   Other Exercise Tool 1 close the box game to improve fine motor manipulation   Cognition   Overall Cognitive Status WFL   Arousal/Participation Alert;Responsive;Cooperative   Attention Within functional limits   Orientation Level Oriented X4   Memory Within functional limits   Following Commands Follows all commands and directions without difficulty   Other Comments   Assessment Pt participated in 60 minutes of concurrent tx addressing simialr goals with a pt with specific deficits which aided in activity engagement and socialization.   Assessment   Treatment Assessment Patient presents agreeable to OT session this PM; recieved in recliner. Pt completed ADL training, fxl mobility, and therapeutic exercise/activity. Pt mod I in all ADL's and fxl transfers completed in preparation for upcoming d/c. Pt reports he has no worries for upcoming d/c, however wife is a little worried about new self care requirements. Pt requests to repeatedly do UE ergometer to exercise BUE for majority of session. Pt's barriers are the same as listed in the prior session. Pt would benefit from continued skilled OT services in order to address listed barriers and prepare for safe d/c.   Prognosis Good   Problem List Decreased strength;Decreased range of motion;Decreased endurance;Impaired balance;Decreased safety awareness;Pain   Plan   Treatment/Interventions ADL retraining;Functional transfer training;Therapeutic exercise;Endurance training;Equipment eval/education;Patient/family training;Compensatory technique education;OT   Progress Progressing toward goals   Discharge Recommendation   Discharge Summary Pt eager for d/c from ARU with all ADL and fxl transfers mod I at w/c  level. Pt completed ADL/IADL training, fxl mobility, and therapeutic exercise/activity during stay. All goals are met and pt has necessary DME in preparation for d/c home with wife. Continued barriers include decreased generalized strength. Pt will follow through with [facility].   OT Therapy Minutes   OT Time In 1230   OT Time Out 1351   OT Total Time (minutes) 81   OT Mode of treatment - Individual (minutes) 21   OT Mode of treatment - Concurrent (minutes) 60   Therapy Time missed   Time missed? No

## 2024-08-01 ENCOUNTER — TRANSITIONAL CARE MANAGEMENT (OUTPATIENT)
Dept: FAMILY MEDICINE CLINIC | Facility: CLINIC | Age: 59
End: 2024-08-01

## 2024-08-01 VITALS
HEIGHT: 76 IN | WEIGHT: 241.4 LBS | OXYGEN SATURATION: 96 % | HEART RATE: 75 BPM | RESPIRATION RATE: 18 BRPM | SYSTOLIC BLOOD PRESSURE: 149 MMHG | TEMPERATURE: 97.7 F | DIASTOLIC BLOOD PRESSURE: 79 MMHG | BODY MASS INDEX: 29.4 KG/M2

## 2024-08-01 LAB — GLUCOSE SERPL-MCNC: 129 MG/DL (ref 65–140)

## 2024-08-01 PROCEDURE — 82948 REAGENT STRIP/BLOOD GLUCOSE: CPT

## 2024-08-01 PROCEDURE — 97530 THERAPEUTIC ACTIVITIES: CPT | Performed by: PHYSICAL THERAPIST

## 2024-08-01 PROCEDURE — 97110 THERAPEUTIC EXERCISES: CPT | Performed by: PHYSICAL THERAPIST

## 2024-08-01 PROCEDURE — 97530 THERAPEUTIC ACTIVITIES: CPT

## 2024-08-01 PROCEDURE — 97542 WHEELCHAIR MNGMENT TRAINING: CPT | Performed by: PHYSICAL THERAPIST

## 2024-08-01 RX ORDER — DOCUSATE SODIUM 100 MG/1
100 CAPSULE, LIQUID FILLED ORAL 2 TIMES DAILY PRN
Qty: 60 CAPSULE | Refills: 0 | Status: SHIPPED | OUTPATIENT
Start: 2024-08-01

## 2024-08-01 RX ORDER — ACETAMINOPHEN 500 MG
1000 TABLET ORAL 3 TIMES DAILY PRN
Qty: 60 TABLET | Refills: 0 | Status: SHIPPED | OUTPATIENT
Start: 2024-08-01

## 2024-08-01 RX ADMIN — INSULIN GLARGINE 25 UNITS: 100 INJECTION, SOLUTION SUBCUTANEOUS at 09:01

## 2024-08-01 RX ADMIN — LEVOTHYROXINE SODIUM 175 MCG: 100 TABLET ORAL at 05:31

## 2024-08-01 RX ADMIN — OXYCODONE HYDROCHLORIDE AND ACETAMINOPHEN 500 MG: 500 TABLET ORAL at 09:01

## 2024-08-01 RX ADMIN — ASPIRIN 81 MG: 81 TABLET, COATED ORAL at 09:01

## 2024-08-01 RX ADMIN — ESCITALOPRAM OXALATE 5 MG: 5 TABLET, FILM COATED ORAL at 09:01

## 2024-08-01 NOTE — PROGRESS NOTES
08/01/24 0857   Pain Assessment   Pain Assessment Tool 0-10   Pain Score 4   Restrictions/Precautions   Precautions Fall Risk;Pain   RLE Weight Bearing Per Order WBAT   ROM Restrictions No   Cognition   Overall Cognitive Status WFL   Subjective   Subjective I'm feeling good, no concerns   Roll Left and Right   Type of Assistance Needed Independent   Physical Assistance Level No physical assistance   Roll Left and Right CARE Score 6   Sit to Lying   Type of Assistance Needed Independent   Physical Assistance Level No physical assistance   Sit to Lying CARE Score 6   Lying to Sitting on Side of Bed   Type of Assistance Needed Independent   Physical Assistance Level No physical assistance   Lying to Sitting on Side of Bed CARE Score 6   Sit to Stand   Type of Assistance Needed Independent   Physical Assistance Level No physical assistance   Sit to Stand CARE Score 6   Bed-Chair Transfer   Type of Assistance Needed Independent   Physical Assistance Level No physical assistance   Chair/Bed-to-Chair Transfer CARE Score 6   Car Transfer   Reason if not Attempted Environmental limitations   Car Transfer CARE Score 10   Walk 10 Feet   Type of Assistance Needed Independent   Physical Assistance Level No physical assistance   Comment RW   Walk 10 Feet CARE Score 6   Walk 50 Feet with Two Turns   Comment Deferred due to wanting to conserve energy   Walk 150 Feet   Comment Deferred by patient   Walking 10 Feet on Uneven Surfaces   Comment Deferred by patient   Ambulation   Primary Mode of Locomotion Prior to Admission Walk   Does the patient walk? 2. Yes   Wheel 50 Feet with Two Turns   Type of Assistance Needed Independent   Physical Assistance Level No physical assistance   Wheel 50 Feet with Two Turns CARE Score 6   Wheel 150 Feet   Type of Assistance Needed Independent   Physical Assistance Level No physical assistance   Wheel 150 Feet CARE Score 6   Wheelchair mobility   Does the patient use a wheelchair? 1. Yes   Type  of Wheelchair Used 1. Manual   Therapeutic Interventions   Strengthening HEP reviewed for performance at home   Flexibility Review of hamstring stretch, heel cord stretch   Other Review of energy conservation techniques   Equipment Use   NuStep L1 10 mins   Assessment   Treatment Assessment Patient has made stedy progress with PT.  Planned discharge home today with home PT.  Pt independent wheelchair level and with short distances.  No concerns with discharge and HEP in place.   Problem List Decreased strength;Decreased endurance;Impaired balance;Decreased mobility;Decreased skin integrity   PT Barriers   Physical Impairment Decreased strength;Decreased range of motion;Decreased endurance;Impaired balance;Decreased safety awareness;Impaired sensation;Orthopedic restrictions;Pain   Functional Limitation Walking;Transfers;Standing;Stair negotiation;Ramp negotiation;Car transfers   Plan   Treatment/Interventions Functional transfer training;LE strengthening/ROM;Elevations;Therapeutic exercise;Endurance training;Patient/family training;Equipment eval/education;Bed mobility;Gait training   Progress Discontinue PT   PT Therapy Minutes   PT Time In 0857   PT Time Out 0942   PT Total Time (minutes) 45   PT Mode of treatment - Individual (minutes) 45   PT Mode of treatment - Concurrent (minutes) 0   PT Mode of treatment - Group (minutes) 0   PT Mode of treatment - Co-treat (minutes) 0   PT Mode of Treatment - Total time(minutes) 45 minutes   PT Cumulative Minutes 939        08/01/24 0857   Pain Assessment   Pain Assessment Tool 0-10   Pain Score 4   Restrictions/Precautions   Precautions Fall Risk;Pain   RLE Weight Bearing Per Order WBAT   ROM Restrictions No   Cognition   Overall Cognitive Status WFL   Subjective   Subjective I'm feeling good, no concerns   Roll Left and Right   Type of Assistance Needed Independent   Physical Assistance Level No physical assistance   Roll Left and Right CARE Score 6   Sit to Lying   Type  of Assistance Needed Independent   Physical Assistance Level No physical assistance   Sit to Lying CARE Score 6   Lying to Sitting on Side of Bed   Type of Assistance Needed Independent   Physical Assistance Level No physical assistance   Lying to Sitting on Side of Bed CARE Score 6   Sit to Stand   Type of Assistance Needed Independent   Physical Assistance Level No physical assistance   Sit to Stand CARE Score 6   Bed-Chair Transfer   Type of Assistance Needed Independent   Physical Assistance Level No physical assistance   Chair/Bed-to-Chair Transfer CARE Score 6   Car Transfer   Reason if not Attempted Environmental limitations   Car Transfer CARE Score 10   Walk 10 Feet   Type of Assistance Needed Independent   Physical Assistance Level No physical assistance   Comment RW   Walk 10 Feet CARE Score 6   Walk 50 Feet with Two Turns   Comment Deferred due to wanting to conserve energy   Walk 150 Feet   Comment Deferred by patient   Walking 10 Feet on Uneven Surfaces   Comment Deferred by patient   Ambulation   Primary Mode of Locomotion Prior to Admission Walk   Does the patient walk? 2. Yes   Wheel 50 Feet with Two Turns   Type of Assistance Needed Independent   Physical Assistance Level No physical assistance   Wheel 50 Feet with Two Turns CARE Score 6   Wheel 150 Feet   Type of Assistance Needed Independent   Physical Assistance Level No physical assistance   Wheel 150 Feet CARE Score 6   Wheelchair mobility   Does the patient use a wheelchair? 1. Yes   Type of Wheelchair Used 1. Manual   Therapeutic Interventions   Strengthening HEP reviewed for performance at home   Flexibility Review of hamstring stretch, heel cord stretch   Other Review of energy conservation techniques   Equipment Use   NuStep L1 10 mins   Assessment   Treatment Assessment Patient has made stedy progress with PT.  Planned discharge home today with home PT.  Pt independent wheelchair level and with short distances.  No concerns with  discharge and HEP in place.   Problem List Decreased strength;Decreased endurance;Impaired balance;Decreased mobility;Decreased skin integrity   PT Barriers   Physical Impairment Decreased strength;Decreased range of motion;Decreased endurance;Impaired balance;Decreased safety awareness;Impaired sensation;Orthopedic restrictions;Pain   Functional Limitation Walking;Transfers;Standing;Stair negotiation;Ramp negotiation;Car transfers   Plan   Treatment/Interventions Functional transfer training;LE strengthening/ROM;Elevations;Therapeutic exercise;Endurance training;Patient/family training;Equipment eval/education;Bed mobility;Gait training   Progress Discontinue PT   PT Therapy Minutes   PT Time In 0857   PT Time Out 0942   PT Total Time (minutes) 45   PT Mode of treatment - Individual (minutes) 45   PT Mode of treatment - Concurrent (minutes) 0   PT Mode of treatment - Group (minutes) 0   PT Mode of treatment - Co-treat (minutes) 0   PT Mode of Treatment - Total time(minutes) 45 minutes   PT Cumulative Minutes 939     Patient returned to bed for removal of stitches.    5xSTS: 12 seconds, RW in front for safety.

## 2024-08-01 NOTE — NURSING NOTE
Patient discharged to home with referrals for home health services.  Inventory of personal belongings completed with patient. Personal belongings take with patient at time of discharge. Discharge instructions reviewed with patient . He verbalized understanding of same. IV medications and supplies sent with patient at time of discharge.Transported home via personal vehicle which IDT deemed a safe mode of transportation. Escorted of unit and assisted into car without difficulty.  Patient expressed appreciation for cares and services received while on ARU

## 2024-08-01 NOTE — PHYSICAL THERAPY NOTE
PT DISCHARGE SUMMARY    Patient HAS met max benefit from inpatient ARC PT.  Patient MI Bed mobility; MI Transfers; MI Gait with RW for household distances AD on level and unlevel surfaces.  Stairs with S/CGA assist.  Patient continues to require cues for pacing of activity.  Will benefit from continued PT services post discharge.

## 2024-08-01 NOTE — CASE MANAGEMENT
Patient progressed well in rehab, discharged to home with wife. [facility] home care arranged fro RN/PT/OT, they will be at patient's home for 2:00 PM dose of IV antibiotic with education. [facility] infusion delivered

## 2024-08-01 NOTE — PROGRESS NOTES
08/01/24 0701   Pain Assessment   Pain Assessment Tool 0-10   Pain Score 4   Pain Location/Orientation Orientation: Right   Pain Onset/Description Onset: Ongoing   Oral Hygiene   Type of Assistance Needed Independent   Physical Assistance Level No physical assistance   Comment w/c level   Oral Hygiene CARE Score 6   Picking Up Object   Type of Assistance Needed Independent   Physical Assistance Level No physical assistance   Picking Up Object CARE Score 6   Sit to Lying   Type of Assistance Needed Independent   Physical Assistance Level No physical assistance   Sit to Lying CARE Score 6   Lying to Sitting on Side of Bed   Type of Assistance Needed Independent   Physical Assistance Level No physical assistance   Lying to Sitting on Side of Bed CARE Score 6   Sit to Stand   Type of Assistance Needed Independent   Physical Assistance Level No physical assistance   Sit to Stand CARE Score 6   Light Housekeeping   Light Housekeeping Level Wheelchair   Light Housekeeping Level of Assistance Modified independent   Light Housekeeping packed up items in prep for d/c to home today   Cognition   Overall Cognitive Status WFL   Arousal/Participation Alert;Cooperative   Attention Within functional limits   Orientation Level Oriented X4   Memory Within functional limits   Following Commands Follows all commands and directions without difficulty   Activity Tolerance   Activity Tolerance Patient tolerated treatment well   Assessment   Treatment Assessment Pt being d/cd to home today with supportive wife. Provided pt with HEP and reviewed same. Pt demon good safety with home mgmt tasks to prepare self for d/c to home today via w/c level. Pt organized items and assisted to pack items for d/c. MI for all functional txfs. All goals met. Plan is for d/c to home to home today.   Problem List Decreased strength;Decreased endurance;Impaired balance;Decreased mobility;Decreased skin integrity   Plan   Progress Discontinue OT   OT Therapy  Minutes   OT Time In 0701   OT Time Out 0731   OT Total Time (minutes) 30   OT Mode of treatment - Individual (minutes) 30   OT Mode of treatment - Concurrent (minutes) 0   OT Mode of treatment - Group (minutes) 0   OT Mode of treatment - Co-treat (minutes) 0   OT Mode of Treatment - Total time(minutes) 30 minutes   OT Cumulative Minutes 485   Therapy Time missed   Time missed? No

## 2024-08-01 NOTE — DISCHARGE SUMMARY
Discharge Summary   Aaron Richards 59 y.o. male MRN: [id number]  Unit/Bed#: -01 Encounter: [id number]    Admission Date: 7/21/2024     Discharge Date: 8/1/2024    Rehab Diagnosis: Orthopedic Disorders:  08.61  Unilateral Knee Replacement  Etiologic Diagnosis:   s/p R antibiotic cement TKA with Dr. 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 year old male who is admitted to [facility].  Patient originally presented to [facility] on 7/15 admitted under orthopedic surgery service with right knee septic arthritis. Patient previously seen in outpatient office with signs and symptoms of right knee post-traumatic osteoarthritis with right tibial plateau non-union ORIF.  Patient tried and failed conservative treatments and elected for surgical intervention.  S/p R antibiotic cement TKA with Dr. 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  ID consulted for antibiotic management.  Continue daptomycin 6 mg/kg IV every 24 hours through 8/26/2024 to complete 6 weeks total treatment.  PICC line placed on 7/19.  PT and OT consulted and recommend inpatient acute rehab services.     The medical team was managing the following medical conditions during the course of the patient's admission:   Cardiac hx w/ HTN, HLD, PAD, orthostatic hypotension.  Continue ASA 81 mg twice daily (for 4 weeks for ortho DVT ppx, then back to daily) and atorvastatin 40 mg daily.  Home:  irbesartan/hydrochlorothiazide 150-12.5 mg daily.  Discontinued all BP medication due to orthostasis.  Abdominal binder.  Trial saltines prior to shower.   DM2.  Hgb A1c 6.2%.  Continue Lantus 25 units twice daily.  SSI w/ POCT AS HS.  Home: Lantus 30 units BID and Apidra SSI.   Acquired hypothyroidism.  Hx thyroid cancer s/p thyroidectomy.  Continue levothyroxine 175 mcg daily.   GERD.  Continue Protonix 40 mg daily.    CKD3b.  Stable.  Cr 0.65 on 7/22.  Avoid nephrotoxins.   MDD/anxiety.  Continue Lexapro 20 mg daily.   Hx of alcohol use.  Stable.  Sober since 2023.   Insomnia.  Continue Ambien 10 mg HS as needed.   Pain and bowel regimen.  As per physiatry.   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  Continue daptomycin 6 mg/kg IV every 24 hours through 8/26 then transitioned to oral doxycycline for additional 4.5 months.  Check CPK weekly.  Patient receiving intensive PT OT as per physiatry.      Physiatry Additions/Comorbidities:    Septic arthritis of knee (HCC)  Assessment & Plan  Prior traumatic HW failure requiring HW removal and then developed sinus tract extending into jt space with op finding c/w septic arthritis per last ID note  - S/p complicated right hand crafted antibiotic cement total knee arthroplasty with augmentation by ortho, Dr Hendrix on 7/15  Weight-bearing as tolerated on affected limb; ROM as tolerated, pillow/blankets under achilles not behind knee while in bed  Comgmt by IM during ARC course   Daptomycin IV through 8/26 for 6 week course - Monitor labs (CBC/BMP and CK weekly) while on Abx; remove PICC after IV abx.    *Next labs due 8/5/24 with results to [facility] ID - Dr. Hines.  Monitor incision/area for infection or dehiscence/impaired healing - decreased serosang drainage, no clear signs of infection again  generalized knee edema stable - pt feels it is improving   Recommend wound care consult - pending   In interim, Change dressing with DSD BID and PRN if soiled to avoid maceration - hopefully can decrease when improves.  Notify MD of increased drainage, pus, redness, or breakdown of incision   Patient was POD 14 on 7/29/24.  Ortho appt with Dr. Hendrix 8/12/24.  Spoke with Dr. Hendrix 7/30/24.  Ok to remove superior and inferior sutures and leave mid sutures in place   Monitor for signs/symptoms of VTE, atelectasis, acute blood loss anemia, or other infection   Function improving   Continue  acute comprehensive interdisciplinary inpatient rehabilitation to include intensive skilled therapies (PT, OT) as outlined with oversight and management by rehabilitation physician as well as inpatient rehab level nursing, case management and weekly interdisciplinary team meetings.   Follow-up with Ortho Sx after d/c and ortho if needed during ARC course      7/30/24             Acute Rehabilitation Center Course: Patient participated in a comprehensive interdisciplinary inpatient rehabilitation program which included involvment of Rehab MD, therapies (PT, OT, and/or SLP), RN, CM, SW, dietary, and psychology services.   Significant Findings, Care, Treatment and Services Provided: Acute comprehensive interdisciplinary inpatient rehabilitation including PT, OT, SLP, RN, CM, SW, dietary, psychology, etc.    PATIENT TO CONTINUE REHABILITATION WITH [facility] RN, PT, OT.    Functional Status Upon Admission to [facility]:  Mobility:  Transfers min assist   Ambulation/Mobility 10-25 ft min assist   ADLs:  AMPAC mod assist bathing, toileting hygiene, toilet transfers     Functional Status Upon Discharge from [facility]:   Physical Therapy Occupational Therapy Speech Therapy   Weight Bearing Status: Weight Bearing as Tolerated  Transfers: Independent  Bed Mobility: Independent  Amulation Distance (ft): 130 feet  Ambulation: Incidental Touching, Minimal Assistance  Assistive Device for Ambulation: Roller Walker  Wheelchair Mobility Distance: 300 ft  Wheelchair Mobility: Independent  Number of Stairs: 6  Assistive Device for Stairs: Right Hand Rail  Stair Assistance: Minimal Assistance  Ramp:  (TBA)  Assistive Device for Ramp:  (TBA)  Discharge Recommendations: Home with:  DC Home with:: Family Support, Home Physical Therapy   Eating: Independent  Grooming: Independent  Bathing: Supervision  Bathing: Supervision  Upper Body Dressing: Independent  Lower Body Dressing: Supervision  Toileting: Supervision  Tub/Shower Transfer:  Supervision  Toilet Transfer: Supervision  Cognition: Within Defined Limits  Orientation: Person, Place, Time, Situation                   Discharge Medications:   See after visit summary for reconciled discharge medications provided to patient and family.      Condition at Discharge: stable     Discharge instructions/Information to patient and family:   See after visit summary for information provided to patient and family.      Provisions for Follow-Up Care:  See after visit summary for information related to follow-up care and any pertinent home health orders.      Future Appointments   Date Time Provider Department Center   8/12/2024 12:45 PM DO HARESH Bashir [facility]   8/14/2024 12:15 PM Osiel Hines PA-C [facility] DIS BE Hospitalist   11/6/2024  1:00 PM [facility] 1 [facility]       Disposition: Home    Planned Readmission: No    Discharge Statement   I spent 45 minutes discharging the patient. This time was spent on the day of discharge. I had direct contact with the patient on the day of discharge. Greater than 50% of the total time was spent examining patient, answering all patient questions, arranging and discussing plan of care with patient as well as directly providing post-discharge instructions. Additional time then spent on discharge activities.    Discharge Medications:  See after visit summary for reconciled discharge medications provided to patient and family.      Facility Administered Medications Prior to Discharge:    Current Facility-Administered Medications   Medication Dose Route Frequency Provider Last Rate    acetaminophen  975 mg Oral Q8H PRN Laxmi Marks MD      ascorbic acid  500 mg Oral BID Jessie Bailey PA-C      aspirin  81 mg Oral BID Jessie Bailey PA-C      atorvastatin  40 mg Oral Daily With Dinner Jessie Bailey PA-C      DAPTOmycin  6 mg/kg (Adjusted) Intravenous Q24H Laxmi Marks  mg (07/31/24 1350)     docusate sodium  100 mg Oral TID PRN Laxmi Marks MD      escitalopram  5 mg Oral Daily Jessie Bailey PA-C      HYDROmorphone  2 mg Oral Q4H PRN Laxmi Marks MD      insulin glargine  25 Units Subcutaneous Q12H TESS Jessie Bailey PA-C      insulin lispro  1-6 Units Subcutaneous TID AC Jessie Bailey PA-C      levothyroxine  175 mcg Oral Early Morning Jessie Bailey PA-C      ondansetron  4 mg Oral Q6H PRN NAILA Ruiz      pantoprazole  40 mg Oral Every Other Day Jessie Bailey PA-C      senna  1 tablet Oral BID PRN Meir Prather MD      zolpidem  10 mg Oral HS PRN Jessie Bailey PA-C

## 2024-08-05 LAB
FUNGUS SPEC CULT: NORMAL

## 2024-08-12 ENCOUNTER — APPOINTMENT (OUTPATIENT)
Dept: RADIOLOGY | Facility: MEDICAL CENTER | Age: 59
End: 2024-08-12
Payer: MEDICARE

## 2024-08-12 ENCOUNTER — OFFICE VISIT (OUTPATIENT)
Dept: OBGYN CLINIC | Facility: MEDICAL CENTER | Age: 59
End: 2024-08-12

## 2024-08-12 VITALS
BODY MASS INDEX: 29.35 KG/M2 | DIASTOLIC BLOOD PRESSURE: 73 MMHG | HEIGHT: 76 IN | SYSTOLIC BLOOD PRESSURE: 111 MMHG | HEART RATE: 77 BPM | WEIGHT: 241 LBS

## 2024-08-12 DIAGNOSIS — Z47.1 AFTERCARE FOLLOWING RIGHT KNEE JOINT REPLACEMENT SURGERY: Primary | ICD-10-CM

## 2024-08-12 DIAGNOSIS — Z96.651 AFTERCARE FOLLOWING RIGHT KNEE JOINT REPLACEMENT SURGERY: Primary | ICD-10-CM

## 2024-08-12 DIAGNOSIS — Z96.651 AFTERCARE FOLLOWING RIGHT KNEE JOINT REPLACEMENT SURGERY: ICD-10-CM

## 2024-08-12 DIAGNOSIS — Z47.1 AFTERCARE FOLLOWING RIGHT KNEE JOINT REPLACEMENT SURGERY: ICD-10-CM

## 2024-08-12 LAB
ANION GAP SERPL CALCULATED.3IONS-SCNC: 10 MMOL/L (ref 3–11)
BASOPHILS # BLD AUTO: 0.1 THOU/CMM (ref 0–0.1)
BASOPHILS NFR BLD AUTO: 1 %
BUN SERPL-MCNC: 28 MG/DL (ref 7–28)
CALCIUM SERPL-MCNC: 9.2 MG/DL (ref 8.5–10.1)
CHLORIDE SERPL-SCNC: 106 MMOL/L (ref 100–109)
CO2 SERPL-SCNC: 21 MMOL/L (ref 21–31)
CREAT SERPL-MCNC: 0.8 MG/DL (ref 0.53–1.3)
CYTOLOGY CMNT CVX/VAG CYTO-IMP: ABNORMAL
DIFFERENTIAL METHOD BLD: ABNORMAL
EOSINOPHIL # BLD AUTO: 1.2 THOU/CMM (ref 0–0.5)
EOSINOPHIL NFR BLD AUTO: 12 %
ERYTHROCYTE [DISTWIDTH] IN BLOOD BY AUTOMATED COUNT: 14.5 % (ref 12–16)
FUNGUS SPEC CULT: NORMAL
GFR/BSA.PRED SERPLBLD CYS-BASED-ARV: 100 ML/MIN/{1.73_M2}
GLUCOSE SERPL-MCNC: 173 MG/DL (ref 65–99)
HCT VFR BLD AUTO: 34.5 % (ref 37–48)
HGB BLD-MCNC: 11.6 G/DL (ref 12.5–17)
LYMPHOCYTES # BLD AUTO: 2.2 THOU/CMM (ref 1–3)
LYMPHOCYTES NFR BLD AUTO: 23 %
MCH RBC QN AUTO: 30.2 PG (ref 27–36)
MCHC RBC AUTO-ENTMCNC: 33.6 G/DL (ref 32–37)
MCV RBC AUTO: 90 FL (ref 80–100)
MONOCYTES # BLD AUTO: 1 THOU/CMM (ref 0.3–1)
MONOCYTES NFR BLD AUTO: 11 %
NEUTROPHILS # BLD AUTO: 5 THOU/CMM (ref 1.8–7.8)
NEUTROPHILS NFR BLD AUTO: 53 %
PLATELET # BLD AUTO: 324 THOU/CMM (ref 140–350)
PMV BLD REES-ECKER: 9.9 FL (ref 7.5–11.3)
POTASSIUM SERPL-SCNC: 4.4 MMOL/L (ref 3.5–5.2)
RBC # BLD AUTO: 3.84 MILL/CMM (ref 4–5.4)
SODIUM SERPL-SCNC: 137 MMOL/L (ref 135–145)
WBC # BLD AUTO: 9.4 THOU/CMM (ref 4–10.5)

## 2024-08-12 PROCEDURE — 73562 X-RAY EXAM OF KNEE 3: CPT

## 2024-08-12 PROCEDURE — 99024 POSTOP FOLLOW-UP VISIT: CPT | Performed by: STUDENT IN AN ORGANIZED HEALTH CARE EDUCATION/TRAINING PROGRAM

## 2024-08-12 NOTE — PROGRESS NOTES
Subjective: Patient seen and examined. Pain controlled, he has very little pain at this time.  He has been able to ambulate on his knee while at home, but is in a wheelchair today due to the distance. This is the first he has been ambulating in the last 18 months. Progressing very well. Incision without drainage, majority of sutures were removed at acute care rehab.   He is home from rehab now. Denies fevers or chills.  He has been getting his IV abx via his PICC (daptomycin).     Physical Exam:  Incision: CDI, no erythema or drainage.  Remaining sutures present.  ROM: 5-110  5/5 IP/Q/HS/TA/GS, 2+ DP/PT, SILT DP/SP/S/S/TN    XR Right knee: cemented all poly tibia with medial tibia augment, CR femur with posterior augments. No fracture or dislocation. Vascular stent unchanged.     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    - continue multi-modal pain control   - Weight bearing status: WBAT RLE  - DVT ppx: aspirin 81mg twice daily x 4 weeks, complete.  - He is currently on IV abx (daptomycin) via ID, on 8/26/24 he completes his IV abx and his PICC will be dc.  - Incision care: remaining sutures removed.  May shower.  - PT/OT  - F/U 4-6 weeks.       Scribe Attestation      I,:  Dionne Kuhn PA-C am acting as a scribe while in the presence of the attending physician.:       I,:  Jorge Hendrix DO personally performed the services described in this documentation    as scribed in my presence.:

## 2024-08-13 ENCOUNTER — TELEPHONE (OUTPATIENT)
Dept: INFECTIOUS DISEASES | Facility: CLINIC | Age: 59
End: 2024-08-13

## 2024-08-13 NOTE — TELEPHONE ENCOUNTER
Tried to call patient today. Unable to reach as received message that number is no longer in service.     Called and left message for patients spouse Dottie today.   Informed Dottie to call the office back,   Was calling to confirm patients virtual appointment and how we will be connecting for the visit.

## 2024-08-14 ENCOUNTER — TELEMEDICINE (OUTPATIENT)
Dept: INFECTIOUS DISEASES | Facility: CLINIC | Age: 59
End: 2024-08-14
Payer: MEDICARE

## 2024-08-14 ENCOUNTER — TELEPHONE (OUTPATIENT)
Dept: INFECTIOUS DISEASES | Facility: CLINIC | Age: 59
End: 2024-08-14

## 2024-08-14 VITALS
HEIGHT: 76 IN | TEMPERATURE: 97.8 F | DIASTOLIC BLOOD PRESSURE: 73 MMHG | SYSTOLIC BLOOD PRESSURE: 111 MMHG | WEIGHT: 241 LBS | BODY MASS INDEX: 29.35 KG/M2

## 2024-08-14 DIAGNOSIS — K08.9 POOR DENTITION: ICD-10-CM

## 2024-08-14 DIAGNOSIS — E11.51 TYPE 2 DIABETES MELLITUS WITH DIABETIC PERIPHERAL ANGIOPATHY WITHOUT GANGRENE, WITH LONG-TERM CURRENT USE OF INSULIN (HCC): ICD-10-CM

## 2024-08-14 DIAGNOSIS — N18.32 STAGE 3B CHRONIC KIDNEY DISEASE (HCC): ICD-10-CM

## 2024-08-14 DIAGNOSIS — Z79.4 TYPE 2 DIABETES MELLITUS WITH DIABETIC PERIPHERAL ANGIOPATHY WITHOUT GANGRENE, WITH LONG-TERM CURRENT USE OF INSULIN (HCC): ICD-10-CM

## 2024-08-14 DIAGNOSIS — M00.9 PYOGENIC ARTHRITIS OF RIGHT KNEE JOINT, DUE TO UNSPECIFIED ORGANISM (HCC): Primary | ICD-10-CM

## 2024-08-14 LAB — CK SERPL-CCNC: 184 U/L (ref 30–223)

## 2024-08-14 PROCEDURE — 99214 OFFICE O/P EST MOD 30 MIN: CPT | Performed by: PHYSICIAN ASSISTANT

## 2024-08-14 NOTE — PROGRESS NOTES
Ambulatory Visit  Name: Aaron Richards      : 1965      MRN: [id number]  Encounter Provider: Osiel Hines PA-C  Encounter Date: 2024   Encounter department: [facility]    Assessment & Plan   1. Pyogenic arthritis of right knee joint, due to unspecified organism ([facility])  Assessment & Plan:  Right knee septic arthritis. In the setting of a previous traumatic injury with hardware failure requiring hardware removal and now has the development of a sinus tract extending into the joint space with operative findings consistent with a septic arthritis. Although previous cultures have been negative they were obtained while he has been on antibiotics. He is now status post I&D, and placement of a spacer on 7/15/2024. Patient discharged to complete a 6 week course of IV daptomycin.  Cultures were held an additional 14 days and remain negative to date.      Confirmed with Dr Hendrix the plan for the spacer is to remain in place as long an possible.  Therefore will plan to transition patient to oral doxycycline for an additional 4.5 months after he completes his IV daptomycin.     Patient continues to do well on the daptomycin.  Cultures reviewed and remain negative to date.  Labs stable.  BUT CK not drawn - Visiting Nurse did not see the order.  They will send someone out today or tomorrow to draw.        Plan  - continue daptomycin 500  mg po daily x 6 weeks through 24  - once he completes the daptomycin will transition to doxcycline for an additional 4.5 months  - weekly CBCD, CMP, CK while he is on the daptomycin  - continue follow up with ortho  - RTO 24 - can be virtual  2. Poor dentition  Assessment & Plan:  Patient with new hardware in his knee.  -Recommend OMFS evaluation for possible extractions  3. Type 2 diabetes mellitus with diabetic peripheral angiopathy without gangrene, with long-term current use of insulin ([facility])  Assessment & Plan:    Lab Results  "  Component Value Date    HGBA1C 6.2 (H) 07/01/2024     4. Stage 3b chronic kidney disease (HCC)  Assessment & Plan:  Continue to monitor cr while on antibiotics      History of Present Illness     Aaron Richards is a 59 y.o. male who presents for virtual follow up regarding R knee septic arthritis.  Patient is now at home.  He remains on the IV daptomycin.  He has no new complaints.  PICC line working well.  He denies missing any doses of the antibiotic.  He followed up with Ortho earlier this week.     Review of Systems   Constitutional:  Negative for chills and fever.   Respiratory:  Negative for cough and shortness of breath.    Gastrointestinal:  Negative for abdominal pain, diarrhea, nausea and vomiting.   Skin:  Negative for rash.   Psychiatric/Behavioral:  Negative for behavioral problems and confusion.        Objective     /73   Temp 97.8 °F (36.6 °C)   Ht 6' 4\" (1.93 m)   Wt 109 kg (241 lb)   BMI 29.34 kg/m²     Physical Exam  Vitals reviewed.   Constitutional:       General: He is not in acute distress.     Appearance: Normal appearance. He is not ill-appearing, toxic-appearing or diaphoretic.   HENT:      Head: Normocephalic and atraumatic.   Eyes:      General: No scleral icterus.        Right eye: No discharge.         Left eye: No discharge.      Conjunctiva/sclera: Conjunctivae normal.   Pulmonary:      Effort: Pulmonary effort is normal. No respiratory distress.   Musculoskeletal:      Comments: R knee incision healing well   Skin:     Coloration: Skin is not jaundiced or pale.      Findings: No erythema or rash.      Comments: PICC insertion site without erythema or drainage   Neurological:      Mental Status: He is alert and oriented to person, place, and time.   Psychiatric:         Mood and Affect: Mood normal.         Behavior: Behavior normal.       Administrative Statements         Labs;   8/12/24  Wbc: 9.4  Hgb: 11.6  Plt: 324  Cr: 0.80  CK :  NOT DRAWN    Telemedicine " consent    Patient: Aaron Richards  Provider: Osiel Hines PA-C  Provider located at [facility]  [address] [address] [phone]    The patient was identified by name and date of birth. Aaron Richards was informed that this is a telemedicine visit and that the visit is being conducted through the [employer] platform. He agrees to proceed..  My office door was closed. No one else was in the room.  He acknowledged consent and understanding of privacy and security of the video platform. The patient has agreed to participate and understands they can discontinue the visit at any time.    Patient is aware this is a billable service.     I spent 10 minutes with the patient during this visit. Additional time spent on chart/lab review, documentation, and order placement.

## 2024-08-14 NOTE — TELEPHONE ENCOUNTER
Called [facility] and spoke it BONI Rincon today.   Informed Mckenzie I was calling as patient weekly CBCD and BMP results were received but there was no CK resulted. Mckenzie states a CK was not drawn this week. Mckenzie states someone will go out righter today or she will go out tomorrow to collect CK.    show

## 2024-08-14 NOTE — ASSESSMENT & PLAN NOTE
Right knee septic arthritis. In the setting of a previous traumatic injury with hardware failure requiring hardware removal and now has the development of a sinus tract extending into the joint space with operative findings consistent with a septic arthritis. Although previous cultures have been negative they were obtained while he has been on antibiotics. He is now status post I&D, and placement of a spacer on 7/15/2024. Patient discharged to complete a 6 week course of IV daptomycin.  Cultures were held an additional 14 days and remain negative to date.      Confirmed with Dr Hendrix the plan for the spacer is to remain in place as long an possible.  Therefore will plan to transition patient to oral doxycycline for an additional 4.5 months after he completes his IV daptomycin.     Patient continues to do well on the daptomycin.  Cultures reviewed and remain negative to date.  Labs stable.  BUT CK not drawn - Visiting Nurse did not see the order.  They will send someone out today or tomorrow to draw.        Plan  - continue daptomycin 500  mg po daily x 6 weeks through 8/26/24  - once he completes the daptomycin will transition to doxcycline for an additional 4.5 months  - weekly CBCD, CMP, CK while he is on the daptomycin  - continue follow up with ortho  - RTO 8/26/24 - can be virtual

## 2024-08-19 LAB
FUNGUS SPEC CULT: NORMAL

## 2024-08-23 ENCOUNTER — TELEPHONE (OUTPATIENT)
Dept: NEPHROLOGY | Facility: CLINIC | Age: 59
End: 2024-08-23

## 2024-08-23 ENCOUNTER — TELEPHONE (OUTPATIENT)
Dept: INFECTIOUS DISEASES | Facility: CLINIC | Age: 59
End: 2024-08-23

## 2024-08-23 NOTE — TELEPHONE ENCOUNTER
EVANGELISTA. Contacted patient, d/t schedule change need to do virtual Monday at 0915. Requested a cb to confirm.

## 2024-08-23 NOTE — TELEPHONE ENCOUNTER
Called to confirm patients appointment with Osiel at 915 on 8/26. Patient can not do that time and told me his message said appointment was 1245. I advised patient I would have to talk to Osiel to see if we can do the appointment at a later time Monday.

## 2024-08-26 ENCOUNTER — TELEMEDICINE (OUTPATIENT)
Dept: INFECTIOUS DISEASES | Facility: CLINIC | Age: 59
End: 2024-08-26
Payer: MEDICARE

## 2024-08-26 DIAGNOSIS — E11.9 TYPE 2 DIABETES MELLITUS WITHOUT COMPLICATION, WITH LONG-TERM CURRENT USE OF INSULIN (HCC): ICD-10-CM

## 2024-08-26 DIAGNOSIS — M00.9 PYOGENIC ARTHRITIS OF RIGHT KNEE JOINT, DUE TO UNSPECIFIED ORGANISM (HCC): Primary | ICD-10-CM

## 2024-08-26 DIAGNOSIS — K08.9 POOR DENTITION: ICD-10-CM

## 2024-08-26 DIAGNOSIS — Z79.4 TYPE 2 DIABETES MELLITUS WITHOUT COMPLICATION, WITH LONG-TERM CURRENT USE OF INSULIN (HCC): ICD-10-CM

## 2024-08-26 PROCEDURE — 99214 OFFICE O/P EST MOD 30 MIN: CPT | Performed by: PHYSICIAN ASSISTANT

## 2024-08-26 RX ORDER — DOXYCYCLINE 100 MG/1
100 CAPSULE ORAL EVERY 12 HOURS SCHEDULED
Qty: 60 CAPSULE | Refills: 0 | Status: SHIPPED | OUTPATIENT
Start: 2024-08-26 | End: 2024-09-25

## 2024-08-26 NOTE — PROGRESS NOTES
Ambulatory Visit  Name: Aaron Richards      : 1965      MRN: [id number]  Encounter Provider: Osiel Hines PA-C  Encounter Date: 2024   Encounter department: [facility]    Assessment & Plan   1. Pyogenic arthritis of right knee joint, due to unspecified organism (HCC)  Assessment & Plan:  Right knee septic arthritis. In the setting of a previous traumatic injury with hardware failure requiring hardware removal and now has the development of a sinus tract extending into the joint space with operative findings consistent with a septic arthritis. Although previous cultures have been negative they were obtained while he has been on antibiotics. He is now status post I&D, and placement of a spacer on 7/15/2024. Patient discharged to complete a 6 week course of IV daptomycin.  Cultures were held an additional 14 days and remain negative to date.      Confirmed with Dr Hendrix the plan for the spacer is to remain in place as long an possible.  Therefore will plan to transition patient to oral doxycycline for an additional 4.5 months after he completes his IV daptomycin.      Patient continues to do well on the daptomycin.  Labs stable.  He finished his last dose today and PICC line has been removed.       Plan  -  transition to doxcycline 100 mg po bid for an additional 4.5 months  - weekly CBCD, CMP in 4 weeks  - continue follow up with ortho  - RTO 4 weeks  Orders:  -     doxycycline hyclate (VIBRAMYCIN) 100 mg capsule; Take 1 capsule (100 mg total) by mouth every 12 (twelve) hours  -     CBC and differential; Future; Expected date: 2024  -     Comprehensive metabolic panel; Future; Expected date: 2024  2. Poor dentition  Assessment & Plan:  Patient with new hardware in his knee.  -Recommend OMFS evaluation for possible extractions  3. Type 2 diabetes mellitus without complication, with long-term current use of insulin ([facility])  Assessment & Plan:    Lab Results    Component Value Date    HGBA1C 6.2 (H) 07/01/2024         History of Present Illness     Aaron Richards is a 59 y.o. male who presents for virtual follow up today regarding R knee septic arthritis.  Patient finished his last dose of daptomycin today and his PICC line has already been removed.  He has no new complaints.  He tolerated the daptomycin without issues.     Review of Systems   Constitutional:  Negative for chills and fever.   Respiratory:  Negative for cough and shortness of breath.    Gastrointestinal:  Negative for abdominal pain, diarrhea, nausea and vomiting.   Skin:  Negative for rash.   Psychiatric/Behavioral:  Negative for behavioral problems and confusion.        Objective     There were no vitals taken for this visit.    Physical Exam  Constitutional:       General: He is not in acute distress.     Appearance: Normal appearance. He is not ill-appearing, toxic-appearing or diaphoretic.   HENT:      Head: Normocephalic and atraumatic.   Eyes:      General: No scleral icterus.        Right eye: No discharge.         Left eye: No discharge.      Conjunctiva/sclera: Conjunctivae normal.   Pulmonary:      Effort: Pulmonary effort is normal. No respiratory distress.   Musculoskeletal:      Comments: R knee brace in place.    Skin:     Coloration: Skin is not jaundiced or pale.      Findings: No erythema or rash.   Neurological:      Mental Status: He is alert and oriented to person, place, and time.   Psychiatric:         Mood and Affect: Mood normal.         Behavior: Behavior normal.       Administrative Statements       Labs:   8/19/24  Cr: 0.67  CK: 170  Wbc: 8.6  Hgb: 12.0  Plt: 239    Telemedicine consent    Patient: Aaron Richards  Provider: Osiel Hines PA-C  Provider located at [facility]  [address] [place] [address]  [phone]    The patient was identified by name and date of birth. Aaron Richards was informed that this  is a telemedicine visit and that the visit is being conducted through the [employer] platform. He agrees to proceed..  My office door was closed. No one else was in the room.  He acknowledged consent and understanding of privacy and security of the video platform. The patient has agreed to participate and understands they can discontinue the visit at any time.    Patient is aware this is a billable service.     I spent 10 minutes with the patient during this visit. Additional time spent on chart/lab review, documentation and order placement.

## 2024-08-26 NOTE — PATIENT INSTRUCTIONS
- start doxycycline 100 mg po bid for 4.5 months  - while on doxycycline avoid prolonged sun exposure  - sit upright for 30-45 minutes after taking the doxycycline  - avoid dairy/vitamins/supplements for 1-2 hours before and after the doxycycline.   - Labs prior to next visit  - RTO 4 weeks

## 2024-08-26 NOTE — ASSESSMENT & PLAN NOTE
Right knee septic arthritis. In the setting of a previous traumatic injury with hardware failure requiring hardware removal and now has the development of a sinus tract extending into the joint space with operative findings consistent with a septic arthritis. Although previous cultures have been negative they were obtained while he has been on antibiotics. He is now status post I&D, and placement of a spacer on 7/15/2024. Patient discharged to complete a 6 week course of IV daptomycin.  Cultures were held an additional 14 days and remain negative to date.      Confirmed with Dr Hendrix the plan for the spacer is to remain in place as long an possible.  Therefore will plan to transition patient to oral doxycycline for an additional 4.5 months after he completes his IV daptomycin.      Patient continues to do well on the daptomycin.  Labs stable.  He finished his last dose today and PICC line has been removed.       Plan  -  transition to doxcycline 100 mg po bid for an additional 4.5 months  - weekly CBCD, CMP in 4 weeks  - continue follow up with ortho  - RTO 4 weeks

## 2024-08-28 PROBLEM — K52.9 GASTROENTERITIS: Status: RESOLVED | Noted: 2024-07-29 | Resolved: 2024-08-28

## 2024-09-03 ENCOUNTER — EVALUATION (OUTPATIENT)
Dept: PHYSICAL THERAPY | Facility: CLINIC | Age: 59
End: 2024-09-03
Payer: MEDICARE

## 2024-09-03 DIAGNOSIS — Z47.1 AFTERCARE FOLLOWING RIGHT KNEE JOINT REPLACEMENT SURGERY: Primary | ICD-10-CM

## 2024-09-03 DIAGNOSIS — Z96.651 AFTERCARE FOLLOWING RIGHT KNEE JOINT REPLACEMENT SURGERY: Primary | ICD-10-CM

## 2024-09-03 PROCEDURE — 97110 THERAPEUTIC EXERCISES: CPT

## 2024-09-03 PROCEDURE — 97162 PT EVAL MOD COMPLEX 30 MIN: CPT

## 2024-09-03 NOTE — PROGRESS NOTES
PT Evaluation     Today's date: 9/3/2024  Patient name: Aaron Richards  : 1965  MRN: [id number]  Referring provider: Dionne Kuhn PA-C  Dx:   Encounter Diagnosis     ICD-10-CM    1. Aftercare following right knee joint replacement surgery  Z47.1 Ambulatory Referral to Physical Therapy    Z96.651                      Assessment  Impairments: abnormal gait, abnormal or restricted ROM, activity intolerance, impaired balance, impaired physical strength, lacks appropriate home exercise program, pain with function, weight-bearing intolerance and poor posture   Symptom irritability: moderate    Assessment details: Aaron Richards is a 59 y.o. male presenting to physical therapy for an initial evaluation s/p right hand crafted antibiotic cement total knee arthroplasty on 7/15/2024. 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 He later developed septic arthritis and had a PICC line placed for IV antibiotics. This was recently removed, however he remains on oral antibiotics for 4 months. The patient demonstrates largely WFL knee ROM and good quadriceps activation. However, demonstrates significant muscle atrophy in bilateral lower extremities and muscular endurance deficits for walking as he was largely nonambulatory for 2 years. He remains limited to household distances for ambulation with his RW due to fatigue. Primary means of mobility is his W/C at this time for community distances. He is a fall risk per his scores on the TUG and 5 STS. These deficits have limited the patient's ability to complete ADLs, avocational responsibilities, and recreational activities. This patient would benefit from OP PT services to address their impairments and functional limitations to maximize functional mobility.  Understanding of Dx/Px/POC: excellent     Prognosis: good    Goals  STG to be achieved in 4 weeks:   The patient will improve  TUG and 5 STS by at least 10 seconds demonstrating improved strength and functional mobility.   The patient will increase bilateral knee extension MMT score to at least 4-/5 to allow for improved functional mobility.   The patient will increase bilateral hip abduction MMT score to at least 4-/5 to allow for improved functional mobility.     LTG to be achieved by DC:   The patient will be independent in comprehensive HEP.   The patient will increase bilateral hip and knee MMT score to WFL to allow for improved functional mobility.   The patient will be able to ambulate community level distances with LRAD to allow for improved community navigation.   The patient will be able to complete TUG and 5 STS in <12 seconds demonstrating improved functional mobility and decreased risk for falls.       Plan  Patient would benefit from: skilled physical therapy  Planned modality interventions: thermotherapy: hydrocollator packs, TENS and cryotherapy    Planned therapy interventions: manual therapy, joint mobilization, balance/weight bearing training, neuromuscular re-education, patient education, flexibility, strengthening, stretching, therapeutic activities, therapeutic exercise, gait training, home exercise program and abdominal trunk stabilization    Frequency: 2x week  Duration in weeks: 12  Plan of Care beginning date: 9/3/2024  Plan of Care expiration date: 11/26/2024  Treatment plan discussed with: patient        Subjective Evaluation    History of Present Illness  Mechanism of injury: Ismael reports that back in January of 2023 he fell down a flight of steps and fractured his tibia. He was placed in a rehab center, where unfortunately he developed a second fracture in the leg. He underwent a right tibial plateau ORIF on 5/1/23. 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 He later developed septic arthritis and most recently underwent right hand crafted antibiotic cement total knee arthroplasty  on 7/15/2024. He was DC from the hospital to [facility] and received PT and OT for approximately 10 days. He had brief home health PT for 2-3 weeks. He did have a PICC line placed where he received IV antibiotics, however this was just removed approximately 2 weeks ago. He is now on oral antibiotics for 4 months.      He reports significant deficits in his endurance for walking and atrophy due to not ambulating for almost 2 years. He says that his muscles are quite atrophied as well due to this. He purchased an OTC ankle brace which he wears on his right ankle for support. He was told that his left foot has drop foot when in the rehab center. He also had troubles with Orthostatic Hypotension when at the rehab center following this most recent surgery. He is only ambulatory periodically around his home with a RW, but due to his levels of fatigue he primarily uses a wheelchair.       He lives in a [place] style home which has 3 steps to enter his home. There is a right ascending hand rail to enter the home which he completes non reciprocally. He transfers onto a shower bench for all showers at this time. There are grab bars on an the elevated toilet at home which he uses to help stand him to his RW.   Patient Goals  Patient goals for therapy: improved balance, increased motion, increased strength and independence with ADLs/IADLs  Patient goal: to build up endurance for walking, improve muscle strength  Pain  Current pain ratin  At best pain ratin  At worst pain ratin  Location: right knee  Quality: dull ache  Relieving factors: change in position  Aggravating factors: standing, walking and stair climbing  Progression: improved    Social Support  Steps to enter house: yes  Stairs in house: no   Lives in: one-story house  Lives with: spouse    Treatments  Previous treatment: medication, physical therapy and home therapy  Current treatment: physical therapy  Discharged from (in last 30 days): home health  care        Objective     Observations     Additional Observation Details  Multiple incisions noted on R knee. Patellar stabilizing brace donned on R knee.     Active Range of Motion   Left Knee   Flexion: 135 degrees   Extension: 0 degrees     Right Knee   Flexion: 115 degrees   Extension: 0 degrees     Strength/Myotome Testing     Left Hip   Planes of Motion   Flexion: 4-  Abduction: 3+    Right Hip   Planes of Motion   Flexion: 3+  Abduction: 3    Left Knee   Flexion: 4  Extension: 4  Quadriceps contraction: good    Right Knee   Flexion: 3  Extension: 3  Quadriceps contraction: good    Left Ankle/Foot   Dorsiflexion: 1  Plantar flexion: 2    Right Ankle/Foot   Dorsiflexion: 0    Ambulation   Weight-Bearing Status   Weight-Bearing Status (Right): weight-bearing as tolerated    Assistive device used: two-wheeled walker and none    Observational Gait   Gait: antalgic     Additional Observational Gait Details  Step to gait pattern with RW and W/C follow     Functional Assessment        Comments  Initial Evaluation 9/3/24:  5 STS- 26.61 seconds: standing to RW from W/C  TUG- 46.96 seconds: step through gait pattern with RW and W/C follow              Precautions: Hx Thyroid CA, HTN, DM, Transmetatarsal Amp. on R foot 2020, Drop foot on L, Orthostatic Hypotension.  s/p right hand crafted antibiotic cement total knee arthroplasty 7/15/2024    POC expires Unit limit Auth  expiration date PT/OT + Visit Limit?   11/26/24 N/A Pending Prim-BOMN      Sec- 60 per cly           Visit/Unit Tracking  AUTH Status:  Date 9/3              Pending Used 1               Remaining                   Manuals 9/3            BP monitoring  102/74 mmHg                                                   Neuro Re-Ed                                                                                                        Ther Ex             Nustep with UE for muscular endurance             LAQs             Seated hamstring curls with TB              Standing hip 3 ways              Supine SLR B/L             Sidelying SLR B/L             Bridges              Clamshells on side B/L             HR standing                          Pt education PT POC            Ther Activity             Mini squats                           Gait Training             Ambulation with RW and W/C follow                          Modalities

## 2024-09-05 ENCOUNTER — OFFICE VISIT (OUTPATIENT)
Dept: PHYSICAL THERAPY | Facility: CLINIC | Age: 59
End: 2024-09-05
Payer: MEDICARE

## 2024-09-05 DIAGNOSIS — Z96.651 AFTERCARE FOLLOWING RIGHT KNEE JOINT REPLACEMENT SURGERY: Primary | ICD-10-CM

## 2024-09-05 DIAGNOSIS — Z47.1 AFTERCARE FOLLOWING RIGHT KNEE JOINT REPLACEMENT SURGERY: Primary | ICD-10-CM

## 2024-09-05 PROCEDURE — 97110 THERAPEUTIC EXERCISES: CPT

## 2024-09-05 NOTE — PROGRESS NOTES
"Daily Note     Today's date: 2024  Patient name: Aaron Richards  : 1965  MRN: [id number]  Referring provider: Dionne Kuhn PA-C  Dx:   Encounter Diagnosis     ICD-10-CM    1. Aftercare following right knee joint replacement surgery  Z47.1     Z96.651                      Subjective: Patient reports that he feels his right knee leans out to the side when he is standing on it. He isn't sure if this is normal or just because of his weakness.       Objective: See treatment diary below      Assessment: Tolerated treatment well. Patient with good quadriceps activation, however does lack strength to complete SLR without quad lag. Required moderate A needed to complete with good form. Did require use of W/C to leave clinic due to fatigue levels at conclusion of session.Provided with HEP; demonstrated understanding it's completion. Patient demonstrated fatigue post treatment, exhibited good technique with therapeutic exercises, and would benefit from continued PT      Plan: Continue per plan of care.      Precautions: FALL RISK, Hx Thyroid CA, HTN, DM, Transmetatarsal Amp. on R foot , Drop foot on L, Orthostatic Hypotension.  s/p right hand crafted antibiotic cement total knee arthroplasty 7/15/2024    POC expires Unit limit Auth  expiration date PT/OT + Visit Limit?   24 N/A 24 Prim-BOMN      Sec- 60 per cly           Visit/Unit Tracking  AUTH Status:  Date 9/3 9/5             Approved 12 Used 1 2              Remaining  11 10                Manuals 9/3 9/5           BP monitoring  102/74 mmHg Pre:  108/68 mmHg  Post:                                                   Neuro Re-Ed                                                                                                        Ther Ex             Nustep with UE for muscular endurance  Seat 14  UE 13  L3 10'           LAQs B/L  3\" 2# 2x10 ea Increase wt NV          Seated hamstring curls with TB  L LE black 2x10    R LE  GTB 2x10         " "  Supine SLR B/L  3x5 R LE mod A;  2x10 L LE           Sidelying SLR B/L             Bridges   3\"1x10  1x5           Clamshells on side B/L             HR seated  3\" 2x10                        Pt education PT POC            Ther Activity             Mini squats                           Gait Training             Ambulation with RW and W/C follow                          Modalities                                            "

## 2024-09-09 ENCOUNTER — TELEPHONE (OUTPATIENT)
Dept: OBGYN CLINIC | Facility: HOSPITAL | Age: 59
End: 2024-09-09

## 2024-09-09 ENCOUNTER — APPOINTMENT (OUTPATIENT)
Dept: PHYSICAL THERAPY | Facility: CLINIC | Age: 59
End: 2024-09-09
Payer: MEDICARE

## 2024-09-09 DIAGNOSIS — Z47.1 AFTERCARE FOLLOWING RIGHT KNEE JOINT REPLACEMENT SURGERY: Primary | ICD-10-CM

## 2024-09-09 DIAGNOSIS — Z96.651 AFTERCARE FOLLOWING RIGHT KNEE JOINT REPLACEMENT SURGERY: Primary | ICD-10-CM

## 2024-09-09 NOTE — TELEPHONE ENCOUNTER
Simeon PERALTA TKA /15    Pt is experiencing swelling and pain in the knee. Also states his knee is locking (first time locking 9/7)    Did also report a fall after PT on Thursday. States he did not land on the right side.     Would like to know if this is normal and if he should continue with PT     Please advise     Phone:   [phone]

## 2024-09-09 NOTE — PROGRESS NOTES
"Daily Note     Today's date: 2024  Patient name: Aaron Richards  : 1965  MRN: [id number]  Referring provider: Dionne Kuhn PA-C  Dx:   Encounter Diagnosis     ICD-10-CM    1. Aftercare following right knee joint replacement surgery  Z47.1     Z96.651                      Subjective: ***      Objective: See treatment diary below      Assessment: Tolerated treatment {Tolerated treatment :[id number]}. Patient {assessment:}      Plan: {PLAN:[id number]}     Precautions: FALL RISK, Hx Thyroid CA, HTN, DM, Transmetatarsal Amp. on R foot , Drop foot on L, Orthostatic Hypotension.  s/p right hand crafted antibiotic cement total knee arthroplasty 7/15/2024    POC expires Unit limit Auth  expiration date PT/OT + Visit Limit?   24 N/A 24 Prim-BOMN      Sec- 60 per cly           Visit/Unit Tracking  AUTH Status:  Date 9/3 9/5             Approved 12 Used 1 2              Remaining  11 10                Manuals 9/3 9/5           BP monitoring  102/74 mmHg Pre:  108/68 mmHg  Post:                                                   Neuro Re-Ed                                                                                                        Ther Ex             Nustep with UE for muscular endurance  Seat 14  UE 13  L3 10'           LAQs B/L  3\" 2# 2x10 ea Increase wt NV          Seated hamstring curls with TB  L LE black 2x10    R LE  GTB 2x10           Supine SLR B/L  3x5 R LE mod A;  2x10 L LE           Sidelying SLR B/L             Bridges   3\"1x10  1x5           Clamshells on side B/L             HR seated  3\" 2x10                        Pt education PT POC            Ther Activity             Mini squats                           Gait Training             Ambulation with RW and W/C follow                          Modalities                                              "

## 2024-09-09 NOTE — TELEPHONE ENCOUNTER
Apt scheduled- he asked if should go to PT- Awaiting info from efrem- ill call him back once she lets me know.

## 2024-09-11 ENCOUNTER — APPOINTMENT (OUTPATIENT)
Dept: PHYSICAL THERAPY | Facility: CLINIC | Age: 59
End: 2024-09-11
Payer: MEDICARE

## 2024-09-16 ENCOUNTER — APPOINTMENT (OUTPATIENT)
Dept: PHYSICAL THERAPY | Facility: CLINIC | Age: 59
End: 2024-09-16
Payer: MEDICARE

## 2024-09-17 ENCOUNTER — OFFICE VISIT (OUTPATIENT)
Age: 59
End: 2024-09-17

## 2024-09-17 ENCOUNTER — APPOINTMENT (EMERGENCY)
Dept: CT IMAGING | Facility: HOSPITAL | Age: 59
DRG: 467 | End: 2024-09-17
Payer: MEDICARE

## 2024-09-17 ENCOUNTER — HOSPITAL ENCOUNTER (INPATIENT)
Facility: HOSPITAL | Age: 59
LOS: 6 days | Discharge: HOME | DRG: 467 | End: 2024-09-23
Attending: STUDENT IN AN ORGANIZED HEALTH CARE EDUCATION/TRAINING PROGRAM | Admitting: STUDENT IN AN ORGANIZED HEALTH CARE EDUCATION/TRAINING PROGRAM
Payer: MEDICARE

## 2024-09-17 ENCOUNTER — APPOINTMENT (OUTPATIENT)
Age: 59
DRG: 467 | End: 2024-09-17
Payer: MEDICARE

## 2024-09-17 VITALS — BODY MASS INDEX: 29.35 KG/M2 | WEIGHT: 241 LBS | HEIGHT: 76 IN

## 2024-09-17 DIAGNOSIS — T84.012A MECHANICAL FAILURE OF PROSTHETIC RIGHT KNEE JOINT (HCC): Primary | ICD-10-CM

## 2024-09-17 DIAGNOSIS — Z47.1 AFTERCARE FOLLOWING RIGHT KNEE JOINT REPLACEMENT SURGERY: ICD-10-CM

## 2024-09-17 DIAGNOSIS — Z96.651 AFTERCARE FOLLOWING RIGHT KNEE JOINT REPLACEMENT SURGERY: ICD-10-CM

## 2024-09-17 DIAGNOSIS — M00.9 PYOGENIC ARTHRITIS OF RIGHT KNEE JOINT, DUE TO UNSPECIFIED ORGANISM (HCC): ICD-10-CM

## 2024-09-17 DIAGNOSIS — M97.11XD PERIPROSTHETIC FRACTURE AROUND INTERNAL PROSTHETIC RIGHT KNEE JOINT, SUBSEQUENT ENCOUNTER: ICD-10-CM

## 2024-09-17 DIAGNOSIS — M25.59 PAIN IN OTHER JOINT: ICD-10-CM

## 2024-09-17 PROBLEM — M97.11XA PERIPROSTHETIC FRACTURE AROUND INTERNAL PROSTHETIC RIGHT KNEE JOINT: Status: RESOLVED | Noted: 2024-09-17 | Resolved: 2024-09-17

## 2024-09-17 PROBLEM — M97.11XA PERIPROSTHETIC FRACTURE AROUND INTERNAL PROSTHETIC RIGHT KNEE JOINT: Status: ACTIVE | Noted: 2024-09-17

## 2024-09-17 LAB
ABO GROUP BLD: NORMAL
ALBUMIN SERPL BCG-MCNC: 3.8 G/DL (ref 3.5–5)
ALP SERPL-CCNC: 138 U/L (ref 34–104)
ALT SERPL W P-5'-P-CCNC: 18 U/L (ref 7–52)
ANION GAP SERPL CALCULATED.3IONS-SCNC: 9 MMOL/L (ref 4–13)
APTT PPP: 28 SECONDS (ref 23–34)
AST SERPL W P-5'-P-CCNC: 23 U/L (ref 13–39)
BASOPHILS # BLD AUTO: 0.07 THOUSANDS/ΜL (ref 0–0.1)
BASOPHILS NFR BLD AUTO: 1 % (ref 0–1)
BILIRUB SERPL-MCNC: 0.57 MG/DL (ref 0.2–1)
BLD GP AB SCN SERPL QL: NEGATIVE
BUN SERPL-MCNC: 28 MG/DL (ref 5–25)
CALCIUM SERPL-MCNC: 9.3 MG/DL (ref 8.4–10.2)
CHLORIDE SERPL-SCNC: 105 MMOL/L (ref 96–108)
CO2 SERPL-SCNC: 22 MMOL/L (ref 21–32)
CREAT SERPL-MCNC: 0.83 MG/DL (ref 0.6–1.3)
EOSINOPHIL # BLD AUTO: 0.4 THOUSAND/ΜL (ref 0–0.61)
EOSINOPHIL NFR BLD AUTO: 3 % (ref 0–6)
ERYTHROCYTE [DISTWIDTH] IN BLOOD BY AUTOMATED COUNT: 12.7 % (ref 11.6–15.1)
FERRITIN SERPL-MCNC: 445 NG/ML (ref 24–336)
GFR SERPL CREATININE-BSD FRML MDRD: 94 ML/MIN/1.73SQ M
GLUCOSE SERPL-MCNC: 107 MG/DL (ref 65–140)
HCT VFR BLD AUTO: 37.8 % (ref 36.5–49.3)
HGB BLD-MCNC: 12.6 G/DL (ref 12–17)
IMM GRANULOCYTES # BLD AUTO: 0.06 THOUSAND/UL (ref 0–0.2)
IMM GRANULOCYTES NFR BLD AUTO: 1 % (ref 0–2)
INR PPP: 1.08 (ref 0.85–1.19)
IRON SATN MFR SERPL: 26 % (ref 15–50)
IRON SERPL-MCNC: 73 UG/DL (ref 50–212)
LYMPHOCYTES # BLD AUTO: 2.5 THOUSANDS/ΜL (ref 0.6–4.47)
LYMPHOCYTES NFR BLD AUTO: 19 % (ref 14–44)
MCH RBC QN AUTO: 29.9 PG (ref 26.8–34.3)
MCHC RBC AUTO-ENTMCNC: 33.3 G/DL (ref 31.4–37.4)
MCV RBC AUTO: 90 FL (ref 82–98)
MONOCYTES # BLD AUTO: 1.39 THOUSAND/ΜL (ref 0.17–1.22)
MONOCYTES NFR BLD AUTO: 11 % (ref 4–12)
NEUTROPHILS # BLD AUTO: 8.5 THOUSANDS/ΜL (ref 1.85–7.62)
NEUTS SEG NFR BLD AUTO: 65 % (ref 43–75)
NRBC BLD AUTO-RTO: 0 /100 WBCS
PLATELET # BLD AUTO: 237 THOUSANDS/UL (ref 149–390)
PMV BLD AUTO: 11.8 FL (ref 8.9–12.7)
POTASSIUM SERPL-SCNC: 4.3 MMOL/L (ref 3.5–5.3)
PROT SERPL-MCNC: 7.1 G/DL (ref 6.4–8.4)
PROTHROMBIN TIME: 14.2 SECONDS (ref 12.3–15)
RBC # BLD AUTO: 4.21 MILLION/UL (ref 3.88–5.62)
RETICS # AUTO: NORMAL 10*3/UL (ref 14356–105094)
RETICS # CALC: 1.61 % (ref 0.37–1.87)
RH BLD: NEGATIVE
SODIUM SERPL-SCNC: 136 MMOL/L (ref 135–147)
SPECIMEN EXPIRATION DATE: NORMAL
TIBC SERPL-MCNC: 286 UG/DL (ref 250–450)
UIBC SERPL-MCNC: 213 UG/DL (ref 155–355)
WBC # BLD AUTO: 12.92 THOUSAND/UL (ref 4.31–10.16)

## 2024-09-17 PROCEDURE — 86901 BLOOD TYPING SEROLOGIC RH(D): CPT

## 2024-09-17 PROCEDURE — 99284 EMERGENCY DEPT VISIT MOD MDM: CPT

## 2024-09-17 PROCEDURE — 73700 CT LOWER EXTREMITY W/O DYE: CPT

## 2024-09-17 PROCEDURE — 85025 COMPLETE CBC W/AUTO DIFF WBC: CPT

## 2024-09-17 PROCEDURE — 85610 PROTHROMBIN TIME: CPT

## 2024-09-17 PROCEDURE — 80053 COMPREHEN METABOLIC PANEL: CPT

## 2024-09-17 PROCEDURE — 73562 X-RAY EXAM OF KNEE 3: CPT

## 2024-09-17 PROCEDURE — 99285 EMERGENCY DEPT VISIT HI MDM: CPT | Performed by: EMERGENCY MEDICINE

## 2024-09-17 PROCEDURE — 83550 IRON BINDING TEST: CPT

## 2024-09-17 PROCEDURE — 83540 ASSAY OF IRON: CPT

## 2024-09-17 PROCEDURE — 99024 POSTOP FOLLOW-UP VISIT: CPT | Performed by: STUDENT IN AN ORGANIZED HEALTH CARE EDUCATION/TRAINING PROGRAM

## 2024-09-17 PROCEDURE — 85730 THROMBOPLASTIN TIME PARTIAL: CPT

## 2024-09-17 PROCEDURE — 82728 ASSAY OF FERRITIN: CPT

## 2024-09-17 PROCEDURE — 86850 RBC ANTIBODY SCREEN: CPT

## 2024-09-17 PROCEDURE — 85045 AUTOMATED RETICULOCYTE COUNT: CPT

## 2024-09-17 PROCEDURE — 86900 BLOOD TYPING SEROLOGIC ABO: CPT

## 2024-09-17 PROCEDURE — 99223 1ST HOSP IP/OBS HIGH 75: CPT | Performed by: STUDENT IN AN ORGANIZED HEALTH CARE EDUCATION/TRAINING PROGRAM

## 2024-09-17 PROCEDURE — 36415 COLL VENOUS BLD VENIPUNCTURE: CPT

## 2024-09-17 RX ORDER — HYDROMORPHONE HYDROCHLORIDE 2 MG/1
4 TABLET ORAL EVERY 4 HOURS PRN
Status: DISCONTINUED | OUTPATIENT
Start: 2024-09-17 | End: 2024-09-20

## 2024-09-17 RX ORDER — TRANEXAMIC ACID 10 MG/ML
1000 INJECTION, SOLUTION INTRAVENOUS
Status: ACTIVE | OUTPATIENT
Start: 2024-09-19 | End: 2024-09-20

## 2024-09-17 RX ORDER — CEFAZOLIN SODIUM 2 G/50ML
2000 SOLUTION INTRAVENOUS
Status: DISPENSED | OUTPATIENT
Start: 2024-09-19 | End: 2024-09-20

## 2024-09-17 RX ORDER — TRANEXAMIC ACID 10 MG/ML
1000 INJECTION, SOLUTION INTRAVENOUS
Status: CANCELLED | OUTPATIENT
Start: 2024-09-20 | End: 2024-09-21

## 2024-09-17 RX ORDER — ONDANSETRON 2 MG/ML
4 INJECTION INTRAMUSCULAR; INTRAVENOUS EVERY 6 HOURS PRN
Status: DISCONTINUED | OUTPATIENT
Start: 2024-09-17 | End: 2024-09-19 | Stop reason: SDUPTHER

## 2024-09-17 RX ORDER — HYDROMORPHONE HYDROCHLORIDE 2 MG/1
2 TABLET ORAL EVERY 4 HOURS PRN
Status: DISCONTINUED | OUTPATIENT
Start: 2024-09-17 | End: 2024-09-20

## 2024-09-17 RX ORDER — SODIUM CHLORIDE, SODIUM LACTATE, POTASSIUM CHLORIDE, CALCIUM CHLORIDE 600; 310; 30; 20 MG/100ML; MG/100ML; MG/100ML; MG/100ML
125 INJECTION, SOLUTION INTRAVENOUS CONTINUOUS
Status: CANCELLED | OUTPATIENT
Start: 2024-09-19

## 2024-09-17 RX ORDER — CHLORHEXIDINE GLUCONATE ORAL RINSE 1.2 MG/ML
15 SOLUTION DENTAL ONCE
Status: CANCELLED | OUTPATIENT
Start: 2024-09-19 | End: 2024-09-17

## 2024-09-17 RX ORDER — OXYCODONE HYDROCHLORIDE 5 MG/1
5 TABLET ORAL EVERY 4 HOURS PRN
Status: DISCONTINUED | OUTPATIENT
Start: 2024-09-17 | End: 2024-09-17

## 2024-09-17 RX ORDER — ACETAMINOPHEN 325 MG/1
975 TABLET ORAL ONCE
Status: CANCELLED | OUTPATIENT
Start: 2024-09-19 | End: 2024-09-17

## 2024-09-17 RX ORDER — OXYCODONE HYDROCHLORIDE 10 MG/1
10 TABLET ORAL EVERY 4 HOURS PRN
Status: DISCONTINUED | OUTPATIENT
Start: 2024-09-17 | End: 2024-09-17

## 2024-09-17 RX ORDER — SENNOSIDES 8.6 MG
1 TABLET ORAL DAILY
Status: DISCONTINUED | OUTPATIENT
Start: 2024-09-17 | End: 2024-09-19 | Stop reason: SDUPTHER

## 2024-09-17 RX ORDER — CEFAZOLIN SODIUM 2 G/50ML
2000 SOLUTION INTRAVENOUS
Status: CANCELLED | OUTPATIENT
Start: 2024-09-20 | End: 2024-09-21

## 2024-09-17 RX ORDER — ENOXAPARIN SODIUM 100 MG/ML
40 INJECTION SUBCUTANEOUS DAILY
Status: COMPLETED | OUTPATIENT
Start: 2024-09-17 | End: 2024-09-18

## 2024-09-17 RX ADMIN — ENOXAPARIN SODIUM 40 MG: 40 INJECTION SUBCUTANEOUS at 15:49

## 2024-09-17 NOTE — PLAN OF CARE

## 2024-09-17 NOTE — ASSESSMENT & PLAN NOTE
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 Hendrix. Patient states he fell about 1 week ago and developed new onset right knee pain, instability and inability to bear weight. Xray demonstrates comminuted intraarticular tibial plateau fracture with displacement of prior antibiotic component.  NWB RLE   Plan for OR 9/19 with Dr. Hendrix for right revision total knee arthroplasty   Consent obtained. All questions and concerned addressed  TXA and ancef on call to OR   Preop labs obtained   CT right lower extremity completed   Multi modal pain regime  Ortho will follow

## 2024-09-17 NOTE — H&P
H&P - Orthopedics   Name: Aaron Richards 59 y.o. 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 Hendrix. Patient states he fell about 1 week ago and developed new onset right knee pain, instability and inability to bear weight. Xray demonstrates comminuted intraarticular tibial plateau fracture with displacement of prior antibiotic component.  NWB RLE   Plan for OR 9/19 with Dr. Hendrix for right revision total knee arthroplasty   Consent obtained. 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 Patient sustained a fall approximately 1 weeks ago when coming home after PT. He was immediately unable to bear weight on the right lower extremity and had a lot of swelling. Since then he has been wheelchair bound. Patient was seen and examined at bedside. He is aware of the situation and presents from Dr. Jacobson office. He has no questions or concerns. No new symptoms. Denies fevers, chills, SOB, CP, numbness, tingling.    Review of Systems   Constitutional:  Negative for chills and fever.   HENT:  Negative for ear pain and sore throat.    Eyes:  Negative for pain and visual disturbance.   Respiratory:  Negative for cough and shortness of breath.    Cardiovascular:  Negative for chest pain and palpitations.   Gastrointestinal:   Negative for abdominal pain and vomiting.   Genitourinary:  Negative for dysuria and hematuria.   Musculoskeletal:  Negative for arthralgias and back pain.   Skin:  Negative for color change and rash.   Neurological:  Negative for seizures and syncope.   All other systems reviewed and are negative.    I have reviewed the patient's PMH, PSH, Social History, Family History, Meds, and Allergies    Objective      Temp:  [97.1 °F (36.2 °C)-98.1 °F (36.7 °C)] 97.1 °F (36.2 °C)  HR:  [67-78] 78  Resp:  [18-19] 18  BP: (129-160)/(77-97) 160/97  O2 Device: None (Room air)          I/O       None          Lines/Drains/Airways       Active Status       Name Placement date Placement time Site Days    PICC Line 07/19/24 Right Basilic 07/19/24  1050  Basilic  60                  Physical Exam  Vitals and nursing note reviewed.   Constitutional:       General: He is not in acute distress.     Appearance: He is well-developed.   HENT:      Head: Normocephalic and atraumatic.   Eyes:      Conjunctiva/sclera: Conjunctivae normal.   Cardiovascular:      Rate and Rhythm: Normal rate and regular rhythm.      Heart sounds: No murmur heard.  Pulmonary:      Effort: Pulmonary effort is normal. No respiratory distress.      Breath sounds: Normal breath sounds.   Abdominal:      Palpations: Abdomen is soft.      Tenderness: There is no abdominal tenderness.   Musculoskeletal:         General: No swelling.      Cervical back: Neck supple.   Skin:     General: Skin is warm and dry.      Capillary Refill: Capillary refill takes less than 2 seconds.   Neurological:      Mental Status: He is alert.   Psychiatric:         Mood and Affect: Mood normal.     Right lower extremity Exam  Incision: clean, dry, intact  Large effusion present globally over knee with healing ecchymoses  ROM: unassessed due to known fracture  5/5 IP/Q/HS/TA/GS, 2+ DP/PT, SILT DP/SP/S/S/TN    RADIOLOGY   XR right knee 9/17/2024: comminuted intraarticular tibial plateau  fracture with displacement of prior antibiotic component.     CT scan right knee demonstrates displaced tibial plateau hardware component and fragment fractures along the posterior aspect of the tibial plateau. There is a large complex joint effusion with diffuse synovial thickening       Lab Results: I have reviewed the following results:   Recent Labs     09/17/24  1354   WBC 12.92*   HGB 12.6   HCT 37.8      BUN 28*   CREATININE 0.83   PTT 28   INR 1.08     Blood Culture:   Lab Results   Component Value Date    BLOODCX No Growth After 5 Days. 10/10/2020    BLOODCX No Growth After 5 Days. 10/10/2020     Wound Culture:   Lab Results   Component Value Date    WOUNDCULT 2+ Growth of Morganella morganii (A) 08/30/2020    WOUNDCULT (A) 08/30/2020     2+ Growth of Methicillin Resistant Staphylococcus aureus    WOUNDCULT 1+ Growth of Enterobacter cloacae complex (A) 08/30/2020    WOUNDCULT 1+ Growth of Enterococcus faecalis (A) 08/30/2020

## 2024-09-17 NOTE — PROGRESS NOTES
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 Patient sustained a fall approximately 1.5 weeks ago when coming home after PT. He was immediately unable to bear weight on the right lower extremity and had a lot of swelling. Since then he has been wheelchair bound.     Physical Exam:  Incision: clean, dry, intact  Large effusion present globally over knee with healing ecchymoses  ROM: unassessed due to known fracture  5/5 IP/Q/HS/TA/GS, SILT DP/SP/S/S/TN  Foot perfused    XR right knee 9/17/2024: comminuted intraarticular tibial plateau fracture with displacement of tibial component posteriorly. Vascular stent tented without disruption.      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 He recently sustained a new fracture of the tibial plateau on the right side which has rendered him unable to walk or move his knee.    - Non weight bearing to the right lower extremity  - DVT ppx: aspirin 81mg twice daily x 4 weeks, complete  - Completed IV abx (daptomycin), currently on PO doxycycline in conjunction with ID for continued ppx  - PT/OT  - Patient advised to go to [facility] ED and be admitted to my service   - Plan for OR Thursday  - CT right knee when admitted to [facility] for preoperative planning  - patient will require converse to hinged prosthesis with possible proximal tibial replacement. CT for surgical planning.     Scribe Attestation      I,:   am acting as a scribe while in the presence of the attending physician.:       I,:   personally performed the services described in this documentation    as scribed in my presence.:

## 2024-09-17 NOTE — ED ATTENDING ATTESTATION
9/17/2024  I, Bam Guzman MD, saw and evaluated the patient. I have discussed the patient with the resident/non-physician practitioner and agree with the resident's/non-physician practitioner's findings, Plan of Care, and MDM as documented in the resident's/non-physician practitioner's note, except where noted. All available labs and Radiology studies were reviewed.  I was present for key portions of any procedure(s) performed by the resident/non-physician practitioner and I was immediately available to provide assistance.       At this point I agree with the current assessment done in the Emergency Department.  I have conducted an independent evaluation of this patient a history and physical is as follows:    Chief Complaint   Patient presents with    Knee Pain     Pt reports right knee surgery, and had knee replacement and has since fell after and re-injured leg/knee (week 1/2 ago). Pt states surgeon sent him for another knee replacement? Wanted him to be admitted and get blood work & mri.    58 yo M whose note from ortho today says he is to be admitted for surgery on 9/19 to revise his right knee replacement after suffering a fracture.      D/W PA covering orthopedics, asked for labs and CT, and will admit to their service.      VSS.  NAD.  Chest clear.  R knee swollen with effusion and healing ecchymoses          ED Course         Critical Care Time  Procedures

## 2024-09-17 NOTE — ED PROVIDER NOTES
1. 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  I contacted the orthopedic team on-call and they verified that Dr. Hendrix would be accepting the patient to his service.  They asked that I order a CT of his knee and lab work which I did.  Patient was in agreement with plan and had no questions at this time.    Amount and/or Complexity of Data Reviewed  Labs: ordered.  Radiology: ordered.                     Medications   enoxaparin (LOVENOX) subcutaneous injection 40 mg (40 mg Subcutaneous Given 9/17/24 1549)   oxyCODONE (ROXICODONE) IR tablet 5 mg (has no administration in time range)   oxyCODONE (ROXICODONE) immediate release tablet 10 mg (has no administration in time range)   senna (SENOKOT) tablet 8.6 mg (8.6 mg Oral Not Given 9/17/24 1551)   ondansetron (ZOFRAN) injection 4 mg (has no administration in time range)   ceFAZolin (ANCEF) IVPB (premix in dextrose) 2,000 mg 50 mL (has no administration in time range)   tranexamic acid (CYKLOKAPRON) 1000-0.7 MG/100ML-% injection 1,000 mg (has no administration in time range)       History of Present Illness       Patient is a 59-year-old male who had a right TKA in July and refractured his tibia a week and a half ago after falling down stairs. He was seen by Dr. Hendrix today in the office and he sent him over to the ER to be admitted for surgery this Thursday.      Knee Pain  Associated symptoms: no back pain and no fever            Review of Systems   Constitutional:  Negative for chills and fever.   HENT:  Negative for ear pain and sore throat.    Eyes:  Negative for pain and visual disturbance.   Respiratory:  Negative for cough and shortness of breath.    Cardiovascular:  Negative for chest pain and palpitations.   Gastrointestinal:  Negative for abdominal pain and vomiting.   Genitourinary:  Negative for dysuria  and hematuria.   Musculoskeletal:  Positive for joint swelling (Right knee). Negative for arthralgias and back pain.   Skin:  Negative for color change, pallor, rash and wound.   Neurological:  Negative for seizures and syncope.   All other systems reviewed and are negative.          Objective     ED Triage Vitals   Temperature Pulse Blood Pressure Respirations SpO2 Patient Position - Orthostatic VS   09/17/24 1244 09/17/24 1244 09/17/24 1244 09/17/24 1244 09/17/24 1244 09/17/24 1244   98.1 °F (36.7 °C) 76 129/77 19 100 % Sitting      Temp Source Heart Rate Source BP Location FiO2 (%) Pain Score    09/17/24 1244 09/17/24 1244 09/17/24 1244 -- 09/17/24 1703    Oral Monitor Right arm  7        Physical Exam  Vitals and nursing note reviewed.   Constitutional:       General: He is not in acute distress.     Appearance: He is well-developed.   HENT:      Head: Normocephalic and atraumatic.   Eyes:      Conjunctiva/sclera: Conjunctivae normal.   Cardiovascular:      Rate and Rhythm: Normal rate and regular rhythm.      Heart sounds: No murmur heard.  Pulmonary:      Effort: Pulmonary effort is normal. No respiratory distress.      Breath sounds: Normal breath sounds.   Abdominal:      Palpations: Abdomen is soft.      Tenderness: There is no abdominal tenderness.   Musculoskeletal:         General: Swelling (Right knee) and tenderness (Right knee) present.      Cervical back: Neck supple.   Skin:     General: Skin is warm and dry.      Capillary Refill: Capillary refill takes less than 2 seconds.      Comments: Incision clean, dry, and intact     Neurological:      General: No focal deficit present.      Mental Status: He is alert.   Psychiatric:         Mood and Affect: Mood normal.         Labs Reviewed   CBC AND DIFFERENTIAL - Abnormal       Result Value    WBC 12.92 (*)     RBC 4.21      Hemoglobin 12.6      Hematocrit 37.8      MCV 90      MCH 29.9      MCHC 33.3      RDW 12.7      MPV 11.8      Platelets 237       nRBC 0      Segmented % 65      Immature Grans % 1      Lymphocytes % 19      Monocytes % 11      Eosinophils Relative 3      Basophils Relative 1      Absolute Neutrophils 8.50 (*)     Absolute Immature Grans 0.06      Absolute Lymphocytes 2.50      Absolute Monocytes 1.39 (*)     Eosinophils Absolute 0.40      Basophils Absolute 0.07     COMPREHENSIVE METABOLIC PANEL - Abnormal    Sodium 136      Potassium 4.3      Chloride 105      CO2 22      ANION GAP 9      BUN 28 (*)     Creatinine 0.83      Glucose 107      Calcium 9.3      AST 23      ALT 18      Alkaline Phosphatase 138 (*)     Total Protein 7.1      Albumin 3.8      Total Bilirubin 0.57      eGFR 94      Narrative:     [employer] guidelines for Chronic Kidney Disease (CKD):     Stage 1 with normal or high GFR (GFR > 90 mL/min/1.73 square meters)    Stage 2 Mild CKD (GFR = 60-89 mL/min/1.73 square meters)    Stage 3A Moderate CKD (GFR = 45-59 mL/min/1.73 square meters)    Stage 3B Moderate CKD (GFR = 30-44 mL/min/1.73 square meters)    Stage 4 Severe CKD (GFR = 15-29 mL/min/1.73 square meters)    Stage 5 End Stage CKD (GFR <15 mL/min/1.73 square meters)  Note: GFR calculation is accurate only with a steady state creatinine   APTT - Normal    PTT 28     PROTIME-INR - Normal    Protime 14.2      INR 1.08      Narrative:     INR Therapeutic Range    Indication                                             INR Range      Atrial Fibrillation                                               2.0-3.0  Hypercoagulable State                                    2.0.2.3  Left Ventricular Asist Device                            2.0-3.0  Mechanical Heart Valve                                  -    Aortic(with afib, MI, embolism, HF, LA enlargement,    and/or coagulopathy)                                     2.0-3.0 (2.5-3.5)     Mitral                                                             2.5-3.5  Prosthetic/Bioprosthetic Heart Valve                2.0-3.0  Venous thromboembolism (VTE: VT, PE        2.0-3.0   RETICULOCYTES - Normal    Retic Ct Abs 68,100      Retic Ct Pct 1.61     ANEMIA PANEL W/REFLEX   TIBC PANEL (INCL. IRON, TIBC, % IRON SATURATION)   FERRITIN   TYPE AND SCREEN    ABO Grouping A      Rh Factor Negative      Antibody Screen Negative      Specimen Expiration Date 20240920       CT lower extremity wo contrast right   Final Interpretation by Jono Garcia MD (09/17 1445)      1.  No interval change in the appearance of the displaced tibial plateau hardware component and fragment fractures along the posterior aspect of the tibial plateau. There is a large complex joint effusion with diffuse synovial thickening. Please see the    body of the report for further description of the pertinent findings.            Workstation performed: [id number]             Procedures         Umer Elizabeth DO  09/17/24 3313

## 2024-09-18 ENCOUNTER — APPOINTMENT (OUTPATIENT)
Dept: PHYSICAL THERAPY | Facility: CLINIC | Age: 59
End: 2024-09-18
Payer: MEDICARE

## 2024-09-18 ENCOUNTER — ANESTHESIA EVENT (INPATIENT)
Dept: PERIOP | Facility: HOSPITAL | Age: 59
DRG: 467 | End: 2024-09-18
Payer: MEDICARE

## 2024-09-18 LAB
GLUCOSE SERPL-MCNC: 249 MG/DL (ref 65–140)
GLUCOSE SERPL-MCNC: 309 MG/DL (ref 65–140)

## 2024-09-18 PROCEDURE — 82948 REAGENT STRIP/BLOOD GLUCOSE: CPT

## 2024-09-18 PROCEDURE — NC001 PR NO CHARGE

## 2024-09-18 RX ORDER — INSULIN LISPRO 100 [IU]/ML
1-6 INJECTION, SOLUTION INTRAVENOUS; SUBCUTANEOUS
Status: DISCONTINUED | OUTPATIENT
Start: 2024-09-18 | End: 2024-09-19

## 2024-09-18 RX ORDER — INSULIN GLARGINE 100 [IU]/ML
25 INJECTION, SOLUTION SUBCUTANEOUS EVERY 12 HOURS SCHEDULED
Status: DISCONTINUED | OUTPATIENT
Start: 2024-09-18 | End: 2024-09-18

## 2024-09-18 RX ORDER — INSULIN GLARGINE 100 [IU]/ML
15 INJECTION, SOLUTION SUBCUTANEOUS EVERY 12 HOURS SCHEDULED
Status: DISCONTINUED | OUTPATIENT
Start: 2024-09-18 | End: 2024-09-20

## 2024-09-18 RX ORDER — ACETAMINOPHEN 325 MG/1
650 TABLET ORAL EVERY 4 HOURS PRN
Status: CANCELLED | OUTPATIENT
Start: 2024-09-18

## 2024-09-18 RX ORDER — INSULIN LISPRO 100 [IU]/ML
1-6 INJECTION, SOLUTION INTRAVENOUS; SUBCUTANEOUS
Status: CANCELLED | OUTPATIENT
Start: 2024-09-19

## 2024-09-18 RX ORDER — DOCUSATE SODIUM 100 MG/1
100 CAPSULE, LIQUID FILLED ORAL 2 TIMES DAILY PRN
Status: DISCONTINUED | OUTPATIENT
Start: 2024-09-18 | End: 2024-09-23 | Stop reason: HOSPADM

## 2024-09-18 RX ORDER — PANTOPRAZOLE SODIUM 40 MG/1
40 TABLET, DELAYED RELEASE ORAL
Status: DISCONTINUED | OUTPATIENT
Start: 2024-09-19 | End: 2024-09-19 | Stop reason: SDUPTHER

## 2024-09-18 RX ORDER — ESCITALOPRAM OXALATE 10 MG/1
5 TABLET ORAL DAILY
Status: DISCONTINUED | OUTPATIENT
Start: 2024-09-19 | End: 2024-09-23 | Stop reason: HOSPADM

## 2024-09-18 RX ORDER — ATORVASTATIN CALCIUM 40 MG/1
40 TABLET, FILM COATED ORAL
Status: DISCONTINUED | OUTPATIENT
Start: 2024-09-18 | End: 2024-09-23 | Stop reason: HOSPADM

## 2024-09-18 RX ADMIN — ENOXAPARIN SODIUM 40 MG: 40 INJECTION SUBCUTANEOUS at 08:39

## 2024-09-18 RX ADMIN — INSULIN LISPRO 3 UNITS: 100 INJECTION, SOLUTION INTRAVENOUS; SUBCUTANEOUS at 18:15

## 2024-09-18 RX ADMIN — SENNOSIDES 8.6 MG: 8.6 TABLET, FILM COATED ORAL at 08:39

## 2024-09-18 RX ADMIN — INSULIN GLARGINE 15 UNITS: 100 INJECTION, SOLUTION SUBCUTANEOUS at 20:34

## 2024-09-18 RX ADMIN — HYDROMORPHONE HYDROCHLORIDE 2 MG: 2 TABLET ORAL at 18:19

## 2024-09-18 RX ADMIN — ATORVASTATIN CALCIUM 40 MG: 40 TABLET, FILM COATED ORAL at 18:15

## 2024-09-18 RX ADMIN — INSULIN LISPRO 4 UNITS: 100 INJECTION, SOLUTION INTRAVENOUS; SUBCUTANEOUS at 21:11

## 2024-09-18 NOTE — PLAN OF CARE
Problem: Potential for Falls  Goal: Patient will remain free of falls  Description: INTERVENTIONS:  - Educate patient/family on patient safety including physical limitations  - Instruct patient to call for assistance with activity   - Consult OT/PT to assist with strengthening/mobility   - Keep Call bell within reach  - Keep bed low and locked with side rails adjusted as appropriate  - Keep care items and personal belongings within reach  - Initiate and maintain comfort rounds  - Make Fall Risk Sign visible to staff  - Initiate/Maintain bed alarm  - Obtain necessary fall risk management equipment: bed alarm, call bell,  socks  - Apply yellow socks and bracelet for high fall risk patients  - Consider moving patient to room near nurses station  Outcome: Progressing     Problem: PAIN - ADULT  Goal: Verbalizes/displays adequate comfort level or baseline comfort level  Description: Interventions:  - Encourage patient to monitor pain and request assistance  - Assess pain using appropriate pain scale  - Administer analgesics based on type and severity of pain and evaluate response  - Implement non-pharmacological measures as appropriate and evaluate response  - Consider cultural and social influences on pain and pain management  - Notify physician/advanced practitioner if interventions unsuccessful or patient reports new pain  Outcome: Progressing     Problem: INFECTION - ADULT  Goal: Absence or prevention of progression during hospitalization  Description: INTERVENTIONS:  - Assess and monitor for signs and symptoms of infection  - Monitor lab/diagnostic results  - Monitor all insertion sites, i.e. indwelling lines, tubes, and drains  - Monitor endotracheal if appropriate and nasal secretions for changes in amount and color  - [place] appropriate cooling/warming therapies per order  - Administer medications as ordered  - Instruct and encourage patient and family to use good hand hygiene technique  - Identify and  instruct in appropriate isolation precautions for identified infection/condition  Outcome: Progressing  Goal: Absence of fever/infection during neutropenic period  Description: INTERVENTIONS:  - Monitor WBC    Outcome: Progressing     Problem: SAFETY ADULT  Goal: Patient will remain free of falls  Description: INTERVENTIONS:  - Educate patient/family on patient safety including physical limitations  - Instruct patient to call for assistance with activity   - Consult OT/PT to assist with strengthening/mobility   - Keep Call bell within reach  - Keep bed low and locked with side rails adjusted as appropriate  - Keep care items and personal belongings within reach  - Initiate and maintain comfort rounds  - Make Fall Risk Sign visible to staff  - Initiate/Maintain bed alarm  - Obtain necessary fall risk management equipment: bed alarm, call bell,  socks  - Apply yellow socks and bracelet for high fall risk patients  - Consider moving patient to room near nurses station  Outcome: Progressing  Goal: Maintain or return to baseline ADL function  Description: INTERVENTIONS:  -  Assess patient's ability to carry out ADLs; assess patient's baseline for ADL function and identify physical deficits which impact ability to perform ADLs (bathing, care of mouth/teeth, toileting, grooming, dressing, etc.)  - Assess/evaluate cause of self-care deficits   - Assess range of motion  - Assess patient's mobility; develop plan if impaired  - Assess patient's need for assistive devices and provide as appropriate  - Encourage maximum independence but intervene and supervise when necessary  - Involve family in performance of ADLs  - Assess for home care needs following discharge   - Consider OT consult to assist with ADL evaluation and planning for discharge  - Provide patient education as appropriate  Outcome: Progressing  Goal: Maintains/Returns to pre admission functional level  Description: INTERVENTIONS:  - Perform AM-PAC 6 Click  Basic Mobility/ Daily Activity assessment daily.  - Set and communicate daily mobility goal to care team and patient/family/caregiver.   - Collaborate with rehabilitation services on mobility goals if consulted  - Perform Range of Motion 3 times a day.  - Reposition patient every 2 hours.  - Dangle patient 3 times a day  - Stand patient 3 times a day  - Ambulate patient 3 times a day  - Out of bed to chair 3 times a day   - Out of bed for meals 3 times a day  - Out of bed for toileting  - Record patient progress and toleration of activity level   Outcome: Progressing     Problem: DISCHARGE PLANNING  Goal: Discharge to home or other facility with appropriate resources  Description: INTERVENTIONS:  - Identify barriers to discharge w/patient and caregiver  - Arrange for needed discharge resources and transportation as appropriate  - Identify discharge learning needs (meds, wound care, etc.)  - Arrange for interpretive services to assist at discharge as needed  - Refer to Case Management Department for coordinating discharge planning if the patient needs post-hospital services based on physician/advanced practitioner order or complex needs related to functional status, cognitive ability, or social support system  Outcome: Progressing     Problem: Knowledge Deficit  Goal: Patient/family/caregiver demonstrates understanding of disease process, treatment plan, medications, and discharge instructions  Description: Complete learning assessment and assess knowledge base.  Interventions:  - Provide teaching at level of understanding  - Provide teaching via preferred learning methods  Outcome: Progressing     Problem: Prexisting or High Potential for Compromised Skin Integrity  Goal: Skin integrity is maintained or improved  Description: INTERVENTIONS:  - Identify patients at risk for skin breakdown  - Assess and monitor skin integrity  - Assess and monitor nutrition and hydration status  - Monitor labs   - Assess for  incontinence   - Turn and reposition patient  - Assist with mobility/ambulation  - Relieve pressure over bony prominences  - Avoid friction and shearing  - Provide appropriate hygiene as needed including keeping skin clean and dry  - Evaluate need for skin moisturizer/barrier cream  - Collaborate with interdisciplinary team   - Patient/family teaching  - Consider wound care consult   Outcome: Progressing

## 2024-09-18 NOTE — ASSESSMENT & PLAN NOTE
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 Hendrix. Patient states he fell about 1 week ago and developed new onset right knee pain, instability and inability to bear weight. X-ray demonstrates comminuted intraarticular tibial plateau fracture with displacement of prior antibiotic component.  NWB RLE   Plan for OR 9/19 with Dr. Hendrix for right revision total knee arthroplasty   Consent obtained. All questions and concerned addressed  TXA and ancef on call to OR   NPO at midnight  Hold AM blood thinners 9/19  Preop labs obtained   CT right lower extremity completed   Multi modal pain regimen  Ortho will follow

## 2024-09-18 NOTE — ANESTHESIA PREPROCEDURE EVALUATION
Procedure:  ARTHROPLASTY KNEE TOTAL REVISION (Right: Knee)    Relevant Problems   CARDIO   (+) Hyperlipidemia   (+) Hypertension, essential   (+) Type 2 diabetes mellitus with diabetic peripheral angiopathy without gangrene (HCC)      ENDO   (+) Acquired hypothyroidism   (+) Type 2 diabetes mellitus with diabetic peripheral angiopathy without gangrene (HCC)   (+) Type 2 diabetes mellitus without complication, with long-term current use of insulin ([facility])      GI/HEPATIC   (+) Gastroesophageal reflux disease without esophagitis      /RENAL   (+) MARKELL (acute kidney injury) ([facility])   (+) Chronic kidney disease-mineral and bone disorder   (+) Stage 3b chronic kidney disease (HCC)      HEMATOLOGY   (+) Anemia      MUSCULOSKELETAL   (+) Septic arthritis of knee ([facility])      NEURO/PSYCH   (+) Depression and anxiety   (+) Diabetic neuropathy ([facility])      PULMONARY   (+) MANNY (obstructive sleep apnea)        Physical Exam    Airway    Mallampati score: II  TM Distance: >3 FB  Neck ROM: full     Dental   Comment: Lower left front/canine loose.     Cardiovascular  Rhythm: regular, Rate: normal, Cardiovascular exam normal    Pulmonary  Pulmonary exam normal Breath sounds clear to auscultation    Other Findings        Anesthesia Plan  ASA Score- 2     Anesthesia Type- general with ASA Monitors.         Additional Monitors:     Airway Plan:     Comment: Plan for general given complexity of case and possible long duration. Consented for nerve block. Plan for pre-op adductor canal with exparel   Pt is aware that his loose tooth may come out under anesthesia..       Plan Factors-    Chart reviewed. EKG reviewed. Imaging results reviewed. Existing labs reviewed. Patient summary reviewed.          Obstructive sleep apnea risk education given perioperatively.        Induction- intravenous.    Postoperative Plan- Plan for postoperative opioid use.     Perioperative Resuscitation Plan - Level 1 - Full Code.       Informed Consent- Anesthetic  plan and risks discussed with patient.

## 2024-09-18 NOTE — PLAN OF CARE
Problem: Potential for Falls  Goal: Patient will remain free of falls  Description: INTERVENTIONS:  - Educate patient/family on patient safety including physical limitations  - Instruct patient to call for assistance with activity   - Consult OT/PT to assist with strengthening/mobility   - Keep Call bell within reach  - Keep bed low and locked with side rails adjusted as appropriate  - Keep care items and personal belongings within reach  - Initiate and maintain comfort rounds  - Make Fall Risk Sign visible to staff  - Offer Toileting every 2 Hours, in advance of need  - Initiate/Maintain bed alarm  - Obtain necessary fall risk management equipment: wheel chair  - Apply yellow socks and bracelet for high fall risk patients  - Consider moving patient to room near nurses station  Outcome: Progressing     Problem: PAIN - ADULT  Goal: Verbalizes/displays adequate comfort level or baseline comfort level  Description: Interventions:  - Encourage patient to monitor pain and request assistance  - Assess pain using appropriate pain scale  - Administer analgesics based on type and severity of pain and evaluate response  - Implement non-pharmacological measures as appropriate and evaluate response  - Consider cultural and social influences on pain and pain management  - Notify physician/advanced practitioner if interventions unsuccessful or patient reports new pain  Outcome: Progressing     Problem: SAFETY ADULT  Goal: Patient will remain free of falls  Description: INTERVENTIONS:  - Educate patient/family on patient safety including physical limitations  - Instruct patient to call for assistance with activity   - Consult OT/PT to assist with strengthening/mobility   - Keep Call bell within reach  - Keep bed low and locked with side rails adjusted as appropriate  - Keep care items and personal belongings within reach  - Initiate and maintain comfort rounds  - Make Fall Risk Sign visible to staff  - Offer Toileting every 2  Hours, in advance of need  - Initiate/Maintain bed alarm  - Obtain necessary fall risk management equipment: wheelchair  - Apply yellow socks and bracelet for high fall risk patients  - Consider moving patient to room near nurses station  Outcome: Progressing

## 2024-09-18 NOTE — PROGRESS NOTES
Progress Note - Orthopedics   Name: Aaron Richards 59 y.o. 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 Hendrix. Patient states he fell about 1 week ago and developed new onset right knee pain, instability and inability to bear weight. X-ray demonstrates comminuted intraarticular tibial plateau fracture with displacement of prior antibiotic component.  NWB RLE   Plan for OR 9/19 with Dr. Hendrix for right revision total knee arthroplasty   Consent obtained. All questions and concerned addressed  TXA and ancef on call to OR   NPO at midnight  Hold AM blood thinners 9/19  Preop labs obtained   CT right lower extremity completed   Multi modal pain regimen  Ortho will follow     Orthopedics service will follow.    History of Present Illness   59 y.o.male with right comminuted intraarticular tibial plateau fracture with displacement of prior antibiotic component.  No new acute overnight events, no new complaints.  Pain is reasonably well-controlled.  He has numbness in his right foot at baseline but states it has been worse since his injury.  He denies subjective fevers, chills, CP, SOB, or N/V.    Objective      Temp:  [97.1 °F (36.2 °C)-98.1 °F (36.7 °C)] 97.2 °F (36.2 °C)  HR:  [67-85] 85  Resp:  [18-19] 18  BP: (129-160)/(77-97) 141/85  O2 Device: None (Room air)          I/O         09/16 0701 09/17 0700 09/17 0701  09/18 0700 09/18 0701  09/19 0700    P.O.   235    Total Intake(mL/kg)   235 (2.2)    Urine (mL/kg/hr)  200 500 (2.1)    Total Output  200 500    Net  -200 -265                 Lines/Drains/Airways       Active Status       None                  Physical  Exam  Vitals and nursing note reviewed.   Constitutional:       General: He is not in acute distress.     Appearance: He is well-developed.   HENT:      Head: Normocephalic and atraumatic.   Eyes:      Conjunctiva/sclera: Conjunctivae normal.   Cardiovascular:      Rate and Rhythm: Normal rate.   Pulmonary:      Effort: Pulmonary effort is normal. No respiratory distress.      Breath sounds: Normal breath sounds.   Abdominal:      Palpations: Abdomen is soft.      Tenderness: There is no abdominal tenderness.   Musculoskeletal:         General: No swelling.      Cervical back: Neck supple.   Skin:     General: Skin is warm and dry.      Capillary Refill: Capillary refill takes less than 2 seconds.   Neurological:      Mental Status: He is alert.   Psychiatric:         Mood and Affect: Mood normal.        Musculoskeletal: right lower  The entire knee is very edematous.  The leg is held in approximately 30 degrees of flexion.  No visible open wounds.  TTP over the knee  The patient is s/p amputation of all toes on right foot.    Motor intact ankle plantarflexion/dorsiflexion  Sensation decreased in foot  Extremity WWP      Lab Results: I have reviewed the following results: CBC/BMP:   .     09/17/24  1354   WBC 12.92*   HGB 12.6   HCT 37.8      SODIUM 136   K 4.3      CO2 22   BUN 28*   CREATININE 0.83   GLUC 107      Recent Labs     09/17/24  1354   WBC 12.92*   HGB 12.6   HCT 37.8      BUN 28*   CREATININE 0.83   PTT 28   INR 1.08     Blood Culture:    Lab Results   Component Value Date    BLOODCX No Growth After 5 Days. 10/10/2020    BLOODCX No Growth After 5 Days. 10/10/2020     Wound Culture:   Lab Results   Component Value Date    WOUNDCULT 2+ Growth of Morganella morganii (A) 08/30/2020    WOUNDCULT (A) 08/30/2020     2+ Growth of Methicillin Resistant Staphylococcus aureus    WOUNDCULT 1+ Growth of Enterobacter cloacae complex (A) 08/30/2020    WOUNDCULT 1+ Growth of Enterococcus faecalis  (A) 08/30/2020

## 2024-09-19 ENCOUNTER — APPOINTMENT (INPATIENT)
Dept: RADIOLOGY | Facility: HOSPITAL | Age: 59
DRG: 467 | End: 2024-09-19
Payer: MEDICARE

## 2024-09-19 ENCOUNTER — ANESTHESIA (INPATIENT)
Dept: PERIOP | Facility: HOSPITAL | Age: 59
DRG: 467 | End: 2024-09-19
Payer: MEDICARE

## 2024-09-19 PROBLEM — Z01.810 PREOPERATIVE CARDIOVASCULAR EXAMINATION: Status: ACTIVE | Noted: 2020-07-06

## 2024-09-19 LAB
GLUCOSE SERPL-MCNC: 140 MG/DL (ref 65–140)
GLUCOSE SERPL-MCNC: 141 MG/DL (ref 65–140)
GLUCOSE SERPL-MCNC: 229 MG/DL (ref 65–140)
GLUCOSE SERPL-MCNC: 303 MG/DL (ref 65–140)

## 2024-09-19 PROCEDURE — 86923 COMPATIBILITY TEST ELECTRIC: CPT

## 2024-09-19 PROCEDURE — C1776 JOINT DEVICE (IMPLANTABLE): HCPCS | Performed by: STUDENT IN AN ORGANIZED HEALTH CARE EDUCATION/TRAINING PROGRAM

## 2024-09-19 PROCEDURE — 87070 CULTURE OTHR SPECIMN AEROBIC: CPT | Performed by: STUDENT IN AN ORGANIZED HEALTH CARE EDUCATION/TRAINING PROGRAM

## 2024-09-19 PROCEDURE — 0SPC0EZ REMOVAL OF ARTICULATING SPACER FROM RIGHT KNEE JOINT, OPEN APPROACH: ICD-10-PCS | Performed by: STUDENT IN AN ORGANIZED HEALTH CARE EDUCATION/TRAINING PROGRAM

## 2024-09-19 PROCEDURE — 99222 1ST HOSP IP/OBS MODERATE 55: CPT | Performed by: HOSPITALIST

## 2024-09-19 PROCEDURE — C1713 ANCHOR/SCREW BN/BN,TIS/BN: HCPCS | Performed by: STUDENT IN AN ORGANIZED HEALTH CARE EDUCATION/TRAINING PROGRAM

## 2024-09-19 PROCEDURE — 87075 CULTR BACTERIA EXCEPT BLOOD: CPT | Performed by: STUDENT IN AN ORGANIZED HEALTH CARE EDUCATION/TRAINING PROGRAM

## 2024-09-19 PROCEDURE — 73560 X-RAY EXAM OF KNEE 1 OR 2: CPT

## 2024-09-19 PROCEDURE — 87205 SMEAR GRAM STAIN: CPT | Performed by: STUDENT IN AN ORGANIZED HEALTH CARE EDUCATION/TRAINING PROGRAM

## 2024-09-19 PROCEDURE — 87102 FUNGUS ISOLATION CULTURE: CPT | Performed by: STUDENT IN AN ORGANIZED HEALTH CARE EDUCATION/TRAINING PROGRAM

## 2024-09-19 PROCEDURE — 82948 REAGENT STRIP/BLOOD GLUCOSE: CPT

## 2024-09-19 PROCEDURE — C9290 INJ, BUPIVACAINE LIPOSOME: HCPCS | Performed by: ANESTHESIOLOGY

## 2024-09-19 PROCEDURE — 0S9C0ZZ DRAINAGE OF RIGHT KNEE JOINT, OPEN APPROACH: ICD-10-PCS | Performed by: STUDENT IN AN ORGANIZED HEALTH CARE EDUCATION/TRAINING PROGRAM

## 2024-09-19 PROCEDURE — 27487 REVISE/REPLACE KNEE JOINT: CPT | Performed by: PHYSICIAN ASSISTANT

## 2024-09-19 PROCEDURE — 87176 TISSUE HOMOGENIZATION CULTR: CPT | Performed by: STUDENT IN AN ORGANIZED HEALTH CARE EDUCATION/TRAINING PROGRAM

## 2024-09-19 PROCEDURE — 0SRC0J9 REPLACEMENT OF RIGHT KNEE JOINT WITH SYNTHETIC SUBSTITUTE, CEMENTED, OPEN APPROACH: ICD-10-PCS | Performed by: STUDENT IN AN ORGANIZED HEALTH CARE EDUCATION/TRAINING PROGRAM

## 2024-09-19 PROCEDURE — 27487 REVISE/REPLACE KNEE JOINT: CPT | Performed by: STUDENT IN AN ORGANIZED HEALTH CARE EDUCATION/TRAINING PROGRAM

## 2024-09-19 DEVICE — UNIVERSAL STEM FLUTED 75MM X 22MM: Type: IMPLANTABLE DEVICE | Site: KNEE | Status: FUNCTIONAL

## 2024-09-19 DEVICE — SMARTSET HIGH PERFORMANCE MV MEDIUM VISCOSITY BONE CEMENT 40G
Type: IMPLANTABLE DEVICE | Site: KNEE | Status: FUNCTIONAL
Brand: SMARTSET

## 2024-09-19 DEVICE — S-ROM NOILES ROTATING HINGE FEMORAL ROTATING HINGE CEMENTED RIGHT SMALL
Type: IMPLANTABLE DEVICE | Site: KNEE | Status: FUNCTIONAL
Brand: S-ROM NOILES

## 2024-09-19 DEVICE — M.B.T. REVISION METAPHYSEAL SLEEVE POROUS 37MM: Type: IMPLANTABLE DEVICE | Site: KNEE | Status: FUNCTIONAL

## 2024-09-19 DEVICE — UNIVERSAL STEM FLUTED 115MM X 18MM: Type: IMPLANTABLE DEVICE | Site: KNEE | Status: FUNCTIONAL

## 2024-09-19 DEVICE — DEPUY CMW 2 FAST SET BONE CEMENT 20G: Type: IMPLANTABLE DEVICE | Site: KNEE | Status: FUNCTIONAL

## 2024-09-19 DEVICE — TIBIAL TRAY ROTATING PLATFORM M.B.T. REVISION THICK TRAY SIZE 4 15MM CEMENTED: Type: IMPLANTABLE DEVICE | Site: KNEE | Status: FUNCTIONAL

## 2024-09-19 DEVICE — UNIVERSAL FEMORAL SLEEVE FULL POROUS 34MM: Type: IMPLANTABLE DEVICE | Site: KNEE | Status: FUNCTIONAL

## 2024-09-19 DEVICE — LPS TIBIAL INSERT HINGE UNIVERSAL SMALL 16MM
Type: IMPLANTABLE DEVICE | Site: KNEE | Status: FUNCTIONAL
Brand: LPS

## 2024-09-19 RX ORDER — ACETAMINOPHEN 325 MG/1
975 TABLET ORAL EVERY 8 HOURS
Status: DISCONTINUED | OUTPATIENT
Start: 2024-09-19 | End: 2024-09-23 | Stop reason: HOSPADM

## 2024-09-19 RX ORDER — CEFAZOLIN SODIUM 2 G/50ML
2000 SOLUTION INTRAVENOUS EVERY 8 HOURS
Status: DISCONTINUED | OUTPATIENT
Start: 2024-09-19 | End: 2024-09-23 | Stop reason: HOSPADM

## 2024-09-19 RX ORDER — PROPOFOL 10 MG/ML
INJECTION, EMULSION INTRAVENOUS AS NEEDED
Status: DISCONTINUED | OUTPATIENT
Start: 2024-09-19 | End: 2024-09-19

## 2024-09-19 RX ORDER — MAGNESIUM HYDROXIDE 1200 MG/15ML
LIQUID ORAL AS NEEDED
Status: DISCONTINUED | OUTPATIENT
Start: 2024-09-19 | End: 2024-09-19 | Stop reason: HOSPADM

## 2024-09-19 RX ORDER — CALCIUM CARBONATE 500 MG/1
1000 TABLET, CHEWABLE ORAL DAILY PRN
Status: DISCONTINUED | OUTPATIENT
Start: 2024-09-19 | End: 2024-09-23 | Stop reason: HOSPADM

## 2024-09-19 RX ORDER — FENTANYL CITRATE 50 UG/ML
INJECTION, SOLUTION INTRAMUSCULAR; INTRAVENOUS AS NEEDED
Status: DISCONTINUED | OUTPATIENT
Start: 2024-09-19 | End: 2024-09-19

## 2024-09-19 RX ORDER — DEXAMETHASONE SODIUM PHOSPHATE 10 MG/ML
INJECTION, SOLUTION INTRAMUSCULAR; INTRAVENOUS AS NEEDED
Status: DISCONTINUED | OUTPATIENT
Start: 2024-09-19 | End: 2024-09-19

## 2024-09-19 RX ORDER — DOXYCYCLINE 100 MG/1
100 CAPSULE ORAL EVERY 12 HOURS SCHEDULED
Qty: 60 CAPSULE | Refills: 3 | Status: SHIPPED | OUTPATIENT
Start: 2024-09-19 | End: 2024-10-19

## 2024-09-19 RX ORDER — SODIUM CHLORIDE, SODIUM LACTATE, POTASSIUM CHLORIDE, CALCIUM CHLORIDE 600; 310; 30; 20 MG/100ML; MG/100ML; MG/100ML; MG/100ML
125 INJECTION, SOLUTION INTRAVENOUS CONTINUOUS
Status: DISCONTINUED | OUTPATIENT
Start: 2024-09-19 | End: 2024-09-23 | Stop reason: HOSPADM

## 2024-09-19 RX ORDER — BUPIVACAINE HYDROCHLORIDE 5 MG/ML
INJECTION, SOLUTION EPIDURAL; INTRACAUDAL
Status: COMPLETED | OUTPATIENT
Start: 2024-09-19 | End: 2024-09-19

## 2024-09-19 RX ORDER — ONDANSETRON 4 MG/1
4 TABLET, ORALLY DISINTEGRATING ORAL EVERY 6 HOURS PRN
Qty: 20 TABLET | Refills: 0 | Status: SHIPPED | OUTPATIENT
Start: 2024-09-19

## 2024-09-19 RX ORDER — SODIUM CHLORIDE, SODIUM LACTATE, POTASSIUM CHLORIDE, CALCIUM CHLORIDE 600; 310; 30; 20 MG/100ML; MG/100ML; MG/100ML; MG/100ML
100 INJECTION, SOLUTION INTRAVENOUS CONTINUOUS
Status: DISCONTINUED | OUTPATIENT
Start: 2024-09-19 | End: 2024-09-23 | Stop reason: HOSPADM

## 2024-09-19 RX ORDER — HYDROMORPHONE HCL/PF 1 MG/ML
0.5 SYRINGE (ML) INJECTION EVERY 2 HOUR PRN
Status: DISPENSED | OUTPATIENT
Start: 2024-09-19 | End: 2024-09-21

## 2024-09-19 RX ORDER — TRANEXAMIC ACID 10 MG/ML
1000 INJECTION, SOLUTION INTRAVENOUS
Status: COMPLETED | OUTPATIENT
Start: 2024-09-19 | End: 2024-09-19

## 2024-09-19 RX ORDER — CEFAZOLIN SODIUM 2 G/50ML
2000 SOLUTION INTRAVENOUS
Status: COMPLETED | OUTPATIENT
Start: 2024-09-19 | End: 2024-09-19

## 2024-09-19 RX ORDER — AMOXICILLIN 250 MG
1 CAPSULE ORAL DAILY
Qty: 30 TABLET | Refills: 0 | Status: SHIPPED | OUTPATIENT
Start: 2024-09-19

## 2024-09-19 RX ORDER — ALBUMIN, HUMAN INJ 5% 5 %
SOLUTION INTRAVENOUS CONTINUOUS PRN
Status: DISCONTINUED | OUTPATIENT
Start: 2024-09-19 | End: 2024-09-19

## 2024-09-19 RX ORDER — DOCUSATE SODIUM 100 MG/1
100 CAPSULE, LIQUID FILLED ORAL 2 TIMES DAILY
Status: DISCONTINUED | OUTPATIENT
Start: 2024-09-19 | End: 2024-09-23 | Stop reason: HOSPADM

## 2024-09-19 RX ORDER — ONDANSETRON 2 MG/ML
4 INJECTION INTRAMUSCULAR; INTRAVENOUS ONCE AS NEEDED
Status: DISCONTINUED | OUTPATIENT
Start: 2024-09-19 | End: 2024-09-19 | Stop reason: HOSPADM

## 2024-09-19 RX ORDER — GABAPENTIN 300 MG/1
300 CAPSULE ORAL
Status: DISCONTINUED | OUTPATIENT
Start: 2024-09-19 | End: 2024-09-23 | Stop reason: HOSPADM

## 2024-09-19 RX ORDER — ONDANSETRON 2 MG/ML
INJECTION INTRAMUSCULAR; INTRAVENOUS AS NEEDED
Status: DISCONTINUED | OUTPATIENT
Start: 2024-09-19 | End: 2024-09-19

## 2024-09-19 RX ORDER — CHLORHEXIDINE GLUCONATE ORAL RINSE 1.2 MG/ML
15 SOLUTION DENTAL ONCE
Status: DISCONTINUED | OUTPATIENT
Start: 2024-09-19 | End: 2024-09-19 | Stop reason: HOSPADM

## 2024-09-19 RX ORDER — EPHEDRINE SULFATE 50 MG/ML
INJECTION INTRAVENOUS AS NEEDED
Status: DISCONTINUED | OUTPATIENT
Start: 2024-09-19 | End: 2024-09-19

## 2024-09-19 RX ORDER — BUPIVACAINE HYDROCHLORIDE AND EPINEPHRINE 2.5; 5 MG/ML; UG/ML
INJECTION, SOLUTION EPIDURAL; INFILTRATION; INTRACAUDAL; PERINEURAL AS NEEDED
Status: DISCONTINUED | OUTPATIENT
Start: 2024-09-19 | End: 2024-09-19 | Stop reason: HOSPADM

## 2024-09-19 RX ORDER — FOLIC ACID 1 MG/1
1 TABLET ORAL DAILY
Status: DISCONTINUED | OUTPATIENT
Start: 2024-09-20 | End: 2024-09-23 | Stop reason: HOSPADM

## 2024-09-19 RX ORDER — FENTANYL CITRATE/PF 50 MCG/ML
50 SYRINGE (ML) INJECTION
Status: DISCONTINUED | OUTPATIENT
Start: 2024-09-19 | End: 2024-09-19 | Stop reason: HOSPADM

## 2024-09-19 RX ORDER — PANTOPRAZOLE SODIUM 40 MG/1
40 TABLET, DELAYED RELEASE ORAL
Status: DISCONTINUED | OUTPATIENT
Start: 2024-09-20 | End: 2024-09-23 | Stop reason: HOSPADM

## 2024-09-19 RX ORDER — ACETAMINOPHEN 500 MG
1000 TABLET ORAL EVERY 8 HOURS
Qty: 60 TABLET | Refills: 0 | Status: SHIPPED | OUTPATIENT
Start: 2024-09-19

## 2024-09-19 RX ORDER — INSULIN LISPRO 100 [IU]/ML
1-6 INJECTION, SOLUTION INTRAVENOUS; SUBCUTANEOUS
Status: DISCONTINUED | OUTPATIENT
Start: 2024-09-19 | End: 2024-09-23 | Stop reason: HOSPADM

## 2024-09-19 RX ORDER — MIDAZOLAM HYDROCHLORIDE 2 MG/2ML
INJECTION, SOLUTION INTRAMUSCULAR; INTRAVENOUS AS NEEDED
Status: DISCONTINUED | OUTPATIENT
Start: 2024-09-19 | End: 2024-09-19

## 2024-09-19 RX ORDER — LIDOCAINE HYDROCHLORIDE 20 MG/ML
INJECTION, SOLUTION EPIDURAL; INFILTRATION; INTRACAUDAL; PERINEURAL AS NEEDED
Status: DISCONTINUED | OUTPATIENT
Start: 2024-09-19 | End: 2024-09-19

## 2024-09-19 RX ORDER — ACETAMINOPHEN 325 MG/1
975 TABLET ORAL ONCE
Status: DISCONTINUED | OUTPATIENT
Start: 2024-09-19 | End: 2024-09-19

## 2024-09-19 RX ORDER — SENNOSIDES 8.6 MG
1 TABLET ORAL DAILY
Status: DISCONTINUED | OUTPATIENT
Start: 2024-09-20 | End: 2024-09-23 | Stop reason: HOSPADM

## 2024-09-19 RX ORDER — ROCURONIUM BROMIDE 10 MG/ML
INJECTION, SOLUTION INTRAVENOUS AS NEEDED
Status: DISCONTINUED | OUTPATIENT
Start: 2024-09-19 | End: 2024-09-19

## 2024-09-19 RX ORDER — ONDANSETRON 2 MG/ML
4 INJECTION INTRAMUSCULAR; INTRAVENOUS EVERY 6 HOURS PRN
Status: DISCONTINUED | OUTPATIENT
Start: 2024-09-19 | End: 2024-09-23 | Stop reason: HOSPADM

## 2024-09-19 RX ORDER — HYDROMORPHONE HCL/PF 1 MG/ML
0.5 SYRINGE (ML) INJECTION
Status: DISCONTINUED | OUTPATIENT
Start: 2024-09-19 | End: 2024-09-19 | Stop reason: HOSPADM

## 2024-09-19 RX ORDER — ASCORBIC ACID 500 MG
500 TABLET ORAL 2 TIMES DAILY
Status: DISCONTINUED | OUTPATIENT
Start: 2024-09-19 | End: 2024-09-23 | Stop reason: HOSPADM

## 2024-09-19 RX ORDER — SIMETHICONE 80 MG
80 TABLET,CHEWABLE ORAL 4 TIMES DAILY PRN
Status: DISCONTINUED | OUTPATIENT
Start: 2024-09-19 | End: 2024-09-23 | Stop reason: HOSPADM

## 2024-09-19 RX ADMIN — ACETAMINOPHEN 325MG 975 MG: 325 TABLET ORAL at 19:38

## 2024-09-19 RX ADMIN — LIDOCAINE HYDROCHLORIDE 50 MG: 20 INJECTION, SOLUTION EPIDURAL; INFILTRATION; INTRACAUDAL at 13:36

## 2024-09-19 RX ADMIN — ROCURONIUM BROMIDE 20 MG: 10 INJECTION, SOLUTION INTRAVENOUS at 14:38

## 2024-09-19 RX ADMIN — ASPIRIN 81 MG: 81 TABLET, COATED ORAL at 21:50

## 2024-09-19 RX ADMIN — CEFAZOLIN SODIUM 2000 MG: 2 SOLUTION INTRAVENOUS at 13:42

## 2024-09-19 RX ADMIN — FENTANYL CITRATE 50 MCG: 50 INJECTION, SOLUTION INTRAMUSCULAR; INTRAVENOUS at 17:33

## 2024-09-19 RX ADMIN — PROPOFOL 200 MG: 10 INJECTION, EMULSION INTRAVENOUS at 13:36

## 2024-09-19 RX ADMIN — DEXAMETHASONE SODIUM PHOSPHATE 5 MG: 10 INJECTION INTRAMUSCULAR; INTRAVENOUS at 13:42

## 2024-09-19 RX ADMIN — OXYCODONE HYDROCHLORIDE AND ACETAMINOPHEN 500 MG: 500 TABLET ORAL at 19:40

## 2024-09-19 RX ADMIN — FENTANYL CITRATE 50 MCG: 50 INJECTION INTRAMUSCULAR; INTRAVENOUS at 16:26

## 2024-09-19 RX ADMIN — SODIUM CHLORIDE, SODIUM LACTATE, POTASSIUM CHLORIDE, AND CALCIUM CHLORIDE 125 ML/HR: .6; .31; .03; .02 INJECTION, SOLUTION INTRAVENOUS at 13:13

## 2024-09-19 RX ADMIN — SUGAMMADEX 200 MG: 100 INJECTION, SOLUTION INTRAVENOUS at 16:25

## 2024-09-19 RX ADMIN — INSULIN LISPRO 9 UNITS: 100 INJECTION, SOLUTION INTRAVENOUS; SUBCUTANEOUS at 21:51

## 2024-09-19 RX ADMIN — SODIUM CHLORIDE, SODIUM LACTATE, POTASSIUM CHLORIDE, AND CALCIUM CHLORIDE: .6; .31; .03; .02 INJECTION, SOLUTION INTRAVENOUS at 16:18

## 2024-09-19 RX ADMIN — PANTOPRAZOLE SODIUM 40 MG: 40 TABLET, DELAYED RELEASE ORAL at 05:30

## 2024-09-19 RX ADMIN — INSULIN HUMAN 4 UNITS: 100 INJECTION, SOLUTION PARENTERAL at 17:39

## 2024-09-19 RX ADMIN — ONDANSETRON 4 MG: 2 INJECTION INTRAMUSCULAR; INTRAVENOUS at 16:49

## 2024-09-19 RX ADMIN — ROCURONIUM BROMIDE 10 MG: 10 INJECTION, SOLUTION INTRAVENOUS at 15:25

## 2024-09-19 RX ADMIN — INSULIN GLARGINE 15 UNITS: 100 INJECTION, SOLUTION SUBCUTANEOUS at 21:51

## 2024-09-19 RX ADMIN — SODIUM CHLORIDE, SODIUM LACTATE, POTASSIUM CHLORIDE, AND CALCIUM CHLORIDE 100 ML/HR: .6; .31; .03; .02 INJECTION, SOLUTION INTRAVENOUS at 18:12

## 2024-09-19 RX ADMIN — ALBUMIN (HUMAN): 12.5 INJECTION, SOLUTION INTRAVENOUS at 14:57

## 2024-09-19 RX ADMIN — BUPIVACAINE HYDROCHLORIDE 10 ML: 5 INJECTION, SOLUTION EPIDURAL; INTRACAUDAL; PERINEURAL at 13:22

## 2024-09-19 RX ADMIN — TRANEXAMIC ACID 1000 MG: 10 INJECTION, SOLUTION INTRAVENOUS at 13:42

## 2024-09-19 RX ADMIN — ROCURONIUM BROMIDE 50 MG: 10 INJECTION, SOLUTION INTRAVENOUS at 13:36

## 2024-09-19 RX ADMIN — CEFAZOLIN SODIUM 2000 MG: 2 SOLUTION INTRAVENOUS at 21:39

## 2024-09-19 RX ADMIN — FENTANYL CITRATE 50 MCG: 50 INJECTION, SOLUTION INTRAMUSCULAR; INTRAVENOUS at 16:58

## 2024-09-19 RX ADMIN — ROCURONIUM BROMIDE 20 MG: 10 INJECTION, SOLUTION INTRAVENOUS at 16:06

## 2024-09-19 RX ADMIN — BUPIVACAINE 10 ML: 13.3 INJECTION, SUSPENSION, LIPOSOMAL INFILTRATION at 13:22

## 2024-09-19 RX ADMIN — LEVOTHYROXINE SODIUM 175 MCG: 125 TABLET ORAL at 05:30

## 2024-09-19 RX ADMIN — EPHEDRINE SULFATE 10 MG: 50 INJECTION INTRAVENOUS at 13:58

## 2024-09-19 RX ADMIN — ONDANSETRON 4 MG: 2 INJECTION INTRAMUSCULAR; INTRAVENOUS at 13:42

## 2024-09-19 RX ADMIN — SODIUM CHLORIDE, SODIUM LACTATE, POTASSIUM CHLORIDE, AND CALCIUM CHLORIDE: .6; .31; .03; .02 INJECTION, SOLUTION INTRAVENOUS at 14:18

## 2024-09-19 RX ADMIN — HYDROMORPHONE HYDROCHLORIDE 4 MG: 2 TABLET ORAL at 21:49

## 2024-09-19 RX ADMIN — MIDAZOLAM 2 MG: 1 INJECTION INTRAMUSCULAR; INTRAVENOUS at 13:20

## 2024-09-19 RX ADMIN — FENTANYL CITRATE 50 MCG: 50 INJECTION INTRAMUSCULAR; INTRAVENOUS at 16:30

## 2024-09-19 RX ADMIN — PHENYLEPHRINE HYDROCHLORIDE 30 MCG/MIN: 10 INJECTION INTRAVENOUS at 14:20

## 2024-09-19 NOTE — ANESTHESIA PROCEDURE NOTES
Peripheral Block    Patient location during procedure: holding area  Start time: 9/19/2024 1:22 PM  Reason for block: at surgeon's request and post-op pain management  Staffing  Performed by: Farrah Mcgill MD  Authorized by: Farrah Mcgill MD    Preanesthetic Checklist  Completed: patient identified, IV checked, site marked, risks and benefits discussed, surgical consent, monitors and equipment checked, pre-op evaluation and timeout performed  Peripheral Block  Prep: ChloraPrep  Patient monitoring: frequent blood pressure checks, continuous pulse oximetry and heart rate  Block type: Adductor Canal  Laterality: right  Injection technique: single-shot  Procedures: ultrasound guided, Ultrasound guidance required for the procedure to increase accuracy and safety of medication placement and decrease risk of complications.  Ultrasound permanent image saved  bupivacaine (PF) (MARCAINE) 0.5 % injection 20 mL - Perineural   10 mL - 9/19/2024 1:22:00 PM  bupivacaine liposomal (EXPAREL) 1.3 % injection 20 mL - Perineural   10 mL - 9/19/2024 1:22:00 PM  Needle  Needle type: Stimuplex   Needle length: 4 in  Needle localization: anatomical landmarks and ultrasound guidance  Assessment  Injection assessment: incremental injection, frequent aspiration, injected with ease, negative aspiration, negative for heart rate change, no paresthesia on injection, no symptoms of intraneural/intravenous injection and needle tip visualized at all times  Paresthesia pain: none  Post-procedure:  site cleaned  patient tolerated the procedure well with no immediate complications

## 2024-09-19 NOTE — DISCHARGE INSTR - AVS FIRST PAGE
Dr. Hendrix Knee Replacement    What to Expect/Activity  It is normal to have some discomfort in your knee for several days to weeks.  You are weight bearing as tolerated to your operative leg with assist devices.  Please use crutches/walker when ambulating until your follow-up  Swelling and discomfort in the knee is normal for several days after surgery. For the first 2-3 days, use ice around the knee to help. Use for 20-30 minutes every 1-2 hours for 48 hours, while awake. You may continue beyond 48 hours as needed.  Place one or two pillows underneath your calf, not your knee, to reduce swelling.  Physical therapy on your own at home should start as soon as possible (see below). Please perform heel slides and extension exercises on your own as well (see diagram).  Please use incentive spirometer 10 times per hour while awake (see diagram).    Dressing/Wound Care/Bathing  You may remove your toe-to-groin dressing 24 hours after surgery. There will be a surgical dressing over your incision that stays in place until follow-up unless water gets under the bandage and then it should be removed.   You may start showering 24 hours after surgery, the surgical dressing will remain in place. Please pat the dressing dry. If you notice the dressing appears saturated or is starting to come off, please replace with dry dressing.  You can keep the dressing in place until follow-up in the office.   Do not place any creams, ointments or gels on or around the incision.  No baths, swimming or submerging until cleared by Dr. Hendrix    Pain Management/Medications  You may resume your usual medications.  Please take the following medications:  Anti-coagulation (blood clot prevention) - aspirin 81mg twice daily for 4 weeks  Pain medication:  Narcotic: Take as directed  NSAID/Anti-inflammatory: Take as directed  Tylenol 1000mg every 8 hours  Zofran (ondasetron) - 4mg every 8 hours as needed for nausea  Stool softeners (senna/colace) -  take daily to prevent constipation as narcotic pain medication causes constipation  Antibiotic - take as directed if prescribed   If you have questions or pain concerns, please contact the office. Pain medication cannot remove all post-operative pain.    Follow up/Call if:  The findings of your surgery will be explained to you and your family immediately after surgery. However, in the post-operative period, during recovery from anesthesia you may not fully remember or fully understand what was said. This will be again gone over when you return for your post-op appointment.  Please contact Dr. Hendrix's office if you experience the following:  Excessive bleeding (bleeding through your dressing)  Fever greater than 101 degrees F after 48 hours (low grade fevers the day or two after surgery are normal)  Persistent nausea or vomiting  Decreased sensation or discoloration of the operative limb  Pain or swelling that is getting worse and not better with medication    Dr. Hendrix's Office Contact: [phone]

## 2024-09-19 NOTE — ASSESSMENT & PLAN NOTE
Lab Results   Component Value Date    HGBA1C 6.2 (H) 07/01/2024       Recent Labs     09/19/24  2126 09/20/24  0624 09/20/24  1109 09/20/24  1607   POCGLU 303* 252* 225* 192*       Blood Sugar Average: Last 72 hrs:  (P) [id number]    Blood sugars are coming under control

## 2024-09-19 NOTE — ANESTHESIA POSTPROCEDURE EVALUATION
Post-Op Assessment Note    CV Status:  Stable  Pain Score: 0    Pain management: adequate       Mental Status:  Alert and awake   Hydration Status:  Euvolemic   PONV Controlled:  Controlled   Airway Patency:  Patent and adequate  Two or more mitigation strategies used for obstructive sleep apnea   Post Op Vitals Reviewed: Yes    No anethesia notable event occurred.    Staff: CRNA               BP   111/59   Temp 96.5   Pulse 89   Resp 2   SpO2 98

## 2024-09-19 NOTE — PLAN OF CARE
Problem: Potential for Falls  Goal: Patient will remain free of falls  Description: INTERVENTIONS:  - Educate patient/family on patient safety including physical limitations  - Instruct patient to call for assistance with activity   - Consult OT/PT to assist with strengthening/mobility   - Keep Call bell within reach  - Keep bed low and locked with side rails adjusted as appropriate  - Keep care items and personal belongings within reach  - Initiate and maintain comfort rounds  - Make Fall Risk Sign visible to staff  - Offer Toileting every 2 Hours, in advance of need  - Initiate/Maintain bed alarm  - Obtain necessary fall risk management equipment: wheelchair  - Apply yellow socks and bracelet for high fall risk patients  - Consider moving patient to room near nurses station  Outcome: Progressing     Problem: PAIN - ADULT  Goal: Verbalizes/displays adequate comfort level or baseline comfort level  Description: Interventions:  - Encourage patient to monitor pain and request assistance  - Assess pain using appropriate pain scale  - Administer analgesics based on type and severity of pain and evaluate response  - Implement non-pharmacological measures as appropriate and evaluate response  - Consider cultural and social influences on pain and pain management  - Notify physician/advanced practitioner if interventions unsuccessful or patient reports new pain  Outcome: Progressing     Problem: SAFETY ADULT  Goal: Patient will remain free of falls  Description: INTERVENTIONS:  - Educate patient/family on patient safety including physical limitations  - Instruct patient to call for assistance with activity   - Consult OT/PT to assist with strengthening/mobility   - Keep Call bell within reach  - Keep bed low and locked with side rails adjusted as appropriate  - Keep care items and personal belongings within reach  - Initiate and maintain comfort rounds  - Make Fall Risk Sign visible to staff  - Offer Toileting every 2  Hours, in advance of need  - Initiate/Maintain bed alarm  - Obtain necessary fall risk management equipment: wheelchair  - Apply yellow socks and bracelet for high fall risk patients  - Consider moving patient to room near nurses station  Outcome: Progressing

## 2024-09-19 NOTE — OP NOTE
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]    Procedure(s):  Right - ARTHROPLASTY KNEE TOTAL REVISION    Specimens:  ID Type Source Tests Collected by Time Destination   A : Right knee culture #1 Tissue Joint, Right Knee ANAEROBIC CULTURE AND GRAM STAIN, FUNGAL CULTURE, CULTURE, TISSUE AND GRAM STAIN Jorge Hendrix, DO 2024 1418    B : Right knee culture #2 Tissue Joint, Right Knee ANAEROBIC CULTURE AND GRAM STAIN, FUNGAL CULTURE, CULTURE, TISSUE AND GRAM STAIN Jorge Hendrix, DO 2024 1418    C : Right knee culture #3 Tissue Joint, Right Knee ANAEROBIC CULTURE AND GRAM STAIN, FUNGAL CULTURE, CULTURE, TISSUE AND GRAM STAIN Jorge Hendrix, DO 2024 1418        Estimated Blood Loss:   400 mL    Drains:  Urethral Catheter Latex 16 Fr. 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 Patient had a fall a little over a week ago. He had deformity of the knee at that time and returned himself to his wheelchair. He presented to the office 2 days ago and x-rays demonstrated fracture of his proximal tibia with posterior dislocation and loosening of his tibial component. We sent him directly to the hospital for admission and imaging. A CAT scan was obtained which shows fracture of the small amount of  remaining posterior medial tibial plateau and posterior dislocation of his tibial component. The patient finished his course of IV antibiotics and has been on doxycycline at the direction of infectious disease for prolonged antibiotic course. He had a culture-negative infection previously. The current antibiotics would raymundo any aspiration results. Due to his catastrophic failure he is indicated for conversion to hinged total knee arthroplasty. We discussed that he has massive proximal tibial bone loss and the plan is for likely built-up baseplate with sleeve or cone fixation within the tibia. There is the chance that he will require proximal tibial replacement. We also discussed that there is a role for spanning knee fusion if his bone loss is too significant for arthroplasty. We discussed risks which include but are not limited to fracture, infection, damage to surrounding structures, damage to popliteal stent, loosening, instability, continued pain, need for further surgery, blood clots, stroke, heart attack, loss of limb or life.     Operative Findings:  Posteromedial tibial fracture with posterior dislocation of tibial component  Well-fixed femoral and patellar components     Implant Name Type Inv. Item Serial No.  Lot No. LRB No. Used Action   DEPUY BONE CEMENT CMW2 - [id number]  DEPUY BONE CEMENT CMW2  DEPUY [id number] Right 1 Implanted   CEMENT BONE SMART SET [employer] - [id number]  CEMENT BONE SMART SET [employer]  DEPUY [id number] Right 1 Implanted   CEMENT BONE SMART SET [employer] - [id number]  CEMENT BONE SMART SET [employer]  DEPUY [id number] Right 1 Implanted   CEMENT BONE SMART SET [employer] - [id number]  CEMENT BONE SMART SET [employer]  DEPUY [id number] Right 1 Implanted   INSERT TIB SZ 7 10MM CR ATTUNE ALPOLY - [id number]  INSERT TIB SZ 7 10MM CR ATTUNE ALPOLY  DEPUY [id number] Right 1 Explanted   COMPONENT FEM SZ 8 RT CMNT CR ATTUNE - [id number]  COMPONENT FEM SZ 8 RT CMNT CR  ATTUNE  DEPUY [id number] Right 1 Explanted   KNEE SYSTEM REV POST FEM AUG SZ 8 8MM ATTUNE - [id number]  KNEE SYSTEM REV POST FEM AUG SZ 8 8MM ATTUNE  DEPUY [id number] Right 1 Explanted   KNEE SYSTEM REV POST FEM AUG SZ 8 12MM ATTUNE - [id number]  KNEE SYSTEM REV POST FEM AUG SZ 8 12MM ATTUNE  DEPUY [id number] Right 1 Explanted   KNEE SYSTEM REV AUG TIB 15MM RM/LL SZ 7/8 CMNT ATTUNE - [id number]  KNEE SYSTEM REV AUG TIB 15MM RM/LL SZ 7/8 CMNT ATTUNE  DEPUY [id number] Right 1 Explanted   INSERT TIBIAL 16MM SM LPS - [id number]  INSERT TIBIAL 16MM SM LPS  DEPUY [id number] Right 1 Implanted   COMPONENT FEMORAL CMNT SM 66MM RT - [id number]  COMPONENT FEMORAL CMNT SM 66MM RT  DEPUY [id number] Right 1 Implanted   SLEEVE TIBIAL 37MM MBT REV - [id number]  SLEEVE TIBIAL 37MM MBT REV  DEPUY [id number] Right 1 Implanted   STEM UNIV FLUTED 115 X 18MM - [id number]  STEM UNIV FLUTED 115 X 18MM  DEPUY [id number] Right 1 Implanted   SLEEVE FEM 34MM UNV FLLY POR - [id number]  SLEEVE FEM 34MM UNV FLLY POR  DEPUY [id number] Right 1 Implanted   STEM UNIV FLUTED 75 X 22MM - [id number]  STEM UNIV FLUTED 75 X 22MM  DEPUY [id number] Right 1 Implanted   TIBIAL TRAY REV 15MM SZ 4 MBT - [id number]  TIBIAL TRAY REV 15MM SZ 4 MBT  DEPUY [id number] Right 1 Implanted   STEP WEDGE 10MM CEMENTED    DEPUY [id number] Right 1 Implanted     Complications:   None    Knee Technique: Suture (direct) Repair  Knee Approach: Medial Parapatellar    Chronic Narcotic Use:  No      Procedure and Technique:  Patient was seen in the preoperative holding area.  Informed consent was confirmed and all questions were answered. Operative site was confirmed and marked. Patient was taken to the operating room and transferred to the operating room table. General anesthesia was performed. The patient was then placed supine and all bony prominences were well-padded. Right lower extremity was prepped and draped in usual sterile fashion with chlorhexidine scrub.  Patient was given perioperative antibiotics prior to  incision and SCDs were placed on the non-operative leg.  A formal time-out was performed identifying the patient and confirmed operative site.  The patient's previous anterior knee incision was utilized down to the level of the extensor mechanism.  A medial flap was created along with a small lateral flap.  A medial parapatellar approach was utilized to enter the knee.  Upon performing the arthrotomy there was an effusion of bloody synovial fluid, no purulence. We performed a medial peel and extensive incision and drainage of the knee down to and including bone.  His tibial component was dislocated posteriorly into his knee.  This time we reduced the tibial component.  We next used a combination of osteotomes and the lucía set to loosen the femur.  The femur was removed without complication.  We next removed the tibial component with intact medial augment from the knee.    This time we carefully placed retractors around the knee.  We evaluated his proximal tibia.  He had a fracture of the posterior medial tibia that extended down approximately 1 cm below his previous cut surface.  At this time we freshened up the cut of his lateral tibia as we planned for a built-up baseplate due to his medial defect.  We were careful to leave his tubercle intact as he has a competent extensor mechanism.  At this time we sequentially reamed the tibia up to a size 18 for a 115 mm stem with excellent chatter and bone within the fluids.  We next sequentially broached for a tibial sleeve up to a size 37 with excellent rotational stability.  We next placed the size 2 tibia and punched for the keel.  We then placed the size 4 15 mm built up baseplate on top of the tibial trial.  The medial tibia also required a 10 mm augment due to the medial bone loss.  We decided to cement a 10 mm augment onto the baseplate as this will preserve intact lateral bone.     At this time we wong our attention back to the femur.  We reamed up to a size 22  with excellent chatter and bone within the fluid to the reamer.  We next sequentially broached the femur up to a size 34 femoral sleeve with excellent rotational stability.  At this time we placed the guide mann and the chamfer cut block using the transepicondylar axis to set our rotation.  We carefully resected the chamfers.  We next placed the small S-ROM distal femur and impacted onto the sleeve.  We trialed the knee with size 12 up to a size 16 polyethylene with excellent stability and restoration of the joint line with the landmarks of the fibular head, patella and meniscal scar.  At this time we took fluoroscopic images to assure construct was appropriate.    At this time all trials were removed from the knee.  We performed an extensive excisional debridement of all devitalized tissue from inside of the knee. Three soft tissue samples were sent from around the knee for culture.  At this time we irrigated the knee with Irrisept solution and allowed to sit for 3 minutes.  We assembled the tibial component on the back table.  We cemented a 10 mm augment onto the medial aspect of the baseplate and clamped this in place until the cement cured.  While the cement was curing we irrigated the knee with normal saline solution.  We impacted the femoral component together at this time as well.  We inflated the tourniquet to cement the implants.  We placed cement around the bottom of the tibial component being careful to avoid the tibial sleeve.  We impacted this in the place and removed excess cement.  We next placed cement around the distal femur being careful to avoid press-fit sleeve and stem.  We impacted this into place and removed all peripheral cement.  We placed a 16mm trial and held the knee in extension until the cement cured.  At this time we took the knee through motion found to have excellent stability with a 16mm trial.  This time we removed the trial and placed the 16 mm tibial component into the baseplate.   We reduced the knee and placed the axle from medial to lateral locking into place.  We took the knee through 1 final range of motion and there was excellent stability with great patellar tracking.    At this time we irrigated the knee with the remainder of the normal saline solution.  We then irrigated the knee with Biasurge solution.  We closed the medial arthrotomy with interrupted 1 Vicryl sutures followed by #1 STRATAFIX in flexion.  We closed the subcutaneous tissues with 2-0 Vicryl suture.  We closed the skin with 2-0 nylon sutures in horizontal mattress fashion.  A sterile Mepilex was placed along with a thigh foot Ace wrap.  Patient was awoken from anesthesia and taken to recovery room in stable condition.        59M s/p R TKA revision for catastrophic failure with tibial fracture and posterior dislocation of antibiotic tibial component   - multi-modal pain control  - ancef q8 hrs pending cultures then resume doxycycline   - DVT ppx: aspirin 81mg BID x 4 weeks  - PT/OT  - WBAT  - ROM as tolerated, pillow/blankets under achilles not behind knee while in bed  - f/u I/O cultures  - f/u 10-14 days       I was present for the entire procedure, A qualified resident physician was not available and A physician assistant was required during the procedure for retraction tissue handling,dissection and suturing     Patient Disposition:  PACU        SIGNATURE: Jorge Hendrix DO  DATE: September 19, 2024  TIME: 4:47 PM

## 2024-09-19 NOTE — ASSESSMENT & PLAN NOTE
Lab Results   Component Value Date    EGFR 95 09/20/2024    EGFR 94 09/17/2024    EGFR 100 08/12/2024    CREATININE 0.82 09/20/2024    CREATININE 0.83 09/17/2024    CREATININE 0.80 08/12/2024   Cr is at baseline

## 2024-09-19 NOTE — ASSESSMENT & PLAN NOTE
Patient never had any heart issues.  No history of heart attack.  No history of congestive heart failure.    He states that last week he did do strenuous activity at physical therapy.  He did 10 minutes on an exercise bike with arm movements.    Due to his knee he cannot walk up a flight of stairs.    During this heavy activity at physical therapy he had no chest pain.  No dyspnea on exertion.  No shortness of breath.    This likely was greater than 4 METS without any cardiac issues    He is okay to go to surgery without any further cardiac workup.  Risk is due to anesthesia and the procedure itself.  Patient is accepting of these risks and wishes to proceed.  Risk would be less if spinal anesthesia was used, but it may not be possible depending on how complicated this procedure is.  Patient understands that as well and is accepting of all risks.

## 2024-09-19 NOTE — PLAN OF CARE
Problem: Potential for Falls  Goal: Patient will remain free of falls  Description: INTERVENTIONS:  - Educate patient/family on patient safety including physical limitations  - Instruct patient to call for assistance with activity   - Consult OT/PT to assist with strengthening/mobility   - Keep Call bell within reach  - Keep bed low and locked with side rails adjusted as appropriate  - Keep care items and personal belongings within reach  - Initiate and maintain comfort rounds  - Make Fall Risk Sign visible to staff  - Offer Toileting every 2-4 Hours, in advance of need  - Initiate/Maintain bed alarm  - Obtain necessary fall risk management equipment: nonskid socks   - Apply yellow socks and bracelet for high fall risk patients  - Consider moving patient to room near nurses station  Outcome: Progressing     Problem: PAIN - ADULT  Goal: Verbalizes/displays adequate comfort level or baseline comfort level  Description: Interventions:  - Encourage patient to monitor pain and request assistance  - Assess pain using appropriate pain scale  - Administer analgesics based on type and severity of pain and evaluate response  - Implement non-pharmacological measures as appropriate and evaluate response  - Consider cultural and social influences on pain and pain management  - Notify physician/advanced practitioner if interventions unsuccessful or patient reports new pain  Outcome: Progressing     Problem: INFECTION - ADULT  Goal: Absence or prevention of progression during hospitalization  Description: INTERVENTIONS:  - Assess and monitor for signs and symptoms of infection  - Monitor lab/diagnostic results  - Monitor all insertion sites, i.e. indwelling lines, tubes, and drains  - Monitor endotracheal if appropriate and nasal secretions for changes in amount and color  - [place] appropriate cooling/warming therapies per order  - Administer medications as ordered  - Instruct and encourage patient and family to use good hand  hygiene technique  - Identify and instruct in appropriate isolation precautions for identified infection/condition  Outcome: Progressing  Goal: Absence of fever/infection during neutropenic period  Description: INTERVENTIONS:  - Monitor WBC    Outcome: Progressing     Problem: SAFETY ADULT  Goal: Patient will remain free of falls  Description: INTERVENTIONS:  - Educate patient/family on patient safety including physical limitations  - Instruct patient to call for assistance with activity   - Consult OT/PT to assist with strengthening/mobility   - Keep Call bell within reach  - Keep bed low and locked with side rails adjusted as appropriate  - Keep care items and personal belongings within reach  - Initiate and maintain comfort rounds  - Make Fall Risk Sign visible to staff  - Offer Toileting every 3 Hours, in advance of need  - Initiate/Maintain bed alarm  - Obtain necessary fall risk management equipment: nonskid socks           - Apply yellow socks and bracelet for high fall risk patients  - Consider moving patient to room near nurses station  Outcome: Progressing  Goal: Maintain or return to baseline ADL function  Description: INTERVENTIONS:  -  Assess patient's ability to carry out ADLs; assess patient's baseline for ADL function and identify physical deficits which impact ability to perform ADLs (bathing, care of mouth/teeth, toileting, grooming, dressing, etc.)  - Assess/evaluate cause of self-care deficits   - Assess range of motion  - Assess patient's mobility; develop plan if impaired  - Assess patient's need for assistive devices and provide as appropriate  - Encourage maximum independence but intervene and supervise when necessary  - Involve family in performance of ADLs  - Assess for home care needs following discharge   - Consider OT consult to assist with ADL evaluation and planning for discharge  - Provide patient education as appropriate  Outcome: Progressing  Goal: Maintains/Returns to pre admission  functional level  Description: INTERVENTIONS:  - Perform AM-PAC 6 Click Basic Mobility/ Daily Activity assessment daily.  - Set and communicate daily mobility goal to care team and patient/family/caregiver.   - Collaborate with rehabilitation services on mobility goals if consulted  - Perform Range of Motion 3 times a day.  - Reposition patient every 3 hours.  - Dangle patient 3 times a day  - Stand patient 3 times a day  - Ambulate patient 3 times a day  - Out of bed to chair 3 times a day   - Out of bed for meals 3 times a day  - Out of bed for toileting  - Record patient progress and toleration of activity level   Outcome: Progressing     Problem: DISCHARGE PLANNING  Goal: Discharge to home or other facility with appropriate resources  Description: INTERVENTIONS:  - Identify barriers to discharge w/patient and caregiver  - Arrange for needed discharge resources and transportation as appropriate  - Identify discharge learning needs (meds, wound care, etc.)  - Arrange for interpretive services to assist at discharge as needed  - Refer to Case Management Department for coordinating discharge planning if the patient needs post-hospital services based on physician/advanced practitioner order or complex needs related to functional status, cognitive ability, or social support system  Outcome: Progressing     Problem: Knowledge Deficit  Goal: Patient/family/caregiver demonstrates understanding of disease process, treatment plan, medications, and discharge instructions  Description: Complete learning assessment and assess knowledge base.  Interventions:  - Provide teaching at level of understanding  - Provide teaching via preferred learning methods  Outcome: Progressing     Problem: Prexisting or High Potential for Compromised Skin Integrity  Goal: Skin integrity is maintained or improved  Description: INTERVENTIONS:  - Identify patients at risk for skin breakdown  - Assess and monitor skin integrity  - Assess and monitor  nutrition and hydration status  - Monitor labs   - Assess for incontinence   - Turn and reposition patient  - Assist with mobility/ambulation  - Relieve pressure over bony prominences  - Avoid friction and shearing  - Provide appropriate hygiene as needed including keeping skin clean and dry  - Evaluate need for skin moisturizer/barrier cream  - Collaborate with interdisciplinary team   - Patient/family teaching  - Consider wound care consult   Outcome: Progressing

## 2024-09-19 NOTE — CONSULTS
Consultation - Hospitalist   Name: Aaron Richards 59 y.o. male I MRN: [id number]  Unit/Bed#: E2 -01 I Date of Admission: 9/17/2024   Date of Service: 9/19/2024 I Hospital Day: 2   Inpatient consult to Internal Medicine  Consult performed by: Petros Covarrubias DO  Consult ordered by: Dionne Kuhn PA-C        Physician Requesting Evaluation: Jorge Hendrix DO   Reason for Evaluation / Principal Problem: preoperative evaluation.  Diabetes management.          Chief Complaint:     Right knee pain    History of Present Illness  Aaron Richards is a 59 y.o. male with a PMH of multiple right knee surgeries with complications who presents with a fall and increasing right knee pain.  Unfortunately is going to need another right knee surgery which will likely be complex.  Internal medicine is asked to evaluate him preoperatively.  Patient has no worrisome cardiac symptoms.  No chest pain.  No shortness of breath.  He does not walk because of these knee issues very well.  So does not go up a flight of stairs.  But last week he was in physical therapy and he states he did 10 minutes of strenuous activity on an exercise bike which included a rowing arms.  He stated he had no chest pain or shortness of breath during it.  Never had any cardiac issues.  States his right knee pain is under control.  No fever or chills.  He is eating and drinking well.  He last had surgery this summer on his knee with no issues with anesthesia other than some urinary retention afterwards.  But he had no problems cardiac wise with that surgery..    Review of Systems    Past Medical and Surgical History:   Past Medical History:   Diagnosis Date    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    Chronic kidney disease     Colon polyp     Constipation 03/09/2023    Diabetes mellitus (HCC)     Disease of thyroid gland     GERD (gastroesophageal reflux disease)     HHS vs DKA 11/16/2018    Hyperlipidemia     Hypertension      Thyroid cancer (HCC)      Past Surgical History:   Procedure Laterality Date    COLONOSCOPY      FOOT AMPUTATION Right     FOOT SURGERY Right 2014    IR AORTAGRAM WITH RUN-OFF  08/06/2020    IR TUNNELED CENTRAL LINE PLACEMENT  09/08/2020    IR TUNNELED CENTRAL LINE REMOVAL  09/28/2020    WY AMPUTATION FOOT TRANSMETARSAL Right 09/03/2020    Procedure: AMPUTATION TRANSMETATARSAL (TMA);  Surgeon: Tracy Murillo DPM;  Location: [facility];  Service: Podiatry    WY ARTHRP KNE CONDYLE&PLATU MEDIAL&LAT COMPARTMENTS Right 7/15/2024    Procedure: ARTHROPLASTY KNEE TOTAL;  Surgeon: Jorge Hendrix DO;  Location: [facility];  Service: Orthopedics    WY OPEN TX TIBIAL FRACTURE PROXIMAL UNICONDYLAR Right 5/1/2023    Procedure: OPEN REDUCTION W/ INTERNAL FIXATION (ORIF) RIGHT TIBIAL PLATEAU NONUNION;  Surgeon: Sesar Hilario MD;  Location: [facility];  Service: Orthopedics    WY REMOVAL IMPLANT DEEP Right 1/22/2024    Procedure: REMOVAL HARDWARE TIBIA;  Surgeon: Sesar Hilario MD;  Location: [facility];  Service: Orthopedics    WY THYROIDECTOMY TOTAL/COMPLETE N/A 02/03/2021    Procedure: TOTAL THYROIDECTOMY WITH NIMS MONITORING;  Surgeon: Sesar Garvey MD;  Location: [facility];  Service: ENT    TOE AMPUTATION Right 08/11/2020    Procedure: AMPUTATION TOE;  Surgeon: Tracy Murillo DPM;  Location: [facility];  Service: Podiatry    US GUIDED THYROID BIOPSY  07/02/2020     Meds/Allergies:  Allergies:   Allergies   Allergen Reactions    Vancomycin Other (See Comments)     Kidney issues    Pollen Extract Eye Swelling     Eyes get watery      Prior to Admission Medications   Prescriptions Last Dose Informant Patient Reported? Taking?   BD Pen Needle Ijeoma U/F 32G X 4 MM MISC 9/17/2024 Self No Yes   Sig: Inject under the skin 4 (four) times a day   Blood Glucose Monitoring Suppl (ONE TOUCH ULTRA 2) w/Device KIT 9/17/2024 Self No Yes   Sig: Use 2 (two) times a day   CALCIUM MAGNESIUM ZINC PO 9/17/2024  Yes Yes   Sig: Take 1  tablet by mouth in the morning   Insulin Glargine Solostar (Lantus SoloStar) 100 UNIT/ML SOPN 9/16/2024  No Yes   Sig: Inject 0.25 mL (25 Units total) under the skin every 12 (twelve) hours INJECT 45 UNITS TWICE A DAY   Patient taking differently: Inject 30 Units under the skin every 12 (twelve) hours INJECT 60 UNITS TWICE A DAY   Multiple Vitamins-Minerals (multivitamin with minerals) tablet Not Taking  No No   Sig: Take 1 tablet by mouth daily   Patient not taking: Reported on 9/17/2024   acetaminophen (TYLENOL) 500 mg tablet Past Week  No Yes   Sig: Take 2 tablets (1,000 mg total) by mouth 3 (three) times a day as needed for mild pain or moderate pain   ascorbic acid (VITAMIN C) 500 MG tablet 9/17/2024  No Yes   Sig: Take 1 tablet (500 mg total) by mouth 2 (two) times a day   aspirin (ECOTRIN LOW STRENGTH) 81 mg EC tablet   No Yes   Sig: Take 1 tablet (81 mg total) by mouth 2 (two) times a day for 13 days   Patient taking differently: Take 324 mg by mouth 2 (two) times a day   atorvastatin (LIPITOR) 40 mg tablet 9/17/2024  No Yes   Sig: Take 1 tablet (40 mg total) by mouth daily with dinner   cholecalciferol (VITAMIN D3) 1,000 units tablet 9/17/2024  No Yes   Sig: take 2 tablets by mouth once daily   docusate sodium (COLACE) 100 mg capsule Past Week  No Yes   Sig: Take 1 capsule (100 mg total) by mouth 2 (two) times a day as needed (hard stools)   doxycycline hyclate (VIBRAMYCIN) 100 mg capsule 9/16/2024  No Yes   Sig: Take 1 capsule (100 mg total) by mouth every 12 (twelve) hours   escitalopram (LEXAPRO) 5 mg tablet 9/17/2024  No Yes   Sig: Take 1 tablet (5 mg total) by mouth daily   folic acid (FOLVITE) 1 mg tablet 9/17/2024  No Yes   Sig: take 1 tablet by mouth once daily   insulin lispro (HumALOG/ADMELOG) 100 units/mL injection 9/16/2024  No Yes   Sig: Inject 1-6 Units under the skin 3 (three) times a day before meals   Patient taking differently: Inject 1-6 Units under the skin 3 (three) times a day before  meals Pt uses sliding scale   levothyroxine 175 mcg tablet 9/17/2024  No Yes   Sig: Take 1 tablet (175 mcg total) by mouth daily in the early morning   omeprazole (PriLOSEC) 40 MG capsule Past Week  No Yes   Sig: Take 1 capsule (40 mg total) by mouth every other day   ondansetron (ZOFRAN-ODT) 4 mg disintegrating tablet Not Taking  No No   Sig: Take 1 tablet (4 mg total) by mouth every 6 (six) hours as needed for nausea or vomiting   Patient not taking: Reported on 9/17/2024   zolpidem (AMBIEN) 10 mg tablet   No Yes   Sig: Take 1 tablet (10 mg total) by mouth daily at bedtime as needed for sleep for up to 10 days      Facility-Administered Medications: None     Social History:     Social History     Socioeconomic History    Marital status: Single     Spouse name: Not on file    Number of children: Not on file    Years of education: Not on file    Highest education level: Not on file   Occupational History    Not on file   Tobacco Use    Smoking status: Never    Smokeless tobacco: Never   Vaping Use    Vaping status: Never Used   Substance and Sexual Activity    Alcohol use: Not Currently     Comment: quit 1-2023    Drug use: Not Currently     Types: Marijuana    Sexual activity: Not Currently     Partners: Female   Other Topics Concern    Not on file   Social History Narrative    Light caffeine     Wears seatbelt daily    Lives with girlfriend     Social Determinants of Health     Financial Resource Strain: Low Risk  (9/18/2023)    Overall Financial Resource Strain (CARDIA)     Difficulty of Paying Living Expenses: Not hard at all   Food Insecurity: No Food Insecurity (8/1/2024)    Hunger Vital Sign     Worried About Running Out of Food in the Last Year: Never true     Ran Out of Food in the Last Year: Never true   Transportation Needs: No Transportation Needs (7/21/2024)    PRAPARE - Transportation     Lack of Transportation (Medical): No     Lack of Transportation (Non-Medical): No   Physical Activity: Not on file  "  Stress: Not on file (2/9/2021)   Social Connections: Not on file   Intimate Partner Violence: Not on file (2/9/2021)   Housing Stability: Low Risk  (8/1/2024)    Housing Stability Vital Sign     Unable to Pay for Housing in the Last Year: No     Number of Times Moved in the Last Year: 0     Homeless in the Last Year: No       Objective     Vitals:   Blood Pressure: 139/91 (09/19/24 0716)  Pulse: 79 (09/19/24 0716)  Temperature: 98 °F (36.7 °C) (09/19/24 0716)  Temp Source: Oral (09/19/24 0716)  Respirations: 18 (09/19/24 0716)  Height: 6' 4\" (193 cm) (09/17/24 1650)  Weight - Scale: 109 kg (240 lb 4.8 oz) (09/17/24 1650)  SpO2: 100 % (09/19/24 0716)    Physical Exam  Lines/Drains:    Additional Data:   Lab Results: I have reviewed the following results:   Results from last 7 days   Lab Units 09/17/24  1354   WBC Thousand/uL 12.92*   HEMOGLOBIN g/dL 12.6   HEMATOCRIT % 37.8   PLATELETS Thousands/uL 237   SEGS PCT % 65   LYMPHO PCT % 19   MONO PCT % 11   EOS PCT % 3     Results from last 7 days   Lab Units 09/17/24  1354   SODIUM mmol/L 136   POTASSIUM mmol/L 4.3   CHLORIDE mmol/L 105   CO2 mmol/L 22   ANION GAP mmol/L 9   BUN mg/dL 28*   CREATININE mg/dL 0.83   CALCIUM mg/dL 9.3   ALBUMIN g/dL 3.8   TOTAL BILIRUBIN mg/dL 0.57   ALK PHOS U/L 138*   ALT U/L 18   AST U/L 23   EGFR ml/min/1.73sq m 94   GLUCOSE RANDOM mg/dL 107               Administrative Statements       ** Please Note: This note has been constructed using a voice recognition system. **        "

## 2024-09-19 NOTE — PLAN OF CARE
Problem: Potential for Falls  Goal: Patient will remain free of falls  Description: INTERVENTIONS:  - Educate patient/family on patient safety including physical limitations  - Instruct patient to call for assistance with activity   - Consult OT/PT to assist with strengthening/mobility   - Keep Call bell within reach  - Keep bed low and locked with side rails adjusted as appropriate  - Keep care items and personal belongings within reach  - Initiate and maintain comfort rounds  - Make Fall Risk Sign visible to staff  - Initiate/Maintain bed alarm  - Obtain necessary fall risk management equipment: bed alarm, call bell,  socks   - Apply yellow socks and bracelet for high fall risk patients  - Consider moving patient to room near nurses station  Outcome: Progressing     Problem: PAIN - ADULT  Goal: Verbalizes/displays adequate comfort level or baseline comfort level  Description: Interventions:  - Encourage patient to monitor pain and request assistance  - Assess pain using appropriate pain scale  - Administer analgesics based on type and severity of pain and evaluate response  - Implement non-pharmacological measures as appropriate and evaluate response  - Consider cultural and social influences on pain and pain management  - Notify physician/advanced practitioner if interventions unsuccessful or patient reports new pain  Outcome: Progressing     Problem: INFECTION - ADULT  Goal: Absence or prevention of progression during hospitalization  Description: INTERVENTIONS:  - Assess and monitor for signs and symptoms of infection  - Monitor lab/diagnostic results  - Monitor all insertion sites, i.e. indwelling lines, tubes, and drains  - Monitor endotracheal if appropriate and nasal secretions for changes in amount and color  - [place] appropriate cooling/warming therapies per order  - Administer medications as ordered  - Instruct and encourage patient and family to use good hand hygiene technique  - Identify and  instruct in appropriate isolation precautions for identified infection/condition  Outcome: Progressing  Goal: Absence of fever/infection during neutropenic period  Description: INTERVENTIONS:  - Monitor WBC    Outcome: Progressing     Problem: SAFETY ADULT  Goal: Patient will remain free of falls  Description: INTERVENTIONS:  - Educate patient/family on patient safety including physical limitations  - Instruct patient to call for assistance with activity   - Consult OT/PT to assist with strengthening/mobility   - Keep Call bell within reach  - Keep bed low and locked with side rails adjusted as appropriate  - Keep care items and personal belongings within reach  - Initiate and maintain comfort rounds  - Make Fall Risk Sign visible to staff  - Initiate/Maintain bed alarm  - Obtain necessary fall risk management equipment: bed alarm, call bell,  socks  - Apply yellow socks and bracelet for high fall risk patients  - Consider moving patient to room near nurses station  Outcome: Progressing  Goal: Maintain or return to baseline ADL function  Description: INTERVENTIONS:  -  Assess patient's ability to carry out ADLs; assess patient's baseline for ADL function and identify physical deficits which impact ability to perform ADLs (bathing, care of mouth/teeth, toileting, grooming, dressing, etc.)  - Assess/evaluate cause of self-care deficits   - Assess range of motion  - Assess patient's mobility; develop plan if impaired  - Assess patient's need for assistive devices and provide as appropriate  - Encourage maximum independence but intervene and supervise when necessary  - Involve family in performance of ADLs  - Assess for home care needs following discharge   - Consider OT consult to assist with ADL evaluation and planning for discharge  - Provide patient education as appropriate  Outcome: Progressing  Goal: Maintains/Returns to pre admission functional level  Description: INTERVENTIONS:  - Perform AM-PAC 6 Click  Basic Mobility/ Daily Activity assessment daily.  - Set and communicate daily mobility goal to care team and patient/family/caregiver.   - Collaborate with rehabilitation services on mobility goals if consulted  - Perform Range of Motion 3 times a day.  - Reposition patient every 2 hours.  - Dangle patient 3 times a day  - Stand patient 3 times a day  - Ambulate patient 3 times a day  - Out of bed to chair 3 times a day   - Out of bed for meals 3 times a day  - Out of bed for toileting  - Record patient progress and toleration of activity level   Outcome: Progressing     Problem: DISCHARGE PLANNING  Goal: Discharge to home or other facility with appropriate resources  Description: INTERVENTIONS:  - Identify barriers to discharge w/patient and caregiver  - Arrange for needed discharge resources and transportation as appropriate  - Identify discharge learning needs (meds, wound care, etc.)  - Arrange for interpretive services to assist at discharge as needed  - Refer to Case Management Department for coordinating discharge planning if the patient needs post-hospital services based on physician/advanced practitioner order or complex needs related to functional status, cognitive ability, or social support system  Outcome: Progressing     Problem: Knowledge Deficit  Goal: Patient/family/caregiver demonstrates understanding of disease process, treatment plan, medications, and discharge instructions  Description: Complete learning assessment and assess knowledge base.  Interventions:  - Provide teaching at level of understanding  - Provide teaching via preferred learning methods  Outcome: Progressing     Problem: Prexisting or High Potential for Compromised Skin Integrity  Goal: Skin integrity is maintained or improved  Description: INTERVENTIONS:  - Identify patients at risk for skin breakdown  - Assess and monitor skin integrity  - Assess and monitor nutrition and hydration status  - Monitor labs   - Assess for  incontinence   - Turn and reposition patient  - Assist with mobility/ambulation  - Relieve pressure over bony prominences  - Avoid friction and shearing  - Provide appropriate hygiene as needed including keeping skin clean and dry  - Evaluate need for skin moisturizer/barrier cream  - Collaborate with interdisciplinary team   - Patient/family teaching  - Consider wound care consult   Outcome: Progressing

## 2024-09-20 LAB
ABO GROUP BLD BPU: NORMAL
ABO GROUP BLD BPU: NORMAL
ANION GAP SERPL CALCULATED.3IONS-SCNC: 7 MMOL/L (ref 4–13)
BASOPHILS # BLD AUTO: 0.03 THOUSANDS/ΜL (ref 0–0.1)
BASOPHILS NFR BLD AUTO: 0 % (ref 0–1)
BPU ID: NORMAL
BPU ID: NORMAL
BUN SERPL-MCNC: 24 MG/DL (ref 5–25)
CALCIUM SERPL-MCNC: 8.5 MG/DL (ref 8.4–10.2)
CHLORIDE SERPL-SCNC: 104 MMOL/L (ref 96–108)
CO2 SERPL-SCNC: 22 MMOL/L (ref 21–32)
CREAT SERPL-MCNC: 0.82 MG/DL (ref 0.6–1.3)
CROSSMATCH: NORMAL
CROSSMATCH: NORMAL
EOSINOPHIL # BLD AUTO: 0 THOUSAND/ΜL (ref 0–0.61)
EOSINOPHIL NFR BLD AUTO: 0 % (ref 0–6)
ERYTHROCYTE [DISTWIDTH] IN BLOOD BY AUTOMATED COUNT: 12.6 % (ref 11.6–15.1)
GFR SERPL CREATININE-BSD FRML MDRD: 95 ML/MIN/1.73SQ M
GLUCOSE SERPL-MCNC: 192 MG/DL (ref 65–140)
GLUCOSE SERPL-MCNC: 225 MG/DL (ref 65–140)
GLUCOSE SERPL-MCNC: 239 MG/DL (ref 65–140)
GLUCOSE SERPL-MCNC: 252 MG/DL (ref 65–140)
GLUCOSE SERPL-MCNC: 290 MG/DL (ref 65–140)
HCT VFR BLD AUTO: 29.1 % (ref 36.5–49.3)
HGB BLD-MCNC: 9.6 G/DL (ref 12–17)
IMM GRANULOCYTES # BLD AUTO: 0.09 THOUSAND/UL (ref 0–0.2)
IMM GRANULOCYTES NFR BLD AUTO: 1 % (ref 0–2)
LYMPHOCYTES # BLD AUTO: 1.64 THOUSANDS/ΜL (ref 0.6–4.47)
LYMPHOCYTES NFR BLD AUTO: 9 % (ref 14–44)
MAGNESIUM SERPL-MCNC: 1.6 MG/DL (ref 1.9–2.7)
MCH RBC QN AUTO: 30.4 PG (ref 26.8–34.3)
MCHC RBC AUTO-ENTMCNC: 33 G/DL (ref 31.4–37.4)
MCV RBC AUTO: 92 FL (ref 82–98)
MONOCYTES # BLD AUTO: 2.08 THOUSAND/ΜL (ref 0.17–1.22)
MONOCYTES NFR BLD AUTO: 11 % (ref 4–12)
NEUTROPHILS # BLD AUTO: 14.65 THOUSANDS/ΜL (ref 1.85–7.62)
NEUTS SEG NFR BLD AUTO: 79 % (ref 43–75)
NRBC BLD AUTO-RTO: 0 /100 WBCS
PLATELET # BLD AUTO: 199 THOUSANDS/UL (ref 149–390)
PMV BLD AUTO: 11.2 FL (ref 8.9–12.7)
POTASSIUM SERPL-SCNC: 4.6 MMOL/L (ref 3.5–5.3)
RBC # BLD AUTO: 3.16 MILLION/UL (ref 3.88–5.62)
SODIUM SERPL-SCNC: 133 MMOL/L (ref 135–147)
UNIT DISPENSE STATUS: NORMAL
UNIT DISPENSE STATUS: NORMAL
UNIT PRODUCT CODE: NORMAL
UNIT PRODUCT CODE: NORMAL
UNIT PRODUCT VOLUME: 300 ML
UNIT PRODUCT VOLUME: 300 ML
UNIT RH: NORMAL
UNIT RH: NORMAL
WBC # BLD AUTO: 18.49 THOUSAND/UL (ref 4.31–10.16)

## 2024-09-20 PROCEDURE — 80048 BASIC METABOLIC PNL TOTAL CA: CPT | Performed by: PHYSICIAN ASSISTANT

## 2024-09-20 PROCEDURE — 85025 COMPLETE CBC W/AUTO DIFF WBC: CPT | Performed by: HOSPITALIST

## 2024-09-20 PROCEDURE — 97110 THERAPEUTIC EXERCISES: CPT | Performed by: PHYSICAL THERAPIST

## 2024-09-20 PROCEDURE — 82948 REAGENT STRIP/BLOOD GLUCOSE: CPT

## 2024-09-20 PROCEDURE — 83735 ASSAY OF MAGNESIUM: CPT | Performed by: HOSPITALIST

## 2024-09-20 PROCEDURE — 97167 OT EVAL HIGH COMPLEX 60 MIN: CPT

## 2024-09-20 PROCEDURE — 97163 PT EVAL HIGH COMPLEX 45 MIN: CPT | Performed by: PHYSICAL THERAPIST

## 2024-09-20 PROCEDURE — 99232 SBSQ HOSP IP/OBS MODERATE 35: CPT | Performed by: HOSPITALIST

## 2024-09-20 RX ORDER — MAGNESIUM SULFATE 1 G/100ML
1 INJECTION INTRAVENOUS ONCE
Status: COMPLETED | OUTPATIENT
Start: 2024-09-20 | End: 2024-09-21

## 2024-09-20 RX ORDER — METOCLOPRAMIDE HYDROCHLORIDE 5 MG/ML
10 INJECTION INTRAMUSCULAR; INTRAVENOUS EVERY 6 HOURS PRN
Status: DISCONTINUED | OUTPATIENT
Start: 2024-09-20 | End: 2024-09-21

## 2024-09-20 RX ORDER — INSULIN GLARGINE 100 [IU]/ML
25 INJECTION, SOLUTION SUBCUTANEOUS EVERY 12 HOURS SCHEDULED
Status: DISCONTINUED | OUTPATIENT
Start: 2024-09-20 | End: 2024-09-23 | Stop reason: HOSPADM

## 2024-09-20 RX ORDER — HYDROCODONE BITARTRATE AND ACETAMINOPHEN 5; 325 MG/1; MG/1
1 TABLET ORAL EVERY 4 HOURS PRN
Status: DISCONTINUED | OUTPATIENT
Start: 2024-09-20 | End: 2024-09-23 | Stop reason: HOSPADM

## 2024-09-20 RX ORDER — TRAMADOL HYDROCHLORIDE 50 MG/1
50 TABLET ORAL EVERY 4 HOURS PRN
Status: DISCONTINUED | OUTPATIENT
Start: 2024-09-20 | End: 2024-09-23 | Stop reason: HOSPADM

## 2024-09-20 RX ADMIN — LEVOTHYROXINE SODIUM 175 MCG: 125 TABLET ORAL at 06:07

## 2024-09-20 RX ADMIN — ESCITALOPRAM OXALATE 5 MG: 10 TABLET ORAL at 08:34

## 2024-09-20 RX ADMIN — INSULIN LISPRO 6 UNITS: 100 INJECTION, SOLUTION INTRAVENOUS; SUBCUTANEOUS at 21:47

## 2024-09-20 RX ADMIN — INSULIN LISPRO 6 UNITS: 100 INJECTION, SOLUTION INTRAVENOUS; SUBCUTANEOUS at 08:00

## 2024-09-20 RX ADMIN — CEFAZOLIN SODIUM 2000 MG: 2 SOLUTION INTRAVENOUS at 14:36

## 2024-09-20 RX ADMIN — HYDROMORPHONE HYDROCHLORIDE 0.5 MG: 1 INJECTION, SOLUTION INTRAMUSCULAR; INTRAVENOUS; SUBCUTANEOUS at 11:02

## 2024-09-20 RX ADMIN — FOLIC ACID 1 MG: 1 TABLET ORAL at 08:35

## 2024-09-20 RX ADMIN — CEFAZOLIN SODIUM 2000 MG: 2 SOLUTION INTRAVENOUS at 06:05

## 2024-09-20 RX ADMIN — INSULIN LISPRO 4 UNITS: 100 INJECTION, SOLUTION INTRAVENOUS; SUBCUTANEOUS at 16:29

## 2024-09-20 RX ADMIN — ATORVASTATIN CALCIUM 40 MG: 40 TABLET, FILM COATED ORAL at 16:20

## 2024-09-20 RX ADMIN — INSULIN LISPRO 4 UNITS: 100 INJECTION, SOLUTION INTRAVENOUS; SUBCUTANEOUS at 11:48

## 2024-09-20 RX ADMIN — CEFAZOLIN SODIUM 2000 MG: 2 SOLUTION INTRAVENOUS at 21:50

## 2024-09-20 RX ADMIN — HYDROMORPHONE HYDROCHLORIDE 0.5 MG: 1 INJECTION, SOLUTION INTRAMUSCULAR; INTRAVENOUS; SUBCUTANEOUS at 06:20

## 2024-09-20 RX ADMIN — ASPIRIN 81 MG: 81 TABLET, COATED ORAL at 08:35

## 2024-09-20 RX ADMIN — ONDANSETRON 4 MG: 2 INJECTION INTRAMUSCULAR; INTRAVENOUS at 08:26

## 2024-09-20 RX ADMIN — DOCUSATE SODIUM 100 MG: 100 CAPSULE, LIQUID FILLED ORAL at 08:33

## 2024-09-20 RX ADMIN — ONDANSETRON 4 MG: 2 INJECTION INTRAMUSCULAR; INTRAVENOUS at 16:27

## 2024-09-20 RX ADMIN — Medication 1 TABLET: at 08:35

## 2024-09-20 RX ADMIN — PANTOPRAZOLE SODIUM 40 MG: 40 TABLET, DELAYED RELEASE ORAL at 06:07

## 2024-09-20 RX ADMIN — HYDROMORPHONE HYDROCHLORIDE 0.5 MG: 1 INJECTION, SOLUTION INTRAMUSCULAR; INTRAVENOUS; SUBCUTANEOUS at 14:33

## 2024-09-20 RX ADMIN — OXYCODONE HYDROCHLORIDE AND ACETAMINOPHEN 500 MG: 500 TABLET ORAL at 08:34

## 2024-09-20 RX ADMIN — ACETAMINOPHEN 325MG 975 MG: 325 TABLET ORAL at 10:58

## 2024-09-20 RX ADMIN — Medication 2000 UNITS: at 08:34

## 2024-09-20 RX ADMIN — SENNOSIDES 8.6 MG: 8.6 TABLET, FILM COATED ORAL at 08:34

## 2024-09-20 RX ADMIN — METOCLOPRAMIDE 10 MG: 5 INJECTION, SOLUTION INTRAMUSCULAR; INTRAVENOUS at 18:28

## 2024-09-20 RX ADMIN — HYDROMORPHONE HYDROCHLORIDE 4 MG: 2 TABLET ORAL at 02:44

## 2024-09-20 RX ADMIN — TRAMADOL HYDROCHLORIDE 50 MG: 50 TABLET, FILM COATED ORAL at 16:18

## 2024-09-20 RX ADMIN — INSULIN GLARGINE 25 UNITS: 100 INJECTION, SOLUTION SUBCUTANEOUS at 08:27

## 2024-09-20 RX ADMIN — INSULIN GLARGINE 25 UNITS: 100 INJECTION, SOLUTION SUBCUTANEOUS at 21:47

## 2024-09-20 RX ADMIN — HYDROMORPHONE HYDROCHLORIDE 0.5 MG: 1 INJECTION, SOLUTION INTRAMUSCULAR; INTRAVENOUS; SUBCUTANEOUS at 08:32

## 2024-09-20 RX ADMIN — MAGNESIUM SULFATE IN DEXTROSE 1 G: 10 INJECTION, SOLUTION INTRAVENOUS at 16:19

## 2024-09-20 NOTE — PLAN OF CARE
Problem: PHYSICAL THERAPY ADULT  Goal: Performs mobility at highest level of function for planned discharge setting.  See evaluation for individualized goals.  Description: Treatment/Interventions: LE strengthening/ROM, Functional transfer training, Elevations, Therapeutic exercise, Endurance training, Patient/family training, Bed mobility, Gait training, Spoke to nursing, OT          See flowsheet documentation for full assessment, interventions and recommendations.  Note: Prognosis: Good  Problem List: Decreased strength, Decreased range of motion, Decreased endurance, Decreased mobility, Impaired balance, Pain  Assessment: Pt. 59 y.o.male presents for elective surgery. Past medical hx includes thyroid CA, R TMA, CKD, DM, HTN, HLD, MANNY, PAD. 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 S/p elective TKR revision POD #1. Pt referred to PT for functional mobility evaluation & D/C planning w/ orders of up w/ assistance. WBAT RLE. PTA, pt reports being (+) hx of falls  and I w/ RW. Personal factors affecting pt at time of IE include: dec caregiver support, decreased independence in ADLs/IADLs, steps to enter, and use of AD . During evaluation, deficits included dec mobility, balance, ambulation. Required S for supine<>sit, minAx2 for sit<>stand. Inc dizziness with standing. Pt declining ambulation this session 2* symptoms. Pt demonstrated dec endurance and tolerance to activity. Denies reports of dizziness or SOB t/o session. Pt was educated on fall precautions and reinforced w/ good understanding. Pt would benefit from continued PT to address deficits as defined above and maximize level of independence with functional mobility and safety. Based on pt presentation and impaired function, pt would benefit from level II, (moderate resource intensity) at D/C pending progressing and  willingness to participate in therapy. The patient's AM-PAC Basic Mobility Inpatient Short Form Raw Score is 17. A Raw score of greater than 16 suggests the patient may benefit from discharge to home. Please also refer to the recommendation of the Physical Therapist for safe discharge planning. Nsg staff to continue to mobilized pt (OOB in chair for all meals & ambulate in room/unit) as tolerated to prevent further decline in function. Nsg notified. Co-eval performed to complete this PT evaluation for the pts best interest given pts medical complexity and functional level.  Barriers to Discharge: Inaccessible home environment     Rehab Resource Intensity Level, PT: II (Moderate Resource Intensity) (pending progress and participation with PT)    See flowsheet documentation for full assessment.

## 2024-09-20 NOTE — PHYSICAL THERAPY NOTE
PT EVALUATION    Pt. Name: Aaron Richards  Pt. Age: 59 y.o.  MRN: [id number]  LENGTH OF STAY: 3    Patient Active Problem List   Diagnosis    Hypertension, essential    Gastroesophageal reflux disease without esophagitis    Hyperlipidemia    History of nonadherence to medical treatment    Right-sided tinnitus    Colon polyps    Elevated liver enzymes    Preoperative cardiovascular examination    PAD (peripheral artery disease) (HCC)    Thyroid cancer (HCC)    Urinary retention    Orthostasis    Abnormal EKG    Insomnia    Persistent proteinuria    Acquired absence of right foot (HCC)    Acquired hypothyroidism    Type 2 diabetes mellitus with diabetic peripheral angiopathy without gangrene (HCC)    Dupuytren's contracture of right hand    Stage 3b chronic kidney disease (HCC)    Hyperkalemia    Alcoholism (HCC)    Closed fracture of right tibial plateau    MARKELL (acute kidney injury) (HCC)    Chronic kidney disease-mineral and bone disorder    Acquired genu varum of right lower extremity    Depression and anxiety    Constipation    MANNY (obstructive sleep apnea)    S/P hardware removal    Septic arthritis of knee (HCC)    Chronic pain of right knee    Type 2 diabetes mellitus without complication, with long-term current use of insulin (HCC)    Poor dentition    Pain    At risk for venous thromboembolism (VTE)    Diabetic neuropathy (HCC)    Anemia    Leukocytosis    Periprosthetic fracture around internal prosthetic right knee joint       Admitting Diagnoses:   Knee pain [M25.569]  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) [T84.012A]    Past Medical History:   Diagnosis Date    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    Chronic kidney disease     Colon polyp     Constipation 03/09/2023    Diabetes mellitus (HCC)     Disease of thyroid gland     GERD (gastroesophageal reflux disease)     HHS vs DKA 11/16/2018    Hyperlipidemia     Hypertension     Thyroid cancer (HCC)        Past  Surgical History:   Procedure Laterality Date    COLONOSCOPY      FOOT AMPUTATION Right     FOOT SURGERY Right 2014    IR AORTAGRAM WITH RUN-OFF  08/06/2020    IR TUNNELED CENTRAL LINE PLACEMENT  09/08/2020    IR TUNNELED CENTRAL LINE REMOVAL  09/28/2020    NJ AMPUTATION FOOT TRANSMETARSAL Right 09/03/2020    Procedure: AMPUTATION TRANSMETATARSAL (TMA);  Surgeon: Tracy Murillo DPM;  [facility];  Service: Podiatry    NJ ARTHRP KNE CONDYLE&PLATU MEDIAL&LAT COMPARTMENTS Right 7/15/2024    Procedure: ARTHROPLASTY KNEE TOTAL;  Surgeon: Jorge Hendrix DO;  Location[facility];  Service: Orthopedics    NJ OPEN TX TIBIAL FRACTURE PROXIMAL UNICONDYLAR Right 5/1/2023    Procedure: OPEN REDUCTION W/ INTERNAL FIXATION (ORIF) RIGHT TIBIAL PLATEAU NONUNION;  Surgeon: Sesar Hilario MD;  [facility];  Service: Orthopedics    NJ REMOVAL IMPLANT DEEP Right 1/22/2024    Procedure: REMOVAL HARDWARE TIBIA;  Surgeon: Sesar Hilario MD;  [facility];  Service: Orthopedics    NJ THYROIDECTOMY TOTAL/COMPLETE N/A 02/03/2021    Procedure: TOTAL THYROIDECTOMY WITH NIMS MONITORING;  Surgeon: Sesar Garvey MD;  [facility]: [facility];  Service: ENT    TOE AMPUTATION Right 08/11/2020    Procedure: AMPUTATION TOE;  Surgeon: Tracy Murillo DPM;  [facility];  Service: Podiatry    US GUIDED THYROID BIOPSY  07/02/2020       Imaging Studies:  XR knee right 1 or 2 views   Final Result by Ector Ferraro MD (09/19 4787)   Acute phase postoperative changes without evidence for acute periprosthetic fracture or malalignment. This will serve as a baseline for future exams.         Computerized Assisted Algorithm (CAA) may have been used to analyze all applicable images.         Workstation performed: [id number]         XR knee 1 or 2 vw right   Final Result by Artemio Thomas MD (09/19 1415)      Fluoroscopy provided for procedure guidance.      Please refer to the separate procedure note for additional  details.                     Workstation performed: [id number]         CT lower extremity wo contrast right   Final Result by Jono Garcia MD (09/17 5496)      1.  No interval change in the appearance of the displaced tibial plateau hardware component and fragment fractures along the posterior aspect of the tibial plateau. There is a large complex joint effusion with diffuse synovial thickening. Please see the    body of the report for further description of the pertinent findings.            Workstation performed: [id number]              09/20/24 1032   PT Last Visit   PT Visit Date 09/20/24   Note Type   Note type Evaluation   Pain Assessment   Pain Assessment Tool 0-10   Pain Score 10 - Worst Possible Pain   Pain Location/Orientation Orientation: Right;Location: Knee   Hospital Pain Intervention(s) Cold applied;Repositioned;Ambulation/increased activity;Elevated;Emotional support;Rest   Restrictions/Precautions   Weight Bearing Precautions Per Order Yes   RLE Weight Bearing Per Order WBAT   Other Precautions Multiple lines;Fall Risk;Pain  (casey)   Home Living   Type of Home House  (Ran)   Home Layout One level;Performs ADLs on one level;Able to live on main level with bedroom/bathroom;Stairs to enter with rails  (3+1 DANIEL with handrail)   Bathroom Shower/Tub Tub/shower unit   Bathroom Toilet Raised   Bathroom Equipment Commode;Grab bars in shower;Tub transfer bench;Grab bars around toilet   Home Equipment Walker;Cane;Wheelchair-manual;Other (Comment)  (transport chair)   Prior Function   Level of [place] Independent with ADLs;Independent with functional mobility;Needs assistance with IADLS  (w/ RW)   Lives With Spouse   Receives Help From Family   IADLs Family/Friend/Other provides transportation;Family/Friend/Other provides meals;Independent with medication management   Falls in the last 6 months 1 to 4  (1x)   Vocational Retired   General   Family/Caregiver Present No   Cognition   Overall  Cognitive Status WFL   Arousal/Participation Alert   Attention Attends with cues to redirect   Orientation Level Oriented X4   Memory Within functional limits   Following Commands Follows one step commands with increased time or repetition   Comments required inc encouragement to particpate in therapy   Subjective   Subjective I will not do anything I do not want to do.   RUE Assessment   RUE Assessment   (refer to OT)   LUE Assessment   LUE Assessment   (refer to OT)   RLE Assessment   RLE Assessment X   Strength RLE   R Knee Flexion 3/5   R Knee Extension 3/5   R Ankle Dorsiflexion 4/5   R Ankle Plantar Flexion 4/5   LLE Assessment   LLE Assessment WFL  (4/5 grossly)   Light Touch   RLE Light Touch Grossly intact   LLE Light Touch Grossly intact   Bed Mobility   Supine to Sit 5  Supervision   Additional items HOB elevated;Bedrails;Increased time required;Verbal cues   Sit to Supine 5  Supervision   Additional items Bedrails;Increased time required;Verbal cues   Additional Comments Pt greeted in supine. /57 EOB.   Transfers   Sit to Stand 4  Minimal assistance   Additional items Assist x 2;Increased time required;Verbal cues  (w/ RW)   Stand to Sit 4  Minimal assistance   Additional items Assist x 2;Increased time required;Verbal cues  (w/ RW)   Additional Comments cues for hand placement   Ambulation/Elevation   Ambulation/Elevation Additional Comments pt declining ambulation. pt with inc dizziness. BP seated EOB post stand 105/61.   Balance   Static Sitting Good   Dynamic Sitting Fair   Static Standing Fair -  (w/ RW)   Activity Tolerance   Activity Tolerance Patient limited by pain;Other (Comment)  (self limiting)   Medical Staff Made Aware OT Brionna   Nurse Made Aware RN Jessie   Assessment   Prognosis Good   Problem List Decreased strength;Decreased range of motion;Decreased endurance;Decreased mobility;Impaired balance;Pain   Assessment Pt. 59 y.o.male presents for elective surgery. Past medical hx  includes thyroid CA, R TMA, CKD, DM, HTN, HLD, MANNY, PAD. 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 S/p elective TKR revision POD #1. Pt referred to PT for functional mobility evaluation & D/C planning w/ orders of up w/ assistance. WBAT RLE. PTA, pt reports being (+) hx of falls  and I w/ RW. Personal factors affecting pt at time of IE include: dec caregiver support, decreased independence in ADLs/IADLs, steps to enter, and use of AD . During evaluation, deficits included dec mobility, balance, ambulation. Required S for supine<>sit, minAx2 for sit<>stand. Inc dizziness with standing. Pt declining ambulation this session 2* symptoms. Pt demonstrated dec endurance and tolerance to activity. Denies reports of dizziness or SOB t/o session. Pt was educated on fall precautions and reinforced w/ good understanding. Pt would benefit from continued PT to address deficits as defined above and maximize level of independence with functional mobility and safety. Based on pt presentation and impaired function, pt would benefit from level II, (moderate resource intensity) at D/C pending progressing and willingness to participate in therapy. The patient's AM-PAC Basic Mobility Inpatient Short Form Raw Score is 17. A Raw score of greater than 16 suggests the patient may benefit from discharge to home. Please also refer to the recommendation of the Physical Therapist for safe discharge planning. Nsg staff to continue to mobilized pt (OOB in chair for all meals & ambulate in room/unit) as tolerated to prevent further decline in function. Nsg notified. Co-eval performed to complete this PT evaluation for the pts best interest given pts medical complexity and functional level.   Barriers to Discharge Inaccessible home environment   Goals   Patient Goals to sleep   STG Expiration Date 09/27/24   Short  Term Goal #1 1) Inc overall LE strength by 1/2 MMT grade to improve functional mobility; 2) Pt will demonstrate improved bed mobility with mod I to dec caregiver burden; 3) Pt will demonstrate improved transfers w/ mod I for inc safety; 4) Continue pre gait training with RW and progress to gait training with RW >20' with minAx1 when appropriate; 5) Improve general balance by 1 grade to inc safety; 6) PT for ongoing patient and caregiver education   PT Treatment Day 0   Plan   Treatment/Interventions LE strengthening/ROM;Functional transfer training;Elevations;Therapeutic exercise;Endurance training;Patient/family training;Bed mobility;Gait training;Spoke to nursing;OT   PT Frequency Twice a day   Discharge Recommendation   Rehab Resource Intensity Level, PT II (Moderate Resource Intensity)  (pending progress and participation with PT)   AM-PAC Basic Mobility Inpatient   Turning in Flat Bed Without Bedrails 4   Lying on Back to Sitting on Edge of Flat Bed Without Bedrails 4   Moving Bed to Chair 2   Standing Up From Chair Using Arms 2   Walk in Room 3   Climb 3-5 Stairs With Railing 2   Basic Mobility Inpatient Raw Score 17   Basic Mobility Standardized Score 39.67   [facility] Highest Level Of Mobility   -HL Goal 5: Stand one or more mins   -HL Achieved 5: Stand (1 or more minutes)   End of Consult   Patient Position at End of Consult Supine;All needs within reach       Hx/personal factors: co-morbidities, inaccessible home, dec caregiver support, use of AD, pain, h/o of falls, and fall risk, coping styles, social background, past experience, behavior pattern  Examination: dec mobility, dec balance, dec endurance, dec amb, risk for falls, pain, assessed body system, balance, endurance, amb, D/C disposition & fall risk, impairements in locomotion, musculoskeletal, balance, endurance, posture, coordination, assessed cognition, impairments in systems including multiple body structures involved; musculoskeletal  (ROM, strength, posture, BMI), neuromuscular (balance,locomotion, gait, transfers, motor control and learning, sensation), joint integrity, integumentary (skin integrity, presence of scars or wounds), cardiopulmonary (vitals, edema); activity limitations (difficulties executing an action); participation restrictions (problems associated w involvement in life situations)  Clinical: unpredictable (ongoing medical status, risk for falls, and pain mgt)  Complexity: high      Ana Machuca, PT

## 2024-09-20 NOTE — CASE MANAGEMENT
Case Management Assessment & Discharge Planning Note    Patient name Aaron Richards  [facility] -* MRN [id number]  : 1965 Date 2024       Current Admission Date: 2024  Current Admission Diagnosis:Periprosthetic fracture around internal prosthetic right knee joint   Patient Active Problem List    Diagnosis Date Noted Date Diagnosed    Periprosthetic fracture around internal prosthetic right knee joint 2024     Poor dentition 2024     Pain 2024     At risk for venous thromboembolism (VTE) 2024     Diabetic neuropathy (HCC) 2024     Anemia 2024     Leukocytosis 2024     Chronic pain of right knee 07/10/2024     Type 2 diabetes mellitus without complication, with long-term current use of insulin ([facility]) 07/10/2024     Septic arthritis of knee ([facility]) 2024     S/P hardware removal 2023     MANNY (obstructive sleep apnea) 2023     Constipation 2023     Depression and anxiety 2023     Acquired genu varum of right lower extremity 2023     MARKELL (acute kidney injury) ([facility]) 2023     Chronic kidney disease-mineral and bone disorder 2023     Closed fracture of right tibial plateau 2023     Stage 3b chronic kidney disease ([facility]) 11/15/2022     Hyperkalemia 11/15/2022     Alcoholism ([facility]) 11/15/2022     Dupuytren's contracture of right hand 2022     Type 2 diabetes mellitus with diabetic peripheral angiopathy without gangrene ([facility]) 2022     Acquired hypothyroidism 10/18/2021     Acquired absence of right foot ([facility]) 2021     Persistent proteinuria 2021     Abnormal EKG 2020     Insomnia 2020     Orthostasis 10/08/2020     Urinary retention 2020     Thyroid cancer ([facility]) 2020     PAD (peripheral artery disease) ([facility]) 2020     Preoperative cardiovascular examination 2020     Elevated liver enzymes 10/24/2019     Colon polyps 2019      Right-sided tinnitus 11/26/2018     Hypertension, essential 09/24/2018     Gastroesophageal reflux disease without esophagitis 09/24/2018     Hyperlipidemia 12/17/2009     History of nonadherence to medical treatment 02/20/2008       LOS (days): 3  Geometric Mean LOS (GMLOS) (days): 2.8  Days to GMLOS:0     OBJECTIVE:    Risk of Unplanned Readmission Score: 28.88         Current admission status: Inpatient       [facility]erred Pharmacy:   [id number] [place] [address] [facility] [address]  Phone: [phone] Fax: [phone]    [facility] - [place], [place] - [address] [place] [address]  [address] [place] [address]  Phone: [phone] Fax: [phone]    Primary Care Provider: Oswaldo Prather MD    Primary Insurance: MEDICARE  Secondary Insurance: BLUE CROSS    ASSESSMENT:  Active Health Care Proxies       Dottie Delong [facility] Care Representative - Spouse   Primary Phone: [phone] (Mobile)                           Readmission Root Cause  30 Day Readmission: No    Patient Information  Admitted from:: Home  Mental Status: Alert  During Assessment patient was accompanied by: Not accompanied during assessment  Assessment information provided by:: Patient  Primary Caregiver: Spouse  Caregiver's Name:: Dottie Delong  Caregiver's Relationship to Patient:: Significant Other  Caregiver's Telephone Number:: [phone]  Support Systems: Spouse/significant other  County of Residence: [place]  What [facility] do you live in?: [place]  Home entry access options. Select all that apply.: Stairs  Number of steps to enter home.: 3  Do the steps have railings?: Yes  Type of Current Residence: [facility]  Living Arrangements: Lives w/ Spouse/significant other  Is patient a ?: No    Activities of Daily Living Prior to Admission  Functional Status: Independent  Completes ADLs independently?: No (Pt requires assistance with ADLs which wife is able to  perform)  Level of ADL dependence: Assistance  Ambulates independently?: No  Level of ambulatory dependence: Assistance  Does patient use assisted devices?: Yes  Assisted Devices (DME) used: Walker, Wheelchair, Other (Comment) (transport chair; grab bars at shower and tub; handheld shower; tub transfer bench)  Does patient currently own DME?: Yes  What DME does the patient currently own?: Walker, Wheelchair, Other (Comment) (transport chair; grab bars at shower and tub; handheld shower; tub transfer bench)  Does patient have a history of Outpatient Therapy (PT/OT)?: Yes  Does the patient have a history of Short-Term Rehab?: Yes ([facility]; [facility]; rehab in [place])  Does patient have a history of HHC?: Yes  Does patient currently have HHC?: No         Patient Information Continued  Income Source: SSI/SSD  Does patient have prescription coverage?: Yes  Does patient receive dialysis treatments?: No  Does patient have a history of substance abuse?: Yes  Historical substance use preference: Alcohol/ETOH  Is patient currently in treatment for substance abuse?: N/A - sober  Does patient have a history of Mental Health Diagnosis?: Yes (Depression and anxiety)  Is patient receiving treatment for mental health?: Yes  Has patient received inpatient treatment related to mental health in the last 2 years?: No         Means of Transportation  Means of Transport to Appts:: Family transport      Social Determinants of Health (SDOH)      Flowsheet Row Most Recent Value   Housing Stability    In the last 12 months, was there a time when you were not able to pay the mortgage or rent on time? N   In the past 12 months, how many times have you moved where you were living? 0   At any time in the past 12 months, were you homeless or living in a shelter (including now)? N   Transportation Needs    In the past 12 months, has lack of transportation kept you from medical appointments or from getting medications? no   In the past  12 months, has lack of transportation kept you from meetings, work, or from getting things needed for daily living? No   Food Insecurity    Within the past 12 months, you worried that your food would run out before you got the money to buy more. Never true   Within the past 12 months, the food you bought just didn't last and you didn't have money to get more. Never true   Utilities    In the past 12 months has the electric, gas, oil, or water company threatened to shut off services in your home? No            DISCHARGE DETAILS:    Discharge planning discussed with:: Patient  Freedom of Choice:  (pending therapy recommendation)     CM contacted family/caregiver?: No- see comments (declined need at this time)                                                                                                         04-Mar-2022 23:02:57

## 2024-09-20 NOTE — CASE MANAGEMENT
Case Management Discharge Planning Note    Patient name Aaron Richards  Location [facility] /E2 -* MRN [id number]  : 1965 Date 2024       Current Admission Date: 2024  Current Admission Diagnosis:Periprosthetic fracture around internal prosthetic right knee joint   Patient Active Problem List    Diagnosis Date Noted Date Diagnosed    Periprosthetic fracture around internal prosthetic right knee joint 2024     Poor dentition 2024     Pain 2024     At risk for venous thromboembolism (VTE) 2024     Diabetic neuropathy (HCC) 2024     Anemia 2024     Leukocytosis 2024     Chronic pain of right knee 07/10/2024     Type 2 diabetes mellitus without complication, with long-term current use of insulin ([facility]) 07/10/2024     Septic arthritis of knee ([facility]) 2024     S/P hardware removal 2023     MANNY (obstructive sleep apnea) 2023     Constipation 2023     Depression and anxiety 2023     Acquired genu varum of right lower extremity 2023     MARKELL (acute kidney injury) ([facility]) 2023     Chronic kidney disease-mineral and bone disorder 2023     Closed fracture of right tibial plateau 2023     Stage 3b chronic kidney disease ([facility]) 11/15/2022     Hyperkalemia 11/15/2022     Alcoholism ([facility]) 11/15/2022     Dupuytren's contracture of right hand 2022     Type 2 diabetes mellitus with diabetic peripheral angiopathy without gangrene ([facility]) 2022     Acquired hypothyroidism 10/18/2021     Acquired absence of right foot ([facility]) 2021     Persistent proteinuria 2021     Abnormal EKG 2020     Insomnia 2020     Orthostasis 10/08/2020     Urinary retention 2020     Thyroid cancer ([facility]) 2020     PAD (peripheral artery disease) ([facility]) 2020     Preoperative cardiovascular examination 2020     Elevated liver enzymes 10/24/2019     Colon polyps 2019     Right-sided  tinnitus 11/26/2018     Hypertension, essential 09/24/2018     Gastroesophageal reflux disease without esophagitis 09/24/2018     Hyperlipidemia 12/17/2009     History of nonadherence to medical treatment 02/20/2008       LOS (days): 3  Geometric Mean LOS (GMLOS) (days): 2.8  Days to GMLOS:-0.3     OBJECTIVE:  Risk of Unplanned Readmission Score: 28.74         Current admission status: Inpatient   Preferred Pharmacy:   [id number] [place] [facility] [address] [address] [address] [facility] [address]  Phone: [phone] Fax: [phone]    [facility] - [place], [place] - [address]  [address] [address]  Phone: [phone] Fax: [phone]    Primary Care Provider: Oswaldo Prather MD    Primary Insurance: MEDICARE  Secondary Insurance: BLUE CROSS    DISCHARGE DETAILS:    Discharge planning discussed with:: Patient  Freedom of Choice: Yes  Comments - Freedom of Choice: Pt would like to go to [facility]. He is not interested in another facility nor home health if he would be denied from [facility] in [place]  CM contacted family/caregiver?: Yes  Were Treatment Team discharge recommendations reviewed with patient/caregiver?: Yes          Contacts  Patient Contacts: Dottie [place] (Spouse)  Relationship to Patient:: Family  Contact Method: Phone  Phone Number: [phone]  Reason/Outcome: Discharge Planning, Referral    Requested Home Health Care         Is the patient interested in HHC at discharge?: No (pt reports not being interested in home therapy if denied for acute rehab)    DME Referral Provided  Referral made for DME?: No    Other Referral/Resources/Interventions Provided:  Interventions: Acute Rehab  Referral Comments: Pt reports [facility] being [facility] [place]. Pt is not interested in another facility or home health         Treatment Team Recommendation: Short Term Rehab, Acute Rehab                                             Additional Comments: CM met with pt at the bedside while wife was on the phone. Therapy recommendation reviewed. Pt reports that he would like to go to [facility] in [place] where he has been before. CM did explain that he might not have enough medical need for acute and that we should look at sub-acute options as back up. Pt report that he is not interested in going to any other rehab facility aside from the acute in [place]. CM asked about home therapy if he would be denied from the [facility]. Pt reports that he does not need therapy in the home as he knows the excersises that need to be done. CM addressed the fact that pt was an assist x2 with therapy today. Pt reports he will manage with his wife if he were to be denied from the [facility] in [place] as that remains the only place he is willing to go due to poor experience at prior SNF level rehab. Transportation also discussed. Pt was inquiring about an ambulance transport. CM explained that he does not have a qualifying need for a BLS transport and that insurance would likely not cover this form of transportation. CM explained that the cheapest form of transporation would be a [facility] but pt reports that he does not want to pay for any form of transport as he has multple bills from his previous admissions and would work with his wife on getting to the rehab and/or home as he did last time. CM will continue to follow.

## 2024-09-20 NOTE — PHYSICAL THERAPY NOTE
PT PROGRESS NOTE    Name: Aaron Richards  AGE: 59 y.o.  MRN: [id number]  LENGTH OF STAY: 3     09/20/24 1517   PT Last Visit   PT Visit Date 09/20/24   Note Type   Note Type BID visit/treatment   Pain Assessment   Pain Assessment Tool 0-10   Pain Score 9   Pain Location/Orientation Orientation: Right;Location: Knee   Hospital Pain Intervention(s) Cold applied;Repositioned;Ambulation/increased activity;Elevated;Emotional support;Rest   Restrictions/Precautions   Weight Bearing Precautions Per Order Yes   RLE Weight Bearing Per Order WBAT   Other Precautions Multiple lines;Fall Risk;Pain   General   Chart Reviewed Yes   Response to Previous Treatment Other (Comment)  (complaining of inc pain following last session 2* performing one sit<>stand.)   Family/Caregiver Present No   Cognition   Overall Cognitive Status WFL   Arousal/Participation Alert   Attention Attends with cues to redirect   Orientation Level Oriented X4   Memory Within functional limits   Following Commands Follows one step commands with increased time or repetition   Subjective   Subjective I will only do bed level exercise. I know my body.   Bed Mobility   Additional Comments Pt declining functional mobility 2* pain.   Endurance Deficit   Endurance Deficit Yes   Endurance Deficit Description pain   Activity Tolerance   Activity Tolerance Patient limited by pain;Other (Comment)  (self limiting)   Nurse Made Aware RN Jessie   Exercises   Quad Sets Supine;20 reps;AROM;Right   Heelslides Supine;20 reps;AROM;Right   Hip Abduction Supine;20 reps;AROM;Right   Hip Adduction Supine;20 reps;AROM;Right   Knee AROM Short Arc Quad Supine;20 reps;AROM;Right   Assessment   Prognosis Good   Problem List Decreased strength;Decreased range of motion;Decreased endurance;Decreased mobility;Impaired balance;Pain   Assessment Patient was seen today per POC. Pt refusing OOB mobility 2* pain however agreeable to bed level interventions. Pain 9/10 in R knee. Good  tolerance to therapeutic exercises with appropriate fatigue however inc pain throughout. Provided ice at end of session. Mild dizziness and nausea throughout session. BP stable. RN notified. Will continue to see pt per POC as tolerated. From PT standpoint continued recommendation for level II, (moderate resource intensity) at D/C when medically cleared based current function. The patient's AM-[facility] Basic Mobility Inpatient Short Form Raw Score is 17. A Raw score of greater than 16 suggests the patient may benefit from discharge to home. Please also refer to the recommendation of the Physical Therapist for safe discharge planning. Nsg staff to continue to mobilized pt (OOB in chair for all meals & ambulate in room/unit) as tolerated to prevent further decline in function. Nsg staff notified. From PT stand point, pt cleared functionally for D/C when medically appropriate.   Goals   Patient Goals to have less pain   STG Expiration Date 09/27/24   PT Treatment Day 1   Plan   Treatment/Interventions Functional transfer training;LE strengthening/ROM;Elevations;Therapeutic exercise;Endurance training;Patient/family training;Bed mobility;Gait training;Continued evaluation;Spoke to nursing;OT   Progress Slow progress, decreased activity tolerance   PT Frequency Twice a day   Discharge Recommendation   Rehab Resource Intensity Level, PT II (Moderate Resource Intensity)   AM-PAC Basic Mobility Inpatient   Turning in Flat Bed Without Bedrails 4   Lying on Back to Sitting on Edge of Flat Bed Without Bedrails 4   Moving Bed to Chair 3   Standing Up From Chair Using Arms 2   Walk in Room 2   Climb 3-5 Stairs With Railing 2   Basic Mobility Inpatient Raw Score 17   Basic Mobility Standardized Score 39.67   [facility] Highest Level Of Mobility   -HLM Goal 5: Stand one or more mins   -[facility] Achieved 2: Bed activities/Dependent transfer   Education   Education Provided Mobility training;Home exercise program   Patient Reinforcement  needed   End of Consult   Patient Position at End of Consult Supine;All needs within reach         Ana Machuca, PT

## 2024-09-20 NOTE — PROGRESS NOTES
Progress Note - Hospitalist   Name: Aaron Richards 59 y.o. male I MRN: [id number]  Unit/Bed#: E2 -01 I Date of Admission: 9/17/2024   Date of Service: 9/20/2024 I Hospital Day: 3    Assessment & Plan  Periprosthetic fracture around internal prosthetic right knee joint  Had repair yesterday.    Pain is moderately well controlled    Preoperative cardiovascular examination  Patient never had any heart issues.  No history of heart attack.  No history of congestive heart failure.    He states that last week he did do strenuous activity at physical therapy.  He did 10 minutes on an exercise bike with arm movements.    Due to his knee he cannot walk up a flight of stairs.    During this heavy activity at physical therapy he had no chest pain.  No dyspnea on exertion.  No shortness of breath.    This likely was greater than 4 METS without any cardiac issues    He is okay to go to surgery without any further cardiac workup.  Risk is due to anesthesia and the procedure itself.  Patient is accepting of these risks and wishes to proceed.  Risk would be less if spinal anesthesia was used, but it may not be possible depending on how complicated this procedure is.  Patient understands that as well and is accepting of all risks.  Type 2 diabetes mellitus with diabetic peripheral angiopathy without gangrene (HCC)  Lab Results   Component Value Date    HGBA1C 6.2 (H) 07/01/2024       Recent Labs     09/19/24  2126 09/20/24  0624 09/20/24  1109 09/20/24  1607   POCGLU 303* 252* 225* 192*       Blood Sugar Average: Last 72 hrs:  (P) [id number]    Blood sugars are coming under control    Stage 3b chronic kidney disease (HCC)  Lab Results   Component Value Date    EGFR 95 09/20/2024    EGFR 94 09/17/2024    EGFR 100 08/12/2024    CREATININE 0.82 09/20/2024    CREATININE 0.83 09/17/2024    CREATININE 0.80 08/12/2024   Cr is at baseline      VTE Pharmacologic Prophylaxis:                 Current Length of Stay: 3  day(s)  Current Patient Status: Inpatient   Certification Statement:     Discharge Plan:         Subjective    Complains of knee pain but pain meds are helping    No fever or chills.    Objective     Vitals:   Temp (24hrs), Av.1 °F (36.7 °C), Min:97.7 °F (36.5 °C), Max:98.8 °F (37.1 °C)    Temp:  [97.7 °F (36.5 °C)-98.8 °F (37.1 °C)] 98.8 °F (37.1 °C)  HR:  [84-99] 96  Resp:  [18] 18  BP: (101-127)/(63-84) 118/68  SpO2:  [98 %-100 %] 98 %  Body mass index is 29.25 kg/m².         Physical Exam  Vitals and nursing note reviewed.   Constitutional:       General: He is not in acute distress.     Appearance: He is well-developed.   HENT:      Head: Normocephalic and atraumatic.   Eyes:      Conjunctiva/sclera: Conjunctivae normal.   Cardiovascular:      Rate and Rhythm: Normal rate and regular rhythm.      Heart sounds: No murmur heard.  Pulmonary:      Effort: Pulmonary effort is normal. No respiratory distress.      Breath sounds: Normal breath sounds.   Abdominal:      Palpations: Abdomen is soft.      Tenderness: There is no abdominal tenderness.   Musculoskeletal:         General: No swelling.      Cervical back: Neck supple.      Right lower leg: No edema.      Left lower leg: No edema.      Comments: Right knee dressing C/D/I   Skin:     General: Skin is warm and dry.      Capillary Refill: Capillary refill takes less than 2 seconds.   Neurological:      Mental Status: He is alert.   Psychiatric:         Mood and Affect: Mood normal.           Lab results  Results from last 7 days   Lab Units 24  0611 24  1354   WBC Thousand/uL 18.49* 12.92*   HEMOGLOBIN g/dL 9.6* 12.6   PLATELETS Thousands/uL 199 237   MCV fL 92 90   INR   --  1.08     Results from last 7 days   Lab Units 24  0611 24  1354   SODIUM mmol/L 133* 136   POTASSIUM mmol/L 4.6 4.3   CHLORIDE mmol/L 104 105   CO2 mmol/L 22 22   ANION GAP mmol/L 7 9   BUN mg/dL 24 28*   CREATININE mg/dL 0.82 0.83   CALCIUM mg/dL 8.5 9.3    ALBUMIN g/dL  --  3.8   TOTAL BILIRUBIN mg/dL  --  0.57   ALK PHOS U/L  --  138*   ALT U/L  --  18   AST U/L  --  23   EGFR ml/min/1.73sq m 95 94   GLUCOSE RANDOM mg/dL 290* 107     Results from last 7 days   Lab Units 09/20/24  0611   MAGNESIUM mg/dL 1.6*             Last 24 Hours Medication List:     Current Facility-Administered Medications:     acetaminophen (TYLENOL) tablet 975 mg, Q8H    ascorbic acid (VITAMIN C) tablet 500 mg, BID    aspirin (ECOTRIN LOW STRENGTH) EC tablet 81 mg, BID    atorvastatin (LIPITOR) tablet 40 mg, Daily With Dinner    calcium carbonate (TUMS) chewable tablet 1,000 mg, Daily PRN    ceFAZolin (ANCEF) IVPB (premix in dextrose) 2,000 mg 50 mL, Q8H, Last Rate: 2,000 mg (09/20/24 1436)    Cholecalciferol (VITAMIN D3) tablet 2,000 Units, Daily    docusate sodium (COLACE) capsule 100 mg, BID PRN    docusate sodium (COLACE) capsule 100 mg, BID    escitalopram (LEXAPRO) tablet 5 mg, Daily    folic acid (FOLVITE) tablet 1 mg, Daily    gabapentin (NEURONTIN) capsule 300 mg, HS    HYDROcodone-acetaminophen (NORCO) 5-325 mg per tablet 1 tablet, Q4H PRN    HYDROmorphone (DILAUDID) injection 0.5 mg, Q2H PRN    insulin glargine (LANTUS) subcutaneous injection 25 Units 0.25 mL, Q12H TESS    insulin lispro (HumALOG/ADMELOG) 100 units/mL subcutaneous injection 1-6 Units, 4x Daily (AC & HS) **AND** [PENDING] insulin lispro (HumALOG/ADMELOG) 100 units/mL subcutaneous injection 1-6 Units, 4x Daily (AC & HS) **AND** Fingerstick Glucose (POCT), 4x Daily AC and at bedtime **AND** [PENDING] Fingerstick Glucose (POCT), 4x Daily AC and at bedtime    lactated ringers bolus 1,000 mL, Once PRN **AND** lactated ringers bolus 1,000 mL, Once PRN    lactated ringers infusion, Continuous, Last Rate: 100 mL/hr (09/19/24 1812)    lactated ringers infusion, Continuous, Last Rate: 125 mL/hr (09/19/24 1325)    levothyroxine tablet 175 mcg, Early Morning    multivitamin-minerals (CENTRUM) tablet 1 tablet, Daily     ondansetron (ZOFRAN) injection 4 mg, Q6H PRN    pantoprazole (PROTONIX) EC tablet 40 mg, Early Morning    senna (SENOKOT) tablet 8.6 mg, Daily    simethicone (MYLICON) chewable tablet 80 mg, 4x Daily PRN    sodium chloride 0.9 % bolus 1,000 mL, Once PRN **AND** sodium chloride 0.9 % bolus 1,000 mL, Once PRN    traMADol (ULTRAM) tablet 50 mg, Q4H PRN

## 2024-09-20 NOTE — PLAN OF CARE
Problem: PHYSICAL THERAPY ADULT  Goal: Performs mobility at highest level of function for planned discharge setting.  See evaluation for individualized goals.  Description: Treatment/Interventions: LE strengthening/ROM, Functional transfer training, Elevations, Therapeutic exercise, Endurance training, Patient/family training, Bed mobility, Gait training, Spoke to nursing, OT          See flowsheet documentation for full assessment, interventions and recommendations.  9/20/2024 1601 by Ana Machuca, PT  Note: Prognosis: Good  Problem List: Decreased strength, Decreased range of motion, Decreased endurance, Decreased mobility, Impaired balance, Pain  Assessment: Patient was seen today per POC. Pt refusing OOB mobility 2* pain however agreeable to bed level interventions. Pain 9/10 in R knee. Good tolerance to therapeutic exercises with appropriate fatigue however inc pain throughout. Provided ice at end of session. Mild dizziness and nausea throughout session. BP stable. RN notified. Will continue to see pt per POC as tolerated. From PT standpoint continued recommendation for level II, (moderate resource intensity) at D/C when medically cleared based current function. The patient's AM-PAC Basic Mobility Inpatient Short Form Raw Score is 17. A Raw score of greater than 16 suggests the patient may benefit from discharge to home. Please also refer to the recommendation of the Physical Therapist for safe discharge planning. Nsg staff to continue to mobilized pt (OOB in chair for all meals & ambulate in room/unit) as tolerated to prevent further decline in function. Nsg staff notified. From PT stand point, pt cleared functionally for D/C when medically appropriate.  Barriers to Discharge: Inaccessible home environment     Rehab Resource Intensity Level, PT: II (Moderate Resource Intensity)    See flowsheet documentation for full assessment.

## 2024-09-20 NOTE — OCCUPATIONAL THERAPY NOTE
Occupational Therapy Evaluation Only     Patient Name: Aaron Richards  Today's Date: 9/20/2024  Problem List  Principal Problem:    Periprosthetic fracture around internal prosthetic right knee joint  Active Problems:    Preoperative cardiovascular examination    Type 2 diabetes mellitus with diabetic peripheral angiopathy without gangrene (HCC)    Stage 3b chronic kidney disease (HCC)    Past Medical History  Past Medical History:   Diagnosis Date    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    Chronic kidney disease     Colon polyp     Constipation 03/09/2023    Diabetes mellitus (HCC)     Disease of thyroid gland     GERD (gastroesophageal reflux disease)     HHS vs DKA 11/16/2018    Hyperlipidemia     Hypertension     Thyroid cancer (HCC)      Past Surgical History  Past Surgical History:   Procedure Laterality Date    COLONOSCOPY      FOOT AMPUTATION Right     FOOT SURGERY Right 2014    IR AORTAGRAM WITH RUN-OFF  08/06/2020    IR TUNNELED CENTRAL LINE PLACEMENT  09/08/2020    IR TUNNELED CENTRAL LINE REMOVAL  09/28/2020    NM AMPUTATION FOOT TRANSMETARSAL Right 09/03/2020    Procedure: AMPUTATION TRANSMETATARSAL (TMA);  Surgeon: Tracy Murillo DPM;  [facility];  Service: Podiatry    NM ARTHRP KNE CONDYLE&PLATU MEDIAL&LAT COMPARTMENTS Right 7/15/2024    Procedure: ARTHROPLASTY KNEE TOTAL;  Surgeon: Jorge Hendrix DO;  Location[facility];  Service: Orthopedics    NM OPEN TX TIBIAL FRACTURE PROXIMAL UNICONDYLAR Right 5/1/2023    Procedure: OPEN REDUCTION W/ INTERNAL FIXATION (ORIF) RIGHT TIBIAL PLATEAU NONUNION;  Surgeon: Sesar Hilario MD;  [facility];  Service: Orthopedics    NM REMOVAL IMPLANT DEEP Right 1/22/2024    Procedure: REMOVAL HARDWARE TIBIA;  Surgeon: Sesar Hilario MD;  [facility];  Service: Orthopedics    NM THYROIDECTOMY TOTAL/COMPLETE N/A 02/03/2021    Procedure: TOTAL THYROIDECTOMY WITH NIMS MONITORING;  Surgeon: Sesar Garvey MD;   [facility]: [facility];  Service: ENT    TOE AMPUTATION Right 08/11/2020    Procedure: AMPUTATION TOE;  Surgeon: Tracy Murillo DPM;  [facility];  Service: Podiatry    US GUIDED THYROID BIOPSY  07/02/2020 09/20/24 1011   OT Last Visit   OT Visit Date 09/20/24   Note Type   Note type Evaluation   Additional Comments pt greeted in supine, agreeable to OT evaluation   Pain Assessment   Pain Assessment Tool 0-10   Pain Score 10 - Worst Possible Pain   Pain Location/Orientation Orientation: Right;Location: Knee   Hospital Pain Intervention(s) Repositioned;Elevated;Emotional support;Rest   Restrictions/Precautions   Weight Bearing Precautions Per Order Yes   RLE Weight Bearing Per Order WBAT   Other Precautions WBS;Fall Risk;Pain   Home Living   Type of Home House  (ranch)   Home Layout One level;Stairs to enter with rails;Able to live on main level with bedroom/bathroom;Performs ADLs on one level  (3+1 DANIEL with handrail)   Bathroom Shower/Tub Tub/shower unit   Bathroom Toilet Raised   Bathroom Equipment Commode;Toilet raiser;Grab bars around toilet;Grab bars in shower;Tub transfer bench   Bathroom Accessibility Accessible   Home Equipment Walker;Cane;Wheelchair-manual;Other (Comment)  (transport chair)   Prior Function   Level of [place] Independent with ADLs;Independent with functional mobility;Needs assistance with IADLS   Lives With Spouse   Receives Help From Family   IADLs Independent with medication management;Family/Friend/Other provides transportation;Family/Friend/Other provides meals   Falls in the last 6 months 1 to 4   Vocational Retired   Comments Pt reports spouse assists with transportation, laundry, meals, and cleaning. Pt independent with all ADLs and medication management.   Lifestyle   Autonomy Pt reports spouse assists with transportation, laundry, meals, and cleaning. Pt independent with all ADLs and medication management.   Reciprocal Relationships Spouse   Service to Others  "Retired   General   Family/Caregiver Present No   Subjective   Subjective \"I dont need OT\"   ADL   Where Assessed Edge of bed   Eating Assistance 6  Modified independent   Grooming Assistance 5  Supervision/Setup   UB Bathing Assistance 6  Modified Independent   LB Bathing Assistance 5  Supervision/Setup   UB Dressing Assistance 6  Modified independent   LB Dressing Assistance 5  Supervision/Setup   Toileting Assistance  5  Supervision/Setup   Bed Mobility   Supine to Sit 5  Supervision   Additional items Increased time required;Verbal cues   Sit to Supine 5  Supervision   Additional items Increased time required;Verbal cues   Additional Comments Pt greeted in supine, ended session in supine. BP EOB: 138/59.   Transfers   Sit to Stand 4  Minimal assistance   Additional items Assist x 2;Increased time required;Verbal cues;HOB elevated  (RW)   Stand to Sit 4  Minimal assistance   Additional items Assist x 2;Increased time required;Verbal cues;HOB elevated  (Rw)   Additional Comments verbal cues for hand placement, BP after standing 105/61 (+) dizziness and lightheadedness   Functional Mobility   Functional Mobility   (unable to assess, pt refusing to complete functional mobility at this time)   Balance   Static Sitting Good   Dynamic Sitting Fair +   Static Standing Fair   Activity Tolerance   Activity Tolerance Patient limited by pain;Treatment limited secondary to medical complications (Comment)  (dizziness/lightheadedness)   Medical Staff Made Aware PT Ana, Pt seen for co-evaluation/treatment with skilled Physical Therapy due to pt's medical complexity, decreased endurance, overall functional level, overall safety, and post surgical day #0.   Nurse Made Aware BONI SALEH Assessment   RUE Assessment WFL   LUE Assessment   LUE Assessment WFL   Hand Function   Gross Motor Coordination Functional   Fine Motor Coordination Functional   Cognition   Overall Cognitive Status WFL   Arousal/Participation " Alert;Cooperative   Attention Attends with cues to redirect   Orientation Level Oriented X4   Memory Within functional limits   Following Commands Follows one step commands with increased time or repetition   Comments Cooperative, Pt reporting increased pain and lightheaded/dizziness, refusing to complete functional mobility at this time.   Assessment   Prognosis Good   Assessment Pt is a 59 y.o. male seen for OT evaluation s/p adm [facility] on [facility]17[facility] w/ Periprosthetic fracture around internal prosthetic right knee joint . Comorbidities affecting pt’s functional performance include a significant PMH of CKD, DM, GERD, disease of thyroid gland, hyperlipidemia, HTN, thyroid cancer, PAD, MARKELL, MANNY, diabetic neuropathy. Pt with active OT orders and activity orders for Activity beginning POD #1. WBAT RLE. Pt lives in a ranch style house with spouse and 3+1 DANIEL with handrails. Pt has tub/shower with transfer tub bench and raised toilets with BSC and toilet raiser. At baseline, pt was independent with all ADLs and needed assistance with IADLs. (-) . Pt completed supine to sit with supervision. Pt completed don of shoes with mod I seated EOB. Pt completed sit to stand with minAx2 and use of RW. Pt refused to completed further functional mobility. Pt reporting dizziness/lightheadedness during session, RN notified. Upon evaluation, pt currently requires mod I for UB ADLs, supervision for LB ADLs, supervision for toileting, supervision for bed mobility, and La for transfers 2* the following deficits impacting occupational performance: weakness, decreased strength , decreased balance, decreased activity tolerance, increased pain, orthopedic restrictions, and dizziness. Pt with the following personal factors of: DANIEL home environment, difficulty performing ADLs, difficulty performing IADLs, difficulty performing transfers/mobility, WBS, and fall risk . Despite above mentioned deficits and personal factors,  pt is functioning near baseline level of performance. Pt reporting he is independent with all ADLs at this time and does not need OT. Pt educated on the benefits of OT in acute care setting and while in the hospital. Limited ADL deficits. Based on the aforementioned OT evaluation, functional performance deficits, and assessments, pt has been identified as a high complexity evaluation. No further acute OT needs identified at this time. From OT standpoint, recommend no post acute rehabilitation needs when medically cleared. D/C pt from OT caseload at this time.     Pt lying supine at end of session. Call bell and phone within reach. All needs met and pt reports no further questions for OT at this time.   Plan   OT Frequency Eval only   Discharge Recommendation   Rehab Resource Intensity Level, OT No post-acute rehabilitation needs   Additional Comments  The patient's raw score on the AM-PAC Daily Activity Inpatient Short Form is 23 . A raw score of greater than or equal to 19 suggests the patient may benefit from discharge to home. Please refer to the recommendation of the Occupational Therapist for safe discharge planning.   AM-PAC Daily Activity Inpatient   Lower Body Dressing 3   Bathing 4   Toileting 4   Upper Body Dressing 4   Grooming 4   Eating 4   Daily Activity Raw Score 23   Daily Activity Standardized Score (Calc for Raw Score >=11) 51.12   AM-PAC Applied Cognition Inpatient   Following a Speech/Presentation 4   Understanding Ordinary Conversation 4   Taking Medications 4   Remembering Where Things Are Placed or Put Away 4   Remembering List of 4-5 Errands 4   Taking Care of Complicated Tasks 4   Applied Cognition Raw Score 24   Applied Cognition Standardized Score 62.21   End of Consult   Education Provided Yes   Patient Position at End of Consult Supine;All needs within reach   Nurse Communication Nurse aware of consult   End of Consult Comments Pt lying supine at end of session. Call bell and phone  within reach. All needs met and pt reports no further questions for OT at this time.   Brionna Sabillon, OT

## 2024-09-21 LAB
ANION GAP SERPL CALCULATED.3IONS-SCNC: 6 MMOL/L (ref 4–13)
BASOPHILS # BLD MANUAL: 0 THOUSAND/UL (ref 0–0.1)
BASOPHILS NFR MAR MANUAL: 0 % (ref 0–1)
BUN SERPL-MCNC: 19 MG/DL (ref 5–25)
CALCIUM SERPL-MCNC: 8.5 MG/DL (ref 8.4–10.2)
CHLORIDE SERPL-SCNC: 104 MMOL/L (ref 96–108)
CO2 SERPL-SCNC: 26 MMOL/L (ref 21–32)
CREAT SERPL-MCNC: 0.66 MG/DL (ref 0.6–1.3)
EOSINOPHIL # BLD MANUAL: 0 THOUSAND/UL (ref 0–0.4)
EOSINOPHIL NFR BLD MANUAL: 0 % (ref 0–6)
ERYTHROCYTE [DISTWIDTH] IN BLOOD BY AUTOMATED COUNT: 12.8 % (ref 11.6–15.1)
GFR SERPL CREATININE-BSD FRML MDRD: 104 ML/MIN/1.73SQ M
GLUCOSE SERPL-MCNC: 135 MG/DL (ref 65–140)
GLUCOSE SERPL-MCNC: 154 MG/DL (ref 65–140)
GLUCOSE SERPL-MCNC: 160 MG/DL (ref 65–140)
GLUCOSE SERPL-MCNC: 225 MG/DL (ref 65–140)
GLUCOSE SERPL-MCNC: 258 MG/DL (ref 65–140)
HCT VFR BLD AUTO: 24.6 % (ref 36.5–49.3)
HGB BLD-MCNC: 8.2 G/DL (ref 12–17)
LYMPHOCYTES # BLD AUTO: 16 % (ref 14–44)
LYMPHOCYTES # BLD AUTO: 2.03 THOUSAND/UL (ref 0.6–4.47)
MAGNESIUM SERPL-MCNC: 1.8 MG/DL (ref 1.9–2.7)
MCH RBC QN AUTO: 31.1 PG (ref 26.8–34.3)
MCHC RBC AUTO-ENTMCNC: 33.3 G/DL (ref 31.4–37.4)
MCV RBC AUTO: 93 FL (ref 82–98)
MONOCYTES # BLD AUTO: 1.14 THOUSAND/UL (ref 0–1.22)
MONOCYTES NFR BLD: 9 % (ref 4–12)
NEUTROPHILS # BLD MANUAL: 9.5 THOUSAND/UL (ref 1.85–7.62)
NEUTS SEG NFR BLD AUTO: 75 % (ref 43–75)
PLATELET # BLD AUTO: 177 THOUSANDS/UL (ref 149–390)
PLATELET BLD QL SMEAR: ADEQUATE
PMV BLD AUTO: 11.5 FL (ref 8.9–12.7)
POTASSIUM SERPL-SCNC: 4.3 MMOL/L (ref 3.5–5.3)
RBC # BLD AUTO: 2.64 MILLION/UL (ref 3.88–5.62)
RBC MORPH BLD: NORMAL
SODIUM SERPL-SCNC: 136 MMOL/L (ref 135–147)
WBC # BLD AUTO: 12.66 THOUSAND/UL (ref 4.31–10.16)

## 2024-09-21 PROCEDURE — 83735 ASSAY OF MAGNESIUM: CPT | Performed by: HOSPITALIST

## 2024-09-21 PROCEDURE — 99232 SBSQ HOSP IP/OBS MODERATE 35: CPT | Performed by: HOSPITALIST

## 2024-09-21 PROCEDURE — 99024 POSTOP FOLLOW-UP VISIT: CPT | Performed by: PHYSICIAN ASSISTANT

## 2024-09-21 PROCEDURE — 82948 REAGENT STRIP/BLOOD GLUCOSE: CPT

## 2024-09-21 PROCEDURE — 85027 COMPLETE CBC AUTOMATED: CPT | Performed by: HOSPITALIST

## 2024-09-21 PROCEDURE — 85007 BL SMEAR W/DIFF WBC COUNT: CPT | Performed by: HOSPITALIST

## 2024-09-21 PROCEDURE — 97110 THERAPEUTIC EXERCISES: CPT

## 2024-09-21 PROCEDURE — 80048 BASIC METABOLIC PNL TOTAL CA: CPT | Performed by: PHYSICIAN ASSISTANT

## 2024-09-21 PROCEDURE — 97530 THERAPEUTIC ACTIVITIES: CPT

## 2024-09-21 RX ORDER — METOCLOPRAMIDE HYDROCHLORIDE 5 MG/ML
10 INJECTION INTRAMUSCULAR; INTRAVENOUS EVERY 6 HOURS PRN
Status: DISCONTINUED | OUTPATIENT
Start: 2024-09-21 | End: 2024-09-23 | Stop reason: HOSPADM

## 2024-09-21 RX ORDER — CELECOXIB 100 MG/1
100 CAPSULE ORAL 2 TIMES DAILY
Status: DISCONTINUED | OUTPATIENT
Start: 2024-09-21 | End: 2024-09-23 | Stop reason: HOSPADM

## 2024-09-21 RX ADMIN — FOLIC ACID 1 MG: 1 TABLET ORAL at 08:35

## 2024-09-21 RX ADMIN — INSULIN LISPRO 6 UNITS: 100 INJECTION, SOLUTION INTRAVENOUS; SUBCUTANEOUS at 21:43

## 2024-09-21 RX ADMIN — ACETAMINOPHEN 325MG 975 MG: 325 TABLET ORAL at 18:03

## 2024-09-21 RX ADMIN — INSULIN LISPRO 2 UNITS: 100 INJECTION, SOLUTION INTRAVENOUS; SUBCUTANEOUS at 16:36

## 2024-09-21 RX ADMIN — CEFAZOLIN SODIUM 2000 MG: 2 SOLUTION INTRAVENOUS at 18:04

## 2024-09-21 RX ADMIN — INSULIN LISPRO 4 UNITS: 100 INJECTION, SOLUTION INTRAVENOUS; SUBCUTANEOUS at 11:46

## 2024-09-21 RX ADMIN — OXYCODONE HYDROCHLORIDE AND ACETAMINOPHEN 500 MG: 500 TABLET ORAL at 08:35

## 2024-09-21 RX ADMIN — INSULIN GLARGINE 25 UNITS: 100 INJECTION, SOLUTION SUBCUTANEOUS at 08:35

## 2024-09-21 RX ADMIN — INSULIN GLARGINE 25 UNITS: 100 INJECTION, SOLUTION SUBCUTANEOUS at 21:43

## 2024-09-21 RX ADMIN — ATORVASTATIN CALCIUM 40 MG: 40 TABLET, FILM COATED ORAL at 16:36

## 2024-09-21 RX ADMIN — Medication 1 TABLET: at 08:35

## 2024-09-21 RX ADMIN — CELECOXIB 100 MG: 100 CAPSULE ORAL at 08:35

## 2024-09-21 RX ADMIN — Medication 2000 UNITS: at 08:34

## 2024-09-21 RX ADMIN — ASPIRIN 81 MG: 81 TABLET, COATED ORAL at 18:04

## 2024-09-21 RX ADMIN — CELECOXIB 100 MG: 100 CAPSULE ORAL at 18:04

## 2024-09-21 RX ADMIN — OXYCODONE HYDROCHLORIDE AND ACETAMINOPHEN 500 MG: 500 TABLET ORAL at 18:04

## 2024-09-21 RX ADMIN — ASPIRIN 81 MG: 81 TABLET, COATED ORAL at 08:34

## 2024-09-21 RX ADMIN — ACETAMINOPHEN 325MG 975 MG: 325 TABLET ORAL at 10:51

## 2024-09-21 RX ADMIN — ESCITALOPRAM OXALATE 5 MG: 10 TABLET ORAL at 08:34

## 2024-09-21 NOTE — PHYSICAL THERAPY NOTE
PHYSICAL THERAPY NOTE          Patient Name: Aaron Richards  Today's Date: 9/21/2024 09/21/24 2122   Note Type   Note Type Treatment   Pain Assessment   Pain Assessment Tool 0-10   Pain Score 6   Pain Location/Orientation Orientation: Right;Location: Knee   Hospital Pain Intervention(s) Repositioned;Ambulation/increased activity;Emotional support;Rest;Cold applied   Restrictions/Precautions   RLE Weight Bearing Per Order WBAT   Other Precautions Fall Risk;Pain;Bed Alarm;Chair Alarm   General   Chart Reviewed Yes   Family/Caregiver Present No   Bed Mobility   Supine to Sit 7  Independent  (supine to longsitting x 5 during PT session.)   Additional items Assist x 1   Sit to Supine 7  Independent  (longsitting to supine 5x during PT session.)   Additional Comments pt declinine sitting EOB or standign trials due to profuse bleeing from incisional site of R TKR.   Transfers   Additional Comments pt declined due to profuse bleeding from incision of R TKR.   Ambulation/Elevation   Ambulation/Elevation Additional Comments pt declining due to profuse bleedin from incision of R TKR.   Balance   Static Sitting   (longitting in bed good)   Activity Tolerance   Activity Tolerance Treatment limited secondary to medical complications (Comment)  (profuse bleeding through bandages and ace wrap.)   Medical Staff Made Aware Zayra PLATA   Nurse Made Aware yes   Exercises   Quad Sets Supine;20 reps;AROM;Bilateral   Heelslides Supine;20 reps;AROM;Right   Hip Flexion Supine;15 reps;AAROM;Right  (slr)   Hip Abduction Supine;15 reps;AROM;Bilateral  (15 reps x2)   Hip Adduction Supine;15 reps;AROM;Bilateral  (15 reps x2)   Knee AROM Short Arc Quad 15 reps;Supine;AROM;Left   Ankle Pumps Supine;15 reps;AROM;Right   Heel Cord Stretch Supine;PROM;Right  (passive HC stretching to R le  5 reps  x 10 second hold.)   Assessment   Prognosis Good   Problem List  "Decreased strength;Decreased endurance;Impaired balance;Decreased mobility;Decreased skin integrity;Decreased range of motion   Assessment Pt seen for PT treatment session this date with interventions consisting of bed mobility and HEP, and education provided as needed for safety and direction to improve functional mobility, safety awareness, and activity tolerance. Pt agreeable to PT treatment session upon arrival, pt found supine in bed . At end of session, pt left  supine in bed with all needs in reach. In comparison to previous session, pt with no improvement activity tolerance, endurance, standing balance, ambulation distances, ambulatory balance, AM- pac score, and functional mobility. Pt  performs supine R le a-aarom for TKR   x 15- 20 reps to tolerance.  Pt tolerated well until profuse bleeding occurred from incisional site of R TKR.  Pt  became tearful and stated, \" Now what I am tired of all these complications.  This is frustrating. I can' t seem to make may progress.\"   Pt declined transfers and or standing activities at this time due to bleeding from incisional site of R TKR.   Pt given emotional support and reassurance.  Pt's RN, Zayra made aware of profuse bleeding from incisional area.  Bleeding lessened after knee flexion exercises stopped. Attempt to progress to out of bed and ambulation next session.   Continue to recommend  level II rehab resource intensity  at time of d/c in order to maximize pt's functional independence and safety w/ mobility. Pt continues to be functioning below baseline level. PT will continue to see pt while here in order to address the deficits listed above and provide interventions consistent w/ POC in effort to achieve STGs.    The patient's AM-PAC Basic Mobility Inpatient Short Form Raw Score is 16. A raw score less than or equal to 16 suggests the patient may benefit from discharge to post-acute rehabilitation services. Please also refer to the recommendation of the " Physical Therapist for safe discharge planning.   Goals   Patient Goals To be able to get in to my house.   STG Expiration Date 09/27/24   PT Treatment Day 2   Plan   Treatment/Interventions LE strengthening/ROM;Therapeutic exercise;Bed mobility;Spoke to nursing;Patient/family training   Progress Slow progress, medical status limitations  (profuse bleeding through bandages and ace wrap.)   PT Frequency Twice a day   Discharge Recommendation   Rehab Resource Intensity Level, PT II (Moderate Resource Intensity)   AM-PAC Basic Mobility Inpatient   Turning in Flat Bed Without Bedrails 4   Lying on Back to Sitting on Edge of Flat Bed Without Bedrails 4   Moving Bed to Chair 2   Standing Up From Chair Using Arms 2   Walk in Room 2   Climb 3-5 Stairs With Railing 2   Basic Mobility Inpatient Raw Score 16   Basic Mobility Standardized Score 38.32   [facility] Highest Level Of Mobility   -[facility] Goal 5: Stand one or more mins   -[facility] Achieved 2: Bed activities/Dependent transfer   Education   Education Provided Home exercise program;Mobility training   Patient Demonstrates verbal understanding   End of Consult   Patient Position at End of Consult Supine;Bed/Chair alarm activated;All needs within reach        09/21/24 2565   Note Type   Note Type Treatment   Pain Assessment   Pain Assessment Tool 0-10   Pain Score 6   Pain Location/Orientation Orientation: Right;Location: Knee   Hospital Pain Intervention(s) Repositioned;Ambulation/increased activity;Emotional support;Rest;Cold applied   Restrictions/Precautions   RLE Weight Bearing Per Order WBAT   Other Precautions Fall Risk;Pain;Bed Alarm;Chair Alarm   General   Chart Reviewed Yes   Family/Caregiver Present No   Bed Mobility   Supine to Sit 7  Independent  (supine to longsitting x 5 during PT session.)   Additional items Assist x 1   Sit to Supine 7  Independent  (longsitting to supine 5x during PT session.)   Additional Comments pt declinine sitting EOB or standign  "trials due to profuse bleeing from incisional site of R TKR.   Transfers   Additional Comments pt declined due to profuse bleeding from incision of R TKR.   Ambulation/Elevation   Ambulation/Elevation Additional Comments pt declining due to profuse bleedin from incision of R TKR.   Balance   Static Sitting   (longitting in bed good)   Activity Tolerance   Activity Tolerance Treatment limited secondary to medical complications (Comment)  (profuse bleeding through bandages and ace wrap.)   Medical Staff Made Aware Zayra PLATA   Nurse Made Aware yes   Exercises   Quad Sets Supine;20 reps;AROM;Bilateral   Heelslides Supine;20 reps;AROM;Right   Hip Flexion Supine;15 reps;AAROM;Right  (slr)   Hip Abduction Supine;15 reps;AROM;Bilateral  (15 reps x2)   Hip Adduction Supine;15 reps;AROM;Bilateral  (15 reps x2)   Knee AROM Short Arc Quad 15 reps;Supine;AROM;Left   Ankle Pumps Supine;15 reps;AROM;Right   Heel Cord Stretch Supine;PROM;Right  (passive HC stretching to R le  5 reps  x 10 second hold.)   Assessment   Prognosis Good   Problem List Decreased strength;Decreased endurance;Impaired balance;Decreased mobility;Decreased skin integrity;Decreased range of motion   Assessment Pt seen for PT treatment session this date with interventions consisting of bed mobility and HEP, and education provided as needed for safety and direction to improve functional mobility, safety awareness, and activity tolerance. Pt agreeable to PT treatment session upon arrival, pt found supine in bed . At end of session, pt left  supine in bed with all needs in reach. In comparison to previous session, pt with no improvement activity tolerance, endurance, standing balance, ambulation distances, ambulatory balance, AM- pac score, and functional mobility. Pt  performs supine R le a-aarom for TKR   x 15- 20 reps to tolerance.  Pt tolerated well until profuse bleeding occurred from incisional site of R TKR.  Pt  became tearful and stated, \" Now what I am " "tired of all these complications.  This is frustrating. I can' t seem to make may progress.\"   Pt declined transfers and or standing activities at this time due to bleeding from incisional site of R TKR.   Pt given emotional support and reassurance.  Pt's RN, Zayra made aware of profuse bleeding from incisional area.  Bleeding lessened after knee flexion exercises stopped. Attempt to progress to out of bed and ambulation next session.   Continue to recommend  level II rehab resource intensity  at time of d/c in order to maximize pt's functional independence and safety w/ mobility. Pt continues to be functioning below baseline level. PT will continue to see pt while here in order to address the deficits listed above and provide interventions consistent w/ POC in effort to achieve STGs.    The patient's AM-[facility] Basic Mobility Inpatient Short Form Raw Score is 16. A raw score less than or equal to 16 suggests the patient may benefit from discharge to post-acute rehabilitation services. Please also refer to the recommendation of the Physical Therapist for safe discharge planning.   Goals   Patient Goals To be able to get in to my house.   STG Expiration Date 09/27/24   PT Treatment Day 2   Plan   Treatment/Interventions LE strengthening/ROM;Therapeutic exercise;Bed mobility;Spoke to nursing;Patient/family training   Progress Slow progress, medical status limitations  (profuse bleeding through bandages and ace wrap.)   PT Frequency Twice a day   Discharge Recommendation   Rehab Resource Intensity Level, PT II (Moderate Resource Intensity)   AM-PAC Basic Mobility Inpatient   Turning in Flat Bed Without Bedrails 4   Lying on Back to Sitting on Edge of Flat Bed Without Bedrails 4   Moving Bed to Chair 2   Standing Up From Chair Using Arms 2   Walk in Room 2   Climb 3-5 Stairs With Railing 2   Basic Mobility Inpatient Raw Score 16   Basic Mobility Standardized Score 38.32   [facility] Highest Level Of Mobility   [facility] Goal " 5: Stand one or more mins   -[facility] Achieved 2: Bed activities/Dependent transfer   Education   Education Provided Home exercise program;Mobility training   Patient Demonstrates verbal understanding   End of Consult   Patient Position at End of Consult Supine;Bed/Chair alarm activated;All needs within reach     Savita Prajapati, PTA

## 2024-09-21 NOTE — ASSESSMENT & PLAN NOTE
Lab Results   Component Value Date    HGBA1C 6.2 (H) 07/01/2024       Recent Labs     09/20/24  1109 09/20/24  1607 09/20/24  2101 09/21/24  0651   POCGLU 225* 192* 239* 135       Blood Sugar Average: Last 72 hrs:  (P) [id number]

## 2024-09-21 NOTE — ASSESSMENT & PLAN NOTE
Lab Results   Component Value Date    EGFR 104 09/21/2024    EGFR 95 09/20/2024    EGFR 94 09/17/2024    CREATININE 0.66 09/21/2024    CREATININE 0.82 09/20/2024    CREATININE 0.83 09/17/2024

## 2024-09-21 NOTE — ASSESSMENT & PLAN NOTE
Lab Results   Component Value Date    HGBA1C 6.2 (H) 07/01/2024       Recent Labs     09/20/24  1607 09/20/24  2101 09/21/24  0651 09/21/24  1132   POCGLU 192* 239* 135 225*       Blood Sugar Average: Last 72 hrs:  (P) [id number]    Blood sugar control is improving

## 2024-09-21 NOTE — ASSESSMENT & PLAN NOTE
Patient had repair  His pain is controlled.  He is working with PT    He wants to go to acute rehab in [place] if possible

## 2024-09-21 NOTE — ASSESSMENT & PLAN NOTE
Lab Results   Component Value Date    EGFR 104 09/21/2024    EGFR 95 09/20/2024    EGFR 94 09/17/2024    CREATININE 0.66 09/21/2024    CREATININE 0.82 09/20/2024    CREATININE 0.83 09/17/2024   Cr is stable

## 2024-09-21 NOTE — PLAN OF CARE
Problem: Potential for Falls  Goal: Patient will remain free of falls  Description: INTERVENTIONS:  - Educate patient/family on patient safety including physical limitations  - Instruct patient to call for assistance with activity   - Consult OT/PT to assist with strengthening/mobility   - Keep Call bell within reach  - Keep bed low and locked with side rails adjusted as appropriate  - Keep care items and personal belongings within reach  - Initiate and maintain comfort rounds  - Make Fall Risk Sign visible to staff  - Initiate/Maintain bed alarm  - Obtain necessary fall risk management equipment: bed alarm, call bell,  socks  - Apply yellow socks and bracelet for high fall risk patients  - Consider moving patient to room near nurses station  Outcome: Progressing     Problem: PAIN - ADULT  Goal: Verbalizes/displays adequate comfort level or baseline comfort level  Description: Interventions:  - Encourage patient to monitor pain and request assistance  - Assess pain using appropriate pain scale  - Administer analgesics based on type and severity of pain and evaluate response  - Implement non-pharmacological measures as appropriate and evaluate response  - Consider cultural and social influences on pain and pain management  - Notify physician/advanced practitioner if interventions unsuccessful or patient reports new pain  Outcome: Progressing     Problem: INFECTION - ADULT  Goal: Absence or prevention of progression during hospitalization  Description: INTERVENTIONS:  - Assess and monitor for signs and symptoms of infection  - Monitor lab/diagnostic results  - Monitor all insertion sites, i.e. indwelling lines, tubes, and drains  - Monitor endotracheal if appropriate and nasal secretions for changes in amount and color  - Campbell appropriate cooling/warming therapies per order  - Administer medications as ordered  - Instruct and encourage patient and family to use good hand hygiene technique  - Identify and  instruct in appropriate isolation precautions for identified infection/condition  Outcome: Progressing  Goal: Absence of fever/infection during neutropenic period  Description: INTERVENTIONS:  - Monitor WBC    Outcome: Progressing     Problem: SAFETY ADULT  Goal: Patient will remain free of falls  Description: INTERVENTIONS:  - Educate patient/family on patient safety including physical limitations  - Instruct patient to call for assistance with activity   - Consult OT/PT to assist with strengthening/mobility   - Keep Call bell within reach  - Keep bed low and locked with side rails adjusted as appropriate  - Keep care items and personal belongings within reach  - Initiate and maintain comfort rounds  - Make Fall Risk Sign visible to staff  - Initiate/Maintain bed alarm  - Obtain necessary fall risk management equipment: bed alarm, call bell,  socks  - Apply yellow socks and bracelet for high fall risk patients  - Consider moving patient to room near nurses station  Outcome: Progressing  Goal: Maintain or return to baseline ADL function  Description: INTERVENTIONS:  -  Assess patient's ability to carry out ADLs; assess patient's baseline for ADL function and identify physical deficits which impact ability to perform ADLs (bathing, care of mouth/teeth, toileting, grooming, dressing, etc.)  - Assess/evaluate cause of self-care deficits   - Assess range of motion  - Assess patient's mobility; develop plan if impaired  - Assess patient's need for assistive devices and provide as appropriate  - Encourage maximum independence but intervene and supervise when necessary  - Involve family in performance of ADLs  - Assess for home care needs following discharge   - Consider OT consult to assist with ADL evaluation and planning for discharge  - Provide patient education as appropriate  Outcome: Progressing  Goal: Maintains/Returns to pre admission functional level  Description: INTERVENTIONS:  - Perform AM-PAC 6 Click  Basic Mobility/ Daily Activity assessment daily.  - Set and communicate daily mobility goal to care team and patient/family/caregiver.   - Collaborate with rehabilitation services on mobility goals if consulted  - Perform Range of Motion 3 times a day.  - Reposition patient every 2 hours.  - Dangle patient 3 times a day  - Stand patient 3 times a day  - Ambulate patient 3 times a day  - Out of bed to chair 3 times a day   - Out of bed for meals 3 times a day  - Out of bed for toileting  - Record patient progress and toleration of activity level   Outcome: Progressing     Problem: DISCHARGE PLANNING  Goal: Discharge to home or other facility with appropriate resources  Description: INTERVENTIONS:  - Identify barriers to discharge w/patient and caregiver  - Arrange for needed discharge resources and transportation as appropriate  - Identify discharge learning needs (meds, wound care, etc.)  - Arrange for interpretive services to assist at discharge as needed  - Refer to Case Management Department for coordinating discharge planning if the patient needs post-hospital services based on physician/advanced practitioner order or complex needs related to functional status, cognitive ability, or social support system  Outcome: Progressing     Problem: Knowledge Deficit  Goal: Patient/family/caregiver demonstrates understanding of disease process, treatment plan, medications, and discharge instructions  Description: Complete learning assessment and assess knowledge base.  Interventions:  - Provide teaching at level of understanding  - Provide teaching via preferred learning methods  Outcome: Progressing     Problem: Prexisting or High Potential for Compromised Skin Integrity  Goal: Skin integrity is maintained or improved  Description: INTERVENTIONS:  - Identify patients at risk for skin breakdown  - Assess and monitor skin integrity  - Assess and monitor nutrition and hydration status  - Monitor labs   - Assess for  incontinence   - Turn and reposition patient  - Assist with mobility/ambulation  - Relieve pressure over bony prominences  - Avoid friction and shearing  - Provide appropriate hygiene as needed including keeping skin clean and dry  - Evaluate need for skin moisturizer/barrier cream  - Collaborate with interdisciplinary team   - Patient/family teaching  - Consider wound care consult   Outcome: Progressing     Problem: GASTROINTESTINAL - ADULT  Goal: Minimal or absence of nausea and/or vomiting  Description: INTERVENTIONS:  - Administer IV fluids if ordered to ensure adequate hydration  - Maintain NPO status until nausea and vomiting are resolved  - Nasogastric tube if ordered  - Administer ordered antiemetic medications as needed  - Provide nonpharmacologic comfort measures as appropriate  - Advance diet as tolerated, if ordered  - Consider nutrition services referral to assist patient with adequate nutrition and appropriate food choices  Outcome: Progressing

## 2024-09-21 NOTE — PROGRESS NOTES
Progress Note - Hospitalist   Name: Aaron Richards 59 y.o. male I MRN: [id number]  Unit/Bed#: E2 MS [facility]-01 I Date of Admission: 9/17/2024   Date of Service: 9/21/2024 I Hospital Day: 4    Assessment & Plan  Periprosthetic fracture around internal prosthetic right knee joint  Patient had repair  His pain is controlled.  He is working with PT    He wants to go to acute rehab in [place] if possible    Preoperative cardiovascular examination  Patient never had any heart issues.  No history of heart attack.  No history of congestive heart failure.    He states that last week he did do strenuous activity at physical therapy.  He did 10 minutes on an exercise bike with arm movements.    Due to his knee he cannot walk up a flight of stairs.    During this heavy activity at physical therapy he had no chest pain.  No dyspnea on exertion.  No shortness of breath.    This likely was greater than 4 METS without any cardiac issues    He is okay to go to surgery without any further cardiac workup.  Risk is due to anesthesia and the procedure itself.  Patient is accepting of these risks and wishes to proceed.  Risk would be less if spinal anesthesia was used, but it may not be possible depending on how complicated this procedure is.  Patient understands that as well and is accepting of all risks.  Type 2 diabetes mellitus with diabetic peripheral angiopathy without gangrene (HCC)  Lab Results   Component Value Date    HGBA1C 6.2 (H) 07/01/2024       Recent Labs     09/20/24  1607 09/20/24  2101 09/21/24  0651 09/21/24  1132   POCGLU 192* 239* 135 225*       Blood Sugar Average: Last 72 hrs:  (P) 219.8710881816457122    Blood sugar control is improving    Stage 3b chronic kidney disease (HCC)  Lab Results   Component Value Date    EGFR 104 09/21/2024    EGFR 95 09/20/2024    EGFR 94 09/17/2024    CREATININE 0.66 09/21/2024    CREATININE 0.82 09/20/2024    CREATININE 0.83 09/17/2024   Cr is stable    VTE Pharmacologic  Prophylaxis:                 Current Length of Stay: 4 day(s)  Current Patient Status: Inpatient   Certification Statement:     Discharge Plan:         Subjective    Doing well  Knee pain controlled.  No fever or chills  Eating well    Objective     Vitals:   Temp (24hrs), Av.3 °F (36.8 °C), Min:97.7 °F (36.5 °C), Max:99 °F (37.2 °C)    Temp:  [97.7 °F (36.5 °C)-99 °F (37.2 °C)] 97.7 °F (36.5 °C)  HR:  [69-98] 98  Resp:  [16-20] 16  BP: (107-154)/(70-89) 107/73  SpO2:  [99 %-100 %] 100 %  Body mass index is 29.25 kg/m².         Physical Exam  Vitals and nursing note reviewed.   Constitutional:       General: He is not in acute distress.     Appearance: He is well-developed.   HENT:      Head: Normocephalic and atraumatic.   Eyes:      Conjunctiva/sclera: Conjunctivae normal.   Cardiovascular:      Rate and Rhythm: Normal rate and regular rhythm.      Heart sounds: No murmur heard.  Pulmonary:      Effort: Pulmonary effort is normal. No respiratory distress.      Breath sounds: Normal breath sounds.   Abdominal:      Palpations: Abdomen is soft.      Tenderness: There is no abdominal tenderness.   Musculoskeletal:         General: No swelling.      Cervical back: Neck supple.      Comments: Right knee dressing C/D/I   Skin:     General: Skin is warm and dry.      Capillary Refill: Capillary refill takes less than 2 seconds.   Neurological:      Mental Status: He is alert.   Psychiatric:         Mood and Affect: Mood normal.           Lab results  Results from last 7 days   Lab Units 24  0545 24  0611 24  1354   WBC Thousand/uL 12.66* 18.49* 12.92*   HEMOGLOBIN g/dL 8.2* 9.6* 12.6   PLATELETS Thousands/uL 177 199 237   MCV fL 93 92 90   TOTAL NEUT ABS Thousand/uL 9.50*  --   --    INR   --   --  1.08     Results from last 7 days   Lab Units 24  0545 24  0611 24  1354   SODIUM mmol/L 136 133* 136   POTASSIUM mmol/L 4.3 4.6 4.3   CHLORIDE mmol/L 104 104 105   CO2 mmol/L 26 22 22    ANION GAP mmol/L 6 7 9   BUN mg/dL 19 24 28*   CREATININE mg/dL 0.66 0.82 0.83   CALCIUM mg/dL 8.5 8.5 9.3   ALBUMIN g/dL  --   --  3.8   TOTAL BILIRUBIN mg/dL  --   --  0.57   ALK PHOS U/L  --   --  138*   ALT U/L  --   --  18   AST U/L  --   --  23   EGFR ml/min/1.73sq m 104 95 94   GLUCOSE RANDOM mg/dL 154* 290* 107     Results from last 7 days   Lab Units 09/21/24  0545 09/20/24  0611   MAGNESIUM mg/dL 1.8* 1.6*             Last 24 Hours Medication List:     Current Facility-Administered Medications:     acetaminophen (TYLENOL) tablet 975 mg, Q8H    ascorbic acid (VITAMIN C) tablet 500 mg, BID    aspirin (ECOTRIN LOW STRENGTH) EC tablet 81 mg, BID    atorvastatin (LIPITOR) tablet 40 mg, Daily With Dinner    calcium carbonate (TUMS) chewable tablet 1,000 mg, Daily PRN    ceFAZolin (ANCEF) IVPB (premix in dextrose) 2,000 mg 50 mL, Q8H, Last Rate: Stopped (09/21/24 1231)    celecoxib (CeleBREX) capsule 100 mg, BID    Cholecalciferol (VITAMIN D3) tablet 2,000 Units, Daily    docusate sodium (COLACE) capsule 100 mg, BID PRN    docusate sodium (COLACE) capsule 100 mg, BID    escitalopram (LEXAPRO) tablet 5 mg, Daily    folic acid (FOLVITE) tablet 1 mg, Daily    gabapentin (NEURONTIN) capsule 300 mg, HS    HYDROcodone-acetaminophen (NORCO) 5-325 mg per tablet 1 tablet, Q4H PRN    HYDROmorphone (DILAUDID) injection 0.5 mg, Q2H PRN    insulin glargine (LANTUS) subcutaneous injection 25 Units 0.25 mL, Q12H TESS    insulin lispro (HumALOG/ADMELOG) 100 units/mL subcutaneous injection 1-6 Units, 4x Daily (AC & HS) **AND** [PENDING] insulin lispro (HumALOG/ADMELOG) 100 units/mL subcutaneous injection 1-6 Units, 4x Daily (AC & HS) **AND** Fingerstick Glucose (POCT), 4x Daily AC and at bedtime **AND** [PENDING] Fingerstick Glucose (POCT), 4x Daily AC and at bedtime    lactated ringers infusion, Continuous, Last Rate: Stopped (09/21/24 0711)    lactated ringers infusion, Continuous, Last Rate: Stopped (09/21/24 0711)     levothyroxine tablet 175 mcg, Early Morning    metoclopramide (REGLAN) injection 10 mg, Q6H PRN    multivitamin-minerals (CENTRUM) tablet 1 tablet, Daily    ondansetron (ZOFRAN) injection 4 mg, Q6H PRN    pantoprazole (PROTONIX) EC tablet 40 mg, Early Morning    senna (SENOKOT) tablet 8.6 mg, Daily    simethicone (MYLICON) chewable tablet 80 mg, 4x Daily PRN    traMADol (ULTRAM) tablet 50 mg, Q4H PRN

## 2024-09-21 NOTE — PROGRESS NOTES
Progress Note - Orthopedics   Name: Aaron Richards 59 y.o. male I MRN: [id number]  Unit/Bed#: E2 -01 I Date of Admission: 9/17/2024   Date of Service: 9/21/2024 I Hospital Day: 4     Assessment & Plan  Periprosthetic fracture around internal prosthetic right knee joint  POD 2 s/p Right Revision Total Knee Arthroplasty    WBAT RLE  Pain Control  PT/OT  Aspirin 81mg BID for DVT prophylaxis  Ancef 2000mg q8 hrs while admitted. Will transition to oral doxycycline once discharged.  Pt was experiencing significant emesis yesterday likely secondary to polypharmacy. This has since resolved. Patient wishes to avoid narcotics if possible. Celebrex ordered. He would like to take Celebrex and Tramadol primarily for pain.  D/C planning. Case management on board.  Preoperative cardiovascular examination    Type 2 diabetes mellitus with diabetic peripheral angiopathy without gangrene (HCC)  Lab Results   Component Value Date    HGBA1C 6.2 (H) 07/01/2024       Recent Labs     09/20/24  1109 09/20/24  1607 09/20/24  2101 09/21/24  0651   POCGLU 225* 192* 239* 135       Blood Sugar Average: Last 72 hrs:  (P) 219.5358220661199529    Stage 3b chronic kidney disease (HCC)  Lab Results   Component Value Date    EGFR 104 09/21/2024    EGFR 95 09/20/2024    EGFR 94 09/17/2024    CREATININE 0.66 09/21/2024    CREATININE 0.82 09/20/2024    CREATININE 0.83 09/17/2024         History of Present Illness   Pt seen this am. States right knee pain is well controlled. Pt reported multiple episodes of emesis yesterday evening but believes it was from too many different types of pain medications that were given to him. Currently he denies any nausea or vomiting. He wishes to avoid narcotics if possible and wishes to take an anti-inflammatory and Tramadol for pain. He denies any fever or chills. He does have a history of diabetic neuropathy but denies any change in his lower extremity numbness.    Objective      Temp:  [97.9 °F (36.6 °C)-99  °F (37.2 °C)] 98.3 °F (36.8 °C)  HR:  [69-99] 69  Resp:  [18-20] 18  BP: (116-154)/(68-89) 116/70  O2 Device: None (Room air)          I/O         09/19 0701 09/20 0700 09/20 0701 09/21 0700 09/21 0701 09/22 0700    P.O. 480 360     I.V. (mL/kg) 2400 (22)      IV Piggyback 400      Total Intake(mL/kg) 3280 (30.1) 360 (3.3)     Urine (mL/kg/hr) 1500 (0.6) 1050 (0.4) 325 (2.5)    Emesis/NG output  0     Blood 400      Total Output 1900 1050 325    Net +1380 -690 -325           Unmeasured Emesis Occurrence  2 x           Lines/Drains/Airways       Active Status       None                  Physical Exam Right Knee:  Dressing C/D/I. Scant blood staining distally. No saturation.  Knee ROM intact.  Lower extremity compartments are soft and compressible.  Motor intact distally.  Sensation intact but diminished distally due to diabetic neuropathy  Skin warm and well perfused. Palpable DP pulse.      Lab Results: I have reviewed the following results:   Recent Labs     09/20/24  0611 09/21/24  0545   WBC 18.49* 12.66*   HGB 9.6* 8.2*   HCT 29.1* 24.6*    177   BUN 24 19   CREATININE 0.82 0.66

## 2024-09-21 NOTE — QUICK NOTE
Was notified by nursing that patient's distal dressing became saturated after patient transferred from the toilet to the wheelchair. There was no injury or trauma.    Distal mepilex dressing was saturated with bloody drainage.   Dressings were removed. Incision was clean, dry, and intact, without signs of active bleeding. No palpable soft tissue collection or fluctuance.  New Mepilex dressings were applied.  Patient tolerated the dressing change well without complication.    Rico Fermin PA-C

## 2024-09-21 NOTE — ASSESSMENT & PLAN NOTE
POD 2 s/p Right Revision Total Knee Arthroplasty    WBAT RLE  Pain Control  PT/OT  Aspirin 81mg BID for DVT prophylaxis  Ancef 2000mg q8 hrs while admitted. Will transition to oral doxycycline once discharged.  Pt was experiencing significant emesis yesterday likely secondary to polypharmacy. This has since resolved. Patient wishes to avoid narcotics if possible. Celebrex ordered. He would like to take Celebrex and Tramadol primarily for pain.  D/C planning. Case management on board.

## 2024-09-22 LAB
ANION GAP SERPL CALCULATED.3IONS-SCNC: 6 MMOL/L (ref 4–13)
BACTERIA SPEC ANAEROBE CULT: NO GROWTH
BACTERIA TISS AEROBE CULT: NO GROWTH
BASOPHILS # BLD AUTO: 0.06 THOUSANDS/ΜL (ref 0–0.1)
BASOPHILS NFR BLD AUTO: 1 % (ref 0–1)
BUN SERPL-MCNC: 19 MG/DL (ref 5–25)
CALCIUM SERPL-MCNC: 8.4 MG/DL (ref 8.4–10.2)
CHLORIDE SERPL-SCNC: 106 MMOL/L (ref 96–108)
CO2 SERPL-SCNC: 25 MMOL/L (ref 21–32)
CREAT SERPL-MCNC: 0.65 MG/DL (ref 0.6–1.3)
EOSINOPHIL # BLD AUTO: 0.35 THOUSAND/ΜL (ref 0–0.61)
EOSINOPHIL NFR BLD AUTO: 4 % (ref 0–6)
ERYTHROCYTE [DISTWIDTH] IN BLOOD BY AUTOMATED COUNT: 12.7 % (ref 11.6–15.1)
GFR SERPL CREATININE-BSD FRML MDRD: 105 ML/MIN/1.73SQ M
GLUCOSE SERPL-MCNC: 142 MG/DL (ref 65–140)
GLUCOSE SERPL-MCNC: 169 MG/DL (ref 65–140)
GLUCOSE SERPL-MCNC: 177 MG/DL (ref 65–140)
GLUCOSE SERPL-MCNC: 196 MG/DL (ref 65–140)
GLUCOSE SERPL-MCNC: 200 MG/DL (ref 65–140)
GRAM STN SPEC: NORMAL
HCT VFR BLD AUTO: 24.2 % (ref 36.5–49.3)
HGB BLD-MCNC: 8 G/DL (ref 12–17)
IMM GRANULOCYTES # BLD AUTO: 0.06 THOUSAND/UL (ref 0–0.2)
IMM GRANULOCYTES NFR BLD AUTO: 1 % (ref 0–2)
LYMPHOCYTES # BLD AUTO: 2.72 THOUSANDS/ΜL (ref 0.6–4.47)
LYMPHOCYTES NFR BLD AUTO: 28 % (ref 14–44)
MAGNESIUM SERPL-MCNC: 1.7 MG/DL (ref 1.9–2.7)
MCH RBC QN AUTO: 30.2 PG (ref 26.8–34.3)
MCHC RBC AUTO-ENTMCNC: 33.1 G/DL (ref 31.4–37.4)
MCV RBC AUTO: 91 FL (ref 82–98)
MONOCYTES # BLD AUTO: 1.44 THOUSAND/ΜL (ref 0.17–1.22)
MONOCYTES NFR BLD AUTO: 15 % (ref 4–12)
NEUTROPHILS # BLD AUTO: 5.06 THOUSANDS/ΜL (ref 1.85–7.62)
NEUTS SEG NFR BLD AUTO: 51 % (ref 43–75)
NRBC BLD AUTO-RTO: 0 /100 WBCS
PLATELET # BLD AUTO: 193 THOUSANDS/UL (ref 149–390)
PMV BLD AUTO: 11.5 FL (ref 8.9–12.7)
POTASSIUM SERPL-SCNC: 3.9 MMOL/L (ref 3.5–5.3)
RBC # BLD AUTO: 2.65 MILLION/UL (ref 3.88–5.62)
SODIUM SERPL-SCNC: 137 MMOL/L (ref 135–147)
WBC # BLD AUTO: 9.69 THOUSAND/UL (ref 4.31–10.16)

## 2024-09-22 PROCEDURE — 99232 SBSQ HOSP IP/OBS MODERATE 35: CPT | Performed by: HOSPITALIST

## 2024-09-22 PROCEDURE — 80048 BASIC METABOLIC PNL TOTAL CA: CPT | Performed by: PHYSICIAN ASSISTANT

## 2024-09-22 PROCEDURE — 99024 POSTOP FOLLOW-UP VISIT: CPT | Performed by: STUDENT IN AN ORGANIZED HEALTH CARE EDUCATION/TRAINING PROGRAM

## 2024-09-22 PROCEDURE — 85025 COMPLETE CBC W/AUTO DIFF WBC: CPT | Performed by: HOSPITALIST

## 2024-09-22 PROCEDURE — 82948 REAGENT STRIP/BLOOD GLUCOSE: CPT

## 2024-09-22 PROCEDURE — 83735 ASSAY OF MAGNESIUM: CPT | Performed by: HOSPITALIST

## 2024-09-22 RX ADMIN — ACETAMINOPHEN 325MG 975 MG: 325 TABLET ORAL at 02:52

## 2024-09-22 RX ADMIN — CEFAZOLIN SODIUM 2000 MG: 2 SOLUTION INTRAVENOUS at 11:42

## 2024-09-22 RX ADMIN — Medication 1 TABLET: at 08:04

## 2024-09-22 RX ADMIN — ESCITALOPRAM OXALATE 5 MG: 10 TABLET ORAL at 08:03

## 2024-09-22 RX ADMIN — INSULIN LISPRO 4 UNITS: 100 INJECTION, SOLUTION INTRAVENOUS; SUBCUTANEOUS at 11:42

## 2024-09-22 RX ADMIN — PANTOPRAZOLE SODIUM 40 MG: 40 TABLET, DELAYED RELEASE ORAL at 06:00

## 2024-09-22 RX ADMIN — CELECOXIB 100 MG: 100 CAPSULE ORAL at 08:04

## 2024-09-22 RX ADMIN — LEVOTHYROXINE SODIUM 175 MCG: 125 TABLET ORAL at 06:00

## 2024-09-22 RX ADMIN — ATORVASTATIN CALCIUM 40 MG: 40 TABLET, FILM COATED ORAL at 16:44

## 2024-09-22 RX ADMIN — Medication 2000 UNITS: at 08:04

## 2024-09-22 RX ADMIN — INSULIN LISPRO 4 UNITS: 100 INJECTION, SOLUTION INTRAVENOUS; SUBCUTANEOUS at 16:44

## 2024-09-22 RX ADMIN — CELECOXIB 100 MG: 100 CAPSULE ORAL at 18:05

## 2024-09-22 RX ADMIN — ACETAMINOPHEN 325MG 975 MG: 325 TABLET ORAL at 10:12

## 2024-09-22 RX ADMIN — ACETAMINOPHEN 325MG 975 MG: 325 TABLET ORAL at 18:06

## 2024-09-22 RX ADMIN — SENNOSIDES 8.6 MG: 8.6 TABLET, FILM COATED ORAL at 22:11

## 2024-09-22 RX ADMIN — INSULIN GLARGINE 25 UNITS: 100 INJECTION, SOLUTION SUBCUTANEOUS at 21:44

## 2024-09-22 RX ADMIN — CEFAZOLIN SODIUM 2000 MG: 2 SOLUTION INTRAVENOUS at 02:52

## 2024-09-22 RX ADMIN — FOLIC ACID 1 MG: 1 TABLET ORAL at 08:04

## 2024-09-22 RX ADMIN — INSULIN LISPRO 1 UNITS: 100 INJECTION, SOLUTION INTRAVENOUS; SUBCUTANEOUS at 08:09

## 2024-09-22 RX ADMIN — DOCUSATE SODIUM 100 MG: 100 CAPSULE, LIQUID FILLED ORAL at 11:42

## 2024-09-22 RX ADMIN — OXYCODONE HYDROCHLORIDE AND ACETAMINOPHEN 500 MG: 500 TABLET ORAL at 18:05

## 2024-09-22 RX ADMIN — OXYCODONE HYDROCHLORIDE AND ACETAMINOPHEN 500 MG: 500 TABLET ORAL at 08:04

## 2024-09-22 RX ADMIN — CEFAZOLIN SODIUM 2000 MG: 2 SOLUTION INTRAVENOUS at 18:06

## 2024-09-22 RX ADMIN — DOCUSATE SODIUM 100 MG: 100 CAPSULE, LIQUID FILLED ORAL at 18:06

## 2024-09-22 RX ADMIN — ASPIRIN 81 MG: 81 TABLET, COATED ORAL at 18:05

## 2024-09-22 RX ADMIN — INSULIN GLARGINE 25 UNITS: 100 INJECTION, SOLUTION SUBCUTANEOUS at 08:04

## 2024-09-22 RX ADMIN — ASPIRIN 81 MG: 81 TABLET, COATED ORAL at 08:04

## 2024-09-22 NOTE — PROGRESS NOTES
Progress Note - Orthopedics   Name: Aaron Richards 59 y.o. male I MRN: [id number]  Unit/Bed#: E2 -01 I Date of Admission: 9/17/2024   Date of Service: 9/22/2024 I Hospital Day: 5     Assessment & Plan  Periprosthetic fracture around internal prosthetic right knee joint  POD 3 s/p Right Revision Total Knee Arthroplasty    WBAT RLE  Pain Control  PT/OT  Aspirin 81mg BID for DVT prophylaxis  Ancef 2000mg q8 hrs while admitted. Will transition to oral doxycycline once discharged.  Nausea has resolved and he has stopped narcotics  Dressing change today due to saturation distally  D/C planning. Case management on board.  Type 2 diabetes mellitus with diabetic peripheral angiopathy without gangrene (HCC)  Lab Results   Component Value Date    HGBA1C 6.2 (H) 07/01/2024       Recent Labs     09/21/24  2039 09/22/24  0734 09/22/24  1055 09/22/24  1558   POCGLU 258* 169* 200* 196*       Blood Sugar Average: Last 72 hrs:  (P) 204.4326263866321538    Stage 3b chronic kidney disease (HCC)  Lab Results   Component Value Date    EGFR 105 09/22/2024    EGFR 104 09/21/2024    EGFR 95 09/20/2024    CREATININE 0.65 09/22/2024    CREATININE 0.66 09/21/2024    CREATININE 0.82 09/20/2024           History of Present Illness   59 y.o.male Patient overall doing well.  He has been doing bed exercises.  He has not formally began walking on the right lower extremity.  He has stood.  He is looking forward to walking with physical therapy tomorrow     Gen: NAD  RLE:  Dressing changed 9/21,  Compartments soft and compressible,  Gross motor intact TA/GSC,  SILT SP/saph/sural/tibial,  Hind foot well perfused         Objective      Temp:  [97.9 °F (36.6 °C)-98.9 °F (37.2 °C)] 98.8 °F (37.1 °C)  HR:  [91-94] 94  Resp:  [16-18] 18  BP: (109-124)/(71-90) 109/85  O2 Device: None (Room air)          I/O         09/20 0701 09/21 0700 09/21 0701 09/22 0700 09/22 0701 09/23 0700    P.O. 360 480     I.V. (mL/kg)       IV Piggyback       Total  Intake(mL/kg) 360 (3.3) 480 (4.4)     Urine (mL/kg/hr) 1050 (0.4) 575 (0.2)     Emesis/NG output 0      Blood       Total Output 1050 575     Net -690 -95            Unmeasured Emesis Occurrence 2 x            Lines/Drains/Airways       Active Status       None                        Lab Results: I have reviewed the following results:   Recent Labs     09/20/24  0611 09/21/24  0545 09/22/24  0610   WBC 18.49* 12.66* 9.69   HGB 9.6* 8.2* 8.0*   HCT 29.1* 24.6* 24.2*    177 193   BUN 24 19 19   CREATININE 0.82 0.66 0.65     Blood Culture:    Lab Results   Component Value Date    BLOODCX No Growth After 5 Days. 10/10/2020    BLOODCX No Growth After 5 Days. 10/10/2020     Wound Culture:   Lab Results   Component Value Date    WOUNDCULT 2+ Growth of Morganella morganii (A) 08/30/2020    WOUNDCULT (A) 08/30/2020     2+ Growth of Methicillin Resistant Staphylococcus aureus    WOUNDCULT 1+ Growth of Enterobacter cloacae complex (A) 08/30/2020    WOUNDCULT 1+ Growth of Enterococcus faecalis (A) 08/30/2020

## 2024-09-22 NOTE — ASSESSMENT & PLAN NOTE
Lab Results   Component Value Date    EGFR 105 09/22/2024    EGFR 104 09/21/2024    EGFR 95 09/20/2024    CREATININE 0.65 09/22/2024    CREATININE 0.66 09/21/2024    CREATININE 0.82 09/20/2024       Cr is stable.

## 2024-09-22 NOTE — ASSESSMENT & PLAN NOTE
POD 3 s/p Right Revision Total Knee Arthroplasty    WBAT RLE  Pain Control  PT/OT  Aspirin 81mg BID for DVT prophylaxis  Ancef 2000mg q8 hrs while admitted. Will transition to oral doxycycline once discharged.  Nausea has resolved and he has stopped narcotics  Dressing change today due to saturation distally  D/C planning. Case management on board.

## 2024-09-22 NOTE — PLAN OF CARE
Problem: Potential for Falls  Goal: Patient will remain free of falls  Description: INTERVENTIONS:  - Educate patient/family on patient safety including physical limitations  - Instruct patient to call for assistance with activity   - Consult OT/PT to assist with strengthening/mobility   - Keep Call bell within reach  - Keep bed low and locked with side rails adjusted as appropriate  - Keep care items and personal belongings within reach  - Initiate and maintain comfort rounds  - Make Fall Risk Sign visible to staff  - Offer Toileting every 2 Hours, in advance of need  - Initiate/Maintain bed alarm  - Obtain necessary fall risk management equipment: yellow socks  - Apply yellow socks and bracelet for high fall risk patients  - Consider moving patient to room near nurses station  Outcome: Progressing     Problem: PAIN - ADULT  Goal: Verbalizes/displays adequate comfort level or baseline comfort level  Description: Interventions:  - Encourage patient to monitor pain and request assistance  - Assess pain using appropriate pain scale  - Administer analgesics based on type and severity of pain and evaluate response  - Implement non-pharmacological measures as appropriate and evaluate response  - Consider cultural and social influences on pain and pain management  - Notify physician/advanced practitioner if interventions unsuccessful or patient reports new pain  Outcome: Progressing     Problem: INFECTION - ADULT  Goal: Absence or prevention of progression during hospitalization  Description: INTERVENTIONS:  - Assess and monitor for signs and symptoms of infection  - Monitor lab/diagnostic results  - Monitor all insertion sites, i.e. indwelling lines, tubes, and drains  - Monitor endotracheal if appropriate and nasal secretions for changes in amount and color  - [place] appropriate cooling/warming therapies per order  - Administer medications as ordered  - Instruct and encourage patient and family to use good hand  hygiene technique  - Identify and instruct in appropriate isolation precautions for identified infection/condition  Outcome: Progressing  Goal: Absence of fever/infection during neutropenic period  Description: INTERVENTIONS:  - Monitor WBC    Outcome: Progressing     Problem: SAFETY ADULT  Goal: Patient will remain free of falls  Description: INTERVENTIONS:  - Educate patient/family on patient safety including physical limitations  - Instruct patient to call for assistance with activity   - Consult OT/PT to assist with strengthening/mobility   - Keep Call bell within reach  - Keep bed low and locked with side rails adjusted as appropriate  - Keep care items and personal belongings within reach  - Initiate and maintain comfort rounds  - Make Fall Risk Sign visible to staff  - Offer Toileting every 2 Hours, in advance of need  - Initiate/Maintain bed alarm  - Obtain necessary fall risk management equipment: yellow socks  - Apply yellow socks and bracelet for high fall risk patients  - Consider moving patient to room near nurses station  Outcome: Progressing  Goal: Maintain or return to baseline ADL function  Description: INTERVENTIONS:  -  Assess patient's ability to carry out ADLs; assess patient's baseline for ADL function and identify physical deficits which impact ability to perform ADLs (bathing, care of mouth/teeth, toileting, grooming, dressing, etc.)  - Assess/evaluate cause of self-care deficits   - Assess range of motion  - Assess patient's mobility; develop plan if impaired  - Assess patient's need for assistive devices and provide as appropriate  - Encourage maximum independence but intervene and supervise when necessary  - Involve family in performance of ADLs  - Assess for home care needs following discharge   - Consider OT consult to assist with ADL evaluation and planning for discharge  - Provide patient education as appropriate  Outcome: Progressing  Goal: Maintains/Returns to pre admission  functional level  Description: INTERVENTIONS:  - Perform AM-PAC 6 Click Basic Mobility/ Daily Activity assessment daily.  - Set and communicate daily mobility goal to care team and patient/family/caregiver.   - Collaborate with rehabilitation services on mobility goals if consulted  - Perform Range of Motion 3 times a day.  - Reposition patient every 2 hours.  - Dangle patient 3 times a day  - Stand patient 3 times a day  - Ambulate patient 3 times a day  - Out of bed to chair 3 times a day   - Out of bed for meals 3 times a day  - Out of bed for toileting  - Record patient progress and toleration of activity level   Outcome: Progressing     Problem: DISCHARGE PLANNING  Goal: Discharge to home or other facility with appropriate resources  Description: INTERVENTIONS:  - Identify barriers to discharge w/patient and caregiver  - Arrange for needed discharge resources and transportation as appropriate  - Identify discharge learning needs (meds, wound care, etc.)  - Arrange for interpretive services to assist at discharge as needed  - Refer to Case Management Department for coordinating discharge planning if the patient needs post-hospital services based on physician/advanced practitioner order or complex needs related to functional status, cognitive ability, or social support system  Outcome: Progressing     Problem: Knowledge Deficit  Goal: Patient/family/caregiver demonstrates understanding of disease process, treatment plan, medications, and discharge instructions  Description: Complete learning assessment and assess knowledge base.  Interventions:  - Provide teaching at level of understanding  - Provide teaching via preferred learning methods  Outcome: Progressing     Problem: Prexisting or High Potential for Compromised Skin Integrity  Goal: Skin integrity is maintained or improved  Description: INTERVENTIONS:  - Identify patients at risk for skin breakdown  - Assess and monitor skin integrity  - Assess and monitor  nutrition and hydration status  - Monitor labs   - Assess for incontinence   - Turn and reposition patient  - Assist with mobility/ambulation  - Relieve pressure over bony prominences  - Avoid friction and shearing  - Provide appropriate hygiene as needed including keeping skin clean and dry  - Evaluate need for skin moisturizer/barrier cream  - Collaborate with interdisciplinary team   - Patient/family teaching  - Consider wound care consult   Outcome: Progressing     Problem: GASTROINTESTINAL - ADULT  Goal: Minimal or absence of nausea and/or vomiting  Description: INTERVENTIONS:  - Administer IV fluids if ordered to ensure adequate hydration  - Maintain NPO status until nausea and vomiting are resolved  - Nasogastric tube if ordered  - Administer ordered antiemetic medications as needed  - Provide nonpharmacologic comfort measures as appropriate  - Advance diet as tolerated, if ordered  - Consider nutrition services referral to assist patient with adequate nutrition and appropriate food choices  Outcome: Progressing

## 2024-09-22 NOTE — ASSESSMENT & PLAN NOTE
Lab Results   Component Value Date    EGFR 105 09/22/2024    EGFR 104 09/21/2024    EGFR 95 09/20/2024    CREATININE 0.65 09/22/2024    CREATININE 0.66 09/21/2024    CREATININE 0.82 09/20/2024

## 2024-09-22 NOTE — ASSESSMENT & PLAN NOTE
Lab Results   Component Value Date    HGBA1C 6.2 (H) 07/01/2024       Recent Labs     09/21/24 2039 09/22/24  0734 09/22/24  1055 09/22/24  1558   POCGLU 258* 169* 200* 196*       Blood Sugar Average: Last 72 hrs:  (P) [id number]

## 2024-09-22 NOTE — PROGRESS NOTES
Progress Note - Hospitalist   Name: Aaron Richards 59 y.o. male I MRN: [id number]  Unit/Bed#: E2 -01 I Date of Admission: 9/17/2024   Date of Service: 9/22/2024 I Hospital Day: 5    Assessment & Plan  Periprosthetic fracture around internal prosthetic right knee joint  Patient had repair  His pain is controlled.  He is working with PT      The last 2 mornings when he stood up to go use the bathroom he would have some bloody drainage from his knee    He has a can reach out to orthopedics to come talk to him about it because he is concerned he could get infected    Ortho will see this afternoon  He wants to go to acute rehab in [place] if possible    Preoperative cardiovascular examination  Patient never had any heart issues.  No history of heart attack.  No history of congestive heart failure.    He states that last week he did do strenuous activity at physical therapy.  He did 10 minutes on an exercise bike with arm movements.    Due to his knee he cannot walk up a flight of stairs.    During this heavy activity at physical therapy he had no chest pain.  No dyspnea on exertion.  No shortness of breath.    This likely was greater than 4 METS without any cardiac issues    He is okay to go to surgery without any further cardiac workup.  Risk is due to anesthesia and the procedure itself.  Patient is accepting of these risks and wishes to proceed.  Risk would be less if spinal anesthesia was used, but it may not be possible depending on how complicated this procedure is.  Patient understands that as well and is accepting of all risks.  Type 2 diabetes mellitus with diabetic peripheral angiopathy without gangrene (HCC)  Lab Results   Component Value Date    HGBA1C 6.2 (H) 07/01/2024       Recent Labs     09/21/24  1630 09/21/24  2039 09/22/24  0734 09/22/24  1055   POCGLU 160* 258* 169* 200*       Blood Sugar Average: Last 72 hrs:  (P) [id number]    Blood sugars are well controlled  Stage 3b chronic  kidney disease (HCC)  Lab Results   Component Value Date    EGFR 105 2024    EGFR 104 2024    EGFR 95 2024    CREATININE 0.65 2024    CREATININE 0.66 2024    CREATININE 0.82 2024       Cr is stable.    VTE Pharmacologic Prophylaxis:                 Current Length of Stay: 5 day(s)  Current Patient Status: Inpatient   Certification Statement:     Discharge Plan:         Subjective    His knee pain is better  When he stood up to walk to the bathroom this am he had bloody drainage from the knee.    Knee was rebandaged by nursing.    Objective     Vitals:   Temp (24hrs), Av.2 °F (36.8 °C), Min:97.7 °F (36.5 °C), Max:98.9 °F (37.2 °C)    Temp:  [97.7 °F (36.5 °C)-98.9 °F (37.2 °C)] 98.9 °F (37.2 °C)  HR:  [91-98] 91  Resp:  [16-18] 18  BP: (107-124)/(71-90) 124/90  SpO2:  [100 %] 100 %  Body mass index is 29.25 kg/m².         Physical Exam  Vitals and nursing note reviewed.   Constitutional:       General: He is not in acute distress.     Appearance: He is well-developed.   HENT:      Head: Normocephalic and atraumatic.   Eyes:      Conjunctiva/sclera: Conjunctivae normal.   Cardiovascular:      Rate and Rhythm: Normal rate and regular rhythm.      Heart sounds: No murmur heard.  Pulmonary:      Effort: Pulmonary effort is normal. No respiratory distress.      Breath sounds: Normal breath sounds.   Abdominal:      Palpations: Abdomen is soft.      Tenderness: There is no abdominal tenderness.   Musculoskeletal:         General: No swelling.      Cervical back: Neck supple.      Right lower leg: No edema.      Left lower leg: No edema.   Skin:     General: Skin is warm and dry.      Capillary Refill: Capillary refill takes less than 2 seconds.   Neurological:      Mental Status: He is alert.   Psychiatric:         Mood and Affect: Mood normal.           Lab results  Results from last 7 days   Lab Units 24  0610 24  0545 24  0611 24  1354   WBC Thousand/uL  9.69 12.66* 18.49* 12.92*   HEMOGLOBIN g/dL 8.0* 8.2* 9.6* 12.6   PLATELETS Thousands/uL 193 177 199 237   MCV fL 91 93 92 90   TOTAL NEUT ABS Thousand/uL  --  9.50*  --   --    INR   --   --   --  1.08     Results from last 7 days   Lab Units 09/22/24  0610 09/21/24  0545 09/20/24  0611 09/17/24  1354   SODIUM mmol/L 137 136 133* 136   POTASSIUM mmol/L 3.9 4.3 4.6 4.3   CHLORIDE mmol/L 106 104 104 105   CO2 mmol/L 25 26 22 22   ANION GAP mmol/L 6 6 7 9   BUN mg/dL 19 19 24 28*   CREATININE mg/dL 0.65 0.66 0.82 0.83   CALCIUM mg/dL 8.4 8.5 8.5 9.3   ALBUMIN g/dL  --   --   --  3.8   TOTAL BILIRUBIN mg/dL  --   --   --  0.57   ALK PHOS U/L  --   --   --  138*   ALT U/L  --   --   --  18   AST U/L  --   --   --  23   EGFR ml/min/1.73sq m 105 104 95 94   GLUCOSE RANDOM mg/dL 177* 154* 290* 107     Results from last 7 days   Lab Units 09/22/24  0610 09/21/24  0545 09/20/24  0611   MAGNESIUM mg/dL 1.7* 1.8* 1.6*             Last 24 Hours Medication List:     Current Facility-Administered Medications:     acetaminophen (TYLENOL) tablet 975 mg, Q8H    ascorbic acid (VITAMIN C) tablet 500 mg, BID    aspirin (ECOTRIN LOW STRENGTH) EC tablet 81 mg, BID    atorvastatin (LIPITOR) tablet 40 mg, Daily With Dinner    calcium carbonate (TUMS) chewable tablet 1,000 mg, Daily PRN    ceFAZolin (ANCEF) IVPB (premix in dextrose) 2,000 mg 50 mL, Q8H, Last Rate: 2,000 mg (09/22/24 1142)    celecoxib (CeleBREX) capsule 100 mg, BID    Cholecalciferol (VITAMIN D3) tablet 2,000 Units, Daily    docusate sodium (COLACE) capsule 100 mg, BID PRN    docusate sodium (COLACE) capsule 100 mg, BID    escitalopram (LEXAPRO) tablet 5 mg, Daily    folic acid (FOLVITE) tablet 1 mg, Daily    gabapentin (NEURONTIN) capsule 300 mg, HS    HYDROcodone-acetaminophen (NORCO) 5-325 mg per tablet 1 tablet, Q4H PRN    insulin glargine (LANTUS) subcutaneous injection 25 Units 0.25 mL, Q12H TESS    insulin lispro (HumALOG/ADMELOG) 100 units/mL subcutaneous injection  1-6 Units, 4x Daily (AC & HS) **AND** [PENDING] insulin lispro (HumALOG/ADMELOG) 100 units/mL subcutaneous injection 1-6 Units, 4x Daily (AC & HS) **AND** Fingerstick Glucose (POCT), 4x Daily AC and at bedtime **AND** [PENDING] Fingerstick Glucose (POCT), 4x Daily AC and at bedtime    lactated ringers infusion, Continuous, Last Rate: Stopped (09/21/24 0711)    lactated ringers infusion, Continuous, Last Rate: Stopped (09/21/24 0711)    levothyroxine tablet 175 mcg, Early Morning    metoclopramide (REGLAN) injection 10 mg, Q6H PRN    multivitamin-minerals (CENTRUM) tablet 1 tablet, Daily    ondansetron (ZOFRAN) injection 4 mg, Q6H PRN    pantoprazole (PROTONIX) EC tablet 40 mg, Early Morning    senna (SENOKOT) tablet 8.6 mg, Daily    simethicone (MYLICON) chewable tablet 80 mg, 4x Daily PRN    traMADol (ULTRAM) tablet 50 mg, Q4H PRN

## 2024-09-22 NOTE — ASSESSMENT & PLAN NOTE
Lab Results   Component Value Date    HGBA1C 6.2 (H) 07/01/2024       Recent Labs     09/21/24  1630 09/21/24 2039 09/22/24  0734 09/22/24  1055   POCGLU 160* 258* 169* 200*       Blood Sugar Average: Last 72 hrs:  (P) [id number]    Blood sugars are well controlled

## 2024-09-22 NOTE — ASSESSMENT & PLAN NOTE
Patient had repair  His pain is controlled.  He is working with PT      The last 2 mornings when he stood up to go use the bathroom he would have some bloody drainage from his knee    He has a can reach out to orthopedics to come talk to him about it because he is concerned he could get infected    Ortho will see this afternoon  He wants to go to acute rehab in [place] if possible

## 2024-09-23 ENCOUNTER — TRANSITIONAL CARE MANAGEMENT (OUTPATIENT)
Dept: FAMILY MEDICINE CLINIC | Facility: CLINIC | Age: 59
End: 2024-09-23

## 2024-09-23 VITALS
RESPIRATION RATE: 18 BRPM | TEMPERATURE: 97.9 F | HEART RATE: 77 BPM | DIASTOLIC BLOOD PRESSURE: 81 MMHG | OXYGEN SATURATION: 100 % | BODY MASS INDEX: 29.26 KG/M2 | HEIGHT: 76 IN | WEIGHT: 240.3 LBS | SYSTOLIC BLOOD PRESSURE: 124 MMHG

## 2024-09-23 LAB
FUNGUS SPEC CULT: NORMAL
GLUCOSE SERPL-MCNC: 101 MG/DL (ref 65–140)
GLUCOSE SERPL-MCNC: 159 MG/DL (ref 65–140)

## 2024-09-23 PROCEDURE — NC001 PR NO CHARGE: Performed by: STUDENT IN AN ORGANIZED HEALTH CARE EDUCATION/TRAINING PROGRAM

## 2024-09-23 PROCEDURE — 99024 POSTOP FOLLOW-UP VISIT: CPT | Performed by: STUDENT IN AN ORGANIZED HEALTH CARE EDUCATION/TRAINING PROGRAM

## 2024-09-23 PROCEDURE — 82948 REAGENT STRIP/BLOOD GLUCOSE: CPT

## 2024-09-23 RX ADMIN — FOLIC ACID 1 MG: 1 TABLET ORAL at 08:46

## 2024-09-23 RX ADMIN — LEVOTHYROXINE SODIUM 175 MCG: 125 TABLET ORAL at 05:21

## 2024-09-23 RX ADMIN — OXYCODONE HYDROCHLORIDE AND ACETAMINOPHEN 500 MG: 500 TABLET ORAL at 08:46

## 2024-09-23 RX ADMIN — PANTOPRAZOLE SODIUM 40 MG: 40 TABLET, DELAYED RELEASE ORAL at 05:21

## 2024-09-23 RX ADMIN — ACETAMINOPHEN 325MG 975 MG: 325 TABLET ORAL at 11:09

## 2024-09-23 RX ADMIN — DOCUSATE SODIUM 100 MG: 100 CAPSULE, LIQUID FILLED ORAL at 08:45

## 2024-09-23 RX ADMIN — ACETAMINOPHEN 325MG 975 MG: 325 TABLET ORAL at 03:00

## 2024-09-23 RX ADMIN — Medication 1 TABLET: at 08:46

## 2024-09-23 RX ADMIN — CELECOXIB 100 MG: 100 CAPSULE ORAL at 08:46

## 2024-09-23 RX ADMIN — Medication 2000 UNITS: at 08:46

## 2024-09-23 RX ADMIN — INSULIN GLARGINE 25 UNITS: 100 INJECTION, SOLUTION SUBCUTANEOUS at 08:47

## 2024-09-23 RX ADMIN — INSULIN LISPRO 1 UNITS: 100 INJECTION, SOLUTION INTRAVENOUS; SUBCUTANEOUS at 11:52

## 2024-09-23 RX ADMIN — ESCITALOPRAM OXALATE 5 MG: 10 TABLET ORAL at 08:46

## 2024-09-23 RX ADMIN — CEFAZOLIN SODIUM 2000 MG: 2 SOLUTION INTRAVENOUS at 03:10

## 2024-09-23 RX ADMIN — ASPIRIN 81 MG: 81 TABLET, COATED ORAL at 08:46

## 2024-09-23 NOTE — RESTORATIVE TECHNICIAN NOTE
Restorative Technician Note      Patient Name: Aaron Richards     Restorative Tech Visit Date: 09/23/24  Note Type: Mobility  Patient Position Upon Consult: Supine  Activity Performed: Range of motion; Repositioned  Patient Position at End of Consult: All needs within reach; Supine

## 2024-09-23 NOTE — NURSING NOTE
Pt discharged to home. AVS review with patient, he verbalized understanding. IV taken out and belongings accounted for. Pt leaving via wheelchair with RN and spouse.

## 2024-09-23 NOTE — ARC ADMISSION
Received referral on patient for possible ARC placement. Upon review of patient's case with ARC physician, patient deemed not ARC appropriate at this time. Lower level of care/therapies recommended. CM made aware.    Thank you,   Magaly Ramirez  [facility] Admissions

## 2024-09-23 NOTE — DISCHARGE SUMMARY
Discharge Summary - Orthopedics   Name: Aaron Richards 59 y.o. 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 Hendrix on 7/15/24. Patient had a fall recently and had deformity of the knee at that time and returned himself to his wheelchair.  He was evaluated by Dr. Hendrix in the clinic on 9/17/24.  X-rays demonstrated fracture of his proximal tibia with posterior dislocation and loosening of his tibial component.  He was sent directly to the hospital for admission and imaging. A CAT scan was obtained which showed fracture of the small amount of remaining posterior medial tibial plateau and posterior dislocation of his tibial component. The patient finished his course of IV antibiotics and had been on doxycycline at the direction of infectious disease for prolonged antibiotic course.  He had a culture-negative infection previously.  Due to his catastrophic failure he was indicated for conversion to hinged total knee arthroplasty. Dr. Hendrix discussed that he had massive proximal tibial bone loss and the plan would be for likely built-up baseplate with sleeve or cone fixation within the tibia.  He discussed that possibility of the need for proximal tibial replacement.  They discussed risks which include but are not limited to fracture, infection, damage to surrounding structures, damage to popliteal stent, loosening,  instability, continued pain, need for further surgery, blood clots, stroke, heart attack, loss of limb or life.     Procedures Performed: Right - ARTHROPLASTY KNEE TOTAL REVISION     Summary of Hospital Course: Hospital Course: The patient was admitted to the hospital on 9/17/2024 and on 9/19/24 underwent an uncomplicated right total knee arthroplasty revision.  They were transferred to the floor after a brief stay in the post-anesthesia care unit. Their pain was well managed with IV and oral pain medications.  They began therapy on post operative day #1.  Aspirin was also started for DVT prophylaxis post-operatively.  Hemovac drain was removed on POD1.  Patient stayed in-house post-operatively to work with PT/OT.  On discharge date pt was cleared by PT and the medicine team and determined to be safe for discharge.  Daily discussion was had with the patient, nursing staff, orthopaedic team, and family members if present.  All questions were answered to the patients satisifaction.    Significant Findings, Care, Treatment and Services Provided: See Summary os Hospital Course    Complications: None    Condition at Discharge: good       Discharge instructions/Information to patient and family:   See After Visit Summary (AVS) for information provided to patient and family.      Provisions for Follow-Up Care:  See after visit summary for information related to follow-up care and any pertinent home health orders.      PCP: Oswaldo Prather MD    Disposition: Home    Planned Readmission: No     Discharge Medications:  See after visit summary for reconciled discharge medications provided to patient and family.      Discharge Statement:  I have spent a total time of 45 minutes in caring for this patient on the day of the visit/encounter. >30 minutes of time was spent on: Patient and family education, Counseling / Coordination of care, Documenting in the medical record, and Communicating with other healthcare professionals .

## 2024-09-23 NOTE — ASSESSMENT & PLAN NOTE
Lab Results   Component Value Date    HGBA1C 6.2 (H) 07/01/2024       Recent Labs     09/22/24  1055 09/22/24  1558 09/22/24  2158 09/23/24  0643   POCGLU 200* 196* 142* 101       Blood Sugar Average: Last 72 hrs:  (P) [id number]

## 2024-09-23 NOTE — CASE MANAGEMENT
Case Management Discharge Planning Note    Patient name Aaron Richards  Location [facility] /E2 -* MRN [id number]  : 1965 Date 2024       Current Admission Date: 2024  Current Admission Diagnosis:Periprosthetic fracture around internal prosthetic right knee joint   Patient Active Problem List    Diagnosis Date Noted Date Diagnosed    Periprosthetic fracture around internal prosthetic right knee joint 2024     Poor dentition 2024     Pain 2024     At risk for venous thromboembolism (VTE) 2024     Diabetic neuropathy (HCC) 2024     Anemia 2024     Leukocytosis 2024     Chronic pain of right knee 07/10/2024     Type 2 diabetes mellitus without complication, with long-term current use of insulin ([facility]) 07/10/2024     Septic arthritis of knee ([facility]) 2024     S/P hardware removal 2023     MANNY (obstructive sleep apnea) 2023     Constipation 2023     Depression and anxiety 2023     Acquired genu varum of right lower extremity 2023     MARKELL (acute kidney injury) ([facility]) 2023     Chronic kidney disease-mineral and bone disorder 2023     Closed fracture of right tibial plateau 2023     Stage 3b chronic kidney disease ([facility]) 11/15/2022     Hyperkalemia 11/15/2022     Alcoholism ([facility]) 11/15/2022     Dupuytren's contracture of right hand 2022     Type 2 diabetes mellitus with diabetic peripheral angiopathy without gangrene ([facility]) 2022     Acquired hypothyroidism 10/18/2021     Acquired absence of right foot ([facility]) 2021     Persistent proteinuria 2021     Abnormal EKG 2020     Insomnia 2020     Orthostasis 10/08/2020     Urinary retention 2020     Thyroid cancer ([facility]) 2020     PAD (peripheral artery disease) ([facility]) 2020     Preoperative cardiovascular examination 2020     Elevated liver enzymes 10/24/2019     Colon polyps 2019     Right-sided  tinnitus 11/26/2018     Hypertension, essential 09/24/2018     Gastroesophageal reflux disease without esophagitis 09/24/2018     Hyperlipidemia 12/17/2009     History of nonadherence to medical treatment 02/20/2008       LOS (days): 6  Geometric Mean LOS (GMLOS) (days): 3.2  Days to GMLOS:-2.7     OBJECTIVE:  Risk of Unplanned Readmission Score: 21.42         Current admission status: Inpatient   Preferred Pharmacy:   [id number] [place] [facility] [address] [facility] [address]  Phone: [phone] Fax: [phone]    [facility] - [place], [place] - [address]  [address] [facility] [address]  Phone: [phone] Fax: [phone]    Primary Care Provider: Oswaldo Prather MD    Primary Insurance: MEDICARE  Secondary Insurance: BLUE CROSS    DISCHARGE DETAILS:    Discharge planning discussed with:: Patient  Freedom of Choice: Yes  Comments - Freedom of Choice: Pt denied for [facility]. Not interested in sub-acute or HHC. Reports he will do OP therapy  CM contacted family/caregiver?: No- see comments (pt already called wife to come pick him up)  Were Treatment Team discharge recommendations reviewed with patient/caregiver?: Yes  Did patient/caregiver verbalize understanding of patient care needs?: Yes  Were patient/caregiver advised of the risks associated with not following Treatment Team discharge recommendations?: Yes         Requested Home Health Care         Is the patient interested in HHC at discharge?: No    DME Referral Provided  Referral made for DME?: No    Other Referral/Resources/Interventions Provided:  Interventions: None Indicated         Treatment Team Recommendation: Short Term Rehab, Home with Home Health Care  Discharge Destination Plan:: Home (OPPT)  Transport at Discharge : Family                       Accompanied by: Family member     IMM Given (Date):: 09/23/24  IMM Given to:: Patient  IMM was reviewed with the  pt. Pt reports understanding of the ability to appeal discharge if feeling as though not medically stable for discharge. IMM was signed and placed in the scan bin.

## 2024-09-23 NOTE — PLAN OF CARE
Problem: Potential for Falls  Goal: Patient will remain free of falls  Description: INTERVENTIONS:  - Educate patient/family on patient safety including physical limitations  - Instruct patient to call for assistance with activity   - Consult OT/PT to assist with strengthening/mobility   - Keep Call bell within reach  - Keep bed low and locked with side rails adjusted as appropriate  - Keep care items and personal belongings within reach  - Initiate and maintain comfort rounds  - Make Fall Risk Sign visible to staff  - Offer Toileting every 2 Hours, in advance of need  - Initiate/Maintain bed alarm  - Obtain necessary fall risk management equipment:  socks, yellow bracelet, call bell  - Apply yellow socks and bracelet for high fall risk patients  - Consider moving patient to room near nurses station  Outcome: Progressing     Problem: PAIN - ADULT  Goal: Verbalizes/displays adequate comfort level or baseline comfort level  Description: Interventions:  - Encourage patient to monitor pain and request assistance  - Assess pain using appropriate pain scale  - Administer analgesics based on type and severity of pain and evaluate response  - Implement non-pharmacological measures as appropriate and evaluate response  - Consider cultural and social influences on pain and pain management  - Notify physician/advanced practitioner if interventions unsuccessful or patient reports new pain  Outcome: Progressing     Problem: INFECTION - ADULT  Goal: Absence or prevention of progression during hospitalization  Description: INTERVENTIONS:  - Assess and monitor for signs and symptoms of infection  - Monitor lab/diagnostic results  - Monitor all insertion sites, i.e. indwelling lines, tubes, and drains  - Monitor endotracheal if appropriate and nasal secretions for changes in amount and color  - [place] appropriate cooling/warming therapies per order  - Administer medications as ordered  - Instruct and encourage patient and  family to use good hand hygiene technique  - Identify and instruct in appropriate isolation precautions for identified infection/condition  Outcome: Progressing  Goal: Absence of fever/infection during neutropenic period  Description: INTERVENTIONS:  - Monitor WBC    Outcome: Progressing     Problem: SAFETY ADULT  Goal: Patient will remain free of falls  Description: INTERVENTIONS:  - Educate patient/family on patient safety including physical limitations  - Instruct patient to call for assistance with activity   - Consult OT/PT to assist with strengthening/mobility   - Keep Call bell within reach  - Keep bed low and locked with side rails adjusted as appropriate  - Keep care items and personal belongings within reach  - Initiate and maintain comfort rounds  - Make Fall Risk Sign visible to staff  - Offer Toileting every 2 Hours, in advance of need  - Initiate/Maintain bed alarm  - Obtain necessary fall risk management equipment:  socks, yellow socks, call bell  - Apply yellow socks and bracelet for high fall risk patients  - Consider moving patient to room near nurses station  Outcome: Progressing  Goal: Maintain or return to baseline ADL function  Description: INTERVENTIONS:  -  Assess patient's ability to carry out ADLs; assess patient's baseline for ADL function and identify physical deficits which impact ability to perform ADLs (bathing, care of mouth/teeth, toileting, grooming, dressing, etc.)  - Assess/evaluate cause of self-care deficits   - Assess range of motion  - Assess patient's mobility; develop plan if impaired  - Assess patient's need for assistive devices and provide as appropriate  - Encourage maximum independence but intervene and supervise when necessary  - Involve family in performance of ADLs  - Assess for home care needs following discharge   - Consider OT consult to assist with ADL evaluation and planning for discharge  - Provide patient education as appropriate  Outcome:  Progressing  Goal: Maintains/Returns to pre admission functional level  Description: INTERVENTIONS:  - Perform AM-PAC 6 Click Basic Mobility/ Daily Activity assessment daily.  - Set and communicate daily mobility goal to care team and patient/family/caregiver.   - Collaborate with rehabilitation services on mobility goals if consulted  - Perform Range of Motion 4 times a day.  - Reposition patient every 2 hours.  - Dangle patient 3 times a day  - Stand patient 3 times a day  - Ambulate patient 3 times a day  - Out of bed to chair 3 times a day   - Out of bed for meals 3 times a day  - Out of bed for toileting  - Record patient progress and toleration of activity level   Outcome: Progressing     Problem: DISCHARGE PLANNING  Goal: Discharge to home or other facility with appropriate resources  Description: INTERVENTIONS:  - Identify barriers to discharge w/patient and caregiver  - Arrange for needed discharge resources and transportation as appropriate  - Identify discharge learning needs (meds, wound care, etc.)  - Arrange for interpretive services to assist at discharge as needed  - Refer to Case Management Department for coordinating discharge planning if the patient needs post-hospital services based on physician/advanced practitioner order or complex needs related to functional status, cognitive ability, or social support system  Outcome: Progressing     Problem: Knowledge Deficit  Goal: Patient/family/caregiver demonstrates understanding of disease process, treatment plan, medications, and discharge instructions  Description: Complete learning assessment and assess knowledge base.  Interventions:  - Provide teaching at level of understanding  - Provide teaching via preferred learning methods  Outcome: Progressing     Problem: Prexisting or High Potential for Compromised Skin Integrity  Goal: Skin integrity is maintained or improved  Description: INTERVENTIONS:  - Identify patients at risk for skin breakdown  -  Assess and monitor skin integrity  - Assess and monitor nutrition and hydration status  - Monitor labs   - Assess for incontinence   - Turn and reposition patient  - Assist with mobility/ambulation  - Relieve pressure over bony prominences  - Avoid friction and shearing  - Provide appropriate hygiene as needed including keeping skin clean and dry  - Evaluate need for skin moisturizer/barrier cream  - Collaborate with interdisciplinary team   - Patient/family teaching  - Consider wound care consult   Outcome: Progressing     Problem: GASTROINTESTINAL - ADULT  Goal: Minimal or absence of nausea and/or vomiting  Description: INTERVENTIONS:  - Administer IV fluids if ordered to ensure adequate hydration  - Maintain NPO status until nausea and vomiting are resolved  - Nasogastric tube if ordered  - Administer ordered antiemetic medications as needed  - Provide nonpharmacologic comfort measures as appropriate  - Advance diet as tolerated, if ordered  - Consider nutrition services referral to assist patient with adequate nutrition and appropriate food choices  Outcome: Progressing

## 2024-09-23 NOTE — PROGRESS NOTES
Progress Note - Orthopedics   Name: Aaron Richards 59 y.o. male I MRN: [id number]  Unit/Bed#: E2 -01 I Date of Admission: 9/17/2024   Date of Service: 9/23/2024 I Hospital Day: 6    Assessment & Plan  Periprosthetic fracture around internal prosthetic right knee joint  POD 4 s/p Right Revision Total Knee Arthroplasty    WBAT RLE  Pain Control  PT/OT  Aspirin 81mg BID for DVT prophylaxis  Ancef 2000mg q8 hrs while admitted. Will transition to oral doxycycline once discharged.  Nausea has resolved and he has stopped narcotics  Dressing change PRN for saturation distally  D/C planning. Case management on board.  Type 2 diabetes mellitus with diabetic peripheral angiopathy without gangrene (HCC)  Lab Results   Component Value Date    HGBA1C 6.2 (H) 07/01/2024       Recent Labs     09/22/24  1055 09/22/24  1558 09/22/24  2158 09/23/24  0643   POCGLU 200* 196* 142* 101       Blood Sugar Average: Last 72 hrs:  (P) [id number]    Stage 3b chronic kidney disease (HCC)  Lab Results   Component Value Date    EGFR 105 09/22/2024    EGFR 104 09/21/2024    EGFR 95 09/20/2024    CREATININE 0.65 09/22/2024    CREATININE 0.66 09/21/2024    CREATININE 0.82 09/20/2024     Preoperative cardiovascular examination      Ok for discharge from Orthopedics service perspective when rehab placement is located.  Patient states that if he does not medically qualify to go to [place], he would just prefer to go home and do outpatient therapy.    History of Present Illness   59 y.o.male POD 4 s/p Right Revision Total Knee Arthroplasty.  No new acute overnight events, no new complaints.  Pain well controlled.  The patient reports some dizziness when standing, but he states this has been ongoing for some time even when he was at his rehab center.  He denies current subjective fevers, chills, headaches, CP, SOB, N/V, or numbness or tingling.  Patient states that if he does not medically qualify to go to [place], he would just  prefer to go home and do outpatient therapy.    Objective      Temp:  [97.9 °F (36.6 °C)-98.8 °F (37.1 °C)] 97.9 °F (36.6 °C)  HR:  [77-94] 77  Resp:  [18] 18  BP: (109-124)/(70-85) 124/81  O2 Device: None (Room air)          I/O         09/21 0701 09/22 0700 09/22 0701 09/23 0700 09/23 0701  09/24 0700    P.O. 480      Total Intake(mL/kg) 480 (4.4)      Urine (mL/kg/hr) 575 (0.2) 675 (0.3) 450 (1.9)    Emesis/NG output       Total Output 575 675 450    Net -95 -675 -450                 Lines/Drains/Airways       Active Status       None                  Physical Exam  Vitals and nursing note reviewed.   Constitutional:       General: He is not in acute distress.     Appearance: He is well-developed.   HENT:      Head: Normocephalic and atraumatic.   Eyes:      Conjunctiva/sclera: Conjunctivae normal.   Cardiovascular:      Rate and Rhythm: Normal rate.   Pulmonary:      Effort: Pulmonary effort is normal. No respiratory distress.      Breath sounds: Normal breath sounds.   Abdominal:      Palpations: Abdomen is soft.      Tenderness: There is no abdominal tenderness.   Musculoskeletal:      Cervical back: Neck supple.   Skin:     General: Skin is warm and dry.      Capillary Refill: Capillary refill takes less than 2 seconds.   Neurological:      Mental Status: He is alert.   Psychiatric:         Mood and Affect: Mood normal.     Musculoskeletal: right lower  Visible skin is without erythema or ecchymosis.  Dressing C/D/I  No TTP over the knee  Patient has prior history of toe amputation  Motor intact to +ankle dorsi/plantar flexion  Sensation intact in the foot  Extremity WWP      Lab Results: I have reviewed the following results: CBC/BMP: No new results in last 24 hours.   Recent Labs     09/21/24  0545 09/22/24  0610   WBC 12.66* 9.69   HGB 8.2* 8.0*   HCT 24.6* 24.2*    193   BUN 19 19   CREATININE 0.66 0.65     Blood Culture:    Lab Results   Component Value Date    BLOODCX No Growth After 5 Days.  10/10/2020    BLOODCX No Growth After 5 Days. 10/10/2020     Wound Culture:   Lab Results   Component Value Date    WOUNDCULT 2+ Growth of Morganella morganii (A) 08/30/2020    WOUNDCULT (A) 08/30/2020     2+ Growth of Methicillin Resistant Staphylococcus aureus    WOUNDCULT 1+ Growth of Enterobacter cloacae complex (A) 08/30/2020    WOUNDCULT 1+ Growth of Enterococcus faecalis (A) 08/30/2020

## 2024-09-24 ENCOUNTER — TELEMEDICINE (OUTPATIENT)
Dept: INFECTIOUS DISEASES | Facility: CLINIC | Age: 59
End: 2024-09-24
Payer: MEDICARE

## 2024-09-24 ENCOUNTER — TELEPHONE (OUTPATIENT)
Age: 59
End: 2024-09-24

## 2024-09-24 DIAGNOSIS — M00.9 PYOGENIC ARTHRITIS OF RIGHT KNEE JOINT, DUE TO UNSPECIFIED ORGANISM (HCC): Primary | ICD-10-CM

## 2024-09-24 DIAGNOSIS — Z96.651 AFTERCARE FOLLOWING RIGHT KNEE JOINT REPLACEMENT SURGERY: Primary | ICD-10-CM

## 2024-09-24 DIAGNOSIS — E11.51 TYPE 2 DIABETES MELLITUS WITH DIABETIC PERIPHERAL ANGIOPATHY WITHOUT GANGRENE, WITH LONG-TERM CURRENT USE OF INSULIN (HCC): ICD-10-CM

## 2024-09-24 DIAGNOSIS — Z71.89 COORDINATION OF COMPLEX CARE: Primary | ICD-10-CM

## 2024-09-24 DIAGNOSIS — Z79.4 TYPE 2 DIABETES MELLITUS WITH DIABETIC PERIPHERAL ANGIOPATHY WITHOUT GANGRENE, WITH LONG-TERM CURRENT USE OF INSULIN (HCC): ICD-10-CM

## 2024-09-24 DIAGNOSIS — Z47.1 AFTERCARE FOLLOWING RIGHT KNEE JOINT REPLACEMENT SURGERY: Primary | ICD-10-CM

## 2024-09-24 DIAGNOSIS — K08.9 POOR DENTITION: ICD-10-CM

## 2024-09-24 PROCEDURE — 99214 OFFICE O/P EST MOD 30 MIN: CPT | Performed by: PHYSICIAN ASSISTANT

## 2024-09-24 RX ORDER — CELECOXIB 200 MG/1
200 CAPSULE ORAL 2 TIMES DAILY
Qty: 60 CAPSULE | Refills: 0 | Status: SHIPPED | OUTPATIENT
Start: 2024-09-24

## 2024-09-24 RX ORDER — TRAMADOL HYDROCHLORIDE 50 MG/1
50 TABLET ORAL EVERY 6 HOURS PRN
Qty: 42 TABLET | Refills: 0 | Status: SHIPPED | OUTPATIENT
Start: 2024-09-24

## 2024-09-24 NOTE — ASSESSMENT & PLAN NOTE
Patient with new hardware in his knee.  -Recommend OMFS evaluation for possible extractions

## 2024-09-24 NOTE — ASSESSMENT & PLAN NOTE
Right knee septic arthritis. In the setting of a previous traumatic injury with hardware failure requiring hardware removal and now has the development of a sinus tract extending into the joint space with operative findings consistent with a septic arthritis. Although previous cultures have been negative they were obtained while he has been on antibiotics. He is now status post I&D, and placement of a spacer on 7/15/2024. Patient discharged to complete a 6 week course of IV daptomycin.  Cultures were held an additional 14 days and remain negative to date.      Confirmed with Dr Hendrix the plan for the spacer is to remain in place as long an possible.  Therefore will plan to transition patient to oral doxycycline for an additional 4.5 months after he completes his IV daptomycin.      9/24/24 - patient remains on po doxycycline but is now s/p Right Revision Total Knee Arthroplasty on 9/19/24 following fall on the knee. He suffered a fracture of proximal tibia with posterior dislocation and loosening of his tibial component.  Intra operative cultures remain negative to date.  Discussed with Dr Stearns and may be able to keep antibiotic shorter in light of recent surgery with new TKR and go 6 weeks from surgery vs the full 4.5 months from the initial surgery.         Plan  -  continue doxcycline 100 mg po bid for either 6 weeks post 9/19 surgery vs the oringal 4.5 months  - CBCD, CMP in 4 weeks  -  continue follow up with ortho  - RTO 4 weeks  - ensure cultures remain negative

## 2024-09-24 NOTE — TELEPHONE ENCOUNTER
Caller: Patient    Doctor: Simeon    Reason for call: Patient request a script for Celebrex, Tramadol(low dose) and Ibuprofen(600 mg). [facility]    Call back#: [phone]

## 2024-09-24 NOTE — TELEPHONE ENCOUNTER
Spoke to pt, made aware of refilled scripts. Also, instructed he should not be taking Ibuprofen while taking Celebrex, pt verbalizes understanding and agreeable to only take celebrex. No further questions.

## 2024-09-24 NOTE — PROGRESS NOTES
Ambulatory Visit  Name: Aaron Richards      : 1965      MRN: [id number]  Encounter Provider: Osiel Hines PA-C  Encounter Date: 2024   Encounter department: [facility]    Assessment & Plan  Pyogenic arthritis of right knee joint, due to unspecified organism (HCC)  Right knee septic arthritis. In the setting of a previous traumatic injury with hardware failure requiring hardware removal and now has the development of a sinus tract extending into the joint space with operative findings consistent with a septic arthritis. Although previous cultures have been negative they were obtained while he has been on antibiotics. He is now status post I&D, and placement of a spacer on 7/15/2024. Patient discharged to complete a 6 week course of IV daptomycin.  Cultures were held an additional 14 days and remain negative to date.      Confirmed with Dr Hendrix the plan for the spacer is to remain in place as long an possible.  Therefore will plan to transition patient to oral doxycycline for an additional 4.5 months after he completes his IV daptomycin.      24 - patient remains on po doxycycline but is now s/p Right Revision Total Knee Arthroplasty on 24 following fall on the knee. He suffered a fracture of proximal tibia with posterior dislocation and loosening of his tibial component.  Intra operative cultures remain negative to date.  Discussed with Dr Stearns and may be able to keep antibiotic shorter in light of recent surgery with new TKR and go 6 weeks from surgery vs the full 4.5 months from the initial surgery.         Plan  -  continue doxcycline 100 mg po bid for either 6 weeks post  surgery vs the oringal 4.5 months  - CBCD, CMP in 4 weeks  -  continue follow up with ortho  - RTO 4 weeks  - ensure cultures remain negative         Poor dentition  Patient with new hardware in his knee.  -Recommend OMFS evaluation for possible extractions         Type 2 diabetes  mellitus with diabetic peripheral angiopathy without gangrene, with long-term current use of insulin ([facility])    Lab Results   Component Value Date    HGBA1C 6.2 (H) 07/01/2024              History of Present Illness     Aaron Richards is a 59 y.o. male who presents for virtual follow up today regarding R knee septic arthritis.  Patient is currently on po doxycycline.  He continues to tolerate it well.  Unfortunately patient recently fell on his R knee.  He suffered a fracture of proximal tibia with posterior dislocation and loosening of his tibial component. Patient underwent Right Revision Total Knee Arthroplasty on 9/19/24.  Multiple intra operative cultures were obtained and remain negative to date.  The patient was discharged home yesterday.  He is doing well.        Review of Systems   Constitutional:  Negative for chills and fever.   Respiratory:  Negative for cough and shortness of breath.    Gastrointestinal:  Negative for abdominal pain, diarrhea, nausea and vomiting.   Skin:  Negative for rash.   Psychiatric/Behavioral:  Negative for behavioral problems and confusion.            Objective     There were no vitals taken for this visit.    Physical Exam  Vitals reviewed.   Constitutional:       General: He is not in acute distress.     Appearance: Normal appearance. He is not ill-appearing, toxic-appearing or diaphoretic.   HENT:      Head: Normocephalic and atraumatic.   Eyes:      General: No scleral icterus.        Right eye: No discharge.         Left eye: No discharge.      Conjunctiva/sclera: Conjunctivae normal.   Pulmonary:      Effort: No respiratory distress.      Breath sounds: Normal breath sounds.   Musculoskeletal:      Comments: R knee dressing intact   Skin:     Coloration: Skin is not jaundiced or pale.      Findings: No erythema or rash.   Neurological:      Mental Status: He is alert and oriented to person, place, and time.   Psychiatric:         Mood and Affect: Mood normal.          Behavior: Behavior normal.           Labs:   9/22/24  Wbc: 9.69  Hgb: 8.0  Plt: 193  Cr: 0.65  Tissue cultures x3 (9/19/24) no growth to date  Fungal cultures x3 (9/19/24) no growth to date  Anaerobic cultures x 3 (9/19/24) no growth to date      Telemedicine consent    Patient: Aaron Richards  Provider: Osiel Hines PA-C  Provider located at [facility]  [address] [address] [phone]    The patient was identified by name and date of birth. Aaron Richards was informed that this is a telemedicine visit and that the visit is being conducted through the [employer] platform. He agrees to proceed..  My office door was closed. No one else was in the room.  He acknowledged consent and understanding of privacy and security of the video platform. The patient has agreed to participate and understands they can discontinue the visit at any time.    Patient is aware this is a billable service.     I spent 10 minutes with the patient during this visit. Additional time spent on chart/lab review, documentation and order placement.

## 2024-09-25 ENCOUNTER — PATIENT OUTREACH (OUTPATIENT)
Dept: CASE MANAGEMENT | Facility: OTHER | Age: 59
End: 2024-09-25

## 2024-09-25 ENCOUNTER — APPOINTMENT (OUTPATIENT)
Dept: PHYSICAL THERAPY | Facility: CLINIC | Age: 59
End: 2024-09-25
Payer: MEDICARE

## 2024-09-25 NOTE — PROGRESS NOTES
Outpatient Care Management Note:    New HRR referral received. Patient was hospitalized from 9/17-9/23/24 with periprosthetic fracture around internal prosthetic right knee joint.  On 9/19/24, he had a right arthroplasty knee total revision. He is to follow up with orthopedics and his PCP. He was referred to physical and occupational therapy.     CM called Aaron, introduced myself and the care management program. Ismael states that he is doing well. He did note that he had a little bleeding yesterday.  His wife changed the bandage and he has not had any further bleeding. He knows to monitor his incision for any redness, swelling, drainage or fevers. He will call orthopedics with any concerns.     Aaron has the contact information and will call to schedule his orthopedics and PCP follow up appts.     We reviewed all AVS instructions and Ismael denied any questions. He noted that this is his 4th surgery on this knee and he is knowlegable.     Ismael declined completing a med review noting that he had no questions.     Ismael declined ongoing outreach. He requested CM leave my contact information on his voice mail in case any questions arise.  He knows that my phone goes through my computer and I do not get messages after hours or on weekends. He is aware to call his PCP/orthopedics office directly with any immediate questions.  CM called back and left contact information.

## 2024-09-27 ENCOUNTER — TELEPHONE (OUTPATIENT)
Age: 59
End: 2024-09-27

## 2024-09-27 ENCOUNTER — APPOINTMENT (OUTPATIENT)
Dept: PHYSICAL THERAPY | Facility: CLINIC | Age: 59
End: 2024-09-27
Payer: MEDICARE

## 2024-09-27 NOTE — TELEPHONE ENCOUNTER
Hello,    Please advise if a forced appointment can be accommodated for the patient:    Call back #: [phone]    Insurance: Medicare     Reason for appointment: PO #1 ARTHROPLASTY KNEE TOTAL REVISION (Right: Knee), sx 9/19/24    Requested doctor and/or location: Simeon/Bam/Christina      Thank you.

## 2024-09-29 DIAGNOSIS — M97.11XD PERIPROSTHETIC FRACTURE AROUND INTERNAL PROSTHETIC RIGHT KNEE JOINT, SUBSEQUENT ENCOUNTER: ICD-10-CM

## 2024-09-30 ENCOUNTER — APPOINTMENT (OUTPATIENT)
Dept: PHYSICAL THERAPY | Facility: CLINIC | Age: 59
End: 2024-09-30
Payer: MEDICARE

## 2024-09-30 LAB
FUNGUS SPEC CULT: NORMAL

## 2024-09-30 RX ORDER — ACETAMINOPHEN 500 MG/1
1000 TABLET ORAL EVERY 8 HOURS
Qty: 60 TABLET | Refills: 5 | Status: SHIPPED | OUTPATIENT
Start: 2024-09-30

## 2024-09-30 NOTE — TELEPHONE ENCOUNTER
Caller: Patient    Doctor: Simeon    Reason for call:     Patient returning call for appointment, please call back.    Call back#: [phone]

## 2024-10-02 ENCOUNTER — APPOINTMENT (OUTPATIENT)
Dept: PHYSICAL THERAPY | Facility: CLINIC | Age: 59
End: 2024-10-02
Payer: MEDICARE

## 2024-10-03 ENCOUNTER — TELEPHONE (OUTPATIENT)
Age: 59
End: 2024-10-03

## 2024-10-03 ENCOUNTER — NURSE TRIAGE (OUTPATIENT)
Age: 59
End: 2024-10-03

## 2024-10-03 NOTE — TELEPHONE ENCOUNTER
-Reached out to the provider to discuss this case. Provider issued recommendations.    -Called and spoke to Mr. Richards regarding his nausea, vomiting, and diarrhea symptoms. Mr. Richards indicated that these symptoms have been occurring on and off since 9/24/2024.    -Assessed if anything precipitated the first incident and Mr. Richards did not note anything out of the ordinary occurring. Mr. Richards questioned if the doxycycline was the suspected reason for the GI distress but the provider felt since he tolerated the medication in the past there is most likley a different cause.    -The recommendations given to Mr. Richards include holding doxycycline for the rest of the day. Mr Richards is to reassess how he is feeling in the morning. He is to resume his medication and take it with food and non-dairy liquids. If he continues to have GI distress, he is to contact the office.    -Mr. Richards verbalizes understanding and has no further questions.

## 2024-10-03 NOTE — TELEPHONE ENCOUNTER
"  Incoming call:    Patient reports recent knee replacement and antibiotics considering history of infection. He has been \"wickedly sick\" since being discharged with vomiting and diarrhea. He was unsure if this was a nosocomial infection or the antibiotics so stopped taking the abx for about a week.   Denies any other symptoms or fever.    Started feeling better yesterday so restarted Doxycycline. Today, he is feeling nauseous again. Asking to speak with GABRIEL Hines.    Next dose is 11am. Advised patient to maintain hydration in meantime.       Reason for Disposition   Caller has URGENT medicine question about med that PCP or specialist prescribed and triager unable to answer question    Answer Assessment - Initial Assessment Questions  1. NAME of MEDICATION: \"What medicine are you calling about?\"      Doxycycline  2. QUESTION: \"What is your question?\" (e.g., medication refill, side effect)      Side effect  3. PRESCRIBING HCP: \"Who prescribed it?\" Reason: if prescribed by specialist, call should be referred to that group.        4. SYMPTOMS: \"Do you have any symptoms?\"      N/V/D  5. SEVERITY: If symptoms are present, ask \"Are they mild, moderate or severe?\"      severe    Protocols used: Medication Question Call-ADULT-[place]    "

## 2024-10-03 NOTE — TELEPHONE ENCOUNTER
Patient called my line to let me know he has be very sick, stomach ache, diarrhea for about a week. He stopped taking the abx he was prescribed, and it started clearing up. Once he started the abx again, he started getting sick again. He is wondering if maybe the abx is causing the GI issues. He requested either Dionne Silver or a nurse to call him back to discuss.     He did tell me that his bp has been normal and no fevers.     Please advise, thank you.

## 2024-10-04 ENCOUNTER — HOSPITAL ENCOUNTER (INPATIENT)
Facility: HOSPITAL | Age: 59
LOS: 1 days | Discharge: HOME | DRG: 386 | End: 2024-10-05
Attending: EMERGENCY MEDICINE | Admitting: STUDENT IN AN ORGANIZED HEALTH CARE EDUCATION/TRAINING PROGRAM
Payer: MEDICARE

## 2024-10-04 ENCOUNTER — APPOINTMENT (EMERGENCY)
Dept: CT IMAGING | Facility: HOSPITAL | Age: 59
DRG: 386 | End: 2024-10-04
Payer: MEDICARE

## 2024-10-04 DIAGNOSIS — R11.2 NAUSEA VOMITING AND DIARRHEA: Primary | ICD-10-CM

## 2024-10-04 DIAGNOSIS — R19.7 NAUSEA VOMITING AND DIARRHEA: Primary | ICD-10-CM

## 2024-10-04 DIAGNOSIS — K52.9 COLITIS: ICD-10-CM

## 2024-10-04 PROBLEM — Z96.659 HISTORY OF TOTAL KNEE ARTHROPLASTY: Status: ACTIVE | Noted: 2024-10-04

## 2024-10-04 LAB
2HR DELTA HS TROPONIN: 0 NG/L
4HR DELTA HS TROPONIN: 1 NG/L
ALBUMIN SERPL BCG-MCNC: 3.6 G/DL (ref 3.5–5)
ALP SERPL-CCNC: 162 U/L (ref 34–104)
ALT SERPL W P-5'-P-CCNC: 18 U/L (ref 7–52)
ANION GAP SERPL CALCULATED.3IONS-SCNC: 10 MMOL/L (ref 4–13)
AST SERPL W P-5'-P-CCNC: 25 U/L (ref 13–39)
BASOPHILS # BLD AUTO: 0.05 THOUSANDS/ΜL (ref 0–0.1)
BASOPHILS NFR BLD AUTO: 1 % (ref 0–1)
BILIRUB SERPL-MCNC: 0.49 MG/DL (ref 0.2–1)
BUN SERPL-MCNC: 11 MG/DL (ref 5–25)
CALCIUM SERPL-MCNC: 9 MG/DL (ref 8.4–10.2)
CARDIAC TROPONIN I PNL SERPL HS: 6 NG/L
CARDIAC TROPONIN I PNL SERPL HS: 6 NG/L
CARDIAC TROPONIN I PNL SERPL HS: 7 NG/L
CHLORIDE SERPL-SCNC: 107 MMOL/L (ref 96–108)
CO2 SERPL-SCNC: 21 MMOL/L (ref 21–32)
CREAT SERPL-MCNC: 0.77 MG/DL (ref 0.6–1.3)
EOSINOPHIL # BLD AUTO: 0.22 THOUSAND/ΜL (ref 0–0.61)
EOSINOPHIL NFR BLD AUTO: 2 % (ref 0–6)
ERYTHROCYTE [DISTWIDTH] IN BLOOD BY AUTOMATED COUNT: 14.3 % (ref 11.6–15.1)
GFR SERPL CREATININE-BSD FRML MDRD: 97 ML/MIN/1.73SQ M
GLUCOSE SERPL-MCNC: 92 MG/DL (ref 65–140)
HCT VFR BLD AUTO: 32 % (ref 36.5–49.3)
HGB BLD-MCNC: 10.4 G/DL (ref 12–17)
IMM GRANULOCYTES # BLD AUTO: 0.04 THOUSAND/UL (ref 0–0.2)
IMM GRANULOCYTES NFR BLD AUTO: 0 % (ref 0–2)
LACTATE SERPL-SCNC: 1.7 MMOL/L (ref 0.5–2)
LYMPHOCYTES # BLD AUTO: 2.07 THOUSANDS/ΜL (ref 0.6–4.47)
LYMPHOCYTES NFR BLD AUTO: 20 % (ref 14–44)
MCH RBC QN AUTO: 30.3 PG (ref 26.8–34.3)
MCHC RBC AUTO-ENTMCNC: 32.5 G/DL (ref 31.4–37.4)
MCV RBC AUTO: 93 FL (ref 82–98)
MONOCYTES # BLD AUTO: 0.98 THOUSAND/ΜL (ref 0.17–1.22)
MONOCYTES NFR BLD AUTO: 9 % (ref 4–12)
NEUTROPHILS # BLD AUTO: 7.14 THOUSANDS/ΜL (ref 1.85–7.62)
NEUTS SEG NFR BLD AUTO: 68 % (ref 43–75)
NRBC BLD AUTO-RTO: 0 /100 WBCS
PLATELET # BLD AUTO: 497 THOUSANDS/UL (ref 149–390)
PLATELET # BLD AUTO: 502 THOUSANDS/UL (ref 149–390)
PMV BLD AUTO: 10.5 FL (ref 8.9–12.7)
PMV BLD AUTO: 10.8 FL (ref 8.9–12.7)
POTASSIUM SERPL-SCNC: 3.7 MMOL/L (ref 3.5–5.3)
PROT SERPL-MCNC: 7.4 G/DL (ref 6.4–8.4)
RBC # BLD AUTO: 3.43 MILLION/UL (ref 3.88–5.62)
SODIUM SERPL-SCNC: 138 MMOL/L (ref 135–147)
WBC # BLD AUTO: 10.5 THOUSAND/UL (ref 4.31–10.16)

## 2024-10-04 PROCEDURE — 96374 THER/PROPH/DIAG INJ IV PUSH: CPT

## 2024-10-04 PROCEDURE — 83036 HEMOGLOBIN GLYCOSYLATED A1C: CPT | Performed by: INTERNAL MEDICINE

## 2024-10-04 PROCEDURE — 99284 EMERGENCY DEPT VISIT MOD MDM: CPT

## 2024-10-04 PROCEDURE — 96361 HYDRATE IV INFUSION ADD-ON: CPT

## 2024-10-04 PROCEDURE — 80053 COMPREHEN METABOLIC PANEL: CPT | Performed by: EMERGENCY MEDICINE

## 2024-10-04 PROCEDURE — 85025 COMPLETE CBC W/AUTO DIFF WBC: CPT | Performed by: EMERGENCY MEDICINE

## 2024-10-04 PROCEDURE — 84484 ASSAY OF TROPONIN QUANT: CPT | Performed by: EMERGENCY MEDICINE

## 2024-10-04 PROCEDURE — 90673 RIV3 VACCINE NO PRESERV IM: CPT | Performed by: INTERNAL MEDICINE

## 2024-10-04 PROCEDURE — 99223 1ST HOSP IP/OBS HIGH 75: CPT | Performed by: INTERNAL MEDICINE

## 2024-10-04 PROCEDURE — 85049 AUTOMATED PLATELET COUNT: CPT | Performed by: INTERNAL MEDICINE

## 2024-10-04 PROCEDURE — 96375 TX/PRO/DX INJ NEW DRUG ADDON: CPT

## 2024-10-04 PROCEDURE — 83605 ASSAY OF LACTIC ACID: CPT | Performed by: EMERGENCY MEDICINE

## 2024-10-04 PROCEDURE — G0008 ADMIN INFLUENZA VIRUS VAC: HCPCS | Performed by: INTERNAL MEDICINE

## 2024-10-04 PROCEDURE — 84484 ASSAY OF TROPONIN QUANT: CPT | Performed by: INTERNAL MEDICINE

## 2024-10-04 PROCEDURE — 36415 COLL VENOUS BLD VENIPUNCTURE: CPT | Performed by: EMERGENCY MEDICINE

## 2024-10-04 PROCEDURE — 74177 CT ABD & PELVIS W/CONTRAST: CPT

## 2024-10-04 PROCEDURE — 87040 BLOOD CULTURE FOR BACTERIA: CPT | Performed by: EMERGENCY MEDICINE

## 2024-10-04 PROCEDURE — 93005 ELECTROCARDIOGRAM TRACING: CPT

## 2024-10-04 RX ORDER — PANTOPRAZOLE SODIUM 40 MG/1
40 TABLET, DELAYED RELEASE ORAL
Status: DISCONTINUED | OUTPATIENT
Start: 2024-10-05 | End: 2024-10-05 | Stop reason: HOSPADM

## 2024-10-04 RX ORDER — DICYCLOMINE HCL 20 MG
20 TABLET ORAL ONCE
Status: COMPLETED | OUTPATIENT
Start: 2024-10-04 | End: 2024-10-04

## 2024-10-04 RX ORDER — ENOXAPARIN SODIUM 100 MG/ML
40 INJECTION SUBCUTANEOUS DAILY
Status: DISCONTINUED | OUTPATIENT
Start: 2024-10-05 | End: 2024-10-05 | Stop reason: HOSPADM

## 2024-10-04 RX ORDER — ONDANSETRON 2 MG/ML
4 INJECTION INTRAMUSCULAR; INTRAVENOUS ONCE
Status: COMPLETED | OUTPATIENT
Start: 2024-10-04 | End: 2024-10-04

## 2024-10-04 RX ORDER — ONDANSETRON 2 MG/ML
4 INJECTION INTRAMUSCULAR; INTRAVENOUS EVERY 6 HOURS PRN
Status: DISCONTINUED | OUTPATIENT
Start: 2024-10-04 | End: 2024-10-04

## 2024-10-04 RX ORDER — METRONIDAZOLE 500 MG/100ML
500 INJECTION, SOLUTION INTRAVENOUS EVERY 8 HOURS
Status: DISCONTINUED | OUTPATIENT
Start: 2024-10-04 | End: 2024-10-05 | Stop reason: HOSPADM

## 2024-10-04 RX ORDER — SODIUM CHLORIDE 9 MG/ML
100 INJECTION, SOLUTION INTRAVENOUS CONTINUOUS
Status: DISCONTINUED | OUTPATIENT
Start: 2024-10-04 | End: 2024-10-05 | Stop reason: HOSPADM

## 2024-10-04 RX ORDER — LEVOTHYROXINE SODIUM 175 UG/1
175 TABLET ORAL
Status: DISCONTINUED | OUTPATIENT
Start: 2024-10-05 | End: 2024-10-05 | Stop reason: HOSPADM

## 2024-10-04 RX ORDER — INSULIN LISPRO 100 [IU]/ML
1-6 INJECTION, SOLUTION INTRAVENOUS; SUBCUTANEOUS
Status: DISCONTINUED | OUTPATIENT
Start: 2024-10-05 | End: 2024-10-05 | Stop reason: HOSPADM

## 2024-10-04 RX ORDER — CIPROFLOXACIN 2 MG/ML
400 INJECTION, SOLUTION INTRAVENOUS EVERY 12 HOURS
Status: DISCONTINUED | OUTPATIENT
Start: 2024-10-04 | End: 2024-10-04

## 2024-10-04 RX ORDER — DOXYCYCLINE 100 MG/1
100 CAPSULE ORAL EVERY 12 HOURS SCHEDULED
Status: DISCONTINUED | OUTPATIENT
Start: 2024-10-04 | End: 2024-10-05 | Stop reason: HOSPADM

## 2024-10-04 RX ORDER — ACETAMINOPHEN 325 MG/1
650 TABLET ORAL EVERY 6 HOURS PRN
Status: DISCONTINUED | OUTPATIENT
Start: 2024-10-04 | End: 2024-10-05 | Stop reason: HOSPADM

## 2024-10-04 RX ORDER — ATORVASTATIN CALCIUM 40 MG/1
40 TABLET, FILM COATED ORAL
Status: DISCONTINUED | OUTPATIENT
Start: 2024-10-04 | End: 2024-10-05 | Stop reason: HOSPADM

## 2024-10-04 RX ORDER — INSULIN GLARGINE 100 [IU]/ML
30 INJECTION, SOLUTION SUBCUTANEOUS EVERY 12 HOURS SCHEDULED
Status: DISCONTINUED | OUTPATIENT
Start: 2024-10-04 | End: 2024-10-05 | Stop reason: HOSPADM

## 2024-10-04 RX ORDER — PANTOPRAZOLE SODIUM 40 MG/10ML
40 INJECTION, POWDER, LYOPHILIZED, FOR SOLUTION INTRAVENOUS ONCE
Status: COMPLETED | OUTPATIENT
Start: 2024-10-04 | End: 2024-10-04

## 2024-10-04 RX ORDER — ESCITALOPRAM OXALATE 10 MG/1
5 TABLET ORAL DAILY
Status: DISCONTINUED | OUTPATIENT
Start: 2024-10-05 | End: 2024-10-05 | Stop reason: HOSPADM

## 2024-10-04 RX ORDER — ZOLPIDEM TARTRATE 5 MG/1
10 TABLET ORAL
Status: DISCONTINUED | OUTPATIENT
Start: 2024-10-04 | End: 2024-10-05 | Stop reason: HOSPADM

## 2024-10-04 RX ORDER — FOLIC ACID 1 MG/1
1000 TABLET ORAL DAILY
Status: DISCONTINUED | OUTPATIENT
Start: 2024-10-05 | End: 2024-10-05 | Stop reason: HOSPADM

## 2024-10-04 RX ADMIN — INSULIN GLARGINE 30 UNITS: 100 INJECTION, SOLUTION SUBCUTANEOUS at 22:01

## 2024-10-04 RX ADMIN — DICYCLOMINE HYDROCHLORIDE 20 MG: 20 TABLET ORAL at 13:37

## 2024-10-04 RX ADMIN — IOHEXOL 100 ML: 350 INJECTION, SOLUTION INTRAVENOUS at 14:09

## 2024-10-04 RX ADMIN — SODIUM CHLORIDE 100 ML/HR: 0.9 INJECTION, SOLUTION INTRAVENOUS at 20:43

## 2024-10-04 RX ADMIN — ATORVASTATIN CALCIUM 40 MG: 40 TABLET, FILM COATED ORAL at 19:49

## 2024-10-04 RX ADMIN — METRONIDAZOLE 500 MG: 500 INJECTION, SOLUTION INTRAVENOUS at 19:50

## 2024-10-04 RX ADMIN — PANTOPRAZOLE SODIUM 40 MG: 40 INJECTION, POWDER, FOR SOLUTION INTRAVENOUS at 13:25

## 2024-10-04 RX ADMIN — INFLUENZA A VIRUS A/WEST VIRGINIA/30/2022 (H1N1) RECOMBINANT HEMAGGLUTININ ANTIGEN, INFLUENZA A VIRUS A/MASSACHUSETTS/18/2022 (H3N2) RECOMBINANT HEMAGGLUTININ ANTIGEN, AND INFLUENZA B VIRUS B/AUSTRIA/1359417/2021 RECOMBINANT HEMAGGLUTININ ANTIGEN 0.5 ML: 45; 45; 45 INJECTION INTRAMUSCULAR at 22:01

## 2024-10-04 RX ADMIN — ASPIRIN 81 MG: 81 TABLET, COATED ORAL at 20:43

## 2024-10-04 RX ADMIN — ONDANSETRON 4 MG: 2 INJECTION INTRAMUSCULAR; INTRAVENOUS at 13:24

## 2024-10-04 RX ADMIN — SODIUM CHLORIDE 1000 ML: 0.9 INJECTION, SOLUTION INTRAVENOUS at 13:21

## 2024-10-04 NOTE — ASSESSMENT & PLAN NOTE
CT abd: Left-sided colitis, presumably due to infection versus inflammatory bowel disease.  Cholelithiasis.  F/u stool studies  Cont IVF  Cont Zosyn, Flagyl  ID consult  GI consult

## 2024-10-04 NOTE — ASSESSMENT & PLAN NOTE
"Lab Results   Component Value Date    HGBA1C 6.2 (H) 07/01/2024       No results for input(s): \"POCGLU\" in the last 72 hours.    Blood Sugar Average: Last 72 hrs:    Diabetic diet  Continue home regimen with Lantus 30 units twice daily and sliding scale with fingersticks prior to meals  Follow-up hemoglobin A1c   "

## 2024-10-04 NOTE — H&P
"H&P - Hospitalist   Name: Aaron Richards 59 y.o. male I MRN: [id number]  Unit/Bed#: ED 26 I Date of Admission: 10/4/2024   Date of Service: 10/4/2024 I Hospital Day: 0     Assessment & Plan  Colitis  CT abd: Left-sided colitis, presumably due to infection versus inflammatory bowel disease.  Cholelithiasis.  F/u stool studies  Cont IVF  Cont Zosyn, Flagyl  ID consult  GI consult  Nausea & vomiting  His Qtc is 518 , started coverage with tigan  Cont IVF  Monitor electrolytes  History of total knee arthroplasty  on 9/19/24 underwent an uncomplicated right total knee arthroplasty revision  Continue doxycycline  Gastroesophageal reflux disease without esophagitis  Continue Protonix  PAD (peripheral artery disease) ([facility])  Continue aspirin, statin  Type 2 diabetes mellitus without complication, with long-term current use of insulin ([facility])  Lab Results   Component Value Date    HGBA1C 6.2 (H) 07/01/2024       No results for input(s): \"POCGLU\" in the last 72 hours.    Blood Sugar Average: Last 72 hrs:    Diabetic diet  Continue home regimen with Lantus 30 units twice daily and sliding scale with fingersticks prior to meals  Follow-up hemoglobin A1c       VTE Pharmacologic Prophylaxis:   Moderate Risk (Score 3-4) - Pharmacological DVT Prophylaxis Ordered: enoxaparin (Lovenox).  Code Status: Level 1 - Full Code confirmed with patient  Discussion with family:   .     Anticipated Length of Stay: Patient will be admitted on an observation basis with an anticipated length of stay of less than 2 midnights secondary to colitis.    History of Present Illness   Chief Complaint: Diarrhea    Aaron Richards is a 59 y.o. male with a PMH of PAD, diabetes mellitus type 2, GERD who comes in with complaining of nausea, vomiting, diarrhea for the past week.  Patient was admitted to the hospital on 9/17/2024 and on 9/19/24 underwent an uncomplicated right total knee arthroplasty revision due to right knee septic arthritis in the setting of " a previous traumatic injury with hardware failure requiring hardware removal .  AT that time pt was discharged home to complete a 6 week course of IV daptomycin and was transitioned to doxycycline as per ID. Intra operative cultures remain negative to date. Plan was as per ID to continue doxcycline 100 mg po bid for either 6 weeks post 9/19 surgery vs the oringal 4.5 months.   Patient reports that he is having frequent episodes of diarrhea for the past week, denies any blood in that, reports mucus, abdominal pain and chills, also associated nausea and vomiting.  Today he had bowel movement x 1.  Patient denies any fevers.   Today CT abd: Left-sided colitis, presumably due to infection versus inflammatory bowel disease.  Cholelithiasis.  In ED patient was given 1 L normal saline bolus, Zofran, Protonix    Review of Systems   Constitutional:  Positive for chills. Negative for activity change, appetite change, diaphoresis, fatigue, fever and unexpected weight change.   HENT: Negative.     Respiratory: Negative.     Cardiovascular: Negative.    Gastrointestinal:  Positive for abdominal pain, diarrhea, nausea and vomiting. Negative for abdominal distention, anal bleeding, blood in stool, constipation and rectal pain.   Endocrine: Negative.    Genitourinary: Negative.    Musculoskeletal: Negative.    Skin: Negative.    Neurological: Negative.    Psychiatric/Behavioral: Negative.         Historical Information   Past Medical History:   Diagnosis Date    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    Chronic kidney disease     Colon polyp     Constipation 03/09/2023    Diabetes mellitus (HCC)     Disease of thyroid gland     GERD (gastroesophageal reflux disease)     HHS vs DKA 11/16/2018    Hyperlipidemia     Hypertension     Thyroid cancer (HCC)      Past Surgical History:   Procedure Laterality Date    COLONOSCOPY      FOOT AMPUTATION Right     FOOT SURGERY Right 2014    IR AORTAGRAM WITH RUN-OFF  08/06/2020     IR TUNNELED CENTRAL LINE PLACEMENT  09/08/2020    IR TUNNELED CENTRAL LINE REMOVAL  09/28/2020    WI AMPUTATION FOOT TRANSMETARSAL Right 09/03/2020    Procedure: AMPUTATION TRANSMETATARSAL (TMA);  Surgeon: Tracy Murillo DPM;  Location: [facility];  Service: Podiatry    WI ARTHRP KNE CONDYLE&PLATU MEDIAL&LAT COMPARTMENTS Right 7/15/2024    Procedure: ARTHROPLASTY KNEE TOTAL;  Surgeon: Jorge Hendrix DO;  Location[facility];  Service: Orthopedics    WI OPEN TX TIBIAL FRACTURE PROXIMAL UNICONDYLAR Right 5/1/2023    Procedure: OPEN REDUCTION W/ INTERNAL FIXATION (ORIF) RIGHT TIBIAL PLATEAU NONUNION;  Surgeon: Sesar Hilario MD;  Location: [facility];  Service: Orthopedics    WI REMOVAL IMPLANT DEEP Right 1/22/2024    Procedure: REMOVAL HARDWARE TIBIA;  Surgeon: Sesar Hilario MD;  Location: [facility];  Service: Orthopedics    WI THYROIDECTOMY TOTAL/COMPLETE N/A 02/03/2021    Procedure: TOTAL THYROIDECTOMY WITH NIMS MONITORING;  Surgeon: Sesar Garvey MD;  Location: [facility];  Service: ENT    TOE AMPUTATION Right 08/11/2020    Procedure: AMPUTATION TOE;  Surgeon: Tracy Murillo DPM;  [facility]: [facility];  Service: Podiatry    TOTAL KNEE ARTHROPLASTY REVISION Right 9/19/2024    Procedure: ARTHROPLASTY KNEE TOTAL REVISION;  Surgeon: Jorge Hendrix DO;  Location[facility];  Service: Orthopedics    US GUIDED THYROID BIOPSY  07/02/2020     Social History     Tobacco Use    Smoking status: Never    Smokeless tobacco: Never   Vaping Use    Vaping status: Never Used   Substance and Sexual Activity    Alcohol use: Not Currently     Comment: quit 1-2023    Drug use: Not Currently     Types: Marijuana    Sexual activity: Not Currently     Partners: Female     E-Cigarette/Vaping    E-Cigarette Use Never User      E-Cigarette/Vaping Substances    Nicotine No     THC No     CBD No     Flavoring No     Other No     Unknown No        Social History:  Marital Status: Single   Occupation:   Patient  Pre-hospital Living Situation: Home  Patient Pre-hospital Level of Mobility: walks  Patient Pre-hospital Diet Restrictions:     Objective :  Temp:  [98.1 °F (36.7 °C)] 98.1 °F (36.7 °C)  HR:  [79-91] 90  BP: (156-175)/() 175/95  Resp:  [11-32] 32  SpO2:  [99 %-100 %] 100 %  O2 Device: None (Room air)    Physical Exam  Constitutional:       General: He is not in acute distress.     Appearance: Normal appearance. He is obese. He is not ill-appearing, toxic-appearing or diaphoretic.   HENT:      Head: Normocephalic and atraumatic.      Nose: Nose normal.      Mouth/Throat:      Mouth: Mucous membranes are moist.   Eyes:      Extraocular Movements: Extraocular movements intact.      Pupils: Pupils are equal, round, and reactive to light.   Neck:      Vascular: No carotid bruit.   Cardiovascular:      Rate and Rhythm: Normal rate and regular rhythm.      Pulses: Normal pulses.      Heart sounds: Normal heart sounds. No murmur heard.     No friction rub. No gallop.   Pulmonary:      Effort: Pulmonary effort is normal. No respiratory distress.      Breath sounds: Normal breath sounds. No stridor. No wheezing, rhonchi or rales.   Chest:      Chest wall: No tenderness.   Abdominal:      General: Abdomen is flat. Bowel sounds are normal. There is distension.      Palpations: Abdomen is soft. There is no mass.      Tenderness: There is abdominal tenderness. There is no right CVA tenderness, left CVA tenderness, guarding or rebound.      Hernia: No hernia is present.   Musculoskeletal:      Cervical back: Normal range of motion and neck supple. No rigidity or tenderness.      Comments: Right knee healing scar, appears noninfected   Lymphadenopathy:      Cervical: No cervical adenopathy.   Skin:     General: Skin is warm and dry.      Capillary Refill: Capillary refill takes less than 2 seconds.   Neurological:      General: No focal deficit present.      Mental Status: He is alert and oriented to person, place, and time.  Mental status is at baseline.      Cranial Nerves: No cranial nerve deficit.      Sensory: No sensory deficit.      Motor: No weakness.      Coordination: Coordination normal.      Gait: Gait normal.      Deep Tendon Reflexes: Reflexes normal.   Psychiatric:         Mood and Affect: Mood normal.         Behavior: Behavior normal.         Thought Content: Thought content normal.         Judgment: Judgment normal.         Lines/Drains:            Lab Results: I have reviewed the following results:  Results from last 7 days   Lab Units 10/04/24  1309   WBC Thousand/uL 10.50*   HEMOGLOBIN g/dL 10.4*   HEMATOCRIT % 32.0*   PLATELETS Thousands/uL 497*   SEGS PCT % 68   LYMPHO PCT % 20   MONO PCT % 9   EOS PCT % 2     Results from last 7 days   Lab Units 10/04/24  1309   SODIUM mmol/L 138   POTASSIUM mmol/L 3.7   CHLORIDE mmol/L 107   CO2 mmol/L 21   BUN mg/dL 11   CREATININE mg/dL 0.77   ANION GAP mmol/L 10   CALCIUM mg/dL 9.0   ALBUMIN g/dL 3.6   TOTAL BILIRUBIN mg/dL 0.49   ALK PHOS U/L 162*   ALT U/L 18   AST U/L 25   GLUCOSE RANDOM mg/dL 92             Lab Results   Component Value Date    HGBA1C 6.2 (H) 07/01/2024    HGBA1C 6.2 (H) 10/19/2023    HGBA1C 7.9 (H) 04/24/2023     Results from last 7 days   Lab Units 10/04/24  1309   LACTIC ACID mmol/L 1.7             Administrative Statements       ** Please Note: This note has been constructed using a voice recognition system. **

## 2024-10-04 NOTE — ASSESSMENT & PLAN NOTE
on 9/19/24 underwent an uncomplicated right total knee arthroplasty revision  Continue doxycycline

## 2024-10-05 VITALS
HEART RATE: 79 BPM | TEMPERATURE: 97 F | HEIGHT: 76 IN | DIASTOLIC BLOOD PRESSURE: 94 MMHG | SYSTOLIC BLOOD PRESSURE: 146 MMHG | OXYGEN SATURATION: 99 % | WEIGHT: 233.47 LBS | RESPIRATION RATE: 20 BRPM | BODY MASS INDEX: 28.43 KG/M2

## 2024-10-05 PROBLEM — E87.6 HYPOKALEMIA: Status: ACTIVE | Noted: 2024-10-05

## 2024-10-05 LAB
ANION GAP SERPL CALCULATED.3IONS-SCNC: 9 MMOL/L (ref 4–13)
ATRIAL RATE: 90 BPM
BUN SERPL-MCNC: 9 MG/DL (ref 5–25)
CALCIUM SERPL-MCNC: 8.4 MG/DL (ref 8.4–10.2)
CHLORIDE SERPL-SCNC: 109 MMOL/L (ref 96–108)
CO2 SERPL-SCNC: 21 MMOL/L (ref 21–32)
CREAT SERPL-MCNC: 0.85 MG/DL (ref 0.6–1.3)
ERYTHROCYTE [DISTWIDTH] IN BLOOD BY AUTOMATED COUNT: 14.3 % (ref 11.6–15.1)
EST. AVERAGE GLUCOSE BLD GHB EST-MCNC: 126 MG/DL
GFR SERPL CREATININE-BSD FRML MDRD: 94 ML/MIN/1.73SQ M
GLUCOSE SERPL-MCNC: 100 MG/DL (ref 65–140)
GLUCOSE SERPL-MCNC: 110 MG/DL (ref 65–140)
GLUCOSE SERPL-MCNC: 116 MG/DL (ref 65–140)
GLUCOSE SERPL-MCNC: 49 MG/DL (ref 65–140)
GLUCOSE SERPL-MCNC: 76 MG/DL (ref 65–140)
GLUCOSE SERPL-MCNC: 89 MG/DL (ref 65–140)
HBA1C MFR BLD: 6 %
HCT VFR BLD AUTO: 31 % (ref 36.5–49.3)
HGB BLD-MCNC: 10 G/DL (ref 12–17)
MAGNESIUM SERPL-MCNC: 1.9 MG/DL (ref 1.9–2.7)
MCH RBC QN AUTO: 30.1 PG (ref 26.8–34.3)
MCHC RBC AUTO-ENTMCNC: 32.3 G/DL (ref 31.4–37.4)
MCV RBC AUTO: 93 FL (ref 82–98)
P AXIS: 32 DEGREES
PLATELET # BLD AUTO: 476 THOUSANDS/UL (ref 149–390)
PMV BLD AUTO: 10.4 FL (ref 8.9–12.7)
POTASSIUM SERPL-SCNC: 3 MMOL/L (ref 3.5–5.3)
PR INTERVAL: 160 MS
QRS AXIS: -25 DEGREES
QRSD INTERVAL: 100 MS
QT INTERVAL: 424 MS
QTC INTERVAL: 518 MS
RBC # BLD AUTO: 3.32 MILLION/UL (ref 3.88–5.62)
SODIUM SERPL-SCNC: 139 MMOL/L (ref 135–147)
T WAVE AXIS: -4 DEGREES
VENTRICULAR RATE: 90 BPM
WBC # BLD AUTO: 8.91 THOUSAND/UL (ref 4.31–10.16)

## 2024-10-05 PROCEDURE — 80048 BASIC METABOLIC PNL TOTAL CA: CPT | Performed by: INTERNAL MEDICINE

## 2024-10-05 PROCEDURE — 82948 REAGENT STRIP/BLOOD GLUCOSE: CPT

## 2024-10-05 PROCEDURE — 85027 COMPLETE CBC AUTOMATED: CPT | Performed by: INTERNAL MEDICINE

## 2024-10-05 PROCEDURE — 87505 NFCT AGENT DETECTION GI: CPT | Performed by: INTERNAL MEDICINE

## 2024-10-05 PROCEDURE — 99239 HOSP IP/OBS DSCHRG MGMT >30: CPT | Performed by: STUDENT IN AN ORGANIZED HEALTH CARE EDUCATION/TRAINING PROGRAM

## 2024-10-05 PROCEDURE — 83735 ASSAY OF MAGNESIUM: CPT | Performed by: INTERNAL MEDICINE

## 2024-10-05 PROCEDURE — 93010 ELECTROCARDIOGRAM REPORT: CPT | Performed by: STUDENT IN AN ORGANIZED HEALTH CARE EDUCATION/TRAINING PROGRAM

## 2024-10-05 RX ORDER — POTASSIUM CHLORIDE 1500 MG/1
40 TABLET, EXTENDED RELEASE ORAL ONCE
Status: COMPLETED | OUTPATIENT
Start: 2024-10-05 | End: 2024-10-05

## 2024-10-05 RX ORDER — METRONIDAZOLE 500 MG/1
500 TABLET ORAL EVERY 8 HOURS SCHEDULED
Qty: 15 TABLET | Refills: 0 | Status: SHIPPED | OUTPATIENT
Start: 2024-10-05 | End: 2024-10-10

## 2024-10-05 RX ORDER — CIPROFLOXACIN 500 MG/1
500 TABLET, FILM COATED ORAL EVERY 12 HOURS SCHEDULED
Qty: 10 TABLET | Refills: 0 | Status: SHIPPED | OUTPATIENT
Start: 2024-10-05 | End: 2024-10-10

## 2024-10-05 RX ORDER — POTASSIUM CHLORIDE 1500 MG/1
20 TABLET, EXTENDED RELEASE ORAL 2 TIMES DAILY
Qty: 10 TABLET | Refills: 0 | Status: SHIPPED | OUTPATIENT
Start: 2024-10-05 | End: 2024-10-10

## 2024-10-05 RX ADMIN — PIPERACILLIN AND TAZOBACTAM 4.5 G: 36; 4.5 INJECTION, POWDER, FOR SOLUTION INTRAVENOUS at 00:29

## 2024-10-05 RX ADMIN — INSULIN GLARGINE 30 UNITS: 100 INJECTION, SOLUTION SUBCUTANEOUS at 08:44

## 2024-10-05 RX ADMIN — PIPERACILLIN AND TAZOBACTAM 4.5 G: 36; 4.5 INJECTION, POWDER, FOR SOLUTION INTRAVENOUS at 04:44

## 2024-10-05 RX ADMIN — LEVOTHYROXINE SODIUM 175 MCG: 175 TABLET ORAL at 06:16

## 2024-10-05 RX ADMIN — METRONIDAZOLE 500 MG: 500 INJECTION, SOLUTION INTRAVENOUS at 03:44

## 2024-10-05 RX ADMIN — POTASSIUM CHLORIDE 40 MEQ: 1500 TABLET, EXTENDED RELEASE ORAL at 11:24

## 2024-10-05 RX ADMIN — ASPIRIN 81 MG: 81 TABLET, COATED ORAL at 08:46

## 2024-10-05 RX ADMIN — PANTOPRAZOLE SODIUM 40 MG: 40 TABLET, DELAYED RELEASE ORAL at 06:16

## 2024-10-05 RX ADMIN — FOLIC ACID 1000 MCG: 1 TABLET ORAL at 08:45

## 2024-10-05 RX ADMIN — ESCITALOPRAM OXALATE 5 MG: 10 TABLET ORAL at 08:45

## 2024-10-05 RX ADMIN — TRIMETHOBENZAMIDE HYDROCHLORIDE 200 MG: 100 INJECTION INTRAMUSCULAR at 04:16

## 2024-10-05 NOTE — NURSING NOTE
Pt is refusing IV fluids.  This nurse explained to him the reason for the fluids and he verbally understands, but still does not want it.

## 2024-10-05 NOTE — ASSESSMENT & PLAN NOTE
CT imaging showing mild colitis  Was having diarrhea but now resolving  Patient had solid stool formation today  Tolerating regular diet   Received IV zosyn inpatient  Transition to cipro/flagyl for 5 days  Stable for discharge

## 2024-10-05 NOTE — ASSESSMENT & PLAN NOTE
Likely from GI losses  Replete potassium and provide PO supplementation outpatient  Recheck BMP in 1 week outpatient

## 2024-10-05 NOTE — ASSESSMENT & PLAN NOTE
Lab Results   Component Value Date    HGBA1C 6.2 (H) 07/01/2024       Recent Labs     10/05/24  0141 10/05/24  0243 10/05/24  0756 10/05/24  1118   POCGLU 76 110 89 100       Blood Sugar Average: Last 72 hrs:  (P) 84.8  Adequately controlled  Continue home regimen

## 2024-10-05 NOTE — ASSESSMENT & PLAN NOTE
His Qtc is 518 , started coverage with tigan  Counseled on not taking home zofran. Also in discharge instructions

## 2024-10-05 NOTE — DISCHARGE SUMMARY
Discharge Summary - Hospitalist   Name: Aaron Richards 59 y.o. male I MRN: [id number]  Unit/Bed#: -01 I Date of Admission: 10/4/2024   Date of Service: 10/5/2024 I Hospital Day: 0     Assessment & Plan  Colitis  CT imaging showing mild colitis  Was having diarrhea but now resolving  Patient had solid stool formation today  Tolerating regular diet   Received IV zosyn inpatient  Transition to cipro/flagyl for 5 days  Stable for discharge   Nausea & vomiting  His Qtc is 518 , started coverage with tigan  Counseled on not taking home zofran. Also in discharge instructions  History of total knee arthroplasty  On 9/19/24 underwent an uncomplicated right total knee arthroplasty revision  Continue doxycycline  Gastroesophageal reflux disease without esophagitis  Continue Protonix  PAD (peripheral artery disease) ([facility])  Continue aspirin, statin  Type 2 diabetes mellitus without complication, with long-term current use of insulin ([facility])  Lab Results   Component Value Date    HGBA1C 6.2 (H) 07/01/2024       Recent Labs     10/05/24  0141 10/05/24  0243 10/05/24  0756 10/05/24  1118   POCGLU 76 110 89 100       Blood Sugar Average: Last 72 hrs:  (P) 84.8  Adequately controlled  Continue home regimen  Hypokalemia  Likely from GI losses  Replete potassium and provide PO supplementation outpatient  Recheck BMP in 1 week outpatient     Medical Problems       Resolved Problems  Date Reviewed: 10/4/2024   None       Discharging Physician / Practitioner: Hong Fajardo MD  PCP: Oswaldo Prather MD  Admission Date:   Admission Orders (From admission, onward)       Ordered        10/05/24 0926  INPATIENT ADMISSION  Once            10/04/24 1910  Place in Observation  Once                          Discharge Date: 10/05/24    Consultations During Hospital Stay:  None      Procedures Performed:   imaging    Significant Findings / Test Results:   Principal Problem:    Colitis  Active Problems:    Gastroesophageal reflux disease  without esophagitis    PAD (peripheral artery disease) (HCC)    Type 2 diabetes mellitus without complication, with long-term current use of insulin (HCC)    Nausea & vomiting    History of total knee arthroplasty    Hypokalemia  Resolved Problems:    * No resolved hospital problems. *        Incidental Findings:   See above    Test Results Pending at Discharge (will require follow up):   na     Outpatient Tests Requested:  Follow up with PCP and orthopedic surgeon    Complications:  na    Reason for Admission: colitis    Hospital Course:   Aaron Richards is a 59 y.o. male patient who originally presented to the hospital on 10/4/2024 due to colitis. Improved with IV zosyn. Diarrhea resolved along with nausea. Transitioned to PO antibiotics. Now stable for discharge.     Condition at Discharge: good    Discharge Day Visit / Exam:   * Please refer to separate progress note for these details *    Discussion with Family: Patient declined call to .     Discharge instructions/Information to patient and family:   See after visit summary for information provided to patient and family.      Provisions for Follow-Up Care:  See after visit summary for information related to follow-up care and any pertinent home health orders.      Mobility at time of Discharge:   Basic Mobility Inpatient Raw Score: 18  JH-HLM Goal: 6: Walk 10 steps or more  JH-HLM Achieved: 6: Walk 10 steps or more  HLM Goal achieved. Continue to encourage appropriate mobility.     Disposition:   Home    Planned Readmission: none    Discharge Medications:  See after visit summary for reconciled discharge medications provided to patient and/or family.      Administrative Statements   Discharge Statement:  I have spent a total time of 30+ minutes in caring for this patient on the day of the visit/encounter. >30 minutes of time was spent on: Counseling / Coordination of care, Documenting in the medical record, and Reviewing / ordering tests,  medicine, procedures  .    **Please Note: This note may have been constructed using a voice recognition system**

## 2024-10-05 NOTE — DISCHARGE INSTR - AVS FIRST PAGE
Dear Aaron Richards,     It was our pleasure to care for you here at [facility].  It is our hope that we were always able to exceed the expected standards for your care during your stay.  You were hospitalized due to colitis.  You were cared for on the 3rd floor by Hong Fajardo MD under the service of Hong Moura MD with the [facility] who covers for your primary care physician (PCP), Oswaldo Prather MD, while you were hospitalized.  If you have any questions or concerns related to this hospitalization, you may contact us at .  For follow up as well as any medication refills, we recommend that you follow up with your primary care physician.  A registered nurse will reach out to you by phone within a few days after your discharge to answer any additional questions that you may have after going home.  However, at this time we provide for you here, the most important instructions / recommendations at discharge:     Notable Medication Adjustments -   Added potassium supplement- take for the next 5 days  Added cipro/flagyl- antibiotics for colitis  Continue your home doxycycline for your knee replacement. Do not stop taking  Stop taking zofran- you have a prolonged QT interval on EKG, this could lead to abnormal rhythms of your heart. Do not take zofran  Testing Required after Discharge -   Repeat labs in 1 week to monitor potassium levels   Important follow up information -   na  Other Instructions -   na  Please review this entire after visit summary as additional general instructions including medication list, appointments, activity, diet, any pertinent wound care, and other additional recommendations from your care team that may be provided for you.      Sincerely,     Hong Fajardo MD

## 2024-10-05 NOTE — ASSESSMENT & PLAN NOTE
On 9/19/24 underwent an uncomplicated right total knee arthroplasty revision  Continue doxycycline

## 2024-10-05 NOTE — PLAN OF CARE
Problem: Potential for Falls  Goal: Patient will remain free of falls  Description: INTERVENTIONS:  - Assess patient frequently for physical needs  -  Identify cognitive and physical deficits and behaviors that affect risk of falls    -  Mather fall precautions as indicated by assessment   - Educate patient/family on patient safety including physical limitations  - Instruct patient to call for assistance with activity based on assessment  - Modify environment to reduce risk of injury  - Consider OT/PT consult to assist with strengthening/mobility  Outcome: Progressing     Problem: PAIN - ADULT  Goal: Verbalizes/displays adequate comfort level or baseline comfort level  Description: Interventions:  - Encourage patient to monitor pain and request assistance  - Assess pain using appropriate pain scale  - Administer analgesics based on type and severity of pain and evaluate response  - Implement non-pharmacological measures as appropriate and evaluate response  - Consider cultural and social influences on pain and pain management  - Notify physician/advanced practitioner if interventions unsuccessful or patient reports new pain  Outcome: Progressing     Problem: INFECTION - ADULT  Goal: Absence or prevention of progression during hospitalization  Description: INTERVENTIONS:  - Assess and monitor for signs and symptoms of infection  - Monitor lab/diagnostic results  - Monitor all insertion sites, i e  indwelling lines, tubes, and drains  - Monitor endotracheal if appropriate and nasal secretions for changes in amount and color  - Mather appropriate cooling/warming therapies per order  - Administer medications as ordered  - Instruct and encourage patient and family to use good hand hygiene technique  - Identify and instruct in appropriate isolation precautions for identified infection/condition  Outcome: Progressing  Goal: Absence of fever/infection during neutropenic period  Description: INTERVENTIONS:  - Monitor Admission Reconciliation is Completed  Discharge Reconciliation is Not Complete WBC    Outcome: Progressing     Problem: SAFETY ADULT  Goal: Patient will remain free of falls  Description: INTERVENTIONS:  - Assess patient frequently for physical needs  -  Identify cognitive and physical deficits and behaviors that affect risk of falls    -  Jamul fall precautions as indicated by assessment   - Educate patient/family on patient safety including physical limitations  - Instruct patient to call for assistance with activity based on assessment  - Modify environment to reduce risk of injury  - Consider OT/PT consult to assist with strengthening/mobility  Outcome: Progressing  Goal: Maintain or return to baseline ADL function  Description: INTERVENTIONS:  -  Assess patient's ability to carry out ADLs; assess patient's baseline for ADL function and identify physical deficits which impact ability to perform ADLs (bathing, care of mouth/teeth, toileting, grooming, dressing, etc )  - Assess/evaluate cause of self-care deficits   - Assess range of motion  - Assess patient's mobility; develop plan if impaired  - Assess patient's need for assistive devices and provide as appropriate  - Encourage maximum independence but intervene and supervise when necessary  - Involve family in performance of ADLs  - Assess for home care needs following discharge   - Consider OT consult to assist with ADL evaluation and planning for discharge  - Provide patient education as appropriate  Outcome: Progressing  Goal: Maintain or return mobility status to optimal level  Description: INTERVENTIONS:  - Assess patient's baseline mobility status (ambulation, transfers, stairs, etc )    - Identify cognitive and physical deficits and behaviors that affect mobility  - Identify mobility aids required to assist with transfers and/or ambulation (gait belt, sit-to-stand, lift, walker, cane, etc )  - Jamul fall precautions as indicated by assessment  - Record patient progress and toleration of activity level on Mobility SBAR; progress patient to next Phase/Stage  - Instruct patient to call for assistance with activity based on assessment  - Consider rehabilitation consult to assist with strengthening/weightbearing, etc   Outcome: Progressing     Problem: DISCHARGE PLANNING  Goal: Discharge to home or other facility with appropriate resources  Description: INTERVENTIONS:  - Identify barriers to discharge w/patient and caregiver  - Arrange for needed discharge resources and transportation as appropriate  - Identify discharge learning needs (meds, wound care, etc )  - Arrange for interpretive services to assist at discharge as needed  - Refer to Case Management Department for coordinating discharge planning if the patient needs post-hospital services based on physician/advanced practitioner order or complex needs related to functional status, cognitive ability, or social support system  Outcome: Progressing     Problem: Knowledge Deficit  Goal: Patient/family/caregiver demonstrates understanding of disease process, treatment plan, medications, and discharge instructions  Description: Complete learning assessment and assess knowledge base  Interventions:  - Provide teaching at level of understanding  - Provide teaching via preferred learning methods  Outcome: Progressing     Problem: Nutrition/Hydration-ADULT  Goal: Nutrient/Hydration intake appropriate for improving, restoring or maintaining nutritional needs  Description: Monitor and assess patient's nutrition/hydration status for malnutrition  Collaborate with interdisciplinary team and initiate plan and interventions as ordered  Monitor patient's weight and dietary intake as ordered or per policy  Utilize nutrition screening tool and intervene as necessary  Determine patient's food preferences and provide high-protein, high-caloric foods as appropriate       INTERVENTIONS:  - Monitor oral intake, urinary output, labs, and treatment plans  - Assess nutrition and hydration status and recommend course of action  - Evaluate amount of meals eaten  - Assist patient with eating if necessary   - Allow adequate time for meals  - Recommend/ encourage appropriate diets, oral nutritional supplements, and vitamin/mineral supplements  - Order, calculate, and assess calorie counts as needed  - Recommend, monitor, and adjust tube feedings and TPN/PPN based on assessed needs  - Assess need for intravenous fluids  - Provide specific nutrition/hydration education as appropriate  - Include patient/family/caregiver in decisions related to nutrition  Outcome: Progressing     Problem: Prexisting or High Potential for Compromised Skin Integrity  Goal: Skin integrity is maintained or improved  Description: INTERVENTIONS:  - Identify patients at risk for skin breakdown  - Assess and monitor skin integrity  - Assess and monitor nutrition and hydration status  - Monitor labs   - Assess for incontinence   - Turn and reposition patient  - Assist with mobility/ambulation  - Relieve pressure over bony prominences  - Avoid friction and shearing  - Provide appropriate hygiene as needed including keeping skin clean and dry  - Evaluate need for skin moisturizer/barrier cream  - Collaborate with interdisciplinary team   - Patient/family teaching  - Consider wound care consult   Outcome: Progressing Admission Reconciliation is Completed  Discharge Reconciliation is Completed

## 2024-10-06 LAB
C COLI+JEJUNI TUF STL QL NAA+PROBE: NEGATIVE
EC STX1+STX2 GENES STL QL NAA+PROBE: NEGATIVE
SALMONELLA SP SPAO STL QL NAA+PROBE: NEGATIVE
SHIGELLA SP+EIEC IPAH STL QL NAA+PROBE: NEGATIVE

## 2024-10-07 ENCOUNTER — TRANSITIONAL CARE MANAGEMENT (OUTPATIENT)
Dept: FAMILY MEDICINE CLINIC | Facility: CLINIC | Age: 59
End: 2024-10-07

## 2024-10-07 ENCOUNTER — TELEPHONE (OUTPATIENT)
Dept: INFECTIOUS DISEASES | Facility: CLINIC | Age: 59
End: 2024-10-07

## 2024-10-07 ENCOUNTER — APPOINTMENT (OUTPATIENT)
Dept: PHYSICAL THERAPY | Facility: CLINIC | Age: 59
End: 2024-10-07
Payer: MEDICARE

## 2024-10-07 LAB
FUNGUS SPEC CULT: NORMAL

## 2024-10-07 NOTE — TELEPHONE ENCOUNTER
Hi.  Our nurse reached out to patient last week as I was out of the office.  Dr Juarez did not think it was secondary to the doxy as he had been on it for a while.  Also looks like patient went to the ED and noted to have colitis.  We will reach out to him today to see how he is doing.  We will try and continue the doxy at this time.

## 2024-10-07 NOTE — TELEPHONE ENCOUNTER
-Called and spoke with Mr. Richards regarding his symptoms. Mr. Richards indicated that after presenting to the ED his GI symptoms have begun to improve.     -Asked Mr. Richards how has his symptoms been while continuing the Doxycycline. He stated that he is doing well with it in conjunction to the other antibiotics he received from the recent hospitalization.     -Mr. Richards had no questions and understands that he can call the infectious disease office if he has any questions related to his doxycycline.

## 2024-10-09 ENCOUNTER — APPOINTMENT (OUTPATIENT)
Dept: PHYSICAL THERAPY | Facility: CLINIC | Age: 59
End: 2024-10-09
Payer: MEDICARE

## 2024-10-09 LAB
BACTERIA BLD CULT: NORMAL
BACTERIA BLD CULT: NORMAL

## 2024-10-11 ENCOUNTER — TELEPHONE (OUTPATIENT)
Age: 59
End: 2024-10-11

## 2024-10-11 NOTE — TELEPHONE ENCOUNTER
Hello,    Please advise if a forced appointment can be accommodated for the patient:    Call back #: [phone]    Insurance: Medicare    Reason for appointment: 1st po - 9/19 - 12/18/24 [facility] REV. Missed previous appt due to illness    Requested doctor and/or location: [facility]      Thank you.

## 2024-10-14 ENCOUNTER — OFFICE VISIT (OUTPATIENT)
Dept: FAMILY MEDICINE CLINIC | Facility: CLINIC | Age: 59
End: 2024-10-14
Payer: MEDICARE

## 2024-10-14 VITALS
SYSTOLIC BLOOD PRESSURE: 126 MMHG | DIASTOLIC BLOOD PRESSURE: 86 MMHG | HEIGHT: 76 IN | HEART RATE: 78 BPM | TEMPERATURE: 97.9 F | BODY MASS INDEX: 27.4 KG/M2 | WEIGHT: 225 LBS | OXYGEN SATURATION: 98 %

## 2024-10-14 DIAGNOSIS — I10 HYPERTENSION, ESSENTIAL: ICD-10-CM

## 2024-10-14 DIAGNOSIS — E78.5 HYPERLIPIDEMIA, UNSPECIFIED HYPERLIPIDEMIA TYPE: ICD-10-CM

## 2024-10-14 DIAGNOSIS — K52.9 COLITIS: Primary | ICD-10-CM

## 2024-10-14 DIAGNOSIS — I73.9 PAD (PERIPHERAL ARTERY DISEASE) (HCC): ICD-10-CM

## 2024-10-14 DIAGNOSIS — Z79.4 TYPE 2 DIABETES MELLITUS WITH OTHER CIRCULATORY COMPLICATION, WITH LONG-TERM CURRENT USE OF INSULIN (HCC): ICD-10-CM

## 2024-10-14 DIAGNOSIS — E11.59 TYPE 2 DIABETES MELLITUS WITH OTHER CIRCULATORY COMPLICATION, WITH LONG-TERM CURRENT USE OF INSULIN (HCC): ICD-10-CM

## 2024-10-14 PROCEDURE — 99495 TRANSJ CARE MGMT MOD F2F 14D: CPT | Performed by: FAMILY MEDICINE

## 2024-10-14 NOTE — PROGRESS NOTES
Transition of Care Visit  Name: Aaron Richards      : 1965      MRN: [id number]  Encounter Provider: Sujata Garvey DO  Encounter Date: 10/14/2024   Encounter department: [facility] [address]    Assessment & Plan  Hypertension, essential  BP stable.       Hyperlipidemia, unspecified hyperlipidemia type         PAD (peripheral artery disease) ([facility])         Type 2 diabetes mellitus with other circulatory complication, with long-term current use of insulin ([facility])  Foot exam completed today.   Lab Results   Component Value Date    HGBA1C 6.0 (H) 10/04/2024            Colitis  GI symptoms have improved, completed Cipro/flagyl. Loose stools on doxycyline due to knee, will continue to monitor.             History of Present Illness     Transitional Care Management Review:   Aaron Richards is a 59 y.o. male here for TCM follow up.     During the TCM phone call patient stated:  TCM Call       Date and time call was made  10/7/2024  9:21 AM    Hospital care reviewed  Records reviewed    Patient was hospitialized at  [facility]    Date of Admission  10/04/24    Date of discharge  10/05/24    Diagnosis  Colitis    Disposition  Home    Were the patients medications reviewed and updated  Yes    Current Symptoms  None          TCM Call       Post hospital issues  None    Should patient be enrolled in anticoag monitoring?  No    Scheduled for follow up?  Patient refused    Patient refusal reason  pt refused.    Did you obtain your prescribed medications  Yes    Do you need help managing your prescriptions or medications  No    Is transportation to your appointment needed  No    I have advised the patient to call PCP with any new or worsening symptoms  Omayra Jean MA    Living Arrangements  Family members    Support System  Family    The type of support provided  Emotional    Do you have social support  Yes, as much as I need    Are you recieving any outpatient services   "No    Are you recieving home care services  No    Are you using any community resources  No    Current waiver services  No    Have you fallen in the last 12 months  No    Interperter language line needed  No    Counseling  Patient    Counseling topics  Activities of daily living; patient and family education    Comments  spoke with pt on 8/14/20 after his hospital d/c on 8/12/20. Right great toe was amputated by podiatry.  Infectious disease was consulted for antibiotic management, pt reports that he is doing well at home. he is taking all his medications and antibiotics as prescribed. pt will see PCP on 8/20/20 for a hospital f/u. he is aware to call our office if they have any questions or concern. ER/CMA.          Diabetes      Patient presents to the office for TCM.   Notes that he had a knee replacement, a few days later he was throwing up and had diarrhea. States that this lasted fro too long and he went to the hospital, was admitted for colitis. Was treated with ABX, completed ABX orally at home.     Notes that he has been feeling better. Stools are loose with no diarrhea. Has has some acid reflux. Denies any vomiting. Denies fever or chills. Has been eating regular diet without any issues. Denies abdominal pain. He is not taking Zofran.     Looking for a refill on his Ambien.     Has ID follow up on 10/22/24    Review of Systems  Objective     /86   Pulse 78   Temp 97.9 °F (36.6 °C)   Ht 6' 4\" (1.93 m)   Wt 102 kg (225 lb)   SpO2 98%   BMI 27.39 kg/m²     Physical Exam  Vitals reviewed.   Constitutional:       General: He is not in acute distress.     Appearance: Normal appearance.   HENT:      Head: Normocephalic and atraumatic.      Right Ear: External ear normal.      Left Ear: External ear normal.      Nose: Nose normal.      Mouth/Throat:      Mouth: Mucous membranes are moist.   Eyes:      Extraocular Movements: Extraocular movements intact.      Conjunctiva/sclera: Conjunctivae normal. "   Cardiovascular:      Rate and Rhythm: Normal rate and regular rhythm.      Pulses: Pulses are weak.           Dorsalis pedis pulses are 0 on the right side and 0 on the left side.        Posterior tibial pulses are 0 on the right side and 0 on the left side.   Pulmonary:      Effort: Pulmonary effort is normal.      Breath sounds: Normal breath sounds. No wheezing, rhonchi or rales.   Abdominal:      General: Bowel sounds are normal. There is no distension.      Palpations: Abdomen is soft.      Tenderness: There is no abdominal tenderness.   Musculoskeletal:      Right lower leg: No edema.      Left lower leg: No edema.      Comments: S/p right TKA, sutures in place.    Feet:      Right foot: amputated     Left foot:      Skin integrity: No ulcer, skin breakdown, erythema, warmth, callus or dry skin.   Skin:     General: Skin is warm.      Capillary Refill: Capillary refill takes less than 2 seconds.      Findings: No rash.   Neurological:      Mental Status: He is alert. Mental status is at baseline.       Diabetic Foot Exam    Patient's shoes and socks removed.    Right Foot/Ankle   Right Foot Inspection  Amputation: amputation right foot     Sensory   Vibration: absent  Proprioception: absent  Monofilament testing: absent    Vascular  Capillary refills: elevated  The right DP pulse is 0. The right PT pulse is 0.     Left Foot/Ankle  Left Foot Inspection  Skin Exam: skin normal and skin intact. No dry skin, no warmth, no erythema, no maceration, normal color, no pre-ulcer, no ulcer and no callus.     Toe Exam: ROM and strength within normal limits.     Sensory   Vibration: absent  Proprioception: absent  Monofilament testing: absent    Vascular  Capillary refills: elevated  The left DP pulse is 0. The left PT pulse is 0.     Assign Risk Category  No deformity present  Loss of protective sensation  Weak pulses  Risk: 2    Medications have been reviewed by provider in current encounter        Sujata Garvey  DO [facility]  10/14/2024 1:59 PM

## 2024-10-14 NOTE — ASSESSMENT & PLAN NOTE
GI symptoms have improved, completed Cipro/flagyl. Loose stools on doxycyline due to knee, will continue to monitor.

## 2024-10-19 DIAGNOSIS — M97.11XD PERIPROSTHETIC FRACTURE AROUND INTERNAL PROSTHETIC RIGHT KNEE JOINT, SUBSEQUENT ENCOUNTER: ICD-10-CM

## 2024-10-19 DIAGNOSIS — Z96.651 AFTERCARE FOLLOWING RIGHT KNEE JOINT REPLACEMENT SURGERY: ICD-10-CM

## 2024-10-19 DIAGNOSIS — Z47.1 AFTERCARE FOLLOWING RIGHT KNEE JOINT REPLACEMENT SURGERY: ICD-10-CM

## 2024-10-21 LAB
FUNGUS SPEC CULT: NORMAL

## 2024-10-21 RX ORDER — ASPIRIN 81 MG/1
81 TABLET, COATED ORAL 2 TIMES DAILY
Qty: 60 TABLET | Refills: 5 | Status: SHIPPED | OUTPATIENT
Start: 2024-10-21

## 2024-10-21 RX ORDER — CELECOXIB 200 MG/1
200 CAPSULE ORAL 2 TIMES DAILY
Qty: 60 CAPSULE | Refills: 5 | Status: SHIPPED | OUTPATIENT
Start: 2024-10-21

## 2024-10-22 ENCOUNTER — TELEMEDICINE (OUTPATIENT)
Dept: INFECTIOUS DISEASES | Facility: CLINIC | Age: 59
End: 2024-10-22
Payer: MEDICARE

## 2024-10-22 DIAGNOSIS — E11.9 TYPE 2 DIABETES MELLITUS WITHOUT COMPLICATION, WITH LONG-TERM CURRENT USE OF INSULIN (HCC): ICD-10-CM

## 2024-10-22 DIAGNOSIS — M00.9 PYOGENIC ARTHRITIS OF RIGHT KNEE JOINT, DUE TO UNSPECIFIED ORGANISM (HCC): Primary | ICD-10-CM

## 2024-10-22 DIAGNOSIS — Z79.4 TYPE 2 DIABETES MELLITUS WITHOUT COMPLICATION, WITH LONG-TERM CURRENT USE OF INSULIN (HCC): ICD-10-CM

## 2024-10-22 PROCEDURE — 99213 OFFICE O/P EST LOW 20 MIN: CPT | Performed by: PHYSICIAN ASSISTANT

## 2024-10-22 NOTE — ASSESSMENT & PLAN NOTE
Right knee septic arthritis. In the setting of a previous traumatic injury with hardware failure requiring hardware removal and now has the development of a sinus tract extending into the joint space with operative findings consistent with a septic arthritis. Although previous cultures have been negative they were obtained while he has been on antibiotics. He is now status post I&D, and placement of a spacer on 7/15/2024. Patient discharged to complete a 6 week course of IV daptomycin.  Cultures were held an additional 14 days and remain negative to date.      Confirmed with Dr Hendrix the plan for the spacer is to remain in place as long an possible.  Therefore will plan to transition patient to oral doxycycline for an additional 4.5 months after he completes his IV daptomycin.      9/24/24 - patient remains on po doxycycline but is now s/p Right Revision Total Knee Arthroplasty on 9/19/24 following fall on the knee. He suffered a fracture of proximal tibia with posterior dislocation and loosening of his tibial component.  Intra operative cultures remain negative to date.  Discussed with Dr Stearns and may be able to keep antibiotic shorter in light of recent surgery with new TKR and go 6 weeks from surgery vs the full 4.5 months from the initial surgery.      10/22/24 - Patient now off doxycycline.  He self discontinued the antibiotic last week due to GI distress.  I discussed with him our recommendation was at least 6 weeks post his second surgery on 9/19.  I offered him the following options, #1 restart doxycycline and complete the 6 weeks, #2 place him on an alternative antibiotic to complete the 6 weeks.  Patient does not want to be on any antibiotic and refuses all options provided.  At this time he states he does not believe he has an infection and will discuss with Dr Hendrix next week.  If Dr Hendrix thinks he should be on an antibiotic he will call us back.  Patient states his understanding of the risk of  infection by not completing the recommended course of antibiotics.

## 2024-10-22 NOTE — PROGRESS NOTES
Ambulatory Visit  Name: Aaron Richards      : 1965      MRN: [id number]  Encounter Provider: Osiel Hines PA-C  Encounter Date: 10/22/2024   Encounter department: [facility]    Assessment & Plan  Pyogenic arthritis of right knee joint, due to unspecified organism (HCC)  Right knee septic arthritis. In the setting of a previous traumatic injury with hardware failure requiring hardware removal and now has the development of a sinus tract extending into the joint space with operative findings consistent with a septic arthritis. Although previous cultures have been negative they were obtained while he has been on antibiotics. He is now status post I&D, and placement of a spacer on 7/15/2024. Patient discharged to complete a 6 week course of IV daptomycin.  Cultures were held an additional 14 days and remain negative to date.      Confirmed with Dr Hendrix the plan for the spacer is to remain in place as long an possible.  Therefore will plan to transition patient to oral doxycycline for an additional 4.5 months after he completes his IV daptomycin.      24 - patient remains on po doxycycline but is now s/p Right Revision Total Knee Arthroplasty on 24 following fall on the knee. He suffered a fracture of proximal tibia with posterior dislocation and loosening of his tibial component.  Intra operative cultures remain negative to date.  Discussed with Dr Stearns and may be able to keep antibiotic shorter in light of recent surgery with new TKR and go 6 weeks from surgery vs the full 4.5 months from the initial surgery.      10/22/24 - Patient now off doxycycline.  He self discontinued the antibiotic last week due to GI distress.  I discussed with him our recommendation was at least 6 weeks post his second surgery on .  I offered him the following options, #1 restart doxycycline and complete the 6 weeks, #2 place him on an alternative antibiotic to complete the 6 weeks.   Patient does not want to be on any antibiotic and refuses all options provided.  At this time he states he does not believe he has an infection and will discuss with Dr Hendrix next week.  If Dr Hendrix thinks he should be on an antibiotic he will call us back.  Patient states his understanding of the risk of infection by not completing the recommended course of antibiotics.                  Type 2 diabetes mellitus without complication, with long-term current use of insulin ([facility])    Lab Results   Component Value Date    HGBA1C 6.0 (H) 10/04/2024              History of Present Illness     Aaron Richards is a 59 y.o. male who presents for virtual follow up today regarding Right knee septic arthritis.  Patient now off antibiotics as he self discontinues his doxycycline one week ago.  He states it was causing his GI distress and he no longer wanted to take it.  He reports since stopping the doxycycline he GI symptoms have resolved.  His knee feels good.  He denies any fever or chills.       Review of Systems   Constitutional:  Negative for chills and fever.   Respiratory:  Negative for cough and shortness of breath.    Gastrointestinal:  Negative for abdominal pain, diarrhea, nausea and vomiting.   Skin:  Negative for rash.   Psychiatric/Behavioral:  Negative for behavioral problems and confusion.            Objective     There were no vitals taken for this visit.    Physical Exam  Vitals reviewed.   Constitutional:       General: He is not in acute distress.     Appearance: Normal appearance. He is not ill-appearing, toxic-appearing or diaphoretic.   HENT:      Head: Normocephalic and atraumatic.   Eyes:      General: No scleral icterus.        Right eye: No discharge.         Left eye: No discharge.      Conjunctiva/sclera: Conjunctivae normal.   Pulmonary:      Effort: Pulmonary effort is normal. No respiratory distress.   Musculoskeletal:      Comments: R knee incision without erythema.  Sutures remain in place.    Skin:     Coloration: Skin is not jaundiced or pale.      Findings: No erythema or rash.   Neurological:      Mental Status: He is alert and oriented to person, place, and time.   Psychiatric:         Mood and Affect: Mood normal.         Behavior: Behavior normal.           No new labs    Telemedicine consent    Patient: Aaron Richards  Provider: Osiel Hines PA-C  Provider located at [facility]  [address] [facility] [address] [phone]    The patient was identified by name and date of birth. Aaron Richards was informed that this is a telemedicine visit and that the visit is being conducted through the Epic Embedded platform. He agrees to proceed..  My office door was closed. No one else was in the room.  He acknowledged consent and understanding of privacy and security of the video platform. The patient has agreed to participate and understands they can discontinue the visit at any time.    Patient is aware this is a billable service.     I spent 10 minutes with the patient during this visit.   Additional time spent on chart/lab review, documentation and order placement.

## 2024-10-22 NOTE — ASSESSMENT & PLAN NOTE
Patient presented emergency department with lower chest/upper abdominal pain.  Patient has history of EtOH abuse and last drank vodka as of 0700 hrs. 10/21.  Patient underwent workup in the emergency department due to his pain.    CT: 5 mm distal left ureteral stone with left hydronephrosis  WBC: 8.80  Creat: 1.5, trended down from 1.57 with a baseline of 1.3  Urine without pyuria or bacteria  Afebrile, vital signs stable    Initial plan was for transfer to [facility] for cystoscopy, ureteroscopy, laser lithotripsy, retrograde pyelogram insertion of ureteral stent however [facility] at capacity currently and surgery will not be performed today.  Patient is stable per review of the records.  Per Dr. Rivera if patient remains afebrile and stable and his pain is under control he can be discharged home for medical expulsive therapy.  If patient wishes to proceed with surgical intervention he can have nonurgent transfer to [facility] when a bed becomes available.    Plan:  Strain all urine  For any significant pain, fevers or chills recommend proceeding to [facility] emergency department  Flomax 0.4 mg daily  Secure chat message sent to provider at [facility].  Await final disposition     · Unclear etiology; noted to be 129 yesterday  · Multifactorial in setting of IVF/Hctz and pain  · Nephrology following, appreciate input  · Sodium level improving today to 132

## 2024-10-29 ENCOUNTER — APPOINTMENT (OUTPATIENT)
Age: 59
End: 2024-10-29
Payer: MEDICARE

## 2024-10-29 ENCOUNTER — OFFICE VISIT (OUTPATIENT)
Age: 59
End: 2024-10-29

## 2024-10-29 VITALS — HEIGHT: 76 IN | WEIGHT: 225 LBS | BODY MASS INDEX: 27.4 KG/M2

## 2024-10-29 DIAGNOSIS — Z96.651 STATUS POST REVISION OF TOTAL KNEE REPLACEMENT, RIGHT: Primary | ICD-10-CM

## 2024-10-29 DIAGNOSIS — Z96.651 STATUS POST REVISION OF TOTAL KNEE REPLACEMENT, RIGHT: ICD-10-CM

## 2024-10-29 PROCEDURE — 99024 POSTOP FOLLOW-UP VISIT: CPT | Performed by: STUDENT IN AN ORGANIZED HEALTH CARE EDUCATION/TRAINING PROGRAM

## 2024-10-29 PROCEDURE — 73562 X-RAY EXAM OF KNEE 3: CPT

## 2024-10-29 NOTE — PROGRESS NOTES
Subjective:Patient seen and examined. Pain controlled, he only took Advil post operatively. Progressing well, he has been ambulating with the assistance of a walker at home. Incision without drainage, sutures intact. Denies fevers or chills.  He states that post operatively he had a lot of GI issues which required a visit to the ER and why he missed his first post-op appt. He has stopped his doxycycline that he was on from ID.  He has no increased pain since discontinuing his antibiotics.     Physical Exam:  Incision: CDI, no erythema or drainage.  Cultures removed today.  ROM: 0-120 without pain  5/5 IP/Q/HS/TA/GS, 2+ DP/PT, SILT DP/SP/S/S/TN    XR Right knee: SROM hinged TKA, medially augmented proximal tibia with built-up baseplate tibial component. No fracture or dislocation.     Assessment/Plan:  58 y/o male almost 6 weeks s/p Right TKA Revision from 9/19/24.    - continue multi-modal pain control   - Weight bearing status: WBAT RLE  - DVT ppx: Complete  - Incision care: May shower, no restrictions  - PT/OT, outpatient PT script placed  - patient has chosen to stop abx, cultures have been negative. He is aware of the consequences of recurrence of infection.   - F/U 6 weeks      Scribe Attestation      I,:  Dionne Kuhn PA-C am acting as a scribe while in the presence of the attending physician.:       I,:  Jorge Hendrix DO personally performed the services described in this documentation    as scribed in my presence.:

## 2024-11-07 ENCOUNTER — TELEPHONE (OUTPATIENT)
Age: 59
End: 2024-11-07

## 2024-11-07 DIAGNOSIS — Z96.651 STATUS POST REVISION OF TOTAL KNEE REPLACEMENT, RIGHT: Primary | ICD-10-CM

## 2024-11-07 RX ORDER — AMOXICILLIN 500 MG/1
CAPSULE ORAL
Qty: 4 CAPSULE | Refills: 5 | Status: SHIPPED | OUTPATIENT
Start: 2024-11-07 | End: 2024-12-07

## 2024-11-07 NOTE — TELEPHONE ENCOUNTER
Caller: Patient    Doctor: Simeon    Reason for call: Patient has a dental procedure 11/14 @1pm. Please send abx to [facility]    Call back#: [phone]

## 2024-11-11 ENCOUNTER — EVALUATION (OUTPATIENT)
Dept: PHYSICAL THERAPY | Facility: CLINIC | Age: 59
End: 2024-11-11
Payer: MEDICARE

## 2024-11-11 DIAGNOSIS — Z96.651 STATUS POST REVISION OF TOTAL KNEE REPLACEMENT, RIGHT: ICD-10-CM

## 2024-11-11 DIAGNOSIS — R53.81 PHYSICAL DECONDITIONING: Primary | ICD-10-CM

## 2024-11-11 PROCEDURE — 97161 PT EVAL LOW COMPLEX 20 MIN: CPT

## 2024-11-11 PROCEDURE — 97110 THERAPEUTIC EXERCISES: CPT

## 2024-11-11 NOTE — PROGRESS NOTES
PT Evaluation     Today's date: 2024  Patient name: Aaron Richards  : 1965  MRN: [id number]  Referring provider: Dionne Kuhn PA-C  Dx:   Encounter Diagnosis     ICD-10-CM    1. Physical deconditioning  R53.81       2. Status post revision of total knee replacement, right  Z96.651 Ambulatory Referral to Physical Therapy          Start Time: 1200  Stop Time: 1250  Total time in clinic (min): 50 minutes    Assessment  Impairments: abnormal gait, abnormal or restricted ROM, activity intolerance, impaired balance, impaired physical strength, lacks appropriate home exercise program, pain with function, participation limitations, activity limitations and endurance  Symptom irritability: moderate    Assessment details: Patient is a pleasant 60 y/o male presenting to outpatient physical therapy with physical deconditioning, status post R TKA revision 24.  Upon examination, considerable limitations noted in active and passive ROM for bilateral ankle dorsiflexion and plantarflexion, more significant limitations present at right side.  Limitations noted in right knee flexion and bilateral knee extension as well.  Manual muscle testing reveals considerable weakness at bilateral hip flexor musculature.  Functional tests and measures reveal 5xSTS score of 16.65  seconds with UE assist, and TUG score of 24.01 second w/ usage of rolling walker.  Results of aforementioned tests and measures suggest that pt is limited in functional strength, endurance, and is at increased risk for falls as compared to his peers.  Patient will benefit from skilled physical therapy, including therapeutic exercise, stretching, manual therapy, and modalities prn to improve their level of function, to increase overall quality of life, and to address his impairments.  Educated pt on physiological responses to prolonged inactivity, as well as musculoskeletal and cardiovascular responses to exercise.  Educated pt on initiation of  physical therapy services and continued activity at home in order to improve endurance while minimizing risk for falls and re-injury, pt verbalized understanding and is agreeable to POC.    Dispensed home exercise program and educated patient on proper technique, frequency, and the possibility of DOMS for the next 24 to 48 hours. Patient demonstrated understanding and was advised to call us if he has any further questions.   Understanding of Dx/Px/POC: good     Prognosis: good    Goals  STG to be achieved by 4 weeks  -Pt will be independent with basic HEP  -Pt will improve MMT scores by 1/2 grade in all deficient planes in order to facilitate ease of functional activity  -Pt will improve ROM by 25% in all deficient planes in order to improve functional mobility  -Pt will improve 5xSTS score by 2 seconds in order to demonstrate increased functional strength.  -Pt will improve TUG score by 2 seconds in order to demonstrate improved functional mobility and decreased risk of falls.    LTG to be achieved by Discharge  -Pt will be independent with comprehensive HEP  -Pt will improve MMT scores to WFL in all deficient planes in order to facilitate ease of functional activity  -Pt will improve ROM to WFL in all deficient planes in order to improve functional mobility  -Pt will report 0/10 at worst in order to demonstrate return to PLOF  -Pt will report no pain with usual ADLs  -Pt will improve 5xSTS score by 4 seconds in order to demonstrate increased functional strength.  -Pt will improve TUG score by 4 seconds in order to demonstrate improved functional mobility and decreased risk of falls.         Plan  Patient would benefit from: skilled physical therapy  Referral necessary: No    Planned therapy interventions: flexibility, functional ROM exercises, graded activity, graded exercise, home exercise program, joint mobilization, manual therapy, neuromuscular re-education, patient/caregiver education, sensory integrative  techniques, strengthening, stretching, therapeutic activities, therapeutic exercise, balance and balance/weight bearing training    Frequency: 2x week  Duration in weeks: 12  Plan of Care beginning date: 2024  Plan of Care expiration date: 2/3/2025  Treatment plan discussed with: patient        Subjective Evaluation    History of Present Illness  Mechanism of injury: Aaron is a 59 y.o. male presenting to physical therapy on 24 with referral from MD for s/p R TKA revision 24.  Pt currently ambulating w/ front wheeled walker, states that since revision pt has not had significant amounts of pain.  Pt states he has been doing home exercises intermittently at home prior to this evaluation and they have been going well.  Pt states he is more concerned with his balance as he would like to eventually transition to ambulation with a SPC.  Pt does state that about 5 years ago he underwent a transmetatarsal amputation on the right foot in which he believes he underwent significant atrophy as a result.                 Not a recurrent problem   Quality of life: good    Patient Goals  Patient goals for therapy: improved balance, decreased pain and increased strength    Pain  Current pain ratin  At best pain ratin  At worst pain ratin  Quality: dull ache  Relieving factors: rest  Aggravating factors: standing and walking  Progression: improved    Social Support  Steps to enter house: yes  4  Lives in: one-story house  Lives with: spouse    Employment status: not working    Diagnostic Tests  X-ray: normal  Treatments  Previous treatment: physical therapy        Objective     Active Range of Motion   Left Hip   Normal active range of motion    Right Hip   Normal active range of motion  Left Knee   Prone flexion: 120 degrees   Extension: -5 degrees     Right Knee   Prone flexion: 111 degrees   Extension: -5 degrees   Left Ankle/Foot   Dorsiflexion (kf): 3 degrees   Plantar flexion: 16 degrees      Right Ankle/Foot   Dorsiflexion (kf): 0 degrees   Plantar flexion: 10 degrees     Passive Range of Motion   Left Knee   Prone flexion: 135 degrees     Right Knee   Prone flexion: 120 degrees     Right Ankle/Foot    Dorsiflexion (kf): 10 degrees    Plantar flexion: 18 degrees     Strength/Myotome Testing     Left Hip   Planes of Motion   Flexion: 4  Abduction: 4+  Adduction: 4+    Right Hip   Planes of Motion   Flexion: 4  Abduction: 4+  Adduction: 4+    Left Knee   Flexion: 5  Extension: 5    Right Knee   Flexion: 4+  Extension: 4+    Left Ankle/Foot   Dorsiflexion: 3-    Right Ankle/Foot   Dorsiflexion: 2+    Ambulation     Observational Gait   Right foot contact pattern: foot flat    Additional Observational Gait Details  Right foot drop    Functional tests and measures 2024:  5xSTS: 16.65 w/ UE assist  TU.01 w/ RW         Precautions: R Transmetatarsal amputation ~5 years, s/p R TKA revision 24    POC expires Unit limit Auth  expiration date PT/OT + Visit Limit?   2/3/25 N/A  DOMN      Sec - 60 visits per auth           Visit/Unit Tracking  AUTH Status:  Date               N/A Used 1               Remaining                     Manuals                                                                 Neuro Re-Ed             SLR             Standing 3 way hip             Lateral walks             Seated webslide: M, L rows             Evans YUMIKO             Tandem stance             TG squats                                       Ther Ex             Pt education Physiological response to exercise, HEP            Nustep for cardio             STS HEP            Supine hip flexor stretch w/ strap             Supine hamstring stretch w/ strap                                                    Ther Activity                                       Gait Training                                       Modalities

## 2024-11-12 ENCOUNTER — TELEMEDICINE (OUTPATIENT)
Dept: FAMILY MEDICINE CLINIC | Facility: CLINIC | Age: 59
End: 2024-11-12
Payer: MEDICARE

## 2024-11-12 DIAGNOSIS — Z00.00 ENCOUNTER FOR MEDICARE ANNUAL WELLNESS EXAM: Primary | ICD-10-CM

## 2024-11-12 DIAGNOSIS — Z12.11 COLON CANCER SCREENING: ICD-10-CM

## 2024-11-12 DIAGNOSIS — K63.5 POLYP OF COLON, UNSPECIFIED PART OF COLON, UNSPECIFIED TYPE: ICD-10-CM

## 2024-11-12 PROCEDURE — G0439 PPPS, SUBSEQ VISIT: HCPCS | Performed by: FAMILY MEDICINE

## 2024-11-12 NOTE — PROGRESS NOTES
Ambulatory Visit  Name: Aaron Richards      : 1965      MRN: [id number]  Encounter Provider: Sujata Garvey DO  Encounter Date: 2024   Encounter department: [address]    Assessment & Plan  Colon cancer screening    Orders:    Ambulatory Referral to Gastroenterology; Future    Polyp of colon, unspecified part of colon, unspecified type    Orders:    Ambulatory Referral to Gastroenterology; Future    Encounter for Medicare annual wellness exam            Preventive health issues were discussed with patient, and age appropriate screening tests were ordered as noted in patient's After Visit Summary. Personalized health advice and appropriate referrals for health education or preventive services given if needed, as noted in patient's After Visit Summary.    History of Present Illness     HPI   Patient Care Team:  Sujata Garvey DO as PCP - General (Family Medicine)  Alea Sanchez MD (Endocrinology)  Farheen Gonzalez PA-C as Physician Assistant (Physician Assistant)  Bucky Tucker MD (Nephrology)    Review of Systems  Medical History Reviewed by provider this encounter:  Tobacco  Allergies  Meds  Problems  Med Hx  Surg Hx  Fam Hx       Annual Wellness Visit Questionnaire   Aaron is here for his Subsequent Wellness visit.     Health Risk Assessment:   Patient rates overall health as good. Patient feels that their physical health rating is same. Patient is satisfied with their life. Eyesight was rated as same. Hearing was rated as same. Patient feels that their emotional and mental health rating is same. Patients states they are never, rarely angry. Patient states they are sometimes unusually tired/fatigued. Pain experienced in the last 7 days has been some. Patient's pain rating has been 1/10. Patient states that he has experienced no weight loss or gain in last 6 months.     Fall Risk Screening:   In the past year, patient has  experienced: no history of falling in past year      Home Safety:  Patient has trouble with stairs inside or outside of their home. Patient has working smoke alarms and has working carbon monoxide detector. Home safety hazards include: none.     Nutrition:   Current diet is Regular.     Medications:   Patient is not currently taking any over-the-counter supplements. Patient is able to manage medications.     Activities of Daily Living (ADLs)/Instrumental Activities of Daily Living (IADLs):   Walk and transfer into and out of bed and chair?: Yes  Dress and groom yourself?: Yes    Bathe or shower yourself?: Yes    Feed yourself? Yes  Do your laundry/housekeeping?: Yes  Manage your money, pay your bills and track your expenses?: Yes  Make your own meals?: Yes    Do your own shopping?: Yes    Previous Hospitalizations:   Any hospitalizations or ED visits within the last 12 months?: Yes    How many hospitalizations have you had in the last year?: 1-2    Advance Care Planning:   Living will: No    Durable POA for healthcare: No    Advanced directive: No    Advanced directive counseling given: Yes    ACP document given: Yes      Cognitive Screening:   Provider or family/friend/caregiver concerned regarding cognition?: No    PREVENTIVE SCREENINGS      Cardiovascular Screening:    General: Screening Not Indicated and History Lipid Disorder      Diabetes Screening:     General: Screening Not Indicated and History Diabetes      Colorectal Cancer Screening:     General: Screening Current      Prostate Cancer Screening:    General: Risks and Benefits Discussed      Osteoporosis Screening:    General: Screening Not Indicated      Abdominal Aortic Aneurysm (AAA) Screening:        General: Screening Not Indicated      Lung Cancer Screening:     General: Screening Not Indicated      Hepatitis C Screening:    General: Screening Current    Screening, Brief Intervention, and Referral to Treatment (SBIRT)    Screening  Typical number of  drinks in a day: 0  Typical number of drinks in a week: 0  Interpretation: Low risk drinking behavior.    Single Item Drug Screening:  How often have you used an illegal drug (including marijuana) or a prescription medication for non-medical reasons in the past year? never    Single Item Drug Screen Score: 0  Interpretation: Negative screen for possible drug use disorder    Review of Current Opioid Use    Opioid Risk Tool (ORT) Interpretation: Complete Opioid Risk Tool (ORT)    Other Counseling Topics:   Car/seat belt/driving safety and calcium and vitamin D intake and regular weightbearing exercise.     Social Determinants of Health     Financial Resource Strain: Low Risk  (9/18/2023)    Overall Financial Resource Strain (CARDIA)     Difficulty of Paying Living Expenses: Not hard at all   Food Insecurity: No Food Insecurity (11/12/2024)    Hunger Vital Sign     Worried About Running Out of Food in the Last Year: Never true     Ran Out of Food in the Last Year: Never true   Transportation Needs: No Transportation Needs (11/12/2024)    PRAPARE - Transportation     Lack of Transportation (Medical): No     Lack of Transportation (Non-Medical): No   Housing Stability: Low Risk  (11/12/2024)    Housing Stability Vital Sign     Unable to Pay for Housing in the Last Year: No     Number of Times Moved in the Last Year: 0     Homeless in the Last Year: No   Utilities: Not At Risk (11/12/2024)    [facility] Utilities     Threatened with loss of utilities: No     No results found.    Objective     There were no vitals taken for this visit.    Physical Exam  Vitals reviewed.   Constitutional:       General: He is not in acute distress.     Appearance: He is not ill-appearing.   HENT:      Head: Normocephalic and atraumatic.      Right Ear: External ear normal.      Left Ear: External ear normal.      Nose: Nose normal.      Mouth/Throat:      Mouth: Mucous membranes are moist.   Eyes:      Extraocular Movements: Extraocular movements  intact.      Conjunctiva/sclera: Conjunctivae normal.   Pulmonary:      Effort: Pulmonary effort is normal. No respiratory distress.      Breath sounds: No wheezing.   Neurological:      General: No focal deficit present.      Mental Status: He is alert and oriented to person, place, and time. Mental status is at baseline.           Virtual AWV Consent    Verification of patient location:    Patient is located at Home in the following state in which I hold an active license [place]    The patient was identified by name and date of birth. Aaron Richards was informed that this is a telemedicine visit and that the visit is being conducted through the Epic Embedded platform. He agrees to proceed..  My office door was closed. No one else was in the room.  He acknowledged consent and understanding of privacy and security of the video platform. The patient has agreed to participate and understands they can discontinue the visit at any time.    Patient is aware this is a billable service.     Reason for visit is AWV    Encounter provider Sujata Garvey DO    Provider located at [place] [address] [place] [address] [place] [address]  [phone]      Recent Visits  No visits were found meeting these conditions.  Showing recent visits within past 7 days and meeting all other requirements  Today's Visits  Date Type Provider Dept   11/12/24 Telemedicine Sujata Garvey DO [place] [address] [place] [address]   Showing today's visits and meeting all other requirements  Future Appointments  No visits were found meeting these conditions.  Showing future appointments within next 150 days and meeting all other requirements     Visit Time  Total Visit Duration: 12    Sujata Garvey DO  [facility]  11/12/2024 1:52 PM

## 2024-11-12 NOTE — PATIENT INSTRUCTIONS
Medicare Preventive Visit Patient Instructions  Thank you for completing your Welcome to Medicare Visit or Medicare Annual Wellness Visit today. Your next wellness visit will be due in one year (11/13/2025).  The screening/preventive services that you may require over the next 5-10 years are detailed below. Some tests may not apply to you based off risk factors and/or age. Screening tests ordered at today's visit but not completed yet may show as past due. Also, please note that scanned in results may not display below.  Preventive Screenings:  Service Recommendations Previous Testing/Comments   Colorectal Cancer Screening  Colonoscopy    Fecal Occult Blood Test (FOBT)/Fecal Immunochemical Test (FIT)  Fecal DNA/Cologuard Test  Flexible Sigmoidoscopy Age: 45-75 years old   Colonoscopy: every 10 years (May be performed more frequently if at higher risk)  OR  FOBT/FIT: every 1 year  OR  Cologuard: every 3 years  OR  Sigmoidoscopy: every 5 years  Screening may be recommended earlier than age 45 if at higher risk for colorectal cancer. Also, an individualized decision between you and your healthcare provider will decide whether screening between the ages of 76-85 would be appropriate. Colonoscopy: 01/10/2022  FOBT/FIT: Not on file  Cologuard: Not on file  Sigmoidoscopy: Not on file          Prostate Cancer Screening Individualized decision between patient and health care provider in men between ages of 55-69   Medicare will cover every 12 months beginning on the day after your 50th birthday PSA: 0.27 ng/mL           Hepatitis C Screening Once for adults born between 1945 and 1965  More frequently in patients at high risk for Hepatitis C Hep C Antibody: 01/22/2024        Diabetes Screening 1-2 times per year if you're at risk for diabetes or have pre-diabetes Fasting glucose: 125 mg/dL (7/31/2024)  A1C: 6.0 % (10/4/2024)      Cholesterol Screening Once every 5 years if you don't have a lipid disorder. May order more often  based on risk factors. Lipid panel: 07/01/2024         Other Preventive Screenings Covered by Medicare:  Abdominal Aortic Aneurysm (AAA) Screening: covered once if your at risk. You're considered to be at risk if you have a family history of AAA or a male between the age of 65-75 who smoking at least 100 cigarettes in your lifetime.  Lung Cancer Screening: covers low dose CT scan once per year if you meet all of the following conditions: (1) Age 55-77; (2) No signs or symptoms of lung cancer; (3) Current smoker or have quit smoking within the last 15 years; (4) You have a tobacco smoking history of at least 20 pack years (packs per day x number of years you smoked); (5) You get a written order from a healthcare provider.  Glaucoma Screening: covered annually if you're considered high risk: (1) You have diabetes OR (2) Family history of glaucoma OR (3)  aged 50 and older OR (4)  American aged 65 and older  Osteoporosis Screening: covered every 2 years if you meet one of the following conditions: (1) Have a vertebral abnormality; (2) On glucocorticoid therapy for more than 3 months; (3) Have primary hyperparathyroidism; (4) On osteoporosis medications and need to assess response to drug therapy.  HIV Screening: covered annually if you're between the age of 15-65. Also covered annually if you are younger than 15 and older than 65 with risk factors for HIV infection. For pregnant patients, it is covered up to 3 times per pregnancy.    Immunizations:  Immunization Recommendations   Influenza Vaccine Annual influenza vaccination during flu season is recommended for all persons aged >= 6 months who do not have contraindications   Pneumococcal Vaccine   * Pneumococcal conjugate vaccine = PCV13 (Prevnar 13), PCV15 (Vaxneuvance), PCV20 (Prevnar 20)  * Pneumococcal polysaccharide vaccine = PPSV23 (Pneumovax) Adults 19-65 yo with certain risk factors or if 65+ yo  If never received any pneumonia vaccine:  recommend Prevnar 20 (PCV20)  Give PCV20 if previously received 1 dose of PCV13 or PPSV23   Hepatitis B Vaccine 3 dose series if at intermediate or high risk (ex: diabetes, end stage renal disease, liver disease)   Respiratory syncytial virus (RSV) Vaccine - COVERED BY MEDICARE PART D  * RSVPreF3 (Arexvy) CDC recommends that adults 60 years of age and older may receive a single dose of RSV vaccine using shared clinical decision-making (SCDM)   Tetanus (Td) Vaccine - COST NOT COVERED BY MEDICARE PART B Following completion of primary series, a booster dose should be given every 10 years to maintain immunity against tetanus. Td may also be given as tetanus wound prophylaxis.   Tdap Vaccine - COST NOT COVERED BY MEDICARE PART B Recommended at least once for all adults. For pregnant patients, recommended with each pregnancy.   Shingles Vaccine (Shingrix) - COST NOT COVERED BY MEDICARE PART B  2 shot series recommended in those 19 years and older who have or will have weakened immune systems or those 50 years and older     Health Maintenance Due:      Topic Date Due   • Colorectal Cancer Screening  01/09/2025   • HIV Screening  Completed   • Hepatitis C Screening  Completed     Immunizations Due:      Topic Date Due   • Pneumococcal Vaccine: Pediatrics (0 to 5 Years) and At-Risk Patients (6 to 64 Years) (2 of 2 - PCV) 01/01/2007   • COVID-19 Vaccine (5 - 2023-24 season) 09/01/2024     Advance Directives   What are advance directives?  Advance directives are legal documents that state your wishes and plans for medical care. These plans are made ahead of time in case you lose your ability to make decisions for yourself. Advance directives can apply to any medical decision, such as the treatments you want, and if you want to donate organs.   What are the types of advance directives?  There are many types of advance directives, and each state has rules about how to use them. You may choose a combination of any of the  following:  Living will:  This is a written record of the treatment you want. You can also choose which treatments you do not want, which to limit, and which to stop at a certain time. This includes surgery, medicine, IV fluid, and tube feedings.   Durable power of  for healthcare (DPAHC):  This is a written record that states who you want to make healthcare choices for you when you are unable to make them for yourself. This person, called a proxy, is usually a family member or a friend. You may choose more than 1 proxy.  Do not resuscitate (DNR) order:  A DNR order is used in case your heart stops beating or you stop breathing. It is a request not to have certain forms of treatment, such as CPR. A DNR order may be included in other types of advance directives.  Medical directive:  This covers the care that you want if you are in a coma, near death, or unable to make decisions for yourself. You can list the treatments you want for each condition. Treatment may include pain medicine, surgery, blood transfusions, dialysis, IV or tube feedings, and a ventilator (breathing machine).  Values history:  This document has questions about your views, beliefs, and how you feel and think about life. This information can help others choose the care that you would choose.  Why are advance directives important?  An advance directive helps you control your care. Although spoken wishes may be used, it is better to have your wishes written down. Spoken wishes can be misunderstood, or not followed. Treatments may be given even if you do not want them. An advance directive may make it easier for your family to make difficult choices about your care.   Weight Management   Why it is important to manage your weight:  Being overweight increases your risk of health conditions such as heart disease, high blood pressure, type 2 diabetes, and certain types of cancer. It can also increase your risk for osteoarthritis, sleep apnea, and  other respiratory problems. Aim for a slow, steady weight loss. Even a small amount of weight loss can lower your risk of health problems.  How to lose weight safely:  A safe and healthy way to lose weight is to eat fewer calories and get regular exercise. You can lose up about 1 pound a week by decreasing the number of calories you eat by 500 calories each day.   Healthy meal plan for weight management:  A healthy meal plan includes a variety of foods, contains fewer calories, and helps you stay healthy. A healthy meal plan includes the following:  Eat whole-grain foods more often.  A healthy meal plan should contain fiber. Fiber is the part of grains, fruits, and vegetables that is not broken down by your body. Whole-grain foods are healthy and provide extra fiber in your diet. Some examples of whole-grain foods are whole-wheat breads and pastas, oatmeal, brown rice, and bulgur.  Eat a variety of vegetables every day.  Include dark, leafy greens such as spinach, kale, dionna greens, and mustard greens. Eat yellow and orange vegetables such as carrots, sweet potatoes, and winter squash.   Eat a variety of fruits every day.  Choose fresh or canned fruit (canned in its own juice or light syrup) instead of juice. Fruit juice has very little or no fiber.  Eat low-fat dairy foods.  Drink fat-free (skim) milk or 1% milk. Eat fat-free yogurt and low-fat cottage cheese. Try low-fat cheeses such as mozzarella and other reduced-fat cheeses.  Choose meat and other protein foods that are low in fat.  Choose beans or other legumes such as split peas or lentils. Choose fish, skinless poultry (chicken or turkey), or lean cuts of red meat (beef or pork). Before you cook meat or poultry, cut off any visible fat.   Use less fat and oil.  Try baking foods instead of frying them. Add less fat, such as margarine, sour cream, regular salad dressing and mayonnaise to foods. Eat fewer high-fat foods. Some examples of high-fat foods  include french fries, doughnuts, ice cream, and cakes.  Eat fewer sweets.  Limit foods and drinks that are high in sugar. This includes candy, cookies, regular soda, and sweetened drinks.  Exercise:  Exercise at least 30 minutes per day on most days of the week. Some examples of exercise include walking, biking, dancing, and swimming. You can also fit in more physical activity by taking the stairs instead of the elevator or parking farther away from stores. Ask your healthcare provider about the best exercise plan for you.      © Copyright [employer] 2018 Information is for End User's use only and may not be sold, redistributed or otherwise used for commercial purposes. All illustrations and images included in CareNotes® are the copyrighted property of [employer] Inc[employer] or [employer]

## 2024-11-14 ENCOUNTER — OFFICE VISIT (OUTPATIENT)
Dept: PHYSICAL THERAPY | Facility: CLINIC | Age: 59
End: 2024-11-14
Payer: MEDICARE

## 2024-11-14 DIAGNOSIS — R53.81 PHYSICAL DECONDITIONING: ICD-10-CM

## 2024-11-14 DIAGNOSIS — Z96.651 STATUS POST REVISION OF TOTAL KNEE REPLACEMENT, RIGHT: Primary | ICD-10-CM

## 2024-11-14 PROCEDURE — 97110 THERAPEUTIC EXERCISES: CPT

## 2024-11-14 PROCEDURE — 97530 THERAPEUTIC ACTIVITIES: CPT

## 2024-11-14 NOTE — PROGRESS NOTES
Daily Note     Today's date: 2024  Patient name: Aaron Richards  : 1965  MRN: [id number]  Referring provider: Dionne Kuhn PA-C  Dx:   Encounter Diagnosis     ICD-10-CM    1. Status post revision of total knee replacement, right  Z96.651       2. Physical deconditioning  R53.81                      Subjective: Pt reports home exercises have been going well however he has increased soreness after doing the.  Pt requests a lighter workout today as he will be taking care of several errands after today's appointment.      Objective: See treatment diary below      Assessment: Tolerated treatment well. Continued with functional strengthening today, pt does require frequent rest breaks for fatigue, pt will continue to progress as tolerated in order to improve cardiovascular endurance and  overall functional mobility.  Patient would benefit from continued PT      Plan: Continue per plan of care.      Precautions: R Transmetatarsal amputation ~5 years, s/p R TKA revision 24    POC expires Unit limit Auth  expiration date PT/OT + Visit Limit?   2/3/25 N/A  DOMN      Sec - 60 visits per auth           Visit/Unit Tracking  AUTH Status:  Date              N/A Used 1 2              Remaining                     Manuals                                                                Neuro Re-Ed             SLR             Standing 3 way hip             Lateral walks             Seated webslide: M, L rows             [place] YUM[employer]KO             Tandem stance             TG squats                                       Ther Ex             Pt education Physiological response to exercise, HEP            Nustep for cardio  L1 10'           STS HEP 2x10 w/ UE assist and foam pad           Supine hip flexor stretch w/ strap  3x30''  B/L           Supine hamstring stretch w/ strap             Seated hamstring stretch w/ strap  3x30'' B/L                                     Ther Activity              Alt step taps  2x10 4''                        Gait Training                                       Modalities

## 2024-11-18 ENCOUNTER — APPOINTMENT (OUTPATIENT)
Dept: PHYSICAL THERAPY | Facility: CLINIC | Age: 59
End: 2024-11-18
Payer: MEDICARE

## 2024-11-20 ENCOUNTER — APPOINTMENT (OUTPATIENT)
Dept: PHYSICAL THERAPY | Facility: CLINIC | Age: 59
End: 2024-11-20
Payer: MEDICARE

## 2024-11-21 ENCOUNTER — OFFICE VISIT (OUTPATIENT)
Dept: PHYSICAL THERAPY | Facility: CLINIC | Age: 59
End: 2024-11-21
Payer: MEDICARE

## 2024-11-21 DIAGNOSIS — Z96.651 STATUS POST REVISION OF TOTAL KNEE REPLACEMENT, RIGHT: Primary | ICD-10-CM

## 2024-11-21 DIAGNOSIS — R53.81 PHYSICAL DECONDITIONING: ICD-10-CM

## 2024-11-21 PROCEDURE — 97112 NEUROMUSCULAR REEDUCATION: CPT

## 2024-11-21 PROCEDURE — 97110 THERAPEUTIC EXERCISES: CPT

## 2024-11-21 NOTE — PROGRESS NOTES
Daily Note     Today's date: 2024  Patient name: Aaron Richards  : 1965  MRN: [id number]  Referring provider: Dionne Kuhn PA-C  Dx:   Encounter Diagnosis     ICD-10-CM    1. Status post revision of total knee replacement, right  Z96.651       2. Physical deconditioning  R53.81                      Subjective: Pt states that knee feels very stiff today, states did have some fatigue after last session but was not significant limited in functional activity for the remainder of the day.      Objective: See treatment diary below      Assessment: Tolerated treatment well. Continued with functional strengthening exercises today, pt required several rest breaks today and requested to terminate session early today d/t fatigue.  PT will continue to assess and implement interventions focused on improving functional strength and endurance per pt tolerance.  Patient would benefit from continued PT      Plan: Continue per plan of care.      Precautions: R Transmetatarsal amputation ~5 years, s/p R TKA revision 24, DM type 2    POC expires Unit limit Auth  expiration date PT/OT + Visit Limit?   2/3/25 N/A  DOMN      Sec - 60 visits per auth           Visit/Unit Tracking  AUTH Status:  Date             N/A Used 1 2 3             Remaining                     Manuals                                                               Neuro Re-Ed             SLR             Standing 3 way hip   x10 B/L ABD/EXT          Seated hip ABD   2x20 GTB          Lateral walks   NT          Seated marches   2x10 B/L          Seated webslide: M, L rows   2x10 M, L          [place] YUMIKO             Tandem stance             TG squats                                       Ther Ex             Pt education Physiological response to exercise, HEP            Nustep for cardio  L1 10' NV          STS HEP 2x10 w/ UE assist and foam pad 2x10 w/ UE assist and foam pad          Supine hip flexor  stretch w/ strap  3x30''  B/L           Supine hamstring stretch w/ strap             Seated hamstring stretch w/ strap  3x30'' B/L                                     Ther Activity             Alt step taps  2x10 4''                        Gait Training                                       Modalities

## 2024-11-22 ENCOUNTER — APPOINTMENT (OUTPATIENT)
Dept: PHYSICAL THERAPY | Facility: CLINIC | Age: 59
End: 2024-11-22
Payer: MEDICARE

## 2024-11-23 DIAGNOSIS — F32.A DEPRESSION, UNSPECIFIED DEPRESSION TYPE: ICD-10-CM

## 2024-11-25 ENCOUNTER — OFFICE VISIT (OUTPATIENT)
Dept: PHYSICAL THERAPY | Facility: CLINIC | Age: 59
End: 2024-11-25
Payer: MEDICARE

## 2024-11-25 DIAGNOSIS — R53.81 PHYSICAL DECONDITIONING: ICD-10-CM

## 2024-11-25 DIAGNOSIS — Z96.651 STATUS POST REVISION OF TOTAL KNEE REPLACEMENT, RIGHT: Primary | ICD-10-CM

## 2024-11-25 PROCEDURE — 97112 NEUROMUSCULAR REEDUCATION: CPT

## 2024-11-25 PROCEDURE — 97110 THERAPEUTIC EXERCISES: CPT

## 2024-11-25 RX ORDER — ESCITALOPRAM OXALATE 5 MG/1
5 TABLET ORAL DAILY
Qty: 30 TABLET | Refills: 0 | Status: SHIPPED | OUTPATIENT
Start: 2024-11-25

## 2024-11-25 NOTE — PROGRESS NOTES
Daily Note     Today's date: 2024  Patient name: Aaron Richards  : 1965  MRN: [id number]  Referring provider: Dionne Kunh PA-C  Dx:   Encounter Diagnosis     ICD-10-CM    1. Status post revision of total knee replacement, right  Z96.651       2. Physical deconditioning  R53.81                      Subjective: Pt states his knee feels stiff today.      Objective: See treatment diary below      Assessment: Tolerated treatment well. Continued with functional strengthening with transition to table based exercises for purposes of energy conservation during resistance training.  Pt able to tolerate and complete interventions today with frequent rest breaks for fatigue.  PT will continue to modify and progress as tolerated.  Patient would benefit from continued PT      Plan: Continue per plan of care.      Precautions: R Transmetatarsal amputation ~5 years, s/p R TKA revision 24, DM type 2    POC expires Unit limit Auth  expiration date PT/OT + Visit Limit?   2/3/25 N/A  DOMN      Sec - 60 visits per auth           Visit/Unit Tracking  AUTH Status:  Date            N/A Used 1 2 3 4            Remaining                     Manuals                                                              Neuro Re-Ed             SLR 3 way    2x10 B/L FLEX/ABD         Prone hamstring curls    2x10 3# B/L         Bridges w/ hip ABD    2x10 RTB         Supine PPT    x20         LAQ w/ hold    5'' x20 B/L         Standing 3 way hip   x10 B/L ABD/EXT          Seated hip ABD   2x20 GTB          Lateral walks   NT          Seated marches   2x10 B/L          Seated webslide: M, L rows   2x10 M, L          [place] YUMIKO             Tandem stance             TG squats                                       Ther Ex             Pt education Physiological response to exercise, HEP   HEP         Nustep for cardio  L1 10' NV NV         STS HEP 2x10 w/ UE assist and foam pad 2x10 w/  UE assist and foam pad HEP         Supine hip flexor stretch w/ strap  3x30''  B/L           Supine hamstring stretch w/ strap             Seated hamstring stretch w/ strap  3x30'' B/L                                     Ther Activity             Alt step taps  2x10 4''                        Gait Training                                       Modalities

## 2024-11-27 ENCOUNTER — OFFICE VISIT (OUTPATIENT)
Dept: PHYSICAL THERAPY | Facility: CLINIC | Age: 59
End: 2024-11-27
Payer: MEDICARE

## 2024-11-27 DIAGNOSIS — Z96.651 STATUS POST REVISION OF TOTAL KNEE REPLACEMENT, RIGHT: Primary | ICD-10-CM

## 2024-11-27 DIAGNOSIS — R53.81 PHYSICAL DECONDITIONING: ICD-10-CM

## 2024-11-27 PROCEDURE — 97110 THERAPEUTIC EXERCISES: CPT

## 2024-11-27 PROCEDURE — 97112 NEUROMUSCULAR REEDUCATION: CPT

## 2024-11-27 NOTE — PROGRESS NOTES
Daily Note     Today's date: 2024  Patient name: Aaron Richards  : 1965  MRN: [id number]  Referring provider: Dionne Kuhn PA-C  Dx:   Encounter Diagnosis     ICD-10-CM    1. Status post revision of total knee replacement, right  Z96.651       2. Physical deconditioning  R53.81                      Subjective: Pt reports feeling sore after last visit however was not significantly fatigued for remainder of day.      Objective: See treatment diary below      Assessment: Tolerated treatment well. Implemented additional exercised further challenge lower extremity and endurance, pt able to tolerate increased activity today without significant increase in fatigue.  PT will continue to progress as appropriate.   Patient would benefit from continued PT      Plan: Continue per plan of care.      Precautions: R Transmetatarsal amputation ~5 years, s/p R TKA revision 24, DM type 2    POC expires Unit limit Auth  expiration date PT/OT + Visit Limit?   2/3/25 N/A  DOMN      Sec - 60 visits per auth           Visit/Unit Tracking  AUTH Status:  Date           N/A Used 1 2 3 4 5           Remaining                     Manuals                                                             Neuro Re-Ed             SLR 3 way    2x10 B/L FLEX/ABD 2x10 B/L FLEX/ABD        Prone hamstring curls    2x10 3# B/L 2xx10 3# B/L        Bridges w/ hip ABD    2x10 RTB 2x10        Supine PPT    x20 x20                     LAQ w/ hold    5'' x20 B/L         Matrix flex/ext     2x10 ea 20#        Standing 3 way hip   x10 B/L ABD/EXT          Seated hip ABD   2x20 GTB          Lateral walks   NT          Seated marches   2x10 B/L          Seated webslide: M, L rows   2x10 M, L          [place] YUMIKO             Tandem stance             TG squats     NV if able                                  Ther Ex             Pt education Physiological response to exercise, HEP   HEP          Nustep for cardio  L1 10' NV NV L1 5'        STS HEP 2x10 w/ UE assist and foam pad 2x10 w/ UE assist and foam pad HEP         Supine hip flexor stretch w/ strap  3x30''  B/L           Supine hamstring stretch w/ strap             Seated hamstring stretch w/ strap  3x30'' B/L   3x30'' B/L                                  Ther Activity             Alt step taps  2x10 4''                        Gait Training                                       Modalities

## 2024-11-28 DIAGNOSIS — E03.9 ACQUIRED HYPOTHYROIDISM: ICD-10-CM

## 2024-11-29 RX ORDER — LEVOTHYROXINE SODIUM 175 UG/1
175 TABLET ORAL
Qty: 90 TABLET | Refills: 1 | Status: SHIPPED | OUTPATIENT
Start: 2024-11-29

## 2024-12-02 ENCOUNTER — OFFICE VISIT (OUTPATIENT)
Dept: PHYSICAL THERAPY | Facility: CLINIC | Age: 59
End: 2024-12-02
Payer: MEDICARE

## 2024-12-02 DIAGNOSIS — R53.81 PHYSICAL DECONDITIONING: ICD-10-CM

## 2024-12-02 DIAGNOSIS — Z96.651 STATUS POST REVISION OF TOTAL KNEE REPLACEMENT, RIGHT: Primary | ICD-10-CM

## 2024-12-02 PROCEDURE — 97110 THERAPEUTIC EXERCISES: CPT

## 2024-12-02 PROCEDURE — 97112 NEUROMUSCULAR REEDUCATION: CPT

## 2024-12-02 NOTE — PROGRESS NOTES
Daily Note     Today's date: 2024  Patient name: Aaron Richards  : 1965  MRN: [id number]  Referring provider: Dionne Kuhn PA-C  Dx:   Encounter Diagnosis     ICD-10-CM    1. Status post revision of total knee replacement, right  Z96.651       2. Physical deconditioning  R53.81                      Subjective: Pt reports he was sore after last session but felt fine.      Objective: See treatment diary below      Assessment: Tolerated treatment well. Implemented TG squats to facilitate squat technique and gravity eliminated position.  Able to increase resistance with current interventions without significant exacerbations in fatigue.  PT will continue to progress as tolerated.  Patient would benefit from continued PT      Plan: Continue per plan of care.      Precautions: R Transmetatarsal amputation ~5 years, s/p R TKA revision 24, DM type 2    POC expires Unit limit Auth  expiration date PT/OT + Visit Limit?   2/3/25 N/A  DOMN      Sec - 60 visits per auth           Visit/Unit Tracking  AUTH Status:  Date  12/         N/A Used 1 2 3 4 5 6          Remaining                     Manuals  12/2                                                           Neuro Re-Ed             SLR 3 way    2x10 B/L FLEX/ABD 2x10 B/L FLEX/ABD 2x10 B/L FLEX 3#/ABD       Prone hamstring curls    2x10 3# B/L 2x10 3# B/L        Bridges w/ hip ABD    2x10 RTB 2x10 2x10 GTB       Supine PPT    x20 x20                     LAQ w/ hold    5'' x20 B/L         Matrix flex/ext     2x10 ea 20# 2x10 ea 20# EXT, 30# FLEX       Standing 3 way hip   x10 B/L ABD/EXT          Seated hip ABD   2x20 GTB          Lateral walks   NT          Seated marches   2x10 B/L          Seated webslide: M, L rows   2x10 M, L          [place] YUMIKO             Tandem stance             TG squats     NV if able 2x10 L22                                 Ther Ex             Pt education  Physiological response to exercise, HEP   HEP         Nustep for cardio  L1 10' NV NV L1 5' L1 10'       STS HEP 2x10 w/ UE assist and foam pad 2x10 w/ UE assist and foam pad HEP         Supine hip flexor stretch w/ strap  3x30''  B/L           Supine hamstring stretch w/ strap      3x30'' B/L       Seated hamstring stretch w/ strap  3x30'' B/L   3x30'' B/L                                  Ther Activity             Alt step taps  2x10 4''                        Gait Training                                       Modalities

## 2024-12-04 ENCOUNTER — OFFICE VISIT (OUTPATIENT)
Dept: PHYSICAL THERAPY | Facility: CLINIC | Age: 59
End: 2024-12-04
Payer: MEDICARE

## 2024-12-04 DIAGNOSIS — Z96.651 STATUS POST REVISION OF TOTAL KNEE REPLACEMENT, RIGHT: Primary | ICD-10-CM

## 2024-12-04 DIAGNOSIS — R53.81 PHYSICAL DECONDITIONING: ICD-10-CM

## 2024-12-04 PROCEDURE — 97112 NEUROMUSCULAR REEDUCATION: CPT

## 2024-12-04 PROCEDURE — 97110 THERAPEUTIC EXERCISES: CPT

## 2024-12-09 ENCOUNTER — APPOINTMENT (OUTPATIENT)
Dept: PHYSICAL THERAPY | Facility: CLINIC | Age: 59
End: 2024-12-09
Payer: MEDICARE

## 2024-12-10 ENCOUNTER — TELEPHONE (OUTPATIENT)
Dept: GASTROENTEROLOGY | Facility: CLINIC | Age: 59
End: 2024-12-10

## 2024-12-10 NOTE — TELEPHONE ENCOUNTER
L/M for patient to call and schedule his recall Colonoscopy with Dr Arguello    Mailed recall Letter

## 2024-12-11 ENCOUNTER — APPOINTMENT (OUTPATIENT)
Dept: PHYSICAL THERAPY | Facility: CLINIC | Age: 59
End: 2024-12-11
Payer: MEDICARE

## 2024-12-11 NOTE — PROGRESS NOTES
{SL AMB PT/OT NOTE TYPE:63236}    Today's date: 2024  Patient name: Aaron Richards  : 1965  MRN: [id number]  Referring provider: Dionne Kuhn PA-C  Dx: No diagnosis found.               Assessment  Understanding of Dx/Px/POC: good     Prognosis: good    Goals  STG to be achieved by 4 weeks  -Pt will be independent with basic HEP  -Pt will improve MMT scores by 1/2 grade in all deficient planes in order to facilitate ease of functional activity  -Pt will improve ROM by 25% in all deficient planes in order to improve functional mobility  -Pt will improve 5xSTS score by 2 seconds in order to demonstrate increased functional strength.  -Pt will improve TUG score by 2 seconds in order to demonstrate improved functional mobility and decreased risk of falls.    LTG to be achieved by Discharge  -Pt will be independent with comprehensive HEP  -Pt will improve MMT scores to WFL in all deficient planes in order to facilitate ease of functional activity  -Pt will improve ROM to WFL in all deficient planes in order to improve functional mobility  -Pt will report 0/10 at worst in order to demonstrate return to PLOF  -Pt will report no pain with usual ADLs  -Pt will improve 5xSTS score by 4 seconds in order to demonstrate increased functional strength.  -Pt will improve TUG score by 4 seconds in order to demonstrate improved functional mobility and decreased risk of falls.         Plan  Patient would benefit from: skilled physical therapy  Referral necessary: No    Planned therapy interventions: flexibility, functional ROM exercises, graded activity, graded exercise, home exercise program, joint mobilization, manual therapy, neuromuscular re-education, patient/caregiver education, sensory integrative techniques, strengthening, stretching, therapeutic activities, therapeutic exercise, balance and balance/weight bearing training    Frequency: 2x week  Duration in weeks: 12  Plan of Care beginning date:  2024  Plan of Care expiration date: 2/3/2025  Treatment plan discussed with: patient        Subjective Evaluation    History of Present Illness  Mechanism of injury:                  Not a recurrent problem   Quality of life: good    Patient Goals  Patient goals for therapy: improved balance, decreased pain and increased strength    Pain  Current pain ratin  At best pain ratin  At worst pain ratin    Social Support  Steps to enter house: yes  4  Lives in: one-story house  Lives with: spouse    Employment status: not working    Diagnostic Tests  X-ray: normal  Treatments  Previous treatment: physical therapy        Objective     Active Range of Motion   Left Hip   Normal active range of motion    Right Hip   Normal active range of motion  Left Knee   Prone flexion: 120 degrees   Extension: -5 degrees     Right Knee   Prone flexion: 111 degrees   Extension: -5 degrees   Left Ankle/Foot   Dorsiflexion (kf): 3 degrees   Plantar flexion: 16 degrees     Right Ankle/Foot   Dorsiflexion (kf): 0 degrees   Plantar flexion: 10 degrees     Passive Range of Motion   Left Knee   Prone flexion: 135 degrees     Right Knee   Prone flexion: 120 degrees     Right Ankle/Foot    Dorsiflexion (kf): 10 degrees    Plantar flexion: 18 degrees     Strength/Myotome Testing     Left Hip   Planes of Motion   Flexion: 4  Abduction: 4+  Adduction: 4+    Right Hip   Planes of Motion   Flexion: 4  Abduction: 4+  Adduction: 4+    Left Knee   Flexion: 5  Extension: 5    Right Knee   Flexion: 4+  Extension: 4+    Left Ankle/Foot   Dorsiflexion: 3-    Right Ankle/Foot   Dorsiflexion: 2+    Ambulation     Observational Gait   Right foot contact pattern: foot flat    Additional Observational Gait Details  Right foot drop      Functional tests and measures 2024:  5xSTS: 16.65 w/ UE assist  TU.01 w/ RW       Precautions: R Transmetatarsal amputation ~5 years, s/p R TKA revision 24, DM type 2    POC expires Unit limit  Auth  expiration date PT/OT + Visit Limit?   2/3/25 N/A  DOMN      Sec - 60 visits per auth           Visit/Unit Tracking  AUTH Status:  Date 11/11 11/14 11/21 11/25 11/27 12/2 12/4        N/A Used 1 2 3 4 5 6 7         Remaining                     Manuals 11/11 11/14 11/21 11/25 11/27 12/2 12/4                                                          Neuro Re-Ed             SLR 3 way    2x10 B/L FLEX/ABD 2x10 B/L FLEX/ABD 2x10 B/L FLEX 3#/ABD       Prone hamstring curls    2x10 3# B/L 2x10 3# B/L        Bridges w/ hip ABD    2x10 RTB 2x10 2x10 GTB       Supine PPT    x20 x20                     LAQ w/ hold    5'' x20 B/L         Matrix flex/ext     2x10 ea 20# 2x10 ea 20# EXT, 30# FLEX 3x10 ea 20# EXT, 30# FLEX      Standing 3 way hip   x10 B/L ABD/EXT    X20 ea ABD/EXT      Seated hip ABD   2x20 GTB          Lateral walks   NT          Seated marches   2x10 B/L          Seated webslide: M, L rows   2x10 M, L          [place] YUMIKO             Tandem stance             TG squats     NV if able 2x10 L22 2x10 L22 w/ eccentric control                                Ther Ex             Pt education Physiological response to exercise, HEP   HEP         Nustep for cardio  L1 10' NV NV L1 5' L1 10' L1 10'      STS HEP 2x10 w/ UE assist and foam pad 2x10 w/ UE assist and foam pad HEP         Supine hip flexor stretch w/ strap  3x30''  B/L           Supine hamstring stretch w/ strap      3x30'' B/L 3x30'' B/L      Seated hamstring stretch w/ strap  3x30'' B/L   3x30'' B/L                                  Ther Activity             Alt step taps  2x10 4''                        Gait Training                                       Modalities

## 2024-12-16 ENCOUNTER — APPOINTMENT (OUTPATIENT)
Dept: PHYSICAL THERAPY | Facility: CLINIC | Age: 59
End: 2024-12-16
Payer: MEDICARE

## 2024-12-18 ENCOUNTER — OFFICE VISIT (OUTPATIENT)
Dept: PHYSICAL THERAPY | Facility: CLINIC | Age: 59
End: 2024-12-18
Payer: MEDICARE

## 2024-12-18 DIAGNOSIS — Z96.651 STATUS POST REVISION OF TOTAL KNEE REPLACEMENT, RIGHT: Primary | ICD-10-CM

## 2024-12-18 DIAGNOSIS — R53.81 PHYSICAL DECONDITIONING: ICD-10-CM

## 2024-12-18 PROCEDURE — 97112 NEUROMUSCULAR REEDUCATION: CPT

## 2024-12-18 PROCEDURE — 97110 THERAPEUTIC EXERCISES: CPT

## 2024-12-18 NOTE — PROGRESS NOTES
"Daily Note     Today's date: 2024  Patient name: Aaron Richards  : 1965  MRN: [id number]  Referring provider: Dionne Kuhn PA-C  Dx:   Encounter Diagnosis     ICD-10-CM    1. Status post revision of total knee replacement, right  Z96.651       2. Physical deconditioning  R53.81                      Subjective: Patient requests to do lighter workout today as he returns to PT after having the flu and he is back tomorrow. He wants to work on his balance at some point but he knows his stamina is not great.       Objective: See treatment diary below      Assessment: Modified program secondary to subjective. Focus on table program. Challenged on R>L to maintain knee ext as he fatigues. Appropriately challenged with reps and resistance with outlined exercises.  Patient demonstrated fatigue post treatment and would benefit from continued PT      Plan: Progress treatment as tolerated.       Precautions: R Transmetatarsal amputation ~5 years, s/p R TKA revision 24, DM type 2    POC expires Unit limit Auth  expiration date PT/OT + Visit Limit?   2/3/25 N/A  DOMN      Sec - 60 visits per auth           Visit/Unit Tracking  AUTH Status:  Date        N/A Used 1 2 3 4 5 6 7 8        Remaining                     Manuals                                                          Neuro Re-Ed             SLR 3 way    2x10 B/L FLEX/ABD 2x10 B/L FLEX/ABD 2x10 B/L FLEX 3#/ABD  2x10   B/L #1  flex only       Prone hamstring curls    2x10 3# B/L 2x10 3# B/L        Bridges w/ hip ABD    2x10 RTB 2x10 2x10 GTB  15x GTB     Supine PPT    x20 x20                     LAQ w/ hold    5'' x20 B/L    5\" 2x10 B/L     Matrix flex/ext     2x10 ea 20# 2x10 ea 20# EXT, 30# FLEX 3x10 ea 20# EXT, 30# FLEX      Standing 3 way hip   x10 B/L ABD/EXT    X20 ea ABD/EXT      Seated hip ABD   2x20 GTB          Lateral walks   NT          Seated " "marches   2x10 B/L          Seated webslide: M, L rows   2x10 M, L          [place] YUMIKO             Tandem stance             TG squats     NV if able 2x10 L22 2x10 L22 w/ eccentric control NV                               Ther Ex             Pt education Physiological response to exercise, HEP   HEP         Nustep for cardio  L1 10' NV NV L1 5' L1 10' L1 10' L1 10'      STS HEP 2x10 w/ UE assist and foam pad 2x10 w/ UE assist and foam pad HEP         Supine hip flexor stretch w/ strap  3x30''  B/L           Supine hamstring stretch w/ strap      3x30'' B/L 3x30'' B/L 3x30\" B/L     Seated hamstring stretch w/ strap  3x30'' B/L   3x30'' B/L                                  Ther Activity             Alt step taps  2x10 4''                        Gait Training                                       Modalities                                                        "

## 2024-12-19 ENCOUNTER — OFFICE VISIT (OUTPATIENT)
Dept: PHYSICAL THERAPY | Facility: CLINIC | Age: 59
End: 2024-12-19
Payer: MEDICARE

## 2024-12-19 DIAGNOSIS — R53.81 PHYSICAL DECONDITIONING: ICD-10-CM

## 2024-12-19 DIAGNOSIS — Z96.651 STATUS POST REVISION OF TOTAL KNEE REPLACEMENT, RIGHT: Primary | ICD-10-CM

## 2024-12-19 PROCEDURE — 97110 THERAPEUTIC EXERCISES: CPT

## 2024-12-19 NOTE — PROGRESS NOTES
"Daily Note     Today's date: 2024  Patient name: Aaron Richards  : 1965  MRN: [id number]  Referring provider: Dionne Kuhn PA-C  Dx:   Encounter Diagnosis     ICD-10-CM    1. Status post revision of total knee replacement, right  Z96.651       2. Physical deconditioning  R53.81                      Subjective: Patient reports not feeling great at this time.       Objective: See treatment diary below      Assessment: Pt declined all supine  and standing TE today due to feeling unwell.     Plan: Continue per plan of care.      Precautions: R Transmetatarsal amputation ~5 years, s/p R TKA revision 24, DM type 2    POC expires Unit limit Auth  expiration date PT/OT + Visit Limit?   2/3/25 N/A  DOMN      Sec - 60 visits per auth           Visit/Unit Tracking  AUTH Status:  Date       N/A Used 1 2 3 4 5 6 7 8 9       Remaining                     Manuals                                                         Neuro Re-Ed             SLR 3 way    2x10 B/L FLEX/ABD 2x10 B/L FLEX/ABD 2x10 B/L FLEX 3#/ABD  2x10   B/L #1  flex only       Prone hamstring curls    2x10 3# B/L 2x10 3# B/L        Bridges w/ hip ABD    2x10 RTB 2x10 2x10 GTB  15x GTB Hip abd seated      Supine PPT    x20 x20                     LAQ w/ hold    5'' x20 B/L    5\" 2x10 B/L 5\" 3x10    Matrix flex/ext     2x10 ea 20# 2x10 ea 20# EXT, 30# FLEX 3x10 ea 20# EXT, 30# FLEX  3x10 ea 20# EXT, 30# FLEX    Standing 3 way hip   x10 B/L ABD/EXT    X20 ea ABD/EXT      Seated hip ABD   2x20 GTB          Lateral walks   NT          Seated marches   2x10 B/L      Seated hip add iso 5\" x30    Seated webslide: M, L rows   2x10 M, L          [place] YUMIKO             Tandem stance             TG squats     NV if able 2x10 L22 2x10 L22 w/ eccentric control NV                               Ther Ex             Pt education Physiological response to " "exercise, HEP   HEP         Nustep for cardio  L1 10' NV NV L1 5' L1 10' L1 10' L1 10'  L1 x10'    STS HEP 2x10 w/ UE assist and foam pad 2x10 w/ UE assist and foam pad HEP         Supine hip flexor stretch w/ strap  3x30''  B/L           Supine hamstring stretch w/ strap      3x30'' B/L 3x30'' B/L 3x30\" B/L Seated 30\"x3 ea    Seated hamstring stretch w/ strap  3x30'' B/L   3x30'' B/L                                  Ther Activity             Alt step taps  2x10 4''                        Gait Training                                       Modalities                                                          "

## 2024-12-20 DIAGNOSIS — F32.A DEPRESSION, UNSPECIFIED DEPRESSION TYPE: ICD-10-CM

## 2024-12-21 RX ORDER — ESCITALOPRAM OXALATE 5 MG/1
5 TABLET ORAL DAILY
Qty: 30 TABLET | Refills: 5 | Status: SHIPPED | OUTPATIENT
Start: 2024-12-21

## 2024-12-23 ENCOUNTER — OFFICE VISIT (OUTPATIENT)
Dept: PHYSICAL THERAPY | Facility: CLINIC | Age: 59
End: 2024-12-23
Payer: MEDICARE

## 2024-12-23 DIAGNOSIS — Z96.651 STATUS POST REVISION OF TOTAL KNEE REPLACEMENT, RIGHT: Primary | ICD-10-CM

## 2024-12-23 DIAGNOSIS — R53.81 PHYSICAL DECONDITIONING: ICD-10-CM

## 2024-12-23 PROCEDURE — 97110 THERAPEUTIC EXERCISES: CPT

## 2024-12-23 PROCEDURE — 97112 NEUROMUSCULAR REEDUCATION: CPT

## 2024-12-23 NOTE — PROGRESS NOTES
"Daily Note     Today's date: 2024  Patient name: Aaron Richards  : 1965  MRN: [id number]  Referring provider: Dionne Kuhn PA-C  Dx:   Encounter Diagnosis     ICD-10-CM    1. Status post revision of total knee replacement, right  Z96.651       2. Physical deconditioning  R53.81                      Subjective: Pt states he is feeling better since coming off of the flu.      Objective: See treatment diary below      Assessment: Tolerated treatment well. Pt did request to hold off on standing exercises for time being. Instructed pt on heel raises for performance of HEP for drop foot, pt demonstrated understanding.  Patient would benefit from continued PT      Plan: Continue per plan of care.      Precautions: R Transmetatarsal amputation ~5 years, s/p R TKA revision 24, DM type 2    POC expires Unit limit Auth  expiration date PT/OT + Visit Limit?   2/3/25 N/A  BOMN      Sec - 60 visits per auth           Visit/Unit Tracking  AUTH Status:  Date      N/A Used 1 2 3 4 5 6 7 8 9 10      Remaining                     Manuals                                                        Neuro Re-Ed             SLR 3 way    2x10 B/L FLEX/ABD 2x10 B/L FLEX/ABD 2x10 B/L FLEX 3#/ABD  2x10   B/L #1  flex only       Prone hamstring curls    2x10 3# B/L 2x10 3# B/L        Bridges w/ hip ABD    2x10 RTB 2x10 2x10 GTB  15x GTB Hip abd seated      Supine PPT    x20 x20                     LAQ w/ hold    5'' x20 B/L    5\" 2x10 B/L 5\" 3x10 5\" 3x10  3# AW   Matrix flex/ext     2x10 ea 20# 2x10 ea 20# EXT, 30# FLEX 3x10 ea 20# EXT, 30# FLEX  3x10 ea 20# EXT, 30# FLEX 3x10 ea 25# EXT, 30# FLEX   Standing 3 way hip   x10 B/L ABD/EXT    X20 ea ABD/EXT      Seated hip ABD   2x20 GTB          Lateral walks   NT          Seated marches   2x10 B/L      Seated hip add iso 5\" x30 Seated hip add iso 5\" x30 3# AW " "  Seated webslide: M, L rows   2x10 M, L          [place] YUMIKO             Tandem stance             TG squats     NV if able 2x10 L22 2x10 L22 w/ eccentric control NV  3x10 L22   Seated toe raises          Instructed                Ther Ex             Pt education Physiological response to exercise, HEP   HEP         Nustep for cardio  L1 10' NV NV L1 5' L1 10' L1 10' L1 10'  L1 x10' L1 x10'   STS HEP 2x10 w/ UE assist and foam pad 2x10 w/ UE assist and foam pad HEP         Supine hip flexor stretch w/ strap  3x30''  B/L           Supine hamstring stretch w/ strap      3x30'' B/L 3x30'' B/L 3x30\" B/L Seated 30\"x3 ea Seated 30\"x3 ea   Seated hamstring stretch w/ strap  3x30'' B/L   3x30'' B/L                                  Ther Activity             Alt step taps  2x10 4''                        Gait Training                                       Modalities                                                            "

## 2024-12-26 ENCOUNTER — EVALUATION (OUTPATIENT)
Dept: PHYSICAL THERAPY | Facility: CLINIC | Age: 59
End: 2024-12-26
Payer: MEDICARE

## 2024-12-26 DIAGNOSIS — R53.81 PHYSICAL DECONDITIONING: ICD-10-CM

## 2024-12-26 DIAGNOSIS — Z96.651 STATUS POST REVISION OF TOTAL KNEE REPLACEMENT, RIGHT: Primary | ICD-10-CM

## 2024-12-26 PROCEDURE — 97110 THERAPEUTIC EXERCISES: CPT

## 2024-12-26 PROCEDURE — 97140 MANUAL THERAPY 1/> REGIONS: CPT

## 2024-12-26 NOTE — PROGRESS NOTES
PT Re-Evaluation     Today's date: 2024  Patient name: Aaron Richards  : 1965  MRN: [id number]  Referring provider: Dionne Kuhn PA-C  Dx:   Encounter Diagnosis     ICD-10-CM    1. Status post revision of total knee replacement, right  Z96.651       2. Physical deconditioning  R53.81                      Assessment    Assessment details: Pt presents today for reassessment.  Prior to assessment pt reporting 30% improvement in strength and function since initiation of physical therapy.  Manual muscle testing reveals considerable gains in lower extremity strength across all previously tested muscle groups.  Functional tests and measures reveal notable improvements in 5xSTS from 16.65s to 13.17s as well as TUG score from 24.01s to 17.31s.  Pt able to complete HAMILTON balance test today with result of .  Despite improvements, pt continues to remain limited in functional capacity per previously mentioned tests and measures as compared to their peers, would benefit from continued intervention in order to further improve functional strength and endurance while initiating interventions focused on restoring balance and proprioception in order to improve ease of daily activity, decrease risk for falls, and improve overall quality of life.   Understanding of Dx/Px/POC: good     Prognosis: good    Goals  STG to be achieved by 4 weeks  -Pt will be independent with basic HEP.  MET  -Pt will improve MMT scores by 1/2 grade in all deficient planes in order to facilitate ease of functional activity.  MET  -Pt will improve ROM by 25% in all deficient planes in order to improve functional mobility.  ONGOING  -Pt will improve 5xSTS score by 2 seconds in order to demonstrate increased functional strength.  MET  -Pt will improve TUG score by 2 seconds in order to demonstrate improved functional mobility and decreased risk of falls.  MET    LTG to be achieved by Discharge  -Pt will be independent with comprehensive  HEP.  PROGRESS  -Pt will improve MMT scores to WFL in all deficient planes in order to facilitate ease of functional activity.  PROGRESS  -Pt will improve ROM to WFL in all deficient planes in order to improve functional mobility.  ONGOING  -Pt will report 0/10 at worst in order to demonstrate return to PLOF.  ONGOING  -Pt will report no pain with usual ADLs. MET  -Pt will improve 5xSTS score by 4 seconds in order to demonstrate increased functional strength.  MET  -Pt will improve TUG score by 4 seconds in order to demonstrate improved functional mobility and decreased risk of falls.  MET  -Pt will improve roblero balance score from 19/56 to 30/56 in order to demonstrate increased postural stability during functional activity.         Plan  Patient would benefit from: skilled physical therapy  Referral necessary: No    Planned therapy interventions: flexibility, functional ROM exercises, joint mobilization, manual therapy, neuromuscular re-education, patient/caregiver education, sensory integrative techniques, strengthening, stretching, therapeutic activities, therapeutic exercise, balance, balance/weight bearing training and home exercise program    Frequency: 2x week  Plan of Care beginning date: 2024  Plan of Care expiration date: 3/20/2025  Treatment plan discussed with: patient        Subjective Evaluation    History of Present Illness  Mechanism of injury:          Patient Goals  Patient goals for therapy: improved balance, decreased pain and increased strength    Pain  Current pain ratin  At best pain ratin  At worst pain ratin    Social Support  Steps to enter house: yes  4  Lives in: one-story house  Lives with: spouse    Employment status: not working    Diagnostic Tests  X-ray: normal  Treatments  Previous treatment: physical therapy      Objective     Active Range of Motion   Left Hip   Normal active range of motion    Right Hip   Normal active range of motion  Left Knee   Prone flexion:  "120 degrees   Extension: -5 degrees     Right Knee   Prone flexion: 111 degrees   Extension: -5 degrees   Left Ankle/Foot   Dorsiflexion (kf): 3 degrees   Plantar flexion: 16 degrees     Right Ankle/Foot   Dorsiflexion (kf): 0 degrees   Plantar flexion: 10 degrees     Passive Range of Motion   Left Knee   Prone flexion: 135 degrees     Right Knee   Prone flexion: 120 degrees     Right Ankle/Foot    Dorsiflexion (kf): 10 degrees    Plantar flexion: 18 degrees     Strength/Myotome Testing     Left Hip   Planes of Motion   Flexion: 4+  Abduction: 5  Adduction: 4+    Right Hip   Planes of Motion   Flexion: 4+  Abduction: 5  Adduction: 4+    Left Knee   Flexion: 5  Extension: 5    Right Knee   Flexion: 5  Extension: 5    Left Ankle/Foot   Dorsiflexion: 3-    Right Ankle/Foot   Dorsiflexion: 2+    Ambulation     Observational Gait   Right foot contact pattern: foot flat    Additional Observational Gait Details  Right foot drop    Functional Assessment        Comments  Functional tests and measures 2024:  5xSTS: 13.17 w/ UE assist  TU.31 w/ RW  HAMILTON/56               Precautions: R Transmetatarsal amputation ~5 years, s/p R TKA revision 24, DM type 2    POC expires Unit limit Auth  expiration date PT/OT + Visit Limit?   2/3/25 N/A  DOMN      Sec - 60 visits per auth           Visit/Unit Tracking  AUTH Status:  Date      N/A Used 1 2 3 4 5 6 7 8 9 10      Remaining                     Manuals     Reassessment          JK                                          Neuro Re-Ed             SLR 3 way    2x10 B/L FLEX/ABD 2x10 B/L FLEX/ABD 2x10 B/L FLEX 3#/ABD  2x10   B/L #1  flex only       Prone hamstring curls    2x10 3# B/L 2x10 3# B/L        Bridges w/ hip ABD    2x10 RTB 2x10 2x10 GTB  15x GTB Hip abd seated      Supine PPT    x20 x20                     LAQ w/ hold    5'' x20 B/L    5\" 2x10 B/L 5\" " "3x10    Matrix flex/ext     2x10 ea 20# 2x10 ea 20# EXT, 30# FLEX 3x10 ea 20# EXT, 30# FLEX  3x10 ea 20# EXT, 30# FLEX    Standing 3 way hip   x10 B/L ABD/EXT    X20 ea ABD/EXT      Seated hip ABD   2x20 GTB          Lateral walks   NT          Seated marches   2x10 B/L      Seated hip add iso 5\" x30    Seated webslide: M, L rows   2x10 M, L          Seated MB Diagonal chops          NV   FT EO firm          NV   FT EC firm          NV   [place] YUMIKO          NV   Tandem stance          NV   TG squats     NV if able 2x10 L22 2x10 L22 w/ eccentric control NV                               Ther Ex             Pt education Physiological response to exercise, HEP   HEP      Biomechanics, AD usage   Nustep for cardio  L1 10' NV NV L1 5' L1 10' L1 10' L1 10'  L1 x10'    STS HEP 2x10 w/ UE assist and foam pad 2x10 w/ UE assist and foam pad HEP         Supine hip flexor stretch w/ strap  3x30''  B/L           Supine hamstring stretch w/ strap      3x30'' B/L 3x30'' B/L 3x30\" B/L Seated 30\"x3 ea    Seated hamstring stretch w/ strap  3x30'' B/L   3x30'' B/L                                  Ther Activity             Alt step taps  2x10 4''        NV   Sidestepping on foam          NV   Gait Training                                       Modalities                                           "

## 2024-12-30 ENCOUNTER — OFFICE VISIT (OUTPATIENT)
Dept: PHYSICAL THERAPY | Facility: CLINIC | Age: 59
End: 2024-12-30
Payer: MEDICARE

## 2024-12-30 DIAGNOSIS — Z96.651 STATUS POST REVISION OF TOTAL KNEE REPLACEMENT, RIGHT: Primary | ICD-10-CM

## 2024-12-30 DIAGNOSIS — R53.81 PHYSICAL DECONDITIONING: ICD-10-CM

## 2024-12-30 PROCEDURE — 97110 THERAPEUTIC EXERCISES: CPT

## 2024-12-30 PROCEDURE — 97112 NEUROMUSCULAR REEDUCATION: CPT

## 2024-12-30 NOTE — PROGRESS NOTES
"Daily Note     Today's date: 2024  Patient name: Aaron Richards  : 1965  MRN: [id number]  Referring provider: Dionne Kuhn PA-C  Dx:   Encounter Diagnosis     ICD-10-CM    1. Status post revision of total knee replacement, right  Z96.651       2. Physical deconditioning  R53.81                      Subjective: Pt reports feeling good after last visit.      Objective: See treatment diary below      Assessment: Tolerated treatment well. Initiated balance activities today to facilitate improved postural stability, pt demonstrated significant difficulty under conditions of tandem stance and demonstrated several anterior LOB requiring 2 UE assist at bar. Cued pt to inititiate more posterior weightshift, able to implement cue however does exhibit posterior LOB when attempted with [place] for correction.  Patient would benefit from continued PT      Plan: Continue per plan of care.      Precautions: R Transmetatarsal amputation ~5 years, s/p R TKA revision 24, DM type 2    POC expires Unit limit Auth  expiration date PT/OT + Visit Limit?   2/3/25 N/A  DOMN      Sec - 60 visits per auth           Visit/Unit Tracking  AUTH Status:  Date     N/A Used 1 2 3 4 5 6 7 8 9 10 11     Remaining                     Manuals    Reassessment          JK                                          Neuro Re-Ed             SLR 3 way    2x10 B/L FLEX/ABD 2x10 B/L FLEX/ABD 2x10 B/L FLEX 3#/ABD  2x10   B/L #1  flex only       Prone hamstring curls    2x10 3# B/L 2x10 3# B/L        Bridges w/ hip ABD    2x10 RTB 2x10 2x10 GTB  15x GTB Hip abd seated      Supine PPT    x20 x20                     LAQ w/ hold    5'' x20 B/L    5\" 2x10 B/L 5\" 3x10    Matrix flex/ext     2x10 ea 20# 2x10 ea 20# EXT, 30# FLEX 3x10 ea 20# EXT, 30# FLEX  3x10 ea 20# EXT, 30# FLEX    Standing 3 way hip   x10 B/L ABD/EXT    X20 ea " "ABD/EXT      Seated hip ABD   2x20 GTB          Lateral walks   NT          Seated marches   2x10 B/L      Seated hip add iso 5\" x30    Seated webslide: M, L rows   2x10 M, L          Seated MB Diagonal chops X20 ea YMB         NV   FT EO firm 4x30''         NV   FT EC firm unable         NV             NV   Tandem stance 4x30'' 2 finger assist         NV   TG squats     NV if able 2x10 L22 2x10 L22 w/ eccentric control NV                               Ther Ex             Pt education    HEP      Biomechanics, AD usage   Nustep for cardio L2 10' L1 10' NV NV L1 5' L1 10' L1 10' L1 10'  L1 x10'    STS  2x10 w/ UE assist and foam pad 2x10 w/ UE assist and foam pad HEP         Supine hip flexor stretch w/ strap  3x30''  B/L           Supine hamstring stretch w/ strap      3x30'' B/L 3x30'' B/L 3x30\" B/L Seated 30\"x3 ea    Seated hamstring stretch w/ strap  3x30'' B/L   3x30'' B/L                                  Ther Activity             Alt step taps  2x10 4''        NV   Sidestepping on foam          NV   Gait Training                                       Modalities                                                "

## 2025-01-02 NOTE — TELEPHONE ENCOUNTER
Called and spoke to patient  he has just had a knee replacement and will call us when he is ready to schedule

## 2025-01-03 ENCOUNTER — OFFICE VISIT (OUTPATIENT)
Dept: PHYSICAL THERAPY | Facility: CLINIC | Age: 60
End: 2025-01-03
Payer: MEDICARE

## 2025-01-03 DIAGNOSIS — Z96.651 STATUS POST REVISION OF TOTAL KNEE REPLACEMENT, RIGHT: Primary | ICD-10-CM

## 2025-01-03 DIAGNOSIS — R53.81 PHYSICAL DECONDITIONING: ICD-10-CM

## 2025-01-03 PROCEDURE — 97110 THERAPEUTIC EXERCISES: CPT

## 2025-01-03 PROCEDURE — 97112 NEUROMUSCULAR REEDUCATION: CPT

## 2025-01-03 NOTE — PROGRESS NOTES
"Daily Note     Today's date: 1/3/2025  Patient name: Aaron Richards  : 1965  MRN: [id number]  Referring provider: Dionne Kuhn PA-C  Dx:   Encounter Diagnosis     ICD-10-CM    1. Status post revision of total knee replacement, right  Z96.651       2. Physical deconditioning  R53.81                      Subjective: Pt reports doing ok today.      Objective: See treatment diary below      Assessment: Tolerated treatment well. Pt demonstrated less postural sway today during condition of narrow base stance at mirror, however does require UE support at times and CGA.  PT will continue to progress as tolerated.  Patient would benefit from continued PT      Plan: Continue per plan of care.      Precautions: R Transmetatarsal amputation ~5 years, s/p R TKA revision 24, DM type 2    POC expires Unit limit Auth  expiration date PT/OT + Visit Limit?   2/3/25 N/A  DOMN      Sec - 60 visits per auth           Visit/Unit Tracking  AUTH Status:  Date 11/11 11/14 11/21 11/25 11/27 12/2 12/4 12/18 12/19 12/26 12/30 1/3   N/A Used 1 2 3 4 5 6 7 8 9 10 11 12    Remaining                     Manuals 12/30 1/3 11/21 11/25 11/27 12/2 12/4 12/18 12/19 12/26   Reassessment          JK                                          Neuro Re-Ed             SLR 3 way    2x10 B/L FLEX/ABD 2x10 B/L FLEX/ABD 2x10 B/L FLEX 3#/ABD  2x10   B/L #1  flex only       Prone hamstring curls    2x10 3# B/L 2x10 3# B/L        Bridges w/ hip ABD    2x10 RTB 2x10 2x10 GTB  15x GTB Hip abd seated      Supine PPT    x20 x20                     LAQ w/ hold    5'' x20 B/L    5\" 2x10 B/L 5\" 3x10    Matrix flex/ext  3x10 ea 20# EXT, 30# FLEX   2x10 ea 20# 2x10 ea 20# EXT, 30# FLEX 3x10 ea 20# EXT, 30# FLEX  3x10 ea 20# EXT, 30# FLEX    Standing 3 way hip  2x10 ABD/EXT x10 B/L ABD/EXT    X20 ea ABD/EXT      Seated hip ABD   2x20 GTB          Lateral walks   NT          Seated marches   2x10 B/L      Seated hip add iso 5\" x30    Seated webslide: M, L " "rows   2x10 M, L          Seated MB Diagonal chops X20 ea YMB X20 ea YMB        NV   FT EO firm 4x30'' 4x30'' no UE assist, repeated attempts        NV   FT EC firm unable         NV             NV   Tandem stance 4x30'' 2 finger assist 4x30'' 2 finger assist        NV   TG squats  D/C   NV if able 2x10 L22 2x10 L22 w/ eccentric control NV                               Ther Ex             Pt education    HEP      Biomechanics, AD usage   Nustep for cardio L2 10' L2 10' NV NV L1 5' L1 10' L1 10' L1 10'  L1 x10'    STS   2x10 w/ UE assist and foam pad HEP         Supine hip flexor stretch w/ strap             Supine hamstring stretch w/ strap      3x30'' B/L 3x30'' B/L 3x30\" B/L Seated 30\"x3 ea    Seated hamstring stretch w/ strap     3x30'' B/L                                  Ther Activity             Alt step taps          NV   Sidestepping on foam          NV   Gait Training                                       Modalities                                                  "

## 2025-01-08 ENCOUNTER — OFFICE VISIT (OUTPATIENT)
Dept: PHYSICAL THERAPY | Facility: CLINIC | Age: 60
End: 2025-01-08
Payer: MEDICARE

## 2025-01-08 DIAGNOSIS — Z96.651 STATUS POST REVISION OF TOTAL KNEE REPLACEMENT, RIGHT: Primary | ICD-10-CM

## 2025-01-08 DIAGNOSIS — R53.81 PHYSICAL DECONDITIONING: ICD-10-CM

## 2025-01-08 PROCEDURE — 97112 NEUROMUSCULAR REEDUCATION: CPT

## 2025-01-08 PROCEDURE — 97110 THERAPEUTIC EXERCISES: CPT

## 2025-01-08 PROCEDURE — 97530 THERAPEUTIC ACTIVITIES: CPT

## 2025-01-08 NOTE — PROGRESS NOTES
"Daily Note     Today's date: 2025  Patient name: Aaron Richards  : 1965  MRN: [id number]  Referring provider: Dionne Kuhn PA-C  Dx:   Encounter Diagnosis     ICD-10-CM    1. Status post revision of total knee replacement, right  Z96.651       2. Physical deconditioning  R53.81                      Subjective: Pt reports he has been doing more exercise at home.      Objective: See treatment diary below      Assessment: Tolerated treatment well. Trialed ambulation w/ SPC at mirror.  Pt able to complete 4 laps with 1 sitting rest break d/t fatigue and CGA, pt completed without any significant LOB however does display decreased gait speed and stride length.  Able to increase resistance today with current interventions, PT will continue to progress as tolerated.  Patient would benefit from continued PT      Plan: Continue per plan of care.      Precautions: R Transmetatarsal amputation ~5 years, s/p R TKA revision 24, DM type 2    POC expires Unit limit Auth  expiration date PT/OT + Visit Limit?   2/3/25 N/A  DOMN      Sec - 60 visits per auth           Visit/Unit Tracking  AUTH Status:  Date 1/8 11/14 11/21 11/25 11/27 12/2 12/4 12/18 12/19 12/26 12/30 1/3   N/A Used 13 2 3 4 5 6 7 8 9 10 11 12    Remaining                     Manuals 12/30 1/3 1/8 11/25 11/27 12/2 12/4 12/18 12/19 12/26   Reassessment          JK                                          Neuro Re-Ed             SLR 3 way    2x10 B/L FLEX/ABD 2x10 B/L FLEX/ABD 2x10 B/L FLEX 3#/ABD  2x10   B/L #1  flex only       Prone hamstring curls    2x10 3# B/L 2x10 3# B/L        Bridges w/ hip ABD    2x10 RTB 2x10 2x10 GTB  15x GTB Hip abd seated      Supine PPT    x20 x20                     LAQ w/ hold    5'' x20 B/L    5\" 2x10 B/L 5\" 3x10    Matrix flex/ext  3x10 ea 20# EXT, 30# FLEX 3x10 ea 25# EXT, 35# FLEX  2x10 ea 20# 2x10 ea 20# EXT, 30# FLEX 3x10 ea 20# EXT, 30# FLEX  3x10 ea 20# EXT, 30# FLEX    Standing 3 way hip  2x10 ABD/EXT " "2x10 ABD/EXT  YTB    X20 ea ABD/EXT      Seated hip ABD             Lateral walks             Seated marches         Seated hip add iso 5\" x30    Seated webslide: M, L rows             Seated MB Diagonal chops X20 ea YMB X20 ea YMB        NV   FA EO firm   3x30''          FT EO firm 4x30'' 4x30'' no UE assist, repeated attempts NV       NV   FT EC firm unable         NV             NV   Tandem stance 4x30'' 2 finger assist 4x30'' 2 finger assist NV       NV   TG squats  D/C   NV if able 2x10 L22 2x10 L22 w/ eccentric control NV                               Ther Ex             Pt education    HEP      Biomechanics, AD usage   Nustep for cardio L2 10' L2 10' L2 10' NV L1 5' L1 10' L1 10' L1 10'  L1 x10'    STS    HEP         Supine hip flexor stretch w/ strap             Supine hamstring stretch w/ strap      3x30'' B/L 3x30'' B/L 3x30\" B/L Seated 30\"x3 ea    Seated hamstring stretch w/ strap     3x30'' B/L                                  Ther Activity             Alt step taps          NV   Lateral walks   4 laps at mirror        NV   Ambulation w/ SPC   4 laps CGA at mirror          Gait Training                                       Modalities                                                    "

## 2025-01-10 ENCOUNTER — OFFICE VISIT (OUTPATIENT)
Dept: PHYSICAL THERAPY | Facility: CLINIC | Age: 60
End: 2025-01-10
Payer: MEDICARE

## 2025-01-10 DIAGNOSIS — R53.81 PHYSICAL DECONDITIONING: ICD-10-CM

## 2025-01-10 DIAGNOSIS — Z96.651 STATUS POST REVISION OF TOTAL KNEE REPLACEMENT, RIGHT: Primary | ICD-10-CM

## 2025-01-10 PROCEDURE — 97110 THERAPEUTIC EXERCISES: CPT

## 2025-01-10 PROCEDURE — 97112 NEUROMUSCULAR REEDUCATION: CPT

## 2025-01-10 NOTE — PROGRESS NOTES
"Daily Note    Today's date: 01/10/25  Patient name: Aaron Richards  : 1965  MRN: [id number]  Referring provider: Dionne Kuhn PA-C  Dx:   Encounter Diagnosis     ICD-10-CM    1. Status post revision of total knee replacement, right  Z96.651       2. Physical deconditioning  R53.81           Start Time: 1300  Stop Time: 1345  Total time in clinic (min): 45 minutes      Subjective: Aaron reports that he is sore from doing his exercises today. He notes he would like to try custom orthotics in his shoe to help with his amputation.    Objective: See treatment diary below.    Assessment: Aaron tolerated treatment well with consistent cuing throughout. TE's were performed with increased reps and increased resistance. No new TE's were performed today. Following treatment, the patient demonstrated fatigue and would benefit from continued physical therapy.    Plan: Continue per plan of care.        Precautions: R Transmetatarsal amputation ~5 years, s/p R TKA revision 24, DM type 2    POC expires Unit limit Auth  expiration date PT/OT + Visit Limit?   2/3/25 N/A  DOMN      Sec - 60 visits per auth           Visit/Unit Tracking  AUTH Status:  Date 1/8 1/10  11/25 11/27 12/2 12/4 12/18 12/19 12/26 12/30 1/3   N/A Used 13 14  4 5 6 7 8 9 10 11 12    Remaining                     Manuals 12/30 1/3 1/8 1/10  12/2 12/4 12/18 12/19 12/26   Reassessment          JK                                          Neuro Re-Ed             SLR 3 way      2x10 B/L FLEX 3#/ABD  2x10   B/L #1  flex only       Prone hamstring curls             Bridges w/ hip ABD      2x10 GTB  15x GTB Hip abd seated      Supine PPT                          LAQ w/ hold        5\" 2x10 B/L 5\" 3x10    Matrix flex/ext  3x10 ea 20# EXT, 30# FLEX 3x10 ea 25# EXT, 35# FLEX 3x10 30# ext 40# flex  2x10 ea 20# EXT, 30# FLEX 3x10 ea 20# EXT, 30# FLEX  3x10 ea 20# EXT, 30# FLEX    Standing 3 way hip  2x10 ABD/EXT 2x10 ABD/EXT  YTB Decl. today   " "X20 ea ABD/EXT      Seated hip ABD             Lateral walks             Seated marches         Seated hip add iso 5\" x30    Seated webslide: M, L rows             Seated MB Diagonal chops X20 ea YMB X20 ea YMB        NV   FA EO firm   3x30'' 3x30\"         FT EO firm 4x30'' 4x30'' no UE assist, repeated attempts NV 3x30\"      NV   FT EC firm unable         NV             NV   Tandem stance 4x30'' 2 finger assist 4x30'' 2 finger assist NV 3x20\"      NV   TG squats  D/C    2x10 L22 2x10 L22 w/ eccentric control NV                               Ther Ex             Pt education          Biomechanics, AD usage   Nustep for cardio L2 10' L2 10' L2 10' 10' L2   L1 10' L1 10' L1 10'  L1 x10'    STS    3x10          Supine hip flexor stretch w/ strap             Supine hamstring stretch w/ strap      3x30'' B/L 3x30'' B/L 3x30\" B/L Seated 30\"x3 ea    Seated hamstring stretch w/ strap                                       Ther Activity             Alt step taps          NV   Lateral walks   4 laps at mirror        NV   Ambulation w/ SPC   4 laps CGA at mirror          Gait Training                                       Modalities                                                    Merrick Waldron, PT  1/10/2025,2:46 PM  "

## 2025-01-15 ENCOUNTER — OFFICE VISIT (OUTPATIENT)
Dept: PHYSICAL THERAPY | Facility: CLINIC | Age: 60
End: 2025-01-15
Payer: MEDICARE

## 2025-01-15 DIAGNOSIS — Z96.651 STATUS POST REVISION OF TOTAL KNEE REPLACEMENT, RIGHT: Primary | ICD-10-CM

## 2025-01-15 DIAGNOSIS — R53.81 PHYSICAL DECONDITIONING: ICD-10-CM

## 2025-01-15 PROCEDURE — 97530 THERAPEUTIC ACTIVITIES: CPT

## 2025-01-15 PROCEDURE — 97110 THERAPEUTIC EXERCISES: CPT

## 2025-01-15 PROCEDURE — 97112 NEUROMUSCULAR REEDUCATION: CPT

## 2025-01-15 NOTE — PROGRESS NOTES
"Daily Note     Today's date: 1/15/2025  Patient name: Aaron Richards  : 1965  MRN: [id number]  Referring provider: Dionne Kuhn PA-C  Dx:   Encounter Diagnosis     ICD-10-CM    1. Status post revision of total knee replacement, right  Z96.651       2. Physical deconditioning  R53.81                      Subjective: Pt reports feeling good today.      Objective: See treatment diary below      Assessment: Tolerated treatment well. Able to increase distance during ambulation w/ SPC, pt able to complete with CGA and no LOB.  Discussed w/ pt usage of SPC for short household distances, pt verbalized understanding. Patient would benefit from continued PT      Plan: Continue per plan of care.      Precautions: R Transmetatarsal amputation ~5 years, s/p R TKA revision 24, DM type 2    POC expires Unit limit Auth  expiration date PT/OT + Visit Limit?   2/3/25 N/A  DOMN      Sec - 60 visits per auth           Visit/Unit Tracking  AUTH Status:  Date 1/8 1/10 1/15 11/25 11/27 12/2 12/4 12/18 12/19 12/26 12/30 1/3   N/A Used 13 14 15 4 5 6 7 8 9 10 11 12    Remaining                     Manuals 12/30 1/3 1/8 1/10 1/15 12/2 12/4 12/18 12/19 12/26   Reassessment          JK                                          Neuro Re-Ed             SLR 3 way      2x10 B/L FLEX 3#/ABD  2x10   B/L #1  flex only       Prone hamstring curls             Bridges w/ hip ABD      2x10 GTB  15x GTB Hip abd seated      Supine PPT                          LAQ w/ hold        5\" 2x10 B/L 5\" 3x10    Matrix flex/ext  3x10 ea 20# EXT, 30# FLEX 3x10 ea 25# EXT, 35# FLEX 3x10 30# ext 40# flex 3x10 30# ext 40# flex 2x10 ea 20# EXT, 30# FLEX 3x10 ea 20# EXT, 30# FLEX  3x10 ea 20# EXT, 30# FLEX    Standing 3 way hip  2x10 ABD/EXT 2x10 ABD/EXT  YTB Decl. today   X20 ea ABD/EXT      Seated hip ABD             Lateral walks             Seated marches         Seated hip add iso 5\" x30    Seated webslide: M, L rows             Seated MB Diagonal " "chops X20 ea YMB X20 ea YMB   X20 ea BMB     NV   FA EO firm   3x30'' 3x30\" 3x30\"        FT EO firm 4x30'' 4x30'' no UE assist, repeated attempts NV 3x30\" 3x30\"     NV   FT EC firm unable         NV             NV   Tandem stance 4x30'' 2 finger assist 4x30'' 2 finger assist NV 3x20\"      NV   TG squats  D/C    2x10 L22 2x10 L22 w/ eccentric control NV                               Ther Ex             Pt education          Biomechanics, AD usage   Nustep for cardio L2 10' L2 10' L2 10' 10' L2  10' L2 L1 10' L1 10' L1 10'  L1 x10'    STS    3x10          Supine hip flexor stretch w/ strap             Supine hamstring stretch w/ strap      3x30'' B/L 3x30'' B/L 3x30\" B/L Seated 30\"x3 ea    Seated hamstring stretch w/ strap                                       Ther Activity             Alt step taps          NV   Lateral walks   4 laps at mirror        NV   Ambulation w/ SPC   4 laps CGA at mirror  4 laps CGA length of gym        Gait Training                                       Modalities                                                      "

## 2025-01-17 ENCOUNTER — EVALUATION (OUTPATIENT)
Dept: PHYSICAL THERAPY | Facility: CLINIC | Age: 60
End: 2025-01-17
Payer: COMMERCIAL

## 2025-01-17 DIAGNOSIS — Z96.651 STATUS POST REVISION OF TOTAL KNEE REPLACEMENT, RIGHT: Primary | ICD-10-CM

## 2025-01-17 DIAGNOSIS — R53.81 PHYSICAL DECONDITIONING: ICD-10-CM

## 2025-01-17 PROCEDURE — 97140 MANUAL THERAPY 1/> REGIONS: CPT

## 2025-01-17 PROCEDURE — 97110 THERAPEUTIC EXERCISES: CPT

## 2025-01-17 NOTE — PROGRESS NOTES
Orthotic Evaluation    Today's date: 25  Patient name: Aaron Richards  : 1965  MRN: [id number]  Referring provider: Dionne Kuhn PA-C  Dx:   Encounter Diagnosis     ICD-10-CM    1. Status post revision of total knee replacement, right  Z96.651       2. Physical deconditioning  R53.81           Start Time: 1300  Stop Time: 1345  Total time in clinic (min): 45 minutes     Subjective:    Aaron presents today for orthotic evaluation. he complains of pain, decreased strength, decreased ROM, decreased sensation, ambulatory dysfunction, and poor fitting footwear due to amputation . The patient plans to use his orthotic for walking and standing. The orthotic will be placed in a wide, size 14 New Balance 608.    Objective:    Age: 59 y.o.  Weight: 240    Foot Posture Index Score    Right Foot - NA - Transmetatarsal amputation    Left Foot  Talar dome - -1  Talonavicular bulge - -1  Medial longitudinal arch - 0  Malleolar curve - 0   Calcaneal eversion - 0  Too many toes - -1  Total Score L = -3    Reference Values  Normal =    0 to +5  Pronated =  +6 to +9 Highly Pronated =  10+  Supinated =   -1 to -4 Highly Supinated = -5 to -12    Objective     Active Range of Motion   Left Ankle/Foot   Dorsiflexion (ke): 0 degrees   Plantar flexion: WFL  Inversion: 15 degrees   Eversion: 0 degrees     Right Ankle/Foot   Dorsiflexion (ke): 0 degrees   Plantar flexion: 15 degrees WFL  Inversion: 0 degrees   Eversion: 10 degrees     Strength/Myotome Testing     Left Ankle/Foot   Dorsiflexion: 2+  Plantar flexion: 4-  Inversion: 4-  Eversion: 4-    Right Ankle/Foot   Dorsiflexion: 2+  Plantar flexion: 3+      Assessment:    Patient requires custom foot orthosis with toe filler, deepened heel cup, and arch support to correct altered gait mechanics. Patient is not currently controlled by his current shoe wear. Patient was educated on the design of the custom orthotic and it's ability to offload his current  "pathology. Patient was also educated on potential shoe wear changes with device, break-in period, and skin checks to avoid potential skin break down.     Orthotic goals:  1) Patient will have custom foot orthoses fitted to his shoe.   2) Patient will be compliant with break-in period of custom foot orthoses as prescribed by PT.   3) Patient will be compliant with custom foot orthoses use as prescribed by PT.     Plan:    Planned therapy interventions: orthotic fitting/training  Duration in visits: 2       Precautions: R Transmetatarsal amputation ~5 years, s/p R TKA revision 9/19/24, DM type 2    POC expires Unit limit Auth  expiration date PT/OT + Visit Limit?   2/3/25 N/A  DOMN      Sec - 60 visits per auth           Visit/Unit Tracking  AUTH Status:  Date 1/8 1/10 1/15 1/17  12/2 12/4 12/18 12/19 12/26 12/30 1/3   N/A Used 13 14 15 16  6 7 8 9 10 11 12    Remaining                     Manuals 12/30 1/3 1/8 1/10 1/15 1/17  12/18 12/19 12/26   Reassessment          JK                Orthotic fitting      TS                    Neuro Re-Ed             SLR 3 way        2x10   B/L #1  flex only       Prone hamstring curls             Bridges w/ hip ABD        15x GTB Hip abd seated      Supine PPT                          LAQ w/ hold        5\" 2x10 B/L 5\" 3x10    Matrix flex/ext  3x10 ea 20# EXT, 30# FLEX 3x10 ea 25# EXT, 35# FLEX 3x10 30# ext 40# flex 3x10 30# ext 40# flex 3x10 30#  ext 40# flex   3x10 ea 20# EXT, 30# FLEX    Standing 3 way hip  2x10 ABD/EXT 2x10 ABD/EXT  YTB Decl. today         Seated hip ABD             Lateral walks             Seated marches         Seated hip add iso 5\" x30    Seated webslide: M, L rows             Seated MB Diagonal chops X20 ea YMB X20 ea YMB   X20 ea BMB     NV   FA EO firm   3x30'' 3x30\" 3x30\"        FT EO firm 4x30'' 4x30'' no UE assist, repeated attempts NV 3x30\" 3x30\"     NV   FT EC firm unable         NV             NV   Tandem stance 4x30'' 2 finger assist 4x30'' 2 " "finger assist NV 3x20\"      NV   TG squats  D/C      NV                               Ther Ex             Pt education          Biomechanics, AD usage   Nustep for cardio L2 10' L2 10' L2 10' 10' L2  10' L2   L1 10'  L1 x10'    STS    3x10          Supine hip flexor stretch w/ strap             Supine hamstring stretch w/ strap        3x30\" B/L Seated 30\"x3 ea    Seated hamstring stretch w/ strap                                       Ther Activity             Alt step taps          NV   Lateral walks   4 laps at mirror        NV   Ambulation w/ SPC   4 laps CGA at mirror  4 laps CGA length of gym        Gait Training                                       Modalities                                            "

## 2025-01-19 DIAGNOSIS — F32.A DEPRESSION, UNSPECIFIED DEPRESSION TYPE: ICD-10-CM

## 2025-01-19 RX ORDER — ESCITALOPRAM OXALATE 5 MG/1
5 TABLET ORAL DAILY
Qty: 30 TABLET | Refills: 0 | Status: SHIPPED | OUTPATIENT
Start: 2025-01-19

## 2025-01-22 ENCOUNTER — OFFICE VISIT (OUTPATIENT)
Dept: PHYSICAL THERAPY | Facility: CLINIC | Age: 60
End: 2025-01-22
Payer: MEDICARE

## 2025-01-22 DIAGNOSIS — Z96.651 STATUS POST REVISION OF TOTAL KNEE REPLACEMENT, RIGHT: Primary | ICD-10-CM

## 2025-01-22 DIAGNOSIS — R53.81 PHYSICAL DECONDITIONING: ICD-10-CM

## 2025-01-22 PROCEDURE — 97530 THERAPEUTIC ACTIVITIES: CPT

## 2025-01-22 PROCEDURE — 97110 THERAPEUTIC EXERCISES: CPT

## 2025-01-22 NOTE — PROGRESS NOTES
"Daily Note     Today's date: 2025  Patient name: Aaron Richards  : 1965  MRN: [id number]  Referring provider: Dionne Kuhn PA-C  Dx:   Encounter Diagnosis     ICD-10-CM    1. Status post revision of total knee replacement, right  Z96.651       2. Physical deconditioning  R53.81                      Subjective: Pt states his orthotics eval went well.      Objective: See treatment diary below      Assessment: Tolerated treatment well. Pt able to increase ambulation distance today with SPC and CGA with moderate increase in fatigue, pt did demonstrate 2 minor LOB however able to self correct with hip strategy.  PT will continue to progress as tolerated.  Patient would benefit from continued PT      Plan: Continue per plan of care.      Precautions: R Transmetatarsal amputation ~5 years, s/p R TKA revision 24, DM type 2    POC expires Unit limit Auth  expiration date PT/OT + Visit Limit?   2/3/25 N/A  DOMN      Sec - 60 visits per auth           Visit/Unit Tracking  AUTH Status:  Date 1/8 1/10 1/15 1/17 1/22  12/4 12/18 12/19 12/26 12/30 1/3   N/A Used 13 14 15 16 17  7 8 9 10 11 12    Remaining                     Manuals 12/30 1/3 1/8 1/10 1/15 1/17 1/22  12/19 12/26   Reassessment          JK                Orthotic fitting      TS                    Neuro Re-Ed             SLR 3 way             Prone hamstring curls             Bridges w/ hip ABD         Hip abd seated      Supine PPT                          LAQ w/ hold         5\" 3x10    Matrix flex/ext  3x10 ea 20# EXT, 30# FLEX 3x10 ea 25# EXT, 35# FLEX 3x10 30# ext 40# flex 3x10 30# ext 40# flex 3x10 30#  ext 40# flex   3x10 ea 20# EXT, 30# FLEX    Standing 3 way hip  2x10 ABD/EXT 2x10 ABD/EXT  YTB Decl. today         Seated hip ABD             Lateral walks             Seated marches         Seated hip add iso 5\" x30    Seated webslide: M, L rows             Seated MB Diagonal chops X20 ea YMB X20 ea YMB   X20 ea BMB  X20 ea BMB   NV " He found out that his medication is going to cost him $2400 per month  He can get it from the [place], but says he has a bunch of hoops to go thru  He would like help gathering all the information--medical records of his treatments, cancer, etc that the [place] oncologist needs to be able to get the Nexavar  He needs things faxed to: [phone]  For the [place], his last 4 of SS needs to be included--[phone]  Call if you have questions about what is needed  He is going out of town on the 28th and hopes to get this information early next week  He also needed Dr Zeinab Simpson number to be able to change the appointment with her  Given  "  FA EO firm   3x30'' 3x30\" 3x30\"        FT EO firm 4x30'' 4x30'' no UE assist, repeated attempts NV 3x30\" 3x30\"     NV   FT EC firm unable         NV             NV   Tandem stance 4x30'' 2 finger assist 4x30'' 2 finger assist NV 3x20\"      NV   TG squats  D/C                                     Ther Ex             Pt education          Biomechanics, AD usage   Nustep for cardio L2 10' L2 10' L2 10' 10' L2  10' L2  10' L2   L1 x10'    STS    3x10          Supine hip flexor stretch w/ strap             Supine hamstring stretch w/ strap         Seated 30\"x3 ea    Seated hamstring stretch w/ strap                                       Ther Activity             Alt step taps          NV   Lateral walks   4 laps at mirror     3 laps x 2 RTB   NV   Ambulation w/ SPC   4 laps CGA at mirror  4 laps CGA length of gym  80ft x 4 SPC CGA      Gait Training                                       Modalities                                            "

## 2025-01-24 ENCOUNTER — OFFICE VISIT (OUTPATIENT)
Dept: PHYSICAL THERAPY | Facility: CLINIC | Age: 60
End: 2025-01-24
Payer: MEDICARE

## 2025-01-24 DIAGNOSIS — R53.81 PHYSICAL DECONDITIONING: ICD-10-CM

## 2025-01-24 DIAGNOSIS — Z96.651 STATUS POST REVISION OF TOTAL KNEE REPLACEMENT, RIGHT: Primary | ICD-10-CM

## 2025-01-24 PROCEDURE — 97110 THERAPEUTIC EXERCISES: CPT

## 2025-01-24 PROCEDURE — 97112 NEUROMUSCULAR REEDUCATION: CPT

## 2025-01-24 NOTE — PROGRESS NOTES
Daily Note    Today's date: 25  Patient name: Aaron Richards  : 1965  MRN: [id number]  Referring provider: Dionne Kuhn PA-C  Dx:   Encounter Diagnosis     ICD-10-CM    1. Status post revision of total knee replacement, right  Z96.651       2. Physical deconditioning  R53.81           Start Time: 1300  Stop Time: 1345  Total time in clinic (min): 45 minutes      Subjective: Aaron reports slight soreness following previous session, but feels like he is improving overall. He notes adherence to HEP and is going to order a dorsiflexion lace for walking assistance.    Objective: See treatment diary below.    Assessment: Aaron tolerated treatment well with consistent cuing throughout. TE's were performed with close contact guard and a gait belt during ambulation with SPC. No new TE's were performed today. Following treatment, the patient demonstrated fatigue and would benefit from continued physical therapy.    Plan: Continue per plan of care.        Precautions: R Transmetatarsal amputation ~5 years, s/p R TKA revision 24, DM type 2    POC expires Unit limit Auth  expiration date PT/OT + Visit Limit?   2/3/25 N/A  DOMN      Sec - 60 visits per auth           Visit/Unit Tracking  AUTH Status:  Date 1/8 1/10 1/15 1/17 1/22 1/24  12/18 12/19 12/26 12/30 1/3   N/A Used 13 14 15 16 17 28  8 9 10 11 12    Remaining                     Manuals 12/30 1/3 1/8 1/10 1/15 1/17 1/22 1/24  12/26   Reassessment          JK                Orthotic fitting      TS                    Neuro Re-Ed             SLR 3 way             Prone hamstring curls             Bridges w/ hip ABD             Supine PPT                          LAQ w/ hold             Matrix flex/ext  3x10 ea 20# EXT, 30# FLEX 3x10 ea 25# EXT, 35# FLEX 3x10 30# ext 40# flex 3x10 30# ext 40# flex 3x10 30#  ext 40# flex       Standing 3 way hip  2x10 ABD/EXT 2x10 ABD/EXT  YTB Decl. today     3x10      Seated hip ABD             Lateral  "walks             Seated marches             Seated webslide: M, L rows             Seated MB Diagonal chops X20 ea YMB X20 ea YMB   X20 ea BMB  X20 BMB X20 BMB  NV   FA EO firm   3x30'' 3x30\" 3x30\"   3x30\"     FT EO firm 4x30'' 4x30'' no UE assist, repeated attempts NV 3x30\" 3x30\"     NV   FT EC firm unable         NV             NV   Tandem stance 4x30'' 2 finger assist 4x30'' 2 finger assist NV 3x20\"      NV   TG squats  D/C                                     Ther Ex             Pt education          Biomechanics, AD usage   Nustep for cardio L2 10' L2 10' L2 10' 10' L2  10' L2  10' L2 10' L6      STS    3x10          Supine hip flexor stretch w/ strap             Supine hamstring stretch w/ strap             Seated hamstring stretch w/ strap                                       Ther Activity             Alt step taps          NV   Lateral walks   4 laps at mirror     3 laps x 2 RTB   NV   Ambulation w/ SPC   4 laps CGA at mirror  4 laps CGA length of gym  80ft x 4 SPC CGA 80ft x 4 SPC(L) CGA w/ Gait belt     Gait Training                                       Modalities                                            Merrick Waldron, PT  1/24/2025,7:22 PM  "

## 2025-01-29 ENCOUNTER — APPOINTMENT (OUTPATIENT)
Dept: PHYSICAL THERAPY | Facility: CLINIC | Age: 60
End: 2025-01-29
Payer: MEDICARE

## 2025-01-29 NOTE — PROGRESS NOTES
{SL AMB PT/OT NOTE TYPE:42148}    Today's date: 2025  Patient name: Aaron Richards  : 1965  MRN: [id number]  Referring provider: Dionne Kuhn PA-C  Dx: No diagnosis found.               Assessment  Understanding of Dx/Px/POC: good     Prognosis: good    Goals  STG to be achieved by 4 weeks  -Pt will be independent with basic HEP.  MET  -Pt will improve MMT scores by 1/2 grade in all deficient planes in order to facilitate ease of functional activity.  MET  -Pt will improve ROM by 25% in all deficient planes in order to improve functional mobility.  ONGOING  -Pt will improve 5xSTS score by 2 seconds in order to demonstrate increased functional strength.  MET  -Pt will improve TUG score by 2 seconds in order to demonstrate improved functional mobility and decreased risk of falls.  MET    LTG to be achieved by Discharge  -Pt will be independent with comprehensive HEP.  PROGRESS  -Pt will improve MMT scores to WFL in all deficient planes in order to facilitate ease of functional activity.  PROGRESS  -Pt will improve ROM to WFL in all deficient planes in order to improve functional mobility.  ONGOING  -Pt will report 0/10 at worst in order to demonstrate return to PLOF.  ONGOING  -Pt will report no pain with usual ADLs. MET  -Pt will improve 5xSTS score by 4 seconds in order to demonstrate increased functional strength.  MET  -Pt will improve TUG score by 4 seconds in order to demonstrate improved functional mobility and decreased risk of falls.  MET  -Pt will improve roblero balance score from 19/56 to 30/56 in order to demonstrate increased postural stability during functional activity.         Plan  Patient would benefit from: skilled physical therapy  Referral necessary: No    Planned therapy interventions: flexibility, functional ROM exercises, joint mobilization, manual therapy, neuromuscular re-education, patient/caregiver education, sensory integrative techniques, strengthening, stretching,  therapeutic activities, therapeutic exercise, balance, balance/weight bearing training and home exercise program    Frequency: 2x week  Plan of Care beginning date: 2024  Plan of Care expiration date: 3/20/2025  Treatment plan discussed with: patient    Subjective Evaluation    History of Present Illness  Mechanism of injury:          Patient Goals  Patient goals for therapy: improved balance, decreased pain and increased strength    Pain  Current pain ratin  At best pain ratin  At worst pain ratin    Social Support  Steps to enter house: yes  4  Lives in: one-story house  Lives with: spouse    Employment status: not working    Diagnostic Tests  X-ray: normal  Treatments  Previous treatment: physical therapy    Objective     Active Range of Motion   Left Hip   Normal active range of motion    Right Hip   Normal active range of motion  Left Knee   Prone flexion: 120 degrees   Extension: -5 degrees     Right Knee   Prone flexion: 111 degrees   Extension: -5 degrees   Left Ankle/Foot   Dorsiflexion (kf): 3 degrees   Plantar flexion: 16 degrees     Right Ankle/Foot   Dorsiflexion (kf): 0 degrees   Plantar flexion: 10 degrees     Passive Range of Motion   Left Knee   Prone flexion: 135 degrees     Right Knee   Prone flexion: 120 degrees     Right Ankle/Foot    Dorsiflexion (kf): 10 degrees    Plantar flexion: 18 degrees     Strength/Myotome Testing     Left Hip   Planes of Motion   Flexion: 4+  Abduction: 5  Adduction: 4+    Right Hip   Planes of Motion   Flexion: 4+  Abduction: 5  Adduction: 4+    Left Knee   Flexion: 5  Extension: 5    Right Knee   Flexion: 5  Extension: 5    Left Ankle/Foot   Dorsiflexion: 3-    Right Ankle/Foot   Dorsiflexion: 2+    Ambulation     Observational Gait   Right foot contact pattern: foot flat    Additional Observational Gait Details  Right foot drop    Functional Assessment        Comments  Functional tests and measures 2024:  5xSTS: 13.17 w/ UE assist  TU.31  "w/ RW  HAMILTON/56           Precautions: R Transmetatarsal amputation ~5 years, s/p R TKA revision 24, DM type 2    POC expires Unit limit Auth  expiration date PT/OT + Visit Limit?   2/3/25 N/A  DOMN      Sec - 60 visits per auth           Visit/Unit Tracking  AUTH Status:  Date 1/8 1/10 1/15 1/17 1/22 1/24  12/18 12/19 12/26 12/30 1/3   N/A Used 13 14 15 16 17 28  8 9 10 11 12    Remaining                     Manuals 12/30 1/3 1/8 1/10 1/15 1/17 1/22 1/24  12/26   Reassessment          JK                Orthotic fitting      TS                    Neuro Re-Ed             SLR 3 way             Prone hamstring curls             Bridges w/ hip ABD             Supine PPT                          LAQ w/ hold             Matrix flex/ext  3x10 ea 20# EXT, 30# FLEX 3x10 ea 25# EXT, 35# FLEX 3x10 30# ext 40# flex 3x10 30# ext 40# flex 3x10 30#  ext 40# flex       Standing 3 way hip  2x10 ABD/EXT 2x10 ABD/EXT  YTB Decl. today     3x10      Seated hip ABD             Lateral walks             Seated marches             Seated webslide: M, L rows             Seated MB Diagonal chops X20 ea YMB X20 ea YMB   X20 ea BMB  X20 BMB X20 BMB  NV   FA EO firm   3x30'' 3x30\" 3x30\"   3x30\"     FT EO firm 4x30'' 4x30'' no UE assist, repeated attempts NV 3x30\" 3x30\"     NV   FT EC firm unable         NV             NV   Tandem stance 4x30'' 2 finger assist 4x30'' 2 finger assist NV 3x20\"      NV   TG squats  D/C                                     Ther Ex             Pt education          Biomechanics, AD usage   Nustep for cardio L2 10' L2 10' L2 10' 10' L2  10' L2  10' L2 10' L6      STS    3x10          Supine hip flexor stretch w/ strap             Supine hamstring stretch w/ strap             Seated hamstring stretch w/ strap                                       Ther Activity             Alt step taps          NV   Lateral walks   4 laps at mirror     3 laps x 2 RTB   NV   Ambulation w/ SPC   4 laps CGA at mirror  4 laps CGA " length of gym  80ft x 4 SPC CGA 80ft x 4 SPC(L) CGA w/ Gait belt     Gait Training                                       Modalities

## 2025-01-31 ENCOUNTER — EVALUATION (OUTPATIENT)
Dept: PHYSICAL THERAPY | Facility: CLINIC | Age: 60
End: 2025-01-31
Payer: MEDICARE

## 2025-01-31 DIAGNOSIS — R53.81 PHYSICAL DECONDITIONING: ICD-10-CM

## 2025-01-31 DIAGNOSIS — Z96.651 STATUS POST REVISION OF TOTAL KNEE REPLACEMENT, RIGHT: Primary | ICD-10-CM

## 2025-01-31 PROCEDURE — 97140 MANUAL THERAPY 1/> REGIONS: CPT

## 2025-01-31 PROCEDURE — 97110 THERAPEUTIC EXERCISES: CPT

## 2025-01-31 NOTE — PROGRESS NOTES
PT Re-Evaluation     Today's date: 2025  Patient name: Araon Richards  : 1965  MRN: [id number]  Referring provider: Dionne Kuhn PA-C  Dx:   Encounter Diagnosis     ICD-10-CM    1. Status post revision of total knee replacement, right  Z96.651       2. Physical deconditioning  R53.81           Start Time: 1300  Stop Time: 1345  Total time in clinic (min): 45 minutes    Assessment    Assessment details: Pt presents today for reassessment.  Prior to assessment pt reporting 40% improvement in strength and function since initiation of physical therapy.  Manual muscle testing reveals considerable gains in lower extremity strength across all previously tested muscle groups.  Functional tests and measures reveal notable improvements in 5xSTS from 16.65s to 13.17s, now at 10.4 seconds, as well as TUG score from 24.01s to 17.31s.  Pt able to complete HAMILTON balance test today with result of 19/56 unchanged from last month. Despite improvements, pt continues to remain limited in functional capacity per previously mentioned tests and measures as compared to their peers, would benefit from continued intervention in order to further improve functional strength and endurance while initiating interventions focused on restoring balance and proprioception in order to improve ease of daily activity, decrease risk for falls, and improve overall quality of life.   Understanding of Dx/Px/POC: good     Prognosis: good    Goals  STG to be achieved by 4 weeks  -Pt will be independent with basic HEP.  MET  -Pt will improve MMT scores by 1/2 grade in all deficient planes in order to facilitate ease of functional activity.  MET  -Pt will improve ROM by 25% in all deficient planes in order to improve functional mobility.  - GOAL MET   -Pt will improve 5xSTS score by 2 seconds in order to demonstrate increased functional strength.  MET  -Pt will improve TUG score by 2 seconds in order to demonstrate improved functional  mobility and decreased risk of falls.  MET    LTG to be achieved by Discharge  -Pt will be independent with comprehensive HEP.  PROGRESS  -Pt will improve MMT scores to WFL in all deficient planes in order to facilitate ease of functional activity.  - GOAL MET   -Pt will improve ROM to WFL in all deficient planes in order to improve functional mobility.  ONGOING  -Pt will report 0/10 at worst in order to demonstrate return to PLOF.  ONGOING  -Pt will report no pain with usual ADLs. MET  -Pt will improve 5xSTS score by 4 seconds in order to demonstrate increased functional strength.  MET  -Pt will improve TUG score by 4 seconds in order to demonstrate improved functional mobility and decreased risk of falls.  MET  -Pt will improve roblero balance score from 19/56 to 30/56 in order to demonstrate increased postural stability during functional activity. - GOAL NOT MET          Plan  Patient would benefit from: skilled physical therapy  Referral necessary: No    Planned therapy interventions: flexibility, functional ROM exercises, joint mobilization, manual therapy, neuromuscular re-education, patient/caregiver education, sensory integrative techniques, strengthening, stretching, therapeutic activities, therapeutic exercise, balance, balance/weight bearing training and home exercise program    Frequency: 2x week  Plan of Care beginning date: 1/31/2025  Plan of Care expiration date: 3/28/2025  Treatment plan discussed with: patient        Subjective Evaluation    History of Present Illness  Mechanism of injury: Aaron reports that he is feeling better since starting therapy 2 months ago. The patient feels 40% improved overall.    he notes some difficulty with his usual tasks including walking with his walker.     he notes 1/10 pain at this time.     he notes adherence to HEP and would like to continue with therapy with a focus on balance and walking.  Patient Goals  Patient goals for therapy: improved balance,  decreased pain and increased strength    Pain  Current pain ratin  At best pain ratin  At worst pain ratin    Social Support  Steps to enter house: yes  4  Lives in: one-story house  Lives with: spouse    Employment status: not working    Diagnostic Tests  X-ray: normal  Treatments  Previous treatment: physical therapy      Objective     Active Range of Motion   Left Hip   Normal active range of motion    Right Hip   Normal active range of motion  Left Knee   Prone flexion: 150 degrees   Extension: -5 degrees     Right Knee   Prone flexion: 130 degrees   Extension: -2 degrees     Passive Range of Motion   Left Knee   Prone flexion: 135 degrees     Right Knee   Prone flexion: 120 degrees     Strength/Myotome Testing     Left Hip   Planes of Motion   Flexion: 4+  Abduction: 5  Adduction: 4+    Right Hip   Planes of Motion   Flexion: 4+  Abduction: 5  Adduction: 4+    Left Knee   Flexion: 5  Extension: 5    Right Knee   Flexion: 5  Extension: 5    Left Ankle/Foot   Dorsiflexion: 3-    Right Ankle/Foot   Dorsiflexion: 2+    Ambulation     Comments   Ambulating with a rolling walker.    Functional Assessment        Comments  Functional tests and measures 2024:  5xSTS: 13.17 w/ UE assist  TU.31 w/ RW  HAMILTON2025  5xSTS - 10.3s w/ UE assist  TUG - 17.5s w/ RW  HAMILTON -                Precautions: R Transmetatarsal amputation ~5 years, s/p R TKA revision 24, DM type 2    POC expires Unit limit Auth  expiration date PT/OT + Visit Limit?   3/28/25 N/A  DOMN      Sec - 60 visits per auth           Visit/Unit Tracking  AUTH Status:  Date 1/8 1/10 1/15 1/17 1/22 1/24 1/31  12/19 12/26 12/30 1/3   N/A Used 13 14 15 16 17 28 19  9 10 11 12    Remaining                     Manuals 12/30 1/3 1/8 1/10 1/15 1/17 1/22 1/24 1/31    Reassessment         TS                 Orthotic fitting      TS                    Neuro Re-Ed             SLR 3 way             Prone hamstring curls            "  Bridges w/ hip ABD             Supine PPT                          LAQ w/ hold             Matrix flex/ext  3x10 ea 20# EXT, 30# FLEX 3x10 ea 25# EXT, 35# FLEX 3x10 30# ext 40# flex 3x10 30# ext 40# flex 3x10 30#  ext 40# flex       Standing 3 way hip  2x10 ABD/EXT 2x10 ABD/EXT  YTB Decl. today     3x10      Seated hip ABD             Lateral walks             Seated marches             Seated webslide: M, L rows             Seated MB Diagonal chops X20 ea YMB X20 ea YMB   X20 ea BMB  X20 BMB X20 BMB     FA EO firm   3x30'' 3x30\" 3x30\"   3x30\"     FT EO firm 4x30'' 4x30'' no UE assist, repeated attempts NV 3x30\" 3x30\"        FT EC firm unable                         Tandem stance 4x30'' 2 finger assist 4x30'' 2 finger assist NV 3x20\"         TG squats  D/C                        HAMILTON, TUG, 5xSTS         TS    Ther Ex             Pt education             Nustep for cardio L2 10' L2 10' L2 10' 10' L2  10' L2  10' L2 10' L6  10' L1     STS    3x10          Supine hip flexor stretch w/ strap             Supine hamstring stretch w/ strap             Seated hamstring stretch w/ strap                                       Ther Activity             Alt step taps             Lateral walks   4 laps at mirror     3 laps x 2 RTB      Ambulation w/ SPC   4 laps CGA at mirror  4 laps CGA length of gym  80ft x 4 SPC CGA 80ft x 4 SPC(L) CGA w/ Gait belt     Gait Training                                       Modalities                                              "

## 2025-02-04 DIAGNOSIS — Z79.4 TYPE 2 DIABETES MELLITUS WITH DIABETIC POLYNEUROPATHY, WITH LONG-TERM CURRENT USE OF INSULIN (HCC): ICD-10-CM

## 2025-02-04 DIAGNOSIS — E11.42 TYPE 2 DIABETES MELLITUS WITH DIABETIC POLYNEUROPATHY, WITH LONG-TERM CURRENT USE OF INSULIN (HCC): ICD-10-CM

## 2025-02-04 RX ORDER — INSULIN GLARGINE 100 [IU]/ML
45 INJECTION, SOLUTION SUBCUTANEOUS 2 TIMES DAILY
Qty: 90 ML | Refills: 1 | Status: SHIPPED | OUTPATIENT
Start: 2025-02-04

## 2025-02-05 ENCOUNTER — APPOINTMENT (OUTPATIENT)
Dept: PHYSICAL THERAPY | Facility: CLINIC | Age: 60
End: 2025-02-05
Payer: MEDICARE

## 2025-02-06 ENCOUNTER — APPOINTMENT (OUTPATIENT)
Dept: PHYSICAL THERAPY | Facility: CLINIC | Age: 60
End: 2025-02-06
Payer: MEDICARE

## 2025-02-07 ENCOUNTER — OFFICE VISIT (OUTPATIENT)
Dept: PHYSICAL THERAPY | Facility: CLINIC | Age: 60
End: 2025-02-07
Payer: MEDICARE

## 2025-02-07 DIAGNOSIS — Z96.651 STATUS POST REVISION OF TOTAL KNEE REPLACEMENT, RIGHT: Primary | ICD-10-CM

## 2025-02-07 DIAGNOSIS — R53.81 PHYSICAL DECONDITIONING: ICD-10-CM

## 2025-02-07 PROCEDURE — 97112 NEUROMUSCULAR REEDUCATION: CPT

## 2025-02-07 PROCEDURE — 97110 THERAPEUTIC EXERCISES: CPT

## 2025-02-07 PROCEDURE — 97530 THERAPEUTIC ACTIVITIES: CPT

## 2025-02-07 NOTE — PROGRESS NOTES
"Daily Note     Today's date: 2025  Patient name: Aaron Richards  : 1965  MRN: [id number]  Referring provider: Dionne Kuhn PA-C  Dx:   Encounter Diagnosis     ICD-10-CM    1. Status post revision of total knee replacement, right  Z96.651       2. Physical deconditioning  R53.81                      Subjective: Pt states he went with his wife to a doctor's appt this past Tuesday and was able to walk \"more than he has in the past 3 years.\"      Objective: See treatment diary below      Assessment: Tolerated treatment well. Resumed balance interventions alongside functional strengthening, implemented sidestepping w/ 1 UE assist for increased postural stability during dynamic movement.  Patient would benefit from continued PT      Plan: Continue per plan of care.      Precautions: R Transmetatarsal amputation ~5 years, s/p R TKA revision 24, DM type 2    POC expires Unit limit Auth  expiration date PT/OT + Visit Limit?   3/28/25 N/A  DOMN      Sec - 60 visits per auth           Visit/Unit Tracking  AUTH Status:  Date 1/8 1/10 1/15 1/17 1/22 1/24 1/31 2/7  12/26 12/30 1/3   N/A Used 13 14 15 16 17 18 19 20  10 11 12    Remaining                     Manuals 12/30 1/3 1/8 1/10 1/15 1/17 1/22 1/24 1/31 2/7   Reassessment         TS                 Orthotic fitting      TS                    Neuro Re-Ed             SLR 3 way             Prone hamstring curls             Bridges w/ hip ABD             Supine PPT                          LAQ w/ hold             Matrix flex/ext  3x10 ea 20# EXT, 30# FLEX 3x10 ea 25# EXT, 35# FLEX 3x10 30# ext 40# flex 3x10 30# ext 40# flex 3x10 30#  ext 40# flex    3x10 30#  ext 40# flex   Standing 3 way hip  2x10 ABD/EXT 2x10 ABD/EXT  YTB Decl. today     3x10      Seated hip ABD             Lateral walks             Seated marches             Seated webslide: M, L rows             Seated MB Diagonal chops X20 ea YMB X20 ea YMB   X20 ea BMB  X20 BMB X20 BMB     FA EO " "firm   3x30'' 3x30\" 3x30\"   3x30\"     FT EO firm 4x30'' 4x30'' no UE assist, repeated attempts NV 3x30\" 3x30\"        FT EC firm unable            Tandem walking             Sidestepping on firm          2 laps 1 UE    Biodex: A/P WS          Unable                Tandem stance 4x30'' 2 finger assist 4x30'' 2 finger assist NV 3x20\"      3x20''   TG squats  D/C                        HAMILTON, RONDA, 5xSTS         TS    Ther Ex             Pt education          SPC vs. RW AD usage   Nustep for cardio L2 10' L2 10' L2 10' 10' L2  10' L2  10' L2 10' L6  10' L1  10' L4   STS    3x10          Supine hip flexor stretch w/ strap             Supine hamstring stretch w/ strap             Seated hamstring stretch w/ strap                                       Ther Activity             Alt step taps             Lateral walks   4 laps at mirror     3 laps x 2 RTB      Ambulation w/ SPC   4 laps CGA at mirror  4 laps CGA length of gym  80ft x 4 SPC CGA 80ft x 4 SPC(L) CGA w/ Gait belt  2 laps length of gym CGA w/ gait belt   Gait Training                                       Modalities                                              "

## 2025-02-10 DIAGNOSIS — F32.A DEPRESSION, UNSPECIFIED DEPRESSION TYPE: ICD-10-CM

## 2025-02-10 RX ORDER — ESCITALOPRAM OXALATE 5 MG/1
5 TABLET ORAL DAILY
Qty: 30 TABLET | Refills: 0 | Status: SHIPPED | OUTPATIENT
Start: 2025-02-10

## 2025-02-10 NOTE — TELEPHONE ENCOUNTER
Patient needs a refill of the following medication:    escitalopram (LEXAPRO) 5 mg tablet   Take 1 tablet (5 mg total) by mouth daily     Medication can be sent to verified pharmacy on file.    Patient appointment 3/25.    Please advise, thank you.

## 2025-02-12 ENCOUNTER — OFFICE VISIT (OUTPATIENT)
Dept: PHYSICAL THERAPY | Facility: CLINIC | Age: 60
End: 2025-02-12
Payer: MEDICARE

## 2025-02-12 DIAGNOSIS — R53.81 PHYSICAL DECONDITIONING: ICD-10-CM

## 2025-02-12 DIAGNOSIS — Z96.651 STATUS POST REVISION OF TOTAL KNEE REPLACEMENT, RIGHT: Primary | ICD-10-CM

## 2025-02-12 PROCEDURE — 97112 NEUROMUSCULAR REEDUCATION: CPT

## 2025-02-12 PROCEDURE — 97110 THERAPEUTIC EXERCISES: CPT

## 2025-02-12 NOTE — PROGRESS NOTES
Daily Note     Today's date: 2025  Patient name: Aaron Richards  : 1965  MRN: [id number]  Referring provider: Dionne Kuhn PA-C  Dx:   Encounter Diagnosis     ICD-10-CM    1. Status post revision of total knee replacement, right  Z96.651       2. Physical deconditioning  R53.81                      Subjective: Pt reports no significant change in status since last visit.      Objective: See treatment diary below      Assessment: Tolerated treatment well. Implemented sidestepping at hurdles and sidestepping on foam to increase balance, proprioception, and glute med activation during dynamic movement.  Pt able to complete with intermittent rest breaks for fatigue and CGA.  Patient would benefit from continued PT      Plan: Continue per plan of care.      Precautions: R Transmetatarsal amputation ~5 years, s/p R TKA revision 24, DM type 2    POC expires Unit limit Auth  expiration date PT/OT + Visit Limit?   3/28/25 N/A  DOMN      Sec - 60 visits per auth           Visit/Unit Tracking  AUTH Status:  Date 1/8 1/10 1/15 1/17 1/22 1/24 1/31 2/7 2/12 12/26 12/30 1/3   N/A Used 13 14 15 16 17 18 19 20 21 10 11 12    Remaining                     Manuals 2/12 1/3 1/8 1/10 1/15 1/17 1/22 1/24 1/31 2/7   Reassessment         TS                 Orthotic fitting      TS                    Neuro Re-Ed             SLR 3 way             Prone hamstring curls             Bridges w/ hip ABD             Supine PPT                          LAQ w/ hold             Matrix flex/ext 3x10 30#  ext 40# flex 3x10 ea 20# EXT, 30# FLEX 3x10 ea 25# EXT, 35# FLEX 3x10 30# ext 40# flex 3x10 30# ext 40# flex 3x10 30#  ext 40# flex    3x10 30#  ext 40# flex   Standing 3 way hip  2x10 ABD/EXT 2x10 ABD/EXT  YTB Decl. today     3x10      Seated hip ABD             Lateral walks             Seated marches             Seated webslide: M, L rows             Seated MB Diagonal chops  X20 ea YMB   X20 ea BMB  X20 BMB X20 BMB     FA  "EO firm   3x30'' 3x30\" 3x30\"   3x30\"     FT EO firm  4x30'' no UE assist, repeated attempts NV 3x30\" 3x30\"        FT EC firm             Tandem walking             Sidestepping on firm 3 laps x2 w/ hurdles CGA         2 laps 1 UE    Sidestepping on foam 3 laps CGA            Rockerboard A/P 2/2' CGA            Biodex: A/P WS          Unable                Tandem stance  4x30'' 2 finger assist NV 3x20\"      3x20''   TG squats  D/C                        HAMILTON, TUG, 5xSTS         TS    Ther Ex             Pt education          SPC vs. RW AD usage   Nustep for cardio 10' L4 L2 10' L2 10' 10' L2  10' L2  10' L2 10' L6  10' L1  10' L4   STS    3x10          Supine hip flexor stretch w/ strap             Supine hamstring stretch w/ strap             Seated hamstring stretch w/ strap                                       Ther Activity             Alt step taps             Lateral walks   4 laps at mirror     3 laps x 2 RTB      Ambulation w/ SPC 2 laps length of gym CGA w/ gait belt  4 laps CGA at mirror  4 laps CGA length of gym  80ft x 4 SPC CGA 80ft x 4 SPC(L) CGA w/ Gait belt  2 laps length of gym CGA w/ gait belt   Gait Training                                       Modalities                                                "

## 2025-02-14 ENCOUNTER — OFFICE VISIT (OUTPATIENT)
Dept: PHYSICAL THERAPY | Facility: CLINIC | Age: 60
End: 2025-02-14
Payer: MEDICARE

## 2025-02-14 DIAGNOSIS — Z96.651 STATUS POST REVISION OF TOTAL KNEE REPLACEMENT, RIGHT: Primary | ICD-10-CM

## 2025-02-14 DIAGNOSIS — R53.81 PHYSICAL DECONDITIONING: ICD-10-CM

## 2025-02-14 PROCEDURE — 97110 THERAPEUTIC EXERCISES: CPT

## 2025-02-14 PROCEDURE — 97530 THERAPEUTIC ACTIVITIES: CPT

## 2025-02-14 PROCEDURE — 97112 NEUROMUSCULAR REEDUCATION: CPT

## 2025-02-14 NOTE — PROGRESS NOTES
Daily Note    Today's date: 25  Patient name: Aaron Richards  : 1965  MRN: [id number]  Referring provider: Dionne Kuhn PA-C  Dx:   Encounter Diagnosis     ICD-10-CM    1. Status post revision of total knee replacement, right  Z96.651       2. Physical deconditioning  R53.81           Start Time: 1345  Stop Time: 1430  Total time in clinic (min): 45 minutes      Subjective: Aaron reports increased soreness following previous session, especially in the calves.    Objective: See treatment diary below.    Assessment: Aaron tolerated treatment well with consistent cuing throughout. TE's were performed with the same resistance and reps. No new TE's were performed today. All standing exercise was performed with gait belt and contact guard. Following treatment, the patient demonstrated fatigue and would benefit from continued physical therapy.    Plan: Continue per plan of care.  Progress treatment as tolerated.         Precautions: R Transmetatarsal amputation ~5 years, s/p R TKA revision 24, DM type 2    POC expires Unit limit Auth  expiration date PT/OT + Visit Limit?   3/28/25 N/A  DOMN      Sec - 60 visits per auth           Visit/Unit Tracking  AUTH Status:  Date 1/8 1/10 1/15 1/17 1/22 1/24 1/31 2/7 2/12 2/14  1/3   N/A Used 13 14 15 16 17 18 19 20 21 22  12    Remaining                     Manuals 2/12 2/14  1/10 1/15 1/17 1/22 1/24 1/31 27   Reassessment         TS                 Orthotic fitting      TS                    Neuro Re-Ed             SLR 3 way             Prone hamstring curls             Bridges w/ hip ABD             Supine PPT                          LAQ w/ hold             Matrix flex/ext 3x10 30#  ext 40# flex 3x15 30# ext 40# flex  3x10 30# ext 40# flex 3x10 30# ext 40# flex 3x10 30#  ext 40# flex    3x10 30#  ext 40# flex   Standing 3 way hip    Decl. today     3x10      Seated hip ABD             Lateral walks             Seated william            "  Seated webslide: M, L rows             Seated MB Diagonal chops     X20 ea BMB  X20 BMB X20 BMB     FA EO firm    3x30\" 3x30\"   3x30\"     FT EO firm    3x30\" 3x30\"        FT EC firm             Tandem walking             Sidestepping on firm 3 laps x2 w/ hurdles CGA 3 laps w/ hurdles        2 laps 1 UE    Renee walking  3 laps CGA           Sidestepping on foam 3 laps CGA            Rockerboard A/P 2/2' CGA            Biodex: A/P WS          Unable                Tandem stance    3x20\"      3x20''   TG squats                          HAMILTON, TUG, 5xSTS         TS    Ther Ex             Pt education          SPC vs. RW AD usage   Nustep for cardio 10' L4 10' L6   10' L2  10' L2  10' L2 10' L6  10' L1  10' L4   STS    3x10          Supine hip flexor stretch w/ strap             Supine hamstring stretch w/ strap             Seated hamstring stretch w/ strap                                       Ther Activity             Alt step taps             Lateral walks       3 laps x 2 RTB      Ambulation w/ SPC 2 laps length of gym CGA w/ gait belt 4 laps gym CGA gait belt   4 laps CGA length of gym  80ft x 4 SPC CGA 80ft x 4 SPC(L) CGA w/ Gait belt  2 laps length of gym CGA w/ gait belt   Gait Training                                       Modalities                                                Merrick Waldron, PT  2/14/2025,2:41 PM  "

## 2025-02-19 ENCOUNTER — OFFICE VISIT (OUTPATIENT)
Dept: PHYSICAL THERAPY | Facility: CLINIC | Age: 60
End: 2025-02-19
Payer: MEDICARE

## 2025-02-19 DIAGNOSIS — R53.81 PHYSICAL DECONDITIONING: ICD-10-CM

## 2025-02-19 DIAGNOSIS — Z96.651 STATUS POST REVISION OF TOTAL KNEE REPLACEMENT, RIGHT: Primary | ICD-10-CM

## 2025-02-19 PROCEDURE — 97110 THERAPEUTIC EXERCISES: CPT

## 2025-02-19 PROCEDURE — 97112 NEUROMUSCULAR REEDUCATION: CPT

## 2025-02-19 NOTE — PROGRESS NOTES
"Daily Note     Today's date: 2025  Patient name: Aaron Richards  : 1965  MRN: [id number]  Referring provider: Dionne Kuhn PA-C  Dx:   Encounter Diagnosis     ICD-10-CM    1. Status post revision of total knee replacement, right  Z96.651       2. Physical deconditioning  R53.81                      Subjective: Pt states he was sore after last visit.      Objective: See treatment diary below      Assessment: Tolerated treatment well. Progressed in repetitions with khang walking, to complete with mild increase in soreness as session termination.  Patient would benefit from continued PT      Plan: Continue per plan of care.      Precautions: R Transmetatarsal amputation ~5 years, s/p R TKA revision 24, DM type 2    POC expires Unit limit Auth  expiration date PT/OT + Visit Limit?   3/28/25 N/A  DOMN      Sec - 60 visits per auth           Visit/Unit Tracking  AUTH Status:  Date 1/8 1/10 1/15 1/17 1/22 1/24 1/31 2/7 2/12 2/14 2/19    N/A Used 13 14 15 16 17 18 19 20 21 22 23     Remaining                     Manuals 2/12 2/14 2/19 1/10 1/15 1/17 1/22 1/24 1/31 2/7   Reassessment         TS                 Orthotic fitting      TS                    Neuro Re-Ed             SLR 3 way             Prone hamstring curls             Bridges w/ hip ABD             Supine PPT                          LAQ w/ hold             Matrix flex/ext 3x10 30#  ext 40# flex 3x15 30# ext 40# flex 4x10  30# ext 40# flex 3x10 30# ext 40# flex 3x10 30# ext 40# flex 3x10 30#  ext 40# flex    3x10 30#  ext 40# flex   Standing 3 way hip    Decl. today     3x10      Seated hip ABD             Lateral walks             Seated marches             Seated webslide: M, L rows             Seated MB Diagonal chops     X20 ea BMB  X20 BMB X20 BMB     FA EO firm    3x30\" 3x30\"   3x30\"     FT EO firm    3x30\" 3x30\"        FT EC firm             Tandem walking             Sidestepping on firm 3 laps x2 w/ hurdles CGA 3 laps w/ " "hurdles 6 laps w/ hurdles CGA       2 laps 1 UE                 Renee walking  3 laps CGA 6 laps CGA          Sidestepping on foam 3 laps CGA            Rockerboard A/P 2/2' CGA  A/P x20 ea          Biodex: A/P WS          Unable                Tandem stance    3x20\"      3x20''   TG squats                          HAMILTON, TUG, 5xSTS         TS    Ther Ex             Pt education          SPC vs. RW AD usage   Nustep for cardio 10' L4 10' L6  10' L6 10' L2  10' L2  10' L2 10' L6  10' L1  10' L4   STS    3x10          Supine hip flexor stretch w/ strap             Supine hamstring stretch w/ strap             Seated hamstring stretch w/ strap                                       Ther Activity             Alt step taps             Lateral walks       3 laps x 2 RTB      Ambulation w/ SPC 2 laps length of gym CGA w/ gait belt 4 laps gym CGA gait belt   4 laps CGA length of gym  80ft x 4 SPC CGA 80ft x 4 SPC(L) CGA w/ Gait belt  2 laps length of gym CGA w/ gait belt   Gait Training                                       Modalities                                                  "

## 2025-02-21 ENCOUNTER — OFFICE VISIT (OUTPATIENT)
Dept: PHYSICAL THERAPY | Facility: CLINIC | Age: 60
End: 2025-02-21
Payer: MEDICARE

## 2025-02-21 DIAGNOSIS — Z96.651 STATUS POST REVISION OF TOTAL KNEE REPLACEMENT, RIGHT: Primary | ICD-10-CM

## 2025-02-21 DIAGNOSIS — R53.81 PHYSICAL DECONDITIONING: ICD-10-CM

## 2025-02-21 PROCEDURE — 97112 NEUROMUSCULAR REEDUCATION: CPT

## 2025-02-21 PROCEDURE — 97110 THERAPEUTIC EXERCISES: CPT

## 2025-02-21 NOTE — PROGRESS NOTES
Daily Note    Today's date: 25  Patient name: Aaron Richards  : 1965  MRN: [id number]  Referring provider: Dionne Kuhn PA-C  Dx:   Encounter Diagnosis     ICD-10-CM    1. Status post revision of total knee replacement, right  Z96.651       2. Physical deconditioning  R53.81           Start Time: 1345  Stop Time: 1430  Total time in clinic (min): 45 minutes      Subjective: Aaron reports slight soreness today. He notes feeling stronger and has more endurance and better balance as time goes on.    Objective: See treatment diary below.    Assessment: Aaron tolerated treatment well with consistent cuing throughout. TE's were performed with increased reps and increased resistance. No new TE's were performed today. Following treatment, the patient demonstrated fatigue and would benefit from continued physical therapy. All standing exercises were performed with gait belt and close contact guard throughout the session.      Plan: Continue per plan of care.  Progress treatment as tolerated.         Precautions: R Transmetatarsal amputation ~5 years, s/p R TKA revision 24, DM type 2    POC expires Unit limit Auth  expiration date PT/OT + Visit Limit?   3/28/25 N/A  DOMN      Sec - 60 visits per auth           Visit/Unit Tracking  AUTH Status:  Date 1/8 1/10 1/15 1/17 1/22 1/24 1/31 2/7 2/12 2/14 2/19 2/21   N/A Used 13 14 15 16 17 18 19 20 21 22 23 24    Remaining                     Manuals    Reassessment         TS                 Orthotic fitting      TS                    Neuro Re-Ed             SLR 3 way             Prone hamstring curls             Bridges w/ hip ABD             Supine PPT                          LAQ w/ hold             Matrix flex/ext 3x10 30#  ext 40# flex 3x15 30# ext 40# flex 4x10  30# ext 40# flex 3x10 40# ext 50# flex  3x10 30#  ext 40# flex    3x10 30#  ext 40# flex   Standing 3 way hip         3x10      Seated  "hip ABD             Lateral walks    5 laps w/ GB         Seated marches             Seated webslide: M, L rows             Seated MB Diagonal chops       X20 BMB X20 BMB     FA EO firm        3x30\"     FT EO firm             FT EC firm             Tandem walking             Sidestepping on firm 3 laps x2 w/ hurdles CGA 3 laps w/ hurdles 6 laps w/ hurdles CGA       2 laps 1 UE                 Renee walking  3 laps CGA 6 laps CGA 5 laps GB, 1 ft each         Sidestepping on foam 3 laps CGA            Rockerboard A/P 2/2' CGA  A/P x20 ea          Biodex: A/P WS          Unable                Tandem stance          3x20''   TG squats                          HAMILTON, TUG, 5xSTS         TS    Ther Ex             Pt education          SPC vs. RW AD usage   Nustep for cardio 10' L4 10' L6  10' L6 10' L6    10' L2 10' L6  10' L1  10' L4   STS             Supine hip flexor stretch w/ strap             Supine hamstring stretch w/ strap             Seated hamstring stretch w/ strap                                       Ther Activity             Alt step taps             Lateral walks       3 laps x 2 RTB      Ambulation w/ SPC 2 laps length of gym CGA w/ gait belt 4 laps gym CGA gait belt  4 laps gym CGA gait belt   80ft x 4 SPC CGA 80ft x 4 SPC(L) CGA w/ Gait belt  2 laps length of gym CGA w/ gait belt   Gait Training                                       Modalities                                                  Merrick Waldron, PT  2/21/2025,2:24 PM  "

## 2025-02-25 ENCOUNTER — TELEPHONE (OUTPATIENT)
Dept: FAMILY MEDICINE CLINIC | Facility: CLINIC | Age: 60
End: 2025-02-25

## 2025-02-25 NOTE — TELEPHONE ENCOUNTER
Fax from  [employer] requesting chart notes within the last 6 months - printed 10/14/24 & 11/12/24 and successfully faxed.

## 2025-02-26 ENCOUNTER — OFFICE VISIT (OUTPATIENT)
Dept: PHYSICAL THERAPY | Facility: CLINIC | Age: 60
End: 2025-02-26
Payer: MEDICARE

## 2025-02-26 DIAGNOSIS — R53.81 PHYSICAL DECONDITIONING: ICD-10-CM

## 2025-02-26 DIAGNOSIS — Z96.651 STATUS POST REVISION OF TOTAL KNEE REPLACEMENT, RIGHT: Primary | ICD-10-CM

## 2025-02-26 PROCEDURE — 97112 NEUROMUSCULAR REEDUCATION: CPT

## 2025-02-26 PROCEDURE — 97530 THERAPEUTIC ACTIVITIES: CPT

## 2025-02-26 PROCEDURE — 97110 THERAPEUTIC EXERCISES: CPT

## 2025-02-26 NOTE — PROGRESS NOTES
"Daily Note     Today's date: 2025  Patient name: Aaron Richards  : 1965  MRN: [id number]  Referring provider: Dionne Kuhn PA-C  Dx:   Encounter Diagnosis     ICD-10-CM    1. Status post revision of total knee replacement, right  Z96.651       2. Physical deconditioning  R53.81                      Subjective: Pt reports his right knee has been a bit more sore after his recent visits.      Objective: See treatment diary below      Assessment: Tolerated treatment well. Added 2.5# CW during ambulation w/ SPC, pt able to complete all TE's today with mild increase in fatigue upon session termination. Patient would benefit from continued PT      Plan: Continue per plan of care.      Precautions: R Transmetatarsal amputation ~5 years, s/p R TKA revision 24, DM type 2    POC expires Unit limit Auth  expiration date PT/OT + Visit Limit?   3/28/25 N/A  DOMN      Sec - 60 visits per auth           Visit/Unit Tracking  AUTH Status:  Date 2/26 1/10 1/15 1/17 1/22 1/24 1/31 2/7 2/12 2/14 2/19 2/21   N/A Used 25 14 15 16 17 18 19 20 21 22 23 24    Remaining                     Manuals    Reassessment         TS                 Orthotic fitting                          Neuro Re-Ed             SLR 3 way             Prone hamstring curls             Bridges w/ hip ABD             Supine PPT                          LAQ w/ hold             Matrix flex/ext 3x10 30#  ext 40# flex 3x15 30# ext 40# flex 4x10  30# ext 40# flex 3x10 40# ext 50# flex NV     3x10 30#  ext 40# flex   Standing 3 way hip         3x10      Seated hip ABD             Lateral walks    5 laps w/ GB 5 laps w/ GB        Seated marches             Seated webslide: M, L rows             Seated MB Diagonal chops       X20 BMB X20 BMB     FA EO firm        3x30\"     FT EO firm             FT EC firm             Tandem walking             Sidestepping on firm 3 laps x2 w/ hurdles CGA 3 laps w/ hurdles 6 " laps w/ hurdles CGA  5 laps w/ khang SBA 1 UE     2 laps 1 UE                 Khang walking  3 laps CGA 6 laps CGA 5 laps GB, 1 ft each 5 laps SBA        Sidestepping on foam 3 laps CGA            Rockerboard A/P 2/2' CGA  A/P x20 ea          Biodex: A/P WS          Unable                Tandem stance          3x20''   TG squats                          HAMILTON, TUG, 5xSTS         TS    Ther Ex             Pt education          SPC vs. RW AD usage   Nustep for cardio 10' L4 10' L6  10' L6 10' L6  10' L6  10' L2 10' L6  10' L1  10' L4   STS             Supine hip flexor stretch w/ strap             Supine hamstring stretch w/ strap             Seated hamstring stretch w/ strap                                       Ther Activity             Alt step taps             Lateral walks       3 laps x 2 RTB      Ambulation w/ SPC 2 laps length of gym CGA w/ gait belt 4 laps gym CGA gait belt  4 laps gym CGA gait belt 2 laps gym CGA w/ gait belt 2.5# AW  80ft x 4 SPC CGA 80ft x 4 SPC(L) CGA w/ Gait belt  2 laps length of gym CGA w/ gait belt   Gait Training                                       Modalities

## 2025-02-28 ENCOUNTER — OFFICE VISIT (OUTPATIENT)
Dept: PHYSICAL THERAPY | Facility: CLINIC | Age: 60
End: 2025-02-28
Payer: MEDICARE

## 2025-02-28 DIAGNOSIS — Z96.651 STATUS POST REVISION OF TOTAL KNEE REPLACEMENT, RIGHT: Primary | ICD-10-CM

## 2025-02-28 DIAGNOSIS — R53.81 PHYSICAL DECONDITIONING: ICD-10-CM

## 2025-02-28 PROCEDURE — 97110 THERAPEUTIC EXERCISES: CPT

## 2025-02-28 PROCEDURE — 97112 NEUROMUSCULAR REEDUCATION: CPT

## 2025-02-28 NOTE — PROGRESS NOTES
Daily Note    Today's date: 25  Patient name: Aaron Richards  : 1965  MRN: [id number]  Referring provider: Dionne Kuhn PA-C  Dx:   Encounter Diagnosis     ICD-10-CM    1. Status post revision of total knee replacement, right  Z96.651       2. Physical deconditioning  R53.81           Start Time: 1300  Stop Time: 1345  Total time in clinic (min): 45 minutes      Subjective: Aaron reports no changes today. He sees the doctor next week and wants to take a break from the weights today.    Objective: See treatment diary below.    Assessment: Aaron tolerated treatment well with consistent cuing throughout. TE's were performed with increased reps. No new TE's were performed today. Following treatment, the patient demonstrated fatigue and would benefit from continued physical therapy.    Plan: Continue per plan of care.  Progress treatment as tolerated.         Precautions: R Transmetatarsal amputation ~5 years, s/p R TKA revision 24, DM type 2    POC expires Unit limit Auth  expiration date PT/OT + Visit Limit?   3/28/25 N/A  DOMN      Sec - 60 visits per auth           Visit/Unit Tracking  AUTH Status:  Date    N/A Used 25 26  16 17 18 19 20 21 22 23 24    Remaining                     Manuals    Reassessment         TS                 Orthotic fitting                          Neuro Re-Ed             SLR 3 way             Prone hamstring curls             Bridges w/ hip ABD             Supine PPT                          LAQ w/ hold             Matrix flex/ext 3x10 30#  ext 40# flex 3x15 30# ext 40# flex 4x10  30# ext 40# flex 3x10 40# ext 50# flex NV Nv     3x10 30#  ext 40# flex   Standing 3 way hip         3x10      Seated hip ABD             Lateral walks    5 laps w/ GB 5 laps w/ GB        Seated marches             Seated webslide: M, L rows             Seated MB Diagonal chops     "    X20 BMB     FA EO firm        3x30\"     FT EO firm             FT EC firm             Tandem walking             Sidestepping on firm 3 laps x2 w/ hurdles CGA 3 laps w/ hurdles 6 laps w/ hurdles CGA  5 laps w/ khang SBA 1 UE 5 laps w/ khang    2 laps 1 UE                 Khang walking  3 laps CGA 6 laps CGA 5 laps GB, 1 ft each 5 laps SBA 5 laps       Sidestepping on foam 3 laps CGA            Rockerboard A/P 2/2' CGA  A/P x20 ea          Biodex: A/P WS          Unable                Tandem stance          3x20''   TG squats                          HAMILTON, TUG, 5xSTS         TS    Ther Ex             Pt education          SPC vs. RW AD usage   Nustep for cardio 10' L4 10' L6  10' L6 10' L6  10' L6 10' L6   10' L6  10' L1  10' L4   STS             Supine hip flexor stretch w/ strap             Supine hamstring stretch w/ strap             Seated hamstring stretch w/ strap                                       Ther Activity             Alt step taps             Lateral walks             Ambulation w/ SPC 2 laps length of gym CGA w/ gait belt 4 laps gym CGA gait belt  4 laps gym CGA gait belt 2 laps gym CGA w/ gait belt 2.5# AW 2 cons. Laps w/ gait belt x2 SPC, 1 lap no AD x 2  80ft x 4 SPC(L) CGA w/ Gait belt  2 laps length of gym CGA w/ gait belt   Gait Training                                       Modalities                                                    Merrick Waldron, PT  2/28/2025,1:36 PM  "

## 2025-03-04 ENCOUNTER — OFFICE VISIT (OUTPATIENT)
Age: 60
End: 2025-03-04
Payer: MEDICARE

## 2025-03-04 VITALS — BODY MASS INDEX: 27.08 KG/M2 | WEIGHT: 222.4 LBS | HEIGHT: 76 IN

## 2025-03-04 DIAGNOSIS — Z96.651 STATUS POST REVISION OF TOTAL KNEE REPLACEMENT, RIGHT: Primary | ICD-10-CM

## 2025-03-04 PROCEDURE — 99213 OFFICE O/P EST LOW 20 MIN: CPT | Performed by: STUDENT IN AN ORGANIZED HEALTH CARE EDUCATION/TRAINING PROGRAM

## 2025-03-05 ENCOUNTER — EVALUATION (OUTPATIENT)
Dept: PHYSICAL THERAPY | Facility: CLINIC | Age: 60
End: 2025-03-05
Payer: MEDICARE

## 2025-03-05 DIAGNOSIS — Z96.651 STATUS POST REVISION OF TOTAL KNEE REPLACEMENT, RIGHT: Primary | ICD-10-CM

## 2025-03-05 DIAGNOSIS — R53.81 PHYSICAL DECONDITIONING: ICD-10-CM

## 2025-03-05 PROCEDURE — 97110 THERAPEUTIC EXERCISES: CPT

## 2025-03-05 PROCEDURE — 97140 MANUAL THERAPY 1/> REGIONS: CPT

## 2025-03-05 NOTE — PROGRESS NOTES
PT Re-Evaluation     Today's date: 3/5/2025  Patient name: Aaron Richards  : 1965  MRN: [id number]  Referring provider: Dionne Kuhn PA-C  Dx:   Encounter Diagnosis     ICD-10-CM    1. Status post revision of total knee replacement, right  Z96.651       2. Physical deconditioning  R53.81           Start Time: 1400  Stop Time: 1442  Total time in clinic (min): 42 minutes    Assessment    Assessment details: Pt presents today for reassessment.  Functional tests and measures reveal notable improvements in 5xSTS from 13.17s to 8.82s, as well as TUG score from 17.31 to 15.03s w/ usage of SPC instead of RW.  Pt able to complete ROBLERO balance test today with considerable improvements noted as demonstrated by resulting score of 26/56.  Despite improvements, pt continues to remain limited in functional capacity per previously mentioned tests and measures as compared to their peers particularly in reference to his TUG and roblero balance test results.  As such the patient. would benefit from continued intervention in order to further improve functional strength and endurance while initiating interventions focused on restoring balance and proprioception in order to improve ease of daily activity, decrease risk for falls, and improve overall quality of life.   Understanding of Dx/Px/POC: good     Prognosis: good    Goals  STG to be achieved by 4 weeks  -Pt will be independent with basic HEP.  MET  -Pt will improve MMT scores by 1/2 grade in all deficient planes in order to facilitate ease of functional activity.  MET  -Pt will improve ROM by 25% in all deficient planes in order to improve functional mobility.  - GOAL MET   -Pt will improve 5xSTS score by 2 seconds in order to demonstrate increased functional strength.  MET  -Pt will improve TUG score by 2 seconds in order to demonstrate improved functional mobility and decreased risk of falls.  MET    LTG to be achieved by Discharge  -Pt will be independent with  comprehensive HEP.  GOAL MET  -Pt will improve MMT scores to WFL in all deficient planes in order to facilitate ease of functional activity.  - GOAL MET   -Pt will improve ROM to WFL in all deficient planes in order to improve functional mobility.  PROGRESS  -Pt will report 0/10 at worst in order to demonstrate return to PLOF.  PROGRESS  -Pt will report no pain with usual ADLs. MET  -Pt will improve 5xSTS score by 4 seconds in order to demonstrate increased functional strength.  MET  -Pt will improve TUG score by 4 seconds in order to demonstrate improved functional mobility and decreased risk of falls.  MET  -Pt will improve roblero balance score from 19/56 to 30/56 in order to demonstrate increased postural stability during functional activity. - PROGRESS         Plan  Patient would benefit from: skilled physical therapy  Referral necessary: No    Planned therapy interventions: flexibility, functional ROM exercises, joint mobilization, manual therapy, neuromuscular re-education, patient/caregiver education, sensory integrative techniques, strengthening, stretching, therapeutic activities, therapeutic exercise, balance, balance/weight bearing training and home exercise program    Frequency: 2x week  Duration in weeks: 12  Plan of Care beginning date: 3/5/2025  Plan of Care expiration date: 2025  Treatment plan discussed with: patient        Subjective Evaluation    Patient Goals  Patient goals for therapy: improved balance, decreased pain and increased strength    Pain  Current pain ratin  At best pain ratin  At worst pain ratin    Social Support  Steps to enter house: yes  4  Lives in: one-story house  Lives with: spouse    Employment status: not working    Diagnostic Tests  X-ray: normal  Treatments  Previous treatment: physical therapy        Objective     Active Range of Motion   Left Hip   Normal active range of motion    Right Hip   Normal active range of motion  Left Knee   Prone flexion: 150  degrees   Extension: -5 degrees     Right Knee   Prone flexion: 130 degrees   Extension: -2 degrees     Passive Range of Motion   Left Knee   Prone flexion: 135 degrees     Right Knee   Prone flexion: 120 degrees     Strength/Myotome Testing     Left Hip   Planes of Motion   Flexion: 4+  Abduction: 5  Adduction: 4+    Right Hip   Planes of Motion   Flexion: 4+  Abduction: 5  Adduction: 4+    Left Knee   Flexion: 5  Extension: 5    Right Knee   Flexion: 5  Extension: 5    Left Ankle/Foot   Dorsiflexion: 3-    Right Ankle/Foot   Dorsiflexion: 2+    Ambulation     Comments   Ambulating with a rolling walker.    Functional Assessment        Comments  Functional tests and measures 2024:  5xSTS: 13.17 w/ UE assist  TU.31 w/ RW  HAMILTON2025  5xSTS - 10.3s w/ UE assist  TUG - 17.5s w/ RW  HAMILTON -         [place]:  Cipriano, 2009  Older Adults MDC:  0-24: 4.6 points  25-34: 6.3 points  35-44: 4.9 points  45-56: 3.3 points     Larisa et al, 1992  < 45/56 increased falls risk 5xSTS: Amol et al 2010  MDC: 2.3 sec  Age Norms:  60-69: 11.1 sec  70-79: 12.6 sec  80-89: 14.8 sec   TUG  Benjamin et al., 2005  MDC: 2.9 sec     Cut off score:  >13.5 sec community dwelling adults  >32.2 frail elderly  <20 I for basic transfers  >30 dependent on transfers 10 Meter Walk Test:   Adilson et al., 2006  Small meaningful change: 0.06 m/s  Substantial meaningful change: 0.14 m/s  MCID: 0.16 m/s     < 0.4 m/s household ambulators  0.4 - 0.8 m/s limited community ambulators  > 0.8 m/s community ambulators   FGA: Elaine et al., 2010  MCID: 4.2 pts  Geriatrics/community < 22/30 fall risk  Geriatrics/community < 20/30 unexplained falls     DGI  MDC: vestibular - 4 pts  MDC: geriatric/community - 3 pts  Falls risk <19/24 6 Minute Walk Test  Adilson et al., 2006  MDC: 60.98 m (200.01 ft)     Kathleen Ibrahim, & [place], 2012  MCID: 34.4 m     Age Norms  60-69: M - 1876 ft   F - 1765 ft  70-79: M - 1729 ft   F - 1545 ft  80-89: M - 1368  ft   F - 1286 ft   Expanded Disability Status Scale (EDSS)  0.0: Normal neurological function  1.0: No disability with only minimal signs  2.0: Minimal disability  3.0: Moderate disability  4.0: Relatively severe disability  5.0: Disability affects full daily activities  6.0: Assistance required to walk and work  7.0: Essentially restricted to w/c  8.0: Restricted to bed or w/c  9.0: Bedridden or unable to effectively communicate or eat/swallow  10.0: Death     Norms:  0-4.5: Independent community ambulator  5.0-9.5: Ambulation impairments (AD required)  6.5: Non-ambulatory or w/c bound  7.0: W/C bound permanently Modified [place]  0: No increase in tone  1: Catch and release or min resistance at end range  1+: Catch f/b min resistance throughout remainder (< half ROM)  2: Easily moved, but more marked tone throughout most ROM  3: Significant tone, PROM difficult  4: Rigid   12 Item MS Walking Scale (MD Boyd et al, 2008)  Healthy Adults: 2.2  MS: 28.2 MS Fatigue Impact Scale  MDC: 19.3%  Norm: 33/84                Precautions: R Transmetatarsal amputation ~5 years, s/p R TKA revision 9/19/24, DM type 2    POC expires Unit limit Auth  expiration date PT/OT + Visit Limit?   3/28/25 N/A  DOMN      Sec - 60 visits per auth           Visit/Unit Tracking  AUTH Status:  Date 2/26 2/28 3/5  1/22 1/24 1/31 2/7 2/12 2/14 2/19 2/21   N/A Used 25 26 27  17 18 19 20 21 22 23 24    Remaining                     Manuals 2/12 2/14 2/19 2/21 2/26 2/28 3/5  1/31 2/7   Reassessment       JK  TS                 Orthotic fitting                          Neuro Re-Ed             SLR 3 way             Prone hamstring curls             Bridges w/ hip ABD             Supine PPT                          LAQ w/ hold             Matrix flex/ext 3x10 30#  ext 40# flex 3x15 30# ext 40# flex 4x10  30# ext 40# flex 3x10 40# ext 50# flex NV Nv     3x10 30#  ext 40# flex   Standing 3 way hip              Seated hip ABD             Lateral walks     5 laps w/ GB 5 laps w/ GB        Seated marches             Seated webslide: M, L rows             Seated MB Diagonal chops             FA EO firm             FT EO firm             FT EC firm             Tandem walking             Sidestepping on firm 3 laps x2 w/ hurdles CGA 3 laps w/ hurdles 6 laps w/ hurdles CGA  5 laps w/ khang SBA 1 UE 5 laps w/ khang NV   2 laps 1 UE                 Khang walking  3 laps CGA 6 laps CGA 5 laps GB, 1 ft each 5 laps SBA 5 laps NV      Sidestepping on foam 3 laps CGA            Rockerboard A/P 2/2' CGA  A/P x20 ea          Biodex: A/P WS          Unable                Tandem stance          3x20''   TG squats                          HAMILTON, TUG, 5xSTS         TS    Ther Ex             Pt education       SPC vs. RW AD usage   SPC vs. RW AD usage   Nustep for cardio 10' L4 10' L6  10' L6 10' L6  10' L6 10' L6  10' L6 10' L6  10' L1  10' L4   STS             Supine hip flexor stretch w/ strap             Supine hamstring stretch w/ strap             Seated hamstring stretch w/ strap                                       Ther Activity             Alt step taps             Lateral walks             Ambulation w/ SPC 2 laps length of gym CGA w/ gait belt 4 laps gym CGA gait belt  4 laps gym CGA gait belt 2 laps gym CGA w/ gait belt 2.5# AW 2 cons. Laps w/ gait belt x2 SPC, 1 lap no AD x 2 NV   2 laps length of gym CGA w/ gait belt   Gait Training                                       Modalities

## 2025-03-07 ENCOUNTER — OFFICE VISIT (OUTPATIENT)
Dept: PHYSICAL THERAPY | Facility: CLINIC | Age: 60
End: 2025-03-07
Payer: MEDICARE

## 2025-03-07 DIAGNOSIS — R53.81 PHYSICAL DECONDITIONING: ICD-10-CM

## 2025-03-07 DIAGNOSIS — Z96.651 STATUS POST REVISION OF TOTAL KNEE REPLACEMENT, RIGHT: Primary | ICD-10-CM

## 2025-03-07 PROCEDURE — 97110 THERAPEUTIC EXERCISES: CPT

## 2025-03-07 PROCEDURE — 97112 NEUROMUSCULAR REEDUCATION: CPT

## 2025-03-07 NOTE — PROGRESS NOTES
Daily Note     Today's date: 3/7/2025  Patient name: Aaron Richards  : 1965  MRN: [id number]  Referring provider: Dionne Kuhn PA-C  Dx:   Encounter Diagnosis     ICD-10-CM    1. Status post revision of total knee replacement, right  Z96.651       2. Physical deconditioning  R53.81                      Subjective: Patient reports that he isn't feeling well. He didn't want to cancel his session, however isn't sure what all he will be able to do today.       Objective: See treatment diary below      Assessment: Tolerated treatment well. Completed limited session today at patient's request secondary to him not feeling well. Patient demonstrated fatigue post treatment, exhibited good technique with therapeutic exercises, and would benefit from continued PT      Plan: Continue per plan of care.      Precautions: R Transmetatarsal amputation ~5 years, s/p R TKA revision 24, DM type 2    POC expires Unit limit Auth  expiration date PT/OT + Visit Limit?   3/28/25 N/A  DOMN      Sec - 60 visits per auth           Visit/Unit Tracking  AUTH Status:  Date 2/26 2/28 3/5 3/7  1/24 1/31 2/7 2/12 2/14 2/19 2/21   N/A Used 25 26 27 28  18 19 20 21 22 23 24    Remaining                     Manuals 2/12 2/14 2/19 2/21 2/26 2/28 3/5 3/7 1/31 2/7   Reassessment       JK  TS                 Orthotic fitting                          Neuro Re-Ed             SLR 3 way             Prone hamstring curls             Bridges w/ hip ABD             Supine PPT                          LAQ w/ hold             Matrix flex/ext 3x10 30#  ext 40# flex 3x15 30# ext 40# flex 4x10  30# ext 40# flex 3x10 40# ext 50# flex NV Nv   3x10    40# ext  50# flex  3x10 30#  ext 40# flex   Standing 3 way hip             Seated hip ABD             Lateral walks    5 laps w/ GB 5 laps w/ GB        Seated marches             Seated webslide: M, L rows             Seated MB Diagonal chops             FA EO firm             FT EO firm             FT  EC firm             Tandem walking             Sidestepping on firm 3 laps x2 w/ hurdles CGA 3 laps w/ hurdles 6 laps w/ hurdles CGA  5 laps w/ khang SBA 1 UE 5 laps w/ khang NV   2 laps 1 UE                 Khang walking  3 laps CGA 6 laps CGA 5 laps GB, 1 ft each 5 laps SBA 5 laps NV      Sidestepping on foam 3 laps CGA            Rockerboard A/P 2/2' CGA  A/P x20 ea          Biodex: A/P WS          Unable                Tandem stance          3x20''   TG squats                          HAMILTON, TUG, 5xSTS         TS    Ther Ex             Pt education       SPC vs. RW AD usage   SPC vs. RW AD usage   Nustep for cardio 10' L4 10' L6  10' L6 10' L6  10' L6 10' L6  10' L6 L6 10' 10' L1  10' L4   STS             Supine hip flexor stretch w/ strap             Supine hamstring stretch w/ strap             Seated hamstring stretch w/ strap                                       Ther Activity             Alt step taps             Lateral walks             Ambulation w/ SPC 2 laps length of gym CGA w/ gait belt 4 laps gym CGA gait belt  4 laps gym CGA gait belt 2 laps gym CGA w/ gait belt 2.5# AW 2 cons. Laps w/ gait belt x2 SPC, 1 lap no AD x 2 NV   2 laps length of gym CGA w/ gait belt   Gait Training                                       Modalities

## 2025-03-12 ENCOUNTER — OFFICE VISIT (OUTPATIENT)
Dept: PHYSICAL THERAPY | Facility: CLINIC | Age: 60
End: 2025-03-12
Payer: MEDICARE

## 2025-03-12 DIAGNOSIS — Z96.651 STATUS POST REVISION OF TOTAL KNEE REPLACEMENT, RIGHT: Primary | ICD-10-CM

## 2025-03-12 DIAGNOSIS — R53.81 PHYSICAL DECONDITIONING: ICD-10-CM

## 2025-03-12 PROCEDURE — 97110 THERAPEUTIC EXERCISES: CPT

## 2025-03-12 PROCEDURE — 97112 NEUROMUSCULAR REEDUCATION: CPT

## 2025-03-12 PROCEDURE — L3010 FOOT LONGITUDINAL ARCH SUPPO: HCPCS

## 2025-03-12 NOTE — PROGRESS NOTES
Custom orthotics fitted to patients shoe and dispensed. Patient educated on appropriate break in period. Patient will follow up if any future problems arise.     Merrick Waldron, PT  3/12/2025  12:18 PM

## 2025-03-12 NOTE — PROGRESS NOTES
Daily Note     Today's date: 3/12/2025  Patient name: Aaron Richards  : 1965  MRN: [id number]  Referring provider: Dionne Kuhn PA-C  Dx:   Encounter Diagnosis     ICD-10-CM    1. Status post revision of total knee replacement, right  Z96.651       2. Physical deconditioning  R53.81                      Subjective: Pt presents to visit today with custom orthotic donned for right foot, states that he was able to practice with it earlier and it feels good.      Objective: See treatment diary below      Assessment: Tolerated treatment well. Able to complete 2 consecutive laps around gym today with SPC and CGA.  Minor lateral LOB noted however pt able to correct with step strategy.  Able to increase repetitions with matrix flexion and extension today before onset of fatigue.  PT will continue to progress as tolerated.  Patient would benefit from continued PT      Plan: Continue per plan of care.      Precautions: R Transmetatarsal amputation ~5 years, s/p R TKA revision 24, DM type 2    POC expires Unit limit Auth  expiration date PT/OT + Visit Limit?   3/28/25 N/A  DOMN      Sec - 60 visits per auth           Visit/Unit Tracking  AUTH Status:  Date 2/26 2/28 3/5 3/7 3/12 1/24 1/31 2/7 2/12 2/14 2/19 2/21   N/A Used 25 26 27 28 29 18 19 20 21 22 23 24    Remaining                     Manuals 2/12 2/14 2/19 2/21 2/26 2/28 3/5 3/7 3/12 2/7   Reassessment       JK                   Orthotic fitting                          Neuro Re-Ed             SLR 3 way             Prone hamstring curls             Bridges w/ hip ABD             Supine PPT                          LAQ w/ hold             Matrix flex/ext 3x10 30#  ext 40# flex 3x15 30# ext 40# flex 4x10  30# ext 40# flex 3x10 40# ext 50# flex NV Nv   3x10    40# ext  50# flex 4x10    40# ext  50# flex 3x10 30#  ext 40# flex   Standing 3 way hip             Seated hip ABD             Lateral walks    5 laps w/ GB 5 laps w/ GB        Seated william              Seated webslide: M, L rows             Seated MB Diagonal chops             FA EO firm             FT EO firm             FT EC firm             Tandem walking             Sidestepping on firm 3 laps x2 w/ hurdles CGA 3 laps w/ hurdles 6 laps w/ hurdles CGA  5 laps w/ khang SBA 1 UE 5 laps w/ khang NV  5 laps w/ khang 2 laps 1 UE                 Khang walking  3 laps CGA 6 laps CGA 5 laps GB, 1 ft each 5 laps SBA 5 laps NV  5 laps    Sidestepping on foam 3 laps CGA            Rockerboard A/P 2/2' CGA  A/P x20 ea          Biodex: A/P WS          Unable                Tandem stance          3x20''   TG squats                          HAMILTON, TUG, 5xSTS             Ther Ex             Pt education       SPC vs. RW AD usage   SPC vs. RW AD usage   Nustep for cardio 10' L4 10' L6  10' L6 10' L6  10' L6 10' L6  10' L6 L6 10' L4 10' 10' L4   STS             Supine hip flexor stretch w/ strap             Supine hamstring stretch w/ strap             Seated hamstring stretch w/ strap                                       Ther Activity             Alt step taps             Lateral walks             Ambulation w/ SPC 2 laps length of gym CGA w/ gait belt 4 laps gym CGA gait belt  4 laps gym CGA gait belt 2 laps gym CGA w/ gait belt 2.5# AW 2 cons. Laps w/ gait belt x2 SPC, 1 lap no AD x 2 NV  2 laps cons. W/ gait belt and SPC, CGA 2 laps length of gym CGA w/ gait belt   Gait Training                                       Modalities

## 2025-03-14 ENCOUNTER — OFFICE VISIT (OUTPATIENT)
Dept: PHYSICAL THERAPY | Facility: CLINIC | Age: 60
End: 2025-03-14
Payer: MEDICARE

## 2025-03-14 DIAGNOSIS — R53.81 PHYSICAL DECONDITIONING: ICD-10-CM

## 2025-03-14 DIAGNOSIS — Z96.651 STATUS POST REVISION OF TOTAL KNEE REPLACEMENT, RIGHT: Primary | ICD-10-CM

## 2025-03-14 PROCEDURE — 97110 THERAPEUTIC EXERCISES: CPT

## 2025-03-14 PROCEDURE — 97112 NEUROMUSCULAR REEDUCATION: CPT

## 2025-03-14 PROCEDURE — 97530 THERAPEUTIC ACTIVITIES: CPT

## 2025-03-14 NOTE — PROGRESS NOTES
Daily Note    Today's date: 25  Patient name: Aaron Richards  : 1965  MRN: [id number]  Referring provider: Dionne Kuhn PA-C  Dx:   Encounter Diagnosis     ICD-10-CM    1. Status post revision of total knee replacement, right  Z96.651       2. Physical deconditioning  R53.81           Start Time: 1300  Stop Time: 1345  Total time in clinic (min): 45 minutes      Subjective: Aaron reports some recent increase in foot drop. He notes the orthotics have helped him with driving.    Objective: See treatment diary below.    Assessment: Aaron tolerated treatment well with consistent cuing throughout. TE's were performed with increased reps and increased resistance. No new TE's were performed today. Following treatment, the patient demonstrated fatigue and would benefit from continued physical therapy. During ambulation with SPC, patient had 3x stumbles due to foot drop, but was able to catch himself with therapist assist and continue walking.    Plan: Continue per plan of care.  Progress treatment as tolerated.         Precautions: R Transmetatarsal amputation ~5 years, s/p R TKA revision 24, DM type 2    POC expires Unit limit Auth  expiration date PT/OT + Visit Limit?   3/28/25 N/A  DOMN      Sec - 60 visits per auth           Visit/Unit Tracking  AUTH Status:  Date 2/26 2/28 3/5 3/7 3/12 3/14  2/7 2/12 2/14 2/19 2/21   N/A Used 25 26 27 28 29 30  20 21 22 23 24    Remaining                     Manuals 2/12 2/14 2/19 2/21 2/26 2/28 3/5 3/7 3/12 3/14   Reassessment       JK                   Orthotic fitting                          Neuro Re-Ed             SLR 3 way             Prone hamstring curls             Bridges w/ hip ABD             Supine PPT                          LAQ w/ hold             Matrix flex/ext 3x10 30#  ext 40# flex 3x15 30# ext 40# flex 4x10  30# ext 40# flex 3x10 40# ext 50# flex NV NV  3x10  40# ext  50# flex 4x10  40# ext  50# flex 4x10    40# ext 50# 4x10    Standing 3 way hip             Seated hip ABD             Lateral walks    5 laps w/ GB 5 laps w/ GB        Seated marches             Seated webslide: M, L rows             Seated MB Diagonal chops             FA EO firm             FT EO firm             FT EC firm             Tandem walking             Sidestepping on firm 3 laps x2 w/ hurdles CGA 3 laps w/ hurdles 6 laps w/ hurdles CGA  5 laps w/ khang SBA 1 UE 5 laps w/ khang NV  5 laps w/ khang                 Khang walking  3 laps CGA 6 laps CGA 5 laps GB, 1 ft each 5 laps SBA 5 laps NV  5 laps    Sidestepping on foam 3 laps CGA            Rockerboard A/P 2/2' CGA  A/P x20 ea          Biodex: A/P WS                          Tandem stance             TG squats                          HAMILTON, RONDA, 5xSTS             Ther Ex             Pt education       SPC vs. RW AD usage      Nustep for cardio 10' L4 10' L6  10' L6 10' L6  10' L6 10' L6  10' L6 L6 10' L4 10' 10' L4    STS             Supine hip flexor stretch w/ strap             Supine hamstring stretch w/ strap             Seated hamstring stretch w/ strap                                       Ther Activity             Total gym squats          L22 3x10    Alt step taps             Lateral walks             Ambulation w/ SPC 2 laps length of gym CGA w/ gait belt 4 laps gym CGA gait belt  4 laps gym CGA gait belt 2 laps gym CGA w/ gait belt 2.5# AW 2 cons. Laps w/ gait belt x2 SPC, 1 lap no AD x 2 NV  2 laps cons. W/ gait belt and SPC, CGA 3 laps, w/ GB, SPC, CGA   Gait Training                                       Modalities                                                  Merrick Waldron, PT  3/14/2025,1:51 PM

## 2025-03-15 ENCOUNTER — NURSE TRIAGE (OUTPATIENT)
Dept: OTHER | Facility: OTHER | Age: 60
End: 2025-03-15

## 2025-03-15 DIAGNOSIS — I10 HYPERTENSION, ESSENTIAL: Primary | ICD-10-CM

## 2025-03-15 RX ORDER — VERAPAMIL HYDROCHLORIDE 360 MG/1
360 CAPSULE, DELAYED RELEASE ORAL
Qty: 30 CAPSULE | Refills: 1 | Status: SHIPPED | OUTPATIENT
Start: 2025-03-15 | End: 2025-03-25 | Stop reason: SDUPTHER

## 2025-03-15 NOTE — TELEPHONE ENCOUNTER
"FOLLOW UP: NA    REASON FOR CONVERSATION: Medication Problem    SYMPTOMS: NA    OTHER: Patient stated needs refill for verapamil 360mg, he has been on it for years. Stated [facility] is not in [facility], upon checking did not see it. States gets this medication filled at mail pharmacy, but sometimes at [facility] as well. [facility] pharmacist stated was not filled in quite some time and that last time dosage was different.   DISPOSITION: Call PCP Now / Spoke with Zita Rose PA-C, she will call patient directly, as medication was discharged after knee surgery due to low blood pressure.    Reason for Disposition   [1] Prescription refill request for ESSENTIAL medicine (i.e., likelihood of harm to patient if not taken) AND [2] triager unable to refill per department policy    Answer Assessment - Initial Assessment Questions  1. DRUG NAME: \"What medicine do you need to have refilled?\"      Verapamil 360mg once daily  2. REFILLS REMAINING: \"How many refills are remaining?\" (Note: The label on the medicine or pill bottle will show how many refills are remaining. If there are no refills remaining, then a renewal may be needed.)      None  4. PRESCRIBING HCP: \"Who prescribed it?\" Reason: If prescribed by specialist, call should be referred to that group.      Patient stated Dr. Oswaldo Prather    Patient states he gets refill from mail pharmacy.    Patient stating he went into [facility] to request medication refills and does not see his blood pressure medication verapamil 360mg orally once daily. When I checked his chart I do not see it neither. He states he has been on it for years and needs a refill.    Spoke with Yehuda Agee at [facility], stated had not fill Verapamil 360mg since 2012, but did a refill back in 2020 for Verapamil 120mg at bedtime once daily.    Protocols used: Medication Refill and Renewal Call-Adult-    "

## 2025-03-17 ENCOUNTER — TELEPHONE (OUTPATIENT)
Age: 60
End: 2025-03-17

## 2025-03-17 DIAGNOSIS — I10 HYPERTENSION, ESSENTIAL: Primary | ICD-10-CM

## 2025-03-17 DIAGNOSIS — Z12.5 PROSTATE CANCER SCREENING: ICD-10-CM

## 2025-03-17 DIAGNOSIS — C73 THYROID CANCER (HCC): ICD-10-CM

## 2025-03-17 DIAGNOSIS — R74.8 ELEVATED LIVER ENZYMES: ICD-10-CM

## 2025-03-17 DIAGNOSIS — Z79.4 TYPE 2 DIABETES MELLITUS WITH DIABETIC PERIPHERAL ANGIOPATHY WITHOUT GANGRENE, WITH LONG-TERM CURRENT USE OF INSULIN (HCC): ICD-10-CM

## 2025-03-17 DIAGNOSIS — K21.9 GASTROESOPHAGEAL REFLUX DISEASE WITHOUT ESOPHAGITIS: ICD-10-CM

## 2025-03-17 DIAGNOSIS — E78.5 HYPERLIPIDEMIA, UNSPECIFIED HYPERLIPIDEMIA TYPE: ICD-10-CM

## 2025-03-17 DIAGNOSIS — E11.51 TYPE 2 DIABETES MELLITUS WITH DIABETIC PERIPHERAL ANGIOPATHY WITHOUT GANGRENE, WITH LONG-TERM CURRENT USE OF INSULIN (HCC): ICD-10-CM

## 2025-03-17 NOTE — TELEPHONE ENCOUNTER
Pt states he has an upcoming appt with provider and would like to know if PCP can order lab work before the appt. Pt would like to have the following lab work ordered; Thyroid function, PSA, Kidney function.   PCP please call pt once the labs are placed, thank you.

## 2025-03-17 NOTE — TELEPHONE ENCOUNTER
Labs ordered for completion.     DO [facility]  3/17/2025 1:48 PM       Medication:   Requested Prescriptions     Pending Prescriptions Disp Refills    losartan (COZAAR) 100 MG tablet [Pharmacy Med Name: LOSARTAN 100MG TABLETS] 90 tablet 1     Sig: TAKE 1 TABLET BY MOUTH DAILY        Last Filled:      Patient Phone Number: [phone] (home)     Last appt: 7/15/2024   Next appt: Visit date not found    Last OARRS:       9/8/2017     3:56 PM   RX Monitoring   Attestation The Prescription Monitoring Report for this patient was reviewed today.

## 2025-03-19 ENCOUNTER — APPOINTMENT (OUTPATIENT)
Dept: LAB | Facility: CLINIC | Age: 60
End: 2025-03-19
Payer: MEDICARE

## 2025-03-19 ENCOUNTER — OFFICE VISIT (OUTPATIENT)
Dept: PHYSICAL THERAPY | Facility: CLINIC | Age: 60
End: 2025-03-19
Payer: MEDICARE

## 2025-03-19 DIAGNOSIS — K21.9 GASTROESOPHAGEAL REFLUX DISEASE WITHOUT ESOPHAGITIS: ICD-10-CM

## 2025-03-19 DIAGNOSIS — Z12.5 PROSTATE CANCER SCREENING: ICD-10-CM

## 2025-03-19 DIAGNOSIS — R74.8 ELEVATED LIVER ENZYMES: ICD-10-CM

## 2025-03-19 DIAGNOSIS — E11.51 TYPE 2 DIABETES MELLITUS WITH DIABETIC PERIPHERAL ANGIOPATHY WITHOUT GANGRENE, WITH LONG-TERM CURRENT USE OF INSULIN (HCC): ICD-10-CM

## 2025-03-19 DIAGNOSIS — E78.5 HYPERLIPIDEMIA, UNSPECIFIED HYPERLIPIDEMIA TYPE: ICD-10-CM

## 2025-03-19 DIAGNOSIS — C73 THYROID CANCER (HCC): ICD-10-CM

## 2025-03-19 DIAGNOSIS — Z79.4 TYPE 2 DIABETES MELLITUS WITH DIABETIC PERIPHERAL ANGIOPATHY WITHOUT GANGRENE, WITH LONG-TERM CURRENT USE OF INSULIN (HCC): ICD-10-CM

## 2025-03-19 DIAGNOSIS — Z96.651 STATUS POST REVISION OF TOTAL KNEE REPLACEMENT, RIGHT: Primary | ICD-10-CM

## 2025-03-19 DIAGNOSIS — I10 HYPERTENSION, ESSENTIAL: ICD-10-CM

## 2025-03-19 DIAGNOSIS — R53.81 PHYSICAL DECONDITIONING: ICD-10-CM

## 2025-03-19 LAB
BASOPHILS # BLD AUTO: 0.05 THOUSANDS/ÂΜL (ref 0–0.1)
BASOPHILS NFR BLD AUTO: 1 % (ref 0–1)
EOSINOPHIL # BLD AUTO: 0.35 THOUSAND/ÂΜL (ref 0–0.61)
EOSINOPHIL NFR BLD AUTO: 4 % (ref 0–6)
ERYTHROCYTE [DISTWIDTH] IN BLOOD BY AUTOMATED COUNT: 13.3 % (ref 11.6–15.1)
EST. AVERAGE GLUCOSE BLD GHB EST-MCNC: 160 MG/DL
HBA1C MFR BLD: 7.2 %
HCT VFR BLD AUTO: 42.9 % (ref 36.5–49.3)
HGB BLD-MCNC: 14.3 G/DL (ref 12–17)
IMM GRANULOCYTES # BLD AUTO: 0.03 THOUSAND/UL (ref 0–0.2)
IMM GRANULOCYTES NFR BLD AUTO: 0 % (ref 0–2)
LYMPHOCYTES # BLD AUTO: 2.73 THOUSANDS/ÂΜL (ref 0.6–4.47)
LYMPHOCYTES NFR BLD AUTO: 33 % (ref 14–44)
MCH RBC QN AUTO: 30.9 PG (ref 26.8–34.3)
MCHC RBC AUTO-ENTMCNC: 33.3 G/DL (ref 31.4–37.4)
MCV RBC AUTO: 93 FL (ref 82–98)
MONOCYTES # BLD AUTO: 0.78 THOUSAND/ÂΜL (ref 0.17–1.22)
MONOCYTES NFR BLD AUTO: 9 % (ref 4–12)
NEUTROPHILS # BLD AUTO: 4.46 THOUSANDS/ÂΜL (ref 1.85–7.62)
NEUTS SEG NFR BLD AUTO: 53 % (ref 43–75)
NRBC BLD AUTO-RTO: 0 /100 WBCS
PLATELET # BLD AUTO: 255 THOUSANDS/UL (ref 149–390)
PMV BLD AUTO: 12.2 FL (ref 8.9–12.7)
PSA SERPL-MCNC: 0.44 NG/ML (ref 0–4)
RBC # BLD AUTO: 4.63 MILLION/UL (ref 3.88–5.62)
TSH SERPL DL<=0.05 MIU/L-ACNC: 2.43 UIU/ML (ref 0.45–4.5)
WBC # BLD AUTO: 8.4 THOUSAND/UL (ref 4.31–10.16)

## 2025-03-19 PROCEDURE — 97110 THERAPEUTIC EXERCISES: CPT

## 2025-03-19 PROCEDURE — 80053 COMPREHEN METABOLIC PANEL: CPT

## 2025-03-19 PROCEDURE — 97112 NEUROMUSCULAR REEDUCATION: CPT

## 2025-03-19 PROCEDURE — 83036 HEMOGLOBIN GLYCOSYLATED A1C: CPT

## 2025-03-19 PROCEDURE — G0103 PSA SCREENING: HCPCS

## 2025-03-19 PROCEDURE — 80061 LIPID PANEL: CPT

## 2025-03-19 PROCEDURE — 36415 COLL VENOUS BLD VENIPUNCTURE: CPT

## 2025-03-19 PROCEDURE — 84443 ASSAY THYROID STIM HORMONE: CPT

## 2025-03-19 PROCEDURE — 85025 COMPLETE CBC W/AUTO DIFF WBC: CPT

## 2025-03-19 NOTE — PROGRESS NOTES
Daily Note     Today's date: 3/19/2025  Patient name: Aaron Richards  : 1965  MRN: [id number]  Referring provider: Dionne Kuhn PA-C  Dx:   Encounter Diagnosis     ICD-10-CM    1. Status post revision of total knee replacement, right  Z96.651       2. Physical deconditioning  R53.81                      Subjective: Pt states orthotic has been fitting him well, states he ordered a DF      Objective: See treatment diary below      Assessment: Tolerated treatment well. Able to increase resistance with current TE's today, pt able to complete with moderate increase in fatigue upon session termination.  Patient would benefit from continued PT      Plan: Continue per plan of care.      Precautions: R Transmetatarsal amputation ~5 years, s/p R TKA revision 24, DM type 2    POC expires Unit limit Auth  expiration date PT/OT + Visit Limit?   3/28/25 N/A  DOMN      Sec - 60 visits per auth           Visit/Unit Tracking  AUTH Status:  Date 2/26 2/28 3/5 3/7 3/12 3/14 3/19 2/7 2/12 2/14 2/19 2/21   N/A Used 25 26 27 28 29 30 31 20 21 22 23 24    Remaining                     Manuals 3/19 2/14 2/19 2/21 2/26 2/28 3/5 3/7 3/12 3/14   Reassessment       JK                   Orthotic fitting                          Neuro Re-Ed             SLR 3 way             Prone hamstring curls             Bridges w/ hip ABD             Supine PPT                          LAQ w/ hold             Matrix flex/ext 4x10 45# ext, 55# flex  4x10  30# ext 40# flex 3x10 40# ext 50# flex NV NV  3x10  40# ext  50# flex 4x10  40# ext  50# flex 4x10    40# ext 50# 4x10   Standing 3 way hip             Seated hip ABD             Lateral walks    5 laps w/ GB 5 laps w/ GB        Seated marches             Seated webslide: M, L rows             Seated MB Diagonal chops             FA EO firm             FT EO firm             FT EC firm             Tandem walking On foam 4 laps            Sidestepping on firm 4 laps w/ khang 2.5# CW  6  laps w/ hurdles CGA  5 laps w/ khang SBA 1 UE 5 laps w/ khang NV  5 laps w/ khang                 Khang walking 4 laps 2.5# CW  6 laps CGA 5 laps GB, 1 ft each 5 laps SBA 5 laps NV  5 laps    Sidestepping on foam             Rockerboard   A/P x20 ea          Biodex: A/P WS                          Tandem stance             TG squats                          HAMILTON, TUG, 5xSTS             Ther Ex             Pt education       SPC vs. RW AD usage      Nustep for cardio 10' L4  10' L6 10' L6  10' L6 10' L6  10' L6 L6 10' L4 10' 10' L4    STS             Supine hip flexor stretch w/ strap             Supine hamstring stretch w/ strap             Seated hamstring stretch w/ strap                                       Ther Activity             Total gym squats          L22 3x10    Alt step taps             Lateral walks             Ambulation w/ SPC    4 laps gym CGA gait belt 2 laps gym CGA w/ gait belt 2.5# AW 2 cons. Laps w/ gait belt x2 SPC, 1 lap no AD x 2 NV  2 laps cons. W/ gait belt and SPC, CGA 3 laps, w/ GB, SPC, CGA   Gait Training                                       Modalities

## 2025-03-20 LAB
ALBUMIN SERPL BCG-MCNC: 4.1 G/DL (ref 3.5–5)
ALP SERPL-CCNC: 112 U/L (ref 34–104)
ALT SERPL W P-5'-P-CCNC: 42 U/L (ref 7–52)
ANION GAP SERPL CALCULATED.3IONS-SCNC: 9 MMOL/L (ref 4–13)
AST SERPL W P-5'-P-CCNC: 32 U/L (ref 13–39)
BILIRUB SERPL-MCNC: 0.58 MG/DL (ref 0.2–1)
BUN SERPL-MCNC: 17 MG/DL (ref 5–25)
CALCIUM SERPL-MCNC: 9.3 MG/DL (ref 8.4–10.2)
CHLORIDE SERPL-SCNC: 102 MMOL/L (ref 96–108)
CHOLEST SERPL-MCNC: 122 MG/DL (ref ?–200)
CO2 SERPL-SCNC: 27 MMOL/L (ref 21–32)
CREAT SERPL-MCNC: 0.88 MG/DL (ref 0.6–1.3)
GFR SERPL CREATININE-BSD FRML MDRD: 92 ML/MIN/1.73SQ M
GLUCOSE P FAST SERPL-MCNC: 136 MG/DL (ref 65–99)
HDLC SERPL-MCNC: 36 MG/DL
LDLC SERPL CALC-MCNC: 56 MG/DL (ref 0–100)
NONHDLC SERPL-MCNC: 86 MG/DL
POTASSIUM SERPL-SCNC: 4.9 MMOL/L (ref 3.5–5.3)
PROT SERPL-MCNC: 7 G/DL (ref 6.4–8.4)
SODIUM SERPL-SCNC: 138 MMOL/L (ref 135–147)
TRIGL SERPL-MCNC: 148 MG/DL (ref ?–150)

## 2025-03-21 ENCOUNTER — OFFICE VISIT (OUTPATIENT)
Dept: PHYSICAL THERAPY | Facility: CLINIC | Age: 60
End: 2025-03-21
Payer: MEDICARE

## 2025-03-21 DIAGNOSIS — R53.81 PHYSICAL DECONDITIONING: ICD-10-CM

## 2025-03-21 DIAGNOSIS — Z96.651 STATUS POST REVISION OF TOTAL KNEE REPLACEMENT, RIGHT: Primary | ICD-10-CM

## 2025-03-21 PROCEDURE — 97112 NEUROMUSCULAR REEDUCATION: CPT

## 2025-03-21 PROCEDURE — 97110 THERAPEUTIC EXERCISES: CPT

## 2025-03-21 PROCEDURE — 97530 THERAPEUTIC ACTIVITIES: CPT

## 2025-03-21 NOTE — PROGRESS NOTES
Daily Note     Today's date: 3/21/2025  Patient name: Aaron Richards  : 1965  MRN: [id number]  Referring provider: Dionne Kuhn PA-C  Dx:   Encounter Diagnosis     ICD-10-CM    1. Status post revision of total knee replacement, right  Z96.651       2. Physical deconditioning  R53.81                      Subjective: Patient reports that he brought in a foot drop strap which attaches to his shoe that he'd like to try walking with to see if this assists with his helps him clear his foot better. He reports that since he had his amputation on his right foot, he has a hard time not catching his shoe on the ground.      Objective: See treatment diary below      Assessment: Tolerated treatment well. Applied foot drop strap to right ankle to promote improved ankle dorsiflexion and foot clearance. Patient demonstrating good stability and gait mechanics with use of strap and RW/SPC. He was advised to continue to practice with strap with his RW to become more comfortable. Able to complete 4 laps of ambulation around the gym with breaks between 2 laps due to fatigue. Patient demonstrated fatigue post treatment, exhibited good technique with therapeutic exercises, and would benefit from continued PT      Plan: Continue per plan of care.      Precautions: R Transmetatarsal amputation ~5 years, s/p R TKA revision 24, DM type 2    POC expires Unit limit Auth  expiration date PT/OT + Visit Limit?   3/28/25 N/A  DOMN      Sec - 60 visits per auth           Visit/Unit Tracking  AUTH Status:  Date 2/26 2/28 3/5 3/7 3/12 3/14 3/19 3/21   2/19 2/21   N/A Used 25 26 27 28 29 30 31 32   23 24    Remaining                     Manuals 3/19 3/21 2/19 2/21 2/26 2/28 3/5 3/7 3/12 3/14   Reassessment       JK                   Orthotic fitting                          Neuro Re-Ed             SLR 3 way             Prone hamstring curls             Bridges w/ hip ABD             Supine PPT                          LAQ w/ hold              Matrix flex/ext 4x10 45# ext, 55# flex 3x10  45# ext,  55# flex 4x10  30# ext 40# flex 3x10 40# ext 50# flex NV NV  3x10  40# ext  50# flex 4x10  40# ext  50# flex 4x10    40# ext 50# 4x10   Standing 3 way hip             Seated hip ABD             Lateral walks    5 laps w/ GB 5 laps w/ GB        Seated marches             Seated webslide: M, L rows             Seated MB Diagonal chops             FA EO firm             FT EO firm             FT EC firm             Tandem walking On foam 4 laps            Sidestepping on firm 4 laps w/ khang 2.5# CW  6 laps w/ hurdles CGA  5 laps w/ khang SBA 1 UE 5 laps w/ khang NV  5 laps w/ khang                 Khang walking 4 laps 2.5# CW  6 laps CGA 5 laps GB, 1 ft each 5 laps SBA 5 laps NV  5 laps    Sidestepping on foam             Rockerboard   A/P x20 ea          Biodex: A/P WS                          Tandem stance             TG squats                          HAMILTON, TUG, 5xSTS             Ther Ex             Pt education       SPC vs. RW AD usage      Nustep for cardio 10' L4 L4 10' 10' L6 10' L6  10' L6 10' L6  10' L6 L6 10' L4 10' 10' L4    STS             Supine hip flexor stretch w/ strap             Supine hamstring stretch w/ strap             Seated hamstring stretch w/ strap                                       Ther Activity             Total gym squats  L22 2x15  1x10        L22 3x10    Alt step taps             Lateral walks             Ambulation w/ SPC  4 laps gym CGA gait belt  4 laps gym CGA gait belt 2 laps gym CGA w/ gait belt 2.5# AW 2 cons. Laps w/ gait belt x2 SPC, 1 lap no AD x 2 NV  2 laps cons. W/ gait belt and SPC, CGA 3 laps, w/ GB, SPC, CGA   Gait Training                                       Modalities

## 2025-03-25 ENCOUNTER — OFFICE VISIT (OUTPATIENT)
Dept: FAMILY MEDICINE CLINIC | Facility: CLINIC | Age: 60
End: 2025-03-25
Payer: MEDICARE

## 2025-03-25 VITALS
RESPIRATION RATE: 18 BRPM | HEIGHT: 76 IN | DIASTOLIC BLOOD PRESSURE: 70 MMHG | BODY MASS INDEX: 29.35 KG/M2 | SYSTOLIC BLOOD PRESSURE: 106 MMHG | HEART RATE: 74 BPM | WEIGHT: 241 LBS | OXYGEN SATURATION: 96 %

## 2025-03-25 DIAGNOSIS — E11.59 TYPE 2 DIABETES MELLITUS WITH OTHER CIRCULATORY COMPLICATION, WITH LONG-TERM CURRENT USE OF INSULIN (HCC): ICD-10-CM

## 2025-03-25 DIAGNOSIS — F10.11 HISTORY OF ALCOHOL ABUSE: ICD-10-CM

## 2025-03-25 DIAGNOSIS — E11.42 TYPE 2 DIABETES MELLITUS WITH DIABETIC POLYNEUROPATHY, WITH LONG-TERM CURRENT USE OF INSULIN (HCC): ICD-10-CM

## 2025-03-25 DIAGNOSIS — Z79.4 TYPE 2 DIABETES MELLITUS WITH OTHER CIRCULATORY COMPLICATION, WITH LONG-TERM CURRENT USE OF INSULIN (HCC): ICD-10-CM

## 2025-03-25 DIAGNOSIS — E78.5 HYPERLIPIDEMIA, UNSPECIFIED HYPERLIPIDEMIA TYPE: ICD-10-CM

## 2025-03-25 DIAGNOSIS — Z12.11 COLON CANCER SCREENING: Primary | ICD-10-CM

## 2025-03-25 DIAGNOSIS — E03.9 ACQUIRED HYPOTHYROIDISM: ICD-10-CM

## 2025-03-25 DIAGNOSIS — C73 MALIGNANT NEOPLASM OF THYROID GLAND (HCC): ICD-10-CM

## 2025-03-25 DIAGNOSIS — E11.51 TYPE 2 DIABETES MELLITUS WITH DIABETIC PERIPHERAL ANGIOPATHY WITHOUT GANGRENE, WITH LONG-TERM CURRENT USE OF INSULIN (HCC): ICD-10-CM

## 2025-03-25 DIAGNOSIS — F32.A DEPRESSION, UNSPECIFIED DEPRESSION TYPE: ICD-10-CM

## 2025-03-25 DIAGNOSIS — Z79.4 TYPE 2 DIABETES MELLITUS WITH DIABETIC PERIPHERAL ANGIOPATHY WITHOUT GANGRENE, WITH LONG-TERM CURRENT USE OF INSULIN (HCC): ICD-10-CM

## 2025-03-25 DIAGNOSIS — I10 HYPERTENSION, ESSENTIAL: ICD-10-CM

## 2025-03-25 DIAGNOSIS — Z89.431 ACQUIRED ABSENCE OF RIGHT FOOT (HCC): ICD-10-CM

## 2025-03-25 DIAGNOSIS — Z79.4 TYPE 2 DIABETES MELLITUS WITH DIABETIC POLYNEUROPATHY, WITH LONG-TERM CURRENT USE OF INSULIN (HCC): ICD-10-CM

## 2025-03-25 DIAGNOSIS — K21.9 GASTROESOPHAGEAL REFLUX DISEASE WITHOUT ESOPHAGITIS: ICD-10-CM

## 2025-03-25 DIAGNOSIS — N18.32 CHRONIC KIDNEY DISEASE, STAGE 3B (HCC): ICD-10-CM

## 2025-03-25 PROCEDURE — 99214 OFFICE O/P EST MOD 30 MIN: CPT | Performed by: FAMILY MEDICINE

## 2025-03-25 PROCEDURE — G2211 COMPLEX E/M VISIT ADD ON: HCPCS | Performed by: FAMILY MEDICINE

## 2025-03-25 RX ORDER — LEVOTHYROXINE SODIUM 175 UG/1
175 TABLET ORAL DAILY
Qty: 90 TABLET | Refills: 1 | Status: SHIPPED | OUTPATIENT
Start: 2025-03-25 | End: 2025-04-09 | Stop reason: SDUPTHER

## 2025-03-25 RX ORDER — INSULIN GLARGINE 100 [IU]/ML
35 INJECTION, SOLUTION SUBCUTANEOUS 2 TIMES DAILY
Qty: 90 ML | Refills: 1 | Status: SHIPPED | OUTPATIENT
Start: 2025-03-25

## 2025-03-25 RX ORDER — ATORVASTATIN CALCIUM 40 MG/1
40 TABLET, FILM COATED ORAL
Qty: 90 TABLET | Refills: 1 | Status: SHIPPED | OUTPATIENT
Start: 2025-03-25 | End: 2025-04-09 | Stop reason: SDUPTHER

## 2025-03-25 RX ORDER — VERAPAMIL HYDROCHLORIDE 360 MG/1
360 CAPSULE, DELAYED RELEASE ORAL
Qty: 90 CAPSULE | Refills: 1 | Status: SHIPPED | OUTPATIENT
Start: 2025-03-25 | End: 2025-04-09 | Stop reason: SDUPTHER

## 2025-03-25 RX ORDER — ESCITALOPRAM OXALATE 5 MG/1
5 TABLET ORAL DAILY
Qty: 90 TABLET | Refills: 1 | Status: SHIPPED | OUTPATIENT
Start: 2025-03-25 | End: 2025-04-09 | Stop reason: SDUPTHER

## 2025-03-25 NOTE — PROGRESS NOTES
Name: Aaron Richards      : 1965      MRN: [id number]  Encounter Provider: Sujata Garvey DO  Encounter Date: 3/25/2025   Encounter department: [facility] [address]  :  Assessment & Plan  Hyperlipidemia, unspecified hyperlipidemia type    Orders:  •  atorvastatin (LIPITOR) 40 mg tablet; Take 1 tablet (40 mg total) by mouth daily with dinner    Depression, unspecified depression type      Orders:  •  escitalopram (LEXAPRO) 5 mg tablet; Take 1 tablet (5 mg total) by mouth daily    Acquired hypothyroidism    Orders:  •  levothyroxine 175 mcg tablet; Take 1 tablet (175 mcg total) by mouth daily    Hypertension, essential    Orders:  •  verapamil (Verelan) 360 MG 24 hr capsule; Take 1 capsule (360 mg total) by mouth daily at bedtime    Colon cancer screening    Orders:  •  Ambulatory Referral to Gastroenterology; Future    Acquired absence of right foot (HCC)         History of alcohol abuse         Malignant neoplasm of thyroid gland (HCC)         Type 2 diabetes mellitus with other circulatory complication, with long-term current use of insulin ([facility])    Lab Results   Component Value Date    HGBA1C 7.2 (H) 2025            Chronic kidney disease, stage 3b (HCC)  Lab Results   Component Value Date    EGFR 92 2025    EGFR 94 10/05/2024    EGFR 97 10/04/2024    CREATININE 0.88 2025    CREATININE 0.85 10/05/2024    CREATININE 0.77 10/04/2024            Gastroesophageal reflux disease without esophagitis  Continue with PPI.        Type 2 diabetes mellitus with diabetic peripheral angiopathy without gangrene, with long-term current use of insulin ([facility])  Will increase to 35 units BID from 30 units of Lantus BID. Continue with sliding scale novolog.   Lab Results   Component Value Date    HGBA1C 7.2 (H) 2025          Type 2 diabetes mellitus with diabetic polyneuropathy, with long-term current use of insulin ([facility])    Lab Results   Component Value Date     "HGBA1C 7.2 (H) 03/19/2025     Orders:  •  Insulin Glargine Solostar (Lantus SoloStar) 100 UNIT/ML SOPN; Inject 0.35 mL (35 Units total) as directed 2 (two) times a day         History of Present Illness   HPI    Notes that he is doing well.     Planning for colonoscopy.    Has had neuropathy pain in his feet for a long time, feels like this is getting worse. States that this comes and goes, worse at night. States that this feels like a stabbing pain in his foot. Trying tylenol, celebrex, advil without improvement. Physical therapy is helping per patient. Using a walker at this time, he is working toward a cane. Following with ortho for the knee.     Has been trying to keep glucose controlled.       Review of Systems    Objective   /70 (BP Location: Left arm, Patient Position: Sitting, Cuff Size: Large)   Pulse 74   Resp 18   Ht 6' 4\" (1.93 m)   Wt 109 kg (241 lb)   SpO2 96%   BMI 29.34 kg/m²      Physical Exam  Vitals reviewed.   Constitutional:       General: He is not in acute distress.     Appearance: Normal appearance.   HENT:      Head: Normocephalic and atraumatic.      Right Ear: External ear normal.      Left Ear: External ear normal.      Nose: Nose normal.      Mouth/Throat:      Mouth: Mucous membranes are moist.   Eyes:      Extraocular Movements: Extraocular movements intact.      Conjunctiva/sclera: Conjunctivae normal.      Pupils: Pupils are equal, round, and reactive to light.   Cardiovascular:      Rate and Rhythm: Normal rate and regular rhythm.      Heart sounds: Normal heart sounds.   Pulmonary:      Effort: Pulmonary effort is normal.      Breath sounds: Normal breath sounds. No wheezing, rhonchi or rales.   Abdominal:      General: Bowel sounds are normal. There is no distension.      Palpations: Abdomen is soft.      Tenderness: There is no abdominal tenderness.   Musculoskeletal:      Cervical back: Neck supple.      Right lower leg: No edema.      Left lower leg: No edema. "   Lymphadenopathy:      Cervical: No cervical adenopathy.   Skin:     General: Skin is warm.      Capillary Refill: Capillary refill takes less than 2 seconds.      Findings: No rash.   Neurological:      Mental Status: He is alert. Mental status is at baseline.           DO Ej Ceballos [facility]  4/1/2025 3:53 PM

## 2025-03-25 NOTE — ASSESSMENT & PLAN NOTE
Orders:  •  verapamil (Verelan) 360 MG 24 hr capsule; Take 1 capsule (360 mg total) by mouth daily at bedtime

## 2025-03-25 NOTE — ASSESSMENT & PLAN NOTE
Will increase to 35 units BID from 30 units of Lantus BID. Continue with sliding scale novolog.   Lab Results   Component Value Date    HGBA1C 7.2 (H) 03/19/2025

## 2025-03-25 NOTE — ASSESSMENT & PLAN NOTE
Orders:  •  atorvastatin (LIPITOR) 40 mg tablet; Take 1 tablet (40 mg total) by mouth daily with dinner

## 2025-03-26 ENCOUNTER — OFFICE VISIT (OUTPATIENT)
Dept: PHYSICAL THERAPY | Facility: CLINIC | Age: 60
End: 2025-03-26
Payer: MEDICARE

## 2025-03-26 DIAGNOSIS — R53.81 PHYSICAL DECONDITIONING: ICD-10-CM

## 2025-03-26 DIAGNOSIS — Z96.651 STATUS POST REVISION OF TOTAL KNEE REPLACEMENT, RIGHT: Primary | ICD-10-CM

## 2025-03-26 PROCEDURE — 97110 THERAPEUTIC EXERCISES: CPT

## 2025-03-26 PROCEDURE — 97112 NEUROMUSCULAR REEDUCATION: CPT

## 2025-03-26 PROCEDURE — 97530 THERAPEUTIC ACTIVITIES: CPT

## 2025-03-26 NOTE — PROGRESS NOTES
Daily Note    Today's date: 25  Patient name: Aaron Richards  : 1965  MRN: [id number]  Referring provider: Dionne Kuhn PA-C  Dx:   Encounter Diagnosis     ICD-10-CM    1. Status post revision of total knee replacement, right  Z96.651       2. Physical deconditioning  R53.81           Start Time: 1330  Stop Time: 1415  Total time in clinic (min): 45 minutes      Subjective: Aaron reports soreness following previous session. He notes that he has not been wearing his dorsiflexion lace.    Objective: See treatment diary below.    Assessment: Aaron tolerated treatment well with consistent cuing throughout. TE's were performed with increased reps and increased resistance. No new TE's were performed today. Following treatment, the patient demonstrated fatigue and would benefit from continued physical therapy.    Plan: Continue per plan of care.  Progress treatment as tolerated.         Precautions: R Transmetatarsal amputation ~5 years, s/p R TKA revision 24, DM type 2    POC expires Unit limit Auth  expiration date PT/OT + Visit Limit?   3/28/25 N/A  DOMN      Sec - 60 visits per auth           Visit/Unit Tracking  AUTH Status:  Date 2/26 2/28 3/5 3/7 3/12 3/14 3/19 3/21 3/26  2/19 2/21   N/A Used 25 26 27 28 29 30 31 32 33  23 24    Remaining                     Manuals 3/19 3/21 3/26  2/26 2/28 3/5 3/7 3/12 3/14   Reassessment       JK                   Orthotic fitting                          Neuro Re-Ed             SLR 3 way             Prone hamstring curls             Bridges w/ hip ABD             Supine PPT                          LAQ w/ hold             Matrix flex/ext 4x10 45# ext, 55# flex 3x10  45# ext,  55# flex 3x15 30# 3x15 50#  NV NV  3x10  40# ext  50# flex 4x10  40# ext  50# flex 4x10    40# ext 50# 4x10   Standing 3 way hip             Seated hip ABD             Lateral walks     5 laps w/ GB        Seated marches             Seated webslide: M, L rows            "  Seated MB Diagonal chops             FA EO firm             FT EO firm             FT EC firm             Tandem walking On foam 4 laps            Sidestepping on firm 4 laps w/ khang 2.5# CW    5 laps w/ khang SBA 1 UE 5 laps w/ khang NV  5 laps w/ khang                 Khang walking 4 laps 2.5# CW    5 laps SBA 5 laps NV  5 laps    Sidestepping on foam             Rockerboard             Biodex: A/P WS                          Tandem stance             TG squats                          HAMILTON, RONDA, 5xSTS             Ther Ex             Pt education       SPC vs. RW AD usage      Nustep for cardio 10' L4 L4 10' 11' L4 1000 steps   10' L6 10' L6  10' L6 L6 10' L4 10' 10' L4    STS             Supine hip flexor stretch w/ strap             Supine hamstring stretch w/ strap             Seated hamstring stretch w/ strap                                       Ther Activity             Total gym squats  L22 2x15  1x10 L22 3x10        L22 3x10    Alt step taps             Lateral walks             Ambulation w/ SPC  4 laps gym CGA gait belt 5 laps CGA gait belt 90\"/ SPC  2 laps gym CGA w/ gait belt 2.5# AW 2 cons. Laps w/ gait belt x2 SPC, 1 lap no AD x 2 NV  2 laps cons. W/ gait belt and SPC, CGA 3 laps, w/ GB, SPC, CGA   Gait Training                                       Modalities                                                      Merrick Waldron, PT  3/26/2025,2:23 PM  "

## 2025-03-28 ENCOUNTER — OFFICE VISIT (OUTPATIENT)
Dept: PHYSICAL THERAPY | Facility: CLINIC | Age: 60
End: 2025-03-28
Payer: MEDICARE

## 2025-03-28 DIAGNOSIS — R53.81 PHYSICAL DECONDITIONING: ICD-10-CM

## 2025-03-28 DIAGNOSIS — Z96.651 STATUS POST REVISION OF TOTAL KNEE REPLACEMENT, RIGHT: Primary | ICD-10-CM

## 2025-03-28 PROCEDURE — 97110 THERAPEUTIC EXERCISES: CPT

## 2025-03-28 PROCEDURE — 97530 THERAPEUTIC ACTIVITIES: CPT

## 2025-03-28 NOTE — PROGRESS NOTES
Daily Note    Today's date: 25  Patient name: Aaron Richards  : 1965  MRN: [id number]  Referring provider: Dionne Kuhn PA-C  Dx:   Encounter Diagnosis     ICD-10-CM    1. Status post revision of total knee replacement, right  Z96.651       2. Physical deconditioning  R53.81           Start Time: 1300  Stop Time: 1345  Total time in clinic (min): 45 minutes      Subjective: Aaron reports no changes today.     Objective: See treatment diary below.    Assessment: Aaron tolerated treatment well with consistent cuing throughout. TE's were performed with the same resistance and reps. No new TE's were performed today. Following treatment, the patient demonstrated fatigue and would benefit from continued physical therapy.    Plan: Continue per plan of care.  Progress treatment as tolerated.         Precautions: R Transmetatarsal amputation ~5 years, s/p R TKA revision 24, DM type 2    POC expires Unit limit Auth  expiration date PT/OT + Visit Limit?   3/28/25 N/A  DOMN      Sec - 60 visits per auth           Visit/Unit Tracking  AUTH Status:  Date 2/26 2/28 3/5 3/7 3/12 3/14 3/19 3/21 3/26 3/28  2/21   N/A Used 25 26 27 28 29 30 31 32 33 34  24    Remaining                     Manuals 3/19 3/21 3/26 3/28  2/28 3/5 3/7 3/12 3/14   Reassessment       JK                   Orthotic fitting                          Neuro Re-Ed             SLR 3 way             Prone hamstring curls             Bridges w/ hip ABD             Supine PPT                          LAQ w/ hold             Matrix flex/ext 4x10 45# ext, 55# flex 3x10  45# ext,  55# flex 3x15 30# 3x15 50# 3x15 30# 3x15 50#  NV  3x10  40# ext  50# flex 4x10  40# ext  50# flex 4x10    40# ext 50# 4x10   Standing 3 way hip             Seated hip ABD             Lateral walks             Seated marches             Seated webslide: M, L rows             Seated MB Diagonal chops             FA EO firm             FT EO firm            "  FT EC firm             Tandem walking On foam 4 laps            Sidestepping on firm 4 laps w/ khang 2.5# CW     5 laps w/ khang NV  5 laps w/ khang                 Khang walking 4 laps 2.5# CW     5 laps NV  5 laps    Sidestepping on foam             Rockerboard             Biodex: A/P WS                          Tandem stance             TG squats                          HAMILTON, TUG, 5xSTS             Ther Ex             Pt education       SPC vs. RW AD usage      Nustep for cardio 10' L4 L4 10' 11' L4 1000 steps  12' L4 1100 steps  10' L6  10' L6 L6 10' L4 10' 10' L4    STS             Supine hip flexor stretch w/ strap             Supine hamstring stretch w/ strap             Seated hamstring stretch w/ strap                                       Ther Activity             Total gym squats  L22 2x15  1x10 L22 3x10  L22 3x10      L22 3x10    Alt step taps             Lateral walks             Ambulation w/ SPC  4 laps gym CGA gait belt 5 laps CGA gait belt 90\"/ SPC 2 laps x 4 CGA gait belt SPC R  2 cons. Laps w/ gait belt x2 SPC, 1 lap no AD x 2 NV  2 laps cons. W/ gait belt and SPC, CGA 3 laps, w/ GB, SPC, CGA   Gait Training                                       Modalities                                                      Merrick Waldron, PT  3/28/2025,1:18 PM  "

## 2025-04-01 NOTE — PCC PHYSICAL THERAPY
07/24/2024:  Patient recent admission to unit.    Patient participating in therapy and making positive gains.   Patient MOD I Bed mobility, CG STS with walker, CG/MIN A SPT with walker, MIN A ambulation up to 50' with walker and wheelchair follow, MOD I wheelchair mobility level and unlevel surfaces.  Ramp and steps not assessed.  Patient limited by decreased ROM/strength, decreased balance and safety, decreased endurance, pain, and orthostatic hypotension.  Patient would benefit from continued inpatient [facility] PT to increase function, safety, and independence in prep for safe d/c to home with family and continued PT services as needed.    07/30/2024:  Patient following [facility] PT, progressing well.  Limited by orthostatic BPs.    Patient participating in therapy and making positive gains.   Patient MOD I Bed mobility, MI STS with walker, MI SPT without walker, MIN A ambulation up to 130' with walker and wheelchair follow due to orthostatic BPs, MOD I wheelchair mobility level and unlevel surfaces.  Min A negotiation of 6 stairs with single HR.  Patient limited by decreased ROM/strength, decreased balance and safety, decreased endurance, pain, and orthostatic hypotension.  Patient would benefit from continued inpatient [facility] PT to increase function, safety, and independence in prep for safe d/c to home with family and continued PT services as needed.  
Detail Level: Zone

## 2025-04-02 ENCOUNTER — OFFICE VISIT (OUTPATIENT)
Dept: PHYSICAL THERAPY | Facility: CLINIC | Age: 60
End: 2025-04-02
Payer: MEDICARE

## 2025-04-02 DIAGNOSIS — R53.81 PHYSICAL DECONDITIONING: ICD-10-CM

## 2025-04-02 DIAGNOSIS — Z96.651 STATUS POST REVISION OF TOTAL KNEE REPLACEMENT, RIGHT: Primary | ICD-10-CM

## 2025-04-02 PROCEDURE — 97110 THERAPEUTIC EXERCISES: CPT

## 2025-04-02 PROCEDURE — 97530 THERAPEUTIC ACTIVITIES: CPT

## 2025-04-02 PROCEDURE — 97112 NEUROMUSCULAR REEDUCATION: CPT

## 2025-04-02 NOTE — PROGRESS NOTES
Daily Note     Today's date: 2025  Patient name: Aaron Richards  : 1965  MRN: [id number]  Referring provider: Dionne Kuhn PA-C  Dx:   Encounter Diagnosis     ICD-10-CM    1. Status post revision of total knee replacement, right  Z96.651       2. Physical deconditioning  R53.81                      Subjective: Pt reports no changes in status since LV.      Objective: See treatment diary below      Assessment: Tolerated treatment well. Implemented 2.5# CW to ambulation w/ SPC, pt did have 2x lateral LOB which required La from PT for correction.  PT will continue to progress as tolerated.   Patient would benefit from continued PT      Plan: Continue per plan of care.      Precautions: R Transmetatarsal amputation ~5 years, s/p R TKA revision 24, DM type 2    POC expires Unit limit Auth  expiration date PT/OT + Visit Limit?   3/28/25 N/A  DOMN      Sec - 60 visits per auth           Visit/Unit Tracking  AUTH Status:  Date 2/26 2/28 3/5 3/7 3/12 3/14 3/19 3/21 3/26 3/28 4/2 2/21   N/A Used 25 26 27 28 29 30 31 32 33 34  24    Remaining                     Manuals 3/19 3/21 3/26 3/28 4/2  3/5 3/7 3/12 3/14   Reassessment       JK                   Orthotic fitting                          Neuro Re-Ed             SLR 3 way             Prone hamstring curls             Bridges w/ hip ABD             Supine PPT             Ankle 4 way seated w/ TB     instructed        LAQ w/ hold             Matrix flex/ext 4x10 45# ext, 55# flex 3x10  45# ext,  55# flex 3x15 30# 3x15 50# 3x15 30# 3x15 50# 3x10 40# 3x10 50#   3x10  40# ext  50# flex 4x10  40# ext  50# flex 4x10    40# ext 50# 4x10   Standing 3 way hip             Seated hip ABD             Lateral walks             Seated marches             Seated webslide: M, L rows             Seated MB Diagonal chops             FA EO firm             FT EO firm             FT EC firm             Tandem walking On foam 4 laps            Sidestepping on firm 4  "laps w/ khang 2.5# CW      NV  5 laps w/ khang                 Khang walking 4 laps 2.5# CW      NV  5 laps    Sidestepping on foam             Rockerboard             Biodex: A/P WS                          Tandem stance             TG squats                          HAMILTON, RONDA, 5xSTS             Ther Ex             Pt education       SPC vs. RW AD usage      Nustep for cardio 10' L4 L4 10' 11' L4 1000 steps  12' L4 1100 steps 12' L4 1000 steps   10' L6 L6 10' L4 10' 10' L4    STS             Supine hip flexor stretch w/ strap             Supine hamstring stretch w/ strap             Seated hamstring stretch w/ strap                                       Ther Activity             Total gym squats  L22 2x15  1x10 L22 3x10  L22 3x10      L22 3x10    Alt step taps             Lateral walks             Ambulation w/ SPC  4 laps gym CGA gait belt 5 laps CGA gait belt 90\"/ SPC 2 laps x 4 CGA gait belt SPC R 2 laps x 4 CGA gait belt SPC R 2.5# CW    2 laps cons. W/ gait belt and SPC, CGA 3 laps, w/ GB, SPC, CGA   Gait Training                                       Modalities                                                        "

## 2025-04-04 ENCOUNTER — EVALUATION (OUTPATIENT)
Dept: PHYSICAL THERAPY | Facility: CLINIC | Age: 60
End: 2025-04-04
Payer: MEDICARE

## 2025-04-04 DIAGNOSIS — Z96.651 STATUS POST REVISION OF TOTAL KNEE REPLACEMENT, RIGHT: Primary | ICD-10-CM

## 2025-04-04 DIAGNOSIS — R53.81 PHYSICAL DECONDITIONING: ICD-10-CM

## 2025-04-04 PROCEDURE — 97140 MANUAL THERAPY 1/> REGIONS: CPT

## 2025-04-04 PROCEDURE — 97112 NEUROMUSCULAR REEDUCATION: CPT

## 2025-04-04 PROCEDURE — 97110 THERAPEUTIC EXERCISES: CPT

## 2025-04-04 NOTE — PROGRESS NOTES
PT Re-Evaluation     Today's date: 2025  Patient name: Aaron Richards  : 1965  MRN: [id number]  Referring provider: Dionne Kuhn PA-C  Dx:   Encounter Diagnosis     ICD-10-CM    1. Status post revision of total knee replacement, right  Z96.651       2. Physical deconditioning  R53.81           Start Time: 1345  Stop Time: 1430  Total time in clinic (min): 45 minutes    Assessment    Assessment details: Aaron Richards is a pleasant 60 y.o. male who presents today for a re-evaluation of his R knee and physical deconditioning    Aaron complains of no pain at this time.    The patient demonstrates within functional limits strength during resisted muscle testing, which has improved from initial evaluation. Pt ROM is minimally decreased with hard end feel. His balance has also improved since starting therapy, but has not improved since last month. His endurance continues to improve and will be the focus of the updated plan of care.    The patient has difficulty with ambulation, transfers, and leisure. Patient will benefit from continued skilled physical therapy, including therapeutic exercise, stretching, and balance training to improve their level of function, to increase overall quality of life, and to address his impairments.    Aaron has met most of his goals at this time. Pt was instructed on his updated plan of care and wishes to continue therapy.    Understanding of Dx/Px/POC: good     Prognosis: good    Goals  STG to be achieved by 4 weeks  -Pt will be independent with basic HEP.  MET  -Pt will improve MMT scores by 1/2 grade in all deficient planes in order to facilitate ease of functional activity.  MET  -Pt will improve ROM by 25% in all deficient planes in order to improve functional mobility.  - GOAL MET   -Pt will improve 5xSTS score by 2 seconds in order to demonstrate increased functional strength.  MET  -Pt will improve TUG score by 2 seconds in order to demonstrate  improved functional mobility and decreased risk of falls.  MET    LTG to be achieved by Discharge  -Pt will be independent with comprehensive HEP.  GOAL MET  -Pt will improve MMT scores to WFL in all deficient planes in order to facilitate ease of functional activity.  - GOAL MET   -Pt will improve ROM to WFL in all deficient planes in order to improve functional mobility. - GOAL MET   -Pt will report 0/10 at worst in order to demonstrate return to PLOF.  PROGRESS  -Pt will report no pain with usual ADLs. MET  -Pt will improve 5xSTS score by 4 seconds in order to demonstrate increased functional strength.  MET  -Pt will improve TUG score by 4 seconds in order to demonstrate improved functional mobility and decreased risk of falls.  MET  -Pt will improve roblero balance score from 19/56 to 30/56 in order to demonstrate increased postural stability during functional activity. - PROGRESS  -Pt will improve 2 minute walk test from 250 feet to 275 feet or greater.         Plan  Patient would benefit from: skilled physical therapy  Referral necessary: No    Planned therapy interventions: flexibility, functional ROM exercises, joint mobilization, manual therapy, neuromuscular re-education, patient/caregiver education, sensory integrative techniques, strengthening, stretching, therapeutic activities, therapeutic exercise, balance, balance/weight bearing training and home exercise program    Frequency: 2x week  Duration in weeks: 4  Plan of Care beginning date: 4/4/2025  Plan of Care expiration date: 5/2/2025  Treatment plan discussed with: patient        Subjective Evaluation    History of Present Illness  Mechanism of injury: Aaron reports that he is feeling better since starting therapy a few months ago.    he notes some difficulty with his usual tasks including prolonged walking and stairs.     he notes no pain at this time, but does have some aching occasionally.    The patient notes adherence to HEP and would like to  continue therapy for one more month, then begin with independent exercise in the fitness program.    Patient Goals  Patient goals for therapy: improved balance, decreased pain and increased strength    Pain  No pain reported    Social Support  Steps to enter house: yes  4  Lives in: one-story house  Lives with: spouse    Employment status: not working    Diagnostic Tests  X-ray: normal  Treatments  Previous treatment: physical therapy        Objective     Active Range of Motion   Left Hip   Normal active range of motion    Right Hip   Normal active range of motion  Left Knee   Prone flexion: 155 degrees   Extension: 0 degrees     Right Knee   Prone flexion: 135 degrees   Extension: 0 degrees     Passive Range of Motion   Left Knee   Prone flexion: 160 degrees     Right Knee   Prone flexion: 140 degrees     Strength/Myotome Testing     Left Hip   Planes of Motion   Flexion: 4+  Abduction: 5  Adduction: 4+    Right Hip   Planes of Motion   Flexion: 4+  Abduction: 5  Adduction: 4+    Left Knee   Flexion: 5  Extension: 5    Right Knee   Flexion: 5  Extension: 5    Ambulation     Comments   Ambulating with a rolling walker.    Functional Assessment        Comments  Functional tests and measures 2024:  5xSTS: 13.17 w/ UE assist  TU.31 w/ RW  HAMILTON2025  5xSTS - 10.3s w/ UE assist  TUG - 17.5s w/ RW  HAMILTON - 56    2025  5xSTS - 10s w/ UE assist  TUG - 18.2s w/ RW, 19s w/ SPC    2 minute walk test - 250 feet with RW  Maximum walking endurance with RW - 300 feet  Maximum walking endurance with SPC - 100 feet      [place]:  Cipriano, 2009  Older Adults MDC:  0-24: 4.6 points  25-34: 6.3 points  35-44: 4.9 points  45-56: 3.3 points     Larisa et al, 1992  < 45/56 increased falls risk 5xSTS: Amol et al 2010  MDC: 2.3 sec  Age Norms:  60-69: 11.1 sec  70-79: 12.6 sec  80-89: 14.8 sec   TUG  Benjamin mixon al., 2005  MDC: 2.9 sec     Cut off score:  >13.5 sec community dwelling adults  >32.2 frail  elderly  <20 I for basic transfers  >30 dependent on transfers 10 Meter Walk Test:   Adilson et al., 2006  Small meaningful change: 0.06 m/s  Substantial meaningful change: 0.14 m/s  MCID: 0.16 m/s     < 0.4 m/s household ambulators  0.4 - 0.8 m/s limited community ambulators  > 0.8 m/s community ambulators   FGA: Elaine et al., 2010  MCID: 4.2 pts  Geriatrics/community < 22/30 fall risk  Geriatrics/community < 20/30 unexplained falls     DGI  MDC: vestibular - 4 pts  MDC: geriatric/community - 3 pts  Falls risk <19/24 6 Minute Walk Test  Adilson et al., 2006  MDC: 60.98 m (200.01 ft)     Kathleen Ibrahim, & Brianna, 2012  MCID: 34.4 m     Age Norms  60-69: M - 1876 ft   F - 1765 ft  70-79: M - 1729 ft   F - 1545 ft  80-89: M - 1368 ft   F - 1286 ft   Expanded Disability Status Scale (EDSS)  0.0: Normal neurological function  1.0: No disability with only minimal signs  2.0: Minimal disability  3.0: Moderate disability  4.0: Relatively severe disability  5.0: Disability affects full daily activities  6.0: Assistance required to walk and work  7.0: Essentially restricted to w/c  8.0: Restricted to bed or w/c  9.0: Bedridden or unable to effectively communicate or eat/swallow  10.0: Death     [place]:  0-4.5: Independent community ambulator  5.0-9.5: Ambulation impairments (AD required)  6.5: Non-ambulatory or w/c bound  7.0: W/C bound permanently Modified [place]  0: No increase in tone  1: Catch and release or min resistance at end range  1+: Catch f/b min resistance throughout remainder (< half ROM)  2: Easily moved, but more marked tone throughout most ROM  3: Significant tone, PROM difficult  4: Rigid   12 Item MS Walking Scale (MD Boyd et al, 2008)  Healthy Adults: 2.2  MS: 28.2 MS Fatigue Impact Scale  MDC: 19.3%  Norm: 33/84                Precautions: R Transmetatarsal amputation ~5 years, s/p R TKA revision 9/19/24, DM type 2    POC expires Unit limit Auth  expiration date PT/OT + Visit Limit?   3/28/25 N/A  DOMN       "Sec - 60 visits per auth           Visit/Unit Tracking  AUTH Status:  Date 2/26 2/28 3/5 3/7 3/12 3/14 3/19 3/21 3/26 3/28 4/2 2/21   N/A Used 25 26 27 28 29 30 31 32 33 34  24    Remaining                     Manuals 3/19 3/21 3/26 3/28 4/2 4/4  3/7 3/12 3/14   Reassessment      TS                    Orthotic fitting                          Neuro Re-Ed             SLR 3 way             Prone hamstring curls             Bridges w/ hip ABD             Supine PPT             Ankle 4 way seated w/ TB     instructed        LAQ w/ hold             Matrix flex/ext 4x10 45# ext, 55# flex 3x10  45# ext,  55# flex 3x15 30# 3x15 50# 3x15 30# 3x15 50# 3x10 40# 3x10 50#   3x10  40# ext  50# flex 4x10  40# ext  50# flex 4x10    40# ext 50# 4x10   Standing 3 way hip             Seated hip ABD             Lateral walks             Seated marches             Seated webslide: M, L rows             Seated MB Diagonal chops             FA EO firm             FT EO firm             FT EC firm             Tandem walking On foam 4 laps            Sidestepping on firm 4 laps w/ khang 2.5# CW        5 laps w/ khang                 Khang walking 4 laps 2.5# CW        5 laps    Sidestepping on foam             Rockerboard             Biodex: A/P WS                          Tandem stance             TG squats                          HAMILTON, TUG, 5xSTS, 2MWT      TS        Ther Ex             Pt education             Nustep for cardio 10' L4 L4 10' 11' L4 1000 steps  12' L4 1100 steps 12' L4 1000 steps  11' L4 900 steps  L6 10' L4 10' 10' L4    STS             Supine hip flexor stretch w/ strap             Supine hamstring stretch w/ strap             Seated hamstring stretch w/ strap                                       Ther Activity             Total gym squats  L22 2x15  1x10 L22 3x10  L22 3x10      L22 3x10    Alt step taps             Lateral walks             Ambulation w/ SPC  4 laps gym CGA gait belt 5 laps CGA gait belt 90\"/ SPC 2 " laps x 4 CGA gait belt SPC R 2 laps x 4 CGA gait belt SPC R 2.5# CW    2 laps cons. W/ gait belt and SPC, CGA 3 laps, w/ GB, SPC, CGA   Gait Training                                       Modalities

## 2025-04-09 ENCOUNTER — OFFICE VISIT (OUTPATIENT)
Dept: PHYSICAL THERAPY | Facility: CLINIC | Age: 60
End: 2025-04-09
Payer: MEDICARE

## 2025-04-09 DIAGNOSIS — F32.A DEPRESSION, UNSPECIFIED DEPRESSION TYPE: ICD-10-CM

## 2025-04-09 DIAGNOSIS — Z96.651 STATUS POST REVISION OF TOTAL KNEE REPLACEMENT, RIGHT: Primary | ICD-10-CM

## 2025-04-09 DIAGNOSIS — R53.81 PHYSICAL DECONDITIONING: ICD-10-CM

## 2025-04-09 DIAGNOSIS — E03.9 ACQUIRED HYPOTHYROIDISM: ICD-10-CM

## 2025-04-09 DIAGNOSIS — E78.5 HYPERLIPIDEMIA, UNSPECIFIED HYPERLIPIDEMIA TYPE: ICD-10-CM

## 2025-04-09 DIAGNOSIS — I10 HYPERTENSION, ESSENTIAL: ICD-10-CM

## 2025-04-09 PROCEDURE — 97530 THERAPEUTIC ACTIVITIES: CPT

## 2025-04-09 PROCEDURE — 97110 THERAPEUTIC EXERCISES: CPT

## 2025-04-09 NOTE — PROGRESS NOTES
Daily Note    Today's date: 25  Patient name: Aaron Richards  : 1965  MRN: [id number]  Referring provider: Dionne Kuhn PA-C  Dx:   Encounter Diagnosis     ICD-10-CM    1. Status post revision of total knee replacement, right  Z96.651       2. Physical deconditioning  R53.81           Start Time: 1330  Stop Time: 1415  Total time in clinic (min): 45 minutes      Subjective: Aaron reports he was up and walking in the store this weekend two times. He is a little sore and tired from it, but was able to do more than he could before.    Objective: See treatment diary below.    Assessment: Aaron tolerated treatment well with consistent cuing throughout. TE's were performed with increased reps and increased resistance. New TE's were demonstrated with proper technique, and tolerated well. Following treatment, the patient demonstrated fatigue and would benefit from continued physical therapy. All standing exercises were performed with gait belt and close contact guard throughout the session.    Plan: Continue per plan of care.  Progress treatment as tolerated.         Precautions: R Transmetatarsal amputation ~5 years, s/p R TKA revision 24, DM type 2    POC expires Unit limit Auth  expiration date PT/OT + Visit Limit?   3/28/25 N/A  DOMN      Sec - 60 visits per auth           Visit/Unit Tracking  AUTH Status:  Date 4/9  3/5 3/7 3/12 3/14 3/19 3/21 3/26 3/28 4/2 4/4   N/A Used 37  27 28 29 30 31 32 33 34 35 36    Remaining                     Manuals 3/19 3/21 3/26 3/28 4/2 4/4 4/9  3/12 3/14   Reassessment      TS                    Orthotic fitting                          Neuro Re-Ed             SLR 3 way             Prone hamstring curls             Bridges w/ hip ABD             Supine PPT             Ankle 4 way seated w/ TB     instructed        LAQ w/ hold             Matrix flex/ext 4x10 45# ext, 55# flex 3x10  45# ext,  55# flex 3x15 30# 3x15 50# 3x15 30# 3x15 50# 3x10 40#  "3x10 50#  3x12 40# 3x12 50#  4x10  40# ext  50# flex 4x10    40# ext 50# 4x10   Standing 3 way hip             Seated hip ABD             Lateral walks             Seated marches             Seated webslide: M, L rows             Seated MB Diagonal chops             FA EO firm             FT EO firm             FT EC firm             Tandem walking On foam 4 laps            Sidestepping on firm 4 laps w/ khang 2.5# CW        5 laps w/ khang                 Khang walking 4 laps 2.5# CW        5 laps    Sidestepping on foam             Rockerboard             Biodex: A/P WS                          Tandem stance             TG squats                          HAMILTON, TUG, 5xSTS, 2MWT      TS        Ther Ex             Pt education             Nustep for cardio 10' L4 L4 10' 11' L4 1000 steps  12' L4 1100 steps 12' L4 1000 steps  11' L4 900 steps 12' L4  L4 10' 10' L4    STS             Supine hip flexor stretch w/ strap             Supine hamstring stretch w/ strap             Seated hamstring stretch w/ strap                                       Ther Activity             Total gym squats  L22 2x15  1x10 L22 3x10  L22 3x10      L22 3x10    Alt step taps             Lateral walks             Ambulation w/ SPC  4 laps gym CGA gait belt 5 laps CGA gait belt 90\"/ SPC 2 laps x 4 CGA gait belt SPC R 2 laps x 4 CGA gait belt SPC R 2.5# CW    2 laps cons. W/ gait belt and SPC, CGA 3 laps, w/ GB, SPC, CGA   Ambulation with RW       1', 1' rest 5x      Gait Training                                       Modalities                                                            Merrick Waldron, PT  4/9/2025,2:13 PM  "

## 2025-04-10 RX ORDER — VERAPAMIL HYDROCHLORIDE 360 MG/1
360 CAPSULE, DELAYED RELEASE ORAL
Qty: 90 CAPSULE | Refills: 1 | Status: SHIPPED | OUTPATIENT
Start: 2025-04-10

## 2025-04-10 RX ORDER — ESCITALOPRAM OXALATE 5 MG/1
5 TABLET ORAL DAILY
Qty: 90 TABLET | Refills: 1 | Status: SHIPPED | OUTPATIENT
Start: 2025-04-10

## 2025-04-10 RX ORDER — ATORVASTATIN CALCIUM 40 MG/1
40 TABLET, FILM COATED ORAL
Qty: 90 TABLET | Refills: 1 | Status: SHIPPED | OUTPATIENT
Start: 2025-04-10

## 2025-04-10 RX ORDER — LEVOTHYROXINE SODIUM 175 UG/1
175 TABLET ORAL DAILY
Qty: 90 TABLET | Refills: 1 | Status: SHIPPED | OUTPATIENT
Start: 2025-04-10

## 2025-04-11 ENCOUNTER — OFFICE VISIT (OUTPATIENT)
Dept: PHYSICAL THERAPY | Facility: CLINIC | Age: 60
End: 2025-04-11
Payer: MEDICARE

## 2025-04-11 DIAGNOSIS — R53.81 PHYSICAL DECONDITIONING: ICD-10-CM

## 2025-04-11 DIAGNOSIS — Z96.651 STATUS POST REVISION OF TOTAL KNEE REPLACEMENT, RIGHT: Primary | ICD-10-CM

## 2025-04-11 PROCEDURE — 97530 THERAPEUTIC ACTIVITIES: CPT

## 2025-04-11 PROCEDURE — 97110 THERAPEUTIC EXERCISES: CPT

## 2025-04-11 NOTE — PROGRESS NOTES
Daily Note    Today's date: 25  Patient name: Aaron Richards  : 1965  MRN: [id number]  Referring provider: Dionne Kuhn PA-C  Dx:   Encounter Diagnosis     ICD-10-CM    1. Status post revision of total knee replacement, right  Z96.651       2. Physical deconditioning  R53.81           Start Time: 1145  Stop Time: 1230  Total time in clinic (min): 45 minutes      Subjective: Aaron reports some soreness and fatigue following previous session. He feels good and feels he is making progress.    Objective: See treatment diary below.    Assessment: Aaron tolerated treatment well with consistent cuing throughout. TE's were performed with increased reps and increased resistance. No new TE's were performed today. Following treatment, the patient demonstrated fatigue and would benefit from continued physical therapy. All standing exercises were performed with gait belt and close contact guard throughout the session.      Plan: Continue per plan of care.        Precautions: R Transmetatarsal amputation ~5 years, s/p R TKA revision 24, DM type 2    POC expires Unit limit Auth  expiration date PT/OT + Visit Limit?   3/28/25 N/A  DOMN      Sec - 60 visits per auth           Visit/Unit Tracking  AUTH Status:  Date 4/9 4/11  3/7 3/12 3/14 3/19 3/21 3/26 3/28 4/2 4/4   N/A Used 37 38  28 29 30 31 32 33 34 35 36    Remaining                     Manuals 3/19 3/21 3/26 3/28 4/2 4/4 4/9 4/11  3/14   Reassessment      TS                    Orthotic fitting                          Neuro Re-Ed             SLR 3 way             Prone hamstring curls             Bridges w/ hip ABD             Supine PPT             Ankle 4 way seated w/ TB     instructed        LAQ w/ hold             Matrix flex/ext 4x10 45# ext, 55# flex 3x10  45# ext,  55# flex 3x15 30# 3x15 50# 3x15 30# 3x15 50# 3x10 40# 3x10 50#  3x12 40# 3x12 50# 3x10 40# 3x10 50#  4x10    40# ext 50# 4x10   Standing 3 way hip             Seated  "hip ABD             Lateral walks             Seated marches             Seated webslide: M, L rows             Seated MB Diagonal chops             FA EO firm             FT EO firm             FT EC firm             Tandem walking On foam 4 laps            Sidestepping on firm 4 laps w/ khang 2.5# CW                         Khang walking 4 laps 2.5# CW            Sidestepping on foam             Rockerboard             Biodex: A/P WS                          Tandem stance             TG squats                          HAMILTON, TUG, 5xSTS, 2MWT      TS        Ther Ex             Pt education             Nustep for cardio 10' L4 L4 10' 11' L4 1000 steps  12' L4 1100 steps 12' L4 1000 steps  11' L4 900 steps 12' L4 12' L4 1000 steps  10' L4    STS             Supine hip flexor stretch w/ strap             Supine hamstring stretch w/ strap             Seated hamstring stretch w/ strap                                       Ther Activity             Total gym squats  L22 2x15  1x10 L22 3x10  L22 3x10    L22 4x12  L22 3x10    Alt step taps             Lateral walks             Ambulation w/ SPC  4 laps gym CGA gait belt 5 laps CGA gait belt 90\"/ SPC 2 laps x 4 CGA gait belt SPC R 2 laps x 4 CGA gait belt SPC R 2.5# CW     3 laps, w/ GB, SPC, CGA   Ambulation with RW       1', 1' rest 5x 70\", 50\" rest 5x     Gait Training                                       Modalities                                                            Merrick Waldron, PT  4/11/2025,12:24 PM  "

## 2025-04-16 ENCOUNTER — OFFICE VISIT (OUTPATIENT)
Dept: PHYSICAL THERAPY | Facility: CLINIC | Age: 60
End: 2025-04-16
Payer: MEDICARE

## 2025-04-16 DIAGNOSIS — R53.81 PHYSICAL DECONDITIONING: ICD-10-CM

## 2025-04-16 DIAGNOSIS — Z96.651 STATUS POST REVISION OF TOTAL KNEE REPLACEMENT, RIGHT: Primary | ICD-10-CM

## 2025-04-16 PROCEDURE — 97110 THERAPEUTIC EXERCISES: CPT

## 2025-04-16 PROCEDURE — 97530 THERAPEUTIC ACTIVITIES: CPT

## 2025-04-16 NOTE — PROGRESS NOTES
Daily Note    Today's date: 25  Patient name: Aaron Richards  : 1965  MRN: [id number]  Referring provider: Dionne Kuhn PA-C  Dx:   Encounter Diagnosis     ICD-10-CM    1. Status post revision of total knee replacement, right  Z96.651       2. Physical deconditioning  R53.81           Start Time: 1330  Stop Time: 1415  Total time in clinic (min): 45 minutes      Subjective: Aaron reports some fatigue today and has some    Objective: See treatment diary below.    Assessment: Aaron tolerated treatment well with consistent cuing throughout. TE's were performed with increased reps. No new TE's were performed today. Following treatment, the patient demonstrated fatigue and would benefit from continued physical therapy.    Plan: Continue per plan of care.        Precautions: R Transmetatarsal amputation ~5 years, s/p R TKA revision 24, DM type 2    POC expires Unit limit Auth  expiration date PT/OT + Visit Limit?   3/28/25 N/A  DOMN      Sec - 60 visits per auth           Visit/Unit Tracking  AUTH Status:  Date 4/9 4/11 4/16  3/12 3/14 3/19 3/21 3/26 3/28 4/2 4/4   N/A Used 37 38 39  29 30 31 32 33 34 35 36    Remaining                     Manuals 3/19 3/21 3/26 3/28 4/2 4/4 4/9 4/11 4/16    Reassessment      TS                    Orthotic fitting                          Neuro Re-Ed             SLR 3 way             Prone hamstring curls             Bridges w/ hip ABD             Supine PPT             Ankle 4 way seated w/ TB     instructed        LAQ w/ hold             Matrix flex/ext 4x10 45# ext, 55# flex 3x10  45# ext,  55# flex 3x15 30# 3x15 50# 3x15 30# 3x15 50# 3x10 40# 3x10 50#  3x12 40# 3x12 50# 3x10 40# 3x10 50# 3x20 30# 3x20 40#    Standing 3 way hip             Seated hip ABD             Lateral walks             Seated marches             Seated webslide: M, L rows             Seated MB Diagonal chops             FA EO firm             FT EO firm             FT EC  "firm             Tandem walking On foam 4 laps            Sidestepping on firm 4 laps w/ khang 2.5# CW                         Khang walking 4 laps 2.5# CW            Sidestepping on foam             Rockerboard             Biodex: A/P WS                          Tandem stance             TG squats                          HAMILTON, TUG, 5xSTS, 2MWT      TS        Ther Ex             Pt education             Nustep for cardio 10' L4 L4 10' 11' L4 1000 steps  12' L4 1100 steps 12' L4 1000 steps  11' L4 900 steps 12' L4 12' L4 1000 steps 12' L4 1000 steps    STS             Supine hip flexor stretch w/ strap             Supine hamstring stretch w/ strap             Seated hamstring stretch w/ strap                                       Ther Activity             Total gym squats  L22 2x15  1x10 L22 3x10  L22 3x10    L22 4x12 L22 3x10     Alt step taps             Lateral walks             Ambulation w/ SPC  4 laps gym CGA gait belt 5 laps CGA gait belt 90\"/ SPC 2 laps x 4 CGA gait belt SPC R 2 laps x 4 CGA gait belt SPC R 2.5# CW        Ambulation with RW       1', 1' rest 5x 70\", 50\" rest 5x 70\", 50\" rest 5x    Gait Training                                       Modalities                                                            Merrick Waldron, PT  4/16/2025,7:54 PM  "

## 2025-04-18 ENCOUNTER — OFFICE VISIT (OUTPATIENT)
Dept: PHYSICAL THERAPY | Facility: CLINIC | Age: 60
End: 2025-04-18
Payer: MEDICARE

## 2025-04-18 DIAGNOSIS — Z96.651 STATUS POST REVISION OF TOTAL KNEE REPLACEMENT, RIGHT: Primary | ICD-10-CM

## 2025-04-18 DIAGNOSIS — R53.81 PHYSICAL DECONDITIONING: ICD-10-CM

## 2025-04-18 PROCEDURE — 97530 THERAPEUTIC ACTIVITIES: CPT

## 2025-04-18 PROCEDURE — 97110 THERAPEUTIC EXERCISES: CPT

## 2025-04-18 NOTE — PROGRESS NOTES
Daily Note    Today's date: 25  Patient name: Aaron Richards  : 1965  MRN: [id number]  Referring provider: Dionne Kuhn PA-C  Dx:   Encounter Diagnosis     ICD-10-CM    1. Status post revision of total knee replacement, right  Z96.651       2. Physical deconditioning  R53.81           Start Time: 1145  Stop Time: 1230  Total time in clinic (min): 45 minutes      Subjective: Aaron reports some fatigue following previous session.    Objective: See treatment diary below.    Assessment: Aaron tolerated treatment well with consistent cuing throughout. TE's were performed with increased reps and increased resistance. No new TE's were performed today. Following treatment, the patient demonstrated fatigue and would benefit from continued physical therapy. All standing exercises were performed with gait belt and close contact guard throughout the session.    Plan: Continue per plan of care.  Progress treatment as tolerated.         Precautions: R Transmetatarsal amputation ~5 years, s/p R TKA revision 24, DM type 2    POC expires Unit limit Auth  expiration date PT/OT + Visit Limit?   3/28/25 N/A  DOMN      Sec - 60 visits per auth           Visit/Unit Tracking  AUTH Status:  Date 4/9 4/11 4/16 4/18  3/14 3/19 3/21 3/26 3/28 4/2 4/4   N/A Used 37 38 39 40  30 31 32 33 34 35 36    Remaining                     Manuals 3/19 3/21 3/26 3/28 4/2 4/4 4/9 4/11 4/16 4/18   Reassessment      TS                    Orthotic fitting                          Neuro Re-Ed             SLR 3 way             Prone hamstring curls             Bridges w/ hip ABD             Supine PPT             Ankle 4 way seated w/ TB     instructed        LAQ w/ hold             Matrix flex/ext 4x10 45# ext, 55# flex 3x10  45# ext,  55# flex 3x15 30# 3x15 50# 3x15 30# 3x15 50# 3x10 40# 3x10 50#  3x12 40# 3x12 50# 3x10 40# 3x10 50# 3x20 30# 3x20 40# 3x10 40# 3x10 50#   Standing 3 way hip             Seated hip ABD        "      Lateral walks             Seated marches             Seated webslide: M, L rows             Seated MB Diagonal chops             FA EO firm             FT EO firm             FT EC firm             Tandem walking On foam 4 laps            Sidestepping on firm 4 laps w/ khang 2.5# CW                         Khang walking 4 laps 2.5# CW            Sidestepping on foam             Rockerboard             Biodex: A/P WS                          Tandem stance             TG squats                          HAMILTON, TUG, 5xSTS, 2MWT      TS        Ther Ex             Pt education             Nustep for cardio 10' L4 L4 10' 11' L4 1000 steps  12' L4 1100 steps 12' L4 1000 steps  11' L4 900 steps 12' L4 12' L4 1000 steps 12' L4 1000 steps 12' L4 1000 steps   STS             Supine hip flexor stretch w/ strap             Supine hamstring stretch w/ strap             Seated hamstring stretch w/ strap                                       Ther Activity             Total gym squats  L22 2x15  1x10 L22 3x10  L22 3x10    L22 4x12 L22 3x10  L22 4x15   Alt step taps             Lateral walks             Ambulation w/ SPC  4 laps gym CGA gait belt 5 laps CGA gait belt 90\"/ SPC 2 laps x 4 CGA gait belt SPC R 2 laps x 4 CGA gait belt SPC R 2.5# CW     60\", 60\" rest, 5x   Ambulation with RW       1', 1' rest 5x 70\", 50\" rest 5x 70\", 50\" rest 5x    Gait Training                                       Modalities                                            Merrick Waldron, PT  4/18/2025,1:31 PM  "

## 2025-04-23 ENCOUNTER — OFFICE VISIT (OUTPATIENT)
Dept: PHYSICAL THERAPY | Facility: CLINIC | Age: 60
End: 2025-04-23
Attending: PHYSICIAN ASSISTANT
Payer: MEDICARE

## 2025-04-23 DIAGNOSIS — R53.81 PHYSICAL DECONDITIONING: ICD-10-CM

## 2025-04-23 DIAGNOSIS — Z96.651 STATUS POST REVISION OF TOTAL KNEE REPLACEMENT, RIGHT: Primary | ICD-10-CM

## 2025-04-23 PROCEDURE — 97110 THERAPEUTIC EXERCISES: CPT

## 2025-04-23 PROCEDURE — 97530 THERAPEUTIC ACTIVITIES: CPT

## 2025-04-23 NOTE — PROGRESS NOTES
Daily Note    Today's date: 25  Patient name: Aaron Richards  : 1965  MRN: [id number]  Referring provider: Dionne Kuhn PA-C  Dx:   Encounter Diagnosis     ICD-10-CM    1. Status post revision of total knee replacement, right  Z96.651       2. Physical deconditioning  R53.81           Start Time: 1400  Stop Time: 1445  Total time in clinic (min): 45 minutes      Subjective: Aaron reports no c/o today. He would like to work on standing endurance next visit.    Objective: See treatment diary below.    Assessment: Aaron tolerated treatment well with consistent cuing throughout. TE's were performed with increased reps. No new TE's were performed today. Following treatment, the patient demonstrated fatigue and would benefit from continued physical therapy.    Plan: Continue per plan of care.  Progress treatment as tolerated.         Precautions: R Transmetatarsal amputation ~5 years, s/p R TKA revision 24, DM type 2    POC expires Unit limit Auth  expiration date PT/OT + Visit Limit?   3/28/25 N/A  DOMN      Sec - 60 visits per auth           Visit/Unit Tracking  AUTH Status:  Date 4/9 4/11 4/16 4/18 4/23  3/19 3/21 3/26 3/28 4/2 4/4   N/A Used 37 38 39 40 41  31 32 33 34 35 36    Remaining                     Manuals 4/23  3/26 3/28 4/2 4/4 4/9 4/11 4/16 4/18   Reassessment      TS                    Orthotic fitting                          Neuro Re-Ed             SLR 3 way             Prone hamstring curls             Bridges w/ hip ABD             Supine PPT             Ankle 4 way seated w/ TB     instructed        LAQ w/ hold             Matrix flex/ext 3x12 40#   3x12 50#  3x15 30# 3x15 50# 3x15 30# 3x15 50# 3x10 40# 3x10 50#  3x12 40# 3x12 50# 3x10 40# 3x10 50# 3x20 30# 3x20 40# 3x10 40# 3x10 50#   Standing 3 way hip             Seated hip ABD             Lateral walks             Seated marches             Seated webslide: M, L rows             Seated MB Diagonal chops       "       FA EO firm             FT EO firm             FT EC firm             Tandem walking             Sidestepping on firm                          Renee walking             Sidestepping on foam             Rockerboard             Biodex: A/P WS NV                         Tandem stance             TG squats                          HAMILTON, TUG, 5xSTS, 2MWT      TS        Ther Ex             Pt education             Nustep for cardio 13' L4 1000 steps  11' L4 1000 steps  12' L4 1100 steps 12' L4 1000 steps  11' L4 900 steps 12' L4 12' L4 1000 steps 12' L4 1000 steps 12' L4 1000 steps   STS             Supine hip flexor stretch w/ strap             Supine hamstring stretch w/ strap             Seated hamstring stretch w/ strap                                       Ther Activity             Total gym squats   L22 3x10  L22 3x10    L22 4x12 L22 3x10  L22 4x15   Alt step taps             Lateral walks             Ambulation w/ SPC 60\", 60\" rest, 4x  5 laps CGA gait belt 90\"/ SPC 2 laps x 4 CGA gait belt SPC R 2 laps x 4 CGA gait belt SPC R 2.5# CW     60\", 60\" rest, 5x   Ambulation with RW       1', 1' rest 5x 70\", 50\" rest 5x 70\", 50\" rest 5x    Gait Training                                       Modalities                                            Merrick Waldron, PT  4/23/2025,2:45 PM  "

## 2025-04-25 ENCOUNTER — OFFICE VISIT (OUTPATIENT)
Dept: PHYSICAL THERAPY | Facility: CLINIC | Age: 60
End: 2025-04-25
Payer: MEDICARE

## 2025-04-25 DIAGNOSIS — Z96.651 STATUS POST REVISION OF TOTAL KNEE REPLACEMENT, RIGHT: Primary | ICD-10-CM

## 2025-04-25 DIAGNOSIS — R53.81 PHYSICAL DECONDITIONING: ICD-10-CM

## 2025-04-25 PROCEDURE — 97110 THERAPEUTIC EXERCISES: CPT

## 2025-04-25 PROCEDURE — 97112 NEUROMUSCULAR REEDUCATION: CPT

## 2025-04-25 NOTE — PROGRESS NOTES
Daily Note    Today's date: 25  Patient name: Aaron Richards  : 1965  MRN: [id number]  Referring provider: Dionne Kuhn PA-C  Dx:   Encounter Diagnosis     ICD-10-CM    1. Status post revision of total knee replacement, right  Z96.651       2. Physical deconditioning  R53.81           Start Time: 1345  Stop Time: 1430  Total time in clinic (min): 45 minutes      Subjective: Aaron reports no changes today. He has some soreness    Objective: See treatment diary below.    Assessment: Aaron tolerated treatment well with consistent cuing throughout. TE's were performed with increased reps and increased resistance. No new TE's were performed today. Following treatment, the patient demonstrated fatigue and would benefit from continued physical therapy.    Plan: Continue per plan of care.  Progress treatment as tolerated.         Precautions: R Transmetatarsal amputation ~5 years, s/p R TKA revision 24, DM type 2    POC expires Unit limit Auth  expiration date PT/OT + Visit Limit?   3/28/25 N/A  DOMN      Sec - 60 visits per auth           Visit/Unit Tracking  AUTH Status:  Date 4/9 4/11 4/16 4/18 4/23 4/25  3/21 3/26 3/28 4/2 4/4   N/A Used 37 38 39 40 41 42  32 33 34 35 36    Remaining                     Manuals 4/23 4/25  3/28 4/2 4/4 4/9 4/11 4/16 4/18   Reassessment      TS                    Orthotic fitting                          Neuro Re-Ed             SLR 3 way             Prone hamstring curls             Bridges w/ hip ABD             Supine PPT             Ankle 4 way seated w/ TB     instructed        LAQ w/ hold             Matrix flex/ext 3x12 40#   3x12 50# 3x12 45#   3x12 55#  3x15 30# 3x15 50# 3x10 40# 3x10 50#  3x12 40# 3x12 50# 3x10 40# 3x10 50# 3x20 30# 3x20 40# 3x10 40# 3x10 50#   Standing 3 way hip             Seated hip ABD             Lateral walks             Seated marches             Seated webslide: M, L rows             Seated MB Diagonal chops           "   FA EO firm             FT EO firm             FT EC firm             Tandem walking             Sidestepping on firm                          Renee walking             Sidestepping on foam             Rockerboard             Biodex: A/P WS NV 1.5' 5x 1' rest                        Tandem stance             TG squats                          HAMILTON, TUG, 5xSTS, 2MWT      TS        Ther Ex             Pt education             Nustep for cardio 13' L4 1000 steps 10' L4 1000 steps  12' L4 1100 steps 12' L4 1000 steps  11' L4 900 steps 12' L4 12' L4 1000 steps 12' L4 1000 steps 12' L4 1000 steps   STS             Supine hip flexor stretch w/ strap             Supine hamstring stretch w/ strap             Seated hamstring stretch w/ strap                                       Ther Activity             Total gym squats    L22 3x10    L22 4x12 L22 3x10  L22 4x15   Alt step taps             Lateral walks             Ambulation w/ SPC 60\", 60\" rest, 4x   2 laps x 4 CGA gait belt SPC R 2 laps x 4 CGA gait belt SPC R 2.5# CW     60\", 60\" rest, 5x   Ambulation with RW       1', 1' rest 5x 70\", 50\" rest 5x 70\", 50\" rest 5x    Gait Training                                       Modalities                                            Merrick Waldron, PT  4/25/2025,2:26 PM  "

## 2025-04-30 ENCOUNTER — TELEPHONE (OUTPATIENT)
Age: 60
End: 2025-04-30

## 2025-04-30 ENCOUNTER — OFFICE VISIT (OUTPATIENT)
Dept: PHYSICAL THERAPY | Facility: CLINIC | Age: 60
End: 2025-04-30
Attending: PHYSICIAN ASSISTANT
Payer: MEDICARE

## 2025-04-30 DIAGNOSIS — R53.81 PHYSICAL DECONDITIONING: ICD-10-CM

## 2025-04-30 DIAGNOSIS — Z96.651 STATUS POST REVISION OF TOTAL KNEE REPLACEMENT, RIGHT: Primary | ICD-10-CM

## 2025-04-30 PROCEDURE — 97110 THERAPEUTIC EXERCISES: CPT

## 2025-04-30 PROCEDURE — 97112 NEUROMUSCULAR REEDUCATION: CPT

## 2025-04-30 NOTE — TELEPHONE ENCOUNTER
Caller: Aaron (Patient)    Doctor: Dr. Hendrix    Reason for call: Patient needs an updated script for PT. [facility] does [facility], no need to fax.     Dx on last PT Script was: Status post revision of total knee replacement, right [Z96.651 (ICD-10-CM)]     Call back#: [phone]

## 2025-04-30 NOTE — PROGRESS NOTES
Daily Note    Today's date: 25  Patient name: Aaron Richards  : 1965  MRN: [id number]  Referring provider: Dionne Kuhn PA-C  Dx:   Encounter Diagnosis     ICD-10-CM    1. Status post revision of total knee replacement, right  Z96.651       2. Physical deconditioning  R53.81           Start Time: 1430  Stop Time: 1515  Total time in clinic (min): 45 minutes      Subjective: Aaron reports no c/o today. He has been using his SPC to ambulate instead of the walker for the last 3 days.    Objective: See treatment diary below.    Assessment: Aaron tolerated treatment well with consistent cuing throughout. TE's were performed with increased reps and increased resistance. No new TE's were performed today. Following treatment, the patient demonstrated fatigue and would benefit from continued physical therapy.    Plan: Continue per plan of care.  Progress treatment as tolerated.         Precautions: R Transmetatarsal amputation ~5 years, s/p R TKA revision 24, DM type 2    POC expires Unit limit Auth  expiration date PT/OT + Visit Limit?   3/28/25 N/A  DOMN      Sec - 60 visits per auth           Visit/Unit Tracking  AUTH Status:  Date 4/9 4/11 4/16 4/18 4/23 4/25 4/30  3/26 3/28 4/2 4/4   N/A Used 37 38 39 40 41 42 43  33 34 35 36    Remaining                     Manuals    Reassessment      TS                    Orthotic fitting                          Neuro Re-Ed             SLR 3 way             Prone hamstring curls             Bridges w/ hip ABD             Supine PPT             Ankle 4 way seated w/ TB     instructed        LAQ w/ hold             Matrix flex/ext 3x12 40#   3x12 50# 3x12 45#   3x12 55# 3x12 45#   3x12 55#  3x10 40# 3x10 50#  3x12 40# 3x12 50# 3x10 40# 3x10 50# 3x20 30# 3x20 40# 3x10 40# 3x10 50#   Standing 3 way hip             Seated hip ABD             Lateral walks             Seated marches             Seated  "webslide: M, L rows             Seated MB Diagonal chops             FA EO firm             FT EO firm             FT EC firm             Tandem walking             Sidestepping on firm                          Renee walking             Sidestepping on foam             Rockerboard             Biodex: A/P WS NV 1.25' 4x 1' rest 1.25' 5x 1' rest                       Tandem stance             TG squats                          HAMILTON, TUG, 5xSTS, 2MWT      TS        Ther Ex             Pt education             Nustep for cardio 13' L4 1000 steps 10' L4 1000 steps 13' L4 1100 steps  12' L4 1000 steps  11' L4 900 steps 12' L4 12' L4 1000 steps 12' L4 1000 steps 12' L4 1000 steps   STS             Supine hip flexor stretch w/ strap             Supine hamstring stretch w/ strap             Seated hamstring stretch w/ strap                                       Ther Activity             Total gym squats        L22 4x12 L22 3x10  L22 4x15   Alt step taps             Lateral walks             Ambulation w/ SPC 60\", 60\" rest, 4x    2 laps x 4 CGA gait belt SPC R 2.5# CW     60\", 60\" rest, 5x   Ambulation with RW       1', 1' rest 5x 70\", 50\" rest 5x 70\", 50\" rest 5x    Gait Training                                       Modalities                                            Merrick Waldron, PT  4/30/2025,2:46 PM  "

## 2025-05-02 ENCOUNTER — OFFICE VISIT (OUTPATIENT)
Dept: PHYSICAL THERAPY | Facility: CLINIC | Age: 60
End: 2025-05-02
Attending: PHYSICIAN ASSISTANT
Payer: MEDICARE

## 2025-05-02 DIAGNOSIS — R53.81 PHYSICAL DECONDITIONING: ICD-10-CM

## 2025-05-02 DIAGNOSIS — Z96.651 STATUS POST REVISION OF TOTAL KNEE REPLACEMENT, RIGHT: Primary | ICD-10-CM

## 2025-05-02 PROCEDURE — 97112 NEUROMUSCULAR REEDUCATION: CPT

## 2025-05-02 PROCEDURE — 97110 THERAPEUTIC EXERCISES: CPT

## 2025-05-02 NOTE — PROGRESS NOTES
Daily Note     Today's date: 2025  Patient name: Aaron Richards  : 1965  MRN: [id number]  Referring provider: Dionne Kuhn PA-C  Dx:   Encounter Diagnosis     ICD-10-CM    1. Status post revision of total knee replacement, right  Z96.651       2. Physical deconditioning  R53.81                      Subjective: Patient reports that yesterday was the first time in the last 3 years that he mowed his grass. He says that he navigated the yard with use of SPC to get on/off his tractor.       Objective: See treatment diary below      Assessment: Tolerated treatment well. Patient able to ambulate throughout gym today with SPC and good stability demonstrated. Increased time on weightbearing/weightshifting exercise with increased fatigue noted due to no UE support utilized. Patient demonstrated fatigue post treatment and exhibited good technique with therapeutic exercises.      Plan: DC to fitness gym program at this time.      Precautions: R Transmetatarsal amputation ~5 years, s/p R TKA revision 24, DM type 2    POC expires Unit limit Auth  expiration date PT/OT + Visit Limit?   3/28/25 N/A  DOMN      Sec - 60 visits per auth           Visit/Unit Tracking  AUTH Status:  Date 4/9 4/11 4/16 4/18 4/23 4/25 4/30 5/2  3/28 4/2 4/4   N/A Used 37 38 39 40 41 42 43 44  34 35 36    Remaining                     Manuals    Reassessment      TS                    Orthotic fitting                          Neuro Re-Ed             SLR 3 way             Prone hamstring curls             Bridges w/ hip ABD             Supine PPT             Ankle 4 way seated w/ TB     instructed        LAQ w/ hold             Matrix flex/ext 3x12 40#   3x12 50# 3x12 45#   3x12 55# 3x12 45#   3x12 55# 3x12 45#   3x12 65# 3x10 40# 3x10 50#  3x12 40# 3x12 50# 3x10 40# 3x10 50# 3x20 30# 3x20 40# 3x10 40# 3x10 50#   Standing 3 way hip             Seated hip ABD             Lateral walks       "       Seated marches             Seated webslide: M, L rows             Seated MB Diagonal chops             FA EO firm             FT EO firm             FT EC firm             Tandem walking             Sidestepping on firm                          Renee walking             Sidestepping on foam             Rockerboard             Biodex: A/P WS NV 1.25' 4x 1' rest 1.25' 5x 1' rest 1.45'  4x 1' rest                      Tandem stance             TG squats                          HAMILTON, TUG, 5xSTS, 2MWT      TS        Ther Ex             Pt education             Nustep for cardio 13' L4 1000 steps 10' L4 1000 steps 13' L4 1100 steps L5   14'  1000 steps 12' L4 1000 steps  11' L4 900 steps 12' L4 12' L4 1000 steps 12' L4 1000 steps 12' L4 1000 steps   STS             Supine hip flexor stretch w/ strap             Supine hamstring stretch w/ strap             Seated hamstring stretch w/ strap                                       Ther Activity             Total gym squats        L22 4x12 L22 3x10  L22 4x15   Alt step taps             Lateral walks             Ambulation w/ SPC 60\", 60\" rest, 4x    2 laps x 4 CGA gait belt SPC R 2.5# CW     60\", 60\" rest, 5x   Ambulation with RW       1', 1' rest 5x 70\", 50\" rest 5x 70\", 50\" rest 5x    Gait Training                                       Modalities                                              "

## 2025-05-09 NOTE — ASSESSMENT & PLAN NOTE
"/65   Pulse 85   Temp 99 1 °F (37 3 °C) (Oral)   Resp 18   Ht 6' 4\" (1 93 m)   Wt 122 kg (268 lb 15 4 oz)   SpO2 91%   BMI 32 74 kg/m²   · Stable pressures   Continue home HCTZ/irbesartan, verapamil  · Monitor BP  " Female

## 2025-06-23 ENCOUNTER — RA CDI HCC (OUTPATIENT)
Dept: OTHER | Facility: HOSPITAL | Age: 60
End: 2025-06-23

## 2025-06-30 ENCOUNTER — OFFICE VISIT (OUTPATIENT)
Dept: FAMILY MEDICINE CLINIC | Facility: CLINIC | Age: 60
End: 2025-06-30
Payer: MEDICARE

## 2025-06-30 VITALS
BODY MASS INDEX: 29.96 KG/M2 | OXYGEN SATURATION: 97 % | HEART RATE: 84 BPM | WEIGHT: 246 LBS | DIASTOLIC BLOOD PRESSURE: 82 MMHG | SYSTOLIC BLOOD PRESSURE: 118 MMHG | TEMPERATURE: 98.8 F | HEIGHT: 76 IN

## 2025-06-30 DIAGNOSIS — Z79.4 TYPE 2 DIABETES MELLITUS WITH DIABETIC PERIPHERAL ANGIOPATHY WITHOUT GANGRENE, WITH LONG-TERM CURRENT USE OF INSULIN (HCC): Primary | ICD-10-CM

## 2025-06-30 DIAGNOSIS — E11.51 TYPE 2 DIABETES MELLITUS WITH DIABETIC PERIPHERAL ANGIOPATHY WITHOUT GANGRENE, WITH LONG-TERM CURRENT USE OF INSULIN (HCC): Primary | ICD-10-CM

## 2025-06-30 PROCEDURE — G2211 COMPLEX E/M VISIT ADD ON: HCPCS | Performed by: FAMILY MEDICINE

## 2025-06-30 PROCEDURE — 99214 OFFICE O/P EST MOD 30 MIN: CPT | Performed by: FAMILY MEDICINE

## 2025-06-30 NOTE — PROGRESS NOTES
"Name: Aaron Richards      : 1965      MRN: [id number]  Encounter Provider: Sujata Garvey DO  Encounter Date: 2025   Encounter department: [facility] [address]  :  Assessment & Plan  Type 2 diabetes mellitus with diabetic peripheral angiopathy without gangrene, with long-term current use of insulin ([facility])    Lab Results   Component Value Date    HGBA1C 7.2 (H) 2025       Orders:  •  Hemoglobin A1C; Future  •  Albumin / creatinine urine ratio; Future  •  Ambulatory referral to Ophthalmology; Future           History of Present Illness   HPI    Patient presents to the office for follow up. States that he is doing very well.     Active and he is using sliding scale, 4 units starting with 2 unit increase., he is walking around a lot more, using a cane. No longer in wheelchair. Going to the gym a few times per week.     Was in wheelchair for 2.5 years.     Review of Systems    Objective   /82   Pulse 84   Temp 98.8 °F (37.1 °C)   Ht 6' 4\" (1.93 m)   Wt 112 kg (246 lb)   SpO2 97%   BMI 29.94 kg/m²      Physical Exam  Vitals reviewed.   Constitutional:       General: He is not in acute distress.     Appearance: Normal appearance.   HENT:      Head: Normocephalic and atraumatic.      Right Ear: External ear normal.      Left Ear: External ear normal.      Nose: Nose normal.      Mouth/Throat:      Mouth: Mucous membranes are moist.     Eyes:      Extraocular Movements: Extraocular movements intact.      Conjunctiva/sclera: Conjunctivae normal.       Cardiovascular:      Rate and Rhythm: Normal rate and regular rhythm.      Heart sounds: Normal heart sounds.   Pulmonary:      Effort: Pulmonary effort is normal.      Breath sounds: Normal breath sounds. No wheezing, rhonchi or rales.   Abdominal:      General: Bowel sounds are normal. There is no distension.      Palpations: Abdomen is soft.      Tenderness: There is no abdominal tenderness. "     Musculoskeletal:      Cervical back: Neck supple.      Right lower leg: No edema.      Left lower leg: No edema.      Comments: Atrophied LE calf muscles, right knee brace in place     Skin:     General: Skin is warm.      Capillary Refill: Capillary refill takes less than 2 seconds.      Findings: No rash.     Neurological:      Mental Status: He is alert. Mental status is at baseline.             DO Ej Ceballos [facility]  6/30/2025 3:13 PM

## 2025-06-30 NOTE — ASSESSMENT & PLAN NOTE
Lab Results   Component Value Date    HGBA1C 7.2 (H) 03/19/2025       Orders:  •  Hemoglobin A1C; Future  •  Albumin / creatinine urine ratio; Future  •  Ambulatory referral to Ophthalmology; Future

## 2025-07-01 NOTE — NURSING NOTE
Patient is alert and oriented x 4, refused routine vitals this shift  Pt adequately verbalized understanding of plan of care and pt education  Comfortably resting in bed without acute distress 
Patient upset about receiving antibiotic at night and the pump peeping  Re-educated on the importance of receiving his medication at the scheduled dose  He verbally acknowledged his understanding 
Pt  Refused 1500 vitals and 1600 blood sugar  Pt  Stated he will notify PCA when blood sugar should be drawn  RN made aware 
Pt noted to have a high Bladder Scan Volume  250mls of urine noted to bag  Upon assessment of casey,  Catheter noted to be kinked  approx 400ml of urine emptied into bag 
Pt refused 2300 Vitals and wanted to be disconnected from IV pump; compromised and was agreeable to infuse IVF by gravity 
Pt refused afternoon insulin coverage stating "my girlfriend will bring me lunch and ill let you know when I want to be covered"  Pts blood sugar rechecked at 1315 when pts girlfriend arrived  Blood sugar 137  Pt stated he will accept the 15 units standing order of insulin  This RN took notice that pts girlfriend brought fast food in from [facility]  Pt educated on diabetic diet with a sodium restriction of 2gm and still continued to eat fast food  15 units of insulin administered 
Pt refusing blood sugar checks and vital signs   Will attempt later
Pt refusing to eat at this time  Will hold Insulin  Pt agreeable to call and take after he has eaten  
Received call from pharmacy to hang vanco 
normal/well-groomed/no distress

## 2025-07-03 DIAGNOSIS — I10 HYPERTENSION, ESSENTIAL: ICD-10-CM

## 2025-07-03 RX ORDER — VERAPAMIL HYDROCHLORIDE 360 MG/1
360 CAPSULE, DELAYED RELEASE ORAL
Qty: 90 CAPSULE | Refills: 0 | Status: SHIPPED | OUTPATIENT
Start: 2025-07-03

## 2025-07-07 ENCOUNTER — TELEPHONE (OUTPATIENT)
Dept: FAMILY MEDICINE CLINIC | Facility: CLINIC | Age: 60
End: 2025-07-07

## 2025-07-07 DIAGNOSIS — E11.9 TYPE 2 DIABETES MELLITUS WITHOUT COMPLICATION, WITH LONG-TERM CURRENT USE OF INSULIN (HCC): Primary | ICD-10-CM

## 2025-07-07 DIAGNOSIS — Z79.4 TYPE 2 DIABETES MELLITUS WITHOUT COMPLICATION, WITH LONG-TERM CURRENT USE OF INSULIN (HCC): Primary | ICD-10-CM

## 2025-07-07 NOTE — TELEPHONE ENCOUNTER
Reason for call:   [x] Refill   [] Prior Auth  [x] Other: Previously prescribed by different provider. Not on current medication list. Patient stated his last injection was today 7/7/25. He has enough to last him for 7 days.     Office:   [x] PCP/Provider - [facility] [address],    [] Specialty/Provider -     Medication: insulin glulisine (Apidra) 100 units/mL injection  Inject under the skin 3 (three) times a day with meals     Quantity: 60 mL    Pharmacy: [facility] [address] [facility]   Does the patient have enough for 3 days?   [] Yes   [] No - Send as HP to POD    Mail Away Pharmacy   Does the patient have enough for 10 days?   [] Yes   [x] No - Send as HP to POD

## 2025-07-16 DIAGNOSIS — K21.9 GASTROESOPHAGEAL REFLUX DISEASE WITHOUT ESOPHAGITIS: ICD-10-CM

## 2025-07-16 NOTE — TELEPHONE ENCOUNTER
Patient called Rx refill line checking on the status of this refill - advised pt it is still pending     Apidra as per sliding scale     Patient called to request a refill for their Apidra   advised a refill was requested on 7/7/25  and is pending approval. Patient verbalized understanding and is in agreement.     Does the patient have enough for 3 days?   [] Yes   [x] No - Send as HP to POD       Please put priority on Rx so its shipped asap and call pt when sent

## 2025-07-17 RX ORDER — OMEPRAZOLE 40 MG/1
40 CAPSULE, DELAYED RELEASE ORAL EVERY OTHER DAY
Qty: 15 CAPSULE | Refills: 5 | Status: SHIPPED | OUTPATIENT
Start: 2025-07-17 | End: 2025-07-18 | Stop reason: SDUPTHER

## 2025-07-18 DIAGNOSIS — Z79.4 TYPE 2 DIABETES MELLITUS WITH HYPERGLYCEMIA, WITH LONG-TERM CURRENT USE OF INSULIN (HCC): ICD-10-CM

## 2025-07-18 DIAGNOSIS — E11.65 TYPE 2 DIABETES MELLITUS WITH HYPERGLYCEMIA, WITH LONG-TERM CURRENT USE OF INSULIN (HCC): ICD-10-CM

## 2025-07-18 RX ORDER — INSULIN GLULISINE 100 [IU]/ML
INJECTION, SOLUTION SUBCUTANEOUS
Qty: 15 ML | Refills: 0 | Status: SHIPPED | OUTPATIENT
Start: 2025-07-18

## 2025-07-18 RX ORDER — OMEPRAZOLE 40 MG/1
40 CAPSULE, DELAYED RELEASE ORAL EVERY OTHER DAY
Qty: 45 CAPSULE | Refills: 1 | Status: SHIPPED | OUTPATIENT
Start: 2025-07-18

## 2025-07-18 NOTE — TELEPHONE ENCOUNTER
Patient called, [facility]ep[facility]ole [facility] day [facility] and [facility] needs a [facility] script for 90 day supply. Advised patient I would update the script and resend.

## 2025-07-18 NOTE — TELEPHONE ENCOUNTER
Mariel, would you mind to review this  in 's absence, please?     Pt called very upset, he has been waiting for his insulin  since 7/7/2025

## 2025-07-18 NOTE — TELEPHONE ENCOUNTER
Phone call from patient to check on the status of his earlier message he and [facility]  left. Pt asking Dr Garvey to expedite his request for his script for his  fast acting insulin / Apidra      Meds pended for refill

## 2025-07-20 RX ORDER — PEN NEEDLE, DIABETIC 32GX 5/32"
NEEDLE, DISPOSABLE MISCELLANEOUS 4 TIMES DAILY
Qty: 400 EACH | Refills: 1 | Status: SHIPPED | OUTPATIENT
Start: 2025-07-20

## (undated) DEVICE — LIGACLIP MCA MULTIPLE CLIP APPLIERS, 20 MEDIUM CLIPS: Brand: LIGACLIP

## (undated) DEVICE — LIGHT HANDLE COVER SLEEVE DISP BLUE STELLAR

## (undated) DEVICE — BETHLEHEM UNIVERSAL  MIONR EXT: Brand: CARDINAL HEALTH

## (undated) DEVICE — ACE WRAP 6 IN UNSTERILE

## (undated) DEVICE — INTENDED FOR TISSUE SEPARATION, AND OTHER PROCEDURES THAT REQUIRE A SHARP SURGICAL BLADE TO PUNCTURE OR CUT.: Brand: BARD-PARKER ® CARBON RIB-BACK BLADES

## (undated) DEVICE — PLUMEPEN PRO 10FT

## (undated) DEVICE — GLOVE INDICATOR PI UNDERGLOVE SZ 7.5 BLUE

## (undated) DEVICE — 4-PORT MANIFOLD: Brand: NEPTUNE 2

## (undated) DEVICE — SUT VICRYL PLUS 0 CTB-1 27 IN VCPB260H

## (undated) DEVICE — GAUZE SPONGES,16 PLY: Brand: CURITY

## (undated) DEVICE — SPECIMEN CONTAINER STERILE PEEL PACK

## (undated) DEVICE — DRAPE C-ARMOUR

## (undated) DEVICE — BULB SYRINGE,IRRIGATION WITH PROTECTIVE CAP: Brand: DOVER

## (undated) DEVICE — PAD CAST 4 IN COTTON NON STERILE

## (undated) DEVICE — CURITY NON-ADHERENT STRIPS: Brand: CURITY

## (undated) DEVICE — KERLIX BANDAGE ROLL: Brand: KERLIX

## (undated) DEVICE — DRESSING MEPILEX AG BORDER POST-OP 4 X 12 IN

## (undated) DEVICE — SUT ETHILON 2-0 FSLX 30 IN 1674H

## (undated) DEVICE — SUT VICRYL 1 CT-1 27 IN J261H

## (undated) DEVICE — DELIVERY NEEDLE 12GA X 10CM CURVED-STERILE

## (undated) DEVICE — SCD SEQUENTIAL COMPRESSION COMFORT SLEEVE MEDIUM KNEE LENGTH: Brand: KENDALL SCD

## (undated) DEVICE — GLOVE INDICATOR PI UNDERGLOVE SZ 6.5 BLUE

## (undated) DEVICE — 3M™ STERI-STRIP™ REINFORCED ADHESIVE SKIN CLOSURES, R1547, 1/2 IN X 4 IN (12 MM X 100 MM), 6 STRIPS/ENVELOPE: Brand: 3M™ STERI-STRIP™

## (undated) DEVICE — DRESSING MEPILEX AG BORDER 4 X 8 IN

## (undated) DEVICE — 3.5MM CORTEX SCREW SELF-TAPPING 36MM: Type: IMPLANTABLE DEVICE | Status: NON-FUNCTIONAL

## (undated) DEVICE — DUAL CUT SAGITTAL BLADE

## (undated) DEVICE — OCCLUSIVE GAUZE STRIP,3% BISMUTH TRIBROMOPHENATE IN PETROLATUM BLEND: Brand: XEROFORM

## (undated) DEVICE — IMPERVIOUS STOCKINETTE: Brand: DEROYAL

## (undated) DEVICE — SURGICAL GOWN, XL SMARTSLEEVE: Brand: CONVERTORS

## (undated) DEVICE — MEDI-VAC YANKAUER SUCTION HANDLE W/BULBOUS AND CONTROL VENT: Brand: CARDINAL HEALTH

## (undated) DEVICE — CHLORAPREP HI-LITE 26ML ORANGE

## (undated) DEVICE — 2.0MM KIRSCHNER WIRE W/TROCAR POINT 150MM
Type: IMPLANTABLE DEVICE | Site: KNEE | Status: NON-FUNCTIONAL
Removed: 2023-05-01

## (undated) DEVICE — 2.5MM DRILL BIT/QC/GOLD/110MM

## (undated) DEVICE — 3M™ TEGADERM™ TRANSPARENT FILM DRESSING FRAME STYLE, 1626W, 4 IN X 4-3/4 IN (10 CM X 12 CM), 50/CT 4CT/CASE: Brand: 3M™ TEGADERM™

## (undated) DEVICE — 1.6MM KIRSCHNER WIRE WITH 5MM THREAD-TROCAR POINT 150MM
Type: IMPLANTABLE DEVICE | Site: KNEE | Status: NON-FUNCTIONAL
Removed: 2023-05-01

## (undated) DEVICE — PADDING CAST 4 IN  COTTON STRL

## (undated) DEVICE — ABDOMINAL PAD: Brand: DERMACEA

## (undated) DEVICE — SAW BLADE OSCILLATING BRAZOL 167

## (undated) DEVICE — STOCKINETTE REGULAR

## (undated) DEVICE — CHLORAPREP HI-LITE 10.5ML ORANGE

## (undated) DEVICE — INTENDED FOR TISSUE SEPARATION, AND OTHER PROCEDURES THAT REQUIRE A SHARP SURGICAL BLADE TO PUNCTURE OR CUT.: Brand: BARD-PARKER SAFETY BLADES SIZE 15, STERILE

## (undated) DEVICE — WEBRIL 6 IN UNSTERILE

## (undated) DEVICE — DRAPE SHEET THREE QUARTER

## (undated) DEVICE — SUT ETHILON 3-0 FSLX 30 IN 1673H

## (undated) DEVICE — SUT STRATAFIX SPIRAL PLUS 3-0 PS-2 30 X 30 CM SXMP2B408

## (undated) DEVICE — DRAPE SURGIKIT SADDLE BAG

## (undated) DEVICE — 3M™ IOBAN™ 2 ANTIMICROBIAL INCISE DRAPE 6650EZ: Brand: IOBAN™ 2

## (undated) DEVICE — ACE WRAP 4 IN STERILE

## (undated) DEVICE — HOOD: Brand: FLYTE, SURGICOOL

## (undated) DEVICE — GLOVE INDICATOR PI UNDERGLOVE SZ 8.5 BLUE

## (undated) DEVICE — SUT VICRYL PLUS 2-0 CTB-1 27 IN VCPB259H

## (undated) DEVICE — NEEDLE 18 G X 1 1/2

## (undated) DEVICE — 450 ML BOTTLE OF 0.05% CHLORHEXIDINE GLUCONATE IN 99.95% STERILE WATER FOR IRRIGATION, USP AND APPLICATOR.: Brand: IRRISEPT ANTIMICROBIAL WOUND LAVAGE

## (undated) DEVICE — SUT SILK 3-0 18 IN A184H

## (undated) DEVICE — 2.5MM DRILL BIT/QC/GOLD/180MM

## (undated) DEVICE — TUBING SUCTION 5MM X 12 FT

## (undated) DEVICE — GLOVE SRG BIOGEL ORTHOPEDIC 8.5

## (undated) DEVICE — PROXIMATE PLUS MD MULTI-DIRECTIONAL RELEASE SKIN STAPLERS CONTAINS 35 STAINLESS STEEL STAPLES APPROXIMATE CLOSED DIMENSIONS: 6.9MM X 3.9MM WIDE: Brand: PROXIMATE

## (undated) DEVICE — PREP PAD BNS: Brand: CONVERTORS

## (undated) DEVICE — BETHLEHEM UNIV MAJ EXT ,KIT: Brand: CARDINAL HEALTH

## (undated) DEVICE — DRESSING MEPILEX AG BORDER POST-OP 4 X 8 IN

## (undated) DEVICE — SUT VICRYL 2-0 CT-1 36 IN J945H

## (undated) DEVICE — HANDPIECE SET WITH RETRACTABLE COAXIAL FAN SPRAY TIP AND SUCTION TUBE: Brand: INTERPULSE

## (undated) DEVICE — PEEL-AWAY HOOD: Brand: FLYTE, SURGICOOL

## (undated) DEVICE — SYRINGE 30ML LL

## (undated) DEVICE — 2.8MM PERCUTANEOUS DRILL BIT F/LCP® PL QC/200MM/100MM CALIB: Brand: LCP

## (undated) DEVICE — BETHLEHEM UNIV TOTAL KNEE, KIT: Brand: CARDINAL HEALTH

## (undated) DEVICE — TELFA NON-ADHERENT ABSORBENT DRESSING: Brand: TELFA

## (undated) DEVICE — TIBURON SPLIT SHEET: Brand: CONVERTORS

## (undated) DEVICE — DRESSING MEPILEX AG BORDER POST-OP 4 X 10 IN

## (undated) DEVICE — PACK UNIVERSAL NECK

## (undated) DEVICE — ELECTRODE BLADE MOD E-Z CLEAN 2.5IN 6.4CM -0012M

## (undated) DEVICE — PAD GROUNDING ADULT

## (undated) DEVICE — SUT ETHILON 3-0 PS-1 18 IN 1663G

## (undated) DEVICE — SUT VICRYL 3-0 PS-2 27 IN J427H

## (undated) DEVICE — SUT STRATAFIX SPIRAL PDS PLUS 1 CTX 18 IN SXPP1A400

## (undated) DEVICE — I DISPOSABLE MIXING BOWL AND SPATULA: Brand: HOWMEDICA, MIX-KIT

## (undated) DEVICE — 4.0 MM X 2.35 MM X 70.0 MM OVAL CARBIDE BUR, 8 FLUTE

## (undated) DEVICE — EXOFIN PRECISION PEN HIGH VISCOSITY TOPICAL SKIN ADHESIVE: Brand: EXOFIN PRECISION PEN, 1G

## (undated) DEVICE — CUFF TOURNIQUET 30 X 4 IN QUICK CONNECT DISP 1BLA

## (undated) DEVICE — 3M™ STERI-DRAPE™ U-DRAPE 1015: Brand: STERI-DRAPE™

## (undated) DEVICE — SUT VICRYL 3-0 SH 27 IN J416H

## (undated) DEVICE — GLOVE SRG BIOGEL ORTHOPEDIC 8

## (undated) DEVICE — ATTUNE PINNING SYSTEM: Brand: ATTUNE

## (undated) DEVICE — WET SKIN PREP TRAY: Brand: MEDLINE INDUSTRIES, INC.

## (undated) DEVICE — INTENDED FOR TISSUE SEPARATION, AND OTHER PROCEDURES THAT REQUIRE A SHARP SURGICAL BLADE TO PUNCTURE OR CUT.: Brand: BARD-PARKER SAFETY BLADES SIZE 10, STERILE

## (undated) DEVICE — SUT SILK 2-0 SH 30 IN K833H

## (undated) DEVICE — BANDAGE, ESMARK LF STR 6"X9' (20/CS): Brand: CYPRESS

## (undated) DEVICE — BIPOLAR CORD DISP

## (undated) DEVICE — DRAPE C-ARM X-RAY

## (undated) DEVICE — SPONGE SCRUB 4 PCT CHLORHEXIDINE

## (undated) DEVICE — SPONGE LAP 18 X 18 IN STRL RFD

## (undated) DEVICE — NERVE STIMULATOR HAND HELD

## (undated) DEVICE — GLOVE SRG BIOGEL 8.5

## (undated) DEVICE — GLOVE INDICATOR PI UNDERGLOVE SZ 8 BLUE

## (undated) DEVICE — COBAN 6 IN STERILE

## (undated) DEVICE — GLOVE SRG BIOGEL 6.5

## (undated) DEVICE — DRESSING MEPILEX BORDER 4 X 8 IN

## (undated) DEVICE — ACE WRAP 4 IN UNSTERILE

## (undated) DEVICE — SURGICEL FIBRILLAR 1 X 2

## (undated) DEVICE — DRESSING MEPILEX BORDER 4 X 10 IN

## (undated) DEVICE — GLOVE SRG BIOGEL 7.5

## (undated) DEVICE — CEMENT MIXING SYSTEM WITH FEMORAL BREAKWAY NOZZLE: Brand: REVOLUTION

## (undated) DEVICE — MEDI-VAC YANK SUCT HNDL W/TPRD BULBOUS TIP: Brand: CARDINAL HEALTH

## (undated) DEVICE — SUT MONOCRYL 5-0 PC-2 18 IN Y495G

## (undated) DEVICE — NEEDLE 25G X 1 1/2